# Patient Record
Sex: FEMALE | Race: WHITE | NOT HISPANIC OR LATINO | Employment: OTHER | URBAN - METROPOLITAN AREA
[De-identification: names, ages, dates, MRNs, and addresses within clinical notes are randomized per-mention and may not be internally consistent; named-entity substitution may affect disease eponyms.]

---

## 2017-01-03 ENCOUNTER — ALLSCRIPTS OFFICE VISIT (OUTPATIENT)
Dept: OTHER | Facility: OTHER | Age: 77
End: 2017-01-03

## 2017-01-09 ENCOUNTER — ALLSCRIPTS OFFICE VISIT (OUTPATIENT)
Dept: OTHER | Facility: OTHER | Age: 77
End: 2017-01-09

## 2017-01-09 DIAGNOSIS — M25.552 PAIN IN LEFT HIP: ICD-10-CM

## 2017-01-09 DIAGNOSIS — Z12.31 ENCOUNTER FOR SCREENING MAMMOGRAM FOR MALIGNANT NEOPLASM OF BREAST: ICD-10-CM

## 2017-01-09 DIAGNOSIS — Z00.00 ENCOUNTER FOR GENERAL ADULT MEDICAL EXAMINATION WITHOUT ABNORMAL FINDINGS: ICD-10-CM

## 2017-01-11 ENCOUNTER — GENERIC CONVERSION - ENCOUNTER (OUTPATIENT)
Dept: OTHER | Facility: OTHER | Age: 77
End: 2017-01-11

## 2017-01-26 ENCOUNTER — LAB REQUISITION (OUTPATIENT)
Dept: LAB | Facility: HOSPITAL | Age: 77
End: 2017-01-26
Payer: MEDICARE

## 2017-01-26 DIAGNOSIS — I48.92 ATRIAL FLUTTER (HCC): ICD-10-CM

## 2017-01-26 LAB
INR PPP: 2.19 (ref 0.86–1.16)
PROTHROMBIN TIME: 24.1 SECONDS (ref 12–14.3)

## 2017-01-26 PROCEDURE — 85610 PROTHROMBIN TIME: CPT | Performed by: INTERNAL MEDICINE

## 2017-02-22 ENCOUNTER — GENERIC CONVERSION - ENCOUNTER (OUTPATIENT)
Dept: OTHER | Facility: OTHER | Age: 77
End: 2017-02-22

## 2017-02-27 ENCOUNTER — LAB REQUISITION (OUTPATIENT)
Dept: LAB | Facility: HOSPITAL | Age: 77
End: 2017-02-27
Payer: MEDICARE

## 2017-02-27 DIAGNOSIS — E11.65 TYPE 2 DIABETES MELLITUS WITH HYPERGLYCEMIA (HCC): ICD-10-CM

## 2017-02-27 DIAGNOSIS — E03.9 HYPOTHYROIDISM: ICD-10-CM

## 2017-02-27 DIAGNOSIS — I48.92 ATRIAL FLUTTER (HCC): ICD-10-CM

## 2017-02-27 LAB
ANION GAP SERPL CALCULATED.3IONS-SCNC: 9 MMOL/L (ref 4–13)
BUN SERPL-MCNC: 16 MG/DL (ref 5–25)
CALCIUM SERPL-MCNC: 8.4 MG/DL (ref 8.3–10.1)
CHLORIDE SERPL-SCNC: 104 MMOL/L (ref 100–108)
CO2 SERPL-SCNC: 27 MMOL/L (ref 21–32)
CREAT SERPL-MCNC: 0.83 MG/DL (ref 0.6–1.3)
CREAT UR-MCNC: 27.7 MG/DL
EST. AVERAGE GLUCOSE BLD GHB EST-MCNC: 169 MG/DL
GFR SERPL CREATININE-BSD FRML MDRD: >60 ML/MIN/1.73SQ M
GLUCOSE SERPL-MCNC: 224 MG/DL (ref 65–140)
HBA1C MFR BLD: 7.5 % (ref 4.2–6.3)
INR PPP: 2.22 (ref 0.86–1.16)
MICROALBUMIN UR-MCNC: 11.6 MG/L (ref 0–20)
MICROALBUMIN/CREAT 24H UR: 42 MG/G CREATININE (ref 0–30)
POTASSIUM SERPL-SCNC: 3.8 MMOL/L (ref 3.5–5.3)
PROTHROMBIN TIME: 24.3 SECONDS (ref 12–14.3)
SODIUM SERPL-SCNC: 140 MMOL/L (ref 136–145)

## 2017-02-27 PROCEDURE — 83036 HEMOGLOBIN GLYCOSYLATED A1C: CPT | Performed by: INTERNAL MEDICINE

## 2017-02-27 PROCEDURE — 85610 PROTHROMBIN TIME: CPT | Performed by: INTERNAL MEDICINE

## 2017-02-27 PROCEDURE — 80048 BASIC METABOLIC PNL TOTAL CA: CPT | Performed by: INTERNAL MEDICINE

## 2017-02-27 PROCEDURE — 82043 UR ALBUMIN QUANTITATIVE: CPT | Performed by: INTERNAL MEDICINE

## 2017-02-27 PROCEDURE — 82570 ASSAY OF URINE CREATININE: CPT | Performed by: INTERNAL MEDICINE

## 2017-03-04 ENCOUNTER — GENERIC CONVERSION - ENCOUNTER (OUTPATIENT)
Dept: OTHER | Facility: OTHER | Age: 77
End: 2017-03-04

## 2017-03-07 ENCOUNTER — ALLSCRIPTS OFFICE VISIT (OUTPATIENT)
Dept: OTHER | Facility: OTHER | Age: 77
End: 2017-03-07

## 2017-03-17 ENCOUNTER — ALLSCRIPTS OFFICE VISIT (OUTPATIENT)
Dept: OTHER | Facility: OTHER | Age: 77
End: 2017-03-17

## 2017-03-17 DIAGNOSIS — E11.65 TYPE 2 DIABETES MELLITUS WITH HYPERGLYCEMIA (HCC): ICD-10-CM

## 2017-03-17 DIAGNOSIS — E03.9 HYPOTHYROIDISM: ICD-10-CM

## 2017-03-24 ENCOUNTER — GENERIC CONVERSION - ENCOUNTER (OUTPATIENT)
Dept: OTHER | Facility: OTHER | Age: 77
End: 2017-03-24

## 2017-03-29 ENCOUNTER — LAB REQUISITION (OUTPATIENT)
Dept: LAB | Facility: HOSPITAL | Age: 77
End: 2017-03-29
Payer: MEDICARE

## 2017-03-29 DIAGNOSIS — I48.92 ATRIAL FLUTTER (HCC): ICD-10-CM

## 2017-03-29 LAB
INR PPP: 1.72 (ref 0.86–1.16)
PROTHROMBIN TIME: 20.1 SECONDS (ref 12–14.3)

## 2017-03-29 PROCEDURE — 85610 PROTHROMBIN TIME: CPT | Performed by: INTERNAL MEDICINE

## 2017-03-30 ENCOUNTER — GENERIC CONVERSION - ENCOUNTER (OUTPATIENT)
Dept: OTHER | Facility: OTHER | Age: 77
End: 2017-03-30

## 2017-04-13 ENCOUNTER — LAB REQUISITION (OUTPATIENT)
Dept: LAB | Facility: HOSPITAL | Age: 77
End: 2017-04-13
Payer: MEDICARE

## 2017-04-13 DIAGNOSIS — I48.92 ATRIAL FLUTTER (HCC): ICD-10-CM

## 2017-04-13 LAB
INR PPP: 2.22 (ref 0.86–1.16)
PROTHROMBIN TIME: 24.3 SECONDS (ref 12–14.3)

## 2017-04-13 PROCEDURE — 85610 PROTHROMBIN TIME: CPT | Performed by: INTERNAL MEDICINE

## 2017-04-20 ENCOUNTER — ALLSCRIPTS OFFICE VISIT (OUTPATIENT)
Dept: OTHER | Facility: OTHER | Age: 77
End: 2017-04-20

## 2017-04-24 ENCOUNTER — LAB REQUISITION (OUTPATIENT)
Dept: LAB | Facility: HOSPITAL | Age: 77
End: 2017-04-24
Payer: MEDICARE

## 2017-04-24 DIAGNOSIS — I48.92 ATRIAL FLUTTER (HCC): ICD-10-CM

## 2017-04-24 LAB
INR PPP: 2.07 (ref 0.86–1.16)
PROTHROMBIN TIME: 23.1 SECONDS (ref 12–14.3)

## 2017-04-24 PROCEDURE — 85610 PROTHROMBIN TIME: CPT | Performed by: INTERNAL MEDICINE

## 2017-05-05 ENCOUNTER — ALLSCRIPTS OFFICE VISIT (OUTPATIENT)
Dept: OTHER | Facility: OTHER | Age: 77
End: 2017-05-05

## 2017-05-26 ENCOUNTER — LAB REQUISITION (OUTPATIENT)
Dept: LAB | Facility: HOSPITAL | Age: 77
End: 2017-05-26
Payer: MEDICARE

## 2017-05-26 DIAGNOSIS — I48.92 ATRIAL FLUTTER (HCC): ICD-10-CM

## 2017-05-26 LAB
INR PPP: 3.38 (ref 0.86–1.16)
PROTHROMBIN TIME: 34.7 SECONDS (ref 12.1–14.4)

## 2017-05-26 PROCEDURE — 85610 PROTHROMBIN TIME: CPT | Performed by: INTERNAL MEDICINE

## 2017-06-09 ENCOUNTER — LAB REQUISITION (OUTPATIENT)
Dept: LAB | Facility: HOSPITAL | Age: 77
End: 2017-06-09
Payer: MEDICARE

## 2017-06-09 DIAGNOSIS — I70.223 ATHEROSCLEROSIS OF NATIVE ARTERY OF BOTH LOWER EXTREMITIES WITH REST PAIN (HCC): ICD-10-CM

## 2017-06-09 DIAGNOSIS — I70.213 ATHEROSCLEROSIS OF NATIVE ARTERY OF BOTH LOWER EXTREMITIES WITH INTERMITTENT CLAUDICATION (HCC): ICD-10-CM

## 2017-06-09 LAB
BUN SERPL-MCNC: 20 MG/DL (ref 5–25)
CK SERPL-CCNC: 83 U/L (ref 26–192)
INR PPP: 2.34 (ref 0.86–1.16)
PLATELET # BLD AUTO: 268 THOUSANDS/UL (ref 149–390)
PMV BLD AUTO: 11.8 FL (ref 8.9–12.7)
PROTHROMBIN TIME: 25.9 SECONDS (ref 12.1–14.4)

## 2017-06-09 PROCEDURE — 85610 PROTHROMBIN TIME: CPT | Performed by: RADIOLOGY

## 2017-06-09 PROCEDURE — 84520 ASSAY OF UREA NITROGEN: CPT | Performed by: RADIOLOGY

## 2017-06-09 PROCEDURE — 82550 ASSAY OF CK (CPK): CPT | Performed by: RADIOLOGY

## 2017-06-09 PROCEDURE — 85049 AUTOMATED PLATELET COUNT: CPT | Performed by: RADIOLOGY

## 2017-06-27 ENCOUNTER — HOSPITAL ENCOUNTER (OUTPATIENT)
Dept: RADIOLOGY | Age: 77
Discharge: HOME | End: 2017-06-27
Payer: MEDICARE

## 2017-06-27 ENCOUNTER — LAB REQUISITION (OUTPATIENT)
Dept: LAB | Facility: HOSPITAL | Age: 77
End: 2017-06-27
Payer: MEDICARE

## 2017-06-27 ENCOUNTER — ALLSCRIPTS OFFICE VISIT (OUTPATIENT)
Dept: OTHER | Facility: OTHER | Age: 77
End: 2017-06-27

## 2017-06-27 DIAGNOSIS — I48.92 ATRIAL FLUTTER (HCC): ICD-10-CM

## 2017-06-27 DIAGNOSIS — S00.90XA: ICD-10-CM

## 2017-06-27 DIAGNOSIS — Z79.01 LONG TERM CURRENT USE OF ANTICOAGULANT: ICD-10-CM

## 2017-06-27 DIAGNOSIS — E11.65 TYPE 2 DIABETES MELLITUS WITH HYPERGLYCEMIA (HCC): ICD-10-CM

## 2017-06-27 LAB
INR PPP: 2.67 (ref 0.86–1.16)
PROTHROMBIN TIME: 28.8 SECONDS (ref 12.1–14.4)

## 2017-06-27 PROCEDURE — 70450 CT HEAD/BRAIN W/O DYE: CPT

## 2017-06-27 PROCEDURE — 85610 PROTHROMBIN TIME: CPT | Performed by: INTERNAL MEDICINE

## 2017-07-12 ENCOUNTER — GENERIC CONVERSION - ENCOUNTER (OUTPATIENT)
Dept: OTHER | Facility: OTHER | Age: 77
End: 2017-07-12

## 2017-07-14 ENCOUNTER — APPOINTMENT (OUTPATIENT)
Dept: LAB | Facility: HOSPITAL | Age: 77
End: 2017-07-14
Attending: INTERNAL MEDICINE
Payer: MEDICARE

## 2017-07-14 DIAGNOSIS — E11.65 TYPE 2 DIABETES MELLITUS WITH HYPERGLYCEMIA (HCC): ICD-10-CM

## 2017-07-14 DIAGNOSIS — E03.9 HYPOTHYROIDISM: ICD-10-CM

## 2017-07-14 LAB
CHOLEST SERPL-MCNC: 147 MG/DL (ref 50–200)
EST. AVERAGE GLUCOSE BLD GHB EST-MCNC: 189 MG/DL
GLUCOSE P FAST SERPL-MCNC: 140 MG/DL (ref 65–99)
HBA1C MFR BLD: 8.2 % (ref 4.2–6.3)
HDLC SERPL-MCNC: 42 MG/DL (ref 40–60)
LDLC SERPL CALC-MCNC: 63 MG/DL (ref 0–100)
TRIGL SERPL-MCNC: 210 MG/DL
TSH SERPL DL<=0.05 MIU/L-ACNC: 0.36 UIU/ML (ref 0.36–3.74)

## 2017-07-14 PROCEDURE — 83036 HEMOGLOBIN GLYCOSYLATED A1C: CPT

## 2017-07-14 PROCEDURE — 36415 COLL VENOUS BLD VENIPUNCTURE: CPT

## 2017-07-14 PROCEDURE — 80061 LIPID PANEL: CPT

## 2017-07-14 PROCEDURE — 84443 ASSAY THYROID STIM HORMONE: CPT

## 2017-07-14 PROCEDURE — 82947 ASSAY GLUCOSE BLOOD QUANT: CPT

## 2017-07-18 ENCOUNTER — ALLSCRIPTS OFFICE VISIT (OUTPATIENT)
Dept: OTHER | Facility: OTHER | Age: 77
End: 2017-07-18

## 2017-07-28 ENCOUNTER — LAB REQUISITION (OUTPATIENT)
Dept: LAB | Facility: HOSPITAL | Age: 77
End: 2017-07-28
Payer: MEDICARE

## 2017-07-28 ENCOUNTER — ALLSCRIPTS OFFICE VISIT (OUTPATIENT)
Dept: OTHER | Facility: OTHER | Age: 77
End: 2017-07-28

## 2017-07-28 DIAGNOSIS — I48.92 ATRIAL FLUTTER (HCC): ICD-10-CM

## 2017-07-28 LAB
INR PPP: 2.15 (ref 0.86–1.16)
PROTHROMBIN TIME: 24.2 SECONDS (ref 12.1–14.4)

## 2017-07-28 PROCEDURE — 85610 PROTHROMBIN TIME: CPT | Performed by: INTERNAL MEDICINE

## 2017-08-28 ENCOUNTER — ALLSCRIPTS OFFICE VISIT (OUTPATIENT)
Dept: OTHER | Facility: OTHER | Age: 77
End: 2017-08-28

## 2017-08-28 DIAGNOSIS — I70.213 ATHEROSCLEROSIS OF NATIVE ARTERY OF BOTH LOWER EXTREMITIES WITH INTERMITTENT CLAUDICATION (HCC): ICD-10-CM

## 2017-08-29 ENCOUNTER — GENERIC CONVERSION - ENCOUNTER (OUTPATIENT)
Dept: OTHER | Facility: OTHER | Age: 77
End: 2017-08-29

## 2017-08-30 ENCOUNTER — LAB REQUISITION (OUTPATIENT)
Dept: LAB | Facility: HOSPITAL | Age: 77
End: 2017-08-30
Payer: MEDICARE

## 2017-08-30 DIAGNOSIS — I48.92 ATRIAL FLUTTER (HCC): ICD-10-CM

## 2017-08-30 LAB
INR PPP: 3.15 (ref 0.86–1.16)
PROTHROMBIN TIME: 32.8 SECONDS (ref 12.1–14.4)

## 2017-08-30 PROCEDURE — 85610 PROTHROMBIN TIME: CPT | Performed by: INTERNAL MEDICINE

## 2017-08-31 ENCOUNTER — GENERIC CONVERSION - ENCOUNTER (OUTPATIENT)
Dept: OTHER | Facility: OTHER | Age: 77
End: 2017-08-31

## 2017-09-05 ENCOUNTER — GENERIC CONVERSION - ENCOUNTER (OUTPATIENT)
Dept: OTHER | Facility: OTHER | Age: 77
End: 2017-09-05

## 2017-09-07 ENCOUNTER — LAB REQUISITION (OUTPATIENT)
Dept: LAB | Facility: HOSPITAL | Age: 77
End: 2017-09-07
Payer: MEDICARE

## 2017-09-07 DIAGNOSIS — I48.92 ATRIAL FLUTTER (HCC): ICD-10-CM

## 2017-09-07 LAB
INR PPP: 3.49 (ref 0.86–1.16)
PROTHROMBIN TIME: 35.6 SECONDS (ref 12.1–14.4)

## 2017-09-07 PROCEDURE — 85610 PROTHROMBIN TIME: CPT | Performed by: INTERNAL MEDICINE

## 2017-09-22 ENCOUNTER — HOSPITAL ENCOUNTER (OUTPATIENT)
Dept: NON INVASIVE DIAGNOSTICS | Facility: CLINIC | Age: 77
Discharge: HOME | End: 2017-09-22
Payer: MEDICARE

## 2017-09-22 DIAGNOSIS — I70.213 ATHEROSCLEROSIS OF NATIVE ARTERY OF BOTH LOWER EXTREMITIES WITH INTERMITTENT CLAUDICATION (HCC): ICD-10-CM

## 2017-09-22 PROCEDURE — 93880 EXTRACRANIAL BILAT STUDY: CPT

## 2017-09-22 PROCEDURE — 93925 LOWER EXTREMITY STUDY: CPT

## 2017-09-22 PROCEDURE — 93923 UPR/LXTR ART STDY 3+ LVLS: CPT

## 2017-09-28 ENCOUNTER — LAB REQUISITION (OUTPATIENT)
Dept: LAB | Facility: HOSPITAL | Age: 77
End: 2017-09-28
Payer: MEDICARE

## 2017-09-28 DIAGNOSIS — I48.92 ATRIAL FLUTTER (HCC): ICD-10-CM

## 2017-09-28 LAB
INR PPP: 2.1 (ref 0.86–1.16)
PROTHROMBIN TIME: 23.8 SECONDS (ref 12.1–14.4)

## 2017-09-28 PROCEDURE — 85610 PROTHROMBIN TIME: CPT | Performed by: INTERNAL MEDICINE

## 2017-10-18 ENCOUNTER — GENERIC CONVERSION - ENCOUNTER (OUTPATIENT)
Dept: OTHER | Facility: OTHER | Age: 77
End: 2017-10-18

## 2017-10-18 ENCOUNTER — APPOINTMENT (OUTPATIENT)
Dept: LAB | Facility: HOSPITAL | Age: 77
End: 2017-10-18
Attending: INTERNAL MEDICINE
Payer: MEDICARE

## 2017-10-18 DIAGNOSIS — E11.65 TYPE 2 DIABETES MELLITUS WITH HYPERGLYCEMIA (HCC): ICD-10-CM

## 2017-10-18 LAB
ANION GAP SERPL CALCULATED.3IONS-SCNC: 7 MMOL/L (ref 4–13)
BUN SERPL-MCNC: 16 MG/DL (ref 5–25)
CALCIUM SERPL-MCNC: 8.4 MG/DL (ref 8.3–10.1)
CHLORIDE SERPL-SCNC: 103 MMOL/L (ref 100–108)
CO2 SERPL-SCNC: 30 MMOL/L (ref 21–32)
CREAT SERPL-MCNC: 0.76 MG/DL (ref 0.6–1.3)
EST. AVERAGE GLUCOSE BLD GHB EST-MCNC: 180 MG/DL
GFR SERPL CREATININE-BSD FRML MDRD: 75 ML/MIN/1.73SQ M
GLUCOSE SERPL-MCNC: 132 MG/DL (ref 65–140)
HBA1C MFR BLD: 7.9 % (ref 4.2–6.3)
POTASSIUM SERPL-SCNC: 5.4 MMOL/L (ref 3.5–5.3)
SODIUM SERPL-SCNC: 140 MMOL/L (ref 136–145)

## 2017-10-18 PROCEDURE — 80048 BASIC METABOLIC PNL TOTAL CA: CPT

## 2017-10-18 PROCEDURE — 83036 HEMOGLOBIN GLYCOSYLATED A1C: CPT

## 2017-10-18 PROCEDURE — 36415 COLL VENOUS BLD VENIPUNCTURE: CPT

## 2017-10-19 ENCOUNTER — GENERIC CONVERSION - ENCOUNTER (OUTPATIENT)
Dept: OTHER | Facility: OTHER | Age: 77
End: 2017-10-19

## 2017-10-23 ENCOUNTER — ALLSCRIPTS OFFICE VISIT (OUTPATIENT)
Dept: OTHER | Facility: OTHER | Age: 77
End: 2017-10-23

## 2017-10-23 ENCOUNTER — APPOINTMENT (OUTPATIENT)
Dept: LAB | Facility: HOSPITAL | Age: 77
End: 2017-10-23
Attending: INTERNAL MEDICINE
Payer: MEDICARE

## 2017-10-23 DIAGNOSIS — E83.42 HYPOMAGNESEMIA: ICD-10-CM

## 2017-10-23 DIAGNOSIS — E87.5 HYPERKALEMIA: ICD-10-CM

## 2017-10-23 DIAGNOSIS — I48.92 ATRIAL FLUTTER (HCC): ICD-10-CM

## 2017-10-23 DIAGNOSIS — I44.7 LEFT BUNDLE-BRANCH BLOCK: ICD-10-CM

## 2017-10-23 DIAGNOSIS — E11.65 TYPE 2 DIABETES MELLITUS WITH HYPERGLYCEMIA (HCC): ICD-10-CM

## 2017-10-23 DIAGNOSIS — I70.299 OTHER ATHEROSCLEROSIS OF NATIVE ARTERIES OF EXTREMITIES, UNSPECIFIED EXTREMITY (HCC): ICD-10-CM

## 2017-10-23 DIAGNOSIS — E03.9 HYPOTHYROIDISM: ICD-10-CM

## 2017-10-23 LAB
ANION GAP SERPL CALCULATED.3IONS-SCNC: 11 MMOL/L (ref 4–13)
BUN SERPL-MCNC: 21 MG/DL (ref 5–25)
CALCIUM SERPL-MCNC: 7.3 MG/DL (ref 8.3–10.1)
CHLORIDE SERPL-SCNC: 102 MMOL/L (ref 100–108)
CO2 SERPL-SCNC: 26 MMOL/L (ref 21–32)
CREAT SERPL-MCNC: 0.93 MG/DL (ref 0.6–1.3)
GFR SERPL CREATININE-BSD FRML MDRD: 59 ML/MIN/1.73SQ M
GLUCOSE P FAST SERPL-MCNC: 284 MG/DL (ref 65–99)
MAGNESIUM SERPL-MCNC: 1 MG/DL (ref 1.6–2.6)
POTASSIUM SERPL-SCNC: 4.2 MMOL/L (ref 3.5–5.3)
SODIUM SERPL-SCNC: 139 MMOL/L (ref 136–145)

## 2017-10-23 PROCEDURE — 80048 BASIC METABOLIC PNL TOTAL CA: CPT

## 2017-10-23 PROCEDURE — 36415 COLL VENOUS BLD VENIPUNCTURE: CPT

## 2017-10-23 PROCEDURE — 83735 ASSAY OF MAGNESIUM: CPT

## 2017-10-26 ENCOUNTER — GENERIC CONVERSION - ENCOUNTER (OUTPATIENT)
Dept: OTHER | Facility: OTHER | Age: 77
End: 2017-10-26

## 2017-10-26 NOTE — PROGRESS NOTES
Assessment  1  Peripheral arterial disease (443 9) (I73 9)   2  Ulcer of toe of left foot (707 15) (L99 467)    Plan  Peripheral arterial disease, Ulcer of toe of left foot    · Follow-up visit in 3 weeks Evaluation and Treatment  Follow-up to reevaluate healing of  the left hallux  Status: Complete  Done: 60AJQ9881   Ordered; For: Peripheral arterial disease, Ulcer of toe of left foot; Ordered By: Saira Jo Performed:  Due: 45WZH4467; Last Updated By: Gabriele Gale; 10/23/2017 3:22:32 PM    Discussion/Summary  Discussion Summary:   76year old female with HTN,HLD, CHF, CAD, HTN, aflutter on Coumadin, aortic stenosis, DM , OA returns to the office to review LEAD and evaluate left hallux  Rubrous changes of toes bilaterally and L hallux 2mm ulceration  9/22/17 showed diffuse arterial disease bilaterally with no focal stenosis, SHADI non compressible bilaterally and MTP/GTP 51/37 on the left  ulceration of the left hallux was photographed  Recommended Betadine paint daily  case with Dr Abbie Rodriguez and will give more time for healing of left hallux since no focal stenosis on arterial duplex and significant cardiac disease she would be a high risk for arteriogram    Counseling Documentation With Imm: The patient was counseled regarding diagnostic results,-- instructions for management,-- risk factor reductions,-- prognosis,-- patient and family education,-- impressions,-- risks and benefits of treatment options,-- importance of compliance with treatment  Chief Complaint  Chief Complaint Free Text Note Form: I am here to go over my test results  had a CV and LISBETH on 9/22/17  She denies any TIA or stroke symptoms  She does not have any one sided weakness or facial drooping  She continues to have claudication pain when she walks about 1 block  She has to rest for a few minutes for the pain to go away  She denies any rest pain  She is on Warfarin and Aspirin 81 mg daily        History of Present Illness  HPI: 76 year old female with HTN,HLD, CHF, CAD, HTN, aflutter on Coumadin, aortic stenosis, DM , OA returns to the office to review LEAD 9/22/17  Rubrous changes of toes bilaterally and L hallux 2mm ulceration  She complains of bilateral calf claudication symptoms at --1 block of walking  She denies ischemic rest pain  She follows Dr Mitch Ghosh for foot care  She is asymptomatic from a carotid standpoint  She denies any focal neurological changes  duplex showed diffuse disease without focal stenosis, SHADI was unobtainable due to non compressible and below healing level toe pressures  Carotid duplex showed <50% stenosis bilaterally  Review of Systems  Complete Female - Vasc:   Constitutional: no loss in appetite-- and-- feeling tired, but-- no fever,-- no recent weight gain,-- no chills-- and-- no recent weight loss  Eyes: No sudden vision loss, no blurred vision, no double vision  ENT: no loss of hearing, no nosebleeds, no hoarseness  Cardiovascular: intermittent leg claudication,-- no painful veins-- and-- no bleeding veins, but-- regular heart rate,-- no chest pain,-- no palpitations-- and-- leg pain with walking  Respiratory: no shortness of breath,-- no wheezing,-- no cough,-- no orthopnea-- and-- no complaints of PND, but-- shortness of breath during exertion  Gastrointestinal: No nausea, No vomiting, no diarrhea, no blood in stool  Genitourinary: no dysuria, no Hematuria,no urinary incontinence  Musculoskeletal: no lumbar pain,-- joint swelling,-- myalgias-- and-- joint stiffness, but-- no limb pain-- and-- no limb swelling  Integumentary: no rash, no lesions, no wounds, no ulcer  Neurological: headache,-- numbness,-- no dementia,-- limb weakness-- and-- difficulty walking, but-- no confusion,-- no dizziness,-- no convulsions-- and-- no fainting  Psychiatric: no depression, no mood disorders, no anxiety  Hematologic/Lymphatic: no bleeding disorder, no easy bruising  ROS Reviewed:   ROS reviewed  Active Problems   1  Acute combined systolic and diastolic congestive heart failure (428 41,428 0) (I50 41)   2  Arthritis of hand (716 94) (M19 049)   3  At high risk for injury related to fall (V49 89) (Z91 89)   4  Atherosclerosis of both lower extremities with intermittent claudication (440 21) (I70 213)   5  Atherosclerosis of native artery of left lower extremity with rest pain (440 22) (I70 222)   6  Atherosclerotic heart disease of native coronary artery without angina pectoris (414 01)   (I25 10)   7  Atrial flutter (427 32) (I48 92)   8  Benign essential HTN (401 1) (I10)   9  Current use of long term anticoagulation (V58 61) (Z79 01)   10  Degenerative joint disease involving multiple joints (715 89) (M15 9)   11  Diabetes mellitus type 2, uncontrolled (250 02) (E11 65)   12  Diabetic retinopathy associated with diabetes mellitus of other type (250 50,362 01)    (E13 319)   13  Gastroesophageal reflux disease (530 81) (K21 9)   14  Hypercholesterolemia (272 0) (E78 00)   15  Hyperkalemia (276 7) (E87 5)   16  Hypocalcemia (275 41) (E83 51)   17  Hypokalemia (276 8) (E87 6)   18  Hypothyroidism (244 9) (E03 9)   19  LBBB (left bundle branch block) (426 3) (I44 7)   20  Migraine headache (346 90) (G43 909)   21  Minor head trauma (959 01) (S00 90XA)   22  Nephrolithiasis (592 0) (N20 0)   23  Nonintractable episodic headache, unspecified headache type (784 0) (R51)   24  Nonrheumatic aortic valve stenosis (424 1) (I35 0)   25  Osteoarthritis of both knees, unspecified osteoarthritis type (715 96) (M17 0)   26  Pain of left sacroiliac joint (724 6) (M53 3)   27  Precordial pain (786 51) (R07 2)   28  PVD (peripheral vascular disease) (443 9) (I73 9)   29  ST elevation myocardial infarction (STEMI) (410 90) (I21 3)   30  Subsequent non-ST elevation (NSTEMI) myocardial infarction (410 70) (I22 2)    Hypomagnesemia (275 2) (E83 42)       Abscess of groin (682 2) (W17 432)          Past Medical History  1  History of Acute bronchitis (466 0) (J20 9)   2  History of Acute shoulder pain, right (719 41) (M25 511)   3  History of Acute upper respiratory infection (465 9) (J06 9)   4  History of Asthma (493 90) (J45 909)   5  History of Asthma with acute exacerbation (493 92) (J45 901)   6  History of Burn of shoulder, left, first degree, initial encounter   7  Denied: History of Carrier Of STD   8  History of Chest pain on breathing (786 52) (R07 1)   9  History of Chronic low back pain (724 2,338 29) (M54 5,G89 29)   10  History of Concussion With Loss Of Consciousness Of Unspecified Duration (850 5)   11  History of Coronary Artery Disease (V12 59)   12  History of Depression screen (V79 0) (Z13 89)   13  History of Diabetes Mellitus (250 00)   14  History of Diabetes mellitus type 2, uncontrolled (250 02) (E11 65)   15  History of Diabetes mellitus type 2, uncontrolled (250 02) (E11 65)   16  History of Diabetic Peripheral Neuropathy (357 2)   17  History of Dyspnea on exertion (786 09) (R06 09)   18  History of Encounter for screening mammogram for malignant neoplasm of breast    (V76 12) (Z12 31)   19  Encounter for special screening examination for genitourinary disorder (V81 6) (Z13 89)   20  History of Fall from other slipping, tripping, or stumbling (E885 9) (W01  0XXA)   21  History of Flank pain (789 09) (R10 9)   22  History of Foreign Body In The Right Ear (931)   23  History of Functional murmur (R01 0)   24  History of Gross hematuria (599 71) (R31 0)   25  History of Hand pain, right (729 5) (M79 641)   26  History of Hand pain, unspecified laterality   27  History of abdominal pain (V13 89) (Z87 898)   28  History of acute bronchitis (V12 69) (Z87 09)   29  History of allergy (V15 09) (Z88 9)   30  History of asthma (V12 69) (Z87 09)   31  History of candidiasis (V12 09) (Z86 19)   32  History of cataract (V12 49) (Z86 69)   33  History of chronic fatigue syndrome (V13 89) (Z87 898)   34   History of herpes zoster (V12 09) (Z86 19)   35  History of hyperkalemia (V12 29) (Z86 39)   36  History of hyperlipidemia (V12 29) (Z86 39)   37  History of hypertension (V12 59) (Z86 79)   38  History of Lyme disease (V12 09) (Z86 19)   39  History of nausea and vomiting (V12 79) (Z87 898)   40  History of pseudogout (V13 59) (Z87 39)   41  History of renal calculi (V13 01) (Z87 442)   42  History of spinal stenosis (V13 59) (Z87 39)   43  History of transient cerebral ischemia (V12 54) (Z86 73)   44  History of viral gastroenteritis (V12 09) (Z86 19)   45  History of Irritable bowel syndrome (564 1) (K58 9)   46  History of Joint Pain In Both Knees (719 46)   47  History of Left hip pain (719 45) (M25 552)   48  History of Limb pain (729 5) (M79 609)   49  History of Lumbar radiculopathy (724 4) (M54 16)   50  History of Lyme disease (088 81) (A69 20)   51  Denied: History of Mental Status Change   52  History of Muscle spasm (728 85) (M62 838)   53  History of Neck pain (723 1) (M54 2)   54  History of Neck sprain, initial encounter (847 0) (S13 9XXA)   55  History of Non-neoplastic nevus (448 1) (I78 1)   56  History of Non-toxic multinodular goiter (241 1) (E04 2)   57  History of Osteoarthritis (715 90) (M19 90)   58  History of Other chronic pain (338 29) (G89 29)   59  History of Other nonspecific abnormal finding of lung field (793 19) (R91 8)   60  History of Pain and swelling of left lower leg (729 5,729 81) (M79 662,M79 89)   61  History of Palpitations (785 1) (R00 2)   62  History of Palpitations (785 1) (R00 2)   63  History of PPD screening test (V74 1) (Z11 1)   64  History of Reported Prior Kidney Disease (V13 00)   65  History of Soft Tissue Pain In Toes (729 5)   66  History of Strain of knee, left, initial encounter (844 9) (U33 252A)   67  History of Stroke Syndrome (436)   68  History of Toe fracture, left (826 0) (S92 912A)   69  History of Toe pain (729 5) (M91 836)   70   History of Trigger ring finger (727 03) (M65 349)   71  History of Type 2 diabetes mellitus (250 00) (E11 9)   72  History of Type 2 diabetes mellitus with hyperglycemia (250 00) (E11 65)   73  History of Urinary Symptoms (788 69)   74  History of UTI (urinary tract infection) (599 0) (N39 0)   75  History of UTI (urinary tract infection) (599 0) (N39 0)   76  History of UTI (urinary tract infection) (599 0) (N39 0)  Active Problems And Past Medical History Reviewed: The active problems and past medical history were reviewed and updated today  Surgical History  1  History of Appendectomy   2  History of Cholecystectomy   3  History of Complete Colonoscopy   4  History of Coronary Artery Four Or More Arterial Bypass Grafts   5  History of Diagnostic Esophagogastroduodenoscopy   6  History of Gallbladder Surgery   7  History of Gallbladder Surgery   8  History of Hysterectomy   9  History of Laminectomy Lumbar   10  History of Thyroid Surgery Total Thyroidectomy  Surgical History Reviewed: The surgical history was reviewed and updated today  Family History  Mother    1  Family history of cancer (V16 9) (Z80 9)   2  Family history of Stroke Syndrome (V17 1)  Father    3  Family history of cancer (V16 9) (Z80 9)   4  Family history of heart disease (V17 49) (Z82 49)  Daughter    5  Family history of type 2 diabetes mellitus (V18 0) (Z83 3)  Sister    6  Family history of Breast Cancer (V16 3)  Family History    7  Family history of Family Health Status 5  Children Living  Family History Reviewed: The family history was reviewed and updated today  Social History   · Denied: History of Alcohol Use (History)   · Daily Coffee Consumption (1  Cups/Day)   · Daily Cola Consumption (1  Cans/Day)   · Daily Tea Consumption (1  Cups/Day)   · Denied: History of Drug Use   · Marital History -    · Never A Smoker   · Occupation: Retired  Social History Reviewed: The social history was reviewed and updated today  Current Meds   1  Asmanex 30 Metered Doses 220 MCG/INH Inhalation Aerosol Powder Breath Activated;   INHALE 1 PUFFS Daily; Therapy: 68YPT0485 to (Last Rx:20Oct2016)  Requested for: 21Oct2016 Ordered   2  Aspirin 81 MG Oral Tablet Delayed Release; TAKE 1 TABLET DAILY; Therapy: (Recorded:54Weu4960) to Recorded   3  Atorvastatin Calcium 80 MG Oral Tablet; Take 1 tablet daily  Requested for: 49EPD3426;   Last Rx:17Mar2017 Ordered   4  Yesenia Contour Monitor w/Device Kit; USE AS DIRECTED; Therapy: 43Wkl6994 to (Last Nir Miranda)  Requested for: 80Tim6750 Ordered   5  Yesenia Contour Test In [employer]; TEST 4 TIMES DAILY; Therapy: 69XFL6422 to (61 23 68)  Requested for: 60YZD8136; Last   Rx:53Jmq9246 Ordered   6  BD Insulin Syringe Ultrafine 31G X 5/16 1 ML Miscellaneous; USE AS DIRECTED    Requested for: 01BTK5658; Last Rx:24Oct2016 Ordered   7  Esomeprazole Magnesium 20 MG Oral Capsule Delayed Release; TAKE 1 CAPSULE   ONCE DAILY; Therapy: 51EYW0007 to (Evaluate:16Oct2017)  Requested for: 74EDR7192; Last   Rx:61Rif7508 Ordered   8  Furosemide 40 MG Oral Tablet; Take 1 tablet daily  Requested for: 72SCQ9530; Last   Rx:17Mar2017 Ordered   9  Lancets Ultra Fine Miscellaneous; test glucose QID or as directed; Therapy: 44IYB7671 to (Last Rx:17Mar2017)  Requested for: 69CKT4084 Ordered   10  Lantus 100 UNIT/ML Subcutaneous Solution; Inject 25 in am and 30 q pm  units    subcutaneously; Therapy: 84BXS5958 to  Requested for: 47DJA1643 Recorded   11  Levothyroxine Sodium 100 MCG Oral Tablet; Take 1 tablet daily  Requested for:    13NWS7870; Last Rx:18Oct2017 Ordered   12  Lisinopril 5 MG Oral Tablet; Take 1 tablet daily  Requested for: 92BEE3936; Last    Rx:17Mar2017 Ordered   13  Magnesium Oxide 400 MG Oral Capsule; Take 1 capsule twice daily; Therapy: 63Tfq7250 to (Last Rx:02Jun2017)  Requested for: 02Jun2017 Ordered   14  MetFORMIN HCl - 1000 MG Oral Tablet; than one bid;     Therapy: 85YNQ6626 to (Evaluate:95Ekq1929)  Requested for: 28YGN4486; Last    Rx:17Mar2017 Ordered   15  Metoprolol Succinate  MG Oral Tablet Extended Release 24 Hour; TAKE 1 TABLET    Bedtime  Requested for: 21SQQ1004; Last Rx:17Mar2017 Ordered   16  Tums CHEW; PRN; Therapy: (Recorded:41Xlt0104) to Recorded   17  Tylenol 325 MG Oral Tablet; TAKE 1 TO 2 TABLETS EVERY 4 HOURS AS NEEDED; Therapy: (Recorded:94Dnq0824) to Recorded   18  Ventolin  (90 Base) MCG/ACT Inhalation Aerosol Solution; INHALE 1-2 PUFFS    EVERY 4-6 HOURS AS NEEDED AND AS DIRECTED  Requested for: 21Mar2016; Last    Rx:21Mar2016 Ordered   19  Vision Vitamins Oral Tablet; Take 1 tablet daily; Therapy: 10Wkq2386 to Recorded   20  Vitamin D3 1000 UNIT Oral Tablet Chewable; CHEW 1 TABLET DAILY; Therapy: 87WCZ9739 to (Last Josiane Ruddle)  Requested for: 02Sqy0459; Status: ACTIVE    - Renewal Denied Ordered   21  Warfarin Sodium 3 MG Oral Tablet; Take 2 tablets on Tuesday and 1 tablet all other days; Therapy: 93VHH7135 to (Last Rx:26Fhz1352)  Requested for: 75PRC5634 Ordered  Medication List Reviewed: The medication list was reviewed and updated today  Allergies  1  Cephalosporins   2  Doxycycline Hyclate CAPS   3  Doxycycline Monohydrate CAPS   4  Levaquin TABS   5  Toradol SOLN  6  IVP Dye    Vitals  Vital Signs    Recorded: 68RNU7283 02:21PM   Heart Rate 66, L Radial   Pulse Quality Normal, L Radial   Respiration Quality Normal   Respiration 16   Systolic 868, LUE, Sitting   Diastolic 86, LUE, Sitting   Height 5 ft 5 in   Weight 175 lb    BMI Calculated 29 12   BSA Calculated 1 87     Physical Exam    Femoral: right 2+-- and-- left 2+  Posterior tibialis: right 0-- and-- left 0  Dorsalis pedis: right 0-- and-- left 0  Distal Pulse Exam:     Extremities: No upper or lower extremity edema  LE Varicose Veins: No Varicose Veins are Present  The heart rate was normal  The rhythm was regular   Heart sounds: normal S1-- and-- normal S2  Murmurs: harsh blowing systolic murmur  Pulmonary   Respiratory effort: No increased work of breathing or signs of respiratory distress  Auscultation of lungs: Clear to auscultation  No wheezing, no rales, no rhonchi  Psychiatric   Orientation to person, place and time: Normal    Mood and affect: Normal    Ears, Nose, Mouth, and Throat   Hearing: Normal    Neurologic Sensory exam normal   Motor skills intact  Musculoskeletal   Gait and station: Normal    Skin rubrous discoloration of toes bilaterally that pallors with elevation  pin point ulceration of left hallux  see photo  Results/Data    VAS CAROTID COMPLETE STUDY 51Xtc7122 11:17AM Sukumargisselle Betancur    Order Number: [id number]    - Patient Instructions: To schedule this appointment, please contact Central Scheduling at [phone]  Test Name Result Flag Reference   VAS CAROTID COMPLETE STUDY (Report)     [facility] REPORT   CLINICAL:   Indications: Bilateral Carotid disease w/o CVA [I65 29]  Patient presents for a general health evaluation secondary to other   cardiovascular symptoms  Patient is asymptomatic from a cerebral vascular   standpoint  Risk Factors   The patient has history of HTN, Diabetes (Yes) and hyperlipidemia  Clinical   Right Brachial Pressure: 135/ mmHg, Left Brachial Pressure: 130/ mmHg  FINDINGS:      Right    Impression PSV EDV (cm/s) Direction of Flow Ratio    Dist  ICA         56     16           0 76    Mid  ICA         116     23           1 58    Prox  ICA  1 - 49%   65     19           0 88    Dist CCA         62     13                 Mid CCA          74     15           1 12    Prox CCA         66      9                 Ext Carotid       205      0           2 78    Prox Vert         60     12 Antegrade            Subclavian        140      4                    Left     Impression PSV EDV (cm/s) Direction of Flow Ratio    Dist  ICA         60     13           1 02    Mid   ICA         74 20           1 26    Prox  ICA  1 - 49%   79     24           1 35    Dist CCA         48     10                 Mid CCA          59     13           0 95    Prox CCA         62      9                 Ext Carotid        75      0           1 28    Prox Vert         41     12 Antegrade            Subclavian        136      0                          CONCLUSION:      Impression   RIGHT:   There is <50% stenosis noted in the internal carotid artery  Plaque is   homogenous and smooth  Vertebral artery flow is antegrade  There is no significant subclavian artery   disease  LEFT:   There is <50% stenosis noted in the internal carotid artery  Plaque is   heterogenous and irregular  Vertebral artery flow is antegrade  There is no significant subclavian artery   disease  Internal carotid artery stenosis determination by consensus criteria from:   Palak Arauz et al  Carotid Artery Stenosis: Gray-Scale and Doppler US Diagnosis   - Society of Radiologists in [address] [place] [address] [address], Radiology 2003;   [phone]  SIGNATURE:   Electronically Signed by: Amos Santizo on 2017-09-22 04:36:29 PM     VAS LOWER LIMB ARTERIAL DUPLEX, COMPLETE BILATERAL/GRAFTS [id number] 11:17AM Jordan Isaac    Order Number: [id number]    - Patient Instructions: To schedule this appointment, please contact Central Scheduling at [phone]  Test Name Result Flag Reference   VAS LOWER LIMB ARTERIAL DUPLEX, COMPLETE BILATERAL/GRAFTS (Report)     [facility] REPORT   CLINICAL:   Indications: Atherosclerosis of native arteries of extremities with   intermittent claudication, bilateral legs [I70 213]  Pt  states that she gets discoloration in her right shin when walking  Denies   claudication  Risk Factors: The patient has history of Diabetes (Yes), Hyperlipidemia and   Hypertension  Right Brachial Pressure: 135/ mmHg, Left Brachial Pressure: 130/ mmHg           FINDINGS:      Segment Rig    Left                        PSV EDV PSV EDV    Common Femoral Artery  46  0  92  2    Prox Profunda           65  0    Prox SFA        58  4  69  0    Mid SFA         80  0  87  0    Dist SFA        118  0  83  0    Proximal Pop      40  0  71  0    Distal Pop       32  4  31  3    Dist Post Tibial    34  7  78  20             CONCLUSION:   Impression:   RIGHT LOWER LIMB:   Diffuse disease throughout the femoral and popliteal arteries without evidence   of a focal stenosis  Ankle/Brachial index: Non-Compressible   PVR/ PPG tracings are dampened  Metatarsal pressure of 65mmHg   Great toe pressure of 33mmHg, below the healing range   LEFT LOWER LIMB:   Diffuse disease throughout the femoral and popliteal arteries without evidence   of a focal stenosis  Ankle/Brachial index: Non-Compressible   PVR/ PPG tracings are dampened  Metatarsal pressure of 51mmHg   Great toe pressure of 34mmHg, within the healing range      Recommend repeat testing in 1year as per protocol unless otherwise indicated  SIGNATURE:   Electronically Signed by: Marleni Francis on 2017-09-22 04:36:43 PM     Future Appointments    Date/Time Provider Specialty Site   11/16/2017 02:15 PM Doctor, Yesenia Tompkins MD Vascular Surgery [address] [place] [address]   12/21/2017 11:00 AM Bhargavi Sotelo, [address] [place] [address] Urology [facility] FOR UROLOGY [facility] Vonnie   11/22/2017 11:15 AM Sid Pablo MD Internal Medicine [facility] Kate Silvestre   10/26/2017 01:00 PM Ger Lu MD Internal Medicine [facility]     Signatures   Electronically signed by : Criselda Self; Oct 23 2017  4:17PM EST                       (Author)    Electronically signed by :  Fran Espinoza MD; Oct 25 2017  9:25PM EST                       (Author)

## 2017-10-30 ENCOUNTER — LAB REQUISITION (OUTPATIENT)
Dept: LAB | Facility: HOSPITAL | Age: 77
End: 2017-10-30
Payer: MEDICARE

## 2017-10-30 DIAGNOSIS — I48.92 ATRIAL FLUTTER (HCC): ICD-10-CM

## 2017-10-30 LAB
INR PPP: 2.59 (ref 0.86–1.16)
PROTHROMBIN TIME: 28.1 SECONDS (ref 12.1–14.4)

## 2017-10-30 PROCEDURE — 85610 PROTHROMBIN TIME: CPT | Performed by: INTERNAL MEDICINE

## 2017-11-16 ENCOUNTER — ALLSCRIPTS OFFICE VISIT (OUTPATIENT)
Dept: OTHER | Facility: OTHER | Age: 77
End: 2017-11-16

## 2017-11-22 ENCOUNTER — GENERIC CONVERSION - ENCOUNTER (OUTPATIENT)
Dept: INTERNAL MEDICINE CLINIC | Age: 77
End: 2017-11-22

## 2017-11-22 ENCOUNTER — ALLSCRIPTS OFFICE VISIT (OUTPATIENT)
Dept: OTHER | Facility: OTHER | Age: 77
End: 2017-11-22

## 2017-11-29 ENCOUNTER — LAB REQUISITION (OUTPATIENT)
Dept: LAB | Facility: HOSPITAL | Age: 77
End: 2017-11-29
Payer: MEDICARE

## 2017-11-29 DIAGNOSIS — I48.92 ATRIAL FLUTTER (HCC): ICD-10-CM

## 2017-11-29 LAB
INR PPP: 2.66 (ref 0.86–1.16)
PROTHROMBIN TIME: 28.7 SECONDS (ref 12.1–14.4)

## 2017-11-29 PROCEDURE — 85610 PROTHROMBIN TIME: CPT | Performed by: INTERNAL MEDICINE

## 2017-12-14 ENCOUNTER — TELEPHONE (OUTPATIENT)
Dept: RADIOLOGY | Facility: HOSPITAL | Age: 77
End: 2017-12-14

## 2017-12-14 ENCOUNTER — APPOINTMENT (OUTPATIENT)
Dept: LAB | Facility: HOSPITAL | Age: 77
End: 2017-12-14
Attending: SURGERY
Payer: MEDICARE

## 2017-12-14 ENCOUNTER — GENERIC CONVERSION - ENCOUNTER (OUTPATIENT)
Dept: OTHER | Facility: OTHER | Age: 77
End: 2017-12-14

## 2017-12-14 DIAGNOSIS — I70.299 OTHER ATHEROSCLEROSIS OF NATIVE ARTERIES OF EXTREMITIES, UNSPECIFIED EXTREMITY (HCC): ICD-10-CM

## 2017-12-14 LAB
ANION GAP SERPL CALCULATED.3IONS-SCNC: 11 MMOL/L (ref 4–13)
BUN SERPL-MCNC: 25 MG/DL (ref 5–25)
CALCIUM SERPL-MCNC: 8 MG/DL (ref 8.3–10.1)
CHLORIDE SERPL-SCNC: 102 MMOL/L (ref 100–108)
CO2 SERPL-SCNC: 27 MMOL/L (ref 21–32)
CREAT SERPL-MCNC: 0.87 MG/DL (ref 0.6–1.3)
ERYTHROCYTE [DISTWIDTH] IN BLOOD BY AUTOMATED COUNT: 17.3 % (ref 11.6–15.1)
GFR SERPL CREATININE-BSD FRML MDRD: 64 ML/MIN/1.73SQ M
GLUCOSE SERPL-MCNC: 137 MG/DL (ref 65–140)
HCT VFR BLD AUTO: 37.6 % (ref 34.8–46.1)
HGB BLD-MCNC: 11.6 G/DL (ref 11.5–15.4)
INR PPP: 2.97 (ref 0.86–1.16)
MCH RBC QN AUTO: 22.8 PG (ref 26.8–34.3)
MCHC RBC AUTO-ENTMCNC: 30.9 G/DL (ref 31.4–37.4)
MCV RBC AUTO: 74 FL (ref 82–98)
PLATELET # BLD AUTO: 315 THOUSANDS/UL (ref 149–390)
PMV BLD AUTO: 11.4 FL (ref 8.9–12.7)
POTASSIUM SERPL-SCNC: 4.2 MMOL/L (ref 3.5–5.3)
PROTHROMBIN TIME: 31.3 SECONDS (ref 12.1–14.4)
RBC # BLD AUTO: 5.09 MILLION/UL (ref 3.81–5.12)
SODIUM SERPL-SCNC: 140 MMOL/L (ref 136–145)
WBC # BLD AUTO: 10.91 THOUSAND/UL (ref 4.31–10.16)

## 2017-12-14 PROCEDURE — 85610 PROTHROMBIN TIME: CPT

## 2017-12-14 PROCEDURE — 85027 COMPLETE CBC AUTOMATED: CPT

## 2017-12-14 PROCEDURE — 36415 COLL VENOUS BLD VENIPUNCTURE: CPT

## 2017-12-14 PROCEDURE — 80048 BASIC METABOLIC PNL TOTAL CA: CPT

## 2017-12-15 ENCOUNTER — TELEPHONE (OUTPATIENT)
Dept: RADIOLOGY | Facility: HOSPITAL | Age: 77
End: 2017-12-15

## 2017-12-18 ENCOUNTER — TELEPHONE (OUTPATIENT)
Dept: RADIOLOGY | Facility: HOSPITAL | Age: 77
End: 2017-12-18

## 2017-12-18 ENCOUNTER — TELEPHONE (OUTPATIENT)
Dept: INPATIENT UNIT | Facility: HOSPITAL | Age: 77
End: 2017-12-18

## 2017-12-18 RX ORDER — SODIUM CHLORIDE 9 MG/ML
75 INJECTION, SOLUTION INTRAVENOUS CONTINUOUS
Status: CANCELLED | OUTPATIENT
Start: 2017-12-19

## 2017-12-18 NOTE — PROGRESS NOTES
Spoke with patient via phone  Reviewed pre procedure instructions including medications for contrast reaction  Planned leet femoral agram with right groin puncture

## 2017-12-19 ENCOUNTER — HOSPITAL ENCOUNTER (INPATIENT)
Dept: RADIOLOGY | Facility: HOSPITAL | Age: 77
LOS: 2 days | Discharge: HOME | DRG: 280 | End: 2017-12-21
Attending: SURGERY | Admitting: INTERNAL MEDICINE
Payer: MEDICARE

## 2017-12-19 ENCOUNTER — GENERIC CONVERSION - ENCOUNTER (OUTPATIENT)
Dept: INTERNAL MEDICINE CLINIC | Facility: CLINIC | Age: 77
End: 2017-12-19

## 2017-12-19 ENCOUNTER — GENERIC CONVERSION - ENCOUNTER (OUTPATIENT)
Dept: OTHER | Facility: OTHER | Age: 77
End: 2017-12-19

## 2017-12-19 ENCOUNTER — APPOINTMENT (OUTPATIENT)
Dept: RADIOLOGY | Facility: HOSPITAL | Age: 77
DRG: 280 | End: 2017-12-19
Attending: INTERNAL MEDICINE
Payer: MEDICARE

## 2017-12-19 DIAGNOSIS — J81.0 ACUTE PULMONARY EDEMA (HCC): Primary | ICD-10-CM

## 2017-12-19 DIAGNOSIS — I70.0 AORTO-ILIAC ATHEROSCLEROSIS (HCC): ICD-10-CM

## 2017-12-19 DIAGNOSIS — I70.8 AORTO-ILIAC ATHEROSCLEROSIS (HCC): ICD-10-CM

## 2017-12-19 DIAGNOSIS — I48.92 ATRIAL FLUTTER (HCC): ICD-10-CM

## 2017-12-19 DIAGNOSIS — L97.909: ICD-10-CM

## 2017-12-19 PROBLEM — I73.9 PAD (PERIPHERAL ARTERY DISEASE) (HCC): Status: ACTIVE | Noted: 2017-12-19

## 2017-12-19 PROBLEM — E78.5 HYPERLIPIDEMIA: Status: ACTIVE | Noted: 2017-12-19

## 2017-12-19 PROBLEM — N17.9 AKI (ACUTE KIDNEY INJURY) (HCC): Status: ACTIVE | Noted: 2017-12-19

## 2017-12-19 PROBLEM — IMO0002 DM (DIABETES MELLITUS) TYPE II UNCONTROLLED, PERIPH VASCULAR DISORDER: Status: ACTIVE | Noted: 2017-12-19

## 2017-12-19 PROBLEM — I21.4 NSTEMI (NON-ST ELEVATED MYOCARDIAL INFARCTION) (HCC): Status: ACTIVE | Noted: 2017-12-19

## 2017-12-19 PROBLEM — K21.9 GERD (GASTROESOPHAGEAL REFLUX DISEASE): Status: ACTIVE | Noted: 2017-12-19

## 2017-12-19 PROBLEM — I25.10 CAD (CORONARY ARTERY DISEASE): Status: ACTIVE | Noted: 2017-12-19

## 2017-12-19 PROBLEM — I10 HTN (HYPERTENSION): Status: ACTIVE | Noted: 2017-12-19

## 2017-12-19 PROBLEM — I35.0 SEVERE AORTIC STENOSIS: Status: ACTIVE | Noted: 2017-12-19

## 2017-12-19 PROBLEM — I05.0 MODERATE MITRAL STENOSIS: Status: ACTIVE | Noted: 2017-12-19

## 2017-12-19 LAB
ACETONE SERPL-MCNC: NEGATIVE MG/DL
ANION GAP SERPL CALCULATED.3IONS-SCNC: 15 MMOL/L (ref 4–13)
ATRIAL RATE: 125 BPM
ATRIAL RATE: 147 BPM
BACTERIA UR QL AUTO: NORMAL /HPF
BASE EXCESS BLDA CALC-SCNC: -5 MMOL/L (ref -2–3)
BASOPHILS # BLD AUTO: 0.01 THOUSANDS/ΜL (ref 0–0.1)
BASOPHILS NFR BLD AUTO: 0 % (ref 0–1)
BILIRUB UR QL STRIP: NEGATIVE
BUN SERPL-MCNC: 25 MG/DL (ref 5–25)
CA-I BLD-SCNC: 0.92 MMOL/L (ref 1.12–1.32)
CALCIUM SERPL-MCNC: 7.6 MG/DL (ref 8.3–10.1)
CHLORIDE SERPL-SCNC: 101 MMOL/L (ref 100–108)
CLARITY UR: CLEAR
CO2 SERPL-SCNC: 19 MMOL/L (ref 21–32)
COLOR UR: YELLOW
CREAT SERPL-MCNC: 1.44 MG/DL (ref 0.6–1.3)
EOSINOPHIL # BLD AUTO: 0.01 THOUSAND/ΜL (ref 0–0.61)
EOSINOPHIL NFR BLD AUTO: 0 % (ref 0–6)
ERYTHROCYTE [DISTWIDTH] IN BLOOD BY AUTOMATED COUNT: 17.2 % (ref 11.6–15.1)
EST. AVERAGE GLUCOSE BLD GHB EST-MCNC: 174 MG/DL
GFR SERPL CREATININE-BSD FRML MDRD: 35 ML/MIN/1.73SQ M
GLUCOSE SERPL-MCNC: 315 MG/DL (ref 65–140)
GLUCOSE SERPL-MCNC: 412 MG/DL (ref 65–140)
GLUCOSE SERPL-MCNC: 456 MG/DL (ref 65–140)
GLUCOSE SERPL-MCNC: 483 MG/DL (ref 65–140)
GLUCOSE UR STRIP-MCNC: ABNORMAL MG/DL
HBA1C MFR BLD: 7.7 % (ref 4.2–6.3)
HCO3 BLDA-SCNC: 22.7 MMOL/L (ref 24–30)
HCT VFR BLD AUTO: 41 % (ref 34.8–46.1)
HCT VFR BLD CALC: 44 % (ref 34.8–46.1)
HGB BLD-MCNC: 12.9 G/DL (ref 11.5–15.4)
HGB BLDA-MCNC: 15 G/DL (ref 11.5–15.4)
HGB UR QL STRIP.AUTO: ABNORMAL
HYALINE CASTS #/AREA URNS LPF: NORMAL /LPF
INR PPP: 1.43 (ref 0.86–1.16)
KETONES UR STRIP-MCNC: NEGATIVE MG/DL
LEUKOCYTE ESTERASE UR QL STRIP: ABNORMAL
LYMPHOCYTES # BLD AUTO: 3.38 THOUSANDS/ΜL (ref 0.6–4.47)
LYMPHOCYTES NFR BLD AUTO: 24 % (ref 14–44)
MAGNESIUM SERPL-MCNC: 1.1 MG/DL (ref 1.6–2.6)
MCH RBC QN AUTO: 23 PG (ref 26.8–34.3)
MCHC RBC AUTO-ENTMCNC: 31.5 G/DL (ref 31.4–37.4)
MCV RBC AUTO: 73 FL (ref 82–98)
MONOCYTES # BLD AUTO: 0.36 THOUSAND/ΜL (ref 0.17–1.22)
MONOCYTES NFR BLD AUTO: 3 % (ref 4–12)
NEUTROPHILS # BLD AUTO: 10.17 THOUSANDS/ΜL (ref 1.85–7.62)
NEUTS SEG NFR BLD AUTO: 73 % (ref 43–75)
NITRITE UR QL STRIP: NEGATIVE
NON-SQ EPI CELLS URNS QL MICRO: NORMAL /HPF
NRBC BLD AUTO-RTO: 0 /100 WBCS
PCO2 BLD: 24 MMOL/L (ref 21–32)
PCO2 BLD: 55.1 MM HG (ref 42–50)
PH BLD: 7.22 [PH] (ref 7.3–7.4)
PH UR STRIP.AUTO: 5.5 [PH] (ref 4.5–8)
PHOSPHATE SERPL-MCNC: 5.2 MG/DL (ref 2.3–4.1)
PLATELET # BLD AUTO: 440 THOUSANDS/UL (ref 149–390)
PMV BLD AUTO: 10.8 FL (ref 8.9–12.7)
PO2 BLD: 45 MM HG (ref 35–45)
POTASSIUM BLD-SCNC: 8.7 MMOL/L (ref 3.5–5.3)
POTASSIUM SERPL-SCNC: 4 MMOL/L (ref 3.5–5.3)
PR INTERVAL: 48 MS
PROT UR STRIP-MCNC: NEGATIVE MG/DL
PROTHROMBIN TIME: 17.5 SECONDS (ref 12.1–14.4)
QRS AXIS: -42 DEGREES
QRS AXIS: 237 DEGREES
QRSD INTERVAL: 144 MS
QRSD INTERVAL: 150 MS
QT INTERVAL: 386 MS
QT INTERVAL: 398 MS
QTC INTERVAL: 576 MS
QTC INTERVAL: 604 MS
RBC # BLD AUTO: 5.61 MILLION/UL (ref 3.81–5.12)
RBC #/AREA URNS AUTO: NORMAL /HPF
SAO2 % BLD FROM PO2: 71 % (ref 95–98)
SODIUM BLD-SCNC: 134 MMOL/L (ref 136–145)
SODIUM SERPL-SCNC: 135 MMOL/L (ref 136–145)
SP GR UR STRIP.AUTO: 1.02 (ref 1–1.03)
SPECIMEN SOURCE: ABNORMAL
T WAVE AXIS: 115 DEGREES
T WAVE AXIS: 70 DEGREES
TROPONIN I SERPL-MCNC: 0.02 NG/ML
TROPONIN I SERPL-MCNC: 0.43 NG/ML
TROPONIN I SERPL-MCNC: 1.34 NG/ML
TSH SERPL DL<=0.05 MIU/L-ACNC: 0.76 UIU/ML (ref 0.36–3.74)
UROBILINOGEN UR QL STRIP.AUTO: 0.2 E.U./DL
VENTRICULAR RATE: 126 BPM
VENTRICULAR RATE: 147 BPM
WBC # BLD AUTO: 13.99 THOUSAND/UL (ref 4.31–10.16)
WBC #/AREA URNS AUTO: NORMAL /HPF

## 2017-12-19 PROCEDURE — 75625 CONTRAST EXAM ABDOMINL AORTA: CPT

## 2017-12-19 PROCEDURE — 5A09357 ASSISTANCE WITH RESPIRATORY VENTILATION, LESS THAN 24 CONSECUTIVE HOURS, CONTINUOUS POSITIVE AIRWAY PRESSURE: ICD-10-PCS | Performed by: INTERNAL MEDICINE

## 2017-12-19 PROCEDURE — 85014 HEMATOCRIT: CPT

## 2017-12-19 PROCEDURE — 93005 ELECTROCARDIOGRAM TRACING: CPT

## 2017-12-19 PROCEDURE — 83036 HEMOGLOBIN GLYCOSYLATED A1C: CPT | Performed by: INTERNAL MEDICINE

## 2017-12-19 PROCEDURE — 94660 CPAP INITIATION&MGMT: CPT

## 2017-12-19 PROCEDURE — 75710 ARTERY X-RAYS ARM/LEG: CPT

## 2017-12-19 PROCEDURE — B41D1ZZ FLUOROSCOPY OF AORTA AND BILATERAL LOWER EXTREMITY ARTERIES USING LOW OSMOLAR CONTRAST: ICD-10-PCS | Performed by: SURGERY

## 2017-12-19 PROCEDURE — 82948 REAGENT STRIP/BLOOD GLUCOSE: CPT

## 2017-12-19 PROCEDURE — 84443 ASSAY THYROID STIM HORMONE: CPT | Performed by: INTERNAL MEDICINE

## 2017-12-19 PROCEDURE — 83735 ASSAY OF MAGNESIUM: CPT | Performed by: INTERNAL MEDICINE

## 2017-12-19 PROCEDURE — C1769 GUIDE WIRE: HCPCS

## 2017-12-19 PROCEDURE — 82330 ASSAY OF CALCIUM: CPT

## 2017-12-19 PROCEDURE — 84484 ASSAY OF TROPONIN QUANT: CPT | Performed by: SURGERY

## 2017-12-19 PROCEDURE — 76937 US GUIDE VASCULAR ACCESS: CPT

## 2017-12-19 PROCEDURE — 82803 BLOOD GASES ANY COMBINATION: CPT

## 2017-12-19 PROCEDURE — 84132 ASSAY OF SERUM POTASSIUM: CPT

## 2017-12-19 PROCEDURE — C1894 INTRO/SHEATH, NON-LASER: HCPCS

## 2017-12-19 PROCEDURE — 99153 MOD SED SAME PHYS/QHP EA: CPT

## 2017-12-19 PROCEDURE — 84295 ASSAY OF SERUM SODIUM: CPT

## 2017-12-19 PROCEDURE — 81001 URINALYSIS AUTO W/SCOPE: CPT | Performed by: INTERNAL MEDICINE

## 2017-12-19 PROCEDURE — 84484 ASSAY OF TROPONIN QUANT: CPT | Performed by: NURSE PRACTITIONER

## 2017-12-19 PROCEDURE — 80048 BASIC METABOLIC PNL TOTAL CA: CPT | Performed by: SURGERY

## 2017-12-19 PROCEDURE — 82947 ASSAY GLUCOSE BLOOD QUANT: CPT

## 2017-12-19 PROCEDURE — 85610 PROTHROMBIN TIME: CPT | Performed by: SURGERY

## 2017-12-19 PROCEDURE — 85025 COMPLETE CBC W/AUTO DIFF WBC: CPT | Performed by: SURGERY

## 2017-12-19 PROCEDURE — 84100 ASSAY OF PHOSPHORUS: CPT | Performed by: INTERNAL MEDICINE

## 2017-12-19 PROCEDURE — 71010 HB CHEST X-RAY 1 VIEW FRONTAL (PORTABLE): CPT

## 2017-12-19 PROCEDURE — 94760 N-INVAS EAR/PLS OXIMETRY 1: CPT

## 2017-12-19 PROCEDURE — 82009 KETONE BODYS QUAL: CPT | Performed by: INTERNAL MEDICINE

## 2017-12-19 PROCEDURE — 99152 MOD SED SAME PHYS/QHP 5/>YRS: CPT

## 2017-12-19 RX ORDER — LISINOPRIL 5 MG/1
2.5 TABLET ORAL
COMMUNITY
End: 2018-05-30 | Stop reason: SDUPTHER

## 2017-12-19 RX ORDER — FUROSEMIDE 10 MG/ML
40 INJECTION INTRAMUSCULAR; INTRAVENOUS ONCE
Status: COMPLETED | OUTPATIENT
Start: 2017-12-19 | End: 2017-12-19

## 2017-12-19 RX ORDER — LABETALOL HYDROCHLORIDE 5 MG/ML
10 INJECTION, SOLUTION INTRAVENOUS EVERY 6 HOURS PRN
Status: DISCONTINUED | OUTPATIENT
Start: 2017-12-19 | End: 2017-12-21 | Stop reason: HOSPADM

## 2017-12-19 RX ORDER — LEVOTHYROXINE SODIUM 0.1 MG/1
100 TABLET ORAL DAILY
COMMUNITY
End: 2018-06-04 | Stop reason: SDUPTHER

## 2017-12-19 RX ORDER — FLUTICASONE PROPIONATE 220 UG/1
2 AEROSOL, METERED RESPIRATORY (INHALATION)
Status: DISCONTINUED | OUTPATIENT
Start: 2017-12-19 | End: 2017-12-21 | Stop reason: HOSPADM

## 2017-12-19 RX ORDER — MELATONIN
1000 2 TIMES DAILY
COMMUNITY
Start: 2016-10-20 | End: 2018-08-24 | Stop reason: HOSPADM

## 2017-12-19 RX ORDER — WARFARIN SODIUM 1 MG/1
3 TABLET ORAL
Status: DISCONTINUED | OUTPATIENT
Start: 2017-12-20 | End: 2017-12-21 | Stop reason: HOSPADM

## 2017-12-19 RX ORDER — ATORVASTATIN CALCIUM 80 MG/1
80 TABLET, FILM COATED ORAL DAILY
COMMUNITY
End: 2018-04-26 | Stop reason: SDUPTHER

## 2017-12-19 RX ORDER — SODIUM CHLORIDE 9 MG/ML
125 INJECTION, SOLUTION INTRAVENOUS CONTINUOUS
Status: DISCONTINUED | OUTPATIENT
Start: 2017-12-19 | End: 2017-12-19

## 2017-12-19 RX ORDER — ALBUTEROL SULFATE 90 UG/1
2 AEROSOL, METERED RESPIRATORY (INHALATION) EVERY 4 HOURS PRN
Status: DISCONTINUED | OUTPATIENT
Start: 2017-12-19 | End: 2017-12-21 | Stop reason: HOSPADM

## 2017-12-19 RX ORDER — ONDANSETRON 2 MG/ML
4 INJECTION INTRAMUSCULAR; INTRAVENOUS EVERY 6 HOURS PRN
Status: DISCONTINUED | OUTPATIENT
Start: 2017-12-19 | End: 2017-12-21 | Stop reason: HOSPADM

## 2017-12-19 RX ORDER — NITROGLYCERIN 0.4 MG/1
0.4 TABLET SUBLINGUAL
COMMUNITY
Start: 2015-05-11 | End: 2018-09-07

## 2017-12-19 RX ORDER — WARFARIN SODIUM 3 MG/1
3 TABLET ORAL DAILY
COMMUNITY
Start: 2016-06-27 | End: 2018-04-19 | Stop reason: HOSPADM

## 2017-12-19 RX ORDER — NITROGLYCERIN 0.4 MG/1
0.4 TABLET SUBLINGUAL
Status: DISCONTINUED | OUTPATIENT
Start: 2017-12-19 | End: 2017-12-19

## 2017-12-19 RX ORDER — LABETALOL HYDROCHLORIDE 5 MG/ML
INJECTION, SOLUTION INTRAVENOUS CODE/TRAUMA/SEDATION MEDICATION
Status: COMPLETED | OUTPATIENT
Start: 2017-12-19 | End: 2017-12-19

## 2017-12-19 RX ORDER — CALCIUM CARBONATE/VITAMIN D3 500-10/5ML
400 LIQUID (ML) ORAL 2 TIMES DAILY
COMMUNITY
Start: 2016-08-31 | End: 2018-04-06 | Stop reason: HOSPADM

## 2017-12-19 RX ORDER — PANTOPRAZOLE SODIUM 20 MG/1
20 TABLET, DELAYED RELEASE ORAL
Status: DISCONTINUED | OUTPATIENT
Start: 2017-12-20 | End: 2017-12-21 | Stop reason: HOSPADM

## 2017-12-19 RX ORDER — LEVOTHYROXINE SODIUM 0.1 MG/1
100 TABLET ORAL
Status: DISCONTINUED | OUTPATIENT
Start: 2017-12-20 | End: 2017-12-21 | Stop reason: HOSPADM

## 2017-12-19 RX ORDER — MIDAZOLAM HYDROCHLORIDE 1 MG/ML
INJECTION INTRAMUSCULAR; INTRAVENOUS CODE/TRAUMA/SEDATION MEDICATION
Status: COMPLETED | OUTPATIENT
Start: 2017-12-19 | End: 2017-12-19

## 2017-12-19 RX ORDER — METOPROLOL SUCCINATE 50 MG/1
1 TABLET, EXTENDED RELEASE ORAL
COMMUNITY
Start: 2016-09-01 | End: 2018-04-06 | Stop reason: HOSPADM

## 2017-12-19 RX ORDER — SODIUM CHLORIDE 9 MG/ML
75 INJECTION, SOLUTION INTRAVENOUS CONTINUOUS
Status: DISCONTINUED | OUTPATIENT
Start: 2017-12-19 | End: 2017-12-19

## 2017-12-19 RX ORDER — LISINOPRIL 5 MG/1
5 TABLET ORAL
Status: DISCONTINUED | OUTPATIENT
Start: 2017-12-19 | End: 2017-12-21 | Stop reason: HOSPADM

## 2017-12-19 RX ORDER — ATORVASTATIN CALCIUM 80 MG/1
80 TABLET, FILM COATED ORAL DAILY
Status: DISCONTINUED | OUTPATIENT
Start: 2017-12-19 | End: 2017-12-21 | Stop reason: HOSPADM

## 2017-12-19 RX ORDER — NITROGLYCERIN 20 MG/100ML
5-200 INJECTION INTRAVENOUS
Status: DISCONTINUED | OUTPATIENT
Start: 2017-12-19 | End: 2017-12-19

## 2017-12-19 RX ORDER — METOPROLOL SUCCINATE 100 MG/1
100 TABLET, EXTENDED RELEASE ORAL
Status: DISCONTINUED | OUTPATIENT
Start: 2017-12-19 | End: 2017-12-21 | Stop reason: HOSPADM

## 2017-12-19 RX ORDER — OXYCODONE HYDROCHLORIDE AND ACETAMINOPHEN 5; 325 MG/1; MG/1
1 TABLET ORAL EVERY 4 HOURS PRN
Status: DISCONTINUED | OUTPATIENT
Start: 2017-12-19 | End: 2017-12-21 | Stop reason: HOSPADM

## 2017-12-19 RX ORDER — FUROSEMIDE 40 MG/1
40 TABLET ORAL DAILY
Status: DISCONTINUED | OUTPATIENT
Start: 2017-12-20 | End: 2017-12-20

## 2017-12-19 RX ORDER — ASPIRIN 81 MG/1
81 TABLET, CHEWABLE ORAL DAILY
Status: DISCONTINUED | OUTPATIENT
Start: 2017-12-19 | End: 2017-12-21 | Stop reason: HOSPADM

## 2017-12-19 RX ORDER — NITROGLYCERIN 0.4 MG/1
0.4 TABLET SUBLINGUAL ONCE
Status: COMPLETED | OUTPATIENT
Start: 2017-12-19 | End: 2017-12-19

## 2017-12-19 RX ORDER — NITROGLYCERIN 0.4 MG/1
0.4 TABLET SUBLINGUAL
Status: DISCONTINUED | OUTPATIENT
Start: 2017-12-19 | End: 2017-12-21 | Stop reason: HOSPADM

## 2017-12-19 RX ORDER — MELATONIN
1000 DAILY
Status: DISCONTINUED | OUTPATIENT
Start: 2017-12-20 | End: 2017-12-21 | Stop reason: HOSPADM

## 2017-12-19 RX ORDER — FUROSEMIDE 40 MG/1
40 TABLET ORAL DAILY
COMMUNITY
End: 2018-03-20 | Stop reason: HOSPADM

## 2017-12-19 RX ORDER — ALBUTEROL SULFATE 90 UG/1
2 AEROSOL, METERED RESPIRATORY (INHALATION) 2 TIMES DAILY PRN
Status: DISCONTINUED | OUTPATIENT
Start: 2017-12-19 | End: 2017-12-19

## 2017-12-19 RX ORDER — METOPROLOL SUCCINATE 100 MG/1
100 TABLET, EXTENDED RELEASE ORAL
COMMUNITY
End: 2018-04-06 | Stop reason: HOSPADM

## 2017-12-19 RX ORDER — NITROGLYCERIN 0.4 MG/1
TABLET SUBLINGUAL
Status: COMPLETED
Start: 2017-12-19 | End: 2017-12-19

## 2017-12-19 RX ORDER — METOPROLOL SUCCINATE 50 MG/1
50 TABLET, EXTENDED RELEASE ORAL
Status: DISCONTINUED | OUTPATIENT
Start: 2017-12-20 | End: 2017-12-21 | Stop reason: HOSPADM

## 2017-12-19 RX ORDER — FENTANYL CITRATE 50 UG/ML
INJECTION, SOLUTION INTRAMUSCULAR; INTRAVENOUS CODE/TRAUMA/SEDATION MEDICATION
Status: COMPLETED | OUTPATIENT
Start: 2017-12-19 | End: 2017-12-19

## 2017-12-19 RX ORDER — ALBUTEROL SULFATE 90 UG/1
108 AEROSOL, METERED RESPIRATORY (INHALATION) EVERY 4 HOURS PRN
COMMUNITY

## 2017-12-19 RX ADMIN — ASPIRIN 81 MG 81 MG: 81 TABLET ORAL at 18:25

## 2017-12-19 RX ADMIN — LISINOPRIL 5 MG: 5 TABLET ORAL at 21:10

## 2017-12-19 RX ADMIN — FLUTICASONE PROPIONATE 2 PUFF: 220 AEROSOL, METERED RESPIRATORY (INHALATION) at 21:11

## 2017-12-19 RX ADMIN — NITROGLYCERIN 0.4 MG: 0.4 TABLET SUBLINGUAL at 14:06

## 2017-12-19 RX ADMIN — SODIUM CHLORIDE 75 ML/HR: 0.9 INJECTION, SOLUTION INTRAVENOUS at 07:26

## 2017-12-19 RX ADMIN — INSULIN LISPRO 6 UNITS: 100 INJECTION, SOLUTION INTRAVENOUS; SUBCUTANEOUS at 18:37

## 2017-12-19 RX ADMIN — IODIXANOL 28 ML: 320 INJECTION, SOLUTION INTRAVASCULAR at 09:21

## 2017-12-19 RX ADMIN — WARFARIN SODIUM 6 MG: 5 TABLET ORAL at 18:28

## 2017-12-19 RX ADMIN — MIDAZOLAM 0.5 MG: 1 INJECTION INTRAMUSCULAR; INTRAVENOUS at 08:07

## 2017-12-19 RX ADMIN — FENTANYL CITRATE 50 MCG: 50 INJECTION, SOLUTION INTRAMUSCULAR; INTRAVENOUS at 08:23

## 2017-12-19 RX ADMIN — INSULIN LISPRO 3 UNITS: 100 INJECTION, SOLUTION INTRAVENOUS; SUBCUTANEOUS at 21:15

## 2017-12-19 RX ADMIN — MIDAZOLAM 1 MG: 1 INJECTION INTRAMUSCULAR; INTRAVENOUS at 08:23

## 2017-12-19 RX ADMIN — Medication 400 MG: at 18:24

## 2017-12-19 RX ADMIN — FENTANYL CITRATE 25 MCG: 50 INJECTION, SOLUTION INTRAMUSCULAR; INTRAVENOUS at 08:08

## 2017-12-19 RX ADMIN — ATORVASTATIN CALCIUM 80 MG: 80 TABLET, FILM COATED ORAL at 18:24

## 2017-12-19 RX ADMIN — INSULIN LISPRO 15 UNITS: 100 INJECTION, SOLUTION INTRAVENOUS; SUBCUTANEOUS at 18:37

## 2017-12-19 RX ADMIN — METOPROLOL SUCCINATE 100 MG: 100 TABLET, FILM COATED, EXTENDED RELEASE ORAL at 21:10

## 2017-12-19 RX ADMIN — NITROGLYCERIN 200 MCG/MIN: 20 INJECTION INTRAVENOUS at 14:17

## 2017-12-19 RX ADMIN — LABETALOL HYDROCHLORIDE 10 MG: 5 INJECTION, SOLUTION INTRAVENOUS at 08:13

## 2017-12-19 RX ADMIN — FUROSEMIDE 40 MG: 10 INJECTION, SOLUTION INTRAMUSCULAR; INTRAVENOUS at 16:35

## 2017-12-19 RX ADMIN — MIDAZOLAM 0.5 MG: 1 INJECTION INTRAMUSCULAR; INTRAVENOUS at 08:40

## 2017-12-19 RX ADMIN — FENTANYL CITRATE 25 MCG: 50 INJECTION, SOLUTION INTRAMUSCULAR; INTRAVENOUS at 08:40

## 2017-12-19 RX ADMIN — FUROSEMIDE 40 MG: 10 INJECTION, SOLUTION INTRAMUSCULAR; INTRAVENOUS at 14:17

## 2017-12-19 NOTE — RAPID RESPONSE
Progress Note - Rapid Response   Gabrielle Davis 76 y o  female MRN: [id number]    Patient presenting with acute pulmonary edema  Given 1 SL Nitro tab while awaiting Nitro gtt  Started on NRB while awaiting BiPAP  Nitro gtt started at 200, BiPAP placed with rapid improvement in O2, WOB  40 Lasix given  CXR, EKG, trop, CBC, BMP ordered  I personally discussed this with SOD - plan for admission to [facility]  Patient requesting FULL CODE

## 2017-12-19 NOTE — PROGRESS NOTES
Responded to RRT  resp distress 2" acute pulm edema post A-gram electively done as outpt  Known CAD with T2DM, HTN and PAFib  Preserved EF 57% as of 2015  Responded to IV lasix, nitro gtt  With improved BP and clinically, now no distress  BIPAP temporarily  CXR -   EKG - LBBB not new  Check CBC, BMP, INR, LA, troponin  Will need ischemaia work up and inpatient admission to Level 2 stepdown    Pt belongs to Dr Lali Manzo - informed  Total critical are time was 35 min

## 2017-12-19 NOTE — PROGRESS NOTES
Called IR about the patient complaining of chest pain  , O2- 83 on room air  Nasal cannula placed, EKG done  Lung sounds coarse  Spoke with Carmella Márquez, RN  He suggested to take the patient to the ED, explained patient not stable enough for the transfer, needs to be evaluated by a doctor  He stated that he was trying to get in contact with the doctors who were in a procedure  Paged Dr Eleni Schmidt office  Left a message with the Unit cat  Rapid response called  Will continue to monitor

## 2017-12-19 NOTE — PROGRESS NOTES
Vascular Surgery  Progress Note    CAD  Aflutter- Coumadin  Sys/ [facility] CHF  HTN  HLD  B/L CAROLINE  DM  AS  GERD  DJD  Hypothyroidism    S/P Diagnostic Agram- Dr Deepak Lewis Doctor  (PAD for L GT wound)      Called by IR and TVC office staff-- [facility] nursing reporting pt w/ resp distress, desat  Prior to my arrival, Rapid response called w/ staff at bedside  Per nursing report, pt has been stable since procedure  Maintained on NS @ 75ml/ hr up until ~ 1hour ago  C/o Chest pain and difficulty breathing-  "like an elephant sitting on my chest"- had not taken 2 inhalers this am   Had not taken routine Lasix this am   + hypertensive 180s  O2 sat 86%    Per Dr Deepak Lewis, agram was diagnostic/ no intervention  Had missed evening BBlocker along w/ other meds because she had fallen asleep  Required Labetalol during procedure for HTN  At my arrival, pt was being evaluated by Rapid response team   Tridil gtt & BiPap being initiated  Pt sitting upright in bed, intermittently removing non-rebreather  VS as noted      D/W Dr Deepak Lewis Doctor  Dx: Acute pulm edema likely from volu overload in setting of IVF hydration and missed diuretic  --plan admit to SOD Medicine ([facility]- David Montalvo)  --Cardiology consult  --CXR (p)  --event documentation as per Rapid Response team        Julian Jackson PA-C  12/19/2017

## 2017-12-19 NOTE — PROCEDURES
Procedures    12/19/17    PreopDx: atherosclerosis with L great toe wound    Postop Dx: severe tibial disease    Procedure:  R CFA access w/ 5F sheath and manual closure  AOG with LLE runoff    Indications: Pt is a 75 yo F w/ PAD and DM who presented with nonhealing L great toe wound  Noncompressible SHADI with MTP/GTP not predictive for healing in a diabetic  Presents for intervention      Anesthesia: Conscious sedation with 1% lidocaine    Physician: Alex Pleitez MD

## 2017-12-19 NOTE — PLAN OF CARE
Problem: CARDIOVASCULAR - ADULT  Goal: Maintains optimal cardiac output and hemodynamic stability  INTERVENTIONS:  - Monitor I/O, vital signs and rhythm  - Monitor for S/S and trends of decreased cardiac output i e  bleeding, hypotension  - Administer and titrate ordered vasoactive medications to optimize hemodynamic stability  - Assess quality of pulses, skin color and temperature  - Assess for signs of decreased coronary artery perfusion - ex   Angina  - Instruct patient to report change in severity of symptoms  Outcome: Not Progressing    Goal: Absence of cardiac dysrhythmias or at baseline rhythm  INTERVENTIONS:  - Continuous cardiac monitoring, monitor vital signs, obtain 12 lead EKG if indicated  - Administer antiarrhythmic and heart rate control medications as ordered  - Monitor electrolytes and administer replacement therapy as ordered  Outcome: Not Progressing      Problem: METABOLIC, FLUID AND ELECTROLYTES - ADULT  Goal: Electrolytes maintained within normal limits  INTERVENTIONS:  - Monitor labs and assess patient for signs and symptoms of electrolyte imbalances  - Administer electrolyte replacement as ordered  - Monitor response to electrolyte replacements, including repeat lab results as appropriate  - Instruct patient on fluid and nutrition as appropriate  Outcome: Not Progressing    Goal: Fluid balance maintained  INTERVENTIONS:  - Monitor labs and assess for signs and symptoms of volume excess or deficit  - Monitor I/O and WT  - Instruct patient on fluid and nutrition as appropriate  Outcome: Not Progressing    Goal: Glucose maintained within target range  INTERVENTIONS:  - Monitor Blood Glucose as ordered  - Assess for signs and symptoms of hyperglycemia and hypoglycemia  - Administer ordered medications to maintain glucose within target range  - Assess nutritional intake and initiate nutrition service referral as needed  Outcome: Not Progressing

## 2017-12-19 NOTE — H&P
INTERNAL MEDICINE HISTORY AND PHYSICAL  [facility]  SOD Team C     NAME: Mitzy Soriano  AGE: 76 y o  SEX: female  : 1940   MRN: [id number]  ENCOUNTER: [id number]    DATE: 2017  TIME: 3:19 PM    Primary Care Physician: Ysabel Coyle MD  Admitting Provider: Misty Mendoza MD    Chief complaint: Flash pulmonary edema    History of Present Illness     Mitzy Soriano is a 76 y o  female with extensive past medical history some of which includes CAD s/p CABG in , DM 2, severe aortic stenosis, hypertension, atrial flutter on Coumadin, who presented to [facility] earlier today for an outpatient elective procedure  She had an arteriogram completed for a left great toe nonhealing wound  The arteriogram with diagnostic and showed severe tibial disease, no intervention was performed  Prior to the procedure the patient was given IVF hydration with NSS at 75 cc/hour  Prior to discharge the patient complained of acute onset SOB and heavy chest pressure described as an elephant sitting on her chest  Rapid response was called as the patient became hypoxic with an O2 sat of 87% on RA and elevated BP in the 543 systolic  Chest x-ray showed diffuse pulmonary edema  The patient was promptly placed on BiPAP and given IV Lasix 40 mg x2 doses  ECG showed AFib with RVR and chronic LBBB  She was also started on a nitro gtt  First troponin was 0 02  Blood pressure improved and her symptoms and oxygenation resolved in minutes after initiation of therapy  The patient's She did not take her home Lasix or Toprol XL this morning  The patient's last ECHO in 2017 showed EF 60%, concentric hypertrophy, LVH, severe aortic stenosis, moderate mitral stenosis and mild TR  The patient will be admitted to our medicine service for further management of her chest pain and flash pulmonary edema  Review of Systems   Review of Systems   Constitutional: Negative for activity change, chills and fever     Respiratory: Positive for chest tightness and shortness of breath  Negative for cough  Cardiovascular: Positive for chest pain  Negative for palpitations and leg swelling  Gastrointestinal: Negative for abdominal pain, blood in stool, constipation, diarrhea, nausea and vomiting  Genitourinary: Negative for dysuria  Neurological: Negative for dizziness, weakness and light-headedness  Past Medical History     Past Medical History:   Diagnosis Date    Anticoagulated     Coumadin for Aflutter    AS (aortic stenosis)     Atrial flutter (HCC)     maintained on coumadin anticoag    CAD (coronary artery disease)     Carotid stenosis, bilateral     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) ([facility])     DJD (degenerative joint disease)     DM (diabetes mellitus) (HCC)     GERD (gastroesophageal reflux disease)     HLD (hyperlipidemia)     HTN (hypertension)     Hypothyroidism     Kidney stones     MI (myocardial infarction)     Migraines     OA (osteoarthritis)     PAD (peripheral artery disease) ([facility])        Past Surgical History     Past Surgical History:   Procedure Laterality Date    APPENDECTOMY      ARTERIOGRAM  12/19/2017    CHOLECYSTECTOMY      TOTAL ABDOMINAL HYSTERECTOMY W/ BILATERAL SALPINGOOPHORECTOMY         Social History     History   Alcohol use Not on file     History   Drug use: Unknown     History   Smoking Status    Never Smoker   Smokeless Tobacco    Never Used       Family History   No family history on file      Medications Prior to Admission     Prior to Admission medications    Not on File       Allergies     Allergies   Allergen Reactions    Other Chest Pain     IVP-listed as "chest pain" in previous chart, patient stated this occurred "a long time ago" but does not remember exactly occurred     Cephalosporins     Doxycycline     Levaquin [Levofloxacin]     Toradol [Ketorolac Tromethamine]        Objective     Vitals:    12/19/17 1419 12/19/17 1425 12/19/17 1434 12/19/17 1448   BP:  132/82 146/80    Pulse: (!) 130 (!) 125 (!) 119    Resp:       Temp:       TempSrc:       SpO2: 100% 99% 99% 99%   Weight:       Height:         Body mass index is 27 62 kg/m²  Intake/Output Summary (Last 24 hours) at 12/19/17 1519  Last data filed at 12/19/17 1330   Gross per 24 hour   Intake          1300 83 ml   Output                0 ml   Net          1300 83 ml     Invasive Devices     Peripheral Intravenous Line            Peripheral IV 12/19/17 Left Arm less than 1 day    Peripheral IV 12/19/17 Right Arm less than 1 day                Physical Exam  GENERAL: Appears well-developed and well-nourished  AAOx3  In no respiratory distress  HEENT: Normocephalic and atraumatic  No scleral icterus  PERRLA  EOMI B/L  Bipap in place  NECK: Neck supple with no lymphadenopathy  Trachea midline  No JVD  CARDIOVASCULAR: S1 and S2 are present  Tachycardic  Irregularly irregular  RESPIRATORY: Crackles bilaterally  No wheezes/rhonchi  ABDOMINAL: Bowel sounds present in all 4 quadrants, non-tender, soft, non-distended  No organomegaly, rebound, or guarding  EXTREMITIES: 2+ DP and PT pulses bilaterally; no cyanosis, clubbing, or LE edema bilaterally  NEUROLOGIC: Patient is alert and oriented to person, place, and time  No focal deficits  SKIN: Skin is warm and dry  No skin lesions are present  No rashes  PSYCHIATRIC: Normal mood and affect     Lab Results: I have personally reviewed pertinent reports      CBC:   Results from last 7 days  Lab Units 12/19/17  1424   WBC Thousand/uL 13 99*   RBC Million/uL 5 61*   HEMOGLOBIN g/dL 12 9   HEMATOCRIT % 41 0   MCV fL 73*   MCH pg 23 0*   MCHC g/dL 31 5   RDW % 17 2*   MPV fL 10 8   PLATELETS Thousands/uL 440*   NRBC AUTO /100 WBCs 0   NEUTROS PCT % 73   LYMPHS PCT % 24   MONOS PCT % 3*   EOS PCT % 0   BASOS PCT % 0   NEUTROS ABS Thousands/µL 10 17*   LYMPHS ABS Thousands/µL 3 38   MONOS ABS Thousand/µL 0 36   EOS ABS Thousand/µL 0 01   , Chemistry Profile:   Results from last 7 days  Lab Units 12/19/17  1424   SODIUM mmol/L 135*   POTASSIUM mmol/L 4 0   CHLORIDE mmol/L 101   CO2 mmol/L 19*   ANION GAP mmol/L 15*   BUN mg/dL 25   CREATININE mg/dL 1 44*   GLUCOSE RANDOM mg/dL 483*   CALCIUM mg/dL 7 6*   EGFR ml/min/1 73sq m 35   , Coagulation Studies:   Results from last 7 days  Lab Units 12/19/17  0720   PROTIME seconds 17 5*   INR  1 43*   , iSTAT CHEM 8:   Results from last 7 days  Lab Units 12/19/17  1424 12/19/17  1416   SODIUM, I-STAT mmol/l  --  134*   CO2, I-STAT mmol/L  --  24   CALCIUM, IONIZED, ISTAT mmol/L  --  0 92*   EGFR ml/min/1 73sq m 35  --    GLUCOSE RANDOM mg/dL 483*  --    GLUCOSE, ISTAT mg/dl  --  456*   HEMATOCRIT, I-STAT %  --  44   HEMOGLOBIN g/dL 12 9  --    I STAT HEMOGLOBIN g/dl  --  15 0   , ABG:   , Last A1C/Lipid Panel/Thyroid Panel:   Lab Results   Component Value Date    HGBA1C 7 9 (H) 10/18/2017    HGBA1C 8 2 (H) 07/14/2017    TRIG 210 (H) 07/14/2017    TRIG 189 (H) 11/18/2016    CHOL 147 07/14/2017    CHOL 140 11/18/2016    HDL 42 07/14/2017    HDL 52 11/18/2016    LDLCALC 63 07/14/2017    LDLCALC 50 11/18/2016    [id number] 0 359 07/14/2017       Imaging: I have personally reviewed pertinent films in PACS  Ir Abdominal Angiography / Intervention  Result Date: 12/19/2017  See report in EPIC chart      EKG, Pathology, and Other Studies: I have personally reviewed pertinent reports        Medications given in Emergency Department     Medication Administration - last 24 hours from 12/18/2017 1519 to 12/19/2017 1519       Date/Time Order Dose Route Action Action by     12/19/2017 1300 sodium chloride 0 9 % infusion 0 mL/hr Intravenous Stopped Natali Smith RN     12/19/2017 0726 sodium chloride 0 9 % infusion 75 mL/hr Intravenous [address] Natali Smith RN     12/19/2017 0840 midazolam (VERSED) injection 0 5 mg Intravenous Given Rina Section, RN     12/19/2017 0823 midazolam (VERSED) injection 1 mg Intravenous Given Marta Cruz RN     12/19/2017 2775 midazolam (VERSED) injection 0 5 mg Intravenous Given Marta Cruz RN     12/19/2017 0840 fentanyl citrate (PF) 100 MCG/2ML 25 mcg Intravenous Given Marta Cruz RN     12/19/2017 5939 fentanyl citrate (PF) 100 MCG/2ML 50 mcg Intravenous Given Marta Cruz RN     12/19/2017 5206 fentanyl citrate (PF) 100 MCG/2ML 25 mcg Intravenous Given Marta Cruz RN     12/19/2017 0813 labetalol (NORMODYNE) injection 10 mg Intravenous Given Marta Cruz RN     12/19/2017 1330 sodium chloride 0 9 % infusion 0 mL/hr Intravenous Stopped Tra Cope RN     12/19/2017 0463 sodium chloride 0 9 % infusion 125 mL/hr Intravenous Rate/Dose Change Nikki Abraham RN     12/19/2017 0921 iodixanol (VISIPAQUE) 320 MG/ML injection 200 mL 28 mL Intravenous Given Aliza Gutierrez     12/19/2017 1500 nitroGLYcerin (TRIDIL) 50 mg in 250 mL infusion (premix) 0 mcg/min Intravenous Stopped Tra Cope RN     12/19/2017 1417 nitroGLYcerin (TRIDIL) 50 mg in 250 mL infusion (premix) 200 mcg/min Intravenous [address] [place] Schwab, RN     12/19/2017 1417 furosemide (LASIX) injection 40 mg 40 mg Intravenous Given Maryland Schwab, RN     12/19/2017 1406 nitroglycerin (NITROSTAT) SL tablet 0 4 mg 0 4 mg Sublingual Given Maryland Schwab, RN          Assessment and Plan     Plan:    Flash pulmonary edema   -Likely 2/2 accelerated HTN in setting of severe AS  -S/p two doses of IV Lasix 40    Resume home oral Lasix tomorrow   -can give extra doses of IV Lasix if patient becomes symptomatic again  -respiratory protocol, wean BiPAP as tolerated  -nitro drip has been discontinued and patient is symptomatic we better  -C/t home ventolin PRN and asmanex     Chest pressure, NSTEMI  -initial troponin 0 02, increased to 0 43  -NSTEMI type 2 in setting of demand versus type 1 coronary syndrome  -monitor on telemetry, repeat EKG (most recent EKG showed AFib with RVR), continue to trend troponins to see if they plateau - if continue to rise will consult with cardiology about starting heparin gtt  -cardiology following  -stress test in 2015 showed atypical chest pain with exertion but no perfusion deficits    A  Fib with RVR with hx of atrial flutter  -continues to be slightly tachycardic, got one dose of IV labetalol  -continue warfarin, was held for over a week in anticipation of procedure  -trend INR, 1 4 today  -labetalol p r n   -monitor on tele, continue home Toprol XL 50 mg a m  and 100 mg p m     CAD s/p CABG in 2004  -continue aspirin, statin    PAD  -nonhealing left great toe wound, followed by vascular surgery  -a  Gram today with no intervention, severe tibial disease    Severe AS  -cardiology following  -ECHO 3/2017 showed AS along with moderate MS and mild TR  -sees Dr Derl Boxer outpatient, was not deemed a candidate for TAVR per Dr Kim De Jesus due to small femoral vessels on borderline gradients    Chronic Diastolic CHF  -ECHO 4/1495 showed EF 60%, moderate concentric hypertrophy and LVH  -continue p o   Lasix 40 daily and Toprol XL    DM II  -A1c today is 7 7%, hold home metformin  -glucose 484 on admission, urine ketones and serum acetone negative, patient is not in DKA  -administer 15 units of Humalog x1 and place on SSI, administer additional Humalog as necessary    LEONARD, POA  -Likely 2/2 dye from a gram  -Monitor off fluids, no hx of CKD    HTN  -blood pressure stabilized now off nitro drip  -in continue home Toprol-XL and lisinopril 5 mg    Hypothyroidism  -TSH normal today, continue Synthroid 100 mcg daily    GERD  -continue Nexium (Protonix while in hospital)    HL  -Lipids 7/17 normal except for TG of 210  -Continue statin    Diet: cardiac, diabetic, low salt    Code Status: Level 1 - Full Code  VTE Pharmacologic Prophylaxis: Sequential compression device (Venodyne)  and Warfarin (Coumadin)   VTE Mechanical Prophylaxis: sequential compression device  Admission Status: INPATIENT Admission Time  I spent 45 minutes admitting the patient  This involved direct patient contact where I performed a full history and physical, reviewing previous records, and reviewing laboratory and other diagnostic studies      Luther Alicea, DO  Internal Medicine  PGY-2

## 2017-12-19 NOTE — CONSULTS
Consultation - Cardiology Team One  Jeanette Moya 76 y o  female MRN: [id number]  Unit[place]Bed#: [facility] [place] [facility]01 Encounter: [id number]    Inpatient consult to Cardiology  Consult performed by: Christelle Harrell ordered by: Karoline Franco      Physician Requesting Consult: Mandy Charles MD  Reason for Consult / Principal Problem: Pulmonary edema     History of Present Illness   HPI: Jeanette Moya is a 76y o  year old female who has a history of coronary artery disease status post CABG in 2004, peripheral artery disease, carotid stenosis bilaterally, hypertension, hyperlipidemia, type 2 diabetes, atrial flutter on chronic Coumadin, severe aortic stenosis and hypothyroidism  She follows with cardiologist Dr Curt Kayser  She presents to [facility] for elective agram by Dr Monsivais for L great toe non healing wound  Agram completed showing severe tibial disease  Patient was scheduled for discharge later in afternoon after IVF  Prior to discharge patient reported chest pain described as elephant on her chest and SOB  Rapid response called because patient became hypoxic O2 saturation 87% and BP was 181/112  Chest xray showed vascular congestion  Patient was placed on BiPAP and ordered furosemide 40 mg IV x 2 doses  ECG showed atrial fibrillation with RVR with chronic LBBB  She reports she forgot to take her metoprolol  mg last night because she fell asleep and this morning she was told to hold her metoprolol XL 50 mg and furosemide 40 mg  She was also started on nitroglycerin gtt  BP improved to 132/82 and she is now chest pain free  Troponin x 1 is negative  She is on coumadin for [facility]  INR 1 4 on admission  She is now feeling better  SOB has resolved  Nitroglycerin gtt off       Last echocardiogram 3/24/17 showed EF 60%, moderate to severe concentric hypertrophy, severe aortic stenosis, severe mitral calcification with at least moderate mitral stenosis     Review of Systems   Constitution: Negative for chills and fever    Cardiovascular: Negative for chest pain, leg swelling, orthopnea and palpitations  Respiratory: Negative for shortness of breath  Neurological: Negative for dizziness and light-headedness  Psychiatric/Behavioral: Negative for altered mental status  Historical Information   Past Medical History:   Diagnosis Date    Anticoagulated     Coumadin for Aflutter    AS (aortic stenosis)     Atrial flutter (HCC)     maintained on coumadin anticoag    CAD (coronary artery disease)     Carotid stenosis, bilateral     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) (HCC)     DJD (degenerative joint disease)     DM (diabetes mellitus) (HCC)     GERD (gastroesophageal reflux disease)     HLD (hyperlipidemia)     HTN (hypertension)     Hypothyroidism     Kidney stones     MI (myocardial infarction)     Migraines     OA (osteoarthritis)     PAD [facility]peripheral artery disease) ([facility])      Past Surgical History:   Procedure Laterality Date    APPENDECTOMY      ARTERIOGRAM  12/19/2017    CHOLECYSTECTOMY      TOTAL ABDOMINAL HYSTERECTOMY W/ BILATERAL SALPINGOOPHORECTOMY       History   Alcohol use Not on file     History   Drug use: Unknown     History   Smoking Status    Never Smoker   Smokeless Tobacco    Never Used     Family History: No family history on file      Meds/Allergies   all current active meds have been reviewed and current meds:   Current Facility-Administered Medications   Medication Dose Route Frequency    furosemide (LASIX) injection 40 mg  40 mg Intravenous Once    nitroGLYcerin (TRIDIL) 50 mg in 250 mL infusion (premix)  5-200 mcg/min Intravenous Titrated    ondansetron (ZOFRAN) injection 4 mg  4 mg Intravenous Q6H PRN    oxyCODONE-acetaminophen (PERCOCET) 5-325 mg per tablet 1 tablet  1 tablet Oral Q4H PRN       nitroGLYcerin 5-200 mcg/min Last Rate: Stopped (12/19/17 1500)       Allergies   Allergen Reactions    Other Chest Pain     IVP-listed as "chest pain" in previous chart, patient stated this occurred "a long time ago" but does not remember exactly occurred     Cephalosporins     Doxycycline     Levaquin [Levofloxacin]     Toradol [Ketorolac Tromethamine]        Objective   Vitals: Blood pressure 146/80, pulse (!) 119, temperature 98 °F (36 7 °C), temperature source Oral, resp  rate 22, height 5' 5" (1 651 m), weight 75 3 kg (166 lb), SpO2 99 %, not currently breastfeeding ,     Body mass index is 27 62 kg/m²  ,     Systolic (35OPJ), CGV:349 , Min:122 , ANX:668     Diastolic (85HPH), UFB:03, Min:59, Max:112    @ORTHOstatics  @      Intake/Output Summary (Last 24 hours) at 12/19/17 1521  Last data filed at 12/19/17 1330   Gross per 24 hour   Intake          1300 83 ml   Output                0 ml   Net          1300 83 ml     Weight (last 2 days)     Date/Time   Weight    12/19/17 0700  75 3 (166)            Invasive Devices     Peripheral Intravenous Line            Peripheral IV 12/19/17 Left Arm less than 1 day    Peripheral IV 12/19/17 Right Arm less than 1 day              Physical Exam   Constitutional: She is oriented to person, place, and time  No distress  Neck: Neck supple  Cardiovascular:   Murmur heard  Irregular rate and rhythm   Grade II systolic murmur    Pulmonary/Chest: She has no wheezes  BiPAP  Decreased    Abdominal: Soft  Bowel sounds are normal    Obese    Musculoskeletal: She exhibits no edema  Neurological: She is alert and oriented to person, place, and time  Skin: Skin is warm and dry  She is not diaphoretic  Psychiatric: She has a normal mood and affect       LABORATORY RESULTS:    Results from last 7 days  Lab Units 12/19/17  1424   TROPONIN I ng/mL 0 02     CBC with diff:   Results from last 7 days  Lab Units 12/19/17  1424 12/19/17  1416 12/14/17  1321   WBC Thousand/uL 13 99*  --  10 91*   HEMOGLOBIN g/dL 12 9  --  11 6   I STAT HEMOGLOBIN g/dl  --  15 0  --    HEMATOCRIT % 41 0  --  37 6   MCV fL 73*  -- 74*   PLATELETS Thousands/uL 440*  --  315   MCH pg 23 0*  --  22 8*   MCHC g/dL 31 5  --  30 9*   RDW % 17 2*  --  17 3*   MPV fL 10 8  --  11 4   NRBC AUTO /100 WBCs 0  --   --        CMP:  Results from last 7 days  Lab Units 12/19/17  1424 12/19/17  1416 12/14/17  1321   SODIUM mmol/L 135*  --  140   POTASSIUM mmol/L 4 0  --  4 2   CHLORIDE mmol/L 101  --  102   CO2 mmol/L 19*  --  27   ANION GAP mmol/L 15*  --  11   BUN mg/dL 25  --  25   CREATININE mg/dL 1 44*  --  0 87   GLUCOSE RANDOM mg/dL 483*  --  137   GLUCOSE, ISTAT mg/dl  --  456*  --    CALCIUM mg/dL 7 6*  --  8 0*   EGFR ml/min/1 73sq m 35  --  64       BMP:  Results from last 7 days  Lab Units 12/19/17  1424  12/14/17  1321   SODIUM mmol/L 135*  --  140   POTASSIUM mmol/L 4 0  --  4 2   CHLORIDE mmol/L 101  --  102   CO2 mmol/L 19*  --  27   BUN mg/dL 25  --  25   CREATININE mg/dL 1 44*  --  0 87   GLUCOSE RANDOM mg/dL 483*  --  137   GLUCOSE, ISTAT   --   < >  --    CALCIUM mg/dL 7 6*  --  8 0*   < > = values in this interval not displayed  Results from last 7 days  Lab Units 12/19/17  0720 12/14/17  1321   INR  1 43* 2 97*     Lipid Profile:   Lab Results   Component Value Date    CHOL 147 07/14/2017    CHOL 140 11/18/2016    CHOL 143 08/04/2016     Lab Results   Component Value Date    HDL 42 07/14/2017    HDL 52 11/18/2016    HDL 43 08/04/2016     Lab Results   Component Value Date    LDLCALC 63 07/14/2017    LDLCALC 50 11/18/2016    LDLCALC 64 08/04/2016     Lab Results   Component Value Date    TRIG 210 (H) 07/14/2017    TRIG 189 (H) 11/18/2016    TRIG 178 (H) 08/04/2016       Imaging:   Chest xray personally reviewed showing vascular congestion  Final report pending       Ir Abdominal Angiography / Intervention    Result Date: 12/19/2017  Narrative: Date: December 19, 2017 Attending: Mykel Monsivais MD Assistant: None Preoperative diagnosis: Diabetes mellitus; atherosclerosis with nonhealing small wound of the left great toe Postoperative diagnosis: Same Anesthesia: Moderate Sedation; 1% lidocaine EBL: Minimal Fluoroscopy Time: 6 2 min Contrast: 28 cc Visipaque [id number] Procedure: 1  Right common femoral artery access with 5-Cymraes sheath and manual closure 2  Aortogram with left lower extremity runoff and popliteal selective Indications: Patient is a 68-year-old female with diabetes and atherosclerosis and small nonhealing Drive fluid of the left great toe with noncompressible vessels and flatline metatarsal and great toe pressures with diffuse disease on duplex ultrasound, presents for angiogram and possible intervention  Description of Procedure: After informed consent was obtained, the patient was brought to the angiosuite and placed in the supine position  She was given conscious sedation  She was prepped and draped in usual sterile fashion exposing the right groin  The patient was noted to be hypertensive and tachycardic likely due to not taking her metoprolol as scheduled  Labetalol was given and her pressure and heart rate were normalized prior to beginning the procedure  A timeout was performed  Under direct  ultrasound guidance, the right common femoral artery was accessed with an access needle  A still frame images saved  A Bentson wire was inserted into the aorta, and the needle was exchanged for a short 5-Cymraes sheath  An Omni flush catheter was inserted over the wire and placed in the aorta at the level of L1  Abdominal angiogram was performed  The Bentson wire was then reinserted and used to get up and over the aortic bifurcation  With the catheter at the level of the left common femoral artery, left lower extremity runoff was performed  The tibial vessels were poorly visualized and most the wire was used to manipulate the catheter into the superficial femoral artery  Runoff was again performed and again poorly visualized    Thus, the catheter was exchanged for a 100 cm angled glide catheter which was placed at the level of the popliteal artery  Tibial runoff was again performed and was much better visualized  The catheter was then removed over a wire  The sheath was removed and manual pressure was held until hemostasis was achieved  The patient was transferred out of the unit for post procedural care  Findings: 1  Patent bilateral renal arteries and grossly patent superior mesenteric artery 2  Patent abdominal aorta 3  Tortuous but patent bilateral common iliac arteries, internal iliac arteries, external iliac arteries 4  Patent left common femoral artery, profunda femoral artery, superficial femoral artery; superficial femoral artery with mild diffuse disease throughout, without focal stenosis; patent left popliteal artery also with diffuse disease but no focal stenosis 5  Severe three-vessel tibial disease; anterior tibial artery is occluded just after its origin with reconstitution in the dorsalis pedis which is small and diseased; posterior tibial artery collateralizes shortly after its origin without reconstitution  of true posterior tibial vessel; peroneal is patent proximally and becomes atretic and occluded at its mid segment; there appears to be peroneal reconstitution but this vessel is small and diseased with collaterals only going into the foot Complications: none Workstation performed: [id number]       EKG reviewed personally:  Atrial fibrillation with RVR and LBBB (chronic)  Ventricular rate 126 bpm  QRSD interval 144 ms  QT interval 398 ms  QTc interval 576 ms    Telemetry reviewed personally:   No telemetry     Assessment/ Plan    1  Pulmonary edema likely secondary to accelerated hypertension, missed diuretic dose due to Angiogram today and IVF post procedure with history of severe aortic stenosis  Patient received furosemide 40 mg IV x 2 dose  She was placed on BiPAP and started on nitroglycerin gtt  Nitro gtt now titrated off  Can wean O2 as tolerated  SOB has now improved       2  Chest pain- likely secondary to #1  Troponin x 1 0 02  Reviewed ECG, showing afib with LBBB  LBBB not new  Chest pain now resolved  Continue to monitor symptoms  Trend troponin    3  Atrial fibrillation with RVR- patient has history of atrial flutter  ECG reviewed  Restart home medication Metoprolol XL 50 mg PO Q AM and 100 mg PO Q PM   Patient missed two doses   Patient on coumadin for [address]  INR 1 43 today prior to agram   Recommend restart coumadin   Monitor on telemetry     4  CAD- s/p CABG LIMA to LAD, SVG to D1, SVG to OM and SVG to PDA in 2004  Continue home medications: aspirin and atorvastatin 80 mg PO daily    5  PAD with non healing L great toe wound- followed by vascular surgery  Patient had diagnostic agram today with no intervention showing severe tibial disease  On aspirin and statin at home  6  Severe aortic stenosis- last echocardiogram 3/24/17 showed EF 60%, moderate to severe concentric hypertrophy, severe aortic stenosis, severe mitral calcification with at least moderate mitral stenosis   Patient follows with Dr Cindy Goodrich and was referred to Dr Darlyn Simon for TAVR workup  CTA showed no porcelain aorta with small caliber but patent aorta and iliac arteries  She was found to have rather small femoral vessels and borderline gradients therefor TAVR was not pursued      Thank you for allowing us to participate in this patient's care  This pt will follow up with Dr Cindy Goodrich once discharged  Counseling / Coordination of Care  Total floor / unit time spent today 45 minutes  Greater than 50% of total time was spent with the patient and / or family counseling and / or coordination of care  A description of the counseling / coordination of care: Review of history, current assessment, development of a plan  Code Status: Level 1 - Full Code    ** Please Note: Dragon 360 Dictation voice to text software may have been used in the creation of this document   **

## 2017-12-19 NOTE — PROGRESS NOTES
This nurse went in to review D/C instructions with Pt at 1400, at that time pt expressed not feeling well, "I am having trouble breathing, I didn't take my asmanex or ventolin this morning" Pt stated it felt like an elephant was sitting on her chest - Rr22 labored /86, Spo2 86% on Ra  Lungs with expiratory crackles throughout, oxygen applied via N/C, Spo2 noted in the mid 90's  HOB elevated, deep breathing encouraged  Attempted to obtained EKG, but unsuccessful due to increased resp distress and used of accessory muscles with breathing  Rapid response called

## 2017-12-19 NOTE — RESPIRATORY THERAPY NOTE
RT Protocol Note  Judie Conley 76 y o  female MRN: [id number]  Unit/Bed#: [facility] 529-01 Encounter: [id number]    Assessment    Principal Problem:    Acute pulmonary edema ([facility] )  Active Problems:    PAD (peripheral artery disease) ([facility])      Home Pulmonary Medications:  reviewed    Past Medical History:   Diagnosis Date    Anticoagulated     Coumadin for Aflutter    AS (aortic stenosis)     Atrial flutter ([facility])     maintained on coumadin anticoag    CAD (coronary artery disease)     Carotid stenosis, bilateral     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) ([facility])     DJD (degenerative joint disease)     DM (diabetes mellitus) ([facility])     GERD (gastroesophageal reflux disease)     HLD (hyperlipidemia)     HTN (hypertension)     Hypothyroidism     Kidney stones     MI (myocardial infarction)     Migraines     OA (osteoarthritis)     PAD (peripheral artery disease) ([place] [address] )      Social History     Social History    Marital status:      Spouse name: N/A    Number of children: N/A    Years of education: N/A     Social History Main Topics    Smoking status: Never Smoker    Smokeless tobacco: Never Used    Alcohol use No    Drug use: No    Sexual activity: No     Other Topics Concern    None     Social History Narrative    None       Subjective         Objective    Physical Exam:   Assessment Type: Assess only  General Appearance: Alert, Awake  Respiratory Pattern: Spontaneous  Chest Assessment: Chest expansion symmetrical  Bilateral Breath Sounds: Crackles  R Breath Sounds: Crackles  L Breath Sounds: Crackles  Cough: None  O2 Device: bipap    Vitals:  Blood pressure 132/76, pulse 105, temperature 98 °F (36 7 °C), temperature source Axillary, resp  rate 20, height 5' 5" (1 651 m), weight 75 1 kg (165 lb 9 1 oz), SpO2 99 %, not currently breastfeeding  Imaging and other studies: I have personally reviewed pertinent reports        O2 Device: bipap     Plan    Respiratory Plan: Home Bronchodilator Patient pathway, Vent/NIV/HFNC        Resp Comments: Pt assessed per respiratory protocol  Pt currently not in acute resp distress, pattern regular/unlabored and denies dyspnea  Pt ulitlizes Asmanex and Albuterol mdi prn at home  Based on the physical assessment and medical history, I will order prn Albuterol  Also will continue to monitor and assess per respiratory protocol

## 2017-12-20 LAB
ALBUMIN SERPL BCP-MCNC: 3.1 G/DL (ref 3.5–5)
ALP SERPL-CCNC: 75 U/L (ref 46–116)
ALT SERPL W P-5'-P-CCNC: 25 U/L (ref 12–78)
ANION GAP SERPL CALCULATED.3IONS-SCNC: 9 MMOL/L (ref 4–13)
AST SERPL W P-5'-P-CCNC: 27 U/L (ref 5–45)
BILIRUB SERPL-MCNC: 0.73 MG/DL (ref 0.2–1)
BUN SERPL-MCNC: 30 MG/DL (ref 5–25)
CALCIUM SERPL-MCNC: 7.4 MG/DL (ref 8.3–10.1)
CHLORIDE SERPL-SCNC: 101 MMOL/L (ref 100–108)
CO2 SERPL-SCNC: 28 MMOL/L (ref 21–32)
CREAT SERPL-MCNC: 0.96 MG/DL (ref 0.6–1.3)
ERYTHROCYTE [DISTWIDTH] IN BLOOD BY AUTOMATED COUNT: 17.3 % (ref 11.6–15.1)
GFR SERPL CREATININE-BSD FRML MDRD: 56 ML/MIN/1.73SQ M
GLUCOSE SERPL-MCNC: 165 MG/DL (ref 65–140)
GLUCOSE SERPL-MCNC: 167 MG/DL (ref 65–140)
GLUCOSE SERPL-MCNC: 211 MG/DL (ref 65–140)
GLUCOSE SERPL-MCNC: 224 MG/DL (ref 65–140)
GLUCOSE SERPL-MCNC: 242 MG/DL (ref 65–140)
HCT VFR BLD AUTO: 35.1 % (ref 34.8–46.1)
HGB BLD-MCNC: 11.2 G/DL (ref 11.5–15.4)
INR PPP: 1.56 (ref 0.86–1.16)
MCH RBC QN AUTO: 23.1 PG (ref 26.8–34.3)
MCHC RBC AUTO-ENTMCNC: 31.9 G/DL (ref 31.4–37.4)
MCV RBC AUTO: 72 FL (ref 82–98)
PLATELET # BLD AUTO: 338 THOUSANDS/UL (ref 149–390)
PMV BLD AUTO: 10.9 FL (ref 8.9–12.7)
POTASSIUM SERPL-SCNC: 3.9 MMOL/L (ref 3.5–5.3)
PROT SERPL-MCNC: 7.2 G/DL (ref 6.4–8.2)
PROTHROMBIN TIME: 18.8 SECONDS (ref 12.1–14.4)
RBC # BLD AUTO: 4.85 MILLION/UL (ref 3.81–5.12)
SODIUM SERPL-SCNC: 138 MMOL/L (ref 136–145)
TROPONIN I SERPL-MCNC: 2.38 NG/ML
TROPONIN I SERPL-MCNC: 2.85 NG/ML
TROPONIN I SERPL-MCNC: 2.88 NG/ML
WBC # BLD AUTO: 12.27 THOUSAND/UL (ref 4.31–10.16)

## 2017-12-20 PROCEDURE — 85610 PROTHROMBIN TIME: CPT | Performed by: INTERNAL MEDICINE

## 2017-12-20 PROCEDURE — 85027 COMPLETE CBC AUTOMATED: CPT | Performed by: INTERNAL MEDICINE

## 2017-12-20 PROCEDURE — 80053 COMPREHEN METABOLIC PANEL: CPT | Performed by: INTERNAL MEDICINE

## 2017-12-20 PROCEDURE — 82948 REAGENT STRIP/BLOOD GLUCOSE: CPT

## 2017-12-20 PROCEDURE — 84484 ASSAY OF TROPONIN QUANT: CPT | Performed by: STUDENT IN AN ORGANIZED HEALTH CARE EDUCATION/TRAINING PROGRAM

## 2017-12-20 PROCEDURE — 84484 ASSAY OF TROPONIN QUANT: CPT | Performed by: NURSE PRACTITIONER

## 2017-12-20 RX ORDER — BISACODYL 10 MG
10 SUPPOSITORY, RECTAL RECTAL ONCE
Status: COMPLETED | OUTPATIENT
Start: 2017-12-20 | End: 2017-12-20

## 2017-12-20 RX ORDER — FUROSEMIDE 10 MG/ML
40 INJECTION INTRAMUSCULAR; INTRAVENOUS
Status: DISCONTINUED | OUTPATIENT
Start: 2017-12-20 | End: 2017-12-21

## 2017-12-20 RX ADMIN — FLUTICASONE PROPIONATE 2 PUFF: 220 AEROSOL, METERED RESPIRATORY (INHALATION) at 07:55

## 2017-12-20 RX ADMIN — ALBUTEROL SULFATE 2 PUFF: 90 AEROSOL, METERED RESPIRATORY (INHALATION) at 21:21

## 2017-12-20 RX ADMIN — ASPIRIN 81 MG 81 MG: 81 TABLET ORAL at 08:00

## 2017-12-20 RX ADMIN — INSULIN LISPRO 2 UNITS: 100 INJECTION, SOLUTION INTRAVENOUS; SUBCUTANEOUS at 16:52

## 2017-12-20 RX ADMIN — LEVOTHYROXINE SODIUM 100 MCG: 100 TABLET ORAL at 05:17

## 2017-12-20 RX ADMIN — METOPROLOL SUCCINATE 50 MG: 50 TABLET, EXTENDED RELEASE ORAL at 07:51

## 2017-12-20 RX ADMIN — BISACODYL 10 MG: 10 SUPPOSITORY RECTAL at 22:08

## 2017-12-20 RX ADMIN — ATORVASTATIN CALCIUM 80 MG: 80 TABLET, FILM COATED ORAL at 08:12

## 2017-12-20 RX ADMIN — METOPROLOL SUCCINATE 100 MG: 100 TABLET, FILM COATED, EXTENDED RELEASE ORAL at 21:17

## 2017-12-20 RX ADMIN — FUROSEMIDE 40 MG: 40 TABLET ORAL at 08:12

## 2017-12-20 RX ADMIN — WARFARIN SODIUM 3 MG: 1 TABLET ORAL at 17:06

## 2017-12-20 RX ADMIN — FUROSEMIDE 40 MG: 10 INJECTION, SOLUTION INTRAMUSCULAR; INTRAVENOUS at 09:00

## 2017-12-20 RX ADMIN — LISINOPRIL 5 MG: 5 TABLET ORAL at 21:17

## 2017-12-20 RX ADMIN — ALBUTEROL SULFATE 2 PUFF: 90 AEROSOL, METERED RESPIRATORY (INHALATION) at 07:54

## 2017-12-20 RX ADMIN — Medication 400 MG: at 17:02

## 2017-12-20 RX ADMIN — INSULIN LISPRO 1 UNITS: 100 INJECTION, SOLUTION INTRAVENOUS; SUBCUTANEOUS at 07:54

## 2017-12-20 RX ADMIN — FUROSEMIDE 40 MG: 10 INJECTION, SOLUTION INTRAMUSCULAR; INTRAVENOUS at 16:51

## 2017-12-20 RX ADMIN — Medication 400 MG: at 07:51

## 2017-12-20 RX ADMIN — VITAMIN D, TAB 1000IU (100/BT) 1000 UNITS: 25 TAB at 08:12

## 2017-12-20 RX ADMIN — FLUTICASONE PROPIONATE 2 PUFF: 220 AEROSOL, METERED RESPIRATORY (INHALATION) at 21:16

## 2017-12-20 RX ADMIN — PANTOPRAZOLE SODIUM 20 MG: 20 TABLET, DELAYED RELEASE ORAL at 05:17

## 2017-12-20 RX ADMIN — INSULIN LISPRO 2 UNITS: 100 INJECTION, SOLUTION INTRAVENOUS; SUBCUTANEOUS at 21:16

## 2017-12-20 RX ADMIN — INSULIN LISPRO 2 UNITS: 100 INJECTION, SOLUTION INTRAVENOUS; SUBCUTANEOUS at 12:30

## 2017-12-20 NOTE — PROGRESS NOTES
Per Dr Олег Bermudez is to continue IV Diuretics today, monitor and possible transition to po lasix tomorrow  Possible DC to home as last Troponin trended down  Titrated down NC O2 from 3L to 1L    Pt states feeling better,

## 2017-12-20 NOTE — CASE MANAGEMENT
Initial Clinical Review    Admission Date/ Time:   12/19/17 AT 1438 EMERGENT INPATIENT ADMISSION FROM INT RAD POST OUTPT ARTERIOGRAM 2ND SUDDEN SEVERE RESP DISTRESS WITH  HYPOXIA + RAPID ATRIAL FIB WITH CH X RAY +PULM EDEMA - ON BIPAP +GIVEN  IV LASIX  12/19/17 1438  Inpatient Admission Once     Transfer Service: General Medicine       Question Answer Comment   Admitting Physician Bard Underwood    Level of Care Level 2 Stepdown / HOT    Bed Type [place]    Estimated length of stay More than 2 Midnights    Certification I certify that inpatient services are medically necessary for this patient for a duration of greater than two midnights  See H&P and MD Progress Notes for additional information about the patient's course of treatment  Date/Time/Mode of Arrival:   12/19/17 AT 1438 EMERGENT INPATIENT ADMISSION FROM INT RAD POST OUTPT ARTERIOGRAM 2ND SUDDEN SEVERE RESP DISTRESS WITH  HYPOXIA + RAPID ATRIAL FIB WITH CH X RAY +PULM EDEMA - ON BIPAP +GIVEN  IV LASIX       Chief complaint: Flash pulmonary edema     History of Present Illness      Freya Nichols is a 76 y o  female with extensive past medical history some of which includes CAD s/p CABG in 2004, DM 2, severe aortic stenosis, hypertension, atrial flutter on Coumadin, who presented to [facility] earlier today for an outpatient elective procedure  She had an arteriogram completed for a left great toe nonhealing wound  The arteriogram with diagnostic and showed severe tibial disease, no intervention was performed  Prior to the procedure the patient was given IVF hydration with NSS at 75 cc/hour  Prior to discharge the patient complained of acute onset SOB and heavy chest pressure described as an elephant sitting on her chest      Rapid response was called as the patient became hypoxic with an O2 sat of 87% on RA and elevated BP in the 978 systolic  Chest x-ray showed diffuse pulmonary edema   The patient was promptly placed on BiPAP and given IV Lasix 40 mg x2 doses  ECG showed AFib with RVR and chronic LBBB  She was also started on a nitro gtt  First troponin was 0 02  Blood pressure improved and her symptoms and oxygenation resolved in minutes after initiation of therapy  The patient's She did not take her home Lasix or Toprol XL this morning  The patient's last ECHO in 03/2017 showed EF 60%, concentric hypertrophy, LVH, severe aortic stenosis, moderate mitral stenosis and mild TR  The patient will be admitted to our medicine service for further management of her chest pain and flash pulmonary edema       Review of Systems     Constitutional: Negative for activity change, chills and fever  Respiratory: Positive for chest tightness and shortness of breath  Negative for cough  Cardiovascular: Positive for chest pain  Negative for palpitations and leg swelling  Gastrointestinal: Negative for abdominal pain, blood in stool, constipation, diarrhea, nausea and vomiting  Genitourinary: Negative for dysuria  Neurological: Negative for dizziness, weakness and light-headedness  Physical Exam  GENERAL: Appears well-developed and well-nourished  AAOx3  Sherre Soulier HEENT: Normocephalic and atraumatic  No scleral icterus  PERRLA  EOMI B/L  Bipap in place  NECK: Neck supple with no lymphadenopathy  Trachea midline  No JVD  CARDIOVASCULAR: S1 and S2 are present  Tachycardic  Irregularly irregular  RESPIRATORY: Crackles bilaterally  No wheezes/rhonchi  ABDOMINAL: Bowel sounds present in all 4 quadrants, non-tender, soft, non-distended  No organomegaly, rebound, or guarding  EXTREMITIES: 2+ DP and PT pulses bilaterally; no cyanosis, clubbing, or LE edema bilaterally  NEUROLOGIC: Patient is alert and oriented to person, place, and time  No focal deficits  SKIN: Skin is warm and dry  No skin lesions are present  No rashes           ED Vital Signs:   ED Triage Vitals   Temperature Pulse Respirations Blood Pressure SpO2   12/19/17 0700 12/19/17 0700 12/19/17 0700 12/19/17 0700 12/19/17 0700   98 °F (36 7 °C) 69 18 130/69 99 %      Temp Source Heart Rate Source Patient Position - Orthostatic VS BP Location FiO2 (%)   12/19/17 0700 12/19/17 1400 12/19/17 0700 12/19/17 0700 --   Oral Monitor Lying Left arm       Pain Score       12/19/17 1549       No Pain        Wt Readings from Last 1 Encounters:   12/20/17 76 9 kg (169 lb 8 5 oz)      12/19 0701  12/20 0700 12/20 0701  12/20 1434  Most Recent    Temperature (°F) 9898 5 97 598 2  97 5 (36 4)    Pulse 77146 7780  77    Respirations 1622 20  20    Blood Pressure 116/63187/112 102/59125/71  102/59    Arterial Line BP -1/-1        SpO2 (%) 87100 9598  95        Respiratory Data (Last 48 hours)     12/19 0700 12/19 1448 12/19 1600 12/19 2310 12/20 0352             Interface  Full fa    Non-Invasive Ventilation Mode  BiPAP           IPAP (cm) (cm)  12           EPAP (cm) (cm)  8           FiO2 (%)  80           O2 Device None (R  Other (  Nasal c  Nasal c          O2 Flow Rate (L/min) (L/min)    2 2              LABS/Diagnostic Test Results:   POCT Blood Gas (CG8+) [[id number]] (Abnormal)  Lab Status: Final result Updated: 12/19/17 1428    ph, Dusty ISTAT 7 223 (LL) 7 300 - 7 400     pCO2, Dusty i-STAT 55 1 (H) 42 0 - 50 0 mm HG     pO2, Dusty i-STAT 45 0 35 0 - 45 0 mm HG     BE, i-STAT -5 (L) -2 - 3 mmol/L     HCO3, Dusty i-STAT 22 7 (L) 24 0 - 30 0 mmol/L     CO2, i-STAT 24 21 - 32 mmol/L     O2 Sat, i-STAT 71 (L) 95 - 98 %     SODIUM, I-STAT 134 (L) 136 - 145 mmol/l     Potassium, i-STAT 8 7 (HH) 3 5 - 5 3 mmol/L     Calcium, Ionized i-STAT 0 92 (L) 1 12 - 1 32 mmol/L     Hct, i-STAT 44 34 8 - 46 1 %     Hgb, i-STAT 15 0 11 5 - 15 4 g/dl     Glucose, i-STAT 456 (H) 65 - 140 mg/dl     Specimen Type VENOUS     CBC  Results from last 7 days  Lab Units 12/19/17  1424   WBC Thousand/uL 13 99*   RBC Million/uL 5 61*   HEMOGLOBIN g/dL 12 9   HEMATOCRIT % 41 0   MCV fL 73*   MCH pg 23 0* MCHC g/dL 31 5   RDW % 17 2*   MPV fL 10 8   PLATELETS Thousands/uL 440*   NRBC AUTO /100 WBCs 0   NEUTROS PCT % 73   LYMPHS PCT % 24   MONOS PCT % 3*   EOS PCT % 0   BASOS PCT % 0   NEUTROS ABS Thousands/µL 10 17*   LYMPHS ABS Thousands/µL 3 38   MONOS ABS Thousand/µL 0 36   EOS ABS Thousand/µL 0 01   :   CMP  Results from last 7 days  Lab Units 12/19/17  1424   SODIUM mmol/L 135*   POTASSIUM mmol/L 4 0   CHLORIDE mmol/L 101   CO2 mmol/L 19*   ANION GAP mmol/L 15*   BUN mg/dL 25   CREATININE mg/dL 1 44*   GLUCOSE RANDOM mg/dL 483*   CALCIUM mg/dL 7 6*   EGFR ml/min/1 73sq m 35     Lab Units 12/19/17  0720   PROTIME seconds 17 5*   INR   1 43*       TROPONIN   0 43,  1 34,  2 88,  2 85, 2 38      CHEST X RAY -  Findings compatible with pulmonary edema/CHF  EKG (per Card MD):  Atrial fibrillation with RVR and LBBB (chronic)  Ventricular rate 126 bpm  QRSD interval 144 ms  QT interval 398 ms  QTc interval 576 ms        Past Medical/Surgical History:   Past Medical History:   Diagnosis Date    Anticoagulated     AS (aortic stenosis)     At[facility] flutter ([facility])     CAD (coronary artery disease)     Carotid stenosis, bilateral     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) ([facility])     DJD (degenerative joint disease)     DM (diabetes mellitus) ([facility])     GERD (gastroesophageal reflux disease)     HLD (hyperlipidemia)     HTN (hypertension)     Hypothyroidism     Kidney stones     MI (myocardial infarction)     Migraines     OA (osteoarthritis)     PAD (peripheral artery disease) ([facility])        Admitting Diagnosis: Aorto-iliac atherosclerosis ([facility]) [I70 0, I70 299]  Ulcer of lower limbs, except decubitus ulcer ([facility] [address] ) [Q47 150]    Age/Sex: 76 y o  female      Assessment and Plan      Plan:   Flash pulmonary edema   -Likely 2/2 accelerated HTN in setting of severe AS  -S/p two doses of IV Lasix 40    Resume home oral Lasix tomorrow   -can give extra doses of IV Lasix if patient becomes symptomatic again  -respiratory protocol, wean BiPAP as tolerated  -nitro drip has been discontinued and patient is symptomatic we better  -C/t home ventolin PRN and asmanex      Chest pressure, NSTEMI  -initial troponin 0 02, increased to 0 43  -NSTEMI type 2 in setting of demand versus type 1 coronary syndrome  -monitor on telemetry, repeat EKG (most recent EKG showed AFib with RVR), continue to trend troponins to see if they plateau - if continue to rise will consult with cardiology about starting heparin gtt  -cardiology following  -stress test in 2015 showed atypical chest pain with exertion but no perfusion deficits     A  Fib with RVR with hx of atrial flutter  -continues to be slightly tachycardic, got one dose of IV labetalol  -continue warfarin, was held for over a week in anticipation of procedure  -trend INR, 1 4 today  -labetalol p r n   -monitor on tele, continue home Toprol XL 50 mg a m  and 100 mg p m      CAD s/p CABG in 2004  -continue aspirin, statin     PAD  -nonhealing left great toe wound, followed by vascular surgery  -a  Gram today with no intervention, severe tibial disease     Severe AS  -cardiology following  -ECHO 3/2017 showed AS along with moderate MS and mild TR  -sees Dr Tricia Coats outpatient, was not deemed a candidate for TAVR per Dr Regan Gongora due to small femoral vessels on borderline gradients     Chronic Diastolic CHF  -ECHO 3/7316 showed EF 60%, moderate concentric hypertrophy and LVH  -continue p o   Lasix 40 daily and Toprol XL     DM II  -A1c today is 7 7%, hold home metformin  -glucose 484 on admission, urine ketones and serum acetone negative, patient is not in DKA  -administer 15 units of Humalog x1 and place on SSI, administer additional Humalog as necessary     LEONARD, POA  -Likely 2/2 dye from a gram  -Monitor off fluids, no hx of CKD     HTN  -blood pressure stabilized now off nitro drip  -in continue home Toprol-XL and lisinopril 5 mg     Hypothyroidism  -TSH normal today, continue Synthroid 100 mcg daily     GERD  -continue Nexium (Protonix while in hospital)     HL  -Lipids 7/17 normal except for TG of 210  -Continue statin     Diet: cardiac, diabetic, low salt     Code Status: Level 1 - Full Code  VTE Pharmacologic Prophylaxis: Sequential compression device (Venodyne)  and Warfarin (Coumadin)   VTE Mechanical Prophylaxis: sequential compression device    Admission Status: INPATIENT          Admission Orders:  12/19/17 AT 1438  ADMIT INPATIENT  TO LEVEL 2 STEPDOWN  TELEMETRY  VS Q4HRS    Up with Assistance  SCD    BIPAP 12/8 with 80% FiO2  Continuous Pulse Oximetry    Diet Wesly/CHO Controlled; Consistent Carbohydrate Diet Level 3 (6 carb servings/90 grams CHO/meal);  Cardiac Step 1, Fluid Restriction 1800 ML, Sodium 4 GM (RUPERT)    IV ACCESS    Scheduled Meds:   aspirin 81 mg Oral Daily   atorvastatin 80 mg Oral Daily   cholecalciferol 1,000 Units Oral Daily   fluticasone 2 puff Inhalation BID   furosemide 40 mg Intravenous BID (diuretic)   insulin lispro 1-5 Units Subcutaneous HS   insulin lispro 1-6 Units Subcutaneous TID AC   levothyroxine 100 mcg Oral Early Morning   lisinopril 5 mg Oral HS   magnesium oxide 400 mg Oral BID   metoprolol succinate 100 mg Oral HS   metoprolol succinate 50 mg Oral Daily With Breakfast   pantoprazole 20 mg Oral Early Morning   warfarin 3 mg Oral Once per day on Sun Mon Wed Thu Fri Sat   warfarin 6 mg Oral Once per day on Tue     PRN Meds:     Albuterol inhaler 2 puffs q4hrs prn given x 1/ 24 hrs    labetalol    nitroglycerin    ondansetron    oxyCODONE-acetaminophen    Consult Card

## 2017-12-20 NOTE — PROGRESS NOTES
General Cardiology   Progress Note -  Team One   Germania Box 76 y o  female MRN: [id number]    Unit/Bed#: [facility] 529-01 Encounter: [id number]        Subjective:    No significant events overnight  Patient OOB in chair  She reports no further complaint of chest pain  No fever or chills  No further SOB  No palpitations or dizziness  Review of Systems   Constitution: Negative for chills and fever  Cardiovascular: Negative for chest pain, dyspnea on exertion and leg swelling  Respiratory: Negative for shortness of breath  Musculoskeletal: Negative for falls  Gastrointestinal: Negative for nausea and vomiting  Neurological: Negative for dizziness and light-headedness  Psychiatric/Behavioral: Negative for altered mental status  Objective:   Vitals: Blood pressure 102/59, pulse 77, temperature 97 5 °F (36 4 °C), resp  rate 20, height 5' 5" (1 651 m), weight 76 9 kg (169 lb 8 5 oz), SpO2 95 %, not currently breastfeeding ,       Body mass index is 28 21 kg/m²  ,     Systolic (91ZWG), LJA:757 , Min:102 , QDD:381     Diastolic (79MVD), BHH:39, Min:59, Max:76      Orthostatic Blood Pressures    Flowsheet Row Most Recent Value   Blood Pressure  102/59 filed at 12/20/2017 1105   Patient Position - Orthostatic VS  Sitting filed at 12/19/2017 1913            Intake/Output Summary (Last 24 hours) at 12/20/17 1444  Last data filed at 12/20/17 1206   Gross per 24 hour   Intake              280 ml   Output             2960 ml   Net            -2680 ml     Weight (last 2 days)     Date/Time   Weight    12/20/17 0522  76 9 (169 53)    12/19/17 1600  75 1 (165 57)    12/19/17 0700  75 3 (166)            Telemetry Review: Normal sinus rhythm       Physical Exam   Constitutional: She is oriented to person, place, and time  No distress  Neck: Neck supple  No JVD present  Cardiovascular: Normal rate, regular rhythm and intact distal pulses  Murmur heard    Grade II systolic murmur    Pulmonary/Chest: Effort normal and breath sounds normal  No respiratory distress  She has no wheezes  NC    Abdominal: Soft  Bowel sounds are normal  She exhibits no distension  Musculoskeletal: She exhibits no edema  Neurological: She is alert and oriented to person, place, and time  Skin: Skin is warm and dry  She is not diaphoretic  Psychiatric: She has a normal mood and affect   Her behavior is normal        LABORATORY RESULTS    Results from last 7 days  Lab Units 12/20/17  0806 12/20/17  0537 12/19/17  2141   TROPONIN I ng/mL 2 38* 2 85*  2 88* 1 34*     CBC with diff:   Results from last 7 days  Lab Units 12/20/17  0537 12/19/17  1424 12/19/17  1416 12/14/17  1321   WBC Thousand/uL 12 27* 13 99*  --  10 91*   HEMOGLOBIN g/dL 11 2* 12 9  --  11 6   I STAT HEMOGLOBIN g/dl  --   --  15 0  --    HEMATOCRIT % 35 1 41 0  --  37 6   MCV fL 72* 73*  --  74*   PLATELETS Thousands/uL 338 440*  --  315   MCH pg 23 1* 23 0*  --  22 8*   MCHC g/dL 31 9 31 5  --  30 9*   RDW % 17 3* 17 2*  --  17 3*   MPV fL 10 9 10 8  --  11 4   NRBC AUTO /100 WBCs  --  0  --   --        CMP:  Results from last 7 days  Lab Units 12/20/17  0537 12/19/17  1424 12/19/17  1416 12/14/17  1321   SODIUM mmol/L 138 135*  --  140   POTASSIUM mmol/L 3 9 4 0  --  4 2   CHLORIDE mmol/L 101 101  --  102   CO2 mmol/L 28 19*  --  27   ANION GAP mmol/L 9 15*  --  11   BUN mg/dL 30* 25  --  25   CREATININE mg/dL 0 96 1 44*  --  0 87   GLUCOSE RANDOM mg/dL 167* 483*  --  137   GLUCOSE, ISTAT mg/dl  --   --  456*  --    CALCIUM mg/dL 7 4* 7 6*  --  8 0*   AST U/L 27  --   --   --    ALT U/L 25  --   --   --    ALK PHOS U/L 75  --   --   --    TOTAL PROTEIN g/dL 7 2  --   --   --    ALBUMIN g/dL 3 1*  --   --   --    BILIRUBIN TOTAL mg/dL 0 73  --   --   --    EGFR ml/min/1 73sq m 56 35  --  64       BMP:  Results from last 7 days  Lab Units 12/20/17  0537 12/19/17  1424  12/14/17  1321   SODIUM mmol/L 138 135*  --  140   POTASSIUM mmol/L 3 9 4 0  --  4 2   CHLORIDE mmol/L 101 101  --  102   CO2 mmol/L 28 19*  --  27   BUN mg/dL 30* 25  --  25   CREATININE mg/dL 0 96 1 44*  --  0 87   GLUCOSE RANDOM mg/dL 167* 483*  --  137   GLUCOSE, ISTAT   --   --   < >  --    CALCIUM mg/dL 7 4* 7 6*  --  8 0*   < > = values in this interval not displayed  Results from last 7 days  Lab Units 12/19/17  1424   MAGNESIUM mg/dL 1 1*          Results from last 7 days  Lab Units 12/19/17  1649   HEMOGLOBIN A1C % 7 7*          Results from last 7 days  Lab Units 12/19/17  1424   TSH 3RD GENERATON uIU/mL 0 763         Results from last 7 days  Lab Units 12/20/17  0537 12/19/17  0720 12/14/17  1321   INR  1 56* 1 43* 2 97*       Lipid Profile:   Lab Results   Component Value Date    CHOL 147 07/14/2017    CHOL 140 11/18/2016    CHOL 143 08/04/2016     Lab Results   Component Value Date    HDL 42 07/14/2017    HDL 52 11/18/2016    HDL 43 08/04/2016     Lab Results   Component Value Date    LDLCALC 63 07/14/2017    LDLCALC 50 11/18/2016    LDLCALC 64 08/04/2016     Lab Results   Component Value Date    TRIG 210 (H) 07/14/2017    TRIG 189 (H) 11/18/2016    TRIG 178 (H) 08/04/2016       Cardiac testing:   No results found for this or any previous visit  No results found for this or any previous visit  No results found for this or any previous visit  No procedure found  No results found for this or any previous visit        Meds/Allergies   all current active meds have been reviewed and current meds:   Current Facility-Administered Medications   Medication Dose Route Frequency    albuterol (PROVENTIL HFA,VENTOLIN HFA) inhaler 2 puff  2 puff Inhalation Q4H PRN    aspirin chewable tablet 81 mg  81 mg Oral Daily    atorvastatin (LIPITOR) tablet 80 mg  80 mg Oral Daily    cholecalciferol (VITAMIN D3) tablet 1,000 Units  1,000 Units Oral Daily    fluticasone (FLOVENT HFA) 220 mcg/act inhaler 2 puff  2 puff Inhalation BID    furosemide (LASIX) injection 40 mg  40 mg Intravenous BID (diuretic)    insulin lispro (HumaLOG) 100 units/mL subcutaneous injection 1-5 Units  1-5 Units Subcutaneous HS    insulin lispro (HumaLOG) 100 units/mL subcutaneous injection 1-6 Units  1-6 Units Subcutaneous TID AC    labetalol (NORMODYNE) injection 10 mg  10 mg Intravenous Q6H PRN    levothyroxine tablet 100 mcg  100 mcg Oral Early Morning    lisinopril (ZESTRIL) tablet 5 mg  5 mg Oral HS    magnesium oxide (MAG-OX) tablet 400 mg  400 mg Oral BID    metoprolol succinate (TOPROL-XL) 24 hr tablet 100 mg  100 mg Oral HS    metoprolol succinate (TOPROL-XL) 24 hr tablet 50 mg  50 mg Oral Daily With Breakfast    nitroglycerin (NITROSTAT) SL tablet 0 4 mg  0 4 mg Sublingual Q3 min PRN    ondansetron (ZOFRAN) injection 4 mg  4 mg Intravenous Q6H PRN    oxyCODONE-acetaminophen (PERCOCET) 5-325 mg per tablet 1 tablet  1 tablet Oral Q4H PRN    pantoprazole (PROTONIX) EC tablet 20 mg  20 mg Oral Early Morning    warfarin (COUMADIN) tablet 3 mg  3 mg Oral Once per day on Sun Mon Wed Thu Fri Sat    warfarin (COUMADIN) tablet 6 mg  6 mg Oral Once per day on Tue     Prescriptions Prior to Admission   Medication    aspirin 81 MG tablet    atorvastatin (LIPITOR) 80 mg tablet    cholecalciferol (VITAMIN D3) 1,000 units tablet    esomeprazole (NexIUM) 20 mg capsule    furosemide (LASIX) 40 mg tablet    levothyroxine 100 mcg tablet    lisinopril (ZESTRIL) 5 mg tablet    Magnesium Oxide 400 MG CAPS    metoprolol succinate (TOPROL-XL) 100 mg 24 hr tablet    metoprolol succinate (TOPROL-XL) 50 mg 24 hr tablet    mometasone (ASMANEX 14 METERED DOSES) 220 MCG/INH inhaler    nitroglycerin (NITROSTAT) 0 4 mg SL tablet    warfarin (COUMADIN) 3 mg tablet    albuterol (VENTOLIN HFA) 90 mcg/act inhaler    metFORMIN (GLUCOPHAGE) 1000 MG tablet     Assessment/ Plan    1   Acute on chronic diastolic heart failure in the setting of accelerated hypertension, missed diuretic dose due to Angiogram with IVF post procedure and history of severe aortic stenosis  Patient received furosemide 40 mg IV x 2 dose yesterday   I/O -1 3 L   She appears euvolemic now  Can change to PO diuretics  Patient on furosemide 40 mg PO daily at home  Can wean O2 as tolerated      2  NSTEMI likely type II- secondary to #1, afib with RVR and LEONARD   Troponin peaked to 2 88  No further complaint of chest pain   ECG showed afib with LBBB  LBBB not new  Continue aspirin and statin      3  Atrial fibrillation with RVR- patient has history of atrial flutter  She is now in NSR   Continue Metoprolol XL 50 mg PO Q AM and 100 mg PO Q PM   Patient on coumadin for [address]  INR 1 56     4  CAD- s/p CABG LIMA to LAD, SVG to D1, SVG to OM and SVG to PDA in 2004  Continue home medications: aspirin and atorvastatin 80 mg PO daily     5  PAD with non healing L great toe wound- followed by vascular surgery  Patient had diagnostic agram yesterday with no intervention showing severe tibial disease  On aspirin and statin at home      6  Severe aortic stenosis- last echocardiogram 3/24/17 showed EF 60%, moderate to severe concentric hypertrophy, severe aortic stenosis, severe mitral calcification with at least moderate mitral stenosis   Patient follows with Dr Mino Natarajan and was referred to Dr Delfin Noriega for TAVR workup  CTA showed no porcelain aorta with small caliber but patent aorta and iliac arteries  She was found to have rather small femoral vessels and borderline gradients therefor TAVR was not pursued    7  LEONARD- resolved  Creatinine 0 96 today from 1 44     Counseling / Coordination of Care  Total floor / unit time spent today 25 minutes  Greater than 50% of total time was spent with the patient and / or family counseling and / or coordination of care  ** Please Note: Dragon 360 Dictation voice to text software may have been used in the creation of this document   **

## 2017-12-20 NOTE — PLAN OF CARE
Problem: Potential for Falls  Goal: Patient will remain free of falls  INTERVENTIONS:  - Assess patient frequently for physical needs  -  Identify cognitive and physical deficits and behaviors that affect risk of falls  -  [place] fall precautions as indicated by assessment   - Educate patient/family on patient safety including physical limitations  - Instruct patient to call for assistance with activity based on assessment  - Modify environment to reduce risk of injury  - Consider OT/PT consult to assist with strengthening/mobility   Outcome: Progressing      Problem: CARDIOVASCULAR - ADULT  Goal: Maintains optimal cardiac output and hemodynamic stability  INTERVENTIONS:  - Monitor I/O, vital signs and rhythm  - Monitor for S/S and trends of decreased cardiac output i e  bleeding, hypotension  - Administer and titrate ordered vasoactive medications to optimize hemodynamic stability  - Assess quality of pulses, skin color and temperature  - Assess for signs of decreased coronary artery perfusion - ex   Angina  - Instruct patient to report change in severity of symptoms   Outcome: Progressing    Goal: Absence of cardiac dysrhythmias or at baseline rhythm  INTERVENTIONS:  - Continuous cardiac monitoring, monitor vital signs, obtain 12 lead EKG if indicated  - Administer antiarrhythmic and heart rate control medications as ordered  - Monitor electrolytes and administer replacement therapy as ordered   Outcome: Progressing      Problem: METABOLIC, FLUID AND ELECTROLYTES - ADULT  Goal: Electrolytes maintained within normal limits  INTERVENTIONS:  - Monitor labs and assess patient for signs and symptoms of electrolyte imbalances  - Administer electrolyte replacement as ordered  - Monitor response to electrolyte replacements, including repeat lab results as appropriate  - Instruct patient on fluid and nutrition as appropriate   Outcome: Progressing    Goal: Fluid balance maintained  INTERVENTIONS:  - Monitor labs and assess for signs and symptoms of volume excess or deficit  - Monitor I/O and WT  - Instruct patient on fluid and nutrition as appropriate   Outcome: Progressing    Goal: Glucose maintained within target range  INTERVENTIONS:  - Monitor Blood Glucose as ordered  - Assess for signs and symptoms of hyperglycemia and hypoglycemia  - Administer ordered medications to maintain glucose within target range  - Assess nutritional intake and initiate nutrition service referral as needed   Outcome: Progressing      Problem: DISCHARGE PLANNING  Goal: Discharge to home or other facility with appropriate resources  INTERVENTIONS:  - Identify barriers to discharge w/patient and caregiver  - Arrange for needed discharge resources and transportation as appropriate  - Identify discharge learning needs (meds, wound care, etc )  - Arrange for interpretive services to assist at discharge as needed  - Refer to Case Management Department for coordinating discharge planning if the patient needs post-hospital services based on physician/advanced practitioner order or complex needs related to functional status, cognitive ability, or social support system  Outcome: Progressing      Problem: Knowledge Deficit  Goal: Patient/family/caregiver demonstrates understanding of disease process, treatment plan, medications, and discharge instructions  Complete learning assessment and assess knowledge base    Interventions:  - Provide teaching at level of understanding  - Provide teaching via preferred learning methods  Outcome: Progressing

## 2017-12-20 NOTE — RESTORATIVE TECHNICIAN NOTE
Restorative Specialist Mobility Note       Activity: Ambulate in [place]     Assistive Device: Front wheel walker     Ambulation Response:  Tolerated well  Repositioned: Turns self, Up in chair  Positioning Frequency: Able to turn self

## 2017-12-20 NOTE — PROGRESS NOTES
Progress Note - Vascular Surgery   Kp Shea 76 y o  female MRN: [id number]  Unit/Bed#: [facility] 529-01 Encounter: [id number]    Assessment:  76 F with PAD and left great toe wound, s/p diagnostic angiogram, rapid response called for flash pulmonary edema yesterday post-procedure    Plan:  Continue supportive care, currently comfortable on 2L Nc  Continue coumadin  No further vascular intervention at this time    Subjective/Objective     Subjective: Rapid response after procedure yesterday  Objective:    Blood pressure 128/60, pulse 77, temperature 98 °F (36 7 °C), resp  rate 20, height 5' 5" (1 651 m), weight 76 9 kg (169 lb 8 5 oz), SpO2 95 %, not currently breastfeeding  ,Body mass index is 28 21 kg/m²        Intake/Output Summary (Last 24 hours) at 12/20/17 0657  Last data filed at 12/20/17 0094   Gross per 24 hour   Intake          1300 83 ml   Output             2450 ml   Net         -1149 17 ml       Invasive Devices     Peripheral Intravenous Line            Peripheral IV 12/19/17 Left Arm less than 1 day    Peripheral IV 12/19/17 Right Arm less than 1 day                Physical Exam:   General: NAD, AAOx3  CV: RRR +S1/S2  Chest: breath sounds bilaterally  Abdomen: soft, NT ND  Extremities: dopp DP/PT        Results from last 7 days  Lab Units 12/20/17  0537 12/19/17  1424 12/19/17  1416 12/14/17  1321   WBC Thousand/uL 12 27* 13 99*  --  10 91*   HEMOGLOBIN g/dL 11 2* 12 9  --  11 6   I STAT HEMOGLOBIN g/dl  --   --  15 0  --    HEMATOCRIT % 35 1 41 0  --  37 6   PLATELETS Thousands/uL 338 440*  --  315       Results from last 7 days  Lab Units 12/20/17  0537 12/19/17  1424  12/14/17  1321   SODIUM mmol/L 138 135*  --  140   POTASSIUM mmol/L 3 9 4 0  --  4 2   CHLORIDE mmol/L 101 101  --  102   CO2 mmol/L 28 19*  --  27   BUN mg/dL 30* 25  --  25   CREATININE mg/dL 0 96 1 44*  --  0 87   GLUCOSE RANDOM mg/dL 167* 483*  --  137   GLUCOSE, ISTAT   --   --   < >  --    CALCIUM mg/dL 7 4* 7 6*  --  8 0*   < > = values in this interval not displayed      Results from last 7 days  Lab Units 12/20/17  0537 12/19/17  0720 12/14/17  1321   INR  1 56* 1 43* 2 97*

## 2017-12-20 NOTE — PROGRESS NOTES
Spoke with RN who brought to my attention elevation in trop from 0 43 to 1 34  States pt has no SOB, cp  Spoke with PA for cardiology and discussed possibly starting heparin  Cards PA states she is going to assess pt

## 2017-12-20 NOTE — PROGRESS NOTES
IM Residency Progress Note   Unit/Bed#: [facility] 529-01 Encounter: [id number]  SOD Team C       Lavinia Ken 76 y o  female [id number]    Hospital Stay Days: 1      Assessment/Plan:    Principal Problem:    Acute pulmonary edema ([facility] 75 )  Active Problems:    PAD (peripheral artery disease) ([facility])    HTN (hypertension)    NSTEMI (non-ST elevated myocardial infarction) ([facility])    Severe aortic stenosis    CAD (coronary artery disease)    DM (diabetes mellitus) type II uncontrolled, periph vascular disorder ([facility])    Atrial flutter ([facility])    Hyperlipidemia    GERD (gastroesophageal reflux disease)    Moderate mitral stenosis    LEONARD (acute kidney injury) ([facility] 75 )    Flash pulmonary edema   -improving symptomatically  - likely multifactorial secondary to severe aortic stenosis, accelerated hypertension in the setting of receiving IV fluids and medication noncompliance  -will continue with IV Lasix and BiPAP as needed  - continue with albuterol inhaler p r n      Chest pressure likely secondary to NSTEMI 2 - demand ischemia  -troponin trend: Peaked at 2 88 and now down trending 0 02> 0 43> 1 34> 2 88= 2 85>2 38, her EKG yesterday showed new atrial fibrillation with left axis deviation and left bundle branch block  Her telemetry has been uneventful   - cardiology is on board and we appreciate  their recommendations   - her last echo done on 03/24/2017 at [facility] showed moderate to severe concentric LVH, severe aortic stenosis, severe mitral annular calcification, tricuspid regurgitation and diastolic dysfunction with elevated filling pressures with an EF of 60%  A  Fib with RVR with hx of atrial flutter  -resolved, patient is currently in normal sinus rhythm  - continue with metoprolol and Coumadin  - INR this a m  is 1 56, will recheck tomorrow      CAD s/p CABG in 2004  -continue with aspirin, atorvastatin, metoprolol and lisinopril     PAD  -with left great toe nonhealing wound , status post arteriogram which showed severe 3 vessel tibial disease yesterday without intervention  -continue with aspirin, atorvastatin, metoprolol     Severe AS  -cardiology following  -ECHO 3/2017 showed AS along with moderate MS and mild TR  -sees Dr Derl Boxer outpatient, was not deemed a candidate for TAVR per Dr Kim De Jesus due to small femoral vessels on borderline gradients     Chronic Diastolic CHF  -continue with Lasix and metoprolol     DM II  -hemoglobin A1c was 7 7 yesterday and blood glucose in the past 24 hours has ranged from 165-483, and below 200 in the past 6 hours  - will continue with insulin sliding scale, diabetic diet and Accu-Cheks     LEONARD, POA  -resolved, creatinine this morning is 0 96  - possibly secondary to contrast used during arteriogram     HTN  -stable  - continue with metoprolol and lisinopril  Hypothyroidism  - stable  - continue with levothyroxine     GERD  -stable  - continue with pantoprazole     HL  -Lipids  normal except for TG of 210  -continue with atorvastatin    Disposition: We will continue with medical management and continue to trend troponin till it starts down trending  We will follow with Cardiology recommendations  Subjective:   Patient seen and examined  She says are chest pain is completely resolved and her shortness of breath is much improved compared to yesterday  She denies fever, chills, night sweats, palpitations, nausea, vomiting, diaphoresis, abdominal pain, diarrhea  but admits to constipation  Per nursing staff, she had an uneventful night         Vitals: Temp (24hrs), Av 2 °F (36 8 °C), Min:98 °F (36 7 °C), Max:98 5 °F (36 9 °C)  Current: Temperature: 98 2 °F (36 8 °C)  Vitals:    17 2310 17 0352 17 0522 17 0726   BP: 116/63 128/60  125/71   Pulse: 85 77  80   Resp:    Temp: 98 4 °F (36 9 °C) 98 °F (36 7 °C)  98 2 °F (36 8 °C)   TempSrc: Oral      SpO2: 95% 95%  98%   Weight:   76 9 kg (169 lb 8 5 oz)    Height:        Body mass index is 28 21 kg/m²  I/O last 24 hours: In: 1300 8 [P O :400; I V :900 8]  Out: 2450 [Urine:2450]      Physical Exam:     GENERAL:  In no obvious distress, sitting comfortably in her chair eating breakfast, afebrile to touch  HEENT:  Normocephalic, atraumatic  Pupils equal round and reactive to light and accommodation  Extraocular muscles intact bilaterally  Not pale, anicteric  No oropharyngeal erythema  NECK:  Supple, no lymphadenopathy  CVS:  S1, S2   Regular rate and rhythm  3/6 systolic murmur maximal in aortic valve region , no rubs or gallops  RESPIRATORY:  On 2 L of oxygen by nasal cannula, coarse rales in the mid and lower lung zones bilaterally, no wheezes or rhonchi  ABDOMEN:  Full, soft, nontender nondistended  No masses or organomegaly  Normoactive bowel sounds  MSK:  No pedal edema  NEURO:  Awake, alert and oriented x3        Invasive Devices     Peripheral Intravenous Line            Peripheral IV 12/19/17 Left Arm less than 1 day    Peripheral IV 12/19/17 Right Arm less than 1 day                          Labs:   Recent Results (from the past 24 hour(s))   ECG 12 lead    Collection Time: 12/19/17  2:00 PM   Result Value Ref Range    Ventricular Rate 147 BPM    Atrial Rate 147 BPM    KS Interval 48 ms    QRSD Interval 150 ms    QT Interval 386 ms    QTC Interval 604 ms    P Axis  degrees    QRS Axis 237 degrees    T Wave Axis 70 degrees   POCT Blood Gas (CG8+)    Collection Time: 12/19/17  2:16 PM   Result Value Ref Range    ph, Dusty ISTAT 7 223 (LL) 7 300 - 7 400    pCO2, Dusty i-STAT 55 1 (H) 42 0 - 50 0 mm HG    pO2, Dusty i-STAT 45 0 35 0 - 45 0 mm HG    BE, i-STAT -5 (L) -2 - 3 mmol/L    HCO3, Dusty i-STAT 22 7 (L) 24 0 - 30 0 mmol/L    CO2, i-STAT 24 21 - 32 mmol/L    O2 Sat, i-STAT 71 (L) 95 - 98 %    SODIUM, I-STAT 134 (L) 136 - 145 mmol/l    Potassium, i-STAT 8 7 (HH) 3 5 - 5 3 mmol/L    Calcium, Ionized i-STAT 0 92 (L) 1 12 - 1 32 mmol/L    Hct, i-STAT 44 34 8 - 46 1 %    Hgb, i-STAT 15 0 11 5 - 15 4 g/dl    Glucose, i-STAT 456 (H) 65 - 140 mg/dl    Specimen Type VENOUS    Troponin I    Collection Time: 12/19/17  2:24 PM   Result Value Ref Range    Troponin I 0 02 <=0 04 ng/mL   CBC and differential    Collection Time: 12/19/17  2:24 PM   Result Value Ref Range    WBC 13 99 (H) 4 31 - 10 16 Thousand/uL    RBC 5 61 (H) 3 81 - 5 12 Million/uL    Hemoglobin 12 9 11 5 - 15 4 g/dL    Hematocrit 41 0 34 8 - 46 1 %    MCV 73 (L) 82 - 98 fL    MCH 23 0 (L) 26 8 - 34 3 pg    MCHC 31 5 31 4 - 37 4 g/dL    RDW 17 2 (H) 11 6 - 15 1 %    MPV 10 8 8 9 - 12 7 fL    Platelets 825 (H) 736 - 390 Thousands/uL    nRBC 0 /100 WBCs    Neutrophils Relative 73 43 - 75 %    Lymphocytes Relative 24 14 - 44 %    Monocytes Relative 3 (L) 4 - 12 %    Eosinophils Relative 0 0 - 6 %    Basophils Relative 0 0 - 1 %    Neutrophils Absolute 10 17 (H) 1 85 - 7 62 Thousands/µL    Lymphocytes Absolute 3 38 0 60 - 4 47 Thousands/µL    Monocytes Absolute 0 36 0 17 - 1 22 Thousand/µL    Eosinophils Absolute 0 01 0 00 - 0 61 Thousand/µL    Basophils Absolute 0 01 0 00 - 0 10 Thousands/µL   Basic metabolic panel    Collection Time: 12/19/17  2:24 PM   Result Value Ref Range    Sodium 135 (L) 136 - 145 mmol/L    Potassium 4 0 3 5 - 5 3 mmol/L    Chloride 101 100 - 108 mmol/L    CO2 19 (L) 21 - 32 mmol/L    Anion Gap 15 (H) 4 - 13 mmol/L    BUN 25 5 - 25 mg/dL    Creatinine 1 44 (H) 0 60 - 1 30 mg/dL    Glucose 483 (H) 65 - 140 mg/dL    Calcium 7 6 (L) 8 3 - 10 1 mg/dL    eGFR 35 ml/min/1 73sq m   TSH, 3rd generation    Collection Time: 12/19/17  2:24 PM   Result Value Ref Range    TSH 3RD GENERATON 0 763 0 358 - 3 740 uIU/mL   Magnesium    Collection Time: 12/19/17  2:24 PM   Result Value Ref Range    Magnesium 1 1 (L) 1 6 - 2 6 mg/dL   Phosphorus    Collection Time: 12/19/17  2:24 PM   Result Value Ref Range    Phosphorus 5 2 (H) 2 3 - 4 1 mg/dL   ECG 12 lead    Collection Time: 12/19/17  2:26 PM   Result Value Ref Range    Ventricular Rate 126 BPM    Atrial Rate 125 BPM    MT Interval  ms    QRSD Interval 144 ms    QT Interval 398 ms    QTC Interval 576 ms    P Axis  degrees    QRS Axis -42 degrees    T Wave Axis 115 degrees   Fingerstick Glucose (POCT)    Collection Time: 12/19/17  4:37 PM   Result Value Ref Range    POC Glucose 412 (H) 65 - 140 mg/dl   Hemoglobin A1c    Collection Time: 12/19/17  4:49 PM   Result Value Ref Range    Hemoglobin A1C 7 7 (H) 4 2 - 6 3 %     mg/dl   Troponin I    Collection Time: 12/19/17  4:55 PM   Result Value Ref Range    Troponin I 0 43 (H) <=0 04 ng/mL   Acetone    Collection Time: 12/19/17  4:55 PM   Result Value Ref Range    Acetone, Bld Negative Negative   UA w Reflex to Microscopic w Reflex to Culture    Collection Time: 12/19/17  5:58 PM   Result Value Ref Range    Color, UA Yellow     Clarity, UA Clear     Specific Gravity, UA 1 020 1 003 - 1 030    pH, UA 5 5 4 5 - 8 0    Leukocytes, UA (A) Negative     Elevated glucose may cause decreased leukocyte values   See urine microscopic for [facility] result/    Nitrite, UA Negative Negative    Protein, UA Negative Negative mg/dl    Glucose, UA >=1000 (1%) (A) Negative mg/dl    Ketones, UA Negative Negative mg/dl    Urobilinogen, UA 0 2 0 2, 1 0 E U /dl E U /dl    Bilirubin, UA Negative Negative    Blood, UA Small (A) Negative   Urine Microscopic    Collection Time: 12/19/17  5:58 PM   Result Value Ref Range    RBC, UA None Seen None Seen, 0-5 /hpf    WBC, UA None Seen None Seen, 0-5, 5-55, 5-65 /hpf    Epithelial Cells None Seen None Seen, Occasional /hpf    Bacteria, UA None Seen None Seen, Occasional /hpf    Hyaline Casts, UA None Seen None Seen /lpf   Fingerstick Glucose (POCT)    Collection Time: 12/19/17  9:14 PM   Result Value Ref Range    POC Glucose 315 (H) 65 - 140 mg/dl   Troponin I    Collection Time: 12/19/17  9:41 PM   Result Value Ref Range    Troponin I 1 34 (H) <=0 04 ng/mL   Troponin I    Collection Time: 12/20/17  5:37 AM   Result Value Ref Range Troponin I 2 85 (H) <=0 04 ng/mL   Protime-INR    Collection Time: 12/20/17  5:37 AM   Result Value Ref Range    Protime 18 8 (H) 12 1 - 14 4 seconds    INR 1 56 (H) 0 86 - 1 16   Comprehensive metabolic panel    Collection Time: 12/20/17  5:37 AM   Result Value Ref Range    Sodium 138 136 - 145 mmol/L    Potassium 3 9 3 5 - 5 3 mmol/L    Chloride 101 100 - 108 mmol/L    CO2 28 21 - 32 mmol/L    Anion Gap 9 4 - 13 mmol/L    BUN 30 (H) 5 - 25 mg/dL    Creatinine 0 96 0 60 - 1 30 mg/dL    Glucose 167 (H) 65 - 140 mg/dL    Calcium 7 4 (L) 8 3 - 10 1 mg/dL    AST 27 5 - 45 U/L    ALT 25 12 - 78 U/L    Alkaline Phosphatase 75 46 - 116 U/L    Total Protein 7 2 6 4 - 8 2 g/dL    Albumin 3 1 (L) 3 5 - 5 0 g/dL    Total Bilirubin 0 73 0 20 - 1 00 mg/dL    eGFR 56 ml/min/1 73sq m   CBC (With Platelets)    Collection Time: 12/20/17  5:37 AM   Result Value Ref Range    WBC 12 27 (H) 4 31 - 10 16 Thousand/uL    RBC 4 85 3 81 - 5 12 Million/uL    Hemoglobin 11 2 (L) 11 5 - 15 4 g/dL    Hematocrit 35 1 34 8 - 46 1 %    MCV 72 (L) 82 - 98 fL    MCH 23 1 (L) 26 8 - 34 3 pg    MCHC 31 9 31 4 - 37 4 g/dL    RDW 17 3 (H) 11 6 - 15 1 %    Platelets 358 249 - 936 Thousands/uL    MPV 10 9 8 9 - 12 7 fL   Troponin I    Collection Time: 12/20/17  5:37 AM   Result Value Ref Range    Troponin I 2 88 (H) <=0 04 ng/mL   Fingerstick Glucose (POCT)    Collection Time: 12/20/17  7:10 AM   Result Value Ref Range    POC Glucose 165 (H) 65 - 140 mg/dl       Radiology Results: I have personally reviewed pertinent reports  Other Diagnostic Testing:   I have personally reviewed pertinent reports          Active Meds:   Current Facility-Administered Medications   Medication Dose Route Frequency    albuterol (PROVENTIL HFA,VENTOLIN HFA) inhaler 2 puff  2 puff Inhalation Q4H PRN    aspirin chewable tablet 81 mg  81 mg Oral Daily    atorvastatin (LIPITOR) tablet 80 mg  80 mg Oral Daily    cholecalciferol (VITAMIN D3) tablet 1,000 Units  1,000 Units Oral Daily    fluticasone (FLOVENT HFA) 220 mcg/act inhaler 2 puff  2 puff Inhalation BID    furosemide (LASIX) tablet 40 mg  40 mg Oral Daily    insulin lispro (HumaLOG) 100 units/mL subcutaneous injection 1-5 Units  1-5 Units Subcutaneous HS    insulin lispro (HumaLOG) 100 units/mL subcutaneous injection 1-6 Units  1-6 Units Subcutaneous TID AC    labetalol (NORMODYNE) injection 10 mg  10 mg Intravenous Q6H PRN    levothyroxine tablet 100 mcg  100 mcg Oral Early Morning    lisinopril (ZESTRIL) tablet 5 mg  5 mg Oral HS    magnesium oxide (MAG-OX) tablet 400 mg  400 mg Oral BID    metoprolol succinate (TOPROL-XL) 24 hr tablet 100 mg  100 mg Oral HS    metoprolol succinate (TOPROL-XL) 24 hr tablet 50 mg  50 mg Oral Daily With Breakfast    nitroglycerin (NITROSTAT) SL tablet 0 4 mg  0 4 mg Sublingual Q3 min PRN    ondansetron (ZOFRAN) injection 4 mg  4 mg Intravenous Q6H PRN    oxyCODONE-acetaminophen (PERCOCET) 5-325 mg per tablet 1 tablet  1 tablet Oral Q4H PRN    pantoprazole (PROTONIX) EC tablet 20 mg  20 mg Oral Early Morning    warfarin (COUMADIN) tablet 3 mg  3 mg Oral Once per day on Sun Mon Wed Thu Fri Sat    warfarin (COUMADIN) tablet 6 mg  6 mg Oral Once per day on Tue         VTE Pharmacologic Prophylaxis: Warfarin (Coumadin)  VTE Mechanical Prophylaxis: sequential compression device    Lina Ellington DO

## 2017-12-21 VITALS
HEART RATE: 82 BPM | BODY MASS INDEX: 27.89 KG/M2 | DIASTOLIC BLOOD PRESSURE: 50 MMHG | WEIGHT: 167.4 LBS | OXYGEN SATURATION: 95 % | HEIGHT: 65 IN | RESPIRATION RATE: 18 BRPM | TEMPERATURE: 98.6 F | SYSTOLIC BLOOD PRESSURE: 92 MMHG

## 2017-12-21 LAB
ANION GAP SERPL CALCULATED.3IONS-SCNC: 6 MMOL/L (ref 4–13)
BUN SERPL-MCNC: 33 MG/DL (ref 5–25)
CALCIUM SERPL-MCNC: 7.3 MG/DL (ref 8.3–10.1)
CHLORIDE SERPL-SCNC: 104 MMOL/L (ref 100–108)
CO2 SERPL-SCNC: 31 MMOL/L (ref 21–32)
CREAT SERPL-MCNC: 0.95 MG/DL (ref 0.6–1.3)
ERYTHROCYTE [DISTWIDTH] IN BLOOD BY AUTOMATED COUNT: 17.2 % (ref 11.6–15.1)
GFR SERPL CREATININE-BSD FRML MDRD: 57 ML/MIN/1.73SQ M
GLUCOSE SERPL-MCNC: 171 MG/DL (ref 65–140)
GLUCOSE SERPL-MCNC: 174 MG/DL (ref 65–140)
GLUCOSE SERPL-MCNC: 278 MG/DL (ref 65–140)
HCT VFR BLD AUTO: 34.1 % (ref 34.8–46.1)
HGB BLD-MCNC: 10.7 G/DL (ref 11.5–15.4)
INR PPP: 1.81 (ref 0.86–1.16)
MCH RBC QN AUTO: 22.7 PG (ref 26.8–34.3)
MCHC RBC AUTO-ENTMCNC: 31.4 G/DL (ref 31.4–37.4)
MCV RBC AUTO: 72 FL (ref 82–98)
PLATELET # BLD AUTO: 316 THOUSANDS/UL (ref 149–390)
PMV BLD AUTO: 10.3 FL (ref 8.9–12.7)
POTASSIUM SERPL-SCNC: 4.3 MMOL/L (ref 3.5–5.3)
PROTHROMBIN TIME: 21.1 SECONDS (ref 12.1–14.4)
RBC # BLD AUTO: 4.71 MILLION/UL (ref 3.81–5.12)
SODIUM SERPL-SCNC: 141 MMOL/L (ref 136–145)
WBC # BLD AUTO: 10.17 THOUSAND/UL (ref 4.31–10.16)

## 2017-12-21 PROCEDURE — 85610 PROTHROMBIN TIME: CPT | Performed by: INTERNAL MEDICINE

## 2017-12-21 PROCEDURE — 82948 REAGENT STRIP/BLOOD GLUCOSE: CPT

## 2017-12-21 PROCEDURE — 80048 BASIC METABOLIC PNL TOTAL CA: CPT | Performed by: INTERNAL MEDICINE

## 2017-12-21 PROCEDURE — 85027 COMPLETE CBC AUTOMATED: CPT | Performed by: INTERNAL MEDICINE

## 2017-12-21 RX ORDER — FUROSEMIDE 40 MG/1
40 TABLET ORAL DAILY
Status: DISCONTINUED | OUTPATIENT
Start: 2017-12-22 | End: 2017-12-21 | Stop reason: HOSPADM

## 2017-12-21 RX ADMIN — INSULIN LISPRO 1 UNITS: 100 INJECTION, SOLUTION INTRAVENOUS; SUBCUTANEOUS at 08:26

## 2017-12-21 RX ADMIN — ALBUTEROL SULFATE 2 PUFF: 90 AEROSOL, METERED RESPIRATORY (INHALATION) at 08:38

## 2017-12-21 RX ADMIN — FUROSEMIDE 40 MG: 10 INJECTION, SOLUTION INTRAMUSCULAR; INTRAVENOUS at 08:27

## 2017-12-21 RX ADMIN — VITAMIN D, TAB 1000IU (100/BT) 1000 UNITS: 25 TAB at 08:26

## 2017-12-21 RX ADMIN — ASPIRIN 81 MG 81 MG: 81 TABLET ORAL at 08:26

## 2017-12-21 RX ADMIN — PANTOPRAZOLE SODIUM 20 MG: 20 TABLET, DELAYED RELEASE ORAL at 05:11

## 2017-12-21 RX ADMIN — LEVOTHYROXINE SODIUM 100 MCG: 100 TABLET ORAL at 05:11

## 2017-12-21 RX ADMIN — INSULIN LISPRO 4 UNITS: 100 INJECTION, SOLUTION INTRAVENOUS; SUBCUTANEOUS at 12:16

## 2017-12-21 RX ADMIN — ATORVASTATIN CALCIUM 80 MG: 80 TABLET, FILM COATED ORAL at 08:26

## 2017-12-21 RX ADMIN — FLUTICASONE PROPIONATE 2 PUFF: 220 AEROSOL, METERED RESPIRATORY (INHALATION) at 08:26

## 2017-12-21 RX ADMIN — Medication 400 MG: at 08:26

## 2017-12-21 RX ADMIN — METOPROLOL SUCCINATE 50 MG: 50 TABLET, EXTENDED RELEASE ORAL at 08:26

## 2017-12-21 NOTE — DISCHARGE SUMMARY
[facility] CENTRAL Discharge Summary - Medical Adam Woodson 76 y o  female MRN: [id number]    [address] [facility] Bed: [facility] 529/[facility] 529-01 Encounter: [id number]    BRIEF OVERVIEW    Admitting Provider: Florin Rashid MD  Discharge Provider: Florin Rashid MD  Primary Care Physician at Discharge: Florin Rashid MD    Discharge To: Home     Admission Date: 12/19/2017     Discharge Date:   December 21st, 2017     Primary Discharge Diagnosis  Principal Problem:    Acute pulmonary edema ([facility] [address] )  Active Problems:    PAD (peripheral artery disease) ([facility])    HTN (hypertension)    NSTEMI (non-ST elevated myocardial infarction) ([facility])    Severe aortic stenosis    CAD (coronary artery disease)    DM (diabetes mellitus) type II uncontrolled, periph vascular disorder ([facility])    Atrial flutter ([facility])    Hyperlipidemia    GERD (gastroesophageal reflux disease)    Moderate mitral stenosis    LEONARD (acute kidney injury) ([facility] [address] )  Resolved Problems:    * No resolved hospital problems   *      Other Problems Addressed:  None    Consulting Providers   Cardiology -Kacie Ferro MD    Therapeutic Operative Procedures Performed  None    Diagnostic Procedures Performed   Chest x-ray - findings compatible with pulmonary edema/CHF    Discharge Disposition:  Home    Discharged With Lines: no       Test Results Pending at Discharge:   No    Outpatient Follow-Up   Primary care physician    Follow up with consulting providers   Cardiology, vascular surgery    Active Issues Requiring Follow-up    Diastolic heart failure, hypertension, diabetes, peripheral arterial disease    Code Status: Level 1 - Full Code    Medications   Current Discharge Medication List      CONTINUE these medications which have NOT CHANGED    Details   aspirin 81 MG tablet Take 81 mg by mouth daily      atorvastatin (LIPITOR) 80 mg tablet Take 80 mg by mouth daily      cholecalciferol (VITAMIN D3) 1,000 units tablet Take 1,000 mg by mouth daily      esomeprazole (NexIUM) 20 mg capsule 20 mg daily      furosemide (LASIX) 40 mg tablet Take 40 mg by mouth daily      levothyroxine 100 mcg tablet Take 100 mcg by mouth daily      lisinopril (ZESTRIL) 5 mg tablet Take 5 mg by mouth daily at bedtime      Magnesium Oxide 400 MG CAPS Take 400 mg by mouth 2 (two) times a day      !! metoprolol succinate (TOPROL-XL) 100 mg 24 hr tablet 100 mg daily at bedtime      !! metoprolol succinate (TOPROL-XL) 50 mg 24 hr tablet Take 1 tablet by mouth daily with breakfast      mometasone (ASMANEX 14 METERED DOSES) 220 MCG/INH inhaler Inhale 220 mcg daily at bedtime 1 puff in evening      nitroglycerin (NITROSTAT) 0 4 mg SL tablet Place 0 4 mg under the tongue every 3 (three) minutes as needed      warfarin (COUMADIN) 3 mg tablet Take 3 mg by mouth daily 6 mg tuesdays, 3 mg all other days      albuterol (VENTOLIN HFA) 90 mcg/act inhaler Inhale 108 mcg 2 (two) times a day as needed 2 puffs bid PRN      metFORMIN (GLUCOPHAGE) 1000 MG tablet 1,000 mg 2 (two) times a day       !! - Potential duplicate medications found  Please discuss with provider  Allergies  Allergies   Allergen Reactions    Other Chest Pain     IVP-listed as "chest pain" in previous chart, patient stated this occurred "a long time ago" but does not remember exactly occurred     Cephalosporins     Doxycycline     Levaquin [Levofloxacin]     Toradol [Ketorolac Tromethamine]      Discharge Diet: cardiac diet, diabetic diet and 2 g sodium  Activity restrictions: none    Alexa Lujan is a 76year old female with multiple medical co-morbidities including hypertension, severe aortic stenosis, diabetes, atrial flutter and hyperlipidemia who was admitted on 12/19/17 with acute pulmonary edema after she had an elective arteriogram for peripheral arterial disease with vascular surgery    Acute pulmonary edema was thought to be secondary to a combination of her severe aortic stenosis, medication noncompliance and IV fluids  Patient also had an NSTEMI type 2 with her troponins peaking at 2 88 and was found to be in atrial fibrillation with rapid ventricular response  Cardiology was consulted and patient was started on IV Lasix with BiPAP  She was continued on home metoprolol and Coumadin was restarted  She has improved and she is satting at 94% on room air and her lung exam has improved  Her IV Lasix will be transitioned back to p  o  Lasix and she will be discharged home on her home regimen  Also of note, pt had an acute kidney injury on admission with creatinine rising to 1 44 but this has resolved also  She will follow up with her primary care physician within in 1 week of discharge  Of note, patient mentioned that she has been having foreign body sensation in her throat as well as hoarseness since she was intubated last year  She will be referred to see an ENT surgeon by her primary care physician  She will also follow up with her vascular surgeon as an outpatient  She will establish care with her cardiologist   Her cardiologist's office will  make arrangements for that(she used to follow with a cardiologist at [facility] but wants to switch to [facility])  Her INR was subtherapeutic on admission because her coumadin had been held for the arteriogram  INR today is near therapeutic at 1 81  We will give her a lab script to recheck her INR in 5 days-12/26/17  Her condition on discharge is stable      Presenting Problem/History of Present Illness  Principal Problem:    Acute pulmonary edema (HCC)  Active Problems:    PAD (peripheral artery disease) (HCC)    HTN (hypertension)    NSTEMI (non-ST elevated myocardial infarction) (HCC)    Severe aortic stenosis    CAD (coronary artery disease)    DM (diabetes mellitus) type II uncontrolled, periph vascular disorder (HCC)    Atrial flutter (HCC)    Hyperlipidemia    GERD (gastroesophageal reflux disease)    Moderate mitral stenosis    LEONARD (acute kidney injury) ([facility] [address] )  Resolved Problems:    * No resolved hospital problems  *    Flash pulmonary edema   -improving   - likely multifactorial secondary to severe aortic stenosis, accelerated hypertension in the setting of receiving IV fluids and medication noncompliance  - Continue oral lasix and prn albuterol     Chest pressure likely secondary to NSTEMI 2 - demand ischemia  - resolved  Troponin peaked at 2 88 and started down trending   - her last echo done on 03/24/2017 at [facility] showed moderate to severe concentric LVH, severe aortic stenosis, severe mitral annular calcification, tricuspid regurgitation and diastolic dysfunction with elevated filling pressures with an EF of 60%      A   Fib with RVR with hx of atrial flutter  -resolved, patient is currently in normal sinus rhythm  - continue with metoprolol and Coumadin  - INR this a m  is 1 81, will recheck as an outpatient      CAD s/p CABG in 2004  -continue with aspirin, atorvastatin, metoprolol and lisinopril     PAD  -with left great toe nonhealing wound , status post arteriogram which showed severe 3 vessel tibial disease yesterday without intervention  -continue with aspirin, atorvastatin, metoprolol     Severe AS  -cardiology following  -ECHO 3/2017 showed AS along with moderate MS and mild TR  -sees Dr Tri Pavon outpatient, was not deemed a candidate for TAVR per Dr Gee Filler to small femoral vessels on borderline gradients     Chronic Diastolic CHF  -continue with Lasix and metoprolol     DM II  -hemoglobin A1c was 7 7 on 12/19/17 and blood glucose in the past 24 hours has ranged from 165-242  - continue with metformin     LEONARD, POA  -resolved, creatinine this morning is 0 95  - possibly secondary to contrast used during arteriogram     HTN  -stable  - continue with metoprolol and lisinopril      Hypothyroidism  - stable  - continue with levothyroxine     GERD  -stable  - continue with pantoprazole     HL  -Lipids 7/17 normal except for TG of 210  -continue with atorvastatin         Discharge Condition: stable      Discharge  Statement   I spent 30 minutes minutes discharging the patient  This time was spent on the day of discharge  I had direct contact with the patient on the day of discharge  Additional documentation is required if more than 30 minutes were spent on discharge

## 2017-12-21 NOTE — PROGRESS NOTES
IM Residency Progress Note   Unit/Bed#: [facility] 529-01 Encounter: [id number]  SOD Team C       Isamar العلي 76 y o  female [id number]    Hospital Stay Days: 2      Assessment/Plan:    Principal Problem:    Acute pulmonary edema ([facility] )  Active Problems:    PAD (peripheral artery disease) ([facility])    HTN (hypertension)    NSTEMI (non-ST elevated myocardial infarction) ([facility])    Severe aortic stenosis    CAD (coronary artery disease)    DM (diabetes mellitus) type II uncontrolled, periph vascular disorder ([facility])    Atrial flutter ([facility])    Hyperlipidemia    GERD (gastroesophageal reflux disease)    Moderate mitral stenosis    LEONARD (acute kidney injury) ([facility] [address] )    Flash pulmonary edema   -improving   - likely multifactorial secondary to severe aortic stenosis, accelerated hypertension in the setting of receiving IV fluids and medication noncompliance  -  We will likely transition IV Lasix to p o  Lasix today  - continue with albuterol inhaler p r n       Chest pressure likely secondary to NSTEMI 2 - demand ischemia  - resolved  Troponin peaked at 2 88 and started down trending  - cardiology is on board and we appreciate  their recommendations   - her last echo done on 03/24/2017 at [facility] showed moderate to severe concentric LVH, severe aortic stenosis, severe mitral annular calcification, tricuspid regurgitation and diastolic dysfunction with elevated filling pressures with an EF of 60%      A   Fib with RVR with hx of atrial flutter  -resolved, patient is currently in normal sinus rhythm  - continue with metoprolol and Coumadin  - INR this a m  is 1 81, will recheck tomorrow      CAD s/p CABG in 2004  -continue with aspirin, atorvastatin, metoprolol and lisinopril     PAD  -with left great toe nonhealing wound , status post arteriogram which showed severe 3 vessel tibial disease yesterday without intervention  -continue with aspirin, atorvastatin, metoprolol     Severe AS  -cardiology following  -ECHO 3/2017 showed AS along with moderate MS and mild TR  -sees Dr Sue Jacobson outpatient, was not deemed a candidate for TAVR per Dr Kenyatta Mcbride to small femoral vessels on borderline gradients     Chronic Diastolic CHF  -continue with Lasix and metoprolol     DM II  -hemoglobin A1c was 7 7 yesterday and blood glucose in the past 24 hours has ranged from 165-242  - will continue with insulin sliding scale, diabetic diet and Accu-Cheks     LEONARD, POA  -resolved, creatinine this morning is 0 95  - possibly secondary to contrast used during arteriogram     HTN  -stable  - continue with metoprolol and lisinopril      Hypothyroidism  - stable  - continue with levothyroxine     GERD  -stable  - continue with pantoprazole     HL  -Lipids  normal except for TG of 210  -continue with atorvastatin      Disposition:  Likely discharge today    Subjective:   Patient seen and examined  She says she feels better today  She denies shortness of breath, chest pain, palpitations, nausea, vomiting, fever, chills, night sweats, diarrhea  She admits to constipation  Per nursing staff, she had an uneventful night  Vitals: Temp (24hrs), Av 1 °F (36 7 °C), Min:97 5 °F (36 4 °C), Max:98 6 °F (37 °C)  Current: Temperature: 97 8 °F (36 6 °C)  Vitals:    17 0014 17 0337 17 0600 17 0700   BP: 100/57 101/54  144/67   Pulse: 89 87  67   Resp: 18 20  18   Temp: 98 3 °F (36 8 °C) 98 4 °F (36 9 °C)  97 8 °F (36 6 °C)   TempSrc: Oral   Oral   SpO2: 100% 92%  94%   Weight:   75 9 kg (167 lb 6 4 oz)    Height:        Body mass index is 27 86 kg/m²  I/O last 24 hours: In: 400 [P O :400]  Out: 1710 [Urine:1710]      Physical Exam:   GENERAL:  In no obvious distress, sitting comfortably in her chair, afebrile to touch  HEENT:  Normocephalic, atraumatic  Pupils equal round and reactive to light and accommodation  Extraocular muscles intact bilaterally  Not pale, anicteric  No oropharyngeal erythema    NECK:  Supple, no lymphadenopathy  CVS:  S1, S2   Regular rate and rhythm  3/6 systolic murmur maximal in aortic valve region, rubs or gallops  RESPIRATORY:  Scattered rales in the mid and lower lung zones bilaterally-improved today compared to yesterday  No rhonchi or wheezes  ABDOMEN:  Full, soft,  Nondistended  Tenderness in the epigastric region  No masses or organomegaly  Normoactive bowel sounds  MSK:  No pedal edema  Tiny dried ulcer on the left great toe, laterally  NEURO:  Awake, alert and oriented x3  Cranial nerves 2-12 are grossly intact bilaterally  Sensation is intact  Muscle strength is 5/5 in all extremities      Invasive Devices     Peripheral Intravenous Line            Peripheral IV 12/19/17 Right Arm 2 days    Peripheral IV 12/19/17 Left Arm 1 day                          Labs:   Recent Results (from the past 24 hour(s))   Fingerstick Glucose (POCT)    Collection Time: 12/20/17 10:45 AM   Result Value Ref Range    POC Glucose 224 (H) 65 - 140 mg/dl   Fingerstick Glucose (POCT)    Collection Time: 12/20/17  4:12 PM   Result Value Ref Range    POC Glucose 211 (H) 65 - 140 mg/dl   Fingerstick Glucose (POCT)    Collection Time: 12/20/17  8:17 PM   Result Value Ref Range    POC Glucose 242 (H) 65 - 140 mg/dl   Protime-INR    Collection Time: 12/21/17  5:34 AM   Result Value Ref Range    Protime 21 1 (H) 12 1 - 14 4 seconds    INR 1 81 (H) 0 86 - 1 16   CBC    Collection Time: 12/21/17  5:34 AM   Result Value Ref Range    WBC 10 17 (H) 4 31 - 10 16 Thousand/uL    RBC 4 71 3 81 - 5 12 Million/uL    Hemoglobin 10 7 (L) 11 5 - 15 4 g/dL    Hematocrit 34 1 (L) 34 8 - 46 1 %    MCV 72 (L) 82 - 98 fL    MCH 22 7 (L) 26 8 - 34 3 pg    MCHC 31 4 31 4 - 37 4 g/dL    RDW 17 2 (H) 11 6 - 15 1 %    Platelets 605 627 - 138 Thousands/uL    MPV 10 3 8 9 - 12 7 fL   Basic metabolic panel    Collection Time: 12/21/17  5:34 AM   Result Value Ref Range    Sodium 141 136 - 145 mmol/L    Potassium 4 3 3 5 - 5 3 mmol/L    Chloride 104 100 - 108 mmol/L    CO2 31 21 - 32 mmol/L    Anion Gap 6 4 - 13 mmol/L    BUN 33 (H) 5 - 25 mg/dL    Creatinine 0 95 0 60 - 1 30 mg/dL    Glucose 174 (H) 65 - 140 mg/dL    Calcium 7 3 (L) 8 3 - 10 1 mg/dL    eGFR 57 ml/min/1 73sq m   Fingerstick Glucose (POCT)    Collection Time: 12/21/17  7:07 AM   Result Value Ref Range    POC Glucose 171 (H) 65 - 140 mg/dl       Radiology Results: I have personally reviewed pertinent reports  Other Diagnostic Testing:   I have personally reviewed pertinent reports          Active Meds:   Current Facility-Administered Medications   Medication Dose Route Frequency    albuterol (PROVENTIL HFA,VENTOLIN HFA) inhaler 2 puff  2 puff Inhalation Q4H PRN    aspirin chewable tablet 81 mg  81 mg Oral Daily    atorvastatin (LIPITOR) tablet 80 mg  80 mg Oral Daily    cholecalciferol (VITAMIN D3) tablet 1,000 Units  1,000 Units Oral Daily    fluticasone (FLOVENT HFA) 220 mcg/act inhaler 2 puff  2 puff Inhalation BID    furosemide (LASIX) injection 40 mg  40 mg Intravenous BID (diuretic)    insulin lispro (HumaLOG) 100 units/mL subcutaneous injection 1-5 Units  1-5 Units Subcutaneous HS    insulin lispro (HumaLOG) 100 units/mL subcutaneous injection 1-6 Units  1-6 Units Subcutaneous TID AC    labetalol (NORMODYNE) injection 10 mg  10 mg Intravenous Q6H PRN    levothyroxine tablet 100 mcg  100 mcg Oral Early Morning    lisinopril (ZESTRIL) tablet 5 mg  5 mg Oral HS    magnesium oxide (MAG-OX) tablet 400 mg  400 mg Oral BID    metoprolol succinate (TOPROL-XL) 24 hr tablet 100 mg  100 mg Oral HS    metoprolol succinate (TOPROL-XL) 24 hr tablet 50 mg  50 mg Oral Daily With Breakfast    nitroglycerin (NITROSTAT) SL tablet 0 4 mg  0 4 mg Sublingual Q3 min PRN    ondansetron (ZOFRAN) injection 4 mg  4 mg Intravenous Q6H PRN    oxyCODONE-acetaminophen (PERCOCET) 5-325 mg per tablet 1 tablet  1 tablet Oral Q4H PRN    pantoprazole (PROTONIX) EC tablet 20 mg  20 mg Oral Early Morning    warfarin (COUMADIN) tablet 3 mg  3 mg Oral Once per day on Sun Mon Wed Thu Fri Sat    warfarin (COUMADIN) tablet 6 mg  6 mg Oral Once per day on Tue         VTE Pharmacologic Prophylaxis: Warfarin (Coumadin)  VTE Mechanical Prophylaxis: sequential compression device    Lina Ellington DO

## 2017-12-21 NOTE — DISCHARGE INSTR - AVS FIRST PAGE
Dr Dony Stauffer office will be contacting you to schedule an appointment for a follow up visit  Do not forget to talk to your primary doctor about a referral to go and see and ENT specialist about your throat issues

## 2017-12-21 NOTE — PLAN OF CARE
CARDIOVASCULAR - ADULT     Maintains optimal cardiac output and hemodynamic stability Progressing     Absence of cardiac dysrhythmias or at baseline rhythm Progressing        DISCHARGE PLANNING     Discharge to home or other facility with appropriate resources Progressing        DISCHARGE PLANNING - CARE MANAGEMENT     Discharge to post-acute care or home with appropriate resources Progressing        Knowledge Deficit     Patient/family/caregiver demonstrates understanding of disease process, treatment plan, medications, and discharge instructions Progressing        METABOLIC, FLUID AND ELECTROLYTES - ADULT     Electrolytes maintained within normal limits Progressing     Fluid balance maintained Progressing     Glucose maintained within target range Progressing        Potential for Falls     Patient will remain free of falls Progressing        RESPIRATORY - ADULT     Achieves optimal ventilation and oxygenation Progressing        SAFETY ADULT     Maintain or return to baseline ADL function Progressing     Maintain or return mobility status to optimal level Progressing

## 2017-12-21 NOTE — PROGRESS NOTES
General Cardiology   Progress Note -  [facility]   Toshia Walker 76 y o  female MRN: [id number]    Unit/Bed#: [facility] 529-01 Encounter: [id number]        Subjective:    No significant events overnight  Patient reports she slept well  No chest pain, SOB or palpitations  She is OOB in chair  She is tolerating PO diet  No N/V/D  No fever or chills  Review of Systems   Constitution: Negative for chills and fever  Cardiovascular: Negative for chest pain, dyspnea on exertion and leg swelling  Respiratory: Negative for shortness of breath and wheezing  Musculoskeletal: Negative for falls  Gastrointestinal: Negative for nausea and vomiting  Neurological: Negative for dizziness and light-headedness  Psychiatric/Behavioral: Negative for altered mental status  Objective:   Vitals: Blood pressure 144/67, pulse 67, temperature 97 8 °F (36 6 °C), temperature source Oral, resp  rate 18, height 5' 5" (1 651 m), weight 75 9 kg (167 lb 6 4 oz), SpO2 94 %, not currently breastfeeding ,       Body mass index is 27 86 kg/m²  ,     Systolic (08JEG), FFR:583 , Min:100 , XPM:402     Diastolic (15GHI), VXA:65, Min:54, Max:71      Vitals:    12/20/17 2117 12/21/17 0014 12/21/17 0337 12/21/17 0700   BP: 124/71 100/57 101/54 144/67   Pulse: 82 89 87 67   Patient Position - Orthostatic VS:  Lying  Sitting         Intake/Output Summary (Last 24 hours) at 12/21/17 0919  Last data filed at 12/21/17 0242   Gross per 24 hour   Intake              400 ml   Output             1560 ml   Net            -1160 ml     Weight (last 2 days)     Date/Time   Weight    12/21/17 0600  75 9 (167 4)    12/20/17 0522  76 9 (169 53)    12/19/17 1600  75 1 (165 57)    12/19/17 0700  75 3 (166)            Telemetry Review: Normal sinus rhythm with 1st degree AV block  HR 70s with occasional NSVT       Physical Exam   Constitutional: She is oriented to person, place, and time  No distress  Neck: No JVD present     Cardiovascular: Normal rate, regular rhythm and intact distal pulses  Murmur heard  Grade III systolic murmur    Pulmonary/Chest: Effort normal and breath sounds normal  No respiratory distress  She has no wheezes  RA  No crackles    Abdominal: Soft  Bowel sounds are normal  She exhibits no distension  Musculoskeletal: She exhibits no edema  Neurological: She is alert and oriented to person, place, and time  Skin: Skin is warm and dry  She is not diaphoretic  Psychiatric: She has a normal mood and affect   Her behavior is normal        LABORATORY RESULTS    Results from last 7 days  Lab Units 12/20/17  0806 12/20/17  0537 12/19/17  2141   TROPONIN I ng/mL 2 38* 2 85*  2 88* 1 34*     CBC with diff:   Results from last 7 days  Lab Units 12/21/17  0534 12/20/17  0537 12/19/17  1424 12/19/17  1416 12/14/17  1321   WBC Thousand/uL 10 17* 12 27* 13 99*  --  10 91*   HEMOGLOBIN g/dL 10 7* 11 2* 12 9  --  11 6   I STAT HEMOGLOBIN g/dl  --   --   --  15 0  --    HEMATOCRIT % 34 1* 35 1 41 0  --  37 6   MCV fL 72* 72* 73*  --  74*   PLATELETS Thousands/uL 316 338 440*  --  315   MCH pg 22 7* 23 1* 23 0*  --  22 8*   MCHC g/dL 31 4 31 9 31 5  --  30 9*   RDW % 17 2* 17 3* 17 2*  --  17 3*   MPV fL 10 3 10 9 10 8  --  11 4   NRBC AUTO /100 WBCs  --   --  0  --   --        CMP:  Results from last 7 days  Lab Units 12/21/17  0534 12/20/17  0537 12/19/17  1424 12/19/17  1416 12/14/17  1321   SODIUM mmol/L 141 138 135*  --  140   POTASSIUM mmol/L 4 3 3 9 4 0  --  4 2   CHLORIDE mmol/L 104 101 101  --  102   CO2 mmol/L 31 28 19*  --  27   ANION GAP mmol/L 6 9 15*  --  11   BUN mg/dL 33* 30* 25  --  25   CREATININE mg/dL 0 95 0 96 1 44*  --  0 87   GLUCOSE RANDOM mg/dL 174* 167* 483*  --  137   GLUCOSE, ISTAT mg/dl  --   --   --  456*  --    CALCIUM mg/dL 7 3* 7 4* 7 6*  --  8 0*   AST U/L  --  27  --   --   --    ALT U/L  --  25  --   --   --    ALK PHOS U/L  --  75  --   --   --    TOTAL PROTEIN g/dL  --  7 2  --   --   --    ALBUMIN g/dL  -- 3 1*  --   --   --    BILIRUBIN TOTAL mg/dL  --  0 73  --   --   --    EGFR ml/min/1 73sq m 57 56 35  --  64       BMP:  Results from last 7 days  Lab Units 12/21/17  0534 12/20/17  0537 12/19/17  1424  12/14/17  1321   SODIUM mmol/L 141 138 135*  --  140   POTASSIUM mmol/L 4 3 3 9 4 0  --  4 2   CHLORIDE mmol/L 104 101 101  --  102   CO2 mmol/L 31 28 19*  --  27   BUN mg/dL 33* 30* 25  --  25   CREATININE mg/dL 0 95 0 96 1 44*  --  0 87   GLUCOSE RANDOM mg/dL 174* 167* 483*  --  137   GLUCOSE, ISTAT   --   --   --   < >  --    CALCIUM mg/dL 7 3* 7 4* 7 6*  --  8 0*   < > = values in this interval not displayed  Results from last 7 days  Lab Units 12/19/17  1424   MAGNESIUM mg/dL 1 1*        Results from last 7 days  Lab Units 12/19/17  1649   HEMOGLOBIN A1C % 7 7*          Results from last 7 days  Lab Units 12/19/17  1424   TSH 3RD GENERATON uIU/mL 0 763         Results from last 7 days  Lab Units 12/21/17  0534 12/20/17  0537 12/19/17  0720 12/14/17  1321   INR  1 81* 1 56* 1 43* 2 97*       Lipid Profile:   Lab Results   Component Value Date    CHOL 147 07/14/2017    CHOL 140 11/18/2016    CHOL 143 08/04/2016     Lab Results   Component Value Date    HDL 42 07/14/2017    HDL 52 11/18/2016    HDL 43 08/04/2016     Lab Results   Component Value Date    LDLCALC 63 07/14/2017    LDLCALC 50 11/18/2016    LDLCALC 64 08/04/2016     Lab Results   Component Value Date    TRIG 210 (H) 07/14/2017    TRIG 189 (H) 11/18/2016    TRIG 178 (H) 08/04/2016     Cardiac testing:   No results found for this or any previous visit  No results found for this or any previous visit  No results found for this or any previous visit  No procedure found  No results found for this or any previous visit      Meds/Allergies   all current active meds have been reviewed and current meds:   Current Facility-Administered Medications   Medication Dose Route Frequency    albuterol (PROVENTIL HFA,VENTOLIN HFA) inhaler 2 puff  2 puff Inhalation Q4H PRN    aspirin chewable tablet 81 mg  81 mg Oral Daily    atorvastatin (LIPITOR) tablet 80 mg  80 mg Oral Daily    cholecalciferol (VITAMIN D3) tablet 1,000 Units  1,000 Units Oral Daily    fluticasone (FLOVENT HFA) 220 mcg/act inhaler 2 puff  2 puff Inhalation BID    [START ON 12/22/2017] furosemide (LASIX) tablet 40 mg  40 mg Oral Daily    insulin lispro (HumaLOG) 100 units/mL subcutaneous injection 1-5 Units  1-5 Units Subcutaneous HS    insulin lispro (HumaLOG) 100 units/mL subcutaneous injection 1-6 Units  1-6 Units Subcutaneous TID AC    labetalol (NORMODYNE) injection 10 mg  10 mg Intravenous Q6H PRN    levothyroxine tablet 100 mcg  100 mcg Oral Early Morning    lisinopril (ZESTRIL) tablet 5 mg  5 mg Oral HS    magnesium oxide (MAG-OX) tablet 400 mg  400 mg Oral BID    metoprolol succinate (TOPROL-XL) 24 hr tablet 100 mg  100 mg Oral HS    metoprolol succinate (TOPROL-XL) 24 hr tablet 50 mg  50 mg Oral Daily With Breakfast    nitroglycerin (NITROSTAT) SL tablet 0 4 mg  0 4 mg Sublingual Q3 min PRN    ondansetron (ZOFRAN) injection 4 mg  4 mg Intravenous Q6H PRN    oxyCODONE-acetaminophen (PERCOCET) 5-325 mg per tablet 1 tablet  1 tablet Oral Q4H PRN    pantoprazole (PROTONIX) EC tablet 20 mg  20 mg Oral Early Morning    warfarin (COUMADIN) tablet 3 mg  3 mg Oral Once per day on Sun Mon Wed Thu Fri Sat    warfarin (COUMADIN) tablet 6 mg  6 mg Oral Once per day on Tue     Prescriptions Prior to Admission   Medication    aspirin 81 MG tablet    atorvastatin (LIPITOR) 80 mg tablet    cholecalciferol (VITAMIN D3) 1,000 units tablet    esomeprazole (NexIUM) 20 mg capsule    furosemide (LASIX) 40 mg tablet    levothyroxine 100 mcg tablet    lisinopril (ZESTRIL) 5 mg tablet    Magnesium Oxide 400 MG CAPS    metoprolol succinate (TOPROL-XL) 100 mg 24 hr tablet    metoprolol succinate (TOPROL-XL) 50 mg 24 hr tablet    mometasone (ASMANEX 14 METERED DOSES) 220 MCG/INH inhaler  nitroglycerin (NITROSTAT) 0 4 mg SL tablet    warfarin (COUMADIN) 3 mg tablet    albuterol (VENTOLIN HFA) 90 mcg/act inhaler    metFORMIN (GLUCOPHAGE) 1000 MG tablet     Assessment/ Plan    1  Acute on chronic diastolic heart failure in the setting of accelerated hypertension, missed diuretic dose due to Angiogram with IVF post procedure and history of severe aortic stenosis  Patient was on furosemide 40 mg IV BID  Changed to oral, will start tomorrow  She already received IV diuretic this morning  I/O -2 4 L since admission   She appears euvolemic now       2  NSTEMI likely type II- secondary to #1, afib with RVR and LEONARD   Troponin peaked to 2 88  No complaint of chest pain   ECG showed afib with LBBB  LBBB not new  Continue aspirin and statin      3  Atrial fibrillation with RVR- patient has history of atrial flutter  She is now in NSR with 1st degree AV block   Continue Metoprolol XL 50 mg PO Q AM and 100 mg PO Q PM   Patient on coumadin for [address]  INR 1 81     4  CAD- s/p CABG LIMA to LAD, SVG to D1, SVG to OM and SVG to PDA in 2004  Continue home medications: aspirin and atorvastatin 80 mg PO daily     5  PAD with non healing L great toe wound- followed by vascular surgery  Patient had diagnostic agram yesterday with no intervention showing severe tibial disease  On aspirin and statin at home      6  Severe aortic stenosis- last echocardiogram 3/24/17 showed EF 60%, moderate to severe concentric hypertrophy, severe aortic stenosis, severe mitral calcification with at least moderate mitral stenosis   Patient follows with Dr Noble Delgado and was referred to Dr Shay Magaña for TAVR workup  CTA showed no porcelain aorta with small caliber but patent aorta and iliac arteries   She was found to have rather small femoral vessels and borderline gradients therefor TAVR was not pursued     Counseling / Coordination of Care  Total floor / unit time spent today 25 minutes    Greater than 50% of total time was spent with the patient and / or family counseling and / or coordination of care  ** Please Note: Dragon 360 Dictation voice to text software may have been used in the creation of this document   **

## 2017-12-21 NOTE — DISCHARGE INSTRUCTIONS
DISCHARGE INSTRUCTIONS  ARTERIOGRAM/ANGIOPLASTY/STENT    Following discharge from the hospital, you may have some questions about your procedure, your activities or your general condition  These instructions may answer some of your questions and help you adjust during the first few weeks following your operation  ACTIVITY: The evening following the procedure you should be sure someone remains with you until the next morning  Rest as much as possible, sitting, lying or reclining  Use the stairs as little as possible  The following day, limit your activity to walking  Avoid stooping or heavy lifting (no more than 30 lbs) for the first three days  Walking up steps and normal activities may be resumed as you feel ready  You should not drive a car for at least two days following discharge from the hospital  You may ride in a car  If you have any questions regarding a particular activity, please discuss with your doctor or nurse before you are discharged  DIET:  Resume your normal diet  Try to eat low fat and low cholesterol foods  Drink more liquids than usual for the next 24 hours  INCISION: Your doctor may have chosen to use a type of adhesive glue, to close your incision  The glue is used to cover the incision, assist in closure, and prevent contamination  This adhesive will darken and peel away on its own within one to two weeks  You may shower after the procedure, but do not scrub the incision  Sitting in a tub is not recommended for the two days following the procedure or if you have any open wounds  It is normal to have some bruising, swelling or mild discoloration around the incision  IF increasing redness or pain develops, call our office immediately  If present, you may remove the band-aid or steri-strips over your wound after two days  If you notice any active bleeding at the site, apply pressure to the site and call our office ([phone])      FOLLOW UP STUDIES:  Your doctor will discuss whether further treatments or follow-up studies are necessary at your first post procedure visit  Appt w/ Dr Laws Pulse Doctor: 1/4/18 at Luisa Sharp office  [address], [address]  [address] [facility] [phone]  [address] [place] [address] , [address] [facility], [address]  [address], [address]  [address], [facility],  [address]  [address] [facility], [address] [facility], [facility], [address]    [address], [address]  [address] [facility] [address]   [address] [facility], [address], [place], [address] [facility]  [address], [address] [address]       _________________________________________________________________________________________        ARTERIOGRAM    WHAT YOU SHOULD KNOW:   An angiogram is a procedure to look at arteries in your body  Arteries are the blood vessels that carry blood from your heart to your body  AFTER YOU LEAVE:     Self-care:   · Limit activity: Rest for the remainder of the day of your procedure  Have some one with you until the next morning  Keep your arm or leg straight as much as possible  Rest as much as possible, sitting lying or reclining  Walk only to go to the bathroom, to bed or to eat  If the angiogram catheter was put in your leg, use the stairs as little as possible  No driving  · Keep your wound clean and dry  You may shower 24 hours after your procedure  The bandage you have on should fall off in 2-3 days  If there is any drainage from the puncture site, you should put on a clean bandage  · Watch for bleeding and bruising: It is normal to have a bruise and soreness where the angiogram catheter went in  · Diet:   · You may resume your regular diet, Sips of flat soda will help with mild nausea    · Drink more liquids than usual for the next 24 hours      · IMMEDIATELY Contact Interventional Radiology at [phone] Lizandro PATIENTS: Contact Interventional Radiology at [phone]) Dina Almodovar PATIENTS: Contact Interventional Radiology at [phone]) if any of the following occur:  · If your bruise gets larger or if you notice any active bleeding  APPLY DIRECT PRESSURE TO THE BLEEDING SITE  · If you notice increased swelling or have increased pain at the puncture site   · If you have any numbness or pain in the extremity of the puncture site   · If that extremity seems cold or pale  · You have fever greater than 101  · Persistent nausea or vomitting    Follow up with your primary healthcare provider  as directed: Write down your questions so you remember to ask them during your visits              Heart Failure   WHAT YOU NEED TO KNOW:   What is heart failure? Heart failure (HF) is a condition that does not allow your heart to fill or pump properly  Not enough oxygen in your blood gets to your organs and tissues  HF can occur in the right side, the left side, or both lower chambers of your heart  HF is often caused by damage or injury to your heart  The damage is caused by other heart problems or high blood pressure  HF is a long-term condition that tends to get worse over time  It is important to manage your health to improve your quality of life  HF can be worsened by heavy alcohol use, smoking, diabetes that is not controlled, or obesity  What are the signs and symptoms of HF? The signs and symptoms depend on how severe your HF is   You may have any of the following:  · Difficulty breathing with activity that worsens to difficulty breathing at rest    · Shortness of breath while lying flat    · Severe shortness of breath and coughing at night that usually wakes you     · Chest pain at night    · Periods of no breathing, then breathing fast    · Fatigue or lack of energy (often worsened by physical activity)     · Swelling in your ankles, legs, or abdomen    · Fast heartbeat, purple color around your mouth and nails    · Fingers and toes cool to the touch  How is HF diagnosed? Tell your healthcare provider about your health history and the medicines you take  He or she will ask questions about your shortness of breath and other symptoms  Your healthcare provider will make a diagnosis based on your physical exam, symptoms, and tests  You may need any of the following:  · Blood tests  are used to check for any damage to your heart  Blood tests also give healthcare providers information about your kidney, liver, and thyroid function  The results can also show an infection  · An EKG  test records your heart rhythm and how fast your heart beats  It shows healthcare providers if you have heart block or have had a heart attack  · An echocardiogram  is a type of ultrasound  Sound waves are used to show the structure and function of your heart  · X-ray, CT, or MRI  pictures may be taken of your heart and lungs  The pictures may show the cause of your HF, or blood clots or fluid in your lungs  You may be given contrast liquid before a CT scan or MRI to help healthcare providers see the pictures better  Tell the healthcare provider if you have ever had an allergic reaction to contrast liquid  Do not enter the MRI room with anything metal  Metal can cause serious injury  Tell the healthcare provider if you have any metal in or on your body  How is HF treated? The goals of treatment are to manage and slow, or reverse, heart damage  Your healthcare providers will manage your other health conditions that may be causing your HF  Examples include high blood pressure and hyperlipidemia  Treatment may include the following:  · Medicines  help regulate your heart rhythm, lower your blood pressure, and get rid of extra fluids  · Cardiac rehab  is a program run by specialists who will help you safely strengthen your heart   The program includes exercise, relaxation, stress management, and heart-healthy nutrition  Healthcare providers will also make sure your medicines are helping to reduce your symptoms  · Oxygen  may help you breathe easier if your oxygen level is lower than normal  A CPAP machine may be used to keep your airway open while you sleep  · Surgery  can be done to implant a pacemaker in your chest to regulate your heart rhythm  Other types of surgery can open blocked heart vessels, replace a damaged heart valve, or remove scar tissue  What can I do to manage HF? Your quality of life may improve with treatment and the following:  · Do not smoke  Nicotine and other chemicals in cigarettes and cigars can cause lung damage and make HF difficult to manage  Ask your healthcare provider for information if you currently smoke and need help to quit  E-cigarettes or smokeless tobacco still contain nicotine  Talk to your healthcare provider before you use these products  · Do not drink alcohol or take illegal drugs  Alcohol and drugs can worsen your symptoms quickly  · Weigh yourself every morning  Use the same scale, in the same spot  Do this after you use the bathroom, but before you eat or drink anything  Wear the same type of clothing  Do not wear shoes  Record your weight each day so you will notice any sudden weight gain  Swelling and weight gain are signs of fluid retention  If you are overweight, ask how to lose weight safely  · Check your blood pressure and heart rate every day  Ask for more information about how to measure your blood pressure and heart rate correctly  Ask what these numbers should be for you  · Manage any chronic health conditions you have  These include high blood pressure, diabetes, obesity, high cholesterol, metabolic syndrome, and COPD  You will have fewer symptoms if you manage these health conditions  Follow your healthcare provider's recommendations and follow up with him or her regularly       · Eat heart-healthy foods and limit sodium (salt)  An easy way to do this is to eat more fresh fruits and vegetables and fewer canned and processed foods  Replace butter and margarine with heart-healthy oils such as olive oil and canola oil  Other heart-healthy foods include walnuts, whole-grain breads, low-fat dairy products, beans, and lean meats  Fatty fish such as salmon and tuna are also heart healthy  Ask how much salt you can eat each day  Do not use salt substitutes  · Drink liquids as directed  You may need to limit the amount of liquids you drink if you retain fluid  Ask how much liquid to drink each day and which liquids are best for you  · Stay active  If you are not active, your symptoms are likely to worsen quickly  Walking, bicycling, and other types of physical activity help maintain your strength and improve your mood  Physical activity also helps you manage your weight  Work with your healthcare provider to create an exercise plan that is right for you  · Get vaccines as directed  Get a flu shot every year  You may also need the pneumonia vaccine  The flu and pneumonia can be severe for a person who has HF  Vaccines protect you from these infections  Call 911 if:   · You have any of the following signs of a heart attack:      ¨ Squeezing, pressure, or pain in your chest that lasts longer than 5 minutes or returns    ¨ Discomfort or pain in your back, neck, jaw, stomach, or arm     ¨ Trouble breathing    ¨ Nausea or vomiting    ¨ Lightheadedness or a sudden cold sweat, especially with chest pain or trouble breathing    When should I seek immediate care? · You gain 3 or more pounds (1 4 kg) in a day, or more than your healthcare provider says you should  · Your heartbeat is fast, slow, or uneven all the time  When should I contact my healthcare provider?    · You have symptoms of worsening HF:      ¨ Shortness of breath at rest, at night, or that is getting worse in any way     ¨ Weight gain of 5 or more pounds (2 2 kg) in a week     ¨ More swelling in your legs or ankles     ¨ Abdominal pain or swelling     ¨ More coughing     ¨ Feeling tired all the time    · You feel hopeless or depressed, or you have lost interest in things you used to enjoy  · You often feel worried or afraid  · You have questions or concerns about your condition or care  CARE AGREEMENT:   You have the right to help plan your care  Learn about your health condition and how it may be treated  Discuss treatment options with your caregivers to decide what care you want to receive  You always have the right to refuse treatment  The above information is an  only  It is not intended as medical advice for individual conditions or treatments  Talk to your doctor, nurse or pharmacist before following any medical regimen to see if it is safe and effective for you  © 2017 [address] [employer] Information is for End User's use only and may not be sold, redistributed or otherwise used for commercial purposes  All illustrations and images included in CareNotes® are the copyrighted property of A D [employer] M [employer]  or Luis Felipe Spaulding  Take your medications as directed, and keep your follow up appointments  Adhere to a heart healthy lifestyle, maintaining a low sodium diet  Daily weight and record  If your weight increases 2-3 lbs in one day, or 5 lbs in 2 days, you are short of breath or have lower extremity swelling, please call the heart failure team at  [facility] at [phone]

## 2017-12-26 ENCOUNTER — LAB REQUISITION (OUTPATIENT)
Dept: LAB | Facility: HOSPITAL | Age: 77
End: 2017-12-26
Payer: MEDICARE

## 2017-12-26 DIAGNOSIS — Z86.79 PERSONAL HISTORY OF OTHER DISEASES OF THE CIRCULATORY SYSTEM (CODE): ICD-10-CM

## 2017-12-26 LAB
INR PPP: 1.84 (ref 0.86–1.16)
PROTHROMBIN TIME: 21.4 SECONDS (ref 12.1–14.4)

## 2017-12-26 PROCEDURE — 85610 PROTHROMBIN TIME: CPT | Performed by: INTERNAL MEDICINE

## 2017-12-27 ENCOUNTER — GENERIC CONVERSION - ENCOUNTER (OUTPATIENT)
Dept: OTHER | Facility: OTHER | Age: 77
End: 2017-12-27

## 2017-12-28 ENCOUNTER — GENERIC CONVERSION - ENCOUNTER (OUTPATIENT)
Dept: OTHER | Facility: OTHER | Age: 77
End: 2017-12-28

## 2017-12-28 ENCOUNTER — ALLSCRIPTS OFFICE VISIT (OUTPATIENT)
Dept: OTHER | Facility: OTHER | Age: 77
End: 2017-12-28

## 2018-01-04 ENCOUNTER — GENERIC CONVERSION - ENCOUNTER (OUTPATIENT)
Dept: OTHER | Facility: OTHER | Age: 78
End: 2018-01-04

## 2018-01-08 ENCOUNTER — GENERIC CONVERSION - ENCOUNTER (OUTPATIENT)
Dept: INTERNAL MEDICINE CLINIC | Facility: CLINIC | Age: 78
End: 2018-01-08

## 2018-01-10 NOTE — MISCELLANEOUS
List of Surgeries:    12/22/2004:  Coronary artery bypass graft x6  2/7/2014:  Right Extracorporeal shock-wave lithotripsy  10/28/2014:  Cataract - left eye  11/11/2014:  Cataract - right eye  6/5/2016:  Cardiac  catheterization            Electronically signed by:Abel Redd   Aug 29 2017  7:12AM EST Author
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
## 2018-01-12 ENCOUNTER — ALLSCRIPTS OFFICE VISIT (OUTPATIENT)
Dept: OTHER | Facility: OTHER | Age: 78
End: 2018-01-12

## 2018-01-12 VITALS
SYSTOLIC BLOOD PRESSURE: 118 MMHG | TEMPERATURE: 98.4 F | HEART RATE: 90 BPM | HEIGHT: 65 IN | DIASTOLIC BLOOD PRESSURE: 74 MMHG | OXYGEN SATURATION: 98 % | BODY MASS INDEX: 29.87 KG/M2 | WEIGHT: 179.25 LBS

## 2018-01-12 VITALS
HEIGHT: 64 IN | OXYGEN SATURATION: 98 % | DIASTOLIC BLOOD PRESSURE: 80 MMHG | SYSTOLIC BLOOD PRESSURE: 138 MMHG | BODY MASS INDEX: 30.63 KG/M2 | TEMPERATURE: 98.1 F | HEART RATE: 70 BPM | WEIGHT: 179.38 LBS

## 2018-01-13 VITALS
BODY MASS INDEX: 30.26 KG/M2 | HEIGHT: 64 IN | TEMPERATURE: 99.6 F | DIASTOLIC BLOOD PRESSURE: 80 MMHG | WEIGHT: 177.25 LBS | OXYGEN SATURATION: 98 % | SYSTOLIC BLOOD PRESSURE: 128 MMHG | HEART RATE: 74 BPM

## 2018-01-13 VITALS
HEIGHT: 65 IN | SYSTOLIC BLOOD PRESSURE: 120 MMHG | DIASTOLIC BLOOD PRESSURE: 80 MMHG | OXYGEN SATURATION: 96 % | BODY MASS INDEX: 29.74 KG/M2 | TEMPERATURE: 97.4 F | HEART RATE: 64 BPM | WEIGHT: 178.5 LBS

## 2018-01-13 VITALS
HEART RATE: 60 BPM | DIASTOLIC BLOOD PRESSURE: 78 MMHG | BODY MASS INDEX: 29.88 KG/M2 | WEIGHT: 175 LBS | HEIGHT: 64 IN | RESPIRATION RATE: 20 BRPM | SYSTOLIC BLOOD PRESSURE: 118 MMHG

## 2018-01-13 VITALS
SYSTOLIC BLOOD PRESSURE: 128 MMHG | BODY MASS INDEX: 30.24 KG/M2 | TEMPERATURE: 97.8 F | OXYGEN SATURATION: 97 % | HEART RATE: 69 BPM | HEIGHT: 64 IN | DIASTOLIC BLOOD PRESSURE: 76 MMHG | WEIGHT: 177.13 LBS

## 2018-01-13 VITALS
TEMPERATURE: 98.5 F | SYSTOLIC BLOOD PRESSURE: 118 MMHG | HEIGHT: 64 IN | DIASTOLIC BLOOD PRESSURE: 74 MMHG | WEIGHT: 176 LBS | HEART RATE: 78 BPM | BODY MASS INDEX: 30.05 KG/M2 | OXYGEN SATURATION: 98 %

## 2018-01-13 VITALS
WEIGHT: 178.38 LBS | TEMPERATURE: 98.3 F | DIASTOLIC BLOOD PRESSURE: 62 MMHG | SYSTOLIC BLOOD PRESSURE: 112 MMHG | OXYGEN SATURATION: 97 % | HEIGHT: 64 IN | BODY MASS INDEX: 30.45 KG/M2 | HEART RATE: 81 BPM

## 2018-01-14 VITALS
HEIGHT: 65 IN | HEART RATE: 66 BPM | BODY MASS INDEX: 29.16 KG/M2 | WEIGHT: 175 LBS | DIASTOLIC BLOOD PRESSURE: 86 MMHG | RESPIRATION RATE: 16 BRPM | SYSTOLIC BLOOD PRESSURE: 124 MMHG

## 2018-01-14 VITALS
HEART RATE: 80 BPM | DIASTOLIC BLOOD PRESSURE: 64 MMHG | OXYGEN SATURATION: 97 % | WEIGHT: 178.13 LBS | TEMPERATURE: 98.2 F | HEIGHT: 64 IN | BODY MASS INDEX: 30.41 KG/M2 | SYSTOLIC BLOOD PRESSURE: 120 MMHG

## 2018-01-14 VITALS
HEART RATE: 72 BPM | BODY MASS INDEX: 30.45 KG/M2 | WEIGHT: 178.38 LBS | HEIGHT: 64 IN | TEMPERATURE: 99.6 F | SYSTOLIC BLOOD PRESSURE: 120 MMHG | OXYGEN SATURATION: 98 % | DIASTOLIC BLOOD PRESSURE: 78 MMHG

## 2018-01-15 ENCOUNTER — LAB REQUISITION (OUTPATIENT)
Dept: LAB | Facility: HOSPITAL | Age: 78
End: 2018-01-15
Payer: MEDICARE

## 2018-01-15 ENCOUNTER — ALLSCRIPTS OFFICE VISIT (OUTPATIENT)
Dept: OTHER | Facility: OTHER | Age: 78
End: 2018-01-15

## 2018-01-15 VITALS
BODY MASS INDEX: 30.22 KG/M2 | SYSTOLIC BLOOD PRESSURE: 120 MMHG | RESPIRATION RATE: 14 BRPM | HEART RATE: 84 BPM | HEIGHT: 64 IN | WEIGHT: 177 LBS | DIASTOLIC BLOOD PRESSURE: 70 MMHG

## 2018-01-15 VITALS
DIASTOLIC BLOOD PRESSURE: 82 MMHG | SYSTOLIC BLOOD PRESSURE: 130 MMHG | WEIGHT: 180.25 LBS | BODY MASS INDEX: 30.77 KG/M2 | TEMPERATURE: 98.2 F | HEART RATE: 82 BPM | HEIGHT: 64 IN | OXYGEN SATURATION: 98 %

## 2018-01-15 DIAGNOSIS — I48.92 ATRIAL FLUTTER (HCC): ICD-10-CM

## 2018-01-15 LAB
INR PPP: 4.3 (ref 0.86–1.16)
PROTHROMBIN TIME: 42 SECONDS (ref 12.1–14.4)

## 2018-01-15 PROCEDURE — 85610 PROTHROMBIN TIME: CPT | Performed by: INTERNAL MEDICINE
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
## 2018-01-15 NOTE — MISCELLANEOUS
Message   Recorded as Task   Date: 09/01/2017 03:11 PM, Created By: Heriberto Vance   Task Name: Call Patient with results   Assigned To: [facility] pa,team   Regarding Patient: Shanna Raphael, Status: Active   CommentSixto Ash - 01 Sep 2017 3:11 PM     Patient Phone: [phone]    pt called and aware of instructions   Abel Redd - 03 Sep 2017 6:08 PM     TASK REASSIGNED: Previously Assigned To Melva Monaco   Electronically signed by : Aishwarya Caceres RN; Sep  5 2017  3:50PM EST                       (Author)    Electronically signed by : Susan Ledezma, [facility]; Sep 12 2017 11:23AM EST                       (Author)

## 2018-01-16 ENCOUNTER — APPOINTMENT (OUTPATIENT)
Dept: LAB | Facility: HOSPITAL | Age: 78
End: 2018-01-16
Attending: INTERNAL MEDICINE
Payer: MEDICARE

## 2018-01-16 ENCOUNTER — GENERIC CONVERSION - ENCOUNTER (OUTPATIENT)
Dept: OTHER | Facility: OTHER | Age: 78
End: 2018-01-16

## 2018-01-16 DIAGNOSIS — I35.0 NONRHEUMATIC AORTIC VALVE STENOSIS: ICD-10-CM

## 2018-01-16 DIAGNOSIS — I50.32 CHRONIC DIASTOLIC HEART FAILURE (HCC): ICD-10-CM

## 2018-01-16 LAB
ANION GAP SERPL CALCULATED.3IONS-SCNC: 10 MMOL/L (ref 4–13)
BUN SERPL-MCNC: 25 MG/DL (ref 5–25)
CALCIUM SERPL-MCNC: 6.8 MG/DL (ref 8.3–10.1)
CHLORIDE SERPL-SCNC: 102 MMOL/L (ref 100–108)
CO2 SERPL-SCNC: 28 MMOL/L (ref 21–32)
CREAT SERPL-MCNC: 0.86 MG/DL (ref 0.6–1.3)
GFR SERPL CREATININE-BSD FRML MDRD: 64 ML/MIN/1.73SQ M
GLUCOSE SERPL-MCNC: 175 MG/DL (ref 65–140)
POTASSIUM SERPL-SCNC: 4 MMOL/L (ref 3.5–5.3)
SODIUM SERPL-SCNC: 140 MMOL/L (ref 136–145)

## 2018-01-16 PROCEDURE — 36415 COLL VENOUS BLD VENIPUNCTURE: CPT

## 2018-01-16 PROCEDURE — 80048 BASIC METABOLIC PNL TOTAL CA: CPT

## 2018-01-16 NOTE — RESULT NOTES
Verified Results  (1) PT WITH INR [id number] 03:57PM Farhatkia Jagruti     Test Name Result Flag Reference   INR 3 15 H 0 86-1 16   PT 32 8 seconds H 12 1-14 4

## 2018-01-16 NOTE — PROGRESS NOTES
Assessment   Assessed    1  Chronic diastolic congestive heart failure (428 32,428 0) (I50 32)   2  Severe aortic stenosis (424 1) (I35 0)   3  Moderate mitral stenosis (394 0) (I05 0)   4  Coronary artery disease (414 00) (I25 10)   5  CABG   6  Benign essential HTN (401 1) (I10)   7  HLD (hyperlipidemia) (272 4) (E78 5)   8  Persistent atrial fibrillation (427 31) (I48 1)   9  Atrial flutter (427 32) (I48 92)    Plan   Chronic diastolic congestive heart failure, Severe aortic stenosis    · Continue with our present treatment plan ; Status:Complete;   Done: 31IYT2697   Ordered; For:Chronic diastolic congestive heart failure, Severe aortic stenosis; Ordered By:Jayla Bowen;   · Restrict your sodium (salt) intake to 2 grams per day ; Status:Complete;   Done:    27XJV4508   Ordered; For:Chronic diastolic congestive heart failure, Severe aortic stenosis; Ordered By:Jayla Bowen;   · Weigh yourself every day ; Status:Complete;   Done: 30PQR0751   Ordered; For:Chronic diastolic congestive heart failure, Severe aortic stenosis; Ordered By:Jayla Bowen;   · (1) BASIC METABOLIC PROFILE; Status:Active; Requested TEV:54PTJ0048; Perform:[facility]; PCZ:37FIB3475;DZJUEFE; For:Chronic diastolic congestive heart failure, Severe aortic stenosis; Ordered By:Jayla Bowen;   · Follow-up visit in 2 months Evaluation and Treatment  Follow-up  Status: Complete     Done: 71QCT8189   Ordered; For: Chronic diastolic congestive heart failure, Severe aortic stenosis; Ordered By: Syed Howell Performed:  Due: 64ZYB3374; Last Updated By: Ronnie Justice; 1/15/2018 9:59:04 AM    Discussion/Summary   Cardiology Discussion Summary Free Text Note Form [facility]:     Chronic diastolic CHF - Russ Rascon appears compensated at this point time  We went over watching a low-sodium diet and watching her weight  She will remain on the same dose of Lasix   I did state that if she becomes more short of breath or gains weight, she can take an extra dose of Lasix  No changes were made today  I did order a BMP  We will see her back in 2 months  Valvular heart disease - Mercedes Nath does have severe aortic stenosis and moderate mitral stenosis  At this point we will refer her to CT surgery for an open heart surgery consultation versus TAVR  she will require cardiac catheterization and ROBB, and this can be coordinated through the CT surgery office  CAD - Prior history of CABG in 2004  No changes were made from this perspective  She is without symptoms of angina  Atrial fibrillation - Mercedes Nath has persistent atrial fibrillation  No changes were made  Heart rates are well controlled  She will remain on Coumadin for stroke prevention  We will see her back in 2 months  Counseling Documentation With Imm: The patient was counseled regarding diagnostic results,-- instructions for management,-- impressions  total time of encounter was 25 minutes  Chief Complaint   Chief Complaint Free Text Note Form: Patient is here for a follow up  Patient complaints of sob  History of Present Illness   Cardiology HPI Free Text Note Form [facility]: Mr Randhawa Moment in for a post hospitalization visit regarding her history of coronary artery disease, status post CABG, diastolic congestive heart failure, atrial fibrillation and now severe aortic stenosis  Mercedes Nath was in the hospital last month having an elective angiogram given her peripheral arterial disease  Postprocedure she developed flash pulmonary edema and was admitted for CHF exacerbation  She was persistently in atrial fibrillation and was rapid as well, and she also has a chronic left bundle branch block  She received several doses of IV Lasix, And then was placed back on her home dose of Lasix 40 mg daily  She is now here for follow-up  Her last echocardiogram was from March of last year which did show a normal LV systolic function, severe aortic stenosis and moderate mitral stenosis   She also had moderate to severe LVH  feels better since her hospitalization  She still has shortness of breath with exertion, which is expected  She denies chest pain or any symptoms of angina  She denies lightheadedness, palpitations or any history of syncope  She shows no signs of volume overload  No orthopnea, PND or any other symptoms of CHF  Coronary Artery Disease (Follow-Up): The patient states she has been stable with her coronary artery disease symptoms since the last visit  She has no comorbid illnesses  She has no known CAD complications  She has no significant interval events  Symptoms: The patient is currently asymptomatic  stable dyspnea  Associated symptoms include no syncope,-- no palpitations,-- no PND,-- no orthopnea-- and-- no edema  Hypertension (Follow-Up): The patient presents for follow-up of essential hypertension  The patient states she has been stable with her blood pressure control since the last visit  Comorbid Illnesses: cardiac failure-- and-- coronary artery disease  She has no significant interval events  Symptoms: The patient is currently asymptomatic  stable dyspnea  Review of Systems   Cardiology Female ROS:         Cardiac: as noted in HPI  Skin: No complaints of nonhealing sores or skin rash  Genitourinary: No complaints of recurrent urinary tract infections, frequent urination at night, difficult urination, blood in urine, kidney stones, loss of bladder control, kidney problems, denies any birth control or hormone replacement, is not post menopausal, not currently pregnant  Psychological: No complaints of feeling depressed, anxiety, panic attacks, or difficulty concentrating  General: lack of energy/fatigue  Respiratory: shortness of breath, but-- as noted in HPI  HEENT: No complaints of serious problems, hearing problems, nose problems, throat problems, or snoring        Gastrointestinal: No complaints of liver problems, nausea, vomiting, heartburn, constipation, bloody stools, diarrhea, problems swallowing, adbominal pain, or rectal bleeding  Hematologic: No complaints of bleeding disorders, anemia, blood clots, or excessive brusing  Neurological: No complaints of numbness, tingling, dizziness, weakness, seizures, headaches, syncope or fainting, AM fatigue, daytime sleepiness, no witnessed apnea episodes  Musculoskeletal: No complaints of arthritis, back pain, or painfull swelling  ROS Reviewed:    ROS reviewed  Active Problems   Problems    1  Acute combined systolic and diastolic congestive heart failure (428 41,428 0) (I50 41)   2  Acute pulmonary edema (518 4) (J81 0)   3  LEONARD (acute kidney injury) (584 9) (N17 9)   4  Arthritis of hand (716 94) (M19 049)   5  Asymptomatic carotid artery stenosis, bilateral (433 10,433 30) (I65 23)   6  At high risk for injury related to fall (V49 89) (Z91 81)   7  Atherosclerosis of artery of extremity with ulceration (440 23) (I70 299,L97 909)   8  Atherosclerosis of native artery of left lower extremity with rest pain (440 22) (I70 222)   9  Atherosclerotic heart disease of native coronary artery without angina pectoris (414 01)     (I25 10)   10  Atrial flutter (427 32) (I48 92)   11  Atrial flutter, unspecified type (427 32) (I48 92)   12  Benign essential HTN (401 1) (I10)   13  CAD (coronary artery disease) (414 00) (I25 10)   14  Current use of long term anticoagulation (V58 61) (Z79 01)   15  Degenerative joint disease involving multiple joints (715 89) (M15 9)   16  Depression screen (V79 0) (Z13 89)   17  Diabetes mellitus type 2, uncontrolled (250 02) (E11 65)   18  Diabetic retinopathy associated with diabetes mellitus of other type (250 50,362 01)      (E13 319)   19  Encounter for Medicare annual wellness exam (V70 0) (Z00 00)   20  Gastroesophageal reflux disease (530 81) (K21 9)   21  HLD (hyperlipidemia) (272 4) (E78 5)   22  Hypercholesterolemia (272 0) (E78 00)   23  Hyperkalemia (276 7) (E87 5)   24  Hypocalcemia (275 41) (E83 51)   25  Hypokalemia (276 8) (E87 6)   26  Hypomagnesemia (275 2) (E83 42)   27  Hypothyroidism (244 9) (E03 9)   28  LBBB (left bundle branch block) (426 3) (I44 7)   29  Migraine headache (346 90) (G43 909)   30  Minor head trauma (959 01) (S00 90XA)   31  Moderate mitral stenosis (394 0) (I05 0)   32  Nephrolithiasis (592 0) (N20 0)   33  Nonintractable episodic headache, unspecified headache type (784 0) (R51)   34  Nonrheumatic aortic valve stenosis (424 1) (I35 0)   35  NSTEMI (non-ST elevated myocardial infarction) (410 70) (I21 4)   36  Osteoarthritis of both knees, unspecified osteoarthritis type (715 96) (M17 0)   37  Osteoporosis screening (V82 81) (Z13 820)   38  Pain of left sacroiliac joint (724 6) (M53 3)   39  Peripheral arterial disease (443 9) (I73 9)   40  Precordial pain (786 51) (R07 2)   41  PVD (peripheral vascular disease) (443 9) (I73 9)   42  Screening for genitourinary condition (V81 6) (Z13 89)   43  Screening for neurological condition (V80 09) (Z13 89)   44  Severe aortic stenosis (424 1) (I35 0)   45  ST elevation myocardial infarction (STEMI) (410 90) (I21 3)   46  Subsequent non-ST elevation (NSTEMI) myocardial infarction (410 70) (I22 2)   47  Ulcer of toe of left foot (707 15) (L97 529)    Past Medical History   Problems    1  History of Abscess of groin (682 2) (L02 214)   2  History of Acute bronchitis (466 0) (J20 9)   3  History of Acute shoulder pain, right (719 41) (M25 511)   4  History of Acute upper respiratory infection (465 9) (J06 9)   5  History of Asthma (493 90) (J45 909)   6  History of Asthma with acute exacerbation (493 92) (J45 901)   7  History of Burn of shoulder, left, first degree, initial encounter   8  Denied: History of Carrier Of STD   9  History of Chest pain on breathing (786 52) (R07 1)   10  History of Chronic low back pain (724 2,338 29) (M54 5,G89 29)   11   History of Concussion With Loss Of Consciousness Of Unspecified Duration (850 5)   12  History of Coronary Artery Disease (V12 59)   13  History of Diabetes Mellitus (250 00)   14  History of Diabetes mellitus type 2, uncontrolled (250 02) (E11 65)   15  History of Diabetes mellitus type 2, uncontrolled (250 02) (E11 65)   16  History of Diabetic Peripheral Neuropathy (357 2)   17  History of Dyspnea on exertion (786 09) (R06 09)   18  History of Encounter for screening mammogram for malignant neoplasm of breast      (V76 12) (Z12 31)   19  Encounter for special screening examination for genitourinary disorder (V81 6) (Z13 89)   20  History of Fall from other slipping, tripping, or stumbling (E885 9) (W01  0XXA)   21  History of Flank pain (789 09) (R10 9)   22  History of Foreign Body In The Right Ear (931)   23  History of Functional murmur (R01 0)   24  History of Gross hematuria (599 71) (R31 0)   25  History of Hand pain, right (729 5) (M79 641)   26  History of Hand pain, unspecified laterality   27  History of abdominal pain (V13 89) (Z87 898)   28  History of acute bronchitis (V12 69) (Z87 09)   29  History of allergy (V15 09) (Z88 9)   30  History of asthma (V12 69) (Z87 09)   31  History of candidiasis (V12 09) (Z86 19)   32  History of cataract (V12 49) (Z86 69)   33  History of chronic fatigue syndrome (V13 89) (Z87 898)   34  History of herpes zoster (V12 09) (Z86 19)   35  History of hyperkalemia (V12 29) (Z86 39)   36  History of hyperlipidemia (V12 29) (Z86 39)   37  History of hypertension (V12 59) (Z86 79)   38  History of kidney stones (V13 01) (Z87 442)   39  History of Lyme disease (V12 09) (Z86 19)   40  History of nausea and vomiting (V12 79) (Z87 898)   41  History of pseudogout (V13 59) (Z87 39)   42  History of renal calculi (V13 01) (Z87 442)   43  History of spinal stenosis (V13 59) (Z87 39)   44  History of transient cerebral ischemia (V12 54) (Z86 73)   45  History of viral gastroenteritis (V12 09) (Z86 19)   46  History of Hypomagnesemia (275 2) (E83 42)   47  History of Irritable bowel syndrome (564 1) (K58 9)   48  History of Joint Pain In Both Knees (719 46)   49  History of Left hip pain (719 45) (M25 552)   50  History of Limb pain (729 5) (M79 609)   51  History of Lumbar radiculopathy (724 4) (M54 16)   52  History of Lyme disease (088 81) (A69 20)   53  Denied: History of Mental Status Change   54  History of Muscle spasm (728 85) (M62 838)   55  History of Neck pain (723 1) (M54 2)   56  History of Neck sprain, initial encounter (847 0) (S13 9XXA)   57  History of Non-neoplastic nevus (448 1) (I78 1)   58  History of Non-toxic multinodular goiter (241 1) (E04 2)   59  History of Osteoarthritis (715 90) (M19 90)   60  History of Other chronic pain (338 29) (G89 29)   61  History of Other nonspecific abnormal finding of lung field (793 19) (R91 8)   62  History of Pain and swelling of left lower leg (729 5,729 81) (M79 662,M79 89)   63  History of Palpitations (785 1) (R00 2)   64  History of Palpitations (785 1) (R00 2)   65  History of PPD screening test (V74 1) (Z11 1)   66  History of Reported Prior Kidney Disease (V13 00)   67  History of Soft Tissue Pain In Toes (729 5)   68  History of Strain of knee, left, initial encounter (844 9) (S86 912A)   69  History of Stroke Syndrome (436)   70  History of Toe fracture, left (826 0) (S92 912A)   71  History of Toe pain (729 5) (M79 676)   72  History of Trigger ring finger (727 03) (M65 349)   73  History of Type 2 diabetes mellitus (250 00) (E11 9)   74  History of Type 2 diabetes mellitus with hyperglycemia (250 00) (E11 65)   75  History of Urinary Symptoms (788 69)   76  History of UTI (urinary tract infection) (599 0) (N39 0)   77  History of UTI (urinary tract infection) (599 0) (N39 0)   78  History of UTI (urinary tract infection) (599 0) (N39 0)  Active Problems And Past Medical History Reviewed:     The active problems and past medical history were reviewed and updated today  Surgical History   Problems    1  History of Appendectomy   2  History of Cholecystectomy   3  History of Complete Colonoscopy   4  History of Coronary Artery Four Or More Arterial Bypass Grafts   5  History of Diagnostic Esophagogastroduodenoscopy   6  History of Gallbladder Surgery   7  History of Gallbladder Surgery   8  History of Hysterectomy   9  History of Laminectomy Lumbar   10  History of Thyroid Surgery Total Thyroidectomy  Surgical History Reviewed: The surgical history was reviewed and updated today  Family History   Mother    1  Family history of cancer (V16 9) (Z80 9)   2  Family history of Stroke Syndrome (V17 1)  Father    3  Family history of cancer (V16 9) (Z80 9)   4  Family history of heart disease (V17 49) (Z82 49)  Daughter    5  Family history of type 2 diabetes mellitus (V18 0) (Z83 3)  Sister    6  Family history of Breast Cancer (V16 3)  Family History    7  Family history of Family Health Status 5  Children Living  Family History Reviewed: The family history was reviewed and updated today  Social History   Problems    · Denied: History of Alcohol Use (History)   · Daily Coffee Consumption (1  Cups/Day)   · Daily Cola Consumption (1  Cans/Day)   · Daily Tea Consumption (1  Cups/Day)   · Denied: History of Drug Use   · Marital History -    · Never a smoker   · Occupation: Retired  Social History Reviewed: The social history was reviewed and updated today  The social history was reviewed and is unchanged  Current Meds    1  Asmanex 30 Metered Doses 220 MCG/INH Inhalation Aerosol Powder Breath Activated;     INHALE 2 PUFFS Daily; Therapy: 94TGA7776 to (Evaluate:77Yhg3038)  Requested for: 76Ozt9875; Last     Rx:28Vhr0674 Ordered   2  Aspirin 81 MG Oral Tablet Delayed Release; TAKE 1 TABLET DAILY; Therapy: (Recorded:28Bmj2138) to Recorded   3  Atorvastatin Calcium 80 MG Oral Tablet;  Take 1 tablet daily  Requested for: 45Jwk8242; Last Rx:28Dec2017 Ordered   4  Yesenia Contour Monitor w/Device Kit; USE AS DIRECTED; Therapy: 58Kbe0249 to (Last  [facility])  Requested for: 00Gtw4600 Ordered   5  Yesenia Contour Test In [employer]; TEST 4 TIMES DAILY; Therapy: 79BEU5998 to (Evaluate:28Jpc5577)  Requested for: 28Dec2017; Last     Rx:28Dec2017 Ordered   6  BD Insulin Syringe Ultrafine 31G X 5/16 1 ML Miscellaneous; USE AS DIRECTED      Requested for: 22Nov2017; Last Rx:22Nov2017 Ordered   7  Esomeprazole Magnesium 20 MG Oral Capsule Delayed Release; TAKE 1 CAPSULE     ONCE DAILY; Therapy: 71ZYX9174 to (Evaluate:26Jun2018)  Requested for: 28Dec2017; Last     Rx:28Dec2017 Ordered   8  Furosemide 40 MG Oral Tablet; Take 1 tablet daily  Requested for: 28Dec2017; Last     Rx:28Dec2017 Ordered   9  Lancets Ultra Fine Miscellaneous; test glucose three times a day before each meal;     Therapy: 41RVF3623 to (Last Rx:02Nov2017)  Requested for: 39SNA1346 Ordered   10  Lantus 100 UNIT/ML Subcutaneous Solution; Inject 30 in am and 30 q pm  units      subcutaneously; Therapy: 06AQX7037 to (Last Rx:26Oct2017)  Requested for: 26Oct2017 Ordered   11  Levothyroxine Sodium 100 MCG Oral Tablet; Take 1 tablet daily  Requested for:      74Agx3588; Last Rx:79Ssa3880 Ordered   12  Lisinopril 5 MG Oral Tablet; Take 1 tablet daily  Requested for: 04Pmg6368; Last      Rx:18Wqn0582 Ordered   13  Magnesium Oxide 400 MG Oral Capsule; Take 1 capsule twice daily; Therapy: 60Ebi6935 to (Evaluate:26Jun2018)  Requested for: 43Uka4245; Last      Rx:16Bdu8560 Ordered   14  MetFORMIN HCl - 1000 MG Oral Tablet; than one bid; Therapy: 70PNQ7129 to (Evaluate:26Jun2018)  Requested for: 28Dec2017; Last      Rx:36Rgq5836 Ordered   15  Metoprolol Succinate  MG Oral Tablet Extended Release 24 Hour; TAKE 1 TABLET      Bedtime  Requested for: 28Dec2017; Last Rx:93Ftn3519 Ordered   16   Metoprolol Succinate ER 50 MG Oral Tablet Extended Release 24 Hour; take 1 tablet      daily in am;      Therapy: 35Avz2458 to (Evaluate:26Jun2018)  Requested for: 02Szj4171; Last      Rx:38Abf9437 Ordered   17  Nitroglycerin 0 4 MG Sublingual Tablet Sublingual; PLACE 1 TABLET UNDER THE      TONGUE EVERY 5 MINUTES FOR UP TO 3 DOSES AS NEEDED FOR CHEST      PAIN  CALL 911 IF PAIN PERSISTS; Therapy: 50HPJ8585 to (Evaluate:08Apr2018)  Requested for: 64Blb5713; Last      Rx:86Foo4169 Ordered   18  Tums CHEW; PRN; Therapy: (Recorded:01Abc7112) to Recorded   19  Tylenol 325 MG Oral Tablet; TAKE 1 TO 2 TABLETS EVERY 4 HOURS AS NEEDED; Therapy: (Recorded:86Vdn3177) to Recorded   20  Ventolin  (90 Base) MCG/ACT Inhalation Aerosol Solution; INHALE 1-2 PUFFS      EVERY 4-6 HOURS AS NEEDED AND AS DIRECTED  Requested for: 29Yzh3348; Last      Rx:71Wpu4511 Ordered   21  Vitamin D3 1000 UNIT Oral Tablet Chewable; CHEW 1 TABLET DAILY; Therapy: 57GAT1920 to (Last Rx:97Wot0869)  Requested for: 46OYR4693 Ordered   22  Warfarin Sodium 3 MG Oral Tablet; Take 2 tablets on Tuesday, Thursday and 1 tablet all      other days; Therapy: 07ETA3891 to (Last [facility])  Requested for: 53Xbj8537 Ordered  Medication List Reviewed: The medication list was reviewed and updated today  Allergies   Medication    1  Cephalosporins   2  Doxycycline Hyclate CAPS   3  Doxycycline Monohydrate CAPS   4  Levaquin TABS   5  Toradol SOLN  Non-Medication    6  IVP Dye    Vitals   Vital Signs    Recorded: 26IJE0381 09:40AM   Heart Rate 83   Systolic 902, RUE, Sitting   Diastolic 62, RUE, Sitting   Height 5 ft 4 in   Weight 171 lb    BMI Calculated 29 35   BSA Calculated 1 83     Physical Exam        Constitutional      General appearance: No acute distress, well appearing and well nourished  Eyes      Conjunctiva and Sclera examination: Conjunctiva pink, sclera anicteric         Ears, Nose, Mouth, and Throat - Oropharynx: Clear, nares are clear, mucous membranes are moist       Neck Neck and thyroid: Normal, supple, trachea midline, no thyromegaly  Pulmonary      Respiratory effort: No increased work of breathing or signs of respiratory distress  Auscultation of lungs: Clear to auscultation, no rales, no rhonchi, no wheezing, good air movement  Cardiovascular      Auscultation of heart: Abnormal   The heart rate was normal  The rhythm was regular  Heart sounds: abnormal S2 was diminished, but-- normal S1-- and-- no gallop heard  A grade 3 systolic murmur was heard at the RUSB  Carotid pulses: Normal, 2+ bilaterally  Peripheral vascular exam: Normal pulses throughout, no tenderness, erythema or swelling  Pedal pulses: Abnormal   Posterior tibialis pulse was 1+ on the right-- and-- 1+ on the left  Examination of extremities for edema and/or varicosities: Normal        Abdomen      Abdomen: Non-tender and no distention  Liver and spleen: No hepatomegaly or splenomegaly  Musculoskeletal Gait and station: Normal gait  -- Digits and nails: Normal without clubbing or cyanosis  -- Inspection/palpation of joints, bones, and muscles: Normal, ROM normal        Skin - Skin and subcutaneous tissue: Normal without rashes or lesions  Skin is warm and well perfused, normal turgor  Neurologic - Cranial nerves: II - XII intact  -- Speech: Normal        Psychiatric - Orientation to person, place, and time: Normal -- Mood and affect: Normal       Health Management   Encounter for special screening examination for genitourinary disorder   *VB-Depression Screening; every 1 year; Last 02OPG2568; Next Due: 12Jan2019; Active  History of Depression screening   PHevery-9 Adult Depression Screening; every 1 year; Last 22WWR8378; Next Due: 92RXM2995; Overdue  History of Diabetes mellitus type 2, uncontrolled   *VB - Foot Exam; every 1 year; Last 22Nov2017; Next Due: 22Nov2018;  Active  History of Encounter for screening mammogram for malignant neoplasm of breast Digital Bilateral Screening Mammogram With CAD; every 1 year; Last 14FLY6965; Next Due:    78IOS1677; Overdue  History of Type 2 diabetes mellitus with hyperglycemia   *VB - Eye Exam; every 1 year; Last 10JCW2928; Next Due: 85Cmb7331; Overdue  Screening for genitourinary condition   *VB - Urinary Incontinence Screen (Dx Z13 89 Screen for UI); every 1 year; Last 49RDC9501; Next    Due: 12Jan2019; Active  Screening for neurological condition   *VB - Fall Risk Assessment  (Dx Z13 89 Screen for Neurologic Disorder); every 1 year; Last    09GXC7892; Next Due: 12Jan2019;  Active    Future Appointments      Date/Time Provider Specialty Site   01/26/2018 02:15 PM Kristal Vera MD Internal Medicine [facility]     Signatures    Electronically signed by : SHANEKA Villagran ; Sharath 15 2018 10:12AM EST                       (Author)

## 2018-01-19 ENCOUNTER — LAB REQUISITION (OUTPATIENT)
Dept: LAB | Facility: HOSPITAL | Age: 78
End: 2018-01-19
Payer: MEDICARE

## 2018-01-19 ENCOUNTER — GENERIC CONVERSION - ENCOUNTER (OUTPATIENT)
Dept: OTHER | Facility: OTHER | Age: 78
End: 2018-01-19

## 2018-01-19 DIAGNOSIS — I48.92 ATRIAL FLUTTER (HCC): ICD-10-CM

## 2018-01-19 LAB
INR PPP: 1.73 (ref 0.86–1.16)
PROTHROMBIN TIME: 20.4 SECONDS (ref 12.1–14.4)

## 2018-01-19 PROCEDURE — 85610 PROTHROMBIN TIME: CPT | Performed by: INTERNAL MEDICINE

## 2018-01-22 VITALS
OXYGEN SATURATION: 98 % | HEIGHT: 65 IN | SYSTOLIC BLOOD PRESSURE: 120 MMHG | TEMPERATURE: 98.7 F | BODY MASS INDEX: 29.18 KG/M2 | DIASTOLIC BLOOD PRESSURE: 74 MMHG | WEIGHT: 175.13 LBS | HEART RATE: 68 BPM

## 2018-01-22 VITALS
WEIGHT: 171 LBS | HEART RATE: 83 BPM | HEIGHT: 64 IN | BODY MASS INDEX: 29.19 KG/M2 | DIASTOLIC BLOOD PRESSURE: 62 MMHG | SYSTOLIC BLOOD PRESSURE: 108 MMHG

## 2018-01-22 VITALS
HEART RATE: 87 BPM | OXYGEN SATURATION: 97 % | SYSTOLIC BLOOD PRESSURE: 118 MMHG | BODY MASS INDEX: 30.22 KG/M2 | TEMPERATURE: 99.2 F | WEIGHT: 177 LBS | DIASTOLIC BLOOD PRESSURE: 68 MMHG | HEIGHT: 64 IN

## 2018-01-22 VITALS
BODY MASS INDEX: 29.06 KG/M2 | WEIGHT: 170.25 LBS | HEIGHT: 64 IN | HEART RATE: 84 BPM | SYSTOLIC BLOOD PRESSURE: 118 MMHG | DIASTOLIC BLOOD PRESSURE: 72 MMHG | OXYGEN SATURATION: 98 % | TEMPERATURE: 98.2 F

## 2018-01-23 NOTE — PROCEDURES
Procedures by Krissy Rodriguez MD at 12/19/2017   9:09 AM      Author:  Cassandra Monsivais MD Service:  Vascular Surgery Author Type:  Physician    Filed:  12/19/2017  9:10 AM Date of Service:  12/19/2017  9:09 AM Status:  Signed    :  Cassandra Monsivais MD (Physician)         Procedures    12/19/17    PreopDx: atherosclerosis with L great toe wound    Postop Dx: severe tibial disease    Procedure:  R CFA access w/ 5F sheath and manual closure  AOG with LLE runoff    Indications: Pt is a 77 yo F w/ PAD and DM who presented with nonhealing L great toe wound  Noncompressible SHADI with MTP/GTP not predictive for healing in a diabetic  Presents for intervention      Anesthesia: Conscious sedation with 1% lidocaine    Physician: Krissy Rodriguez MD           Received for:Aimee Monsivais MD  Dec 19 2017  9:11AM Eastern Standard Time

## 2018-01-23 NOTE — MISCELLANEOUS
Message  [facility] called re: pt, they are calling rapid response  they had called prev and wanted to s/w Dr Aria Rene, she states she paged her  they called back a second time but hung up before she could transf them  called Noah Castro PA-C, she is at hospital and is aware of situation and is on her way to see pt  paged Dr Aira Rene to notify of same  Active Problems    1  Acute combined systolic and diastolic congestive heart failure (428 41,428 0) (I50 41)   2  Arthritis of hand (716 94) (M19 049)   3  Asymptomatic carotid artery stenosis, bilateral (433 10,433 30) (I65 23)   4  At high risk for injury related to fall (V49 89) (Z91 89)   5  Atherosclerosis of artery of extremity with ulceration (440 23) (I70 299,L97 909)   6  Atherosclerosis of native artery of left lower extremity with rest pain (440 22) (I70 222)   7  Atherosclerotic heart disease of native coronary artery without angina pectoris (414 01)   (I25 10)   8  Atrial flutter (427 32) (I48 92)   9  Benign essential HTN (401 1) (I10)   10  Current use of long term anticoagulation (V58 61) (Z79 01)   11  Degenerative joint disease involving multiple joints (715 89) (M15 9)   12  Diabetes mellitus type 2, uncontrolled (250 02) (E11 65)   13  Diabetic retinopathy associated with diabetes mellitus of other type (250 50,362 01)    (E13 319)   14  Gastroesophageal reflux disease (530 81) (K21 9)   15  Hypercholesterolemia (272 0) (E78 00)   16  Hyperkalemia (276 7) (E87 5)   17  Hypocalcemia (275 41) (E83 51)   18  Hypokalemia (276 8) (E87 6)   19  Hypomagnesemia (275 2) (E83 42)   20  Hypothyroidism (244 9) (E03 9)   21  LBBB (left bundle branch block) (426 3) (I44 7)   22  Migraine headache (346 90) (G43 909)   23  Minor head trauma (959 01) (S00 90XA)   24  Nephrolithiasis (592 0) (N20 0)   25  Nonintractable episodic headache, unspecified headache type (784 0) (R51)   26  Nonrheumatic aortic valve stenosis (424 1) (I35 0)   27  Osteoarthritis of both knees, unspecified osteoarthritis type (715 96) (M17 0)   28  Pain of left sacroiliac joint (724 6) (M53 3)   29  Peripheral arterial disease (443 9) (I73 9)   30  Precordial pain (786 51) (R07 2)   31  PVD (peripheral vascular disease) (443 9) (I73 9)   32  Screening for genitourinary condition (V81 6) (Z13 89)   33  Screening for neurological condition (V80 09) (Z13 89)   34  ST elevation myocardial infarction (STEMI) (410 90) (I21 3)   35  Subsequent non-ST elevation (NSTEMI) myocardial infarction (410 70) (I22 2)   36  Ulcer of toe of left foot (707 15) (L97 529)    Current Meds   1  Asmanex 30 Metered Doses 220 MCG/INH Inhalation Aerosol Powder Breath Activated;   INHALE 1 PUFFS Daily; Therapy: 07NMN9600 to (Last Rx:34Oqf3367)  Requested for: 21Oct2016 Ordered   2  Aspirin 81 MG Oral Tablet Delayed Release; TAKE 1 TABLET DAILY; Therapy: (Recorded:77Vvr2132) to Recorded   3  Atorvastatin Calcium 80 MG Oral Tablet; Take 1 tablet daily  Requested for: 28PLX9016;   Last Rx:26Oct2017 Ordered   4  Yesenia Contour Monitor w/Device Kit; USE AS DIRECTED; Therapy: 98Idd2753 to (Last Christygisselle Medeiros)  Requested for: 61Pnu6729 Ordered   5  Yesenia Contour Test In [employer]; TEST 4 TIMES DAILY; Therapy: 86BFQ4336 to (0472 94 41 68)  Requested for: 26Oct2017; Last   Rx:26Oct2017 Ordered   6  BD Insulin Syringe Ultrafine 31G X 5/16" 1 ML Miscellaneous; USE AS DIRECTED    Requested for: 22Nov2017; Last Rx:22Nov2017 Ordered   7  Benadryl Allergy 25 MG Oral Tablet; take 2 tabs 1 hour prior to contrast;   Therapy: 06UQE1537 to (Last Rx:16Nov2017)  Requested for: 73BZK8270 Ordered   8  Esomeprazole Magnesium 20 MG Oral Capsule Delayed Release; TAKE 1 CAPSULE   ONCE DAILY; Therapy: 45RZQ1495 to (Evaluate:05Ghz0384)  Requested for: 26Oct2017; Last   Rx:26Oct2017 Ordered   9  Furosemide 40 MG Oral Tablet; Take 1 tablet daily  Requested for: 97HDM1634; Last   Rx:26Oct2017 Ordered   10   Lancets Ultra Fine Miscellaneous; test glucose three times a day before each meal;    Therapy: 80CET2364 to (Last Rx:02Nov2017)  Requested for: 27MMY9759 Ordered   11  Lantus 100 UNIT/ML Subcutaneous Solution; Inject 30 in am and 30 q pm  units    subcutaneously; Therapy: 58DGD6228 to (Last Rx:26Oct2017)  Requested for: 26Oct2017 Ordered   12  Levothyroxine Sodium 100 MCG Oral Tablet; Take 1 tablet daily  Requested for:    21FBB0462; Last Rx:18Oct2017 Ordered   13  Lisinopril 5 MG Oral Tablet; Take 1 tablet daily  Requested for: 98KFZ7592; Last    Rx:26Oct2017 Ordered   14  Magnesium Oxide 400 MG Oral Capsule; Take 1 capsule twice daily; Therapy: 97Dyh7462 to (Last Rx:02Jun2017)  Requested for: 63Gvb3484 Ordered   15  MetFORMIN HCl - 1000 MG Oral Tablet; than one bid; Therapy: 77PCM1623 to (Evaluate:24Apr2018)  Requested for: 26Oct2017; Last    Rx:26Oct2017 Ordered   16  MethylPREDNISolone 32 MG Oral Tablet; 32mg po 12 hours prior to contrast and 32mg    po 2 hours prior to contast;    Therapy: 73SXL6335 to (Last Rx:16Nov2017)  Requested for: 84HRH6229 Ordered   17  Metoprolol Succinate  MG Oral Tablet Extended Release 24 Hour; TAKE 1    TABLET Bedtime  Requested for: 45NGB8161; Last Rx:17Mar2017 Ordered   18  Metoprolol Succinate ER 50 MG Oral Tablet Extended Release 24 Hour; take 1 tablet    daily in am;    Therapy: 79Fam2509 to (Evaluate:45Zll8428)  Requested for: 33RMQ0704 Recorded   19  Tums CHEW; PRN; Therapy: (Recorded:89Pot5254) to Recorded   20  Tylenol 325 MG Oral Tablet; TAKE 1 TO 2 TABLETS EVERY 4 HOURS AS NEEDED; Therapy: (Recorded:79Ezj3465) to Recorded   21  Ventolin  (90 Base) MCG/ACT Inhalation Aerosol Solution; INHALE 1-2 PUFFS    EVERY 4-6 HOURS AS NEEDED AND AS DIRECTED  Requested for: 90HFH4275; Last    Rx:26Oct2017 Ordered   22  Vision Vitamins Oral Tablet; Take 1 tablet daily; Therapy: 37Mco7177 to Recorded   23   Vitamin D3 1000 UNIT Oral Tablet Chewable; CHEW 1 TABLET DAILY; Therapy: 95DTR4693 to (Last Rx:75Ice1187)  Requested for: 79MLA7192 Ordered   24  Warfarin Sodium 3 MG Oral Tablet; Take 2 tablets on Tuesday and 1 tablet all other days; Therapy: 41ZDX3142 to (Last Rx:12Oct2017)  Requested for: 23Oct2017 Ordered    Allergies    1  Cephalosporins   2  Doxycycline Hyclate CAPS   3  Doxycycline Monohydrate CAPS   4  Levaquin TABS   5  Toradol SOLN    6   IVP Dye    Signatures   Electronically signed by : Kolby Santana, ; Dec 19 2017  2:10PM EST                       (Author)

## 2018-01-24 VITALS
BODY MASS INDEX: 29.05 KG/M2 | WEIGHT: 170.13 LBS | OXYGEN SATURATION: 93 % | HEIGHT: 64 IN | SYSTOLIC BLOOD PRESSURE: 122 MMHG | TEMPERATURE: 98.2 F | DIASTOLIC BLOOD PRESSURE: 70 MMHG | HEART RATE: 79 BPM

## 2018-01-24 VITALS
RESPIRATION RATE: 14 BRPM | HEIGHT: 64 IN | HEART RATE: 72 BPM | DIASTOLIC BLOOD PRESSURE: 70 MMHG | WEIGHT: 171 LBS | BODY MASS INDEX: 29.19 KG/M2 | SYSTOLIC BLOOD PRESSURE: 128 MMHG | TEMPERATURE: 97.6 F

## 2018-01-25 ENCOUNTER — CLINICAL SUPPORT (OUTPATIENT)
Dept: INTERNAL MEDICINE CLINIC | Facility: CLINIC | Age: 78
End: 2018-01-25
Payer: MEDICARE

## 2018-01-25 ENCOUNTER — APPOINTMENT (OUTPATIENT)
Dept: LAB | Facility: HOSPITAL | Age: 78
End: 2018-01-25
Attending: INTERNAL MEDICINE
Payer: MEDICARE

## 2018-01-25 DIAGNOSIS — I48.92 ATRIAL FLUTTER, UNSPECIFIED TYPE (HCC): ICD-10-CM

## 2018-01-25 DIAGNOSIS — I48.92 ATRIAL FLUTTER, UNSPECIFIED TYPE (HCC): Primary | ICD-10-CM

## 2018-01-25 LAB
INR PPP: 2.86 (ref 0.86–1.16)
PROTHROMBIN TIME: 30.4 SECONDS (ref 12.1–14.4)

## 2018-01-25 PROCEDURE — 99999 PR OFFICE/OUTPT VISIT,PROCEDURE ONLY: CPT | Performed by: INTERNAL MEDICINE

## 2018-01-25 PROCEDURE — 85610 PROTHROMBIN TIME: CPT | Performed by: INTERNAL MEDICINE

## 2018-01-25 PROCEDURE — 99999 PR OFFICE/OUTPT VISIT,PROCEDURE ONLY: CPT

## 2018-01-25 PROCEDURE — 36415 COLL VENOUS BLD VENIPUNCTURE: CPT

## 2018-01-25 PROCEDURE — 36415 COLL VENOUS BLD VENIPUNCTURE: CPT | Performed by: INTERNAL MEDICINE

## 2018-01-26 ENCOUNTER — OFFICE VISIT (OUTPATIENT)
Dept: INTERNAL MEDICINE CLINIC | Facility: CLINIC | Age: 78
End: 2018-01-26
Payer: MEDICARE

## 2018-01-26 ENCOUNTER — ANTICOAG VISIT (OUTPATIENT)
Dept: INTERNAL MEDICINE CLINIC | Age: 78
End: 2018-01-26

## 2018-01-26 VITALS
BODY MASS INDEX: 28.62 KG/M2 | SYSTOLIC BLOOD PRESSURE: 116 MMHG | OXYGEN SATURATION: 97 % | HEART RATE: 57 BPM | TEMPERATURE: 98 F | DIASTOLIC BLOOD PRESSURE: 70 MMHG | WEIGHT: 171.8 LBS | HEIGHT: 65 IN

## 2018-01-26 DIAGNOSIS — IMO0002 DM (DIABETES MELLITUS) TYPE II UNCONTROLLED, PERIPH VASCULAR DISORDER: ICD-10-CM

## 2018-01-26 DIAGNOSIS — I05.0 MODERATE MITRAL STENOSIS: ICD-10-CM

## 2018-01-26 DIAGNOSIS — I73.9 PAD (PERIPHERAL ARTERY DISEASE) (HCC): ICD-10-CM

## 2018-01-26 DIAGNOSIS — I50.41 ACUTE COMBINED SYSTOLIC AND DIASTOLIC CONGESTIVE HEART FAILURE (HCC): ICD-10-CM

## 2018-01-26 DIAGNOSIS — J45.20 MILD INTERMITTENT ASTHMA WITHOUT COMPLICATION: Primary | ICD-10-CM

## 2018-01-26 DIAGNOSIS — I10 ESSENTIAL HYPERTENSION: ICD-10-CM

## 2018-01-26 DIAGNOSIS — I48.91 ATRIAL FIBRILLATION, UNSPECIFIED TYPE (HCC): Primary | ICD-10-CM

## 2018-01-26 DIAGNOSIS — I35.0 SEVERE AORTIC STENOSIS: ICD-10-CM

## 2018-01-26 PROBLEM — N17.9 AKI (ACUTE KIDNEY INJURY) (HCC): Status: RESOLVED | Noted: 2017-12-19 | Resolved: 2018-01-26

## 2018-01-26 PROBLEM — J81.0 ACUTE PULMONARY EDEMA (HCC): Status: RESOLVED | Noted: 2017-12-19 | Resolved: 2018-01-26

## 2018-01-26 PROBLEM — I21.4 NSTEMI (NON-ST ELEVATED MYOCARDIAL INFARCTION) (HCC): Status: RESOLVED | Noted: 2017-12-19 | Resolved: 2018-01-26

## 2018-01-26 PROCEDURE — 99214 OFFICE O/P EST MOD 30 MIN: CPT | Performed by: INTERNAL MEDICINE

## 2018-01-26 RX ORDER — INSULIN GLARGINE 100 [IU]/ML
30 INJECTION, SOLUTION SUBCUTANEOUS
Refills: 3 | COMMUNITY
Start: 2017-10-31 | End: 2018-03-28 | Stop reason: SDUPTHER

## 2018-01-26 RX ORDER — CALCIUM CARBONATE 200(500)MG
2 TABLET,CHEWABLE ORAL 3 TIMES DAILY
COMMUNITY
End: 2018-09-07

## 2018-01-26 NOTE — ASSESSMENT & PLAN NOTE
Patient had not have any blood workup for diabetes recently we will order the blood workup fasting blood sugars are good less than 100 but nighttime blood sugars are over 200

## 2018-01-26 NOTE — ASSESSMENT & PLAN NOTE
SHE IS COMPLAINING OF PAIN AND BURNING ON THE BOTH FEET on physical examination of the feet there is a redness on the most lower part of the both feet and dorsalis pedis and posterior tibials are not palpable I recommended that she should see the vascular surgeon as soon as possible she is followed up by the vascular at [facility]

## 2018-01-30 NOTE — MISCELLANEOUS
Assessment   1  Acute combined systolic and diastolic congestive heart failure (428 41,428 0) (I50 41)1   2  Atrial flutter (427 32) (I48 92)1   3  Acute pulmonary edema (518 4) (J81 0)1      1 Amended By: Ivon Frank; Dec 28 2017 4:50 PM EST    Discussion/Summary  Counseling Documentation With Imm: The  patient1  was counseled regarding1  diagnostic results1 , prognosis1 , impressions1 , risks and benefits of treatment options1         1 Amended By: Ivon Frank; Dec 28 2017 4:55 PM EST    Chief Complaint  Chief Complaint Free Text Note Form: Patient is here for a SUSIE visit after hospitalization at [facility] 12/19/2017 to 12/21/2017 for acute pulmonary edema and CHF  She needs a refill of all meds  and a new RX for Nitroglycerin  1        1 Amended By: Josue Miller; Dec 28 2017 4:16 PM EST    History of Present Illness  TCM Communication [facility]: The patient is being contacted for follow-up after hospitalization  Hospital  course was discussed with the inpatient physician1  ,  records were reviewed1  and  Patient was admitted to the [address] [place] [address] with acute pulmonary edema was seen by the cardiologist patient is scheduled to see the cardiologist again for possible evaluation for aortic valve replacement1  1   She was hospitalized at [facility]  The date of admission: 12/19/2017, date of discharge: 12/21/17 ,  I reviewed the discharge summary hospital course and discharge medication1   Diagnosis: acute pulmonary edema, PAD, HTN, NSTEMI, severe aortic stenosis, CAD, DMII, atrial flutter, HLD, GERD, moderate mitral stenosis, LEONARD  She was discharged to home  Medications reviewed and updated today  She scheduled a follow up appointment  Follow-up appointments with other specialists: 1/4/18 Dr Monsivais and Dr Shanti Ford 1/15/18  Symptoms: headache  The patient is currently experiencing symptoms   Referrals Needed:  SUSIE scheduled with Dr Li Rdz in [facility] office on 12/28/17 at 415PM  Counseling was provided to the patient  Topics counseled included 1  diagnostic results1 , prognosis1  and activities of daily living1   Communication performed and completed by brooke   Congestive Heart Failure Continuity of Care Pathway: The patient is being seen for  a routine clinic follow-up of1  congestive heart failure1   The patient has previously been diagnosed with  acute1  ,  diastolic1  heart failure1   Symptoms: 1  dyspnea on exertion1   No associated symptoms are reported1                                                              Atrial Flutter (Brief): The patient is being seen for  a routine clinic follow-up of1  atrial flutter1   The patient is currently asymptomatic1        1 Amended By: Yi ; Dec 28 2017 4:55 PM EST    Review of Systems  Complete-Female:   Constitutional:1  No fever, no chills, feels well, no tiredness, no recent weight gain or weight loss1   Eyes:1  No complaints of eye pain, no red eyes, no eyesight problems, no discharge, no dry eyes, no itching of eyes1   ENT:1  no complaints of earache, no loss of hearing, no nose bleeds, no nasal discharge, no sore throat, no hoarseness1   Cardiovascular:1  palpitations1   Respiratory:1  shortness of breath during exertion1   Gastrointestinal:1  No complaints of abdominal pain, no constipation, no nausea or vomiting, no diarrhea, no bloody stools1   Genitourinary:1  No complaints of dysuria, no incontinence, no pelvic pain, no dysmenorrhea, no vaginal discharge or bleeding1   Musculoskeletal:1  No complaints of arthralgias, no myalgias, no joint swelling or stiffness, no limb pain or swelling1   Integumentary:1  No complaints of skin rash or lesions, no itching, no skin wounds, no breast pain or lump1   The patient presents with complaints of gradual onset of moderate bilateral temporal and bilateral occipital headache, described as sharp  1      Endocrine:1  No complaints of proptosis, no hot flashes, no muscle weakness, no deepening of the voice, no feelings of weakness1         1 Amended By: Minerva Rodas; Dec 28 2017 4:53 PM EST    Active Problems   1  Acute combined systolic and diastolic congestive heart failure (428 41,428 0) (I50 41)  2  Acute pulmonary edema (518 4) (J81 0)  3  LEONARD (acute kidney injury) (584 9) (N17 9)  4  Arthritis of hand (716 94) (M19 049)  5  Asymptomatic carotid artery stenosis, bilateral (433 10,433 30) (I65 23)  6  At high risk for injury related to fall (V49 89) (Z91 89)  7  Atherosclerosis of artery of extremity with ulceration (440 23) (I70 299,L97 909)  8  Atherosclerosis of native artery of left lower extremity with rest pain (440 22) (I70 222)  9  Atherosclerotic heart disease of native coronary artery without angina pectoris (414 01)   (I25 10)  10  Atrial flutter (427 32) (I48 92)  11  Benign essential HTN (401 1) (I10)  12  CAD (coronary artery disease) (414 00) (I25 10)  13  Current use of long term anticoagulation (V58 61) (Z79 01)  14  Degenerative joint disease involving multiple joints (715 89) (M15 9)  15  Diabetes mellitus type 2, uncontrolled (250 02) (E11 65)  16  Diabetic retinopathy associated with diabetes mellitus of other type (250 50,362 01)    (E13 319)  17  Gastroesophageal reflux disease (530 81) (K21 9)  18  HLD (hyperlipidemia) (272 4) (E78 5)  19  Hypercholesterolemia (272 0) (E78 00)  20  Hyperkalemia (276 7) (E87 5)  21  Hypocalcemia (275 41) (E83 51)  22  Hypokalemia (276 8) (E87 6)  23  Hypomagnesemia (275 2) (E83 42)  24  Hypothyroidism (244 9) (E03 9)  25  LBBB (left bundle branch block) (426 3) (I44 7)  26  Migraine headache (346 90) (G43 909)  27  Minor head trauma (959 01) (S00 90XA)  28  Moderate mitral stenosis (394 0) (I05 0)  29  Nephrolithiasis (592 0) (N20 0)  30  Nonintractable episodic headache, unspecified headache type (784 0) (R51)  31  Nonrheumatic aortic valve stenosis (424 1) (I35 0)  32   NSTEMI (non-ST elevated myocardial infarction) (410 70) (I21 4)  33  Osteoarthritis of both knees, unspecified osteoarthritis type (715 96) (M17 0)  34  Pain of left sacroiliac joint (724 6) (M53 3)  35  Peripheral arterial disease (443 9) (I73 9)  36  Precordial pain (786 51) (R07 2)  37  PVD (peripheral vascular disease) (443 9) (I73 9)  38  Screening for genitourinary condition (V81 6) (Z13 89)  39  Screening for neurological condition (V80 09) (Z13 89)  40  Severe aortic stenosis (424 1) (I35 0)  41  ST elevation myocardial infarction (STEMI) (410 90) (I21 3)  42  Subsequent non-ST elevation (NSTEMI) myocardial infarction (410 70) (I22 2)  43  Ulcer of toe of left foot (707 15) (L97 529)    Past Medical History   1  History of Abscess of groin (682 2) (L02 214)  2  History of Acute bronchitis (466 0) (J20 9)  3  History of Acute shoulder pain, right (719 41) (M25 511)  4  History of Acute upper respiratory infection (465 9) (J06 9)  5  History of Asthma (493 90) (J45 909)  6  History of Asthma with acute exacerbation (493 92) (J45 901)  7  History of Burn of shoulder, left, first degree, initial encounter  8  Denied: History of Carrier Of STD  9  History of Chest pain on breathing (786 52) (R07 1)  10  History of Chronic low back pain (724 2,338 29) (M54 5,G89 29)  11  History of Concussion With Loss Of Consciousness Of Unspecified Duration (850 5)  12  History of Coronary Artery Disease (V12 59)  13  History of Depression screen (V79 0) (Z13 89)  14  History of Diabetes Mellitus (250 00)  15  History of Diabetes mellitus type 2, uncontrolled (250 02) (E11 65)  16  History of Diabetes mellitus type 2, uncontrolled (250 02) (E11 65)  17  History of Diabetic Peripheral Neuropathy (357 2)  18  History of Dyspnea on exertion (786 09) (R06 09)  19  History of Encounter for screening mammogram for malignant neoplasm of breast    (V76 12) (Z12 31)  20  Encounter for special screening examination for genitourinary disorder (V81 6) (Z13 89)  21   History of Fall from other slipping, tripping, or stumbling (E885 9) (W01  0XXA)  22  History of Flank pain (789 09) (R10 9)  23  History of Foreign Body In The Right Ear (931)  24  History of Functional murmur (R01 0)  25  History of Gross hematuria (599 71) (R31 0)  26  History of Hand pain, right (729 5) (M79 641)  27  History of Hand pain, unspecified laterality  28  History of abdominal pain (V13 89) (Z87 898)  29  History of acute bronchitis (V12 69) (Z87 09)  30  History of allergy (V15 09) (Z88 9)  31  History of asthma (V12 69) (Z87 09)  32  History of atrial flutter (V12 59) (Z86 79)  33  History of candidiasis (V12 09) (Z86 19)  34  History of cataract (V12 49) (Z86 69)  35  History of chronic fatigue syndrome (V13 89) (Z87 898)  36  History of herpes zoster (V12 09) (Z86 19)  37  History of hyperkalemia (V12 29) (Z86 39)  38  History of hyperlipidemia (V12 29) (Z86 39)  39  History of hypertension (V12 59) (Z86 79)  40  History of kidney stones (V13 01) (Z87 442)  41  History of Lyme disease (V12 09) (Z86 19)  42  History of nausea and vomiting (V12 79) (Z87 898)  43  History of pseudogout (V13 59) (Z87 39)  44  History of renal calculi (V13 01) (Z87 442)  45  History of spinal stenosis (V13 59) (Z87 39)  46  History of transient cerebral ischemia (V12 54) (Z86 73)  47  History of viral gastroenteritis (V12 09) (Z86 19)  48  History of Hypomagnesemia (275 2) (E83 42)  49  History of Irritable bowel syndrome (564 1) (K58 9)  50  History of Joint Pain In Both Knees (719 46)  51  History of Left hip pain (719 45) (M25 552)  52  History of Limb pain (729 5) (M79 609)  53  History of Lumbar radiculopathy (724 4) (M54 16)  54  History of Lyme disease (088 81) (A69 20)  55  Denied: History of Mental Status Change  56  History of Muscle spasm (728 85) (M62 838)  57  History of Neck pain (723 1) (M54 2)  58  History of Neck sprain, initial encounter (847 0) (S13 9XXA)  59  History of Non-neoplastic nevus (448 1) (I78 1)  60   History of Non-toxic multinodular goiter (241 1) (E04 2)  61  History of Osteoarthritis (715 90) (M19 90)  62  History of Other chronic pain (338 29) (G89 29)  63  History of Other nonspecific abnormal finding of lung field (793 19) (R91 8)  64  History of Pain and swelling of left lower leg (729 5,729 81) (M79 662,M79 89)  65  History of Palpitations (785 1) (R00 2)  66  History of Palpitations (785 1) (R00 2)  67  History of PPD screening test (V74 1) (Z11 1)  68  History of Reported Prior Kidney Disease (V13 00)  69  History of Soft Tissue Pain In Toes (729 5)  70  History of Strain of knee, left, initial encounter (844 9) (S86 912A)  71  History of Stroke Syndrome (436)  72  History of Toe fracture, left (826 0) (S92 912A)  73  History of Toe pain (729 5) (M79 676)  74  History of Trigger ring finger (727 03) (M65 349)  75  History of Type 2 diabetes mellitus (250 00) (E11 9)  76  History of Type 2 diabetes mellitus with hyperglycemia (250 00) (E11 65)  77  History of Urinary Symptoms (788 69)  78  History of UTI (urinary tract infection) (599 0) (N39 0)  79  History of UTI (urinary tract infection) (599 0) (N39 0)  80  History of UTI (urinary tract infection) (599 0) (N39 0)    Surgical History   1  History of Appendectomy  2  History of Cholecystectomy  3  History of Complete Colonoscopy  4  History of Coronary Artery Four Or More Arterial Bypass Grafts  5  History of Diagnostic Esophagogastroduodenoscopy  6  History of Gallbladder Surgery  7  History of Gallbladder Surgery  8  History of Hysterectomy  9  History of Laminectomy Lumbar  10  History of Thyroid Surgery Total Thyroidectomy    Family History  Mother   1  Family history of cancer (V16 9) (Z80 9)  2  Family history of Stroke Syndrome (V17 1)  Father   3  Family history of cancer (V16 9) (Z80 9)  4  Family history of heart disease (V17 49) (Z82 49)  Daughter   5  Family history of type 2 diabetes mellitus (V18 0) (Z83 3)  Sister   6   Family history of Breast Cancer (V16 3)  Family History   7  Family history of Family Health Status 5  Children Living    Social History    · Denied: History of Alcohol Use (History)   · Daily Coffee Consumption (1  Cups/Day)   · Daily Cola Consumption (1  Cans/Day)   · Daily Tea Consumption (1  Cups/Day)   · Denied: History of Drug Use   · Marital History -    · Never a smoker   · Occupation: Retired    Current Meds  1  Asmanex 30 Metered Doses 220 MCG/INH Inhalation Aerosol Powder Breath Activated; INHALE 2 PUFFS Daily;1    Therapy: 63XDU6740 to (Evaluate:26Jun2018)  Requested for: 28Dec2017; Last Rx:28Dec2017;   Status: UNAUTHORIZED - Requires Signature Ordered1   2  Aspirin 81 MG Oral Tablet Delayed Release; TAKE 1 TABLET DAILY; Therapy: (Recorded:81Jai0735) to Recorded  3  Atorvastatin Calcium 80 MG Oral Tablet; Take 1 tablet daily  Requested for: 28Dec2017;1  Last Rx:28Dec2017; Status: UNAUTHORIZED - Requires Signature Ordered1   4  Yesenia Contour Monitor w/Device Kit; USE AS DIRECTED; Therapy: 10Igc4240 to (Last Sally Corral)  Requested for: 81Tyx8541 Ordered  5  Yesenia Contour Test In [employer]; TEST 4 TIMES DAILY; Therapy: 27HUF8089 to (Evaluate:16Jul2018)  Requested for: 28Dec2017;1  Last Rx:28Dec2017; Status: UNAUTHORIZED - Requires   Signature Ordered1   6  BD Insulin Syringe Ultrafine 31G X 5/16" 1 ML Miscellaneous; USE AS DIRECTED    Requested for: 22Nov2017; Last Rx:22Nov2017 Ordered  7  Esomeprazole Magnesium 20 MG Oral Capsule Delayed Release; TAKE 1 CAPSULE   ONCE DAILY; Therapy: 40FGZ4362 to (Evaluate:26Jun2018)  Requested for: 11Ouv4167;1  Last Rx:57Kvx8597; Status: UNAUTHORIZED - Requires   Signature Ordered1   8  Furosemide 40 MG Oral Tablet; Take 1 tablet daily  Requested for: 28Dec2017;1  Last Rx:28Woz3113; Status: UNAUTHORIZED - Requires Signature Ordered1   9     1   10   Lancets Ultra Fine Miscellaneous; test glucose three times a day before each meal;    Therapy: 25BTL4039 to (Last Rx:02Nov2017) Requested for: 83EJK4427 Ordered  11  Lantus 100 UNIT/ML Subcutaneous Solution; Inject 30 in am and 30 q pm  units    subcutaneously; Therapy: 85RKE5624 to (Last Rx:26Oct2017)  Requested for: 26Oct2017 Ordered  12  Levothyroxine Sodium 100 MCG Oral Tablet; Take 1 tablet daily  Requested for: 28Dec2017;1  Last Rx:08Gut6518; Status: UNAUTHORIZED - Requires Signature Ordered1   13  Lisinopril 5 MG Oral Tablet; Take 1 tablet daily  Requested for: 28Dec2017;1  Last Rx:28Dec2017; Status: UNAUTHORIZED - Requires Signature Ordered1   14  Magnesium Oxide 400 MG Oral Capsule; Take 1 capsule twice daily; Therapy: 60Sca5819 to (Evaluate:26Jun2018)  Requested for: 28Dec2017; Last    Rx:28Dec2017; Status: ACTIVE - Transmit to [facility] Ordered    1   15  MetFORMIN HCl - 1000 MG Oral Tablet; than one bid; Therapy: 19UDG2620 to (Evaluate:26Jun2018)  Requested for: 28Dec2017;1  Last Rx:28Dec2017; Status: UNAUTHORIZED -    Requires Signature Ordered1   16  Metoprolol Succinate  MG Oral Tablet Extended Release 24 Hour; TAKE 1 TABLET    Bedtime  Requested for: 28Dec2017;1  Last Rx:28Dec2017; Status: UNAUTHORIZED - Requires    Signature Ordered1   17  Metoprolol Succinate ER 50 MG Oral Tablet Extended Release 24 Hour; take 1 tablet    daily in am;    Therapy: 65Bpf1112 to (Evaluate:26Jun2018)  Requested for: 28Dec2017; Last Rx:28Dec2017; Status:    UNAUTHORIZED - Requires Signature Ordered1   18      1   19  Tums CHEW; PRN; Therapy: (Recorded:79Ici9986) to Recorded  20  Tylenol 325 MG Oral Tablet; TAKE 1 TO 2 TABLETS EVERY 4 HOURS AS NEEDED; Therapy: (Recorded:26Xaf5688) to Recorded  21  Ventolin  (90 Base) MCG/ACT Inhalation Aerosol Solution; INHALE 1-2 PUFFS    EVERY 4-6 HOURS AS NEEDED AND AS DIRECTED  Requested for: 28Dec2017;1  Last Rx:28Dec2017; Status: UNAUTHORIZED - Requires Signature Ordered1   22  1   23   Vitamin D3 1000 UNIT Oral Tablet Chewable; CHEW 1 TABLET DAILY; Therapy: 53ZDV5495 to (Last Rx:01Dec2017)  Requested for: 82UEF4031 Ordered  24  Warfarin Sodium 3 MG Oral Tablet; Take 2 tablets on Tuesday, Thursday and 1 tablet all other days;1     Therapy: 27Jun2016-(Last:28Dec2017)  Requested for: 28Dec2017; Last Rx:05Xti0585; Status:    UNAUTHORIZED - Requires Signature Ordered1   Medication List Reviewed: The medication list was reviewed and updated today  1        1 Amended By: Aristides Cain; Dec 28 2017 4:50 PM EST    Allergies   1  Cephalosporins  2  Doxycycline Hyclate CAPS  3  Doxycycline Monohydrate CAPS  4  Levaquin TABS  5  Toradol SOLN   6  IVP Dye    Vitals  Signs   Recorded: 28Dec2017 04:16PM    Temperature: 98 2 F, Tympanic 1    Heart Rate: 79 1    Systolic: 788, LUE, Sitting 1    Diastolic: 70, LUE, Sitting 1    Height: 5 ft 4 in 1    Weight: 170 lb 2 oz 1    BMI Calculated: 29 2 1    BSA Calculated: 1 83 1    O2 Saturation: 93 1      1 Amended By: Aristides Cain; Dec 28 2017 4:50 PM EST    Physical Exam    Constitutional1    General appearance: No acute distress, well appearing and well nourished  1  In pain1   Eyes1    Conjunctiva and lids: No swelling, erythema or discharge  1    Pupils and irises: Equal, round and reactive to light  1    Ears, Nose, Mouth, and Throat1    External inspection of ears and nose: Normal 1    Oropharynx: Normal with no erythema, edema, exudate or lesions  1    Pulmonary1    Respiratory effort: No increased work of breathing or signs of respiratory distress  1    Auscultation of lungs: Clear to auscultation  1    Cardiovascular1    Auscultation of heart: Abnormal  1  A grade 2 systolic murmur was heard at the LLSB  Examination of extremities for edema and/or varicosities: Normal 1    Carotid pulses: Normal 1    Abdomen1    Abdomen: Non-tender, no masses  1    Liver and spleen: No hepatomegaly or splenomegaly  1    Lymphatic1    Palpation of lymph nodes in neck: No lymphadenopathy  1    Musculoskeletal1    Gait and station: Abnormal  1  Gait evaluation demonstrated stooping and shuffling  Digits and nails: Normal without clubbing or cyanosis  1    Inspection/palpation of joints, bones, and muscles: Abnormal  1  Mild thoracic spine TTP  b/l rib cage TTP in thorarax  increased with deep inspiration1   Skin1  No reproducible thorax pain1   Examination of the skin for lesions: Abnormal  1  A medium burn was noted lower left shoulder  described as  presenting with serous drainage, erythematous, tender and fluctuant, but clean, dry, healing well, presenting without active bleeding, presenting without a foul smell, presenting without palpable crepitus, normal temperature and not indurated  Neurologic1    Cranial nerves: Cranial nerves 2-12 intact  1    Psychiatric1    Orientation to person, place, and time: Normal 1    Mood and affect: Normal 1    Additional Exam:1   NO DP or PT1           1 Amended By: Erika Bragg; Dec 28 2017 4:54 PM EST    Health Management  Encounter for special screening examination for genitourinary disorder   *VB-Depression Screening; every 1 year; Next Due: 76ATA7170; Active  History of Depression screening   PHevery-9 Adult Depression Screening; every 1 year; Last 24IJL6081; Next Due: 97BGY9281; Overdue  History of Diabetes mellitus type 2, uncontrolled   *VB - Foot Exam; every 1 year; Last 22Nov2017; Next Due: 22Nov2018; Active  History of Encounter for screening mammogram for malignant neoplasm of breast   Digital Bilateral Screening Mammogram With CAD; every 1 year; Last 41OEW6750; Next Due:  83EKB2143; Overdue  History of Type 2 diabetes mellitus with hyperglycemia   *VB - Eye Exam; every 1 year; Last 53ZVC6714; Next Due: 21Nov2017; Overdue  Screening for genitourinary condition   *VB - Urinary Incontinence Screen (Dx Z13 89 Screen for UI); every 1 year; Last 22Nov2017; Next  Due: 22Nov2018;  Active  Screening for neurological condition   *VB - Fall Risk Assessment  (Dx Z13 89 Screen for Neurologic Disorder); every 1 year; Last  22Nov2017; Next Due: 22Nov2018; Active    Future Appointments    Date/Time Provider Specialty Site   01/04/2018 09:00 AM Doctor, Vivien Verma MD Vascular Surgery [facility]   01/15/2018 09:20 AM SHANEKA Baxter   Cardiology [facility] CARDIOLOGY [place]   01/12/2018 01:00 PM Hilarie Duverney, MD Internal Medicine [facility]   12/28/2017 04:15 PM Kiera Cortez MD Internal Medicine [facility]   01/26/2018 02:15 PM Kiera Cortez MD Internal Medicine [facility] AND [facility]     Signatures   Electronically signed by : Stefany Beasley MD; Dec 26 2017  9:18AM EST                          Electronically signed by : Stefany Beasley MD; Dec 28 2017  4:56PM EST                       (Author)

## 2018-02-09 ENCOUNTER — CLINICAL SUPPORT (OUTPATIENT)
Dept: INTERNAL MEDICINE CLINIC | Facility: CLINIC | Age: 78
End: 2018-02-09
Payer: MEDICARE

## 2018-02-09 DIAGNOSIS — I48.92 ATRIAL FLUTTER, UNSPECIFIED TYPE (HCC): Primary | ICD-10-CM

## 2018-02-09 PROCEDURE — 36415 COLL VENOUS BLD VENIPUNCTURE: CPT

## 2018-02-15 ENCOUNTER — ANTICOAG VISIT (OUTPATIENT)
Dept: INTERNAL MEDICINE CLINIC | Age: 78
End: 2018-02-15

## 2018-02-15 ENCOUNTER — CLINICAL SUPPORT (OUTPATIENT)
Dept: INTERNAL MEDICINE CLINIC | Facility: CLINIC | Age: 78
End: 2018-02-15
Payer: MEDICARE

## 2018-02-15 DIAGNOSIS — I48.92 ATRIAL FLUTTER, UNSPECIFIED TYPE (HCC): Primary | ICD-10-CM

## 2018-02-15 LAB
INR PPP: 2.98 (ref 0.86–1.16)
INR PPP: 2.98 (ref 0.86–1.16)
PROTHROMBIN TIME: 31.4 SECONDS (ref 12.1–14.4)

## 2018-02-15 PROCEDURE — 36415 COLL VENOUS BLD VENIPUNCTURE: CPT

## 2018-02-15 PROCEDURE — 85610 PROTHROMBIN TIME: CPT | Performed by: INTERNAL MEDICINE

## 2018-02-21 ENCOUNTER — OFFICE VISIT (OUTPATIENT)
Dept: CARDIAC SURGERY | Facility: CLINIC | Age: 78
End: 2018-02-21
Payer: MEDICARE

## 2018-02-21 VITALS
SYSTOLIC BLOOD PRESSURE: 102 MMHG | WEIGHT: 173 LBS | DIASTOLIC BLOOD PRESSURE: 56 MMHG | HEIGHT: 64 IN | TEMPERATURE: 97.6 F | HEART RATE: 80 BPM | OXYGEN SATURATION: 98 % | RESPIRATION RATE: 14 BRPM | BODY MASS INDEX: 29.53 KG/M2

## 2018-02-21 DIAGNOSIS — I35.0 AORTIC STENOSIS, SEVERE: Primary | ICD-10-CM

## 2018-02-21 DIAGNOSIS — I35.0 SEVERE AORTIC STENOSIS: ICD-10-CM

## 2018-02-21 PROCEDURE — 99205 OFFICE O/P NEW HI 60 MIN: CPT | Performed by: THORACIC SURGERY (CARDIOTHORACIC VASCULAR SURGERY)

## 2018-02-21 RX ORDER — METHYLPREDNISOLONE 32 MG/1
TABLET ORAL
Qty: 2 TABLET | Refills: 0 | Status: SHIPPED | OUTPATIENT
Start: 2018-02-21 | End: 2018-03-20 | Stop reason: HOSPADM

## 2018-02-21 NOTE — PROGRESS NOTES
Consultation - Cardiothoracic Surgery   Corinna Rodriguez 76 y o  female MRN: [id number]    Physician Requesting Consult: No att  providers found    Reason for Consult / Principal Problem: Aortic stenosis    History of Present Illness: Corinna Rodriguez is a 76y o  year old female who presents for evaluation of severe symptomatic aortic stenosis  State was diagnosed w/ valve issue 2016  Did not feel well, coded in ambulance on way to hospital, was intubated & in hospital awhile but does not remember everything  Was told had heart problem  Saw cardiologist at [address] s/p discharge from rehab & had w/u for TAVR but was told valve was not poor enough to require surgical intervention  Since then her PCP transitioned to [address] now is getting her care through [address] Had ateriogram recently w/ Dr Monsivais & had "heart attack" afterwards therefore was referred to Dr Regan Boudreaux who now refers her to us for TAVR evaluation again  Upon examination today, Ms Moore reports she feeling not well overall  Admits to daily SOB/IGNACIO, intermittent UE paresthesias & daily presyncopal sx  Denies CP, palpitations, LE edema b/l, orthopnea, PND, hx syncopal events, N/V/D, hemoptysis, hematemesis, hematochezia, melena  Denies hx stroke, hx cancer w/ chest wall radiation, hx blood loss anemia, hx varicose veins or vein stripping beside LLE from prior open heart sx  PMH includes HTN, HL, AS, h/o MI, CAD, PAD w/ arterial ulcers, IDDM2 w/ retinopathy, GERD, PAF (on Coumadin), renal calculi, hypothyroidism, h/o Lyme disease, asthma  PSH includes CABG x6 (2004, [facility], Dr Lesa Sharma), lithotripsy, PRABHA, appendectomy, cholecystectomy, laminectomy, thyroidectomy, ateriogram, cardiac cath  Denies FH of CAD/MI, HTN, HL, valvular disease, DM, aortic aneurysms, or SCD  Patient is retred  Lives in a multileve home by self w/ hospital bed at her home  Does not use an assist device for ambulation  Drives  Son lives across street & helps  w/ her care  Denies current or prior use of tobacco, ETOH, or drug use  Allergies include contrast/toradol/keflex/doxycycline/levaquin with rxn chest pain  Cardiac pertinent medications include: Aspirin 81mg, Lipitor 80mg, Lasix 40mg, Lantus, Lisinopril 5mg, Metformin, Toprol 150mg, Warfarin 3mg  Other medications can be reviewed in the patient chart  Patient sees Dr Amairani Hernandez as her primary care physician  Patient sees Dr Halie Hernandez as her cardiologist  Patient sees Dr Monsivais as her vascular surgeon  Patient sees Dr Sonia Venegas as her urologist  Patient has full upper & lower denture      Past Medical History:  Past Medical History:   Diagnosis Date    Altered mental status     Anticoagulated     Coumadin for Aflutter    Asthma     Atrial flutter ([facility])     maintained on coumadin anticoag    CAD (coronary artery disease)     Candidiasis     Carotid stenosis, bilateral     Cataract     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) ([facility])     Chronic fatigue syndrome     Chronic low back pain     Concussion w loss of consciousness of unsp duration, init     Coronary artery disease     Diabetes mellitus (HCC)     type 2, insulin dependent    DJD (degenerative joint disease)     GERD (gastroesophageal reflux disease)     Herpes zoster     HLD (hyperlipidemia)     HTN (hypertension)     Hyperlipidemia     Hypothyroidism     Irritable bowel syndrome     Lumbar radiculopathy     Lyme disease     Dx in hospital 8/2011    Lyme disease     MI (myocardial infarction)     Migraines     Muscle spasm     Non-neoplastic nevus     Nontoxic multinodular goiter     OA (osteoarthritis)     Osteoarthritis     Other chronic pain     PAD (peripheral artery disease) ([facility])     Palpitations     Pseudogout     Spinal stenosis     Transient cerebral ischemia     Trigger ring finger     Viral gastroenteritis      Past Surgical History:   Past Surgical History:   Procedure Laterality Date    APPENDECTOMY      ARTERIOGRAM  12/19/2017    CARDIAC CATHETERIZATION      CHOLECYSTECTOMY      COLONOSCOPY      CORONARY ARTERY BYPASS GRAFT  2004    LUMBAR LAMINECTOMY      THYROIDECTOMY      TOTAL ABDOMINAL HYSTERECTOMY W/ BILATERAL SALPINGOOPHORECTOMY       Family History:  Family History   Problem Relation Age of Onset    Cancer Mother     Stroke Mother     Cancer Father     Heart disease Father     Breast cancer Sister     Diabetes Daughter      Passed away January 2017      Social History:    History   Alcohol Use No     History   Drug Use No     History   Smoking Status    Never Smoker   Smokeless Tobacco    Never Used         Home Medications:   Prior to Admission medications    Medication Sig Start Date End Date Taking?  Authorizing Provider   albuterol (VENTOLIN HFA) 90 mcg/act inhaler Inhale 108 mcg 2 (two) times a day as needed 2 puffs bid PRN    Historical Provider, MD   aspirin 81 MG tablet Take 81 mg by mouth daily    Historical Provider, MD   atorvastatin (LIPITOR) 80 mg tablet Take 80 mg by mouth daily    Historical Provider, MD   calcium carbonate (TUMS) 500 mg chewable tablet Chew as needed    Historical Provider, MD   cholecalciferol (VITAMIN D3) 1,000 units tablet Take 1,000 mg by mouth daily 10/20/16   Historical Provider, MD   esomeprazole (NexIUM) 20 mg capsule 20 mg daily    Historical Provider, MD   furosemide (LASIX) 40 mg tablet Take 40 mg by mouth daily    Historical Provider, MD   LANTUS 100 UNIT/ML subcutaneous injection Inject 30 Units under the skin IN THE MORNING AND 30 UNITS AT BEDTIME 10/31/17   Historical Provider, MD   levothyroxine 100 mcg tablet Take 100 mcg by mouth daily    Historical Provider, MD   lisinopril (ZESTRIL) 5 mg tablet Take 5 mg by mouth daily at bedtime    Historical Provider, MD   Magnesium Oxide 400 MG CAPS Take 400 mg by mouth 2 (two) times a day 8/31/16   Historical Provider, MD   metFORMIN (GLUCOPHAGE) 1000 MG tablet 1,000 mg 2 (two) times a day    Historical Provider, MD   metoprolol succinate (TOPROL-XL) 100 mg 24 hr tablet 100 mg daily at bedtime    Historical Provider, MD   metoprolol succinate (TOPROL-XL) 50 mg 24 hr tablet Take 1 tablet by mouth daily with breakfast 9/1/16   Historical Provider, MD   mometasone (ASMANEX 14 METERED DOSES) 220 MCG/INH inhaler Inhale 1 puff daily at bedtime 1 puff in evening 1/26/18   Stefany Beasley MD   nitroglycerin (NITROSTAT) 0 4 mg SL tablet Place 0 4 mg under the tongue every 3 (three) minutes as needed 5/11/15   Historical Provider, MD   warfarin (COUMADIN) 3 mg tablet Take 3 mg by mouth daily 6 mg tuesdays, 3 mg all other days 6/27/16   Historical Provider, MD       Allergies: Allergies   Allergen Reactions    Other Chest Pain     IVP-listed as "chest pain" in previous chart, patient stated this occurred "a long time ago" but does not remember exactly occurred     Cephalosporins Chest Pain    Doxycycline Chest Pain    Levaquin [Levofloxacin] Chest Pain    Ondansetron Chest Pain     Prolonged QT    Toradol [Ketorolac Tromethamine] Chest Pain       Review of Systems:  Review of Systems - History obtained from the patient  General ROS: negative  Allergy and Immunology ROS: negative  Respiratory ROS: negative  Cardiovascular ROS: negative  Gastrointestinal ROS: negative  Genito-Urinary ROS: negative  Musculoskeletal ROS: negative  Neurological ROS: negative  Dermatological ROS: negative  All other ROS negative    Vital Signs:     Vitals:    02/21/18 1100 02/21/18 1109   BP: 102/62 102/56   BP Location: Left arm Right arm   Patient Position:  Sitting   Cuff Size: Adult Adult   Pulse: 80    Resp: 14    Temp: 97 6 °F (36 4 °C)    TempSrc: Oral    SpO2: 98%    Weight: 78 5 kg (173 lb)    Height: 5' 4" (1 626 m)        Physical Exam:    General: sitting on exam table in NAD  HEENT/NECK:  PERRLA  No jugular venous distention      Cardiac:Irregular rhythm   Carotid arteries: No bruit appreciated  Pulmonary:  Breath sounds clear bilaterally  Abdomen:  Non-tender, Non-distended  Positive bowel sounds  Lower extremities: Extremities warm/dry  Radial/PT/DP pulses 1+ bilaterally  Trace edema B/L  Neuro: Alert and oriented X 3  Sensation is grossly intact  No focal deficits  Skin: Warm/Dry, without rashes or lesions  Lab Results:   Lab Results   Component Value Date    GLUCOSE 175 (H) 01/16/2018    CALCIUM 6 8 (L) 01/16/2018     01/16/2018    K 4 0 01/16/2018    CO2 28 01/16/2018     01/16/2018    BUN 25 01/16/2018    CREATININE 0 86 01/16/2018     Lab Results   Component Value Date    HGBA1C 7 7 (H) 12/19/2017     Lab Results   Component Value Date    CKTOTAL 83 06/09/2017    TROPONINI 2 38 (H) 12/20/2017       Imaging Studies:     Transthoracic Echocardiogram 3/2017: EF 55-60%, moderate MS, severe AS (CHER 0 5cm2, mean 27, peak 57), mild TR    I have personally reviewed pertinent films in PACS    TAVR evaluation Assessment:     Hayden Keating: III    5 Meter Walk: 9/8/8    STS risk score (preliminary): 11    KCCQ-12 completed    Assessment:  Patient Active Problem List    Diagnosis Date Noted    PAD (peripheral artery disease) ([facility] ) 12/19/2017    HTN (hypertension) 12/19/2017    Severe aortic stenosis 12/19/2017    CAD (coronary artery disease) 12/19/2017    DM (diabetes mellitus) type II uncontrolled, periph vascular disorder ([facility] [address] ) 12/19/2017    Atrial flutter ([facility] ) 12/19/2017    Hyperlipidemia 12/19/2017    GERD (gastroesophageal reflux disease) 12/19/2017    Moderate mitral stenosis 12/19/2017    Acute combined systolic and diastolic congestive heart failure ([address] ) 07/05/2016     Severe aortic stenosis; Ongoing TAVR workup    Plan:    Marcello Rodriguez has severe symptomatic aortic stenosis   Based on their STS risk assessment, they will undergo the following testing for transcatheter aortic valve replacement: Gated CTA of the chest/abdomen/pelvis, 3D ROBB, left and right cardiac catheterization, dental clearance, pulmonary function tests with ABG, and carotid artery ultrasound  Once these studies have been completed, Hemanth Ventura will follow up in our office to review the results and to be evaluated by a second surgeon to confirm the suitability of proceeding with transcatheter aortic valve replacment  Hemanth Ventura was comfortable with our recommendations, and their questions were answered to their satisfaction  We will continue to evaluate the patient daily with further recommendations as work up is completed  Thank you for allowing us to participate in the care of this patient       SIGNATURE: Izzy Correa PA-C  DATE: February 21, 2018  TIME: 12:02 PM

## 2018-02-21 NOTE — LETTER
February 21, 2018     Isela Fuentes MD  [address] [address]    Patient: Larisa Reilly   YOB: 1940   Date of Visit: 2/21/2018       Dear Dr Regina Gomez:    Thank you for referring Rosangela Alcantar to me for evaluation  Below are my notes for this consultation  If you have questions, please do not hesitate to call me  I look forward to following your patient along with you  Sincerely,        Shira Gama, DO        CC: MD Andrew Alfaro PA-C  2/21/2018 12:06 PM  Attested  Consultation - Cardiothoracic Surgery   Larisa Reilly 76 y o  female MRN: [id number]    Physician Requesting Consult: No att  providers found    Reason for Consult / Principal Problem: Aortic stenosis    History of Present Illness: Larisa Reilly is a 76y o  year old female who presents for evaluation of severe symptomatic aortic stenosis  State was diagnosed w/ valve issue 2016  Did not feel well, coded in ambulance on way to hospital, was intubated & in hospital awhile but does not remember everything  Was told had heart problem  Saw cardiologist at [facility] s/p discharge from rehab & had w/u for TAVR but was told valve was not poor enough to require surgical intervention  Since then her PCP transitioned to [address] now is getting her care through [address] Had ateriogram recently w/   Doctor & had "heart attack" afterwards therefore was referred to Dr Isela Fuentes who now refers her to us for TAVR evaluation again  Upon examination today, Ms Moore reports she feeling not well overall  Admits to daily SOB/IGNACIO, intermittent UE paresthesias & daily presyncopal sx  Denies CP, palpitations, LE edema b/l, orthopnea, PND, hx syncopal events, N/V/D, hemoptysis, hematemesis, hematochezia, melena  Denies hx stroke, hx cancer w/ chest wall radiation, hx blood loss anemia, hx varicose veins or vein stripping beside LLE from prior open heart sx      PMH includes HTN, HL, AS, h/o MI, CAD, PAD w/ arterial ulcers, IDDM2 w/ retinopathy, GERD, PAF (on Coumadin), renal calculi, hypothyroidism, h/o Lyme disease, asthma  PSH includes CABG x6 (2004, [facility], Dr Rowena Cooper), lithotripsy, PRABHA, appendectomy, cholecystectomy, laminectomy, thyroidectomy, ateriogram, cardiac cath  Denies FH of CAD/MI, HTN, HL, valvular disease, DM, aortic aneurysms, or SCD  Patient is retred  Lives in a multileve home by self w/ hospital bed at her home  Does not use an assist device for ambulation  Drives  Son lives across street & helps  w/ her care  Denies current or prior use of tobacco, ETOH, or drug use  Allergies include contrast/toradol/keflex/doxycycline/levaquin with rxn chest pain  Cardiac pertinent medications include: Aspirin 81mg, Lipitor 80mg, Lasix 40mg, Lantus, Lisinopril 5mg, Metformin, Toprol 150mg, Warfarin 3mg  Other medications can be reviewed in the patient chart  Patient sees Dr Amber Kinsey as her primary care physician  Patient sees Dr Matilde Tan as her cardiologist  Patient sees Dr Monsivais as her vascular surgeon  Patient sees Dr Brea Owens as her urologist  Patient has full upper & lower denture      Past Medical History:  Past Medical History:   Diagnosis Date    Altered mental status     Anticoagulated     Coumadin for Aflutter    Asthma     Atrial flutter (HCC)     maintained on coumadin anticoag    CAD (coronary artery disease)     Candidiasis     Carotid stenosis, bilateral     Cataract     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) (HCC)     Chronic fatigue syndrome     Chronic low back pain     Concussion w loss of consciousness of unsp duration, init     Coronary artery disease     Diabetes mellitus (HCC)     type 2, insulin dependent    DJD (degenerative joint disease)     GERD (gastroesophageal reflux disease)     Herpes zoster     HLD (hyperlipidemia)     HTN (hypertension)     Hyperlipidemia     Hypothyroidism     Irritable bowel syndrome     Lumbar radiculopathy     Lyme disease     Dx in hospital 8/2011    Lyme disease     MI (myocardial infarction)     Migraines     Muscle spasm     Non-neoplastic nevus     Nontoxic multinodular goiter     OA (osteoarthritis)     Osteoarthritis     Other chronic pain     PAD (peripheral artery disease) (HCC)     Palpitations     Pseudogout     Spinal stenosis     Transient cerebral ischemia     Trigger ring finger     Viral gastroenteritis      Past Surgical History:   Past Surgical History:   Procedure Laterality Date    APPENDECTOMY      ARTERIOGRAM  12/19/2017    CARDIAC CATHETERIZATION      CHOLECYSTECTOMY      COLONOSCOPY      CORONARY ARTERY BYPASS GRAFT  2004    LUMBAR LAMINECTOMY      THYROIDECTOMY      TOTAL ABDOMINAL HYSTERECTOMY W/ BILATERAL SALPINGOOPHORECTOMY       Family History:  Family History   Problem Relation Age of Onset    Cancer Mother     Stroke Mother     Cancer Father     Heart disease Father     Breast cancer Sister     Diabetes Daughter      Passed away January 2017      Social History:    History   Alcohol Use No     History   Drug Use No     History   Smoking Status    Never Smoker   Smokeless Tobacco    Never Used         Home Medications:   Prior to Admission medications    Medication Sig Start Date End Date Taking?  Authorizing Provider   albuterol (VENTOLIN HFA) 90 mcg/act inhaler Inhale 108 mcg 2 (two) times a day as needed 2 puffs bid PRN    Historical Provider, MD   aspirin 81 MG tablet Take 81 mg by mouth daily    Historical Provider, MD   atorvastatin (LIPITOR) 80 mg tablet Take 80 mg by mouth daily    Historical Provider, MD   calcium carbonate (TUMS) 500 mg chewable tablet Chew as needed    Historical Provider, MD   cholecalciferol (VITAMIN D3) 1,000 units tablet Take 1,000 mg by mouth daily 10/20/16   Historical Provider, MD   esomeprazole (NexIUM) 20 mg capsule 20 mg daily    Historical Provider, MD   furosemide (LASIX) 40 mg tablet Take 40 mg by mouth daily    Historical Provider, MD   LANTUS 100 UNIT/ML subcutaneous injection Inject 30 Units under the skin IN THE MORNING AND 30 UNITS AT BEDTIME 10/31/17   Historical Provider, MD   levothyroxine 100 mcg tablet Take 100 mcg by mouth daily    Historical Provider, MD   lisinopril (ZESTRIL) 5 mg tablet Take 5 mg by mouth daily at bedtime    Historical Provider, MD   Magnesium Oxide 400 MG CAPS Take 400 mg by mouth 2 (two) times a day 8/31/16   Historical Provider, MD   metFORMIN (GLUCOPHAGE) 1000 MG tablet 1,000 mg 2 (two) times a day    Historical Provider, MD   metoprolol succinate (TOPROL-XL) 100 mg 24 hr tablet 100 mg daily at bedtime    Historical Provider, MD   metoprolol succinate (TOPROL-XL) 50 mg 24 hr tablet Take 1 tablet by mouth daily with breakfast 9/1/16   Historical Provider, MD   mometasone (ASMANEX 14 METERED DOSES) 220 MCG/INH inhaler Inhale 1 puff daily at bedtime 1 puff in evening 1/26/18   Tami Maldonado MD   nitroglycerin (NITROSTAT) 0 4 mg SL tablet Place 0 4 mg under the tongue every 3 (three) minutes as needed 5/11/15   Historical Provider, MD   warfarin (COUMADIN) 3 mg tablet Take 3 mg by mouth daily 6 mg tuesdays, 3 mg all other days 6/27/16   Historical Provider, MD       Allergies:   Allergies   Allergen Reactions    Other Chest Pain     IVP-listed as "chest pain" in previous chart, patient stated this occurred "a long time ago" but does not remember exactly occurred     Cephalosporins Chest Pain    Doxycycline Chest Pain    Levaquin [Levofloxacin] Chest Pain    Ondansetron Chest Pain     Prolonged QT    Toradol [Ketorolac Tromethamine] Chest Pain       Review of Systems:  Review of Systems - History obtained from the patient  General ROS: negative  Allergy and Immunology ROS: negative  Respiratory ROS: negative  Cardiovascular ROS: negative  Gastrointestinal ROS: negative  Genito-Urinary ROS: negative  Musculoskeletal ROS: negative  Neurological ROS: negative  Dermatological ROS: negative  All other ROS negative    Vital Signs:     Vitals:    02/21/18 1100 02/21/18 1109   BP: 102/62 102/56   BP Location: Left arm Right arm   Patient Position:  Sitting   Cuff Size: Adult Adult   Pulse: 80    Resp: 14    Temp: 97 6 °F (36 4 °C)    TempSrc: Oral    SpO2: 98%    Weight: 78 5 kg (173 lb)    Height: 5' 4" (1 626 m)        Physical Exam:    General: sitting on exam table in NAD  HEENT/NECK:  PERRLA  No jugular venous distention  Cardiac:Irregular rhythm   Carotid arteries: No bruit appreciated  Pulmonary:  Breath sounds clear bilaterally  Abdomen:  Non-tender, Non-distended  Positive bowel sounds  Lower extremities: Extremities warm/dry  Radial/PT/DP pulses 1+ bilaterally  Trace edema B/L  Neuro: Alert and oriented X 3  Sensation is grossly intact  No focal deficits  Skin: Warm/Dry, without rashes or lesions        Lab Results:   Lab Results   Component Value Date    GLUCOSE 175 (H) 01/16/2018    CALCIUM 6 8 (L) 01/16/2018     01/16/2018    K 4 0 01/16/2018    CO2 28 01/16/2018     01/16/2018    BUN 25 01/16/2018    CREATININE 0 86 01/16/2018     Lab Results   Component Value Date    HGBA1C 7 7 (H) 12/19/2017     Lab Results   Component Value Date    CKTOTAL 83 06/09/2017    TROPONINI 2 38 (H) 12/20/2017       Imaging Studies:     Transthoracic Echocardiogram 3/2017: EF 55-60%, moderate MS, severe AS (CHER 0 5cm2, mean 27, peak 57), mild TR    I have personally reviewed pertinent films in PACS    TAVR evaluation Assessment:     Zhanna Franklin: III    5 Meter Walk: 9/8/8    STS risk score (preliminary): 11    KCCQ-12 completed    Assessment:  Patient Active Problem List    Diagnosis Date Noted    PAD (peripheral artery disease) ([facility] ) 12/19/2017    HTN (hypertension) 12/19/2017    Severe aortic stenosis 12/19/2017    CAD (coronary artery disease) 12/19/2017    DM (diabetes mellitus) type II uncontrolled, periph vascular disorder ([facility] ) 12/19/2017    [facility] ) 12/19/2017    Hyperlipidemia 12/19/2017    GERD (gastroesophageal reflux disease) 12/19/2017    Moderate mitral stenosis 12/19/2017    Acute combined systolic and diastolic congestive heart failure ([facility] ) 07/05/2016     Severe aortic stenosis; Ongoing TAVR workup    Plan:    Dorothea Lynch has severe symptomatic aortic stenosis  Based on their STS risk assessment, they will undergo the following testing for transcatheter aortic valve replacement: Gated CTA of the chest/abdomen/pelvis, 3D ROBB, left and right cardiac catheterization, dental clearance, pulmonary function tests with ABG, and carotid artery ultrasound  Once these studies have been completed, Dorothea Lynch will follow up in our office to review the results and to be evaluated by a second surgeon to confirm the suitability of proceeding with transcatheter aortic valve replacment  Dorothea Lynch was comfortable with our recommendations, and their questions were answered to their satisfaction  We will continue to evaluate the patient daily with further recommendations as work up is completed  Thank you for allowing us to participate in the care of this patient  Wiliam Akins PA-C  DATE: February 21, 2018  TIME: 12:02 PM  Attestation signed by Marleen Pettit DO at 2/21/2018  1:29 PM:  The patient was seen and examined, and I agree with the midlevel's history, physical exam, assessment and plan with the following additions:    Ms Yesenia Barcenas is symptomatic with severe aortic stenosis  I have recommended TAVR for her  She is at least intermediate risk for SAVR  The risks, benefits, and alternatives to TAVR were discussed with the patient  She agree to proceed with pre-operative workup for TAVR

## 2018-02-22 ENCOUNTER — TELEPHONE (OUTPATIENT)
Dept: CARDIAC SURGERY | Facility: CLINIC | Age: 78
End: 2018-02-22

## 2018-02-22 NOTE — TELEPHONE ENCOUNTER
Patient called to report that she had an episode of SOB last night similar to when she had SOB in 2016 when she had a heart attack   Advised patient to contact Cardiology for possible same day appointment, or to proceed to the ER

## 2018-02-26 ENCOUNTER — OFFICE VISIT (OUTPATIENT)
Dept: VASCULAR SURGERY | Facility: CLINIC | Age: 78
End: 2018-02-26
Payer: MEDICARE

## 2018-02-26 VITALS
HEIGHT: 64 IN | TEMPERATURE: 97.9 F | DIASTOLIC BLOOD PRESSURE: 62 MMHG | HEART RATE: 82 BPM | BODY MASS INDEX: 28.68 KG/M2 | WEIGHT: 168 LBS | RESPIRATION RATE: 20 BRPM | SYSTOLIC BLOOD PRESSURE: 112 MMHG

## 2018-02-26 DIAGNOSIS — I73.9 PAD (PERIPHERAL ARTERY DISEASE) (HCC): ICD-10-CM

## 2018-02-26 PROCEDURE — 99213 OFFICE O/P EST LOW 20 MIN: CPT | Performed by: NURSE PRACTITIONER

## 2018-02-26 NOTE — PROGRESS NOTES
Assessment    1  Encounter for preventive health examination (V70 0) (Z00 00)   2  Encounter for Medicare annual wellness exam (V70 0) (Z00 00)   3  Osteoporosis screening (V82 81) (Z13 820)    Plan  Diabetes mellitus type 2, uncontrolled    · *VB - Eye Exam; Status:Active; Requested [facility]:59DNJ4383;    · *VB - Diabetic Eye Exam Co-Management  *  Status: Active  Requested for: 12Jan2018  Care Summary provided  : Yes  Encounter for Medicare annual wellness exam    · Follow-up visit in 1 year Evaluation and Treatment  Follow-up  Status: Hold For -  Scheduling  Requested for: 12Jan2018   · Begin a limited exercise program ; Status:Complete;   Done: 28UIC2924 01:30PM   · Drink plenty of fluids ; Status:Complete;   Done: 70GOA4616 01:30PM   · Eat a low fat and low cholesterol diet ; Status:Complete;   Done: 88LLY6411 01:30PM   · Stretch and warm up your muscles during the first 10 minutes , then cool down your  muscles for the last 10 minutes of exercise ; Status:Complete;   Done: 39CIL0516  01:30PM   · There ways to avoid falling ; Status:Complete;   Done: 57ING5551 01:30PM   · These are things you can do to prevent falls in and around the home ; Status:Complete;    Done: 03TNU4025 01:30PM   · Vitamins can help you get daily requirements that your diet may not be giving you ;  Status:Complete;   Done: 59WOA7871 01:30PM   · We recommend that you follow the "Mediterranean diet "; Status:Complete;   Done:  52TDP6766 01:30PM  Osteoporosis screening    · * DXA BONE DENSITY SPINE HIP AND PELVIS; Status:Hold For - Scheduling;  Requested for:12Jan2018; Discussion/Summary    77-year-old female:  1  Subsequent annual Medicare wellness visit: Will order DEXA scan  No longer undergoing mammogram evaluation For breast cancer screening  Laboratory studies up-to-date  Impression: Subsequent Annual Wellness Visit       Cardiovascular screening and counseling: the risks and benefits of screening were discussed and screening is current  Colorectal cancer screening and counseling: the risks and benefits of screening were discussed and screening is current  Breast cancer screening and counseling: the risks and benefits of screening were discussed, screening is current and screening not indicated  Osteoporosis screening and counseling: the risks and benefits of screening were discussed, counseling was given on obtaining adequate amounts of calcium and vitamin D on a daily basis and counseling was given on the importance of regular weightbearing exercise  Patient Discussion: plan discussed with the patient, follow-up visit needed in one year  Educational resources provided:   Possible side effects of new medications were reviewed with the patient/guardian today  The treatment plan was reviewed with the patient/guardian  The patient/guardian understands and agrees with the treatment plan      Chief Complaint  pt here for annual wellness visit      History of Present Illness  HPI: 76 y o  female is seen today for Medicare AWV  Welcome to [employer] and Wellness Visits: The patient is being seen for the subsequent annual wellness visit  Medicare Screening and Risk Factors   Hospitalizations: no previous hospitalizations  Medicare Screening Tests Risk Questions   Osteoporosis risk assessment: female gender and over 48years of age  Drug and Alcohol Use: The patient has never smoked cigarettes  The patient reports rare alcohol use  Alcohol concern:   The patient has no concerns about alcohol abuse  She has never used illicit drugs  Diet and Physical Activity: Current diet includes low salt food choices, 1-2 servings of fruit per day, 1-2 servings of vegetables per day, 1-2 servings of meat per day, 1-2 servings of dairy products per day, 1-2 cups of coffee per day, 1-2 cups of tea per day, 0 cans of regular soda per day and 1-2 cans of diet soda per day  She exercises infrequently  Exercise: walking     Mood Disorder and Cognitive Impairment Screening: PHQ-9 Depression Scale   Over the past 2 weeks, how often have you been bothered by the following problems? 1 ) Little interest or pleasure in doing things? Not at all    2 ) Feeling down, depressed or hopeless? Not at all    3 ) Trouble falling asleep or sleeping too much? Not at all    4 ) Feeling tired or having little energy? Not at all    5 ) Poor appetite or overeating? Not at all    6 ) Feeling bad about yourself, or that you are a failure, or have let yourself or your family down? Not at all    7 ) Trouble concentrating on things, such as reading a newspaper or watching television? Not at all    8 ) Moving or speaking so slowly that other people could have noticed, or the opposite, moving or speaking faster than usual? Not at all  TOTAL SCORE: 0    How difficult have these problems made it for you to do your work, take care of things at home, or get along with people? Not at all  Functional Ability/Level of Safety: Hearing is normal bilaterally, normal in the right ear and normal in the left ear  She denies hearing difficulties  She does not use a hearing aid  Fall risk factors: The patient fell 0 times in the past 12 months  Advance Directives: Advance directives: no living will and no durable power of  for health care directives  Co-Managers and Medical Equipment/Suppliers: See Patient Care Team   Reviewed Updated Carol Ask:   Last Medicare Wellness Visit Information was reviewed, patient interviewed, no change since last [facility]  Preventive Quality Program 65 and Older: Falls Risk: The patient fell 0 times in the past 12 months  The patient is currently asymptomatic Symptoms Include: The patient currently has no urinary incontinence symptoms  Patient Care Team    Care Team Member Role Specialty Office Number   Carlos MIRANDA    Urology [phone]   Burak Carvalho MD  Cardiology [phone]   Oneil Padron MD  Pulmonary Medicine [phone] Aileen Gonzáles MD Referring Internal Medicine [phone]   [facility]  Nurse Practitioner [phone]   Rebecca Davalos MD Specialist Vascular Surgery [phone]     Review of Systems    Constitutional: no fever, no chills and no malaise  Head and Face: no facial pain and no facial pressure  Eyes: no watery discharge from the eyes, no itching of the eyes and no blurred vision  ENT: no earache, no hearing loss, no nasal congestion and no nasal discharge  Cardiovascular: no chest pain, no palpitations and no lower extremity edema  Respiratory: no shortness of breath, no wheezing, no cough, no dry cough, no clear sputum, no colored sputum and not vomiting blood  Gastrointestinal: no abdominal pain, no abdominal bloating, no abdominal cramps, no nausea, no vomiting, no diarrhea, no constipation and no bright red blood per rectum  Genitourinary: no dysuria, no dark urine and no hematuria  Musculoskeletal: no diffuse joint pain, no joint swelling, no joint stiffness and no pain in other joints  Integumentary and Breasts: no rashes, no painful skin area with a rash or sore, no erythema and no edema  Neurological: headache, but no confusion, no dizziness, no paresthesias and no leg numbness  Psychiatric: no insomnia, no anxiety and no depression  Endocrine: no muscle weakness and no generalized weakness  Hematologic and Lymphatic: no tendency for easy bleeding, no tendency for easy bruising and no jaundice  Active Problems    1  Acute combined systolic and diastolic congestive heart failure (428 41,428 0) (I50 41)   2  Acute pulmonary edema (518 4) (J81 0)   3  LEONARD (acute kidney injury) (584 9) (N17 9)   4  Arthritis of hand (716 94) (M19 049)   5  Asymptomatic carotid artery stenosis, bilateral (433 10,433 30) (I65 23)   6  At high risk for injury related to fall (V49 89) (Z91 81)   7  Atherosclerosis of artery of extremity with ulceration (440 23) (I70 299,L97 909)   8  Atherosclerosis of native artery of left lower extremity with rest pain (440 22) (I70 222)   9  Atherosclerotic heart disease of native coronary artery without angina pectoris (414 01)   (I25 10)   10  Atrial flutter (427 32) (I48 92)   11  Atrial flutter, unspecified type (427 32) (I48 92)   12  Benign essential HTN (401 1) (I10)   13  CAD (coronary artery disease) (414 00) (I25 10)   14  Current use of long term anticoagulation (V58 61) (Z79 01)   15  Degenerative joint disease involving multiple joints (715 89) (M15 9)   16  Diabetes mellitus type 2, uncontrolled (250 02) (E11 65)   17  Diabetic retinopathy associated with diabetes mellitus of other type (250 50,362 01)    (E13 319)   18  Gastroesophageal reflux disease (530 81) (K21 9)   19  HLD (hyperlipidemia) (272 4) (E78 5)   20  Hypercholesterolemia (272 0) (E78 00)   21  Hyperkalemia (276 7) (E87 5)   22  Hypocalcemia (275 41) (E83 51)   23  Hypokalemia (276 8) (E87 6)   24  Hypomagnesemia (275 2) (E83 42)   25  Hypothyroidism (244 9) (E03 9)   26  LBBB (left bundle branch block) (426 3) (I44 7)   27  Migraine headache (346 90) (G43 909)   28  Minor head trauma (959 01) (S00 90XA)   29  Moderate mitral stenosis (394 0) (I05 0)   30  Nephrolithiasis (592 0) (N20 0)   31  Nonintractable episodic headache, unspecified headache type (784 0) (R51)   32  Nonrheumatic aortic valve stenosis (424 1) (I35 0)   33  NSTEMI (non-ST elevated myocardial infarction) (410 70) (I21 4)   34  Osteoarthritis of both knees, unspecified osteoarthritis type (715 96) (M17 0)   35  Pain of left sacroiliac joint (724 6) (M53 3)   36  Peripheral arterial disease (443 9) (I73 9)   37  Precordial pain (786 51) (R07 2)   38  PVD (peripheral vascular disease) (443 9) (I73 9)   39  Severe aortic stenosis (424 1) (I35 0)   40  ST elevation myocardial infarction (STEMI) (410 90) (I21 3)   41  Subsequent non-ST elevation (NSTEMI) myocardial infarction (410 70) (I22 2)   42   Ulcer of toe of left foot (707 15) (L97 529)    Past Medical History    · History of Abscess of groin (682 2) (L02 214)   · History of Acute bronchitis (466 0) (J20 9)   · History of Acute shoulder pain, right (719 41) (M25 511)   · History of Acute upper respiratory infection (465 9) (J06 9)   · History of Asthma (493 90) (J45 909)   · History of Asthma with acute exacerbation (493 92) (J45 901)   · History of Burn of shoulder, left, first degree, initial encounter   · Denied: History of Carrier Of STD   · History of Chest pain on breathing (786 52) (R07 1)   · History of Chronic low back pain (724 2,338 29) (M54 5,G89 29)   · History of Concussion With Loss Of Consciousness Of Unspecified Duration (850 5)   · History of Coronary Artery Disease (V12 59)   · History of Depression screen (V79 0) (Z13 89)   · History of Diabetes Mellitus (250 00)   · History of Diabetes mellitus type 2, uncontrolled (250 02) (E11 65)   · History of Diabetes mellitus type 2, uncontrolled (250 02) (E11 65)   · History of Diabetic Peripheral Neuropathy (357 2)   · History of Dyspnea on exertion (786 09) (R06 09)   · History of Encounter for screening mammogram for malignant neoplasm of breast  (V76 12) (Z12 31)   · Encounter for special screening examination for genitourinary disorder (V81 6) (Z13 89)   · History of Fall from other slipping, tripping, or stumbling (E885 9) (J05  0XXA)   · History of Flank pain (789 09) (R10 9)   · History of Foreign Body In The Right Ear (931)   · History of Functional murmur (R01 0)   · History of Gross hematuria (599 71) (R31 0)   · History of Hand pain, right (729 5) (M79 641)   · History of Hand pain, unspecified laterality   · History of abdominal pain (V13 89) (I31 553)   · History of acute bronchitis (V12 69) (Z87 09)   · History of allergy (V15 09) (Z88 9)   · History of asthma (V12 69) (Z87 09)   · History of candidiasis (V12 09) (Z86 19)   · History of cataract (V12 49) (Z86 69)   · History of chronic fatigue syndrome (V13 89) (Z87 898)   · History of herpes zoster (V12 09) (Z86 19)   · History of hyperkalemia (V12 29) (Z86 39)   · History of hyperlipidemia (V12 29) (Z86 39)   · History of hypertension (V12 59) (Z86 79)   · History of kidney stones (V13 01) (Z87 442)   · History of Lyme disease (V12 09) (Z86 19)   · History of nausea and vomiting (V12 79) (Z87 898)   · History of pseudogout (V13 59) (Z87 39)   · History of renal calculi (V13 01) (Z87 442)   · History of spinal stenosis (V13 59) (Z87 39)   · History of transient cerebral ischemia (V12 54) (Z86 73)   · History of viral gastroenteritis (V12 09) (Z86 19)   · History of Hypomagnesemia (275 2) (E83 42)   · History of Irritable bowel syndrome (564 1) (K58 9)   · History of Joint Pain In Both Knees (719 46)   · History of Left hip pain (719 45) (M25 552)   · History of Limb pain (729 5) (M79 609)   · History of Lumbar radiculopathy (724 4) (M54 16)   · History of Lyme disease (088 81) (A69 20)   · Denied: History of Mental Status Change   · History of Muscle spasm (728 85) (M62 838)   · History of Neck pain (723 1) (M54 2)   · History of Neck sprain, initial encounter (847 0) (S13 9XXA)   · History of Non-neoplastic nevus (448 1) (I78 1)   · History of Non-toxic multinodular goiter (241 1) (E04 2)   · History of Osteoarthritis (715 90) (M19 90)   · History of Other chronic pain (338 29) (G89 29)   · History of Other nonspecific abnormal finding of lung field (793 19) (R91 8)   · History of Pain and swelling of left lower leg (729 5,729 81) (M79 662,M79 89)   · History of Palpitations (785 1) (R00 2)   · History of Palpitations (785 1) (R00 2)   · History of PPD screening test (V74 1) (Z11 1)   · History of Reported Prior Kidney Disease (V13 00)   · History of Soft Tissue Pain In Toes (729 5)   · History of Strain of knee, left, initial encounter (844 9) (O77 755O)   · History of Stroke Syndrome (436)   · History of Toe fracture, left (826 0) (S92 912A)   · History of Toe pain (729 5) (M79 676)   · History of Trigger ring finger (727 03) (M65 349)   · History of Type 2 diabetes mellitus (250 00) (E11 9)   · History of Type 2 diabetes mellitus with hyperglycemia (250 00) (E11 65)   · History of Urinary Symptoms (788 69)   · History of UTI (urinary tract infection) (599 0) (N39 0)   · History of UTI (urinary tract infection) (599 0) (N39 0)   · History of UTI (urinary tract infection) (599 0) (N39 0)    The active problems and past medical history were reviewed and updated today  Surgical History    · History of Appendectomy   · History of Cholecystectomy   · History of Complete Colonoscopy   · History of Coronary Artery Four Or More Arterial Bypass Grafts   · History of Diagnostic Esophagogastroduodenoscopy   · History of Gallbladder Surgery   · History of Gallbladder Surgery   · History of Hysterectomy   · History of Laminectomy Lumbar   · History of Thyroid Surgery Total Thyroidectomy    The surgical history was reviewed and updated today  Family History  Mother    · Family history of cancer (V16 9) (Z80 9)   · Family history of Stroke Syndrome (V17 1)  Father    · Family history of cancer (V16 9) (Z80 9)   · Family history of heart disease (V17 49) (Z80 55)  Daughter    · Family history of type 2 diabetes mellitus (V18 0) (Z83 3)  Sister    · Family history of Breast Cancer (V16 3)  Family History    · Family history of Family Health Status 5  Children Living    The family history was reviewed and updated today  Social History    · Denied: History of Alcohol Use (History)   · Daily Coffee Consumption (1  Cups/Day)   · Daily Cola Consumption (1  Cans/Day)   · Daily Tea Consumption (1  Cups/Day)   · Denied: History of Drug Use   · Marital History -    · Never a smoker   · Occupation: Retired  The social history was reviewed and updated today  The social history was reviewed and is unchanged  Current Meds   1   Asmanex 30 Metered Doses 220 MCG/INH Inhalation Aerosol Powder Breath Activated;   INHALE 2 PUFFS Daily; Therapy: 63UCA4104 to (Evaluate:26Jun2018)  Requested for: 28Dec2017; Last   Rx:28Dec2017 Ordered   2  Aspirin 81 MG Oral Tablet Delayed Release; TAKE 1 TABLET DAILY; Therapy: (Recorded:20Lav8850) to Recorded   3  Atorvastatin Calcium 80 MG Oral Tablet; Take 1 tablet daily  Requested for: 28Dec2017;   Last Rx:28Dec2017 Ordered   4  Yesenia Contour Monitor w/Device Kit; USE AS DIRECTED; Therapy: 05Pjs3136 to (Last Mckinley Lang)  Requested for: 72Bgr7708 Ordered   5  Yesenia Contour Test In [employer]; TEST 4 TIMES DAILY; Therapy: 71HVU3307 to (Evaluate:97Cai2028)  Requested for: 28Dec2017; Last   Rx:28Dec2017 Ordered   6  BD Insulin Syringe Ultrafine 31G X 5/16" 1 ML Miscellaneous; USE AS DIRECTED    Requested for: 22Nov2017; Last Rx:22Nov2017 Ordered   7  Esomeprazole Magnesium 20 MG Oral Capsule Delayed Release; TAKE 1 CAPSULE   ONCE DAILY; Therapy: 37MJE8952 to (Evaluate:26Jun2018)  Requested for: 28Dec2017; Last   Rx:98Wus7611 Ordered   8  Furosemide 40 MG Oral Tablet; Take 1 tablet daily  Requested for: 28Dec2017; Last   Rx:28Dec2017 Ordered   9  Lancets Ultra Fine Miscellaneous; test glucose three times a day before each meal;   Therapy: 85ILT3917 to (Last Rx:02Nov2017)  Requested for: 82MAZ1207 Ordered   10  Lantus 100 UNIT/ML Subcutaneous Solution; Inject 30 in am and 30 q pm  units    subcutaneously; Therapy: 15ROK1580 to (Last Rx:26Oct2017)  Requested for: 26Oct2017 Ordered   11  Levothyroxine Sodium 100 MCG Oral Tablet; Take 1 tablet daily  Requested for:    28Dec2017; Last Rx:50Jjv2215 Ordered   12  Lisinopril 5 MG Oral Tablet; Take 1 tablet daily  Requested for: 28Dec2017; Last    Rx:28Dec2017 Ordered   13  Magnesium Oxide 400 MG Oral Capsule; Take 1 capsule twice daily; Therapy: 22Zdw7071 to (Evaluate:26Jun2018)  Requested for: 28Dec2017; Last    Rx:89Gdj7582 Ordered   14   MetFORMIN HCl - 1000 MG Oral Tablet; than one bid; Therapy: 56IPU2197 to (Evaluate:26Jun2018)  Requested for: 07Qda7264; Last    Rx:61Gvn5398 Ordered   15  Metoprolol Succinate  MG Oral Tablet Extended Release 24 Hour; TAKE 1    TABLET Bedtime  Requested for: 70Biu9488; Last Rx:84Rys4452 Ordered   16  Metoprolol Succinate ER 50 MG Oral Tablet Extended Release 24 Hour; take 1 tablet    daily in am;    Therapy: 07Yrl5756 to (Evaluate:26Jun2018)  Requested for: 41Fzp1028; Last    Rx:27Jti4335 Ordered   17  Nitroglycerin 0 4 MG Sublingual Tablet Sublingual; PLACE 1 TABLET UNDER THE    TONGUE EVERY 5 MINUTES FOR UP TO 3 DOSES AS NEEDED FOR CHEST    PAIN  CALL 911 IF PAIN PERSISTS; Therapy: 47FOL2049 to (Evaluate:08Apr2018)  Requested for: 31Ncb4771; Last    Rx:68Lea6921 Ordered   18  Tums CHEW; PRN; Therapy: (Recorded:14Vuh1796) to Recorded   19  Tylenol 325 MG Oral Tablet; TAKE 1 TO 2 TABLETS EVERY 4 HOURS AS NEEDED; Therapy: (Recorded:31Hrc2905) to Recorded   20  Ventolin  (90 Base) MCG/ACT Inhalation Aerosol Solution; INHALE 1-2 PUFFS    EVERY 4-6 HOURS AS NEEDED AND AS DIRECTED  Requested for: 28Dec2017; Last    Rx:63Wwv5912 Ordered   21  Vitamin D3 1000 UNIT Oral Tablet Chewable; CHEW 1 TABLET DAILY; Therapy: 57CVF4388 to (Last Rx:40Dpy7173)  Requested for: 22LPW1262 Ordered   22  Warfarin Sodium 3 MG Oral Tablet; Take 2 tablets on Tuesday, Thursday and 1 tablet all    other days; Therapy: 24LZR2487 to (Last Jitendra Clock)  Requested for: 47Cap5105 Ordered    The medication list was reviewed and updated today  Allergies    1  Cephalosporins   2  Doxycycline Hyclate CAPS   3  Doxycycline Monohydrate CAPS   4  Levaquin TABS   5  Toradol SOLN    6  IVP Dye    Immunizations   ** Printed in Appendix #1 below       Vitals  Signs    Temperature: 98 2 F, Oral  Heart Rate: 84  Systolic: 112, LUE, Sitting  Diastolic: 72, LUE, Sitting  Height: 5 ft 4 in  Weight: 170 lb 4 oz  BMI Calculated: 29 22  BSA Calculated: 1 83  O2 Saturation: 98, RA    Physical Exam    Constitutional   General appearance: No acute distress, well appearing and well nourished  Head and Face   Head and face: Normal     Palpation of the face and sinuses: No sinus tenderness  Eyes   Conjunctiva and lids: No swelling, erythema or discharge  Ears, Nose, Mouth, and Throat   Otoscopic examination: Tympanic membranes translucent with normal light reflex  Canals patent without erythema  Hearing: Normal     Nasal mucosa, septum, and turbinates: Normal without edema or erythema  Lips, teeth, and gums: Normal, good dentition  Oropharynx: Normal with no erythema, edema, exudate or lesions  Neck   Neck: Supple, symmetric, trachea midline, no masses  Thyroid: Normal, no thyromegaly  Pulmonary   Respiratory effort: No increased work of breathing or signs of respiratory distress  Percussion of chest: Normal     Palpation of chest: Normal     Auscultation of lungs: Clear to auscultation  Cardiovascular   Palpation of heart: Normal PMI, no thrills  Auscultation of heart: Abnormal   The heart rate was normal  The rhythm was regular  A grade 2 systolic murmur was heard at the RUSB  Carotid pulses: 2+ bilaterally  Abdominal aorta: Normal     Pedal pulses: 2+ bilaterally  Examination of extremities for edema and/or varicosities: Normal     Abdomen   Abdomen: Non-tender, no masses  Liver and spleen: No hepatomegaly or splenomegaly  Lymphatic   Palpation of lymph nodes in neck: No lymphadenopathy  Musculoskeletal   Gait and station: Normal     Digits and nails: Normal without clubbing or cyanosis  Joints, bones, and muscles: Normal     Range of motion: Normal     Stability: Normal     Muscle strength/tone: Normal     Skin   Skin and subcutaneous tissue: Normal without rashes or lesions  Palpation of skin and subcutaneous tissue: Normal turgor  Neurologic   Cranial nerves: Cranial nerves II-XII intact      Cortical function: Normal mental status  Reflexes: 2+ and symmetric  Sensation: No sensory loss  Coordination: Normal finger to nose and heel to shin  Psychiatric   Judgment and insight: Normal     Orientation to person, place, and time: Normal     Recent and remote memory: Intact  Mood and affect: Normal        Health Management  Encounter for special screening examination for genitourinary disorder   *VB-Depression Screening; every 1 year; Next Due: 32XEK4341; Active  History of Depression screening   PHevery-9 Adult Depression Screening; every 1 year; Last 53EPK1304; Next Due:  80PDE8924; Overdue  History of Diabetes mellitus type 2, uncontrolled   *VB - Foot Exam; every 1 year; Last 2017; Next Due: 2018; Active  History of Encounter for screening mammogram for malignant neoplasm of breast   Digital Bilateral Screening Mammogram With CAD; every 1 year; Last 82HCS9467; Next  Due: 75DMD2957; Overdue  History of Screening for genitourinary condition   *VB - Urinary Incontinence Screen (Dx Z13 89 Screen for UI); every 1 year; Last  2017; Next Due: 2018; Active  History of Screening for neurological condition   *VB - Fall Risk Assessment  (Dx Z13 89 Screen for Neurologic Disorder); every 1 year; Last 2017; Next Due: 2018; Active  History of Type 2 diabetes mellitus with hyperglycemia   *VB - Eye Exam; every 1 year; Last 61WHN6498; Next Due: 2017; Overdue    Future Appointments    Date/Time Provider Specialty Site   01/15/2018 09:20 AM SHANEKA Adams  Cardiology [facility]   2018 02:15 PM Tamy Camacho MD Internal Medicine [facility]     Signatures   Electronically signed by :  Alber Parsons MD; 2018  1:35PM EST                       (Author)    Appendix #1     Patient: Nolan Barton ; : 1940; MRN: [id number]      1 2 3 4 5 6    Influenza  29-Nov-2006 28-Nov-2007 06-Nov-2008 16-Nov-2009 01-Dec-2010 16-Sharath-2012    [facility]  2016 PPSV  08-Dec-2008         Tdap  Temporarily Deferred: Pt requests deferral 09-Jan-2017        Tetanus  20-Nov-2006         Zoster  Temporarily Deferred: Pt requests deferral

## 2018-02-26 NOTE — PATIENT INSTRUCTIONS
Applied Betadine paints all wounds on her feet  Have your arterial Doppler and follow up in the office for results  Will plan arteriogram once you undergo your cardiac procedure

## 2018-02-26 NOTE — PROGRESS NOTES
Assessment/Plan:  77 yo F w/ HTN, HLD, diastolic and systolic CHF, aflutter on coumadin, CAD, severe Aortic stenosis, DM, OA, arthritis, DJD, GERD, hypothyroid, migraine, PAD w/ nonhealing L medial great toe wound, R foot tissue loss x1-2 months and bilateral foot burning pain referred by PCP for vascular evaluation  1  PAD  Agram 12/17 c/b flash pulmonary edema  Agram was diagnostic, showed severe tibial disease without distal target, no further revascularization options and previously recommended for local wound care  Now with new onset bilateral foot burning pain and right foot wounds  Will reevaluate with arterial duplex now and return to the office to discuss possible angiogram with right lower extremity revascularization after workup/treatment of severe AS  History of contrast allergy in the distance past  Continue local wound care and routine follow up with Dr Kerline Almanza (podiatry)  Problem List Items Addressed This Visit        Cardiovascular and Mediastinum    PAD (peripheral artery disease) ([facility] [address] )    Relevant Orders    VAS lower limb arterial duplex, complete bilateral                 Patient ID: Saroj Ferraro is a 76 y o  female  Chief Complaint: Pt is here for bilateral discoloration of her feet  Pt has some redness to toes  Pt states she noticed it weeks ago  Pt states the pain to her feet is making walking difficult  Pt states her feet feel "stiff"  Pt is taking aspirin and coumadin daily  HPI  77 yo F w/ HTN, HLD, diastolic and systolic CHF, aflutter on coumadin, CAD, severe Aortic stenosis, DM, OA, arthritis, DJD, GERD, hypothyroid, migraine, PAD w/ nonhealing L medial great toe wound, R foot tissue loss x1-2 months and bilateral foot burning pain referred by PCP for vascular evaluation  She has new onset dry eschars to right lateral 5th toe, distal 3rd digit, medial aspect of bunion that has been present for ~1-2 months  She denies any Injury or trauma though admits to being clumsy  She also complains of stiffness of both feet  She has dependent rubor bilateral forefoot  He is using Betadine paint daily to left foot wounds  She is motor/sensory intact  She is being worked up for TAVR by Dr Melissa Aguiar  The following portions of the patient's history were reviewed and updated as appropriate: allergies, current medications, past family history, past medical history, past social history, past surgical history and problem list     Review of Systems   Constitutional: Positive for fatigue  HENT: Positive for ear pain  Eyes: Negative  Respiratory: Positive for cough, choking and shortness of breath  Cardiovascular: Negative  Gastrointestinal: Positive for nausea  Endocrine: Negative  Genitourinary: Negative  Musculoskeletal: Negative  Skin: Negative  Allergic/Immunologic: Negative  Neurological: Negative  Hematological: Negative  Psychiatric/Behavioral: Negative          Vitals:    02/26/18 1519   BP: 112/62   BP Location: Right arm   Patient Position: Sitting   Cuff Size: Adult   Pulse: 82   Resp: 20   Temp: 97 9 °F (36 6 °C)   TempSrc: Tympanic   Weight: 76 2 kg (168 lb)   Height: 5' 4" (1 626 m)       Patient Active Problem List   Diagnosis    PAD (peripheral artery disease) (HCC)    HTN (hypertension)    Severe aortic stenosis    CAD (coronary artery disease)    DM (diabetes mellitus) type II uncontrolled, periph vascular disorder ([facility])    Atrial flutter (HCC)    Hyperlipidemia    GERD (gastroesophageal reflux disease)    Moderate mitral stenosis    Acute combined systolic and diastolic congestive heart failure ([address] )       Past Surgical History:   Procedure Laterality Date    APPENDECTOMY      ARTERIOGRAM  12/19/2017    CARDIAC CATHETERIZATION      CHOLECYSTECTOMY      COLONOSCOPY      CORONARY ARTERY BYPASS GRAFT  2004    LUMBAR LAMINECTOMY      THYROIDECTOMY      TOTAL ABDOMINAL HYSTERECTOMY W/ BILATERAL SALPINGOOPHORECTOMY Family History   Problem Relation Age of Onset    Cancer Mother     Stroke Mother     Cancer Father     Heart disease Father     Breast cancer Sister     Diabetes Daughter      Passed away January 2017        Social History     Social History    Marital status:      Spouse name: N/A    Number of children: N/A    Years of education: N/A     Occupational History    Not on file       Social History Main Topics    Smoking status: Never Smoker    Smokeless tobacco: Never Used    Alcohol use No    Drug use: No    Sexual activity: No     Other Topics Concern    Not on file     Social History Narrative    No narrative on file       Allergies   Allergen Reactions    Other Chest Pain     IVP-listed as "chest pain" in previous chart, patient stated this occurred "a long time ago" but does not remember exactly occurred     Cephalosporins Chest Pain    Doxycycline Chest Pain    Levaquin [Levofloxacin] Chest Pain    Ondansetron Chest Pain     Prolonged QT    Toradol [Ketorolac Tromethamine] Chest Pain         Current Outpatient Prescriptions:     albuterol (VENTOLIN HFA) 90 mcg/act inhaler, Inhale 108 mcg 2 (two) times a day as needed 2 puffs bid PRN, Disp: , Rfl:     aspirin 81 MG tablet, Take 81 mg by mouth daily, Disp: , Rfl:     atorvastatin (LIPITOR) 80 mg tablet, Take 80 mg by mouth daily, Disp: , Rfl:     calcium carbonate (TUMS) 500 mg chewable tablet, Chew as needed, Disp: , Rfl:     cholecalciferol (VITAMIN D3) 1,000 units tablet, Take 1,000 mg by mouth daily, Disp: , Rfl:     diphenhydrAMINE (BENADRYL) 50 MG tablet, 50mg one hour prior to test, Disp: 1 tablet, Rfl: 0    esomeprazole (NexIUM) 20 mg capsule, 20 mg daily, Disp: , Rfl:     furosemide (LASIX) 40 mg tablet, Take 40 mg by mouth daily, Disp: , Rfl:     LANTUS 100 UNIT/ML subcutaneous injection, Inject 30 Units under the skin IN THE MORNING AND 30 UNITS AT BEDTIME, Disp: , Rfl: 3    levothyroxine 100 mcg tablet, Take 100 mcg by mouth daily, Disp: , Rfl:     lisinopril (ZESTRIL) 5 mg tablet, Take 5 mg by mouth daily at bedtime, Disp: , Rfl:     Magnesium Oxide 400 MG CAPS, Take 400 mg by mouth 2 (two) times a day, Disp: , Rfl:     metFORMIN (GLUCOPHAGE) 1000 MG tablet, 1,000 mg 2 (two) times a day, Disp: , Rfl:     methylPREDNISolone (MEDROL) 32 MG tablet, 32mg twelve hour prior to test, 32mg two hours prior to test, Disp: 2 tablet, Rfl: 0    metoprolol succinate (TOPROL-XL) 100 mg 24 hr tablet, 100 mg daily at bedtime, Disp: , Rfl:     metoprolol succinate (TOPROL-XL) 50 mg 24 hr tablet, Take 1 tablet by mouth daily with breakfast, Disp: , Rfl:     mometasone (ASMANEX 14 METERED DOSES) 220 MCG/INH inhaler, Inhale 1 puff daily at bedtime 1 puff in evening, Disp: 1 Inhaler, Rfl: 5    nitroglycerin (NITROSTAT) 0 4 mg SL tablet, Place 0 4 mg under the tongue every 3 (three) minutes as needed, Disp: , Rfl:     warfarin (COUMADIN) 3 mg tablet, Take 3 mg by mouth daily 6 mg tuesdays, 3 mg all other days, Disp: , Rfl:     LMP  (LMP Unknown)          Physical Exam   Constitutional: She is oriented to person, place, and time  She appears well-developed  Eyes: EOM are normal    Cardiovascular:   Pulses:       Femoral pulses are 1+ on the right side, and 1+ on the left side  Dorsalis pedis pulses are 0 on the right side, and 0 on the left side  Posterior tibial pulses are 0 on the right side, and 0 on the left side  Small dry eschar to right foot on multiple areas including the right lateral 5th toe, distal tip of the 3rd toe and medial aspect of the bunion  No evidence of cellulitis  Positive dependent rubor of bilateral forefoot   Pulmonary/Chest: Effort normal    Musculoskeletal: Normal range of motion  Neurological: She is alert and oriented to person, place, and time  Skin: Skin is dry  Vitals reviewed

## 2018-02-27 ENCOUNTER — HOSPITAL ENCOUNTER (OUTPATIENT)
Dept: NON INVASIVE DIAGNOSTICS | Facility: CLINIC | Age: 78
Discharge: HOME | End: 2018-02-27
Payer: MEDICARE

## 2018-02-27 DIAGNOSIS — I73.9 PAD (PERIPHERAL ARTERY DISEASE) (HCC): ICD-10-CM

## 2018-02-27 PROCEDURE — 93922 UPR/L XTREMITY ART 2 LEVELS: CPT | Performed by: SURGERY

## 2018-02-27 PROCEDURE — 93925 LOWER EXTREMITY STUDY: CPT

## 2018-02-27 PROCEDURE — 93925 LOWER EXTREMITY STUDY: CPT | Performed by: SURGERY

## 2018-02-27 PROCEDURE — 93923 UPR/LXTR ART STDY 3+ LVLS: CPT

## 2018-03-12 ENCOUNTER — OFFICE VISIT (OUTPATIENT)
Dept: INTERNAL MEDICINE CLINIC | Age: 78
End: 2018-03-12
Payer: MEDICARE

## 2018-03-12 VITALS
HEART RATE: 92 BPM | WEIGHT: 171.6 LBS | TEMPERATURE: 97.9 F | OXYGEN SATURATION: 98 % | BODY MASS INDEX: 29.3 KG/M2 | DIASTOLIC BLOOD PRESSURE: 68 MMHG | HEIGHT: 64 IN | SYSTOLIC BLOOD PRESSURE: 110 MMHG

## 2018-03-12 DIAGNOSIS — J45.909 MODERATE ASTHMA WITHOUT COMPLICATION, UNSPECIFIED WHETHER PERSISTENT: Primary | ICD-10-CM

## 2018-03-12 DIAGNOSIS — J06.9 URI, ACUTE: Primary | ICD-10-CM

## 2018-03-12 PROCEDURE — 99213 OFFICE O/P EST LOW 20 MIN: CPT | Performed by: INTERNAL MEDICINE

## 2018-03-12 RX ORDER — GUAIFENESIN 600 MG
1200 TABLET, EXTENDED RELEASE 12 HR ORAL EVERY 12 HOURS SCHEDULED
Qty: 20 TABLET | Refills: 0 | Status: SHIPPED | OUTPATIENT
Start: 2018-03-12 | End: 2018-03-20 | Stop reason: HOSPADM

## 2018-03-12 RX ORDER — LORATADINE 10 MG/1
10 TABLET ORAL DAILY
Qty: 10 TABLET | Refills: 0 | Status: SHIPPED | OUTPATIENT
Start: 2018-03-12 | End: 2018-03-28 | Stop reason: SDUPTHER

## 2018-03-12 RX ORDER — DIPHENHYDRAMINE HCL 50 MG/1
CAPSULE ORAL
Refills: 0 | COMMUNITY
Start: 2018-02-21 | End: 2018-03-20 | Stop reason: HOSPADM

## 2018-03-12 RX ORDER — BENZONATATE 100 MG/1
100 CAPSULE ORAL 3 TIMES DAILY PRN
Qty: 20 CAPSULE | Refills: 0 | Status: SHIPPED | OUTPATIENT
Start: 2018-03-12 | End: 2018-03-20 | Stop reason: HOSPADM

## 2018-03-12 NOTE — ASSESSMENT & PLAN NOTE
Symptoms likely due to viral upper respiratory infection  Will defer on starting antibiotic therapy at this time  Will prescribe benzonatate, Mucinex, and Claritin to be taken as needed for symptom relief  She has an inpatient testing scheduled for this Thursday, March 15, 2018 to which she is to take Benadryl 1 hour prior to procedures  I advised that she not take Claritin during this time

## 2018-03-12 NOTE — PROGRESS NOTES
Assessment/Plan:    URI, acute    Symptoms likely due to viral upper respiratory infection  Will defer on starting antibiotic therapy at this time  Will prescribe benzonatate, Mucinex, and Claritin to be taken as needed for symptom relief  She has an inpatient testing scheduled for this Thursday, March 15, 2018 to which she is to take Benadryl 1 hour prior to procedures  I advised that she not take Claritin during this time  Diagnoses and all orders for this visit:    URI, acute  -     guaiFENesin (MUCINEX) 600 mg 12 hr tablet; Take 2 tablets (1,200 mg total) by mouth every 12 (twelve) hours  -     loratadine (CLARITIN) 10 mg tablet; Take 1 tablet (10 mg total) by mouth daily  -     benzonatate (TESSALON PERLES) 100 mg capsule; Take 1 capsule (100 mg total) by mouth 3 (three) times a day as needed for cough    Other orders  -     BANOPHEN 50 MG capsule; TAKE ONE CAPSULE 1 HOUR PRIOR TO TEST  -     B-D INS SYR ULTRAFINE 1CC/31G 31G X 5/16" 1 ML MISC; Use as directed          Subjective:      Patient ID: Ishaan Castro is a 76 y o  female  77-year-old female is seen today with acute symptoms of upper respiratory infection  Symptoms started within the past few days however has worsened since this morning  She does report multiple family members being sick throughout the past month that she has come in contact with  URI    This is a new problem  The current episode started in the past 7 days  The problem has been unchanged  There has been no fever  Associated symptoms include congestion, coughing and rhinorrhea  Pertinent negatives include no abdominal pain, chest pain, diarrhea, dysuria, ear pain, headaches, joint pain, joint swelling, nausea, neck pain, plugged ear sensation, rash, sinus pain, sneezing, sore throat, swollen glands, vomiting or wheezing  She has tried nothing for the symptoms  The treatment provided mild relief         The following portions of the patient's history were reviewed and updated as appropriate: allergies, current medications, past family history, past medical history, past social history, past surgical history and problem list     Review of Systems   Constitutional: Positive for chills  Negative for activity change, appetite change, diaphoresis, fatigue and fever  HENT: Positive for congestion, postnasal drip and rhinorrhea  Negative for ear pain, sinus pain, sinus pressure, sneezing and sore throat  Eyes: Negative for visual disturbance  Respiratory: Positive for cough and shortness of breath  Negative for apnea, choking, chest tightness and wheezing  Cardiovascular: Negative for chest pain and palpitations  Gastrointestinal: Negative for abdominal distention, abdominal pain, anal bleeding, blood in stool, constipation, diarrhea, nausea and vomiting  Endocrine: Negative for cold intolerance and polyuria  Genitourinary: Negative for difficulty urinating, dysuria and hematuria  Musculoskeletal: Negative for arthralgias, back pain, gait problem, joint pain, joint swelling, myalgias, neck pain and neck stiffness  Skin: Negative for color change, pallor, rash and wound  Neurological: Negative for dizziness, light-headedness and headaches  Hematological: Negative for adenopathy  Psychiatric/Behavioral: Negative for agitation and sleep disturbance           Past Medical History:   Diagnosis Date    Altered mental status     Anticoagulated     Coumadin for Aflutter    Asthma     Atrial flutter (HCC)     maintained on coumadin anticoag    CAD (coronary artery disease)     Candidiasis     Carotid stenosis, bilateral     Cataract     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) ([facility])     Chronic fatigue syndrome     Chronic low back pain     Concussion w loss of consciousness of unsp duration, init     Coronary artery disease     Diabetes mellitus (HCC)     type 2, insulin dependent    DJD (degenerative joint disease)     GERD (gastroesophageal reflux disease)     Herpes zoster     HLD (hyperlipidemia)     HTN (hypertension)     Hyperlipidemia     Hypothyroidism     Irritable bowel syndrome     Lumbar radiculopathy     Lyme disease     Dx in hospital 8/2011    Lyme disease     MI (myocardial infarction)     Migraines     Muscle spasm     Non-neoplastic nevus     Nontoxic multinodular goiter     OA (osteoarthritis)     Osteoarthritis     Other chronic pain     PAD (peripheral artery disease) (HCC)     Palpitations     Pseudogout     Spinal stenosis     Transient cerebral ischemia     Trigger ring finger     Viral gastroenteritis          Current Outpatient Prescriptions:     albuterol (VENTOLIN HFA) 90 mcg/act inhaler, Inhale 108 mcg 2 (two) times a day as needed 2 puffs bid PRN, Disp: , Rfl:     aspirin 81 MG tablet, Take 81 mg by mouth daily, Disp: , Rfl:     atorvastatin (LIPITOR) 80 mg tablet, Take 80 mg by mouth daily, Disp: , Rfl:     B-D INS SYR ULTRAFINE 1CC/31G 31G X 5/16" 1 ML MISC, Use as directed, Disp: , Rfl: 5    BANOPHEN 50 MG capsule, TAKE ONE CAPSULE 1 HOUR PRIOR TO TEST, Disp: , Rfl: 0    calcium carbonate (TUMS) 500 mg chewable tablet, Chew as needed, Disp: , Rfl:     cholecalciferol (VITAMIN D3) 1,000 units tablet, Take 1,000 mg by mouth daily, Disp: , Rfl:     diphenhydrAMINE (BENADRYL) 50 MG tablet, 50mg one hour prior to test, Disp: 1 tablet, Rfl: 0    esomeprazole (NexIUM) 20 mg capsule, 20 mg daily, Disp: , Rfl:     furosemide (LASIX) 40 mg tablet, Take 40 mg by mouth daily, Disp: , Rfl:     LANTUS 100 UNIT/ML subcutaneous injection, Inject 30 Units under the skin IN THE MORNING AND 30 UNITS AT BEDTIME, Disp: , Rfl: 3    levothyroxine 100 mcg tablet, Take 100 mcg by mouth daily, Disp: , Rfl:     lisinopril (ZESTRIL) 5 mg tablet, Take 5 mg by mouth daily at bedtime, Disp: , Rfl:     Magnesium Oxide 400 MG CAPS, Take 400 mg by mouth 2 (two) times a day, Disp: , Rfl:    metFORMIN (GLUCOPHAGE) 1000 MG tablet, 1,000 mg 2 (two) times a day, Disp: , Rfl:     methylPREDNISolone (MEDROL) 32 MG tablet, 32mg twelve hour prior to test, 32mg two hours prior to test, Disp: 2 tablet, Rfl: 0    metoprolol succinate (TOPROL-XL) 100 mg 24 hr tablet, 100 mg daily at bedtime, Disp: , Rfl:     metoprolol succinate (TOPROL-XL) 50 mg 24 hr tablet, Take 1 tablet by mouth daily with breakfast, Disp: , Rfl:     nitroglycerin (NITROSTAT) 0 4 mg SL tablet, Place 0 4 mg under the tongue every 3 (three) minutes as needed, Disp: , Rfl:     warfarin (COUMADIN) 3 mg tablet, Take 3 mg by mouth daily 6 mg tuesdays, 3 mg all other days, Disp: , Rfl:     benzonatate (TESSALON PERLES) 100 mg capsule, Take 1 capsule (100 mg total) by mouth 3 (three) times a day as needed for cough, Disp: 20 capsule, Rfl: 0    guaiFENesin (MUCINEX) 600 mg 12 hr tablet, Take 2 tablets (1,200 mg total) by mouth every 12 (twelve) hours, Disp: 20 tablet, Rfl: 0    loratadine (CLARITIN) 10 mg tablet, Take 1 tablet (10 mg total) by mouth daily, Disp: 10 tablet, Rfl: 0    Allergies   Allergen Reactions    Other Chest Pain     IVP-listed as "chest pain" in previous chart, patient stated this occurred "a long time ago" but does not remember exactly occurred     Cephalosporins Chest Pain    Doxycycline Chest Pain    Levaquin [Levofloxacin] Chest Pain    Ondansetron Chest Pain     Prolonged QT    Toradol [Ketorolac Tromethamine] Chest Pain       Social History   Past Surgical History:   Procedure Laterality Date    APPENDECTOMY      ARTERIOGRAM  12/19/2017    CARDIAC CATHETERIZATION      CHOLECYSTECTOMY      COLONOSCOPY      CORONARY ARTERY BYPASS GRAFT  2004    LUMBAR LAMINECTOMY      THYROIDECTOMY      TOTAL ABDOMINAL HYSTERECTOMY W/ BILATERAL SALPINGOOPHORECTOMY       Family History   Problem Relation Age of Onset    Cancer Mother     Stroke Mother     Cancer Father     Heart disease Father     Breast cancer Sister  Diabetes Daughter      Passed away January 2017        Objective:  /68 (BP Location: Left arm, Patient Position: Sitting, Cuff Size: Standard)   Pulse 92   Temp 97 9 °F (36 6 °C) (Tympanic)   Ht 5' 4" (1 626 m)   Wt 77 8 kg (171 lb 9 6 oz)   LMP  (LMP Unknown)   SpO2 98%   BMI 29 46 kg/m²     Recent Results (from the past 1344 hour(s))   Protime-INR    Collection Time: 01/15/18  4:41 PM   Result Value Ref Range    Protime 42 0 (H) 12 1 - 14 4 seconds    INR 4 30 (H) 0 86 - 8 98   Basic metabolic panel    Collection Time: 01/16/18 12:14 PM   Result Value Ref Range    Sodium 140 136 - 145 mmol/L    Potassium 4 0 3 5 - 5 3 mmol/L    Chloride 102 100 - 108 mmol/L    CO2 28 21 - 32 mmol/L    Anion Gap 10 4 - 13 mmol/L    BUN 25 5 - 25 mg/dL    Creatinine 0 86 0 60 - 1 30 mg/dL    Glucose 175 (H) 65 - 140 mg/dL    Calcium 6 8 (L) 8 3 - 10 1 mg/dL    eGFR 64 ml/min/1 73sq m   Protime-INR    Collection Time: 01/19/18 12:56 PM   Result Value Ref Range    Protime 20 4 (H) 12 1 - 14 4 seconds    INR 1 73 (H) 0 86 - 1 16   Protime-INR    Collection Time: 01/25/18  4:25 PM   Result Value Ref Range    Protime 30 4 (H) 12 1 - 14 4 seconds    INR 2 86 (H) 0 86 - 1 16   Protime-INR    Collection Time: 02/15/18 12:00 AM   Result Value Ref Range    INR 2 98 (A) 0 86 - 1 16   Protime-INR    Collection Time: 02/15/18  1:12 PM   Result Value Ref Range    Protime 31 4 (H) 12 1 - 14 4 seconds    INR 2 98 (H) 0 86 - 1 16            Physical Exam   Constitutional: She is oriented to person, place, and time  She appears well-developed and well-nourished  No distress  HENT:   Head: Normocephalic and atraumatic  Eyes: Conjunctivae and EOM are normal  Pupils are equal, round, and reactive to light  Neck: Normal range of motion  Neck supple  No JVD present  No thyromegaly present  Cardiovascular: Normal rate, regular rhythm and intact distal pulses  Exam reveals no gallop and no friction rub  Murmur heard     Systolic murmur is present with a grade of 3/6   Pulmonary/Chest: Effort normal and breath sounds normal  No respiratory distress  She has no wheezes  She has no rales  She exhibits no tenderness  Abdominal: Soft  Bowel sounds are normal  She exhibits no distension  There is no tenderness  Musculoskeletal: Normal range of motion  She exhibits no edema, tenderness or deformity  Lymphadenopathy:     She has no cervical adenopathy  Neurological: She is alert and oriented to person, place, and time  She has normal reflexes  No cranial nerve deficit  Coordination normal    Skin: Skin is warm and dry  No rash noted  She is not diaphoretic  No erythema  No pallor  Psychiatric: She has a normal mood and affect  Her behavior is normal  Judgment and thought content normal    Nursing note and vitals reviewed

## 2018-03-15 ENCOUNTER — HOSPITAL ENCOUNTER (OUTPATIENT)
Dept: RADIOLOGY | Facility: HOSPITAL | Age: 78
Discharge: HOME | DRG: 280 | End: 2018-03-15
Payer: MEDICARE

## 2018-03-15 ENCOUNTER — APPOINTMENT (OUTPATIENT)
Dept: NON INVASIVE DIAGNOSTICS | Facility: HOSPITAL | Age: 78
DRG: 280 | End: 2018-03-15
Payer: MEDICARE

## 2018-03-15 ENCOUNTER — HOSPITAL ENCOUNTER (OUTPATIENT)
Dept: NON INVASIVE DIAGNOSTICS | Facility: HOSPITAL | Age: 78
Discharge: HOME | End: 2018-03-15

## 2018-03-15 ENCOUNTER — TRANSCRIBE ORDERS (OUTPATIENT)
Dept: RADIOLOGY | Facility: HOSPITAL | Age: 78
End: 2018-03-15

## 2018-03-15 ENCOUNTER — HOSPITAL ENCOUNTER (INPATIENT)
Facility: HOSPITAL | Age: 78
LOS: 5 days | Discharge: HOME HEALTH | DRG: 280 | End: 2018-03-20
Attending: EMERGENCY MEDICINE | Admitting: INTERNAL MEDICINE
Payer: MEDICARE

## 2018-03-15 ENCOUNTER — HOSPITAL ENCOUNTER (OUTPATIENT)
Dept: PULMONOLOGY | Facility: HOSPITAL | Age: 78
Discharge: HOME | DRG: 280 | End: 2018-03-15
Payer: MEDICARE

## 2018-03-15 ENCOUNTER — APPOINTMENT (EMERGENCY)
Dept: RADIOLOGY | Facility: HOSPITAL | Age: 78
DRG: 280 | End: 2018-03-15
Payer: MEDICARE

## 2018-03-15 DIAGNOSIS — I35.0 AORTIC STENOSIS, SEVERE: ICD-10-CM

## 2018-03-15 DIAGNOSIS — R06.03 RESPIRATORY DISTRESS: ICD-10-CM

## 2018-03-15 DIAGNOSIS — I35.0 CRITICAL AORTIC VALVE STENOSIS: ICD-10-CM

## 2018-03-15 DIAGNOSIS — E87.70 FLUID OVERLOAD: ICD-10-CM

## 2018-03-15 DIAGNOSIS — I50.41 ACUTE COMBINED SYSTOLIC AND DIASTOLIC CONGESTIVE HEART FAILURE (HCC): ICD-10-CM

## 2018-03-15 DIAGNOSIS — I35.0 NODULAR CALCIFIC AORTIC VALVE STENOSIS: Primary | ICD-10-CM

## 2018-03-15 DIAGNOSIS — I10 ESSENTIAL HYPERTENSION: ICD-10-CM

## 2018-03-15 DIAGNOSIS — E78.00 PURE HYPERCHOLESTEROLEMIA: ICD-10-CM

## 2018-03-15 DIAGNOSIS — I48.91 ATRIAL FIBRILLATION WITH RAPID VENTRICULAR RESPONSE (HCC): ICD-10-CM

## 2018-03-15 DIAGNOSIS — I35.0 SEVERE AORTIC STENOSIS: ICD-10-CM

## 2018-03-15 DIAGNOSIS — I50.1 ACUTE CARDIOGENIC PULMONARY EDEMA (HCC): Primary | ICD-10-CM

## 2018-03-15 PROBLEM — J81.0 ACUTE PULMONARY EDEMA (HCC): Status: ACTIVE | Noted: 2018-03-15

## 2018-03-15 LAB
ANION GAP SERPL CALCULATED.3IONS-SCNC: 16 MMOL/L (ref 4–13)
ARTERIAL PATENCY WRIST A: ABNORMAL
ATRIAL RATE: 127 BPM
BASE EXCESS BLDA CALC-SCNC: -1 MMOL/L (ref -2–3)
BASOPHILS # BLD AUTO: 0.02 THOUSANDS/ΜL (ref 0–0.1)
BASOPHILS NFR BLD AUTO: 0 % (ref 0–1)
BUN SERPL-MCNC: 28 MG/DL (ref 5–25)
CA-I BLD-SCNC: 0.91 MMOL/L (ref 1.12–1.32)
CALCIUM SERPL-MCNC: 7.4 MG/DL (ref 8.3–10.1)
CHLORIDE SERPL-SCNC: 99 MMOL/L (ref 100–108)
CO2 SERPL-SCNC: 19 MMOL/L (ref 21–32)
CREAT SERPL-MCNC: 1.4 MG/DL (ref 0.6–1.3)
EOSINOPHIL # BLD AUTO: 0.02 THOUSAND/ΜL (ref 0–0.61)
EOSINOPHIL NFR BLD AUTO: 0 % (ref 0–6)
ERYTHROCYTE [DISTWIDTH] IN BLOOD BY AUTOMATED COUNT: 17.9 % (ref 11.6–15.1)
FIO2 GAS DIL.REBREATH: 21 L
GFR SERPL CREATININE-BSD FRML MDRD: 36 ML/MIN/1.73SQ M
GLUCOSE SERPL-MCNC: 192 MG/DL (ref 65–140)
GLUCOSE SERPL-MCNC: 220 MG/DL (ref 65–140)
GLUCOSE SERPL-MCNC: 253 MG/DL (ref 65–140)
GLUCOSE SERPL-MCNC: 317 MG/DL (ref 65–140)
GLUCOSE SERPL-MCNC: 389 MG/DL (ref 65–140)
HCO3 BLDA-SCNC: 23.5 MMOL/L (ref 22–28)
HCT VFR BLD AUTO: 40.1 % (ref 34.8–46.1)
HCT VFR BLD CALC: 34 % (ref 34.8–46.1)
HGB BLD-MCNC: 12.2 G/DL (ref 11.5–15.4)
HGB BLDA-MCNC: 11.6 G/DL (ref 11.5–15.4)
INR PPP: 2.14 (ref 0.86–1.16)
LYMPHOCYTES # BLD AUTO: 2.79 THOUSANDS/ΜL (ref 0.6–4.47)
LYMPHOCYTES NFR BLD AUTO: 18 % (ref 14–44)
MCH RBC QN AUTO: 21.3 PG (ref 26.8–34.3)
MCHC RBC AUTO-ENTMCNC: 30.4 G/DL (ref 31.4–37.4)
MCV RBC AUTO: 70 FL (ref 82–98)
MONOCYTES # BLD AUTO: 0.19 THOUSAND/ΜL (ref 0.17–1.22)
MONOCYTES NFR BLD AUTO: 1 % (ref 4–12)
NEUTROPHILS # BLD AUTO: 12.39 THOUSANDS/ΜL (ref 1.85–7.62)
NEUTS SEG NFR BLD AUTO: 81 % (ref 43–75)
NRBC BLD AUTO-RTO: 0 /100 WBCS
NT-PROBNP SERPL-MCNC: ABNORMAL PG/ML
PCO2 BLD: 25 MMOL/L (ref 21–32)
PCO2 BLD: 34.1 MM HG (ref 36–44)
PH BLD: 7.45 [PH] (ref 7.35–7.45)
PLATELET # BLD AUTO: 472 THOUSANDS/UL (ref 149–390)
PMV BLD AUTO: 10.7 FL (ref 8.9–12.7)
PO2 BLD: 75 MM HG (ref 75–129)
POTASSIUM BLD-SCNC: 4 MMOL/L (ref 3.5–5.3)
POTASSIUM SERPL-SCNC: 3.3 MMOL/L (ref 3.5–5.3)
PROTHROMBIN TIME: 24.1 SECONDS (ref 12.1–14.4)
QRS AXIS: -69 DEGREES
QRSD INTERVAL: 156 MS
QT INTERVAL: 390 MS
QTC INTERVAL: 558 MS
RBC # BLD AUTO: 5.73 MILLION/UL (ref 3.81–5.12)
SAMPLE SITE: ABNORMAL
SAO2 % BLD FROM PO2: 96 % (ref 95–98)
SODIUM BLD-SCNC: 138 MMOL/L (ref 136–145)
SODIUM SERPL-SCNC: 134 MMOL/L (ref 136–145)
SPECIMEN SOURCE: ABNORMAL
T WAVE AXIS: 97 DEGREES
TROPONIN I SERPL-MCNC: 0.56 NG/ML
TROPONIN I SERPL-MCNC: 1.61 NG/ML
TROPONIN I SERPL-MCNC: 1.98 NG/ML
TROPONIN I SERPL-MCNC: 2.2 NG/ML
VENTRICULAR RATE: 123 BPM
WBC # BLD AUTO: 15.45 THOUSAND/UL (ref 4.31–10.16)

## 2018-03-15 PROCEDURE — 94660 CPAP INITIATION&MGMT: CPT

## 2018-03-15 PROCEDURE — 71045 X-RAY EXAM CHEST 1 VIEW: CPT

## 2018-03-15 PROCEDURE — 84484 ASSAY OF TROPONIN QUANT: CPT | Performed by: NURSE PRACTITIONER

## 2018-03-15 PROCEDURE — 96375 TX/PRO/DX INJ NEW DRUG ADDON: CPT

## 2018-03-15 PROCEDURE — 82330 ASSAY OF CALCIUM: CPT

## 2018-03-15 PROCEDURE — 96374 THER/PROPH/DIAG INJ IV PUSH: CPT

## 2018-03-15 PROCEDURE — 75572 CT HRT W/3D IMAGE: CPT

## 2018-03-15 PROCEDURE — 99285 EMERGENCY DEPT VISIT HI MDM: CPT

## 2018-03-15 PROCEDURE — 99222 1ST HOSP IP/OBS MODERATE 55: CPT | Performed by: INTERNAL MEDICINE

## 2018-03-15 PROCEDURE — 94729 DIFFUSING CAPACITY: CPT | Performed by: INTERNAL MEDICINE

## 2018-03-15 PROCEDURE — 85610 PROTHROMBIN TIME: CPT | Performed by: STUDENT IN AN ORGANIZED HEALTH CARE EDUCATION/TRAINING PROGRAM

## 2018-03-15 PROCEDURE — 82948 REAGENT STRIP/BLOOD GLUCOSE: CPT

## 2018-03-15 PROCEDURE — 84295 ASSAY OF SERUM SODIUM: CPT

## 2018-03-15 PROCEDURE — 83880 ASSAY OF NATRIURETIC PEPTIDE: CPT | Performed by: STUDENT IN AN ORGANIZED HEALTH CARE EDUCATION/TRAINING PROGRAM

## 2018-03-15 PROCEDURE — 74174 CTA ABD&PLVS W/CONTRAST: CPT

## 2018-03-15 PROCEDURE — 94729 DIFFUSING CAPACITY: CPT

## 2018-03-15 PROCEDURE — 85014 HEMATOCRIT: CPT

## 2018-03-15 PROCEDURE — 85025 COMPLETE CBC W/AUTO DIFF WBC: CPT | Performed by: EMERGENCY MEDICINE

## 2018-03-15 PROCEDURE — 82803 BLOOD GASES ANY COMBINATION: CPT

## 2018-03-15 PROCEDURE — 36600 WITHDRAWAL OF ARTERIAL BLOOD: CPT

## 2018-03-15 PROCEDURE — 84132 ASSAY OF SERUM POTASSIUM: CPT

## 2018-03-15 PROCEDURE — 93306 TTE W/DOPPLER COMPLETE: CPT

## 2018-03-15 PROCEDURE — 94060 EVALUATION OF WHEEZING: CPT

## 2018-03-15 PROCEDURE — 94726 PLETHYSMOGRAPHY LUNG VOLUMES: CPT | Performed by: INTERNAL MEDICINE

## 2018-03-15 PROCEDURE — 93010 ELECTROCARDIOGRAM REPORT: CPT | Performed by: INTERNAL MEDICINE

## 2018-03-15 PROCEDURE — 84484 ASSAY OF TROPONIN QUANT: CPT | Performed by: EMERGENCY MEDICINE

## 2018-03-15 PROCEDURE — 94760 N-INVAS EAR/PLS OXIMETRY 1: CPT

## 2018-03-15 PROCEDURE — 82947 ASSAY GLUCOSE BLOOD QUANT: CPT

## 2018-03-15 PROCEDURE — 36415 COLL VENOUS BLD VENIPUNCTURE: CPT | Performed by: EMERGENCY MEDICINE

## 2018-03-15 PROCEDURE — 94060 EVALUATION OF WHEEZING: CPT | Performed by: INTERNAL MEDICINE

## 2018-03-15 PROCEDURE — 80048 BASIC METABOLIC PNL TOTAL CA: CPT | Performed by: EMERGENCY MEDICINE

## 2018-03-15 PROCEDURE — 94726 PLETHYSMOGRAPHY LUNG VOLUMES: CPT

## 2018-03-15 PROCEDURE — 93005 ELECTROCARDIOGRAM TRACING: CPT | Performed by: EMERGENCY MEDICINE

## 2018-03-15 PROCEDURE — 84484 ASSAY OF TROPONIN QUANT: CPT | Performed by: STUDENT IN AN ORGANIZED HEALTH CARE EDUCATION/TRAINING PROGRAM

## 2018-03-15 PROCEDURE — 99291 CRITICAL CARE FIRST HOUR: CPT | Performed by: INTERNAL MEDICINE

## 2018-03-15 RX ORDER — METOPROLOL TARTRATE 5 MG/5ML
5 INJECTION INTRAVENOUS ONCE
Status: COMPLETED | OUTPATIENT
Start: 2018-03-15 | End: 2018-03-15

## 2018-03-15 RX ORDER — INSULIN GLARGINE 100 [IU]/ML
30 INJECTION, SOLUTION SUBCUTANEOUS EVERY 12 HOURS SCHEDULED
Status: DISCONTINUED | OUTPATIENT
Start: 2018-03-15 | End: 2018-03-15

## 2018-03-15 RX ORDER — FUROSEMIDE 10 MG/ML
80 INJECTION INTRAMUSCULAR; INTRAVENOUS ONCE
Status: COMPLETED | OUTPATIENT
Start: 2018-03-15 | End: 2018-03-15

## 2018-03-15 RX ORDER — ALBUTEROL SULFATE 90 UG/1
1 AEROSOL, METERED RESPIRATORY (INHALATION) 2 TIMES DAILY PRN
Status: DISCONTINUED | OUTPATIENT
Start: 2018-03-15 | End: 2018-03-15

## 2018-03-15 RX ORDER — LORATADINE 10 MG/1
10 TABLET ORAL DAILY
Status: DISCONTINUED | OUTPATIENT
Start: 2018-03-16 | End: 2018-03-20 | Stop reason: HOSPADM

## 2018-03-15 RX ORDER — POTASSIUM CHLORIDE 14.9 MG/ML
20 INJECTION INTRAVENOUS ONCE
Status: COMPLETED | OUTPATIENT
Start: 2018-03-15 | End: 2018-03-17

## 2018-03-15 RX ORDER — ALBUTEROL SULFATE 2.5 MG/3ML
2.5 SOLUTION RESPIRATORY (INHALATION) ONCE
Status: COMPLETED | OUTPATIENT
Start: 2018-03-15 | End: 2018-03-15

## 2018-03-15 RX ORDER — FUROSEMIDE 10 MG/ML
40 INJECTION INTRAMUSCULAR; INTRAVENOUS ONCE
Status: COMPLETED | OUTPATIENT
Start: 2018-03-15 | End: 2018-03-15

## 2018-03-15 RX ORDER — PANTOPRAZOLE SODIUM 20 MG/1
20 TABLET, DELAYED RELEASE ORAL
Status: DISCONTINUED | OUTPATIENT
Start: 2018-03-16 | End: 2018-03-15

## 2018-03-15 RX ORDER — FLUTICASONE PROPIONATE 110 UG/1
1 AEROSOL, METERED RESPIRATORY (INHALATION) EVERY 12 HOURS SCHEDULED
Status: DISCONTINUED | OUTPATIENT
Start: 2018-03-16 | End: 2018-03-20 | Stop reason: HOSPADM

## 2018-03-15 RX ORDER — PROMETHAZINE HYDROCHLORIDE 25 MG/ML
12.5 INJECTION, SOLUTION INTRAMUSCULAR; INTRAVENOUS ONCE
Status: COMPLETED | OUTPATIENT
Start: 2018-03-15 | End: 2018-03-15

## 2018-03-15 RX ORDER — POTASSIUM CHLORIDE 20 MEQ/1
40 TABLET, EXTENDED RELEASE ORAL ONCE
Status: COMPLETED | OUTPATIENT
Start: 2018-03-15 | End: 2018-03-15

## 2018-03-15 RX ORDER — ESMOLOL HYDROCHLORIDE 10 MG/ML
0.5 INJECTION INTRAVENOUS ONCE
Status: DISCONTINUED | OUTPATIENT
Start: 2018-03-15 | End: 2018-03-15

## 2018-03-15 RX ORDER — PANTOPRAZOLE SODIUM 20 MG/1
20 TABLET, DELAYED RELEASE ORAL DAILY
Status: DISCONTINUED | OUTPATIENT
Start: 2018-03-15 | End: 2018-03-15

## 2018-03-15 RX ORDER — ASPIRIN 81 MG/1
81 TABLET, CHEWABLE ORAL DAILY
Status: DISCONTINUED | OUTPATIENT
Start: 2018-03-16 | End: 2018-03-20 | Stop reason: HOSPADM

## 2018-03-15 RX ORDER — PANTOPRAZOLE SODIUM 40 MG/1
40 TABLET, DELAYED RELEASE ORAL
Status: DISCONTINUED | OUTPATIENT
Start: 2018-03-16 | End: 2018-03-20 | Stop reason: HOSPADM

## 2018-03-15 RX ORDER — CALCIUM CARBONATE 200(500)MG
500 TABLET,CHEWABLE ORAL AS NEEDED
Status: DISCONTINUED | OUTPATIENT
Start: 2018-03-15 | End: 2018-03-20 | Stop reason: HOSPADM

## 2018-03-15 RX ORDER — METOPROLOL SUCCINATE 50 MG/1
50 TABLET, EXTENDED RELEASE ORAL
Status: DISCONTINUED | OUTPATIENT
Start: 2018-03-16 | End: 2018-03-20 | Stop reason: HOSPADM

## 2018-03-15 RX ORDER — BENZONATATE 100 MG/1
100 CAPSULE ORAL 3 TIMES DAILY PRN
Status: DISCONTINUED | OUTPATIENT
Start: 2018-03-15 | End: 2018-03-20 | Stop reason: HOSPADM

## 2018-03-15 RX ORDER — LEVOTHYROXINE SODIUM 0.1 MG/1
100 TABLET ORAL
Status: DISCONTINUED | OUTPATIENT
Start: 2018-03-16 | End: 2018-03-20 | Stop reason: HOSPADM

## 2018-03-15 RX ORDER — FUROSEMIDE 10 MG/ML
80 INJECTION INTRAMUSCULAR; INTRAVENOUS ONCE
Status: DISCONTINUED | OUTPATIENT
Start: 2018-03-15 | End: 2018-03-15

## 2018-03-15 RX ORDER — METOPROLOL SUCCINATE 100 MG/1
100 TABLET, EXTENDED RELEASE ORAL
Status: DISCONTINUED | OUTPATIENT
Start: 2018-03-15 | End: 2018-03-20 | Stop reason: HOSPADM

## 2018-03-15 RX ORDER — CHLORHEXIDINE GLUCONATE 0.12 MG/ML
15 RINSE ORAL EVERY 12 HOURS SCHEDULED
Status: DISCONTINUED | OUTPATIENT
Start: 2018-03-15 | End: 2018-03-20 | Stop reason: HOSPADM

## 2018-03-15 RX ORDER — ACETAMINOPHEN 325 MG/1
650 TABLET ORAL EVERY 6 HOURS PRN
Status: DISCONTINUED | OUTPATIENT
Start: 2018-03-15 | End: 2018-03-20 | Stop reason: HOSPADM

## 2018-03-15 RX ORDER — MELATONIN
1000 DAILY
Status: DISCONTINUED | OUTPATIENT
Start: 2018-03-16 | End: 2018-03-20 | Stop reason: HOSPADM

## 2018-03-15 RX ORDER — WARFARIN SODIUM 1 MG/1
3 TABLET ORAL
Status: DISCONTINUED | OUTPATIENT
Start: 2018-03-16 | End: 2018-03-15

## 2018-03-15 RX ORDER — FUROSEMIDE 40 MG/1
40 TABLET ORAL
Status: DISCONTINUED | OUTPATIENT
Start: 2018-03-16 | End: 2018-03-17

## 2018-03-15 RX ORDER — ALBUTEROL SULFATE 2.5 MG/3ML
2.5 SOLUTION RESPIRATORY (INHALATION) EVERY 4 HOURS PRN
Status: DISCONTINUED | OUTPATIENT
Start: 2018-03-15 | End: 2018-03-19

## 2018-03-15 RX ORDER — FUROSEMIDE 40 MG/1
40 TABLET ORAL DAILY
Status: DISCONTINUED | OUTPATIENT
Start: 2018-03-16 | End: 2018-03-15

## 2018-03-15 RX ORDER — ATORVASTATIN CALCIUM 80 MG/1
80 TABLET, FILM COATED ORAL
Status: DISCONTINUED | OUTPATIENT
Start: 2018-03-15 | End: 2018-03-20 | Stop reason: HOSPADM

## 2018-03-15 RX ADMIN — IODIXANOL 120 ML: 320 INJECTION, SOLUTION INTRAVASCULAR at 10:48

## 2018-03-15 RX ADMIN — METOPROLOL SUCCINATE 100 MG: 100 TABLET, EXTENDED RELEASE ORAL at 21:21

## 2018-03-15 RX ADMIN — POTASSIUM CHLORIDE 20 MEQ: 200 INJECTION, SOLUTION INTRAVENOUS at 16:54

## 2018-03-15 RX ADMIN — ATORVASTATIN CALCIUM 80 MG: 80 TABLET, FILM COATED ORAL at 16:53

## 2018-03-15 RX ADMIN — METOPROLOL TARTRATE 5 MG: 5 INJECTION, SOLUTION INTRAVENOUS at 11:16

## 2018-03-15 RX ADMIN — POTASSIUM CHLORIDE 40 MEQ: 1500 TABLET, EXTENDED RELEASE ORAL at 21:21

## 2018-03-15 RX ADMIN — FUROSEMIDE 80 MG: 10 INJECTION, SOLUTION INTRAMUSCULAR; INTRAVENOUS at 10:56

## 2018-03-15 RX ADMIN — ALBUTEROL SULFATE 2.5 MG: 2.5 SOLUTION RESPIRATORY (INHALATION) at 09:10

## 2018-03-15 RX ADMIN — CHLORHEXIDINE GLUCONATE 15 ML: 1.2 RINSE ORAL at 21:21

## 2018-03-15 RX ADMIN — PROMETHAZINE HYDROCHLORIDE 12.5 MG: 25 INJECTION INTRAMUSCULAR; INTRAVENOUS at 11:13

## 2018-03-15 RX ADMIN — FUROSEMIDE 40 MG: 10 INJECTION, SOLUTION INTRAMUSCULAR; INTRAVENOUS at 19:20

## 2018-03-15 RX ADMIN — INSULIN LISPRO 4 UNITS: 100 INJECTION, SOLUTION INTRAVENOUS; SUBCUTANEOUS at 19:41

## 2018-03-15 RX ADMIN — POTASSIUM CHLORIDE 40 MEQ: 1500 TABLET, EXTENDED RELEASE ORAL at 16:53

## 2018-03-15 NOTE — RESPIRATORY THERAPY NOTE
RT Protocol Note  Ximena Tello 76 y o  female MRN: [id number]  Unit/Bed#: ED 26 Encounter: [id number]    Assessment    Active Problems:    * No active hospital problems  *      Home Pulmonary Medications:      Past Medical History:   Diagnosis Date    Altered mental status     Anticoagulated     Coumadin for Aflutter    Asthma     Atrial flutter (HCC)     maintained on coumadin anticoag    CAD (coronary artery disease)     Candidiasis     Carotid stenosis, bilateral     Cataract     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) ([facility]     Chronic fatigue syndrome     Chronic low back pain     Concussion w loss of consciousness of unsp duration, init     Coronary artery disease     Diabetes mellitus ([facility])     type 2, insulin dependent    DJD (degenerative joint disease)     GERD (gastroesophageal reflux disease)     Herpes zoster     HLD (hyperlipidemia)     HTN (hypertension)     Hyperlipidemia     Hypothyroidism     Irritable bowel syndrome     Lumbar radiculopathy     Lyme disease     Dx in hospital 8/2011    Lyme disease     MI (myocardial infarction)     Migraines     Muscle spasm     Non-neoplastic nevus     Nontoxic multinodular goiter     OA (osteoarthritis)     Osteoarthritis     Other chronic pain     PAD (peripheral artery disease) ([facility]     Palpitations     Pseudogout     Spinal stenosis     Transient cerebral ischemia     Trigger ring finger     Viral gastroenteritis      Social History     Social History    Marital status:       Spouse name: N/A    Number of children: N/A    Years of education: N/A     Social History Main Topics    Smoking status: Never Smoker    Smokeless tobacco: Never Used    Alcohol use No    Drug use: No    Sexual activity: No     Other Topics Concern    None     Social History Narrative    None       Subjective         Objective    Physical Exam:   Assessment Type: (P) Assess only  General Appearance: (P) Alert, Awake  Respiratory Pattern: (P) Labored, Accessory muscle use, Tachypneic  Chest Assessment: (P) Chest expansion symmetrical  Bilateral Breath Sounds: (P) Crackles    Vitals:  Blood pressure (!) 171/84, pulse (P) 98, temperature (!) 97 4 °F (36 3 °C), temperature source Axillary, resp  rate (!) 38, SpO2 95 %, not currently breastfeeding  Results from last 7 days  Lab Units 03/15/18  0840   ZAY TEST  Postive Zay Test       Imaging and other studies: I have personally reviewed pertinent reports  Plan    Respiratory Plan: (P) Vent/NIV/HFNC        Resp Comments: Pt was placed on BIPAP at this time due to increased SOB  Pt appears tachypnic but states that her WOB feels better on BIPAP  Pt is coughing up frothy red tinged secretions  Pt chart was reviewed per resp salma  Pt has a HX of asthma, she will be orderd on PRN bronchodilator at this time  Pt will continue on BIPAP settings as tolerated  Will continue to monitor throughout shift

## 2018-03-15 NOTE — PROGRESS NOTES
03/15/18 17892 [address]   Spiritual Beliefs/Perceptions   Support Systems Children;Family members   Plan of Care   Comments Attended Medical Emergency; contacted pt family and left messages; provided ministry of presence   Assessment Completed by: Unit visit

## 2018-03-15 NOTE — ED NOTES
Upon arrival to the ED, patient is is severe respiratory distress, coughing up pink frothy sputum       Keena Merlos RN  03/15/18 8558

## 2018-03-15 NOTE — ED NOTES
Patient brought over from CT scan  Respiratory at bedside  Dr Janes Brown at bedside    Nelly Jordan at [employer], RN  03/15/18 1100

## 2018-03-15 NOTE — ED NOTES
Patient had 16 beats of Vtach on the monitor  Patient alert and oriented  MD Cain aware        Cece Hopper RN  03/15/18 3125

## 2018-03-15 NOTE — H&P
History and Physical - Critical Care   Miko Keller 76 y o  female MRN: [id number]  Unit/Bed#: ED 26 Encounter: [id number]      Assessment:   Miko Keller is a 76 y o  female with PMH of CAD s/p CABG, severe aortic stenosis, diastolic CHF with EF 19%, PAF on coumadin, DM on insulin, CKD, PAD, HTN, HLD, hypothyroidism, asthma and GERD who presented to the ED on 3/15 as a medical alert from CT scan, found to have acute CHF exacerbation  Plan:     Neuro:  No acute issues    CV:   Afib with RVR  History of atrial flutter s/p DC cardioversion in 2016  Rate control with metoprolol 50mg po qam, 100mg po qpm   Patient held coumadin for 2 days at home in preparation for upcoming ROBB  Most recent PT/INR 24 1/2  14  Continue coumadin 6mg po Tuesday, 3mg po all other days  Acute on chronic diastolic CHF  Most recent echo as per [facility] on March 2017  EF 89-71% with diastolic dysfunction, dilated left atrium  ntBNP on admission 39626  Trop 0 56  Will trend x2 more  Continue lasix 40mg po bid, supplemental oxygen  CAD s/p cabg  Continue aspirin 81mg po daily  Continue lipitor 80mg po daily    Aortic Stenosis  As per echo from 2017, aortic valve area is 0 4 cm^2  Mean gradient 27 3  Patient is currently being seen by CT surgery for TAVR evaluation  Plan for inpatient cardiac cath and ROBB during this admission as per cardiology    Pulm:   Acute hypoxemic respiratory failure  Secondary to flash pulmonary edema  Currently on bipap, will titrate down to HFNC and NC as appropriate  Continue respiratory protocol    Asthma  Continue flovent, albuterol, tessalon perles  GI:   No acute issues     :   LEONARD on CKD  BUN/Cr on admission 28/1  40  Likely secondary to contrast nephropathy from IV dye  Holding home lisinopril at this time  Monitor BMPs  Strict I/Os    F/E/N:   Fluids: none   Electrolytes: replete potassium with kdur  Nutrition: npo except for sips with meds    ID:   Afebrile   WBC elevated to 15, likely reactive to stress  Low suspicion for infection at this time  Heme:   Continue coumadin as per above  Endo:   DM2  A1c on 12/19/17 was 7 7  Will recheck during this admission  Continue lantus 30units BID  Monitor blood sugars and continue correctional insulin  Hypothyroidism, hx of thyroidectomy  TSH on 12/19/17 was 0 763  Continue synthroid 100mcg po daily     Msk/Skin:   Patient is mobile at home  OOB to chair  Disposition: Continue ICU care      Reason for Admission / Chief Complaint:  Shortness of breath    History of Present Illness:  Jeol Bravo is a 76 y o  female with PMH of CAD s/p CABG, severe aortic stenosis, diastolic CHF with EF 81%, PAF on coumadin, DM on insulin, CKD, PAD, HTN, HLD, hypothyroidism, asthma and GERD who presented to the ED on 3/15 as a medical alert from CT scan  She was scheduled to get CT chest for evaluation of future TAVR  Patient is allergic to contrast dye and was pretreated the day prior  Shortly after the scan, she developed severe dyspnea, was noted to be coughing up pink sputum  In the ED, found to be tachycardic, with significant crackles on lung exam  Patient was also in afib with RVR  Labs notable for K 3 3, BUN/Cr 28/1 4, glucose 389, WBC 15  Trop 0 56, proBNP [id number], PT/INR 24 1/2  14  She was started on bipap and given 5mg lopressor IV as well as 80mg lasix and phenergan with improvement with respiratory symptoms  She was subsequently admitted to ICU for further management      Past Medical History:  Past Medical History:   Diagnosis Date    Altered mental status     Anticoagulated     Coumadin for Aflutter    Asthma     Atrial flutter ([address] )     maintained on coumadin anticoag    CAD (coronary artery disease)     Candidiasis     Carotid stenosis, bilateral     Cataract     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) (HCC)     Chronic fatigue syndrome     Chronic low back pain     Concussion w loss of consciousness of unsp duration, init     Coronary artery disease     Diabetes mellitus ([facility] [address]     type 2, insulin dependent    DJD (degenerative joint disease)     GERD (gastroesophageal reflux disease)     Herpes zoster     HLD (hyperlipidemia)     HTN (hypertension)     Hyperlipidemia     Hypothyroidism     Irritable bowel syndrome     Lumbar radiculopathy     Lyme disease     Dx in hospital 8/2011    Lyme disease     MI (myocardial infarction)     Migraines     Muscle spasm     Non-neoplastic nevus     Nontoxic multinodular goiter     OA (osteoarthritis)     Osteoarthritis     Other chronic pain     PAD (peripheral artery disease) (HCC)     Palpitations     Pseudogout     Spinal stenosis     Transient cerebral ischemia     Trigger ring finger     Viral gastroenteritis        Past Surgical History:  Past Surgical History:   Procedure Laterality Date    APPENDECTOMY      ARTERIOGRAM  12/19/2017    CARDIAC CATHETERIZATION      CHOLECYSTECTOMY      COLONOSCOPY      CORONARY ARTERY BYPASS GRAFT  2004    LUMBAR LAMINECTOMY      THYROIDECTOMY      TOTAL ABDOMINAL HYSTERECTOMY W/ BILATERAL SALPINGOOPHORECTOMY         Past Family History:  Family History   Problem Relation Age of Onset    Cancer Mother     Stroke Mother     Cancer Father     Heart disease Father     Breast cancer Sister     Diabetes Daughter      Passed away January 2017        Social History:  History   Smoking Status    Never Smoker   Smokeless Tobacco    Never Used     History   Alcohol Use No     History   Drug Use No     Marital Status:     Medications:  Current Facility-Administered Medications   Medication Dose Route Frequency    esmolol (BREVIBLOC) IV bolus 39 mg  0 5 mg/kg (Order-Specific) Intravenous Once     Home medications:  Prior to Admission medications    Medication Sig Start Date End Date Taking?  Authorizing Provider   aspirin 81 MG tablet Take 81 mg by mouth daily   Yes Historical Provider, MD   atorvastatin (LIPITOR) 80 mg tablet Take 80 mg by mouth daily   Yes Historical Provider, MD   BANOPHEN 50 MG capsule TAKE ONE CAPSULE 1 HOUR PRIOR TO TEST 2/21/18  Yes Historical Provider, MD   benzonatate (TESSALON PERLES) 100 mg capsule Take 1 capsule (100 mg total) by mouth 3 (three) times a day as needed for cough 3/12/18  Yes Brenda Bajwa MD   calcium carbonate (TUMS) 500 mg chewable tablet Chew as needed   Yes Historical Provider, MD   cholecalciferol (VITAMIN D3) 1,000 units tablet Take 1,000 mg by mouth daily 10/20/16  Yes Historical Provider, MD   diphenhydrAMINE (BENADRYL) 50 MG tablet 50mg one hour prior to test 2/21/18  Yes Petey Dominique PA-C   esomeprazole (NexIUM) 20 mg capsule 20 mg daily   Yes Historical Provider, MD   furosemide (LASIX) 40 mg tablet Take 40 mg by mouth daily   Yes Historical Provider, MD   guaiFENesin (MUCINEX) 600 mg 12 hr tablet Take 2 tablets (1,200 mg total) by mouth every 12 (twelve) hours 3/12/18  Yes Brenda Bajwa MD   LANTUS 100 UNIT/ML subcutaneous injection Inject 30 Units under the skin IN THE MORNING AND 30 UNITS AT BEDTIME 10/31/17  Yes Historical Provider, MD   levothyroxine 100 mcg tablet Take 100 mcg by mouth daily   Yes Historical Provider, MD   lisinopril (ZESTRIL) 5 mg tablet Take 5 mg by mouth daily at bedtime   Yes Historical Provider, MD   loratadine (CLARITIN) 10 mg tablet Take 1 tablet (10 mg total) by mouth daily 3/12/18  Yes Brenda Bajwa MD   Magnesium Oxide 400 MG CAPS Take 400 mg by mouth 2 (two) times a day 8/31/16  Yes Historical Provider, MD   metFORMIN (GLUCOPHAGE) 1000 MG tablet 1,000 mg 2 (two) times a day   Yes Historical Provider, MD   methylPREDNISolone (MEDROL) 32 MG tablet 32mg twelve hour prior to test, 32mg two hours prior to test 2/21/18  Yes Petey Dominique PA-C   metoprolol succinate (TOPROL-XL) 100 mg 24 hr tablet 100 mg daily at bedtime   Yes Historical Provider, MD   metoprolol succinate (TOPROL-XL) 50 mg 24 hr tablet Take 1 tablet by mouth daily with breakfast 9/1/16  Yes Historical Provider, MD   Mometasone Furoate [facility] HFA) 100 MCG/ACT AERO Inhale 2 puffs once daily 3/12/18  Yes Adriel Francisco MD   warfarin (COUMADIN) 3 mg tablet Take 3 mg by mouth daily 6 mg tuesdays, 3 mg all other days 6/27/16  Yes Historical Provider, MD   albuterol (VENTOLIN HFA) 90 mcg/act inhaler Inhale 108 mcg 2 (two) times a day as needed 2 puffs bid PRN    Historical Provider, MD   nitroglycerin (NITROSTAT) 0 4 mg SL tablet Place 0 4 mg under the tongue every 3 (three) minutes as needed 5/11/15   Historical Provider, MD PRATT INS SYR ULTRAFINE 1CC/31G 31G X 5/16" 1 ML MISC Use as directed 2/19/18 3/15/18  Historical Provider, MD     Allergies: Allergies   Allergen Reactions    Other Chest Pain     IVP-listed as "chest pain" in previous chart, patient stated this occurred "a long time ago" but does not remember exactly occurred     Cephalosporins Chest Pain    Doxycycline Chest Pain    Levaquin [Levofloxacin] Chest Pain    Ondansetron Chest Pain     Prolonged QT    Toradol [Ketorolac Tromethamine] Chest Pain       ROS:   Review of Systems   Constitutional: Negative for chills, diaphoresis, fatigue, fever and unexpected weight change  HENT: Negative for sinus pain  Eyes: Negative for visual disturbance  Respiratory: Positive for cough, shortness of breath, wheezing and stridor  Negative for apnea, choking and chest tightness  Cardiovascular: Positive for chest pain and palpitations  Negative for leg swelling  Gastrointestinal: Negative for abdominal distention, abdominal pain, constipation, diarrhea, nausea and vomiting  Genitourinary: Negative for difficulty urinating, dysuria and enuresis  Musculoskeletal: Negative for myalgias  Skin: Negative for rash  Neurological: Negative for dizziness, tremors, seizures, syncope, weakness, light-headedness, numbness and headaches  Hematological: Bruises/bleeds easily  Vitals:  Vitals:    03/15/18 1050 03/15/18 1104 03/15/18 1107 03/15/18 1114   BP: (!) 171/84      Pulse:    98   Resp:       Temp:   (!) 97 4 °F (36 3 °C)    TempSrc:   Axillary    SpO2:  95%  95%     Temperature:   Temp (24hrs), Av 4 °F (36 3 °C), Min:97 4 °F (36 3 °C), Max:97 4 °F (36 3 °C)    Current Temperature: (!) 97 4 °F (36 3 °C)    Weights: There is no height or weight on file to calculate BMI  Hemodynamic Monitoring:  PAP:  , PAP mean:   , CVP:  , PCWP:   , SvO2:   , ScvO2:  , CO:  , CI:  , SVR:  , SVRI:  , PVR:  , SV:  , SVI:  , ICP Mean:  , CPP:       Non-Invasive/Invasive Ventilation Settings:  Respiratory    Lab Data (Last 4 hours)    None         O2/Vent Data (Last 4 hours)      03/15 1114 03/15 1130        Non-Invasive Ventilation Mode BiPAP BiPAP                No results found for: PHART, FSH6XPE, PO2ART, DQL8CUO, K6GRCMMA, BEART, SOURCE  SpO2: SpO2: 97 %, SpO2 Activity:  , SpO2 Device: O2 Device:  (bipap), Capnography:       Physical Exam:  General: lying in bed wearing bipap in no acute distress  HEENT: bipap mask on  Perrl, eomi  Cardiovascular: tachycardic   Systolic ejection murmur heard at rusb  Pulmonary: inspiratory crackles in all lung fields bilaterally  Abdominal: soft, nontender, nondistended  Extremities: no pitting edema  Skin: warm and dry  Neuro: no focal deficits    Labs:    Results from last 7 days  Lab Units 03/15/18  1121 03/15/18  0840   WBC Thousand/uL 15 45*  --    HEMOGLOBIN g/dL 12 2  --    I STAT HEMOGLOBIN g/dl  --  11 6   HEMATOCRIT % 40 1  --    PLATELETS Thousands/uL 472*  --    NEUTROS PCT % 81*  --    MONOS PCT % 1*  --        Results from last 7 days  Lab Units 03/15/18  1121 03/15/18  0840   SODIUM mmol/L 134*  --    POTASSIUM mmol/L 3 3*  --    CHLORIDE mmol/L 99*  --    CO2 mmol/L 19*  --    BUN mg/dL 28*  --    CREATININE mg/dL 1 40*  --    CALCIUM mg/dL 7 4*  --    ANION GAP mmol/L 16*  --    GLUCOSE RANDOM mg/dL 389*  --    GLUCOSE, ISTAT mg/dl  --  317*                      ABG:No results found for: PHART, TNE7IDZ, PO2ART, FIS7DWF, T6NZXFCP, BEART, SOURCE  VBG:      Imaging: I have personally reviewed pertinent reports  No results found  EKG: This was personally reviewed by myself  Micro:  No results found for: Amber Neumann, SPUTUMCULTUR      VTE Pharmacologic Prophylaxis: Warfarin (Coumadin)  VTE Mechanical Prophylaxis: sequential compression device    Invasive lines and devices: Invasive Devices     Peripheral Intravenous Line            Peripheral IV 03/15/18 Left Hand less than 1 day    Peripheral IV 03/15/18 Right Antecubital less than 1 day                Code Status: Prior  POA:    POLST:      Given critical illness, patient length of stay will require greater than two midnights  Portions of the record may have been created with voice recognition software  Occasional wrong word or "sound a like" substitutions may have occurred due to the inherent limitations of voice recognition software  Read the chart carefully and recognize, using context, where substitutions have occurred        Chantell Birch MD

## 2018-03-15 NOTE — CONSULTS
Consultation - Cardiology   Bertis Councilman 76 y o  female MRN: [id number]  Unit/Bed#: MICU 05 Encounter: [id number]      Assessment and Plan    Acute on chronic diastolic heart failure  -The last echocardiogram from March 2017 at [facility] revealed LV ejection fraction of 55%, moderate to severe LVH , elevated filling pressures with E/e' was 23 77  - she has been maintained on Lasix 40 mg p  o  daily and she notes compliance with low-salt diet and medications  Acute worsening of heart failure likely precipitated by severe aortic stenosis  - she has severe pulmonary edema on x-ray, proBNP is 12 681, hypoxic and dyspneic requiring BiPAP  -received 80 mg IV Lasix, agree with continue 40 mg IV b i d  [facility] -would repeat an echocardiogram to evaluate LV function  - monitor close Is and Os, daily weights    Severe aortic stenosis  CHER by VTI was 0 4 cm2, aortic valve mean gradient was 27 3 mmHg in March 2017   - she is severely symptomatic with shortness of breath on minimal exertion  - seen by CT surgery outpatient, undergoing TAVR evaluation  Discussed with CT tierra PA , after medical stabilization she can undergo inpatient cardiac catheterization/ROBB that she was initially scheduled for as a part of tavr workup  - She also has moderate mitral stenosis with a mean gradient of 6 2 mmHg,      Persistent atrial fibrillation as per previous record  Presented with RVR,  She is currently in sinus rhythm after 5 mg IV metoprolol  -she also has a history of typical atrial flutter status post DC cardioversion in 2016  Would resume her home dose of metoprolol   -on warfarin, INR is 2 14      CAD  -Status post CABG, lima to LAD, sequential SVG to D1, D2, D3, sequential SVG to OM1, SVG to PDA  -Cardiac catheterization in 2016 revealed large RCA with moderate disease, mild circumflex disease, severe LAD disease  There was a patent BASSETT to LAD, a sequential SVG supplying a diagonal and 2 marginal branches    The saphenous vein graft was totally occluded after the 2nd marginal   - stable CAD with no ongoing angina   -Would continue aspirin, atorvastatin 80, metoprolol      Hypertension   Blood pressure at goal on metoprolol  Lisinopril held due to Geovanna  dyslipidemia  Lipid panel in July 2017 with total cholesterol of 147, LDL 63, HDL 42, triglyceride 210  On atorvastatin 80    Peripheral vascular disease  Bilateral carotid artery stenosis, 20-49% bilaterally        History of Present Illness   Physician Requesting Consult: Bret Roberts,   Reason for Consult / Principal Problem:   HPI: Corinna Rodriguez is a 76y o  year old female with a past medical history of CAD status post CABG x6 in 2004, baseline LBBB, hypertension, hyperlipidemia, diabetes, known severe aortic stenosis diagnosed in 2016, moderate MS, atrial flutter status post DC cardioversion in 2016, peripheral arterial disease, bilateral carotid stenosis, who presented to the hospital for pre TAVR workup  She had extreme shortness of breath while she was in the CT scan today for TAVR workup  Shortness of breath started after she received the contrast and as per patient, and she started coughing up pink sputum  Notes she has previously had chest pain after contrast administration  She was in her normal state of health this morning before the procedure  At baseline she is able to walk only 1 block without getting short of breath  She has 2 pillow orthopnea, no PND  No swelling in her legs  She denies any chest pain  No recent fevers chills  She was then evaluated by the emergency room physician, was found to be in AFib with RVR to 140s, was given metoprolol 5 mg IV after which she converted to a normal sinus rhythm  Her chest x-ray then showed severe bilateral pulmonary edema at which point decision was made to admit her  Patient has a known history of severe aortic stenosis as per previous reports and echo from [facility]    Her last echocardiogram from [facility] revealed severe aortic stenosis with an AV mean gradient of 27 3 mmHg, CHER by VTI was 0 4 cm2,  She also had a moderate mitral stenosis with a mean gradient of 6 2 mmHg, LV ejection fraction was 55-60%, left atrium was severely dilated  She followed up with Dr Argentina James to discuss TAVR, but in view of her relatively small femoral vessels and not very symptomatic nature of her aortic stenosis at that time, decision was made to monitor her with follow-up echoes  She recently transferred care to [facility], and started follow-up with Dr Mario Moe referred her to [address] [place] [address] for a TAVR evaluation  EKG on presentation revealed atrial fibrillation with RVR to 120s, left bundle-branch block         Inpatient consult to Cardiology     Performed by  Lilliana Gonzalez     Authorized by Arti HOUGH              Review of Systems:  Review of Systems    As per HPI     Historical Information   Past Medical History:   Diagnosis Date    Altered mental status     Anticoagulated     Coumadin for Aflutter    Asthma     Atrial flutter ([facility] [address] )     maintained on coumadin anticoag    CAD (coronary artery disease)     Candidiasis     Carotid stenosis, bilateral     Cataract     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) (HCC)     Chronic fatigue syndrome     Chronic low back pain     Concussion w loss of consciousness of unsp duration, init     Coronary artery disease     Diabetes mellitus ([facility] [address] )     type 2, insulin dependent    DJD (degenerative joint disease)     GERD (gastroesophageal reflux disease)     Herpes zoster     HLD (hyperlipidemia)     HTN (hypertension)     Hyperlipidemia     Hypothyroidism     Irritable bowel syndrome     Lumbar radiculopathy     Lyme disease     Dx in hospital 8/2011    Lyme disease     MI (myocardial infarction)     Migraines     Muscle spasm     Non-neoplastic nevus     Nontoxic multinodular goiter     OA (osteoarthritis)     Osteoarthritis  Other chronic pain     PAD (peripheral artery disease) (HCC)     Palpitations     Pseudogout     Spinal stenosis     Transient cerebral ischemia     Trigger ring finger     Viral gastroenteritis      Past Surgical History:   Procedure Laterality Date    APPENDECTOMY      ARTERIOGRAM  12/19/2017    CARDIAC CATHETERIZATION      CHOLECYSTECTOMY      COLONOSCOPY      CORONARY ARTERY BYPASS GRAFT  2004    LUMBAR LAMINECTOMY      THYROIDECTOMY      TOTAL ABDOMINAL HYSTERECTOMY W/ BILATERAL SALPINGOOPHORECTOMY       History   Alcohol Use No     History   Drug Use No     History   Smoking Status    Never Smoker   Smokeless Tobacco    Never Used     Family History: non-contributory    Meds/Allergies   PTA meds:   Prior to Admission Medications   Prescriptions Last Dose Informant Patient Reported? Taking?    BANOPHEN 50 MG capsule 3/15/2018 at Unknown time  Yes Yes   Sig: TAKE ONE CAPSULE 1 HOUR PRIOR TO TEST   LANTUS 100 UNIT/ML subcutaneous injection 3/14/2018 at Unknown time Self Yes Yes   Sig: Inject 30 Units under the skin IN THE MORNING AND 30 UNITS AT BEDTIME   Magnesium Oxide 400 MG CAPS 3/14/2018 at Unknown time Self Yes Yes   Sig: Take 400 mg by mouth 2 (two) times a day   Mometasone Furoate [facility]) 100 MCG/ACT AERO 3/14/2018 at Unknown time  No Yes   Sig: Inhale 2 puffs once daily   albuterol (VENTOLIN HFA) 90 mcg/act inhaler More than a month at Unknown time Self Yes No   Sig: Inhale 108 mcg 2 (two) times a day as needed 2 puffs bid PRN   aspirin 81 MG tablet 3/14/2018 at Unknown time Self Yes Yes   Sig: Take 81 mg by mouth daily   atorvastatin (LIPITOR) 80 mg tablet 3/14/2018 at Unknown time Self Yes Yes   Sig: Take 80 mg by mouth daily   benzonatate (TESSALON PERLES) 100 mg capsule Past Week at Unknown time  No Yes   Sig: Take 1 capsule (100 mg total) by mouth 3 (three) times a day as needed for cough   calcium carbonate (TUMS) 500 mg chewable tablet Past Week at Unknown time Self Yes Yes   Sig: Chew as needed   cholecalciferol (VITAMIN D3) 1,000 units tablet 3/14/2018 at Unknown time Self Yes Yes   Sig: Take 1,000 mg by mouth daily   diphenhydrAMINE (BENADRYL) 50 MG tablet Past Month at Unknown time  No Yes   Simg one hour prior to test   esomeprazole (NexIUM) 20 mg capsule 3/14/2018 at Unknown time Self Yes Yes   Si mg daily   furosemide (LASIX) 40 mg tablet 3/14/2018 at Unknown time Self Yes Yes   Sig: Take 40 mg by mouth daily   guaiFENesin (MUCINEX) 600 mg 12 hr tablet Past Week at Unknown time  No Yes   Sig: Take 2 tablets (1,200 mg total) by mouth every 12 (twelve) hours   levothyroxine 100 mcg tablet 3/14/2018 at Unknown time Self Yes Yes   Sig: Take 100 mcg by mouth daily   lisinopril (ZESTRIL) 5 mg tablet 3/14/2018 at Unknown time Self Yes Yes   Sig: Take 5 mg by mouth daily at bedtime   loratadine (CLARITIN) 10 mg tablet Past Week at Unknown time  No Yes   Sig: Take 1 tablet (10 mg total) by mouth daily   metFORMIN (GLUCOPHAGE) 1000 MG tablet 3/14/2018 at Unknown time Self Yes Yes   Si,000 mg 2 (two) times a day   methylPREDNISolone (MEDROL) 32 MG tablet 3/15/2018 at Unknown time  No Yes   Simg twelve hour prior to test, 32mg two hours prior to test   metoprolol succinate (TOPROL-XL) 100 mg 24 hr tablet 3/14/2018 at Unknown time Self Yes Yes   Si mg daily at bedtime   metoprolol succinate (TOPROL-XL) 50 mg 24 hr tablet 3/14/2018 at Unknown time Self Yes Yes   Sig: Take 1 tablet by mouth daily with breakfast   nitroglycerin (NITROSTAT) 0 4 mg SL tablet More than a month at Unknown time Self Yes No   Sig: Place 0 4 mg under the tongue every 3 (three) minutes as needed   warfarin (COUMADIN) 3 mg tablet Past Week at Unknown time Self Yes Yes   Sig: Take 3 mg by mouth daily 6 mg , 3 mg all other days      Facility-Administered Medications: None     Allergies   Allergen Reactions    Other Chest Pain     IVP-listed as "chest pain" in previous chart, patient stated this occurred "a long time ago" but does not remember exactly occurred     Cephalosporins Chest Pain    Contrast [Iodinated Diagnostic Agents] Other (See Comments)     Flash pulm edema    Doxycycline Chest Pain    Levaquin [Levofloxacin] Chest Pain    Ondansetron Chest Pain     Prolonged QT    Toradol [Ketorolac Tromethamine] Chest Pain       Objective   Vitals: Blood pressure 117/73, pulse 100, temperature 98 °F (36 7 °C), temperature source Oral, resp  rate (!) 36, weight 77 3 kg (170 lb 6 7 oz), SpO2 95 %, not currently breastfeeding , Body mass index is 29 25 kg/m² , Orthostatic Blood Pressures    Flowsheet Row Most Recent Value   Blood Pressure  117/73 filed at 03/15/2018 1348            Intake/Output Summary (Last 24 hours) at 03/15/18 1353  Last data filed at 03/15/18 1301   Gross per 24 hour   Intake                0 ml   Output              225 ml   Net             -225 ml       Invasive Devices     Peripheral Intravenous Line            Peripheral IV 03/15/18 Left Hand less than 1 day    Peripheral IV 03/15/18 Right Antecubital less than 1 day                      Physical Exam    Gen: In mild distress, is on BiPAP  HEENT: anicteric, mucous membranes moist  Neck:  Mild JVD   Heart:  Regular, 2/6 systolic ejection murmur   Lungs :  Diffuse bilateral crackles  Abdomen: soft nontender, normoactive bowel sounds, no organomegaly  Ext:  Trace edema  Skin: warm, no rashes  Neuro: AAO x 3, no focal findings  Psychiatric: normal affect  Musculoskeletal: no obvious joint deformities      Lab Results:     Lab Results   Component Value Date    CKTOTAL 83 06/09/2017    CKTOTAL 127 02/10/2015    TROPONINI 2 38 (H) 12/20/2017    TROPONINI 2 85 (H) 12/20/2017    TROPONINI 2 88 (H) 12/20/2017       Lab Results   Component Value Date    GLUCOSE 389 (H) 03/15/2018    CALCIUM 7 4 (L) 03/15/2018     (L) 03/15/2018    K 3 3 (L) 03/15/2018    CO2 19 (L) 03/15/2018    CL 99 (L) 03/15/2018    BUN 28 (H) 03/15/2018    CREATININE 1 40 (H) 03/15/2018       Lab Results   Component Value Date    WBC 15 45 (H) 03/15/2018    HGB 12 2 03/15/2018    HCT 40 1 03/15/2018    MCV 70 (L) 03/15/2018     (H) 03/15/2018       Lab Results   Component Value Date    CHOL 147 07/14/2017    CHOL 140 11/18/2016    CHOL 143 08/04/2016     Lab Results   Component Value Date    HDL 42 07/14/2017    HDL 52 11/18/2016    HDL 43 08/04/2016     Lab Results   Component Value Date    LDLCALC 63 07/14/2017    LDLCALC 50 11/18/2016    LDLCALC 64 08/04/2016     Lab Results   Component Value Date    TRIG 210 (H) 07/14/2017    TRIG 189 (H) 11/18/2016    TRIG 178 (H) 08/04/2016       Lab Results   Component Value Date    ALT 25 12/20/2017    AST 27 12/20/2017               Imaging: I have personally reviewed pertinent reports

## 2018-03-15 NOTE — ED PROVIDER NOTES
History  Chief Complaint   Patient presents with    Respiratory Distress     patient is  a medical emergency from [address]  Soon after her procedure, after laying flat for CT, she had a sudden onset of severe respiratory distress  Patient was pre medicated for the procedure with 2 doses of solumedrol secondary to prior reaction to IV dy      77 yo female presents as a medical emergency from CT scan where she was having an outpt staging CT done for her TAVR  She has a contrast allergy so she was pretreated but after scan she developed severe respiratory distress and was coughing up pink frothy sputum  I was called immediately to the room to evaluate  Lungs with diffuse crackles bilaterally  Monitor shows afib with RVR  Pt was placed on BiPAP with improvement in symptoms  Pt given lasix 80mg IVP with improvement in symptoms  I spoke to dr Reyna Brantley from cardiology who recommended giving metoprolol 5mg IV for her afib with RVR in setting of critical AS  Pt was given metoprolol 5mg IV with decrease in HR to 90s  BP initially 171/84 decreased into 110s after metoprolol  Pt states she feels much better  Had some nausea for which we gave phenergan 12 5mg IV due to her QTc of 530ms  Discussed with dr tello [facility]) regarding admission as pts status is highly tenuous and is at greatly increased risk for decompensation from her afib w/RVR combined with critical AS and fluid overload  Prior to Admission Medications   Prescriptions Last Dose Informant Patient Reported? Taking?    BANOPHEN 50 MG capsule 3/15/2018 at Unknown time  Yes Yes   Sig: TAKE ONE CAPSULE 1 HOUR PRIOR TO TEST   LANTUS 100 UNIT/ML subcutaneous injection 3/14/2018 at Unknown time Self Yes Yes   Sig: Inject 30 Units under the skin IN THE MORNING AND 30 UNITS AT BEDTIME   Magnesium Oxide 400 MG CAPS 3/14/2018 at Unknown time Self Yes Yes   Sig: Take 400 mg by mouth 2 (two) times a day   Mometasone Furoate [facility] HFA) 100 MCG/ACT AERO 3/14/2018 at Unknown time  No Yes   Sig: Inhale 2 puffs once daily   albuterol (VENTOLIN HFA) 90 mcg/act inhaler More than a month at Unknown time Self Yes No   Sig: Inhale 108 mcg 2 (two) times a day as needed 2 puffs bid PRN   aspirin 81 MG tablet 3/14/2018 at Unknown time Self Yes Yes   Sig: Take 81 mg by mouth daily   atorvastatin (LIPITOR) 80 mg tablet 3/14/2018 at Unknown time Self Yes Yes   Sig: Take 80 mg by mouth daily   benzonatate (TESSALON PERLES) 100 mg capsule Past Week at Unknown time  No Yes   Sig: Take 1 capsule (100 mg total) by mouth 3 (three) times a day as needed for cough   calcium carbonate (TUMS) 500 mg chewable tablet Past Week at Unknown time Self Yes Yes   Sig: Chew as needed   cholecalciferol (VITAMIN D3) 1,000 units tablet 3/14/2018 at Unknown time Self Yes Yes   Sig: Take 1,000 mg by mouth daily   diphenhydrAMINE (BENADRYL) 50 MG tablet Past Month at Unknown time  No Yes   Simg one hour prior to test   esomeprazole (NexIUM) 20 mg capsule 3/14/2018 at Unknown time Self Yes Yes   Si mg daily   furosemide (LASIX) 40 mg tablet 3/14/2018 at Unknown time Self Yes Yes   Sig: Take 40 mg by mouth daily   guaiFENesin (MUCINEX) 600 mg 12 hr tablet Past Week at Unknown time  No Yes   Sig: Take 2 tablets (1,200 mg total) by mouth every 12 (twelve) hours   levothyroxine 100 mcg tablet 3/14/2018 at Unknown time Self Yes Yes   Sig: Take 100 mcg by mouth daily   lisinopril (ZESTRIL) 5 mg tablet 3/14/2018 at Unknown time Self Yes Yes   Sig: Take 5 mg by mouth daily at bedtime   loratadine (CLARITIN) 10 mg tablet Past Week at Unknown time  No Yes   Sig: Take 1 tablet (10 mg total) by mouth daily   metFORMIN (GLUCOPHAGE) 1000 MG tablet 3/14/2018 at Unknown time Self Yes Yes   Si,000 mg 2 (two) times a day   methylPREDNISolone (MEDROL) 32 MG tablet 3/15/2018 at Unknown time  No Yes   Simg twelve hour prior to test, 32mg two hours prior to test   metoprolol succinate (TOPROL-XL) 100 mg 24 hr tablet 3/14/2018 at Unknown time Self Yes Yes   Si mg daily at bedtime   metoprolol succinate (TOPROL-XL) 50 mg 24 hr tablet 3/14/2018 at Unknown time Self Yes Yes   Sig: Take 1 tablet by mouth daily with breakfast   nitroglycerin (NITROSTAT) 0 4 mg SL tablet More than a month at Unknown time Self Yes No   Sig: Place 0 4 mg under the tongue every 3 (three) minutes as needed   warfarin (COUMADIN) 3 mg tablet Past Week at Unknown time Self Yes Yes   Sig: Take 3 mg by mouth daily 6 mg , 3 mg all other days      Facility-Administered Medications: None       Past Medical History:   Diagnosis Date    Altered mental status     Anticoagulated     Coumadin for Aflutter    Asthma     Atrial flutter (HCC)     maintained on coumadin anticoag    CAD (coronary artery disease)     Candidiasis     Carotid stenosis, bilateral     Cataract     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) [facility]     Chronic fatigue syndrome     Chronic low back pain     Concussion w loss of consciousness of unsp duration, init     Coronary artery disease     Diabetes mellitus (HCC)     type 2, insulin dependent    DJD (degenerative joint disease)     GERD (gastroesophageal reflux disease)     Herpes zoster     HLD (hyperlipidemia)     HTN (hypertension)     Hyperlipidemia     Hypothyroidism     Irritable bowel syndrome     Lumbar radiculopathy     Lyme disease     Dx in hospital 2011    Lyme disease     MI (myocardial infarction)     Migraines     Muscle spasm     Non-neoplastic nevus     Nontoxic multinodular goiter     OA (osteoarthritis)     Osteoarthritis     Other chronic pain     PAD (peripheral artery disease) ([facility])     Palpitations     Pseudogout     Spinal stenosis     Transient cerebral ischemia     Trigger ring finger     Viral gastroenteritis        Past Surgical History:   Procedure Laterality Date    APPENDECTOMY      ARTERIOGRAM  2017   [facility] CARDIAC CATHETERIZATION      CHOLECYSTECTOMY      COLONOSCOPY      CORONARY ARTERY BYPASS GRAFT  2004    LUMBAR LAMINECTOMY      THYROIDECTOMY      TOTAL ABDOMINAL HYSTERECTOMY W/ BILATERAL SALPINGOOPHORECTOMY         Family History   Problem Relation Age of Onset    Cancer Mother     Stroke Mother     Cancer Father     Heart disease Father     Breast cancer Sister     Diabetes Daughter      Passed away January 2017      I have reviewed and agree with the history as documented  Social History   Substance Use Topics    Smoking status: Never Smoker    Smokeless tobacco: Never Used    Alcohol use No        Review of Systems   Respiratory: Positive for cough and shortness of breath  Negative for wheezing  Gastrointestinal: Positive for nausea and vomiting  Skin: Negative for color change and rash  All other systems reviewed and are negative  Physical Exam  ED Triage Vitals   Temperature Pulse Respirations Blood Pressure SpO2   03/15/18 1107 03/15/18 1045 03/15/18 1045 03/15/18 1050 03/15/18 1104   (!) 97 4 °F (36 3 °C) (!) 140 (!) 38 (!) 171/84 95 %      Temp Source Heart Rate Source Patient Position - Orthostatic VS BP Location FiO2 (%)   03/15/18 1107 -- -- -- --   Axillary          Pain Score       03/15/18 1045       3           Orthostatic Vital Signs  Vitals:    03/15/18 1045 03/15/18 1050 03/15/18 1114 03/15/18 1316   BP:  (!) 171/84     Pulse: (!) 140  98 102       Physical Exam   Constitutional: She appears well-developed and well-nourished  HENT:   Head: Normocephalic and atraumatic  Eyes: Conjunctivae and EOM are normal    Neck: Normal range of motion  Neck supple  No tracheal deviation present  Cardiovascular: Intact distal pulses  Murmur heard  Tachycardic, irregularly irregular  Pulmonary/Chest: No stridor  She has rales  Tripoding, coughing pink frothy sputum  tachypneic and using accessory muscles to breathe  Diffuse crackles noted bilaterally  Abdominal: Soft   Bowel sounds are normal  There is no tenderness  Musculoskeletal: Normal range of motion  She exhibits no edema  Neurological: She is alert  Skin: Skin is warm and dry  Capillary refill takes less than 2 seconds  No rash noted  Psychiatric: She has a normal mood and affect  Nursing note and vitals reviewed        ED Medications  Medications   aspirin chewable tablet 81 mg (not administered)   atorvastatin (LIPITOR) tablet 80 mg (not administered)   benzonatate (TESSALON PERLES) capsule 100 mg (not administered)   calcium carbonate (TUMS) chewable tablet 500 mg (not administered)   furosemide (LASIX) tablet 40 mg (not administered)   insulin glargine (LANTUS) subcutaneous injection 30 Units (not administered)   levothyroxine tablet 100 mcg (not administered)   loratadine (CLARITIN) tablet 10 mg (not administered)   magnesium oxide (MAG-OX) tablet 400 mg (not administered)   metoprolol succinate (TOPROL-XL) 24 hr tablet 100 mg (not administered)   metoprolol succinate (TOPROL-XL) 24 hr tablet 50 mg (not administered)   fluticasone (FLOVENT HFA) 110 MCG/ACT inhaler 1 puff (not administered)   chlorhexidine (PERIDEX) 0 12 % oral rinse 15 mL (not administered)   acetaminophen (TYLENOL) tablet 650 mg (not administered)   insulin lispro (HumaLOG) 100 units/mL subcutaneous injection 1-5 Units (not administered)   insulin lispro (HumaLOG) 100 units/mL subcutaneous injection 1-5 Units (not administered)   albuterol inhalation solution 2 5 mg (not administered)   pantoprazole (PROTONIX) EC tablet 20 mg (not administered)   furosemide (LASIX) injection 80 mg (80 mg Intravenous Given 3/15/18 1056)   promethazine (PHENERGAN) injection 12 5 mg (12 5 mg Intravenous Given 3/15/18 1113)   metoprolol (LOPRESSOR) injection 5 mg (5 mg Intravenous Given 3/15/18 1116)       Diagnostic Studies  Results Reviewed     Procedure Component Value Units Date/Time    NT-BNP PRO [[id number]] Collected:  03/15/18 1331    Lab Status:  No result Specimen:  Blood from Arm, Left     Troponin I [[id number]] Collected:  03/15/18 1331    Lab Status:  No result Specimen:  Blood from Arm, Left     Protime-INR [[id number]] Collected:  03/15/18 1331    Lab Status:  No result Specimen:  Blood from Arm, Left     Troponin I [[id number]]     Lab Status:  No result Specimen:  Blood     Basic metabolic panel [[id number]]  (Abnormal) Collected:  03/15/18 1121    Lab Status:  Final result Specimen:  Blood from Arm, Left Updated:  03/15/18 1202     Sodium 134 (L) mmol/L      Potassium 3 3 (L) mmol/L      Chloride 99 (L) mmol/L      CO2 19 (L) mmol/L      Anion Gap 16 (H) mmol/L      BUN 28 (H) mg/dL      Creatinine 1 40 (H) mg/dL      Glucose 389 (H) mg/dL      Calcium 7 4 (L) mg/dL      eGFR 36 ml/min/1 73sq m     Narrative:         National Kidney Disease Education Program recommendations are as follows:  GFR calculation is accurate only with a steady state creatinine  Chronic Kidney disease less than 60 ml/min/1 73 sq  meters  Kidney failure less than 15 ml/min/1 73 sq  meters      CBC and differential [[id number]]  (Abnormal) Collected:  03/15/18 1121    Lab Status:  Final result Specimen:  Blood from Arm, Left Updated:  03/15/18 1142     WBC 15 45 (H) Thousand/uL      RBC 5 73 (H) Million/uL      Hemoglobin 12 2 g/dL      Hematocrit 40 1 %      MCV 70 (L) fL      MCH 21 3 (L) pg      MCHC 30 4 (L) g/dL      RDW 17 9 (H) %      MPV 10 7 fL      Platelets 068 (H) Thousands/uL      nRBC 0 /100 WBCs      Neutrophils Relative 81 (H) %      Lymphocytes Relative 18 %      Monocytes Relative 1 (L) %      Eosinophils Relative 0 %      Basophils Relative 0 %      Neutrophils Absolute 12 39 (H) Thousands/µL      Lymphocytes Absolute 2 79 Thousands/µL      Monocytes Absolute 0 19 Thousand/µL      Eosinophils Absolute 0 02 Thousand/µL      Basophils Absolute 0 02 Thousands/µL                  XR chest 1 view portable   ED Interpretation by Isai Case DO (03/15 1304)   Abnormal   Severe diffuse bilateral pulmonary edema                 Procedures  ECG 12 Lead Documentation  Date/Time: 3/15/2018 12:03 PM  Performed by: Jairo Hobbs  Authorized by: Jairo Hobbs     Indications / Diagnosis:  Respiratory distress  ECG reviewed by me, the ED Provider: yes    Patient location:  ED  Interpretation:     Interpretation: abnormal    Rate:     ECG rate:  122    ECG rate assessment: tachycardic    Rhythm:     Rhythm: atrial fibrillation    Ectopy:     Ectopy: none    QRS:     QRS axis:  Left    QRS intervals: Wide  Conduction:     Conduction: normal    ST segments:     ST segments:  Normal  T waves:     T waves: normal             Phone Contacts  ED Phone Contact    ED Course  ED Course                                MDM  Number of Diagnoses or Management Options  Acute cardiogenic pulmonary edema [facility]):   Atrial fibrillation with rapid ventricular response [facility]):   Critical aortic valve stenosis:   Fluid overload:   Respiratory distress:   Diagnosis management comments: Bedside sono upon arrival demonstrates numerous B-lines in all lung fields both posterior and anterior  Unable to visualize heart well to determine EF  No obvious pericardial effusion noted  The patient presented with a condition in which there was a high probability of imminent or life-threatening deterioration, and critical care services (excluding separately billable procedures) totalled 30-74 minutes          Disposition  Final diagnoses:   Acute cardiogenic pulmonary edema (HCC)   Respiratory distress   Critical aortic valve stenosis   Fluid overload   Atrial fibrillation with rapid ventricular response ([address] )     Time reflects when diagnosis was documented in both MDM as applicable and the Disposition within this note     Time User Action Codes Description Comment    3/15/2018 11:51 AM Argelia Cain Add [I50 1] Acute cardiogenic pulmonary edema ([address] )     3/15/2018 11:51 AM Argelia Cain Add [R06 00] Respiratory distress     3/15/2018 11:51 AM Yasmine Cain Add [I35 0] Critical aortic valve stenosis     3/15/2018 11:51 AM Yasmine Cain Add [E87 70] Fluid overload     3/15/2018 11:52 AM Yasmine Cain Add [I48 91] Atrial fibrillation with rapid ventricular response [facility])       ED Disposition     ED Disposition Condition Comment    Admit  Case was discussed with [facility] and the patient's admission status was agreed to be Admission Status: inpatient status to the service of Dr Fili Saravia   Follow-up Information    None       Current Discharge Medication List      CONTINUE these medications which have NOT CHANGED    Details   aspirin 81 MG tablet Take 81 mg by mouth daily      atorvastatin (LIPITOR) 80 mg tablet Take 80 mg by mouth daily      BANOPHEN 50 MG capsule TAKE ONE CAPSULE 1 HOUR PRIOR TO TEST  Refills: 0      benzonatate (TESSALON PERLES) 100 mg capsule Take 1 capsule (100 mg total) by mouth 3 (three) times a day as needed for cough  Qty: 20 capsule, Refills: 0    Associated Diagnoses: URI, acute      calcium carbonate (TUMS) 500 mg chewable tablet Chew as needed      cholecalciferol (VITAMIN D3) 1,000 units tablet Take 1,000 mg by mouth daily      diphenhydrAMINE (BENADRYL) 50 MG tablet 50mg one hour prior to test  Qty: 1 tablet, Refills: 0    Associated Diagnoses:  Aortic stenosis, severe      esomeprazole (NexIUM) 20 mg capsule 20 mg daily      furosemide (LASIX) 40 mg tablet Take 40 mg by mouth daily      guaiFENesin (MUCINEX) 600 mg 12 hr tablet Take 2 tablets (1,200 mg total) by mouth every 12 (twelve) hours  Qty: 20 tablet, Refills: 0    Associated Diagnoses: URI, acute      LANTUS 100 UNIT/ML subcutaneous injection Inject 30 Units under the skin IN THE MORNING AND 30 UNITS AT BEDTIME  Refills: 3      levothyroxine 100 mcg tablet Take 100 mcg by mouth daily      lisinopril (ZESTRIL) 5 mg tablet Take 5 mg by mouth daily at bedtime      loratadine (CLARITIN) 10 mg tablet Take 1 tablet (10 mg total) by mouth daily  Qty: 10 tablet, Refills: 0    Associated Diagnoses: URI, acute      Magnesium Oxide 400 MG CAPS Take 400 mg by mouth 2 (two) times a day      metFORMIN (GLUCOPHAGE) 1000 MG tablet 1,000 mg 2 (two) times a day      methylPREDNISolone (MEDROL) 32 MG tablet 32mg twelve hour prior to test, 32mg two hours prior to test  Qty: 2 tablet, Refills: 0    Associated Diagnoses: Aortic stenosis, severe      !! metoprolol succinate (TOPROL-XL) 100 mg 24 hr tablet 100 mg daily at bedtime      !! metoprolol succinate (TOPROL-XL) 50 mg 24 hr tablet Take 1 tablet by mouth daily with breakfast      Mometasone Furoate (ASMANEX HFA) 100 MCG/ACT AERO Inhale 2 puffs once daily  Qty: 1 Inhaler, Refills: 5    Associated Diagnoses: Moderate asthma without complication, unspecified whether persistent      warfarin (COUMADIN) 3 mg tablet Take 3 mg by mouth daily 6 mg tuesdays, 3 mg all other days      albuterol (VENTOLIN HFA) 90 mcg/act inhaler Inhale 108 mcg 2 (two) times a day as needed 2 puffs bid PRN      nitroglycerin (NITROSTAT) 0 4 mg SL tablet Place 0 4 mg under the tongue every 3 (three) minutes as needed       !! - Potential duplicate medications found  Please discuss with provider  No discharge procedures on file      ED Provider  Electronically Signed by           Shanna Cain DO  03/15/18 [id number]

## 2018-03-15 NOTE — ED NOTES
Attempted to call report to MICU, no RN available to take report   Will call back       Zaina Becerril RN  03/15/18 7896

## 2018-03-16 ENCOUNTER — APPOINTMENT (INPATIENT)
Dept: NON INVASIVE DIAGNOSTICS | Facility: HOSPITAL | Age: 78
DRG: 280 | End: 2018-03-16
Payer: MEDICARE

## 2018-03-16 ENCOUNTER — APPOINTMENT (INPATIENT)
Dept: RADIOLOGY | Facility: HOSPITAL | Age: 78
DRG: 280 | End: 2018-03-16
Payer: MEDICARE

## 2018-03-16 PROBLEM — J96.01 ACUTE RESPIRATORY FAILURE WITH HYPOXIA (HCC): Status: RESOLVED | Noted: 2018-03-16 | Resolved: 2018-03-16

## 2018-03-16 PROBLEM — J45.909 ASTHMA: Status: ACTIVE | Noted: 2018-03-16

## 2018-03-16 PROBLEM — J96.01 ACUTE RESPIRATORY FAILURE WITH HYPOXIA (HCC): Status: ACTIVE | Noted: 2018-03-16

## 2018-03-16 LAB
ALBUMIN SERPL BCP-MCNC: 3.1 G/DL (ref 3.5–5)
ALP SERPL-CCNC: 65 U/L (ref 46–116)
ALT SERPL W P-5'-P-CCNC: 23 U/L (ref 12–78)
ANION GAP SERPL CALCULATED.3IONS-SCNC: 8 MMOL/L (ref 4–13)
AST SERPL W P-5'-P-CCNC: 32 U/L (ref 5–45)
ATRIAL RATE: 73 BPM
BASOPHILS # BLD AUTO: 0.02 THOUSANDS/ΜL (ref 0–0.1)
BASOPHILS NFR BLD AUTO: 0 % (ref 0–1)
BILIRUB SERPL-MCNC: 1.46 MG/DL (ref 0.2–1)
BUN SERPL-MCNC: 30 MG/DL (ref 5–25)
CALCIUM SERPL-MCNC: 7 MG/DL (ref 8.3–10.1)
CHLORIDE SERPL-SCNC: 105 MMOL/L (ref 100–108)
CO2 SERPL-SCNC: 28 MMOL/L (ref 21–32)
CREAT SERPL-MCNC: 0.83 MG/DL (ref 0.6–1.3)
EOSINOPHIL # BLD AUTO: 0.05 THOUSAND/ΜL (ref 0–0.61)
EOSINOPHIL NFR BLD AUTO: 0 % (ref 0–6)
ERYTHROCYTE [DISTWIDTH] IN BLOOD BY AUTOMATED COUNT: 17.9 % (ref 11.6–15.1)
GFR SERPL CREATININE-BSD FRML MDRD: 67 ML/MIN/1.73SQ M
GLUCOSE SERPL-MCNC: 122 MG/DL (ref 65–140)
GLUCOSE SERPL-MCNC: 131 MG/DL (ref 65–140)
GLUCOSE SERPL-MCNC: 139 MG/DL (ref 65–140)
GLUCOSE SERPL-MCNC: 208 MG/DL (ref 65–140)
GLUCOSE SERPL-MCNC: 217 MG/DL (ref 65–140)
GLUCOSE SERPL-MCNC: 218 MG/DL (ref 65–140)
HCT VFR BLD AUTO: 30.7 % (ref 34.8–46.1)
HGB BLD-MCNC: 9.6 G/DL (ref 11.5–15.4)
HGB BLD-MCNC: 9.8 G/DL (ref 11.5–15.4)
INR PPP: 2.06 (ref 0.86–1.16)
LYMPHOCYTES # BLD AUTO: 2.08 THOUSANDS/ΜL (ref 0.6–4.47)
LYMPHOCYTES NFR BLD AUTO: 14 % (ref 14–44)
MAGNESIUM SERPL-MCNC: 1.2 MG/DL (ref 1.6–2.6)
MAGNESIUM SERPL-MCNC: 3.3 MG/DL (ref 1.6–2.6)
MCH RBC QN AUTO: 21.2 PG (ref 26.8–34.3)
MCHC RBC AUTO-ENTMCNC: 31.3 G/DL (ref 31.4–37.4)
MCV RBC AUTO: 68 FL (ref 82–98)
MONOCYTES # BLD AUTO: 1.36 THOUSAND/ΜL (ref 0.17–1.22)
MONOCYTES NFR BLD AUTO: 9 % (ref 4–12)
NEUTROPHILS # BLD AUTO: 11.17 THOUSANDS/ΜL (ref 1.85–7.62)
NEUTS SEG NFR BLD AUTO: 77 % (ref 43–75)
NRBC BLD AUTO-RTO: 0 /100 WBCS
P AXIS: -5 DEGREES
PHOSPHATE SERPL-MCNC: 4.5 MG/DL (ref 2.3–4.1)
PLATELET # BLD AUTO: 328 THOUSANDS/UL (ref 149–390)
PMV BLD AUTO: 10.1 FL (ref 8.9–12.7)
POTASSIUM SERPL-SCNC: 4.5 MMOL/L (ref 3.5–5.3)
PR INTERVAL: 296 MS
PROT SERPL-MCNC: 6.7 G/DL (ref 6.4–8.2)
PROTHROMBIN TIME: 23.4 SECONDS (ref 12.1–14.4)
QRS AXIS: -63 DEGREES
QRSD INTERVAL: 146 MS
QT INTERVAL: 471 MS
QTC INTERVAL: 520 MS
RBC # BLD AUTO: 4.53 MILLION/UL (ref 3.81–5.12)
SODIUM SERPL-SCNC: 141 MMOL/L (ref 136–145)
T WAVE AXIS: 131 DEGREES
TROPONIN I SERPL-MCNC: 2.02 NG/ML
TROPONIN I SERPL-MCNC: 2.31 NG/ML
VENTRICULAR RATE: 73 BPM
WBC # BLD AUTO: 14.72 THOUSAND/UL (ref 4.31–10.16)

## 2018-03-16 PROCEDURE — 82948 REAGENT STRIP/BLOOD GLUCOSE: CPT

## 2018-03-16 PROCEDURE — 93880 EXTRACRANIAL BILAT STUDY: CPT

## 2018-03-16 PROCEDURE — 84100 ASSAY OF PHOSPHORUS: CPT | Performed by: STUDENT IN AN ORGANIZED HEALTH CARE EDUCATION/TRAINING PROGRAM

## 2018-03-16 PROCEDURE — 83735 ASSAY OF MAGNESIUM: CPT | Performed by: NURSE PRACTITIONER

## 2018-03-16 PROCEDURE — 99233 SBSQ HOSP IP/OBS HIGH 50: CPT | Performed by: INTERNAL MEDICINE

## 2018-03-16 PROCEDURE — 93306 TTE W/DOPPLER COMPLETE: CPT | Performed by: INTERNAL MEDICINE

## 2018-03-16 PROCEDURE — 99232 SBSQ HOSP IP/OBS MODERATE 35: CPT | Performed by: INTERNAL MEDICINE

## 2018-03-16 PROCEDURE — 84484 ASSAY OF TROPONIN QUANT: CPT | Performed by: EMERGENCY MEDICINE

## 2018-03-16 PROCEDURE — 80053 COMPREHEN METABOLIC PANEL: CPT | Performed by: STUDENT IN AN ORGANIZED HEALTH CARE EDUCATION/TRAINING PROGRAM

## 2018-03-16 PROCEDURE — 93010 ELECTROCARDIOGRAM REPORT: CPT | Performed by: INTERNAL MEDICINE

## 2018-03-16 PROCEDURE — 85025 COMPLETE CBC W/AUTO DIFF WBC: CPT | Performed by: STUDENT IN AN ORGANIZED HEALTH CARE EDUCATION/TRAINING PROGRAM

## 2018-03-16 PROCEDURE — 93880 EXTRACRANIAL BILAT STUDY: CPT | Performed by: SURGERY

## 2018-03-16 PROCEDURE — 84484 ASSAY OF TROPONIN QUANT: CPT | Performed by: INTERNAL MEDICINE

## 2018-03-16 PROCEDURE — 93005 ELECTROCARDIOGRAM TRACING: CPT | Performed by: STUDENT IN AN ORGANIZED HEALTH CARE EDUCATION/TRAINING PROGRAM

## 2018-03-16 PROCEDURE — 99221 1ST HOSP IP/OBS SF/LOW 40: CPT | Performed by: PHYSICIAN ASSISTANT

## 2018-03-16 PROCEDURE — 85610 PROTHROMBIN TIME: CPT | Performed by: STUDENT IN AN ORGANIZED HEALTH CARE EDUCATION/TRAINING PROGRAM

## 2018-03-16 PROCEDURE — 71045 X-RAY EXAM CHEST 1 VIEW: CPT

## 2018-03-16 PROCEDURE — 94760 N-INVAS EAR/PLS OXIMETRY 1: CPT

## 2018-03-16 PROCEDURE — 83735 ASSAY OF MAGNESIUM: CPT | Performed by: STUDENT IN AN ORGANIZED HEALTH CARE EDUCATION/TRAINING PROGRAM

## 2018-03-16 PROCEDURE — 85018 HEMOGLOBIN: CPT | Performed by: NURSE PRACTITIONER

## 2018-03-16 RX ORDER — MAGNESIUM SULFATE HEPTAHYDRATE 40 MG/ML
4 INJECTION, SOLUTION INTRAVENOUS ONCE
Status: COMPLETED | OUTPATIENT
Start: 2018-03-16 | End: 2018-03-17

## 2018-03-16 RX ORDER — MAGNESIUM SULFATE 1 G/100ML
1 INJECTION INTRAVENOUS ONCE
Status: COMPLETED | OUTPATIENT
Start: 2018-03-16 | End: 2018-03-16

## 2018-03-16 RX ORDER — INSULIN GLARGINE 100 [IU]/ML
15 INJECTION, SOLUTION SUBCUTANEOUS EVERY 12 HOURS SCHEDULED
Status: DISCONTINUED | OUTPATIENT
Start: 2018-03-16 | End: 2018-03-17

## 2018-03-16 RX ADMIN — FLUTICASONE PROPIONATE 1 PUFF: 110 AEROSOL, METERED RESPIRATORY (INHALATION) at 21:17

## 2018-03-16 RX ADMIN — Medication 400 MG: at 09:10

## 2018-03-16 RX ADMIN — FUROSEMIDE 40 MG: 40 TABLET ORAL at 16:35

## 2018-03-16 RX ADMIN — METOPROLOL SUCCINATE 100 MG: 100 TABLET, EXTENDED RELEASE ORAL at 21:15

## 2018-03-16 RX ADMIN — CHLORHEXIDINE GLUCONATE 15 ML: 1.2 RINSE ORAL at 09:07

## 2018-03-16 RX ADMIN — INSULIN LISPRO 4 UNITS: 100 INJECTION, SOLUTION INTRAVENOUS; SUBCUTANEOUS at 23:53

## 2018-03-16 RX ADMIN — LORATADINE 10 MG: 10 TABLET ORAL at 09:07

## 2018-03-16 RX ADMIN — ATORVASTATIN CALCIUM 80 MG: 80 TABLET, FILM COATED ORAL at 17:15

## 2018-03-16 RX ADMIN — INSULIN GLARGINE 15 UNITS: 100 INJECTION, SOLUTION SUBCUTANEOUS at 21:16

## 2018-03-16 RX ADMIN — CHLORHEXIDINE GLUCONATE 15 ML: 1.2 RINSE ORAL at 21:16

## 2018-03-16 RX ADMIN — PANTOPRAZOLE SODIUM 40 MG: 40 TABLET, DELAYED RELEASE ORAL at 05:00

## 2018-03-16 RX ADMIN — FLUTICASONE PROPIONATE 1 PUFF: 110 AEROSOL, METERED RESPIRATORY (INHALATION) at 09:08

## 2018-03-16 RX ADMIN — ASPIRIN 81 MG 81 MG: 81 TABLET ORAL at 09:10

## 2018-03-16 RX ADMIN — LEVOTHYROXINE SODIUM 100 MCG: 100 TABLET ORAL at 05:01

## 2018-03-16 RX ADMIN — Medication 400 MG: at 17:15

## 2018-03-16 RX ADMIN — INSULIN LISPRO 4 UNITS: 100 INJECTION, SOLUTION INTRAVENOUS; SUBCUTANEOUS at 13:00

## 2018-03-16 RX ADMIN — VITAMIN D, TAB 1000IU (100/BT) 1000 UNITS: 25 TAB at 09:10

## 2018-03-16 RX ADMIN — FUROSEMIDE 40 MG: 40 TABLET ORAL at 07:58

## 2018-03-16 RX ADMIN — MAGNESIUM SULFATE HEPTAHYDRATE 1 G: 1 INJECTION, SOLUTION INTRAVENOUS at 08:13

## 2018-03-16 RX ADMIN — MAGNESIUM SULFATE IN WATER 4 G: 40 INJECTION, SOLUTION INTRAVENOUS at 09:49

## 2018-03-16 RX ADMIN — METOPROLOL SUCCINATE 50 MG: 50 TABLET, FILM COATED, EXTENDED RELEASE ORAL at 07:36

## 2018-03-16 NOTE — RESTORATIVE TECHNICIAN NOTE
Restorative Specialist Mobility Note       Activity: Chair, Ambulate in morrison, Ambulate in room, Stand at bedside (Educated/encouraged pt to ambulate with assistance 3-4 x's/day   Pt callbell, phone/tray within reach )     Assistive Device: Front wheel walker (NC O2 on 2L)          Alexys PERALTA, Restorative Technician, [employer]

## 2018-03-16 NOTE — PROGRESS NOTES
Progress Note - Critical Care   Cathern Hamman 76 y o  female MRN: [id number]  Unit/Bed#: MICU 05 Encounter: [id number]    Attending Physician: Germania Ventura DO      ______________________________________________________________________  Principal Problem:    Acute pulmonary edema ([address] )  Active Problems:    PAD (peripheral artery disease) (HCC)    HTN (hypertension)    Aortic stenosis, severe    CAD (coronary artery disease)    DM (diabetes mellitus) type II uncontrolled, periph vascular disorder (HCC)    Atrial flutter (HCC)    Hyperlipidemia    GERD (gastroesophageal reflux disease)    Moderate mitral stenosis  Resolved Problems:    * No resolved hospital problems  *    Assessment:   Cathern Hamman is a 76 y o  female with PMH of CAD s/p CABG, severe aortic stenosis, diastolic CHF with EF 89%, PAF on coumadin, DM on insulin, CKD, PAD, HTN, HLD, hypothyroidism, asthma and GERD who presented to the ED on 3/15 as a medical alert from CT scan, found to have acute hypoxic respiratory distress secondary to acute chf exacerbation      Plan:     Neuro:  No acute issues     CV:   Acute on chronic diastolic CHF  Most recent echo as per [facility] records on March 2017  EF 51-62% with diastolic dysfunction, dilated left atrium  ntBNP on admission 40258  Last 3 trops 2 2, 2 31, 2 02  TTE performed 3/15  F/u formal read  Continue lasix 40mg po bid, supplemental oxygen  Afib with RVR  History of atrial flutter s/p DC cardioversion in 2016  Rate control with metoprolol 50mg po qam, 100mg po qpm   Held coumadin since 3/14 in preparation for upcoming cath/ROBB  Most recent PT/INR 24 1/2  14      CAD s/p cabg  Continue aspirin 81mg po daily  Continue lipitor 80mg po daily  To cath lab on 3/16     Aortic Stenosis  As per echo from 2017, aortic valve area is 0 4 cm^2  Mean gradient 27 3  Patient is currently being seen by CT surgery for TAVR evaluation     Plan for inpatient ROBB during this admission as per cardiology, possible consult to CT surg  Prolonged QTc  EKG on 3/16 shows NSR with 1st degree block, LBBB  QTc 519  Hold QTc prolonging meds     Pulm:   Acute hypoxemic respiratory failure  Secondary to pulmonary edema  Currently 5L nasal cannula and saturating well  Continue respiratory protocol     Asthma  Continue flovent, albuterol, tessalon perles       GI:   No acute issues      :   LEONARD on CKD  BUN/Cr on admission 28/1  40  Likely secondary to contrast nephropathy from IV dye  Most recently improved to 30/0  83  Holding home lisinopril at this time  Monitor BMPs  Strict I/Os     F/E/N:   Fluids: none   Electrolytes: magnesium w/ mag sulfate  Nutrition: npo except for sips with meds     ID: Afebrile  WBC improved from 15 to 14  Likely reactive to stress  Low suspicion for infection at this time      Heme:   Holding coumadin pending cardiac cath     Endo:   DM2  A1c on 12/19/17 was 7 7  Will recheck during this admission  Holding home lantus bid while patient is NPO  Monitor blood sugars and continue correctional insulin      Hypothyroidism, hx of thyroidectomy  TSH on 12/19/17 was 0 763  Continue synthroid 100mcg po daily                Msk/Skin:   Patient is mobile at home  OOB to chair      Disposition: Continue ICU care    Code Status: Level 1 - Full Code    ______________________________________________________________________    Chief Complaint:   Feels hungry    24 Hour Events:   Trop elevated and peaked at 2 31, most recently 2 02  Weaned off bipap onto nasal cannula  Otherwise no acute events     ______________________________________________________________________    Physical Exam:   General: asleep in bed but easily awakened  HEENT: wearing nasal cannula on 5L  Eomi  Wears dentures  Cardiovascular: systolic murmur heard best at rusb   Regular rate and rhythm  Pulmonary: diffuse inspiratory crackles in all lung fields, improved from yesterday  Abdominal: soft, nontender, nondistended  Extremities: no pitting edema  Skin: warm and dry  Neuro: no focal deficits    ______________________________________________________________________  Vitals:    18 0400 18 0500 18 0510 18 0600   BP: 97/61 108/60  117/64   Pulse: 72 74  80   Resp: (!) 24 (!) 29  (!) 25   Temp:       TempSrc:       SpO2: 96% 94%  97%   Weight:   76 kg (167 lb 8 8 oz)    Height:           Temperature:   Temp (24hrs), Av 9 °F (36 6 °C), Min:97 4 °F (36 3 °C), Max:98 1 °F (36 7 °C)    Current Temperature: 98 1 °F (36 7 °C)  Weights:   IBW: 54 7 kg    Body mass index is 28 76 kg/m²  Weight (last 2 days)     Date/Time   Weight    18 0510  76 (167 55)    03/15/18 1348  77 3 (170 42)    03/15/18 1302  77 3 (170 42)            Hemodynamic Monitoring:  PAP:  , PAP mean:   , CVP:  , PCWP:   , SvO2:   , ScvO2:  , CO:  , CI:  , SVR:  , SVRI:  , PVR:  , SV:  , SVI:  , ICP Mean:  , CPP:       Non-Invasive/Invasive Ventilation Settings:  Respiratory    Lab Data (Last 4 hours)    None         O2/Vent Data (Last 4 hours)    None              No results found for: PHART, MIK1NKP, PO2ART, KHM5AFY, E8FPMNVK, BEART, SOURCE  SpO2: SpO2: 97 %, SpO2 Activity:  , SpO2 Device: O2 Device:  (bipap), Capnography:    Intake and Outputs:  I/O       701 - 03/15 0700 03/15 07 -  07 07 -  0700    P  O   50     Total Intake(mL/kg)  50 (0 7)     Urine (mL/kg/hr)  1055     Total Output   1055      Net   -1005                     Nutrition:        Diet Orders            Start     Ordered    03/15/18 1528  Diet NPO; Sips with meds  Diet effective now     Question Answer Comment   Diet Type NPO    NPO Except: Sips with meds    RD to adjust diet per protocol? Yes        03/15/18 1529        Invasive lines and devices:   Invasive Devices     Peripheral Intravenous Line            Peripheral IV 03/15/18 Left Hand less than 1 day    Peripheral IV 03/15/18 Right Antecubital less than 1 day                 Labs:     Results from last 7 days  Lab Units 03/16/18  0506 03/15/18  1121 03/15/18  0840   WBC Thousand/uL 14 72* 15 45*  --    HEMOGLOBIN g/dL 9 6* 12 2  --    I STAT HEMOGLOBIN g/dl  --   --  11 6   HEMATOCRIT % 30 7* 40 1  --    PLATELETS Thousands/uL 328 472*  --    NEUTROS PCT % 77* 81*  --    MONOS PCT % 9 1*  --        Results from last 7 days  Lab Units 03/16/18  0506 03/15/18  1121 03/15/18  0840   SODIUM mmol/L 141 134*  --    POTASSIUM mmol/L 4 5 3 3*  --    CHLORIDE mmol/L 105 99*  --    CO2 mmol/L 28 19*  --    BUN mg/dL 30* 28*  --    CREATININE mg/dL 0 83 1 40*  --    CALCIUM mg/dL 7 0* 7 4*  --    TOTAL PROTEIN g/dL 6 7  --   --    BILIRUBIN TOTAL mg/dL 1 46*  --   --    ALK PHOS U/L 65  --   --    ALT U/L 23  --   --    AST U/L 32  --   --    GLUCOSE RANDOM mg/dL 122 389*  --    GLUCOSE, ISTAT mg/dl  --   --  317*       Results from last 7 days  Lab Units 03/16/18  0506   MAGNESIUM mg/dL 1 2*     Lab Results   Component Value Date    PHOS 4 5 (H) 03/16/2018    PHOS 5 2 (H) 12/19/2017        Results from last 7 days  Lab Units 03/15/18  1331   INR  2 14*       0  Lab Value Date/Time   TROPONINI 2 02 (H) 03/16/2018 0508   TROPONINI 2 31 (H) 03/16/2018 0208   TROPONINI 2 20 (H) 03/15/2018 2302   TROPONINI 1 98 (H) 03/15/2018 1938   TROPONINI 1 61 (H) 03/15/2018 1629   TROPONINI 0 56 (H) 03/15/2018 1331   TROPONINI 2 38 (H) 12/20/2017 0806   TROPONINI 2 85 (H) 12/20/2017 0537   TROPONINI 2 88 (H) 12/20/2017 0537   TROPONINI 1 34 (H) 12/19/2017 2141   TROPONINI 0 43 (H) 12/19/2017 1655   TROPONINI 0 02 12/19/2017 1424         ABG:No results found for: PHART, GWZ0NQR, PO2ART, TPL3IBP, L2VWVVOA, BEART, SOURCE  Imaging: I have personally reviewed pertinent reports  Xr Chest 1 View Portable    Result Date: 3/15/2018  Impression: Severe congestive heart failure bilaterally, worse compared to the prior exam  Findings concur with the preliminary ER interpretation   Workstation performed: [id number]     EKG:  Micro:  No results found for: Vivien Posadas, SPUTUMCULTUR  Allergies: Allergies   Allergen Reactions    Other Chest Pain     IVP-listed as "chest pain" in previous chart, patient stated this occurred "a long time ago" but does not remember exactly occurred     Cephalosporins Chest Pain    Contrast [Iodinated Diagnostic Agents] Other (See Comments)     Flash pulm edema    Doxycycline Chest Pain    Levaquin [Levofloxacin] Chest Pain    Ondansetron Chest Pain     Prolonged QT    Toradol [Ketorolac Tromethamine] Chest Pain     Medications:   Scheduled Meds:  Current Facility-Administered Medications:  acetaminophen 650 mg Oral Q6H PRN Anny Varghese MD   albuterol 2 5 mg Nebulization Q4H PRN Nayely Grullon DO   aspirin 81 mg Oral Daily Anny Varghese MD   atorvastatin 80 mg Oral Daily With Nadeem Valladares MD   benzonatate 100 mg Oral TID PRN Anny Varghese MD   calcium carbonate 500 mg Oral PRN Anny Varghese MD   chlorhexidine 15 mL Swish & Spit Q12H Isaias Mcgill MD   cholecalciferol 1,000 Units Oral Daily Anny Varghese MD   fluticasone 1 puff Inhalation Q12H [facility] HOME Anny Varghese MD   furosemide 40 mg Oral BID (diuretic) Solomon uMnoz MD   insulin lispro 2-12 Units Subcutaneous Q6H [facility] RAMO Bailey   levothyroxine 100 mcg Oral Early Morning Anny Varghese MD   loratadine 10 mg Oral Daily Anny Varghese MD   magnesium oxide 400 mg Oral BID Anny Varghese MD   metoprolol succinate 100 mg Oral HS Anny Varghese MD   metoprolol succinate 50 mg Oral Daily With Breakfast Anny Varghese MD   pantoprazole 40 mg Oral Early Morning RAMO Turcios     Continuous Infusions:   PRN Meds:    acetaminophen 650 mg Q6H PRN   albuterol 2 5 mg Q4H PRN   benzonatate 100 mg TID PRN   calcium carbonate 500 mg PRN     VTE Pharmacologic Prophylaxis: Warfarin (Coumadin)  VTE Mechanical Prophylaxis: sequential compression device    Portions of the record may have been created with voice recognition software    Occasional wrong word or "sound a like" substitutions may have occurred due to the inherent limitations of voice recognition software  Read the chart carefully and recognize, using context, where substitutions have occurred      Jamey Paniagua MD

## 2018-03-16 NOTE — PROGRESS NOTES
Progress Note - ICU Transfer to Step down/med  surg  Rose Dominguez 76 y o  female MRN: [id number]    [facility]: [id number]      Code Status: Level 1 - Full Code    Date of ICU admission: 3/15/18    Reason for ICU adm: acute hypoxemic respiratory failure secondary to pulmonary edema     Active problems:   Patient Active Problem List   Diagnosis    PAD (peripheral artery disease) ([facility] [address] )    HTN (hypertension)    Aortic valve stenosis, critical    CAD (coronary artery disease)    DM (diabetes mellitus) type II uncontrolled, periph vascular disorder (HCC)    Atrial flutter (HCC)    Hyperlipidemia    GERD (gastroesophageal reflux disease)    Moderate mitral stenosis    Acute combined systolic and diastolic congestive heart failure (HCC)    URI, acute    Arthritis of hand    Acute pulmonary edema (HCC)     Assessment:   Inge Ramos a 76 y o  female with PMH of CAD s/p CABG, severe aortic stenosis, diastolic CHF with EF 20%, PAF on coumadin, DM on insulin, CKD, PAD, HTN, HLD, hypothyroidism, asthma and GERD who presented to the ED on 3/15 as a medical alert from CT scan, found to have acute hypoxic respiratory distress secondary to acute chf exacerbation      Plan:      Neuro:  No acute issues     CV:   Acute on chronic diastolic CHF  TTE on 7/12 shows EF 35% with aortic valve area 0 4cm, severe mitral stenosis, and severe pulmonary hypertension  ntBNP on admission 57242  Last 3 trops 2 2, 2 31, 2 02  Continue lasix 40mg po bid, supplemental oxygen      Afib with RVR  History of atrial flutter s/p DC cardioversion in 2016    Tele strip overnight shows 4 episodes of NSVT  Rate control with metoprolol 50mg po qam, 100mg po qpm   Held coumadin since 3/14 in preparation for upcoming cath/ROBB and TAVR evaluation  Most recent INR 2 02      CAD s/p cabg  Continue aspirin 81mg po daily  Continue lipitor 80mg po daily  To cath lab on 3/16     Aortic Stenosis  Avoid afterload reduction  Patient is currently being seen by CT surgery for TAVR evaluation  Plan for inpatient ROBB during this admission as per cardiology, holding now pending clinical stability  CT surgery consulted     Prolonged QTc  EKG on 3/16 shows NSR with 1st degree block, LBBB  QTc 519  Hold QTc prolonging meds     Pulm:   Acute hypoxemic respiratory failure  Secondary to pulmonary edema  Currently 2L nasal cannula and saturating well  Wean to keep O2 > 88%  Continue respiratory protocol     Asthma  Continue flovent, albuterol, tessalon perles       GI:   No acute issues      :   LEONARD on CKD  BUN/Cr on admission 28/1  40  Likely secondary to contrast nephropathy from IV dye  Most recently improved to 30/0  83  Holding home lisinopril at this time  Monitor BMPs  Strict I/Os     F/E/N:   Fluids: none   Electrolytes: replete magnesium w/ mag sulfate  Nutrition: Continue diabetic diet     ID: Afebrile  WBC improved from 15 to 14  Likely reactive to stress  Low suspicion for infection at this time      Heme:   Held coumadin in anticipation of cardiac cath  Continue SCDs  Acute blood loss anemia  Hgb drop from 12 to 9 overnight  Likely secondary to hemoptysis  Patient did not receive any IVF, unlikely to be dilutational  She denies melanotic stools or hematuria  Continue to monitor CBC     Endo:   DM2  A1c on 12/19/17 was 7 7  F/u in the am  Holding home lantus 30 units bid for now  Monitor blood sugars and continue correctional insulin  Restart lantus at a lower dose pending true insulin requirements      Hypothyroidism, hx of thyroidectomy  TSH on 12/19/17 was 0 763    Continue synthroid 100mcg po daily     Msk/Skin:   Patient is mobile at home, but becomes short of breath after 1 flight of stairs/walking 1 block  PT/OT consulted     Disposition: Transfer to med/surg tele on P4    Summary of clinical course:   Cathern Hamman is a 76 y o  female with PMH of CAD s/p CABG, severe aortic stenosis, diastolic CHF with EF 60%, PAF on coumadin, DM on insulin, CKD, PAD, HTN, HLD, hypothyroidism, asthma and GERD who presented to the ED on 3/15 as a medical alert from CT scan  She was scheduled to get CT chest for evaluation of future TAVR  Patient is allergic to contrast dye and was pretreated the day prior  Shortly after the scan, she developed severe dyspnea, was noted to be coughing up pink sputum       In the ED, found to be tachycardic, with significant crackles on lung exam  Patient was also in afib with RVR  Labs notable for K 3 3, BUN/Cr 28/1 4, glucose 389, WBC 15  Trop 0 56, proBNP [id number], PT/INR 24 1/2  14  She was started on bipap and given 5mg lopressor IV as well as 80mg lasix and phenergan with improvement with respiratory symptoms  She was subsequently admitted to ICU for further management  Cardiology was consulted and recommended patient for ROBB and cardiac catheterization  She was made NPO and basal insulin was held  Patient was eventually weaned off bipap onto nasal cannula  Her troponins peaked to 2 31 overnight  The next day, she reported feeling better, but was noted to have a hgb drop of 12 to 9  She complained of one more episode of coughing up red/pink material  The decision was made by cardiology to hold off on the cath and ROBB until her respiratory status was significant improved  Patient was medically cleared for transfer to [facility] on P4 only  Recent or scheduled procedures:  TTE on 3/15 shows EF 35% with aortic valve area 0 4cm, severe mitral stenosis, and severe pulmonary hypertension  Cardiac catheterization in the future  ROBB in the future    Outstanding/pending diagnostics:   Vascular carotid study pending    Hospital discharge planning:    PCP is Dr Stefany Beasley  Patient is ambulatory at home but becomes short of breath after 1 block and 1 flight of stairs   PT/OT consulted

## 2018-03-16 NOTE — PROGRESS NOTES
IMR Unit Transfer Note  Unit/Bed # @DBLINK (NZCANDICE,91558)@ Encounter: [id number]  SOD Team C           Miko Keller 76 y o  female [id number]      Active Hospital Problems: Principal Problem: Aortic valve stenosis, critical  Active Problems:    PAD (peripheral artery disease) (HCC)    HTN (hypertension)    CAD (coronary artery disease)    DM (diabetes mellitus) type II uncontrolled, periph vascular disorder (HCC)    Atrial flutter (HCC)    Hyperlipidemia    GERD (gastroesophageal reflux disease)    Moderate mitral stenosis    Acute combined systolic and diastolic congestive heart failure (HCC)    Acute pulmonary edema (HCC)      VTE Pharmacologic Prophylaxis: Warfarin (Coumadin)  VTE Mechanical Prophylaxis: sequential compression device    Disposition: Patient requires Med/Surg with Telemetry    Hospital Course: This is a pleasant 15-year-old female with past medical history of CAD status post CABG, severe aortic stenosis, diastolic CHF, paroxysmal atrial fibrillation on Coumadin, type 2 diabetes mellitus on insulin, CKD, peripheral vascular disease, hypertension, hyperlipidemia, hypothyroidism, asthma and GERD who presented to the ED on 03/15/2018 as a medical emergency from the outpatient CT scan  The patient had been undergoing outpatient workup for TAVR and has a contrast dye allergy and had underwent pre treatment the day before  Directly after the CT scan the patient became acutely hypoxic with respiratory distress  She had severe dyspnea and was noted to be coughing up pink sputum  The patient was found to be volume overloaded with pulmonary edema likely secondary to acute CHF exacerbation  Patient was also found to be in AFib with RVR  Patient was administered IV 80 mg Lasix, 5 mg IV Lopressor and Phenergan with improvement of her heart rate and symptoms  Patient was placed on BiPAP and admitted to the MICU for further management      Notably, patient's initial labs showed potassium of 3 3, BUN 28, creatinine 1 4, glucose 389, WBC 15, troponin 0 56, pro BNP 69722, PT/INR 24 1/2  14     Cardiology was consulted as patient was recommended to have complete cardiac catheterization and ROBB  Her troponins peaked at 2 31 overnight  Cardiology determined that it would be prudent to hold off on cardiac catheterization and ROBB until respiratory status significantly improved  Overnight the patient was eventually weaned off BiPAP to nasal cannula  She is currently satting in the low 90s on 2L NC  The next day she was reportedly feeling better with the exception of 1 more episode of coughing up pink/red sputum  Patient was noted to have of drop in hemoglobin of 12 to 9 overnight  Subjective:  Patient reports that she feels significantly improved since yesterday  She still feels some crackling when trying to take a deep breath  She denies any current shortness of breath, chest pain, abdominal pain, nausea, vomiting or rectal bleeding  Patient notes that she has not had a bowel movement in over 48 hours which is not normal for her  Otherwise, patient has no concerns or complaints  Objective:  Vitals:    03/16/18 0800 03/16/18 0900 03/16/18 1000 03/16/18 1100   BP: 118/66 118/68 112/63 120/73   BP Location: Right arm      Pulse: 82 70 78 86   Resp: (!) 27 22 22 22   Temp: 98 1 °F (36 7 °C)      TempSrc: Oral      SpO2: 92% 92% 91% 94%   Weight:       Height:         I/O last 24 hours: In: 200 [P O :100; IV Piggyback:100]  Out: 1955 [[id number]]    Physical Exam   Constitutional: She is oriented to person, place, and time  She appears well-developed and well-nourished  No distress  HENT:   Head: Normocephalic and atraumatic  Eyes: EOM are normal  No scleral icterus  Neck: No tracheal deviation present  Cardiovascular: Normal rate and regular rhythm  Exam reveals no friction rub  Pulmonary/Chest: Effort normal  No stridor  She has rales  She exhibits no tenderness  Abdominal: Soft   Bowel sounds are normal  She exhibits no distension  There is no tenderness  There is no guarding  Musculoskeletal: She exhibits no edema or deformity  Neurological: She is alert and oriented to person, place, and time  Skin: Skin is warm and dry  No rash noted  Psychiatric: She has a normal mood and affect  Her behavior is normal    Vitals reviewed  Assessment and plan:    Acute hypoxic respiratory failure - secondary to pulmonary edema related to acute CHF exacerbation  Patient initially treated with BiPAP and was able to be weaned down to 2 L nasal cannula  Patient still has significant crackles on exam   -oxygen supplementation to maintain saturations above 88%  -continue respiratory protocol  -plan as outlined below for CHF exacerbation    Acute on chronic diastolic CHF - patient had previously had preserved ejection fraction of 60%  TTE on 03/15 shows EF of 35% with aortic valve area of 0 4 cm, severe mitral stenosis and severe pulmonary hypertension  pro-BNP > 12,000 on admission  Patient with pulmonary edema with active crackles on exam   -continue Lasix 40 mg p o  b i d  Per cardiology  -monitor input/output  -monitor daily weights    NSTEMI with CAD status post CABG- troponin peaked at 2 31  Likely NSTEMI type 2 secondary to acute CHF exacerbation  Cardiology will hold off on catheterization until respiratory status is more stable   -appreciate Cardiology recommendations  -continue aspirin 81 mg p o  Daily  -continue Lipitor 80 mg p o  daily    AFib with RVR - history of atrial flutter status post DC cardioversion in 2016  Telemetry strip showed a for episodes of nonsustained V-tach overnight  Patient is currently rate controlled with pulse in the 80s  -continue rate control with metoprolol 50 mg in the morning and 100 mg in the evening  -holding Coumadin since 03/14 in preparation for anticipated cath/ROBB  -recheck INR in the AM    Aortic stenosis - TTE on 03/15 shows aortic valve area of 0 4 cm    In addition TTE also showed severe mitral stenosis  Patient had been undergoing outpatient evaluation in preparation for TAVR  CT surgery was consulted and recommends outpatient follow-up once patient is stabilized and discharged  -carotid duplex is pending for the workup  -patient will need cardiac catheterization and ROBB when Cardiology determines that patient is stable enough  -follow up with CT surgery as an outpatient    Acute blood loss anemia - hemoglobin dropped from 12 2-9 6  Likely secondary to hemoptysis  -follow up on repeat hemoglobin at 3:00 p m  today  -monitor CBC closely    Prolonged QTC - EKG on 03/16 shows normal sinus rhythm with first-degree block, LBBB, QTc 519  -check EKG in the morning  -monitor on telemetry  -hold QTc prolonging medications    Asthma  -continue Flovent  -continue loratadine  -continue albuterol p r n  Type 2 diabetes mellitus - previous A1c 7 7% in December 2017  Patient's home regimen includes Lantus 30 units b i d  and metformin  While in the MICU patient's Lantus was held and was maintained on sliding scale insulin   -continue holding home metformin  -will restart Lantus at a lower dose of 15 units tonight, and titrate up as necessary  -continue sliding scale insulin  -check hemoglobin A1c    LEONARD in CKD - baseline creatinine appears to be from 0 8 to 0 9  Creatinine 1 4 on presentation now down to 0 8   -continue monitor BMP    Leukocytosis - WBC improved from 15-14  Likely secondary to stress reaction  Low suspicion for active infection at this time as patient has been afebrile   -continue to monitor CBC    Hypothyroidism - TSH on 12/19/2017 was 0 763  -continue Synthroid 100 mcg p o  daily    Peripheral vascular disease    Hypertension - currently well controlled with BP of 128/62   -continue metoprolol  -continue Lasix    Constipation - patient reports he has not had a bowel movement in over 48 hours which is not normal for her    She would like to try eating jaces with her lunch   -continue magnesium oxide b i d  GERD  -continue Protonix 40 mg daily  -continue Sharon Davis,

## 2018-03-16 NOTE — CASE MANAGEMENT
Initial Clinical Review    Admission: Date/Time/Statement: 3/15/18 @ 1153     Orders Placed This Encounter   Procedures    Inpatient Admission     Standing Status:   Standing     Number of Occurrences:   1     Order Specific Question:   Admitting Physician     Answer:   Joby Morillo     Order Specific Question:   Level of Care     Answer:   Critical Care [15]     Order Specific Question:   Estimated length of stay     Answer:   More than 2 Midnights     Order Specific Question:   Certification     Answer:   I certify that inpatient services are medically necessary for this patient for a duration of greater than two midnights  See H&P and MD Progress Notes for additional information about the patient's course of treatment  ED: Date/Time/Mode of Arrival:   ED Arrival Information     Expected Arrival Acuity Means of Arrival Escorted By Service Admission Type    - 3/15/2018 10:42 Immediate Walk-In Other General Medicine Emergency    Arrival Complaint    sob        Chief Complaint:   Chief Complaint   Patient presents with    Respiratory Distress     patient is  a medical emergency from [address]  Soon after her procedure, after laying flat for CT, she had a sudden onset of severe respiratory distress  Patient was pre medicated for the procedure with 2 doses of solumedrol secondary to prior reaction to IV dy  History of Illness: Cathern Hamman is a 76 y o  female with PMH of CAD s/p CABG, severe aortic stenosis, diastolic CHF with EF 24%, PAF on coumadin, DM on insulin, CKD, PAD, HTN, HLD, hypothyroidism, asthma and GERD who presented to the ED on 3/15 as a medical alert from CT scan  She was scheduled to get CT chest for evaluation of future TAVR  Patient is allergic to contrast dye and was pretreated the day prior  Shortly after the scan, she developed severe dyspnea, was noted to be coughing up pink sputum     In the ED, found to be tachycardic, with significant crackles on lung exam  Patient was also Insomnia - Stimulus Control  When someone lays awake in bed over many nights, your body can actually learn to be awake in bed, mainly because that is what has happened so many times.  Your body has actually been  conditioned  or trained to be awake during the night because it has happened so often.  To break this habit you should try to follow these steps to improve your insomnia:    Set a strict bedtime and rise time to keep every day of the week, including weekends    Go to bed at the set time, but only if you are sleepy (not tired or fatigued but drowsy)    Don t lay in bed for more than 15-30 minutes if you can t sleep.  Get up and go do something relaxing like reading or watching TV until you get drowsy again     Get up at the same time every day regardless of how much sleep you get    Use the bedroom only for sleep and sex.  Do NOT watch TV, read, use the computer, play on your cell phone or do work while in bed      Do not take naps during the daytime and avoid any situations where you might get drowsy or fall asleep unintentionally especially in the evening.     Stress, tension, and anxiety can be big factors contributing to insomnia.  If you can t relax your mind and body, then you won t be able to sleep.  You would likely benefit from trying some of the relaxation exercises that we ve assembled below.  These are all internet based links.  If you don t have or use a computer, you may benefit from one of the stress management books listed below that you can get at your public library or at a local bookstore for a relatively low price.      Copy and paste into web browser address window   https://www.youYoyoube.com/watch?v=Vsbl5jwNjVc    Progressive Muscle Relaxation (PMR):     http://www.[employer].com/progressive-muscle-relaxation-exercise.html     http://studentsupport.[facility]/counseling/resources/self-help/relaxation-and-stress-management/   Deep Breathing  Exercises:    http://www.[employer]/breathing-awareness.html     Meditation:     www.[employer]    www.[employer] medicguided-meditation-site.com You may have to pay for some of these resources.    Guided Imagery:    http://www.[employer]/guided-imagery-scripts.html     http://[employer]/library/gkcygohisp-gnamzc-bqmgvqh/    [place] site under construction : http://www.[employer].org/Services/SleepMedicine/Audio/index.htm    Sometimes insomnia can be made worse by our own habits or situation.  You should consider making some of the following changes to help improve your sleep:     If there is excessive noise in your house / neighborhood use a fan or similar device to make a quiet background noise that can drown out other noises    If your room is too bright early in the morning, hang a blanket or extra curtain over the windows to keep the light out or use a sleeping mask to cover your eyes    If your room is too warm during the night, set the temperature in the house down a few degrees for the night    Spend a little time before bedtime trying to relax.  Don t work right up until bedtime.  Take 30-60 minutes to relax and unwind before bedtime with a book or watching TV    Do not drink anything with alcohol or caffeine in them for at least 4-5 hours before bedtime    Do not smoke right before bedtime or during the night    No strenuous exercise for 2-3 hours before bedtime    A common problem for people with insomnia is spending too much time in bed  trying  to sleep.  You really should only be in bed for no more than 7-8 hours per night.  Spending too much time in bed can lead to being awake and having an  overactive  mind.  One effective way to address this problem is reducing how much time you spend in bed each night.  Be careful with driving or other dangerous activities when trying these strategeis however.  We recommend that you follow these steps to improve your insomnia:    Set a  in afib with RVR  Labs notable for K 3 3, BUN/Cr 28/1 4, glucose 389, WBC 15  Trop 0 56, proBNP [id number], PT/INR 24 1/2  14  She was started on bipap and given 5mg lopressor IV as well as 80mg lasix and phenergan with improvement with respiratory symptoms  She was subsequently admitted to ICU for further management      ED Vital Signs:   ED Triage Vitals   Temperature Pulse Respirations Blood Pressure SpO2   03/15/18 1107 03/15/18 1045 03/15/18 1045 03/15/18 1050 03/15/18 1104   (!) 97 4 °F (36 3 °C) (!) 140 (!) 38 (!) 171/84 95 %      Temp Source Heart Rate Source Patient Position - Orthostatic VS BP Location FiO2 (%)   03/15/18 1107 03/15/18 1600 03/16/18 0800 03/16/18 0800 --   Axillary Monitor Lying Right arm       Pain Score       03/15/18 1045       3        Wt Readings from Last 1 Encounters:   03/16/18 76 kg (167 lb 8 8 oz)     Vital Signs (abnormal):   03/16/18 1300  98 6 °F (37 °C)  90   31  103/53  69  93 %  --  --   03/16/18 1200  --  78   28  128/62  88  95 %  --  --   03/16/18 0800  98 1 °F (36 7 °C)  82   27  118/66  79  92 %  Nasal cannula  Lying   03/16/18 0700  --  --  --  --  --  --  Nasal cannula  --   03/16/18 0600  --  80   25  117/64  88  97 %  --  --   03/16/18 0500  --  74   29  108/60  80  94 %  --  --   03/16/18 0400  --  72   24  97/61  81  96 %  --  --   03/16/18 0300  --  74   26  102/61  78  98 %  --  --   03/16/18 0200  --  88   30  95/56  66  93 %  --  --   03/16/18 0100  --  80   25   94/46  64  93 %  --  --   03/16/18 0000  --  86   27   86/43  58  93 %  --  --   03/15/18 2300  --  88   29  106/61  87  92 %  --  --   03/15/18 2200  --  102   28  123/59  86  96 %  --  --   03/15/18 2100  --  100   29  98/58  78  94 %  --  --   03/15/18 2000  98 1 °F (36 7 °C)   110   35  135/80  104  97 %  --  --   03/15/18 1900  --  90   31  116/67  88  96 %  --  --   03/15/18 1800  --  102   33  138/78  101  95 %  --  --   03/15/18 1600  98 °F (36 7 °C)  88   29  107/61  79  95 %  --  --   03/15/18 1548  --  88   29  125/63  85  96 %  --  --   03/15/18 1518  --  92   29  109/58  73  95 %  --  --   03/15/18 1448  --  90   29  105/59  78  98 %  --  --   03/15/18 1418  --  98   30  129/88  111  97 %  --  --   03/15/18 1348  98 °F (36 7 °C)  100   36  117/73  84  95 %  --  --   03/15/18 1107   97 4 °F (36 3 °C)  --  --  --  --  --  --  --   03/15/18 1104  --  --  --  --  --  95 %  --  --   O2 Device: bi-pap at 03/15/18 1104   03/15/18 1050  --  --  --   171/84  --  --  --  --   03/15/18 1045  --   140   38  --  --  --  --  --        Abnormal Labs:   Ref Range & Units 03/15/18 0840   pCO2, Art i-STAT 36 0 - 44 0 mm HG 34 1     Calcium, Ionized i-STAT 1 12 - 1 32 mmol/L 0 91     Hct, i-STAT 34 8 - 46 1 % 34     Glucose, i-STAT 65 - 140 mg/dl 317     POC FIO2 L 21    Specimen Type   ARTERIAL    SITE   Right Radial       3/15 3/16   Sodium 134    141      Potassium 3 3 4 5   Chloride 99 105   CO2 19 28   Anion Gap 16 8   BUN 28 30   Creatinine 1 40 0 83   Glucose 389 122   Calcium 7 4 7 0   Albumin  3 1   Total Bilirubin  1 46   Phosphorus  4 5   Magnesium  1 2     Trop 0 56, 1 61, 1 98, 2 20, 2 31, 2 02  BNP 15392  PT/INR 24 1/2 14,  23 4/2 06  POC glucose 253, 220, 192, 208    RBC 5 73 4 53   Hemoglobin 12 2 9 6   Hematocrit 40 1 30 7   MCV 70 68   MCH 21 3 21 2   MCHC 30 4 31 3   RDW 17 9 17 9   Platelets 039 070     Diagnostic Test Results:     3/15 EKG - Atrial fibrillation with rapid ventricular response  Left axis deviation  Left bundle branch block  Abnormal ECG  When compared with ECG of 19-DEC-2017 14:26,  No significant change was found    3/16 EKG - Normal sinus rhythm with sinus arrhythmia with 1st degree A-V block  Left axis deviation  Left bundle branch block  Abnormal ECG  When compared with ECG of 15-MAR-2018 11:13,  Sinus rhythm has replaced Atrial fibrillation  Vent   rate has decreased BY  50 BPM  T wave inversion more evident in Lateral leads    3/15 CXR - Severe congestive heart failure new sleep schedule where you reduce the time you spend in bed by 1-2 hours, e.g. Go to bed at 10 and get up at 5    Keep this sleep schedule every day of the week including the weekends for two weeks.    After two weeks you can add 30-60 minutes more time in bed if you have been sleeping more soundly.    If you still aren t sleeping well, reduce the time you spend in bed by another 30-60 but not less than 5 hours per night.    Please keep a log or diary during this time to track your sleep pattern     bilaterally, worse compared to the prior exam     3/15 CTA chest abd, pelvis - results pending     3/15 Cardiac Echo - EF 39-82%  LV - Systolic function was moderately to markedly reduced  There was moderate diffuse hypokinesis  Wall thickness was moderately increased  There was moderate concentric hypertrophy  Vent septum - dyssynergic motion   RV mod dilated  LA mod to markedly dilated  RA mod dilated   MITRAL VALVE:  There was marked annular calcification  There was moderate-marked diffuse thickening of the anterior and posterior leaflets  There was moderate-marked calcification of the anterior and posterior leaflets  Transmitral velocity was increased due to valvular stenosis  There was moderate to severe stenosis (MVA 0 9 cm2/MG 7 mmHg)  There was mild regurgitation  AORTIC VALVE:  The valve was probably trileaflet  Leaflets exhibited markedly increased thickness and marked calcification  There was severe stenosis  There was mild regurgitation   TRICUSPID VALVE:  There was mild regurgitation  Estimated peak PA pressure was 69 mmHg  The findings suggest severe pulmonary hypertension  3/16 VAS carotid complete study - Bilateral carotid artery stenosis, 20-49% bilaterally     3/16 CXR - Significantly improved congestive changes  3/16 ROBB ordered     ED Treatment:   Medication Administration from 03/15/2018 1042 to 03/15/2018 1311    Date/Time Order Dose Route Action   03/15/2018 1056 furosemide (LASIX) injection 80 mg 80 mg Intravenous Given   03/15/2018 1113 promethazine (PHENERGAN) injection 12 5 mg 12 5 mg Intravenous Given   03/15/2018 1116 metoprolol (LOPRESSOR) injection 5 mg 5 mg Intravenous Given        Past Medical/Surgical History:    Active Ambulatory Problems     Diagnosis Date Noted    PAD (peripheral artery disease) ([facility] [address] ) 12/19/2017    HTN (hypertension) 12/19/2017    Aortic valve stenosis, critical 12/19/2017    CAD (coronary artery disease) 12/19/2017    DM (diabetes mellitus) type II uncontrolled, periph vascular disorder ([place] [address] ) 12/19/2017    Atrial flutter ([place] [address] ) 12/19/2017    Hyperlipidemia 12/19/2017    GERD (gastroesophageal reflux disease) 12/19/2017    Moderate mitral stenosis 12/19/2017    Acute combined systolic and diastolic congestive heart failure ([place] [address] ) 07/05/2016    URI, acute 03/12/2018    Arthritis of hand 12/26/2014     Resolved Ambulatory Problems     Diagnosis Date Noted    NSTEMI (non-ST elevated myocardial infarction) ([place] [address] ) 12/19/2017    LEONRAD (acute kidney injury) ([place] [address] ) 12/19/2017     Past Medical History:   Diagnosis Date    Altered mental status     Anticoagulated     Asthma     At[facility] flutter [facility])     CAD (coronary artery disease)     Candidiasis     Carotid stenosis, bilateral     Cataract     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) ([facility])     Chronic fatigue syndrome     Chronic low back pain     Concussion w loss of consciousness of unsp duration, init     Coronary artery disease     Diabetes mellitus ([place] [address] )     DJD (degenerative joint disease)     GERD (gastroesophageal reflux disease)     Herpes zoster     HLD (hyperlipidemia)     HTN (hypertension)     Hyperlipidemia     Hypothyroidism     Irritable bowel syndrome     Lumbar radiculopathy     Lyme disease     Lyme disease     MI (myocardial infarction)     Migraines     Muscle spasm     Non-neoplastic nevus     Nontoxic multinodular goiter     OA (osteoarthritis)     Osteoarthritis     Other chronic pain     PAD (peripheral artery disease) ([facility])     Palpitations     Pseudogout     Spinal stenosis     Transient cerebral ischemia     Trigger ring finger     Viral gastroenteritis      Admitting Diagnosis: Respiratory distress [R06 00]  SOB (shortness of breath) [R06 02]  Acute cardiogenic pulmonary edema (HCC) [I50 1]  Atrial fibrillation with rapid ventricular response (HCC) [I48 91]  Critical aortic valve stenosis [I35 0]  Fluid overload [E87 70]    Age/Sex: 76 y o  female    Assessment/Plan:   1  Acute hypoxic respiratory failure secondary to acute pulmonary edema  · Wean BiPAP as tolerated  · Wean FiO2 for SpO2 >88%  · Cardiac management as listed below  · Admit to MICU, critically ill, anticipate greater than 2 midnight stay     2  Acute valvular and diastolic CHF - in setting of severe AS undergoing TAVR evaluation  · Continue BiPAP support  · Improved HR goal <110bpm  · Repeat TTE pending  · Diuresis as tolerated - repeat lasix 40mg x 1  · Avoid any afterload reduction     3  Atrial fibrillation with RVR  · Continue metoprolol BB  · Goal HR <110  · Holding warfarin given planned TAVR evaluation     4  Prolonged Qtc  · Monitor, hold Qtc prolonging meds  · Repeat EKG in AM  · Replete K     5  Type II NSTEMI secondary #1,2 with underlying CAD  · Trend Trop  · Monitor Telemetry  · Statin/ASA/BB     6  Coumadin coagulopathy - hold further warfarin     7  DM2 - start ISS while NPO     8  Asthma w/o AE - continue flovent, asmanex as outpatient, prn nebs  9   ICU CARE  · INR >2 0, SCDs  · Full Code    Admission Orders:  Scheduled Meds:   Current Facility-Administered Medications:  acetaminophen 650 mg Oral Q6H PRN Opal Jean MD   albuterol 2 5 mg Nebulization Q4H PRN Curtis Sam DO   aspirin 81 mg Oral Daily Opal Jean MD   atorvastatin 80 mg Oral Daily With Lois Washington MD   benzonatate 100 mg Oral TID PRN Opal Jean MD   calcium carbonate 500 mg Oral PRN Opal Jean MD   chlorhexidine 15 mL Swish & Spit Q12H [address] Opal Jean MD   cholecalciferol 1,000 Units Oral Daily Opal Jean MD   fluticasone 1 puff Inhalation Q12H [address] Opal Jean MD   furosemide 40 mg Oral BID (diuretic) Cirilo Peng MD   insulin glargine 15 Units Subcutaneous Q12H [address] Devyn Velvet Seip, DO   insulin lispro 2-12 Units Subcutaneous Q6H [address] RAMO Luna   levothyroxine 100 mcg Oral Early Morning Opal Jean MD   loratadine 10 mg Oral Daily Deann Heaton MD   magnesium oxide 400 mg Oral BID Deann Heaton MD   metoprolol succinate 100 mg Oral HS Deann Heaton MD   metoprolol succinate 50 mg Oral Daily With Breakfast Deann Heaton MD   pantoprazole 40 mg Oral Early Morning [place], CRNP     Continuous Infusions:    PRN Meds:   acetaminophen    albuterol    benzonatate    calcium carbonate    MICU  BIPAP  PT eval/tx   POC glucose q 6 hr  I/o q 4 hr  Fall precautions  Early mobilization   Up w/ assist  SCDs  Diet NPO w/ sips  Diet Cardiac; Cardiac Step 1; Consistent Carbohydrate Diet Level 1 (4 carb servings/60 grams CHO/meal)  Cons CT Surgery   Cons Cardiology   Cons CM   ___________________________  3/15 Cardiology Consult  Acute on chronic diastolic heart failure  -The last echocardiogram from March 2017 at [facility] revealed LV ejection fraction of 55%, moderate to severe LVH , elevated filling pressures with E/e' was 23 77  - she has been maintained on Lasix 40 mg p  o  daily and she notes compliance with low-salt diet and medications  Acute worsening of heart failure likely precipitated by severe aortic stenosis  - she has severe pulmonary edema on x-ray, proBNP is 12 681, hypoxic and dyspneic requiring BiPAP  -received 80 mg IV Lasix, agree with continue 40 mg IV b i d  [place] Organ -would repeat an echocardiogram to evaluate LV function  - monitor close Is and Os, daily weights     Severe aortic stenosis  CHER by VTI was 0 4 cm2, aortic valve mean gradient was 27 3 mmHg in March 2017   - she is severely symptomatic with shortness of breath on minimal exertion  - seen by CT surgery outpatient, undergoing TAVR evaluation    Discussed with CT tierra PA , after medical stabilization she can undergo inpatient cardiac catheterization/ROBB that she was initially scheduled for as a part of tavr workup  - She also has moderate mitral stenosis with a mean gradient of 6 2 mmHg,      Persistent atrial fibrillation as per previous record  Presented with RVR,  She is currently in sinus rhythm after 5 mg IV metoprolol  -she also has a history of typical atrial flutter status post DC cardioversion in 2016  Would resume her home dose of metoprolol   -on warfarin, INR is 2 14      CAD  -Status post CABG, lima to LAD, sequential SVG to D1, D2, D3, sequential SVG to OM1, SVG to PDA  -Cardiac catheterization in 2016 revealed large RCA with moderate disease, mild circumflex disease, severe LAD disease  There was a patent BASSETT to LAD, a sequential SVG supplying a diagonal and 2 marginal branches  The saphenous vein graft was totally occluded after the 2nd marginal   - stable CAD with no ongoing angina   -Would continue aspirin, atorvastatin 80, metoprolol      Hypertension   Blood pressure at goal on metoprolol  Lisinopril held due to [facility]      dyslipidemia  Lipid panel in July 2017 with total cholesterol of 147, LDL 63, HDL 42, triglyceride 210  On atorvastatin 80     Peripheral vascular disease  Bilateral carotid artery stenosis, 20-49% bilaterally  __________________________  3/16 Critical Care Progress Note  1  Acute hypoxic respiratory failure secondary to acute pulmonary edema  · Wean FiO2 for SpO2 >88%, IS, OOB-Chair  · Cardiac management as listed below     2  Acute valvular and systolic/diastolic CHF - in setting of severe AS and mod-severe MS undergoing TAVR evaluation  · Continue home diuretic regimen  · Repeat CXR today  · Cont home BB  · Avoid any afterload reduction  · Holding ROBB and LHC pending improved stability  · CT surgery consulted by cardiology     3  Atrial fibrillation with RVR - now NSR  · Continue BB  · Goal HR <110  · Holding warfarin given planned TAVR evaluation     4  Prolonged Qtc  · Monitor, hold Qtc prolonging meds  · Repeat EKG in AM  · Repleted K     5  Type II NSTEMI secondary #1,2 with underlying CAD  · Monitor Telemetry  · Statin/ASA/BB     6   NSVT - tele reviewed - 4 episodes 3-4bt run NSVT     7  Coumadin coagulopathy - hold further warfarin     7  New anemia with mild hemoptysis - unclear if related to pulmonary hemorrhage during acute event - clinically improving, will trend Hgb and monitor for further hemoptysis     8  DM2 - Cont ISS, monitor glycemic needs     8  Asthma w/o AE - continue flovent, asmanex as outpatient, prn nebs     9  ICU CARE  · INR >2 0, SCDs  · Full Code  · Advance diet  _________________________  3/16 CT Surgery Consult    Diagnosis Date Noted    Acute pulmonary edema ([address] ) 03/15/2018    URI, acute 03/12/2018    PAD (peripheral artery disease) ([address] ) 12/19/2017    HTN (hypertension) 12/19/2017    Aortic valve stenosis, critical 12/19/2017    CAD (coronary artery disease) 12/19/2017    DM (diabetes mellitus) type II uncontrolled, periph vascular disorder ([address] ) 12/19/2017    Atrial flutter ([address] ) 12/19/2017    Hyperlipidemia 12/19/2017    GERD (gastroesophageal reflux disease) 12/19/2017    Moderate mitral stenosis 12/19/2017    Acute combined systolic and diastolic congestive heart failure ([address] ) 07/05/2016    Arthritis of hand 12/26/2014       Plan:     Ms  peers to 70-year-old  female with acute hypoxic respiratory failure due to acute congestive heart failure from aortic stenosis  Patient is being worked up as an outpatient for a TAVR  She still needs to complete a cardiac catheterization as well as preoperative transesophageal echocardiogram prior to her 2nd surgeon visit and planning any surgical intervention  There is no acute indication for surgical intervention at this time  The patient will follow up as an outpatient in our office for her coordination of preoperative testing and 2nd surgeon visit/scheduling surgery  Patient will be contacted by heart valve coordinator Jeremy Weir once discharged from the hospital   __________________________  3/16 Medicine Progress Note     Acute hypoxic respiratory failure - secondary to pulmonary edema related to acute CHF exacerbation   Patient initially treated with BiPAP and was able to be weaned down to 2 L nasal cannula  Patient still has significant crackles on exam   -oxygen supplementation to maintain saturations above 88%  -continue respiratory protocol  -plan as outlined below for CHF exacerbation     Acute on chronic diastolic CHF - patient had previously had preserved ejection fraction of 60%  TTE on 03/15 shows EF of 35% with aortic valve area of 0 4 cm, severe mitral stenosis and severe pulmonary hypertension  pro-BNP > 12,000 on admission  Patient with pulmonary edema with active crackles on exam   -continue Lasix 40 mg p o  b i d  Per cardiology  -monitor input/output  -monitor daily weights     NSTEMI with CAD status post CABG- troponin peaked at 2 31  Likely NSTEMI type 2 secondary to acute CHF exacerbation  Cardiology will hold off on catheterization until respiratory status is more stable   -appreciate Cardiology recommendations  -continue aspirin 81 mg p o  Daily  -continue Lipitor 80 mg p o  daily     AFib with RVR - history of atrial flutter status post DC cardioversion in 2016  Telemetry strip showed a for episodes of nonsustained V-tach overnight  Patient is currently rate controlled with pulse in the 80s  -continue rate control with metoprolol 50 mg in the morning and 100 mg in the evening  -holding Coumadin since 03/14 in preparation for anticipated cath/ROBB  -recheck INR in the AM     Aortic stenosis - TTE on 03/15 shows aortic valve area of 0 4 cm  In addition TTE also showed severe mitral stenosis  Patient had been undergoing outpatient evaluation in preparation for TAVR    CT surgery was consulted and recommends outpatient follow-up once patient is stabilized and discharged  -carotid duplex is pending for the workup  -patient will need cardiac catheterization and ROBB when Cardiology determines that patient is stable enough  -follow up with CT surgery as an outpatient     Acute blood loss anemia - hemoglobin dropped from 12 2-9 6  Likely secondary to hemoptysis  -follow up on repeat hemoglobin at 3:00 p m  today  -monitor CBC closely     Prolonged QTC - EKG on 03/16 shows normal sinus rhythm with first-degree block, LBBB, QTc 519  -check EKG in the morning  -monitor on telemetry  -hold QTc prolonging medications     Asthma  -continue Flovent  -continue loratadine  -continue albuterol p r n      Type 2 diabetes mellitus - previous A1c 7 7% in December 2017  Patient's home regimen includes Lantus 30 units b i d  and metformin  While in the MICU patient's Lantus was held and was maintained on sliding scale insulin   -continue holding home metformin  -will restart Lantus at a lower dose of 15 units tonight, and titrate up as necessary  -continue sliding scale insulin  -check hemoglobin A1c     LEONARD in CKD - baseline creatinine appears to be from 0 8 to 0 9  Creatinine 1 4 on presentation now down to 0 8   -continue monitor BMP     Leukocytosis - WBC improved from 15-14  Likely secondary to stress reaction  Low suspicion for active infection at this time as patient has been afebrile   -continue to monitor CBC     Hypothyroidism - TSH on 12/19/2017 was 0 763  -continue Synthroid 100 mcg p o  daily     Peripheral vascular disease     Hypertension - currently well controlled with BP of 128/62   -continue metoprolol  -continue Lasix     Constipation - patient reports he has not had a bowel movement in over 48 hours which is not normal for her  She would like to try eating prunes with her lunch   -continue magnesium oxide b i d      GERD  -continue Protonix 40 mg daily  -continue Tums p r n       Thank you,  [facility] in the [facility] by [address] for 2017  Network Utilization Review Department  Phone: [phone]; Fax [phone]  ATTENTION: The Network Utilization Review Department is now centralized for our 7 Facilities   Make a note that we have a new phone and fax numbers for our Department  Please call with any questions or concerns to [phone] and carefully follow the prompts so that you are directed to the right person  All voicemails are confidential  Fax any determinations, approvals, denials, and requests for initial or continue stay review clinical to [phone]  Due to HIGH CALL volume, it would be easier if you could please send faxed requests to expedite your requests and in part, help us provide discharge notifications faster

## 2018-03-16 NOTE — CONSULTS
Consultation - Cardiothoracic Surgery   Nadeem Zuniga 76 y o  female MRN: [id number]  [facility]: [id number]      History of Present Illness   Physician Requesting Consult: Bharat Correa DO  Reason for Consult / Principal Problem: severe AS  HPI: Nadeem Zuniga is a 76y o  year old female who has known aortic stenosis who we have been consulted for since she is unknown preoperative TAVR patient  Patient was seen in initial consultation on February 21, 2018 in the office with Dr Asiya Uriostegui  Please refer to this notation for full patient history  Brief HPI patient current admission:  The patient presented for outpatient CTA per TAVR protocol  Patient has a known contrast allergy and had preoperative medications for this  After CTA was complete patient went into acute hypoxic respiratory failure and a medical emergency was called  Patient was transferred to the MICU and placed on BiPAP  Chest x-ray revealed acute congestive heart failure and diuresis was initiated  Patient had troponins drawn and trended with a peak troponin of 2 31  Patient has been weaned to 5 L nasal cannula this morning  Patient resting comfortably in bed  States he feels better overall  Denies major cardiac symptomatology including but not limited to chest pain, palpitations, shortness of breath, lower extremity edema, lightheadedness/dizziness, presyncopal symptoms, numbness/paresthesias in upper extremities or lower extremities bilaterally  Patient still needs to complete cardiac catheterization and transesophageal echocardiogram prior to 2nd surgeon visit and scheduling TAVR procedure      Inpatient consult to Cardiothoracic Surgery  Consult performed by: Bc Holguin  Consult ordered by: Gavin Tsang          Review of Systems    Historical Information   Past Medical History:   Diagnosis Date    Altered mental status     Anticoagulated     Coumadin for Aflutter    Asthma     Atrial flutter ([facility] [address] )     maintained on coumadin anticoag    CAD (coronary artery disease)     Candidiasis     Carotid stenosis, bilateral     Cataract     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) (HCC)     Chronic fatigue syndrome     Chronic low back pain     Concussion w loss of consciousness of unsp duration, init     Coronary artery disease     Diabetes mellitus (HCC)     type 2, insulin dependent    DJD (degenerative joint disease)     GERD (gastroesophageal reflux disease)     Herpes zoster     HLD (hyperlipidemia)     HTN (hypertension)     Hyperlipidemia     Hypothyroidism     Irritable bowel syndrome     Lumbar radiculopathy     Lyme disease     Dx in hospital 8/2011    Lyme disease     MI (myocardial infarction)     Migraines     Muscle spasm     Non-neoplastic nevus     Nontoxic multinodular goiter     OA (osteoarthritis)     Osteoarthritis     Other chronic pain     [facility]peripheral artery disease) [facility])     Palpitations     Pseudogout     Spinal stenosis     Transient cerebral ischemia     Trigger ring finger     Viral gastroenteritis      Past Surgical History:   Procedure Laterality Date    APPENDECTOMY      ARTERIOGRAM  12/19/2017    CARDIAC CATHETERIZATION      CHOLECYSTECTOMY      COLONOSCOPY      CORONARY ARTERY BYPASS GRAFT  2004    LUMBAR LAMINECTOMY      THYROIDECTOMY      TOTAL ABDOMINAL HYSTERECTOMY W/ BILATERAL SALPINGOOPHORECTOMY       History   Alcohol Use No     History   Drug Use No     History   Smoking Status    Never Smoker   Smokeless Tobacco    Never Used     Family History: as above    Meds/Allergies   all current active meds have been reviewed, current meds:   Current Facility-Administered Medications   Medication Dose Route Frequency    acetaminophen (TYLENOL) tablet 650 mg  650 mg Oral Q6H PRN    albuterol inhalation solution 2 5 mg  2 5 mg Nebulization Q4H PRN    aspirin chewable tablet 81 mg  81 mg Oral Daily    atorvastatin (LIPITOR) tablet 80 mg  80 mg Oral Daily With Dinner    benzonatate (TESSALON PERLES) capsule 100 mg  100 mg Oral TID PRN    calcium carbonate (TUMS) chewable tablet 500 mg  500 mg Oral PRN    chlorhexidine (PERIDEX) 0 12 % oral rinse 15 mL  15 mL Swish & Spit Q12H [address]    cholecalciferol (VITAMIN D3) tablet 1,000 Units  1,000 Units Oral Daily    fluticasone (FLOVENT HFA) 110 MCG/ACT inhaler 1 puff  1 puff Inhalation Q12H [address]    furosemide (LASIX) tablet 40 mg  40 mg Oral BID (diuretic)    insulin lispro (HumaLOG) 100 units/mL subcutaneous injection 2-12 Units  2-12 Units Subcutaneous Q6H [address]    levothyroxine tablet 100 mcg  100 mcg Oral Early Morning    loratadine (CLARITIN) tablet 10 mg  10 mg Oral Daily    magnesium oxide (MAG-OX) tablet 400 mg  400 mg Oral BID    metoprolol succinate (TOPROL-XL) 24 hr tablet 100 mg  100 mg Oral HS    metoprolol succinate (TOPROL-XL) 24 hr tablet 50 mg  50 mg Oral Daily With Breakfast    pantoprazole (PROTONIX) EC tablet 40 mg  40 mg Oral Early Morning    and PTA meds:   Prior to Admission Medications   Prescriptions Last Dose Informant Patient Reported? Taking?    BANOPHEN 50 MG capsule 3/15/2018 at Unknown time  Yes Yes   Sig: TAKE ONE CAPSULE 1 HOUR PRIOR TO TEST   LANTUS 100 UNIT/ML subcutaneous injection 3/14/2018 at Unknown time Self Yes Yes   Sig: Inject 30 Units under the skin IN THE MORNING AND 30 UNITS AT BEDTIME   Magnesium Oxide 400 MG CAPS 3/14/2018 at Unknown time Self Yes Yes   Sig: Take 400 mg by mouth 2 (two) times a day   Mometasone Furoate [facility] HFA) 100 MCG/ACT AERO 3/14/2018 at Unknown time  No Yes   Sig: Inhale 2 puffs once daily   albuterol (VENTOLIN HFA) 90 mcg/act inhaler More than a month at Unknown time Self Yes No   Sig: Inhale 108 mcg 2 (two) times a day as needed 2 puffs bid PRN   aspirin 81 MG tablet 3/14/2018 at Unknown time Self Yes Yes   Sig: Take 81 mg by mouth daily   atorvastatin (LIPITOR) 80 mg tablet 3/14/2018 at Unknown time Self Yes Yes   Sig: Take 80 mg by mouth daily   benzonatate (TESSALON PERLES) 100 mg capsule Past Week at Unknown time  No Yes   Sig: Take 1 capsule (100 mg total) by mouth 3 (three) times a day as needed for cough   calcium carbonate (TUMS) 500 mg chewable tablet Past Week at Unknown time Self Yes Yes   Sig: Chew as needed   cholecalciferol (VITAMIN D3) 1,000 units tablet 3/14/2018 at Unknown time Self Yes Yes   Sig: Take 1,000 mg by mouth daily   diphenhydrAMINE (BENADRYL) 50 MG tablet Past Month at Unknown time  No Yes   Simg one hour prior to test   esomeprazole (NexIUM) 20 mg capsule 3/14/2018 at Unknown time Self Yes Yes   Si mg daily   furosemide (LASIX) 40 mg tablet 3/14/2018 at Unknown time Self Yes Yes   Sig: Take 40 mg by mouth daily   guaiFENesin (MUCINEX) 600 mg 12 hr tablet Past Week at Unknown time  No Yes   Sig: Take 2 tablets (1,200 mg total) by mouth every 12 (twelve) hours   levothyroxine 100 mcg tablet 3/14/2018 at Unknown time Self Yes Yes   Sig: Take 100 mcg by mouth daily   lisinopril (ZESTRIL) 5 mg tablet 3/14/2018 at Unknown time Self Yes Yes   Sig: Take 5 mg by mouth daily at bedtime   loratadine (CLARITIN) 10 mg tablet Past Week at Unknown time  No Yes   Sig: Take 1 tablet (10 mg total) by mouth daily   metFORMIN (GLUCOPHAGE) 1000 MG tablet 3/14/2018 at Unknown time Self Yes Yes   Si,000 mg 2 (two) times a day   methylPREDNISolone (MEDROL) 32 MG tablet 3/15/2018 at Unknown time  No Yes   Simg twelve hour prior to test, 32mg two hours prior to test   metoprolol succinate (TOPROL-XL) 100 mg 24 hr tablet 3/14/2018 at Unknown time Self Yes Yes   Si mg daily at bedtime   metoprolol succinate (TOPROL-XL) 50 mg 24 hr tablet 3/14/2018 at Unknown time Self Yes Yes   Sig: Take 1 tablet by mouth daily with breakfast   nitroglycerin (NITROSTAT) 0 4 mg SL tablet More than a month at Unknown time Self Yes No   Sig: Place 0 4 mg under the tongue every 3 (three) minutes as needed   warfarin (COUMADIN) 3 mg tablet Past Week at Unknown time Self Yes Yes   Sig: Take 3 mg by mouth daily 6 mg tuesdays, 3 mg all other days      Facility-Administered Medications: None     Allergies   Allergen Reactions    Other Chest Pain     IVP-listed as "chest pain" in previous chart, patient stated this occurred "a long time ago" but does not remember exactly occurred     Cephalosporins Chest Pain    Contrast [Iodinated Diagnostic Agents] Other (See Comments)     Flash pulm edema    Doxycycline Chest Pain    Levaquin [Levofloxacin] Chest Pain    Ondansetron Chest Pain     Prolonged QT    Toradol [Ketorolac Tromethamine] Chest Pain       Objective   Vitals: Blood pressure 112/63, pulse 78, temperature 98 1 °F (36 7 °C), temperature source Oral, resp  rate 22, height 5' 4" (1 626 m), weight 76 kg (167 lb 8 8 oz), SpO2 91 %, not currently breastfeeding  Invasive Devices     Peripheral Intravenous Line            Peripheral IV 03/15/18 Right Antecubital 1 day    Peripheral IV 03/15/18 Left Hand less than 1 day                Physical Exam   Constitutional: She is oriented to person, place, and time  Vital signs are normal  She appears well-developed and well-nourished  Non-toxic appearance  She does not appear ill  No distress  Laying in bed in NAD   HENT:   Head: Normocephalic and atraumatic  Mouth/Throat: Uvula is midline, oropharynx is clear and moist and mucous membranes are normal    Neck: Trachea normal and normal range of motion  Neck supple  No JVD present  No thyroid mass present  Cardiovascular: Normal rate, regular rhythm, S1 normal and S2 normal   PMI is not displaced  Exam reveals no gallop, no S3, no S4 and no friction rub  Murmur heard  Systolic murmur is present with a grade of 4/6   No heaves/lifts on palpation   Pulmonary/Chest: Effort normal and breath sounds normal  No accessory muscle usage  No respiratory distress  She has no wheezes   She has no rhonchi  She has no rales  Good inspiratory effort, equal expansion bilaterally   Abdominal: Normal appearance and bowel sounds are normal  She exhibits no distension and no mass  There is no tenderness  There is no rigidity, no rebound and no guarding  Obese abdomen   Neurological: She is alert and oriented to person, place, and time  No cranial nerve deficit or sensory deficit  No focal deficits   Skin: Skin is warm, dry and intact  Trace LE edema b/l   Psychiatric: She has a normal mood and affect         Lab Results:   Results from last 7 days  Lab Units 03/16/18  0506 03/15/18  1121 03/15/18  0840   WBC Thousand/uL 14 72* 15 45*  --    HEMOGLOBIN g/dL 9 6* 12 2  --    I STAT HEMOGLOBIN g/dl  --   --  11 6   HEMATOCRIT % 30 7* 40 1  --    PLATELETS Thousands/uL 328 472*  --        Results from last 7 days  Lab Units 03/16/18  0506 03/15/18  1121   SODIUM mmol/L 141 134*   POTASSIUM mmol/L 4 5 3 3*   CHLORIDE mmol/L 105 99*   CO2 mmol/L 28 19*   BUN mg/dL 30* 28*   CREATININE mg/dL 0 83 1 40*   GLUCOSE RANDOM mg/dL 122 389*   CALCIUM mg/dL 7 0* 7 4*       Results from last 7 days  Lab Units 03/16/18  0747 03/15/18  1331   INR  2 06* 2 14*     Lab Results   Component Value Date    HGBA1C 7 7 (H) 12/19/2017     Lab Results   Component Value Date    CKTOTAL 83 06/09/2017    TROPONINI 2 02 (H) 03/16/2018       Imaging Studies: I have personally reviewed pertinent films in PACS  EKG, Pathology, and Other Studies: I have personally reviewed pertinent films in PACS    CXR 3/15/18: severe CHD    TTE 3/15: EF 35-40%, mod LVH, RV mildly dilated, LA mod to markedly dilated, RA mod dilated, marked MAC, mod to severe MS, trileaflet AV w/ severe AS (CHER 0 4cm2, maurisio 45), mild AI, mild TR, severe pulm HTN    EKG 3/16: NSR w/ sinus arrhythmia, rate 73, 1st degree AV block, LAD, LBBB    Assessment:  Patient Active Problem List    Diagnosis Date Noted    Acute pulmonary edema ([address] ) 03/15/2018    URI, acute 03/12/2018    [facility] 12/19/2017    HTN (hypertension) 12/19/2017    Aortic valve stenosis, critical 12/19/2017    CAD (coronary artery disease) 12/19/2017    DM (diabetes mellitus) type II uncontrolled, periph vascular disorder ([facility] ) 12/19/2017    Atrial flutter ([facility] ) 12/19/2017    Hyperlipidemia 12/19/2017    GERD (gastroesophageal reflux disease) 12/19/2017    Moderate mitral stenosis 12/19/2017    Acute combined systolic and diastolic congestive heart failure ([facility] ) 07/05/2016    Arthritis of hand 12/26/2014       Plan:    Ms  peers to 25-year-old  female with acute hypoxic respiratory failure due to acute congestive heart failure from aortic stenosis  Patient is being worked up as an outpatient for a TAVR  She still needs to complete a cardiac catheterization as well as preoperative transesophageal echocardiogram prior to her 2nd surgeon visit and planning any surgical intervention  There is no acute indication for surgical intervention at this time  The patient will follow up as an outpatient in our office for her coordination of preoperative testing and 2nd surgeon visit/scheduling surgery  Patient will be contacted by heart valve coordinator Venus Cade once discharged from the hospital     Case discussed with RUTH Saab     The patient was comfortable with our recommendations, and their questions were answered to their satisfaction  Please call with any questions  Thank you for allowing us to participate in the care of this patient         Naomie Quinones PA-C  10:12 AM  03/16/18

## 2018-03-16 NOTE — PROGRESS NOTES
Cardiology Progress Note - Cathern Hamman 76 y o  female MRN: [id number]    Unit/Bed#: MICU 05 Encounter: [id number]      Assessment:  Principal Problem:    Acute pulmonary edema (HCC)  Active Problems:    PAD (peripheral artery disease) ([facility])    HTN (hypertension)    Aortic stenosis, severe    CAD (coronary artery disease)    DM (diabetes mellitus) type II uncontrolled, periph vascular disorder ([facility])    Atrial flutter (HCC)    Hyperlipidemia    GERD (gastroesophageal reflux disease)    Moderate mitral stenosis      Plan:  Patient feels better today  She is on nasal cannula oxygen  She has no chest pain or significant dyspnea  Her creatinine is improved today 2 8 three  Her potassium is improved  The peak troponin is 2 31  Will consult Cardiac surgery  She continues on oral furosemide b i d  Her outpatient doses daily  Subjective:   Patient seen and examined  No significant events overnight   negative  Objective:     Vitals: Blood pressure 117/64, pulse 80, temperature 98 1 °F (36 7 °C), resp  rate (!) 25, height 5' 4" (1 626 m), weight 76 kg (167 lb 8 8 oz), SpO2 97 %, not currently breastfeeding , Body mass index is 28 76 kg/m² , Orthostatic Blood Pressures    Flowsheet Row Most Recent Value   Blood Pressure  117/64 filed at 03/16/2018 0600      ,      Intake/Output Summary (Last 24 hours) at 03/16/18 0653  Last data filed at 03/15/18 2301   Gross per 24 hour   Intake               50 ml   Output             1055 ml   Net            -1005 ml       No significant arrhythmias seen on telemetry review         Physical Exam:    GEN: Cathern Hamman appears well, alert and oriented x 3, pleasant and cooperative   NECK: supple, no carotid bruits, no JVD or HJR  HEART: normal rate, regular rhythm, normal S1 and reduced S2 with a mid to late peaking systolic murmur heard at the right upper sternal border  LUNGS: clear to auscultation bilaterally; no wheezes, rales, or rhonchi   ABDOMEN: normal bowel sounds, soft, no tenderness, no distention  EXTREMITIES: peripheral pulses normal; no clubbing, cyanosis, or edema  SKIN: warm and well perfused, no suspicious lesions on exposed skin    Labs & Results:    Admission on 03/15/2018   Component Date Value    Ventricular Rate 03/15/2018 123     Atrial Rate 03/15/2018 127     QRSD Interval 03/15/2018 156     QT Interval 03/15/2018 390     QTC Interval 03/15/2018 558     QRS Axis 03/15/2018 -69     T Wave Axis 03/15/2018 97     Sodium 03/15/2018 134*    Potassium 03/15/2018 3 3*    Chloride 03/15/2018 99*    CO2 03/15/2018 19*    Anion Gap 03/15/2018 16*    BUN 03/15/2018 28*    Creatinine 03/15/2018 1 40*    Glucose 03/15/2018 389*    Calcium 03/15/2018 7 4*    eGFR 03/15/2018 36     WBC 03/15/2018 15 45*    RBC 03/15/2018 5 73*    Hemoglobin 03/15/2018 12 2     Hematocrit 03/15/2018 40 1     MCV 03/15/2018 70*    MCH 03/15/2018 21 3*    MCHC 03/15/2018 30 4*    RDW 03/15/2018 17 9*    MPV 03/15/2018 10 7     Platelets 25/76/0215 472*    nRBC 03/15/2018 0     Neutrophils Relative 03/15/2018 81*    Lymphocytes Relative 03/15/2018 18     Monocytes Relative 03/15/2018 1*    Eosinophils Relative 03/15/2018 0     Basophils Relative 03/15/2018 0     Neutrophils Absolute 03/15/2018 12 39*    Lymphocytes Absolute 03/15/2018 2 79     Monocytes Absolute 03/15/2018 0 19     Eosinophils Absolute 03/15/2018 0 02     Basophils Absolute 03/15/2018 0 02     NT-proBNP 03/15/2018 87636*    Troponin I 03/15/2018 0 56*    Protime 03/15/2018 24 1*    INR 03/15/2018 2 14*    Troponin I 03/15/2018 1 61*    Troponin I 03/15/2018 1 98*    POC Glucose 03/15/2018 253*    POC Glucose 03/15/2018 220*    Troponin I 03/15/2018 2 20*    POC Glucose 03/15/2018 192*    Troponin I 03/16/2018 2 31*    WBC 03/16/2018 14 72*    RBC 03/16/2018 4 53     Hemoglobin 03/16/2018 9 6*    Hematocrit 03/16/2018 30 7*    MCV 03/16/2018 68*    MCH 03/16/2018 21 2*  MCHC 03/16/2018 31 3*    RDW 03/16/2018 17 9*    MPV 03/16/2018 10 1     Platelets 05/26/2892 328     nRBC 03/16/2018 0     Neutrophils Relative 03/16/2018 77*    Lymphocytes Relative 03/16/2018 14     Monocytes Relative 03/16/2018 9     Eosinophils Relative 03/16/2018 0     Basophils Relative 03/16/2018 0     Neutrophils Absolute 03/16/2018 11 17*    Lymphocytes Absolute 03/16/2018 2 08     Monocytes Absolute 03/16/2018 1 36*    Eosinophils Absolute 03/16/2018 0 05     Basophils Absolute 03/16/2018 0 02     Sodium 03/16/2018 141     Potassium 03/16/2018 4 5     Chloride 03/16/2018 105     CO2 03/16/2018 28     Anion Gap 03/16/2018 8     BUN 03/16/2018 30*    Creatinine 03/16/2018 0 83     Glucose 03/16/2018 122     Calcium 03/16/2018 7 0*    AST 03/16/2018 32     ALT 03/16/2018 23     Alkaline Phosphatase 03/16/2018 65     Total Protein 03/16/2018 6 7     Albumin 03/16/2018 3 1*    Total Bilirubin 03/16/2018 1 46*    eGFR 03/16/2018 67     Magnesium 03/16/2018 1 2*    Phosphorus 03/16/2018 4 5*    Troponin I 03/16/2018 2 02*    POC Glucose 03/16/2018 139        Xr Chest 1 View Portable    Result Date: 3/15/2018  Narrative: CHEST INDICATION:   Respiratory distress, critical AS  COMPARISON:  Chest x-ray from 12/19/2017 EXAM PERFORMED/VIEWS:  XR CHEST PORTABLE FINDINGS:  There has been prior sternotomy and CABG  There is stable cardiomegaly  There is severe congestive heart failure bilaterally, worse compared to the prior exam   There may be a small left pleural effusion  Osseous structures appear within normal limits for patient age  Impression: Severe congestive heart failure bilaterally, worse compared to the prior exam  Findings concur with the preliminary ER interpretation   Workstation performed: [id number]     Vas Lower Limb Arterial Duplex, Complete Bilateral    Result Date: 2/27/2018  Narrative:  [facility] REPORT CLINICAL: Indications:  PVD, Unspecified Phillip Davison  9]  Patient presents with wounds to right foot and b/l foot pain  Risk Factors: The patient has history of Hypertension, obesity, Hyperlipidemia and Diabetes  Operative History lower extremity a-gram 12/2017 CABG Right Brachial Pressure:  133/ mmHg, Left Brachial Pressure:  124/ mmHg  FINDINGS:  Segment                Rig  Left                                          PSV  PSV  Common Femoral Artery   53   63  Prox Profunda           41   33  Prox SFA                38   39  Mid SFA                 70   64  Dist SFA                67   84  Proximal Pop            23   24  Distal Pop              30   66  Dist Post Tibial        21   36  Dist  Ant  Tibial       54   82     CONCLUSION: Impression: RIGHT LOWER LIMB: Common femoral waveforms are dampened consistent with inflow aortoiliac occlusive disease  Evaluation shows diffuse atherosclerotic disease throughout the femoro-popliteal and tibio-peroneal segments without focal stenosis  Ankle/Brachial index: Non-compressible  Prior: same finding  PVR/ PPG tracings are dampened  Metatarsal pressure of 48 mmHg Great toe pressure of 27 mmHg, below the healing range  Prior: 33 mmHg LEFT LOWER LIMB: Evaluation shows diffuse atherosclerotic disease throughout the femoro-popliteal and tibio-peroneal segments Without focal stenosis  Ankle/Brachial index: 0 83, which is moderate claudication range  Prior: non-compressible  PVR/ PPG tracings are dampened  Metatarsal pressure of 52 mmHg Great toe pressure of 50 mmHg, within the healing range  Prior: 34 mmHg  Compared to previous study on 9/22/17, there is no significant change  SIGNATURE: Electronically Signed by: Florencia Payne on 2018-02-27 05:18:15 PM      EKG personally reviewed by Zahraa Mills MD      Counseling / Coordination of Care  Total floor / unit time spent today 30 minutes  Greater than 50% of total time was spent with the patient and / or family counseling and / or coordination of care

## 2018-03-16 NOTE — PLAN OF CARE
Problem: DISCHARGE PLANNING - CARE MANAGEMENT  Goal: Discharge to post-acute care or home with appropriate resources  INTERVENTIONS:  - Conduct assessment to determine patient/family and health care team treatment goals, and need for post-acute services based on payer coverage, community resources, and patient preferences, and barriers to discharge  - Address psychosocial, clinical, and financial barriers to discharge as identified in assessment in conjunction with the patient/family and health care team  - Arrange appropriate level of post-acute services according to patient's   needs and preference and payer coverage in collaboration with the physician and health care team  - Communicate with and update the patient/family, physician, and health care team regarding progress on the discharge plan  - Arrange appropriate transportation to post-acute venues  Assist pt with referrals to appropriate rehab facilities  Outcome: Progressing

## 2018-03-16 NOTE — SOCIAL WORK
CM met pt at bedside and made aware of CM role at d/c  Pt denies having a LW or POA  Primary contact is son Deepti Public  Pt reported that she lives alone in a 2 story house with 5 JUSTINO and that she has a 1st floor set up  Pt was IPTA with ADL's, drive and retired  Pt reported that she does not use any ambulatory aids  Pt has these DME's available at home: RW, cane, showerr chair, bedside commode, rollator and grab bars Pt has hx with [facility] and with [facility] with [facility]  PCP is Dr Amber Kinsey and pharmacy is [facility] in [place]  Pt denies hx with alc, drug or psych tx  Pt has transport when d/c   CM reviewed d/c planning process including the following: identifying help at home, patient preference for d/c planning needs, Discharge Lounge, [facility] Meds to Bed program, availability of treatment team to discuss questions or concerns patient and/or family may have regarding understanding medications and recognizing signs and symptoms once discharged  CM also encouraged patient to follow up with all recommended appointments after discharge  Patient advised of importance for patient and family to participate in managing patients medical well being

## 2018-03-17 LAB
ANION GAP SERPL CALCULATED.3IONS-SCNC: 5 MMOL/L (ref 4–13)
BASOPHILS # BLD AUTO: 0.05 THOUSANDS/ΜL (ref 0–0.1)
BASOPHILS NFR BLD AUTO: 1 % (ref 0–1)
BUN SERPL-MCNC: 32 MG/DL (ref 5–25)
CALCIUM SERPL-MCNC: 7.1 MG/DL (ref 8.3–10.1)
CHLORIDE SERPL-SCNC: 102 MMOL/L (ref 100–108)
CO2 SERPL-SCNC: 30 MMOL/L (ref 21–32)
CREAT SERPL-MCNC: 0.86 MG/DL (ref 0.6–1.3)
EOSINOPHIL # BLD AUTO: 0.35 THOUSAND/ΜL (ref 0–0.61)
EOSINOPHIL NFR BLD AUTO: 3 % (ref 0–6)
ERYTHROCYTE [DISTWIDTH] IN BLOOD BY AUTOMATED COUNT: 18 % (ref 11.6–15.1)
EST. AVERAGE GLUCOSE BLD GHB EST-MCNC: 169 MG/DL
GFR SERPL CREATININE-BSD FRML MDRD: 64 ML/MIN/1.73SQ M
GLUCOSE SERPL-MCNC: 139 MG/DL (ref 65–140)
GLUCOSE SERPL-MCNC: 156 MG/DL (ref 65–140)
GLUCOSE SERPL-MCNC: 207 MG/DL (ref 65–140)
GLUCOSE SERPL-MCNC: 250 MG/DL (ref 65–140)
GLUCOSE SERPL-MCNC: 256 MG/DL (ref 65–140)
HBA1C MFR BLD: 7.5 % (ref 4.2–6.3)
HCT VFR BLD AUTO: 30.9 % (ref 34.8–46.1)
HGB BLD-MCNC: 9.5 G/DL (ref 11.5–15.4)
INR PPP: 1.93 (ref 0.86–1.16)
LYMPHOCYTES # BLD AUTO: 2.28 THOUSANDS/ΜL (ref 0.6–4.47)
LYMPHOCYTES NFR BLD AUTO: 22 % (ref 14–44)
MAGNESIUM SERPL-MCNC: 2.3 MG/DL (ref 1.6–2.6)
MCH RBC QN AUTO: 20.9 PG (ref 26.8–34.3)
MCHC RBC AUTO-ENTMCNC: 30.7 G/DL (ref 31.4–37.4)
MCV RBC AUTO: 68 FL (ref 82–98)
MONOCYTES # BLD AUTO: 0.92 THOUSAND/ΜL (ref 0.17–1.22)
MONOCYTES NFR BLD AUTO: 9 % (ref 4–12)
NEUTROPHILS # BLD AUTO: 6.78 THOUSANDS/ΜL (ref 1.85–7.62)
NEUTS SEG NFR BLD AUTO: 65 % (ref 43–75)
NRBC BLD AUTO-RTO: 0 /100 WBCS
PLATELET # BLD AUTO: 310 THOUSANDS/UL (ref 149–390)
PMV BLD AUTO: 10.1 FL (ref 8.9–12.7)
POTASSIUM SERPL-SCNC: 4 MMOL/L (ref 3.5–5.3)
PROTHROMBIN TIME: 22.2 SECONDS (ref 12.1–14.4)
RBC # BLD AUTO: 4.55 MILLION/UL (ref 3.81–5.12)
SODIUM SERPL-SCNC: 137 MMOL/L (ref 136–145)
WBC # BLD AUTO: 10.4 THOUSAND/UL (ref 4.31–10.16)

## 2018-03-17 PROCEDURE — 85610 PROTHROMBIN TIME: CPT | Performed by: NURSE PRACTITIONER

## 2018-03-17 PROCEDURE — 83735 ASSAY OF MAGNESIUM: CPT | Performed by: NURSE PRACTITIONER

## 2018-03-17 PROCEDURE — 83036 HEMOGLOBIN GLYCOSYLATED A1C: CPT | Performed by: STUDENT IN AN ORGANIZED HEALTH CARE EDUCATION/TRAINING PROGRAM

## 2018-03-17 PROCEDURE — 82948 REAGENT STRIP/BLOOD GLUCOSE: CPT

## 2018-03-17 PROCEDURE — 85025 COMPLETE CBC W/AUTO DIFF WBC: CPT | Performed by: NURSE PRACTITIONER

## 2018-03-17 PROCEDURE — 80048 BASIC METABOLIC PNL TOTAL CA: CPT | Performed by: NURSE PRACTITIONER

## 2018-03-17 PROCEDURE — 99232 SBSQ HOSP IP/OBS MODERATE 35: CPT | Performed by: INTERNAL MEDICINE

## 2018-03-17 PROCEDURE — 97167 OT EVAL HIGH COMPLEX 60 MIN: CPT

## 2018-03-17 PROCEDURE — G8988 SELF CARE GOAL STATUS: HCPCS

## 2018-03-17 PROCEDURE — 94760 N-INVAS EAR/PLS OXIMETRY 1: CPT

## 2018-03-17 PROCEDURE — 93005 ELECTROCARDIOGRAM TRACING: CPT | Performed by: INTERNAL MEDICINE

## 2018-03-17 PROCEDURE — G8987 SELF CARE CURRENT STATUS: HCPCS

## 2018-03-17 RX ORDER — FUROSEMIDE 10 MG/ML
40 INJECTION INTRAMUSCULAR; INTRAVENOUS
Status: DISCONTINUED | OUTPATIENT
Start: 2018-03-17 | End: 2018-03-18

## 2018-03-17 RX ORDER — POTASSIUM CHLORIDE 750 MG/1
10 TABLET, EXTENDED RELEASE ORAL 2 TIMES DAILY
Status: DISCONTINUED | OUTPATIENT
Start: 2018-03-17 | End: 2018-03-20 | Stop reason: HOSPADM

## 2018-03-17 RX ORDER — INSULIN GLARGINE 100 [IU]/ML
30 INJECTION, SOLUTION SUBCUTANEOUS EVERY 12 HOURS SCHEDULED
Status: DISCONTINUED | OUTPATIENT
Start: 2018-03-17 | End: 2018-03-20 | Stop reason: HOSPADM

## 2018-03-17 RX ORDER — AMOXICILLIN 250 MG
1 CAPSULE ORAL
Status: DISCONTINUED | OUTPATIENT
Start: 2018-03-17 | End: 2018-03-20 | Stop reason: HOSPADM

## 2018-03-17 RX ADMIN — ATORVASTATIN CALCIUM 80 MG: 80 TABLET, FILM COATED ORAL at 17:10

## 2018-03-17 RX ADMIN — METOPROLOL SUCCINATE 50 MG: 50 TABLET, FILM COATED, EXTENDED RELEASE ORAL at 08:25

## 2018-03-17 RX ADMIN — INSULIN LISPRO 6 UNITS: 100 INJECTION, SOLUTION INTRAVENOUS; SUBCUTANEOUS at 21:11

## 2018-03-17 RX ADMIN — INSULIN LISPRO 6 UNITS: 100 INJECTION, SOLUTION INTRAVENOUS; SUBCUTANEOUS at 11:10

## 2018-03-17 RX ADMIN — ASPIRIN 81 MG 81 MG: 81 TABLET ORAL at 08:26

## 2018-03-17 RX ADMIN — FLUTICASONE PROPIONATE 1 PUFF: 110 AEROSOL, METERED RESPIRATORY (INHALATION) at 21:16

## 2018-03-17 RX ADMIN — INSULIN GLARGINE 30 UNITS: 100 INJECTION, SOLUTION SUBCUTANEOUS at 21:11

## 2018-03-17 RX ADMIN — LORATADINE 10 MG: 10 TABLET ORAL at 08:25

## 2018-03-17 RX ADMIN — FUROSEMIDE 40 MG: 10 INJECTION, SOLUTION INTRAMUSCULAR; INTRAVENOUS at 17:10

## 2018-03-17 RX ADMIN — CHLORHEXIDINE GLUCONATE 15 ML: 1.2 RINSE ORAL at 08:27

## 2018-03-17 RX ADMIN — POTASSIUM CHLORIDE 10 MEQ: 750 TABLET, EXTENDED RELEASE ORAL at 17:10

## 2018-03-17 RX ADMIN — Medication 400 MG: at 17:10

## 2018-03-17 RX ADMIN — INSULIN GLARGINE 15 UNITS: 100 INJECTION, SOLUTION SUBCUTANEOUS at 08:28

## 2018-03-17 RX ADMIN — CHLORHEXIDINE GLUCONATE 15 ML: 1.2 RINSE ORAL at 21:11

## 2018-03-17 RX ADMIN — POTASSIUM CHLORIDE 10 MEQ: 750 TABLET, EXTENDED RELEASE ORAL at 11:10

## 2018-03-17 RX ADMIN — FUROSEMIDE 40 MG: 40 TABLET ORAL at 08:26

## 2018-03-17 RX ADMIN — METOPROLOL SUCCINATE 100 MG: 100 TABLET, EXTENDED RELEASE ORAL at 21:11

## 2018-03-17 RX ADMIN — PANTOPRAZOLE SODIUM 40 MG: 40 TABLET, DELAYED RELEASE ORAL at 06:25

## 2018-03-17 RX ADMIN — VITAMIN D, TAB 1000IU (100/BT) 1000 UNITS: 25 TAB at 08:26

## 2018-03-17 RX ADMIN — LEVOTHYROXINE SODIUM 100 MCG: 100 TABLET ORAL at 06:25

## 2018-03-17 RX ADMIN — INSULIN LISPRO 4 UNITS: 100 INJECTION, SOLUTION INTRAVENOUS; SUBCUTANEOUS at 17:10

## 2018-03-17 RX ADMIN — FLUTICASONE PROPIONATE 1 PUFF: 110 AEROSOL, METERED RESPIRATORY (INHALATION) at 08:26

## 2018-03-17 RX ADMIN — Medication 400 MG: at 08:26

## 2018-03-17 NOTE — PROGRESS NOTES
IM Residency Progress Note   Unit/Bed#: [facility] 402-01 Encounter: [id number]  SOD Team C       Hemanth Ventura 76 y o  female [id number]    Hospital Stay Days: 2      Assessment/Plan:    Acute hypoxic respiratory failure   - secondary to pulmonary edema related to acute combined systolic and diastolic CHF exacerbation    -resolve  Saturating above 90% on room air     Acute on chronic diastolic CHF   - patient had previously had preserved ejection fraction of 60%  TTE on 03/15 shows EF of 35% with aortic valve area of 0 4 cm, severe mitral stenosis and severe pulmonary hypertension  -pro-BNP > 12,000 on admission  Patient with pulmonary edema with persistent active crackles on exam   -continue Lasix 40 mg IV  b i d  Per cardiology  -monitor input/output  -monitor daily weights     NSTEMI with CAD status post CABG  - troponin peaked at 2 31  Likely NSTEMI type 2 secondary to acute CHF exacerbation  Cardiology will hold off on catheterization until respiratory status is more stable   -appreciate Cardiology recommendations  -continue aspirin 81 mg p o  Daily  -continue Lipitor 80 mg p o  daily     AFib with RVR   - status post DC cardioversion in 2016    -continue telemetry monitoring    -continue rate control with metoprolol 50 mg in the morning and 100 mg in the evening  -holding Coumadin since 03/14 in preparation for anticipated cath/ROBB  -INR 1 9     Aortic stenosis   - TTE on 03/15 shows aortic valve area of 0 4 cm  In addition TTE also showed severe mitral stenosis  -patient will need cardiac catheterization and ROBB when Cardiology determines that patient is stable enough  -follow up with CT surgery as an outpatient     Acute blood loss anemia   - hemoglobin dropped from 12 2-9 6  Likely secondary to hemoptysis    -follow up on repeat hemoglobin at 3:00 p m  today  -monitor CBC closely     Prolonged QTC   - EKG on 03/16 shows normal sinus rhythm with first-degree block, LBBB, QTc 519  -check EKG in the morning  -monitor on telemetry  -hold QTc prolonging medications     Asthma  -continue Flovent  -continue loratadine  -continue albuterol p r n      Type 2 diabetes mellitus   - previous A1c 7 7% in 2017   --> 7 5  - resume home Lantus 30 units b i d  and metformin  - continue sliding scale insulin     LEONARD in CKD   - baseline creatinine appears to be from 0 8 to 0 9     - Creatinine 1 4 on presentation now down to 0 8   -continue monitor BMP     Leukocytosis   - resolved  Likely secondary to stress reaction        Hypothyroidism   - TSH on 2017 was 0 763  -continue Synthroid 100 mcg p o  daily     Peripheral vascular disease  -continue atorvastatin 80 mg daily     Hypertension  - currently well controlled   -continue metoprolol  -continue Lasix     Constipation   - not had a bowel movement in over 48 hours  She would like to try eating prunes with her lunch   -continue magnesium oxide b i d   -will at bisacodyl Senokot docusate p r n      GERD  -continue Protonix 40 mg daily  -continue Tums p r n  Disposition:   Continue IV diureses  Continue medical care  Follow-up Cardiology recommendations  Consider heparin drip, until procedures done       Subjective:   Patient seen examined bedside  She is doing well this morning, saturating above 90% room air, shortness of breath greatly improved  Having breakfast this morning  No headache lightheadedness chest pain No bowel movement for 2 days    Denies abdominal pain       Vitals: Temp (24hrs), Av 6 °F (37 °C), Min:98 3 °F (36 8 °C), Max:99 °F (37 2 °C)  Current: Temperature: 98 3 °F (36 8 °C)  Vitals:    18 0301 18 0600 18 0749 18 1046   BP: 111/66  123/69 127/60   BP Location: Left arm  Right arm Right arm   Pulse: 80  81 82   Resp:    Temp: 98 4 °F (36 9 °C)  98 5 °F (36 9 °C) 98 3 °F (36 8 °C)   TempSrc: Oral  Oral Oral   SpO2: 92%  98% 96%   Weight:  75 1 kg (165 lb 9 1 oz)     Height:        Body mass index is 28 42 kg/m²  I/O last 24 hours:   In: 1450 [P O :1350; IV Piggyback:100]  Out: 2425 [Urine:2425]      Physical Exam: /60 (BP Location: Right arm) Comment: Map 86  Pulse 82   Temp 98 3 °F (36 8 °C) (Oral)   Resp 18   Ht 5' 4" (1 626 m)   Wt 75 1 kg (165 lb 9 1 oz)   LMP  (LMP Unknown)   SpO2 96%   BMI 28 42 kg/m²     General Appearance:    Alert, cooperative, no distress, appears stated age   Head:    Normocephalic, without obvious abnormality, atraumatic   Eyes:    PERRL, conjunctiva/corneas clear, EOM's intact, fundi     benign, both eyes   Neck:   Supple,no JVD   Lungs:     crackles bilaterally, respirations unlabored    Heart:    iregular rate and rhythm, S1 and S2 normal, no murmur, rub   or gallop   Abdomen:     Soft, non-tender, bowel sounds active all four quadrants,     no masses, no organomegaly   Neurologic:   CNII-XII intact, normal strength, sensation and reflexes     throughout        Invasive Devices     Peripheral Intravenous Line            Peripheral IV 03/15/18 Left Hand 2 days    Peripheral IV 03/15/18 Right Antecubital 2 days                          Labs:   Recent Results (from the past 24 hour(s))   Hemoglobin    Collection Time: 03/16/18  3:08 PM   Result Value Ref Range    Hemoglobin 9 8 (L) 11 5 - 15 4 g/dL   Magnesium    Collection Time: 03/16/18  3:08 PM   Result Value Ref Range    Magnesium 3 3 (H) 1 6 - 2 6 mg/dL   Fingerstick Glucose (POCT)    Collection Time: 03/16/18  5:34 PM   Result Value Ref Range    POC Glucose 218 (H) 65 - 140 mg/dl   Fingerstick Glucose (POCT)    Collection Time: 03/16/18  8:59 PM   Result Value Ref Range    POC Glucose 217 (H) 65 - 140 mg/dl   CBC and differential    Collection Time: 03/17/18  4:36 AM   Result Value Ref Range    WBC 10 40 (H) 4 31 - 10 16 Thousand/uL    RBC 4 55 3 81 - 5 12 Million/uL    Hemoglobin 9 5 (L) 11 5 - 15 4 g/dL    Hematocrit 30 9 (L) 34 8 - 46 1 %    MCV 68 (L) 82 - 98 fL    MCH 20 9 (L) 26 8 - 34 3 pg    MCHC 30 7 (L) 31 4 - 37 4 g/dL    RDW 18 0 (H) 11 6 - 15 1 %    MPV 10 1 8 9 - 12 7 fL    Platelets 177 887 - 537 Thousands/uL    nRBC 0 /100 WBCs    Neutrophils Relative 65 43 - 75 %    Lymphocytes Relative 22 14 - 44 %    Monocytes Relative 9 4 - 12 %    Eosinophils Relative 3 0 - 6 %    Basophils Relative 1 0 - 1 %    Neutrophils Absolute 6 78 1 85 - 7 62 Thousands/µL    Lymphocytes Absolute 2 28 0 60 - 4 47 Thousands/µL    Monocytes Absolute 0 92 0 17 - 1 22 Thousand/µL    Eosinophils Absolute 0 35 0 00 - 0 61 Thousand/µL    Basophils Absolute 0 05 0 00 - 0 10 Thousands/µL   Basic metabolic panel    Collection Time: 03/17/18  4:36 AM   Result Value Ref Range    Sodium 137 136 - 145 mmol/L    Potassium 4 0 3 5 - 5 3 mmol/L    Chloride 102 100 - 108 mmol/L    CO2 30 21 - 32 mmol/L    Anion Gap 5 4 - 13 mmol/L    BUN 32 (H) 5 - 25 mg/dL    Creatinine 0 86 0 60 - 1 30 mg/dL    Glucose 156 (H) 65 - 140 mg/dL    Calcium 7 1 (L) 8 3 - 10 1 mg/dL    eGFR 64 ml/min/1 73sq m   Magnesium    Collection Time: 03/17/18  4:36 AM   Result Value Ref Range    Magnesium 2 3 1 6 - 2 6 mg/dL   Protime-INR    Collection Time: 03/17/18  4:36 AM   Result Value Ref Range    Protime 22 2 (H) 12 1 - 14 4 seconds    INR 1 93 (H) 0 86 - 1 16   Hemoglobin A1C w/ EAG Estimation    Collection Time: 03/17/18  4:36 AM   Result Value Ref Range    Hemoglobin A1C 7 5 (H) 4 2 - 6 3 %     mg/dl   Fingerstick Glucose (POCT)    Collection Time: 03/17/18  6:04 AM   Result Value Ref Range    POC Glucose 139 65 - 140 mg/dl   Fingerstick Glucose (POCT)    Collection Time: 03/17/18 10:45 AM   Result Value Ref Range    POC Glucose 256 (H) 65 - 140 mg/dl       Radiology Results: I have personally reviewed pertinent reports  Other Diagnostic Testing:   I have personally reviewed pertinent reports          Active Meds:   Current Facility-Administered Medications   Medication Dose Route Frequency    acetaminophen (TYLENOL) tablet 650 mg  650 mg Oral Q6H PRN    albuterol inhalation solution 2 5 mg  2 5 mg Nebulization Q4H PRN    aspirin chewable tablet 81 mg  81 mg Oral Daily    atorvastatin (LIPITOR) tablet 80 mg  80 mg Oral Daily With Dinner    benzonatate (TESSALON PERLES) capsule 100 mg  100 mg Oral TID PRN    calcium carbonate (TUMS) chewable tablet 500 mg  500 mg Oral PRN    chlorhexidine (PERIDEX) 0 12 % oral rinse 15 mL  15 mL Swish & Spit Q12H [address]    cholecalciferol (VITAMIN D3) tablet 1,000 Units  1,000 Units Oral Daily    fluticasone (FLOVENT HFA) 110 MCG/ACT inhaler 1 puff  1 puff Inhalation Q12H [address]    furosemide (LASIX) injection 40 mg  40 mg Intravenous BID (diuretic)    insulin glargine (LANTUS) subcutaneous injection 15 Units  15 Units Subcutaneous Q12H [address]    insulin lispro (HumaLOG) 100 units/mL subcutaneous injection 2-12 Units  2-12 Units Subcutaneous Q6H [address]    levothyroxine tablet 100 mcg  100 mcg Oral Early Morning    loratadine (CLARITIN) tablet 10 mg  10 mg Oral Daily    magnesium oxide (MAG-OX) tablet 400 mg  400 mg Oral BID    metoprolol succinate (TOPROL-XL) 24 hr tablet 100 mg  100 mg Oral HS    metoprolol succinate (TOPROL-XL) 24 hr tablet 50 mg  50 mg Oral Daily With Breakfast    pantoprazole (PROTONIX) EC tablet 40 mg  40 mg Oral Early Morning    potassium chloride (K-DUR,KLOR-CON) CR tablet 10 mEq  10 mEq Oral BID         VTE Pharmacologic Prophylaxis: Warfarin (Coumadin)  VTE Mechanical Prophylaxis: sequential compression device    Bora Cuenca DO

## 2018-03-17 NOTE — RESTORATIVE TECHNICIAN NOTE
Restorative Specialist Mobility Note       Activity: Ambulate in mor[place], Chair     Assistive Device: Front wheel walker

## 2018-03-17 NOTE — PROGRESS NOTES
Cardiology Progress Note - Larisa Reilly 76 y o  female MRN: [id number]    Unit/Bed#: [facility] 402-01 Encounter: [id number]      Assessment:  Principal Problem: Aortic valve stenosis, critical  Active Problems:    PAD (peripheral artery disease) ([facility])    HTN (hypertension)    CAD (coronary artery disease)    DM (diabetes mellitus) type II uncontrolled, periph vascular disorder ([facility])    Atrial flutter ([facility])    Hyperlipidemia    GERD (gastroesophageal reflux disease)    Moderate mitral stenosis    Acute combined systolic and diastolic congestive heart failure ([facility])    Acute pulmonary edema ([address] )    Asthma      Plan:  Patient is still with mild dyspnea  On exam she does have crackles at the bases bilaterally  Will change her diuretic to intravenous for 24 hours  Will reassess her BMP tomorrow  She likely will have cardiac catheterization at some point next week when stabilized in reference to her respiratory status  She will require a dye prep in reference to her dye allergy  Subjective:   Patient seen and examined  No significant events overnight   negative  Mild shortness of breath    Objective:     Vitals: Blood pressure 123/69, pulse 81, temperature 98 5 °F (36 9 °C), temperature source Oral, resp  rate 18, height 5' 4" (1 626 m), weight 75 1 kg (165 lb 9 1 oz), SpO2 98 %, not currently breastfeeding , Body mass index is 28 42 kg/m² , Orthostatic Blood Pressures    Flowsheet Row Most Recent Value   Blood Pressure  123/69 [Map 89] filed at 03/17/2018 0749   Patient Position - Orthostatic VS  Sitting filed at 03/17/2018 0749      ,      Intake/Output Summary (Last 24 hours) at 03/17/18 0919  Last data filed at 03/17/18 0717   Gross per 24 hour   Intake             1090 ml   Output             2100 ml   Net            -1010 ml       No significant arrhythmias seen on telemetry review         Physical Exam:    GEN: Larisa Reilly appears well, alert and oriented x 3, pleasant and cooperative   NECK: supple, no carotid bruits, no JVD or HJR  HEART: normal rate, regular rhythm, normal S1 and reduced S2 with a mid to late peaking systolic murmur heard at the right upper sternal border  LUNGS:  Crackles bilaterally at the bases  ABDOMEN: normal bowel sounds, soft, no tenderness, no distention  EXTREMITIES: peripheral pulses normal; no clubbing, cyanosis, or edema  SKIN: warm and well perfused, no suspicious lesions on exposed skin    Labs & Results:    Admission on 03/15/2018   Component Date Value    Ventricular Rate 03/15/2018 123     Atrial Rate 03/15/2018 127     QRSD Interval 03/15/2018 156     QT Interval 03/15/2018 390     QTC Interval 03/15/2018 558     QRS Axis 03/15/2018 -69     T Wave Axis 03/15/2018 97     Sodium 03/15/2018 134*    Potassium 03/15/2018 3 3*    Chloride 03/15/2018 99*    CO2 03/15/2018 19*    Anion Gap 03/15/2018 16*    BUN 03/15/2018 28*    Creatinine 03/15/2018 1 40*    Glucose 03/15/2018 389*    Calcium 03/15/2018 7 4*    eGFR 03/15/2018 36     WBC 03/15/2018 15 45*    RBC 03/15/2018 5 73*    Hemoglobin 03/15/2018 12 2     Hematocrit 03/15/2018 40 1     MCV 03/15/2018 70*    MCH 03/15/2018 21 3*    MCHC 03/15/2018 30 4*    RDW 03/15/2018 17 9*    MPV 03/15/2018 10 7     Platelets 13/27/9517 472*    nRBC 03/15/2018 0     Neutrophils Relative 03/15/2018 81*    Lymphocytes Relative 03/15/2018 18     Monocytes Relative 03/15/2018 1*    Eosinophils Relative 03/15/2018 0     Basophils Relative 03/15/2018 0     Neutrophils Absolute 03/15/2018 12 39*    Lymphocytes Absolute 03/15/2018 2 79     Monocytes Absolute 03/15/2018 0 19     Eosinophils Absolute 03/15/2018 0 02     Basophils Absolute 03/15/2018 0 02     NT-proBNP 03/15/2018 55414*    Troponin I 03/15/2018 0 56*    Protime 03/15/2018 24 1*    INR 03/15/2018 2 14*    Troponin I 03/15/2018 1 61*    Troponin I 03/15/2018 1 98*    POC Glucose 03/15/2018 253*    POC Glucose 03/15/2018 220*    Troponin I 03/15/2018 2 20*    POC Glucose 03/15/2018 192*    Troponin I 03/16/2018 2 31*    WBC 03/16/2018 14 72*    RBC 03/16/2018 4 53     Hemoglobin 03/16/2018 9 6*    Hematocrit 03/16/2018 30 7*    MCV 03/16/2018 68*    MCH 03/16/2018 21 2*    MCHC 03/16/2018 31 3*    RDW 03/16/2018 17 9*    MPV 03/16/2018 10 1     Platelets 88/98/9534 328     nRBC 03/16/2018 0     Neutrophils Relative 03/16/2018 77*    Lymphocytes Relative 03/16/2018 14     Monocytes Relative 03/16/2018 9     Eosinophils Relative 03/16/2018 0     Basophils Relative 03/16/2018 0     Neutrophils Absolute 03/16/2018 11 17*    Lymphocytes Absolute 03/16/2018 2 08     Monocytes Absolute 03/16/2018 1 36*    Eosinophils Absolute 03/16/2018 0 05     Basophils Absolute 03/16/2018 0 02     Sodium 03/16/2018 141     Potassium 03/16/2018 4 5     Chloride 03/16/2018 105     CO2 03/16/2018 28     Anion Gap 03/16/2018 8     BUN 03/16/2018 30*    Creatinine 03/16/2018 0 83     Glucose 03/16/2018 122     Calcium 03/16/2018 7 0*    AST 03/16/2018 32     ALT 03/16/2018 23     Alkaline Phosphatase 03/16/2018 65     Total Protein 03/16/2018 6 7     Albumin 03/16/2018 3 1*    Total Bilirubin 03/16/2018 1 46*    eGFR 03/16/2018 67     Magnesium 03/16/2018 1 2*    Phosphorus 03/16/2018 4 5*    Troponin I 03/16/2018 2 02*    POC Glucose 03/16/2018 139     Protime 03/16/2018 23 4*    INR 03/16/2018 2 06*    Ventricular Rate 03/16/2018 73     Atrial Rate 03/16/2018 73     CA Interval 03/16/2018 296     QRSD Interval 03/16/2018 146     QT Interval 03/16/2018 471     QTC Interval 03/16/2018 520     P Axis 03/16/2018 -5     QRS [place] 03/16/2018 -63     T Wave Axis 03/16/2018 131     POC Glucose 03/16/2018 131     Hemoglobin 03/16/2018 9 8*    Magnesium 03/16/2018 3 3*    POC Glucose 03/16/2018 208*    POC Glucose 03/16/2018 218*    POC Glucose 03/16/2018 217*    WBC 03/17/2018 10 40*    RBC 03/17/2018 4 55     Hemoglobin 03/17/2018 9 5*    Hematocrit 03/17/2018 30 9*    MCV 03/17/2018 68*    MCH 03/17/2018 20 9*    MCHC 03/17/2018 30 7*    RDW 03/17/2018 18 0*    MPV 03/17/2018 10 1     Platelets 42/67/6190 310     nRBC 03/17/2018 0     Neutrophils Relative 03/17/2018 65     Lymphocytes Relative 03/17/2018 22     Monocytes Relative 03/17/2018 9     Eosinophils Relative 03/17/2018 3     Basophils Relative 03/17/2018 1     Neutrophils Absolute 03/17/2018 6 78     Lymphocytes Absolute 03/17/2018 2 28     Monocytes Absolute 03/17/2018 0 92     Eosinophils Absolute 03/17/2018 0 35     Basophils Absolute 03/17/2018 0 05     Sodium 03/17/2018 137     Potassium 03/17/2018 4 0     Chloride 03/17/2018 102     CO2 03/17/2018 30     Anion Gap 03/17/2018 5     BUN 03/17/2018 32*    Creatinine 03/17/2018 0 86     Glucose 03/17/2018 156*    Calcium 03/17/2018 7 1*    eGFR 03/17/2018 64     Magnesium 03/17/2018 2 3     Protime 03/17/2018 22 2*    INR 03/17/2018 1 93*    Hemoglobin A1C 03/17/2018 7 5*    EAG 03/17/2018 169     POC Glucose 03/17/2018 139        Xr Chest 1 View Portable    Result Date: 3/15/2018  Narrative: CHEST INDICATION:   Respiratory distress, critical AS  COMPARISON:  Chest x-ray from 12/19/2017 EXAM PERFORMED/VIEWS:  XR CHEST PORTABLE FINDINGS:  There has been prior sternotomy and CABG  There is stable cardiomegaly  There is severe congestive heart failure bilaterally, worse compared to the prior exam   There may be a small left pleural effusion  Osseous structures appear within normal limits for patient age  Impression: Severe congestive heart failure bilaterally, worse compared to the prior exam  Findings concur with the preliminary ER interpretation  Workstation performed: [id number]     Vas Carotid Complete Study    Result Date: 3/16/2018  Narrative:  [facility] REPORT CLINICAL: Indications:  Yearly surveillance of carotid artery disease    Patient is asymptomatic at this time  Patient presents for a general health evaluation secondary to future open heart surgery  Patient is asymptomatic at this time  Operative History CABG lower extremity a-gram 12/2017 Risk Factors The patient has history of HTN, Diabetes (Yes), hyperlipidemia and PAD  Clinical Right Pressure:  133/ mm Hg, Left Pressure:  124/ mm Hg  FINDINGS:  Right        Impression  PSV  EDV (cm/s)  Ratio  Dist  ICA                 42          16   0 99  Mid  ICA                  58          21   1 36  Prox  ICA    1 - 49%      52          18   1 22  Dist CCA                  48          13         Mid CCA                   42          12   0 70  Prox CCA                  61          16         Ext Carotid               82           6   1 94  Prox Vert                 42          19         Subclavian                85           9          Left         Impression  PSV  EDV (cm/s)  Ratio  Dist  ICA                 61          22   0 94  Mid  ICA                  76          28   1 18  Prox  ICA    1 - 49%      61          23   0 94  Dist CCA                  46          15         Mid CCA                   65          22   0 98  Prox CCA                  66          21         Ext Carotid               89           0   1 38  Prox Vert                 45          16         Subclavian               111           0            CONCLUSION: Impression RIGHT: There is <50% stenosis noted in the internal carotid artery  Plaque is heterogenous and irregular  Vertebral artery flow is antegrade  There is no significant subclavian artery disease  LEFT: There is <50% stenosis noted in the internal carotid artery  Plaque is heterogenous and irregular  Vertebral artery flow is antegrade  There is no significant subclavian artery disease  Compared to previous study on 09/22/2017 , there is no change    Internal carotid artery stenosis determination by consensus criteria from: Santhosh Kaplan et al  Carotid Artery Stenosis: Gray-Scale and Doppler US Diagnosis - [employer] in [address] [place] [address] [address], Radiology 2003; [id number]  SIGNATURE: Electronically Signed by: Chance Reed MD, RPVI on 2018-03-16 06:38:20 PM    Vas Lower Limb Arterial Duplex, Complete Bilateral    Result Date: 2/27/2018  Narrative:  [facility] REPORT CLINICAL: Indications:  PVD, Unspecified [I73 9]  Patient presents with wounds to right foot and b/l foot pain  Risk Factors: The patient has history of Hypertension, obesity, Hyperlipidemia and Diabetes  Operative History lower extremity a-gram 12/2017 CABG Right Brachial Pressure:  133/ mmHg, Left Brachial Pressure:  124/ mmHg  FINDINGS:  Segment                Rig  Left                                          PSV  PSV  Common Femoral Artery   53   63  Prox Profunda           41   33  Prox SFA                38   39  Mid SFA                 70   64  Dist SFA                67   84  Proximal Pop            23   24  Distal Pop              30   66  Dist Post Tibial        21   36  Dist  Ant  Tibial       54   82     CONCLUSION: Impression: RIGHT LOWER LIMB: Common femoral waveforms are dampened consistent with inflow aortoiliac occlusive disease  Evaluation shows diffuse atherosclerotic disease throughout the femoro-popliteal and tibio-peroneal segments without focal stenosis  Ankle/Brachial index: Non-compressible  Prior: same finding  PVR/ PPG tracings are dampened  Metatarsal pressure of 48 mmHg Great toe pressure of 27 mmHg, below the healing range  Prior: 33 mmHg LEFT LOWER LIMB: Evaluation shows diffuse atherosclerotic disease throughout the femoro-popliteal and tibio-peroneal segments Without focal stenosis  Ankle/Brachial index: 0 83, which is moderate claudication range  Prior: non-compressible  PVR/ PPG tracings are dampened  Metatarsal pressure of 52 mmHg Great toe pressure of 50 mmHg, within the healing range   Prior: 34 mmHg  Compared to previous study on 9/22/17, there is no significant change  SIGNATURE: Electronically Signed by: Ken Orona on 2018-02-27 05:18:15 PM    Xr Chest Portable Icu    Result Date: 3/16/2018  Narrative: CHEST INDICATION:   re-evaluation of pulmonary edema, hemoptysis  COMPARISON:  3/15/2018 EXAM PERFORMED/VIEWS:  XR CHEST PORTABLE ICU FINDINGS: Heart shadow is enlarged but unchanged from prior exam  Significantly improved congestive changes  Small pleural effusions  Osseous structures appear within normal limits for patient age  Impression: Significantly improved congestive changes  Workstation performed: [id number]     Cta Chest Abdomen Pelvis W Wo Contrast Tavr    Result Date: 3/16/2018  Narrative: CT ANGIOGRAM OF THE CHEST, ABDOMEN AND PELVIS WITH AND WITHOUT IV CONTRAST INDICATION: Preoperative evaluation for TAVR COMPARISON: May 20, 2015 TECHNIQUE:  CT angiogram examination of the chest, abdomen and pelvis was performed according to TAVR protocol including cardiac gating  Contrast as well as noncontrast images were obtained  120 ml of Visipaque 320 was injected intravenously  3D reconstructions were performed an independent workstation, and are supplied for review  This examination, like all CT scans performed in the [facility], was performed utilizing techniques to minimize radiation dose exposure, including the use of iterative reconstruction and automated exposure control  FINDINGS: VASCULAR STRUCTURES:     Annulus: diameter 31 4 x 20 0 mm     area: 482 2 sq mm   Annulus to LCA: 14 4 mm   Annulus to RCA: 12 9 mm   Minimal diameter right iliofemoral segment: 5 1 mm   Minimal diameter left iliofemoral segment: 6 6 mm Heart size is enlarged  Aortic valve is moderately calcified  There is also mitral valve annular calcifications  There is no pericardial effusion  There is been prior vein grafting to the left coronary  Atretic appearing possibly occluded LIMA graft  is also seen    The ascending aorta is ectatic measuring 37 mm  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  There is no great vessel stenosis proximally  There is moderate calcification of the aortic arch and descending thoracic aorta which are normal in caliber  The abdominal aorta is tortuous but normal in caliber  There is a moderate stenosis of the proximal right common iliac artery with the lumen narrows to 5 mm with comparison measurements of 10 mm of the distal common iliac  Internal iliac arteries are patent bilaterally  There is no significant external or common femoral stenosis  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 with relatively high origin of the medial profunda branch  There is no celiac stenosis  There is a concentric stenosis of the SMA 1 cm from its origin measuring 40% in severity  No significant renal artery stenosis is seen  OTHER FINDINGS: CHEST: LUNGS:  Diffuse ground glass opacity and patchy airspace opacities upper lobe likely related to edema  PLEURA: No pleural effusion  MEDIASTINUM AND MARY:  Mild nonspecific mediastinal and bilateral hilar lymphadenopathy  CHEST WALL AND LOWER NECK: Unremarkable  ABDOMEN LIVER/BILIARY TREE:  Unremarkable  GALLBLADDER:  Absent SPLEEN:  Unremarkable  Normal size  PANCREAS:  Unremarkable  ADRENAL GLANDS:  Unremarkable  KIDNEYS/URETERS:  No solid renal mass  No hydronephrosis  Nonobstructive calculus lower pole right kidney and probable parenchymal calcification  PELVIS REPRODUCTIVE ORGANS:  Unremarkable for patient's age  URINARY BLADDER:  Unremarkable  ADDITIONAL ABDOMINAL AND PELVIC STRUCTURES: STOMACH AND BOWEL:  Unremarkable  ABDOMINOPELVIC CAVITY:  No pathologically enlarged mesenteric or retroperitoneal lymph nodes  No ascites or free intraperitoneal air  ABDOMINAL WALL/INGUINAL REGIONS: Unremarkable  OSSEOUS STRUCTURES:  No acute fracture or destructive osseous lesion  Impression: 1    TAVR measurements:   Annulus: diameter 31 4 x 20 0 mm     area: 482 2 sq mm   Annulus to LCA: 14 4 mm   Annulus to RCA: 12 9 mm   Minimal diameter right iliofemoral segment: 5 1 mm   Minimal diameter left iliofemoral segment: 6 6 mm 2   50% relatively focal proximal right common iliac stenosis with luminal narrowing to 5 mm  3   Diffuse bilateral groundglass opacities and patchy upper lobe airspace opacities likely on the basis of edema 4  Ectatic ascending aorta measuring 37 mm 5   40% nonostial SMA stenosis Workstation performed: [id number]       EKG personally reviewed by Lj Allred MD      Counseling / Coordination of Care  Total floor / unit time spent today 30 minutes  Greater than 50% of total time was spent with the patient and / or family counseling and / or coordination of care

## 2018-03-17 NOTE — PLAN OF CARE
Problem: OCCUPATIONAL THERAPY ADULT  Goal: Performs self-care activities at highest level of function for planned discharge setting  See evaluation for individualized goals  Treatment Interventions: ADL retraining, Endurance training, Functional transfer training, Patient/family training, Continued evaluation, Energy conservation, Activityengagement          See flowsheet documentation for full assessment, interventions and recommendations  Limitation: Decreased ADL status, Decreased endurance, Decreased self-care trans, Decreased high-level ADLs  Prognosis: Good  Assessment: Pt is a 75 y/o female with admission to acute care on 3/15/18 with severe respiratory distress and coughing up pink frothy sputum  Pt with critical aortic valve stenosis with active PAD, HTN, CAD, DM, atrial flutter, hyperlipidemia, GERD, CHF and acute pulmonary edema  Pt is very pleasant lady upon evaluation  She reports no pain at rest sitting up in bedside chair  Pt reports the following home set up: 2 story home with 1st floor set up, 5 [address] with rails, tub shower with grab bars, standard toilet with no grab bars  DME and AD she owns include: RW, cane, rollator, shower chair and bedside commode  Pt reports being I with ADLs, IADLs and driving PTA  She lives alone and has family, son and daughter and grandkids in the area but not available during the daytime when at work  Pt has no reported falls in the last 6 months  She enjoys gardening and other outdoor activities when the weather is nice  Pt was able to complete sit <> stand from bedside chair with Supervision  She used RW to complete functional mobility ~50 feet with supervision with x2 standing rest breaks  She c/o R ankle pain once standing to ambulate  Her family came at end of evaluation and was unable to assess further ADL status and/or bed mobility  Pt returned to bedside chair    Pt demonstrated limited strength, endurance and overall activity tolerance requiring need for AD and rest breaks, not typical and below her baseline  Pt would benefit from further OT evaluation to further assess self cares and functional transfers  At this time from OT perspective pt would benefit from STR vs home with supervision and assist from family with home health therapy services  Pt would benefit from continued acute skilled OT for further evaluation and treatment to meet the below stated goals       OT Discharge Recommendation: Short Term Rehab (STR [facility])

## 2018-03-18 ENCOUNTER — APPOINTMENT (INPATIENT)
Dept: NON INVASIVE DIAGNOSTICS | Facility: HOSPITAL | Age: 78
DRG: 280 | End: 2018-03-18
Payer: MEDICARE

## 2018-03-18 PROBLEM — I50.1 ACUTE CARDIOGENIC PULMONARY EDEMA (HCC): Status: ACTIVE | Noted: 2018-03-15

## 2018-03-18 LAB
ANION GAP SERPL CALCULATED.3IONS-SCNC: 6 MMOL/L (ref 4–13)
BUN SERPL-MCNC: 29 MG/DL (ref 5–25)
CALCIUM SERPL-MCNC: 7.4 MG/DL (ref 8.3–10.1)
CHLORIDE SERPL-SCNC: 101 MMOL/L (ref 100–108)
CO2 SERPL-SCNC: 29 MMOL/L (ref 21–32)
CREAT SERPL-MCNC: 0.89 MG/DL (ref 0.6–1.3)
GFR SERPL CREATININE-BSD FRML MDRD: 62 ML/MIN/1.73SQ M
GLUCOSE SERPL-MCNC: 144 MG/DL (ref 65–140)
GLUCOSE SERPL-MCNC: 157 MG/DL (ref 65–140)
GLUCOSE SERPL-MCNC: 305 MG/DL (ref 65–140)
GLUCOSE SERPL-MCNC: 306 MG/DL (ref 65–140)
GLUCOSE SERPL-MCNC: 371 MG/DL (ref 65–140)
POTASSIUM SERPL-SCNC: 3.8 MMOL/L (ref 3.5–5.3)
SODIUM SERPL-SCNC: 136 MMOL/L (ref 136–145)

## 2018-03-18 PROCEDURE — 99232 SBSQ HOSP IP/OBS MODERATE 35: CPT | Performed by: INTERNAL MEDICINE

## 2018-03-18 PROCEDURE — 82948 REAGENT STRIP/BLOOD GLUCOSE: CPT

## 2018-03-18 PROCEDURE — 80048 BASIC METABOLIC PNL TOTAL CA: CPT | Performed by: INTERNAL MEDICINE

## 2018-03-18 PROCEDURE — 93926 LOWER EXTREMITY STUDY: CPT

## 2018-03-18 PROCEDURE — 94760 N-INVAS EAR/PLS OXIMETRY 1: CPT

## 2018-03-18 RX ORDER — PREDNISONE 50 MG/1
50 TABLET ORAL EVERY 6 HOURS
Status: DISCONTINUED | OUTPATIENT
Start: 2018-03-18 | End: 2018-03-18 | Stop reason: SDUPTHER

## 2018-03-18 RX ORDER — FUROSEMIDE 10 MG/ML
40 INJECTION INTRAMUSCULAR; INTRAVENOUS DAILY
Status: DISCONTINUED | OUTPATIENT
Start: 2018-03-19 | End: 2018-03-20

## 2018-03-18 RX ADMIN — CHLORHEXIDINE GLUCONATE 15 ML: 1.2 RINSE ORAL at 21:23

## 2018-03-18 RX ADMIN — INSULIN GLARGINE 30 UNITS: 100 INJECTION, SOLUTION SUBCUTANEOUS at 08:08

## 2018-03-18 RX ADMIN — PREDNISONE 50 MG: 20 TABLET ORAL at 21:22

## 2018-03-18 RX ADMIN — INSULIN LISPRO 8 UNITS: 100 INJECTION, SOLUTION INTRAVENOUS; SUBCUTANEOUS at 21:23

## 2018-03-18 RX ADMIN — METOPROLOL SUCCINATE 50 MG: 50 TABLET, FILM COATED, EXTENDED RELEASE ORAL at 08:07

## 2018-03-18 RX ADMIN — ASPIRIN 81 MG 81 MG: 81 TABLET ORAL at 08:07

## 2018-03-18 RX ADMIN — PREDNISONE 50 MG: 20 TABLET ORAL at 10:27

## 2018-03-18 RX ADMIN — POTASSIUM CHLORIDE 10 MEQ: 750 TABLET, EXTENDED RELEASE ORAL at 08:07

## 2018-03-18 RX ADMIN — ATORVASTATIN CALCIUM 80 MG: 80 TABLET, FILM COATED ORAL at 16:32

## 2018-03-18 RX ADMIN — INSULIN LISPRO 8 UNITS: 100 INJECTION, SOLUTION INTRAVENOUS; SUBCUTANEOUS at 12:35

## 2018-03-18 RX ADMIN — Medication 400 MG: at 17:10

## 2018-03-18 RX ADMIN — METOPROLOL SUCCINATE 100 MG: 100 TABLET, EXTENDED RELEASE ORAL at 21:23

## 2018-03-18 RX ADMIN — LORATADINE 10 MG: 10 TABLET ORAL at 08:07

## 2018-03-18 RX ADMIN — FUROSEMIDE 40 MG: 10 INJECTION, SOLUTION INTRAMUSCULAR; INTRAVENOUS at 08:08

## 2018-03-18 RX ADMIN — PANTOPRAZOLE SODIUM 40 MG: 40 TABLET, DELAYED RELEASE ORAL at 05:07

## 2018-03-18 RX ADMIN — LEVOTHYROXINE SODIUM 100 MCG: 100 TABLET ORAL at 05:07

## 2018-03-18 RX ADMIN — INSULIN LISPRO 10 UNITS: 100 INJECTION, SOLUTION INTRAVENOUS; SUBCUTANEOUS at 16:32

## 2018-03-18 RX ADMIN — INSULIN GLARGINE 30 UNITS: 100 INJECTION, SOLUTION SUBCUTANEOUS at 21:30

## 2018-03-18 RX ADMIN — FLUTICASONE PROPIONATE 1 PUFF: 110 AEROSOL, METERED RESPIRATORY (INHALATION) at 21:34

## 2018-03-18 RX ADMIN — POTASSIUM CHLORIDE 10 MEQ: 750 TABLET, EXTENDED RELEASE ORAL at 17:10

## 2018-03-18 RX ADMIN — FLUTICASONE PROPIONATE 1 PUFF: 110 AEROSOL, METERED RESPIRATORY (INHALATION) at 08:08

## 2018-03-18 RX ADMIN — PREDNISONE 50 MG: 20 TABLET ORAL at 16:32

## 2018-03-18 RX ADMIN — Medication 400 MG: at 08:07

## 2018-03-18 RX ADMIN — CHLORHEXIDINE GLUCONATE 15 ML: 1.2 RINSE ORAL at 08:08

## 2018-03-18 RX ADMIN — VITAMIN D, TAB 1000IU (100/BT) 1000 UNITS: 25 TAB at 08:07

## 2018-03-18 NOTE — PROGRESS NOTES
IM Residency Progress Note   Unit/Bed#: [facility] 402-01 Encounter: [id number]  SOD Team C       Magdiel Cruz 76 y o  female [id number]    Hospital Stay Days: 3      Assessment/Plan:    Principal Problem: Aortic valve stenosis, critical  Active Problems:    PAD (peripheral artery disease) ([facility])    HTN (hypertension)    CAD (coronary artery disease)    DM (diabetes mellitus) type II uncontrolled, periph vascular disorder (HCC)    Atrial flutter (HCC)    Hyperlipidemia    GERD (gastroesophageal reflux disease)    Moderate mitral stenosis    Acute combined systolic and diastolic congestive heart failure (HCC)    Acute pulmonary edema (HCC)    Asthma    1  Acute hypoxic respiratory failure  -seen in the setting of significant pulmonary edema related to acute combined systolic and diastolic heart failure exacerbation  -currently resolved  -patient satting well on room air at this time  -will continue to monitor along with Cardiology for diuresis as well as overall plan regarding further workup with cardiac catheterization/ROBB    2  Acute on chronic combined systolic and diastolic heart failure  -TTE on 03/15/2018 shows EF 35-40% with aortic valve area 0 4 centimeter squared severe mitral stenosis and severe pulmonary hypertension  -patient with persistent crackles on exam  -continue diuresis per Cardiology with Lasix 40 mg IV b i d   -patient with 2 4 L urine out over past 24 hours  -will continue to monitor at this time with medications including Lasix, metoprolol succinate, atorvastatin, aspirin    3  NSTEMI with CAD status post CABG  -troponin peak at 2 31  -patient will need cardiac catheterization will follow up cardiology's plan/recommendations  -will follow up planned for ROBB as well  -continue medications as listed above      4   Atrial fibrillation with rapid ventricular response  -status post DC cardioversion in 2016  -continue telemetry monitoring  -goal potassium greater than 4, magnesium greater than 2 0   -continue rate-controlling medications with metoprolol 100 mg at bedtime and 50 mg with breakfast  -patient did have Coumadin on hold since 03/14/2018 in preparation for anticipated catheterization/ROBB  -will follow up INR this morning and if necessary consider restarting Coumadin versus initiation of heparin infusion versus therapeutic Lovenox however will discuss with Cardiology for their recommendations  5   Severe aortic stenosis  -TTE on 03/15 showing aortic valve area 0 4 centimeters squared  -patient initially being evaluated by cardiac surgery for TAVR however will need further workup prior to procedure including cardiac catheterization and ROBB  -once Cardiology determines patient is stable enough for this will undergo catheterization/ROBB  -patient will need to follow up with CT surgery as an outpatient per their note    6  Acute blood loss anemia  -hemoglobin stable around 9 5  -will continue monitor and trend at this time   -no active signs of bleeding  7   Prolonged QTC  -EKG from 03/16 shows normal sinus rhythm with first-degree block, left bundle-branch block,   -will continue to monitor at this time and avoid QTC prolonging medications  -electrolyte goal for potassium 4 0, magnesium 2 0     8   Asthma  -continue Flovent, continue loratadine, continue albuterol as needed  -will continue to monitor    9  Type 2 diabetes mellitus  -hemoglobin A1c 7 5%  -continue Lantus 30 units twice daily as well as sliding scale insulin with meals and at bedtime  -will have home metformin on hold however continue to monitor clinically  10   LEONARD on CKD  -creatinine appears near baseline of 0 8-0 9  -will continue to monitor at this time  11   Hypothyroidism  -continue home levothyroxine continue to monitor    12    Peripheral vascular disease  -continue aspirin statin therapy  -will follow up arterial duplex of right lower extremity  -right lower extremity appears warm on exam however diminished pulses peripherally   -if arterial duplex shows significant stenosis will consider vascular surgery consultation at that time  13   Hypertension  -continue medications as listed above    14  GERD  -continue pantoprazole as listed          Disposition:  Will follow up cardiology's plan for cardiac catheterization and ROBB, will also follow up with case management as Occupational therapy recommended short-term rehabilitation for patient  Subjective:   Patient seen in exam   Per nursing, there were no acute events overnight  Per patient she notes feeling well today without shortness of breath, chest pain, or palpitations  Vitals: Temp (24hrs), Av 3 °F (36 8 °C), Min:98 °F (36 7 °C), Max:98 7 °F (37 1 °C)  Current: Temperature: 98 4 °F (36 9 °C)  Vitals:    18 1930 18 1957 18 2302 18 0310   BP: 112/70  121/72 123/71   BP Location: Left arm  Left arm Left arm   Pulse: 86  88 77   Resp: 18  18 18   Temp: 98 7 °F (37 1 °C)  98 °F (36 7 °C) 98 4 °F (36 9 °C)   TempSrc: Oral  Oral Oral   SpO2: 97% 95% 94% 95%   Weight:       Height:        Body mass index is 28 42 kg/m²  I/O last 24 hours:   In: 12 [P O :950]  Out: 3200 [Urine:3200]      Physical Exam: /71 (BP Location: Left arm) Comment: Map 92  Pulse 77   Temp 98 4 °F (36 9 °C) (Oral)   Resp 18   Ht 5' 4" (1 626 m)   Wt 75 1 kg (165 lb 9 1 oz)   LMP  (LMP Unknown)   SpO2 95%   BMI 28 42 kg/m²   General appearance: Awake, alert, no acute distress  Head: Normocephalic, without obvious abnormality, atraumatic  Eyes: EOMI, sclera white  Lungs: Decreased breath sounds bilaterally with slight crackles in bilateral lung bases, no wheezing appreciated  Abdomen: Round, nontender to palpation, active bowel sounds present  Extremities: Warm, no lower extremity edema present, Faint palpable pulses in right lower extremity     Invasive Devices     Peripheral Intravenous Line            Peripheral IV 03/15/18 Left Hand 2 days Peripheral IV 03/15/18 Right Antecubital 2 days                          Labs:   Recent Results (from the past 24 hour(s))   Fingerstick Glucose (POCT)    Collection Time: 03/17/18 10:45 AM   Result Value Ref Range    POC Glucose 256 (H) 65 - 140 mg/dl   Fingerstick Glucose (POCT)    Collection Time: 03/17/18  3:37 PM   Result Value Ref Range    POC Glucose 207 (H) 65 - 140 mg/dl   Fingerstick Glucose (POCT)    Collection Time: 03/17/18  9:05 PM   Result Value Ref Range    POC Glucose 250 (H) 65 - 140 mg/dl   Basic metabolic panel    Collection Time: 03/18/18  4:52 AM   Result Value Ref Range    Sodium 136 136 - 145 mmol/L    Potassium 3 8 3 5 - 5 3 mmol/L    Chloride 101 100 - 108 mmol/L    CO2 29 21 - 32 mmol/L    Anion Gap 6 4 - 13 mmol/L    BUN 29 (H) 5 - 25 mg/dL    Creatinine 0 89 0 60 - 1 30 mg/dL    Glucose 157 (H) 65 - 140 mg/dL    Calcium 7 4 (L) 8 3 - 10 1 mg/dL    eGFR 62 ml/min/1 73sq m   Fingerstick Glucose (POCT)    Collection Time: 03/18/18  6:06 AM   Result Value Ref Range    POC Glucose 144 (H) 65 - 140 mg/dl       Radiology Results: I have personally reviewed pertinent reports  Other Diagnostic Testing:   I have personally reviewed pertinent reports          Active Meds:   Current Facility-Administered Medications   Medication Dose Route Frequency    acetaminophen (TYLENOL) tablet 650 mg  650 mg Oral Q6H PRN    albuterol inhalation solution 2 5 mg  2 5 mg Nebulization Q4H PRN    aspirin chewable tablet 81 mg  81 mg Oral Daily    atorvastatin (LIPITOR) tablet 80 mg  80 mg Oral Daily With Dinner    benzonatate (TESSALON PERLES) capsule 100 mg  100 mg Oral TID PRN    bisacodyl (DULCOLAX) EC tablet 5 mg  5 mg Oral Daily PRN    calcium carbonate (TUMS) chewable tablet 500 mg  500 mg Oral PRN    chlorhexidine (PERIDEX) 0 12 % oral rinse 15 mL  15 mL Swish & Spit Q12H [address]    cholecalciferol (VITAMIN D3) tablet 1,000 Units  1,000 Units Oral Daily    fluticasone (FLOVENT HFA) 110 MCG/ACT inhaler 1 puff  1 puff Inhalation Q12H [address]    furosemide (LASIX) injection 40 mg  40 mg Intravenous BID (diuretic)    insulin glargine (LANTUS) subcutaneous injection 30 Units  30 Units Subcutaneous Q12H [address]    insulin lispro (HumaLOG) 100 units/mL subcutaneous injection 2-12 Units  2-12 Units Subcutaneous TID AC    insulin lispro (HumaLOG) 100 units/mL subcutaneous injection 2-12 Units  2-12 Units Subcutaneous HS    levothyroxine tablet 100 mcg  100 mcg Oral Early Morning    loratadine (CLARITIN) tablet 10 mg  10 mg Oral Daily    magnesium oxide (MAG-OX) tablet 400 mg  400 mg Oral BID    metoprolol succinate (TOPROL-XL) 24 hr tablet 100 mg  100 mg Oral HS    metoprolol succinate (TOPROL-XL) 24 hr tablet 50 mg  50 mg Oral Daily With Breakfast    pantoprazole (PROTONIX) EC tablet 40 mg  40 mg Oral Early Morning    potassium chloride (K-DUR,KLOR-CON) CR tablet 10 mEq  10 mEq Oral BID    senna-docusate sodium (SENOKOT S) 8 6-50 mg per tablet 1 tablet  1 tablet Oral HS PRN         VTE Pharmacologic Prophylaxis: Sequential compression device (Venodyne)   VTE Mechanical Prophylaxis: sequential compression device    Portia Puckett DO

## 2018-03-18 NOTE — PROGRESS NOTES
Cardiology Progress Note - Corinna Rodriguez 76 y o  female MRN: [id number]    Unit/Bed#: [facility] 402-01 Encounter: [id number]      Assessment:  Principal Problem: Aortic valve stenosis, critical  Active Problems:    PAD (peripheral artery disease) (Formerly [facility])    HTN (hypertension)    CAD (coronary artery disease)    DM (diabetes mellitus) type II uncontrolled, periph vascular disorder (Formerly [facility])    Atrial flutter (Formerly [facility])    Hyperlipidemia    GERD (gastroesophageal reflux disease)    Moderate mitral stenosis    Acute combined systolic and diastolic congestive heart failure (Formerly [facility])    Acute pulmonary edema ([address] )    Asthma      Plan:  Patient is comfortable this morning  She is off nasal cannula oxygen  She has had nice diuresis with intravenous furosemide  I will cut her back to once a day  I do think she is ready for cardiac catheterization which I will arrange for tomorrow  She will get a dye prep in reference to prior dye allergy  I will check a BMP in the morning  Eventually she will have a TAVR for severe aortic valve stenosis  Subjective:   Patient seen and examined  No significant events overnight   negative  Objective:     Vitals: Blood pressure 128/74, pulse 85, temperature 98 2 °F (36 8 °C), temperature source Oral, resp  rate 18, height 5' 4" (1 626 m), weight 74 6 kg (164 lb 7 4 oz), SpO2 96 %, not currently breastfeeding , Body mass index is 28 23 kg/m² , Orthostatic Blood Pressures    Flowsheet Row Most Recent Value   Blood Pressure  128/74 filed at 03/18/2018 0713   Patient Position - Orthostatic VS  Lying filed at 03/18/2018 0713      ,      Intake/Output Summary (Last 24 hours) at 03/18/18 0929  Last data filed at 03/18/18 0541   Gross per 24 hour   Intake              410 ml   Output             2400 ml   Net            -1990 ml       No significant arrhythmias seen on telemetry review         Physical Exam:    GEN: Corinna Rodriguez appears well, alert and oriented x 3, pleasant and cooperative   NECK: supple, no carotid bruits, no JVD or HJR  HEART: normal rate, regular rhythm, normal S1 and reduced S2 with a mid to late peaking systolic murmur heard at the right upper sternal border  LUNGS:  Crackles present at the right base    ABDOMEN: normal bowel sounds, soft, no tenderness, no distention  EXTREMITIES: peripheral pulses normal; no clubbing, cyanosis, or edema  SKIN: warm and well perfused, no suspicious lesions on exposed skin    Labs & Results:    Admission on 03/15/2018   Component Date Value    Ventricular Rate 03/15/2018 123     Atrial Rate 03/15/2018 127     QRSD Interval 03/15/2018 156     QT Interval 03/15/2018 390     QTC Interval 03/15/2018 558     QRS Axis 03/15/2018 -69     T Wave Axis 03/15/2018 97     Sodium 03/15/2018 134*    Potassium 03/15/2018 3 3*    Chloride 03/15/2018 99*    CO2 03/15/2018 19*    Anion Gap 03/15/2018 16*    BUN 03/15/2018 28*    Creatinine 03/15/2018 1 40*    Glucose 03/15/2018 389*    Calcium 03/15/2018 7 4*    eGFR 03/15/2018 36     WBC 03/15/2018 15 45*    RBC 03/15/2018 5 73*    Hemoglobin 03/15/2018 12 2     Hematocrit 03/15/2018 40 1     MCV 03/15/2018 70*    MCH 03/15/2018 21 3*    MCHC 03/15/2018 30 4*    RDW 03/15/2018 17 9*    MPV 03/15/2018 10 7     Platelets 50/40/1439 472*    nRBC 03/15/2018 0     Neutrophils Relative 03/15/2018 81*    Lymphocytes Relative 03/15/2018 18     Monocytes Relative 03/15/2018 1*    Eosinophils Relative 03/15/2018 0     Basophils Relative 03/15/2018 0     Neutrophils Absolute 03/15/2018 12 39*    Lymphocytes Absolute 03/15/2018 2 79     Monocytes Absolute 03/15/2018 0 19     Eosinophils Absolute 03/15/2018 0 02     Basophils Absolute 03/15/2018 0 02     NT-proBNP 03/15/2018 37814*    Troponin I 03/15/2018 0 56*    Protime 03/15/2018 24 1*    INR 03/15/2018 2 14*    Troponin I 03/15/2018 1 61*    Troponin I 03/15/2018 1 98*    POC Glucose 03/15/2018 253*    POC Glucose 03/15/2018 220*    Troponin I 03/15/2018 2 20*    POC Glucose 03/15/2018 192*    Troponin I 03/16/2018 2 31*    WBC 03/16/2018 14 72*    RBC 03/16/2018 4 53     Hemoglobin 03/16/2018 9 6*    Hematocrit 03/16/2018 30 7*    MCV 03/16/2018 68*    MCH 03/16/2018 21 2*    MCHC 03/16/2018 31 3*    RDW 03/16/2018 17 9*    MPV 03/16/2018 10 1     Platelets 28/58/1318 328     nRBC 03/16/2018 0     Neutrophils Relative 03/16/2018 77*    Lymphocytes Relative 03/16/2018 14     Monocytes Relative 03/16/2018 9     Eosinophils Relative 03/16/2018 0     Basophils Relative 03/16/2018 0     Neutrophils Absolute 03/16/2018 11 17*    Lymphocytes Absolute 03/16/2018 2 08     Monocytes Absolute 03/16/2018 1 36*    Eosinophils Absolute 03/16/2018 0 05     Basophils Absolute 03/16/2018 0 02     Sodium 03/16/2018 141     Potassium 03/16/2018 4 5     Chloride 03/16/2018 105     CO2 03/16/2018 28     Anion Gap 03/16/2018 8     BUN 03/16/2018 30*    Creatinine 03/16/2018 0 83     Glucose 03/16/2018 122     Calcium 03/16/2018 7 0*    AST 03/16/2018 32     ALT 03/16/2018 23     Alkaline Phosphatase 03/16/2018 65     Total Protein 03/16/2018 6 7     Albumin 03/16/2018 3 1*    Total Bilirubin 03/16/2018 1 46*    eGFR 03/16/2018 67     Magnesium 03/16/2018 1 2*    Phosphorus 03/16/2018 4 5*    Troponin I 03/16/2018 2 02*    POC Glucose 03/16/2018 139     Protime 03/16/2018 23 4*    INR 03/16/2018 2 06*    Ventricular Rate 03/16/2018 73     Atrial Rate 03/16/2018 73     LA Interval 03/16/2018 296     QRSD Interval 03/16/2018 146     QT Interval 03/16/2018 471     QTC Interval 03/16/2018 520     P Axis 03/16/2018 -5     QRS [place] 03/16/2018 -63     T Wave Axis 03/16/2018 131     POC Glucose 03/16/2018 131     Hemoglobin 03/16/2018 9 8*    Magnesium 03/16/2018 3 3*    POC Glucose 03/16/2018 208*    POC Glucose 03/16/2018 218*    POC Glucose 03/16/2018 217*    WBC 03/17/2018 10 40*    RBC 03/17/2018 4 55     Hemoglobin 03/17/2018 9 5*    Hematocrit 03/17/2018 30 9*    MCV 03/17/2018 68*    MCH 03/17/2018 20 9*    MCHC 03/17/2018 30 7*    RDW 03/17/2018 18 0*    MPV 03/17/2018 10 1     Platelets 41/42/4084 310     nRBC 03/17/2018 0     Neutrophils Relative 03/17/2018 65     Lymphocytes Relative 03/17/2018 22     Monocytes Relative 03/17/2018 9     Eosinophils Relative 03/17/2018 3     Basophils Relative 03/17/2018 1     Neutrophils Absolute 03/17/2018 6 78     Lymphocytes Absolute 03/17/2018 2 28     Monocytes Absolute 03/17/2018 0 92     Eosinophils Absolute 03/17/2018 0 35     Basophils Absolute 03/17/2018 0 05     Sodium 03/17/2018 137     Potassium 03/17/2018 4 0     Chloride 03/17/2018 102     CO2 03/17/2018 30     Anion Gap 03/17/2018 5     BUN 03/17/2018 32*    Creatinine 03/17/2018 0 86     Glucose 03/17/2018 156*    Calcium 03/17/2018 7 1*    eGFR 03/17/2018 64     Magnesium 03/17/2018 2 3     Protime 03/17/2018 22 2*    INR 03/17/2018 1 93*    Hemoglobin A1C 03/17/2018 7 5*    EAG 03/17/2018 169     POC Glucose 03/17/2018 139     POC Glucose 03/17/2018 256*    POC Glucose 03/17/2018 207*    POC Glucose 03/17/2018 250*    Sodium 03/18/2018 136     Potassium 03/18/2018 3 8     Chloride 03/18/2018 101     CO2 03/18/2018 29     Anion Gap 03/18/2018 6     BUN 03/18/2018 29*    Creatinine 03/18/2018 0 89     Glucose 03/18/2018 157*    Calcium 03/18/2018 7 4*    eGFR 03/18/2018 62     POC Glucose 03/18/2018 144*       Xr Chest 1 View Portable    Result Date: 3/15/2018  Narrative: CHEST INDICATION:   Respiratory distress, critical AS  COMPARISON:  Chest x-ray from 12/19/2017 EXAM PERFORMED/VIEWS:  XR CHEST PORTABLE FINDINGS:  There has been prior sternotomy and CABG  There is stable cardiomegaly  There is severe congestive heart failure bilaterally, worse compared to the prior exam   There may be a small left pleural effusion   Osseous structures appear within normal limits for patient age  Impression: Severe congestive heart failure bilaterally, worse compared to the prior exam  Findings concur with the preliminary ER interpretation  Workstation performed: [id number]     Vas Carotid Complete Study    Result Date: 3/16/2018  Narrative:  [facility] REPORT CLINICAL: Indications:  Yearly surveillance of carotid artery disease  Patient is asymptomatic at this time  Patient presents for a general health evaluation secondary to future open heart surgery  Patient is asymptomatic at this time  Operative History CABG lower extremity a-gram 12/2017 Risk Factors The patient has history of HTN, Diabetes (Yes), hyperlipidemia and PAD  Clinical Right Pressure:  133/ mm Hg, Left Pressure:  124/ mm Hg  FINDINGS:  Right        Impression  PSV  EDV (cm/s)  Ratio  Dist  ICA                 42          16   0 99  Mid  ICA                  58          21   1 36  Prox  ICA    1 - 49%      52          18   1 22  Dist CCA                  48          13         Mid CCA                   42          12   0 70  Prox CCA                  61          16         Ext Carotid               82           6   1 94  Prox Vert                 42          19         Subclavian                85           9          Left         Impression  PSV  EDV (cm/s)  Ratio  Dist  ICA                 61          22   0 94  Mid  ICA                  76          28   1 18  Prox  ICA    1 - 49%      61          23   0 94  Dist CCA                  46          15         Mid CCA                   65          22   0 98  Prox CCA                  66          21         Ext Carotid               89           0   1 38  Prox Vert                 45          16         Subclavian               111           0            CONCLUSION: Impression RIGHT: There is <50% stenosis noted in the internal carotid artery  Plaque is heterogenous and irregular  Vertebral artery flow is antegrade   There is no significant subclavian artery disease  LEFT: There is <50% stenosis noted in the internal carotid artery  Plaque is heterogenous and irregular  Vertebral artery flow is antegrade  There is no significant subclavian artery disease  Compared to previous study on 09/22/2017 , there is no change  Internal carotid artery stenosis determination by consensus criteria from: dallas Bales al  Carotid Artery Stenosis: Gray-Scale and Doppler US Diagnosis - [employer] of Rad[employer] in [facility] [address], Radiology 2003; [id number]  SIGNATURE: Electronically Signed by: Germaine Still MD, RPVI on 2018-03-16 06:38:20 PM    Vas Lower Limb Arterial Duplex, Complete Bilateral    Result Date: 2/27/2018  Narrative:  [facility] REPORT CLINICAL: Indications:  PVD, Unspecified [I73 9]  Patient presents with wounds to right foot and b/l foot pain  Risk Factors: The patient has history of Hypertension, obesity, Hyperlipidemia and Diabetes  Operative History lower extremity a-gram 12/2017 CABG Right Brachial Pressure:  133/ mmHg, Left Brachial Pressure:  124/ mmHg  FINDINGS:  Segment                Rig  Left                                          PSV  PSV  Common Femoral Artery   53   63  Prox Profunda           41   33  Prox SFA                38   39  Mid SFA                 70   64  Dist SFA                67   84  Proximal Pop            23   24  Distal Pop              30   66  Dist Post Tibial        21   36  Dist  Ant  Tibial       54   82     CONCLUSION: Impression: RIGHT LOWER LIMB: Common femoral waveforms are dampened consistent with inflow aortoiliac occlusive disease  Evaluation shows diffuse atherosclerotic disease throughout the femoro-popliteal and tibio-peroneal segments without focal stenosis  Ankle/Brachial index: Non-compressible  Prior: same finding  PVR/ PPG tracings are dampened  Metatarsal pressure of 48 mmHg Great toe pressure of 27 mmHg, below the healing range   Prior: 33 mmHg LEFT LOWER LIMB: Evaluation shows diffuse atherosclerotic disease throughout the femoro-popliteal and tibio-peroneal segments Without focal stenosis  Ankle/Brachial index: 0 83, which is moderate claudication range  Prior: non-compressible  PVR/ PPG tracings are dampened  Metatarsal pressure of 52 mmHg Great toe pressure of 50 mmHg, within the healing range  Prior: 34 mmHg  Compared to previous study on 9/22/17, there is no significant change  SIGNATURE: Electronically Signed by: Carlos Eduardo Mckeon on 2018-02-27 05:18:15 PM    Xr Chest Portable Icu    Result Date: 3/16/2018  Narrative: CHEST INDICATION:   re-evaluation of pulmonary edema, hemoptysis  COMPARISON:  3/15/2018 EXAM PERFORMED/VIEWS:  XR CHEST PORTABLE ICU FINDINGS: Heart shadow is enlarged but unchanged from prior exam  Significantly improved congestive changes  Small pleural effusions  Osseous structures appear within normal limits for patient age  Impression: Significantly improved congestive changes  Workstation performed: [id number]     Cta Chest Abdomen Pelvis W Wo Contrast Tavr    Result Date: 3/16/2018  Narrative: CT ANGIOGRAM OF THE CHEST, ABDOMEN AND PELVIS WITH AND WITHOUT IV CONTRAST INDICATION: Preoperative evaluation for TAVR COMPARISON: May 20, 2015 TECHNIQUE:  CT angiogram examination of the chest, abdomen and pelvis was performed according to TAVR protocol including cardiac gating  Contrast as well as noncontrast images were obtained  120 ml of Visipaque 320 was injected intravenously  3D reconstructions were performed an independent workstation, and are supplied for review  This examination, like all CT scans performed in the [facility], was performed utilizing techniques to minimize radiation dose exposure, including the use of iterative reconstruction and automated exposure control   FINDINGS: VASCULAR STRUCTURES:     Annulus: diameter 31 4 x 20 0 mm     area: 482 2 sq mm   Annulus to LCA: 14 4 mm   Annulus to RCA: 12 9 mm   Minimal diameter right iliofemoral segment: 5 1 mm   Minimal diameter left iliofemoral segment: 6 6 mm Heart size is enlarged  Aortic valve is moderately calcified  There is also mitral valve annular calcifications  There is no pericardial effusion  There is been prior vein grafting to the left coronary  Atretic appearing possibly occluded LIMA graft  is also seen  The ascending aorta is ectatic measuring 37 mm  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  There is no great vessel stenosis proximally  There is moderate calcification of the aortic arch and descending thoracic aorta which are normal in caliber  The abdominal aorta is tortuous but normal in caliber  There is a moderate stenosis of the proximal right common iliac artery with the lumen narrows to 5 mm with comparison measurements of 10 mm of the distal common iliac  Internal iliac arteries are patent bilaterally  There is no significant external or common femoral stenosis  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 with relatively high origin of the medial profunda branch  There is no celiac stenosis  There is a concentric stenosis of the SMA 1 cm from its origin measuring 40% in severity  No significant renal artery stenosis is seen  OTHER FINDINGS: CHEST: LUNGS:  Diffuse ground glass opacity and patchy airspace opacities upper lobe likely related to edema  PLEURA: No pleural effusion  MEDIASTINUM AND MARY:  Mild nonspecific mediastinal and bilateral hilar lymphadenopathy  CHEST WALL AND LOWER NECK: Unremarkable  ABDOMEN LIVER/BILIARY TREE:  Unremarkable  GALLBLADDER:  Absent SPLEEN:  Unremarkable  Normal size  PANCREAS:  Unremarkable  ADRENAL GLANDS:  Unremarkable  KIDNEYS/URETERS:  No solid renal mass  No hydronephrosis  Nonobstructive calculus lower pole right kidney and probable parenchymal calcification  PELVIS REPRODUCTIVE ORGANS:  Unremarkable for patient's age  URINARY BLADDER:  Unremarkable   ADDITIONAL ABDOMINAL AND PELVIC STRUCTURES: STOMACH AND BOWEL:  Unremarkable  ABDOMINOPELVIC CAVITY:  No pathologically enlarged mesenteric or retroperitoneal lymph nodes  No ascites or free intraperitoneal air  ABDOMINAL WALL/INGUINAL REGIONS: Unremarkable  OSSEOUS STRUCTURES:  No acute fracture or destructive osseous lesion  Impression: 1  TAVR measurements:   Annulus: diameter 31 4 x 20 0 mm     area: 482 2 sq mm   Annulus to LCA: 14 4 mm   Annulus to RCA: 12 9 mm   Minimal diameter right iliofemoral segment: 5 1 mm   Minimal diameter left iliofemoral segment: 6 6 mm 2   50% relatively focal proximal right common iliac stenosis with luminal narrowing to 5 mm  3   Diffuse bilateral groundglass opacities and patchy upper lobe airspace opacities likely on the basis of edema 4  Ectatic ascending aorta measuring 37 mm 5   40% nonostial SMA stenosis Workstation performed: [id number]       EKG personally reviewed by Clem Arauz MD      Counseling / Coordination of Care  Total floor / unit time spent today 30 minutes  Greater than 50% of total time was spent with the patient and / or family counseling and / or coordination of care

## 2018-03-19 ENCOUNTER — APPOINTMENT (INPATIENT)
Dept: NON INVASIVE DIAGNOSTICS | Facility: HOSPITAL | Age: 78
DRG: 280 | End: 2018-03-19
Attending: INTERNAL MEDICINE
Payer: MEDICARE

## 2018-03-19 ENCOUNTER — APPOINTMENT (INPATIENT)
Dept: NON INVASIVE DIAGNOSTICS | Facility: HOSPITAL | Age: 78
DRG: 280 | End: 2018-03-19
Payer: MEDICARE

## 2018-03-19 LAB
ANION GAP SERPL CALCULATED.3IONS-SCNC: 10 MMOL/L (ref 4–13)
ATRIAL RATE: 81 BPM
BUN SERPL-MCNC: 31 MG/DL (ref 5–25)
CALCIUM SERPL-MCNC: 8.1 MG/DL (ref 8.3–10.1)
CHLORIDE SERPL-SCNC: 102 MMOL/L (ref 100–108)
CO2 SERPL-SCNC: 25 MMOL/L (ref 21–32)
CREAT SERPL-MCNC: 0.93 MG/DL (ref 0.6–1.3)
ERYTHROCYTE [DISTWIDTH] IN BLOOD BY AUTOMATED COUNT: 17.6 % (ref 11.6–15.1)
GFR SERPL CREATININE-BSD FRML MDRD: 59 ML/MIN/1.73SQ M
GLUCOSE SERPL-MCNC: 264 MG/DL (ref 65–140)
GLUCOSE SERPL-MCNC: 305 MG/DL (ref 65–140)
GLUCOSE SERPL-MCNC: 362 MG/DL (ref 65–140)
GLUCOSE SERPL-MCNC: 374 MG/DL (ref 65–140)
GLUCOSE SERPL-MCNC: 407 MG/DL (ref 65–140)
HCT VFR BLD AUTO: 34.4 % (ref 34.8–46.1)
HGB BLD-MCNC: 10.3 G/DL (ref 11.5–15.4)
INR PPP: 1.27 (ref 0.86–1.16)
MCH RBC QN AUTO: 20.6 PG (ref 26.8–34.3)
MCHC RBC AUTO-ENTMCNC: 29.9 G/DL (ref 31.4–37.4)
MCV RBC AUTO: 69 FL (ref 82–98)
P AXIS: -14 DEGREES
PLATELET # BLD AUTO: 372 THOUSANDS/UL (ref 149–390)
PMV BLD AUTO: 10.8 FL (ref 8.9–12.7)
POTASSIUM SERPL-SCNC: 4.2 MMOL/L (ref 3.5–5.3)
PR INTERVAL: 294 MS
PROTHROMBIN TIME: 16 SECONDS (ref 12.1–14.4)
QRS AXIS: -50 DEGREES
QRSD INTERVAL: 150 MS
QT INTERVAL: 450 MS
QTC INTERVAL: 522 MS
RBC # BLD AUTO: 5.01 MILLION/UL (ref 3.81–5.12)
SODIUM SERPL-SCNC: 137 MMOL/L (ref 136–145)
T WAVE AXIS: 128 DEGREES
VENTRICULAR RATE: 81 BPM
WBC # BLD AUTO: 11.67 THOUSAND/UL (ref 4.31–10.16)

## 2018-03-19 PROCEDURE — 99233 SBSQ HOSP IP/OBS HIGH 50: CPT | Performed by: INTERNAL MEDICINE

## 2018-03-19 PROCEDURE — C1769 GUIDE WIRE: HCPCS | Performed by: INTERNAL MEDICINE

## 2018-03-19 PROCEDURE — 93010 ELECTROCARDIOGRAM REPORT: CPT | Performed by: INTERNAL MEDICINE

## 2018-03-19 PROCEDURE — G8978 MOBILITY CURRENT STATUS: HCPCS

## 2018-03-19 PROCEDURE — 85027 COMPLETE CBC AUTOMATED: CPT | Performed by: INTERNAL MEDICINE

## 2018-03-19 PROCEDURE — 99152 MOD SED SAME PHYS/QHP 5/>YRS: CPT | Performed by: INTERNAL MEDICINE

## 2018-03-19 PROCEDURE — 93312 ECHO TRANSESOPHAGEAL: CPT

## 2018-03-19 PROCEDURE — 82948 REAGENT STRIP/BLOOD GLUCOSE: CPT

## 2018-03-19 PROCEDURE — B2131ZZ FLUOROSCOPY OF MULTIPLE CORONARY ARTERY BYPASS GRAFTS USING LOW OSMOLAR CONTRAST: ICD-10-PCS | Performed by: INTERNAL MEDICINE

## 2018-03-19 PROCEDURE — 85610 PROTHROMBIN TIME: CPT | Performed by: INTERNAL MEDICINE

## 2018-03-19 PROCEDURE — 93455 CORONARY ART/GRFT ANGIO S&I: CPT | Performed by: INTERNAL MEDICINE

## 2018-03-19 PROCEDURE — 76377 3D RENDER W/INTRP POSTPROCES: CPT

## 2018-03-19 PROCEDURE — B24BZZ4 ULTRASONOGRAPHY OF HEART WITH AORTA, TRANSESOPHAGEAL: ICD-10-PCS | Performed by: INTERNAL MEDICINE

## 2018-03-19 PROCEDURE — G8979 MOBILITY GOAL STATUS: HCPCS

## 2018-03-19 PROCEDURE — 93320 DOPPLER ECHO COMPLETE: CPT | Performed by: INTERNAL MEDICINE

## 2018-03-19 PROCEDURE — 94760 N-INVAS EAR/PLS OXIMETRY 1: CPT | Performed by: SOCIAL WORKER

## 2018-03-19 PROCEDURE — B2181ZZ FLUOROSCOPY OF LEFT INTERNAL MAMMARY BYPASS GRAFT USING LOW OSMOLAR CONTRAST: ICD-10-PCS | Performed by: INTERNAL MEDICINE

## 2018-03-19 PROCEDURE — B2111ZZ FLUOROSCOPY OF MULTIPLE CORONARY ARTERIES USING LOW OSMOLAR CONTRAST: ICD-10-PCS | Performed by: INTERNAL MEDICINE

## 2018-03-19 PROCEDURE — 93926 LOWER EXTREMITY STUDY: CPT | Performed by: SURGERY

## 2018-03-19 PROCEDURE — C1894 INTRO/SHEATH, NON-LASER: HCPCS | Performed by: INTERNAL MEDICINE

## 2018-03-19 PROCEDURE — 93325 DOPPLER ECHO COLOR FLOW MAPG: CPT | Performed by: INTERNAL MEDICINE

## 2018-03-19 PROCEDURE — 93312 ECHO TRANSESOPHAGEAL: CPT | Performed by: INTERNAL MEDICINE

## 2018-03-19 PROCEDURE — 93922 UPR/L XTREMITY ART 2 LEVELS: CPT | Performed by: SURGERY

## 2018-03-19 PROCEDURE — 99153 MOD SED SAME PHYS/QHP EA: CPT | Performed by: INTERNAL MEDICINE

## 2018-03-19 PROCEDURE — 80048 BASIC METABOLIC PNL TOTAL CA: CPT | Performed by: INTERNAL MEDICINE

## 2018-03-19 PROCEDURE — 97163 PT EVAL HIGH COMPLEX 45 MIN: CPT

## 2018-03-19 RX ORDER — LIDOCAINE HYDROCHLORIDE 10 MG/ML
INJECTION, SOLUTION INFILTRATION; PERINEURAL CODE/TRAUMA/SEDATION MEDICATION
Status: COMPLETED | OUTPATIENT
Start: 2018-03-19 | End: 2018-03-19

## 2018-03-19 RX ORDER — SODIUM CHLORIDE 9 MG/ML
INJECTION, SOLUTION INTRAVENOUS CONTINUOUS PRN
Status: DISCONTINUED | OUTPATIENT
Start: 2018-03-19 | End: 2018-03-19 | Stop reason: SURG

## 2018-03-19 RX ORDER — DIPHENHYDRAMINE HCL 25 MG
50 TABLET ORAL ONCE
Status: DISCONTINUED | OUTPATIENT
Start: 2018-03-19 | End: 2018-03-19

## 2018-03-19 RX ORDER — FENTANYL CITRATE 50 UG/ML
INJECTION, SOLUTION INTRAMUSCULAR; INTRAVENOUS AS NEEDED
Status: DISCONTINUED | OUTPATIENT
Start: 2018-03-19 | End: 2018-03-19 | Stop reason: SURG

## 2018-03-19 RX ORDER — LIDOCAINE HYDROCHLORIDE 10 MG/ML
INJECTION, SOLUTION INFILTRATION; PERINEURAL AS NEEDED
Status: DISCONTINUED | OUTPATIENT
Start: 2018-03-19 | End: 2018-03-19 | Stop reason: SURG

## 2018-03-19 RX ORDER — FENTANYL CITRATE 50 UG/ML
INJECTION, SOLUTION INTRAMUSCULAR; INTRAVENOUS CODE/TRAUMA/SEDATION MEDICATION
Status: COMPLETED | OUTPATIENT
Start: 2018-03-19 | End: 2018-03-19

## 2018-03-19 RX ORDER — ALBUTEROL SULFATE 90 UG/1
2 AEROSOL, METERED RESPIRATORY (INHALATION) EVERY 4 HOURS PRN
Status: DISCONTINUED | OUTPATIENT
Start: 2018-03-19 | End: 2018-03-20 | Stop reason: HOSPADM

## 2018-03-19 RX ORDER — PROPOFOL 10 MG/ML
INJECTION, EMULSION INTRAVENOUS AS NEEDED
Status: DISCONTINUED | OUTPATIENT
Start: 2018-03-19 | End: 2018-03-19 | Stop reason: SURG

## 2018-03-19 RX ORDER — WARFARIN SODIUM 1 MG/1
3 TABLET ORAL
Status: DISCONTINUED | OUTPATIENT
Start: 2018-03-19 | End: 2018-03-20 | Stop reason: HOSPADM

## 2018-03-19 RX ORDER — PROPOFOL 10 MG/ML
INJECTION, EMULSION INTRAVENOUS CONTINUOUS PRN
Status: DISCONTINUED | OUTPATIENT
Start: 2018-03-19 | End: 2018-03-19 | Stop reason: SURG

## 2018-03-19 RX ORDER — SODIUM CHLORIDE 9 MG/ML
100 INJECTION, SOLUTION INTRAVENOUS CONTINUOUS
Status: DISCONTINUED | OUTPATIENT
Start: 2018-03-19 | End: 2018-03-19

## 2018-03-19 RX ORDER — DIPHENHYDRAMINE HYDROCHLORIDE 50 MG/ML
INJECTION INTRAMUSCULAR; INTRAVENOUS CODE/TRAUMA/SEDATION MEDICATION
Status: COMPLETED | OUTPATIENT
Start: 2018-03-19 | End: 2018-03-19

## 2018-03-19 RX ORDER — DIPHENHYDRAMINE HYDROCHLORIDE 50 MG/ML
50 INJECTION INTRAMUSCULAR; INTRAVENOUS ONCE
Status: DISCONTINUED | OUTPATIENT
Start: 2018-03-19 | End: 2018-03-19

## 2018-03-19 RX ORDER — SODIUM CHLORIDE 9 MG/ML
100 INJECTION, SOLUTION INTRAVENOUS CONTINUOUS
Status: DISCONTINUED | OUTPATIENT
Start: 2018-03-19 | End: 2018-03-20

## 2018-03-19 RX ORDER — MIDAZOLAM HYDROCHLORIDE 1 MG/ML
INJECTION INTRAMUSCULAR; INTRAVENOUS CODE/TRAUMA/SEDATION MEDICATION
Status: COMPLETED | OUTPATIENT
Start: 2018-03-19 | End: 2018-03-19

## 2018-03-19 RX ADMIN — PROPOFOL 30 MG: 10 INJECTION, EMULSION INTRAVENOUS at 10:43

## 2018-03-19 RX ADMIN — LEVOTHYROXINE SODIUM 100 MCG: 100 TABLET ORAL at 05:18

## 2018-03-19 RX ADMIN — FENTANYL CITRATE 25 MCG: 50 INJECTION, SOLUTION INTRAMUSCULAR; INTRAVENOUS at 13:30

## 2018-03-19 RX ADMIN — WARFARIN SODIUM 3 MG: 1 TABLET ORAL at 18:41

## 2018-03-19 RX ADMIN — LIDOCAINE HYDROCHLORIDE 10 ML: 10 INJECTION, SOLUTION INFILTRATION; PERINEURAL at 12:58

## 2018-03-19 RX ADMIN — LORATADINE 10 MG: 10 TABLET ORAL at 08:41

## 2018-03-19 RX ADMIN — FENTANYL CITRATE 50 MCG: 50 INJECTION, SOLUTION INTRAMUSCULAR; INTRAVENOUS at 12:55

## 2018-03-19 RX ADMIN — FENTANYL CITRATE 50 MCG: 50 INJECTION, SOLUTION INTRAMUSCULAR; INTRAVENOUS at 13:35

## 2018-03-19 RX ADMIN — ASPIRIN 81 MG 81 MG: 81 TABLET ORAL at 08:40

## 2018-03-19 RX ADMIN — INSULIN LISPRO 6 UNITS: 100 INJECTION, SOLUTION INTRAVENOUS; SUBCUTANEOUS at 21:28

## 2018-03-19 RX ADMIN — FENTANYL CITRATE 25 MCG: 50 INJECTION, SOLUTION INTRAMUSCULAR; INTRAVENOUS at 13:00

## 2018-03-19 RX ADMIN — DIPHENHYDRAMINE HYDROCHLORIDE 50 MG: 50 INJECTION, SOLUTION INTRAMUSCULAR; INTRAVENOUS at 12:54

## 2018-03-19 RX ADMIN — INSULIN GLARGINE 30 UNITS: 100 INJECTION, SOLUTION SUBCUTANEOUS at 21:26

## 2018-03-19 RX ADMIN — SODIUM CHLORIDE: 0.9 INJECTION, SOLUTION INTRAVENOUS at 10:23

## 2018-03-19 RX ADMIN — FLUTICASONE PROPIONATE 1 PUFF: 110 AEROSOL, METERED RESPIRATORY (INHALATION) at 08:40

## 2018-03-19 RX ADMIN — CHLORHEXIDINE GLUCONATE 15 ML: 1.2 RINSE ORAL at 08:41

## 2018-03-19 RX ADMIN — POTASSIUM CHLORIDE 10 MEQ: 750 TABLET, EXTENDED RELEASE ORAL at 18:41

## 2018-03-19 RX ADMIN — MIDAZOLAM 0.5 MG: 1 INJECTION INTRAMUSCULAR; INTRAVENOUS at 13:00

## 2018-03-19 RX ADMIN — HYDROCORTISONE SODIUM SUCCINATE 100 MG: 100 INJECTION, POWDER, FOR SOLUTION INTRAMUSCULAR; INTRAVENOUS at 12:53

## 2018-03-19 RX ADMIN — Medication 400 MG: at 18:41

## 2018-03-19 RX ADMIN — MIDAZOLAM 1 MG: 1 INJECTION INTRAMUSCULAR; INTRAVENOUS at 13:35

## 2018-03-19 RX ADMIN — FENTANYL CITRATE 25 MCG: 50 INJECTION, SOLUTION INTRAMUSCULAR; INTRAVENOUS at 10:35

## 2018-03-19 RX ADMIN — CHLORHEXIDINE GLUCONATE 15 ML: 1.2 RINSE ORAL at 21:26

## 2018-03-19 RX ADMIN — TOPICAL ANESTHETIC 2 SPRAY: 200 SPRAY DENTAL; PERIODONTAL at 10:39

## 2018-03-19 RX ADMIN — METOPROLOL SUCCINATE 50 MG: 50 TABLET, FILM COATED, EXTENDED RELEASE ORAL at 08:41

## 2018-03-19 RX ADMIN — POTASSIUM CHLORIDE 10 MEQ: 750 TABLET, EXTENDED RELEASE ORAL at 08:41

## 2018-03-19 RX ADMIN — PROPOFOL 50 MCG/KG/MIN: 10 INJECTION, EMULSION INTRAVENOUS at 10:40

## 2018-03-19 RX ADMIN — FLUTICASONE PROPIONATE 1 PUFF: 110 AEROSOL, METERED RESPIRATORY (INHALATION) at 21:27

## 2018-03-19 RX ADMIN — IOHEXOL 100 ML: 350 INJECTION, SOLUTION INTRAVENOUS at 13:19

## 2018-03-19 RX ADMIN — FUROSEMIDE 40 MG: 10 INJECTION, SOLUTION INTRAMUSCULAR; INTRAVENOUS at 08:40

## 2018-03-19 RX ADMIN — PREDNISONE 50 MG: 20 TABLET ORAL at 05:18

## 2018-03-19 RX ADMIN — PANTOPRAZOLE SODIUM 40 MG: 40 TABLET, DELAYED RELEASE ORAL at 05:18

## 2018-03-19 RX ADMIN — LIDOCAINE HYDROCHLORIDE 50 MG: 10 INJECTION, SOLUTION INFILTRATION; PERINEURAL at 10:40

## 2018-03-19 RX ADMIN — MIDAZOLAM 1 MG: 1 INJECTION INTRAMUSCULAR; INTRAVENOUS at 12:55

## 2018-03-19 RX ADMIN — INSULIN LISPRO 12 UNITS: 100 INJECTION, SOLUTION INTRAVENOUS; SUBCUTANEOUS at 20:14

## 2018-03-19 RX ADMIN — INSULIN LISPRO 8 UNITS: 100 INJECTION, SOLUTION INTRAVENOUS; SUBCUTANEOUS at 06:02

## 2018-03-19 RX ADMIN — PROPOFOL 50 MG: 10 INJECTION, EMULSION INTRAVENOUS at 10:40

## 2018-03-19 RX ADMIN — INSULIN GLARGINE 15 UNITS: 100 INJECTION, SOLUTION SUBCUTANEOUS at 08:39

## 2018-03-19 RX ADMIN — FENTANYL CITRATE 25 MCG: 50 INJECTION, SOLUTION INTRAMUSCULAR; INTRAVENOUS at 10:41

## 2018-03-19 RX ADMIN — METOPROLOL SUCCINATE 100 MG: 100 TABLET, EXTENDED RELEASE ORAL at 21:27

## 2018-03-19 RX ADMIN — IOHEXOL 100 ML: 350 INJECTION, SOLUTION INTRAVENOUS at 13:53

## 2018-03-19 RX ADMIN — MIDAZOLAM 0.5 MG: 1 INJECTION INTRAMUSCULAR; INTRAVENOUS at 13:30

## 2018-03-19 RX ADMIN — ATORVASTATIN CALCIUM 80 MG: 80 TABLET, FILM COATED ORAL at 18:41

## 2018-03-19 NOTE — PLAN OF CARE
Problem: PHYSICAL THERAPY ADULT  Goal: Performs mobility at highest level of function for planned discharge setting  See evaluation for individualized goals  Treatment/Interventions: Functional transfer training, LE strengthening/ROM, Elevations, Therapeutic exercise, Endurance training, Bed mobility, Gait training, Equipment eval/education, Spoke to nursing, OT  Equipment Recommended:  (TBD)       See flowsheet documentation for full assessment, interventions and recommendations  Prognosis: Good  Problem List: Decreased strength, Decreased endurance, Impaired balance, Decreased mobility  Assessment: Pt is 76 y o  presented to the ED on 3/15 as a medical alert from CT scan, found to have acute CHF exacerbation  Dx include: Critical aortic valve stenosis, Acute hypoxic respiratory failure, Acute on chronic combined systolic and diastolic heart failure, NSTEMI, a-fib w/ RVR, acute blood loss anemia  Pt 's comorbidities affecting POC include: CAD s/p CABG, severe aortic stenosis, diastolic CHF with EF 84%, PAF on coumadin, DM on insulin, CKD, PAD, HTN, asthma, concussion, Lyme dz, spinal stenosis and personal factors of: living alone and JUSTINO  Pt's clinical presentation is currently unstable/unpredictable which is evident in ongoing telem monitoring, abn lab values being monitored/trending, pending cardiac procedure this AM  Pt presents w/ generalized weakness, incl decreased LE strength, decreased functional endurance, min impaired unsupported balance and associated gait deviations w/o AD (will introduce SPC for short distance and rw for longer distances) and fall risk  Will cont to follow pt in PT for progressive mobilization to address above functional deficits and to max level of (I), endurance, and safety   Otherwise, anticipate pt will return home w/ available family support upon D/C provided she cont improving w/ mobility skills, safety, and endurance (incl on the steps) and when medically cleared; home PT follow up is recommended; will follow  Barriers to Discharge: Inaccessible home environment, Decreased caregiver support     Recommendation: Home PT          See flowsheet documentation for full assessment

## 2018-03-19 NOTE — PHYSICAL THERAPY NOTE
Physical Therapy Evaluation     Patient's Name: Ko Mahoney    Admitting Diagnosis  Respiratory distress [R06 00]  SOB (shortness of breath) [R06 02]  Acute cardiogenic pulmonary edema (HCC) [I50 1]  Atrial fibrillation with rapid ventricular response (HCC) [I48 91]  Critical aortic valve stenosis [I35 0]  Fluid overload [E87 70]    Problem List  Patient Active Problem List   Diagnosis    PAD (peripheral artery disease) ([facility]    Essential hypertension    Critical aortic valve stenosis    CAD (coronary artery disease)    DM (diabetes mellitus) type II uncontrolled, periph vascular disorder ([facility] )    Atrial flutter (HCC)    Hyperlipidemia    Gastroesophageal reflux disease without esophagitis    Moderate mitral stenosis    Acute combined systolic and diastolic congestive heart failure (HCC)    URI, acute    Arthritis of hand    Acute cardiogenic pulmonary edema (HCC)    Asthma    [facility]       Past Medical History  Past Medical History:   Diagnosis Date    Altered mental status     Anticoagulated     Coumadin for Aflutter    Asthma     Atrial flutter ([facility] )     maintained on coumadin anticoag    CAD (coronary artery disease)     Candidiasis     Carotid stenosis, bilateral     Cataract     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) (HCC)     Chronic fatigue syndrome     Chronic low back pain     Concussion w loss of consciousness of unsp duration, init     Coronary artery disease     Diabetes mellitus (HCC)     type 2, insulin dependent    DJD (degenerative joint disease)     GERD (gastroesophageal reflux disease)     Herpes zoster     HLD (hyperlipidemia)     HTN (hypertension)     Hyperlipidemia     Hypothyroidism     Irritable bowel syndrome     Lumbar radiculopathy     Lyme disease     Dx in hospital 8/2011    Lyme disease     MI (myocardial infarction)     Migraines     Muscle spasm     Non-neoplastic nevus     Nontoxic multinodular goiter     OA (osteoarthritis)     Osteoarthritis     Other chronic pain     PAD (peripheral artery disease) (HCC)     Palpitations     Pseudogout     Spinal stenosis     Transient cerebral ischemia     Trigger ring finger     Viral gastroenteritis        Past Surgical History  Past Surgical History:   Procedure Laterality Date    APPENDECTOMY      ARTERIOGRAM  12/19/2017    CARDIAC CATHETERIZATION      CHOLECYSTECTOMY      COLONOSCOPY      CORONARY ARTERY BYPASS GRAFT  2004    LUMBAR LAMINECTOMY      THYROIDECTOMY      TOTAL ABDOMINAL HYSTERECTOMY W/ BILATERAL SALPINGOOPHORECTOMY            03/19/18 0836   Note Type   Note type Eval only   Pain Assessment   Pain Assessment No/denies pain  (reports prior (R) foot/ankle discomfort is gone)   Home Living   Type of [address] [place] [address] Two level; Able to live on main level with bedroom/bathroom  (1st floor set-up; 5 JUSTINO w/ (B) hand rail)   Home Equipment Walker;Cane  (rollator)   Prior Function   Level of [place] Independent with ADLs and functional mobility  (amb w/o AD holding on to furinture at times)   Lives With Alone   Receives Help From Family   Restrictions/Precautions   Braces or Orthoses (diabetic shoes)   Other Precautions Cardiac/sternal;Fall Risk;Telemetry   General   Additional Pertinent History cleared for assessment (spoke to nsg); cardiac cath pending   Cognition   Overall Cognitive Status [facility]   Arousal/Participation Alert   Attention Within functional limits   Orientation Level Oriented to person;Oriented to place;Oriented to situation   Memory Within functional limits   Following Commands Follows one step commands without difficulty   Comments Pt is observed reclined in the chair; responds to questions appropriately; agreeable to demonstrate mobility skills; states she has been going to the BR   RUE Assessment   RUE Assessment WFL  (AROM)   LUE Assessment   LUE Assessment WFL  (AROM)   RLE Assessment   RLE Assessment WFL  (AROM)   Strength RLE   RLE Overall Strength 4-/5   LLE Assessment   LLE Assessment WFL  (AROM)   Strength LLE   LLE Overall Strength 4-/5   Transfers   Sit to Stand 5  Supervision   Additional items Assist x 1;Verbal cues   Stand to Sit 5  Supervision   Additional items Assist x 1;Verbal cues   Ambulation/Elevation   Gait pattern Excessively slow; Short stride; Inconsistent kolton   Gait Assistance 5  Supervision   Additional items Assist x 1;Verbal cues; Tactile cues   Assistive Device None  (will introduce SPC vs rw next visit for distances  )   Distance 16 ft total distance; further amb was not performed due to pending cardiac procedure   Balance   Static Sitting Good   Static Standing Fair   Ambulatory Fair -   Activity Tolerance   Activity Tolerance Treatment limited secondary to medical complications (Comment)   Nurse Made Aware spoke to Misael Rey RN   Assessment   Prognosis Good   Problem List Decreased strength;Decreased endurance; Impaired balance;Decreased mobility   Assessment Pt is 76 y o  presented to the ED on 3/15 as a medical alert from CT scan, found to have acute CHF exacerbation  Dx include: Critical aortic valve stenosis, Acute hypoxic respiratory failure, Acute on chronic combined systolic and diastolic heart failure, NSTEMI, a-fib w/ RVR, acute blood loss anemia  Pt 's comorbidities affecting POC include: CAD s/p CABG, severe aortic stenosis, diastolic CHF with EF 48%, PAF on coumadin, DM on insulin, CKD, PAD, HTN, asthma, concussion, Lyme dz, spinal stenosis and personal factors of: living alone and JUSTINO   Pt's clinical presentation is currently unstable/unpredictable which is evident in ongoing telem monitoring, abn lab values being monitored/trending, pending cardiac procedure this AM  Pt presents w/ generalized weakness, incl decreased LE strength, decreased functional endurance, min impaired unsupported balance and associated gait deviations w/o AD (will introduce SPC for short distance and rw for longer distances) and fall risk  Will cont to follow pt in PT for progressive mobilization to address above functional deficits and to max level of (I), endurance, and safety  Otherwise, anticipate pt will return home w/ available family support upon D/C provided she cont improving w/ mobility skills, safety, and endurance (incl on the steps) and when medically cleared; home PT follow up is recommended; will follow  Barriers to Discharge Inaccessible home environment;Decreased caregiver support   Goals   Patient Goals none expressed   STG Expiration Date 03/29/18   Short Term Goal #1 7-10 days  Pt will amb 150 ft w/ least restrictive assistive device, mod (I)  Pt will negotiate 5 steps w/ hand rail and SPC, mod (I)  Pt will achieve (I) level w/ bed mob  Pt will perform transfers w/ mod (I)  Plan   Treatment/Interventions Functional transfer training;LE strengthening/ROM; Elevations; Therapeutic exercise; Endurance training;Bed mobility;Gait training;Equipment eval/education;Spoke to nursing;OT   PT Frequency 5x/wk   Recommendation   Recommendation Home PT   Equipment Recommended (TBD)   Modified [place] Scale   Modified Rae Scale 3   Barthel Index   Feeding 0  (currently NPO)   Bathing 5   Grooming Score 5   Dressing Score 5   Bladder Score 10   Bowels Score 10   Toilet Use Score 5   Transfers (Bed/Chair) Score 10   Mobility (Level Surface) Score 0   Stairs Score 0   Barthel Index Score 50         Dereje Esparza, PT

## 2018-03-19 NOTE — ANESTHESIA POSTPROCEDURE EVALUATION
Post-Op Assessment Note      CV Status:  Stable    Mental Status:  Alert and awake    Hydration Status:  Euvolemic    PONV Controlled:  Controlled    Airway Patency:  Patent    Post Op Vitals Reviewed: Yes          Staff: CRNA           BP   122/63   Temp      Pulse  80   Resp   12   SpO2   93%

## 2018-03-19 NOTE — CASE MANAGEMENT
Continued Stay Review    Date: 3/19    Vital Signs: /65 (BP Location: Left arm)   Pulse 67   Temp 97 8 °F (36 6 °C) (Oral)   Resp 18   Ht 5' 4" (1 626 m)   Wt 74 4 kg (164 lb 0 4 oz)   LMP  (LMP Unknown)   SpO2 99%   BMI 28 15 kg/m²     Medications:   Scheduled Meds:   Current Facility-Administered Medications:  acetaminophen 650 mg Oral Q6H PRN Dennys Vaz MD   albuterol 2 puff Inhalation Q4H PRN Eva Mello MD   aspirin 81 mg Oral Daily Dennys Vaz MD   atorvastatin 80 mg Oral Daily With Meche Kim MD   benzonatate 100 mg Oral TID PRN Dennys Vaz MD   bisacodyl 5 mg Oral Daily PRN Kendall Pouch, DO   calcium carbonate 500 mg Oral PRN Dennys Vaz MD   chlorhexidine 15 mL Swish & Spit Q12H Isaias Mcgill MD   cholecalciferol 1,000 Units Oral Daily Dennys Vaz MD   fluticasone 1 puff Inhalation Q12H [facility] Dennys Vaz MD   furosemide 40 mg Intravenous Daily Norbert Roland MD   insulin glargine 30 Units Subcutaneous Q12H [facility] Pouch, DO   insulin lispro 2-12 Units Subcutaneous TID [facility] Yuan Denson MD   insulin lispro 2-12 Units Subcutaneous HS Yuan Denson MD   levothyroxine 100 mcg Oral Early Morning Dennys Vaz MD   loratadine 10 mg Oral Daily Dennys Vaz MD   magnesium oxide 400 mg Oral BID Dennys Vaz MD   metoprolol succinate 100 mg Oral HS Dennys Vaz MD   metoprolol succinate 50 mg Oral Daily With Breakfast Dennys Vaz MD   pantoprazole 40 mg Oral Early Morning RAMO Plummer   potassium chloride 10 mEq Oral BID Norbert Roland MD   senna-docusate sodium 1 tablet Oral HS PRN Kendall Pouch, DO   sodium chloride 100 mL/hr Intravenous Continuous Remer Phalen, MD   warfarin 3 mg Oral Daily (warfarin) Lilia Avila MD     Continuous Infusions:   sodium chloride 100 mL/hr     PRN Meds:   acetaminophen    albuterol    benzonatate    bisacodyl    calcium carbonate    senna-docusate sodium    Abnormal Labs/Diagnostic Results:   BUN 31 Creatinine 0 93  Glucose 362  Ca 8 1  Cardiac cath   IMPRESSIONS:  There is significant triple vessel coronary artery disease  Patent LIMA-LAD, patent SVG D1-2-3/sequential(mild degeneration)  occluded(OLD) SVG om1 and SVG PDA     RECOMMENDATIONS  The patient should continue with the present medications  Consultation be obtained for aortic valve replacement      ROBB - critical aortic stenosis, moderate mitral valve stenosis, EF 35%    Age/Sex: 76 y o  female     Assessment/Plan:     Aortic stenosis   - TTE on 03/15 shows aortic valve area of 0 4 cm   In addition TTE also showed severe mitral stenosis     -ROBB 9:30 am  -Cath: 10:30 am  -follow up with CT surgery as an outpatient     History Allergy reaction to Dye  Allergy prep before procedure with : Prednisone and Benadryl     Acute hypoxic respiratory failure   -resolve  Saturating above 95% on room air  - likely secondary to pulmonary edema related to acute combined systolic and diastolic CHF exacerbation trigger after dye allergy reaction     Acute on chronic diastolic CHF   - patient had previously had preserved ejection fraction of 60%   TTE on 03/15 shows EF of 35% with aortic valve area of 0 4 cm, severe mitral stenosis and severe pulmonary hypertension  -pro-BNP > 12,000 on admission   Patient with pulmonary edema with persistent active crackles on exam   -continue Lasix 40 mg IV  per cardiology  -monitor input/output  -monitor daily weights     NSTEMI with CAD status post CABG  - troponin peaked at 2 31   Likely NSTEMI type 2 secondary to acute CHF exacerbation   Cardiology will hold off on catheterization until respiratory status is more stable   -appreciate Cardiology recommendations  -continue aspirin 81 mg p o  daily  -continue Lipitor 80 mg p o  daily     AFib with RVR   - status post DC cardioversion in 2016    -continue telemetry monitoring    -continue rate control with metoprolol 50 mg in the morning and 100 mg in the evening  -holding Coumadin since 03/14 in preparation for anticipated cath/ROBB  -INR 1 2     Acute blood loss anemia   - hemoglobin dropped from 12 2-9 6  [place] Speak secondary to hemoptysis  -follow up on repeat hemoglobin at 3:00 p m  today  -monitor CBC closely     Prolonged QTC   - EKG on 03/16 shows normal sinus rhythm with first-degree block, LBBB, ARZ 995  -check EKG in the morning  -monitor on telemetry  -hold QTc prolonging medications     Asthma  -continue Flovent  -continue loratadine  -continue albuterol p r n      Type 2 diabetes mellitus   - previous A1c 7 7% in December 2017   --> 7 5  - resume home Lantus 30 units b i d  and metformin  Today give 1/2 of morning Lantus (15 U)  - continue sliding scale insulin     LEONARD in CKD   - baseline creatinine appears to be from 0 8 to 0 9     -resolve     Leukocytosis   - resolved  Likely secondary to stress reaction        Hypothyroidism   - TSH on 12/19/2017 was 0 763  -continue Synthroid 100 mcg p o  daily     Peripheral vascular disease  -continue atorvastatin 80 mg daily      Hypertension  - currently well controlled   -continue metoprolol  -continue Lasix     Constipation   - not had a bowel movement in over 48 hours  She would like to try eating prunes with her lunch   -continue magnesium oxide b i d   -will at bisacodyl Senokot docusate p r n      GERD  -continue Protonix 40 mg daily  -continue Tums p r n         Disposition:   ROBB 9:30 am  Cath: 10:30 am  anticoagulation for AFib on hold   TBD when  to be resume  per Cardiology recommendation  STR vs home with supervision with [facility] OT       Discharge Plan:  TBD

## 2018-03-19 NOTE — PROGRESS NOTES
IM Residency Progress Note   Unit/Bed#: [facility] 402-01 Encounter: [id number]  SOD Team C       Saroj Ferraro 76 y o  female [id number]    Hospital Stay Days: 4      Assessment/Plan: Aortic stenosis   - TTE on 03/15 shows aortic valve area of 0 4 cm  In addition TTE also showed severe mitral stenosis  -ROBB 9:30 am  -Cath: 10:30 am  -follow up with CT surgery as an outpatient    History Allergy reaction to Dye  Allergy prep before procedure with : Prednisone and Benadryl    Acute hypoxic respiratory failure   -resolve  Saturating above 95% on room air  - likely secondary to pulmonary edema related to acute combined systolic and diastolic CHF exacerbation trigger after dye allergy reaction     Acute on chronic diastolic CHF   - patient had previously had preserved ejection fraction of 60%  TTE on 03/15 shows EF of 35% with aortic valve area of 0 4 cm, severe mitral stenosis and severe pulmonary hypertension  -pro-BNP > 12,000 on admission  Patient with pulmonary edema with persistent active crackles on exam   -continue Lasix 40 mg IV  per cardiology  -monitor input/output  -monitor daily weights     NSTEMI with CAD status post CABG  - troponin peaked at 2 31  Likely NSTEMI type 2 secondary to acute CHF exacerbation  Cardiology will hold off on catheterization until respiratory status is more stable   -appreciate Cardiology recommendations  -continue aspirin 81 mg p o  daily  -continue Lipitor 80 mg p o  daily     AFib with RVR   - status post DC cardioversion in 2016    -continue telemetry monitoring    -continue rate control with metoprolol 50 mg in the morning and 100 mg in the evening  -holding Coumadin since 03/14 in preparation for anticipated cath/ROBB  -INR 1 2     Acute blood loss anemia   - hemoglobin dropped from 12 2-9 6  Likely secondary to hemoptysis    -follow up on repeat hemoglobin at 3:00 p m  today  -monitor CBC closely     Prolonged QTC   - EKG on 03/16 shows normal sinus rhythm with first-degree block, LBBB, QTc 519  -check EKG in the morning  -monitor on telemetry  -hold QTc prolonging medications     Asthma  -continue Flovent  -continue loratadine  -continue albuterol p r n      Type 2 diabetes mellitus   - previous A1c 7 7% in 2017   --> 7 5  - resume home Lantus 30 units b i d  and metformin  Today give 1/2 of morning Lantus (15 U)  - continue sliding scale insulin     LEONARD in CKD   - baseline creatinine appears to be from 0 8 to 0 9     -resolve     Leukocytosis   - resolved  Likely secondary to stress reaction        Hypothyroidism   - TSH on 2017 was 0 763  -continue Synthroid 100 mcg p o  daily     Peripheral vascular disease  -continue atorvastatin 80 mg daily     Hypertension  - currently well controlled   -continue metoprolol  -continue Lasix     Constipation   - not had a bowel movement in over 48 hours  She would like to try eating prunes with her lunch   -continue magnesium oxide b i d   -will at bisacodyl Senokot docusate p r n      GERD  -continue Protonix 40 mg daily  -continue Tums p r n  Disposition:   ROBB 9:30 am  Cath: 10:30 am  anticoagulation for AFib on hold  TBD when  to be resume  per Cardiology recommendation  STR vs home with supervision with [facility] OT      Subjective:   Patient seen examined bedside  Doing well this morning, before procedure  She is NPO this time  Denies chest pain shortness of breath  Had  bowel movement early on  Vitals: Temp (24hrs), Av 1 °F (36 7 °C), Min:97 7 °F (36 5 °C), Max:98 3 °F (36 8 °C)  Current: Temperature: 97 8 °F (36 6 °C)  Vitals:    18 1909 18 2018 18 2302 18 0251   BP: 142/67  131/63 130/71   BP Location: Left arm  Left arm Right arm   Pulse: 87  85 82   Resp:    Temp: 98 2 °F (36 8 °C)  97 7 °F (36 5 °C) 97 8 °F (36 6 °C)   TempSrc: Oral  Oral Oral   SpO2: 95% 94% 95% 93%   Weight:       Height:        Body mass index is 28 23 kg/m²  I/O last 24 hours:   In: 1395 [P O :1395]  Out: 3343 [Urine:3975]      Physical Exam: /71 (BP Location: Right arm) Comment: Map 94  Pulse 82   Temp 97 8 °F (36 6 °C) (Oral)   Resp 18   Ht 5' 4" (1 626 m)   Wt 74 6 kg (164 lb 7 4 oz)   LMP  (LMP Unknown)   SpO2 93%   BMI 28 23 kg/m²     General Appearance:    Alert, cooperative, no distress, appears stated age   Head:    Normocephalic, without obvious abnormality, atraumatic   Eyes:    PERRL, conjunctiva/corneas clear, EOM's intact, fundi     benign, both eyes   Neck:   Supple,no JVD   Lungs:     crackles bilaterally, respirations unlabored    Heart:    iregular rate and rhythm, S1 and S2 normal, no murmur, rub   or gallop   Abdomen:     Soft, non-tender, bowel sounds active all four quadrants,     no masses, no organomegaly   Neurologic:   CNII-XII intact, normal strength, sensation and reflexes     throughout        Invasive Devices     Peripheral Intravenous Line            Peripheral IV 03/15/18 Left Hand 3 days    Peripheral IV 03/15/18 Right Antecubital 3 days                          Labs:   Recent Results (from the past 24 hour(s))   Fingerstick Glucose (POCT)    Collection Time: 03/18/18 10:32 AM   Result Value Ref Range    POC Glucose 306 (H) 65 - 140 mg/dl   Fingerstick Glucose (POCT)    Collection Time: 03/18/18  3:36 PM   Result Value Ref Range    POC Glucose 371 (H) 65 - 140 mg/dl   Fingerstick Glucose (POCT)    Collection Time: 03/18/18  8:55 PM   Result Value Ref Range    POC Glucose 305 (H) 65 - 140 mg/dl   Basic metabolic panel    Collection Time: 03/19/18  4:33 AM   Result Value Ref Range    Sodium 137 136 - 145 mmol/L    Potassium 4 2 3 5 - 5 3 mmol/L    Chloride 102 100 - 108 mmol/L    CO2 25 21 - 32 mmol/L    Anion Gap 10 4 - 13 mmol/L    BUN 31 (H) 5 - 25 mg/dL    Creatinine 0 93 0 60 - 1 30 mg/dL    Glucose 362 (H) 65 - 140 mg/dL    Calcium 8 1 (L) 8 3 - 10 1 mg/dL    eGFR 59 ml/min/1 73sq m       Radiology Results: I have personally reviewed pertinent reports  Other Diagnostic Testing:   I have personally reviewed pertinent reports          Active Meds:   Current Facility-Administered Medications   Medication Dose Route Frequency    acetaminophen (TYLENOL) tablet 650 mg  650 mg Oral Q6H PRN    albuterol inhalation solution 2 5 mg  2 5 mg Nebulization Q4H PRN    aspirin chewable tablet 81 mg  81 mg Oral Daily    atorvastatin (LIPITOR) tablet 80 mg  80 mg Oral Daily With Dinner    benzonatate (TESSALON PERLES) capsule 100 mg  100 mg Oral TID PRN    bisacodyl (DULCOLAX) EC tablet 5 mg  5 mg Oral Daily PRN    calcium carbonate (TUMS) chewable tablet 500 mg  500 mg Oral PRN    chlorhexidine (PERIDEX) 0 12 % oral rinse 15 mL  15 mL Swish & Spit Q12H [address]    cholecalciferol (VITAMIN D3) tablet 1,000 Units  1,000 Units Oral Daily    fluticasone (FLOVENT HFA) 110 MCG/ACT inhaler 1 puff  1 puff Inhalation Q12H LANCE    furosemide (LASIX) injection 40 mg  40 mg Intravenous Daily    insulin glargine (LANTUS) subcutaneous injection 30 Units  30 Units Subcutaneous Q12H LANCE    insulin lispro (HumaLOG) 100 units/mL subcutaneous injection 2-12 Units  2-12 Units Subcutaneous TID AC    insulin lispro (HumaLOG) 100 units/mL subcutaneous injection 2-12 Units  2-12 Units Subcutaneous HS    levothyroxine tablet 100 mcg  100 mcg Oral Early Morning    loratadine (CLARITIN) tablet 10 mg  10 mg Oral Daily    magnesium oxide (MAG-OX) tablet 400 mg  400 mg Oral BID    metoprolol succinate (TOPROL-XL) 24 hr tablet 100 mg  100 mg Oral HS    metoprolol succinate (TOPROL-XL) 24 hr tablet 50 mg  50 mg Oral Daily With Breakfast    pantoprazole (PROTONIX) EC tablet 40 mg  40 mg Oral Early Morning    potassium chloride (K-DUR,KLOR-CON) CR tablet 10 mEq  10 mEq Oral BID    predniSONE tablet 50 mg  50 mg Oral Q6H    senna-docusate sodium (SENOKOT S) 8 6-50 mg per tablet 1 tablet  1 tablet Oral HS PRN         VTE Pharmacologic Prophylaxis: Warfarin (Coumadin)  VTE Mechanical Prophylaxis: sequential compression device    Kendall Kendrick DO

## 2018-03-19 NOTE — RESPIRATORY THERAPY NOTE
resp  protocol      03/19/18 0800   Respiratory Assessment   Assessment Type Assess only   General Appearance Awake; Alert   Respiratory Pattern Normal   Chest Assessment Chest expansion symmetrical   Bilateral Breath Sounds Diminished;Clear   Resp Comments pt has no resp c/o  uses prn mdi at home for sob wheezing  will add prn mdi to med list, and d/c prn udn  will d/c pt from resp  protocol     Subjective Data pt nervous about ct this am    Additional Assessments   SpO2 97 %

## 2018-03-19 NOTE — ANESTHESIA PREPROCEDURE EVALUATION
Review of Systems/Medical History  Patient summary reviewed  Chart reviewed  No history of anesthetic complications     Cardiovascular  EKG reviewed, Exercise tolerance: good,  Hyperlipidemia, Hypertension on > 1 medication, Valvular heart disease , aortic valve stenosis and mitral valve stenosis, Past MI > 6 months, CAD, , History of CABG, Dysrhythmias, atrial fibrillation, CHF compensated CHF, PVD,   Pulmonary hypertension Pulmonary  Asthma: well controlled/ stable Last rescue: < 1 week ago Asthma type of rescue: PRN inhaler,        GI/Hepatic    GERD well controlled,             Endo/Other  Diabetes type 2 , History of thyroid disease , hypothyroidism,      GYN       Hematology   Musculoskeletal       Neurology    TIA, Headaches,    Psychology           Physical Exam    Airway    Mallampati score: II  TM Distance: >3 FB  Neck ROM: full     Dental   upper dentures and lower dentures,     Cardiovascular      Pulmonary      Other Findings        Anesthesia Plan  ASA Score- 4     Anesthesia Type- IV sedation with anesthesia with ASA Monitors  Additional Monitors:   Airway Plan:         Plan Factors-    Induction-     Postoperative Plan-     Informed Consent- Anesthetic plan and risks discussed with patient  I personally reviewed this patient with the CRNA  Discussed and agreed on the Anesthesia Plan with the CAMERON Kaminski

## 2018-03-19 NOTE — PROGRESS NOTES
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%  On beta blocker  Possible initiation of ACE I if BP tolerates  This is a new finding, prior EF by stress test 10/15 was normal at 57%  Follows with Dr Regan Boudreaux as outpt  Subjective/Objective   Chief Complaint:   Chief Complaint   Patient presents with    Respiratory Distress     patient is  a medical emergency from [address]  Soon after her procedure, after laying flat for CT, she had a sudden onset of severe respiratory distress  Patient was pre medicated for the procedure with 2 doses of solumedrol secondary to prior reaction to IV dy  Subjective: 76year old woman with a history of severe AS, HTN, HL, chronic combined systolic and diastolic HF, CAD s/p CABG, admitted with acute/chronic combined HF, being diuresed  Cardiac cath 3/19/18 showed SVG to D1 with 50% stenosis, SVG to OM1 occluded, SVG to RPDA occluded, diagonal branches all occluded, mid LAD 90% stenosis  ROBB showed EF 35%, moderate MS with mild to moderate MR, severe AS with mean gradient 30 mm Hg, calculated CHER 0 46 cm squared  PASP 60 mm Hg  She has been diuresed, reports her breathing is much better, no current SOB or orthopnea  Had cardiac cath and ROBB earlier today, just feels tired currently  No fevers  No significant CP  Mild LE edema         Patient Active Problem List   Diagnosis    PAD (peripheral artery disease) ([facility] [address] )    Essential hypertension    Critical aortic valve stenosis    CAD (coronary artery disease)    DM (diabetes mellitus) type II uncontrolled, periph vascular disorder (HCC)    Atrial flutter (HCC)    Hyperlipidemia    Gastroesophageal reflux disease without esophagitis    Moderate mitral stenosis    Acute combined systolic and diastolic congestive heart failure ([facility])    URI, acute    Arthritis of hand    Acute cardiogenic pulmonary edema ([facility])    Asthma    Atrial fibrillation with rapid ventricular response ([facility])     Past Medical History:   Diagnosis Date    Altered mental status     Anticoagulated     Coumadin for Aflutter    Asthma     Atrial flutter ([facility])     maintained on coumadin anticoag    CAD (coronary artery disease)     Candidiasis     Carotid stenosis, bilateral     Cataract     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) ([facility])     Chronic fatigue syndrome     Chronic low back pain     Concussion w loss of consciousness of unsp duration, init     Coronary artery disease     Diabetes mellitus (HCC)     type 2, insulin dependent    DJD (degenerative joint disease)     GERD (gastroesophageal reflux disease)     Herpes zoster     HLD (hyperlipidemia)     HTN (hypertension)     Hyperlipidemia     Hypothyroidism     Irritable bowel syndrome     Lumbar radiculopathy     Lyme disease     Dx in hospital 8/2011    Lyme disease     MI (myocardial infarction)     Migraines     Muscle spasm     Non-neoplastic nevus     Nontoxic multinodular goiter     OA (osteoarthritis)     Osteoarthritis     Other chronic pain     PAD (peripheral artery disease) ([facility])     Palpitations     Pseudogout     Spinal stenosis     Transient cerebral ischemia     Trigger ring finger     Viral gastroenteritis        Allergies   Allergen Reactions    Other Chest Pain     IVP-listed as "chest pain" in previous chart, patient stated this occurred "a long time ago" but does not remember exactly occurred     Cephalosporins Chest Pain    Contrast [Iodinated Diagnostic Agents] Other (See Comments)     Flash pulm edema    Doxycycline Chest Pain    Levaquin [Levofloxacin] Chest Pain    Ondansetron Chest Pain     Prolonged QT    Toradol [Ketorolac Tromethamine] Chest Pain       Current Facility-Administered Medications   Medication Dose Route Frequency Provider Last Rate Last Dose    acetaminophen (TYLENOL) tablet 650 mg  650 mg Oral Q6H PRN Alex Cosme MD        albuterol (PROVENTIL HFA,VENTOLIN HFA) inhaler 2 puff  2 puff Inhalation Q4H PRN Olga Craven MD        aspirin chewable tablet 81 mg  81 mg Oral Daily Alex Cosme MD   81 mg at 03/19/18 0840    atorvastatin (LIPITOR) tablet 80 mg  80 mg Oral Daily With Vivien Tellez MD   80 mg at 03/18/18 1632    benzonatate (TESSALON PERLES) capsule 100 mg  100 mg Oral TID PRN Alex Cosme MD        bisacodyl (DULCOLAX) EC tablet 5 mg  5 mg Oral Daily PRN Jocelyn Kwong DO        calcium carbonate (TUMS) chewable tablet 500 mg  500 mg Oral PRN Alex Cosme MD        chlorhexidine (PERIDEX) 0 12 % oral rinse 15 mL  15 mL Swish & Spit Q12H [address] Alex Cosme MD   15 mL at 03/19/18 0841    cholecalciferol (VITAMIN D3) tablet 1,000 Units  1,000 Units Oral Daily Alex Cosme MD   Stopped at 03/19/18 0839    diphenhydrAMINE (BENADRYL) tablet 50 mg  50 mg Oral Once Nadeem Madrid MD        fluticasone [facility]FA) 110 MCG/ACT inhaler 1 puff  1 puff Inhalation Q12H Isaias Mcgill MD   1 puff at 03/19/18 0840    furosemide (LASIX) injection 40 mg  40 mg Intravenous Daily Nadeem Madrid MD   40 mg at 03/19/18 0840    insulin glargine (LANTUS) subcutaneous injection 30 Units  30 Units Subcutaneous Q12H Cesar 7, DO   15 Units at 03/19/18 0839    insulin lispro (HumaLOG) 100 units/mL subcutaneous injection 2-12 Units  2-12 Units Subcutaneous TID [facility] Marley Guevara MD   8 Units at 03/19/18 0602    insulin lispro (HumaLOG) 100 units/mL subcutaneous injection 2-12 Units  2-12 Units Subcutaneous HS Flor Clemente MD   8 Units at 03/18/18 2123    levothyroxine tablet 100 mcg  100 mcg Oral Early Morning Canelo Hill MD   100 mcg at 03/19/18 0518    loratadine (CLARITIN) tablet 10 mg  10 mg Oral Daily Canelo Hill MD   10 mg at 03/19/18 0841    magnesium oxide (MAG-OX) tablet 400 mg  400 mg Oral BID Canelo Hill MD   Stopped at 03/19/18 6718    metoprolol succinate (TOPROL-XL) 24 hr tablet 100 mg  100 mg Oral HS Canelo Hill MD   100 mg at 03/18/18 2123    metoprolol succinate (TOPROL-XL) 24 hr tablet 50 mg  50 mg Oral Daily With Breakfast Canelo Hill MD   50 mg at 03/19/18 0841    pantoprazole (PROTONIX) EC tablet 40 mg  40 mg Oral Early Morning RAMO Travis   40 mg at 03/19/18 0518    potassium chloride (K-DUR,KLOR-CON) CR tablet 10 mEq  10 mEq Oral BID Lizbeth Vega MD   10 mEq at 03/19/18 0841    predniSONE tablet 50 mg  50 mg Oral Q6H Lizbeth Vega MD   50 mg at 03/19/18 0518    senna-docusate sodium (SENOKOT S) 8 6-50 mg per tablet 1 tablet  1 tablet Oral HS PRN Jovanny Hopper, DO           Vitals: /81 (BP Location: Left arm) Comment: Map 115  Pulse 77   Temp 97 8 °F (36 6 °C) (Oral)   Resp 18   Ht 5' 4" (1 626 m)   Wt 74 4 kg (164 lb 0 4 oz)   LMP  (LMP Unknown)   SpO2 97%   BMI 28 15 kg/m²     Intake/Output Summary (Last 24 hours) at 03/19/18 0943  Last data filed at 03/19/18 0559   Gross per 24 hour   Intake             1345 ml   Output             2925 ml   Net            -1580 ml     Wt Readings from Last 3 Encounters:   03/19/18 74 4 kg (164 lb 0 4 oz)   03/12/18 77 8 kg (171 lb 9 6 oz)   02/26/18 76 2 kg (168 lb)       Body mass index is 28 15 kg/m²  ,     Vitals:    03/18/18 1909 03/18/18 2302 03/19/18 0251 03/19/18 0715   BP: 142/67 131/63 130/71 164/81   Pulse: 87 85 82 77   Patient Position - Orthostatic VS: Sitting Lying Lying Sitting       Physical Exam:     GEN: Awake and alert, in no acute distress  HEENT: Sclera anicteric, conjunctivae pink, mucous membranes moist   NECK: Supple, no carotid bruits, no significant JVD  HEART: Regular rhythm, III/VI systolic murmur, no clicks, gallops or rubs  LUNGS: Clear to auscultation anteriorly bilaterally; no wheezes, rales, or rhonchi   ABDOMEN: Soft, nontender, nondistended, normoactive bowel sounds  EXTREMITIES: Skin warm and well perfused, no clubbing, cyanosis, or edema  NEURO: No focal findings  SKIN: Normal without suspicious lesions on exposed skin        Lab Results:     BMP:  Results from last 7 days  Lab Units 03/19/18  0433 03/18/18  0452 03/17/18  0436 03/16/18  0506 03/15/18  1121   SODIUM mmol/L 137 136 137 141 134*   POTASSIUM mmol/L 4 2 3 8 4 0 4 5 3 3*   CHLORIDE mmol/L 102 101 102 105 99*   CO2 mmol/L 25 29 30 28 19*   BUN mg/dL 31* 29* 32* 30* 28*   CREATININE mg/dL 0 93 0 89 0 86 0 83 1 40*   GLUCOSE RANDOM mg/dL 362* 157* 156* 122 389*   CALCIUM mg/dL 8 1* 7 4* 7 1* 7 0* 7 4*       CBC:   Results from last 7 days  Lab Units 03/19/18  0433 03/17/18  0436 03/16/18  1508 03/16/18  0506 03/15/18  1121 03/15/18  0840   WBC Thousand/uL 11 67* 10 40*  --  14 72* 15 45*  --    HEMOGLOBIN g/dL 10 3* 9 5* 9 8* 9 6* 12 2  --    I STAT HEMOGLOBIN g/dl  --   --   --   --   --  11 6   HEMATOCRIT % 34 4* 30 9*  --  30 7* 40 1  --    MCV fL 69* 68*  --  68* 70*  --    PLATELETS Thousands/uL 372 310  --  328 472*  --    MCH pg 20 6* 20 9*  --  21 2* 21 3*  --    MCHC g/dL 29 9* 30 7*  --  31 3* 30 4*  --    RDW % 17 6* 18 0*  --  17 9* 17 9*  --    MPV fL 10 8 10 1  --  10 1 10 7  --    NRBC AUTO /100 WBCs  --  0  --  0 0  --          Results from last 7 days  Lab Units 03/16/18  0508 03/16/18  0208 03/15/18  2302   TROPONIN I ng/mL 2 02* 2 31* 2 20*         Results from last 7 days  Lab Units 03/15/18  1331   NT-PRO BNP pg/mL 12,681*           Results from last 7 days  Lab Units 03/17/18  0436 18  1508 18  0506   MAGNESIUM mg/dL 2 3 3 3* 1 2*       INR:     Results from last 7 days  Lab Units 18  0433 18  0436 18  0747 03/15/18  1331   INR  1 27* 1 93* 2 06* 2 14*       Lipid Profile:   Lab Results   Component Value Date    CHOL 147 2017    CHOL 140 2016    CHOL 143 2016     Lab Results   Component Value Date    HDL 42 2017    HDL 52 2016    HDL 43 2016     Lab Results   Component Value Date    LDLCALC 63 2017    LDLCALC 50 2016    LDLCALC 64 2016     Lab Results   Component Value Date    TRIG 210 (H) 2017    TRIG 189 (H) 2016    TRIG 178 (H) 2016         Hgb A1c:   Results from last 7 days  Lab Units 18  0436   HEMOGLOBIN A1C % 7 5*         Telemetry: personally reviewed, SR, up to 9 beats NSVT  Cardiac testing:   Results for orders placed during the hospital encounter of 03/15/18   Echo complete with contrast if indicated    Narrative [facility] [address]  [facility] [place] [address] [facility]  [phone]    Transthoracic Echocardiogram  2D, M-mode, Doppler, and Color Doppler    Study date:  15-Mar-2018    Patient: Leanor Hodgkin  MR number: [id number]  Account number: [de-identified]  : 1940  Age: 76 years  Gender: Female  Status: Inpatient  Location: Bedside  Height: 64 in  Weight: 171 lb  BP: 128/ 88 mmHg    Indications: Edema    Diagnoses: R60 9 - Edema, unspecified    Sonographer:  SIOBHAN Abel  Primary Physician:  Rolo Tsang MD  Referring Physician:  Cleo Middleton MD  Group:  [id number]  Interpreting Physician:  DO VIVEK Mendoza    LEFT VENTRICLE:  Systolic function was moderately to markedly reduced  Ejection fraction was estimated in the range of 35 % to 40 %  There was moderate diffuse hypokinesis  Wall thickness was moderately increased  There was moderate concentric hypertrophy      VENTRICULAR SEPTUM:  There was dyssynergic motion  RIGHT VENTRICLE:  The ventricle was mildly dilated  Systolic function was normal     LEFT ATRIUM:  The atrium was moderately to markedly dilated  RIGHT ATRIUM:  The atrium was moderately dilated  MITRAL VALVE:  There was marked annular calcification  There was moderate-marked diffuse thickening of the anterior and posterior leaflets  There was moderate-marked calcification of the anterior and posterior leaflets  Transmitral velocity was increased due to valvular stenosis  There was moderate to severe stenosis (MVA 0 9 cm2/MG 7 mmHg)  There was mild regurgitation  AORTIC VALVE:  The valve was probably trileaflet  Leaflets exhibited markedly increased thickness and marked calcification  There was severe stenosis  There was mild regurgitation  Valve mean gradient was 45 mmHg  Aortic valve area was 0 4 cm squared by the continuity equation  TRICUSPID VALVE:  There was mild regurgitation  Estimated peak PA pressure was 69 mmHg  The findings suggest severe pulmonary hypertension  HISTORY: PRIOR HISTORY: MI, CAD, Atrial Flutter, Severe AS, Palpitations, Hypertension, Hyperlipidemia    PROCEDURE: The procedure was performed at the bedside  This was a routine study  The transthoracic approach was used  The study included complete 2D imaging, M-mode, complete spectral Doppler, and color Doppler  The heart rate was 115 bpm,  at the start of the study  Images were obtained from the parasternal, apical, subcostal, and suprasternal notch acoustic windows  Echocardiographic views were limited due to lung interference  This was a technically difficult study  LEFT VENTRICLE: Size was normal  Systolic function was moderately to markedly reduced  Ejection fraction was estimated in the range of 35 % to 40 %  There was moderate diffuse hypokinesis  Wall thickness was moderately increased  There was  moderate concentric hypertrophy   DOPPLER: The study was not technically sufficient to allow evaluation of LV diastolic function  VENTRICULAR SEPTUM: There was dyssynergic motion  RIGHT VENTRICLE: The ventricle was mildly dilated  Systolic function was normal  Wall thickness was normal     LEFT ATRIUM: The atrium was moderately to markedly dilated  RIGHT ATRIUM: The atrium was moderately dilated  MITRAL VALVE: There was marked annular calcification  There was moderate-marked diffuse thickening of the anterior and posterior leaflets  There was moderate-marked calcification of the anterior and posterior leaflets  DOPPLER: Transmitral  velocity was increased due to valvular stenosis  There was moderate to severe stenosis (MVA 0 9 cm2/MG 7 mmHg)  There was mild regurgitation  AORTIC VALVE: The valve was probably trileaflet  Leaflets exhibited markedly increased thickness and marked calcification  DOPPLER: There was severe stenosis  There was mild regurgitation  TRICUSPID VALVE: The valve structure was normal  There was normal leaflet separation  DOPPLER: The transtricuspid velocity was within the normal range  There was no evidence for stenosis  There was mild regurgitation  Pulmonary artery  systolic pressure was markedly increased  Estimated peak PA pressure was 69 mmHg  The findings suggest severe pulmonary hypertension  PULMONIC VALVE: Leaflets exhibited normal thickness, no calcification, and normal cuspal separation  DOPPLER: The transpulmonic velocity was within the normal range  There was trace regurgitation  PERICARDIUM: There was no pericardial effusion  The pericardium was normal in appearance  AORTA: The root exhibited normal size  SYSTEMIC VEINS: IVC: The inferior vena cava was not well visualized      MEASUREMENT TABLES    DOPPLER MEASUREMENTS  Aortic valve   (Reference normals)  Peak gradient   81 mmHg   (--)  Mean gradient   45 mmHg   (--)  Valve area, cont   0 4 cm squared   (--)    SYSTEM MEASUREMENT TABLES    2D  %FS: 18 33 %  Ao Diam: 2 72 cm  EDV(Teich): 114 98 ml  EF Biplane: 41 48 %  EF(Teich): 37 87 %  ESV(Teich): 71 44 ml  IVSd: 1 3 cm  LA Area: 24 79 cm2  LA Diam: 4 04 cm  LVEDV MOD A2C: 104 33 ml  LVEDV MOD A4C: 63 07 ml  LVEDV MOD BP: 81 7 ml  LVEF MOD A2C: 51 86 %  LVEF MOD A4C: 34 6 %  LVESV MOD A2C: 50 23 ml  LVESV MOD A4C: 41 25 ml  LVESV MOD BP: 47 81 ml  LVIDd: 4 94 cm  LVIDs: 4 03 cm  LVLd A2C: 8 04 cm  LVLd A4C: 7 91 cm  LVLs A2C: 7 57 cm  LVLs A4C: 6 74 cm  LVOT Diam: 2 02 cm  LVPWd: 1 3 cm  RA Area: 25 58 cm2  RVIDd: 4 33 cm  SV MOD A2C: 54 1 ml  SV MOD A4C: 21 82 ml  SV(Teich): 43 54 ml    CW  AV Env  Ti: 336 25 ms  AV VTI: 102 59 cm  AV Vmax: 4 38 m/s  AV Vmean: 3 05 m/s  AV maxP 8 mmHg  AV meanP 1 mmHg  MV PHT: 57 2 ms  MVA By PHT: 3 85 cm2  TR Vmax: 3 83 m/s  TR maxP 53 mmHg    MM  TAPSE: 1 87 cm    PW  CHER (VTI): 0 37 cm2  CHER Vmax: 0 4 cm2  LVOT Env  Ti: 304 ms  LVOT VTI: 11 77 cm  LVOT Vmax: 0 56 m/s  LVOT Vmean: 0 39 m/s  LVOT maxP 24 mmHg  LVOT meanP 7 mmHg  LVSV Dopp: 37 56 ml    [facility] Accredited Echocardiography [facility]    Prepared and electronically signed by    Dano Lux DO  Signed 16-Mar-2018 08:35:18

## 2018-03-20 VITALS
SYSTOLIC BLOOD PRESSURE: 139 MMHG | TEMPERATURE: 98.2 F | HEART RATE: 71 BPM | OXYGEN SATURATION: 97 % | DIASTOLIC BLOOD PRESSURE: 70 MMHG | HEIGHT: 64 IN | WEIGHT: 169.75 LBS | BODY MASS INDEX: 28.98 KG/M2 | RESPIRATION RATE: 18 BRPM

## 2018-03-20 LAB
ANION GAP SERPL CALCULATED.3IONS-SCNC: 7 MMOL/L (ref 4–13)
BUN SERPL-MCNC: 27 MG/DL (ref 5–25)
CALCIUM SERPL-MCNC: 7.4 MG/DL (ref 8.3–10.1)
CHLORIDE SERPL-SCNC: 109 MMOL/L (ref 100–108)
CO2 SERPL-SCNC: 25 MMOL/L (ref 21–32)
CREAT SERPL-MCNC: 0.81 MG/DL (ref 0.6–1.3)
ERYTHROCYTE [DISTWIDTH] IN BLOOD BY AUTOMATED COUNT: 17.7 % (ref 11.6–15.1)
GFR SERPL CREATININE-BSD FRML MDRD: 69 ML/MIN/1.73SQ M
GLUCOSE SERPL-MCNC: 175 MG/DL (ref 65–140)
GLUCOSE SERPL-MCNC: 190 MG/DL (ref 65–140)
GLUCOSE SERPL-MCNC: 265 MG/DL (ref 65–140)
GLUCOSE SERPL-MCNC: 291 MG/DL (ref 65–140)
HCT VFR BLD AUTO: 29.5 % (ref 34.8–46.1)
HGB BLD-MCNC: 9 G/DL (ref 11.5–15.4)
INR PPP: 1.4 (ref 0.86–1.16)
MCH RBC QN AUTO: 21.3 PG (ref 26.8–34.3)
MCHC RBC AUTO-ENTMCNC: 30.5 G/DL (ref 31.4–37.4)
MCV RBC AUTO: 70 FL (ref 82–98)
PLATELET # BLD AUTO: 326 THOUSANDS/UL (ref 149–390)
PMV BLD AUTO: 10.5 FL (ref 8.9–12.7)
POTASSIUM SERPL-SCNC: 3.9 MMOL/L (ref 3.5–5.3)
PROTHROMBIN TIME: 17.2 SECONDS (ref 12.1–14.4)
RBC # BLD AUTO: 4.23 MILLION/UL (ref 3.81–5.12)
SODIUM SERPL-SCNC: 141 MMOL/L (ref 136–145)
WBC # BLD AUTO: 14.9 THOUSAND/UL (ref 4.31–10.16)

## 2018-03-20 PROCEDURE — 80048 BASIC METABOLIC PNL TOTAL CA: CPT | Performed by: INTERNAL MEDICINE

## 2018-03-20 PROCEDURE — 82948 REAGENT STRIP/BLOOD GLUCOSE: CPT

## 2018-03-20 PROCEDURE — 99239 HOSP IP/OBS DSCHRG MGMT >30: CPT | Performed by: INTERNAL MEDICINE

## 2018-03-20 PROCEDURE — 85610 PROTHROMBIN TIME: CPT | Performed by: INTERNAL MEDICINE

## 2018-03-20 PROCEDURE — 99233 SBSQ HOSP IP/OBS HIGH 50: CPT | Performed by: INTERNAL MEDICINE

## 2018-03-20 PROCEDURE — 85027 COMPLETE CBC AUTOMATED: CPT | Performed by: INTERNAL MEDICINE

## 2018-03-20 RX ORDER — FUROSEMIDE 40 MG/1
40 TABLET ORAL
Status: DISCONTINUED | OUTPATIENT
Start: 2018-03-20 | End: 2018-03-20 | Stop reason: HOSPADM

## 2018-03-20 RX ORDER — LISINOPRIL 5 MG/1
5 TABLET ORAL DAILY
Status: DISCONTINUED | OUTPATIENT
Start: 2018-03-20 | End: 2018-03-20 | Stop reason: HOSPADM

## 2018-03-20 RX ORDER — POTASSIUM CHLORIDE 750 MG/1
10 TABLET, EXTENDED RELEASE ORAL 2 TIMES DAILY
Qty: 60 TABLET | Refills: 0 | Status: SHIPPED | OUTPATIENT
Start: 2018-03-20 | End: 2018-04-19 | Stop reason: HOSPADM

## 2018-03-20 RX ORDER — FUROSEMIDE 40 MG/1
40 TABLET ORAL
Qty: 60 TABLET | Refills: 0 | Status: SHIPPED | OUTPATIENT
Start: 2018-03-20 | End: 2018-04-19 | Stop reason: HOSPADM

## 2018-03-20 RX ADMIN — SODIUM CHLORIDE 100 ML/HR: 0.9 INJECTION, SOLUTION INTRAVENOUS at 00:16

## 2018-03-20 RX ADMIN — ASPIRIN 81 MG 81 MG: 81 TABLET ORAL at 10:17

## 2018-03-20 RX ADMIN — INSULIN LISPRO 2 UNITS: 100 INJECTION, SOLUTION INTRAVENOUS; SUBCUTANEOUS at 06:06

## 2018-03-20 RX ADMIN — CHLORHEXIDINE GLUCONATE 15 ML: 1.2 RINSE ORAL at 10:18

## 2018-03-20 RX ADMIN — INSULIN LISPRO 6 UNITS: 100 INJECTION, SOLUTION INTRAVENOUS; SUBCUTANEOUS at 12:23

## 2018-03-20 RX ADMIN — METOPROLOL SUCCINATE 50 MG: 50 TABLET, FILM COATED, EXTENDED RELEASE ORAL at 10:20

## 2018-03-20 RX ADMIN — FUROSEMIDE 40 MG: 40 TABLET ORAL at 10:17

## 2018-03-20 RX ADMIN — INSULIN GLARGINE 30 UNITS: 100 INJECTION, SOLUTION SUBCUTANEOUS at 10:19

## 2018-03-20 RX ADMIN — POTASSIUM CHLORIDE 10 MEQ: 750 TABLET, EXTENDED RELEASE ORAL at 10:20

## 2018-03-20 RX ADMIN — VITAMIN D, TAB 1000IU (100/BT) 1000 UNITS: 25 TAB at 10:18

## 2018-03-20 RX ADMIN — LORATADINE 10 MG: 10 TABLET ORAL at 10:19

## 2018-03-20 RX ADMIN — LISINOPRIL 5 MG: 5 TABLET ORAL at 10:17

## 2018-03-20 RX ADMIN — PANTOPRAZOLE SODIUM 40 MG: 40 TABLET, DELAYED RELEASE ORAL at 06:07

## 2018-03-20 RX ADMIN — LEVOTHYROXINE SODIUM 100 MCG: 100 TABLET ORAL at 06:06

## 2018-03-20 RX ADMIN — Medication 400 MG: at 10:19

## 2018-03-20 RX ADMIN — FLUTICASONE PROPIONATE 1 PUFF: 110 AEROSOL, METERED RESPIRATORY (INHALATION) at 10:18

## 2018-03-20 NOTE — SOCIAL WORK
Patient identified as HRR per criteria  Call made to DC appointment hotline with information as required for CM support follow up

## 2018-03-20 NOTE — PROGRESS NOTES
Progress Note - Cardiology   Jonas Reed 76 y o  female MRN: [id number]  Encounter: [id number]  03/20/18  9:18 AM        Assessment/Plan:    1  Acute/chronic combined systolic/diastolic HF  Will transition IV to PO Lasix today, creatinine stable  Was taking Lasix 40 mg PO daily prior to admission, would recommend Lasix 40 PO bid for discharge, with follow up BMP in one week  Continue KCL 10 meq bid for discharge  2  Paroxysmal afib  Rate controlled on Metoprolol  Resumed Coumadin 3/19, goal INR 2-3  3  CAD  On aspirin, statin, and beta blocker  Significant CAD by cath on this admission  4  Severe AS  Evaluation for suitability for TAVR as outpt ongoing  5  DM  On insulin  6  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%  On beta blocker  On Lisinopril 5, home med  Decreased EF is a new finding, prior EF by stress test 10/15 was normal at 57%  May be combination of severe AS and CAD  Follows with Dr Arpita Boles as outpt  Subjective/Objective   Chief Complaint:   Chief Complaint   Patient presents with    Respiratory Distress     patient is  a medical emergency from [address]  Soon after her procedure, after laying flat for CT, she had a sudden onset of severe respiratory distress  Patient was pre medicated for the procedure with 2 doses of solumedrol secondary to prior reaction to IV dy  Subjective: 76year old woman with a history of severe AS, HTN, HL, chronic combined systolic and diastolic HF, CAD s/p CABG, admitted with acute/chronic combined HF, being diuresed  Cardiac cath 3/19/18 showed SVG to D1 with 50% stenosis, SVG to OM1 occluded, SVG to RPDA occluded, diagonal branches all occluded, mid LAD 90% stenosis  ROBB showed EF 35%, moderate MS with mild to moderate MR, severe AS with mean gradient 30 mm Hg, calculated CHER 0 46 cm squared  PASP 60 mm Hg  She has been diuresed, reports her breathing is much better, no current SOB or orthopnea   Ambulated earlier, reports she had to stop and rest a few times, due to SOB  No fevers  No significant CP  Mild LE edema  No dizziness or lightheadedness         Patient Active Problem List   Diagnosis    [facility]AD [facility]peripheral artery disease) [facility]    Essential hypertension    Critical aortic valve stenosis    CAD (coronary artery disease)    DM (diabetes mellitus) type II uncontrolled, periph vascular disorder (HCC)    Atrial flutter (HCC)    Hyperlipidemia    Gastroesophageal reflux disease without esophagitis    Moderate mitral stenosis    Acute combined systolic and diastolic congestive heart failure ([facility])    URI, acute    Arthritis of hand    Acute cardiogenic pulmonary edema ([facility])    Asthma    [facility] with [facility] response [facility]     Past Medical History:   Diagnosis Date    Altered mental status     Anticoagulated     Coumadin for Aflutter    Asthma     Atrial flutter ([facility])     maintained on coumadin anticoag    CAD (coronary artery disease)     Candidiasis     Carotid stenosis, bilateral     Cataract     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) ([facility])     Chronic fatigue syndrome     Chronic low back pain     Concussion w loss of consciousness of unsp duration, init     Coronary artery disease     Diabetes mellitus ([facility] )     type 2, insulin dependent    DJD (degenerative joint disease)     GERD (gastroesophageal reflux disease)     Herpes zoster     HLD (hyperlipidemia)     HTN (hypertension)     Hyperlipidemia     Hypothyroidism     Irritable bowel syndrome     Lumbar radiculopathy     Lyme disease     Dx in hospital 8/2011    Lyme disease     MI (myocardial infarction)     Migraines     Muscle spasm     Non-neoplastic nevus     Nontoxic multinodular goiter     OA (osteoarthritis)     Osteoarthritis     Other chronic pain     PAD (peripheral artery disease) ([facility])     Palpitations     Pseudogout     Spinal stenosis     Transient cerebral ischemia     Trigger ring finger     Viral gastroenteritis        Allergies   Allergen Reactions    Other Chest Pain     IVP-listed as "chest pain" in previous chart, patient stated this occurred "a long time ago" but does not remember exactly occurred     Cephalosporins Chest Pain    Contrast [Iodinated Diagnostic Agents] Other (See Comments)     Flash pulm edema    Doxycycline Chest Pain    Levaquin [Levofloxacin] Chest Pain    Ondansetron Chest Pain     Prolonged QT    Toradol [Ketorolac Tromethamine] Chest Pain       Current Facility-Administered Medications   Medication Dose Route Frequency Provider Last Rate Last Dose    acetaminophen (TYLENOL) tablet 650 mg  650 mg Oral Q6H PRN Maria Alejandra Gallagher MD        albuterol (PROVENTIL HFA,VENTOLIN HFA) inhaler 2 puff  2 puff Inhalation Q4H PRN Berta Gonzales MD        aspirin chewable tablet 81 mg  81 mg Oral Daily Maria Alejandra Gallagher MD   81 mg at 03/19/18 0840    atorvastatin (LIPITOR) tablet 80 mg  80 mg Oral Daily With Tip Valiente MD   80 mg at 03/19/18 1841    benzonatate (TESSALON PERLES) capsule 100 mg  100 mg Oral TID PRN Maria Alejandra Gallagher MD        bisacodyl (DULCOLAX) EC tablet 5 mg  5 mg Oral Daily PRN Lyndol Gretel,         calcium carbonate (TUMS) chewable tablet 500 mg  500 mg Oral PRN Maria Alejandra Gallagher MD        chlorhexidine (PERIDEX) 0 12 % oral rinse 15 mL  15 mL Swish & Spit Q12H [address] Maria Alejandra Gallagher MD   15 mL at 03/19/18 2126    cholecalciferol (VITAMIN D3) tablet 1,000 Units  1,000 Units Oral Daily Maria Alejandra Gallagher MD   Stopped at 03/19/18 0839    fluticasone (FLOVENT HFA) 110 MCG/ACT inhaler 1 puff  1 puff Inhalation Q12H [address] Maria Alejandra Gallagher MD   1 puff at 03/19/18 2127    furosemide (LASIX) injection 40 mg  40 mg Intravenous Daily Jorge Dueñas MD   40 mg at 03/19/18 0840    insulin glargine (LANTUS) subcutaneous injection 30 Units  30 Units Subcutaneous Q12H [address] Jacek Galloway DO   30 Units at 03/19/18 2126    insulin lispro (HumaLOG) 100 units/mL subcutaneous injection 2-12 Units  2-12 Units Subcutaneous TID [facility] Flor Clemente MD   2 Units at 03/20/18 0606    insulin lispro (HumaLOG) 100 units/mL subcutaneous injection 2-12 Units  2-12 Units Subcutaneous HS Flor Clemente MD   6 Units at 03/19/18 2128    levothyroxine tablet 100 mcg  100 mcg Oral Early Morning Canelo Hill MD   100 mcg at 03/20/18 0606    lisinopril (ZESTRIL) tablet 5 mg  5 mg Oral Daily Jovanny Schneider DO        loratadine (CLARITIN) tablet 10 mg  10 mg Oral Daily Canelo Hill MD   10 mg at 03/19/18 0841    magnesium oxide (MAG-OX) tablet 400 mg  400 mg Oral BID Canelo Hill MD   400 mg at 03/19/18 1841    metoprolol succinate (TOPROL-XL) 24 hr tablet 100 mg  100 mg Oral HS Canelo Hill MD   100 mg at 03/19/18 2127    metoprolol succinate (TOPROL-XL) 24 hr tablet 50 mg  50 mg Oral Daily With Breakfast Canelo Hill MD   50 mg at 03/19/18 0841    pantoprazole (PROTONIX) EC tablet 40 mg  40 mg Oral Early Morning RAMO Travis   40 mg at 03/20/18 4478    potassium chloride (K-DUR,KLOR-CON) CR tablet 10 mEq  10 mEq Oral BID Lizbeth Vega MD   10 mEq at 03/19/18 1841    senna-docusate sodium (SENOKOT S) 8 6-50 mg per tablet 1 tablet  1 tablet Oral HS PRN Jovanny Schneider DO        sodium chloride 0 9 % infusion  100 mL/hr Intravenous Continuous Samul Sacks,  mL/hr at 03/20/18 0016 100 mL/hr at 03/20/18 0016    warfarin (COUMADIN) tablet 3 mg  3 mg Oral Daily (warfarin) Samul Sacks, MD   3 mg at 03/19/18 1841       Vitals: /80 (BP Location: Left arm) Comment: Map 102  Pulse 77   Temp 98 8 °F (37 1 °C) (Oral)   Resp 18   Ht 5' 4" (1 626 m)   Wt 77 kg (169 lb 12 1 oz)   LMP  (LMP Unknown)   SpO2 96%   BMI 29 14 kg/m²     Intake/Output Summary (Last 24 hours) at 03/20/18 0918  Last data filed at 03/20/18 0902   Gross per 24 hour   Intake          2193 33 ml   Output             1351 ml   Net           842 33 ml     Wt Readings from Last 3 Encounters: 03/20/18 77 kg (169 lb 12 1 oz)   03/12/18 77 8 kg (171 lb 9 6 oz)   02/26/18 76 2 kg (168 lb)       Body mass index is 29 14 kg/m²  ,     Vitals:    03/19/18 2127 03/19/18 2305 03/20/18 0255 03/20/18 0725   BP: 136/60 119/59 117/58 141/80   Pulse: 74 66 66 77   Patient Position - Orthostatic VS:  Lying Lying Sitting       Physical Exam:     GEN: Awake and alert, in no acute distress  HEENT: Sclera anicteric, conjunctivae pink, mucous membranes moist   NECK: Supple, no carotid bruits, no significant JVD  HEART: Regular rhythm, III/VI systolic murmur, no clicks, gallops or rubs  LUNGS: Clear to auscultation anteriorly bilaterally; no wheezes, rales, or rhonchi   ABDOMEN: Soft, nontender, nondistended, normoactive bowel sounds  EXTREMITIES: Skin warm and well perfused, no clubbing, cyanosis, trace edema  NEURO: No focal findings  SKIN: Normal without suspicious lesions on exposed skin        Lab Results:     BMP:    Results from last 7 days  Lab Units 03/20/18  0450 03/19/18  0433 03/18/18  0452 03/17/18  0436 03/16/18  0506 03/15/18  1121   SODIUM mmol/L 141 137 136 137 141 134*   POTASSIUM mmol/L 3 9 4 2 3 8 4 0 4 5 3 3*   CHLORIDE mmol/L 109* 102 101 102 105 99*   CO2 mmol/L 25 25 29 30 28 19*   BUN mg/dL 27* 31* 29* 32* 30* 28*   CREATININE mg/dL 0 81 0 93 0 89 0 86 0 83 1 40*   GLUCOSE RANDOM mg/dL 190* 362* 157* 156* 122 389*   CALCIUM mg/dL 7 4* 8 1* 7 4* 7 1* 7 0* 7 4*       CBC:     Results from last 7 days  Lab Units 03/20/18  0450 03/19/18  0433 03/17/18  0436 03/16/18  1508 03/16/18  0506 03/15/18  1121 03/15/18  0840   WBC Thousand/uL 14 90* 11 67* 10 40*  --  14 72* 15 45*  --    HEMOGLOBIN g/dL 9 0* 10 3* 9 5* 9 8* 9 6* 12 2  --    I STAT HEMOGLOBIN g/dl  --   --   --   --   --   --  11 6   HEMATOCRIT % 29 5* 34 4* 30 9*  --  30 7* 40 1  --    MCV fL 70* 69* 68*  --  68* 70*  --    PLATELETS Thousands/uL 326 372 310  --  328 472*  --    MCH pg 21 3* 20 6* 20 9*  --  21 2* 21 3*  --    MCHC g/dL 30 5* 29 9* 30 7*  --  31 3* 30 4*  --    RDW % 17 7* 17 6* 18 0*  --  17 9* 17 9*  --    MPV fL 10 5 10 8 10 1  --  10 1 10 7  --    NRBC AUTO /100 WBCs  --   --  0  --  0 0  --          Results from last 7 days  Lab Units 18  0508 18  0208 03/15/18  2302   TROPONIN I ng/mL 2 02* 2 31* 2 20*         Results from last 7 days  Lab Units 03/15/18  1331   NT-PRO BNP pg/mL 12,681*             Results from last 7 days  Lab Units 18  0436 18  1508 18  0506   MAGNESIUM mg/dL 2 3 3 3* 1 2*       INR:     Results from last 7 days  Lab Units 18  0450 18  0433 18  0436 18  0747 03/15/18  1331   INR  1 40* 1 27* 1 93* 2 06* 2 14*       Lipid Profile:   Lab Results   Component Value Date    CHOL 147 2017    CHOL 140 2016    CHOL 143 2016     Lab Results   Component Value Date    HDL 42 2017    HDL 52 2016    HDL 43 2016     Lab Results   Component Value Date    LDLCALC 63 2017    LDLCALC 50 2016    LDLCALC 64 2016     Lab Results   Component Value Date    TRIG 210 (H) 2017    TRIG 189 (H) 2016    TRIG 178 (H) 2016         Hgb A1c:     Results from last 7 days  Lab Units 18  0436   HEMOGLOBIN A1C % 7 5*         Telemetry: personally reviewed, SR, sinus rodrigue in 40s, up to 9 beats NSVT         Cardiac testing:   Results for orders placed during the hospital encounter of 03/15/18   Echo complete with contrast if indicated    Narrative [address]  [facility], [address] [place] [address]  [phone]    Transthoracic Echocardiogram  2D, M-mode, Doppler, and Color Doppler    Study date:  15-Mar-2018    Patient: [facility]  MR number: [id number]  Account number: [de-identified]  : [id number]  Age: 76 years  Gender: Female  Status: Inpatient  Location: Bedside  Height: 64 in  Weight: 171 lb  BP: 128/ 88 mmHg    Indications: Edema    Diagnoses: R60 9 - Edema, unspecified    Sonographer:  Reyes Brand, RCS  Primary Physician:  Yeimy Mina MD  Referring Physician:  Bridget Lord MD  Group:  [id number]  Interpreting Physician:  Regine Theodore DO    SUMMARY    LEFT VENTRICLE:  Systolic function was moderately to markedly reduced  Ejection fraction was estimated in the range of 35 % to 40 %  There was moderate diffuse hypokinesis  Wall thickness was moderately increased  There was moderate concentric hypertrophy  VENTRICULAR SEPTUM:  There was dyssynergic motion  RIGHT VENTRICLE:  The ventricle was mildly dilated  Systolic function was normal     LEFT ATRIUM:  The atrium was moderately to markedly dilated  RIGHT ATRIUM:  The atrium was moderately dilated  MITRAL VALVE:  There was marked annular calcification  There was moderate-marked diffuse thickening of the anterior and posterior leaflets  There was moderate-marked calcification of the anterior and posterior leaflets  Transmitral velocity was increased due to valvular stenosis  There was moderate to severe stenosis (MVA 0 9 cm2/MG 7 mmHg)  There was mild regurgitation  AORTIC VALVE:  The valve was probably trileaflet  Leaflets exhibited markedly increased thickness and marked calcification  There was severe stenosis  There was mild regurgitation  Valve mean gradient was 45 mmHg  Aortic valve area was 0 4 cm squared by the continuity equation  TRICUSPID VALVE:  There was mild regurgitation  Estimated peak PA pressure was 69 mmHg  The findings suggest severe pulmonary hypertension  HISTORY: PRIOR HISTORY: MI, CAD, Atrial Flutter, Severe AS, Palpitations, Hypertension, Hyperlipidemia    PROCEDURE: The procedure was performed at the bedside  This was a routine study  The transthoracic approach was used  The study included complete 2D imaging, M-mode, complete spectral Doppler, and color Doppler  The heart rate was 115 bpm,  at the start of the study   Images were obtained from the parasternal, apical, subcostal, and suprasternal notch acoustic windows  Echocardiographic views were limited due to lung interference  This was a technically difficult study  LEFT VENTRICLE: Size was normal  Systolic function was moderately to markedly reduced  Ejection fraction was estimated in the range of 35 % to 40 %  There was moderate diffuse hypokinesis  Wall thickness was moderately increased  There was  moderate concentric hypertrophy  DOPPLER: The study was not technically sufficient to allow evaluation of LV diastolic function  VENTRICULAR SEPTUM: There was dyssynergic motion  RIGHT VENTRICLE: The ventricle was mildly dilated  Systolic function was normal  Wall thickness was normal     LEFT ATRIUM: The atrium was moderately to markedly dilated  RIGHT ATRIUM: The atrium was moderately dilated  MITRAL VALVE: There was marked annular calcification  There was moderate-marked diffuse thickening of the anterior and posterior leaflets  There was moderate-marked calcification of the anterior and posterior leaflets  DOPPLER: Transmitral  velocity was increased due to valvular stenosis  There was moderate to severe stenosis (MVA 0 9 cm2/MG 7 mmHg)  There was mild regurgitation  AORTIC VALVE: The valve was probably trileaflet  Leaflets exhibited markedly increased thickness and marked calcification  DOPPLER: There was severe stenosis  There was mild regurgitation  TRICUSPID VALVE: The valve structure was normal  There was normal leaflet separation  DOPPLER: The transtricuspid velocity was within the normal range  There was no evidence for stenosis  There was mild regurgitation  Pulmonary artery  systolic pressure was markedly increased  Estimated peak PA pressure was 69 mmHg  The findings suggest severe pulmonary hypertension  PULMONIC VALVE: Leaflets exhibited normal thickness, no calcification, and normal cuspal separation   DOPPLER: The transpulmonic velocity was within the normal range  There was trace regurgitation  PERICARDIUM: There was no pericardial effusion  The pericardium was normal in appearance  AORTA: The root exhibited normal size  SYSTEMIC VEINS: IVC: The inferior vena cava was not well visualized  MEASUREMENT TABLES    DOPPLER MEASUREMENTS  Aortic valve   (Reference normals)  Peak gradient   81 mmHg   (--)  Mean gradient   45 mmHg   (--)  Valve area, cont   0 4 cm squared   (--)    SYSTEM MEASUREMENT TABLES    2D  %FS: 18 33 %  Ao Diam: 2 72 cm  EDV(Teich): 114 98 ml  EF Biplane: 41 48 %  EF(Teich): 37 87 %  ESV(Teich): 71 44 ml  IVSd: 1 3 cm  LA Area: 24 79 cm2  LA Diam: 4 04 cm  LVEDV MOD A2C: 104 33 ml  LVEDV MOD A4C: 63 07 ml  LVEDV MOD BP: 81 7 ml  LVEF MOD A2C: 51 86 %  LVEF MOD A4C: 34 6 %  LVESV MOD A2C: 50 23 ml  LVESV MOD A4C: 41 25 ml  LVESV MOD BP: 47 81 ml  LVIDd: 4 94 cm  LVIDs: 4 03 cm  LVLd A2C: 8 04 cm  LVLd A4C: 7 91 cm  LVLs A2C: 7 57 cm  LVLs A4C: 6 74 cm  LVOT Diam: 2 02 cm  LVPWd: 1 3 cm  RA Area: 25 58 cm2  RVIDd: 4 33 cm  SV MOD A2C: 54 1 ml  SV MOD A4C: 21 82 ml  SV(Teich): 43 54 ml    CW  AV Env  Ti: 336 25 ms  AV VTI: 102 59 cm  AV Vmax: 4 38 m/s  AV Vmean: 3 05 m/s  AV maxP 8 mmHg  AV meanP 1 mmHg  MV PHT: 57 2 ms  MVA By PHT: 3 85 cm2  TR Vmax: 3 83 m/s  TR maxP 53 mmHg    MM  TAPSE: 1 87 cm    PW  CHER (VTI): 0 37 cm2  CHER Vmax: 0 4 cm2  LVOT Env  Ti: 304 ms  LVOT VTI: 11 77 cm  LVOT Vmax: 0 56 m/s  LVOT Vmean: 0 39 m/s  LVOT maxP 24 mmHg  LVOT meanP 7 mmHg  LVSV Dopp: 37 56 ml    [facility]    Prepared and electronically signed by    Dru Foster DO  Signed 16-Mar-2018 08:35:18

## 2018-03-20 NOTE — PROGRESS NOTES
IM Residency Progress Note   Unit/Bed#: [facility] 402-01 Encounter: [id number]  SOD Team C       Cathern Hamman 76 y o  female [id number]    Hospital Stay Days: 5      Assessment/Plan:    Acute on chronic diastolic CHF   - patient had previously had preserved ejection fraction of 60%  TTE on 03/15 shows EF of 35% with aortic valve area of 0 4 cm, severe mitral stenosis and severe pulmonary hypertension  -pro-BNP > 12,000 on admission  Patient with pulmonary edema with persistent active crackles on exam   -continue Lasix 40 mg IV  per cardiology  -tolerated well beta-blocker  Will need ACEI  -monitor input/output  -monitor daily weights  -kidney function at baseline    Aortic stenosis   - TTE on 03/15 shows aortic valve area of 0 4 cm  In addition TTE also showed severe mitral stenosis  -ROBB done  -Cath: done  -follow up with CT surgery as an outpatient for TAVR    CAD  Continue beta-blocker statin aspirin    History Allergy reaction to Dye  Allergy prep before procedure with : Prednisone and Benadryl: done    Acute hypoxic respiratory failure   -resolve  Saturating above 95% on room air  - likely secondary to pulmonary edema related to acute combined systolic and diastolic CHF exacerbation trigger after dye allergy reaction        NSTEMI with CAD status post CABG  - troponin peaked at 2 31  Likely NSTEMI type 2 secondary to acute CHF exacerbation  Cardiology will hold off on catheterization until respiratory status is more stable   -appreciate Cardiology recommendations  -continue aspirin 81 mg p o  daily  -continue Lipitor 80 mg p o  daily     AFib with RVR   - status post DC cardioversion in 2016    -continue telemetry monitoring    -continue rate control with metoprolol 50 mg in the morning and 100 mg in the evening  -continue Coumadin 3 mg daily     Acute blood loss anemia   - hemoglobin dropped from 12 2-9 6  Likely secondary to hemoptysis    -follow up on repeat hemoglobin at 3:00 p m  today  -monitor CBC closely     Prolonged QTC   - EKG on  shows normal sinus rhythm with first-degree block, LBBB, QTc 519  -check EKG in the morning  -monitor on telemetry  -hold QTc prolonging medications     Asthma  -continue Flovent  -continue loratadine  -continue albuterol p r n      Type 2 diabetes mellitus   - previous A1c 7 7% in 2017   --> 7 5  - resume home Lantus 30 units b i d  and metformin  Today give 1/2 of morning Lantus (15 U)  - continue sliding scale insulin     LEONARD in CKD   - baseline creatinine appears to be from 0 8 to 0 9     -resolve     Leukocytosis   - resolved  Likely secondary to stress reaction        Hypothyroidism   - TSH on 2017 was 0 763  -continue Synthroid 100 mcg p o  daily     Peripheral vascular disease  -continue atorvastatin 80 mg daily     Hypertension  - currently well controlled   -continue metoprolol  -continue Lasix     Constipation   She would like to try eating prunes with her lunch   -continue magnesium oxide b i d   -will at bisacodyl Senokot docusate p r n      GERD  -continue Protonix 40 mg daily  -continue Tums p r n  Disposition:   STR vs home with supervision with [facility] OT  Continue IV furosemide  Disposition per Cardiology      Subjective:   Patient seen examined bedside  She is walking the halls at this time, does not complain of chest pain or shortness of breath, denies lightheadedness dizziness  She is in good spirits this morning  Vitals: Temp (24hrs), Av 2 °F (36 8 °C), Min:97 8 °F (36 6 °C), Max:98 8 °F (37 1 °C)  Current: Temperature: 98 8 °F (37 1 °C)  Vitals:    18 2305 18 0255 18 0600 18 0725   BP: 119/59 117/58  141/80   BP Location: Left arm Left arm  Left arm   Pulse: 66 66  77   Resp:    Temp: 97 8 °F (36 6 °C) 98 °F (36 7 °C)  98 8 °F (37 1 °C)   TempSrc: Oral Oral  Oral   SpO2: 96% 95%  96%   Weight:   77 kg (169 lb 12 1 oz)    Height:        Body mass index is 29 14 kg/m²        I/O last 24 hours: In: 2193 3 [P O :720;  I V :1473 3]  Out: 1551 [Urine:1550; Stool:1]      Physical Exam: /80 (BP Location: Left arm) Comment: Map 102  Pulse 77   Temp 98 8 °F (37 1 °C) (Oral)   Resp 18   Ht 5' 4" (1 626 m)   Wt 77 kg (169 lb 12 1 oz)   LMP  (LMP Unknown)   SpO2 96%   BMI 29 14 kg/m²     General Appearance:    Alert, cooperative, no distress, appears stated age   Head:    Normocephalic, without obvious abnormality, atraumatic   Eyes:    PERRL, conjunctiva/corneas clear, EOM's intact, fundi     benign, both eyes   Neck:   Supple,no JVD   Lungs:     crackles bilaterally, respirations unlabored    Heart:    iregular rate and rhythm, S1 and S2 normal, no murmur, rub   or gallop   Abdomen:     Soft, non-tender, bowel sounds active all four quadrants,     no masses, no organomegaly   Neurologic:   CNII-XII intact, normal strength, sensation and reflexes     throughout        Invasive Devices     Peripheral Intravenous Line            Peripheral IV 03/19/18 Right Forearm less than 1 day    Peripheral IV 03/19/18 Right Hand less than 1 day                          Labs:   Recent Results (from the past 24 hour(s))   Fingerstick Glucose (POCT)    Collection Time: 03/19/18  3:35 PM   Result Value Ref Range    POC Glucose 264 (H) 65 - 140 mg/dl   Fingerstick Glucose (POCT)    Collection Time: 03/19/18  8:09 PM   Result Value Ref Range    POC Glucose 407 (H) 65 - 140 mg/dl   Fingerstick Glucose (POCT)    Collection Time: 03/19/18  9:11 PM   Result Value Ref Range    POC Glucose 374 (H) 65 - 140 mg/dl   Fingerstick Glucose (POCT)    Collection Time: 03/20/18 12:06 AM   Result Value Ref Range    POC Glucose 291 (H) 65 - 140 mg/dl   CBC    Collection Time: 03/20/18  4:50 AM   Result Value Ref Range    WBC 14 90 (H) 4 31 - 10 16 Thousand/uL    RBC 4 23 3 81 - 5 12 Million/uL    Hemoglobin 9 0 (L) 11 5 - 15 4 g/dL    Hematocrit 29 5 (L) 34 8 - 46 1 %    MCV 70 (L) 82 - 98 fL    MCH 21 3 (L) 26 8 - 34 3 pg    MCHC 30 5 (L) 31 4 - 37 4 g/dL    RDW 17 7 (H) 11 6 - 15 1 %    Platelets 995 675 - 271 Thousands/uL    MPV 10 5 8 9 - 12 7 fL   Basic metabolic panel    Collection Time: 03/20/18  4:50 AM   Result Value Ref Range    Sodium 141 136 - 145 mmol/L    Potassium 3 9 3 5 - 5 3 mmol/L    Chloride 109 (H) 100 - 108 mmol/L    CO2 25 21 - 32 mmol/L    Anion Gap 7 4 - 13 mmol/L    BUN 27 (H) 5 - 25 mg/dL    Creatinine 0 81 0 60 - 1 30 mg/dL    Glucose 190 (H) 65 - 140 mg/dL    Calcium 7 4 (L) 8 3 - 10 1 mg/dL    eGFR 69 ml/min/1 73sq m   Protime-INR    Collection Time: 03/20/18  4:50 AM   Result Value Ref Range    Protime 17 2 (H) 12 1 - 14 4 seconds    INR 1 40 (H) 0 86 - 1 16   Fingerstick Glucose (POCT)    Collection Time: 03/20/18  6:05 AM   Result Value Ref Range    POC Glucose 175 (H) 65 - 140 mg/dl       Radiology Results: I have personally reviewed pertinent reports  Other Diagnostic Testing:   I have personally reviewed pertinent reports          Active Meds:   Current Facility-Administered Medications   Medication Dose Route Frequency    acetaminophen (TYLENOL) tablet 650 mg  650 mg Oral Q6H PRN    albuterol (PROVENTIL HFA,VENTOLIN HFA) inhaler 2 puff  2 puff Inhalation Q4H PRN    aspirin chewable tablet 81 mg  81 mg Oral Daily    atorvastatin (LIPITOR) tablet 80 mg  80 mg Oral Daily With Dinner    benzonatate (TESSALON PERLES) capsule 100 mg  100 mg Oral TID PRN    bisacodyl (DULCOLAX) EC tablet 5 mg  5 mg Oral Daily PRN    calcium carbonate (TUMS) chewable tablet 500 mg  500 mg Oral PRN    chlorhexidine (PERIDEX) 0 12 % oral rinse 15 mL  15 mL Swish & Spit Q12H [facility]    cholecalciferol (VITAMIN D3) tablet 1,000 Units  1,000 Units Oral Daily    fluticasone (FLOVENT HFA) 110 MCG/ACT inhaler 1 puff  1 puff Inhalation Q12H [facility]    furosemide (LASIX) injection 40 mg  40 mg Intravenous Daily    insulin glargine (LANTUS) subcutaneous injection 30 Units  30 Units Subcutaneous Q12H LANCE    insulin lispro (HumaLOG) 100 units/mL subcutaneous injection 2-12 Units  2-12 Units Subcutaneous TID AC    insulin lispro (HumaLOG) 100 units/mL subcutaneous injection 2-12 Units  2-12 Units Subcutaneous HS    levothyroxine tablet 100 mcg  100 mcg Oral Early Morning    loratadine (CLARITIN) tablet 10 mg  10 mg Oral Daily    magnesium oxide (MAG-OX) tablet 400 mg  400 mg Oral BID    metoprolol succinate (TOPROL-XL) 24 hr tablet 100 mg  100 mg Oral HS    metoprolol succinate (TOPROL-XL) 24 hr tablet 50 mg  50 mg Oral Daily With Breakfast    pantoprazole (PROTONIX) EC tablet 40 mg  40 mg Oral Early Morning    potassium chloride (K-DUR,KLOR-CON) CR tablet 10 mEq  10 mEq Oral BID    senna-docusate sodium (SENOKOT S) 8 6-50 mg per tablet 1 tablet  1 tablet Oral HS PRN    sodium chloride 0 9 % infusion  100 mL/hr Intravenous Continuous    warfarin (COUMADIN) tablet 3 mg  3 mg Oral Daily (warfarin)         VTE Pharmacologic Prophylaxis: Warfarin (Coumadin)  VTE Mechanical Prophylaxis: sequential compression device    Franck Hernandez DO

## 2018-03-20 NOTE — DISCHARGE SUMMARY
[facility] CENTRAL Discharge Summary - Medical David Rojo 76 y o  female MRN: [id number]    [address] [facility]  Room / Bed: [address] [place] [address] [id number]/PPHP [id number] Encounter: [id number]    BRIEF OVERVIEW    Admitting Provider: Karyle Horns, DO  Discharge Provider: Jovita Tavares MD  Primary Care Physician at Discharge: Jovita Tavares MD    Discharge To: Home      Admission Date: 3/15/2018     Discharge Date: 3/20/2018  1:03 PM    Primary Discharge Diagnosis  Principal Problem:    Critical aortic valve stenosis  Active Problems:    PAD (peripheral artery disease) (HCC)    Essential hypertension    CAD (coronary artery disease)    DM (diabetes mellitus) type II uncontrolled, periph vascular disorder (HCC)    Atrial flutter (HCC)    Hyperlipidemia    Gastroesophageal reflux disease without esophagitis    Moderate mitral stenosis    Acute cardiogenic pulmonary edema ([facility])    Asthma    Atrial fibrillation with rapid ventricular response ([address] )  Resolved Problems:    Acute combined systolic and diastolic congestive heart failure ([address] )    Acute respiratory failure with hypoxia [facility])    Consulting Providers   Bria Mckeon MD Cardiology  Geoff Trujillo DO Cardiac Surgery         Therapeutic Operative Procedures Performed  none    Diagnostic Procedures Performed  ROBB  Systolic function was moderately reduced  Ejection fraction was estimated to be 35 %  Cath  There is significant triple vessel coronary artery disease  Patent LIMA-LAD, patent SVG D1-2-3/sequential(mild degeneration)  occluded(OLD) SVG om1 and SVG PDA  RECOMMENDATIONS: The patient should continue with the present medications  Xr Chest 1 View Portable    Result Date: 3/15/2018  Impression: Severe congestive heart failure bilaterally, worse compared to the prior exam  Findings concur with the preliminary ER interpretation   Workstation performed: [id number]     Xr Chest Portable Icu    Result Date: 3/16/2018  Impression: Significantly improved congestive changes  Workstation performed: [id number]     Cta Chest Abdomen Pelvis W Wo Contrast Tavr    Result Date: 3/16/2018  Impression: 1  TAVR measurements:   Annulus: diameter 31 4 x 20 0 mm     area: 482 2 sq mm   Annulus to LCA: 14 4 mm   Annulus to RCA: 12 9 mm   Minimal diameter right iliofemoral segment: 5 1 mm   Minimal diameter left iliofemoral segment: 6 6 mm 2   50% relatively focal proximal right common iliac stenosis with luminal narrowing to 5 mm  3   Diffuse bilateral groundglass opacities and patchy upper lobe airspace opacities likely on the basis of edema 4  Ectatic ascending aorta measuring 37 mm 5   40% nonostial SMA stenosis Workstation performed: [id number]       Discharge Disposition: Home with [address] [place] [address] Way  Discharged With Lines: no    Test Results Pending at Discharge: none   BMP to be done in 1 week    Outpatient Follow-Up  yes, with primary care physician within 1 to 2 weeks after discharge  Follow up with consulting providers  Cardiac surgery and Cardiology   Active Issues Requiring Follow-up   TAVR to be scheduled    Code Status: Level 1 - Full Code  Advance Directive and Living Will: <no information>  Power of :    POLST:      Medications   Scheduled Meds:    Current Facility-Administered Medications:  acetaminophen 650 mg Oral Q6H PRN    albuterol 2 puff Inhalation Q4H PRN    aspirin 81 mg Oral Daily    atorvastatin 80 mg Oral Daily With Dinner    benzonatate 100 mg Oral TID PRN    bisacodyl 5 mg Oral Daily PRN    calcium carbonate 500 mg Oral PRN    cholecalciferol 1,000 Units Oral Daily    fluticasone 1 puff Inhalation Q12H LANCE    furosemide 40 mg Oral BID (diuretic)    insulin glargine 30 Units Subcutaneous Q12H [address]    levothyroxine 100 mcg Oral Early Morning    lisinopril 5 mg Oral Daily    loratadine 10 mg Oral Daily    magnesium oxide 400 mg Oral BID    metoprolol succinate 100 mg Oral HS    metoprolol succinate 50 mg Oral Daily With Breakfast pantoprazole 40 mg Oral Early Morning    potassium chloride 10 mEq Oral BID    senna-docusate sodium 1 tablet Oral HS PRN    warfarin 3 mg Oral Daily (warfarin)      Continuous Infusions:   PRN Meds:   acetaminophen    albuterol    benzonatate    bisacodyl    calcium carbonate    senna-docusate sodium    Allergies  Allergies   Allergen Reactions    Other Chest Pain     IVP-listed as "chest pain" in previous chart, patient stated this occurred "a long time ago" but does not remember exactly occurred     Cephalosporins Chest Pain    Contrast [Iodinated Diagnostic Agents] Other (See Comments)     Flash pulm edema    Doxycycline Chest Pain    Levaquin [Levofloxacin] Chest Pain    Ondansetron Chest Pain     Prolonged QT    Toradol [Ketorolac Tromethamine] Chest Pain     Discharge Diet: cardiac diet  Activity restrictions: As tolerated    [address]  68-year-old female with past medical history of CAD status post CABG x6 in 2004, baseline LBBB, hypertension, hyperlipidemia, diabetes, known severe aortic stenosis diagnosed in 2016, moderate MS, atrial flutter status post DC cardioversion in 2016, peripheral arterial disease, bilateral carotid stenosis, who presented for outpatient CTA per TAVR protocol  Patient has a known contrast allergy and had preoperative medications for this  After CTA was complete patient went into acute hypoxic respiratory failure and a medical emergency was called  Patient was transferred to the MICU and placed on BiPAP  Chest x-ray revealed acute congestive heart failure and treatment was approached with kevin  She had a non STEMI type 2 related to this acute heart failure exacerbation with troponins peaking at 2 31  She had acute hypoxic respiratory failure requiring up to  5 L nasal cannula  At some point, after diuresis was done, the patient was saturating above 90% on room air    Cardiology felt that she was good enough to go for cardiac catheterization and transesophageal echocardiogram and complete workup for TAVR procedure  Her catheterization show worsening CAD although she did not require any stents, ROBB show worsening of her ejection fraction 37%  Patient still has to be scheduled for TAVR as an outpatient  She was deemed candidate to be discharge, her furosemide was increased the 40 mg twice daily with a BMP to be done in a week  Other Problems Addressed:    Acute on chronic diastolic CHF   - patient had previously had preserved ejection fraction of 60%   TTE on 03/15 shows EF of 35% with aortic valve area of 0 4 cm, severe mitral stenosis and severe pulmonary hypertension  -pro-BNP > 12,000 on admission  Patient with pulmonary edema with persistent active crackles on exam   -transition to Lasix 40 mg BID p o  per cardiology  -tolerated well beta-blocker  ACEI started  -kidney function at baseline     Aortic stenosis   - TTE on 03/15 shows aortic valve area of 0 4 cm   In addition TTE also showed severe mitral stenosis     -ROBB done  -Cath: done  -follow up with CT surgery as an outpatient for TAVR     CAD  Continue beta-blocker statin aspirin     History Allergy reaction to Dye  Allergy prep before procedure with : Prednisone and Benadryl: done     Acute hypoxic respiratory failure   -resolve    Saturating above 95% on room air  - likely secondary to pulmonary edema related to acute combined systolic and diastolic CHF exacerbation trigger after dye allergy reaction        NSTEMI with CAD status post CABG  - troponin peaked at 2 31   Likely NSTEMI type 2 secondary to acute CHF exacerbation     -continue aspirin 81 mg p o  daily  -continue Lipitor 80 mg p o  daily     AFib with RVR   - status post DC cardioversion in 2016    -continue rate control with metoprolol 50 mg in the morning and 100 mg in the evening  -continue Coumadin 3 mg daily     Acute blood loss anemia   - hemoglobin dropped from 12 2-9 6     Prolonged QTC   - EKG on 03/16 shows normal sinus rhythm with first-degree block, LBBB, KFM 110     Asthma  -continue Flovent  -continue loratadine  -continue albuterol p r n      Type 2 diabetes mellitus   - previous A1c 7 7% in December 2017   --> 7 5  - resume home Lantus 30 units b i d  and metformin     LEONARD in CKD   - baseline creatinine appears to be from 0 8 to 0 9     -resolve     Leukocytosis   - resolved  Likely secondary to stress reaction        Hypothyroidism   - TSH on 12/19/2017 was 0 763  -continue Synthroid 100 mcg p o  daily     Peripheral vascular disease  -continue atorvastatin 80 mg daily      Hypertension  - currently well controlled   -continue metoprolol  -continue Lasix     Constipation   She would like to try eating prunes with her lunch   -continue magnesium oxide b i d   -continue bisacodyl Senokot docusate p r n      GERD  -continue Protonix 40 mg daily  -continue Tums p r n  Other Pertinent Test Results  None     Discharge Condition: stable      Discharge  Statement   I spent 45 minutes minutes discharging the patient  This time was spent on the day of discharge  I had direct contact with the patient on the day of discharge  Additional documentation is required if more than 30 minutes were spent on discharge

## 2018-03-20 NOTE — SOCIAL WORK
Pt is cleared for d/c  Pt is accepted for services by [facility] for her aftercare  The pt and her son Noe Jensen were both informed of d/c  Son will transport pt home later this day, pickup time TBD  IMM signed  No chart copy required  CM to follow

## 2018-03-20 NOTE — PLAN OF CARE
CARDIOVASCULAR - ADULT     Maintains optimal cardiac output and hemodynamic stability Progressing     Absence of cardiac dysrhythmias or at baseline rhythm Progressing        DISCHARGE PLANNING - CARE MANAGEMENT     Discharge to post-acute care or home with appropriate resources Progressing        Nutrition/Hydration-ADULT     Nutrient/Hydration intake appropriate for improving, restoring or maintaining nutritional needs Progressing        Potential for Falls     Patient will remain free of falls Progressing        Prexisting or High Potential for Compromised Skin Integrity     Skin integrity is maintained or improved Progressing

## 2018-03-20 NOTE — SOCIAL WORK
Pt is recommended to have in-home PT and in-home OT via [facility] for her aftercare  CM met w/ pt to discuss recommendation  Pt is agreeable  CM provided pt w/ freedom of choice  Pt requested referral to [facility]  CM made Ecin referral to [facility]  CM to follow

## 2018-03-20 NOTE — DISCHARGE INSTRUCTIONS
Please check daily weights  If you gain more than 3 lbs in one day or 5 lbs in one week, please call the Heart Failure Hotline at [phone] so we can adjust your medications if needed  Please limit your salt intake to less than 2000 mg a day  Please limit your fluid intake to a total of less than 1500 mL a day

## 2018-03-23 ENCOUNTER — ANTICOAG VISIT (OUTPATIENT)
Dept: INTERNAL MEDICINE CLINIC | Age: 78
End: 2018-03-23

## 2018-03-23 ENCOUNTER — CLINICAL SUPPORT (OUTPATIENT)
Dept: INTERNAL MEDICINE CLINIC | Facility: CLINIC | Age: 78
End: 2018-03-23
Payer: MEDICARE

## 2018-03-23 DIAGNOSIS — I35.0 SEVERE AORTIC STENOSIS: Primary | ICD-10-CM

## 2018-03-23 DIAGNOSIS — I48.92 ATRIAL FLUTTER, UNSPECIFIED TYPE (HCC): Primary | ICD-10-CM

## 2018-03-23 LAB
INR PPP: 1.34 (ref 0.86–1.16)
INR PPP: 1.34 (ref 0.86–1.16)
PROTHROMBIN TIME: 16.7 SECONDS (ref 12.1–14.4)

## 2018-03-23 PROCEDURE — 36415 COLL VENOUS BLD VENIPUNCTURE: CPT

## 2018-03-23 PROCEDURE — 85610 PROTHROMBIN TIME: CPT | Performed by: INTERNAL MEDICINE

## 2018-03-26 ENCOUNTER — OFFICE VISIT (OUTPATIENT)
Dept: VASCULAR SURGERY | Facility: CLINIC | Age: 78
End: 2018-03-26
Payer: MEDICARE

## 2018-03-26 VITALS
HEART RATE: 88 BPM | DIASTOLIC BLOOD PRESSURE: 72 MMHG | HEIGHT: 64 IN | SYSTOLIC BLOOD PRESSURE: 122 MMHG | RESPIRATION RATE: 22 BRPM | TEMPERATURE: 98.8 F | BODY MASS INDEX: 28.34 KG/M2 | WEIGHT: 166 LBS

## 2018-03-26 DIAGNOSIS — I73.9 PAD (PERIPHERAL ARTERY DISEASE) (HCC): ICD-10-CM

## 2018-03-26 DIAGNOSIS — L97.519 ULCER OF TOE OF RIGHT FOOT, UNSPECIFIED ULCER STAGE (HCC): ICD-10-CM

## 2018-03-26 DIAGNOSIS — I65.23 ASYMPTOMATIC CAROTID ARTERY STENOSIS, BILATERAL: ICD-10-CM

## 2018-03-26 DIAGNOSIS — I70.299 ATHEROSCLEROSIS OF ARTERY OF EXTREMITY WITH ULCERATION (HCC): Primary | ICD-10-CM

## 2018-03-26 DIAGNOSIS — L97.909 ATHEROSCLEROSIS OF ARTERY OF EXTREMITY WITH ULCERATION (HCC): Primary | ICD-10-CM

## 2018-03-26 DIAGNOSIS — L97.529 ULCER OF TOE OF LEFT FOOT, UNSPECIFIED ULCER STAGE (HCC): ICD-10-CM

## 2018-03-26 PROBLEM — E11.8 TYPE 2 DIABETES MELLITUS WITH COMPLICATION (HCC): Status: ACTIVE | Noted: 2017-12-19

## 2018-03-26 PROBLEM — Z95.1 HX OF CABG: Status: ACTIVE | Noted: 2018-01-15

## 2018-03-26 PROBLEM — M15.9 DEGENERATIVE JOINT DISEASE INVOLVING MULTIPLE JOINTS: Status: ACTIVE | Noted: 2017-07-28

## 2018-03-26 PROBLEM — I48.19 PERSISTENT ATRIAL FIBRILLATION (HCC): Status: ACTIVE | Noted: 2018-01-15

## 2018-03-26 PROBLEM — I50.32 CHRONIC DIASTOLIC CONGESTIVE HEART FAILURE (HCC): Status: ACTIVE | Noted: 2018-01-15

## 2018-03-26 PROBLEM — G43.909 MIGRAINE HEADACHE: Status: ACTIVE | Noted: 2017-10-18

## 2018-03-26 PROCEDURE — 99213 OFFICE O/P EST LOW 20 MIN: CPT | Performed by: SURGERY

## 2018-03-26 NOTE — PROGRESS NOTES
Assessment/Plan:    Patient is a 75 yo F w/ HTN, HLD, diastolic and systolic CHF, aflutter on coumadin, CAD, severe aortic stenosis, DM, OA, arthritis, DJD, GERD, hypothyroid, migraine, PAD w/ B nonhealing toe wounds    Atherosclerosis of artery of extremity with ulceration (HCC)  PAD (peripheral artery disease) (HCC)  Ulcer of toe of left foot, unspecified ulcer stage (HCC)  Ulcer of toe of right foot, unspecified ulcer stage (HCC)  -     VAS lower limb arterial duplex, complete bilateral; Future  -L great toe wound since at least Aug (not sure when it started) and R 1st, 3rd, and 5th toes  reviewed LEADs which shows diffuse disease without focal stenosis and R: NC/48/27 and L: NC/52/50   -reviewed angiogram from 12/19/17 by me, which was diagnostic; severe tibial disease without any bypass targets or reconstitution to attempt revasc   -at this point, patient does not have pain and wounds are small/dry/noninfected; continue local wound care with betadine paint and f/u with podiatry; does not have any further options for L leg; will let patient complete TAVR and recovery  -reevaluate in 6 mos with repeat LEADs and will consider RLE angiogram at that time    note: dye allergy (pt unsure of initial reaction; had flash pulm edema after dye for CTA and recent hospitalization; tolerated my angiogram and more recent cardiac cath with dye prep and no issues    Asymptomatic carotid artery stenosis, bilateral  -     VAS carotid complete study; Future  -reviewed carotid DU which shows B ICA <50% stenosis  -will continue to monitor with yearly DU      Medications  -cont ASA/statin; coumadin for aflutter per cards         Patient ID: Jonas Reed is a [de-identified] y o  female  Chief Complaint: Pt is here to review LISBETH done 2/27/18  Pt states she is having pain to her feet when walking  Pt states she can only walk a block  Pt states she stops for 10 minutes  Pt denies numbness or tingling to feet  Pt denies claudication   Pt denies resting pain  Pt has wound to right foot  Pt states she noticed this on Saturday  Pt has some  Redness to toes  Pt is on aspirin and coumadin managed by PCP  Patient was last seen 2 mos ago after diagnostic angiogram; unclear why she is back today  Patient recently hospitalized for flash pulm edema after contrast load for CTA for preop testing for TAVR  She is now recovered and home  She is being planned for TAVR  Also had neg cardiac cath as part of workup  No new complaints  Gets occasional toe pain  Denies claudication or rest pain  Minimal activity  Can ambulated  No O2  Has same L great toe wound and now new wounds on R foot  Most dry, crusted  New skin abrasion from several days ago after putting on moisturizer  Denies TIA/CVA or sxs        The following portions of the patient's history were reviewed and updated as appropriate: allergies, current medications, past family history, past medical history, past social history, past surgical history and problem list     Review of Systems   Constitutional: Negative  Negative for activity change  HENT: Negative  Eyes: Negative  Respiratory: Positive for shortness of breath (with activity)  Cardiovascular: Negative  Negative for chest pain and leg swelling  Gastrointestinal: Negative  Endocrine: Negative  Genitourinary: Negative  Musculoskeletal: Negative  Negative for gait problem  Skin: Positive for wound  Allergic/Immunologic: Negative  Neurological: Positive for light-headedness and numbness  Hematological: Bruises/bleeds easily  Psychiatric/Behavioral: Negative  Objective:      /72 (BP Location: Right arm, Patient Position: Sitting, Cuff Size: Adult)   Pulse 88   Temp 98 8 °F (37 1 °C) (Tympanic)   Resp 22   Ht 5' 4" (1 626 m)   Wt 75 3 kg (166 lb)   LMP  (LMP Unknown)   BMI 28 49 kg/m²          Physical Exam   Constitutional: She is oriented to person, place, and time   She appears well-developed and well-nourished  HENT:   Head: Normocephalic and atraumatic  Eyes: Conjunctivae are normal    Neck: Normal range of motion  Neck supple  Cardiovascular: Normal rate and regular rhythm  Murmur (systolic strong murmur) heard  Pulses:       Radial pulses are 0 on the right side, and 0 on the left side  Femoral pulses are 2+ on the right side, and 2+ on the left side  Popliteal pulses are 0 on the right side, and 0 on the left side  Dorsalis pedis pulses are 0 on the right side, and 0 on the left side  Posterior tibial pulses are 0 on the right side, and 0 on the left side  No carotid bruit B (does have systolic murmur)   Pulmonary/Chest: Effort normal and breath sounds normal  No respiratory distress  She has no wheezes  Abdominal: Soft  She exhibits no distension  There is no tenderness  There is no rebound  Musculoskeletal: Normal range of motion  She exhibits no edema  Neurological: She is alert and oriented to person, place, and time  Skin: Skin is warm and dry  L medial great toe and R medial great toe, plantar surface of R 3rd toe and lateral aspect of R 5th toe all with ~3mm dry wounds with overlying eschar and no signs of infection  R great toe with ~4mm skin tear, superficial, minimal serous drainage, no evidence of infection   Psychiatric: She has a normal mood and affect  Her behavior is normal    Nursing note and vitals reviewed        Vitals:    03/26/18 1308   BP: 122/72   BP Location: Right arm   Patient Position: Sitting   Cuff Size: Adult   Pulse: 88   Resp: 22   Temp: 98 8 °F (37 1 °C)   TempSrc: Tympanic   Weight: 75 3 kg (166 lb)   Height: 5' 4" (1 626 m)       Patient Active Problem List   Diagnosis    PAD (peripheral artery disease) (HCC)    Essential hypertension    Critical aortic valve stenosis    CAD (coronary artery disease)    Type 2 diabetes mellitus with complication (HCC)    Atrial flutter (HCC)    Hyperlipidemia  Gastroesophageal reflux disease without esophagitis    Moderate mitral stenosis    URI, acute    Arthritis of hand    Acute cardiogenic pulmonary edema (HCC)    Asthma    Atrial fibrillation with rapid ventricular response (HCC)    Severe aortic stenosis    Asymptomatic carotid artery stenosis, bilateral    Atherosclerosis of artery of extremity with ulceration (HCC)    Hx of CABG    Chronic anticoagulation    Chronic diastolic congestive heart failure (HCC)    Chronic systolic heart failure (HCC)    Degenerative joint disease involving multiple joints    Diabetic retinopathy (HCC)    Hypothyroidism    Migraine headache    Nephrolithiasis    Osteoarthritis of both knees    Persistent atrial fibrillation (HCC)    Ulcer of toe of left foot (HCC)    Ulcer of toe of right foot ([address] )       Past Surgical History:   Procedure Laterality Date    APPENDECTOMY      ARTERIOGRAM  12/19/2017    CARDIAC CATHETERIZATION      CHOLECYSTECTOMY      COLONOSCOPY      CORONARY ARTERY BYPASS GRAFT  2004    LUMBAR LAMINECTOMY      THYROIDECTOMY      TOTAL ABDOMINAL HYSTERECTOMY W/ BILATERAL SALPINGOOPHORECTOMY         Family History   Problem Relation Age of Onset    Cancer Mother     Stroke Mother     Cancer Father     Heart disease Father     Breast cancer Sister     Diabetes Daughter      Passed away January 2017        Social History     Social History    Marital status:      Spouse name: N/A    Number of children: N/A    Years of education: N/A     Occupational History    Not on file       Social History Main Topics    Smoking status: Never Smoker    Smokeless tobacco: Never Used    Alcohol use No    Drug use: No    Sexual activity: No     Other Topics Concern    Not on file     Social History Narrative    No narrative on file       Allergies   Allergen Reactions    Other Chest Pain     IVP-listed as "chest pain" in previous chart, patient stated this occurred "a long time ago" but does not remember exactly occurred     Cephalosporins Chest Pain    Contrast [Iodinated Diagnostic Agents] Other (See Comments)     Flash pulm edema    Doxycycline Chest Pain    Levaquin [Levofloxacin] Chest Pain    Ondansetron Chest Pain     Prolonged QT    Toradol [Ketorolac Tromethamine] Chest Pain         Current Outpatient Prescriptions:     albuterol (VENTOLIN HFA) 90 mcg/act inhaler, Inhale 108 mcg 2 (two) times a day as needed 2 puffs bid PRN, Disp: , Rfl:     aspirin 81 MG tablet, Take 81 mg by mouth daily, Disp: , Rfl:     atorvastatin (LIPITOR) 80 mg tablet, Take 80 mg by mouth daily, Disp: , Rfl:     calcium carbonate (TUMS) 500 mg chewable tablet, Chew as needed, Disp: , Rfl:     cholecalciferol (VITAMIN D3) 1,000 units tablet, Take 1,000 mg by mouth daily, Disp: , Rfl:     esomeprazole (NexIUM) 20 mg capsule, 20 mg daily, Disp: , Rfl:     furosemide (LASIX) 40 mg tablet, Take 1 tablet (40 mg total) by mouth 2 (two) times a day, Disp: 60 tablet, Rfl: 0    LANTUS 100 UNIT/ML subcutaneous injection, Inject 30 Units under the skin IN THE MORNING AND 30 UNITS AT BEDTIME, Disp: , Rfl: 3    levothyroxine 100 mcg tablet, Take 100 mcg by mouth daily, Disp: , Rfl:     lisinopril (ZESTRIL) 5 mg tablet, Take 5 mg by mouth daily at bedtime, Disp: , Rfl:     loratadine (CLARITIN) 10 mg tablet, Take 1 tablet (10 mg total) by mouth daily, Disp: 10 tablet, Rfl: 0    Magnesium Oxide 400 MG CAPS, Take 400 mg by mouth 2 (two) times a day, Disp: , Rfl:     metFORMIN (GLUCOPHAGE) 1000 MG tablet, 1,000 mg 2 (two) times a day, Disp: , Rfl:     metoprolol succinate (TOPROL-XL) 100 mg 24 hr tablet, 100 mg daily at bedtime, Disp: , Rfl:     metoprolol succinate (TOPROL-XL) 50 mg 24 hr tablet, Take 1 tablet by mouth daily with breakfast, Disp: , Rfl:     Mometasone Furoate (ASMANEX HFA) 100 MCG/ACT AERO, Inhale 2 puffs once daily, Disp: 1 Inhaler, Rfl: 5    nitroglycerin (NITROSTAT) 0 4 mg SL tablet, Place 0 4 mg under the tongue every 3 (three) minutes as needed, Disp: , Rfl:     potassium chloride (K-DUR,KLOR-CON) 10 mEq tablet, Take 1 tablet (10 mEq total) by mouth 2 (two) times a day, Disp: 60 tablet, Rfl: 0    warfarin (COUMADIN) 3 mg tablet, Take 3 mg by mouth daily 6 mg tuesdays, 3 mg all other days, Disp: , Rfl:

## 2018-03-26 NOTE — PATIENT INSTRUCTIONS
1) PAD  -you have known blood vessel disease in both legs  -we will consider further intervention after you have recovered from your heart valve  -keep your foot wounds clean and dry  -if you have redness, fever, drainage, pus, increased pain, foul smell, please call us immediately as these are signs of infection  -we will repeat ultrasound on your legs in 6 mos and have a followup appointment at that time    2) Carotid disease  -you have mild disease of your neck arteries on both sides and there is no indication for surgery at this time  -we will continue to monitor this with yearly ultrasounds

## 2018-03-28 ENCOUNTER — OFFICE VISIT (OUTPATIENT)
Dept: CARDIOLOGY CLINIC | Facility: CLINIC | Age: 78
End: 2018-03-28
Payer: MEDICARE

## 2018-03-28 ENCOUNTER — OFFICE VISIT (OUTPATIENT)
Dept: INTERNAL MEDICINE CLINIC | Facility: CLINIC | Age: 78
End: 2018-03-28
Payer: MEDICARE

## 2018-03-28 VITALS
OXYGEN SATURATION: 98 % | WEIGHT: 167 LBS | SYSTOLIC BLOOD PRESSURE: 122 MMHG | BODY MASS INDEX: 27.82 KG/M2 | DIASTOLIC BLOOD PRESSURE: 74 MMHG | TEMPERATURE: 98.5 F | HEIGHT: 65 IN | HEART RATE: 70 BPM

## 2018-03-28 VITALS
DIASTOLIC BLOOD PRESSURE: 62 MMHG | BODY MASS INDEX: 28.17 KG/M2 | WEIGHT: 165 LBS | SYSTOLIC BLOOD PRESSURE: 104 MMHG | HEIGHT: 64 IN | OXYGEN SATURATION: 97 % | HEART RATE: 86 BPM

## 2018-03-28 DIAGNOSIS — K21.9 GASTROESOPHAGEAL REFLUX DISEASE WITHOUT ESOPHAGITIS: ICD-10-CM

## 2018-03-28 DIAGNOSIS — Z09 HOSPITAL DISCHARGE FOLLOW-UP: ICD-10-CM

## 2018-03-28 DIAGNOSIS — Z95.1 HX OF CABG: ICD-10-CM

## 2018-03-28 DIAGNOSIS — I35.0 SEVERE AORTIC STENOSIS: Primary | ICD-10-CM

## 2018-03-28 DIAGNOSIS — L97.909 ATHEROSCLEROSIS OF ARTERY OF EXTREMITY WITH ULCERATION (HCC): ICD-10-CM

## 2018-03-28 DIAGNOSIS — Z79.4 TYPE 2 DIABETES MELLITUS WITH COMPLICATION, WITH LONG-TERM CURRENT USE OF INSULIN (HCC): ICD-10-CM

## 2018-03-28 DIAGNOSIS — J06.9 URI, ACUTE: ICD-10-CM

## 2018-03-28 DIAGNOSIS — I05.0 MODERATE MITRAL STENOSIS: ICD-10-CM

## 2018-03-28 DIAGNOSIS — R94.31 PROLONGED Q-T INTERVAL ON ECG: ICD-10-CM

## 2018-03-28 DIAGNOSIS — I70.299 ATHEROSCLEROSIS OF ARTERY OF EXTREMITY WITH ULCERATION (HCC): ICD-10-CM

## 2018-03-28 DIAGNOSIS — I25.10 CORONARY ARTERY DISEASE INVOLVING NATIVE CORONARY ARTERY OF NATIVE HEART WITHOUT ANGINA PECTORIS: ICD-10-CM

## 2018-03-28 DIAGNOSIS — I35.0 SEVERE AORTIC STENOSIS: ICD-10-CM

## 2018-03-28 DIAGNOSIS — E78.2 MIXED HYPERLIPIDEMIA: ICD-10-CM

## 2018-03-28 DIAGNOSIS — E89.0 POSTOPERATIVE HYPOTHYROIDISM: ICD-10-CM

## 2018-03-28 DIAGNOSIS — I48.19 PERSISTENT ATRIAL FIBRILLATION (HCC): ICD-10-CM

## 2018-03-28 DIAGNOSIS — Z79.01 CHRONIC ANTICOAGULATION: ICD-10-CM

## 2018-03-28 DIAGNOSIS — I73.9 PAD (PERIPHERAL ARTERY DISEASE) (HCC): ICD-10-CM

## 2018-03-28 DIAGNOSIS — I10 ESSENTIAL HYPERTENSION: ICD-10-CM

## 2018-03-28 DIAGNOSIS — I50.33 ACUTE ON CHRONIC DIASTOLIC CHF (CONGESTIVE HEART FAILURE) (HCC): Primary | ICD-10-CM

## 2018-03-28 DIAGNOSIS — I50.22 CHRONIC SYSTOLIC HEART FAILURE (HCC): ICD-10-CM

## 2018-03-28 DIAGNOSIS — E11.8 TYPE 2 DIABETES MELLITUS WITH COMPLICATION, WITH LONG-TERM CURRENT USE OF INSULIN (HCC): ICD-10-CM

## 2018-03-28 DIAGNOSIS — N17.9 AKI (ACUTE KIDNEY INJURY) (HCC): ICD-10-CM

## 2018-03-28 PROCEDURE — 99214 OFFICE O/P EST MOD 30 MIN: CPT | Performed by: PHYSICIAN ASSISTANT

## 2018-03-28 PROCEDURE — 99214 OFFICE O/P EST MOD 30 MIN: CPT | Performed by: INTERNAL MEDICINE

## 2018-03-28 RX ORDER — LORATADINE 10 MG/1
10 TABLET ORAL DAILY
Qty: 30 TABLET | Refills: 0 | Status: SHIPPED | OUTPATIENT
Start: 2018-03-28 | End: 2018-06-04 | Stop reason: SDUPTHER

## 2018-03-28 RX ORDER — INSULIN GLARGINE 100 [IU]/ML
30 INJECTION, SOLUTION SUBCUTANEOUS 2 TIMES DAILY
Qty: 10 ML | Refills: 0 | Status: ON HOLD | OUTPATIENT
Start: 2018-03-28 | End: 2018-04-19

## 2018-03-28 NOTE — ASSESSMENT & PLAN NOTE
And patient presents for hospital follow-up  She was admitted to the hospital for an elective CTA with Dr Monsivais  After which she developed a contrast allergy and acute hypoxic respiratory failure she was placed on BiPAP and transition to the ICU  Chest x-ray showed acute CHF  Cardiology was consulted  She developed a non ST elevated MI with troponins elevating to  2 31  She was able to be weaned off of nasal oxygen and is breathing fine on room air  Her diuretics were increased to 40 mg twice daily  Cardiology evaluated her and she underwent a cardiac catheterization and DOMINGO  Cardiac catheterization showed worsening of CAD but did not show that she needed any stenting  Domingo showed worsening of ejection fraction at 37% she has been scheduled for an outpatient TAVR with Dr Davis Soriano on 4/3/18  She had preop clearance with cardiology scheduled for tomorrow

## 2018-03-28 NOTE — PATIENT INSTRUCTIONS
Hospital discharge follow-up  And patient presents for hospital follow-up  She was admitted to the hospital for an elective CTA with Dr Monsivais  After which she developed a contrast allergy and acute hypoxic respiratory failure she was placed on BiPAP and transition to the ICU  Chest x-ray showed acute CHF  Cardiology was consulted  She developed a non ST elevated MI with troponins elevating to  2 31  She was able to be weaned off of nasal oxygen and is breathing fine on room air  Her diuretics were increased to 40 mg twice daily  Cardiology evaluated her and she underwent a cardiac catheterization and DOMINGO  Cardiac catheterization showed worsening of CAD but did not show that she needed any stenting  Domingo showed worsening of ejection fraction at 37% she has been scheduled for an outpatient TAVR with Dr Vance Turk on 4/3/18  She had preop clearance with cardiology scheduled for tomorrow

## 2018-03-28 NOTE — PROGRESS NOTES
Cardiology Follow Up    Marcello Rodriguez  1940  [id number]  [address] [facility]  [address] [place] [address] [address]    1  Severe aortic stenosis     2  Persistent atrial fibrillation ([address] )     3  Moderate mitral stenosis     4  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 native heart without angina pectoris     5  Hx of CABG     6  Mixed hyperlipidemia     7  Chronic systolic heart failure (HCC)         Interval History:     Mr Kraft Langford in for follow up regarding her history of coronary artery disease, status post CABG, congestive heart failure, atrial fibrillation and now severe aortic stenosis  Jason Moura in the hospital a few months ago  She was having an elective angiogram given her peripheral arterial disease  Postprocedure she developed flash pulmonary edema and was admitted for CHF exacerbation  She was persistently in atrial fibrillation and was rapid as well, and she also has a chronic left bundle branch block  She received several doses of IV Lasix, And then was placed back on her home dose of Lasix 40 mg daily  Her last echocardiogram earlier that year showed a normal LV systolic function, severe aortic stenosis and moderate mitral stenosis  She also had moderate to severe LVH  In follow-up she was feeling better  We discussed her valvular heart disease and I referred her to CT surgery for evaluation  Of note, she had been worked up at [facility] for her valvular heart disease but was turned down as a candidate  After seeing CT surgery she started the workup for her TAVR  While doing her workup, in the CT scan she developed acute orthopnea and shortness of breath  She was then admitted  A repeat echo was performed which showed a drop in her ejection fraction from her prior, down to 35%    She stated as an inpatient to have her workup which included a cardiac catheterization and ROBB  Cardiac catheterization demonstrated occluded native coronary anatomy, with 2 of her vein grafts occluded as well as stated below  ROBB confirmed severe aortic stenosis and moderate mitral stenosis  She is now scheduled to have her TAVR  Next week  Her mitral stenosis will be followed and treated medically  Tricia Pang feels better since her hospitalization  She still has shortness of breath with exertion, which is expected  She denies chest pain or any symptoms of angina  She denies lightheadedness, palpitations or any history of syncope  She shows no signs of volume overload  No orthopnea, PND or any other symptoms of CHF           Patient Active Problem List   Diagnosis    [facility]AD [facility]peripheral artery disease) [facility]    Essential hypertension    Critical aortic valve stenosis    CAD (coronary artery disease)    Type 2 diabetes mellitus with complication (HCC)    Atrial flutter (HCC)    Hyperlipidemia    Gastroesophageal reflux disease without esophagitis    Moderate mitral stenosis    URI, acute    Arthritis of hand    Acute cardiogenic pulmonary edema (HCC)    Asthma    Atrial fibrillation with rapid ventricular response (HCC)    Severe aortic stenosis    Asymptomatic carotid artery stenosis, bilateral    Atherosclerosis of artery of extremity with ulceration (HCC)    Hx of CABG    Chronic anticoagulation    Chronic diastolic congestive heart failure (HCC)    Chronic systolic heart failure (HCC)    Degenerative joint disease involving multiple joints    Diabetic retinopathy (HCC)    Hypothyroidism    Migraine headache    Nephrolithiasis    Osteoarthritis of both knees    Persistent atrial fibrillation (HCC)    Ulcer of toe of left foot (HCC)    Ulcer of toe of right foot ([facility] )     Past Medical History:   Diagnosis Date    Altered mental status     Anticoagulated     Coumadin for Aflutter    Asthma     [facility] fl[facility]     maintained on coumadin anticoag    CAD (coronary artery disease)     Candidiasis     Carotid stenosis, bilateral     Cataract     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) ([facility]     Chronic fatigue syndrome     Chronic low back pain     Concussion w loss of consciousness of unsp duration, init     Coronary artery disease     Diabetes mellitus ([facility]     type 2, insulin dependent    DJD (degenerative joint disease)     GERD (gastroesophageal reflux disease)     Herpes zoster     HLD (hyperlipidemia)     HTN (hypertension)     Hyperlipidemia     Hypothyroidism     Irritable bowel syndrome     Lumbar radiculopathy     Lyme disease     Dx in hospital 8/2011    Lyme disease     MI (myocardial infarction)     Migraines     Muscle spasm     Non-neoplastic nevus     Nontoxic multinodular goiter     OA (osteoarthritis)     Osteoarthritis     Other chronic pain     PAD (peripheral artery disease) ([facility]     Palpitations     Pseudogout     Spinal stenosis     Transient cerebral ischemia     Trigger ring finger     Viral gastroenteritis      Social History     Social History    Marital status:      Spouse name: N/A    Number of children: N/A    Years of education: N/A     Occupational History    Not on file       Social History Main Topics    Smoking status: Never Smoker    Smokeless tobacco: Never Used    Alcohol use No    Drug use: No    Sexual activity: No     Other Topics Concern    Not on file     Social History Narrative    No narrative on file      Family History   Problem Relation Age of Onset    Cancer Mother     Stroke Mother     Cancer Father     Heart disease Father     Breast cancer Sister     Diabetes Daughter      Passed away January 2017      Past Surgical History:   Procedure Laterality Date    APPENDECTOMY      ARTERIOGRAM  12/19/2017    CARDIAC CATHETERIZATION      CHOLECYSTECTOMY      COLONOSCOPY      CORONARY ARTERY BYPASS GRAFT  2004    LUMBAR LAMINECTOMY      THYROIDECTOMY      TOTAL ABDOMINAL HYSTERECTOMY W/ BILATERAL SALPINGOOPHORECTOMY         Current Outpatient Prescriptions:     albuterol (VENTOLIN HFA) 90 mcg/act inhaler, Inhale 108 mcg 2 (two) times a day as needed 2 puffs bid PRN, Disp: , Rfl:     aspirin 81 MG tablet, Take 81 mg by mouth daily, Disp: , Rfl:     atorvastatin (LIPITOR) 80 mg tablet, Take 80 mg by mouth daily, Disp: , Rfl:     calcium carbonate (TUMS) 500 mg chewable tablet, Chew as needed, Disp: , Rfl:     cholecalciferol (VITAMIN D3) 1,000 units tablet, Take 1,000 mg by mouth daily, Disp: , Rfl:     esomeprazole (NexIUM) 20 mg capsule, 20 mg daily, Disp: , Rfl:     furosemide (LASIX) 40 mg tablet, Take 1 tablet (40 mg total) by mouth 2 (two) times a day, Disp: 60 tablet, Rfl: 0    LANTUS 100 UNIT/ML subcutaneous injection, Inject 30 Units under the skin IN THE MORNING AND 30 UNITS AT BEDTIME, Disp: , Rfl: 3    levothyroxine 100 mcg tablet, Take 100 mcg by mouth daily, Disp: , Rfl:     lisinopril (ZESTRIL) 5 mg tablet, Take 5 mg by mouth daily at bedtime, Disp: , Rfl:     loratadine (CLARITIN) 10 mg tablet, Take 1 tablet (10 mg total) by mouth daily, Disp: 10 tablet, Rfl: 0    Magnesium Oxide 400 MG CAPS, Take 400 mg by mouth 2 (two) times a day, Disp: , Rfl:     metFORMIN (GLUCOPHAGE) 1000 MG tablet, 1,000 mg 2 (two) times a day, Disp: , Rfl:     metoprolol succinate (TOPROL-XL) 100 mg 24 hr tablet, 100 mg daily at bedtime, Disp: , Rfl:     metoprolol succinate (TOPROL-XL) 50 mg 24 hr tablet, Take 1 tablet by mouth daily with breakfast, Disp: , Rfl:     Mometasone Furoate (ASMANEX HFA) 100 MCG/ACT AERO, Inhale 2 puffs once daily, Disp: 1 Inhaler, Rfl: 5    nitroglycerin (NITROSTAT) 0 4 mg SL tablet, Place 0 4 mg under the tongue every 3 (three) minutes as needed, Disp: , Rfl:     potassium chloride (K-DUR,KLOR-CON) 10 mEq tablet, Take 1 tablet (10 mEq total) by mouth 2 (two) times a day, Disp: 60 tablet, Rfl: 0    warfarin (COUMADIN) 3 mg tablet, Take 3 mg by mouth daily 6 mg tuesdays, 3 mg all other days, Disp: , Rfl:   Allergies   Allergen Reactions    Other Chest Pain     IVP-listed as "chest pain" in previous chart, patient stated this occurred "a long time ago" but does not remember exactly occurred     Cephalosporins Chest Pain    Contrast [Iodinated Diagnostic Agents] Other (See Comments)     Flash pulm edema    Doxycycline Chest Pain    Levaquin [Levofloxacin] Chest Pain    Ondansetron Chest Pain     Prolonged QT    Toradol [Ketorolac Tromethamine] Chest Pain       Labs:  Lab Results   Component Value Date     03/20/2018     10/14/2015    K 3 9 03/20/2018    K 4 6 10/14/2015     (H) 03/20/2018     10/14/2015    CO2 25 03/20/2018    CO2 28 10/14/2015    BUN 27 (H) 03/20/2018    BUN 18 10/14/2015    CREATININE 0 81 03/20/2018    CREATININE 0 70 10/14/2015    GLUCOSE 190 (H) 03/20/2018    GLUCOSE 317 (H) 03/15/2018    GLUCOSE 119 10/14/2015    CALCIUM 7 4 (L) 03/20/2018    CALCIUM 8 8 10/14/2015     Lab Results   Component Value Date    WBC 14 90 (H) 03/20/2018    WBC 9 01 10/14/2015    HGB 9 0 (L) 03/20/2018    HGB 12 5 10/14/2015    HCT 29 5 (L) 03/20/2018    HCT 40 3 10/14/2015    MCV 70 (L) 03/20/2018    MCV 74 (L) 10/14/2015     03/20/2018     10/14/2015     Imaging:     CARDIAC CATH:  SUMMARY     CORONARY CIRCULATION:  Mid LAD: There was a 90 % stenosis  1st diagonal: There was a 100 % stenosis at the ostium of the vessel segment  2nd diagonal: There was a 100 % stenosis at the ostium of the vessel segment  3rd diagonal: There was a 100 % stenosis at the ostium of the vessel segment  1st obtuse marginal: There was a 100 % stenosis at the ostium of the vessel segment  Proximal RCA: There was a 60 % stenosis  The lesion was hazy and ulcerated  It appears amenable to percutaneous intervention  Right PDA: There was a 50 % stenosis    Graft to the 1st diagonal: There was a discrete 50 % stenosis at the proximal anastomosis  It appears amenable to percutaneous intervention  Graft to the 1st obtuse marginal: There was a 100 % stenosis at the proximal anastomosis  Graft to the RPDA: There was a 100 % stenosis at the proximal anastomosis  ROBB:  LEFT VENTRICLE:  Systolic function was moderately reduced  Ejection fraction was estimated to be 35 %  There was moderate diffuse hypokinesis  Wall thickness was moderately increased  There was moderate concentric hypertrophy      LEFT ATRIUM:  The atrium was markedly dilated      ATRIAL SEPTUM:  No defect or patent foramen ovale was identified by color Doppler      RIGHT ATRIUM:  The atrium was moderately dilated      MITRAL VALVE:  There was marked annular calcification  There was marked diffuse thickening  There was marked calcification of the anterior and posterior leaflets, involving the leaflet base more than the margin  There was moderately reduced leaflet separation  There was moderate stenosis  There was mild to moderate regurgitation  Regurgitation grade was 1-2+ on a scale of 0 to 4+  Mean transmitral gradient was 8 mmHg  The mitral valve area by proximal isovelocity surface area method was 1 42 cm squared  The effective orifice of mitral regurgitation by proximal isovelocity surface area was 0 23 cm squared  The volume of mitral regurgitation by proximal isovelocity surface area was 33 ml      AORTIC VALVE:  The valve was trileaflet  Leaflets exhibited markedly increased thickness, marked calcification, and markedly reduced cuspal separation  There was critical stenosis  LVOT diameter was measured as 22 mm  The aortic annulus are measured 4 23 cm2  The nasima-posterior and horizontal diameters of aortic annulus measured 2 25 and 2 36 cm respectively  The aortic root measured 30  mm at sinus of valsalva and 27 mm at sino-tubular junction  The proximal ascending aorta measured 35 mm    There was mild regurgitation  Valve mean gradient was 30 mmHg  Estimated aortic valve area (by VTI) was 0 46 cm squared  Estimated aortic valve area (by Vmax) was 0 57 cm squared      TRICUSPID VALVE:  There was mild regurgitation  Estimated peak PA pressure was 60 mmHg  The findings suggest moderate pulmonary hypertension  Xr Chest 1 View Portable    Result Date: 3/15/2018  Narrative: CHEST INDICATION:   Respiratory distress, critical AS  COMPARISON:  Chest x-ray from 12/19/2017 EXAM PERFORMED/VIEWS:  XR CHEST PORTABLE FINDINGS:  There has been prior sternotomy and CABG  There is stable cardiomegaly  There is severe congestive heart failure bilaterally, worse compared to the prior exam   There may be a small left pleural effusion  Osseous structures appear within normal limits for patient age  Impression: Severe congestive heart failure bilaterally, worse compared to the prior exam  Findings concur with the preliminary ER interpretation  Workstation performed: [id number]     Vas Carotid Complete Study    Result Date: 3/16/2018  Narrative:  [facility] REPORT CLINICAL: Indications:  Yearly surveillance of carotid artery disease  Patient is asymptomatic at this time  Patient presents for a general health evaluation secondary to future open heart surgery  Patient is asymptomatic at this time  Operative History CABG lower extremity a-gram 12/2017 Risk Factors The patient has history of HTN, Diabetes (Yes), hyperlipidemia and PAD  Clinical Right Pressure:  133/ mm Hg, Left Pressure:  124/ mm Hg  FINDINGS:  Right        Impression  PSV  EDV (cm/s)  Ratio  Dist  ICA                 42          16   0 99  Mid  ICA                  58          21   1 36  Prox   ICA    1 - 49%      52          18   1 22  Dist CCA                  48          13         Mid CCA                   42          12   0 70  Prox CCA                  61          16         Ext Carotid               82           6   1 94  Prox Vert 42          19         Subclavian                85           9          Left         Impression  PSV  EDV (cm/s)  Ratio  Dist  ICA                 61          22   0 94  Mid  ICA                  76          28   1 18  Prox  ICA    1 - 49%      61          23   0 94  Dist CCA                  46          15         Mid CCA                   65          22   0 98  Prox CCA                  66          21         Ext Carotid               89           0   1 38  Prox Vert                 45          16         Subclavian               111           0            CONCLUSION: Impression RIGHT: There is <50% stenosis noted in the internal carotid artery  Plaque is heterogenous and irregular  Vertebral artery flow is antegrade  There is no significant subclavian artery disease  LEFT: There is <50% stenosis noted in the internal carotid artery  Plaque is heterogenous and irregular  Vertebral artery flow is antegrade  There is no significant subclavian artery disease  Compared to previous study on 09/22/2017 , there is no change  Internal carotid artery stenosis determination by consensus criteria from: dallas Fair al  Carotid Artery Stenosis: Gray-Scale and Doppler US Diagnosis - Society of Radiologists in [facility] [address], Radiology 2003; [id number]  SIGNATURE: Electronically Signed by: Jen Saxena MD, RPVI on 2018-03-16 06:38:20 PM    Vas Lower Limb Arterial Duplex, Complete Bilateral    Result Date: 2/27/2018  Narrative:  [facility] REPORT CLINICAL: Indications:  PVD, Unspecified [I73 9]  Patient presents with wounds to right foot and b/l foot pain  Risk Factors: The patient has history of Hypertension, obesity, Hyperlipidemia and Diabetes  Operative History lower extremity a-gram 12/2017 CABG Right Brachial Pressure:  133/ mmHg, Left Brachial Pressure:  124/ mmHg     FINDINGS:  Segment                Rig  Left                                          PSV  PSV  Common Femoral Artery   53   63  Prox Profunda           41   33  Prox SFA                38   39  Mid SFA                 70   64  Dist SFA                67   84  Proximal Pop            23   24  Distal Pop              30   66  Dist Post Tibial        21   36  Dist  Ant  Tibial       54   82     CONCLUSION: Impression: RIGHT LOWER LIMB: Common femoral waveforms are dampened consistent with inflow aortoiliac occlusive disease  Evaluation shows diffuse atherosclerotic disease throughout the femoro-popliteal and tibio-peroneal segments without focal stenosis  Ankle/Brachial index: Non-compressible  Prior: same finding  PVR/ PPG tracings are dampened  Metatarsal pressure of 48 mmHg Great toe pressure of 27 mmHg, below the healing range  Prior: 33 mmHg LEFT LOWER LIMB: Evaluation shows diffuse atherosclerotic disease throughout the femoro-popliteal and tibio-peroneal segments Without focal stenosis  Ankle/Brachial index: 0 83, which is moderate claudication range  Prior: non-compressible  PVR/ PPG tracings are dampened  Metatarsal pressure of 52 mmHg Great toe pressure of 50 mmHg, within the healing range  Prior: 34 mmHg  Compared to previous study on 9/22/17, there is no significant change  SIGNATURE: Electronically Signed by: Carlos Eduardo Mckeon on 2018-02-27 05:18:15 PM    Vas Lower Limb Arterial Duplex, Limited, Unilateral    Result Date: 3/19/2018  Narrative:  [facility] REPORT CLINICAL: Indications:  Physician concerned with weakened pulses at right ankle  Risk Factors: The patient has history of Diabetes, Hyperlipidemia, obesity and Hypertension  Operative History CABG lower extremity a-gram 12/2017 Left Brachial Pressure:  128/ mmHg  FINDINGS:  Right                  PSV  Common Femoral Artery   43  Prox Profunda           23  Prox SFA                44  Mid SFA                 48  Dist SFA                72  Proximal Pop            26  Distal Pop              33  Dist Post Tibial        14  Dist  Ant   Tibial 30     CONCLUSION: Impression: RIGHT LOWER LIMB: Evaluation shows diffuse atherosclerotic disease throughout the femoro-popliteal and tibio-peroneal segments  Distal anterior tibial artery poorly insonated  Ankle/Brachial index: Non-compressible  Prior: same finding  PVR/ PPG tracings are dampened  Metatarsal pressure of 53 mmHg Great toe pressure of 25 mmHg, below the healing range  Prior: 27 mmHg  Compared to previous study on 2/27/18, there is no significant change  SIGNATURE: Electronically Signed by: Nano Anthony MD on 2018-03-19 05:37:03 PM    Xr Chest Portable Icu    Result Date: 3/16/2018  Narrative: CHEST INDICATION:   re-evaluation of pulmonary edema, hemoptysis  COMPARISON:  3/15/2018 EXAM PERFORMED/VIEWS:  XR CHEST PORTABLE ICU FINDINGS: Heart shadow is enlarged but unchanged from prior exam  Significantly improved congestive changes  Small pleural effusions  Osseous structures appear within normal limits for patient age  Impression: Significantly improved congestive changes  Workstation performed: [id number]     Cta Chest Abdomen Pelvis W Wo Contrast Tavr    Result Date: 3/16/2018  Narrative: CT ANGIOGRAM OF THE CHEST, ABDOMEN AND PELVIS WITH AND WITHOUT IV CONTRAST INDICATION: Preoperative evaluation for TAVR COMPARISON: May 20, 2015 TECHNIQUE:  CT angiogram examination of the chest, abdomen and pelvis was performed according to TAVR protocol including cardiac gating  Contrast as well as noncontrast images were obtained  120 ml of Visipaque 320 was injected intravenously  3D reconstructions were performed an independent workstation, and are supplied for review  This examination, like all CT scans performed in the [facility], was performed utilizing techniques to minimize radiation dose exposure, including the use of iterative reconstruction and automated exposure control   FINDINGS: VASCULAR STRUCTURES:     Annulus: diameter 31 4 x 20 0 mm     area: 482 2 sq mm   Annulus to LCA: 14 4 mm   Annulus to RCA: 12 9 mm   Minimal diameter right iliofemoral segment: 5 1 mm   Minimal diameter left iliofemoral segment: 6 6 mm Heart size is enlarged  Aortic valve is moderately calcified  There is also mitral valve annular calcifications  There is no pericardial effusion  There is been prior vein grafting to the left coronary  Atretic appearing possibly occluded LIMA graft  is also seen  The ascending aorta is ectatic measuring 37 mm  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  There is no great vessel stenosis proximally  There is moderate calcification of the aortic arch and descending thoracic aorta which are normal in caliber  The abdominal aorta is tortuous but normal in caliber  There is a moderate stenosis of the proximal right common iliac artery with the lumen narrows to 5 mm with comparison measurements of 10 mm of the distal common iliac  Internal iliac arteries are patent bilaterally  There is no significant external or common femoral stenosis  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 with relatively high origin of the medial profunda branch  There is no celiac stenosis  There is a concentric stenosis of the SMA 1 cm from its origin measuring 40% in severity  No significant renal artery stenosis is seen  OTHER FINDINGS: CHEST: LUNGS:  Diffuse ground glass opacity and patchy airspace opacities upper lobe likely related to edema  PLEURA: No pleural effusion  MEDIASTINUM AND MARY:  Mild nonspecific mediastinal and bilateral hilar lymphadenopathy  CHEST WALL AND LOWER NECK: Unremarkable  ABDOMEN LIVER/BILIARY TREE:  Unremarkable  GALLBLADDER:  Absent SPLEEN:  Unremarkable  Normal size  PANCREAS:  Unremarkable  ADRENAL GLANDS:  Unremarkable  KIDNEYS/URETERS:  No solid renal mass  No hydronephrosis  Nonobstructive calculus lower pole right kidney and probable parenchymal calcification  PELVIS REPRODUCTIVE ORGANS:  Unremarkable for patient's age  URINARY BLADDER:  Unremarkable  ADDITIONAL ABDOMINAL AND PELVIC STRUCTURES: STOMACH AND BOWEL:  Unremarkable  ABDOMINOPELVIC CAVITY:  No pathologically enlarged mesenteric or retroperitoneal lymph nodes  No ascites or free intraperitoneal air  ABDOMINAL WALL/INGUINAL REGIONS: Unremarkable  OSSEOUS STRUCTURES:  No acute fracture or destructive osseous lesion  Impression: 1  TAVR measurements:   Annulus: diameter 31 4 x 20 0 mm     area: 482 2 sq mm   Annulus to LCA: 14 4 mm   Annulus to RCA: 12 9 mm   Minimal diameter right iliofemoral segment: 5 1 mm   Minimal diameter left iliofemoral segment: 6 6 mm 2   50% relatively focal proximal right common iliac stenosis with luminal narrowing to 5 mm  3   Diffuse bilateral groundglass opacities and patchy upper lobe airspace opacities likely on the basis of edema 4  Ectatic ascending aorta measuring 37 mm 5   40% nonostial SMA stenosis Workstation performed: [id number]       Review of Systems:  Review of Systems   Constitutional: Positive for fatigue  HENT: Negative  Eyes: Negative  Respiratory: Positive for shortness of breath  Cardiovascular: Negative  Gastrointestinal: Negative  Musculoskeletal: Negative  Skin: Negative  Allergic/Immunologic: Negative  Neurological: Negative  Hematological: Negative  Psychiatric/Behavioral: Negative  All other systems reviewed and are negative  Physical Exam:  Physical Exam   Constitutional: She is oriented to person, place, and time  She appears well-developed and well-nourished  HENT:   Head: Normocephalic and atraumatic  Eyes: EOM are normal  Pupils are equal, round, and reactive to light  Right eye exhibits no discharge  Left eye exhibits no discharge  No scleral icterus  Neck: Normal range of motion  Neck supple  No JVD present  No thyromegaly present  Cardiovascular: Normal rate, S1 normal, intact distal pulses and normal pulses  An irregular rhythm present    No extrasystoles are present  Exam reveals distant heart sounds  Exam reveals no gallop and no friction rub  Murmur heard  Systolic murmur is present with a grade of 4/6   Pulmonary/Chest: Effort normal  No respiratory distress  She has decreased breath sounds  She has no wheezes  She has no rales  Abdominal: Soft  Bowel sounds are normal  She exhibits no distension  There is no tenderness  Musculoskeletal: Normal range of motion  She exhibits no edema, tenderness or deformity  Neurological: She is alert and oriented to person, place, and time  No cranial nerve deficit  Skin: Skin is warm and dry  No rash noted  Psychiatric: She has a normal mood and affect  Judgment and thought content normal    Nursing note and vitals reviewed  Discussion/Summary:    1  Valvular heart disease -  As stated Ambreen Alvares has confirmed severe aortic stenosis and moderate mitral stenosis  She is now going to be undergoing a TAVR next week  We will set her up for close outpatient follow-up, that have her scheduled for cardiac rehabilitation when she recovers  Her mitral stenosis will continue to be followed with echocardiograms and clinically  2   Coronary artery disease -  She has a history of coronary bypass grafting  As stated above, she has complete occlusion of her native coronary anatomy, with occlusion of 2 of her vein grafts  This will continue to be treated medically  She is on a good overall medical regimen  No changes were made today  3   Chronic systolic CHF -  She had a drop in her ejection fraction down to 35% during her most recent hospitalization  She is euvolemic on exam   She will remain on the same dose of Lasix  We will follow this closely perioperatively and have her back for a close outpatient visit within the next month or so  She knows to watch her sodium intake and watch her weight closely  4   Atrial fibrillation -  This has been persistent since I met her    Her heart rates are under good control  No changes were made  She is on Coumadin for stroke prevention  Counseling / Coordination of Care  Total floor / unit time spent today 25 minutes  Greater than 50% of total time was spent with the patient and / or family counseling and / or coordination of care

## 2018-03-28 NOTE — PROGRESS NOTES
[facility] - [facility] PRIMARY CARE [facility]  Transition of Care Visit  Patient ID: Nadeem Zuniga    : 1940  Age/Gender: [de-identified] y o  female     DATE: 3/28/2018      Assessment/Plan:    Hospital discharge follow-up  And patient presents for hospital follow-up  She was admitted to the hospital for an elective CTA with Dr Monsivais  After which she developed a contrast allergy and acute hypoxic respiratory failure she was placed on BiPAP and transition to the ICU  Chest x-ray showed acute CHF  Cardiology was consulted  She developed a non ST elevated MI with troponins elevating to  2 31  She was able to be weaned off of nasal oxygen and is breathing fine on room air  Her diuretics were increased to 40 mg twice daily  Cardiology evaluated her and she underwent a cardiac catheterization and DOMINGO  Cardiac catheterization showed worsening of CAD but did not show that she needed any stenting  Domingo showed worsening of ejection fraction at 37% she has been scheduled for an outpatient TAVR with Dr Alma Rosa Garner on 4/3/18  She had preop clearance with cardiology scheduled for tomorrow  Gastroesophageal reflux disease without esophagitis   Stable on Nexium no change  Will continue to follow  Postoperative hypothyroidism    Stable on Synthroid  Will continue to follow    Type 2 diabetes mellitus with complication, with long-term current use of insulin ([address] )   Patient currently on Lantus 30 units twice daily with metformin 1000 mg twice daily  CAD (coronary artery disease)   Patient following with Cardiology  Underwent cardiac catheterization while in the hospital   Scheduled for a heart valve replacement  Will follow Cardiology recommendations    Atrial fibrillation with rapid ventricular response (HCC)       Atherosclerosis of artery of extremity with ulceration [facility])   Patient following with Dr Valentine Beltrán, Vascular surgeon          Essential hypertension    Blood pressure controlled on medications    Moderate mitral stenosis   Patient being evaluated for valve replacement with Cardiology    PAD (peripheral artery disease) ([facility] )   Patient currently following a vascular as discussed above    Persistent atrial fibrillation ([place] [address] )  Rate controlled at this time taking metoprolol  Anticoagulated with Coumadin  Patient coming in tomorrow for INR  Order placed  She additionally has preoperative testing scheduled  Will need to discuss with her performing surgeon anticoagulation prior to procedure      Hyperlipidemia   Currently on statin    Chronic anticoagulation   Chronically anticoagulated with Coumadin for atrial fibrillation       Diagnoses and all orders for this visit:    Acute on chronic diastolic CHF (congestive heart failure) ([place] [address] )  -     Comprehensive metabolic panel; Future  -     CBC and differential; Future    Moderate mitral stenosis    Severe aortic stenosis  -     Protime-INR; Future    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 native heart without angina pectoris    Persistent atrial fibrillation (HCC)  -     Comprehensive metabolic panel; Future  -     CBC and differential; Future  -     Protime-INR; Future    LEONARD (acute kidney injury) ([place] [address] )    Prolonged Q-T interval on ECG    Essential hypertension    PAD (peripheral artery disease) ([facility])    Mixed hyperlipidemia    URI, acute  -     loratadine (CLARITIN) 10 mg tablet; Take 1 tablet (10 mg total) by mouth daily    Hospital discharge follow-up    Type 2 diabetes mellitus with complication, with long-term current use of insulin ([facility])  -     LANTUS 100 UNIT/ML subcutaneous injection; Inject 30 Units under the skin 2 (two) times a day IN THE MORNING AND 30 UNITS AT BEDTIME    Postoperative hypothyroidism    Gastroesophageal reflux disease without esophagitis    Atherosclerosis of artery of extremity with ulceration ([facility])    Chronic anticoagulation          Subjective:       Judge Haley is a [de-identified] y o  female who is here for TCM follow up on 3/28/2018  During the TCM phone call the patient stated:         [de-identified] yo female  Hx of CAGBx6 in 2004, basleine LBBB, HTN, Hyperlipidemia, DM and severe AS in 2016  She ntoes that as this visit her heart stopped  She rehabed and was at [facility]  She notes she was in hospital for scheduled procedure to evaluate for blockage to blood supply in the feet  She was getting OP CTA per TAVR protocol  After procedure had hypoxic resp failure, Transferred to MICU and placed on Bipap  CXR showed acute CHF  She suffered non-STEMI type 2 related to acute CHF  Troponin peaked 2 31  She was weaned from bipap and is now on Ra  Cardio had sent her for cardiac cath and ROBB and workup for TAVR procedure  Cath showed worsening CAD without need for stents  ROBB showed worsening of EF at 37%  She increased her lasix to BID at direction of cardio  [place] Tuesday with Dr Aparna Tyler for heart valve surgery  Since hospital feeling tired at times  She notes she has wound on RUE  Dr  Doctor has her using topical betadine for her feet  Hypertension   Pertinent negatives include no chest pain or palpitations  Diabetes   She presents for her follow-up diabetic visit  She has type 2 diabetes mellitus  Her disease course has been stable  Pertinent negatives for hypoglycemia include no dizziness  Associated symptoms include fatigue  Pertinent negatives for diabetes include no chest pain  The following portions of the patient's history were reviewed and updated as appropriate: allergies, current medications, past family history, past medical history, past social history, past surgical history and problem list     Review of Systems   Constitutional: Positive for fatigue  Negative for fever  Respiratory: Negative for cough and wheezing  Breathing improved since hospital   No longer using oxygen supplemental   Cardiovascular: Negative for chest pain and palpitations  Gastrointestinal: Negative for abdominal pain, diarrhea and vomiting  Musculoskeletal: Positive for arthralgias  Skin:        Note she has chronic wounds to lower extremity  Unchanged  Neurological: Negative for dizziness           Patient Active Problem List   Diagnosis    PAD (peripheral artery disease) ([facility] )    Essential hypertension    Critical aortic valve stenosis    CAD (coronary artery disease)    Type 2 diabetes mellitus with complication, with long-term current use of insulin ([facility])    Atrial flutter (HCC)    Hyperlipidemia    Gastroesophageal reflux disease without esophagitis    Moderate mitral stenosis    [facility]ARD [facility]) [facility]    Arthritis of hand    Acute cardiogenic pulmonary edema (HCC)    Asthma    Atrial fibrillation with rapid ventricular response (HCC)    Severe aortic stenosis    Asymptomatic carotid artery stenosis, bilateral    Atherosclerosis of artery of extremity with ulceration ([facility])    Hx of CABG    Chronic anticoagulation    Chronic diastolic congestive heart failure (HCC)    Chronic systolic heart failure (HCC)    Degenerative joint disease involving multiple joints    Diabetic retinopathy ([facility])    Postoperative hypothyroidism    Migraine headache    Nephrolithiasis    Osteoarthritis of both knees    Persistent atrial fibrillation (HCC)    Ulcer of toe of left foot (HCC)    Ulcer of toe of right foot ([facility] )    Acute on chronic diastolic CHF (congestive heart failure) ([facility])    [facility]up       Past Medical History:   Diagnosis Date    Altered mental status     Anticoagulated     Coumadin for Aflutter    Asthma     Atrial flutter (HCC)     maintained on coumadin anticoag    CAD (coronary artery disease)     Candidiasis     Carotid stenosis, bilateral     Cataract     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) ([facility])     Chronic fatigue syndrome     Chronic low back pain     Concussion w loss of consciousness of unsp duration, init     Coronary artery disease     Diabetes mellitus ([facility] )     type 2, insulin dependent    DJD (degenerative joint disease)     GERD (gastroesophageal reflux disease)     Herpes zoster     HLD (hyperlipidemia)     HTN (hypertension)     Hyperlipidemia     Hypothyroidism     Irritable bowel syndrome     Lumbar radiculopathy     Lyme disease     Dx in hospital 8/2011    Lyme disease     MI (myocardial infarction)     Migraines     Muscle spasm     Non-neoplastic nevus     Nontoxic multinodular goiter     OA (osteoarthritis)     Osteoarthritis     Other chronic pain     PAD (peripheral artery disease) (HCC)     Palpitations     Pseudogout     Spinal stenosis     Transient cerebral ischemia     Trigger ring finger     Viral gastroenteritis        Past Surgical History:   Procedure Laterality Date    APPENDECTOMY      ARTERIOGRAM  12/19/2017    CARDIAC CATHETERIZATION      CHOLECYSTECTOMY      COLONOSCOPY      CORONARY ARTERY BYPASS GRAFT  2004    LUMBAR LAMINECTOMY      THYROIDECTOMY      TOTAL ABDOMINAL HYSTERECTOMY W/ BILATERAL SALPINGOOPHORECTOMY           Current Outpatient Prescriptions:     albuterol (VENTOLIN HFA) 90 mcg/act inhaler, Inhale 108 mcg 2 (two) times a day as needed 2 puffs bid PRN, Disp: , Rfl:     aspirin 81 MG tablet, Take 81 mg by mouth daily, Disp: , Rfl:     atorvastatin (LIPITOR) 80 mg tablet, Take 80 mg by mouth daily, Disp: , Rfl:     calcium carbonate (TUMS) 500 mg chewable tablet, Chew as needed, Disp: , Rfl:     cholecalciferol (VITAMIN D3) 1,000 units tablet, Take 1,000 mg by mouth daily, Disp: , Rfl:     esomeprazole (NexIUM) 20 mg capsule, 20 mg daily, Disp: , Rfl:     furosemide (LASIX) 40 mg tablet, Take 1 tablet (40 mg total) by mouth 2 (two) times a day, Disp: 60 tablet, Rfl: 0    LANTUS 100 UNIT/ML subcutaneous injection, Inject 30 Units under the skin 2 (two) times a day IN THE MORNING AND 30 UNITS AT BEDTIME, Disp: 10 mL, Rfl: 0    levothyroxine 100 mcg tablet, Take 100 mcg by mouth daily, Disp: , Rfl:     lisinopril (ZESTRIL) 5 mg tablet, Take 5 mg by mouth daily at bedtime, Disp: , Rfl:     loratadine (CLARITIN) 10 mg tablet, Take 1 tablet (10 mg total) by mouth daily, Disp: 30 tablet, Rfl: 0    Magnesium Oxide 400 MG CAPS, Take 400 mg by mouth 2 (two) times a day, Disp: , Rfl:     metFORMIN (GLUCOPHAGE) 1000 MG tablet, 1,000 mg 2 (two) times a day, Disp: , Rfl:     metoprolol succinate (TOPROL-XL) 100 mg 24 hr tablet, 100 mg daily at bedtime, Disp: , Rfl:     metoprolol succinate (TOPROL-XL) 50 mg 24 hr tablet, Take 1 tablet by mouth daily with breakfast, Disp: , Rfl:     Mometasone Furoate (ASMANEX HFA) 100 MCG/ACT AERO, Inhale 2 puffs once daily, Disp: 1 Inhaler, Rfl: 5    nitroglycerin (NITROSTAT) 0 4 mg SL tablet, Place 0 4 mg under the tongue every 3 (three) minutes as needed, Disp: , Rfl:     potassium chloride (K-DUR,KLOR-CON) 10 mEq tablet, Take 1 tablet (10 mEq total) by mouth 2 (two) times a day, Disp: 60 tablet, Rfl: 0    warfarin (COUMADIN) 3 mg tablet, Take 3 mg by mouth daily 6 mg tuesdays, 3 mg all other days, Disp: , Rfl:     Allergies   Allergen Reactions    Other Chest Pain     IVP-listed as "chest pain" in previous chart, patient stated this occurred "a long time ago" but does not remember exactly occurred     Cephalosporins Chest Pain    Contrast [Iodinated Diagnostic Agents] Other (See Comments)     Flash pulm edema    Doxycycline Chest Pain    Levaquin [Levofloxacin] Chest Pain    Ondansetron Chest Pain     Prolonged QT    Toradol [Ketorolac Tromethamine] Chest Pain       Social History     Social History    Marital status:       Spouse name: N/A    Number of children: N/A    Years of education: N/A     Social History Main Topics    Smoking status: Never Smoker    Smokeless tobacco: Never Used    Alcohol use No    Drug use: No    Sexual activity: No     Other Topics Concern    None     Social History Narrative    None       Family History   Problem Relation Age of Onset    Cancer Mother     Stroke Mother     Cancer Father     Heart disease Father     Breast cancer Sister     Diabetes Daughter      Passed away January 2017        Patient Care Team:  Heath Arauz MD as PCP - MD Galina Lara CRNP Everlina Milroy, MD Charletta Carwin Doctor, MD    Immunization History   Administered Date(s) Administered    Influenza 11/29/2006, 11/28/2007, 11/06/2008, 12/01/2010, 12/01/2014, 10/01/2015, 10/01/2016, 11/22/2016, 10/18/2017    Influenza Split High Dose Preservative Free IM 10/30/2013, 10/01/2015, 10/01/2016, 11/22/2016, 10/18/2017    Influenza TIV (IM) 11/16/2009, 01/16/2012, 12/01/2014    Pneumococcal Conjugate 13-Valent 06/17/2016    Pneumococcal Polysaccharide PPV23 12/08/2008    TD (adult) Preservative Free 11/20/2006    Tdap 01/09/2017    Tuberculin Skin Test-PPD Intradermal 08/17/2016        Objective:  Vitals:    03/28/18 1641   BP: 122/74   BP Location: Left arm   Patient Position: Sitting   Cuff Size: Adult   Pulse: 70   Temp: 98 5 °F (36 9 °C)   TempSrc: Oral   SpO2: 98%   Weight: 75 8 kg (167 lb)   Height: 5' 4 5" (1 638 m)     Wt Readings from Last 3 Encounters:   03/28/18 75 8 kg (167 lb)   03/28/18 74 8 kg (165 lb)   03/26/18 75 3 kg (166 lb)     Body mass index is 28 22 kg/m²  No exam data present       Physical Exam   Constitutional: She is oriented to person, place, and time  She appears well-developed and well-nourished  No distress  Chronically ill-appearing 70-year-old female seated in room in no acute distress   HENT:   Head: Normocephalic and atraumatic  Mouth/Throat: Oropharynx is clear and moist  No oropharyngeal exudate  Eyes: Conjunctivae are normal  Right eye exhibits no discharge  Left eye exhibits no discharge  No scleral icterus     Neck: Neck supple  Cardiovascular: Normal rate and regular rhythm  Murmur (7-9/ 6 systolic murmur  right upper sternal border) heard  Pulmonary/Chest: Effort normal  No respiratory distress  She has no wheezes  She has rales ( mild crackles bilateral bases)  She exhibits no tenderness  No rhonchi  po2 stable on room air no respiratory distress talkative  Talking in complete sentences   Abdominal: Soft  Bowel sounds are normal  There is no tenderness  Soft nontender   Musculoskeletal: She exhibits edema ( trace bilateral lower extremity edema +1 palpable DP pulses bilateral   Mild erythema the distal toes bilaterally  Left 2nd toe hammertoe with healing ulceration right foot MCP joint with ulceration sensation intact  Bilaterally)  Neurological: She is alert and oriented to person, place, and time  No gross focal neurologic deficits on exam   Skin: Skin is warm and dry  She is not diaphoretic  There is erythema ( erythema of the distal foot bilaterally)  Psychiatric: She has a normal mood and affect  Her behavior is normal  Thought content normal    Alert pleasant cooperative talkative   Nursing note and vitals reviewed            Health Maintenance   Topic Date Due    GLAUCOMA SCREENING 67+ YR  03/26/2005    DXA SCAN  01/15/2010    OPHTHALMOLOGY EXAM  11/21/2017    URINE MICROALBUMIN  02/27/2018    Diabetic Foot Exam  11/22/2018    Fall Risk  01/12/2019    Depression Screening PHQ-9  01/12/2019    Urinary Incontinence Screening  01/12/2019    DTaP,Tdap,and Td Vaccines (2 - Td) 01/09/2027    INFLUENZA VACCINE  Completed    PNEUMOCOCCAL POLYSACCHARIDE VACCINE AGE 72 AND OVER  Completed     Future Appointments  Date Time Provider Haim Moon   3/29/2018 1:00 PM Praveen Lewis MD CV SURG [facility]   5/29/2018 9:30 AM Latricia Mendoza MD [address]   5/30/2018 3:00 PM Arpita Boles MD [facility]   7/5/2018 10:00 AM BE HV VASCULAR 3 BE [facility] [address] 9/26/2018 1:00 PM BE HV VASCULAR 2 [facility] [address]   10/1/2018 2:00 PM Jose Monsivais, MD VAS CTR [facility]   1/4/2019 10:00 AM BE HV VASCULAR 1 [address]       [address] PRIMARY CARE [facility] [place]

## 2018-03-29 ENCOUNTER — APPOINTMENT (OUTPATIENT)
Dept: LAB | Facility: HOSPITAL | Age: 78
End: 2018-03-29
Attending: THORACIC SURGERY (CARDIOTHORACIC VASCULAR SURGERY)
Payer: MEDICARE

## 2018-03-29 ENCOUNTER — CLINICAL SUPPORT (OUTPATIENT)
Dept: INTERNAL MEDICINE CLINIC | Facility: CLINIC | Age: 78
End: 2018-03-29
Payer: MEDICARE

## 2018-03-29 ENCOUNTER — ANTICOAG VISIT (OUTPATIENT)
Dept: INTERNAL MEDICINE CLINIC | Age: 78
End: 2018-03-29

## 2018-03-29 ENCOUNTER — OFFICE VISIT (OUTPATIENT)
Dept: CARDIAC SURGERY | Facility: CLINIC | Age: 78
End: 2018-03-29
Payer: MEDICARE

## 2018-03-29 VITALS
DIASTOLIC BLOOD PRESSURE: 52 MMHG | HEIGHT: 64 IN | WEIGHT: 167 LBS | HEART RATE: 88 BPM | BODY MASS INDEX: 28.51 KG/M2 | RESPIRATION RATE: 14 BRPM | SYSTOLIC BLOOD PRESSURE: 100 MMHG | OXYGEN SATURATION: 97 % | TEMPERATURE: 97.9 F

## 2018-03-29 DIAGNOSIS — I35.0 SEVERE AORTIC STENOSIS: Primary | ICD-10-CM

## 2018-03-29 DIAGNOSIS — I35.0 SEVERE AORTIC STENOSIS: ICD-10-CM

## 2018-03-29 DIAGNOSIS — I35.0 NONRHEUMATIC AORTIC VALVE STENOSIS: ICD-10-CM

## 2018-03-29 DIAGNOSIS — I50.33 ACUTE ON CHRONIC DIASTOLIC HEART FAILURE (HCC): ICD-10-CM

## 2018-03-29 DIAGNOSIS — I48.19 PERSISTENT ATRIAL FIBRILLATION (HCC): Primary | ICD-10-CM

## 2018-03-29 LAB
ALBUMIN SERPL BCP-MCNC: 3.7 G/DL (ref 3.5–5)
ALP SERPL-CCNC: 71 U/L (ref 46–116)
ALT SERPL W P-5'-P-CCNC: 24 U/L (ref 12–78)
ANION GAP SERPL CALCULATED.3IONS-SCNC: 8 MMOL/L (ref 4–13)
AST SERPL W P-5'-P-CCNC: 25 U/L (ref 5–45)
BASOPHILS # BLD AUTO: 0.04 THOUSANDS/ΜL (ref 0–0.1)
BASOPHILS NFR BLD AUTO: 1 % (ref 0–1)
BILIRUB SERPL-MCNC: 0.64 MG/DL (ref 0.2–1)
BUN SERPL-MCNC: 29 MG/DL (ref 5–25)
CALCIUM SERPL-MCNC: 8.2 MG/DL (ref 8.3–10.1)
CHLORIDE SERPL-SCNC: 100 MMOL/L (ref 100–108)
CO2 SERPL-SCNC: 29 MMOL/L (ref 21–32)
CREAT SERPL-MCNC: 0.98 MG/DL (ref 0.6–1.3)
EOSINOPHIL # BLD AUTO: 0.34 THOUSAND/ΜL (ref 0–0.61)
EOSINOPHIL NFR BLD AUTO: 4 % (ref 0–6)
ERYTHROCYTE [DISTWIDTH] IN BLOOD BY AUTOMATED COUNT: 18.8 % (ref 11.6–15.1)
GFR SERPL CREATININE-BSD FRML MDRD: 55 ML/MIN/1.73SQ M
GLUCOSE P FAST SERPL-MCNC: 167 MG/DL (ref 65–99)
HCT VFR BLD AUTO: 34.1 % (ref 34.8–46.1)
HGB BLD-MCNC: 10.2 G/DL (ref 11.5–15.4)
INR PPP: 2.37 (ref 0.86–1.16)
INR PPP: 2.37 (ref 0.86–1.16)
LYMPHOCYTES # BLD AUTO: 1.94 THOUSANDS/ΜL (ref 0.6–4.47)
LYMPHOCYTES NFR BLD AUTO: 22 % (ref 14–44)
MCH RBC QN AUTO: 20.9 PG (ref 26.8–34.3)
MCHC RBC AUTO-ENTMCNC: 29.9 G/DL (ref 31.4–37.4)
MCV RBC AUTO: 70 FL (ref 82–98)
MONOCYTES # BLD AUTO: 0.87 THOUSAND/ΜL (ref 0.17–1.22)
MONOCYTES NFR BLD AUTO: 10 % (ref 4–12)
NEUTROPHILS # BLD AUTO: 5.67 THOUSANDS/ΜL (ref 1.85–7.62)
NEUTS SEG NFR BLD AUTO: 63 % (ref 43–75)
NRBC BLD AUTO-RTO: 0 /100 WBCS
PLATELET # BLD AUTO: 280 THOUSANDS/UL (ref 149–390)
PMV BLD AUTO: 11.8 FL (ref 8.9–12.7)
POTASSIUM SERPL-SCNC: 4.7 MMOL/L (ref 3.5–5.3)
PROT SERPL-MCNC: 7 G/DL (ref 6.4–8.2)
PROTHROMBIN TIME: 26.2 SECONDS (ref 12.1–14.4)
RBC # BLD AUTO: 4.89 MILLION/UL (ref 3.81–5.12)
SODIUM SERPL-SCNC: 137 MMOL/L (ref 136–145)
WBC # BLD AUTO: 8.88 THOUSAND/UL (ref 4.31–10.16)

## 2018-03-29 PROCEDURE — 85610 PROTHROMBIN TIME: CPT | Performed by: PHYSICIAN ASSISTANT

## 2018-03-29 PROCEDURE — 99215 OFFICE O/P EST HI 40 MIN: CPT | Performed by: NURSE PRACTITIONER

## 2018-03-29 PROCEDURE — 36415 COLL VENOUS BLD VENIPUNCTURE: CPT

## 2018-03-29 PROCEDURE — 80053 COMPREHEN METABOLIC PANEL: CPT | Performed by: PHYSICIAN ASSISTANT

## 2018-03-29 PROCEDURE — 86900 BLOOD TYPING SEROLOGIC ABO: CPT | Performed by: THORACIC SURGERY (CARDIOTHORACIC VASCULAR SURGERY)

## 2018-03-29 PROCEDURE — 86850 RBC ANTIBODY SCREEN: CPT | Performed by: THORACIC SURGERY (CARDIOTHORACIC VASCULAR SURGERY)

## 2018-03-29 PROCEDURE — 86850 RBC ANTIBODY SCREEN: CPT

## 2018-03-29 PROCEDURE — 81003 URINALYSIS AUTO W/O SCOPE: CPT

## 2018-03-29 PROCEDURE — 85025 COMPLETE CBC W/AUTO DIFF WBC: CPT | Performed by: PHYSICIAN ASSISTANT

## 2018-03-29 PROCEDURE — 87081 CULTURE SCREEN ONLY: CPT | Performed by: INTERNAL MEDICINE

## 2018-03-29 PROCEDURE — 86901 BLOOD TYPING SEROLOGIC RH(D): CPT | Performed by: THORACIC SURGERY (CARDIOTHORACIC VASCULAR SURGERY)

## 2018-03-29 RX ORDER — CHLORHEXIDINE GLUCONATE 0.12 MG/ML
15 RINSE ORAL ONCE
Status: CANCELLED | OUTPATIENT
Start: 2018-03-29 | End: 2018-03-29

## 2018-03-29 RX ORDER — PREDNISONE 20 MG/1
TABLET ORAL
Qty: 6 TABLET | Refills: 0 | Status: SHIPPED | OUTPATIENT
Start: 2018-03-29 | End: 2018-03-29

## 2018-03-29 RX ORDER — FAMOTIDINE 20 MG/1
TABLET, FILM COATED ORAL
Qty: 2 TABLET | Refills: 0 | Status: SHIPPED | OUTPATIENT
Start: 2018-03-29 | End: 2018-04-06 | Stop reason: HOSPADM

## 2018-03-29 RX ORDER — DIPHENHYDRAMINE HCL 50 MG
CAPSULE ORAL
Qty: 1 CAPSULE | Refills: 0 | Status: SHIPPED | OUTPATIENT
Start: 2018-03-29 | End: 2018-03-29

## 2018-03-29 NOTE — ASSESSMENT & PLAN NOTE
Rate controlled at this time taking metoprolol  Anticoagulated with Coumadin  Patient coming in tomorrow for INR  Order placed  She additionally has preoperative testing scheduled    Will need to discuss with her performing surgeon anticoagulation prior to procedure

## 2018-03-29 NOTE — H&P
History & Physical - Cardiothoracic Surgery   Bertis Councilman [de-identified] y o  female MRN: [id number]    Physician Requesting Consult: Jake Boojrquez MD    Reason for Consult / Principal Problem: Aortic stenosis    History of Present Illness: Bertis Councilman is a [de-identified]y o  year old female who was previously evaluated in our office by RUTH Hartley  for transcatheter aortic valve replacement on 2/21/2018  During this initial consultation, arrangements were made for the following studies to be completed: Gated CTA of the chest/abdomen/pelvis, 3D ROBB, left and right cardiac catheterization, dental clearance, pulmonary function tests with ABG, and carotid artery ultrasound  Bertis Councilman now presents to review the results of these tests and obtain a second surgeon to confirm the suitability of proceeding with transcatheter aortic valve replacment  Ratna Goncalves is referred to us by Dr Jake Bojorquez  Her PMH includes CAD/MSTEMI s/p CABG x 6 (2004 at [facility]), HTN, hyperlipidemia, PAD (h/o non-healing L medial great toe ulcer), DM type 2 (insulin), PAF, hypothyroidism, asthma and GERD  She previous had been receiving her medical care through the [address] [place] [address] network  She was diagnosed with aortic stenosis in 2016  She was admitted to the [address] in December 2017 with flash pulmonary edema and elevated troponin  Her echocardiogram that was performed in March 2017 demonstrated severe aortic stenosis  She had follow up with cardiology after her hospital admission and agreed to surgical consultation  She is symptomatic with progressive SOB/IGNACIO  She also experiences lightheadedness but denies syncope  Her weight a lower extremity edema is stable on an increased dose of Furosemide (40 mg BID)  She denies chest pain, palpitations, PND, orthopnea  She has bilateral foot pain (burning sensation), right foot worse than eft related to LE arterial insufficiency  She has dry, non-healing ulcers on her toes of both feet   She states she is followed by Dr Monsivais and was just seen on Monday  She is a non smoker, non-drinker  She is edentulous with upper and lower dentures       Past Medical History:  Past Medical History:   Diagnosis Date    Altered mental status     Anticoagulated     Coumadin for Aflutter    Asthma     Atrial flutter ([address] )     maintained on coumadin anticoag    CAD (coronary artery disease)     Candidiasis     Carotid stenosis, bilateral     Cataract     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) [facility]     Chronic fatigue syndrome     Chronic low back pain     Concussion w loss of consciousness of unsp duration, init     Coronary artery disease     Diabetes mellitus ([facility]     type 2, insulin dependent    DJD (degenerative joint disease)     GERD (gastroesophageal reflux disease)     Herpes zoster     HLD (hyperlipidemia)     HTN (hypertension)     Hyperlipidemia     Hypothyroidism     Irritable bowel syndrome     Lumbar radiculopathy     Lyme disease     Dx in hospital 8/2011    Lyme disease     MI (myocardial infarction)     Migraines     Muscle spasm     Non-neoplastic nevus     Nontoxic multinodular goiter     OA (osteoarthritis)     Osteoarthritis     Other chronic pain     PAD (peripheral artery disease) ([facility]     Palpitations     Pseudogout     Spinal stenosis     Transient cerebral ischemia     Trigger ring finger     Viral gastroenteritis          Past Surgical History:   Past Surgical History:   Procedure Laterality Date    APPENDECTOMY      ARTERIOGRAM  12/19/2017    CARDIAC CATHETERIZATION      CHOLECYSTECTOMY      COLONOSCOPY      CORONARY ARTERY BYPASS GRAFT  2004    LUMBAR LAMINECTOMY      THYROIDECTOMY      TOTAL ABDOMINAL HYSTERECTOMY W/ BILATERAL SALPINGOOPHORECTOMY           Family History:  Family History   Problem Relation Age of Onset    Cancer Mother     Stroke Mother     Cancer Father     Heart disease Father     Breast cancer Sister     Diabetes Daughter      Passed away January 2017          Social History:    History   Alcohol Use No     History   Drug Use No     History   Smoking Status    Never Smoker   Smokeless Tobacco    Never Used       Home Medications:   Prior to Admission medications    Medication Sig Start Date End Date Taking?  Authorizing Provider   albuterol (VENTOLIN HFA) 90 mcg/act inhaler Inhale 108 mcg 2 (two) times a day as needed 2 puffs bid PRN    Historical Provider, MD   aspirin 81 MG tablet Take 81 mg by mouth daily    Historical Provider, MD   atorvastatin (LIPITOR) 80 mg tablet Take 80 mg by mouth daily    Historical Provider, MD   calcium carbonate (TUMS) 500 mg chewable tablet Chew as needed    Historical Provider, MD   cholecalciferol (VITAMIN D3) 1,000 units tablet Take 1,000 mg by mouth daily 10/20/16   Historical Provider, MD   esomeprazole (NexIUM) 20 mg capsule 20 mg daily    Historical Provider, MD   furosemide (LASIX) 40 mg tablet Take 1 tablet (40 mg total) by mouth 2 (two) times a day 3/20/18   Cande Ron DO   LANTUS 100 UNIT/ML subcutaneous injection Inject 30 Units under the skin 2 (two) times a day IN THE MORNING AND 30 UNITS AT BEDTIME 3/28/18   Roberta Justice PA-C   levothyroxine 100 mcg tablet Take 100 mcg by mouth daily    Historical Provider, MD   lisinopril (ZESTRIL) 5 mg tablet Take 5 mg by mouth daily at bedtime    Historical Provider, MD   loratadine (CLARITIN) 10 mg tablet Take 1 tablet (10 mg total) by mouth daily 3/28/18   Roberta Justice PA-C   Magnesium Oxide 400 MG CAPS Take 400 mg by mouth 2 (two) times a day 8/31/16   Historical Provider, MD   metFORMIN (GLUCOPHAGE) 1000 MG tablet 1,000 mg 2 (two) times a day    Historical Provider, MD   metoprolol succinate (TOPROL-XL) 100 mg 24 hr tablet 100 mg daily at bedtime    Historical Provider, MD   metoprolol succinate (TOPROL-XL) 50 mg 24 hr tablet Take 1 tablet by mouth daily with breakfast 9/1/16   Historical Provider, MD Mometasone Furoate [facility] HFA) 100 MCG/ACT AERO Inhale 2 puffs once daily 3/12/18   Adriel Francisco MD   nitroglycerin (NITROSTAT) 0 4 mg SL tablet Place 0 4 mg under the tongue every 3 (three) minutes as needed 5/11/15   Historical Provider, MD   potassium chloride (K-DUR,KLOR-CON) 10 mEq tablet Take 1 tablet (10 mEq total) by mouth 2 (two) times a day 3/20/18   Krysta Finney DO   warfarin (COUMADIN) 3 mg tablet Take 3 mg by mouth daily 6 mg tuesdays, 3 mg all other days 6/27/16   Historical Provider, MD       Allergies:   Allergies   Allergen Reactions    Other Chest Pain     IVP-listed as "chest pain" in previous chart, patient stated this occurred "a long time ago" but does not remember exactly occurred     Cephalosporins Chest Pain    Contrast [Iodinated Diagnostic Agents] Other (See Comments)     Flash pulm edema    Doxycycline Chest Pain    Levaquin [Levofloxacin] Chest Pain    Ondansetron Chest Pain     Prolonged QT    Toradol [Ketorolac Tromethamine] Chest Pain       Review of Systems:  Review of Systems - History obtained from chart review and the patient  General ROS: positive for fatigue; negative for fevers, chills, recent weight gain, sleep disturbance, night sweats  Psychological ROS: negative  Hematological and Lymphatic ROS: positive for - bruising  negative for - bleeding problems or blood clots  Respiratory ROS: positive for - shortness of breath  negative for - cough, hemoptysis, orthopnea or wheezing  Cardiovascular ROS: positive for - dyspnea on exertion  negative for - chest pain, orthopnea or paroxysmal nocturnal dyspnea  Gastrointestinal ROS: no abdominal pain, change in bowel habits, or black or bloody stools  Musculoskeletal ROS: negative  Neurological ROS: no TIA or stroke symptoms    Vital Signs:     Vitals:    03/29/18 1200 03/29/18 1253   BP: 102/52 100/52   BP Location: Left arm Right arm   Cuff Size: Adult    Pulse: 88    Resp: 14    Temp: 97 9 °F (36 6 °C)    TempSrc: Oral    SpO2: 97%    Weight: 75 8 kg (167 lb)    Height: 5' 4" (1 626 m)        Physical Exam:    General: Well developed, no acute distress  Awake, Alert and Oriented  HEENT/NECK:  PERRLA  No jugular venous distention  Cardiac:Regular rate and rhythm, 3/6 harsh systolic murmur, loudest at RUSB  Carotids: 1+ pulses, delayed upstrokes, no bruits  Pulmonary:  Breath sounds clear bilaterally  Abdomen:  Non-tender, Non-distended  Positive bowel sounds  Lower extremities: Extremities warm/dry  PT/DP pulses absent bilaterally  Bilateral Femoral pulses 2 +  Trace edema right foot, no edema on the left  L medial great toe and R medial great toe, plantar surface of R 3rd toe and lateral aspect of R 5th toe all with ~3mm dry wounds with overlying eschar and no signs of infection  R great toe with ~4mm skin tear, superficial, dry eschar  Neuro: Alert and oriented X 3  Sensation is grossly intact  No focal deficits  Skin: Warm/Dry, without rashes or lesions      Lab Results:   Lab Results   Component Value Date    WBC 14 90 (H) 03/20/2018    HGB 9 0 (L) 03/20/2018    HCT 29 5 (L) 03/20/2018    MCV 70 (L) 03/20/2018     03/20/2018     Lab Results   Component Value Date    GLUCOSE 190 (H) 03/20/2018    CALCIUM 7 4 (L) 03/20/2018     03/20/2018    K 3 9 03/20/2018    CO2 25 03/20/2018     (H) 03/20/2018    BUN 27 (H) 03/20/2018    CREATININE 0 81 03/20/2018       Results from last 7 days  Lab Units 03/23/18  1447 03/23/18   INR  1 34* 1 34*     Lab Results   Component Value Date    HGBA1C 7 5 (H) 03/17/2018     Lab Results   Component Value Date    CKTOTAL 83 06/09/2017    TROPONINI 2 02 (H) 03/16/2018       Imaging Studies:     Gated CTA of the chest/abdomen/pelvis:   FINDINGS:     VASCULAR STRUCTURES:       Annulus: diameter 31 4 x 20 0 mm      area: 482 2 sq mm    Annulus to LCA: 14 4 mm    Annulus to RCA: 12 9 mm    Minimal diameter right iliofemoral segment: 5 1 mm    Minimal diameter left iliofemoral segment: 6 6 mm     Heart size is enlarged  Aortic valve is moderately calcified  There is also mitral valve annular calcifications  There is no pericardial effusion  There is been prior vein grafting to the left coronary  Atretic appearing possibly occluded LIMA graft   is also seen  The ascending aorta is ectatic measuring 37 mm  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  There is no great vessel stenosis proximally  There is moderate calcification of the aortic arch and descending thoracic   aorta which are normal in caliber  The abdominal aorta is tortuous but normal in caliber  There is a moderate stenosis of the proximal right common iliac artery with the lumen narrows to 5 mm with comparison measurements of 10 mm of the distal common   iliac  Internal iliac arteries are patent bilaterally  There is no significant external or common femoral stenosis  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 with relatively high origin of the medial profunda branch  There is no celiac   stenosis  There is a concentric stenosis of the SMA 1 cm from its origin measuring 40% in severity  No significant renal artery stenosis is seen        3D ROBB:   SUMMARY     LEFT VENTRICLE:  Systolic function was moderately reduced  Ejection fraction was estimated to be 35 %  There was moderate diffuse hypokinesis  Wall thickness was moderately increased  There was moderate concentric hypertrophy      LEFT ATRIUM:  The atrium was markedly dilated      ATRIAL SEPTUM:  No defect or patent foramen ovale was identified by color Doppler      RIGHT ATRIUM:  The atrium was moderately dilated      MITRAL VALVE:  There was marked annular calcification  There was marked diffuse thickening  There was marked calcification of the anterior and posterior leaflets, involving the leaflet base more than the margin  There was moderately reduced leaflet separation  There was moderate stenosis    There was mild to moderate regurgitation  Regurgitation grade was 1-2+ on a scale of 0 to 4+  Mean transmitral gradient was 8 mmHg  The mitral valve area by proximal isovelocity surface area method was 1 42 cm squared  The effective orifice of mitral regurgitation by proximal isovelocity surface area was 0 23 cm squared  The volume of mitral regurgitation by proximal isovelocity surface area was 33 ml      AORTIC VALVE:  The valve was trileaflet  Leaflets exhibited markedly increased thickness, marked calcification, and markedly reduced cuspal separation  There was critical stenosis  LVOT diameter was measured as 22 mm  The aortic annulus are measured 4 23 cm2  The nasima-posterior and horizontal diameters of aortic annulus measured 2 25 and 2 36 cm respectively  The aortic root measured 30  mm at sinus of valsalva and 27 mm at sino-tubular junction  The proximal ascending aorta measured 35 mm  There was mild regurgitation  Valve mean gradient was 30 mmHg  Estimated aortic valve area (by VTI) was 0 46 cm squared  Estimated aortic valve area (by Vmax) was 0 57 cm squared      TRICUSPID VALVE:  There was mild regurgitation  Estimated peak PA pressure was 60 mmHg  The findings suggest moderate pulmonary hypertension      PULMONIC VALVE:  There was trace regurgitation      AORTA:  There was atheroma in the proximal descending aorta  Left and right cardiac catheterization:   Angiographic findings  Native coronary lesions:  ·Mid LAD: Lesion 1: 90 % stenosis  ·D1: Lesion 1: 100 % stenosis  ·D2: Lesion 1: 100 % stenosis  ·D3: Lesion 1: 100 % stenosis  ·OM1: Lesion 1: 100 % stenosis  ·Proximal RCA: Lesion 1: 60 % stenosis    ·RPDA: Lesion 1: 50 % stenosis      Coronary graft lesions:  ·Graft to D1: SVG  · 50 % stenosis at proximal anastomosis, discrete      ·Graft to OM1: SVG  · 100 % stenosis at proximal anastomosis      ·Graft to RPDA: SVG  · 100 % stenosis at proximal anastomosis    Pulmonary function tests: FeV1 1 16 L; 60% predicted  DLCO 40%    Arterial Blood gas: pH 7 45, pO2 75 pCO2 34    Carotid artery ultrasound:  RIGHT:  There is <50% stenosis noted in the internal carotid artery  Plaque is  heterogenous and irregular  Vertebral artery flow is antegrade  There is no significant subclavian artery  disease  LEFT:  There is <50% stenosis noted in the internal carotid artery  Plaque is  heterogenous and irregular  Vertebral artery flow is antegrade  There is no significant subclavian artery  Disease  I have personally reviewed pertinent reports        TAVR evaluation Assessment:     5 Meter Walk Test:      Attempt 1: 9 sec   Attempt 2: 8 sec   Attempt 3: 8 sec    STS Risk Score: 11%      NYHC: III    KCCQ-12 completed    Assessment:  Patient Active Problem List    Diagnosis Date Noted    Acute on chronic diastolic CHF (congestive heart failure) ([facility]) 03/28/2018    Prolonged Q-T interval on ECG 03/28/2018   [facility] [place] follow-up 03/28/2018    Ulcer of toe of right foot ([address] ) 03/26/2018    Severe aortic stenosis 03/23/2018    Atrial fibrillation with rapid ventricular response ([address] )     Asthma 03/16/2018    Acute cardiogenic pulmonary edema ([address] ) 03/15/2018    Hx of CABG 01/15/2018    Chronic diastolic congestive heart failure ([address] ) 01/15/2018    Persistent atrial fibrillation ([address] ) 01/15/2018    PAD (peripheral artery disease) ([address] ) 12/19/2017    Essential hypertension 12/19/2017    Critical aortic valve stenosis 12/19/2017    CAD (coronary artery disease) 12/19/2017    Type 2 diabetes mellitus with complication, with long-term current use of insulin ([address] ) 12/19/2017    Atrial flutter ([address] ) 12/19/2017    Hyperlipidemia 12/19/2017    Gastroesophageal reflux disease without esophagitis 12/19/2017    Moderate mitral stenosis 12/19/2017    LEONARD (acute kidney injury) ([address] ) 12/19/2017    Asymptomatic carotid artery stenosis, bilateral 11/16/2017    Ulcer of toe of left foot ([address] ) 10/23/2017    Migraine headache 10/18/2017    Degenerative joint disease involving multiple joints 07/28/2017    [facility]ros[facility] 06/09/2017    Chronic anticoagulation 08/22/2016    Diabetic retinopathy ([facility] ) 08/02/2016    Chronic systolic heart failure ([facility] ) 07/20/2016    Nephrolithiasis 12/17/2015    Osteoarthritis of both knees 11/23/2015    Arthritis of hand 12/26/2014    Postoperative hypothyroidism 10/16/2013     Severe aortic stenosis; plan to schedule TAVR     Plan:    Chai Angel has severe symptomatic aortic stenosis  Based on her STS risk assessment, she has undergone testing for transcatheter aortic valve replacement  The results of these studies have been interpreted by two independent cardiac surgeons who have determined the patient to be High risk for open aortic valve replacement  The risks, benefits, and alternatives to TAVR were discussed in detail with the patient today  She understands and wish to proceed with transfemoral transcatheter aortic valve replacement  Informed consent was obtained and a date for the intervention has been set  Chai Angel was comfortable with our recommendations, and her questions were answered to her satisfaction  The following preoperative instructions were provided at the conclusion of their consultation:     1  You will receive a phone call from the hospital between 2:00 PM and 8:00 PM the day prior to surgery to confirm arrival time and location  For surgery on Mondays, you will receive a call on Friday  2  Do not drink or eat anything after midnight the night before surgery  That includes no water, candy, gum, lozenges, Lifesavers, etc  We recommend you not eat any salty or fatty snack food, consume alcohol or smoke the night before surgery  3  Continue taking aspirin but only 81 mg daily  4  If you take Coumadin and/or Plavix, discontinue it 5 days before surgery    5  If you are diabetic, do not take any of your diabetic pills the morning of surgery  If you take Lantus insulin, you may take it at your regularly scheduled time the day before surgery  Do not take any other insulins the morning of surgery  6  The 2 nights before surgery, take a shower each night using the special antiseptic soap or soap sponges you received from the office or [facility] your hair with regular shampoo and rinse completely before using the antiseptic sponges  Use the sponge to wash from your neck down, with special attention to the armpits and groin area  Do not use any other soap or cleanser on your skin  Do not use lotions, powder, deodorant or perfume of any kind on your skin after you shower  Use clean bed linens and wear clean pajamas after your shower  7  You will be prescribed Mupirocin nasal ointment  Apply to both nostrils twice a day for 5 days prior to surgery  8  Do not take a shower the morning of her surgery; you'll be given a special" bath" at the hospital   9  Notify the CT surgery office if you develop a cold, sore throat, cough, fever or other health issues before your surgery  10  Other medication changes included the following: Stop Coumadin 5 days before surgery  Stop Lisinopril and Metformin 3 days before surgery       SIGNATURE: RAMO Robledo  DATE: March 29, 2018  TIME: 1:27 PM

## 2018-03-29 NOTE — PROGRESS NOTES
History & Physical - Cardiothoracic Surgery   Nam Love [de-identified] y o  female MRN: [id number]    Physician Requesting Consult: Rubens Jeffers MD    Reason for Consult / Principal Problem: Aortic stenosis    History of Present Illness: Nam Love is a [de-identified]y o  year old female who was previously evaluated in our office by RUTH Nuñez Ma  for transcatheter aortic valve replacement on 2/21/2018  During this initial consultation, arrangements were made for the following studies to be completed: Gated CTA of the chest/abdomen/pelvis, 3D ROBB, left and right cardiac catheterization, dental clearance, pulmonary function tests with ABG, and carotid artery ultrasound  Nam Love now presents to review the results of these tests and obtain a second surgeon to confirm the suitability of proceeding with transcatheter aortic valve replacment  Andrew Eaton is referred to us by Dr Rubens Jeffers  Her PMH includes CAD/MSTEMI s/p CABG x 6 (2004 at [facility]), HTN, hyperlipidemia, PAD (h/o non-healing L medial great toe ulcer), DM type 2 (insulin), PAF, hypothyroidism, asthma and GERD  She previous had been receiving her medical care through the [facility] network  She was diagnosed with aortic stenosis in 2016  She was admitted to the [address] in December 2017 with flash pulmonary edema and elevated troponin  Her echocardiogram that was performed in March 2017 demonstrated severe aortic stenosis  She had follow up with cardiology after her hospital admission and agreed to surgical consultation  She is symptomatic with progressive SOB/IGNACIO  She also experiences lightheadedness but denies syncope  Her weight a lower extremity edema is stable on an increased dose of Furosemide (40 mg BID)  She denies chest pain, palpitations, PND, orthopnea  She has bilateral foot pain (burning sensation), right foot worse than eft related to LE arterial insufficiency  She has dry, non-healing ulcers on her toes of both feet   She states she is followed by Dr Monsivais and was just seen on Monday  She is a non smoker, non-drinker  She is edentulous with upper and lower dentures       Past Medical History:  Past Medical History:   Diagnosis Date    Altered mental status     Anticoagulated     Coumadin for Aflutter    Asthma     Atrial flutter ([address] )     maintained on coumadin anticoag    CAD (coronary artery disease)     Candidiasis     Carotid stenosis, bilateral     Cataract     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) ([facility])     Chronic fatigue syndrome     Chronic low back pain     Concussion w loss of consciousness of unsp duration, init     Coronary artery disease     Diabetes mellitus ([facility])     type 2, insulin dependent    DJD (degenerative joint disease)     GERD (gastroesophageal reflux disease)     Herpes zoster     HLD (hyperlipidemia)     HTN (hypertension)     Hyperlipidemia     Hypothyroidism     Irritable bowel syndrome     Lumbar radiculopathy     Lyme disease     Dx in hospital 8/2011    Lyme disease     MI (myocardial infarction)     Migraines     Muscle spasm     Non-neoplastic nevus     Nontoxic multinodular goiter     OA (osteoarthritis)     Osteoarthritis     Other chronic pain     PAD (peripheral artery disease) ([facility])     Palpitations     Pseudogout     Spinal stenosis     Transient cerebral ischemia     Trigger ring finger     Viral gastroenteritis          Past Surgical History:   Past Surgical History:   Procedure Laterality Date    APPENDECTOMY      ARTERIOGRAM  12/19/2017    CARDIAC CATHETERIZATION      CHOLECYSTECTOMY      COLONOSCOPY      CORONARY ARTERY BYPASS GRAFT  2004    LUMBAR LAMINECTOMY      THYROIDECTOMY      TOTAL ABDOMINAL HYSTERECTOMY W/ BILATERAL SALPINGOOPHORECTOMY           Family History:  Family History   Problem Relation Age of Onset    Cancer Mother     Stroke Mother     Cancer Father     Heart disease Father     Breast cancer Sister     Diabetes Daughter      Passed away January 2017          Social History:    History   Alcohol Use No     History   Drug Use No     History   Smoking Status    Never Smoker   Smokeless Tobacco    Never Used       Home Medications:   Prior to Admission medications    Medication Sig Start Date End Date Taking?  Authorizing Provider   albuterol (VENTOLIN HFA) 90 mcg/act inhaler Inhale 108 mcg 2 (two) times a day as needed 2 puffs bid PRN    Historical Provider, MD   aspirin 81 MG tablet Take 81 mg by mouth daily    Historical Provider, MD   atorvastatin (LIPITOR) 80 mg tablet Take 80 mg by mouth daily    Historical Provider, MD   calcium carbonate (TUMS) 500 mg chewable tablet Chew as needed    Historical Provider, MD   cholecalciferol (VITAMIN D3) 1,000 units tablet Take 1,000 mg by mouth daily 10/20/16   Historical Provider, MD   esomeprazole (NexIUM) 20 mg capsule 20 mg daily    Historical Provider, MD   furosemide (LASIX) 40 mg tablet Take 1 tablet (40 mg total) by mouth 2 (two) times a day 3/20/18   Radha Gonzalez DO   LANTUS 100 UNIT/ML subcutaneous injection Inject 30 Units under the skin 2 (two) times a day IN THE MORNING AND 30 UNITS AT BEDTIME 3/28/18   Martin Fisher PA-C   levothyroxine 100 mcg tablet Take 100 mcg by mouth daily    Historical Provider, MD   lisinopril (ZESTRIL) 5 mg tablet Take 5 mg by mouth daily at bedtime    Historical Provider, MD   loratadine (CLARITIN) 10 mg tablet Take 1 tablet (10 mg total) by mouth daily 3/28/18   Martin Fisher PA-C   Magnesium Oxide 400 MG CAPS Take 400 mg by mouth 2 (two) times a day 8/31/16   Historical Provider, MD   metFORMIN (GLUCOPHAGE) 1000 MG tablet 1,000 mg 2 (two) times a day    Historical Provider, MD   metoprolol succinate (TOPROL-XL) 100 mg 24 hr tablet 100 mg daily at bedtime    Historical Provider, MD   metoprolol succinate (TOPROL-XL) 50 mg 24 hr tablet Take 1 tablet by mouth daily with breakfast 9/1/16   Historical Provider, MD Mometasone Furoate [facility] HFA) 100 MCG/ACT AERO Inhale 2 puffs once daily 3/12/18   Fran Dandy, MD   nitroglycerin (NITROSTAT) 0 4 mg SL tablet Place 0 4 mg under the tongue every 3 (three) minutes as needed 5/11/15   Historical Provider, MD   potassium chloride (K-DUR,KLOR-CON) 10 mEq tablet Take 1 tablet (10 mEq total) by mouth 2 (two) times a day 3/20/18   Thomas Loaiza DO   warfarin (COUMADIN) 3 mg tablet Take 3 mg by mouth daily 6 mg tuesdays, 3 mg all other days 6/27/16   Historical Provider, MD       Allergies:   Allergies   Allergen Reactions    Other Chest Pain     IVP-listed as "chest pain" in previous chart, patient stated this occurred "a long time ago" but does not remember exactly occurred     Cephalosporins Chest Pain    Contrast [Iodinated Diagnostic Agents] Other (See Comments)     Flash pulm edema    Doxycycline Chest Pain    Levaquin [Levofloxacin] Chest Pain    Ondansetron Chest Pain     Prolonged QT    Toradol [Ketorolac Tromethamine] Chest Pain       Review of Systems:  Review of Systems - History obtained from chart review and the patient  General ROS: positive for fatigue; negative for fevers, chills, recent weight gain, sleep disturbance, night sweats  Psychological ROS: negative  Hematological and Lymphatic ROS: positive for - bruising  negative for - bleeding problems or blood clots  Respiratory ROS: positive for - shortness of breath  negative for - cough, hemoptysis, orthopnea or wheezing  Cardiovascular ROS: positive for - dyspnea on exertion  negative for - chest pain, orthopnea or paroxysmal nocturnal dyspnea  Gastrointestinal ROS: no abdominal pain, change in bowel habits, or black or bloody stools  Musculoskeletal ROS: negative  Neurological ROS: no TIA or stroke symptoms    Vital Signs:     Vitals:    03/29/18 1200 03/29/18 1253   BP: 102/52 100/52   BP Location: Left arm Right arm   Cuff Size: Adult    Pulse: 88    Resp: 14    Temp: 97 9 °F (36 6 °C)    TempSrc: Oral    SpO2: 97%    Weight: 75 8 kg (167 lb)    Height: 5' 4" (1 626 m)        Physical Exam:    General: Well developed, no acute distress  Awake, Alert and Oriented  HEENT/NECK:  PERRLA  No jugular venous distention  Cardiac:Regular rate and rhythm, 3/6 harsh systolic murmur, loudest at RUSB  Carotids: 1+ pulses, delayed upstrokes, no bruits  Pulmonary:  Breath sounds clear bilaterally  Abdomen:  Non-tender, Non-distended  Positive bowel sounds  Lower extremities: Extremities warm/dry  PT/DP pulses absent bilaterally  Bilateral Femoral pulses 2 +  Trace edema right foot, no edema on the left  L medial great toe and R medial great toe, plantar surface of R 3rd toe and lateral aspect of R 5th toe all with ~3mm dry wounds with overlying eschar and no signs of infection  R great toe with ~4mm skin tear, superficial, dry eschar  Neuro: Alert and oriented X 3  Sensation is grossly intact  No focal deficits  Skin: Warm/Dry, without rashes or lesions      Lab Results:   Lab Results   Component Value Date    WBC 14 90 (H) 03/20/2018    HGB 9 0 (L) 03/20/2018    HCT 29 5 (L) 03/20/2018    MCV 70 (L) 03/20/2018     03/20/2018     Lab Results   Component Value Date    GLUCOSE 190 (H) 03/20/2018    CALCIUM 7 4 (L) 03/20/2018     03/20/2018    K 3 9 03/20/2018    CO2 25 03/20/2018     (H) 03/20/2018    BUN 27 (H) 03/20/2018    CREATININE 0 81 03/20/2018       Results from last 7 days  Lab Units 03/23/18  1447 03/23/18   INR  1 34* 1 34*     Lab Results   Component Value Date    HGBA1C 7 5 (H) 03/17/2018     Lab Results   Component Value Date    CKTOTAL 83 06/09/2017    TROPONINI 2 02 (H) 03/16/2018       Imaging Studies:     Gated CTA of the chest/abdomen/pelvis:   FINDINGS:     VASCULAR STRUCTURES:       Annulus: diameter 31 4 x 20 0 mm      area: 482 2 sq mm    Annulus to LCA: 14 4 mm    Annulus to RCA: 12 9 mm    Minimal diameter right iliofemoral segment: 5 1 mm    Minimal diameter left iliofemoral segment: 6 6 mm     Heart size is enlarged  Aortic valve is moderately calcified  There is also mitral valve annular calcifications  There is no pericardial effusion  There is been prior vein grafting to the left coronary  Atretic appearing possibly occluded LIMA graft   is also seen  The ascending aorta is ectatic measuring 37 mm  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  There is no great vessel stenosis proximally  There is moderate calcification of the aortic arch and descending thoracic   aorta which are normal in caliber  The abdominal aorta is tortuous but normal in caliber  There is a moderate stenosis of the proximal right common iliac artery with the lumen narrows to 5 mm with comparison measurements of 10 mm of the distal common   iliac  Internal iliac arteries are patent bilaterally  There is no significant external or common femoral stenosis  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 with relatively high origin of the medial profunda branch  There is no celiac   stenosis  There is a concentric stenosis of the SMA 1 cm from its origin measuring 40% in severity  No significant renal artery stenosis is seen        3D ROBB:   SUMMARY     LEFT VENTRICLE:  Systolic function was moderately reduced  Ejection fraction was estimated to be 35 %  There was moderate diffuse hypokinesis  Wall thickness was moderately increased  There was moderate concentric hypertrophy      LEFT ATRIUM:  The atrium was markedly dilated      ATRIAL SEPTUM:  No defect or patent foramen ovale was identified by color Doppler      RIGHT ATRIUM:  The atrium was moderately dilated      MITRAL VALVE:  There was marked annular calcification  There was marked diffuse thickening  There was marked calcification of the anterior and posterior leaflets, involving the leaflet base more than the margin  There was moderately reduced leaflet separation  There was moderate stenosis    There was mild to moderate regurgitation  Regurgitation grade was 1-2+ on a scale of 0 to 4+  Mean transmitral gradient was 8 mmHg  The mitral valve area by proximal isovelocity surface area method was 1 42 cm squared  The effective orifice of mitral regurgitation by proximal isovelocity surface area was 0 23 cm squared  The volume of mitral regurgitation by proximal isovelocity surface area was 33 ml      AORTIC VALVE:  The valve was trileaflet  Leaflets exhibited markedly increased thickness, marked calcification, and markedly reduced cuspal separation  There was critical stenosis  LVOT diameter was measured as 22 mm  The aortic annulus are measured 4 23 cm2  The nasima-posterior and horizontal diameters of aortic annulus measured 2 25 and 2 36 cm respectively  The aortic root measured 30  mm at sinus of valsalva and 27 mm at sino-tubular junction  The proximal ascending aorta measured 35 mm  There was mild regurgitation  Valve mean gradient was 30 mmHg  Estimated aortic valve area (by VTI) was 0 46 cm squared  Estimated aortic valve area (by Vmax) was 0 57 cm squared      TRICUSPID VALVE:  There was mild regurgitation  Estimated peak PA pressure was 60 mmHg  The findings suggest moderate pulmonary hypertension      PULMONIC VALVE:  There was trace regurgitation      AORTA:  There was atheroma in the proximal descending aorta  Left and right cardiac catheterization:   Angiographic findings  Native coronary lesions:  ·Mid LAD: Lesion 1: 90 % stenosis  ·D1: Lesion 1: 100 % stenosis  ·D2: Lesion 1: 100 % stenosis  ·D3: Lesion 1: 100 % stenosis  ·OM1: Lesion 1: 100 % stenosis  ·Proximal RCA: Lesion 1: 60 % stenosis    ·RPDA: Lesion 1: 50 % stenosis      Coronary graft lesions:  ·Graft to D1: SVG  · 50 % stenosis at proximal anastomosis, discrete      ·Graft to OM1: SVG  · 100 % stenosis at proximal anastomosis      ·Graft to RPDA: SVG  · 100 % stenosis at proximal anastomosis    Pulmonary function tests: FeV1 1 16 L; 60% predicted  DLCO 40%    Arterial Blood gas: pH 7 45, pO2 75 pCO2 34    Carotid artery ultrasound:  RIGHT:  There is <50% stenosis noted in the internal carotid artery  Plaque is  heterogenous and irregular  Vertebral artery flow is antegrade  There is no significant subclavian artery  disease  LEFT:  There is <50% stenosis noted in the internal carotid artery  Plaque is  heterogenous and irregular  Vertebral artery flow is antegrade  There is no significant subclavian artery  Disease  I have personally reviewed pertinent reports        TAVR evaluation Assessment:     5 Meter Walk Test:      Attempt 1: 9 sec   Attempt 2: 8 sec   Attempt 3: 8 sec    STS Risk Score: 11%      NYHC: III    KCCQ-12 completed    Assessment:  Patient Active Problem List    Diagnosis Date Noted    Acute on chronic diastolic CHF (congestive heart failure) [facility] 03/28/2018    Prolonged Q-T interval on ECG 03/28/2018   [facility] discharge follow-up 03/28/2018    Ulcer of toe of right foot ([address] ) 03/26/2018    Severe aortic stenosis 03/23/2018    Atrial fibrillation with rapid ventricular response ([address] )     Asthma 03/16/2018    Acute cardiogenic pulmonary edema ([address] ) 03/15/2018    Hx of CABG 01/15/2018    Chronic diastolic congestive heart failure ([address] ) 01/15/2018    Persistent atrial fibrillation ([address] ) 01/15/2018    PAD (peripheral artery disease) ([address] ) 12/19/2017    Essential hypertension 12/19/2017    Critical aortic valve stenosis 12/19/2017    CAD (coronary artery disease) 12/19/2017    Type 2 diabetes mellitus with complication, with long-term current use of insulin ([address] ) 12/19/2017    Atrial flutter ([address] ) 12/19/2017    Hyperlipidemia 12/19/2017    Gastroesophageal reflux disease without esophagitis 12/19/2017    Moderate mitral stenosis 12/19/2017    LEONARD (acute kidney injury) ([address] ) 12/19/2017    Asymptomatic carotid artery stenosis, bilateral 11/16/2017    Ulcer of toe of left foot ([address] ) 10/23/2017    Migraine headache 10/18/2017    Degenerative joint disease involving multiple joints 07/28/2017    Atherosclerosis of artery of extremity with ulceration [facility] ) 06/09/2017    Chronic anticoagulation 08/22/2016    Diabetic retinopathy ([facility] ) 08/02/2016    Chronic systolic heart failure ([facility] ) 07/20/2016    Nephrolithiasis 12/17/2015    Osteoarthritis of both knees 11/23/2015    Arthritis of hand 12/26/2014    Postoperative hypothyroidism 10/16/2013     Severe aortic stenosis; plan to schedule TAVR     Plan:    Larisa Reilly has severe symptomatic aortic stenosis  Based on her STS risk assessment, she has undergone testing for transcatheter aortic valve replacement  The results of these studies have been interpreted by two independent cardiac surgeons who have determined the patient to be High risk for open aortic valve replacement  The risks, benefits, and alternatives to TAVR were discussed in detail with the patient today  She understands and wish to proceed with transfemoral transcatheter aortic valve replacement  Informed consent was obtained and a date for the intervention has been set  Larisa Reilly was comfortable with our recommendations, and her questions were answered to her satisfaction  The following preoperative instructions were provided at the conclusion of their consultation:     1  You will receive a phone call from the hospital between 2:00 PM and 8:00 PM the day prior to surgery to confirm arrival time and location  For surgery on Mondays, you will receive a call on Friday  2  Do not drink or eat anything after midnight the night before surgery  That includes no water, candy, gum, lozenges, Lifesavers, etc  We recommend you not eat any salty or fatty snack food, consume alcohol or smoke the night before surgery  3  Continue taking aspirin but only 81 mg daily  4  If you take Coumadin and/or Plavix, discontinue it 5 days before surgery    5  If you are diabetic, do not take any of your diabetic pills the morning of surgery  If you take Lantus insulin, you may take it at your regularly scheduled time the day before surgery  Do not take any other insulins the morning of surgery  6  The 2 nights before surgery, take a shower each night using the special antiseptic soap or soap sponges you received from the office or hospital  Hadley Carls your hair with regular shampoo and rinse completely before using the antiseptic sponges  Use the sponge to wash from your neck down, with special attention to the armpits and groin area  Do not use any other soap or cleanser on your skin  Do not use lotions, powder, deodorant or perfume of any kind on your skin after you shower  Use clean bed linens and wear clean pajamas after your shower  7  You will be prescribed Mupirocin nasal ointment  Apply to both nostrils twice a day for 5 days prior to surgery  8  Do not take a shower the morning of her surgery; you'll be given a special" bath" at the hospital   9  Notify the CT surgery office if you develop a cold, sore throat, cough, fever or other health issues before your surgery  10  Other medication changes included the following: Stop Coumadin 5 days before surgery  Stop Lisinopril and Metformin 3 days before surgery       SIGNATURE: RAMO Waller  DATE: March 29, 2018  TIME: 1:27 PM

## 2018-03-29 NOTE — LETTER
March 29, 2018     Jin Amaya, [address] [facility]  [address]  [address] [address] [address]    Patient: Heather Renner   YOB: 1940   Date of Visit: 3/29/2018       Dear Dr Cuello Query:    Thank you for referring Radhames Muller to me for evaluation  Below are my notes for this consultation  If you have questions, please do not hesitate to call me  I look forward to following your patient along with you  Sincerely,        Brandon White MD        CC: No Recipients  RAMO Woodruff  3/29/2018  1:36 PM  Attested  History & Physical - Cardiothoracic Surgery   Heather Renner [de-identified] y o  female MRN: [id number]    Physician Requesting Consult: Trevor Rondon MD    Reason for Consult / Principal Problem: Aortic stenosis    History of Present Illness: Heather Renner is a [de-identified]y o  year old female who was previously evaluated in our office by RUTH Landaverde  for transcatheter aortic valve replacement on 2/21/2018  During this initial consultation, arrangements were made for the following studies to be completed: Gated CTA of the chest/abdomen/pelvis, 3D ROBB, left and right cardiac catheterization, dental clearance, pulmonary function tests with ABG, and carotid artery ultrasound  Heather Renner now presents to review the results of these tests and obtain a second surgeon to confirm the suitability of proceeding with transcatheter aortic valve replacment  Russ Rascon is referred to us by Dr Trevor Rondon  Her PMH includes CAD/MSTEMI s/p CABG x 6 (2004 at [facility]), HTN, hyperlipidemia, PAD (h/o non-healing L medial great toe ulcer), DM type 2 (insulin), PAF, hypothyroidism, asthma and GERD  She previous had been receiving her medical care through the [facility] network  She was diagnosed with aortic stenosis in 2016  She was admitted to the [address] in December 2017 with flash pulmonary edema and elevated troponin  Her echocardiogram that was performed in March 2017 demonstrated severe aortic stenosis  She had follow up with cardiology after her hospital admission and agreed to surgical consultation  She is symptomatic with progressive SOB/IGNACIO  She also experiences lightheadedness but denies syncope  Her weight a lower extremity edema is stable on an increased dose of Furosemide (40 mg BID)  She denies chest pain, palpitations, PND, orthopnea  She has bilateral foot pain (burning sensation), right foot worse than eft related to LE arterial insufficiency  She has dry, non-healing ulcers on her toes of both feet  She states she is followed by Dr Monsivais and was just seen on Monday  She is a non smoker, non-drinker  She is edentulous with upper and lower dentures       Past Medical History:  Past Medical History:   Diagnosis Date    Altered mental status     Anticoagulated     Coumadin for Aflutter    Asthma     Atrial flutter ([facility])     maintained on coumadin anticoag    CAD (coronary artery disease)     Candidiasis     Carotid stenosis, bilateral     Cataract     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) ([facility])     Chronic fatigue syndrome     Chronic low back pain     Concussion w loss of consciousness of unsp duration, init     Coronary artery disease     Diabetes mellitus ([facility])     type 2, insulin dependent    DJD (degenerative joint disease)     GERD (gastroesophageal reflux disease)     Herpes zoster     HLD (hyperlipidemia)     HTN (hypertension)     Hyperlipidemia     Hypothyroidism     Irritable bowel syndrome     Lumbar radiculopathy     Lyme disease     Dx in hospital 8/2011    Lyme disease     MI (myocardial infarction)     Migraines     Muscle spasm     Non-neoplastic nevus     Nontoxic multinodular goiter     OA (osteoarthritis)     Osteoarthritis     Other chronic pain     PAD (peripheral artery disease) ([facility])     Palpitations     Pseudogout     Spinal stenosis     Transient cerebral ischemia     Trigger ring finger     Viral gastroenteritis          Past Surgical History:   Past Surgical History:   Procedure Laterality Date    APPENDECTOMY      ARTERIOGRAM  12/19/2017    CARDIAC CATHETERIZATION      CHOLECYSTECTOMY      COLONOSCOPY      CORONARY ARTERY BYPASS GRAFT  2004    LUMBAR LAMINECTOMY      THYROIDECTOMY      TOTAL ABDOMINAL HYSTERECTOMY W/ BILATERAL SALPINGOOPHORECTOMY           Family History:  Family History   Problem Relation Age of Onset    Cancer Mother     Stroke Mother     Cancer Father     Heart disease Father     Breast cancer Sister     Diabetes Daughter      Passed away January 2017          Social History:    History   Alcohol Use No     History   Drug Use No     History   Smoking Status    Never Smoker   Smokeless Tobacco    Never Used       Home Medications:   Prior to Admission medications    Medication Sig Start Date End Date Taking?  Authorizing Provider   albuterol (VENTOLIN HFA) 90 mcg/act inhaler Inhale 108 mcg 2 (two) times a day as needed 2 puffs bid PRN    Historical Provider, MD   aspirin 81 MG tablet Take 81 mg by mouth daily    Historical Provider, MD   atorvastatin (LIPITOR) 80 mg tablet Take 80 mg by mouth daily    Historical Provider, MD   calcium carbonate (TUMS) 500 mg chewable tablet Chew as needed    Historical Provider, MD   cholecalciferol (VITAMIN D3) 1,000 units tablet Take 1,000 mg by mouth daily 10/20/16   Historical Provider, MD   esomeprazole (NexIUM) 20 mg capsule 20 mg daily    Historical Provider, MD   furosemide (LASIX) 40 mg tablet Take 1 tablet (40 mg total) by mouth 2 (two) times a day 3/20/18   Vipin Velazquez DO   LANTUS 100 UNIT/ML subcutaneous injection Inject 30 Units under the skin 2 (two) times a day IN THE MORNING AND 30 UNITS AT BEDTIME 3/28/18   Curt Yi PA-C   levothyroxine 100 mcg tablet Take 100 mcg by mouth daily    Historical Provider, MD   lisinopril (ZESTRIL) 5 mg tablet Take 5 mg by mouth daily at bedtime    Historical Provider, MD loratadine (CLARITIN) 10 mg tablet Take 1 tablet (10 mg total) by mouth daily 3/28/18   Arslan Walters PA-C   Magnesium Oxide 400 MG CAPS Take 400 mg by mouth 2 (two) times a day 8/31/16   Historical Provider, MD   metFORMIN (GLUCOPHAGE) 1000 MG tablet 1,000 mg 2 (two) times a day    Historical Provider, MD   metoprolol succinate (TOPROL-XL) 100 mg 24 hr tablet 100 mg daily at bedtime    Historical Provider, MD   metoprolol succinate (TOPROL-XL) 50 mg 24 hr tablet Take 1 tablet by mouth daily with breakfast 9/1/16   Historical Provider, MD   Mometasone Furoate [facility] HFA) 100 MCG/ACT AERO Inhale 2 puffs once daily 3/12/18   Elenore Masker, MD   nitroglycerin (NITROSTAT) 0 4 mg SL tablet Place 0 4 mg under the tongue every 3 (three) minutes as needed 5/11/15   Historical Provider, MD   potassium chloride (K-DUR,KLOR-CON) 10 mEq tablet Take 1 tablet (10 mEq total) by mouth 2 (two) times a day 3/20/18   Khanh Colvin DO   warfarin (COUMADIN) 3 mg tablet Take 3 mg by mouth daily 6 mg tuesdays, 3 mg all other days 6/27/16   Historical Provider, MD       Allergies:   Allergies   Allergen Reactions    Other Chest Pain     IVP-listed as "chest pain" in previous chart, patient stated this occurred "a long time ago" but does not remember exactly occurred     Cephalosporins Chest Pain    Contrast [Iodinated Diagnostic Agents] Other (See Comments)     Flash pulm edema    Doxycycline Chest Pain    Levaquin [Levofloxacin] Chest Pain    Ondansetron Chest Pain     Prolonged QT    Toradol [Ketorolac Tromethamine] Chest Pain       Review of Systems:  Review of Systems - History obtained from chart review and the patient  General ROS: positive for fatigue; negative for fevers, chills, recent weight gain, sleep disturbance, night sweats  Psychological ROS: negative  Hematological and Lymphatic ROS: positive for - bruising  negative for - bleeding problems or blood clots  Respiratory ROS: positive for - shortness of breath  negative for - cough, hemoptysis, orthopnea or wheezing  Cardiovascular ROS: positive for - dyspnea on exertion  negative for - chest pain, orthopnea or paroxysmal nocturnal dyspnea  Gastrointestinal ROS: no abdominal pain, change in bowel habits, or black or bloody stools  Musculoskeletal ROS: negative  Neurological ROS: no TIA or stroke symptoms    Vital Signs:     Vitals:    03/29/18 1200 03/29/18 1253   BP: 102/52 100/52   BP Location: Left arm Right arm   Cuff Size: Adult    Pulse: 88    Resp: 14    Temp: 97 9 °F (36 6 °C)    TempSrc: Oral    SpO2: 97%    Weight: 75 8 kg (167 lb)    Height: 5' 4" (1 626 m)        Physical Exam:    General: Well developed, no acute distress  Awake, Alert and Oriented  HEENT/NECK:  PERRLA  No jugular venous distention  Cardiac:Regular rate and rhythm, 3/6 harsh systolic murmur, loudest at RUSB  Carotids: 1+ pulses, delayed upstrokes, no bruits  Pulmonary:  Breath sounds clear bilaterally  Abdomen:  Non-tender, Non-distended  Positive bowel sounds  Lower extremities: Extremities warm/dry  PT/DP pulses absent bilaterally  Bilateral Femoral pulses 2 +  Trace edema right foot, no edema on the left  L medial great toe and R medial great toe, plantar surface of R 3rd toe and lateral aspect of R 5th toe all with ~3mm dry wounds with overlying eschar and no signs of infection  R great toe with ~4mm skin tear, superficial, dry eschar  Neuro: Alert and oriented X 3  Sensation is grossly intact  No focal deficits  Skin: Warm/Dry, without rashes or lesions      Lab Results:   Lab Results   Component Value Date    WBC 14 90 (H) 03/20/2018    HGB 9 0 (L) 03/20/2018    HCT 29 5 (L) 03/20/2018    MCV 70 (L) 03/20/2018     03/20/2018     Lab Results   Component Value Date    GLUCOSE 190 (H) 03/20/2018    CALCIUM 7 4 (L) 03/20/2018     03/20/2018    K 3 9 03/20/2018    CO2 25 03/20/2018     (H) 03/20/2018    BUN 27 (H) 03/20/2018    CREATININE 0 81 03/20/2018 Results from last 7 days  Lab Units 03/23/18  1447 03/23/18   INR  1 34* 1 34*     Lab Results   Component Value Date    HGBA1C 7 5 (H) 03/17/2018     Lab Results   Component Value Date    CKTOTAL 83 06/09/2017    TROPONINI 2 02 (H) 03/16/2018       Imaging Studies:     Gated CTA of the chest/abdomen/pelvis:   FINDINGS:     VASCULAR STRUCTURES:       Annulus: diameter 31 4 x 20 0 mm      area: 482 2 sq mm    Annulus to LCA: 14 4 mm    Annulus to RCA: 12 9 mm    Minimal diameter right iliofemoral segment: 5 1 mm    Minimal diameter left iliofemoral segment: 6 6 mm     Heart size is enlarged  Aortic valve is moderately calcified  There is also mitral valve annular calcifications  There is no pericardial effusion  There is been prior vein grafting to the left coronary  Atretic appearing possibly occluded LIMA graft   is also seen  The ascending aorta is ectatic measuring 37 mm  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  There is no great vessel stenosis proximally  There is moderate calcification of the aortic arch and descending thoracic   aorta which are normal in caliber  The abdominal aorta is tortuous but normal in caliber  There is a moderate stenosis of the proximal right common iliac artery with the lumen narrows to 5 mm with comparison measurements of 10 mm of the distal common   iliac  Internal iliac arteries are patent bilaterally  There is no significant external or common femoral stenosis  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 with relatively high origin of the medial profunda branch  There is no celiac   stenosis  There is a concentric stenosis of the SMA 1 cm from its origin measuring 40% in severity  No significant renal artery stenosis is seen        3D ROBB:   SUMMARY     LEFT VENTRICLE:  Systolic function was moderately reduced  Ejection fraction was estimated to be 35 %  There was moderate diffuse hypokinesis    Wall thickness was moderately increased  There was moderate concentric hypertrophy      LEFT ATRIUM:  The atrium was markedly dilated      ATRIAL SEPTUM:  No defect or patent foramen ovale was identified by color Doppler      RIGHT ATRIUM:  The atrium was moderately dilated      MITRAL VALVE:  There was marked annular calcification  There was marked diffuse thickening  There was marked calcification of the anterior and posterior leaflets, involving the leaflet base more than the margin  There was moderately reduced leaflet separation  There was moderate stenosis  There was mild to moderate regurgitation  Regurgitation grade was 1-2+ on a scale of 0 to 4+  Mean transmitral gradient was 8 mmHg  The mitral valve area by proximal isovelocity surface area method was 1 42 cm squared  The effective orifice of mitral regurgitation by proximal isovelocity surface area was 0 23 cm squared  The volume of mitral regurgitation by proximal isovelocity surface area was 33 ml      AORTIC VALVE:  The valve was trileaflet  Leaflets exhibited markedly increased thickness, marked calcification, and markedly reduced cuspal separation  There was critical stenosis  LVOT diameter was measured as 22 mm  The aortic annulus are measured 4 23 cm2  The nasima-posterior and horizontal diameters of aortic annulus measured 2 25 and 2 36 cm respectively  The aortic root measured 30  mm at sinus of valsalva and 27 mm at sino-tubular junction  The proximal ascending aorta measured 35 mm  There was mild regurgitation  Valve mean gradient was 30 mmHg  Estimated aortic valve area (by VTI) was 0 46 cm squared  Estimated aortic valve area (by Vmax) was 0 57 cm squared      TRICUSPID VALVE:  There was mild regurgitation  Estimated peak PA pressure was 60 mmHg  The findings suggest moderate pulmonary hypertension      PULMONIC VALVE:  There was trace regurgitation      AORTA:  There was atheroma in the proximal descending aorta      Left and right cardiac catheterization:   Angiographic findings  Native coronary lesions:  ·Mid LAD: Lesion 1: 90 % stenosis  ·D1: Lesion 1: 100 % stenosis  ·D2: Lesion 1: 100 % stenosis  ·D3: Lesion 1: 100 % stenosis  ·OM1: Lesion 1: 100 % stenosis  ·Proximal RCA: Lesion 1: 60 % stenosis  ·RPDA: Lesion 1: 50 % stenosis      Coronary graft lesions:  ·Graft to D1: SVG  · 50 % stenosis at proximal anastomosis, discrete      ·Graft to OM1: SVG  · 100 % stenosis at proximal anastomosis      ·Graft to RPDA: SVG  · 100 % stenosis at proximal anastomosis    Pulmonary function tests:   FeV1 1 16 L; 60% predicted  DLCO 40%    Arterial Blood gas: pH 7 45, pO2 75 pCO2 34    Carotid artery ultrasound:  RIGHT:  There is <50% stenosis noted in the internal carotid artery  Plaque is  heterogenous and irregular  Vertebral artery flow is antegrade  There is no significant subclavian artery  disease  LEFT:  There is <50% stenosis noted in the internal carotid artery  Plaque is  heterogenous and irregular  Vertebral artery flow is antegrade  There is no significant subclavian artery  Disease  I have personally reviewed pertinent reports        TAVR evaluation Assessment:     5 Meter Walk Test:      Attempt 1: 9 sec   Attempt 2: 8 sec   Attempt 3: 8 sec    STS Risk Score: 11%      NYHC: III    KCCQ-12 completed    Assessment:  Patient Active Problem List    Diagnosis Date Noted    Acute on chronic diastolic CHF (congestive heart failure) ([address] ) 03/28/2018    Prolonged Q-T interval on ECG 03/28/2018   [facility] discharge follow-up 03/28/2018    Ulcer of toe of right foot ([facility] ) 03/26/2018    Severe aortic stenosis 03/23/2018    Atrial fibrillation with rapid ventricular response ([address] )     Asthma 03/16/2018    Acute cardiogenic pulmonary edema ([address] ) 03/15/2018    Hx of CABG 01/15/2018    Chronic diastolic congestive heart failure ([address] ) 01/15/2018    Persistent atrial fibrillation ([address] ) 01/15/2018    PAD (peripheral artery disease) ([address] ) 12/19/2017    Essential hypertension 12/19/2017    Critical aortic valve stenosis 12/19/2017    CAD (coronary artery disease) 12/19/2017    Type 2 diabetes mellitus with complication, with long-term current use of insulin ([facility]) 12/19/2017    Atrial flutter ([facility]) 12/19/2017    Hyperlipidemia 12/19/2017    Gastroesophageal reflux disease without esophagitis 12/19/2017    Moderate mitral stenosis 12/19/2017    LEONARD (acute kidney injury) ([facility]) 12/19/2017    Asymptomatic carotid artery stenosis, bilateral 11/16/2017    Ulcer of toe of left foot ([facility]) 10/23/2017    Migraine headache 10/18/2017    Degenerative joint disease involving multiple joints 07/28/2017    Atherosclerosis of artery of extremity with ulceration ([facility]) 06/09/2017    Chronic anticoagulation 08/22/2016    Diabetic retinopathy ([facility]) 08/02/2016    Chronic systolic heart failure ([facility]) 07/20/2016    Nephrolithiasis 12/17/2015    Osteoarthritis of both knees 11/23/2015    Arthritis of hand 12/26/2014    Postoperative hypothyroidism 10/16/2013     Severe aortic stenosis; plan to schedule TAVR     Plan:    Hemanth Ventura has severe symptomatic aortic stenosis  Based on her STS risk assessment, she has undergone testing for transcatheter aortic valve replacement  The results of these studies have been interpreted by two independent cardiac surgeons who have determined the patient to be High risk for open aortic valve replacement  The risks, benefits, and alternatives to TAVR were discussed in detail with the patient today  She understands and wish to proceed with transfemoral transcatheter aortic valve replacement  Informed consent was obtained and a date for the intervention has been set  Hemanth Ventura was comfortable with our recommendations, and her questions were answered to her satisfaction  The following preoperative instructions were provided at the conclusion of their consultation:     1   You will receive a phone call from the hospital between 2:00 PM and 8:00 PM the day prior to surgery to confirm arrival time and location  For surgery on Mondays, you will receive a call on Friday  2  Do not drink or eat anything after midnight the night before surgery  That includes no water, candy, gum, lozenges, Lifesavers, etc  We recommend you not eat any salty or fatty snack food, consume alcohol or smoke the night before surgery  3  Continue taking aspirin but only 81 mg daily  4  If you take Coumadin and/or Plavix, discontinue it 5 days before surgery  5  If you are diabetic, do not take any of your diabetic pills the morning of surgery  If you take Lantus insulin, you may take it at your regularly scheduled time the day before surgery  Do not take any other insulins the morning of surgery  6  The 2 nights before surgery, take a shower each night using the special antiseptic soap or soap sponges you received from the office or hospital  Mirza Mower your hair with regular shampoo and rinse completely before using the antiseptic sponges  Use the sponge to wash from your neck down, with special attention to the armpits and groin area  Do not use any other soap or cleanser on your skin  Do not use lotions, powder, deodorant or perfume of any kind on your skin after you shower  Use clean bed linens and wear clean pajamas after your shower  7  You will be prescribed Mupirocin nasal ointment  Apply to both nostrils twice a day for 5 days prior to surgery  8  Do not take a shower the morning of her surgery; you'll be given a special" bath" at the hospital   9  Notify the CT surgery office if you develop a cold, sore throat, cough, fever or other health issues before your surgery  10  Other medication changes included the following: Stop Coumadin 5 days before surgery  Stop Lisinopril and Metformin 3 days before surgery       SIGNATURE: RAMO Jin  DATE: March 29, 2018  TIME: 1:27 PM  Attestation signed by Leena Robles MD at 3/29/2018  2:08 PM:  The patient is an 57-year-old female with symptomatic severe with aortic stenosis, I explained the diagnosis to the patient, I recommend TAVR  She is at high risk for open surgery based on history of previous CABG and multiple comorbidities  I explained the procedure, benefits, risks and possible complications  The patient understand and agrees to proceed with surgery

## 2018-03-29 NOTE — ASSESSMENT & PLAN NOTE
Patient following with Cardiology  Underwent cardiac catheterization while in the hospital   Scheduled for a heart valve replacement    Will follow Cardiology recommendations

## 2018-03-30 ENCOUNTER — LAB REQUISITION (OUTPATIENT)
Dept: LAB | Facility: HOSPITAL | Age: 78
End: 2018-03-30
Payer: MEDICARE

## 2018-03-30 DIAGNOSIS — I35.0 NONRHEUMATIC AORTIC VALVE STENOSIS: ICD-10-CM

## 2018-03-30 LAB
ABO GROUP BLD: NORMAL
BLD GP AB SCN SERPL QL: NEGATIVE
MRSA NOSE QL CULT: NORMAL
RH BLD: POSITIVE
SPECIMEN EXPIRATION DATE: NORMAL

## 2018-03-31 LAB
ABO GROUP BLD: NORMAL
BLD GP AB SCN SERPL QL: NEGATIVE
RH BLD: POSITIVE
SPECIMEN EXPIRATION DATE: NORMAL

## 2018-04-02 ENCOUNTER — ANESTHESIA EVENT (OUTPATIENT)
Dept: PERIOP | Facility: HOSPITAL | Age: 78
DRG: 267 | End: 2018-04-02
Payer: MEDICARE

## 2018-04-03 ENCOUNTER — APPOINTMENT (OUTPATIENT)
Dept: NON INVASIVE DIAGNOSTICS | Facility: HOSPITAL | Age: 78
DRG: 267 | End: 2018-04-03
Payer: MEDICARE

## 2018-04-03 ENCOUNTER — ANESTHESIA (OUTPATIENT)
Dept: PERIOP | Facility: HOSPITAL | Age: 78
DRG: 267 | End: 2018-04-03
Payer: MEDICARE

## 2018-04-03 ENCOUNTER — APPOINTMENT (INPATIENT)
Dept: RADIOLOGY | Facility: HOSPITAL | Age: 78
DRG: 267 | End: 2018-04-03
Payer: MEDICARE

## 2018-04-03 ENCOUNTER — HOSPITAL ENCOUNTER (INPATIENT)
Facility: HOSPITAL | Age: 78
LOS: 3 days | Discharge: NURSING, REHAB OR LONG-TERM CARE | DRG: 267 | End: 2018-04-06
Attending: THORACIC SURGERY (CARDIOTHORACIC VASCULAR SURGERY) | Admitting: THORACIC SURGERY (CARDIOTHORACIC VASCULAR SURGERY)
Payer: MEDICARE

## 2018-04-03 DIAGNOSIS — Z95.2 S/P TAVR (TRANSCATHETER AORTIC VALVE REPLACEMENT): Primary | ICD-10-CM

## 2018-04-03 DIAGNOSIS — I35.9 NONRHEUMATIC AORTIC VALVE DISORDER: ICD-10-CM

## 2018-04-03 PROBLEM — I44.7 LEFT BUNDLE BRANCH BLOCK (LBBB): Status: ACTIVE | Noted: 2018-04-03

## 2018-04-03 PROBLEM — J95.89 ACUTE RESPIRATORY INSUFFICIENCY, POSTOPERATIVE: Status: ACTIVE | Noted: 2018-04-03

## 2018-04-03 PROBLEM — E87.6 HYPOKALEMIA: Status: ACTIVE | Noted: 2018-04-03

## 2018-04-03 PROBLEM — I44.0 1ST DEGREE AV BLOCK: Status: ACTIVE | Noted: 2018-04-03

## 2018-04-03 PROBLEM — E83.51 HYPOCALCEMIA: Status: ACTIVE | Noted: 2018-04-03

## 2018-04-03 LAB
ANCILLARY VALUES: ABNORMAL
ANION GAP SERPL CALCULATED.3IONS-SCNC: 9 MMOL/L (ref 4–13)
BASE EXCESS BLDA CALC-SCNC: -1 MMOL/L (ref -2–3)
BASE EXCESS BLDA CALC-SCNC: -1 MMOL/L (ref -2–3)
BASE EXCESS BLDA CALC-SCNC: -2 MMOL/L (ref -2–3)
BASE EXCESS BLDA CALC-SCNC: 0 MMOL/L (ref -2–3)
BUN SERPL-MCNC: 26 MG/DL (ref 5–25)
CA-I BLD-SCNC: 0.91 MMOL/L (ref 1.12–1.32)
CA-I BLD-SCNC: 0.92 MMOL/L (ref 1.12–1.32)
CA-I BLD-SCNC: 0.93 MMOL/L (ref 1.12–1.32)
CA-I BLD-SCNC: 0.93 MMOL/L (ref 1.12–1.32)
CALCIUM SERPL-MCNC: 6.7 MG/DL (ref 8.3–10.1)
CHLORIDE SERPL-SCNC: 105 MMOL/L (ref 100–108)
CO2 SERPL-SCNC: 24 MMOL/L (ref 21–32)
CREAT SERPL-MCNC: 1.08 MG/DL (ref 0.6–1.3)
DS:DELIVERY SYSTEM: AC
FIO2 GAS DIL.REBREATH: 45 L
GFR SERPL CREATININE-BSD FRML MDRD: 49 ML/MIN/1.73SQ M
GLUCOSE SERPL-MCNC: 236 MG/DL (ref 65–140)
GLUCOSE SERPL-MCNC: 249 MG/DL (ref 65–140)
GLUCOSE SERPL-MCNC: 259 MG/DL (ref 65–140)
GLUCOSE SERPL-MCNC: 274 MG/DL (ref 65–140)
GLUCOSE SERPL-MCNC: 277 MG/DL (ref 65–140)
GLUCOSE SERPL-MCNC: 308 MG/DL (ref 65–140)
GLUCOSE SERPL-MCNC: 316 MG/DL (ref 65–140)
GLUCOSE SERPL-MCNC: 324 MG/DL (ref 65–140)
GLUCOSE SERPL-MCNC: 347 MG/DL (ref 65–140)
HCO3 BLDA-SCNC: 23.5 MMOL/L (ref 22–28)
HCO3 BLDA-SCNC: 23.9 MMOL/L (ref 22–28)
HCO3 BLDA-SCNC: 24.8 MMOL/L (ref 22–28)
HCO3 BLDA-SCNC: 25.1 MMOL/L (ref 22–28)
HCT VFR BLD AUTO: 27.5 % (ref 34.8–46.1)
HCT VFR BLD AUTO: 29.8 % (ref 34.8–46.1)
HCT VFR BLD CALC: 27 % (ref 34.8–46.1)
HCT VFR BLD CALC: 28 % (ref 34.8–46.1)
HCT VFR BLD CALC: 28 % (ref 34.8–46.1)
HCT VFR BLD CALC: 30 % (ref 34.8–46.1)
HGB BLD-MCNC: 8.5 G/DL (ref 11.5–15.4)
HGB BLD-MCNC: 8.9 G/DL (ref 11.5–15.4)
HGB BLDA-MCNC: 10.2 G/DL (ref 11.5–15.4)
HGB BLDA-MCNC: 9.2 G/DL (ref 11.5–15.4)
HGB BLDA-MCNC: 9.5 G/DL (ref 11.5–15.4)
HGB BLDA-MCNC: 9.5 G/DL (ref 11.5–15.4)
INR PPP: 1.51 (ref 0.86–1.16)
KCT BLD-ACNC: 121 SEC (ref 89–137)
KCT BLD-ACNC: 143 SEC (ref 89–137)
KCT BLD-ACNC: 335 SEC (ref 89–137)
PCO2 BLD: 25 MMOL/L (ref 21–32)
PCO2 BLD: 25 MMOL/L (ref 21–32)
PCO2 BLD: 26 MMOL/L (ref 21–32)
PCO2 BLD: 26 MMOL/L (ref 21–32)
PCO2 BLD: 38 MM HG (ref 36–44)
PCO2 BLD: 44 MM HG (ref 36–44)
PCO2 BLD: 44.3 MM HG (ref 36–44)
PCO2 BLD: 44.4 MM HG (ref 36–44)
PH BLD: 7.34 [PH] (ref 7.35–7.45)
PH BLD: 7.36 [PH] (ref 7.35–7.45)
PH BLD: 7.36 [PH] (ref 7.35–7.45)
PH BLD: 7.4 [PH] (ref 7.35–7.45)
PLATELET # BLD AUTO: 250 THOUSANDS/UL (ref 149–390)
PMV BLD AUTO: 10.4 FL (ref 8.9–12.7)
PO2 BLD: 142 MM HG (ref 75–129)
PO2 BLD: 147 MM HG (ref 75–129)
PO2 BLD: 155 MM HG (ref 75–129)
PO2 BLD: 94 MM HG (ref 75–129)
POTASSIUM BLD-SCNC: 3.9 MMOL/L (ref 3.5–5.3)
POTASSIUM BLD-SCNC: 3.9 MMOL/L (ref 3.5–5.3)
POTASSIUM BLD-SCNC: 4 MMOL/L (ref 3.5–5.3)
POTASSIUM BLD-SCNC: 4.6 MMOL/L (ref 3.5–5.3)
POTASSIUM SERPL-SCNC: 3.9 MMOL/L (ref 3.5–5.3)
POTASSIUM SERPL-SCNC: 4.1 MMOL/L (ref 3.5–5.3)
POTASSIUM SERPL-SCNC: 4.5 MMOL/L (ref 3.5–5.3)
PRESSURE SETTING: 5
PROTHROMBIN TIME: 18.3 SECONDS (ref 12.1–14.4)
RESPIRATORY RATE: 121
SAO2 % BLD FROM PO2: 97 % (ref 95–98)
SAO2 % BLD FROM PO2: 99 % (ref 95–98)
SODIUM BLD-SCNC: 137 MMOL/L (ref 136–145)
SODIUM BLD-SCNC: 139 MMOL/L (ref 136–145)
SODIUM SERPL-SCNC: 138 MMOL/L (ref 136–145)
SPECIMEN SOURCE: ABNORMAL
SPECIMEN SOURCE: NORMAL
VENTILATION VALUE: 430

## 2018-04-03 PROCEDURE — 84295 ASSAY OF SERUM SODIUM: CPT

## 2018-04-03 PROCEDURE — C1894 INTRO/SHEATH, NON-LASER: HCPCS | Performed by: THORACIC SURGERY (CARDIOTHORACIC VASCULAR SURGERY)

## 2018-04-03 PROCEDURE — 33361 REPLACE AORTIC VALVE PERQ: CPT | Performed by: INTERNAL MEDICINE

## 2018-04-03 PROCEDURE — 99222 1ST HOSP IP/OBS MODERATE 55: CPT | Performed by: ANESTHESIOLOGY

## 2018-04-03 PROCEDURE — 84132 ASSAY OF SERUM POTASSIUM: CPT

## 2018-04-03 PROCEDURE — C1769 GUIDE WIRE: HCPCS | Performed by: THORACIC SURGERY (CARDIOTHORACIC VASCULAR SURGERY)

## 2018-04-03 PROCEDURE — 02HV33Z INSERTION OF INFUSION DEVICE INTO SUPERIOR VENA CAVA, PERCUTANEOUS APPROACH: ICD-10-PCS | Performed by: THORACIC SURGERY (CARDIOTHORACIC VASCULAR SURGERY)

## 2018-04-03 PROCEDURE — 85347 COAGULATION TIME ACTIVATED: CPT

## 2018-04-03 PROCEDURE — 85049 AUTOMATED PLATELET COUNT: CPT | Performed by: PHYSICIAN ASSISTANT

## 2018-04-03 PROCEDURE — 84132 ASSAY OF SERUM POTASSIUM: CPT | Performed by: PHYSICIAN ASSISTANT

## 2018-04-03 PROCEDURE — 71045 X-RAY EXAM CHEST 1 VIEW: CPT

## 2018-04-03 PROCEDURE — 93005 ELECTROCARDIOGRAM TRACING: CPT | Performed by: PHYSICIAN ASSISTANT

## 2018-04-03 PROCEDURE — C1760 CLOSURE DEV, VASC: HCPCS | Performed by: THORACIC SURGERY (CARDIOTHORACIC VASCULAR SURGERY)

## 2018-04-03 PROCEDURE — 82330 ASSAY OF CALCIUM: CPT

## 2018-04-03 PROCEDURE — 85014 HEMATOCRIT: CPT | Performed by: PHYSICIAN ASSISTANT

## 2018-04-03 PROCEDURE — 85014 HEMATOCRIT: CPT

## 2018-04-03 PROCEDURE — B3101ZZ FLUOROSCOPY OF THORACIC AORTA USING LOW OSMOLAR CONTRAST: ICD-10-PCS | Performed by: THORACIC SURGERY (CARDIOTHORACIC VASCULAR SURGERY)

## 2018-04-03 PROCEDURE — 33361 REPLACE AORTIC VALVE PERQ: CPT | Performed by: THORACIC SURGERY (CARDIOTHORACIC VASCULAR SURGERY)

## 2018-04-03 PROCEDURE — 76376 3D RENDER W/INTRP POSTPROCES: CPT

## 2018-04-03 PROCEDURE — 85610 PROTHROMBIN TIME: CPT | Performed by: THORACIC SURGERY (CARDIOTHORACIC VASCULAR SURGERY)

## 2018-04-03 PROCEDURE — 82948 REAGENT STRIP/BLOOD GLUCOSE: CPT

## 2018-04-03 PROCEDURE — 82803 BLOOD GASES ANY COMBINATION: CPT

## 2018-04-03 PROCEDURE — 94002 VENT MGMT INPAT INIT DAY: CPT

## 2018-04-03 PROCEDURE — 86923 COMPATIBILITY TEST ELECTRIC: CPT

## 2018-04-03 PROCEDURE — 85018 HEMOGLOBIN: CPT | Performed by: PHYSICIAN ASSISTANT

## 2018-04-03 PROCEDURE — 02RF38Z REPLACEMENT OF AORTIC VALVE WITH ZOOPLASTIC TISSUE, PERCUTANEOUS APPROACH: ICD-10-PCS | Performed by: THORACIC SURGERY (CARDIOTHORACIC VASCULAR SURGERY)

## 2018-04-03 PROCEDURE — 93312 ECHO TRANSESOPHAGEAL: CPT

## 2018-04-03 PROCEDURE — 94760 N-INVAS EAR/PLS OXIMETRY 1: CPT

## 2018-04-03 PROCEDURE — 82947 ASSAY GLUCOSE BLOOD QUANT: CPT

## 2018-04-03 PROCEDURE — 80048 BASIC METABOLIC PNL TOTAL CA: CPT | Performed by: PHYSICIAN ASSISTANT

## 2018-04-03 DEVICE — TAVR SAPIEN 3 CMNDR DLV SYS 26MM: Type: IMPLANTABLE DEVICE | Site: AORTA | Status: FUNCTIONAL

## 2018-04-03 DEVICE — IMPLANTABLE DEVICE: Type: IMPLANTABLE DEVICE | Site: GROIN | Status: FUNCTIONAL

## 2018-04-03 DEVICE — PERCLOSE PROGLIDE™ SUTURE-MEDIATED CLOSURE SYSTEM
Type: IMPLANTABLE DEVICE | Site: GROIN | Status: FUNCTIONAL
Brand: PERCLOSE PROGLIDE™

## 2018-04-03 RX ORDER — CHLORHEXIDINE GLUCONATE 0.12 MG/ML
15 RINSE ORAL ONCE
Status: COMPLETED | OUTPATIENT
Start: 2018-04-03 | End: 2018-04-03

## 2018-04-03 RX ORDER — SODIUM CHLORIDE 9 MG/ML
INJECTION, SOLUTION INTRAVENOUS CONTINUOUS PRN
Status: DISCONTINUED | OUTPATIENT
Start: 2018-04-03 | End: 2018-04-03 | Stop reason: SURG

## 2018-04-03 RX ORDER — SODIUM CHLORIDE, SODIUM LACTATE, POTASSIUM CHLORIDE, CALCIUM CHLORIDE 600; 310; 30; 20 MG/100ML; MG/100ML; MG/100ML; MG/100ML
INJECTION, SOLUTION INTRAVENOUS CONTINUOUS PRN
Status: DISCONTINUED | OUTPATIENT
Start: 2018-04-03 | End: 2018-04-03 | Stop reason: SURG

## 2018-04-03 RX ORDER — ASPIRIN 81 MG/1
81 TABLET, CHEWABLE ORAL DAILY
Status: DISCONTINUED | OUTPATIENT
Start: 2018-04-03 | End: 2018-04-06 | Stop reason: HOSPADM

## 2018-04-03 RX ORDER — PANTOPRAZOLE SODIUM 40 MG/1
40 TABLET, DELAYED RELEASE ORAL
Status: DISCONTINUED | OUTPATIENT
Start: 2018-04-03 | End: 2018-04-06 | Stop reason: HOSPADM

## 2018-04-03 RX ORDER — PROTAMINE SULFATE 10 MG/ML
INJECTION, SOLUTION INTRAVENOUS AS NEEDED
Status: DISCONTINUED | OUTPATIENT
Start: 2018-04-03 | End: 2018-04-03 | Stop reason: SURG

## 2018-04-03 RX ORDER — ONDANSETRON 2 MG/ML
4 INJECTION INTRAMUSCULAR; INTRAVENOUS EVERY 6 HOURS PRN
Status: DISCONTINUED | OUTPATIENT
Start: 2018-04-03 | End: 2018-04-06 | Stop reason: HOSPADM

## 2018-04-03 RX ORDER — SODIUM CHLORIDE, SODIUM GLUCONATE, SODIUM ACETATE, POTASSIUM CHLORIDE, MAGNESIUM CHLORIDE, SODIUM PHOSPHATE, DIBASIC, AND POTASSIUM PHOSPHATE .53; .5; .37; .037; .03; .012; .00082 G/100ML; G/100ML; G/100ML; G/100ML; G/100ML; G/100ML; G/100ML
50 INJECTION, SOLUTION INTRAVENOUS CONTINUOUS
Status: DISCONTINUED | OUTPATIENT
Start: 2018-04-03 | End: 2018-04-04

## 2018-04-03 RX ORDER — CALCIUM CHLORIDE 100 MG/ML
1 INJECTION INTRAVENOUS; INTRAVENTRICULAR ONCE
Status: COMPLETED | OUTPATIENT
Start: 2018-04-03 | End: 2018-04-03

## 2018-04-03 RX ORDER — POLYETHYLENE GLYCOL 3350 17 G/17G
17 POWDER, FOR SOLUTION ORAL DAILY
Status: DISCONTINUED | OUTPATIENT
Start: 2018-04-03 | End: 2018-04-06 | Stop reason: HOSPADM

## 2018-04-03 RX ORDER — LIDOCAINE HYDROCHLORIDE 10 MG/ML
INJECTION, SOLUTION INFILTRATION; PERINEURAL AS NEEDED
Status: DISCONTINUED | OUTPATIENT
Start: 2018-04-03 | End: 2018-04-03 | Stop reason: SURG

## 2018-04-03 RX ORDER — MIDAZOLAM HYDROCHLORIDE 1 MG/ML
INJECTION INTRAMUSCULAR; INTRAVENOUS AS NEEDED
Status: DISCONTINUED | OUTPATIENT
Start: 2018-04-03 | End: 2018-04-03 | Stop reason: SURG

## 2018-04-03 RX ORDER — POTASSIUM CHLORIDE 14.9 MG/ML
20 INJECTION INTRAVENOUS
Status: DISCONTINUED | OUTPATIENT
Start: 2018-04-03 | End: 2018-04-05

## 2018-04-03 RX ORDER — FENTANYL CITRATE 50 UG/ML
50 INJECTION, SOLUTION INTRAMUSCULAR; INTRAVENOUS
Status: DISCONTINUED | OUTPATIENT
Start: 2018-04-03 | End: 2018-04-04

## 2018-04-03 RX ORDER — PROPOFOL 10 MG/ML
INJECTION, EMULSION INTRAVENOUS AS NEEDED
Status: DISCONTINUED | OUTPATIENT
Start: 2018-04-03 | End: 2018-04-03 | Stop reason: SURG

## 2018-04-03 RX ORDER — GLYCOPYRROLATE 0.2 MG/ML
INJECTION INTRAMUSCULAR; INTRAVENOUS AS NEEDED
Status: DISCONTINUED | OUTPATIENT
Start: 2018-04-03 | End: 2018-04-03 | Stop reason: SURG

## 2018-04-03 RX ORDER — DIPHENHYDRAMINE HCL 50 MG
50 CAPSULE ORAL DAILY
COMMUNITY
End: 2018-05-07 | Stop reason: HOSPADM

## 2018-04-03 RX ORDER — ALBUTEROL SULFATE 90 UG/1
2 AEROSOL, METERED RESPIRATORY (INHALATION) 2 TIMES DAILY PRN
Status: DISCONTINUED | OUTPATIENT
Start: 2018-04-03 | End: 2018-04-05

## 2018-04-03 RX ORDER — FENTANYL CITRATE 50 UG/ML
50 INJECTION, SOLUTION INTRAMUSCULAR; INTRAVENOUS ONCE
Status: DISCONTINUED | OUTPATIENT
Start: 2018-04-03 | End: 2018-04-04

## 2018-04-03 RX ORDER — WARFARIN SODIUM 3 MG/1
3 TABLET ORAL
Status: COMPLETED | OUTPATIENT
Start: 2018-04-03 | End: 2018-04-03

## 2018-04-03 RX ORDER — POTASSIUM CHLORIDE 14.9 MG/ML
20 INJECTION INTRAVENOUS ONCE AS NEEDED
Status: COMPLETED | OUTPATIENT
Start: 2018-04-03 | End: 2018-04-03

## 2018-04-03 RX ORDER — LEVOTHYROXINE SODIUM 0.1 MG/1
100 TABLET ORAL
Status: DISCONTINUED | OUTPATIENT
Start: 2018-04-03 | End: 2018-04-06 | Stop reason: HOSPADM

## 2018-04-03 RX ORDER — BISACODYL 10 MG
10 SUPPOSITORY, RECTAL RECTAL DAILY PRN
Status: DISCONTINUED | OUTPATIENT
Start: 2018-04-03 | End: 2018-04-06 | Stop reason: HOSPADM

## 2018-04-03 RX ORDER — HEPARIN SODIUM 1000 [USP'U]/ML
INJECTION, SOLUTION INTRAVENOUS; SUBCUTANEOUS AS NEEDED
Status: DISCONTINUED | OUTPATIENT
Start: 2018-04-03 | End: 2018-04-03 | Stop reason: SURG

## 2018-04-03 RX ORDER — INSULIN GLARGINE 100 [IU]/ML
10 INJECTION, SOLUTION SUBCUTANEOUS
Status: COMPLETED | OUTPATIENT
Start: 2018-04-03 | End: 2018-04-03

## 2018-04-03 RX ORDER — MELATONIN
1000 DAILY
Status: DISCONTINUED | OUTPATIENT
Start: 2018-04-03 | End: 2018-04-06 | Stop reason: HOSPADM

## 2018-04-03 RX ORDER — CHLORHEXIDINE GLUCONATE 0.12 MG/ML
15 RINSE ORAL 2 TIMES DAILY
Status: DISCONTINUED | OUTPATIENT
Start: 2018-04-03 | End: 2018-04-05

## 2018-04-03 RX ORDER — ACETAMINOPHEN 325 MG/1
650 TABLET ORAL EVERY 4 HOURS PRN
Status: DISCONTINUED | OUTPATIENT
Start: 2018-04-03 | End: 2018-04-06 | Stop reason: HOSPADM

## 2018-04-03 RX ORDER — ROCURONIUM BROMIDE 10 MG/ML
INJECTION, SOLUTION INTRAVENOUS AS NEEDED
Status: DISCONTINUED | OUTPATIENT
Start: 2018-04-03 | End: 2018-04-03 | Stop reason: SURG

## 2018-04-03 RX ORDER — MAGNESIUM HYDROXIDE 1200 MG/15ML
LIQUID ORAL AS NEEDED
Status: DISCONTINUED | OUTPATIENT
Start: 2018-04-03 | End: 2018-04-03 | Stop reason: HOSPADM

## 2018-04-03 RX ORDER — NITROGLYCERIN 0.4 MG/1
0.4 TABLET SUBLINGUAL
Status: DISCONTINUED | OUTPATIENT
Start: 2018-04-03 | End: 2018-04-06 | Stop reason: HOSPADM

## 2018-04-03 RX ORDER — PROPOFOL 10 MG/ML
INJECTION, EMULSION INTRAVENOUS CONTINUOUS PRN
Status: DISCONTINUED | OUTPATIENT
Start: 2018-04-03 | End: 2018-04-03 | Stop reason: SURG

## 2018-04-03 RX ORDER — HEPARIN SODIUM 5000 [USP'U]/ML
5000 INJECTION, SOLUTION INTRAVENOUS; SUBCUTANEOUS EVERY 8 HOURS SCHEDULED
Status: DISCONTINUED | OUTPATIENT
Start: 2018-04-04 | End: 2018-04-06 | Stop reason: HOSPADM

## 2018-04-03 RX ORDER — LORATADINE 10 MG/1
10 TABLET ORAL DAILY
Status: DISCONTINUED | OUTPATIENT
Start: 2018-04-03 | End: 2018-04-06 | Stop reason: HOSPADM

## 2018-04-03 RX ORDER — FENTANYL CITRATE 50 UG/ML
INJECTION, SOLUTION INTRAMUSCULAR; INTRAVENOUS AS NEEDED
Status: DISCONTINUED | OUTPATIENT
Start: 2018-04-03 | End: 2018-04-03 | Stop reason: SURG

## 2018-04-03 RX ORDER — ATORVASTATIN CALCIUM 80 MG/1
80 TABLET, FILM COATED ORAL
Status: DISCONTINUED | OUTPATIENT
Start: 2018-04-03 | End: 2018-04-06 | Stop reason: HOSPADM

## 2018-04-03 RX ADMIN — ROCURONIUM BROMIDE 50 MG: 10 INJECTION INTRAVENOUS at 10:02

## 2018-04-03 RX ADMIN — CHLORHEXIDINE GLUCONATE 15 ML: 1.2 RINSE ORAL at 18:06

## 2018-04-03 RX ADMIN — NEOSTIGMINE METHYLSULFATE 4 MG: 1 INJECTION, SOLUTION INTRAMUSCULAR; INTRAVENOUS; SUBCUTANEOUS at 11:51

## 2018-04-03 RX ADMIN — IOHEXOL 45 ML: 350 INJECTION, SOLUTION INTRAVENOUS at 11:31

## 2018-04-03 RX ADMIN — ATORVASTATIN CALCIUM 80 MG: 80 TABLET, FILM COATED ORAL at 16:10

## 2018-04-03 RX ADMIN — SODIUM CHLORIDE: 0.9 INJECTION, SOLUTION INTRAVENOUS at 09:37

## 2018-04-03 RX ADMIN — CEFAZOLIN SODIUM 2000 MG: 2 SOLUTION INTRAVENOUS at 18:06

## 2018-04-03 RX ADMIN — CHLORHEXIDINE GLUCONATE 15 ML: 1.2 RINSE ORAL at 08:36

## 2018-04-03 RX ADMIN — PROPOFOL 30 MCG/KG/MIN: 10 INJECTION, EMULSION INTRAVENOUS at 11:03

## 2018-04-03 RX ADMIN — MUPIROCIN 1 APPLICATION: 20 OINTMENT TOPICAL at 08:36

## 2018-04-03 RX ADMIN — FENTANYL CITRATE 50 MCG: 50 INJECTION, SOLUTION INTRAMUSCULAR; INTRAVENOUS at 09:50

## 2018-04-03 RX ADMIN — INSULIN HUMAN 10 UNITS: 100 INJECTION, SOLUTION PARENTERAL at 11:02

## 2018-04-03 RX ADMIN — GLYCOPYRROLATE 0.8 MG: 0.2 INJECTION, SOLUTION INTRAMUSCULAR; INTRAVENOUS at 11:51

## 2018-04-03 RX ADMIN — PROTAMINE SULFATE 150 MG: 10 INJECTION, SOLUTION INTRAVENOUS at 11:07

## 2018-04-03 RX ADMIN — INSULIN GLARGINE 10 UNITS: 100 INJECTION, SOLUTION SUBCUTANEOUS at 21:22

## 2018-04-03 RX ADMIN — POTASSIUM CHLORIDE 20 MEQ: 200 INJECTION, SOLUTION INTRAVENOUS at 14:17

## 2018-04-03 RX ADMIN — INSULIN LISPRO 3 UNITS: 100 INJECTION, SOLUTION INTRAVENOUS; SUBCUTANEOUS at 16:06

## 2018-04-03 RX ADMIN — FENTANYL CITRATE 50 MCG: 50 INJECTION, SOLUTION INTRAMUSCULAR; INTRAVENOUS at 09:42

## 2018-04-03 RX ADMIN — CEFAZOLIN SODIUM 2000 MG: 2 SOLUTION INTRAVENOUS at 10:10

## 2018-04-03 RX ADMIN — SODIUM CHLORIDE, SODIUM GLUCONATE, SODIUM ACETATE, POTASSIUM CHLORIDE, MAGNESIUM CHLORIDE, SODIUM PHOSPHATE, DIBASIC, AND POTASSIUM PHOSPHATE 50 ML/HR: .53; .5; .37; .037; .03; .012; .00082 INJECTION, SOLUTION INTRAVENOUS at 12:29

## 2018-04-03 RX ADMIN — METOPROLOL TARTRATE 12.5 MG: 25 TABLET ORAL at 08:36

## 2018-04-03 RX ADMIN — CALCIUM CHLORIDE 1 G: 100 INJECTION INTRAVENOUS; INTRAVENTRICULAR at 14:17

## 2018-04-03 RX ADMIN — SODIUM CHLORIDE, SODIUM LACTATE, POTASSIUM CHLORIDE, AND CALCIUM CHLORIDE: .6; .31; .03; .02 INJECTION, SOLUTION INTRAVENOUS at 09:37

## 2018-04-03 RX ADMIN — SODIUM CHLORIDE 5 MG/HR: 0.9 INJECTION, SOLUTION INTRAVENOUS at 12:29

## 2018-04-03 RX ADMIN — SODIUM CHLORIDE 5 MG/HR: 0.9 INJECTION, SOLUTION INTRAVENOUS at 11:03

## 2018-04-03 RX ADMIN — PROPOFOL 50 MG: 10 INJECTION, EMULSION INTRAVENOUS at 10:02

## 2018-04-03 RX ADMIN — MUPIROCIN 1 APPLICATION: 20 OINTMENT TOPICAL at 20:05

## 2018-04-03 RX ADMIN — HEPARIN SODIUM 15000 UNITS: 1000 INJECTION INTRAVENOUS; SUBCUTANEOUS at 10:46

## 2018-04-03 RX ADMIN — INSULIN HUMAN 10 UNITS: 100 INJECTION, SOLUTION PARENTERAL at 10:35

## 2018-04-03 RX ADMIN — WARFARIN SODIUM 3 MG: 3 TABLET ORAL at 18:06

## 2018-04-03 RX ADMIN — MIDAZOLAM HYDROCHLORIDE 1 MG: 1 INJECTION, SOLUTION INTRAMUSCULAR; INTRAVENOUS at 09:42

## 2018-04-03 RX ADMIN — MIDAZOLAM HYDROCHLORIDE 1 MG: 1 INJECTION, SOLUTION INTRAMUSCULAR; INTRAVENOUS at 09:50

## 2018-04-03 RX ADMIN — LIDOCAINE HYDROCHLORIDE 8 ML: 10 INJECTION, SOLUTION INFILTRATION; PERINEURAL at 10:02

## 2018-04-03 NOTE — SOCIAL WORK
MCG Guide Used for Initial Round: TAVR  Optimal GLOS: 3  Hospital Day: 0 days  DC Readiness:   Goal Length of Stay: 3 days postoperative  Note: Goal Length of Stay assumes optimal recovery, decision making, and care  Patients may be discharged to a lower level of care (either later than or sooner than the goal) when it is appropriate for their clinical status and care needs  Discharge Readiness  Return to top of Aortic Valve Replacement, Transcatheter RRG - ISC   Discharge readiness is indicated by patient meeting Recovery Milestones, including ALL of the following:   Hemodynamic stability   Mechanical ventilation absent   Chest pain, dyspnea, or anginal equivalent absent   Dangerous arrhythmia absent   New neurologic deficit absent   Renal function at baseline or acceptable for next level of care   Oxygenation at baseline   Vascular access site without evidence of infection, aneurysm, or growing hematoma   Pacer wires absent   Access sheaths absent   Ambulatory or at baseline   Oral hydration, medications, and diet   Discharge plans and education understood    Identified Barriers: asthma, recent pulmonary edema, hypokalemia, hypocalcemia,, acute resp   Insufficiency, LBBB, first degree AV block      Discussion Date (Time): 04/03/18 with dr Devi Garcia

## 2018-04-03 NOTE — ANESTHESIA PREPROCEDURE EVALUATION
Review of Systems/Medical History  Patient summary reviewed  Chart reviewed  No history of anesthetic complications     Cardiovascular  EKG reviewed, Exercise tolerance: good,  Hyperlipidemia, Hypertension on > 1 medication, Valvular heart disease , aortic valve stenosis and mitral valve stenosis, Past MI > 6 months, CAD, CAD status: 3VD and native vessel or graft, History of CABG, Dysrhythmias, atrial fibrillation, CHF compensated CHF, PVD,   Pulmonary hypertension Pulmonary  Asthma: well controlled/ stable Last rescue: < 1 week ago Asthma type of rescue: PRN inhaler,        GI/Hepatic    GERD well controlled,        Kidney stones,        Endo/Other  Diabetes poorly controlled type 2 , History of thyroid disease , hypothyroidism,      GYN       Hematology   Musculoskeletal    Arthritis     Neurology    Neuromuscular disease , TIA, Headaches,    Psychology           Physical Exam    Airway    Mallampati score: I  TM Distance: >3 FB  Neck ROM: full     Dental   upper dentures and lower dentures,     Cardiovascular  Cardiovascular exam normal    Pulmonary  Pulmonary exam normal     Other Findings        Lab Results   Component Value Date    WBC 8 88 03/29/2018    HGB 10 2 (L) 03/29/2018     03/29/2018     Lab Results   Component Value Date     03/29/2018    K 4 7 03/29/2018    BUN 29 (H) 03/29/2018    CREATININE 0 98 03/29/2018    GLUCOSE 190 (H) 03/20/2018     Lab Results   Component Value Date    PTT 31 01/31/2014      Lab Results   Component Value Date    INR 2 37 (H) 03/29/2018       Blood type O    Lab Results   Component Value Date    HGBA1C 7 5 (H) 03/17/2018       ECHO:  LEFT VENTRICLE:  Systolic function was moderately reduced  Ejection fraction was estimated to be 35 %  There was moderate diffuse hypokinesis  Wall thickness was moderately increased    There was moderate concentric hypertrophy      LEFT ATRIUM:  The atrium was markedly dilated      ATRIAL SEPTUM:  No defect or patent foramen ovale was identified by color Doppler      RIGHT ATRIUM:  The atrium was moderately dilated      MITRAL VALVE:  There was marked annular calcification  There was marked diffuse thickening  There was marked calcification of the anterior and posterior leaflets, involving the leaflet base more than the margin  There was moderately reduced leaflet separation  There was moderate stenosis  There was mild to moderate regurgitation  Regurgitation grade was 1-2+ on a scale of 0 to 4+  Mean transmitral gradient was 8 mmHg  The mitral valve area by proximal isovelocity surface area method was 1 42 cm squared  The effective orifice of mitral regurgitation by proximal isovelocity surface area was 0 23 cm squared  The volume of mitral regurgitation by proximal isovelocity surface area was 33 ml      AORTIC VALVE:  The valve was trileaflet  Leaflets exhibited markedly increased thickness, marked calcification, and markedly reduced cuspal separation  There was critical stenosis  LVOT diameter was measured as 22 mm  The aortic annulus are measured 4 23 cm2  The nasima-posterior and horizontal diameters of aortic annulus measured 2 25 and 2 36 cm respectively  The aortic root measured 30  mm at sinus of valsalva and 27 mm at sino-tubular junction  The proximal ascending aorta measured 35 mm  There was mild regurgitation  Valve mean gradient was 30 mmHg  Estimated aortic valve area (by VTI) was 0 46 cm squared  Estimated aortic valve area (by Vmax) was 0 57 cm squared      TRICUSPID VALVE:  There was mild regurgitation  Estimated peak PA pressure was 60 mmHg  The findings suggest moderate pulmonary hypertension      PULMONIC VALVE:  There was trace regurgitation      AORTA:  There was atheroma in the proximal descending aorta        CATH:  CORONARY CIRCULATION:  Mid LAD: There was a 90 % stenosis  1st diagonal: There was a 100 % stenosis at the ostium of the vessel segment    2nd diagonal: There was a 100 % stenosis at the ostium of the vessel segment  3rd diagonal: There was a 100 % stenosis at the ostium of the vessel segment  1st obtuse marginal: There was a 100 % stenosis at the ostium of the vessel segment  Proximal RCA: There was a 60 % stenosis  The lesion was hazy and ulcerated  It appears amenable to percutaneous intervention  Right PDA: There was a 50 % stenosis  Graft to the 1st diagonal: There was a discrete 50 % stenosis at the proximal anastomosis  It appears amenable to percutaneous intervention  Graft to the 1st obtuse marginal: There was a 100 % stenosis at the proximal anastomosis  Graft to the RPDA: There was a 100 % stenosis at the proximal anastomosis  Anesthesia Plan  ASA Score- 4     Anesthesia Type- general with ASA Monitors  Additional Monitors:   Airway Plan:     Comment: General anesthesia, endotracheal Intubation  Standard ASA monitors  Large bore peripheral IVs  Pre-induction arterial line  CVP (Triple Lumen) +/- Introducer Catheter with PAC  ROBB for perioperative monitoring and diagnosis  Post-op ICU/vent  I, Dr Tammy Howard, the attending physician, has personally seen and evaluated the patient prior to anesthetic care  I have reviewed the pre-anesthetic record, and other medical records if appropriate to the anesthetic care  Risks and benefits discussed with patient; patient consented and agrees to proceed  If a CRNA is involved in the case, I have reviewed the CRNA assessment, if present, and agree  The patient is in a suitable condition to proceed with my formulated anesthetic plan        Plan Factors-  Patient did not smoke on day of surgery  Induction- intravenous  Postoperative Plan-     Informed Consent- Anesthetic plan and risks discussed with patient  I personally reviewed this patient with the CRNA  Discussed and agreed on the Anesthesia Plan with the CRNA  Christi Moraes

## 2018-04-03 NOTE — RESPIRATORY THERAPY NOTE
RT Ventilator Management Note  Gabrielle Davis [de-identified] y o  female MRN: [id number]  Unit/Bed#: [facility] 417-01 Encounter: [id number]      Daily Screen       4/3/2018 1220 4/3/2018 1255          Patient safety screen outcome[de-identified] Passed -      Spont breathing trial % for 30 min: Yes -      Spont breathing trial outcome[de-identified] - Passed      Name of Medical Team Notified[de-identified] - Demetrius Born, PA-C      Preparing to extubate/ Notify Nurse: - Yes      Extubation order obtained: - Yes      Consider Cuff Test: - Yes      Patient extubated: - Yes      RSBI: - 37              Physical Exam:   Assessment Type: Assess only  General Appearance: Alert, Awake  Respiratory Pattern: Normal  Chest Assessment: Chest expansion symmetrical  Bilateral Breath Sounds: Clear, Diminished  Suction: ET Tube, Oral      Resp Comments: Pt was extubated at this time and placed on a 3L NC  Prior to extubation pt had a possitive cuff leak and an RSBI of 37  Once extubated pt was able to use voice and no stridor was heard  Will continue to monitor throughout shift

## 2018-04-03 NOTE — RESPIRATORY THERAPY NOTE
RT Protocol Note  Curtis Large [de-identified] y o  female MRN: [id number]  Unit/Bed#: OR POOL Encounter: [id number]    Assessment    Principal Problem:    Severe aortic stenosis      Home Pulmonary Medications:  Albuterol MDI      Past Medical History:   Diagnosis Date    Altered mental status     Anticoagulated     Coumadin for Aflutter    Asthma     Atrial flutter (HCC)     maintained on coumadin anticoag    CAD (coronary artery disease)     Candidiasis     Carotid stenosis, bilateral     Cataract     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) (formerly [facility])     Chronic fatigue syndrome     Chronic low back pain     Concussion w loss of consciousness of unsp duration, init     Coronary artery disease     Diabetes mellitus (formerly [facility])     type 2, insulin dependent    DJD (degenerative joint disease)     GERD (gastroesophageal reflux disease)     Herpes zoster     HLD (hyperlipidemia)     HTN (hypertension)     Hyperlipidemia     Hypothyroidism     Irritable bowel syndrome     Lumbar radiculopathy     Lyme disease     Dx in hospital 8/2011    Lyme disease     MI (myocardial infarction)     Migraines     Muscle spasm     Non-neoplastic nevus     Nontoxic multinodular goiter     OA (osteoarthritis)     Osteoarthritis     Other chronic pain     PAD (peripheral artery disease) (formerly [facility])     Palpitations     Pseudogout     Spinal stenosis     Transient cerebral ischemia     Trigger ring finger     Viral gastroenteritis      Social History     Social History    Marital status:       Spouse name: N/A    Number of children: N/A    Years of education: N/A     Social History Main Topics    Smoking status: Never Smoker    Smokeless tobacco: Never Used    Alcohol use No    Drug use: No    Sexual activity: No     Other Topics Concern    None     Social History Narrative    None       Subjective         Objective    Physical Exam:   Assessment Type: (P) Assess only  General Appearance: (P) Sedated  Respiratory Pattern: (P) Assisted  Chest Assessment: (P) Chest expansion symmetrical  Bilateral Breath Sounds: (P) Coarse    Vitals:  Blood pressure 140/55, pulse (P) 84, temperature (!) 96 4 °F (35 8 °C), temperature source Probe, resp  rate 14, height 5' 4" (1 626 m), weight 75 3 kg (166 lb), SpO2 (P) 98 %, not currently breastfeeding  Imaging and other studies: I have personally reviewed pertinent reports  Plan    Respiratory Plan: (P) Home Bronchodilator Patient pathway, Vent/NIV/HFNC  Airway Clearance Plan: Incentive Spirometer     Resp Comments: (P) Pt was received at this time from the OR  and placed on vent with AC/V C settings  No resp distress noted, ETT found at the 20 from the lip, bilateral BS heard  Chart was reviewed per resp/airway clearance protocol  Pt has a HX of asthma and is ordered on a PRN MDI  Per airway clearance protocol pt will be started on IS post extubation as ordered  Will continue to monitor throughout shift

## 2018-04-03 NOTE — OP NOTE
OPERATIVE REPORT  PATIENT NAME: Tom Bui    :  1940  MRN: [id number]  Pt Location: [facility] OR ROOM 02    SURGERY DATE: 4/3/2018    SURGEON: Kenia Glez MD    CO-SURGEON: Zeferino Husain MD    ASSISTANT: Dino Aase, DO    ADDITIONAL ASSISTANT: Syed Prieto PA-C    PREOPERATIVE DIAGNOSIS: Symptomatic severe aortic stenosis  POSTOPERATIVE DIAGNOSIS: Symptomatic severe aortic stenosis  NYHA Class: 3  CCS Class: 1    PROCEDURE:   Transcatheter aortic valve replacement with a 26 mm Cervantes YEVGENIY 3 bioprosthetic valve via left transfemoral approach  CARDIOPULMONARY BYPASS TIME: 0 minutes  ANESTHESIA Agatha Ly Covert, general endotracheal anesthesia with transesophageal echocardiogram guidance  INDICATIONS:  The patient is a [de-identified]y o  year-old female with clinical and echocardiographic findings consistent with severe aortic stenosis  The patient was evaluated in our heart valve center and was deemed high risk for a conventional aortic valve replacement therefore we recommended the procedure described previously  FINDINGS:  1  Intraoperative transesophageal echocardiogram revealed severe aortic stenosis  2  Final transesophageal echocardiogram demonstrated prosthetic valve with normal function trace perivalvular leak  OPERATIVE TECHNIQUE:    The patient was taken to the operating room and placed supine on the operating table  Following the satisfactory induction of general anesthesia and placement of monitoring lines, the patient was prepped and draped in the usual sterile fashion  A time-out procedure was performed  The right common femoral artery and right common femoral vein were accessed percutaneously using Seldinger technique and fluoroscopy  A pigtail catheter was inserted and advanced to the right coronary cusp, an aortogram was performed to determine proper angle and orientation for valve deployment    Through the right common femoral vein sheath, a balloon-tip temporary pacing catheter was inserted and advanced into the right ventricle and its capture was confirmed  The left common femoral artery was accessed percutaneously using Seldinger technique and fluoroscopy  Two (2) Perclose sutures were deployed in the standard fashion  The patient was systemically heparinized  The left common femoral artery was then accessed, a eÃ“tica extra-stiff wire was positioned in the ascending aorta over an exchange catheter  The sheath for the delivery system was inserted through the left common femoral artery and advanced into the aorta  A 6 Finnish Rachel right 4 coronary catheter was advanced through the delivery sheath to the aortic valve  The aortic valve was crossed with a 0 035 straight-tip wire, the right coronary catheter was advanced into the left ventricular cavity, this was then exchanged for a curved tip extra stiff wire  The balloon valvuloplasty catheter was inserted through the delivery sheath and positioned in the aortic annulus  During an episode of rapid pacing, balloon valvuloplasty was performed  A 26 mm YEVGENIY 3 valve delivery system was advanced through the delivery sheath into the aorta, the delivery system was configured for deployment  The valve on the delivery system was then advanced and the aortic valve was crossed  At this point, the catheter was desheathed in the standard fashion  The valve was positioned appropriately using a combination of fluoroscopy and transesophageal echocardiogram guidance  During an episode of rapid pacing, balloon deployment of the valve was performed  Following deployment, the position of the valve was confirmed by fluoroscopy and echocardiography and its position appeared appropriate with trace perivalvular leak  The valve delivery system was subsequently removed followed by removal of the sheath while the Perclose sutures were secured and direct pressure was held   Protamine was administered with normalization of the ACT  Pressure was released, without evidence of active bleeding  The right common femoral artery sheath was removed followed by deployment of a closure device  The right common femoral vein sheath was removed and pressure was held  COMPLICATIONS: none    PACKS/TUBES/DRAINS: none  EBL: 50 cc  TRANSFUSION: none  SPECIMENS: None      As the attending surgeon, I was present and scrubbed for all critical portions of this procedure  There was no qualified surgical resident available  Sponge, needle and instrument counts were reported as correct by the nursing staff       SIGNATURE: Freddie Dela Cruz MD  DATE: April 3, 2018  TIME: 11:19 AM

## 2018-04-03 NOTE — CONSULTS
[id number]  1962 [facility] [de-identified] y o  female MRN: [id number]  Unit/Bed#: [facility] 417-01 Encounter: [id number]      Physician Requesting Consult: Dino Aase, DO    Reason for Consult / Principal Problem: Critical aortic valve stenosis    HPI: Tom Bui is a [de-identified] y o  female who presents to Dr Rosa Rodriguez office for consultation regarding critical AS  She was felt to be a high risk candidate for traditional AVR and was worked up for TAVR  The patient presents today for elective TAVR  She presents to the ICU post-op day # 0 in stable condition  She has a PMHx including aortic stenosis, mitral stenosis, CAD with CABG x 6 in 2004, LBBB, HTN, hyperlipidemia, diabetes mellitus type Ii, bilateral carotid artery disease, atrial flutter s/p cardioversion on Coumadin, PAD with bilateral LE ulcers, chronic low back pain, DJD, GERD, hypothyroidism, IBS, Lyme disease, migraine, osteoarthritis, and spinal stenosis  She was evaluated as an outpatient in 2/2018 by Dr Isadora Gilmore for her now critical aortic stenosis  Her most recent ROBB (prior to surgery) on 3/19/18 displayed LVEF 35%, moderate LVH, normal RV systolic function, moderate MS with mild-moderate MR, critical AS with mild AI, and PA systolic pressure of 43LVOV  Her symptoms include progressive IGNACIO and pre-syncope with exertion  Note that she was admitted in December 2017 for flash pulmonary edema  She was also admitted on 3/15/18 for acute hypoxic respiratory failure and CHF exacerbation after receiving contrast dye for her pre-op CTA although she was appropriately pre-treated for her allergy  Suspect this reaction was due more to volume load in the setting of critical AS and severe MS  She is admitted today to the service of Dr Isadora Gilmore for elective TAVR  History obtained from chart review due to patient being intubated and sedated       PMH:   Past Medical History:   Diagnosis Date    Altered mental status     Anticoagulated Coumadin for Aflutter    Asthma     Atrial flutter ([address] )     maintained on coumadin anticoag    CAD (coronary artery disease)     Candidiasis     Carotid stenosis, bilateral     Cataract     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) ([facility])     Chronic fatigue syndrome     Chronic low back pain     Concussion w loss of consciousness of unsp duration, init     Coronary artery disease     Diabetes mellitus (HCC)     type 2, insulin dependent    DJD (degenerative joint disease)     GERD (gastroesophageal reflux disease)     Herpes zoster     HLD (hyperlipidemia)     HTN (hypertension)     Hyperlipidemia     Hypothyroidism     Irritable bowel syndrome     Lumbar radiculopathy     Lyme disease     Dx in hospital 8/2011    Lyme disease     MI (myocardial infarction)     Migraines     Muscle spasm     Non-neoplastic nevus     Nontoxic multinodular goiter     OA (osteoarthritis)     Osteoarthritis     Other chronic pain     PAD (peripheral artery disease) ([facility])     Palpitations     Pseudogout     Spinal stenosis     Transient cerebral ischemia     Trigger ring finger     Viral gastroenteritis        PSH:   Past Surgical History:   Procedure Laterality Date    APPENDECTOMY      ARTERIOGRAM  12/19/2017    CARDIAC CATHETERIZATION      CHOLECYSTECTOMY      COLONOSCOPY      CORONARY ARTERY BYPASS GRAFT  2004    LUMBAR LAMINECTOMY      THYROIDECTOMY      TOTAL ABDOMINAL HYSTERECTOMY W/ BILATERAL SALPINGOOPHORECTOMY         Family History:   Family History   Problem Relation Age of Onset    Cancer Mother     Stroke Mother     Cancer Father     Heart disease Father     Breast cancer Sister     Diabetes Daughter      Passed away January 2017        Social History:   History   Smoking Status    Never Smoker   Smokeless Tobacco    Never Used      History   Alcohol Use No      Marital Status:      ROS: ROS unable to be obtained secondary to intubation and sedation  Allergies: Allergies   Allergen Reactions    Other Chest Pain     IVP-listed as "chest pain" in previous chart, patient stated this occurred "a long time ago" but does not remember exactly occurred     Cephalosporins Chest Pain    Contrast [Iodinated Diagnostic Agents] Other (See Comments)     Flash pulm edema    Doxycycline Chest Pain    Levaquin [Levofloxacin] Chest Pain    Ondansetron Chest Pain     Prolonged QT    Toradol [Ketorolac Tromethamine] Chest Pain       Home Medications:   Prior to Admission medications    Medication Sig Start Date End Date Taking?  Authorizing Provider   albuterol (VENTOLIN HFA) 90 mcg/act inhaler Inhale 108 mcg 2 (two) times a day as needed 2 puffs bid PRN   Yes Historical Provider, MD   aspirin 81 MG tablet Take 81 mg by mouth daily   Yes Historical Provider, MD   atorvastatin (LIPITOR) 80 mg tablet Take 80 mg by mouth daily   Yes Historical Provider, MD   cholecalciferol (VITAMIN D3) 1,000 units tablet Take 1,000 Units by mouth daily   10/20/16  Yes Historical Provider, MD   diphenhydrAMINE (BANOPHEN) 50 mg capsule Take 50 mg by mouth daily   Yes Historical Provider, MD   esomeprazole (NexIUM) 20 mg capsule Take 20 mg by mouth daily in the early morning     Yes Historical Provider, MD   famotidine (PEPCID) 20 mg tablet TAKE 1 TABLET THE NIGHT BEFORE THE PROCEDURE & 1 TABLET THE MORNING OF THE PROCEDURE 3/29/18  Yes RAMO Vega   furosemide (LASIX) 40 mg tablet Take 1 tablet (40 mg total) by mouth 2 (two) times a day 3/20/18  Yes Jacek Galloway DO   LANTUS 100 UNIT/ML subcutaneous injection Inject 30 Units under the skin 2 (two) times a day IN THE MORNING AND 30 UNITS AT BEDTIME 3/28/18  Yes Viry Ambrosio PA-C   levothyroxine 100 mcg tablet Take 100 mcg by mouth daily   Yes Historical Provider, MD   lisinopril (ZESTRIL) 5 mg tablet Take 5 mg by mouth daily at bedtime   Yes Historical Provider, MD   loratadine (CLARITIN) 10 mg tablet Take 1 tablet (10 mg total) by mouth daily 3/28/18  Yes Ashley Armstrong PA-C   Magnesium Oxide 400 MG CAPS Take 400 mg by mouth 2 (two) times a day 8/31/16  Yes Historical Provider, MD   metoprolol succinate (TOPROL-XL) 100 mg 24 hr tablet 100 mg daily at bedtime   Yes Historical Provider, MD   metoprolol succinate (TOPROL-XL) 50 mg 24 hr tablet Take 1 tablet by mouth daily with breakfast 9/1/16  Yes Historical Provider, MD   Mometasone Furoate [facility] HFA) 100 MCG/ACT AERO Inhale 2 puffs once daily 3/12/18  Yes Siva Regan MD   mupirocin (BACTROBAN) 2 % ointment Apply 1 application topically 2 (two) times a day for 5 days Apply to each nostril twice daily for five days before your operation   3/29/18 4/3/18 Yes RAMO Tran   nitroglycerin (NITROSTAT) 0 4 mg SL tablet Place 0 4 mg under the tongue every 3 (three) minutes as needed 5/11/15  Yes Historical Provider, MD   potassium chloride (K-DUR,KLOR-CON) 10 mEq tablet Take 1 tablet (10 mEq total) by mouth 2 (two) times a day 3/20/18  Yes Mark Rinaldi DO   PREDNISONE PO Take 60 mg by mouth daily   Yes Historical Provider, MD   calcium carbonate (TUMS) 500 mg chewable tablet Chew 2 tablets 3 (three) times a day      Historical Provider, MD   metFORMIN (GLUCOPHAGE) 1000 MG tablet 1,000 mg 2 (two) times a day    Historical Provider, MD   warfarin (COUMADIN) 3 mg tablet Take 3 mg by mouth daily 6 mg Sat and Sun, 3 mg all other days  6/27/16   Historical Provider, MD       Inpatient Medications:  Scheduled Meds:    Current Facility-Administered Medications:  acetaminophen 650 mg Rectal Q4H PRN Amando Hernandez PA-C    acetaminophen 650 mg Oral Q4H PRN Amando Hernandez PA-C    albuterol 2 puff Inhalation BID PRN Omid Mcbride PA-C    aspirin 81 mg Oral Daily Amando Hernandez PA-C    atorvastatin 80 mg Oral Daily With KeySMAGDALENO lindsay    bisacodyl 10 mg Rectal Daily PRN Amando Hernandez PA-C    calcium chloride 1 g Intravenous Once M D C  HoldingsMAGDALENO    cefazolin 2,000 mg Intravenous Q8H Amando Hernandez PA-C    chlorhexidine 15 mL Swish & Spit BID Amando Hernandez PA-C    cholecalciferol 1,000 Units Oral Daily Amando Hernandez PA-C    fentanyl citrate (PF) 50 mcg Intravenous Once M D C  Holdings, MAGDALENO    fentanyl citrate (PF) 50 mcg Intravenous Q1H PRN M D C  HoldingsMAGDALENO    [START ON 4/4/2018] heparin (porcine) 5,000 Units Subcutaneous Q8H [address] Amando Hernandez PA-C    insulin lispro 1-6 Units Subcutaneous TID AC Amando Hernandez PA-C    levothyroxine 100 mcg Oral Early Morning Amando Hernandez PA-C    loratadine 10 mg Oral Daily M D C  HoldingsMAGDALENO    multi-electrolyte 50 mL/hr Intravenous Continuous M D C  HoldingsMAGDALENO Last Rate: 50 mL/hr (04/03/18 1229)   mupirocin 1 application Nasal [address] Amando Hernandez PA-C    niCARdipine 2 5-15 mg/hr Intravenous Titrated M D C  HoldingsMAGDALENO Last Rate: 5 mg/hr (04/03/18 1229)   nitroglycerin 0 4 mg Sublingual Q3 min PRN Amando Hernandez PA-C    ondansetron 4 mg Intravenous Q6H PRN Amando Hernandez PA-C    pantoprazole 40 mg Oral Early Morning Amando Hernandez PA-C    polyethylene glycol 17 g Oral Daily Amando Hernandez PA-C    potassium chloride 20 mEq Intravenous Once PRN Amando Hernandez PA-C    potassium chloride 20 mEq Intravenous Q1H PRN Amando Hernandez PA-C    potassium chloride 20 mEq Intravenous Q30 Min PRN Amando Hernandez PA-C    warfarin 3 mg Oral Once (warfarin) Amando Hernandez PA-C      Continuous Infusions:    multi-electrolyte 50 mL/hr Last Rate: 50 mL/hr (04/03/18 1229)   niCARdipine 2 5-15 mg/hr Last Rate: 5 mg/hr (04/03/18 1229)     PRN Meds:    acetaminophen 650 mg Q4H PRN   acetaminophen 650 mg Q4H PRN   albuterol 2 puff BID PRN   bisacodyl 10 mg Daily PRN   fentanyl citrate (PF) 50 mcg Q1H PRN   nitroglycerin 0 4 mg Q3 min PRN   ondansetron 4 mg Q6H PRN   potassium chloride 20 mEq Once PRN   potassium chloride 20 mEq Q1H PRN   potassium chloride 20 mEq Q30 Min PRN       VTE Pharmacologic Prophylaxis: Heparin  VTE Mechanical Prophylaxis: sequential compression device    Invasive lines and devices: Invasive Devices     Central Venous Catheter Line            CVC Central Lines 18 Triple less than 1 day          Arterial Line            Arterial Line 18 Left Brachial less than 1 day          Drain            Urethral Catheter Non-latex; Temperature probe 16 Fr  less than 1 day                Vitals:   Vitals:    18 1149 18 1220 18 1255 18 1300   BP: 140/55      Pulse: 84 79 83    Resp: 14      Temp: (!) 96 4 °F (35 8 °C)      TempSrc: Probe   Probe   SpO2: 98% 98% 98%    Weight:       Height:                 Temperature: Temp (24hrs), Av 6 °F (36 4 °C), Min:96 4 °F (35 8 °C), Max:98 7 °F (37 1 °C)  Current: Temperature: (!) 96 4 °F (35 8 °C)    Weights: IBW: 54 7 kg  Body mass index is 28 49 kg/m²  Respiratory/Ventilator Settings:   Vent Mode: CPAP/PS Spont  Resp Rate (BPM): 12 BPM  Vt (mL): 430 mL  FIO2 (%): 45 %  PEEP (cmH2O): 5 cmH2O  SpO2: 98 %    Physical Exam:    Constitutional: Intubated, sedated  HENT: Atraumatic  Intubated  Neck: Neck supple  Patient is intubated  Cardiovascular: Normal rate and regular rhythm  Pulmonary/Chest: Breath sounds clear bilaterally  No wheezes or rales  Abdominal: Soft  No distension  B/L groins without significant hematoma  Musculoskeletal: No edema  Ischemic-appearing ulcerations of the bilateral great toes, R third toe, and L third toe  Neurological: Patient is sedated  Skin: Skin is warm and dry  Psychiatric: Unable to assess as patient is sedated and intubated  PV: Palpable DP pulse on LEFT and palpable DP pulse on RIGHT       Labs:     Results from last 7 days  Lab Units 18  1213 18  1210 18  1117  18  0924   WBC Thousand/uL  --   --   --   --  8 88   HEMOGLOBIN g/dL  --  8 9*  --   --  10 2*   I STAT HEMOGLOBIN g/dl 9 2*  --  9 5*  < >  --    HEMATOCRIT %  --  29 8*  --   --  34 1*   PLATELETS Thousands/uL  --  250  --   --  280   NEUTROS PCT %  --   --   --   --  63   MONOS PCT %  --   --   --   --  10   < > = values in this interval not displayed  Results from last 7 days  Lab Units 18  1213 18  1210 18  1117  18  0924   SODIUM mmol/L  --  138  --   --  137   POTASSIUM mmol/L  --  3 9  --   --  4 7   CHLORIDE mmol/L  --  105  --   --  100   CO2 mmol/L  --  24  --   --  29   BUN mg/dL  --  26*  --   --  29*   CREATININE mg/dL  --  1 08  --   --  0 98   CALCIUM mg/dL  --  6 7*  --   --  8 2*   TOTAL PROTEIN g/dL  --   --   --   --  7 0   BILIRUBIN TOTAL mg/dL  --   --   --   --  0 64   ALK PHOS U/L  --   --   --   --  71   ALT U/L  --   --   --   --  24   AST U/L  --   --   --   --  25   GLUCOSE RANDOM mg/dL  --  259*  --   --   --    GLUCOSE, ISTAT mg/dl 274*  --  308*  < >  --    < > = values in this interval not displayed  /38/94/23 5/97%  CXR: ETT well-positioned above the dwayne  R IJ well-placed  No PTX  Cardiomegaly noted  No acute abnormality  I have personally reviewed pertinent films in PACS  EKG: NSR with 1st degree AV block and LBBB  Unchanged from previous  This was personally reviewed by myself       Impression:  Principal Problem:    Critical aortic valve stenosis  Active Problems:    Essential hypertension    CAD (coronary artery disease)    Type 2 diabetes mellitus with complication, with long-term current use of insulin (HCC)    Atrial flutter (HCC)    Gastroesophageal reflux disease without esophagitis    Moderate mitral stenosis    Atherosclerosis of artery of extremity with ulceration (HCC)    Hx of CABG    Chronic anticoagulation    Chronic diastolic congestive heart failure (HCC)    Chronic systolic heart failure (HCC)    Ulcer of toe of left foot (HCC)    Ulcer of toe of right foot ([address] )    Hypokalemia    Hypocalcemia    Acute respiratory insufficiency, postoperative    Left bundle branch block (LBBB)    1st degree AV block      Plan:    Neuro: D/C sedation  PRN tylenol for pain  CV: MAP goal >65  SBP goal <130  CI>2 2  Post-op medications: None  Volume resuscitation as needed  Isolyte 50 mL/hour  Monitor rhythm on telemetry  Intra-op ROBB EF 40%  Lung: Check STAT post-op ABG and CXR  Wean vent to extubate  GI: GI prophylaxis  Bowel regimen  FEN: NPO  Replete K >4 0 and Mag >2 0  : Mccormick  Monitor UOP with goal >40/hour  Check BMP  Lasix if needed  ID: Prophylactic post-op abx  Maintain normothermia  Trend WBC and temps  Heme: Check STAT post-op H/H and platelets  Monitor incision site and  invasive lines for bleeding  Endo: Insulin gtt    Peripheral Vascular: Monitor distal pulses Q 1 hour  Disposition: ICU Care    Counseling / Coordination of Care  Total Critical Care time spent 0 minutes excluding procedures, teaching and family updates        SIGNATURE: Florence Riedel, PA-C  DATE: April 3, 2018  TIME: 1:17 PM

## 2018-04-03 NOTE — ANESTHESIA PROCEDURE NOTES
Procedure Performed: ROBB Anesthesia  Start Time:   End Time:       Preanesthesia Checklist    Patient identified, IV assessed, risks and benefits discussed, monitors and equipment assessed, procedure being performed at surgeon's request and anesthesia consent obtained  Procedure    Type of RBOB: interventional ROBB with real time guidance of intracardiac procedure  Images Saved: ultrasound permanent image saved  Physician Requesting Echo: Curt Durán  Location performed: OR  Intubated  Bite block not placed  Heart visualized  Insertion of ROBB Probe:  Atraumatic  Probe Type:  Epiaortic and multiplane  Modalities:  Color flow mapping, 3D, continuous wave Doppler and pulse wave Doppler  Echocardiographic and Doppler Measurements    PREPROCEDURE    LEFT VENTRICLE:  Systolic Function: moderately impaired  Ejection Fraction: 35%  Cavity size: normal      RIGHT VENTRICLE:  Systolic Function: normal   Cavity size mildly dilated  Hypertrophy present  AORTIC VALVE:  Leaflets: 2 3 x 2 32, area 4 24cm2 and trileaflet  Leaflet motions restricted  Stenosis: severe  Mean Gradient: 30 mmHg  Peak Gradient: 61 mmHg  Area: 0 5 cm²  Regurgitation: mild  MITRAL VALVE:  Leaflets: calcified  Leaflet Motions: restricted  Regurgitation: moderate  Vena Contracta Width: 0 45 cm  Stenosis: severe  Mean Gradient: 13 mmHg  TRICUSPID VALVE:  Leaflets: normal  Leaflet Motions: normal  Stenosis: none  Regurgitation: moderate  PULMONIC VALVE:  Leaflets: normal  Regurgitation: mild  Stenosis: none  ASCENDING AORTA:  Size:  normal  Dissection not present  AORTIC ARCH:  Size:  normal  dissection not present  Grade 3: atheroma protruding < 0 5 cm into lumen  DESCENDING AORTA:  Size: normal  Dissection not present  Grade 3: atheroma protruding < 0 5 cm into lumen  RIGHT ATRIUM:  Size:  normal  No spontaneous echo contrast     LEFT ATRIUM:  Size: dilated   Spontaneous echo contrast     LEFT ATRIAL APPENDAGE:  Size: normal  Spontaneous echo contrast  Emptying Velocity: 40 cm/sec  ATRIAL SEPTUM:  Intra-atrial septal morphology: normal          VENTRICULAR SEPTUM:  Intra-ventricular septum morphology: normal              OTHER FINDINGS:  Pericardium:  normal  Pleural Effusion:  none  POSTPROCEDURE    LEFT VENTRICLE:   Systolic Function: mildly depressed  Ejection Fraction: 40 %  RIGHT VENTRICLE: Unchanged   AORTIC VALVE:   Leaflets: bioprosthetic  Stenosis: none  Mean Gradient: 3 mmHg  Regurgitation: no PVL CHER 2 1cm2 and none  Valve Size: 26 mm  MITRAL VALVE: Unchanged   Mean Gradient: 11  TRICUSPID VALVE: Unchanged   PULMONIC VALVE: Unchanged           ATRIA: Unchanged   AORTA: Unchanged   Dissection: Dissection not present  REMOVAL:  Probe Removal: atraumatic

## 2018-04-03 NOTE — OP NOTE
OPERATIVE REPORT  PATIENT NAME: Chase Hubbard    :  1940  MRN: [id number]  Pt Location: [facility] OR ROOM 02    SURGERY DATE: 4/3/2018    SURGEON: Rocio Disla MD     ASSISTANT: Syed Rosado DO     CO-SURGEON: Dean Waldrop MD    PREOPERATIVE DIAGNOSIS Symptomatic severe aortic stenosis  POSTOPERATIVE DIAGNOSIS Symptomatic severe aortic stenosis  NYHA CLASS: 3    CCS CLASS: 1    PROCEDURE Transcatheter aortic valve replacement with a 26 mm Cervantes YEVGENIY 3 bioprosthetic valve via a percutaneous left transfemoral approach  ANESTHESIA Dr Monique Watts and Dr Rachel Cordero, general endotracheal anesthesia with transesophageal echocardiogram guidance  CARDIOPULMONARY BYPASS TIME 0  PACKS/TUBES/DRAINS None  ESTIMATED BLOOD LOSS: 50 mL    OPERATIVE TECHNIQUE The patient was taken to the operating room and placed supine on the operating table  Following the satisfactory induction of general anesthesia and placement of monitoring lines, the patient was prepped and draped in the usual sterile fashion  A time-out procedure was performed  The right common femoral artery was accessed percutaneously using Seldinger technique and fluoroscopy  The right common femoral vein was accessed in a similar fashion and was cannulated with a 6 [place] sheath  The femoral artery was cannulated with a 7 Indonesian sheath  A pigtail catheter was inserted and advanced through the right common femoral artery sheath into the right coronary cusp  Using a series of injections, the angle of deployment was determined  Through the right common femoral vein sheath, a balloon tip temporary pacing catheter was inserted into the right ventricle and its capture was confirmed  The left common femoral artery was accessed percutaneously using Seldinger technique and fluoroscopy  Two (2) Perclose sutures were deployed in the standard fashion The patient was systemically heparinized   The left common femoral artery was then accessed with an extra-stiff wire and the sheath was inserted through the left common femoral artery and advanced into the aorta  The aortic valve was crossed with a wire  The balloon was inserted through the sheath and positioned in the aortic annulus  During an episode of rapid pacing, balloon valvuloplasty was performed  The balloon was subsequently removed  A 26 mm YEVGENIY 3 valve was then advanced through the sheath into the aorta and positioned onto the deployment balloon in the aorta and then advanced around the aortic arch and through the annulus of the aortic valve to the appropriate level  At this point, the catheter was desheathed in the standard fashion  The valve was positioned appropriately using a combination of fluoroscopy and transesophageal echocardiogram guidance  During an episode of rapid pacing, balloon deployment of the 26 mm YEVGENIY 3 valve was performed  Following deployment, the position of the valve was confirmed by fluoroscopy and echocardiography and its position appeared appropriate with trace perivalvular leak  The valve delivery system was subsequently removed and the sheath was removed from the left common femoral artery while the Perclose sutures were secured and angioseal was deployed and direct pressure was held  Protamine was administered with normalization of the ACT  Pressure was released from the right groin and there was no active bleeding and no evidence of hematoma development  The common femoral artery sheath was removed from the right following deployment of a closure device  The common femoral vein sheath was removed from the right and pressure was held  Sponge, needle, and instrument counts were reported as correct by the nursing staff  There was no qualified surgical resident available to assist  As the attending surgeon, I was present and scrubbed for all critical portions of this procedure   Final transesophageal echocardiogram demonstrated a well-positioned bioprosthetic transcatheter aortic valve with normal function and trace perivalvular leak           SIGNATURE: Miladis Celestin MD  DATE: April 3, 2018  TIME: 2:50 PM

## 2018-04-03 NOTE — PHYSICIAN ADVISOR
This patient is a [de-identified] y o  y/o female who is admitted to the hospital for TAVR  The patient presented to the ED on  at  and was admitted to the hospital on 4/3/2018 0626  History of Present Illness includes: Mary Anne Martínez is a [de-identified]y o  year old female who was previously evaluated in our office by RUTH Gilmore  for transcatheter aortic valve replacement on 2/21/2018  During this initial consultation, arrangements were made for the following studies to be completed: Gated CTA of the chest/abdomen/pelvis, 3D ROBB, left and right cardiac catheterization, dental clearance, pulmonary function tests with ABG, and carotid artery ultrasound      Mary Anne Martínez now presents to review the results of these tests and obtain a second surgeon to confirm the suitability of proceeding with transcatheter aortic valve replacment      Tracey Calvert is referred to us by Dr Sana Marin  Her PMH includes CAD/MSTEMI s/p CABG x 6 (2004 at [facility]), HTN, hyperlipidemia, PAD (h/o non-healing L medial great toe ulcer), DM type 2 (insulin), PAF, hypothyroidism, asthma and GERD  She previous had been receiving her medical care through the [facility]  She was diagnosed with aortic stenosis in 2016  She was admitted to the [facility] in December 2017 with flash pulmonary edema and elevated troponin  Her echocardiogram that was performed in March 2017 demonstrated severe aortic stenosis  She had follow up with cardiology after her hospital admission and agreed to surgical consultation  She is symptomatic with progressive SOB/IGNACIO  She also experiences lightheadedness but denies syncope  Her weight a lower extremity edema is stable on an increased dose of Furosemide (40 mg BID)  She denies chest pain, palpitations, PND, orthopnea  She has bilateral foot pain (burning sensation), right foot worse than eft related to LE arterial insufficiency  She has dry, non-healing ulcers on her toes of both feet   She states she is followed by   Doctor and was just seen on Monday       She is a non smoker, non-drinker  She is edentulous with upper and lower dentures  Vital signs in the ER are as follows ED Triage Vitals [04/03/18 0653]   Temperature Pulse Respirations Blood Pressure SpO2   98 7 °F (37 1 °C) 88 16 158/83 98 %      Temp Source Heart Rate Source Patient Position - Orthostatic VS BP Location FiO2 (%)   Tymp Core Monitor -- -- --      Pain Score       --             The patient is admitted as INPATIENT  and has remained hospitalized for 0 day(s)  Last vital signs were Blood pressure 140/55, pulse 62, temperature 97 5 °F (36 4 °C), temperature source Probe, resp  rate 13, height 5' 4" (1 626 m), weight 75 3 kg (166 lb), SpO2 97 %, not currently breastfeeding  Treatment includes: TAVR  Results include:       0  Lab Value Date/Time   TROPONINI 2 02 (H) 03/16/2018 0508   TROPONINI 2 31 (H) 03/16/2018 0208   TROPONINI 2 20 (H) 03/15/2018 2302   TROPONINI 1 98 (H) 03/15/2018 1938   TROPONINI 1 61 (H) 03/15/2018 1629   TROPONINI 0 56 (H) 03/15/2018 1331   TROPONINI 2 38 (H) 12/20/2017 0806   TROPONINI 2 85 (H) 12/20/2017 0537   TROPONINI 2 88 (H) 12/20/2017 0537   TROPONINI 1 34 (H) 12/19/2017 2141   TROPONINI 0 43 (H) 12/19/2017 1655   TROPONINI 0 02 12/19/2017 1424                 Results from last 7 days  Lab Units 04/03/18  1213 04/03/18  1210 04/03/18  1117  03/29/18  0924   WBC Thousand/uL  --   --   --   --  8 88   HEMOGLOBIN g/dL  --  8 9*  --   --  10 2*   I STAT HEMOGLOBIN g/dl 9 2*  --  9 5*  < >  --    HEMATOCRIT %  --  29 8*  --   --  34 1*   PLATELETS Thousands/uL  --  250  --   --  280   < > = values in this interval not displayed        Results from last 7 days  Lab Units 04/03/18  1609 04/03/18  1213 04/03/18  1210  03/29/18  0924   SODIUM mmol/L  --   --  138  --  137   POTASSIUM mmol/L 4 5  --  3 9  --  4 7   CHLORIDE mmol/L  --   --  105  --  100   CO2 mmol/L  --   --  24  --  29   BUN mg/dL  --   --  26*  --  29* CREATININE mg/dL  --   --  1 08  --  0 98   GLUCOSE RANDOM mg/dL  --   --  259*  --   --    GLUCOSE, ISTAT mg/dl  --  274*  --   < >  --    CALCIUM mg/dL  --   --  6 7*  --  8 2*   < > = values in this interval not displayed  CXR: Lines and tubes as described above  Status post aortic valve replacement   No pneumothorax  Small left pleural effusion questioned  The patient is appropriate for  Inpatient Admission  The rationale is as follows: The patient is a [de-identified] y/o female who presented for planned TAVR for severe symptomatic aortic stenosis  She is POD #0 from that procedure  This is an inpatient only procedure per medicare and therefore the patient is appropriate for acute inpatient LOC  She will have high intensity of service and severity of illness  There is documentation by the admitting physician that the patient would require greater than two midnight stay based on medical necessity  Hospitalization is necessary to prevent further deterioration of her clinical condition  After review of the medical chart, labs, imaging, and notes - I agree that the patient meets criteria for INPATIENT ADMISSION  The patient had a hospitalization within the last 30 days - after reviewing the chart from that admission it was determined that the two admissions were for the different conditions  Current admission is for a planned procedure, TAVR, and therefore not related to a prior acute admission  These two admissions are therefore unrelated

## 2018-04-03 NOTE — ANESTHESIA POSTPROCEDURE EVALUATION
Post-Op Assessment Note      CV Status:  Stable    Mental Status:  Unresponsive    Hydration Status:  Stable    PONV Controlled:  None    Airway Patency:  Patent  Airway: intubated    Post Op Vitals Reviewed: Yes          Staff: Anesthesiologist       Comments: Patient intubated and sedated in ICU   Full report given to critical care team           /55 (04/03/18 1149)    Temp (!) 96 4 °F (35 8 °C) (04/03/18 1149)    Pulse 80 (04/03/18 1149)   Resp 14 (04/03/18 1149)    SpO2 99 % (04/03/18 1149)

## 2018-04-03 NOTE — ANESTHESIA PROCEDURE NOTES
Arterial Line Insertion  Date/Time: 4/3/2018 8:59 AM  Performed by: Kwabena Catherine  Authorized by: Ayleen Holm   Consent: Verbal consent obtained  Written consent obtained  Risks and benefits: risks, benefits and alternatives were discussed  Consent given by: patient  Patient understanding: patient states understanding of the procedure being performed  Patient consent: the patient's understanding of the procedure matches consent given  Procedure consent: procedure consent matches procedure scheduled  Relevant documents: relevant documents present and verified  Patient identity confirmed: verbally with patient, arm band, provided demographic data and hospital-assigned identification number  Preparation: Patient was prepped and draped in the usual sterile fashion  Indications: hemodynamic monitoring  Orientation:  LeftLocation: brachial artery  Anesthesia: local infiltration    Anesthesia:  Local Anesthetic: lidocaine 1% without epinephrine    Sedation:  Patient sedated: yes  Sedation type: anxiolysis    Miguel's test normal: yes  Needle gauge: 4 Fr micropuncture    Seldinger technique: Seldinger technique used  Number of attempts: 3  Post-procedure: dressing applied  Post-procedure CNS: unchanged  Patient tolerance: Patient tolerated the procedure well with no immediate complications  Comments: Two unsuccessful attempts with ultrasound on R arm

## 2018-04-03 NOTE — ANESTHESIA PROCEDURE NOTES
Central Line Insertion  Performed by: Nano Turner  Authorized by: Karina Muro   Date/Time: 4/3/2018 10:13 AM  Catheter Type:  triple lumen  Indications: vascular access  Location details: right internal jugular  Catheter size: 7 Fr  Patient position: Trendelenburg    Sedation:  Patient sedated: GETA  Assessment: blood return through all ports,  free fluid flow and placement verified by x-ray  Preparation: skin prepped with 2% chlorhexidine  Skin prep agent dried: skin prep agent completely dried prior to procedure  Sterile barriers: all five maximum sterile barriers used - cap, mask, sterile gown, sterile gloves, and large sterile sheet  Hand hygiene: hand hygiene performed prior to central venous catheter insertion  Ultrasound guidance: yes  sterile gel and probe cover used in ultrasound-guided central venous catheter insertionConsent: Verbal consent obtained  Written consent obtained    Risks and benefits: risks, benefits and alternatives were discussed  Consent given by: patient  Patient understanding: patient states understanding of the procedure being performed  Patient consent: the patient's understanding of the procedure matches consent given  Procedure consent: procedure consent matches procedure scheduled  Relevant documents: relevant documents present and verified  Patient identity confirmed: verbally with patient, arm band, provided demographic data and hospital-assigned identification number  Pre-procedure: landmarks identified  Number of attempts: 1  Successful placement: yes  Post-procedure: line sutured,  dressing applied and chlorhexidine patch applied  Patient tolerance: Patient tolerated the procedure well with no immediate complications

## 2018-04-03 NOTE — CASE MANAGEMENT
Initial Clinical Review    Age/Sex: [de-identified] y o  female    Surgery Date: 04/03/2018    Procedure: Transcatheter aortic valve replacement with a 26 mm Cervantes YEVGENIY 3 bioprosthetic valve via left transfemoral approach  Anesthesia: Idalia Holbrook, general endotracheal anesthesia with transesophageal echocardiogram guidance  Admission Orders: Date/Time/Statement: 4/3/18 @ 1051   Orders Placed This Encounter   Procedures    Inpatient Admission     Standing Status:   Standing     Number of Occurrences:   1     Order Specific Question:   Admitting Physician     Answer:   Lamar Mcdermott     Order Specific Question:   Level of Care     Answer:   Critical Care [15]     Order Specific Question:   Estimated length of stay     Answer:   More than 2 Midnights     Order Specific Question:   Certification     Answer:   I certify that inpatient services are medically necessary for this patient for a duration of greater than two midnights  See H&P and MD Progress Notes for additional information about the patient's course of treatment  Vital Signs: /55   Pulse 83   Temp (!) 96 4 °F (35 8 °C) (Probe)   Resp 14   Ht 5' 4" (1 626 m)   Wt 75 3 kg (166 lb)   LMP  (LMP Unknown)   SpO2 98%   BMI 28 49 kg/m²     Diet:        Diet Orders            Start     Ordered    04/03/18 1400  Diet Cardiac; Cardiac TLC 2 3 GM NA; Fluid Restriction 1800 ML  Diet effective 1400     Question Answer Comment   Diet Type Cardiac    Cardiac Cardiac TLC 2 3 GM NA    Other Restriction(s): Fluid Restriction 1800 ML    RD to adjust diet per protocol? Yes        04/03/18 1149    04/03/18 1150  Diet Clear Liquid  Diet effective 1000     Question Answer Comment   Diet Type Clear Liquid    RD to adjust diet per protocol? Yes        04/03/18 1149      A  Line left brachial  ECHO: pending  Glucose finger stick QID  Neurovascular checks q1h for 1st 24 hours then q4h    Mobility: Up with assistance / Up as tolerated / POD#1 OOB to chair and for all meals    DVT Prophylaxis: Brady SCDs    Pain Control:   Pain Medications             aspirin 81 MG tablet Take 81 mg by mouth daily    PREDNISONE PO Take 60 mg by mouth daily        Scheduled Meds:  Current Facility-Administered Medications:  acetaminophen 650 mg Rectal Q4H PRN    acetaminophen 650 mg Oral Q4H PRN    albuterol 2 puff Inhalation BID PRN    aspirin 81 mg Oral Daily    atorvastatin 80 mg Oral Daily With Dinner    bisacodyl 10 mg Rectal Daily PRN    calcium chloride 1 g Intravenous Once    cefazolin 2,000 mg Intravenous Q8H    chlorhexidine 15 mL Swish & Spit BID    cholecalciferol 1,000 Units Oral Daily    fentanyl citrate (PF) 50 mcg Intravenous Once    fentanyl citrate (PF) 50 mcg Intravenous Q1H PRN    [START ON 4/4/2018] heparin (porcine) 5,000 Units Subcutaneous Q8H [address]    insulin lispro 1-6 Units Subcutaneous TID AC    levothyroxine 100 mcg Oral Early Morning    loratadine 10 mg Oral Daily    multi-electrolyte 50 mL/hr Intravenous Continuous Last Rate: 50 mL/hr (04/03/18 1229)   mupirocin 1 application Nasal S29V [address]    niCARdipine 2 5-15 mg/hr Intravenous Titrated Last Rate: 5 mg/hr (04/03/18 1229)   nitroglycerin 0 4 mg Sublingual Q3 min PRN    ondansetron 4 mg Intravenous Q6H PRN    pantoprazole 40 mg Oral Early Morning    polyethylene glycol 17 g Oral Daily    potassium chloride 20 mEq Intravenous Once PRN    potassium chloride 20 mEq Intravenous Q1H PRN    potassium chloride 20 mEq Intravenous Q30 Min PRN    warfarin 3 mg Oral Once (warfarin)      Continuous Infusions:  multi-electrolyte 50 mL/hr Last Rate: 50 mL/hr (04/03/18 1229)   niCARdipine 2 5-15 mg/hr Last Rate: 5 mg/hr (04/03/18 1229)

## 2018-04-03 NOTE — H&P (VIEW-ONLY)
History & Physical - Cardiothoracic Surgery   Salvador Shah [de-identified] y o  female MRN: [id number]    Physician Requesting Consult: Linda Jimenes MD    Reason for Consult / Principal Problem: Aortic stenosis    History of Present Illness: Salvador Shah is a [de-identified]y o  year old female who was previously evaluated in our office by RUTH Hinojosa  for transcatheter aortic valve replacement on 2/21/2018  During this initial consultation, arrangements were made for the following studies to be completed: Gated CTA of the chest/abdomen/pelvis, 3D ROBB, left and right cardiac catheterization, dental clearance, pulmonary function tests with ABG, and carotid artery ultrasound  Salvador Shah now presents to review the results of these tests and obtain a second surgeon to confirm the suitability of proceeding with transcatheter aortic valve replacment  Shahid Darden is referred to us by Dr Linda Jimenes  Her PMH includes CAD/MSTEMI s/p CABG x 6 (2004 at [facility]), HTN, hyperlipidemia, PAD (h/o non-healing L medial great toe ulcer), DM type 2 (insulin), PAF, hypothyroidism, asthma and GERD  She previous had been receiving her medical care through the [facility] network  She was diagnosed with aortic stenosis in 2016  She was admitted to the [address] in December 2017 with flash pulmonary edema and elevated troponin  Her echocardiogram that was performed in March 2017 demonstrated severe aortic stenosis  She had follow up with cardiology after her hospital admission and agreed to surgical consultation  She is symptomatic with progressive SOB/IGNACIO  She also experiences lightheadedness but denies syncope  Her weight a lower extremity edema is stable on an increased dose of Furosemide (40 mg BID)  She denies chest pain, palpitations, PND, orthopnea  She has bilateral foot pain (burning sensation), right foot worse than eft related to LE arterial insufficiency  She has dry, non-healing ulcers on her toes of both feet   She states she is followed by Dr Monsivais and was just seen on Monday  She is a non smoker, non-drinker  She is edentulous with upper and lower dentures       Past Medical History:  Past Medical History:   Diagnosis Date    Altered mental status     Anticoagulated     Coumadin for Aflutter    Asthma     Atrial flutter ([address] )     maintained on coumadin anticoag    CAD (coronary artery disease)     Candidiasis     Carotid stenosis, bilateral     Cataract     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) [facility])     Chronic fatigue syndrome     Chronic low back pain     Concussion w loss of consciousness of unsp duration, init     Coronary artery disease     Diabetes mellitus ([facility])     type 2, insulin dependent    DJD (degenerative joint disease)     GERD (gastroesophageal reflux disease)     Herpes zoster     HLD (hyperlipidemia)     HTN (hypertension)     Hyperlipidemia     Hypothyroidism     Irritable bowel syndrome     Lumbar radiculopathy     Lyme disease     Dx in hospital 8/2011    Lyme disease     MI (myocardial infarction)     Migraines     Muscle spasm     Non-neoplastic nevus     Nontoxic multinodular goiter     OA (osteoarthritis)     Osteoarthritis     Other chronic pain     PAD (peripheral artery disease) ([facility])     Palpitations     Pseudogout     Spinal stenosis     Transient cerebral ischemia     Trigger ring finger     Viral gastroenteritis          Past Surgical History:   Past Surgical History:   Procedure Laterality Date    APPENDECTOMY      ARTERIOGRAM  12/19/2017    CARDIAC CATHETERIZATION      CHOLECYSTECTOMY      COLONOSCOPY      CORONARY ARTERY BYPASS GRAFT  2004    LUMBAR LAMINECTOMY      THYROIDECTOMY      TOTAL ABDOMINAL HYSTERECTOMY W/ BILATERAL SALPINGOOPHORECTOMY           Family History:  Family History   Problem Relation Age of Onset    Cancer Mother     Stroke Mother     Cancer Father     Heart disease Father     Breast cancer Sister     Diabetes Daughter      Passed away January 2017          Social History:    History   Alcohol Use No     History   Drug Use No     History   Smoking Status    Never Smoker   Smokeless Tobacco    Never Used       Home Medications:   Prior to Admission medications    Medication Sig Start Date End Date Taking?  Authorizing Provider   albuterol (VENTOLIN HFA) 90 mcg/act inhaler Inhale 108 mcg 2 (two) times a day as needed 2 puffs bid PRN    Historical Provider, MD   aspirin 81 MG tablet Take 81 mg by mouth daily    Historical Provider, MD   atorvastatin (LIPITOR) 80 mg tablet Take 80 mg by mouth daily    Historical Provider, MD   calcium carbonate (TUMS) 500 mg chewable tablet Chew as needed    Historical Provider, MD   cholecalciferol (VITAMIN D3) 1,000 units tablet Take 1,000 mg by mouth daily 10/20/16   Historical Provider, MD   esomeprazole (NexIUM) 20 mg capsule 20 mg daily    Historical Provider, MD   furosemide (LASIX) 40 mg tablet Take 1 tablet (40 mg total) by mouth 2 (two) times a day 3/20/18   Remy Salinas DO   LANTUS 100 UNIT/ML subcutaneous injection Inject 30 Units under the skin 2 (two) times a day IN THE MORNING AND 30 UNITS AT BEDTIME 3/28/18   Manuel Lux PA-C   levothyroxine 100 mcg tablet Take 100 mcg by mouth daily    Historical Provider, MD   lisinopril (ZESTRIL) 5 mg tablet Take 5 mg by mouth daily at bedtime    Historical Provider, MD   loratadine (CLARITIN) 10 mg tablet Take 1 tablet (10 mg total) by mouth daily 3/28/18   Manuel Lux PA-C   Magnesium Oxide 400 MG CAPS Take 400 mg by mouth 2 (two) times a day 8/31/16   Historical Provider, MD   metFORMIN (GLUCOPHAGE) 1000 MG tablet 1,000 mg 2 (two) times a day    Historical Provider, MD   metoprolol succinate (TOPROL-XL) 100 mg 24 hr tablet 100 mg daily at bedtime    Historical Provider, MD   metoprolol succinate (TOPROL-XL) 50 mg 24 hr tablet Take 1 tablet by mouth daily with breakfast 9/1/16   Historical Provider, MD Mometasone Furoate [facility] HFA) 100 MCG/ACT AERO Inhale 2 puffs once daily 3/12/18   Leonie Colbert MD   nitroglycerin (NITROSTAT) 0 4 mg SL tablet Place 0 4 mg under the tongue every 3 (three) minutes as needed 5/11/15   Historical Provider, MD   potassium chloride (K-DUR,KLOR-CON) 10 mEq tablet Take 1 tablet (10 mEq total) by mouth 2 (two) times a day 3/20/18   Stevphen Coral,    warfarin (COUMADIN) 3 mg tablet Take 3 mg by mouth daily 6 mg tuesdays, 3 mg all other days 6/27/16   Historical Provider, MD       Allergies:   Allergies   Allergen Reactions    Other Chest Pain     IVP-listed as "chest pain" in previous chart, patient stated this occurred "a long time ago" but does not remember exactly occurred     Cephalosporins Chest Pain    Contrast [Iodinated Diagnostic Agents] Other (See Comments)     Flash pulm edema    Doxycycline Chest Pain    Levaquin [Levofloxacin] Chest Pain    Ondansetron Chest Pain     Prolonged QT    Toradol [Ketorolac Tromethamine] Chest Pain       Review of Systems:  Review of Systems - History obtained from chart review and the patient  General ROS: positive for fatigue; negative for fevers, chills, recent weight gain, sleep disturbance, night sweats  Psychological ROS: negative  Hematological and Lymphatic ROS: positive for - bruising  negative for - bleeding problems or blood clots  Respiratory ROS: positive for - shortness of breath  negative for - cough, hemoptysis, orthopnea or wheezing  Cardiovascular ROS: positive for - dyspnea on exertion  negative for - chest pain, orthopnea or paroxysmal nocturnal dyspnea  Gastrointestinal ROS: no abdominal pain, change in bowel habits, or black or bloody stools  Musculoskeletal ROS: negative  Neurological ROS: no TIA or stroke symptoms    Vital Signs:     Vitals:    03/29/18 1200 03/29/18 1253   BP: 102/52 100/52   BP Location: Left arm Right arm   Cuff Size: Adult    Pulse: 88    Resp: 14    Temp: 97 9 °F (36 6 °C)    TempSrc: Oral    SpO2: 97%    Weight: 75 8 kg (167 lb)    Height: 5' 4" (1 626 m)        Physical Exam:    General: Well developed, no acute distress  Awake, Alert and Oriented  HEENT/NECK:  PERRLA  No jugular venous distention  Cardiac:Regular rate and rhythm, 3/6 harsh systolic murmur, loudest at RUSB  Carotids: 1+ pulses, delayed upstrokes, no bruits  Pulmonary:  Breath sounds clear bilaterally  Abdomen:  Non-tender, Non-distended  Positive bowel sounds  Lower extremities: Extremities warm/dry  PT/DP pulses absent bilaterally  Bilateral Femoral pulses 2 +  Trace edema right foot, no edema on the left  L medial great toe and R medial great toe, plantar surface of R 3rd toe and lateral aspect of R 5th toe all with ~3mm dry wounds with overlying eschar and no signs of infection  R great toe with ~4mm skin tear, superficial, dry eschar  Neuro: Alert and oriented X 3  Sensation is grossly intact  No focal deficits  Skin: Warm/Dry, without rashes or lesions      Lab Results:   Lab Results   Component Value Date    WBC 14 90 (H) 03/20/2018    HGB 9 0 (L) 03/20/2018    HCT 29 5 (L) 03/20/2018    MCV 70 (L) 03/20/2018     03/20/2018     Lab Results   Component Value Date    GLUCOSE 190 (H) 03/20/2018    CALCIUM 7 4 (L) 03/20/2018     03/20/2018    K 3 9 03/20/2018    CO2 25 03/20/2018     (H) 03/20/2018    BUN 27 (H) 03/20/2018    CREATININE 0 81 03/20/2018       Results from last 7 days  Lab Units 03/23/18  1447 03/23/18   INR  1 34* 1 34*     Lab Results   Component Value Date    HGBA1C 7 5 (H) 03/17/2018     Lab Results   Component Value Date    CKTOTAL 83 06/09/2017    TROPONINI 2 02 (H) 03/16/2018       Imaging Studies:     Gated CTA of the chest/abdomen/pelvis:   FINDINGS:     VASCULAR STRUCTURES:       Annulus: diameter 31 4 x 20 0 mm      area: 482 2 sq mm    Annulus to LCA: 14 4 mm    Annulus to RCA: 12 9 mm    Minimal diameter right iliofemoral segment: 5 1 mm    Minimal diameter left iliofemoral segment: 6 6 mm     Heart size is enlarged  Aortic valve is moderately calcified  There is also mitral valve annular calcifications  There is no pericardial effusion  There is been prior vein grafting to the left coronary  Atretic appearing possibly occluded LIMA graft   is also seen  The ascending aorta is ectatic measuring 37 mm  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  There is no great vessel stenosis proximally  There is moderate calcification of the aortic arch and descending thoracic   aorta which are normal in caliber  The abdominal aorta is tortuous but normal in caliber  There is a moderate stenosis of the proximal right common iliac artery with the lumen narrows to 5 mm with comparison measurements of 10 mm of the distal common   iliac  Internal iliac arteries are patent bilaterally  There is no significant external or common femoral stenosis  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 with relatively high origin of the medial profunda branch  There is no celiac   stenosis  There is a concentric stenosis of the SMA 1 cm from its origin measuring 40% in severity  No significant renal artery stenosis is seen        3D ROBB:   SUMMARY     LEFT VENTRICLE:  Systolic function was moderately reduced  Ejection fraction was estimated to be 35 %  There was moderate diffuse hypokinesis  Wall thickness was moderately increased  There was moderate concentric hypertrophy      LEFT ATRIUM:  The atrium was markedly dilated      ATRIAL SEPTUM:  No defect or patent foramen ovale was identified by color Doppler      RIGHT ATRIUM:  The atrium was moderately dilated      MITRAL VALVE:  There was marked annular calcification  There was marked diffuse thickening  There was marked calcification of the anterior and posterior leaflets, involving the leaflet base more than the margin  There was moderately reduced leaflet separation  There was moderate stenosis    There was mild to moderate regurgitation  Regurgitation grade was 1-2+ on a scale of 0 to 4+  Mean transmitral gradient was 8 mmHg  The mitral valve area by proximal isovelocity surface area method was 1 42 cm squared  The effective orifice of mitral regurgitation by proximal isovelocity surface area was 0 23 cm squared  The volume of mitral regurgitation by proximal isovelocity surface area was 33 ml      AORTIC VALVE:  The valve was trileaflet  Leaflets exhibited markedly increased thickness, marked calcification, and markedly reduced cuspal separation  There was critical stenosis  LVOT diameter was measured as 22 mm  The aortic annulus are measured 4 23 cm2  The nasima-posterior and horizontal diameters of aortic annulus measured 2 25 and 2 36 cm respectively  The aortic root measured 30  mm at sinus of valsalva and 27 mm at sino-tubular junction  The proximal ascending aorta measured 35 mm  There was mild regurgitation  Valve mean gradient was 30 mmHg  Estimated aortic valve area (by VTI) was 0 46 cm squared  Estimated aortic valve area (by Vmax) was 0 57 cm squared      TRICUSPID VALVE:  There was mild regurgitation  Estimated peak PA pressure was 60 mmHg  The findings suggest moderate pulmonary hypertension      PULMONIC VALVE:  There was trace regurgitation      AORTA:  There was atheroma in the proximal descending aorta  Left and right cardiac catheterization:   Angiographic findings  Native coronary lesions:  ·Mid LAD: Lesion 1: 90 % stenosis  ·D1: Lesion 1: 100 % stenosis  ·D2: Lesion 1: 100 % stenosis  ·D3: Lesion 1: 100 % stenosis  ·OM1: Lesion 1: 100 % stenosis  ·Proximal RCA: Lesion 1: 60 % stenosis    ·RPDA: Lesion 1: 50 % stenosis      Coronary graft lesions:  ·Graft to D1: SVG  · 50 % stenosis at proximal anastomosis, discrete      ·Graft to OM1: SVG  · 100 % stenosis at proximal anastomosis      ·Graft to RPDA: SVG  · 100 % stenosis at proximal anastomosis    Pulmonary function tests: FeV1 1 16 L; 60% predicted  DLCO 40%    Arterial Blood gas: pH 7 45, pO2 75 pCO2 34    Carotid artery ultrasound:  RIGHT:  There is <50% stenosis noted in the internal carotid artery  Plaque is  heterogenous and irregular  Vertebral artery flow is antegrade  There is no significant subclavian artery  disease  LEFT:  There is <50% stenosis noted in the internal carotid artery  Plaque is  heterogenous and irregular  Vertebral artery flow is antegrade  There is no significant subclavian artery  Disease  I have personally reviewed pertinent reports        TAVR evaluation Assessment:     5 Meter Walk Test:      Attempt 1: 9 sec   Attempt 2: 8 sec   Attempt 3: 8 sec    STS Risk Score: 11%      NYHC: III    KCCQ-12 completed    Assessment:  Patient Active Problem List    Diagnosis Date Noted    Acute on chronic diastolic CHF (congestive heart failure) [facility]) 03/28/2018    Prolonged Q-T interval on ECG 03/28/2018   [facility] discharge follow-up 03/28/2018    Ulcer of toe of right foot ([address] ) 03/26/2018    Severe aortic stenosis 03/23/2018    Atrial fibrillation with rapid ventricular response ([address] )     Asthma 03/16/2018    Acute cardiogenic pulmonary edema ([address] ) 03/15/2018    Hx of CABG 01/15/2018    Chronic diastolic congestive heart failure ([address] ) 01/15/2018    Persistent atrial fibrillation ([address] ) 01/15/2018    PAD (peripheral artery disease) ([address] ) 12/19/2017    Essential hypertension 12/19/2017    Critical aortic valve stenosis 12/19/2017    CAD (coronary artery disease) 12/19/2017    Type 2 diabetes mellitus with complication, with long-term current use of insulin ([address] ) 12/19/2017    Atrial flutter ([address] ) 12/19/2017    Hyperlipidemia 12/19/2017    Gastroesophageal reflux disease without esophagitis 12/19/2017    Moderate mitral stenosis 12/19/2017    LEONARD (acute kidney injury) ([address] ) 12/19/2017    Asymptomatic carotid artery stenosis, bilateral 11/16/2017    Ulcer of toe of left foot ([address] ) 10/23/2017    Migraine headache 10/18/2017    Degenerative joint disease involving multiple joints 07/28/2017    [facility]ros[facility] artery of extremity [facility] ulceration [facility] ) 06/09/2017    Chronic anticoagulation 08/22/2016    Diabetic retinopathy ([facility] ) 08/02/2016    Chronic systolic heart failure ([facility] ) 07/20/2016    Nephrolithiasis 12/17/2015    Osteoarthritis of both knees 11/23/2015    Arthritis of hand 12/26/2014    Postoperative hypothyroidism 10/16/2013     Severe aortic stenosis; plan to schedule TAVR     Plan:    Jewel Marmolejo has severe symptomatic aortic stenosis  Based on her STS risk assessment, she has undergone testing for transcatheter aortic valve replacement  The results of these studies have been interpreted by two independent cardiac surgeons who have determined the patient to be High risk for open aortic valve replacement  The risks, benefits, and alternatives to TAVR were discussed in detail with the patient today  She understands and wish to proceed with transfemoral transcatheter aortic valve replacement  Informed consent was obtained and a date for the intervention has been set  Jewel Marmolejo was comfortable with our recommendations, and her questions were answered to her satisfaction  The following preoperative instructions were provided at the conclusion of their consultation:     1  You will receive a phone call from the hospital between 2:00 PM and 8:00 PM the day prior to surgery to confirm arrival time and location  For surgery on Mondays, you will receive a call on Friday  2  Do not drink or eat anything after midnight the night before surgery  That includes no water, candy, gum, lozenges, Lifesavers, etc  We recommend you not eat any salty or fatty snack food, consume alcohol or smoke the night before surgery  3  Continue taking aspirin but only 81 mg daily  4  If you take Coumadin and/or Plavix, discontinue it 5 days before surgery    5  If you are diabetic, do not take any of your diabetic pills the morning of surgery  If you take Lantus insulin, you may take it at your regularly scheduled time the day before surgery  Do not take any other insulins the morning of surgery  6  The 2 nights before surgery, take a shower each night using the special antiseptic soap or soap sponges you received from the office or hospital  Linwood Salazarms your hair with regular shampoo and rinse completely before using the antiseptic sponges  Use the sponge to wash from your neck down, with special attention to the armpits and groin area  Do not use any other soap or cleanser on your skin  Do not use lotions, powder, deodorant or perfume of any kind on your skin after you shower  Use clean bed linens and wear clean pajamas after your shower  7  You will be prescribed Mupirocin nasal ointment  Apply to both nostrils twice a day for 5 days prior to surgery  8  Do not take a shower the morning of her surgery; you'll be given a special" bath" at the hospital   9  Notify the CT surgery office if you develop a cold, sore throat, cough, fever or other health issues before your surgery  10  Other medication changes included the following: Stop Coumadin 5 days before surgery  Stop Lisinopril and Metformin 3 days before surgery       SIGNATURE: RAMO Davis  DATE: March 29, 2018  TIME: 1:27 PM

## 2018-04-04 ENCOUNTER — TELEPHONE (OUTPATIENT)
Dept: INTERNAL MEDICINE CLINIC | Facility: CLINIC | Age: 78
End: 2018-04-04

## 2018-04-04 ENCOUNTER — APPOINTMENT (INPATIENT)
Dept: RADIOLOGY | Facility: HOSPITAL | Age: 78
DRG: 267 | End: 2018-04-04
Payer: MEDICARE

## 2018-04-04 ENCOUNTER — APPOINTMENT (INPATIENT)
Dept: NON INVASIVE DIAGNOSTICS | Facility: HOSPITAL | Age: 78
DRG: 267 | End: 2018-04-04
Payer: MEDICARE

## 2018-04-04 DIAGNOSIS — I35.0 SEVERE AORTIC STENOSIS: Primary | ICD-10-CM

## 2018-04-04 LAB
ANION GAP SERPL CALCULATED.3IONS-SCNC: 8 MMOL/L (ref 4–13)
ANION GAP SERPL CALCULATED.3IONS-SCNC: 8 MMOL/L (ref 4–13)
ATRIAL RATE: 104 BPM
ATRIAL RATE: 59 BPM
ATRIAL RATE: 79 BPM
BUN SERPL-MCNC: 22 MG/DL (ref 5–25)
BUN SERPL-MCNC: 23 MG/DL (ref 5–25)
CALCIUM SERPL-MCNC: 7.4 MG/DL (ref 8.3–10.1)
CALCIUM SERPL-MCNC: 7.5 MG/DL (ref 8.3–10.1)
CHLORIDE SERPL-SCNC: 106 MMOL/L (ref 100–108)
CHLORIDE SERPL-SCNC: 106 MMOL/L (ref 100–108)
CO2 SERPL-SCNC: 25 MMOL/L (ref 21–32)
CO2 SERPL-SCNC: 25 MMOL/L (ref 21–32)
CREAT SERPL-MCNC: 0.86 MG/DL (ref 0.6–1.3)
CREAT SERPL-MCNC: 1.01 MG/DL (ref 0.6–1.3)
ERYTHROCYTE [DISTWIDTH] IN BLOOD BY AUTOMATED COUNT: 18.7 % (ref 11.6–15.1)
GFR SERPL CREATININE-BSD FRML MDRD: 53 ML/MIN/1.73SQ M
GFR SERPL CREATININE-BSD FRML MDRD: 64 ML/MIN/1.73SQ M
GLUCOSE SERPL-MCNC: 196 MG/DL (ref 65–140)
GLUCOSE SERPL-MCNC: 215 MG/DL (ref 65–140)
GLUCOSE SERPL-MCNC: 232 MG/DL (ref 65–140)
GLUCOSE SERPL-MCNC: 235 MG/DL (ref 65–140)
GLUCOSE SERPL-MCNC: 278 MG/DL (ref 65–140)
GLUCOSE SERPL-MCNC: 289 MG/DL (ref 65–140)
GLUCOSE SERPL-MCNC: 326 MG/DL (ref 65–140)
HCT VFR BLD AUTO: 27 % (ref 34.8–46.1)
HCT VFR BLD AUTO: 28.3 % (ref 34.8–46.1)
HGB BLD-MCNC: 8 G/DL (ref 11.5–15.4)
HGB BLD-MCNC: 8.8 G/DL (ref 11.5–15.4)
INR PPP: 1.41 (ref 0.86–1.16)
MAGNESIUM SERPL-MCNC: 1.4 MG/DL (ref 1.6–2.6)
MAGNESIUM SERPL-MCNC: 2.2 MG/DL (ref 1.6–2.6)
MCH RBC QN AUTO: 20.7 PG (ref 26.8–34.3)
MCHC RBC AUTO-ENTMCNC: 29.6 G/DL (ref 31.4–37.4)
MCV RBC AUTO: 70 FL (ref 82–98)
P AXIS: -26 DEGREES
P AXIS: 46 DEGREES
P AXIS: 81 DEGREES
PLATELET # BLD AUTO: 230 THOUSANDS/UL (ref 149–390)
PMV BLD AUTO: 10.3 FL (ref 8.9–12.7)
POTASSIUM SERPL-SCNC: 4 MMOL/L (ref 3.5–5.3)
POTASSIUM SERPL-SCNC: 4.1 MMOL/L (ref 3.5–5.3)
PR INTERVAL: 296 MS
PR INTERVAL: 296 MS
PR INTERVAL: 354 MS
PROTHROMBIN TIME: 17.3 SECONDS (ref 12.1–14.4)
QRS AXIS: -46 DEGREES
QRS AXIS: -51 DEGREES
QRS AXIS: -74 DEGREES
QRSD INTERVAL: 154 MS
QRSD INTERVAL: 154 MS
QRSD INTERVAL: 158 MS
QT INTERVAL: 396 MS
QT INTERVAL: 463 MS
QT INTERVAL: 500 MS
QTC INTERVAL: 496 MS
QTC INTERVAL: 521 MS
QTC INTERVAL: 531 MS
RBC # BLD AUTO: 3.86 MILLION/UL (ref 3.81–5.12)
SODIUM SERPL-SCNC: 139 MMOL/L (ref 136–145)
SODIUM SERPL-SCNC: 139 MMOL/L (ref 136–145)
T WAVE AXIS: 121 DEGREES
T WAVE AXIS: 148 DEGREES
T WAVE AXIS: 156 DEGREES
VENTRICULAR RATE: 104 BPM
VENTRICULAR RATE: 59 BPM
VENTRICULAR RATE: 79 BPM
WBC # BLD AUTO: 12.62 THOUSAND/UL (ref 4.31–10.16)

## 2018-04-04 PROCEDURE — G8987 SELF CARE CURRENT STATUS: HCPCS

## 2018-04-04 PROCEDURE — 85014 HEMATOCRIT: CPT | Performed by: PHYSICIAN ASSISTANT

## 2018-04-04 PROCEDURE — 85018 HEMOGLOBIN: CPT | Performed by: PHYSICIAN ASSISTANT

## 2018-04-04 PROCEDURE — 97167 OT EVAL HIGH COMPLEX 60 MIN: CPT

## 2018-04-04 PROCEDURE — 82948 REAGENT STRIP/BLOOD GLUCOSE: CPT

## 2018-04-04 PROCEDURE — 99231 SBSQ HOSP IP/OBS SF/LOW 25: CPT | Performed by: THORACIC SURGERY (CARDIOTHORACIC VASCULAR SURGERY)

## 2018-04-04 PROCEDURE — 80048 BASIC METABOLIC PNL TOTAL CA: CPT | Performed by: PHYSICIAN ASSISTANT

## 2018-04-04 PROCEDURE — G8978 MOBILITY CURRENT STATUS: HCPCS

## 2018-04-04 PROCEDURE — 83735 ASSAY OF MAGNESIUM: CPT | Performed by: PHYSICIAN ASSISTANT

## 2018-04-04 PROCEDURE — 80048 BASIC METABOLIC PNL TOTAL CA: CPT | Performed by: NURSE PRACTITIONER

## 2018-04-04 PROCEDURE — 85027 COMPLETE CBC AUTOMATED: CPT | Performed by: PHYSICIAN ASSISTANT

## 2018-04-04 PROCEDURE — G8979 MOBILITY GOAL STATUS: HCPCS

## 2018-04-04 PROCEDURE — 93306 TTE W/DOPPLER COMPLETE: CPT

## 2018-04-04 PROCEDURE — 83735 ASSAY OF MAGNESIUM: CPT | Performed by: NURSE PRACTITIONER

## 2018-04-04 PROCEDURE — 97163 PT EVAL HIGH COMPLEX 45 MIN: CPT

## 2018-04-04 PROCEDURE — 93005 ELECTROCARDIOGRAM TRACING: CPT

## 2018-04-04 PROCEDURE — G8988 SELF CARE GOAL STATUS: HCPCS

## 2018-04-04 PROCEDURE — 85610 PROTHROMBIN TIME: CPT | Performed by: PHYSICIAN ASSISTANT

## 2018-04-04 PROCEDURE — 93005 ELECTROCARDIOGRAM TRACING: CPT | Performed by: PHYSICIAN ASSISTANT

## 2018-04-04 PROCEDURE — 97530 THERAPEUTIC ACTIVITIES: CPT

## 2018-04-04 PROCEDURE — 99232 SBSQ HOSP IP/OBS MODERATE 35: CPT | Performed by: EMERGENCY MEDICINE

## 2018-04-04 PROCEDURE — 71045 X-RAY EXAM CHEST 1 VIEW: CPT

## 2018-04-04 RX ORDER — METOPROLOL TARTRATE 50 MG/1
100 TABLET, FILM COATED ORAL EVERY EVENING
Status: DISCONTINUED | OUTPATIENT
Start: 2018-04-04 | End: 2018-04-05

## 2018-04-04 RX ORDER — INSULIN GLARGINE 100 [IU]/ML
30 INJECTION, SOLUTION SUBCUTANEOUS EVERY 12 HOURS SCHEDULED
Status: DISCONTINUED | OUTPATIENT
Start: 2018-04-04 | End: 2018-04-06 | Stop reason: HOSPADM

## 2018-04-04 RX ORDER — MAGNESIUM SULFATE HEPTAHYDRATE 40 MG/ML
2 INJECTION, SOLUTION INTRAVENOUS ONCE
Status: COMPLETED | OUTPATIENT
Start: 2018-04-04 | End: 2018-04-05

## 2018-04-04 RX ORDER — METOPROLOL TARTRATE 50 MG/1
50 TABLET, FILM COATED ORAL EVERY 12 HOURS SCHEDULED
Status: DISCONTINUED | OUTPATIENT
Start: 2018-04-04 | End: 2018-04-04

## 2018-04-04 RX ORDER — FUROSEMIDE 40 MG/1
40 TABLET ORAL
Status: DISCONTINUED | OUTPATIENT
Start: 2018-04-04 | End: 2018-04-06 | Stop reason: HOSPADM

## 2018-04-04 RX ORDER — METOPROLOL TARTRATE 50 MG/1
50 TABLET, FILM COATED ORAL EVERY MORNING
Status: DISCONTINUED | OUTPATIENT
Start: 2018-04-05 | End: 2018-04-05

## 2018-04-04 RX ORDER — WARFARIN SODIUM 1 MG/1
3 TABLET ORAL
Status: DISCONTINUED | OUTPATIENT
Start: 2018-04-04 | End: 2018-04-06 | Stop reason: HOSPADM

## 2018-04-04 RX ADMIN — FUROSEMIDE 40 MG: 40 TABLET ORAL at 16:20

## 2018-04-04 RX ADMIN — INSULIN LISPRO 3 UNITS: 100 INJECTION, SOLUTION INTRAVENOUS; SUBCUTANEOUS at 01:54

## 2018-04-04 RX ADMIN — VITAMIN D, TAB 1000IU (100/BT) 1000 UNITS: 25 TAB at 09:38

## 2018-04-04 RX ADMIN — SODIUM CHLORIDE 7 MG/HR: 0.9 INJECTION, SOLUTION INTRAVENOUS at 07:59

## 2018-04-04 RX ADMIN — CHLORHEXIDINE GLUCONATE 15 ML: 1.2 RINSE ORAL at 09:39

## 2018-04-04 RX ADMIN — METOPROLOL TARTRATE 50 MG: 50 TABLET ORAL at 09:38

## 2018-04-04 RX ADMIN — POLYETHYLENE GLYCOL 3350 17 G: 17 POWDER, FOR SOLUTION ORAL at 09:37

## 2018-04-04 RX ADMIN — LORATADINE 10 MG: 10 TABLET ORAL at 09:38

## 2018-04-04 RX ADMIN — MUPIROCIN 1 APPLICATION: 20 OINTMENT TOPICAL at 09:38

## 2018-04-04 RX ADMIN — INSULIN LISPRO 2 UNITS: 100 INJECTION, SOLUTION INTRAVENOUS; SUBCUTANEOUS at 06:05

## 2018-04-04 RX ADMIN — CHLORHEXIDINE GLUCONATE 15 ML: 1.2 RINSE ORAL at 17:55

## 2018-04-04 RX ADMIN — ACETAMINOPHEN 650 MG: 325 TABLET, FILM COATED ORAL at 00:55

## 2018-04-04 RX ADMIN — METOPROLOL TARTRATE 100 MG: 50 TABLET ORAL at 17:55

## 2018-04-04 RX ADMIN — ASPIRIN 81 MG 81 MG: 81 TABLET ORAL at 09:38

## 2018-04-04 RX ADMIN — INSULIN LISPRO 3 UNITS: 100 INJECTION, SOLUTION INTRAVENOUS; SUBCUTANEOUS at 16:18

## 2018-04-04 RX ADMIN — INSULIN LISPRO 5 UNITS: 100 INJECTION, SOLUTION INTRAVENOUS; SUBCUTANEOUS at 11:57

## 2018-04-04 RX ADMIN — SODIUM CHLORIDE 8 MG/HR: 0.9 INJECTION, SOLUTION INTRAVENOUS at 01:06

## 2018-04-04 RX ADMIN — MAGNESIUM SULFATE HEPTAHYDRATE 2 G: 40 INJECTION, SOLUTION INTRAVENOUS at 01:42

## 2018-04-04 RX ADMIN — LEVOTHYROXINE SODIUM 100 MCG: 100 TABLET ORAL at 06:00

## 2018-04-04 RX ADMIN — FUROSEMIDE 40 MG: 40 TABLET ORAL at 09:38

## 2018-04-04 RX ADMIN — ATORVASTATIN CALCIUM 80 MG: 80 TABLET, FILM COATED ORAL at 16:20

## 2018-04-04 RX ADMIN — INSULIN GLARGINE 30 UNITS: 100 INJECTION, SOLUTION SUBCUTANEOUS at 09:39

## 2018-04-04 RX ADMIN — PANTOPRAZOLE SODIUM 40 MG: 40 TABLET, DELAYED RELEASE ORAL at 06:00

## 2018-04-04 RX ADMIN — WARFARIN SODIUM 3 MG: 1 TABLET ORAL at 17:55

## 2018-04-04 RX ADMIN — CEFAZOLIN SODIUM 2000 MG: 2 SOLUTION INTRAVENOUS at 09:37

## 2018-04-04 RX ADMIN — INSULIN GLARGINE 30 UNITS: 100 INJECTION, SOLUTION SUBCUTANEOUS at 21:16

## 2018-04-04 RX ADMIN — MUPIROCIN 1 APPLICATION: 20 OINTMENT TOPICAL at 21:16

## 2018-04-04 NOTE — PROGRESS NOTES
Progress Note - Cardiothoracic Surgery   Mary Anne Martínez [de-identified] y o  female MRN: [id number]  Unit/Bed#: [facility] 417-01 Encounter: [id number]      POD # 1 s/p TAVR    Pt seen/examined  Interval history and data reviewed with ICU team   Pt doing well  No specific complaints          Medications:   Scheduled Meds:  Current Facility-Administered Medications:  acetaminophen 650 mg Rectal Q4H PRN Amando Hernandez PA-C    acetaminophen 650 mg Oral Q4H PRN Amando Hernandez PA-C    albuterol 2 puff Inhalation BID PRN Debbie Roldan PA-C    aspirin 81 mg Oral Daily Amando Hernandez PA-C    atorvastatin 80 mg Oral Daily With KeySMAGDALENO lindsay    bisacodyl 10 mg Rectal Daily PRN Amando Hernandez PA-C    cefazolin 2,000 mg Intravenous Q8H Amando Hernandez PA-C Last Rate: Stopped (04/04/18 0110)   chlorhexidine 15 mL Swish & Spit BID Amando Hernandez PA-C    cholecalciferol 1,000 Units Oral Daily Amando Hernandez PA-C    fentanyl citrate (PF) 50 mcg Intravenous Once Debbie Roldan PA-C    fentanyl citrate (PF) 50 mcg Intravenous Q1H PRN Amando Hernandez PA-C    furosemide 40 mg Oral BID (diuretic) [facility]    heparin (porcine) 5,000 Units Subcutaneous Q8H [facility] Amando Hernandez PA-C    insulin glargine 30 Units Subcutaneous Q12H [facility] Ashlie James PA-C    insulin lispro 1-6 Units Subcutaneous TID AC Amando Hernandez PA-C    levothyroxine 100 mcg Oral Early Morning Amando Hernandez PA-C    loratadine 10 mg Oral Daily Amando Hernandez PA-C    metoprolol tartrate 50 mg Oral Q12H [facility]    multi-electrolyte 50 mL/hr Intravenous Continuous Debbie Roldan PA-C Last Rate: 50 mL/hr (04/04/18 0400)   mupirocin 1 application Nasal Y01D [facility] Amando Hernandez PA-C    niCARdipine 2 5-15 mg/hr Intravenous Titrated Jair Hernandez PA-C Last Rate: 7 mg/hr (04/04/18 7610)   nitroglycerin 0 4 mg Sublingual Q3 min PRN Amando Hernandez PA-C    ondansetron 4 mg Intravenous Q6H PRN Amando Hernandez PA-C    pantoprazole 40 mg Oral Early Morning Amando Hernandez PA-C    polyethylene glycol 17 g Oral Daily Amando Hernandez PA-C    potassium chloride 20 mEq Intravenous Q1H PRN Amando Hernandez PA-C    potassium chloride 20 mEq Intravenous Q30 Min PRN Amando Hernandez PA-C      Continuous Infusions:  multi-electrolyte 50 mL/hr Last Rate: 50 mL/hr (04/04/18 0400)   niCARdipine 2 5-15 mg/hr Last Rate: 7 mg/hr (04/04/18 0759)       Vitals: Blood pressure 170/82, pulse 86, temperature 97 5 °F (36 4 °C), temperature source Probe, resp  rate (!) 29, height 5' 4" (1 626 m), weight 75 3 kg (166 lb), SpO2 98 %, not currently breastfeeding  ,Body mass index is 28 49 kg/m²  I/O last 24 hours: In: 2419 [I V :2269; IV Piggyback:150]  Out: 1260 [Urine:1260]  Invasive Devices     Central Venous Catheter Line            CVC Central Lines 04/03/18 Triple less than 1 day          Arterial Line            Arterial Line 04/03/18 Left Brachial less than 1 day          Drain            Urethral Catheter Non-latex; Temperature probe 16 Fr  less than 1 day                Physical Exam:   AAOx3  NAD  RRR  CTA B/L  Abd soft  Ext's no edema    Lab, Imaging and other studies:     Results from last 7 days  Lab Units 04/04/18  0411 04/03/18 2005 04/03/18  1213 04/03/18  1210  03/29/18  0924   WBC Thousand/uL 12 62*  --   --   --   --  8 88   HEMOGLOBIN g/dL 8 0* 8 5*  --  8 9*  --  10 2*   I STAT HEMOGLOBIN g/dl  --   --  9 2*  --   < >  --    HEMATOCRIT % 27 0* 27 5*  --  29 8*  --  34 1*   PLATELETS Thousands/uL 230  --   --  250  --  280   < > = values in this interval not displayed      Results from last 7 days  Lab Units 04/04/18  0411 04/04/18  0108 04/03/18 2005 04/03/18  1210   SODIUM mmol/L 139 139  --   --  138   POTASSIUM mmol/L 4 0 4 1 4 1  < > 3 9   CHLORIDE mmol/L 106 106  --   --  105   CO2 mmol/L 25 25  --   --  24   BUN mg/dL 22 23  --   --  26*   CREATININE mg/dL 0 86 1 01  -- --  1 08   GLUCOSE RANDOM mg/dL 232* 278*  --   --  259*   GLUCOSE, ISTAT   --   --   --   < >  --    CALCIUM mg/dL 7 5* 7 4*  --   --  6 7*   < > = values in this interval not displayed  Results from last 7 days  Lab Units 04/04/18  0411 04/03/18  1328 03/29/18  0924   INR  1 41* 1 51* 2 37*     No results for input(s): PHART, PWA3ROB, PO2ART, CHG6BVT, D9MFNCNA, BEART in the last 72 hours  Plan:    PRIYANKA Maldonado/Rachel/Nickie/Anny  Transfer to floor  Ambulate  Incentive spirometry  Diuresis  PO ASA/Statin/B blocker  Check Echo          SIGNATURE: Abdoulaye Downing MD  DATE: April 4, 2018  TIME: 8:17 AM

## 2018-04-04 NOTE — PLAN OF CARE
Problem: PHYSICAL THERAPY ADULT  Goal: Performs mobility at highest level of function for planned discharge setting  See evaluation for individualized goals  Treatment/Interventions: Functional transfer training, LE strengthening/ROM, Elevations, Therapeutic exercise, Endurance training, Bed mobility, Gait training, Spoke to nursing, OT, Equipment eval/education  Equipment Recommended: Shaina Douglas       See flowsheet documentation for full assessment, interventions and recommendations  Prognosis: Good  Problem List: Decreased strength, Decreased endurance, Impaired balance, Decreased mobility  Assessment: Pt is [de-identified] y o  admitted with Dx of Critical aortic valve stenosis and underwent transcatheter aortic valve replacement via left transfemoral approach on 4/3/2018  Pt 's comorbidities affecting POC include: CAD s/p CABG, diastolic CHF with EF 91%, PAF on coumadin, DM on insulin, CKD, PAD, HTN, asthma, concussion, Lyme dz, spinal stenosis and personal factors of: living alone and JUSTINO  Pt's clinical presentation is currently unstable/unpredictable which is evident in ongoing telem monitoring w/ a-line in place while in the critical care unit (DASH is used), abn lab values being monitored, and irreg and occasionally elevated HR noted during mobilization  Pt presents w/ generalized weakness, incl decreased LE strength, decreased functional endurance, impaired balance and gait deviations requiring use of rw at this time and fall risk  Will cont to follow pt in PT for progressive mobilization to address above functional deficits and to max level of (I), endurance, and safety  Otherwise, anticipate pt will return home w/ available family support upon D/C provided she cont improving w/ mobility skills, safety, and endurance (incl on the steps) and when medically cleared; home PT follow up is recommended; will follow    Barriers to Discharge: Inaccessible home environment, Decreased caregiver support     Recommendation: Home PT          See flowsheet documentation for full assessment

## 2018-04-04 NOTE — PLAN OF CARE
Problem: OCCUPATIONAL THERAPY ADULT  Goal: Performs self-care activities at highest level of function for planned discharge setting  See evaluation for individualized goals  Treatment Interventions: ADL retraining, Functional transfer training, Endurance training, Cognitive reorientation, Patient/family training, Equipment evaluation/education, Compensatory technique education, Continued evaluation, Cardiac education, Energy conservation, Activityengagement          See flowsheet documentation for full assessment, interventions and recommendations  Limitation: Decreased ADL status, Decreased Safe judgement during ADL, Decreased endurance, Decreased fine motor control, Decreased self-care trans, Decreased high-level ADLs, Decreased UE strength  Prognosis: Fair  Assessment: Pt is a [de-identified] y/o female seen for OT eval s/p adm to [facility] w/ SOB/IGNACIO, has history of aortic stenosis and receiving cardiac surg eval- pt is now s/p TAVR dx'd w/ critical aortic valve stenosis  Comorbidities include a h/o CAD/MSTEMI s/p CABG x 6 (2004 at [facility]), HTN, hyperlipidemia, PAD (h/o non-healing L medial great toe ulcer), DM type 2 (insulin), PAF, hypothyroidism, asthma and GERD, altered mental status, CHF, DJD , HLD , lyme disease, Mi, migraines, OA , spinal stenosis, trigger ring finger, CABG 2004  Pt with active OT orders and up as tolerated  Pt lives alone in 2 story home w/ 5 JUSTINO and 1st floor setup   Pt was I w/  ADLS and IADLS, drove, & required use of DME PTA including walker, cane, commode and shower chair  Pt is currently demonstrating the following occupational deficits: S UB ADLS, and S LB ADLS, Min A transfers and functional mobility w/ RW    Pt with deficits and limitations in all baseline areas of occupation 2* cardiac precautions, decreased endurance, fatigue,decreased activity tolerance, decreased functional mobility, decreased strength, decreased safety awareness, decreased supportive home environment, and decreased I w/ ADLS and IADLS compared to previous level of functioning   The following Occupational Performance Areas to address include: eating, grooming, bathing/shower, toilet hygiene, dressing, medication management, socialization, health maintenance, functional mobility, community mobility, clothing management, cleaning, meal prep, money management, household maintenance and social participation  Pt scored overall 55/100 on the Barthel Index  Based on the aforementioned OT evaluation, functional performance deficits, and assessments, pt has been identified as a high complexity evaluation  Recommend pt d/c home w/ Home OT when medically stable w/ increased family support  Pt to continue to benefit from acute immediate OT services to address the following goals 3-5x/wk to  w/in 7-10 days:     Pt participated in additional OT session focusing on cardiac education  Provided pt with Recovering After Cardiac Surgery packet and educated pt regarding; sternal precautions, cardiac precautions, lifiting restrictions, safe activity engagement, energy conservation, lifestyle modifications, stress management and cardiac rehabilitation programs  Pt's questions were addressed after discussion of the packet  Provided Pt with contact information for OT department if questions arrise  Pt continues to benefit from inpatient skilled OT services  Will continue to follow and address previously stated goals       OT Discharge Recommendation: Home OT  OT - OK to Discharge: Yes (when medically stable)      Comments: Bee BEAN, OTR/L

## 2018-04-04 NOTE — PROGRESS NOTES
Progress Note - Critical Care  Matilde Ackerman [de-identified] y o  female MRN: [id number]  Unit/Bed#: [facility] 417-01 Encounter: [id number]    Attending Physician: Carlitos Aguilar,     24 Hour Events: POD # 1 s/p TAVR  Overnight, patient had a run of radid afib with aberrancy (underlying LBBB) with rate in the 120s  Was hypertensive at this time as well; Cardene had been off but was restarted  She was c/o three short episodes of chest pain at this time  BMP/Mg++ checked and Mg++ of 1 4 was repleted  Converted to Sr  Remains NSR in the 70s and is feeling well currently  Cardene running at 6mg/hour  She has remained hyperglycemic  10u Lantus last night with addition of 3u of regular insuline  Home dose of Lantus 30u BID ordered starting this morning      Medications:   Scheduled Meds:    Current Facility-Administered Medications:  acetaminophen 650 mg Rectal Q4H PRN Amando Hernandez PA-C    acetaminophen 650 mg Oral Q4H PRN Amando Hernandez PA-C    albuterol 2 puff Inhalation BID PRN Sánchez Quan PA-C    aspirin 81 mg Oral Daily Amando Hernandez PA-C    atorvastatin 80 mg Oral Daily With MAGDALENO Hamilton    bisacodyl 10 mg Rectal Daily PRN Amando Hernandez PA-C    cefazolin 2,000 mg Intravenous Q8H Amando Hernandez PA-C Last Rate: Stopped (04/04/18 0110)   chlorhexidine 15 mL Swish & Spit BID Amando Hernandez PA-C    cholecalciferol 1,000 Units Oral Daily Amando Hernandez PA-C    fentanyl citrate (PF) 50 mcg Intravenous Once Sánchez Quan PA-C    fentanyl citrate (PF) 50 mcg Intravenous Q1H PRN Amando Hernandez PA-C    heparin (porcine) 5,000 Units Subcutaneous Q8H [facility] Amando Hernandez PA-C    insulin glargine 30 Units Subcutaneous Q12H [facility] Ashlie James PA-C    insulin lispro 1-6 Units Subcutaneous TID AC Amando Hernandez PA-C    levothyroxine 100 mcg Oral Early Morning Amando Hernandez PA-C    loratadine 10 mg Oral Daily Sánchez Quan PA-C    multi-electrolyte 50 mL/hr Intravenous Continuous Boo Bowen PA-C Last Rate: 50 mL/hr (18 0400)   mupirocin 1 application Nasal [address] [place] [address] & [facility] Amando Hernandez PA-C    niCARdipine 2 5-15 mg/hr Intravenous Titrated Boo Bowen PA-C Last Rate: 6 mg/hr (18 9780)   nitroglycerin 0 4 mg Sublingual Q3 min PRN Amando Hernandez PA-C    ondansetron 4 mg Intravenous Q6H PRN Amando Hernandez PA-C    pantoprazole 40 mg Oral Early Morning Amando Hernandez PA-C    polyethylene glycol 17 g Oral Daily Amnado Hernandez PA-C    potassium chloride 20 mEq Intravenous Q1H PRN Amando Hernandez PA-C    potassium chloride 20 mEq Intravenous Q30 Min PRN Boo Bowen PA-C        VTE Pharmacologic Prophylaxis: Heparin  VTE Mechanical Prophylaxis: sequential compression device    Continuous Infusions:    multi-electrolyte 50 mL/hr Last Rate: 50 mL/hr (18 040)   niCARdipine 2 5-15 mg/hr Last Rate: 6 mg/hr (18 0619)     PRN Meds:    acetaminophen 650 mg Q4H PRN   acetaminophen 650 mg Q4H PRN   albuterol 2 puff BID PRN   bisacodyl 10 mg Daily PRN   fentanyl citrate (PF) 50 mcg Q1H PRN   nitroglycerin 0 4 mg Q3 min PRN   ondansetron 4 mg Q6H PRN   potassium chloride 20 mEq Q1H PRN   potassium chloride 20 mEq Q30 Min PRN       Vitals:   Vitals:    18 0100 18 0200 18 0400 18 0600   BP: 170/82      Pulse: 96 86 60 86   Resp: (!) 23 19 19 (!) 29   Temp: 97 5 °F (36 4 °C) 97 9 °F (36 6 °C) 97 9 °F (36 6 °C) 97 5 °F (36 4 °C)   TempSrc:  Probe Probe Probe   SpO2: 97% 95% 98% 98%   Weight:       Height:         Arterial Line BP: 148/56  Arterial Line MAP (mmHg): 90 mmHg    Tele Rhythm: NSR This was personally reviewed by myself      Respiratory:  SpO2: SpO2: 98 %  O2 Flow Rate (L/min): 2 L/min    Temperature: Temp (24hrs), Av 6 °F (36 4 °C), Min:96 4 °F (35 8 °C), Max:98 6 °F (37 °C)  Current: Temperature: 97 5 °F (36 4 °C)    Weights:   Weight (last 2 days)     Date/Time   Weight    18 0621 75 3 (166)            IBW: 54 7 kg  Body mass index is 28 49 kg/m²  Hemodynamic Monitoring:  N/A       Intake and Outputs:    Intake/Output Summary (Last 24 hours) at 04/04/18 0718  Last data filed at 04/04/18 0600   Gross per 24 hour   Intake          2418 99 ml   Output             1260 ml   Net          1158 99 ml     I/O last 24 hours: In: 2419 [I V :2269; IV Piggyback:150]  Out: 1260 [Urine:1260]    UOP: 0 7cc/kg/hour     Labs:    Results from last 7 days  Lab Units 04/04/18 0411 04/03/18 2005 04/03/18 1213 04/03/18 1210 03/29/18  0924   WBC Thousand/uL 12 62*  --   --   --   --  8 88   HEMOGLOBIN g/dL 8 0* 8 5*  --  8 9*  --  10 2*   I STAT HEMOGLOBIN g/dl  --   --  9 2*  --   < >  --    HEMATOCRIT % 27 0* 27 5*  --  29 8*  --  34 1*   PLATELETS Thousands/uL 230  --   --  250  --  280   NEUTROS PCT %  --   --   --   --   --  63   MONOS PCT %  --   --   --   --   --  10   < > = values in this interval not displayed  Results from last 7 days  Lab Units 04/04/18 0411 04/04/18 0108 04/03/18 2005 04/03/18 1213 04/03/18  1210 03/29/18  0924   SODIUM mmol/L 139 139  --   --   --  138  --  137   POTASSIUM mmol/L 4 0 4 1 4 1  < >  --  3 9  --  4 7   CHLORIDE mmol/L 106 106  --   --   --  105  --  100   CO2 mmol/L 25 25  --   --   --  24  --  29   BUN mg/dL 22 23  --   --   --  26*  --  29*   CREATININE mg/dL 0 86 1 01  --   --   --  1 08  --  0 98   CALCIUM mg/dL 7 5* 7 4*  --   --   --  6 7*  --  8 2*   TOTAL PROTEIN g/dL  --   --   --   --   --   --   --  7 0   BILIRUBIN TOTAL mg/dL  --   --   --   --   --   --   --  0 64   ALK PHOS U/L  --   --   --   --   --   --   --  71   ALT U/L  --   --   --   --   --   --   --  24   AST U/L  --   --   --   --   --   --   --  25   GLUCOSE RANDOM mg/dL 232* 278*  --   --   --  259*  --   --    GLUCOSE, ISTAT mg/dl  --   --   --   --  274*  --   < >  --    < > = values in this interval not displayed      Baseline creat 0 93      Results from last 7 days  Lab Units 04/04/18  0411 04/04/18  0108   MAGNESIUM mg/dL 2 2 1 4*       Results from last 7 days  Lab Units 04/04/18  0411 04/03/18  1328 03/29/18  0924   INR  1 41* 1 51* 2 37*     CXR: R IJ CVC well-positioned  No PTX  Cardiomegaly  No acute abnormality  This was personally reviewed by myself  EKG: NSR with 1st degree AVB and LBBB  This was personally reviewed by myself  Physical Exam:    General Appearance:  Sitting up in chair, NAD, well-appearing  HENT: Atraumatic without obvious abnormality  Eyes: No icterus  Cardiac: Normal rate and regular rhythm, split S2, no murmur, no rub  Pulmonary: Clear to auscultation bilaterally, no cough, no secretions  Gastrointestinal: Soft, no distention,  Non-tender  : Mccormick present: yes   Musculoskeletal: No edema bilaterally  Dopplerable bilateral DP pulses  Neuro:  A&O x 3  No focal deficit  Psych: Mood and affect normal     Skin: warm and dry  Bilateral toes are slightly red and cool, but dopplerable pulses and known severe PAD  Incisions: Bilateral femoral sites are dressed with guaze, no drainage, soft to palpation without tenderness  Invasive lines and devices: Invasive Devices     Central Venous Catheter Line            CVC Central Lines 04/03/18 Triple less than 1 day          Arterial Line            Arterial Line 04/03/18 Left Brachial less than 1 day          Drain            Urethral Catheter Non-latex; Temperature probe 16 Fr  less than 1 day                Assessment:  Principal Problem:    Critical aortic valve stenosis  Active Problems:    Essential hypertension    CAD (coronary artery disease)    Type 2 diabetes mellitus with complication, with long-term current use of insulin (HCC)    Atrial flutter (HCC)    Gastroesophageal reflux disease without esophagitis    Moderate mitral stenosis    Atherosclerosis of artery of extremity with ulceration (HCC)    Hx of CABG    Chronic anticoagulation    Chronic diastolic congestive heart failure (HCC)    Chronic systolic heart failure (HCC)    Ulcer of toe of left foot (HCC)    Ulcer of toe of right foot (HCC)    Hypokalemia    Hypocalcemia    Acute respiratory insufficiency, postoperative    Left bundle branch block (LBBB)    1st degree AV block      Plan:    Cardiac:   NSR; Hypertensive  Start lopressor 25 mg PO BID  Also on lisinopril 5mg po daily  Continue ASA and statin therapy  ASA/Coumadin  INR 1 41  Coumadin 3 mg PO today  On at home for a-flutter  Consult cardiology for postoperative medical management  Follow up transthoracic echocardiogram today  Continue DVT prophylaxis    Pulmonary:   Extubated  Post op resp insufficiency: wean O2, deep breathing, incentive spirometry  Renal:   Trend BUN and creat  Trend UOP   Remove Mccormick  Lasix 40mg po daily    Neuro:  Neurologically intact; No active issues  Incisional pain well-controlled; Continue prn Tylenol    Gi:   Tolerating cardiac TLC 2 3 gm sodium diet with 1800 mL fluid restriction  Continue bowel regimen  On PPI at home  Continue Protonix    Endo:    Re-start home Lantus 30u Q 12 H   Was on prednisone 60mg evening prior and morning of her recent CTA d/t contrast allergy  Not on routinely  Hematology:   Post-operative acute blood loss anemia; Hemoglobin and hematocrit stable; trend PRN    Disposition:  Transfer from ICU to telemetry today  Discharge home tomorrow when postoperative echocardiogram clear    Collaborative bedside rounds performed with cardiac surgery attending and bedside RN      SIGNATURE: Zelda Vail PA-C  DATE: April 4, 2018  TIME: 7:18 AM

## 2018-04-04 NOTE — OCCUPATIONAL THERAPY NOTE
[address]  Evaluation     Patient Name: Leonel Turner Date: 4/4/2018  Problem List  Patient Active Problem List   Diagnosis    Essential hypertension    Critical aortic valve stenosis    CAD (coronary artery disease)    Type 2 diabetes mellitus with complication, with long-term current use of insulin (HCC)    Atrial flutter (HCC)    Hyperlipidemia    Gastroesophageal reflux disease without esophagitis    Moderate mitral stenosis    LEONARD (acute kidney injury) ([address] )    Arthritis of hand    Acute cardiogenic pulmonary edema (HCC)    Asthma    Asymptomatic carotid artery stenosis, bilateral    Atherosclerosis of artery of extremity with ulceration ([address] )    Hx of CABG    Chronic anticoagulation    Chronic diastolic congestive heart failure (HCC)    Chronic systolic heart failure (HCC)    Degenerative joint disease involving multiple joints    Diabetic retinopathy (HCC)    Postoperative hypothyroidism    Migraine headache    Nephrolithiasis    Osteoarthritis of both knees    Persistent atrial fibrillation (HCC)    Ulcer of toe of left foot (HCC)    Ulcer of toe of right foot ([address] )    Acute on chronic diastolic CHF (congestive heart failure) ([address] )    Prolonged Q-T interval on ECG   [facility] discharge follow-up    Hypokalemia    Hypocalcemia    Acute respiratory insufficiency, postoperative    Left bundle branch block (LBBB)    1st degree AV block     Past Medical History  Past Medical History:   Diagnosis Date    Altered mental status     Anticoagulated     Coumadin for Aflutter    Asthma     Atrial flutter (HCC)     maintained on coumadin anticoag    CAD (coronary artery disease)     Candidiasis     Carotid stenosis, bilateral     Cataract     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) ([facility])     Chronic fatigue syndrome     Chronic low back pain     Concussion w loss of consciousness of unsp duration, init     Coronary artery disease     Diabetes mellitus ([facility] )     type 2, insulin dependent    DJD (degenerative joint disease)     GERD (gastroesophageal reflux disease)     Herpes zoster     HLD (hyperlipidemia)     HTN (hypertension)     Hyperlipidemia     Hypothyroidism     Irritable bowel syndrome     Lumbar radiculopathy     Lyme disease     Dx in hospital 8/2011    Lyme disease     MI (myocardial infarction)     Migraines     Muscle spasm     Non-neoplastic nevus     Nontoxic multinodular goiter     OA (osteoarthritis)     Osteoarthritis     Other chronic pain     PAD (peripheral artery disease) (HCC)     Palpitations     Pseudogout     Spinal stenosis     Transient cerebral ischemia     Trigger ring finger     Viral gastroenteritis      Past Surgical History  Past Surgical History:   Procedure Laterality Date    APPENDECTOMY      ARTERIOGRAM  12/19/2017    CARDIAC CATHETERIZATION      CHOLECYSTECTOMY      COLONOSCOPY      CORONARY ARTERY BYPASS GRAFT  2004    LUMBAR LAMINECTOMY      GA ECHO TRANSESOPHAG R-T 2D W/PRB IMG ACQUISJ I&R N/A 4/3/2018    Procedure: TRANSESOPHAGEAL ECHOCARDIOGRAM (ROBB); Surgeon: Inna aMtute DO;  Location: [facility] MA[facility];  Service: Cardiac Surgery    GA REPLACE AORTIC VALVE OPENFEMORAL ARTERY APPROACH N/A 4/3/2018    Procedure: REPLACEMENT AORTIC VALVE TRANSCATHETER (TAVR) TRANSFEMORAL w/ 26MM IVY YEVGENIY S3 VALVE;  Surgeon: Inna Matute DO;  Location: [facility];  Service: Cardiac Surgery    THYROIDECTOMY      TOTAL ABDOMINAL HYSTERECTOMY W/ BILATERAL SALPINGOOPHORECTOMY             04/04/18 1136   Note Type   Note type Eval/Treat   Restrictions/Precautions   Weight Bearing Precautions Per Order No   Braces or Orthoses Other (Comment)  (diabetic shoes)   Other Precautions Cardiac/sternal;Multiple lines;Telemetry; Fall Risk;Pain   Pain Assessment   Pain Assessment No/denies pain   Home Living   Type of [address] Two level;Stairs to enter with rails; Able to live on main level with bedroom/bathroom   Bathroom Shower/Tub Tub/shower unit   Bathroom Toilet Standard   Bathroom Equipment Shower chair;Commode;Grab bars in [facility] Walker;Cane   Additional Comments Pt reports living in 2 story home w/ 5 JUSTINO and 1st floor setup   Prior Function   Level of [place] Independent with ADLs and functional mobility   Lives With Alone   Receives Help From Family   ADL Assistance Independent   IADLs Independent   Vocational Retired   Comments Pt reports being I w/ ADLS, IADLS, transfers and functional mobility PTA   Lifestyle   Autonomy Pt reports being I w/ ADLS, IADLS, transfers and functional mobility PTA   Reciprocal Relationships Pt lives alone; has children nearby   Service to Others Pt is retired   Intrinsic Gratification Pt reports enjoying watching TV   Psychosocial   Psychosocial (WDL) [address]  7  Independent   Grooming Assistance 7  [address]  5  Supervision/Setup    [place] [address]  [address]  5  [id number] [facility] 4  Minimal Assistance   Bed Mobility   Additional Comments Unable to assess, pt seated in chair prior to and end of session   Transfers   Sit to Stand 4  Minimal assistance   Additional items Assist x 1; Increased time required;Verbal cues;Armrests   Stand to Sit 4  Minimal assistance   Additional items Assist x 1; Increased time required;Verbal cues;Armrests   Additional Comments Pt performed sit-stand from chair w/ MIn A x1 for mild force production into standing and VC for proper hand placement on RW   Functional Mobility   Functional Mobility 4  Minimal assistance   Additional Comments Pt ambulated short distance w/ MIn A x1 for safety and balance- RW in standing for support and stability   Upon ambulation pt heartrate spiked intermittently to 115-130 but then returned to 80's w/ standing rest break   Additional items Rolling walker   Balance   Static Sitting Fair +   Dynamic Sitting Fair   Static Standing Fair -   Ambulatory Fair -   Activity Tolerance   Activity Tolerance Patient limited by fatigue   Medical Staff Made Aware PT, Dereje   Nurse Made Aware yes   RUE Assessment   RUE Assessment WFL   LUE Assessment   LUE Assessment WFL   Hand Function   Gross Motor Coordination Functional   Fine Motor Coordination Functional   Cognition   Overall Cognitive Status WFL   Arousal/Participation Alert; Responsive; Cooperative   Attention Within functional limits   Orientation Level Oriented X4   Memory Within functional limits   Following Commands Follows one step commands without difficulty   Comments Pt is pleasant and cooperative   Assessment   Limitation Decreased ADL status; Decreased Safe judgement during ADL;Decreased endurance;Decreased fine motor control;Decreased self-care trans;Decreased high-level ADLs; Decreased UE strength   Prognosis Fair   Assessment Pt is a [de-identified] y/o female seen for OT eval s/p adm to [facility] w/ SOB/IGNACIO, has history of aortic stenosis and receiving cardiac surg eval- pt is now s/p TAVR dx'd w/ critical aortic valve stenosis  Comorbidities include a h/o CAD/MSTEMI s/p CABG x 6 ([facility]), HTN, hyperlipidemia, PAD (h/o non-healing L medial great toe ulcer), DM type 2 (insulin), PAF, hypothyroidism, asthma and GERD, altered mental status, CHF, DJD , HLD , lyme disease, Mi, migraines, OA , spinal stenosis, trigger ring finger, CABG 2004  Pt with active OT orders and up as tolerated  Pt lives alone in 2 story home w/ 5 JUSTINO and 1st floor setup   Pt was I w/  ADLS and IADLS, drove, & required use of DME PTA including walker, cane, commode and shower chair  Pt is currently demonstrating the following occupational deficits: S UB ADLS, and S LB ADLS, Min A transfers and functional mobility w/ RW    Pt with deficits and limitations in all baseline areas of occupation 2* cardiac precautions, decreased endurance, fatigue,decreased activity tolerance, decreased functional mobility, decreased strength, decreased safety awareness, decreased supportive home environment, and decreased I w/ ADLS and IADLS compared to previous level of functioning   The following Occupational Performance Areas to address include: eating, grooming, bathing/shower, toilet hygiene, dressing, medication management, socialization, health maintenance, functional mobility, community mobility, clothing management, cleaning, meal prep, money management, household maintenance and social participation  Pt scored overall 55/100 on the Barthel Index  Based on the aforementioned OT evaluation, functional performance deficits, and assessments, pt has been identified as a high complexity evaluation  Recommend pt d/c home w/ Home OT when medically stable w/ increased family support  Pt to continue to benefit from acute immediate OT services to address the following goals 3-5x/wk to  w/in 7-10 days:   Goals   Patient Goals none expressed   LTG Time Frame 7-10   Long Term Goal #1 see below listed goals   Plan   Treatment Interventions ADL retraining;Functional transfer training; Endurance training;Cognitive reorientation;Patient/family training;Equipment evaluation/education; Compensatory technique education;Continued evaluation;Cardiac education; Energy conservation; Activityengagement   Goal Expiration Date 18   OT Frequency 3-5x/wk   Additional Treatment Session   Start Time 1120   End Time 1136   Treatment Assessment Pt participated in additional OT session focusing on cardiac education  Provided pt with Recovering After Cardiac Surgery packet and educated pt regarding; sternal precautions, cardiac precautions, lifiting restrictions, safe activity engagement, energy conservation, lifestyle modifications, stress management and cardiac rehabilitation programs   Pt's questions were addressed after discussion of the packet  Provided Pt with contact information for OT department if questions arrise  Pt continues to benefit from inpatient skilled OT services  Will continue to follow and address previously stated goals  Additional Treatment Day 1   Recommendation   OT Discharge Recommendation Home OT   OT - OK to Discharge Yes  (when medically stable)   Barthel Index   Feeding 10   Bathing 5   Grooming Score 5   Dressing Score 10   Bladder Score 0   Bowels Score 10   Toilet Use Score 5   Transfers (Bed/Chair) Score 10   Mobility (Level Surface) Score 0   Stairs Score 0   Barthel Index Score 55   Modified [place] Scale   Modified Rae Scale 4        GOALS    1) Pt will improve activity tolerance to G for min 30 min txment sessions    2) Pt will complete ADLs/self care w/ mod I using adaptive device and DME as needed    3) Pt will complete toileting w/ mod I w/ G hygiene/thoroughness using DME as needed    4) Pt will improve functional transfers on/off all surfaces using DME as needed w/ G balance/safety including w/ mod I    5) Pt will improve functional mobility during ADL/IADL/leisure tasks using DME as needed w/ G balance/safety w/ mod I    6) Pt will engage in ongoing cognitive assessment w/ G participation w/ mod I to assist w/ safe d/c planning/recommendations    7) Pt will demonstrate G carryover of pt/caregiver education and training as appropriate w/ mod I w/o cues w/ good tolerance    8) Pt will demonstrate 100% carryover of energy conservation techniques w/ mod I t/o functional I/ADL/leisure tasks w/o cues s/p skilled education    9) Pt will engage in cardiac education using the Recovering After Cardiac Rehabilitation packet w/ G participation and G carryover      10) Pt will demonstrate 100% carryover of precautions s/p review w/o cues w/ mod I w/ G tolerance/participation t/o functional ADL/IADL/leisure tasks    [employer], OTR/L

## 2018-04-04 NOTE — SOCIAL WORK
[de-identified] yo female admitted on 4/3/18 for TAVR  She resides alone in [facility] and is   Below note from previous admission and was discharged on 3/20/18  CM met pt at bedside and made aware of CM role at d/c  Pt denies having a LW or POA  Primary contact is son Mary Marin  Pt reported that she lives alone in a 2 story house with 5 JUSTINO and that she has a 1st floor set up  Pt was IPTA with ADL's, drive and retired  Pt reported that she does not use any ambulatory aids  Pt has these DME's available at home: RW, cane, shower chair, bedside commode, rollator and grab bars Pt has hx with [facility] and with [facility] with [facility]  PCP is Dr Sonia Byrne and pharmacy is [facility] in [facility]  Pt denies hx with alc, drug or psych tx  Pt has transport when d/c  Pt was discharged on 3/20/18 with [facility] nsg and PT/OT but pt reports "they never came"  CM spoke with Nelly at [facility], notes state were unable to reach pt by phone and when they went to home she would not answer the door  Eventually son called and said she wouldn't let anybody in and did not need services  Case was closed  Will discuss with CTS prior to discharge  CM reviewed d/c planning process including the following: identifying help at home, patient preference for d/c planning needs, Discharge Lounge, [facility] Meds to Bed program, availability of treatment team to discuss questions or concerns patient and/or family may have regarding understanding medications and recognizing signs and symptoms once discharged  CM also encouraged patient to follow up with all recommended appointments after discharge   Patient advised of importance for patient and family to participate in managing patients medical well being

## 2018-04-04 NOTE — PHYSICAL THERAPY NOTE
Physical Therapy Evaluation     Patient's Name: Kp Shea    Admitting Diagnosis  Severe aortic stenosis [I35 0]    Problem List  Patient Active Problem List   Diagnosis    Essential hypertension    Critical aortic valve stenosis    CAD (coronary artery disease)    Type 2 diabetes mellitus with complication, with long-term current use of insulin ([facility])    Atrial flutter (HCC)    Hyperlipidemia    Gastroesophageal reflux disease without esophagitis    Moderate mitral stenosis    LEONARD (acute kidney injury) ([address] )    Arthritis of hand    Acute cardiogenic pulmonary edema ([facility])    Asthma    Asymptomatic carotid artery stenosis, bilateral    Atherosclerosis of artery of extremity with ulceration ([facility] )    Hx of CABG    Chronic anticoagulation    Chronic diastolic congestive heart failure (HCC)    Chronic systolic heart failure (HCC)    Degenerative joint disease involving multiple joints    Diabetic retinopathy ([facility])    Postoperative hypothyroidism    Migraine headache    Nephrolithiasis    Osteoarthritis of both knees    Persistent atrial fibrillation (HCC)    Ulcer of toe of left foot (HCC)    Ulcer of toe of right foot ([address] )    Acute on chronic diastolic CHF (congestive heart failure) ([facility] )    Prolonged Q-T interval on ECG   [facility] discharge follow-up    Hypokalemia    Hypocalcemia    Acute respiratory insufficiency, postoperative    Left bundle branch block (LBBB)    1st degree AV block       Past Medical History  Past Medical History:   Diagnosis Date    Altered mental status     Anticoagulated     Coumadin for Aflutter    Asthma     Atrial flutter (HCC)     maintained on coumadin anticoag    CAD (coronary artery disease)     Candidiasis     Carotid stenosis, bilateral     Cataract     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) ([facility])     Chronic fatigue syndrome     Chronic low back pain     Concussion w loss of consciousness of unsp duration, init     Coronary artery disease     Diabetes mellitus ([address] )     type 2, insulin dependent    DJD (degenerative joint disease)     GERD (gastroesophageal reflux disease)     Herpes zoster     HLD (hyperlipidemia)     HTN (hypertension)     Hyperlipidemia     Hypothyroidism     Irritable bowel syndrome     Lumbar radiculopathy     Lyme disease     Dx in hospital 8/2011    Lyme disease     MI (myocardial infarction)     Migraines     Muscle spasm     Non-neoplastic nevus     Nontoxic multinodular goiter     OA (osteoarthritis)     Osteoarthritis     Other chronic pain     PAD (peripheral artery disease) (HCC)     Palpitations     Pseudogout     Spinal stenosis     Transient cerebral ischemia     Trigger ring finger     Viral gastroenteritis        Past Surgical History  Past Surgical History:   Procedure Laterality Date    APPENDECTOMY      ARTERIOGRAM  12/19/2017    CARDIAC CATHETERIZATION      CHOLECYSTECTOMY      COLONOSCOPY      CORONARY ARTERY BYPASS GRAFT  2004    LUMBAR LAMINECTOMY      TX ECHO TRANSESOPHAG R-T 2D W/PRB IMG ACQUISJ I&R N/A 4/3/2018    Procedure: TRANSESOPHAGEAL ECHOCARDIOGRAM (ROBB); Surgeon: Mauro Haley DO;  [facility];  Service: Cardiac Surgery    TX REPLACE AORTIC VALVE OPENFEMORAL ARTERY APPROACH N/A 4/3/2018    Procedure: REPLACEMENT AORTIC VALVE TRANSCATHETER (TAVR) TRANSFEMORAL w/ 26MM IVY YEVGENIY S3 VALVE;  Surgeon: Mauro Haley DO;  [facility];  Service: Cardiac Surgery    THYROIDECTOMY      TOTAL ABDOMINAL HYSTERECTOMY W/ BILATERAL SALPINGOOPHORECTOMY                04/04/18 1123   Note Type   Note type Eval only   Pain Assessment   Pain Assessment No/denies pain   Home Living   Type of [address] Two level; Able to live on main level with bedroom/bathroom  (1st floor set-up w/ 5 JUSTINO w/ hand rail)   Home Equipment Walker;Cane   Prior Function   Level of [place] Independent with ADLs and functional mobility  (amb w/o AD; holds on to furniture)   Lives With [address] [place] Help From Family   Restrictions/Precautions   Braces or Orthoses Other (Comment)  (diabetic shoes)   Other Precautions Cardiac/sternal;Telemetry; Fall Risk;Multiple lines  (a-line)   General   Additional Pertinent History Cleared for assessment (spoke to nsg)   Cognition   Overall Cognitive Status [facility]   Arousal/Participation Alert   Attention Attends with cues to redirect   Orientation Level Oriented to person;Oriented to place;Oriented to situation   Memory Decreased recall of precautions   Following Commands Follows one step commands without difficulty   Comments Pt is observed in the chair; agreeable to demonstrate mobility skills;   RUE Assessment   RUE Assessment WFL  (AROM)   LUE Assessment   LUE Assessment WFL  (AROM)   RLE Assessment   RLE Assessment WFL  (AROM)   Strength RLE   RLE Overall Strength (fair +/ good - (grossly))   LLE Assessment   LLE Assessment WFL  (AROM)   Strength LLE   LLE Overall Strength (fair +/ good - (grossly))   Transfers   Sit to Stand 4  Minimal assistance   Additional items Assist x 1;Verbal cues   Stand to Sit 4  Minimal assistance   Additional items Assist x 1;Verbal cues   Ambulation/Elevation   Gait pattern Excessively slow; Short stride; Inconsistent kolton   Gait Assistance 4  Minimal assist   Additional items Assist x 1;Verbal cues; Tactile cues  (stand by (A) of 2nd for lines)   Assistive Device Rolling walker   Distance 2x100 ft w/ standing rest periods during and b/in bouts of amb; HR remained mainly in the 90s bpm w/ occasional increase to 100s-110s bpm and a few brief episodes of 130s-140 bpm at which point standing rest period was initiated; RN informed     Balance   Static Sitting Fair   Static Standing Fair -   Ambulatory Poor +   Activity Tolerance   Activity Tolerance Patient limited by fatigue;Treatment limited secondary to medical complications (Comment)  (irreg HR)   Nurse Made Aware spoke to [place]   Assessment   Prognosis Good   Problem List Decreased strength;Decreased endurance; Impaired balance;Decreased mobility   Assessment Pt is [de-identified] y o  admitted with Dx of Critical aortic valve stenosis and underwent transcatheter aortic valve replacement via left transfemoral approach on 4/3/2018  Pt 's comorbidities affecting POC include: CAD s/p CABG, diastolic CHF with EF 68%, PAF on coumadin, DM on insulin, CKD, PAD, HTN, asthma, concussion, Lyme dz, spinal stenosis and personal factors of: living alone and JUSTINO  Pt's clinical presentation is currently unstable/unpredictable which is evident in ongoing telem monitoring w/ a-line in place while in the critical care unit (DASH is used), abn lab values being monitored, and irreg and occasionally elevated HR noted during mobilization  Pt presents w/ generalized weakness, incl decreased LE strength, decreased functional endurance, impaired balance and gait deviations requiring use of rw at this time and fall risk  Will cont to follow pt in PT for progressive mobilization to address above functional deficits and to max level of (I), endurance, and safety  Otherwise, anticipate pt will return home w/ available family support upon D/C provided she cont improving w/ mobility skills, safety, and endurance (incl on the steps) and when medically cleared; home PT follow up is recommended; will follow  Barriers to Discharge Inaccessible home environment;Decreased caregiver support   Goals   Patient Goals none reported   STG Expiration Date 04/14/18   Short Term Goal #1 7-10 days  Pt will amb 300 ft w/ least restrictive assistive device PRN, mod (I)  Pt will negotiate 5 steps w/ hand rail and SPC PRN, mod (I)  Pt will achieve (I) level w/ bed mob  Pt will perform transfers w/ mod (I)  Plan   Treatment/Interventions Functional transfer training;LE strengthening/ROM; Elevations; Therapeutic exercise; Endurance training;Bed mobility;Gait training;Spoke to nursing;OT;Equipment eval/education   PT Frequency 5x/wk   Recommendation   Recommendation Home PT   Equipment Recommended Walker   Modified [place] Scale   Modified [place] Scale 4   Barthel Index   Feeding 10   Bathing 0   Grooming Score 5   Dressing Score 5   Bladder Score 0   Bowels Score 10   Toilet Use Score 5   Transfers (Bed/Chair) Score 10   Mobility (Level Surface) Score 10   Stairs Score 0   Barthel Index Score 55     Jacquie Lamb, PT

## 2018-04-05 LAB
ABO GROUP BLD BPU: NORMAL
ANION GAP SERPL CALCULATED.3IONS-SCNC: 4 MMOL/L (ref 4–13)
BPU ID: NORMAL
BUN SERPL-MCNC: 20 MG/DL (ref 5–25)
CALCIUM SERPL-MCNC: 7.5 MG/DL (ref 8.3–10.1)
CHLORIDE SERPL-SCNC: 104 MMOL/L (ref 100–108)
CO2 SERPL-SCNC: 30 MMOL/L (ref 21–32)
CREAT SERPL-MCNC: 0.76 MG/DL (ref 0.6–1.3)
GFR SERPL CREATININE-BSD FRML MDRD: 74 ML/MIN/1.73SQ M
GLUCOSE SERPL-MCNC: 115 MG/DL (ref 65–140)
GLUCOSE SERPL-MCNC: 122 MG/DL (ref 65–140)
GLUCOSE SERPL-MCNC: 134 MG/DL (ref 65–140)
GLUCOSE SERPL-MCNC: 153 MG/DL (ref 65–140)
GLUCOSE SERPL-MCNC: 176 MG/DL (ref 65–140)
GLUCOSE SERPL-MCNC: 227 MG/DL (ref 65–140)
HCT VFR BLD AUTO: 27.2 % (ref 34.8–46.1)
HGB BLD-MCNC: 8.3 G/DL (ref 11.5–15.4)
INR PPP: 1.46 (ref 0.86–1.16)
MAGNESIUM SERPL-MCNC: 1.6 MG/DL (ref 1.6–2.6)
MAGNESIUM SERPL-MCNC: 1.8 MG/DL (ref 1.6–2.6)
POTASSIUM SERPL-SCNC: 3.5 MMOL/L (ref 3.5–5.3)
POTASSIUM SERPL-SCNC: 4.1 MMOL/L (ref 3.5–5.3)
PROTHROMBIN TIME: 17.8 SECONDS (ref 12.1–14.4)
SODIUM SERPL-SCNC: 138 MMOL/L (ref 136–145)
UNIT DISPENSE STATUS: NORMAL
UNIT PRODUCT CODE: NORMAL
UNIT RH: NORMAL

## 2018-04-05 PROCEDURE — 85610 PROTHROMBIN TIME: CPT | Performed by: PHYSICIAN ASSISTANT

## 2018-04-05 PROCEDURE — 85014 HEMATOCRIT: CPT | Performed by: PHYSICIAN ASSISTANT

## 2018-04-05 PROCEDURE — 80048 BASIC METABOLIC PNL TOTAL CA: CPT | Performed by: PHYSICIAN ASSISTANT

## 2018-04-05 PROCEDURE — 93306 TTE W/DOPPLER COMPLETE: CPT | Performed by: INTERNAL MEDICINE

## 2018-04-05 PROCEDURE — 83735 ASSAY OF MAGNESIUM: CPT | Performed by: PHYSICIAN ASSISTANT

## 2018-04-05 PROCEDURE — 94760 N-INVAS EAR/PLS OXIMETRY 1: CPT

## 2018-04-05 PROCEDURE — 85018 HEMOGLOBIN: CPT | Performed by: PHYSICIAN ASSISTANT

## 2018-04-05 PROCEDURE — 82948 REAGENT STRIP/BLOOD GLUCOSE: CPT

## 2018-04-05 PROCEDURE — 97110 THERAPEUTIC EXERCISES: CPT

## 2018-04-05 PROCEDURE — 97535 SELF CARE MNGMENT TRAINING: CPT

## 2018-04-05 PROCEDURE — 84132 ASSAY OF SERUM POTASSIUM: CPT | Performed by: PHYSICIAN ASSISTANT

## 2018-04-05 PROCEDURE — 97530 THERAPEUTIC ACTIVITIES: CPT

## 2018-04-05 PROCEDURE — 99232 SBSQ HOSP IP/OBS MODERATE 35: CPT | Performed by: EMERGENCY MEDICINE

## 2018-04-05 RX ORDER — MAGNESIUM SULFATE 1 G/100ML
1 INJECTION INTRAVENOUS ONCE
Status: COMPLETED | OUTPATIENT
Start: 2018-04-05 | End: 2018-04-06

## 2018-04-05 RX ORDER — HYDRALAZINE HYDROCHLORIDE 20 MG/ML
10 INJECTION INTRAMUSCULAR; INTRAVENOUS ONCE
Status: COMPLETED | OUTPATIENT
Start: 2018-04-05 | End: 2018-04-05

## 2018-04-05 RX ORDER — LISINOPRIL 5 MG/1
5 TABLET ORAL
Status: DISCONTINUED | OUTPATIENT
Start: 2018-04-05 | End: 2018-04-06 | Stop reason: HOSPADM

## 2018-04-05 RX ORDER — DOCUSATE SODIUM 100 MG/1
100 CAPSULE, LIQUID FILLED ORAL 2 TIMES DAILY
Status: DISCONTINUED | OUTPATIENT
Start: 2018-04-05 | End: 2018-04-06 | Stop reason: HOSPADM

## 2018-04-05 RX ORDER — POTASSIUM CHLORIDE 20 MEQ/1
40 TABLET, EXTENDED RELEASE ORAL ONCE
Status: COMPLETED | OUTPATIENT
Start: 2018-04-05 | End: 2018-04-05

## 2018-04-05 RX ORDER — METOPROLOL TARTRATE 50 MG/1
100 TABLET, FILM COATED ORAL EVERY 12 HOURS SCHEDULED
Status: DISCONTINUED | OUTPATIENT
Start: 2018-04-05 | End: 2018-04-06 | Stop reason: HOSPADM

## 2018-04-05 RX ORDER — MAGNESIUM SULFATE HEPTAHYDRATE 40 MG/ML
2 INJECTION, SOLUTION INTRAVENOUS ONCE
Status: COMPLETED | OUTPATIENT
Start: 2018-04-05 | End: 2018-04-05

## 2018-04-05 RX ORDER — ALBUTEROL SULFATE 90 UG/1
2 AEROSOL, METERED RESPIRATORY (INHALATION) EVERY 4 HOURS PRN
Status: DISCONTINUED | OUTPATIENT
Start: 2018-04-05 | End: 2018-04-06 | Stop reason: HOSPADM

## 2018-04-05 RX ADMIN — INSULIN LISPRO 3 UNITS: 100 INJECTION, SOLUTION INTRAVENOUS; SUBCUTANEOUS at 11:51

## 2018-04-05 RX ADMIN — MUPIROCIN 1 APPLICATION: 20 OINTMENT TOPICAL at 22:36

## 2018-04-05 RX ADMIN — POLYETHYLENE GLYCOL 3350 17 G: 17 POWDER, FOR SOLUTION ORAL at 09:35

## 2018-04-05 RX ADMIN — FUROSEMIDE 40 MG: 40 TABLET ORAL at 16:54

## 2018-04-05 RX ADMIN — LISINOPRIL 5 MG: 5 TABLET ORAL at 22:37

## 2018-04-05 RX ADMIN — VITAMIN D, TAB 1000IU (100/BT) 1000 UNITS: 25 TAB at 09:34

## 2018-04-05 RX ADMIN — WARFARIN SODIUM 3 MG: 1 TABLET ORAL at 17:00

## 2018-04-05 RX ADMIN — HEPARIN SODIUM 5000 UNITS: 5000 INJECTION, SOLUTION INTRAVENOUS; SUBCUTANEOUS at 15:00

## 2018-04-05 RX ADMIN — METOPROLOL TARTRATE 100 MG: 50 TABLET ORAL at 11:53

## 2018-04-05 RX ADMIN — INSULIN GLARGINE 30 UNITS: 100 INJECTION, SOLUTION SUBCUTANEOUS at 22:35

## 2018-04-05 RX ADMIN — INSULIN LISPRO 1 UNITS: 100 INJECTION, SOLUTION INTRAVENOUS; SUBCUTANEOUS at 16:56

## 2018-04-05 RX ADMIN — CHLORHEXIDINE GLUCONATE 15 ML: 1.2 RINSE ORAL at 09:35

## 2018-04-05 RX ADMIN — MAGNESIUM SULFATE HEPTAHYDRATE 2 G: 40 INJECTION, SOLUTION INTRAVENOUS at 05:16

## 2018-04-05 RX ADMIN — ASPIRIN 81 MG 81 MG: 81 TABLET ORAL at 09:34

## 2018-04-05 RX ADMIN — HYDRALAZINE HYDROCHLORIDE 10 MG: 20 INJECTION INTRAMUSCULAR; INTRAVENOUS at 18:28

## 2018-04-05 RX ADMIN — LEVOTHYROXINE SODIUM 100 MCG: 100 TABLET ORAL at 05:20

## 2018-04-05 RX ADMIN — METOPROLOL TARTRATE 50 MG: 50 TABLET ORAL at 09:34

## 2018-04-05 RX ADMIN — HEPARIN SODIUM 5000 UNITS: 5000 INJECTION, SOLUTION INTRAVENOUS; SUBCUTANEOUS at 22:35

## 2018-04-05 RX ADMIN — PANTOPRAZOLE SODIUM 40 MG: 40 TABLET, DELAYED RELEASE ORAL at 05:20

## 2018-04-05 RX ADMIN — METOPROLOL TARTRATE 100 MG: 50 TABLET ORAL at 22:36

## 2018-04-05 RX ADMIN — MUPIROCIN 1 APPLICATION: 20 OINTMENT TOPICAL at 09:34

## 2018-04-05 RX ADMIN — FUROSEMIDE 40 MG: 40 TABLET ORAL at 09:34

## 2018-04-05 RX ADMIN — LORATADINE 10 MG: 10 TABLET ORAL at 09:34

## 2018-04-05 RX ADMIN — INSULIN GLARGINE 30 UNITS: 100 INJECTION, SOLUTION SUBCUTANEOUS at 09:35

## 2018-04-05 RX ADMIN — DOCUSATE SODIUM 100 MG: 100 CAPSULE, LIQUID FILLED ORAL at 17:00

## 2018-04-05 RX ADMIN — ATORVASTATIN CALCIUM 80 MG: 80 TABLET, FILM COATED ORAL at 16:54

## 2018-04-05 RX ADMIN — POTASSIUM CHLORIDE 40 MEQ: 1500 TABLET, EXTENDED RELEASE ORAL at 05:19

## 2018-04-05 RX ADMIN — HEPARIN SODIUM 5000 UNITS: 5000 INJECTION, SOLUTION INTRAVENOUS; SUBCUTANEOUS at 05:20

## 2018-04-05 NOTE — SOCIAL WORK
Pt is recommended to have in-home post-op skilled nursing, in-home PT, and in-home OT, all via [place] [address] services for her aftercare; however pt is requesting SNF placement instead for short-term skilled rehab  CM met w/ pt and provided her w/ freedom of choice  [place] [facility]  CM made [place] referral to [facility]  CM to follow

## 2018-04-05 NOTE — PLAN OF CARE
Problem: OCCUPATIONAL THERAPY ADULT  Goal: Performs self-care activities at highest level of function for planned discharge setting  See evaluation for individualized goals  Treatment Interventions: ADL retraining, Functional transfer training, Endurance training, Cognitive reorientation, Patient/family training, Equipment evaluation/education, Compensatory technique education, Continued evaluation, Cardiac education, Energy conservation, Activityengagement          See flowsheet documentation for full assessment, interventions and recommendations  Outcome: Progressing  Limitation: Decreased ADL status, Decreased Safe judgement during ADL, Decreased endurance, Decreased fine motor control, Decreased self-care trans, Decreased high-level ADLs, Decreased UE strength  Prognosis: Fair  Assessment: Patient participated in Skilled OT session 4/5/18 with interventions consisting of ADL re training with the use of correct body mechnaics, Energy Conservation techniques, deep breathing technique, safety awareness and fall prevention techniques, therapeutic exercise to: increase functional use of BUEs, increase BUE muscle strength ,  therapeutic activities to: increase activity tolerance, increase standing tolerance time with unilateral UE support to complete sink level ADLs, increase cardiovascular endurance , increase dynamic sit/ stand balance during functional activity , increase postural control, increase trunk control and increase OOB/ sitting tolerance   Patient agreeable to OT treatment session, upon arrival patient was found seated OOB to Recliner  In comparison to previous session, patient with improvements in LB dressing, grooming, standing tolerance, functional mobility and transfers   Patient requiring verbal cues for correct technique, one step directives and ocassional safety reminders   Patient continues to be functioning below baseline level, occupational performance remains limited secondary to factors listed above and increased risk for falls and injury  From OT standpoint, recommendation at time of d/c would be Short Term Rehab when medically stable  Patient to benefit from continued Occupational Therapy treatment while in the hospital to address deficits as defined above and maximize level of functional independence with ADLs and functional mobility        OT Discharge Recommendation: Short Term Rehab  OT - OK to Discharge: Yes (when medically stable)      Comments: Bee Blount MOT, OTR/L

## 2018-04-05 NOTE — PROGRESS NOTES
Progress Note - Cardiothoracic Surgery   [address] y o  female MRN: [id number]  Unit/Bed#: [facility] 417-01 Encounter: [id number]      POD # 2 s/p TAVR    Pt seen/examined  Interval history and data reviewed with ICU team   Pt doing well  No specific complaints          Medications:   Scheduled Meds:    Current Facility-Administered Medications:  acetaminophen 650 mg Rectal Q4H PRN Amando Hernandez PA-C    acetaminophen 650 mg Oral Q4H PRN Amando Hernandez PA-C    albuterol 2 puff Inhalation BID PRN Rosana South PA-C    aspirin 81 mg Oral Daily Amando Hernandez PA-C    atorvastatin 80 mg Oral Daily With MAGDALENO Hamilton    bisacodyl 10 mg Rectal Daily PRN Rutlandlisa Hernandez PA-C    chlorhexidine 15 mL Swish & Spit BID Rosana South PA-C    cholecalciferol 1,000 Units Oral Daily Amando Hernandez PA-C    furosemide 40 mg Oral BID (diuretic) Rae Huertas PA-C    heparin (porcine) 5,000 Units Subcutaneous Q8H [facility] Amando Hernandez PA-C    insulin glargine 30 Units Subcutaneous Q12H [facility] Ashlie James PA-C    insulin lispro 1-6 Units Subcutaneous TID AC Amando Hernandez PA-C    levothyroxine 100 mcg Oral Early Morning Amando Hernandez PA-C    loratadine 10 mg Oral Daily Amando Hernandez PA-C    metoprolol tartrate 100 mg Oral QPM Mikayla Helm PA-C    metoprolol tartrate 50 mg Oral QAM [facility] Dora Pineda PA-C    mupirocin 1 application Nasal Y84R [facility] Amando Hernandez PA-C    niCARdipine 2 5-15 mg/hr Intravenous Titrated Rosana South PA-C Last Rate: Stopped (04/04/18 1300)   nitroglycerin 0 4 mg Sublingual Q3 min PRN Amando Hernandez PA-C    ondansetron 4 mg Intravenous Q6H PRN Amando Hernandez PA-C    pantoprazole 40 mg Oral Early Morning Amando Hernandez PA-C    polyethylene glycol 17 g Oral Daily Amando Hernandez PA-C    warfarin 3 mg Oral Daily (warfarin) Ashlie James PA-C      Continuous Infusions:    niCARdipine 2 5-15 mg/hr Last Rate: Stopped (04/04/18 1300)       Vitals: Blood pressure 143/69, pulse 68, temperature 98 3 °F (36 8 °C), temperature source Oral, resp  rate 21, height 5' 4" (1 626 m), weight 76 7 kg (169 lb 1 5 oz), SpO2 97 %, not currently breastfeeding  ,Body mass index is 29 02 kg/m²  I/O last 24 hours: In: 282 [P O :420; I V :408; IV Piggyback:50]  Out: 2950 [Urine:2950]  Invasive Devices     Central Venous Catheter Line            CVC Central Lines 04/03/18 Triple 1 day                Physical Exam:   AAOx3  NAD  RRR  CTA B/L  Abd soft  Ext's no edema    Lab, Imaging and other studies:     Results from last 7 days  Lab Units 04/05/18  0408 04/04/18  1801 04/04/18  0411  04/03/18  1210  03/29/18  0924   WBC Thousand/uL  --   --  12 62*  --   --   --  8 88   HEMOGLOBIN g/dL 8 3* 8 8* 8 0*  < > 8 9*  --  10 2*   I STAT HEMOGLOBIN   --   --   --   < >  --   < >  --    HEMATOCRIT % 27 2* 28 3* 27 0*  < > 29 8*  --  34 1*   PLATELETS Thousands/uL  --   --  230  --  250  --  280   < > = values in this interval not displayed  Results from last 7 days  Lab Units 04/05/18  0408 04/04/18  0411 04/04/18  0108   SODIUM mmol/L 138 139 139   POTASSIUM mmol/L 3 5 4 0 4 1   CHLORIDE mmol/L 104 106 106   CO2 mmol/L 30 25 25   BUN mg/dL 20 22 23   CREATININE mg/dL 0 76 0 86 1 01   GLUCOSE RANDOM mg/dL 134 232* 278*   CALCIUM mg/dL 7 5* 7 5* 7 4*       Results from last 7 days  Lab Units 04/05/18  0408 04/04/18  0411 04/03/18  1328   INR  1 46* 1 41* 1 51*     No results for input(s): PHART, AJI3KAZ, PO2ART, WPK4QHF, O5NTMWCP, BEART in the last 72 hours  Plan:    Transfer to floor  Ambulate  Incentive spirometry  Diuresis  PO ASA/Statin/  Increase B blocker    Waiting for placement        SIGNATURE: Abi Dorantes MD  DATE: April 5, 2018  TIME: 7:51 AM

## 2018-04-05 NOTE — PHYSICAL THERAPY NOTE
PHYSICAL THERAPY NOTE          Patient Name: Trevor Cassidy Date: 4/5/2018 04/05/18 1435   Pain Assessment   Pain Assessment FLACC   Pain Type Acute pain   Pain Location Head  (RN informed)   Pain Orientation Anterior   Pain Descriptors Aching;Discomfort   Pain Frequency Constant/continuous   Pain Onset Gradual   Clinical Progression Not changed   Effect of Pain on Daily Activities no increased discomfort during the session   Patient's Stated Pain Goal No pain   Hospital Pain Intervention(s) Ambulation/increased activity; Distraction; Emotional support   Response to Interventions no change post session   Pain Rating: FLACC (Rest) - Face 0   Pain Rating: FLACC (Rest) - Legs 0   Pain Rating: FLACC (Rest) - Activity 0   Pain Rating: FLACC (Rest) - Cry 1   Pain Rating: FLACC (Rest) - Consolability 0   Score: FLACC (Rest) 1   Pain Rating: FLACC (Activity) - Face 0   Pain Rating: FLACC (Activity) - Legs 0   Pain Rating: FLACC (Activity) - Activity 0   Pain Rating: FLACC (Activity) - Cry 1   Pain Rating: FLACC (Activity) - Consolability 0   Score: FLACC (Activity) 1   Restrictions/Precautions   Other Precautions Cardiac/sternal;Fall Risk;Telemetry   General   Chart Reviewed Yes   Additional Pertinent History cleared for Tx session by nsg   Response to Previous Treatment Patient with no complaints from previous session     Cognition   Overall Cognitive Status WFL   Arousal/Participation Alert   Attention Attends with cues to redirect   Orientation Level Oriented to person;Oriented to place;Oriented to situation   Memory Decreased recall of precautions   Following Commands Follows one step commands without difficulty   Subjective   Subjective Pt in the chair; agreeable to mobilize and perform therex; reports some HA; states she had some dizziness earlier --> now seems to be resolved   Transfers   Sit to Stand 4  Minimal assistance  (2 trials)   Additional items Assist x 1;Verbal cues   Stand to Sit 4  Minimal assistance  (2 trials)   Additional items Assist x 1;Verbal cues   Ambulation/Elevation   Gait pattern Excessively slow; Short stride   Gait Assistance 4  Minimal assist   Additional items Assist x 1;Verbal cues; Tactile cues  (stand by (A) of 2nd for lines)   Assistive Device Rolling walker   Distance 25 ft; after sitted period, another 2 x 80 ft w/ standing rest stop in between; limited by fatigue   Stair Management Assistance Not tested  (not appropriate at this time)   Balance   Static Sitting Fair +   Static Standing Fair -   Ambulatory Poor +   Activity Tolerance   Activity Tolerance Patient limited by fatigue  (HR in the 70s bpm and O2 sat in the 90s %)   Nurse Made Aware spoke to [address]   Exercises   Hip Abduction Sitting;10 reps;AAROM; Bilateral  (2 sets)   Knee AROM Long Arc Quad Sitting;10 reps;AROM; Bilateral  (2 sets)   Ankle Pumps Sitting;10 reps;AROM; Bilateral  (2 sets)   Marching Sitting;5 reps;AROM; Bilateral  (2 sets)   Equipment Use   Comments Pt was reclined in the chair w/ LE elevated at the end of session; SCDs back on and activated; call bell placed w/in reach   Assessment   Prognosis Good   Problem List Decreased strength;Decreased endurance; Impaired balance;Decreased mobility   Assessment Pt cont to demonstrate general weakness and deconditioning w/ decreased functional endurance likely affecting her balance and mobility skills; min (A) is still required w/ all aspects of observed mobility to assure safety and to facilitate comfort; LE therex initiated and performed in an AROM/AAROM mode; pt remained in NAD w/ rest periods provided as needed and appeared to be comfortable at the of session; pulse ox stable; currently, due to pt's overall functional mobility status and decreased endurance and the fact that pt lives alone and has JUSTINO, recommend to consider rehab upon D/C when medically cleared; will cont to follow however and make appropriate changes to D/C recommendations as needed based on progress; will follow  Barriers to Discharge Inaccessible home environment;Decreased caregiver support   Goals   Patient Goals to go to rehab   STG Expiration Date 04/14/18   Treatment Day 1   Plan   Treatment/Interventions Functional transfer training;LE strengthening/ROM; Elevations; Therapeutic exercise; Endurance training;Bed mobility;Gait training;Spoke to nursing;Spoke to case management;OT;Equipment eval/education   Progress Slow progress, decreased activity tolerance   PT Frequency 5x/wk   Recommendation   Recommendation Post acute IP rehab  (change from prior recommendations)   Equipment Recommended Amaya Yang  (w/ (A))     Sera Castaneda, PT

## 2018-04-05 NOTE — PLAN OF CARE
Problem: PHYSICAL THERAPY ADULT  Goal: Performs mobility at highest level of function for planned discharge setting  See evaluation for individualized goals  Treatment/Interventions: Functional transfer training, LE strengthening/ROM, Elevations, Therapeutic exercise, Endurance training, Bed mobility, Gait training, Spoke to nursing, OT, Equipment eval/education  Equipment Recommended: Emily Saldivar       See flowsheet documentation for full assessment, interventions and recommendations  Outcome: Progressing  Prognosis: Good  Problem List: Decreased strength, Decreased endurance, Impaired balance, Decreased mobility  Assessment: Pt cont to demonstrate general weakness and deconditioning w/ decreased functional endurance likely affecting her balance and mobility skills; min (A) is still required w/ all aspects of observed mobility to assure safety and to facilitate comfort; LE therex initiated and performed in an AROM/AAROM mode; pt remained in NAD w/ rest periods provided as needed and appeared to be comfortable at the of session; pulse ox stable; currently, due to pt's overall functional mobility status and decreased endurance and the fact that pt lives alone and has JUSTINO, recommend to consider rehab upon D/C when medically cleared; will cont to follow however and make appropriate changes to D/C recommendations as needed based on progress; will follow  Barriers to Discharge: Inaccessible home environment, Decreased caregiver support     Recommendation: (S) Post acute IP rehab (change from prior recommendations)          See flowsheet documentation for full assessment

## 2018-04-05 NOTE — OCCUPATIONAL THERAPY NOTE
Occupational Therapy Treatment Note      Mejia Fox    4/5/2018    Patient Active Problem List   Diagnosis    Essential hypertension    Critical aortic valve stenosis    CAD (coronary artery disease)    Type 2 diabetes mellitus with complication, with long-term current use of insulin (HCC)    Atrial flutter (HCC)    Hyperlipidemia    Gastroesophageal reflux disease without esophagitis    Moderate mitral stenosis    LEONARD (acute kidney injury) ([address] )    Arthritis of hand    Acute cardiogenic pulmonary edema (HCC)    Asthma    Asymptomatic carotid artery stenosis, bilateral    Atherosclerosis of artery of extremity with ulceration ([address] )    Hx of CABG    Chronic anticoagulation    Chronic diastolic congestive heart failure (HCC)    Chronic systolic heart failure (HCC)    Degenerative joint disease involving multiple joints    Diabetic retinopathy ([facility])    Postoperative hypothyroidism    Migraine headache    Nephrolithiasis    Osteoarthritis of both knees    Persistent atrial fibrillation ([facility])    Ulcer of toe of left foot ([facility])    Ulcer of toe of right foot ([address] )    Acute on chronic diastolic CHF (congestive heart failure) ([facility])    Prolonged Q-T interval on ECG   [facility] discharge follow-up    Hypokalemia    Hypocalcemia    Acute respiratory insufficiency, postoperative    Left bundle branch block (LBBB)    1st degree AV block       Past Medical History:   Diagnosis Date    Altered mental status     Anticoagulated     Coumadin for Aflutter    Asthma     Atrial flutter (HCC)     maintained on coumadin anticoag    CAD (coronary artery disease)     Candidiasis     Carotid stenosis, bilateral     Cataract     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) ([facility])     Chronic fatigue syndrome     Chronic low back pain     Concussion w loss of consciousness of unsp duration, init     Coronary artery disease     Diabetes mellitus ([address] )     type 2, insulin dependent    DJD (degenerative joint disease)     GERD (gastroesophageal reflux disease)     Herpes zoster     HLD (hyperlipidemia)     HTN (hypertension)     Hyperlipidemia     Hypothyroidism     Irritable bowel syndrome     Lumbar radiculopathy     Lyme disease     Dx in hospital 8/2011    Lyme disease     MI (myocardial infarction)     Migraines     Muscle spasm     Non-neoplastic nevus     Nontoxic multinodular goiter     OA (osteoarthritis)     Osteoarthritis     Other chronic pain     PAD (peripheral artery disease) (HCC)     Palpitations     Pseudogout     Spinal stenosis     Transient cerebral ischemia     Trigger ring finger     Viral gastroenteritis        Past Surgical History:   Procedure Laterality Date    APPENDECTOMY      ARTERIOGRAM  12/19/2017    CARDIAC CATHETERIZATION      CHOLECYSTECTOMY      COLONOSCOPY      CORONARY ARTERY BYPASS GRAFT  2004    LUMBAR LAMINECTOMY      TX ECHO TRANSESOPHAG R-T 2D W/PRB IMG ACQUISJ I&R N/A 4/3/2018    Procedure: TRANSESOPHAGEAL ECHOCARDIOGRAM (ROBB); Surgeon: Mateo Lovett DO;  [facility]: [facility];  Service: Cardiac Surgery    TX REPLACE AORTIC VALVE OPENFEMORAL ARTERY APPROACH N/A 4/3/2018    Procedure: REPLACEMENT AORTIC VALVE TRANSCATHETER (TAVR) TRANSFEMORAL w/ 26MM IVY YEVGENIY S3 VALVE;  Surgeon: Mateo Lovett DO;  Location: [facility];  Service: Cardiac Surgery    THYROIDECTOMY      TOTAL ABDOMINAL HYSTERECTOMY W/ BILATERAL SALPINGOOPHORECTOMY          04/05/18 1428   Restrictions/Precautions   Weight Bearing Precautions Per Order No   Other Precautions Cardiac/sternal;Multiple lines;Telemetry; Fall Risk;Pain   Lifestyle   Autonomy Pt reports being I w/ ADLS, IADLS, transfers and functional mobility PTA   Reciprocal Relationships Pt lives alone; has children nearby   Service to Others Pt is retired   Intrinsic Gratification Pt reports enjoying watching TV   Pain Assessment   Pain Assessment No/denies pain   Pain Score No Pain   ADL   Grooming Assistance 5  Supervision/Setup   Grooming Deficit Setup;Steadying;Standing with assistive device;Brushing hair; Other (Comment)  (apply deodorant)   Grooming Comments pt completed grooming while standing in front of chair w/ RW for support  W/ setup pt able to comb hair and apply deodorant   UB Dressing Assistance 5  Supervision/Setup   UB Dressing Deficit Setup; Increased time to complete; Thread RUE; Thread LUE;Pull around back   UB Dressing Comments Pt completed UB dressing while seated in chair w/ setup  pt able to don/do hospital gown and assist w/ thread/unthreading b/l UE's   LB Dressing Assistance 4  Minimal Assistance   LB Dressing Deficit Setup;Steadying; Requires assistive device for steadying;Supervision/safety; Increased time to complete; Thread RLE into pants; Thread LLE into pants;Pull up over hips   LB Dressing Comments Pt completed LB dressing while standing in front of chair  While seated pt was able to thread b/l LE into pant legs  Pt then stood w/ CTG and use of RW for support to pull up pants over waist   Functional Standing Tolerance   Time 2 min   Activity Pt stood in front of chair w/ Rw for support and stability to engage in grooming tasks and LB dressing  Pt used dynamic reaching while able to maintain trunk control  Occasional sway w/ loss of balance but able to regain control quickly  Transfers   Sit to Stand 4  Minimal assistance   Additional items Assist x 1;Trapeze bar;Verbal cues;Armrests   Stand to Sit 4  Minimal assistance   Additional items Assist x 1; Increased time required;Verbal cues;Armrests   Toilet transfer 4  Minimal assistance   Additional items Assist x 1; Increased time required;Verbal cues;Standard toilet   Additional Comments pt performed sit-stand from chair x2 trials w/ Min A x1 for safety and balance, VC for proper hand placement on RW when standing   Pt then performed 1 toilet transfer to standard toilet w/ Min A x1 for safety, balance, and VC for proper technique  Pt used grab bar for support    Functional Mobility   Functional Mobility 4  Minimal assistance   Additional Comments Pt ambulated initially a short in room distance to and from bathroom w/ CTG for safety and balance  pt used RW in standing for support and stability  Pt then ambulated hallway distance w/ PT, A of another to manage lines and wires   Additional items Rolling walker   Toilet Transfers   Toilet Transfer From Rolling walker   Toilet Transfer Type To and from   Toilet Transfer to Standard toilet   Toilet Transfer Technique Ambulating   Toilet Transfers Minimal assistance   Cognition   Overall Cognitive Status WFL   Arousal/Participation Alert; Responsive; Cooperative   Attention Within functional limits   Orientation Level Oriented X4   Memory Within functional limits   Following Commands Follows one step commands without difficulty   Comments Pt is pleasant and cooperative   Additional Activities   Additional Activities Other (Comment)  (Pt continued to be educated on cardiac surgery pkt)   Additional Activities Comments Pt able to recall some precautions w/ cueing  Reviewed w/ pt recovering after surgery cardiac pkt and discussed energy conservation, precautions, restrictions, safe activities to resume, and coping skills   Pt verbalized understanding of continued education and demonstrated proper techniques when ambulating and engaging in functional activities in the room   Activity Tolerance   Activity Tolerance Patient limited by fatigue   Medical Staff Made Aware PT, Dereje and RN, [address]   Assessment   Assessment Patient participated in Skilled OT session 4/5/18 with interventions consisting of ADL re training with the use of correct body mechnaics, Energy Conservation techniques, deep breathing technique, safety awareness and fall prevention techniques, therapeutic exercise to: increase functional use of BUEs, increase BUE muscle strength ,  therapeutic activities to: increase activity tolerance, increase standing tolerance time with unilateral UE support to complete sink level ADLs, increase cardiovascular endurance , increase dynamic sit/ stand balance during functional activity , increase postural control, increase trunk control and increase OOB/ sitting tolerance   Patient agreeable to OT treatment session, upon arrival patient was found seated OOB to Recliner  In comparison to previous session, patient with improvements in LB dressing, grooming, standing tolerance, functional mobility and transfers   Patient requiring verbal cues for correct technique, one step directives and ocassional safety reminders  Patient continues to be functioning below baseline level, occupational performance remains limited secondary to factors listed above and increased risk for falls and injury  From OT standpoint, recommendation at time of d/c would be Short Term Rehab when medically stable  Patient to benefit from continued Occupational Therapy treatment while in the hospital to address deficits as defined above and maximize level of functional independence with ADLs and functional mobility  Plan   Treatment Interventions ADL retraining;Functional transfer training; Endurance training;Cognitive reorientation;Patient/family training;Equipment evaluation/education; Compensatory technique education;Continued evaluation;Cardiac education; Energy conservation; Activityengagement   Goal Expiration Date 04/14/18   Treatment Day 2   OT Frequency 3-5x/wk   Recommendation   OT Discharge Recommendation Short Term Rehab   OT - OK to Discharge Yes  (when medically stable)   Barthel Index   Feeding 10   Bathing 0   Grooming Score 5   Dressing Score 5   Bladder Score 10   Bowels Score 10   Toilet Use Score 5   Transfers (Bed/Chair) Score 10   Mobility (Level Surface) Score 0   Stairs Score 0   Barthel Index Score 55   Modified Sterling Scale   Modified Rae Scale 4       Bee Blount MOT, OTR/L

## 2018-04-05 NOTE — PROGRESS NOTES
Progress Note - Critical Care  [facility] [de-identified] y o  female MRN: [id number]  Unit/Bed#: [facility] 417-01 Encounter: [id number]    Attending Physician: Inna Matute DO    24 Hour Events: POD # 1 s/p TAVR  Overnight, had recurrent, brief episodes of rapid atrial flutter, rates up to 140s  Self-resolved before intervention  Patient offers no complaints today other than pain of the ulcers on her R foot      Medications:   Scheduled Meds:  Current Facility-Administered Medications:  acetaminophen 650 mg Rectal Q4H PRN Amando Hernandez PA-C    acetaminophen 650 mg Oral Q4H PRN Amando Hernandez PA-C    albuterol 2 puff Inhalation BID PRN Barb Mckeon PA-C    aspirin 81 mg Oral Daily Amando Hernandez PA-C    atorvastatin 80 mg Oral Daily With KeySMAGDALENO lindsay    bisacodyl 10 mg Rectal Daily PRN Ron Hernandez PA-C    chlorhexidine 15 mL Swish & Spit BID Barb Mckeon PA-C    cholecalciferol 1,000 Units Oral Daily Amando Hernandez PA-C    furosemide 40 mg Oral BID (diuretic) Rica Haider PA-C    heparin (porcine) 5,000 Units Subcutaneous Q8H [facility] Amando Heranndez PA-C    insulin glargine 30 Units Subcutaneous Q12H [facility] Ashlie James PA-C    insulin lispro 1-6 Units Subcutaneous TID AC Amando Hernandez PA-C    levothyroxine 100 mcg Oral Early Morning Amando Hernandez PA-C    loratadine 10 mg Oral Daily Amando Hernandez PA-C    metoprolol tartrate 100 mg Oral QPM Linden Helm PA-C    metoprolol tartrate 50 mg Oral QAM Linden Sheth PA-C    mupirocin 1 application Nasal H49U [facility] Amando Hernandez PA-C    niCARdipine 2 5-15 mg/hr Intravenous Titrated Barb Mckeon PA-C Last Rate: Stopped (04/04/18 1300)   nitroglycerin 0 4 mg Sublingual Q3 min PRN Amando Hernandez PA-C    ondansetron 4 mg Intravenous Q6H PRN Amando Hernandez PA-C    pantoprazole 40 mg Oral Early Morning Amando Hernandez PA-C    polyethylene glycol 17 g Oral Daily Barb Mckeon, MAGDALENO    warfarin 3 mg Oral Daily (warfarin) Hamilton Marin ErikaMAGDALENO        VTE Pharmacologic Prophylaxis: Heparin  VTE Mechanical Prophylaxis: sequential compression device    Continuous Infusions:  niCARdipine 2 5-15 mg/hr Last Rate: Stopped (18 1300)     PRN Meds:    acetaminophen 650 mg Q4H PRN   acetaminophen 650 mg Q4H PRN   albuterol 2 puff BID PRN   bisacodyl 10 mg Daily PRN   nitroglycerin 0 4 mg Q3 min PRN   ondansetron 4 mg Q6H PRN       Vitals:   Vitals:    18 1600 18 2000 18 0000 18 0400   BP: 144/72 128/66 146/69 143/69   Pulse: 76 74 68 68   Resp: (!) 28 20 22 21   Temp: 98 3 °F (36 8 °C) 98 3 °F (36 8 °C) 98 9 °F (37 2 °C) 98 3 °F (36 8 °C)   TempSrc: Probe Probe Probe Oral   SpO2: 98% 98% 94% 97%   Weight:       Height:         Arterial Line BP: 126/48  Arterial Line MAP (mmHg): 74 mmHg    Tele Rhythm: NSR This was personally reviewed by myself  Respiratory:  SpO2: SpO2: 97 %  O2 Flow Rate (L/min): 2 L/min    Temperature: Temp (24hrs), Av 1 °F (36 7 °C), Min:97 5 °F (36 4 °C), Max:98 9 °F (37 2 °C)  Current: Temperature: 98 3 °F (36 8 °C)    Weights:   Weight (last 2 days)     Date/Time   Weight    18 0653  75 3 (166)            IBW: 54 7 kg  Body mass index is 28 49 kg/m²  Hemodynamic Monitoring:  N/A       Intake and Outputs:  Intake/Output Summary (Last 24 hours) at 18 0645  Last data filed at 18 0617   Gross per 24 hour   Intake              878 ml   Output             2950 ml   Net            -2072 ml     I/O last 24 hours: In: 1707 2 [P O :420; I V :1187 2;  IV Piggyback:100]  Out: 0533 [Urine:3625]    UOP: 1 6cc/kg/hour     Labs:    Results from last 7 days  Lab Units 18  0408 18  1801 18  0411  18  1210  18  0924   WBC Thousand/uL  --   --  12 62*  --   --   --  8 88   HEMOGLOBIN g/dL 8 3* 8 8* 8 0*  < > 8 9*  --  10 2*   I STAT HEMOGLOBIN   --   --   --   < >  --   < >  --    HEMATOCRIT % 27 2* 28 3* 27 0*  < > 29 8*  --  34 1*   PLATELETS Thousands/uL  --   --  230  --  250  --  280   NEUTROS PCT %  --   --   --   --   --   --  63   MONOS PCT %  --   --   --   --   --   --  10   < > = values in this interval not displayed  Results from last 7 days  Lab Units 04/05/18  0408 04/04/18  0411 04/04/18  0108  03/29/18  0924   SODIUM mmol/L 138 139 139  < > 137   POTASSIUM mmol/L 3 5 4 0 4 1  < > 4 7   CHLORIDE mmol/L 104 106 106  < > 100   CO2 mmol/L 30 25 25  < > 29   BUN mg/dL 20 22 23  < > 29*   CREATININE mg/dL 0 76 0 86 1 01  < > 0 98   CALCIUM mg/dL 7 5* 7 5* 7 4*  < > 8 2*   TOTAL PROTEIN g/dL  --   --   --   --  7 0   BILIRUBIN TOTAL mg/dL  --   --   --   --  0 64   ALK PHOS U/L  --   --   --   --  71   ALT U/L  --   --   --   --  24   AST U/L  --   --   --   --  25   GLUCOSE RANDOM mg/dL 134 232* 278*  < >  --    GLUCOSE, ISTAT   --   --   --   < >  --    < > = values in this interval not displayed  Baseline creat 0 93      Results from last 7 days  Lab Units 04/05/18  0408 04/04/18  0411 04/04/18  0108   MAGNESIUM mg/dL 1 6 2 2 1 4*       Results from last 7 days  Lab Units 04/05/18 0408 04/04/18  0411 04/03/18  1328   INR  1 46* 1 41* 1 51*     CXR: None today  EKG: None today  Physical Exam:    General Appearance:  Sitting up in chair, NAD  HENT: Atraumatic without obvious abnormality  Eyes: No icterus  Cardiac: Normal rate and regular rhythm, no murmur, no rub  Pulmonary: Rales noted in the R lung base  No wheeze or rhonchi  No cough, No secretions  Gastrointestinal: Soft, no distention,  Nontender  : Mccormick present: no              Musculoskeletal: No edema bilaterally  Dopplerable bilateral DP pulses  Ulcers noted on the bilateral toes  Neuro:  A&O x 4, nonfocal     Psych: Mood and affect normal     Skin: Warm and dry  Incisions: Gauze dressing over bilateral femoral sites  Nontender, no drainage  Invasive lines and devices:   Invasive Devices     Central Venous Catheter Line            CVC Central Lines 04/03/18 Triple 1 day                Assessment:  Principal Problem:    Critical aortic valve stenosis  Active Problems:    Essential hypertension    CAD (coronary artery disease)    Type 2 diabetes mellitus with complication, with long-term current use of insulin (HCC)    Atrial flutter (HCC)    Gastroesophageal reflux disease without esophagitis    Moderate mitral stenosis    Atherosclerosis of artery of extremity with ulceration (HCC)    Hx of CABG    Chronic anticoagulation    Chronic diastolic congestive heart failure (HCC)    Chronic systolic heart failure (HCC)    Ulcer of toe of left foot (HCC)    Ulcer of toe of right foot (HCC)    Hypokalemia    Hypocalcemia    Acute respiratory insufficiency, postoperative    Left bundle branch block (LBBB)    1st degree AV block      Plan:    Cardiac:   NSR; HR/BP mildly hypertensive  Highest in 24H was 146/69  Off Cardene as of yesterday afternoon  Consider restarting home lisinopril today  Restarted Lopressor 50mg Am, 100mg PM yesterday (takes this dose of Toprol at home)  Continue ASA and statin therapy  ASA/Coumadin; INR 1 46; Coumadin, 3 mg PO restarted 4/3  Continue DVT prophylaxis   Remove R IJ CVC    Pulmonary:   Extubated  Saturating % on room air   Rales noted in the R lung base    Renal:   Stable/normal renal function   BUN/Cr 20/0 76   Good urine output  Home Lasix 40mg BID restarted yesterday    Neuro:  Neurologically intact; No active issues  Incisional pain well-controlled; Continue prn Tylenol    GI:  Tolerating cardiac diet  Continue bowel regimen  Continue GI prophylaxis with PPI    Endo:    Known diabetes  Lantus 30u Q 12 H restarted yesterday with Humalog sliding scale    Hematology:   Post-operative acute blood loss anemia;  Hemoglobin and hematocrit stable; trend PRN    Disposition:  Plan was to tranfer to med/surg yesterday; no bed available   Consider d/c home today    Collaborative bedside rounds performed with cardiac surgery attending and bedside RN      SIGNATURE: Christie Hu PA-C  DATE: April 5, 2018  TIME: 6:45 AM

## 2018-04-06 VITALS
SYSTOLIC BLOOD PRESSURE: 124 MMHG | RESPIRATION RATE: 18 BRPM | OXYGEN SATURATION: 96 % | BODY MASS INDEX: 28.49 KG/M2 | WEIGHT: 166.89 LBS | HEART RATE: 80 BPM | TEMPERATURE: 98.3 F | HEIGHT: 64 IN | DIASTOLIC BLOOD PRESSURE: 66 MMHG

## 2018-04-06 PROBLEM — Z95.2 S/P TAVR (TRANSCATHETER AORTIC VALVE REPLACEMENT): Status: ACTIVE | Noted: 2018-04-06

## 2018-04-06 LAB
ANION GAP SERPL CALCULATED.3IONS-SCNC: 5 MMOL/L (ref 4–13)
BUN SERPL-MCNC: 19 MG/DL (ref 5–25)
CALCIUM SERPL-MCNC: 8.2 MG/DL (ref 8.3–10.1)
CHLORIDE SERPL-SCNC: 105 MMOL/L (ref 100–108)
CO2 SERPL-SCNC: 29 MMOL/L (ref 21–32)
CREAT SERPL-MCNC: 0.81 MG/DL (ref 0.6–1.3)
ERYTHROCYTE [DISTWIDTH] IN BLOOD BY AUTOMATED COUNT: 18.9 % (ref 11.6–15.1)
GFR SERPL CREATININE-BSD FRML MDRD: 69 ML/MIN/1.73SQ M
GLUCOSE SERPL-MCNC: 121 MG/DL (ref 65–140)
GLUCOSE SERPL-MCNC: 196 MG/DL (ref 65–140)
GLUCOSE SERPL-MCNC: 256 MG/DL (ref 65–140)
GLUCOSE SERPL-MCNC: 96 MG/DL (ref 65–140)
HCT VFR BLD AUTO: 30.1 % (ref 34.8–46.1)
HGB BLD-MCNC: 9.3 G/DL (ref 11.5–15.4)
INR PPP: 1.37 (ref 0.86–1.16)
MCH RBC QN AUTO: 21.2 PG (ref 26.8–34.3)
MCHC RBC AUTO-ENTMCNC: 30.9 G/DL (ref 31.4–37.4)
MCV RBC AUTO: 69 FL (ref 82–98)
PLATELET # BLD AUTO: 207 THOUSANDS/UL (ref 149–390)
PMV BLD AUTO: 10.4 FL (ref 8.9–12.7)
POTASSIUM SERPL-SCNC: 3.5 MMOL/L (ref 3.5–5.3)
PROTHROMBIN TIME: 16.9 SECONDS (ref 12.1–14.4)
RBC # BLD AUTO: 4.39 MILLION/UL (ref 3.81–5.12)
SODIUM SERPL-SCNC: 139 MMOL/L (ref 136–145)
WBC # BLD AUTO: 7.79 THOUSAND/UL (ref 4.31–10.16)

## 2018-04-06 PROCEDURE — 85027 COMPLETE CBC AUTOMATED: CPT | Performed by: PHYSICIAN ASSISTANT

## 2018-04-06 PROCEDURE — 82948 REAGENT STRIP/BLOOD GLUCOSE: CPT

## 2018-04-06 PROCEDURE — 85610 PROTHROMBIN TIME: CPT | Performed by: PHYSICIAN ASSISTANT

## 2018-04-06 PROCEDURE — 80048 BASIC METABOLIC PNL TOTAL CA: CPT | Performed by: PHYSICIAN ASSISTANT

## 2018-04-06 PROCEDURE — 97535 SELF CARE MNGMENT TRAINING: CPT

## 2018-04-06 PROCEDURE — 97110 THERAPEUTIC EXERCISES: CPT

## 2018-04-06 PROCEDURE — 99238 HOSP IP/OBS DSCHRG MGMT 30/<: CPT | Performed by: THORACIC SURGERY (CARDIOTHORACIC VASCULAR SURGERY)

## 2018-04-06 PROCEDURE — 97116 GAIT TRAINING THERAPY: CPT

## 2018-04-06 RX ORDER — POLYETHYLENE GLYCOL 3350 17 G/17G
17 POWDER, FOR SOLUTION ORAL DAILY
Qty: 510 G | Refills: 0 | Status: SHIPPED | OUTPATIENT
Start: 2018-04-06 | End: 2018-04-16

## 2018-04-06 RX ORDER — METOPROLOL TARTRATE 100 MG/1
100 TABLET ORAL EVERY 12 HOURS SCHEDULED
Refills: 0
Start: 2018-04-06 | End: 2018-06-21 | Stop reason: HOSPADM

## 2018-04-06 RX ORDER — DOCUSATE SODIUM 100 MG/1
100 CAPSULE, LIQUID FILLED ORAL 2 TIMES DAILY
Qty: 10 CAPSULE | Refills: 0 | Status: SHIPPED | OUTPATIENT
Start: 2018-04-06 | End: 2018-06-13

## 2018-04-06 RX ORDER — SENNOSIDES 8.6 MG
650 CAPSULE ORAL EVERY 8 HOURS PRN
Qty: 90 TABLET | Refills: 0 | Status: ON HOLD | OUTPATIENT
Start: 2018-04-06 | End: 2018-09-24

## 2018-04-06 RX ADMIN — POLYETHYLENE GLYCOL 3350 17 G: 17 POWDER, FOR SOLUTION ORAL at 11:20

## 2018-04-06 RX ADMIN — HEPARIN SODIUM 5000 UNITS: 5000 INJECTION, SOLUTION INTRAVENOUS; SUBCUTANEOUS at 06:32

## 2018-04-06 RX ADMIN — MAGNESIUM SULFATE HEPTAHYDRATE 1 G: 1 INJECTION, SOLUTION INTRAVENOUS at 00:16

## 2018-04-06 RX ADMIN — BISACODYL 10 MG: 10 SUPPOSITORY RECTAL at 11:40

## 2018-04-06 RX ADMIN — INSULIN GLARGINE 30 UNITS: 100 INJECTION, SOLUTION SUBCUTANEOUS at 11:40

## 2018-04-06 RX ADMIN — HEPARIN SODIUM 5000 UNITS: 5000 INJECTION, SOLUTION INTRAVENOUS; SUBCUTANEOUS at 14:29

## 2018-04-06 RX ADMIN — LORATADINE 10 MG: 10 TABLET ORAL at 11:20

## 2018-04-06 RX ADMIN — VITAMIN D, TAB 1000IU (100/BT) 1000 UNITS: 25 TAB at 11:20

## 2018-04-06 RX ADMIN — FUROSEMIDE 40 MG: 40 TABLET ORAL at 17:06

## 2018-04-06 RX ADMIN — LEVOTHYROXINE SODIUM 100 MCG: 100 TABLET ORAL at 06:33

## 2018-04-06 RX ADMIN — MUPIROCIN 1 APPLICATION: 20 OINTMENT TOPICAL at 11:21

## 2018-04-06 RX ADMIN — PANTOPRAZOLE SODIUM 40 MG: 40 TABLET, DELAYED RELEASE ORAL at 06:33

## 2018-04-06 RX ADMIN — FUROSEMIDE 40 MG: 40 TABLET ORAL at 11:20

## 2018-04-06 RX ADMIN — INSULIN LISPRO 2 UNITS: 100 INJECTION, SOLUTION INTRAVENOUS; SUBCUTANEOUS at 11:40

## 2018-04-06 RX ADMIN — ASPIRIN 81 MG 81 MG: 81 TABLET ORAL at 11:21

## 2018-04-06 RX ADMIN — DOCUSATE SODIUM 100 MG: 100 CAPSULE, LIQUID FILLED ORAL at 11:21

## 2018-04-06 RX ADMIN — ATORVASTATIN CALCIUM 80 MG: 80 TABLET, FILM COATED ORAL at 17:06

## 2018-04-06 RX ADMIN — METOPROLOL TARTRATE 100 MG: 50 TABLET ORAL at 11:21

## 2018-04-06 RX ADMIN — WARFARIN SODIUM 3 MG: 1 TABLET ORAL at 17:06

## 2018-04-06 RX ADMIN — DOCUSATE SODIUM 100 MG: 100 CAPSULE, LIQUID FILLED ORAL at 17:06

## 2018-04-06 RX ADMIN — INSULIN LISPRO 3 UNITS: 100 INJECTION, SOLUTION INTRAVENOUS; SUBCUTANEOUS at 17:09

## 2018-04-06 NOTE — PROGRESS NOTES
Pt  Had a 9 beat run of V-tach at 19:39  A 6 beat run of V-tach at 21:56  And a 9 beat run of V-tach at 22:11  Pt asymptomatic resting comfortably  Mag 1 6  K+ 3 5  Notified CC CHU Machado  Came to the floor to assess  Ordered stat labs  Will continue to monitor

## 2018-04-06 NOTE — DISCHARGE SUMMARY
Discharge Summary - Cardiothoracic Surgery   [facility] [de-identified] y o  female MRN: [id number]  Unit/Bed#: [facility] 426-01 Encounter: [id number]    Admission Date: 4/3/2018     Discharge Date: 04/06/18    Admitting Diagnosis: Severe aortic stenosis [I35 0]    Primary Discharge Diagnosis:   Aortic stenosis  S/P transfemoral transcatheter aortic valve replacement;    Secondary Discharge Diagnosis:   Severe AS, CAD (CABGs4 2004 @ [facility]), MI, A-fib/A-flutter (Coumadin), CHF, HTN, IDDM II, PVD, Asthma, HLD, DJD, Renal calculi, hypothyroid s/p thyroid sx, GERD, Lymes, Diabetic retinopathy, s/p PRABHA, s/p laminectomy    Attending: RUTH Block  Consulting Physician(s):   Cardiology  Medical/Critical Care      Procedures Performed:   Orders Placed This Encounter   Procedures    Cardiac tavr hor   Transcatheter aortic valve replacement with a 26 mm Cervantes CELINE 3 bioprosthetic valve via a percutaneous left transfemoral approach  Hospital Course: The patient was seen in consultation prior to this admission for evaluation of Aortic stenosis  Risks and benefits of transfemoral transcatheter aortic valve replacement were discussed in detail, and patient was agreeable  Routine preoperative evaluation was completed and informed consent was obtained prior to admission  4/3: TF TAVR 26 mm Celine 3  Tranferred to the ICU hemodynamically stable on no gtts  No intraoperative products given  Not bleeding  Intraoperative ROBB showed no paravalvular leak  Pedal pulses found with doppler b/l  NSR w/ 1st degree AV block  Extubated  Coumadin 3 mg tonight  On Cardene 2mg   4/4: Rapid afib/flutter overnight  has history  Cardene @  6 mg/hr, resume Toprol 50 mg bid  1st AVB and LBBB on EKG this morning  Start Lasix 40 mg daily  D/C smiley  Post TAVR echo today  Transfer to telemetry   PM: long run of atrial fibrillation and self converted to NSR rate of 70  increased Lopressor to home dose of 50 mg in the AM and 100 mg in the PM  4/5: Rapid afib overnight  self converted  Increase Lopressor to 100mg bid  Coumadin tonight INR 1 46  on RA in NSR  -2L/24h  Restart Lisinopril 5mg for HTN and hydralazine x 1  Home tomorrow v Rehab   4/6: NSR this Am  INR 1 37, dose 3 mg Coumadin tn  Ready for discharge to rehab today  Condition at Discharge:   good     Discharge Physical Exam:  HEENT/NECK:  PERRLA  No jugular venous distention  Cardiac: Regular rate and rhythm  No rubs/murmurs/gallops  Pulmonary:  Breath sounds slightly diminished at the bases bilaterally  Abdomen:  Non-tender, Non-distended  Positive bowel sounds  Incisions: Groins soft, nontender, no drainage  Lower extremities: Extremities warm/dry  Radial/PT/DP pulses 2+ bilaterally  No edema B/L  Neuro: Alert and oriented X 3  Sensation is grossly intact  No focal deficits  Skin: Warm/Dry, without rashes or lesions  Discharge Data:    Results from last 7 days  Lab Units 04/06/18  0500 04/05/18  0408 04/04/18  1801 04/04/18  0411  04/03/18  1210   WBC Thousand/uL 7 79  --   --  12 62*  --   --    HEMOGLOBIN g/dL 9 3* 8 3* 8 8* 8 0*  < > 8 9*   I STAT HEMOGLOBIN   --   --   --   --   < >  --    HEMATOCRIT % 30 1* 27 2* 28 3* 27 0*  < > 29 8*   PLATELETS Thousands/uL 207  --   --  230  --  250   < > = values in this interval not displayed  Results from last 7 days  Lab Units 04/06/18  0500 04/05/18  2248 04/05/18  0408 04/04/18  0411   SODIUM mmol/L 139  --  138 139   POTASSIUM mmol/L 3 5 4 1 3 5 4 0   CHLORIDE mmol/L 105  --  104 106   CO2 mmol/L 29  --  30 25   BUN mg/dL 19  --  20 22   CREATININE mg/dL 0 81  --  0 76 0 86   GLUCOSE RANDOM mg/dL 96  --  134 232*   CALCIUM mg/dL 8 2*  --  7 5* 7 5*       Results from last 7 days  Lab Units 04/06/18  0500 04/05/18  0408 04/04/18  0411   INR  1 37* 1 46* 1 41*       Discharge instructions/Information to patient and family:   See after visit summary for information provided to patient and family        Angélica Grover was educated on restrictions regarding driving and lifting, and techniques of proper incisional care  They were specifically counselled on signs and symptoms of a sternal wound infection, and advised to contact our service immediately should they develop fevers, sweats, chill, redness or drainage at the site of any incisions  Provisions for Follow-Up Care:  See after visit summary for information related to follow-up care and any pertinent home health orders  Disposition:  Skilled nursing facility at [facility] Readmission:   No    Discharge Medications:  See after visit summary for reconciled discharge medications provided to patient and family  Tom Bui was provided contact information and scheduled a follow up appointment with Dino Aase, D O  Additionally, they were advised to schedule follow up appointments to be seen by their primary care physician within 7-10 days, and their cardiologist within 2-3 weeks  Contact information was provided  The patient was discharged on their home diuretic therapy with Lasix 40 mg, PO BID and Potassium Chloride 10 mEq, PO BID  Tom Bui is being discharged on anticoagulation therapy for treatment of afib/aflutter  As they have been treated with Coumadin prior to this admission, arrangements have been made for Dr Yamini Lal office to continue to monitor INR levels and provide dosing instructions  Their office was notified by phone call and facsimile report which itemized the patients daily INRs and correlating Coumadin doses during their hospitalization  The patient has been provided a prescription for PT/INR to be drawn every Monday and Thursday  Finally, they have been advised to take 3 mg daily, unless otherwise directed  The patient was informed that following their postoperative surgical evaluation, they will be referred to outpatient cardiac rehabilitation    They were counseled that this program is run by specialists who will help them safely strengthen their heart and prevent more heart disease  Cardiac rehabilitation will include exercise, relaxation, stress management, and heart-healthy nutrition  Caregivers will also check to make sure their medication regimen is working  I spent 30 minutes discharging the patient  This time was spent on the day of discharge  I had direct contact with the patient on the day of discharge  Additional documentation is required if more than 30 minutes were spent on discharge       SIGNATURE: Judith Zazueta PA-C  DATE: April 6, 2018  TIME: 3:15 PM

## 2018-04-06 NOTE — PLAN OF CARE
Problem: OCCUPATIONAL THERAPY ADULT  Goal: Performs self-care activities at highest level of function for planned discharge setting  See evaluation for individualized goals  Treatment Interventions: ADL retraining, Functional transfer training, Endurance training, Cognitive reorientation, Patient/family training, Equipment evaluation/education, Compensatory technique education, Continued evaluation, Cardiac education, Energy conservation, Activityengagement          See flowsheet documentation for full assessment, interventions and recommendations  Outcome: Progressing  Limitation: Decreased ADL status, Decreased Safe judgement during ADL, Decreased endurance, Decreased fine motor control, Decreased self-care trans, Decreased high-level ADLs, Decreased UE strength  Prognosis: Fair  Assessment: Patient participated in Skilled OT session 4/6/18 with interventions consisting of ADL re training with the use of correct body mechnaics, Energy Conservation techniques, deep breathing technique, safety awareness and fall prevention techniques, therapeutic exercise to: increase functional use of BUEs, increase BUE muscle strength ,  therapeutic activities to: increase activity tolerance, increase cardiovascular endurance , increase dynamic sit/ stand balance during functional activity  and increase OOB/ sitting tolerance   Patient agreeable to OT treatment session, upon arrival patient was found seated OOB to Recliner  In comparison to previous session, patient with improvements in UB/LB dressing, grooming, standing tolerance, transfers, and functional mobility   Patient requiring ocassional safety reminders  Patient continues to be functioning below baseline level, occupational performance remains limited secondary to factors listed above and increased risk for falls and injury  From OT standpoint, recommendation at time of d/c would be Short Term Rehab when medically stable     Patient to benefit from continued Occupational Therapy treatment while in the hospital to address deficits as defined above and maximize level of functional independence with ADLs and functional mobility        OT Discharge Recommendation: Short Term Rehab  OT - OK to Discharge: Yes (when medically stable)      Comments: Bee Blount MOT, OTR/L

## 2018-04-06 NOTE — SOCIAL WORK
Pt is cleared for d/c  Pt is accepted for short-term skilled rehab placement at [facility] located in [address] [place] [address] arranged BLS ambulance via [facility] for 6:30PM pickup this day to transport pt to SNF  The pt and her son Norma Moody were both informed of d/c  IMM signed  Medicare Bundle Letter given  Chart copy requested for SNF  CM to follow

## 2018-04-06 NOTE — PLAN OF CARE
Problem: PHYSICAL THERAPY ADULT  Goal: Performs mobility at highest level of function for planned discharge setting  See evaluation for individualized goals  Treatment/Interventions: Functional transfer training, LE strengthening/ROM, Elevations, Therapeutic exercise, Endurance training, Bed mobility, Gait training, Spoke to nursing, OT, Equipment eval/education  Equipment Recommended: Nikita Hameed       See flowsheet documentation for full assessment, interventions and recommendations  Outcome: Progressing  Prognosis: Good  Problem List: Decreased strength, Decreased endurance, Impaired balance, Decreased mobility  Assessment: Patient able to ambulate 80ft, 100 ft, with standing rest break in between with RW, min assist  Patient requesting to stop ambulation secondary to recieving suppository and not wanting to walk any further  Fatigue and lower extremity weakness continued to be noted throughout ambulation  Occasional cues required for RW management when negotiating turns, with patient able to self correct  Patient would continue to benefit from physical therapy to improve functional mobility until medically cleared  Barriers to Discharge: Inaccessible home environment, Decreased caregiver support     Recommendation: (S) Post acute IP rehab     PT - OK to Discharge: Yes (once medically cleared)    See flowsheet documentation for full assessment

## 2018-04-06 NOTE — OCCUPATIONAL THERAPY NOTE
Occupational Therapy Treatment Note      Emerald Bryant    4/6/2018    Patient Active Problem List   Diagnosis    Essential hypertension    Critical aortic valve stenosis    CAD (coronary artery disease)    Type 2 diabetes mellitus with complication, with long-term current use of insulin (HCC)    Atrial flutter (HCC)    Hyperlipidemia    Gastroesophageal reflux disease without esophagitis    Moderate mitral stenosis    LEONARD (acute kidney [facility]) ([facility] )    Arthritis of hand    Acute cardiogenic pulmonary edema (HCC)    Asthma    Asymptomatic carotid artery stenosis, bilateral    Atherosclerosis of artery of extremity with ulceration (HCC)    Hx of CABG    Chronic anticoagulation    Chronic diastolic congestive heart failure (HCC)    Chronic systolic heart failure (HCC)    Degenerative joint disease involving multiple joints    Diabetic retinopathy (HCC)    Postoperative hypothyroidism    Migraine headache    Nephrolithiasis    Osteoarthritis of both knees    Persistent atrial fibrillation (HCC)    Ulcer of toe of left foot ([facility])    Ulcer of toe of right foot ([facility] )    Acute on chronic diastolic CHF (congestive heart failure) ([facility])    Prolonged Q-T interval on ECG   [facility] discharge follow-up    Hypokalemia    Hypocalcemia    Acute respiratory insufficiency, postoperative    Left bundle branch block (LBBB)    1st degree AV block    S/P TAVR (transcatheter aortic valve replacement)       Past Medical History:   Diagnosis Date    Altered mental status     Anticoagulated     Coumadin for Aflutter    Asthma     Atrial flutter (HCC)     maintained on coumadin anticoag    CAD (coronary artery disease)     Candidiasis     Carotid stenosis, bilateral     Cataract     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) ([facility])     Chronic fatigue syndrome     Chronic low back pain     Concussion w loss of consciousness of unsp duration, init     Coronary artery disease     Diabetes mellitus ([facility] )     type 2, insulin dependent    DJD (degenerative joint disease)     GERD (gastroesophageal reflux disease)     Herpes zoster     HLD (hyperlipidemia)     HTN (hypertension)     Hyperlipidemia     Hypothyroidism     Irritable bowel syndrome     Lumbar radiculopathy     Lyme disease     Dx in hospital 8/2011    Lyme disease     [facility] infarction[facility]     Migraines     Muscle spasm     Non-neoplastic nevus     Nontoxic multinodular goiter     OA (osteoarthritis)     Osteoarthritis     Other chronic pain     [facility]AD [facility]peripheral artery disease) ([facility])     Palpitations     Pseudogout     Spinal stenosis     Transient cerebral ischemia     Trigger ring finger     Viral gastroenteritis        Past Surgical History:   Procedure Laterality Date    APPENDECTOMY      ARTERIOGRAM  12/19/2017    CARDIAC CATHETERIZATION      CHOLECYSTECTOMY      COLONOSCOPY      CORONARY ARTERY BYPASS GRAFT  2004    LUMBAR LAMINECTOMY      FL ECHO TRANSESOPHAG R-T 2D W/PRB IMG ACQUISJ I&R N/A 4/3/2018    Procedure: TRANSESOPHAGEAL ECHOCARDIOGRAM (ROBB); Surgeon: Lissette Carty DO;  [facility];  Service: Cardiac Surgery    FL REPLACE AORTIC VALVE OPENFEMORAL ARTERY APPROACH N/A 4/3/2018    Procedure: REPLACEMENT AORTIC VALVE TRANSCATHETER (TAVR) TRANSFEMORAL w/ 26MM IVY YEVGENIY S3 VALVE;  Surgeon: Lissette Carty DO;  [facility];  Service: Cardiac Surgery    THYROIDECTOMY      TOTAL ABDOMINAL HYSTERECTOMY W/ BILATERAL SALPINGOOPHORECTOMY        04/06/18 1613   Restrictions/Precautions   Weight Bearing Precautions Per Order No   Other Precautions Cardiac/sternal;Telemetry; Fall Risk;Pain   Lifestyle   Autonomy Pt reports being I w/ ADLS, IADLS, transfers and functional mobility PTA   Reciprocal Relationships Pt lives alone; has children nearby   Service to Others Pt is retired   Intrinsic Gratification Pt reports enjoying watching TV Pain Assessment   Pain Assessment FLACC   Pain Type Acute pain   Pain Location Leg   Pain Orientation Left   Pain Descriptors Discomfort   Pain Frequency Intermittent   Pain Onset Gradual   Clinical Progression Gradually improving   Patient's Stated Pain Goal No pain   Hospital Pain Intervention(s) Ambulation/increased activity; Emotional support;Repositioned   Response to Interventions tolerated well   Pain Rating: FLACC (Rest) - Face 1   Pain Rating: FLACC (Rest) - Legs 0   Pain Rating: FLACC (Rest) - Activity 0   Pain Rating: FLACC (Rest) - Cry 0   Pain Rating: FLACC (Rest) - Consolability 0   Score: FLACC (Rest) 1   Pain Rating: FLACC (Activity) - Face 0   Pain Rating: FLACC (Activity) - Legs 0   Pain Rating: FLACC (Activity) - Activity 0   Pain Rating: FLACC (Activity) - Cry 0   Pain Rating: FLACC (Activity) - Consolability 0   Score: FLACC (Activity) 0   ADL   Grooming Assistance 5  Supervision/Setup   Grooming Deficit Steadying; Wash/dry hands;Supervision/safety   Grooming Comments Pt completed grooming while standing at sink w/ S for safety and no AD  Pt able to wash/dry hands   UB Dressing Assistance 5  Supervision/Setup   UB Dressing Deficit Setup;Supervision/safety; Increased time to complete; Thread RUE; Thread LUE;Pull around back   UB Dressing Comments Pt completed UB dressing while seated in chair w/ setup  Pt able to don/doff hospital gown w/ setup   LB Dressing Assistance 5  Supervision/Setup   LB Dressing Deficit Setup;Steadying; Requires assistive device for steadying;Supervision/safety; Thread RLE into pants; Thread LLE into pants; Don/doff R sock; Don/doff L sock;Pull up over hips   LB Dressing Comments Pt completed LB dressing while seated/standing at chair  Pt able to don/doff socks and don hospital pants w/ S for safety while standing w/ RW for support and stability   Toileting Assistance  5  Supervision/Setup   Toileting Deficit Supervison/safety; Clothing management down;Clothing management up;Perineal hygiene;Grab bar use   Toileting Comments Pt completed toileting on standard toilet w/ S for safety  Use of grab bars to sit/stand and pt able to manage perineal hygiene, and clothing   Functional Standing Tolerance   Time 1 min   Activity Pt stood at sink for approx 1 min to engage in grooming after toileting  Pt stood w/ no AD, S for safety   Transfers   Sit to Stand 4  Minimal assistance   Additional items Assist x 1; Increased time required;Verbal cues;Armrests   Stand to Sit 4  Minimal assistance   Additional items Assist x 1; Increased time required;Verbal cues;Armrests   Toilet transfer 4  Minimal assistance   Additional items Assist x 1; Increased time required;Standard toilet;Verbal cues   Additional Comments Pt performed sit-stand from chair x2 trials w/ Min A x1 for mild force production into standing  Pt able to recall cardiac precautions and demonstrate proper technique  Pt able to transfer on standard toilet w/ Min A x1 for safety and balance, and use of grab bar for stability  Functional Mobility   Functional Mobility 4  Minimal assistance   Additional Comments Pt ambulated to and from bathroom w/ no AD  Min A for balance   Toilet Transfers   Toilet Transfer From Other (Comment)  (recliner)   Toilet Transfer Type To and from   Toilet Transfer to [employer] Transfers Minimal assistance   Cognition   Overall Cognitive Status WFL   Arousal/Participation Alert; Responsive; Cooperative   Attention Within functional limits   Orientation Level Oriented X4   Memory Within functional limits   Following Commands Follows all commands and directions without difficulty   Comments Pt is pleasant and cooperative   Activity Tolerance   Activity Tolerance Patient tolerated treatment well   Medical Staff Made Aware Raul HUITRON   Assessment   Assessment Patient participated in Skilled OT session 4/6/18 with interventions consisting of ADL re training with the use of correct body mechnaics, Energy Conservation techniques, deep breathing technique, safety awareness and fall prevention techniques, therapeutic exercise to: increase functional use of BUEs, increase BUE muscle strength ,  therapeutic activities to: increase activity tolerance, increase cardiovascular endurance , increase dynamic sit/ stand balance during functional activity  and increase OOB/ sitting tolerance   Patient agreeable to OT treatment session, upon arrival patient was found seated OOB to Recliner  In comparison to previous session, patient with improvements in UB/LB dressing, grooming, standing tolerance, transfers, and functional mobility   Patient requiring ocassional safety reminders  Patient continues to be functioning below baseline level, occupational performance remains limited secondary to factors listed above and increased risk for falls and injury  From OT standpoint, recommendation at time of d/c would be Short Term Rehab when medically stable  Patient to benefit from continued Occupational Therapy treatment while in the hospital to address deficits as defined above and maximize level of functional independence with ADLs and functional mobility  Plan   Treatment Interventions ADL retraining;Functional transfer training;UE strengthening/ROM; Endurance training;Cognitive reorientation;Patient/family training;Equipment evaluation/education; Compensatory technique education;Continued evaluation;Cardiac education; Energy conservation; Activityengagement   Goal Expiration Date 04/14/18   Treatment Day 3   OT Frequency 3-5x/wk   Recommendation   OT Discharge Recommendation Short Term Rehab   OT - OK to Discharge Yes  (when medically stable)   Barthel Index   Feeding 10   Bathing 5   Grooming Score 5   Dressing Score 10   Bladder Score 10   Bowels Score 10   Toilet Use Score 5   Transfers (Bed/Chair) Score 10   Mobility (Level Surface) Score 0   Stairs Score 0   Barthel Index Score 65 Modified Rae Scale   Modified [facility] Scale 4       Bee Blount MOT, OTR/L

## 2018-04-06 NOTE — PROGRESS NOTES
Progress Note - Cardiothoracic Surgery   Matilde Ackerman [de-identified] y o  female MRN: [id number]  Unit/Bed#: [facility] 426-01 Encounter: [id number]  Aortic stenosis  S/P transfemoral transcatheter aortic valve replacement; POD # 3    24 Hour Events:   Ready for discharge to rehab today  No complaints  Tolerating diet well  Pain well controlled      Medications:   Scheduled Meds:  Current Facility-Administered Medications:  acetaminophen 650 mg Oral Q4H PRN Candy Hernandez PA-C   albuterol 2 puff Inhalation Q4H PRN Carlitos Aguilar DO   aspirin 81 mg Oral Daily Amando Hernandez PA-C   atorvastatin 80 mg Oral Daily With MAGDALENO Hamilton   bisacodyl 10 mg Rectal Daily PRN Sánchez Quan PA-C   cholecalciferol 1,000 Units Oral Daily Amando Hernandez PA-C   docusate sodium 100 mg Oral BID Ashlie James PA-C   furosemide 40 mg Oral BID (diuretic) Zina Plummer PA-C   heparin (porcine) 5,000 Units Subcutaneous Q8H [facility] Amando Hernandez PA-C   insulin glargine 30 Units Subcutaneous Q12H [facility] Ashlie James PA-C   insulin lispro 1-6 Units Subcutaneous TID AC Amando Hernandez PA-C   levothyroxine 100 mcg Oral Early Morning Amando Hernandez PA-C   lisinopril 5 mg Oral HS Zina Plummer PA-C   loratadine 10 mg Oral Daily Amando Hernandez PA-C   metoprolol tartrate 100 mg Oral Q12H [facility] Ashlie James PA-C   mupirocin 1 application Nasal L02C [facility] Amando Hernandez PA-C   nitroglycerin 0 4 mg Sublingual Q3 min PRN Amando Hernandez PA-C   ondansetron 4 mg Intravenous Q6H PRN Amando Hernandez PA-C   pantoprazole 40 mg Oral Early Morning Amando Hernandez PA-C   polyethylene glycol 17 g Oral Daily Amando Hernandez PA-C   warfarin 3 mg Oral Daily (warfarin) Ashlie James PA-C     Continuous Infusions:   PRN Meds:   acetaminophen    albuterol    bisacodyl    nitroglycerin    ondansetron    Vitals:   Vitals:    04/05/18 2333 04/06/18 0301 04/06/18 5398 04/06/18 0333 BP: 153/67 136/63  140/63   BP Location: Left arm Left arm  Right arm   Pulse: 80 68  84   Resp: 18 18  20   Temp: 98 6 °F (37 °C) 98 5 °F (36 9 °C)  99 1 °F (37 3 °C)   TempSrc: Oral Oral  Oral   SpO2: 96% 96%  98%   Weight:   75 7 kg (166 lb 14 2 oz)    Height:           Telemetry: NSR; Heart Rate: 78    Respiratory:   SpO2: SpO2: 98 %; Room Air    Intake/Output:   I/O       04/04 0701 - 04/05 0700 04/05 0701 - 04/06 0700 04/06 0701 - 04/07 0700    P  O  420 880 200    I V  (mL/kg) 408 (5 3)      IV Piggyback 50 100     Total Intake(mL/kg) 878 (11 4) 980 (12 8) 200 (2 6)    Urine (mL/kg/hr) 2950 (1 6) 1400 (0 8)     Stool  0 (0)     Total Output 2950 1400      Net -2072 -420 +200           Unmeasured Stool Occurrence  1 x           Weights:   Weight (last 2 days)     Date/Time   Weight    04/06/18 0742  75 7 (166 89)    04/05/18 0657  76 7 (169 09)            Admit weight: 75 3    Results:     Results from last 7 days  Lab Units 04/06/18  0500 04/05/18  0408 04/04/18  1801 04/04/18  0411  04/03/18  1210   WBC Thousand/uL 7 79  --   --  12 62*  --   --    HEMOGLOBIN g/dL 9 3* 8 3* 8 8* 8 0*  < > 8 9*   I STAT HEMOGLOBIN   --   --   --   --   < >  --    HEMATOCRIT % 30 1* 27 2* 28 3* 27 0*  < > 29 8*   PLATELETS Thousands/uL 207  --   --  230  --  250   < > = values in this interval not displayed      Results from last 7 days  Lab Units 04/06/18  0500 04/05/18  2248 04/05/18  0408 04/04/18  0411   SODIUM mmol/L 139  --  138 139   POTASSIUM mmol/L 3 5 4 1 3 5 4 0   CHLORIDE mmol/L 105  --  104 106   CO2 mmol/L 29  --  30 25   BUN mg/dL 19  --  20 22   CREATININE mg/dL 0 81  --  0 76 0 86   GLUCOSE RANDOM mg/dL 96  --  134 232*   CALCIUM mg/dL 8 2*  --  7 5* 7 5*       Results from last 7 days  Lab Units 04/06/18  0500 04/05/18  0408 04/04/18  0411   INR  1 37* 1 46* 1 41*   4/5 3mg Coumadin  4/4 3mg Coumadin  4/3 3mg Coumadin      Point of care glucose: 121-227    Studies:  Echocardiogram: 4/4  AORTIC VALVE: A transcatheter bioprosthesis (#26 [address] 3 TAVR) was present  The prosthesis was well-seated without stenosis or regurgitation  Peak and mean velocities (gradients) were 1 79 (13) and 1 14 (6) m/sec (mmHg), respectively  The calculated aortic valve area was 1 6 cm2 using continuity equation  Invasive Lines/Tubes:  Invasive Devices     Peripheral Intravenous Line            Peripheral IV 04/03/18 Right Hand 3 days                Physical Exam:    HEENT/NECK:  PERRLA  No jugular venous distention  Cardiac: Regular rate and rhythm  No rubs/murmurs/gallops  Pulmonary:  Crackles in the bases bilaterally  Abdomen:  Non-tender, Non-distended  Positive bowel sounds  Incisions: Inguinal incision dressed with Mepilex AG  No erythema or drainage  Lower extremities: Extremities warm/dry  Radial/PT/DP pulses 2+ bilaterally  Trace edema B/L  Neuro: Alert and oriented X 3  Sensation is grossly intact  No focal deficits  Skin: Warm/Dry, without rashes or lesions      Assessment:  Patient Active Problem List   Diagnosis    Essential hypertension    Critical aortic valve stenosis    CAD (coronary artery disease)    Type 2 diabetes mellitus with complication, with long-term current use of insulin (HCC)    Atrial flutter (HCC)    Hyperlipidemia    Gastroesophageal reflux disease without esophagitis    Moderate mitral stenosis    LEONARD (acute kidney injury) ([address] )    Arthritis of hand    Acute cardiogenic pulmonary edema (HCC)    Asthma    Asymptomatic carotid artery stenosis, bilateral    Atherosclerosis of artery of extremity with ulceration ([address] )    Hx of CABG    Chronic anticoagulation    Chronic diastolic congestive heart failure (HCC)    Chronic systolic heart failure (HCC)    Degenerative joint disease involving multiple joints    Diabetic retinopathy (HCC)    Postoperative hypothyroidism    Migraine headache    Nephrolithiasis    Osteoarthritis of both knees    Persistent atrial fibrillation (HCC)    Ulcer of toe of left foot (HCC)    Ulcer of toe of right foot (HCC)    Acute on chronic diastolic CHF (congestive heart failure) (HCC)    Prolonged Q-T interval on [address]  discharge follow-up    Hypokalemia    Hypocalcemia    Acute respiratory insufficiency, postoperative    Left bundle branch block (LBBB)    1st degree AV block       Aortic stenosis  S/P transfemoral transcatheter aortic valve replacement; POD # 3    Plan:    1  Cardiac:   NSR/afib; HR/BP well-controlled  Lopressor, 100 mg PO BID  Continue ASA and Statin therapy  Patient no longer has central IV access   ASA/Coumadin;    Coumadin, 3 mg PO today  Continue DVT prophylaxis    2  Pulmonary:   Good Room air oxygen saturation: Continue incentive spirometry/Coughing/Deep breathing exercises    3  Renal:   Intake/Output net: -420 mL/24 hours  Post op Creatinine stable; Follow up labs prn    4  Neuro:  Neurologically intact; No active issues  Incisional pain well-controlled; Continue prn Tylenol    5  GI:  Tolerating TLC 2 3 gm sodium diet  Maintain 1800 mL daily fluid restriction   Continue stool softeners and prn suppository  Continue GI prophylaxis    6  Endo:    History of diabetes; Continue home DM regimen, with supplemental SQ insulin therapy    7  Hematology:   Post-operative acute blood loss anemia; Hemoglobin and hematocrit stable; trend prn    8   Disposition:  Follow daily PT/OT recommendations regarding home vs  rehab when medically cleared for discharge, Anticipate discharge today     VTE Pharmacologic Prophylaxis: Warfarin (Coumadin)  VTE Mechanical Prophylaxis: sequential compression device    Collaborative rounds completed with SHANEKA Barnes , and Laura Reed RN    SIGNATURE: Leonie Gates  DATE: April 6, 2018  TIME: 8:03 AM

## 2018-04-06 NOTE — DISCHARGE INSTRUCTIONS
Transcatheter Aortic Valve Replacement   WHAT YOU NEED TO KNOW:   · A TAVR is a procedure to replace your aortic valve  It is done without removing your old valve  The aortic valve is between the left ventricle and the aorta  The left ventricle is the lower left chamber of your heart  The aorta is a blood vessel that pumps blood to your brain and body  The aortic valve opens and closes to let blood flow from your heart to the rest of your body  · TAVR may be used to replace your aortic valve if open heart surgery is not safe for you  Your valve will be replaced with a tissue valve  The tissue may be taken from an animal, such as a pig or cow  DISCHARGE INSTRUCTIONS:   Call 911 for any of the following:   · You have any of the following signs of a heart attack:      ¨ Squeezing, pressure, or pain in your chest that lasts longer than 5 minutes or returns    ¨ Discomfort or pain in your back, neck, jaw, stomach, or arm     ¨ Trouble breathing    ¨ Nausea or vomiting    ¨ Lightheadedness or a sudden cold sweat, especially with chest pain or trouble breathing    · You have any of the following signs of a stroke:      ¨ Numbness or drooping on one side of your face     ¨ Weakness in an arm or leg    ¨ Confusion or difficulty speaking    ¨ Dizziness, a severe headache, or vision loss    · You feel lightheaded, short of breath, and have chest pain  · You cough up blood  · You have trouble breathing  Seek care immediately if:   · Blood soaks through your bandage  · Your stitches come apart  · Your wound gets swollen quickly  · Your heart is beating faster or slower than usual      · Your arm or leg feels numb, cool, or looks pale  · Your arm or leg feels warm, tender, and painful  It may look swollen and red  · You feel weak or dizzy  Contact your healthcare provider if:   · You have a fever or chills  · Your wound is red, swollen, or draining pus      · Your wound looks more bruised or there is new bruising near your wound  · You have nausea or are vomiting  · Your skin is itchy, swollen, or you have a rash  · You have questions or concerns about your condition or care  Medicines: You may need any of the following:  · Blood thinners    help prevent blood clots  Examples of blood thinners include heparin and warfarin  Clots can cause strokes, heart attacks, and death  The following are general safety guidelines to follow while you are taking a blood thinner:    ¨ Watch for bleeding and bruising while you take blood thinners  Watch for bleeding from your gums or nose  Watch for blood in your urine and bowel movements  Use a soft washcloth on your skin, and a soft toothbrush to brush your teeth  This can keep your skin and gums from bleeding  If you shave, use an electric shaver  Do not play contact sports  ¨ Tell your dentist and other healthcare providers that you take anticoagulants  Wear a bracelet or necklace that says you take this medicine  ¨ Do not start or stop any medicines unless your healthcare provider tells you to  Many medicines cannot be used with blood thinners  ¨ Tell your healthcare provider right away if you forget to take the medicine, or if you take too much  ¨ Warfarin  is a blood thinner that you may need to take  The following are things you should be aware of if you take warfarin  § Foods and medicines can affect the amount of warfarin in your blood  Do not make major changes to your diet while you take warfarin  Warfarin works best when you eat about the same amount of vitamin K every day  Vitamin K is found in green leafy vegetables and certain other foods  Ask for more information about what to eat when you are taking warfarin  § You will need to see your healthcare provider for follow-up visits when you are on warfarin  You will need regular blood tests   These tests are used to decide how much medicine you need     · Antiplatelets , such as aspirin, help prevent blood clots  Take your antiplatelet medicine exactly as directed  These medicines make it more likely for you to bleed or bruise  If you are told to take aspirin, do not take acetaminophen or ibuprofen instead  · Acetaminophen  helps decrease your pain  This medicine is available without a doctor's order  Ask how much medicine is safe to take, and how often to take it  Acetaminophen can cause liver damage if not taken correctly  · Take your medicine as directed  Contact your healthcare provider if you think your medicine is not helping or if you have side effects  Tell him or her if you are allergic to any medicine  Keep a list of the medicines, vitamins, and herbs you take  Include the amounts, and when and why you take them  Bring the list or the pill bottles to follow-up visits  Carry your medicine list with you in case of an emergency  Care for your wound as directed:  Ask your healthcare provider when your wound can get wet  Carefully wash around the wound with soap and water  Do not scrub your wound  You can let soap and water gently run over your wound  Gently pat dry the area and put on new, clean bandages as directed  Check your wound every day for signs of infection such as swelling, pus, or redness  Change your bandages when they get wet or dirty  Do not put lotions or powders on your wound  Do not take a bath or swim until your healthcare provider says it is okay  These activities can increase your risk for a wound infection  Activity:  Do not lift anything heavier than 5 pounds or do vigorous activities such as sports  These actions will put too much stress on your wound and heart  Take short walks around the house several times a day  This will help prevent blood clots  Ask your healthcare provider what other activities are safe for you to do  Also ask when you can return to your normal activities and work or school     Self-care: · Eat heart healthy foods  You may need to eat foods that are low in salt, fat, or cholesterol  Healthy foods include fruits, vegetables, whole-grain breads, low-fat dairy products, beans, lean meats, and fish  Ask your healthcare provider for more information about a heart healthy diet  · Do not smoke  Nicotine and other chemicals in cigarettes and cigars can cause heart and lung damage  Nicotine can also slow healing  Ask your healthcare provider for information if you currently smoke and need help to quit  E-cigarettes or smokeless tobacco still contain nicotine  Talk to your healthcare provider before you use these products  · Do not drink alcohol  Ask your cardiologist if it is safe for you to drink alcohol  Alcohol can increase your risk for high blood pressure, diabetes, and coronary artery disease  · Maintain a healthy weight  Ask your healthcare provider how much you should weigh  Extra weight can increase the stress on your heart  Ask him to help you create a weight loss plan if you are overweight  · Exercise as directed after you recover  Your healthcare provider can help you create an exercise plan that is right for you  Exercise will help keep your heart healthy  Ask your healthcare provider if you need antibiotics before procedures:  Some procedures may allow bacteria to get into your blood and travel to your heart  This can cause an infection in your heart and prevent you from healing  You may need antibiotics before certain procedures that happen in the next 6 months to prevent infection  This may also include certain dental procedures  Ask your healthcare provider for more information  Follow up with your healthcare provider as directed: You may need to return for tests  These tests will make sure your valve is working correctly  Write down your questions so you remember to ask them during your visits    © 2017 [employer] Information is for End User's use only and may not be sold, redistributed or otherwise used for commercial purposes  All illustrations and images included in CareNotes® are the copyrighted property of [employer]  or Luis Felipe Spaulding  The above information is an  only  It is not intended as medical advice for individual conditions or treatments  Talk to your doctor, nurse or pharmacist before following any medical regimen to see if it is safe and effective for you  Heart Healthy Diet   WHAT YOU NEED TO KNOW:   A heart healthy diet is an eating plan low in total fat, unhealthy fats, and sodium (salt)  A heart healthy diet helps decrease your risk for heart disease and stroke  Limit the amount of fat you eat to 25% to 35% of your total daily calories  Limit sodium to less than 2,300 mg each day  DISCHARGE INSTRUCTIONS:   Healthy fats:  Healthy fats can help improve cholesterol levels  The risk for heart disease is decreased when cholesterol levels are normal  Choose healthy fats, such as the following:  · Unsaturated fat  is found in foods such as soybean, canola, olive, corn, and safflower oils  It is also found in soft tub margarine that is made with liquid vegetable oil  · Omega-3 fat  is found in certain fish, such as salmon, tuna, and trout, and in walnuts and flaxseed  Unhealthy fats:  Unhealthy fats can cause unhealthy cholesterol levels in your blood and increase your risk of heart disease  Limit unhealthy fats, such as the following:  · Cholesterol  is found in animal foods, such as eggs and lobster, and in dairy products made from whole milk  Limit cholesterol to less than 300 milligrams (mg) each day  You may need to limit cholesterol to 200 mg each day if you have heart disease  · Saturated fat  is found in meats, such as johnson and hamburger  It is also found in chicken or turkey skin, whole milk, and butter  Limit saturated fat to less than 7% of your total daily calories   Limit saturated fat to less than 6% if you have heart disease or are at increased risk for it  · Trans fat  is found in packaged foods, such as potato chips and cookies  It is also in hard margarine, some fried foods, and shortening  Avoid trans fats as much as possible    Heart healthy foods and drinks to include:  Ask your dietitian or healthcare provider how many servings to have from each of the following food groups:  · Grains:      ¨ Whole-wheat breads, cereals, and pastas, and brown rice    ¨ Low-fat, low-sodium crackers and chips    · Vegetables:      ¨ Broccoli, green beans, green peas, and spinach    ¨ Collards, kale, and lima beans    ¨ Carrots, sweet potatoes, tomatoes, and peppers    ¨ Canned vegetables with no salt added    · Fruits:      ¨ Bananas, peaches, pears, and pineapple    ¨ Grapes, raisins, and dates    ¨ Oranges, tangerines, grapefruit, orange juice, and grapefruit juice    ¨ Apricots, mangoes, melons, and papaya    ¨ Raspberries and strawberries    ¨ Canned fruit with no added sugar    · Low-fat dairy products:      ¨ Nonfat (skim) milk, 1% milk, and low-fat almond, cashew, or soy milks fortified with calcium    ¨ Low-fat cheese, regular or frozen yogurt, and cottage cheese    · Meats and proteins , such as lean cuts of beef and pork (loin, leg, round), skinless chicken and turkey, legumes, soy products, egg whites, and nuts  Foods and drinks to limit or avoid:  Ask your dietitian or healthcare provider about these and other foods that are high in unhealthy fat, sodium, and sugar:  · Snack or packaged foods , such as frozen dinners, cookies, macaroni and cheese, and cereals with more than 300 mg of sodium per serving    · Canned or dry mixes  for cakes, soups, sauces, or gravies    · Vegetables with added sodium , such as instant potatoes, vegetables with added sauces, or regular canned vegetables    · Other foods high in sodium , such as ketchup, barbecue sauce, salad dressing, pickles, olives, soy sauce, and miso    · High-fat dairy foods  such as whole or 2% milk, cream cheese, or sour cream, and cheeses     · High-fat protein foods  such as high-fat cuts of beef (T-bone steaks, ribs), chicken or turkey with skin, and organ meats, such as liver    · Cured or smoked meats , such as hot dogs, johnson, and sausage    · Unhealthy fats and oils , such as butter, stick margarine, shortening, and cooking oils such as coconut or palm oil    · Food and drinks high in sugar , such as soft drinks (soda), sports drinks, sweetened tea, candy, cake, cookies, pies, and doughnuts  Other diet guidelines to follow:   · Eat more foods containing omega-3 fats  Eat fish high in omega-3 fats at least 2 times a week  · Limit alcohol  Too much alcohol can damage your heart and raise your blood pressure  Women should limit alcohol to 1 drink a day  Men should limit alcohol to 2 drinks a day  A drink of alcohol is 12 ounces of beer, 5 ounces of wine, or 1½ ounces of liquor  · Choose low-sodium foods  High-sodium foods can lead to high blood pressure  Add little or no salt to food you prepare  Use herbs and spices in place of salt  · Eat more fiber  to help lower cholesterol levels  Eat at least 5 servings of fruits and vegetables each day  Eat 3 ounces of whole-grain foods each day  Legumes (beans) are also a good source of fiber  Lifestyle guidelines:   · Do not smoke  Nicotine and other chemicals in cigarettes and cigars can cause lung and heart damage  Ask your healthcare provider for information if you currently smoke and need help to quit  E-cigarettes or smokeless tobacco still contain nicotine  Talk to your healthcare provider before you use these products  · Exercise regularly  to help you maintain a healthy weight and improve your blood pressure and cholesterol levels  Ask your healthcare provider about the best exercise plan for you  Do not start an exercise program without asking your healthcare provider     Follow up with your healthcare provider as directed:  Write down your questions so you remember to ask them during your visits  © 2017 [employer] Information is for End User's use only and may not be sold, redistributed or otherwise used for commercial purposes  All illustrations and images included in CareNotes® are the copyrighted property of [employer]  or Luis Felipe Spaulding  The above information is an  only  It is not intended as medical advice for individual conditions or treatments  Talk to your doctor, nurse or pharmacist before following any medical regimen to see if it is safe and effective for you  Warfarin (By mouth)   Warfarin (WAR-far-in)  Prevents and treats blood clots  May lower the risk of serious complications after a heart attack  This medicine is a blood thinner  Brand Name(s): Coumadin, Jantoven   There may be other brand names for this medicine  When This Medicine Should Not Be Used: This medicine is not right for everyone  Do not use it if you had an allergic reaction to warfarin, if you are pregnant, or if you have health problems that could cause bleeding  How to Use This Medicine:   Tablet  · Take your medicine as directed  Your dose may need to be changed several times to find what works best for you  · This medicine should come with a Medication Guide  Ask your pharmacist for a copy if you do not have one  · Missed dose: Take a dose as soon as you remember  If it is almost time for your next dose, wait until then and take a regular dose  Do not take extra medicine to make up for a missed dose  · Store the medicine in a closed container at room temperature, away from heat, moisture, and direct light  Drugs and Foods to Avoid:   Ask your doctor or pharmacist before using any other medicine, including over-the-counter medicines, vitamins, and herbal products    · Many medicines and foods can affect how warfarin works and may affect the PT/INR test results  Tell your doctor before you start or stop any medicine, especially the following:   ¨ Ginkgo, echinacea, goldenseal, or Ervin's wort  ¨ Another blood thinner, including apixaban, argatroban, bivalirudin, cilostazol, clopidogrel, dabigatran, desirudin, dipyridamole, heparin, lepirudin, prasugrel, rivaroxaban, ticlopidine  ¨ Medicine to treat depression or anxiety, including citalopram, desvenlafaxine, duloxetine, escitalopram, fluoxetine, fluvoxamine, milnacipran, paroxetine, sertraline, venlafaxine, vilazodone  ¨ Medicine to treat an infection  ¨ NSAID pain or arthritis medicine, including aspirin, celecoxib, diclofenac, diflunisal, fenoprofen, ibuprofen, indomethacin, ketoprofen, ketorolac, mefenamic acid, naproxen, oxaprozin, piroxicam, sulindac  Check labels for over-the-counter medicines to find out if they contain an NSAID  ¨ Steroid medicine, including dexamethasone, hydrocortisone, methylprednisolone, prednisolone, prednisone  · Warfarin works best if you eat about the same amount of vitamin K every day  Foods high in vitamin K include asparagus, broccoli, brussels sprouts, cabbage, green leafy vegetables, plums, rhubarb, and canola oil  Talk to your doctor before you make changes to your normal diet  · Do not eat grapefruit or drink grapefruit juice while you are using this medicine  Warnings While Using This Medicine:   · It is not safe to take this medicine during pregnancy  It could harm an unborn baby  Tell your doctor right away if you become pregnant  Use an effective form of birth control to keep from getting pregnant during treatment and for at least 1 month after your last dose  · Tell your doctor if you are breastfeeding, or if you have kidney disease, liver disease, diabetes, heart disease, heart failure, high blood pressure, an infection, a stomach ulcer, or protein C deficiency   Also tell your doctor if you had recent surgery or an injury, or a history of stroke, anemia, severe bleeding or bruising, or problems caused by heparin use  · This medicine may cause the following problems:   ¨ Bleeding, which may be life-threatening  ¨ Gangrene (skin or tissue damage)  ¨ Calciphylaxis or calcium uremic arteriolopathy  ¨ Purple toes syndrome  · You must have a PT/INR blood test while you use this medicine to check how well your blood is clotting  Your doctor will use the test results to make sure the medicine is working properly  Keep all appointments for the PT/INR blood tests  · You may bleed or bruise more easily with warfarin  To prevent injury or cuts, do not play rough sports, be careful with sharp objects, and brush and floss your teeth gently  Víctoranndilcia Xander your nose gently, and do not pick your nose  · Carry an ID card or wear a medical alert bracelet to let emergency caregivers know that you use warfarin  · Tell any doctor or dentist who treats you that you are using this medicine  You may need to stop using this medicine several days before you have surgery or medical tests  · Keep all medicine out of the reach of children  Never share your medicine with anyone    Possible Side Effects While Using This Medicine:   Call your doctor right away if you notice any of these side effects:  · Allergic reaction: Itching or hives, swelling in your face or hands, swelling or tingling in your mouth or throat, chest tightness, trouble breathing  · Bleeding from your gums or nose, coughing up blood, heavy monthly periods  · Bleeding that does not stop, bruising, or weakness  · Dizziness, fainting, lightheadedness  · Pain, brown or black skin, or skin that is cool to the touch  · Purple toes or feet, or new pain in your leg, foot, or toes  · Purplish red, net-like, blotchy spots on the skin  · Red or dark brown urine, or red or black, tarry stools  · Vomiting blood or material that looks like coffee grounds  If you notice other side effects that you think are caused by this medicine, tell your doctor  Call your doctor for medical advice about side effects  You may report side effects to FDA at 9-847-FDA-0900  © 2017 [address] [employer] Information is for End User's use only and may not be sold, redistributed or otherwise used for commercial purposes  The above information is an  only  It is not intended as medical advice for individual conditions or treatments  Talk to your doctor, nurse or pharmacist before following any medical regimen to see if it is safe and effective for you  Vitamin K in Foods   WHAT YOU NEED TO KNOW:   · Warfarin is a type of medicine called a blood thinner  It makes your blood clot more slowly  This can help prevent dangerous problems, such as a stroke (a blood clot in the brain)  Vitamin K helps your blood to clot (thicken to stop bleeding)  Warfarin works by making it harder for your body to use vitamin K to clot blood  Changes in the amount of vitamin K that you normally eat can affect how warfarin works  · Your healthcare provider can tell how well warfarin is working from a blood test that you will have regularly  This test is called an international normalized ratio (INR)  It shows how quickly your blood clots  To keep your INR at a healthy level, you need to manage how much vitamin K you eat  DISCHARGE INSTRUCTIONS:   While you take warfarin:  Eat the same amount of vitamin K each day  Do not change the amount of vitamin K you normally have from foods or supplements  This helps keep your INR at the same healthy level  · A big increase in vitamin K can lower your INR  This can cause dangerous clotting in your blood  · A big decrease in vitamin K can raise your INR  This can make it harder for your blood to clot  It can cause you to bleed too much  Do not avoid foods that contain vitamin K  Foods that contain vitamin K:  Dark green leafy vegetables have the highest amounts of vitamin K   Foods that contain vitamin K include the following:  · Foods with more than 100 mcg per serving:      ¨ ½ cup of cooked kale (531 mcg)    ¨ ½ cup of cooked spinach (444 mcg)    ¨ ½ cup of cooked alexa greens (418 mcg)    ¨ 1 cup of cooked broccoli (220 mcg)    ¨ 1 cup of cooked brussels sprouts (219 mcg)    ¨ 1 cup of raw alexa greens (184 mcg)    ¨ 1 cup of raw spinach (145 mcg)    ¨ 1 cup of raw endive (116 mcg)    · Foods with 50 to 100 mcg per serving:      ¨ 1 cup of raw broccoli (89 mcg)    ¨ ½ cup of cooked cabbage (82 mcg)    ¨ 1 cup of green leaf lettuce (71 mcg)    ¨ 1 cup of kiara lettuce (57 mcg)    · Foods with 15 to 50 mcg per serving:      ¨ 4 mckeon of asparagus (48 mcg)    ¨ 1 medium kiwi fruit (31 mcg)    ¨ 1 cup of raw blackberries or blueberries (29 mcg)    ¨ 1 cup of red or green grapes (23 mcg)    ¨ ½ cup of cooked peas (19 mcg)  Other sources of vitamin K:  Multivitamins and other supplements may contain 10 to 80 mcg of vitamin K  These amounts can cause changes in your INR  Read the labels of any supplements you take  Do not take more than 1 supplement that contains vitamin K  Talk to your healthcare provider about the supplements you are taking  Contact your healthcare provider if:   · You have a poor appetite  · You have an upset stomach  · You have hair loss  · You bruise more easily than normal      · You have questions about your medicines, supplements, or the amount of vitamin K you eat  Seek care immediately if:   · You cough up blood  · You have red or black bowel movements  · Your urine is red or dark brown  · You have bleeding from your gums or nose  · You have heavy bleeding from a wound that does not stop, or unusually heavy monthly periods  · You have a severe headache  © 2017 [address] [employer] Information is for End User's use only and may not be sold, redistributed or otherwise used for commercial purposes   All illustrations and images included in CareNotes® are the copyrighted property of [employer]  or Luis Felipe Spaulding  The above information is an  only  It is not intended as medical advice for individual conditions or treatments  Talk to your doctor, nurse or pharmacist before following any medical regimen to see if it is safe and effective for you

## 2018-04-06 NOTE — PHYSICAL THERAPY NOTE
Physical Therapy Progress Note     04/06/18 1340   Pain Assessment   Pain Assessment No/denies pain   Pain Score No Pain   Hospital Pain Intervention(s) Ambulation/increased activity   Response to Interventions tolerated   Restrictions/Precautions   Weight Bearing Precautions Per Order No   Braces or Orthoses Other (Comment)  (diabetic shoes)   Other Precautions Cardiac/sternal;Fall Risk;Telemetry   General   Chart Reviewed Yes   Response to Previous Treatment Patient with no complaints from previous session  Family/Caregiver Present No   Cognition   Overall Cognitive Status WFL   Arousal/Participation Alert; Responsive; Cooperative   Attention Attends with cues to redirect   Orientation Level Oriented X4   Subjective   Subjective Patient in chair prior to session, agreeable to physical therapy  Fatigue noted  Patient reports "I will only walk a little, they just gave me a suppository i dont want to get too far from my room "   Transfers   Sit to Stand 4  Minimal assistance   Additional items Assist x 1; Increased time required;Verbal cues   Stand to Sit 4  Minimal assistance   Additional items Assist x 1; Increased time required;Verbal cues   Stand pivot 4  Minimal assistance   Additional items Assist x 1; Increased time required;Verbal cues   Ambulation/Elevation   Gait pattern Excessively slow; Step to;Short stride   Gait Assistance 4  Minimal assist   Additional items Assist x 1;Verbal cues   Assistive Device Rolling walker   Distance 80 ft, 100 ft standing rest break in between   Balance   Static Sitting Fair +   Dynamic Sitting Fair   Static Standing Fair -   Ambulatory Fair -   Endurance Deficit   Endurance Deficit Yes   Endurance Deficit Description fatigue   Activity Tolerance   Activity Tolerance Patient tolerated treatment well;Patient limited by fatigue   Nurse Made Aware appropriate to see   Exercises   Hip Flexion Sitting;10 reps;AROM; Bilateral   Hip Abduction Sitting;10 reps;AROM; Bilateral   Hip Adduction Sitting;10 reps;AROM; Bilateral   Knee AROM Long Arc Quad Sitting;20 reps;AROM; Bilateral   Ankle Pumps Sitting;20 reps;AROM; Bilateral   Assessment   Prognosis Good   Problem List Decreased strength;Decreased endurance; Impaired balance;Decreased mobility   Assessment Patient able to ambulate 80ft, 100 ft, with standing rest break in between with RW, min assist  Patient requesting to stop ambulation secondary to recieving suppository and not wanting to walk any further  Fatigue and lower extremity weakness continued to be noted throughout ambulation  Occasional cues required for RW management when negotiating turns, with patient able to self correct  Patient would continue to benefit from physical therapy to improve functional mobility until medically cleared  Goals   Patient Goals to go to rehab   STG Expiration Date 04/14/18   Treatment Day 2   Plan   Treatment/Interventions Functional transfer training;LE strengthening/ROM; Therapeutic exercise; Endurance training;Gait training   Progress Progressing toward goals   PT Frequency 5x/wk   Recommendation   Recommendation Post acute IP rehab   Equipment Recommended Walker   PT - OK to Discharge Yes  (once medically cleared)     Anh Mak, PTA

## 2018-04-09 ENCOUNTER — TRANSITIONAL CARE MANAGEMENT (OUTPATIENT)
Dept: INTERNAL MEDICINE CLINIC | Facility: CLINIC | Age: 78
End: 2018-04-09

## 2018-04-12 ENCOUNTER — TELEPHONE (OUTPATIENT)
Dept: INTERNAL MEDICINE CLINIC | Age: 78
End: 2018-04-12

## 2018-04-12 NOTE — TELEPHONE ENCOUNTER
Ayleen Laura he has been experiencing called to give an update on Yanni  She will be discharged from [facility] on [place] 4/15/18  She was there for rehab post Transcatheter aortic valve replacement  She has done well post surgery  [facility] cardiology manages her coumadin  Currently she is on 6 mg on Sat and Sun, 3 mg all other days  She does have a cough and some congestion that is being attributed to seasonal allergies  [facility] will treat the symptoms with Flonase  Upon discharge, [facility] will forward a med list, H&P, and Discharge Summary

## 2018-04-15 NOTE — TELEPHONE ENCOUNTER
Spoke to [employer] at [address] [facility]  She will fax med list, labs, progress notes, H&P, and discharge summary to [phone]  Verified that pt is discharged as of now   4/15/18 12:59 PM

## 2018-04-16 ENCOUNTER — APPOINTMENT (EMERGENCY)
Dept: RADIOLOGY | Facility: HOSPITAL | Age: 78
DRG: 065 | End: 2018-04-16
Payer: MEDICARE

## 2018-04-16 ENCOUNTER — HOSPITAL ENCOUNTER (INPATIENT)
Facility: HOSPITAL | Age: 78
LOS: 3 days | Discharge: HOME HEALTH | DRG: 065 | End: 2018-04-19
Attending: EMERGENCY MEDICINE | Admitting: INTERNAL MEDICINE
Payer: MEDICARE

## 2018-04-16 ENCOUNTER — TELEPHONE (OUTPATIENT)
Dept: CARDIOLOGY CLINIC | Facility: CLINIC | Age: 78
End: 2018-04-16

## 2018-04-16 DIAGNOSIS — N17.9 ACUTE KIDNEY INJURY (HCC): ICD-10-CM

## 2018-04-16 DIAGNOSIS — I48.0 PAROXYSMAL ATRIAL FIBRILLATION (HCC): ICD-10-CM

## 2018-04-16 DIAGNOSIS — Z95.2 S/P TAVR (TRANSCATHETER AORTIC VALVE REPLACEMENT): ICD-10-CM

## 2018-04-16 DIAGNOSIS — I63.9 STROKE DETERMINED BY CLINICAL ASSESSMENT (HCC): ICD-10-CM

## 2018-04-16 DIAGNOSIS — Z79.4 TYPE 2 DIABETES MELLITUS WITH COMPLICATION, WITH LONG-TERM CURRENT USE OF INSULIN (HCC): ICD-10-CM

## 2018-04-16 DIAGNOSIS — E11.8 TYPE 2 DIABETES MELLITUS WITH COMPLICATION, WITH LONG-TERM CURRENT USE OF INSULIN (HCC): ICD-10-CM

## 2018-04-16 DIAGNOSIS — G45.9 TIA (TRANSIENT ISCHEMIC ATTACK): Primary | ICD-10-CM

## 2018-04-16 DIAGNOSIS — I63.9 CEREBROVASCULAR ACCIDENT (CVA), UNSPECIFIED MECHANISM (HCC): ICD-10-CM

## 2018-04-16 DIAGNOSIS — R47.01 EXPRESSIVE APHASIA: ICD-10-CM

## 2018-04-16 DIAGNOSIS — R77.8 ELEVATED TROPONIN: ICD-10-CM

## 2018-04-16 PROBLEM — E87.6 HYPOKALEMIA: Status: RESOLVED | Noted: 2018-04-03 | Resolved: 2018-04-16

## 2018-04-16 PROBLEM — I50.1 ACUTE CARDIOGENIC PULMONARY EDEMA (HCC): Status: RESOLVED | Noted: 2018-03-15 | Resolved: 2018-04-16

## 2018-04-16 PROBLEM — Z09 HOSPITAL DISCHARGE FOLLOW-UP: Status: RESOLVED | Noted: 2018-03-28 | Resolved: 2018-04-16

## 2018-04-16 PROBLEM — I50.33 ACUTE ON CHRONIC DIASTOLIC CHF (CONGESTIVE HEART FAILURE) (HCC): Status: RESOLVED | Noted: 2018-03-28 | Resolved: 2018-04-16

## 2018-04-16 PROBLEM — E83.51 HYPOCALCEMIA: Status: RESOLVED | Noted: 2018-04-03 | Resolved: 2018-04-16

## 2018-04-16 PROBLEM — I35.0 CRITICAL AORTIC VALVE STENOSIS: Status: RESOLVED | Noted: 2017-12-19 | Resolved: 2018-04-16

## 2018-04-16 LAB
ALBUMIN SERPL BCP-MCNC: 3.4 G/DL (ref 3.5–5)
ALP SERPL-CCNC: 73 U/L (ref 46–116)
ALT SERPL W P-5'-P-CCNC: 28 U/L (ref 12–78)
ANION GAP SERPL CALCULATED.3IONS-SCNC: 11 MMOL/L (ref 4–13)
APTT PPP: 47 SECONDS (ref 23–35)
AST SERPL W P-5'-P-CCNC: 48 U/L (ref 5–45)
ATRIAL RATE: 102 BPM
BASOPHILS # BLD AUTO: 0.03 THOUSANDS/ΜL (ref 0–0.1)
BASOPHILS NFR BLD AUTO: 1 % (ref 0–1)
BILIRUB SERPL-MCNC: 0.36 MG/DL (ref 0.2–1)
BUN SERPL-MCNC: 47 MG/DL (ref 5–25)
CALCIUM SERPL-MCNC: 7.1 MG/DL
CHLORIDE SERPL-SCNC: 103 MMOL/L (ref 100–108)
CO2 SERPL-SCNC: 24 MMOL/L (ref 21–32)
CREAT SERPL-MCNC: 1.48 MG/DL (ref 0.6–1.3)
EOSINOPHIL # BLD AUTO: 0.12 THOUSAND/ΜL (ref 0–0.61)
EOSINOPHIL NFR BLD AUTO: 2 % (ref 0–6)
ERYTHROCYTE [DISTWIDTH] IN BLOOD BY AUTOMATED COUNT: 18.9 % (ref 11.6–15.1)
GFR SERPL CREATININE-BSD FRML MDRD: 33 ML/MIN/1.73SQ M
GLUCOSE SERPL-MCNC: 105 MG/DL (ref 65–140)
GLUCOSE SERPL-MCNC: 125 MG/DL (ref 65–140)
HCT VFR BLD AUTO: 33.7 % (ref 34.8–46.1)
HGB BLD-MCNC: 10.6 G/DL (ref 11.5–15.4)
INR PPP: 3.14 (ref 0.86–1.16)
LYMPHOCYTES # BLD AUTO: 1.49 THOUSANDS/ΜL (ref 0.6–4.47)
LYMPHOCYTES NFR BLD AUTO: 24 % (ref 14–44)
MCH RBC QN AUTO: 21.3 PG (ref 26.8–34.3)
MCHC RBC AUTO-ENTMCNC: 31.5 G/DL (ref 31.4–37.4)
MCV RBC AUTO: 68 FL (ref 82–98)
MONOCYTES # BLD AUTO: 0.75 THOUSAND/ΜL (ref 0.17–1.22)
MONOCYTES NFR BLD AUTO: 12 % (ref 4–12)
NEUTROPHILS # BLD AUTO: 3.7 THOUSANDS/ΜL (ref 1.85–7.62)
NEUTS SEG NFR BLD AUTO: 61 % (ref 43–75)
NRBC BLD AUTO-RTO: 0 /100 WBCS
P AXIS: 79 DEGREES
PLATELET # BLD AUTO: 172 THOUSANDS/UL (ref 149–390)
POTASSIUM SERPL-SCNC: 4.5 MMOL/L (ref 3.5–5.3)
PROT SERPL-MCNC: 7.4 G/DL (ref 6.4–8.2)
PROTHROMBIN TIME: 32.7 SECONDS (ref 12.1–14.4)
QRS AXIS: -57 DEGREES
QRSD INTERVAL: 146 MS
QT INTERVAL: 386 MS
QTC INTERVAL: 503 MS
RBC # BLD AUTO: 4.98 MILLION/UL (ref 3.81–5.12)
SODIUM SERPL-SCNC: 138 MMOL/L (ref 136–145)
T WAVE AXIS: 97 DEGREES
TROPONIN I SERPL-MCNC: 0.06 NG/ML
VENTRICULAR RATE: 102 BPM
WBC # BLD AUTO: 6.1 THOUSAND/UL (ref 4.31–10.16)

## 2018-04-16 PROCEDURE — 36415 COLL VENOUS BLD VENIPUNCTURE: CPT | Performed by: EMERGENCY MEDICINE

## 2018-04-16 PROCEDURE — 82948 REAGENT STRIP/BLOOD GLUCOSE: CPT

## 2018-04-16 PROCEDURE — 84484 ASSAY OF TROPONIN QUANT: CPT | Performed by: EMERGENCY MEDICINE

## 2018-04-16 PROCEDURE — 80053 COMPREHEN METABOLIC PANEL: CPT | Performed by: EMERGENCY MEDICINE

## 2018-04-16 PROCEDURE — 70450 CT HEAD/BRAIN W/O DYE: CPT

## 2018-04-16 PROCEDURE — 99285 EMERGENCY DEPT VISIT HI MDM: CPT

## 2018-04-16 PROCEDURE — 93010 ELECTROCARDIOGRAM REPORT: CPT | Performed by: INTERNAL MEDICINE

## 2018-04-16 PROCEDURE — 85730 THROMBOPLASTIN TIME PARTIAL: CPT | Performed by: EMERGENCY MEDICINE

## 2018-04-16 PROCEDURE — 93005 ELECTROCARDIOGRAM TRACING: CPT | Performed by: EMERGENCY MEDICINE

## 2018-04-16 PROCEDURE — 71046 X-RAY EXAM CHEST 2 VIEWS: CPT

## 2018-04-16 PROCEDURE — 99223 1ST HOSP IP/OBS HIGH 75: CPT | Performed by: INTERNAL MEDICINE

## 2018-04-16 PROCEDURE — 85025 COMPLETE CBC W/AUTO DIFF WBC: CPT | Performed by: EMERGENCY MEDICINE

## 2018-04-16 PROCEDURE — 85610 PROTHROMBIN TIME: CPT | Performed by: EMERGENCY MEDICINE

## 2018-04-16 RX ORDER — SODIUM CHLORIDE 9 MG/ML
100 INJECTION, SOLUTION INTRAVENOUS CONTINUOUS
Status: DISCONTINUED | OUTPATIENT
Start: 2018-04-16 | End: 2018-04-17

## 2018-04-16 RX ORDER — DOCUSATE SODIUM 100 MG/1
100 CAPSULE, LIQUID FILLED ORAL 2 TIMES DAILY PRN
Status: DISCONTINUED | OUTPATIENT
Start: 2018-04-16 | End: 2018-04-19 | Stop reason: HOSPADM

## 2018-04-16 RX ORDER — ASPIRIN 81 MG/1
81 TABLET, CHEWABLE ORAL DAILY
Status: DISCONTINUED | OUTPATIENT
Start: 2018-04-17 | End: 2018-04-19 | Stop reason: HOSPADM

## 2018-04-16 RX ORDER — LEVOTHYROXINE SODIUM 0.1 MG/1
TABLET ORAL
Qty: 90 TABLET | Refills: 1 | OUTPATIENT
Start: 2018-04-16

## 2018-04-16 RX ORDER — POTASSIUM CHLORIDE 750 MG/1
10 TABLET, EXTENDED RELEASE ORAL 2 TIMES DAILY
Status: DISCONTINUED | OUTPATIENT
Start: 2018-04-16 | End: 2018-04-17

## 2018-04-16 RX ORDER — INSULIN GLARGINE 100 [IU]/ML
25 INJECTION, SOLUTION SUBCUTANEOUS
Status: DISCONTINUED | OUTPATIENT
Start: 2018-04-17 | End: 2018-04-17

## 2018-04-16 RX ORDER — METOPROLOL TARTRATE 50 MG/1
100 TABLET, FILM COATED ORAL EVERY 12 HOURS SCHEDULED
Status: DISCONTINUED | OUTPATIENT
Start: 2018-04-16 | End: 2018-04-19 | Stop reason: HOSPADM

## 2018-04-16 RX ORDER — MELATONIN
1000 DAILY
Status: DISCONTINUED | OUTPATIENT
Start: 2018-04-17 | End: 2018-04-19 | Stop reason: HOSPADM

## 2018-04-16 RX ORDER — ACETAMINOPHEN 325 MG/1
650 TABLET ORAL EVERY 6 HOURS PRN
Status: DISCONTINUED | OUTPATIENT
Start: 2018-04-16 | End: 2018-04-19 | Stop reason: HOSPADM

## 2018-04-16 RX ORDER — WARFARIN SODIUM 3 MG/1
3 TABLET ORAL
Status: DISCONTINUED | OUTPATIENT
Start: 2018-04-17 | End: 2018-04-19 | Stop reason: HOSPADM

## 2018-04-16 RX ORDER — FLUTICASONE PROPIONATE 110 UG/1
1 AEROSOL, METERED RESPIRATORY (INHALATION) EVERY 12 HOURS SCHEDULED
Status: DISCONTINUED | OUTPATIENT
Start: 2018-04-16 | End: 2018-04-19 | Stop reason: HOSPADM

## 2018-04-16 RX ORDER — LEVOTHYROXINE SODIUM 0.1 MG/1
100 TABLET ORAL
Status: DISCONTINUED | OUTPATIENT
Start: 2018-04-17 | End: 2018-04-19 | Stop reason: HOSPADM

## 2018-04-16 RX ORDER — INSULIN GLARGINE 100 [IU]/ML
25 INJECTION, SOLUTION SUBCUTANEOUS
Status: DISCONTINUED | OUTPATIENT
Start: 2018-04-16 | End: 2018-04-17

## 2018-04-16 RX ORDER — ATORVASTATIN CALCIUM 80 MG/1
80 TABLET, FILM COATED ORAL DAILY
Status: DISCONTINUED | OUTPATIENT
Start: 2018-04-17 | End: 2018-04-19 | Stop reason: HOSPADM

## 2018-04-16 RX ADMIN — INSULIN GLARGINE 25 UNITS: 100 INJECTION, SOLUTION SUBCUTANEOUS at 21:20

## 2018-04-16 RX ADMIN — POTASSIUM CHLORIDE 10 MEQ: 750 TABLET, EXTENDED RELEASE ORAL at 21:20

## 2018-04-16 RX ADMIN — SODIUM CHLORIDE 100 ML/HR: 0.9 INJECTION, SOLUTION INTRAVENOUS at 21:20

## 2018-04-16 RX ADMIN — METOPROLOL TARTRATE 100 MG: 50 TABLET ORAL at 21:19

## 2018-04-16 NOTE — ED PROVIDER NOTES
History  Chief Complaint   Patient presents with    Unable to speak     Per EMS patient d/c from [address] [place] [address] rehab  Family noted that patient was having a hard time getting her words out  Patient offers no verbal c/c at this time  An 49-year-old female with past medical history of CAD s/p CABG, aortic stenosis s/p TAVR (4/3/2018), carotid stenosis, CHF, diabetes, GERD, hyperlipidemia, hypertension, hypothyroidism, IBS, osteoarthritis and peripheral arterial disease; presents after three episodes of aphasia  Patient states the episodes occurred last evening  Patient recalls the episodes, however states she was unable to get the words out  Patient denied having a headache, visual changes, dizziness, lightheadedness, paresthesias and weakness during the episodes  Patient is unsure how long the episode lasted for  Patient denies having recurrence of symptoms today  Currently patient offers no complaints  Patient has never had similar symptoms  Patient was discharged from [facility] yesterday after spending time there for rehab secondary to her recent aortic valve replacement  Patient states she had been doing well at rehab  Patient does complain of a dry cough, which has been present for the past week  Patient otherwise denies recent fever, chills, chest pain, shortness of breath, abdominal pain, nausea, vomiting, diarrhea, peripheral edema and rashes  A/P:  Aphasia, three separate episodes yesterday evening however no recurrence today  Suspect possible TIA's  Currently patient has a intact neuro exam   Will obtain lab work, EKG and CT head for further evaluation  Cough, patient currently resting comfortably in no acute distress  Lungs clear to auscultation bilaterally  Will obtain chest x-ray to rule out pneumonia  History provided by:  Patient and medical records      Prior to Admission Medications   Prescriptions Last Dose Informant Patient Reported? Taking?    LANTUS 100 UNIT/ML subcutaneous injection  Self No Yes   Sig: Inject 30 Units under the skin 2 (two) times a day IN THE MORNING AND 30 UNITS AT BEDTIME   Patient taking differently: Inject 10 Units under the skin 2 (two) times a day IN THE MORNING AND 30 UNITS AT BEDTIME     Mometasone Furoate (ASMANEX HFA) 100 MCG/ACT AERO  Self No Yes   Sig: Inhale 2 puffs once daily   acetaminophen (TYLENOL) 650 mg CR tablet   No Yes   Sig: Take 1 tablet (650 mg total) by mouth every 8 (eight) hours as needed for mild pain Do not take in conjunction with Percocet     albuterol (VENTOLIN HFA) 90 mcg/act inhaler  Self Yes Yes   Sig: Inhale 108 mcg 2 (two) times a day as needed 2 puffs bid PRN   aspirin 81 MG tablet  Self Yes Yes   Sig: Take 81 mg by mouth daily   atorvastatin (LIPITOR) 80 mg tablet  Self Yes Yes   Sig: Take 80 mg by mouth daily   calcium carbonate (TUMS) 500 mg chewable tablet  Self Yes Yes   Sig: Chew 2 tablets 3 (three) times a day     cholecalciferol (VITAMIN D3) 1,000 units tablet  Self Yes Yes   Sig: Take 1,000 Units by mouth daily     diphenhydrAMINE (BANOPHEN) 50 mg capsule   Yes Yes   Sig: Take 50 mg by mouth daily   docusate sodium (COLACE) 100 mg capsule   No Yes   Sig: Take 1 capsule (100 mg total) by mouth 2 (two) times a day   esomeprazole (NexIUM) 20 mg capsule  Self Yes Yes   Sig: Take 20 mg by mouth daily in the early morning     furosemide (LASIX) 40 mg tablet  Self No Yes   Sig: Take 1 tablet (40 mg total) by mouth 2 (two) times a day   levothyroxine 100 mcg tablet  Self Yes Yes   Sig: Take 100 mcg by mouth daily   lisinopril (ZESTRIL) 5 mg tablet  Self Yes Yes   Sig: Take 2 5 mg by mouth daily at bedtime     loratadine (CLARITIN) 10 mg tablet  Self No Yes   Sig: Take 1 tablet (10 mg total) by mouth daily   metFORMIN (GLUCOPHAGE) 1000 MG tablet  Self Yes Yes   Si,000 mg 2 (two) times a day   metoprolol tartrate (LOPRESSOR) 100 mg tablet   No Yes   Sig: Take 1 tablet (100 mg total) by mouth every 12 (twelve) hours   nitroglycerin (NITROSTAT) 0 4 mg SL tablet  Self Yes Yes   Sig: Place 0 4 mg under the tongue every 3 (three) minutes as needed   potassium chloride (K-DUR,KLOR-CON) 10 mEq tablet  Self No Yes   Sig: Take 1 tablet (10 mEq total) by mouth 2 (two) times a day   warfarin (COUMADIN) 3 mg tablet  Self Yes Yes   Sig: Take 3 mg by mouth daily 6 mg Sat and Sun, 3 mg all other days       Facility-Administered Medications: None       Past Medical History:   Diagnosis Date    Altered mental status     Anticoagulated     Coumadin for Aflutter    Asthma     [facility] fl[facility]     maintained on coumadin anticoag    CAD (coronary artery disease)     Candidiasis     Carotid stenosis, bilateral     Cataract     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) ([facility])     Chronic fatigue syndrome     Chronic low back pain     Concussion w loss of consciousness of unsp duration, init     Coronary artery disease     Diabetes mellitus (HCC)     type 2, insulin dependent    DJD (degenerative joint disease)     GERD (gastroesophageal reflux disease)     Herpes zoster     HLD (hyperlipidemia)     HTN (hypertension)     Hyperlipidemia     Hypothyroidism     Irritable bowel syndrome     Lumbar radiculopathy     Lyme disease     Dx in hospital 8/2011    Lyme disease     MI (myocardial infarction) ([facility]     Migraines     Muscle spasm     Non-neoplastic nevus     Nontoxic multinodular goiter     OA (osteoarthritis)     Osteoarthritis     Other chronic pain     PAD (peripheral artery disease) ([facility])     Palpitations     Pseudogout     Spinal stenosis     Transient cerebral ischemia     Trigger ring finger     Viral gastroenteritis        Past Surgical History:   Procedure Laterality Date    APPENDECTOMY      ARTERIOGRAM  12/19/2017    CARDIAC CATHETERIZATION      CHOLECYSTECTOMY      COLONOSCOPY      CORONARY ARTERY BYPASS GRAFT  2004    LUMBAR LAMINECTOMY      MT ECHO TRANSESOPHAG R-T 2D W/PRB IMG ACQUISJ I&R N/A 4/3/2018    Procedure: TRANSESOPHAGEAL ECHOCARDIOGRAM (ROBB); Surgeon: Dennis Ramey DO;  Location: [facility];  Service: Cardiac Surgery    MT REPLACE AORTIC VALVE OPENFEMORAL ARTERY APPROACH N/A 4/3/2018    Procedure: REPLACEMENT AORTIC VALVE TRANSCATHETER (TAVR) TRANSFEMORAL w/ 26MM IVY YEVGENIY S3 VALVE;  Surgeon: Dennis Ramey DO;  Location: [facility];  Service: Cardiac Surgery    THYROIDECTOMY      TOTAL ABDOMINAL HYSTERECTOMY W/ BILATERAL SALPINGOOPHORECTOMY         Family History   Problem Relation Age of Onset    Cancer Mother     Stroke Mother     Cancer Father     Heart disease Father     Breast cancer Sister     Diabetes Daughter      Passed away January 2017      I have reviewed and agree with the history as documented  Social History   Substance Use Topics    Smoking status: Never Smoker    Smokeless tobacco: Never Used    Alcohol use No        Review of Systems   Respiratory: Positive for cough  Neurological: Positive for speech difficulty  All other systems reviewed and are negative        Physical Exam  ED Triage Vitals [04/16/18 1300]   Temp Pulse Respirations Blood Pressure SpO2   -- (!) 107 20 139/66 98 %      Temp src Heart Rate Source Patient Position - Orthostatic VS BP Location FiO2 (%)   -- Monitor Lying Right arm --      Pain Score       No Pain           Orthostatic Vital Signs  Vitals:    04/16/18 1423 04/16/18 1445 04/16/18 1515 04/16/18 1615   BP: 140/76 126/74 127/75 133/66   Pulse: 103 102 (!) 106 (!) 108   Patient Position - Orthostatic VS: Lying   Lying       Physical Exam  General Appearance: alert and oriented, nad, non toxic appearing  Skin:  Warm, dry, intact  HEENT: atraumatic, normocephalic; PERRLA, EOMI  Neck: Supple, trachea midline  Cardiac: RRR; no murmurs, rub, gallops  Pulmonary: lungs CTAB; no wheezes, rales, rhonchi  Gastrointestinal: abdomen soft, nontender, nondistended; no guarding or rebound tenderness; good bowel sounds, no mass or bruits  Extremities:  no pedal edema, 2+ pulses; no calf tenderness, no clubbing, no cyanosis  Neuro:  no focal motor or sensory deficits, CN 2-12 grossly intact, normal heel to shin  Psych:  Normal mood and affect, normal judgement and insight      ED Medications  Medications - No data to display    Diagnostic Studies  Results Reviewed     Procedure Component Value Units Date/Time    Protime-INR [[id number]]  (Abnormal) Collected:  04/16/18 1326    Lab Status:  Final result Specimen:  Blood from Arm, Left Updated:  04/16/18 1355     Protime 32 7 (H) seconds      INR 3 14 (H)    APTT [[id number]]  (Abnormal) Collected:  04/16/18 1326    Lab Status:  Final result Specimen:  Blood from Arm, Left Updated:  04/16/18 1355     PTT 47 (H) seconds     Troponin I [[id number]]  (Abnormal) Collected:  04/16/18 1326    Lab Status:  Final result Specimen:  Blood from Arm, Left Updated:  04/16/18 1354     Troponin I 0 06 (H) ng/mL     Narrative:         [employer] Chemistry analyzer 99% cutoff is > 0 04 ng/mL in network labs    o cTnI 99% cutoff is useful only when applied to patients in the clinical setting of myocardial ischemia  o cTnI 99% cutoff should be interpreted in the context of clinical history, ECG findings and possibly cardiac imaging to establish correct diagnosis  o cTnI 99% cutoff may be suggestive but clearly not indicative of a coronary event without the clinical setting of myocardial ischemia      Comprehensive metabolic panel [[id number]]  (Abnormal) Collected:  04/16/18 1326    Lab Status:  Final result Specimen:  Blood from Arm, Left Updated:  04/16/18 1352     Sodium 138 mmol/L      Potassium 4 5 mmol/L      Chloride 103 mmol/L      CO2 24 mmol/L      Anion Gap 11 mmol/L      BUN 47 (H) mg/dL      Creatinine 1 48 (H) mg/dL      Glucose 105 mg/dL      Calcium 7 1 mg/dL      AST 48 (H) U/L      ALT 28 U/L      Alkaline Phosphatase 73 U/L      Total Protein 7 4 g/dL Albumin 3 4 (L) g/dL      Total Bilirubin 0 36 mg/dL      eGFR 33 ml/min/1 73sq m     Narrative:         National Kidney Disease Education Program recommendations are as follows:  GFR calculation is accurate only with a steady state creatinine  Chronic Kidney disease less than 60 ml/min/1 73 sq  meters  Kidney failure less than 15 ml/min/1 73 sq  meters  CBC and differential [[id number]]  (Abnormal) Collected:  04/16/18 1326    Lab Status:  Final result Specimen:  Blood from Arm, Left Updated:  04/16/18 1343     WBC 6 10 Thousand/uL      RBC 4 98 Million/uL      Hemoglobin 10 6 (L) g/dL      Hematocrit 33 7 (L) %      MCV 68 (L) fL      MCH 21 3 (L) pg      MCHC 31 5 g/dL      RDW 18 9 (H) %      Platelets 879 Thousands/uL      nRBC 0 /100 WBCs      Neutrophils Relative 61 %      Lymphocytes Relative 24 %      Monocytes Relative 12 %      Eosinophils Relative 2 %      Basophils Relative 1 %      Neutrophils Absolute 3 70 Thousands/µL      Lymphocytes Absolute 1 49 Thousands/µL      Monocytes Absolute 0 75 Thousand/µL      Eosinophils Absolute 0 12 Thousand/µL      Basophils Absolute 0 03 Thousands/µL                  CT head without contrast   Final Result by Nilam David DO (04/16 3966)      No acute intracranial abnormality  Age related atrophy and findings most compatible with chronic small vessel ischemic disease  Workstation performed: [id number]         XR chest 2 views   Final Result by Piedad Adler DO (04/16 8546)      No acute cardiopulmonary disease              Workstation performed: [id number]               Procedures  Procedures   ECG 12 Lead Documentation  Date/Time: today/date: 4/16/2018  Performed by: Angella Gibbons    ECG reviewed by me, the ED Provider: yes    Patient location:  ED   Previous ECG:  Compared to current, no change   Rate:  102  ECG rate assessment: normal    Rhythm: sinus rhythm    Ectopy:  none    QRS axis:  Normal  Intervals: Left bundle branch block   Q waves: None   ST segments:  Normal  T waves: normal      Impression: LBBB, otherwise normal EKG      Phone Consults  ED Phone Contact    ED Course  ED Course as of Apr 16 1704   Mon Apr 16, 2018   1413 Pt does take coumadin INR: (!) 3 14   1426 Baseline 0 8 Creatinine: (!) 1 48   1426 Pt denies chest pain, questionably related to recent TAVR Troponin I: (!) 0 06   1548 Negative for acute findings  No additional episodes while in ED  Will admit for TIA and LEONARD CT head without contrast                               MDM  CritCare Time    Disposition  Final diagnoses:   TIA (transient ischemic attack)   Ac[facility] ([facility] )   Elevated troponin     Time reflects when diagnosis was documented in both MDM as applicable and the Disposition within this note     Time User Action Codes Description Comment    4/16/2018  4:36 PM Krypton, [address] [G45 9] TIA (transient ischemic attack)     4/16/2018  4:36 PM Berta, Janay Add [N17 9] Acute kidney injury ([facility] )     4/16/2018  4:36 PM Berta, [address] [R74 8] Elevated troponin       ED Disposition     ED Disposition Condition Comment    Admit  Case was discussed with SOD and the patient's admission status was agreed to be Admission Status: inpatient status to the service of Dr Rosas Andrade   Follow-up Information    None       Patient's Medications   Discharge Prescriptions    No medications on file     No discharge procedures on file  ED Provider  Attending physically available and evaluated Jeanette Moya I managed the patient along with the ED Attending      Electronically Signed by         Lauri Mello DO  04/16/18 3150

## 2018-04-16 NOTE — TELEPHONE ENCOUNTER
I agree with all of that  She should go to the hospital immediately  Thank you for emphasizing this to the patient's family

## 2018-04-16 NOTE — H&P
INTERNAL MEDICINE HISTORY AND PHYSICAL  ED [address] SOD Team C     NAME: Matilde Ackerman  AGE: [de-identified] y o  SEX: female  : 1940   MRN: [id number]  ENCOUNTER: [id number]    DATE: 2018  TIME: 5:40 PM    Primary Care Physician: Roldan Carter MD  Admitting Provider: Ricarda Lanes, MD    Chief complaint: Expressive aphasia    History of Present Illness     Matilde Ackerman is a [de-identified] y o  female with PMH notable for IDDM2, AS s/p TAVR 2 week ago, CAD, bilateral internal carotid stenosis who presents with a several day history of expressive aphasia  She states that she does not notice this, but her family does  She is very slow with her words  It was very noticeable to her yesterday when she tried to state the name of a type of flower and could not 'get the word out'  This has never happened to her before  She denies weakness or paresthesias  She denies difficulty with gait or coordination  She was discharged from inpatient rehabilitation within the last 48 hours and has no documented aphasia from that inpatient stay  She has no pain, no palpitations and no dyspnea  She notes that she has had a dry cough since leaving her rehabilitation facility  Review of Systems   Review of Systems   Constitutional: Negative for activity change, appetite change, chills, diaphoresis, fatigue, fever and unexpected weight change  HENT: Negative for congestion and rhinorrhea  Eyes: Negative for visual disturbance  Respiratory: Positive for cough  Negative for chest tightness and shortness of breath  Cardiovascular: Negative for chest pain, palpitations and leg swelling  Gastrointestinal: Negative for abdominal distention, abdominal pain, constipation, diarrhea, nausea and vomiting  Endocrine: Negative for polydipsia and polyuria  Genitourinary: Negative for difficulty urinating and dysuria  Musculoskeletal: Negative for arthralgias and myalgias  Skin: Negative for color change, pallor, rash and wound  Neurological: Positive for speech difficulty  Negative for dizziness, tremors, syncope, weakness, light-headedness, numbness and headaches  Hematological: Negative for adenopathy  Does not bruise/bleed easily  Psychiatric/Behavioral: Negative for agitation and confusion  All other systems reviewed and are negative        Past Medical History     Past Medical History:   Diagnosis Date    Altered mental status     Anticoagulated     Coumadin for Aflutter    Asthma     Atrial flutter (HCC)     maintained on coumadin anticoag    CAD (coronary artery disease)     Candidiasis     Carotid stenosis, bilateral     Cataract     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) ([facility])     Chronic fatigue syndrome     Chronic low back pain     Concussion w loss of consciousness of unsp duration, init     Coronary artery disease     [facility]     type 2, insulin dependent    DJD (degenerative joint disease)     GERD (gastroesophageal reflux disease)     Herpes zoster     HLD (hyperlipidemia)     HTN (hypertension)     Hyperlipidemia     Hypothyroidism     Irritable bowel syndrome     Lumbar radiculopathy     Lyme disease     Dx in hospital 8/2011    Lyme disease     [facility] infarction) [facility]     Migraines     Muscle spasm     Non-neoplastic nevus     Nontoxic multinodular goiter     OA (osteoarthritis)     Osteoarthritis     Other chronic pain     PAD (peripheral artery disease) ([facility])     Palpitations     Pseudogout     Spinal stenosis     Transient cerebral ischemia     Trigger ring finger     Viral gastroenteritis        Past Surgical History     Past Surgical History:   Procedure Laterality Date    APPENDECTOMY      ARTERIOGRAM  12/19/2017    CARDIAC CATHETERIZATION      CHOLECYSTECTOMY      COLONOSCOPY      CORONARY ARTERY BYPASS GRAFT  2004    LUMBAR LAMINECTOMY      AZ ECHO TRANSESOPHAG R-T 2D W/PRB IMG ACQUISJ I&R N/A 4/3/2018 Procedure: TRANSESOPHAGEAL ECHOCARDIOGRAM (ROBB); Surgeon: Ansley Mccall DO;  [facility]: [facility];  Service: Cardiac Surgery    RI REPLACE AORTIC VALVE OPENFEMORAL ARTERY APPROACH N/A 4/3/2018    Procedure: REPLACEMENT AORTIC VALVE TRANSCATHETER (TAVR) TRANSFEMORAL w/ 26MM IVY YEVGENIY S3 VALVE;  Surgeon: Ansley Mccall DO;  Location[facility];  Service: Cardiac Surgery    THYROIDECTOMY      TOTAL ABDOMINAL HYSTERECTOMY W/ BILATERAL SALPINGOOPHORECTOMY         Social History     History   Alcohol Use No     History   Drug Use No     History   Smoking Status    Never Smoker   Smokeless Tobacco    Never Used       Family History     Family History   Problem Relation Age of Onset    Cancer Mother     Stroke Mother     Cancer Father     Heart disease Father     Breast cancer Sister     Diabetes Daughter      Passed away January 2017        Medications Prior to Admission     Prior to Admission medications    Medication Sig Start Date End Date Taking? Authorizing Provider   acetaminophen (TYLENOL) 650 mg CR tablet Take 1 tablet (650 mg total) by mouth every 8 (eight) hours as needed for mild pain Do not take in conjunction with Percocet   4/6/18  Yes Amando Hernandez PA-C   albuterol (VENTOLIN HFA) 90 mcg/act inhaler Inhale 108 mcg 2 (two) times a day as needed 2 puffs bid PRN   Yes Historical Provider, MD   aspirin 81 MG tablet Take 81 mg by mouth daily   Yes Historical Provider, MD   atorvastatin (LIPITOR) 80 mg tablet Take 80 mg by mouth daily   Yes Historical Provider, MD   calcium carbonate (TUMS) 500 mg chewable tablet Chew 2 tablets 3 (three) times a day     Yes Historical Provider, MD   cholecalciferol (VITAMIN D3) 1,000 units tablet Take 1,000 Units by mouth daily   10/20/16  Yes Historical Provider, MD   diphenhydrAMINE (BANOPHEN) 50 mg capsule Take 50 mg by mouth daily   Yes Historical Provider, MD   docusate sodium (COLACE) 100 mg capsule Take 1 capsule (100 mg total) by mouth 2 (two) times a day 4/6/18  Yes Amando Hernandez PA-C   esomeprazole (NexIUM) 20 mg capsule Take 20 mg by mouth daily in the early morning     Yes Historical Provider, MD   furosemide (LASIX) 40 mg tablet Take 1 tablet (40 mg total) by mouth 2 (two) times a day 3/20/18  Yes Jacek Galloway DO   LANTUS 100 UNIT/ML subcutaneous injection Inject 30 Units under the skin 2 (two) times a day IN THE MORNING AND 30 UNITS AT BEDTIME  Patient taking differently: Inject 10 Units under the skin 2 (two) times a day IN THE MORNING AND 30 UNITS AT BEDTIME   3/28/18  Yes Channing Caicedo PA-C   levothyroxine 100 mcg tablet Take 100 mcg by mouth daily   Yes Historical Provider, MD   lisinopril (ZESTRIL) 5 mg tablet Take 2 5 mg by mouth daily at bedtime     Yes Historical Provider, MD   loratadine (CLARITIN) 10 mg tablet Take 1 tablet (10 mg total) by mouth daily 3/28/18  Yes Channing Caicedo PA-C   metFORMIN (GLUCOPHAGE) 1000 MG tablet 1,000 mg 2 (two) times a day   Yes Historical Provider, MD   metoprolol tartrate (LOPRESSOR) 100 mg tablet Take 1 tablet (100 mg total) by mouth every 12 (twelve) hours 4/6/18  Yes Amando Hernandez PA-C   Mometasone Furoate [facility] HFA) 100 MCG/ACT AERO Inhale 2 puffs once daily 3/12/18  Yes Ciera Dimas MD   nitroglycerin (NITROSTAT) 0 4 mg SL tablet Place 0 4 mg under the tongue every 3 (three) minutes as needed 5/11/15  Yes Historical Provider, MD   potassium chloride (K-DUR,KLOR-CON) 10 mEq tablet Take 1 tablet (10 mEq total) by mouth 2 (two) times a day 3/20/18  Yes Natasha Potts DO   warfarin (COUMADIN) 3 mg tablet Take 3 mg by mouth daily 6 mg Sat and Sun, 3 mg all other days  6/27/16  Yes Historical Provider, MD   polyethylene glycol (GLYCOLAX) powder Take 17 g by mouth daily for 28 days 4/6/18 4/16/18  Amando Hernandez PA-C       Allergies     Allergies   Allergen Reactions    Other Chest Pain     IVP-listed as "chest pain" in previous chart, patient stated this occurred "a long time ago" but does not remember exactly occurred     Cephalosporins Chest Pain    Contrast [Iodinated Diagnostic Agents] Other (See Comments)     Flash pulm edema    Doxycycline Chest Pain    Levaquin [Levofloxacin] Chest Pain    Ondansetron Chest Pain     Prolonged QT    Toradol [Ketorolac Tromethamine] Chest Pain       Objective     Vitals:    04/16/18 1423 04/16/18 1445 04/16/18 1515 04/16/18 1615   BP: 140/76 126/74 127/75 133/66   BP Location: Right arm   Right arm   Pulse: 103 102 (!) 106 (!) 108   Resp: 18   18   SpO2: 95% 96% 97% 99%     There is no height or weight on file to calculate BMI  No intake or output data in the 24 hours ending 04/16/18 1740  Invasive Devices          No matching active lines, drains, or airways          Physical Exam  GENERAL: Sitting in bed, NAD   HEENT: NC/AT  Anicteric, no injection  Membranes mildly dry  NECK: Supple, no JVD   CARDIOVASCULAR: S1 and S2 are present  Slightly tachycardic to 90-100bpm   RESPIRATORY: CTA BL   ABDOMINAL: Soft NT/ND   EXTREMITIES: 2+ DP , no CCE   MUSCULOSKELETAL: No deformity   NEUROLOGIC: AAOx3, strength full and equal in all extremities  Rapid alternating movmements intact  SKIN: No rash or erythema   PSYCHIATRIC: Normal mood and affect     Lab Results: I have personally reviewed pertinent reports      Labs Reviewed   CBC AND DIFFERENTIAL - Abnormal        Result Value Ref Range Status    WBC 6 10  4 31 - 10 16 Thousand/uL Final    RBC 4 98  3 81 - 5 12 Million/uL Final    Hemoglobin 10 6 (*) 11 5 - 15 4 g/dL Final    Hematocrit 33 7 (*) 34 8 - 46 1 % Final    MCV 68 (*) 82 - 98 fL Final    MCH 21 3 (*) 26 8 - 34 3 pg Final    MCHC 31 5  31 4 - 37 4 g/dL Final    RDW 18 9 (*) 11 6 - 15 1 % Final    Platelets 838  476 - 390 Thousands/uL Final    nRBC 0  /100 WBCs Final    Neutrophils Relative 61  43 - 75 % Final    Lymphocytes Relative 24  14 - 44 % Final    Monocytes Relative 12  4 - 12 % Final    Eosinophils Relative 2  0 - 6 % Final    Basophils Relative 1  0 - 1 % Final    Neutrophils Absolute 3 70  1 85 - 7 62 Thousands/µL Final    Lymphocytes Absolute 1 49  0 60 - 4 47 Thousands/µL Final    Monocytes Absolute 0 75  0 17 - 1 22 Thousand/µL Final    Eosinophils Absolute 0 12  0 00 - 0 61 Thousand/µL Final    Basophils Absolute 0 03  0 00 - 0 10 Thousands/µL Final   PROTIME-INR - Abnormal     Protime 32 7 (*) 12 1 - 14 4 seconds Final    INR 3 14 (*) 0 86 - 1 16 Final   APTT - Abnormal     PTT 47 (*) 23 - 35 seconds Final    Comment: Therapeutic Heparin Range =  60-90 seconds   COMPREHENSIVE METABOLIC PANEL - Abnormal     Sodium 138  136 - 145 mmol/L Final    Potassium 4 5  3 5 - 5 3 mmol/L Final    Chloride 103  100 - 108 mmol/L Final    CO2 24  21 - 32 mmol/L Final    Anion Gap 11  4 - 13 mmol/L Final    BUN 47 (*) 5 - 25 mg/dL Final    Creatinine 1 48 (*) 0 60 - 1 30 mg/dL Final    Comment: Standardized to IDMS reference method    Glucose 105  65 - 140 mg/dL Final    Comment:   If the patient is fasting, the ADA then defines impaired fasting glucose as > 100 mg/dL and diabetes as > or equal to 123 mg/dL  Specimen collection should occur prior to Sulfasalazine administration due to the potential for falsely depressed results  Specimen collection should occur prior to Sulfapyridine administration due to the potential for falsely elevated results  Calcium 7 1  mg/dL Final    AST 48 (*) 5 - 45 U/L Final    Comment:   Specimen collection should occur prior to Sulfasalazine administration due to the potential for falsely depressed results  ALT 28  12 - 78 U/L Final    Comment:   Specimen collection should occur prior to Sulfasalazine and/or Sulfapyridine administration due to the potential for falsely depressed results       Alkaline Phosphatase 73  46 - 116 U/L Final    Total Protein 7 4  6 4 - 8 2 g/dL Final    Albumin 3 4 (*) 3 5 - 5 0 g/dL Final    Total Bilirubin 0 36  0 20 - 1 00 mg/dL Final    eGFR 33  ml/min/1 73sq m Final Narrative:     National Kidney Disease Education Program recommendations are as follows:  GFR calculation is accurate only with a steady state creatinine  Chronic Kidney disease less than 60 ml/min/1 73 sq  meters  Kidney failure less than 15 ml/min/1 73 sq  meters  TROPONIN I - Abnormal     Troponin I 0 06 (*) <=0 04 ng/mL Final    Comment:  Results indicate test should be repeated on new specimen collected within 4-6 hours of the original     Narrative:     [employer] Chemistry analyzer 99% cutoff is > 0 04 ng/mL in network labs    o cTnI 99% cutoff is useful only when applied to patients in the clinical setting of myocardial ischemia  o cTnI 99% cutoff should be interpreted in the context of clinical history, ECG findings and possibly cardiac imaging to establish correct diagnosis  o cTnI 99% cutoff may be suggestive but clearly not indicative of a coronary event without the clinical setting of myocardial ischemia  Imaging: I have personally reviewed pertinent films in PACS  Xr Chest Portable    Result Date: 4/4/2018  Narrative: CHEST INDICATION:   Post Open Heart Surgey  COMPARISON:  4/3/2018 EXAM PERFORMED/VIEWS:  XR CHEST PORTABLE FINDINGS:  Stable right IJ catheter, tip in the caval atrial junction region  Heart shadow is enlarged but unchanged from prior exam  Decreased small left effusion  No new consolidation  No pneumothorax  Osseous structures appear within normal limits for patient age  Impression: Decreased small left effusion  No new consolidation  Workstation performed: [id number]     Xr Chest 2 Views    Result Date: 4/16/2018  Narrative: CHEST INDICATION:   cough  COMPARISON:  4/4/2018 EXAM PERFORMED/VIEWS:  XR CHEST PA & LATERAL Images: 2 FINDINGS: Heart shadow is enlarged but unchanged from prior exam  The lungs are clear  No pneumothorax or pleural effusion  Osseous structures appear within normal limits for patient age  Impression: No acute cardiopulmonary disease  Workstation performed: [id number]     Ct Head Without Contrast    Result Date: 4/16/2018  Narrative: CT BRAIN - WITHOUT CONTRAST INDICATION:   Family noticed patient having difficulty with speech  COMPARISON:  CT head 6/27/2017 TECHNIQUE:  CT examination of the brain was performed  In addition to axial images, coronal 2D reformatted images were created and submitted for interpretation  Radiation dose length product (DLP) for this visit:  950 4 mGy-cm   This examination, like all CT scans performed in the [facility], was performed utilizing techniques to minimize radiation dose exposure, including the use of iterative reconstruction and automated exposure control  IMAGE QUALITY:  Diagnostic  FINDINGS: PARENCHYMA:  No intracranial mass, mass effect or midline shift  No CT signs of acute infarction  No acute intracranial hemorrhage  Age-related cortical atrophy  Advanced periventricular and subcortical white matter hypodensity which is nonspecific although most compatible with chronic small vessel ischemic disease  Vascular and bilateral basal ganglia calcifications  VENTRICLES AND EXTRA-AXIAL SPACES:  Normal for the patient's age  VISUALIZED ORBITS AND PARANASAL SINUSES:  No acute abnormality involving the orbits  Mild scattered sinus mucosal thickening is noted  No fluid levels are seen  CALVARIUM AND EXTRACRANIAL SOFT TISSUES:  Normal      Impression: No acute intracranial abnormality  Age related atrophy and findings most compatible with chronic small vessel ischemic disease  Workstation performed: [id number]     Vas Lower Limb Arterial Duplex, Limited, Unilateral    Result Date: 3/19/2018  Narrative:  [facility] REPORT CLINICAL: Indications:  Physician concerned with weakened pulses at right ankle  Risk Factors: The patient has history of Diabetes, Hyperlipidemia, obesity and Hypertension   Operative History CABG lower extremity a-gram 12/2017 Left Brachial Pressure:  128/ mmHg    FINDINGS:  Right                  PSV  Common Femoral Artery   43  Prox Profunda           23  Prox SFA                44  Mid SFA                 48  Dist SFA                72  Proximal Pop            26  Distal Pop              33  Dist Post Tibial        14  Dist  Ant  Tibial       30     CONCLUSION: Impression: RIGHT LOWER LIMB: Evaluation shows diffuse atherosclerotic disease throughout the femoro-popliteal and tibio-peroneal segments  Distal anterior tibial artery poorly insonated  Ankle/Brachial index: Non-compressible  Prior: same finding  PVR/ PPG tracings are dampened  Metatarsal pressure of 53 mmHg Great toe pressure of 25 mmHg, below the healing range  Prior: 27 mmHg  Compared to previous study on 2/27/18, there is no significant change  SIGNATURE: Electronically Signed by: Thad Sever, MD on 2018-03-19 05:37:03 PM    Xr Chest Portable Icu    Result Date: 4/3/2018  Narrative: CHEST portable INDICATION: S/P TAVR COMPARISON: March 16, 2018 VIEWS:  AP semierect; IMAGES:  1 FINDINGS: Right internal jugular catheter tip at the atrial caval junction  Endotracheal tube tip 4 8 cm above the dwayne  Midline sternotomy wires are present from previous cardiac surgery  The cardiomediastinal silhouette is unchanged  Aortic valve replacement prosthesis, new in the interval  Blunting left costophrenic angle may represent small pleural effusion  Lungs are otherwise well aerated without opacities  Bony thorax is otherwise unremarkable  Electrode (EKG) monitoring devices overlie the chest      Impression: Lines and tubes as described above  Status post aortic valve replacement  No pneumothorax  Small left pleural effusion questioned    Workstation performed: [id number]       Urinalysis:       Invalid input(s): URIBILINOGEN     Urine Micro:        EKG, Pathology, and Other Studies: I have personally reviewed pertinent reports        Medications given in Emergency Department       Assessment and Plan Expressive Aphasia  -Differential remains broad, including decreased perfusion due to volume contraction, hypoglycemia, TIA/CVA  -Patient is not below therapeutic range of INR   -Neuro checks Q4H  -Will not use stroke pathway due to subacute presentation  -Will consult neurology  Elevated troponin  -Significance unclear given recent cardiac procedure and tachycardia with known CAD  -Clinically stable  -Monitor with telemetry  Acute kidney injury  -Likely prerenal with volume contraction on exam   -Start normal saline, 100cc/hr   -Hold ACEI  -Trend Cr  Chronic heart failure with AS s/p TAVR  -Patient on 40mg BID furosemide at home  -Appears volume contracted on exam, likely iatrogenic  -Hold furosemide, start NS at 100cc/hr with anticipated 1L resuscitation overnight  -Cerebral hypoperfusion could cause symptoms, as above   -Consult cardiology  IDDM2  -Diabetic diet   -On 30u BID basal insulin with fair control at home at 7 5%  -Use 25u BID basal insulin with QID fingerstick  -Hold metformin with LEONARD  -Goal blood sugar < 180 in hospital     CAD s/p CABG  -Continue ASA, high intensity statin    Atrial fibrillation/flutter  -Continue rate control strategy and anticoagulation with coumadin  Asthma  -Continue inhaled steroid  -beta agonist therapy PRN  Code Status: Level 1 - Full Code  VTE Pharmacologic Prophylaxis: Anticoagulated on Warfarin   VTE Mechanical Prophylaxis: sequential compression device  Admission Status: INPATIENT     Admission Time  I spent 45 minutes admitting the patient  This involved direct patient contact where I performed a full history and physical, reviewing previous records, and reviewing laboratory and other diagnostic studies      López Navarro  Internal Medicine  PGY-3

## 2018-04-16 NOTE — TELEPHONE ENCOUNTER
P/C from nurse at [facility] pt for the first time this Am  Pt had TAVR 4/3/18  Per pt's son, who is with the pt now, her speech is not normal, has difficulty with writing--when trying to write the time, is writing check marks on the paper  Also, pt says she is feeling foggy in the head  Pt is on coumadin  Advised that family call 9-1-1 and get to the Er ASAP  Pt will be going to [facility]

## 2018-04-16 NOTE — ED NOTES
Asked patient to stay, "No ifs and or buts" and could not repeat back but could say "the balloon is blue"      Bony Shaw RN  04/16/18 1932

## 2018-04-16 NOTE — ED ATTENDING ATTESTATION
Bakari Domínguez MD, saw and evaluated the patient  I have discussed the patient with the resident/non-physician practitioner and agree with the resident's/non-physician practitioner's findings, Plan of Care, and MDM as documented in the resident's/non-physician practitioner's note, except where noted  All available labs and Radiology studies were reviewed  At this point I agree with the current assessment done in the Emergency Department  I have conducted an independent evaluation of this patient a history and physical is as follows:    Patient brought in after having several episodes since yesterday of difficulty speaking  Currently, symptoms have resolved, but patient was having difficulty getting her words out  There was no reported numbness or weakness  Patient denies any headache or head injury  No prior episode of same  Exam: AAOx3, NAD, RRR, CTA, S/NT/ND, no motor/sensory deficits  A/P:  TIA  Will check cardiac labs and will CT head to evaluate for any signs of stroke  Plan to admit      Critical Care Time  CritCare Time    Procedures

## 2018-04-17 ENCOUNTER — APPOINTMENT (INPATIENT)
Dept: NON INVASIVE DIAGNOSTICS | Facility: HOSPITAL | Age: 78
DRG: 065 | End: 2018-04-17
Payer: MEDICARE

## 2018-04-17 ENCOUNTER — APPOINTMENT (INPATIENT)
Dept: RADIOLOGY | Facility: HOSPITAL | Age: 78
DRG: 065 | End: 2018-04-17
Payer: MEDICARE

## 2018-04-17 PROBLEM — I63.9 CVA (CEREBRAL VASCULAR ACCIDENT) (HCC): Status: ACTIVE | Noted: 2018-04-17

## 2018-04-17 LAB
ALBUMIN SERPL BCP-MCNC: 2.9 G/DL (ref 3.5–5)
ALP SERPL-CCNC: 60 U/L (ref 46–116)
ALT SERPL W P-5'-P-CCNC: 21 U/L (ref 12–78)
ANION GAP SERPL CALCULATED.3IONS-SCNC: 9 MMOL/L (ref 4–13)
AST SERPL W P-5'-P-CCNC: 38 U/L (ref 5–45)
BILIRUB SERPL-MCNC: 0.5 MG/DL (ref 0.2–1)
BUN SERPL-MCNC: 36 MG/DL (ref 5–25)
CALCIUM SERPL-MCNC: 6.6 MG/DL (ref 8.3–10.1)
CHLORIDE SERPL-SCNC: 107 MMOL/L (ref 100–108)
CO2 SERPL-SCNC: 23 MMOL/L (ref 21–32)
CREAT SERPL-MCNC: 1.07 MG/DL (ref 0.6–1.3)
ERYTHROCYTE [DISTWIDTH] IN BLOOD BY AUTOMATED COUNT: 19 % (ref 11.6–15.1)
GFR SERPL CREATININE-BSD FRML MDRD: 49 ML/MIN/1.73SQ M
GLUCOSE SERPL-MCNC: 138 MG/DL (ref 65–140)
GLUCOSE SERPL-MCNC: 159 MG/DL (ref 65–140)
GLUCOSE SERPL-MCNC: 167 MG/DL (ref 65–140)
GLUCOSE SERPL-MCNC: 78 MG/DL (ref 65–140)
GLUCOSE SERPL-MCNC: 80 MG/DL (ref 65–140)
HCT VFR BLD AUTO: 29.9 % (ref 34.8–46.1)
HGB BLD-MCNC: 9.3 G/DL (ref 11.5–15.4)
MCH RBC QN AUTO: 20.9 PG (ref 26.8–34.3)
MCHC RBC AUTO-ENTMCNC: 31.1 G/DL (ref 31.4–37.4)
MCV RBC AUTO: 67 FL (ref 82–98)
PLATELET # BLD AUTO: 140 THOUSANDS/UL (ref 149–390)
POTASSIUM SERPL-SCNC: 3.8 MMOL/L (ref 3.5–5.3)
PROT SERPL-MCNC: 6.4 G/DL (ref 6.4–8.2)
RBC # BLD AUTO: 4.44 MILLION/UL (ref 3.81–5.12)
SODIUM SERPL-SCNC: 139 MMOL/L (ref 136–145)
WBC # BLD AUTO: 5.26 THOUSAND/UL (ref 4.31–10.16)

## 2018-04-17 PROCEDURE — G8988 SELF CARE GOAL STATUS: HCPCS

## 2018-04-17 PROCEDURE — 85027 COMPLETE CBC AUTOMATED: CPT | Performed by: INTERNAL MEDICINE

## 2018-04-17 PROCEDURE — 99233 SBSQ HOSP IP/OBS HIGH 50: CPT | Performed by: INTERNAL MEDICINE

## 2018-04-17 PROCEDURE — G8979 MOBILITY GOAL STATUS: HCPCS

## 2018-04-17 PROCEDURE — 80053 COMPREHEN METABOLIC PANEL: CPT | Performed by: INTERNAL MEDICINE

## 2018-04-17 PROCEDURE — 97163 PT EVAL HIGH COMPLEX 45 MIN: CPT

## 2018-04-17 PROCEDURE — 70551 MRI BRAIN STEM W/O DYE: CPT

## 2018-04-17 PROCEDURE — 99223 1ST HOSP IP/OBS HIGH 75: CPT | Performed by: PSYCHIATRY & NEUROLOGY

## 2018-04-17 PROCEDURE — G8978 MOBILITY CURRENT STATUS: HCPCS

## 2018-04-17 PROCEDURE — 82948 REAGENT STRIP/BLOOD GLUCOSE: CPT

## 2018-04-17 PROCEDURE — 97166 OT EVAL MOD COMPLEX 45 MIN: CPT

## 2018-04-17 PROCEDURE — G8987 SELF CARE CURRENT STATUS: HCPCS

## 2018-04-17 PROCEDURE — 93880 EXTRACRANIAL BILAT STUDY: CPT

## 2018-04-17 PROCEDURE — 99222 1ST HOSP IP/OBS MODERATE 55: CPT | Performed by: INTERNAL MEDICINE

## 2018-04-17 RX ORDER — INSULIN GLARGINE 100 [IU]/ML
20 INJECTION, SOLUTION SUBCUTANEOUS
Status: DISCONTINUED | OUTPATIENT
Start: 2018-04-17 | End: 2018-04-18

## 2018-04-17 RX ORDER — INSULIN GLARGINE 100 [IU]/ML
20 INJECTION, SOLUTION SUBCUTANEOUS
Status: DISCONTINUED | OUTPATIENT
Start: 2018-04-18 | End: 2018-04-18

## 2018-04-17 RX ADMIN — POTASSIUM CHLORIDE 10 MEQ: 750 TABLET, EXTENDED RELEASE ORAL at 08:52

## 2018-04-17 RX ADMIN — VITAMIN D, TAB 1000IU (100/BT) 1000 UNITS: 25 TAB at 08:53

## 2018-04-17 RX ADMIN — LEVOTHYROXINE SODIUM 100 MCG: 100 TABLET ORAL at 05:23

## 2018-04-17 RX ADMIN — INSULIN GLARGINE 25 UNITS: 100 INJECTION, SOLUTION SUBCUTANEOUS at 08:52

## 2018-04-17 RX ADMIN — INSULIN GLARGINE 20 UNITS: 100 INJECTION, SOLUTION SUBCUTANEOUS at 22:07

## 2018-04-17 RX ADMIN — FLUTICASONE PROPIONATE 1 PUFF: 110 AEROSOL, METERED RESPIRATORY (INHALATION) at 08:53

## 2018-04-17 RX ADMIN — FLUTICASONE PROPIONATE 1 PUFF: 110 AEROSOL, METERED RESPIRATORY (INHALATION) at 22:00

## 2018-04-17 RX ADMIN — ASPIRIN 81 MG 81 MG: 81 TABLET ORAL at 08:53

## 2018-04-17 RX ADMIN — WARFARIN SODIUM 3 MG: 3 TABLET ORAL at 18:07

## 2018-04-17 RX ADMIN — SODIUM CHLORIDE 100 ML/HR: 0.9 INJECTION, SOLUTION INTRAVENOUS at 08:57

## 2018-04-17 RX ADMIN — ATORVASTATIN CALCIUM 80 MG: 80 TABLET, FILM COATED ORAL at 08:52

## 2018-04-17 RX ADMIN — METOPROLOL TARTRATE 100 MG: 50 TABLET ORAL at 22:00

## 2018-04-17 NOTE — CASE MANAGEMENT
THIS IS A MEDICARE READMISSION  [facility] ADMISSION 4/3/18 - 4/6/18  *CASE TASKED TO PA FOR MEDICARE READMISSION + 2ND LEVEL REVIEW      Initial Clinical Review    Admission: Date/Time/Statement:   4/16/18 AT 1637     Orders Placed This Encounter   Procedures    Inpatient Admission (expected length of stay for this patient is greater than two midnights)     Standing Status:   Standing     Number of Occurrences:   1     Order Specific Question:   Admitting Physician     Answer:   Adalberto Armas     Order Specific Question:   Level of Care     Answer:   Med Surg [16]     Order Specific Question:   Estimated length of stay     Answer:   More than 2 Midnights     Order Specific Question:   Certification     Answer:   I certify that inpatient services are medically necessary for this patient for a duration of greater than two midnights  See H&P and MD Progress Notes for additional information about the patient's course of treatment  ED: Date/Time/Mode of Arrival:   ED Arrival Information     Expected Arrival Acuity Means of Arrival Escorted By Service Admission Type    - 4/16/2018 12:46 Urgent Ambulance [facility] General Medicine Urgent    Arrival Complaint    confusion        Chief Complaint:   Chief Complaint   Patient presents with    Unable to speak     Per EMS patient d/c from [address] [place] [address] rehab  Family noted that patient was having a hard time getting her words out  Patient offers no verbal c/c at this time  Chief complaint: Expressive aphasia     History of Present Illness      Edward Mcbride is a [de-identified] y o  female with PMH notable for IDDM2, AS s/p TAVR 2 week ago, CAD, bilateral internal carotid stenosis who presents with a several day history of expressive aphasia  She states that she does not notice this, but her family does  She is very slow with her words    It was very noticeable to her yesterday when she tried to state the name of a type of flower and could not 'get the word out'  This has never happened to her before  She denies weakness or paresthesias  She denies difficulty with gait or coordination      She was discharged from inpatient rehabilitation within the last 48 hours and has no documented aphasia from that inpatient stay        She has no pain, no palpitations and no dyspnea      She notes that she has had a dry cough since leaving her rehabilitation facility      Review of Systems     Constitutional: Negative for activity change, appetite change, chills, diaphoresis, fatigue, fever and unexpected weight change  Respiratory: Positive for cough  Negative for chest tightness and shortness of breath  Cardiovascular: Negative for chest pain, palpitations and leg swelling  Gastrointestinal: Negative for abdominal distention, abdominal pain, constipation, diarrhea, nausea and vomiting  Musculoskeletal: Negative for arthralgias and myalgias  Skin: Negative for color change, pallor, rash and wound  Neurological: Positive for speech difficulty  Negative for dizziness, tremors, syncope, weakness, light-headedness, numbness and headaches           ED Vital Signs:   ED Triage Vitals   Temperature Pulse Respirations Blood Pressure SpO2   04/16/18 2020 04/16/18 1300 04/16/18 1300 04/16/18 1300 04/16/18 1300   98 9 °F (37 2 °C) (!) 107 20 139/66 98 %      Temp Source Heart Rate Source Patient Position - Orthostatic VS BP Location FiO2 (%)   04/16/18 2020 04/16/18 1300 04/16/18 1300 04/16/18 1300 --   Oral Monitor Lying Right arm       Pain Score       04/16/18 1300       No Pain        Wt Readings from Last 1 Encounters:   04/16/18 76 2 kg (167 lb 15 9 oz)      04/16 0701  04/17 0700 04/17 0701  04/17 1240  Most Recent    Temperature (°F) 98 999 4 98 9  98 9 (37 2)    Pulse 83112 8283  83    Respirations 1820 18  18    Blood Pressure 104/47140/76 101/61113/68  113/68    SpO2 (%) 9599 9599  99        LABS/Diagnostic Test Results:   CBC  with diff  Results from last 7 days  Lab Units 04/17/18  0447 04/16/18  1326   WBC Thousand/uL 5 26 6 10   HEMOGLOBIN g/dL 9 3* 10 6*   HEMATOCRIT % 29 9* 33 7*   MCV fL 67* 68*   PLATELETS Thousands/uL  --  172   MCH pg 20 9* 21 3*   MCHC g/dL 31 1* 31 5   RDW % 19 0* 18 9*   NRBC AUTO /100 WBCs  --  0     CMP  Results from last 7 days  Lab Units 04/17/18  0447 04/16/18  1326   SODIUM mmol/L 139 138   POTASSIUM mmol/L 3 8 4 5   CHLORIDE mmol/L 107 103   CO2 mmol/L 23 24   ANION GAP mmol/L 9 11   BUN mg/dL 36* 47*   CREATININE mg/dL 1 07 1 48*   GLUCOSE RANDOM mg/dL 80 105   CALCIUM mg/dL 6 6* 7 1   AST U/L 38 48*   ALT U/L 21 28   ALK PHOS U/L 60 73   TOTAL PROTEIN g/dL 6 4 7 4   BILIRUBIN TOTAL mg/dL 0 50 0 36   EGFR ml/min/1 73sq m 49 33      BMP:  Results from last 7 days  Lab Units 04/17/18  0447 04/16/18  1326   SODIUM mmol/L 139 138   POTASSIUM mmol/L 3 8 4 5   CHLORIDE mmol/L 107 103   CO2 mmol/L 23 24   BUN mg/dL 36* 47*   CREATININE mg/dL 1 07 1 48*   GLUCOSE RANDOM mg/dL 80 105   CALCIUM mg/dL 6 6* 7 1      Results from last 7 days  Lab Units 04/16/18  1326   INR   3 14*           Lab Results   Component Value Date     NTBNP 12,681 (H) 03/15/2018     Results from last 7 days  Lab Units 04/16/18  1326   TROPONIN I ng/mL 0 06*       CT HEAD -  No acute intracranial abnormality  Age related atrophy and findings most compatible with chronic small vessel ischemic disease      Past Medical/Surgical History:    Active Ambulatory Problems     Diagnosis Date Noted    Essential hypertension 12/19/2017    CAD (coronary artery disease) 12/19/2017    Type 2 diabetes mellitus with complication, with long-term current use of insulin ([facility] ) 12/19/2017    Atrial flutter ([facility] ) 12/19/2017    Hyperlipidemia 12/19/2017    Gastroesophageal reflux disease without esophagitis 12/19/2017    Moderate mitral stenosis 12/19/2017    LEONARD (acute kidney injury) ([address] ) 12/19/2017    Arthritis of hand 12/26/2014    Asthma 03/16/2018    Asymptomatic carotid artery stenosis, bilateral 11/16/2017    Atherosclerosis of artery of extremity with ulceration ([facility] ) 06/09/2017    Hx of CABG 01/15/2018    Chronic anticoagulation 08/22/2016    [facility] diastolic congestive [facility] failure [facility] 01/15/2018    Chronic systolic heart failure [facility] 07/20/2016    Degenerative joint disease involving multiple joints 07/28/2017    Diabetic retinopathy ([facility] ) 08/02/2016    Postoperative hypothyroidism 10/16/2013    Migraine headache 10/18/2017    Nephrolithiasis 12/17/2015    Osteoarthritis of both knees 11/23/2015    Persistent atrial fibrillation ([facility] 01/15/2018    Ulcer of toe of left foot ([facility] ) 10/23/2017    Ulcer of toe of right foot ([facility] ) 03/26/2018    Prolonged Q-T interval on ECG 03/28/2018    Acute respiratory insufficiency, postoperative 04/03/2018    Left bundle branch block (LBBB) 04/03/2018    1st degree AV block 04/03/2018    S/P TAVR (transcatheter aortic valve replacement) 04/06/2018     Resolved Ambulatory Problems     Diagnosis Date Noted    NSTEMI (non-ST elevated myocardial infarction) ([facility] ) 12/19/2017    Critical aortic valve stenosis 12/19/2017    Acute combined systolic and diastolic congestive heart failure ([facility] ) 07/05/2016    URI, acute 03/12/2018    Acute cardiogenic pulmonary edema ([facility] ) 03/15/2018    Acute respiratory failure with hypoxia (HCC) 03/16/2018    Atrial fibrillation with rapid ventricular response (HCC)     Acute on chronic diastolic CHF (congestive heart failure) ([facility] ) 03/28/2018   [facility] discharge follow-up 03/28/2018    Hypokalemia 04/03/2018    Hypocalcemia 04/03/2018     Past Medical History:   Diagnosis Date    Altered mental status     Anticoagulated     Asthma     Atrial flutter ([facility] )     CAD (coronary artery disease)     Candidiasis     Carotid stenosis, bilateral     Cataract     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) (HCC)     Chronic fatigue syndrome     Chronic low back pain     Concussion w loss of consciousness of unsp duration, init     Coronary artery disease     Diabetes mellitus ([facility]     DJD (degenerative joint disease)     GERD (gastroesophageal reflux disease)     Herpes zoster     HLD (hyperlipidemia)     HTN (hypertension)     Hyperlipidemia     Hypothyroidism     Irritable bowel syndrome     Lumbar radiculopathy     Lyme disease     Lyme disease     MI (myocardial infarction) ([facility] )     Migraines     Muscle spasm     Non-neoplastic nevus     Nontoxic multinodular goiter     OA (osteoarthritis)     Osteoarthritis     Other chronic pain     [facility]     Palpitations     Pseudogout     Spinal stenosis     Transient cerebral ischemia     Trigger ring finger     Viral gastroenteritis        Admitting Diagnosis: TIA (transient ischemic attack) [G45 9]  Confusion [R41 0]  Elevated troponin [R74 8]  Expressive aphasia [R47 01]  Acute kidney injury (HCC) [N17 9]  S/P TAVR (transcatheter aortic valve replacement) [Z95 2]    Age/Sex: [de-identified] y o  female      Assessment and Plan      Expressive Aphasia  -Differential remains broad, including decreased perfusion due to volume contraction, hypoglycemia, TIA/CVA  -Patient is not below therapeutic range of INR   -Neuro checks Q4H  -Will not use stroke pathway due to subacute presentation  -Will consult neurology      Elevated troponin  -Significance unclear given recent cardiac procedure and tachycardia with known CAD  -Clinically stable    -Monitor with telemetry      Acute kidney injury  -Likely prerenal with volume contraction on exam   -Start normal saline, 100cc/hr   -Hold ACEI  -Trend Cr      Chronic heart failure with AS s/p TAVR  -Patient on 40mg BID furosemide at home  -Appears volume contracted on exam, likely iatrogenic  -Hold furosemide, start NS at 100cc/hr with anticipated 1L resuscitation overnight  -Cerebral hypoperfusion could cause symptoms, as above   -Consult cardiology      IDDM2  -Diabetic diet   -On 30u BID basal insulin with fair control at home at 7 5%  -Use 25u BID basal insulin with QID fingerstick  -Hold metformin with LEONARD  -Goal blood sugar < 180 in hospital      CAD s/p CABG  -Continue ASA, high intensity statin     Atrial fibrillation/flutter  -Continue rate control strategy and anticoagulation with coumadin      Asthma  -Continue inhaled steroid  -beta agonist therapy PRN        Code Status: Level 1 - Full Code  VTE Pharmacologic Prophylaxis: Anticoagulated on Warfarin   VTE Mechanical Prophylaxis: sequential compression device    Admission Status: INPATIENT          Admission Orders:  4/16/18 AT 1637   ADMIT INPATIENT  TELEMETRY  VS + NEURO CHECKS Q4HRS    Up + OOB as Tolerated  SCD    Diet Cardiac; Cardiac TLC 2 3 GM NA    Continuous IV Infusions:    IVF sodium chloride 0 9 % infusion at 100 cc/hr      Scheduled Meds:   Current Facility-Administered Medications:  acetaminophen 650 mg Oral Q6H PRN Benjamin Fowler   aspirin 81 mg Oral Daily Brian Branch   atorvastatin 80 mg Oral Daily Brian Branch   cholecalciferol 1,000 Units Oral Daily Brian Branch   docusate sodium 100 mg Oral BID PRN Apryl Denver Branch   fluticasone 1 puff Inhalation Q12H [facility] Benjamin Tip   [START ON 4/18/2018] insulin glargine 20 Units Subcutaneous Daily With Breakfast Brian Branch   insulin glargine 20 Units Subcutaneous HS Brian Branch   levothyroxine 100 mcg Oral Early Morning Brian Branch   metoprolol tartrate 100 mg Oral Q12H [facility] Brian Branch   warfarin 3 mg Oral Daily (warfarin) Benjamin Fowler       PRN Meds:     acetaminophen    docusate sodium    CONSULT CARD    CONSULT NEURO    PT EVAL    OT EVAL         Cardiology  Consults Date of Service: 4/17/2018 11:10 AM     Assessment/ Plan:     CVA; patient presented with aphasia for several days  She has a known history of paroxysmal atrial fibrillation/flutter  She is chronically anticoagulated with warfarin     Reviewed prior blood INR; she has been subtherapeutic on multiple occasions since mid March  Her INR on discharge on 4/6 was 1 37; I called [facility] and INR on 4/9 was 2 1 and again on 4/11 was 2 4  Her INR yesterday was 3 14  Continue Warfarin for goal 2-3; if patients Coumadin needs to be held in future she should be bridged with IV heparin       Paroxysmal atrial fibrillation/flutter:  Patient appears to be maintaining sinus rhythm, continue home dose metoprolol 100 mg b i d   Anticoagulation as above     CAD with prior CABG x4:  Done at [facility]  Recent preoperative cardiac catheterization showed significant triple-vessel disease with patent LIMA to LAD, patent vein graft to D1-to-3/sequential; occluded vein graft to OM 1 and vein graft to PDA  Continue aspirin, statin and beta-blocker  No anginal symptoms, trivial elevation in troponin to 0 06  Likely due to her current CVA and recent cardiac valve surgery      History of severe aortic stenosis:  Status post TAVR on 4/3/2018  Postoperative echo 4/4 showed bioprosthetic valve well seated with normal function  She has no signs or symptoms of heart failure on exam     Mitral valve disease: Moderate mitral stenosis and mild mitral regurgitation  Follow-up outpatient     Mixed dyslipidemia:  Continue statin     History of systolic heart failure:  Postoperative EF low normal at 50%  She takes Lasix 40 mg b i d  as outpatient, her creatinine was slightly elevated above baseline admission which improved with intravenous fluids  She appears well-compensated and euvolemic on exam   Stop intravenous fluids today, resume Lasix likely tomorrow; she likely will need daily dosing as she has now had her aortic valve replaced         Neurology  Consults Date of Service: 4/17/2018  7:14 AM     Assessment:  17-year-old female with past medical history of recent aortic valve replacement for aortic stenosis, atrial fibrillation/AF flutter anticoagulated with Coumadin, hypertension, hyperlipidemia, diabetes, CHF, PAD, presenting with several days duration of dysarthric speech and word-finding difficulty  MRI brain as documented below demonstrates bihemispheric multi vascular territory punctate infarctions with concern for central embolic source      Plan:  1  Stroke pathway: MRI brain revealing several embolic appearing infarctions (multiple vascular distributions/bihemispheric)              -Will obtain carotid dopplers, No CTA head/neck due to documented allergy to iodine (flash pulmonary edema documented)              -Recent Echo performed on 4/4/18, no need to repeat (EF 50%, moderate atrial dilation bilaterally              -Will recommend ROBB to evaluate for intracardiac thrombus given new embolic infarctions and recent TAVR  2   Likely will anticipate transition to NOAC (likely Eliquis) given stroke on supratherapeutic Coumadin  Continuing aspirin with cardiac history and high dose statin  3   Recommend check hemoglobin A1c and lipid panel  4   Telemetry monitoring  5   Frequent neuro checks  6   Stroke education provided  7   PT/OT evaluations    8   Discussed plan of care with attending neurologist   Will continue to monitor neurologic status throughout workup

## 2018-04-17 NOTE — TREATMENT PLAN
Discussed with Neurology; they would like to check ROBB to eval for thrombus  Discussed with patient, she is agreeable to proceed  Procedure and risks reviewed; formal consent to be obtained by performing physician  NPO after MN 
Unknown

## 2018-04-17 NOTE — PLAN OF CARE
Problem: OCCUPATIONAL THERAPY ADULT  Goal: Performs self-care activities at highest level of function for planned discharge setting  See evaluation for individualized goals  Treatment Interventions: ADL retraining, Functional transfer training, Endurance training, Patient/family training, Equipment evaluation/education, Compensatory technique education, Continued evaluation, Energy conservation (cog assess/tx)          See flowsheet documentation for full assessment, interventions and recommendations  Limitation: Decreased ADL status, Decreased Safe judgement during ADL, Decreased cognition, Decreased endurance, Decreased self-care trans, Decreased high-level ADLs  Prognosis: Good, Fair  Assessment: Pt  is a [de-identified] y o  female who was admitted to [facility] on 4/16/2018 w/ expressive aphasia  MRI revealed several small likely embolic regions  Pt  w/a significant PMH of asthma, CAD, carotid artery stenosis, CHF, , DJD, GERD, HLD, lumbar radiculopathy, MI hx, OA, Hx  Of CABG, Hx  Of TAVR (4/2018)  Pt  currently lives in a [facility] with FF s/u alone  PTA, pt  With recent STR stay at [facility]  Pt  Left rehab and was home for ~48 hours before returning to [facility]  Pt  Reporting that s/p STR stay, pt  (I) in all ADLs/IADLs  Pt reporting that she did not use AD for functional mobility however the therapists at [facility] were rec  RW   Currently, pt  requires min A-(S) for functional mobility and transfers, min A-(S) for UB ADLs and min A-(S) for LB ADLs  A/S is needed d/t the following impairments: decreased functional activity tolerance, generalized deconditioning, decreased balance,  increased impulsivity, increased distractibility, decreased STM, poor safety/insight/judgment and awareness into deficits  Additionally, pt  requires min v c  during functional activity 2* to cognitive deficits noted  For example, pt  Forgetting to pull up undergarment s/p toileting requiring cues  Pt  Also required v c  For RW use as pt   With multiple attempts to mobilize without  Therefore, pt  will benefit from skilled OT services to maximize functional gains, monitor status and prevent decline  Will continue to follow pt  3-5x/week to meet the goals described below in 7-10 days  Recommend STR vs  Home with ?able need for increased S/A (rec  Further cog assess if pt  To d c  Home) once medically stable       OT Discharge Recommendation: Short Term Rehab (vs Home with ?able need for increased S/A)  OT - OK to Discharge: Yes (to STR at this time)

## 2018-04-17 NOTE — PHYSICAL THERAPY NOTE
Physical Therapy Evaluation    Patient's Name: Adam Woodson    Admitting Diagnosis  TIA (transient ischemic attack) [G45 9]  Confusion [R41 0]  Elevated troponin [R74 8]  Expressive aphasia [R47 01]  Acute kidney injury ([facility] ) [N17 9]  S/P TAVR (transcatheter aortic valve replacement) [Z95 2]    Problem List  Patient Active Problem List   Diagnosis    Essential hypertension    CAD (coronary artery disease)    Type 2 diabetes mellitus with complication, with long-term current use of insulin ([facility])    Atrial flutter (HCC)    Hyperlipidemia    Gastroesophageal reflux disease without esophagitis    Moderate mitral stenosis    [facility]ONARD (acute kidney injury) ([facility] )    Arthritis of hand    Asthma    Asymptomatic carotid artery stenosis, bilateral    Atherosclerosis of artery of extremity with ulceration ([facility] )    Hx of CABG    Chronic anticoagulation    Chronic diastolic congestive heart failure (HCC)    Chronic systolic heart failure (HCC)    Degenerative joint disease involving multiple joints    Diabetic retinopathy (HCC)    Postoperative hypothyroidism    Migraine headache    Nephrolithiasis    Osteoarthritis of both knees    Persistent atrial fibrillation (HCC)    Ulcer of toe of left foot (HCC)    Ulcer of toe of right foot (HCC)    Prolonged Q-T interval on ECG    Acute respiratory insufficiency, postoperative    Left bundle branch block (LBBB)    1st degree AV block    S/P TAVR (transcatheter aortic valve replacement)    Expressive aphasia    CVA (cerebral vascular accident) ([facility] )       Past Medical History  Past Medical History:   Diagnosis Date    Altered mental status     Anticoagulated     Coumadin for Aflutter    Asthma     Atrial flutter ([facility] )     maintained on coumadin anticoag    CAD (coronary artery disease)     Candidiasis     Carotid stenosis, bilateral     Cataract     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) ([facility])     Chronic fatigue syndrome     Chronic low back pain     Concussion w loss of consciousness of unsp duration, init     Coronary artery disease     Diabetes mellitus ([facility] )     type 2, insulin dependent    DJD (degenerative joint disease)     GERD (gastroesophageal reflux disease)     Herpes zoster     HLD (hyperlipidemia)     HTN (hypertension)     Hyperlipidemia     Hypothyroidism     Irritable bowel syndrome     Lumbar radiculopathy     Lyme disease     Dx in hospital 8/2011    Lyme disease     MI (myocardial infarction) ([facility] )     Migraines     Muscle spasm     Non-neoplastic nevus     Nontoxic multinodular goiter     OA (osteoarthritis)     Osteoarthritis     Other chronic pain     PAD [facility]peripheral artery [facility]) [facility])     Palpitations     Pseudogout     Spinal stenosis     Transient cerebral ischemia     Trigger ring finger     Viral gastroenteritis        Past Surgical History  Past Surgical History:   Procedure Laterality Date    APPENDECTOMY      ARTERIOGRAM  12/19/2017    CARDIAC CATHETERIZATION      CHOLECYSTECTOMY      COLONOSCOPY      CORONARY ARTERY BYPASS GRAFT  2004    LUMBAR LAMINECTOMY      CO ECHO TRANSESOPHAG R-T 2D W/PRB IMG ACQUISJ I&R N/A 4/3/2018    Procedure: TRANSESOPHAGEAL ECHOCARDIOGRAM (ROBB);   Surgeon: Dino Aase, DO;  [facility];  Service: Cardiac Surgery    CO REPLACE AORTIC VALVE OPENFEMORAL ARTERY APPROACH N/A 4/3/2018    Procedure: REPLACEMENT AORTIC VALVE TRANSCATHETER (TAVR) TRANSFEMORAL w/ 26MM IVY YEVGENIY S3 VALVE;  Surgeon: Dino Aase, DO;  [facility];  Service: Cardiac Surgery    THYROIDECTOMY      TOTAL ABDOMINAL HYSTERECTOMY W/ BILATERAL SALPINGOOPHORECTOMY          04/17/18 1525   Note Type   Note type Eval only   Pain Assessment   Pain Assessment 0-10   Pain Score 3   Pain Type Acute pain   Pain Location Generalized   Patient's Stated Pain Goal No pain   Hospital Pain Intervention(s) Ambulation/increased activity Response to Interventions unchanged   Home Living   Type of Home House   Additional Comments Resides alone in 2 story home w/ ability to have first floor setup  Indep self care, ambulates w/ out device but was advised to use RW on recent rehab d/c   Prior Function   Level of [place] Independent with ADLs and functional mobility   Lives With Alone   Receives Help From (has daily checks from family, as well as home nursing)   Falls in the last 6 months 0  (denies)   Restrictions/Precautions   Weight Bearing Precautions Per Order No   Other Precautions Cognitive; Chair Alarm; Bed Alarm;Multiple lines; Fall Risk   General   Family/Caregiver Present No   Cognition   Overall Cognitive Status Impaired   Arousal/Participation Responsive   Attention Attends with cues to redirect   Orientation Level Oriented X4   Memory Unable to assess   Following Commands Follows one step commands without difficulty   Comments continued word finding issues, but inconsistant    impulsive, but cooperative   RLE Assessment   RLE Assessment (strength 4/5)   LLE Assessment   LLE Assessment (strength 4/5)   Coordination   Movements are Fluid and Coordinated 0   Coordination and Movement Description B LE ataxia   Bed Mobility   Supine to Sit 5  Supervision   Additional items Assist x 1   Transfers   Sit to Stand 5  Supervision   Additional items Assist x 1   Stand to Sit 5  Supervision   Additional items Assist x 1   Ambulation/Elevation   Gait pattern (mild ataxia, wide MEGAN)   Gait Assistance 4  Minimal assist   Additional items Assist x 1   Assistive Device (refused to use RW- enforced p session pt needs RW)   Distance 100'   Balance   Static Sitting Good   Dynamic Sitting Fair +   Static Standing Fair   Dynamic Standing Fair -   Ambulatory Fair -   Endurance Deficit   Endurance Deficit Yes   Endurance Deficit Description fatigue, weakness, impulsivity   Activity Tolerance   Activity Tolerance Patient limited by fatigue;Treatment limited secondary to medical complications (Comment)   Nurse Made Aware yes   Assessment   Prognosis Good   Problem List Decreased strength;Decreased endurance; Impaired balance;Decreased mobility; Decreased coordination; Impaired judgement;Decreased cognition;Decreased safety awareness   Assessment Pt seen for high complexity physical therapy evaluation  Pt is an [de-identified] y/o female recently d/c home from rehab and w/ history/comorbidities of IDDM, AS w/ recent TAVR, HTN, MI, CAD, B internal carotid stenosis, asthma, CAD, Lyme dz, LBP, DJD, GERD who is now admitted w/ several day hx of expressive [place]  MRI brain showed 4 punctate foci of acute/recent lacunar infarcts in 4 distinct vascular territories  Due to acute medical issues w/ acute CVAs, fall risk, impulsivity, note unstable clinical picture  PT consulted to assess mobility, d/c needs  Pt presents w/ decreased functional mob, standing balance, endurance, B LE strength, barriers at home, impulsivity w/ mobility tasks  will benefit from skilled PT to correct for the above problems  Provided pt makes quick progress, and agrees to use RW, anticipate d/c home w/ home therapies  Per OT, would prefer increased S at home by family, and hopefully they can provide same  will follow for progress and d/c needs   Goals   Patient Goals none stated   STG Expiration Date 05/01/18   Short Term Goal #1 1-2 wks: bed mob and transfers w/ indep, standing balance to good/normal w/ device, ambulate 200-300 ft w/ RW and mod I, increase B LE strength by 1/2 -1 grade, ambulate several JUSTINO w/ S   Treatment Day 0   Plan   Treatment/Interventions Functional transfer training;LE strengthening/ROM; Elevations; Therapeutic exercise; Endurance training;Patient/family training;Equipment eval/education; Bed mobility;Gait training   PT Frequency 5x/wk   Recommendation   Recommendation (see above for recs)   Equipment Recommended Walker  (needs to use at all times)   PT - OK to Discharge Yes  (when stable- see above for recs)   Modified Rae Scale   Modified [place] Scale 4   Barthel Index   Feeding 10   Bathing 0   Grooming Score 5   Dressing Score 5   Bladder Score 10   Bowels Score 10   Toilet Use Score 5   Transfers (Bed/Chair) Score 10   Mobility (Level Surface) Score 0   Stairs Score 0   Barthel Index Score 55         Ayesha Orellana PT, DPT, CSRS

## 2018-04-17 NOTE — SOCIAL WORK
CM met with pt and pt's son Marquez Salcedo at bedside and explained CM role  Pt lives alone in a two story home with 5 steps to enter  Pt's home has a first floor set up for bedroom/bathroom  PTA pt was independent with ADL's and did not use DME for ambulation  Pt owns a walker, but reports that she does not use it  PT has a hx of VNA services through [facility], who she is still open with  Pt also has a hx of STR through [facility]  Pt requests to resume these services post d/c  Pt is able to drive, and will have assistance with transportation post d/c from her sons  Pt uses [facility] in [place] and sees PCP Dr Fred Turner  Pt does not have a POA  Pt reports no hx of d/a rehabilitation or mental health diagnoses  Pt confirms that her primary contacts are her two sons Paradise Person [phone] and Angi Spine [phone]  CM reviewed d/c planning process including the following: identifying help at home, patient preference for d/c planning needs, Discharge Lounge, [facility] Meds to Bed program, availability of treatment team to discuss questions or concerns patient and/or family may have regarding understanding medications and recognizing signs and symptoms once discharged  CM also encouraged patient to follow up with all recommended appointments after discharge  Patient advised of importance for patient and family to participate in managing patients medical well being  Patient/caregiver received discharge checklist   Content reviewed  Patient/caregiver encouraged to participate in discharge plan of care prior to discharge home      -Reviewed and agreed with above documentation Tony MAGALLANES

## 2018-04-17 NOTE — OCCUPATIONAL THERAPY NOTE
[address] Evaluation     Patient Name: Curtis Matos  IAFYA'D Date: 4/17/2018  Problem List  Patient Active Problem List   Diagnosis    Essential hypertension    CAD (coronary artery disease)    Type 2 diabetes mellitus with complication, with long-term current use of insulin (HCC)    Atrial flutter (HCC)    Hyperlipidemia    Gastroesophageal reflux disease without esophagitis    Moderate mitral stenosis    LEONARD [facility]) [facility] )    Arthritis of hand    Asthma    Asymptomatic carotid artery stenosis, bilateral    Atherosclerosis of artery of extremity with ulceration ([facility] )    Hx of CABG    Chronic anticoagulation    Chronic diastolic congestive heart failure (HCC)    Chronic systolic heart failure (HCC)    Degenerative joint disease involving multiple joints    Diabetic retinopathy (HCC)    Postoperative hypothyroidism    Migraine headache    Nephrolithiasis    Osteoarthritis of both knees    Persistent atrial fibrillation (HCC)    Ulcer of toe of left foot (HCC)    Ulcer of toe of right foot (HCC)    Prolonged Q-T interval on ECG    Acute respiratory insufficiency, postoperative    Left bundle branch block (LBBB)    1st degree AV block    S/P TAVR (transcatheter aortic valve replacement)    Expressive aphasia    CVA (cerebral vascular accident) ([facility] )     Past Medical History  Past Medical History:   Diagnosis Date    Altered mental status     Anticoagulated     Coumadin for Aflutter    Asthma     Atrial flutter (HCC)     maintained on coumadin anticoag    CAD (coronary artery disease)     Candidiasis     Carotid stenosis, bilateral     Cataract     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) ([facility])     Chronic fatigue syndrome     Chronic low back pain     Concussion w loss of consciousness of unsp duration, init     Coronary artery disease     Diabetes mellitus ([facility] )     type 2, insulin dependent    DJD (degenerative joint disease)     GERD (gastroesophageal reflux disease)     Herpes zoster     HLD (hyperlipidemia)     HTN (hypertension)     Hyperlipidemia     Hypothyroidism     Irritable bowel syndrome     Lumbar radiculopathy     Lyme disease     Dx in hospital 8/2011    Lyme disease     MI [facility]myocardial infarction) [facility]     Migraines     Muscle spasm     Non-neoplastic nevus     Nontoxic multinodular goiter     OA (osteoarthritis)     Osteoarthritis     Other chronic pain     PAD (peripheral artery disease) (HCC)     Palpitations     Pseudogout     Spinal stenosis     Transient cerebral ischemia     Trigger ring finger     Viral gastroenteritis      Past Surgical History  Past Surgical History:   Procedure Laterality Date    APPENDECTOMY      ARTERIOGRAM  12/19/2017    CARDIAC CATHETERIZATION      CHOLECYSTECTOMY      COLONOSCOPY      CORONARY ARTERY BYPASS GRAFT  2004    LUMBAR LAMINECTOMY      TN ECHO TRANSESOPHAG R-T 2D W/PRB IMG ACQUISJ I&R N/A 4/3/2018    Procedure: TRANSESOPHAGEAL ECHOCARDIOGRAM (ROBB); Surgeon: Iliana Anderson DO;  [facility];  Service: Cardiac Surgery    TN REPLACE AORTIC VALVE OPENFEMORAL ARTERY APPROACH N/A 4/3/2018    Procedure: REPLACEMENT AORTIC VALVE TRANSCATHETER (TAVR) TRANSFEMORAL w/ 26MM IVY YEVGENIY S3 VALVE;  Surgeon: Iliana Anderson DO;  [facility];  Service: Cardiac Surgery    THYROIDECTOMY      TOTAL ABDOMINAL HYSTERECTOMY W/ BILATERAL SALPINGOOPHORECTOMY           04/17/18 1454   Note Type   Note type Eval/Treat   Restrictions/Precautions   Weight Bearing Precautions Per Order No   Other Precautions Cognitive; Bed Alarm; Fall Risk;Telemetry   Pain Assessment   Pain Assessment No/denies pain   Pain Score No Pain   Home Living   Type of Home House   Home Layout Two level; Able to live on main level with bedroom/bathroom   Bathroom Shower/Tub Tub/shower unit   Bathroom Toilet Standard   Bathroom Equipment Grab bars in shower; Shower chair   [address]  (did not use)   Prior Function   Level of [place] Independent with ADLs and functional mobility   Lives With Alone   Receives Help From Family  (family completes daily checks)   ADL Assistance Independent   IADLs Independent   Falls in the last 6 months 0   Vocational Retired   Lifestyle   Autonomy I in ADL/IADL   Reciprocal Relationships supportive children, preform daily checks   Service to Others retired   [address] enjoys her kelli   Psychosocial   Psychosocial (WDL) WDL   Subjective   Subjective "They told me to use a walker so I said OK to soothe them but I don't really use it"   ADL   Grooming Assistance 5  Supervision/Setup   Grooming Deficit Supervision/safety;Verbal cueing;Standing with assistive device; Wash/dry hands   UB Dressing Assistance 4  Minimal Assistance   UB Dressing Deficit Fasteners   LB Dressing Assistance 5  Supervision/Setup   LB Dressing Deficit Setup;Supervision/safety; Don/doff R sock; Don/doff L sock   Toileting Assistance  4  Minimal Assistance   Toileting Deficit Setup;Verbal cueing;Supervison/safety; Increased time to complete;Steadying   Bed Mobility   Supine to Sit 5  Supervision   Additional items HOB elevated   Transfers   Sit to Stand 5  Supervision   Additional items Impulsive;Verbal cues   Stand to Sit 5  Supervision   Additional items Verbal cues; Impulsive   Toilet transfer 4  Minimal assistance   Additional items Verbal cues;Standard toilet; Impulsive   Functional Mobility   Functional Mobility 4  Minimal assistance   Additional Comments min A without AD, (S) with RW with mod v c  for safe use   Balance   Static Sitting Good   Dynamic Sitting Fair +   Static Standing Fair   Dynamic Standing Fair -   Ambulatory Fair -   Activity Tolerance   Activity Tolerance Patient tolerated treatment well   Nurse Made Aware yes, TOMY WRIGHT  cleared for session   RUE Assessment   RUE Assessment WFL  (functionally assessed)   LUE Assessment   LUE Assessment WFL  (functionally assessed)   Hand Function   Gross Motor Coordination Functional   Fine Motor Coordination Functional   Sensation   Light Touch No apparent deficits   Cognition   Overall Cognitive Status Impaired   Arousal/Participation Alert; Cooperative   Attention Attends with cues to redirect   Orientation Level Oriented X4   Memory Decreased short term memory   Following Commands Follows one step commands without difficulty   Assessment   Limitation Decreased ADL status; Decreased Safe judgement during ADL;Decreased cognition;Decreased endurance;Decreased self-care trans;Decreased high-level ADLs   Prognosis Good;Fair   Assessment Pt  is a [de-identified] y o  female who was admitted to [facility] on 4/16/2018 w/ expressive aphasia  MRI revealed several small likely embolic regions  Pt  w/a significant PMH of asthma, CAD, carotid artery stenosis, CHF, , DJD, GERD, HLD, lumbar radiculopathy, MI hx, OA, Hx  Of CABG, Hx  Of TAVR (4/2018)  Pt  currently lives in a [facility] with FF s/u alone  PTA, pt  With recent [facility] stay at [facility]  Pt  Left rehab and was home for ~48 hours before returning to [facility]  Pt  Reporting that s/p STR stay, pt  (I) in all ADLs/IADLs  Pt reporting that she did not use AD for functional mobility however the therapists at [address] [place] [address] were rec  RW   Currently, pt  requires min A-(S) for functional mobility and transfers, min A-(S) for UB ADLs and min A-(S) for LB ADLs  A/S is needed d/t the following impairments: decreased functional activity tolerance, generalized deconditioning, decreased balance,  increased impulsivity, increased distractibility, decreased STM, poor safety/insight/judgment and awareness into deficits  Additionally, pt  requires min v c  during functional activity 2* to cognitive deficits noted  For example, pt  Forgetting to pull up undergarment s/p toileting requiring cues  Pt  Also required v c  For RW use as pt   With multiple attempts to mobilize without  Therefore, pt  will benefit from skilled OT services to maximize functional gains, monitor status and prevent decline  Will continue to follow pt  3-5x/week to meet the goals described below in 7-10 days  Recommend STR vs  Home with ?able need for increased S/A (rec  Further cog assess if pt  To d c  Home) once medically stable  Goals   Patient Goals to go home ASAP   LTG Time Frame 7-10   Plan   Treatment Interventions ADL retraining;Functional transfer training; Endurance training;Patient/family training;Equipment evaluation/education; Compensatory technique education;Continued evaluation; Energy conservation  (cog assess/tx)   Goal Expiration Date 04/27/18   OT Frequency 3-5x/wk   Recommendation   OT Discharge Recommendation Short Term Rehab  (vs Home with ?able need for increased S/A)   OT - OK to Discharge Yes  (to STR at this time)   Barthel Index   Feeding 10   Bathing 0   Grooming Score 5   Dressing Score 5   Bladder Score 10   Bowels Score 10   Toilet Use Score 5   Transfers (Bed/Chair) Score 10   Mobility (Level Surface) Score 0   Stairs Score 0   Barthel Index Score 55   Modified [place] Scale   Modified Rae Scale 3     GOALS  Patient will perform all functional mobility and transfers at a mod I level with use of the least restrictive device  Pt  will perform bed mobility at a mod I level with G balance and activity tolerance  Pt  will complete LB/UB dressing at a mod I level with AE as needed  Pt  will complete all grooming activities at a mod I level  Pt  will complete a toileting tasks at a mod I level  Pt will complete LB/UB bathing at a mod I level with the use of AE as needed  Patient will participate in 20 minutes of functional activity without any overt signs of fatigue or abnormal vitals to demonstrate G endurance as it is required for ADLs/functional activity      Pt  will utilize ECT without v c  T/o session to demonstrate increased recall and understanding and improve safety during functional activity  Pt will engage in ongoing cognitive assessment w/ G participation to A w/ safe d/c planning/recommendation       GENEVA Mead, OTR/L

## 2018-04-17 NOTE — RESTORATIVE TECHNICIAN NOTE
Restorative Specialist Mobility Note       Activity: Ambulate in morrison, Ambulate in room, Bathroom privileges, Chair, Stand at bedside, Dangle (Educated/encouraged pt to ambulate with assistance 3-4 x's/day  Bed alarm on   Pt callbell, phone/tray within reach )     Assistive Device: Front wheel walker       Pradeep PERALTA, Restorative Technician, [employer]

## 2018-04-17 NOTE — PROGRESS NOTES
04/17/18 [address] Involvement Patient active with [employer]   Adventism Leader Aware/Contacted Leader/[employer] aware of patient's status   Spiritual Beliefs/Perceptions   Support Systems Children;Adventism/kelli community   Stress Factors   Patient Stress Factors Health changes   Coping Responses   Patient Coping Open/discussion; Accepting   Plan of Care   Comments Cultivated a relationship of care and support, reviewed information from treatment team, explored relational and spiritual needs and resources, facilitated story telling, evaluated appropriate coping strategies, listened empathically, pt  expressed humor and gratitude, pt  verbally expressed emotions      Assessment Completed by: Unit visit

## 2018-04-17 NOTE — PLAN OF CARE
Problem: PHYSICAL THERAPY ADULT  Goal: Performs mobility at highest level of function for planned discharge setting  See evaluation for individualized goals  Treatment/Interventions: Functional transfer training, LE strengthening/ROM, Elevations, Therapeutic exercise, Endurance training, Patient/family training, Equipment eval/education, Bed mobility, Gait training  Equipment Recommended: [place] Boot (needs to use at all times)       See flowsheet documentation for full assessment, interventions and recommendations  Prognosis: Good  Problem List: Decreased strength, Decreased endurance, Impaired balance, Decreased mobility, Decreased coordination, Impaired judgement, Decreased cognition, Decreased safety awareness  Assessment: Pt seen for high complexity physical therapy evaluation  Pt is an [de-identified] y/o female recently d/c home from rehab and w/ history/comorbidities of IDDM, AS w/ recent TAVR, HTN, MI, CAD, B internal carotid stenosis, asthma, CAD, Lyme dz, LBP, DJD, GERD who is now admitted w/ several day hx of expressive [place]  MRI brain showed 4 punctate foci of acute/recent lacunar infarcts in 4 distinct vascular territories  Due to acute medical issues w/ acute CVAs, fall risk, impulsivity, note unstable clinical picture  PT consulted to assess mobility, d/c needs  Pt presents w/ decreased functional mob, standing balance, endurance, B LE strength, barriers at home, impulsivity w/ mobility tasks  will benefit from skilled PT to correct for the above problems  Provided pt makes quick progress, and agrees to use RW, anticipate d/c home w/ home therapies  Per OT, would prefer increased S at home by family, and hopefully they can provide same  will follow for progress and d/c needs        Recommendation:  (see above for recs)     PT - OK to Discharge: (S) Yes (when stable- see above for recs)    See flowsheet documentation for full assessment

## 2018-04-17 NOTE — PLAN OF CARE
Problem: DISCHARGE PLANNING - CARE MANAGEMENT  Goal: Discharge to post-acute care or home with appropriate resources  INTERVENTIONS:  - Conduct assessment to determine patient/family and health care team treatment goals, and need for post-acute services based on payer coverage, community resources, and patient preferences, and barriers to discharge  - Address psychosocial, clinical, and financial barriers to discharge as identified in assessment in conjunction with the patient/family and health care team  - Arrange appropriate level of post-acute services according to patient's   needs and preference and payer coverage in collaboration with the physician and health care team  - Communicate with and update the patient/family, physician, and health care team regarding progress on the discharge plan  - Arrange appropriate transportation to post-acute venues  -Pt to d/c with appropriate resources when medically ready  Outcome: Progressing

## 2018-04-17 NOTE — CONSULTS
Consultation - Cardiology Team One  Freya Nichols [de-identified] y o  female MRN: [id number]  [facility] [address] Encounter: [id number]    Consults    Physician Requesting Consult: Terrance Anaya MD     Reason for Consult / Principal Problem: s/p TAVR    History of Present Illness      HPI: Freya Nichols is a [de-identified]y o  year old female who has a history of severe AS s/p TAVR, CAD, IDDM, b/l carotid stenosis  She follows with cardiologist Dr Wanda Massey  Pt presented to ED on 4/16 with concerns of expressive aphasia for several days prior to admission  Pt was recently discharged on 4/6 after undergoing TAVR on 4/3/2018  Post op course complicated by paroxysmal atrial fib/flutter which she had a known hx of  She has been anticoagulated with warfarin; INR on 4/6 was 1 37  She was discharged to [facility] for rehab and stayed there until 4/15  Contact revealed inability to confirm that her INR on 04/09 was 2 1 and 4/11 was 2 4  Yesterday her family noted that she was slow to respond and that she had difficulty identifying objects  No other deficits noted  Pt reports symptoms present for a few days  Notes difficulty finding the words she is thinking of  She denies any weakness, HA, or visual changes  No prior hx of CVA  CT head was negative  An MRI was done last night that showed "4 punctate foci of diffusion restriction indicative of scattered tiny acute/recent lacunar infarctions in 4 distinct vascular territories implicating a central embolic source"  A neurology consultation is requested  Pt feels well from cardiac standpoint  She denies any chest pain, palpitations or SOB  No weight gain or LE edema  EKG on admission showed sinus tachycardia with long first-degree AV block and previously seen left bundle branch block     Post-op echo on 4/4/2018 showed low normal LV systolic function with LVEF 50%; moderate LVH; dilated LA, dilated RA, moderate MS with mild regurgitation; transcatheter bioprosthetic aortic valve was well-seated without stenosis or regurgitation  Review of Systems   Constitution: Negative for decreased appetite, fever, weakness, malaise/fatigue and weight gain  Cardiovascular: Negative for chest pain, dyspnea on exertion, leg swelling, orthopnea, palpitations and syncope  Respiratory: Negative for cough (+ dry cough), shortness of breath, sleep disturbances due to breathing, sputum production and wheezing  Gastrointestinal: Negative for nausea and vomiting  Genitourinary: Negative for dysuria  Neurological: Negative for dizziness and light-headedness  Aphasia   All other systems reviewed and are negative       Historical Information   Past Medical History:   Diagnosis Date    Altered mental status     Anticoagulated     Coumadin for Aflutter    Asthma     Atrial flutter ([facility])     maintained on coumadin anticoag    CAD (coronary artery disease)     Candidiasis     Carotid stenosis, bilateral     Cataract     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) ([facility])     Chronic fatigue syndrome     Chronic low back pain     Concussion w loss of consciousness of unsp duration, init     Coronary artery disease     Diabetes mellitus ([facility])     type 2, insulin dependent    DJD (degenerative joint disease)     GERD (gastroesophageal reflux disease)     Herpes zoster     HLD (hyperlipidemia)     HTN (hypertension)     Hyperlipidemia     Hypothyroidism     Irritable bowel syndrome     Lumbar radiculopathy     Lyme disease     Dx in hospital 8/2011    Lyme disease     [facility]ocardial infarction) ([facility] [address] [facility]     Migraines     Muscle spasm     Non-neoplastic nevus     Nontoxic multinodular goiter     OA (osteoarthritis)     Osteoarthritis     Other chronic pain     PAD (peripheral artery disease) ([facility])     Palpitations     Pseudogout     Spinal stenosis     Transient cerebral ischemia     Trigger ring finger     Viral gastroenteritis      Past Surgical History:   Procedure Laterality Date    APPENDECTOMY      ARTERIOGRAM  12/19/2017    CARDIAC CATHETERIZATION      CHOLECYSTECTOMY      COLONOSCOPY      CORONARY ARTERY BYPASS GRAFT  2004    LUMBAR LAMINECTOMY      ID ECHO TRANSESOPHAG R-T 2D W/PRB IMG ACQUISJ I&R N/A 4/3/2018    Procedure: TRANSESOPHAGEAL ECHOCARDIOGRAM (ROBB);   Surgeon: Jose Haas DO;  [facility];  Service: Cardiac Surgery    ID REPLACE AORTIC VALVE OPENFEMORAL ARTERY APPROACH N/A 4/3/2018    Procedure: REPLACEMENT AORTIC VALVE TRANSCATHETER (TAVR) TRANSFEMORAL w/ 26MM IVY YEVGENIY S3 VALVE;  Surgeon: Jose Haas DO;  [facility];  Service: Cardiac Surgery    THYROIDECTOMY      TOTAL ABDOMINAL HYSTERECTOMY W/ BILATERAL SALPINGOOPHORECTOMY       History   Alcohol Use No     History   Drug Use No     History   Smoking Status    Never Smoker   Smokeless Tobacco    Never Used     Family History:   Family History   Problem Relation Age of Onset    Cancer Mother     Stroke Mother     Cancer Father     Heart disease Father     Breast cancer Sister     Diabetes Daughter      Passed away January 2017        Meds/Allergies   current meds:   Current Facility-Administered Medications   Medication Dose Route Frequency    acetaminophen (TYLENOL) tablet 650 mg  650 mg Oral Q6H PRN    aspirin chewable tablet 81 mg  81 mg Oral Daily    atorvastatin (LIPITOR) tablet 80 mg  80 mg Oral Daily    cholecalciferol (VITAMIN D3) tablet 1,000 Units  1,000 Units Oral Daily    docusate sodium (COLACE) capsule 100 mg  100 mg Oral BID PRN    fluticasone (FLOVENT HFA) 110 MCG/ACT inhaler 1 puff  1 puff Inhalation Q12H [facility]    [START ON 4/18/2018] insulin glargine (LANTUS) subcutaneous injection 20 Units  20 Units Subcutaneous Daily With Breakfast    insulin glargine (LANTUS) subcutaneous injection 20 Units  20 Units Subcutaneous HS    levothyroxine tablet 100 mcg  100 mcg Oral Early Morning    metoprolol tartrate (LOPRESSOR) tablet 100 mg  100 mg Oral Q12H [facility]    warfarin (COUMADIN) tablet 3 mg  3 mg Oral Daily (warfarin)       sodium chloride 100 mL/hr Last Rate: 100 mL/hr (04/17/18 0857)       Allergies   Allergen Reactions    Other Chest Pain     IVP-listed as "chest pain" in previous chart, patient stated this occurred "a long time ago" but does not remember exactly occurred     Cephalosporins Chest Pain    Contrast [Iodinated Diagnostic Agents] Other (See Comments)     Flash pulm edema    Doxycycline Chest Pain    Levaquin [Levofloxacin] Chest Pain    Ondansetron Chest Pain     Prolonged QT    Toradol [Ketorolac Tromethamine] Chest Pain       Objective   Vitals: Blood pressure 101/61, pulse 82, temperature 98 9 °F (37 2 °C), temperature source Oral, resp  rate 18, height 5' 4" (1 626 m), weight 76 2 kg (167 lb 15 9 oz), SpO2 95 %, not currently breastfeeding ,     Body mass index is 28 84 kg/m²  ,     Systolic (93OEK), CYC:432 , Min:101 , WBL:585     Diastolic (53MVN), VTX:91, Min:47, Max:77            Intake/Output Summary (Last 24 hours) at 04/17/18 1111  Last data filed at 04/16/18 2149   Gross per 24 hour   Intake            48 33 ml   Output                0 ml   Net            48 33 ml     Weight (last 2 days)     Date/Time   Weight    04/16/18 2020  76 2 (167 99)            Invasive Devices     Peripheral Intravenous Line            Peripheral IV 04/16/18 Left Forearm 1 day                  Physical Exam   Constitutional: She is oriented to person, place, and time  No distress  Patient is sitting up in bed in NAD, alert pleasant and cooperative  HENT:   Head: Normocephalic and atraumatic  Neck: No JVD present  Cardiovascular: Normal rate, regular rhythm and S1 normal     No murmur heard  No lower extremity edema   Pulmonary/Chest: Effort normal and breath sounds normal  No respiratory distress  She has no wheezes  She has no rales     Occasional dry nonproductive cough  On room air without respiratory distress   Musculoskeletal: She exhibits no edema  Neurological: She is alert and oriented to person, place, and time  No focal deficits, very mild effusion noted   Skin: Skin is warm and dry  She is not diaphoretic  Psychiatric: She has a normal mood and affect  Her behavior is normal    Nursing note and vitals reviewed      LABORATORY RESULTS:    Results from last 7 days  Lab Units 04/16/18  1326   TROPONIN I ng/mL 0 06*     CBC with diff:   Results from last 7 days  Lab Units 04/17/18  0447 04/16/18  1326   WBC Thousand/uL 5 26 6 10   HEMOGLOBIN g/dL 9 3* 10 6*   HEMATOCRIT % 29 9* 33 7*   MCV fL 67* 68*   PLATELETS Thousands/uL  --  172   MCH pg 20 9* 21 3*   MCHC g/dL 31 1* 31 5   RDW % 19 0* 18 9*   NRBC AUTO /100 WBCs  --  0     CMP:  Results from last 7 days  Lab Units 04/17/18  0447 04/16/18  1326   SODIUM mmol/L 139 138   POTASSIUM mmol/L 3 8 4 5   CHLORIDE mmol/L 107 103   CO2 mmol/L 23 24   ANION GAP mmol/L 9 11   BUN mg/dL 36* 47*   CREATININE mg/dL 1 07 1 48*   GLUCOSE RANDOM mg/dL 80 105   CALCIUM mg/dL 6 6* 7 1   AST U/L 38 48*   ALT U/L 21 28   ALK PHOS U/L 60 73   TOTAL PROTEIN g/dL 6 4 7 4   BILIRUBIN TOTAL mg/dL 0 50 0 36   EGFR ml/min/1 73sq m 49 33     BMP:  Results from last 7 days  Lab Units 04/17/18  0447 04/16/18  1326   SODIUM mmol/L 139 138   POTASSIUM mmol/L 3 8 4 5   CHLORIDE mmol/L 107 103   CO2 mmol/L 23 24   BUN mg/dL 36* 47*   CREATININE mg/dL 1 07 1 48*   GLUCOSE RANDOM mg/dL 80 105   CALCIUM mg/dL 6 6* 7 1     Lab Results   Component Value Date    NTBNP 12,681 (H) 03/15/2018       Results from last 7 days  Lab Units 04/16/18  1326   INR  3 14*     Lipid Profile:   Lab Results   Component Value Date    CHOL 147 07/14/2017    CHOL 140 11/18/2016    CHOL 143 08/04/2016     Lab Results   Component Value Date    HDL 42 07/14/2017    HDL 52 11/18/2016    HDL 43 08/04/2016     Lab Results   Component Value Date    LDLCALC 63 07/14/2017 LDLCALC 50 2016    LDLCALC 64 2016     Lab Results   Component Value Date    TRIG 210 (H) 2017    TRIG 189 (H) 2016    TRIG 178 (H) 2016         Cardiac testing:   Results for orders placed during the hospital encounter of 18   Echo complete with contrast if indicated    Narrative [address]  [facility], [address] [facility]  [phone]    Transthoracic Echocardiogram  2D, M-mode, Doppler, and Color Doppler    Study date:  2018    Patient: Sergio Sauceda  MR number: [id number]  Account number: [de-identified]  : 1940  Age: [de-identified] years  Gender: Female  Status: Inpatient  Location: Bedside  Height: 64 in  Weight: 166 lb  BP: 102/ 52 mmHg    Indications: s/p 26 mm TAVR    Diagnoses: I35 9 - Nonrheumatic aortic valve disorder, unspecified    Sonographer:  SIOBHAN Jasso  Interpreting Physician:  Hemalatha Mendiola MD  Primary Physician:  Theo Currie MD  Referring Physician:  Iwona Enriquez MD  Group:  [facility]    SUMMARY    LEFT VENTRICLE:  Systolic function was at the lower limits of normal  Ejection fraction was estimated to be 50 %  Although no diagnostic regional wall motion abnormality was identified, this possibility cannot be completely excluded on the basis of this study  Wall thickness was moderately increased  There was moderate concentric hypertrophy  LEFT ATRIUM:  The atrium was moderately to markedly dilated  RIGHT ATRIUM:  The atrium was moderately dilated  MITRAL VALVE:  There was marked annular calcification  There was moderate diffuse thickening of the anterior and posterior leaflets, involving the leaflet from base to margin  There was moderately reduced leaflet separation  Transmitral velocity was increased due to valvular stenosis  There was moderate stenosis  There was mild regurgitation  Mean transmitral gradient was 9 mmHg      AORTIC VALVE:  A transcatheter bioprosthesis (#26 [address] Celine 3 TAVR) was present  The prosthesis was well-seated without stenosis or regurgitation  Peak and mean velocities (gradients) were 1 79 (13) and 1 14 (6) m/sec (mmHg), respectively  The  calculated aortic valve area was 1 6 cm2 using continuity equation       TRICUSPID VALVE:  There was moderate regurgitation  Estimated peak PA pressure was 56 mmHg  The findings suggest moderate pulmonary hypertension  PULMONIC VALVE:  There was trace regurgitation  HISTORY: PRIOR HISTORY: CAD, CABG, MI, DM2, HTN, Asthma, HLD, CHF, TIA    PROCEDURE: The procedure was performed at the bedside  This was a routine study  The transthoracic approach was used  The study included complete 2D imaging, M-mode, complete spectral Doppler, and color Doppler  The heart rate was 76 bpm,  at the start of the study  Images were obtained from the parasternal, apical, subcostal, and suprasternal notch acoustic windows  Image quality was adequate  LEFT VENTRICLE: Size was normal  Systolic function was at the lower limits of normal  Ejection fraction was estimated to be 50 %  Although no diagnostic regional wall motion abnormality was identified, this possibility cannot be completely  excluded on the basis of this study  Wall thickness was moderately increased  There was moderate concentric hypertrophy  DOPPLER: The study was not technically sufficient to allow evaluation of LV diastolic function  RIGHT VENTRICLE: The size was normal  Systolic function was normal     LEFT ATRIUM: The atrium was moderately to markedly dilated  RIGHT ATRIUM: The atrium was moderately dilated  MITRAL VALVE: There was marked annular calcification  There was moderate diffuse thickening of the anterior and posterior leaflets, involving the leaflet from base to margin  There was moderately reduced leaflet separation  DOPPLER:  Transmitral velocity was increased due to valvular stenosis  There was moderate stenosis   There was mild regurgitation  AORTIC VALVE: A transcatheter bioprosthesis (#26 Cervantes Celine 3 TAVR) was present  The prosthesis was well-seated without stenosis or regurgitation  Peak and mean velocities (gradients) were 1 79 (13) and 1 14 (6) m/sec (mmHg),  respectively  The calculated aortic valve area was 1 6 cm2 using continuity equation       TRICUSPID VALVE: The valve structure was normal  There was normal leaflet separation  DOPPLER: The transtricuspid velocity was within the normal range  There was no evidence for stenosis  There was moderate regurgitation  Estimated peak PA  pressure was 56 mmHg  The findings suggest moderate pulmonary hypertension  PULMONIC VALVE: Leaflets exhibited normal thickness, no calcification, and normal cuspal separation  DOPPLER: The transpulmonic velocity was within the normal range  There was trace regurgitation  PERICARDIUM: There was no pericardial effusion  The pericardium was normal in appearance  AORTA: The root exhibited normal size  MEASUREMENT TABLES    DOPPLER MEASUREMENTS  Mitral valve   (Reference normals)  Mean gradient   9 mmHg   (--)    SYSTEM MEASUREMENT TABLES    2D  %FS: 31 3 %  EDV(Teich): 130 17 ml  EF(Teich): 58 74 %  ESV(Teich): 53 71 ml  IVSd: 1 47 cm  LA Area: 32 29 cm2  LA Diam: 5 18 cm  LVEDV MOD A4C: 102 71 ml  LVEF MOD A4C: 53 9 %  LVESV MOD A4C: 47 35 ml  LVIDd: 5 21 cm  LVIDs: 3 58 cm  LVLd A4C: 7 62 cm  LVLs A4C: 6 52 cm  LVOT Diam: 2 1 cm  LVPWd: 1 28 cm  RA Area: 29 4 cm2  RVIDd: 4 5 cm  SV MOD A4C: 55 36 ml  SV(Teich): 76 46 ml    CW  AV Env  Ti: 346 02 ms  AV VTI: 39 39 cm  AV Vmax: 1 79 m/s  AV Vmean: 1 14 m/s  AV maxP 88 mmHg  AV meanP 4 mmHg  HR: 72 25 BPM  MV PHT: 82 71 ms  MV VTI: 53 79 cm  MV Vmax: 2 02 m/s  MV Vmean: 1 49 m/s  MV maxP 38 mmHg  MV meanP 57 mmHg  MVA By PHT: 2 66 cm2  TR Vmax: 3 26 m/s  TR maxP 46 mmHg    MM  TAPSE: 2 15 cm    PW  CHER (VTI): 1 62 cm2  CHER Vmax: 1 53 cm2  LVOT Env  Ti: 336 79 ms  LVOT VTI: 18 52 cm  LVOT Vmax: 0 8 m/s  LVOT Vmean: 0 55 m/s  LVOT maxP 54 mmHg  LVOT meanP 38 mmHg  LVSV Dopp: 63 81 ml  MV A Jayant: 2 1 m/s  MV Dec [place]: 5 6 m/s2  MV DecT: 355 98 ms  MV E Jayant: 1 99 m/s  MV E/A Ratio: 0 95  MV PHT: 103 23 ms  MVA (VTI): 1 19 cm2  MVA By PHT: 2 13 cm2    [facility]    Prepared and electronically signed by    Karime Jaramillo MD  Signed 2018 09:20:05       Results for orders placed during the hospital encounter of 03/15/18   ROBB    N[facility]ative [facility] [address]  [facility], [address] [facility]  [phone]    Transesophageal Echocardiogram  2D, 3D, Doppler, and Color Doppler    Study date:  19-Mar-2018    Patient: David Payne  MR number: [id number]  Account number: [de-identified]  : 1940  Age: 76 years  Gender: Female  Status: Inpatient  Location: Echo lab  Height: 64 in  Weight: 164 lb  BP: 164/ 77 mmHg    Indications: Aortic Valve    Diagnoses: I35 9 - Nonrheumatic aortic valve disorder, unspecified    Sonographer:  SIOBHAN Schafer  Interpreting Physician:  Karime Jaramillo MD  Primary Physician:  Claudean Perry, MD  Referring Physician:  Karime Jaramillo MD  [facility]  [facility]  Cardiology Fellow:  Dottie Hart MD    SUMMARY    LEFT VENTRICLE:  Systolic function was moderately reduced  Ejection fraction was estimated to be 35 %  There was moderate diffuse hypokinesis  Wall thickness was moderately increased  There was moderate concentric hypertrophy  LEFT ATRIUM:  The atrium was markedly dilated  ATRIAL SEPTUM:  No defect or patent foramen ovale was identified by color Doppler  RIGHT ATRIUM:  The atrium was moderately dilated  MITRAL VALVE:  There was marked annular calcification  There was marked diffuse thickening    There was marked calcification of the anterior and posterior leaflets, involving the leaflet base more than the margin  There was moderately reduced leaflet separation  There was moderate stenosis  There was mild to moderate regurgitation  Regurgitation grade was 1-2+ on a scale of 0 to 4+  Mean transmitral gradient was 8 mmHg  The mitral valve area by proximal isovelocity surface area method was 1 42 cm squared  The effective orifice of mitral regurgitation by proximal isovelocity surface area was 0 23 cm squared  The volume of mitral regurgitation by proximal isovelocity surface area was 33 ml  AORTIC VALVE:  The valve was trileaflet  Leaflets exhibited markedly increased thickness, marked calcification, and markedly reduced cuspal separation  There was critical stenosis  LVOT diameter was measured as 22 mm  The aortic annulus are measured 4 23 cm2  The nasima-posterior and horizontal diameters of aortic annulus measured 2 25 and 2 36 cm respectively  The aortic root measured 30  mm at sinus of valsalva and 27 mm at sino-tubular junction  The proximal ascending aorta measured 35 mm  There was mild regurgitation  Valve mean gradient was 30 mmHg  Estimated aortic valve area (by VTI) was 0 46 cm squared  Estimated aortic valve area (by Vmax) was 0 57 cm squared  TRICUSPID VALVE:  There was mild regurgitation  Estimated peak PA pressure was 60 mmHg  The findings suggest moderate pulmonary hypertension  PULMONIC VALVE:  There was trace regurgitation  AORTA:  There was atheroma in the proximal descending aorta  HISTORY: PRIOR HISTORY: Aortic stenosis, MI, CAD, HTN, HLD    PROCEDURE: The procedure was performed in the echo lab  This was a routine study  The risks and alternatives of the procedure were explained to the patient and informed consent was obtained  The transesophageal approach was used  The study  included complete 2D imaging, 3D imaging, complete spectral Doppler, and color Doppler  The heart rate was 75 bpm, at the start of the study   An adult omniplane probe was inserted by the cardiology fellow under direct supervision of the  attending cardiologist  Intubated with ease  One intubation attempt(s)  There was no blood detected on the probe  Image quality was adequate  MEDICATIONS: Benzocaine spray, topical to oropharynx, prior to procedure  Anesthesia  LEFT VENTRICLE: Size was normal  Systolic function was moderately reduced  Ejection fraction was estimated to be 35 %  There was moderate diffuse hypokinesis  Wall thickness was moderately increased  There was moderate concentric  hypertrophy  DOPPLER: The study was not technically sufficient to allow evaluation of LV diastolic function  RIGHT VENTRICLE: The size was normal  Systolic function was normal  Wall thickness was normal     LEFT ATRIUM: The atrium was markedly dilated  No thrombus was identified  APPENDAGE: The size was normal  No thrombus was identified  DOPPLER: The function was normal (normal emptying velocity)  ATRIAL SEPTUM: No defect or patent foramen ovale was identified by color Doppler  RIGHT ATRIUM: The atrium was moderately dilated  MITRAL VALVE: There was marked annular calcification  There was marked diffuse thickening  There was marked calcification of the anterior and posterior leaflets, involving the leaflet base more than the margin  There was moderately reduced  leaflet separation  There was no echocardiographic evidence of vegetation  DOPPLER: There was moderate stenosis  There was mild to moderate regurgitation  Regurgitation grade was 1-2+ on a scale of 0 to 4+  AORTIC VALVE: The valve was trileaflet  Leaflets exhibited markedly increased thickness, marked calcification, and markedly reduced cuspal separation  There was no echocardiographic evidence of vegetation  DOPPLER: There was critical  stenosis  LVOT diameter was measured as 22 mm  The aortic annulus are measured 4 23 cm2  The nasima-posterior and horizontal diameters of aortic annulus measured 2 25 and 2 36 cm respectively   The aortic root measured 30 mm at sinus of  valsalva and 27 mm at sino-tubular junction  The proximal ascending aorta measured 35 mm  There was mild regurgitation  TRICUSPID VALVE: The valve structure was normal  There was normal leaflet separation  There was no echocardiographic evidence of vegetation  DOPPLER: There was mild regurgitation  Estimated peak PA pressure was 60 mmHg  The findings  suggest moderate pulmonary hypertension  PULMONIC VALVE: Leaflets exhibited normal thickness, no calcification, and normal cuspal separation  There was no echocardiographic evidence of vegetation  DOPPLER: There was trace regurgitation  PERICARDIUM: There was no pericardial effusion  AORTA: The root exhibited normal size  There was atheroma in the proximal descending aorta  There was no evidence for dissection  There was no evidence for aneurysm      MEASUREMENT TABLES    2D MEASUREMENTS  LVOT   (Reference normals)  Diam   22 mm   (--)    DOPPLER MEASUREMENTS  LVOT   (Reference normals)  Peak alexander   58 cm/s   (--)  Mean alexander   33 cm/s   (--)  VTI   13 cm   (--)  Stroke vol   49 42 ml   (--)  Stroke index   0 3 ml/m squared   (--)  Aortic valve   (Reference normals)  Peak alexander   397 cm/s   (--)  Mean alexander   270 cm/s   (--)  VTI   106 cm   (--)  Peak gradient   63 mmHg   (--)  Mean gradient   30 mmHg   (--)  Obstr index, VTI   0 12    (--)  Valve area, VTI   0 46 cm squared   (--)  Area index, VTI   0 26 cm squared/m squared   (--)  Obstr index, Vmax   0 15    (--)  Valve area, Vmax   0 57 cm squared   (--)  Area index, Vmax   0 32 cm squared/m squared   (--)  Obstr index, Vmean   0 12    (--)  Valve area, Vmean   0 46 cm squared   (--)  Area index, Vmean   0 26 cm squared/m squared   (--)  Mitral valve   (Reference normals)  Mean gradient   8 mmHg   (--)  Alias alexander, PISA   88 9 cm/s   (--)  Radius, PISA   11 5 mm   (--)  Max inflow alexander   173 cm/s   (--)  Leaflet angle   60 °   (--)  Max flow rate   738 72 ml/s   (--)  Valve area, PISA   1 42 cm squared   (--)  Regurg alias alexander   36 6 cm/s   (--)  Regurg PISA radius   7 5 mm   (--)  Max MR alexander   556 cm/s   (--)  Regurg VTI   145 cm   (--)  Max MR flow rate   129 36 ml/s   (--)  Area, ERO, PISA   0 23 cm squared   (--)  Regurg vol, PISA   33 ml   (--)  RF, PISA   40 %   (--)    [facility] Echocardiography Laboratory    Prepared and electronically signed by    Tawana Guzman MD  Signed 19-Mar-2018 16:18:36       No procedure found  No results found for this or any previous visit  Imaging: I have personally reviewed pertinent reports  Xr Chest Portable    Result Date: 4/4/2018  Narrative: CHEST INDICATION:   Post Open Heart Surgey  COMPARISON:  4/3/2018 EXAM PERFORMED/VIEWS:  XR CHEST PORTABLE FINDINGS:  Stable right IJ catheter, tip in the caval atrial junction region  Heart shadow is enlarged but unchanged from prior exam  Decreased small left effusion  No new consolidation  No pneumothorax  Osseous structures appear within normal limits for patient age  Impression: Decreased small left effusion  No new consolidation  Workstation performed: [id number]     Xr Chest 2 Views    Result Date: 4/16/2018  Narrative: CHEST INDICATION:   cough  COMPARISON:  4/4/2018 EXAM PERFORMED/VIEWS:  XR CHEST PA & LATERAL Images: 2 FINDINGS: Heart shadow is enlarged but unchanged from prior exam  The lungs are clear  No pneumothorax or pleural effusion  Osseous structures appear within normal limits for patient age  Impression: No acute cardiopulmonary disease  Workstation performed: [id number]     Ct Head Without Contrast    Result Date: 4/16/2018  Narrative: CT BRAIN - WITHOUT CONTRAST INDICATION:   Family noticed patient having difficulty with speech  COMPARISON:  CT head 6/27/2017 TECHNIQUE:  CT examination of the brain was performed  In addition to axial images, coronal 2D reformatted images were created and submitted for interpretation    Radiation dose length product (DLP) for this visit:  950 4 mGy-cm   This examination, like all CT scans performed in the [facility], was performed utilizing techniques to minimize radiation dose exposure, including the use of iterative reconstruction and automated exposure control  IMAGE QUALITY:  Diagnostic  FINDINGS: PARENCHYMA:  No intracranial mass, mass effect or midline shift  No CT signs of acute infarction  No acute intracranial hemorrhage  Age-related cortical atrophy  Advanced periventricular and subcortical white matter hypodensity which is nonspecific although most compatible with chronic small vessel ischemic disease  Vascular and bilateral basal ganglia calcifications  VENTRICLES AND EXTRA-AXIAL SPACES:  Normal for the patient's age  VISUALIZED ORBITS AND PARANASAL SINUSES:  No acute abnormality involving the orbits  Mild scattered sinus mucosal thickening is noted  No fluid levels are seen  CALVARIUM AND EXTRACRANIAL SOFT TISSUES:  Normal      Impression: No acute intracranial abnormality  Age related atrophy and findings most compatible with chronic small vessel ischemic disease  Workstation performed: [id number]     Mri Brain Wo Contrast    Result Date: 4/17/2018  Narrative: MRI BRAIN WITHOUT CONTRAST INDICATION:  expressive aphasia  patient states she knew what she wanted to say but couldn't get the words out-episode occurred yesterday COMPARISON:   4/16/2018 TECHNIQUE:  Sagittal T1, axial T2, axial FLAIR, axial T1, axial [place] and axial diffusion imaging  IMAGE QUALITY:  Diagnostic  FINDINGS: BRAIN PARENCHYMA:  There are at least 4 discrete punctate foci of diffusion restriction including one in the left cerebellum on image 4, series 3  There is a small to moderate-sized cortical infarction in the right occipital lobe on image 13, series 3 in the right posterior cerebral artery territory    More superiorly in the right parietal lobe posteriorly on image 22 series 3 there is a 3rd focus of cortical diffusion restriction in the right MCA territory  A tiny subtle punctate cortical focus in the left frontal lobe anteriorly and inferiorly on image 12 series 3 is noted in the left MCA territory implicating multiple vascular territories with no areas of acute infarction  Elsewhere there is confluent periventricular FLAIR hyperintensity  Striated brainstem and cerebellar hyperintensity noted as well  No acute intracranial hemorrhage  No extra-axial fluid collection  Cerebellar tonsils are normally positioned  VENTRICLES:  Age-appropriate prominence noted  SELLA AND PITUITARY GLAND:  Normal  ORBITS:  Bilateral lens implants noted  PARANASAL SINUSES:  Moderate right sphenoid sinus signal abnormality  Mild bilateral ethmoidal sinus disease  VASCULATURE:  Evaluation of the major intracranial vasculature demonstrates appropriate flow voids  CALVARIUM AND SKULL BASE:  Normal  EXTRACRANIAL SOFT TISSUES:  Normal      Impression: 1   4 punctate foci of diffusion restriction indicative of scattered tiny acute/recent lacunar infarctions in 4 distinct vascular territories implicating a central embolic source  Does the patient have atrial fibrillation? 2  Advanced, chronic microangiopathy  Workstation performed: [id number]     Xr Chest Portable Icu    Result Date: 4/3/2018  Narrative: CHEST portable INDICATION: S/P TAVR COMPARISON: March 16, 2018 VIEWS:  AP semierect; IMAGES:  1 FINDINGS: Right internal jugular catheter tip at the atrial caval junction  Endotracheal tube tip 4 8 cm above the dwayne  Midline sternotomy wires are present from previous cardiac surgery  The cardiomediastinal silhouette is unchanged  Aortic valve replacement prosthesis, new in the interval  Blunting left costophrenic angle may represent small pleural effusion  Lungs are otherwise well aerated without opacities  Bony thorax is otherwise unremarkable   Electrode (EKG) monitoring devices overlie the chest      Impression: Lines and tubes as described above  Status post aortic valve replacement  No pneumothorax  Small left pleural effusion questioned    Workstation performed: [id number]       EKG reviewed personally:   4/16/2018  Sinus tachycardia  First-degree AV block  LBBB    Telemetry reviewed personally:   Sinus rhythm with ist degree AV block  LBBB    Assessment  Principal Problem:    [facility]  Active Problems:    Essential hypertension    CAD (coronary artery disease)    Type 2 diabetes mellitus with complication, with long-term current use of insulin (HCC)    Hyperlipidemia    Gastroesophageal reflux disease without esophagitis    Moderate mitral stenosis    LEONARD (acute kidney injury) ([facility] )    Asymptomatic carotid artery stenosis, bilateral    Atherosclerosis of artery of extremity with ulceration (HCC)    Hx of CABG    Migraine headache    Persistent atrial fibrillation (HCC)    Ulcer of toe of left foot (HCC)    Ulcer of toe of right foot (HCC)    Left bundle branch block (LBBB)    1st degree AV block    S/P TAVR (transcatheter aortic valve replacement)    Expressive aphasia    Assessment/ Plan:    CVA; patient presented with aphasia for several days  She has a known history of paroxysmal atrial fibrillation/flutter  She is chronically anticoagulated with warfarin  Reviewed prior blood INR; she has been subtherapeutic on multiple occasions since mid March  Her INR on discharge on 4/6 was 1 37; I called [facility] and INR on 4/9 was 2 1 and again on 4/11 was 2 4  Her INR yesterday was 3 14  Continue Warfarin for goal 2-3; if patients Coumadin needs to be held in future she should be bridged with IV heparin       Paroxysmal atrial fibrillation/flutter:  Patient appears to be maintaining sinus rhythm, continue home dose metoprolol 100 mg b i d   Anticoagulation as above    CAD with prior CABG x4:  Done at [facility]  Recent preoperative cardiac catheterization showed significant triple-vessel disease with patent BASSETT to LAD, patent vein graft to D1-to-3/sequential; occluded vein graft to OM 1 and vein graft to PDA  Continue aspirin, statin and beta-blocker  No anginal symptoms, trivial elevation in troponin to 0 06  Likely due to her current CVA and recent cardiac valve surgery  History of severe aortic stenosis:  Status post TAVR on 4/3/2018  Postoperative echo 4/4 showed bioprosthetic valve well seated with normal function  She has no signs or symptoms of heart failure on exam    Mitral valve disease: Moderate mitral stenosis and mild mitral regurgitation  Follow-up outpatient    Mixed dyslipidemia:  Continue statin    History of systolic heart failure:  Postoperative EF low normal at 50%  She takes Lasix 40 mg b i d  as outpatient, her creatinine was slightly elevated above baseline admission which improved with intravenous fluids  She appears well-compensated and euvolemic on exam   Stop intravenous fluids today, resume Lasix likely tomorrow; she likely will need daily dosing as she has now had her aortic valve replaced  Thank you for allowing us to participate in this patient's care  This pt will follow up with Dr Mirtha Martinez once discharged  Counseling / Coordination of Care  Total floor / unit time spent today 50 minutes  Greater than 50% of total time was spent with the patient and / or family counseling and / or coordination of care  A description of the counseling / coordination of care: Review of history, current assessment, development of a plan  Code Status: Level 1 - Full Code    ** Please Note: Dragon 360 Dictation voice to text software may have been used in the creation of this document   **

## 2018-04-17 NOTE — CONSULTS
Consultation - Neurology   Nash Guzman [de-identified] y o  female MRN: [id number]  Unit/Bed#: [facility] 726-01 Encounter: [id number]      Assessment/Plan   Assessment:  25-year-old female with past medical history of recent aortic valve replacement for aortic stenosis, atrial fibrillation/AF flutter anticoagulated with Coumadin, hypertension, hyperlipidemia, diabetes, CHF, PAD, presenting with several days duration of dysarthric speech and word-finding difficulty  MRI brain as documented below demonstrates bihemispheric multi vascular territory punctate infarctions with concern for central embolic source  Plan:  1  Stroke pathway: MRI brain revealing several embolic appearing infarctions (multiple vascular distributions/bihemispheric)   -Will obtain carotid dopplers, No CTA head/neck due to documented allergy to iodine (flash pulmonary edema reaction documented)   -Recent Echo performed on 4/4/18, no need to repeat (EF 50%, moderate atrial dilation bilaterally   -Will recommend ROBB to evaluate for intracardiac thrombus given new embolic infarctions and recent TAVR (discussed with  Cardiology  2   Likely will anticipate transition to NOAC (likely Eliquis) given stroke on supratherapeutic Coumadin  Continuing aspirin with cardiac history and high dose statin  3   Recommend check hemoglobin A1c and lipid panel  4   Telemetry monitoring  5   Frequent neuro checks  6   Stroke education provided  7   PT/OT evaluations  8   Discussed plan of care with attending neurologist   Will continue to monitor neurologic status throughout workup      History of Present Illness     Reason for Consult / Principal Problem: Expressive Aphasia  Hx and PE limited by:  Patient poor historian  HPI: Nash Guzman is a [de-identified] y o  female with past medical history of recent aortic valve replacement for aortic stenosis, atrial fibrillation/a-flutter anticoagulated with Coumadin, hypertension, hyperlipidemia, diabetes, CHF, PAD who presents with several days duration of expressive aphasia/word-finding difficulty and dysarthric speech per family  Patient was recently admitted in March for aortic valve replacement (postoperative rapid AFib/a flutter) she was discharged to [facility] rehabilitation and did well there  She was recently discharged on Sunday; over the weekend family initially noticed patient slurring her speech and having trouble with word-finding  Patient also noticed over the past several days she has taken a longer time to find the right word to say with conversation  She denies any clear vision abnormalities, focal weakness or sensory changes  Currently today she notes some slowing with word-finding, otherwise is doing well without neurologic complaints  Family history of stroke in her mother, never a smoker  Inpatient consult to Neurology  Consult performed by: Adrian Weston ordered by: Rosalia Guy          Review of Systems   Constitutional: Negative  HENT: Negative for trouble swallowing and voice change  Eyes: Negative for photophobia and visual disturbance  Respiratory: Negative for chest tightness and shortness of breath  Cardiovascular: Negative for chest pain and palpitations  Gastrointestinal: Negative for abdominal pain, nausea and vomiting  Genitourinary: Negative  Musculoskeletal: Negative  Skin: Negative  Neurological: Positive for speech difficulty  Negative for dizziness, tremors, seizures, syncope, facial asymmetry, weakness, light-headedness, numbness and headaches         Historical Information   Past Medical History:   Diagnosis Date    Altered mental status     Anticoagulated     Coumadin for Aflutter    Asthma     Atrial flutter ([facility] )     maintained on coumadin anticoag    CAD (coronary artery disease)     Candidiasis     Carotid stenosis, bilateral     Cataract     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) (HCC)     Chronic fatigue syndrome     Chronic low back pain     Concussion w loss of consciousness of unsp duration, init     Coronary artery disease     Diabetes mellitus ([facility] )     type 2, insulin dependent    DJD (degenerative joint disease)     GERD (gastroesophageal reflux disease)     Herpes zoster     HLD (hyperlipidemia)     HTN (hypertension)     Hyperlipidemia     Hypothyroidism     Irritable bowel syndrome     Lumbar radiculopathy     Lyme disease     Dx in hospital 8/2011    Lyme disease     MI (myocardial infarction) ([facility] )     Migraines     Muscle spasm     Non-neoplastic nevus     Nontoxic multinodular goiter     OA (osteoarthritis)     Osteoarthritis     Other chronic pain     PAD (peripheral artery disease) (HCC)     Palpitations     Pseudogout     Spinal stenosis     Transient cerebral ischemia     Trigger ring finger     Viral gastroenteritis      Past Surgical History:   Procedure Laterality Date    APPENDECTOMY      ARTERIOGRAM  12/19/2017    CARDIAC CATHETERIZATION      CHOLECYSTECTOMY      COLONOSCOPY      CORONARY ARTERY BYPASS GRAFT  2004    LUMBAR LAMINECTOMY      VT ECHO TRANSESOPHAG R-T 2D W/PRB IMG ACQUISJ I&R N/A 4/3/2018    Procedure: TRANSESOPHAGEAL ECHOCARDIOGRAM (ROBB);   Surgeon: Inna Matute DO;  Location: [facility];  Service: Cardiac Surgery    VT REPLACE AORTIC VALVE OPENFEMORAL ARTERY APPROACH N/A 4/3/2018    Procedure: REPLACEMENT AORTIC VALVE TRANSCATHETER (TAVR) TRANSFEMORAL w/ 26MM IVY YEVGENIY S3 VALVE;  Surgeon: Inna Matute DO;  Location: [facility];  Service: Cardiac Surgery    THYROIDECTOMY      TOTAL ABDOMINAL HYSTERECTOMY W/ BILATERAL SALPINGOOPHORECTOMY       Social History   History   Alcohol Use No     History   Drug Use No     History   Smoking Status    Never Smoker   Smokeless Tobacco    Never Used     Family History:   Family History   Problem Relation Age of Onset    Cancer Mother     Stroke Mother     Cancer Father  Heart disease Father     Breast cancer Sister     Diabetes Daughter      Passed away January 2017        Review of previous medical records was completed  Meds/Allergies   current meds:   Current Facility-Administered Medications   Medication Dose Route Frequency    acetaminophen (TYLENOL) tablet 650 mg  650 mg Oral Q6H PRN    aspirin chewable tablet 81 mg  81 mg Oral Daily    atorvastatin (LIPITOR) tablet 80 mg  80 mg Oral Daily    cholecalciferol (VITAMIN D3) tablet 1,000 Units  1,000 Units Oral Daily    docusate sodium (COLACE) capsule 100 mg  100 mg Oral BID PRN    fluticasone (FLOVENT HFA) 110 MCG/ACT inhaler 1 puff  1 puff Inhalation Q12H [address]    insulin glargine (LANTUS) subcutaneous injection 25 Units  25 Units Subcutaneous Daily With Breakfast    insulin glargine (LANTUS) subcutaneous injection 25 Units  25 Units Subcutaneous HS    levothyroxine tablet 100 mcg  100 mcg Oral Early Morning    metoprolol tartrate (LOPRESSOR) tablet 100 mg  100 mg Oral Q12H LANCE    potassium chloride (K-DUR,KLOR-CON) CR tablet 10 mEq  10 mEq Oral BID    sodium chloride 0 9 % infusion  100 mL/hr Intravenous Continuous    warfarin (COUMADIN) tablet 3 mg  3 mg Oral Daily (warfarin)    and PTA meds:   Prior to Admission Medications   Prescriptions Last Dose Informant Patient Reported? Taking? LANTUS 100 UNIT/ML subcutaneous injection  Self No Yes   Sig: Inject 30 Units under the skin 2 (two) times a day IN THE MORNING AND 30 UNITS AT BEDTIME   Patient taking differently: Inject 10 Units under the skin 2 (two) times a day IN THE MORNING AND 30 UNITS AT BEDTIME     Mometasone Furoate (ASMANEX HFA) 100 MCG/ACT AERO  Self No Yes   Sig: Inhale 2 puffs once daily   acetaminophen (TYLENOL) 650 mg CR tablet   No Yes   Sig: Take 1 tablet (650 mg total) by mouth every 8 (eight) hours as needed for mild pain Do not take in conjunction with Percocet     albuterol (VENTOLIN HFA) 90 mcg/act inhaler  Self Yes Yes   Sig: Inhale 108 mcg 2 (two) times a day as needed 2 puffs bid PRN   aspirin 81 MG tablet  Self Yes Yes   Sig: Take 81 mg by mouth daily   atorvastatin (LIPITOR) 80 mg tablet  Self Yes Yes   Sig: Take 80 mg by mouth daily   calcium carbonate (TUMS) 500 mg chewable tablet  Self Yes Yes   Sig: Chew 2 tablets 3 (three) times a day     cholecalciferol (VITAMIN D3) 1,000 units tablet  Self Yes Yes   Sig: Take 1,000 Units by mouth daily     diphenhydrAMINE (BANOPHEN) 50 mg capsule   Yes Yes   Sig: Take 50 mg by mouth daily   docusate sodium (COLACE) 100 mg capsule   No Yes   Sig: Take 1 capsule (100 mg total) by mouth 2 (two) times a day   esomeprazole (NexIUM) 20 mg capsule  Self Yes Yes   Sig: Take 20 mg by mouth daily in the early morning     furosemide (LASIX) 40 mg tablet  Self No Yes   Sig: Take 1 tablet (40 mg total) by mouth 2 (two) times a day   levothyroxine 100 mcg tablet  Self Yes Yes   Sig: Take 100 mcg by mouth daily   lisinopril (ZESTRIL) 5 mg tablet  Self Yes Yes   Sig: Take 2 5 mg by mouth daily at bedtime     loratadine (CLARITIN) 10 mg tablet  Self No Yes   Sig: Take 1 tablet (10 mg total) by mouth daily   metFORMIN (GLUCOPHAGE) 1000 MG tablet  Self Yes Yes   Si,000 mg 2 (two) times a day   metoprolol tartrate (LOPRESSOR) 100 mg tablet   No Yes   Sig: Take 1 tablet (100 mg total) by mouth every 12 (twelve) hours   nitroglycerin (NITROSTAT) 0 4 mg SL tablet  Self Yes Yes   Sig: Place 0 4 mg under the tongue every 3 (three) minutes as needed   potassium chloride (K-DUR,KLOR-CON) 10 mEq tablet  Self No Yes   Sig: Take 1 tablet (10 mEq total) by mouth 2 (two) times a day   warfarin (COUMADIN) 3 mg tablet  Self Yes Yes   Sig: Take 3 mg by mouth daily 6 mg Sat and Sun, 3 mg all other days       Facility-Administered Medications: None       Allergies   Allergen Reactions    Other Chest Pain     IVP-listed as "chest pain" in previous chart, patient stated this occurred "a long time ago" but does not remember exactly occurred     Cephalosporins Chest Pain    Contrast [Iodinated Diagnostic Agents] Other (See Comments)     Flash pulm edema    Doxycycline Chest Pain    Levaquin [Levofloxacin] Chest Pain    Ondansetron Chest Pain     Prolonged QT    Toradol [Ketorolac Tromethamine] Chest Pain       Objective   Vitals:Blood pressure 101/61, pulse 82, temperature 98 9 °F (37 2 °C), temperature source Oral, resp  rate 18, height 5' 4" (1 626 m), weight 76 2 kg (167 lb 15 9 oz), SpO2 95 %, not currently breastfeeding  ,Body mass index is 28 84 kg/m²  Intake/Output Summary (Last 24 hours) at 04/17/18 0714  Last data filed at 04/16/18 2149   Gross per 24 hour   Intake            48 33 ml   Output                0 ml   Net            48 33 ml       Invasive Devices: Invasive Devices     Peripheral Intravenous Line            Peripheral IV 04/16/18 Left Forearm 1 day                Physical Exam   Constitutional: She is oriented to person, place, and time  She appears well-developed and well-nourished  HENT:   Head: Normocephalic and atraumatic  Eyes: Conjunctivae and EOM are normal  Pupils are equal, round, and reactive to light  Neck: Normal range of motion  Neck supple  Cardiovascular: Regular rhythm  Tachycardia present  Pulmonary/Chest: Effort normal and breath sounds normal    Abdominal: Soft  Bowel sounds are normal    Musculoskeletal: Normal range of motion  Neurological: She is alert and oriented to person, place, and time  She has a normal Finger-Nose-Finger Test and a normal Heel to [employer]    Reflex Scores:       Patellar reflexes are 1+ on the right side and 1+ on the left side  Skin: Skin is warm and dry  Neurologic Exam     Mental Status   Oriented to person, place, and time  Speech is fluent without dysarthria or aphasia (with conversation and NIH stroke scale card dense sensory eating and object naming)  Performs cross-body commands        Cranial Nerves     CN III, IV, VI   Pupils are equal, round, and reactive to light  Extraocular motions are normal      Motor Exam   Muscle bulk: normal  Overall muscle tone: normal  Right arm pronator drift: absent  Left arm pronator drift: absent  5/5 UE and LE throughout with exception of subtle L hip flexor weakness  Sensory Exam   Light touch normal    Right arm vibration: normal  Left arm vibration: normal  Right leg vibration: normal  Left leg vibration: decreased from ankle    Cool temperature sensation throughout  Minimally delayed response to double simultaneous tactile stim on the left compared to right lower extremity       Gait, Coordination, and Reflexes     Coordination   Finger to nose coordination: normal  Heel to shin coordination: normal    Tremor   Resting tremor: absent  Intention tremor: absent    Reflexes   Right patellar: 1+  Left patellar: 1+  Right plantar: normal  Left plantar: normal  Right ankle clonus: absent  Left ankle clonus: absent      Lab Results:   CBC:   Results from last 7 days  Lab Units 04/17/18  0447 04/16/18  1326   WBC Thousand/uL 5 26 6 10   RBC Million/uL  --  4 98   HEMOGLOBIN g/dL 9 3* 10 6*   HEMATOCRIT % 29 9* 33 7*   MCV fL 67* 68*   PLATELETS Thousands/uL  --  172   , BMP/CMP:   Results from last 7 days  Lab Units 04/17/18  0447 04/16/18  1326   SODIUM mmol/L 139 138   POTASSIUM mmol/L 3 8 4 5   CHLORIDE mmol/L 107 103   CO2 mmol/L 23 24   ANION GAP mmol/L 9 11   BUN mg/dL 36* 47*   CREATININE mg/dL 1 07 1 48*   GLUCOSE RANDOM mg/dL 80 105   CALCIUM mg/dL 6 6* 7 1   AST U/L 38 48*   ALT U/L 21 28   ALK PHOS U/L 60 73   TOTAL PROTEIN g/dL 6 4 7 4   BILIRUBIN TOTAL mg/dL 0 50 0 36   EGFR ml/min/1 73sq m 49 33   , Vitamin B12:   , HgBA1C:   , TSH:   , Coagulation:   Results from last 7 days  Lab Units 04/16/18  1326   INR  3 14*   , Lipid Profile:   , Urinalysis:       Invalid input(s): URIBILINOGEN  Imaging Studies: I have personally reviewed pertinent films in PACS   CT head 4/16/18: No acute intracranial abnormality        Age related atrophy and findings most compatible with chronic small vessel ischemic disease  MRI brain 4/17/18: 1   4 punctate foci of diffusion restriction indicative of scattered tiny acute/recent lacunar infarctions in 4 distinct vascular territories implicating a central embolic source  Does the patient have atrial fibrillation? 2  Advanced, chronic microangiopathy  EKG, Pathology, and Other Studies: I have personally reviewed pertinent reports  VTE Prophylaxis: Sequential compression device (Venodyne)  and Warfarin (Coumadin)    Code Status: Level 1 - Full Code    Counseling / Coordination of Care  Total time spent today 60 minutes  Greater than 50% of total time was spent with the patient and / or family counseling and / or coordination of care  A description of the counseling / coordination of care: Discussed plan of care with patient

## 2018-04-17 NOTE — PROGRESS NOTES
IM Residency Progress Note   Unit/Bed#: [facility] 726-01 Encounter: [id number]  SOD Team C       Judi Risen [de-identified] y o  female [id number]    Hospital Stay Days: 1      Assessment/Plan:    Principal Problem:    CVA (cerebral vascular accident) [facility])  Active Problems:    Essential hypertension    CAD (coronary artery disease)    Type 2 diabetes mellitus with complication, with long-term current use of insulin (HCC)    Hyperlipidemia    Gastroesophageal reflux disease without esophagitis    Moderate mitral stenosis    LEONARD (acute kidney injury) ([facility] [address] )    Asymptomatic carotid artery stenosis, bilateral    Atherosclerosis of artery of extremity with ulceration (HCC)    Hx of CABG    Migraine headache    Persistent atrial fibrillation (HCC)    Ulcer of toe of left foot (HCC)    Ulcer of toe of right foot (HCC)    Left bundle branch block (LBBB)    1st degree AV block    S/P TAVR (transcatheter aortic valve replacement)    Expressive aphasia      CVA  -MRI with several small, likely embolic regions  -Patient is not below therapeutic range of INR on coumadin   -Neuro checks Q4H  -Will not use stroke pathway due to subacute presentation  -Neurology consult pending      Elevated troponin  -Significance unclear given recent cardiac procedure and tachycardia with known CAD  -Clinically stable  -Continue to monitor with telemetry      Acute kidney injury  -Likely prerenal with volume contraction on exam   -Improving with normal saline at 100cc/hr   -Hold ACEI  -Trend Cr      Chronic heart failure with AS s/p TAVR  -Patient on 40mg BID furosemide at home  -Appears volume contracted on exam, likely iatrogenic  -Consult cardiology      IDDM2  -Diabetic diet   -On 30u BID basal insulin with fair control at home at 7 5%  -Use 20u BID basal insulin with QID fingerstick  -Hold metformin with LEONARD    -Goal blood sugar < 180 in hospital      CAD s/p CABG  -Continue ASA, high intensity statin     Atrial fibrillation/flutter  -Continue rate control strategy and anticoagulation with coumadin      Asthma  -Continue inhaled steroid  -beta agonist therapy PRN  Disposition: Continued workup for embolic CVA  Subjective:   Patient continues to have aphasia this morning  She reports no pain or weakness  She has no chest pain, dyspnea or palpitations  Vitals: Temp (24hrs), Av 1 °F (37 3 °C), Min:98 9 °F (37 2 °C), Max:99 4 °F (37 4 °C)  Current: Temperature: 98 9 °F (37 2 °C)  Vitals:    18 1915 18 2020 18 2314 18 0703   BP: 134/72 131/67 (!) 104/47 101/61   BP Location:  Right arm Left arm Right arm   Pulse: (!) 110 102 83 82   Resp:   18   Temp:  98 9 °F (37 2 °C) 99 4 °F (37 4 °C) 98 9 °F (37 2 °C)   TempSrc:  Oral Oral Oral   SpO2: 98% 97% 95% 95%   Weight:  76 2 kg (167 lb 15 9 oz)     Height:  5' 4" (1 626 m)      Body mass index is 28 84 kg/m²  I/O last 24 hours: In: 48 3 [I V :48 3]  Out: -       Physical Exam:   Gen: Sitting, NAD  HEENT: Aniceric, no injection, membranes mildly dry, no JVD  CV: RRR  Pulm: CTA BL, cough noted during exam  Abd: NT/ND  Extr: No LE edema       Invasive Devices     Peripheral Intravenous Line            Peripheral IV 18 Left Forearm 1 day                      Labs:   Recent Results (from the past 24 hour(s))   CBC and differential    Collection Time: 18  1:26 PM   Result Value Ref Range    WBC 6 10 4 31 - 10 16 Thousand/uL    RBC 4 98 3 81 - 5 12 Million/uL    Hemoglobin 10 6 (L) 11 5 - 15 4 g/dL    Hematocrit 33 7 (L) 34 8 - 46 1 %    MCV 68 (L) 82 - 98 fL    MCH 21 3 (L) 26 8 - 34 3 pg    MCHC 31 5 31 4 - 37 4 g/dL    RDW 18 9 (H) 11 6 - 15 1 %    Platelets 646 343 - 801 Thousands/uL    nRBC 0 /100 WBCs    Neutrophils Relative 61 43 - 75 %    Lymphocytes Relative 24 14 - 44 %    Monocytes Relative 12 4 - 12 %    Eosinophils Relative 2 0 - 6 %    Basophils Relative 1 0 - 1 %    Neutrophils Absolute 3 70 1 85 - 7 62 Thousands/µL    Lymphocytes Absolute 1 49 0 60 - 4 47 Thousands/µL    Monocytes Absolute 0 75 0 17 - 1 22 Thousand/µL    Eosinophils Absolute 0 12 0 00 - 0 61 Thousand/µL    Basophils Absolute 0 03 0 00 - 0 10 Thousands/µL   Protime-INR    Collection Time: 04/16/18  1:26 PM   Result Value Ref Range    Protime 32 7 (H) 12 1 - 14 4 seconds    INR 3 14 (H) 0 86 - 1 16   APTT    Collection Time: 04/16/18  1:26 PM   Result Value Ref Range    PTT 47 (H) 23 - 35 seconds   Comprehensive metabolic panel    Collection Time: 04/16/18  1:26 PM   Result Value Ref Range    Sodium 138 136 - 145 mmol/L    Potassium 4 5 3 5 - 5 3 mmol/L    Chloride 103 100 - 108 mmol/L    CO2 24 21 - 32 mmol/L    Anion Gap 11 4 - 13 mmol/L    BUN 47 (H) 5 - 25 mg/dL    Creatinine 1 48 (H) 0 60 - 1 30 mg/dL    Glucose 105 65 - 140 mg/dL    Calcium 7 1 mg/dL    AST 48 (H) 5 - 45 U/L    ALT 28 12 - 78 U/L    Alkaline Phosphatase 73 46 - 116 U/L    Total Protein 7 4 6 4 - 8 2 g/dL    Albumin 3 4 (L) 3 5 - 5 0 g/dL    Total Bilirubin 0 36 0 20 - 1 00 mg/dL    eGFR 33 ml/min/1 73sq m   Troponin I    Collection Time: 04/16/18  1:26 PM   Result Value Ref Range    Troponin I 0 06 (H) <=0 04 ng/mL   ECG 12 lead    Collection Time: 04/16/18  1:36 PM   Result Value Ref Range    Ventricular Rate 102 BPM    Atrial Rate 102 BPM    UT Interval  ms    QRSD Interval 146 ms    QT Interval 386 ms    QTC Interval 503 ms    P [place] 79 degrees    QRS Axis -57 degrees    T Wave Axis 97 degrees   Fingerstick Glucose (POCT)    Collection Time: 04/16/18  8:59 PM   Result Value Ref Range    POC Glucose 125 65 - 140 mg/dl   Comprehensive metabolic panel    Collection Time: 04/17/18  4:47 AM   Result Value Ref Range    Sodium 139 136 - 145 mmol/L    Potassium 3 8 3 5 - 5 3 mmol/L    Chloride 107 100 - 108 mmol/L    CO2 23 21 - 32 mmol/L    Anion Gap 9 4 - 13 mmol/L    BUN 36 (H) 5 - 25 mg/dL    Creatinine 1 07 0 60 - 1 30 mg/dL    Glucose 80 65 - 140 mg/dL    Calcium 6 6 (L) 8 3 - 10 1 mg/dL    AST 38 5 - 45 U/L    ALT 21 12 - 78 U/L    Alkaline Phosphatase 60 46 - 116 U/L    Total Protein 6 4 6 4 - 8 2 g/dL    Albumin 2 9 (L) 3 5 - 5 0 g/dL    Total Bilirubin 0 50 0 20 - 1 00 mg/dL    eGFR 49 ml/min/1 73sq m   CBC (With Platelets)    Collection Time: 04/17/18  4:47 AM   Result Value Ref Range    WBC 5 26 4 31 - 10 16 Thousand/uL    RBC  3 81 - 5 12 Million/uL    Hemoglobin 9 3 (L) 11 5 - 15 4 g/dL    Hematocrit 29 9 (L) 34 8 - 46 1 %    MCV 67 (L) 82 - 98 fL    MCH 20 9 (L) 26 8 - 34 3 pg    MCHC 31 1 (L) 31 4 - 37 4 g/dL    RDW 19 0 (H) 11 6 - 15 1 %    Platelets  691 - 225 Thousands/uL   Fingerstick Glucose (POCT)    Collection Time: 04/17/18  6:30 AM   Result Value Ref Range    POC Glucose 78 65 - 140 mg/dl       Radiology Results: I have personally reviewed pertinent reports  and I have personally reviewed pertinent films in PACS  Xr Chest 2 Views    Result Date: 4/16/2018  Impression: No acute cardiopulmonary disease  Workstation performed: [id number]     Ct Head Without Contrast    Result Date: 4/16/2018  Impression: No acute intracranial abnormality  Age related atrophy and findings most compatible with chronic small vessel ischemic disease  Workstation performed: [id number]     Mri Brain Wo Contrast    Result Date: 4/17/2018  Impression: 1   4 punctate foci of diffusion restriction indicative of scattered tiny acute/recent lacunar infarctions in 4 distinct vascular territories implicating a central embolic source  Does the patient have atrial fibrillation? 2  Advanced, chronic microangiopathy   Workstation performed: [id number]       Active Meds:   Current Facility-Administered Medications   Medication Dose Route Frequency    acetaminophen (TYLENOL) tablet 650 mg  650 mg Oral Q6H PRN    aspirin chewable tablet 81 mg  81 mg Oral Daily    atorvastatin (LIPITOR) tablet 80 mg  80 mg Oral Daily    cholecalciferol (VITAMIN D3) tablet 1,000 Units  1,000 Units Oral Daily    docusate sodium (COLACE) capsule 100 mg 100 mg Oral BID PRN    fluticasone (FLOVENT HFA) 110 MCG/ACT inhaler 1 puff  1 puff Inhalation Q12H [facility]    insulin glargine (LANTUS) subcutaneous injection 25 Units  25 Units Subcutaneous Daily With Breakfast    insulin glargine (LANTUS) subcutaneous injection 25 Units  25 Units Subcutaneous HS    levothyroxine tablet 100 mcg  100 mcg Oral Early Morning    metoprolol tartrate (LOPRESSOR) tablet 100 mg  100 mg Oral Q12H [facility]    potassium chloride (K-DUR,KLOR-CON) CR tablet 10 mEq  10 mEq Oral BID    sodium chloride 0 9 % infusion  100 mL/hr Intravenous Continuous    warfarin (COUMADIN) tablet 3 mg  3 mg Oral Daily (warfarin)         VTE Pharmacologic Prophylaxis: anticoagulated on coumadin  VTE Mechanical Prophylaxis: sequential compression device    [employer]

## 2018-04-17 NOTE — PLAN OF CARE
Communication Impairment     Ability to express needs and understand communication [place]     Discharge to home or other facility with appropriate resources Progressing        INFECTION - ADULT     Absence or prevention of progression during hospitalization Progressing     Absence of fever/infection during neutropenic period Progressing        Knowledge Deficit     Patient/family/caregiver demonstrates understanding of disease process, treatment plan, medications, and discharge instructions Progressing        Neurological Deficit     Neurological status is stable or improving Progressing        Nutrition/Hydration-ADULT     Nutrient/Hydration intake appropriate for improving, restoring or maintaining nutritional needs Progressing        PAIN - ADULT     Verbalizes/displays adequate comfort level or baseline comfort level Progressing        Potential for Falls     Patient will remain free of falls Progressing        SAFETY ADULT     Maintain or return to baseline ADL function Progressing     Maintain or return mobility status to optimal level Progressing

## 2018-04-18 ENCOUNTER — APPOINTMENT (INPATIENT)
Dept: NON INVASIVE DIAGNOSTICS | Facility: HOSPITAL | Age: 78
DRG: 065 | End: 2018-04-18
Payer: MEDICARE

## 2018-04-18 PROBLEM — I48.0 PAROXYSMAL ATRIAL FIBRILLATION (HCC): Status: ACTIVE | Noted: 2018-01-15

## 2018-04-18 PROBLEM — I25.119 CORONARY ARTERY DISEASE INVOLVING NATIVE CORONARY ARTERY OF NATIVE HEART WITH ANGINA PECTORIS (HCC): Status: ACTIVE | Noted: 2017-12-19

## 2018-04-18 LAB
ANION GAP SERPL CALCULATED.3IONS-SCNC: 10 MMOL/L (ref 4–13)
BUN SERPL-MCNC: 23 MG/DL (ref 5–25)
CALCIUM SERPL-MCNC: 6.4 MG/DL (ref 8.3–10.1)
CHLORIDE SERPL-SCNC: 113 MMOL/L (ref 100–108)
CHOLEST SERPL-MCNC: 101 MG/DL (ref 50–200)
CO2 SERPL-SCNC: 19 MMOL/L (ref 21–32)
CREAT SERPL-MCNC: 1.01 MG/DL (ref 0.6–1.3)
ERYTHROCYTE [DISTWIDTH] IN BLOOD BY AUTOMATED COUNT: 19.2 % (ref 11.6–15.1)
EST. AVERAGE GLUCOSE BLD GHB EST-MCNC: 174 MG/DL
GFR SERPL CREATININE-BSD FRML MDRD: 53 ML/MIN/1.73SQ M
GLUCOSE SERPL-MCNC: 104 MG/DL (ref 65–140)
GLUCOSE SERPL-MCNC: 183 MG/DL (ref 65–140)
GLUCOSE SERPL-MCNC: 256 MG/DL (ref 65–140)
GLUCOSE SERPL-MCNC: 55 MG/DL (ref 65–140)
GLUCOSE SERPL-MCNC: 60 MG/DL (ref 65–140)
GLUCOSE SERPL-MCNC: 70 MG/DL (ref 65–140)
GLUCOSE SERPL-MCNC: 78 MG/DL (ref 65–140)
HBA1C MFR BLD: 7.7 % (ref 4.2–6.3)
HCT VFR BLD AUTO: 33.8 % (ref 34.8–46.1)
HDLC SERPL-MCNC: 35 MG/DL (ref 40–60)
HGB BLD-MCNC: 10.4 G/DL (ref 11.5–15.4)
INR PPP: 2.85 (ref 0.86–1.16)
LDLC SERPL CALC-MCNC: 46 MG/DL (ref 0–100)
MCH RBC QN AUTO: 21.2 PG (ref 26.8–34.3)
MCHC RBC AUTO-ENTMCNC: 30.8 G/DL (ref 31.4–37.4)
MCV RBC AUTO: 69 FL (ref 82–98)
PLATELET # BLD AUTO: 163 THOUSANDS/UL (ref 149–390)
POTASSIUM SERPL-SCNC: 4.4 MMOL/L (ref 3.5–5.3)
PROTHROMBIN TIME: 30.3 SECONDS (ref 12.1–14.4)
RBC # BLD AUTO: 4.91 MILLION/UL (ref 3.81–5.12)
SODIUM SERPL-SCNC: 142 MMOL/L (ref 136–145)
TRIGL SERPL-MCNC: 99 MG/DL
WBC # BLD AUTO: 5.92 THOUSAND/UL (ref 4.31–10.16)

## 2018-04-18 PROCEDURE — 83036 HEMOGLOBIN GLYCOSYLATED A1C: CPT | Performed by: PHYSICIAN ASSISTANT

## 2018-04-18 PROCEDURE — 93321 DOPPLER ECHO F-UP/LMTD STD: CPT | Performed by: INTERNAL MEDICINE

## 2018-04-18 PROCEDURE — 99232 SBSQ HOSP IP/OBS MODERATE 35: CPT | Performed by: PSYCHIATRY & NEUROLOGY

## 2018-04-18 PROCEDURE — 97535 SELF CARE MNGMENT TRAINING: CPT

## 2018-04-18 PROCEDURE — 82948 REAGENT STRIP/BLOOD GLUCOSE: CPT

## 2018-04-18 PROCEDURE — 80061 LIPID PANEL: CPT | Performed by: PHYSICIAN ASSISTANT

## 2018-04-18 PROCEDURE — 93325 DOPPLER ECHO COLOR FLOW MAPG: CPT | Performed by: INTERNAL MEDICINE

## 2018-04-18 PROCEDURE — 99233 SBSQ HOSP IP/OBS HIGH 50: CPT | Performed by: INTERNAL MEDICINE

## 2018-04-18 PROCEDURE — 85027 COMPLETE CBC AUTOMATED: CPT | Performed by: INTERNAL MEDICINE

## 2018-04-18 PROCEDURE — 93312 ECHO TRANSESOPHAGEAL: CPT

## 2018-04-18 PROCEDURE — 99232 SBSQ HOSP IP/OBS MODERATE 35: CPT | Performed by: INTERNAL MEDICINE

## 2018-04-18 PROCEDURE — 93880 EXTRACRANIAL BILAT STUDY: CPT | Performed by: SURGERY

## 2018-04-18 PROCEDURE — 85610 PROTHROMBIN TIME: CPT | Performed by: NURSE PRACTITIONER

## 2018-04-18 PROCEDURE — 97530 THERAPEUTIC ACTIVITIES: CPT

## 2018-04-18 PROCEDURE — 80048 BASIC METABOLIC PNL TOTAL CA: CPT | Performed by: INTERNAL MEDICINE

## 2018-04-18 PROCEDURE — 93312 ECHO TRANSESOPHAGEAL: CPT | Performed by: INTERNAL MEDICINE

## 2018-04-18 RX ORDER — PROPOFOL 10 MG/ML
INJECTION, EMULSION INTRAVENOUS CONTINUOUS PRN
Status: DISCONTINUED | OUTPATIENT
Start: 2018-04-18 | End: 2018-04-18 | Stop reason: SURG

## 2018-04-18 RX ORDER — SODIUM CHLORIDE 9 MG/ML
INJECTION, SOLUTION INTRAVENOUS CONTINUOUS PRN
Status: DISCONTINUED | OUTPATIENT
Start: 2018-04-18 | End: 2018-04-18 | Stop reason: SURG

## 2018-04-18 RX ORDER — KETAMINE HYDROCHLORIDE 50 MG/ML
INJECTION, SOLUTION, CONCENTRATE INTRAMUSCULAR; INTRAVENOUS AS NEEDED
Status: DISCONTINUED | OUTPATIENT
Start: 2018-04-18 | End: 2018-04-18 | Stop reason: SURG

## 2018-04-18 RX ORDER — DEXTROSE MONOHYDRATE 25 G/50ML
INJECTION, SOLUTION INTRAVENOUS
Status: COMPLETED
Start: 2018-04-18 | End: 2018-04-18

## 2018-04-18 RX ORDER — DEXTROSE MONOHYDRATE 25 G/50ML
25 INJECTION, SOLUTION INTRAVENOUS ONCE
Status: COMPLETED | OUTPATIENT
Start: 2018-04-18 | End: 2018-04-18

## 2018-04-18 RX ORDER — INSULIN GLARGINE 100 [IU]/ML
15 INJECTION, SOLUTION SUBCUTANEOUS
Status: DISCONTINUED | OUTPATIENT
Start: 2018-04-19 | End: 2018-04-19 | Stop reason: HOSPADM

## 2018-04-18 RX ORDER — INSULIN GLARGINE 100 [IU]/ML
15 INJECTION, SOLUTION SUBCUTANEOUS
Status: DISCONTINUED | OUTPATIENT
Start: 2018-04-18 | End: 2018-04-19 | Stop reason: HOSPADM

## 2018-04-18 RX ORDER — PROPOFOL 10 MG/ML
INJECTION, EMULSION INTRAVENOUS AS NEEDED
Status: DISCONTINUED | OUTPATIENT
Start: 2018-04-18 | End: 2018-04-18 | Stop reason: SURG

## 2018-04-18 RX ADMIN — ATORVASTATIN CALCIUM 80 MG: 80 TABLET, FILM COATED ORAL at 09:25

## 2018-04-18 RX ADMIN — PROPOFOL 40 MG: 10 INJECTION, EMULSION INTRAVENOUS at 13:42

## 2018-04-18 RX ADMIN — ASPIRIN 81 MG 81 MG: 81 TABLET ORAL at 09:25

## 2018-04-18 RX ADMIN — SODIUM CHLORIDE: 0.9 INJECTION, SOLUTION INTRAVENOUS at 13:33

## 2018-04-18 RX ADMIN — FLUTICASONE PROPIONATE 1 PUFF: 110 AEROSOL, METERED RESPIRATORY (INHALATION) at 22:08

## 2018-04-18 RX ADMIN — METOPROLOL TARTRATE 100 MG: 50 TABLET ORAL at 22:05

## 2018-04-18 RX ADMIN — WARFARIN SODIUM 3 MG: 3 TABLET ORAL at 17:01

## 2018-04-18 RX ADMIN — DEXTROSE MONOHYDRATE 25 ML: 25 INJECTION, SOLUTION INTRAVENOUS at 06:45

## 2018-04-18 RX ADMIN — PROPOFOL 30 MCG/KG/MIN: 10 INJECTION, EMULSION INTRAVENOUS at 13:43

## 2018-04-18 RX ADMIN — LEVOTHYROXINE SODIUM 100 MCG: 100 TABLET ORAL at 05:29

## 2018-04-18 RX ADMIN — KETAMINE HYDROCHLORIDE 30 MG: 50 INJECTION, SOLUTION INTRAMUSCULAR; INTRAVENOUS at 13:42

## 2018-04-18 RX ADMIN — VITAMIN D, TAB 1000IU (100/BT) 1000 UNITS: 25 TAB at 09:25

## 2018-04-18 RX ADMIN — METOPROLOL TARTRATE 100 MG: 50 TABLET ORAL at 09:25

## 2018-04-18 RX ADMIN — INSULIN GLARGINE 15 UNITS: 100 INJECTION, SOLUTION SUBCUTANEOUS at 22:05

## 2018-04-18 RX ADMIN — FLUTICASONE PROPIONATE 1 PUFF: 110 AEROSOL, METERED RESPIRATORY (INHALATION) at 09:27

## 2018-04-18 NOTE — PLAN OF CARE
Problem: OCCUPATIONAL THERAPY ADULT  Goal: Performs self-care activities at highest level of function for planned discharge setting  See evaluation for individualized goals  Treatment Interventions: ADL retraining, Functional transfer training, Endurance training, Patient/family training, Equipment evaluation/education, Compensatory technique education, Continued evaluation, Energy conservation (cog assess/tx)          See flowsheet documentation for full assessment, interventions and recommendations  Outcome: Progressing  Limitation: Decreased ADL status, Decreased Safe judgement during ADL, Decreased cognition, Decreased endurance, Decreased self-care trans, Decreased high-level ADLs  Prognosis: Good, Fair  Assessment: Pt  seen for OT treatment session to address UB/LB dressing, grooming while standing at sink, toileting, mobility, transfers and cognitive assessment  Pt  Completed the ACLs and scored a 3 8/6 0  Based on results, it is rec that pt  Have 24hr S/A to gather supplies needed for ADLs, to check results and to remove dangerous objects  During ACLS the following behavior was noted: unable to correct errors, increased distractibility, decreased problem solving of errors  Pt  functioning at a min A-(S) level for functional mobility and a (s) level for transfers with the use of Rw  (s) is needed for mobility and transfers as pt  Requires v c  For safe RW use  Additionally, min A was needed as distance ambulated increased  Pt  Mobilized for 170' at a however at ~140 f  Min A was needed as fatigue increased  Pt  Also Required 4 standing rest breaks as pt  Displayed: decreased functional activity tolerance, generalized weakness, deconditioning, +IGNACIO  Important to note that during toilet transfer pt  Made attempts to pull on RW to stand and required v c  To use GB  Rec  Pt  Utilize BSC on top of toilet at home to raise height and provide rails   Pt  Able to complete toileting, LB dressing and grooming at a (s) level as balance deficits were noted however no physical A was needed  During UB dressing, Pt  Threaded RUE between gown snaps  Pt  Cassandra Conroy to recognize or correct errors once pointed out and thus required min A to correct  This is consistent with performance on ACLS measure  Pt  will continue to benefit from OT services to address noted deficits and maximize functional gains  Rec  Remains STR vs  Home with 24 hour S/A       OT Discharge Recommendation: Short Term Rehab (vs  home with 24 hr S/A and home OT)  OT - OK to Discharge: Yes (to STR at this time)    Time: 5936-0491

## 2018-04-18 NOTE — PROGRESS NOTES
Progress Note - Cardiology   Judi Lopez [de-identified] y o  female MRN: [id number]  Unit/Bed#: [facility] 726-01 Encounter: [id number]    Assessment:  Principal Problem:    CVA (cerebral vascular accident) [facility])  Active Problems:    Essential hypertension    CAD (coronary artery disease)    Type 2 diabetes mellitus with complication, with long-term current use of insulin (HCC)    Hyperlipidemia    Gastroesophageal reflux disease without esophagitis    Moderate mitral stenosis    LEONARD (acute kidney injury) ([address] )    Asymptomatic carotid artery stenosis, bilateral    Atherosclerosis of artery of extremity with ulceration (HCC)    Hx of CABG    Migraine headache    Persistent atrial fibrillation (HCC)    Ulcer of toe of left foot (HCC)    Ulcer of toe of right foot (HCC)    Left bundle branch block (LBBB)    1st degree AV block    S/P TAVR (transcatheter aortic valve replacement)    Expressive aphasia    [de-identified]year old female with AS s/p TAVR on 4/3/2016  PAF was on warfarin  CAD, DM, carotid disease  Comes with CVA, felt to be embolic  Unclear if related to PAF, or post TAVR  Plan: For ROBB today, but regardless of findings with PAF known and labile INR, I think use of a direct oral anticoagulant (Eliquis, Xarelto, Pradaxa) would be a good idea to avoid significant fluctuations  The cost of these will need to be checked  She does not have a mechanical valve, and although she has moderate AS, I would not consider her afib valvular  Additionally, the procedure itself could result in CVA, although the symptoms were not present immediately after the surgery  On tele, she has a very long first degree AV block and LBBB which are known  There was a brief episode of wenckebach, but no higher degree AV block is seen  Subjective/Objective     Subjective:  No events overnight  She is to have ROBB performed today, but will need anticoagulation regardless    Her INRs were somewhat labile, additionally in the setting of being held for procedures, etc with recent TAVR  Objective:    Vitals: /52 (BP Location: Left arm)   Pulse 69   Temp 98 1 °F (36 7 °C) (Oral)   Resp 18   Ht 5' 4" (1 626 m)   Wt 76 2 kg (167 lb 15 9 oz)   LMP  (LMP Unknown)   SpO2 96%   BMI 28 84 kg/m²   Vitals:    04/16/18 2020   Weight: 76 2 kg (167 lb 15 9 oz)     Orthostatic Blood Pressures    Flowsheet Row Most Recent Value   Blood Pressure  116/52 filed at 04/18/2018 1059   Patient Position - Orthostatic VS  Sitting filed at 04/18/2018 1059          Intake/Output Summary (Last 24 hours) at 04/18/18 1336  Last data filed at 04/18/18 0742   Gross per 24 hour   Intake              400 ml   Output              900 ml   Net             -500 ml     Physical Exam:   General appearance: Sitting in chair  NAD  Head: Normocephalic, without obvious abnormality, atraumatic  Lungs: clear to auscultation bilaterally  Normal air entry  Normal effort  Heart: regular  + JAMAAL    Abdomen: soft, non-tender; bowel sounds normal; no masses,  no organomegaly  Extremities: no edema  Skin: Skin color, texture, turgor normal  No rashes or lesions    Medications:    Current Facility-Administered Medications:     acetaminophen (TYLENOL) tablet 650 mg, 650 mg, Oral, Q6H PRN, Claryce Dora    aspirin chewable tablet 81 mg, 81 mg, Oral, Daily, Claryce Dora, 81 mg at 04/18/18 0925    atorvastatin (LIPITOR) tablet 80 mg, 80 mg, Oral, Daily, Claryce Dora, 80 mg at 04/18/18 8009    cholecalciferol (VITAMIN D3) tablet 1,000 Units, 1,000 Units, Oral, Daily, Claryce Dora, 1,000 Units at 04/18/18 0925    docusate sodium (COLACE) capsule 100 mg, 100 mg, Oral, BID PRN, Claryce Dora    fluticasone (FLOVENT HFA) 110 MCG/ACT inhaler 1 puff, 1 puff, Inhalation, Q12H [facility], Claryce Dora, 1 puff at 04/18/18 0927    [START ON 4/19/2018] insulin glargine (LANTUS) subcutaneous injection 15 Units, 15 Units, Subcutaneous, Daily With Breakfast, Bharti John    insulin glargine (LANTUS) subcutaneous injection 15 Units, 15 Units, Subcutaneous, HS, Sergio Ores    levothyroxine tablet 100 mcg, 100 mcg, Oral, Early Morning, Sergio Ores, 100 mcg at 04/18/18 0529    metoprolol tartrate (LOPRESSOR) tablet 100 mg, 100 mg, Oral, Q12H [facility], Sergio Ores, 100 mg at 04/18/18 3786    warfarin (COUMADIN) tablet 3 mg, 3 mg, Oral, Daily (warfarin), Sergio Ores, 3 mg at 04/17/18 1807    Lab Results:    Results from last 7 days  Lab Units 04/16/18  1326   TROPONIN I ng/mL 0 06*       Results from last 7 days  Lab Units 04/18/18  1043 04/17/18  0447 04/16/18  1326   WBC Thousand/uL 5 92 5 26 6 10   HEMOGLOBIN g/dL 10 4* 9 3* 10 6*   HEMATOCRIT % 33 8* 29 9* 33 7*   PLATELETS Thousands/uL 163 140* 172       Results from last 7 days  Lab Units 04/18/18  0452   CHOLESTEROL mg/dL 101   TRIGLYCERIDES mg/dL 99   HDL mg/dL 35*       Results from last 7 days  Lab Units 04/18/18  0452 04/17/18  0447 04/16/18  1326   SODIUM mmol/L 142 139 138   POTASSIUM mmol/L 4 4 3 8 4 5   CHLORIDE mmol/L 113* 107 103   CO2 mmol/L 19* 23 24   BUN mg/dL 23 36* 47*   CREATININE mg/dL 1 01 1 07 1 48*   CALCIUM mg/dL 6 4* 6 6* 7 1   TOTAL PROTEIN g/dL  --  6 4 7 4   BILIRUBIN TOTAL mg/dL  --  0 50 0 36   ALK PHOS U/L  --  60 73   ALT U/L  --  21 28   AST U/L  --  38 48*   GLUCOSE RANDOM mg/dL 60* 80 105       Results from last 7 days  Lab Units 04/18/18  0452 04/16/18  1326   INR  2 85* 3 14*   PTT seconds  --  47*         Telemetry: Personally reviewed  Brief PSVT  Echo:  LEFT VENTRICLE:  Systolic function was at the lower limits of normal  Ejection fraction was estimated to be 50 %  Although no diagnostic regional wall motion abnormality was identified, this possibility cannot be completely excluded on the basis of this study  Wall thickness was moderately increased    There was moderate concentric hypertrophy      LEFT ATRIUM:  The atrium was moderately to markedly dilated      RIGHT ATRIUM:  The atrium was moderately dilated      MITRAL VALVE:  There was marked annular calcification  There was moderate diffuse thickening of the anterior and posterior leaflets, involving the leaflet from base to margin  There was moderately reduced leaflet separation  Transmitral velocity was increased due to valvular stenosis  There was moderate stenosis  There was mild regurgitation  Mean transmitral gradient was 9 mmHg      AORTIC VALVE:  A transcatheter bioprosthesis (#26 [address] 3 TAVR) was present  The prosthesis was well-seated without stenosis or regurgitation  Peak and mean velocities (gradients) were 1 79 (13) and 1 14 (6) m/sec (mmHg), respectively  The  calculated aortic valve area was 1 6 cm2 using continuity equation        TRICUSPID VALVE:  There was moderate regurgitation  Estimated peak PA pressure was 56 mmHg  The findings suggest moderate pulmonary hypertension      PULMONIC VALVE:  There was trace regurgitation

## 2018-04-18 NOTE — PROGRESS NOTES
IM Residency Progress Note   Unit/Bed#: [facility] 726-01 Encounter: [id number]  SOD Team C       Salvador Shah [de-identified] y o  female [id number]    Hospital Stay Days: 2      Assessment/Plan:    Principal Problem:    CVA (cerebral vascular accident) [facility])  Active Problems:    Essential hypertension    CAD (coronary artery disease)    Type 2 diabetes mellitus with complication, with long-term current use of insulin (HCC)    Hyperlipidemia    Gastroesophageal reflux disease without esophagitis    Moderate mitral stenosis    LEONARD (acute kidney injury) ([facility] [address] )    Asymptomatic carotid artery stenosis, bilateral    Atherosclerosis of artery of extremity with ulceration (HCC)    Hx of CABG    Migraine headache    Persistent atrial fibrillation (HCC)    Ulcer of toe of left foot (HCC)    Ulcer of toe of right foot (HCC)    Left bundle branch block (LBBB)    1st degree AV block    S/P TAVR (transcatheter aortic valve replacement)    Expressive aphasia      CVA  -MRI with several small, likely embolic regions  -Patient is not below therapeutic range of INR on coumadin   -Neuro checks Q4H  -Will not use stroke pathway due to subacute presentation  -Continue ASA, high intensity statin  -ROBB planned for today  -Neurology following      Elevated troponin  -Significance unclear given recent cardiac procedure and tachycardia with known CAD  -Clinically stable  -Continue to monitor with telemetry      Acute kidney injury  -Likely prerenal with volume contraction on exam   -Improving with normal saline at 100cc/hr   -Hold ACEI  -Trend Cr      Chronic heart failure with AS s/p TAVR  -Patient on 40mg BID furosemide at home  -Appears volume contracted on exam, likely iatrogenic  -Consult cardiology      IDDM2  -Diabetic diet   -On 30u BID basal insulin with fair control at home at 7 5%  -Due to hypotension decrease to 15u BID basal insulin with QID fingerstick  -Hold metformin with LEONARD    -Goal blood sugar < 180 in hospital      CAD s/p CABG  -Continue ASA, high intensity statin     Atrial fibrillation/flutter  -Continue rate control strategy and anticoagulation with coumadin      Asthma  -Continue inhaled steroid  -beta agonist therapy PRN  Disposition: ROBB, PT/OT  Subjective:   Patient with low blood sugar overnight per nursing staff  Patient continues to have aphasia this morning  She otherwise has few complaints  Mild persistent headache  Vitals: Temp (24hrs), Av 2 °F (36 8 °C), Min:97 5 °F (36 4 °C), Max:98 9 °F (37 2 °C)  Current: Temperature: 98 3 °F (36 8 °C)  Vitals:    18 1527 18 1917 18 2345 18 0741   BP: 123/68 112/76 97/65 133/65   BP Location: Right arm Left arm Left arm Left arm   Pulse: 100 88 76 92   Resp:    Temp: 97 5 °F (36 4 °C) 98 2 °F (36 8 °C) 98 1 °F (36 7 °C) 98 3 °F (36 8 °C)   TempSrc: Oral Oral Oral Oral   SpO2: 99% 98% 96% 99%   Weight:       Height:        Body mass index is 28 84 kg/m²  I/O last 24 hours: In: 2500 [P O :1080; I V :1420]  Out: 1700 [Urine:1700]      Physical Exam:   Gen: In chair, NAD  HEENT: No injection, membranes moist   Extr: No LE edema  Neuro: Spontaneously moving all extremities      Invasive Devices     Peripheral Intravenous Line            Peripheral IV 18 Left Forearm 2 days                      Labs:   Recent Results (from the past 24 hour(s))   Fingerstick Glucose (POCT)    Collection Time: 18 10:44 AM   Result Value Ref Range    POC Glucose 167 (H) 65 - 140 mg/dl   Fingerstick Glucose (POCT)    Collection Time: 18  4:06 PM   Result Value Ref Range    POC Glucose 138 65 - 140 mg/dl   Fingerstick Glucose (POCT)    Collection Time: 18  9:34 PM   Result Value Ref Range    POC Glucose 159 (H) 65 - 140 mg/dl   Protime-INR    Collection Time: 18  4:52 AM   Result Value Ref Range    Protime 30 3 (H) 12 1 - 14 4 seconds    INR 2 85 (H) 0 86 - 7 75   Basic metabolic panel    Collection Time: 18 4:52 AM   Result Value Ref Range    Sodium 142 136 - 145 mmol/L    Potassium 4 4 3 5 - 5 3 mmol/L    Chloride 113 (H) 100 - 108 mmol/L    CO2 19 (L) 21 - 32 mmol/L    Anion Gap 10 4 - 13 mmol/L    BUN 23 5 - 25 mg/dL    Creatinine 1 01 0 60 - 1 30 mg/dL    Glucose 60 (L) 65 - 140 mg/dL    Calcium 6 4 (L) 8 3 - 10 1 mg/dL    eGFR 53 ml/min/1 73sq m   HEMOGLOBIN A1C W/ EAG ESTIMATION    Collection Time: 04/18/18  4:52 AM   Result Value Ref Range    Hemoglobin A1C 7 7 (H) 4 2 - 6 3 %     mg/dl   Lipid Panel with Direct LDL reflex    Collection Time: 04/18/18  4:52 AM   Result Value Ref Range    Cholesterol 101 50 - 200 mg/dL    Triglycerides 99 <=150 mg/dL    HDL, Direct 35 (L) 40 - 60 mg/dL    LDL Calculated 46 0 - 100 mg/dL   Fingerstick Glucose (POCT)    Collection Time: 04/18/18  6:36 AM   Result Value Ref Range    POC Glucose 55 (L) 65 - 140 mg/dl   Fingerstick Glucose (POCT)    Collection Time: 04/18/18  7:45 AM   Result Value Ref Range    POC Glucose 104 65 - 140 mg/dl       Radiology Results: I have personally reviewed pertinent reports  and I have personally reviewed pertinent films in PACS  Xr Chest 2 Views    Result Date: 4/16/2018  Impression: No acute cardiopulmonary disease  Workstation performed: [id number]     Ct Head Without Contrast    Result Date: 4/16/2018  Impression: No acute intracranial abnormality  Age related atrophy and findings most compatible with chronic small vessel ischemic disease  Workstation performed: [id number]     Mri Brain Wo Contrast    Result Date: 4/17/2018  Impression: 1   4 punctate foci of diffusion restriction indicative of scattered tiny acute/recent lacunar infarctions in 4 distinct vascular territories implicating a central embolic source  Does the patient have atrial fibrillation? 2  Advanced, chronic microangiopathy   Workstation performed: [id number]       Active Meds:   Current Facility-Administered Medications   Medication Dose Route Frequency    acetaminophen (TYLENOL) tablet 650 mg  650 mg Oral Q6H PRN    aspirin chewable tablet 81 mg  81 mg Oral Daily    atorvastatin (LIPITOR) tablet 80 mg  80 mg Oral Daily    cholecalciferol (VITAMIN D3) tablet 1,000 Units  1,000 Units Oral Daily    docusate sodium (COLACE) capsule 100 mg  100 mg Oral BID PRN    fluticasone (FLOVENT HFA) 110 MCG/ACT inhaler 1 puff  1 puff Inhalation Q12H [facility]    insulin glargine (LANTUS) subcutaneous injection 20 Units  20 Units Subcutaneous Daily With Breakfast    insulin glargine (LANTUS) subcutaneous injection 20 Units  20 Units Subcutaneous HS    levothyroxine tablet 100 mcg  100 mcg Oral Early Morning    metoprolol tartrate (LOPRESSOR) tablet 100 mg  100 mg Oral Q12H [facility]    warfarin (COUMADIN) tablet 3 mg  3 mg Oral Daily (warfarin)         VTE Pharmacologic Prophylaxis: anticoagulated on coumadin  VTE Mechanical Prophylaxis: sequential compression device    Pia Cordero

## 2018-04-18 NOTE — RESTORATIVE TECHNICIAN NOTE
Restorative Specialist Mobility Note       Activity: Ambulate in morrison, Ambulate in room, Bathroom privileges, Stand at bedside, Chair (Educated/encouraged pt to ambulate with assistance 3-4 x's/day  Chair alarm on   Pt callbell, phone/tray within reach )     Assistive Device: Front wheel walker          Marissa PERALTA, Restorative Technician, [employer]

## 2018-04-18 NOTE — PLAN OF CARE
INFECTION - ADULT     Absence of fever/infection during neutropenic period Completed          Communication Impairment     Ability to express needs and understand communication [place]     Discharge to home or other facility with appropriate resources Progressing        DISCHARGE PLANNING - CARE MANAGEMENT     Discharge to post-acute care or home with appropriate resources Progressing        INFECTION - ADULT     Absence or prevention of progression during hospitalization Progressing        Knowledge Deficit     Patient/family/caregiver demonstrates understanding of disease process, treatment plan, medications, and discharge instructions Progressing        Neurological Deficit     Neurological status is stable or improving Progressing        Nutrition/Hydration-ADULT     Nutrient/Hydration intake appropriate for improving, restoring or maintaining nutritional needs Progressing        PAIN - ADULT     Verbalizes/displays adequate comfort level or baseline comfort level Progressing        Potential for Falls     Patient will remain free of falls Progressing        SAFETY ADULT     Maintain or return to baseline ADL function Progressing     Maintain or return mobility status to optimal level Progressing

## 2018-04-18 NOTE — PHYSICAL THERAPY NOTE
Attempted pt x 3 this AM, and declined all 3 attempts due to anxiety  Will follow    Sera Carr PT, DPT CSRS

## 2018-04-18 NOTE — ANESTHESIA PREPROCEDURE EVALUATION
Review of Systems/Medical History  Patient summary reviewed  Chart reviewed  No history of anesthetic complications     Cardiovascular  EKG reviewed, Exercise tolerance: poor,  Hyperlipidemia, Hypertension , Valve replacement (TAVR 4/3/18) aortic valve  replacement, Past MI , CAD, , Dysrhythmias, atrial fibrillation and atrial flutter, CHF (LVEF 35% pre-TAVR, post TAVR LVEF 50%) ,   Comment: Bilateral internal carotid stenosis, Pulmonary hypertension (moderate) Pulmonary  Not a smoker , Asthma: , No recent URI ,        GI/Hepatic    GERD well controlled,   Comment: Confirmed NPO appropriate  IBS     Kidney stones,        Endo/Other  Diabetes (Hgb A1c 7 7) poorly controlled type 2 Insulin, History of thyroid disease (multinodular goiter) ,      GYN  Negative gynecology ROS          Hematology    Coagulation disorder (coumadin) currently taking oral anticoagulants,    Musculoskeletal  Back pain , Osteoarthritis,   Arthritis     Neurology    TIA, CVA (expressive aphasia with MRI 4/17 showing multiple embolic appearing infarcts s/p TAVR 4/3) , acute, Headaches,    Psychology   Negative psychology ROS              Physical Exam    Airway    Mallampati score: II  TM Distance: >3 FB  Neck ROM: full     Dental   upper dentures and lower dentures,     Cardiovascular  Rhythm: regular, Rate: normal,     Pulmonary  Pulmonary exam normal Breath sounds clear to auscultation,     Other Findings  AOx3, moving all four extremities to command      Anesthesia Plan  ASA Score- 3     Anesthesia Type- IV sedation with anesthesia with ASA Monitors  Additional Monitors:   Airway Plan:     Comment: I discussed the risks and benefits of IV sedation anesthesia including the possibility of the need to convert to general anesthesia and the potential risk of awareness  The patient was given the opportunity to ask questions, which were answered        Plan Factors-    Induction- intravenous      Postoperative Plan-     Informed Consent- Anesthetic plan and risks discussed with patient  TTE 4/4/18:  SUMMARY  LEFT VENTRICLE:  Systolic function was at the lower limits of normal  Ejection fraction was estimated to be 50 %  Although no diagnostic regional wall motion abnormality was identified, this possibility cannot be completely excluded on the basis of this study  Wall thickness was moderately increased  There was moderate concentric hypertrophy  LEFT ATRIUM:  The atrium was moderately to markedly dilated  RIGHT ATRIUM:  The atrium was moderately dilated  MITRAL VALVE:  There was marked annular calcification  There was moderate diffuse thickening of the anterior and posterior leaflets, involving the leaflet from base to margin  There was moderately reduced leaflet separation  Transmitral velocity was increased due to valvular stenosis  There was moderate stenosis  There was mild regurgitation  Mean transmitral gradient was 9 mmHg  AORTIC VALVE:  A transcatheter bioprosthesis (#26 [address] 3 TAVR) was present  The prosthesis was well-seated without stenosis or regurgitation  Peak and mean velocities (gradients) were 1 79 (13) and 1 14 (6) m/sec (mmHg), respectively  The  calculated aortic valve area was 1 6 cm2 using continuity equation  TRICUSPID VALVE:  There was moderate regurgitation  Estimated peak PA pressure was 56 mmHg  The findings suggest moderate pulmonary hypertension  PULMONIC VALVE:  There was trace regurgitation        Lab Results   Component Value Date    WBC 5 92 04/18/2018    HGB 10 4 (L) 04/18/2018    HCT 33 8 (L) 04/18/2018    MCV 69 (L) 04/18/2018     04/18/2018     Lab Results   Component Value Date    GLUCOSE 60 (L) 04/18/2018    CALCIUM 6 4 (L) 04/18/2018     04/18/2018    K 4 4 04/18/2018    CO2 19 (L) 04/18/2018     (H) 04/18/2018    BUN 23 04/18/2018    CREATININE 1 01 04/18/2018     Lab Results   Component Value Date    ALT 21 04/17/2018    AST 38 04/17/2018    ALKPHOS 60 04/17/2018    BILITOT 0 50 04/17/2018     Lab Results   Component Value Date    INR 2 85 (H) 04/18/2018    INR 3 14 (H) 04/16/2018    INR 1 37 (H) 04/06/2018    PROTIME 30 3 (H) 04/18/2018    PROTIME 32 7 (H) 04/16/2018    PROTIME 16 9 (H) 04/06/2018     Lab Results   Component Value Date    HGBA1C 7 7 (H) 04/18/2018

## 2018-04-18 NOTE — OCCUPATIONAL THERAPY NOTE
OccupationalTherapy Progress Note     Patient Name: Vilma Kearney  [id number] Date: 4/18/2018  Problem List  Patient Active Problem List   Diagnosis    Essential hypertension    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 native heart with angina pectoris ([facility] )    Type 2 diabetes mellitus with complication, with long-term current use of insulin ([facility])    Atrial flutter (HCC)    Hyperlipidemia    Gastroesophageal reflux disease without esophagitis    Moderate mitral stenosis    LEONARD (acute kidney injury) ([facility] )    Arthritis of hand    Asthma    Asymptomatic carotid artery stenosis, bilateral    Atherosclerosis of artery of extremity with ulceration ([facility] )    Hx of CABG    Chronic anticoagulation    Chronic diastolic congestive heart failure (HCC)    Chronic systolic heart failure (HCC)    Degenerative joint disease involving multiple joints    Diabetic retinopathy (HCC)    Postoperative hypothyroidism    Migraine headache    Nephrolithiasis    Osteoarthritis of both knees    Paroxysmal atrial fibrillation (HCC)    Ulcer of toe of left foot (HCC)    Ulcer of toe of right foot (HCC)    Prolonged Q-T interval on ECG    Acute respiratory insufficiency, postoperative    Left bundle branch block (LBBB)    1st degree AV block    S/P TAVR (transcatheter aortic valve replacement)    Expressive aphasia    CVA (cerebral vascular accident) ([place] [address] )        04/18/18 1052   Restrictions/Precautions   Weight Bearing Precautions Per Order No   Other Precautions Cognitive; Chair Alarm; Fall Risk  (alarm active at end of session)   Pain Assessment   Pain Assessment No/denies pain   ADL   Grooming Assistance 5  Supervision/Setup   Grooming Deficit Supervision/safety;Standing with assistive device; Wash/dry hands   UB Dressing Assistance 4  Minimal Assistance   UB Dressing Deficit Setup; Thread RUE   LB Dressing Assistance 5  Supervision/Setup   LB Dressing Deficit Setup;Supervision/safety; Increased time to complete; Don/doff R sock; Don/doff L sock; Thread RLE into pants; Thread LLE into pants;Pull up over hips   Toileting Assistance  5  Supervision/Setup   Toileting Deficit Setup;Supervison/safety; Increased time to complete;Grab bar use;Clothing management up;Clothing management down;Perineal hygiene   Transfers   Sit to Stand 5  Supervision   Additional items Verbal cues   Stand to Sit 5  Supervision   Additional items Verbal cues   Toilet transfer 5  Supervision   Additional items Verbal cues;Standard toilet  (GB)   Functional Mobility   Functional Mobility 4  Minimal assistance   Additional Comments 170', wit 4 rest breaks, v c  needed to initiate rest breaks   Additional items Rolling walker   Cognition   Overall Cognitive Status Impaired   Arousal/Participation Alert; Cooperative   Attention Attends with cues to redirect   Memory Decreased short term memory;Decreased recall of precautions   Following Commands Follows one step commands without difficulty   Activity Tolerance   Activity Tolerance Patient limited by fatigue   Medical Staff Made Aware yes, TOMY Miles cleared for session   Assessment   Assessment Pt  seen for OT treatment session to address UB/LB dressing, grooming while standing at sink, toileting, mobility, transfers and cognitive assessment  Pt  Completed the ACLs and scored a 3 8/6 0  Based on results, it is rec that pt  Have 24hr S/A to gather supplies needed for ADLs, to check results and to remove dangerous objects  During ACLS the following behavior was noted: unable to correct errors, increased distractibility, decreased problem solving of errors  Pt  functioning at a min A-(S) level for functional mobility and a (s) level for transfers with the use of Rw  (s) is needed for mobility and transfers as pt  Requires v c  For safe RW use  Additionally, min A was needed as distance ambulated increased  Pt  Mobilized for 170' at a however at ~140 f  Min A was needed as fatigue increased  Pt   Also Required 4 standing rest breaks as pt  Displayed: decreased functional activity tolerance, generalized weakness, deconditioning, +IGNACIO  Important to note that during toilet transfer pt  Made attempts to pull on RW to stand and required v c  To use GB  Rec  Pt  Utilize BSC on top of toilet at home to raise height and provide rails  Pt  Able to complete toileting, LB dressing and grooming at a (s) level as balance deficits were noted however no physical A was needed  During UB dressing, Pt  Threaded RUE between gown snaps  Pt  [place] to recognize or correct errors once pointed out and thus required min A to correct  This is consistent with performance on ACLS measure  Pt  will continue to benefit from OT services to address noted deficits and maximize functional gains  Rec  Remains STR vs  Home with 24 hour S/A  Plan   Treatment Interventions ADL retraining;Functional transfer training; Endurance training;Patient/family training;Equipment evaluation/education; Compensatory technique education;Continued evaluation; Energy conservation  (cog  assess and tx )   Goal Expiration Date 04/27/18   OT Frequency 3-5x/wk   Recommendation   OT Discharge Recommendation Short Term Rehab  (vs  home with 24 hr S/A and home OT)   Equipment Recommended Bedside commode   OT - OK to Discharge Yes  (to STR at this time)   Barthel Index   Feeding 10   Bathing 0   Grooming Score 5   Dressing Score 5   Bladder Score 10   Bowels Score 10   Toilet Use Score 10   Transfers (Bed/Chair) Score 10   Mobility (Level Surface) Score 10   Stairs Score 0   Barthel Index Score 70   Modified Kalida Scale   Modified Kalida Scale 3      3 8    Administered Miguel Cognitive Level Screen (ACLS)  PT scored 3 8/6 0 indicating 24 house supervision is recommended to gather supplies needed for ADLs, to check results and to remove dangerous objects  Behavior:  May not ask for assistance  May abandon tasks    May be disoriented to day and date and will likely not retain information when told  Needs frequent redirection to tasks  Pace is slow & univariant  May insist on engaging in activity with potential harm (ie: driving)  Grooming:  Nelson, brushes hair or shaves until all areas are covered  Recognizes completion of tasks  May attempt to style hair but may miss back of head  May use all available products  Recommend checking every few minutes if left alone  Dressing:  May learn to dress at a particular time of the day  Dons common articles of clothing slowly with some errors in sequence  Recognizes when dressing is completed  May stop when a problem occurs  May argue with caregiver suggestions to change selections  Bathing:  Covers all visible body parts with soap and rinses it off  May forget hidden parts or steps  Is aware of task completion  Expect waste if allowed to measure amounts  Do not leave unsupervised for more than a few minutes  Walking/Exercising:  Ambulates within familiar living area or short distances like the backyard  May be unable to retrace steps if lost   May run/walk until tired or stopped  Impulsive actions may result in falls  Eating:   May learn to present self at dining area at regular meal times or follow routine for passing dishes  Does not engage in social conversation at the table  Check food and beverage temperatures  Cannot follow dietary restrictions  Toileting:  May complete sequence of toileting with 1 or 2 steps left out  May forget to zip pants or wash hands  Medications: May be trained to take medications at a particular time of the day  May be able to tell when medications have been altered  Store medications in secure location away from the individual in child proof containers  Supervise to ensure compliance  Use of adaptive devices:  May need frequent redirection for safe carryover in use of assistive device  Do not expect to recall safety precautions    Housekeeping:  No awareness of need for housekeeping  May follow directed sequence of actions like putting away dishes  Do not expect effective results  Food Preparation:  Does not plan for food  May be trained not to eat from refrigerator or present self in dining area  May be able to make a simple sandwich with 1-2 ingredients  Watch for distractibility  Restrict access to sharp tools  Spending money:  May be trained to do the simple steps in a transaction with a fixed amount of money  Recognizes the completion of transaction  May be trained to ask for assistance  If given money, may give away or lose it  Shopping: Follows a guide to shopping areas  Looks into windows or at displays with no intent to purchase  May take items without paying  May fail to notice price or if they have enough money to pay  Do not leave the individual unsupervised in shopping areas  Laundry:   May fold all available items or drape all laundry on clothes line without redirection  Traveling: Able to sit in car for periods up to 1 hour  May express awareness of destination after arrival   Accompany individual on all trips  Telephoning:  May be trained to complete a simple telephone call involving a new number and procedure  May be unable to determine when to call  Does not  when emergency exists and may call 911 at inappropriate times  Driving:  Should not operate a motor vehicle      Time: [address], MOT, OTR/L

## 2018-04-18 NOTE — SOCIAL WORK
Met with pt to discuss her discharge plan  Informed her of therapy's recommendation for home therapy  Pt stated that she would like to discuss home therapy with her family

## 2018-04-18 NOTE — ANESTHESIA POSTPROCEDURE EVALUATION
Post-Op Assessment Note      CV Status:  Stable    Mental Status:  Alert and awake    Hydration Status:  Stable    PONV Controlled:  Controlled    Airway Patency:  Patent    Post Op Vitals Reviewed: Yes          Staff: AnesthesiologistCAMERON           BP   113/56   Temp      Pulse  80   Resp   18   SpO2   99

## 2018-04-18 NOTE — CONSULTS
Progress Note - Neurology   Sera Yun [de-identified] y o  female MRN: [id number]  [facility] Encounter: [id number]   This is a progress noted not a new consult  Assessment:  57-year-old female with past medical history of recent aortic valve replacement for aortic stenosis, atrial fibrillation/AF flutter anticoagulated with Coumadin, hypertension, hyperlipidemia, diabetes, CHF, PAD, presenting with several days duration of dysarthric speech and word-finding difficulty  MRI brain as documented below demonstrates bihemispheric multi vascular territory punctate infarctions with concern for central embolic source  Neurological examination today is stable      Plan:  1  Stroke pathway: MRI brain revealing several embolic appearing infarctions (multiple vascular distributions/bihemispheric)              -Will carotid dopplers - less than 50% bilaterally              -Recent Echo performed on 4/4/18, no need to repeat (EF 50%, moderate atrial dilation bilaterally              - ROBB to evaluate for intracardiac thrombus given new embolic infarctions and recent TAVR  2  Likely will anticipate transition to NOAC (likely Eliquis) given stroke on supratherapeutic Coumadin  Continuing aspirin with cardiac history and high dose statin  3   Telemetry monitoring  4   Frequent neuro checks  5   Stroke education provided  6   PT/OT evaluations  7   Discussed plan of care with attending neurologist   Will continue to monitor neurologic status throughout workup  Subjective:   No events overnight, ROBB is planned for 1:00 p m  Today  She did report a mild headache and this a m  which has improved, she does report she has some disturbance in her speech - described as hesitancy, she denies any one-sided weakness or paresthesias or ataxia  ROS:  Review of symptoms are negative except for above  Vitals: Blood pressure 133/65, pulse 92, temperature 98 3 °F (36 8 °C), temperature source Oral, resp   rate 18, height 5' 4" (1 626 m), weight 76 2 kg (167 lb 15 9 oz), SpO2 99 %, not currently breastfeeding  ,Body mass index is 28 84 kg/m²  Current Facility-Administered Medications:  acetaminophen 650 mg Oral Q6H PRN Jasson Chroman   aspirin 81 mg Oral Daily Jasson Chroman   atorvastatin 80 mg Oral Daily Brian Branch   cholecalciferol 1,000 Units Oral Daily Brian Branch   docusate sodium 100 mg Oral BID PRN Algis Tobar Branch   fluticasone 1 puff Inhalation Q12H [facility] Jassonkia Bliss   [START ON 4/19/2018] insulin glargine 15 Units Subcutaneous Daily With Breakfast Brian Branch   insulin glargine 15 Units Subcutaneous HS Jassonkia Bliss   levothyroxine 100 mcg Oral Early Morning Brian Branch   metoprolol tartrate 100 mg Oral Q12H [facility] Jassonkia Bliss   warfarin 3 mg Oral Daily (warfarin) Jassonkia Bliss       Physical Exam:     Constitutional - in NAD  HEENT - NC/AT, no icterus noted, conjuctiva clear, oral mucosa free of exudate  Cardiac - + murmur noted, rate regular  Lungs - Clear to auscultation bilaterally, minimal wheeze noted right lung base  Abdomen - Soft and non tender, non distended  Extremities - No edema noted    Neurological    Mental status - the patient is awake alert oriented x 3, minimal aphasia, no dysarthria, patient is able to follow simple commands, is able to follow complex commands  Cranial nerves 2 through 12 are intact    Motor - 5/5 upper extremities and lower extremitiesnormal tone and bulk, mild left sided drift noted    No tremor noted      Rapid movements are equal bilaterally    Sensation - nonfocal to touch    Toes withdrawals toes equivocal     Lab, Imaging and other studies:     Recent Results (from the past 24 hour(s))   Fingerstick Glucose (POCT)    Collection Time: 04/17/18 10:44 AM   Result Value Ref Range    POC Glucose 167 (H) 65 - 140 mg/dl   Fingerstick Glucose (POCT)    Collection Time: 04/17/18  4:06 PM   Result Value Ref Range    POC Glucose 138 65 - 140 mg/dl   Fingerstick Glucose (POCT)    Collection Time: 04/17/18  9:34 PM   Result Value Ref Range    POC Glucose 159 (H) 65 - 140 mg/dl   Protime-INR    Collection Time: 04/18/18  4:52 AM   Result Value Ref Range    Protime 30 3 (H) 12 1 - 14 4 seconds    INR 2 85 (H) 0 86 - 2 91   Basic metabolic panel    Collection Time: 04/18/18  4:52 AM   Result Value Ref Range    Sodium 142 136 - 145 mmol/L    Potassium 4 4 3 5 - 5 3 mmol/L    Chloride 113 (H) 100 - 108 mmol/L    CO2 19 (L) 21 - 32 mmol/L    Anion Gap 10 4 - 13 mmol/L    BUN 23 5 - 25 mg/dL    Creatinine 1 01 0 60 - 1 30 mg/dL    Glucose 60 (L) 65 - 140 mg/dL    Calcium 6 4 (L) 8 3 - 10 1 mg/dL    eGFR 53 ml/min/1 73sq m   HEMOGLOBIN A1C W/ EAG ESTIMATION    Collection Time: 04/18/18  4:52 AM   Result Value Ref Range    Hemoglobin A1C 7 7 (H) 4 2 - 6 3 %     mg/dl   Lipid Panel with Direct LDL reflex    Collection Time: 04/18/18  4:52 AM   Result Value Ref Range    Cholesterol 101 50 - 200 mg/dL    Triglycerides 99 <=150 mg/dL    HDL, Direct 35 (L) 40 - 60 mg/dL    LDL Calculated 46 0 - 100 mg/dL   Fingerstick Glucose (POCT)    Collection Time: 04/18/18  6:36 AM   Result Value Ref Range    POC Glucose 55 (L) 65 - 140 mg/dl   Fingerstick Glucose (POCT)    Collection Time: 04/18/18  7:45 AM   Result Value Ref Range    POC Glucose 104 65 - 140 mg/dl       Per radiology interpretation -    "  Xr Chest Portable    Result Date: 4/4/2018  Narrative: CHEST INDICATION:   Post Open Heart Surgey  COMPARISON:  4/3/2018 EXAM PERFORMED/VIEWS:  XR CHEST PORTABLE FINDINGS:  Stable right IJ catheter, tip in the caval atrial junction region  Heart shadow is enlarged but unchanged from prior exam  Decreased small left effusion  No new consolidation  No pneumothorax  Osseous structures appear within normal limits for patient age  Impression: Decreased small left effusion  No new consolidation  Workstation performed: [id number]     Xr Chest 2 Views    Result Date: 4/16/2018  Narrative: CHEST INDICATION:   cough  COMPARISON:  4/4/2018 EXAM PERFORMED/VIEWS:  XR CHEST PA & LATERAL Images: 2 FINDINGS: Heart shadow is enlarged but unchanged from prior exam  The lungs are clear  No pneumothorax or pleural effusion  Osseous structures appear within normal limits for patient age  Impression: No acute cardiopulmonary disease  Workstation performed: [id number]     Ct Head Without Contrast    Result Date: 4/16/2018  Narrative: CT BRAIN - WITHOUT CONTRAST INDICATION:   Family noticed patient having difficulty with speech  COMPARISON:  CT head 6/27/2017 TECHNIQUE:  CT examination of the brain was performed  In addition to axial images, coronal 2D reformatted images were created and submitted for interpretation  Radiation dose length product (DLP) for this visit:  950 4 mGy-cm   This examination, like all CT scans performed in the [facility], was performed utilizing techniques to minimize radiation dose exposure, including the use of iterative reconstruction and automated exposure control  IMAGE QUALITY:  Diagnostic  FINDINGS: PARENCHYMA:  No intracranial mass, mass effect or midline shift  No CT signs of acute infarction  No acute intracranial hemorrhage  Age-related cortical atrophy  Advanced periventricular and subcortical white matter hypodensity which is nonspecific although most compatible with chronic small vessel ischemic disease  Vascular and bilateral basal ganglia calcifications  VENTRICLES AND EXTRA-AXIAL SPACES:  Normal for the patient's age  VISUALIZED ORBITS AND PARANASAL SINUSES:  No acute abnormality involving the orbits  Mild scattered sinus mucosal thickening is noted  No fluid levels are seen  CALVARIUM AND EXTRACRANIAL SOFT TISSUES:  Normal      Impression: No acute intracranial abnormality  Age related atrophy and findings most compatible with chronic small vessel ischemic disease   Workstation performed: [id number]     Mri Brain Wo Contrast    Result Date: 4/17/2018  Narrative: MRI BRAIN WITHOUT CONTRAST INDICATION:  expressive aphasia  patient states she knew what she wanted to say but couldn't get the words out-episode occurred yesterday COMPARISON:   4/16/2018 TECHNIQUE:  Sagittal T1, axial T2, axial FLAIR, axial T1, axial [place] and axial diffusion imaging  IMAGE QUALITY:  Diagnostic  FINDINGS: BRAIN PARENCHYMA:  There are at least 4 discrete punctate foci of diffusion restriction including one in the left cerebellum on image 4, series 3  There is a small to moderate-sized cortical infarction in the right occipital lobe on image 13, series 3 in the right posterior cerebral artery territory  More superiorly in the right parietal lobe posteriorly on image 22 series 3 there is a 3rd focus of cortical diffusion restriction in the right MCA territory  A tiny subtle punctate cortical focus in the left frontal lobe anteriorly and inferiorly on image 12 series 3 is noted in the left MCA territory implicating multiple vascular territories with no areas of acute infarction  Elsewhere there is confluent periventricular FLAIR hyperintensity  Striated brainstem and cerebellar hyperintensity noted as well  No acute intracranial hemorrhage  No extra-axial fluid collection  Cerebellar tonsils are normally positioned  VENTRICLES:  Age-appropriate prominence noted  SELLA AND PITUITARY GLAND:  Normal  ORBITS:  Bilateral lens implants noted  PARANASAL SINUSES:  Moderate right sphenoid sinus signal abnormality  Mild bilateral ethmoidal sinus disease  VASCULATURE:  Evaluation of the major intracranial vasculature demonstrates appropriate flow voids  CALVARIUM AND SKULL BASE:  Normal  EXTRACRANIAL SOFT TISSUES:  Normal      Impression: 1   4 punctate foci of diffusion restriction indicative of scattered tiny acute/recent lacunar infarctions in 4 distinct vascular territories implicating a central embolic source  Does the patient have atrial fibrillation? 2  Advanced, chronic microangiopathy  Workstation performed: [id number]     Vas Carotid Complete Study    Result Date: 4/18/2018  Narrative:  [facility] REPORT CLINICAL: Indications:  Patient presents with to evaluate for carotid artery stenosis s/p recent CVA  The patient denies symptoms  Operative History CABG lower extremity a-gram 12/2017  FINDINGS:  Segment      Rig                     Left                      PSV  EDV (cm/s)  Ratio  PSV  EDV (cm/s)  Ratio  Dist  ICA     50          17   0 71   60          20   1 22  Mid  ICA      76          23   1 09   55          18   1 11  Prox  ICA     54          14   0 77   58          16   1 18  Dist CCA      56          11          48          11         Mid CCA       70          14   1 41   50          12   0 78  Prox CCA      50           9          63          12         Ext Carotid  226          28   3 24   55           0   1 11  Prox Vert     73          16          93          19         Subclavian    98           0         105           6            CONCLUSION:  Impression RIGHT: There is <50% stenosis noted in the internal carotid artery  Plaque is heterogenous and irregular  Vertebral artery flow is antegrade  There is no significant subclavian artery disease  LEFT: There is <50% stenosis noted in the internal carotid artery  Plaque is heterogenous and irregular  Vertebral artery flow is antegrade  There is no significant subclavian artery disease  Compared to previous study on 03/18/2018, there is no change  Internal carotid artery stenosis determination by consensus criteria from: Socrates Allison et al  Carotid Artery Stenosis: Gray-Scale and Doppler US Diagnosis - Society of Radiologists in [address] [place] [address], Radiology 2003; [phone]    SIGNATURE: Electronically Signed by: Inge Morrow on 2018-04-18 06:57:52 AM    Xr Chest Portable Icu    Result Date: 4/3/2018  Narrative: CHEST portable INDICATION: S/P TAVR COMPARISON: March 16, 2018 VIEWS:  AP semierect; IMAGES:  1 FINDINGS: Right internal jugular catheter tip at the atrial caval junction  Endotracheal tube tip 4 8 cm above the dwayne  Midline sternotomy wires are present from previous cardiac surgery  The cardiomediastinal silhouette is unchanged  Aortic valve replacement prosthesis, new in the interval  Blunting left costophrenic angle may represent small pleural effusion  Lungs are otherwise well aerated without opacities  Bony thorax is otherwise unremarkable  Electrode (EKG) monitoring devices overlie the chest      Impression: Lines and tubes as described above  Status post aortic valve replacement  No pneumothorax  Small left pleural effusion questioned    Workstation performed: [id number]   "    VTE Prophylaxis: Warfarin (Coumadin)    Counseling / Coordination of Care  Total time spent today 25 minutes  Greater than 50% of total time was spent with the patient and / or family counseling and / or coordination of care   A description of the counseling / coordination of care: floor time, reviewing MRI of brain findings with family, reviewing potential sources of stroke, reviewed plan of care - medicine

## 2018-04-19 ENCOUNTER — TELEPHONE (OUTPATIENT)
Dept: INTERNAL MEDICINE CLINIC | Facility: CLINIC | Age: 78
End: 2018-04-19

## 2018-04-19 ENCOUNTER — TELEPHONE (OUTPATIENT)
Dept: INTERNAL MEDICINE CLINIC | Age: 78
End: 2018-04-19

## 2018-04-19 VITALS
OXYGEN SATURATION: 95 % | HEART RATE: 77 BPM | BODY MASS INDEX: 28.68 KG/M2 | HEIGHT: 64 IN | WEIGHT: 167.99 LBS | RESPIRATION RATE: 18 BRPM | TEMPERATURE: 98.1 F | DIASTOLIC BLOOD PRESSURE: 74 MMHG | SYSTOLIC BLOOD PRESSURE: 140 MMHG

## 2018-04-19 LAB
ANION GAP SERPL CALCULATED.3IONS-SCNC: 8 MMOL/L (ref 4–13)
BUN SERPL-MCNC: 17 MG/DL (ref 5–25)
CALCIUM SERPL-MCNC: 6.4 MG/DL (ref 8.3–10.1)
CHLORIDE SERPL-SCNC: 113 MMOL/L (ref 100–108)
CO2 SERPL-SCNC: 23 MMOL/L (ref 21–32)
CREAT SERPL-MCNC: 0.87 MG/DL (ref 0.6–1.3)
ERYTHROCYTE [DISTWIDTH] IN BLOOD BY AUTOMATED COUNT: 19.1 % (ref 11.6–15.1)
GFR SERPL CREATININE-BSD FRML MDRD: 63 ML/MIN/1.73SQ M
GLUCOSE SERPL-MCNC: 162 MG/DL (ref 65–140)
GLUCOSE SERPL-MCNC: 76 MG/DL (ref 65–140)
GLUCOSE SERPL-MCNC: 77 MG/DL (ref 65–140)
HCT VFR BLD AUTO: 30.7 % (ref 34.8–46.1)
HGB BLD-MCNC: 9.3 G/DL (ref 11.5–15.4)
MCH RBC QN AUTO: 21.4 PG (ref 26.8–34.3)
MCHC RBC AUTO-ENTMCNC: 30.3 G/DL (ref 31.4–37.4)
MCV RBC AUTO: 71 FL (ref 82–98)
PLATELET # BLD AUTO: 159 THOUSANDS/UL (ref 149–390)
POTASSIUM SERPL-SCNC: 3.9 MMOL/L (ref 3.5–5.3)
RBC # BLD AUTO: 4.35 MILLION/UL (ref 3.81–5.12)
SODIUM SERPL-SCNC: 144 MMOL/L (ref 136–145)
WBC # BLD AUTO: 5.3 THOUSAND/UL (ref 4.31–10.16)

## 2018-04-19 PROCEDURE — 99232 SBSQ HOSP IP/OBS MODERATE 35: CPT | Performed by: PSYCHIATRY & NEUROLOGY

## 2018-04-19 PROCEDURE — 85027 COMPLETE CBC AUTOMATED: CPT | Performed by: INTERNAL MEDICINE

## 2018-04-19 PROCEDURE — 97530 THERAPEUTIC ACTIVITIES: CPT

## 2018-04-19 PROCEDURE — 97535 SELF CARE MNGMENT TRAINING: CPT

## 2018-04-19 PROCEDURE — 80048 BASIC METABOLIC PNL TOTAL CA: CPT | Performed by: INTERNAL MEDICINE

## 2018-04-19 PROCEDURE — 82948 REAGENT STRIP/BLOOD GLUCOSE: CPT

## 2018-04-19 PROCEDURE — 99239 HOSP IP/OBS DSCHRG MGMT >30: CPT | Performed by: INTERNAL MEDICINE

## 2018-04-19 PROCEDURE — 99232 SBSQ HOSP IP/OBS MODERATE 35: CPT | Performed by: INTERNAL MEDICINE

## 2018-04-19 PROCEDURE — 97116 GAIT TRAINING THERAPY: CPT

## 2018-04-19 RX ORDER — INSULIN GLARGINE 100 [IU]/ML
20 INJECTION, SOLUTION SUBCUTANEOUS 2 TIMES DAILY
Qty: 10 ML | Refills: 0
Start: 2018-04-19 | End: 2018-04-19

## 2018-04-19 RX ORDER — INSULIN GLARGINE 100 [IU]/ML
20 INJECTION, SOLUTION SUBCUTANEOUS 2 TIMES DAILY
Qty: 10 ML | Refills: 0
Start: 2018-04-19 | End: 2018-09-24 | Stop reason: HOSPADM

## 2018-04-19 RX ADMIN — ATORVASTATIN CALCIUM 80 MG: 80 TABLET, FILM COATED ORAL at 08:17

## 2018-04-19 RX ADMIN — ASPIRIN 81 MG 81 MG: 81 TABLET ORAL at 08:17

## 2018-04-19 RX ADMIN — VITAMIN D, TAB 1000IU (100/BT) 1000 UNITS: 25 TAB at 08:17

## 2018-04-19 RX ADMIN — INSULIN GLARGINE 15 UNITS: 100 INJECTION, SOLUTION SUBCUTANEOUS at 08:17

## 2018-04-19 RX ADMIN — LEVOTHYROXINE SODIUM 100 MCG: 100 TABLET ORAL at 05:38

## 2018-04-19 RX ADMIN — METOPROLOL TARTRATE 100 MG: 50 TABLET ORAL at 08:17

## 2018-04-19 RX ADMIN — FLUTICASONE PROPIONATE 1 PUFF: 110 AEROSOL, METERED RESPIRATORY (INHALATION) at 08:17

## 2018-04-19 NOTE — PLAN OF CARE
Problem: OCCUPATIONAL THERAPY ADULT  Goal: Performs self-care activities at highest level of function for planned discharge setting  See evaluation for individualized goals  Treatment Interventions: ADL retraining, Functional transfer training, Endurance training, Patient/family training, Equipment evaluation/education, Compensatory technique education, Continued evaluation, Energy conservation (cog assess/tx)          See flowsheet documentation for full assessment, interventions and recommendations  Outcome: Progressing  Limitation: Decreased ADL status, Decreased Safe judgement during ADL, Decreased cognition, Decreased endurance, Decreased self-care trans, Decreased high-level ADLs  Prognosis: Good, Fair  Assessment: Pt  seen for OT treatment session to address UB/LB dressing and bathing, grooming, mobility, and transfers  Pt  pleasant and agreeable to participate and overall, pt  tolerated session well  Pt  functioning at a (S) level for functional mobility and transfers with the use of RW  (s) is needed as pt  Requires v c  To use RW as pt  Will frequently abandon  RW needed for balance and steadying d/t balance deficits  (s) was also provided during LB bathing and dressing as balance decreased with dynamic tasks and pt  Required v c  To complete activities in sitting to increase safety  Pt  Able to complete grooming and UB bathing and dressing without physical A after s/u  Pt  will continue to benefit from OT services to address noted deficits and maximize functional gains while in acute care  Rec  Home with increased (s)/support from family and home OT services to address remaining deficits, cognition, adaptive/compensatory strategies to increase safety and assessment of home s/u s/p d c  Spoke with RN who reports that family is considering having pt  Stay with them s/p d c  And feel this is appropriate as pt  Results in ACLS indicate that pt  Would benefit from 24 hour (S)        OT Discharge Recommendation: Home OT (24 hour S/A from family)  OT - OK to Discharge:  Yes

## 2018-04-19 NOTE — PROGRESS NOTES
Progress Note - Cardiology   Adam Woodson [de-identified] y o  female MRN: [id number]  Unit/Bed#: [facility] 726-01 Encounter: [id number]    Assessment:  Principal Problem:    CVA (cerebral vascular accident) [facility])  Active Problems:    Essential hypertension    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 native heart with angina pectoris ([facility] [address] )    Type 2 diabetes mellitus with complication, with long-term current use of insulin (HCC)    Hyperlipidemia    Gastroesophageal reflux disease without esophagitis    Moderate mitral stenosis    LEONARD (acute kidney injury) ([facility] )    Asymptomatic carotid artery stenosis, bilateral    Atherosclerosis of artery of extremity with ulceration (HCC)    Hx of CABG    Migraine headache    Paroxysmal atrial fibrillation (HCC)    Ulcer of toe of left foot (HCC)    Ulcer of toe of right foot (HCC)    Left bundle branch block (LBBB)    1st degree AV block    S/P TAVR (transcatheter aortic valve replacement)    Expressive aphasia    [de-identified]year old female with AS s/p TAVR on 4/3/2016  PAF was on warfarin  CAD, DM, carotid disease  Comes with CVA, felt to be embolic  Unclear if related to PAF, or post TAVR  Plan:  She has changed to Xarelto instead of warfarin  Will be discharged on this, and I will alert our Coumadin clinic about this  Subjective/Objective     Subjective:  Feels well  No events overnight  ROBB without any evidence of intracardiac thrombus  Normal valve function  Received prescription of Xarelto      Objective:    Vitals: /74 (BP Location: Left arm)   Pulse 77   Temp 98 1 °F (36 7 °C) (Oral)   Resp 18   Ht 5' 4" (1 626 m)   Wt 76 2 kg (167 lb 15 9 oz)   LMP  (LMP Unknown)   SpO2 95%   BMI 28 84 kg/m²   Vitals:    04/16/18 2020   Weight: 76 2 kg (167 lb 15 9 oz)     Orthostatic Blood Pressures    Flowsheet Row Most Recent Value   Blood Pressure  140/74 filed at 04/19/2018 1110   Patient Position - Orthostatic VS  Sitting filed at 04/19/2018 1110 Intake/Output Summary (Last 24 hours) at 04/19/18 1358  Last data filed at 04/18/18 1810   Gross per 24 hour   Intake              405 ml   Output                0 ml   Net              405 ml     Physical Exam:  GEN: Emerald Bryant appears well, alert  NAD  Seated in chair  HEENT: pupils equal, round, and reactive to light; extraocular muscles intact  NECK: supple, no carotid bruits   HEART: regular rhythm, normal S1 and S2  Soft 2/6 JAMAAL    LUNGS: clear to auscultation bilaterally; no wheezes, rales, or rhonchi   ABDOMEN: normal bowel sounds, soft, no tenderness, no distention  EXTREMITIES: peripheral pulses normal; no clubbing, cyanosis, or edema  SKIN: normal without suspicious lesions on exposed skin    Medications:    Current Facility-Administered Medications:     acetaminophen (TYLENOL) tablet 650 mg, 650 mg, Oral, Q6H PRN, Piedad Warren    aspirin chewable tablet 81 mg, 81 mg, Oral, Daily, Piedad Warren, 81 mg at 04/19/18 0817    atorvastatin (LIPITOR) tablet 80 mg, 80 mg, Oral, Daily, Piedad [place], 80 mg at 04/19/18 0817    cholecalciferol (VITAMIN D3) tablet 1,000 Units, 1,000 Units, Oral, Daily, Piedad Warren, 1,000 Units at 04/19/18 0817    docusate sodium (COLACE) capsule 100 mg, 100 mg, Oral, BID PRN, Piedad Kelley    fluticasone (FLOVENT HFA) 110 MCG/ACT inhaler 1 puff, 1 puff, Inhalation, Q12H [facility], Piedad Kelley, 1 puff at 04/19/18 0817    insulin glargine (LANTUS) subcutaneous injection 15 Units, 15 Units, Subcutaneous, Daily With Breakfast, Piedad Kelley, 15 Units at 04/19/18 0817    insulin glargine (LANTUS) subcutaneous injection 15 Units, 15 Units, Subcutaneous, HS, Piedad Kelley, 15 Units at 04/18/18 2205    levothyroxine tablet 100 mcg, 100 mcg, Oral, Early Morning, Piedad Kelley, 100 mcg at 04/19/18 0538    metoprolol tartrate (LOPRESSOR) tablet 100 mg, 100 mg, Oral, Q12H [facility], Piedad Warren, 100 mg at 04/19/18 0817    warfarin (COUMADIN) tablet 3 mg, 3 mg, Oral, Daily (warfarin), Zeferino Dailey, 3 mg at 04/18/18 1701    Lab Results:    Results from last 7 days  Lab Units 04/16/18  1326   TROPONIN I ng/mL 0 06*       Results from last 7 days  Lab Units 04/19/18  0503 04/18/18  1043 04/17/18  0447   WBC Thousand/uL 5 30 5 92 5 26   HEMOGLOBIN g/dL 9 3* 10 4* 9 3*   HEMATOCRIT % 30 7* 33 8* 29 9*   PLATELETS Thousands/uL 159 163 140*       Results from last 7 days  Lab Units 04/18/18  0452   CHOLESTEROL mg/dL 101   TRIGLYCERIDES mg/dL 99   HDL mg/dL 35*       Results from last 7 days  Lab Units 04/19/18  0503 04/18/18  0452 04/17/18  0447 04/16/18  1326   SODIUM mmol/L 144 142 139 138   POTASSIUM mmol/L 3 9 4 4 3 8 4 5   CHLORIDE mmol/L 113* 113* 107 103   CO2 mmol/L 23 19* 23 24   BUN mg/dL 17 23 36* 47*   CREATININE mg/dL 0 87 1 01 1 07 1 48*   CALCIUM mg/dL 6 4* 6 4* 6 6* 7 1   TOTAL PROTEIN g/dL  --   --  6 4 7 4   BILIRUBIN TOTAL mg/dL  --   --  0 50 0 36   ALK PHOS U/L  --   --  60 73   ALT U/L  --   --  21 28   AST U/L  --   --  38 48*   GLUCOSE RANDOM mg/dL 76 60* 80 105       Results from last 7 days  Lab Units 04/18/18  0452 04/16/18  1326   INR  2 85* 3 14*   PTT seconds  --  47*         Telemetry: Personally reviewed  PSVT self limited    Echo:  LEFT VENTRICLE:  Systolic function was at the lower limits of normal  Ejection fraction was estimated to be 50 %  Although no diagnostic regional wall motion abnormality was identified, this possibility cannot be completely excluded on the basis of this study  Wall thickness was moderately increased  There was moderate concentric hypertrophy      LEFT ATRIUM:  The atrium was moderately to markedly dilated      RIGHT ATRIUM:  The atrium was moderately dilated      MITRAL VALVE:  There was marked annular calcification  There was moderate diffuse thickening of the anterior and posterior leaflets, involving the leaflet from base to margin  There was moderately reduced leaflet separation    Transmitral velocity was increased due to valvular stenosis  There was moderate stenosis  There was mild regurgitation  Mean transmitral gradient was 9 mmHg      AORTIC VALVE:  A transcatheter bioprosthesis (#26 [address] 3 TAVR) was present  The prosthesis was well-seated without stenosis or regurgitation  Peak and mean velocities (gradients) were 1 79 (13) and 1 14 (6) m/sec (mmHg), respectively  The  calculated aortic valve area was 1 6 cm2 using continuity equation  TRICUSPID VALVE:  There was moderate regurgitation  Estimated peak PA pressure was 56 mmHg  The findings suggest moderate pulmonary hypertension      PULMONIC VALVE:  There was trace regurgitation

## 2018-04-19 NOTE — OCCUPATIONAL THERAPY NOTE
OccupationalTherapy Progress Note     Patient Name: Edward CHERRY Date: 4/19/2018  Problem List  Patient Active Problem List   Diagnosis    Essential hypertension    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 native heart with angina pectoris ([facility] )    Type 2 diabetes mellitus with complication, with long-term current use of insulin ([facility])    Atrial flutter (HCC)    Hyperlipidemia    Gastroesophageal reflux disease without esophagitis    Moderate mitral stenosis    LEONARD (acute kidney injury) ([facility] )    Arthritis of hand    Asthma    Asymptomatic carotid artery stenosis, bilateral    Atherosclerosis of artery of extremity with ulceration ([facility] )    Hx of CABG    Chronic anticoagulation    Chronic diastolic congestive heart failure (HCC)    Chronic systolic heart failure (HCC)    Degenerative joint disease involving multiple joints    Diabetic retinopathy ([facility])    Postoperative hypothyroidism    Migraine headache    Nephrolithiasis    Osteoarthritis of both knees    Paroxysmal atrial fibrillation (HCC)    Ulcer of toe of left foot (HCC)    Ulcer of toe of right foot (HCC)    Prolonged Q-T interval on ECG    Acute respiratory insufficiency, postoperative    Left bundle branch block (LBBB)    1st degree AV block    S/P TAVR (transcatheter aortic valve replacement)    Expressive aphasia    CVA (cerebral vascular accident) ([place] [address] )        04/19/18 0910   Restrictions/Precautions   Weight Bearing Precautions Per Order No   Other Precautions Cognitive; Chair Alarm; Fall Risk  (chair alarm active at end of session)   Pain Assessment   Pain Assessment No/denies pain   Pain Score No Pain   ADL   Where Assessed (st sink)   Grooming Assistance 5  Supervision/Setup   Grooming Deficit Setup; Wash/dry face; Teeth care   UB Bathing Assistance 5  Supervision/Setup   UB Bathing Deficit Setup; increased time required  (sponge bathing)   LB Bathing Assistance 5  Supervision/Setup   LB Bathing Deficit Setup;Supervision/safety;Verbal cueing; increased time required   UB Dressing Assistance 5  Supervision/Setup   UB Dressing Deficit Setup  (sponge abthing)   LB Dressing Assistance 5  Supervision/Setup   LB Dressing Deficit Setup;Supervision/safety;Verbal cueing   Functional Standing Tolerance   Time ~5 min LB bathing   Transfers   Sit to Stand 5  Supervision   Additional items Verbal cues   Stand to Sit 5  Supervision   Additional items Verbal cues   Functional Mobility   Functional Mobility 5  Supervision   Additional Comments v c  Additional items Rolling walker   Cognition   Overall Cognitive Status Impaired   Arousal/Participation Alert; Cooperative   Attention Attends with cues to redirect   Orientation Level Oriented X4   Memory Decreased short term memory;Decreased recall of precautions   Following Commands Follows one step commands without difficulty   Activity Tolerance   Activity Tolerance Patient tolerated treatment well   Medical Staff Made Aware yes, TOMY Haney cleared for session, spoke with CM about d c  rec   Assessment   Assessment Pt  seen for OT treatment session to address UB/LB dressing and bathing, grooming, mobility, and transfers  Pt  pleasant and agreeable to participate and overall, pt  tolerated session well  Pt  functioning at a (S) level for functional mobility and transfers with the use of RW  (s) is needed as pt  Requires v c  To use RW as pt  Will frequently abandon  RW needed for balance and steadying d/t balance deficits  (s) was also provided during LB bathing and dressing as balance decreased with dynamic tasks and pt  Required v c  To complete activities in sitting to increase safety  Pt  Able to complete grooming and UB bathing and dressing without physical A after s/u  Pt  will continue to benefit from OT services to address noted deficits and maximize functional gains while in acute care  Rec   Home with increased (s)/support from family and home OT services to address remaining deficits, cognition, adaptive/compensatory strategies to increase safety and assessment of home s/u s/p d c  Spoke with RN who reports that family is considering having pt  Stay with them s/p d c  And feel this is appropriate as pt  Results in ACLS indicate that pt  Would benefit from 24 hour (S)  Plan   Treatment Interventions ADL retraining;Functional transfer training; Endurance training;Patient/family training;Equipment evaluation/education; Compensatory technique education;Continued evaluation; Energy conservation  (cog assess and tx)   Goal Expiration Date 04/27/18   OT Frequency 3-5x/wk   Recommendation   OT Discharge Recommendation Home OT  (24 hour S/A from family)   OT - OK to Discharge Yes   Barthel Index   Feeding 10   Bathing 5   Grooming Score 5   Dressing Score 10   Bladder Score 10   Bowels Score 10   Toilet Use Score 10   Transfers (Bed/Chair) Score 10   Mobility (Level Surface) Score 10   Stairs Score 0   Barthel Index Score 80   Modified Rae Scale   Modified Rae Scale 3    Blake Watt, MOT, OTR/L

## 2018-04-19 NOTE — RESTORATIVE TECHNICIAN NOTE
Restorative Specialist Mobility Note       Activity: Ambulate in morrison, Ambulate in room, Bathroom privileges, Chair, Dangle, Stand at bedside (Educated/encouraged pt to ambulate with assistance 3-4 x's/day  Chair alarm on   Pt callbell, phone/tray within reach )     Assistive Device: Front wheel walker       Cole PERALTA, Restorative Technician, [employer]

## 2018-04-19 NOTE — TELEPHONE ENCOUNTER
Patient was d/s from [facility] on 4/19/2018  I did schedule her with dr Misael Carroll on 4/24/2018 at [facility]  Please start a TCM for this patient

## 2018-04-19 NOTE — PLAN OF CARE
Communication Impairment     Ability to express needs and understand communication [place]     Discharge to home or other facility with appropriate resources Progressing        DISCHARGE PLANNING - CARE MANAGEMENT     Discharge to post-acute care or home with appropriate resources Progressing        INFECTION - ADULT     Absence or prevention of progression during hospitalization Progressing        Knowledge Deficit     Patient/family/caregiver demonstrates understanding of disease process, treatment plan, medications, and discharge instructions Progressing        Neurological Deficit     Neurological status is stable or improving Progressing        Nutrition/Hydration-ADULT     Nutrient/Hydration intake appropriate for improving, restoring or maintaining nutritional needs Progressing        PAIN - ADULT     Verbalizes/displays adequate comfort level or baseline comfort level Progressing        Potential for Falls     Patient will remain free of falls Progressing        SAFETY ADULT     Maintain or return to baseline ADL function Progressing     Maintain or return mobility status to optimal level Progressing

## 2018-04-19 NOTE — PROGRESS NOTES
Progress Note - Neurology   Tom Bui [de-identified] y o  female MRN: [id number]  Unit/Bed#: [facility] 726-01 Encounter: [id number]    Assessment:  22-year-old female with past medical history of recent aortic valve replacement for aortic stenosis, atrial fibrillation and was on anticoagulation  The patient presented with several days duration of dysarthric speech with word-finding difficulty  The MRI of the brain documented bihemispheric multi vascular territory punctate infarcts  ROBB was completed - no pfo, no thrombus noted, no atheroma, moderate mac    Plan:  -  Stroke pathway, neuro checks  -  Recommend changing to NOAC, continue ASA  -  Secondary risk factor modification  -  Telemetry  -  Rehab    Subjective:   Reviewed ROBB results  No events overnight  Patient ports she feels fine, reviewed plan of care with patient who is in agreement  ROS:  No headache, chest pain, shortness of breath, palpitations, nausea or vomiting, one-sided weakness or paresthesias, no reported clumsiness  She reports her speech has improved  Vitals: Blood pressure 147/79, pulse 80, temperature 98 9 °F (37 2 °C), temperature source Oral, resp  rate 18, height 5' 4" (1 626 m), weight 76 2 kg (167 lb 15 9 oz), SpO2 96 %, not currently breastfeeding  ,Body mass index is 28 84 kg/m²        Current Facility-Administered Medications:  acetaminophen 650 mg Oral Q6H PRN Porfirio Rule   aspirin 81 mg Oral Daily Porfirio Rule   atorvastatin 80 mg Oral Daily Brian Branch   cholecalciferol 1,000 Units Oral Daily Brian Branch   docusate sodium 100 mg Oral BID PRN Jitendra Kennedy   fluticasone 1 puff Inhalation Q12H [address] Brian Branch   insulin glargine 15 Units Subcutaneous Daily With Breakfast Brian Branch   insulin glargine 15 Units Subcutaneous HS Porfirio Rule   levothyroxine 100 mcg Oral Early Morning Porfirio Rule   metoprolol tartrate 100 mg Oral Q12H [address] Brian Branch   warfarin 3 mg Oral Daily (warfarin) [place] Rule     Physical Exam:   Constitutional - in NAD  HEENT - NC/AT, no icterus noted, conjuctiva clear, oral mucosa free of exudate  Cardiac - + murmur noted, rate regular  Lungs - Clear to auscultation bilaterally  Abdomen - Soft and non tender, non distended  Extremities - No edema noted - tenderness to touch to feet bilaterally     Neurological     Mental status - the patient is awake alert oriented x 3, difficulty to detect aphasia, no dysarthria, patient is able to follow simple commands, is able to follow complex commands    She is able to tell me current events, read      Cranial nerves 2 through 12 are intact - pupils are equal and reactive, eomi, no facial droop, tongue midline      Motor - 5/5 upper extremities and lower extremities normal tone and bulk     No tremor noted      Rapid movements are equal bilaterally    Coordination - no ataxia noted on FTN testing     Sensation - nonfocal to touch      Lab, Imaging and other studies:    Recent Results (from the past 24 hour(s))   CBC    Collection Time: 04/18/18 10:43 AM   Result Value Ref Range    WBC 5 92 4 31 - 10 16 Thousand/uL    RBC 4 91 3 81 - 5 12 Million/uL    Hemoglobin 10 4 (L) 11 5 - 15 4 g/dL    Hematocrit 33 8 (L) 34 8 - 46 1 %    MCV 69 (L) 82 - 98 fL    MCH 21 2 (L) 26 8 - 34 3 pg    MCHC 30 8 (L) 31 4 - 37 4 g/dL    RDW 19 2 (H) 11 6 - 15 1 %    Platelets 583 052 - 290 Thousands/uL   Fingerstick Glucose (POCT)    Collection Time: 04/18/18 10:59 AM   Result Value Ref Range    POC Glucose 70 65 - 140 mg/dl   Fingerstick Glucose (POCT)    Collection Time: 04/18/18 11:52 AM   Result Value Ref Range    POC Glucose 78 65 - 140 mg/dl   Fingerstick Glucose (POCT)    Collection Time: 04/18/18  4:26 PM   Result Value Ref Range    POC Glucose 183 (H) 65 - 140 mg/dl   Fingerstick Glucose (POCT)    Collection Time: 04/18/18  8:46 PM   Result Value Ref Range    POC Glucose 256 (H) 65 - 140 mg/dl   Basic metabolic panel    Collection Time: 04/19/18  5:03 AM   Result Value Ref Range    Sodium 144 136 - 145 mmol/L    Potassium 3 9 3 5 - 5 3 mmol/L    Chloride 113 (H) 100 - 108 mmol/L    CO2 23 21 - 32 mmol/L    Anion Gap 8 4 - 13 mmol/L    BUN 17 5 - 25 mg/dL    Creatinine 0 87 0 60 - 1 30 mg/dL    Glucose 76 65 - 140 mg/dL    Calcium 6 4 (L) 8 3 - 10 1 mg/dL    eGFR 63 ml/min/1 73sq m   CBC    Collection Time: 04/19/18  5:03 AM   Result Value Ref Range    WBC 5 30 4 31 - 10 16 Thousand/uL    RBC 4 35 3 81 - 5 12 Million/uL    Hemoglobin 9 3 (L) 11 5 - 15 4 g/dL    Hematocrit 30 7 (L) 34 8 - 46 1 %    MCV 71 (L) 82 - 98 fL    MCH 21 4 (L) 26 8 - 34 3 pg    MCHC 30 3 (L) 31 4 - 37 4 g/dL    RDW 19 1 (H) 11 6 - 15 1 %    Platelets 184 095 - 920 Thousands/uL   Fingerstick Glucose (POCT)    Collection Time: 04/19/18  6:29 AM   Result Value Ref Range    POC Glucose 77 65 - 140 mg/dl     Per radiology interpretation -    "    Mri Brain Wo Contrast    Result Date: 4/17/2018  Narrative: MRI BRAIN WITHOUT CONTRAST INDICATION:  expressive aphasia  patient states she knew what she wanted to say but couldn't get the words out-episode occurred yesterday COMPARISON:   4/16/2018 TECHNIQUE:  Sagittal T1, axial T2, axial FLAIR, axial T1, axial [place] and axial diffusion imaging  IMAGE QUALITY:  Diagnostic  FINDINGS: BRAIN PARENCHYMA:  There are at least 4 discrete punctate foci of diffusion restriction including one in the left cerebellum on image 4, series 3  There is a small to moderate-sized cortical infarction in the right occipital lobe on image 13, series 3 in the right posterior cerebral artery territory  More superiorly in the right parietal lobe posteriorly on image 22 series 3 there is a 3rd focus of cortical diffusion restriction in the right MCA territory  A tiny subtle punctate cortical focus in the left frontal lobe anteriorly and inferiorly on image 12 series 3 is noted in the left MCA territory implicating multiple vascular territories with no areas of acute infarction   Elsewhere there is confluent periventricular FLAIR hyperintensity  Striated brainstem and cerebellar hyperintensity noted as well  No acute intracranial hemorrhage  No extra-axial fluid collection  Cerebellar tonsils are normally positioned  VENTRICLES:  Age-appropriate prominence noted  SELLA AND PITUITARY GLAND:  Normal  ORBITS:  Bilateral lens implants noted  PARANASAL SINUSES:  Moderate right sphenoid sinus signal abnormality  Mild bilateral ethmoidal sinus disease  VASCULATURE:  Evaluation of the major intracranial vasculature demonstrates appropriate flow voids  CALVARIUM AND SKULL BASE:  Normal  EXTRACRANIAL SOFT TISSUES:  Normal      Impression: 1   4 punctate foci of diffusion restriction indicative of scattered tiny acute/recent lacunar infarctions in 4 distinct vascular territories implicating a central embolic source  Does the patient have atrial fibrillation? 2  Advanced, chronic microangiopathy  Workstation performed: [id number]     Vas Carotid Complete Study    Result Date: 4/18/2018  Narrative:  [facility] REPORT CLINICAL: Indications:  Patient presents with to evaluate for carotid artery stenosis s/p recent CVA  The patient denies symptoms  Operative History CABG lower extremity a-gram 12/2017  FINDINGS:  Segment      Rig                     Left                      PSV  EDV (cm/s)  Ratio  PSV  EDV (cm/s)  Ratio  Dist  ICA     50          17   0 71   60          20   1 22  Mid  ICA      76          23   1 09   55          18   1 11  Prox   ICA     54          14   0 77   58          16   1 18  Dist CCA      56          11          48          11         Mid CCA       70          14   1 41   50          12   0 78  Prox CCA      50           9          63          12         Ext Carotid  226          28   3 24   55           0   1 11  Prox Vert     73          16          93          19         Subclavian    98           0         105           6            CONCLUSION:  Impression RIGHT: There is <50% stenosis noted in the internal carotid artery  Plaque is heterogenous and irregular  Vertebral artery flow is antegrade  There is no significant subclavian artery disease  LEFT: There is <50% stenosis noted in the internal carotid artery  Plaque is heterogenous and irregular  Vertebral artery flow is antegrade  There is no significant subclavian artery disease  Compared to previous study on 03/18/2018, there is no change  Internal carotid artery stenosis determination by consensus criteria from: Serene Ibarra et al  Carotid Artery Stenosis: Gray-Scale and Doppler US Diagnosis - Society of Radiologists in [address] [place] [address], Radiology 2003; [phone]  SIGNATURE: Electronically Signed by: Boo Sanford on 2018-04-18 06:57:52 AM  "    VTE Prophylaxis: Warfarin (Coumadin)    Counseling / Coordination of Care  Total time spent today 20 minutes  Greater than 50% of total time was spent with the patient and / or family counseling and / or coordination of care   A description of the counseling / coordination of care: floor time reviewing records, reviewed ORBB results and plan of care

## 2018-04-19 NOTE — SOCIAL WORK
Patient identified as HRR per criteria  Call made to DC appointment hotline with information as required for CM support follow up  Outpt Care mgt referral made

## 2018-04-19 NOTE — SOCIAL WORK
CM was requested to check copay for Xarelto and Eliquis  Script sent to [facility] per pts request--Eliquis $8 35 and Xarelto $8 35  Dr George Ibarra is requesting Xarelto  CM informed pt of same  Pt is agreeable to $8 35  CM informed pt that she is eligible for a 30 day free trial card  Xarelto 30 day free trial delivered to pts room prior to dc through MEDS program     Pt's son's to transport home at dc  CM Support scheduled DC appointment  Follow up appointment discussed and transportation arrangements verified with patient and/or caregiver  Appointment slip given to patient and/or caregiver  AVS reviewed for appointment documentation prior to discharge

## 2018-04-19 NOTE — PLAN OF CARE
Communication Impairment     Ability to express needs and understand communication Completed        DISCHARGE PLANNING     Discharge to home or other facility with appropriate resources Completed        DISCHARGE PLANNING - CARE MANAGEMENT     Discharge to post-acute care or home with appropriate resources Completed        INFECTION - ADULT     Absence or prevention of progression during hospitalization Completed        Knowledge Deficit     Patient/family/caregiver demonstrates understanding of disease process, treatment plan, medications, and discharge instructions Completed        Neurological Deficit     Neurological status is stable or improving Completed        Nutrition/Hydration-ADULT     Nutrient/Hydration intake appropriate for improving, restoring or maintaining nutritional needs Completed        PAIN - ADULT     Verbalizes/displays adequate comfort level or baseline comfort level Completed        Potential for Falls     Patient will remain free of falls Completed        SAFETY ADULT     Maintain or return to baseline ADL function Completed     Maintain or return mobility status to optimal level Completed

## 2018-04-19 NOTE — SOCIAL WORK
Informed that pt will be staying with her dgt and son in law upon discharge  Met with pt and son in law to discuss same  Pt will be staying with them and their address is [place], [address]   for [facility] AT [facility] is pt's son in law Lanny Aguilar [phone]  Information provided to [facility] via [facility]

## 2018-04-19 NOTE — DISCHARGE INSTRUCTIONS
Try to keep interacting with family and friends  It can take weeks or months to see full recovery from aphasia  Take Xarelto daily  If you have any injury remember that you are on a blood thinner  Notify a doctor if you have blood in your stool or black stool  Stop taking coumadin  You have been using less insulin in the hospital   Take 20 units of insulin twice daily  Please call if your insulin is low  Stop taking your Lasix  Call if you notice leg swelling, shortness of breath on exertion on lying  Aphasia   WHAT YOU NEED TO KNOW:   What is aphasia? Aphasia is a condition that decreases the ability to speak, read, write, or understand others  Aphasia can be a short-term or long-term problem  What causes aphasia? Aphasia is usually caused by a brain injury or damage  A stroke is the most common brain injury that causes aphasia  Other causes include brain diseases such as cancer, epilepsy, and Alzheimer disease  What are the signs and symptoms of aphasia? Signs and symptoms depend on the area of the brain that was damaged:  · Trouble finding and speaking words, even for short phrases or in writing    · Speaking in long sentences that have no meaning    · Useless or made up words added to sentences    · Trouble understanding what others say, or trouble reading    · Not being able to express thoughts through speech or writing  How is aphasia diagnosed? A healthcare provider may recognize signs of aphasia during a conversation or writing exercise  Mild aphasia may be more difficult to recognize  The following tests can help diagnose aphasia and show if it is mild or severe:  · MRI or PET scan pictures  may show damaged areas of the brain  No one should enter the MRI room with anything metal  Metal can cause serious damage  Tell the healthcare provider about any metal that could react to the MRI magnet      · Speech-language assessment  is used by a specialist to test the ability to speak and read   How is aphasia treated? Some people recover without treatment  Medicines or surgery may be needed to treat the brain injury  These treatments may also improve aphasia  Most people with aphasia need speech-language therapy  Speech-language therapy can help with exercises to improve communication  Ask for more information on aphasia exercises  What can help make communication easier? · Help others understand how to communicate effectively  A speech-language therapist can help create a book that has pictures, words, or symbols to help with communication  Others may need to speak more slowly and clearly, or only write words  It may help to have a pen and pad of paper available at all times  · Have extra time for listening, speaking, reading, and writing  Phone calls or conversations should be at times when it is easy to focus  Communication should never be rushed or done along with another task  · Reduce distractions  If possible, turn off any TV, music, or other sound during conversations or reading  Background noise can make it more difficult to focus  · Ask about technology  Software programs can be loaded onto computers that help with communication  Examples include software that types words as they are spoken, or that read text on the computer aloud  The speed can be slowed as needed to help with understanding spoken words  A speech-language therapist can help set up adaptive technology to help with written and spoken communication  · Ask about medical alert identification  Medical alert jewelry or an identification card can help others understand the communication problems caused by aphasia  Ask a healthcare provider where to get medical alert identification  The jewelry or card should be carried at all times    For support and more information:   · [facility]  [address] [facility] , [address] [place]  Phone: [phone]  Web Address: [employer] si  org  · [employer]  [address] , [address] [facility]  Phone: [phone]  Web Address: [employer]  When should I contact a healthcare provider? · The person's communication problems are getting worse  · The person is depressed because of communication problems  · You have questions or concerns about the person's condition or care  CARE AGREEMENT:   You have the right to help plan your care  Learn about your health condition and how it may be treated  Discuss treatment options with your caregivers to decide what care you want to receive  You always have the right to refuse treatment  The above information is an  only  It is not intended as medical advice for individual conditions or treatments  Talk to your doctor, nurse or pharmacist before following any medical regimen to see if it is safe and effective for you  © 2017 [address] [employer] Information is for End User's use only and may not be sold, redistributed or otherwise used for commercial purposes  All illustrations and images included in CareNotes® are the copyrighted property of A D A M , Inc  or Luis Felipe Spaulding

## 2018-04-19 NOTE — PLAN OF CARE
Problem: PHYSICAL THERAPY ADULT  Goal: Performs mobility at highest level of function for planned discharge setting  See evaluation for individualized goals  Treatment/Interventions: Functional transfer training, LE strengthening/ROM, Elevations, Therapeutic exercise, Endurance training, Patient/family training, Equipment eval/education, Bed mobility, Gait training  Equipment Recommended: Pauline Garay (needs to use at all times)       See flowsheet documentation for full assessment, interventions and recommendations  Outcome: Progressing  Prognosis: Good  Problem List: Decreased strength, Decreased endurance, Impaired balance, Decreased mobility, Decreased cognition, Impaired judgement, Decreased safety awareness  Assessment: Pt seen for session, gait and PT 1:1 activity x 25 min for setup, transfer, gait w several rests, repositioning in chair p session  Pt remains alert, w/ some confusion  c/o generally tired today, but willing to mobilize  No LOB, but overall slow gait pattern due to fatigue  OT noting pt appropriate to d/c home so provided they are OK with her going home from a cog perspective, agree w/ home w/ home therapies        Recommendation:  (home w/ home therapies)     PT - OK to Discharge: (S) Yes (to hoem w/ home therapies when stable)    See flowsheet documentation for full assessment

## 2018-04-19 NOTE — DISCHARGE SUMMARY
[address] Discharge Summary - Medical Freya Nichols [de-identified] y o  female MRN: [id number]    [address]  Room / Bed: [facility] 726/[facility] 726-01 Encounter: [id number]    BRIEF OVERVIEW    Admitting Provider: Terrance Anaya MD  Discharge Provider: Terrance Anaya MD  Primary Care Physician at Discharge: Yuliana Banks MD    Discharge To: Home    Admission Date: 4/16/2018     Discharge Date: 4/19/2018  3:08 PM    Primary Discharge Diagnosis  Principal Problem:    CVA (cerebral vascular accident) [facility])  Active Problems:    Essential hypertension    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 native heart with angina pectoris ([facility] )    Type 2 diabetes mellitus with complication, with long-term current use of insulin (HCC)    Hyperlipidemia    Gastroesophageal reflux disease without esophagitis    Moderate mitral stenosis    LEONARD (acute kidney injury) ([facility] )    Asymptomatic carotid artery stenosis, bilateral    Atherosclerosis of artery of extremity with ulceration (HCC)    Hx of CABG    Migraine headache    Paroxysmal atrial fibrillation (HCC)    Ulcer of toe of left foot (HCC)    Ulcer of toe of right foot (HCC)    Left bundle branch block (LBBB)    1st degree AV block    S/P TAVR (transcatheter aortic valve replacement)    Expressive aphasia  Resolved Problems:    * No resolved hospital problems  *      Other Problems Addressed: None  Consulting Providers   Neurology  Cardiology       Therapeutic Operative Procedures Performed  None    Diagnostic Procedures Performed  Xr Chest 2 Views    Result Date: 4/16/2018  Impression: No acute cardiopulmonary disease  Workstation performed: [id number]     Ct Head Without Contrast    Result Date: 4/16/2018  Impression: No acute intracranial abnormality  Age related atrophy and findings most compatible with chronic small vessel ischemic disease   Workstation performed: [id number]     Mri Brain Wo Contrast    Result Date: 4/17/2018  Impression: 1   4 punctate foci of diffusion restriction indicative of scattered tiny acute/recent lacunar infarctions in 4 distinct vascular territories implicating a central embolic source  Does the patient have atrial fibrillation? 2  Advanced, chronic microangiopathy  Workstation performed: [id number]       Discharge Disposition: Home with [address]  Discharged With Lines: no    Test Results Pending at Discharge: None  Outpatient Follow-Up  As scheduled with PCP  Follow up with consulting providers  To be scheduled  Active Issues Requiring Follow-up   Embolic CVA  DM2    Code Status: Prior     Medications   See after visit summary for reconciled discharge medications provided to patient and family  Allergies  Allergies   Allergen Reactions    Other Chest Pain     IVP-listed as "chest pain" in previous chart, patient stated this occurred "a long time ago" but does not remember exactly occurred     Cephalosporins Chest Pain    Contrast [Iodinated Diagnostic Agents] Other (See Comments)     Flash pulm edema    Doxycycline Chest Pain    Levaquin [Levofloxacin] Chest Pain    Ondansetron Chest Pain     Prolonged QT    Toradol [Ketorolac Tromethamine] Chest Pain     Discharge Diet: cardiac diet and diabetic diet  Activity restrictions: none    [address] [facility] [address]  Patient presented with expressive aphasia as noted in HPI  MRI brain was performed, which documented multifocal stroke  The patient was maintained on therapeutic warfarin dosing with INR approximately 3 0  Due to recent TAVR and strong suspicion of embolic stroke a ROBB was performed  There was no evidence of residual thrombus or significant valvular pathology  Upon presentation patient was noted to have acute kidney injury, which was treated as prerenal   Furosemide medication was held  She received intravenous fluids overnight with improvement of creatinine    Due to recent AVR the patient is instructed to hold furosemide at discharge and continue salt restriction and fluid restriction  The patient was evaluated by physical and occupational therapy  There was no noted physical deficit, but concerns of memory effecting ADLs  She is to be under supervision at home, which will be provided by her daughter and son in law  Upon review of charting the neurology, cardiology and primary team felt that subtheraputic INR for several days may have contributed to thrombus formation in the setting of atrial fibrillation  The patient is therefore discharged home on Xarelto  She is instructed to begin taking the medication the day after discharge  Her INR is confirmed to be < 3 0 prior to discharge  She has scheduled follow up with her PCP as well as CT surgery  Presenting Problem/History of Present Illness  Principal Problem:    CVA (cerebral vascular accident) [facility])  Active Problems:    Essential hypertension    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 native heart with angina pectoris ([place] [address] )    Type 2 diabetes mellitus with complication, with long-term current use of insulin (HCC)    Hyperlipidemia    Gastroesophageal reflux disease without esophagitis    Moderate mitral stenosis    LEONARD (acute kidney injury) ([facility] )    Asymptomatic carotid artery stenosis, bilateral    Atherosclerosis of artery of extremity with ulceration ([facility])    Hx of CABG    Migraine headache    Paroxysmal atrial fibrillation (HCC)    Ulcer of toe of left foot (HCC)    Ulcer of toe of right foot (HCC)    Left bundle branch block (LBBB)    1st degree AV block    S/P TAVR (transcatheter aortic valve replacement)    Expressive aphasia  Resolved Problems:    * No resolved hospital problems  *    Discharge Condition: stable      Discharge  Statement   I spent 30 minutes minutes discharging the patient  This time was spent on the day of discharge  I had direct contact with the patient on the day of discharge   Additional documentation is required if more than 30 minutes were spent on discharge

## 2018-04-19 NOTE — PHYSICAL THERAPY NOTE
Physical Therapy Treatment Note     04/19/18 1110   Pain Assessment   Pain Assessment 0-10   Pain Score No Pain   Restrictions/Precautions   Weight Bearing Precautions Per Order No   Other Precautions Cognitive; Chair Alarm; Bed Alarm;Multiple lines; Fall Risk   General   Chart Reviewed Yes   Family/Caregiver Present No   Cognition   Overall Cognitive Status Impaired   Arousal/Participation Responsive   Attention Attends with cues to redirect   Orientation Level Oriented to person;Oriented to place;Oriented to situation   Memory Unable to assess   Following Commands Follows one step commands inconsistently   Subjective   Subjective states she feels OK  cooperative w/ session w/ mild coaxing   Transfers   Sit to Stand 5  Supervision   Additional items Assist x 1   Stand to Sit 5  Supervision   Additional items Assist x 1   Ambulation/Elevation   Gait pattern (slow, short step length)   Gait Assistance 5  Supervision   Additional items Assist x 1   Assistive Device Rolling walker   Distance 100'x2, 5 min standing rest   c/o fatigue p same, repositioned in chair to comfort   Balance   Static Sitting Normal   Dynamic Sitting Good   Static Standing Fair +   Dynamic Standing Fair   Ambulatory Fair   Endurance Deficit   Endurance Deficit Yes   Endurance Deficit Description fatigue, weakness   Activity Tolerance   Activity Tolerance Patient limited by fatigue;Treatment limited secondary to medical complications (Comment)   Nurse Made Aware yes   Assessment   Prognosis Good   Problem List Decreased strength;Decreased endurance; Impaired balance;Decreased mobility; Decreased cognition; Impaired judgement;Decreased safety awareness   Assessment Pt seen for session, gait and PT 1:1 activity x 25 min for setup, transfer, gait w several rests, repositioning in chair p session  Pt remains alert, w/ some confusion  c/o generally tired today, but willing to mobilize  No LOB, but overall slow gait pattern due to fatigue    OT noting pt appropriate to d/c home so provided they are OK with her going home from a cog perspective, agree w/ home w/ home therapies   Goals   Patient Goals none stated   STG Expiration Date 05/01/18   Treatment Day 1   Plan   Treatment/Interventions LE strengthening/ROM; Functional transfer training; Therapeutic exercise; Endurance training;Equipment eval/education;Patient/family training;Bed mobility;Gait training   Progress Progressing toward goals   PT Frequency 5x/wk   Recommendation   Recommendation (home w/ home therapies)   Equipment Recommended Walker   PT - OK to Discharge Yes  (to hoem w/ home therapies when stable)     Cheryal Party PT, DPT CSRS

## 2018-04-20 ENCOUNTER — TELEPHONE (OUTPATIENT)
Dept: NEUROLOGY | Facility: CLINIC | Age: 78
End: 2018-04-20

## 2018-04-23 ENCOUNTER — TRANSITIONAL CARE MANAGEMENT (OUTPATIENT)
Dept: INTERNAL MEDICINE CLINIC | Facility: CLINIC | Age: 78
End: 2018-04-23

## 2018-04-23 ENCOUNTER — TELEPHONE (OUTPATIENT)
Dept: INTERNAL MEDICINE CLINIC | Facility: CLINIC | Age: 78
End: 2018-04-23

## 2018-04-23 RX ORDER — POLYETHYLENE GLYCOL 3350 17 G/17G
17 POWDER, FOR SOLUTION ORAL DAILY PRN
Status: ON HOLD | COMMUNITY
End: 2018-12-05 | Stop reason: ALTCHOICE

## 2018-04-23 RX ORDER — GUAIFENESIN/DEXTROMETHORPHAN 100-10MG/5
10 SYRUP ORAL EVERY 4 HOURS PRN
COMMUNITY
End: 2018-05-07 | Stop reason: HOSPADM

## 2018-04-23 RX ORDER — WARFARIN SODIUM 3 MG/1
TABLET ORAL
COMMUNITY
End: 2018-04-23 | Stop reason: CLARIF

## 2018-04-23 RX ORDER — FUROSEMIDE 40 MG/1
40 TABLET ORAL 2 TIMES DAILY
COMMUNITY
End: 2018-05-26 | Stop reason: HOSPADM

## 2018-04-23 RX ORDER — FLUTICASONE PROPIONATE 50 MCG
1 SPRAY, SUSPENSION (ML) NASAL DAILY
COMMUNITY

## 2018-04-23 RX ORDER — POTASSIUM CHLORIDE 750 MG/1
10 TABLET, EXTENDED RELEASE ORAL 2 TIMES DAILY
COMMUNITY
End: 2018-05-29 | Stop reason: ALTCHOICE

## 2018-04-23 RX ORDER — PANTOPRAZOLE SODIUM 40 MG/1
40 TABLET, DELAYED RELEASE ORAL DAILY
COMMUNITY
End: 2018-05-07 | Stop reason: HOSPADM

## 2018-04-24 ENCOUNTER — OFFICE VISIT (OUTPATIENT)
Dept: INTERNAL MEDICINE CLINIC | Facility: CLINIC | Age: 78
End: 2018-04-24
Payer: MEDICARE

## 2018-04-24 VITALS
HEART RATE: 98 BPM | HEIGHT: 64 IN | OXYGEN SATURATION: 98 % | WEIGHT: 166.4 LBS | SYSTOLIC BLOOD PRESSURE: 136 MMHG | DIASTOLIC BLOOD PRESSURE: 74 MMHG | TEMPERATURE: 98.8 F | BODY MASS INDEX: 28.41 KG/M2

## 2018-04-24 DIAGNOSIS — E13.621 FOOT ULCER DUE TO SECONDARY DM (HCC): Primary | ICD-10-CM

## 2018-04-24 DIAGNOSIS — L97.509 FOOT ULCER DUE TO SECONDARY DM (HCC): Primary | ICD-10-CM

## 2018-04-24 DIAGNOSIS — L97.529 ULCER OF TOE OF LEFT FOOT, UNSPECIFIED ULCER STAGE (HCC): ICD-10-CM

## 2018-04-24 DIAGNOSIS — L97.519 ULCER OF TOE OF RIGHT FOOT, UNSPECIFIED ULCER STAGE (HCC): ICD-10-CM

## 2018-04-24 DIAGNOSIS — J06.9 URI, ACUTE: ICD-10-CM

## 2018-04-24 DIAGNOSIS — I63.81 MULTIPLE LACUNAR INFARCTS (HCC): ICD-10-CM

## 2018-04-24 PROCEDURE — 99495 TRANSJ CARE MGMT MOD F2F 14D: CPT | Performed by: INTERNAL MEDICINE

## 2018-04-24 RX ORDER — LORATADINE 10 MG/1
10 TABLET ORAL DAILY
Qty: 30 TABLET | Refills: 0 | OUTPATIENT
Start: 2018-04-24

## 2018-04-24 NOTE — ASSESSMENT & PLAN NOTE
Continue with current regimen of aspirin 81 mg daily, atorvastatin 80 mg daily, and Xarelto for anticoagulation  Advised she schedule appointment to establish care/ follow up with Neurology  She has an appointment with her cardiologist next week  Plan will be to continue with PT/OT, and speech therapy

## 2018-04-24 NOTE — ASSESSMENT & PLAN NOTE
Advised she follow up with her podiatrist and receive new orthotic shoes  Will defer on referring to vascular surgery at this time as pulses are intact bilaterally

## 2018-04-24 NOTE — PROGRESS NOTES
Assessment/Plan:    Multiple lacunar infarcts (HCC)    Continue with current regimen of aspirin 81 mg daily, atorvastatin 80 mg daily, and Xarelto for anticoagulation  Advised she schedule appointment to establish care/ follow up with Neurology  She has an appointment with her cardiologist next week  Plan will be to continue with PT/OT, and speech therapy  Foot ulcer due to secondary DM [facility])   Advised she follow up with her podiatrist and receive new orthotic shoes  Will defer on referring to vascular surgery at this time as pulses are intact bilaterally  Diagnoses and all orders for this visit:    Foot ulcer due to secondary DM ([facility] [address] )    Ulcer of toe of left foot, unspecified ulcer stage (HCC)    Ulcer of toe of right foot, unspecified ulcer stage ([facility] [address] )    Multiple lacunar infarcts (HCC)          Subjective:      Patient ID: Mitzy Soriano is a [de-identified] y o  female  38-year-old female is seen today for transition of care after which she was hospitalized at [facility] from 04/16/2018 until 04/18/2018  She was admitted for new onset expressive aphasia to which she underwent neurologic and cardiac evaluation  After extensive laboratory and imaging studies, she was found to have multiple foci of lacunar region  This was attributed to failed warfarin therapy to which she was transitioned to Xarelto for anticoagulation  She was also discharged to home with home PT  Yesterday was the initial evaluation appointment for physical therapy  She is to undergo PT biweekly as well as OT and speech therapy  Her son, who is present during appointment, reports that her symptoms have improves somewhat, although not at baseline  She is to schedule an appointment with Neurology and in fact has an appointment with Cardiology next week  She currently lives with her daughter and son-in-law who are helping with managing medications and her care        It is also worth noting that she was found to have multiple ulcerations of her feet which are chronic in nature and likely attributed to orthotics  She is scheduled to see her podiatrist and to receive new orthotics use  The following portions of the patient's history were reviewed and updated as appropriate: allergies, current medications, past family history, past medical history, past social history, past surgical history and problem list     Date and time hospital follow up call was made:  4/23/2018 10:37 AM  Hospital care reviewed:  Records reviewed  Patient was hopsitalized at:  [facility]  Date of admission:  4/16/18  Date of discharge:  4/18/18  Diagnosis:  severe aortic stenosis, s/p TAVR, CVA, HTN, CAD, DM2, HLD, GERD, moderate mitral stenosis, LEONARD, asymptomatic bilateral carotid artery stenosis, atherosckerosis of artery of extremity w/ ulceration, hx CABG, migraine, PAF, ulcer of toe of right and left foot, LBBB, expressive aphasia  Were the patients medicaitons reviewed and updated:  No  Post hospital issues:  Reduced activity, Aftercare intervention  Should patient be enrolled in anticoag monitoring?:  Yes  Scheduled for follow up?:  Yes  Did you obtain your prescribed medications:  Yes  Do you need help managing your perscriptions or medications:  No  Is transportation to your appointments needed:  No  Living Arrangements:  Alone  Are you recieving home care services:  Yes  Types of home care services:  Home PT, Nurse visit  Interperter language line required?:  No  Comments:  Pt discharged to home with Home Care services  Family and visiting nurse have concerns about pt being by herself  Visiting Nurse iwmario be faxing orders  SUSIE scheduled 4/24/18 w/ TO in NH  Review of Systems   Constitutional: Positive for fatigue  Negative for activity change, appetite change, chills, diaphoresis and fever  HENT: Negative for congestion, postnasal drip, rhinorrhea, sinus pain, sinus pressure, sneezing and sore throat      Eyes: Negative for visual disturbance  Respiratory: Negative for apnea, cough, choking, chest tightness, shortness of breath and wheezing  Cardiovascular: Negative for chest pain, palpitations and leg swelling  Gastrointestinal: Negative for abdominal distention, abdominal pain, anal bleeding, blood in stool, constipation, diarrhea, nausea and vomiting  Endocrine: Negative for cold intolerance and heat intolerance  Genitourinary: Negative for difficulty urinating, dysuria and hematuria  Musculoskeletal: Negative  Skin: Negative  Neurological: Negative for dizziness, weakness, light-headedness, numbness and headaches  Hematological: Negative for adenopathy  Psychiatric/Behavioral: Negative for agitation, sleep disturbance and suicidal ideas  All other systems reviewed and are negative          Past Medical History:   Diagnosis Date    Altered mental status     Anticoagulated     Coumadin for Aflutter    Asthma     Atrial flutter (HCC)     maintained on coumadin anticoag    CAD (coronary artery disease)     Candidiasis     Carotid stenosis, bilateral     Cataract     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) ([facility])     Chronic fatigue syndrome     Chronic low back pain     Concussion w loss of consciousness of unsp duration, init     Coronary artery disease     Diabetes mellitus (HCC)     type 2, insulin dependent    DJD (degenerative joint disease)     GERD (gastroesophageal reflux disease)     Herpes zoster     HLD (hyperlipidemia)     HTN (hypertension)     Hyperlipidemia     Hypothyroidism     Irritable bowel syndrome     Lumbar radiculopathy     Lyme disease     Dx in hospital 8/2011    Lyme disease     MI [facility]ocardial infarction) ([facility] [address] )     Migraines     Muscle spasm     Non-neoplastic nevus     Nontoxic multinodular goiter     OA (osteoarthritis)     Osteoarthritis     Other chronic pain     PAD (peripheral artery disease) ([facility] [address] )     Palpitations     Pseudogout     Spinal stenosis     Transient cerebral ischemia     Trigger ring finger     Viral gastroenteritis          Current Outpatient Prescriptions:     acetaminophen (TYLENOL) 650 mg CR tablet, Take 1 tablet (650 mg total) by mouth every 8 (eight) hours as needed for mild pain Do not take in conjunction with Percocet , Disp: 90 tablet, Rfl: 0    albuterol (VENTOLIN HFA) 90 mcg/act inhaler, Inhale 108 mcg 2 (two) times a day as needed 2 puffs bid PRN, Disp: , Rfl:     aspirin 81 MG tablet, Take 81 mg by mouth daily, Disp: , Rfl:     atorvastatin (LIPITOR) 80 mg tablet, Take 80 mg by mouth daily, Disp: , Rfl:     calcium carbonate (TUMS) 500 mg chewable tablet, Chew 2 tablets 3 (three) times a day  , Disp: , Rfl:     cholecalciferol (VITAMIN D3) 1,000 units tablet, Take 1,000 Units by mouth daily  , Disp: , Rfl:     dextromethorphan-guaiFENesin (ROBITUSSIN DM)  mg/5 mL syrup, Take 10 mL by mouth every 4 (four) hours as needed for cough, Disp: , Rfl:     diphenhydrAMINE (BANOPHEN) 50 mg capsule, Take 50 mg by mouth daily, Disp: , Rfl:     docusate sodium (COLACE) 100 mg capsule, Take 1 capsule (100 mg total) by mouth 2 (two) times a day, Disp: 10 capsule, Rfl: 0    esomeprazole (NexIUM) 20 mg capsule, Take 20 mg by mouth daily in the early morning  , Disp: , Rfl:     fluticasone (FLONASE) 50 mcg/act nasal spray, 1 spray into each nostril daily, Disp: , Rfl:     furosemide (LASIX) 40 mg tablet, Take 40 mg by mouth 2 (two) times a day, Disp: , Rfl:     glucose blood test strip, 1 each by Other route 4 (four) times a day (before meals and at bedtime) Use as instructed, Disp: , Rfl:     LANTUS 100 UNIT/ML subcutaneous injection, Inject 20 Units under the skin 2 (two) times a day (Patient taking differently: Inject 30 Units under the skin 2 (two) times a day  ), Disp: 10 mL, Rfl: 0    levothyroxine 100 mcg tablet, Take 100 mcg by mouth daily, Disp: , Rfl:     lisinopril (ZESTRIL) 5 mg tablet, Take 2 5 mg by mouth daily at bedtime  , Disp: , Rfl:     loratadine (CLARITIN) 10 mg tablet, Take 1 tablet (10 mg total) by mouth daily, Disp: 30 tablet, Rfl: 0    metFORMIN (GLUCOPHAGE) 1000 MG tablet, 1,000 mg 2 (two) times a day, Disp: , Rfl:     metoprolol tartrate (LOPRESSOR) 100 mg tablet, Take 1 tablet (100 mg total) by mouth every 12 (twelve) hours, Disp: , Rfl: 0    Mometasone Furoate (ASMANEX HFA) 100 MCG/ACT AERO, Inhale 2 puffs once daily, Disp: 1 Inhaler, Rfl: 5    nitroglycerin (NITROSTAT) 0 4 mg SL tablet, Place 0 4 mg under the tongue every 3 (three) minutes as needed, Disp: , Rfl:     pantoprazole (PROTONIX) 40 mg tablet, Take 40 mg by mouth daily, Disp: , Rfl:     polyethylene glycol (MIRALAX) 17 g packet, Take 17 g by mouth daily, Disp: , Rfl:     potassium chloride (K-DUR,KLOR-CON) 10 mEq tablet, Take 10 mEq by mouth 2 (two) times a day, Disp: , Rfl:     rivaroxaban (XARELTO) 20 mg tablet, Take 1 tablet (20 mg total) by mouth daily, Disp: , Rfl: 0    Allergies   Allergen Reactions    Other Chest Pain     IVP-listed as "chest pain" in previous chart, patient stated this occurred "a long time ago" but does not remember exactly occurred     Cephalosporins Chest Pain    Contrast [Iodinated Diagnostic Agents] Other (See Comments)     Flash pulm edema    Doxycycline Chest Pain    Levaquin [Levofloxacin] Chest Pain    Ondansetron Chest Pain     Prolonged QT    Toradol [Ketorolac Tromethamine] Chest Pain       Social History   Past Surgical History:   Procedure Laterality Date    APPENDECTOMY      ARTERIOGRAM  12/19/2017    CARDIAC CATHETERIZATION      CHOLECYSTECTOMY      COLONOSCOPY      CORONARY ARTERY BYPASS GRAFT  2004    LUMBAR LAMINECTOMY      LA ECHO TRANSESOPHAG R-T 2D W/PRB IMG ACQUISJ I&R N/A 4/3/2018    Procedure: TRANSESOPHAGEAL ECHOCARDIOGRAM (ROBB);   Surgeon: Cheryle Paulino DO;  [facility];  Service: Cardiac Surgery    LA REPLACE AORTIC VALVE OPENFEMORAL ARTERY APPROACH N/A 4/3/2018    Procedure: REPLACEMENT AORTIC VALVE TRANSCATHETER (TAVR) TRANSFEMORAL w/ 26MM IVY YEVGENIY S3 VALVE;  Surgeon: Anabela Irizarry DO;  Location: [facility];  Service: Cardiac Surgery    THYROIDECTOMY      TOTAL ABDOMINAL HYSTERECTOMY W/ BILATERAL SALPINGOOPHORECTOMY       Family History   Problem Relation Age of Onset    Cancer Mother     Stroke Mother     Cancer Father     Heart disease Father     Breast cancer Sister     Diabetes Daughter      Passed away January 2017        Objective:  /74 (BP Location: Left arm, Patient Position: Sitting, Cuff Size: Adult)   Pulse 98   Temp 98 8 °F (37 1 °C) (Oral)   Ht 5' 4" (1 626 m)   Wt 75 5 kg (166 lb 6 4 oz)   LMP  (LMP Unknown)   SpO2 98%   BMI 28 56 kg/m²     Recent Results (from the past 1344 hour(s))   POCT Blood Gas (CG8+)    Collection Time: 03/15/18  8:40 AM   Result Value Ref Range    pH, Art i-STAT 7 446 7 350 - 7 450    pCO2, Art i-STAT 34 1 (L) 36 0 - 44 0 mm HG    pO2, ART i-STAT 75 0 75 0 - 129 0 mm HG    BE, i-STAT -1 -2 - 3 mmol/L    HCO3, Art i-STAT 23 5 22 0 - 28 0 mmol/L    CO2, i-STAT 25 21 - 32 mmol/L    O2 Sat, i-STAT 96 95 - 98 %    SODIUM, I-STAT 138 136 - 145 mmol/l    Potassium, i-STAT 4 0 3 5 - 5 3 mmol/L    Calcium, Ionized i-STAT 0 91 (L) 1 12 - 1 32 mmol/L    Hct, i-STAT 34 (L) 34 8 - 46 1 %    Hgb, i-STAT 11 6 11 5 - 15 4 g/dl    Glucose, i-STAT 317 (H) 65 - 140 mg/dl    POC FIO2 21 L    Specimen Type ARTERIAL     SITE Right Radial     ZAY TEST Postive Zay Test    ECG 12 lead    Collection Time: 03/15/18 11:13 AM   Result Value Ref Range    Ventricular Rate 123 BPM    Atrial Rate 127 BPM    NE Interval  ms    QRSD Interval 156 ms    QT Interval 390 ms    QTC Interval 558 ms    P Axis  degrees    QRS Axis -69 degrees    T Wave Axis 97 degrees   Basic metabolic panel    Collection Time: 03/15/18 11:21 AM   Result Value Ref Range    Sodium 134 (L) 136 - 145 mmol/L    Potassium 3 3 (L) 3 5 - 5 3 mmol/L    Chloride 99 (L) 100 - 108 mmol/L    CO2 19 (L) 21 - 32 mmol/L    Anion Gap 16 (H) 4 - 13 mmol/L    BUN 28 (H) 5 - 25 mg/dL    Creatinine 1 40 (H) 0 60 - 1 30 mg/dL    Glucose 389 (H) 65 - 140 mg/dL    Calcium 7 4 (L) 8 3 - 10 1 mg/dL    eGFR 36 ml/min/1 73sq m   CBC and differential    Collection Time: 03/15/18 11:21 AM   Result Value Ref Range    WBC 15 45 (H) 4 31 - 10 16 Thousand/uL    RBC 5 73 (H) 3 81 - 5 12 Million/uL    Hemoglobin 12 2 11 5 - 15 4 g/dL    Hematocrit 40 1 34 8 - 46 1 %    MCV 70 (L) 82 - 98 fL    MCH 21 3 (L) 26 8 - 34 3 pg    MCHC 30 4 (L) 31 4 - 37 4 g/dL    RDW 17 9 (H) 11 6 - 15 1 %    MPV 10 7 8 9 - 12 7 fL    Platelets 472 (H) 767 - 390 Thousands/uL    nRBC 0 /100 WBCs    Neutrophils Relative 81 (H) 43 - 75 %    Lymphocytes Relative 18 14 - 44 %    Monocytes Relative 1 (L) 4 - 12 %    Eosinophils Relative 0 0 - 6 %    Basophils Relative 0 0 - 1 %    Neutrophils Absolute 12 39 (H) 1 85 - 7 62 Thousands/µL    Lymphocytes Absolute 2 79 0 60 - 4 47 Thousands/µL    Monocytes Absolute 0 19 0 17 - 1 22 Thousand/µL    Eosinophils Absolute 0 02 0 00 - 0 61 Thousand/µL    Basophils Absolute 0 02 0 00 - 0 10 Thousands/µL   NT-BNP PRO    Collection Time: 03/15/18  1:31 PM   Result Value Ref Range    NT-proBNP 12,681 (H) <450 pg/mL   Troponin I    Collection Time: 03/15/18  1:31 PM   Result Value Ref Range    Troponin I 0 56 (H) <=0 04 ng/mL   Protime-INR    Collection Time: 03/15/18  1:31 PM   Result Value Ref Range    Protime 24 1 (H) 12 1 - 14 4 seconds    INR 2 14 (H) 0 86 - 1 16   Troponin I    Collection Time: 03/15/18  4:29 PM   Result Value Ref Range    Troponin I 1 61 (H) <=0 04 ng/mL   Fingerstick Glucose (POCT)    Collection Time: 03/15/18  5:39 PM   Result Value Ref Range    POC Glucose 253 (H) 65 - 140 mg/dl   Troponin I    Collection Time: 03/15/18  7:38 PM   Result Value Ref Range    Troponin I 1 98 (H) <=0 04 ng/mL   Fingerstick Glucose (POCT)    Collection Time: 03/15/18  7:40 PM   Result Value Ref Range    POC Glucose 220 (H) 65 - 140 mg/dl   Fingerstick Glucose (POCT)    Collection Time: 03/15/18 10:08 PM   Result Value Ref Range    POC Glucose 192 (H) 65 - 140 mg/dl   Troponin I    Collection Time: 03/15/18 11:02 PM   Result Value Ref Range    Troponin I 2 20 (H) <=0 04 ng/mL   Troponin I    Collection Time: 03/16/18  2:08 AM   Result Value Ref Range    Troponin I 2 31 (H) <=0 04 ng/mL   CBC with differential    Collection Time: 03/16/18  5:06 AM   Result Value Ref Range    WBC 14 72 (H) 4 31 - 10 16 Thousand/uL    RBC 4 53 3 81 - 5 12 Million/uL    Hemoglobin 9 6 (L) 11 5 - 15 4 g/dL    Hematocrit 30 7 (L) 34 8 - 46 1 %    MCV 68 (L) 82 - 98 fL    MCH 21 2 (L) 26 8 - 34 3 pg    MCHC 31 3 (L) 31 4 - 37 4 g/dL    RDW 17 9 (H) 11 6 - 15 1 %    MPV 10 1 8 9 - 12 7 fL    Platelets 442 748 - 653 Thousands/uL    nRBC 0 /100 WBCs    Neutrophils Relative 77 (H) 43 - 75 %    Lymphocytes Relative 14 14 - 44 %    Monocytes Relative 9 4 - 12 %    Eosinophils Relative 0 0 - 6 %    Basophils Relative 0 0 - 1 %    Neutrophils Absolute 11 17 (H) 1 85 - 7 62 Thousands/µL    Lymphocytes Absolute 2 08 0 60 - 4 47 Thousands/µL    Monocytes Absolute 1 36 (H) 0 17 - 1 22 Thousand/µL    Eosinophils Absolute 0 05 0 00 - 0 61 Thousand/µL    Basophils Absolute 0 02 0 00 - 0 10 Thousands/µL   Comprehensive metabolic panel    Collection Time: 03/16/18  5:06 AM   Result Value Ref Range    Sodium 141 136 - 145 mmol/L    Potassium 4 5 3 5 - 5 3 mmol/L    Chloride 105 100 - 108 mmol/L    CO2 28 21 - 32 mmol/L    Anion Gap 8 4 - 13 mmol/L    BUN 30 (H) 5 - 25 mg/dL    Creatinine 0 83 0 60 - 1 30 mg/dL    Glucose 122 65 - 140 mg/dL    Calcium 7 0 (L) 8 3 - 10 1 mg/dL    AST 32 5 - 45 U/L    ALT 23 12 - 78 U/L    Alkaline Phosphatase 65 46 - 116 U/L    Total Protein 6 7 6 4 - 8 2 g/dL    Albumin 3 1 (L) 3 5 - 5 0 g/dL    Total Bilirubin 1 46 (H) 0 20 - 1 00 mg/dL    eGFR 67 ml/min/1 73sq m   Magnesium    Collection Time: 03/16/18  5:06 AM   Result Value Ref Range    Magnesium 1 2 (L) 1 6 - 2 6 mg/dL   Phosphorus    Collection Time: 03/16/18  5:06 AM   Result Value Ref Range    Phosphorus 4 5 (H) 2 3 - 4 1 mg/dL   Fingerstick Glucose (POCT)    Collection Time: 03/16/18  5:06 AM   Result Value Ref Range    POC Glucose 139 65 - 140 mg/dl   Troponin I    Collection Time: 03/16/18  5:08 AM   Result Value Ref Range    Troponin I 2 02 (H) <=0 04 ng/mL   ECG 12 lead    Collection Time: 03/16/18  7:12 AM   Result Value Ref Range    Ventricular Rate 73 BPM    Atrial Rate 73 BPM    IL Interval 296 ms    QRSD Interval 146 ms    QT Interval 471 ms    QTC Interval 520 ms    P Axis -5 degrees    QRS Axis -63 degrees    T Wave Axis 131 degrees   Protime-INR    Collection Time: 03/16/18  7:47 AM   Result Value Ref Range    Protime 23 4 (H) 12 1 - 14 4 seconds    INR 2 06 (H) 0 86 - 1 16   Fingerstick Glucose (POCT)    Collection Time: 03/16/18  7:47 AM   Result Value Ref Range    POC Glucose 131 65 - 140 mg/dl   Fingerstick Glucose (POCT)    Collection Time: 03/16/18 12:15 PM   Result Value Ref Range    POC Glucose 208 (H) 65 - 140 mg/dl   Hemoglobin    Collection Time: 03/16/18  3:08 PM   Result Value Ref Range    Hemoglobin 9 8 (L) 11 5 - 15 4 g/dL   Magnesium    Collection Time: 03/16/18  3:08 PM   Result Value Ref Range    Magnesium 3 3 (H) 1 6 - 2 6 mg/dL   Fingerstick Glucose (POCT)    Collection Time: 03/16/18  5:34 PM   Result Value Ref Range    POC Glucose 218 (H) 65 - 140 mg/dl   Fingerstick Glucose (POCT)    Collection Time: 03/16/18  8:59 PM   Result Value Ref Range    POC Glucose 217 (H) 65 - 140 mg/dl   CBC and differential    Collection Time: 03/17/18  4:36 AM   Result Value Ref Range    WBC 10 40 (H) 4 31 - 10 16 Thousand/uL    RBC 4 55 3 81 - 5 12 Million/uL    Hemoglobin 9 5 (L) 11 5 - 15 4 g/dL    Hematocrit 30 9 (L) 34 8 - 46 1 %    MCV 68 (L) 82 - 98 fL    MCH 20 9 (L) 26 8 - 34 3 pg    MCHC 30 7 (L) 31 4 - 37 4 g/dL    RDW 18 0 (H) 11 6 - 15 1 %    MPV 10 1 8 9 - 12 7 fL    Platelets 143 292 - 445 Thousands/uL    nRBC 0 /100 WBCs    Neutrophils Relative 65 43 - 75 %    Lymphocytes Relative 22 14 - 44 %    Monocytes Relative 9 4 - 12 %    Eosinophils Relative 3 0 - 6 %    Basophils Relative 1 0 - 1 %    Neutrophils Absolute 6 78 1 85 - 7 62 Thousands/µL    Lymphocytes Absolute 2 28 0 60 - 4 47 Thousands/µL    Monocytes Absolute 0 92 0 17 - 1 22 Thousand/µL    Eosinophils Absolute 0 35 0 00 - 0 61 Thousand/µL    Basophils Absolute 0 05 0 00 - 0 10 Thousands/µL   Basic metabolic panel    Collection Time: 03/17/18  4:36 AM   Result Value Ref Range    Sodium 137 136 - 145 mmol/L    Potassium 4 0 3 5 - 5 3 mmol/L    Chloride 102 100 - 108 mmol/L    CO2 30 21 - 32 mmol/L    Anion Gap 5 4 - 13 mmol/L    BUN 32 (H) 5 - 25 mg/dL    Creatinine 0 86 0 60 - 1 30 mg/dL    Glucose 156 (H) 65 - 140 mg/dL    Calcium 7 1 (L) 8 3 - 10 1 mg/dL    eGFR 64 ml/min/1 73sq m   Magnesium    Collection Time: 03/17/18  4:36 AM   Result Value Ref Range    Magnesium 2 3 1 6 - 2 6 mg/dL   Protime-INR    Collection Time: 03/17/18  4:36 AM   Result Value Ref Range    Protime 22 2 (H) 12 1 - 14 4 seconds    INR 1 93 (H) 0 86 - 1 16   Hemoglobin A1C w/ EAG Estimation    Collection Time: 03/17/18  4:36 AM   Result Value Ref Range    Hemoglobin A1C 7 5 (H) 4 2 - 6 3 %     mg/dl   Fingerstick Glucose (POCT)    Collection Time: 03/17/18  6:04 AM   Result Value Ref Range    POC Glucose 139 65 - 140 mg/dl   ECG 12 lead    Collection Time: 03/17/18  7:46 AM   Result Value Ref Range    Ventricular Rate 81 BPM    Atrial Rate 81 BPM    TN Interval 294 ms    QRSD Interval 150 ms    QT Interval 450 ms    QTC Interval 522 ms    P Axis -14 degrees    QRS Axis -50 degrees    T Wave Axis 128 degrees   Fingerstick Glucose (POCT)    Collection Time: 03/17/18 10:45 AM   Result Value Ref Range    POC Glucose 256 (H) 65 - 140 mg/dl Fingerstick Glucose (POCT)    Collection Time: 03/17/18  3:37 PM   Result Value Ref Range    POC Glucose 207 (H) 65 - 140 mg/dl   Fingerstick Glucose (POCT)    Collection Time: 03/17/18  9:05 PM   Result Value Ref Range    POC Glucose 250 (H) 65 - 140 mg/dl   Basic metabolic panel    Collection Time: 03/18/18  4:52 AM   Result Value Ref Range    Sodium 136 136 - 145 mmol/L    Potassium 3 8 3 5 - 5 3 mmol/L    Chloride 101 100 - 108 mmol/L    CO2 29 21 - 32 mmol/L    Anion Gap 6 4 - 13 mmol/L    BUN 29 (H) 5 - 25 mg/dL    Creatinine 0 89 0 60 - 1 30 mg/dL    Glucose 157 (H) 65 - 140 mg/dL    Calcium 7 4 (L) 8 3 - 10 1 mg/dL    eGFR 62 ml/min/1 73sq m   Fingerstick Glucose (POCT)    Collection Time: 03/18/18  6:06 AM   Result Value Ref Range    POC Glucose 144 (H) 65 - 140 mg/dl   Fingerstick Glucose (POCT)    Collection Time: 03/18/18 10:32 AM   Result Value Ref Range    POC Glucose 306 (H) 65 - 140 mg/dl   Fingerstick Glucose (POCT)    Collection Time: 03/18/18  3:36 PM   Result Value Ref Range    POC Glucose 371 (H) 65 - 140 mg/dl   Fingerstick Glucose (POCT)    Collection Time: 03/18/18  8:55 PM   Result Value Ref Range    POC Glucose 305 (H) 65 - 140 mg/dl   CBC    Collection Time: 03/19/18  4:33 AM   Result Value Ref Range    WBC 11 67 (H) 4 31 - 10 16 Thousand/uL    RBC 5 01 3 81 - 5 12 Million/uL    Hemoglobin 10 3 (L) 11 5 - 15 4 g/dL    Hematocrit 34 4 (L) 34 8 - 46 1 %    MCV 69 (L) 82 - 98 fL    MCH 20 6 (L) 26 8 - 34 3 pg    MCHC 29 9 (L) 31 4 - 37 4 g/dL    RDW 17 6 (H) 11 6 - 15 1 %    Platelets 932 288 - 471 Thousands/uL    MPV 10 8 8 9 - 12 7 fL   Basic metabolic panel    Collection Time: 03/19/18  4:33 AM   Result Value Ref Range    Sodium 137 136 - 145 mmol/L    Potassium 4 2 3 5 - 5 3 mmol/L    Chloride 102 100 - 108 mmol/L    CO2 25 21 - 32 mmol/L    Anion Gap 10 4 - 13 mmol/L    BUN 31 (H) 5 - 25 mg/dL    Creatinine 0 93 0 60 - 1 30 mg/dL    Glucose 362 (H) 65 - 140 mg/dL    Calcium 8 1 (L) 8 3 - 10 1 mg/dL    eGFR 59 ml/min/1 73sq m   Protime-INR    Collection Time: 03/19/18  4:33 AM   Result Value Ref Range    Protime 16 0 (H) 12 1 - 14 4 seconds    INR 1 27 (H) 0 86 - 1 16   Fingerstick Glucose (POCT)    Collection Time: 03/19/18  5:55 AM   Result Value Ref Range    POC Glucose 305 (H) 65 - 140 mg/dl   Fingerstick Glucose (POCT)    Collection Time: 03/19/18  3:35 PM   Result Value Ref Range    POC Glucose 264 (H) 65 - 140 mg/dl   Fingerstick Glucose (POCT)    Collection Time: 03/19/18  8:09 PM   Result Value Ref Range    POC Glucose 407 (H) 65 - 140 mg/dl   Fingerstick Glucose (POCT)    Collection Time: 03/19/18  9:11 PM   Result Value Ref Range    POC Glucose 374 (H) 65 - 140 mg/dl   Fingerstick Glucose (POCT)    Collection Time: 03/20/18 12:06 AM   Result Value Ref Range    POC Glucose 291 (H) 65 - 140 mg/dl   CBC    Collection Time: 03/20/18  4:50 AM   Result Value Ref Range    WBC 14 90 (H) 4 31 - 10 16 Thousand/uL    RBC 4 23 3 81 - 5 12 Million/uL    Hemoglobin 9 0 (L) 11 5 - 15 4 g/dL    Hematocrit 29 5 (L) 34 8 - 46 1 %    MCV 70 (L) 82 - 98 fL    MCH 21 3 (L) 26 8 - 34 3 pg    MCHC 30 5 (L) 31 4 - 37 4 g/dL    RDW 17 7 (H) 11 6 - 15 1 %    Platelets 053 761 - 028 Thousands/uL    MPV 10 5 8 9 - 12 7 fL   Basic metabolic panel    Collection Time: 03/20/18  4:50 AM   Result Value Ref Range    Sodium 141 136 - 145 mmol/L    Potassium 3 9 3 5 - 5 3 mmol/L    Chloride 109 (H) 100 - 108 mmol/L    CO2 25 21 - 32 mmol/L    Anion Gap 7 4 - 13 mmol/L    BUN 27 (H) 5 - 25 mg/dL    Creatinine 0 81 0 60 - 1 30 mg/dL    Glucose 190 (H) 65 - 140 mg/dL    Calcium 7 4 (L) 8 3 - 10 1 mg/dL    eGFR 69 ml/min/1 73sq m   Protime-INR    Collection Time: 03/20/18  4:50 AM   Result Value Ref Range    Protime 17 2 (H) 12 1 - 14 4 seconds    INR 1 40 (H) 0 86 - 1 16   Fingerstick Glucose (POCT)    Collection Time: 03/20/18  6:05 AM   Result Value Ref Range    POC Glucose 175 (H) 65 - 140 mg/dl   Fingerstick Glucose (POCT)    Collection Time: 03/20/18 11:17 AM   Result Value Ref Range    POC Glucose 265 (H) 65 - 140 mg/dl   Protime-INR    Collection Time: 03/23/18 12:00 AM   Result Value Ref Range    INR 1 34 (A) 0 86 - 1 16   Protime-INR    Collection Time: 03/23/18  2:47 PM   Result Value Ref Range    Protime 16 7 (H) 12 1 - 14 4 seconds    INR 1 34 (H) 0 86 - 1 16   Protime-INR    Collection Time: 03/29/18 12:00 AM   Result Value Ref Range    INR 2 37 (A) 0 86 - 1 16   Comprehensive metabolic panel    Collection Time: 03/29/18  9:24 AM   Result Value Ref Range    Sodium 137 136 - 145 mmol/L    Potassium 4 7 3 5 - 5 3 mmol/L    Chloride 100 100 - 108 mmol/L    CO2 29 21 - 32 mmol/L    Anion Gap 8 4 - 13 mmol/L    BUN 29 (H) 5 - 25 mg/dL    Creatinine 0 98 0 60 - 1 30 mg/dL    Glucose, Fasting 167 (H) 65 - 99 mg/dL    Calcium 8 2 (L) 8 3 - 10 1 mg/dL    AST 25 5 - 45 U/L    ALT 24 12 - 78 U/L    Alkaline Phosphatase 71 46 - 116 U/L    Total Protein 7 0 6 4 - 8 2 g/dL    Albumin 3 7 3 5 - 5 0 g/dL    Total Bilirubin 0 64 0 20 - 1 00 mg/dL    eGFR 55 ml/min/1 73sq m   CBC and differential    Collection Time: 03/29/18  9:24 AM   Result Value Ref Range    WBC 8 88 4 31 - 10 16 Thousand/uL    RBC 4 89 3 81 - 5 12 Million/uL    Hemoglobin 10 2 (L) 11 5 - 15 4 g/dL    Hematocrit 34 1 (L) 34 8 - 46 1 %    MCV 70 (L) 82 - 98 fL    MCH 20 9 (L) 26 8 - 34 3 pg    MCHC 29 9 (L) 31 4 - 37 4 g/dL    RDW 18 8 (H) 11 6 - 15 1 %    MPV 11 8 8 9 - 12 7 fL    Platelets 658 648 - 326 Thousands/uL    nRBC 0 /100 WBCs    Neutrophils Relative 63 43 - 75 %    Lymphocytes Relative 22 14 - 44 %    Monocytes Relative 10 4 - 12 %    Eosinophils Relative 4 0 - 6 %    Basophils Relative 1 0 - 1 %    Neutrophils Absolute 5 67 1 85 - 7 62 Thousands/µL    Lymphocytes Absolute 1 94 0 60 - 4 47 Thousands/µL    Monocytes Absolute 0 87 0 17 - 1 22 Thousand/µL    Eosinophils Absolute 0 34 0 00 - 0 61 Thousand/µL    Basophils Absolute 0 04 0 00 - 0 10 Thousands/µL Protime-INR    Collection Time: 03/29/18  9:24 AM   Result Value Ref Range    Protime 26 2 (H) 12 1 - 14 4 seconds    INR 2 37 (H) 0 86 - 1 16   UA (URINE) with reflex to Microscopic    Collection Time: 03/29/18  1:56 PM   Result Value Ref Range    Color, UA Yellow     Clarity, UA Clear     Specific Gravity, UA 1 008 1 003 - 1 030    pH, UA 7 0 4 5 - 8 0    Leukocytes, UA Negative Negative    Nitrite, UA Negative Negative    Protein, UA Negative Negative mg/dl    Glucose, UA Negative Negative mg/dl    Ketones, UA Negative Negative mg/dl    Urobilinogen, UA 0 2 0 2, 1 0 E U /dl E U /dl    Bilirubin, UA Negative Negative    Blood, UA Negative Negative   Type and screen    Collection Time: 03/29/18  1:56 PM   Result Value Ref Range    ABO Grouping O     Rh Factor Positive     Antibody Screen Negative     Specimen Expiration Date [id number]    MRSA culture    Collection Time: 03/29/18  1:56 PM   Result Value Ref Range    MRSA Culture Only       No Methicillin Resistant Staphlyococcus aureus (MRSA) isolated   Type and screen    Collection Time: 03/29/18  1:56 PM   Result Value Ref Range    ABO Grouping O     Rh Factor Positive     Antibody Screen Negative     Specimen Expiration Date [id number]    Fingerstick Glucose (POCT)    Collection Time: 04/03/18  8:10 AM   Result Value Ref Range    POC Glucose 316 (H) 65 - 140 mg/dl   POCT activated clotting time    Collection Time: 04/03/18 10:31 AM   Result Value Ref Range    Activated Clotting Time, i-STAT 143 (H) 89 - 137 sec    Specimen Type ARTERIAL    POCT Blood Gas (CG8+)    Collection Time: 04/03/18 10:32 AM   Result Value Ref Range    pH, Art i-STAT 7 360 7 350 - 7 450    pCO2, Art i-STAT 44 0 36 0 - 44 0 mm HG    pO2, ART i-STAT 155 0 (H) 75 0 - 129 0 mm HG    BE, i-STAT -1 -2 - 3 mmol/L    HCO3, Art i-STAT 24 8 22 0 - 28 0 mmol/L    CO2, i-STAT 26 21 - 32 mmol/L    O2 Sat, i-STAT 99 (H) 95 - 98 %    SODIUM, I-STAT 137 136 - 145 mmol/l    Potassium, i-STAT 4 6 3 5 - 5 3 mmol/L    Calcium, Ionized i-STAT 0 93 (L) 1 12 - 1 32 mmol/L    Hct, i-STAT 30 (L) 34 8 - 46 1 %    Hgb, i-STAT 10 2 (L) 11 5 - 15 4 g/dl    Glucose, i-STAT 347 (H) 65 - 140 mg/dl    Specimen Type ARTERIAL    POCT Blood Gas (CG8+)    Collection Time: 04/03/18 10:53 AM   Result Value Ref Range    pH, Art i-STAT 7 361 7 350 - 7 450    pCO2, Art i-STAT 44 3 (H) 36 0 - 44 0 mm HG    pO2, ART i-STAT 142 0 (H) 75 0 - 129 0 mm HG    BE, i-STAT 0 -2 - 3 mmol/L    HCO3, Art i-STAT 25 1 22 0 - 28 0 mmol/L    CO2, i-STAT 26 21 - 32 mmol/L    O2 Sat, i-STAT 99 (H) 95 - 98 %    SODIUM, I-STAT 139 136 - 145 mmol/l    Potassium, i-STAT 3 9 3 5 - 5 3 mmol/L    Calcium, Ionized i-STAT 0 92 (L) 1 12 - 1 32 mmol/L    Hct, i-STAT 28 (L) 34 8 - 46 1 %    Hgb, i-STAT 9 5 (L) 11 5 - 15 4 g/dl    Glucose, i-STAT 324 (H) 65 - 140 mg/dl    Specimen Type ARTERIAL    POCT activated clotting time    Collection Time: 04/03/18 10:53 AM   Result Value Ref Range    Activated Clotting Time, i-STAT 335 (H) 89 - 137 sec    Specimen Type ARTERIAL    POCT activated clotting time    Collection Time: 04/03/18 11:16 AM   Result Value Ref Range    Activated Clotting Time, i-STAT 121 89 - 137 sec    Specimen Type ARTERIAL    POCT Blood Gas (CG8+)    Collection Time: 04/03/18 11:17 AM   Result Value Ref Range    pH, Art i-STAT 7 340 (L) 7 350 - 7 450    pCO2, Art i-STAT 44 4 (H) 36 0 - 44 0 mm HG    pO2, ART i-STAT 147 0 (H) 75 0 - 129 0 mm HG    BE, i-STAT -2 -2 - 3 mmol/L    HCO3, Art i-STAT 23 9 22 0 - 28 0 mmol/L    CO2, i-STAT 25 21 - 32 mmol/L    O2 Sat, i-STAT 99 (H) 95 - 98 %    SODIUM, I-STAT 139 136 - 145 mmol/l    Potassium, i-STAT 4 0 3 5 - 5 3 mmol/L    Calcium, Ionized i-STAT 0 93 (L) 1 12 - 1 32 mmol/L    Hct, i-STAT 28 (L) 34 8 - 46 1 %    Hgb, i-STAT 9 5 (L) 11 5 - 15 4 g/dl    Glucose, i-STAT 308 (H) 65 - 140 mg/dl    Specimen Type ARTERIAL    Basic metabolic panel    Collection Time: 04/03/18 12:10 PM   Result Value Ref Range    Sodium 138 136 - 145 mmol/L    Potassium 3 9 3 5 - 5 3 mmol/L    Chloride 105 100 - 108 mmol/L    CO2 24 21 - 32 mmol/L    Anion Gap 9 4 - 13 mmol/L    BUN 26 (H) 5 - 25 mg/dL    Creatinine 1 08 0 60 - 1 30 mg/dL    Glucose 259 (H) 65 - 140 mg/dL    Calcium 6 7 (L) 8 3 - 10 1 mg/dL    eGFR 49 ml/min/1 73sq m   Hemoglobin and hematocrit, blood    Collection Time: 04/03/18 12:10 PM   Result Value Ref Range    Hemoglobin 8 9 (L) 11 5 - 15 4 g/dL    Hematocrit 29 8 (L) 34 8 - 46 1 %   Platelet count    Collection Time: 04/03/18 12:10 PM   Result Value Ref Range    Platelets 648 759 - 989 Thousands/uL    MPV 10 4 8 9 - 12 7 fL   POCT Blood Gas (CG8+)    Collection Time: 04/03/18 12:13 PM   Result Value Ref Range    pH, Art i-STAT 7 399 7 350 - 7 450    pCO2, Art i-STAT 38 0 36 0 - 44 0 mm HG    pO2, ART i-STAT 94 0 75 0 - 129 0 mm HG    BE, i-STAT -1 -2 - 3 mmol/L    HCO3, Art i-STAT 23 5 22 0 - 28 0 mmol/L    CO2, i-STAT 25 21 - 32 mmol/L    O2 Sat, i-STAT 97 95 - 98 %    SODIUM, I-STAT 139 136 - 145 mmol/l    Potassium, i-STAT 3 9 3 5 - 5 3 mmol/L    Calcium, Ionized i-STAT 0 91 (L) 1 12 - 1 32 mmol/L    Hct, i-STAT 27 (L) 34 8 - 46 1 %    Hgb, i-STAT 9 2 (L) 11 5 - 15 4 g/dl    Glucose, i-STAT 274 (H) 65 - 140 mg/dl    POC FIO2 45 L    Specimen Type ARTERIAL     Delivery System [facility]     Respiratory Rate 121     Vent Value 430     Ancillary Values PEEPS     Pressure Setting 05    ECG 12 lead upon arrival to unit    Collection Time: 04/03/18 12:16 PM   Result Value Ref Range    Ventricular Rate 79 BPM    Atrial Rate 79 BPM    OK Interval 296 ms    QRSD Interval 158 ms    QT Interval 463 ms    QTC Interval 531 ms    P Axis -26 degrees    QRS Axis -51 degrees    T Wave Axis 156 degrees   Fingerstick Glucose (POCT)    Collection Time: 04/03/18 12:46 PM   Result Value Ref Range    POC Glucose 236 (H) 65 - 140 mg/dl   Protime-INR    Collection Time: 04/03/18  1:28 PM   Result Value Ref Range    Protime 18 3 (H) 12 1 - 14 4 seconds    INR 1 51 (H) 0 86 - 1 16   Fingerstick Glucose (POCT)    Collection Time: 04/03/18  4:04 PM   Result Value Ref Range    POC Glucose 249 (H) 65 - 140 mg/dl   Potassium    Collection Time: 04/03/18  4:09 PM   Result Value Ref Range    Potassium 4 5 3 5 - 5 3 mmol/L   Hemoglobin and hematocrit, blood    Collection Time: 04/03/18  8:05 PM   Result Value Ref Range    Hemoglobin 8 5 (L) 11 5 - 15 4 g/dL    Hematocrit 27 5 (L) 34 8 - 46 1 %   Potassium    Collection Time: 04/03/18  8:05 PM   Result Value Ref Range    Potassium 4 1 3 5 - 5 3 mmol/L   Fingerstick Glucose (POCT)    Collection Time: 04/03/18  9:01 PM   Result Value Ref Range    POC Glucose 277 (H) 65 - 140 mg/dl   Basic metabolic panel    Collection Time: 04/04/18  1:08 AM   Result Value Ref Range    Sodium 139 136 - 145 mmol/L    Potassium 4 1 3 5 - 5 3 mmol/L    Chloride 106 100 - 108 mmol/L    CO2 25 21 - 32 mmol/L    Anion Gap 8 4 - 13 mmol/L    BUN 23 5 - 25 mg/dL    Creatinine 1 01 0 60 - 1 30 mg/dL    Glucose 278 (H) 65 - 140 mg/dL    Calcium 7 4 (L) 8 3 - 10 1 mg/dL    eGFR 53 ml/min/1 73sq m   Magnesium    Collection Time: 04/04/18  1:08 AM   Result Value Ref Range    Magnesium 1 4 (L) 1 6 - 2 6 mg/dL   EKG 12 lead    Collection Time: 04/04/18  1:33 AM   Result Value Ref Range    Ventricular Rate 104 BPM    Atrial Rate 104 BPM    UT Interval 296 ms    QRSD Interval 154 ms    QT Interval 396 ms    QTC Interval 521 ms    P [place] 46 degrees    QRS Axis -46 degrees    T Wave Axis 121 degrees   Fingerstick Glucose (POCT)    Collection Time: 04/04/18  1:43 AM   Result Value Ref Range    POC Glucose 289 (H) 65 - 140 mg/dl   ECG 12 lead    Collection Time: 04/04/18  4:07 AM   Result Value Ref Range    Ventricular Rate 59 BPM    Atrial Rate 59 BPM    UT Interval 354 ms    QRSD Interval 154 ms    QT Interval 500 ms    QTC Interval 496 ms    P Axis 81 degrees    QRS Axis -74 degrees    T Wave Axis 148 degrees   Basic Metabolic Panel -AM POD #1    Collection Time: 04/04/18  4:11 AM   Result Value Ref Range    Sodium 139 136 - 145 mmol/L    Potassium 4 0 3 5 - 5 3 mmol/L    Chloride 106 100 - 108 mmol/L    CO2 25 21 - 32 mmol/L    Anion Gap 8 4 - 13 mmol/L    BUN 22 5 - 25 mg/dL    Creatinine 0 86 0 60 - 1 30 mg/dL    Glucose 232 (H) 65 - 140 mg/dL    Calcium 7 5 (L) 8 3 - 10 1 mg/dL    eGFR 64 ml/min/1 73sq m   Magnesium -AM POD #1    Collection Time: 04/04/18  4:11 AM   Result Value Ref Range    Magnesium 2 2 1 6 - 2 6 mg/dL   CBC-AM POD #1    Collection Time: 04/04/18  4:11 AM   Result Value Ref Range    WBC 12 62 (H) 4 31 - 10 16 Thousand/uL    RBC 3 86 3 81 - 5 12 Million/uL    Hemoglobin 8 0 (L) 11 5 - 15 4 g/dL    Hematocrit 27 0 (L) 34 8 - 46 1 %    MCV 70 (L) 82 - 98 fL    MCH 20 7 (L) 26 8 - 34 3 pg    MCHC 29 6 (L) 31 4 - 37 4 g/dL    RDW 18 7 (H) 11 6 - 15 1 %    Platelets 169 194 - 322 Thousands/uL    MPV 10 3 8 9 - 12 7 fL   Protime - INR AM POD #1    Collection Time: 04/04/18  4:11 AM   Result Value Ref Range    Protime 17 3 (H) 12 1 - 14 4 seconds    INR 1 41 (H) 0 86 - 1 16   Fingerstick Glucose (POCT)    Collection Time: 04/04/18  6:04 AM   Result Value Ref Range    POC Glucose 196 (H) 65 - 140 mg/dl   Fingerstick Glucose (POCT)    Collection Time: 04/04/18 11:55 AM   Result Value Ref Range    POC Glucose 326 (H) 65 - 140 mg/dl   Fingerstick Glucose (POCT)    Collection Time: 04/04/18  3:57 PM   Result Value Ref Range    POC Glucose 235 (H) 65 - 140 mg/dl   Hemoglobin and hematocrit, blood    Collection Time: 04/04/18  6:01 PM   Result Value Ref Range    Hemoglobin 8 8 (L) 11 5 - 15 4 g/dL    Hematocrit 28 3 (L) 34 8 - 46 1 %   Fingerstick Glucose (POCT)    Collection Time: 04/04/18  8:57 PM   Result Value Ref Range    POC Glucose 215 (H) 65 - 140 mg/dl   Hemoglobin and hematocrit, blood    Collection Time: 04/05/18  4:08 AM   Result Value Ref Range    Hemoglobin 8 3 (L) 11 5 - 15 4 g/dL    Hematocrit 27 2 (L) 34 8 - 46 1 %   Basic metabolic panel Collection Time: 04/05/18  4:08 AM   Result Value Ref Range    Sodium 138 136 - 145 mmol/L    Potassium 3 5 3 5 - 5 3 mmol/L    Chloride 104 100 - 108 mmol/L    CO2 30 21 - 32 mmol/L    Anion Gap 4 4 - 13 mmol/L    BUN 20 5 - 25 mg/dL    Creatinine 0 76 0 60 - 1 30 mg/dL    Glucose 134 65 - 140 mg/dL    Calcium 7 5 (L) 8 3 - 10 1 mg/dL    eGFR 74 ml/min/1 73sq m   Magnesium    Collection Time: 04/05/18  4:08 AM   Result Value Ref Range    Magnesium 1 6 1 6 - 2 6 mg/dL   Protime-INR    Collection Time: 04/05/18  4:08 AM   Result Value Ref Range    Protime 17 8 (H) 12 1 - 14 4 seconds    INR 1 46 (H) 0 86 - 1 16   Fingerstick Glucose (POCT)    Collection Time: 04/05/18  6:14 AM   Result Value Ref Range    POC Glucose 115 65 - 140 mg/dl   Prepare RBC:Has consent been obtained? Yes; Date of Surgery: 4/3/2018; Where is the Surgery Scheduled?  [place], 3 Units    Collection Time: 04/05/18  6:35 AM   Result Value Ref Range    Unit Product Code A9744E99     Unit Number [id number]     Unit ABO O     Unit [facility] POS     Unit Dispense Status Return to [facility]     Unit Product Code Q7428J90     Unit Number [id number]     Unit ABO O     Unit [facility] POS     Unit Dispense Status Return to [facility]     Unit Product Code Y2926U17     Unit Number [id number]     Unit ABO O     Unit [facility] POS     Unit Dispense Status Return to [facility]     Unit Product Code Q5831E95     Unit Number [id number]     Unit ABO O     Unit [facility] POS     Unit Dispense Status Return to [facility]    Fingerstick Glucose (POCT)    Collection Time: 04/05/18  7:18 AM   Result Value Ref Range    POC Glucose 122 65 - 140 mg/dl   Fingerstick Glucose (POCT)    Collection Time: 04/05/18 11:09 AM   Result Value Ref Range    POC Glucose 227 (H) 65 - 140 mg/dl   Fingerstick Glucose (POCT)    Collection Time: 04/05/18  4:09 PM   Result Value Ref Range    POC Glucose 176 (H) 65 - 140 mg/dl   Fingerstick Glucose (POCT)    Collection Time: 04/05/18  9:26 PM   Result Value Ref Range POC Glucose 153 (H) 65 - 140 mg/dl   Potassium    Collection Time: 04/05/18 10:48 PM   Result Value Ref Range    Potassium 4 1 3 5 - 5 3 mmol/L   Magnesium    Collection Time: 04/05/18 10:48 PM   Result Value Ref Range    Magnesium 1 8 1 6 - 2 6 mg/dL   Protime-INR    Collection Time: 04/06/18  5:00 AM   Result Value Ref Range    Protime 16 9 (H) 12 1 - 14 4 seconds    INR 1 37 (H) 0 86 - 7 75   Basic metabolic panel    Collection Time: 04/06/18  5:00 AM   Result Value Ref Range    Sodium 139 136 - 145 mmol/L    Potassium 3 5 3 5 - 5 3 mmol/L    Chloride 105 100 - 108 mmol/L    CO2 29 21 - 32 mmol/L    Anion Gap 5 4 - 13 mmol/L    BUN 19 5 - 25 mg/dL    Creatinine 0 81 0 60 - 1 30 mg/dL    Glucose 96 65 - 140 mg/dL    Calcium 8 2 (L) 8 3 - 10 1 mg/dL    eGFR 69 ml/min/1 73sq m   CBC (With Platelets)    Collection Time: 04/06/18  5:00 AM   Result Value Ref Range    WBC 7 79 4 31 - 10 16 Thousand/uL    RBC 4 39 3 81 - 5 12 Million/uL    Hemoglobin 9 3 (L) 11 5 - 15 4 g/dL    Hematocrit 30 1 (L) 34 8 - 46 1 %    MCV 69 (L) 82 - 98 fL    MCH 21 2 (L) 26 8 - 34 3 pg    MCHC 30 9 (L) 31 4 - 37 4 g/dL    RDW 18 9 (H) 11 6 - 15 1 %    Platelets 132 107 - 090 Thousands/uL    MPV 10 4 8 9 - 12 7 fL   Fingerstick Glucose (POCT)    Collection Time: 04/06/18  6:40 AM   Result Value Ref Range    POC Glucose 121 65 - 140 mg/dl   Fingerstick Glucose (POCT)    Collection Time: 04/06/18 10:50 AM   Result Value Ref Range    POC Glucose 196 (H) 65 - 140 mg/dl   Fingerstick Glucose (POCT)    Collection Time: 04/06/18  4:30 PM   Result Value Ref Range    POC Glucose 256 (H) 65 - 140 mg/dl   CBC and differential    Collection Time: 04/16/18  1:26 PM   Result Value Ref Range    WBC 6 10 4 31 - 10 16 Thousand/uL    RBC 4 98 3 81 - 5 12 Million/uL    Hemoglobin 10 6 (L) 11 5 - 15 4 g/dL    Hematocrit 33 7 (L) 34 8 - 46 1 %    MCV 68 (L) 82 - 98 fL    MCH 21 3 (L) 26 8 - 34 3 pg    MCHC 31 5 31 4 - 37 4 g/dL    RDW 18 9 (H) 11 6 - 15 1 % Platelets 191 968 - 943 Thousands/uL    nRBC 0 /100 WBCs    Neutrophils Relative 61 43 - 75 %    Lymphocytes Relative 24 14 - 44 %    Monocytes Relative 12 4 - 12 %    Eosinophils Relative 2 0 - 6 %    Basophils Relative 1 0 - 1 %    Neutrophils Absolute 3 70 1 85 - 7 62 Thousands/µL    Lymphocytes Absolute 1 49 0 60 - 4 47 Thousands/µL    Monocytes Absolute 0 75 0 17 - 1 22 Thousand/µL    Eosinophils Absolute 0 12 0 00 - 0 61 Thousand/µL    Basophils Absolute 0 03 0 00 - 0 10 Thousands/µL   Protime-INR    Collection Time: 04/16/18  1:26 PM   Result Value Ref Range    Protime 32 7 (H) 12 1 - 14 4 seconds    INR 3 14 (H) 0 86 - 1 16   APTT    Collection Time: 04/16/18  1:26 PM   Result Value Ref Range    PTT 47 (H) 23 - 35 seconds   Comprehensive metabolic panel    Collection Time: 04/16/18  1:26 PM   Result Value Ref Range    Sodium 138 136 - 145 mmol/L    Potassium 4 5 3 5 - 5 3 mmol/L    Chloride 103 100 - 108 mmol/L    CO2 24 21 - 32 mmol/L    Anion Gap 11 4 - 13 mmol/L    BUN 47 (H) 5 - 25 mg/dL    Creatinine 1 48 (H) 0 60 - 1 30 mg/dL    Glucose 105 65 - 140 mg/dL    Calcium 7 1 mg/dL    AST 48 (H) 5 - 45 U/L    ALT 28 12 - 78 U/L    Alkaline Phosphatase 73 46 - 116 U/L    Total Protein 7 4 6 4 - 8 2 g/dL    Albumin 3 4 (L) 3 5 - 5 0 g/dL    Total Bilirubin 0 36 0 20 - 1 00 mg/dL    eGFR 33 ml/min/1 73sq m   Troponin I    Collection Time: 04/16/18  1:26 PM   Result Value Ref Range    Troponin I 0 06 (H) <=0 04 ng/mL   ECG 12 lead    Collection Time: 04/16/18  1:36 PM   Result Value Ref Range    Ventricular Rate 102 BPM    Atrial Rate 102 BPM    MS Interval  ms    QRSD Interval 146 ms    QT Interval 386 ms    QTC Interval 503 ms    P [place] 79 degrees    QRS Axis -57 degrees    T Wave Axis 97 degrees   Fingerstick Glucose (POCT)    Collection Time: 04/16/18  8:59 PM   Result Value Ref Range    POC Glucose 125 65 - 140 mg/dl   Comprehensive metabolic panel    Collection Time: 04/17/18  4:47 AM   Result Value Ref Range    Sodium 139 136 - 145 mmol/L    Potassium 3 8 3 5 - 5 3 mmol/L    Chloride 107 100 - 108 mmol/L    CO2 23 21 - 32 mmol/L    Anion Gap 9 4 - 13 mmol/L    BUN 36 (H) 5 - 25 mg/dL    Creatinine 1 07 0 60 - 1 30 mg/dL    Glucose 80 65 - 140 mg/dL    Calcium 6 6 (L) 8 3 - 10 1 mg/dL    AST 38 5 - 45 U/L    ALT 21 12 - 78 U/L    Alkaline Phosphatase 60 46 - 116 U/L    Total Protein 6 4 6 4 - 8 2 g/dL    Albumin 2 9 (L) 3 5 - 5 0 g/dL    Total Bilirubin 0 50 0 20 - 1 00 mg/dL    eGFR 49 ml/min/1 73sq m   CBC (With Platelets)    Collection Time: 04/17/18  4:47 AM   Result Value Ref Range    WBC 5 26 4 31 - 10 16 Thousand/uL    RBC 4 44 3 81 - 5 12 Million/uL    Hemoglobin 9 3 (L) 11 5 - 15 4 g/dL    Hematocrit 29 9 (L) 34 8 - 46 1 %    MCV 67 (L) 82 - 98 fL    MCH 20 9 (L) 26 8 - 34 3 pg    MCHC 31 1 (L) 31 4 - 37 4 g/dL    RDW 19 0 (H) 11 6 - 15 1 %    Platelets 291 (L) 185 - 390 Thousands/uL   Fingerstick Glucose (POCT)    Collection Time: 04/17/18  6:30 AM   Result Value Ref Range    POC Glucose 78 65 - 140 mg/dl   Fingerstick Glucose (POCT)    Collection Time: 04/17/18 10:44 AM   Result Value Ref Range    POC Glucose 167 (H) 65 - 140 mg/dl   Fingerstick Glucose (POCT)    Collection Time: 04/17/18  4:06 PM   Result Value Ref Range    POC Glucose 138 65 - 140 mg/dl   Fingerstick Glucose (POCT)    Collection Time: 04/17/18  9:34 PM   Result Value Ref Range    POC Glucose 159 (H) 65 - 140 mg/dl   Protime-INR    Collection Time: 04/18/18  4:52 AM   Result Value Ref Range    Protime 30 3 (H) 12 1 - 14 4 seconds    INR 2 85 (H) 0 86 - 9 59   Basic metabolic panel    Collection Time: 04/18/18  4:52 AM   Result Value Ref Range    Sodium 142 136 - 145 mmol/L    Potassium 4 4 3 5 - 5 3 mmol/L    Chloride 113 (H) 100 - 108 mmol/L    CO2 19 (L) 21 - 32 mmol/L    Anion Gap 10 4 - 13 mmol/L    BUN 23 5 - 25 mg/dL    Creatinine 1 01 0 60 - 1 30 mg/dL    Glucose 60 (L) 65 - 140 mg/dL    Calcium 6 4 (L) 8 3 - 10 1 mg/dL    eGFR 48 ml/min/1 73sq m   HEMOGLOBIN A1C W/ EAG ESTIMATION    Collection Time: 04/18/18  4:52 AM   Result Value Ref Range    Hemoglobin A1C 7 7 (H) 4 2 - 6 3 %     mg/dl   Lipid Panel with Direct LDL reflex    Collection Time: 04/18/18  4:52 AM   Result Value Ref Range    Cholesterol 101 50 - 200 mg/dL    Triglycerides 99 <=150 mg/dL    HDL, Direct 35 (L) 40 - 60 mg/dL    LDL Calculated 46 0 - 100 mg/dL   Fingerstick Glucose (POCT)    Collection Time: 04/18/18  6:36 AM   Result Value Ref Range    POC Glucose 55 (L) 65 - 140 mg/dl   Fingerstick Glucose (POCT)    Collection Time: 04/18/18  7:45 AM   Result Value Ref Range    POC Glucose 104 65 - 140 mg/dl   CBC    Collection Time: 04/18/18 10:43 AM   Result Value Ref Range    WBC 5 92 4 31 - 10 16 Thousand/uL    RBC 4 91 3 81 - 5 12 Million/uL    Hemoglobin 10 4 (L) 11 5 - 15 4 g/dL    Hematocrit 33 8 (L) 34 8 - 46 1 %    MCV 69 (L) 82 - 98 fL    MCH 21 2 (L) 26 8 - 34 3 pg    MCHC 30 8 (L) 31 4 - 37 4 g/dL    RDW 19 2 (H) 11 6 - 15 1 %    Platelets 858 854 - 923 Thousands/uL   Fingerstick Glucose (POCT)    Collection Time: 04/18/18 10:59 AM   Result Value Ref Range    POC Glucose 70 65 - 140 mg/dl   Fingerstick Glucose (POCT)    Collection Time: 04/18/18 11:52 AM   Result Value Ref Range    POC Glucose 78 65 - 140 mg/dl   Fingerstick Glucose (POCT)    Collection Time: 04/18/18  4:26 PM   Result Value Ref Range    POC Glucose 183 (H) 65 - 140 mg/dl   Fingerstick Glucose (POCT)    Collection Time: 04/18/18  8:46 PM   Result Value Ref Range    POC Glucose 256 (H) 65 - 140 mg/dl   Basic metabolic panel    Collection Time: 04/19/18  5:03 AM   Result Value Ref Range    Sodium 144 136 - 145 mmol/L    Potassium 3 9 3 5 - 5 3 mmol/L    Chloride 113 (H) 100 - 108 mmol/L    CO2 23 21 - 32 mmol/L    Anion Gap 8 4 - 13 mmol/L    BUN 17 5 - 25 mg/dL    Creatinine 0 87 0 60 - 1 30 mg/dL    Glucose 76 65 - 140 mg/dL    Calcium 6 4 (L) 8 3 - 10 1 mg/dL    eGFR 63 ml/min/1 73sq m CBC    Collection Time: 04/19/18  5:03 AM   Result Value Ref Range    WBC 5 30 4 31 - 10 16 Thousand/uL    RBC 4 35 3 81 - 5 12 Million/uL    Hemoglobin 9 3 (L) 11 5 - 15 4 g/dL    Hematocrit 30 7 (L) 34 8 - 46 1 %    MCV 71 (L) 82 - 98 fL    MCH 21 4 (L) 26 8 - 34 3 pg    MCHC 30 3 (L) 31 4 - 37 4 g/dL    RDW 19 1 (H) 11 6 - 15 1 %    Platelets 689 743 - 195 Thousands/uL   Fingerstick Glucose (POCT)    Collection Time: 04/19/18  6:29 AM   Result Value Ref Range    POC Glucose 77 65 - 140 mg/dl   Fingerstick Glucose (POCT)    Collection Time: 04/19/18 11:10 AM   Result Value Ref Range    POC Glucose 162 (H) 65 - 140 mg/dl            Physical Exam   Constitutional: She is oriented to person, place, and time  She appears well-developed and well-nourished  No distress  HENT:   Head: Normocephalic and atraumatic  Eyes: Conjunctivae and EOM are normal  Pupils are equal, round, and reactive to light  Right eye exhibits no discharge  Left eye exhibits no discharge  Neck: Normal range of motion  Neck supple  No JVD present  No thyromegaly present  Cardiovascular: Normal rate, regular rhythm, normal heart sounds and intact distal pulses  Exam reveals no gallop and no friction rub  No murmur heard  Pulmonary/Chest: Effort normal and breath sounds normal  No respiratory distress  She has no wheezes  She has no rales  She exhibits no tenderness  Abdominal: Soft  She exhibits no distension  There is no tenderness  Musculoskeletal: Normal range of motion  She exhibits no edema, tenderness or deformity  Lymphadenopathy:     She has no cervical adenopathy  Neurological: She is alert and oriented to person, place, and time  She has normal reflexes  She displays no atrophy and no tremor  No cranial nerve deficit or sensory deficit  She exhibits normal muscle tone  She displays no seizure activity  Coordination normal  GCS eye subscore is 4  GCS verbal subscore is 5  GCS motor subscore is 6     Bilateral lower extremity and upper extremity strength of 4/5  Skin: Skin is warm and dry  No rash noted  She is not diaphoretic  No erythema  No pallor  Psychiatric: She has a normal mood and affect  Her behavior is normal  Judgment and thought content normal    Nursing note and vitals reviewed

## 2018-04-26 DIAGNOSIS — E78.2 MIXED HYPERLIPIDEMIA: Primary | ICD-10-CM

## 2018-04-26 RX ORDER — ATORVASTATIN CALCIUM 80 MG/1
80 TABLET, FILM COATED ORAL DAILY
Qty: 30 TABLET | Refills: 2 | Status: SHIPPED | OUTPATIENT
Start: 2018-04-26

## 2018-04-27 ENCOUNTER — TELEPHONE (OUTPATIENT)
Dept: INTERNAL MEDICINE CLINIC | Facility: CLINIC | Age: 78
End: 2018-04-27

## 2018-04-27 NOTE — TELEPHONE ENCOUNTER
Pt should be seen  If unable to be seen today, increasing SOB, CP, fatigue, unstable VS should go to ED    THANKS!!

## 2018-04-27 NOTE — TELEPHONE ENCOUNTER
alysa from [facility] Kushal Campbell 803 visiting nurses called to make aware that the patient has not been feeling good, nausea and h/a some slow weight gain from 163 to 166 over a 6 day period  Also some sob with activity  They are asking that if she needs to come in call her son in law, if you need radha to do anything call alysa at [phone]

## 2018-04-28 ENCOUNTER — HOSPITAL ENCOUNTER (EMERGENCY)
Facility: HOSPITAL | Age: 78
Discharge: HOME | DRG: 056 | End: 2018-04-28
Attending: EMERGENCY MEDICINE | Admitting: EMERGENCY MEDICINE
Payer: MEDICARE

## 2018-04-28 ENCOUNTER — APPOINTMENT (EMERGENCY)
Dept: RADIOLOGY | Facility: HOSPITAL | Age: 78
DRG: 056 | End: 2018-04-28
Payer: MEDICARE

## 2018-04-28 ENCOUNTER — HOSPITAL ENCOUNTER (INPATIENT)
Facility: HOSPITAL | Age: 78
LOS: 8 days | Discharge: NURSING, REHAB OR LONG-TERM CARE | DRG: 056 | End: 2018-05-07
Attending: EMERGENCY MEDICINE | Admitting: INTERNAL MEDICINE
Payer: MEDICARE

## 2018-04-28 VITALS
RESPIRATION RATE: 18 BRPM | OXYGEN SATURATION: 96 % | HEART RATE: 86 BPM | DIASTOLIC BLOOD PRESSURE: 88 MMHG | BODY MASS INDEX: 28.32 KG/M2 | TEMPERATURE: 98.2 F | WEIGHT: 165 LBS | SYSTOLIC BLOOD PRESSURE: 132 MMHG

## 2018-04-28 DIAGNOSIS — E44.1 MILD PROTEIN-CALORIE MALNUTRITION (HCC): ICD-10-CM

## 2018-04-28 DIAGNOSIS — I44.0 1ST DEGREE AV BLOCK: ICD-10-CM

## 2018-04-28 DIAGNOSIS — I47.2 V-TACH (HCC): ICD-10-CM

## 2018-04-28 DIAGNOSIS — R56.9 SEIZURE (HCC): ICD-10-CM

## 2018-04-28 DIAGNOSIS — R11.0 NAUSEA: ICD-10-CM

## 2018-04-28 DIAGNOSIS — R11.10 INTRACTABLE VOMITING: ICD-10-CM

## 2018-04-28 DIAGNOSIS — I73.9 PERIPHERAL ARTERY DISEASE (HCC): ICD-10-CM

## 2018-04-28 DIAGNOSIS — D64.9 ANEMIA: ICD-10-CM

## 2018-04-28 DIAGNOSIS — G93.40 ENCEPHALOPATHY ACUTE: ICD-10-CM

## 2018-04-28 DIAGNOSIS — R79.1 SUPRATHERAPEUTIC INR: ICD-10-CM

## 2018-04-28 DIAGNOSIS — R10.84 GENERALIZED ABDOMINAL PAIN: ICD-10-CM

## 2018-04-28 DIAGNOSIS — R41.82 ALTERED MENTAL STATUS: Primary | ICD-10-CM

## 2018-04-28 DIAGNOSIS — Z86.73 HISTORY OF CVA (CEREBROVASCULAR ACCIDENT): ICD-10-CM

## 2018-04-28 DIAGNOSIS — I47.2 NSVT (NONSUSTAINED VENTRICULAR TACHYCARDIA) (HCC): ICD-10-CM

## 2018-04-28 DIAGNOSIS — R53.1 WEAKNESS: ICD-10-CM

## 2018-04-28 DIAGNOSIS — R56.9 SEIZURE-LIKE ACTIVITY (HCC): ICD-10-CM

## 2018-04-28 DIAGNOSIS — E87.6 HYPOKALEMIA: Primary | ICD-10-CM

## 2018-04-28 PROBLEM — J95.89 ACUTE RESPIRATORY INSUFFICIENCY, POSTOPERATIVE: Status: RESOLVED | Noted: 2018-04-03 | Resolved: 2018-04-28

## 2018-04-28 PROBLEM — N17.9 AKI (ACUTE KIDNEY INJURY) (HCC): Status: RESOLVED | Noted: 2017-12-19 | Resolved: 2018-04-28

## 2018-04-28 PROBLEM — I63.9 CVA (CEREBRAL VASCULAR ACCIDENT) (HCC): Status: RESOLVED | Noted: 2018-04-17 | Resolved: 2018-04-28

## 2018-04-28 LAB
ALBUMIN SERPL BCP-MCNC: 2.9 G/DL (ref 3.5–5)
ALBUMIN SERPL BCP-MCNC: 3 G/DL (ref 3.5–5)
ALP SERPL-CCNC: 72 U/L (ref 46–116)
ALP SERPL-CCNC: 74 U/L (ref 46–116)
ALT SERPL W P-5'-P-CCNC: 19 U/L (ref 12–78)
ALT SERPL W P-5'-P-CCNC: 19 U/L (ref 12–78)
AMMONIA PLAS-SCNC: 26 UMOL/L (ref 11–35)
ANION GAP BLD CALC-SCNC: 21 MMOL/L (ref 4–13)
ANION GAP SERPL CALCULATED.3IONS-SCNC: 12 MMOL/L (ref 4–13)
ANION GAP SERPL CALCULATED.3IONS-SCNC: 13 MMOL/L (ref 4–13)
APTT PPP: 50 SECONDS (ref 23–35)
APTT PPP: 51 SECONDS (ref 23–35)
AST SERPL W P-5'-P-CCNC: 28 U/L (ref 5–45)
AST SERPL W P-5'-P-CCNC: 33 U/L (ref 5–45)
ATRIAL RATE: 102 BPM
BACTERIA UR QL AUTO: ABNORMAL /HPF
BASOPHILS # BLD AUTO: 0.03 THOUSANDS/ΜL (ref 0–0.1)
BASOPHILS # BLD AUTO: 0.03 THOUSANDS/ΜL (ref 0–0.1)
BASOPHILS NFR BLD AUTO: 0 % (ref 0–1)
BASOPHILS NFR BLD AUTO: 0 % (ref 0–1)
BILIRUB SERPL-MCNC: 0.67 MG/DL (ref 0.2–1)
BILIRUB SERPL-MCNC: 0.83 MG/DL (ref 0.2–1)
BILIRUB UR QL STRIP: NEGATIVE
BUN BLD-MCNC: 9 MG/DL (ref 5–25)
BUN SERPL-MCNC: 11 MG/DL (ref 5–25)
BUN SERPL-MCNC: 9 MG/DL (ref 5–25)
CA-I BLD-SCNC: 0.69 MMOL/L (ref 1.12–1.32)
CALCIUM SERPL-MCNC: 6 MG/DL (ref 8.3–10.1)
CALCIUM SERPL-MCNC: 6.2 MG/DL (ref 8.3–10.1)
CHLORIDE BLD-SCNC: 105 MMOL/L (ref 100–108)
CHLORIDE SERPL-SCNC: 107 MMOL/L (ref 100–108)
CHLORIDE SERPL-SCNC: 107 MMOL/L (ref 100–108)
CLARITY UR: CLEAR
CO2 SERPL-SCNC: 21 MMOL/L (ref 21–32)
CO2 SERPL-SCNC: 21 MMOL/L (ref 21–32)
COLOR UR: YELLOW
COLOR, POC: YELLOW
CREAT BLD-MCNC: 0.7 MG/DL (ref 0.6–1.3)
CREAT SERPL-MCNC: 0.7 MG/DL (ref 0.6–1.3)
CREAT SERPL-MCNC: 0.72 MG/DL (ref 0.6–1.3)
EOSINOPHIL # BLD AUTO: 0.06 THOUSAND/ΜL (ref 0–0.61)
EOSINOPHIL # BLD AUTO: 0.14 THOUSAND/ΜL (ref 0–0.61)
EOSINOPHIL NFR BLD AUTO: 1 % (ref 0–6)
EOSINOPHIL NFR BLD AUTO: 2 % (ref 0–6)
ERYTHROCYTE [DISTWIDTH] IN BLOOD BY AUTOMATED COUNT: 18.7 % (ref 11.6–15.1)
ERYTHROCYTE [DISTWIDTH] IN BLOOD BY AUTOMATED COUNT: 18.8 % (ref 11.6–15.1)
GFR SERPL CREATININE-BSD FRML MDRD: 79 ML/MIN/1.73SQ M
GFR SERPL CREATININE-BSD FRML MDRD: 82 ML/MIN/1.73SQ M
GFR SERPL CREATININE-BSD FRML MDRD: 82 ML/MIN/1.73SQ M
GLUCOSE SERPL-MCNC: 107 MG/DL (ref 65–140)
GLUCOSE SERPL-MCNC: 122 MG/DL (ref 65–140)
GLUCOSE SERPL-MCNC: 129 MG/DL (ref 65–140)
GLUCOSE UR STRIP-MCNC: NEGATIVE MG/DL
HCT VFR BLD AUTO: 30.1 % (ref 34.8–46.1)
HCT VFR BLD AUTO: 30.7 % (ref 34.8–46.1)
HCT VFR BLD CALC: 29 % (ref 34.8–46.1)
HGB BLD-MCNC: 9.2 G/DL (ref 11.5–15.4)
HGB BLD-MCNC: 9.5 G/DL (ref 11.5–15.4)
HGB BLDA-MCNC: 9.9 G/DL (ref 11.5–15.4)
HGB UR QL STRIP.AUTO: ABNORMAL
HYALINE CASTS #/AREA URNS LPF: ABNORMAL /LPF
INR PPP: 4.04 (ref 0.86–1.16)
INR PPP: 4.21 (ref 0.86–1.16)
KETONES UR STRIP-MCNC: NEGATIVE MG/DL
LEUKOCYTE ESTERASE UR QL STRIP: NEGATIVE
LIPASE SERPL-CCNC: 112 U/L (ref 73–393)
LIPASE SERPL-CCNC: 139 U/L (ref 73–393)
LYMPHOCYTES # BLD AUTO: 1.79 THOUSANDS/ΜL (ref 0.6–4.47)
LYMPHOCYTES # BLD AUTO: 1.93 THOUSANDS/ΜL (ref 0.6–4.47)
LYMPHOCYTES NFR BLD AUTO: 19 % (ref 14–44)
LYMPHOCYTES NFR BLD AUTO: 21 % (ref 14–44)
MCH RBC QN AUTO: 20.8 PG (ref 26.8–34.3)
MCH RBC QN AUTO: 20.9 PG (ref 26.8–34.3)
MCHC RBC AUTO-ENTMCNC: 30.6 G/DL (ref 31.4–37.4)
MCHC RBC AUTO-ENTMCNC: 30.9 G/DL (ref 31.4–37.4)
MCV RBC AUTO: 67 FL (ref 82–98)
MCV RBC AUTO: 68 FL (ref 82–98)
MONOCYTES # BLD AUTO: 0.59 THOUSAND/ΜL (ref 0.17–1.22)
MONOCYTES # BLD AUTO: 0.6 THOUSAND/ΜL (ref 0.17–1.22)
MONOCYTES NFR BLD AUTO: 6 % (ref 4–12)
MONOCYTES NFR BLD AUTO: 6 % (ref 4–12)
NEUTROPHILS # BLD AUTO: 6.59 THOUSANDS/ΜL (ref 1.85–7.62)
NEUTROPHILS # BLD AUTO: 7.09 THOUSANDS/ΜL (ref 1.85–7.62)
NEUTS SEG NFR BLD AUTO: 71 % (ref 43–75)
NEUTS SEG NFR BLD AUTO: 74 % (ref 43–75)
NITRITE UR QL STRIP: NEGATIVE
NON-SQ EPI CELLS URNS QL MICRO: ABNORMAL /HPF
NRBC BLD AUTO-RTO: 0 /100 WBCS
NRBC BLD AUTO-RTO: 0 /100 WBCS
P AXIS: 91 DEGREES
PCO2 BLD: 20 MMOL/L (ref 21–32)
PH UR STRIP.AUTO: 5.5 [PH] (ref 4.5–8)
PLATELET # BLD AUTO: 309 THOUSANDS/UL (ref 149–390)
PLATELET # BLD AUTO: 315 THOUSANDS/UL (ref 149–390)
PMV BLD AUTO: 10.1 FL (ref 8.9–12.7)
PMV BLD AUTO: 10.7 FL (ref 8.9–12.7)
POTASSIUM BLD-SCNC: 3.7 MMOL/L (ref 3.5–5.3)
POTASSIUM SERPL-SCNC: 3.1 MMOL/L (ref 3.5–5.3)
POTASSIUM SERPL-SCNC: 3.6 MMOL/L (ref 3.5–5.3)
PROT SERPL-MCNC: 7.4 G/DL (ref 6.4–8.2)
PROT SERPL-MCNC: 7.6 G/DL (ref 6.4–8.2)
PROT UR STRIP-MCNC: ABNORMAL MG/DL
PROTHROMBIN TIME: 40 SECONDS (ref 12.1–14.4)
PROTHROMBIN TIME: 41.3 SECONDS (ref 12.1–14.4)
QRS AXIS: -65 DEGREES
QRSD INTERVAL: 150 MS
QT INTERVAL: 400 MS
QTC INTERVAL: 518 MS
RBC # BLD AUTO: 4.4 MILLION/UL (ref 3.81–5.12)
RBC # BLD AUTO: 4.56 MILLION/UL (ref 3.81–5.12)
RBC #/AREA URNS AUTO: ABNORMAL /HPF
SODIUM BLD-SCNC: 142 MMOL/L (ref 136–145)
SODIUM SERPL-SCNC: 140 MMOL/L (ref 136–145)
SODIUM SERPL-SCNC: 141 MMOL/L (ref 136–145)
SP GR UR STRIP.AUTO: 1.02 (ref 1–1.03)
SPECIMEN SOURCE: ABNORMAL
T WAVE AXIS: 99 DEGREES
TROPONIN I SERPL-MCNC: 0.03 NG/ML
TSH SERPL DL<=0.05 MIU/L-ACNC: 1.65 UIU/ML (ref 0.36–3.74)
UROBILINOGEN UR QL STRIP.AUTO: 0.2 E.U./DL
VENTRICULAR RATE: 101 BPM
WBC # BLD AUTO: 9.3 THOUSAND/UL (ref 4.31–10.16)
WBC # BLD AUTO: 9.6 THOUSAND/UL (ref 4.31–10.16)
WBC #/AREA URNS AUTO: ABNORMAL /HPF

## 2018-04-28 PROCEDURE — 83690 ASSAY OF LIPASE: CPT | Performed by: EMERGENCY MEDICINE

## 2018-04-28 PROCEDURE — 80053 COMPREHEN METABOLIC PANEL: CPT | Performed by: EMERGENCY MEDICINE

## 2018-04-28 PROCEDURE — 99285 EMERGENCY DEPT VISIT HI MDM: CPT

## 2018-04-28 PROCEDURE — 85025 COMPLETE CBC W/AUTO DIFF WBC: CPT | Performed by: EMERGENCY MEDICINE

## 2018-04-28 PROCEDURE — 85730 THROMBOPLASTIN TIME PARTIAL: CPT | Performed by: EMERGENCY MEDICINE

## 2018-04-28 PROCEDURE — 96361 HYDRATE IV INFUSION ADD-ON: CPT

## 2018-04-28 PROCEDURE — 84484 ASSAY OF TROPONIN QUANT: CPT | Performed by: EMERGENCY MEDICINE

## 2018-04-28 PROCEDURE — 85610 PROTHROMBIN TIME: CPT | Performed by: EMERGENCY MEDICINE

## 2018-04-28 PROCEDURE — 96374 THER/PROPH/DIAG INJ IV PUSH: CPT

## 2018-04-28 PROCEDURE — 36415 COLL VENOUS BLD VENIPUNCTURE: CPT | Performed by: EMERGENCY MEDICINE

## 2018-04-28 PROCEDURE — 80047 BASIC METABLC PNL IONIZED CA: CPT

## 2018-04-28 PROCEDURE — 81001 URINALYSIS AUTO W/SCOPE: CPT

## 2018-04-28 PROCEDURE — 82140 ASSAY OF AMMONIA: CPT

## 2018-04-28 PROCEDURE — 93005 ELECTROCARDIOGRAM TRACING: CPT

## 2018-04-28 PROCEDURE — 70450 CT HEAD/BRAIN W/O DYE: CPT

## 2018-04-28 PROCEDURE — 85014 HEMATOCRIT: CPT

## 2018-04-28 PROCEDURE — 81002 URINALYSIS NONAUTO W/O SCOPE: CPT | Performed by: EMERGENCY MEDICINE

## 2018-04-28 PROCEDURE — 84443 ASSAY THYROID STIM HORMONE: CPT | Performed by: EMERGENCY MEDICINE

## 2018-04-28 PROCEDURE — 71046 X-RAY EXAM CHEST 2 VIEWS: CPT

## 2018-04-28 PROCEDURE — 74176 CT ABD & PELVIS W/O CONTRAST: CPT

## 2018-04-28 PROCEDURE — 93010 ELECTROCARDIOGRAM REPORT: CPT | Performed by: INTERNAL MEDICINE

## 2018-04-28 RX ORDER — INSULIN GLARGINE 100 [IU]/ML
30 INJECTION, SOLUTION SUBCUTANEOUS EVERY 12 HOURS SCHEDULED
Status: DISCONTINUED | OUTPATIENT
Start: 2018-04-29 | End: 2018-04-29

## 2018-04-28 RX ORDER — METOCLOPRAMIDE HYDROCHLORIDE 5 MG/ML
10 INJECTION INTRAMUSCULAR; INTRAVENOUS ONCE
Status: COMPLETED | OUTPATIENT
Start: 2018-04-28 | End: 2018-04-28

## 2018-04-28 RX ORDER — HYDRALAZINE HYDROCHLORIDE 20 MG/ML
5 INJECTION INTRAMUSCULAR; INTRAVENOUS EVERY 6 HOURS PRN
Status: DISCONTINUED | OUTPATIENT
Start: 2018-04-28 | End: 2018-04-29

## 2018-04-28 RX ORDER — METOPROLOL TARTRATE 100 MG/1
100 TABLET ORAL EVERY 12 HOURS SCHEDULED
Status: DISCONTINUED | OUTPATIENT
Start: 2018-04-28 | End: 2018-04-29

## 2018-04-28 RX ORDER — SODIUM CHLORIDE 450 MG/100ML
50 INJECTION, SOLUTION INTRAVENOUS CONTINUOUS
Status: DISCONTINUED | OUTPATIENT
Start: 2018-04-29 | End: 2018-04-29

## 2018-04-28 RX ORDER — LEVOTHYROXINE SODIUM 0.1 MG/1
100 TABLET ORAL DAILY
Status: DISCONTINUED | OUTPATIENT
Start: 2018-04-29 | End: 2018-05-07 | Stop reason: HOSPADM

## 2018-04-28 RX ORDER — INSULIN GLARGINE 100 [IU]/ML
30 INJECTION, SOLUTION SUBCUTANEOUS
Status: DISCONTINUED | OUTPATIENT
Start: 2018-04-29 | End: 2018-04-28

## 2018-04-28 RX ORDER — ATORVASTATIN CALCIUM 80 MG/1
80 TABLET, FILM COATED ORAL
Status: DISCONTINUED | OUTPATIENT
Start: 2018-04-29 | End: 2018-05-07 | Stop reason: HOSPADM

## 2018-04-28 RX ORDER — POLYETHYLENE GLYCOL 3350 17 G/17G
17 POWDER, FOR SOLUTION ORAL DAILY
Status: DISCONTINUED | OUTPATIENT
Start: 2018-04-29 | End: 2018-04-29

## 2018-04-28 RX ORDER — ALBUTEROL SULFATE 90 UG/1
2 AEROSOL, METERED RESPIRATORY (INHALATION) EVERY 6 HOURS PRN
Status: DISCONTINUED | OUTPATIENT
Start: 2018-04-28 | End: 2018-05-01

## 2018-04-28 RX ORDER — PANTOPRAZOLE SODIUM 40 MG/1
40 TABLET, DELAYED RELEASE ORAL DAILY
Status: DISCONTINUED | OUTPATIENT
Start: 2018-04-29 | End: 2018-04-29

## 2018-04-28 RX ORDER — SODIUM CHLORIDE 9 MG/ML
75 INJECTION, SOLUTION INTRAVENOUS CONTINUOUS
Status: DISCONTINUED | OUTPATIENT
Start: 2018-04-28 | End: 2018-04-28

## 2018-04-28 RX ORDER — DIPHENHYDRAMINE HCL 50 MG
50 CAPSULE ORAL DAILY
Status: DISCONTINUED | OUTPATIENT
Start: 2018-04-29 | End: 2018-04-29

## 2018-04-28 RX ORDER — POTASSIUM CHLORIDE 20 MEQ/1
40 TABLET, EXTENDED RELEASE ORAL ONCE
Status: COMPLETED | OUTPATIENT
Start: 2018-04-28 | End: 2018-04-28

## 2018-04-28 RX ORDER — FLUTICASONE PROPIONATE 50 MCG
1 SPRAY, SUSPENSION (ML) NASAL DAILY
Status: DISCONTINUED | OUTPATIENT
Start: 2018-04-29 | End: 2018-04-29

## 2018-04-28 RX ORDER — DOCUSATE SODIUM 100 MG/1
100 CAPSULE, LIQUID FILLED ORAL 2 TIMES DAILY
Status: DISCONTINUED | OUTPATIENT
Start: 2018-04-29 | End: 2018-04-29

## 2018-04-28 RX ORDER — MELATONIN
1000 DAILY
Status: DISCONTINUED | OUTPATIENT
Start: 2018-04-29 | End: 2018-04-30

## 2018-04-28 RX ORDER — POTASSIUM CHLORIDE 750 MG/1
10 TABLET, EXTENDED RELEASE ORAL 2 TIMES DAILY
Status: DISCONTINUED | OUTPATIENT
Start: 2018-04-29 | End: 2018-04-29

## 2018-04-28 RX ORDER — LORATADINE 10 MG/1
10 TABLET ORAL DAILY
Status: DISCONTINUED | OUTPATIENT
Start: 2018-04-29 | End: 2018-05-07 | Stop reason: HOSPADM

## 2018-04-28 RX ORDER — LISINOPRIL 2.5 MG/1
2.5 TABLET ORAL
Status: DISCONTINUED | OUTPATIENT
Start: 2018-04-28 | End: 2018-04-29

## 2018-04-28 RX ORDER — ACETAMINOPHEN 325 MG/1
650 TABLET ORAL EVERY 6 HOURS PRN
Status: DISCONTINUED | OUTPATIENT
Start: 2018-04-28 | End: 2018-04-29

## 2018-04-28 RX ORDER — CALCIUM CARBONATE 200(500)MG
1000 TABLET,CHEWABLE ORAL 3 TIMES DAILY
Status: DISCONTINUED | OUTPATIENT
Start: 2018-04-28 | End: 2018-04-29

## 2018-04-28 RX ORDER — FLUTICASONE PROPIONATE 110 UG/1
1 AEROSOL, METERED RESPIRATORY (INHALATION) EVERY 12 HOURS SCHEDULED
Status: DISCONTINUED | OUTPATIENT
Start: 2018-04-28 | End: 2018-05-07 | Stop reason: HOSPADM

## 2018-04-28 RX ORDER — ASPIRIN 81 MG/1
81 TABLET ORAL DAILY
Status: DISCONTINUED | OUTPATIENT
Start: 2018-04-29 | End: 2018-04-29

## 2018-04-28 RX ORDER — FUROSEMIDE 40 MG/1
40 TABLET ORAL 2 TIMES DAILY
Status: DISCONTINUED | OUTPATIENT
Start: 2018-04-29 | End: 2018-04-29

## 2018-04-28 RX ADMIN — SODIUM CHLORIDE 1000 ML: 0.9 INJECTION, SOLUTION INTRAVENOUS at 22:06

## 2018-04-28 RX ADMIN — SODIUM CHLORIDE 500 ML: 0.9 INJECTION, SOLUTION INTRAVENOUS at 09:13

## 2018-04-28 RX ADMIN — POTASSIUM CHLORIDE 40 MEQ: 1500 TABLET, EXTENDED RELEASE ORAL at 11:43

## 2018-04-28 RX ADMIN — METOCLOPRAMIDE 10 MG: 5 INJECTION, SOLUTION INTRAMUSCULAR; INTRAVENOUS at 11:02

## 2018-04-28 RX ADMIN — METOCLOPRAMIDE 10 MG: 5 INJECTION, SOLUTION INTRAMUSCULAR; INTRAVENOUS at 21:27

## 2018-04-28 NOTE — ED PROVIDER NOTES
History  Chief Complaint   Patient presents with    Weakness - Generalized     pt c o weakness, shaking, and nausea for the past two days     Patient is a 27-year-old female with a past medical history significant for CAD, status post TAVR, hypertension, hyperlipidemia, diabetes, h/o recent CVA started on Xarelto, who presents with weakness, nausea, abdominal pain  Yesterday was feeling generally unwell with some fatigue and malaise  Reports that at around 3:00 a m  today started to feel nauseous with some generalized nonspecific abdominal pain that she is having difficulty describing to me  Overall, the patient mostly complains of feeling "weak"  Denies chest pain, shortness of breath, palpitations, lower extremity edema, diarrhea, constipation, blood in stools, dysuria, hematuria, fevers, chills, headache, neck pain/ stiffness  Assessment and Plan: Nausea; abdominal pain  CT abdomen pelvis, labs to evaluate for electrolyte abnormalities, kidney function, leukocytosis, anemia  Cardiac workup  Symptomatic management  Reassess  Prior to Admission Medications   Prescriptions Last Dose Informant Patient Reported? Taking? LANTUS 100 UNIT/ML subcutaneous injection   No Yes   Sig: Inject 20 Units under the skin 2 (two) times a day   Patient taking differently: Inject 30 Units under the skin 2 (two) times a day     Mometasone Furoate (ASMANEX HFA) 100 MCG/ACT AERO  Self No Yes   Sig: Inhale 2 puffs once daily   acetaminophen (TYLENOL) 650 mg CR tablet   No Yes   Sig: Take 1 tablet (650 mg total) by mouth every 8 (eight) hours as needed for mild pain Do not take in conjunction with Percocet     albuterol (VENTOLIN HFA) 90 mcg/act inhaler  Self Yes Yes   Sig: Inhale 108 mcg 2 (two) times a day as needed 2 puffs bid PRN   aspirin 81 MG tablet  Self Yes Yes   Sig: Take 81 mg by mouth daily   atorvastatin (LIPITOR) 80 mg tablet   No Yes   Sig: Take 1 tablet (80 mg total) by mouth daily   calcium carbonate (TUMS) 500 mg chewable tablet  Self Yes Yes   Sig: Chew 2 tablets 3 (three) times a day     cholecalciferol (VITAMIN D3) 1,000 units tablet  Self Yes Yes   Sig: Take 1,000 Units by mouth daily     dextromethorphan-guaiFENesin (ROBITUSSIN DM)  mg/5 mL syrup   Yes Yes   Sig: Take 10 mL by mouth every 4 (four) hours as needed for cough   diphenhydrAMINE (BANOPHEN) 50 mg capsule   Yes Yes   Sig: Take 50 mg by mouth daily   docusate sodium (COLACE) 100 mg capsule   No Yes   Sig: Take 1 capsule (100 mg total) by mouth 2 (two) times a day   esomeprazole (NexIUM) 20 mg capsule  Self Yes Yes   Sig: Take 20 mg by mouth daily in the early morning     fluticasone (FLONASE) 50 mcg/act nasal spray   Yes Yes   Si spray into each nostril daily   furosemide (LASIX) 40 mg tablet   Yes Yes   Sig: Take 40 mg by mouth 2 (two) times a day   glucose blood test strip   Yes Yes   Si each by Other route 4 (four) times a day (before meals and at bedtime) Use as instructed   levothyroxine 100 mcg tablet  Self Yes Yes   Sig: Take 100 mcg by mouth daily   lisinopril (ZESTRIL) 5 mg tablet  Self Yes Yes   Sig: Take 2 5 mg by mouth daily at bedtime     loratadine (CLARITIN) 10 mg tablet  Self No Yes   Sig: Take 1 tablet (10 mg total) by mouth daily   metFORMIN (GLUCOPHAGE) 1000 MG tablet  Self Yes Yes   Si,000 mg 2 (two) times a day   metoprolol tartrate (LOPRESSOR) 100 mg tablet   No Yes   Sig: Take 1 tablet (100 mg total) by mouth every 12 (twelve) hours   nitroglycerin (NITROSTAT) 0 4 mg SL tablet  Self Yes Yes   Sig: Place 0 4 mg under the tongue every 3 (three) minutes as needed   pantoprazole (PROTONIX) 40 mg tablet   Yes Yes   Sig: Take 40 mg by mouth daily   polyethylene glycol (MIRALAX) 17 g packet   Yes Yes   Sig: Take 17 g by mouth daily   potassium chloride (K-DUR,KLOR-CON) 10 mEq tablet   Yes Yes   Sig: Take 10 mEq by mouth 2 (two) times a day   rivaroxaban (XARELTO) 20 mg tablet   No Yes   Sig: Take 1 tablet (20 mg total) by mouth daily      Facility-Administered Medications: None       Past Medical History:   Diagnosis Date    Altered mental status     Anticoagulated     Coumadin for Aflutter    Asthma     Atrial flutter (HCC)     maintained on coumadin anticoag    CAD (coronary artery disease)     Candidiasis     Carotid stenosis, bilateral     Cataract     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) ([facility]     Chronic fatigue syndrome     Chronic low back pain     Concussion w loss of consciousness of unsp duration, init     Coronary artery disease     CVA (cerebral vascular accident) ([facility] ) 4/17/2018    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, insulin dependent    DJD (degenerative joint disease)     GERD (gastroesophageal reflux disease)     Herpes zoster     HLD (hyperlipidemia)     HTN (hypertension)     Hyperlipidemia     Hypothyroidism     Irritable bowel syndrome     Lumbar radiculopathy     Lyme disease     Dx in hospital 8/2011    Lyme disease     [facility]     Migraines     Muscle spasm     Non-neoplastic nevus     Nontoxic multinodular goiter     OA (osteoarthritis)     Osteoarthritis     Other chronic pain     PAD (peripheral artery disease) ([facility])     Palpitations     Pseudogout     Spinal stenosis     Transient cerebral ischemia     Trigger ring finger     Viral gastroenteritis        Past Surgical History:   Procedure Laterality Date    APPENDECTOMY      ARTERIOGRAM  12/19/2017    CARDIAC CATHETERIZATION      CHOLECYSTECTOMY      COLONOSCOPY      CORONARY ARTERY BYPASS GRAFT  2004    LUMBAR LAMINECTOMY      GA ECHO TRANSESOPHAG R-T 2D W/PRB IMG ACQUISJ I&R N/A 4/3/2018    Procedure: TRANSESOPHAGEAL ECHOCARDIOGRAM (ROBB);   Surgeon: Nyasia White DO;  [facility]: [facility];  Service: Cardiac Surgery    GA REPLACE AORTIC VALVE OPENFEMORAL ARTERY APPROACH N/A 4/3/2018    Procedure: REPLACEMENT AORTIC VALVE TRANSCATHETER (TAVR) TRANSFEMORAL w/ 26MM IVY YEVGENIY S3 VALVE;  Surgeon: Vanita Auguste DO;  [facility];  Service: Cardiac Surgery    THYROIDECTOMY      TOTAL ABDOMINAL HYSTERECTOMY W/ BILATERAL SALPINGOOPHORECTOMY         Family History   Problem Relation Age of Onset    Cancer Mother     Stroke Mother     Cancer Father     Heart disease Father     Breast cancer Sister     Diabetes Daughter      Passed away January 2017      I have reviewed and agree with the history as documented  Social History   Substance Use Topics    Smoking status: Never Smoker    Smokeless tobacco: Never Used    Alcohol use No        Review of Systems   Constitutional: Positive for activity change, appetite change and fatigue  Negative for chills and fever  HENT: Negative for congestion and rhinorrhea  Respiratory: Negative for cough, chest tightness and shortness of breath  Cardiovascular: Negative for chest pain, palpitations and leg swelling  Gastrointestinal: Positive for abdominal pain, nausea and vomiting  Negative for blood in stool, constipation and diarrhea  Genitourinary: Negative for dysuria and hematuria  Musculoskeletal: Negative for back pain, neck pain and neck stiffness  Skin: Negative for pallor and rash  Neurological: Positive for weakness  Negative for dizziness, syncope, speech difficulty, light-headedness, numbness and headaches  Psychiatric/Behavioral: Negative for confusion         Physical Exam  ED Triage Vitals   Temperature Pulse Respirations Blood Pressure SpO2   04/28/18 0851 04/28/18 0851 04/28/18 0851 04/28/18 0851 04/28/18 0851   98 2 °F (36 8 °C) 93 18 169/77 98 %      Temp src Heart Rate Source Patient Position - Orthostatic VS BP Location FiO2 (%)   -- 04/28/18 0851 04/28/18 0937 04/28/18 0937 --    Monitor Lying Right arm       Pain Score       04/28/18 0851       No Pain           Orthostatic Vital Signs  Vitals:    04/28/18 0915 04/28/18 0937 04/28/18 1027 04/28/18 1115   BP: (!) 204/86 129/72 136/90 132/88   Pulse: 84 86 85 86   Patient Position - Orthostatic VS:  Lying Lying        Physical Exam   Constitutional: She is oriented to person, place, and time  She appears well-developed and well-nourished  No distress  HENT:   Head: Normocephalic and atraumatic  Right Ear: External ear normal    Left Ear: External ear normal    Nose: Nose normal    Mouth/Throat: Oropharynx is clear and moist    Eyes: Conjunctivae and EOM are normal  Pupils are equal, round, and reactive to light  Neck: Normal range of motion  Neck supple  Cardiovascular: Normal rate, regular rhythm and intact distal pulses  Exam reveals no gallop and no friction rub  Murmur heard  Pulmonary/Chest: Effort normal  No respiratory distress  She has no wheezes  She has no rales  She exhibits no tenderness  Diminished BS BL   Abdominal: Soft  Bowel sounds are normal  She exhibits no distension  There is tenderness (diffuse, generalized TTP; old LLQ ecchymosis )  There is no rebound and no guarding  Musculoskeletal: Normal range of motion  She exhibits no edema or tenderness  Neurological: She is alert and oriented to person, place, and time  She has normal strength and normal reflexes  No cranial nerve deficit or sensory deficit  GCS eye subscore is 4  GCS verbal subscore is 5  GCS motor subscore is 6  Speech clear   Skin: Skin is warm and dry  Capillary refill takes less than 2 seconds  No rash noted  She is not diaphoretic  No erythema  No pallor  Chronic ulcerations on BL feet   Psychiatric: She has a normal mood and affect  Her behavior is normal    Nursing note and vitals reviewed        ED Medications  Medications   sodium chloride 0 9 % bolus 500 mL (0 mL Intravenous Stopped 4/28/18 1031)   potassium chloride (K-DUR,KLOR-CON) CR tablet 40 mEq (40 mEq Oral Given 4/28/18 1143)   metoclopramide (REGLAN) injection 10 mg (10 mg Intravenous Given 4/28/18 1102)       Diagnostic Studies  Results Reviewed     Procedure Component Value Units Date/Time    Urine Microscopic [[id number]]  (Abnormal) Collected:  04/28/18 1039    Lab Status:  Final result Specimen:  Urine from Urine, Other Updated:  04/28/18 1053     RBC, UA None Seen /hpf      WBC, UA None Seen /hpf      Epithelial Cells None Seen /hpf      Bacteria, UA None Seen /hpf      Hyaline Casts, UA 5-10 (A) /lpf     POCT urinalysis dipstick [[id number]]  (Normal) Resulted:  04/28/18 1035    Lab Status:  Final result Specimen:  Urine Updated:  04/28/18 1036     Color, UA yellow    ED Urine Macroscopic [[id number]]  (Abnormal) Collected:  04/28/18 1039    Lab Status:  Final result Specimen:  Urine Updated:  04/28/18 1035     Color, UA Yellow     Clarity, UA Clear     pH, UA 5 5     Leukocytes, UA Negative     Nitrite, UA Negative     Protein, UA 30 (1+) (A) mg/dl      Glucose, UA Negative mg/dl      Ketones, UA Negative mg/dl      Urobilinogen, UA 0 2 E U /dl      Bilirubin, UA Negative     Blood, UA Trace (A)     Specific [place], UA 1 025    Narrative:       CLINITEK RESULT    Comprehensive metabolic panel [[id number]]  (Abnormal) Collected:  04/28/18 0913    Lab Status:  Final result Specimen:  Blood from Arm, Left Updated:  04/28/18 1000     Sodium 141 mmol/L      Potassium 3 1 (L) mmol/L      Chloride 107 mmol/L      CO2 21 mmol/L      Anion Gap 13 mmol/L      BUN 9 mg/dL      Creatinine 0 70 mg/dL      Glucose 107 mg/dL      Calcium 6 2 (L) mg/dL      AST 33 U/L      ALT 19 U/L      Alkaline Phosphatase 74 U/L      Total Protein 7 6 g/dL      Albumin 2 9 (L) g/dL      Total Bilirubin 0 67 mg/dL      eGFR 82 ml/min/1 73sq m     Narrative:         National Kidney Disease Education Program recommendations are as follows:  GFR calculation is accurate only with a steady state creatinine  Chronic Kidney disease less than 60 ml/min/1 73 sq  meters  Kidney failure less than 15 ml/min/1 73 sq  meters      Lipase [[id number]]  (Normal) Collected: 04/28/18 0913    Lab Status:  Final result Specimen:  Blood from Arm, Left Updated:  04/28/18 1000     Lipase 139 u/L     TSH, 3rd generation with T4 reflex [[id number]]  (Normal) Collected:  04/28/18 0913    Lab Status:  Final result Specimen:  Blood from Arm, Left Updated:  04/28/18 1000     TSH 3RD GENERATON 1 650 uIU/mL     Narrative:         Patients undergoing fluorescein dye angiography may retain small amounts of fluorescein in the body for 48-72 hours post procedure  Samples containing fluorescein can produce falsely depressed TSH values  If the patient had this procedure,a specimen should be resubmitted post fluorescein clearance  The recommended reference ranges for TSH during pregnancy are as follows:  First trimester 0 1 to 2 5 uIU/mL  Second trimester  0 2 to 3 0 uIU/mL  Third trimester 0 3 to 3 0 uIU/m      Troponin I [[id number]]  (Normal) Collected:  04/28/18 0913    Lab Status:  Final result Specimen:  Blood from Arm, Left Updated:  04/28/18 0955     Troponin I 0 03 ng/mL     Narrative:         [employer] Chemistry analyzer 99% cutoff is > 0 04 ng/mL in network labs    o cTnI 99% cutoff is useful only when applied to patients in the clinical setting of myocardial ischemia  o cTnI 99% cutoff should be interpreted in the context of clinical history, ECG findings and possibly cardiac imaging to establish correct diagnosis  o cTnI 99% cutoff may be suggestive but clearly not indicative of a coronary event without the clinical setting of myocardial ischemia      Protime-INR [[id number]]  (Abnormal) Collected:  04/28/18 0913    Lab Status:  Final result Specimen:  Blood from Arm, Left Updated:  04/28/18 0943     Protime 40 0 (H) seconds      INR 4 04 (H)    APTT [[id number]]  (Abnormal) Collected:  04/28/18 0913    Lab Status:  Final result Specimen:  Blood from Arm, Left Updated:  04/28/18 0943     PTT 50 (H) seconds     CBC and differential [[id number]]  (Abnormal) Collected:  04/28/18 0913    Lab Status: Final result Specimen:  Blood from Arm, Left Updated:  04/28/18 0927     WBC 9 30 Thousand/uL      RBC 4 56 Million/uL      Hemoglobin 9 5 (L) g/dL      Hematocrit 30 7 (L) %      MCV 67 (L) fL      MCH 20 8 (L) pg      MCHC 30 9 (L) g/dL      RDW 18 8 (H) %      MPV 10 7 fL      Platelets 853 Thousands/uL      nRBC 0 /100 WBCs      Neutrophils Relative 71 %      Lymphocytes Relative 21 %      Monocytes Relative 6 %      Eosinophils Relative 2 %      Basophils Relative 0 %      Neutrophils Absolute 6 59 Thousands/µL      Lymphocytes Absolute 1 93 Thousands/µL      Monocytes Absolute 0 59 Thousand/µL      Eosinophils Absolute 0 14 Thousand/µL      Basophils Absolute 0 03 Thousands/µL                  XR chest 2 views   ED Interpretation by José Manuel Ramirez DO (04/28 0930)   No acute pulmonary disease as interpreted by me independently       Final Result by Jeremy Atkinson DO (04/28 1027)      No acute cardiopulmonary disease  Workstation performed: [id number]         CT abdomen pelvis wo contrast   Final Result by Christ Mcdowell DO (04/28 1007)   No acute abdominal pelvic pathology  No significant change from prior  Workstation performed: [id number]               Procedures  ECG 12 Lead Documentation  Date/Time: 4/28/2018 9:42 AM  Performed by: Izabel Woody  Authorized by: [employer]     Indications / Diagnosis:  Weakness  ECG reviewed by me, the ED Provider: yes    Patient location:  ED  Previous ECG:     Previous ECG:  Compared to current    Comparison ECG info:  04/16/2018    Similarity:  No change  Interpretation:     Interpretation: non-specific    Rate:     ECG rate:  101    ECG rate assessment: tachycardic    Rhythm:     Rhythm: sinus rhythm    Ectopy:     Ectopy: PVCs      PVCs:  Infrequent  QRS:     QRS axis:  Left    QRS intervals:   Wide  Conduction:     Conduction: abnormal      Abnormal conduction: complete LBBB and 1st degree    ST segments:     ST segments:  Non-specific  T waves:     T waves: non-specific    Other findings:     Other findings: prolonged qTc interval            Phone Consults  ED Phone Contact    ED Course  ED Course as of Apr 28 2320   Sat Apr 28, 2018   2528 Trending down Troponin I: 0 03   1100 supratherapeutic INR: (!) 4 04   1101 stable Hemoglobin: (!) 9 5   1130 Had a shared decision making discussion with the patient and her family  Explained that there are no significant lab abnormalities and that the CT of the abdomen pelvis as well as a chest x-ray and urinalysis all appear to be within normal limits  Aside from hypokalemia which we will give patient 40 mEq of K-Dur for and a supratherapeutic INR, but patient was recently discontinued on coumadin, probably residual effect  Discussed failure to thrive with family and the risks/benefits of admission versus discharge home  At this time, the patient and her family believe that she would benefit from going home and will watch her closely  Will follow up with their primary care provider in 3-5 days for re-evaluation  Patient and family had no further questions at this time  Identification of Seniors at Risk      Most Recent Value   (ISAR) Identification of Seniors at Risk   Before the illness or injury that brought you to the Emergency, did you need someone to help you on a regular basis? 0 Filed at: 04/28/2018 0852   In the last 24 hours, have you needed more help than usual?  1 Filed at: 04/28/2018 6989   Have you been hospitalized for one or more nights during the past 6 months? 1 Filed at: 04/28/2018 0852   In general, do you see well? 1 Filed at: 04/28/2018 1120   In general, do you have serious problems with your memory? 0 Filed at: 04/28/2018 9519   Do you take more than three different medications every day?   1 Filed at: 04/28/2018 0852   ISAR Score  4 Filed at: 04/28/2018 5277                          [facility] Time    Disposition  Final diagnoses:   Hypokalemia   Weakness   Nausea Generalized abdominal pain   Supratherapeutic INR     Time reflects when diagnosis was documented in both MDM as applicable and the Disposition within this note     Time User Action Codes Description Comment    4/28/2018 10:01 AM Cristian Jason Add [E87 6] Hypokalemia     4/28/2018 10:01 AM Cristian Jason Add [R53 1] Weakness     4/28/2018 10:02 AM Cristian Jason Add [R11 0] Nausea     4/28/2018 10:02 AM Cristian Jason Add [R10 84] Generalized abdominal pain     4/28/2018 11:33 AM Cristian Jason Add [R79 1] Supratherapeutic INR       ED Disposition     ED Disposition Condition Comment    Discharge  Lloyd Moore discharge to home/self care      Condition at discharge: Stable        Follow-up Information     Follow up With Specialties Details Why Contact Info Additional [id number] [place] Gina Fabian MD Internal Medicine Schedule an appointment as soon as possible for a visit in 3 days for re-evaluation [place] Emergency Department Emergency Medicine Go to for re-evaluation, As needed, If symptoms worsen [address] [place] [address], [address] [place] [address], [id number]        Discharge Medication List as of 4/28/2018 11:34 AM      CONTINUE these medications which have NOT CHANGED    Details   aspirin 81 MG tablet Take 81 mg by mouth daily, Historical Med      atorvastatin (LIPITOR) 80 mg tablet Take 1 tablet (80 mg total) by mouth daily, Starting Thu 4/26/2018, Normal      calcium carbonate (TUMS) 500 mg chewable tablet Chew 2 tablets 3 (three) times a day  , Historical Med      cholecalciferol (VITAMIN D3) 1,000 units tablet Take 1,000 Units by mouth daily  , Starting Thu 10/20/2016, Historical Med      diphenhydrAMINE (BANOPHEN) 50 mg capsule Take 50 mg by mouth daily, Historical Med      docusate sodium (COLACE) 100 mg capsule Take 1 capsule (100 mg total) by mouth 2 (two) times a day, Starting Fri 4/6/2018, Normal      esomeprazole (NexIUM) 20 mg capsule Take 20 mg by mouth daily in the early morning  , Historical Med      fluticasone (FLONASE) 50 mcg/act nasal spray 1 spray into each nostril daily, Historical Med      furosemide (LASIX) 40 mg tablet Take 40 mg by mouth 2 (two) times a day, Historical Med      glucose blood test strip 1 each by Other route 4 (four) times a day (before meals and at bedtime) Use as instructed, Historical Med      LANTUS 100 UNIT/ML subcutaneous injection Inject 20 Units under the skin 2 (two) times a day, Starting Thu 4/19/2018, No Print      levothyroxine 100 mcg tablet Take 100 mcg by mouth daily, Historical Med      lisinopril (ZESTRIL) 5 mg tablet Take 2 5 mg by mouth daily at bedtime  , Historical Med      loratadine (CLARITIN) 10 mg tablet Take 1 tablet (10 mg total) by mouth daily, Starting Wed 3/28/2018, Normal      metFORMIN (GLUCOPHAGE) 1000 MG tablet 1,000 mg 2 (two) times a day, Historical Med      metoprolol tartrate (LOPRESSOR) 100 mg tablet Take 1 tablet (100 mg total) by mouth every 12 (twelve) hours, Starting Fri 4/6/2018, No Print      Mometasone Furoate (ASMANEX HFA) 100 MCG/ACT AERO Inhale 2 puffs once daily, Normal      nitroglycerin (NITROSTAT) 0 4 mg SL tablet Place 0 4 mg under the tongue every 3 (three) minutes as needed, Starting Mon 5/11/2015, Historical Med      pantoprazole (PROTONIX) 40 mg tablet Take 40 mg by mouth daily, Historical Med      polyethylene glycol (MIRALAX) 17 g packet Take 17 g by mouth daily, Historical Med      potassium chloride (K-DUR,KLOR-CON) 10 mEq tablet Take 10 mEq by mouth 2 (two) times a day, Historical Med      rivaroxaban (XARELTO) 20 mg tablet Take 1 tablet (20 mg total) by mouth daily, Starting Thu 4/19/2018, No Print      acetaminophen (TYLENOL) 650 mg CR tablet Take 1 tablet (650 mg total) by mouth every 8 (eight) hours as needed for mild pain Do not take in conjunction with Percocet , Starting Fri 4/6/2018, Normal      albuterol (VENTOLIN HFA) 90 mcg/act inhaler Inhale 108 mcg 2 (two) times a day as needed 2 puffs bid PRN, Historical Med      dextromethorphan-guaiFENesin (ROBITUSSIN DM)  mg/5 mL syrup Take 10 mL by mouth every 4 (four) hours as needed for cough, Historical Med           No discharge procedures on file  ED Provider  Attending physically available and evaluated Tiffanie Givens I managed the patient along with the ED Attending      Electronically Signed by         Con De Oliveira DO  04/28/18 6937

## 2018-04-28 NOTE — DISCHARGE INSTRUCTIONS
Acute Nausea and Vomiting   WHAT YOU NEED TO KNOW:   Acute nausea and vomiting start suddenly, worsen quickly, and last a short time  DISCHARGE INSTRUCTIONS:   Return to the emergency department if:   · You see blood in your vomit or your bowel movements  · You have sudden, severe pain in your chest and upper abdomen after hard vomiting or retching  · You have swelling in your neck and chest      · You are dizzy, cold, and thirsty and your eyes and mouth are dry  · You are urinating very little or not at all  · You have muscle weakness, leg cramps, and trouble breathing  · Your heart is beating much faster than normal      · You continue to vomit for more than 48 hours  Contact your healthcare provider if:   · You have frequent dry heaves (vomiting but nothing comes out)  · Your nausea and vomiting does not get better or go away after you use medicine  · You have questions or concerns about your condition or treatment  Medicines: You may need any of the following:  · Medicines  may be given to calm your stomach and stop your vomiting  You may also need medicines to help you feel more relaxed or to stop nausea and vomiting caused by motion sickness  · Gastrointestinal stimulants  are used to help empty your stomach and bowels  This may help decrease nausea and vomiting  · Take your medicine as directed  Contact your healthcare provider if you think your medicine is not helping or if you have side effects  Tell him or her if you are allergic to any medicine  Keep a list of the medicines, vitamins, and herbs you take  Include the amounts, and when and why you take them  Bring the list or the pill bottles to follow-up visits  Carry your medicine list with you in case of an emergency  Prevent or manage acute nausea and vomiting:   · Do not drink alcohol  Alcohol may upset or irritate your stomach  Too much alcohol can also cause acute nausea and vomiting  · Control stress  Headaches due to stress may cause nausea and vomiting  Find ways to relax and manage your stress  Get more rest and sleep  · Drink more liquids as directed  Vomiting can lead to dehydration  It is important to drink more liquids to help replace lost body fluids  Ask your healthcare provider how much liquid to drink each day and which liquids are best for you  Your provider may recommend that you drink an oral rehydration solution (ORS)  ORS contains water, salts, and sugar that are needed to replace the lost body fluids  Ask what kind of ORS to use, how much to drink, and where to get it  · Eat smaller meals, more often  Eat small amounts of food every 2 to 3 hours, even if you are not hungry  Food in your stomach may decrease your nausea  · Talk to your healthcare provider before you take over-the-counter (OTC) medicines  These medicines can cause serious problems if you use certain other medicines, or you have a medical condition  You may have problems if you use too much or use them for longer than the label says  Follow directions on the label carefully  Follow up with your healthcare provider as directed:  Write down your questions so you remember to ask them during your follow-up visits  © 2017 [facility] Information is for End User's use only and may not be sold, redistributed or otherwise used for commercial purposes  All illustrations and images included in CareNotes® are the copyrighted property of [employer]  or [employer] Felipe Spaulding  The above information is an  only  It is not intended as medical advice for individual conditions or treatments  Talk to your doctor, nurse or pharmacist before following any medical regimen to see if it is safe and effective for you  Acute Abdominal Pain   WHAT YOU NEED TO KNOW:   The cause of your abdominal pain may not be found  If a cause is found, treatment will depend on what the cause is     DISCHARGE INSTRUCTIONS:   Return to the emergency department if:   · You vomit blood or cannot stop vomiting  · You have blood in your bowel movement or it looks like tar  · You have bleeding from your rectum  · Your abdomen is larger than usual, more painful, and hard  · You have severe pain in your abdomen  · You stop passing gas and having bowel movements  · You feel weak, dizzy, or faint  Contact your healthcare provider if:   · You have a fever  · You have new signs and symptoms  · Your symptoms do not get better with treatment  · You have questions or concerns about your condition or care  Medicines  may be given to decrease pain, treat an infection, and manage your symptoms  Take your medicine as directed  Call your healthcare provider if you think your medicine is not helping or if you have side effects  Tell him if you are allergic to any medicine  Keep a list of the medicines, vitamins, and herbs you take  Include the amounts, and when and why you take them  Bring the list or the pill bottles to follow-up visits  Carry your medicine list with you in case of an emergency  Manage your symptoms:   · Apply heat  on your abdomen for 20 to 30 minutes every 2 hours for as many days as directed  Heat helps decrease pain and muscle spasms  · Manage your stress  Stress may cause abdominal pain  Your healthcare provider may recommend relaxation techniques and deep breathing exercises to help decrease your stress  Your healthcare provider may recommend you talk to someone about your stress or anxiety, such as a counselor or a trusted friend  Get plenty of sleep and exercise regularly  · Limit or do not drink alcohol  Alcohol can make your abdominal pain worse  Ask your healthcare provider if it is safe for you to drink alcohol  Also ask how much is safe for you to drink  · Do not smoke  Nicotine and other chemicals in cigarettes can damage your esophagus and stomach   Ask your healthcare provider for information if you currently smoke and need help to quit  E-cigarettes or smokeless tobacco still contain nicotine  Talk to your healthcare provider before you use these products  Make changes to the food you eat as directed:  Do not eat foods that cause abdominal pain or other symptoms  Eat small meals more often  · Eat more high-fiber foods if you are constipated  High-fiber foods include fruits, vegetables, whole-grain foods, and legumes  · Do not eat foods that cause gas if you have bloating  Examples include broccoli, cabbage, and cauliflower  Do not drink soda or carbonated drinks, because these may also cause gas  · Do not eat foods or drinks that contain sorbitol or fructose if you have diarrhea and bloating  Some examples are fruit juices, candy, jelly, and sugar-free gum  · Do not eat high-fat foods, such as fried foods, cheeseburgers, hot dogs, and desserts  · Limit or do not drink caffeine  Caffeine may make symptoms, such as heart burn or nausea, worse  · Drink plenty of liquids to prevent dehydration from diarrhea or vomiting  Ask your healthcare provider how much liquid to drink each day and which liquids are best for you  Follow up with your healthcare provider as directed:  Write down your questions so you remember to ask them during your visits  © 2017 [address]  Information is for End User's use only and may not be sold, redistributed or otherwise used for commercial purposes  All illustrations and images included in CareNotes® are the copyrighted property of A D A M [employer]  or [employer] Felipe Spaulding  The above information is an  only  It is not intended as medical advice for individual conditions or treatments  Talk to your doctor, nurse or pharmacist before following any medical regimen to see if it is safe and effective for you        Hypokalemia   WHAT YOU NEED TO KNOW:   Hypokalemia is a low level of potassium in your blood  Potassium helps control how your muscles, heart, and digestive system work  Hypokalemia occurs when your body loses too much potassium or does not absorb enough from food  DISCHARGE INSTRUCTIONS:   Return to the emergency department if:   · You cannot move your arm or leg  · You have a fast or irregular heartbeat  · You are too tired or weak to stand up  Contact your healthcare provider if:   · You are vomiting, or you have diarrhea  · You have numbness or tingling in your arms or legs  · Your symptoms do not go away or they get worse  · You have questions or concerns about your condition or care  Medicines:   · Potassium  will be given to bring your potassium levels back to normal     · Take your medicine as directed  Contact your healthcare provider if you think your medicine is not helping or if you have side effects  Tell him of her if you are allergic to any medicine  Keep a list of the medicines, vitamins, and herbs you take  Include the amounts, and when and why you take them  Bring the list or the pill bottles to follow-up visits  Carry your medicine list with you in case of an emergency  Eat foods that are high in potassium:  Foods that are high in potassium include bananas, oranges, tomatoes, potatoes, and avocado  Velazquez beans, turkey, salmon, lean beef, yogurt, and milk are also high in potassium  Ask your healthcare provider or dietitian for more information about foods that are high in potassium  Follow up with your healthcare provider as directed:  Write down your questions so you remember to ask them during your visits  © 2017 [address] [employer] Information is for End User's use only and may not be sold, redistributed or otherwise used for commercial purposes  All illustrations and images included in CareNotes® are the copyrighted property of [employer]  or Luis Felipe Spaulding  The above information is an  only   It is not intended as medical advice for individual conditions or treatments  Talk to your doctor, nurse or pharmacist before following any medical regimen to see if it is safe and effective for you  Elevated INR   WHAT YOU NEED TO KNOW:   The INR, or International Normalized Ratio, is a measure of how long it takes your blood to clot  A prothrombin time (PT) is a another blood test done to help measure your INR  The higher your PT or INR, the longer your blood takes to clot  An elevated PT or INR means your blood is taking longer to clot than your healthcare provider believes is healthy for you  When your PT or INR is too high, you have an increased risk of bleeding  DISCHARGE INSTRUCTIONS:   Prevent an elevated INR:   · Have your INR measured regularly  You may need to have your INR measured every few days until it is stable, and then only once a month  You may have blood drawn at the office of either your healthcare provider or a specialist  Some people can test their blood at home  · If you take medicine, take it as directed  Contact your healthcare provider or specialist before you take other medicines or supplements, because they may elevate your INR  · Eat the same amount of vitamin K daily  to keep your INR stable  Vitamin K changes how your blood clots and affects your INR  Vitamin K is found in green leafy vegetables, broccoli, grapes, and other foods  Ask your healthcare provider for more information about what to eat when you have an elevated INR  · Limit alcohol  Alcohol increases your INR  Ask your healthcare provider or specialist how much alcohol is safe for you  · Do not smoke  If you smoke, it is never too late to quit  Smoking can affect the way your blood clots  Ask for information if you need help quitting  Decrease your risk of bleeding:   · Avoid activities  that may cause bleeding or bruising  · Brush and shave gently    Use a soft toothbrush and an electric razor to avoid bleeding  · Tell your dentist and other healthcare providers  if you take anticoagulant medicine or have a bleeding disorder  Wear medical alert jewelry, or carry a card that gives this information  Ask where you can get these items  Follow up with your healthcare provider or specialist as directed: You may need to return to have more tests  Write down your questions so you remember to ask them during your visits  Contact your healthcare provider or specialist if:   · Your menstrual period is heavier than normal     · You see blood in your urine  · Your bowel movement is bloody or black  · You bruise or bleed more than normal, your gums bleed, or you have frequent nosebleeds  · You have pain or swelling in your joints  · Your fingers or toes turn dark purple  · You have more headaches than normal, or your headaches are different than before  · You have questions or concerns about your care  Return to the emergency department if:   · You throw up blood, or your vomit looks like coffee grounds  · You have any kind of bleeding that does not stop in 15 minutes  · Your leg feels warm, tender, and painful  It may look swollen and red  · You have any of the following signs of a stroke:      ¨ Numbness or drooping on one side of your face     ¨ Weakness in an arm or leg    ¨ Confusion or difficulty speaking    ¨ Dizziness, a severe headache, or vision loss  © 2017 [address] Information is for End User's use only and may not be sold, redistributed or otherwise used for commercial purposes  All illustrations and images included in CareNotes® are the copyrighted property of A D A M [employer]  or Luis Felipe Spaulding  The above information is an  only  It is not intended as medical advice for individual conditions or treatments  Talk to your doctor, nurse or pharmacist before following any medical regimen to see if it is safe and effective for you

## 2018-04-29 ENCOUNTER — APPOINTMENT (OUTPATIENT)
Dept: RADIOLOGY | Facility: HOSPITAL | Age: 78
DRG: 056 | End: 2018-04-29
Payer: MEDICARE

## 2018-04-29 ENCOUNTER — APPOINTMENT (INPATIENT)
Dept: NEUROLOGY | Facility: AMBULATORY SURGERY CENTER | Age: 78
DRG: 056 | End: 2018-04-29
Payer: MEDICARE

## 2018-04-29 ENCOUNTER — APPOINTMENT (INPATIENT)
Dept: RADIOLOGY | Facility: HOSPITAL | Age: 78
DRG: 056 | End: 2018-04-29
Payer: MEDICARE

## 2018-04-29 PROBLEM — R56.9 SEIZURE (HCC): Status: ACTIVE | Noted: 2018-04-29

## 2018-04-29 PROBLEM — I49.3 PVC (PREMATURE VENTRICULAR CONTRACTION): Status: ACTIVE | Noted: 2018-04-29

## 2018-04-29 PROBLEM — R79.1 SUPRATHERAPEUTIC INR: Status: ACTIVE | Noted: 2018-04-29

## 2018-04-29 LAB
ALBUMIN SERPL BCP-MCNC: 2.9 G/DL (ref 3.5–5)
ALBUMIN SERPL BCP-MCNC: 2.9 G/DL (ref 3.5–5)
ALP SERPL-CCNC: 70 U/L (ref 46–116)
ALP SERPL-CCNC: 73 U/L (ref 46–116)
ALT SERPL W P-5'-P-CCNC: 19 U/L (ref 12–78)
ALT SERPL W P-5'-P-CCNC: 21 U/L (ref 12–78)
ANION GAP SERPL CALCULATED.3IONS-SCNC: 10 MMOL/L (ref 4–13)
ANION GAP SERPL CALCULATED.3IONS-SCNC: 10 MMOL/L (ref 4–13)
ANION GAP SERPL CALCULATED.3IONS-SCNC: 16 MMOL/L (ref 4–13)
ANION GAP SERPL CALCULATED.3IONS-SCNC: 24 MMOL/L (ref 4–13)
APTT PPP: 53 SECONDS (ref 23–35)
APTT PPP: 62 SECONDS (ref 23–35)
ARTERIAL PATENCY WRIST A: YES
ARTERIAL PATENCY WRIST A: YES
AST SERPL W P-5'-P-CCNC: 30 U/L (ref 5–45)
AST SERPL W P-5'-P-CCNC: 43 U/L (ref 5–45)
ATRIAL RATE: 84 BPM
ATRIAL RATE: 85 BPM
BASE EXCESS BLDA CALC-SCNC: -14 MMOL/L (ref -2–3)
BASE EXCESS BLDA CALC-SCNC: -2.6 MMOL/L
BASE EXCESS BLDA CALC-SCNC: -7.8 MMOL/L
BILIRUB SERPL-MCNC: 0.81 MG/DL (ref 0.2–1)
BILIRUB SERPL-MCNC: 0.83 MG/DL (ref 0.2–1)
BODY TEMPERATURE: 99.3 DEGREES FEHRENHEIT
BUN SERPL-MCNC: 12 MG/DL (ref 5–25)
BUN SERPL-MCNC: 14 MG/DL (ref 5–25)
BUN SERPL-MCNC: 15 MG/DL (ref 5–25)
BUN SERPL-MCNC: 15 MG/DL (ref 5–25)
CA-I BLD-SCNC: 0.81 MMOL/L (ref 1.12–1.32)
CA-I BLD-SCNC: 0.82 MMOL/L (ref 1.12–1.32)
CALCIUM SERPL-MCNC: 5.9 MG/DL (ref 8.3–10.1)
CALCIUM SERPL-MCNC: 5.9 MG/DL (ref 8.3–10.1)
CALCIUM SERPL-MCNC: 6.2 MG/DL (ref 8.3–10.1)
CALCIUM SERPL-MCNC: 6.3 MG/DL (ref 8.3–10.1)
CHLORIDE SERPL-SCNC: 106 MMOL/L (ref 100–108)
CHLORIDE SERPL-SCNC: 108 MMOL/L (ref 100–108)
CHLORIDE SERPL-SCNC: 109 MMOL/L (ref 100–108)
CHLORIDE SERPL-SCNC: 110 MMOL/L (ref 100–108)
CO2 SERPL-SCNC: 11 MMOL/L (ref 21–32)
CO2 SERPL-SCNC: 15 MMOL/L (ref 21–32)
CO2 SERPL-SCNC: 23 MMOL/L (ref 21–32)
CO2 SERPL-SCNC: 24 MMOL/L (ref 21–32)
CREAT SERPL-MCNC: 0.73 MG/DL (ref 0.6–1.3)
CREAT SERPL-MCNC: 0.82 MG/DL (ref 0.6–1.3)
CREAT SERPL-MCNC: 0.94 MG/DL (ref 0.6–1.3)
CREAT SERPL-MCNC: 1.13 MG/DL (ref 0.6–1.3)
ERYTHROCYTE [DISTWIDTH] IN BLOOD BY AUTOMATED COUNT: 19 % (ref 11.6–15.1)
FLUAV AG SPEC QL: NORMAL
FLUBV AG SPEC QL: NORMAL
GFR SERPL CREATININE-BSD FRML MDRD: 46 ML/MIN/1.73SQ M
GFR SERPL CREATININE-BSD FRML MDRD: 57 ML/MIN/1.73SQ M
GFR SERPL CREATININE-BSD FRML MDRD: 68 ML/MIN/1.73SQ M
GFR SERPL CREATININE-BSD FRML MDRD: 78 ML/MIN/1.73SQ M
GLUCOSE SERPL-MCNC: 111 MG/DL (ref 65–140)
GLUCOSE SERPL-MCNC: 116 MG/DL (ref 65–140)
GLUCOSE SERPL-MCNC: 124 MG/DL (ref 65–140)
GLUCOSE SERPL-MCNC: 127 MG/DL (ref 65–140)
GLUCOSE SERPL-MCNC: 132 MG/DL (ref 65–140)
GLUCOSE SERPL-MCNC: 143 MG/DL (ref 65–140)
GLUCOSE SERPL-MCNC: 171 MG/DL (ref 65–140)
GLUCOSE SERPL-MCNC: 192 MG/DL (ref 65–140)
GLUCOSE SERPL-MCNC: 204 MG/DL (ref 65–140)
GLUCOSE SERPL-MCNC: 219 MG/DL (ref 65–140)
GLUCOSE SERPL-MCNC: 220 MG/DL (ref 65–140)
GLUCOSE SERPL-MCNC: 238 MG/DL (ref 65–140)
GLUCOSE SERPL-MCNC: 75 MG/DL (ref 65–140)
GLUCOSE SERPL-MCNC: 77 MG/DL (ref 65–140)
GLUCOSE SERPL-MCNC: 84 MG/DL (ref 65–140)
HCO3 BLDA-SCNC: 13.3 MMOL/L (ref 24–30)
HCO3 BLDA-SCNC: 16.3 MMOL/L (ref 22–28)
HCO3 BLDA-SCNC: 20.7 MMOL/L (ref 22–28)
HCT VFR BLD AUTO: 33.4 % (ref 34.8–46.1)
HCT VFR BLD CALC: 31 % (ref 34.8–46.1)
HGB BLD-MCNC: 9.9 G/DL (ref 11.5–15.4)
HGB BLDA-MCNC: 10.5 G/DL (ref 11.5–15.4)
INR PPP: 10.5 (ref 0.86–1.16)
INR PPP: 3.75 (ref 0.86–1.16)
INR PPP: 6.94 (ref 0.86–1.16)
LACTATE SERPL-SCNC: 1 MMOL/L (ref 0.5–2)
LACTATE SERPL-SCNC: 1.2 MMOL/L (ref 0.5–2)
LACTATE SERPL-SCNC: 1.8 MMOL/L (ref 0.5–2)
LACTATE SERPL-SCNC: 3.1 MMOL/L (ref 0.5–2)
LACTATE SERPL-SCNC: 3.8 MMOL/L (ref 0.5–2)
MAGNESIUM SERPL-MCNC: 0.5 MG/DL (ref 1.6–2.6)
MAGNESIUM SERPL-MCNC: 1.6 MG/DL (ref 1.6–2.6)
MAGNESIUM SERPL-MCNC: 1.8 MG/DL (ref 1.6–2.6)
MCH RBC QN AUTO: 20.8 PG (ref 26.8–34.3)
MCHC RBC AUTO-ENTMCNC: 29.6 G/DL (ref 31.4–37.4)
MCV RBC AUTO: 70 FL (ref 82–98)
NASAL CANNULA: 4
O2 CT BLDA-SCNC: 12.4 ML/DL (ref 16–23)
O2 CT BLDA-SCNC: 13.9 ML/DL (ref 16–23)
OXYHGB MFR BLDA: 95.8 % (ref 94–97)
OXYHGB MFR BLDA: 97.7 % (ref 94–97)
P AXIS: 60 DEGREES
P AXIS: 64 DEGREES
PCO2 BLD: 14 MMOL/L (ref 21–32)
PCO2 BLD: 36.6 MM HG (ref 42–50)
PCO2 BLDA: 28.4 MM HG (ref 36–44)
PCO2 BLDA: 30.4 MM HG (ref 36–44)
PCO2 TEMP ADJ BLDA: 30.9 MM HG (ref 36–44)
PH BLD: 7.17 [PH] (ref 7.3–7.4)
PH BLD: 7.45 [PH] (ref 7.35–7.45)
PH BLDA: 7.38 [PH] (ref 7.35–7.45)
PH BLDA: 7.45 [PH] (ref 7.35–7.45)
PLATELET # BLD AUTO: 359 THOUSANDS/UL (ref 149–390)
PMV BLD AUTO: 10.8 FL (ref 8.9–12.7)
PO2 BLD: 131.1 MM HG (ref 75–129)
PO2 BLD: >400 MM HG (ref 35–45)
PO2 BLDA: 128.6 MM HG (ref 75–129)
PO2 BLDA: 91.9 MM HG (ref 75–129)
POTASSIUM BLD-SCNC: 3.5 MMOL/L (ref 3.5–5.3)
POTASSIUM SERPL-SCNC: 3.5 MMOL/L (ref 3.5–5.3)
POTASSIUM SERPL-SCNC: 3.5 MMOL/L (ref 3.5–5.3)
POTASSIUM SERPL-SCNC: 3.7 MMOL/L (ref 3.5–5.3)
POTASSIUM SERPL-SCNC: 3.8 MMOL/L (ref 3.5–5.3)
PR INTERVAL: 238 MS
PR INTERVAL: 250 MS
PROT SERPL-MCNC: 7.3 G/DL (ref 6.4–8.2)
PROT SERPL-MCNC: 7.5 G/DL (ref 6.4–8.2)
PROTHROMBIN TIME: 37.7 SECONDS (ref 12.1–14.4)
PROTHROMBIN TIME: 61.4 SECONDS (ref 12.1–14.4)
PROTHROMBIN TIME: 85.4 SECONDS (ref 12.1–14.4)
PTH-INTACT SERPL-MCNC: 11.8 PG/ML (ref 18.4–80.1)
QRS AXIS: 226 DEGREES
QRS AXIS: 263 DEGREES
QRSD INTERVAL: 146 MS
QRSD INTERVAL: 150 MS
QT INTERVAL: 430 MS
QT INTERVAL: 471 MS
QTC INTERVAL: 511 MS
QTC INTERVAL: 557 MS
RBC # BLD AUTO: 4.76 MILLION/UL (ref 3.81–5.12)
RSV B RNA SPEC QL NAA+PROBE: NORMAL
SODIUM BLD-SCNC: 141 MMOL/L (ref 136–145)
SODIUM SERPL-SCNC: 139 MMOL/L (ref 136–145)
SODIUM SERPL-SCNC: 141 MMOL/L (ref 136–145)
SODIUM SERPL-SCNC: 143 MMOL/L (ref 136–145)
SODIUM SERPL-SCNC: 143 MMOL/L (ref 136–145)
SPECIMEN SOURCE: ABNORMAL
T WAVE AXIS: 104 DEGREES
T WAVE AXIS: 79 DEGREES
TSH SERPL DL<=0.05 MIU/L-ACNC: 1.94 UIU/ML (ref 0.36–3.74)
VENTRICULAR RATE: 84 BPM
VENTRICULAR RATE: 85 BPM
WBC # BLD AUTO: 15.93 THOUSAND/UL (ref 4.31–10.16)

## 2018-04-29 PROCEDURE — 80053 COMPREHEN METABOLIC PANEL: CPT | Performed by: INTERNAL MEDICINE

## 2018-04-29 PROCEDURE — 3E0G76Z INTRODUCTION OF NUTRITIONAL SUBSTANCE INTO UPPER GI, VIA NATURAL OR ARTIFICIAL OPENING: ICD-10-PCS | Performed by: INTERNAL MEDICINE

## 2018-04-29 PROCEDURE — 83735 ASSAY OF MAGNESIUM: CPT | Performed by: PHYSICIAN ASSISTANT

## 2018-04-29 PROCEDURE — 71045 X-RAY EXAM CHEST 1 VIEW: CPT

## 2018-04-29 PROCEDURE — 82805 BLOOD GASES W/O2 SATURATION: CPT | Performed by: INTERNAL MEDICINE

## 2018-04-29 PROCEDURE — 84295 ASSAY OF SERUM SODIUM: CPT

## 2018-04-29 PROCEDURE — 80048 BASIC METABOLIC PNL TOTAL CA: CPT | Performed by: PHYSICIAN ASSISTANT

## 2018-04-29 PROCEDURE — 93010 ELECTROCARDIOGRAM REPORT: CPT | Performed by: INTERNAL MEDICINE

## 2018-04-29 PROCEDURE — 83735 ASSAY OF MAGNESIUM: CPT | Performed by: INTERNAL MEDICINE

## 2018-04-29 PROCEDURE — 82948 REAGENT STRIP/BLOOD GLUCOSE: CPT

## 2018-04-29 PROCEDURE — 87631 RESP VIRUS 3-5 TARGETS: CPT | Performed by: INTERNAL MEDICINE

## 2018-04-29 PROCEDURE — 82330 ASSAY OF CALCIUM: CPT

## 2018-04-29 PROCEDURE — 84443 ASSAY THYROID STIM HORMONE: CPT | Performed by: STUDENT IN AN ORGANIZED HEALTH CARE EDUCATION/TRAINING PROGRAM

## 2018-04-29 PROCEDURE — 85610 PROTHROMBIN TIME: CPT | Performed by: STUDENT IN AN ORGANIZED HEALTH CARE EDUCATION/TRAINING PROGRAM

## 2018-04-29 PROCEDURE — 93005 ELECTROCARDIOGRAM TRACING: CPT

## 2018-04-29 PROCEDURE — 80053 COMPREHEN METABOLIC PANEL: CPT | Performed by: STUDENT IN AN ORGANIZED HEALTH CARE EDUCATION/TRAINING PROGRAM

## 2018-04-29 PROCEDURE — 85730 THROMBOPLASTIN TIME PARTIAL: CPT | Performed by: PHYSICIAN ASSISTANT

## 2018-04-29 PROCEDURE — 82947 ASSAY GLUCOSE BLOOD QUANT: CPT

## 2018-04-29 PROCEDURE — 94760 N-INVAS EAR/PLS OXIMETRY 1: CPT

## 2018-04-29 PROCEDURE — 85610 PROTHROMBIN TIME: CPT | Performed by: PHYSICIAN ASSISTANT

## 2018-04-29 PROCEDURE — 85610 PROTHROMBIN TIME: CPT | Performed by: INTERNAL MEDICINE

## 2018-04-29 PROCEDURE — 95951 PR EEG MONITORING/VIDEORECORD: CPT | Performed by: PSYCHIATRY & NEUROLOGY

## 2018-04-29 PROCEDURE — 83970 ASSAY OF PARATHORMONE: CPT | Performed by: STUDENT IN AN ORGANIZED HEALTH CARE EDUCATION/TRAINING PROGRAM

## 2018-04-29 PROCEDURE — 5A1945Z RESPIRATORY VENTILATION, 24-96 CONSECUTIVE HOURS: ICD-10-PCS | Performed by: INTERNAL MEDICINE

## 2018-04-29 PROCEDURE — 36556 INSERT NON-TUNNEL CV CATH: CPT | Performed by: INTERNAL MEDICINE

## 2018-04-29 PROCEDURE — 94002 VENT MGMT INPAT INIT DAY: CPT

## 2018-04-29 PROCEDURE — 99223 1ST HOSP IP/OBS HIGH 75: CPT | Performed by: INTERNAL MEDICINE

## 2018-04-29 PROCEDURE — 85730 THROMBOPLASTIN TIME PARTIAL: CPT | Performed by: INTERNAL MEDICINE

## 2018-04-29 PROCEDURE — 85014 HEMATOCRIT: CPT

## 2018-04-29 PROCEDURE — 85027 COMPLETE CBC AUTOMATED: CPT | Performed by: INTERNAL MEDICINE

## 2018-04-29 PROCEDURE — 31500 INSERT EMERGENCY AIRWAY: CPT | Performed by: INTERNAL MEDICINE

## 2018-04-29 PROCEDURE — 82805 BLOOD GASES W/O2 SATURATION: CPT | Performed by: PHYSICIAN ASSISTANT

## 2018-04-29 PROCEDURE — 70450 CT HEAD/BRAIN W/O DYE: CPT

## 2018-04-29 PROCEDURE — 84132 ASSAY OF SERUM POTASSIUM: CPT

## 2018-04-29 PROCEDURE — 0BH17EZ INSERTION OF ENDOTRACHEAL AIRWAY INTO TRACHEA, VIA NATURAL OR ARTIFICIAL OPENING: ICD-10-PCS | Performed by: INTERNAL MEDICINE

## 2018-04-29 PROCEDURE — 99291 CRITICAL CARE FIRST HOUR: CPT | Performed by: PSYCHIATRY & NEUROLOGY

## 2018-04-29 PROCEDURE — 83605 ASSAY OF LACTIC ACID: CPT | Performed by: INTERNAL MEDICINE

## 2018-04-29 PROCEDURE — 82803 BLOOD GASES ANY COMBINATION: CPT

## 2018-04-29 PROCEDURE — 02HV33Z INSERTION OF INFUSION DEVICE INTO SUPERIOR VENA CAVA, PERCUTANEOUS APPROACH: ICD-10-PCS | Performed by: EMERGENCY MEDICINE

## 2018-04-29 PROCEDURE — 36620 INSERTION CATHETER ARTERY: CPT

## 2018-04-29 PROCEDURE — 36600 WITHDRAWAL OF ARTERIAL BLOOD: CPT

## 2018-04-29 PROCEDURE — 99291 CRITICAL CARE FIRST HOUR: CPT | Performed by: INTERNAL MEDICINE

## 2018-04-29 PROCEDURE — 95951 HB EEG MONITORING/VIDEORECORD: CPT

## 2018-04-29 PROCEDURE — 82330 ASSAY OF CALCIUM: CPT | Performed by: INTERNAL MEDICINE

## 2018-04-29 PROCEDURE — 83605 ASSAY OF LACTIC ACID: CPT | Performed by: STUDENT IN AN ORGANIZED HEALTH CARE EDUCATION/TRAINING PROGRAM

## 2018-04-29 RX ORDER — LABETALOL HYDROCHLORIDE 5 MG/ML
10 INJECTION, SOLUTION INTRAVENOUS EVERY 6 HOURS PRN
Status: DISCONTINUED | OUTPATIENT
Start: 2018-04-29 | End: 2018-05-07 | Stop reason: HOSPADM

## 2018-04-29 RX ORDER — POTASSIUM CHLORIDE 14.9 MG/ML
20 INJECTION INTRAVENOUS ONCE
Status: COMPLETED | OUTPATIENT
Start: 2018-04-29 | End: 2018-04-29

## 2018-04-29 RX ORDER — SODIUM CHLORIDE, SODIUM GLUCONATE, SODIUM ACETATE, POTASSIUM CHLORIDE, MAGNESIUM CHLORIDE, SODIUM PHOSPHATE, DIBASIC, AND POTASSIUM PHOSPHATE .53; .5; .37; .037; .03; .012; .00082 G/100ML; G/100ML; G/100ML; G/100ML; G/100ML; G/100ML; G/100ML
1000 INJECTION, SOLUTION INTRAVENOUS ONCE
Status: COMPLETED | OUTPATIENT
Start: 2018-04-29 | End: 2018-04-29

## 2018-04-29 RX ORDER — MAGNESIUM SULFATE HEPTAHYDRATE 40 MG/ML
4 INJECTION, SOLUTION INTRAVENOUS ONCE
Status: COMPLETED | OUTPATIENT
Start: 2018-04-29 | End: 2018-04-29

## 2018-04-29 RX ORDER — DEXTROSE MONOHYDRATE 25 G/50ML
INJECTION, SOLUTION INTRAVENOUS
Status: COMPLETED
Start: 2018-04-29 | End: 2018-04-29

## 2018-04-29 RX ORDER — LORAZEPAM 2 MG/ML
INJECTION INTRAMUSCULAR
Status: COMPLETED
Start: 2018-04-29 | End: 2018-04-29

## 2018-04-29 RX ORDER — PROPOFOL 10 MG/ML
5-50 INJECTION, EMULSION INTRAVENOUS
Status: DISCONTINUED | OUTPATIENT
Start: 2018-04-29 | End: 2018-04-29

## 2018-04-29 RX ORDER — POTASSIUM CHLORIDE 20MEQ/15ML
20 LIQUID (ML) ORAL 2 TIMES DAILY
Status: DISCONTINUED | OUTPATIENT
Start: 2018-04-29 | End: 2018-04-29

## 2018-04-29 RX ORDER — METOPROLOL TARTRATE 50 MG/1
50 TABLET, FILM COATED ORAL EVERY 12 HOURS SCHEDULED
Status: DISCONTINUED | OUTPATIENT
Start: 2018-04-29 | End: 2018-05-01

## 2018-04-29 RX ORDER — HYDRALAZINE HYDROCHLORIDE 20 MG/ML
10 INJECTION INTRAMUSCULAR; INTRAVENOUS EVERY 6 HOURS PRN
Status: DISCONTINUED | OUTPATIENT
Start: 2018-04-29 | End: 2018-04-29

## 2018-04-29 RX ORDER — AMOXICILLIN 250 MG
1 CAPSULE ORAL
Status: DISCONTINUED | OUTPATIENT
Start: 2018-04-29 | End: 2018-05-07 | Stop reason: HOSPADM

## 2018-04-29 RX ORDER — SODIUM CHLORIDE, SODIUM GLUCONATE, SODIUM ACETATE, POTASSIUM CHLORIDE, MAGNESIUM CHLORIDE, SODIUM PHOSPHATE, DIBASIC, AND POTASSIUM PHOSPHATE .53; .5; .37; .037; .03; .012; .00082 G/100ML; G/100ML; G/100ML; G/100ML; G/100ML; G/100ML; G/100ML
100 INJECTION, SOLUTION INTRAVENOUS CONTINUOUS
Status: DISCONTINUED | OUTPATIENT
Start: 2018-04-29 | End: 2018-04-29

## 2018-04-29 RX ORDER — FUROSEMIDE 10 MG/ML
20 INJECTION INTRAMUSCULAR; INTRAVENOUS ONCE
Status: COMPLETED | OUTPATIENT
Start: 2018-04-29 | End: 2018-04-29

## 2018-04-29 RX ORDER — DEXTROSE MONOHYDRATE 25 G/50ML
25 INJECTION, SOLUTION INTRAVENOUS ONCE
Status: DISCONTINUED | OUTPATIENT
Start: 2018-04-29 | End: 2018-04-30

## 2018-04-29 RX ORDER — METOCLOPRAMIDE HYDROCHLORIDE 5 MG/ML
10 INJECTION INTRAMUSCULAR; INTRAVENOUS EVERY 6 HOURS PRN
Status: DISCONTINUED | OUTPATIENT
Start: 2018-04-29 | End: 2018-05-07 | Stop reason: HOSPADM

## 2018-04-29 RX ORDER — POTASSIUM CHLORIDE 20MEQ/15ML
40 LIQUID (ML) ORAL ONCE
Status: COMPLETED | OUTPATIENT
Start: 2018-04-29 | End: 2018-04-29

## 2018-04-29 RX ORDER — CHLORHEXIDINE GLUCONATE 0.12 MG/ML
15 RINSE ORAL EVERY 12 HOURS SCHEDULED
Status: DISCONTINUED | OUTPATIENT
Start: 2018-04-29 | End: 2018-04-30

## 2018-04-29 RX ORDER — LORAZEPAM 2 MG/ML
2 INJECTION INTRAMUSCULAR AS NEEDED
Status: DISCONTINUED | OUTPATIENT
Start: 2018-04-29 | End: 2018-05-07 | Stop reason: HOSPADM

## 2018-04-29 RX ORDER — NYSTATIN 100000 [USP'U]/G
POWDER TOPICAL 2 TIMES DAILY
Status: DISCONTINUED | OUTPATIENT
Start: 2018-04-29 | End: 2018-05-07 | Stop reason: HOSPADM

## 2018-04-29 RX ORDER — MAGNESIUM SULFATE HEPTAHYDRATE 40 MG/ML
2 INJECTION, SOLUTION INTRAVENOUS ONCE
Status: DISCONTINUED | OUTPATIENT
Start: 2018-04-29 | End: 2018-04-29

## 2018-04-29 RX ORDER — MAGNESIUM SULFATE HEPTAHYDRATE 40 MG/ML
2 INJECTION, SOLUTION INTRAVENOUS ONCE
Status: COMPLETED | OUTPATIENT
Start: 2018-04-29 | End: 2018-04-30

## 2018-04-29 RX ORDER — ACETAMINOPHEN 325 MG/1
650 TABLET ORAL EVERY 6 HOURS PRN
Status: DISCONTINUED | OUTPATIENT
Start: 2018-04-29 | End: 2018-05-07 | Stop reason: HOSPADM

## 2018-04-29 RX ADMIN — DOCUSATE SODIUM 100 MG: 100 CAPSULE, LIQUID FILLED ORAL at 08:19

## 2018-04-29 RX ADMIN — POLYETHYLENE GLYCOL 3350 17 G: 17 POWDER, FOR SOLUTION ORAL at 08:19

## 2018-04-29 RX ADMIN — INSULIN LISPRO 1 UNITS: 100 INJECTION, SOLUTION INTRAVENOUS; SUBCUTANEOUS at 06:24

## 2018-04-29 RX ADMIN — METOPROLOL TARTRATE 50 MG: 50 TABLET ORAL at 20:52

## 2018-04-29 RX ADMIN — FENTANYL CITRATE 50 MCG/HR: 50 INJECTION, SOLUTION INTRAMUSCULAR; INTRAVENOUS at 11:36

## 2018-04-29 RX ADMIN — FLUTICASONE PROPIONATE 1 PUFF: 110 AEROSOL, METERED RESPIRATORY (INHALATION) at 01:07

## 2018-04-29 RX ADMIN — SODIUM CHLORIDE, SODIUM GLUCONATE, SODIUM ACETATE, POTASSIUM CHLORIDE, MAGNESIUM CHLORIDE, SODIUM PHOSPHATE, DIBASIC, AND POTASSIUM PHOSPHATE 1000 ML: .53; .5; .37; .037; .03; .012; .00082 INJECTION, SOLUTION INTRAVENOUS at 12:37

## 2018-04-29 RX ADMIN — LORAZEPAM 2 MG: 2 INJECTION INTRAMUSCULAR; INTRAVENOUS at 10:27

## 2018-04-29 RX ADMIN — POTASSIUM CHLORIDE 20 MEQ: 200 INJECTION, SOLUTION INTRAVENOUS at 17:42

## 2018-04-29 RX ADMIN — FLUTICASONE PROPIONATE 1 PUFF: 110 AEROSOL, METERED RESPIRATORY (INHALATION) at 08:22

## 2018-04-29 RX ADMIN — INSULIN GLARGINE 30 UNITS: 100 INJECTION, SOLUTION SUBCUTANEOUS at 00:53

## 2018-04-29 RX ADMIN — HYDRALAZINE HYDROCHLORIDE 5 MG: 20 INJECTION INTRAMUSCULAR; INTRAVENOUS at 00:44

## 2018-04-29 RX ADMIN — ATORVASTATIN CALCIUM 80 MG: 80 TABLET, FILM COATED ORAL at 17:38

## 2018-04-29 RX ADMIN — LEVOTHYROXINE SODIUM 100 MCG: 100 TABLET ORAL at 06:24

## 2018-04-29 RX ADMIN — VITAMIN D, TAB 1000IU (100/BT) 1000 UNITS: 25 TAB at 08:19

## 2018-04-29 RX ADMIN — CHLORHEXIDINE GLUCONATE 15 ML: 1.2 RINSE ORAL at 20:51

## 2018-04-29 RX ADMIN — LORAZEPAM 2 MG: 2 INJECTION INTRAMUSCULAR; INTRAVENOUS at 10:24

## 2018-04-29 RX ADMIN — LEVETIRACETAM 2000 MG: 100 INJECTION, SOLUTION INTRAVENOUS at 11:59

## 2018-04-29 RX ADMIN — Medication 1000 MG: at 00:39

## 2018-04-29 RX ADMIN — NYSTATIN: 100000 POWDER TOPICAL at 15:17

## 2018-04-29 RX ADMIN — FLUTICASONE PROPIONATE 1 SPRAY: 50 SPRAY, METERED NASAL at 08:22

## 2018-04-29 RX ADMIN — METOPROLOL TARTRATE 100 MG: 100 TABLET ORAL at 00:40

## 2018-04-29 RX ADMIN — SODIUM CHLORIDE 1.5 UNITS/HR: 9 INJECTION, SOLUTION INTRAVENOUS at 15:13

## 2018-04-29 RX ADMIN — Medication 1000 MG: at 08:19

## 2018-04-29 RX ADMIN — METOPROLOL TARTRATE 100 MG: 100 TABLET ORAL at 08:19

## 2018-04-29 RX ADMIN — CALCIUM GLUCONATE 1 G: 98 INJECTION, SOLUTION INTRAVENOUS at 01:58

## 2018-04-29 RX ADMIN — INSULIN GLARGINE 30 UNITS: 100 INJECTION, SOLUTION SUBCUTANEOUS at 08:20

## 2018-04-29 RX ADMIN — LEVETIRACETAM 1000 MG: 100 INJECTION, SOLUTION INTRAVENOUS at 22:08

## 2018-04-29 RX ADMIN — LISINOPRIL 2.5 MG: 2.5 TABLET ORAL at 00:39

## 2018-04-29 RX ADMIN — POTASSIUM CHLORIDE 40 MEQ: 20 SOLUTION ORAL at 12:46

## 2018-04-29 RX ADMIN — Medication 1 TABLET: at 22:13

## 2018-04-29 RX ADMIN — LORATADINE 10 MG: 10 TABLET ORAL at 08:19

## 2018-04-29 RX ADMIN — PANTOPRAZOLE SODIUM 40 MG: 40 TABLET, DELAYED RELEASE ORAL at 08:19

## 2018-04-29 RX ADMIN — FUROSEMIDE 20 MG: 10 INJECTION, SOLUTION INTRAMUSCULAR; INTRAVENOUS at 02:05

## 2018-04-29 RX ADMIN — CALCIUM GLUCONATE 2 G: 98 INJECTION, SOLUTION INTRAVENOUS at 15:20

## 2018-04-29 RX ADMIN — HYDRALAZINE HYDROCHLORIDE 10 MG: 20 INJECTION INTRAMUSCULAR; INTRAVENOUS at 08:20

## 2018-04-29 RX ADMIN — RIVAROXABAN 20 MG: 20 TABLET, FILM COATED ORAL at 08:22

## 2018-04-29 RX ADMIN — POTASSIUM CHLORIDE 10 MEQ: 750 TABLET, EXTENDED RELEASE ORAL at 08:19

## 2018-04-29 RX ADMIN — METOCLOPRAMIDE 10 MG: 5 INJECTION, SOLUTION INTRAMUSCULAR; INTRAVENOUS at 01:10

## 2018-04-29 RX ADMIN — MAGNESIUM SULFATE IN WATER 4 G: 40 INJECTION, SOLUTION INTRAVENOUS at 17:42

## 2018-04-29 RX ADMIN — ASPIRIN 81 MG: 81 TABLET, COATED ORAL at 08:19

## 2018-04-29 RX ADMIN — SODIUM CHLORIDE, SODIUM GLUCONATE, SODIUM ACETATE, POTASSIUM CHLORIDE, MAGNESIUM CHLORIDE, SODIUM PHOSPHATE, DIBASIC, AND POTASSIUM PHOSPHATE 50 ML/HR: .53; .5; .37; .037; .03; .012; .00082 INJECTION, SOLUTION INTRAVENOUS at 03:30

## 2018-04-29 RX ADMIN — SODIUM CHLORIDE 50 ML/HR: 0.45 INJECTION, SOLUTION INTRAVENOUS at 00:19

## 2018-04-29 RX ADMIN — DEXTROSE MONOHYDRATE 25 ML: 25 INJECTION, SOLUTION INTRAVENOUS at 17:36

## 2018-04-29 NOTE — PHYSICIAN ADVISOR
Current patient class: Inpatient  The patient is currently on Hospital Day: 2      The patient was admitted to the hospital  on 4/29/18 at 1130 for the following diagnosis:  Vomiting [R11 10]  Altered mental status [R41 82]  Intractable vomiting [R11 10]       There is documentation in the medical record of an expected length of stay of at least 2 midnights  The patient is therefore expected to satisfy the 2 midnight benchmark and given the 2 midnight presumption is appropriate for INPATIENT ADMISSION  Given this expectation of a satisfying stay, CMS instructs us that the patient is most often appropriate for inpatient admission under part A provided medical necessity is documented in the chart  After review of the relevant documentation, labs, vital signs and test results, the patient is appropriate for INPATIENT ADMISSION  Admission to the hospital as an inpatient is a complex decision making process which requires the practitioner to consider the patients presenting complaint, history and physical examination and all relevant testing  With this in mind, in this case, the patient was deemed appropriate for INPATIENT ADMISSION  After review of the documentation and testing available at the time of the admission I concur with this clinical determination of medical necessity  The patient does have an inpatient admission within the previous 30 days  The patient was admitted on 4/28/18 and discharged on 4/28/18 as an inpatient  The patient therefore required readmission review  In this case the patient should be considered a SEPARATE and UNRELATED INPATIENT ADMISSION  The patient had been discharged in stable condition with a completed care plan  There were no unresolved acute medical issues at the time of discharged which would have reasonably been expected to prompt this readmission  Rationale is as follows:     The patient is a [de-identified] yrs   Female who presented to the ED at 4/28/2018  8:56 PM with a chief complaint of Vomiting (pt  co N/V and continued weakness)     The patient was also found to have a tonic-clonic seizure; the patient was seen by Neurology and transferred to the  ICU  The etiology of the seizure is not known CT scan of the head did not show any acute process  The patient was tsf to the ICU and given IV ativan and IV keppra  This patient is not yet stable  for discharge and needs continued hospitalization  This admission is UNRELATED to her prior admission for acute CVA as she was discharged in stable condition      The patients vitals on arrival were ED Triage Vitals [04/28/18 2059]   Temperature Pulse Respirations Blood Pressure SpO2   98 3 °F (36 8 °C) (!) 112 22 (!) 187/93 96 %      Temp Source Heart Rate Source Patient Position - Orthostatic VS BP Location FiO2 (%)   Oral Monitor Sitting Right arm --      Pain Score       No Pain           Past Medical History:   Diagnosis Date    Altered mental status     Anticoagulated     Coumadin for Aflutter    Asthma     Atrial flutter (HCC)     maintained on coumadin anticoag    CAD (coronary artery disease)     Candidiasis     Carotid stenosis, bilateral     Cataract     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[facility]     Chronic fatigue syndrome     Chronic low back pain     Concussion w loss of consciousness of unsp duration, init     Coronary artery disease     CVA (cerebral vascular accident) ([address] ) 4/17/2018    Diabetes mellitus ([address] )     type 2, insulin dependent    DJD (degenerative joint disease)     GERD (gastroesophageal reflux disease)     Herpes zoster     HLD (hyperlipidemia)     HTN (hypertension)     Hyperlipidemia     Hypothyroidism     Irritable bowel syndrome     Lumbar radiculopathy     Lyme disease     Dx in hospital 8/2011    Lyme disease     MI (myocardial infarction) ([address] )     Migraines     Muscle spasm     Non-neoplastic nevus     Nontoxic multinodular goiter     OA (osteoarthritis)     Osteoarthritis     Other chronic pain     PAD (peripheral artery disease) (HCC)     Palpitations     Pseudogout     Spinal stenosis     Transient cerebral ischemia     Trigger ring finger     Viral gastroenteritis      Past Surgical History:   Procedure Laterality Date    APPENDECTOMY      ARTERIOGRAM  12/19/2017    CARDIAC CATHETERIZATION      CHOLECYSTECTOMY      COLONOSCOPY      CORONARY ARTERY BYPASS GRAFT  2004    LUMBAR LAMINECTOMY      AL ECHO TRANSESOPHAG R-T 2D W/PRB IMG ACQUISJ I&R N/A 4/3/2018    Procedure: TRANSESOPHAGEAL ECHOCARDIOGRAM (ROBB);   Surgeon: Iliana Anderson DO;  [facility]: [facility];  Service: Cardiac Surgery    AL REPLACE AORTIC VALVE OPENFEMORAL ARTERY APPROACH N/A 4/3/2018    Procedure: REPLACEMENT AORTIC VALVE TRANSCATHETER (TAVR) TRANSFEMORAL w/ 26MM IVY YEVGENIY S3 VALVE;  Surgeon: Iliana Anderson DO;  Location: [facility];  Service: Cardiac Surgery    THYROIDECTOMY      TOTAL ABDOMINAL HYSTERECTOMY W/ BILATERAL SALPINGOOPHORECTOMY             Consults have been placed to:   IP CONSULT TO CASE MANAGEMENT  IP CONSULT TO NEUROLOGY  IP CONSULT TO NUTRITION SERVICES  IP CONSULT TO CASE MANAGEMENT  IP CONSULT TO NUTRITION SERVICES    Vitals:    04/29/18 1500 04/29/18 1600 04/29/18 1700 04/29/18 1800   BP:       BP Location:       Pulse: 70 67 69 69   Resp: 14 14 14 14   Temp:  99 °F (37 2 °C)     TempSrc:       SpO2: 99% 99% 100% 99%   Weight:       Height:           Most recent labs:    Recent Labs      04/28/18   0913   04/28/18   2140   04/29/18   0450   04/29/18   1028  04/29/18   1029  04/29/18   1538   WBC  9 30   --   9 60   --    --    --   15 93*   --    --    HGB  9 5*   < >  9 2*   --    --    < >  9 9*   --    --    HCT  30 7*   --   30 1*   --    --    --   33 4*   --    --    PLT  315   --   309   --    --    --   359   --    --    K  3 1*   --   3 6   --   3 5   --    --   3 7  3 5   NA  141   --   140   --   139   --    -- 141  143   CALCIUM  6 2*   --   6 0*   --   5 9*   --    --   6 3*  5 9*   BUN  9   --   11   --   12   --    --   14  15   CREATININE  0 70   --   0 72   --   0 82   --    --   1 13  0 73   LIPASE  139   --   112   --    --    --    --    --    --    INR  4 04*   --   4 21*   < >   --    --   10 50*   --   6 94*   TROPONINI  0 03   --    --    --    --    --    --    --    --    AST  33   --   28   --   30   --    --   43   --    ALT  19   --   19   --   19   --    --   21   --    ALKPHOS  74   --   72   --   70   --    --   73   --    BILITOT  0 67   --   0 83   --   0 81   --    --   0 83   --     < > = values in this interval not displayed         Scheduled Meds:  Current Facility-Administered Medications:  acetaminophen 650 mg Oral Q6H PRN Adeline Tyler, DO    albuterol 2 puff Inhalation Q6H PRN Kajal Vee MD    atorvastatin 80 mg Oral Daily With Dinner Kajal Vee MD    chlorhexidine 15 mL Swish & Spit Q12H [facility] Alejandro Estevez DO    cholecalciferol 1,000 Units Oral Daily Kajal Vee MD    dextrose 25 mL Intravenous Once Ty MAGDALENO Mejia    fentaNYL 50 mcg/hr Intravenous Continuous Alejandro Estevez DO Last Rate: 50 mcg/hr (04/29/18 1136)   fluticasone 1 puff Inhalation Q12H [facility] Vilma Bolaños MD    insulin regular (HumuLIN R,NovoLIN R) infusion 0 3-21 Units/hr Intravenous Titrated Alejandro Estevez DO Last Rate: Stopped (04/29/18 1538)   labetalol 10 mg Intravenous Q6H PRN Alejandro Estevez DO    levETIRAcetam 1,000 mg Intravenous Q12H [facility] Flaco BROOKE Estevez,     levothyroxine 100 mcg Oral Daily Vilma Bolaños MD    loratadine 10 mg Oral Daily Vilma Bolaños MD    LORazepam 2 mg Intravenous PRN Alejandro Estevez, DO    metoclopramide 10 mg Intravenous Q6H PRN Kajal Vee MD    metoprolol tartrate 50 mg Oral Q12H [facility] Norm Nash MD    nystatin  Topical BID Alejandro Estevez, DO    omeprazole (PRILOSEC) suspension 2 mg/mL 20 mg Oral Daily Flaco Estevez, DO    senna-docusate sodium 1 tablet Oral HS Francisca Danielle PA-C      Continuous Infusions:  fentaNYL 50 mcg/hr Last Rate: 50 mcg/hr (04/29/18 1136)   insulin regular (HumuLIN R,NovoLIN R) infusion 0 3-21 Units/hr Last Rate: Stopped (04/29/18 1538)     PRN Meds:   acetaminophen    albuterol    labetalol    LORazepam    metoclopramide

## 2018-04-29 NOTE — CASE MANAGEMENT
Initial Clinical Review    Admission: Date/Time/Statement: 04/28/18 @ 2238 -- OBS --updated to INPATIENT 4/29 @ 1130 d/t upgraded level of care    Start   Ordered   04/29/18 1130  Inpatient Admission Once     Transfer Service: Critical Care/ICU       Question Answer Comment   Admitting Physician Anita Moore    Level of Care Critical Care    Estimated length of stay More than 2 Midnights    Certification I certify that inpatient services are medically necessary for this patient for a duration of greater than two midnights  See H&P and MD Progress Notes for additional information about the patient's course of treatment  04/29/18 1130       Orders Placed This Encounter   Procedures    Place in Observation     Standing Status:   Standing     Number of Occurrences:   1     Order Specific Question:   Admitting Physician     Answer:   Abbi Campbell [[id number]]     Order Specific Question:   Level of Care     Answer:   Med Surg [16]     Order Specific Question:   Bed Type     Answer:   [place] [4]       ED: Date/Time/Mode of Arrival:   ED Arrival Information     Expected Arrival Acuity Means of Arrival Escorted By Service Admission Type    - 4/28/2018 20:56 [facility] General Medicine Emergency    Arrival Complaint    weakness          Chief Complaint:   Chief Complaint   Patient presents with    Vomiting     pt  co N/V and continued weakness       History of Illness: 78-year-old female with a past medical history significant for CAD, status post TAVR, hypertension, hyperlipidemia, diabetes, h/o recent CVA started on Xarelto, who presents with weakness, nausea, abdominal pain  Yesterday was feeling generally unwell with some fatigue and malaise  Reports that at around 3:00 a m  today started to feel nauseous with some generalized nonspecific abdominal pain that she is having difficulty describing to me  Overall, the patient mostly complains of feeling "weak"    Denies chest pain, shortness of breath, palpitations, lower extremity edema, diarrhea, constipation, blood in stools, dysuria, hematuria, fevers, chills, headache, neck pain/ stiffness       ED Vital Signs:   ED Triage Vitals [04/28/18 2059]   Temperature Pulse Respirations Blood Pressure SpO2   98 3 °F (36 8 °C) (!) 112 22 (!) 187/93 96 %      Temp Source Heart Rate Source Patient Position - Orthostatic VS BP Location FiO2 (%)   Oral Monitor Sitting Right arm --      Pain Score       No Pain        Wt Readings from Last 1 Encounters:   04/28/18 73 5 kg (162 lb)       Vital Signs:  04/28 0701  04/29 0700 04/29 0701  04/29 0915  Most Recent     Temperature (°F) 98 298 8 98 8  98 8 (37 1)    Pulse 83112 70  70    Respirations 1435 24  24    Blood Pressure 129/72204/86 182/89  182/89    SpO2 (%) 8898 97  97        Abnormal Labs/Diagnostic Test Results:     CBC and differential    Ref Range & Units 4/28/18 2140 Flag   WBC 4 31 - 10 16 Thousand/uL 9 60     RBC 3 81 - 5 12 Million/uL 4 40     Hemoglobin 11 5 - 15 4 g/dL 9 2   L    Hematocrit 34 8 - 46 1 % 30 1   L    MCV 82 - 98 fL 68   L    MCH 26 8 - 34 3 pg 20 9   L    MCHC 31 4 - 37 4 g/dL 30 6   L    RDW 11 6 - 15 1 % 18 7   H             Ref Range & Units 4/28/18 2140 Flag   Sodium 136 - 145 mmol/L 140     Potassium 3 5 - 5 3 mmol/L 3 6     Chloride 100 - 108 mmol/L 107     CO2 21 - 32 mmol/L 21     Anion Gap 4 - 13 mmol/L 12     BUN 5 - 25 mg/dL 11     Creatinine 0 60 - 1 30 mg/dL 0 72     Glucose 65 - 140 mg/dL 122     Calcium 8 3 - 10 1 mg/dL 6 0   L    AST 5 - 45 U/L 28     ALT 12 - 78 U/L 19     Alkaline Phosphatase 46 - 116 U/L 72     Total Protein 6 4 - 8 2 g/dL 7 4     Albumin 3 5 - 5 0 g/dL 3 0   L    Total Bilirubin 0 20 - 1 00 mg/dL 0 83     eGFR ml/min/1 73sq m 79         Blood gas, arterial    Ref Range & Units 4/29/18 0111 Flag   pH, Arterial 7 350 - 7 450 7 376     pCO2, Arterial 36 0 - 44 0 mm Hg 28 4   LL    pO2, Arterial 75 0 - 129 0 mm Hg 91 9     HCO3, Arterial 22 0 - 28 0 mmol/L 16 3   L    Base Excess, Arterial mmol/L -7 8     O2 Content, Arterial 16 0 - 23 0 mL/dL 13 9   L    O2 HGB,Arterial  94 0 - 97 0 % 95 8     SOURCE  Radial, Right     ZAY TEST  Yes     Nasal Cannula  4             Lactic acid, plasma    Ref Range & Units 4/29/18 0617 Flag   LACTIC ACID 0 5 - 2 0 mmol/L 3 1   [facility]                ED Treatment:   Medication Administration from 04/28/2018 2056 to 04/28/2018 2324       Date/Time Order Dose Route Action Action by Comments     04/28/2018 2127 metoclopramide (REGLAN) injection 10 mg 10 mg Intravenous Given Darling Suarez RN      04/28/2018 2206 sodium chloride 0 9 % bolus 1,000 mL 1,000 mL Intravenous New Bag Darling Suarez RN           Past Medical/Surgical History:    Active Ambulatory Problems     Diagnosis Date Noted    Essential hypertension 12/19/2017    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 native heart with angina pectoris ([facility] ) 12/19/2017    Type 2 diabetes mellitus with complication, with long-term current use of insulin ([facility] ) 12/19/2017    Atrial flutter ([facility] ) 12/19/2017    Hyperlipidemia 12/19/2017    Gastroesophageal reflux disease without esophagitis 12/19/2017    Moderate mitral stenosis 12/19/2017    Arthritis of hand 12/26/2014    Acute respiratory failure with hypoxia ([address] ) 03/16/2018    Asthma 03/16/2018    Asymptomatic carotid artery stenosis, bilateral 11/16/2017    Atherosclerosis of artery of extremity with ulceration ([facility] ) 06/09/2017    Hx of CABG 01/15/2018    Chronic anticoagulation 08/22/2016    Chronic diastolic congestive heart failure ([address] ) 01/15/2018    Chronic systolic heart failure ([address] ) 07/20/2016    Degenerative joint disease involving multiple joints 07/28/2017    Diabetic retinopathy ([address] ) 08/02/2016    Postoperative hypothyroidism 10/16/2013    Migraine headache 10/18/2017    Nephrolithiasis 12/17/2015    Osteoarthritis of both knees 11/23/2015    Paroxysmal atrial fibrillation ([place] [address] ) 01/15/2018    Ulcer of toe of left foot ([place] [address] ) 10/23/2017    Ulcer of toe of right foot ([place] [address] ) 03/26/2018    Prolonged Q-T interval on ECG 03/28/2018    Left bundle branch block (LBBB) 04/03/2018    1st degree AV block 04/03/2018    S/P TAVR (transcatheter aortic valve replacement) 04/06/2018    Expressive aphasia 04/16/2018    Multiple lacunar infarcts ([place] [address] ) 04/24/2018     Resolved Ambulatory Problems     Diagnosis Date Noted    NSTEMI (non-ST elevated myocardial infarction) ([place] [address] ) 12/19/2017    Critical aortic valve stenosis 12/19/2017    LEONARD (acute kidney injury) ([place] [address] ) 12/19/2017    Acute combined systolic and diastolic congestive heart failure ([place] [address] ) 07/05/2016    URI, acute 03/12/2018    Acute cardiogenic pulmonary edema ([place] [address] ) 03/15/2018    Atrial fibrillation with rapid ventricular response (HCC)     Acute on chronic diastolic CHF (congestive heart failure) ([place] [address] ) 03/28/2018   [address] [facility] discharge follow-up 03/28/2018    Hypokalemia 04/03/2018    Hypocalcemia 04/03/2018    Acute respiratory insufficiency, postoperative 04/03/2018    CVA (cerebral vascular accident) ([place] [address] ) 04/17/2018     Past Medical History:   Diagnosis Date    Altered mental status     Anticoagulated     Asthma     Atrial flutter (HCC)     CAD (coronary artery disease)     Candidiasis     Carotid stenosis, bilateral     Cataract     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) (HCC)     Chronic fatigue syndrome     Chronic low back pain     Concussion w loss of consciousness of unsp duration, init     Coronary artery disease     CVA (cerebral vascular accident) ([place] [address] ) 4/17/2018    Diabetes mellitus ([place] [address] )     DJD (degenerative joint disease)     GERD (gastroesophageal reflux disease)     Herpes zoster     HLD (hyperlipidemia)     HTN (hypertension)     Hyperlipidemia     Hypothyroidism     Irritable bowel syndrome     Lumbar radiculopathy     Lyme disease     Lyme disease     [facility] infarction) [facility]     Migraines     Muscle spasm     Non-neoplastic nevus     Nontoxic multinodular goiter     OA (osteoarthritis)     Osteoarthritis     Other chronic pain     PAD [facility]peripheral artery [facility]) [facility]     Palpitations     Pseudogout     Spinal stenosis     Transient cerebral ischemia     Trigger ring finger     Viral gastroenteritis        Admitting Diagnosis: Vomiting [R11 10]  Altered mental status [R41 82]  Intractable vomiting [R11 10]    Age/Sex: [de-identified] y o  female    Assessment/Plan:   [de-identified] y o  female with a history of insulin-dependant diabetes, a hypothyroidism, PAD, asthma paroxsymal afib, CAD s/p CABG (2005), aortic stenosis s/p TAVR (4/3/18), recent cardioembolic CVA (3/34/01) with transition from Coumadin to Xarelto presenting from home with several days of AMS, weakness now with vomiting and loose stools this AM       AMS: Per daughter prior to stroke she was completely independent in ADLs prior to stoke, discharged home to [facility] care after stroke with some improvement in speech with now with new slowness/word-finding difficulties  CTH neg, UA clean  Consideration generalized viral illness v  hypocalcemia v  recurrent CVA, further puctate emboli contributing to AMS  ? Currently AAOx3 though slow to respond  No gross focal neurological deficits   ? Fall/delerium precautions   ? Consideration for repeat MRI, will defer to day team   ? Further workup as below   ? Consideration for PT/OT eval      Nausea/vomiting/diarrhea: Consideration viral gastroenteritis, flu  No evidence of bacterial infection at this time  Consideration component chronic mesenteric ischemia  Abdominal exam benign  ? PRN Zofran  ? Trial clear liquid diet, ADAT  ? Follow-up flu panel      Acute hypoxic respiratory insufficiency: On initial eval in ED satting well on RA, no obvious pulmonary edema on ED CXR  Bolused 1L NS in ED  ?  Desatting to mid-80s on RA, requiring 5L NC  Bilateral basilar crackles on exam, tachypnea  ? Titrate O2 >88%  ? Suspect iatrogenic overload lasix 20 IV x1   ? Follow-up repeat XR       Hx CHF s/p aortic stenosis s/p TAVR: TAVR 4/3/18  ? Not discharged on lasix prior admission but 40 BID on home med rec  ? Will hold scheduled lasix for now defer to day team      Hypocalcemia: Corrected 6 8  Appears to be significantly decreased from prior  Consideration that contributing to AMS  ? IV calcium gluconate   ? Follow-up PTH, AM calcium      Elevated INR: INR 4 on admission, no evidence of bleeding/liver failure  Consideration that Xarelto associated with the increased INR  ? Continue to monitor       Generalized weakness: Workup as above  ? Consideration for PT/OT defer to day team      HTN:   ? SBP trending 180s on admission  ? Continue home metoprolol 100 BID, lisinopril  2 5 QD  ? Hydralazine PRN      Hypothyroidism: Normal TSH on admission   ? Continue home levothyroxine      Diabetes Mellitus: HbA1c 7 7 4/18  At home on Lantus 30u subQ BID  ? Per family no reports of low sugars  ? Continue Lantus 30u BID, ISS/POCT            CAD s/p CAGB  ?  Continue home aspirin, statin, metoprolol, lisinopril        Asthma:   ? Continue home home Asmanex     PAF:  ? Continue metoprolol 100 BID      PAD:   ? Consideration wound care consult      Code Status: Level 1 - Full Code  VTE Pharmacologic Prophylaxis: Reason for no pharmacologic prophylaxis Anti-coagulated   VTE Mechanical Prophylaxis: sequential compression device  Admission Status: OBSERVATION       Admission Orders:  M/S/Tele unit  Telem  O2 keep sats >90% -- 5 lpm nc  Lactic acid q2h  Clear liquid diet  Up with assistance  SCD's  Daily weights  Monitor I&O's    Scheduled Meds:   Current Facility-Administered Medications:  acetaminophen 650 mg Oral Q6H PRN Angeles Tyler, DO    albuterol 2 puff Inhalation Q6H PRN Jose Bell MD    aspirin 81 mg Oral Daily Jose Bell MD    atorvastatin 80 mg Oral Daily With Zahra Srivastava MD    calcium carbonate 1,000 mg Oral TID Katlyn Gallagher MD    cholecalciferol 1,000 Units Oral Daily Vilma Bolaños MD    docusate sodium 100 mg Oral BID Vilma Bolaños MD    fluticasone 1 spray Nasal Daily Vilma Bolaños MD    fluticasone 1 puff Inhalation Q12H [address] Vilma Bolaños MD    hydrALAZINE 10 mg Intravenous Q6H PRN Kaylee Tyler DO    insulin glargine 30 Units Subcutaneous Q12H [address] Vilma Bolaños MD    insulin lispro 1-5 Units Subcutaneous TID AC Vilma Bolaños MD    insulin lispro 1-5 Units Subcutaneous HS Vilma Bolaños MD    levothyroxine 100 mcg Oral Daily Vilma Bolaños MD    lisinopril 2 5 mg Oral HS Vilma Bolaños MD    loratadine 10 mg Oral Daily Vilma Bolaños MD    metoclopramide 10 mg Intravenous Q6H PRN Vilma Bolaños MD    metoprolol tartrate 100 mg Oral Q12H [address] Vilma Bolaños MD    multi-electrolyte 100 mL/hr Intravenous Continuous Richard Showman Last Rate: 100 mL/hr (04/29/18 0820)   pantoprazole 40 mg Oral Daily Vilma Bolaños MD    polyethylene glycol 17 g Oral Daily Vilma Bolaños MD    potassium chloride 10 mEq Oral BID Katlyn Gallagher MD    rivaroxaban 20 mg Oral Daily Vilma Bolaños MD      Continuous Infusions:   multi-electrolyte 100 mL/hr Last Rate: 100 mL/hr (04/29/18 0820)     PRN Meds:   acetaminophen    albuterol    hydrALAZINE    metoclopramide    4/29 --- pt became unresponsive during morning rounds -- Rapid Resonse called  Tx'd to ICU

## 2018-04-29 NOTE — PROGRESS NOTES
Progress Note - Rapid Response & Critical Care Accept Note  Judi Risen [de-identified] y o  female MRN: [id number]    Time Called ( Time): 10:06  Date Called: 4/29/18  Level of Care: MS  Room#: 480 -1  Arrival Time ( Time): 10:07   Event End Time ( Time): 10:42  MEWS score at time of Rapid Response:   Primary reason for call: Acute change in mental status  Interventions:  Airway/Breathing:  Suctioned, Intubated, Bag Mask and O2 Mask/Nasal  Circulation: N/A  Other Treatments: Ativan 2mg IV x 2       Assessment:   1  Generalized seizure  2  Altered mental status  3  Recent stroke, suspected cardioembolic  4  Hypocalcemia  5  Paroxysmal atrial flutter  6  Insulin-dependent diabetes mellitus  7  Carotid artery disease  8  Hypothyroidism  9  Peripheral artery disease  10  Aortic stenosis s/p TAVR  11  CAD s/p CABG (2005)  12  Hypertension    Plan:    Neuro:    -Generalized seizure, unclear source, possibly sequelae of previous stroke  Received Ativan 2mg IV x 2    -EMU in place    -Neurology following    -CT head was non-acute    -Consider MRI, defer to neuro    -Fentanyl drip for sedation  Switch from propofol so as to not suppress further seizure activity while on EMU    -Hx of previous stroke, suspected cardioembolic, on Xarelto  See heme below    -S/P ETT for airway protection    -Keppra load with 2g, then 1g Q 12 H thereafter    CV:    -Paroxysmal atrial flutter, recently transitioned from Coumadin to Xarelto for suspected Coumadin failure    -INR on admission was still elevated, however, at 4  Unclear if perhaps the patient was inadvertently continuing to take Coumadin at home as well  INR on admission to the ICU was 10, but suspected erroneous and will be repeated   She received her Xarelto already this AM  Will address anticoagulation    -Hx of CAD, s/p CABG in 2005    -Aspirin 81mg received this AM, on hold for now, will re-address    -Continue statin    -On Lasix 40mg at home, on hold    -On Lopressor 100mg po daily and lisinopril 5mg daily at home, on hold  Will monitor BP via a-line and cover with prn labetalol to keep SBP <180      Pulm:    -Intubated to protect airway, on AC/VC 16/400/50%/5    -CXR post intubation ordered    -Initial ABG during rapid showed a metabolic acidosis: 4 61/87/135/92  Repeat ordered      GI:    -OG in place  Will start tube feeds    -Initially presented with N/V, possible side effect of Xarelto vs viral illness    -Will change bowel regimen to Senokot so can be crushed and given via OG    :    -Mccormick inserted    -BUN/Cr 12/0 82    -I/O    -Daily weight    -No acute issues      FEN:    -Isolyte 100/hour    -Na 141, K 3 7, Cl 106, Ca 6 3 (repleted)    -Will start Jevity and consult nutrition    ID:     -No concerns at this time    -WBC 15 now from 9 this morning -- likely reactive    -Afebrile    -Initially was a flu rule out, will d/c droplet precautions    Heme:    -Hgb 9 9 from 9 2, stable    -Platelets 017,587    -Got Xarelto this AM, will re-address anticoagulation plan    -Address aspirin plan; on 81mg daily at home    -SCDs    MS:    -Reposition to prevent ulcer    Dispo: ICU       HPI/Chief Complaint (Background/Situation):   Martha Li is a [de-identified]y o  year old female who was admitted on 4/28 for nausea, vomiting, and increased lethargy at home for the previous few days  She was admitted on 4/16 for CVA (symptomatic with aphasia) which was suspected to be cardioembolic source  She was on Coumadin at the time of that stroke, and so she was transitioned to Xarelto out of concern for Coumadin failure  Per the H&P, her daughter stated that she was doing relatively well at home since her discharge, until the few days leading up to this admission  She was able to walk much less, more fatigued, and then developed N/V on the day of admission  She was seen in the ED that morning, had a CT abd/pelvis that was unremarkable, and was eventually discharged home   She had recurrent N/V at home after that and returned to the ED that evening and was admitted  CT head on admission was unremarkable  Ammonia and lipase were normal  Calcium was noted to be low -- ical was 0 69  She received 1g of calcium gluconate overnight  Lactic acid was also initially elevated a 3 8, but has cleared to 1 8  TSH 1 9, PTH low at 11  UA in the ED was not suggestive of infection  This morning, the patient was noted to have tonic-clonic activity and decreased responsiveness, and so a rapid response was called  At the time of the rapid response team's arrival, the patient was not responsive to verbal stimulation  Not speaking  Eyes open, but not purposeful eye contact  No tonic-clonic activity noted, but she did have a "lip-smacking" motion possibly suggestive of seizure  She received 2 doses of Ativan 2mg each  BP was elevated around 751 systolic  iStat revealed a metabolic acidosis with pH of 7 17, CO2 36, and bicarb 13  Base deficit 14  Anion gap of 18  BG was 238  She was initially placed on a NRBM as an O2 sat could not be obtained but was then found to be normal, and she tolerated NC as far as an oxygenation standpoint but because of her altered mental status and inability to protect her airway, she was intubated with etomidate  She was taken to CT scan (without contrast only; has a contrast dye allergy)  No bleed  Extensive microangiopathic changes only  She was then brought to the ICU in stable condition and initiated on Fentanyl for sedation (changed from propofol so as not to suppress seizure activity while on EMU)      Historical Information   Past Medical History:   Diagnosis Date    Altered mental status     Anticoagulated     Coumadin for Aflutter    Asthma     Atrial flutter ([facility] [address] )     maintained on coumadin anticoag    CAD (coronary artery disease)     Candidiasis     Carotid stenosis, bilateral     Cataract     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) ([facility])     Chronic fatigue syndrome     Chronic low back pain     Concussion w loss of consciousness of unsp duration, init     Coronary artery disease     CVA (cerebral vascular accident) ([facility] [address] ) 4/17/2018    Diabetes mellitus ([facility] )     type 2, insulin dependent    DJD (degenerative joint disease)     GERD (gastroesophageal reflux disease)     Herpes zoster     HLD (hyperlipidemia)     HTN (hypertension)     Hyperlipidemia     Hypothyroidism     Irritable bowel syndrome     Lumbar radiculopathy     Lyme disease     Dx in hospital 8/2011    Lyme disease     MI (myocardial infarction) ([facility] [address] )     Migraines     Muscle spasm     Non-neoplastic nevus     Nontoxic multinodular goiter     OA (osteoarthritis)     Osteoarthritis     Other chronic pain     PAD (peripheral artery disease) ([facility])     Palpitations     Pseudogout     Spinal stenosis     Transient cerebral ischemia     Trigger ring finger     Viral gastroenteritis      Past Surgical History:   Procedure Laterality Date    APPENDECTOMY      ARTERIOGRAM  12/19/2017    CARDIAC CATHETERIZATION      CHOLECYSTECTOMY      COLONOSCOPY      CORONARY ARTERY BYPASS GRAFT  2004    LUMBAR LAMINECTOMY      ME ECHO TRANSESOPHAG R-T 2D W/PRB IMG ACQUISJ I&R N/A 4/3/2018    Procedure: TRANSESOPHAGEAL ECHOCARDIOGRAM (ROBB);   Surgeon: Vijaya Talley DO;  [facility];  Service: Cardiac Surgery    ME REPLACE AORTIC VALVE OPENFEMORAL ARTERY APPROACH N/A 4/3/2018    Procedure: REPLACEMENT AORTIC VALVE TRANSCATHETER (TAVR) TRANSFEMORAL w/ 26MM IVY YEVGENIY S3 VALVE;  Surgeon: Vijaya Talley DO;  [facility];  Service: Cardiac Surgery    THYROIDECTOMY      TOTAL ABDOMINAL HYSTERECTOMY W/ BILATERAL SALPINGOOPHORECTOMY       Social History   History   Alcohol Use No     History   Drug Use No     History   Smoking Status    Never Smoker   Smokeless Tobacco    Never Used     Family History:   Family History   Problem Relation Age of Onset    Cancer Mother     Stroke Mother     Cancer Father     Heart disease Father     Breast cancer Sister     Diabetes Daughter      Passed away January 2017        Meds/Allergies     Current Facility-Administered Medications:  acetaminophen 650 mg Oral Q6H PRN Robert Grayson Veres, DO    albuterol 2 puff Inhalation Q6H PRN Isidra Henry MD    aspirin 81 mg Oral Daily Vilma Bolaños MD    atorvastatin 80 mg Oral Daily With Dinner Isidra Henry MD    calcium carbonate 1,000 mg Oral TID Isidra Henry MD    cholecalciferol 1,000 Units Oral Daily Vilma Bolaños MD    docusate sodium 100 mg Oral BID Vilma Bolaños MD    fluticasone 1 spray Nasal Daily Vilma Bolaños MD    fluticasone 1 puff Inhalation Q12H [address] Vilma Bolaños MD    hydrALAZINE 10 mg Intravenous Q6H PRN Robert Tyler, DO    insulin glargine 30 Units Subcutaneous Q12H [address] Vilma Bolaños MD    insulin lispro 1-5 Units Subcutaneous TID AC Vilma Bolaños MD    insulin lispro 1-5 Units Subcutaneous HS Vilma Bolaños MD    levETIRAcetam 1,000 mg Intravenous Once Gorge Semen, DO    levETIRAcetam 750 mg Intravenous Q12H [address] Reji Perez, DO    levothyroxine 100 mcg Oral Daily Vilma Bolaños MD    lisinopril 2 5 mg Oral HS Vilma Bolaños MD    loratadine 10 mg Oral Daily Vilma Bolaños MD    metoclopramide 10 mg Intravenous Q6H PRN Vilma Bolaños MD    metoprolol tartrate 100 mg Oral Q12H [address] Vilma Bolaños MD    multi-electrolyte 100 mL/hr Intravenous Continuous Harlon Venessa Last Rate: 100 mL/hr (04/29/18 0820)   pantoprazole 40 mg Oral Daily Vilma Bolaños MD    polyethylene glycol 17 g Oral Daily Vilma Bolaños MD    potassium chloride 10 mEq Oral BID Isidra Henry MD    propofol 5-50 mcg/kg/min Intravenous Titrated Gorge Semen, DO    rivaroxaban 20 mg Oral Daily Vilma Bolaños MD          multi-electrolyte 100 mL/hr Last Rate: 100 mL/hr (04/29/18 0306)   propofol 5-50 mcg/kg/min        Allergies   Allergen Reactions    Other Chest Pain IVP-listed as "chest pain" in previous chart, patient stated this occurred "a long time ago" but does not remember exactly occurred     Cephalosporins Chest Pain    Contrast [Iodinated Diagnostic Agents] Other (See Comments)     Flash pulm edema    Doxycycline Chest Pain    Levaquin [Levofloxacin] Chest Pain    Ondansetron Chest Pain     Prolonged QT    Toradol [Ketorolac Tromethamine] Chest Pain       ROS: Cannot obtain d/t altered MS    Physical Exam:  Gen: Eyes open, but unresponsive to voice, withdraws to pain  No respiratory distress  HEENT: PERRL, NCAT, unable to manage oral secretions  Chest: Equal chest expansion, CTAB, no distress  Cor: RRR  Abd:soft  Ext: no edema  Multiple dry ulcerations of the bilateral feet  Neuro: GCS 9 (E 4, V 1, M 4)  Skin: war, amd dry      Intake/Output Summary (Last 24 hours) at 04/29/18 1108  Last data filed at 04/29/18 0100   Gross per 24 hour   Intake                0 ml   Output               50 ml   Net              -50 ml       Respiratory    Lab Data (Last 4 hours)    None         O2/Vent Data (Last 4 hours)    None              Invasive Devices     Peripheral Intravenous Line            Peripheral IV 04/28/18 Right Antecubital less than 1 day    Peripheral IV 04/29/18 Right Arm less than 1 day          Airway            ETT  Hi-Lo; Cuffed 7 5 mm less than 1 day                DIAGNOSTIC DATA:    Lab: I have personally reviewed pertinent lab results     CBC:     Results from last 7 days  Lab Units 04/29/18  1025 04/28/18  2140   WBC Thousand/uL  --  9 60   HEMOGLOBIN g/dL  --  9 2*   I STAT HEMOGLOBIN g/dl 10 5*  --    HEMATOCRIT %  --  30 1*   PLATELETS Thousands/uL  --  309     CMP:     Results from last 7 days  Lab Units 04/29/18  1025 04/29/18  0450 04/28/18  2140  04/28/18  0913   SODIUM mmol/L  --  139 140  --  141   POTASSIUM mmol/L  --  3 5 3 6  --  3 1*   CHLORIDE mmol/L  --  108 107  --  107   CO2 mmol/L  --  15* 21  --  21   BUN mg/dL  --  12 11  --  9 CREATININE mg/dL  --  0 82 0 72  --  0 70   CALCIUM mg/dL  --  5 9* 6 0*  --  6 2*   TOTAL PROTEIN g/dL  --  7 3 7 4  --  7 6   BILIRUBIN TOTAL mg/dL  --  0 81 0 83  --  0 67   ALK PHOS U/L  --  70 72  --  74   ALT U/L  --  19 19  --  19   AST U/L  --  30 28  --  33   GLUCOSE RANDOM mg/dL  --  132 122  --  107   GLUCOSE, ISTAT mg/dl 220*  --   --   < >  --    < > = values in this interval not displayed  PT/INR:   Lab Results   Component Value Date    INR 3 75 (H) 04/29/2018   ,   Magnesium: No results found for: MAG,   Phosphorous: No results found for: PHOS    Microbiology:  No results found for: Joan Roblero SPUTUMCULTSAPPHIRE      OUTCOME:   Transferred to Critical Care Unit  Family notified of transfer: yes (left message)  Family member contacted: Wm Sierra  Code Status: Level 1 - Full Code  Critical Care Time: Total Critical Care time spent 45 minutes excluding procedures, teaching and family updates

## 2018-04-29 NOTE — CONSULTS
Consultation - Neurology   Carline Del Castillo [de-identified] y o  female MRN: [id number]  Unit/Bed#: ICU 03 Encounter: [id number]      Assessment/Plan   Assessment:  78-year-old right-handed female who admitted to Medicine for altered mental status was being worked up  She was found to have CHF exacerbation  Had a generalized tonic-clonic seizure during rounds, unclear etiology at this point (rule out stroke versus any other structural abnormalities)  She had postictal confusion was treated with 4 mg of Ativan total and 1 g of Keppra load  She received a CT head in the ED which I personally reviewed which was negative for any hemorrhage, does have significant microvascular disease  Plan:  -recommend EEG monitoring for at least 24-48 hours since she is now intubated, spoke to Dr Inocencio Alvarez  -continue with Keppra 750 IV b i d   -seizure precautions  -Ativan p r n   -she can get MRI brain with and without contrast seizure protocol after EEG monitoring  -continue with Xarelto for stroke prevention  Spoke to Dr Hazel Allison and the primary team regarding the recommendations in is in agreement with the plan  History of Present Illness     Reason for Consult / Principal Problem:  Possible seizure-like activity  Hx and PE limited by:  Patient currently lethargic  HPI: Carline Del Castillo is a [de-identified] y o  right handed female who presents with altered mental status last night  This morning when primary team was evaluating the patient and making rounds, mid sentence she started having generalized tonic-clonic seizure like activity of her upper and lower extremities and her eyes rolled back and became nonresponsive  Rapid response team was there and evaluated the patient  While I was there she was noted to have lip-smacking movements, rhythmic movements of her left upper extremity as well  She did have some rolling eye movements  No prior history of seizure known    She had a recent tavr procedure and shortly after, was diagnosed with possible TIA with a cardioembolic etiology and was started on Xarelto  Patient received her dose of Xarelto today  She received 2 mg of Ativan and after 15-20 minutes she was still not responsive and arousable  Her blood pressure during this time was 210/97  She was also given another 2 mg of Ativan and then loaded with 1000 mg of Keppra and started on IV 750mg maintenance dose  She was then intubated for airway protection  CT head was done in the ER which did not reveal any evidence of any hemorrhage      Consults    Review of Systems    Historical Information   Past Medical History:   Diagnosis Date    Altered mental status     Anticoagulated     Coumadin for Aflutter    Asthma     Atrial flutter (HCC)     maintained on coumadin anticoag    CAD (coronary artery disease)     Candidiasis     Carotid stenosis, bilateral     Cataract     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) ([facility])     Chronic fatigue syndrome     Chronic low back pain     Concussion w loss of consciousness of unsp duration, init     Coronary artery disease     CVA (cerebral vascular accident) ([facility] [address] ) 4/17/2018    Diabetes mellitus ([facility] [address] )     type 2, insulin dependent    DJD (degenerative joint disease)     GERD (gastroesophageal reflux disease)     Herpes zoster     HLD (hyperlipidemia)     HTN (hypertension)     Hyperlipidemia     Hypothyroidism     Irritable bowel syndrome     Lumbar radiculopathy     Lyme disease     Dx in hospital 8/2011    Lyme disease     MI (myocardial infarction) ([facility] [address] )     Migraines     Muscle spasm     Non-neoplastic nevus     Nontoxic multinodular goiter     OA (osteoarthritis)     Osteoarthritis     Other chronic pain     PAD (peripheral artery disease) ([facility])     Palpitations     Pseudogout     Spinal stenosis     Transient cerebral ischemia     Trigger ring finger     Viral gastroenteritis      Past Surgical History:   Procedure Laterality Date    APPENDECTOMY      ARTERIOGRAM  12/19/2017    CARDIAC CATHETERIZATION      CHOLECYSTECTOMY      COLONOSCOPY      CORONARY ARTERY BYPASS GRAFT  2004    LUMBAR LAMINECTOMY      ID ECHO TRANSESOPHAG R-T 2D W/PRB IMG ACQUISJ I&R N/A 4/3/2018    Procedure: TRANSESOPHAGEAL ECHOCARDIOGRAM (ROBB); Surgeon: Javad Allen DO;  [facility];  Service: Cardiac Surgery    ID REPLACE AORTIC VALVE OPENFEMORAL ARTERY APPROACH N/A 4/3/2018    Procedure: REPLACEMENT AORTIC VALVE TRANSCATHETER (TAVR) TRANSFEMORAL w/ 26MM IVY YEVGENIY S3 VALVE;  Surgeon: Javad Allen DO;  [facility];  Service: Cardiac Surgery    THYROIDECTOMY      TOTAL ABDOMINAL HYSTERECTOMY W/ BILATERAL SALPINGOOPHORECTOMY       Social History   History   Alcohol Use No     History   Drug Use No     History   Smoking Status    Never Smoker   Smokeless Tobacco    Never Used     Family History:   Family History   Problem Relation Age of Onset    Cancer Mother     Stroke Mother     Cancer Father     Heart disease Father     Breast cancer Sister     Diabetes Daughter      Passed away January 2017        Review of previous medical records was reviewed and completed  Meds/Allergies   all current active meds have been reviewed    Allergies   Allergen Reactions    Other Chest Pain     IVP-listed as "chest pain" in previous chart, patient stated this occurred "a long time ago" but does not remember exactly occurred     Cephalosporins Chest Pain    Contrast [Iodinated Diagnostic Agents] Other (See Comments)     Flash pulm edema    Doxycycline Chest Pain    Levaquin [Levofloxacin] Chest Pain    Ondansetron Chest Pain     Prolonged QT    Toradol [Ketorolac Tromethamine] Chest Pain       Objective   Vitals:Blood pressure 156/70, pulse 91, temperature 98 8 °F (37 1 °C), temperature source Oral, resp  rate (!) 24, height 5' 4" (1 626 m), weight 73 5 kg (162 lb), SpO2 100 %, not currently breastfeeding  ,Body mass index is 27 81 kg/m²  Intake/Output Summary (Last 24 hours) at 04/29/18 1146  Last data filed at 04/29/18 0100   Gross per 24 hour   Intake                0 ml   Output               50 ml   Net              -50 ml       Invasive Devices: Invasive Devices     Peripheral Intravenous Line            Peripheral IV 04/28/18 Right Antecubital less than 1 day    Peripheral IV 04/29/18 Right Arm less than 1 day          Airway            ETT  Hi-Lo; Cuffed 7 5 mm less than 1 day                Physical Exam   General-somnolent, not responding to sternal rub and opened eyes to voice but does not follow any commands  HEENT:  Normocephalic, atraumatic  Eyes:  Pupils equal round react to light  Chest:  Positive crackles heard  Heart:  Normal S1-S2, no murmurs rubs or gallops  Abdomen:  Soft nontender     Neurologic Exam  Not responding to sternal rub or following any commands  Speech:  Nonverbal  Cranial nerves:  Pupils equal round reactive to light, doll's eye intact  Motor:  No Movement noted spontaneously in all 4 extremities, normal tone, no clonus noted  Sensory:  Does not withdraw the painful stimulus in all 4 extremities  Reflexes:  2+ throughout, downgoing toes bilaterally  Cerebellar:   For due to patient's current mental status  Gait:  Referred    Lab Results:   CBC:   Results from last 7 days  Lab Units 04/29/18  1028 04/29/18  1025 04/28/18  2140  04/28/18  0913   WBC Thousand/uL 15 93*  --  9 60  --  9 30   RBC Million/uL 4 76  --  4 40  --  4 56   HEMOGLOBIN g/dL 9 9*  --  9 2*  --  9 5*   I STAT HEMOGLOBIN g/dl  --  10 5*  --   < >  --    HEMATOCRIT % 33 4*  --  30 1*  --  30 7*   MCV fL 70*  --  68*  --  67*   PLATELETS Thousands/uL 359  --  309  --  315   < > = values in this interval not displayed , BMP/CMP:   Results from last 7 days  Lab Units 04/29/18  1029 04/29/18  1025 04/29/18  0450 04/28/18  2140   SODIUM mmol/L 141  --  139 140   POTASSIUM mmol/L 3 7  --  3 5 3 6   CHLORIDE mmol/L 106  --  108 107   CO2 mmol/L 11*  --  15* 21   ANION GAP mmol/L 24*  --  16* 12   BUN mg/dL 14  --  12 11   CREATININE mg/dL 1 13  --  0 82 0 72   GLUCOSE RANDOM mg/dL 204*  --  132 122   GLUCOSE, ISTAT mg/dl  --  220*  --   --    CALCIUM mg/dL 6 3*  --  5 9* 6 0*   AST U/L 43  --  30 28   ALT U/L 21  --  19 19   ALK PHOS U/L 73  --  70 72   TOTAL PROTEIN g/dL 7 5  --  7 3 7 4   BILIRUBIN TOTAL mg/dL 0 83  --  0 81 0 83   EGFR ml/min/1 73sq m 46  --  68 79   , Vitamin B12:   , Lipid Profile:   , Ammonia:   Results from last 7 days  Lab Units 04/28/18  2218   AMMONIA umol/L 26   , Urinalysis:   Results from last 7 days  Lab Units 04/28/18  1039 04/28/18  1035   COLOR UA  Yellow yellow   CLARITY UA  Clear  --    SPEC GRAV UA  1 025  --    PH UA  5 5  --    LEUKOCYTES UA  Negative  --    NITRITE UA  Negative  --    PROTEIN UA mg/dl 30 (1+)*  --    GLUCOSE UA mg/dl Negative  --    KETONES UA mg/dl Negative  --    BILIRUBIN UA  Negative  --    BLOOD UA  Trace*  --    , Medication Drug Levels:       Invalid input(s): CARBAMAZEPINE,  PHENOBARB, LACOSAMIDE, OXCARBAZEPINE  Imaging Studies: I have personally reviewed pertinent films in PACS  EKG, Pathology, and Other Studies: I have personally reviewed pertinent films in PACS with a Radiologist   VTE Prophylaxis: RX contraindicated due to: xarelto    Code Status: Level 1 - Full Code  Advance Directive and Living Will:      Power of :    POLST:      Counseling / Coordination of Care  N/A

## 2018-04-29 NOTE — PROGRESS NOTES
Discussed with patients son, Pillo Walker, the need for IV access, difficulty with peripheral IV placement and multiple lines losing function which has led for the need for central line access  I explained the risks associated with the procedure including infection, bleeding, pnx and remainder of the consent form with the son  Discussed the need for multiport access and multiple IV medications  He states he is aware of the risks and benefits and is consenting for the procedure

## 2018-04-29 NOTE — PLAN OF CARE
Problem: Potential for Falls  Goal: Patient will remain free of falls  INTERVENTIONS:  - Assess patient frequently for physical needs  -  Identify cognitive and physical deficits and behaviors that affect risk of falls    -  [place] fall precautions as indicated by assessment   - Educate patient/family on patient safety including physical limitations  - Instruct patient to call for assistance with activity based on assessment  - Modify environment to reduce risk of injury  - Consider OT/PT consult to assist with strengthening/mobility    Outcome: Progressing      Problem: PAIN - ADULT  Goal: Verbalizes/displays adequate comfort level or baseline comfort level  Interventions:  - Encourage patient to monitor pain and request assistance  - Assess pain using appropriate pain scale  - Administer analgesics based on type and severity of pain and evaluate response  - Implement non-pharmacological measures as appropriate and evaluate response  - Consider cultural and social influences on pain and pain management  - Notify physician/advanced practitioner if interventions unsuccessful or patient reports new pain   Outcome: Progressing      Problem: INFECTION - ADULT  Goal: Absence or prevention of progression during hospitalization  INTERVENTIONS:  - Assess and monitor for signs and symptoms of infection  - Monitor lab/diagnostic results  - Monitor all insertion sites, i e  indwelling lines, tubes, and drains  - Monitor endotracheal (as able) and nasal secretions for changes in amount and color  - [place] appropriate cooling/warming therapies per order  - Administer medications as ordered  - Instruct and encourage patient and family to use good hand hygiene technique  - Identify and instruct in appropriate isolation precautions for identified infection/condition   Outcome: Progressing    Goal: Absence of fever/infection during neutropenic period  INTERVENTIONS:  - Monitor WBC  - Implement neutropenic guidelines   Outcome: Progressing      Problem: SAFETY ADULT  Goal: Maintain or return to baseline ADL function  INTERVENTIONS:  -  Assess patient's ability to carry out ADLs; assess patient's baseline for ADL function and identify physical deficits which impact ability to perform ADLs (bathing, care of mouth/teeth, toileting, grooming, dressing, etc )  - Assess/evaluate cause of self-care deficits   - Assess range of motion  - Assess patient's mobility; develop plan if impaired  - Assess patient's need for assistive devices and provide as appropriate  - Encourage maximum independence but intervene and supervise when necessary  ¯ Involve family in performance of ADLs  ¯ Assess for home care needs following discharge   ¯ Request OT consult to assist with ADL evaluation and planning for discharge  ¯ Provide patient education as appropriate   Outcome: Progressing    Goal: Maintain or return mobility status to optimal level  INTERVENTIONS:  - Assess patient's baseline mobility status (ambulation, transfers, stairs, etc )    - Identify cognitive and physical deficits and behaviors that affect mobility  - Identify mobility aids required to assist with transfers and/or ambulation (gait belt, sit-to-stand, lift, walker, cane, etc )  - [place] fall precautions as indicated by assessment  - Record patient progress and toleration of activity level on Mobility SBAR; progress patient to next Phase/Stage  - Instruct patient to call for assistance with activity based on assessment  - Request Rehabilitation consult to assist with strengthening/weightbearing, etc    Outcome: Progressing    Goal: Patient will remain free of falls  INTERVENTIONS:  - Assess patient frequently for physical needs  -  Identify cognitive and physical deficits and behaviors that affect risk of falls    -  [place] fall precautions as indicated by assessment   - Educate patient/family on patient safety including physical limitations  - Instruct patient to call for assistance with activity based on assessment  - Modify environment to reduce risk of injury  - Consider OT/PT consult to assist with strengthening/mobility    Outcome: Progressing      Problem: DISCHARGE PLANNING  Goal: Discharge to home or other facility with appropriate resources  INTERVENTIONS:  - Identify barriers to discharge w/patient and caregiver  - Arrange for needed discharge resources and transportation as appropriate  - Identify discharge learning needs (meds, wound care, etc )  - Arrange for interpretive services to assist at discharge as needed  - Refer to Case Management Department for coordinating discharge planning if the patient needs post-hospital services based on physician/advanced practitioner order or complex needs related to functional status, cognitive ability, or social support system   Outcome: Progressing      Problem: Knowledge Deficit  Goal: Patient/family/caregiver demonstrates understanding of disease process, treatment plan, medications, and discharge instructions  Complete learning assessment and assess knowledge base    Interventions:  - Provide teaching at level of understanding  - Provide teaching via preferred learning methods   Outcome: Progressing      Problem: Prexisting or High Potential for Compromised Skin Integrity  Goal: Skin integrity is maintained or improved  INTERVENTIONS:  - Identify patients at risk for skin breakdown  - Assess and monitor skin integrity  - Assess and monitor nutrition and hydration status  - Monitor labs (i e  albumin)  - Assess for incontinence   - Turn and reposition patient  - Assist with mobility/ambulation  - Relieve pressure over bony prominences  - Avoid friction and shearing  - Provide appropriate hygiene as needed including keeping skin clean and dry  - Evaluate need for skin moisturizer/barrier cream  - Collaborate with interdisciplinary team (i e  Nutrition, Rehabilitation, etc )   - Patient/family teaching   Outcome: Progressing      Problem: SAFETY,RESTRAINT: NV/NON-SELF DESTRUCTIVE BEHAVIOR  Goal: Remains free of harm/injury (restraint for non violent/non self-detsructive behavior)  INTERVENTIONS:  - Instruct patient/family regarding restraint use   - Assess and monitor physiologic and psychological status   - Provide interventions and comfort measures to meet assessed patient needs   - Identify and implement measures to help patient regain control  - Assess readiness for release of restraint   Outcome: Progressing    Goal: Returns to optimal restraint-free functioning  INTERVENTIONS:  - Assess the patient's behavior and symptoms that indicate continued need for restraint  - Identify and implement measures to help patient regain control  - Assess readiness for release of restraint   Outcome: Progressing

## 2018-04-29 NOTE — PROGRESS NOTES
[facility] Senior Admission Note   Unit/Bed # CW2 218-01 Encounter: [id number]  SOD Team C           Christophe Boys [de-identified] y o  female [id number]    This is an 41-year-old female with extensive past medical history presents with 2 day history of progressively worsening trembling, nausea, vomiting, and altered mental status  Today she presents status post multiple episodes of vomiting, was initially discharged the ED but then came back with more vomiting and altered mental status  Patient appears tachypneic on exam and has some word-finding difficulty  Does not otherwise appear to be in acute distress  Patient seen and examined  Reviewed H&P per Dr Grace Mendoza  Agree with the assessment and plan       Assessment/Plan:   Principal Problem:    Encephalopathy acute  Active Problems:    Vomiting    Essential hypertension    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 native heart with angina pectoris (HCC)    Type 2 diabetes mellitus with complication, with long-term current use of insulin (HCC)    Atrial flutter (HCC)    Hyperlipidemia    Gastroesophageal reflux disease without esophagitis    Asthma    Asymptomatic carotid artery stenosis, bilateral    Atherosclerosis of artery of extremity with ulceration (HCC)    Hx of CABG    Chronic anticoagulation    Chronic systolic heart failure (HCC)    Postoperative hypothyroidism    Paroxysmal atrial fibrillation (HCC)    Expressive aphasia    Multiple lacunar infarcts (HCC)    History of CVA (cerebrovascular accident)       Disposition:  OBSERVATION    Expected LOS: >2 Midnights      Max Tyler DO

## 2018-04-29 NOTE — PLAN OF CARE
Problem: Potential for Falls  Goal: Patient will remain free of falls  INTERVENTIONS:  - Assess patient frequently for physical needs  -  Identify cognitive and physical deficits and behaviors that affect risk of falls  -  [place] fall precautions as indicated by assessment   - Educate patient/family on patient safety including physical limitations  - Instruct patient to call for assistance with activity based on assessment  - Modify environment to reduce risk of injury  - Consider OT/PT consult to assist with strengthening/mobility   Outcome: Progressing      Problem: SAFETY ADULT  Goal: Patient will remain free of falls  INTERVENTIONS:  - Assess patient frequently for physical needs  -  Identify cognitive and physical deficits and behaviors that affect risk of falls    -  [place] fall precautions as indicated by assessment   - Educate patient/family on patient safety including physical limitations  - Instruct patient to call for assistance with activity based on assessment  - Modify environment to reduce risk of injury  - Consider OT/PT consult to assist with strengthening/mobility   Outcome: Progressing

## 2018-04-29 NOTE — PROCEDURES
Intubation  Date/Time: 4/29/2018 4:21 PM  Performed by: Alli Gamble by: Izabel Woody     Patient location:  Bedside  Procedure performed by consultant: Dr Elke Landry  Consent:     Consent obtained:  Emergent situation  Pre-procedure details:     Patient status:  Altered mental status    Mallampati score:  3    Pretreatment medications:  Etomidate    Paralytics:  None  Indications:     Indications for intubation: airway protection    Procedure details:     Preoxygenation: NC then non-rebreather, then BVM  CPR in progress: no      Intubation method:  Oral    Oral intubation technique:  Direct    Laryngoscope blade: Mac 3    Tube size (mm):  7 5    Tube type:  Cuffed    Number of attempts:  1    Ventilation between attempts: no      Cricoid pressure: no      Tube visualized through cords: yes    Placement assessment:     ETT to lip:  20    Tube secured with:  ETT carmona    Breath sounds:  Equal and absent over the epigastrium    Placement verification: chest rise, condensation, direct visualization, equal breath sounds and tube exhalation    Post-procedure details:     Patient tolerance of procedure:   Tolerated well, no immediate complications

## 2018-04-29 NOTE — PROGRESS NOTES
[facility] Residency Progress Note   Unit/Bed#: CW2 218-01 Encounter: [id number]  SOD Team C       Mary Anne Martínez [de-identified] y o  female [id number]    Hospital Stay Days: 0      Assessment/Plan:    Principal Problem:    Encephalopathy acute  -patient with recent admission or stroke-like symptoms, switched from Coumadin to Xarelto by Neurology  -during morning rounds noticed to have tonic-clonic seizure,  rapid response was called, patient was given Ativan, patient was intubated at bedside by the critical care team  -Neurology was consulted, CT of the head negative for acute abnormalities or bleeding  -transferred to ICU, family was updated (son Ezekiel),patient will have continuous EEG monitoring  Active Problems:    Essential hypertension  -continue metoprolol on admission  -patient was hypertensive on admission, started on labetalol p r n  for systolic  greater than 634    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 native heart with angina pectoris ([facility] [address] )  -status post CABG  -on aspirin, high-intensity statin, beta-blocker    Type 2 diabetes mellitus with complication, with long-term current use of insulin (HCC)  -recent A1c 7 7  -on long-acting insulin, Lantus 30 units daily  -sliding scale of insulin    Atrial flutter (HCC)  -on beta-blocker  -heart rate controlled    Chronic anticoagulation  -noticed to have elevated INR on admission considered to be secondary to Xarelto, INR started trending down and xarelto was continued on admission and then INR is started to go up again, CT of the head showed no intracranial bleeding    Chronic systolic heart failure ([facility] [address] )  -Recent echo showed EF of 50%  -on ACE-inhibitor and beta-blocker    Postoperative hypothyroidism  -continue outpatient dose of Synthroid    Paroxysmal atrial fibrillation (HCC)  -beta-blocker  -anticoagulated on Xarelto    History of CVA (cerebrovascular accident)  -multiple lacunar strokes diagnosed on previous admission, started on Xarelto by Neurology      Disposition:  Continue medical care at ICU      Subjective:   Patient seen and examined at the bed, patient was awake but poorly alert and disoriented, complaining about nausea without vomiting, had tonic-clonic seizure during morning rounds     Vitals: Temp (24hrs), Av 4 °F (36 9 °C), Min:98 2 °F (36 8 °C), Max:98 8 °F (37 1 °C)  Current: Temperature: 98 8 °F (37 1 °C)  Vitals:    18 2245 18 2300 18 0100 18 0311   BP: (!) 183/84 (!) 198/93 168/70 (!) 178/82   BP Location: Right arm Right arm Left arm Left arm   Pulse: (!) 108 104 85 83   Resp: (!) 26 (!) 26 (!) 24 (!) 24   Temp:  98 2 °F (36 8 °C)  98 8 °F (37 1 °C)   TempSrc:  Oral  Oral   SpO2: 96% (!) 88% 92% 92%   Weight:  73 5 kg (162 lb)     Height:  5' 4" (1 626 m)      Body mass index is 27 81 kg/m²  I/O last 24 hours: In: -   Out: 48 [Stool:50]      Physical Exam   Constitutional: She appears well-developed and well-nourished  HENT:   Head: Normocephalic and atraumatic  Eyes: Pupils are equal, round, and reactive to light  Neck: Normal range of motion  Neck supple  Cardiovascular: Normal rate  Pulmonary/Chest: Effort normal  She has no wheezes  She has rales  Abdominal: Soft  Bowel sounds are normal  She exhibits no mass  There is no tenderness  There is no rebound, no guarding and no CVA tenderness  Skin: Skin is warm and dry  Nursing note and vitals reviewed        Invasive Devices     Peripheral Intravenous Line            Peripheral IV 18 Right Antecubital less than 1 day                Labs:   Recent Results (from the past 24 hour(s))   ECG 12 lead    Collection Time: 18  8:47 AM   Result Value Ref Range    Ventricular Rate 101 BPM    Atrial Rate 102 BPM    NC Interval  ms    QRSD Interval 150 ms    QT Interval 400 ms    QTC Interval 518 ms    P Axis 91 degrees    QRS Axis -65 degrees    T Wave Axis 99 degrees   CBC and differential    Collection Time: 18  9:13 AM Result Value Ref Range    WBC 9 30 4 31 - 10 16 Thousand/uL    RBC 4 56 3 81 - 5 12 Million/uL    Hemoglobin 9 5 (L) 11 5 - 15 4 g/dL    Hematocrit 30 7 (L) 34 8 - 46 1 %    MCV 67 (L) 82 - 98 fL    MCH 20 8 (L) 26 8 - 34 3 pg    MCHC 30 9 (L) 31 4 - 37 4 g/dL    RDW 18 8 (H) 11 6 - 15 1 %    MPV 10 7 8 9 - 12 7 fL    Platelets 599 029 - 149 Thousands/uL    nRBC 0 /100 WBCs    Neutrophils Relative 71 43 - 75 %    Lymphocytes Relative 21 14 - 44 %    Monocytes Relative 6 4 - 12 %    Eosinophils Relative 2 0 - 6 %    Basophils Relative 0 0 - 1 %    Neutrophils Absolute 6 59 1 85 - 7 62 Thousands/µL    Lymphocytes Absolute 1 93 0 60 - 4 47 Thousands/µL    Monocytes Absolute 0 59 0 17 - 1 22 Thousand/µL    Eosinophils Absolute 0 14 0 00 - 0 61 Thousand/µL    Basophils Absolute 0 03 0 00 - 0 10 Thousands/µL   Comprehensive metabolic panel    Collection Time: 04/28/18  9:13 AM   Result Value Ref Range    Sodium 141 136 - 145 mmol/L    Potassium 3 1 (L) 3 5 - 5 3 mmol/L    Chloride 107 100 - 108 mmol/L    CO2 21 21 - 32 mmol/L    Anion Gap 13 4 - 13 mmol/L    BUN 9 5 - 25 mg/dL    Creatinine 0 70 0 60 - 1 30 mg/dL    Glucose 107 65 - 140 mg/dL    Calcium 6 2 (L) 8 3 - 10 1 mg/dL    AST 33 5 - 45 U/L    ALT 19 12 - 78 U/L    Alkaline Phosphatase 74 46 - 116 U/L    Total Protein 7 6 6 4 - 8 2 g/dL    Albumin 2 9 (L) 3 5 - 5 0 g/dL    Total Bilirubin 0 67 0 20 - 1 00 mg/dL    eGFR 82 ml/min/1 73sq m   Protime-INR    Collection Time: 04/28/18  9:13 AM   Result Value Ref Range    Protime 40 0 (H) 12 1 - 14 4 seconds    INR 4 04 (H) 0 86 - 1 16   APTT    Collection Time: 04/28/18  9:13 AM   Result Value Ref Range    PTT 50 (H) 23 - 35 seconds   Lipase    Collection Time: 04/28/18  9:13 AM   Result Value Ref Range    Lipase 139 73 - 393 u/L   Troponin I    Collection Time: 04/28/18  9:13 AM   Result Value Ref Range    Troponin I 0 03 <=0 04 ng/mL   TSH, 3rd generation with T4 reflex    Collection Time: 04/28/18  9:13 AM Result Value Ref Range    TSH 3RD GENERATON 1 650 0 358 - 3 740 uIU/mL   POCT urinalysis dipstick    Collection Time: 04/28/18 10:35 AM   Result Value Ref Range    Color, UA yellow    ED Urine Macroscopic    Collection Time: 04/28/18 10:39 AM   Result Value Ref Range    Color, UA Yellow     Clarity, UA Clear     pH, UA 5 5 4 5 - 8 0    Leukocytes, UA Negative Negative    Nitrite, UA Negative Negative    Protein, UA 30 (1+) (A) Negative mg/dl    Glucose, UA Negative Negative mg/dl    Ketones, UA Negative Negative mg/dl    Urobilinogen, UA 0 2 0 2, 1 0 E U /dl E U /dl    Bilirubin, UA Negative Negative    Blood, UA Trace (A) Negative    Specific Gravity, UA 1 025 1 003 - 1 030   Urine Microscopic    Collection Time: 04/28/18 10:39 AM   Result Value Ref Range    RBC, UA None Seen None Seen, 0-5 /hpf    WBC, UA None Seen None Seen, 0-5, 5-55, 5-65 /hpf    Epithelial Cells None Seen None Seen, Occasional /hpf    Bacteria, UA None Seen None Seen, Occasional /hpf    Hyaline Casts, UA 5-10 (A) None Seen /lpf   POCT Chem 8+    Collection Time: 04/28/18  9:18 PM   Result Value Ref Range    SODIUM, I-STAT 142 136 - 145 mmol/l    Potassium, i-STAT 3 7 3 5 - 5 3 mmol/L    Chloride, istat 105 100 - 108 mmol/L    CO2, i-STAT 20 (L) 21 - 32 mmol/L    Anion Gap, Istat 21 (H) 4 - 13 mmol/L    Calcium, Ionized i-STAT 0 69 (LL) 1 12 - 1 32 mmol/L    BUN, I-STAT 9 5 - 25 mg/dl    Creatinine, i-STAT 0 7 0 6 - 1 3 mg/dl    eGFR 82 ml/min/1 73sq m    Glucose, i-STAT 129 65 - 140 mg/dl    Hct, i-STAT 29 (L) 34 8 - 46 1 %    Hgb, i-STAT 9 9 (L) 11 5 - 15 4 g/dl    Specimen Type VENOUS    Comprehensive metabolic panel    Collection Time: 04/28/18  9:40 PM   Result Value Ref Range    Sodium 140 136 - 145 mmol/L    Potassium 3 6 3 5 - 5 3 mmol/L    Chloride 107 100 - 108 mmol/L    CO2 21 21 - 32 mmol/L    Anion Gap 12 4 - 13 mmol/L    BUN 11 5 - 25 mg/dL    Creatinine 0 72 0 60 - 1 30 mg/dL    Glucose 122 65 - 140 mg/dL    Calcium 6 0 (L) 8 3 - 10 1 mg/dL    AST 28 5 - 45 U/L    ALT 19 12 - 78 U/L    Alkaline Phosphatase 72 46 - 116 U/L    Total Protein 7 4 6 4 - 8 2 g/dL    Albumin 3 0 (L) 3 5 - 5 0 g/dL    Total Bilirubin 0 83 0 20 - 1 00 mg/dL    eGFR 79 ml/min/1 73sq m   CBC and differential    Collection Time: 04/28/18  9:40 PM   Result Value Ref Range    WBC 9 60 4 31 - 10 16 Thousand/uL    RBC 4 40 3 81 - 5 12 Million/uL    Hemoglobin 9 2 (L) 11 5 - 15 4 g/dL    Hematocrit 30 1 (L) 34 8 - 46 1 %    MCV 68 (L) 82 - 98 fL    MCH 20 9 (L) 26 8 - 34 3 pg    MCHC 30 6 (L) 31 4 - 37 4 g/dL    RDW 18 7 (H) 11 6 - 15 1 %    MPV 10 1 8 9 - 12 7 fL    Platelets 936 203 - 413 Thousands/uL    nRBC 0 /100 WBCs    Neutrophils Relative 74 43 - 75 %    Lymphocytes Relative 19 14 - 44 %    Monocytes Relative 6 4 - 12 %    Eosinophils Relative 1 0 - 6 %    Basophils Relative 0 0 - 1 %    Neutrophils Absolute 7 09 1 85 - 7 62 Thousands/µL    Lymphocytes Absolute 1 79 0 60 - 4 47 Thousands/µL    Monocytes Absolute 0 60 0 17 - 1 22 Thousand/µL    Eosinophils Absolute 0 06 0 00 - 0 61 Thousand/µL    Basophils Absolute 0 03 0 00 - 0 10 Thousands/µL   Protime-INR    Collection Time: 04/28/18  9:40 PM   Result Value Ref Range    Protime 41 3 (H) 12 1 - 14 4 seconds    INR 4 21 (H) 0 86 - 1 16   APTT    Collection Time: 04/28/18  9:40 PM   Result Value Ref Range    PTT 51 (H) 23 - 35 seconds   Lipase    Collection Time: 04/28/18  9:40 PM   Result Value Ref Range    Lipase 112 73 - 393 u/L   Ammonia    Collection Time: 04/28/18 10:18 PM   Result Value Ref Range    Ammonia 26 11 - 35 umol/L   Fingerstick Glucose (POCT)    Collection Time: 04/29/18 12:24 AM   Result Value Ref Range    POC Glucose 171 (H) 65 - 140 mg/dl   Blood gas, arterial    Collection Time: 04/29/18  1:11 AM   Result Value Ref Range    pH, Arterial 7 376 7 350 - 7 450    pCO2, Arterial 28 4 (LL) 36 0 - 44 0 mm Hg    pO2, Arterial 91 9 75 0 - 129 0 mm Hg    HCO3, Arterial 16 3 (L) 22 0 - 28 0 mmol/L    Base Excess, Arterial -7 8 mmol/L    O2 Content, Arterial 13 9 (L) 16 0 - 23 0 mL/dL    O2 HGB,Arterial  95 8 94 0 - 97 0 %    SOURCE Radial, Right     ZAY TEST Yes     Nasal Cannula 4    TSH, 3rd generation    Collection Time: 04/29/18  1:20 AM   Result Value Ref Range    TSH 3RD GENERATON 1 940 0 358 - 3 740 uIU/mL   Protime-INR    Collection Time: 04/29/18  1:20 AM   Result Value Ref Range    Protime 37 7 (H) 12 1 - 14 4 seconds    INR 3 75 (H) 0 86 - 1 16   Lactic acid, plasma    Collection Time: 04/29/18  2:21 AM   Result Value Ref Range    LACTIC ACID 3 8 (HH) 0 5 - 2 0 mmol/L   Fingerstick Glucose (POCT)    Collection Time: 04/29/18  6:18 AM   Result Value Ref Range    POC Glucose 219 (H) 65 - 140 mg/dl       Radiology Results: I have personally reviewed pertinent reports  and I have personally reviewed pertinent films in PACS      Other Diagnostic Testing:   I have personally reviewed pertinent reports     and I have personally reviewed pertinent films in PACS      Active Meds:   Current Facility-Administered Medications   Medication Dose Route Frequency    acetaminophen (TYLENOL) tablet 650 mg  650 mg Oral Q6H PRN    albuterol (PROVENTIL HFA,VENTOLIN HFA) inhaler 2 puff  2 puff Inhalation Q6H PRN    aspirin (ECOTRIN LOW STRENGTH) EC tablet 81 mg  81 mg Oral Daily    atorvastatin (LIPITOR) tablet 80 mg  80 mg Oral Daily With Dinner    calcium carbonate (TUMS) chewable tablet 1,000 mg  1,000 mg Oral TID    cholecalciferol (VITAMIN D3) tablet 1,000 Units  1,000 Units Oral Daily    docusate sodium (COLACE) capsule 100 mg  100 mg Oral BID    fluticasone (FLONASE) 50 mcg/act nasal spray 1 spray  1 spray Nasal Daily    fluticasone (FLOVENT HFA) 110 MCG/ACT inhaler 1 puff  1 puff Inhalation Q12H [address]    hydrALAZINE (APRESOLINE) injection 10 mg  10 mg Intravenous Q6H PRN    insulin glargine (LANTUS) subcutaneous injection 30 Units 0 3 mL  30 Units Subcutaneous Q12H [address]    insulin lispro (HumaLOG) 100 units/mL subcutaneous injection 1-5 Units  1-5 Units Subcutaneous TID AC    insulin lispro (HumaLOG) 100 units/mL subcutaneous injection 1-5 Units  1-5 Units Subcutaneous HS    levothyroxine tablet 100 mcg  100 mcg Oral Daily    lisinopril (ZESTRIL) tablet 2 5 mg  2 5 mg Oral HS    loratadine (CLARITIN) tablet 10 mg  10 mg Oral Daily    metoclopramide (REGLAN) injection 10 mg  10 mg Intravenous Q6H PRN    metoprolol tartrate (LOPRESSOR) tablet 100 mg  100 mg Oral Q12H [address]    multi-electrolyte (ISOLYTE-S PH 7 4 equivalent) IV solution  100 mL/hr Intravenous Continuous    pantoprazole (PROTONIX) EC tablet 40 mg  40 mg Oral Daily    polyethylene glycol (MIRALAX) packet 17 g  17 g Oral Daily    potassium chloride (K-DUR,KLOR-CON) CR tablet 10 mEq  10 mEq Oral BID    rivaroxaban (XARELTO) tablet 20 mg  20 mg Oral Daily         VTE Pharmacologic Prophylaxis: Reason for no pharmacologic prophylaxis patient is on xarelto  VTE Mechanical Prophylaxis: sequential compression device    [employer]

## 2018-04-29 NOTE — ED PROVIDER NOTES
History  Chief Complaint   Patient presents with    Vomiting     pt  co N/V and continued weakness     Patient is an 3-year-old female presenting to the ER today with a chief complaint of nausea, vomiting and confusion  Patient was seen in this emergency room earlier today for  Vomiting  Patient had a workup which included CT scan laboratory work and treatment with antiemetics such as Reglan  Patient felt better was discharged  When patient got home patient states she became nauseated again  Patient does not remember calling ambulance for coming to the ER  Upon arrival to the ER patient is confused  No focal neurologic deficit is appreciated  History provided by:  Patient   used: No    Vomiting   Severity:  Mild  Timing:  Constant  Quality:  Unable to specify  Progression:  Worsening  Chronicity:  New  Recent urination:  Normal  Relieved by:  Nothing  Worsened by:  Nothing  Ineffective treatments:  None tried      Prior to Admission Medications   Prescriptions Last Dose Informant Patient Reported? Taking? LANTUS 100 UNIT/ML subcutaneous injection 4/27/2018 at Unknown time  No Yes   Sig: Inject 20 Units under the skin 2 (two) times a day   Patient taking differently: Inject 30 Units under the skin 2 (two) times a day     Mometasone [facility] HFA) 100 MCG/ACT AERO 4/28/2018 at Unknown time Self No Yes   Sig: Inhale 2 puffs once daily   acetaminophen (TYLENOL) 650 mg CR tablet Unknown at Unknown time  No No   Sig: Take 1 tablet (650 mg total) by mouth every 8 (eight) hours as needed for mild pain Do not take in conjunction with Percocet     albuterol (VENTOLIN HFA) 90 mcg/act inhaler Unknown at Unknown time Self Yes No   Sig: Inhale 108 mcg 2 (two) times a day as needed 2 puffs bid PRN   aspirin 81 MG tablet 4/28/2018 at Unknown time Self Yes Yes   Sig: Take 81 mg by mouth daily   atorvastatin (LIPITOR) 80 mg tablet 4/28/2018 at Unknown time  No Yes   Sig: Take 1 tablet (80 mg total) by mouth daily   calcium carbonate (TUMS) 500 mg chewable tablet 2018 at Unknown time Self Yes Yes   Sig: Chew 2 tablets 3 (three) times a day     cholecalciferol (VITAMIN D3) 1,000 units tablet 2018 at Unknown time Self Yes Yes   Sig: Take 1,000 Units by mouth daily     dextromethorphan-guaiFENesin (ROBITUSSIN DM)  mg/5 mL syrup Unknown at Unknown time  Yes No   Sig: Take 10 mL by mouth every 4 (four) hours as needed for cough   diphenhydrAMINE (BANOPHEN) 50 mg capsule 2018 at Unknown time  Yes Yes   Sig: Take 50 mg by mouth daily   docusate sodium (COLACE) 100 mg capsule 2018 at Unknown time  No Yes   Sig: Take 1 capsule (100 mg total) by mouth 2 (two) times a day   esomeprazole (NexIUM) 20 mg capsule 2018 at Unknown time Self Yes Yes   Sig: Take 20 mg by mouth daily in the early morning     fluticasone (FLONASE) 50 mcg/act nasal spray 2018 at Unknown time  Yes Yes   Si spray into each nostril daily   furosemide (LASIX) 40 mg tablet 2018 at Unknown time  Yes Yes   Sig: Take 40 mg by mouth 2 (two) times a day   glucose blood test strip 2018 at Unknown time  Yes Yes   Si each by Other route 4 (four) times a day (before meals and at bedtime) Use as instructed   levothyroxine 100 mcg tablet 2018 at Unknown time Self Yes Yes   Sig: Take 100 mcg by mouth daily   lisinopril (ZESTRIL) 5 mg tablet 2018 at Unknown time Self Yes Yes   Sig: Take 2 5 mg by mouth daily at bedtime     loratadine (CLARITIN) 10 mg tablet 2018 at Unknown time Self No Yes   Sig: Take 1 tablet (10 mg total) by mouth daily   metFORMIN (GLUCOPHAGE) 1000 MG tablet 2018 at Unknown time Self Yes Yes   Si,000 mg 2 (two) times a day   metoprolol tartrate (LOPRESSOR) 100 mg tablet 2018 at Unknown time  No Yes   Sig: Take 1 tablet (100 mg total) by mouth every 12 (twelve) hours   nitroglycerin (NITROSTAT) 0 4 mg SL tablet 2018 at Unknown time Self Yes Yes   Sig: Place 0 4 mg under the tongue every 3 (three) minutes as needed   pantoprazole (PROTONIX) 40 mg tablet 4/28/2018 at Unknown time  Yes Yes   Sig: Take 40 mg by mouth daily   polyethylene glycol (MIRALAX) 17 g packet 4/28/2018 at Unknown time  Yes Yes   Sig: Take 17 g by mouth daily   potassium chloride (K-DUR,KLOR-CON) 10 mEq tablet 4/28/2018 at Unknown time  Yes Yes   Sig: Take 10 mEq by mouth 2 (two) times a day   rivaroxaban (XARELTO) 20 mg tablet 4/28/2018 at Unknown time  No Yes   Sig: Take 1 tablet (20 mg total) by mouth daily      Facility-Administered Medications: None       Past Medical History:   Diagnosis Date    Altered mental status     Anticoagulated     Coumadin for Aflutter    Asthma     Atrial flutter (HCC)     maintained on coumadin anticoag    CAD (coronary artery disease)     Candidiasis     Carotid stenosis, bilateral     Cataract     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) ([facility])     Chronic fatigue syndrome     Chronic low back pain     Concussion w loss of consciousness of unsp duration, init     Coronary artery disease     CVA (cerebral vascular accident) ([facility] [address] ) 4/17/2018    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[address] )     type 2, insulin dependent    DJD (degenerative joint disease)     GERD (gastroesophageal reflux disease)     Herpes zoster     HLD (hyperlipidemia)     HTN (hypertension)     Hyperlipidemia     Hypothyroidism     Irritable bowel syndrome     Lumbar radiculopathy     Lyme disease     Dx in hospital 8/2011    Lyme disease     MI (myocardial infarction) ([facility] [address] )     Migraines     Muscle spasm     Non-neoplastic nevus     Nontoxic multinodular goiter     OA (osteoarthritis)     Osteoarthritis     Other chronic pain     PAD (peripheral artery disease) ([facility])     Palpitations     Pseudogout     Spinal stenosis     Transient cerebral ischemia     Trigger ring finger     Viral gastroenteritis        Past Surgical History:   Procedure Laterality Date    APPENDECTOMY      ARTERIOGRAM  12/19/2017    CARDIAC CATHETERIZATION      CHOLECYSTECTOMY      COLONOSCOPY      CORONARY ARTERY BYPASS GRAFT  2004    LUMBAR LAMINECTOMY      MS ECHO TRANSESOPHAG R-T 2D W/PRB IMG ACQUISJ I&R N/A 4/3/2018    Procedure: TRANSESOPHAGEAL ECHOCARDIOGRAM (ROBB); Surgeon: Thomas Umanzor DO;  Location: [facility];  Service: Cardiac Surgery    MS REPLACE AORTIC VALVE OPENFEMORAL ARTERY APPROACH N/A 4/3/2018    Procedure: REPLACEMENT AORTIC VALVE TRANSCATHETER (TAVR) TRANSFEMORAL w/ 26MM IVY YEVGENIY S3 VALVE;  Surgeon: Thomas Umanzor DO;  Location: [facility];  Service: Cardiac Surgery    THYROIDECTOMY      TOTAL ABDOMINAL HYSTERECTOMY W/ BILATERAL SALPINGOOPHORECTOMY         Family History   Problem Relation Age of Onset    Cancer Mother     Stroke Mother     Cancer Father     Heart disease Father     Breast cancer Sister     Diabetes Daughter      Passed away January 2017      I have reviewed and agree with the history as documented  Social History   Substance Use Topics    Smoking status: Never Smoker    Smokeless tobacco: Never Used    Alcohol use No        Review of Systems   Unable to perform ROS: Acuity of condition   Gastrointestinal: Positive for vomiting  Physical Exam  ED Triage Vitals [04/28/18 2059]   Temperature Pulse Respirations Blood Pressure SpO2   98 3 °F (36 8 °C) (!) 112 22 (!) 187/93 96 %      Temp Source Heart Rate Source Patient Position - Orthostatic VS BP Location FiO2 (%)   Oral Monitor Sitting Right arm --      Pain Score       No Pain           Orthostatic Vital Signs  Vitals:    04/28/18 2110 04/28/18 2159 04/28/18 2215 04/28/18 2245   BP:  (!) 171/83 166/81 (!) 183/84   Pulse: 88 89 92 (!) 108   Patient Position - Orthostatic VS:  Lying Lying        Physical Exam   Constitutional: She is oriented to person, place, and time  She appears well-developed and well-nourished     HENT:   Head: Normocephalic and atraumatic  Right Ear: External ear normal    Left Ear: External ear normal    Nose: Nose normal    Mouth/Throat: Oropharynx is clear and moist    Eyes: Conjunctivae and EOM are normal  Pupils are equal, round, and reactive to light  Neck: Normal range of motion  Neck supple  No JVD present  No tracheal deviation present  No thyromegaly present  Cardiovascular: Normal rate, regular rhythm, normal heart sounds and intact distal pulses  Exam reveals no gallop and no friction rub  No murmur heard  Pulmonary/Chest: Effort normal and breath sounds normal  No stridor  No respiratory distress  She has no wheezes  She has no rales  She exhibits no tenderness  Abdominal: Soft  Bowel sounds are normal  She exhibits no distension and no mass  There is no tenderness  There is no rebound and no guarding  No hernia  Musculoskeletal: Normal range of motion  She exhibits no edema, tenderness or deformity  Lymphadenopathy:     She has no cervical adenopathy  Neurological: She is alert and oriented to person, place, and time  She has normal reflexes  She displays normal reflexes  No cranial nerve deficit  She exhibits normal muscle tone  Coordination normal    Skin: Skin is warm  No rash noted  No erythema  No pallor  Psychiatric: Cognition and memory are impaired  She exhibits abnormal recent memory  Nursing note and vitals reviewed        ED Medications  Medications   sodium chloride 0 9 % bolus 1,000 mL (1,000 mL Intravenous New Bag 4/28/18 2206)   metoclopramide (REGLAN) injection 10 mg (10 mg Intravenous Given 4/28/18 2127)       Diagnostic Studies  Results Reviewed     Procedure Component Value Units Date/Time    Protime-INR [[id number]]  (Abnormal) Collected:  04/28/18 2140    Lab Status:  Final result Specimen:  Blood from Arm, Right Updated:  04/28/18 2245     Protime 41 3 (H) seconds      INR 4 21 (H)    APTT [[id number]]  (Abnormal) Collected:  04/28/18 2140    Lab Status:  Final result Specimen:  Blood from Arm, Right Updated:  04/28/18 2245     PTT 51 (H) seconds     Ammonia [[id number]]  (Normal) Collected:  04/28/18 2218    Lab Status:  Final result Specimen:  Blood from Arm, Right Updated:  04/28/18 2242     Ammonia 26 umol/L     CBC and differential [[id number]]  (Abnormal) Collected:  04/28/18 2140    Lab Status:  Final result Specimen:  Blood from Arm, Right Updated:  04/28/18 2213     WBC 9 60 Thousand/uL      RBC 4 40 Million/uL      Hemoglobin 9 2 (L) g/dL      Hematocrit 30 1 (L) %      MCV 68 (L) fL      MCH 20 9 (L) pg      MCHC 30 6 (L) g/dL      RDW 18 7 (H) %      MPV 10 1 fL      Platelets 734 Thousands/uL      nRBC 0 /100 WBCs      Neutrophils Relative 74 %      Lymphocytes Relative 19 %      Monocytes Relative 6 %      Eosinophils Relative 1 %      Basophils Relative 0 %      Neutrophils Absolute 7 09 Thousands/µL      Lymphocytes Absolute 1 79 Thousands/µL      Monocytes Absolute 0 60 Thousand/µL      Eosinophils Absolute 0 06 Thousand/µL      Basophils Absolute 0 03 Thousands/µL     Comprehensive metabolic panel [[id number]]  (Abnormal) Collected:  04/28/18 2140    Lab Status:  Final result Specimen:  Blood from Arm, Right Updated:  04/28/18 2205     Sodium 140 mmol/L      Potassium 3 6 mmol/L      Chloride 107 mmol/L      CO2 21 mmol/L      Anion Gap 12 mmol/L      BUN 11 mg/dL      Creatinine 0 72 mg/dL      Glucose 122 mg/dL      Calcium 6 0 (L) mg/dL      AST 28 U/L      ALT 19 U/L      Alkaline Phosphatase 72 U/L      Total Protein 7 4 g/dL      Albumin 3 0 (L) g/dL      Total Bilirubin 0 83 mg/dL      eGFR 79 ml/min/1 73sq m     Narrative:         National Kidney Disease Education Program recommendations are as follows:  GFR calculation is accurate only with a steady state creatinine  Chronic Kidney disease less than 60 ml/min/1 73 sq  meters  Kidney failure less than 15 ml/min/1 73 sq  meters      Lipase [[id number]]  (Normal) Collected:  04/28/18 2140    Lab Status:  Final result Specimen: Blood from Arm, Right Updated:  04/28/18 2205     Lipase 112 u/L     POCT Chem 8+ [[id number]]  (Abnormal) Collected:  04/28/18 2118    Lab Status:  Final result Updated:  04/28/18 2122     SODIUM, I-STAT 142 mmol/l      Potassium, i-STAT 3 7 mmol/L      Chloride, istat 105 mmol/L      CO2, i-STAT 20 (L) mmol/L      Anion Gap, Istat 21 (H) mmol/L      Calcium, Ionized i-STAT 0 69 (LL) mmol/L      BUN, I-STAT 9 mg/dl      Creatinine, i-STAT 0 7 mg/dl      eGFR 82 ml/min/1 73sq m      Glucose, i-STAT 129 mg/dl      Hct, i-STAT 29 (L) %      Hgb, i-STAT 9 9 (L) g/dl      Specimen Type VENOUS                 CT head without contrast   Final Result by Medhat Coats DO (04/28 2147)      No acute intracranial abnormality  Workstation performed: [id number]               Procedures  Procedures      Phone Consults  ED Phone Contact    ED Course  ED Course as of Apr 28 2302   Sat Apr 28, 2018 2108 Pulse: (!) 112   2210 CALCIUM, IONIZED ISTAT: (!!) 0 69   2210 Calcium: (!) 6 0           Identification of Seniors at Risk      Most Recent Value   (ISAR) Identification of Seniors at Risk   Before the illness or injury that brought you to the Emergency, did you need someone to help you on a regular basis? 1 Filed at: 04/28/2018 2103   In the last 24 hours, have you needed more help than usual?  1 Filed at: 04/28/2018 2103   Have you been hospitalized for one or more nights during the past 6 months? 1 Filed at: 04/28/2018 2103   In general, do you see well?  0 Filed at: 04/28/2018 2103   In general, do you have serious problems with your memory? 0 Filed at: 04/28/2018 2103   Do you take more than three different medications every day?   1 Filed at: 04/28/2018 2103   ISAR Score  4 Filed at: 04/28/2018 2103        NIH Stroke Scale      Most Recent Value   Level of Consciousness (1a )  0 Filed at: 04/28/2018 2108   LOC Questions (1b )  2 Filed at: 04/28/2018 2108   LOC Commands (1c )  1 Filed at: 04/28/2018 2108   Best Gaze (2 )  0 Filed at: 04/28/2018 2108   Visual (3 )  0 Filed at: 04/28/2018 2108   Facial Palsy (4 )  0 Filed at: 04/28/2018 2108   Motor Arm, Left (5a )  0 Filed at: 04/28/2018 2108   Motor Arm, Right (5b )  0 Filed at: 04/28/2018 2108   Motor Leg, Left (6a )  0 Filed at: 04/28/2018 2108   Motor Leg, Right (6b )  0 Filed at: 04/28/2018 2108   Limb Ataxia (7 )  0 Filed at: 04/28/2018 2108   Sensory (8 )  0 Filed at: 04/28/2018 2108   Best Language (9 )  2 Filed at: 04/28/2018 2108   Dysarthria (10 )  2 Filed at: 04/28/2018 2108   Extinction and Inattention (11 ) (Formerly Neglect)  0 Filed at: 04/28/2018 2108   Total  7 Filed at: 04/28/2018 2108                        MDM  Number of Diagnoses or Management Options  Altered mental status: new and requires workup  Intractable vomiting: new and requires workup     Amount and/or Complexity of Data Reviewed  Clinical lab tests: ordered and reviewed  Tests in the radiology section of CPT®: ordered and reviewed  Tests in the medicine section of CPT®: reviewed and ordered  Decide to obtain previous medical records or to obtain history from someone other than the patient: yes  Independent visualization of images, tracings, or specimens: yes    Patient Progress  Patient progress: stable    CritCare Time    Disposition  Final diagnoses: Altered mental status   Intractable vomiting     Time reflects when diagnosis was documented in both MDM as applicable and the Disposition within this note     Time User Action Codes Description Comment    4/28/2018 10:36 PM Queenie Coon Add [R41 82] Altered mental status     4/28/2018 10:36 PM Queenie Coon Add [R11 10] Intractable vomiting       ED Disposition     ED Disposition Condition Comment    Admit  Case was discussed with Nadeen and the patient's admission status was agreed to be Admission Status: observation status to the service of Dr Jaron Fiore           Follow-up Information    None       Patient's Medications   Discharge Prescriptions    No medications on file     No discharge procedures on file  ED Provider  Attending physically available and evaluated Lavinia Ken JONES managed the patient along with the ED Attending      Electronically Signed by         Ben Dunbar DO  04/28/18 4131

## 2018-04-29 NOTE — PROGRESS NOTES
Pt  Alert to self, no c/o pain, no acute distress noted during the morning  Pt  Assisted with breakfast and transfer from the chair to bed for comfort  During morning rounds while being assessed by MD pt  Had seizure like activity and became unresponsive   Rapid respond was called

## 2018-04-29 NOTE — PROCEDURES
Central Line Insertion  Date/Time: 4/29/2018 2:57 PM  Performed by: Sonya Hernandez by: Norma Gilliam     Patient location:  Bedside  Other Assisting Provider: No    Consent:     Consent obtained:  Verbal (brotheamon clifford)    Consent given by: brother Rene Rich, decision maker  Risks discussed:  Arterial puncture, bleeding, infection, incorrect placement, pneumothorax and nerve damage    Alternatives discussed:  Delayed treatment  Universal protocol:     Procedure explained and questions answered to patient or proxy's satisfaction: yes      Relevant documents present and verified: yes      Test results available and properly labeled: yes      Imaging studies available: yes      Required blood products, implants, devices, and special equipment available: yes      Site/side marked: yes      Immediately prior to procedure, a time out was called: yes      Patient identity confirmed:  Arm band  Pre-procedure details:     Hand hygiene: Hand hygiene performed prior to insertion      Sterile barrier technique: All elements of maximal sterile technique followed      Skin preparation:  2% chlorhexidine and ChloraPrep    Skin preparation agent: Skin preparation agent completely dried prior to procedure    Indications:     Central line indications: hemodynamic monitoring and no peripheral vascular access    Sedation:     Sedation type: fentanyl  Anesthesia (see MAR for exact dosages):      Anesthesia method:  Local infiltration    Local anesthetic:  Lidocaine 1% WITH epi  Procedure details:     Location:  Right internal jugular    Vessel type: vein      Laterality:  Right    Approach: percutaneous technique used      Patient position:  Flat    Catheter type:  Triple lumen 16cm    Catheter size:  7 Fr    Landmarks identified: yes      Ultrasound guidance: yes      Sterile ultrasound techniques: Sterile gel and sterile probe covers were used      Number of attempts:  1    Successful placement: yes    Post-procedure details:     Post-procedure:  Dressing applied and line sutured    Assessment:  Blood return through all ports, free fluid flow, no pneumothorax on x-ray and placement verified by x-ray    Post-procedure complications: none      Patient tolerance of procedure:   Tolerated well, no immediate complications

## 2018-04-30 PROBLEM — I27.20 PULMONARY HYPERTENSION (HCC): Status: ACTIVE | Noted: 2018-04-30

## 2018-04-30 PROBLEM — D64.9 ANEMIA: Status: ACTIVE | Noted: 2018-04-30

## 2018-04-30 PROBLEM — G93.40 ENCEPHALOPATHY ACUTE: Status: RESOLVED | Noted: 2018-04-28 | Resolved: 2018-04-30

## 2018-04-30 PROBLEM — J96.01 ACUTE HYPOXEMIC RESPIRATORY FAILURE (HCC): Status: RESOLVED | Noted: 2018-03-16 | Resolved: 2018-04-30

## 2018-04-30 LAB
ANION GAP SERPL CALCULATED.3IONS-SCNC: 9 MMOL/L (ref 4–13)
APTT PPP: 47 SECONDS (ref 23–35)
BASOPHILS # BLD AUTO: 0.02 THOUSANDS/ΜL (ref 0–0.1)
BASOPHILS NFR BLD AUTO: 0 % (ref 0–1)
BLD SMEAR INTERP: NORMAL
BUN SERPL-MCNC: 17 MG/DL (ref 5–25)
CALCIUM SERPL-MCNC: 6 MG/DL (ref 8.3–10.1)
CHLORIDE SERPL-SCNC: 109 MMOL/L (ref 100–108)
CO2 SERPL-SCNC: 24 MMOL/L (ref 21–32)
CREAT SERPL-MCNC: 0.98 MG/DL (ref 0.6–1.3)
EOSINOPHIL # BLD AUTO: 0.22 THOUSAND/ΜL (ref 0–0.61)
EOSINOPHIL NFR BLD AUTO: 3 % (ref 0–6)
ERYTHROCYTE [DISTWIDTH] IN BLOOD BY AUTOMATED COUNT: 19.1 % (ref 11.6–15.1)
ERYTHROCYTE [DISTWIDTH] IN BLOOD BY AUTOMATED COUNT: 19.1 % (ref 11.6–15.1)
GFR SERPL CREATININE-BSD FRML MDRD: 55 ML/MIN/1.73SQ M
GLUCOSE SERPL-MCNC: 115 MG/DL (ref 65–140)
GLUCOSE SERPL-MCNC: 129 MG/DL (ref 65–140)
GLUCOSE SERPL-MCNC: 130 MG/DL (ref 65–140)
GLUCOSE SERPL-MCNC: 143 MG/DL (ref 65–140)
GLUCOSE SERPL-MCNC: 153 MG/DL (ref 65–140)
GLUCOSE SERPL-MCNC: 164 MG/DL (ref 65–140)
GLUCOSE SERPL-MCNC: 165 MG/DL (ref 65–140)
GLUCOSE SERPL-MCNC: 180 MG/DL (ref 65–140)
GLUCOSE SERPL-MCNC: 185 MG/DL (ref 65–140)
GLUCOSE SERPL-MCNC: 186 MG/DL (ref 65–140)
HCT VFR BLD AUTO: 24.7 % (ref 34.8–46.1)
HCT VFR BLD AUTO: 24.7 % (ref 34.8–46.1)
HCT VFR BLD AUTO: 26.1 % (ref 34.8–46.1)
HGB BLD-MCNC: 7.5 G/DL (ref 11.5–15.4)
HGB BLD-MCNC: 7.5 G/DL (ref 11.5–15.4)
HGB BLD-MCNC: 8.1 G/DL (ref 11.5–15.4)
INR PPP: 3.23 (ref 0.86–1.16)
LYMPHOCYTES # BLD AUTO: 1.07 THOUSANDS/ΜL (ref 0.6–4.47)
LYMPHOCYTES NFR BLD AUTO: 13 % (ref 14–44)
MAGNESIUM SERPL-MCNC: 2 MG/DL (ref 1.6–2.6)
MCH RBC QN AUTO: 20.6 PG (ref 26.8–34.3)
MCH RBC QN AUTO: 20.6 PG (ref 26.8–34.3)
MCHC RBC AUTO-ENTMCNC: 30.4 G/DL (ref 31.4–37.4)
MCHC RBC AUTO-ENTMCNC: 30.4 G/DL (ref 31.4–37.4)
MCV RBC AUTO: 68 FL (ref 82–98)
MCV RBC AUTO: 68 FL (ref 82–98)
MONOCYTES # BLD AUTO: 0.57 THOUSAND/ΜL (ref 0.17–1.22)
MONOCYTES NFR BLD AUTO: 7 % (ref 4–12)
NEUTROPHILS # BLD AUTO: 6.33 THOUSANDS/ΜL (ref 1.85–7.62)
NEUTS SEG NFR BLD AUTO: 77 % (ref 43–75)
NRBC BLD AUTO-RTO: 0 /100 WBCS
PHOSPHATE SERPL-MCNC: 3.1 MG/DL (ref 2.3–4.1)
PLATELET # BLD AUTO: 210 THOUSANDS/UL (ref 149–390)
PLATELET # BLD AUTO: 210 THOUSANDS/UL (ref 149–390)
PMV BLD AUTO: 9.6 FL (ref 8.9–12.7)
PMV BLD AUTO: 9.6 FL (ref 8.9–12.7)
POTASSIUM SERPL-SCNC: 3.2 MMOL/L (ref 3.5–5.3)
PROTHROMBIN TIME: 33.5 SECONDS (ref 12.1–14.4)
RBC # BLD AUTO: 3.64 MILLION/UL (ref 3.81–5.12)
RBC # BLD AUTO: 3.64 MILLION/UL (ref 3.81–5.12)
SODIUM SERPL-SCNC: 142 MMOL/L (ref 136–145)
WBC # BLD AUTO: 8.24 THOUSAND/UL (ref 4.31–10.16)
WBC # BLD AUTO: 8.24 THOUSAND/UL (ref 4.31–10.16)

## 2018-04-30 PROCEDURE — 82948 REAGENT STRIP/BLOOD GLUCOSE: CPT

## 2018-04-30 PROCEDURE — 85610 PROTHROMBIN TIME: CPT | Performed by: INTERNAL MEDICINE

## 2018-04-30 PROCEDURE — 93005 ELECTROCARDIOGRAM TRACING: CPT

## 2018-04-30 PROCEDURE — 85018 HEMOGLOBIN: CPT | Performed by: STUDENT IN AN ORGANIZED HEALTH CARE EDUCATION/TRAINING PROGRAM

## 2018-04-30 PROCEDURE — 83735 ASSAY OF MAGNESIUM: CPT | Performed by: PHYSICIAN ASSISTANT

## 2018-04-30 PROCEDURE — 85014 HEMATOCRIT: CPT | Performed by: STUDENT IN AN ORGANIZED HEALTH CARE EDUCATION/TRAINING PROGRAM

## 2018-04-30 PROCEDURE — 85027 COMPLETE CBC AUTOMATED: CPT | Performed by: PHYSICIAN ASSISTANT

## 2018-04-30 PROCEDURE — 99233 SBSQ HOSP IP/OBS HIGH 50: CPT | Performed by: PSYCHIATRY & NEUROLOGY

## 2018-04-30 PROCEDURE — 80048 BASIC METABOLIC PNL TOTAL CA: CPT | Performed by: PHYSICIAN ASSISTANT

## 2018-04-30 PROCEDURE — 94760 N-INVAS EAR/PLS OXIMETRY 1: CPT

## 2018-04-30 PROCEDURE — 94003 VENT MGMT INPAT SUBQ DAY: CPT

## 2018-04-30 PROCEDURE — 85025 COMPLETE CBC W/AUTO DIFF WBC: CPT | Performed by: PHYSICIAN ASSISTANT

## 2018-04-30 PROCEDURE — 85730 THROMBOPLASTIN TIME PARTIAL: CPT | Performed by: INTERNAL MEDICINE

## 2018-04-30 PROCEDURE — 92610 EVALUATE SWALLOWING FUNCTION: CPT

## 2018-04-30 PROCEDURE — 99233 SBSQ HOSP IP/OBS HIGH 50: CPT | Performed by: ANESTHESIOLOGY

## 2018-04-30 PROCEDURE — 84100 ASSAY OF PHOSPHORUS: CPT | Performed by: PHYSICIAN ASSISTANT

## 2018-04-30 RX ORDER — POTASSIUM CHLORIDE 29.8 MG/ML
40 INJECTION INTRAVENOUS ONCE
Status: COMPLETED | OUTPATIENT
Start: 2018-04-30 | End: 2018-04-30

## 2018-04-30 RX ORDER — MAGNESIUM SULFATE HEPTAHYDRATE 40 MG/ML
2 INJECTION, SOLUTION INTRAVENOUS ONCE
Status: DISCONTINUED | OUTPATIENT
Start: 2018-04-30 | End: 2018-04-30

## 2018-04-30 RX ORDER — SODIUM CHLORIDE, SODIUM GLUCONATE, SODIUM ACETATE, POTASSIUM CHLORIDE, MAGNESIUM CHLORIDE, SODIUM PHOSPHATE, DIBASIC, AND POTASSIUM PHOSPHATE .53; .5; .37; .037; .03; .012; .00082 G/100ML; G/100ML; G/100ML; G/100ML; G/100ML; G/100ML; G/100ML
500 INJECTION, SOLUTION INTRAVENOUS ONCE
Status: COMPLETED | OUTPATIENT
Start: 2018-04-30 | End: 2018-04-30

## 2018-04-30 RX ORDER — POTASSIUM CHLORIDE 20MEQ/15ML
40 LIQUID (ML) ORAL ONCE
Status: COMPLETED | OUTPATIENT
Start: 2018-04-30 | End: 2018-04-30

## 2018-04-30 RX ORDER — POTASSIUM CHLORIDE 29.8 MG/ML
40 INJECTION INTRAVENOUS ONCE
Status: DISCONTINUED | OUTPATIENT
Start: 2018-04-30 | End: 2018-04-30

## 2018-04-30 RX ORDER — LIDOCAINE HYDROCHLORIDE 10 MG/ML
5 INJECTION, SOLUTION EPIDURAL; INFILTRATION; INTRACAUDAL; PERINEURAL ONCE
Status: DISCONTINUED | OUTPATIENT
Start: 2018-04-30 | End: 2018-04-30

## 2018-04-30 RX ORDER — SODIUM CHLORIDE, SODIUM GLUCONATE, SODIUM ACETATE, POTASSIUM CHLORIDE, MAGNESIUM CHLORIDE, SODIUM PHOSPHATE, DIBASIC, AND POTASSIUM PHOSPHATE .53; .5; .37; .037; .03; .012; .00082 G/100ML; G/100ML; G/100ML; G/100ML; G/100ML; G/100ML; G/100ML
500 INJECTION, SOLUTION INTRAVENOUS ONCE
Status: DISCONTINUED | OUTPATIENT
Start: 2018-04-30 | End: 2018-04-30

## 2018-04-30 RX ORDER — POLYETHYLENE GLYCOL 3350 17 G/17G
17 POWDER, FOR SOLUTION ORAL ONCE
Status: DISCONTINUED | OUTPATIENT
Start: 2018-04-30 | End: 2018-04-30

## 2018-04-30 RX ORDER — POLYETHYLENE GLYCOL 3350 17 G/17G
17 POWDER, FOR SOLUTION ORAL DAILY PRN
Status: DISCONTINUED | OUTPATIENT
Start: 2018-04-30 | End: 2018-05-07 | Stop reason: HOSPADM

## 2018-04-30 RX ORDER — POTASSIUM CHLORIDE 20MEQ/15ML
40 LIQUID (ML) ORAL ONCE
Status: DISCONTINUED | OUTPATIENT
Start: 2018-04-30 | End: 2018-04-30

## 2018-04-30 RX ADMIN — NYSTATIN: 100000 POWDER TOPICAL at 17:50

## 2018-04-30 RX ADMIN — FLUTICASONE PROPIONATE 1 PUFF: 110 AEROSOL, METERED RESPIRATORY (INHALATION) at 22:00

## 2018-04-30 RX ADMIN — ACETAMINOPHEN 650 MG: 325 TABLET, FILM COATED ORAL at 09:49

## 2018-04-30 RX ADMIN — LEVETIRACETAM 750 MG: 100 INJECTION, SOLUTION INTRAVENOUS at 21:42

## 2018-04-30 RX ADMIN — CHLORHEXIDINE GLUCONATE 15 ML: 1.2 RINSE ORAL at 09:44

## 2018-04-30 RX ADMIN — LEVOTHYROXINE SODIUM 100 MCG: 100 TABLET ORAL at 05:45

## 2018-04-30 RX ADMIN — POTASSIUM CHLORIDE 40 MEQ: 20 SOLUTION ORAL at 11:48

## 2018-04-30 RX ADMIN — LABETALOL 20 MG/4 ML (5 MG/ML) INTRAVENOUS SYRINGE 10 MG: at 21:38

## 2018-04-30 RX ADMIN — POTASSIUM CHLORIDE 40 MEQ: 400 INJECTION, SOLUTION INTRAVENOUS at 11:48

## 2018-04-30 RX ADMIN — VITAMIN D, TAB 1000IU (100/BT) 1000 UNITS: 25 TAB at 09:48

## 2018-04-30 RX ADMIN — NYSTATIN: 100000 POWDER TOPICAL at 09:42

## 2018-04-30 RX ADMIN — LORATADINE 10 MG: 10 TABLET ORAL at 09:48

## 2018-04-30 RX ADMIN — INSULIN LISPRO 1 UNITS: 100 INJECTION, SOLUTION INTRAVENOUS; SUBCUTANEOUS at 23:26

## 2018-04-30 RX ADMIN — METOPROLOL TARTRATE 50 MG: 50 TABLET ORAL at 09:48

## 2018-04-30 RX ADMIN — CALCIUM GLUCONATE 2 G: 98 INJECTION, SOLUTION INTRAVENOUS at 12:41

## 2018-04-30 RX ADMIN — METOPROLOL TARTRATE 50 MG: 50 TABLET ORAL at 21:38

## 2018-04-30 RX ADMIN — LEVETIRACETAM 1000 MG: 100 INJECTION, SOLUTION INTRAVENOUS at 09:42

## 2018-04-30 RX ADMIN — Medication 20 MG: at 09:48

## 2018-04-30 RX ADMIN — SODIUM CHLORIDE, SODIUM GLUCONATE, SODIUM ACETATE, POTASSIUM CHLORIDE, MAGNESIUM CHLORIDE, SODIUM PHOSPHATE, DIBASIC, AND POTASSIUM PHOSPHATE 500 ML: .53; .5; .37; .037; .03; .012; .00082 INJECTION, SOLUTION INTRAVENOUS at 00:51

## 2018-04-30 RX ADMIN — FENTANYL CITRATE 50 MCG/HR: 50 INJECTION, SOLUTION INTRAMUSCULAR; INTRAVENOUS at 11:12

## 2018-04-30 RX ADMIN — ATORVASTATIN CALCIUM 80 MG: 80 TABLET, FILM COATED ORAL at 17:02

## 2018-04-30 RX ADMIN — MAGNESIUM SULFATE HEPTAHYDRATE 2 G: 40 INJECTION, SOLUTION INTRAVENOUS at 00:24

## 2018-04-30 NOTE — PROGRESS NOTES
Progress Note - Critical Care   Angélica Grover [de-identified] y o  female MRN: [id number]  Unit/Bed#: ICU 03 Encounter: [id number]    Attending Physician: Piotr Clemente MD      ______________________________________________________________________  Assessment and Plan:   Principal Problem:    Encephalopathy acute  Active Problems:    Essential hypertension    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 native heart with angina pectoris ([facility] )    Type 2 diabetes mellitus with complication, with long-term current use of insulin (HCC)    Atrial flutter (HCC)    Hyperlipidemia    Gastroesophageal reflux disease without esophagitis    Acute respiratory failure with hypoxia (HCC)    Asthma    Asymptomatic carotid artery stenosis, bilateral    Atherosclerosis of artery of extremity with ulceration (HCC)    Hx of CABG    Chronic anticoagulation    Chronic systolic heart failure (HCC)    Postoperative hypothyroidism    Paroxysmal atrial fibrillation (HCC)    Expressive aphasia    Multiple lacunar infarcts (HCC)    Vomiting    History of CVA (cerebrovascular accident)    Seizure ([facility] )    Supratherapeutic INR    PVC (premature ventricular contraction)  Resolved Problems:    * No resolved hospital problems  *        Neuro:   -Seizure, new onset, unclear source but possibly secondary to recent stroke   -Keppra 2g load yesterday, now 1g Q 12 H   -EMU in place -- no further seizure activity noted   -MRI after 24H EMU complete  -Recent stroke, thought to be cardioembolic  Presented with aphasia  -MRI 4/17 showed 4 punctate restriction indicative of scattered neal acute/recent lacunar infarction in 4 distinct vascular territories (R posterior cerebral, right MCA, L frontal lobe anterior and inferior)   -Intubated for airway protection   -Sedation: Fentanyl 50 mcg/hour  Following commands     -APAP ordered prn, none given      CV:   -TAVR earlier this month  -Longstanding hx of atrial fibrillation/atrial flutter on Coumadin  Recently changed to Xarelto for suspected Coumadin failure, as INR was therapeutic when she had her recent stroke  -ROBB during last admission showed normal EF, no PFO, mild-moderate MS, moderate to severe TR, moderate PA HTN  -Initially very hypertensive during rapid response yesterday  SBP range 110-216 documented  -On metoprolol 50mg po BID (100mg daily at home)  -Lipitor 80mg daily  -Labetalol prn, none given since admission to ICU  -Soft BP overnight, MAP currently 71  -See heme below      Pulm:   -Intubated yesterday for airway protection  -AC/VC 14/400/35%/5  -More awake today, consider extubation  -CXR yesterday showed no acute pulmonary disease  -Flovent Q 12H  -albuterol prn, none given      GI:   -Initial presenting complaint was N/V  -Reassess after extubation  -Possible side effect of Xarelto?  -Reglan prn, given yesterday prior to intubation  -Bowel regimen of Senokot QHS, add prn Miralax      :   -Soft UO overnight at 0 4cc/kg/hour  -2993 in, 750 out  -BUN/Cr 17/0 98  -Consider a fluid bolus in light of her soft BP as well      F/E/N:     Fluids: None other than TF currently    Electrolytes:  Na 142  K 3 2  Cl 109  Ca 6 0  Mg 2 0    Ca has been low since admission  Last got 2g of calcium gluconate yesterday afternoon  Corrected for albumin is still 6 9  Will repeat calcium gluconate 2g  Mg was also very low at 0 5 yesterday requiring 4g of mag sulfate, plus an additional 2g overnight  Nutrition: Jevity 1 2 at 57/hour    ID:   -no concerns at this time  -influenza ruled out  -tmax 99 3  -WBC pending    Heme:   -Had a cardioembolic CVA on therapeutic Coumadin  On admission yesterday, she had not been taking Coumadin for 2 weeks (Xarelto only) yet INR was 4  On admission to ICU, INR was 10, then 6  Today's is pending  LFTs normal, but has not been eating well lately   Malnutrition?  -No chemical VTE ppx 2/2 above  -SCDs    Endo:   -Hx of DM  -On Humulin 1unit/hour  -Transiently hypoglycemic yesterday (75)  -POCT glucose  -Hypothyroid: levothryoxine      Msk/Skin:   -Reposition to prevent ulcer    Disposition: ICU    Code Status: Level 1 - Full Code    Counseling / Coordination of Care  Total Critical Care time spent 35 minutes excluding procedures, teaching and family updates  ______________________________________________________________________    Chief Complaint: Unable to offer complaint    24 Hour Events: Pt received to the ICU after rapid response was called on med [facility] floor for seizure-like activity  CT head was non-acute  Pt intubated for airway protection  EMU initiated     ______________________________________________________________________    Physical Exam:     RASS: -1    Constitutional: Sleeping, wakes easily, follows commands    HENT: NCAT, PERRL, ETT in place    CV: RRR, SR on tele, no murmur    Pulm: CTAB anterior    GI:soft, NT    : deferred    MS: no edema    Neuro: GCS 10T, opens eyes to voice, L more than R, follows commands in all exteremities    Skin:          ______________________________________________________________________  Vitals:    18 0400 18 0407 18 0500 18 0600   BP:       BP Location:       Pulse: 66  62 70   Resp: 14  14 16   Temp: 98 6 °F (37 °C)      TempSrc: Oral      SpO2: 98% 97% 99% 98%   Weight:       Height:           Temperature:   Temp (24hrs), Av 8 °F (37 1 °C), Min:97 9 °F (36 6 °C), Max:99 3 °F (37 4 °C)    Current Temperature: 98 6 °F (37 °C)  Weights:   IBW: 54 7 kg    Body mass index is 27 81 kg/m²    Weight (last 2 days)     Date/Time   Weight    18 2300  73 5 (162)            Hemodynamic Monitoring:  N/A     Non-Invasive/Invasive Ventilation Settings:  Respiratory    Lab Data (Last 4 hours)    None         O2/Vent Data (Last 4 hours)       0407           Vent Mode AC/VC       Resp Rate (BPM) (BPM) 14       Vt (mL) (mL) 400       FIO2 (%) (%) 35       PEEP (cmH2O) (cmH2O) 5       MV 6 05 Lab Results   Component Value Date    PHART 7 452 (H) 04/29/2018    [id number] 30 4 (L) 04/29/2018    PO2ART 128 6 04/29/2018    [id number] 20 7 (L) 04/29/2018    BEART -2 6 04/29/2018    SOURCE Line, Arterial 04/29/2018     SpO2: SpO2: 98 %  Intake and Outputs:  I/O       04/28 0701 - 04/29 0700 04/29 0701 - 04/30 0700    I V  (mL/kg)  2045 2 (27 8)    NG/GT  598    IV Piggyback  646 7    Total Intake(mL/kg)  3289 9 (44 8)    Urine (mL/kg/hr)  875 (0 5)    Stool 50     Total Output 50 875    Net -50 +2414 9              UOP: 0 4cc/kg/hour   Nutrition:        Diet Orders            Start     Ordered    04/29/18 1526  Diet Enteral/Parenteral; Tube Feeding No Oral Diet; Jevity 1 2 Wesly; 20  Diet effective now     Comments:  Increase by 10mL every 4 hours until at goal of 53mL/hour   Question Answer Comment   Diet Type Enteral/Parenteral    Enteral/Parenteral Tube Feeding No Oral Diet    Tube Feeding Formula: Jevity 1 2 Wesly    Tube Feeding Continuous rate (mL/hr): 20    RD to adjust diet per protocol? Yes        04/29/18 1525        TF currently running at 57mL/hour with a goal of 57mL hour  Formula:Jevity 1 2  Labs:     Results from last 7 days  Lab Units 04/29/18  1028 04/29/18  1025 04/28/18  2140  04/28/18  0913   WBC Thousand/uL 15 93*  --  9 60  --  9 30   HEMOGLOBIN g/dL 9 9*  --  9 2*  --  9 5*   I STAT HEMOGLOBIN g/dl  --  10 5*  --   < >  --    HEMATOCRIT % 33 4*  --  30 1*  --  30 7*   PLATELETS Thousands/uL 359  --  309  --  315   NEUTROS PCT %  --   --  74  --  71   MONOS PCT %  --   --  6  --  6   < > = values in this interval not displayed      Results from last 7 days  Lab Units 04/30/18  0540 04/29/18  2039 04/29/18  1538 04/29/18  1029  04/29/18  0450 04/28/18  2140   SODIUM mmol/L 142 143 143 141  --  139 140   POTASSIUM mmol/L 3 2* 3 8 3 5 3 7  --  3 5 3 6   CHLORIDE mmol/L 109* 109* 110* 106  --  108 107   CO2 mmol/L 24 24 23 11*  --  15* 21   BUN mg/dL 17 15 15 14  --  12 11   CREATININE mg/dL 0 98 0  94 0 73 1 13  --  0 82 0 72   CALCIUM mg/dL 6 0* 6 2* 5 9* 6 3*  --  5 9* 6 0*   TOTAL PROTEIN g/dL  --   --   --  7 5  --  7 3 7 4   BILIRUBIN TOTAL mg/dL  --   --   --  0 83  --  0 81 0 83   ALK PHOS U/L  --   --   --  73  --  70 72   ALT U/L  --   --   --  21  --  19 19   AST U/L  --   --   --  43  --  30 28   GLUCOSE RANDOM mg/dL 153* 124 84 204*  --  132 122   GLUCOSE, ISTAT   --   --   --   --   < >  --   --    < > = values in this interval not displayed  Results from last 7 days  Lab Units 04/30/18  0540 04/29/18  2212 04/29/18  2039   MAGNESIUM mg/dL 2 0 1 6 1 8     Lab Results   Component Value Date    PHOS 3 1 04/30/2018    PHOS 4 5 (H) 03/16/2018    PHOS 5 2 (H) 12/19/2017        Results from last 7 days  Lab Units 04/29/18  1538 04/29/18  1028 04/29/18  0120 04/28/18  2140   INR  6 94* 10 50* 3 75* 4 21*   PTT seconds 53* 62*  --  51*       0  Lab Value Date/Time   TROPONINI 0 03 04/28/2018 0913   TROPONINI 0 06 (H) 04/16/2018 1326   TROPONINI 2 02 (H) 03/16/2018 0508   TROPONINI 2 31 (H) 03/16/2018 0208   TROPONINI 2 20 (H) 03/15/2018 2302   TROPONINI 1 98 (H) 03/15/2018 1938   TROPONINI 1 61 (H) 03/15/2018 1629   TROPONINI 0 56 (H) 03/15/2018 1331   TROPONINI 2 38 (H) 12/20/2017 0806   TROPONINI 2 85 (H) 12/20/2017 0537   TROPONINI 2 88 (H) 12/20/2017 0537   TROPONINI 1 34 (H) 12/19/2017 2141   TROPONINI 0 43 (H) 12/19/2017 1655   TROPONINI 0 02 12/19/2017 1424       Results from last 7 days  Lab Units 04/29/18  1538 04/29/18  1417 04/29/18  0926   LACTIC ACID mmol/L 1 0 1 2 1 8     ABG:  Lab Results   Component Value Date    PHART 7 452 (H) 04/29/2018    [id number] 30 4 (L) 04/29/2018    PO2ART 128 6 04/29/2018    [id number] 20 7 (L) 04/29/2018    BEART -2 6 04/29/2018    SOURCE Line, Arterial 04/29/2018     Imaging: None new I have personally reviewed pertinent reports  EKG: None new  Micro:  No results found for: Arlette Stade, SPUTUMCULTUR  Allergies:    Allergies   Allergen Reactions    Other Chest Pain     IVP-listed as "chest pain" in previous chart, patient stated this occurred "a long time ago" but does not remember exactly occurred     Cephalosporins Chest Pain    Contrast [Iodinated Diagnostic Agents] Other (See Comments)     Flash pulm edema    Doxycycline Chest Pain    Levaquin [Levofloxacin] Chest Pain    Ondansetron Chest Pain     Prolonged QT    Toradol [Ketorolac Tromethamine] Chest Pain     Medications:   Scheduled Meds:  Current Facility-Administered Medications:  acetaminophen 650 mg Oral Q6H PRN Eyal Tyler DO    albuterol 2 puff Inhalation Q6H PRN Ai Turner MD    atorvastatin 80 mg Oral Daily With Dinner Ai Turner MD    chlorhexidine 15 mL Swish & Spit Q12H [address] Chapincito Marr DO    cholecalciferol 1,000 Units Oral Daily Ai Turner MD    dextrose 25 mL Intravenous Once Ashlie James PA-C    fentaNYL 50 mcg/hr Intravenous Continuous Viridiana Estevez, DO Last Rate: 50 mcg/hr (04/29/18 1136)   fluticasone 1 puff Inhalation Q12H [address] Vilma Bolaños MD    insulin regular (HumuLIN R,NovoLIN R) infusion 0 3-21 Units/hr Intravenous Titrated Viridiana Estevez, DO Last Rate: 1 Units/hr (04/30/18 0402)   labetalol 10 mg Intravenous Q6H PRN Viridiana Estevez, DO    levETIRAcetam 1,000 mg Intravenous Q12H [address] Viridiana Estevez, DO Last Rate: Stopped (04/29/18 2230)   levothyroxine 100 mcg Oral Daily Vilma Bolaños MD    loratadine 10 mg Oral Daily Vilma Bolaños MD    LORazepam 2 mg Intravenous PRN Viridiana Estevez, DO    metoclopramide 10 mg Intravenous Q6H PRN Ai Turner MD    metoprolol tartrate 50 mg Oral Q12H [address] Glenn Baker MD    nystatin  Topical BID Viridiana Estevez, DO    omeprazole (PRILOSEC) suspension 2 mg/mL 20 mg Oral Daily Viridiana Estevez, DO    senna-docusate sodium 1 tablet Oral HS Ashlie James PA-C      Continuous Infusions:  fentaNYL 50 mcg/hr Last Rate: 50 mcg/hr (04/29/18 1136)   insulin regular (HumuLIN R,NovoLIN R) infusion 0 3-21 Units/hr Last Rate: 1 Units/hr (04/30/18 0402)     PRN Meds:    acetaminophen 650 mg Q6H PRN   albuterol 2 puff Q6H PRN   labetalol 10 mg Q6H PRN   LORazepam 2 mg PRN   metoclopramide 10 mg Q6H PRN     VTE Pharmacologic Prophylaxis: Pharmacologic VTE Prophylaxis contraindicated due to supratherapeutic INR  VTE Mechanical Prophylaxis: sequential compression device  Invasive lines and devices: Invasive Devices     Central Venous Catheter Line            CVC Central Lines 04/29/18 Triple 16cm less than 1 day          Peripheral Intravenous Line            Peripheral IV 04/29/18 Right Arm 1 day          Arterial Line            Arterial Line 04/29/18 Right Radial less than 1 day          Drain            NG/OG/Enteral Tube less than 1 day    Urethral Catheter less than 1 day          Airway            ETT  Hi-Lo; Cuffed 7 5 mm less than 1 day                     Portions of the record may have been created with voice recognition software  Occasional wrong word or "sound a like" substitutions may have occurred due to the inherent limitations of voice recognition software  Read the chart carefully and recognize, using context, where substitutions have occurred      Ty MAGDALENO Mejia

## 2018-04-30 NOTE — NUTRITION
Recommend CCD1, Cardiac Step 1 diet when safely advanced to solids  Swallow eval pending  If unable to advance PO diet in next 24 hrs then consider an alternate means of nutrition support and reconsult RD for recs

## 2018-04-30 NOTE — RESPIRATORY THERAPY NOTE
RT Ventilator Management Note  Chase Hubbard [de-identified] y o  female MRN: [id number]  Unit/Bed#: ICU 03 Encounter: [id number]      Daily Screen       4/29/2018 1100             Patient safety screen outcome[de-identified] Failed    Not Ready for Weaning due to[de-identified] Poor inspiratory effort            Physical Exam:   Assessment Type: Assess only  General Appearance: Sedated  Respiratory Pattern: Assisted  Chest Assessment: Chest expansion symmetrical  Bilateral Breath Sounds: Diminished, Clear  R Breath Sounds: Clear  L Breath Sounds: Clear  Cough: None  Suction: ET Tube  O2 Device: ventilator      Resp Comments: Pt continues on current [address] settings  No changes made overnight  Will continue to monitor

## 2018-04-30 NOTE — PROGRESS NOTES
Progress Note - Neurology   [facility] [address] [facility] y o  female MRN: [id number]  Unit/Bed#: ICU 03 Encounter: [id number]    Assessment:  22-year-old right-handed female who was admitted to Medicine for altered mental status and was found to have a CHF exacerbation, yesterday she had a witnessed tonic-clonic seizure, she has prior history  Of stroke - recent admission for embolic pattern stroke with hx of atrial fibrillation  Inr was therapeutic at that time, switched to Xarelto  Ct of head this admission no intracranial abnormality  Plan:  -  MRI of brain when able  -  Continue EEG monitoring - no seizure seen (slow with Firda reported by epilepsy team)  -  Loaded with Keppra, now on Keppra 1000 mg every 12 hours, was also given ativan  -  Secondary stroke modification - Xarelto when able  -  Seizure precautions will see once again when extubated and more awake and more or a  participant with neurological examination    -  Notify neurology with any questions and concerns    Subjective:   No events overnight, per nursing at bedside, weaning protocol in place  Epilepsy no seizure overnight  Reviewed prior records at morning rounds  Reviewed with critical care, the patient was taking her xarelto as prescribed, coumadin was discontinued at last visit, unclear why INR elevation at this time  ROS:  Intubated, unable    Vitals: Blood pressure (!) 103/47, pulse 66, temperature 98 6 °F (37 °C), temperature source Oral, resp  rate 16, height 5' 4" (1 626 m), weight 73 5 kg (162 lb), SpO2 98 %, not currently breastfeeding  ,Body mass index is 27 81 kg/m²         Current Facility-Administered Medications:     acetaminophen (TYLENOL) tablet 650 mg, 650 mg, Oral, Q6H PRN, Kenrick Tyler DO, 650 mg at 04/30/18 0949    albuterol (PROVENTIL HFA,VENTOLIN HFA) inhaler 2 puff, 2 puff, Inhalation, Q6H PRN, Kolby Day MD    atorvastatin (LIPITOR) tablet 80 mg, 80 mg, Oral, Daily With Dinner, Kolby Day MD, 80 mg at 04/29/18 1738    chlorhexidine (PERIDEX) 0 12 % oral rinse 15 mL, 15 mL, Swish & Spit, Q12H [address], Flaco Estevez DO, 15 mL at 04/30/18 0944    cholecalciferol (VITAMIN D3) tablet 1,000 Units, 1,000 Units, Oral, Daily, Braeden Qureshi MD, 1,000 Units at 04/30/18 0948    dextrose 50 % IV solution 25 mL, 25 mL, Intravenous, Once, Ashlie James PA-C    fentaNYL 1250 mcg in sodium chloride 0 9% 125mL drip, 50 mcg/hr, Intravenous, Continuous, Flaco Estevez DO, Last Rate: 5 mL/hr at 04/29/18 1136, 50 mcg/hr at 04/29/18 1136    fluticasone (FLOVENT HFA) 110 MCG/ACT inhaler 1 puff, 1 puff, Inhalation, Q12H [address], Braeden Qureshi MD, 1 puff at 04/29/18 0822    insulin regular (HumuLIN R,NovoLIN R) 1 Units/mL in sodium chloride 0 9 % 100 mL infusion, 0 3-21 Units/hr, Intravenous, Titrated, Flaco Estevez DO, Last Rate: 1 mL/hr at 04/30/18 0402, 1 Units/hr at 04/30/18 0402    labetalol (NORMODYNE) injection 10 mg, 10 mg, Intravenous, Q6H PRN, Xochitl Estevez DO    levETIRAcetam (KEPPRA) 1,000 mg in sodium chloride 0 9 % 100 mL IVPB, 1,000 mg, Intravenous, Q12H [address], Flaco Estevez DO, Last Rate: 400 mL/hr at 04/30/18 0942, 1,000 mg at 04/30/18 0942    levothyroxine tablet 100 mcg, 100 mcg, Oral, Daily, Braeden Qureshi MD, 100 mcg at 04/30/18 0545    loratadine (CLARITIN) tablet 10 mg, 10 mg, Oral, Daily, Braeden Qureshi MD, 10 mg at 04/30/18 0948    LORazepam (ATIVAN) 2 mg/mL injection 2 mg, 2 mg, Intravenous, PRN, Sulma Cm DO    metoclopramide (REGLAN) injection 10 mg, 10 mg, Intravenous, Q6H PRN, Braeden Qureshi MD, 10 mg at 04/29/18 0110    metoprolol tartrate (LOPRESSOR) tablet 50 mg, 50 mg, Oral, Q12H [address], Tricia Haley MD, 50 mg at 04/30/18 0948    nystatin (MYCOSTATIN) powder, , Topical, BID, Flaco Estevez DO    omeprazole (PRILOSEC) suspension 2 mg/mL, 20 mg, Oral, Daily, Flaco Estevez DO, 20 mg at 04/30/18 0948    senna-docusate sodium (SENOKOT S) 8 6-50 mg per tablet 1 tablet, 1 tablet, Oral, HS, Thais Cuenca PA-C, 1 tablet at 04/29/18 1728    Physical Exam:     Constitutional - She is agitated, she is being weaned for extubation  HEENT - Intubated  Cardiac - Tachycardia, rate regular  Lungs - Clear to auscultation bilaterally, no wheezing noted  Neurological    Mental status - Intubated, thrashing in bed, in 4 point restraints, no following commands for me at this time, however, very restless during examination  Higher MS could not be assessed  Cranial nerves - pupils are small minimally reactive, no gaze preference or palsy, no obvious facial droop (but intubated), no blink to threat bilaterally     Motor - In restraints in uppers, however, observed moving extremities in uppers equally, lowers with drawls equally to stimuli  No tremor noted, or myoclonus noted  Sensation - withdrawals lowers to stimuli    Coordination -  Unable to assess at this time    Toes are up going bilatearlly    Lab, Imaging and other studies:     Per radiology interpretation -    "  Ct Abdomen Pelvis Wo Contrast    Result Date: 4/28/2018  Narrative: CT ABDOMEN AND PELVIS WITHOUT IV CONTRAST INDICATION:   abdominal pain, nausea  Nausea and weakness for 2 days  COMPARISON: CT chest abdomen pelvis performed March 15, 2018 TECHNIQUE:  CT examination of the abdomen and pelvis was performed without intravenous contrast   Axial, sagittal, and coronal 2D reformatted images were created from the source data and submitted for interpretation  Radiation dose length product (DLP) for this visit:  710 22 mGy-cm   This examination, like all CT scans performed in the [facility], was performed utilizing techniques to minimize radiation dose exposure, including the use of iterative  reconstruction and automated exposure control  Enteric contrast was not administered  FINDINGS: ABDOMEN LOWER CHEST:  Emphysematous changes are present in the lower lobes of the lungs bilaterally   The heart is enlarged  Coronary artery calcifications  Prior aortic valve replacement  LIVER/BILIARY TREE:  Unremarkable  GALLBLADDER:  Surgically absent  SPLEEN:  Unremarkable  PANCREAS:  Unremarkable  ADRENAL GLANDS:  Unremarkable  KIDNEYS/URETERS:  One or more sharply circumscribed subcentimeter renal hypodensities are noted  These lesions are too small to accurately characterize, but are statistically most likely to represent benign cortical renal cyst(s)  According to the guidelines published in the [facility] Paper of the ACR Incidental Findings Committee (Radiology 2010), no further workup of these lesions is recommended  Renal calculi bilaterally, stable from the prior study  No hydronephrosis or hydroureter  No ureteric calculi  Extensive vascular calcifications in the renal vessels  STOMACH AND BOWEL:  Evaluation limited due to lack of oral contrast material  Stomach decompressed  Hiatal hernia  Small bowel unremarkable  Moderate amount of liquid feces throughout the colon  Correlate for diarrhea  APPENDIX:  Surgically absent  ABDOMINOPELVIC CAVITY:  No ascites or free intraperitoneal air  No lymphadenopathy  VESSELS:  Extensive vascular calcifications  PELVIS REPRODUCTIVE ORGANS:  Surgically absent  URINARY BLADDER:  Unremarkable  ABDOMINAL WALL/INGUINAL REGIONS:  Unremarkable  OSSEOUS STRUCTURES:  No acute fracture or destructive osseous lesion  Degenerative changes  Impression: No acute abdominal pelvic pathology  No significant change from prior  Workstation performed: [id number]     Ct Head Wo Contrast    Result Date: 4/29/2018  Narrative: CT BRAIN - WITHOUT CONTRAST INDICATION:   Stroke  Seizure  Rapid response  COMPARISON:  CT brain April 28, 2018  TECHNIQUE:  CT examination of the brain was performed  In addition to axial images, coronal 2D reformatted images were created and submitted for interpretation  Radiation dose length product (DLP) for this visit:  980 mGy-cm     This examination, like all CT scans performed in the [facility], was performed utilizing techniques to minimize radiation dose exposure, including the use of iterative reconstruction and automated exposure control  IMAGE QUALITY:  Diagnostic  FINDINGS: PARENCHYMA: Decreased attenuation is noted in periventricular and subcortical white matter demonstrating an appearance that is statistically most likely to represent advanced microangiopathic change  Chronic lacunar infarction(s) are noted in basal ganglia  No CT signs of acute infarction  No intracranial mass, mass effect or midline shift  No acute parenchymal hemorrhage  Atherosclerotic vascular calcifications in carotid and vertebral arteries are more advanced than would be expected for the patient's  age  VENTRICLES AND EXTRA-AXIAL SPACES:  Normal for the patient's age  VISUALIZED ORBITS AND PARANASAL SINUSES:  Unremarkable  CALVARIUM AND EXTRACRANIAL SOFT TISSUES:  Normal      Impression: No acute intracranial abnormality  Microangiopathic changes  Workstation performed: [id number]     Ct Head Without Contrast    Result Date: 4/28/2018  Narrative: CT BRAIN - WITHOUT CONTRAST INDICATION:   AMS ans vomit  COMPARISON:  April 16, 2018 TECHNIQUE:  CT examination of the brain was performed  In addition to axial images, coronal 2D reformatted images were created and submitted for interpretation  Radiation dose length product (DLP) for this visit:  994 mGy-cm   This examination, like all CT scans performed in the [facility], was performed utilizing techniques to minimize radiation dose exposure, including the use of iterative reconstruction and automated exposure control  IMAGE QUALITY:  Diagnostic   FINDINGS: PARENCHYMA: Decreased attenuation is noted in periventricular and subcortical white matter demonstrating an appearance that is statistically most likely to represent moderate microangiopathic change; this appearance is similar when compared to most recent prior examination  No CT signs of acute infarction  No intracranial mass, mass effect or midline shift  No acute parenchymal hemorrhage  VENTRICLES AND EXTRA-AXIAL SPACES:  Enlargement of ventricles and extra-axial CSF spaces, out of proportion to the patient's age most consistent with cerebral and cerebellar atrophy  VISUALIZED ORBITS AND PARANASAL SINUSES:  Partial opacification of the sphenoid sinuses is seen  CALVARIUM AND EXTRACRANIAL SOFT TISSUES:  Normal      Impression: No acute intracranial abnormality  Workstation performed: [id number]     Ct Head Without Contrast    Result Date: 4/16/2018  Narrative: CT BRAIN - WITHOUT CONTRAST INDICATION:   Family noticed patient having difficulty with speech  COMPARISON:  CT head 6/27/2017 TECHNIQUE:  CT examination of the brain was performed  In addition to axial images, coronal 2D reformatted images were created and submitted for interpretation  Radiation dose length product (DLP) for this visit:  950 4 mGy-cm   This examination, like all CT scans performed in the [facility], was performed utilizing techniques to minimize radiation dose exposure, including the use of iterative reconstruction and automated exposure control  IMAGE QUALITY:  Diagnostic  FINDINGS: PARENCHYMA:  No intracranial mass, mass effect or midline shift  No CT signs of acute infarction  No acute intracranial hemorrhage  Age-related cortical atrophy  Advanced periventricular and subcortical white matter hypodensity which is nonspecific although most compatible with chronic small vessel ischemic disease  Vascular and bilateral basal ganglia calcifications  VENTRICLES AND EXTRA-AXIAL SPACES:  Normal for the patient's age  VISUALIZED ORBITS AND PARANASAL SINUSES:  No acute abnormality involving the orbits  Mild scattered sinus mucosal thickening is noted  No fluid levels are seen   CALVARIUM AND EXTRACRANIAL SOFT TISSUES:  Normal      Impression: No acute intracranial abnormality  Age related atrophy and findings most compatible with chronic small vessel ischemic disease  Workstation performed: [id number]     Mri Brain Wo Contrast    Result Date: 4/17/2018  Narrative: MRI BRAIN WITHOUT CONTRAST INDICATION:  expressive aphasia  patient states she knew what she wanted to say but couldn't get the words out-episode occurred yesterday COMPARISON:   4/16/2018 TECHNIQUE:  Sagittal T1, axial T2, axial FLAIR, axial T1, axial [place] and axial diffusion imaging  IMAGE QUALITY:  Diagnostic  FINDINGS: BRAIN PARENCHYMA:  There are at least 4 discrete punctate foci of diffusion restriction including one in the left cerebellum on image 4, series 3  There is a small to moderate-sized cortical infarction in the right occipital lobe on image 13, series 3 in the right posterior cerebral artery territory  More superiorly in the right parietal lobe posteriorly on image 22 series 3 there is a 3rd focus of cortical diffusion restriction in the right MCA territory  A tiny subtle punctate cortical focus in the left frontal lobe anteriorly and inferiorly on image 12 series 3 is noted in the left MCA territory implicating multiple vascular territories with no areas of acute infarction  Elsewhere there is confluent periventricular FLAIR hyperintensity  Striated brainstem and cerebellar hyperintensity noted as well  No acute intracranial hemorrhage  No extra-axial fluid collection  Cerebellar tonsils are normally positioned  VENTRICLES:  Age-appropriate prominence noted  SELLA AND PITUITARY GLAND:  Normal  ORBITS:  Bilateral lens implants noted  PARANASAL SINUSES:  Moderate right sphenoid sinus signal abnormality  Mild bilateral ethmoidal sinus disease  VASCULATURE:  Evaluation of the major intracranial vasculature demonstrates appropriate flow voids   CALVARIUM AND SKULL BASE:  Normal  EXTRACRANIAL SOFT TISSUES:  Normal      Impression: 1   4 punctate foci of diffusion restriction indicative of scattered tiny acute/recent lacunar infarctions in 4 distinct vascular territories implicating a central embolic source  Does the patient have atrial fibrillation? 2  Advanced, chronic microangiopathy  Workstation performed: [id number]     Vas Carotid Complete Study    Result Date: 4/18/2018  Narrative:  [facility] REPORT CLINICAL: Indications:  Patient presents with to evaluate for carotid artery stenosis s/p recent CVA  The patient denies symptoms  Operative History CABG lower extremity a-gram 12/2017  FINDINGS:  Segment      Rig                     Left                      PSV  EDV (cm/s)  Ratio  PSV  EDV (cm/s)  Ratio  Dist  ICA     50          17   0 71   60          20   1 22  Mid  ICA      76          23   1 09   55          18   1 11  Prox  ICA     54          14   0 77   58          16   1 18  Dist CCA      56          11          48          11         Mid CCA       70          14   1 41   50          12   0 78  Prox CCA      50           9          63          12         Ext Carotid  226          28   3 24   55           0   1 11  Prox Vert     73          16          93          19         Subclavian    98           0         105           6            CONCLUSION:  Impression RIGHT: There is <50% stenosis noted in the internal carotid artery  Plaque is heterogenous and irregular  Vertebral artery flow is antegrade  There is no significant subclavian artery disease  LEFT: There is <50% stenosis noted in the internal carotid artery  Plaque is heterogenous and irregular  Vertebral artery flow is antegrade  There is no significant subclavian artery disease  Compared to previous study on 03/18/2018, there is no change  Internal carotid artery stenosis determination by consensus criteria from: Aileen Fisher et al  Carotid Artery Stenosis: Gray-Scale and Doppler US Diagnosis - Society of Radiologists in [address] [place] [address] [facility], Radiology 2003; [phone]    SIGNATURE: Electronically Signed by: Jason Farr on 2018-04-18 06:57:52 AM  "  Recent Results (from the past 24 hour(s))   Fingerstick Glucose (POCT)    Collection Time: 04/29/18 10:18 AM   Result Value Ref Range    POC Glucose 238 (H) 65 - 140 mg/dl   POCT Blood Gas (CG8+)    Collection Time: 04/29/18 10:25 AM   Result Value Ref Range    ph, Dusty ISTAT 7 170 (LL) 7 300 - 7 400    pCO2, Dusty i-STAT 36 6 (L) 42 0 - 50 0 mm HG    pO2, Dusty i-STAT >400 0 (H) 35 0 - 45 0 mm HG    BE, i-STAT -14 (L) -2 - 3 mmol/L    HCO3, Dusty i-STAT 13 3 (LL) 24 0 - 30 0 mmol/L    CO2, i-STAT 14 (L) 21 - 32 mmol/L    O2 Sat, i-STAT  95 - 98 %    SODIUM, I-STAT 141 136 - 145 mmol/l    Potassium, i-STAT 3 5 3 5 - 5 3 mmol/L    Calcium, Ionized i-STAT 0 81 (L) 1 12 - 1 32 mmol/L    Hct, i-STAT 31 (L) 34 8 - 46 1 %    Hgb, i-STAT 10 5 (L) 11 5 - 15 4 g/dl    Glucose, i-STAT 220 (H) 65 - 140 mg/dl    Specimen Type VENOUS    Lactic acid, plasma    Collection Time: 04/29/18 10:28 AM   Result Value Ref Range    LACTIC ACID  0 5 - 2 0 mmol/L   Protime-INR    Collection Time: 04/29/18 10:28 AM   Result Value Ref Range    Protime 85 4 (H) 12 1 - 14 4 seconds    INR 10 50 (HH) 0 86 - 1 16   APTT    Collection Time: 04/29/18 10:28 AM   Result Value Ref Range    PTT 62 (H) 23 - 35 seconds   CBC and Platelet    Collection Time: 04/29/18 10:28 AM   Result Value Ref Range    WBC 15 93 (H) 4 31 - 10 16 Thousand/uL    RBC 4 76 3 81 - 5 12 Million/uL    Hemoglobin 9 9 (L) 11 5 - 15 4 g/dL    Hematocrit 33 4 (L) 34 8 - 46 1 %    MCV 70 (L) 82 - 98 fL    MCH 20 8 (L) 26 8 - 34 3 pg    MCHC 29 6 (L) 31 4 - 37 4 g/dL    RDW 19 0 (H) 11 6 - 15 1 %    Platelets 676 264 - 655 Thousands/uL    MPV 10 8 8 9 - 12 7 fL   Comprehensive metabolic panel    Collection Time: 04/29/18 10:29 AM   Result Value Ref Range    Sodium 141 136 - 145 mmol/L    Potassium 3 7 3 5 - 5 3 mmol/L    Chloride 106 100 - 108 mmol/L    CO2 11 (L) 21 - 32 mmol/L    Anion Gap 24 (H) 4 - 13 mmol/L BUN 14 5 - 25 mg/dL    Creatinine 1 13 0 60 - 1 30 mg/dL    Glucose 204 (H) 65 - 140 mg/dL    Calcium 6 3 (L) 8 3 - 10 1 mg/dL    AST 43 5 - 45 U/L    ALT 21 12 - 78 U/L    Alkaline Phosphatase 73 46 - 116 U/L    Total Protein 7 5 6 4 - 8 2 g/dL    Albumin 2 9 (L) 3 5 - 5 0 g/dL    Total Bilirubin 0 83 0 20 - 1 00 mg/dL    eGFR 46 ml/min/1 73sq m   Calcium, ionized    Collection Time: 04/29/18 10:29 AM   Result Value Ref Range    Calcium, Ionized 0 82 (L) 1 12 - 1 32 mmol/L   Fingerstick Glucose (POCT)    Collection Time: 04/29/18 11:38 AM   Result Value Ref Range    POC Glucose 192 (H) 65 - 140 mg/dl   ECG 12 lead    Collection Time: 04/29/18 12:28 PM   Result Value Ref Range    Ventricular Rate 84 BPM    Atrial Rate 84 BPM    IL Interval 238 ms    QRSD Interval 150 ms    QT Interval 471 ms    QTC Interval 557 ms    P [place] 64 degrees    QRS Axis 263 degrees    T Wave Axis 104 degrees   Blood gas, arterial    Collection Time: 04/29/18  2:16 PM   Result Value Ref Range    pH, Arterial 7 452 (H) 7 350 - 7 450    PH ART TC 7 446 7 350 - 7 450    pCO2, Arterial 30 4 (L) 36 0 - 44 0 mm Hg    PCO2 (TC) Arterial 30 9 (L) 36 0 - 44 0 mm Hg    pO2, Arterial 128 6 75 0 - 129 0 mm Hg    PO2 (TC) Arterial 131 1 (H) 75 0 - 129 0 mm Hg    HCO3, Arterial 20 7 (L) 22 0 - 28 0 mmol/L    Base Excess, Arterial -2 6 mmol/L    O2 Content, Arterial 12 4 (L) 16 0 - 23 0 mL/dL    O2 HGB,Arterial  97 7 (H) 94 0 - 97 0 %    SOURCE Line, Arterial     ZAY TEST Yes     Temperature 99 3 Degrees Fehrenheit    Vent Type- AC     Lactic acid, plasma    Collection Time: 04/29/18  2:17 PM   Result Value Ref Range    LACTIC ACID 1 2 0 5 - 2 0 mmol/L   Fingerstick Glucose (POCT)    Collection Time: 04/29/18  3:04 PM   Result Value Ref Range    POC Glucose 111 65 - 140 mg/dl   Fingerstick Glucose (POCT)    Collection Time: 04/29/18  3:37 PM   Result Value Ref Range    POC Glucose 77 65 - 140 mg/dl   Protime-INR    Collection Time: 04/29/18  3:38 PM Result Value Ref Range    Protime 61 4 (H) 12 1 - 14 4 seconds    INR 6 94 (HH) 0 86 - 1 16   APTT    Collection Time: 04/29/18  3:38 PM   Result Value Ref Range    PTT 53 (H) 23 - 35 seconds   Lactic acid, plasma    Collection Time: 04/29/18  3:38 PM   Result Value Ref Range    LACTIC ACID 1 0 0 5 - 2 0 mmol/L   Basic metabolic panel    Collection Time: 04/29/18  3:38 PM   Result Value Ref Range    Sodium 143 136 - 145 mmol/L    Potassium 3 5 3 5 - 5 3 mmol/L    Chloride 110 (H) 100 - 108 mmol/L    CO2 23 21 - 32 mmol/L    Anion Gap 10 4 - 13 mmol/L    BUN 15 5 - 25 mg/dL    Creatinine 0 73 0 60 - 1 30 mg/dL    Glucose 84 65 - 140 mg/dL    Calcium 5 9 (LL) 8 3 - 10 1 mg/dL    eGFR 78 ml/min/1 73sq m   Magnesium    Collection Time: 04/29/18  3:39 PM   Result Value Ref Range    Magnesium 0 5 (LL) 1 6 - 2 6 mg/dL   Fingerstick Glucose (POCT)    Collection Time: 04/29/18  5:30 PM   Result Value Ref Range    POC Glucose 75 65 - 140 mg/dl   Fingerstick Glucose (POCT)    Collection Time: 04/29/18  6:13 PM   Result Value Ref Range    POC Glucose 116 65 - 140 mg/dl   Basic metabolic panel    Collection Time: 04/29/18  8:39 PM   Result Value Ref Range    Sodium 143 136 - 145 mmol/L    Potassium 3 8 3 5 - 5 3 mmol/L    Chloride 109 (H) 100 - 108 mmol/L    CO2 24 21 - 32 mmol/L    Anion Gap 10 4 - 13 mmol/L    BUN 15 5 - 25 mg/dL    Creatinine 0 94 0 60 - 1 30 mg/dL    Glucose 124 65 - 140 mg/dL    Calcium 6 2 (L) 8 3 - 10 1 mg/dL    eGFR 57 ml/min/1 73sq m   Magnesium    Collection Time: 04/29/18  8:39 PM   Result Value Ref Range    Magnesium 1 8 1 6 - 2 6 mg/dL   Fingerstick Glucose (POCT)    Collection Time: 04/29/18  8:47 PM   Result Value Ref Range    POC Glucose 143 (H) 65 - 140 mg/dl   Magnesium    Collection Time: 04/29/18 10:12 PM   Result Value Ref Range    Magnesium 1 6 1 6 - 2 6 mg/dL   Fingerstick Glucose (POCT)    Collection Time: 04/29/18 10:16 PM   Result Value Ref Range    POC Glucose 127 65 - 140 mg/dl Fingerstick Glucose (POCT)    Collection Time: 04/29/18 11:49 PM   Result Value Ref Range    POC Glucose 130 65 - 140 mg/dl   Fingerstick Glucose (POCT)    Collection Time: 04/30/18  2:11 AM   Result Value Ref Range    POC Glucose 129 65 - 140 mg/dl   Fingerstick Glucose (POCT)    Collection Time: 04/30/18  3:53 AM   Result Value Ref Range    POC Glucose 164 (H) 65 - 140 mg/dl   Fingerstick Glucose (POCT)    Collection Time: 04/30/18  5:37 AM   Result Value Ref Range    POC Glucose 165 (H) 65 - 140 mg/dl   CBC    Collection Time: 04/30/18  5:40 AM   Result Value Ref Range    WBC 8 24 4 31 - 10 16 Thousand/uL    RBC 3 64 (L) 3 81 - 5 12 Million/uL    Hemoglobin 7 5 (L) 11 5 - 15 4 g/dL    Hematocrit 24 7 (L) 34 8 - 46 1 %    MCV 68 (L) 82 - 98 fL    MCH 20 6 (L) 26 8 - 34 3 pg    MCHC 30 4 (L) 31 4 - 37 4 g/dL    RDW 19 1 (H) 11 6 - 15 1 %    Platelets 228 137 - 995 Thousands/uL    MPV 9 6 8 9 - 12 7 fL   Basic metabolic panel    Collection Time: 04/30/18  5:40 AM   Result Value Ref Range    Sodium 142 136 - 145 mmol/L    Potassium 3 2 (L) 3 5 - 5 3 mmol/L    Chloride 109 (H) 100 - 108 mmol/L    CO2 24 21 - 32 mmol/L    Anion Gap 9 4 - 13 mmol/L    BUN 17 5 - 25 mg/dL    Creatinine 0 98 0 60 - 1 30 mg/dL    Glucose 153 (H) 65 - 140 mg/dL    Calcium 6 0 (L) 8 3 - 10 1 mg/dL    eGFR 55 ml/min/1 73sq m   CBC and differential    Collection Time: 04/30/18  5:40 AM   Result Value Ref Range    WBC 8 24 4 31 - 10 16 Thousand/uL    RBC 3 64 (L) 3 81 - 5 12 Million/uL    Hemoglobin 7 5 (L) 11 5 - 15 4 g/dL    Hematocrit 24 7 (L) 34 8 - 46 1 %    MCV 68 (L) 82 - 98 fL    MCH 20 6 (L) 26 8 - 34 3 pg    MCHC 30 4 (L) 31 4 - 37 4 g/dL    RDW 19 1 (H) 11 6 - 15 1 %    MPV 9 6 8 9 - 12 7 fL    Platelets 243 881 - 681 Thousands/uL    nRBC 0 /100 WBCs    Neutrophils Relative 77 (H) 43 - 75 %    Lymphocytes Relative 13 (L) 14 - 44 %    Monocytes Relative 7 4 - 12 %    Eosinophils Relative 3 0 - 6 %    Basophils Relative 0 0 - 1 % Neutrophils Absolute 6 33 1 85 - 7 62 Thousands/µL    Lymphocytes Absolute 1 07 0 60 - 4 47 Thousands/µL    Monocytes Absolute 0 57 0 17 - 1 22 Thousand/µL    Eosinophils Absolute 0 22 0 00 - 0 61 Thousand/µL    Basophils Absolute 0 02 0 00 - 0 10 Thousands/µL   Magnesium    Collection Time: 04/30/18  5:40 AM   Result Value Ref Range    Magnesium 2 0 1 6 - 2 6 mg/dL   Phosphorus    Collection Time: 04/30/18  5:40 AM   Result Value Ref Range    Phosphorus 3 1 2 3 - 4 1 mg/dL   Protime-INR    Collection Time: 04/30/18  5:40 AM   Result Value Ref Range    Protime 33 5 (H) 12 1 - 14 4 seconds    INR 3 23 (H) 0 86 - 1 16   APTT    Collection Time: 04/30/18  5:40 AM   Result Value Ref Range    PTT 47 (H) 23 - 35 seconds   Fingerstick Glucose (POCT)    Collection Time: 04/30/18  7:36 AM   Result Value Ref Range    POC Glucose 180 (H) 65 - 140 mg/dl     VTE Prophylaxis: Sequential compression device (Venodyne)     Counseling / Coordination of Care  Total Critical Care time spent 25 minutes excluding procedures, teaching and family updates

## 2018-04-30 NOTE — PROGRESS NOTES
Progress Note - ICU Transfer to Step down/med  surg  Niya Bedoya [de-identified] y o  female MRN: [id number]    Unit/Bed#: ICU 03 Encounter: [id number]      Code Status: Level 1 - Full Code    Date of ICU admission: 4/29/18    Reason for ICU adm: New onset seizure, intubated for airway protection    Active problems:   Patient Active Problem List   Diagnosis    Essential hypertension    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 native heart with angina pectoris ([facility] )    Type 2 diabetes mellitus with complication, with long-term current use of insulin (HCC)    Atrial flutter ([facility] )    Hyperlipidemia    Gastroesophageal reflux disease without esophagitis    Moderate mitral stenosis    Arthritis of hand    Asthma    Asymptomatic carotid artery stenosis, bilateral    Atherosclerosis of artery of extremity with ulceration [facility]    Hx of CABG    Chronic anticoagulation    Chronic diastolic congestive heart failure (HCC)    Degenerative joint disease involving multiple joints    Diabetic retinopathy (HCC)    Postoperative hypothyroidism    Migraine headache    Nephrolithiasis    Osteoarthritis of both knees    Paroxysmal atrial fibrillation (HCC)    Ulcer of toe of left foot (HCC)    Ulcer of toe of right foot (HCC)    Prolonged Q-T interval on ECG    Hypokalemia    Hypocalcemia    Left bundle branch block (LBBB)    1st degree AV block    S/P TAVR (transcatheter aortic valve replacement)    Expressive aphasia    Multiple lacunar infarcts (HCC)    Vomiting    History of CVA (cerebrovascular accident)    Seizure ([facility] )    Supratherapeutic INR    PVC (premature ventricular contraction)    Pulmonary hypertension (HCC)    Anemia       Summary of clinical course: [de-identified] yo female with PMHx of CAD, CABG, afib, hypothyroid, type 2 diabetes mellitus, carotid artery disease, HTN, hyperlipidemia, and recent stroke who presented to the ED on 4/27 for nausea, vomiting, and weakness      The pt recently underwent TAVR on 4/3/18  She has been on Coumadin at home for many years for a history of afib  She was discharged to rehab and was doing very well until she was home about a week and developed aphasia  She was admitted on 4/16 and a workup including MRI suggested cardioembolic CVA with multiple foci of infarct  Her INR was actually supratherapeutic at that time and so it was deemed indicative of Coumadin failure  She was switched to Xarelto and discharged home  Her son says she was "not herself" since that discharge -- more fatigued, not able to walk as far, weak  Developed N/V as well and was seen on the ED on 4/27 for this  Her workup there was unremarkable, and she was feeling better, so she was discharged home  She returned later that evening for recurrent symptoms and was admitted  She underwent a workup for altered mental status, including ammonia and CT head which were both unremarkable  Other notable issues were hypocalcemia which has been repeatedly replaced  On 4/28, she was noted to have some tonic clonic activity witnessed by the medical team and a rapid response was called  By the time the rapid response was called, she was not having tonic clonic activity, but was not making meaningful eye contact; had some lip-smacking motions, and was not answering to voice  She was intubated for airway protection, received a total of 4mg of Ativan, received a CT head which was non-acute, and was transferred to the floor  Her last dose of Xarelto was in the morning on 4/28  In the ICU, she received a Keppra load of 2g followed by 1G Q 12 H  She was initiated on tube feeds  24H EMU was completed and without seizure activity  Noted to have a Mg++ of 0 5 which was repleted after several doses of mag sulfate  Continues to require calcium repletion  Was having short runs of PVCs while her Mg was low   She was initially hypertensive with SBP around 200 during the rapid response; her BP then started to run on the low side but not enough to require a fluid bolus  Her hgb dropped this AM from 9 9 yesterday to 7 5  No signs of bleeding  Repeat was 8 1  Needs to be monitored  Labs done during rapid response revealed an INR of 10  She has not taken Coumadin since her discharge on 4/18 -- this was confirmed with her family who was taking care of her medications  Repeat INRs were 6, and then 3 this morning  For this reason, her home ASA is on hold and she did not receive any heparin or chemical VTE ppx in the ICU  Unclear why her INR is elevated  She became more alert, responding to command while on Fentanyl drip at 50mcg/hour  She was successfully extubated without issue  Recent or scheduled procedures:   -24H EMU -- no seizure      Outstanding/pending diagnostics:   -tentative MRI -- to be ordered by neurology  -follow up on INR, coagulopathy  -follow up hemoglobin -- 9 9 yesterday, dropped to 7 5 this AM  Repeat was 8 1  AM ordered      Hospital discharge planning:  PT/OT consults requested

## 2018-04-30 NOTE — RESPIRATORY THERAPY NOTE
RT Ventilator Management Note  Carline Del Castillo [de-identified] y o  female MRN: [id number]  Unit/Bed#: ICU 03 Encounter: [id number]      Daily Screen       4/29/2018 1100             Patient safety screen outcome[de-identified] Failed    Not Ready for Weaning due to[de-identified] Poor inspiratory effort            Physical Exam:   Assessment Type: Assess only  General Appearance: Sedated  Respiratory Pattern: Assisted, Normal  Chest Assessment: Chest expansion symmetrical  Bilateral Breath Sounds: Clear  R Breath Sounds: Clear  L Breath Sounds: Clear  Cough: None  Suction: ET Tube  O2 Device: ventilator      Resp Comments: Pt tolerating current vent settings and appears to be resting comfortably  No vent changes at this time  Will continue to monitor and assess per resp protocol

## 2018-04-30 NOTE — RESPIRATORY THERAPY NOTE
RT Ventilator Management Note  Hira Flores [de-identified] y o  female MRN: [id number]  Unit/Bed#: ICU 03 Encounter: [id number]      Daily Screen       4/29/2018 1100             Patient safety screen outcome[de-identified] Failed    Not Ready for Weaning due to[de-identified] Poor inspiratory effort            Physical Exam:   Assessment Type: Assess only  General Appearance: Alert, Awake  Respiratory Pattern: Assisted  Chest Assessment: Chest expansion symmetrical  Bilateral Breath Sounds: Clear  R Breath Sounds: Clear  L Breath Sounds: Clear  Cough: None  Suction: ET Tube  O2 Device: ventilator      Resp Comments: (P) pt placed on cpap/ps wean, pt awake alert following commands, will continue to monitor

## 2018-04-30 NOTE — RESPIRATORY THERAPY NOTE
RT Ventilator Management Note  Toshia Walker [de-identified] y o  female MRN: [id number]  Unit/Bed#: ICU 03 Encounter: [id number]      Daily Screen       4/29/2018 1100 4/30/2018 0851          Patient safety screen outcome[de-identified] Failed Passed      Not Ready for Weaning due to[de-identified] Poor inspiratory effort -      Spont breathing trial % for 30 min: - -      Spont breathing trial outcome[de-identified] - Passed      Name of Medical Team Notified[de-identified] - [facility]              Physical Exam:   Assessment Type: Assess only  General Appearance: Alert, Awake  Respiratory Pattern: Assisted  Chest Assessment: Chest expansion symmetrical  Bilateral Breath Sounds: Clear  Suction: ET Tube      Resp Comments: (P) pt did very well on wean, placed back on  previous settings Dr dos santos, will continue to monitor

## 2018-04-30 NOTE — RESPIRATORY THERAPY NOTE
RT Ventilator Management Note  Isamar العلي [de-identified] y o  female MRN: [id number]  Unit/Bed#: ICU 03 Encounter: [id number]      Daily Screen       4/30/2018 0851 4/30/2018 1147          Patient safety screen outcome[de-identified] Passed -      Spont breathing trial % for 30 min: - -      Spont breathing trial outcome[de-identified] Passed -      Name of Medical Team Notified[de-identified] CCM -      Consider Cuff Test: - Yes              Physical Exam:   Assessment Type: Assess only  General Appearance: Alert, Awake  Respiratory Pattern: Assisted  Chest Assessment: Chest expansion symmetrical  Bilateral Breath Sounds: Clear  Suction: Oral, ET Tube      Resp Comments: (P) Pt extub to 6lnc  Good bs bilat, whispered voice, no stridor heard at neck  Vitals stable at this time  Plan is to cont  to monitor

## 2018-04-30 NOTE — PROGRESS NOTES
[facility] Unit Transfer Note  Unit/Bed ICU-03 Encounter: [id number]  SOD Team C           Edward Mcbride [de-identified] y o  female [id number]      Active Hospital Problems: Active Problems:    Essential hypertension    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 native heart with angina pectoris (HCC)    Type 2 diabetes mellitus with complication, with long-term current use of insulin (HCC)    Atrial flutter (HCC)    Hyperlipidemia    Gastroesophageal reflux disease without esophagitis    Moderate mitral stenosis    Asthma    Asymptomatic carotid artery stenosis, bilateral    Atherosclerosis of artery of extremity with ulceration (HCC)    Hx of CABG    Chronic anticoagulation    Postoperative hypothyroidism    Paroxysmal atrial fibrillation (HCC)    Hypokalemia    Hypocalcemia    Left bundle branch block (LBBB)    Expressive aphasia    Multiple lacunar infarcts (HCC)    Vomiting    History of CVA (cerebrovascular accident)    Seizure ([facility] )    Supratherapeutic INR    PVC (premature ventricular contraction)    Pulmonary hypertension (HCC)    Anemia      New Onset Seizure  · Unclear etiology, likely due to recent stroke  · Neurology following  · Patient was loaded on Keppra 2g on 4/29  · Continue Keppra 750 mg q 12 hours  · 24 hr EMU did not show any seizure activity  · MRI recommended awaiting -neurology order  · Continue non violent restraints- unable to follow directions  · Intravenous Ativan 2 mg as needed for seizure  · OT/PT/Speech eval   · Continue dysphagia diet  · BMP, CBC, magnesium, PT INR pending  · Echo pending     History of cardioembolic stroke  · Presented with aphasia a week after her TAVR procedure   · 4/17 MRI showed for puncture restriction indicative of scattered acute or recent lacunar infarct in for distinct vascular territories   ( right posterior cerebral, right MCA, left frontal lobe anterior inferior )  · Continue telemetry monitoring  · Echo pending      Atrial Fibrillation/Aflutter  · Previously on Coumadin  Coumadin was switched to Xarelto after stroke presumed to be due to Coumadin failure (INR was therapeutic at that time)   · Continue metoprolol 50 mg p o  b i d - outpatient doses 100 mg daily  Coronary Artery Disease  · Continue Lipitor 80 mg daily  · Aspirin on hold, can resume in a m  Hypertension  · Labetalol 10 mg intravenous q 6 hours as needed for high blood pressure >180      Diabetes mellitus type 2  · Insulin sliding scale, accuchecks  · Outpatient metformin 1000mg BID and LANTUS 30 units BID      COPD  · Continue albuterol, Flovent  · Respiratory protocol    Hypothyroidism  · Continue levothyroxine 100 mcg         VTE Pharmacologic Prophylaxis: Reason for no pharmacologic prophylaxis Elevated INR  VTE Mechanical Prophylaxis: sequential compression device    Disposition: Patient requires Med/Surg with Telemetry    Hospital Course: Marcello Shepard is a [de-identified]year old female with pmhx of Afib on Coumadin, type 2 diabetes on insulin, GERD, coronary artery disease status post CABG with recent TAVR, history of recent cardioembolic stroke with presumed Coumadin failure presented to the emergency room for evaluation of nausea/vomiting and weakness  Of note, patient developed a cardioembolic stroke a week after TAVR procedure presumed to be due to AFib with Coumadin failure  ( last dose was on 04/18) with supratheurapeutic INR and was switched to Xarelto (last dose was 4/28)  Patient was discharged home , had a visit to the ED and then was discharged home without being admitted,  however was not back to baseline  She returned with recurrent symptoms of nausea and vomiting  Received altered mental status workup was unremarkable  CT head did not show any acute changes ammonia level was normal   Her lab abnormality was hypocalcemia  Patient had a witnessed tonic-clonic seizure while admitted and rapid response was called    However seizure aborted spontaneously without intervention  However there was some sequela of lip-smacking motions as well as not answering appropriately to voice  Patient was deemed unacceptable to protect her airway and was intubated  INR was supratherapeutic at 10 however was question whether there was a lab error and was repeated to be at 6  She received 4 mg of Ativan as well as another repeat CT head without contrast   CT again noted to not have any acute changes  Patient was started on Keppra in the ICU as well as keofed tube feedings was initiated  Telemetry monitoring shows some PVCs and lab abnormalities included magnesium of 0 5 as well as calcium  Both magnesium and calcium were repeated  Her hemoglobin has been low ranging in the 9, she went as low as 7 5 and today 8 1  INR today is 3  Patient was extubated in the ICU and was stable to transfer to [facility] with telemetry  Subjective:  Patient seen examined at bedside  Patient does not answer appropriately to questions  Patient is alert to person only and when asked where she is she says [facility]  Any other review of system she would also say [facility]  Unable to assess response at this moment  Objective:   Vitals:    04/30/18 1147 04/30/18 1200 04/30/18 1300 04/30/18 1434   BP:  144/90 121/58    Pulse:  74 86    Resp:  20 22    Temp:  98 2 °F (36 8 °C)     TempSrc:  Oral     SpO2: 97% 96% 97% 96%   Weight:       Height:         I/O last 24 hours: In: 3289 9 [I V :2045  2; NG/GT:598; IV Piggyback:646 7]  Out: 875 [Urine:875]    BP (!) 185/93 (BP Location: Right arm)   Pulse (!) 109   Temp 99 3 °F (37 4 °C) (Oral)   Resp (!) 28   Ht 5' 4" (1 626 m)   Wt 75 3 kg (166 lb 0 1 oz)   LMP  (LMP Unknown)   SpO2 93%   BMI 28 49 kg/m²   General appearance: alert and slowed mentation  Head: Normocephalic, without obvious abnormality, atraumatic  Eyes: conjunctivae/corneas clear  PERRL, EOM's intact  Fundi benign    Lungs: crackles bibasilar  Heart: irregularly irregular rhythm  Extremities: venous stasis dermatitis noted- bilateral feet worse in the right  Pulses: 2+ and symmetric  Skin:  Chronic venous stasis noted in the right extremity is notably in the toes  Cold extremities and  hyperpigmented  Neurologic:  Patient does not answer appropriately to questions  Alert only to self  Pupil is equally round and reactive  Patient follows commands, however slow  Strength is intact in the upper extremities  Finger-to-nose test is sluggish and patient is slow in adjustment  Patient was unable to perform rapid alternating motions         Mallory Avitia MD

## 2018-04-30 NOTE — SOCIAL WORK
Pt was a <30 days readmission  Pt is a bundle pt  Pt was last admitted d/t stroke 4/16-4/19  Pt s/p extubation, alert but confused  CM called pt's son in law [phone] and made aware of CM role at d/c  Sayra Harry reported that pt was alert and oriented prior to admission  Sayra Harry reported that pt lives with him and his wife [phone] in a bilevel house with 8 JUSTINO and 4 steps to the 2nd level bedroom and bathroom  Sayra Harry reported that when pt was d/c home last admission, pt was independent with all ADL's and ambulation  Sayra Harry reported that the last couple of days, pt became more weak and needs help with ADL's, transfer and ambulation  Pt uses a RW with ambulation  Sayra Harry reported that he works full time at home and his wife Evert does the same  Pt's family tyransport her to her appts  Pt was current with [facility] for PT, OT and nsg  Pt also has a HHA with [facility] 1x a week to help with ADL's  Pt's primary contact are her two sons, [phone] and [phone]  CM reviewed pt's medical records from last admission  Pt has no LW or POA  PCP is Dr Lali Manzo  Pharmacy is [facility] in [place]  Pt has hx with MV and [facility] for STR  Pt has no hx with alc, drug and psych tx  Pt has transportation when d/c  CM will follow with pt's d/c needs

## 2018-04-30 NOTE — RESPIRATORY THERAPY NOTE
RT Ventilator Management Note  Nash Guzman [de-identified] y o  female MRN: [id number]  Unit/Bed#: ICU 03 Encounter: [id number]      Daily Screen       4/29/2018 1100             Patient safety screen outcome[de-identified] Failed    Not Ready for Weaning due to[de-identified] Poor inspiratory effort            Physical Exam:   Assessment Type: Assess only  General Appearance: Alert, Awake  Respiratory Pattern: Assisted  Chest Assessment: Chest expansion symmetrical  Bilateral Breath Sounds: Clear  R Breath Sounds: Clear  L Breath Sounds: Clear  Cough: None  Suction: ET Tube  O2 Device: ventilator      Resp Comments: pt placed on cpap/ps wean, pt awake alert following commands, will continue to monitor

## 2018-05-01 ENCOUNTER — APPOINTMENT (INPATIENT)
Dept: NON INVASIVE DIAGNOSTICS | Facility: HOSPITAL | Age: 78
DRG: 056 | End: 2018-05-01
Payer: MEDICARE

## 2018-05-01 ENCOUNTER — APPOINTMENT (INPATIENT)
Dept: RADIOLOGY | Facility: HOSPITAL | Age: 78
DRG: 056 | End: 2018-05-01
Payer: MEDICARE

## 2018-05-01 PROBLEM — E87.2 LACTIC ACIDOSIS: Status: ACTIVE | Noted: 2018-05-01

## 2018-05-01 PROBLEM — R65.20 SEVERE SEPSIS (HCC): Status: ACTIVE | Noted: 2018-05-01

## 2018-05-01 PROBLEM — A41.9 SEPSIS (HCC): Status: ACTIVE | Noted: 2018-05-01

## 2018-05-01 LAB
ALBUMIN SERPL BCP-MCNC: 2.7 G/DL (ref 3.5–5)
ALP SERPL-CCNC: 95 U/L (ref 46–116)
ALT SERPL W P-5'-P-CCNC: 23 U/L (ref 12–78)
ANION GAP SERPL CALCULATED.3IONS-SCNC: 10 MMOL/L (ref 4–13)
ANION GAP SERPL CALCULATED.3IONS-SCNC: 7 MMOL/L (ref 4–13)
ANION GAP SERPL CALCULATED.3IONS-SCNC: 8 MMOL/L (ref 4–13)
ANISOCYTOSIS BLD QL SMEAR: PRESENT
ARTERIAL PATENCY WRIST A: YES
AST SERPL W P-5'-P-CCNC: 44 U/L (ref 5–45)
ATRIAL RATE: 133 BPM
ATRIAL RATE: 133 BPM
ATRIAL RATE: 95 BPM
ATRIAL RATE: 96 BPM
BASE EXCESS BLDA CALC-SCNC: -6.8 MMOL/L
BILIRUB SERPL-MCNC: 0.79 MG/DL (ref 0.2–1)
BUN SERPL-MCNC: 11 MG/DL (ref 5–25)
BUN SERPL-MCNC: 12 MG/DL (ref 5–25)
BUN SERPL-MCNC: 13 MG/DL (ref 5–25)
CALCIUM SERPL-MCNC: 6.7 MG/DL (ref 8.3–10.1)
CALCIUM SERPL-MCNC: 7 MG/DL (ref 8.3–10.1)
CALCIUM SERPL-MCNC: 7.2 MG/DL (ref 8.3–10.1)
CHLORIDE SERPL-SCNC: 106 MMOL/L (ref 100–108)
CHLORIDE SERPL-SCNC: 108 MMOL/L (ref 100–108)
CHLORIDE SERPL-SCNC: 111 MMOL/L (ref 100–108)
CO2 SERPL-SCNC: 22 MMOL/L (ref 21–32)
CO2 SERPL-SCNC: 26 MMOL/L (ref 21–32)
CO2 SERPL-SCNC: 26 MMOL/L (ref 21–32)
CREAT SERPL-MCNC: 0.66 MG/DL (ref 0.6–1.3)
CREAT SERPL-MCNC: 0.85 MG/DL (ref 0.6–1.3)
CREAT SERPL-MCNC: 0.86 MG/DL (ref 0.6–1.3)
EOSINOPHIL # BLD AUTO: 0.44 THOUSAND/UL (ref 0–0.61)
EOSINOPHIL NFR BLD MANUAL: 3 % (ref 0–6)
ERYTHROCYTE [DISTWIDTH] IN BLOOD BY AUTOMATED COUNT: 19.1 % (ref 11.6–15.1)
ERYTHROCYTE [DISTWIDTH] IN BLOOD BY AUTOMATED COUNT: 19.2 % (ref 11.6–15.1)
FERRITIN SERPL-MCNC: 46 NG/ML (ref 8–388)
GFR SERPL CREATININE-BSD FRML MDRD: 64 ML/MIN/1.73SQ M
GFR SERPL CREATININE-BSD FRML MDRD: 65 ML/MIN/1.73SQ M
GFR SERPL CREATININE-BSD FRML MDRD: 84 ML/MIN/1.73SQ M
GLUCOSE SERPL-MCNC: 127 MG/DL (ref 65–140)
GLUCOSE SERPL-MCNC: 144 MG/DL (ref 65–140)
GLUCOSE SERPL-MCNC: 278 MG/DL (ref 65–140)
GLUCOSE SERPL-MCNC: 315 MG/DL (ref 65–140)
GLUCOSE SERPL-MCNC: 322 MG/DL (ref 65–140)
GLUCOSE SERPL-MCNC: 339 MG/DL (ref 65–140)
GLUCOSE SERPL-MCNC: 370 MG/DL (ref 65–140)
HCO3 BLDA-SCNC: 19.3 MMOL/L (ref 22–28)
HCT VFR BLD AUTO: 28.4 % (ref 34.8–46.1)
HCT VFR BLD AUTO: 29.9 % (ref 34.8–46.1)
HGB BLD-MCNC: 8.3 G/DL (ref 11.5–15.4)
HGB BLD-MCNC: 8.4 G/DL (ref 11.5–15.4)
HGB BLD-MCNC: 9 G/DL (ref 11.5–15.4)
INR PPP: 1.61 (ref 0.86–1.16)
INR PPP: 1.75 (ref 0.86–1.16)
IRON SATN MFR SERPL: 7 %
IRON SERPL-MCNC: 16 UG/DL (ref 50–170)
LACTATE SERPL-SCNC: 3.2 MMOL/L (ref 0.5–2)
LYMPHOCYTES # BLD AUTO: 17 %
LYMPHOCYTES # BLD AUTO: 2.51 THOUSAND/UL (ref 0.6–4.47)
MAGNESIUM SERPL-MCNC: 1.6 MG/DL (ref 1.6–2.6)
MAGNESIUM SERPL-MCNC: 1.7 MG/DL (ref 1.6–2.6)
MAGNESIUM SERPL-MCNC: 1.8 MG/DL (ref 1.6–2.6)
MAGNESIUM SERPL-MCNC: 2.2 MG/DL (ref 1.6–2.6)
MCH RBC QN AUTO: 20.6 PG (ref 26.8–34.3)
MCH RBC QN AUTO: 20.8 PG (ref 26.8–34.3)
MCHC RBC AUTO-ENTMCNC: 29.6 G/DL (ref 31.4–37.4)
MCHC RBC AUTO-ENTMCNC: 30.1 G/DL (ref 31.4–37.4)
MCV RBC AUTO: 69 FL (ref 82–98)
MCV RBC AUTO: 70 FL (ref 82–98)
MONOCYTES # BLD AUTO: 1.03 THOUSAND/UL (ref 0–1.22)
MONOCYTES NFR BLD AUTO: 7 % (ref 4–12)
NEUTS SEG NFR BLD AUTO: 73 %
NON VENT ROOM AIR: 100 %
O2 CT BLDA-SCNC: 12.9 ML/DL (ref 16–23)
OXYHGB MFR BLDA: 91.1 % (ref 94–97)
P AXIS: 59 DEGREES
P AXIS: 67 DEGREES
PCO2 BLDA: 40.5 MM HG (ref 36–44)
PH BLDA: 7.29 [PH] (ref 7.35–7.45)
PHOSPHATE SERPL-MCNC: 2.6 MG/DL (ref 2.3–4.1)
PHOSPHATE SERPL-MCNC: 2.9 MG/DL (ref 2.3–4.1)
PLATELET # BLD AUTO: 239 THOUSANDS/UL (ref 149–390)
PLATELET # BLD AUTO: 318 THOUSANDS/UL (ref 149–390)
PLATELET BLD QL SMEAR: ADEQUATE
PMV BLD AUTO: 10.1 FL (ref 8.9–12.7)
PMV BLD AUTO: 9.9 FL (ref 8.9–12.7)
PO2 BLDA: 74 MM HG (ref 75–129)
POIKILOCYTOSIS BLD QL SMEAR: PRESENT
POTASSIUM SERPL-SCNC: 4 MMOL/L (ref 3.5–5.3)
POTASSIUM SERPL-SCNC: 4.1 MMOL/L (ref 3.5–5.3)
POTASSIUM SERPL-SCNC: 4.3 MMOL/L (ref 3.5–5.3)
PR INTERVAL: 221 MS
PR INTERVAL: 225 MS
PROCALCITONIN SERPL-MCNC: 0.1 NG/ML
PROT SERPL-MCNC: 6.8 G/DL (ref 6.4–8.2)
PROTHROMBIN TIME: 19.3 SECONDS (ref 12.1–14.4)
PROTHROMBIN TIME: 20.6 SECONDS (ref 12.1–14.4)
QRS AXIS: -26 DEGREES
QRS AXIS: -31 DEGREES
QRS AXIS: -53 DEGREES
QRS AXIS: -54 DEGREES
QRSD INTERVAL: 138 MS
QRSD INTERVAL: 142 MS
QRSD INTERVAL: 144 MS
QRSD INTERVAL: 146 MS
QT INTERVAL: 362 MS
QT INTERVAL: 366 MS
QT INTERVAL: 392 MS
QT INTERVAL: 392 MS
QTC INTERVAL: 493 MS
QTC INTERVAL: 496 MS
QTC INTERVAL: 526 MS
QTC INTERVAL: 544 MS
RBC # BLD AUTO: 4.08 MILLION/UL (ref 3.81–5.12)
RBC # BLD AUTO: 4.33 MILLION/UL (ref 3.81–5.12)
SODIUM SERPL-SCNC: 140 MMOL/L (ref 136–145)
SODIUM SERPL-SCNC: 140 MMOL/L (ref 136–145)
SODIUM SERPL-SCNC: 144 MMOL/L (ref 136–145)
SPECIMEN SOURCE: ABNORMAL
T WAVE AXIS: 104 DEGREES
T WAVE AXIS: 107 DEGREES
T WAVE AXIS: 108 DEGREES
T WAVE AXIS: 99 DEGREES
TIBC SERPL-MCNC: 246 UG/DL (ref 250–450)
TOTAL CELLS COUNTED SPEC: 100
TROPONIN I SERPL-MCNC: 0.03 NG/ML
VENTRICULAR RATE: 127 BPM
VENTRICULAR RATE: 133 BPM
VENTRICULAR RATE: 95 BPM
VENTRICULAR RATE: 96 BPM
WBC # BLD AUTO: 14.78 THOUSAND/UL (ref 4.31–10.16)
WBC # BLD AUTO: 7.35 THOUSAND/UL (ref 4.31–10.16)

## 2018-05-01 PROCEDURE — G8987 SELF CARE CURRENT STATUS: HCPCS

## 2018-05-01 PROCEDURE — 85007 BL SMEAR W/DIFF WBC COUNT: CPT | Performed by: INTERNAL MEDICINE

## 2018-05-01 PROCEDURE — 85027 COMPLETE CBC AUTOMATED: CPT | Performed by: INTERNAL MEDICINE

## 2018-05-01 PROCEDURE — 94660 CPAP INITIATION&MGMT: CPT | Performed by: SOCIAL WORKER

## 2018-05-01 PROCEDURE — 84145 PROCALCITONIN (PCT): CPT | Performed by: INTERNAL MEDICINE

## 2018-05-01 PROCEDURE — 97167 OT EVAL HIGH COMPLEX 60 MIN: CPT

## 2018-05-01 PROCEDURE — 85610 PROTHROMBIN TIME: CPT | Performed by: INTERNAL MEDICINE

## 2018-05-01 PROCEDURE — 85027 COMPLETE CBC AUTOMATED: CPT | Performed by: PHYSICIAN ASSISTANT

## 2018-05-01 PROCEDURE — 93306 TTE W/DOPPLER COMPLETE: CPT

## 2018-05-01 PROCEDURE — 94762 N-INVAS EAR/PLS OXIMTRY CONT: CPT | Performed by: SOCIAL WORKER

## 2018-05-01 PROCEDURE — 82805 BLOOD GASES W/O2 SATURATION: CPT | Performed by: INTERNAL MEDICINE

## 2018-05-01 PROCEDURE — 83605 ASSAY OF LACTIC ACID: CPT | Performed by: INTERNAL MEDICINE

## 2018-05-01 PROCEDURE — G8978 MOBILITY CURRENT STATUS: HCPCS

## 2018-05-01 PROCEDURE — 84100 ASSAY OF PHOSPHORUS: CPT | Performed by: INTERNAL MEDICINE

## 2018-05-01 PROCEDURE — 99232 SBSQ HOSP IP/OBS MODERATE 35: CPT | Performed by: PSYCHIATRY & NEUROLOGY

## 2018-05-01 PROCEDURE — 99233 SBSQ HOSP IP/OBS HIGH 50: CPT | Performed by: INTERNAL MEDICINE

## 2018-05-01 PROCEDURE — 92526 ORAL FUNCTION THERAPY: CPT

## 2018-05-01 PROCEDURE — 80048 BASIC METABOLIC PNL TOTAL CA: CPT | Performed by: INTERNAL MEDICINE

## 2018-05-01 PROCEDURE — 71045 X-RAY EXAM CHEST 1 VIEW: CPT

## 2018-05-01 PROCEDURE — 94640 AIRWAY INHALATION TREATMENT: CPT

## 2018-05-01 PROCEDURE — 85018 HEMOGLOBIN: CPT | Performed by: INTERNAL MEDICINE

## 2018-05-01 PROCEDURE — 82948 REAGENT STRIP/BLOOD GLUCOSE: CPT

## 2018-05-01 PROCEDURE — 94760 N-INVAS EAR/PLS OXIMETRY 1: CPT

## 2018-05-01 PROCEDURE — 80053 COMPREHEN METABOLIC PANEL: CPT | Performed by: INTERNAL MEDICINE

## 2018-05-01 PROCEDURE — 87040 BLOOD CULTURE FOR BACTERIA: CPT | Performed by: INTERNAL MEDICINE

## 2018-05-01 PROCEDURE — 99291 CRITICAL CARE FIRST HOUR: CPT | Performed by: INTERNAL MEDICINE

## 2018-05-01 PROCEDURE — 83550 IRON BINDING TEST: CPT | Performed by: INTERNAL MEDICINE

## 2018-05-01 PROCEDURE — 93306 TTE W/DOPPLER COMPLETE: CPT | Performed by: INTERNAL MEDICINE

## 2018-05-01 PROCEDURE — 93005 ELECTROCARDIOGRAM TRACING: CPT

## 2018-05-01 PROCEDURE — 93010 ELECTROCARDIOGRAM REPORT: CPT | Performed by: INTERNAL MEDICINE

## 2018-05-01 PROCEDURE — 94760 N-INVAS EAR/PLS OXIMETRY 1: CPT | Performed by: SOCIAL WORKER

## 2018-05-01 PROCEDURE — 94640 AIRWAY INHALATION TREATMENT: CPT | Performed by: SOCIAL WORKER

## 2018-05-01 PROCEDURE — 83735 ASSAY OF MAGNESIUM: CPT | Performed by: INTERNAL MEDICINE

## 2018-05-01 PROCEDURE — 84484 ASSAY OF TROPONIN QUANT: CPT | Performed by: INTERNAL MEDICINE

## 2018-05-01 PROCEDURE — G8979 MOBILITY GOAL STATUS: HCPCS

## 2018-05-01 PROCEDURE — 36600 WITHDRAWAL OF ARTERIAL BLOOD: CPT | Performed by: SOCIAL WORKER

## 2018-05-01 PROCEDURE — G8988 SELF CARE GOAL STATUS: HCPCS

## 2018-05-01 PROCEDURE — 83540 ASSAY OF IRON: CPT | Performed by: INTERNAL MEDICINE

## 2018-05-01 PROCEDURE — 82728 ASSAY OF FERRITIN: CPT | Performed by: INTERNAL MEDICINE

## 2018-05-01 PROCEDURE — 83735 ASSAY OF MAGNESIUM: CPT | Performed by: PHYSICIAN ASSISTANT

## 2018-05-01 PROCEDURE — 97163 PT EVAL HIGH COMPLEX 45 MIN: CPT

## 2018-05-01 RX ORDER — ALBUTEROL SULFATE 2.5 MG/3ML
2.5 SOLUTION RESPIRATORY (INHALATION) EVERY 4 HOURS PRN
Status: DISCONTINUED | OUTPATIENT
Start: 2018-05-01 | End: 2018-05-07

## 2018-05-01 RX ORDER — ACETAMINOPHEN 650 MG/1
650 SUPPOSITORY RECTAL EVERY 4 HOURS PRN
Status: DISCONTINUED | OUTPATIENT
Start: 2018-05-01 | End: 2018-05-07 | Stop reason: HOSPADM

## 2018-05-01 RX ORDER — MAGNESIUM SULFATE HEPTAHYDRATE 40 MG/ML
2 INJECTION, SOLUTION INTRAVENOUS ONCE
Status: COMPLETED | OUTPATIENT
Start: 2018-05-01 | End: 2018-05-01

## 2018-05-01 RX ORDER — ALBUTEROL SULFATE 2.5 MG/3ML
SOLUTION RESPIRATORY (INHALATION)
Status: COMPLETED
Start: 2018-05-01 | End: 2018-05-01

## 2018-05-01 RX ORDER — ASPIRIN 81 MG/1
81 TABLET, CHEWABLE ORAL DAILY
Status: DISCONTINUED | OUTPATIENT
Start: 2018-05-01 | End: 2018-05-07 | Stop reason: HOSPADM

## 2018-05-01 RX ORDER — ALBUTEROL SULFATE 2.5 MG/3ML
2.5 SOLUTION RESPIRATORY (INHALATION) EVERY 6 HOURS PRN
Status: DISCONTINUED | OUTPATIENT
Start: 2018-05-01 | End: 2018-05-01

## 2018-05-01 RX ORDER — ALBUTEROL SULFATE 2.5 MG/3ML
SOLUTION RESPIRATORY (INHALATION)
Status: DISPENSED
Start: 2018-05-01 | End: 2018-05-01

## 2018-05-01 RX ORDER — FUROSEMIDE 10 MG/ML
40 INJECTION INTRAMUSCULAR; INTRAVENOUS ONCE
Status: COMPLETED | OUTPATIENT
Start: 2018-05-01 | End: 2018-05-01

## 2018-05-01 RX ADMIN — LORATADINE 10 MG: 10 TABLET ORAL at 09:08

## 2018-05-01 RX ADMIN — LEVOTHYROXINE SODIUM 100 MCG: 100 TABLET ORAL at 05:26

## 2018-05-01 RX ADMIN — NYSTATIN: 100000 POWDER TOPICAL at 09:06

## 2018-05-01 RX ADMIN — ALBUTEROL SULFATE 2.5 MG: 2.5 SOLUTION RESPIRATORY (INHALATION) at 09:18

## 2018-05-01 RX ADMIN — LABETALOL 20 MG/4 ML (5 MG/ML) INTRAVENOUS SYRINGE 10 MG: at 16:25

## 2018-05-01 RX ADMIN — FUROSEMIDE 40 MG: 10 INJECTION, SOLUTION INTRAMUSCULAR; INTRAVENOUS at 16:31

## 2018-05-01 RX ADMIN — LEVETIRACETAM 750 MG: 100 INJECTION, SOLUTION INTRAVENOUS at 09:00

## 2018-05-01 RX ADMIN — LEVETIRACETAM 750 MG: 100 INJECTION, SOLUTION INTRAVENOUS at 22:56

## 2018-05-01 RX ADMIN — NYSTATIN: 100000 POWDER TOPICAL at 19:19

## 2018-05-01 RX ADMIN — INSULIN LISPRO 5 UNITS: 100 INJECTION, SOLUTION INTRAVENOUS; SUBCUTANEOUS at 19:19

## 2018-05-01 RX ADMIN — MAGNESIUM SULFATE HEPTAHYDRATE 2 G: 40 INJECTION, SOLUTION INTRAVENOUS at 14:10

## 2018-05-01 RX ADMIN — Medication 20 MG: at 09:07

## 2018-05-01 RX ADMIN — METOPROLOL TARTRATE 50 MG: 50 TABLET ORAL at 09:06

## 2018-05-01 RX ADMIN — INSULIN LISPRO 3 UNITS: 100 INJECTION, SOLUTION INTRAVENOUS; SUBCUTANEOUS at 23:29

## 2018-05-01 RX ADMIN — FLUTICASONE PROPIONATE 1 PUFF: 110 AEROSOL, METERED RESPIRATORY (INHALATION) at 09:07

## 2018-05-01 RX ADMIN — VANCOMYCIN HYDROCHLORIDE 1250 MG: 10 INJECTION, POWDER, LYOPHILIZED, FOR SOLUTION INTRAVENOUS at 19:18

## 2018-05-01 RX ADMIN — INSULIN LISPRO 2 UNITS: 100 INJECTION, SOLUTION INTRAVENOUS; SUBCUTANEOUS at 11:44

## 2018-05-01 RX ADMIN — ASPIRIN 81 MG 81 MG: 81 TABLET ORAL at 11:45

## 2018-05-01 RX ADMIN — CEFEPIME HYDROCHLORIDE 2000 MG: 2 INJECTION, POWDER, FOR SOLUTION INTRAVENOUS at 18:41

## 2018-05-01 RX ADMIN — ALBUTEROL SULFATE 2.5 MG: 2.5 SOLUTION RESPIRATORY (INHALATION) at 04:51

## 2018-05-01 RX ADMIN — METRONIDAZOLE 500 MG: 500 INJECTION, SOLUTION INTRAVENOUS at 19:31

## 2018-05-01 RX ADMIN — MAGNESIUM SULFATE HEPTAHYDRATE 2 G: 40 INJECTION, SOLUTION INTRAVENOUS at 06:33

## 2018-05-01 RX ADMIN — CALCIUM GLUCONATE 2 G: 98 INJECTION, SOLUTION INTRAVENOUS at 06:33

## 2018-05-01 NOTE — PHYSICAL THERAPY NOTE
Physical Therapy Evaluation    Patient's Name: Curtis Matos    Admitting Diagnosis  Vomiting [R11 10]  Altered mental status [R41 82]  Intractable vomiting [R11 10]    Problem List  Patient Active Problem List   Diagnosis    Essential hypertension    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 native heart with angina pectoris ([facility] )    Type 2 diabetes mellitus with complication, with long-term current use of insulin (HCC)    Atrial flutter (HCC)    Hyperlipidemia    Gastroesophageal reflux disease without esophagitis    Moderate mitral stenosis    Arthritis of hand    Asthma    Asymptomatic carotid artery stenosis, bilateral    Atherosclerosis of artery of extremity with ulceration ([facility] )    Hx of CABG    Chronic anticoagulation    Chronic diastolic congestive heart failure ([facility] )    Degenerative joint disease involving multiple joints    Diabetic retinopathy (HCC)    Postoperative hypothyroidism    Migraine headache    Nephrolithiasis    Osteoarthritis of both knees    Paroxysmal atrial fibrillation (HCC)    Ulcer of toe of left foot (HCC)    Ulcer of toe of right foot (HCC)    Prolonged Q-T interval on ECG    Hypokalemia    Hypocalcemia    Left bundle branch block (LBBB)    1st degree AV block    S/P TAVR (transcatheter aortic valve replacement)    Expressive aphasia    Multiple lacunar infarcts (HCC)    Vomiting    History of CVA (cerebrovascular accident)    Seizure ([facility] )    Supratherapeutic INR    PVC (premature ventricular contraction)    Pulmonary hypertension (HCC)    Anemia       Past Medical History  Past Medical History:   Diagnosis Date    Altered mental status     Anticoagulated     Coumadin for Aflutter    Asthma     Atrial flutter (HCC)     maintained on coumadin anticoag    CAD (coronary artery disease)     Candidiasis     Carotid stenosis, bilateral     Cataract     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) ([facility])     Chronic fatigue syndrome     Chronic low back pain     Concussion w loss of consciousness of unsp duration, init     Coronary artery disease     CVA (cerebral vascular accident) ([facility] ) 4/17/2018    Diabetes mellitus ([facility] )     type 2, insulin dependent    DJD (degenerative joint disease)     GERD (gastroesophageal reflux disease)     Herpes zoster     HLD (hyperlipidemia)     HTN (hypertension)     Hyperlipidemia     Hypothyroidism     Irritable bowel syndrome     Lumbar radiculopathy     Lyme disease     Dx in hospital 8/2011    Lyme disease     MI (myocardial infarction) ([facility] )     Migraines     Muscle spasm     Non-neoplastic nevus     Nontoxic multinodular goiter     OA (osteoarthritis)     Osteoarthritis     Other chronic pain     PAD (peripheral artery disease) ([facility])     Palpitations     Pseudogout     Spinal stenosis     Transient cerebral ischemia     Trigger ring finger     Viral gastroenteritis        Past Surgical History  Past Surgical History:   Procedure Laterality Date    APPENDECTOMY      ARTERIOGRAM  12/19/2017    CARDIAC CATHETERIZATION      CHOLECYSTECTOMY      COLONOSCOPY      CORONARY ARTERY BYPASS GRAFT  2004    LUMBAR LAMINECTOMY      NH ECHO TRANSESOPHAG R-T 2D W/PRB IMG ACQUISJ I&R N/A 4/3/2018    Procedure: TRANSESOPHAGEAL ECHOCARDIOGRAM (ROBB);   Surgeon: Mauro Haley DO;  [facility]: [facility];  Service: Cardiac Surgery    NH REPLACE AORTIC VALVE OPENFEMORAL ARTERY APPROACH N/A 4/3/2018    Procedure: REPLACEMENT AORTIC VALVE TRANSCATHETER (TAVR) TRANSFEMORAL w/ 26MM IVY YEVGENIY S3 VALVE;  Surgeon: Mauro Haley DO;  [facility]: [facility];  Service: Cardiac Surgery    THYROIDECTOMY      TOTAL ABDOMINAL HYSTERECTOMY W/ BILATERAL SALPINGOOPHORECTOMY            05/01/18 1145   Note Type   Note type Eval only   Pain Assessment   Pain Assessment 0-10   Pain Score No Pain   Home Living   Type of Home House   Additional Comments Resides w/ dtr, HUBERT in 2 story home, w/ ability to stay on 1 floor  Indep self care, ambulates w/ RW   Prior Function   Level of [place] Independent with ADLs and functional mobility   Lives With Family   Receives Help From [facility] ([place], [place]) in the last 6 months 0   Restrictions/Precautions   Weight Bearing Precautions Per Order No   Other Precautions Cognitive; Chair Alarm; Bed Alarm;Multiple lines;O2;Telemetry; Fall Risk   General   Family/Caregiver Present No   Cognition   Overall Cognitive Status Impaired   Arousal/Participation Responsive   Attention Attends with cues to redirect   Orientation Level Oriented to person   Memory Unable to assess   Following Commands Follows one step commands inconsistently   RLE Assessment   RLE Assessment (strength grossly 3+/5)   LLE Assessment   LLE Assessment (strength grossly 4-/5)   Coordination   Movements are Fluid and Coordinated 0   Coordination and Movement Description R > L sided ataxia   Bed Mobility   Supine to Sit 3  Moderate assistance   Additional items Assist x 1   Transfers   Sit to Stand 3  Moderate assistance   Additional items Assist x 2   Stand to Sit 3  Moderate assistance   Additional items Assist x 2   Ambulation/Elevation   Gait pattern (ataxia, decreased R LE clearance w/ scissoring,)   Gait Assistance 3  Moderate assist   Additional items Assist x 2   Assistive Device Rolling walker  (cues for sequence)   Distance 4-5'x1 from bed to chair    Near fall due to excessive L LE scissoring   Balance   Static Sitting Fair -   Dynamic Sitting Poor +   Static Standing Poor   Dynamic Standing Poor   Ambulatory Poor -   Endurance Deficit   Endurance Deficit Yes   Endurance Deficit Description fatigue, weakness, cog   Activity Tolerance   Activity Tolerance Patient limited by fatigue;Treatment limited secondary to medical complications (Comment)   Nurse Made Aware yes   Assessment   Prognosis Fair   Problem List Decreased strength;Decreased endurance;Decreased mobility; Impaired balance;Decreased coordination;Decreased cognition; Impaired judgement;Decreased safety awareness   Assessment Pt seen for high complexity physical therapy evaluation  Pt is an [de-identified] y/o female w/ history/comorbidities of IDDM, hypothyroidism, PAD, asthma, PAF, CAD, CABG, AS , TAVR, recent CVA (bihemisphere), Lyme dz, MI, OA who is now admitted w/ weakness, decline in MS, vomiting  Sz and intubated in ED, sent to ICU  Now extubated, and w/ dx including acute encephalopathy, sz, VDRF  Undergoing w/u  Due to unclear medical dx, w/ fall risk, sognificant cog changes, need for supplimental O2 w/ SOB throughout session, note unstable clinical picture  PT consulted to assess mobility, d/c needs  Pt presents w/ decreased functional mob, standing and sitting balance, endurance, B LE strength, barriers at home  will benefit from skilled PT to correct for the above problems  Recommend rehab at d/c   Goals   Patient Goals none stated   STG Expiration Date 05/15/18   Short Term Goal #1 1-2 wks: bed mob and transfers w/ S, sitting balance to good dynamically, standing balance to fair + w/ device, ambulate  ft w/ RW and CGA/S, increase B LE strength by 1/2 -1 grade   Treatment Day 0   Plan   Treatment/Interventions Functional transfer training;LE strengthening/ROM; Therapeutic exercise; Endurance training;Patient/family training;Equipment eval/education; Bed mobility;Gait training   PT Frequency 5x/wk   Recommendation   Recommendation (recommend rehab at d/c)   Equipment Recommended Mick Singleton   PT - OK to Discharge Yes  (to rehab when stable)   Modified Cochran Scale   Modified Cochran Scale 4   Barthel Index   Feeding 5   Bathing 0   Grooming Score 0   Dressing Score 5   Bladder Score 10   Bowels Score 10   Toilet Use Score 5   Transfers (Bed/Chair) Score 5   Mobility (Level Surface) Score 0   Stairs Score 0   Barthel Index Score 40         Abdirizak Bhatt PT, DPT, CSRS

## 2018-05-01 NOTE — PLAN OF CARE
Problem: PHYSICAL THERAPY ADULT  Goal: Performs mobility at highest level of function for planned discharge setting  See evaluation for individualized goals  Treatment/Interventions: Functional transfer training, LE strengthening/ROM, Therapeutic exercise, Endurance training, Patient/family training, Equipment eval/education, Bed mobility, Gait training  Equipment Recommended: Geoffrey Curran       See flowsheet documentation for full assessment, interventions and recommendations  Prognosis: Fair  Problem List: Decreased strength, Decreased endurance, Decreased mobility, Impaired balance, Decreased coordination, Decreased cognition, Impaired judgement, Decreased safety awareness  Assessment: Pt seen for high complexity physical therapy evaluation  Pt is an [de-identified] y/o female w/ history/comorbidities of IDDM, hypothyroidism, PAD, asthma, PAF, CAD, CABG, AS , TAVR, recent CVA (bihemisphere), Lyme dz, MI, OA who is now admitted w/ weakness, decline in MS, vomiting  Sz and intubated in ED, sent to ICU  Now extubated, and w/ dx including acute encephalopathy, sz, VDRF  Undergoing w/u  Due to unclear medical dx, w/ fall risk, sognificant cog changes, need for supplimental O2 w/ SOB throughout session, note unstable clinical picture  PT consulted to assess mobility, d/c needs  Pt presents w/ decreased functional mob, standing and sitting balance, endurance, B LE strength, barriers at home  will benefit from skilled PT to correct for the above problems  Recommend rehab at d/c        Recommendation:  (recommend rehab at d/c)     PT - OK to Discharge: (S) Yes (to rehab when stable)    See flowsheet documentation for full assessment

## 2018-05-01 NOTE — SEPSIS NOTE
Sepsis Note   Salvador Shah [de-identified] y o  female MRN: [id number]  Unit/Bed#: [facility] 733-01 Encounter: [id number]            Initial Sepsis Screening     Row Name 05/01/18 1704                Is the patient's history suggestive of a new or worsening infection? (!)  Yes (Proceed)  -PS        Suspected source of infection pneumonia  -PS        Are two or more of the following signs & symptoms of infection both present and new to the patient? (!)  Yes (Proceed)  -PS        Indicate SIRS criteria Hyperthemia > 38 3C (100 9F); Tachypnea > 20 resp per min;Leukocytosis (WBC > 24413 IJL); Tachycardia > 90 bpm  -PS        If the answer is yes to both questions, suspicion of sepsis is present          If severe sepsis is present AND tissue hypoperfusion perists in the hour after fluid resuscitation or lactate > 4, the patient meets criteria for SEPTIC SHOCK          Are any of the following organ dysfunction criteria present within 6 hours of suspected infection and SIRS criteria that are NOT considered to be chronic conditions? (!)  Yes  -PS        Organ dysfunction    awaiting labs  -PS        Date of presentation of severe sepsis 05/01/18  -PS        Time of presentation of severe sepsis 1705  -PS        Tissue hypoperfusion persists in the hour after crystalloid fluid administration, evidenced, by either:          Was hypotension present within one hour of the conclusion of crystalloid fluid administration?           Date of presentation of septic shock          Time of presentation of septic shock            User Key  (r) = Recorded By, (t) = Taken By, (c) = Cosigned By    [address] Name Provider Type    DO Wil Wesley

## 2018-05-01 NOTE — PLAN OF CARE
Problem: OCCUPATIONAL THERAPY ADULT  Goal: Performs self-care activities at highest level of function for planned discharge setting  See evaluation for individualized goals  Treatment Interventions: ADL retraining, Functional transfer training, Endurance training, Cognitive reorientation, Patient/family training, Equipment evaluation/education, Compensatory technique education, Continued evaluation, Energy conservation, Activityengagement          See flowsheet documentation for full assessment, interventions and recommendations  Limitation: Decreased ADL status, Decreased Safe judgement during ADL, Decreased cognition, Decreased endurance, Decreased self-care trans, Decreased high-level ADLs  Prognosis: Fair  Assessment: Pt is an [de-identified] y/o female seen for OT eval s/p adm to [facility] w/ AMS, weakness, vomitting and loose stools  Pt w/ witnessed tonic-clonic seizure during morning rounds and required intubation and had postictal confusion  Pt is s/p extubation  Pt dx'd w/ acute encephalopathy, acute hypoxic respiratory failure, and vtach  Comorbidities include a h/o CVA, DM, COPD, hypothyroidism, PAD, a-fib, CAD s/p CABG, aortic stenosis s/p TAVR, atrial flutter, cataract, CHF, HTN, HLD, DJD, MI, and spinal stenosis  Pt with active OT orders and up with assistance orders  Pt lives with her son and DIL in a bilevel house w/ + JUSTINO  Pt was I w/  ADLS & required use of rw PTA  Pt is currently demonstrating the following occupational deficits: min A ADLS, mod A x2 functional transfers and mod A x2 functional mobility using rw for short distances   Pt with deficits and limitations in all baseline areas of occupation 2* SOB, significant fatigue, decreased cognition, decreased orientation, decreased processing, decreased safety awareness, decreased endurance/activity tolerance, decreased functional forward reach, decreased ADL status, impaired balance, decreased mobility status, impaired coordination, deconditioning and generalized weakness  The following Occupational Performance Areas to address include: grooming, bathing/shower, toilet hygiene, dressing, medication management, health maintenance, functional mobility, community mobility, clothing management, meal prep and household maintenance  Pt scored overall 40/100 on the Barthel Index  Based on the aforementioned OT evaluation, functional performance deficits, and assessments, pt has been identified as a high complexity evaluation  Recommend STR upon D/C   Pt to continue to benefit from acute immediate OT services to address the following goals 3-5x/wk to  w/in 10-14 days:     OT Discharge Recommendation: Short Term Rehab  OT - OK to Discharge: Yes (to STR when medically stable)     Jaden Villanueva MS, OTR/L

## 2018-05-01 NOTE — PROGRESS NOTES
Notified by nurse that patient had 25-beat run read Vtach on telemetry  Tele with irregular wide-complex tachycardia rate 148 aberrency v  Vtach? Before/after NSR w/ bundle w/ 1st degree block  Patient slightly tachypnic per nurse at baseline, comfortable-appearing vitals stable denies any complaints  Will check and replete lytes, has required significant calcium/mag repletion with persistently low levels   Consideration for cards consult in AM

## 2018-05-01 NOTE — PROGRESS NOTES
Progress Note - Neurology   Matilde Ackerman [de-identified] y o  female MRN: [id number]  Unit/Bed#: [facility] 733-01 Encounter: [id number]    Assessment/Plan:   1 [de-identified]year old female admitted to the hospital with altered mental status and found to have CHF exacerbation  She was noted to have tonic clonic seizure during hospitalization   - Recent embolic pattern stroke in patient with atrial fibrillation  INR was noted to be therapeutic at that time and she was changed to Xarelto  MRI brain with seizure protocol pending to evaluate for new ischemic stroke  - Restart Xarelto today (INR 1 61 today)  - Continue on Keppra 750 mg Q12H     - Continue on atorvastatin 80 mg QPM/ASA 81 mg QD     - Echocardiogram, fasting lipid panel, hemoglobin A1c completed last month during admission for stroke, do not need to repeat at this time     - PT/OT/Speech therapy  - Stroke education    - Seizure precautions  - Monitor exam and notify with changes  Subjective:   Kamran Huizar is an [de-identified]year old female with PMH CAD, HTN, HLD, DM, CVA who was admitted with altered mental status and found to have CHF exacerbation  She was noted to have witnessed tonic-clonic seizure on 4/29 and was monitored on vEEG (no seizures noted)  She had been loaded with Keppra and started on maintenance dosing  MRI brain pending  She notes that she feels "terrible" but is unable to further explain why she feels that way  She denies HA, CP, SOB, N/V, abdominal pain, weakness, numbness, tingling       ROS:  Unable to reliably assess secondary to mental status    Medications  Scheduled Meds:  Current Facility-Administered Medications:  acetaminophen 650 mg Oral Q6H PRN Hyun Tyler, DO    albuterol        albuterol 2 5 mg Nebulization Q4H PRN Belkis Yoder MD    aspirin 81 mg Oral Daily Eduardo Cope, DO    atorvastatin 80 mg Oral Daily With Dinner Mario Toribio MD    fluticasone 1 puff Inhalation Q12H [facility] Vilma Bolaños MD    influenza vaccine 0 5 mL Intramuscular Prior to discharge Herlinda Chung MD    insulin lispro 1-5 Units Subcutaneous HS Vilma Bolaños MD    insulin lispro 1-5 Units Subcutaneous TID With Meals Kandace Lozano MD    labetalol 10 mg Intravenous Q6H PRN Elfegorosy Mcintosh Bashor, DO    levETIRAcetam 750 mg Intravenous Q12H [address] Kacey Zuleta MD Last Rate: 750 mg (05/01/18 0900)   levothyroxine 100 mcg Oral Daily Vilma Bolaños MD    loratadine 10 mg Oral Daily Vilma Bolaños MD    LORazepam 2 mg Intravenous PRN Elfegorosy Mcintosh Bashor, DO    metoclopramide 10 mg Intravenous Q6H PRN Kandace Lozano MD    metoprolol tartrate 75 mg Oral Q12H [address] Katiuska Muñoz, DO    nystatin  Topical BID Elfego Dayna Bashor, DO    omeprazole (PRILOSEC) suspension 2 mg/mL 20 mg Oral Daily Flaco S Bashotonny, DO    polyethylene glycol 17 g Oral Daily PRN Kacey Zuleta MD    rivaroxaban 20 mg Oral Daily With [employer], DO    senna-docusate sodium 1 tablet Oral HS Ashlie James PA-C      Continuous Infusions:   PRN Meds:   acetaminophen    albuterol    influenza vaccine    labetalol    LORazepam    metoclopramide    polyethylene glycol    Vitals: Blood pressure 152/77, pulse 98, temperature 98 6 °F (37 °C), temperature source Oral, resp  rate (!) 24, height 5' 4" (1 626 m), weight 75 7 kg (166 lb 14 2 oz), SpO2 95 %, not currently breastfeeding  ,Body mass index is 28 65 kg/m²  Physical Exam: /77 (BP Location: Right arm)   Pulse 98   Temp 98 6 °F (37 °C) (Oral)   Resp (!) 24   Ht 5' 4" (1 626 m)   Wt 75 7 kg (166 lb 14 2 oz)   LMP  (LMP Unknown)   SpO2 95%   BMI 28 65 kg/m²   General appearance: Awake and alert, in no acute distress  Diminished attention and concentration     Head: Normocephalic, without obvious abnormality, atraumatic  Eyes: negative findings: lids and lashes normal, conjunctivae and sclerae normal and pupils equal, round, reactive to light and accomodation  Lungs: clear to auscultation bilaterally  Heart: irregularly irregular rhythm  Abdomen: soft, non-tender; bowel sounds normal; no masses,  no organomegaly  Extremities: extremities normal, warm and well-perfused; no cyanosis, clubbing, or edema  Skin: Skin color, texture, turgor normal  No rashes or lesions  Neurologic: Mental status: Awake and alert, diminished attention and concentration  Requires multiple cues to follow commands  Cranial nerves: II: pupils equal, round, reactive to light and accommodation, III,VII: ptosis no ptosis noted, III,IV,VI: extraocular muscles extra-ocular motions intact, V: facial light touch sensation normal bilaterally, VII: upper facial muscle function normal bilaterally, VII: lower facial muscle function normal bilaterally, XII: tongue strength normal  Sensory: normal, Grossly intact to crude touch  Motor: Able to follow commands with at least 4/5 motor strength throughout but limited ability to assess formal motor exam secondary to difficulty following commands       Labs  Recent Results (from the past 24 hour(s))   Fingerstick Glucose (POCT)    Collection Time: 04/30/18  5:33 PM   Result Value Ref Range    POC Glucose 115 65 - 140 mg/dl   ECG 12 lead    Collection Time: 04/30/18  6:02 PM   Result Value Ref Range    Ventricular Rate 95 BPM    Atrial Rate 95 BPM    NV Interval 221 ms    QRSD Interval 146 ms    QT Interval 392 ms    QTC Interval 493 ms    P [place] 59 degrees    QRS Axis -54 degrees    T Wave Axis 107 degrees   ECG 12 lead    Collection Time: 04/30/18  6:05 PM   Result Value Ref Range    Ventricular Rate 96 BPM    Atrial Rate 96 BPM    NV Interval 225 ms    QRSD Interval 142 ms    QT Interval 392 ms    QTC Interval 496 ms    P [place] 67 degrees    QRS Axis -53 degrees    T Wave Axis 99 degrees   Fingerstick Glucose (POCT)    Collection Time: 04/30/18  9:00 PM   Result Value Ref Range    POC Glucose 186 (H) 65 - 140 mg/dl   Hemoglobin    Collection Time: 05/01/18  1:19 AM   Result Value Ref Range    Hemoglobin 8 3 (L) 11 5 - 15 4 g/dL   Magnesium    Collection Time: 05/01/18 5:13 AM   Result Value Ref Range    Magnesium 1 7 1 6 - 2 6 mg/dL   Magnesium    Collection Time: 05/01/18  5:13 AM   Result Value Ref Range    Magnesium 1 6 1 6 - 2 6 mg/dL   Phosphorus    Collection Time: 05/01/18  5:13 AM   Result Value Ref Range    Phosphorus 2 6 2 3 - 4 1 mg/dL   Basic metabolic panel    Collection Time: 05/01/18  5:13 AM   Result Value Ref Range    Sodium 144 136 - 145 mmol/L    Potassium 4 1 3 5 - 5 3 mmol/L    Chloride 111 (H) 100 - 108 mmol/L    CO2 26 21 - 32 mmol/L    Anion Gap 7 4 - 13 mmol/L    BUN 13 5 - 25 mg/dL    Creatinine 0 66 0 60 - 1 30 mg/dL    Glucose 127 65 - 140 mg/dL    Calcium 6 7 (L) 8 3 - 10 1 mg/dL    eGFR 84 ml/min/1 73sq m   CBC    Collection Time: 05/01/18  5:33 AM   Result Value Ref Range    WBC 7 35 4 31 - 10 16 Thousand/uL    RBC 4 08 3 81 - 5 12 Million/uL    Hemoglobin 8 4 (L) 11 5 - 15 4 g/dL    Hematocrit 28 4 (L) 34 8 - 46 1 %    MCV 70 (L) 82 - 98 fL    MCH 20 6 (L) 26 8 - 34 3 pg    MCHC 29 6 (L) 31 4 - 37 4 g/dL    RDW 19 2 (H) 11 6 - 15 1 %    Platelets 711 856 - 503 Thousands/uL    MPV 9 9 8 9 - 12 7 fL   Protime-INR    Collection Time: 05/01/18  5:42 AM   Result Value Ref Range    Protime 19 3 (H) 12 1 - 14 4 seconds    INR 1 61 (H) 0 86 - 1 16   Fingerstick Glucose (POCT)    Collection Time: 05/01/18  6:41 AM   Result Value Ref Range    POC Glucose 144 (H) 65 - 140 mg/dl   Magnesium    Collection Time: 05/01/18 10:26 AM   Result Value Ref Range    Magnesium 1 8 1 6 - 2 6 mg/dL   Iron Saturation %    Collection Time: 05/01/18 10:26 AM   Result Value Ref Range    Iron Saturation 7 %    TIBC 246 (L) 250 - 450 ug/dL    Iron 16 (L) 50 - 170 ug/dL   Ferritin    Collection Time: 05/01/18 10:26 AM   Result Value Ref Range    Ferritin 46 8 - 388 ng/mL   Fingerstick Glucose (POCT)    Collection Time: 05/01/18 10:49 AM   Result Value Ref Range    POC Glucose 278 (H) 65 - 140 mg/dl     Imaging   No new neuro imaging available for review       VTE Prophylaxis: Rivaroxaban    Counseling / Coordination of Care  Total time spent today 20 minutes  Greater than 50% of total time was spent with the patient and / or family counseling and / or coordination of care  A description of the counseling / coordination of care: Reviewed previous notes, imaging

## 2018-05-01 NOTE — CONSULTS
Progress Note - Wound   Adam Woodson [de-identified] y o  female MRN: [id number]  Unit/Bed#: [facility] 733-01 Encounter: [id number]      Assessment:  Wound care consulted for pressure injuries and foot wounds  Patient seen in bed, cooperative with assessment  Patient is assist of 2 with turning, dependent for care  Incontinent of bowel and bladder at times per nursing  Nutrition is following along with patient  Sacrum is intact with blanchable redness, no open areas noted  b/l buttocks are intact with no redness or wounds  No pressure injuries noted  Recommend offloading of pressure and hydraguard cream     B/l feet and toes with multiple areas of dry round shaped intact well adhered eschar  Likely mixed etiology vascular / pressure  No drainage, redness or unroofing noted  Area noted to L medial toe, L foot anterior 2nd toe, L heel, Rt bunoin x 2, right foot red toe base/posterior, and right lateral pink toe  Wounds are approximately 0 8x0 8x0cm and black 100% eschar  Edges are dry attached intact  Recommend to offload and apply betadine daily to keep they clean and dry  Primary nurse aware of plan of care  See flow sheets for more detailed assessment findings  Would care will follow along  Plan:   1  Paint all eschar areas to b/l feet with betadine daily   2  Apply hydraguard to b/l heels, buttocks and sacrum BID and PRN for prevention and protection  3  Apply skin nourishing cream to the entire skin daily   4  Soft care cushion to chair when OOB   5  Elevate heels off of bed with pillows to offload   6  Turn and reposition patient every 2 hours   7  Wound care will follow along with patient weekly, please call with questions and concerns    Objective:    Vitals: Blood pressure 152/77, pulse 98, temperature 98 6 °F (37 °C), temperature source Oral, resp  rate (!) 24, height 5' 4" (1 626 m), weight 75 7 kg (166 lb 14 2 oz), SpO2 95 %, not currently breastfeeding  ,Body mass index is 28 65 kg/m²      Recommendations written as orders    Malissa Gutierrez RN BSN

## 2018-05-01 NOTE — PLAN OF CARE
Problem: SLP ADULT - SWALLOWING, IMPAIRED  Goal: Advance to least restrictive diet without signs or symptoms of aspiration for planned discharge setting  See evaluation for individualized goals  Outcome: Not Progressing  Not progressing as of yet:  Mechanical soft diet c honey thick liquid recommended

## 2018-05-01 NOTE — PROGRESS NOTES
Progress Note - Critical Care   Emerald Bryant [de-identified] y o  female MRN: [id number]  Unit/Bed#: MICU 02 Encounter: [id number]    Attending Physician: Constantine Castro MD      ______________________________________________________________________  Assessment and Plan:   Principal Problem (Resolved):    Encephalopathy acute  Active Problems:    Seizure [facility])    Essential hypertension    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 native heart with angina pectoris ([address] )    Type 2 diabetes mellitus with complication, with long-term current use of insulin (HCC)    Atrial flutter (HCC)    Hyperlipidemia    Gastroesophageal reflux disease without esophagitis    Moderate mitral stenosis    Acute respiratory failure with hypoxia ([address] )    Asthma    Asymptomatic carotid artery stenosis, bilateral    Atherosclerosis of artery of extremity with ulceration (HCC)    Hx of CABG    Chronic anticoagulation    Postoperative hypothyroidism    Paroxysmal atrial fibrillation (HCC)    Hypokalemia    Hypocalcemia    Left bundle branch block (LBBB)    Expressive aphasia    Multiple lacunar infarcts (HCC)    Vomiting    History of CVA (cerebrovascular accident)    Supratherapeutic INR    PVC (premature ventricular contraction)    Pulmonary hypertension (HCC)    Anemia    Severe sepsis (HCC)  Resolved Problems:    Altered mental status    Neuro:   - Acute encephalopathy: consider cEEG monitoring for subclinical seizures, likely related to underlying infectious etiology  - Hx embolic stroke: continue aspirin/statin  - Seizure: continue keppra, appreciate neurology's recommendations     CV:   - Hypertension: goal SBP < 160 mmHg, currently NPO, labetalol PRN  - Afib: xarelto     Pulm:   - Acute hypoxic respiratory failure requiring BiPAP possibly 2/2 aspiration pneumonia: continue as needed for increased work of breathing    GI:   - Continue omeprazole     :   - Strict I/Os  - Trend renal function     F/E/N:   - NPO, repeat speech evaluation when appropriate   - Lactic acidosis: repeat q2h until cleared    ID:   - Severe sepsis likely 2/2 aspiration pneumonia: start vancomycin/cefepime/flagyl, trend fever curve and WBC, f/u blood cultures    Heme:   - Trend hgb    Endo:   - DM II with hyperglycemia: change to q6h accuchecks, increase algorithm  - Continue home synthroid      Msk/Skin:   - q2h repositioning     Disposition:   - Admit to MICU     Code Status: Level 1 - Full Code    Counseling / Coordination of Care  Total Critical Care time spent 38 minutes excluding procedures, teaching and family updates  ______________________________________________________________________    HPI: [de-identified] y/o female admitted on 4/28 with altered mental status and weakness  Subsequently developed tonic clonic seizure activity requiring intubation and cEEG monitoring  This did not reveal any seizures  Patient subsequently extubated and transferred back to the floor  This afternoon patient developed acute onset of shortness of breath and hypertension with SBP 200s  CXR revealed worsening pulmonary edema  Patient placed on BiPAP and received lasix 40 mg IV and labetalol 10 mg IV with improvement of blood pressure  Labs were sent at that time which revealed new leukocytosis and patient found to be febrile to 101 3F  Blood cultures sent and patient transferred to MICU  Review of Systems - patient unable to participate given increased WOB  ______________________________________________________________________    Physical Exam:   Physical Exam   Constitutional: She appears distressed (mildly)  HENT:   Head: Normocephalic and atraumatic  Eyes: Pupils are equal, round, and reactive to light  Neck: Neck supple  No tracheal deviation present  Cardiovascular: Regular rhythm  Tachycardia present  Pulmonary/Chest: Tachypnea noted  She is in respiratory distress (mild)  She has no wheezes  She has rales (bases bilaterally)  Abdominal: Soft   Bowel sounds are normal  She exhibits no distension  Neurological:   Moves all four extremities  Grossly nonfocal neuro exam  Lethargic but follows commands  Disoriented    Skin: She is diaphoretic    ______________________________________________________________________  Vitals:    18 1622 18 1639 18 1644 18 1735   BP:  149/82     BP Location:  Right arm     Pulse:  98     Resp:  (!) 31     Temp:       TempSrc:       SpO2: 96% 97% 97% 96%   Weight:       Height:           Temperature:   Temp (24hrs), Av 8 °F (37 1 °C), Min:98 3 °F (36 8 °C), Max:99 3 °F (37 4 °C)    Current Temperature: 99 1 °F (37 3 °C)  Weights:   IBW: 54 7 kg    Body mass index is 28 65 kg/m²  Weight (last 2 days)     Date/Time   Weight    18 0600  75 7 (166 89)    18 1855  75 3 (166 01)            Hemodynamic Monitoring:  N/A     BiPAP Settings  Non-Invasive Ventilation Mode: BiPAP  IPAP (cm): 10 cm  EPAP (cm): 5 cm     Rate (Set): 8  Leak (lpm): 19  Total Rate: 32  Spontaneous Vt (mL): 550    Intake and Outputs:  I/O       701 -  0700  07 -  0700  07 -  0700    P  O   30 780    I V  (mL/kg) 2045 2 (27 8) 32 (0 4)     NG/      IV Piggyback 646 7 300     Total Intake(mL/kg) 3289 9 (44 8) 362 (4 8) 780 (10 3)    Urine (mL/kg/hr) 875 (0 5) 585 (0 3) 700 (0 9)    Stool       Total Output 875 585 700    Net +2414 9 -223 +80               Nutrition:        Diet Orders            Start     Ordered    18 1707  Diet NPO  Diet effective now     Question Answer Comment   Diet Type NPO    RD to adjust diet per protocol?  No        18 1712        Labs:     Results from last 7 days  Lab Units 18  1621 18  0533 18  0119 18  1156 18  0540  18  2140  18  0913   WBC Thousand/uL 14 78* 7 35  --   --  8 24  8 24  < > 9 60  --  9 30   HEMOGLOBIN g/dL 9 0* 8 4* 8 3* 8 1* 7 5*  7 5*  < > 9 2*  --  9 5*   I STAT HEMOGLOBIN   --   --   --   -- --   < >  --   < >  --    HEMATOCRIT % 29 9* 28 4*  --  26 1* 24 7*  24 7*  < > 30 1*  --  30 7*   PLATELETS Thousands/uL 318 239  --   --  210  210  < > 309  --  315   NEUTROS PCT %  --   --   --   --  77*  --  74  --  71   MONOS PCT %  --   --   --   --  7  --  6  --  6   < > = values in this interval not displayed  Results from last 7 days  Lab Units 05/01/18  1621 05/01/18  0513 04/30/18  0540  04/29/18  1029  04/29/18  0450 04/28/18  2140   SODIUM mmol/L 140 144 142  < > 141  --  139 140   POTASSIUM mmol/L 4 3 4 1 3 2*  < > 3 7  --  3 5 3 6   CHLORIDE mmol/L 108 111* 109*  < > 106  --  108 107   CO2 mmol/L 22 26 24  < > 11*  --  15* 21   BUN mg/dL 11 13 17  < > 14  --  12 11   CREATININE mg/dL 0 86 0 66 0 98  < > 1 13  --  0 82 0 72   CALCIUM mg/dL 7 2* 6 7* 6 0*  < > 6 3*  --  5 9* 6 0*   TOTAL PROTEIN g/dL  --   --   --   --  7 5  --  7 3 7 4   BILIRUBIN TOTAL mg/dL  --   --   --   --  0 83  --  0 81 0 83   ALK PHOS U/L  --   --   --   --  73  --  70 72   ALT U/L  --   --   --   --  21  --  19 19   AST U/L  --   --   --   --  43  --  30 28   GLUCOSE RANDOM mg/dL 339* 127 153*  < > 204*  --  132 122   GLUCOSE, ISTAT   --   --   --   --   --   < >  --   --    < > = values in this interval not displayed      Results from last 7 days  Lab Units 05/01/18  1621 05/01/18  1026 05/01/18  0513   MAGNESIUM mg/dL 2 2 1 8 1 7  1 6     Lab Results   Component Value Date    PHOS 2 9 05/01/2018    PHOS 2 6 05/01/2018    PHOS 3 1 04/30/2018        Results from last 7 days  Lab Units 05/01/18  0542 04/30/18  0540 04/29/18  1538 04/29/18  1028   INR  1 61* 3 23* 6 94* 10 50*   PTT seconds  --  47* 53* 62*       0  Lab Value Date/Time   TROPONINI 0 03 05/01/2018 1621   TROPONINI 0 03 04/28/2018 0913   TROPONINI 0 06 (H) 04/16/2018 1326   TROPONINI 2 02 (H) 03/16/2018 0508   TROPONINI 2 31 (H) 03/16/2018 0208   TROPONINI 2 20 (H) 03/15/2018 2302   TROPONINI 1 98 (H) 03/15/2018 1938   TROPONINI 1 61 (H) 03/15/2018 1629 TROPONINI 0 56 (H) 03/15/2018 1331   TROPONINI 2 38 (H) 2017 0806   TROPONINI 2 85 (H) 2017 0537   TROPONINI 2 88 (H) 2017 0537   TROPONINI 1 34 (H) 2017 2141   TROPONINI 0 43 (H) 2017 1655   TROPONINI 0 02 2017 1424       Results from last 7 days  Lab Units 18  1538 18  1417 18  0926   LACTIC ACID mmol/L 1 0 1 2 1 8     ABG:  Lab Results   Component Value Date    PHART 7 295 (L) 2018    PUW7DCI 40 5 2018    PO2ART 74 0 (L) 2018    OFS1MLI 19 3 (L) 2018    BEART -6 8 2018    SOURCE Radial, Right 2018     Imagin/1 CXR: Extensive bilateral hazy airspace disease throughout both lungs with a small left basilar effusion may indicate CHF  Multilobar pneumonia is less likely part of the differential diagnosis   Echo: EF 35%, moderate diffuse hypokinesis with distinct regional wall motion abnormalities  More severe hypokinesis to akinesis was seen in the inferoseptal and inferior walls  Moderate MS, mild MR, moderate TR, estimate peak PA pressure 70 mmHg  I have personally reviewed pertinent reports  and I have personally reviewed pertinent films in PACS  EKG: Sinus tachycardia  Micro:  No results found for: Oniel Leodaner, SPUTUMCULTUR  Allergies:    Allergies   Allergen Reactions    Other Chest Pain     IVP-listed as "chest pain" in previous chart, patient stated this occurred "a long time ago" but does not remember exactly occurred     Cephalosporins Chest Pain    Contrast [Iodinated Diagnostic Agents] Other (See Comments)     Flash pulm edema    Doxycycline Chest Pain    Levaquin [Levofloxacin] Chest Pain    Ondansetron Chest Pain     Prolonged QT    Toradol [Ketorolac Tromethamine] Chest Pain     Medications:   Scheduled Meds:  Current Facility-Administered Medications:  acetaminophen 650 mg Oral Q6H PRN Kellie Tyler DO    albuterol 2 5 mg Nebulization Q4H PRN Florentino Lara MD aspirin 81 mg Oral Daily Lizbeth Starch, DO    atorvastatin 80 mg Oral Daily With Dinner Srini De Souza MD    cefepime 2,000 mg Intravenous Q12H Tomas Melo, DO    fluticasone 1 puff Inhalation Q12H [address] Vilma Bolaños MD    influenza vaccine 0 5 mL Intramuscular Prior to discharge Baron Hernán MD    insulin lispro 1-5 Units Subcutaneous HS Vilma Bolaños MD    insulin lispro 1-5 Units Subcutaneous TID With Meals Srini De Souza MD    labetalol 10 mg Intravenous Q6H PRN Alesia Shadow, DO    levETIRAcetam 750 mg Intravenous Q12H [address] Ina Snow MD Last Rate: 750 mg (05/01/18 0900)   levothyroxine 100 mcg Oral Daily Vilma Bolaños MD    loratadine 10 mg Oral Daily Vilma Bolaños MD    LORazepam 2 mg Intravenous PRN Domnick Sorrel Bashor, DO    metoclopramide 10 mg Intravenous Q6H PRN Srini De Souza MD    metoprolol tartrate 75 mg Oral Q12H [address] Lizbeth Starch, DO    metroNIDAZOLE 500 mg Intravenous Q8H Lizbeth Starch, DO    nystatin  Topical BID Domnick Sorrel Bashor, DO    omeprazole (PRILOSEC) suspension 2 mg/mL 20 mg Oral Daily Flaco S Bashor, DO    polyethylene glycol 17 g Oral Daily PRN Ina Snow MD    rivaroxaban 20 mg Oral Daily With [employer], DO    senna-docusate sodium 1 tablet Oral HS Ashlie James PA-C    vancomycin 15 mg/kg Intravenous Q12H Lizbeth Starch, DO      Continuous Infusions:   PRN Meds:    acetaminophen 650 mg Q6H PRN   albuterol 2 5 mg Q4H PRN   influenza vaccine 0 5 mL Prior to discharge   labetalol 10 mg Q6H PRN   LORazepam 2 mg PRN   metoclopramide 10 mg Q6H PRN   polyethylene glycol 17 g Daily PRN     VTE Pharmacologic Prophylaxis: Rivaroxaban (Xarelto)  VTE Mechanical Prophylaxis: sequential compression device  Invasive lines and devices:   Invasive Devices     Peripheral Intravenous Line            Peripheral IV 04/29/18 Right Arm 2 days    Peripheral IV 05/01/18 Left Antecubital less than 1 day          Drain            NG/OG/Enteral Tube 2 days                     Portions of the record may have been created with voice recognition software  Occasional wrong word or "sound a like" substitutions may have occurred due to the inherent limitations of voice recognition software  Read the chart carefully and recognize, using context, where substitutions have occurred      Zina MAGDALENO Plummer

## 2018-05-01 NOTE — PROGRESS NOTES
IM Residency Progress Note   Unit/Bed#: [facility] 733-01 Encounter: [id number]  SOD Team C       Angélica Grover [de-identified] y o  female [id number]    Assessment/Plan:    Active Problems:    Essential hypertension    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 native heart with angina pectoris (HCC)    Type 2 diabetes mellitus with complication, with long-term current use of insulin (HCC)    Atrial flutter (HCC)    Hyperlipidemia    Gastroesophageal reflux disease without esophagitis    Moderate mitral stenosis    Asthma    Asymptomatic carotid artery stenosis, bilateral    Atherosclerosis of artery of extremity with ulceration (HCC)    Hx of CABG    Chronic anticoagulation    Postoperative hypothyroidism    Paroxysmal atrial fibrillation (HCC)    Hypokalemia    Hypocalcemia    Left bundle branch block (LBBB)    Expressive aphasia    Multiple lacunar infarcts (HCC)    Vomiting    History of CVA (cerebrovascular accident)    Seizure ([address] )    Supratherapeutic INR    PVC (premature ventricular contraction)    Pulmonary hypertension (HCC)    Anemia    New Onset Seizure:    -Patient had EMU which did not show any electrographic evidence of seizure  -MRI pending   -Seizure precautions   -continue Keppra, follow up Neurology recommendations  Hx of cardioembolic stroke:    -previously failed Coumadin and transitioned to Xarelto  -Will resume Xarelto as INR is <3     -Telemetry reviewed and showed VTach    Vtach: Asymptomatic, 25 beats approx     -Electrolytes repleted  Mg was 1 6 at time of event     -given 2 g of magnesium   -will recheck magnesium   -maintain potassium greater than 4  Atrial fibrillation/flutter:  She is on 100 mg of Lopressor b i d  as an outpatient, however currently she is on 50 mg b i d  Yennifer Esteban I will increase her dose to 75 mg b i d  due to her elevated heart rate   -resume Xarelto    CAD: continue Lipitor, resume ASA, Lopressor     HTN: bp stable and at goal      DM2: BG at goal     -continue sliding scale insulin  COPD: No acute exacerbation     -continue Flovent and albuterol  Hypothyroidism:  Continue Synthroid      Disposition: Continue inpatient care, F/u Echo      Subjective:   Patient is a poor historian  She is alert to person only  Answers simple yes/no questions  Denies pain  Events overnight include asymptomatic V-tach of approx 25 beats  Her Mg was repleted and was given Calcium Gluconate as well  On tele currently in A  Fib rate 100's-110  Vitals: Temp (24hrs), Av 6 °F (37 °C), Min:98 2 °F (36 8 °C), Max:99 3 °F (37 4 °C)  Current: Temperature: 98 6 °F (37 °C)  Vitals:    18 2313 18 0446 18 0600 18 0739   BP: 150/79 165/92  162/85   BP Location: Right arm Right arm  Right arm   Pulse: 92 95  90   Resp: 22 (!) 30  (!) 26   Temp: 98 3 °F (36 8 °C) 98 6 °F (37 °C)  98 6 °F (37 °C)   TempSrc: Oral Oral  Oral   SpO2: 94% 95%  95%   Weight:   75 7 kg (166 lb 14 2 oz)    Height:        Body mass index is 28 65 kg/m²  I/O last 24 hours:   In: 46 [P O :30; I V :32; IV Piggyback:300]  Out: 585 [Urine:585]      Physical Exam: General appearance: alert, appears stated age, cooperative and no distress  Head: Normocephalic, without obvious abnormality, atraumatic  Eyes: EOMI, no icterus  Lungs: clear to auscultation bilaterally, no rales/rhonchi/wheezes  Heart:   Irregularly irregularS1, S2 normal, Grade 2/6 murmur in mitral area, click, rub or gallop  Abdomen: soft, non-tender; bowel sounds normal  Extremities: extremities normal, atraumatic, no cyanosis or edema  Neurologic: CN 2-12 intact, 5/5 UE/LE muscle strength, speech fluent    Invasive Devices     Peripheral Intravenous Line            Peripheral IV 18 Right Arm 2 days    Peripheral IV 18 Left Antecubital less than 1 day          Drain            NG/OG/Enteral Tube 1 day                          Labs:   Recent Results (from the past 24 hour(s))   Fingerstick Glucose (POCT)    Collection Time: 04/30/18 10:07 AM   Result Value Ref Range    POC Glucose 185 (H) 65 - 140 mg/dl   Fingerstick Glucose (POCT)    Collection Time: 04/30/18 11:33 AM   Result Value Ref Range    POC Glucose 143 (H) 65 - 140 mg/dl   Hemoglobin and hematocrit, blood    Collection Time: 04/30/18 11:56 AM   Result Value Ref Range    Hemoglobin 8 1 (L) 11 5 - 15 4 g/dL    Hematocrit 26 1 (L) 34 8 - 46 1 %   Hemolysis Smear    Collection Time: 04/30/18 11:56 AM   Result Value Ref Range    Hemolysis Smear No Schistocytes or Helmet Cells noted    Fingerstick Glucose (POCT)    Collection Time: 04/30/18  5:33 PM   Result Value Ref Range    POC Glucose 115 65 - 140 mg/dl   Fingerstick Glucose (POCT)    Collection Time: 04/30/18  9:00 PM   Result Value Ref Range    POC Glucose 186 (H) 65 - 140 mg/dl   Hemoglobin    Collection Time: 05/01/18  1:19 AM   Result Value Ref Range    Hemoglobin 8 3 (L) 11 5 - 15 4 g/dL   Magnesium    Collection Time: 05/01/18  5:13 AM   Result Value Ref Range    Magnesium 1 7 1 6 - 2 6 mg/dL   Magnesium    Collection Time: 05/01/18  5:13 AM   Result Value Ref Range    Magnesium 1 6 1 6 - 2 6 mg/dL   Phosphorus    Collection Time: 05/01/18  5:13 AM   Result Value Ref Range    Phosphorus 2 6 2 3 - 4 1 mg/dL   Basic metabolic panel    Collection Time: 05/01/18  5:13 AM   Result Value Ref Range    Sodium 144 136 - 145 mmol/L    Potassium 4 1 3 5 - 5 3 mmol/L    Chloride 111 (H) 100 - 108 mmol/L    CO2 26 21 - 32 mmol/L    Anion Gap 7 4 - 13 mmol/L    BUN 13 5 - 25 mg/dL    Creatinine 0 66 0 60 - 1 30 mg/dL    Glucose 127 65 - 140 mg/dL    Calcium 6 7 (L) 8 3 - 10 1 mg/dL    eGFR 84 ml/min/1 73sq m   CBC    Collection Time: 05/01/18  5:33 AM   Result Value Ref Range    WBC 7 35 4 31 - 10 16 Thousand/uL    RBC 4 08 3 81 - 5 12 Million/uL    Hemoglobin 8 4 (L) 11 5 - 15 4 g/dL    Hematocrit 28 4 (L) 34 8 - 46 1 %    MCV 70 (L) 82 - 98 fL    MCH 20 6 (L) 26 8 - 34 3 pg    MCHC 29 6 (L) 31 4 - 37 4 g/dL    RDW 19 2 (H) 11 6 - 15 1 %    Platelets 512 684 - 196 Thousands/uL    MPV 9 9 8 9 - 12 7 fL   Protime-INR    Collection Time: 05/01/18  5:42 AM   Result Value Ref Range    Protime 19 3 (H) 12 1 - 14 4 seconds    INR 1 61 (H) 0 86 - 1 16   Fingerstick Glucose (POCT)    Collection Time: 05/01/18  6:41 AM   Result Value Ref Range    POC Glucose 144 (H) 65 - 140 mg/dl       Radiology Results: I have personally reviewed pertinent reports  Other Diagnostic Testing:   I have personally reviewed pertinent reports          Active Meds:   Current Facility-Administered Medications   Medication Dose Route Frequency    acetaminophen (TYLENOL) tablet 650 mg  650 mg Oral Q6H PRN    albuterol (2 5 mg/3 mL) 0 083 % inhalation solution **AcuDose Override Pull**        albuterol inhalation solution 2 5 mg  2 5 mg Nebulization Q4H PRN    atorvastatin (LIPITOR) tablet 80 mg  80 mg Oral Daily With Dinner    fluticasone (FLOVENT HFA) 110 MCG/ACT inhaler 1 puff  1 puff Inhalation Q12H [address]    insulin lispro (HumaLOG) 100 units/mL subcutaneous injection 1-5 Units  1-5 Units Subcutaneous HS    insulin lispro (HumaLOG) 100 units/mL subcutaneous injection 1-5 Units  1-5 Units Subcutaneous TID With Meals    labetalol (NORMODYNE) injection 10 mg  10 mg Intravenous Q6H PRN    levETIRAcetam (KEPPRA) 750 mg in sodium chloride 0 9 % 100 mL IVPB  750 mg Intravenous Q12H [address]    levothyroxine tablet 100 mcg  100 mcg Oral Daily    loratadine (CLARITIN) tablet 10 mg  10 mg Oral Daily    LORazepam (ATIVAN) 2 mg/mL injection 2 mg  2 mg Intravenous PRN    metoclopramide (REGLAN) injection 10 mg  10 mg Intravenous Q6H PRN    metoprolol tartrate (LOPRESSOR) tablet 50 mg  50 mg Oral Q12H LANCE    nystatin (MYCOSTATIN) powder   Topical BID    omeprazole (PRILOSEC) suspension 2 mg/mL  20 mg Oral Daily    polyethylene glycol (MIRALAX) packet 17 g  17 g Oral Daily PRN    senna-docusate sodium (SENOKOT S) 8 6-50 mg per tablet 1 tablet  1 tablet Oral HS         VTE Pharmacologic Prophylaxis:  Xarelto  VTE Mechanical Prophylaxis: sequential compression device    Elizabeth Macdonald DO

## 2018-05-01 NOTE — OCCUPATIONAL THERAPY NOTE
[address] Evaluation     Patient Name: Hali Garcia Date: 5/1/2018  Problem List  Patient Active Problem List   Diagnosis    Essential hypertension    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 native heart with angina pectoris ([facility] )    Type 2 diabetes mellitus with complication, with long-term current use of insulin (HCC)    Atrial flutter (HCC)    Hyperlipidemia    Gastroesophageal reflux disease without esophagitis    Moderate mitral stenosis    Arthritis of hand    Asthma    Asymptomatic carotid artery stenosis, bilateral    Atherosclerosis of artery of extremity with ulceration ([facility] )    Hx of CABG    Chronic anticoagulation    Chronic diastolic congestive heart failure (HCC)    Degenerative joint disease involving multiple joints    Diabetic retinopathy (HCC)    Postoperative hypothyroidism    Migraine headache    Nephrolithiasis    Osteoarthritis of both knees    Paroxysmal atrial fibrillation (HCC)    Ulcer of toe of left foot (HCC)    Ulcer of toe of right foot (HCC)    Prolonged Q-T interval on ECG    Hypokalemia    Hypocalcemia    Left bundle branch block (LBBB)    1st degree AV block    S/P TAVR (transcatheter aortic valve replacement)    Expressive aphasia    Multiple lacunar infarcts (HCC)    Vomiting    History of CVA (cerebrovascular accident)    [facility]    Supratherapeutic INR    PVC (premature ventricular contraction)    Pulmonary hypertension (HCC)    Anemia     Past Medical History  Past Medical History:   Diagnosis Date    Altered mental status     Anticoagulated     Coumadin for Aflutter    Asthma     Atrial flutter (HCC)     maintained on coumadin anticoag    CAD (coronary artery disease)     Candidiasis     Carotid stenosis, bilateral     Cataract     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) (HCC)     Chronic fatigue syndrome     Chronic low back pain     Concussion w loss of consciousness of unsp duration, init     Coronary artery disease     CVA (cerebral vascular accident) ([facility] ) 4/17/2018    Diabetes mellitus ([facility] )     type 2, insulin dependent    DJD (degenerative joint disease)     GERD (gastroesophageal reflux disease)     Herpes zoster     HLD (hyperlipidemia)     HTN (hypertension)     Hyperlipidemia     Hypothyroidism     Irritable bowel syndrome     Lumbar radiculopathy     Lyme disease     Dx in hospital 8/2011    Lyme disease     MI (myocardial infarction) ([facility] )     Migraines     Muscle spasm     Non-neoplastic nevus     Nontoxic multinodular goiter     OA (osteoarthritis)     Osteoarthritis     Other chronic pain     PAD (peripheral artery disease) (HCC)     Palpitations     Pseudogout     Spinal stenosis     Transient cerebral ischemia     Trigger ring finger     Viral gastroenteritis      Past Surgical History  Past Surgical History:   Procedure Laterality Date    APPENDECTOMY      ARTERIOGRAM  12/19/2017    CARDIAC CATHETERIZATION      CHOLECYSTECTOMY      COLONOSCOPY      CORONARY ARTERY BYPASS GRAFT  2004    LUMBAR LAMINECTOMY      MD ECHO TRANSESOPHAG R-T 2D W/PRB IMG ACQUISJ I&R N/A 4/3/2018    Procedure: TRANSESOPHAGEAL ECHOCARDIOGRAM (ROBB); Surgeon: Mauro Haley DO;  [facility];  Service: Cardiac Surgery    MD REPLACE AORTIC VALVE OPENFEMORAL ARTERY APPROACH N/A 4/3/2018    Procedure: REPLACEMENT AORTIC VALVE TRANSCATHETER (TAVR) TRANSFEMORAL w/ 26MM IVY YEVGENIY S3 VALVE;  Surgeon: Mauro Haley DO;  [facility];  Service: Cardiac Surgery    THYROIDECTOMY      TOTAL ABDOMINAL HYSTERECTOMY W/ BILATERAL SALPINGOOPHORECTOMY           05/01/18 1138   Note Type   Note type Eval/Treat   Restrictions/Precautions   Weight Bearing Precautions Per Order No   Other Precautions Cognitive; Chair Alarm; Bed Alarm;Multiple lines;Telemetry;O2; Restraints; Fall Risk;Seizure  (Chair alarm on and restraints applied at end of session )   Pain Assessment   Pain Assessment No/denies pain   Pain Score No Pain   Home Living   Type of [address] Two level   Home Equipment Walker   Additional Comments Pt reported living home alone, however when questioned, since her stroke, Pt lives with her son and LESLI in a bilevel house w/ + JUSTINO  Prior Function   Level of [place] Independent with ADLs and functional mobility   Lives With Family   Receives Help From Family   ADL Assistance Independent   Falls in the last 6 months 0   Comments Pt was I w/  ADLS & required use of rw PTA  Lifestyle   Autonomy Pt was I w/ ADLS and utilized rw PTA  Reciprocal Relationships Pt lives with her son and LESLI however both work per [employer] note   Psychosocial   Psychosocial (WDL) WDL   ADL   Eating Assistance 5  Supervision/Setup   Grooming Assistance 4  Minimal Assistance   [facility] 4  [facility] 4  [address] [employer] [facility] 4  Minimal Assistance   Bed Mobility   Supine to Sit 3  Moderate assistance   Additional items Assist x 1; Increased time required;Verbal cues   Sit to Supine Unable to assess  (Pt OOB to chair at end of therapy session )   Transfers   Sit to Stand 3  Moderate assistance   Additional items Assist x 2; Increased time required;Verbal cues   Stand to Sit 3  Moderate assistance   Additional items Assist x 2; Increased time required;Verbal cues   Additional Comments Pt requires mod A x1 and min A/ SBA of 2nd for safety    Functional Mobility   Functional Mobility 3  Moderate assistance   Additional Comments Pt requires mod A x2 for steadying/balance   Pt presents w/ significantly impaired gait w/ noted stepping L foot across from right presenting as fall risk    Additional items Rolling walker   Balance Static Sitting Fair +   Dynamic Sitting Fair -   Static Standing Poor +   Dynamic Standing Poor   Ambulatory Poor   Activity Tolerance   Activity Tolerance Patient tolerated treatment well;Patient limited by fatigue   Medical Staff Made Aware PT [address]     Nurse Made Aware RN confirm pt appropriate for OT session    RUE Assessment   RUE Assessment WFL   LUE Assessment   LUE Assessment WFL   Hand Function   Gross Motor Coordination Functional   Fine Motor Coordination Functional   Cognition   Overall Cognitive Status Impaired   Arousal/Participation Alert; Responsive; Cooperative   Attention Attends with cues to redirect   Orientation Level Oriented to person;Disoriented to time;Disoriented to place; Disoriented to situation   Memory Decreased short term memory;Decreased recall of recent events   Following Commands Follows one step commands with increased time or repetition   Comments Pt is alert and oriented to self  Pt reports date as mid April 2014  Pt reports she is currently "in a home " Pt presents w/ decreased processing and requires increased time and repetition to follow commands  Pt w/ difficulty sequencing mobility w/ use of rw  Recommend formal cognitive evaluation to assist w/ safe D/C planning    Assessment   Limitation Decreased ADL status; Decreased Safe judgement during ADL;Decreased cognition;Decreased endurance;Decreased self-care trans;Decreased high-level ADLs   Prognosis Fair   Assessment Pt is an [de-identified] y/o female seen for OT eval s/p adm to [facility] w/ AMS, weakness, vomitting and loose stools  Pt w/ witnessed tonic-clonic seizure during morning rounds and required intubation and had postictal confusion  Pt is s/p extubation  Pt dx'd w/ acute encephalopathy, acute hypoxic respiratory failure, and vtach  Comorbidities include a h/o CVA, DM, COPD, hypothyroidism, PAD, a-fib, CAD s/p CABG, aortic stenosis s/p TAVR, atrial flutter, cataract, CHF, HTN, HLD, DJD, MI, and spinal stenosis   Pt with active OT orders and up with assistance orders  Pt lives with her son and DIL in a bilevel house w/ + JUSTINO  Pt was I w/  ADLS & required use of rw PTA  Pt is currently demonstrating the following occupational deficits: min A ADLS, mod A x2 functional transfers and mod A x2 functional mobility using rw for short distances  Pt with deficits and limitations in all baseline areas of occupation 2* SOB, significant fatigue, decreased cognition, decreased orientation, decreased processing, decreased safety awareness, decreased endurance/activity tolerance, decreased functional forward reach, decreased ADL status, impaired balance, decreased mobility status, impaired coordination, deconditioning and generalized weakness  The following Occupational Performance Areas to address include: grooming, bathing/shower, toilet hygiene, dressing, medication management, health maintenance, functional mobility, community mobility, clothing management, meal prep and household maintenance  Pt scored overall 40/100 on the Barthel Index  Based on the aforementioned OT evaluation, functional performance deficits, and assessments, pt has been identified as a high complexity evaluation  Recommend STR upon D/C  Pt to continue to benefit from acute immediate OT services to address the following goals 3-5x/wk to  w/in 10-14 days:   Goals   Patient Goals none expressed   Plan   Treatment Interventions ADL retraining;Functional transfer training; Endurance training;Cognitive reorientation;Patient/family training;Equipment evaluation/education; Compensatory technique education;Continued evaluation; Energy conservation; Activityengagement   Goal Expiration Date 05/15/18   OT Frequency 3-5x/wk   Recommendation   OT Discharge Recommendation Short Term Rehab   OT - OK to Discharge Yes  (to STR when medically stable)   Barthel Index   Feeding 5   Bathing 0   Grooming Score 0   Dressing Score 5   Bladder Score 10   Bowels Score 10   Toilet Use Score 5   Transfers (Bed/Chair) Score 5   Mobility (Level Surface) Score 0   Stairs Score 0   Barthel Index Score 40   Modified Mesa Scale   Modified Mesa Scale 4       GOALS:    1) Pt will improve activity tolerance to G for min 30 min txment sessions  2) Pt will complete ADLs/self care w/ SBA w/ G hyiene/thoroughness w/ min cues fro cog support  3) Pt will complete toileting w/ supervision w/ G hygiene/thoroughness using DME as needed  4) Pt will improve functional transfers on/off all surfaces using DME as needed w/ G balance/safety including toileting w/ min A  5) Pt will improve functional mobility during ADL/IADL/leisure tasks using DME as needed w/ g balance/safety w/ min A  6) Pt will engage in ongoing cognitive assessment w/ G participation w/ supervision to assist w/ safe d/c planning/recommendations  7) Pt will demonstrate G carryover of pt/caregiver education and training as appropriate w/ supervision w/o cues w/ G tolerance  8) Pt will demonstrate 100% carryover of learned E C  techniques s/p skilled education w/o cues t/o functional ADL/ IADL/leisure interest tasks w/ supervision   9) Pt will follow 100% simple one step verbal commands and be A/Ox4 consistently t/o use of external environmental cues w/ mod ROBERT Villanueva MS, OTR/L

## 2018-05-01 NOTE — PLAN OF CARE
Problem: SLP ADULT - SWALLOWING, IMPAIRED  Goal: Initial SLP swallow eval performed  Outcome: Completed Date Met: 04/30/18

## 2018-05-01 NOTE — RESPIRATORY THERAPY NOTE
RT Protocol Note  Fran Llamas [de-identified] y o  female MRN: [id number]  Unit/Bed#: [facility] 733-01 Encounter: [id number]    Assessment    Active Problems:    Essential hypertension    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 native heart with angina pectoris (HCC)    Type 2 diabetes mellitus with complication, with long-term current use of insulin (HCC)    Atrial flutter (HCC)    Hyperlipidemia    Gastroesophageal reflux disease without esophagitis    Moderate mitral stenosis    Asthma    Asymptomatic carotid artery stenosis, bilateral    Atherosclerosis of artery of extremity with ulceration (HCC)    Hx of CABG    Chronic anticoagulation    Postoperative hypothyroidism    Paroxysmal atrial fibrillation (HCC)    Hypokalemia    Hypocalcemia    Left bundle branch block (LBBB)    Expressive aphasia    Multiple lacunar infarcts ([facility])    Vomiting    History of CVA (cerebrovascular accident)    Seizure ([facility] )    Supratherapeutic INR    PVC (premature ventricular contraction)    Pulmonary hypertension ([facility])    Anemia      Home Pulmonary Medications:  mdi albuterol prn       Past Medical History:   Diagnosis Date    Altered mental status     Anticoagulated     Coumadin for Aflutter    Asthma     Atrial flutter ([facility] )     maintained on coumadin anticoag    CAD (coronary artery disease)     Candidiasis     Carotid stenosis, bilateral     Cataract     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) ([facility])     Chronic fatigue syndrome     Chronic low back pain     Concussion w loss of consciousness of unsp duration, init     Coronary artery disease     CVA (cerebral vascular accident) ([facility] ) 4/17/2018    Diabetes mellitus ([facility] )     type 2, insulin dependent    DJD (degenerative joint disease)     GERD (gastroesophageal reflux disease)     Herpes zoster     HLD (hyperlipidemia)     HTN (hypertension)     Hyperlipidemia     Hypothyroidism     Irritable bowel syndrome     Lumbar radiculopathy     Lyme disease     Dx in hospital 8/2011    Lyme disease     MI (myocardial infarction) ([facility] [address] )     Migraines     Muscle spasm     Non-neoplastic nevus     Nontoxic multinodular goiter     OA (osteoarthritis)     Osteoarthritis     Other chronic pain     PAD (peripheral artery disease) (HCC)     Palpitations     Pseudogout     Spinal stenosis     Transient cerebral ischemia     Trigger ring finger     Viral gastroenteritis      Social History     Social History    Marital status:      Spouse name: N/A    Number of children: N/A    Years of education: N/A     Social History Main Topics    Smoking status: Never Smoker    Smokeless tobacco: Never Used    Alcohol use No    Drug use: No    Sexual activity: No     Other Topics Concern    None     Social History Narrative    None       Subjective         Objective    Physical Exam:   Assessment Type: (P) Pre-treatment  General Appearance: (P) Awake  Respiratory Pattern: (P) Assisted  Chest Assessment: (P) Chest expansion symmetrical  Bilateral Breath Sounds: (P) Diminished, Clear    Vitals:  Blood pressure 150/79, pulse 92, temperature 98 3 °F (36 8 °C), temperature source Oral, resp  rate 22, height 5' 4" (1 626 m), weight 75 3 kg (166 lb 0 1 oz), SpO2 95 %, not currently breastfeeding  Results from last 7 days  Lab Units 04/29/18  1416   PH ART  7 452*   PCO2 ART mm Hg 30 4*   PO2 ART mm Hg 128 6   HCO3 ART mmol/L 20 7*   BASE EXC ART mmol/L -2 6   O2 CONTENT ART mL/dL 12 4*   O2 HGB, ARTERIAL % 97 7*   ABG SOURCE  Line, Arterial   ZAY TEST  Yes       Imaging and other studies: I have personally reviewed pertinent reports        O2 Device: ventilator     Plan    Respiratory Plan: Mild Distress pathway        Resp Comments: (P) [de-identified] yr old female admitted with vomiting found resting on 2 ilters with mild sob, chetna bs diminshed clear, npc, x-ray shows Small left pleural effusion and atelectasis or consolidation in the base of the left lower lobe  ,  Pt has hx of asthma and uses prn mdi at home, pt confused at this time and will be started on uds prn for sob and wheezing

## 2018-05-01 NOTE — RESTORATIVE TECHNICIAN NOTE
Restorative Specialist Mobility Note             Repositioned: Other (Comment) (Rep /sat pt upright in bed  Bed alarm on  Pt callbell, phone/tray within reach   Soft waist posie on )       Ammy PERALTA, Restorative Technician, [employer]

## 2018-05-01 NOTE — PROGRESS NOTES
Patient was tachypneic on appearance and exertion, unable to follow directions for rescue inhaler  When in room patient alarmed and had 25 beat run of vtach vs svt on monitor  Notified SOD  Patient denies CP, SOB, or lightheaded ness, denies feeling her heart race  Stat labs were drawn and no iv inserted

## 2018-05-01 NOTE — PROGRESS NOTES
Called to patients room at approx 3:50 PM as patient was having increased WOB and desat to 86%  Came to eval patient promprtly  Patient noted to be in respiratory distress with RR of approx 30 and placed on 6LNC and SpO2 94%  A chest x-ray was obtained which initially showed vascular congestion concerning for pulmonary edema, patient was also complaining of shortness of breath, denied any chest pain at that time, she had no other complaints  EKG shows tachycardia, with a history of known left bundle branch block  Initial vital signs did show blood pressure of 070 systolic over 97 diastolic    Initially she was given 40 mg of Lasix and labetalol  Blood pressure and tachycardia improved  Subsequently it was noted the patient had a temperature of 101 3°, there was concern for aspiration pneumonia  Sepsis workup was initiated    General: +resp distress  HEENT: Normocephalic, atraumatic, EOMI, no scleral icterus  CV: +tachy, +s1,s2  Lungs: coarse BS B/L  Abd: Soft, non-tender, non-distended, +BSx4, no rebound or guarding, no visible echymosses  Ext: No clubbing, cyanosis, or edema  Neuro: following commands intermittently  MAEx4     Plan:  Patient was placed on BiPAP due to increased WOB  Sepsis workup was initiated  Patient to be transferred to medical ICU  Consideration for EEG as patient recently had a seizure on this admission  Follow-up labs including lactic acid, procalcitonin, CMP, INR  Her tachypnea and hypoxia likely related to sepsis 2/2 to PNA  -Started on Flagyl, Cefepime and Vancomycin     -Sepsis initial assessment completed  -Rectal Tylenol for fever  Hold off on further lasix  F/u Labs  Updated patients wen [place]

## 2018-05-01 NOTE — SPEECH THERAPY NOTE
Speech-Language Pathology Bedside Swallow Evaluation      Patient Name: Edward Mcbride    SVXGX'D Date: 4/30/2018     Problem List  Patient Active Problem List   Diagnosis    Essential hypertension    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 native heart with angina pectoris ([facility] )    Type 2 diabetes mellitus with complication, with long-term current use of insulin (HCC)    Atrial flutter (HCC)    Hyperlipidemia    Gastroesophageal reflux disease without esophagitis    Moderate mitral stenosis    Arthritis of hand    Asthma    Asymptomatic carotid artery stenosis, bilateral    Atherosclerosis of artery of extremity with ulceration ([facility] )    Hx of CABG    Chronic anticoagulation    Chronic diastolic congestive heart failure ([facility] )    Degenerative joint disease involving multiple joints    Diabetic retinopathy (HCC)    Postoperative hypothyroidism    Migraine headache    Nephrolithiasis    Osteoarthritis of both knees    Paroxysmal atrial fibrillation (HCC)    Ulcer of toe of left foot (HCC)    Ulcer of toe of right foot (HCC)    Prolonged Q-T interval on ECG    Hypokalemia    Hypocalcemia    Left bundle branch block (LBBB)    1st degree AV block    S/P TAVR (transcatheter aortic valve replacement)    Expressive aphasia    Multiple lacunar infarcts (HCC)    Vomiting    History of CVA (cerebrovascular accident)    Seizure ([facility] )    Supratherapeutic INR    PVC (premature ventricular contraction)    Pulmonary hypertension (HCC)    Anemia       Past Medical History  Past Medical History:   Diagnosis Date    Altered mental status     Anticoagulated     Coumadin for Aflutter    Asthma     Atrial flutter (HCC)     maintained on coumadin anticoag    CAD (coronary artery disease)     Candidiasis     Carotid stenosis, bilateral     Cataract     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) (HCC)     Chronic fatigue syndrome     Chronic low back pain     Concussion w loss of consciousness of unsp duration, init     Coronary artery disease     CVA (cerebral vascular accident) ([facility] ) 4/17/2018    Diabetes mellitus [facility] )     type 2, insulin dependent    DJD (degenerative joint disease)     GERD (gastroesophageal reflux disease)     Herpes zoster     HLD (hyperlipidemia)     HTN (hypertension)     Hyperlipidemia     Hypothyroidism     Irritable bowel syndrome     Lumbar radiculopathy     Lyme disease     Dx in hospital 8/2011    Lyme disease     MI (myocardial infarction) ([facility] )     Migraines     Muscle spasm     Non-neoplastic nevus     Nontoxic multinodular goiter     OA (osteoarthritis)     Osteoarthritis     Other chronic pain     PAD (peripheral artery disease) (HCC)     Palpitations     Pseudogout     Spinal stenosis     Transient cerebral ischemia     Trigger ring finger     Viral gastroenteritis        Past Surgical History  Past Surgical History:   Procedure Laterality Date    APPENDECTOMY      ARTERIOGRAM  12/19/2017    CARDIAC CATHETERIZATION      CHOLECYSTECTOMY      COLONOSCOPY      CORONARY ARTERY BYPASS GRAFT  2004    LUMBAR LAMINECTOMY      CO ECHO TRANSESOPHAG R-T 2D W/PRB IMG ACQUISJ I&R N/A 4/3/2018    Procedure: TRANSESOPHAGEAL ECHOCARDIOGRAM (ROBB);   Surgeon: Osmin Sutton DO;  [facility];  Service: Cardiac Surgery    CO REPLACE AORTIC VALVE OPENFEMORAL ARTERY APPROACH N/A 4/3/2018    Procedure: REPLACEMENT AORTIC VALVE TRANSCATHETER (TAVR) TRANSFEMORAL w/ 26MM IVY YEVGENIY S3 VALVE;  Surgeon: Osmin Sutton DO;  [facility];  Service: Cardiac Surgery    THYROIDECTOMY      TOTAL ABDOMINAL HYSTERECTOMY W/ BILATERAL SALPINGOOPHORECTOMY           Current Medical Status  Judie Rueda is a [de-identified] y o  female with a history of insulin-dependant diabetes, a hypothyroidism, PAD, asthma paroxsymal afib, CAD s/p CABG (2005), aortic stenosis s/p TAVR (4/3/18), recent cardioembolic CVA (4/16/18) with transition from Coumadin to [address] [place] [address] presenting from home with several days of AMS, weakness and now with vomiting, loose stools this AM       The patient was previously admitted from 4/16/18 to 4/19/18 with episodic aphasia  MRI at the time documented bihemispheric multivascular territory punctate infarcts concerning for cardioembolic source  She was on coumadin at the time and was noted to be supratherautic, transitioned to Xarelto for concern coumadin failure  She had been discharged home to her daughter's care       The daughter states that after her previous hospitalization her speech difficulties had been improving and she was doing well physically, but over the last few days she has had notably worsening fatigue  She is normally able to walk with a walker several blocks without issues, but for several days she has been unable to ambulate more than a few feet  Per the daughter she seems both physically fatigued and SOB with minimal exertion  She additionally seems to have some new cognitive deficits, seems notably slower and is responding with one-word answers  Per the daughter her current difficulties with articulation are different from those during her prior admission for stroke  Has additionally been persistently tremulous and complaining of feeling hot  This AM the patient had an epidose of non-bloody vomiting and was brought to the ED  CT AP wo contrast was unremarkable for acute pathology and the patient was sent home  The patient was brought back to the ED again after persistent vomiting  Noted to be confused at the time and was admitted  She has had poor PO intake over last few days, no sick contacts/recent travel  The patient answers in short phrases and when asked if in pain states "I don't feel well," denies abdominal pain but admits to shortness of breath and frontal headache       After admission, pt c tonic-clonic sxs and was intubated (4/29)  Pt extubated 4/30    Swallowing Evaluation requested at this time  Past medical history:  Please see H&P for details    Social/Education/Vocational Hx:  Pt is assited by her dtr & son-in-law  Swallow Information   Current Risks for Dysphagia & Aspiration: CVA, recent intubation and AMS     Current Diet: NPO      Baseline Diet: regular diet and thin liquids      Baseline Assessment   Behavior/Cognition: decreased attention    Speech/Language Status: able to participate in basic conversation, not able to to follow commands and limited verbal output    Patient Positioning: upright in bed    Pain Status/Interventions/Response to Interventions:  No report of or nonverbal indications of pain  Swallow Mechanism Exam   Facial: symmetrical  Labial: unable to test 2/2 limited command following  Lingual: unable to test 2/2 limited command following  Velum: unable to visualize  Mandible: adequate ROM  Dentition: edentulous  Vocal quality:clear/adequate in a limited sample  Volitional Cough: unable to initiate volitional cough         Consistencies Assessed and Performance   Specific materials administered included blank crackers and a few sips of nectar and thin liquid  Pt refused applesauce, pudding & most liquids    Oral Stage: mild-moderate  Mastication was interrupted (cueing required to persist with task) but grossly adequate c limited amt of blank crackers  Bolus formation and transfer were functional with no significant oral residue noted  ? oral control  Pharyngeal Stage: suspected to be mild-moderately impaired  Swallow Mechanics:  Swallowing initiation appeared prompt  Laryngeal rise was palpated and judged to be within functional limits  No coughing, throat clearing, change in vocal quality or respiratory status noted c small amt of blank cracker or nectar thick liquid  Consistent throat clearing noted with thin liquid        Esophageal Concerns: no complaints today      Summary   Pt presented with s/s significant cognitive impairment (POOR attenton, LIMITED comprehension even for simple ?'s/commands and s/s mild/moderately impaired oral/pharyngeal stage swallowing skills with the limited materials administered today    Recommendations: mechanically altered/level 2 diet and nectar thick liquids     Recommended Form of Meds: crushed with puree     Aspiration precautions and compensatory swallowing strategies: upright posture, only feed when fully alert, slow rate of feeding and small bites/sips    Results Reviewed with: patient, RN and PA    Treatment Recommended: yes    Frequency of treatment: 3-5x weekly    Patient Stated Goal:    Dysphagia Goals per SLP:  1   pt will tolerate mech soft diet with nectar thick liquid without s/s of aspiration x24-48 hrs   2  pt will participate in continued swallow eval to determine safety of advanced textures and/or r/o need for further instrumental testing

## 2018-05-01 NOTE — SPEECH THERAPY NOTE
Speech/Language Pathology Progress Note    Patient Name: Tiffanie Givens  XFWIH'Q Date: 5/1/2018    Subjective:  "Crap happens" when asked why she is in the hospital   Recognized "recent stroke" and "seizure"    Objective:  Pt seen for diagnostic dysphagia tx bedside  Pt alert & cooperative c some dfificulty with thought formulation and expression noted  RR noted to vary from 24 to 36 (reported same to RN & CNA)  Interview of CNA revealed that pt c some cough with 1st bites of mechanical soft foods (well moistened & cut pancakes) but that cough subsided c no cough noted c most of meal or with enctar thick liquid  Pt was assessed c bites of powdered/lemon donut, tsps applssauce & honey thick liquid, as well as nectar coffee by straw (and 3 sips of thin ginger ale)  Pt without her dentures, but with grossly effective oral prep & transfer c foods  Swallowing initiation time often appeared to be mildly delayed  Increased RR may have presented pt with difficulty coordinating respriation with swallowing  Cough noted with 2 of 3 sips of thin liquid and with 50% of sips with nectar thick liquid with me  Assessment:  Pt presented as alert & cooperative but with variable RR/effort and s/s delayed swallowing initiation wt coughing noted c nectar thick and thin liquid (recall pt als s/p recent sz, intubation as well as with current variable RR/challenge of coordinating respiration and swallowing)  Plan/Recommendations:  Consider downgrade of liquids to honey thick for maximized safety/minimized sxs  Continue mechanical soft diet  Continue aspiration precautions (feed only when alert, upright, attentive  Feed slowly, small bites/sips    Discontinue c s/s Sz or pharyngeal dysphagia)

## 2018-05-02 PROBLEM — I47.2 NSVT (NONSUSTAINED VENTRICULAR TACHYCARDIA) (HCC): Status: ACTIVE | Noted: 2018-04-29

## 2018-05-02 PROBLEM — I50.42 CHRONIC COMBINED SYSTOLIC AND DIASTOLIC CONGESTIVE HEART FAILURE (HCC): Status: ACTIVE | Noted: 2018-01-15

## 2018-05-02 LAB
ANION GAP SERPL CALCULATED.3IONS-SCNC: 8 MMOL/L (ref 4–13)
BASOPHILS # BLD AUTO: 0.01 THOUSANDS/ΜL (ref 0–0.1)
BASOPHILS NFR BLD AUTO: 0 % (ref 0–1)
BUN SERPL-MCNC: 11 MG/DL (ref 5–25)
CALCIUM SERPL-MCNC: 7.1 MG/DL (ref 8.3–10.1)
CHLORIDE SERPL-SCNC: 107 MMOL/L (ref 100–108)
CO2 SERPL-SCNC: 28 MMOL/L (ref 21–32)
CREAT SERPL-MCNC: 0.62 MG/DL (ref 0.6–1.3)
EOSINOPHIL # BLD AUTO: 0.11 THOUSAND/ΜL (ref 0–0.61)
EOSINOPHIL NFR BLD AUTO: 1 % (ref 0–6)
ERYTHROCYTE [DISTWIDTH] IN BLOOD BY AUTOMATED COUNT: 19 % (ref 11.6–15.1)
GFR SERPL CREATININE-BSD FRML MDRD: 85 ML/MIN/1.73SQ M
GLUCOSE SERPL-MCNC: 125 MG/DL (ref 65–140)
GLUCOSE SERPL-MCNC: 139 MG/DL (ref 65–140)
GLUCOSE SERPL-MCNC: 227 MG/DL (ref 65–140)
GLUCOSE SERPL-MCNC: 232 MG/DL (ref 65–140)
GLUCOSE SERPL-MCNC: 254 MG/DL (ref 65–140)
GLUCOSE SERPL-MCNC: 289 MG/DL (ref 65–140)
HCT VFR BLD AUTO: 26.9 % (ref 34.8–46.1)
HGB BLD-MCNC: 7.9 G/DL (ref 11.5–15.4)
LACTATE SERPL-SCNC: 1.5 MMOL/L (ref 0.5–2)
LYMPHOCYTES # BLD AUTO: 1.72 THOUSANDS/ΜL (ref 0.6–4.47)
LYMPHOCYTES NFR BLD AUTO: 21 % (ref 14–44)
MCH RBC QN AUTO: 20 PG (ref 26.8–34.3)
MCHC RBC AUTO-ENTMCNC: 29.4 G/DL (ref 31.4–37.4)
MCV RBC AUTO: 68 FL (ref 82–98)
MONOCYTES # BLD AUTO: 0.67 THOUSAND/ΜL (ref 0.17–1.22)
MONOCYTES NFR BLD AUTO: 8 % (ref 4–12)
NEUTROPHILS # BLD AUTO: 5.74 THOUSANDS/ΜL (ref 1.85–7.62)
NEUTS SEG NFR BLD AUTO: 70 % (ref 43–75)
NRBC BLD AUTO-RTO: 0 /100 WBCS
PLATELET # BLD AUTO: 218 THOUSANDS/UL (ref 149–390)
PMV BLD AUTO: 9.6 FL (ref 8.9–12.7)
POTASSIUM SERPL-SCNC: 3.3 MMOL/L (ref 3.5–5.3)
POTASSIUM SERPL-SCNC: 4.1 MMOL/L (ref 3.5–5.3)
RBC # BLD AUTO: 3.95 MILLION/UL (ref 3.81–5.12)
SODIUM SERPL-SCNC: 143 MMOL/L (ref 136–145)
WBC # BLD AUTO: 8.28 THOUSAND/UL (ref 4.31–10.16)

## 2018-05-02 PROCEDURE — 94760 N-INVAS EAR/PLS OXIMETRY 1: CPT

## 2018-05-02 PROCEDURE — 92526 ORAL FUNCTION THERAPY: CPT

## 2018-05-02 PROCEDURE — 80048 BASIC METABOLIC PNL TOTAL CA: CPT | Performed by: INTERNAL MEDICINE

## 2018-05-02 PROCEDURE — 84132 ASSAY OF SERUM POTASSIUM: CPT | Performed by: INTERNAL MEDICINE

## 2018-05-02 PROCEDURE — 82948 REAGENT STRIP/BLOOD GLUCOSE: CPT

## 2018-05-02 PROCEDURE — 99222 1ST HOSP IP/OBS MODERATE 55: CPT | Performed by: INTERNAL MEDICINE

## 2018-05-02 PROCEDURE — 85025 COMPLETE CBC W/AUTO DIFF WBC: CPT | Performed by: INTERNAL MEDICINE

## 2018-05-02 PROCEDURE — 83605 ASSAY OF LACTIC ACID: CPT | Performed by: EMERGENCY MEDICINE

## 2018-05-02 PROCEDURE — 99233 SBSQ HOSP IP/OBS HIGH 50: CPT | Performed by: INTERNAL MEDICINE

## 2018-05-02 PROCEDURE — 99232 SBSQ HOSP IP/OBS MODERATE 35: CPT | Performed by: PSYCHIATRY & NEUROLOGY

## 2018-05-02 RX ORDER — FERROUS SULFATE 325(65) MG
325 TABLET ORAL
Status: DISCONTINUED | OUTPATIENT
Start: 2018-05-03 | End: 2018-05-07 | Stop reason: HOSPADM

## 2018-05-02 RX ORDER — MAGNESIUM SULFATE 1 G/100ML
1 INJECTION INTRAVENOUS ONCE
Status: COMPLETED | OUTPATIENT
Start: 2018-05-02 | End: 2018-05-02

## 2018-05-02 RX ORDER — POTASSIUM CHLORIDE 20 MEQ/1
40 TABLET, EXTENDED RELEASE ORAL 2 TIMES DAILY
Status: DISCONTINUED | OUTPATIENT
Start: 2018-05-02 | End: 2018-05-07 | Stop reason: HOSPADM

## 2018-05-02 RX ORDER — FUROSEMIDE 10 MG/ML
40 INJECTION INTRAMUSCULAR; INTRAVENOUS
Status: DISCONTINUED | OUTPATIENT
Start: 2018-05-02 | End: 2018-05-04

## 2018-05-02 RX ORDER — FUROSEMIDE 10 MG/ML
40 INJECTION INTRAMUSCULAR; INTRAVENOUS
Status: DISCONTINUED | OUTPATIENT
Start: 2018-05-02 | End: 2018-05-02

## 2018-05-02 RX ORDER — METOPROLOL TARTRATE 50 MG/1
100 TABLET, FILM COATED ORAL EVERY 12 HOURS SCHEDULED
Status: DISCONTINUED | OUTPATIENT
Start: 2018-05-02 | End: 2018-05-07 | Stop reason: HOSPADM

## 2018-05-02 RX ORDER — LEVETIRACETAM 750 MG/1
750 TABLET ORAL EVERY 12 HOURS SCHEDULED
Status: DISCONTINUED | OUTPATIENT
Start: 2018-05-02 | End: 2018-05-07 | Stop reason: HOSPADM

## 2018-05-02 RX ORDER — POTASSIUM CHLORIDE 20 MEQ/1
40 TABLET, EXTENDED RELEASE ORAL ONCE
Status: COMPLETED | OUTPATIENT
Start: 2018-05-02 | End: 2018-05-02

## 2018-05-02 RX ORDER — POTASSIUM CHLORIDE 14.9 MG/ML
20 INJECTION INTRAVENOUS ONCE
Status: COMPLETED | OUTPATIENT
Start: 2018-05-02 | End: 2018-05-02

## 2018-05-02 RX ADMIN — NYSTATIN: 100000 POWDER TOPICAL at 18:14

## 2018-05-02 RX ADMIN — POTASSIUM CHLORIDE 20 MEQ: 200 INJECTION, SOLUTION INTRAVENOUS at 11:00

## 2018-05-02 RX ADMIN — RIVAROXABAN 20 MG: 20 TABLET, FILM COATED ORAL at 16:57

## 2018-05-02 RX ADMIN — CALCIUM GLUCONATE 2 G: 98 INJECTION, SOLUTION INTRAVENOUS at 13:00

## 2018-05-02 RX ADMIN — FUROSEMIDE 40 MG: 10 INJECTION, SOLUTION INTRAMUSCULAR; INTRAVENOUS at 10:27

## 2018-05-02 RX ADMIN — POTASSIUM CHLORIDE 40 MEQ: 1500 TABLET, EXTENDED RELEASE ORAL at 18:16

## 2018-05-02 RX ADMIN — ASPIRIN 81 MG 81 MG: 81 TABLET ORAL at 10:27

## 2018-05-02 RX ADMIN — LEVOTHYROXINE SODIUM 100 MCG: 100 TABLET ORAL at 07:29

## 2018-05-02 RX ADMIN — FUROSEMIDE 40 MG: 10 INJECTION, SOLUTION INTRAMUSCULAR; INTRAVENOUS at 18:14

## 2018-05-02 RX ADMIN — METOPROLOL TARTRATE 100 MG: 50 TABLET ORAL at 21:10

## 2018-05-02 RX ADMIN — MAGNESIUM SULFATE HEPTAHYDRATE 1 G: 1 INJECTION, SOLUTION INTRAVENOUS at 10:24

## 2018-05-02 RX ADMIN — Medication 1 TABLET: at 21:10

## 2018-05-02 RX ADMIN — LEVETIRACETAM 750 MG: 750 TABLET ORAL at 21:10

## 2018-05-02 RX ADMIN — METRONIDAZOLE 500 MG: 500 INJECTION, SOLUTION INTRAVENOUS at 03:32

## 2018-05-02 RX ADMIN — FLUTICASONE PROPIONATE 1 PUFF: 110 AEROSOL, METERED RESPIRATORY (INHALATION) at 21:10

## 2018-05-02 RX ADMIN — ATORVASTATIN CALCIUM 80 MG: 80 TABLET, FILM COATED ORAL at 16:57

## 2018-05-02 RX ADMIN — LORATADINE 10 MG: 10 TABLET ORAL at 10:25

## 2018-05-02 RX ADMIN — FLUTICASONE PROPIONATE 1 PUFF: 110 AEROSOL, METERED RESPIRATORY (INHALATION) at 10:23

## 2018-05-02 RX ADMIN — LEVETIRACETAM 750 MG: 100 INJECTION, SOLUTION INTRAVENOUS at 10:38

## 2018-05-02 RX ADMIN — POTASSIUM CHLORIDE 40 MEQ: 1500 TABLET, EXTENDED RELEASE ORAL at 07:41

## 2018-05-02 RX ADMIN — NYSTATIN: 100000 POWDER TOPICAL at 10:41

## 2018-05-02 RX ADMIN — Medication 20 MG: at 12:28

## 2018-05-02 RX ADMIN — INSULIN LISPRO 3 UNITS: 100 INJECTION, SOLUTION INTRAVENOUS; SUBCUTANEOUS at 13:01

## 2018-05-02 RX ADMIN — INSULIN LISPRO 4 UNITS: 100 INJECTION, SOLUTION INTRAVENOUS; SUBCUTANEOUS at 18:07

## 2018-05-02 RX ADMIN — VANCOMYCIN HYDROCHLORIDE 1250 MG: 10 INJECTION, POWDER, LYOPHILIZED, FOR SOLUTION INTRAVENOUS at 07:23

## 2018-05-02 NOTE — SPEECH THERAPY NOTE
Speech Language/Pathology    Speech/Language Pathology Progress Note    Patient Name: Guillermo Malhotra  MDDGH'Q Date: 5/2/2018     Problem List  Patient Active Problem List   Diagnosis    Essential hypertension    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 native heart without angina pectoris    Type 2 diabetes mellitus with complication, with long-term current use of insulin (HCC)    Atrial flutter (HCC)    Hyperlipidemia    Gastroesophageal reflux disease without esophagitis    Moderate mitral stenosis    Arthritis of hand    Acute respiratory failure with hypoxia ([address] )    Asthma    Asymptomatic carotid artery stenosis, bilateral    Atherosclerosis of artery of extremity with ulceration ([facility] )    Hx of CABG    Chronic anticoagulation    Chronic combined systolic and diastolic congestive heart failure (HCC)    Degenerative joint disease involving multiple joints    Diabetic retinopathy (HCC)    Postoperative hypothyroidism    Migraine headache    Nephrolithiasis    Osteoarthritis of both knees    Paroxysmal atrial fibrillation (HCC)    Ulcer of toe of left foot (HCC)    Ulcer of toe of right foot (HCC)    Prolonged Q-T interval on ECG    Hypokalemia    Hypocalcemia    Left bundle branch block (LBBB)    1st degree AV block    S/P TAVR (transcatheter aortic valve replacement)    Expressive aphasia    Multiple lacunar infarcts (HCC)    Vomiting    History of CVA (cerebrovascular accident)    Seizure ([facility] )    Supratherapeutic INR    NSVT (nonsustained ventricular tachycardia) (HCC)    Pulmonary hypertension (HCC)    Anemia    Severe sepsis (HCC)    Lactic acidosis        Past Medical History  Past Medical History:   Diagnosis Date    Altered mental status     Anticoagulated     Coumadin for Aflutter    Asthma     Atrial flutter (HCC)     maintained on coumadin anticoag    CAD (coronary artery disease)     Candidiasis     Carotid stenosis, bilateral     Cataract     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) ([facility]     Chronic fatigue syndrome     Chronic low back pain     Concussion w loss of consciousness of unsp duration, init     Coronary artery disease     CVA (cerebral vascular accident) ([facility] ) 4/17/2018    Diabetes [facility] )     type 2, insulin dependent    DJD (degenerative joint disease)     GERD (gastroesophageal reflux disease)     Herpes zoster     HLD (hyperlipidemia)     HTN (hypertension)     Hyperlipidemia     Hypothyroidism     Irritable bowel syndrome     Lumbar radiculopathy     Lyme disease     Dx in hospital 8/2011    Lyme disease     MI (myocardial infarction) ([facility] )     Migraines     Muscle spasm     Non-neoplastic nevus     Nontoxic multinodular goiter     OA (osteoarthritis)     Osteoarthritis     Other chronic pain     [facility]AD [facility]peripheral artery disease) ([facility]     Palpitations     Pseudogout     Spinal stenosis     Transient cerebral ischemia     Trigger ring finger     Viral gastroenteritis         Past Surgical History  Past Surgical History:   Procedure Laterality Date    APPENDECTOMY      ARTERIOGRAM  12/19/2017    CARDIAC CATHETERIZATION      CHOLECYSTECTOMY      COLONOSCOPY      CORONARY ARTERY BYPASS GRAFT  2004    LUMBAR LAMINECTOMY      CA ECHO TRANSESOPHAG R-T 2D W/PRB IMG ACQUISJ I&R N/A 4/3/2018    Procedure: TRANSESOPHAGEAL ECHOCARDIOGRAM (ROBB); Surgeon: Syed Rosado DO;  [facility];  Service: Cardiac Surgery    CA REPLACE AORTIC VALVE OPENFEMORAL ARTERY APPROACH N/A 4/3/2018    Procedure: REPLACEMENT AORTIC VALVE TRANSCATHETER (TAVR) TRANSFEMORAL w/ 26MM IVY YEVGENIY S3 VALVE;  Surgeon: Syed Rosado DO;  [facility];  Service: Cardiac Surgery    THYROIDECTOMY      TOTAL ABDOMINAL HYSTERECTOMY W/ BILATERAL SALPINGOOPHORECTOMY           Subjective:  Pt seen for dysphagia tx, after speaking with RN   Pt sitting upright in recliner  Objective:  Rapid Response was called on pt yesterday, due to respiratory distress  Physician notes suggest CXR showed likely CHF  Pt was transferred to MICU, but will be transferred back to the regular floor this afternoon  Pt is on nasal cannula at this time  RN reports pt ate breakfast, feeding herself,  without difficulty  RN requests that supervision during meals be changed from full to intermittent if appropriate  Pt ate 1/2 a banana, with adequate bolus retrieval, mastication, bolus formation and transfer  Pharyngeal swallows were prompt, and no s/s of aspiration  Pt is edentulous and dentures are at home (she does typically wear them while eating), therefore more challenging solid consistencies were not attempted  Pt drank 3 oz of HTL by cup and 3 oz NTL by cup  She needed occasional cues to take small sips, and she did comply  There were no s/s of aspiration with the HTL, with swallows appearing prompt  Slight throat clear noted x1 while drinking NTL, but no other s/s of aspiration with the NTL  Pt was asked to have a friend or family member bring in her dentures, in order to trial more challenging solid food consistencies  Assessment:  Good toleration of dysphagia 2/mech soft consistency and HTL  Pt also appeared to tolerate NTL except a slight throat clear x1  Given respiratory issues, plan is to remain on HTL for now, and trial NTL and possibly thin liquids with ST  Supervision during meals can be changed to intermittent after tray is setup  Plan/Recommendations:  Continue with dysphagia 2 and HTL for now  Intermittent supervision with meals, remind pt to eat/drink slowly  Aspiration precautions  Trial thinner liquids; trial dysphagia 3 and regular foods when pt has dentures

## 2018-05-02 NOTE — PROGRESS NOTES
Progress Note - Critical Care   Angélica Grover [de-identified] y o  female MRN: [id number]  Unit/Bed#: MICU 02 Encounter: [id number]          ______________________________________________________________________  Assessment  Patient Active Problem List   Diagnosis    Essential hypertension    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 native heart with angina pectoris ([facility] )    Type 2 diabetes mellitus with complication, with long-term current use of insulin ([facility])    Atrial flutter (HCC)    Hyperlipidemia    Gastroesophageal reflux disease without esophagitis    Moderate mitral stenosis    Arthritis of hand    Acute respiratory failure with hypoxia ([facility] )    Asthma    Asymptomatic carotid artery stenosis, bilateral    Atherosclerosis of artery of extremity with ulceration ([facility] )    Hx of CABG    Chronic anticoagulation    Chronic diastolic congestive heart failure (HCC)    Degenerative joint disease involving multiple joints    Diabetic retinopathy (HCC)    Postoperative hypothyroidism    Migraine headache    Nephrolithiasis    Osteoarthritis of both knees    Paroxysmal atrial fibrillation (HCC)    Ulcer of toe of left foot (HCC)    Ulcer of toe of right foot (HCC)    Prolonged Q-T interval on ECG    Hypokalemia    Hypocalcemia    Left bundle branch block (LBBB)    1st degree AV block    S/P TAVR (transcatheter aortic valve replacement)    Expressive aphasia    Multiple lacunar infarcts (HCC)    Vomiting    History of CVA (cerebrovascular accident)    Seizure ([facility] )    Supratherapeutic INR    PVC (premature ventricular contraction)    Pulmonary hypertension (HCC)    Anemia    Severe sepsis (HCC)    Lactic acidosis       Plan:     Neuro: GCS 15, pt is A&Ox3  1  Acute Encephalopathy: Improved  Likely secondary to resp failure  Pt did have a seizure on 4/29, but cEEG negative for 24hrs after  Consider repeating cEEG if no improvement today     2  Seizure: continue keppra, neurology following  3  H/o CVA: continue aspirin and statin  CV:   1  HTN: Continue metoprolol  2  Afib: Pt recently failed coumadin therapy, and transitioned to xeralto  Continue Xeralto, and monitor h/h daily  Pulm:   1  Acute hypoxic respiratory failure - likely secondary to pneumonia given fever and leukocytosis, though CHF could also be the cause given CXR appearance  O2 stable overnight continue to wean off BiPAP  2  Multilobar pneumonia: Continue abx day 2 of 7    GI:   1  Peptic ulcer prophylaxis: omeprazole   2  Bowel Regimen: PRN senna    : Urine output appropriate, continue to monitor  F/E/N:   1  Diet: Return to honey thick, mechanical soft diet as per speech therapy recommendations  2  IVF: not indicated if she can start on an oral diet  3  Electrolytes: replete K (3 3) with 40mEq IV  ID:   1  Severe sepsis - questionable diagnosis given quick resolution  If present, likely secondary to aspiration pneumonia from her recent seizure  Continue vanc/cefepime/flagyl day 2/7  Continue to monitor for fever and leukocytosis, though both have improved since she was transferred  F/u Bcx  Heme:   1  Hgb: 7 9 stable over the last few days, though her baseline has been higher in the past, continue to monitor hgb daily  2  WBC: 14 8 to 8 28  3  DVT ppx: on xeralto    Endo:   1  DMII - BG stable, continue current regimen     Msk/Skin: Routine skin care    Disposition: Remain in ICU    Code Status: Level 1 - Full Code    ______________________________________________________________________    Chief Complaint: Acute encephalopathy    24 Hour Events:  Rapid response called and patient while she was on the floor yesterday for respiratory distress  The patient was placed on BiPAP and brought to the ICU  At that time she was found to have a leukocytosis, fever, and chest x-ray suggestive of a pneumonia vs CHF  She meets SIRS criteria and severe sepsis was suspected    She was started on broad-spectrum antibiotics yesterday and blood cultures were taken  Overnight the patient has status has been stable  She has not re- spiked a fever overnight         ______________________________________________________________________    Physical Exam:     Physical Exam   Constitutional: She is oriented to person, place, and time  She appears well-developed and well-nourished  No distress  HENT:   Head: Normocephalic and atraumatic  Eyes: EOM are normal  Pupils are equal, round, and reactive to light  Neck: Normal range of motion  Neck supple  Cardiovascular: Normal rate, normal heart sounds and intact distal pulses  Pulmonary/Chest: Effort normal and breath sounds normal  No respiratory distress  She has no wheezes  She has no rales  Abdominal: Soft  There is no tenderness  Musculoskeletal: Normal range of motion  Neurological: She is alert and oriented to person, place, and time  Normal strength ion all extremities   Skin: Skin is warm and dry  Capillary refill takes less than 2 seconds  Nursing note and vitals reviewed  ______________________________________________________________________  Vitals:    18 2200 18 2300 18 0000 18 0710   BP: 107/58 108/56 95/54    BP Location:   Left arm    Pulse: 80 72 74    Resp: (!) 26 (!) 24 (!) 26    Temp:   98 9 °F (37 2 °C)    TempSrc:   Oral    SpO2: 99% 98% 99% 96%   Weight:       Height:         Arterial Line BP: 102/40  Arterial Line MAP (mmHg): 62 mmHg     Temperature:   Temp (24hrs), Av 9 °F (37 2 °C), Min:98 6 °F (37 °C), Max:99 6 °F (37 6 °C)    Current Temperature: 98 9 °F (37 2 °C)  Weights:   IBW: 54 7 kg    Body mass index is 28 65 kg/m²    Weight (last 2 days)     Date/Time   Weight    18 0600  75 7 (166 89)    18 1855  75 3 (166 01)            Hemodynamic Monitoring:  N/A     Non-Invasive/Invasive Ventilation Settings:  Respiratory    Lab Data (Last 4 hours)    None         O2/Vent Data (Last 4 hours)    None              Lab Results   Component Value Date    PHART 7 295 (L) 05/01/2018    ROJ4BHN 40 5 05/01/2018    PO2ART 74 0 (L) 05/01/2018    EWG0FDL 19 3 (L) 05/01/2018    BEART -6 8 05/01/2018    SOURCE Radial, Right 05/01/2018     SpO2: SpO2: 96 %  Intake and Outputs:  I/O       04/30 0701 - 05/01 0700 05/01 0701 - 05/02 0700    P  O  30 780    I V  (mL/kg) 32 (0 4)     IV Piggyback 300 700    Total Intake(mL/kg) 362 (4 8) 1480 (19 6)    Urine (mL/kg/hr) 585 (0 3) 1675 (0 9)    Total Output 585 1675    Net -223 -071              Nutrition:        Diet Orders            Start     Ordered    05/02/18 0731  Diet Dysphagia/Modified Consistency; Dysphagia 2-Mechanical Soft; Honey Thick Liquid  Diet effective now     Question Answer Comment   Diet Type Dysphagia/Modified Consistency    Dysphagia/Modified Consistency Dysphagia 2-Mechanical Soft    Liquid Modifier Honey Thick Liquid    RD to adjust diet per protocol? Yes        05/02/18 0730        Labs:     Results from last 7 days  Lab Units 05/02/18  0452 05/01/18  1621 05/01/18  0533  04/30/18  0540  04/28/18  2140   WBC Thousand/uL 8 28 14 78* 7 35  --  8 24  8 24  < > 9 60   HEMOGLOBIN g/dL 7 9* 9 0* 8 4*  < > 7 5*  7 5*  < > 9 2*   I STAT HEMOGLOBIN   --   --   --   --   --   < >  --    HEMATOCRIT % 26 9* 29 9* 28 4*  < > 24 7*  24 7*  < > 30 1*   PLATELETS Thousands/uL 218 318 239  --  210  210  < > 309   NEUTROS PCT % 70  --   --   --  77*  --  74   MONOS PCT % 8  --   --   --  7  --  6   MONO PCT MAN %  --  7  --   --   --   --   --    < > = values in this interval not displayed      Results from last 7 days  Lab Units 05/02/18  0452 05/01/18  1757 05/01/18  1621  04/29/18  1029  04/29/18  0450   SODIUM mmol/L 143 140 140  < > 141  --  139   POTASSIUM mmol/L 3 3* 4 0 4 3  < > 3 7  --  3 5   CHLORIDE mmol/L 107 106 108  < > 106  --  108   CO2 mmol/L 28 26 22  < > 11*  --  15*   BUN mg/dL 11 12 11  < > 14  --  12   CREATININE mg/dL 0 62 0 85 0 86  < > 1 13  --  0 82   CALCIUM mg/dL 7 1* 7 0* 7 2*  < > 6 3*  --  5 9*   TOTAL PROTEIN g/dL  --  6 8  --   --  7 5  --  7 3   BILIRUBIN TOTAL mg/dL  --  0 79  --   --  0 83  --  0 81   ALK PHOS U/L  --  95  --   --  73  --  70   ALT U/L  --  23  --   --  21  --  19   AST U/L  --  44  --   --  43  --  30   GLUCOSE RANDOM mg/dL 125 315* 339*  < > 204*  --  132   GLUCOSE, ISTAT   --   --   --   --   --   < >  --    < > = values in this interval not displayed      Results from last 7 days  Lab Units 05/01/18  1621 05/01/18  1026 05/01/18  0513   MAGNESIUM mg/dL 2 2 1 8 1 7  1 6     Lab Results   Component Value Date    PHOS 2 9 05/01/2018    PHOS 2 6 05/01/2018    PHOS 3 1 04/30/2018        Results from last 7 days  Lab Units 05/01/18  1757 05/01/18  0542 04/30/18  0540 04/29/18  1538 04/29/18  1028   INR  1 75* 1 61* 3 23* 6 94* 10 50*   PTT seconds  --   --  47* 53* 62*       0  Lab Value Date/Time   TROPONINI 0 03 05/01/2018 1621   TROPONINI 0 03 04/28/2018 0913   TROPONINI 0 06 (H) 04/16/2018 1326   TROPONINI 2 02 (H) 03/16/2018 0508   TROPONINI 2 31 (H) 03/16/2018 0208   TROPONINI 2 20 (H) 03/15/2018 2302   TROPONINI 1 98 (H) 03/15/2018 1938   TROPONINI 1 61 (H) 03/15/2018 1629   TROPONINI 0 56 (H) 03/15/2018 1331   TROPONINI 2 38 (H) 12/20/2017 0806   TROPONINI 2 85 (H) 12/20/2017 0537   TROPONINI 2 88 (H) 12/20/2017 0537   TROPONINI 1 34 (H) 12/19/2017 2141   TROPONINI 0 43 (H) 12/19/2017 1655   TROPONINI 0 02 12/19/2017 1424       Results from last 7 days  Lab Units 05/02/18  0452 05/01/18  1721 04/29/18  1538   LACTIC ACID mmol/L 1 5 3 2* 1 0     ABG:  Lab Results   Component Value Date    PHART 7 295 (L) 05/01/2018    [id number] 40 5 05/01/2018    PO2ART 74 0 (L) 05/01/2018    SUX8UXD 19 3 (L) 05/01/2018    BEART -6 8 05/01/2018    SOURCE Radial, Right 05/01/2018     Imaging: CXR 5/1 - Extensive bilateral hazy airspace disease throughout both lungs with a small left basilar effusion may indicate CHF   Multilobar pneumonia is less likely part of the differential diagnosis  I have personally reviewed pertinent reports  Micro:  No results found for: Merlin Pulse, SPUTUM[employer]UR  Allergies:    Allergies   Allergen Reactions    Other Chest Pain     IVP-listed as "chest pain" in previous chart, patient stated this occurred "a long time ago" but does not remember exactly occurred     Cephalosporins Chest Pain    Contrast [Iodinated Diagnostic Agents] Other (See Comments)     Flash pulm edema    Doxycycline Chest Pain    Levaquin [Levofloxacin] Chest Pain    Ondansetron Chest Pain     Prolonged QT    Toradol [Ketorolac Tromethamine] Chest Pain     Medications:   Scheduled Meds:    Current Facility-Administered Medications:  acetaminophen 650 mg Rectal Q4H PRN Paco Acuna PA-C    acetaminophen 650 mg Oral Q6H PRN Hadley Shira Tyler,     albuterol 2 5 mg Nebulization Q4H PRN Eliana Ortiz MD    aspirin 81 mg Oral Daily Wyvonna Dakins, DO    atorvastatin 80 mg Oral Daily With Skip MD Kanika    cefepime 2,000 mg Intravenous Q12H Wyvonna Dakins, DO Last Rate: Stopped (05/01/18 1918)   fluticasone 1 puff Inhalation Q12H [facility] Vilma Bolaños MD    influenza vaccine 0 5 mL Intramuscular Prior to discharge Eliana Ortiz MD    insulin lispro 1-6 Units Subcutaneous Q6H [facility] Paco Acuna PA-C    labetalol 10 mg Intravenous Q6H PRN Chase Estevez DO    levETIRAcetam 750 mg Intravenous Q12H [facility] Sarah Rico MD Last Rate: Stopped (05/01/18 2312)   levothyroxine 100 mcg Oral Daily Vilma Bolaños MD    loratadine 10 mg Oral Daily Vilma Bolaños MD    LORazepam 2 mg Intravenous PRN Chase Estevez DO    metoclopramide 10 mg Intravenous Q6H PRN Austen Chu MD    metoprolol tartrate 75 mg Oral Q12H [facility] Tomas Melo DO    metroNIDAZOLE 500 mg Intravenous Q8H Wyvonna Dakins, DO Last Rate: 500 mg (05/02/18 0332)   nystatin  Topical BID Chase Estevez DO    omeprazole (PRILOSEC) suspension 2 mg/mL 20 mg Oral Daily Nancy Estevez DO    polyethylene glycol 17 g Oral Daily PRN Silvia Conn MD    potassium chloride 40 mEq Oral Once Shine Moore MD    rivaroxaban 20 mg Oral Daily With The DO Roseann    senna-docusate sodium 1 tablet Oral HS Ashlie James PA-C    vancomycin 15 mg/kg Intravenous Q12H Tara Hylton DO Last Rate: 1,250 mg (05/02/18 0723)     Continuous Infusions:   PRN Meds:    acetaminophen 650 mg Q4H PRN   acetaminophen 650 mg Q6H PRN   albuterol 2 5 mg Q4H PRN   influenza vaccine 0 5 mL Prior to discharge   labetalol 10 mg Q6H PRN   LORazepam 2 mg PRN   metoclopramide 10 mg Q6H PRN   polyethylene glycol 17 g Daily PRN     VTE Pharmacologic Prophylaxis: Sequential compression device (Venodyne)   VTE Mechanical Prophylaxis: sequential compression device  Invasive lines and devices: Invasive Devices     Peripheral Intravenous Line            Peripheral IV 04/29/18 Right Arm 3 days    Peripheral IV 05/01/18 Left Antecubital 1 day                     Portions of the record may have been created with voice recognition software  Occasional wrong word or "sound a like" substitutions may have occurred due to the inherent limitations of voice recognition software  Read the chart carefully and recognize, using context, where substitutions have occurred      Shine Moore MD

## 2018-05-02 NOTE — PROGRESS NOTES
Progress Note - ICU Transfer to Step down/med  surg  Jacquie Jimenez [de-identified] y o  female MRN: [id number]    Unit/Bed#: MICU 02 Encounter: [id number]      Code Status: Level 1 - Full Code    Date of ICU admission: 5/1    Reason for ICU adm: Respiratory distress 2/2 CHF     Active problems:   Patient Active Problem List   Diagnosis    Essential hypertension    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 native heart without angina pectoris    Type 2 diabetes mellitus with complication, with long-term current use of insulin (HCC)    Atrial flutter (HCC)    Hyperlipidemia    Gastroesophageal reflux disease without esophagitis    Moderate mitral stenosis    Arthritis of hand    Acute respiratory failure with hypoxia ([address] )    Asthma    Asymptomatic carotid artery stenosis, bilateral    Atherosclerosis of artery of extremity with ulceration [facility]    Hx of CABG    Chronic anticoagulation    Chronic combined systolic and diastolic congestive heart failure (HCC)    Degenerative joint disease involving multiple joints    Diabetic retinopathy (HCC)    Postoperative hypothyroidism    Migraine headache    Nephrolithiasis    Osteoarthritis of both knees    Paroxysmal atrial fibrillation (HCC)    Ulcer of toe of left foot (HCC)    Ulcer of toe of right foot (HCC)    Prolonged Q-T interval on ECG    Hypokalemia    Hypocalcemia    Left bundle branch block (LBBB)    1st degree AV block    S/P TAVR (transcatheter aortic valve replacement)    Expressive aphasia    Multiple lacunar infarcts (HCC)    Vomiting    History of CVA (cerebrovascular accident)    [facility]ure ([facility]    Supratherapeutic INR    NSVT (nonsustained ventricular tachycardia) (HCC)    Pulmonary hypertension (HCC)    Anemia    Severe sepsis (HCC)    Lactic acidosis       Summary of clinical course: Pt was a rapid response on 5/1 for respiratory distress  CXR was suggestive of CHF   She was placed on bipap and given lasix with 600cc diuresis  She had improved this morning with normal mental status  BiPap was discontinued and she has been saturating well this morning on NC  Handof report was given to Cornelio Zamora and she is to be transferred to [facility], Dr Brett Garcia on Med/Surg      Recent or scheduled procedures: None    Outstanding/pending diagnostics: None    Hospital discharge planning:  Per medical team

## 2018-05-02 NOTE — PROGRESS NOTES
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 with atrial fibrillation  INR was noted to be therapeutic at that time and she was changed to Xarelto  MRI brain with seizure protocol pending to evaluate for new ischemic stroke  -  Transferred to ICU secondary to respiratory distress, CHF was noted on xray, she was placed on bipap and given lasix, mental status improved  Leatha Arauz   - Continue on Keppra 750 mg Q12H       - Continue on atorvastatin 80 mg QPM/ASA 81 mg QD    -  Continue supportive care, medicine team treating underlying metabolic deragnements     - PT/OT/Speech therapy  - Stroke education    - Seizure precautions  - Monitor exam and notify with changes       Subjective: The patient was transferred to the MICU overnight, secondary to respiratory distress, she was found to be in CHF and was diuresed with Lasix  Currently she is awake oriented x3 sitting in a chair, her mental status has improved and now she is off the BiPAP  She is being transferred to the floor  She offers no new complaints other than she is short of breath  She denies any one-sided weakness or ataxia or sensory loss  He does report she remembers me from a prior admission  I was able to answer all her questions she had about what may have occurred and treatments and medications we have her on  Reviewed with nursing at bedside who reports that patient is doing well and will be transferred out the unit  ROS:  Negative as per hpi    Vitals: Blood pressure 133/75, pulse 74, temperature 98 9 °F (37 2 °C), temperature source Oral, resp   rate (!) 26, height 5' 4" (1 626 m), weight 75 7 kg (166 lb 14 2 oz), SpO2 96 %, not currently breastfeeding  ,Body mass index is 28 65 kg/m²  Current Facility-Administered Medications:  acetaminophen 650 mg Rectal Q4H PRN Parth Duenas PA-C    acetaminophen 650 mg Oral Q6H PRN [facility], DO    albuterol 2 5 mg Nebulization Q4H PRN Baron Hernán MD    aspirin 81 mg Oral Daily Lizbeth Starch, DO    atorvastatin 80 mg Oral Daily With Alessandra Nicholson MD    calcium gluconate 2 g Intravenous Once RAMO Pryor    [START ON 5/3/2018] ferrous sulfate 325 mg Oral Daily With Breakfast Max Estiven, DO    fluticasone 1 puff Inhalation Q12H [address] Srini De Souza MD    furosemide 40 mg Intravenous BID (diuretic) Jane Mcdonnell MD    influenza vaccine 0 5 mL Intramuscular Prior to discharge Baron Hernán MD    insulin lispro 1-6 Units Subcutaneous Q6H [address] Parth Duenas PA-C    labetalol 10 mg Intravenous Q6H PRN Lanny Estevez, DO    levETIRAcetam 750 mg Intravenous Q12H [address] Ina Snow MD Last Rate: 750 mg (05/02/18 1038)   levothyroxine 100 mcg Oral Daily Vilma Bolaños MD    loratadine 10 mg Oral Daily Vilma Bolaños MD    LORazepam 2 mg Intravenous PRN Alesia Andrade, DO    metoclopramide 10 mg Intravenous Q6H PRN Srini De Souza MD    metoprolol tartrate 100 mg Oral Q12H Bernice Knowles MD    nystatin  Topical BID Lanny Estevez, DO    omeprazole (PRILOSEC) suspension 2 mg/mL 20 mg Oral Daily Flaco Estevez, DO    polyethylene glycol 17 g Oral Daily PRN Ina Snow MD    potassium chloride 40 mEq Oral BID Jane Mcdonnell MD    potassium chloride 20 mEq Intravenous Once Jane Mcdonnell MD    rivaroxaban 20 mg Oral Daily With [facility],     senna-docusate sodium 1 tablet Oral HS Ashlie James PA-C      Physical Exam:     Constitutional - in NAD, sitting in chair NC in place  HEENT - NC/AT  Cardiac - No murmur noted, rate irregular  Lungs - No respiratory distress noted     Abdomen - Soft and non tender, non distended  Extremities - No edema noted, venodynes in place     Neurological    Mental status - the patient is awake alert oriented x 2, not year, with no evidence of aphasia or dysarthria, patient is able to follow simple commands, is able to follow complex commands  No paraphasic errors noted  She was able to hold conversation with examiner and remembers from her prior visit, she was also joking in the room  She was able to repeat, read name tag, and tell me color of glove, jacket and   Cranial nerves 2 through 12 are intact    Motor - She did have a mild drift with outstretched hands on the left, her power shoulder 4/5,  4/5 otherwise 5/5 jeffries on the right, on the left shoulders same 4/5 shoulders  4/5    Lowers she was able to lift leg off bed, with some hip flexion weakness, dorsi and plantar flexion intact  No tremor noted       Rapid movements are equal bilaterally bilaterally    Sensation - nonfocal to touch (grossly)    Coordination -  no ataxia noted on finger-to-nose     Toes are equivocal     No evidence of clonus or myoclonus physical lesions or tremor    Lab, Imaging and other studies:     Recent Results (from the past 24 hour(s))   Fingerstick Glucose (POCT)    Collection Time: 05/01/18 10:49 AM   Result Value Ref Range    POC Glucose 278 (H) 65 - 140 mg/dl   ECG 12 lead    Collection Time: 05/01/18  2:50 PM   Result Value Ref Range    Ventricular Rate 127 BPM    Atrial Rate 133 BPM    TN Interval  ms    QRSD Interval 138 ms    QT Interval 362 ms    QTC Interval 526 ms    P Axis  degrees    QRS Axis -26 degrees    T Wave Axis 104 degrees   ECG 12 lead    Collection Time: 05/01/18  2:52 PM   Result Value Ref Range    Ventricular Rate 133 BPM    Atrial Rate 133 BPM    TN Interval  ms    QRSD Interval 144 ms    QT Interval 366 ms    QTC Interval 544 ms    P Axis  degrees    QRS Axis -31 degrees    T Wave Axis 108 degrees   Fingerstick Glucose (POCT)    Collection Time: 05/01/18  4:16 PM   Result Value Ref Range    POC Glucose 370 (H) 65 - 140 mg/dl   CBC    Collection Time: 05/01/18  4:21 PM   Result Value Ref Range    WBC 14 78 (H) 4 31 - 10 16 Thousand/uL    RBC 4 33 3 81 - 5 12 Million/uL    Hemoglobin 9 0 (L) 11 5 - 15 4 g/dL    Hematocrit 29 9 (L) 34 8 - 46 1 %    MCV 69 (L) 82 - 98 fL    MCH 20 8 (L) 26 8 - 34 3 pg    MCHC 30 1 (L) 31 4 - 37 4 g/dL    RDW 19 1 (H) 11 6 - 15 1 %    Platelets 168 990 - 795 Thousands/uL    MPV 10 1 8 9 - 12 7 fL   Basic metabolic panel    Collection Time: 05/01/18  4:21 PM   Result Value Ref Range    Sodium 140 136 - 145 mmol/L    Potassium 4 3 3 5 - 5 3 mmol/L    Chloride 108 100 - 108 mmol/L    CO2 22 21 - 32 mmol/L    Anion Gap 10 4 - 13 mmol/L    BUN 11 5 - 25 mg/dL    Creatinine 0 86 0 60 - 1 30 mg/dL    Glucose 339 (H) 65 - 140 mg/dL    Calcium 7 2 (L) 8 3 - 10 1 mg/dL    eGFR 64 ml/min/1 73sq m   Magnesium    Collection Time: 05/01/18  4:21 PM   Result Value Ref Range    Magnesium 2 2 1 6 - 2 6 mg/dL   Phosphorus    Collection Time: 05/01/18  4:21 PM   Result Value Ref Range    Phosphorus 2 9 2 3 - 4 1 mg/dL   Troponin I    Collection Time: 05/01/18  4:21 PM   Result Value Ref Range    Troponin I 0 03 <=0 04 ng/mL   Manual Differential (Non Wam)    Collection Time: 05/01/18  4:21 PM   Result Value Ref Range    Segmented % 73 %    Lymphocytes % 17 %    Monocytes % 7 4 - 12 %    Eosinophils % 3 0 - 6 %    Lymphocytes Absolute 2 51 0 60 - 4 47 Thousand/uL    Monocytes Absolute 1 03 0 00 - 1 22 Thousand/uL    Eosinophils Absolute 0 44 0 00 - 0 61 Thousand/uL    Total Counted 100     Anisocytosis Present     Poikilocytes Present     Platelet Estimate Adequate Adequate   Blood gas, arterial    Collection Time: 05/01/18  4:22 PM   Result Value Ref Range    pH, Arterial 7 295 (L) 7 350 - 7 450    pCO2, Arterial 40 5 36 0 - 44 0 mm Hg    pO2, Arterial 74 0 (L) 75 0 - 129 0 mm Hg    HCO3, Arterial 19 3 (L) 22 0 - 28 0 mmol/L    Base Excess, Arterial -6 8 mmol/L    O2 Content, Arterial 12 9 (L) 16 0 - 23 0 mL/dL    O2 HGB,Arterial  91 1 (L) 94 0 - 97 0 %    SOURCE Radial, Right     ZAY TEST Yes     ROOM AIR FIO2 100 %   Lactic Acid STAT and in 2 hours if first result greater than 2    Collection Time: 05/01/18  5:21 PM   Result Value Ref Range    LACTIC ACID 3 2 (HH) 0 5 - 2 0 mmol/L   Procalcitonin    Collection Time: 05/01/18  5:57 PM   Result Value Ref Range    Procalcitonin 0 10 <=0 25 ng/ml   Comprehensive metabolic panel    Collection Time: 05/01/18  5:57 PM   Result Value Ref Range    Sodium 140 136 - 145 mmol/L    Potassium 4 0 3 5 - 5 3 mmol/L    Chloride 106 100 - 108 mmol/L    CO2 26 21 - 32 mmol/L    Anion Gap 8 4 - 13 mmol/L    BUN 12 5 - 25 mg/dL    Creatinine 0 85 0 60 - 1 30 mg/dL    Glucose 315 (H) 65 - 140 mg/dL    Calcium 7 0 (L) 8 3 - 10 1 mg/dL    AST 44 5 - 45 U/L    ALT 23 12 - 78 U/L    Alkaline Phosphatase 95 46 - 116 U/L    Total Protein 6 8 6 4 - 8 2 g/dL    Albumin 2 7 (L) 3 5 - 5 0 g/dL    Total Bilirubin 0 79 0 20 - 1 00 mg/dL    eGFR 65 ml/min/1 73sq m   Protime-INR    Collection Time: 05/01/18  5:57 PM   Result Value Ref Range    Protime 20 6 (H) 12 1 - 14 4 seconds    INR 1 75 (H) 0 86 - 1 16   Fingerstick Glucose (POCT)    Collection Time: 05/01/18  6:16 PM   Result Value Ref Range    POC Glucose 322 (H) 65 - 140 mg/dl   Fingerstick Glucose (POCT)    Collection Time: 05/01/18 11:27 PM   Result Value Ref Range    POC Glucose 254 (H) 65 - 140 mg/dl   Basic metabolic panel    Collection Time: 05/02/18  4:52 AM   Result Value Ref Range    Sodium 143 136 - 145 mmol/L    Potassium 3 3 (L) 3 5 - 5 3 mmol/L    Chloride 107 100 - 108 mmol/L    CO2 28 21 - 32 mmol/L    Anion Gap 8 4 - 13 mmol/L    BUN 11 5 - 25 mg/dL    Creatinine 0 62 0 60 - 1 30 mg/dL    Glucose 125 65 - 140 mg/dL    Calcium 7 1 (L) 8 3 - 10 1 mg/dL    eGFR 85 ml/min/1 73sq m   CBC and differential    Collection Time: 05/02/18  4:52 AM   Result Value Ref Range    WBC 8 28 4 31 - 10 16 Thousand/uL    RBC 3 95 3 81 - 5 12 Million/uL    Hemoglobin 7 9 (L) 11 5 - 15 4 g/dL    Hematocrit 26 9 (L) 34 8 - 46 1 %    MCV 68 (L) 82 - 98 fL    MCH 20 0 (L) 26 8 - 34 3 pg    MCHC 29 4 (L) 31 4 - 37 4 g/dL    RDW 19 0 (H) 11 6 - 15 1 %    MPV 9 6 8 9 - 12 7 fL    Platelets 001 436 - 302 Thousands/uL    nRBC 0 /100 WBCs    Neutrophils Relative 70 43 - 75 %    Lymphocytes Relative 21 14 - 44 %    Monocytes Relative 8 4 - 12 %    Eosinophils Relative 1 0 - 6 %    Basophils Relative 0 0 - 1 %    Neutrophils Absolute 5 74 1 85 - 7 62 Thousands/µL    Lymphocytes Absolute 1 72 0 60 - 4 47 Thousands/µL    Monocytes Absolute 0 67 0 17 - 1 22 Thousand/µL    Eosinophils Absolute 0 11 0 00 - 0 61 Thousand/µL    Basophils Absolute 0 01 0 00 - 0 10 Thousands/µL   Lactic acid, plasma    Collection Time: 05/02/18  4:52 AM   Result Value Ref Range    LACTIC ACID 1 5 0 5 - 2 0 mmol/L   Fingerstick Glucose (POCT)    Collection Time: 05/02/18  6:29 AM   Result Value Ref Range    POC Glucose 139 65 - 140 mg/dl     VTE Prophylaxis: Xarelto    Counseling / Coordination of Care  Total Critical Care time spent 20 minutes excluding procedures, teaching and family updates

## 2018-05-02 NOTE — PLAN OF CARE
INFECTION - ADULT     Absence of fever/infection during neutropenic period Completed          INFECTION - ADULT     Absence or prevention of progression during hospitalization Not Progressing        Nutrition/Hydration-ADULT     Nutrient/Hydration intake appropriate for improving, restoring or maintaining nutritional needs Not Progressing        Prexisting or High Potential for Compromised Skin Integrity     Skin integrity is maintained or improved Not Progressing          DISCHARGE PLANNING     Discharge to home or other facility with appropriate resources Progressing        DISCHARGE PLANNING - CARE MANAGEMENT     Discharge to post-acute care or home with appropriate resources Progressing        Knowledge Deficit     Patient/family/caregiver demonstrates understanding of disease process, treatment plan, medications, and discharge instructions Progressing        PAIN - ADULT     Verbalizes/displays adequate comfort level or baseline comfort level Progressing        Potential for Falls     Patient will remain free of falls Progressing        SAFETY ADULT     Maintain or return to baseline ADL function Progressing     Maintain or return mobility status to optimal level Progressing

## 2018-05-02 NOTE — CONSULTS
Consultation - Cardiology   Christophe Boys [de-identified] y o  female MRN: [id number]  Unit/Bed#: MICU 02 Encounter: [id number]  05/02/18  8:34 AM      Assessment:  Active Problems:    Essential hypertension    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 native heart with angina pectoris ([facility] [address] )    Type 2 diabetes mellitus with complication, with long-term current use of insulin (HCC)    Atrial flutter (HCC)    Hyperlipidemia    Gastroesophageal reflux disease without esophagitis    Moderate mitral stenosis    Acute respiratory failure with hypoxia (HCC)    Asthma    Asymptomatic carotid artery stenosis, bilateral    Atherosclerosis of artery of extremity with ulceration (HCC)    Hx of CABG    Chronic anticoagulation    Postoperative hypothyroidism    Paroxysmal atrial fibrillation (HCC)    Hypokalemia    Hypocalcemia    Left bundle branch block (LBBB)    Expressive aphasia    Multiple lacunar infarcts (HCC)    Vomiting    History of CVA (cerebrovascular accident)    Seizure ([facility] )    Supratherapeutic INR    PVC (premature ventricular contraction)    Pulmonary hypertension (HCC)    Anemia    Severe sepsis (HCC)    Lactic acidosis      Plan:    1  NSVT  Likely due to cardiomyopathy with EF 35% by echo and electrolyte imbalances  Replete electrolytes as able, continue beta blocker  Will aggressively replete potassium, as it is low today  Was on Metoprolol tartrate 100 bid prior to admission, titrate up here back to home dosing as able  2  Chronic combined systolic/diastolic HF with EF 49% by echo 5/18  Echo 4/18 had shown EF 50% after TAVR, but prior to TAVR EF was also 35% by echo 3/18  Cardiac catheterization 3/18 showed patent LIMA to LAD, SVG to D1 with 50% stenosis, Y graft to D2,and D3, SVG to OM1 was 100% stenosed, SVG to RPDA was 100% stenosed  Likely LV function declines due to underlying CAD in setting of strain from other comorbidities/acute illnesses   Continue medical management for now, repeat echo if/when patient recovers from acute illness, as outpt  On beta blocker, was on Lisinopril 2 5 mg daily at home, as well as KCL 10 bid, and Lasix 40 bid  Here appears volume overloaded by CXR, versus aspiration PNA  Being treated with cefepime, Flagyl, and Vanco  Will also treat with standing IV Lasix to help improve respiratory status and help wean O2 as able  3  Paroxysmal atrial fibrillation  Continue home Metoprolol and Xarelto 20      4  Severe AS s/p TAVR  On aspirin, statin  5  CAD  Continue aspirin, statin, beta blocker  Troponins not elevated, no current CP  6  Hypokalemia  Will start standing KCL 40 bid, give one 20 IV  Recheck K level in evening, increase potassium repletion if needed  Follows with Dr Iqra Lyles as outpt  History of Present Illness   Physician Requesting Consult: Shari Mayo MD  Reason for Consult / Principal Problem: NSVT  HPI: Curtis Mtaos is a [de-identified] y o  female with history of coronary artery disease status post CABG, paroxysmal atrial fibrillation, severe aortic stenosis status post transcatheter aortic valve replacement 6/11, chronic systolic heart failure with an ejection fraction of 35% by echo in May 2018, prior stroke which was felt to be embolic and for which she was switched from Coumadin to Xarelto, P 80, diabetes, who is admitted with altered mental status, weakness, vomiting, and loose stools  Shortly after admission she was noted to have a possible seizure for which a rapid response was called  She continued to be encephalopathic after the episode requiring intubation for airway protection on 4/29/18  She was loaded with Keppra  CT scan was negative  She was able to be successfully extubated on 04/30/2018    On 05/01 she was noted to have an episode of possible 25 beat run of nonsustained ventricular tachycardia on telemetry  Cardiology was consulted regarding this  She has had issues with persistently low calcium and magnesium    Overnight [id number] she was noted to have hypoxia and increased work of breathing  She also developed a fever to 101 3 with concern for aspiration pneumonia  She was started on BiPAP and transferred back to the intensive care unit  She currently denies any CP or SOB, although she is on approximately 4-5 L O2  No significant LE edema  No further fevers  Inpatient consult to Cardiology  Consult performed by: Haley Hernández  Consult ordered by: Mikayla Caruso          Review of Systems:    Review of Systems   Constitution: Positive for malaise/fatigue  Negative for chills, decreased appetite, diaphoresis, fever, weakness, night sweats, weight gain and weight loss  HENT: Negative for ear pain, hearing loss, hoarse voice, nosebleeds, sore throat and tinnitus  Eyes: Negative for blurred vision and pain  Cardiovascular: Negative  Negative for chest pain, claudication, cyanosis, dyspnea on exertion, irregular heartbeat, leg swelling, near-syncope, orthopnea, palpitations, paroxysmal nocturnal dyspnea and syncope  Respiratory: Negative for cough, hemoptysis, shortness of breath, sleep disturbances due to breathing, snoring, sputum production and wheezing  Hematologic/Lymphatic: Negative for adenopathy and bleeding problem  Does not bruise/bleed easily  Skin: Negative for color change, dry skin, flushing, itching, poor wound healing and rash  Musculoskeletal: Negative for arthritis, back pain, falls, joint pain, muscle cramps, muscle weakness, myalgias and neck pain  Gastrointestinal: Negative for abdominal pain, constipation, diarrhea, dysphagia, heartburn, hematemesis, hematochezia, melena, nausea and vomiting  Genitourinary: Negative for dysuria, frequency, hematuria, hesitancy, non-menstrual bleeding and urgency  Neurological: Negative for excessive daytime sleepiness, dizziness, focal weakness, headaches, light-headedness, loss of balance, numbness, paresthesias, tremors and vertigo  Psychiatric/Behavioral: Positive for altered mental status  Negative for depression and memory loss  The patient does not have insomnia and is not nervous/anxious  Allergic/Immunologic: Negative for environmental allergies and persistent infections         Historical Information   Past Medical History:   Diagnosis Date    Altered mental status     Anticoagulated     Coumadin for Aflutter    Asthma     Atrial flutter (HCC)     maintained on coumadin anticoag    CAD (coronary artery disease)     Candidiasis     Carotid stenosis, bilateral     Cataract     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) ([facility])     Chronic fatigue syndrome     Chronic low back pain     Concussion w loss of consciousness of unsp duration, init     Coronary artery disease     CVA (cerebral vascular accident) ([facility] [address] ) 4/17/2018    Diabetes mellitus ([facility] )     type 2, insulin dependent    DJD (degenerative joint disease)     GERD (gastroesophageal reflux disease)     Herpes zoster     HLD (hyperlipidemia)     HTN (hypertension)     Hyperlipidemia     Hypothyroidism     Irritable bowel syndrome     Lumbar radiculopathy     Lyme disease     Dx in hospital 8/2011    Lyme disease     MI (myocardial infarction) ([facility] [address] )     Migraines     Muscle spasm     Non-neoplastic nevus     Nontoxic multinodular goiter     OA (osteoarthritis)     Osteoarthritis     Other chronic pain     PAD (peripheral artery disease) ([facility])     Palpitations     Pseudogout     Spinal stenosis     Transient cerebral ischemia     Trigger ring finger     Viral gastroenteritis      Past Surgical History:   Procedure Laterality Date    APPENDECTOMY      ARTERIOGRAM  12/19/2017    CARDIAC CATHETERIZATION      CHOLECYSTECTOMY      COLONOSCOPY      CORONARY ARTERY BYPASS GRAFT  2004    LUMBAR LAMINECTOMY      IL ECHO TRANSESOPHAG R-T 2D W/PRB IMG ANGELO I&R N/A 4/3/2018    Procedure: TRANSESOPHAGEAL ECHOCARDIOGRAM (ROBB);  Surgeon: Beverly Sosa DO;  Location: [facility];  Service: Cardiac Surgery    [place] REPLACE AORTIC VALVE OPENFEMORAL ARTERY APPROACH N/A 4/3/2018    Procedure: REPLACEMENT AORTIC VALVE TRANSCATHETER (TAVR) TRANSFEMORAL w/ 26MM IVY YEVGENIY S3 VALVE;  Surgeon: Beverly Sosa DO;  Location: [facility];  Service: Cardiac Surgery    THYROIDECTOMY      TOTAL ABDOMINAL HYSTERECTOMY W/ BILATERAL SALPINGOOPHORECTOMY       History   Alcohol Use No     History   Drug Use No     History   Smoking Status    Never Smoker   Smokeless Tobacco    Never Used       Family History:   Family History   Problem Relation Age of Onset    Cancer Mother     Stroke Mother     Cancer Father     Heart disease Father     Breast cancer Sister     Diabetes Daughter      Passed away January 2017        Meds/Allergies   PTA meds:   Prior to Admission Medications   Prescriptions Last Dose Informant Patient Reported? Taking? LANTUS 100 UNIT/ML subcutaneous injection 4/27/2018 at Unknown time  No Yes   Sig: Inject 20 Units under the skin 2 (two) times a day   Patient taking differently: Inject 30 Units under the skin 2 (two) times a day     Mometasone Furoate [facility] HFA) 100 MCG/ACT AERO 4/28/2018 at Unknown time Self No Yes   Sig: Inhale 2 puffs once daily   acetaminophen (TYLENOL) 650 mg CR tablet Unknown at Unknown time  No No   Sig: Take 1 tablet (650 mg total) by mouth every 8 (eight) hours as needed for mild pain Do not take in conjunction with Percocet     albuterol (VENTOLIN HFA) 90 mcg/act inhaler Unknown at Unknown time Self Yes No   Sig: Inhale 108 mcg 2 (two) times a day as needed 2 puffs bid PRN   aspirin 81 MG tablet 4/28/2018 at Unknown time Self Yes Yes   Sig: Take 81 mg by mouth daily   atorvastatin (LIPITOR) 80 mg tablet 4/28/2018 at Unknown time  No Yes   Sig: Take 1 tablet (80 mg total) by mouth daily   calcium carbonate (TUMS) 500 mg chewable tablet 4/28/2018 at Unknown time Self Yes Yes   Sig: Chew 2 tablets 3 (three) times a day     cholecalciferol (VITAMIN D3) 1,000 units tablet 2018 at Unknown time Self Yes Yes   Sig: Take 1,000 Units by mouth daily     dextromethorphan-guaiFENesin (ROBITUSSIN DM)  mg/5 mL syrup Unknown at Unknown time  Yes No   Sig: Take 10 mL by mouth every 4 (four) hours as needed for cough   diphenhydrAMINE (BANOPHEN) 50 mg capsule 2018 at Unknown time  Yes Yes   Sig: Take 50 mg by mouth daily   docusate sodium (COLACE) 100 mg capsule 2018 at Unknown time  No Yes   Sig: Take 1 capsule (100 mg total) by mouth 2 (two) times a day   esomeprazole (NexIUM) 20 mg capsule 2018 at Unknown time Self Yes Yes   Sig: Take 20 mg by mouth daily in the early morning     fluticasone (FLONASE) 50 mcg/act nasal spray 2018 at Unknown time  Yes Yes   Si spray into each nostril daily   furosemide (LASIX) 40 mg tablet 2018 at Unknown time  Yes Yes   Sig: Take 40 mg by mouth 2 (two) times a day   glucose blood test strip 2018 at Unknown time  Yes Yes   Si each by Other route 4 (four) times a day (before meals and at bedtime) Use as instructed   levothyroxine 100 mcg tablet 2018 at Unknown time Self Yes Yes   Sig: Take 100 mcg by mouth daily   lisinopril (ZESTRIL) 5 mg tablet 2018 at Unknown time Self Yes Yes   Sig: Take 2 5 mg by mouth daily at bedtime     loratadine (CLARITIN) 10 mg tablet 2018 at Unknown time Self No Yes   Sig: Take 1 tablet (10 mg total) by mouth daily   metFORMIN (GLUCOPHAGE) 1000 MG tablet 2018 at Unknown time Self Yes Yes   Si,000 mg 2 (two) times a day   metoprolol tartrate (LOPRESSOR) 100 mg tablet 2018 at Unknown time  No Yes   Sig: Take 1 tablet (100 mg total) by mouth every 12 (twelve) hours   nitroglycerin (NITROSTAT) 0 4 mg SL tablet 2018 at Unknown time Self Yes Yes   Sig: Place 0 4 mg under the tongue every 3 (three) minutes as needed   pantoprazole (PROTONIX) 40 mg tablet 2018 at Unknown time Yes Yes   Sig: Take 40 mg by mouth daily   polyethylene glycol (MIRALAX) 17 g packet 4/28/2018 at Unknown time  Yes Yes   Sig: Take 17 g by mouth daily   potassium chloride (K-DUR,KLOR-CON) 10 mEq tablet 4/28/2018 at Unknown time  Yes Yes   Sig: Take 10 mEq by mouth 2 (two) times a day   rivaroxaban (XARELTO) 20 mg tablet 4/28/2018 at Unknown time  No Yes   Sig: Take 1 tablet (20 mg total) by mouth daily      Facility-Administered Medications: None     Allergies   Allergen Reactions    Other Chest Pain     IVP-listed as "chest pain" in previous chart, patient stated this occurred "a long time ago" but does not remember exactly occurred     Cephalosporins Chest Pain    Contrast [Iodinated Diagnostic Agents] Other (See Comments)     Flash pulm edema    Doxycycline Chest Pain    Levaquin [Levofloxacin] Chest Pain    Ondansetron Chest Pain     Prolonged QT    Toradol [Ketorolac Tromethamine] Chest Pain       Objective   Vitals: Blood pressure 133/75, pulse 74, temperature 98 9 °F (37 2 °C), temperature source Oral, resp  rate (!) 26, height 5' 4" (1 626 m), weight 75 7 kg (166 lb 14 2 oz), SpO2 96 %, not currently breastfeeding , Body mass index is 28 65 kg/m² , Orthostatic Blood Pressures      Most Recent Value   Blood Pressure  133/75 filed at 05/02/2018 0500   Patient Position - Orthostatic VS  Lying filed at 05/02/2018 5734          Systolic (05YWO), DRJ:432 , Min:95 , TLO:718     Diastolic (89SQO), ILM:34, Min:54, Max:122        Intake/Output Summary (Last 24 hours) at 05/02/18 0834  Last data filed at 05/02/18 0700   Gross per 24 hour   Intake             1230 ml   Output             2480 ml   Net            -1250 ml       Invasive Devices     Peripheral Intravenous Line            Peripheral IV 04/29/18 Right Arm 3 days    Peripheral IV 05/01/18 Left Antecubital 1 day                    Physical Exam:  GEN: Awake and alert, in no acute distress      HEENT: Sclera anicteric, conjunctivae pink, mucous membranes moist   NECK: Supple, no carotid bruits, no significant JVD  HEART: Regular rhythm, II/VI systolic murmur, no clicks, gallops or rubs  LUNGS: decreased at bases bilaterally; scattered wheezes, no rales, or rhonchi   ABDOMEN: Soft, nontender, nondistended, normoactive bowel sounds  EXTREMITIES: Skin warm and well perfused, no clubbing, cyanosis, or edema  NEURO: No focal findings  SKIN: Normal without suspicious lesions on exposed skin  Lab Results:     Troponins:   Results from last 7 days  Lab Units 05/01/18  1621 04/28/18  0913   TROPONIN I ng/mL 0 03 0 03       CBC with diff:   Results from last 7 days  Lab Units 05/02/18  0452 05/01/18  1621 05/01/18  0533 05/01/18  0119 04/30/18  1156 04/30/18  0540 04/29/18  1028  04/28/18  2140  04/28/18  0913   WBC Thousand/uL 8 28 14 78* 7 35  --   --  8 24  8 24 15 93*  --  9 60  --  9 30   HEMOGLOBIN g/dL 7 9* 9 0* 8 4* 8 3* 8 1* 7 5*  7 5* 9 9*  --  9 2*  --  9 5*   I STAT HEMOGLOBIN   --   --   --   --   --   --   --   < >  --   < >  --    HEMATOCRIT % 26 9* 29 9* 28 4*  --  26 1* 24 7*  24 7* 33 4*  --  30 1*  --  30 7*   MCV fL 68* 69* 70*  --   --  68*  68* 70*  --  68*  --  67*   PLATELETS Thousands/uL 218 318 239  --   --  210  210 359  --  309  --  315   MCH pg 20 0* 20 8* 20 6*  --   --  20 6*  20 6* 20 8*  --  20 9*  --  20 8*   MCHC g/dL 29 4* 30 1* 29 6*  --   --  30 4*  30 4* 29 6*  --  30 6*  --  30 9*   RDW % 19 0* 19 1* 19 2*  --   --  19 1*  19 1* 19 0*  --  18 7*  --  18 8*   MPV fL 9 6 10 1 9 9  --   --  9 6  9 6 10 8  --  10 1  --  10 7   NRBC AUTO /100 WBCs 0  --   --   --   --  0  --   --  0  --  0   < > = values in this interval not displayed        CMP:   Results from last 7 days  Lab Units 05/02/18  0452 05/01/18  1757 05/01/18  1621 05/01/18  0993 04/30/18  0540 04/29/18  2039 04/29/18  1538 04/29/18  1029  04/29/18  0450 04/28/18  2140  04/28/18  0913   SODIUM mmol/L 143 140 140 144 142 143 143 141  --  139 140  -- 141   POTASSIUM mmol/L 3 3* 4 0 4 3 4 1 3 2* 3 8 3 5 3 7  --  3 5 3 6  --  3 1*   CHLORIDE mmol/L 107 106 108 111* 109* 109* 110* 106  --  108 107  --  107   CO2 mmol/L 28 26 22 26 24 24 23 11*  --  15* 21  --  21   ANION GAP mmol/L 8 8 10 7 9 10 10 24*  --  16* 12  --  13   BUN mg/dL 11 12 11 13 17 15 15 14  --  12 11  --  9   CREATININE mg/dL 0 62 0 85 0 86 0 66 0 98 0 94 0 73 1 13  --  0 82 0 72  --  0 70   GLUCOSE RANDOM mg/dL 125 315* 339* 127 153* 124 84 204*  --  132 122  --  107   GLUCOSE, ISTAT   --   --   --   --   --   --   --   --   < >  --   --   < >  --    CALCIUM mg/dL 7 1* 7 0* 7 2* 6 7* 6 0* 6 2* 5 9* 6 3*  --  5 9* 6 0*  --  6 2*   AST U/L  --  44  --   --   --   --   --  43  --  30 28  --  33   ALT U/L  --  23  --   --   --   --   --  21  --  19 19  --  19   ALK PHOS U/L  --  95  --   --   --   --   --  73  --  70 72  --  74   TOTAL PROTEIN g/dL  --  6 8  --   --   --   --   --  7 5  --  7 3 7 4  --  7 6   BILIRUBIN TOTAL mg/dL  --  0 79  --   --   --   --   --  0 83  --  0 81 0 83  --  0 67   EGFR ml/min/1 73sq m 85 65 64 84 55 57 78 46  --  68 79  < > 82   < > = values in this interval not displayed      Magnesium:   Results from last 7 days  Lab Units 05/01/18  1621 05/01/18  1026 05/01/18  0513 04/30/18  0540 04/29/18  2212 04/29/18  2039 04/29/18  1539   MAGNESIUM mg/dL 2 2 1 8 1 7  1 6 2 0 1 6 1 8 0 5*       Coags:   Results from last 7 days  Lab Units 05/01/18  1757 05/01/18  0542 04/30/18  0540 04/29/18  1538 04/29/18  1028 04/29/18  0120 04/28/18  2140 04/28/18  0913   PTT seconds  --   --  47* 53* 62*  --  51* 50*   INR  1 75* 1 61* 3 23* 6 94* 10 50* 3 75* 4 21* 4 04*       TSH:       Lipid Profile:       Hgb A1c:       Cardiac testing:   Results for orders placed during the hospital encounter of 04/28/18   Echo complete with contrast if indicated    Narrative [address]  [address]  [phone]    Transthoracic Echocardiogram  2D, M-mode, Doppler, and Color Doppler    Study date:  01-May-2018    Patient: Gladis Pina  MR number: [id number]  Account number: [de-identified]  : 1940  Age: [de-identified] years  Gender: Female  Status: Inpatient  Location: Bedside  Height: 64 in  Weight: 161 7 lb  BP: 171/ 83 mmHg    Indications: Aortic Valve Disorder Evaluate prosthetic aortic valve  Evaluate prosthetic mitral valve  Diagnoses: I35 9 - Nonrheumatic aortic valve disorder, unspecified    Sonographer:  Yonathan Kearns RDCS  Primary Physician:  Tamar Jain MD  Group:  [id number]  Interpreting Physician:  Lino Monroy MD    SUMMARY    LEFT VENTRICLE:  The ventricle was mildly dilated  Systolic function was moderately to markedly reduced  Ejection fraction was estimated to be 35 %  There was moderate diffuse hypokinesis with distinct regional wall motion abnormalities  More severe hypokinesis to akinesis was seen in the inferoseptal and inferior walls  Wall thickness was mildly increased  VENTRICULAR SEPTUM:  There was moderate dyssynergic motion  These changes are consistent with LBBB  RIGHT VENTRICLE:  The ventricle was moderately dilated  Systolic function was mildly reduced  LEFT ATRIUM:  The atrium was moderately dilated  RIGHT ATRIUM:  The atrium was moderately dilated  MITRAL VALVE:  There was marked annular calcification  There was moderate diffuse thickening  There was moderately reduced leaflet separation  Transmitral velocity was increased due to valvular stenosis  There was moderate stenosis  There was mild regurgitation  AORTIC VALVE:  A bioprosthesis was present  It exhibited normal function  There was no significant perivalvular regurgitation  Valve mean gradient was 8 mmHg  TRICUSPID VALVE:  There was moderate regurgitation  Pulmonary artery systolic pressure was markedly increased  Estimated peak PA pressure was 70 mmHg      IVC, HEPATIC VEINS:  The inferior vena cava was dilated  HISTORY: PRIOR HISTORY: TAVR, Mitral Stenosis, CABG, MI, HTN, Asthma, CHF, DM, CVA, CAD, HLD    PROCEDURE: The procedure was performed at the bedside  This was a routine study  The transthoracic approach was used  The study included complete 2D imaging, M-mode, complete spectral Doppler, and color Doppler  The heart rate was 100 bpm,  at the start of the study  Images were obtained from the parasternal, apical, subcostal, and suprasternal notch acoustic windows  Echocardiographic views were limited due to restricted patient mobility, poor patient compliance, and lung  interference  This was a technically difficult study  LEFT VENTRICLE: The ventricle was mildly dilated  Systolic function was moderately to markedly reduced  Ejection fraction was estimated to be 35 %  There was moderate diffuse hypokinesis with distinct regional wall motion abnormalities  More severe hypokinesis to akinesis was seen in the inferoseptal and inferior walls  Wall thickness was mildly increased  DOPPLER: The study was not technically sufficient to allow evaluation of LV diastolic function  VENTRICULAR SEPTUM: Thickness was mildly increased  There was moderate dyssynergic motion  These changes are consistent with LBBB  RIGHT VENTRICLE: The ventricle was moderately dilated  Systolic function was mildly reduced  Wall thickness was normal     LEFT ATRIUM: The atrium was moderately dilated  RIGHT ATRIUM: The atrium was moderately dilated  MITRAL VALVE: There was marked annular calcification  There was moderate diffuse thickening  There was moderately reduced leaflet separation  DOPPLER: Transmitral velocity was increased due to valvular stenosis  There was moderate  stenosis  There was mild regurgitation  AORTIC VALVE: A bioprosthesis was present  It exhibited normal function  DOPPLER: Transaortic velocity was within the normal range  There was no evidence for stenosis   There was no significant regurgitation  There was no significant  perivalvular regurgitation  TRICUSPID VALVE: The valve structure was normal  DOPPLER: The transtricuspid velocity was within the normal range  There was moderate regurgitation  Pulmonary artery systolic pressure was markedly increased  Estimated peak PA pressure was  70 mmHg  PULMONIC VALVE: Not well visualized  DOPPLER: The transpulmonic velocity was within the normal range  There was no regurgitation  PERICARDIUM: There was no pericardial effusion  AORTA: The root exhibited normal size  The ascending aorta was normal in size  SYSTEMIC VEINS: IVC: The inferior vena cava was dilated  PULMONARY VEINS: DOPPLER: Doppler signals were not recordable in the pulmonary vein(s)  MEASUREMENT TABLES    DOPPLER MEASUREMENTS  Aortic valve   (Reference normals)  Mean gradient   8 mmHg   (--)    SYSTEM MEASUREMENT TABLES    2D  %FS: 15 07 %  Ao Diam: 2 75 cm  EDV(Teich): 139 25 ml  EF(Teich): 31 62 %  ESV(Teich): 95 22 ml  IVSd: 1 2 cm  LA Area: 23 57 cm2  LA Diam: 4 16 cm  LVEDV MOD A4C: 93 35 ml  LVEF MOD A4C: 41 28 %  LVESV MOD A4C: 54 81 ml  LVIDd: 5 37 cm  LVIDs: 4 56 cm  LVLd A4C: 7 64 cm  LVLs A4C: 6 64 cm  LVOT Diam: 1 9 cm  LVPWd: 1 21 cm  RA Area: 25 62 cm2  RVIDd: 5 25 cm  SV MOD A4C: 38 53 ml  SV(Teich): 44 03 ml    CW  AV Env  Ti: 318 34 ms  AV VTI: 47 91 cm  AV Vmax: 2 36 m/s  AV Vmean: 1 5 m/s  AV maxP 29 mmHg  AV meanPG: 10 48 mmHg  TR Vmax: 3 84 m/s  TR maxP 13 mmHg    MM  TAPSE: 1 4 cm    PW  CHER (VTI): 1 05 cm2  CHER Vmax: 1 03 cm2  E': 0 03 m/s  E/E': 81 41  HR: 173 4 BPM  LVOT Env  Ti: 332 18 ms  LVOT VTI: 17 78 cm  LVOT Vmax: 0 86 m/s  LVOT Vmean: 0 54 m/s  LVOT maxP 95 mmHg  LVOT meanP 37 mmHg  LVSI Dopp: 28 05 ml/m2  LVSV Dopp: 50 2 ml  MV A Jayant: 2 32 m/s  MV Dec Tama: 11 07 m/s2  MV DecT: 201 33 ms  MV E Jayant: 2 23 m/s  MV E/A Ratio: 0 96  MV PHT: 58 39 ms  MV VTI: 50 85 cm  MV Vmax: 2 29 m/s  MV Vmean: 1 49 m/s  MV maxP 89 mmHg  MV meanPG: 10 01 mmHg  MVA (VTI): 0 99 cm2  MVA By PHT: 3 77 cm2    [facility] Accredited Echocardiography Laboratory    Prepared and electronically signed by    Harvey Brown MD  Signed [id number] 15:08:38       Results for orders placed during the hospital encounter of 18   ROBB    Narrative [address]  [place], [address] [facility]  [phone]    Transesophageal Echocardiogram  2D, Doppler, and Color Doppler    Study date:  2018    Patient: Rea Jaffe  MR number: [id number]  Account number: [de-identified]  : 1940  Age: [de-identified] years  Gender: Female  Status: Inpatient  Location: Echo lab  Height: 64 in  Weight: 167 lb  BP: 116/ 52 mmHg    Indications: CVA    Diagnoses: R55  - Syncope and collapse    Sonographer:  SIOBHAN Corbett  Primary Physician:  Mohan Ron MD  Referring Physician:  Sylvester Nina MD  Group:  [facility] [id number] [facility]  Interpreting Physician:  Inge Ball MD    SUMMARY    LEFT VENTRICLE:  Systolic function was normal  Ejection fraction was estimated to be 55 %  Although no diagnostic regional wall motion abnormality was identified, this possibility cannot be completely excluded on the basis of this study  Wall thickness was increased  Concentric hypertrophy was present  LEFT ATRIAL APPENDAGE:  No thrombus was identified  ATRIAL SEPTUM:  The septum bows from left to right, consistent with increased left atrial pressure  No defect or patent foramen ovale was identified by color Doppler or after injection of agitated saline  MITRAL VALVE:  There was moderate annular calcification  There was mild to moderate stenosis  Vmax 1 7 m/s, mean gradient 5 6 mm Hg, maximum gradient 11 mm Hg at a HR of 78 BPM   There was mild regurgitation  AORTIC VALVE:  A bioprosthesis was present  It exhibited normal function      TRICUSPID VALVE:  There was moderate to severe regurgitation  Estimated peak PA pressure was 55 mmHg  The findings suggest moderate pulmonary hypertension  HISTORY: PRIOR HISTORY: TAVR, Mitral Stenosis , CABG, MI, Hypertension, Asthma, CHF    PROCEDURE: The procedure was performed in the echo lab  This was a routine study  The risks and alternatives of the procedure were explained to the patient and informed consent was obtained  The transesophageal approach was used  The study  included complete 2D imaging, limited spectral Doppler, and color Doppler  The heart rate was 74 bpm, at the start of the study  An adult omniplane probe was inserted by the attending cardiologist  Intubated with ease  One intubation  attempt(s)  There was no blood detected on the probe  Image quality was adequate  There were no complications during the procedure  MEDICATIONS: Anesthesia administered by anesthesia team     LEFT VENTRICLE: Size was normal  Systolic function was normal  Ejection fraction was estimated to be 55 %  Although no diagnostic regional wall motion abnormality was identified, this possibility cannot be completely excluded on the basis  of this study  Wall thickness was increased  Concentric hypertrophy was present  RIGHT VENTRICLE: The size was normal  Systolic function was normal     LEFT ATRIUM: The atrium was dilated  No thrombus was identified  APPENDAGE: The appendage was dilated  No thrombus was identified  DOPPLER: The function was reduced (reduced emptying velocity)  ATRIAL SEPTUM: The septum bows from left to right, consistent with increased left atrial pressure  No defect or patent foramen ovale was identified by color Doppler or after injection of agitated saline  MITRAL VALVE: There was moderate annular calcification  There was mild-moderate calcification  There was mildly reduced leaflet separation  DOPPLER: There was mild to moderate stenosis   Vmax 1 7 m/s, mean gradient 5 6 mm Hg, maximum  gradient 11 mm Hg at a HR of 78 BPM  There was mild regurgitation  AORTIC VALVE: A bioprosthesis was present  It exhibited normal function  DOPPLER: There was no evidence for stenosis  There was no significant regurgitation  TRICUSPID VALVE: The valve structure was normal  There was normal leaflet separation  There was no echocardiographic evidence of vegetation  DOPPLER: There was moderate to severe regurgitation  Estimated peak PA pressure was 55 mmHg  The  findings suggest moderate pulmonary hypertension  PULMONIC VALVE: Leaflets exhibited normal thickness, no calcification, and normal cuspal separation  There was no echocardiographic evidence of vegetation  PERICARDIUM: There was no pericardial effusion  The pericardium was normal in appearance  AORTA: The root exhibited normal size  There was no atheroma  There was no evidence for dissection  There was no evidence for aneurysm  [address] Accredited Echocardiography [facility]    Prepared and electronically signed by    Lily Bello MD  Signed 18-Apr-2018 14:48:57               Imaging:       Result Date: 5/1/2018  Narrative: CHEST INDICATION:     COMPARISON:  AP chest 4/29/2018  EXAM PERFORMED/VIEWS:  XR CHEST PORTABLE FINDINGS: Stable cardiac and mediastinal contours  Median sternotomy wires  Aortic valve replacement  Extensive bilateral hazy airspace disease throughout both lungs with a small left basilar effusion may indicate CHF  No pneumothorax  Osseous structures appear within normal limits for patient age  Impression: Extensive bilateral hazy airspace disease throughout both lungs with a small left basilar effusion may indicate CHF  Multilobar pneumonia is less likely part of the differential diagnosis  Workstation performed: [id number]     Xr Chest Portable    Result Date: 4/30/2018  Narrative: CHEST INDICATION:   line placement   COMPARISON:  April 29, 2018 EXAM PERFORMED/VIEWS:  XR CHEST PORTABLE FINDINGS:  Endotracheal tube in place with tip 2 5 cm above the dwayne  Nasogastric tube enters the stomach  Tip of right IJ catheter in expected location of SVC  Heart enlarged  Prior open heart surgery  Prosthetic aortic valve  Pulmonary vessels unremarkable  Atelectasis or consolidation in the left lower lobe  Small left pleural effusion  Lungs and pleural spaces otherwise clear  No pneumothorax  Osseous structures appear within normal limits for patient age  Impression: 1  Tip of right IJ catheter in superior vena cava  2   No evidence of pneumothorax  3   Small left pleural effusion and atelectasis or consolidation in the base of the left lower lobe  Workstation performed: [id number]       EKG: Sinus tachycardia with PVCs, LAD, LBBB  Personally reviewed

## 2018-05-02 NOTE — SOCIAL WORK
CM met pt at bedside to discuss d/c plan  CM informed pt that PT and OT's recommendation is in pt rehab  Pt requested for CM to send referral to  [facility], referral in [facility]  CM called and informed pt's son Fei Shipley per pt's request of the said referral  CM gave pt a STR referral list for additional SNF referrals  CM will follow

## 2018-05-03 ENCOUNTER — APPOINTMENT (INPATIENT)
Dept: RADIOLOGY | Facility: HOSPITAL | Age: 78
DRG: 056 | End: 2018-05-03
Payer: MEDICARE

## 2018-05-03 ENCOUNTER — DOCUMENTATION (OUTPATIENT)
Dept: CARDIOLOGY CLINIC | Facility: CLINIC | Age: 78
End: 2018-05-03

## 2018-05-03 LAB
ANION GAP SERPL CALCULATED.3IONS-SCNC: 6 MMOL/L (ref 4–13)
BUN SERPL-MCNC: 11 MG/DL (ref 5–25)
CALCIUM SERPL-MCNC: 7.4 MG/DL (ref 8.3–10.1)
CHLORIDE SERPL-SCNC: 106 MMOL/L (ref 100–108)
CO2 SERPL-SCNC: 30 MMOL/L (ref 21–32)
CREAT SERPL-MCNC: 0.69 MG/DL (ref 0.6–1.3)
GFR SERPL CREATININE-BSD FRML MDRD: 82 ML/MIN/1.73SQ M
GLUCOSE SERPL-MCNC: 167 MG/DL (ref 65–140)
GLUCOSE SERPL-MCNC: 195 MG/DL (ref 65–140)
GLUCOSE SERPL-MCNC: 210 MG/DL (ref 65–140)
GLUCOSE SERPL-MCNC: 266 MG/DL (ref 65–140)
GLUCOSE SERPL-MCNC: 455 MG/DL (ref 65–140)
POTASSIUM SERPL-SCNC: 4 MMOL/L (ref 3.5–5.3)
SODIUM SERPL-SCNC: 142 MMOL/L (ref 136–145)

## 2018-05-03 PROCEDURE — 80048 BASIC METABOLIC PNL TOTAL CA: CPT | Performed by: INTERNAL MEDICINE

## 2018-05-03 PROCEDURE — 99232 SBSQ HOSP IP/OBS MODERATE 35: CPT | Performed by: PSYCHIATRY & NEUROLOGY

## 2018-05-03 PROCEDURE — 99233 SBSQ HOSP IP/OBS HIGH 50: CPT | Performed by: INTERNAL MEDICINE

## 2018-05-03 PROCEDURE — 82948 REAGENT STRIP/BLOOD GLUCOSE: CPT

## 2018-05-03 PROCEDURE — A9585 GADOBUTROL INJECTION: HCPCS | Performed by: INTERNAL MEDICINE

## 2018-05-03 PROCEDURE — 99232 SBSQ HOSP IP/OBS MODERATE 35: CPT | Performed by: INTERNAL MEDICINE

## 2018-05-03 PROCEDURE — 70553 MRI BRAIN STEM W/O & W/DYE: CPT

## 2018-05-03 PROCEDURE — 94760 N-INVAS EAR/PLS OXIMETRY 1: CPT

## 2018-05-03 RX ADMIN — Medication 1 TABLET: at 21:23

## 2018-05-03 RX ADMIN — FUROSEMIDE 40 MG: 10 INJECTION, SOLUTION INTRAMUSCULAR; INTRAVENOUS at 08:54

## 2018-05-03 RX ADMIN — LEVETIRACETAM 750 MG: 750 TABLET ORAL at 08:53

## 2018-05-03 RX ADMIN — GADOBUTROL 10 ML: 604.72 INJECTION INTRAVENOUS at 12:48

## 2018-05-03 RX ADMIN — POTASSIUM CHLORIDE 40 MEQ: 1500 TABLET, EXTENDED RELEASE ORAL at 08:53

## 2018-05-03 RX ADMIN — LEVETIRACETAM 750 MG: 750 TABLET ORAL at 21:25

## 2018-05-03 RX ADMIN — FLUTICASONE PROPIONATE 1 PUFF: 110 AEROSOL, METERED RESPIRATORY (INHALATION) at 08:54

## 2018-05-03 RX ADMIN — INSULIN LISPRO 2 UNITS: 100 INJECTION, SOLUTION INTRAVENOUS; SUBCUTANEOUS at 17:14

## 2018-05-03 RX ADMIN — ATORVASTATIN CALCIUM 80 MG: 80 TABLET, FILM COATED ORAL at 17:15

## 2018-05-03 RX ADMIN — FUROSEMIDE 40 MG: 10 INJECTION, SOLUTION INTRAMUSCULAR; INTRAVENOUS at 17:14

## 2018-05-03 RX ADMIN — ASPIRIN 81 MG 81 MG: 81 TABLET ORAL at 08:53

## 2018-05-03 RX ADMIN — INSULIN LISPRO 6 UNITS: 100 INJECTION, SOLUTION INTRAVENOUS; SUBCUTANEOUS at 10:46

## 2018-05-03 RX ADMIN — Medication 325 MG: at 08:53

## 2018-05-03 RX ADMIN — METOPROLOL TARTRATE 100 MG: 50 TABLET ORAL at 21:23

## 2018-05-03 RX ADMIN — LEVOTHYROXINE SODIUM 100 MCG: 100 TABLET ORAL at 05:32

## 2018-05-03 RX ADMIN — FLUTICASONE PROPIONATE 1 PUFF: 110 AEROSOL, METERED RESPIRATORY (INHALATION) at 21:25

## 2018-05-03 RX ADMIN — RIVAROXABAN 20 MG: 20 TABLET, FILM COATED ORAL at 17:15

## 2018-05-03 RX ADMIN — NYSTATIN: 100000 POWDER TOPICAL at 08:54

## 2018-05-03 RX ADMIN — LORATADINE 10 MG: 10 TABLET ORAL at 08:54

## 2018-05-03 RX ADMIN — METOPROLOL TARTRATE 100 MG: 50 TABLET ORAL at 08:53

## 2018-05-03 RX ADMIN — POTASSIUM CHLORIDE 40 MEQ: 1500 TABLET, EXTENDED RELEASE ORAL at 17:15

## 2018-05-03 NOTE — PROGRESS NOTES
Residency Progress Note   Unit/Bed#: [facility] 707-01 Encounter: [id number]  SOD Team C       Curtis Large [de-identified] y o  female [id number]    Assessment/Plan:    Active Problems:    Essential hypertension    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 native heart without angina pectoris    Type 2 diabetes mellitus with complication, with long-term current use of insulin (HCC)    Atrial flutter (HCC)    Hyperlipidemia    Gastroesophageal reflux disease without esophagitis    Moderate mitral stenosis    Acute respiratory failure with hypoxia (HCC)    Asthma    Asymptomatic carotid artery stenosis, bilateral    Atherosclerosis of artery of extremity with ulceration (HCC)    Hx of CABG    Chronic anticoagulation    Chronic combined systolic and diastolic congestive heart failure (HCC)    Postoperative hypothyroidism    Paroxysmal atrial fibrillation (HCC)    Hypokalemia    Hypocalcemia    Left bundle branch block (LBBB)    S/P TAVR (transcatheter aortic valve replacement)    Expressive aphasia    Multiple lacunar infarcts ([facility])    Vomiting    History of CVA (cerebrovascular accident)    Seizure ([facility] [address] )    Supratherapeutic INR    NSVT (nonsustained ventricular tachycardia) ([facility])    Pulmonary hypertension (HCC)    Anemia    Severe sepsis (HCC)    Lactic acidosis      Nonsustained ventricular tachycardia: This is likely due to cardiomyopathy  Patient has underlying ejection fraction of 35%  Continue to replete electrolytes  -patient's Lopressor has been titrated back to home dose of 100 mg b i d  Chronic combined systolic and diastolic heart failure with ejection fraction 35%:   -continue intravenous diuresis  His previously believe that the patient had an underlying pneumonia, however her respiratory distress was likely secondary to her underlying heart failure and volume overload  New Onset Seizure:    -Patient had EMU which did not show any electrographic evidence of seizure      -MRI pending   -Seizure precautions   -continue Keppra, appreciate Neurology recommendations  Hx of cardioembolic stroke:    -previously failed Coumadin and transitioned to Xarelto     -continue Xarelto    Atrial fibrillation/flutter:  Continue Xarelto and Lopressor  CAD: continue Lipitor, resume ASA, Lopressor  HTN:  Blood pressure was mildly elevated this morning, will continue to trend  DM2:  Goal blood glucose less than 180, A1c was 7 7% 2018    -continue sliding scale insulin   -I have changed the patient to a diabetic diet  COPD: No acute exacerbation     -continue Flovent and albuterol  Hypothyroidism:  Continue Synthroid      Disposition:  PT and OT eval   Awaiting MRI brain      Subjective:   Patient is much more awake today, alert, resting comfortably without any respiratory distress  She has no complaints, denies any headaches, no nausea, vomiting, shortness of breath, chest pain, abdominal pain  Vitals: Temp (24hrs), Av 9 °F (37 2 °C), Min:98 4 °F (36 9 °C), Max:99 5 °F (37 5 °C)  Current: Temperature: 99 °F (37 2 °C)  Vitals:    18 0326 18 0600 18 0722 18 0723   BP: 150/66  (!) 172/77    BP Location: Left arm  Left arm    Pulse: 63  94    Resp: 18  18    Temp: 99 5 °F (37 5 °C)  99 °F (37 2 °C)    TempSrc: Oral  Axillary    SpO2: 94%  95% 94%   Weight:  75 3 kg (166 lb 0 1 oz)     Height:        Body mass index is 28 49 kg/m²  I/O last 24 hours:   In: 1330 [P O :780; IV Piggyback:550]  Out: 2825 [Urine:2825]      Physical Exam:     General: No acute distress, resting comfortably  HEENT: Normocephalic, atraumatic, EOMI, no scleral icterus  CV: RRR, +s1,s2, + murmur in mitral area  Lungs: CTA B/L, no rales, rhonchi or wheezes  Abd: Soft, non-tender, non-distended, +BSx4  Ext: No clubbing, cyanosis, or edema  Neuro: CN 2-12 grossly intact, speech fluent, MAEx4     Invasive Devices     Peripheral Intravenous Line            Peripheral IV 18 Left Antecubital 2 days                          Labs:   Recent Results (from the past 24 hour(s))   Fingerstick Glucose (POCT)    Collection Time: 05/02/18 11:54 AM   Result Value Ref Range    POC Glucose 232 (H) 65 - 140 mg/dl   Fingerstick Glucose (POCT)    Collection Time: 05/02/18  4:19 PM   Result Value Ref Range    POC Glucose 289 (H) 65 - 140 mg/dl   Potassium    Collection Time: 05/02/18  6:04 PM   Result Value Ref Range    Potassium 4 1 3 5 - 5 3 mmol/L   Fingerstick Glucose (POCT)    Collection Time: 05/02/18  8:33 PM   Result Value Ref Range    POC Glucose 227 (H) 65 - 140 mg/dl   Basic metabolic panel    Collection Time: 05/03/18  4:58 AM   Result Value Ref Range    Sodium 142 136 - 145 mmol/L    Potassium 4 0 3 5 - 5 3 mmol/L    Chloride 106 100 - 108 mmol/L    CO2 30 21 - 32 mmol/L    Anion Gap 6 4 - 13 mmol/L    BUN 11 5 - 25 mg/dL    Creatinine 0 69 0 60 - 1 30 mg/dL    Glucose 167 (H) 65 - 140 mg/dL    Calcium 7 4 (L) 8 3 - 10 1 mg/dL    eGFR 82 ml/min/1 73sq m   Fingerstick Glucose (POCT)    Collection Time: 05/03/18  6:23 AM   Result Value Ref Range    POC Glucose 195 (H) 65 - 140 mg/dl       Radiology Results: I have personally reviewed pertinent reports  Other Diagnostic Testing:   I have personally reviewed pertinent reports          Active Meds:   Current Facility-Administered Medications   Medication Dose Route Frequency    acetaminophen (TYLENOL) rectal suppository 650 mg  650 mg Rectal Q4H PRN    acetaminophen (TYLENOL) tablet 650 mg  650 mg Oral Q6H PRN    albuterol inhalation solution 2 5 mg  2 5 mg Nebulization Q4H PRN    aspirin chewable tablet 81 mg  81 mg Oral Daily    atorvastatin (LIPITOR) tablet 80 mg  80 mg Oral Daily With Dinner    ferrous sulfate tablet 325 mg  325 mg Oral Daily With Breakfast    fluticasone (FLOVENT HFA) 110 MCG/ACT inhaler 1 puff  1 puff Inhalation Q12H LANCE    furosemide (LASIX) injection 40 mg  40 mg Intravenous BID (diuretic)    influenza inactivated quadrivalent vaccine (FLULAVAL) IM injection 0 5 mL  0 5 mL Intramuscular Prior to discharge    insulin lispro (HumaLOG) 100 units/mL subcutaneous injection 1-6 Units  1-6 Units Subcutaneous Q6H [address]    labetalol (NORMODYNE) injection 10 mg  10 mg Intravenous Q6H PRN    levETIRAcetam (KEPPRA) tablet 750 mg  750 mg Oral Q12H [address]    levothyroxine tablet 100 mcg  100 mcg Oral Daily    loratadine (CLARITIN) tablet 10 mg  10 mg Oral Daily    LORazepam (ATIVAN) 2 mg/mL injection 2 mg  2 mg Intravenous PRN    metoclopramide (REGLAN) injection 10 mg  10 mg Intravenous Q6H PRN    metoprolol tartrate (LOPRESSOR) tablet 100 mg  100 mg Oral Q12H LANCE    nystatin (MYCOSTATIN) powder   Topical BID    omeprazole (PRILOSEC) suspension 2 mg/mL  20 mg Oral Daily    polyethylene glycol (MIRALAX) packet 17 g  17 g Oral Daily PRN    potassium chloride (K-DUR,KLOR-CON) CR tablet 40 mEq  40 mEq Oral BID    rivaroxaban (XARELTO) tablet 20 mg  20 mg Oral Daily With Dinner    senna-docusate sodium (SENOKOT S) 8 6-50 mg per tablet 1 tablet  1 tablet Oral HS         VTE Pharmacologic Prophylaxis:  Xarelto  VTE Mechanical Prophylaxis: sequential compression device    Daniel Leonardo DO

## 2018-05-03 NOTE — PROGRESS NOTES
Progress Note - Cardiology   Germania Box [de-identified] y o  female MRN: [id number]  Unit/Bed#: [facility] 707-01 Encounter: [id number]        Active Problems:    Essential hypertension    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 native heart without angina pectoris    Type 2 diabetes mellitus with complication, with long-term current use of insulin (HCC)    Atrial flutter (HCC)    Hyperlipidemia    Gastroesophageal reflux disease without esophagitis    Moderate mitral stenosis    Acute respiratory failure with hypoxia (HCC)    Asthma    Asymptomatic carotid artery stenosis, bilateral    Atherosclerosis of artery of extremity with ulceration (HCC)    Hx of CABG    Chronic anticoagulation    Chronic combined systolic and diastolic congestive heart failure (HCC)    Postoperative hypothyroidism    Paroxysmal atrial fibrillation (HCC)    Hypokalemia    Hypocalcemia    Left bundle branch block (LBBB)    S/P TAVR (transcatheter aortic valve replacement)    Expressive aphasia    Multiple lacunar infarcts ([facility])    Vomiting    History of CVA (cerebrovascular accident)    Seizure ([facility] [address] )    Supratherapeutic INR    NSVT (nonsustained ventricular tachycardia) ([facility])    Pulmonary hypertension (HCC)    Anemia    Severe sepsis ([facility])    Lactic acidosis    Plan:     1  NSVT  Potassium 3 3 at the time  Likely due to cardiomyopathy with EF 35% by echo and electrolyte imbalances  Repleted electrolytes , continue beta blocker  Was on Metoprolol tartrate 100 bid prior to admission which is dose receiving as of today       2  Chronic combined systolic/diastolic HF    EF 47% by echo 5/18 with moderate diffuse hypokinesis and RWMA inferoseptal and inferior walls  RV dilated and reduced function  Echo 4/18 had shown EF 50% after TAVR, but prior to TAVR EF was also 35% by echo 3/18  Here appears volume overloaded by CXR, versus aspiration PNA   Being treated with cefepime, Flagyl, and Vanco  Will also treat with standing IV Lasix to help improve respiratory status and help wean O2 as able  On r/la today- hopefully change to oral diuretics tomorrow  Few rales bases  3  CAD- Cardiac catheterization 3/18 showed patent LIMA to LAD, SVG to D1 with 50% stenosis, Y graft to D2,and D3, SVG to OM1 was 100% stenosed, SVG to RPDA was 100% stenosed  Likely LV function decline due to underlying CAD in setting of strain from other comorbidities/acute illnesses  Continue medical management for now, repeat echo when patient recovers from acute illness, as outpt  On beta blocker, was on Lisinopril 2 5 mg daily at home, as well as KCL 10 bid, and Lasix 40 bid  On asa       3  Paroxysmal atrial fibrillation  Continue home Metoprolol dose and Xarelto 20       4  Severe AS s/p TAVR  On aspirin, statin  Echo- bioprosthesis normal function, no significant perivalvular regurg  Additionally moderate MS and moderate pulmonary hypertension         Follows with Dr Iqra Lyles as outpt       Subjective/Objective   Chief Complaint/Subjective  No complaints but want un thickened water  No cp,sob        Vitals: /80 (BP Location: Left arm)   Pulse 96   Temp 97 9 °F (36 6 °C) (Oral)   Resp 18   Ht 5' 4" (1 626 m)   Wt 75 3 kg (166 lb 0 1 oz)   LMP  (LMP Unknown)   SpO2 96%   BMI 28 49 kg/m²     Vitals:    05/01/18 0600 05/03/18 0600   Weight: 75 7 kg (166 lb 14 2 oz) 75 3 kg (166 lb 0 1 oz)     Orthostatic Blood Pressures      Most Recent Value   Blood Pressure  127/80 filed at 05/03/2018 1053   Patient Position - Orthostatic VS  Lying filed at 05/03/2018 1053            Intake/Output Summary (Last 24 hours) at 05/03/18 1101  Last data filed at 05/03/18 0900   Gross per 24 hour   Intake              620 ml   Output             3425 ml   Net            -2805 ml       Invasive Devices     Peripheral Intravenous Line            Peripheral IV 05/01/18 Left Antecubital 2 days                Current Facility-Administered Medications   Medication Dose Route Frequency    acetaminophen (TYLENOL) rectal suppository 650 mg  650 mg Rectal Q4H PRN    acetaminophen (TYLENOL) tablet 650 mg  650 mg Oral Q6H PRN    albuterol inhalation solution 2 5 mg  2 5 mg Nebulization Q4H PRN    aspirin chewable tablet 81 mg  81 mg Oral Daily    atorvastatin (LIPITOR) tablet 80 mg  80 mg Oral Daily With Dinner    ferrous sulfate tablet 325 mg  325 mg Oral Daily With Breakfast    fluticasone (FLOVENT HFA) 110 MCG/ACT inhaler 1 puff  1 puff Inhalation Q12H [facility]    furosemide (LASIX) injection 40 mg  40 mg Intravenous BID (diuretic)    influenza inactivated quadrivalent vaccine (FLULAVAL) IM injection 0 5 mL  0 5 mL Intramuscular Prior to discharge    insulin lispro (HumaLOG) 100 units/mL subcutaneous injection 1-6 Units  1-6 Units Subcutaneous Q6H [facility]    labetalol (NORMODYNE) injection 10 mg  10 mg Intravenous Q6H PRN    levETIRAcetam (KEPPRA) tablet 750 mg  750 mg Oral Q12H [facility]    levothyroxine tablet 100 mcg  100 mcg Oral Daily    loratadine (CLARITIN) tablet 10 mg  10 mg Oral Daily    LORazepam (ATIVAN) 2 mg/mL injection 2 mg  2 mg Intravenous PRN    metoclopramide (REGLAN) injection 10 mg  10 mg Intravenous Q6H PRN    metoprolol tartrate (LOPRESSOR) tablet 100 mg  100 mg Oral Q12H [facility]    nystatin (MYCOSTATIN) powder   Topical BID    omeprazole (PRILOSEC) suspension 2 mg/mL  20 mg Oral Daily    polyethylene glycol (MIRALAX) packet 17 g  17 g Oral Daily PRN    potassium chloride (K-DUR,KLOR-CON) CR tablet 40 mEq  40 mEq Oral BID    rivaroxaban (XARELTO) tablet 20 mg  20 mg Oral Daily With Dinner    senna-docusate sodium (SENOKOT S) 8 6-50 mg per tablet 1 tablet  1 tablet Oral HS         Physical Exam: /80 (BP Location: Left arm)   Pulse 96   Temp 97 9 °F (36 6 °C) (Oral)   Resp 18   Ht 5' 4" (1 626 m)   Wt 75 3 kg (166 lb 0 1 oz)   LMP  (LMP Unknown)   SpO2 96%   BMI 28 49 kg/m²     General Appearance:    Alert, cooperative, no distress, appears stated age Head:    Normocephalic, no scleral icterus   Eyes:    PERRL   Nose:   Nares normal, septum midline, no drainage    Throat:   Lips, mucosa, and tongue normal   Neck:   Supple, symmetrical, trachea midline,            Lungs:     Rales bases to auscultation bilaterally, respirations unlabored on r/a        Heart:    Regular rate and rhythm, S1 and S2 normal   Abdomen:     Soft, non-tender, bowel sounds active all four quadrants,     no masses, no organomegaly   Extremities:   Extremities normal, atraumatic, no cyanosis or edema   Pulses:   2+ and symmetric all extremities   Skin:   Skin color, texture, turgor normal, no rashes or lesions   Neurologic:   Alert and oriented to person place and time                 Lab Results:   Recent Results (from the past 72 hour(s))   Fingerstick Glucose (POCT)    Collection Time: 04/30/18 11:33 AM   Result Value Ref Range    POC Glucose 143 (H) 65 - 140 mg/dl   Hemoglobin and hematocrit, blood    Collection Time: 04/30/18 11:56 AM   Result Value Ref Range    Hemoglobin 8 1 (L) 11 5 - 15 4 g/dL    Hematocrit 26 1 (L) 34 8 - 46 1 %   Hemolysis Smear    Collection Time: 04/30/18 11:56 AM   Result Value Ref Range    Hemolysis Smear No Schistocytes or Helmet Cells noted    Fingerstick Glucose (POCT)    Collection Time: 04/30/18  5:33 PM   Result Value Ref Range    POC Glucose 115 65 - 140 mg/dl   ECG 12 lead    Collection Time: 04/30/18  6:02 PM   Result Value Ref Range    Ventricular Rate 95 BPM    Atrial Rate 95 BPM    ME Interval 221 ms    QRSD Interval 146 ms    QT Interval 392 ms    QTC Interval 493 ms    P Pioneer 59 degrees    QRS Axis -54 degrees    T Wave Axis 107 degrees   ECG 12 lead    Collection Time: 04/30/18  6:05 PM   Result Value Ref Range    Ventricular Rate 96 BPM    Atrial Rate 96 BPM    ME Interval 225 ms    QRSD Interval 142 ms    QT Interval 392 ms    QTC Interval 496 ms    P Pioneer 67 degrees    QRS Axis -53 degrees    T Wave Axis 99 degrees   Fingerstick Glucose (POCT)    Collection Time: 04/30/18  9:00 PM   Result Value Ref Range    POC Glucose 186 (H) 65 - 140 mg/dl   Hemoglobin    Collection Time: 05/01/18  1:19 AM   Result Value Ref Range    Hemoglobin 8 3 (L) 11 5 - 15 4 g/dL   Magnesium    Collection Time: 05/01/18  5:13 AM   Result Value Ref Range    Magnesium 1 7 1 6 - 2 6 mg/dL   Magnesium    Collection Time: 05/01/18  5:13 AM   Result Value Ref Range    Magnesium 1 6 1 6 - 2 6 mg/dL   Phosphorus    Collection Time: 05/01/18  5:13 AM   Result Value Ref Range    Phosphorus 2 6 2 3 - 4 1 mg/dL   Basic metabolic panel    Collection Time: 05/01/18  5:13 AM   Result Value Ref Range    Sodium 144 136 - 145 mmol/L    Potassium 4 1 3 5 - 5 3 mmol/L    Chloride 111 (H) 100 - 108 mmol/L    CO2 26 21 - 32 mmol/L    Anion Gap 7 4 - 13 mmol/L    BUN 13 5 - 25 mg/dL    Creatinine 0 66 0 60 - 1 30 mg/dL    Glucose 127 65 - 140 mg/dL    Calcium 6 7 (L) 8 3 - 10 1 mg/dL    eGFR 84 ml/min/1 73sq m   CBC    Collection Time: 05/01/18  5:33 AM   Result Value Ref Range    WBC 7 35 4 31 - 10 16 Thousand/uL    RBC 4 08 3 81 - 5 12 Million/uL    Hemoglobin 8 4 (L) 11 5 - 15 4 g/dL    Hematocrit 28 4 (L) 34 8 - 46 1 %    MCV 70 (L) 82 - 98 fL    MCH 20 6 (L) 26 8 - 34 3 pg    MCHC 29 6 (L) 31 4 - 37 4 g/dL    RDW 19 2 (H) 11 6 - 15 1 %    Platelets 868 046 - 940 Thousands/uL    MPV 9 9 8 9 - 12 7 fL   Protime-INR    Collection Time: 05/01/18  5:42 AM   Result Value Ref Range    Protime 19 3 (H) 12 1 - 14 4 seconds    INR 1 61 (H) 0 86 - 1 16   Fingerstick Glucose (POCT)    Collection Time: 05/01/18  6:41 AM   Result Value Ref Range    POC Glucose 144 (H) 65 - 140 mg/dl   Magnesium    Collection Time: 05/01/18 10:26 AM   Result Value Ref Range    Magnesium 1 8 1 6 - 2 6 mg/dL   Iron Saturation %    Collection Time: 05/01/18 10:26 AM   Result Value Ref Range    Iron Saturation 7 %    TIBC 246 (L) 250 - 450 ug/dL    Iron 16 (L) 50 - 170 ug/dL   Ferritin    Collection Time: 05/01/18 10:26 AM Result Value Ref Range    Ferritin 46 8 - 388 ng/mL   Fingerstick Glucose (POCT)    Collection Time: 05/01/18 10:49 AM   Result Value Ref Range    POC Glucose 278 (H) 65 - 140 mg/dl   ECG 12 lead    Collection Time: 05/01/18  2:50 PM   Result Value Ref Range    Ventricular Rate 127 BPM    Atrial Rate 133 BPM    DC Interval  ms    QRSD Interval 138 ms    QT Interval 362 ms    QTC Interval 526 ms    P Axis  degrees    QRS Axis -26 degrees    T Wave Axis 104 degrees   ECG 12 lead    Collection Time: 05/01/18  2:52 PM   Result Value Ref Range    Ventricular Rate 133 BPM    Atrial Rate 133 BPM    DC Interval  ms    QRSD Interval 144 ms    QT Interval 366 ms    QTC Interval 544 ms    P Axis  degrees    QRS Axis -31 degrees    T Wave Axis 108 degrees   Fingerstick Glucose (POCT)    Collection Time: 05/01/18  4:16 PM   Result Value Ref Range    POC Glucose 370 (H) 65 - 140 mg/dl   CBC    Collection Time: 05/01/18  4:21 PM   Result Value Ref Range    WBC 14 78 (H) 4 31 - 10 16 Thousand/uL    RBC 4 33 3 81 - 5 12 Million/uL    Hemoglobin 9 0 (L) 11 5 - 15 4 g/dL    Hematocrit 29 9 (L) 34 8 - 46 1 %    MCV 69 (L) 82 - 98 fL    MCH 20 8 (L) 26 8 - 34 3 pg    MCHC 30 1 (L) 31 4 - 37 4 g/dL    RDW 19 1 (H) 11 6 - 15 1 %    Platelets 266 430 - 412 Thousands/uL    MPV 10 1 8 9 - 12 7 fL   Basic metabolic panel    Collection Time: 05/01/18  4:21 PM   Result Value Ref Range    Sodium 140 136 - 145 mmol/L    Potassium 4 3 3 5 - 5 3 mmol/L    Chloride 108 100 - 108 mmol/L    CO2 22 21 - 32 mmol/L    Anion Gap 10 4 - 13 mmol/L    BUN 11 5 - 25 mg/dL    Creatinine 0 86 0 60 - 1 30 mg/dL    Glucose 339 (H) 65 - 140 mg/dL    Calcium 7 2 (L) 8 3 - 10 1 mg/dL    eGFR 64 ml/min/1 73sq m   Magnesium    Collection Time: 05/01/18  4:21 PM   Result Value Ref Range    Magnesium 2 2 1 6 - 2 6 mg/dL   Phosphorus    Collection Time: 05/01/18  4:21 PM   Result Value Ref Range    Phosphorus 2 9 2 3 - 4 1 mg/dL   Troponin I    Collection Time: 05/01/18  4:21 PM   Result Value Ref Range    Troponin I 0 03 <=0 04 ng/mL   Manual Differential (Non Wam)    Collection Time: 05/01/18  4:21 PM   Result Value Ref Range    Segmented % 73 %    Lymphocytes % 17 %    Monocytes % 7 4 - 12 %    Eosinophils % 3 0 - 6 %    Lymphocytes Absolute 2 51 0 60 - 4 47 Thousand/uL    Monocytes Absolute 1 03 0 00 - 1 22 Thousand/uL    Eosinophils Absolute 0 44 0 00 - 0 61 Thousand/uL    Total Counted 100     Anisocytosis Present     Poikilocytes Present     Platelet Estimate Adequate Adequate   Blood gas, arterial    Collection Time: 05/01/18  4:22 PM   Result Value Ref Range    pH, Arterial 7 295 (L) 7 350 - 7 450    pCO2, Arterial 40 5 36 0 - 44 0 mm Hg    pO2, Arterial 74 0 (L) 75 0 - 129 0 mm Hg    HCO3, Arterial 19 3 (L) 22 0 - 28 0 mmol/L    Base Excess, Arterial -6 8 mmol/L    O2 Content, Arterial 12 9 (L) 16 0 - 23 0 mL/dL    O2 HGB,Arterial  91 1 (L) 94 0 - 97 0 %    SOURCE Radial, Right     ZAY TEST Yes     ROOM AIR FIO2 100 %   Lactic Acid STAT and in 2 hours if first result greater than 2    Collection Time: 05/01/18  5:21 PM   Result Value Ref Range    LACTIC ACID 3 2 (HH) 0 5 - 2 0 mmol/L   Blood culture    Collection Time: 05/01/18  5:22 PM   Result Value Ref Range    Blood Culture No Growth at 24 hrs  Blood culture    Collection Time: 05/01/18  5:23 PM   Result Value Ref Range    Blood Culture No Growth at 24 hrs      Procalcitonin    Collection Time: 05/01/18  5:57 PM   Result Value Ref Range    Procalcitonin 0 10 <=0 25 ng/ml   Comprehensive metabolic panel    Collection Time: 05/01/18  5:57 PM   Result Value Ref Range    Sodium 140 136 - 145 mmol/L    Potassium 4 0 3 5 - 5 3 mmol/L    Chloride 106 100 - 108 mmol/L    CO2 26 21 - 32 mmol/L    Anion Gap 8 4 - 13 mmol/L    BUN 12 5 - 25 mg/dL    Creatinine 0 85 0 60 - 1 30 mg/dL    Glucose 315 (H) 65 - 140 mg/dL    Calcium 7 0 (L) 8 3 - 10 1 mg/dL    AST 44 5 - 45 U/L    ALT 23 12 - 78 U/L    Alkaline Phosphatase 95 46 - 116 U/L    Total Protein 6 8 6 4 - 8 2 g/dL    Albumin 2 7 (L) 3 5 - 5 0 g/dL    Total Bilirubin 0 79 0 20 - 1 00 mg/dL    eGFR 65 ml/min/1 73sq m   Protime-INR    Collection Time: 05/01/18  5:57 PM   Result Value Ref Range    Protime 20 6 (H) 12 1 - 14 4 seconds    INR 1 75 (H) 0 86 - 1 16   Fingerstick Glucose (POCT)    Collection Time: 05/01/18  6:16 PM   Result Value Ref Range    POC Glucose 322 (H) 65 - 140 mg/dl   Fingerstick Glucose (POCT)    Collection Time: 05/01/18 11:27 PM   Result Value Ref Range    POC Glucose 254 (H) 65 - 140 mg/dl   Basic metabolic panel    Collection Time: 05/02/18  4:52 AM   Result Value Ref Range    Sodium 143 136 - 145 mmol/L    Potassium 3 3 (L) 3 5 - 5 3 mmol/L    Chloride 107 100 - 108 mmol/L    CO2 28 21 - 32 mmol/L    Anion Gap 8 4 - 13 mmol/L    BUN 11 5 - 25 mg/dL    Creatinine 0 62 0 60 - 1 30 mg/dL    Glucose 125 65 - 140 mg/dL    Calcium 7 1 (L) 8 3 - 10 1 mg/dL    eGFR 85 ml/min/1 73sq m   CBC and differential    Collection Time: 05/02/18  4:52 AM   Result Value Ref Range    WBC 8 28 4 31 - 10 16 Thousand/uL    RBC 3 95 3 81 - 5 12 Million/uL    Hemoglobin 7 9 (L) 11 5 - 15 4 g/dL    Hematocrit 26 9 (L) 34 8 - 46 1 %    MCV 68 (L) 82 - 98 fL    MCH 20 0 (L) 26 8 - 34 3 pg    MCHC 29 4 (L) 31 4 - 37 4 g/dL    RDW 19 0 (H) 11 6 - 15 1 %    MPV 9 6 8 9 - 12 7 fL    Platelets 344 639 - 069 Thousands/uL    nRBC 0 /100 WBCs    Neutrophils Relative 70 43 - 75 %    Lymphocytes Relative 21 14 - 44 %    Monocytes Relative 8 4 - 12 %    Eosinophils Relative 1 0 - 6 %    Basophils Relative 0 0 - 1 %    Neutrophils Absolute 5 74 1 85 - 7 62 Thousands/µL    Lymphocytes Absolute 1 72 0 60 - 4 47 Thousands/µL    Monocytes Absolute 0 67 0 17 - 1 22 Thousand/µL    Eosinophils Absolute 0 11 0 00 - 0 61 Thousand/µL    Basophils Absolute 0 01 0 00 - 0 10 Thousands/µL   Lactic acid, plasma    Collection Time: 05/02/18  4:52 AM   Result Value Ref Range    LACTIC ACID 1 5 0 5 - 2 0 mmol/L   Fingerstick Glucose (POCT)    Collection Time: 18  6:29 AM   Result Value Ref Range    POC Glucose 139 65 - 140 mg/dl   Fingerstick Glucose (POCT)    Collection Time: 18 11:54 AM   Result Value Ref Range    POC Glucose 232 (H) 65 - 140 mg/dl   Fingerstick Glucose (POCT)    Collection Time: 18  4:19 PM   Result Value Ref Range    POC Glucose 289 (H) 65 - 140 mg/dl   Potassium    Collection Time: 18  6:04 PM   Result Value Ref Range    Potassium 4 1 3 5 - 5 3 mmol/L   Fingerstick Glucose (POCT)    Collection Time: 18  8:33 PM   Result Value Ref Range    POC Glucose 227 (H) 65 - 140 mg/dl   Basic metabolic panel    Collection Time: 18  4:58 AM   Result Value Ref Range    Sodium 142 136 - 145 mmol/L    Potassium 4 0 3 5 - 5 3 mmol/L    Chloride 106 100 - 108 mmol/L    CO2 30 21 - 32 mmol/L    Anion Gap 6 4 - 13 mmol/L    BUN 11 5 - 25 mg/dL    Creatinine 0 69 0 60 - 1 30 mg/dL    Glucose 167 (H) 65 - 140 mg/dL    Calcium 7 4 (L) 8 3 - 10 1 mg/dL    eGFR 82 ml/min/1 73sq m   Fingerstick Glucose (POCT)    Collection Time: 18  6:23 AM   Result Value Ref Range    POC Glucose 195 (H) 65 - 140 mg/dl   Fingerstick Glucose (POCT)    Collection Time: 18 10:30 AM   Result Value Ref Range    POC Glucose 455 (H) 65 - 140 mg/dl     [facility] [address]  [facility] [address] [facility]  [phone]     Transthoracic Echocardiogram  2D, M-mode, Doppler, and Color Doppler     Study date:  01-May-2018     Patient: Jason Frank  MR number: [id number]  Account number: [de-identified]  : 1940  Age: [de-identified] years  Gender: Female  Status: Inpatient  Location: Bedside  Height: 64 in  Weight: 161 7 lb  BP: 171/ 83 mmHg     Indications: Aortic Valve Disorder Evaluate prosthetic aortic valve   Evaluate prosthetic mitral valve      Diagnoses: I35 9 - Nonrheumatic aortic valve disorder, unspecified     Sonographer:  Adriane Garrett RDCS  Primary Physician:  Stephanie Nash MD  Group:  [facility]  Interpreting Physician:  Anna Mcghee MD     SUMMARY     LEFT VENTRICLE:  The ventricle was mildly dilated  Systolic function was moderately to markedly reduced  Ejection fraction was estimated to be 35 %  There was moderate diffuse hypokinesis with distinct regional wall motion abnormalities  More severe hypokinesis to akinesis was seen in the inferoseptal and inferior walls  Wall thickness was mildly increased      VENTRICULAR SEPTUM:  There was moderate dyssynergic motion  These changes are consistent with LBBB      RIGHT VENTRICLE:  The ventricle was moderately dilated  Systolic function was mildly reduced      LEFT ATRIUM:  The atrium was moderately dilated      RIGHT ATRIUM:  The atrium was moderately dilated      MITRAL VALVE:  There was marked annular calcification  There was moderate diffuse thickening  There was moderately reduced leaflet separation  Transmitral velocity was increased due to valvular stenosis  There was moderate stenosis  There was mild regurgitation      AORTIC VALVE:  A bioprosthesis was present  It exhibited normal function  There was no significant perivalvular regurgitation  Valve mean gradient was 8 mmHg      TRICUSPID VALVE:  There was moderate regurgitation  Pulmonary artery systolic pressure was markedly increased  Estimated peak PA pressure was 70 mmHg      IVC, HEPATIC VEINS:  The inferior vena cava was dilated      HISTORY: PRIOR HISTORY: TAVR, Mitral Stenosis, CABG, MI, HTN, Asthma, CHF, DM, CVA, CAD, HLD     PROCEDURE: The procedure was performed at the bedside  This was a routine study  The transthoracic approach was used  The study included complete 2D imaging, M-mode, complete spectral Doppler, and color Doppler  The heart rate was 100 bpm,  at the start of the study  Images were obtained from the parasternal, apical, subcostal, and suprasternal notch acoustic windows  Echocardiographic views were limited due to restricted patient mobility, poor patient compliance, and lung  interference  This was a technically difficult study      LEFT VENTRICLE: The ventricle was mildly dilated  Systolic function was moderately to markedly reduced  Ejection fraction was estimated to be 35 %  There was moderate diffuse hypokinesis with distinct regional wall motion abnormalities  More severe hypokinesis to akinesis was seen in the inferoseptal and inferior walls  Wall thickness was mildly increased  DOPPLER: The study was not technically sufficient to allow evaluation of LV diastolic function      VENTRICULAR SEPTUM: Thickness was mildly increased  There was moderate dyssynergic motion  These changes are consistent with LBBB      RIGHT VENTRICLE: The ventricle was moderately dilated  Systolic function was mildly reduced  Wall thickness was normal      LEFT ATRIUM: The atrium was moderately dilated      RIGHT ATRIUM: The atrium was moderately dilated      MITRAL VALVE: There was marked annular calcification  There was moderate diffuse thickening  There was moderately reduced leaflet separation  DOPPLER: Transmitral velocity was increased due to valvular stenosis  There was moderate  stenosis  There was mild regurgitation      AORTIC VALVE: A bioprosthesis was present  It exhibited normal function  DOPPLER: Transaortic velocity was within the normal range  There was no evidence for stenosis  There was no significant regurgitation  There was no significant  perivalvular regurgitation      TRICUSPID VALVE: The valve structure was normal  DOPPLER: The transtricuspid velocity was within the normal range  There was moderate regurgitation  Pulmonary artery systolic pressure was markedly increased  Estimated peak PA pressure was  70 mmHg      PULMONIC VALVE: Not well visualized  DOPPLER: The transpulmonic velocity was within the normal range   There was no regurgitation      PERICARDIUM: There was no pericardial effusion      AORTA: The root exhibited normal size  The ascending aorta was normal in size      SYSTEMIC VEINS: IVC: The inferior vena cava was dilated      PULMONARY VEINS: DOPPLER: Doppler signals were not recordable in the pulmonary vein(s)      MEASUREMENT TABLES     DOPPLER MEASUREMENTS  Aortic valve   (Reference normals)  Mean gradient   8 mmHg   (--)     SYSTEM MEASUREMENT TABLES       Imaging: I have personally reviewed pertinent reports  Tele- NSR    Counseling / Coordination of Care  Total time spent today 30 minutes  Greater than 50% of total time was spent with the patient and / or family counseling and / or coordination of care

## 2018-05-03 NOTE — CASE MANAGEMENT
Continued Stay Review    Date: 5/3    Vital Signs: /80 (BP Location: Left arm)   Pulse 96   Temp 97 9 °F (36 6 °C) (Oral)   Resp 18   Ht 5' 4" (1 626 m)   Wt 75 3 kg (166 lb 0 1 oz)   LMP  (LMP Unknown)   SpO2 96%   BMI 28 49 kg/m²     Medications:   Scheduled Meds:   Current Facility-Administered Medications:  acetaminophen 650 mg Rectal Q4H PRN Rae Huertas PA-C   acetaminophen 650 mg Oral Q6H PRN Isa Tyler DO   albuterol 2 5 mg Nebulization Q4H PRN Dain Vallecillo MD   aspirin 81 mg Oral Daily Faustine Severin, DO   atorvastatin 80 mg Oral Daily With Maine Galeano MD   ferrous sulfate 325 mg Oral Daily With Breakfast Max Jean-Baptiste, DO   fluticasone 1 puff Inhalation Q12H [facility] Altaf Salcedo MD   furosemide 40 mg Intravenous BID (diuretic) Iqra Lyles MD   influenza vaccine 0 5 mL Intramuscular Prior to discharge Dain Vallecillo MD   insulin lispro 1-6 Units Subcutaneous Q6H [facility] Rae Huertas PA-C   labetalol 10 mg Intravenous Q6H PRN Margareth Estevez DO   levETIRAcetam 750 mg Oral Q12H [facility] RAMO Lantigua   levothyroxine 100 mcg Oral Daily Altaf Salcedo MD   loratadine 10 mg Oral Daily Vilma Bolaños MD   LORazepam 2 mg Intravenous PRN Margareth Estevez DO   metoclopramide 10 mg Intravenous Q6H PRN Altaf Salcedo MD   metoprolol tartrate 100 mg Oral Q12H [facility] Reid Campo MD   nystatin  Topical BID Margareth Estevez DO   omeprazole (PRILOSEC) suspension 2 mg/mL 20 mg Oral Daily Flaco Estevez DO   polyethylene glycol 17 g Oral Daily PRN Omayra Willard MD   potassium chloride 40 mEq Oral BID Reid Campo MD   rivaroxaban 20 mg Oral Daily With The DO Roseann   senna-docusate sodium 1 tablet Oral HS Ashlie James PA-C     Continuous Infusions:    PRN Meds:   acetaminophen    albuterol    influenza vaccine    labetalol    LORazepam    metoclopramide    polyethylene glycol    Abnormal Labs/Diagnostic Results:  Gluc  167, tiera  7 4  MRI brain - pending     Age/Sex: 78 y o  female     Assessment/Plan: Assessment/Plan:     Active Problems:    Essential hypertension    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 native heart without angina pectoris    Type 2 diabetes mellitus with complication, with long-term current use of insulin ([facility])    Atrial flutter (HCC)    Hyperlipidemia    Gastroesophageal reflux disease without esophagitis    Moderate mitral stenosis    Acute respiratory failure with hypoxia ([facility])    Asthma    Asymptomatic carotid artery stenosis, bilateral    Atherosclerosis of artery of extremity with ulceration ([facility])    Hx of CABG    Chronic anticoagulation    Chronic combined systolic and diastolic congestive heart failure ([facility])    Postoperative hypothyroidism    Paroxysmal atrial fibrillation ([facility])    Hypokalemia    Hypocalcemia    Left bundle branch block (LBBB)    S/P TAVR (transcatheter aortic valve replacement)    Expressive aphasia    Multiple lacunar infarcts ([facility])    Vomiting    History of CVA (cerebrovascular accident)    Seizure ([facility] [address] )    Supratherapeutic INR    NSVT (nonsustained ventricular tachycardia) ([facility])    Pulmonary hypertension ([facility])    Anemia    Severe sepsis ([facility])    Lactic acidosis   Nonsustained ventricular tachycardia: This is likely due to cardiomyopathy  Patient has underlying ejection fraction of 35%  Continue to replete electrolytes  -patient's Lopressor has been titrated back to home dose of 100 mg b i d      Chronic combined systolic and diastolic heart failure with ejection fraction 35%:              -continue intravenous diuresis  His previously believe that the patient had an underlying pneumonia, however her respiratory distress was likely secondary to her underlying heart failure and volume overload      New Onset Seizure:               -Patient had EMU which did not show any electrographic evidence of seizure                 -MRI pending              -Seizure precautions              -continue Keppra, appreciate Neurology recommendations      Hx of cardioembolic stroke:               -previously failed Coumadin and transitioned to Xarelto                -continue Xarelto     Atrial fibrillation/flutter:  Continue Xarelto and Lopressor      CAD: continue Lipitor, resume ASA, Lopressor       HTN:  Blood pressure was mildly elevated this morning, will continue to trend      DM2:  Goal blood glucose less than 180, A1c was 7 7% April 2018               -continue sliding scale insulin              -I have changed the patient to a diabetic diet  COPD: No acute exacerbation                -continue Flovent and albuterol  Hypothyroidism:  Continue Synthroid   Disposition:  PT and OT eval   Awaiting MRI brain      cardiology note  5/3   Active Problems:    Essential hypertension    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 native heart without angina pectoris    Type 2 diabetes mellitus with complication, with long-term current use of insulin (HCC)    Atrial flutter (HCC)    Hyperlipidemia    Gastroesophageal reflux disease without esophagitis    Moderate mitral stenosis    Acute respiratory failure with hypoxia (HCC)    Asthma    Asymptomatic carotid artery stenosis, bilateral    Atherosclerosis of artery of extremity with ulceration (HCC)    Hx of CABG    Chronic anticoagulation    Chronic combined systolic and diastolic congestive heart failure (HCC)    Postoperative hypothyroidism    Paroxysmal atrial fibrillation (HCC)    Hypokalemia    Hypocalcemia    Left bundle branch block (LBBB)    S/P TAVR (transcatheter aortic valve replacement)    Expressive aphasia    Multiple lacunar infarcts (HCC)    Vomiting    History of CVA (cerebrovascular accident)    Seizure ([address] )    Supratherapeutic INR    NSVT (nonsustained ventricular tachycardia) (HCC)    Pulmonary hypertension (HCC)    Anemia    Severe sepsis (HCC)    Lactic acidosis     Plan:   1  NSVT  Potassium 3 3 at the time   Likely due to cardiomyopathy with EF 35% by echo and electrolyte imbalances  Repleted electrolytes , continue beta blocker  Was on Metoprolol tartrate 100 bid prior to admission which is dose receiving as of today     2  Chronic combined systolic/diastolic HF    EF 60% by echo 5/18 with moderate diffuse hypokinesis and RWMA inferoseptal and inferior walls  RV dilated and reduced function  Echo 4/18 had shown EF 50% after TAVR, but prior to TAVR EF was also 35% by echo 3/18  Here appears volume overloaded by CXR, versus aspiration PNA  Being treated with cefepime, Flagyl, and Vanco  Will also treat with standing IV Lasix to help improve respiratory status and help wean O2 as able   On r/la today- hopefully change to oral diuretics tomorrow  Few rales bases       3  CAD- Cardiac catheterization 3/18 showed patent LIMA to LAD, SVG to D1 with 50% stenosis, Y graft to D2,and D3, SVG to OM1 was 100% stenosed, SVG to RPDA was 100% stenosed  Likely LV function decline due to underlying CAD in setting of strain from other comorbidities/acute illnesses  Continue medical management for now, repeat echo when patient recovers from acute illness, as outpt  On beta blocker, was on Lisinopril 2 5 mg daily at home, as well as KCL 10 bid, and Lasix 40 bid  On asa       3  Paroxysmal atrial fibrillation  Continue home Metoprolol dose and Xarelto 20       4  Severe AS s/p TAVR  On aspirin, statin  Echo- bioprosthesis normal function, no significant perivalvular regurg   Additionally moderate MS and moderate pulmonary hypertension

## 2018-05-03 NOTE — PROGRESS NOTES
Progress Note - Neurology   [facility] [de-identified] y o  female MRN: [id number]  Unit/Bed#: [facility] 707-01 Encounter: [id number]    Assessment/Plan:  1 [de-identified]year old female admitted to the hospital with altered mental status and found to have CHF exacerbation  She was noted to have tonic clonic seizure during hospitalization      2  Recent embolic pattern stroke in patient with atrial fibrillation  INR was noted to be therapeutic at that time and she was changed to Xarelto  MRI brain with completed today with evidence of new stroke  Hx of atrial fibrillation as well as ef of 35%, mitral valve with marked annular calcification, moderate diffuse thickening  Hx of TAVR  Carotids completed 4-17-18 with less than 50% stenosis bilaterally  Suspect cardio embolic source       - Continue Xarelto for stroke prevention, as well as baby asa    - Secondary risk factor modification - continue on atorvastatin 80 mg QPM    Blood pressure control   -  Continue Keppra at current dosing   - PT/OT/Speech therapy     - Stroke education    - Seizure precautions     - Monitor exam and notify with changes  -  Frequent neurological checks  -  Continue supportive care, medicine team treating underlying metabolic deragnements  Subjective:   Transferred from ICU yesterday, MRI of brain completed see below  She was sleeping on arrival, but awakened easily  She denies any new complaints  No events overnight reported  ROS:  No cp, sob, palp, nausea or vomiting, new one sided weakness or paraesthesia, no new tremors noted  Vitals: Blood pressure 127/80, pulse 96, temperature 97 9 °F (36 6 °C), temperature source Oral, resp  rate 18, height 5' 4" (1 626 m), weight 75 3 kg (166 lb 0 1 oz), SpO2 96 %, not currently breastfeeding  ,Body mass index is 28 49 kg/m²         Current Facility-Administered Medications:  acetaminophen 650 mg Rectal Q4H PRN Lazarus Glenn, PA-C   acetaminophen 650 mg Oral Q6H PRN Elan Painter DO   albuterol 2 5 mg Nebulization Q4H PRN Chris Mars MD   aspirin 81 mg Oral Daily Suzi Carrizales, DO   atorvastatin 80 mg Oral Daily With George Godwin MD   ferrous sulfate 325 mg Oral Daily With Breakfast Max Jean-Baptiste, DO   fluticasone 1 puff Inhalation Q12H [address] Violetta Torres MD   furosemide 40 mg Intravenous BID (diuretic) Fadumo Reeder MD   influenza vaccine 0 5 mL Intramuscular Prior to discharge Chris Mars MD   insulin lispro 1-6 Units Subcutaneous Q6H [address] Javy Oropeza PA-C   labetalol 10 mg Intravenous Q6H PRN Roselinda Mix Bashor, DO   levETIRAcetam 750 mg Oral Q12H [address] LuanaRAMO Torres   levothyroxine 100 mcg Oral Daily Vilma Bolaños MD   loratadine 10 mg Oral Daily Vilma Bolaños MD   LORazepam 2 mg Intravenous PRN Roselinda Mix Bashor, DO   metoclopramide 10 mg Intravenous Q6H PRN Violetta Torres MD   metoprolol tartrate 100 mg Oral Q12H Teena Kelley MD   nystatin  Topical BID Roselinda Mix Bashor, DO   omeprazole (PRILOSEC) suspension 2 mg/mL 20 mg Oral Daily Flaco Estevez, DO   polyethylene glycol 17 g Oral Daily PRN Afsaneh Lara MD   potassium chloride 40 mEq Oral BID Ismael Concepcion MD   rivaroxaban 20 mg Oral Daily With The DO Roseann   senna-docusate sodium 1 tablet Oral HS Ashlie James PA-C       Physical Exam:   Constitutional - in NAD, lying in bed  HEENT - NC/AT  Cardiac - + murmur noted, rate irregular  Lungs - No respiratory distress noted  No wheezing noted  Abdomen - Soft and non tender, non distended  Extremities - No edema noted, venodynes in place      Neurological     Mental status - the patient was sleeping on arrival, however, awoken easily to voice, initially upon awaken she was confused - she reported to me she was at home, after a few minutes she corrected to hospital, able to tell me name, year, and eventually place, she was able to read and repeat    She was able to name objects with no evidence of aphasia or dysarthria       Cranial nerves 2 through 12 are intact - except mild decrease in nasolabial fold on the right       Motor - Again, she did have a mild drift with outstretched hands on the left, her power shoulder 4/5,  4/5 otherwise 5/5 jeffries on the right, on the left shoulders same 4/5 shoulders  4/5     Lowers she was able to lift leg off bed, with some hip flexion weakness, dorsi and plantar flexion intact       No tremor noted       Rapid movements are equal slightly slower on the right compared to the left       Sensation - nonfocal to touch (grossly), non focal to temperature or vibration sense       Coordination -  no ataxia noted on finger-to-nose      Toes are up going bilaterally      No evidence of clonus or myoclonus noted  No evidence of eye flickering movements or seizure like activity      Lab, Imaging and other studies:     Recent Results (from the past 24 hour(s))   Fingerstick Glucose (POCT)    Collection Time: 05/02/18  4:19 PM   Result Value Ref Range    POC Glucose 289 (H) 65 - 140 mg/dl   Potassium    Collection Time: 05/02/18  6:04 PM   Result Value Ref Range    Potassium 4 1 3 5 - 5 3 mmol/L   Fingerstick Glucose (POCT)    Collection Time: 05/02/18  8:33 PM   Result Value Ref Range    POC Glucose 227 (H) 65 - 140 mg/dl   Basic metabolic panel    Collection Time: 05/03/18  4:58 AM   Result Value Ref Range    Sodium 142 136 - 145 mmol/L    Potassium 4 0 3 5 - 5 3 mmol/L    Chloride 106 100 - 108 mmol/L    CO2 30 21 - 32 mmol/L    Anion Gap 6 4 - 13 mmol/L    BUN 11 5 - 25 mg/dL    Creatinine 0 69 0 60 - 1 30 mg/dL    Glucose 167 (H) 65 - 140 mg/dL    Calcium 7 4 (L) 8 3 - 10 1 mg/dL    eGFR 82 ml/min/1 73sq m   Fingerstick Glucose (POCT)    Collection Time: 05/03/18  6:23 AM   Result Value Ref Range    POC Glucose 195 (H) 65 - 140 mg/dl   Fingerstick Glucose (POCT)    Collection Time: 05/03/18 10:30 AM   Result Value Ref Range    POC Glucose 455 (H) 65 - 140 mg/dl     Ct Abdomen Pelvis Wo Contrast    Result Date: 4/28/2018  Narrative: CT ABDOMEN AND PELVIS WITHOUT IV CONTRAST INDICATION:   abdominal pain, nausea  Nausea and weakness for 2 days  COMPARISON: CT chest abdomen pelvis performed March 15, 2018 TECHNIQUE:  CT examination of the abdomen and pelvis was performed without intravenous contrast   Axial, sagittal, and coronal 2D reformatted images were created from the source data and submitted for interpretation  Radiation dose length product (DLP) for this visit:  710 22 mGy-cm   This examination, like all CT scans performed in the [facility], was performed utilizing techniques to minimize radiation dose exposure, including the use of iterative  reconstruction and automated exposure control  Enteric contrast was not administered  FINDINGS: ABDOMEN LOWER CHEST:  Emphysematous changes are present in the lower lobes of the lungs bilaterally  The heart is enlarged  Coronary artery calcifications  Prior aortic valve replacement  LIVER/BILIARY TREE:  Unremarkable  GALLBLADDER:  Surgically absent  SPLEEN:  Unremarkable  PANCREAS:  Unremarkable  ADRENAL GLANDS:  Unremarkable  KIDNEYS/URETERS:  One or more sharply circumscribed subcentimeter renal hypodensities are noted  These lesions are too small to accurately characterize, but are statistically most likely to represent benign cortical renal cyst(s)  According to the guidelines published in the [facility] Paper of the ACR Incidental Findings Committee (Radiology 2010), no further workup of these lesions is recommended  Renal calculi bilaterally, stable from the prior study  No hydronephrosis or hydroureter  No ureteric calculi  Extensive vascular calcifications in the renal vessels  STOMACH AND BOWEL:  Evaluation limited due to lack of oral contrast material  Stomach decompressed  Hiatal hernia  Small bowel unremarkable  Moderate amount of liquid feces throughout the colon  Correlate for diarrhea  APPENDIX:  Surgically absent   ABDOMINOPELVIC CAVITY:  No ascites or free intraperitoneal air  No lymphadenopathy  VESSELS:  Extensive vascular calcifications  PELVIS REPRODUCTIVE ORGANS:  Surgically absent  URINARY BLADDER:  Unremarkable  ABDOMINAL WALL/INGUINAL REGIONS:  Unremarkable  OSSEOUS STRUCTURES:  No acute fracture or destructive osseous lesion  Degenerative changes  Impression: No acute abdominal pelvic pathology  No significant change from prior  Workstation performed: [id number]       Mri Brain Wo Contrast    Result Date: 4/17/2018  Narrative: MRI BRAIN WITHOUT CONTRAST INDICATION:  expressive aphasia  patient states she knew what she wanted to say but couldn't get the words out-episode occurred yesterday COMPARISON:   4/16/2018 TECHNIQUE:  Sagittal T1, axial T2, axial FLAIR, axial T1, axial [place] and axial diffusion imaging  IMAGE QUALITY:  Diagnostic  FINDINGS: BRAIN PARENCHYMA:  There are at least 4 discrete punctate foci of diffusion restriction including one in the left cerebellum on image 4, series 3  There is a small to moderate-sized cortical infarction in the right occipital lobe on image 13, series 3 in the right posterior cerebral artery territory  More superiorly in the right parietal lobe posteriorly on image 22 series 3 there is a 3rd focus of cortical diffusion restriction in the right MCA territory  A tiny subtle punctate cortical focus in the left frontal lobe anteriorly and inferiorly on image 12 series 3 is noted in the left MCA territory implicating multiple vascular territories with no areas of acute infarction  Elsewhere there is confluent periventricular FLAIR hyperintensity  Striated brainstem and cerebellar hyperintensity noted as well  No acute intracranial hemorrhage  No extra-axial fluid collection  Cerebellar tonsils are normally positioned  VENTRICLES:  Age-appropriate prominence noted  SELLA AND PITUITARY GLAND:  Normal  ORBITS:  Bilateral lens implants noted  PARANASAL SINUSES:  Moderate right sphenoid sinus signal abnormality  Mild bilateral ethmoidal sinus disease  VASCULATURE:  Evaluation of the major intracranial vasculature demonstrates appropriate flow voids  CALVARIUM AND SKULL BASE:  Normal  EXTRACRANIAL SOFT TISSUES:  Normal      Impression: 1   4 punctate foci of diffusion restriction indicative of scattered tiny acute/recent lacunar infarctions in 4 distinct vascular territories implicating a central embolic source  Does the patient have atrial fibrillation? 2  Advanced, chronic microangiopathy  Workstation performed: [id number]     Vas Carotid Complete Study    Result Date: 4/18/2018  Narrative:  [facility] REPORT CLINICAL: Indications:  Patient presents with to evaluate for carotid artery stenosis s/p recent CVA  The patient denies symptoms  Operative History CABG lower extremity a-gram 12/2017  FINDINGS:  Segment      Rig                     Left                      PSV  EDV (cm/s)  Ratio  PSV  EDV (cm/s)  Ratio  Dist  ICA     50          17   0 71   60          20   1 22  Mid  ICA      76          23   1 09   55          18   1 11  Prox  ICA     54          14   0 77   58          16   1 18  Dist CCA      56          11          48          11         Mid CCA       70          14   1 41   50          12   0 78  Prox CCA      50           9          63          12         Ext Carotid  226          28   3 24   55           0   1 11  Prox Vert     73          16          93          19         Subclavian    98           0         105           6            CONCLUSION:  Impression RIGHT: There is <50% stenosis noted in the internal carotid artery  Plaque is heterogenous and irregular  Vertebral artery flow is antegrade  There is no significant subclavian artery disease  LEFT: There is <50% stenosis noted in the internal carotid artery  Plaque is heterogenous and irregular  Vertebral artery flow is antegrade  There is no significant subclavian artery disease    Compared to previous study on 03/18/2018, there is no change  Internal carotid artery stenosis determination by consensus criteria from: Nona Vogt et al  Carotid Artery Stenosis: Gray-Scale and Doppler US Diagnosis - Society of Radiologists in [address] [place] [address] [address], Radiology 2003; [phone]  SIGNATURE: Electronically Signed by: Noé Gutierrez on 2018-04-18 06:57:52 AM    Mri Brain Seizure Wo And W Contrast    Result Date: 5/3/2018  Narrative: MRI  BRAIN  - WITH AND WITHOUT CONTRAST INDICATION: seizure/stroke Seizure disorder  COMPARISON: 4/17/2018, MRI brain without contrast  TECHNIQUE:  Sagittal 3D SPGR, axial T2, axial FLAIR, axial T1, axial [place], coronal T2 and FLAIR  Post-contrast axial T1 , Sagittal 3D SPGR with coronal recons  IV Contrast:  10 mL of gadobutrol injection (MULTI-DOSE) IMAGE QUALITY:   Diagnostic  FINDINGS: BRAIN PARENCHYMA:  Confluent hyperintense T2 and FLAIR signal throughout the cerebral hemispheres extending peripherally  White matter hyperintensity within the brainstem  Mild hyperintense signal noted adjacent to the cerebellar vermis within the posterior fossa  These findings are stable compared to the prior examination most suggestive of chronic microangiopathy  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  Small old lacunar infarcts  No mass, mass effect or midline shift  No hemorrhage  Punctate focus of diffusion and amounted within the right posterior frontal white matter, series 3 image 21 may represent a tiny new infarct  Mild diffuse cerebral volume loss  Mild volume loss of the hippocampal formations, bilaterally symmetric without increased signal  Mild focal right occipital cortical enhancement on series 11 image 13 corresponds to the small infarcts seen on prior exam  VENTRICLES:  The ventricles are enlarged bilaterally consistent with the degree of cerebral volume loss and diffuse white matter change   SELLA AND PITUITARY GLAND:  Normal  ORBITS:  Normal  PARANASAL SINUSES:  Normal  VASCULATURE:  Evaluation of the major intracranial vasculature demonstrates appropriate flow voids  CALVARIUM AND SKULL BASE:  Normal  EXTRACRANIAL SOFT TISSUES:  Normal      Impression: Stable cerebral volume loss and diffuse white matter changes most suggestive of extensive chronic microangiopathy  Enhancement of the previously seen small right occipital lobe infarct  There is a new diffusion abnormality measuring only 2 mm within the right centrum semiovale which may represent a small new focus of ischemia  Workstation performed: [id number]     Video EEG reviewed report - see report in database  No seizure activity seen  VTE Prophylaxis: Xarelto    Counseling / Coordination of Care  Total time spent today 20 minutes  Greater than 50% of total time was spent with the patient and / or family counseling and / or coordination of care   A description of the counseling / coordination of care: floor time, reviewing imaging and records, exam

## 2018-05-03 NOTE — PROGRESS NOTES
Heart Failure Office Visit   Yareli [facility] [de-identified] y o  female MRN: [id number]    HPI  Ms Vance Hobbs is an [de-identified]year old female with a known past medical history of HTN, DM2, Asthma, PVD, HLD, DJD, Renal calculi, hypothyroid sp thyroid surgery, GERD, Lymes disease, diabetic retinopathy, sp PRABHA, sp Laminectomy,  coronary artery disease, status post CABG x 4 in 2004 at [facility], chronic combined systolic/ diastolic heart failure LVEF 35-40% March 2018 improved to 50% in April 4/2018, persistent atrial flutter/atrial fibrillation and now severe aortic stenosis, chronic LBBB  TTE at year showed a normal LV systolic function, severe aortic stenosis and moderate mitral stenosis  She also had moderate to severe LVH  Ms Jeniffer Velasco was seen by CT surgery for possible TAVR  On 4/03/18 Ms Moore was admitted and underwent TAVR 26 mm Celine 3  Tranferred to the ICU hemodynamically stable on no gtts  Intraoperative ROBB showed no paravalvular leak   She went into rapid atrial fibrillation and converted by self to NSR  BB increased  On 4/06/18 She was discharged on Coumadin with INR 1 37 to [facility]  On 4/12/18 I had the pleasure of seeing Ms Moore at [facility] where she is undergoing rehabilitation  Her son is at her bedside  Ms Jeniffer Velasco admits to fatigue  Her weight is 160 pounds  She admits to a non productive cough and orthopnea  She presents with chetna LE edema to thighs  Last INR on 4/11/8 2 4        Patient Active Problem List   Diagnosis    Essential hypertension    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 native heart without angina pectoris    Type 2 diabetes mellitus with complication, with long-term current use of insulin (HCC)    Atrial flutter (HCC)    Hyperlipidemia    Gastroesophageal reflux disease without esophagitis    Moderate mitral stenosis    Arthritis of hand    Acute respiratory failure with hypoxia ([address] )    Asthma    Asymptomatic carotid artery stenosis, bilateral    Atherosclerosis of artery of extremity with ulceration (HCC)    Hx of CABG    Chronic anticoagulation    Chronic combined systolic and diastolic congestive heart failure (HCC)    Degenerative joint disease involving multiple joints    Diabetic retinopathy (HCC)    Postoperative hypothyroidism    Migraine headache    Nephrolithiasis    Osteoarthritis of both knees    Paroxysmal atrial fibrillation (HCC)    Ulcer of toe of left foot (HCC)    Ulcer of toe of right foot (HCC)    Prolonged Q-T interval on ECG    Hypokalemia    Hypocalcemia    Left bundle branch block (LBBB)    1st degree AV block    S/P TAVR (transcatheter aortic valve replacement)    Expressive aphasia    Multiple lacunar infarcts (HCC)    Vomiting    History of CVA (cerebrovascular accident)    Seizure ([facility] [address]    Supratherapeutic INR    NSVT (nonsustained ventricular tachycardia) (HCC)    Pulmonary hypertension (HCC)    Anemia    Severe sepsis (HCC)    Lactic acidosis       ROS- fatigue, non productive cough and chetna LE edema      Objective:   Vitals: 139/52 HR 90 BPM oxygen saturation on RA 99%, weight 160 pounds  There were no vitals filed for this visit  There is no height or weight on file to calculate BMI      Wt Readings from Last 3 Encounters:   05/03/18 75 3 kg (166 lb 0 1 oz)   05/01/18 75 7 kg (166 lb 14 2 oz)   04/28/18 74 8 kg (165 lb)         Physical Exam:  GEN: Yanni Moore appears well, alert and oriented x 3, pleasant and cooperative   HEENT: pupils equal, round, and reactive to light; extraocular muscles intact  NECK: supple, no carotid bruits   HEART: regular rhythm, normal S1 and S2, no murmurs, clicks, gallops or rubs, JVP is    LUNGS: clear to auscultation bilaterally; no wheezes, rales, or rhonchi   ABDOMEN: normal bowel sounds, soft, no tenderness, no distention  EXTREMITIES: peripheral pulses normal; no clubbing, cyanosis, or edema  NEURO: no focal findings   SKIN: normal without suspicious lesions on exposed skin    No current facility-administered medications for this visit  No current outpatient prescriptions on file      Facility-Administered Medications Ordered in Other Visits:     acetaminophen (TYLENOL) rectal suppository 650 mg, 650 mg, Rectal, Q4H PRN, Jeff Schwartz PA-C    acetaminophen (TYLENOL) tablet 650 mg, 650 mg, Oral, Q6H PRN, Marlene Hunter Jayson, DO, 650 mg at 04/30/18 0949    albuterol inhalation solution 2 5 mg, 2 5 mg, Nebulization, Q4H PRN, Bushra Horn MD, 2 5 mg at 05/01/18 0918    aspirin chewable tablet 81 mg, 81 mg, Oral, Daily, Matilde Chambers DO, 81 mg at 05/03/18 0853    atorvastatin (LIPITOR) tablet 80 mg, 80 mg, Oral, Daily With Dinner, Melina Mg MD, 80 mg at 05/02/18 1657    ferrous sulfate tablet 325 mg, 325 mg, Oral, Daily With Breakfast, Max Jean-Baptiste DO, 325 mg at 05/03/18 0853    fluticasone (FLOVENT HFA) 110 MCG/ACT inhaler 1 puff, 1 puff, Inhalation, Q12H [facility], Melina Mg MD, 1 puff at 05/03/18 0854    furosemide (LASIX) injection 40 mg, 40 mg, Intravenous, BID (diuretic), Gilberto Hernandez MD, 40 mg at 05/03/18 0854    influenza inactivated quadrivalent vaccine (FLULAVAL) IM injection 0 5 mL, 0 5 mL, Intramuscular, Prior to discharge, Bushra Horn MD    insulin lispro (HumaLOG) 100 units/mL subcutaneous injection 1-6 Units, 1-6 Units, Subcutaneous, Q6H [facility], 6 Units at 05/03/18 1046 **AND** Fingerstick Glucose (POCT), , , Q6H, Jeff Schwartz PA-C    labetalol (NORMODYNE) injection 10 mg, 10 mg, Intravenous, Q6H PRN, Cori Alexis Bashor, DO, 10 mg at 05/01/18 1625    levETIRAcetam (KEPPRA) tablet 750 mg, 750 mg, Oral, Q12H [facility], RAMO Benton, 750 mg at 05/03/18 0853    levothyroxine tablet 100 mcg, 100 mcg, Oral, Daily, Vilma Bolaños MD, 100 mcg at 05/03/18 0532    loratadine (CLARITIN) tablet 10 mg, 10 mg, Oral, Daily, Vilma Bolaños MD, 10 mg at 05/03/18 0854    LORazepam (ATIVAN) 2 mg/mL injection 2 mg, 2 mg, Intravenous, PRN, Cori Estevez DO    metoclopramide (REGLAN) injection 10 mg, 10 mg, Intravenous, Q6H PRN, Melina Mg MD, 10 mg at 04/29/18 0110    metoprolol tartrate (LOPRESSOR) tablet 100 mg, 100 mg, Oral, Q12H [address], Marleny Tolbert MD, 100 mg at 05/03/18 9595    nystatin (MYCOSTATIN) powder, , Topical, BID, Flaco Estevez DO    omeprazole (PRILOSEC) suspension 2 mg/mL, 20 mg, Oral, Daily, Flaco Estevez DO, 20 mg at 05/02/18 1228    polyethylene glycol (MIRALAX) packet 17 g, 17 g, Oral, Daily PRN, Joe Lemus MD    potassium chloride (K-DUR,KLOR-CON) CR tablet 40 mEq, 40 mEq, Oral, BID, Marleny Tolbert MD, 40 mEq at 05/03/18 5348    rivaroxaban (XARELTO) tablet 20 mg, 20 mg, Oral, Daily With Sandra Carrillo DO, 20 mg at 05/02/18 1657    senna-docusate sodium (SENOKOT S) 8 6-50 mg per tablet 1 tablet, 1 tablet, Oral, HS, Ashlie James PA-C, 1 tablet at 05/02/18 2110      Labs & Results:      Results from last 7 days  Lab Units 05/01/18  1621 04/28/18  0913   TROPONIN I ng/mL 0 03 0 03     Results from last 7 days  Lab Units 05/02/18  0452 05/01/18  1621 05/01/18  0533   WBC Thousand/uL 8 28 14 78* 7 35   HEMOGLOBIN g/dL 7 9* 9 0* 8 4*   HEMATOCRIT % 26 9* 29 9* 28 4*   PLATELETS Thousands/uL 218 318 239           Results from last 7 days  Lab Units 05/03/18  0458 05/02/18  1804 05/02/18  0452 05/01/18  1757  04/29/18  1029  04/29/18  0450   SODIUM mmol/L 142  --  143 140  < > 141  --  139   POTASSIUM mmol/L 4 0 4 1 3 3* 4 0  < > 3 7  --  3 5   CHLORIDE mmol/L 106  --  107 106  < > 106  --  108   CO2 mmol/L 30  --  28 26  < > 11*  --  15*   BUN mg/dL 11  --  11 12  < > 14  --  12   CREATININE mg/dL 0 69  --  0 62 0 85  < > 1 13  --  0 82   CALCIUM mg/dL 7 4*  --  7 1* 7 0*  < > 6 3*  --  5 9*   TOTAL PROTEIN g/dL  --   --   --  6 8  --  7 5  --  7 3   BILIRUBIN TOTAL mg/dL  --   --   --  0 79  --  0 83  --  0 81   ALK PHOS U/L  --   --   --  95  --  73  --  70   ALT U/L  --   --   --  23  --  21  --  19 AST U/L  --   --   --  44  --  43  --  30   GLUCOSE RANDOM mg/dL 167*  --  125 315*  < > 204*  --  132   GLUCOSE, ISTAT   --   --   --   --   --   --   < >  --    < > = values in this interval not displayed  Results from last 7 days  Lab Units 05/01/18  1757 05/01/18  0542 04/30/18  0540   INR  1 75* 1 61* 3 23*           Assessment/Plan:  1  Severe AS sp TAVR 26 mm Celine 3  2  Paroxysmal atrial fibrillation- RRR, Metoprolol 100mg Q12 hours, continue on Coumadin goal INR 2 0-3 0 last INR on 4/11/8 2 4  3  Acute on chronic diastolic heart failure- previous LVEF 35% improved to 50%, weight 160 pounds, + chetna LE edema, BP and HR controlled,  increase Lasix to 80mg BID for 2 days, K Dur 20meq BID for 2  Days  Then return back to Lasix 40mg BID and K dur 10meq BID, Continue on Lisinopril 2 5mg daily and Metoprolol tartrate 100mg Q12 hours  Maintain a 2 gram daily sodium diet and 1500 ml daily fluid restriction  Check daily weights  If gain 3 pounds in one day, 5 pounds in one week, or experience worsening shortness of breath or increasing lower leg swelling    Please call the heart failure office at [phone]    4  CAD sp CABG complete occlusion of native Coronary anatomy, occlusion of 2 vein grafts, continue medical management,      RAMO Iglesias  5/3/2018  2:41 PM

## 2018-05-04 LAB
ALBUMIN SERPL BCP-MCNC: 2.5 G/DL (ref 3.5–5)
ALP SERPL-CCNC: 75 U/L (ref 46–116)
ALT SERPL W P-5'-P-CCNC: 21 U/L (ref 12–78)
ANION GAP SERPL CALCULATED.3IONS-SCNC: 8 MMOL/L (ref 4–13)
AST SERPL W P-5'-P-CCNC: 23 U/L (ref 5–45)
BASOPHILS # BLD AUTO: 0.02 THOUSANDS/ΜL (ref 0–0.1)
BASOPHILS NFR BLD AUTO: 0 % (ref 0–1)
BILIRUB SERPL-MCNC: 0.55 MG/DL (ref 0.2–1)
BUN SERPL-MCNC: 12 MG/DL (ref 5–25)
CALCIUM SERPL-MCNC: 7.5 MG/DL (ref 8.3–10.1)
CHLORIDE SERPL-SCNC: 103 MMOL/L (ref 100–108)
CO2 SERPL-SCNC: 31 MMOL/L (ref 21–32)
CREAT SERPL-MCNC: 0.9 MG/DL (ref 0.6–1.3)
EOSINOPHIL # BLD AUTO: 0.35 THOUSAND/ΜL (ref 0–0.61)
EOSINOPHIL NFR BLD AUTO: 5 % (ref 0–6)
ERYTHROCYTE [DISTWIDTH] IN BLOOD BY AUTOMATED COUNT: 19 % (ref 11.6–15.1)
GFR SERPL CREATININE-BSD FRML MDRD: 61 ML/MIN/1.73SQ M
GLUCOSE SERPL-MCNC: 192 MG/DL (ref 65–140)
GLUCOSE SERPL-MCNC: 224 MG/DL (ref 65–140)
GLUCOSE SERPL-MCNC: 225 MG/DL (ref 65–140)
GLUCOSE SERPL-MCNC: 274 MG/DL (ref 65–140)
GLUCOSE SERPL-MCNC: 292 MG/DL (ref 65–140)
GLUCOSE SERPL-MCNC: 367 MG/DL (ref 65–140)
HCT VFR BLD AUTO: 32.3 % (ref 34.8–46.1)
HGB BLD-MCNC: 9.5 G/DL (ref 11.5–15.4)
LYMPHOCYTES # BLD AUTO: 1.77 THOUSANDS/ΜL (ref 0.6–4.47)
LYMPHOCYTES NFR BLD AUTO: 26 % (ref 14–44)
MAGNESIUM SERPL-MCNC: 1 MG/DL (ref 1.6–2.6)
MCH RBC QN AUTO: 20.7 PG (ref 26.8–34.3)
MCHC RBC AUTO-ENTMCNC: 29.4 G/DL (ref 31.4–37.4)
MCV RBC AUTO: 70 FL (ref 82–98)
MONOCYTES # BLD AUTO: 0.6 THOUSAND/ΜL (ref 0.17–1.22)
MONOCYTES NFR BLD AUTO: 9 % (ref 4–12)
NEUTROPHILS # BLD AUTO: 4 THOUSANDS/ΜL (ref 1.85–7.62)
NEUTS SEG NFR BLD AUTO: 60 % (ref 43–75)
NRBC BLD AUTO-RTO: 0 /100 WBCS
PLATELET # BLD AUTO: 277 THOUSANDS/UL (ref 149–390)
PMV BLD AUTO: 9.7 FL (ref 8.9–12.7)
POTASSIUM SERPL-SCNC: 4 MMOL/L (ref 3.5–5.3)
PROT SERPL-MCNC: 6.5 G/DL (ref 6.4–8.2)
RBC # BLD AUTO: 4.6 MILLION/UL (ref 3.81–5.12)
SODIUM SERPL-SCNC: 142 MMOL/L (ref 136–145)
WBC # BLD AUTO: 6.77 THOUSAND/UL (ref 4.31–10.16)

## 2018-05-04 PROCEDURE — 83735 ASSAY OF MAGNESIUM: CPT | Performed by: INTERNAL MEDICINE

## 2018-05-04 PROCEDURE — 82948 REAGENT STRIP/BLOOD GLUCOSE: CPT

## 2018-05-04 PROCEDURE — 92526 ORAL FUNCTION THERAPY: CPT

## 2018-05-04 PROCEDURE — 99233 SBSQ HOSP IP/OBS HIGH 50: CPT | Performed by: INTERNAL MEDICINE

## 2018-05-04 PROCEDURE — 97535 SELF CARE MNGMENT TRAINING: CPT

## 2018-05-04 PROCEDURE — 97110 THERAPEUTIC EXERCISES: CPT

## 2018-05-04 PROCEDURE — 97116 GAIT TRAINING THERAPY: CPT

## 2018-05-04 PROCEDURE — 80053 COMPREHEN METABOLIC PANEL: CPT | Performed by: INTERNAL MEDICINE

## 2018-05-04 PROCEDURE — 99232 SBSQ HOSP IP/OBS MODERATE 35: CPT | Performed by: INTERNAL MEDICINE

## 2018-05-04 PROCEDURE — 85025 COMPLETE CBC W/AUTO DIFF WBC: CPT | Performed by: INTERNAL MEDICINE

## 2018-05-04 PROCEDURE — 97530 THERAPEUTIC ACTIVITIES: CPT

## 2018-05-04 PROCEDURE — 94760 N-INVAS EAR/PLS OXIMETRY 1: CPT

## 2018-05-04 RX ORDER — AMIODARONE HYDROCHLORIDE 200 MG/1
200 TABLET ORAL 2 TIMES DAILY WITH MEALS
Status: DISCONTINUED | OUTPATIENT
Start: 2018-05-04 | End: 2018-05-07 | Stop reason: HOSPADM

## 2018-05-04 RX ORDER — AMIODARONE HYDROCHLORIDE 200 MG/1
200 TABLET ORAL
Status: DISCONTINUED | OUTPATIENT
Start: 2018-05-19 | End: 2018-05-07 | Stop reason: HOSPADM

## 2018-05-04 RX ORDER — FUROSEMIDE 40 MG/1
40 TABLET ORAL
Status: DISCONTINUED | OUTPATIENT
Start: 2018-05-05 | End: 2018-05-07 | Stop reason: HOSPADM

## 2018-05-04 RX ORDER — FUROSEMIDE 10 MG/ML
40 INJECTION INTRAMUSCULAR; INTRAVENOUS
Status: DISCONTINUED | OUTPATIENT
Start: 2018-05-04 | End: 2018-05-04

## 2018-05-04 RX ORDER — INSULIN GLARGINE 100 [IU]/ML
20 INJECTION, SOLUTION SUBCUTANEOUS
Status: DISCONTINUED | OUTPATIENT
Start: 2018-05-04 | End: 2018-05-05

## 2018-05-04 RX ADMIN — NYSTATIN: 100000 POWDER TOPICAL at 18:46

## 2018-05-04 RX ADMIN — AMIODARONE HYDROCHLORIDE 200 MG: 200 TABLET ORAL at 18:45

## 2018-05-04 RX ADMIN — INSULIN LISPRO 2 UNITS: 100 INJECTION, SOLUTION INTRAVENOUS; SUBCUTANEOUS at 17:11

## 2018-05-04 RX ADMIN — NYSTATIN: 100000 POWDER TOPICAL at 09:32

## 2018-05-04 RX ADMIN — POTASSIUM CHLORIDE 40 MEQ: 1500 TABLET, EXTENDED RELEASE ORAL at 09:31

## 2018-05-04 RX ADMIN — INSULIN LISPRO 2 UNITS: 100 INJECTION, SOLUTION INTRAVENOUS; SUBCUTANEOUS at 07:33

## 2018-05-04 RX ADMIN — FLUTICASONE PROPIONATE 1 PUFF: 110 AEROSOL, METERED RESPIRATORY (INHALATION) at 09:32

## 2018-05-04 RX ADMIN — FUROSEMIDE 40 MG: 10 INJECTION, SOLUTION INTRAMUSCULAR; INTRAVENOUS at 09:31

## 2018-05-04 RX ADMIN — LORATADINE 10 MG: 10 TABLET ORAL at 09:31

## 2018-05-04 RX ADMIN — Medication 1 TABLET: at 21:18

## 2018-05-04 RX ADMIN — Medication 325 MG: at 09:31

## 2018-05-04 RX ADMIN — LEVETIRACETAM 750 MG: 750 TABLET ORAL at 21:18

## 2018-05-04 RX ADMIN — RIVAROXABAN 20 MG: 20 TABLET, FILM COATED ORAL at 18:45

## 2018-05-04 RX ADMIN — FLUTICASONE PROPIONATE 1 PUFF: 110 AEROSOL, METERED RESPIRATORY (INHALATION) at 21:18

## 2018-05-04 RX ADMIN — ATORVASTATIN CALCIUM 80 MG: 80 TABLET, FILM COATED ORAL at 18:45

## 2018-05-04 RX ADMIN — METOPROLOL TARTRATE 100 MG: 50 TABLET ORAL at 09:31

## 2018-05-04 RX ADMIN — ASPIRIN 81 MG 81 MG: 81 TABLET ORAL at 09:31

## 2018-05-04 RX ADMIN — INSULIN LISPRO 4 UNITS: 100 INJECTION, SOLUTION INTRAVENOUS; SUBCUTANEOUS at 13:36

## 2018-05-04 RX ADMIN — POTASSIUM CHLORIDE 40 MEQ: 1500 TABLET, EXTENDED RELEASE ORAL at 18:45

## 2018-05-04 RX ADMIN — LEVETIRACETAM 750 MG: 750 TABLET ORAL at 09:31

## 2018-05-04 RX ADMIN — Medication 20 MG: at 09:37

## 2018-05-04 RX ADMIN — INSULIN GLARGINE 20 UNITS: 100 INJECTION, SOLUTION SUBCUTANEOUS at 21:18

## 2018-05-04 RX ADMIN — LEVOTHYROXINE SODIUM 100 MCG: 100 TABLET ORAL at 06:26

## 2018-05-04 RX ADMIN — INSULIN LISPRO 2 UNITS: 100 INJECTION, SOLUTION INTRAVENOUS; SUBCUTANEOUS at 00:14

## 2018-05-04 RX ADMIN — METOPROLOL TARTRATE 100 MG: 50 TABLET ORAL at 21:18

## 2018-05-04 NOTE — SPEECH THERAPY NOTE
Speech Language/Pathology    Speech/Language Pathology Progress Note    Patient Name: Barb Davison  PMNVA'T Date: 5/4/2018     Problem List  Patient Active Problem List   Diagnosis    Essential hypertension    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 native heart without angina pectoris    Type 2 diabetes mellitus with complication, with long-term current use of insulin (HCC)    Atrial flutter (HCC)    Hyperlipidemia    Gastroesophageal reflux disease without esophagitis    Moderate mitral stenosis    Arthritis of hand    Acute respiratory failure with hypoxia ([address] )    Asthma    Asymptomatic carotid artery stenosis, bilateral    Atherosclerosis of artery of extremity with ulceration ([facility] [address] )    Hx of CABG    Chronic anticoagulation    Chronic combined systolic and diastolic congestive heart failure (HCC)    Degenerative joint disease involving multiple joints    Diabetic retinopathy (HCC)    Postoperative hypothyroidism    Migraine headache    Nephrolithiasis    Osteoarthritis of both knees    Paroxysmal atrial fibrillation (HCC)    Ulcer of toe of left foot (HCC)    Ulcer of toe of right foot (HCC)    Prolonged Q-T interval on ECG    Hypokalemia    Hypocalcemia    Left bundle branch block (LBBB)    1st degree AV block    S/P TAVR (transcatheter aortic valve replacement)    Expressive aphasia    Multiple lacunar infarcts (HCC)    Vomiting    History of CVA (cerebrovascular accident)    Seizure ([facility] [address] )    Supratherapeutic INR    NSVT (nonsustained ventricular tachycardia) (HCC)    Pulmonary hypertension (HCC)    Anemia    Severe sepsis (HCC)    Lactic acidosis        Past Medical History  Past Medical History:   Diagnosis Date    Altered mental status     Anticoagulated     Coumadin for Aflutter    Asthma     Atrial flutter (HCC)     maintained on coumadin anticoag    CAD (coronary artery disease)     Candidiasis     Carotid stenosis, bilateral     Cataract     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) ([facility])     Chronic fatigue syndrome     Chronic low back pain     Concussion w loss of consciousness of unsp duration, init     Coronary artery disease     CVA (cerebral vascular accident) ([facility] [address] ) 4/17/2018    Diabetes mellitus ([facility] )     type 2, insulin dependent    DJD (degenerative joint disease)     GERD (gastroesophageal reflux disease)     Herpes zoster     HLD (hyperlipidemia)     HTN (hypertension)     Hyperlipidemia     Hypothyroidism     Irritable bowel syndrome     Lumbar radiculopathy     Lyme disease     Dx in hospital 8/2011    Lyme disease     MI (myocardial infarction) ([facility] [address] )     Migraines     Muscle spasm     Non-neoplastic nevus     Nontoxic multinodular goiter     OA (osteoarthritis)     Osteoarthritis     Other chronic pain     PAD (peripheral artery disease) ([facility])     Palpitations     Pseudogout     Spinal stenosis     Transient cerebral ischemia     Trigger ring finger     Viral gastroenteritis         Past Surgical History  Past Surgical History:   Procedure Laterality Date    APPENDECTOMY      ARTERIOGRAM  12/19/2017    CARDIAC CATHETERIZATION      CHOLECYSTECTOMY      COLONOSCOPY      CORONARY ARTERY BYPASS GRAFT  2004    LUMBAR LAMINECTOMY      WA ECHO TRANSESOPHAG R-T 2D W/PRB IMG ACQUISJ I&R N/A 4/3/2018    Procedure: TRANSESOPHAGEAL ECHOCARDIOGRAM (ROBB);   Surgeon: Cheryle Paulino DO;  Location: [facility];  Service: Cardiac Surgery    WA REPLACE AORTIC VALVE OPENFEMORAL ARTERY APPROACH N/A 4/3/2018    Procedure: REPLACEMENT AORTIC VALVE TRANSCATHETER (TAVR) TRANSFEMORAL w/ 26MM IVY YEVGENIY S3 VALVE;  Surgeon: Cheryle Paulino DO;  Location: [facility];  Service: Cardiac Surgery    THYROIDECTOMY      TOTAL ABDOMINAL HYSTERECTOMY W/ BILATERAL SALPINGOOPHORECTOMY           Subjective:    Pt was seen for dysphagia treatment to assess for swallowing safety and possible diet advancement  Pt stated "the food here is really really good "    Objective:    New MRI: "Enhancement of the previously seen small right occipital lobe infarct  There is a new diffusion abnormality measuring only 2 mm within the right centrum semiovale which may represent a small new focus of ischemia "    Pt was assessed with lunch meal of minced chicken and broccoli, mashed potatoes, 3 oz HTL by straw, trial of 4 oz NTL by straw, trial of 2 oz thin water by straw  Limited sample sizes due to fluid restriction  Pt demonstrated functional mastication of level 2 textures  She still does not have dentures present therefore suspect this is most appropriate diet for now  She stated "I like it like this I didn't bring my teeth here " No s/s of pharyngeal dysphagia noted with solids  No s/s of aspiration noted with HTL  With successive sips of NTL by straw, no s/s of aspiration noted  Voice remained clear post all swallows  With trial of thin water pt demonstrated one delayed moist cough  Assessment:    Pt tolerated dysphagia level 2 textures, HTL and NTL trials today without s/s of aspiration or dysphagia  Solids are unable to be advanced as pt does not have dentures here  She had a delayed cough with thin water      Plan/Recommendations:    Consider advancing diet to dysphagia level 2 with NECTAR thick liquids  Small bites/sips, slow rate  Speech to follow

## 2018-05-04 NOTE — OCCUPATIONAL THERAPY NOTE
[address] Progress Note     Patient Name: Barb Davison  VKQGU'E Date: 5/4/2018  Problem List  Patient Active Problem List   Diagnosis    Essential hypertension    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 native heart without angina pectoris    Type 2 diabetes mellitus with complication, with long-term current use of insulin (HCC)    Atrial flutter (HCC)    Hyperlipidemia    Gastroesophageal reflux disease without esophagitis    Moderate mitral stenosis    Arthritis of hand    Acute respiratory failure with hypoxia ([facility] [address] )    Asthma    Asymptomatic carotid artery stenosis, bilateral    Atherosclerosis of artery of extremity with ulceration ([facility] [address] )    Hx of CABG    Chronic anticoagulation    Chronic combined systolic and diastolic congestive heart failure (HCC)    Degenerative joint disease involving multiple joints    Diabetic retinopathy (HCC)    Postoperative hypothyroidism    Migraine headache    Nephrolithiasis    Osteoarthritis of both knees    Paroxysmal atrial fibrillation (HCC)    Ulcer of toe of left foot (HCC)    Ulcer of toe of right foot (HCC)    Prolonged Q-T interval on ECG    Hypokalemia    Hypocalcemia    Left bundle branch block (LBBB)    1st degree AV block    S/P TAVR (transcatheter aortic valve replacement)    Expressive aphasia    Multiple lacunar infarcts (HCC)    Vomiting    History of CVA (cerebrovascular accident)    Seizure ([facility] [address] )    Supratherapeutic INR    NSVT (nonsustained ventricular tachycardia) (HCC)    Pulmonary hypertension (HCC)    Anemia    Severe sepsis (HCC)    Lactic acidosis        05/04/18 1258   Restrictions/Precautions   Weight Bearing Precautions Per Order No   Other Precautions Cognitive; Chair Alarm; Fall Risk;Telemetry   Pain Assessment   Pain Assessment No/denies pain   Pain Score No Pain   ADL   Grooming Assistance 5  Supervision/Setup  (close (S))   Grooming Deficit Supervision/safety;Verbal cueing;Wash/dry face  (oral care)   LB Dressing Assistance 4  Minimal Assistance   LB Dressing Deficit Setup;Steadying;Verbal cueing;Supervision/safety; Increased time to complete  (donned undergarment and pants)   Toileting Assistance  4  Minimal Assistance   Toileting Deficit Steadying;Verbal cueing;Supervison/safety; Increased time to complete   Toileting Comments Pt  attempted to stand during toileting without callign for OT, OT watching from behind door, this demonstrates decreased STM and decreased safety awareeness   Transfers   Sit to Stand 4  Minimal assistance   Additional items Assist x 1   Stand to Sit 5  Supervision   Additional items Verbal cues   Toilet transfer 4  Minimal assistance   Additional items Assist x 1;Standard toilet  (+GB, min A to lower to toilet)   Functional Mobility   Functional Mobility 4  Minimal assistance   Additional Comments ~60' and t/o room/bathoom, scizzoring gait noted on turns, with minimal carryover of education to widen MEGAN   Additional items Rolling walker   Cognition   Overall Cognitive Status Impaired   Arousal/Participation Alert; Cooperative   Attention Attends with cues to redirect   Memory Decreased short term memory;Decreased recall of precautions   Following Commands Follows one step commands with increased time or repetition   Activity Tolerance   Activity Tolerance Patient limited by fatigue  (+ IGNACIO noted with activity)   Medical Staff Made Aware yes, RN cleared for session   Assessment   Assessment Pt  seen for OT treatment session to address LB dressing, grooming, toileting, mobility and transfers  Pt  pleasant and agreeable to participate and overall, pt  tolerated session well  Pt  functioning at a min A level for functional mobility and a min A-(S) level for transfers with the use of RW   A was needed d/t: decreased functional activity tolerance, generalized weakness, deconditioning, decreased balance, +IGNACIO, scissoring noted around turns, increased distractibility,  decreased STM, decreased safety/insight/judgment and awareness into deficits  Min A was also needed for steadying during toileting and LB dressing as pt  Presented with decreased dynamic balance however only (S) was required for grooming as pt  Utilized UE support on sink  Pt  will continue to benefit from OT services to address noted deficits and maximize functional gains  Continue to rec  STR s/p d c    Plan   Treatment Interventions ADL retraining;Functional transfer training; Endurance training;Cognitive reorientation;Patient/family training;Equipment evaluation/education; Compensatory technique education;Continued evaluation; Energy conservation; Activityengagement   Goal Expiration Date 05/15/18   Treatment Day 1   OT Frequency 3-5x/wk   Recommendation   OT Discharge Recommendation Short Term Rehab   OT - OK to Discharge Yes   Modified Riner Scale   Modified Rae Scale 4    Mauri Samuels, OTR/L

## 2018-05-04 NOTE — PLAN OF CARE
Problem: OCCUPATIONAL THERAPY ADULT  Goal: Performs self-care activities at highest level of function for planned discharge setting  See evaluation for individualized goals  Treatment Interventions: ADL retraining, Functional transfer training, Endurance training, Cognitive reorientation, Patient/family training, Equipment evaluation/education, Compensatory technique education, Continued evaluation, Energy conservation, Activityengagement          See flowsheet documentation for full assessment, interventions and recommendations  Limitation: Decreased ADL status, Decreased Safe judgement during ADL, Decreased cognition, Decreased endurance, Decreased self-care trans, Decreased high-level ADLs  Prognosis: Fair  Assessment: Pt  seen for OT treatment session to address LB dressing, grooming, toileting, mobility and transfers  Pt  pleasant and agreeable to participate and overall, pt  tolerated session well  Pt  functioning at a min A level for functional mobility and a min A-(S) level for transfers with the use of RW  A was needed d/t: decreased functional activity tolerance, generalized weakness, deconditioning, decreased balance, +IGNACIO, scissoring noted around turns, increased distractibility,  decreased STM, decreased safety/insight/judgment and awareness into deficits  Min A was also needed for steadying during toileting and LB dressing as pt  Presented with decreased dynamic balance however only (S) was required for grooming as pt  Utilized UE support on sink  Pt  will continue to benefit from OT services to address noted deficits and maximize functional gains  Continue to rec  STR s/p d c      OT Discharge Recommendation: Short Term Rehab  OT - OK to Discharge:  Yes

## 2018-05-04 NOTE — PROGRESS NOTES
Residency Progress Note   Unit/Bed#: [facility] 707-01 Encounter: [id number]  SOD Team C       Gabrielle Davis [de-identified] y o  female [id number]    Assessment/Plan:    Active Problems:    Essential hypertension    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 native heart without angina pectoris    Type 2 diabetes mellitus with complication, with long-term current use of insulin (HCC)    Atrial flutter (HCC)    Hyperlipidemia    Gastroesophageal reflux disease without esophagitis    Moderate mitral stenosis    Acute respiratory failure with hypoxia (HCC)    Asthma    Asymptomatic carotid artery stenosis, bilateral    Atherosclerosis of artery of extremity with ulceration (HCC)    Hx of CABG    Chronic anticoagulation    Chronic combined systolic and diastolic congestive heart failure (HCC)    Postoperative hypothyroidism    Paroxysmal atrial fibrillation (HCC)    Hypokalemia    Hypocalcemia    Left bundle branch block (LBBB)    S/P TAVR (transcatheter aortic valve replacement)    Expressive aphasia    Multiple lacunar infarcts ([facility])    Vomiting    History of CVA (cerebrovascular accident)    Seizure ([facility] [address] )    Supratherapeutic INR    NSVT (nonsustained ventricular tachycardia) ([facility])    Pulmonary hypertension ([facility])    Anemia    Severe sepsis ([facility])    Lactic acidosis      Nonsustained ventricular tachycardia: This is likely due to cardiomyopathy  Patient has underlying ejection fraction of 35%  Continue to replete electrolytes  -patient's Lopressor has been titrated back to home dose of 100 mg b i d    -Continues to have episodes of NSVT with longest run of 15 beats early this morning  She is asymptomatic, Optimize electrolytes  Chronic combined systolic and diastolic heart failure with ejection fraction 35%:   -continue intravenous diuresis    His previously believe that the patient had an underlying pneumonia, however her respiratory distress was likely secondary to her underlying heart failure and volume overload  New Onset Seizure:    -Patient had EMU which did not show any electrographic evidence of seizure  -MRI shows Stable cerebral volume loss and diffuse white matter changes most suggestive of extensive chronic microangiopathy  Enhancement of the previously seen small right occipital lobe infarct  There is a new diffusion abnormality measuring only 2 mm within the right centrum semiovale which may represent a small new focus of ischemia    -Seizure precautions   -continue Keppra, appreciate Neurology recommendations  cardioembolic stroke:    -previously failed Coumadin and transitioned to Xarelto     -continue Xarelto   -after discussing with Neurology there is concern for underlying hypercoagulable disorder, and they have recommended CT scan to evaluate for possible underlying malignancy  After reviewing the patient's medical chart, she did have a CTA of the chest, abdomen, pelvis with contrast back in March 2018 while undergoing workup of her TAVR, at that time there is no mention of malignancy  I discussed this with Radiology who states that CTA should be sufficient to evaluate for malignancy overall, though underlying liver malignancy is suboptimally viewed with a CTA due to phasing of contrast     Atrial fibrillation/flutter:  Continue Xarelto and Lopressor  CAD: continue Lipitor, resume ASA, Lopressor  HTN:  BP from morning not yet charted  Will follow up  DM2:  Goal blood glucose less than 180, A1c was 7 7% April 2018    -continue sliding scale insulin   -I have restarted the patient's Lantus, though in a decreased dose of 20 units q h s  COPD: No acute exacerbation     -continue Flovent and albuterol  Hypothyroidism:  Continue Synthroid    Disposition:  Discuss need for CT chest abdomen pelvis with Neurology      Subjective:   Patient had multiple episodes of an SVT on telemetry over night    When asked about this she had no symptoms, she denies any palpitations, chest pain, shortness of breath, lightheadedness or dizziness overnight, she feels well this morning without any complaints  Denies any fevers, chills, she had well-formed bowel movement yesterday  In regards to her contrast allergy she states that she had chest pain in the past when she receive contrast   She is alert and oriented to person place and time, is able to tell me who the president is  Vitals: Temp (24hrs), Av 4 °F (36 9 °C), Min:97 9 °F (36 6 °C), Max:98 7 °F (37 1 °C)  Current: Temperature: 98 5 °F (36 9 °C)  Vitals:    18 1531 18 1922 18 2123 18 2343   BP: 162/81 154/92 146/75 142/81   BP Location: Right arm Left arm  Left arm   Pulse: 82 97 86 79   Resp:    Temp: 98 4 °F (36 9 °C) 98 7 °F (37 1 °C)  98 5 °F (36 9 °C)   TempSrc: Oral Oral  Oral   SpO2: 97% 96%  98%   Weight:       Height:        Body mass index is 28 49 kg/m²  I/O last 24 hours: In: 240 [P O :240]  Out: 1200 [Urine:1200]      Physical Exam:   General: No acute distress, resting comfortably  HEENT: Normocephalic, atraumatic, EOMI, no scleral icterus  CV: RRR, +s1,s2, no MRG  Lungs: CTA B/L, no rales, rhonchi or wheezes  Abd: Soft, non-tender, non-distended, +BSx4, no rebound or guarding, no visible echymosses  Ext: No clubbing, cyanosis, or edema  Neuro: CN 2-12 intact, speech fluent, 5/5 UE/LE muscle strength, R sided pronator drift       Invasive Devices     Peripheral Intravenous Line            Peripheral IV 18 Right Wrist less than 1 day                          Labs:   Recent Results (from the past 24 hour(s))   Fingerstick Glucose (POCT)    Collection Time: 18 10:30 AM   Result Value Ref Range    POC Glucose 455 (H) 65 - 140 mg/dl   Fingerstick Glucose (POCT)    Collection Time: 18  4:27 PM   Result Value Ref Range    POC Glucose 210 (H) 65 - 140 mg/dl   Fingerstick Glucose (POCT)    Collection Time: 18  9:11 PM   Result Value Ref Range    POC Glucose 266 (H) 65 - 140 mg/dl   Fingerstick Glucose (POCT)    Collection Time: 05/04/18 12:12 AM   Result Value Ref Range    POC Glucose 192 (H) 65 - 140 mg/dl   Fingerstick Glucose (POCT)    Collection Time: 05/04/18  6:25 AM   Result Value Ref Range    POC Glucose 225 (H) 65 - 140 mg/dl       Radiology Results: I have personally reviewed pertinent reports  Other Diagnostic Testing:   I have personally reviewed pertinent reports          Active Meds:   Current Facility-Administered Medications   Medication Dose Route Frequency    acetaminophen (TYLENOL) rectal suppository 650 mg  650 mg Rectal Q4H PRN    acetaminophen (TYLENOL) tablet 650 mg  650 mg Oral Q6H PRN    albuterol inhalation solution 2 5 mg  2 5 mg Nebulization Q4H PRN    aspirin chewable tablet 81 mg  81 mg Oral Daily    atorvastatin (LIPITOR) tablet 80 mg  80 mg Oral Daily With Dinner    ferrous sulfate tablet 325 mg  325 mg Oral Daily With Breakfast    fluticasone (FLOVENT HFA) 110 MCG/ACT inhaler 1 puff  1 puff Inhalation Q12H [facility]    furosemide (LASIX) injection 40 mg  40 mg Intravenous BID (diuretic)    influenza inactivated quadrivalent vaccine (FLULAVAL) IM injection 0 5 mL  0 5 mL Intramuscular Prior to discharge    insulin lispro (HumaLOG) 100 units/mL subcutaneous injection 1-6 Units  1-6 Units Subcutaneous Q6H [facility]    labetalol (NORMODYNE) injection 10 mg  10 mg Intravenous Q6H PRN    levETIRAcetam (KEPPRA) tablet 750 mg  750 mg Oral Q12H [facility]    levothyroxine tablet 100 mcg  100 mcg Oral Daily    loratadine (CLARITIN) tablet 10 mg  10 mg Oral Daily    LORazepam (ATIVAN) 2 mg/mL injection 2 mg  2 mg Intravenous PRN    metoclopramide (REGLAN) injection 10 mg  10 mg Intravenous Q6H PRN    metoprolol tartrate (LOPRESSOR) tablet 100 mg  100 mg Oral Q12H [facility]    nystatin (MYCOSTATIN) powder   Topical BID    omeprazole (PRILOSEC) suspension 2 mg/mL  20 mg Oral Daily    polyethylene glycol (MIRALAX) packet 17 g  17 g Oral Daily PRN    potassium chloride (K-DUR,KLOR-CON) CR tablet 40 mEq  40 mEq Oral BID    rivaroxaban (XARELTO) tablet 20 mg  20 mg Oral Daily With Dinner    senna-docusate sodium (SENOKOT S) 8 6-50 mg per tablet 1 tablet  1 tablet Oral HS         VTE Pharmacologic Prophylaxis:  Xarelto  VTE Mechanical Prophylaxis: sequential compression device    Debbie Lemus DO

## 2018-05-04 NOTE — PHYSICAL THERAPY NOTE
Physical Therapy Progress Note     05/04/18 1237   Pain Assessment   Pain Assessment No/denies pain   Pain Score No Pain   Restrictions/Precautions   Weight Bearing Precautions Per Order No   Other Precautions Cognitive; Chair Alarm; Fall Risk;Telemetry   General   Family/Caregiver Present No   Cognition   Overall Cognitive Status Impaired   Arousal/Participation Alert; Responsive   Subjective   Subjective Pt agrees to participate   Transfers   Sit to Stand 4  Minimal assistance   Additional items Assist x 1;Verbal cues   Stand to Sit 4  Minimal assistance   Additional items Assist x 1;Verbal cues   Stand pivot 4  Minimal assistance   Additional items Assist x 1   Toilet transfer 4  Minimal assistance   Additional items Assist x 1;Verbal cues   Ambulation/Elevation   Gait pattern Decreased foot clearance; Foward flexed  (slow)   Gait Assistance 4  Minimal assist   Additional items Assist x 1;Verbal cues   Assistive Device Rolling walker   Distance 160 feet and 75 feet x2   Stair Management Assistance Not tested   Balance   Static Sitting Fair   Dynamic Sitting Poor +   Static Standing Poor +   Dynamic Standing Poor   Ambulatory Poor   Endurance Deficit   Endurance Deficit Yes   Endurance Deficit Description IGNACIO, fatigue   Activity Tolerance   Activity Tolerance Patient limited by fatigue   Nurse [address] to see per RN Manas Bearded   Exercises   TKR Sitting;20 reps;AROM; Bilateral   Assessment   Prognosis Fair   Problem List Decreased strength;Decreased endurance; Impaired balance;Decreased cognition;Decreased coordination;Decreased mobility; Impaired judgement;Decreased safety awareness   Assessment Pt is making steady progress with functional mobility  Improved activity tolerance today with gait training as well as less assistance required for transfers  Loss of balance today with sit to stand requiring min assist from retropulsion    Pt would benefit from continued physical therapy to maximize functional mobility and safety  Goals   Patient Goals None stated   STG Expiration Date 05/15/18   Treatment Day 1   Plan   Treatment/Interventions Functional transfer training;LE strengthening/ROM; Therapeutic exercise; Endurance training;Cognitive reorientation;Patient/family training;Bed mobility;Gait training   Progress Progressing toward goals   PT Frequency 5x/wk   Recommendation   Recommendation (Rehab)   Equipment Recommended Obie Sharp PTA

## 2018-05-04 NOTE — RESTORATIVE TECHNICIAN NOTE
Restorative Specialist Mobility Note       Activity: Ambulate in morrison, Ambulate in room, Bathroom privileges, Chair, Dangle, Stand at bedside (Educated/encouraged pt to ambulate with assistance 3-4 x's/day  Chair alarm on   Pt callbell, phone/tray within reach )     Assistive Device: Front wheel walker       Josh PERALTA, Restorative Technician, [employer] [facility]

## 2018-05-04 NOTE — PROGRESS NOTES
Progress Note - Cardiology   Vilma Kearney [de-identified] y o  female MRN: [id number]  Unit/Bed#: [facility] 707-01 Encounter: [id number]        Active Problems:    Essential hypertension    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 native heart without angina pectoris    Type 2 diabetes mellitus with complication, with long-term current use of insulin (HCC)    Atrial flutter (HCC)    Hyperlipidemia    Gastroesophageal reflux disease without esophagitis    Moderate mitral stenosis    Acute respiratory failure with hypoxia (HCC)    Asthma    Asymptomatic carotid artery stenosis, bilateral    Atherosclerosis of artery of extremity with ulceration (HCC)    Hx of CABG    Chronic anticoagulation    Chronic combined systolic and diastolic congestive heart failure (HCC)    Postoperative hypothyroidism    Paroxysmal atrial fibrillation (HCC)    Hypokalemia    Hypocalcemia    Left bundle branch block (LBBB)    S/P TAVR (transcatheter aortic valve replacement)    Expressive aphasia    Multiple lacunar infarcts (HCC)    Vomiting    History of CVA (cerebrovascular accident)    Seizure ([facility] [address] )    Supratherapeutic INR    NSVT (nonsustained ventricular tachycardia) (HCC)    Pulmonary hypertension (HCC)    Anemia    Severe sepsis ([facility])    Lactic acidosis    Plan:     1  NSVT  Ongoing runs of asymptomatic NSVT to 13 beat  BMP this am pending  Most recent potassium 3 3 however  Likely due to cardiomyopathy with EF 35% by echo and electrolyte imbalances  Repleted electrolytes , continue beta blocker  Was on Metoprolol tartrate 100 bid prior to admission which is dose receiving as of yesterday        2  Chronic combined systolic/diastolic HF    EF 89% by echo 5/18 with moderate diffuse hypokinesis and RWMA inferoseptal and inferior walls  RV dilated and reduced function  Echo 4/18 had shown EF 50% after TAVR, but prior to TAVR EF was also 35% by echo 3/18  Here appeared volume overloaded by CXR, versus aspiration PNA   Being treated with cefepime, Flagyl, and Vanco  Also treated with standing IV Lasix to help improve respiratory status and help wean O2 as able  D/C IV lasix today  Start lasix 40mg PO BID ( her home dose)  3  CAD- Cardiac catheterization 3/18 showed patent LIMA to LAD, SVG to D1 with 50% stenosis, Y graft to D2,and D3, SVG to OM1 was 100% stenosed, SVG to RPDA was 100% stenosed  Likely LV function decline due to underlying CAD in setting of strain from other comorbidities/acute illnesses  Continue medical management for now, repeat echo when patient recovers from acute illness, as outpt  On beta blocker, was on Lisinopril 2 5 mg daily at home, as well as KCL 10 bid, and Lasix 40 bid  On asa       3  Paroxysmal atrial fibrillation  Continue home Metoprolol dose and Xarelto 20       4  Severe AS s/p TAVR  On aspirin, statin  Echo- bioprosthesis normal function, no significant perivalvular regurg  Additionally moderate MS and moderate pulmonary hypertension    5  New onset seizure- reason for admission  Brain MRI- small focus of new ischemia  Previously on warfarin, now xarelto         Follows with Dr Viviana Alcantara as outpt       Subjective/Objective   Chief Complaint/Subjective  No palpitations  No cp,sob        Vitals: /78 (BP Location: Right arm)   Pulse 79   Temp 98 4 °F (36 9 °C) (Oral)   Resp 18   Ht 5' 4" (1 626 m)   Wt 75 3 kg (166 lb 0 1 oz)   LMP  (LMP Unknown)   SpO2 98%   BMI 28 49 kg/m²     Vitals:    05/01/18 0600 05/03/18 0600   Weight: 75 7 kg (166 lb 14 2 oz) 75 3 kg (166 lb 0 1 oz)     Orthostatic Blood Pressures      Most Recent Value   Blood Pressure  159/78 filed at 05/04/2018 0700   Patient Position - Orthostatic VS  Lying filed at 05/04/2018 0700            Intake/Output Summary (Last 24 hours) at 05/04/18 1006  Last data filed at 05/03/18 2008   Gross per 24 hour   Intake                0 ml   Output              600 ml   Net             -600 ml       Invasive Devices Peripheral Intravenous Line            Peripheral IV 05/03/18 Right Wrist less than 1 day                Current Facility-Administered Medications   Medication Dose Route Frequency    acetaminophen (TYLENOL) rectal suppository 650 mg  650 mg Rectal Q4H PRN    acetaminophen (TYLENOL) tablet 650 mg  650 mg Oral Q6H PRN    albuterol inhalation solution 2 5 mg  2 5 mg Nebulization Q4H PRN    aspirin chewable tablet 81 mg  81 mg Oral Daily    atorvastatin (LIPITOR) tablet 80 mg  80 mg Oral Daily With Dinner    ferrous sulfate tablet 325 mg  325 mg Oral Daily With Breakfast    fluticasone (FLOVENT HFA) 110 MCG/ACT inhaler 1 puff  1 puff Inhalation Q12H [address]    furosemide (LASIX) injection 40 mg  40 mg Intravenous BID (diuretic)    influenza inactivated quadrivalent vaccine (FLULAVAL) IM injection 0 5 mL  0 5 mL Intramuscular Prior to discharge    insulin glargine (LANTUS) subcutaneous injection 20 Units 0 2 mL  20 Units Subcutaneous HS    insulin lispro (HumaLOG) 100 units/mL subcutaneous injection 1-6 Units  1-6 Units Subcutaneous Q6H [address]    labetalol (NORMODYNE) injection 10 mg  10 mg Intravenous Q6H PRN    levETIRAcetam (KEPPRA) tablet 750 mg  750 mg Oral Q12H LANCE    levothyroxine tablet 100 mcg  100 mcg Oral Daily    loratadine (CLARITIN) tablet 10 mg  10 mg Oral Daily    LORazepam (ATIVAN) 2 mg/mL injection 2 mg  2 mg Intravenous PRN    metoclopramide (REGLAN) injection 10 mg  10 mg Intravenous Q6H PRN    metoprolol tartrate (LOPRESSOR) tablet 100 mg  100 mg Oral Q12H LANCE    nystatin (MYCOSTATIN) powder   Topical BID    omeprazole (PRILOSEC) suspension 2 mg/mL  20 mg Oral Daily    polyethylene glycol (MIRALAX) packet 17 g  17 g Oral Daily PRN    potassium chloride (K-DUR,KLOR-CON) CR tablet 40 mEq  40 mEq Oral BID    rivaroxaban (XARELTO) tablet 20 mg  20 mg Oral Daily With Dinner    senna-docusate sodium (SENOKOT S) 8 6-50 mg per tablet 1 tablet  1 tablet Oral HS         Physical Exam: BP 159/78 (BP Location: Right arm)   Pulse 79   Temp 98 4 °F (36 9 °C) (Oral)   Resp 18   Ht 5' 4" (1 626 m)   Wt 75 3 kg (166 lb 0 1 oz)   LMP  (LMP Unknown)   SpO2 98%   BMI 28 49 kg/m²     General Appearance:    Alert, cooperative, no distress, appears stated age   Head:    Normocephalic, no scleral icterus   Eyes:    PERRL   Nose:   Nares normal, septum midline, no drainage    Throat:   Lips, mucosa, and tongue normal   Neck:   Supple, symmetrical, trachea midline,            Lungs:     Clear now to auscultation bilaterally, respirations unlabored on r/a        Heart:    Regular rate and rhythm, S1 and S2 normal   Abdomen:     Soft, non-tender, bowel sounds active all four quadrants,     no masses, no organomegaly   Extremities:   Extremities normal, atraumatic, no cyanosis or edema   Pulses:   2+ and symmetric all extremities   Skin:   Skin color, texture, turgor normal, no rashes or lesions   Neurologic:   Alert and oriented to person place and time                 Lab Results:   Recent Results (from the past 72 hour(s))   Magnesium    Collection Time: 05/01/18 10:26 AM   Result Value Ref Range    Magnesium 1 8 1 6 - 2 6 mg/dL   Iron Saturation %    Collection Time: 05/01/18 10:26 AM   Result Value Ref Range    Iron Saturation 7 %    TIBC 246 (L) 250 - 450 ug/dL    Iron 16 (L) 50 - 170 ug/dL   Ferritin    Collection Time: 05/01/18 10:26 AM   Result Value Ref Range    Ferritin 46 8 - 388 ng/mL   Fingerstick Glucose (POCT)    Collection Time: 05/01/18 10:49 AM   Result Value Ref Range    POC Glucose 278 (H) 65 - 140 mg/dl   ECG 12 lead    Collection Time: 05/01/18  2:50 PM   Result Value Ref Range    Ventricular Rate 127 BPM    Atrial Rate 133 BPM    NY Interval  ms    QRSD Interval 138 ms    QT Interval 362 ms    QTC Interval 526 ms    P Axis  degrees    QRS Axis -26 degrees    T Wave Axis 104 degrees   ECG 12 lead    Collection Time: 05/01/18  2:52 PM   Result Value Ref Range    Ventricular Rate 133 BPM Atrial Rate 133 BPM    AZ Interval  ms    QRSD Interval 144 ms    QT Interval 366 ms    QTC Interval 544 ms    P Axis  degrees    QRS Axis -31 degrees    T Wave Axis 108 degrees   Fingerstick Glucose (POCT)    Collection Time: 05/01/18  4:16 PM   Result Value Ref Range    POC Glucose 370 (H) 65 - 140 mg/dl   CBC    Collection Time: 05/01/18  4:21 PM   Result Value Ref Range    WBC 14 78 (H) 4 31 - 10 16 Thousand/uL    RBC 4 33 3 81 - 5 12 Million/uL    Hemoglobin 9 0 (L) 11 5 - 15 4 g/dL    Hematocrit 29 9 (L) 34 8 - 46 1 %    MCV 69 (L) 82 - 98 fL    MCH 20 8 (L) 26 8 - 34 3 pg    MCHC 30 1 (L) 31 4 - 37 4 g/dL    RDW 19 1 (H) 11 6 - 15 1 %    Platelets 411 210 - 986 Thousands/uL    MPV 10 1 8 9 - 12 7 fL   Basic metabolic panel    Collection Time: 05/01/18  4:21 PM   Result Value Ref Range    Sodium 140 136 - 145 mmol/L    Potassium 4 3 3 5 - 5 3 mmol/L    Chloride 108 100 - 108 mmol/L    CO2 22 21 - 32 mmol/L    Anion Gap 10 4 - 13 mmol/L    BUN 11 5 - 25 mg/dL    Creatinine 0 86 0 60 - 1 30 mg/dL    Glucose 339 (H) 65 - 140 mg/dL    Calcium 7 2 (L) 8 3 - 10 1 mg/dL    eGFR 64 ml/min/1 73sq m   Magnesium    Collection Time: 05/01/18  4:21 PM   Result Value Ref Range    Magnesium 2 2 1 6 - 2 6 mg/dL   Phosphorus    Collection Time: 05/01/18  4:21 PM   Result Value Ref Range    Phosphorus 2 9 2 3 - 4 1 mg/dL   Troponin I    Collection Time: 05/01/18  4:21 PM   Result Value Ref Range    Troponin I 0 03 <=0 04 ng/mL   Manual Differential (Non Wam)    Collection Time: 05/01/18  4:21 PM   Result Value Ref Range    Segmented % 73 %    Lymphocytes % 17 %    Monocytes % 7 4 - 12 %    Eosinophils % 3 0 - 6 %    Lymphocytes Absolute 2 51 0 60 - 4 47 Thousand/uL    Monocytes Absolute 1 03 0 00 - 1 22 Thousand/uL    Eosinophils Absolute 0 44 0 00 - 0 61 Thousand/uL    Total Counted 100     Anisocytosis Present     Poikilocytes Present     Platelet Estimate Adequate Adequate   Blood gas, arterial    Collection Time: 05/01/18  4:22 PM   Result Value Ref Range    pH, Arterial 7 295 (L) 7 350 - 7 450    pCO2, Arterial 40 5 36 0 - 44 0 mm Hg    pO2, Arterial 74 0 (L) 75 0 - 129 0 mm Hg    HCO3, Arterial 19 3 (L) 22 0 - 28 0 mmol/L    Base Excess, Arterial -6 8 mmol/L    O2 Content, Arterial 12 9 (L) 16 0 - 23 0 mL/dL    O2 HGB,Arterial  91 1 (L) 94 0 - 97 0 %    SOURCE Radial, Right     ZAY TEST Yes     ROOM AIR FIO2 100 %   Lactic Acid STAT and in 2 hours if first result greater than 2    Collection Time: 05/01/18  5:21 PM   Result Value Ref Range    LACTIC ACID 3 2 (HH) 0 5 - 2 0 mmol/L   Blood culture    Collection Time: 05/01/18  5:22 PM   Result Value Ref Range    Blood Culture No Growth at 48 hrs  Blood culture    Collection Time: 05/01/18  5:23 PM   Result Value Ref Range    Blood Culture No Growth at 48 hrs      Procalcitonin    Collection Time: 05/01/18  5:57 PM   Result Value Ref Range    Procalcitonin 0 10 <=0 25 ng/ml   Comprehensive metabolic panel    Collection Time: 05/01/18  5:57 PM   Result Value Ref Range    Sodium 140 136 - 145 mmol/L    Potassium 4 0 3 5 - 5 3 mmol/L    Chloride 106 100 - 108 mmol/L    CO2 26 21 - 32 mmol/L    Anion Gap 8 4 - 13 mmol/L    BUN 12 5 - 25 mg/dL    Creatinine 0 85 0 60 - 1 30 mg/dL    Glucose 315 (H) 65 - 140 mg/dL    Calcium 7 0 (L) 8 3 - 10 1 mg/dL    AST 44 5 - 45 U/L    ALT 23 12 - 78 U/L    Alkaline Phosphatase 95 46 - 116 U/L    Total Protein 6 8 6 4 - 8 2 g/dL    Albumin 2 7 (L) 3 5 - 5 0 g/dL    Total Bilirubin 0 79 0 20 - 1 00 mg/dL    eGFR 65 ml/min/1 73sq m   Protime-INR    Collection Time: 05/01/18  5:57 PM   Result Value Ref Range    Protime 20 6 (H) 12 1 - 14 4 seconds    INR 1 75 (H) 0 86 - 1 16   Fingerstick Glucose (POCT)    Collection Time: 05/01/18  6:16 PM   Result Value Ref Range    POC Glucose 322 (H) 65 - 140 mg/dl   Fingerstick Glucose (POCT)    Collection Time: 05/01/18 11:27 PM   Result Value Ref Range    POC Glucose 254 (H) 65 - 140 mg/dl   Basic metabolic panel    Collection Time: 05/02/18  4:52 AM   Result Value Ref Range    Sodium 143 136 - 145 mmol/L    Potassium 3 3 (L) 3 5 - 5 3 mmol/L    Chloride 107 100 - 108 mmol/L    CO2 28 21 - 32 mmol/L    Anion Gap 8 4 - 13 mmol/L    BUN 11 5 - 25 mg/dL    Creatinine 0 62 0 60 - 1 30 mg/dL    Glucose 125 65 - 140 mg/dL    Calcium 7 1 (L) 8 3 - 10 1 mg/dL    eGFR 85 ml/min/1 73sq m   CBC and differential    Collection Time: 05/02/18  4:52 AM   Result Value Ref Range    WBC 8 28 4 31 - 10 16 Thousand/uL    RBC 3 95 3 81 - 5 12 Million/uL    Hemoglobin 7 9 (L) 11 5 - 15 4 g/dL    Hematocrit 26 9 (L) 34 8 - 46 1 %    MCV 68 (L) 82 - 98 fL    MCH 20 0 (L) 26 8 - 34 3 pg    MCHC 29 4 (L) 31 4 - 37 4 g/dL    RDW 19 0 (H) 11 6 - 15 1 %    MPV 9 6 8 9 - 12 7 fL    Platelets 182 871 - 628 Thousands/uL    nRBC 0 /100 WBCs    Neutrophils Relative 70 43 - 75 %    Lymphocytes Relative 21 14 - 44 %    Monocytes Relative 8 4 - 12 %    Eosinophils Relative 1 0 - 6 %    Basophils Relative 0 0 - 1 %    Neutrophils Absolute 5 74 1 85 - 7 62 Thousands/µL    Lymphocytes Absolute 1 72 0 60 - 4 47 Thousands/µL    Monocytes Absolute 0 67 0 17 - 1 22 Thousand/µL    Eosinophils Absolute 0 11 0 00 - 0 61 Thousand/µL    Basophils Absolute 0 01 0 00 - 0 10 Thousands/µL   Lactic acid, plasma    Collection Time: 05/02/18  4:52 AM   Result Value Ref Range    LACTIC ACID 1 5 0 5 - 2 0 mmol/L   Fingerstick Glucose (POCT)    Collection Time: 05/02/18  6:29 AM   Result Value Ref Range    POC Glucose 139 65 - 140 mg/dl   Fingerstick Glucose (POCT)    Collection Time: 05/02/18 11:54 AM   Result Value Ref Range    POC Glucose 232 (H) 65 - 140 mg/dl   Fingerstick Glucose (POCT)    Collection Time: 05/02/18  4:19 PM   Result Value Ref Range    POC Glucose 289 (H) 65 - 140 mg/dl   Potassium    Collection Time: 05/02/18  6:04 PM   Result Value Ref Range    Potassium 4 1 3 5 - 5 3 mmol/L   Fingerstick Glucose (POCT)    Collection Time: 05/02/18  8:33 PM Result Value Ref Range    POC Glucose 227 (H) 65 - 140 mg/dl   Basic metabolic panel    Collection Time: 18  4:58 AM   Result Value Ref Range    Sodium 142 136 - 145 mmol/L    Potassium 4 0 3 5 - 5 3 mmol/L    Chloride 106 100 - 108 mmol/L    CO2 30 21 - 32 mmol/L    Anion Gap 6 4 - 13 mmol/L    BUN 11 5 - 25 mg/dL    Creatinine 0 69 0 60 - 1 30 mg/dL    Glucose 167 (H) 65 - 140 mg/dL    Calcium 7 4 (L) 8 3 - 10 1 mg/dL    eGFR 82 ml/min/1 73sq m   Fingerstick Glucose (POCT)    Collection Time: 18  6:23 AM   Result Value Ref Range    POC Glucose 195 (H) 65 - 140 mg/dl   Fingerstick Glucose (POCT)    Collection Time: 18 10:30 AM   Result Value Ref Range    POC Glucose 455 (H) 65 - 140 mg/dl   Fingerstick Glucose (POCT)    Collection Time: 18  4:27 PM   Result Value Ref Range    POC Glucose 210 (H) 65 - 140 mg/dl   Fingerstick Glucose (POCT)    Collection Time: 18  9:11 PM   Result Value Ref Range    POC Glucose 266 (H) 65 - 140 mg/dl   Fingerstick Glucose (POCT)    Collection Time: 18 12:12 AM   Result Value Ref Range    POC Glucose 192 (H) 65 - 140 mg/dl   Fingerstick Glucose (POCT)    Collection Time: 18  6:25 AM   Result Value Ref Range    POC Glucose 225 (H) 65 - 140 mg/dl     [address] [facility]  [phone]     Transthoracic Echocardiogram  2D, M-mode, Doppler, and Color Doppler     Study date:  01-May-2018     Patient: Morris Madera  MR number: [id number]  Account number: [de-identified]  : 1940  Age: [de-identified] years  Gender: Female  Status: Inpatient  Location: Bedside  Height: 64 in  Weight: 161 7 lb  BP: 171/ 83 mmHg     Indications: Aortic Valve Disorder Evaluate prosthetic aortic valve   Evaluate prosthetic mitral valve      Diagnoses: I35 9 - Nonrheumatic aortic valve disorder, unspecified     Sonographer:  Coley Cranker, RDCS  Primary Physician:  Meli Rea MD  Group:  [facility]  Interpreting Physician:  Cory Vega MD     SUMMARY     LEFT VENTRICLE:  The ventricle was mildly dilated  Systolic function was moderately to markedly reduced  Ejection fraction was estimated to be 35 %  There was moderate diffuse hypokinesis with distinct regional wall motion abnormalities  More severe hypokinesis to akinesis was seen in the inferoseptal and inferior walls  Wall thickness was mildly increased      VENTRICULAR SEPTUM:  There was moderate dyssynergic motion  These changes are consistent with LBBB      RIGHT VENTRICLE:  The ventricle was moderately dilated  Systolic function was mildly reduced      LEFT ATRIUM:  The atrium was moderately dilated      RIGHT ATRIUM:  The atrium was moderately dilated      MITRAL VALVE:  There was marked annular calcification  There was moderate diffuse thickening  There was moderately reduced leaflet separation  Transmitral velocity was increased due to valvular stenosis  There was moderate stenosis  There was mild regurgitation      AORTIC VALVE:  A bioprosthesis was present  It exhibited normal function  There was no significant perivalvular regurgitation  Valve mean gradient was 8 mmHg      TRICUSPID VALVE:  There was moderate regurgitation  Pulmonary artery systolic pressure was markedly increased  Estimated peak PA pressure was 70 mmHg      IVC, HEPATIC VEINS:  The inferior vena cava was dilated      HISTORY: PRIOR HISTORY: TAVR, Mitral Stenosis, CABG, MI, HTN, Asthma, CHF, DM, CVA, CAD, HLD     PROCEDURE: The procedure was performed at the bedside  This was a routine study  The transthoracic approach was used  The study included complete 2D imaging, M-mode, complete spectral Doppler, and color Doppler  The heart rate was 100 bpm,  at the start of the study  Images were obtained from the parasternal, apical, subcostal, and suprasternal notch acoustic windows   Echocardiographic views were limited due to restricted patient mobility, poor patient compliance, and lung  interference  This was a technically difficult study      LEFT VENTRICLE: The ventricle was mildly dilated  Systolic function was moderately to markedly reduced  Ejection fraction was estimated to be 35 %  There was moderate diffuse hypokinesis with distinct regional wall motion abnormalities  More severe hypokinesis to akinesis was seen in the inferoseptal and inferior walls  Wall thickness was mildly increased  DOPPLER: The study was not technically sufficient to allow evaluation of LV diastolic function      VENTRICULAR SEPTUM: Thickness was mildly increased  There was moderate dyssynergic motion  These changes are consistent with LBBB      RIGHT VENTRICLE: The ventricle was moderately dilated  Systolic function was mildly reduced  Wall thickness was normal      LEFT ATRIUM: The atrium was moderately dilated      RIGHT ATRIUM: The atrium was moderately dilated      MITRAL VALVE: There was marked annular calcification  There was moderate diffuse thickening  There was moderately reduced leaflet separation  DOPPLER: Transmitral velocity was increased due to valvular stenosis  There was moderate  stenosis  There was mild regurgitation      AORTIC VALVE: A bioprosthesis was present  It exhibited normal function  DOPPLER: Transaortic velocity was within the normal range  There was no evidence for stenosis  There was no significant regurgitation  There was no significant  perivalvular regurgitation      TRICUSPID VALVE: The valve structure was normal  DOPPLER: The transtricuspid velocity was within the normal range  There was moderate regurgitation  Pulmonary artery systolic pressure was markedly increased  Estimated peak PA pressure was  70 mmHg      PULMONIC VALVE: Not well visualized  DOPPLER: The transpulmonic velocity was within the normal range  There was no regurgitation      PERICARDIUM: There was no pericardial effusion      AORTA: The root exhibited normal size   The ascending aorta was normal in size      SYSTEMIC VEINS: IVC: The inferior vena cava was dilated      PULMONARY VEINS: DOPPLER: Doppler signals were not recordable in the pulmonary vein(s)      MEASUREMENT TABLES     DOPPLER MEASUREMENTS  Aortic valve   (Reference normals)  Mean gradient   8 mmHg   (--)     SYSTEM MEASUREMENT TABLES       Imaging: I have personally reviewed pertinent reports  Tele- NSR runs NSVT to 13 beat    Counseling / Coordination of Care  Total time spent today 30 minutes  Greater than 50% of total time was spent with the patient and / or family counseling and / or coordination of care

## 2018-05-04 NOTE — PROGRESS NOTES
Patient had a 13 beat run on V-Tach on telemetry  Patient was asymptomatic  SOD notified  No new orders at this time

## 2018-05-04 NOTE — PLAN OF CARE
Problem: PHYSICAL THERAPY ADULT  Goal: Performs mobility at highest level of function for planned discharge setting  See evaluation for individualized goals  Treatment/Interventions: Functional transfer training, LE strengthening/ROM, Therapeutic exercise, Endurance training, Patient/family training, Equipment eval/education, Bed mobility, Gait training  Equipment Recommended: Sandra Chand       See flowsheet documentation for full assessment, interventions and recommendations  Outcome: Progressing  Prognosis: Fair  Problem List: Decreased strength, Decreased endurance, Impaired balance, Decreased cognition, Decreased coordination, Decreased mobility, Impaired judgement, Decreased safety awareness  Assessment: Pt is making steady progress with functional mobility  Improved activity tolerance today with gait training as well as less assistance required for transfers  Loss of balance today with sit to stand requiring min assist from retropulsion  Pt would benefit from continued physical therapy to maximize functional mobility and safety  Recommendation:  (Rehab)     PT - OK to Discharge: (S) Yes (to rehab when stable)    See flowsheet documentation for full assessment

## 2018-05-05 DIAGNOSIS — Z79.4 TYPE 2 DIABETES MELLITUS WITH COMPLICATION, WITH LONG-TERM CURRENT USE OF INSULIN (HCC): ICD-10-CM

## 2018-05-05 DIAGNOSIS — E11.8 TYPE 2 DIABETES MELLITUS WITH COMPLICATION, WITH LONG-TERM CURRENT USE OF INSULIN (HCC): ICD-10-CM

## 2018-05-05 LAB
ANION GAP SERPL CALCULATED.3IONS-SCNC: 7 MMOL/L (ref 4–13)
BUN SERPL-MCNC: 15 MG/DL (ref 5–25)
CALCIUM SERPL-MCNC: 7.5 MG/DL (ref 8.3–10.1)
CHLORIDE SERPL-SCNC: 104 MMOL/L (ref 100–108)
CO2 SERPL-SCNC: 29 MMOL/L (ref 21–32)
CREAT SERPL-MCNC: 0.78 MG/DL (ref 0.6–1.3)
GFR SERPL CREATININE-BSD FRML MDRD: 72 ML/MIN/1.73SQ M
GLUCOSE SERPL-MCNC: 197 MG/DL (ref 65–140)
GLUCOSE SERPL-MCNC: 220 MG/DL (ref 65–140)
GLUCOSE SERPL-MCNC: 324 MG/DL (ref 65–140)
GLUCOSE SERPL-MCNC: 339 MG/DL (ref 65–140)
GLUCOSE SERPL-MCNC: 354 MG/DL (ref 65–140)
GLUCOSE SERPL-MCNC: 390 MG/DL (ref 65–140)
MAGNESIUM SERPL-MCNC: 0.9 MG/DL (ref 1.6–2.6)
MAGNESIUM SERPL-MCNC: 1.8 MG/DL (ref 1.6–2.6)
POTASSIUM SERPL-SCNC: 4.2 MMOL/L (ref 3.5–5.3)
SODIUM SERPL-SCNC: 140 MMOL/L (ref 136–145)

## 2018-05-05 PROCEDURE — 94760 N-INVAS EAR/PLS OXIMETRY 1: CPT

## 2018-05-05 PROCEDURE — 99232 SBSQ HOSP IP/OBS MODERATE 35: CPT | Performed by: INTERNAL MEDICINE

## 2018-05-05 PROCEDURE — 82948 REAGENT STRIP/BLOOD GLUCOSE: CPT

## 2018-05-05 PROCEDURE — 99233 SBSQ HOSP IP/OBS HIGH 50: CPT | Performed by: INTERNAL MEDICINE

## 2018-05-05 PROCEDURE — 80048 BASIC METABOLIC PNL TOTAL CA: CPT | Performed by: INTERNAL MEDICINE

## 2018-05-05 PROCEDURE — 83735 ASSAY OF MAGNESIUM: CPT | Performed by: INTERNAL MEDICINE

## 2018-05-05 RX ORDER — MAGNESIUM SULFATE HEPTAHYDRATE 40 MG/ML
2 INJECTION, SOLUTION INTRAVENOUS ONCE
Status: COMPLETED | OUTPATIENT
Start: 2018-05-05 | End: 2018-05-05

## 2018-05-05 RX ORDER — LISINOPRIL 5 MG/1
5 TABLET ORAL DAILY
Status: DISCONTINUED | OUTPATIENT
Start: 2018-05-05 | End: 2018-05-07 | Stop reason: HOSPADM

## 2018-05-05 RX ORDER — INSULIN GLARGINE 100 [IU]/ML
30 INJECTION, SOLUTION SUBCUTANEOUS
Status: DISCONTINUED | OUTPATIENT
Start: 2018-05-05 | End: 2018-05-07

## 2018-05-05 RX ORDER — MAGNESIUM SULFATE HEPTAHYDRATE 40 MG/ML
4 INJECTION, SOLUTION INTRAVENOUS ONCE
Status: COMPLETED | OUTPATIENT
Start: 2018-05-05 | End: 2018-05-05

## 2018-05-05 RX ADMIN — Medication 20 MG: at 09:00

## 2018-05-05 RX ADMIN — INSULIN LISPRO 3 UNITS: 100 INJECTION, SOLUTION INTRAVENOUS; SUBCUTANEOUS at 16:45

## 2018-05-05 RX ADMIN — RIVAROXABAN 20 MG: 20 TABLET, FILM COATED ORAL at 17:30

## 2018-05-05 RX ADMIN — MAGNESIUM SULFATE HEPTAHYDRATE 2 G: 40 INJECTION, SOLUTION INTRAVENOUS at 16:00

## 2018-05-05 RX ADMIN — METOPROLOL TARTRATE 100 MG: 50 TABLET ORAL at 21:45

## 2018-05-05 RX ADMIN — FUROSEMIDE 40 MG: 40 TABLET ORAL at 17:30

## 2018-05-05 RX ADMIN — METOPROLOL TARTRATE 100 MG: 50 TABLET ORAL at 08:46

## 2018-05-05 RX ADMIN — LEVETIRACETAM 750 MG: 750 TABLET ORAL at 08:46

## 2018-05-05 RX ADMIN — Medication 325 MG: at 08:46

## 2018-05-05 RX ADMIN — FUROSEMIDE 40 MG: 40 TABLET ORAL at 08:46

## 2018-05-05 RX ADMIN — LORATADINE 10 MG: 10 TABLET ORAL at 08:45

## 2018-05-05 RX ADMIN — ASPIRIN 81 MG 81 MG: 81 TABLET ORAL at 08:47

## 2018-05-05 RX ADMIN — POTASSIUM CHLORIDE 40 MEQ: 1500 TABLET, EXTENDED RELEASE ORAL at 08:46

## 2018-05-05 RX ADMIN — ATORVASTATIN CALCIUM 80 MG: 80 TABLET, FILM COATED ORAL at 17:30

## 2018-05-05 RX ADMIN — INSULIN GLARGINE 30 UNITS: 100 INJECTION, SOLUTION SUBCUTANEOUS at 21:45

## 2018-05-05 RX ADMIN — LISINOPRIL 5 MG: 5 TABLET ORAL at 10:43

## 2018-05-05 RX ADMIN — FLUTICASONE PROPIONATE 1 PUFF: 110 AEROSOL, METERED RESPIRATORY (INHALATION) at 08:47

## 2018-05-05 RX ADMIN — AMIODARONE HYDROCHLORIDE 200 MG: 200 TABLET ORAL at 17:30

## 2018-05-05 RX ADMIN — LEVOTHYROXINE SODIUM 100 MCG: 100 TABLET ORAL at 05:53

## 2018-05-05 RX ADMIN — INSULIN LISPRO 2 UNITS: 100 INJECTION, SOLUTION INTRAVENOUS; SUBCUTANEOUS at 07:48

## 2018-05-05 RX ADMIN — Medication 1 TABLET: at 21:45

## 2018-05-05 RX ADMIN — MAGNESIUM SULFATE HEPTAHYDRATE 4 G: 40 INJECTION, SOLUTION INTRAVENOUS at 10:43

## 2018-05-05 RX ADMIN — AMIODARONE HYDROCHLORIDE 200 MG: 200 TABLET ORAL at 08:45

## 2018-05-05 RX ADMIN — NYSTATIN: 100000 POWDER TOPICAL at 17:30

## 2018-05-05 RX ADMIN — POTASSIUM CHLORIDE 40 MEQ: 1500 TABLET, EXTENDED RELEASE ORAL at 17:30

## 2018-05-05 RX ADMIN — FLUTICASONE PROPIONATE 1 PUFF: 110 AEROSOL, METERED RESPIRATORY (INHALATION) at 21:47

## 2018-05-05 RX ADMIN — NYSTATIN: 100000 POWDER TOPICAL at 08:52

## 2018-05-05 RX ADMIN — INSULIN LISPRO 6 UNITS: 100 INJECTION, SOLUTION INTRAVENOUS; SUBCUTANEOUS at 12:32

## 2018-05-05 RX ADMIN — LEVETIRACETAM 750 MG: 750 TABLET ORAL at 21:45

## 2018-05-05 NOTE — PROGRESS NOTES
IM Residency Progress Note   Unit/Bed#: [facility] 707-01 Encounter: [id number]  SOD Team C       Jeanette Moya [de-identified] y o  female [id number]    Hospital Stay Days: 6      Assessment/Plan:    Active Problems:    Essential hypertension    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 native heart without angina pectoris    Type 2 diabetes mellitus with complication, with long-term current use of insulin (HCC)    Atrial flutter (HCC)    Hyperlipidemia    Gastroesophageal reflux disease without esophagitis    Moderate mitral stenosis    Acute respiratory failure with hypoxia (HCC)    Asthma    Asymptomatic carotid artery stenosis, bilateral    Atherosclerosis of artery of extremity with ulceration (HCC)    Hx of CABG    Chronic anticoagulation    Chronic combined systolic and diastolic congestive heart failure (HCC)    Postoperative hypothyroidism    Paroxysmal atrial fibrillation (HCC)    Hypokalemia    Hypocalcemia    Left bundle branch block (LBBB)    S/P TAVR (transcatheter aortic valve replacement)    Expressive aphasia    Multiple lacunar infarcts ([facility])    Vomiting    History of CVA (cerebrovascular accident)    Seizure ([facility] [address] )    Supratherapeutic INR    NSVT (nonsustained ventricular tachycardia) ([facility])    Pulmonary hypertension (HCC)    Anemia    Severe sepsis (HCC)    Lactic acidosis    Nonsustained ventricular tachycardia: This is likely due to cardiomyopathy  Patient has underlying ejection fraction of 35%  Continue to replete electrolytes  -patient's Lopressor has been titrated back to home dose of 100 mg b i d               -Continues to have episodes of NSVT with longest run of 5 beats early this morning  She is asymptomatic, Optimize electrolytes                 -Appreciate cardiology recommendations, patient was started on amiodarone for arrhythmia suppression        Chronic combined systolic and diastolic heart failure with ejection fraction 35%:              -continue intravenous diuresis  His previously believe that the patient had an underlying pneumonia, however her respiratory distress was likely secondary to her underlying heart failure and volume overload      New Onset Seizure:               -Patient had EMU which did not show any electrographic evidence of seizure  -MRI shows Stable cerebral volume loss and diffuse white matter changes most suggestive of extensive chronic microangiopathy  Enhancement of the previously seen small right occipital lobe infarct  Austin Madsen is a new diffusion abnormality measuring only 2 mm within the right centrum semiovale which may represent a small new focus of ischemia               -Seizure precautions              -continue Keppra, appreciate Neurology recommendations      cardioembolic stroke:               -previously failed Coumadin and transitioned to Xarelto                -continue Xarelto              -after discussing with Neurology there is concern for underlying hypercoagulable disorder, and they have recommended CT scan to evaluate for possible underlying malignancy       Atrial fibrillation/flutter:  Continue Xarelto and Lopressor      CAD: continue Lipitor, resume ASA, Lopressor       HTN:  BP reasonably controlled, continue current regimen     DM2:  Goal blood glucose less than 180, A1c was 7 7% April 2018               -continue sliding scale insulin              -With persistently elevated BG, will increase Lantus to 30 units qHS     COPD: No acute exacerbation                -continue Flovent and albuterol    Hypothyroidism:  Continue Synthroid     Hypomagnesia: marked this AM with Mg 0 9, will replete, recheck after repletion    Disposition: Continue inpatient care      Subjective:   No acute events overnight  Patient states she is doing well this morning  She does complain of mild cough    She denies fevers/chills, headache, chest pain/tightness, shortness of breath, palpitations, abdominal pain, nausea/vomiting/diarrhea  Vitals: Temp (24hrs), Av 1 °F (36 7 °C), Min:97 5 °F (36 4 °C), Max:98 4 °F (36 9 °C)  Current: Temperature: 98 4 °F (36 9 °C)  Vitals:    18 1611 18 1900 18 2326 18 0600   BP: 161/81 159/79 131/63    BP Location: Left arm Left arm Left arm    Pulse: 82 89 87    Resp:     Temp: 98 3 °F (36 8 °C) 98 °F (36 7 °C) 98 4 °F (36 9 °C)    TempSrc: Oral Oral Oral    SpO2: 99% 98% 95%    Weight:    73 2 kg (161 lb 6 oz)   Height:        Body mass index is 27 7 kg/m²  I/O last 24 hours: In: 480 [P O :480]  Out: 1450 [Urine:1450]      Physical Exam:   General: NAD, lying in bed comfortably  HEENT: NC/AT, EOMI, anicteric, MMM  Neck: supple, no JVD  CV: RRR, +s1 and s2, no c/g/m  Abdomen: soft, NT ND  Normoactive bowel sounds  Extremities: no clubbing/cyanosis/edema  Neurologic: AAOx3, CN II-XII grossly intact, persistent RIGHT pronator drift with 4/5 bilateral  strength but otherwise 5/5 strength throughout        Invasive Devices     Peripheral Intravenous Line            Peripheral IV 18 Right Wrist 1 day                          Labs:   Recent Results (from the past 24 hour(s))   Fingerstick Glucose (POCT)    Collection Time: 18 11:17 AM   Result Value Ref Range    POC Glucose 274 (H) 65 - 140 mg/dl   Comprehensive metabolic panel    Collection Time: 18  3:18 PM   Result Value Ref Range    Sodium 142 136 - 145 mmol/L    Potassium 4 0 3 5 - 5 3 mmol/L    Chloride 103 100 - 108 mmol/L    CO2 31 21 - 32 mmol/L    Anion Gap 8 4 - 13 mmol/L    BUN 12 5 - 25 mg/dL    Creatinine 0 90 0 60 - 1 30 mg/dL    Glucose 292 (H) 65 - 140 mg/dL    Calcium 7 5 (L) 8 3 - 10 1 mg/dL    AST 23 5 - 45 U/L    ALT 21 12 - 78 U/L    Alkaline Phosphatase 75 46 - 116 U/L    Total Protein 6 5 6 4 - 8 2 g/dL    Albumin 2 5 (L) 3 5 - 5 0 g/dL    Total Bilirubin 0 55 0 20 - 1 00 mg/dL    eGFR 61 ml/min/1 73sq m   CBC and differential    Collection Time: 05/04/18  3:18 PM   Result Value Ref Range    WBC 6 77 4 31 - 10 16 Thousand/uL    RBC 4 60 3 81 - 5 12 Million/uL    Hemoglobin 9 5 (L) 11 5 - 15 4 g/dL    Hematocrit 32 3 (L) 34 8 - 46 1 %    MCV 70 (L) 82 - 98 fL    MCH 20 7 (L) 26 8 - 34 3 pg    MCHC 29 4 (L) 31 4 - 37 4 g/dL    RDW 19 0 (H) 11 6 - 15 1 %    MPV 9 7 8 9 - 12 7 fL    Platelets 484 224 - 644 Thousands/uL    nRBC 0 /100 WBCs    Neutrophils Relative 60 43 - 75 %    Lymphocytes Relative 26 14 - 44 %    Monocytes Relative 9 4 - 12 %    Eosinophils Relative 5 0 - 6 %    Basophils Relative 0 0 - 1 %    Neutrophils Absolute 4 00 1 85 - 7 62 Thousands/µL    Lymphocytes Absolute 1 77 0 60 - 4 47 Thousands/µL    Monocytes Absolute 0 60 0 17 - 1 22 Thousand/µL    Eosinophils Absolute 0 35 0 00 - 0 61 Thousand/µL    Basophils Absolute 0 02 0 00 - 0 10 Thousands/µL   Magnesium    Collection Time: 05/04/18  3:19 PM   Result Value Ref Range    Magnesium 1 0 (L) 1 6 - 2 6 mg/dL   Fingerstick Glucose (POCT)    Collection Time: 05/04/18  5:08 PM   Result Value Ref Range    POC Glucose 224 (H) 65 - 140 mg/dl   Fingerstick Glucose (POCT)    Collection Time: 05/04/18  9:00 PM   Result Value Ref Range    POC Glucose 367 (H) 65 - 140 mg/dl   Basic metabolic panel    Collection Time: 05/05/18  5:51 AM   Result Value Ref Range    Sodium 140 136 - 145 mmol/L    Potassium 4 2 3 5 - 5 3 mmol/L    Chloride 104 100 - 108 mmol/L    CO2 29 21 - 32 mmol/L    Anion Gap 7 4 - 13 mmol/L    BUN 15 5 - 25 mg/dL    Creatinine 0 78 0 60 - 1 30 mg/dL    Glucose 197 (H) 65 - 140 mg/dL    Calcium 7 5 (L) 8 3 - 10 1 mg/dL    eGFR 72 ml/min/1 73sq m   Fingerstick Glucose (POCT)    Collection Time: 05/05/18  6:33 AM   Result Value Ref Range    POC Glucose 220 (H) 65 - 140 mg/dl       Radiology Results: I have personally reviewed pertinent reports  Other Diagnostic Testing:   I have personally reviewed pertinent reports          Active Meds:   Current Facility-Administered Medications Medication Dose Route Frequency    acetaminophen (TYLENOL) rectal suppository 650 mg  650 mg Rectal Q4H PRN    acetaminophen (TYLENOL) tablet 650 mg  650 mg Oral Q6H PRN    albuterol inhalation solution 2 5 mg  2 5 mg Nebulization Q4H PRN    amiodarone tablet 200 mg  200 mg Oral BID With Meals    [START ON 5/19/2018] amiodarone tablet 200 mg  200 mg Oral Daily With Breakfast    aspirin chewable tablet 81 mg  81 mg Oral Daily    atorvastatin (LIPITOR) tablet 80 mg  80 mg Oral Daily With Dinner    ferrous sulfate tablet 325 mg  325 mg Oral Daily With Breakfast    fluticasone (FLOVENT HFA) 110 MCG/ACT inhaler 1 puff  1 puff Inhalation Q12H [address]    furosemide (LASIX) tablet 40 mg  40 mg Oral BID (diuretic)    influenza inactivated quadrivalent vaccine (FLULAVAL) IM injection 0 5 mL  0 5 mL Intramuscular Prior to discharge    insulin glargine (LANTUS) subcutaneous injection 20 Units 0 2 mL  20 Units Subcutaneous HS    insulin lispro (HumaLOG) 100 units/mL subcutaneous injection 1-6 Units  1-6 Units Subcutaneous TID With Meals    labetalol (NORMODYNE) injection 10 mg  10 mg Intravenous Q6H PRN    levETIRAcetam (KEPPRA) tablet 750 mg  750 mg Oral Q12H [address]    levothyroxine tablet 100 mcg  100 mcg Oral Daily    loratadine (CLARITIN) tablet 10 mg  10 mg Oral Daily    LORazepam (ATIVAN) 2 mg/mL injection 2 mg  2 mg Intravenous PRN    metoclopramide (REGLAN) injection 10 mg  10 mg Intravenous Q6H PRN    metoprolol tartrate (LOPRESSOR) tablet 100 mg  100 mg Oral Q12H LANCE    nystatin (MYCOSTATIN) powder   Topical BID    omeprazole (PRILOSEC) suspension 2 mg/mL  20 mg Oral Daily    polyethylene glycol (MIRALAX) packet 17 g  17 g Oral Daily PRN    potassium chloride (K-DUR,KLOR-CON) CR tablet 40 mEq  40 mEq Oral BID    rivaroxaban (XARELTO) tablet 20 mg  20 mg Oral Daily With Dinner    senna-docusate sodium (SENOKOT S) 8 6-50 mg per tablet 1 tablet  1 tablet Oral HS         VTE Pharmacologic Prophylaxis: Xarelto  VTE Mechanical Prophylaxis: sequential compression device    Ebenezer Gonzalez DO  PGY-3 IM

## 2018-05-06 LAB
ANION GAP SERPL CALCULATED.3IONS-SCNC: 5 MMOL/L (ref 4–13)
BACTERIA BLD CULT: NORMAL
BUN SERPL-MCNC: 17 MG/DL (ref 5–25)
CALCIUM SERPL-MCNC: 8 MG/DL (ref 8.3–10.1)
CHLORIDE SERPL-SCNC: 102 MMOL/L (ref 100–108)
CO2 SERPL-SCNC: 30 MMOL/L (ref 21–32)
CREAT SERPL-MCNC: 0.85 MG/DL (ref 0.6–1.3)
GFR SERPL CREATININE-BSD FRML MDRD: 65 ML/MIN/1.73SQ M
GLUCOSE SERPL-MCNC: 208 MG/DL (ref 65–140)
GLUCOSE SERPL-MCNC: 208 MG/DL (ref 65–140)
GLUCOSE SERPL-MCNC: 271 MG/DL (ref 65–140)
GLUCOSE SERPL-MCNC: 301 MG/DL (ref 65–140)
GLUCOSE SERPL-MCNC: 352 MG/DL (ref 65–140)
MAGNESIUM SERPL-MCNC: 1.9 MG/DL (ref 1.6–2.6)
POTASSIUM SERPL-SCNC: 4.7 MMOL/L (ref 3.5–5.3)
SODIUM SERPL-SCNC: 137 MMOL/L (ref 136–145)

## 2018-05-06 PROCEDURE — 80048 BASIC METABOLIC PNL TOTAL CA: CPT | Performed by: INTERNAL MEDICINE

## 2018-05-06 PROCEDURE — 99233 SBSQ HOSP IP/OBS HIGH 50: CPT | Performed by: INTERNAL MEDICINE

## 2018-05-06 PROCEDURE — 82948 REAGENT STRIP/BLOOD GLUCOSE: CPT

## 2018-05-06 PROCEDURE — 83735 ASSAY OF MAGNESIUM: CPT | Performed by: INTERNAL MEDICINE

## 2018-05-06 PROCEDURE — 94760 N-INVAS EAR/PLS OXIMETRY 1: CPT

## 2018-05-06 RX ORDER — MAGNESIUM SULFATE HEPTAHYDRATE 40 MG/ML
2 INJECTION, SOLUTION INTRAVENOUS ONCE
Status: COMPLETED | OUTPATIENT
Start: 2018-05-06 | End: 2018-05-06

## 2018-05-06 RX ADMIN — INSULIN LISPRO 4 UNITS: 100 INJECTION, SOLUTION INTRAVENOUS; SUBCUTANEOUS at 11:43

## 2018-05-06 RX ADMIN — FUROSEMIDE 40 MG: 40 TABLET ORAL at 08:01

## 2018-05-06 RX ADMIN — INSULIN LISPRO 4 UNITS: 100 INJECTION, SOLUTION INTRAVENOUS; SUBCUTANEOUS at 17:01

## 2018-05-06 RX ADMIN — RIVAROXABAN 20 MG: 20 TABLET, FILM COATED ORAL at 16:56

## 2018-05-06 RX ADMIN — INSULIN LISPRO 4 UNITS: 100 INJECTION, SOLUTION INTRAVENOUS; SUBCUTANEOUS at 21:19

## 2018-05-06 RX ADMIN — NYSTATIN: 100000 POWDER TOPICAL at 07:59

## 2018-05-06 RX ADMIN — INSULIN GLARGINE 30 UNITS: 100 INJECTION, SOLUTION SUBCUTANEOUS at 21:19

## 2018-05-06 RX ADMIN — LABETALOL 20 MG/4 ML (5 MG/ML) INTRAVENOUS SYRINGE 10 MG: at 08:00

## 2018-05-06 RX ADMIN — NYSTATIN: 100000 POWDER TOPICAL at 17:01

## 2018-05-06 RX ADMIN — MAGNESIUM SULFATE HEPTAHYDRATE 2 G: 40 INJECTION, SOLUTION INTRAVENOUS at 10:24

## 2018-05-06 RX ADMIN — Medication 1 TABLET: at 21:21

## 2018-05-06 RX ADMIN — POTASSIUM CHLORIDE 40 MEQ: 1500 TABLET, EXTENDED RELEASE ORAL at 16:57

## 2018-05-06 RX ADMIN — LORATADINE 10 MG: 10 TABLET ORAL at 08:00

## 2018-05-06 RX ADMIN — POTASSIUM CHLORIDE 40 MEQ: 1500 TABLET, EXTENDED RELEASE ORAL at 08:00

## 2018-05-06 RX ADMIN — LEVETIRACETAM 750 MG: 750 TABLET ORAL at 21:21

## 2018-05-06 RX ADMIN — METOPROLOL TARTRATE 100 MG: 50 TABLET ORAL at 08:00

## 2018-05-06 RX ADMIN — LEVETIRACETAM 750 MG: 750 TABLET ORAL at 08:00

## 2018-05-06 RX ADMIN — LISINOPRIL 5 MG: 5 TABLET ORAL at 08:01

## 2018-05-06 RX ADMIN — FLUTICASONE PROPIONATE 1 PUFF: 110 AEROSOL, METERED RESPIRATORY (INHALATION) at 07:58

## 2018-05-06 RX ADMIN — ATORVASTATIN CALCIUM 80 MG: 80 TABLET, FILM COATED ORAL at 16:55

## 2018-05-06 RX ADMIN — Medication 325 MG: at 08:01

## 2018-05-06 RX ADMIN — ASPIRIN 81 MG 81 MG: 81 TABLET ORAL at 08:00

## 2018-05-06 RX ADMIN — INSULIN LISPRO 2 UNITS: 100 INJECTION, SOLUTION INTRAVENOUS; SUBCUTANEOUS at 07:59

## 2018-05-06 RX ADMIN — METOPROLOL TARTRATE 100 MG: 50 TABLET ORAL at 21:21

## 2018-05-06 RX ADMIN — AMIODARONE HYDROCHLORIDE 200 MG: 200 TABLET ORAL at 08:01

## 2018-05-06 RX ADMIN — FUROSEMIDE 40 MG: 40 TABLET ORAL at 16:56

## 2018-05-06 RX ADMIN — FLUTICASONE PROPIONATE 1 PUFF: 110 AEROSOL, METERED RESPIRATORY (INHALATION) at 21:21

## 2018-05-06 RX ADMIN — Medication 20 MG: at 08:05

## 2018-05-06 RX ADMIN — AMIODARONE HYDROCHLORIDE 200 MG: 200 TABLET ORAL at 16:56

## 2018-05-06 RX ADMIN — LEVOTHYROXINE SODIUM 100 MCG: 100 TABLET ORAL at 05:39

## 2018-05-06 NOTE — PROGRESS NOTES
IM Residency Progress Note   [facility] Encounter: [id number]  SOD Team C       Art Pool [de-identified] y o  female [id number]    Hospital Stay Days: 7      Assessment/Plan:    Active Problems:    Essential hypertension    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 native heart without angina pectoris    Type 2 diabetes mellitus with complication, with long-term current use of insulin (HCC)    Atrial flutter (HCC)    Hyperlipidemia    Gastroesophageal reflux disease without esophagitis    Moderate mitral stenosis    Acute respiratory failure with hypoxia (HCC)    Asthma    Asymptomatic carotid artery stenosis, bilateral    Atherosclerosis of artery of extremity with ulceration (HCC)    Hx of CABG    Chronic anticoagulation    Chronic combined systolic and diastolic congestive heart failure (HCC)    Postoperative hypothyroidism    Paroxysmal atrial fibrillation (HCC)    Hypokalemia    Hypocalcemia    Left bundle branch block (LBBB)    S/P TAVR (transcatheter aortic valve replacement)    Expressive aphasia    Multiple lacunar infarcts (HCC)    Vomiting    History of CVA (cerebrovascular accident)    Seizure ([facility] [address] )    Supratherapeutic INR    NSVT (nonsustained ventricular tachycardia) (HCC)    Pulmonary hypertension (HCC)    Anemia    Severe sepsis (HCC)    Lactic acidosis    Nonsustained ventricular tachycardia: This is likely due to cardiomyopathy  Patient has underlying ejection fraction of 35%  Continue to replete electrolytes  -patient's Lopressor has been titrated back to home dose of 100 mg b i d               -Continues to have episodes of NSVT with longest run of 6 beats early this morning  She is asymptomatic during episodes  Optimize electrolytes as below, amiodarone as per cardiology                  -Appreciate cardiology recommendations, patient was started on amiodarone for arrhythmia suppression        Chronic combined systolic and diastolic heart failure with ejection fraction 35%:              -transitioned to po diuretic  His previously believe that the patient had an underlying pneumonia, however her respiratory distress was likely secondary to her underlying heart failure and volume overload      New Onset Seizure:               -Patient had EMU which did not show any electrographic evidence of seizure  -MRI shows stable cerebral volume loss and diffuse white matter changes most suggestive of extensive chronic microangiopathy  Enhancement of the previously seen small right occipital lobe infarct  Dannielle Sanchez is a new diffusion abnormality measuring only 2 mm within the right centrum semiovale which may represent a small new focus of ischemia               -Seizure precautions              -continue Keppra, appreciate Neurology recommendations      cardioembolic stroke:               -previously failed Coumadin and transitioned to Xarelto                -continue Xarelto              -after discussing with Neurology there is concern for underlying hypercoagulable disorder, and they have recommended CT scan to evaluate for possible underlying malignancy       Atrial fibrillation/flutter:  Continue Xarelto and Lopressor      CAD: continue Lipitor, resume ASA, Lopressor       HTN:  BP reasonably controlled, continue current regimen     DM2:  Goal blood glucose less than 180, A1c was 7 7% April 2018               -continue sliding scale insulin              -With persistently elevated BG, will increase Lantus to 30 units qHS     COPD: No acute exacerbation                -continue Flovent and albuterol    Hypothyroidism:  Continue Synthroid     Hypomagnesia: Improved, Mg 1 9 this AM   Will continue repletion    Disposition: Continue inpatient care      Subjective:   No acute events overnight  Patient reports she is doing well this morning, and reports some improvement in cough    She denies fever/chills, headache, chest pain/tightness, shortness of breath, palpitations, abdominal pain, nausea/vomiting/diarrhea  Vitals: Temp (24hrs), Av 2 °F (36 8 °C), Min:98 °F (36 7 °C), Max:98 5 °F (36 9 °C)  Current: Temperature: 98 4 °F (36 9 °C)  Vitals:    18 1610 18 2019 18 2336 18 0600   BP: 109/56 150/74 159/79    BP Location: Left arm Left arm Left arm    Pulse: 74 76 71    Resp:     Temp: 98 °F (36 7 °C) 98 5 °F (36 9 °C) 98 4 °F (36 9 °C)    TempSrc: Oral Oral Oral    SpO2: 96% 96% 93%    Weight:    73 1 kg (161 lb 2 5 oz)   Height:        Body mass index is 27 66 kg/m²  I/O last 24 hours: In: 480 [P O :480]  Out: 400 [Urine:400]      Physical Exam:   General: NAD, sitting on edge of bed  HEENT: NC/AT, anicteric, PERRL, MMM  Neck: supple, no JVD  CV: RRR, +s1/se, no c/g/m  Respiratory: CTA B/L without wheezes/rhonchi/rales, non-labored  Abdomen: soft, NT ND  Normoactive bowel sounds  Extremities: no cyanosis/clubbing/edema  Neurologic: AAOx3, CN II-XII grossly intact, persistent right pronator drift with 4/5 bilateral  strength  Otherwise 5/5 strength throughout        Invasive Devices          No matching active lines, drains, or airways                    Labs:   Recent Results (from the past 24 hour(s))   Fingerstick Glucose (POCT)    Collection Time: 18  9:34 AM   Result Value Ref Range    POC Glucose 324 (H) 65 - 140 mg/dl   Fingerstick Glucose (POCT)    Collection Time: 18 11:07 AM   Result Value Ref Range    POC Glucose 354 (H) 65 - 140 mg/dl   Fingerstick Glucose (POCT)    Collection Time: 18  2:03 PM   Result Value Ref Range    POC Glucose 339 (H) 65 - 140 mg/dl   Magnesium    Collection Time: 18  2:49 PM   Result Value Ref Range    Magnesium 1 8 1 6 - 2 6 mg/dL   Fingerstick Glucose (POCT)    Collection Time: 18  9:22 PM   Result Value Ref Range    POC Glucose 390 (H) 65 - 140 mg/dl   Fingerstick Glucose (POCT)    Collection Time: 18  6:38 AM   Result Value Ref Range    POC Glucose 208 (H) 65 - 140 mg/dl       Radiology Results: I have personally reviewed pertinent reports  Other Diagnostic Testing:   I have personally reviewed pertinent reports          Active Meds:   Current Facility-Administered Medications   Medication Dose Route Frequency    acetaminophen (TYLENOL) rectal suppository 650 mg  650 mg Rectal Q4H PRN    acetaminophen (TYLENOL) tablet 650 mg  650 mg Oral Q6H PRN    albuterol inhalation solution 2 5 mg  2 5 mg Nebulization Q4H PRN    amiodarone tablet 200 mg  200 mg Oral BID With Meals    [START ON 5/19/2018] amiodarone tablet 200 mg  200 mg Oral Daily With Breakfast    aspirin chewable tablet 81 mg  81 mg Oral Daily    atorvastatin (LIPITOR) tablet 80 mg  80 mg Oral Daily With Dinner    ferrous sulfate tablet 325 mg  325 mg Oral Daily With Breakfast    fluticasone (FLOVENT HFA) 110 MCG/ACT inhaler 1 puff  1 puff Inhalation Q12H [address]    furosemide (LASIX) tablet 40 mg  40 mg Oral BID (diuretic)    influenza inactivated quadrivalent vaccine (FLULAVAL) IM injection 0 5 mL  0 5 mL Intramuscular Prior to discharge    insulin glargine (LANTUS) subcutaneous injection 30 Units 0 3 mL  30 Units Subcutaneous HS    insulin lispro (HumaLOG) 100 units/mL subcutaneous injection 1-6 Units  1-6 Units Subcutaneous TID With Meals    labetalol (NORMODYNE) injection 10 mg  10 mg Intravenous Q6H PRN    levETIRAcetam (KEPPRA) tablet 750 mg  750 mg Oral Q12H [address]    levothyroxine tablet 100 mcg  100 mcg Oral Daily    lisinopril (ZESTRIL) tablet 5 mg  5 mg Oral Daily    loratadine (CLARITIN) tablet 10 mg  10 mg Oral Daily    LORazepam (ATIVAN) 2 mg/mL injection 2 mg  2 mg Intravenous PRN    metoclopramide (REGLAN) injection 10 mg  10 mg Intravenous Q6H PRN    metoprolol tartrate (LOPRESSOR) tablet 100 mg  100 mg Oral Q12H LANCE    nystatin (MYCOSTATIN) powder   Topical BID    omeprazole (PRILOSEC) suspension 2 mg/mL  20 mg Oral Daily    polyethylene glycol (MIRALAX) packet 17 g  17 g Oral Daily PRN    potassium chloride (K-DUR,KLOR-CON) CR tablet 40 mEq  40 mEq Oral BID    rivaroxaban (XARELTO) tablet 20 mg  20 mg Oral Daily With Dinner    senna-docusate sodium (SENOKOT S) 8 6-50 mg per tablet 1 tablet  1 tablet Oral HS         VTE Pharmacologic Prophylaxis: Xarelto  VTE Mechanical Prophylaxis: sequential compression device    Kb Shore,   PGY-3 IM

## 2018-05-07 VITALS
BODY MASS INDEX: 27.4 KG/M2 | DIASTOLIC BLOOD PRESSURE: 71 MMHG | RESPIRATION RATE: 18 BRPM | SYSTOLIC BLOOD PRESSURE: 165 MMHG | HEART RATE: 64 BPM | OXYGEN SATURATION: 99 % | TEMPERATURE: 98.8 F | WEIGHT: 160.5 LBS | HEIGHT: 64 IN

## 2018-05-07 LAB
ANION GAP SERPL CALCULATED.3IONS-SCNC: 6 MMOL/L (ref 4–13)
BUN SERPL-MCNC: 21 MG/DL (ref 5–25)
CALCIUM SERPL-MCNC: 8.7 MG/DL (ref 8.3–10.1)
CHLORIDE SERPL-SCNC: 103 MMOL/L (ref 100–108)
CO2 SERPL-SCNC: 30 MMOL/L (ref 21–32)
CREAT SERPL-MCNC: 0.96 MG/DL (ref 0.6–1.3)
GFR SERPL CREATININE-BSD FRML MDRD: 56 ML/MIN/1.73SQ M
GLUCOSE SERPL-MCNC: 180 MG/DL (ref 65–140)
GLUCOSE SERPL-MCNC: 216 MG/DL (ref 65–140)
GLUCOSE SERPL-MCNC: 225 MG/DL (ref 65–140)
GLUCOSE SERPL-MCNC: 409 MG/DL (ref 65–140)
MAGNESIUM SERPL-MCNC: 1.8 MG/DL (ref 1.6–2.6)
POTASSIUM SERPL-SCNC: 4.5 MMOL/L (ref 3.5–5.3)
SODIUM SERPL-SCNC: 139 MMOL/L (ref 136–145)

## 2018-05-07 PROCEDURE — 99232 SBSQ HOSP IP/OBS MODERATE 35: CPT | Performed by: INTERNAL MEDICINE

## 2018-05-07 PROCEDURE — 82948 REAGENT STRIP/BLOOD GLUCOSE: CPT

## 2018-05-07 PROCEDURE — 83735 ASSAY OF MAGNESIUM: CPT | Performed by: INTERNAL MEDICINE

## 2018-05-07 PROCEDURE — 92526 ORAL FUNCTION THERAPY: CPT

## 2018-05-07 PROCEDURE — 80048 BASIC METABOLIC PNL TOTAL CA: CPT | Performed by: INTERNAL MEDICINE

## 2018-05-07 RX ORDER — INSULIN GLARGINE 100 [IU]/ML
20 INJECTION, SOLUTION SUBCUTANEOUS EVERY 12 HOURS SCHEDULED
Status: DISCONTINUED | OUTPATIENT
Start: 2018-05-07 | End: 2018-05-07 | Stop reason: HOSPADM

## 2018-05-07 RX ORDER — ALBUTEROL SULFATE 90 UG/1
2 AEROSOL, METERED RESPIRATORY (INHALATION) EVERY 4 HOURS PRN
Status: DISCONTINUED | OUTPATIENT
Start: 2018-05-07 | End: 2018-05-07 | Stop reason: HOSPADM

## 2018-05-07 RX ORDER — FERROUS SULFATE 325(65) MG
325 TABLET ORAL
Qty: 30 TABLET | Refills: 0 | Status: SHIPPED | OUTPATIENT
Start: 2018-05-08 | End: 2018-05-18

## 2018-05-07 RX ORDER — LEVETIRACETAM 750 MG/1
750 TABLET ORAL EVERY 12 HOURS SCHEDULED
Qty: 60 TABLET | Refills: 0 | Status: SHIPPED | OUTPATIENT
Start: 2018-05-07 | End: 2018-06-13 | Stop reason: SDUPTHER

## 2018-05-07 RX ORDER — MAGNESIUM SULFATE 1 G/100ML
1 INJECTION INTRAVENOUS ONCE
Status: COMPLETED | OUTPATIENT
Start: 2018-05-07 | End: 2018-05-07

## 2018-05-07 RX ORDER — INSULIN GLARGINE 100 [IU]/ML
30 INJECTION, SOLUTION SUBCUTANEOUS 2 TIMES DAILY
Qty: 10 ML | Refills: 0 | OUTPATIENT
Start: 2018-05-07

## 2018-05-07 RX ORDER — AMIODARONE HYDROCHLORIDE 200 MG/1
200 TABLET ORAL
Qty: 30 TABLET | Refills: 0 | Status: SHIPPED | OUTPATIENT
Start: 2018-05-19 | End: 2018-05-29 | Stop reason: SDUPTHER

## 2018-05-07 RX ORDER — AMIODARONE HYDROCHLORIDE 200 MG/1
200 TABLET ORAL 2 TIMES DAILY WITH MEALS
Qty: 22 TABLET | Refills: 0 | Status: SHIPPED | OUTPATIENT
Start: 2018-05-07 | End: 2018-05-18

## 2018-05-07 RX ORDER — INSULIN GLARGINE 100 [IU]/ML
30 INJECTION, SOLUTION SUBCUTANEOUS EVERY 12 HOURS SCHEDULED
Status: DISCONTINUED | OUTPATIENT
Start: 2018-05-07 | End: 2018-05-07

## 2018-05-07 RX ADMIN — METOPROLOL TARTRATE 100 MG: 50 TABLET ORAL at 08:20

## 2018-05-07 RX ADMIN — NYSTATIN 1 APPLICATION: 100000 POWDER TOPICAL at 08:21

## 2018-05-07 RX ADMIN — LEVOTHYROXINE SODIUM 100 MCG: 100 TABLET ORAL at 05:01

## 2018-05-07 RX ADMIN — MAGNESIUM SULFATE HEPTAHYDRATE 1 G: 1 INJECTION, SOLUTION INTRAVENOUS at 11:07

## 2018-05-07 RX ADMIN — FUROSEMIDE 40 MG: 40 TABLET ORAL at 08:20

## 2018-05-07 RX ADMIN — INSULIN LISPRO 6 UNITS: 100 INJECTION, SOLUTION INTRAVENOUS; SUBCUTANEOUS at 11:17

## 2018-05-07 RX ADMIN — LEVETIRACETAM 750 MG: 750 TABLET ORAL at 08:20

## 2018-05-07 RX ADMIN — INSULIN GLARGINE 20 UNITS: 100 INJECTION, SOLUTION SUBCUTANEOUS at 08:47

## 2018-05-07 RX ADMIN — FLUTICASONE PROPIONATE 1 PUFF: 110 AEROSOL, METERED RESPIRATORY (INHALATION) at 08:21

## 2018-05-07 RX ADMIN — LORATADINE 10 MG: 10 TABLET ORAL at 08:20

## 2018-05-07 RX ADMIN — LISINOPRIL 5 MG: 5 TABLET ORAL at 08:20

## 2018-05-07 RX ADMIN — POTASSIUM CHLORIDE 40 MEQ: 1500 TABLET, EXTENDED RELEASE ORAL at 08:20

## 2018-05-07 RX ADMIN — Medication 325 MG: at 08:20

## 2018-05-07 RX ADMIN — AMIODARONE HYDROCHLORIDE 200 MG: 200 TABLET ORAL at 08:20

## 2018-05-07 RX ADMIN — Medication 20 MG: at 08:28

## 2018-05-07 RX ADMIN — INSULIN LISPRO 1 UNITS: 100 INJECTION, SOLUTION INTRAVENOUS; SUBCUTANEOUS at 08:21

## 2018-05-07 RX ADMIN — ASPIRIN 81 MG 81 MG: 81 TABLET ORAL at 08:20

## 2018-05-07 NOTE — DISCHARGE INSTRUCTIONS
You have developed an irregular heart rhythm (NSVT) for which you will need to take Amiodarone 200mg twice a day for 14 days total and then you will take 200mg daily  Please see your prescription as the start and stop dates of the dosing is written out for you  Please follow up with your doctors as recommended  Effects of a Stroke   WHAT YOU NEED TO KNOW:   What are the effects of a stroke? The effects of a stroke may be different for everyone  They may depend on where the stroke happened in your brain and how much damage occurred there  Some people may make a full recovery  Other people may have long-term effects  It is important to talk to your healthcare provider about any symptoms you have after a stroke  There are medicines and therapies to help manage the effects of a stroke  What should family or support persons know about the effects of a stroke? It is important for family or support persons to know how a stroke has affected your loved one  Know when to call 911, seek immediate care, or call your loved one's healthcare provider  What speech, language, or memory problems can a stroke cause? · Difficulty finding the right words or putting complete sentences together    · Difficulty putting words together that make sense    · Difficulty paying attention or a short attention span    · Difficulty writing or reading    · Problems with memory or being disoriented to time, place, or situation    · Problems thinking clearly or feeling confused    · Difficulty understanding written or spoken language or learning something new  What muscle and nerve problems can a stroke cause? A stroke may affect one side of your body or part of one side  You may be at an increased risk for falling if you have difficulty moving your leg muscles   You may have any of the following:  · Inability to move your arm, leg, or one side of your face (paralysis)    · Muscle weakness, spasms, or muscles that stay in one position (contracted)    · Poor balance, difficulty walking, or difficulty grasping objects    · Changes in your vision or poor vision    · Ignoring, or being unaware of one side of your body    · Numbness of your arm, hand, fingers, leg, foot, or toes    · Pain, tickling, or prickling in weak or paralyzed parts of your body  What bowel and bladder problems can a stroke cause? · Loss of control of your urine or bowel movements    · Feeling like you have to urinate frequently, even when your bladder is not full    · Difficulty emptying your bladder or constipation  What swallowing and eating problems can a stroke cause? · Difficulty swallowing food    · Difficulty feeding yourself    · A lack of taste or a change in the way you taste things    · An increased risk for aspiration (food moves into the lungs) and pneumonia due to swallowing problems  What changes in personality or mood can a stroke cause? · Depression, sadness, irritability, or hopelessness    · Anger, frustration, or anxiety    · Difficulty controlling emotions or expressing inappropriate emotions    · Quick mood changes  What problems with fatigue can a stroke cause? · Low energy levels    · Tiring easily    · A lack of motivation  What sleeping problems can a stroke cause? · Development of sleep apnea    · Changes in sleep such as sleeping too much or not enough  Where can I find support and more information? · National Stroke Association  [address] , [place] [facility] [place] [facility]  Phone: 3- [phone]  Web Address: [employer] [facility] [place]  What problems with sexual function can a stroke cause?    · Difficulty having or keeping an erection    · Vaginal dryness    · A decreased interest in sex  Call 911 or have someone else call for any of the following:   · You have any of the following signs of a stroke:      ¨ Numbness or drooping on one side of your face     ¨ Weakness in an arm or leg    ¨ Confusion or difficulty speaking    ¨ Dizziness, a severe headache, or vision loss         · You cannot be woken up  · You fall and hit your head  · You have a seizure  · You feel lightheaded, short of breath, and have chest pain  · You cough up blood  When should I seek immediate care? · Your arm or leg is painful, red, or larger than normal      · You feel weak, dizzy, or faint  · You have a fall without hitting your head  · Your blood sugar level or blood pressure is higher or lower than your healthcare provider said it should be  · You bleed heavily or cannot stop bleeding from a cut or injury  · You have a new, severe headache  When should I contact my healthcare provider? · You have a fever  · You have a rash  · You have new or worsening symptoms  · You feel extremely sad or anxious  · You feel you are unable to cope with your condition  · You have trouble sleeping  · You have open sores  · You choke or cough when eating or drinking  · You have questions or concerns about your condition or care  CARE AGREEMENT:   You have the right to help plan your care  Learn about your health condition and how it may be treated  Discuss treatment options with your caregivers to decide what care you want to receive  You always have the right to refuse treatment  The above information is an  only  It is not intended as medical advice for individual conditions or treatments  Talk to your doctor, nurse or pharmacist before following any medical regimen to see if it is safe and effective for you  © 2017 [employer]  Information is for End User's use only and may not be sold, redistributed or otherwise used for commercial purposes  All illustrations and images included in CareNotes® are the copyrighted property of [employer]  or [employer] Felipe Spaulding      Supraventricular Tachycardia   WHAT YOU NEED TO KNOW:   Supraventricular tachycardia (SVT) is a condition that causes your heart to beat much faster than it should  SVT is a type of abnormal heart rhythm, called an arrhythmia, that starts in the upper part of your heart  It may last a few seconds or hours to several days  DISCHARGE INSTRUCTIONS:   Call 911 for the following:   · You have any of the following signs of a heart attack:      ¨ Squeezing, pressure, or pain in your chest that lasts longer than 5 minutes or returns    ¨ Discomfort or pain in your back, neck, jaw, stomach, or arm     ¨ Trouble breathing    ¨ Nausea or vomiting    ¨ Lightheadedness or a sudden cold sweat, especially with chest pain or trouble breathing    Seek care immediately if:   · You have dizziness, lightheadedness, or feel faint  · You have sudden numbness or weakness in your arms or legs  Contact your healthcare provider if:   · Your symptoms get worse, or you have new symptoms  · You have swelling in your ankles or feet  · You have questions or concerns about your condition or care  Medicines:   · Medicines  can help control your heart rate and rhythm  You may need more than one medicine to treat your symptoms  · Take your medicine as directed  Contact your healthcare provider if you think your medicine is not helping or if you have side effects  Tell him or her if you are allergic to any medicine  Keep a list of the medicines, vitamins, and herbs you take  Include the amounts, and when and why you take them  Bring the list or the pill bottles to follow-up visits  Carry your medicine list with you in case of an emergency  How to manage or prevent SVT:   · Perform vagal maneuvers as directed when you have symptoms of SVT  Lie down flat and bear down like you are having a bowel movement  Do this for 10 to 30 seconds  · Do not drink caffeine or alcohol  These can increase your risk for SVT  · Keep a record of your symptoms  Write down what you ate or what you were doing before an episode of SVT   Also write down anything you did to make the SVT stop  Bring your record to follow up visits with your healthcare provider  · Eat heart-healthy foods  These include fruits, vegetables, whole-grain breads, low-fat dairy products, beans, lean meats, and fish  Replace butter and margarine with heart-healthy oils such as olive oil and canola oil  · Exercise regularly and maintain a healthy weight  Ask about the best exercise plan for you  Ask your healthcare provider how much you should weigh  Ask him to help you create a weight loss plan if you are overweight  · Do not smoke  Nicotine and other chemicals in cigarettes and cigars can cause heart and lung damage  Ask your healthcare provider for information if you currently smoke and need help to quit  E-cigarettes or smokeless tobacco still contain nicotine  Talk to your healthcare provider before you use these products  Follow up with your healthcare provider as directed:  Write down your questions so you remember to ask them during your visits  © 2017 [address] [employer] Information is for End User's use only and may not be sold, redistributed or otherwise used for commercial purposes  All illustrations and images included in CareNotes® are the copyrighted property of [employer]  or Luis Felipe Spaulding  The above information is an  only  It is not intended as medical advice for individual conditions or treatments  Talk to your doctor, nurse or pharmacist before following any medical regimen to see if it is safe and effective for you

## 2018-05-07 NOTE — RESTORATIVE TECHNICIAN NOTE
Restorative Specialist Mobility Note       Activity: Ambulate in morrison, Ambulate in room, Bathroom privileges, Chair, Dangle, Stand at bedside (Educated/encouraged pt to ambulate with assistance 3-4 x's/day  Chair alarm on   Pt callbell, phone/tray within reach )     Assistive Device: Front wheel walker          Vanna Closs BS, Restorative Technician, [employer]

## 2018-05-07 NOTE — SPEECH THERAPY NOTE
Speech Language/Pathology    Speech/Language Pathology Progress Note    Patient Name: Fran Llamas  CPQUT'P Date: 5/7/2018     Subjective:  "This is so good" referring to thin water & thin juice  Objective:  Pt was seen for diagnostic dysphagia tx p review of records  No recent sx & pt reported to toelrate Level 2 dysphagia diet with nectar thick liquid  No dentures available (they remain at home)    Pt was fully a & o & assessed c Level 2 & 3 diet items as well as with 6 ozs of thin liq by individual & successive sips  Adequate mastication, bolus formation and transfer  Adequate appearing oral control  Prompt appearing swallow initation & good laryngeal rise by palpation  No coughing, throat clearing, change in vocal quality or BS c intake today  Assessment:  Pt tolerated soft/solid food and thin liquid c no s/s oropharyngeal dysphagia  Plan/Recommendations:  Upgrade to Level 3 dysphagia diet, thin liquid  OK for regular diet/reg textured food at home (when dentures are in place)  No additional recommendations (d/c f/u)

## 2018-05-07 NOTE — TELEPHONE ENCOUNTER
Rx not filled today  Appears patient is still inpatient  Could we please verify that she is still inpatient and make sure she has SUSIE scheduled upon her discharge    Thanks Gloria

## 2018-05-07 NOTE — RESPIRATORY THERAPY NOTE
RT Protocol Note  Vilma Kearney [de-identified] y o  female MRN: [id number]  Unit/Bed#: [facility] 707-01 Encounter: [id number]    Assessment    Active Problems:    Essential hypertension    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 native heart without angina pectoris    Type 2 diabetes mellitus with complication, with long-term current use of insulin (HCC)    Atrial flutter (HCC)    Hyperlipidemia    Gastroesophageal reflux disease without esophagitis    Moderate mitral stenosis    Acute respiratory failure with hypoxia (HCC)    Asthma    Asymptomatic carotid artery stenosis, bilateral    Atherosclerosis of artery of extremity with ulceration (HCC)    Hx of CABG    Chronic anticoagulation    Chronic combined systolic and diastolic congestive heart failure (HCC)    Postoperative hypothyroidism    Paroxysmal atrial fibrillation (HCC)    Hypokalemia    Hypocalcemia    Left bundle branch block (LBBB)    S/P TAVR (transcatheter aortic valve replacement)    Expressive aphasia    Multiple lacunar infarcts (HCC)    Vomiting    History of CVA (cerebrovascular accident)    [facility]    Supratherapeutic INR    NSVT (nonsustained ventricular tachycardia) (HCC)    Pulmonary hypertension (HCC)    Anemia    Severe sepsis (HCC)    Lactic acidosis      Home Pulmonary Medications:  Albuterol hfa       Past Medical History:   Diagnosis Date    Altered mental status     Anticoagulated     Coumadin for Aflutter    Asthma     Atrial flutter (HCC)     maintained on coumadin anticoag    CAD (coronary artery disease)     Candidiasis     Carotid stenosis, bilateral     Cataract     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) (HCC)     Chronic fatigue syndrome     Chronic low back pain     Concussion w loss of consciousness of unsp duration, init     Coronary artery disease     CVA [facility]rebral vascular [facility] ) 4/17/2018    Diabetes mellitus ([facility] )     type 2, insulin dependent    DJD (degenerative joint disease)     GERD (gastroesophageal reflux disease)     Herpes zoster     HLD (hyperlipidemia)     HTN (hypertension)     Hyperlipidemia     Hypothyroidism     Irritable bowel syndrome     Lumbar radiculopathy     Lyme disease     Dx in hospital 8/2011    Lyme disease     MI [facility]ocardial infarction) [facility] )     Migraines     Muscle spasm     Non-neoplastic nevus     Nontoxic multinodular goiter     OA (osteoarthritis)     Osteoarthritis     Other chronic pain     PAD (peripheral artery disease) (HCC)     Palpitations     Pseudogout     Spinal stenosis     Transient cerebral ischemia     Trigger ring finger     Viral gastroenteritis      Social History     Social History    Marital status:      Spouse name: N/A    Number of children: N/A    Years of education: N/A     Social History Main Topics    Smoking status: Never Smoker    Smokeless tobacco: Never Used    Alcohol use No    Drug use: No    Sexual activity: No     Other Topics Concern    None     Social History Narrative    None       Subjective    Subjective Data: Pt denies sob  Objective    Physical Exam:   Assessment Type: Assess only  General Appearance: Awake  Respiratory Pattern: Normal  Chest Assessment: Chest expansion symmetrical  Bilateral Breath Sounds: Clear  O2 Device: RA    Vitals:  Blood pressure 147/73, pulse 64, temperature 98 °F (36 7 °C), temperature source Oral, resp  rate 18, height 5' 4" (1 626 m), weight 72 8 kg (160 lb 7 9 oz), SpO2 98 %, not currently breastfeeding  Results from last 7 days  Lab Units 05/01/18  1622   PH ART  7 295*   PCO2 ART mm Hg 40 5   PO2 ART mm Hg 74 0*   HCO3 ART mmol/L 19 3*   BASE EXC ART mmol/L -6 8   O2 CONTENT ART mL/dL 12 9*   O2 HGB, ARTERIAL % 91 1*   ABG SOURCE  Radial, Right   ZAY TEST  Yes   NON VENT ROOM AIR % 100       Imaging and other studies: I have personally reviewed pertinent reports        O2 Device: RA     Plan    Respiratory Plan: Home Bronchodilator Patient pathway        Resp Comments: Pt evaluated per resp protocol again  She has a history of asthma but she is currently not in exacerbation  She had orders for prn udn however she takes HFA albuterol at home  BS clear bilateral and no dyspnea   D/C albuterol udn and replaced with Allbuterol HFA Q4 prn

## 2018-05-07 NOTE — SOCIAL WORK
CM was informed that pt is medically stable for dc today  CM discussed WC Wolfgang Moorephilip tinoco with pt--pt and son Silvana Nam both agreeable  CM arranged with [facility] for a 2:30pm dc to [facility]  CM notified pt, pts son Jesse Lee at [facility], and pts bedside RN Kimberly of dc time  Facility transfer form completed  Chart copy requested

## 2018-05-07 NOTE — PROGRESS NOTES
IM Residency Progress Note   Unit/Bed#: [facility] 707-01 Encounter: [id number]  SOD Team C       Salvador Shah [de-identified] y o  female [id number]    Assessment/Plan:    Active Problems:    Essential hypertension    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 native heart without angina pectoris    Type 2 diabetes mellitus with complication, with long-term current use of insulin (HCC)    Atrial flutter (HCC)    Hyperlipidemia    Gastroesophageal reflux disease without esophagitis    Moderate mitral stenosis    Acute respiratory failure with hypoxia (HCC)    Asthma    Asymptomatic carotid artery stenosis, bilateral    Atherosclerosis of artery of extremity with ulceration (HCC)    Hx of CABG    Chronic anticoagulation    Chronic combined systolic and diastolic congestive heart failure (HCC)    Postoperative hypothyroidism    Paroxysmal atrial fibrillation (HCC)    Hypokalemia    Hypocalcemia    Left bundle branch block (LBBB)    S/P TAVR (transcatheter aortic valve replacement)    Expressive aphasia    Multiple lacunar infarcts (HCC)    Vomiting    History of CVA (cerebrovascular accident)    Seizure ([facility] [address] )    Supratherapeutic INR    NSVT (nonsustained ventricular tachycardia) ([facility])    Pulmonary hypertension (HCC)    Anemia    Severe sepsis (HCC)    Lactic acidosis      Nonsustained ventricular tachycardia: This is likely due to cardiomyopathy  Patient has underlying ejection fraction of 35%  Continue to replete electrolytes  -Will give Mg 1g today   -Started on Amiodarone for suppression by cardiology (will need 200mg bid for 14 days and then reduced to 200mg daily)  Chronic combined systolic and diastolic heart failure with ejection fraction 35%:   -Continue Lasix 40mg BID   -Lisinopril has been restarted    New Onset Seizure:    -Patient had EMU which did not show any electrographic evidence of seizure      -MRI shows Stable cerebral volume loss and diffuse white matter changes most suggestive of extensive chronic microangiopathy  Enhancement of the previously seen small right occipital lobe infarct  There is a new diffusion abnormality measuring only 2 mm within the right centrum semiovale which may represent a small new focus of ischemia    -Seizure precautions   -continue Keppra, appreciate Neurology recommendations  cardioembolic stroke:    -previously failed Coumadin and transitioned to Xarelto     -continue Xarelto   -Patient needs mammogram performed as outpatient as per neurology recommendations  -CTA Chest/Abd/Pel reviewed with radiology last week  Atrial fibrillation/flutter:  Continue Xarelto and Lopressor  CAD: continue Lipitor, ASA, Lopressor, Lisinopril    HTN:  BP stable  Will continue to trend    DM2:  Goal blood glucose less than 180, A1c was 7 7% 2018    -continue sliding scale insulin   -Restarted Lantus home dose of 20 units BID  COPD: No acute exacerbation     -continue Flovent and albuterol  Hypothyroidism:  Continue Synthroid    Disposition:  Awaiting DC to rehab  Patient medically stable for DC  Subjective:   Patient has no complaints this morning  Overnight she did have a few episodes of nonsustained ventricular tachycardia, she was asymptomatic  Today she feels well  Denies any headaches, nausea, vomiting, chest pain, shortness of breath, abdominal pain  She has been ambulating with physical therapy  She is in good spirits  Tolerating p o  intake  Had a well-formed bowel movement yesterday  No melena or hematochezia         Vitals: Temp (24hrs), Av 2 °F (36 8 °C), Min:98 °F (36 7 °C), Max:98 5 °F (36 9 °C)  Current: Temperature: 98 °F (36 7 °C)  Vitals:    18 2121 18 2333 18 0600 18 0720   BP: 144/62 142/83  147/73   BP Location:  Left arm  Left arm   Pulse: 70 63  64   Resp:  18  18   Temp:  98 3 °F (36 8 °C)  98 °F (36 7 °C)   TempSrc:  Oral  Oral   SpO2:  97%  98%   Weight:   72 8 kg (160 lb 7 9 oz)    Height: Body mass index is 27 55 kg/m²  I/O last 24 hours: In: 240 [P O :240]  Out: 800 [Urine:800]      Physical Exam:     General: No acute distress, resting comfortably  HEENT: Normocephalic, atraumatic, EOMI, no scleral icterus  CV: RRR, +s1,s2, no MRG  Lungs: CTA B/L, no rales, rhonchi or wheezes  Abd: Soft, non-tender, non-distended, +BSx4, no rebound or guarding  Ext: No clubbing, cyanosis, or edema  Neuro: CN 2-12 grossly intact, speech fluent, MAEx4     Invasive Devices     Peripheral Intravenous Line            Peripheral IV 05/05/18 Left Wrist 2 days    Peripheral IV 05/07/18 Right Wrist less than 1 day                          Labs:   Recent Results (from the past 24 hour(s))   Fingerstick Glucose (POCT)    Collection Time: 05/06/18 10:57 AM   Result Value Ref Range    POC Glucose 271 (H) 65 - 140 mg/dl   Fingerstick Glucose (POCT)    Collection Time: 05/06/18  4:29 PM   Result Value Ref Range    POC Glucose 301 (H) 65 - 140 mg/dl   Fingerstick Glucose (POCT)    Collection Time: 05/06/18  9:11 PM   Result Value Ref Range    POC Glucose 352 (H) 65 - 140 mg/dl   Fingerstick Glucose (POCT)    Collection Time: 05/07/18  6:29 AM   Result Value Ref Range    POC Glucose 180 (H) 65 - 140 mg/dl   Basic metabolic panel    Collection Time: 05/07/18  8:01 AM   Result Value Ref Range    Sodium 139 136 - 145 mmol/L    Potassium 4 5 3 5 - 5 3 mmol/L    Chloride 103 100 - 108 mmol/L    CO2 30 21 - 32 mmol/L    Anion Gap 6 4 - 13 mmol/L    BUN 21 5 - 25 mg/dL    Creatinine 0 96 0 60 - 1 30 mg/dL    Glucose 216 (H) 65 - 140 mg/dL    Calcium 8 7 8 3 - 10 1 mg/dL    eGFR 56 ml/min/1 73sq m   Magnesium    Collection Time: 05/07/18  8:01 AM   Result Value Ref Range    Magnesium 1 8 1 6 - 2 6 mg/dL       Radiology Results: I have personally reviewed pertinent reports  Other Diagnostic Testing:   I have personally reviewed pertinent reports          Active Meds:   Current Facility-Administered Medications   Medication Dose Route Frequency    acetaminophen (TYLENOL) rectal suppository 650 mg  650 mg Rectal Q4H PRN    acetaminophen (TYLENOL) tablet 650 mg  650 mg Oral Q6H PRN    albuterol (PROVENTIL HFA,VENTOLIN HFA) inhaler 2 puff  2 puff Inhalation Q4H PRN    amiodarone tablet 200 mg  200 mg Oral BID With Meals    [START ON 5/19/2018] amiodarone tablet 200 mg  200 mg Oral Daily With Breakfast    aspirin chewable tablet 81 mg  81 mg Oral Daily    atorvastatin (LIPITOR) tablet 80 mg  80 mg Oral Daily With Dinner    ferrous sulfate tablet 325 mg  325 mg Oral Daily With Breakfast    fluticasone (FLOVENT HFA) 110 MCG/ACT inhaler 1 puff  1 puff Inhalation Q12H [address]    furosemide (LASIX) tablet 40 mg  40 mg Oral BID (diuretic)    influenza inactivated quadrivalent vaccine (FLULAVAL) IM injection 0 5 mL  0 5 mL Intramuscular Prior to discharge    insulin glargine (LANTUS) subcutaneous injection 20 Units 0 2 mL  20 Units Subcutaneous Q12H [address]    insulin lispro (HumaLOG) 100 units/mL subcutaneous injection 1-5 Units  1-5 Units Subcutaneous HS    insulin lispro (HumaLOG) 100 units/mL subcutaneous injection 1-6 Units  1-6 Units Subcutaneous TID With Meals    labetalol (NORMODYNE) injection 10 mg  10 mg Intravenous Q6H PRN    levETIRAcetam (KEPPRA) tablet 750 mg  750 mg Oral Q12H LANCE    levothyroxine tablet 100 mcg  100 mcg Oral Daily    lisinopril (ZESTRIL) tablet 5 mg  5 mg Oral Daily    loratadine (CLARITIN) tablet 10 mg  10 mg Oral Daily    LORazepam (ATIVAN) 2 mg/mL injection 2 mg  2 mg Intravenous PRN    magnesium sulfate IVPB (premix) SOLN 1 g  1 g Intravenous Once    metoclopramide (REGLAN) injection 10 mg  10 mg Intravenous Q6H PRN    metoprolol tartrate (LOPRESSOR) tablet 100 mg  100 mg Oral Q12H LANCE    nystatin (MYCOSTATIN) powder   Topical BID    omeprazole (PRILOSEC) suspension 2 mg/mL  20 mg Oral Daily    polyethylene glycol (MIRALAX) packet 17 g  17 g Oral Daily PRN    potassium chloride (K-DUR,KLOR-CON) CR tablet 40 mEq  40 mEq Oral BID    rivaroxaban (XARELTO) tablet 20 mg  20 mg Oral Daily With Dinner    senna-docusate sodium (SENOKOT S) 8 6-50 mg per tablet 1 tablet  1 tablet Oral HS         VTE Pharmacologic Prophylaxis:  Xarelto  VTE Mechanical Prophylaxis: sequential compression device    Nickolas Wayne DO

## 2018-05-07 NOTE — DISCHARGE SUMMARY
[facility] Discharge Summary - Jerry Ackerman [de-identified] y o  female MRN: [id number]    [address] [facility] 7 Room / Bed: [facility] 70/[facility] 071-34 Encounter: [id number]    BRIEF OVERVIEW    Admitting Provider: Jolene Hand MD  Discharge Provider: Keysha James MD  Primary Care Physician at Discharge: CLARENCE Rivera  [address]  Admission Date: 4/28/2018     Discharge Date: 05/07/18  Hospital Course  Patient is an 23-year-old female who was originally admitted on April 28th with altered mental status and weakness, however during this hospitalization she developed a witnessed tonic-clonic seizure for which she was subsequently intubated and transferred to the medical ICU  An EEG she was done at that time which showed no electrographic evidence of seizures  Subsequently she was transferred back to the medical floor, however on the medical floor she did develop a pulmonary edema requiring BiPAP and diuresis  There is also concern for sepsis secondary to H cap and she was initially started on antibiotics, however this was discontinued as her respiratory distress was due to volume overload  After intravenous diuresis her respiratory status improved, she was transferred back to medical floor, at which point her stay was uneventful  Also during this hospitalization she did have evidence of a new small focus of ischemia within the right centrum semiovale  She is being medically managed, was evaluated by Neurology  Previously she was on warfarin, however that she would was switched to Xarelto  Of note she recently had a TAVR  Lastly during this hospitalization she did have multiple episodes of nonsustained ventricular tachycardia, she was asymptomatic, she was evaluated by Cardiology who recommended amiodarone  She is to take amiodarone 200 mg b i d  for 14 days, then the doses to be reduced to 200 mg once daily    This is been highlighted in her discharge instructions  She can follow up with her cardiologist Dr Vic Boyd as an outpatient  Presenting Problem/History of Present Illness  Principal Problem (Resolved):    Encephalopathy acute  Active Problems:    Essential hypertension    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 native heart without angina pectoris    Type 2 diabetes mellitus with complication, with long-term current use of insulin (HCC)    Atrial flutter (HCC)    Hyperlipidemia    Gastroesophageal reflux disease without esophagitis    Moderate mitral stenosis    Acute respiratory failure with hypoxia (HCC)    Asthma    Asymptomatic carotid artery stenosis, bilateral    Atherosclerosis of artery of extremity with ulceration (HCC)    Hx of CABG    Chronic anticoagulation    Chronic combined systolic and diastolic congestive heart failure (HCC)    Postoperative hypothyroidism    Paroxysmal atrial fibrillation (HCC)    Hypokalemia    Hypocalcemia    Left bundle branch block (LBBB)    S/P TAVR (transcatheter aortic valve replacement)    Expressive aphasia    Multiple lacunar infarcts (HCC)    Vomiting    History of CVA (cerebrovascular accident)    Seizure ([facility] [address] )    Supratherapeutic INR    NSVT (nonsustained ventricular tachycardia) (HCC)    Pulmonary hypertension (HCC)    Anemia    Severe sepsis (HCC)    Lactic acidosis  Resolved Problems:    Altered mental status        Diagnostic Procedures Performed  Imaging Studies:  Ct Abdomen Pelvis Wo Contrast    Result Date: 4/28/2018  Impression: No acute abdominal pelvic pathology  No significant change from prior  Workstation performed: [id number]     Xr Chest Portable    Result Date: 5/1/2018  Impression: Extensive bilateral hazy airspace disease throughout both lungs with a small left basilar effusion may indicate CHF  Multilobar pneumonia is less likely part of the differential diagnosis   Workstation performed: [id number]     Xr Chest Portable    Result Date: 4/30/2018  Impression: 1  Tip of right IJ catheter in superior vena cava  2   No evidence of pneumothorax  3   Small left pleural effusion and atelectasis or consolidation in the base of the left lower lobe  Workstation performed: [id number]     Xr Chest Portable    Result Date: 4/29/2018  Impression: Mild vascular congestion  Workstation performed: [id number]     Xr Chest 2 Views    Result Date: 4/28/2018  Impression: No acute cardiopulmonary disease  Workstation performed: [id number]     Ct Head Wo Contrast    Result Date: 4/29/2018  Impression: No acute intracranial abnormality  Microangiopathic changes  Workstation performed: [id number]     Ct Head Without Contrast    Result Date: 4/28/2018  Impression: No acute intracranial abnormality  Workstation performed: [id number]     Xr Chest Portable- Icu    Result Date: 4/29/2018  Impression: 1  Cardiomegaly and pulmonary vascular congestion  2   Left basilar opacity compatible with effusion and a component of atelectasis or infection  Workstation performed: [id number]     Mri Brain Seizure Wo And W Contrast    Result Date: 5/3/2018  Impression: Stable cerebral volume loss and diffuse white matter changes most suggestive of extensive chronic microangiopathy  Enhancement of the previously seen small right occipital lobe infarct  There is a new diffusion abnormality measuring only 2 mm within the right centrum semiovale which may represent a small new focus of ischemia   Workstation performed: [id number]         Test Results Pending at Discharge: None    Medications     Your Medications     STOP taking these medications     STOP taking these medications    BANOPHEN 50 mg capsule   Generic drug: diphenhydrAMINE     dextromethorphan-guaiFENesin  mg/5 mL syrup   Commonly known as: ROBITUSSIN DM     pantoprazole 40 mg tablet   Commonly known as: PROTONIX    TAKE these medications     TAKE these medications     Morning Afternoon Evening Bedtime As Needed acetaminophen 650 mg CR tablet   Commonly known as: TYLENOL   Take 1 tablet (650 mg total) by mouth every 8 (eight) hours as needed for mild pain Do not take in conjunction with Percocet     Refills: 0                    * amiodarone 200 mg tablet   Take 1 tablet (200 mg total) by mouth 2 (two) times a day with meals for 22 doses   Refills: 0                    * amiodarone 200 mg tablet   Start taking on: 5/19/2018   Take 1 tablet (200 mg total) by mouth daily with breakfast   Refills: 0                    aspirin 81 MG tablet   Take 81 mg by mouth daily   Refills: 0                    atorvastatin 80 mg tablet   Commonly known as: LIPITOR   Take 1 tablet (80 mg total) by mouth daily   Refills: 2                    cholecalciferol 1,000 units tablet   Commonly known as: VITAMIN D3   Take 1,000 Units by mouth daily   Refills: 0                    docusate sodium 100 mg capsule   Commonly known as: COLACE   For: Constipation   Take 1 capsule (100 mg total) by mouth 2 (two) times a day   Refills: 0                    esomeprazole 20 mg capsule   Commonly known as: NexIUM   Take 20 mg by mouth daily in the early morning   Refills: 0                    ferrous sulfate 325 (65 Fe) mg tablet   Start taking on: 5/8/2018   Take 1 tablet (325 mg total) by mouth daily with breakfast   Refills: 0                    fluticasone 50 mcg/act nasal spray   Commonly known as: FLONASE   1 spray into each nostril daily   Refills: 0                    furosemide 40 mg tablet   Commonly known as: LASIX   Take 40 mg by mouth 2 (two) times a day   Refills: 0                    glucose blood test strip   1 each by Other route 4 (four) times a day (before meals and at bedtime) Use as instructed   Refills: 0                    LANTUS 100 units/mL subcutaneous injection   Generic drug: insulin glargine   Inject 20 Units under the skin 2 (two) times a day   Refills: 0   What changed: how much to take                    levETIRAcetam 750 mg tablet   Commonly known as: KEPPRA   Take 1 tablet (750 mg total) by mouth every 12 (twelve) hours   Refills: 0                    levothyroxine 100 mcg tablet   Take 100 mcg by mouth daily   Refills: 0                    lisinopril 5 mg tablet   Commonly known as: ZESTRIL   Take 2 5 mg by mouth daily at bedtime   Refills: 0                    loratadine 10 mg tablet   Commonly known as: CLARITIN   Take 1 tablet (10 mg total) by mouth daily   Refills: 0                    metFORMIN 1000 MG tablet   Commonly known as: GLUCOPHAGE   1,000 mg 2 (two) times a day   Refills: 0                    metoprolol tartrate 100 mg tablet   Commonly known as: LOPRESSOR   Take 1 tablet (100 mg total) by mouth every 12 (twelve) hours   Refills: 0                    Mometasone Furoate 100 MCG/ACT Aero   Commonly known as: ASMANEX HFA   Inhale 2 puffs once daily   Refills: 5                    NITROSTAT 0 4 mg SL tablet   Generic drug: nitroglycerin   Place 0 4 mg under the tongue every 3 (three) minutes as needed   Refills: 0                    polyethylene glycol 17 g packet   Commonly known as: MIRALAX   Take 17 g by mouth daily   Refills: 0                    potassium chloride 10 mEq tablet   Commonly known as: K-DUR,KLOR-CON   Take 10 mEq by mouth 2 (two) times a day   Refills: 0                    rivaroxaban 20 mg tablet   Commonly known as: XARELTO   Take 1 tablet (20 mg total) by mouth daily   Refills: 0                    TUMS 500 mg chewable tablet   Generic drug: calcium carbonate   Chew 2 tablets 3 (three) times a day   Refills: 0                    VENTOLIN HFA 90 mcg/act inhaler   Generic drug: albuterol   Inhale 108 mcg 2 (two) times a day as needed 2 puffs bid PRN   Refills: 0             Allergies  Allergies   Allergen Reactions    Other Chest Pain     IVP-listed as "chest pain" in previous chart, patient stated this occurred "a long time ago" but does not remember exactly occurred     Cephalosporins Chest Pain    Contrast [Iodinated Diagnostic Agents] Other (See Comments)     Flash pulm edema    Doxycycline Chest Pain    Levaquin [Levofloxacin] Chest Pain    Ondansetron Chest Pain     Prolonged QT    Toradol [Ketorolac Tromethamine] Chest Pain     Discharge Diet: Diet Dysphagia/Modified Consistency; Dysphagia 3-Dental Soft; Thin Liquid; Fluid Restriction 2000 ML, Sodium 2 Gm, Consistent Carbohydrate Diet Level 1 (4 carb servings/60 grams CHO/meal) starting at 05/07 1202  Activity restrictions: Per Rehab rec's  Discharge Condition: fair  Discharged With Lines: no    Discharge Disposition: SNF  Facility / Family Member Name: [facility]      Code Status: Level 1 - Full Code  Advance Directive and Living Will: <no information>  Power of :    POLST:      Discharge  Statement   I spent 45 minutes minutes discharging the patient  This time was spent on the day of discharge  I had direct contact with the patient on the day of discharge  Additional documentation is required if more than 30 minutes were spent on discharge

## 2018-05-07 NOTE — PLAN OF CARE
Problem: SLP ADULT - SWALLOWING, IMPAIRED  Goal: Advance to least restrictive diet without signs or symptoms of aspiration for planned discharge setting  See evaluation for individualized goals    Outcome: Completed Date Met: 05/07/18

## 2018-05-07 NOTE — PROGRESS NOTES
Cardiology Progress Note - Salvador Shah [de-identified] y o  female MRN: [id number]    [facility] Encounter: [id number]      Assessment/Recommendations:  1  NSVT: continues to have brief episodes - now started on amiodarone  Continue to replete K to keep >4 and Mg to keep >2  Continued on high dose B-blocker as well  2   Chronic combined systolic/diastolic CHF: EF 65%, appears euvolemic and transitioned to PO lasix  Continued on B-blocker and ACE-I   EF was 50% after TAVR, but seemed to decline again - perhaps related to underlying CAD and acute illness  Would repeat echo as outpatient to evaluate for return of function  3   CAD: stable and asymptomatic  Continued on B-blocker, asa, and statin  4   Severe AS s/p TAVR: stable on echo, continue B-blocker and ASA  5   New onset seizure: new ischemic focus seen on brain MRI - now continued on xarelto instead of warfarin  6   Paroxysmal afib: perhaps could be reason for ischemic lesion on brain MRI  Now on xarelto instead of warfarin  Continues on B-blocker  Subjective:   Patient seen and examined  No significant events overnight   ; pertinent negatives - chest pain, chest pressure/discomfort, dyspnea, irregular heart beat and palpitations  Objective:     Vitals: Blood pressure 147/73, pulse 64, temperature 98 °F (36 7 °C), temperature source Oral, resp  rate 18, height 5' 4" (1 626 m), weight 72 8 kg (160 lb 7 9 oz), SpO2 98 %, not currently breastfeeding , Body mass index is 27 55 kg/m² , Orthostatic Blood Pressures      Most Recent Value   Blood Pressure  147/73 filed at 05/07/2018 0720   Patient Position - Orthostatic VS  Lying filed at 05/07/2018 0720            Intake/Output Summary (Last 24 hours) at 05/07/18 0936  Last data filed at 05/07/18 0900   Gross per 24 hour   Intake              360 ml   Output              800 ml   Net             -440 ml       TELE: NSVT      Physical Exam:    GEN: Salvador Shah appears well, alert and oriented x 3, pleasant and cooperative   HEENT: pupils equal, round, and reactive to light; extraocular muscles intact  NECK: supple, no carotid bruits   HEART: regular rhythm, normal S1 and S2, +systolic murmur, no clicks, gallops or rubs   LUNGS: clear to auscultation bilaterally; no wheezes, rales, or rhonchi   ABDOMEN: normal bowel sounds, soft, no tenderness, no distention  EXTREMITIES: peripheral pulses normal; no clubbing, cyanosis, or edema  NEURO: no focal findings   SKIN: normal without suspicious lesions on exposed skin    Medications:      Current Facility-Administered Medications:     acetaminophen (TYLENOL) rectal suppository 650 mg, 650 mg, Rectal, Q4H PRN, Jeff Schwartz PA-C    acetaminophen (TYLENOL) tablet 650 mg, 650 mg, Oral, Q6H PRN, Marlene Tyler DO, 650 mg at 04/30/18 0949    albuterol (PROVENTIL HFA,VENTOLIN HFA) inhaler 2 puff, 2 puff, Inhalation, Q4H PRN, Bushra Horn MD    amiodarone tablet 200 mg, 200 mg, Oral, BID With Meals, Negin Núñez MD, 200 mg at 05/07/18 0820    [START ON 5/19/2018] amiodarone tablet 200 mg, 200 mg, Oral, Daily With Breakfast, Negin Núñez MD    aspirin chewable tablet 81 mg, 81 mg, Oral, Daily, Matilde Chambers DO, 81 mg at 05/07/18 0820    atorvastatin (LIPITOR) tablet 80 mg, 80 mg, Oral, Daily With Dinner, Melina Mg MD, 80 mg at 05/06/18 1655    ferrous sulfate tablet 325 mg, 325 mg, Oral, Daily With Breakfast, Max Jean-Baptiste DO, 325 mg at 05/07/18 0820    fluticasone (FLOVENT HFA) 110 MCG/ACT inhaler 1 puff, 1 puff, Inhalation, Q12H [facility], Melina Mg MD, 1 puff at 05/07/18 0821    furosemide (LASIX) tablet 40 mg, 40 mg, Oral, BID (diuretic), Negin Núñez MD, 40 mg at 05/07/18 0820    influenza inactivated quadrivalent vaccine (FLULAVAL) IM injection 0 5 mL, 0 5 mL, Intramuscular, Prior to discharge, Bushra Hron MD    insulin glargine (LANTUS) subcutaneous injection 20 Units 0 2 mL, 20 Units, Subcutaneous, Q12H [facility], Tomas Melo DO, 20 Units at 05/07/18 0847    insulin lispro (HumaLOG) 100 units/mL subcutaneous injection 1-5 Units, 1-5 Units, Subcutaneous, HS, Magalis Schultz MD, 4 Units at 05/06/18 2119    insulin lispro (HumaLOG) 100 units/mL subcutaneous injection 1-6 Units, 1-6 Units, Subcutaneous, TID With Meals, 1 Units at 05/07/18 0821 **AND** Fingerstick Glucose (POCT), , , 4 times day, Bucyrus Edu, DO    labetalol (NORMODYNE) injection 10 mg, 10 mg, Intravenous, Q6H PRN, Cori Small Bashor, DO, 10 mg at 05/06/18 0800    levETIRAcetam (KEPPRA) tablet 750 mg, 750 mg, Oral, Q12H [facility], RAMO Benton, 750 mg at 05/07/18 0820    levothyroxine tablet 100 mcg, 100 mcg, Oral, Daily, Melina Mg MD, 100 mcg at 05/07/18 0501    lisinopril (ZESTRIL) tablet 5 mg, 5 mg, Oral, Daily, Negin Núñez MD, 5 mg at 05/07/18 0820    loratadine (CLARITIN) tablet 10 mg, 10 mg, Oral, Daily, Vilma Bolaños MD, 10 mg at 05/07/18 0820    LORazepam (ATIVAN) 2 mg/mL injection 2 mg, 2 mg, Intravenous, PRN, Cori Alexis Bashor, DO    magnesium sulfate IVPB (premix) SOLN 1 g, 1 g, Intravenous, Once, [facility], DO    metoclopramide (REGLAN) injection 10 mg, 10 mg, Intravenous, Q6H PRN, Melina Mg MD, 10 mg at 04/29/18 0110    metoprolol tartrate (LOPRESSOR) tablet 100 mg, 100 mg, Oral, Q12H [facility], Marleny Tolbert MD, 100 mg at 05/07/18 0820    nystatin (MYCOSTATIN) powder, , Topical, BID, Cori Alexis Bashor, DO, 1 application at 36/65/33 4363    omeprazole (PRILOSEC) suspension 2 mg/mL, 20 mg, Oral, Daily, Flaco Fullerr DO, 20 mg at 05/07/18 6801    polyethylene glycol (MIRALAX) packet 17 g, 17 g, Oral, Daily PRN, Joe Lemus MD    potassium chloride (K-DUR,KLOR-CON) CR tablet 40 mEq, 40 mEq, Oral, BID, Marleny Tolbert MD, 40 mEq at 05/07/18 0820    rivaroxaban (XARELTO) tablet 20 mg, 20 mg, Oral, Daily With Dinner, Matilde Chambers DO, 20 mg at 05/06/18 1656    senna-docusate sodium (SENOKOT S) 8 6-50 mg per tablet 1 tablet, 1 tablet, Oral, HS, Ashlie James, MAGDALENO, 1 tablet at 05/06/18 2121     Labs & Results:      Results from last 7 days  Lab Units 05/01/18  1621   TROPONIN I ng/mL 0 03       Results from last 7 days  Lab Units 05/04/18  1518 05/02/18  0452 05/01/18  1621   WBC Thousand/uL 6 77 8 28 14 78*   HEMOGLOBIN g/dL 9 5* 7 9* 9 0*   HEMATOCRIT % 32 3* 26 9* 29 9*   PLATELETS Thousands/uL 277 218 318           Results from last 7 days  Lab Units 05/07/18  0801 05/06/18  0604 05/05/18  0551 05/04/18  1518  05/01/18  1757   SODIUM mmol/L 139 137 140 142  < > 140   POTASSIUM mmol/L 4 5 4 7 4 2 4 0  < > 4 0   CHLORIDE mmol/L 103 102 104 103  < > 106   CO2 mmol/L 30 30 29 31  < > 26   BUN mg/dL 21 17 15 12  < > 12   CREATININE mg/dL 0 96 0 85 0 78 0 90  < > 0 85   CALCIUM mg/dL 8 7 8 0* 7 5* 7 5*  < > 7 0*   TOTAL PROTEIN g/dL  --   --   --  6 5  --  6 8   BILIRUBIN TOTAL mg/dL  --   --   --  0 55  --  0 79   ALK PHOS U/L  --   --   --  75  --  95   ALT U/L  --   --   --  21  --  23   AST U/L  --   --   --  23  --  44   GLUCOSE RANDOM mg/dL 216* 208* 197* 292*  < > 315*   < > = values in this interval not displayed  Results from last 7 days  Lab Units 05/01/18  1757 05/01/18  0542   INR  1 75* 1 61*       Results from last 7 days  Lab Units 05/07/18  0801 05/06/18  0604 05/05/18  1449   MAGNESIUM mg/dL 1 8 1 9 1 8       Echo: personally reviewed - EF 35% with diffuse HK and severe HK on inferior and IS walls  Moderately dilated RV with reduced function  Moderate MS, mild MR  Bio AVR with mean gradient of 8 mmHg  PASP 70 mmHg        EKG personally reviewed by Chanda Key MD

## 2018-05-08 ENCOUNTER — TELEPHONE (OUTPATIENT)
Dept: NEUROLOGY | Facility: CLINIC | Age: 78
End: 2018-05-08

## 2018-05-08 ENCOUNTER — TELEPHONE (OUTPATIENT)
Dept: INTERNAL MEDICINE CLINIC | Age: 78
End: 2018-05-08

## 2018-05-08 ENCOUNTER — TRANSITIONAL CARE MANAGEMENT (OUTPATIENT)
Dept: INTERNAL MEDICINE CLINIC | Facility: CLINIC | Age: 78
End: 2018-05-08

## 2018-05-08 LAB — BACTERIA BLD CULT: NORMAL

## 2018-05-08 NOTE — TELEPHONE ENCOUNTER
1st attempt and lmom for patient to call to make an appointment for SHON chiang/c from [facility] yesterday 05/07/2018 for stroke

## 2018-05-15 ENCOUNTER — TELEPHONE (OUTPATIENT)
Dept: INTERNAL MEDICINE CLINIC | Facility: CLINIC | Age: 78
End: 2018-05-15

## 2018-05-15 ENCOUNTER — TELEPHONE (OUTPATIENT)
Dept: INTERNAL MEDICINE CLINIC | Age: 78
End: 2018-05-15

## 2018-05-15 ENCOUNTER — TELEPHONE (OUTPATIENT)
Dept: CARDIOLOGY CLINIC | Facility: CLINIC | Age: 78
End: 2018-05-15

## 2018-05-15 NOTE — TELEPHONE ENCOUNTER
Dr Sonya Reilly called from [facility] where pt returned to on 5/7 for rehab  She wanted to give an update on pt  She had persistent nausea and vomiting, and was found to be in acute renal failure  Interventions:    Given one liter IV fluids  Fluid restrictins d/c'd  Lasix decreased to 40 mg q AM  Holding lisinopril x 3 days  Decreased amiodarone to once daily      Dr Gill Willard can be reached at [phone] X [phone]

## 2018-05-15 NOTE — TELEPHONE ENCOUNTER
Patient will be D/C to home tomorrow from [address] she was in [facility] 04/28/2018-05/07/2018 she was in [address] from 05/07/2018-05/16/2018 she will be sent home tomorrow

## 2018-05-18 ENCOUNTER — APPOINTMENT (EMERGENCY)
Dept: RADIOLOGY | Facility: HOSPITAL | Age: 78
End: 2018-05-18
Payer: MEDICARE

## 2018-05-18 ENCOUNTER — HOSPITAL ENCOUNTER (EMERGENCY)
Facility: HOSPITAL | Age: 78
Discharge: HOME | End: 2018-05-18
Attending: EMERGENCY MEDICINE | Admitting: EMERGENCY MEDICINE
Payer: MEDICARE

## 2018-05-18 VITALS
RESPIRATION RATE: 18 BRPM | DIASTOLIC BLOOD PRESSURE: 66 MMHG | OXYGEN SATURATION: 99 % | TEMPERATURE: 97.8 F | SYSTOLIC BLOOD PRESSURE: 153 MMHG | HEART RATE: 67 BPM

## 2018-05-18 DIAGNOSIS — R79.89 PRERENAL AZOTEMIA: ICD-10-CM

## 2018-05-18 DIAGNOSIS — N17.9 AKI (ACUTE KIDNEY INJURY) (HCC): ICD-10-CM

## 2018-05-18 DIAGNOSIS — A08.4 VIRAL GASTROENTERITIS: Primary | ICD-10-CM

## 2018-05-18 LAB
ALBUMIN SERPL BCP-MCNC: 3.2 G/DL (ref 3.5–5)
ALP SERPL-CCNC: 59 U/L (ref 46–116)
ALT SERPL W P-5'-P-CCNC: 20 U/L (ref 12–78)
ANION GAP SERPL CALCULATED.3IONS-SCNC: 7 MMOL/L (ref 4–13)
ANION GAP SERPL CALCULATED.3IONS-SCNC: 9 MMOL/L (ref 4–13)
AST SERPL W P-5'-P-CCNC: 46 U/L (ref 5–45)
ATRIAL RATE: 68 BPM
BACTERIA UR QL AUTO: ABNORMAL /HPF
BASOPHILS # BLD AUTO: 0.03 THOUSANDS/ΜL (ref 0–0.1)
BASOPHILS NFR BLD AUTO: 0 % (ref 0–1)
BILIRUB SERPL-MCNC: 0.49 MG/DL (ref 0.2–1)
BILIRUB UR QL STRIP: NEGATIVE
BUN SERPL-MCNC: 38 MG/DL (ref 5–25)
BUN SERPL-MCNC: 40 MG/DL (ref 5–25)
CALCIUM SERPL-MCNC: 6.6 MG/DL (ref 8.3–10.1)
CALCIUM SERPL-MCNC: 6.8 MG/DL (ref 8.3–10.1)
CHLORIDE SERPL-SCNC: 102 MMOL/L (ref 100–108)
CHLORIDE SERPL-SCNC: 106 MMOL/L (ref 100–108)
CLARITY UR: CLEAR
CO2 SERPL-SCNC: 24 MMOL/L (ref 21–32)
CO2 SERPL-SCNC: 25 MMOL/L (ref 21–32)
COLOR UR: YELLOW
CREAT SERPL-MCNC: 1.65 MG/DL (ref 0.6–1.3)
CREAT SERPL-MCNC: 1.89 MG/DL (ref 0.6–1.3)
EOSINOPHIL # BLD AUTO: 0.3 THOUSAND/ΜL (ref 0–0.61)
EOSINOPHIL NFR BLD AUTO: 3 % (ref 0–6)
ERYTHROCYTE [DISTWIDTH] IN BLOOD BY AUTOMATED COUNT: 21 % (ref 11.6–15.1)
GFR SERPL CREATININE-BSD FRML MDRD: 25 ML/MIN/1.73SQ M
GFR SERPL CREATININE-BSD FRML MDRD: 29 ML/MIN/1.73SQ M
GLUCOSE SERPL-MCNC: 109 MG/DL (ref 65–140)
GLUCOSE SERPL-MCNC: 187 MG/DL (ref 65–140)
GLUCOSE UR STRIP-MCNC: NEGATIVE MG/DL
HCT VFR BLD AUTO: 33.9 % (ref 34.8–46.1)
HGB BLD-MCNC: 10.1 G/DL (ref 11.5–15.4)
HGB UR QL STRIP.AUTO: NEGATIVE
HOLD SPECIMEN: NORMAL
HYALINE CASTS #/AREA URNS LPF: ABNORMAL /LPF
KETONES UR STRIP-MCNC: NEGATIVE MG/DL
LEUKOCYTE ESTERASE UR QL STRIP: ABNORMAL
LIPASE SERPL-CCNC: 339 U/L (ref 73–393)
LYMPHOCYTES # BLD AUTO: 1.63 THOUSANDS/ΜL (ref 0.6–4.47)
LYMPHOCYTES NFR BLD AUTO: 18 % (ref 14–44)
MCH RBC QN AUTO: 21.1 PG (ref 26.8–34.3)
MCHC RBC AUTO-ENTMCNC: 29.8 G/DL (ref 31.4–37.4)
MCV RBC AUTO: 71 FL (ref 82–98)
MONOCYTES # BLD AUTO: 0.54 THOUSAND/ΜL (ref 0.17–1.22)
MONOCYTES NFR BLD AUTO: 6 % (ref 4–12)
NEUTROPHILS # BLD AUTO: 6.8 THOUSANDS/ΜL (ref 1.85–7.62)
NEUTS SEG NFR BLD AUTO: 73 % (ref 43–75)
NITRITE UR QL STRIP: NEGATIVE
NON-SQ EPI CELLS URNS QL MICRO: ABNORMAL /HPF
NRBC BLD AUTO-RTO: 0 /100 WBCS
P AXIS: 79 DEGREES
PH UR STRIP.AUTO: 5 [PH] (ref 4.5–8)
PLATELET # BLD AUTO: 218 THOUSANDS/UL (ref 149–390)
POTASSIUM SERPL-SCNC: 4.4 MMOL/L (ref 3.5–5.3)
POTASSIUM SERPL-SCNC: 4.9 MMOL/L (ref 3.5–5.3)
PR INTERVAL: 320 MS
PROT SERPL-MCNC: 7.3 G/DL (ref 6.4–8.2)
PROT UR STRIP-MCNC: NEGATIVE MG/DL
QRS AXIS: -73 DEGREES
QRSD INTERVAL: 166 MS
QT INTERVAL: 508 MS
QTC INTERVAL: 540 MS
RBC # BLD AUTO: 4.79 MILLION/UL (ref 3.81–5.12)
RBC #/AREA URNS AUTO: ABNORMAL /HPF
SODIUM SERPL-SCNC: 135 MMOL/L (ref 136–145)
SODIUM SERPL-SCNC: 138 MMOL/L (ref 136–145)
SP GR UR STRIP.AUTO: 1.01 (ref 1–1.03)
T WAVE AXIS: 104 DEGREES
TROPONIN I SERPL-MCNC: 0.02 NG/ML
UROBILINOGEN UR QL STRIP.AUTO: 0.2 E.U./DL
VENTRICULAR RATE: 68 BPM
WBC # BLD AUTO: 9.31 THOUSAND/UL (ref 4.31–10.16)
WBC #/AREA URNS AUTO: ABNORMAL /HPF

## 2018-05-18 PROCEDURE — 80053 COMPREHEN METABOLIC PANEL: CPT | Performed by: EMERGENCY MEDICINE

## 2018-05-18 PROCEDURE — 83690 ASSAY OF LIPASE: CPT | Performed by: EMERGENCY MEDICINE

## 2018-05-18 PROCEDURE — 85025 COMPLETE CBC W/AUTO DIFF WBC: CPT | Performed by: EMERGENCY MEDICINE

## 2018-05-18 PROCEDURE — 36415 COLL VENOUS BLD VENIPUNCTURE: CPT

## 2018-05-18 PROCEDURE — 96374 THER/PROPH/DIAG INJ IV PUSH: CPT

## 2018-05-18 PROCEDURE — 81001 URINALYSIS AUTO W/SCOPE: CPT

## 2018-05-18 PROCEDURE — 81002 URINALYSIS NONAUTO W/O SCOPE: CPT | Performed by: EMERGENCY MEDICINE

## 2018-05-18 PROCEDURE — 80048 BASIC METABOLIC PNL TOTAL CA: CPT | Performed by: EMERGENCY MEDICINE

## 2018-05-18 PROCEDURE — 93005 ELECTROCARDIOGRAM TRACING: CPT

## 2018-05-18 PROCEDURE — 93010 ELECTROCARDIOGRAM REPORT: CPT | Performed by: INTERNAL MEDICINE

## 2018-05-18 PROCEDURE — 99284 EMERGENCY DEPT VISIT MOD MDM: CPT

## 2018-05-18 PROCEDURE — 96361 HYDRATE IV INFUSION ADD-ON: CPT

## 2018-05-18 PROCEDURE — 84484 ASSAY OF TROPONIN QUANT: CPT | Performed by: EMERGENCY MEDICINE

## 2018-05-18 PROCEDURE — 74176 CT ABD & PELVIS W/O CONTRAST: CPT

## 2018-05-18 RX ORDER — METOCLOPRAMIDE HYDROCHLORIDE 5 MG/ML
10 INJECTION INTRAMUSCULAR; INTRAVENOUS ONCE
Status: COMPLETED | OUTPATIENT
Start: 2018-05-18 | End: 2018-05-18

## 2018-05-18 RX ORDER — METOCLOPRAMIDE 10 MG/1
5 TABLET ORAL EVERY 6 HOURS PRN
Qty: 30 TABLET | Refills: 0 | Status: SHIPPED | OUTPATIENT
Start: 2018-05-18 | End: 2018-05-29 | Stop reason: SDUPTHER

## 2018-05-18 RX ORDER — PANTOPRAZOLE SODIUM 40 MG/1
40 TABLET, DELAYED RELEASE ORAL DAILY
COMMUNITY
End: 2018-06-22 | Stop reason: ALTCHOICE

## 2018-05-18 RX ADMIN — SODIUM CHLORIDE 500 ML: 0.9 INJECTION, SOLUTION INTRAVENOUS at 12:05

## 2018-05-18 RX ADMIN — METOCLOPRAMIDE 10 MG: 5 INJECTION, SOLUTION INTRAMUSCULAR; INTRAVENOUS at 10:26

## 2018-05-18 RX ADMIN — SODIUM CHLORIDE 500 ML: 0.9 INJECTION, SOLUTION INTRAVENOUS at 10:26

## 2018-05-18 NOTE — ED PROVIDER NOTES
History  Chief Complaint   Patient presents with    Vomiting     vomiting x5 days  78-year-old female with numerous medical comorbidities as detailed below presenting with a 6 day history of feeling generally unwell as well as a 5 day history of nausea and vomiting  As reviewed below, patient has numerous comorbid conditions, most significant for coronary artery disease status post CABG, combined systolic and diastolic CHF, aortic stenosis status post TAVR, atrial flutter anticoagulated on Xarelto, diabetes mellitus on insulin, hypertension, hyperlipidemia  Patient was recently hospitalized and had a protracted hospital course including stays in the ICU  Patient recently received TAVR in early April 2018  She then was readmitted experienced a generalized tonic-clonic seizure which required intubation and ICU admission  Patient was transferred from the ICU and then developed pulmonary edema which required BiPAP  Patient presents today with 6 days of feeling "generally unwell "  Patient cannot specify further  She states that for the past 5 days she has had nausea with 2-3 episodes of nonbloody, nonbilious emesis daily  Patient states this does not occur at a particular time of day  Patient had not had any abdominal pain until the day of presentation when she experienced 2 minutes of generalized abdominal pain  This resolved without intervention  Patient reports associated constipation  She has not had a bowel movement for 3 days  Prior to this, patient had an episode of "runny stool" which was not bloody  Patient has not eaten much in the past few days  Patient reports a history of numerous abdominal surgeries  This includes appendectomy, cholecystectomy, and hysterectomy  Patient denies any other symptoms on review of systems  She has no fevers  No neurological symptoms or vision changes  No chest pain or new shortness of breath  No urinary symptoms  No peripheral edema    Patient does note chronic orthopnea; she sleeps with 3 pillows every night  Patient had an episode of what sounds like paroxysmal nocturnal dyspnea last night  Plan:  Nausea and vomiting in an 78-year-old female with numerous comorbid conditions  Examination relatively benign  Vital signs within normal limits  We will obtain CBC, CMP, lipase, troponin, EKG, and CT of the abdomen and pelvis  We will also treat the patient with IV fluids judiciously given her history of CHF and IV antiemetics  Prior to Admission Medications   Prescriptions Last Dose Informant Patient Reported? Taking? LANTUS 100 UNIT/ML subcutaneous injection   No Yes   Sig: Inject 20 Units under the skin 2 (two) times a day   Mometasone Furoate (ASMANEX HFA) 100 MCG/ACT AERO  Self No Yes   Sig: Inhale 2 puffs once daily   acetaminophen (TYLENOL) 650 mg CR tablet   No Yes   Sig: Take 1 tablet (650 mg total) by mouth every 8 (eight) hours as needed for mild pain Do not take in conjunction with Percocet     albuterol (VENTOLIN HFA) 90 mcg/act inhaler  Self Yes Yes   Sig: Inhale 108 mcg 2 (two) times a day as needed 2 puffs bid PRN   amiodarone 200 mg tablet   No Yes   Sig: Take 1 tablet (200 mg total) by mouth daily with breakfast   aspirin 81 MG tablet  Self Yes Yes   Sig: Take 81 mg by mouth daily   atorvastatin (LIPITOR) 80 mg tablet   No Yes   Sig: Take 1 tablet (80 mg total) by mouth daily   calcium carbonate (TUMS) 500 mg chewable tablet  Self Yes Yes   Sig: Chew 2 tablets 3 (three) times a day     cholecalciferol (VITAMIN D3) 1,000 units tablet  Self Yes Yes   Sig: Take 1,000 Units by mouth daily     docusate sodium (COLACE) 100 mg capsule   No Yes   Sig: Take 1 capsule (100 mg total) by mouth 2 (two) times a day   fluticasone (FLONASE) 50 mcg/act nasal spray   Yes Yes   Si spray into each nostril daily   furosemide (LASIX) 40 mg tablet   Yes Yes   Sig: Take 40 mg by mouth daily     glucose blood test strip   Yes Yes   Si each by Other route 4 (four) times a day (before meals and at bedtime) Use as instructed   levETIRAcetam (KEPPRA) 750 mg tablet   No Yes   Sig: Take 1 tablet (750 mg total) by mouth every 12 (twelve) hours   levothyroxine 100 mcg tablet  Self Yes Yes   Sig: Take 100 mcg by mouth daily   lisinopril (ZESTRIL) 5 mg tablet  Self Yes Yes   Sig: Take 2 5 mg by mouth daily at bedtime     loratadine (CLARITIN) 10 mg tablet  Self No Yes   Sig: Take 1 tablet (10 mg total) by mouth daily   metFORMIN (GLUCOPHAGE) 1000 MG tablet  Self Yes Yes   Si,000 mg 2 (two) times a day   metoprolol tartrate (LOPRESSOR) 100 mg tablet   No Yes   Sig: Take 1 tablet (100 mg total) by mouth every 12 (twelve) hours   nitroglycerin (NITROSTAT) 0 4 mg SL tablet  Self Yes Yes   Sig: Place 0 4 mg under the tongue every 3 (three) minutes as needed   pantoprazole (PROTONIX) 40 mg tablet   Yes Yes   Sig: Take 40 mg by mouth daily   polyethylene glycol (MIRALAX) 17 g packet   Yes Yes   Sig: Take 17 g by mouth daily   potassium chloride (K-DUR,KLOR-CON) 10 mEq tablet   Yes Yes   Sig: Take 10 mEq by mouth 2 (two) times a day   rivaroxaban (XARELTO) 20 mg tablet   No Yes   Sig: Take 1 tablet (20 mg total) by mouth daily      Facility-Administered Medications: None       Past Medical History:   Diagnosis Date    Altered mental status     Anticoagulated     Coumadin for Aflutter    Asthma     Atrial flutter (HCC)     maintained on coumadin anticoag    CAD (coronary artery disease)     Candidiasis     Carotid stenosis, bilateral     Cataract     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) [facility])     Chronic fatigue syndrome     Chronic low back pain     Concussion w loss of consciousness of unsp duration, init     Coronary artery disease     [facility]    Diabetes mellitus ([facility] )     type 2, insulin dependent    DJD (degenerative joint disease)     GERD (gastroesophageal reflux disease)     Herpes zoster     HLD (hyperlipidemia)     HTN (hypertension)     Hyperlipidemia     Hypothyroidism     Irritable bowel syndrome     Lumbar radiculopathy     Lyme disease     Dx in hospital 8/2011    Lyme disease     [facility] infarction) [facility] )     Migraines     Muscle spasm     Non-neoplastic nevus     Nontoxic multinodular goiter     OA (osteoarthritis)     Osteoarthritis     Other chronic pain     PAD (peripheral artery disease) (HCC)     Palpitations     Pseudogout     Spinal stenosis     Transient cerebral ischemia     Trigger ring finger     Viral gastroenteritis        Past Surgical History:   Procedure Laterality Date    APPENDECTOMY      ARTERIOGRAM  12/19/2017    CARDIAC CATHETERIZATION      CHOLECYSTECTOMY      COLONOSCOPY      CORONARY ARTERY BYPASS GRAFT  2004    LUMBAR LAMINECTOMY      MT ECHO TRANSESOPHAG R-T 2D W/PRB IMG ACQUISJ I&R N/A 4/3/2018    Procedure: TRANSESOPHAGEAL ECHOCARDIOGRAM (ROBB); Surgeon: Dino Aase, DO;  Location: [facility];  Service: Cardiac Surgery    MT REPLACE AORTIC VALVE OPENFEMORAL ARTERY APPROACH N/A 4/3/2018    Procedure: REPLACEMENT AORTIC VALVE TRANSCATHETER (TAVR) TRANSFEMORAL w/ 26MM IVY YEVGENIY S3 VALVE;  Surgeon: Dino Aase, DO;  Location: [facility];  Service: Cardiac Surgery    THYROIDECTOMY      TOTAL ABDOMINAL HYSTERECTOMY W/ BILATERAL SALPINGOOPHORECTOMY         Family History   Problem Relation Age of Onset    Cancer Mother     Stroke Mother     Cancer Father     Heart disease Father     Breast cancer Sister     Diabetes Daughter      Passed away January 2017      I have reviewed and agree with the history as documented  Social History   Substance Use Topics    Smoking status: Never Smoker    Smokeless tobacco: Never Used    Alcohol use No        Review of Systems   Constitutional: Negative for diaphoresis, fever and unexpected weight change     HENT: Negative for congestion, rhinorrhea and sore throat  Eyes: Negative for pain, discharge and visual disturbance  Respiratory: Negative for cough, shortness of breath and wheezing  Cardiovascular: Negative for chest pain, palpitations and leg swelling  Gastrointestinal: Positive for abdominal pain, constipation, nausea and vomiting  Negative for blood in stool and diarrhea  Genitourinary: Negative for dysuria, flank pain and hematuria  Musculoskeletal: Negative for arthralgias and joint swelling  Skin: Negative for rash and wound  Allergic/Immunologic: Negative for environmental allergies and food allergies  Neurological: Negative for dizziness, seizures, weakness and numbness  Hematological: Negative for adenopathy  Psychiatric/Behavioral: Negative for confusion and hallucinations  Physical Exam  ED Triage Vitals   Temperature Pulse Respirations Blood Pressure SpO2   05/18/18 0851 05/18/18 0849 05/18/18 0849 05/18/18 0849 05/18/18 0849   97 8 °F (36 6 °C) 71 16 132/84 94 %      Temp src Heart Rate Source Patient Position - Orthostatic VS BP Location FiO2 (%)   -- 05/18/18 0849 05/18/18 0849 05/18/18 0849 --    Monitor Sitting Right arm       Pain Score       05/18/18 0849       4           Orthostatic Vital Signs  Vitals:    05/18/18 0849 05/18/18 1019 05/18/18 1224   BP: 132/84 168/73 153/66   Pulse: 71 67 67   Patient Position - Orthostatic VS: Sitting  Lying       Physical Exam   Constitutional: She is oriented to person, place, and time  She appears well-developed and well-nourished  No distress  HENT:   Head: Normocephalic and atraumatic  Right Ear: External ear normal    Left Ear: External ear normal    Eyes: Conjunctivae and EOM are normal  Pupils are equal, round, and reactive to light  Neck: Normal range of motion  Neck supple  Cardiovascular: Normal rate, regular rhythm and normal heart sounds  No murmur heard  Pulmonary/Chest: Effort normal and breath sounds normal  No respiratory distress   She has no wheezes  She has no rales  Lungs clear bilaterally  Abdominal: Soft  Bowel sounds are normal  She exhibits no distension  There is tenderness  There is no guarding  Mild tenderness to palpation to the right of the umbilicus  No guarding or peritoneal signs  Abdomen does not appear distended  Musculoskeletal: Normal range of motion  She exhibits no deformity  Neurological: She is alert and oriented to person, place, and time  No gross motor deficits noted  Cranial nerves II-XII are intact  Speech is fluent without dysarthria or aphasia  Skin: Skin is warm and dry  Capillary refill takes less than 2 seconds  Psychiatric: She has a normal mood and affect  Her behavior is normal  Thought content normal    Nursing note and vitals reviewed  ED Medications  Medications   sodium chloride 0 9 % bolus 500 mL (0 mL Intravenous Stopped 5/18/18 1202)   metoclopramide (REGLAN) injection 10 mg (10 mg Intravenous Given 5/18/18 1026)   sodium chloride 0 9 % bolus 500 mL (0 mL Intravenous Stopped 5/18/18 1319)       Diagnostic Studies  Results Reviewed     Procedure Component Value Units Date/Time    Basic metabolic panel [[id number]]  (Abnormal) Collected:  05/18/18 1318    Lab Status:  Final result Specimen:  Blood from Arm, Left Updated:  05/18/18 1356     Sodium 138 mmol/L      Potassium 4 4 mmol/L      Chloride 106 mmol/L      CO2 25 mmol/L      Anion Gap 7 mmol/L      BUN 38 (H) mg/dL      Creatinine 1 65 (H) mg/dL      Glucose 109 mg/dL      Calcium 6 6 (L) mg/dL      eGFR 29 ml/min/1 73sq m     Narrative:         National Kidney Disease Education Program recommendations are as follows:  GFR calculation is accurate only with a steady state creatinine  Chronic Kidney disease less than 60 ml/min/1 73 sq  meters  Kidney failure less than 15 ml/min/1 73 sq  meters  [employer] draw [[id number]] Collected:  05/18/18 0917    Lab Status:  Final result Specimen:  Blood Updated:  05/18/18 1103    Narrative:        The following orders were created for panel order [place] draw  Procedure                               Abnormality         Status                     ---------                               -----------         ------                     Loye Minor / Black tube on QMRB[[id number]]                        Final result                 Please view results for these tests on the individual orders  Urine Microscopic [[id number]]  (Abnormal) Collected:  05/18/18 1025    Lab Status:  Final result Specimen:  Urine from Urine, Other Updated:  05/18/18 1042     RBC, UA None Seen /hpf      WBC, UA 2-4 (A) /hpf      Epithelial Cells None Seen /hpf      Bacteria, UA None Seen /hpf      Hyaline Casts, UA 3-5 (A) /lpf     Troponin I [[id number]]  (Normal) Collected:  05/18/18 0917    Lab Status:  Final result Specimen:  Blood from Arm, Left Updated:  05/18/18 1031     Troponin I 0 02 ng/mL     Narrative:         [employer] Chemistry analyzer 99% cutoff is > 0 04 ng/mL in network labs    o cTnI 99% cutoff is useful only when applied to patients in the clinical setting of myocardial ischemia  o cTnI 99% cutoff should be interpreted in the context of clinical history, ECG findings and possibly cardiac imaging to establish correct diagnosis  o cTnI 99% cutoff may be suggestive but clearly not indicative of a coronary event without the clinical setting of myocardial ischemia      POCT urinalysis dipstick [[id number]]  (Abnormal) Resulted:  05/18/18 1022    Lab Status:  Final result Specimen:  Urine from Urine, Other Updated:  05/18/18 1022    ED Urine Macroscopic [[id number]]  (Abnormal) Collected:  05/18/18 1025    Lab Status:  Final result Specimen:  Urine Updated:  05/18/18 1020     Color, UA Yellow     Clarity, UA Clear     pH, UA 5 0     Leukocytes, UA Small (A)     Nitrite, UA Negative     Protein, UA Negative mg/dl      Glucose, UA Negative mg/dl      Ketones, UA Negative mg/dl      Urobilinogen, UA 0 2 E U /dl      Bilirubin, UA Negative Blood, UA Negative     Specific Gravity, UA 1 010    Narrative:       CLINITEK RESULT    Comprehensive metabolic panel [[id number]]  (Abnormal) Collected:  05/18/18 0917    Lab Status:  Final result Specimen:  Blood from Arm, Left Updated:  05/18/18 0950     Sodium 135 (L) mmol/L      Potassium 4 9 mmol/L      Chloride 102 mmol/L      CO2 24 mmol/L      Anion Gap 9 mmol/L      BUN 40 (H) mg/dL      Creatinine 1 89 (H) mg/dL      Glucose 187 (H) mg/dL      Calcium 6 8 (L) mg/dL      AST 46 (H) U/L      ALT 20 U/L      Alkaline Phosphatase 59 U/L      Total Protein 7 3 g/dL      Albumin 3 2 (L) g/dL      Total Bilirubin 0 49 mg/dL      eGFR 25 ml/min/1 73sq m     Narrative:         National Kidney Disease Education Program recommendations are as follows:  GFR calculation is accurate only with a steady state creatinine  Chronic Kidney disease less than 60 ml/min/1 73 sq  meters  Kidney failure less than 15 ml/min/1 73 sq  meters  Lipase [[id number]]  (Normal) Collected:  05/18/18 0917    Lab Status:  Final result Specimen:  Blood from Arm, Left Updated:  05/18/18 0950     Lipase 339 u/L     CBC and differential [[id number]]  (Abnormal) Collected:  05/18/18 0917    Lab Status:  Final result Specimen:  Blood from Arm, Left Updated:  05/18/18 0937     WBC 9 31 Thousand/uL      RBC 4 79 Million/uL      Hemoglobin 10 1 (L) g/dL      Hematocrit 33 9 (L) %      MCV 71 (L) fL      MCH 21 1 (L) pg      MCHC 29 8 (L) g/dL      RDW 21 0 (H) %      Platelets 031 Thousands/uL      nRBC 0 /100 WBCs      Neutrophils Relative 73 %      Lymphocytes Relative 18 %      Monocytes Relative 6 %      Eosinophils Relative 3 %      Basophils Relative 0 %      Neutrophils Absolute 6 80 Thousands/µL      Lymphocytes Absolute 1 63 Thousands/µL      Monocytes Absolute 0 54 Thousand/µL      Eosinophils Absolute 0 30 Thousand/µL      Basophils Absolute 0 03 Thousands/µL     Narrative: This is an appended report    These results have been appended to a previously verified report  CT abdomen pelvis wo contrast   Final Result by Daiana Mcleod DO (05/18 1114)      Stable examination  No acute abdominal or pelvic pathology  Nonobstructing calculi noted within the kidneys  Workstation performed: [id number]               Procedures  ECG 12 Lead Documentation  Date/Time: 5/18/2018 11:21 AM  Performed by: Fabiola Richter by: Nicholas Yo     Patient location:  ED  Previous ECG:     Previous ECG:  Compared to current    Comparison ECG info: May 1, 2018    Similarity:  Changes noted    Comparison to cardiac monitor: Yes    Interpretation:     Interpretation: non-specific    Rate:     ECG rate:  68    ECG rate assessment: normal    Rhythm:     Rhythm: sinus rhythm    Ectopy:     Ectopy: none    QRS:     QRS axis:  Left    QRS intervals: Wide  Conduction:     Conduction: abnormal      Abnormal conduction: complete LBBB    ST segments:     ST segments:  Normal  T waves:     T waves: normal    Comments:      EKG demonstrates normal sinus rhythm with left bundle branch block which is chronic  No acute ischemic changes  There are no concordant ST segment elevations, ST segment depressions in V1-V3, or excessively discordant ST segment elevation with ST elevation/S wave ratio >0 25  Therefore, none of the modified S[place] criteria are met  These criteria are specific for MI  Phone Consults  ED Phone Contact    ED Course  ED Course as of May 18 2104   Fri May 18, 2018   0900 Blood Pressure: 132/84   0900 Temperature: 97 8 °F (36 6 °C)   0900 Pulse: 71   0900 Respirations: 16   0900 SpO2: 94 %   0945 CBC demonstrates microcytic anemia at baseline  0954 Lipase: 339   0954 CMP demonstrates LEONARD, creatinine is 1 89 up from 0 96 11 days ago  1027 Microscopic analysis pending  Leukocytes, UA: (!) Small   1040 Troponin I: 0 02   1046 Urine microscopic not consistent with UTI, no urinary symptoms   Will not treat      5729 Patient updated on lab results and CT results  Will give total 1 L IV fluids and recheck BMP  If creatinine improves, will discharge  If not, will admit for LEONARD and give further IV hydration  1257 Patient reassessed  Patient appears comfortable and in no distress  No further episodes of vomiting  She is currently receiving the 2nd 500 mL bolus  Repeat BMP will be drawn after this is completed  1328 BMP drawn  1356 Improved with IV fluids  Creatinine: (!) 1 65           Identification of Seniors at Risk      Most Recent Value   (ISAR) Identification of Seniors at Risk   Before the illness or injury that brought you to the Emergency, did you need someone to help you on a regular basis? 1 Filed at: 05/18/2018 0850   In the last 24 hours, have you needed more help than usual?  1 Filed at: 05/18/2018 0672   Have you been hospitalized for one or more nights during the past 6 months? 1 Filed at: 05/18/2018 0850   In general, do you see well?  0 Filed at: 05/18/2018 0850   In general, do you have serious problems with your memory? 0 Filed at: 05/18/2018 2137   Do you take more than three different medications every day? 1 Filed at: 05/18/2018 0850   ISAR Score  4 Filed at: 05/18/2018 0850                          MDM  Number of Diagnoses or Management Options  LEONARD (acute kidney injury) ([facility] ): new and requires workup  Prerenal azotemia: new and requires workup  Viral gastroenteritis: new and requires workup  Diagnosis management comments:     [de-identified]year old female with numerous comorbid conditions  Vital signs and physical examination were benign  We were concerned for gastroenteritis, SBO, ileus, neoplasm, or other acute intraabdominal pathology  Labs obtained as above  CMP demonstrated LEONARD, likely pre-renal pattern due to poor PO intake  Other labs and CT abdomen/pelvis were within normal limits   We treated patient with 1 L of IV fluids and rechecked creatinine which did improve with IV fluids  Most likely diagnosis is viral gastroenteritis  Will advise hydration  Strict return precautions provided  Amount and/or Complexity of Data Reviewed  Clinical lab tests: ordered and reviewed  Tests in the radiology section of CPT®: ordered and reviewed  Decide to obtain previous medical records or to obtain history from someone other than the patient: yes  Review and summarize past medical records: yes  Independent visualization of images, tracings, or specimens: yes    Risk of Complications, Morbidity, and/or Mortality  Presenting problems: moderate  Diagnostic procedures: minimal  Management options: minimal    Patient Progress  Patient progress: improved    CritCare Time    Disposition  Final diagnoses:   Viral gastroenteritis   LEONARD (acute kidney injury) ([facility] )   Prerenal azotemia     Time reflects when diagnosis was documented in both MDM as applicable and the Disposition within this note     Time User Action Codes Description Comment    5/18/2018  2:40 PM Verlee Sachin Add [A08 4] Viral gastroenteritis     5/18/2018  2:40 PM Verlee Sachin Add [N17 9] LEONARD (acute kidney injury) ([facility] )     5/18/2018  2:40 PM Verlee Sachin Add [R79 89] Prerenal azotemia       ED Disposition     ED Disposition Condition Comment    Discharge  Kayleerenuka Arelyvivian Oscar discharge to home/self care  Condition at discharge: Good        Follow-up Information     Follow up With Specialties Details Why [address]occo Villavicencio MD Internal Medicine Call As needed   [address]  [facility] [place]             Discharge Medication List as of 5/18/2018  7:35 PM      START taking these medications    Details   metoclopramide (REGLAN) 10 mg tablet Take 0 5 tablets (5 mg total) by mouth every 6 (six) hours as needed (Nausea and vomiting), Starting Fri 5/18/2018, Print         CONTINUE these medications which have NOT CHANGED    Details   acetaminophen (TYLENOL) 650 mg CR tablet Take 1 tablet (650 mg total) by mouth every 8 (eight) hours as needed for mild pain Do not take in conjunction with Percocet , Starting Fri 4/6/2018, Normal      albuterol (VENTOLIN HFA) 90 mcg/act inhaler Inhale 108 mcg 2 (two) times a day as needed 2 puffs bid PRN, Historical Med      amiodarone 200 mg tablet Take 1 tablet (200 mg total) by mouth daily with breakfast, Starting Sat 5/19/2018, Normal      aspirin 81 MG tablet Take 81 mg by mouth daily, Historical Med      atorvastatin (LIPITOR) 80 mg tablet Take 1 tablet (80 mg total) by mouth daily, Starting Thu 4/26/2018, Normal      calcium carbonate (TUMS) 500 mg chewable tablet Chew 2 tablets 3 (three) times a day  , Historical Med      cholecalciferol (VITAMIN D3) 1,000 units tablet Take 1,000 Units by mouth daily  , Starting Thu 10/20/2016, Historical Med      docusate sodium (COLACE) 100 mg capsule Take 1 capsule (100 mg total) by mouth 2 (two) times a day, Starting Fri 4/6/2018, Normal      fluticasone (FLONASE) 50 mcg/act nasal spray 1 spray into each nostril daily, Historical Med      furosemide (LASIX) 40 mg tablet Take 40 mg by mouth daily  , Historical Med      glucose blood test strip 1 each by Other route 4 (four) times a day (before meals and at bedtime) Use as instructed, Historical Med      LANTUS 100 UNIT/ML subcutaneous injection Inject 20 Units under the skin 2 (two) times a day, Starting Thu 4/19/2018, No Print      levETIRAcetam (KEPPRA) 750 mg tablet Take 1 tablet (750 mg total) by mouth every 12 (twelve) hours, Starting Mon 5/7/2018, Normal      levothyroxine 100 mcg tablet Take 100 mcg by mouth daily, Historical Med      lisinopril (ZESTRIL) 5 mg tablet Take 2 5 mg by mouth daily at bedtime  , Historical Med      loratadine (CLARITIN) 10 mg tablet Take 1 tablet (10 mg total) by mouth daily, Starting Wed 3/28/2018, Normal      metFORMIN (GLUCOPHAGE) 1000 MG tablet 1,000 mg 2 (two) times a day, Historical Med      metoprolol tartrate (LOPRESSOR) 100 mg tablet Take 1 tablet (100 mg total) by mouth every 12 (twelve) hours, Starting Fri 4/6/2018, No Print      Mometasone Furoate (ASMANEX HFA) 100 MCG/ACT AERO Inhale 2 puffs once daily, Normal      nitroglycerin (NITROSTAT) 0 4 mg SL tablet Place 0 4 mg under the tongue every 3 (three) minutes as needed, Starting Mon 5/11/2015, Historical Med      pantoprazole (PROTONIX) 40 mg tablet Take 40 mg by mouth daily, Historical Med      polyethylene glycol (MIRALAX) 17 g packet Take 17 g by mouth daily, Historical Med      potassium chloride (K-DUR,KLOR-CON) 10 mEq tablet Take 10 mEq by mouth 2 (two) times a day, Historical Med      rivaroxaban (XARELTO) 20 mg tablet Take 1 tablet (20 mg total) by mouth daily, Starting Thu 4/19/2018, No Print           No discharge procedures on file  ED Provider  Attending physically available and evaluated Emerald Bryant  ROBERT managed the patient along with the ED Attending      Electronically Signed by         Gus Godinez MD  05/18/18 7808

## 2018-05-18 NOTE — ED NOTES
With patient's permission, called [facility] for medication list and to let them know she is in our ED  Spoke with Ryan who instructed to take patient's IV from [facility] out       Janet Olmstead RN  05/18/18 [id number]

## 2018-05-18 NOTE — ED ATTENDING ATTESTATION
Edward Cain DO, saw and evaluated the patient  I have discussed the patient with the resident/non-physician practitioner and agree with the resident's/non-physician practitioner's findings, Plan of Care, and MDM as documented in the resident's/non-physician practitioner's note, except where noted  All available labs and Radiology studies were reviewed  At this point I agree with the current assessment done in the Emergency Department  I have conducted an independent evaluation of this patient a history and physical is as follows:    [de-identified] yo female presents for 6d hx of "feeling unwell" and 5d hx of NBNB emesis 2-3x/day, hasn't vomited today  Has not had BM in 3 days  Has numerous severe comorbidities, including recent TAVR with prolonged ICU stay  Pt has 3 pillow orthopnea at baseline  Symptoms generalized in nature, constant but wax and wane  No a/e factors  Moderate severity  Also had an episode of generalized abd pain today which lasted for 2 minutes  Currently doesn't have abd pain  Hx cholecystectomy, appendectomy, hysterectomy  Imp: vomiting, constipation  plan: ECG, trop, CXR, CMP, CBC, lipase, antiemetics, IVF, CT abd/pelvis without contrast as pt has listed contrast allergy        Critical Care Time  CritCare Time    Procedures

## 2018-05-18 NOTE — DISCHARGE INSTRUCTIONS
Gastroenteritis, Ambulatory Care   GENERAL INFORMATION:   Gastroenteritis , or stomach flu, is an infection of the stomach and intestines  It is caused by bacteria, parasites, or viruses  Common symptoms include the following:   · Diarrhea or gas    · Nausea, vomiting, or poor appetite    · Abdominal cramps, pain, or gurgling    · Fever    · Tiredness or weakness    · Headaches or muscle aches with any of the above symptoms  Seek immediate care for the following symptoms:   · Blood in your diarrhea    · Unable to stop vomiting    · No urinating for 12 hours    · Legs or arms that are blue    · Trouble breathing or a very fast pulse    · Lightheadedness or dizziness  Treatment for gastroenteritis  may include medicines to stop your diarrhea and vomiting  You may also need medicines to treat an infection caused by bacteria or parasites  Manage your symptoms:   · Drink liquids as directed  Ask how much liquid to drink each day and which liquids are best for you  You may need to drink more liquids than usual to prevent dehydration  Suck on ice or take small sips of liquids often if you have trouble keeping liquids down  · Drink oral rehydration solution (ORS) as directed  An ORS contains water, salts, and sugar that are needed to replace lost body fluids  Ask what kind of ORS to use and how much to drink  · Eat bland foods  When you feel hungry, begin to eat soft, bland foods  Examples are bananas, clear soup, potatoes, and applesauce  Do not eat dairy products, alcohol, sugary drinks, or caffeine until you feel better  Prevent the spread of germs:   · Wash your hands often  Use soap and water  Wash your hands after you use the bathroom, change a child's diapers, or sneeze  Wash your hands before you prepare or eat food  · Clean surfaces and do laundry often  Wash your clothes and towels separately from the rest of the laundry   Clean surfaces in your home with antibacterial  or bleach  · Clean food thoroughly and cook safely  Wash raw vegetables before you cook  Cook meat, fish, and eggs fully  Do not use the same dishes for raw meat as you do for other foods  Refrigerate any leftover food immediately  · Be aware when you camp or travel  Drink only clean water  Do not drink from rivers or lakes unless you purify or boil the water first  When you travel, drink bottled water and avoid ice  Do not eat fruit that has not been peeled  Avoid raw fish or meat that is not fully cooked  Follow up with your healthcare provider as directed:  Write down your questions so you remember to ask them during your visits  CARE AGREEMENT:   You have the right to help plan your care  Learn about your health condition and how it may be treated  Discuss treatment options with your caregivers to decide what care you want to receive  You always have the right to refuse treatment  The above information is an  only  It is not intended as medical advice for individual conditions or treatments  Talk to your doctor, nurse or pharmacist before following any medical regimen to see if it is safe and effective for you  © 2014 [address] is for End User's use only and may not be sold, redistributed or otherwise used for commercial purposes  All illustrations and images included in CareNotes® are the copyrighted property of [employer]  or [employer]  Acute Kidney Injury, Ambulatory Care   GENERAL INFORMATION:   Acute kidney injury  happens when your kidneys suddenly stop working correctly  Normally, the kidneys turn fluid, chemicals, and waste from your blood into urine  In acute kidney injury, your kidneys can no longer do this  In most cases, it is temporary, but it may become a chronic kidney condition    Common symptoms include the following:   · Decreased urination or dark-colored urine    · Swelling in your arms, legs, or feet · Abdominal or low back pain    · Vomiting, diarrhea, or loss of appetite    · Fatigue     · Skin rash  Seek immediate care for the following symptoms:   · Heart beating faster than normal for you    · Sudden chest pain or trouble breathing    · Seizure  Treatment for acute kidney injury:  Treatment depends upon the cause of your acute kidney injury and how severe it is  Medicines may be given to increase blood flow to your kidneys and protect your kidneys  You may also need medicine to decrease inflammation in your kidneys  You may be given IV fluids to replenish fluids and help your heart pump blood  Dialysis may be needed to remove chemicals and waste from your blood when your kidneys cannot  Manage acute kidney injury:   · Manage other health conditions  Care for your diabetes, high blood pressure, or heart disease  These conditions increase your risk for acute kidney injury  · Talk to your healthcare provider before you take over-the-counter-medicine  NSAIDs, stomach medicine, or laxatives may harm your kidneys and increase your risk for acute kidney injury  Follow up with your healthcare provider as directed:  Write down your questions so you remember to ask them during your visits  CARE AGREEMENT:   You have the right to help plan your care  Learn about your health condition and how it may be treated  Discuss treatment options with your caregivers to decide what care you want to receive  You always have the right to refuse treatment  The above information is an  only  It is not intended as medical advice for individual conditions or treatments  Talk to your doctor, nurse or pharmacist before following any medical regimen to see if it is safe and effective for you  © 2014 [address] is for End User's use only and may not be sold, redistributed or otherwise used for commercial purposes   All illustrations and images included in CareNotes® are the copyrighted property of Nadine HAWLEY  or [employer]

## 2018-05-23 ENCOUNTER — APPOINTMENT (EMERGENCY)
Dept: RADIOLOGY | Facility: HOSPITAL | Age: 78
DRG: 683 | End: 2018-05-23
Payer: MEDICARE

## 2018-05-23 ENCOUNTER — HOSPITAL ENCOUNTER (INPATIENT)
Facility: HOSPITAL | Age: 78
LOS: 2 days | Discharge: NURSING, REHAB OR LONG-TERM CARE | DRG: 683 | End: 2018-05-26
Attending: EMERGENCY MEDICINE | Admitting: INTERNAL MEDICINE
Payer: MEDICARE

## 2018-05-23 DIAGNOSIS — E83.42 HYPOMAGNESEMIA: ICD-10-CM

## 2018-05-23 DIAGNOSIS — I48.92 ATRIAL FLUTTER, UNSPECIFIED TYPE (HCC): ICD-10-CM

## 2018-05-23 DIAGNOSIS — R56.9 SEIZURE (HCC): ICD-10-CM

## 2018-05-23 DIAGNOSIS — R11.10 VOMITING: Primary | ICD-10-CM

## 2018-05-23 LAB
ALBUMIN SERPL BCP-MCNC: 3.4 G/DL (ref 3.5–5)
ALP SERPL-CCNC: 55 U/L (ref 46–116)
ALT SERPL W P-5'-P-CCNC: 19 U/L (ref 12–78)
ANION GAP SERPL CALCULATED.3IONS-SCNC: 10 MMOL/L (ref 4–13)
AST SERPL W P-5'-P-CCNC: 25 U/L (ref 5–45)
BASOPHILS # BLD AUTO: 0.04 THOUSANDS/ΜL (ref 0–0.1)
BASOPHILS NFR BLD AUTO: 0 % (ref 0–1)
BILIRUB SERPL-MCNC: 0.64 MG/DL (ref 0.2–1)
BUN SERPL-MCNC: 25 MG/DL (ref 5–25)
CALCIUM SERPL-MCNC: 6.7 MG/DL (ref 8.3–10.1)
CHLORIDE SERPL-SCNC: 102 MMOL/L (ref 100–108)
CO2 SERPL-SCNC: 24 MMOL/L (ref 21–32)
CREAT SERPL-MCNC: 1.42 MG/DL (ref 0.6–1.3)
EOSINOPHIL # BLD AUTO: 0.23 THOUSAND/ΜL (ref 0–0.61)
EOSINOPHIL NFR BLD AUTO: 2 % (ref 0–6)
ERYTHROCYTE [DISTWIDTH] IN BLOOD BY AUTOMATED COUNT: 21.4 % (ref 11.6–15.1)
GFR SERPL CREATININE-BSD FRML MDRD: 35 ML/MIN/1.73SQ M
GLUCOSE SERPL-MCNC: 114 MG/DL (ref 65–140)
HCT VFR BLD AUTO: 33.6 % (ref 34.8–46.1)
HGB BLD-MCNC: 10.3 G/DL (ref 11.5–15.4)
LACTATE SERPL-SCNC: 2.1 MMOL/L (ref 0.5–2)
LIPASE SERPL-CCNC: 321 U/L (ref 73–393)
LYMPHOCYTES # BLD AUTO: 2.34 THOUSANDS/ΜL (ref 0.6–4.47)
LYMPHOCYTES NFR BLD AUTO: 21 % (ref 14–44)
MCH RBC QN AUTO: 21.5 PG (ref 26.8–34.3)
MCHC RBC AUTO-ENTMCNC: 30.7 G/DL (ref 31.4–37.4)
MCV RBC AUTO: 70 FL (ref 82–98)
MONOCYTES # BLD AUTO: 0.87 THOUSAND/ΜL (ref 0.17–1.22)
MONOCYTES NFR BLD AUTO: 8 % (ref 4–12)
NEUTROPHILS # BLD AUTO: 7.4 THOUSANDS/ΜL (ref 1.85–7.62)
NEUTS SEG NFR BLD AUTO: 68 % (ref 43–75)
NRBC BLD AUTO-RTO: 0 /100 WBCS
PLATELET # BLD AUTO: 251 THOUSANDS/UL (ref 149–390)
PMV BLD AUTO: 10.4 FL (ref 8.9–12.7)
POTASSIUM SERPL-SCNC: 3.8 MMOL/L (ref 3.5–5.3)
PROT SERPL-MCNC: 7.9 G/DL (ref 6.4–8.2)
RBC # BLD AUTO: 4.78 MILLION/UL (ref 3.81–5.12)
SODIUM SERPL-SCNC: 136 MMOL/L (ref 136–145)
WBC # BLD AUTO: 10.92 THOUSAND/UL (ref 4.31–10.16)

## 2018-05-23 PROCEDURE — 36415 COLL VENOUS BLD VENIPUNCTURE: CPT

## 2018-05-23 PROCEDURE — 74176 CT ABD & PELVIS W/O CONTRAST: CPT

## 2018-05-23 PROCEDURE — 84484 ASSAY OF TROPONIN QUANT: CPT | Performed by: EMERGENCY MEDICINE

## 2018-05-23 PROCEDURE — 85025 COMPLETE CBC W/AUTO DIFF WBC: CPT | Performed by: EMERGENCY MEDICINE

## 2018-05-23 PROCEDURE — 80053 COMPREHEN METABOLIC PANEL: CPT | Performed by: EMERGENCY MEDICINE

## 2018-05-23 PROCEDURE — 83605 ASSAY OF LACTIC ACID: CPT | Performed by: EMERGENCY MEDICINE

## 2018-05-23 PROCEDURE — 83690 ASSAY OF LIPASE: CPT | Performed by: EMERGENCY MEDICINE

## 2018-05-23 PROCEDURE — 96374 THER/PROPH/DIAG INJ IV PUSH: CPT

## 2018-05-23 RX ORDER — METOCLOPRAMIDE HYDROCHLORIDE 5 MG/ML
5 INJECTION INTRAMUSCULAR; INTRAVENOUS ONCE
Status: DISCONTINUED | OUTPATIENT
Start: 2018-05-23 | End: 2018-05-23

## 2018-05-23 RX ORDER — FERROUS SULFATE 325(65) MG
325 TABLET ORAL
COMMUNITY
End: 2018-06-15 | Stop reason: SDUPTHER

## 2018-05-23 RX ORDER — PROMETHAZINE HYDROCHLORIDE 25 MG/ML
12.5 INJECTION, SOLUTION INTRAMUSCULAR; INTRAVENOUS ONCE
Status: COMPLETED | OUTPATIENT
Start: 2018-05-23 | End: 2018-05-23

## 2018-05-23 RX ADMIN — PROMETHAZINE HYDROCHLORIDE 12.5 MG: 25 INJECTION INTRAMUSCULAR; INTRAVENOUS at 23:27

## 2018-05-23 RX ADMIN — SODIUM CHLORIDE 500 ML: 0.9 INJECTION, SOLUTION INTRAVENOUS at 23:26

## 2018-05-23 NOTE — TELEPHONE ENCOUNTER
I called [facility]  Amaris Miller is still in [address] and they have no anticipated discharge date  The  will call and schedule a follow up appt upon her release

## 2018-05-24 PROBLEM — R19.7 DIARRHEA: Status: ACTIVE | Noted: 2018-05-24

## 2018-05-24 PROBLEM — R11.10 VOMITING AND DIARRHEA: Status: ACTIVE | Noted: 2018-05-24

## 2018-05-24 LAB
ALBUMIN SERPL BCP-MCNC: 3 G/DL (ref 3.5–5)
ALP SERPL-CCNC: 46 U/L (ref 46–116)
ALT SERPL W P-5'-P-CCNC: 15 U/L (ref 12–78)
ANION GAP SERPL CALCULATED.3IONS-SCNC: 10 MMOL/L (ref 4–13)
AST SERPL W P-5'-P-CCNC: 21 U/L (ref 5–45)
BILIRUB SERPL-MCNC: 0.59 MG/DL (ref 0.2–1)
BUN SERPL-MCNC: 23 MG/DL (ref 5–25)
CALCIUM SERPL-MCNC: 6.3 MG/DL (ref 8.3–10.1)
CHLORIDE SERPL-SCNC: 105 MMOL/L (ref 100–108)
CO2 SERPL-SCNC: 25 MMOL/L (ref 21–32)
CREAT SERPL-MCNC: 1.26 MG/DL (ref 0.6–1.3)
GFR SERPL CREATININE-BSD FRML MDRD: 40 ML/MIN/1.73SQ M
GLUCOSE SERPL-MCNC: 100 MG/DL (ref 65–140)
GLUCOSE SERPL-MCNC: 202 MG/DL (ref 65–140)
GLUCOSE SERPL-MCNC: 205 MG/DL (ref 65–140)
GLUCOSE SERPL-MCNC: 84 MG/DL (ref 65–140)
GLUCOSE SERPL-MCNC: 98 MG/DL (ref 65–140)
LACTATE SERPL-SCNC: 1.6 MMOL/L (ref 0.5–2)
MAGNESIUM SERPL-MCNC: 0.5 MG/DL (ref 1.6–2.6)
PHOSPHATE SERPL-MCNC: 4.9 MG/DL (ref 2.3–4.1)
POTASSIUM SERPL-SCNC: 3.6 MMOL/L (ref 3.5–5.3)
PROT SERPL-MCNC: 6.6 G/DL (ref 6.4–8.2)
SODIUM SERPL-SCNC: 140 MMOL/L (ref 136–145)
TROPONIN I SERPL-MCNC: <0.02 NG/ML

## 2018-05-24 PROCEDURE — 82948 REAGENT STRIP/BLOOD GLUCOSE: CPT

## 2018-05-24 PROCEDURE — 36415 COLL VENOUS BLD VENIPUNCTURE: CPT | Performed by: STUDENT IN AN ORGANIZED HEALTH CARE EDUCATION/TRAINING PROGRAM

## 2018-05-24 PROCEDURE — 84100 ASSAY OF PHOSPHORUS: CPT | Performed by: INTERNAL MEDICINE

## 2018-05-24 PROCEDURE — 99285 EMERGENCY DEPT VISIT HI MDM: CPT

## 2018-05-24 PROCEDURE — 83735 ASSAY OF MAGNESIUM: CPT | Performed by: INTERNAL MEDICINE

## 2018-05-24 PROCEDURE — 99220 PR INITIAL OBSERVATION CARE/DAY 70 MINUTES: CPT | Performed by: INTERNAL MEDICINE

## 2018-05-24 PROCEDURE — 80053 COMPREHEN METABOLIC PANEL: CPT | Performed by: INTERNAL MEDICINE

## 2018-05-24 PROCEDURE — 83605 ASSAY OF LACTIC ACID: CPT | Performed by: STUDENT IN AN ORGANIZED HEALTH CARE EDUCATION/TRAINING PROGRAM

## 2018-05-24 RX ORDER — ATORVASTATIN CALCIUM 80 MG/1
80 TABLET, FILM COATED ORAL
Status: DISCONTINUED | OUTPATIENT
Start: 2018-05-24 | End: 2018-05-26 | Stop reason: HOSPADM

## 2018-05-24 RX ORDER — MELATONIN
1000 DAILY
Status: DISCONTINUED | OUTPATIENT
Start: 2018-05-24 | End: 2018-05-26 | Stop reason: HOSPADM

## 2018-05-24 RX ORDER — METOPROLOL TARTRATE 50 MG/1
100 TABLET, FILM COATED ORAL EVERY 12 HOURS SCHEDULED
Status: DISCONTINUED | OUTPATIENT
Start: 2018-05-24 | End: 2018-05-26 | Stop reason: HOSPADM

## 2018-05-24 RX ORDER — INSULIN GLARGINE 100 [IU]/ML
20 INJECTION, SOLUTION SUBCUTANEOUS 2 TIMES DAILY
Status: DISCONTINUED | OUTPATIENT
Start: 2018-05-24 | End: 2018-05-26 | Stop reason: HOSPADM

## 2018-05-24 RX ORDER — ASPIRIN 81 MG/1
81 TABLET, CHEWABLE ORAL DAILY
Status: DISCONTINUED | OUTPATIENT
Start: 2018-05-24 | End: 2018-05-26 | Stop reason: HOSPADM

## 2018-05-24 RX ORDER — LEVOTHYROXINE SODIUM 0.1 MG/1
100 TABLET ORAL
Status: DISCONTINUED | OUTPATIENT
Start: 2018-05-24 | End: 2018-05-26 | Stop reason: HOSPADM

## 2018-05-24 RX ORDER — ALBUTEROL SULFATE 90 UG/1
2 AEROSOL, METERED RESPIRATORY (INHALATION) 2 TIMES DAILY PRN
Status: DISCONTINUED | OUTPATIENT
Start: 2018-05-24 | End: 2018-05-26 | Stop reason: HOSPADM

## 2018-05-24 RX ORDER — SENNOSIDES 8.6 MG
650 CAPSULE ORAL EVERY 8 HOURS PRN
Status: DISCONTINUED | OUTPATIENT
Start: 2018-05-24 | End: 2018-05-24 | Stop reason: RX

## 2018-05-24 RX ORDER — LISINOPRIL 2.5 MG/1
2.5 TABLET ORAL
Status: DISCONTINUED | OUTPATIENT
Start: 2018-05-24 | End: 2018-05-26 | Stop reason: HOSPADM

## 2018-05-24 RX ORDER — LEVETIRACETAM 750 MG/1
750 TABLET ORAL EVERY 12 HOURS SCHEDULED
Status: DISCONTINUED | OUTPATIENT
Start: 2018-05-24 | End: 2018-05-26 | Stop reason: HOSPADM

## 2018-05-24 RX ORDER — AMIODARONE HYDROCHLORIDE 200 MG/1
200 TABLET ORAL 2 TIMES DAILY
Status: DISCONTINUED | OUTPATIENT
Start: 2018-05-24 | End: 2018-05-26 | Stop reason: HOSPADM

## 2018-05-24 RX ORDER — SODIUM CHLORIDE 9 MG/ML
50 INJECTION, SOLUTION INTRAVENOUS CONTINUOUS
Status: DISCONTINUED | OUTPATIENT
Start: 2018-05-24 | End: 2018-05-26

## 2018-05-24 RX ORDER — HEPARIN SODIUM 5000 [USP'U]/ML
5000 INJECTION, SOLUTION INTRAVENOUS; SUBCUTANEOUS EVERY 8 HOURS SCHEDULED
Status: DISCONTINUED | OUTPATIENT
Start: 2018-05-24 | End: 2018-05-24

## 2018-05-24 RX ORDER — DOCUSATE SODIUM 100 MG/1
100 CAPSULE, LIQUID FILLED ORAL 2 TIMES DAILY
Status: DISCONTINUED | OUTPATIENT
Start: 2018-05-24 | End: 2018-05-26 | Stop reason: HOSPADM

## 2018-05-24 RX ORDER — FUROSEMIDE 40 MG/1
40 TABLET ORAL 2 TIMES DAILY
Status: DISCONTINUED | OUTPATIENT
Start: 2018-05-24 | End: 2018-05-24

## 2018-05-24 RX ORDER — METOCLOPRAMIDE 10 MG/1
5 TABLET ORAL EVERY 6 HOURS PRN
Status: DISCONTINUED | OUTPATIENT
Start: 2018-05-24 | End: 2018-05-24

## 2018-05-24 RX ORDER — FERROUS SULFATE 325(65) MG
325 TABLET ORAL
Status: DISCONTINUED | OUTPATIENT
Start: 2018-05-24 | End: 2018-05-26 | Stop reason: HOSPADM

## 2018-05-24 RX ORDER — POLYETHYLENE GLYCOL 3350 17 G/17G
17 POWDER, FOR SOLUTION ORAL DAILY
Status: DISCONTINUED | OUTPATIENT
Start: 2018-05-24 | End: 2018-05-26 | Stop reason: HOSPADM

## 2018-05-24 RX ORDER — FLUTICASONE PROPIONATE 110 UG/1
2 AEROSOL, METERED RESPIRATORY (INHALATION) DAILY
Status: DISCONTINUED | OUTPATIENT
Start: 2018-05-24 | End: 2018-05-26 | Stop reason: HOSPADM

## 2018-05-24 RX ORDER — PANTOPRAZOLE SODIUM 40 MG/1
40 TABLET, DELAYED RELEASE ORAL
Status: DISCONTINUED | OUTPATIENT
Start: 2018-05-24 | End: 2018-05-26 | Stop reason: HOSPADM

## 2018-05-24 RX ORDER — POTASSIUM CHLORIDE 750 MG/1
10 TABLET, EXTENDED RELEASE ORAL 2 TIMES DAILY
Status: DISCONTINUED | OUTPATIENT
Start: 2018-05-24 | End: 2018-05-26 | Stop reason: HOSPADM

## 2018-05-24 RX ORDER — CALCIUM CARBONATE 200(500)MG
1000 TABLET,CHEWABLE ORAL 3 TIMES DAILY
Status: DISCONTINUED | OUTPATIENT
Start: 2018-05-24 | End: 2018-05-26 | Stop reason: HOSPADM

## 2018-05-24 RX ORDER — ACETAMINOPHEN 325 MG/1
650 TABLET ORAL EVERY 6 HOURS PRN
Status: DISCONTINUED | OUTPATIENT
Start: 2018-05-24 | End: 2018-05-26 | Stop reason: HOSPADM

## 2018-05-24 RX ORDER — LORATADINE 10 MG/1
10 TABLET ORAL DAILY
Status: DISCONTINUED | OUTPATIENT
Start: 2018-05-24 | End: 2018-05-26 | Stop reason: HOSPADM

## 2018-05-24 RX ORDER — MAGNESIUM SULFATE HEPTAHYDRATE 40 MG/ML
4 INJECTION, SOLUTION INTRAVENOUS ONCE
Status: COMPLETED | OUTPATIENT
Start: 2018-05-24 | End: 2018-05-24

## 2018-05-24 RX ORDER — FLUTICASONE PROPIONATE 50 MCG
1 SPRAY, SUSPENSION (ML) NASAL DAILY
Status: DISCONTINUED | OUTPATIENT
Start: 2018-05-24 | End: 2018-05-26 | Stop reason: HOSPADM

## 2018-05-24 RX ADMIN — LEVETIRACETAM 750 MG: 750 TABLET ORAL at 22:10

## 2018-05-24 RX ADMIN — Medication 1000 MG: at 09:20

## 2018-05-24 RX ADMIN — RIVAROXABAN 15 MG: 15 TABLET, FILM COATED ORAL at 09:19

## 2018-05-24 RX ADMIN — INSULIN GLARGINE 20 UNITS: 100 INJECTION, SOLUTION SUBCUTANEOUS at 09:25

## 2018-05-24 RX ADMIN — MAGNESIUM SULFATE HEPTAHYDRATE 4 G: 40 INJECTION, SOLUTION INTRAVENOUS at 10:29

## 2018-05-24 RX ADMIN — INSULIN LISPRO 1 UNITS: 100 INJECTION, SOLUTION INTRAVENOUS; SUBCUTANEOUS at 22:13

## 2018-05-24 RX ADMIN — LEVETIRACETAM 750 MG: 750 TABLET ORAL at 09:19

## 2018-05-24 RX ADMIN — LISINOPRIL 2.5 MG: 2.5 TABLET ORAL at 22:10

## 2018-05-24 RX ADMIN — METOPROLOL TARTRATE 100 MG: 50 TABLET ORAL at 22:10

## 2018-05-24 RX ADMIN — POTASSIUM CHLORIDE 10 MEQ: 750 TABLET, EXTENDED RELEASE ORAL at 17:06

## 2018-05-24 RX ADMIN — INSULIN GLARGINE 20 UNITS: 100 INJECTION, SOLUTION SUBCUTANEOUS at 17:05

## 2018-05-24 RX ADMIN — FLUTICASONE PROPIONATE 2 PUFF: 110 AEROSOL, METERED RESPIRATORY (INHALATION) at 10:35

## 2018-05-24 RX ADMIN — ASPIRIN 81 MG 81 MG: 81 TABLET ORAL at 09:17

## 2018-05-24 RX ADMIN — CALCIUM GLUCONATE 2 G: 98 INJECTION, SOLUTION INTRAVENOUS at 10:40

## 2018-05-24 RX ADMIN — METOPROLOL TARTRATE 100 MG: 50 TABLET ORAL at 09:17

## 2018-05-24 RX ADMIN — INSULIN LISPRO 1 UNITS: 100 INJECTION, SOLUTION INTRAVENOUS; SUBCUTANEOUS at 17:00

## 2018-05-24 RX ADMIN — FLUTICASONE PROPIONATE 1 SPRAY: 50 SPRAY, METERED NASAL at 10:35

## 2018-05-24 RX ADMIN — Medication 325 MG: at 09:17

## 2018-05-24 RX ADMIN — AMIODARONE HYDROCHLORIDE 200 MG: 200 TABLET ORAL at 09:17

## 2018-05-24 RX ADMIN — SODIUM CHLORIDE 50 ML/HR: 0.9 INJECTION, SOLUTION INTRAVENOUS at 04:17

## 2018-05-24 RX ADMIN — AMIODARONE HYDROCHLORIDE 200 MG: 200 TABLET ORAL at 17:06

## 2018-05-24 RX ADMIN — LEVOTHYROXINE SODIUM 100 MCG: 100 TABLET ORAL at 06:23

## 2018-05-24 RX ADMIN — ATORVASTATIN CALCIUM 80 MG: 80 TABLET, FILM COATED ORAL at 17:15

## 2018-05-24 RX ADMIN — PANTOPRAZOLE SODIUM 40 MG: 40 TABLET, DELAYED RELEASE ORAL at 06:23

## 2018-05-24 RX ADMIN — POTASSIUM CHLORIDE 10 MEQ: 750 TABLET, EXTENDED RELEASE ORAL at 09:19

## 2018-05-24 RX ADMIN — VITAMIN D, TAB 1000IU (100/BT) 1000 UNITS: 25 TAB at 09:20

## 2018-05-24 RX ADMIN — LORATADINE 10 MG: 10 TABLET ORAL at 09:20

## 2018-05-24 NOTE — PROGRESS NOTES
INTERNAL MEDICINE HISTORY AND PHYSICAL  ED 03 SOD Team C     NAME: Adam Woodson  AGE: [de-identified] y o  SEX: female  : 1940   MRN: [id number]  ENCOUNTER: [id number]    DATE: 2018  TIME: 12:14 AM    Primary Care Physician: Florin Rashid MD  Admitting Provider: James Logan MD    Chief complaint: Nausea/vomiting  History of Present Illness     Adam Woodson is a [de-identified] y o  female with extensive past medical history notable for insulin-dependant diabetes, CAD s/p CABG (), aortic stenosis s/p TAVR (4/3/18),  paroxsymal afib w/ recent cardioembolic CVA () now on Xarelto, chronic systolic/diastolic CHF presenting for abdominal pain, na/vom  The patient was recently admitted  through  with several days of worsening fatigue and intermittent vomiting  Soon after admission she experienced a generalized convulsive seizure and was transferred to the ICU  She was noted to have no further seizure activity on EMU, seizures suspected secondary to prior CVA in context of ongoing electrolyte abnormalities  During her prior admission she was additionally diuresed for HF, started on amiodarone for frequent episodes of NSVT  She was discharged to [facility]  She had presented to the ED last week with several days of abdominal pain with nausea and vomiting, poor PO intake  CT at the time with small bilateral intrarenal calculi, otherwise unremkarble At that time she was found to be hemodynamically stable with LEONARD (creat 1 89 from baseline ~ 9), given IV fluids with improvement in creatinine and discharged  The patient presents this admission with recurrent abdominal pain  She states that she was feeling improved after prior ED visit but yesterday developed loose stools, generalized abdominal pain and several episodes of non-bilious vomiting yesterday  Has had some decreased PO intake but was able to tolerate soup   She currently states she feels much better since receiving fluids/anti-emetics in ED  In the ED she was found to be afebrile with SBP in the 170s satting well on RA  CT now with diffuse thickening of the gastric wall c/w gastritis, liquid stool in the colon, stable nephrolithiasis  Labs with LA of 2 1, creat of 1 42, hypocalcemia (corrected calcium 7 2)  Review of Systems   Review of Systems   Constitutional: Negative for activity change, appetite change, fatigue, fever and unexpected weight change  HENT: Negative for sore throat  Eyes: Negative for photophobia and visual disturbance  Respiratory: Negative for apnea, chest tightness and shortness of breath  Cardiovascular: Negative for chest pain and palpitations  Gastrointestinal: Positive for abdominal pain, diarrhea, nausea and vomiting  Endocrine: Negative for polyuria  Genitourinary: Negative for dysuria  Musculoskeletal: Negative for arthralgias and back pain  Skin: Negative for pallor and rash  Neurological: Negative for dizziness, tremors, weakness, numbness and headaches         Past Medical History     Past Medical History:   Diagnosis Date    Altered mental status     Anticoagulated     Coumadin for Aflutter    Asthma     Atrial flutter (HCC)     maintained on coumadin anticoag    CAD (coronary artery disease)     Candidiasis     Carotid stenosis, bilateral     Cataract     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) [facility])     Chronic fatigue syndrome     Chronic low back pain     Concussion w loss of consciousness of unsp duration, init     Coronary artery disease     CVA [facility]cerebral vascular accident) ([facility] [address] ) 4/17/2018    Diabetes mellitus ([facility] [address] )     type 2, insulin dependent    DJD (degenerative joint disease)     GERD (gastroesophageal reflux disease)     Herpes zoster     HLD (hyperlipidemia)     HTN (hypertension)     Hyperlipidemia     Hypothyroidism     Irritable bowel syndrome     Lumbar radiculopathy     Lyme disease     Dx in [facility] 8/2011    Lyme disease     MI (myocardial infarction) [facility] [address] [facility]     Migraines     Muscle spasm     Non-neoplastic nevus     Nontoxic multinodular goiter     OA (osteoarthritis)     Osteoarthritis     Other chronic pain     PAD (peripheral artery disease) (HCC)     Palpitations     Pseudogout     Spinal stenosis     Transient cerebral ischemia     Trigger ring finger     Viral gastroenteritis        Past Surgical History     Past Surgical History:   Procedure Laterality Date    APPENDECTOMY      ARTERIOGRAM  12/19/2017    CARDIAC CATHETERIZATION      CHOLECYSTECTOMY      COLONOSCOPY      CORONARY ARTERY BYPASS GRAFT  2004    LUMBAR LAMINECTOMY      AR ECHO TRANSESOPHAG R-T 2D W/PRB IMG ACQUISJ I&R N/A 4/3/2018    Procedure: TRANSESOPHAGEAL ECHOCARDIOGRAM (ROBB); Surgeon: Nyasia White DO;  Location: [facility];  Service: Cardiac Surgery    AR REPLACE AORTIC VALVE OPENFEMORAL ARTERY APPROACH N/A 4/3/2018    Procedure: REPLACEMENT AORTIC VALVE TRANSCATHETER (TAVR) TRANSFEMORAL w/ 26MM IVY YEVGENIY S3 VALVE;  Surgeon: Nyasia White DO;  Location: [facility];  Service: Cardiac Surgery    THYROIDECTOMY      TOTAL ABDOMINAL HYSTERECTOMY W/ BILATERAL SALPINGOOPHORECTOMY         Social History     History   Alcohol Use No     History   Drug Use No     History   Smoking Status    Never Smoker   Smokeless Tobacco    Never Used       Family History     Family History   Problem Relation Age of Onset    Cancer Mother     Stroke Mother     Cancer Father     Heart disease Father     Breast cancer Sister     Diabetes Daughter      Passed away January 2017        Medications Prior to Admission     Prior to Admission medications    Medication Sig Start Date End Date Taking? Authorizing Provider   acetaminophen (TYLENOL) 650 mg CR tablet Take 1 tablet (650 mg total) by mouth every 8 (eight) hours as needed for mild pain Do not take in conjunction with Percocet   4/6/18  Yes Amando MAN MAGDALENO Hernandez   albuterol (VENTOLIN HFA) 90 mcg/act inhaler Inhale 108 mcg 2 (two) times a day as needed 2 puffs bid PRN   Yes Historical Provider, MD   amiodarone 200 mg tablet Take 1 tablet (200 mg total) by mouth daily with breakfast  Patient taking differently: Take 200 mg by mouth 2 (two) times a day   5/19/18  Yes Florence Boone DO   aspirin 81 MG tablet Take 81 mg by mouth daily   Yes Historical Provider, MD   atorvastatin (LIPITOR) 80 mg tablet Take 1 tablet (80 mg total) by mouth daily 4/26/18  Yes Hany Boles MD   calcium carbonate (TUMS) 500 mg chewable tablet Chew 2 tablets 3 (three) times a day     Yes Historical Provider, MD   cholecalciferol (VITAMIN D3) 1,000 units tablet Take 1,000 Units by mouth daily   10/20/16  Yes Historical Provider, MD   docusate sodium (COLACE) 100 mg capsule Take 1 capsule (100 mg total) by mouth 2 (two) times a day 4/6/18  Yes Amando Hernandez PA-C   ferrous sulfate 325 (65 Fe) mg tablet Take 325 mg by mouth daily with breakfast   Yes Historical Provider, MD   fluticasone (FLONASE) 50 mcg/act nasal spray 1 spray into each nostril daily   Yes Historical Provider, MD   furosemide (LASIX) 40 mg tablet Take 40 mg by mouth 2 (two) times a day     Yes Historical Provider, MD   LANTUS 100 UNIT/ML subcutaneous injection Inject 20 Units under the skin 2 (two) times a day 4/19/18  Yes Bebeto Melara   levETIRAcetam (KEPPRA) 750 mg tablet Take 1 tablet (750 mg total) by mouth every 12 (twelve) hours 5/7/18  Yes Florence Boone DO   levothyroxine 100 mcg tablet Take 100 mcg by mouth daily   Yes Historical Provider, MD   lisinopril (ZESTRIL) 5 mg tablet Take 2 5 mg by mouth daily at bedtime     Yes Historical Provider, MD   loratadine (CLARITIN) 10 mg tablet Take 1 tablet (10 mg total) by mouth daily 3/28/18  Yes Elizabeth Way PA-C   metFORMIN (GLUCOPHAGE) 1000 MG tablet 1,000 mg 2 (two) times a day   Yes Historical Provider, MD   metoclopramide (REGLAN) 10 mg tablet Take 0 5 tablets (5 mg total) by mouth every 6 (six) hours as needed (Nausea and vomiting) 5/18/18  Yes Alcides Michelle MD   metoprolol tartrate (LOPRESSOR) 100 mg tablet Take 1 tablet (100 mg total) by mouth every 12 (twelve) hours 4/6/18  Yes Amando Hernandez PA-C   Mometasone Furoate [facility] HFA) 100 MCG/ACT AERO Inhale 2 puffs once daily 3/12/18  Yes Yi Alexander MD   nitroglycerin (NITROSTAT) 0 4 mg SL tablet Place 0 4 mg under the tongue every 3 (three) minutes as needed 5/11/15  Yes Historical Provider, MD   pantoprazole (PROTONIX) 40 mg tablet Take 40 mg by mouth daily   Yes Historical Provider, MD   polyethylene glycol (MIRALAX) 17 g packet Take 17 g by mouth daily   Yes Historical Provider, MD   potassium chloride (K-DUR,KLOR-CON) 10 mEq tablet Take 10 mEq by mouth 2 (two) times a day   Yes Historical Provider, MD   rivaroxaban (XARELTO) 20 mg tablet Take 1 tablet (20 mg total) by mouth daily 4/19/18  Yes Zeferino Dailey   glucose blood test strip 1 each by Other route 4 (four) times a day (before meals and at bedtime) Use as instructed    Historical Provider, MD       Allergies     Allergies   Allergen Reactions    Other Chest Pain     IVP-listed as "chest pain" in previous chart, patient stated this occurred "a long time ago" but does not remember exactly occurred     Cephalosporins Chest Pain    Contrast [Iodinated Diagnostic Agents] Other (See Comments)     Flash pulm edema    Doxycycline Chest Pain    Levaquin [Levofloxacin] Chest Pain    Ondansetron Chest Pain     Prolonged QT    Toradol [Ketorolac Tromethamine] Chest Pain       Objective     Vitals:    05/23/18 2210   BP: (!) 173/73   BP Location: Left arm   Pulse: 70   Resp: 18   Temp: 97 8 °F (36 6 °C)   TempSrc: Tympanic   SpO2: 97%   Weight: 69 9 kg (154 lb)     Body mass index is 26 43 kg/m²    No intake or output data in the 24 hours ending 05/24/18 0014  Invasive Devices     Peripheral Intravenous Line            Peripheral IV 05/23/18 Right Forearm less than 1 day                Physical Exam  Physical Exam   Constitutional: No distress  HENT:   Head: Normocephalic and atraumatic  Eyes: No scleral icterus  Neck: Normal range of motion  Cardiovascular: Normal heart sounds and intact distal pulses  No murmur heard  Pulmonary/Chest: Effort normal and breath sounds normal    Musculoskeletal: Normal range of motion  She exhibits no edema  Neurological:   No gross neuro deficits   Skin: Skin is warm and dry  She is not diaphoretic  AAOx4     Lab Results: I have personally reviewed pertinent reports  Imaging: I have personally reviewed pertinent films in PACS  Ct Abdomen Pelvis Wo Contrast    Result Date: 5/23/2018  Narrative: CT ABDOMEN AND PELVIS WITHOUT IV CONTRAST INDICATION:   vomiting/ cant tolerate po  COMPARISON: CT of the abdomen and pelvis on May 18, 2018  TECHNIQUE:  CT examination of the abdomen and pelvis was performed without intravenous contrast   Axial, sagittal, and coronal 2D reformatted images were created from the source data and submitted for interpretation  Radiation dose length product (DLP) for this visit:  567 28 mGy-cm   This examination, like all CT scans performed in the [facility], was performed utilizing techniques to minimize radiation dose exposure, including the use of iterative  reconstruction and automated exposure control  Enteric contrast was not administered  FINDINGS: ABDOMEN LOWER CHEST:  Mitral annular calcification  LIVER/BILIARY TREE:  Unremarkable  GALLBLADDER:  Gallbladder is surgically absent  SPLEEN:  Unremarkable  PANCREAS:  Atrophic ADRENAL GLANDS:  Unremarkable  KIDNEYS/URETERS:  One or more simple appearing renal cyst(s) is noted  Otherwise unremarkable kidneys  Redemonstrated bilateral nonobstructing nephrolithiasis  No hydronephrosis  STOMACH AND BOWEL:  The gastric wall appears diffusely thickened which may be seen in setting of gastritis    There is no bowel obstruction  A few hyperdensities are seen within the bowel which likely reflect ingested material or pills  There is liquid stool in the colon which may be seen in setting of diarrhea  APPENDIX:  Status post appendectomy  ABDOMINOPELVIC CAVITY:  No ascites or free intraperitoneal air  No lymphadenopathy  VESSELS:  Moderate atherosclerotic calcifications  PELVIS REPRODUCTIVE ORGANS:  Patient is status post hysterectomy  URINARY BLADDER:  Unremarkable  ABDOMINAL WALL/INGUINAL REGIONS:  Unremarkable  OSSEOUS STRUCTURES:  Degenerative changes of the osseous structures  No aggressive osseous lesion  Impression: 1  Diffuse thickening of the gastric wall which may be seen in setting of gastritis  2   Liquid stool in the colon which may be seen in the setting of diarrheal illness  3   Stable bilateral nonobstructing nephrolithiasis  Workstation performed: [id number]     Ct Abdomen Pelvis Wo Contrast    Result Date: 5/18/2018  Narrative: CT ABDOMEN AND PELVIS WITHOUT IV CONTRAST INDICATION:   Generalized abdominal pain, 5 days of nausea and vomiting, history of multiple abdominal surgeries  5 days of generalized abdominal pain, nausea and vomiting  Multiple prior surgeries  COMPARISON: None  TECHNIQUE:  CT examination of the abdomen and pelvis was performed without intravenous contrast   Axial, sagittal, and coronal 2D reformatted images were created from the source data and submitted for interpretation  Radiation dose length product (DLP) for this visit:  740 39 mGy-cm   This examination, like all CT scans performed in the [facility], was performed utilizing techniques to minimize radiation dose exposure, including the use of iterative  reconstruction and automated exposure control  Enteric contrast was not given  IV contrast was also not given secondary to patient's history of severe allergic reaction and renal insufficiency   FINDINGS: ABDOMEN LOWER CHEST:  Mild emphysema within the lung bases   There is calcification of the mitral annulus  LIVER/BILIARY TREE:  Unremarkable  GALLBLADDER:  Gallbladder is surgically absent  SPLEEN:  Unremarkable  PANCREAS:  Unremarkable  ADRENAL GLANDS:  Unremarkable  KIDNEYS/URETERS:  No hydronephrosis  There are nonobstructing intrarenal calculi noted within the kidneys bilaterally, right more than left  These measure 5 mm or less in size  No ureteral calculi  STOMACH AND BOWEL:  Unremarkable  APPENDIX:  No findings to suggest appendicitis  ABDOMINOPELVIC CAVITY:  No ascites or free intraperitoneal air  No lymphadenopathy  VESSELS:  Moderate atherosclerotic disease of the abdominal aorta and abdominal vasculature  PELVIS REPRODUCTIVE ORGANS:  Unremarkable for patient's age  URINARY BLADDER:  Unremarkable  ABDOMINAL WALL/INGUINAL REGIONS:  Unremarkable  OSSEOUS STRUCTURES:  Stable dextroscoliosis of the thoracolumbar spine  Thoracic and lumbar degenerative disc disease and endplate degenerative changes noted similar to the prior examination with mild scattered canal stenosis and foraminal narrowing  Impression: Stable examination  No acute abdominal or pelvic pathology  Nonobstructing calculi noted within the kidneys  Workstation performed: [id number]     Ct Abdomen Pelvis Wo Contrast    Result Date: 4/28/2018  Narrative: CT ABDOMEN AND PELVIS WITHOUT IV CONTRAST INDICATION:   abdominal pain, nausea  Nausea and weakness for 2 days  COMPARISON: CT chest abdomen pelvis performed March 15, 2018 TECHNIQUE:  CT examination of the abdomen and pelvis was performed without intravenous contrast   Axial, sagittal, and coronal 2D reformatted images were created from the source data and submitted for interpretation  Radiation dose length product (DLP) for this visit:  710 22 mGy-cm     This examination, like all CT scans performed in the [facility], was performed utilizing techniques to minimize radiation dose exposure, including the use of iterative reconstruction and automated exposure control  Enteric contrast was not administered  FINDINGS: ABDOMEN LOWER CHEST:  Emphysematous changes are present in the lower lobes of the lungs bilaterally  The heart is enlarged  Coronary artery calcifications  Prior aortic valve replacement  LIVER/BILIARY TREE:  Unremarkable  GALLBLADDER:  Surgically absent  SPLEEN:  Unremarkable  PANCREAS:  Unremarkable  ADRENAL GLANDS:  Unremarkable  KIDNEYS/URETERS:  One or more sharply circumscribed subcentimeter renal hypodensities are noted  These lesions are too small to accurately characterize, but are statistically most likely to represent benign cortical renal cyst(s)  According to the guidelines published in the [facility] Paper of the ACR Incidental Findings Committee (Radiology 2010), no further workup of these lesions is recommended  Renal calculi bilaterally, stable from the prior study  No hydronephrosis or hydroureter  No ureteric calculi  Extensive vascular calcifications in the renal vessels  STOMACH AND BOWEL:  Evaluation limited due to lack of oral contrast material  Stomach decompressed  Hiatal hernia  Small bowel unremarkable  Moderate amount of liquid feces throughout the colon  Correlate for diarrhea  APPENDIX:  Surgically absent  ABDOMINOPELVIC CAVITY:  No ascites or free intraperitoneal air  No lymphadenopathy  VESSELS:  Extensive vascular calcifications  PELVIS REPRODUCTIVE ORGANS:  Surgically absent  URINARY BLADDER:  Unremarkable  ABDOMINAL WALL/INGUINAL REGIONS:  Unremarkable  OSSEOUS STRUCTURES:  No acute fracture or destructive osseous lesion  Degenerative changes  Impression: No acute abdominal pelvic pathology  No significant change from prior  Workstation performed: [id number]     Xr Chest Portable    Result Date: 5/1/2018  Narrative: CHEST INDICATION:     COMPARISON:  AP chest 4/29/2018  EXAM PERFORMED/VIEWS:  XR CHEST PORTABLE FINDINGS: Stable cardiac and mediastinal contours    Median sternotomy wires  Aortic valve replacement  Extensive bilateral hazy airspace disease throughout both lungs with a small left basilar effusion may indicate CHF  No pneumothorax  Osseous structures appear within normal limits for patient age  Impression: Extensive bilateral hazy airspace disease throughout both lungs with a small left basilar effusion may indicate CHF  Multilobar pneumonia is less likely part of the differential diagnosis  Workstation performed: [id number]     Xr Chest Portable    Result Date: 4/30/2018  Narrative: CHEST INDICATION:   line placement  COMPARISON:  April 29, 2018 EXAM PERFORMED/VIEWS:  XR CHEST PORTABLE FINDINGS:  Endotracheal tube in place with tip 2 5 cm above the dwayne  Nasogastric tube enters the stomach  Tip of right IJ catheter in expected location of SVC  Heart enlarged  Prior open heart surgery  Prosthetic aortic valve  Pulmonary vessels unremarkable  Atelectasis or consolidation in the left lower lobe  Small left pleural effusion  Lungs and pleural spaces otherwise clear  No pneumothorax  Osseous structures appear within normal limits for patient age  Impression: 1  Tip of right IJ catheter in superior vena cava  2   No evidence of pneumothorax  3   Small left pleural effusion and atelectasis or consolidation in the base of the left lower lobe  Workstation performed: [id number]     Xr Chest Portable    Result Date: 4/29/2018  Narrative: CHEST INDICATION:   SOB, rales  COMPARISON:  Chest radiographs April 28, 2018 EXAM PERFORMED/VIEWS:  XR CHEST PORTABLE FINDINGS: The heart is enlarged  Atherosclerotic changes in the aorta  Median sternotomy  Transcatheter aortic valve replacement  Lung volumes diminished  Paola and pulmonary vessels are prominent  Osseous structures appear within normal limits for patient age  Impression: Mild vascular congestion   Workstation performed: [id number]     Xr Chest 2 Views    Result Date: 4/28/2018  Narrative: CHEST INDICATION: Weakness, shaking and nausea for 2 days  COMPARISON:  4/16/2018, report CTA chest, abdomen and pelvis 3/15/2018 EXAM PERFORMED/VIEWS:  XR CHEST PA & LATERAL FINDINGS: Heart shadow is enlarged but unchanged from prior exam   Status post median sternotomy with aortic valve replacement  The lungs are clear  No pneumothorax or pleural effusion  Degenerative changes of the spine  Impression: No acute cardiopulmonary disease  Workstation performed: [id number]     Ct Head Wo Contrast    Result Date: 4/29/2018  Narrative: CT BRAIN - WITHOUT CONTRAST INDICATION:   Stroke  Seizure  Rapid response  COMPARISON:  CT brain April 28, 2018  TECHNIQUE:  CT examination of the brain was performed  In addition to axial images, coronal 2D reformatted images were created and submitted for interpretation  Radiation dose length product (DLP) for this visit:  980 mGy-cm   This examination, like all CT scans performed in the [facility], was performed utilizing techniques to minimize radiation dose exposure, including the use of iterative reconstruction and automated exposure control  IMAGE QUALITY:  Diagnostic  FINDINGS: PARENCHYMA: Decreased attenuation is noted in periventricular and subcortical white matter demonstrating an appearance that is statistically most likely to represent advanced microangiopathic change  Chronic lacunar infarction(s) are noted in basal ganglia  No CT signs of acute infarction  No intracranial mass, mass effect or midline shift  No acute parenchymal hemorrhage  Atherosclerotic vascular calcifications in carotid and vertebral arteries are more advanced than would be expected for the patient's  age  VENTRICLES AND EXTRA-AXIAL SPACES:  Normal for the patient's age  VISUALIZED ORBITS AND PARANASAL SINUSES:  Unremarkable  CALVARIUM AND EXTRACRANIAL SOFT TISSUES:  Normal      Impression: No acute intracranial abnormality  Microangiopathic changes   Workstation performed: [id number] Ct Head Without Contrast    Result Date: 4/28/2018  Narrative: CT BRAIN - WITHOUT CONTRAST INDICATION:   AMS ans vomit  COMPARISON:  April 16, 2018 TECHNIQUE:  CT examination of the brain was performed  In addition to axial images, coronal 2D reformatted images were created and submitted for interpretation  Radiation dose length product (DLP) for this visit:  994 mGy-cm   This examination, like all CT scans performed in the [facility], was performed utilizing techniques to minimize radiation dose exposure, including the use of iterative reconstruction and automated exposure control  IMAGE QUALITY:  Diagnostic  FINDINGS: PARENCHYMA: Decreased attenuation is noted in periventricular and subcortical white matter demonstrating an appearance that is statistically most likely to represent moderate microangiopathic change; this appearance is similar when compared to most recent prior examination  No CT signs of acute infarction  No intracranial mass, mass effect or midline shift  No acute parenchymal hemorrhage  VENTRICLES AND EXTRA-AXIAL SPACES:  Enlargement of ventricles and extra-axial CSF spaces, out of proportion to the patient's age most consistent with cerebral and cerebellar atrophy  VISUALIZED ORBITS AND PARANASAL SINUSES:  Partial opacification of the sphenoid sinuses is seen  CALVARIUM AND EXTRACRANIAL SOFT TISSUES:  Normal      Impression: No acute intracranial abnormality  Workstation performed: [id number]     Xr Chest Portable- Icu    Result Date: 4/29/2018  Narrative: CHEST INDICATION:   post intubation  COMPARISON:  Chest radiograph 4/29/2018 EXAM PERFORMED/VIEWS:  XR CHEST PORTABLE ICU FINDINGS:  Endotracheal tube is present, in satisfactory position with its tip above the level of the dwayne  Enteric tube is present with its tip extending below the left hemidiaphragm  The cardiomediastinal silhouette is enlarged    There are postsurgical changes to include a prosthetic heart valve and median sternotomy wires  There is a left basilar opacity and blunting of the left costophrenic angle  No evident pneumothorax  There is pulmonary vascular congestion  Osseous structures appear within normal limits for patient age  Impression: 1  Cardiomegaly and pulmonary vascular congestion  2   Left basilar opacity compatible with effusion and a component of atelectasis or infection  Workstation performed: [id number]     Mri Brain Seizure Wo And W Contrast    Result Date: 5/3/2018  Narrative: MRI  BRAIN  - WITH AND WITHOUT CONTRAST INDICATION: seizure/stroke Seizure disorder  COMPARISON: 4/17/2018, MRI brain without contrast  TECHNIQUE:  Sagittal 3D SPGR, axial T2, axial FLAIR, axial T1, axial [place], coronal T2 and FLAIR  Post-contrast axial T1 , Sagittal 3D SPGR with coronal recons  IV Contrast:  10 mL of gadobutrol injection (MULTI-DOSE) IMAGE QUALITY:   Diagnostic  FINDINGS: BRAIN PARENCHYMA:  Confluent hyperintense T2 and FLAIR signal throughout the cerebral hemispheres extending peripherally  White matter hyperintensity within the brainstem  Mild hyperintense signal noted adjacent to the cerebellar vermis within the posterior fossa  These findings are stable compared to the prior examination most suggestive of chronic microangiopathy  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  Small old lacunar infarcts  No mass, mass effect or midline shift  No hemorrhage  Punctate focus of diffusion and amounted within the right posterior frontal white matter, series 3 image 21 may represent a tiny new infarct  Mild diffuse cerebral volume loss  Mild volume loss of the hippocampal formations, bilaterally symmetric without increased signal  Mild focal right occipital cortical enhancement on series 11 image 13 corresponds to the small infarcts seen on prior exam  VENTRICLES:  The ventricles are enlarged bilaterally consistent with the degree of cerebral volume loss and diffuse white matter change   SELLA AND PITUITARY GLAND:  Normal  ORBITS:  Normal  PARANASAL SINUSES:  Normal  VASCULATURE:  Evaluation of the major intracranial vasculature demonstrates appropriate flow voids  CALVARIUM AND SKULL BASE:  Normal  EXTRACRANIAL SOFT TISSUES:  Normal      Impression: Stable cerebral volume loss and diffuse white matter changes most suggestive of extensive chronic microangiopathy  Enhancement of the previously seen small right occipital lobe infarct  There is a new diffusion abnormality measuring only 2 mm within the right centrum semiovale which may represent a small new focus of ischemia  Workstation performed: [id number]       Microbiology: cultures obtained in emergency department include     Urinalysis:   Results from last 7 days  Lab Units 05/18/18  1025   COLOR UA  Yellow   CLARITY UA  Clear   SPEC GRAV UA  1 010   PH UA  5 0   LEUKOCYTES UA  Small*   NITRITE UA  Negative   PROTEIN UA mg/dl Negative   GLUCOSE UA mg/dl Negative   KETONES UA mg/dl Negative   BILIRUBIN UA  Negative   BLOOD UA  Negative        Urine Micro:   Results from last 7 days  Lab Units 05/18/18  1025   RBC UA /hpf None Seen   WBC UA /hpf 2-4*   EPITHELIAL CELLS WET PREP /hpf None Seen   BACTERIA UA /hpf None Seen        EKG, Pathology, and Other Studies: I have personally reviewed pertinent reports  Medications given in Emergency Department     Medication Administration - last 24 hours from 05/23/2018 0014 to 05/24/2018 0014       Date/Time Order Dose Route Action Action by     05/23/2018 2327 promethazine (PHENERGAN) injection 12 5 mg 12 5 mg Intravenous Given Alfred Blair RN     05/23/2018 2326 sodium chloride 0 9 % bolus 500 mL 500 mL Intravenous New Bag Alfred Blair RN          Assessment and Plan     A/P      Abdominal pain: Unclear etiology, viral gastroenteritis / food poisoning / secondary to hypocalcemia  Has hx  nephrolithiasis but she does not feel like she is passing stone  CT AP c/w possible gastroenteritis   Slight lactate on admission likely secondary to volume depletion from loose stools/vomiting, now cleared with 500cc bolus  Not septic appearing, ab exam benign  Continue gentle hydration 50cc, trial on clear liquid diet/ADAT, check and replete lytes  Started on reglan, will hold for now as with prolonged qTC, anti-emetics per day team      Hypocalcemia: Chronic  Etiology unclear, prior admission with persistently low mag/potassium/tiera despite signficant repletion  No urgent indication repletion given chronicity, though many benefit from workup of persistently low lytes  Will check mag/phos and replete  LEONARD: Suspect component volume depletion, continue on fluids for now  1st degree AV block, QTc prolongation: TN increased to 320ms, QTc 540  Check and replete lytes  Caution QTc prologing agents, will hold reglan for now  Hx NSVT: Continue home amiordarone  Check/replete lytes     Chronic systolic/diastolic HF w/ hx AVR: Clinically euvolemic  No SOB/LE edema  S/p 500cc bolus, no worsening SOB/LE edema  Continue 50cc hydration for now, home lasix resumed per day team    CAD s/p CABG: Continue home asa/bb/statin/lisinopril     Hx cardioembolis stroke: Failed coumadin, transitioned to xarelto  Currently no gross focal neuro deficits  Afib/flutter: Continue home Xarelto/lopressor    Seizure event: Prior admission, suspected secondary to prior infarct/lytes  Currently very well-appearing, AAOx4, no gross focal neuro deficits  Continue home keppra  DM2: HbA1c 7 7% April 2018  Continue home lantus/ISS    COPD: No exacerbation, continue home flovet/albuterol     Hypothyrodism: Continue home synthroid  Code Status: Level 1 - Full Code  VTE Pharmacologic Prophylaxis: Reason for no pharmacologic prophylaxis Home Xarleto   VTE Mechanical Prophylaxis: sequential compression device  Admission Status: OBSERVATION    Admission Time  I spent 30 minutes admitting the patient    This involved direct patient contact where I performed a full history and physical, reviewing previous records, and reviewing laboratory and other diagnostic studies      Braeden Qureshi MD  Internal Medicine  PGY-1

## 2018-05-24 NOTE — PLAN OF CARE
Problem: DISCHARGE PLANNING - CARE MANAGEMENT  Goal: Discharge to post-acute care or home with appropriate resources  INTERVENTIONS:  - Conduct assessment to determine patient/family and health care team treatment goals, and need for post-acute services based on payer coverage, community resources, and patient preferences, and barriers to discharge  - Address psychosocial, clinical, and financial barriers to discharge as identified in assessment in conjunction with the patient/family and health care team  - Arrange appropriate level of post-acute services according to patient's   needs and preference and payer coverage in collaboration with the physician and health care team  - Communicate with and update the patient/family, physician, and health care team regarding progress on the discharge plan  - Arrange appropriate transportation to post-acute venues  - Pt will be discharged to inpt rehab  Outcome: Progressing

## 2018-05-24 NOTE — PHYSICIAN ADVISOR
Current patient class: Inpatient  The patient is currently on Hospital Day: 2      The patient was admitted to the hospital  on 5/24/18 at [phone] for the following diagnosis:  Vomiting [R11 10]       There is documentation in the medical record of an expected length of stay of at least 2 midnights  The patient is therefore expected to satisfy the 2 midnight benchmark and given the 2 midnight presumption is appropriate for INPATIENT ADMISSION  Given this expectation of a satisfying stay, CMS instructs us that the patient is most often appropriate for inpatient admission under part A provided medical necessity is documented in the chart  After review of the relevant documentation, labs, vital signs and test results, the patient is appropriate for INPATIENT ADMISSION  Admission to the hospital as an inpatient is a complex decision making process which requires the practitioner to consider the patients presenting complaint, history and physical examination and all relevant testing  With this in mind, in this case, the patient was deemed appropriate for INPATIENT ADMISSION  After review of the documentation and testing available at the time of the admission I concur with this clinical determination of medical necessity  The patient does have an inpatient admission within the previous 30 days  The patient was admitted on 5/18/18 and discharged on 5/18/18 as an inpatient  The patient therefore required readmission review  In this case the patient should be considered a SEPARATE and UNRELATED INPATIENT ADMISSION  The patient had been discharged in stable condition with a completed care plan  There were no unresolved acute medical issues at the time of discharged which would have reasonably been expected to prompt this readmission  Rationale is as follows: The patient is a [de-identified] yrs   Female who presented to the ED at 5/23/2018 10:05 PM with a chief complaint of Vomiting (pt vomiting x 1 week   was seen last friday and treated for dehydration d/t vomiting)  The patient presented with nausea, vomiting, and abdominal pain  The patient is being admitted with abdominal pain, hypocalcemia, LEONARD and first degree AV block  The plan of care includes IVF, telemetry monitoring, clear liquids, repeat labs, electrolyte replacement  This patient is appropriate for INPATIENT admission; continued hospitalization is necessary to ensure stabilization of her clinical status  This admission is UNRELATED to her prior admission for acute encephalopathy for which she was treated and discharged in stable condition          The patients vitals on arrival were ED Triage Vitals [05/23/18 2210]   Temperature Pulse Respirations Blood Pressure SpO2   97 8 °F (36 6 °C) 70 18 (!) 173/73 97 %      Temp Source Heart Rate Source Patient Position - Orthostatic VS BP Location FiO2 (%)   Tympanic Monitor Sitting Left arm --      Pain Score       No Pain           Past Medical History:   Diagnosis Date    Altered mental status     Anticoagulated     Coumadin for Aflutter    Asthma     Atrial flutter (HCC)     maintained on coumadin anticoag    CAD (coronary artery disease)     Candidiasis     Carotid stenosis, bilateral     Cataract     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) ([facility])     Chronic fatigue syndrome     Chronic low back pain     Concussion w loss of consciousness of unsp duration, init     Coronary artery disease     CVA (cerebral vascular accident) ([facility] [address] ) 4/17/2018    Diabetes mellitus ([facility] [address] )     type 2, insulin dependent    DJD (degenerative joint disease)     GERD (gastroesophageal reflux disease)     Herpes zoster     HLD (hyperlipidemia)     HTN (hypertension)     Hyperlipidemia     Hypothyroidism     Irritable bowel syndrome     Lumbar radiculopathy     Lyme disease     Dx in hospital 8/2011    Lyme disease     MI (myocardial infarction) ([facility])     Migraines     Muscle spasm     Non-neoplastic nevus     Nontoxic multinodular goiter     OA (osteoarthritis)     Osteoarthritis     Other chronic pain     PAD (peripheral artery disease) (HCC)     Palpitations     Pseudogout     Spinal stenosis     Transient cerebral ischemia     Trigger ring finger     Viral gastroenteritis      Past Surgical History:   Procedure Laterality Date    APPENDECTOMY      ARTERIOGRAM  12/19/2017    CARDIAC CATHETERIZATION      CHOLECYSTECTOMY      COLONOSCOPY      CORONARY ARTERY BYPASS GRAFT  2004    LUMBAR LAMINECTOMY      NM ECHO TRANSESOPHAG R-T 2D W/PRB IMG ACQUISJ I&R N/A 4/3/2018    Procedure: TRANSESOPHAGEAL ECHOCARDIOGRAM (ROBB);   Surgeon: Allen Stanley DO;  Location: [facility];  Service: Cardiac Surgery    NM REPLACE AORTIC VALVE OPENFEMORAL ARTERY APPROACH N/A 4/3/2018    Procedure: REPLACEMENT AORTIC VALVE TRANSCATHETER (TAVR) TRANSFEMORAL w/ 26MM IVY YEVGENIY S3 VALVE;  Surgeon: Allen Stanley DO;  Location: [facility];  Service: Cardiac Surgery    THYROIDECTOMY      TOTAL ABDOMINAL HYSTERECTOMY W/ BILATERAL SALPINGOOPHORECTOMY             Consults have been placed to:   IP CONSULT TO CASE MANAGEMENT  IP CONSULT TO CASE MANAGEMENT  IP CONSULT TO CASE MANAGEMENT    Vitals:    05/24/18 0120 05/24/18 0801 05/24/18 0917 05/24/18 1540   BP: 160/63 153/61 153/61 119/55   BP Location: Left arm Left arm  Left arm   Pulse: 74 81 81 58   Resp: 18 18  18   Temp: 98 °F (36 7 °C) 98 8 °F (37 1 °C)  98 4 °F (36 9 °C)   TempSrc: Oral Oral  Oral   SpO2: 100% 97%  99%   Weight:       Height: 5' 2" (1 575 m)          Most recent labs:    Recent Labs      05/23/18   2219  05/23/18   2329  05/24/18   0436   WBC  10 92*   --    --    HGB  10 3*   --    --    HCT  33 6*   --    --    PLT  251   --    --    K  3 8   --   3 6   NA  136   --   140   CALCIUM  6 7*   --   6 3*   BUN  25   --   23   CREATININE  1 42*   --   1 26   LIPASE  321   --    --    TROPONINI   --   <0 02   --    AST  25   -- 21   ALT  19   --   15   ALKPHOS  55   --   46   BILITOT  0 64   --   0 59       Scheduled Meds:  Current Facility-Administered Medications:  acetaminophen 650 mg Oral Q6H PRN Ceasar Wadsworth MD    albuterol 2 puff Inhalation BID PRN Ceasar Wadsworth MD    amiodarone 200 mg Oral BID Ceasar Wadsworth MD    aspirin 81 mg Oral Daily Ceasar Wadsworth MD    atorvastatin 80 mg Oral After Eder Thornton MD    calcium carbonate 1,000 mg Oral TID Ceasar Wadsworth MD    cholecalciferol 1,000 Units Oral Daily Ceasar Wadsworth MD    docusate sodium 100 mg Oral BID Ceasar Wadsworth MD    ferrous sulfate 325 mg Oral Daily With Breakfast Ceasar Wadsworth MD    fluticasone 1 spray Nasal Daily Ceasar Wadsworth MD    fluticasone 2 puff Inhalation Daily Ceasar Wadsworth MD    insulin glargine 20 Units Subcutaneous BID Ceasar Wadsworth MD    insulin lispro 1-5 Units Subcutaneous TID Rudy Pinto MD    insulin lispro 1-5 Units Subcutaneous HS Ceasar Wadsworth MD    levETIRAcetam 750 mg Oral Q12H [facility] Ceasar Wadsworth MD    levothyroxine 100 mcg Oral Early Morning Ceasar Wadsworth MD    lisinopril 2 5 mg Oral HS Ceasar Wadsworth MD    loratadine 10 mg Oral Daily Ceasar Wadsworth MD    metoprolol tartrate 100 mg Oral Q12H [facility] Ceasar Wadsworth MD    pantoprazole 40 mg Oral Early Morning Ceasar Wadsworth MD    polyethylene glycol 17 g Oral Daily Ceasar Wadsworth MD    potassium chloride 10 mEq Oral BID Ceasar Wadsworth MD    rivaroxaban 15 mg Oral Daily Ceasar Wadsworth MD    sodium chloride 50 mL/hr Intravenous Continuous Ceasar Wadsworth MD Last Rate: 50 mL/hr (05/24/18 0417)     Continuous Infusions:  sodium chloride 50 mL/hr Last Rate: 50 mL/hr (05/24/18 0417)     PRN Meds:   acetaminophen    albuterol    Surgical procedures (if appropriate):

## 2018-05-24 NOTE — PROGRESS NOTES
[facility] Senior Admission Note   Unit/Bed # @DBLINK (IUI,37930)@ Encounter: [id number]  SOD Team C           Edward Freeman [de-identified] y o  female [id number]       Patient seen and examined  Reviewed H&P per Dr Bustamante Fill  Agree with the assessment and plan  Patient with multiple medical conditions and extensive past medical history was admitted for vomiting, diarrhea and inability to have any oral intake for the past 3 days  CT abdomen pelvis without contrast -  Diffuse thickening of the gastric wall which may be seen in setting of gastritis  2   Liquid stool in the colon which may be seen in the setting of diarrheal illness  3   Stable bilateral nonobstructing nephrolithiasis      Assessment/Plan: Principal Problem:    Vomiting and diarrhea  Active Problems:    Essential hypertension    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 native heart without angina pectoris    Type 2 diabetes mellitus with complication, with long-term current use of insulin (HCC)    Atrial flutter (HCC)    Hyperlipidemia    Gastroesophageal reflux disease without esophagitis    Moderate mitral stenosis    Asthma    Hx of CABG    Chronic anticoagulation    Chronic combined systolic and diastolic congestive heart failure (HCC)    Postoperative hypothyroidism    Paroxysmal atrial fibrillation ([address] )    Seizure ([address] )     Plan - patient was started on mild IV fluid hydration normal saline 50 cc an hour, replete electrolytes as needed  - clear liquid diet    - continue home meds    - discharge patient on renally adjusted metformin 1000 mg daily and Xarelto 15 mg daily for AFib      Disposition:  OBSERVATION    Expected LOS: <2 Stephen Almaznar MD

## 2018-05-24 NOTE — PLAN OF CARE
DISCHARGE PLANNING     Discharge to home or other facility with appropriate resources Progressing        GASTROINTESTINAL - ADULT     Minimal or absence of nausea and/or vomiting Progressing        INFECTION - ADULT     Absence or prevention of progression during hospitalization Progressing        Knowledge Deficit     Patient/family/caregiver demonstrates understanding of disease process, treatment plan, medications, and discharge instructions Progressing        METABOLIC, FLUID AND ELECTROLYTES - ADULT     Electrolytes maintained within normal limits Progressing        Nutrition/Hydration-ADULT     Nutrient/Hydration intake appropriate for improving, restoring or maintaining nutritional needs Progressing        PAIN - ADULT     Verbalizes/displays adequate comfort level or baseline comfort level Progressing        Potential for Falls     Patient will remain free of falls Progressing        SAFETY ADULT     Patient will remain free of falls Progressing        SKIN/TISSUE INTEGRITY - ADULT     Skin integrity remains intact Progressing

## 2018-05-24 NOTE — ED ATTENDING ATTESTATION
Geovany Reyes MD, saw and evaluated the patient  I have discussed the patient with the resident/non-physician practitioner and agree with the resident's/non-physician practitioner's findings, Plan of Care, and MDM as documented in the resident's/non-physician practitioner's note, except where noted  All available labs and Radiology studies were reviewed  At this point I agree with the current assessment done in the Emergency Department  I have conducted an independent evaluation of this patient a history and physical is as follows: The patient presents with complaints of abdominal pain and some episodes of vomiting non bloody some loose stool patient was evaluated for similar episode about 1 week ago in the emergency room had a negative workup for was sent home after IV fluids and antiemetics    Patient has no complaints of headache no complaints of chest pain no complaints of shortness of breath no fever chills no urinary symptoms no back pain  Exam the patient is in no acute distress HEENT is unremarkable lungs clear heart regular abdomen soft mild tenderness positive bowel sounds  Critical Care Time  CritCare Time    Procedures

## 2018-05-24 NOTE — PLAN OF CARE
Problem: Potential for Falls  Goal: Patient will remain free of falls  INTERVENTIONS:  - Assess patient frequently for physical needs  -  Identify cognitive and physical deficits and behaviors that affect risk of falls    -  [place] fall precautions as indicated by assessment   - Educate patient/family on patient safety including physical limitations  - Instruct patient to call for assistance with activity based on assessment  - Modify environment to reduce risk of injury  - Consider OT/PT consult to assist with strengthening/mobility   Outcome: Progressing      Problem: GASTROINTESTINAL - ADULT  Goal: Minimal or absence of nausea and/or vomiting  INTERVENTIONS:  - Administer IV fluids as ordered to ensure adequate hydration  - Maintain NPO status until nausea and vomiting are resolved  - Nasogastric tube as ordered  - Administer ordered antiemetic medications as needed  - Provide nonpharmacologic comfort measures as appropriate  - Advance diet as tolerated, if ordered  - Nutrition services referral to assist patient with adequate nutrition and appropriate food choices  Outcome: Progressing      Problem: METABOLIC, FLUID AND ELECTROLYTES - ADULT  Goal: Electrolytes maintained within normal limits  INTERVENTIONS:  - Monitor labs and assess patient for signs and symptoms of electrolyte imbalances  - Administer electrolyte replacement as ordered  - Monitor response to electrolyte replacements, including repeat lab results as appropriate  - Instruct patient on fluid and nutrition as appropriate  Outcome: Progressing      Problem: SKIN/TISSUE INTEGRITY - ADULT  Goal: Skin integrity remains intact  INTERVENTIONS  - Identify patients at risk for skin breakdown  - Assess and monitor skin integrity  - Assess and monitor nutrition and hydration status  - Monitor labs (i e  albumin)  - Assess for incontinence   - Turn and reposition patient  - Assist with mobility/ambulation  - Relieve pressure over bony prominences  - Avoid friction and shearing  - Provide appropriate hygiene as needed including keeping skin clean and dry  - Evaluate need for skin moisturizer/barrier cream  - Collaborate with interdisciplinary team (i e  Nutrition, Rehabilitation, etc )   - Patient/family teaching  Outcome: Progressing      Problem: SAFETY ADULT  Goal: Patient will remain free of falls  INTERVENTIONS:  - Assess patient frequently for physical needs  -  Identify cognitive and physical deficits and behaviors that affect risk of falls  -  [place] fall precautions as indicated by assessment   - Educate patient/family on patient safety including physical limitations  - Instruct patient to call for assistance with activity based on assessment  - Modify environment to reduce risk of injury  - Consider OT/PT consult to assist with strengthening/mobility   Outcome: Progressing      Problem: DISCHARGE PLANNING  Goal: Discharge to home or other facility with appropriate resources  INTERVENTIONS:  - Identify barriers to discharge w/patient and caregiver  - Arrange for needed discharge resources and transportation as appropriate  - Identify discharge learning needs (meds, wound care, etc )  - Arrange for interpretive services to assist at discharge as needed  - Refer to Case Management Department for coordinating discharge planning if the patient needs post-hospital services based on physician/advanced practitioner order or complex needs related to functional status, cognitive ability, or social support system  Outcome: Progressing      Problem: Knowledge Deficit  Goal: Patient/family/caregiver demonstrates understanding of disease process, treatment plan, medications, and discharge instructions  Complete learning assessment and assess knowledge base    Interventions:  - Provide teaching at level of understanding  - Provide teaching via preferred learning methods  Outcome: Progressing

## 2018-05-24 NOTE — CASE MANAGEMENT
Initial Clinical Review    Admission: Date/Time/Statement:  05/23/18 2347    Orders Placed This Encounter   Procedures    Place in Observation (expected length of stay for this patient is less than two midnights)     Standing Status:   Standing     Number of Occurrences:   1     Order Specific Question:   Admitting Physician     Answer:   Jennifer Mejia [[id number]]     Order Specific Question:   Level of Care     Answer:   Med Surg [[id number]]         ED: Date/Time/Mode of Arrival:   ED Arrival Information     Expected Arrival Acuity Means of Arrival Escorted By Service Admission Type    5/23/2018 21:50 5/23/2018 22:05 Urgent Ambulance [facility] General Medicine Urgent    Arrival Complaint    vomiting/nausea          Chief Complaint:   Chief Complaint   Patient presents with    Vomiting     pt vomiting x 1 week  was seen last friday and treated for dehydration d/t vomiting       History of Illness:     Fran Llamas is a [de-identified] y o  female with extensive past medical history notable for insulin-dependant diabetes, CAD s/p CABG (2005), aortic stenosis s/p TAVR (4/3/18),  paroxsymal afib w/ recent cardioembolic CVA (7/70/65) now on Xarelto, chronic systolic/diastolic CHF presenting for abdominal pain, na/vom      The patient was recently admitted 4/28 through 5/7 with several days of worsening fatigue and intermittent vomiting  Soon after admission she experienced a generalized convulsive seizure and was transferred to the ICU  She was noted to have no further seizure activity on EMU, seizures suspected secondary to prior CVA in context of ongoing electrolyte abnormalities  During her prior admission she was additionally diuresed for HF, started on amiodarone for frequent episodes of NSVT  She was discharged to [facility]       She had presented to the ED last week with several days of abdominal pain with nausea and vomiting, poor PO intake   CT at the time with small bilateral intrarenal calculi, otherwise unremkarble At that time she was found to be hemodynamically stable with LEONARD (creat 1 89 from baseline ~ 9), given IV fluids with improvement in creatinine and discharged       The patient presents this admission with recurrent abdominal pain  She states that she was feeling improved after prior ED visit but yesterday developed loose stools, generalized abdominal pain and several episodes of non-bilious vomiting yesterday  Has had some decreased PO intake but was able to tolerate soup      ED Vital Signs:   ED Triage Vitals [05/23/18 2210]   Temperature Pulse Respirations Blood Pressure SpO2   97 8 °F (36 6 °C) 70 18 (!) 173/73 97 %      Temp Source Heart Rate Source Patient Position - Orthostatic VS BP Location FiO2 (%)   Tympanic Monitor Sitting Left arm --      Pain Score       No Pain        Wt Readings from Last 1 Encounters:   05/23/18 69 9 kg (154 lb)         Abnormal Labs/Diagnostic Test Results:     WBC 10 92 (H)   Hemoglobin 10 3 (L)   Hematocrit 33 6 (L)   MCV 70 (L)   MCH 21 5 (L)   MCHC 30 7 (L)   RDW 21 4 (H)       Creatinine 1 42 (H)   Calcium 6 7 (L)   Albumin 3 4 (L)     LACTIC ACID 2 1 (HH)     Phosphorus 4 9 (H)     Ct Abdomen Pelvis Wo Contrast   Impression: 1  Diffuse thickening of the gastric wall which may be seen in setting of gastritis  2   Liquid stool in the colon which may be seen in the setting of diarrheal illness  3   Stable bilateral nonobstructing nephrolithiasis      5-24  Magnesium 0 5 (LL)         ED Treatment:   Medication Administration from 05/23/2018 2150 to 05/24/2018 0250       Date/Time Order Dose Route     05/23/2018 2327 promethazine (PHENERGAN) injection 12 5 mg 12 5 mg Intravenous     05/23/2018 2326 sodium chloride 0 9 % bolus 500 mL 500 mL Intravenous       Past Medical/Surgical History:    Active Ambulatory Problems     Diagnosis Date Noted    Essential hypertension 12/19/2017    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 native heart without angina pectoris 12/19/2017    Type 2 diabetes mellitus with complication, with long-term current use of insulin ([facility] ) 12/19/2017    Atrial flutter ([address] ) 12/19/2017    Hyperlipidemia 12/19/2017    Gastroesophageal reflux disease without esophagitis 12/19/2017    Moderate mitral stenosis 12/19/2017    Arthritis of hand 12/26/2014    Acute respiratory failure with hypoxia ([address] ) 03/16/2018    Asthma 03/16/2018    Asymptomatic carotid artery stenosis, bilateral 11/16/2017    Atherosclerosis of artery of extremity with ulceration [facility] 06/09/2017    Hx of CABG 01/15/2018    Chronic anticoagulation 08/22/2016    Chronic combined systolic and diastolic congestive heart failure ([address] ) 01/15/2018    Degenerative joint disease involving multiple joints 07/28/2017    Diabetic retinopathy ([facility] ) 08/02/2016    Postoperative hypothyroidism 10/16/2013    Migraine headache 10/18/2017    Nephrolithiasis 12/17/2015    Osteoarthritis of both knees 11/23/2015    Paroxysmal atrial fibrillation ([facility] ) 01/15/2018    Ulcer of toe of left foot ([facility] ) 10/23/2017    Ulcer of toe of right foot ([facility] ) 03/26/2018    Prolonged Q-T interval on ECG 03/28/2018    Hypokalemia 04/03/2018    Hypocalcemia 04/03/2018    Left bundle branch block (LBBB) 04/03/2018    1st degree AV block 04/03/2018    S/P TAVR (transcatheter aortic valve replacement) 04/06/2018    Expressive aphasia 04/16/2018    Multiple lacunar infarcts ([address] ) 04/24/2018    Vomiting 04/28/2018    History of CVA (cerebrovascular accident) 04/28/2018    Seizure ([address] ) 04/29/2018    Supratherapeutic INR 04/29/2018    NSVT (nonsustained ventricular tachycardia) ([address] ) 04/29/2018    Pulmonary hypertension ([address] ) 04/30/2018    Anemia 04/30/2018    Severe sepsis ([address] ) 05/01/2018    Lactic acidosis 05/01/2018     Resolved Ambulatory Problems     Diagnosis Date Noted    NSTEMI (non-ST elevated myocardial infarction) ([address] ) 12/19/2017    Critical aortic valve stenosis 12/19/2017    LEONARD (acute kidney injury) ([facility] ) 12/19/2017    Acute combined systolic and diastolic congestive heart failure ([facility] ) 07/05/2016    URI, acute 03/12/2018    Acute cardiogenic pulmonary edema (HCC) 03/15/2018    Atrial fibrillation with rapid ventricular response (HCC)     Acute on chronic diastolic CHF (congestive heart failure) ([facility] ) 03/28/2018   [facility] discharge follow-up 03/28/2018    Acute respiratory insufficiency, postoperative 04/03/2018    CVA (cerebral vascular accident) ([facility] ) 04/17/2018    Encephalopathy acute 04/28/2018    Altered mental status      Past Medical History:   Diagnosis Date    Altered mental status     Anticoagulated     Asthma     Atrial flutter ([facility])     CAD (coronary artery disease)     Candidiasis     Carotid stenosis, bilateral     Cataract     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) ([facility])     Chronic fatigue syndrome     Chronic low back pain     Concussion w loss of consciousness of unsp duration, init     Coronary artery disease     CVA (cerebral vascular accident) ([place] [id number] ) 4/17/2018    Diabetes mellitus ([place] [address] )     DJD (degenerative joint disease)     GERD (gastroesophageal reflux disease)     Herpes zoster     HLD (hyperlipidemia)     HTN (hypertension)     Hyperlipidemia     Hypothyroidism     Irritable bowel syndrome     Lumbar radiculopathy     Lyme disease     Lyme disease     MI (myocardial infarction) ([facility] )     Migraines     Muscle spasm     Non-neoplastic nevus     Nontoxic multinodular goiter     OA (osteoarthritis)     Osteoarthritis     Other chronic pain     PAD (peripheral artery disease) ([facility])     Palpitations     Pseudogout     Spinal stenosis     Transient cerebral ischemia     Trigger ring finger     Viral gastroenteritis        Admitting Diagnosis: Vomiting [R11 10]    Age/Sex: [de-identified] y o  female    Assessment/Plan:     Abdominal pain: Unclear etiology, viral gastroenteritis / food poisoning / secondary to hypocalcemia  Has hx  nephrolithiasis but she does not feel like she is passing stone  CT AP c/w possible gastroenteritis  Slight lactate on admission likely secondary to volume depletion from loose stools/vomiting, now cleared with 500cc bolus  Not septic appearing, ab exam benign  Continue gentle hydration 50cc, trial on clear liquid diet/ADAT, check and replete lytes  Started on reglan, will hold for now as with prolonged qTC, anti-emetics per day team       Hypocalcemia: Chronic  Etiology unclear, prior admission with persistently low mag/potassium/tiera despite signficant repletion  No urgent indication repletion given chronicity, though many benefit from workup of persistently low lytes  Will check mag/phos and replete       1st degree AV block, QTc prolongation: ID increased to 320ms, QTc 540  Check and replete lytes   Caution QTc prologing agents, will hold reglan for now      Admission Orders:    REPEAT CMP   MAG  FINGER STICK GLUCOSE   CLEAR LIQUID DIET   SEQUENTIAL COMPRESSION DEVICE       Scheduled Meds:   Current Facility-Administered Medications:  acetaminophen 650 mg Oral Q6H PRN   albuterol 2 puff Inhalation BID PRN   amiodarone 200 mg Oral BID   aspirin 81 mg Oral Daily   atorvastatin 80 mg Oral After Dinner   calcium carbonate 1,000 mg Oral TID   calcium gluconate 2 g Intravenous Once   cholecalciferol 1,000 Units Oral Daily   docusate sodium 100 mg Oral BID   ferrous sulfate 325 mg Oral Daily With Breakfast   fluticasone 1 spray Nasal Daily   fluticasone 2 puff Inhalation Daily   furosemide 40 mg Oral BID   insulin glargine 20 Units Subcutaneous BID   insulin lispro 1-5 Units Subcutaneous TID AC   insulin lispro 1-5 Units Subcutaneous HS   levETIRAcetam 750 mg Oral Q12H [address]   levothyroxine 100 mcg Oral Early Morning   lisinopril 2 5 mg Oral HS   loratadine 10 mg Oral Daily   magnesium sulfate 4 g Intravenous Once   metoprolol tartrate 100 mg Oral Q12H [address] pantoprazole 40 mg Oral Early Morning   polyethylene glycol 17 g Oral Daily   potassium chloride 10 mEq Oral BID   rivaroxaban 15 mg Oral Daily   sodium chloride 50 mL/hr Intravenous Continuous     Continuous Infusions:   sodium chloride 50 mL/hr     PRN Meds:   acetaminophen    albuterol

## 2018-05-24 NOTE — SOCIAL WORK
Met with pt and explained CM role  Pt stated that she was admitted from  and she would like to return upon discharge  Pt does not have a POA  Primary contact is pt's son, Chacorta Moulton, contact # [phone]  Alternate contact is pt's son Tesfaye Jama, contact # [phone]  [facility] referral sent to   CM contacted  and spoke with Guillermo Saini LPN at , to obtain pt's baseline information  She stated that pt was independent with ADLs and meals  Pt was independent with ambulation with a walker  The facility provides pt with her medications

## 2018-05-24 NOTE — RESTORATIVE TECHNICIAN NOTE
Restorative Specialist Mobility Note       Activity: Ambulate in morrison, Ambulate in room, Bathroom privileges, Dangle, Stand at bedside (Educated/encouraged pt to ambulate with assistance 3-4 x's/day  Bed alarm on   Pt callbell, phone/tray within reach )     Assistive Device: Front wheel walker          Arpita PERALTA, Restorative Technician, [employer] [facility]

## 2018-05-24 NOTE — SOCIAL WORK
MCG Guide Used for Initial Round: Gastritis and Duodenitis RRG  Optimal GLOS: 1  Hospital Day: 0 days  DC Readiness:   Discharge Readiness  Return to top of Gastritis and Duodenitis RRG - ISC  · Discharge readiness is indicated by patient meeting Recovery Milestones, including ALL of the following:  ? Hemodynamic stability  ? Immediate inpatient surgery not needed  ? Bleeding absent or blood counts stable  ? Vomiting absent or managed  ? Electrolytes normal or acceptable for next level of care  ? Pain absent or managed  ? Oral hydration  ? Liquid or advanced diet  ? Ambulatory[I]  ? Discharge plans and education understood    Identified Barriers: monitor stool outpt  Stool sent for c diff - pending    Discussion Date (Time): 05/24/18 chart review

## 2018-05-25 PROBLEM — R19.7 VOMITING AND DIARRHEA: Status: RESOLVED | Noted: 2018-05-24 | Resolved: 2018-05-25

## 2018-05-25 PROBLEM — R11.10 VOMITING AND DIARRHEA: Status: RESOLVED | Noted: 2018-05-24 | Resolved: 2018-05-25

## 2018-05-25 PROBLEM — R56.9 SEIZURE (HCC): Status: RESOLVED | Noted: 2018-04-29 | Resolved: 2018-05-25

## 2018-05-25 LAB
ALBUMIN SERPL BCP-MCNC: 2.8 G/DL (ref 3.5–5)
ALP SERPL-CCNC: 45 U/L (ref 46–116)
ALT SERPL W P-5'-P-CCNC: 14 U/L (ref 12–78)
ANION GAP SERPL CALCULATED.3IONS-SCNC: 8 MMOL/L (ref 4–13)
AST SERPL W P-5'-P-CCNC: 19 U/L (ref 5–45)
BILIRUB SERPL-MCNC: 0.44 MG/DL (ref 0.2–1)
BUN SERPL-MCNC: 15 MG/DL (ref 5–25)
CALCIUM SERPL-MCNC: 6.8 MG/DL (ref 8.3–10.1)
CHLORIDE SERPL-SCNC: 108 MMOL/L (ref 100–108)
CO2 SERPL-SCNC: 25 MMOL/L (ref 21–32)
CREAT SERPL-MCNC: 0.98 MG/DL (ref 0.6–1.3)
GFR SERPL CREATININE-BSD FRML MDRD: 55 ML/MIN/1.73SQ M
GLUCOSE SERPL-MCNC: 142 MG/DL (ref 65–140)
GLUCOSE SERPL-MCNC: 206 MG/DL (ref 65–140)
GLUCOSE SERPL-MCNC: 267 MG/DL (ref 65–140)
GLUCOSE SERPL-MCNC: 79 MG/DL (ref 65–140)
GLUCOSE SERPL-MCNC: 80 MG/DL (ref 65–140)
MAGNESIUM SERPL-MCNC: 1.6 MG/DL (ref 1.6–2.6)
POTASSIUM SERPL-SCNC: 3.5 MMOL/L (ref 3.5–5.3)
PROT SERPL-MCNC: 6.2 G/DL (ref 6.4–8.2)
SODIUM SERPL-SCNC: 141 MMOL/L (ref 136–145)

## 2018-05-25 PROCEDURE — 99225 PR SBSQ OBSERVATION CARE/DAY 25 MINUTES: CPT | Performed by: INTERNAL MEDICINE

## 2018-05-25 PROCEDURE — 82948 REAGENT STRIP/BLOOD GLUCOSE: CPT

## 2018-05-25 PROCEDURE — 80053 COMPREHEN METABOLIC PANEL: CPT | Performed by: INTERNAL MEDICINE

## 2018-05-25 PROCEDURE — 83735 ASSAY OF MAGNESIUM: CPT | Performed by: INTERNAL MEDICINE

## 2018-05-25 RX ORDER — MAGNESIUM CARB/ALUMINUM HYDROX 105-160MG
296 TABLET,CHEWABLE ORAL DAILY
Status: DISCONTINUED | OUTPATIENT
Start: 2018-05-25 | End: 2018-05-25

## 2018-05-25 RX ADMIN — INSULIN GLARGINE 20 UNITS: 100 INJECTION, SOLUTION SUBCUTANEOUS at 08:57

## 2018-05-25 RX ADMIN — LEVETIRACETAM 750 MG: 750 TABLET ORAL at 08:47

## 2018-05-25 RX ADMIN — POTASSIUM CHLORIDE 10 MEQ: 750 TABLET, EXTENDED RELEASE ORAL at 17:20

## 2018-05-25 RX ADMIN — LEVOTHYROXINE SODIUM 100 MCG: 100 TABLET ORAL at 06:40

## 2018-05-25 RX ADMIN — Medication 1000 MG: at 08:46

## 2018-05-25 RX ADMIN — METOPROLOL TARTRATE 100 MG: 50 TABLET ORAL at 08:47

## 2018-05-25 RX ADMIN — SODIUM CHLORIDE 50 ML/HR: 0.9 INJECTION, SOLUTION INTRAVENOUS at 01:59

## 2018-05-25 RX ADMIN — DOCUSATE SODIUM 100 MG: 100 CAPSULE, LIQUID FILLED ORAL at 08:45

## 2018-05-25 RX ADMIN — DOCUSATE SODIUM 100 MG: 100 CAPSULE, LIQUID FILLED ORAL at 17:20

## 2018-05-25 RX ADMIN — FLUTICASONE PROPIONATE 1 SPRAY: 50 SPRAY, METERED NASAL at 08:50

## 2018-05-25 RX ADMIN — LORATADINE 10 MG: 10 TABLET ORAL at 08:47

## 2018-05-25 RX ADMIN — ASPIRIN 81 MG 81 MG: 81 TABLET ORAL at 08:47

## 2018-05-25 RX ADMIN — SODIUM CHLORIDE 50 ML/HR: 0.9 INJECTION, SOLUTION INTRAVENOUS at 20:47

## 2018-05-25 RX ADMIN — AMIODARONE HYDROCHLORIDE 200 MG: 200 TABLET ORAL at 17:20

## 2018-05-25 RX ADMIN — ATORVASTATIN CALCIUM 80 MG: 80 TABLET, FILM COATED ORAL at 17:20

## 2018-05-25 RX ADMIN — INSULIN LISPRO 1 UNITS: 100 INJECTION, SOLUTION INTRAVENOUS; SUBCUTANEOUS at 17:21

## 2018-05-25 RX ADMIN — VITAMIN D, TAB 1000IU (100/BT) 1000 UNITS: 25 TAB at 08:46

## 2018-05-25 RX ADMIN — RIVAROXABAN 15 MG: 15 TABLET, FILM COATED ORAL at 08:45

## 2018-05-25 RX ADMIN — METOPROLOL TARTRATE 100 MG: 50 TABLET ORAL at 21:13

## 2018-05-25 RX ADMIN — Medication 1000 MG: at 17:20

## 2018-05-25 RX ADMIN — LISINOPRIL 2.5 MG: 2.5 TABLET ORAL at 21:14

## 2018-05-25 RX ADMIN — Medication 400 MG: at 17:20

## 2018-05-25 RX ADMIN — AMIODARONE HYDROCHLORIDE 200 MG: 200 TABLET ORAL at 08:45

## 2018-05-25 RX ADMIN — INSULIN LISPRO 2 UNITS: 100 INJECTION, SOLUTION INTRAVENOUS; SUBCUTANEOUS at 11:11

## 2018-05-25 RX ADMIN — INSULIN GLARGINE 20 UNITS: 100 INJECTION, SOLUTION SUBCUTANEOUS at 17:21

## 2018-05-25 RX ADMIN — POTASSIUM CHLORIDE 10 MEQ: 750 TABLET, EXTENDED RELEASE ORAL at 08:47

## 2018-05-25 RX ADMIN — Medication 325 MG: at 08:45

## 2018-05-25 RX ADMIN — FLUTICASONE PROPIONATE 2 PUFF: 110 AEROSOL, METERED RESPIRATORY (INHALATION) at 08:50

## 2018-05-25 RX ADMIN — PANTOPRAZOLE SODIUM 40 MG: 40 TABLET, DELAYED RELEASE ORAL at 06:40

## 2018-05-25 RX ADMIN — LEVETIRACETAM 750 MG: 750 TABLET ORAL at 21:13

## 2018-05-25 NOTE — PROGRESS NOTES
IM Residency Progress Note   Unit/Bed#: [facility] 719-01 Encounter: [id number]  SOD Team C       Tom Bui [de-identified] y o  female [id number]    Hospital Stay Days: 1      Assessment/Plan:    Principal Problem:    Vomiting and diarrhea  Active Problems:    Essential hypertension    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 native heart without angina pectoris    Type 2 diabetes mellitus with complication, with long-term current use of insulin (HCC)    Atrial flutter (HCC)    Hyperlipidemia    Gastroesophageal reflux disease without esophagitis    Moderate mitral stenosis    Asthma    Hx of CABG    Chronic anticoagulation    Chronic combined systolic and diastolic congestive heart failure (HCC)    Postoperative hypothyroidism    Paroxysmal atrial fibrillation (HCC)    Seizure (HCC)    Abdominal pain - resolved  -Likely related to hypocalcemia and hypomagnesemia    LEONARD - improved  -Likely iatrogenic  -Continue to hold furosemide    QTc prolongation  -Hold and avoid further prolonging agents  -Continue magnesium at discarge    History of NSVT  -Continue amiodarone    Chronic diastolic heart failure s/p AVR and CAD s/p CABG  -Hold furosemide  -Continue BB/ASA/Statin/ACEI    Atrial fibrillation  -Continue Xarelto and BB with rate control strategy  Hx CVA   -Continue Xarleto    DM2  -SSI    COPD - stable    Hypothyroidism - stable  -Continue synthroid  PVD  -LE wounds without evidence of cellulitis  -FU with vascular surgery as outpatient  Disposition: Discharge  Continue PO magnesium at discharge  Labs in 1 week with follow up  Subjective:   Patient feels well  She has no further epigastric pain or nausea  She has had only one BM since admission  No fevers or chills         Vitals: Temp (24hrs), Av 4 °F (36 9 °C), Min:98 1 °F (36 7 °C), Max:98 8 °F (37 1 °C)  Current: Temperature: 98 1 °F (36 7 °C)  Vitals:    18 0917 18 1540 18 2000 18 2315   BP: 153/61 119/55 130/55 113/52   BP Location:  Left arm Left arm Left arm   Pulse: 81 58 61 60   Resp:  18 18 18   Temp:  98 4 °F (36 9 °C) 98 1 °F (36 7 °C) 98 1 °F (36 7 °C)   TempSrc:  Oral Oral Oral   SpO2:  99% 96% 97%   Weight:       Height:        Body mass index is 28 17 kg/m²  I/O last 24 hours: In: 2098 3 [P O :730; I V :1168 3; IV Piggyback:200]  Out: 200 [Urine:200]      Physical Exam:   Gen: Lying in bed, NAD  HEENT: No injection, mmm  CV: RRR  Pulm: CTA BL  ABD: NT/ND  Extr: No peripheral edema       Invasive Devices     Peripheral Intravenous Line            Peripheral IV 05/23/18 Right Forearm 1 day                   Labs:   Recent Results (from the past 24 hour(s))   Fingerstick Glucose (POCT)    Collection Time: 05/24/18 11:11 AM   Result Value Ref Range    POC Glucose 100 65 - 140 mg/dl   Fingerstick Glucose (POCT)    Collection Time: 05/24/18  4:07 PM   Result Value Ref Range    POC Glucose 205 (H) 65 - 140 mg/dl   Fingerstick Glucose (POCT)    Collection Time: 05/24/18  9:06 PM   Result Value Ref Range    POC Glucose 202 (H) 65 - 140 mg/dl   Comprehensive metabolic panel    Collection Time: 05/25/18  4:36 AM   Result Value Ref Range    Sodium 141 136 - 145 mmol/L    Potassium 3 5 3 5 - 5 3 mmol/L    Chloride 108 100 - 108 mmol/L    CO2 25 21 - 32 mmol/L    Anion Gap 8 4 - 13 mmol/L    BUN 15 5 - 25 mg/dL    Creatinine 0 98 0 60 - 1 30 mg/dL    Glucose 80 65 - 140 mg/dL    Calcium 6 8 (L) 8 3 - 10 1 mg/dL    AST 19 5 - 45 U/L    ALT 14 12 - 78 U/L    Alkaline Phosphatase 45 (L) 46 - 116 U/L    Total Protein 6 2 (L) 6 4 - 8 2 g/dL    Albumin 2 8 (L) 3 5 - 5 0 g/dL    Total Bilirubin 0 44 0 20 - 1 00 mg/dL    eGFR 55 ml/min/1 73sq m   Magnesium    Collection Time: 05/25/18  4:36 AM   Result Value Ref Range    Magnesium 1 6 1 6 - 2 6 mg/dL   Fingerstick Glucose (POCT)    Collection Time: 05/25/18  6:29 AM   Result Value Ref Range    POC Glucose 79 65 - 140 mg/dl       Radiology Results: I have personally reviewed pertinent reports  Ct Abdomen Pelvis Wo Contrast    Result Date: 5/23/2018  Impression: 1  Diffuse thickening of the gastric wall which may be seen in setting of gastritis  2   Liquid stool in the colon which may be seen in the setting of diarrheal illness  3   Stable bilateral nonobstructing nephrolithiasis   Workstation performed: [id number]       Active Meds:   Current Facility-Administered Medications   Medication Dose Route Frequency    acetaminophen (TYLENOL) tablet 650 mg  650 mg Oral Q6H PRN    albuterol (PROVENTIL HFA,VENTOLIN HFA) inhaler 2 puff  2 puff Inhalation BID PRN    amiodarone tablet 200 mg  200 mg Oral BID    aspirin chewable tablet 81 mg  81 mg Oral Daily    atorvastatin (LIPITOR) tablet 80 mg  80 mg Oral After Dinner    calcium carbonate (TUMS) chewable tablet 1,000 mg  1,000 mg Oral TID    cholecalciferol (VITAMIN D3) tablet 1,000 Units  1,000 Units Oral Daily    docusate sodium (COLACE) capsule 100 mg  100 mg Oral BID    ferrous sulfate tablet 325 mg  325 mg Oral Daily With Breakfast    fluticasone (FLONASE) 50 mcg/act nasal spray 1 spray  1 spray Nasal Daily    fluticasone (FLOVENT HFA) 110 MCG/ACT inhaler 2 puff  2 puff Inhalation Daily    insulin glargine (LANTUS) subcutaneous injection 20 Units 0 2 mL  20 Units Subcutaneous BID    insulin lispro (HumaLOG) 100 units/mL subcutaneous injection 1-5 Units  1-5 Units Subcutaneous TID AC    insulin lispro (HumaLOG) 100 units/mL subcutaneous injection 1-5 Units  1-5 Units Subcutaneous HS    levETIRAcetam (KEPPRA) tablet 750 mg  750 mg Oral Q12H LANCE    levothyroxine tablet 100 mcg  100 mcg Oral Early Morning    lisinopril (ZESTRIL) tablet 2 5 mg  2 5 mg Oral HS    loratadine (CLARITIN) tablet 10 mg  10 mg Oral Daily    metoprolol tartrate (LOPRESSOR) tablet 100 mg  100 mg Oral Q12H LANCE    pantoprazole (PROTONIX) EC tablet 40 mg  40 mg Oral Early Morning    polyethylene glycol (MIRALAX) packet 17 g  17 g Oral Daily  potassium chloride (K-DUR,KLOR-CON) CR tablet 10 mEq  10 mEq Oral BID    rivaroxaban (XARELTO) tablet 15 mg  15 mg Oral Daily    sodium chloride 0 9 % infusion  50 mL/hr Intravenous Continuous         VTE Pharmacologic Prophylaxis: Full anticoagulation      Erenest Homans

## 2018-05-25 NOTE — NURSING NOTE
Dr Nitza Keith at bedside and notified of pt foots wounds  Per pt these have been worsening recently  All wounds painted with betadine this AM as pt reports this is her daily routine at home  No interventions ordered at this time   Will cont to monitor

## 2018-05-25 NOTE — DISCHARGE INSTRUCTIONS
Continue to take magnesium supplement  Have blood work drawn in approximately 1 week  Follow up with family doctor in 1-2 weeks

## 2018-05-25 NOTE — RESTORATIVE TECHNICIAN NOTE
Restorative Specialist Mobility Note       Activity: Ambulate in morrison, Ambulate in room, Bathroom privileges, Dangle, Stand at bedside (Educated/encouraged pt to ambulate with assistance 3-4 x's/day  Bed alarm on   Pt callbell, phone/tray within reach )     Assistive Device: Front wheel walker          [employer] BS, Restorative Technician, [employer]

## 2018-05-25 NOTE — PLAN OF CARE
DISCHARGE PLANNING     Discharge to home or other facility with appropriate resources Progressing        DISCHARGE PLANNING - CARE MANAGEMENT     Discharge to post-acute care or home with appropriate resources Progressing        GASTROINTESTINAL - ADULT     Minimal or absence of nausea and/or vomiting Progressing        INFECTION - ADULT     Absence or prevention of progression during hospitalization Progressing        Knowledge Deficit     Patient/family/caregiver demonstrates understanding of disease process, treatment plan, medications, and discharge instructions Progressing        METABOLIC, FLUID AND ELECTROLYTES - ADULT     Electrolytes maintained within normal limits Progressing        Nutrition/Hydration-ADULT     Nutrient/Hydration intake appropriate for improving, restoring or maintaining nutritional needs Progressing        PAIN - ADULT     Verbalizes/displays adequate comfort level or baseline comfort level Progressing        Potential for Falls     Patient will remain free of falls Progressing        Prexisting or High Potential for Compromised Skin Integrity     Skin integrity is maintained or improved Progressing        SAFETY ADULT     Patient will remain free of falls Progressing        SKIN/TISSUE INTEGRITY - ADULT     Skin integrity remains intact Progressing Physical Therapy Daily Treatment      Visit Count: 10  Plan of Care Dates: Initial: 3/29/2017 Through: 7/5/2017  Insurance Information: HARD Visit Limit: 60 visits per calendar year  Next Referring Provider Visit: 5/30/17 (last PN: 4/17/17)     Referred by: EDWINA Martinez*  Medical Diagnosis (from order): S/p right hip scope  Treatment Diagnosis: Hip Symptoms with Pain, Impaired Joint Mobility, Impaired Range of Motion and Impaired Motor Function/Muscle Performance  Insurance: 1. [employer] 2. N/A     Date of Surgery: 3/14/17; surgery performed: Right hip arthroscopy, rim trimming osteoplasty, unloading femoral osteoplasty, arthroscopic labral repair, mechanical chondroplasty of acetabular rim; rehabilitation guidelines: simple hip protocol  Diagnosis Precautions: none  Relevant co-morbidities and medications: None noted   Relevant Tests: Relevant diagnostic tests: plain film radiograph, MRI      Medical/surgical history, medications and relevant tests have been reviewed.    SUBJECTIVE   Patient has returned to a full 40 hour work week without issue. Does state that he is tired by the end of the day, but whole body, not just the hip. Has not been working out as much lately due to being tired from work. Rates pain as 0/10.    OBJECTIVE   Patient is 8 weeks s/p right hip scope: labral repair as of 5/9/17    TREATMENT:     Therapeutic Exercise:   Upright bike x 3' for soft tissue warm-up  Side plank with hip abduction 2 x 10 Right/Left (upper extremity assist as needed)  Prone plank with alternate hip extension 2 x 5 Right/Left   Forward lunge with 6# med ball trunk rotation 2 x 20 yards  Tall kneeling to half kneeling 1 x 10 Right/Left   Tall kneeling clamshell 1 x 10 Right/Left   6\" anterior heel tap 2 x 15 Right/Left   Single leg squat to 24\" box 2 x 10 Right/Left   Multi-hip: flexion, adduction 2 x 10 45#    Neuromuscular Re-education:  Forward lunge onto BOSU dome up 1 x 10 Right/Left   Single  leg balance on BOSU dome up Right/Left   Single leg balance on BOSU base up Right/Left   Single leg RDL on BOSU 1 x 10 Right/Left     Manual Therapy:   Hip circumduction in supine  Hip PROM: Internal rotation, External rotation, abduction, flexion  Supine hamstring stretch 2 x 30\" left    Prone anterior hip mobilization with foam roller under thigh  Prone hip flexor stretch in pelvis locked position  Nicolas stretch Right/Left     Current Home Program (not performed this date except as noted above):     3/29/17: Ankle pumps, Heel slides, Quad set, Pelvic tilts, Prone terminal knee extension, Prone positioning, Prone knee flexion AROM, Hook lying bridge, Band walks, clams, hip abduction with band  4/17/17: ankle DF self mobilization  5/8/17: forward lunges     ASSESSMENT   Patient challenged with progression of hip/core strengthening exercises especially with planks. He tolerated balance on uneven surfaces well, but was quick to fatigue. Patient able to perform a single leg squat without much difficulty.    Pain after treatment: 0/10  Patient okay to add lungs to HEP. Result of above outlined education: Verbalizes understanding and Demonstrates understanding      Goals:       To be obtained by end of this plan of care:  1. Patient independent with modified and progressed home exercise program.  2. Patient will decrease involved hip pain to 0/10 to aid in completing transfers including low and soft surfaces and age appropriate activities.  NOT MET  3. Patient will increase involved hip active range of motion to within 5° of the left to aid in age appropriate activities and squatting for lifting for household independent living. NOT MET  4. Patient will increase involved hip strength to 5/5 to aid in age appropriate activities and squatting for lifting for household independent living.  NOT MET  5. Patient will demonstrate ability to negotiate level and unlevel surfaces at variable velocities, including change of  direction without increased pain or instability to return to age appropriate and community activities at prior level of function. NOT MET  6. Patient will be able to complete transfers including toilet and car independently, without  Pain/difficulty. MET  7. Lower Extremity Functional Scale: Patient will complete form to reflect an improved score from initial score of 58 to greater than or equal to 72 (0=extreme difficulty; 80=no difficulty) to indicate pt reported improvement in function/disability/impairment (minimal detectable change: 9 points).   NOT ASSESED    PLAN   Follow simple labral repair protocol. Hip mobility - mobilization, balance    THERAPY DAILY BILLING   Primary Insurance: [employer]  Secondary Insurance: N/A    Evaluation Procedures:  No evaluation codes were used on this date of service    Timed Procedures:  Manual Therapy, 10 minutes  Neuromuscular Re-Education, 10 minutes  Therapeutic Exercise, 25 minutes    Untimed Procedures:  No untimed codes were used on this date of service    Total Treatment Time: 45 minutes

## 2018-05-25 NOTE — INCIDENTAL FINDINGS
[facility] CENTRAL Discharge Summary - Medical Mejia Fox [de-identified] y o  female MRN: [id number]    [address] [facility] 7 Room / Bed: [facility] 719/[facility] 06-35 Encounter: [id number]    BRIEF OVERVIEW    Admitting Provider: Gina Tierney MD  Discharge Provider: Gina Tierney MD  Primary Care Physician at Discharge: Carmen Grijalva MD    Discharge To: Home    Admission Date: 5/23/2018     Discharge Date: 5/26/2018  3:10 PM    Primary Discharge Diagnosis  Principal Problem:    Vomiting and diarrhea  Active Problems:    Essential hypertension    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 native heart without angina pectoris    Type 2 diabetes mellitus with complication, with long-term current use of insulin (HCC)    Atrial flutter (HCC)    Hyperlipidemia    Gastroesophageal reflux disease without esophagitis    Moderate mitral stenosis    Asthma    Hx of CABG    Chronic anticoagulation    Chronic combined systolic and diastolic congestive heart failure (HCC)    Postoperative hypothyroidism    Paroxysmal atrial fibrillation ([address] )    Seizure ([address] )  Resolved Problems:    * No resolved hospital problems  *        Diagnostic Procedures Performed  Ct Abdomen Pelvis Wo Contrast    Result Date: 5/23/2018  Impression: 1  Diffuse thickening of the gastric wall which may be seen in setting of gastritis  2   Liquid stool in the colon which may be seen in the setting of diarrheal illness  3   Stable bilateral nonobstructing nephrolithiasis  Workstation performed: [id number]       Discharge Disposition: Home/Self Care  Discharged With Lines: no    Test Results Pending at Discharge: None    Outpatient Follow-Up  To be scheduled with PCP  Code Status: Level 1 - Full Code    Medications   See after visit summary for reconciled discharge medications provided to patient and family      Allergies  Allergies   Allergen Reactions    Other Chest Pain     IVP-listed as "chest pain" in previous chart, patient stated this occurred "a long time ago" but does not remember exactly occurred     Cephalosporins Chest Pain    Contrast [Iodinated Diagnostic Agents] Other (See Comments)     Flash pulm edema    Doxycycline Chest Pain    Levaquin [Levofloxacin] Chest Pain    Ondansetron Chest Pain     Prolonged QT    Toradol [Ketorolac Tromethamine] Chest Pain     Discharge Diet: cardiac diet and diabetic diet  Activity restrictions: none    [address] Course  Patient presented with nausea, vomiting and diarrhea with noted volume contraction and LEONARD as noted in HPI  She was volume expanded with normal saline with improvement overnight of Cr  She had noted decrease in serum magnesium and calcium, which was repleted with IV formulation  She Had her diet advanced from clears to normal diet within 24 hours  Her diarrhea improved and she tolerated further food PO    Upon attempt to discharge back to rehabilitation it was noted that the patient had completed her acute rehabilitation stay  She was therefore held overnight to ensure safe discharge to home environment  Of note, the patient is to hold furosemide but continue to limit salt and fluid intake  She understands this at time of discharge      Presenting Problem/History of Present Illness  Principal Problem:    Vomiting and diarrhea  Active Problems:    Essential hypertension    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 native heart without angina pectoris    Type 2 diabetes mellitus with complication, with long-term current use of insulin (HCC)    Atrial flutter (HCC)    Hyperlipidemia    Gastroesophageal reflux disease without esophagitis    Moderate mitral stenosis    Asthma    Hx of CABG    Chronic anticoagulation    Chronic combined systolic and diastolic congestive heart failure (HCC)    Postoperative hypothyroidism    Paroxysmal atrial fibrillation ([address] )    Seizure ([facility] [address] )  Resolved Problems:    * No resolved hospital problems  *        Discharge Condition: fair      Discharge  Statement   I spent 30 minutes minutes discharging the patient  This time was spent on the day of discharge  I had direct contact with the patient on the day of discharge  Additional documentation is required if more than 30 minutes were spent on discharge

## 2018-05-26 VITALS
TEMPERATURE: 98.3 F | HEART RATE: 55 BPM | BODY MASS INDEX: 28.34 KG/M2 | HEIGHT: 62 IN | SYSTOLIC BLOOD PRESSURE: 161 MMHG | RESPIRATION RATE: 18 BRPM | WEIGHT: 154 LBS | OXYGEN SATURATION: 96 % | DIASTOLIC BLOOD PRESSURE: 61 MMHG

## 2018-05-26 PROBLEM — E83.42 HYPOMAGNESEMIA: Status: ACTIVE | Noted: 2018-05-26

## 2018-05-26 LAB
ANION GAP SERPL CALCULATED.3IONS-SCNC: 6 MMOL/L (ref 4–13)
BUN SERPL-MCNC: 11 MG/DL (ref 5–25)
CALCIUM SERPL-MCNC: 7.1 MG/DL (ref 8.3–10.1)
CHLORIDE SERPL-SCNC: 110 MMOL/L (ref 100–108)
CO2 SERPL-SCNC: 24 MMOL/L (ref 21–32)
CREAT SERPL-MCNC: 0.85 MG/DL (ref 0.6–1.3)
GFR SERPL CREATININE-BSD FRML MDRD: 65 ML/MIN/1.73SQ M
GLUCOSE SERPL-MCNC: 245 MG/DL (ref 65–140)
GLUCOSE SERPL-MCNC: 69 MG/DL (ref 65–140)
GLUCOSE SERPL-MCNC: 77 MG/DL (ref 65–140)
MAGNESIUM SERPL-MCNC: 1.3 MG/DL (ref 1.6–2.6)
POTASSIUM SERPL-SCNC: 3.7 MMOL/L (ref 3.5–5.3)
SODIUM SERPL-SCNC: 140 MMOL/L (ref 136–145)

## 2018-05-26 PROCEDURE — 99217 PR OBSERVATION CARE DISCHARGE MANAGEMENT: CPT | Performed by: INTERNAL MEDICINE

## 2018-05-26 PROCEDURE — 82948 REAGENT STRIP/BLOOD GLUCOSE: CPT

## 2018-05-26 PROCEDURE — 80048 BASIC METABOLIC PNL TOTAL CA: CPT | Performed by: INTERNAL MEDICINE

## 2018-05-26 PROCEDURE — 83735 ASSAY OF MAGNESIUM: CPT | Performed by: INTERNAL MEDICINE

## 2018-05-26 RX ORDER — LEVETIRACETAM 750 MG/1
750 TABLET ORAL EVERY 12 HOURS SCHEDULED
Qty: 60 TABLET | Refills: 0 | Status: SHIPPED | OUTPATIENT
Start: 2018-05-26 | End: 2018-05-29 | Stop reason: SDUPTHER

## 2018-05-26 RX ORDER — AMIODARONE HYDROCHLORIDE 200 MG/1
200 TABLET ORAL 2 TIMES DAILY
Qty: 30 TABLET | Refills: 0 | Status: SHIPPED | OUTPATIENT
Start: 2018-05-26 | End: 2018-06-19 | Stop reason: SDUPTHER

## 2018-05-26 RX ADMIN — ASPIRIN 81 MG 81 MG: 81 TABLET ORAL at 08:10

## 2018-05-26 RX ADMIN — Medication 325 MG: at 08:10

## 2018-05-26 RX ADMIN — AMIODARONE HYDROCHLORIDE 200 MG: 200 TABLET ORAL at 08:10

## 2018-05-26 RX ADMIN — FLUTICASONE PROPIONATE 1 SPRAY: 50 SPRAY, METERED NASAL at 08:14

## 2018-05-26 RX ADMIN — FLUTICASONE PROPIONATE 2 PUFF: 110 AEROSOL, METERED RESPIRATORY (INHALATION) at 08:14

## 2018-05-26 RX ADMIN — LEVETIRACETAM 750 MG: 750 TABLET ORAL at 08:10

## 2018-05-26 RX ADMIN — METOPROLOL TARTRATE 100 MG: 50 TABLET ORAL at 08:10

## 2018-05-26 RX ADMIN — INSULIN GLARGINE 20 UNITS: 100 INJECTION, SOLUTION SUBCUTANEOUS at 08:10

## 2018-05-26 RX ADMIN — PANTOPRAZOLE SODIUM 40 MG: 40 TABLET, DELAYED RELEASE ORAL at 08:09

## 2018-05-26 RX ADMIN — Medication 1000 MG: at 08:10

## 2018-05-26 RX ADMIN — VITAMIN D, TAB 1000IU (100/BT) 1000 UNITS: 25 TAB at 08:10

## 2018-05-26 RX ADMIN — INSULIN LISPRO 2 UNITS: 100 INJECTION, SOLUTION INTRAVENOUS; SUBCUTANEOUS at 11:58

## 2018-05-26 RX ADMIN — LORATADINE 10 MG: 10 TABLET ORAL at 08:10

## 2018-05-26 RX ADMIN — POLYETHYLENE GLYCOL 3350 17 G: 17 POWDER, FOR SOLUTION ORAL at 08:09

## 2018-05-26 RX ADMIN — DOCUSATE SODIUM 100 MG: 100 CAPSULE, LIQUID FILLED ORAL at 08:10

## 2018-05-26 RX ADMIN — LEVOTHYROXINE SODIUM 100 MCG: 100 TABLET ORAL at 08:10

## 2018-05-26 RX ADMIN — POTASSIUM CHLORIDE 10 MEQ: 750 TABLET, EXTENDED RELEASE ORAL at 08:10

## 2018-05-26 RX ADMIN — RIVAROXABAN 15 MG: 15 TABLET, FILM COATED ORAL at 08:10

## 2018-05-26 RX ADMIN — Medication 400 MG: at 08:10

## 2018-05-26 NOTE — PROGRESS NOTES
IM Residency Progress Note   Unit/Bed#: [facility] 719-01 Encounter: [id number]  SOD Team C       Jewel Marmolejo [de-identified] y o  female [id number]    Hospital Stay Days: 2      Assessment/Plan:    Principal Problem: Moderate mitral stenosis  Active Problems:    Essential hypertension    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 native heart without angina pectoris    Type 2 diabetes mellitus with complication, with long-term current use of insulin (HCC)    Atrial flutter (HCC)    Hyperlipidemia    Gastroesophageal reflux disease without esophagitis    Asthma    Hx of CABG    Chronic anticoagulation    Chronic combined systolic and diastolic congestive heart failure (HCC)    Postoperative hypothyroidism    Paroxysmal atrial fibrillation (HCC)    Abdominal pain - resolved  -Likely related to hypocalcemia and hypomagnesemia    LEONARD - improving, Cr 0 85 this AM  -Likely iatrogenic  -Continue to hold furosemide and should hold on discharge    QTc prolongation  -Hold and avoid further prolonging agents  -Continue magnesium at discarge    History of NSVT  -Continue amiodarone    Chronic diastolic heart failure s/p AVR and CAD s/p CABG  -Hold furosemide  -Continue BB/ASA/Statin/ACEI    Atrial fibrillation  -Continue Xarelto and BB with rate control strategy  Hx CVA   -Continue Xarleto    DM2  -SSI    COPD - stable without exacerbation  -Continue home medications    Hypothyroidism - stable  -Continue synthroid  PVD  -LE wounds without evidence of cellulitis  -FU with vascular surgery as outpatient  Disposition: Anticipate discharge to home with family support      Subjective:   No acute events overnight  No further epigastric pain or nausea/vomiting    Denies fever/chills, headache, chest pain, shortness of breath       Vitals: Temp (24hrs), Av 6 °F (37 °C), Min:98 4 °F (36 9 °C), Max:98 8 °F (37 1 °C)  Current: Temperature: 98 4 °F (36 9 °C)  Vitals:    18 2315 18 1505 18 2112 18 2300   BP: 113/52 123/50 142/61 128/50   BP Location: Left arm  Left arm Left arm   Pulse: 60 56 56 (!) 54   Resp: 18 18 18   Temp: 98 1 °F (36 7 °C) 98 8 °F (37 1 °C)  98 4 °F (36 9 °C)   TempSrc: Oral   Oral   SpO2: 97% 98%  96%   Weight:       Height:        Body mass index is 28 17 kg/m²  I/O last 24 hours: In: 1845 8 [P O :580; I V :1265 8]  Out: 900 [Urine:900]      Physical Exam:   General: lying in bed, NAD  HEENT: NC/AT, anicteric, MMM  CV: RRR, +s1/s2  Respiratory: CTA B/L without w/r/r  Abdomen: soft, NT ND, normoactive bowel sounds  Extremities: no c/c/e    Invasive Devices     Peripheral Intravenous Line            Peripheral IV 05/23/18 Right Forearm 2 days                   Labs:   Recent Results (from the past 24 hour(s))   Fingerstick Glucose (POCT)    Collection Time: 05/25/18 10:58 AM   Result Value Ref Range    POC Glucose 267 (H) 65 - 140 mg/dl   Fingerstick Glucose (POCT)    Collection Time: 05/25/18  3:45 PM   Result Value Ref Range    POC Glucose 206 (H) 65 - 140 mg/dl   Fingerstick Glucose (POCT)    Collection Time: 05/25/18  9:09 PM   Result Value Ref Range    POC Glucose 142 (H) 65 - 140 mg/dl   Basic metabolic panel    Collection Time: 05/26/18  5:39 AM   Result Value Ref Range    Sodium 140 136 - 145 mmol/L    Potassium 3 7 3 5 - 5 3 mmol/L    Chloride 110 (H) 100 - 108 mmol/L    CO2 24 21 - 32 mmol/L    Anion Gap 6 4 - 13 mmol/L    BUN 11 5 - 25 mg/dL    Creatinine 0 85 0 60 - 1 30 mg/dL    Glucose 69 65 - 140 mg/dL    Calcium 7 1 (L) 8 3 - 10 1 mg/dL    eGFR 65 ml/min/1 73sq m   Magnesium    Collection Time: 05/26/18  5:39 AM   Result Value Ref Range    Magnesium 1 3 (L) 1 6 - 2 6 mg/dL       Radiology Results: I have personally reviewed pertinent reports  Ct Abdomen Pelvis Wo Contrast    Result Date: 5/23/2018  Impression: 1  Diffuse thickening of the gastric wall which may be seen in setting of gastritis   2   Liquid stool in the colon which may be seen in the setting of diarrheal illness  3   Stable bilateral nonobstructing nephrolithiasis   Workstation performed: [id number]       Active Meds:   Current Facility-Administered Medications   Medication Dose Route Frequency    acetaminophen (TYLENOL) tablet 650 mg  650 mg Oral Q6H PRN    albuterol (PROVENTIL HFA,VENTOLIN HFA) inhaler 2 puff  2 puff Inhalation BID PRN    amiodarone tablet 200 mg  200 mg Oral BID    aspirin chewable tablet 81 mg  81 mg Oral Daily    atorvastatin (LIPITOR) tablet 80 mg  80 mg Oral After Dinner    calcium carbonate (TUMS) chewable tablet 1,000 mg  1,000 mg Oral TID    cholecalciferol (VITAMIN D3) tablet 1,000 Units  1,000 Units Oral Daily    docusate sodium (COLACE) capsule 100 mg  100 mg Oral BID    ferrous sulfate tablet 325 mg  325 mg Oral Daily With Breakfast    fluticasone (FLONASE) 50 mcg/act nasal spray 1 spray  1 spray Nasal Daily    fluticasone (FLOVENT HFA) 110 MCG/ACT inhaler 2 puff  2 puff Inhalation Daily    insulin glargine (LANTUS) subcutaneous injection 20 Units 0 2 mL  20 Units Subcutaneous BID    insulin lispro (HumaLOG) 100 units/mL subcutaneous injection 1-5 Units  1-5 Units Subcutaneous TID AC    insulin lispro (HumaLOG) 100 units/mL subcutaneous injection 1-5 Units  1-5 Units Subcutaneous HS    levETIRAcetam (KEPPRA) tablet 750 mg  750 mg Oral Q12H LANCE    levothyroxine tablet 100 mcg  100 mcg Oral Early Morning    lisinopril (ZESTRIL) tablet 2 5 mg  2 5 mg Oral HS    loratadine (CLARITIN) tablet 10 mg  10 mg Oral Daily    magnesium oxide (MAG-OX) tablet 400 mg  400 mg Oral BID    metoprolol tartrate (LOPRESSOR) tablet 100 mg  100 mg Oral Q12H [address]    pantoprazole (PROTONIX) EC tablet 40 mg  40 mg Oral Early Morning    polyethylene glycol (MIRALAX) packet 17 g  17 g Oral Daily    potassium chloride (K-DUR,KLOR-CON) CR tablet 10 mEq  10 mEq Oral BID    rivaroxaban (XARELTO) tablet 15 mg  15 mg Oral Daily    sodium chloride 0 9 % infusion  50 mL/hr Intravenous Continuous         VTE Pharmacologic Prophylaxis: Full anticoagulation  Portions of this note have been copied forward from Dr Linda Suarez  This note reflects current medical care        Simon Griffin DO  PGY-3 IM

## 2018-05-26 NOTE — DISCHARGE SUMMARY
This discharge summary by Dr Johny Pan was incidentally filed under "Incidental Findings," it reflects patient's hospitalization  Per Dr Johny Pan: [facility] Discharge Summary - Medical Martha Li [de-identified] y o  female MRN: [id number]  [address] 7 Room / Bed: [facility] 719/[facility] 539-94 Encounter: [id number]     BRIEF OVERVIEW     Admitting Provider: Glory Rosales MD  Discharge Provider: Glory Rosales MD  Primary Care Physician at Discharge: Anthony Lovell MD     Discharge To: Home     Admission Date: 5/23/2018     Discharge Date: 5/26/2018  3:10 PM     Primary Discharge Diagnosis  Principal Problem:    Vomiting and diarrhea  Active Problems:    Essential hypertension    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 native heart without angina pectoris    Type 2 diabetes mellitus with complication, with long-term current use of insulin (HCC)    Atrial flutter (HCC)    Hyperlipidemia    Gastroesophageal reflux disease without esophagitis    Moderate mitral stenosis    Asthma    Hx of CABG    Chronic anticoagulation    Chronic combined systolic and diastolic congestive heart failure (HCC)    Postoperative hypothyroidism    Paroxysmal atrial fibrillation ([address] )    Seizure ([address] )  Resolved Problems:    * No resolved hospital problems  *           Diagnostic Procedures Performed  Ct Abdomen Pelvis Wo Contrast     Result Date: 5/23/2018  Impression: 1  Diffuse thickening of the gastric wall which may be seen in setting of gastritis  2   Liquid stool in the colon which may be seen in the setting of diarrheal illness  3   Stable bilateral nonobstructing nephrolithiasis   Workstation performed: [id number]         Discharge Disposition: Home/Self Care  Discharged With Lines: no    Test Results Pending at Discharge: None     Outpatient Follow-Up  To be scheduled with PCP      Code Status: Level 1 - Full Code     Medications   See after visit summary for reconciled discharge medications provided to patient and family      Allergies        Allergies   Allergen Reactions    Other Chest Pain       IVP-listed as "chest pain" in previous chart, patient stated this occurred "a long time ago" but does not remember exactly occurred     Cephalosporins Chest Pain    Contrast [Iodinated Diagnostic Agents] Other (See Comments)       Flash pulm edema    Doxycycline Chest Pain    Levaquin [Levofloxacin] Chest Pain    Ondansetron Chest Pain       Prolonged QT    Toradol [Ketorolac Tromethamine] Chest Pain            Discharge Diet: cardiac diet and diabetic diet  Activity restrictions: none     [address] [facility] Course  Patient presented with nausea, vomiting and diarrhea with noted volume contraction and LEONARD as noted in HPI  She was volume expanded with normal saline with improvement overnight of Cr  She had noted decrease in serum magnesium and calcium, which was repleted with IV formulation  She Had her diet advanced from clears to normal diet within 24 hours  Her diarrhea improved and she tolerated further food PO     Upon attempt to discharge back to rehabilitation it was noted that the patient had completed her acute rehabilitation stay  She was therefore held overnight to ensure safe discharge to home environment      Of note, the patient is to hold furosemide but continue to limit salt and fluid intake    She understands this at time of discharge      Presenting Problem/History of Present Illness  Principal Problem:    Vomiting and diarrhea  Active Problems:    Essential hypertension    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 native heart without angina pectoris    Type 2 diabetes mellitus with complication, with long-term current use of insulin (HCC)    Atrial flutter (HCC)    Hyperlipidemia    Gastroesophageal reflux disease without esophagitis    Moderate mitral stenosis    Asthma    Hx of CABG    Chronic anticoagulation Chronic combined systolic and diastolic congestive heart failure (HCC)    Postoperative hypothyroidism    Paroxysmal atrial fibrillation (HCC)    Seizure ([facility] [address] )  Resolved Problems:    * No resolved hospital problems  *           Discharge Condition: fair        Discharge  Statement   I spent 30 minutes minutes discharging the patient  This time was spent on the day of discharge  I had direct contact with the patient on the day of discharge   Additional documentation is required if more than 30 minutes were spent on discharge                 Electronically signed by Inés at 5/28/2018 11:34 AM   Electronically signed by Kathya Ricketts MD at 5/28/2018 12:23 PM

## 2018-05-26 NOTE — PLAN OF CARE
DISCHARGE PLANNING     Discharge to home or other facility with appropriate resources Completed        DISCHARGE PLANNING - CARE MANAGEMENT     Discharge to post-acute care or home with appropriate resources Completed        GASTROINTESTINAL - ADULT     Minimal or absence of nausea and/or vomiting Completed        INFECTION - ADULT     Absence or prevention of progression during hospitalization Completed        Knowledge Deficit     Patient/family/caregiver demonstrates understanding of disease process, treatment plan, medications, and discharge instructions Completed        METABOLIC, FLUID AND ELECTROLYTES - ADULT     Electrolytes maintained within normal limits Completed        Nutrition/Hydration-ADULT     Nutrient/Hydration intake appropriate for improving, restoring or maintaining nutritional needs Completed        PAIN - ADULT     Verbalizes/displays adequate comfort level or baseline comfort level Completed        Potential for Falls     Patient will remain free of falls Completed        Prexisting or High Potential for Compromised Skin Integrity     Skin integrity is maintained or improved Completed        SAFETY ADULT     Patient will remain free of falls Completed        SKIN/TISSUE INTEGRITY - ADULT     Skin integrity remains intact Completed

## 2018-05-29 ENCOUNTER — OFFICE VISIT (OUTPATIENT)
Dept: INTERNAL MEDICINE CLINIC | Facility: CLINIC | Age: 78
End: 2018-05-29
Payer: MEDICARE

## 2018-05-29 VITALS
TEMPERATURE: 98.5 F | WEIGHT: 166.6 LBS | DIASTOLIC BLOOD PRESSURE: 60 MMHG | SYSTOLIC BLOOD PRESSURE: 150 MMHG | HEART RATE: 60 BPM | BODY MASS INDEX: 30.47 KG/M2 | OXYGEN SATURATION: 98 %

## 2018-05-29 DIAGNOSIS — A08.4 VIRAL GASTROENTERITIS: ICD-10-CM

## 2018-05-29 DIAGNOSIS — L97.519 ULCER OF TOE OF RIGHT FOOT, UNSPECIFIED ULCER STAGE (HCC): ICD-10-CM

## 2018-05-29 DIAGNOSIS — K21.9 GASTROESOPHAGEAL REFLUX DISEASE WITHOUT ESOPHAGITIS: ICD-10-CM

## 2018-05-29 DIAGNOSIS — E83.51 HYPOCALCEMIA: Primary | ICD-10-CM

## 2018-05-29 DIAGNOSIS — L97.529 ULCER OF TOE OF LEFT FOOT, UNSPECIFIED ULCER STAGE (HCC): ICD-10-CM

## 2018-05-29 PROCEDURE — 99214 OFFICE O/P EST MOD 30 MIN: CPT | Performed by: INTERNAL MEDICINE

## 2018-05-29 RX ORDER — METOCLOPRAMIDE 10 MG/1
5 TABLET ORAL EVERY 6 HOURS PRN
Qty: 30 TABLET | Refills: 1 | Status: ON HOLD | OUTPATIENT
Start: 2018-05-29 | End: 2018-12-05 | Stop reason: ALTCHOICE

## 2018-05-29 NOTE — PROGRESS NOTES
Assessment/Plan:     Diagnoses and all orders for this visit:    Hypocalcemia  -     Comprehensive metabolic panel; Future    Viral gastroenteritis  -     metoclopramide (REGLAN) 10 mg tablet; Take 0 5 tablets (5 mg total) by mouth every 6 (six) hours as needed (Nausea and vomiting)  -     Comprehensive metabolic panel; Future  -     CBC and Platelet; Future    Ulcer of toe of left foot, unspecified ulcer stage ([facility] )  -     Ambulatory referral to Podiatry; Future    Ulcer of toe of right foot, unspecified ulcer stage ([facility] [address] )  -     Ambulatory referral to Podiatry; Future    Gastroesophageal reflux disease without esophagitis        Viral gastroenteritis  - Resolved  - Pt is symptom free and tolerating meals  - We will refill her prn  Reglan  - She has been counseled to maintain her PO intake  - We will check a CBC in 2 weeks  Hypocalcemia  - Corrected calcium on 5/25/18 was 7 6   - Pt has been counseled to start taking her calcium supplements and vit D supplements  - We will repeat a CMP in two weeks and see her in two weeks  Bilateral foot ulcers  - Ulcers are not wet or discharging  They appear to be healing   - Pt has an appointment to see a vascular surgeon on 7/5/18  - We will refer her to podiatry  GERD  - Pt has been counseled to take her antacids as prescribed  - She has been counseled to get a different formulation over the counter if she is not tolerating her current one  Subjective:      Patient ID: Emerald Bryant is a [de-identified] y o  female  Patient is  Here for a follow-up visit  She was recently admitted at [facility] with nausea, vomiting and diarrhea from 05/24/ 18 to 5/20 6/18  Patient states she feels much improved now  She denies nausea, vomiting, abdominal pain, diarrhea, fever, chest pain, cough, headache, dizziness  She has been eating very well and tolerating her solid meals   She admits to occasional chills which is chronic and complains of pain in her bilateral feet when she walks that has been going on for long time  She would like to know if she should continue taking her potassium tablets and also wants to know if she should continue taking her calcium tablets  She has not been taking her calcium tablets because she did not like the way they made her feel when she took them in her nursing home  HPI    The following portions of the patient's history were reviewed and updated as appropriate: allergies, current medications, past family history, past medical history, past social history, past surgical history and problem list     Review of Systems   Constitutional: Positive for chills  Negative for activity change, fatigue, fever and unexpected weight change  HENT: Negative for ear pain, postnasal drip, rhinorrhea, sinus pressure and sore throat  Eyes: Negative for pain  Respiratory: Negative for cough, choking, chest tightness, shortness of breath and wheezing  Cardiovascular: Negative for chest pain, palpitations and leg swelling  Gastrointestinal: Negative for abdominal pain, constipation, diarrhea, nausea and vomiting  Genitourinary: Negative for dysuria and hematuria  Musculoskeletal: Positive for gait problem  Negative for arthralgias, back pain, joint swelling, myalgias and neck stiffness  Patient admits to pain in her bilateral feet when she walks  Pain is in the area of her  Foot ulcers  Skin: Positive for wound  Negative for pallor and rash  Bilateral foot ulcers, multiple and healing  Neurological: Negative for dizziness, tremors, seizures, syncope, light-headedness and headaches  Hematological: Negative for adenopathy  Psychiatric/Behavioral: Negative for behavioral problems           Past Medical History:   Diagnosis Date    Altered mental status     Anticoagulated     Coumadin for Aflutter    Asthma     Atrial flutter ([facility] [address] )     maintained on coumadin anticoag    CAD (coronary artery disease)     Candidiasis     Carotid stenosis, bilateral     Cataract     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) [facility]     Chronic fatigue syndrome     Chronic low back pain     Concussion w loss of consciousness of unsp duration, init     Coronary artery disease     CVA [facility]cerebral vascular accident) ([facility] ) 4/17/2018    Diabetes mellitus ([facility] )     type 2, insulin dependent    DJD (degenerative joint disease)     GERD (gastroesophageal reflux disease)     Herpes zoster     HLD (hyperlipidemia)     HTN (hypertension)     Hyperlipidemia     Hypothyroidism     Irritable bowel syndrome     Lumbar radiculopathy     Lyme disease     Dx in hospital 8/2011    Lyme disease     [facility]     Migraines     Muscle spasm     Non-neoplastic nevus     Nontoxic multinodular goiter     OA (osteoarthritis)     Osteoarthritis     Other chronic pain     PAD (peripheral artery disease) [facility]     Palpitations     Pseudogout     Spinal stenosis     Transient cerebral ischemia     Trigger ring finger     Viral gastroenteritis          Current Outpatient Prescriptions:     acetaminophen (TYLENOL) 650 mg CR tablet, Take 1 tablet (650 mg total) by mouth every 8 (eight) hours as needed for mild pain Do not take in conjunction with Percocet , Disp: 90 tablet, Rfl: 0    albuterol (VENTOLIN HFA) 90 mcg/act inhaler, Inhale 108 mcg 2 (two) times a day as needed 2 puffs bid PRN, Disp: , Rfl:     amiodarone 200 mg tablet, Take 1 tablet (200 mg total) by mouth 2 (two) times a day (Patient taking differently: Take 200 mg by mouth daily  ), Disp: 30 tablet, Rfl: 0    aspirin 81 MG tablet, Take 81 mg by mouth daily, Disp: , Rfl:     atorvastatin (LIPITOR) 80 mg tablet, Take 1 tablet (80 mg total) by mouth daily, Disp: 30 tablet, Rfl: 2    calcium carbonate (TUMS) 500 mg chewable tablet, Chew 2 tablets 3 (three) times a day  , Disp: , Rfl:     cholecalciferol (VITAMIN D3) 1,000 units tablet, Take 1,000 Units by mouth daily  , Disp: , Rfl:     docusate sodium (COLACE) 100 mg capsule, Take 1 capsule (100 mg total) by mouth 2 (two) times a day (Patient taking differently: Take 100 mg by mouth 2 (two) times a day as needed (as needed)  ), Disp: 10 capsule, Rfl: 0    ferrous sulfate 325 (65 Fe) mg tablet, Take 325 mg by mouth daily with breakfast, Disp: , Rfl:     fluticasone (FLONASE) 50 mcg/act nasal spray, 1 spray into each nostril daily, Disp: , Rfl:     glucose blood test strip, 1 each by Other route 4 (four) times a day (before meals and at bedtime) Use as instructed, Disp: , Rfl:     LANTUS 100 UNIT/ML subcutaneous injection, Inject 20 Units under the skin 2 (two) times a day, Disp: 10 mL, Rfl: 0    levETIRAcetam (KEPPRA) 750 mg tablet, Take 1 tablet (750 mg total) by mouth every 12 (twelve) hours, Disp: 60 tablet, Rfl: 0    levothyroxine 100 mcg tablet, Take 100 mcg by mouth daily, Disp: , Rfl:     lisinopril (ZESTRIL) 5 mg tablet, Take 2 5 mg by mouth daily at bedtime  , Disp: , Rfl:     loratadine (CLARITIN) 10 mg tablet, Take 1 tablet (10 mg total) by mouth daily, Disp: 30 tablet, Rfl: 0    magnesium oxide (MAG-OX) 400 mg, Take 1 tablet (400 mg total) by mouth 2 (two) times a day, Disp: 60 tablet, Rfl: 0    metFORMIN (GLUCOPHAGE) 1000 MG tablet, 1,000 mg 2 (two) times a day, Disp: , Rfl:     metoclopramide (REGLAN) 10 mg tablet, Take 0 5 tablets (5 mg total) by mouth every 6 (six) hours as needed (Nausea and vomiting), Disp: 30 tablet, Rfl: 1    metoprolol tartrate (LOPRESSOR) 100 mg tablet, Take 1 tablet (100 mg total) by mouth every 12 (twelve) hours, Disp: , Rfl: 0    Mometasone Furoate (ASMANEX HFA) 100 MCG/ACT AERO, Inhale 2 puffs once daily, Disp: 1 Inhaler, Rfl: 5    nitroglycerin (NITROSTAT) 0 4 mg SL tablet, Place 0 4 mg under the tongue every 3 (three) minutes as needed, Disp: , Rfl:     pantoprazole (PROTONIX) 40 mg tablet, Take 40 mg by mouth daily, Disp: , Rfl:     polyethylene glycol (MIRALAX) 17 g packet, Take 17 g by mouth daily, Disp: , Rfl:     rivaroxaban (XARELTO) 20 mg tablet, Take 1 tablet (20 mg total) by mouth daily, Disp: , Rfl: 0    Allergies   Allergen Reactions    Other Chest Pain     IVP-listed as "chest pain" in previous chart, patient stated this occurred "a long time ago" but does not remember exactly occurred     Cephalosporins Chest Pain    Contrast [Iodinated Diagnostic Agents] Other (See Comments)     Flash pulm edema    Doxycycline Chest Pain    Levaquin [Levofloxacin] Chest Pain    Ondansetron Chest Pain     Prolonged QT    Toradol [Ketorolac Tromethamine] Chest Pain       Social History   Past Surgical History:   Procedure Laterality Date    APPENDECTOMY      ARTERIOGRAM  12/19/2017    CARDIAC CATHETERIZATION      CHOLECYSTECTOMY      COLONOSCOPY      CORONARY ARTERY BYPASS GRAFT  2004    LUMBAR LAMINECTOMY      NV ECHO TRANSESOPHAG R-T 2D W/PRB IMG ACQUISJ I&R N/A 4/3/2018    Procedure: TRANSESOPHAGEAL ECHOCARDIOGRAM (ROBB);   Surgeon: Avery Romero DO;  [facility]: [facility];  Service: Cardiac Surgery    NV REPLACE AORTIC VALVE OPENFEMORAL ARTERY APPROACH N/A 4/3/2018    Procedure: REPLACEMENT AORTIC VALVE TRANSCATHETER (TAVR) TRANSFEMORAL w/ 26MM IVY YEVGENIY S3 VALVE;  Surgeon: Avery Romero DO;  Location: [facility];  Service: Cardiac Surgery    THYROIDECTOMY      TOTAL ABDOMINAL HYSTERECTOMY W/ BILATERAL SALPINGOOPHORECTOMY       Family History   Problem Relation Age of Onset    Cancer Mother     Stroke Mother     Cancer Father     Heart disease Father     Breast cancer Sister     Diabetes Daughter      Passed away January 2017        Objective:  /60 (BP Location: Right arm, Patient Position: Sitting, Cuff Size: Standard)   Pulse 60   Temp 98 5 °F (36 9 °C) (Oral)   Wt 75 6 kg (166 lb 9 6 oz)   LMP  (LMP Unknown)   SpO2 98%   BMI 30 47 kg/m²        Physical Exam   Constitutional: She is oriented to person, place, and time  She appears well-developed and well-nourished  No distress  HENT:   Head: Normocephalic and atraumatic  Right Ear: External ear normal    Left Ear: External ear normal    Nose: Nose normal    Mouth/Throat: Oropharynx is clear and moist  No oropharyngeal exudate  Eyes: Conjunctivae and EOM are normal  Pupils are equal, round, and reactive to light  Right eye exhibits no discharge  Left eye exhibits no discharge  No scleral icterus  Pale   Neck: Normal range of motion  Neck supple  No JVD present  No tracheal deviation present  No thyromegaly present  Cardiovascular: Normal rate, regular rhythm and intact distal pulses  Exam reveals no gallop and no friction rub  Murmur heard  2/6 systolic murmur maximal in aortic valve region and radiating to the bilateral carotids  Pulmonary/Chest: Effort normal  No respiratory distress  She has no wheezes  She has rales  She exhibits no tenderness  Bilateral crackles in lower lung zones  Abdominal: Soft  Bowel sounds are normal  She exhibits no distension and no mass  There is tenderness  There is no rebound and no guarding  Mild epigastric tenderness  Musculoskeletal: Normal range of motion  She exhibits edema  She exhibits no tenderness or deformity  1+ pitting pedal edema extending up to the mid shins bilaterally, left worse  Than right  Lymphadenopathy:     She has cervical adenopathy  Neurological: She is alert and oriented to person, place, and time  She has normal reflexes  She exhibits normal muscle tone  Coordination normal    Sensation is decreased on the  Right lower extremity compared to the left  Skin: Skin is warm and dry  No rash noted  She is not diaphoretic  There is erythema  No pallor  Erythema of the forefoot bilaterally  Patient has healing ulcers with black eshcar on multiple areas of her bilateral feet and toes  No discharge from any of the ulcers - all dry and healing  Psychiatric: She has a normal mood and affect   Her behavior is normal

## 2018-05-30 ENCOUNTER — OFFICE VISIT (OUTPATIENT)
Dept: CARDIOLOGY CLINIC | Facility: CLINIC | Age: 78
End: 2018-05-30
Payer: MEDICARE

## 2018-05-30 ENCOUNTER — TELEPHONE (OUTPATIENT)
Dept: INTERNAL MEDICINE CLINIC | Facility: CLINIC | Age: 78
End: 2018-05-30

## 2018-05-30 VITALS
HEART RATE: 78 BPM | BODY MASS INDEX: 30.55 KG/M2 | WEIGHT: 166 LBS | HEIGHT: 62 IN | DIASTOLIC BLOOD PRESSURE: 84 MMHG | SYSTOLIC BLOOD PRESSURE: 166 MMHG

## 2018-05-30 DIAGNOSIS — I05.0 MODERATE MITRAL STENOSIS: ICD-10-CM

## 2018-05-30 DIAGNOSIS — I10 ESSENTIAL HYPERTENSION: ICD-10-CM

## 2018-05-30 DIAGNOSIS — I48.0 PAROXYSMAL ATRIAL FIBRILLATION (HCC): ICD-10-CM

## 2018-05-30 DIAGNOSIS — I25.10 CORONARY ARTERY DISEASE INVOLVING NATIVE CORONARY ARTERY OF NATIVE HEART WITHOUT ANGINA PECTORIS: ICD-10-CM

## 2018-05-30 DIAGNOSIS — I27.20 PULMONARY HYPERTENSION (HCC): ICD-10-CM

## 2018-05-30 DIAGNOSIS — I50.22 CHRONIC SYSTOLIC HEART FAILURE (HCC): Primary | ICD-10-CM

## 2018-05-30 PROCEDURE — 99214 OFFICE O/P EST MOD 30 MIN: CPT | Performed by: INTERNAL MEDICINE

## 2018-05-30 RX ORDER — FUROSEMIDE 40 MG/1
40 TABLET ORAL DAILY
Qty: 90 TABLET | Refills: 3 | Status: ON HOLD | OUTPATIENT
Start: 2018-05-30 | End: 2018-12-05 | Stop reason: ALTCHOICE

## 2018-05-30 RX ORDER — LISINOPRIL 5 MG/1
5 TABLET ORAL DAILY
Qty: 90 TABLET | Refills: 3 | Status: ON HOLD | OUTPATIENT
Start: 2018-05-30 | End: 2018-08-24

## 2018-05-30 NOTE — PROGRESS NOTES
Cardiology Follow-up    Isamar العلي  [id number]  1940  [facility]ART [facility]ASC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 at which time she was switched from Coumadin to Xarelto  1  Stage C- HFrEF- Biventricular failure with LVEF-35%, LVIDd-5 4cm- NYHA Class II/III symptoms  - Patient with worsened LVEF post TAVR when she was admitted for AMS complicated with NSVT, flash pulmonary edema and stroke presumed embolic  - Cardiac cath in March 2018 showed 100% stenosis of the SVG to OM1/RPDA and the reduced LV function is mostly due to stress from other comorbidities with underlying CAD   - clinically slightly hypervolemic and warm  - Restart lasix 40mg daily   - Advised to monitor weight at home, fluid restriction to 1800ml and Na restriction to 2gm  - GDMT with lisinopril-- increase to 5mg daily given elevated BP in the office today, continue Metoprolol tartrate 100mg twice daily   - Repeat BMP in one month  - Will repeat echo in three months to evaluate EF and need for device therapy  - Echo-5/1/18 reviewed- LVEF- severely reduced at 35% with moderate diffuse hypokinesis with distinct severe hypokinesis of the inferior and inferolateral walls, dilated RV with reduced function  Moderate MS  Moderate TR with a PASP of 70     2  Severe pulmonary hypertension with estimated PASP of 70-5/1/18 with reduced RV function- mostly WHO group II  - Plan for diuresis as above      3  CAD s/p CABGx4 in 2004  - Cardiac catheterization 3/18 showed patent LIMA to LAD, SVG to D1 with 50% stenosis, Y graft to D2,and D3, SVG to OM1 was 100% stenosed, SVG to RPDA was 100% stenosed  - on ASA 81mg daily, Atorvastatin 80mg daily   - Lipid panel-4/18- LDL-46, HDL-35    4  Valvular heart disease- severe AS s/p TAVR with 26mm Pavon Papa 3 in April 2018, moderate MS from [facility]  - Echo-5/1/18- with no increased gradients or perivalvular regurgitation   - Mean gradient of 10 across the mitral valve  5  NSVT due to electrolyte abnormalities  - On Amiodarone 200mg daily  Will defer PFT to a later visit  - TSH-4/29/18-1 94, stable LFTs in May 2018  6  Paroxysmal atrial fibrillation on anticoagulation with Xarelto  - On metoprolol tartrate 100mg twice daily  - Xarelto for stroke prevention  7  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- patient with significant atherosclerotic disease and severe MAC, less likely Atrial fibrillation    8  Type II Diabetes    F/u in three months  History of Present Illness:  Ms Moore is a [de-identified]year old women with past medical history significant for CAD s/p CABGx4 in 2004, Heart failure with reduced ejection fraction-35%-5/18- post TAVR, Atrial fibrillation, severe aortic stenosis s/p TAVR- 26mm Edward Celine 3 on 4/3, Hypertension, Dyslipidemia, Peripheral vascular disease, Degenerative joint disease, Hypothyroidism after thyroid surgery, GERD, Type II diabetes with retinopathy, 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 at which time she was switched from Coumadin to Xarelto  Patient had an admission from 4/28-5/7 for altered mental status and GTCS in the hospital with EEG showing no epileptiform focus  Subsequently had pulmonary edema requiring BiPAP, had multiple episodes of NSVT with K-3 3, Mg was also low for which she was started on amiodarone with 200mg BID for 14days and then 200mg daily, CVA presumed cardioembolic and Coumadin was changed to Xarelto    She then had an admission from 5/23-5/26 for nausea, vomiting and diarrhea with LEONARD which improved with IV hydration  Patient is home now  Reports no chest pain or pressure  No shortness of breath at rest or with minimal exertion  Has lower leg swelling  Reports fatigue  Been compliant with her medications  Has good family support      Patient Active Problem List   Diagnosis    Essential hypertension    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 native heart without angina pectoris    Type 2 diabetes mellitus with complication, with long-term current use of insulin (HCC)    Atrial flutter (HCC)    Hyperlipidemia    Gastroesophageal reflux disease without esophagitis    Moderate mitral stenosis    Arthritis of hand    Acute respiratory failure with hypoxia ([facility] )    Asthma    Asymptomatic carotid artery stenosis, bilateral    Atherosclerosis of artery of extremity with ulceration ([facility] )    Hx of CABG    Chronic anticoagulation    Chronic combined systolic and diastolic congestive heart failure (HCC)    Degenerative joint disease involving multiple joints    Diabetic retinopathy (HCC)    Postoperative hypothyroidism    Migraine headache    Nephrolithiasis    Osteoarthritis of both knees    Paroxysmal atrial fibrillation (HCC)    Ulcer of toe of left foot (HCC)    Ulcer of toe of right foot (HCC)    Prolonged Q-T interval on ECG    Hypokalemia    Hypocalcemia    Left bundle branch block (LBBB)    1st degree AV block    S/P TAVR (transcatheter aortic valve replacement)    Expressive aphasia    Multiple lacunar infarcts (HCC)    Vomiting    History of CVA (cerebrovascular accident)    Supratherapeutic INR    NSVT (nonsustained ventricular tachycardia) (HCC)    Pulmonary hypertension (HCC)    Anemia    Severe sepsis (HCC)    Lactic acidosis    Hypomagnesemia     Past Medical History:   Diagnosis Date    Altered mental status     Anticoagulated     Coumadin for Aflutter    Asthma     Atrial flutter ([facility] ) maintained on coumadin anticoag    CAD (coronary artery disease)     Candidiasis     Carotid stenosis, bilateral     Cataract     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) ([facility])     Chronic fatigue syndrome     Chronic low back pain     Concussion w loss of consciousness of unsp duration, init     Coronary artery disease     CVA (cerebral vascular accident) ([facility] ) 4/17/2018    Diabetes mellitus ([facility] )     type 2, insulin dependent    DJD (degenerative joint disease)     GERD (gastroesophageal reflux disease)     Herpes zoster     HLD (hyperlipidemia)     HTN (hypertension)     Hyperlipidemia     Hypothyroidism     Irritable bowel syndrome     Lumbar radiculopathy     Lyme disease     Dx in hospital 8/2011    Lyme disease     [facility]     Migraines     Muscle spasm     Non-neoplastic nevus     Nontoxic multinodular goiter     OA (osteoarthritis)     Osteoarthritis     Other chronic pain     PAD (peripheral artery disease) ([facility])     Palpitations     Pseudogout     Spinal stenosis     Transient cerebral ischemia     Trigger ring finger     Viral gastroenteritis      Social History     Social History    Marital status:      Spouse name: N/A    Number of children: N/A    Years of education: N/A     Occupational History    Not on file       Social History Main Topics    Smoking status: Never Smoker    Smokeless tobacco: Never Used    Alcohol use No    Drug use: No    Sexual activity: Not on file     Other Topics Concern    Not on file     Social History Narrative    No narrative on file      Family History   Problem Relation Age of Onset    Cancer Mother     Stroke Mother     Cancer Father     Heart disease Father     Breast cancer Sister     Diabetes Daughter      Passed away January 2017      Past Surgical History:   Procedure Laterality Date    APPENDECTOMY      ARTERIOGRAM  12/19/2017   Jose Elias Sorensen CARDIAC CATHETERIZATION      CHOLECYSTECTOMY      COLONOSCOPY      CORONARY ARTERY BYPASS GRAFT  2004    LUMBAR LAMINECTOMY      IL ECHO TRANSESOPHAG R-T 2D W/PRB IMG ACQUISJ I&R N/A 4/3/2018    Procedure: TRANSESOPHAGEAL ECHOCARDIOGRAM (ROBB);   Surgeon: Inna Matute DO;  Location[facility];  Service: Cardiac Surgery    IL REPLACE AORTIC VALVE OPENFEMORAL ARTERY APPROACH N/A 4/3/2018    Procedure: REPLACEMENT AORTIC VALVE TRANSCATHETER (TAVR) TRANSFEMORAL w/ 26MM IVY YEVGENIY S3 VALVE;  Surgeon: Inna Matute DO;  [facility];  Service: Cardiac Surgery    THYROIDECTOMY      TOTAL ABDOMINAL HYSTERECTOMY W/ BILATERAL SALPINGOOPHORECTOMY         Current Outpatient Prescriptions:     acetaminophen (TYLENOL) 650 mg CR tablet, Take 1 tablet (650 mg total) by mouth every 8 (eight) hours as needed for mild pain Do not take in conjunction with Percocet , Disp: 90 tablet, Rfl: 0    albuterol (VENTOLIN HFA) 90 mcg/act inhaler, Inhale 108 mcg 2 (two) times a day as needed 2 puffs bid PRN, Disp: , Rfl:     amiodarone 200 mg tablet, Take 1 tablet (200 mg total) by mouth 2 (two) times a day (Patient taking differently: Take 200 mg by mouth daily  ), Disp: 30 tablet, Rfl: 0    aspirin 81 MG tablet, Take 81 mg by mouth daily, Disp: , Rfl:     atorvastatin (LIPITOR) 80 mg tablet, Take 1 tablet (80 mg total) by mouth daily, Disp: 30 tablet, Rfl: 2    calcium carbonate (TUMS) 500 mg chewable tablet, Chew 2 tablets 3 (three) times a day  , Disp: , Rfl:     cholecalciferol (VITAMIN D3) 1,000 units tablet, Take 1,000 Units by mouth daily  , Disp: , Rfl:     docusate sodium (COLACE) 100 mg capsule, Take 1 capsule (100 mg total) by mouth 2 (two) times a day (Patient taking differently: Take 100 mg by mouth 2 (two) times a day as needed (as needed)  ), Disp: 10 capsule, Rfl: 0    ferrous sulfate 325 (65 Fe) mg tablet, Take 325 mg by mouth daily with breakfast, Disp: , Rfl:     fluticasone (FLONASE) 50 mcg/act nasal spray, 1 spray into each nostril daily, Disp: , Rfl:     glucose blood test strip, 1 each by Other route 4 (four) times a day (before meals and at bedtime) Use as instructed, Disp: , Rfl:     LANTUS 100 UNIT/ML subcutaneous injection, Inject 20 Units under the skin 2 (two) times a day, Disp: 10 mL, Rfl: 0    levETIRAcetam (KEPPRA) 750 mg tablet, Take 1 tablet (750 mg total) by mouth every 12 (twelve) hours, Disp: 60 tablet, Rfl: 0    levothyroxine 100 mcg tablet, Take 100 mcg by mouth daily, Disp: , Rfl:     lisinopril (ZESTRIL) 5 mg tablet, Take 2 5 mg by mouth daily at bedtime  , Disp: , Rfl:     loratadine (CLARITIN) 10 mg tablet, Take 1 tablet (10 mg total) by mouth daily, Disp: 30 tablet, Rfl: 0    magnesium oxide (MAG-OX) 400 mg, Take 1 tablet (400 mg total) by mouth 2 (two) times a day, Disp: 60 tablet, Rfl: 0    metFORMIN (GLUCOPHAGE) 1000 MG tablet, 1,000 mg 2 (two) times a day, Disp: , Rfl:     metoclopramide (REGLAN) 10 mg tablet, Take 0 5 tablets (5 mg total) by mouth every 6 (six) hours as needed (Nausea and vomiting), Disp: 30 tablet, Rfl: 1    metoprolol tartrate (LOPRESSOR) 100 mg tablet, Take 1 tablet (100 mg total) by mouth every 12 (twelve) hours, Disp: , Rfl: 0    Mometasone Furoate (ASMANEX HFA) 100 MCG/ACT AERO, Inhale 2 puffs once daily, Disp: 1 Inhaler, Rfl: 5    nitroglycerin (NITROSTAT) 0 4 mg SL tablet, Place 0 4 mg under the tongue every 3 (three) minutes as needed, Disp: , Rfl:     pantoprazole (PROTONIX) 40 mg tablet, Take 40 mg by mouth daily, Disp: , Rfl:     polyethylene glycol (MIRALAX) 17 g packet, Take 17 g by mouth daily, Disp: , Rfl:     rivaroxaban (XARELTO) 20 mg tablet, Take 1 tablet (20 mg total) by mouth daily, Disp: , Rfl: 0  Allergies   Allergen Reactions    Other Chest Pain     IVP-listed as "chest pain" in previous chart, patient stated this occurred "a long time ago" but does not remember exactly occurred     Cephalosporins Chest Pain    Contrast [Iodinated Diagnostic Agents] Other (See Comments)     Flash pulm edema    Doxycycline Chest Pain    Levaquin [Levofloxacin] Chest Pain    Ondansetron Chest Pain     Prolonged QT    Toradol [Ketorolac Tromethamine] Chest Pain         Labs:  Admission on 05/23/2018, Discharged on 05/26/2018   Component Date Value    WBC 05/23/2018 10 92*    RBC 05/23/2018 4 78     Hemoglobin 05/23/2018 10 3*    Hematocrit 05/23/2018 33 6*    MCV 05/23/2018 70*    MCH 05/23/2018 21 5*    MCHC 05/23/2018 30 7*    RDW 05/23/2018 21 4*    MPV 05/23/2018 10 4     Platelets 31/73/4138 251     nRBC 05/23/2018 0     Neutrophils Relative 05/23/2018 68     Lymphocytes Relative 05/23/2018 21     Monocytes Relative 05/23/2018 8     Eosinophils Relative 05/23/2018 2     Basophils Relative 05/23/2018 0     Neutrophils Absolute 05/23/2018 7 40     Lymphocytes Absolute 05/23/2018 2 34     Monocytes Absolute 05/23/2018 0 87     Eosinophils Absolute 05/23/2018 0 23     Basophils Absolute 05/23/2018 0 04     Sodium 05/23/2018 136     Potassium 05/23/2018 3 8     Chloride 05/23/2018 102     CO2 05/23/2018 24     Anion Gap 05/23/2018 10     BUN 05/23/2018 25     Creatinine 05/23/2018 1 42*    Glucose 05/23/2018 114     Calcium 05/23/2018 6 7*    AST 05/23/2018 25     ALT 05/23/2018 19     Alkaline Phosphatase 05/23/2018 55     Total Protein 05/23/2018 7 9     Albumin 05/23/2018 3 4*    Total Bilirubin 05/23/2018 0 64     eGFR 05/23/2018 35     Lipase 05/23/2018 321     LACTIC ACID 05/23/2018 2 1*    Troponin I 05/23/2018 <0 02     LACTIC ACID 05/24/2018 1 6     Sodium 05/24/2018 140     Potassium 05/24/2018 3 6     Chloride 05/24/2018 105     CO2 05/24/2018 25     Anion Gap 05/24/2018 10     BUN 05/24/2018 23     Creatinine 05/24/2018 1 26     Glucose 05/24/2018 84     Calcium 05/24/2018 6 3*    AST 05/24/2018 21     ALT 05/24/2018 15     Alkaline Phosphatase 05/24/2018 46     Total Protein 05/24/2018 6 6     Albumin 05/24/2018 3 0*    Total Bilirubin 05/24/2018 0 59     eGFR 05/24/2018 40     Magnesium 05/24/2018 0 5*    Phosphorus 05/24/2018 4 9*    POC Glucose 05/24/2018 98     POC Glucose 05/24/2018 100     POC Glucose 05/24/2018 205*    POC Glucose 05/24/2018 202*    Sodium 05/25/2018 141     Potassium 05/25/2018 3 5     Chloride 05/25/2018 108     CO2 05/25/2018 25     Anion Gap 05/25/2018 8     BUN 05/25/2018 15     Creatinine 05/25/2018 0 98     Glucose 05/25/2018 80     Calcium 05/25/2018 6 8*    AST 05/25/2018 19     ALT 05/25/2018 14     Alkaline Phosphatase 05/25/2018 45*    Total Protein 05/25/2018 6 2*    Albumin 05/25/2018 2 8*    Total Bilirubin 05/25/2018 0 44     eGFR 05/25/2018 55     Magnesium 05/25/2018 1 6     POC Glucose 05/25/2018 79     POC Glucose 05/25/2018 267*    POC Glucose 05/25/2018 206*    POC Glucose 05/25/2018 142*    Sodium 05/26/2018 140     Potassium 05/26/2018 3 7     Chloride 05/26/2018 110*    CO2 05/26/2018 24     Anion Gap 05/26/2018 6     BUN 05/26/2018 11     Creatinine 05/26/2018 0 85     Glucose 05/26/2018 69     Calcium 05/26/2018 7 1*    eGFR 05/26/2018 65     Magnesium 05/26/2018 1 3*    POC Glucose 05/26/2018 77     POC Glucose 05/26/2018 245*   Admission on 05/18/2018, Discharged on 05/18/2018   Component Date Value    WBC 05/18/2018 9 31     RBC 05/18/2018 4 79     Hemoglobin 05/18/2018 10 1*    Hematocrit 05/18/2018 33 9*    MCV 05/18/2018 71*    MCH 05/18/2018 21 1*    MCHC 05/18/2018 29 8*    RDW 05/18/2018 21 0*    Platelets 05/65/3612 218     nRBC 05/18/2018 0     Neutrophils Relative 05/18/2018 73     Lymphocytes Relative 05/18/2018 18     Monocytes Relative 05/18/2018 6     Eosinophils Relative 05/18/2018 3     Basophils Relative 05/18/2018 0     Neutrophils Absolute 05/18/2018 6 80     Lymphocytes Absolute 05/18/2018 1 63     Monocytes Absolute 05/18/2018 0 54     Eosinophils Absolute 05/18/2018 0 30     Basophils Absolute 05/18/2018 0 03     Sodium 05/18/2018 135*    Potassium 05/18/2018 4 9     Chloride 05/18/2018 102     CO2 05/18/2018 24     Anion Gap 05/18/2018 9     BUN 05/18/2018 40*    Creatinine 05/18/2018 1 89*    Glucose 05/18/2018 187*    Calcium 05/18/2018 6 8*    AST 05/18/2018 46*    ALT 05/18/2018 20     Alkaline Phosphatase 05/18/2018 59     Total Protein 05/18/2018 7 3     Albumin 05/18/2018 3 2*    Total Bilirubin 05/18/2018 0 49     eGFR 05/18/2018 25     Lipase 05/18/2018 339     Extra Tube 05/18/2018 Hold for add-ons   Troponin I 05/18/2018 0 02     Color, UA 05/18/2018 Yellow     Clarity, UA 05/18/2018 Clear     pH, UA 05/18/2018 5 0     Leukocytes, UA 05/18/2018 Small*    Nitrite, UA 05/18/2018 Negative     Protein, UA 05/18/2018 Negative     Glucose, UA 05/18/2018 Negative     Ketones, UA 05/18/2018 Negative     Urobilinogen, UA 05/18/2018 0 2     Bilirubin, UA 05/18/2018 Negative     Blood, UA 05/18/2018 Negative     Specific Gravity, UA 05/18/2018 1 010     RBC, UA 05/18/2018 None Seen     WBC, UA 05/18/2018 2-4*    Epithelial Cells 05/18/2018 None Seen     Bacteria, UA 05/18/2018 None Seen     Hyaline Casts, UA 05/18/2018 3-5*    Sodium 05/18/2018 138     Potassium 05/18/2018 4 4     Chloride 05/18/2018 106     CO2 05/18/2018 25     Anion Gap 05/18/2018 7     BUN 05/18/2018 38*    Creatinine 05/18/2018 1 65*    Glucose 05/18/2018 109     Calcium 05/18/2018 6 6*    eGFR 05/18/2018 29     Ventricular Rate 05/18/2018 68     Atrial Rate 05/18/2018 68     AL Interval 05/18/2018 320     QRSD Interval 05/18/2018 166     QT Interval 05/18/2018 508     QTC Interval 05/18/2018 540     P Axis 05/18/2018 79     QRS Axis 05/18/2018 -68     T Wave Axis 05/18/2018 104    Admission on 04/28/2018, Discharged on 05/07/2018   No results displayed because visit has over 200 results        Admission on 04/28/2018, Discharged on 04/28/2018   Component Date Value    WBC 04/28/2018 9 30     RBC 04/28/2018 4 56     Hemoglobin 04/28/2018 9 5*    Hematocrit 04/28/2018 30 7*    MCV 04/28/2018 67*    MCH 04/28/2018 20 8*    MCHC 04/28/2018 30 9*    RDW 04/28/2018 18 8*    MPV 04/28/2018 10 7     Platelets 65/11/9286 315     nRBC 04/28/2018 0     Neutrophils Relative 04/28/2018 71     Lymphocytes Relative 04/28/2018 21     Monocytes Relative 04/28/2018 6     Eosinophils Relative 04/28/2018 2     Basophils Relative 04/28/2018 0     Neutrophils Absolute 04/28/2018 6 59     Lymphocytes Absolute 04/28/2018 1 93     Monocytes Absolute 04/28/2018 0 59     Eosinophils Absolute 04/28/2018 0 14     Basophils Absolute 04/28/2018 0 03     Sodium 04/28/2018 141     Potassium 04/28/2018 3 1*    Chloride 04/28/2018 107     CO2 04/28/2018 21     Anion Gap 04/28/2018 13     BUN 04/28/2018 9     Creatinine 04/28/2018 0 70     Glucose 04/28/2018 107     Calcium 04/28/2018 6 2*    AST 04/28/2018 33     ALT 04/28/2018 19     Alkaline Phosphatase 04/28/2018 74     Total Protein 04/28/2018 7 6     Albumin 04/28/2018 2 9*    Total Bilirubin 04/28/2018 0 67     eGFR 04/28/2018 82     Protime 04/28/2018 40 0*    INR 04/28/2018 4 04*    PTT 04/28/2018 50*    Lipase 04/28/2018 139     Troponin I 04/28/2018 0 03     Color, UA 04/28/2018 yellow     TSH 3RD GENERATON 04/28/2018 1 650     Color, UA 04/28/2018 Yellow     Clarity, UA 04/28/2018 Clear     pH, UA 04/28/2018 5 5     Leukocytes, UA 04/28/2018 Negative     Nitrite, UA 04/28/2018 Negative     Protein, UA 04/28/2018 30 (1+)*    Glucose, UA 04/28/2018 Negative     Ketones, UA 04/28/2018 Negative     Urobilinogen, UA 04/28/2018 0 2     Bilirubin, UA 04/28/2018 Negative     Blood, UA 04/28/2018 Trace*    Specific Gravity, UA 04/28/2018 1 025     RBC, UA 04/28/2018 None Seen     WBC, UA 04/28/2018 None Seen     Epithelial Cells 04/28/2018 None Seen     Bacteria, UA 04/28/2018 None Seen     Hyaline Casts, UA 04/28/2018 5-10*    Ventricular Rate 04/28/2018 101     Atrial Rate 04/28/2018 102     QRSD Interval 04/28/2018 150     QT Interval 04/28/2018 400     QTC Interval 04/28/2018 518     P Axis 04/28/2018 91     QRS Axis 04/28/2018 -72     T Wave [place] 04/28/2018 99    Admission on 04/16/2018, Discharged on 04/19/2018   Component Date Value    WBC 04/16/2018 6 10     RBC 04/16/2018 4 98     Hemoglobin 04/16/2018 10 6*    Hematocrit 04/16/2018 33 7*    MCV 04/16/2018 68*    MCH 04/16/2018 21 3*    MCHC 04/16/2018 31 5     RDW 04/16/2018 18 9*    Platelets 63/11/9368 172     nRBC 04/16/2018 0     Neutrophils Relative 04/16/2018 61     Lymphocytes Relative 04/16/2018 24     Monocytes Relative 04/16/2018 12     Eosinophils Relative 04/16/2018 2     Basophils Relative 04/16/2018 1     Neutrophils Absolute 04/16/2018 3 70     Lymphocytes Absolute 04/16/2018 1 49     Monocytes Absolute 04/16/2018 0 75     Eosinophils Absolute 04/16/2018 0 12     Basophils Absolute 04/16/2018 0 03     Protime 04/16/2018 32 7*    INR 04/16/2018 3 14*    PTT 04/16/2018 47*    Sodium 04/16/2018 138     Potassium 04/16/2018 4 5     Chloride 04/16/2018 103     CO2 04/16/2018 24     Anion Gap 04/16/2018 11     BUN 04/16/2018 47*    Creatinine 04/16/2018 1 48*    Glucose 04/16/2018 105     Calcium 04/16/2018 7 1     AST 04/16/2018 48*    ALT 04/16/2018 28     Alkaline Phosphatase 04/16/2018 73     Total Protein 04/16/2018 7 4     Albumin 04/16/2018 3 4*    Total Bilirubin 04/16/2018 0 36     eGFR 04/16/2018 33     Troponin I 04/16/2018 0 06*    Ventricular Rate 04/16/2018 102     Atrial Rate 04/16/2018 102     QRSD Interval 04/16/2018 146     QT Interval 04/16/2018 386     QTC Interval 04/16/2018 503     P Axis 04/16/2018 79     QRS Axis 04/16/2018 -57     T Wave Axis 04/16/2018 97     POC Glucose 04/16/2018 125     Sodium 04/17/2018 139     Potassium 04/17/2018 3 8     Chloride 04/17/2018 107     CO2 04/17/2018 23     Anion Gap 04/17/2018 9     BUN 04/17/2018 36*    Creatinine 04/17/2018 1 07     Glucose 04/17/2018 80     Calcium 04/17/2018 6 6*    AST 04/17/2018 38     ALT 04/17/2018 21     Alkaline Phosphatase 04/17/2018 60     Total Protein 04/17/2018 6 4     Albumin 04/17/2018 2 9*    Total Bilirubin 04/17/2018 0 50     eGFR 04/17/2018 49     WBC 04/17/2018 5 26     RBC 04/17/2018 4 44     Hemoglobin 04/17/2018 9 3*    Hematocrit 04/17/2018 29 9*    MCV 04/17/2018 67*    MCH 04/17/2018 20 9*    MCHC 04/17/2018 31 1*    RDW 04/17/2018 19 0*    Platelets 91/87/1138 140*    POC Glucose 04/17/2018 78     POC Glucose 04/17/2018 167*    POC Glucose 04/17/2018 138     POC Glucose 04/17/2018 159*    Protime 04/18/2018 30 3*    INR 04/18/2018 2 85*    Sodium 04/18/2018 142     Potassium 04/18/2018 4 4     Chloride 04/18/2018 113*    CO2 04/18/2018 19*    Anion Gap 04/18/2018 10     BUN 04/18/2018 23     Creatinine 04/18/2018 1 01     Glucose 04/18/2018 60*    Calcium 04/18/2018 6 4*    eGFR 04/18/2018 53     WBC 04/18/2018 5 92     RBC 04/18/2018 4 91     Hemoglobin 04/18/2018 10 4*    Hematocrit 04/18/2018 33 8*    MCV 04/18/2018 69*    MCH 04/18/2018 21 2*    MCHC 04/18/2018 30 8*    RDW 04/18/2018 19 2*    Platelets 39/66/1536 163     Hemoglobin A1C 04/18/2018 7 7*    EAG 04/18/2018 174     Cholesterol 04/18/2018 101     Triglycerides 04/18/2018 99     HDL, Direct 04/18/2018 35*    LDL Calculated 04/18/2018 46     POC Glucose 04/18/2018 55*    POC Glucose 04/18/2018 104     POC Glucose 04/18/2018 70     POC Glucose 04/18/2018 78     POC Glucose 04/18/2018 183*    POC Glucose 04/18/2018 256*    Sodium 04/19/2018 144     Potassium 04/19/2018 3 9     Chloride 04/19/2018 113*    CO2 04/19/2018 23     Anion Gap 04/19/2018 8     BUN 04/19/2018 17     Creatinine 04/19/2018 0 87     Glucose 04/19/2018 76     Calcium 04/19/2018 6 4*    eGFR 04/19/2018 63     WBC 04/19/2018 5 30     RBC 04/19/2018 4 35     Hemoglobin 04/19/2018 9 3*    Hematocrit 04/19/2018 30 7*    MCV 04/19/2018 71*    MCH 04/19/2018 21 4*    MCHC 04/19/2018 30 3*    RDW 04/19/2018 19 1*    Platelets 73/99/4373 159     POC Glucose 04/19/2018 75     POC Glucose 04/19/2018 162*   Admission on 04/03/2018, Discharged on 04/06/2018   No results displayed because visit has over 200 results        Lab Requisition on 03/29/2018   Component Date Value    ABO Grouping 03/29/2018 O     Rh Factor 03/29/2018 Positive     Antibody Screen 03/29/2018 Negative     Specimen Expiration Date 03/29/2018 [id number]    Anticoag visit on 03/29/2018   Component Date Value    INR 03/29/2018 2 37*   Appointment on 03/29/2018   Component Date Value    ABO Grouping 03/29/2018 O     Rh Factor 03/29/2018 Positive     Antibody Screen 03/29/2018 Negative     Specimen Expiration Date 03/29/2018 [id number]    Clinical Support on 03/29/2018   Component Date Value    Sodium 03/29/2018 137     Potassium 03/29/2018 4 7     Chloride 03/29/2018 100     CO2 03/29/2018 29     Anion Gap 03/29/2018 8     BUN 03/29/2018 29*    Creatinine 03/29/2018 0 98     Glucose, Fasting 03/29/2018 167*    Calcium 03/29/2018 8 2*    AST 03/29/2018 25     ALT 03/29/2018 24     Alkaline Phosphatase 03/29/2018 71     Total Protein 03/29/2018 7 0     Albumin 03/29/2018 3 7     Total Bilirubin 03/29/2018 0 64     eGFR 03/29/2018 55     WBC 03/29/2018 8 88     RBC 03/29/2018 4 89     Hemoglobin 03/29/2018 10 2*    Hematocrit 03/29/2018 34 1*    MCV 03/29/2018 70*    MCH 03/29/2018 20 9*    MCHC 03/29/2018 29 9*    RDW 03/29/2018 18 8*    MPV 03/29/2018 11 8     Platelets 35/36/2530 280     nRBC 03/29/2018 0     Neutrophils Relative 03/29/2018 63     Lymphocytes Relative 03/29/2018 22     Monocytes Relative 03/29/2018 10     Eosinophils Relative 03/29/2018 4     Basophils Relative 03/29/2018 1     Neutrophils Absolute 03/29/2018 5 67     Lymphocytes Absolute 03/29/2018 1 94     Monocytes Absolute 03/29/2018 0 87     Eosinophils Absolute 03/29/2018 0 34     Basophils Absolute 03/29/2018 0 04     Protime 03/29/2018 26 2*    INR 03/29/2018 2 37*   There may be more visits with results that are not included  Imaging: Ct Abdomen Pelvis Wo Contrast    Result Date: 5/23/2018  Narrative: CT ABDOMEN AND PELVIS WITHOUT IV CONTRAST INDICATION:   vomiting/ cant tolerate po  COMPARISON: CT of the abdomen and pelvis on May 18, 2018  TECHNIQUE:  CT examination of the abdomen and pelvis was performed without intravenous contrast   Axial, sagittal, and coronal 2D reformatted images were created from the source data and submitted for interpretation  Radiation dose length product (DLP) for this visit:  567 28 mGy-cm   This examination, like all CT scans performed in the [place], was performed utilizing techniques to minimize radiation dose exposure, including the use of iterative  reconstruction and automated exposure control  Enteric contrast was not administered  FINDINGS: ABDOMEN LOWER CHEST:  Mitral annular calcification  LIVER/BILIARY TREE:  Unremarkable  GALLBLADDER:  Gallbladder is surgically absent  SPLEEN:  Unremarkable  PANCREAS:  Atrophic ADRENAL GLANDS:  Unremarkable  KIDNEYS/URETERS:  One or more simple appearing renal cyst(s) is noted  Otherwise unremarkable kidneys  Redemonstrated bilateral nonobstructing nephrolithiasis  No hydronephrosis  STOMACH AND BOWEL:  The gastric wall appears diffusely thickened which may be seen in setting of gastritis  There is no bowel obstruction  A few hyperdensities are seen within the bowel which likely reflect ingested material or pills  There is liquid stool in the colon which may be seen in setting of diarrhea  APPENDIX:  Status post appendectomy   ABDOMINOPELVIC CAVITY:  No ascites or free intraperitoneal air  No lymphadenopathy  VESSELS:  Moderate atherosclerotic calcifications  PELVIS REPRODUCTIVE ORGANS:  Patient is status post hysterectomy  URINARY BLADDER:  Unremarkable  ABDOMINAL WALL/INGUINAL REGIONS:  Unremarkable  OSSEOUS STRUCTURES:  Degenerative changes of the osseous structures  No aggressive osseous lesion  Impression: 1  Diffuse thickening of the gastric wall which may be seen in setting of gastritis  2   Liquid stool in the colon which may be seen in the setting of diarrheal illness  3   Stable bilateral nonobstructing nephrolithiasis  Workstation performed: [id number]     Ct Abdomen Pelvis Wo Contrast    Result Date: 5/18/2018  Narrative: CT ABDOMEN AND PELVIS WITHOUT IV CONTRAST INDICATION:   Generalized abdominal pain, 5 days of nausea and vomiting, history of multiple abdominal surgeries  5 days of generalized abdominal pain, nausea and vomiting  Multiple prior surgeries  COMPARISON: None  TECHNIQUE:  CT examination of the abdomen and pelvis was performed without intravenous contrast   Axial, sagittal, and coronal 2D reformatted images were created from the source data and submitted for interpretation  Radiation dose length product (DLP) for this visit:  740 39 mGy-cm   This examination, like all CT scans performed in the [facility], was performed utilizing techniques to minimize radiation dose exposure, including the use of iterative  reconstruction and automated exposure control  Enteric contrast was not given  IV contrast was also not given secondary to patient's history of severe allergic reaction and renal insufficiency  FINDINGS: ABDOMEN LOWER CHEST:  Mild emphysema within the lung bases  There is calcification of the mitral annulus  LIVER/BILIARY TREE:  Unremarkable  GALLBLADDER:  Gallbladder is surgically absent  SPLEEN:  Unremarkable  PANCREAS:  Unremarkable  ADRENAL GLANDS:  Unremarkable  KIDNEYS/URETERS:  No hydronephrosis  There are nonobstructing intrarenal calculi noted within the kidneys bilaterally, right more than left  These measure 5 mm or less in size  No ureteral calculi  STOMACH AND BOWEL:  Unremarkable  APPENDIX:  No findings to suggest appendicitis  ABDOMINOPELVIC CAVITY:  No ascites or free intraperitoneal air  No lymphadenopathy  VESSELS:  Moderate atherosclerotic disease of the abdominal aorta and abdominal vasculature  PELVIS REPRODUCTIVE ORGANS:  Unremarkable for patient's age  URINARY BLADDER:  Unremarkable  ABDOMINAL WALL/INGUINAL REGIONS:  Unremarkable  OSSEOUS STRUCTURES:  Stable dextroscoliosis of the thoracolumbar spine  Thoracic and lumbar degenerative disc disease and endplate degenerative changes noted similar to the prior examination with mild scattered canal stenosis and foraminal narrowing  Impression: Stable examination  No acute abdominal or pelvic pathology  Nonobstructing calculi noted within the kidneys  Workstation performed: [id number]     Xr Chest Portable    Result Date: 5/1/2018  Narrative: CHEST INDICATION:     COMPARISON:  AP chest 4/29/2018  EXAM PERFORMED/VIEWS:  XR CHEST PORTABLE FINDINGS: Stable cardiac and mediastinal contours  Median sternotomy wires  Aortic valve replacement  Extensive bilateral hazy airspace disease throughout both lungs with a small left basilar effusion may indicate CHF  No pneumothorax  Osseous structures appear within normal limits for patient age  Impression: Extensive bilateral hazy airspace disease throughout both lungs with a small left basilar effusion may indicate CHF  Multilobar pneumonia is less likely part of the differential diagnosis  Workstation performed: [id number]     Mri Brain Seizure Wo And W Contrast    Result Date: 5/3/2018  Narrative: MRI  BRAIN  - WITH AND WITHOUT CONTRAST INDICATION: seizure/stroke Seizure disorder   COMPARISON: 4/17/2018, MRI brain without contrast  TECHNIQUE:  Sagittal 3D SPGR, axial T2, axial FLAIR, axial T1, axial [place], coronal T2 and FLAIR  Post-contrast axial T1 , Sagittal 3D SPGR with coronal recons  IV Contrast:  10 mL of gadobutrol injection (MULTI-DOSE) IMAGE QUALITY:   Diagnostic  FINDINGS: BRAIN PARENCHYMA:  Confluent hyperintense T2 and FLAIR signal throughout the cerebral hemispheres extending peripherally  White matter hyperintensity within the brainstem  Mild hyperintense signal noted adjacent to the cerebellar vermis within the posterior fossa  These findings are stable compared to the prior examination most suggestive of chronic microangiopathy  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  Small old lacunar infarcts  No mass, mass effect or midline shift  No hemorrhage  Punctate focus of diffusion and amounted within the right posterior frontal white matter, series 3 image 21 may represent a tiny new infarct  Mild diffuse cerebral volume loss  Mild volume loss of the hippocampal formations, bilaterally symmetric without increased signal  Mild focal right occipital cortical enhancement on series 11 image 13 corresponds to the small infarcts seen on prior exam  VENTRICLES:  The ventricles are enlarged bilaterally consistent with the degree of cerebral volume loss and diffuse white matter change  SELLA AND PITUITARY GLAND:  Normal  ORBITS:  Normal  PARANASAL SINUSES:  Normal  VASCULATURE:  Evaluation of the major intracranial vasculature demonstrates appropriate flow voids  CALVARIUM AND SKULL BASE:  Normal  EXTRACRANIAL SOFT TISSUES:  Normal      Impression: Stable cerebral volume loss and diffuse white matter changes most suggestive of extensive chronic microangiopathy  Enhancement of the previously seen small right occipital lobe infarct  There is a new diffusion abnormality measuring only 2 mm within the right centrum semiovale which may represent a small new focus of ischemia  Workstation performed: [id number]       EC/18- sinus with 1AV block  LBBB      Review of Systems:  Review of Systems Constitutional: Positive for fatigue  Negative for activity change, appetite change, chills, diaphoresis and fever  HENT: Negative for congestion  Respiratory: Negative for cough, chest tightness, shortness of breath and wheezing  Cardiovascular: Negative for chest pain, palpitations and leg swelling  Gastrointestinal: Negative for abdominal pain, diarrhea, nausea and vomiting  Genitourinary: Negative for difficulty urinating and dysuria  Musculoskeletal: Negative for back pain  Skin: Negative for color change and rash  Neurological: Negative for dizziness, syncope, facial asymmetry, weakness, light-headedness, numbness and headaches  Psychiatric/Behavioral: Negative for confusion  There were no vitals filed for this visit  There were no vitals filed for this visit  Physical Exam:  General appearance:  Appears stated age, alert, well appearing and in no distress  HEENT:  PERRLA, EOMI, no scleral icterus, no conjunctival pallor  NECK:  Supple, Elevated JVP, no thyromegaly, no carotid bruits  HEART:  Regular rate and rhythm, normal S1/S2, no S3/S4, no murmur or rub  LUNGS:  Clear to auscultation bilaterally in the upper lung fields with crackles at the bases bilaterally  ABDOMEN:  Soft, non-tender, positive bowel sounds, no rebound or guarding, no organomegaly   EXTREMITIES: 1+ edema of lower extremities    VASCULAR:  Normal pedal pulses   SKIN: No lesions or rashes on exposed skin  NEURO:  CN II-XII intact, no focal deficits

## 2018-05-30 NOTE — PATIENT INSTRUCTIONS
Please start taking lasix 40mg once a day  Your dose of lisinopril was increased to 5mg daily from 2 5mg  Please monitor your weight at home and if you notice more than three pound weight gain with symptoms of shortness of breath or increased swelling of the lower legs, take an extra dose of lasix and call the office  Low Sodium Nutrition Plan   Limits the sodium that you get from food and beverages to 1,500- 2,000 milligrams (mg) per day  Salt is the main source of sodium  Read the nutrition label to find out how much sodium is in 1 serving of a food   Select foods with 140 milligrams (mg) of sodium or less per serving  [employer] with more than 300 milligrams (mg) of sodium per serving may not fit into a reduced-sodium meal plan   Check the serving size on the label  If you eat more than 1 serving, you will get more sodium than the amount listed    Tips for Cutting Back on Sodium  ? Avoid processed foods  Eat more fresh foods  Fresh is Best!  Fresh fruits and vegetables are naturally low in sodium, as well as frozen vegetables and fruits that have no added juices or sauces  Fresh meats are lower in sodium than processed meats, such as johnson, sausage, and hotdogs  Read the nutrition label or ask your  to help you find a fresh meat that is low in sodium  No salt shaker when eating or cooking needed  A single teaspoon of table salt has 2,300 mg of sodium!! Be a smart   Look for food packages that say salt-free or sodium-free  These items contain less than 5 mg of sodium per serving  Very low-sodium products contain less than 35 mg of sodium per serving  Low-sodium products contain less than 140 mg of sodium per serving  Beware of Unsalted or No Added Salt products  These items may still be high in sodium  Check the nutrition label  Add flavors to your food without adding sodium  Try lemon juice, lime juice, fruit juice or vinegar  Dry or fresh herbs add flavor   Try basil, bay leaf, dill, rosemary, parsley, ivory, dry mustard, nutmeg, thyme, and paprika  Pepper, red pepper flakes, and cayenne pepper can add spice to your meals without adding sodium  Hot sauce contains sodium, but if you use just a drop or two, it will not add up to much  Buy a sodium-free seasoning blend or make your own at home  For individual nutrition counseling get a referral from your Doctor   and call [phone](option2)

## 2018-05-31 ENCOUNTER — TELEPHONE (OUTPATIENT)
Dept: INTERNAL MEDICINE CLINIC | Facility: CLINIC | Age: 78
End: 2018-05-31

## 2018-05-31 DIAGNOSIS — E87.6 HYPOKALEMIA: Primary | ICD-10-CM

## 2018-05-31 RX ORDER — POTASSIUM CHLORIDE 750 MG/1
10 TABLET, EXTENDED RELEASE ORAL DAILY
Qty: 30 TABLET | Refills: 1 | Status: SHIPPED | OUTPATIENT
Start: 2018-05-31 | End: 2018-07-11 | Stop reason: SDUPTHER

## 2018-05-31 NOTE — TELEPHONE ENCOUNTER
Son in law calling because she saw cardiologist and they put her back on Lasix 1 pill a day, they would like to know if she should take it in the morning or at night? Also wondering if she should be put back on potassium chloride?

## 2018-05-31 NOTE — TELEPHONE ENCOUNTER
LMOM notifying him on instructions of taking Lasix and that the potassium chloride has been ordered

## 2018-06-01 ENCOUNTER — TELEPHONE (OUTPATIENT)
Dept: INTERNAL MEDICINE CLINIC | Age: 78
End: 2018-06-01

## 2018-06-01 NOTE — TELEPHONE ENCOUNTER
Home health nurse called to report that patient fell yesterday  She has a bruised left knee and left shoulder pain but does not want to get checked today  She will go to the ER if she feels worse  Also, nurse reports that patient is not taking calcium due to upset stomach and family wondered if there was something else she should take  Please call patient at home phone [phone]

## 2018-06-01 NOTE — TELEPHONE ENCOUNTER
Patient should set up an appointment to be seen as she just fell, and she is not taking her calcium  Please call and set up an appointment

## 2018-06-03 NOTE — ED PROVIDER NOTES
History  Chief Complaint   Patient presents with    Vomiting     pt vomiting x 1 week  was seen last friday and treated for dehydration d/t vomiting     HPI     33-year-old female presents for crampy abdominal pain nonbloody nonbilious bilious vomiting for the past week  Reported was seen recently for similar and discharged from the emergency room after IV fluids  Reports she has not been would keep anything down recently continues with loose stools  She has no complaints of chest pain or diaphoresis  No fevers chills  No dysuria  Exam reveals tenderness in the lower quadrants without rebound or rigidity  No other modifying factors no other associated symptoms  Assessment plan:  Nausea vomiting who diarrhea  Cannot tolerate p o  CT to evaluate for intestinal abnormality obstruction  IV fluids  Will likely require admission    Prior to Admission Medications   Prescriptions Last Dose Informant Patient Reported? Taking? LANTUS 100 UNIT/ML subcutaneous injection 5/23/2018 at 9pm  No Yes   Sig: Inject 20 Units under the skin 2 (two) times a day   Mometasone Furoate [facility] HFA) 100 MCG/ACT AERO 5/23/2018 at 9am Self No Yes   Sig: Inhale 2 puffs once daily   acetaminophen (TYLENOL) 650 mg CR tablet Past Month at Unknown time  No Yes   Sig: Take 1 tablet (650 mg total) by mouth every 8 (eight) hours as needed for mild pain Do not take in conjunction with Percocet     albuterol (VENTOLIN HFA) 90 mcg/act inhaler Past Month at Unknown time Self Yes Yes   Sig: Inhale 108 mcg 2 (two) times a day as needed 2 puffs bid PRN   aspirin 81 MG tablet 5/23/2018 at 9am Self Yes Yes   Sig: Take 81 mg by mouth daily   atorvastatin (LIPITOR) 80 mg tablet 5/23/2018 at 5pm  No Yes   Sig: Take 1 tablet (80 mg total) by mouth daily   calcium carbonate (TUMS) 500 mg chewable tablet 5/23/2018 at 5pm Self Yes Yes   Sig: Chew 2 tablets 3 (three) times a day     cholecalciferol (VITAMIN D3) 1,000 units tablet 5/23/2018 at [address] Yes Yes   Sig: Take 1,000 Units by mouth daily     docusate sodium (COLACE) 100 mg capsule 2018 at 5pm  No Yes   Sig: Take 1 capsule (100 mg total) by mouth 2 (two) times a day   Patient taking differently: Take 100 mg by mouth 2 (two) times a day as needed (as needed)     ferrous sulfate 325 (65 Fe) mg tablet 2018 at 9am  Yes Yes   Sig: Take 325 mg by mouth daily with breakfast   fluticasone (FLONASE) 50 mcg/act nasal spray 2018 at 9am  Yes Yes   Si spray into each nostril daily   furosemide (LASIX) 40 mg tablet 2018 at 2pm  Yes Yes   Sig: Take 40 mg by mouth 2 (two) times a day     glucose blood test strip   Yes No   Si each by Other route 4 (four) times a day (before meals and at bedtime) Use as instructed   levETIRAcetam (KEPPRA) 750 mg tablet 2018 at 9pm  No Yes   Sig: Take 1 tablet (750 mg total) by mouth every 12 (twelve) hours   levothyroxine 100 mcg tablet 2018 at 6am Self Yes Yes   Sig: Take 100 mcg by mouth daily   loratadine (CLARITIN) 10 mg tablet 2018 at 9am Self No Yes   Sig: Take 1 tablet (10 mg total) by mouth daily   metFORMIN (GLUCOPHAGE) 1000 MG tablet 2018 at 5pm Self Yes Yes   Si,000 mg 2 (two) times a day   metoprolol tartrate (LOPRESSOR) 100 mg tablet 2018 at 9pm  No Yes   Sig: Take 1 tablet (100 mg total) by mouth every 12 (twelve) hours   nitroglycerin (NITROSTAT) 0 4 mg SL tablet Past Month at Unknown time Self Yes Yes   Sig: Place 0 4 mg under the tongue every 3 (three) minutes as needed   pantoprazole (PROTONIX) 40 mg tablet 2018 at 6am  Yes Yes   Sig: Take 40 mg by mouth daily   polyethylene glycol (MIRALAX) 17 g packet 2018 at 9am  Yes Yes   Sig: Take 17 g by mouth daily   rivaroxaban (XARELTO) 20 mg tablet 2018 at 5pm  No Yes   Sig: Take 1 tablet (20 mg total) by mouth daily      Facility-Administered Medications: None       Past Medical History:   Diagnosis Date    Altered mental status     Anticoagulated Coumadin for Aflutter    Asthma     Atrial flutter ([facility] )     maintained on coumadin anticoag    CAD (coronary artery disease)     Candidiasis     Carotid stenosis, bilateral     Cataract     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) [facility]     Chronic fatigue syndrome     Chronic low back pain     Concussion w loss of consciousness of unsp duration, init     Coronary artery disease     CVA (cerebral vascular accident) ([facility] ) 4/17/2018    Diabetes mellitus ([facility] )     type 2, insulin dependent    DJD (degenerative joint disease)     GERD (gastroesophageal reflux disease)     Herpes zoster     HLD (hyperlipidemia)     HTN (hypertension)     Hyperlipidemia     Hypothyroidism     Irritable bowel syndrome     Lumbar radiculopathy     Lyme disease     Dx in hospital 8/2011    Lyme disease     [facility]     Migraines     Muscle spasm     Non-neoplastic nevus     Nontoxic multinodular goiter     OA (osteoarthritis)     Osteoarthritis     Other chronic pain     PAD (peripheral artery disease) ([facility])     Palpitations     Pseudogout     Spinal stenosis     Transient cerebral ischemia     Trigger ring finger     Viral gastroenteritis        Past Surgical History:   Procedure Laterality Date    APPENDECTOMY      ARTERIOGRAM  12/19/2017    CARDIAC CATHETERIZATION      CHOLECYSTECTOMY      COLONOSCOPY      CORONARY ARTERY BYPASS GRAFT  2004    LUMBAR LAMINECTOMY      OK ECHO TRANSESOPHAG R-T 2D W/PRB IMG ACQUISJ I&R N/A 4/3/2018    Procedure: TRANSESOPHAGEAL ECHOCARDIOGRAM (ROBB);   Surgeon: Destiny Church DO;  Location: [facility];  Service: Cardiac Surgery    OK REPLACE AORTIC VALVE OPENFEMORAL ARTERY APPROACH N/A 4/3/2018    Procedure: REPLACEMENT AORTIC VALVE TRANSCATHETER (TAVR) TRANSFEMORAL w/ 26MM IVY YEVGENIY S3 VALVE;  Surgeon: Destiny Church DO;  [facility];  Service: Cardiac Surgery    THYROIDECTOMY      TOTAL ABDOMINAL HYSTERECTOMY W/ BILATERAL SALPINGOOPHORECTOMY         Family History   Problem Relation Age of Onset    Cancer Mother     Stroke Mother     Cancer Father     Heart disease Father     Breast cancer Sister     Diabetes Daughter      Passed away January 2017      I have reviewed and agree with the history as documented  Social History   Substance Use Topics    Smoking status: Never Smoker    Smokeless tobacco: Never Used    Alcohol use No        Review of Systems   Constitutional: Negative for chills, fatigue and fever  Eyes: Negative for photophobia and visual disturbance  Respiratory: Negative for cough and shortness of breath  Cardiovascular: Negative for chest pain, palpitations and leg swelling  Gastrointestinal: Positive for diarrhea and vomiting  Negative for nausea  Endocrine: Negative for polydipsia and polyuria  Genitourinary: Negative for decreased urine volume, difficulty urinating, dysuria and frequency  Musculoskeletal: Negative for back pain, neck pain and neck stiffness  Skin: Negative for color change and rash  Allergic/Immunologic: Negative for environmental allergies and immunocompromised state  Neurological: Negative for dizziness and headaches  Hematological: Negative for adenopathy  Does not bruise/bleed easily  Psychiatric/Behavioral: Negative for dysphoric mood  The patient is not nervous/anxious          Physical Exam  ED Triage Vitals [05/23/18 2210]   Temperature Pulse Respirations Blood Pressure SpO2   97 8 °F (36 6 °C) 70 18 (!) 173/73 97 %      Temp Source Heart Rate Source Patient Position - Orthostatic VS BP Location FiO2 (%)   Tympanic Monitor Sitting Left arm --      Pain Score       No Pain           Orthostatic Vital Signs  Vitals:    05/25/18 1505 05/25/18 2112 05/25/18 2300 05/26/18 0733   BP: 123/50 142/61 128/50 161/61   Pulse: 56 56 (!) 54 55   Patient Position - Orthostatic VS:  Sitting Lying Lying       Physical Exam Constitutional: She is oriented to person, place, and time  She appears well-developed and well-nourished  No distress  HENT:   Head: Normocephalic and atraumatic  Nose: Nose normal    Eyes: Conjunctivae and EOM are normal  Pupils are equal, round, and reactive to light  No scleral icterus  Neck: Normal range of motion  Neck supple  No JVD present  No tracheal deviation present  No thyromegaly present  Cardiovascular: Normal rate, regular rhythm, normal heart sounds and intact distal pulses  Exam reveals no gallop and no friction rub  Pulmonary/Chest: Effort normal and breath sounds normal  No respiratory distress  She has no wheezes  She has no rales  She exhibits no tenderness  Abdominal: Soft  Bowel sounds are normal  She exhibits no distension and no mass  There is no tenderness  There is no rebound and no guarding  No hernia  Musculoskeletal: Normal range of motion  She exhibits no edema, tenderness or deformity  Neurological: She is alert and oriented to person, place, and time  She has normal reflexes  No cranial nerve deficit  Coordination normal    Skin: Skin is warm and dry  She is not diaphoretic  No erythema  Psychiatric: She has a normal mood and affect  Her behavior is normal    Nursing note and vitals reviewed        ED Medications  Medications   promethazine (PHENERGAN) injection 12 5 mg (12 5 mg Intravenous Given 5/23/18 2327)   sodium chloride 0 9 % bolus 500 mL (500 mL Intravenous New Bag 5/23/18 2326)   magnesium sulfate 4 g/100 mL IVPB (premix) 4 g (0 g Intravenous Stopped 5/24/18 1429)   calcium gluconate 2 g in sodium chloride 0 9 % 100 mL IVPB (0 g Intravenous Stopped 5/24/18 1140)       Diagnostic Studies  Results Reviewed     Procedure Component Value Units Date/Time    Lactic acid, plasma [[id number]]  (Normal) Collected:  05/24/18 0303    Lab Status:  Final result Specimen:  Blood Updated:  05/24/18 0334     LACTIC ACID 1 6 mmol/L     Narrative:         Result may be elevated if tourniquet was used during collection  Troponin I [[id number]]  (Normal) Collected:  05/23/18 2329    Lab Status:  Final result Specimen:  Blood from Arm, Right Updated:  05/24/18 0007     Troponin I <0 02 ng/mL     Narrative:         [employer] Chemistry analyzer 99% cutoff is > 0 04 ng/mL in network labs    o cTnI 99% cutoff is useful only when applied to patients in the clinical setting of myocardial ischemia  o cTnI 99% cutoff should be interpreted in the context of clinical history, ECG findings and possibly cardiac imaging to establish correct diagnosis  o cTnI 99% cutoff may be suggestive but clearly not indicative of a coronary event without the clinical setting of myocardial ischemia  Lactic acid, plasma [[id number]]  (Abnormal) Collected:  05/23/18 2219    Lab Status:  Final result Specimen:  Blood from Arm, Right Updated:  05/23/18 2253     LACTIC ACID 2 1 (HH) mmol/L     Narrative:         Result may be elevated if tourniquet was used during collection  Comprehensive metabolic panel [[id number]]  (Abnormal) Collected:  05/23/18 2219    Lab Status:  Final result Specimen:  Blood from Arm, Right Updated:  05/23/18 2248     Sodium 136 mmol/L      Potassium 3 8 mmol/L      Chloride 102 mmol/L      CO2 24 mmol/L      Anion Gap 10 mmol/L      BUN 25 mg/dL      Creatinine 1 42 (H) mg/dL      Glucose 114 mg/dL      Calcium 6 7 (L) mg/dL      AST 25 U/L      ALT 19 U/L      Alkaline Phosphatase 55 U/L      Total Protein 7 9 g/dL      Albumin 3 4 (L) g/dL      Total Bilirubin 0 64 mg/dL      eGFR 35 ml/min/1 73sq m     Narrative:         National Kidney Disease Education Program recommendations are as follows:  GFR calculation is accurate only with a steady state creatinine  Chronic Kidney disease less than 60 ml/min/1 73 sq  meters  Kidney failure less than 15 ml/min/1 73 sq  meters      Lipase [[id number]]  (Normal) Collected:  05/23/18 2219    Lab Status:  Final result Specimen:  Blood from Arm, Right Updated:  05/23/18 2248     Lipase 321 u/L     CBC and differential [[id number]]  (Abnormal) Collected:  05/23/18 2219    Lab Status:  Final result Specimen:  Blood from Arm, Right Updated:  05/23/18 2234     WBC 10 92 (H) Thousand/uL      RBC 4 78 Million/uL      Hemoglobin 10 3 (L) g/dL      Hematocrit 33 6 (L) %      MCV 70 (L) fL      MCH 21 5 (L) pg      MCHC 30 7 (L) g/dL      RDW 21 4 (H) %      MPV 10 4 fL      Platelets 716 Thousands/uL      nRBC 0 /100 WBCs      Neutrophils Relative 68 %      Lymphocytes Relative 21 %      Monocytes Relative 8 %      Eosinophils Relative 2 %      Basophils Relative 0 %      Neutrophils Absolute 7 40 Thousands/µL      Lymphocytes Absolute 2 34 Thousands/µL      Monocytes Absolute 0 87 Thousand/µL      Eosinophils Absolute 0 23 Thousand/µL      Basophils Absolute 0 04 Thousands/µL                  CT abdomen pelvis wo contrast   Final Result by Sulma Kate MD (05/23 2321)      1  Diffuse thickening of the gastric wall which may be seen in setting of gastritis  2   Liquid stool in the colon which may be seen in the setting of diarrheal illness  3   Stable bilateral nonobstructing nephrolithiasis                 Workstation performed: [id number]               Procedures  Procedures      Phone Consults  ED Phone Contact    ED Course                               MDM  Number of Diagnoses or Management Options  Vomiting: new and requires workup     Amount and/or Complexity of Data Reviewed  Clinical lab tests: reviewed and ordered  Tests in the radiology section of CPT®: reviewed and ordered  Tests in the medicine section of CPT®: reviewed and ordered  Independent visualization of images, tracings, or specimens: yes      CritCare Time    Disposition  Final diagnoses:   Vomiting     Time reflects when diagnosis was documented in both MDM as applicable and the Disposition within this note     Time User Action Codes Description Comment    5/23/2018 11:45 PM Ismael Figueroa [R11 10] Vomiting     5/26/2018 10:40 AM Isaias Pop D Add [E83 42] Hypomagnesemia     5/26/2018 10:45 AM Isaias Pop D Add [I48 92] Atrial flutter, unspecified type ([address] )     5/26/2018  2:16 PM Olga Wells Add [R56 9] Seizure [facility])       ED Disposition     ED Disposition Condition Comment    Admit  Case was discussed with SOD and the patient's admission status was agreed to be Admission Status: observation status to the service of Dr Krysta Perry   Follow-up Information     Follow up With Specialties Details Why Leslie Davis MD Internal Medicine Follow up Follow up with PCP within 1 week of discharge from rehabilitation   [address] [place] [address]  [phone]            Discharge Medication List as of 5/26/2018 12:25 PM      START taking these medications    Details   magnesium oxide (MAG-OX) 400 mg Take 1 tablet (400 mg total) by mouth 2 (two) times a day, Starting Sat 5/26/2018, Normal         CONTINUE these medications which have NOT CHANGED    Details   acetaminophen (TYLENOL) 650 mg CR tablet Take 1 tablet (650 mg total) by mouth every 8 (eight) hours as needed for mild pain Do not take in conjunction with Percocet , Starting Fri 4/6/2018, Normal      albuterol (VENTOLIN HFA) 90 mcg/act inhaler Inhale 108 mcg 2 (two) times a day as needed 2 puffs bid PRN, Historical Med      aspirin 81 MG tablet Take 81 mg by mouth daily, Historical Med      atorvastatin (LIPITOR) 80 mg tablet Take 1 tablet (80 mg total) by mouth daily, Starting Thu 4/26/2018, Normal      calcium carbonate (TUMS) 500 mg chewable tablet Chew 2 tablets 3 (three) times a day  , Historical Med      cholecalciferol (VITAMIN D3) 1,000 units tablet Take 1,000 Units by mouth daily  , Starting Thu 10/20/2016, Historical Med      docusate sodium (COLACE) 100 mg capsule Take 1 capsule (100 mg total) by mouth 2 (two) times a day, Starting Fri 4/6/2018, Normal      ferrous sulfate 325 (65 Fe) mg tablet Take 325 mg by mouth daily with breakfast, Historical Med      fluticasone (FLONASE) 50 mcg/act nasal spray 1 spray into each nostril daily, Historical Med      LANTUS 100 UNIT/ML subcutaneous injection Inject 20 Units under the skin 2 (two) times a day, Starting Thu 4/19/2018, No Print      levETIRAcetam (KEPPRA) 750 mg tablet Take 1 tablet (750 mg total) by mouth every 12 (twelve) hours, Starting Mon 5/7/2018, Normal      levothyroxine 100 mcg tablet Take 100 mcg by mouth daily, Historical Med      loratadine (CLARITIN) 10 mg tablet Take 1 tablet (10 mg total) by mouth daily, Starting Wed 3/28/2018, Normal      metFORMIN (GLUCOPHAGE) 1000 MG tablet 1,000 mg 2 (two) times a day, Historical Med      metoprolol tartrate (LOPRESSOR) 100 mg tablet Take 1 tablet (100 mg total) by mouth every 12 (twelve) hours, Starting Fri 4/6/2018, No Print      Mometasone Furoate (ASMANEX HFA) 100 MCG/ACT AERO Inhale 2 puffs once daily, Normal      nitroglycerin (NITROSTAT) 0 4 mg SL tablet Place 0 4 mg under the tongue every 3 (three) minutes as needed, Starting Mon 5/11/2015, Historical Med      pantoprazole (PROTONIX) 40 mg tablet Take 40 mg by mouth daily, Historical Med      polyethylene glycol (MIRALAX) 17 g packet Take 17 g by mouth daily, Historical Med      rivaroxaban (XARELTO) 20 mg tablet Take 1 tablet (20 mg total) by mouth daily, Starting Thu 4/19/2018, No Print      amiodarone 200 mg tablet Take 1 tablet (200 mg total) by mouth daily with breakfast, Starting Sat 5/19/2018, Normal      lisinopril (ZESTRIL) 5 mg tablet Take 2 5 mg by mouth daily at bedtime  , Historical Med      metoclopramide (REGLAN) 10 mg tablet Take 0 5 tablets (5 mg total) by mouth every 6 (six) hours as needed (Nausea and vomiting), Starting Fri 5/18/2018, Print      potassium chloride (K-DUR,KLOR-CON) 10 mEq tablet Take 10 mEq by mouth 2 (two) times a day, Historical Med      glucose blood test strip 1 each by Other route 4 (four) times a day (before meals and at bedtime) Use as instructed, Historical Med         STOP taking these medications       furosemide (LASIX) 40 mg tablet Comments:   Reason for Stopping:               Outpatient Discharge Orders  Basic metabolic panel   Standing Status: Future  Standing Exp  Date: 05/26/19     Magnesium   Standing Status: Future  Standing Exp  Date: 05/26/19     Discharge Diet     Activity as tolerated     Call provider for:  difficulty breathing, headache or visual disturbances     Call provider for:  persistent nausea or vomiting     Call provider for:  persistent dizziness or light-headedness     Call provider for:  severe uncontrolled pain         ED Provider  Attending physically available and evaluated Yareli Casey I managed the patient along with the ED Attending      Electronically Signed by         Pat Davidson DO  06/03/18 9769

## 2018-06-04 DIAGNOSIS — J06.9 URI, ACUTE: ICD-10-CM

## 2018-06-04 DIAGNOSIS — E03.9 HYPOTHYROIDISM, UNSPECIFIED TYPE: Primary | ICD-10-CM

## 2018-06-04 DIAGNOSIS — I48.0 PAROXYSMAL ATRIAL FIBRILLATION (HCC): ICD-10-CM

## 2018-06-04 RX ORDER — LORATADINE 10 MG/1
10 TABLET ORAL DAILY
Qty: 90 TABLET | Refills: 1 | Status: ON HOLD | OUTPATIENT
Start: 2018-06-04 | End: 2018-12-05 | Stop reason: ALTCHOICE

## 2018-06-04 RX ORDER — LEVOTHYROXINE SODIUM 0.1 MG/1
100 TABLET ORAL DAILY
Qty: 90 TABLET | Refills: 1 | Status: SHIPPED | OUTPATIENT
Start: 2018-06-04

## 2018-06-05 ENCOUNTER — TELEPHONE (OUTPATIENT)
Dept: INTERNAL MEDICINE CLINIC | Age: 78
End: 2018-06-05

## 2018-06-05 NOTE — TELEPHONE ENCOUNTER
She has an appointment on 6/13/2018 with Dr Bose Payment  If she continues to have elbow pain, she may reschedule sooner   Thank you for the update/

## 2018-06-06 ENCOUNTER — CLINICAL SUPPORT (OUTPATIENT)
Dept: INTERNAL MEDICINE CLINIC | Facility: CLINIC | Age: 78
End: 2018-06-06
Payer: MEDICARE

## 2018-06-06 DIAGNOSIS — E83.51 HYPOCALCEMIA SYNDROME: ICD-10-CM

## 2018-06-06 DIAGNOSIS — A08.4 VIRAL GASTROENTERITIS: Primary | ICD-10-CM

## 2018-06-06 LAB
ALBUMIN SERPL BCP-MCNC: 3.5 G/DL (ref 3.5–5)
ALP SERPL-CCNC: 67 U/L (ref 46–116)
ALT SERPL W P-5'-P-CCNC: 22 U/L (ref 12–78)
ANION GAP SERPL CALCULATED.3IONS-SCNC: 9 MMOL/L (ref 4–13)
AST SERPL W P-5'-P-CCNC: 22 U/L (ref 5–45)
BILIRUB SERPL-MCNC: 0.72 MG/DL (ref 0.2–1)
BUN SERPL-MCNC: 19 MG/DL (ref 5–25)
CALCIUM SERPL-MCNC: 8.3 MG/DL (ref 8.3–10.1)
CHLORIDE SERPL-SCNC: 99 MMOL/L (ref 100–108)
CO2 SERPL-SCNC: 30 MMOL/L (ref 21–32)
CREAT SERPL-MCNC: 1.03 MG/DL (ref 0.6–1.3)
ERYTHROCYTE [DISTWIDTH] IN BLOOD BY AUTOMATED COUNT: 22.9 % (ref 11.6–15.1)
GFR SERPL CREATININE-BSD FRML MDRD: 51 ML/MIN/1.73SQ M
GLUCOSE P FAST SERPL-MCNC: 128 MG/DL (ref 65–99)
HCT VFR BLD AUTO: 33 % (ref 34.8–46.1)
HGB BLD-MCNC: 9.7 G/DL (ref 11.5–15.4)
MCH RBC QN AUTO: 21.9 PG (ref 26.8–34.3)
MCHC RBC AUTO-ENTMCNC: 29.4 G/DL (ref 31.4–37.4)
MCV RBC AUTO: 75 FL (ref 82–98)
PLATELET # BLD AUTO: 270 THOUSANDS/UL (ref 149–390)
PMV BLD AUTO: 10.5 FL (ref 8.9–12.7)
POTASSIUM SERPL-SCNC: 4.7 MMOL/L (ref 3.5–5.3)
PROT SERPL-MCNC: 7.2 G/DL (ref 6.4–8.2)
RBC # BLD AUTO: 4.42 MILLION/UL (ref 3.81–5.12)
SODIUM SERPL-SCNC: 138 MMOL/L (ref 136–145)
WBC # BLD AUTO: 11.95 THOUSAND/UL (ref 4.31–10.16)

## 2018-06-06 PROCEDURE — 80053 COMPREHEN METABOLIC PANEL: CPT | Performed by: INTERNAL MEDICINE

## 2018-06-06 PROCEDURE — 36415 COLL VENOUS BLD VENIPUNCTURE: CPT

## 2018-06-06 PROCEDURE — 85027 COMPLETE CBC AUTOMATED: CPT | Performed by: INTERNAL MEDICINE

## 2018-06-12 ENCOUNTER — OFFICE VISIT (OUTPATIENT)
Dept: INTERNAL MEDICINE CLINIC | Age: 78
End: 2018-06-12
Payer: MEDICARE

## 2018-06-12 VITALS
OXYGEN SATURATION: 97 % | HEART RATE: 68 BPM | SYSTOLIC BLOOD PRESSURE: 140 MMHG | DIASTOLIC BLOOD PRESSURE: 66 MMHG | WEIGHT: 159 LBS | HEIGHT: 62 IN | TEMPERATURE: 98.6 F | BODY MASS INDEX: 29.26 KG/M2

## 2018-06-12 DIAGNOSIS — R30.0 DYSURIA: ICD-10-CM

## 2018-06-12 DIAGNOSIS — N39.0 URINARY TRACT INFECTION WITH HEMATURIA, SITE UNSPECIFIED: Primary | ICD-10-CM

## 2018-06-12 DIAGNOSIS — R31.9 URINARY TRACT INFECTION WITH HEMATURIA, SITE UNSPECIFIED: Primary | ICD-10-CM

## 2018-06-12 LAB
SL AMB  POCT GLUCOSE, UA: NORMAL
SL AMB LEUKOCYTE ESTERASE,UA: NORMAL
SL AMB POCT BILIRUBIN,UA: NORMAL
SL AMB POCT BLOOD,UA: NORMAL
SL AMB POCT CLARITY,UA: NORMAL
SL AMB POCT COLOR,UA: YELLOW
SL AMB POCT KETONES,UA: NORMAL
SL AMB POCT NITRITE,UA: NORMAL
SL AMB POCT PH,UA: 7
SL AMB POCT SPECIFIC GRAVITY,UA: 1.01
SL AMB POCT URINE PROTEIN: NORMAL
SL AMB POCT UROBILINOGEN: 0.2

## 2018-06-12 PROCEDURE — 87186 SC STD MICRODIL/AGAR DIL: CPT | Performed by: NURSE PRACTITIONER

## 2018-06-12 PROCEDURE — 87086 URINE CULTURE/COLONY COUNT: CPT | Performed by: NURSE PRACTITIONER

## 2018-06-12 PROCEDURE — 99212 OFFICE O/P EST SF 10 MIN: CPT | Performed by: NURSE PRACTITIONER

## 2018-06-12 PROCEDURE — 87077 CULTURE AEROBIC IDENTIFY: CPT | Performed by: NURSE PRACTITIONER

## 2018-06-12 PROCEDURE — 81002 URINALYSIS NONAUTO W/O SCOPE: CPT | Performed by: NURSE PRACTITIONER

## 2018-06-12 RX ORDER — NITROFURANTOIN 25; 75 MG/1; MG/1
100 CAPSULE ORAL 2 TIMES DAILY
Qty: 14 CAPSULE | Refills: 0 | Status: SHIPPED | OUTPATIENT
Start: 2018-06-12 | End: 2018-06-15 | Stop reason: ALTCHOICE

## 2018-06-12 RX ORDER — TRAMADOL HYDROCHLORIDE 50 MG/1
50 TABLET ORAL 2 TIMES DAILY PRN
Refills: 0 | COMMUNITY
Start: 2018-06-08 | End: 2018-06-13

## 2018-06-12 RX ORDER — DIPHENHYDRAMINE HCL 50 MG/1
CAPSULE ORAL
Refills: 0 | COMMUNITY
Start: 2018-03-29 | End: 2018-06-21 | Stop reason: HOSPADM

## 2018-06-12 RX ORDER — NITROFURANTOIN 25; 75 MG/1; MG/1
100 CAPSULE ORAL 2 TIMES DAILY
Refills: 0 | Status: CANCELLED | OUTPATIENT
Start: 2018-06-12

## 2018-06-12 RX ORDER — PHENAZOPYRIDINE HYDROCHLORIDE 200 MG/1
200 TABLET, FILM COATED ORAL
Qty: 10 TABLET | Refills: 0 | Status: SHIPPED | OUTPATIENT
Start: 2018-06-12 | End: 2018-09-07

## 2018-06-12 NOTE — PROGRESS NOTES
Assessment/Plan:      Patient was instructed to call the office should her symptoms worsen on present medications  She does have an appoint with Dr Thuan Munoz for tomorrow which she will keep  Diagnoses and all orders for this visit:    Urinary tract infection with hematuria, site unspecified   Due to patient's multiple antibiotic "allergies", I have started her on Macrobid 100 mg to take 1 tablet 2 times a day for the next 7 days  If the antibiotic of choice for this patient would have been Cipro but she states she is allergic to Levaquin which is also a fluoroquinolone  I will send patient's urine out for culture and sensitivity  I told patient that if Nathanael Godinez is not susceptible to the bacteria that has grown, that I would call her and change her antibiotic at that time  -     nitrofurantoin (MACROBID) 100 mg capsule; Take 1 capsule (100 mg total) by mouth 2 (two) times a day    Dysuria    Patient was given Pyridium 200 mg 1 tablet 3 times daily to be taken with meals for the next 3 days  I did warn the patient that her urine will be R inch/ read due to this medication  -     phenazopyridine (PYRIDIUM) 200 mg tablet; Take 1 tablet (200 mg total) by mouth 3 (three) times a day with meals      Subjective:      Patient ID: Jewel Marmolejo is a [de-identified] y o  female  Patient is here for an acute visit for burning with urination and frequency  She states her symptoms started this morning  and have been accompanied by chills  Patient was hospitalized on numerous occasions over the past few months and most recently had been discharged to her [facility] in for rehab  She completed rehab and has been home for the past 2 weeks  She states she was "not allowed" to drink a lot of water in rehab but does not know the reason as to why her oral intake was limited  She states she has increased her fluid intake since home  Patient does have an appointment tomorrow with Dr Thuan Munoz for transition of care      The following portions of the patient's history were reviewed and updated as appropriate: allergies, current medications, past family history, past medical history, past social history, past surgical history and problem list     Review of Systems   Constitutional: Positive for chills  Negative for fever  Gastrointestinal: Positive for nausea  Negative for abdominal distention, abdominal pain, constipation, diarrhea and vomiting  Genitourinary: Positive for dysuria, frequency and urgency  Negative for difficulty urinating  Skin: Negative  Hematological: Negative  Objective:    /66 (BP Location: Left arm, Patient Position: Sitting, Cuff Size: Standard)   Pulse 68   Temp 98 6 °F (37 °C) (Tympanic)   Ht 5' 2" (1 575 m)   Wt 72 1 kg (159 lb)   LMP  (LMP Unknown)   SpO2 97%   BMI 29 08 kg/m²      Physical Exam   Constitutional: She appears well-developed and well-nourished  HENT:   Head: Normocephalic and atraumatic  Cardiovascular: Normal rate  Pulmonary/Chest: Effort normal and breath sounds normal    Abdominal: Bowel sounds are normal  She exhibits no distension  There is no tenderness  Skin: Skin is warm and dry  Urine dip in the office is positive for moderate leukos and small amount of blood    Will send to lab for C&S

## 2018-06-12 NOTE — PATIENT INSTRUCTIONS
Start antibiotic 1 tablet twice daily for 7 days  Take pyridium (helps with painful urination) - 1 tablet three times a day with meals  Keep appointment for tomorrow  Call office if symptoms worsen or do not improve

## 2018-06-13 ENCOUNTER — OFFICE VISIT (OUTPATIENT)
Dept: INTERNAL MEDICINE CLINIC | Age: 78
End: 2018-06-13
Payer: MEDICARE

## 2018-06-13 ENCOUNTER — OFFICE VISIT (OUTPATIENT)
Dept: NEUROLOGY | Facility: CLINIC | Age: 78
End: 2018-06-13
Payer: MEDICARE

## 2018-06-13 VITALS
BODY MASS INDEX: 29.26 KG/M2 | OXYGEN SATURATION: 96 % | SYSTOLIC BLOOD PRESSURE: 134 MMHG | WEIGHT: 159 LBS | TEMPERATURE: 97.4 F | DIASTOLIC BLOOD PRESSURE: 64 MMHG | HEART RATE: 66 BPM | HEIGHT: 62 IN

## 2018-06-13 VITALS
HEIGHT: 62 IN | WEIGHT: 155 LBS | BODY MASS INDEX: 28.52 KG/M2 | DIASTOLIC BLOOD PRESSURE: 67 MMHG | SYSTOLIC BLOOD PRESSURE: 154 MMHG | HEART RATE: 74 BPM

## 2018-06-13 DIAGNOSIS — E11.8 TYPE 2 DIABETES MELLITUS WITH COMPLICATION, WITH LONG-TERM CURRENT USE OF INSULIN (HCC): Primary | ICD-10-CM

## 2018-06-13 DIAGNOSIS — Z79.4 TYPE 2 DIABETES MELLITUS WITH COMPLICATION, WITH LONG-TERM CURRENT USE OF INSULIN (HCC): Primary | ICD-10-CM

## 2018-06-13 DIAGNOSIS — I48.0 PAROXYSMAL ATRIAL FIBRILLATION (HCC): ICD-10-CM

## 2018-06-13 DIAGNOSIS — I10 ESSENTIAL HYPERTENSION: ICD-10-CM

## 2018-06-13 DIAGNOSIS — E89.0 POSTOPERATIVE HYPOTHYROIDISM: ICD-10-CM

## 2018-06-13 DIAGNOSIS — R56.9 SEIZURE (HCC): ICD-10-CM

## 2018-06-13 DIAGNOSIS — I48.92 ATRIAL FLUTTER, UNSPECIFIED TYPE (HCC): ICD-10-CM

## 2018-06-13 DIAGNOSIS — Z86.73 HISTORY OF CVA (CEREBROVASCULAR ACCIDENT): Primary | ICD-10-CM

## 2018-06-13 PROCEDURE — 99214 OFFICE O/P EST MOD 30 MIN: CPT | Performed by: PSYCHIATRY & NEUROLOGY

## 2018-06-13 PROCEDURE — 99213 OFFICE O/P EST LOW 20 MIN: CPT | Performed by: INTERNAL MEDICINE

## 2018-06-13 RX ORDER — LEVETIRACETAM 750 MG/1
750 TABLET ORAL EVERY 12 HOURS SCHEDULED
Qty: 60 TABLET | Refills: 11 | Status: ON HOLD | OUTPATIENT
Start: 2018-06-13 | End: 2018-09-24

## 2018-06-13 NOTE — PROGRESS NOTES
Patient ID: Lavinia Rogers is a [address] y o  female with Atrial Fibrilation with piror stroke, on anticoagulation with Xarelto among multiple other medical problems, who is following up in the Neurology office for follow up of a recent hospitalization for an aortic valve replacement, after which, she had a post-operative seizure  Assessment/Plan:    Seizure [facility])  She has not had any additional seizures since her hospitalization and since being started on Levetiracetam  The large event she had in the hospital like was a generalized tonic clonic seizure  It is unclear if her episodes of aphasia were also seizures  She did have some embolic infarctions on her MRI, which likely were related to her atrial fibrillation and recent aortic valve replacement  It is not clear if these embolic events caused her episodes of aphasia, but since they were were multiple self limted events of aphasia, I feel it is more likely these events were seizures  -- Either way, she is no longer having any events on her current medications  I will have her continue Levetiracetam 750 mg twice a day, unchanged  If she would have any additional seizures, her dose could be increased  -- We discussed seizure first aid and seizure safety  She does not drive    History of CVA (cerebrovascular accident)  Her embolic infarctions are likely due to her prior stroke, as noted above  She has been doing well without any recurrent symptoms, and is now on Xarelto for anticoagulation  She will continue to follow with Cardiology and with her PCP for ongoing management of her risk factors  I spent a total of 25 min with the patient with greater than 50% of that time spent counseling and coordinating her care, specifically discussing her diagnosis, need for medications, an plan, as detailed above       She will return to the office in about 3 months  Subjective:    HPI    Current seizure medications:  1   Levetiracetam 750 mg twice a day  Other medications as per Epic    Briefly reviewing her history, in April of 2018, she was admitted to the hospital for replacement of her aortic valve  She did well with the surgery, and was discharged to a rehab facility, but while there, she had 3 episodes of expressive aphasia, and returned to the hospital in mid-April  During her admission, she was found to have multiple cerebral infarctions in multiple vascular territories  She and her son do not recall many details of what happened, but per records, while still in the hospital, she had an event described as a "generalized tonic-clonic seizure"  She was very confused afterward, did get IV lorazepam, and was started on Levetiracetam 750 mg twice a day  She was monitored briefly on VEEG, and no seizures or epileptiform discharges were captured  She clinically improved gradually and was able to be discharged to a Rehab  Since being discharged from the hospital, she has been doing well  She has not had any additional seizures  She has been taking Levetiracetam and is tolerating it well without any seizures  She did initially go to rehab, but has returned home with her son, and has been home for the last 2 weeks  She has been having more difficulty with her mobility since her prolonged hospitalization, but she is planned to work with home therapies to improve her mobility    Prior AEDs:  none    Prior Evaluation:  - MRI brain: 5/3/2018: Stable cerebral volume loss and diffuse white matter changes most suggestive of extensive chronic microangiopathy  Enhancement of the previously seen small right occipital lobe infarct  There is a new diffusion abnormality measuring only 2 mm within the right centrum semiovale which may represent a small new focus of ischemia   - MRI Brain: 4/17/2018: 4 punctate foci of diffusion restriction indicative of scattered tiny acute/recent lacunar infarctions in 4 distinct vascular territories implicating a central embolic source  Advanced, chronic microangiopathy   - Routine EEG: none  - Video EE/: abnormal  Background activities are too slow, suggesting moderate diffuse cerebral dysfunction of non-specific etiology, but possibly with toxic/metabolic contributions given the presence of triphasic waves  No electrographic seizures or definitive interictal epileptiform discharges were seen    Her history was also obtained from her son, who was present at today's visit  I reviewed prior records including notes from her prior hospitalization, [facility] and summarized above  The following portions of the patient's history were reviewed and updated as appropriate: allergies, current medications and problem list          Objective:    Blood pressure 154/67, pulse 74, height 5' 2" (1 575 m), weight 70 3 kg (155 lb), not currently breastfeeding  Physical Exam    Neurological Exam      ROS:    Review of Systems   Constitutional: Positive for appetite change and fatigue  Recent weight loss   HENT: Negative  Eyes: Negative  Respiratory: Negative  Cardiovascular: Negative  Gastrointestinal: Positive for nausea and vomiting  Negative for constipation  Endocrine: Negative  Genitourinary: Negative  Musculoskeletal: Negative  Allergic/Immunologic: Negative  Neurological: Positive for headaches  Hematological: Negative  Psychiatric/Behavioral: Negative

## 2018-06-13 NOTE — PATIENT INSTRUCTIONS
-- Continue to take Levetiracetam (Keppra) 750 mg twice a day  -- Continue to take Aspirin 81 mg daily  -- Call our office if any problems arise

## 2018-06-13 NOTE — PROGRESS NOTES
Assessment/Plan:    1  Essential hypertension   blood pressure is well controlled on present regimen we will continue the same dose     2  Chronic atrial fibrillation   rate is well controlled and she is also on Xarelto     3  Microcytic anemia   continue with ferrous sulfate and will continue to monitor CBC     4  Seizure disorder   stable, no more seizure  She is on Keppra and also being followed by neurologist     5  Type 2 diabetes mellitus     blood sugars well controlled  Continue present regimen of Lantus       Diagnoses and all orders for this visit:    Type 2 diabetes mellitus with complication, with long-term current use of insulin (HCC)  -     CBC; Future  -     Hemoglobin A1C; Future  -     Basic metabolic panel; Future    Postoperative hypothyroidism    Essential hypertension  -     Basic metabolic panel; Future    Paroxysmal atrial fibrillation (HCC)          Subjective:          Patient ID: Lavinia Rogers is a [de-identified] y o  female  Hypertension   This is a chronic problem  The current episode started more than 1 year ago  The problem is controlled  Pertinent negatives include no chest pain, headaches, neck pain, palpitations, peripheral edema or shortness of breath  There are no associated agents to hypertension  Risk factors for coronary artery disease include diabetes mellitus and dyslipidemia  Past treatments include beta blockers and ACE inhibitors  The current treatment provides significant improvement  Hypertensive end-organ damage includes CAD/MI and PVD  The following portions of the patient's history were reviewed and updated as appropriate: allergies, current medications, past family history, past medical history, past social history, past surgical history and problem list     Review of Systems   Constitutional: Negative for fatigue and fever  HENT: Negative for congestion, ear discharge, ear pain, postnasal drip, sinus pressure, sore throat, tinnitus and trouble swallowing      Eyes: Negative for discharge, itching and visual disturbance  Respiratory: Negative for cough and shortness of breath  Cardiovascular: Negative for chest pain, palpitations and leg swelling  Gastrointestinal: Negative for abdominal pain, diarrhea, nausea and vomiting  Endocrine: Negative for cold intolerance and polyuria  Genitourinary: Negative for difficulty urinating, dysuria and urgency  Musculoskeletal: Negative for arthralgias and neck pain  Skin: Negative for rash  Allergic/Immunologic: Negative for environmental allergies  Neurological: Negative for dizziness, seizures, weakness and headaches  Psychiatric/Behavioral: Negative for agitation  The patient is not nervous/anxious            Past Medical History:   Diagnosis Date    Altered mental status     Anticoagulated     Coumadin for Aflutter    Asthma     Atrial flutter [facility]     maintained on coumadin anticoag    CAD (coronary artery disease)     Candidiasis     Carotid stenosis, bilateral     Cataract     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) ([facility]     Chronic fatigue syndrome     Chronic low back pain     Concussion w loss of consciousness of unsp duration, init     Coronary artery disease     CVA (cerebral vascular accident) ([facility] [address] ) 4/17/2018    Diabetes mellitus ([facility] [address] )     type 2, insulin dependent    DJD (degenerative joint disease)     GERD (gastroesophageal reflux disease)     Herpes zoster     HLD (hyperlipidemia)     HTN (hypertension)     Hyperlipidemia     Hypothyroidism     Irritable bowel syndrome     Lumbar radiculopathy     Lyme disease     Dx in hospital 8/2011    Lyme disease     MI (myocardial infarction) ([facility] [address] )     Migraines     Muscle spasm     Non-neoplastic nevus     Nontoxic multinodular goiter     OA (osteoarthritis)     Osteoarthritis     Other chronic pain     PAD [facility]peripheral artery disease) [facility]     Palpitations     Pseudogout     Spinal stenosis  Transient cerebral ischemia     Trigger ring finger     Viral gastroenteritis          Current Outpatient Prescriptions:     acetaminophen (TYLENOL) 650 mg CR tablet, Take 1 tablet (650 mg total) by mouth every 8 (eight) hours as needed for mild pain Do not take in conjunction with Percocet , Disp: 90 tablet, Rfl: 0    albuterol (VENTOLIN HFA) 90 mcg/act inhaler, Inhale 108 mcg 2 (two) times a day as needed 2 puffs bid PRN, Disp: , Rfl:     amiodarone 200 mg tablet, Take 1 tablet (200 mg total) by mouth 2 (two) times a day (Patient taking differently: Take 200 mg by mouth daily  ), Disp: 30 tablet, Rfl: 0    aspirin 81 MG tablet, Take 81 mg by mouth daily, Disp: , Rfl:     atorvastatin (LIPITOR) 80 mg tablet, Take 1 tablet (80 mg total) by mouth daily, Disp: 30 tablet, Rfl: 2    BANOPHEN 50 MG capsule, TAKE ONE CAPSULE NIGHT PRIOR TO PROCEDURE AND MORNING OF , Disp: , Rfl: 0    calcium carbonate (TUMS) 500 mg chewable tablet, Chew 2 tablets 3 (three) times a day  , Disp: , Rfl:     cholecalciferol (VITAMIN D3) 1,000 units tablet, Take 1,000 Units by mouth 2 (two) times a day  , Disp: , Rfl:     docusate sodium (COLACE) 100 mg capsule, Take 1 capsule (100 mg total) by mouth 2 (two) times a day (Patient taking differently: Take 100 mg by mouth 2 (two) times a day as needed (as needed)  ), Disp: 10 capsule, Rfl: 0    ferrous sulfate 325 (65 Fe) mg tablet, Take 325 mg by mouth daily with breakfast, Disp: , Rfl:     fluticasone (FLONASE) 50 mcg/act nasal spray, 1 spray into each nostril daily, Disp: , Rfl:     furosemide (LASIX) 40 mg tablet, Take 1 tablet (40 mg total) by mouth daily, Disp: 90 tablet, Rfl: 3    glucose blood test strip, 1 each by Other route 4 (four) times a day (before meals and at bedtime) Use as instructed, Disp: , Rfl:     LANTUS 100 UNIT/ML subcutaneous injection, Inject 20 Units under the skin 2 (two) times a day, Disp: 10 mL, Rfl: 0    levETIRAcetam (KEPPRA) 750 mg tablet, Take 1 tablet (750 mg total) by mouth every 12 (twelve) hours, Disp: 60 tablet, Rfl: 0    levothyroxine 100 mcg tablet, Take 1 tablet (100 mcg total) by mouth daily, Disp: 90 tablet, Rfl: 1    lisinopril (ZESTRIL) 5 mg tablet, Take 1 tablet (5 mg total) by mouth daily, Disp: 90 tablet, Rfl: 3    loratadine (CLARITIN) 10 mg tablet, Take 1 tablet (10 mg total) by mouth daily, Disp: 90 tablet, Rfl: 1    magnesium oxide (MAG-OX) 400 mg, Take 1 tablet (400 mg total) by mouth 2 (two) times a day, Disp: 60 tablet, Rfl: 0    metFORMIN (GLUCOPHAGE) 1000 MG tablet, 1,000 mg 2 (two) times a day, Disp: , Rfl:     metoclopramide (REGLAN) 10 mg tablet, Take 0 5 tablets (5 mg total) by mouth every 6 (six) hours as needed (Nausea and vomiting), Disp: 30 tablet, Rfl: 1    metoprolol tartrate (LOPRESSOR) 100 mg tablet, Take 1 tablet (100 mg total) by mouth every 12 (twelve) hours, Disp: , Rfl: 0    Mometasone Furoate (ASMANEX HFA) 100 MCG/ACT AERO, Inhale 2 puffs once daily, Disp: 1 Inhaler, Rfl: 5    nitrofurantoin (MACROBID) 100 mg capsule, Take 1 capsule (100 mg total) by mouth 2 (two) times a day, Disp: 14 capsule, Rfl: 0    nitroglycerin (NITROSTAT) 0 4 mg SL tablet, Place 0 4 mg under the tongue every 3 (three) minutes as needed, Disp: , Rfl:     pantoprazole (PROTONIX) 40 mg tablet, Take 40 mg by mouth daily, Disp: , Rfl:     phenazopyridine (PYRIDIUM) 200 mg tablet, Take 1 tablet (200 mg total) by mouth 3 (three) times a day with meals, Disp: 10 tablet, Rfl: 0    polyethylene glycol (MIRALAX) 17 g packet, Take 17 g by mouth daily, Disp: , Rfl:     potassium chloride (K-DUR,KLOR-CON) 10 mEq tablet, Take 1 tablet (10 mEq total) by mouth daily, Disp: 30 tablet, Rfl: 1    rivaroxaban (XARELTO) 20 mg tablet, Take 1 tablet (20 mg total) by mouth daily, Disp: 90 tablet, Rfl: 0    traMADol (ULTRAM) 50 mg tablet, Take 50 mg by mouth 2 (two) times a day as needed for moderate pain, Disp: , Rfl: 0    Allergies   Allergen Reactions    Other Chest Pain     IVP-listed as "chest pain" in previous chart, patient stated this occurred "a long time ago" but does not remember exactly occurred     Cephalosporins Chest Pain    Contrast [Iodinated Diagnostic Agents] Other (See Comments)     Flash pulm edema    Doxycycline Chest Pain    Levaquin [Levofloxacin] Chest Pain    Ondansetron Chest Pain     Prolonged QT    Toradol [Ketorolac Tromethamine] Chest Pain       Social History   Past Surgical History:   Procedure Laterality Date    APPENDECTOMY      ARTERIOGRAM  12/19/2017    CARDIAC CATHETERIZATION      CHOLECYSTECTOMY      COLONOSCOPY      CORONARY ARTERY BYPASS GRAFT  2004    LUMBAR LAMINECTOMY      RI ECHO TRANSESOPHAG R-T 2D W/PRB IMG ACQUISJ I&R N/A 4/3/2018    Procedure: TRANSESOPHAGEAL ECHOCARDIOGRAM (ROBB); Surgeon: Thomas Umanzor DO;  Location: [facility];  Service: Cardiac Surgery    RI REPLACE AORTIC VALVE OPENFEMORAL ARTERY APPROACH N/A 4/3/2018    Procedure: REPLACEMENT AORTIC VALVE TRANSCATHETER (TAVR) TRANSFEMORAL w/ 26MM IVY YEVGENIY S3 VALVE;  Surgeon: Thomas Umanzor DO;  Location: [facility];  Service: Cardiac Surgery    THYROIDECTOMY      TOTAL ABDOMINAL HYSTERECTOMY W/ BILATERAL SALPINGOOPHORECTOMY       Family History   Problem Relation Age of Onset    Cancer Mother     Stroke Mother     Cancer Father     Heart disease Father     Breast cancer Sister     Diabetes Daughter      Passed away January 2017        Objective:  /64 (BP Location: Left arm, Patient Position: Sitting, Cuff Size: Adult)   Pulse 66   Temp (!) 97 4 °F (36 3 °C) (Tympanic)   Ht 5' 2" (1 575 m)   Wt 72 1 kg (159 lb)   LMP  (LMP Unknown)   SpO2 96% Comment: room air  BMI 29 08 kg/m²   Body mass index is 29 08 kg/m²  Physical Exam   Constitutional: She appears well-developed  HENT:   Head: Normocephalic     Right Ear: External ear normal    Left Ear: External ear normal    Mouth/Throat: Oropharynx is clear and moist    Eyes: Pupils are equal, round, and reactive to light  No scleral icterus  Neck: Normal range of motion  Neck supple  No tracheal deviation present  No thyromegaly present  Cardiovascular: Normal rate and normal heart sounds  Rhythm is regularly irregular    Dry gangrene noted over both  Feet  With decreased dorsalis pedis and posterior tibial pulses   Pulmonary/Chest: Effort normal  No respiratory distress  She has rales  She exhibits no tenderness  Abdominal: Soft  Bowel sounds are normal  She exhibits no mass  There is no tenderness  Musculoskeletal: Normal range of motion  She exhibits no edema  Lymphadenopathy:     She has no cervical adenopathy  Neurological: She is alert  No cranial nerve deficit  Skin: Skin is warm  There is erythema  Psychiatric: She has a normal mood and affect

## 2018-06-14 DIAGNOSIS — L97.509 ATHEROSCLEROSIS OF NATIVE ARTERY OF LOWER EXTREMITY WITH ULCERATION OF FOOT (HCC): Primary | ICD-10-CM

## 2018-06-14 DIAGNOSIS — I70.25 ATHEROSCLEROSIS OF NATIVE ARTERY OF LOWER EXTREMITY WITH ULCERATION OF FOOT (HCC): Primary | ICD-10-CM

## 2018-06-15 DIAGNOSIS — E83.42 HYPOMAGNESEMIA: ICD-10-CM

## 2018-06-15 DIAGNOSIS — R31.9 URINARY TRACT INFECTION WITH HEMATURIA, SITE UNSPECIFIED: Primary | ICD-10-CM

## 2018-06-15 DIAGNOSIS — E61.1 IRON DEFICIENCY: ICD-10-CM

## 2018-06-15 DIAGNOSIS — N39.0 URINARY TRACT INFECTION WITH HEMATURIA, SITE UNSPECIFIED: Primary | ICD-10-CM

## 2018-06-15 LAB — BACTERIA UR CULT: ABNORMAL

## 2018-06-15 RX ORDER — AMPICILLIN 500 MG/1
500 CAPSULE ORAL 4 TIMES DAILY
Qty: 20 CAPSULE | Refills: 0 | Status: SHIPPED | OUTPATIENT
Start: 2018-06-15 | End: 2018-06-21 | Stop reason: HOSPADM

## 2018-06-15 RX ORDER — FERROUS SULFATE 325(65) MG
325 TABLET ORAL
Qty: 90 TABLET | Refills: 1 | Status: SHIPPED | OUTPATIENT
Start: 2018-06-15 | End: 2018-08-24 | Stop reason: HOSPADM

## 2018-06-15 NOTE — TELEPHONE ENCOUNTER
Antibiotic for UTI changed from Nitrofuratoin to Ampicillin (cannot use Cipro due to Amiodorone) due to resistance  Patient did have "side effect" of "chest pain" with levaquin  Will try Ampicillin  Patient is scheduled for follow up in 2 weeks or sooner if need be

## 2018-06-19 ENCOUNTER — APPOINTMENT (EMERGENCY)
Dept: RADIOLOGY | Facility: HOSPITAL | Age: 78
DRG: 309 | End: 2018-06-19
Payer: MEDICARE

## 2018-06-19 ENCOUNTER — HOSPITAL ENCOUNTER (INPATIENT)
Facility: HOSPITAL | Age: 78
LOS: 1 days | Discharge: HOME HEALTH | DRG: 309 | End: 2018-06-21
Attending: EMERGENCY MEDICINE | Admitting: INTERNAL MEDICINE
Payer: MEDICARE

## 2018-06-19 ENCOUNTER — OFFICE VISIT (OUTPATIENT)
Dept: INTERNAL MEDICINE CLINIC | Facility: CLINIC | Age: 78
End: 2018-06-19
Payer: MEDICARE

## 2018-06-19 VITALS
TEMPERATURE: 98.1 F | OXYGEN SATURATION: 96 % | SYSTOLIC BLOOD PRESSURE: 128 MMHG | WEIGHT: 158.4 LBS | HEIGHT: 62 IN | HEART RATE: 50 BPM | DIASTOLIC BLOOD PRESSURE: 70 MMHG | BODY MASS INDEX: 29.15 KG/M2

## 2018-06-19 DIAGNOSIS — D64.9 ANEMIA: ICD-10-CM

## 2018-06-19 DIAGNOSIS — Z79.4 TYPE 2 DIABETES MELLITUS WITH COMPLICATION, WITH LONG-TERM CURRENT USE OF INSULIN (HCC): ICD-10-CM

## 2018-06-19 DIAGNOSIS — R42 DIZZINESS: ICD-10-CM

## 2018-06-19 DIAGNOSIS — R07.9 CHEST PAIN AT REST: Primary | ICD-10-CM

## 2018-06-19 DIAGNOSIS — I48.0 PAROXYSMAL ATRIAL FIBRILLATION (HCC): ICD-10-CM

## 2018-06-19 DIAGNOSIS — E83.42 HYPOMAGNESEMIA: Primary | ICD-10-CM

## 2018-06-19 DIAGNOSIS — I50.42 CHRONIC COMBINED SYSTOLIC AND DIASTOLIC CONGESTIVE HEART FAILURE (HCC): ICD-10-CM

## 2018-06-19 DIAGNOSIS — I10 HYPERTENSION, UNSPECIFIED TYPE: ICD-10-CM

## 2018-06-19 DIAGNOSIS — I44.7 LEFT BUNDLE BRANCH BLOCK (LBBB): ICD-10-CM

## 2018-06-19 DIAGNOSIS — I24.9 ACS (ACUTE CORONARY SYNDROME) (HCC): ICD-10-CM

## 2018-06-19 DIAGNOSIS — I48.92 ATRIAL FLUTTER, UNSPECIFIED TYPE (HCC): ICD-10-CM

## 2018-06-19 DIAGNOSIS — Z91.89 AT RISK FOR ACUTE ISCHEMIC CARDIAC EVENT: ICD-10-CM

## 2018-06-19 DIAGNOSIS — Z95.1 HX OF CABG: ICD-10-CM

## 2018-06-19 DIAGNOSIS — E78.2 MIXED HYPERLIPIDEMIA: ICD-10-CM

## 2018-06-19 DIAGNOSIS — E11.8 TYPE 2 DIABETES MELLITUS WITH COMPLICATION, WITH LONG-TERM CURRENT USE OF INSULIN (HCC): ICD-10-CM

## 2018-06-19 DIAGNOSIS — R06.00 DYSPNEA ON EXERTION: ICD-10-CM

## 2018-06-19 DIAGNOSIS — I25.10 CORONARY ARTERY DISEASE INVOLVING NATIVE CORONARY ARTERY OF NATIVE HEART WITHOUT ANGINA PECTORIS: ICD-10-CM

## 2018-06-19 DIAGNOSIS — I10 ESSENTIAL HYPERTENSION: ICD-10-CM

## 2018-06-19 DIAGNOSIS — R00.1 BRADYCARDIA: ICD-10-CM

## 2018-06-19 DIAGNOSIS — I27.20 PULMONARY HYPERTENSION (HCC): ICD-10-CM

## 2018-06-19 DIAGNOSIS — R30.0 DYSURIA: ICD-10-CM

## 2018-06-19 LAB
ALBUMIN SERPL BCP-MCNC: 3.5 G/DL (ref 3.5–5)
ALP SERPL-CCNC: 62 U/L (ref 46–116)
ALT SERPL W P-5'-P-CCNC: 20 U/L (ref 12–78)
ANION GAP SERPL CALCULATED.3IONS-SCNC: 8 MMOL/L (ref 4–13)
AST SERPL W P-5'-P-CCNC: 20 U/L (ref 5–45)
BASOPHILS # BLD AUTO: 0.1 THOUSANDS/ΜL (ref 0–0.1)
BASOPHILS NFR BLD AUTO: 1 % (ref 0–1)
BILIRUB SERPL-MCNC: 0.4 MG/DL (ref 0.2–1)
BUN SERPL-MCNC: 24 MG/DL (ref 5–25)
CALCIUM SERPL-MCNC: 8.2 MG/DL (ref 8.3–10.1)
CHLORIDE SERPL-SCNC: 102 MMOL/L (ref 100–108)
CO2 SERPL-SCNC: 26 MMOL/L (ref 21–32)
CREAT SERPL-MCNC: 1.09 MG/DL (ref 0.6–1.3)
EOSINOPHIL # BLD AUTO: 0.93 THOUSAND/ΜL (ref 0–0.61)
EOSINOPHIL NFR BLD AUTO: 9 % (ref 0–6)
ERYTHROCYTE [DISTWIDTH] IN BLOOD BY AUTOMATED COUNT: 21.6 % (ref 11.6–15.1)
GFR SERPL CREATININE-BSD FRML MDRD: 48 ML/MIN/1.73SQ M
GLUCOSE SERPL-MCNC: 143 MG/DL (ref 65–140)
GLUCOSE SERPL-MCNC: 144 MG/DL (ref 65–140)
GLUCOSE SERPL-MCNC: 90 MG/DL (ref 65–140)
HCT VFR BLD AUTO: 33.1 % (ref 34.8–46.1)
HGB BLD-MCNC: 9.8 G/DL (ref 11.5–15.4)
IMM GRANULOCYTES # BLD AUTO: 0.07 THOUSAND/UL (ref 0–0.2)
IMM GRANULOCYTES NFR BLD AUTO: 1 % (ref 0–2)
LYMPHOCYTES # BLD AUTO: 2.15 THOUSANDS/ΜL (ref 0.6–4.47)
LYMPHOCYTES NFR BLD AUTO: 20 % (ref 14–44)
MAGNESIUM SERPL-MCNC: 1.1 MG/DL (ref 1.6–2.6)
MCH RBC QN AUTO: 21.8 PG (ref 26.8–34.3)
MCHC RBC AUTO-ENTMCNC: 29.6 G/DL (ref 31.4–37.4)
MCV RBC AUTO: 74 FL (ref 82–98)
MONOCYTES # BLD AUTO: 1.05 THOUSAND/ΜL (ref 0.17–1.22)
MONOCYTES NFR BLD AUTO: 10 % (ref 4–12)
NEUTROPHILS # BLD AUTO: 6.58 THOUSANDS/ΜL (ref 1.85–7.62)
NEUTS SEG NFR BLD AUTO: 59 % (ref 43–75)
NRBC BLD AUTO-RTO: 0 /100 WBCS
PLATELET # BLD AUTO: 295 THOUSANDS/UL (ref 149–390)
PMV BLD AUTO: 10.5 FL (ref 8.9–12.7)
POTASSIUM SERPL-SCNC: 4.6 MMOL/L (ref 3.5–5.3)
PROT SERPL-MCNC: 7.8 G/DL (ref 6.4–8.2)
RBC # BLD AUTO: 4.5 MILLION/UL (ref 3.81–5.12)
SODIUM SERPL-SCNC: 136 MMOL/L (ref 136–145)
TROPONIN I SERPL-MCNC: <0.02 NG/ML
TROPONIN I SERPL-MCNC: <0.02 NG/ML
TSH SERPL DL<=0.05 MIU/L-ACNC: 1.52 UIU/ML (ref 0.36–3.74)
WBC # BLD AUTO: 10.88 THOUSAND/UL (ref 4.31–10.16)

## 2018-06-19 PROCEDURE — 84484 ASSAY OF TROPONIN QUANT: CPT | Performed by: EMERGENCY MEDICINE

## 2018-06-19 PROCEDURE — 99285 EMERGENCY DEPT VISIT HI MDM: CPT

## 2018-06-19 PROCEDURE — 84484 ASSAY OF TROPONIN QUANT: CPT | Performed by: INTERNAL MEDICINE

## 2018-06-19 PROCEDURE — 85025 COMPLETE CBC W/AUTO DIFF WBC: CPT | Performed by: EMERGENCY MEDICINE

## 2018-06-19 PROCEDURE — 96374 THER/PROPH/DIAG INJ IV PUSH: CPT

## 2018-06-19 PROCEDURE — 36415 COLL VENOUS BLD VENIPUNCTURE: CPT | Performed by: EMERGENCY MEDICINE

## 2018-06-19 PROCEDURE — 93000 ELECTROCARDIOGRAM COMPLETE: CPT | Performed by: NURSE PRACTITIONER

## 2018-06-19 PROCEDURE — 83735 ASSAY OF MAGNESIUM: CPT | Performed by: EMERGENCY MEDICINE

## 2018-06-19 PROCEDURE — 93005 ELECTROCARDIOGRAM TRACING: CPT

## 2018-06-19 PROCEDURE — 80053 COMPREHEN METABOLIC PANEL: CPT | Performed by: EMERGENCY MEDICINE

## 2018-06-19 PROCEDURE — 99214 OFFICE O/P EST MOD 30 MIN: CPT | Performed by: NURSE PRACTITIONER

## 2018-06-19 PROCEDURE — 82948 REAGENT STRIP/BLOOD GLUCOSE: CPT

## 2018-06-19 PROCEDURE — 84443 ASSAY THYROID STIM HORMONE: CPT | Performed by: EMERGENCY MEDICINE

## 2018-06-19 PROCEDURE — 71046 X-RAY EXAM CHEST 2 VIEWS: CPT

## 2018-06-19 RX ORDER — LORATADINE 10 MG/1
10 TABLET ORAL DAILY
Status: DISCONTINUED | OUTPATIENT
Start: 2018-06-20 | End: 2018-06-21 | Stop reason: HOSPADM

## 2018-06-19 RX ORDER — AMOXICILLIN 500 MG/1
500 CAPSULE ORAL EVERY 8 HOURS SCHEDULED
Status: COMPLETED | OUTPATIENT
Start: 2018-06-19 | End: 2018-06-20

## 2018-06-19 RX ORDER — MELATONIN
1000 2 TIMES DAILY
Status: DISCONTINUED | OUTPATIENT
Start: 2018-06-19 | End: 2018-06-21 | Stop reason: HOSPADM

## 2018-06-19 RX ORDER — PANTOPRAZOLE SODIUM 40 MG/1
40 TABLET, DELAYED RELEASE ORAL
Status: DISCONTINUED | OUTPATIENT
Start: 2018-06-20 | End: 2018-06-21 | Stop reason: HOSPADM

## 2018-06-19 RX ORDER — INSULIN GLARGINE 100 [IU]/ML
20 INJECTION, SOLUTION SUBCUTANEOUS 2 TIMES DAILY
Status: DISCONTINUED | OUTPATIENT
Start: 2018-06-19 | End: 2018-06-21 | Stop reason: HOSPADM

## 2018-06-19 RX ORDER — MAGNESIUM OXIDE 400 MG/1
1 TABLET ORAL 2 TIMES DAILY
Refills: 1 | COMMUNITY
Start: 2018-06-15 | End: 2018-06-19

## 2018-06-19 RX ORDER — ASPIRIN 81 MG/1
81 TABLET, CHEWABLE ORAL DAILY
Status: DISCONTINUED | OUTPATIENT
Start: 2018-06-20 | End: 2018-06-21 | Stop reason: HOSPADM

## 2018-06-19 RX ORDER — ATORVASTATIN CALCIUM 80 MG/1
80 TABLET, FILM COATED ORAL DAILY
Status: DISCONTINUED | OUTPATIENT
Start: 2018-06-20 | End: 2018-06-21 | Stop reason: HOSPADM

## 2018-06-19 RX ORDER — HYDRALAZINE HYDROCHLORIDE 20 MG/ML
5 INJECTION INTRAMUSCULAR; INTRAVENOUS EVERY 4 HOURS PRN
Status: DISCONTINUED | OUTPATIENT
Start: 2018-06-19 | End: 2018-06-21 | Stop reason: HOSPADM

## 2018-06-19 RX ORDER — ACETAMINOPHEN 325 MG/1
650 TABLET ORAL EVERY 6 HOURS PRN
Status: DISCONTINUED | OUTPATIENT
Start: 2018-06-19 | End: 2018-06-21 | Stop reason: HOSPADM

## 2018-06-19 RX ORDER — AMIODARONE HYDROCHLORIDE 200 MG/1
200 TABLET ORAL 2 TIMES DAILY
Qty: 60 TABLET | Refills: 3 | Status: SHIPPED | OUTPATIENT
Start: 2018-06-19

## 2018-06-19 RX ORDER — MAGNESIUM SULFATE HEPTAHYDRATE 40 MG/ML
2 INJECTION, SOLUTION INTRAVENOUS ONCE
Status: COMPLETED | OUTPATIENT
Start: 2018-06-19 | End: 2018-06-20

## 2018-06-19 RX ORDER — LANOLIN ALCOHOL/MO/W.PET/CERES
3 CREAM (GRAM) TOPICAL
Status: DISCONTINUED | OUTPATIENT
Start: 2018-06-19 | End: 2018-06-21 | Stop reason: HOSPADM

## 2018-06-19 RX ORDER — MAGNESIUM SULFATE HEPTAHYDRATE 40 MG/ML
2 INJECTION, SOLUTION INTRAVENOUS ONCE
Status: DISCONTINUED | OUTPATIENT
Start: 2018-06-19 | End: 2018-06-19

## 2018-06-19 RX ORDER — FLUTICASONE PROPIONATE 110 UG/1
1 AEROSOL, METERED RESPIRATORY (INHALATION)
Status: DISCONTINUED | OUTPATIENT
Start: 2018-06-19 | End: 2018-06-21 | Stop reason: HOSPADM

## 2018-06-19 RX ORDER — 0.9 % SODIUM CHLORIDE 0.9 %
3 VIAL (ML) INJECTION AS NEEDED
Status: DISCONTINUED | OUTPATIENT
Start: 2018-06-19 | End: 2018-06-21 | Stop reason: HOSPADM

## 2018-06-19 RX ORDER — MAGNESIUM SULFATE 1 G/100ML
1 INJECTION INTRAVENOUS ONCE
Status: COMPLETED | OUTPATIENT
Start: 2018-06-19 | End: 2018-06-19

## 2018-06-19 RX ORDER — FERROUS SULFATE 325(65) MG
325 TABLET ORAL
Status: DISCONTINUED | OUTPATIENT
Start: 2018-06-20 | End: 2018-06-21 | Stop reason: HOSPADM

## 2018-06-19 RX ORDER — CALCIUM CARBONATE 200(500)MG
1000 TABLET,CHEWABLE ORAL 3 TIMES DAILY
Status: DISCONTINUED | OUTPATIENT
Start: 2018-06-19 | End: 2018-06-21 | Stop reason: HOSPADM

## 2018-06-19 RX ORDER — LEVETIRACETAM 750 MG/1
750 TABLET ORAL EVERY 12 HOURS SCHEDULED
Status: DISCONTINUED | OUTPATIENT
Start: 2018-06-19 | End: 2018-06-21 | Stop reason: HOSPADM

## 2018-06-19 RX ORDER — ALBUTEROL SULFATE 90 UG/1
2 AEROSOL, METERED RESPIRATORY (INHALATION) EVERY 4 HOURS PRN
Status: DISCONTINUED | OUTPATIENT
Start: 2018-06-19 | End: 2018-06-21 | Stop reason: HOSPADM

## 2018-06-19 RX ORDER — POLYETHYLENE GLYCOL 3350 17 G/17G
17 POWDER, FOR SOLUTION ORAL DAILY
Status: DISCONTINUED | OUTPATIENT
Start: 2018-06-20 | End: 2018-06-21 | Stop reason: HOSPADM

## 2018-06-19 RX ORDER — FLUTICASONE PROPIONATE 50 MCG
1 SPRAY, SUSPENSION (ML) NASAL DAILY
Status: DISCONTINUED | OUTPATIENT
Start: 2018-06-20 | End: 2018-06-21 | Stop reason: HOSPADM

## 2018-06-19 RX ORDER — LEVOTHYROXINE SODIUM 0.1 MG/1
100 TABLET ORAL
Status: DISCONTINUED | OUTPATIENT
Start: 2018-06-20 | End: 2018-06-21 | Stop reason: HOSPADM

## 2018-06-19 RX ORDER — NITROGLYCERIN 0.4 MG/1
0.4 TABLET SUBLINGUAL
Status: DISCONTINUED | OUTPATIENT
Start: 2018-06-19 | End: 2018-06-21 | Stop reason: HOSPADM

## 2018-06-19 RX ORDER — FUROSEMIDE 40 MG/1
40 TABLET ORAL DAILY
Status: DISCONTINUED | OUTPATIENT
Start: 2018-06-20 | End: 2018-06-21 | Stop reason: HOSPADM

## 2018-06-19 RX ORDER — POTASSIUM CHLORIDE 750 MG/1
10 TABLET, EXTENDED RELEASE ORAL DAILY
Status: DISCONTINUED | OUTPATIENT
Start: 2018-06-20 | End: 2018-06-21 | Stop reason: HOSPADM

## 2018-06-19 RX ORDER — LISINOPRIL 5 MG/1
5 TABLET ORAL DAILY
Status: DISCONTINUED | OUTPATIENT
Start: 2018-06-20 | End: 2018-06-20

## 2018-06-19 RX ADMIN — INSULIN GLARGINE 20 UNITS: 100 INJECTION, SOLUTION SUBCUTANEOUS at 22:45

## 2018-06-19 RX ADMIN — LEVETIRACETAM 750 MG: 750 TABLET ORAL at 22:44

## 2018-06-19 RX ADMIN — Medication 1000 MG: at 22:44

## 2018-06-19 RX ADMIN — CHOLECALCIFEROL TAB 25 MCG (1000 UNIT) 1000 UNITS: 25 TAB at 22:43

## 2018-06-19 RX ADMIN — MAGNESIUM SULFATE HEPTAHYDRATE 1 G: 1 INJECTION, SOLUTION INTRAVENOUS at 18:37

## 2018-06-19 RX ADMIN — Medication 400 MG: at 22:43

## 2018-06-19 RX ADMIN — MAGNESIUM SULFATE HEPTAHYDRATE 2 G: 40 INJECTION, SOLUTION INTRAVENOUS at 23:00

## 2018-06-19 NOTE — PATIENT INSTRUCTIONS
Dr Rosas Andrade, who is very familiar with medical history, feels very strongly that this patient go directly to the emergency department for full evaluation given significance of symptoms and EKG  Patient given a copy of the EKG from the office to bring with her

## 2018-06-19 NOTE — PROGRESS NOTES
Assessment/Plan:    No problem-specific Assessment & Plan notes found for this encounter  Diagnoses and all orders for this visit:    Chest pain at rest  -     POCT ECG    Dyspnea on exertion    Dizziness    Other orders  -     magnesium oxide (MAG-OX) 400 mg tablet; Take 1 tablet by mouth 2 (two) times a day      EKG is reviewed and case was discussed with Dr Carina Alexander  Given patient's significant history and symptoms, the patient was referred to the emergency department for full evaluation  The patient requested to be transported via ambulance  The ambulance was called for transport to [address] to follow-up post hospitalization  Subjective:      Patient ID: Sera Yun is a [de-identified] y o  female  Patient presents for an acute visit  She reports that she had an episode of chest pain this morning that lasted about 2 min and radiated down to her left arm  She has a very significant cardiac history with multiple MI's and an EF of 35%  The patient reports increased shortness breath for the past 2 weeks and having to prop herself up with more pillows than usual   His she started Lasix 2 weeks ago and her weight has decreased with daily weight checks  She denies pain radiating from her daughter abdomen and denies any current chest pain  She denies chills or diaphoresis at this time  She has history of chronic nausea, for which she takes Reglan  Dizziness   Associated symptoms include chest pain, fatigue, nausea and weakness  Pertinent negatives include no abdominal pain, chills, congestion, coughing, diaphoresis, fever, numbness, sore throat or vomiting  Chest Pain    Associated symptoms include dizziness, nausea, shortness of breath and weakness  Pertinent negatives include no abdominal pain, cough, diaphoresis, fever, numbness, palpitations or vomiting  Shortness of Breath   Associated symptoms include chest pain   Pertinent negatives include no abdominal pain, fever, leg swelling, rhinorrhea, sore throat, vomiting or wheezing  The following portions of the patient's history were reviewed and updated as appropriate: allergies, current medications, past family history, past medical history, past social history, past surgical history and problem list     Review of Systems   Constitutional: Positive for fatigue and unexpected weight change  Negative for chills, diaphoresis and fever  HENT: Negative for congestion, rhinorrhea and sore throat  Eyes: Negative for pain  Respiratory: Positive for shortness of breath  Negative for apnea, cough and wheezing  Cardiovascular: Positive for chest pain  Negative for palpitations and leg swelling  Gastrointestinal: Positive for nausea  Negative for abdominal distention, abdominal pain, blood in stool, diarrhea and vomiting  Genitourinary: Negative for dysuria  Neurological: Positive for dizziness, weakness and light-headedness  Negative for syncope, speech difficulty (+history of TIAs) and numbness  Psychiatric/Behavioral: The patient is not nervous/anxious            Past Medical History:   Diagnosis Date    Altered mental status     Anticoagulated     Coumadin for Aflutter    Asthma     Atrial flutter (HCC)     maintained on coumadin anticoag    CAD (coronary artery disease)     Candidiasis     Carotid stenosis, bilateral     Cataract     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) (HCC)     Chronic fatigue syndrome     Chronic low back pain     Concussion w loss of consciousness of unsp duration, init     Coronary artery disease     [facility]    Diabetes mellitus ([facility] )     type 2, insulin dependent    DJD (degenerative joint disease)     GERD (gastroesophageal reflux disease)     Herpes zoster     HLD (hyperlipidemia)     HTN (hypertension)     Hyperlipidemia     Hypothyroidism     Irritable bowel syndrome     Lumbar radiculopathy     Lyme disease     Dx in hospital 8/2011    Lyme disease     [facility] infarction[facility]     Migraines     Muscle spasm     Non-neoplastic nevus     Nontoxic multinodular goiter     OA (osteoarthritis)     Osteoarthritis     Other chronic pain     [facility]peripheral artery [facility]) [facility]     Palpitations     Pseudogout     Seizures (HCC)     Spinal stenosis     Transient cerebral ischemia     Trigger ring finger     Viral gastroenteritis          Current Outpatient Prescriptions:     acetaminophen (TYLENOL) 650 mg CR tablet, Take 1 tablet (650 mg total) by mouth every 8 (eight) hours as needed for mild pain Do not take in conjunction with Percocet , Disp: 90 tablet, Rfl: 0    albuterol (VENTOLIN HFA) 90 mcg/act inhaler, Inhale 108 mcg 2 (two) times a day as needed 2 puffs bid PRN, Disp: , Rfl:     amiodarone 200 mg tablet, Take 1 tablet (200 mg total) by mouth 2 (two) times a day, Disp: 60 tablet, Rfl: 3    ampicillin (PRINCIPEN) 500 mg capsule, Take 1 capsule (500 mg total) by mouth 4 (four) times a day for 5 days, Disp: 20 capsule, Rfl: 0    aspirin 81 MG tablet, Take 81 mg by mouth daily, Disp: , Rfl:     atorvastatin (LIPITOR) 80 mg tablet, Take 1 tablet (80 mg total) by mouth daily, Disp: 30 tablet, Rfl: 2    BANOPHEN 50 MG capsule, TAKE ONE CAPSULE NIGHT PRIOR TO PROCEDURE AND MORNING OF , Disp: , Rfl: 0    calcium carbonate (TUMS) 500 mg chewable tablet, Chew 2 tablets 3 (three) times a day  , Disp: , Rfl:     cholecalciferol (VITAMIN D3) 1,000 units tablet, Take 1,000 Units by mouth 2 (two) times a day  , Disp: , Rfl:     ferrous sulfate 325 (65 Fe) mg tablet, Take 1 tablet (325 mg total) by mouth daily with breakfast, Disp: 90 tablet, Rfl: 1    fluticasone (FLONASE) 50 mcg/act nasal spray, 1 spray into each nostril daily, Disp: , Rfl:     furosemide (LASIX) 40 mg tablet, Take 1 tablet (40 mg total) by mouth daily, Disp: 90 tablet, Rfl: 3    glucose blood test strip, 1 each by Other route 4 (four) times a day (before meals and at bedtime) Use as instructed, Disp: , Rfl:     LANTUS 100 UNIT/ML subcutaneous injection, Inject 20 Units under the skin 2 (two) times a day, Disp: 10 mL, Rfl: 0    levETIRAcetam (KEPPRA) 750 mg tablet, Take 1 tablet (750 mg total) by mouth every 12 (twelve) hours, Disp: 60 tablet, Rfl: 11    levothyroxine 100 mcg tablet, Take 1 tablet (100 mcg total) by mouth daily, Disp: 90 tablet, Rfl: 1    lisinopril (ZESTRIL) 5 mg tablet, Take 1 tablet (5 mg total) by mouth daily, Disp: 90 tablet, Rfl: 3    loratadine (CLARITIN) 10 mg tablet, Take 1 tablet (10 mg total) by mouth daily, Disp: 90 tablet, Rfl: 1    magnesium oxide (MAG-OX) 400 mg tablet, Take 1 tablet by mouth 2 (two) times a day, Disp: , Rfl: 1    magnesium oxide (MAG-OX) 400 mg, Take 1 tablet (400 mg total) by mouth 2 (two) times a day, Disp: 180 tablet, Rfl: 1    metFORMIN (GLUCOPHAGE) 1000 MG tablet, 1,000 mg 2 (two) times a day, Disp: , Rfl:     metoclopramide (REGLAN) 10 mg tablet, Take 0 5 tablets (5 mg total) by mouth every 6 (six) hours as needed (Nausea and vomiting), Disp: 30 tablet, Rfl: 1    metoprolol tartrate (LOPRESSOR) 100 mg tablet, Take 1 tablet (100 mg total) by mouth every 12 (twelve) hours, Disp: , Rfl: 0    Mometasone Furoate (ASMANEX HFA) 100 MCG/ACT AERO, Inhale 2 puffs once daily, Disp: 1 Inhaler, Rfl: 5    nitroglycerin (NITROSTAT) 0 4 mg SL tablet, Place 0 4 mg under the tongue every 3 (three) minutes as needed, Disp: , Rfl:     pantoprazole (PROTONIX) 40 mg tablet, Take 40 mg by mouth daily, Disp: , Rfl:     phenazopyridine (PYRIDIUM) 200 mg tablet, Take 1 tablet (200 mg total) by mouth 3 (three) times a day with meals, Disp: 10 tablet, Rfl: 0    polyethylene glycol (MIRALAX) 17 g packet, Take 17 g by mouth daily, Disp: , Rfl:     potassium chloride (K-DUR,KLOR-CON) 10 mEq tablet, Take 1 tablet (10 mEq total) by mouth daily, Disp: 30 tablet, Rfl: 1   rivaroxaban (XARELTO) 20 mg tablet, Take 1 tablet (20 mg total) by mouth daily, Disp: 90 tablet, Rfl: 0    Allergies   Allergen Reactions    Other Chest Pain     IVP-listed as "chest pain" in previous chart, patient stated this occurred "a long time ago" but does not remember exactly occurred     Cephalosporins Chest Pain    Contrast [Iodinated Diagnostic Agents] Other (See Comments)     Flash pulm edema    Doxycycline Chest Pain    Levaquin [Levofloxacin] Chest Pain    Ondansetron Chest Pain     Prolonged QT    Toradol [Ketorolac Tromethamine] Chest Pain       Social History   Past Surgical History:   Procedure Laterality Date    APPENDECTOMY      ARTERIOGRAM  12/19/2017    CARDIAC CATHETERIZATION      CHOLECYSTECTOMY      COLONOSCOPY      CORONARY ARTERY BYPASS GRAFT  2004    LUMBAR LAMINECTOMY      AL ECHO TRANSESOPHAG R-T 2D W/PRB IMG ACQUISJ I&R N/A 4/3/2018    Procedure: TRANSESOPHAGEAL ECHOCARDIOGRAM (ROBB); Surgeon: Avery Romero DO;  Location: [facility];  Service: Cardiac Surgery    AL REPLACE AORTIC VALVE OPENFEMORAL ARTERY APPROACH N/A 4/3/2018    Procedure: REPLACEMENT AORTIC VALVE TRANSCATHETER (TAVR) TRANSFEMORAL w/ 26MM IVY YEVGENIY S3 VALVE;  Surgeon: Avery Romero DO;  Location: [facility];  Service: Cardiac Surgery    THYROIDECTOMY      TOTAL ABDOMINAL HYSTERECTOMY W/ BILATERAL SALPINGOOPHORECTOMY       Family History   Problem Relation Age of Onset    Cancer Mother     Stroke Mother     Cancer Father     Heart disease Father     Breast cancer Sister     Diabetes Daughter         Passed away January 2017        Objective:  /70 (BP Location: Left arm, Patient Position: Sitting, Cuff Size: Adult)   Pulse (!) 50   Temp 98 1 °F (36 7 °C) (Oral)   Ht 5' 2" (1 575 m)   Wt 71 8 kg (158 lb 6 4 oz)   LMP  (LMP Unknown)   SpO2 96%   BMI 28 97 kg/m²     EKG: Irregular Bradycardia, rate 52, First degree AV block, LBBB, Left axis deviation        Physical Exam Constitutional: She is oriented to person, place, and time  She appears well-developed and well-nourished  No distress  HENT:   Head: Normocephalic and atraumatic  Right Ear: External ear normal    Left Ear: External ear normal    Mouth/Throat: Oropharynx is clear and moist    Eyes: Conjunctivae are normal  Pupils are equal, round, and reactive to light  Neck: Normal range of motion  Neck supple  No thyromegaly present  Cardiovascular: Regular rhythm  Bradycardia present  Murmur heard  Systolic murmur is present with a grade of 3/6   Pulmonary/Chest: Effort normal and breath sounds normal  She has no wheezes  She has no rales  Abdominal: Soft  Bowel sounds are normal  She exhibits no distension  Musculoskeletal: Normal range of motion  She exhibits no edema  Neurological: She is alert and oriented to person, place, and time  Skin: Skin is warm and dry  Psychiatric: She has a normal mood and affect  Her behavior is normal  Judgment and thought content normal    Vitals reviewed

## 2018-06-19 NOTE — ED PROVIDER NOTES
History  Chief Complaint   Patient presents with    Chest Pain     Pt had chest pain and SOB starting this morning at 0400  Pt was seen at her family doctor and was advised to be seen here in the ED  Pt denies chest pain and SOB at present  HPI       58-year-old female with history of atrial flutter currently on Xarelto, CAD status post CABG many years ago , aortic stenosis status post trans arterial valve replacement, CVA diabetes mellitus, GERD hypertension hyperlipidemia presents with chief complaint of chest pain this morning at 4:00 a m  Pawan Vyas Patient woke up this morning and had "elephant on her chest" type feeling  At its worst pain was a 10/10 and remitted to a 0/10 currently  This pain did not radiate  Patient denied aggravating factors as patient was lying in bed  Patient has prior ACS in her history requiring CABG  Patient states this felt different as it only lasted for 1 hour and remitted without intervention  Patient was visited by nursing services and was found to be bradycardic  Patient does take metoprolol at home and has been taking this as directed  Patient has been feeling more fatigued over last 1 week  Patient states it has been more difficult to walk  Patient uses a walker at home  Patient admits to lightheadedness and dizziness Patient lives alone but has family in the community  Patient denies fever chills rigors headache neck pain neck stiffness palpitations cough hemoptysis abdominal pain nausea vomiting diarrhea constipation urinary symptoms motor weakness numbness tingling  Prior to Admission Medications   Prescriptions Last Dose Informant Patient Reported? Taking?    BANOPHEN 50 MG capsule  Family Member Yes Yes   Sig: TAKE ONE CAPSULE NIGHT PRIOR TO PROCEDURE AND MORNING OF    LANTUS 100 UNIT/ML subcutaneous injection  Family Member No Yes   Sig: Inject 20 Units under the skin 2 (two) times a day   Mometasone Furoate (ASMANEX HFA) 100 MCG/ACT AERO  Family Member No Yes Sig: Inhale 2 puffs once daily   acetaminophen (TYLENOL) 650 mg CR tablet  Family Member No Yes   Sig: Take 1 tablet (650 mg total) by mouth every 8 (eight) hours as needed for mild pain Do not take in conjunction with Percocet     albuterol (VENTOLIN HFA) 90 mcg/act inhaler  Family Member Yes Yes   Sig: Inhale 108 mcg 2 (two) times a day as needed 2 puffs bid PRN   amiodarone 200 mg tablet  Family Member No Yes   Sig: Take 1 tablet (200 mg total) by mouth 2 (two) times a day   ampicillin (PRINCIPEN) 500 mg capsule  Family Member No Yes   Sig: Take 1 capsule (500 mg total) by mouth 4 (four) times a day for 5 days   aspirin 81 MG tablet  Family Member Yes Yes   Sig: Take 81 mg by mouth daily   atorvastatin (LIPITOR) 80 mg tablet  Family Member No Yes   Sig: Take 1 tablet (80 mg total) by mouth daily   calcium carbonate (TUMS) 500 mg chewable tablet  Family Member Yes Yes   Sig: Chew 2 tablets 3 (three) times a day     cholecalciferol (VITAMIN D3) 1,000 units tablet  Family Member Yes Yes   Sig: Take 1,000 Units by mouth 2 (two) times a day     ferrous sulfate 325 (65 Fe) mg tablet  Family Member No Yes   Sig: Take 1 tablet (325 mg total) by mouth daily with breakfast   fluticasone (FLONASE) 50 mcg/act nasal spray  Family Member Yes Yes   Si spray into each nostril daily   furosemide (LASIX) 40 mg tablet  Family Member No Yes   Sig: Take 1 tablet (40 mg total) by mouth daily   glucose blood test strip  Family Member Yes Yes   Si each by Other route 4 (four) times a day (before meals and at bedtime) Use as instructed   levETIRAcetam (KEPPRA) 750 mg tablet  Family Member No Yes   Sig: Take 1 tablet (750 mg total) by mouth every 12 (twelve) hours   levothyroxine 100 mcg tablet  Family Member No Yes   Sig: Take 1 tablet (100 mcg total) by mouth daily   lisinopril (ZESTRIL) 5 mg tablet  Family Member No Yes   Sig: Take 1 tablet (5 mg total) by mouth daily   loratadine (CLARITIN) 10 mg tablet  Family Member No Yes Sig: Take 1 tablet (10 mg total) by mouth daily   magnesium oxide (MAG-OX) 400 mg  Family Member No Yes   Sig: Take 1 tablet (400 mg total) by mouth 2 (two) times a day   metFORMIN (GLUCOPHAGE) 1000 MG tablet  Family Member Yes Yes   Si,000 mg 2 (two) times a day   metoclopramide (REGLAN) 10 mg tablet  Family Member No Yes   Sig: Take 0 5 tablets (5 mg total) by mouth every 6 (six) hours as needed (Nausea and vomiting)   metoprolol tartrate (LOPRESSOR) 100 mg tablet  Family Member No Yes   Sig: Take 1 tablet (100 mg total) by mouth every 12 (twelve) hours   nitroglycerin (NITROSTAT) 0 4 mg SL tablet  Family Member Yes Yes   Sig: Place 0 4 mg under the tongue every 3 (three) minutes as needed   pantoprazole (PROTONIX) 40 mg tablet  Family Member Yes Yes   Sig: Take 40 mg by mouth daily   phenazopyridine (PYRIDIUM) 200 mg tablet Unknown at Unknown time Family Member No No   Sig: Take 1 tablet (200 mg total) by mouth 3 (three) times a day with meals   polyethylene glycol (MIRALAX) 17 g packet  Family Member Yes Yes   Sig: Take 17 g by mouth daily   potassium chloride (K-DUR,KLOR-CON) 10 mEq tablet  Family Member No Yes   Sig: Take 1 tablet (10 mEq total) by mouth daily   rivaroxaban (XARELTO) 20 mg tablet  Family Member No Yes   Sig: Take 1 tablet (20 mg total) by mouth daily      Facility-Administered Medications: None       Past Medical History:   Diagnosis Date    Altered mental status     Anticoagulated     Coumadin for Aflutter    Asthma     Atrial flutter (HCC)     maintained on coumadin anticoag    CAD (coronary artery disease)     Candidiasis     Carotid stenosis, bilateral     Cataract     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) ([facility]     Chronic fatigue syndrome     Chronic low back pain     Concussion w loss of consciousness of unsp duration, init     Coronary artery disease     [facility] 2018    Diabetes mellitus ([facility] ) type 2, insulin dependent    DJD (degenerative joint disease)     GERD (gastroesophageal reflux disease)     Herpes zoster     HLD (hyperlipidemia)     HTN (hypertension)     Hyperlipidemia     Hypothyroidism     Irritable bowel syndrome     Lumbar radiculopathy     Lyme disease     Dx in hospital 8/2011    Lyme disease     [facility] infarction[facility] )     Migraines     Muscle spasm     Non-neoplastic nevus     Nontoxic multinodular goiter     OA (osteoarthritis)     Osteoarthritis     Other chronic pain     PAD (peripheral artery disease) (HCC)     Palpitations     Pseudogout     Seizures ([facility] )     Spinal stenosis     Transient cerebral ischemia     Trigger ring finger     Viral gastroenteritis        Past Surgical History:   Procedure Laterality Date    APPENDECTOMY      ARTERIOGRAM  12/19/2017    CARDIAC CATHETERIZATION      CHOLECYSTECTOMY      COLONOSCOPY      CORONARY ARTERY BYPASS GRAFT  2004    LUMBAR LAMINECTOMY      WY ECHO TRANSESOPHAG R-T 2D W/PRB IMG ACQUISJ I&R N/A 4/3/2018    Procedure: TRANSESOPHAGEAL ECHOCARDIOGRAM (ROBB); Surgeon: Davis Grady DO;  [facility];  Service: Cardiac Surgery    WY REPLACE AORTIC VALVE OPENFEMORAL ARTERY APPROACH N/A 4/3/2018    Procedure: REPLACEMENT AORTIC VALVE TRANSCATHETER (TAVR) TRANSFEMORAL w/ 26MM IVY YEVGENIY S3 VALVE;  Surgeon: Davis Grady DO;  Location[facility];  Service: Cardiac Surgery    THYROIDECTOMY      TOTAL ABDOMINAL HYSTERECTOMY W/ BILATERAL SALPINGOOPHORECTOMY         Family History   Problem Relation Age of Onset    Cancer Mother     Stroke Mother     Cancer Father     Heart disease Father     Breast cancer Sister     Diabetes Daughter         Passed away January 2017      I have reviewed and agree with the history as documented      Social History   Substance Use Topics    Smoking status: Never Smoker    Smokeless tobacco: Never Used    Alcohol use No        Review of Systems   Constitutional: Negative for activity change, appetite change, chills, diaphoresis, fatigue, fever and unexpected weight change  HENT: Negative for congestion, ear discharge, ear pain, facial swelling, hearing loss, nosebleeds, postnasal drip, rhinorrhea, sinus pressure, sneezing, sore throat and tinnitus  Eyes: Negative for photophobia, pain, redness, itching and visual disturbance  Respiratory: Negative for cough, chest tightness, shortness of breath, wheezing and stridor  Cardiovascular: Positive for chest pain  Negative for palpitations and leg swelling  Gastrointestinal: Negative for abdominal distention, abdominal pain, anal bleeding, blood in stool, constipation, diarrhea, nausea and vomiting  Endocrine: Negative for polydipsia, polyphagia and polyuria  Genitourinary: Negative for decreased urine volume, difficulty urinating, dysuria, enuresis, flank pain, frequency, hematuria, menstrual problem, urgency, vaginal bleeding, vaginal discharge and vaginal pain  Musculoskeletal: Negative for arthralgias, back pain, gait problem, joint swelling, myalgias, neck pain and neck stiffness  Skin: Negative for rash and wound  Allergic/Immunologic: Negative for environmental allergies, food allergies and immunocompromised state  Neurological: Positive for light-headedness  Negative for dizziness, tremors, seizures, syncope, facial asymmetry, speech difficulty, weakness, numbness and headaches  Hematological: Negative for adenopathy  Psychiatric/Behavioral: Negative for agitation, behavioral problems, confusion, dysphoric mood, hallucinations, self-injury, sleep disturbance and suicidal ideas  The patient is not nervous/anxious and is not hyperactive          Physical Exam  ED Triage Vitals   Temperature Pulse Respirations Blood Pressure SpO2   06/19/18 1703 06/19/18 1703 06/19/18 1703 06/19/18 1703 06/19/18 1703   98 1 °F (36 7 °C) (!) 49 18 164/79 97 %      Temp Source Heart Rate Source Patient Position - Orthostatic VS BP Location FiO2 (%)   06/19/18 1703 -- 06/19/18 2012 06/19/18 2012 --   Oral  Lying Left arm       Pain Score       06/19/18 1703       No Pain           Orthostatic Vital Signs  Vitals:    06/19/18 1815 06/19/18 1834 06/19/18 2012 06/19/18 2031   BP: 146/68 131/54 (!) 180/73 140/65   Pulse: (!) 46 (!) 47 55    Patient Position - Orthostatic VS:   Lying Lying       Physical Exam   Constitutional: She is oriented to person, place, and time  She appears well-developed and well-nourished  No distress  HENT:   Head: Normocephalic and atraumatic  Right Ear: External ear normal    Left Ear: External ear normal    Nose: Nose normal    Mouth/Throat: Oropharynx is clear and moist  No oropharyngeal exudate  Eyes: Conjunctivae and EOM are normal  Pupils are equal, round, and reactive to light  Right eye exhibits no discharge  Left eye exhibits no discharge  No scleral icterus  Neck: Normal range of motion  Neck supple  No JVD present  No tracheal deviation present  No thyromegaly present  Cardiovascular: Regular rhythm, S1 normal, S2 normal, normal heart sounds and intact distal pulses  Bradycardia present  No murmur heard  Pulses:       Carotid pulses are 2+ on the right side, and 2+ on the left side  Radial pulses are 2+ on the right side, and 2+ on the left side  Dorsalis pedis pulses are 2+ on the right side, and 2+ on the left side  Posterior tibial pulses are 2+ on the right side, and 2+ on the left side  Pulmonary/Chest: Effort normal and breath sounds normal  No stridor  No respiratory distress  She has no wheezes  Abdominal: Soft  Bowel sounds are normal  She exhibits no distension and no mass  There is no tenderness  There is no rigidity, no rebound, no guarding, no CVA tenderness, no tenderness at McBurney's point and negative Eli's sign  No hernia  Musculoskeletal: Normal range of motion   She exhibits no edema, tenderness or deformity  Homans sign negative bilaterally no calf at tenderness or erythema  Calves are symmetrical in diameter   Lymphadenopathy:     She has no cervical adenopathy  Neurological: She is alert and oriented to person, place, and time  She displays normal reflexes  No cranial nerve deficit  She exhibits normal muscle tone  Skin: Skin is warm and dry  No rash noted  She is not diaphoretic  No erythema  Psychiatric: She has a normal mood and affect  Her behavior is normal  Judgment and thought content normal    Nursing note and vitals reviewed  ED Medications  Medications   sodium chloride (PF) 0 9 % injection 3 mL (not administered)   hydrALAZINE (APRESOLINE) injection 5 mg (not administered)   magnesium sulfate IVPB (premix) SOLN 1 g (0 g Intravenous Stopped 6/19/18 1925)       Diagnostic Studies  Results Reviewed     Procedure Component Value Units Date/Time    Comprehensive metabolic panel [[id number]]  (Abnormal) Collected:  06/19/18 1717    Lab Status:  Final result Specimen:  Blood from Arm, Left Updated:  06/19/18 1800     Sodium 136 mmol/L      Potassium 4 6 mmol/L      Chloride 102 mmol/L      CO2 26 mmol/L      Anion Gap 8 mmol/L      BUN 24 mg/dL      Creatinine 1 09 mg/dL      Glucose 143 (H) mg/dL      Calcium 8 2 (L) mg/dL      AST 20 U/L      ALT 20 U/L      Alkaline Phosphatase 62 U/L      Total Protein 7 8 g/dL      Albumin 3 5 g/dL      Total Bilirubin 0 40 mg/dL      eGFR 48 ml/min/1 73sq m     Narrative:         National Kidney Disease Education Program recommendations are as follows:  GFR calculation is accurate only with a steady state creatinine  Chronic Kidney disease less than 60 ml/min/1 73 sq  meters  Kidney failure less than 15 ml/min/1 73 sq  meters      TSH, 3rd generation with Free T4 reflex [[id number]]  (Normal) Collected:  06/19/18 1717    Lab Status:  Final result Specimen:  Blood from Arm, Left Updated:  06/19/18 1800     TSH 3RD GENERATON 1 520 uIU/mL     Narrative: Patients undergoing fluorescein dye angiography may retain small amounts of fluorescein in the body for 48-72 hours post procedure  Samples containing fluorescein can produce falsely depressed TSH values  If the patient had this procedure,a specimen should be resubmitted post fluorescein clearance  The recommended reference ranges for TSH during pregnancy are as follows:  First trimester 0 1 to 2 5 uIU/mL  Second trimester  0 2 to 3 0 uIU/mL  Third trimester 0 3 to 3 0 uIU/m      Magnesium [[id number]]  (Abnormal) Collected:  06/19/18 1717    Lab Status:  Final result Specimen:  Blood from Arm, Left Updated:  06/19/18 1800     Magnesium 1 1 (L) mg/dL     Troponin I [[id number]]  (Normal) Collected:  06/19/18 1717    Lab Status:  Final result Specimen:  Blood from Arm, Left Updated:  06/19/18 1753     Troponin I <0 02 ng/mL     CBC and differential [[id number]]  (Abnormal) Collected:  06/19/18 1717    Lab Status:  Final result Specimen:  Blood from Arm, Left Updated:  06/19/18 1740     WBC 10 88 (H) Thousand/uL      RBC 4 50 Million/uL      Hemoglobin 9 8 (L) g/dL      Hematocrit 33 1 (L) %      MCV 74 (L) fL      MCH 21 8 (L) pg      MCHC 29 6 (L) g/dL      RDW 21 6 (H) %      MPV 10 5 fL      Platelets 171 Thousands/uL      nRBC 0 /100 WBCs      Neutrophils Relative 59 %      Immat GRANS % 1 %      Lymphocytes Relative 20 %      Monocytes Relative 10 %      Eosinophils Relative 9 (H) %      Basophils Relative 1 %      Neutrophils Absolute 6 58 Thousands/µL      Immature Grans Absolute 0 07 Thousand/uL      Lymphocytes Absolute 2 15 Thousands/µL      Monocytes Absolute 1 05 Thousand/µL      Eosinophils Absolute 0 93 (H) Thousand/µL      Basophils Absolute 0 10 Thousands/µL                  X-ray chest 2 views   ED Interpretation by Jennie Hassan DO (06/19 1800)   Cardiomegaly noted, without infiltrate bilaterally      Final Result by Marianne Patrick MD (06/19 2028)      Stable cardiomegaly    No acute cardiopulmonary disease  Workstation performed: [id number]               Procedures  ECG 12 Lead Documentation  Date/Time: 6/19/2018 9:14 PM  Performed by: Jocelyn Garvey  Authorized by: Jocelyn Garvey     Indications / Diagnosis:  Chest pain  ECG reviewed by me, the ED Provider: yes    Patient location:  ED  Previous ECG:     Previous ECG:  Compared to current    Similarity:  Changes noted  Interpretation:     Interpretation: abnormal    Rate:     ECG rate:  49    ECG rate assessment: bradycardic    Rhythm:     Rhythm: sinus rhythm    Ectopy:     Ectopy: none    QRS:     QRS axis:  Right    QRS intervals:  Normal  Conduction:     Conduction: abnormal      Abnormal conduction: incomplete LBBB and 1st degree    ST segments:     ST segments:  Non-specific  T waves:     T waves: non-specific            Phone Consults  ED Phone Contact    ED Course         HEART Risk Score      Most Recent Value   History  1 Filed at: 06/19/2018 1806   ECG  1 Filed at: 06/19/2018 1806   Age  2 Filed at: 06/19/2018 1806   Risk Factors  2 Filed at: 06/19/2018 1806   Troponin  0 Filed at: 06/19/2018 1806   Heart Score Risk Calculator   History  1 Filed at: 06/19/2018 1806   ECG  1 Filed at: 06/19/2018 1806   Age  2 Filed at: 06/19/2018 1806   Risk Factors  2 Filed at: 06/19/2018 1806   Troponin  0 Filed at: 06/19/2018 1806   HEART Score  6 Filed at: 06/19/2018 1806   HEART Score  6 Filed at: 06/19/2018 1806        Identification of Seniors at Risk      Most Recent Value   (ISAR) Identification of Seniors at Risk   Before the illness or injury that brought you to the Emergency, did you need someone to help you on a regular basis? 0 Filed at: 06/19/2018 1705   In the last 24 hours, have you needed more help than usual?  0 Filed at: 06/19/2018 1705   Have you been hospitalized for one or more nights during the past 6 months?   1 Filed at: 06/19/2018 1705   In general, do you see well?  0 Filed at: 06/19/2018 1705   In general, do you have serious problems with your memory? 0 Filed at: 06/19/2018 1705   Do you take more than three different medications every day? 1 Filed at: 06/19/2018 1705   ISAR Score  2 Filed at: 06/19/2018 1705                          MDM  Number of Diagnoses or Management Options  ACS (acute coronary syndrome) ([facility] ): Anemia:   At risk for acute ischemic cardiac event:   Bradycardia:   Chronic combined systolic and diastolic congestive heart failure ([facility] ):   Hx of CABG:   Hypomagnesemia:   Left bundle branch block (LBBB):   Paroxysmal atrial fibrillation [facility]):   Pulmonary hypertension [facility]):   Diagnosis management comments: 78-year-old male with bradycardia and chest pain which remitted without intervention  On exam patient's vital signs show bradycardia into 42 beats per minute  Patient is intermittently lightheaded throughout my conversation  Patient did admit to chest pain  Heart score of 6, known CAD, many cardiac risk factors in establish disease  EKG shows significant interventricular conduction delay, left bundle branch block, no STEMI criteria met  Troponin negative  Patient will be admitted for ACS rule out  Etiology of bradycardia, electrolytes assess, magnesium low, replaced in the emergency department  Possibly medication induced as patient is on beta-blockers  Patient will be admitted for observation for telemetry monitoring and ACS rule out which trending of troponins  Telemetry monitoring for bradycardia as well  Electrolyte replacement  Electrolyte replacement started in the emergency department  Anemia, baseline  No sources of bleeding on exam or history  Discussed care plan with patient  Patient is grateful as she does not feel safe going home as well  Patient admitted to SOB service with persistent bradycardia but blood pressure within normal limits, patient is stable at this time      CritCare Time    Disposition  Final diagnoses:   Hypomagnesemia   ACS (acute coronary syndrome) ([place] [address] )   Bradycardia   Pulmonary hypertension ([place] [address] )   Anemia   At risk for acute ischemic cardiac event   Left bundle branch block (LBBB)   Hx of CABG   Chronic combined systolic and diastolic congestive heart failure (HCC)   Paroxysmal atrial fibrillation ([place] [address] )   Essential hypertension   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 native heart without angina pectoris   Type 2 diabetes mellitus with complication, with long-term current use of insulin ([place] [address] )   Mixed hyperlipidemia     Time reflects when diagnosis was documented in both MDM as applicable and the Disposition within this note     Time User Action Codes Description Comment    6/19/2018  6:47 PM Larry Clarkick Add [E83 42] Hypomagnesemia     6/19/2018  6:47 PM Larry Clark Add [I24 9] ACS (acute coronary syndrome) ([place] [address] )     6/19/2018  6:47 PM Larry Clarkick Add [R00 1] Bradycardia     6/19/2018  6:47 PM Larry Clark Add [I27 20] Pulmonary hypertension ([place] [address] )     6/19/2018  6:47 PM Honora Kashif Add [D64 9] Anemia     6/19/2018  6:48 PM Larry Clarkick Add [Z91 89] At risk for acute ischemic cardiac event     6/19/2018  6:48 PM Larry Clarkick Add [I44 7] Left bundle branch block (LBBB)     6/19/2018  6:48 PM Larry Clarkick Add [Z95 1] Hx of CABG     6/19/2018  6:48 PM Larry Clarkick Add [I50 42] Chronic combined systolic and diastolic congestive heart failure ([place] [address] )     6/19/2018  6:48 PM DotLarry garcia Vazquez Add [I48 0] Paroxysmal atrial fibrillation ([place] [address] )     6/19/2018  6:48 PM Larry Clarkderick Add [I10] Essential hypertension     6/19/2018  6:48 PM DotLarry garcia Vazquez Add [I25 10] 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 native heart without angina pectoris     6/19/2018  6:48 PM DotzlLarry knutson Add [E11 8,  Z79 4] Type 2 diabetes mellitus with complication, with long-term current use of insulin ([facility] )     6/19/2018  6:48 PM Larry Clark Add [E78 2] Mixed hyperlipidemia       ED Disposition     ED Disposition Condition Comment    Admit  Case was discussed with SOD and the patient's admission status was agreed to be Admission Status: observation status to the service of SOD   Follow-up Information    None         Current Discharge Medication List      CONTINUE these medications which have NOT CHANGED    Details   acetaminophen (TYLENOL) 650 mg CR tablet Take 1 tablet (650 mg total) by mouth every 8 (eight) hours as needed for mild pain Do not take in conjunction with Percocet  Qty: 90 tablet, Refills: 0    Associated Diagnoses: S/P TAVR (transcatheter aortic valve replacement)      albuterol (VENTOLIN HFA) 90 mcg/act inhaler Inhale 108 mcg 2 (two) times a day as needed 2 puffs bid PRN      amiodarone 200 mg tablet Take 1 tablet (200 mg total) by mouth 2 (two) times a day  Qty: 60 tablet, Refills: 3    Associated Diagnoses: Atrial flutter, unspecified type (HCC)      ampicillin (PRINCIPEN) 500 mg capsule Take 1 capsule (500 mg total) by mouth 4 (four) times a day for 5 days  Qty: 20 capsule, Refills: 0    Associated Diagnoses: Urinary tract infection with hematuria, site unspecified      aspirin 81 MG tablet Take 81 mg by mouth daily      atorvastatin (LIPITOR) 80 mg tablet Take 1 tablet (80 mg total) by mouth daily  Qty: 30 tablet, Refills: 2    Associated Diagnoses: Mixed hyperlipidemia      BANOPHEN 50 MG capsule TAKE ONE CAPSULE NIGHT PRIOR TO PROCEDURE AND MORNING OF    Refills: 0      calcium carbonate (TUMS) 500 mg chewable tablet Chew 2 tablets 3 (three) times a day        cholecalciferol (VITAMIN D3) 1,000 units tablet Take 1,000 Units by mouth 2 (two) times a day        ferrous sulfate 325 (65 Fe) mg tablet Take 1 tablet (325 mg total) by mouth daily with breakfast  Qty: 90 tablet, Refills: 1    Associated Diagnoses: Iron deficiency      fluticasone (FLONASE) 50 mcg/act nasal spray 1 spray into each nostril daily      furosemide (LASIX) 40 mg tablet Take 1 tablet (40 mg total) by mouth daily  Qty: 90 tablet, Refills: 3    Associated Diagnoses: Chronic systolic heart failure ([facility])      glucose blood test strip 1 each by Other route 4 (four) times a day (before meals and at bedtime) Use as instructed      LANTUS 100 UNIT/ML subcutaneous injection Inject 20 Units under the skin 2 (two) times a day  Qty: 10 mL, Refills: 0    Associated Diagnoses: Type 2 diabetes mellitus with complication, with long-term current use of insulin ([facility])      levETIRAcetam (KEPPRA) 750 mg tablet Take 1 tablet (750 mg total) by mouth every 12 (twelve) hours  Qty: 60 tablet, Refills: 11    Associated Diagnoses: Seizure ([facility])      levothyroxine 100 mcg tablet Take 1 tablet (100 mcg total) by mouth daily  Qty: 90 tablet, Refills: 1    Associated Diagnoses: Hypothyroidism, unspecified type      lisinopril (ZESTRIL) 5 mg tablet Take 1 tablet (5 mg total) by mouth daily  Qty: 90 tablet, Refills: 3    Associated Diagnoses: Chronic systolic heart failure ([facility])      loratadine (CLARITIN) 10 mg tablet Take 1 tablet (10 mg total) by mouth daily  Qty: 90 tablet, Refills: 1    Associated Diagnoses: URI, acute      magnesium oxide (MAG-OX) 400 mg Take 1 tablet (400 mg total) by mouth 2 (two) times a day  Qty: 180 tablet, Refills: 1    Associated Diagnoses: Hypomagnesemia      metFORMIN (GLUCOPHAGE) 1000 MG tablet 1,000 mg 2 (two) times a day      metoclopramide (REGLAN) 10 mg tablet Take 0 5 tablets (5 mg total) by mouth every 6 (six) hours as needed (Nausea and vomiting)  Qty: 30 tablet, Refills: 1    Associated Diagnoses: Viral gastroenteritis      metoprolol tartrate (LOPRESSOR) 100 mg tablet Take 1 tablet (100 mg total) by mouth every 12 (twelve) hours  Refills: 0    Associated Diagnoses: S/P TAVR (transcatheter aortic valve replacement)      Mometasone Furoate (ASMANEX HFA) 100 MCG/ACT AERO Inhale 2 puffs once daily  Qty: 1 Inhaler, Refills: 5    Associated Diagnoses:  Moderate asthma without complication, unspecified whether persistent      nitroglycerin (NITROSTAT) 0 4 mg SL tablet Place 0 4 mg under the tongue every 3 (three) minutes as needed      pantoprazole (PROTONIX) 40 mg tablet Take 40 mg by mouth daily      polyethylene glycol (MIRALAX) 17 g packet Take 17 g by mouth daily      potassium chloride (K-DUR,KLOR-CON) 10 mEq tablet Take 1 tablet (10 mEq total) by mouth daily  Qty: 30 tablet, Refills: 1    Associated Diagnoses: Hypokalemia      rivaroxaban (XARELTO) 20 mg tablet Take 1 tablet (20 mg total) by mouth daily  Qty: 90 tablet, Refills: 0    Associated Diagnoses: Paroxysmal atrial fibrillation (HCC)      phenazopyridine (PYRIDIUM) 200 mg tablet Take 1 tablet (200 mg total) by mouth 3 (three) times a day with meals  Qty: 10 tablet, Refills: 0    Associated Diagnoses: Dysuria           No discharge procedures on file  ED Provider  Attending physically available and evaluated Toshia Walker I managed the patient along with the ED Attending      Electronically Signed by         Ruby Ambriz DO  06/19/18 6596

## 2018-06-20 LAB
ANION GAP SERPL CALCULATED.3IONS-SCNC: 7 MMOL/L (ref 4–13)
ATRIAL RATE: 49 BPM
ATRIAL RATE: 51 BPM
ATRIAL RATE: 55 BPM
BUN SERPL-MCNC: 19 MG/DL (ref 5–25)
CALCIUM SERPL-MCNC: 8 MG/DL (ref 8.3–10.1)
CHLORIDE SERPL-SCNC: 104 MMOL/L (ref 100–108)
CO2 SERPL-SCNC: 27 MMOL/L (ref 21–32)
CREAT SERPL-MCNC: 0.98 MG/DL (ref 0.6–1.3)
ERYTHROCYTE [DISTWIDTH] IN BLOOD BY AUTOMATED COUNT: 21.4 % (ref 11.6–15.1)
GFR SERPL CREATININE-BSD FRML MDRD: 55 ML/MIN/1.73SQ M
GLUCOSE SERPL-MCNC: 179 MG/DL (ref 65–140)
GLUCOSE SERPL-MCNC: 211 MG/DL (ref 65–140)
GLUCOSE SERPL-MCNC: 254 MG/DL (ref 65–140)
GLUCOSE SERPL-MCNC: 70 MG/DL (ref 65–140)
GLUCOSE SERPL-MCNC: 73 MG/DL (ref 65–140)
HCT VFR BLD AUTO: 30.7 % (ref 34.8–46.1)
HGB BLD-MCNC: 9.2 G/DL (ref 11.5–15.4)
MAGNESIUM SERPL-MCNC: 2 MG/DL (ref 1.6–2.6)
MCH RBC QN AUTO: 22 PG (ref 26.8–34.3)
MCHC RBC AUTO-ENTMCNC: 30 G/DL (ref 31.4–37.4)
MCV RBC AUTO: 73 FL (ref 82–98)
P AXIS: 0 DEGREES
P AXIS: 50 DEGREES
P AXIS: 61 DEGREES
PHOSPHATE SERPL-MCNC: 3.8 MG/DL (ref 2.3–4.1)
PLATELET # BLD AUTO: 249 THOUSANDS/UL (ref 149–390)
PMV BLD AUTO: 10.6 FL (ref 8.9–12.7)
POTASSIUM SERPL-SCNC: 3.8 MMOL/L (ref 3.5–5.3)
PR INTERVAL: 302 MS
PR INTERVAL: 336 MS
PR INTERVAL: 398 MS
QRS AXIS: -45 DEGREES
QRS AXIS: -48 DEGREES
QRS AXIS: 137 DEGREES
QRSD INTERVAL: 154 MS
QRSD INTERVAL: 166 MS
QRSD INTERVAL: 170 MS
QT INTERVAL: 560 MS
QT INTERVAL: 580 MS
QT INTERVAL: 580 MS
QTC INTERVAL: 505 MS
QTC INTERVAL: 534 MS
QTC INTERVAL: 554 MS
RBC # BLD AUTO: 4.19 MILLION/UL (ref 3.81–5.12)
SODIUM SERPL-SCNC: 138 MMOL/L (ref 136–145)
T WAVE AXIS: 115 DEGREES
T WAVE AXIS: 119 DEGREES
T WAVE AXIS: 79 DEGREES
TROPONIN I SERPL-MCNC: <0.02 NG/ML
TROPONIN I SERPL-MCNC: <0.02 NG/ML
VENTRICULAR RATE: 49 BPM
VENTRICULAR RATE: 51 BPM
VENTRICULAR RATE: 55 BPM
WBC # BLD AUTO: 9.36 THOUSAND/UL (ref 4.31–10.16)

## 2018-06-20 PROCEDURE — 82948 REAGENT STRIP/BLOOD GLUCOSE: CPT

## 2018-06-20 PROCEDURE — 93010 ELECTROCARDIOGRAM REPORT: CPT | Performed by: INTERNAL MEDICINE

## 2018-06-20 PROCEDURE — 84484 ASSAY OF TROPONIN QUANT: CPT | Performed by: INTERNAL MEDICINE

## 2018-06-20 PROCEDURE — 85027 COMPLETE CBC AUTOMATED: CPT | Performed by: INTERNAL MEDICINE

## 2018-06-20 PROCEDURE — 83735 ASSAY OF MAGNESIUM: CPT | Performed by: INTERNAL MEDICINE

## 2018-06-20 PROCEDURE — 84100 ASSAY OF PHOSPHORUS: CPT | Performed by: INTERNAL MEDICINE

## 2018-06-20 PROCEDURE — 99235 HOSP IP/OBS SAME DATE MOD 70: CPT | Performed by: INTERNAL MEDICINE

## 2018-06-20 PROCEDURE — 80048 BASIC METABOLIC PNL TOTAL CA: CPT | Performed by: INTERNAL MEDICINE

## 2018-06-20 PROCEDURE — 93005 ELECTROCARDIOGRAM TRACING: CPT

## 2018-06-20 RX ORDER — AMLODIPINE BESYLATE 5 MG/1
5 TABLET ORAL DAILY
Status: DISCONTINUED | OUTPATIENT
Start: 2018-06-20 | End: 2018-06-21 | Stop reason: HOSPADM

## 2018-06-20 RX ORDER — AMIODARONE HYDROCHLORIDE 200 MG/1
200 TABLET ORAL 2 TIMES DAILY
Status: DISCONTINUED | OUTPATIENT
Start: 2018-06-20 | End: 2018-06-21 | Stop reason: HOSPADM

## 2018-06-20 RX ORDER — LISINOPRIL 5 MG/1
5 TABLET ORAL
Status: DISCONTINUED | OUTPATIENT
Start: 2018-06-21 | End: 2018-06-21 | Stop reason: HOSPADM

## 2018-06-20 RX ADMIN — ASPIRIN 81 MG 81 MG: 81 TABLET ORAL at 08:49

## 2018-06-20 RX ADMIN — FLUTICASONE PROPIONATE 1 SPRAY: 50 SPRAY, METERED NASAL at 08:49

## 2018-06-20 RX ADMIN — INSULIN LISPRO 2 UNITS: 100 INJECTION, SOLUTION INTRAVENOUS; SUBCUTANEOUS at 11:06

## 2018-06-20 RX ADMIN — AMLODIPINE BESYLATE 5 MG: 5 TABLET ORAL at 12:15

## 2018-06-20 RX ADMIN — LEVETIRACETAM 750 MG: 750 TABLET ORAL at 22:13

## 2018-06-20 RX ADMIN — POTASSIUM CHLORIDE 10 MEQ: 750 TABLET, EXTENDED RELEASE ORAL at 08:49

## 2018-06-20 RX ADMIN — CHOLECALCIFEROL TAB 25 MCG (1000 UNIT) 1000 UNITS: 25 TAB at 08:49

## 2018-06-20 RX ADMIN — CHOLECALCIFEROL TAB 25 MCG (1000 UNIT) 1000 UNITS: 25 TAB at 18:13

## 2018-06-20 RX ADMIN — LORATADINE 10 MG: 10 TABLET ORAL at 08:49

## 2018-06-20 RX ADMIN — FLUTICASONE PROPIONATE 1 PUFF: 110 AEROSOL, METERED RESPIRATORY (INHALATION) at 07:26

## 2018-06-20 RX ADMIN — FLUTICASONE PROPIONATE 1 PUFF: 110 AEROSOL, METERED RESPIRATORY (INHALATION) at 22:13

## 2018-06-20 RX ADMIN — LEVOTHYROXINE SODIUM 100 MCG: 100 TABLET ORAL at 06:21

## 2018-06-20 RX ADMIN — Medication 1000 MG: at 18:13

## 2018-06-20 RX ADMIN — AMOXICILLIN 500 MG: 500 CAPSULE ORAL at 06:22

## 2018-06-20 RX ADMIN — Medication 1000 MG: at 08:50

## 2018-06-20 RX ADMIN — AMOXICILLIN 500 MG: 500 CAPSULE ORAL at 14:06

## 2018-06-20 RX ADMIN — RIVAROXABAN 20 MG: 20 TABLET, FILM COATED ORAL at 08:49

## 2018-06-20 RX ADMIN — Medication 1000 MG: at 22:13

## 2018-06-20 RX ADMIN — Medication 400 MG: at 08:49

## 2018-06-20 RX ADMIN — Medication 325 MG: at 07:26

## 2018-06-20 RX ADMIN — AMIODARONE HYDROCHLORIDE 200 MG: 200 TABLET ORAL at 18:13

## 2018-06-20 RX ADMIN — LISINOPRIL 5 MG: 5 TABLET ORAL at 08:49

## 2018-06-20 RX ADMIN — FUROSEMIDE 40 MG: 40 TABLET ORAL at 08:49

## 2018-06-20 RX ADMIN — INSULIN LISPRO 1 UNITS: 100 INJECTION, SOLUTION INTRAVENOUS; SUBCUTANEOUS at 18:17

## 2018-06-20 RX ADMIN — LEVETIRACETAM 750 MG: 750 TABLET ORAL at 08:48

## 2018-06-20 RX ADMIN — Medication 400 MG: at 18:13

## 2018-06-20 RX ADMIN — PANTOPRAZOLE SODIUM 40 MG: 40 TABLET, DELAYED RELEASE ORAL at 06:21

## 2018-06-20 RX ADMIN — INSULIN GLARGINE 20 UNITS: 100 INJECTION, SOLUTION SUBCUTANEOUS at 18:15

## 2018-06-20 RX ADMIN — AMOXICILLIN 500 MG: 500 CAPSULE ORAL at 00:21

## 2018-06-20 RX ADMIN — INSULIN GLARGINE 20 UNITS: 100 INJECTION, SOLUTION SUBCUTANEOUS at 08:48

## 2018-06-20 RX ADMIN — ATORVASTATIN CALCIUM 80 MG: 80 TABLET, FILM COATED ORAL at 08:49

## 2018-06-20 NOTE — ED ATTENDING ATTESTATION
Alis Pelletier MD, saw and evaluated the patient  I have discussed the patient with the resident/non-physician practitioner and agree with the resident's/non-physician practitioner's findings, Plan of Care, and MDM as documented in the resident's/non-physician practitioner's note, except where noted  All available labs and Radiology studies were reviewed  At this point I agree with the current assessment done in the Emergency Department  I have conducted an independent evaluation of this patient a history and physical is as follows: The patient presents for evaluation of an episode of chest heaviness that occurred earlier this morning pressure leg radiating down her left arm    The patient has a history of coronary artery bypass grafting many years ago patient also had a TVAR earlier this year  Exam the patient is in no acute distress she is pain-free at the present time  Neck no JVD lungs clear heart regular abdomen soft nontender  EKG shows a bundle-branch block similar to old sinus bradycardia  1st degree av block   Cardiac evaluation admit  Critical Care Time  CritCare Time    Procedures

## 2018-06-20 NOTE — CASE MANAGEMENT
Initial Clinical Review          ADMIT OBS  On  6/19  @  1850    CHANGE to INPT status on  6/21 @ 1400    For ongoing telemetry monitoring with med adjustments    Certification I certify that inpatient services are medically necessary for this patient for a duration of greater than two midnights  See H&P and MD Progress Notes for additional information about the patient's course of treatment  ED: Date/Time/Mode of Arrival:   ED Arrival Information     Expected Arrival Acuity Means of Arrival Escorted By Service Admission Type    - 6/19/2018 16:55 Urgent Ambulance [facility] General Medicine Urgent    Arrival Complaint    chest pain          Chief Complaint:   Chief Complaint   Patient presents with    Chest Pain     Pt had chest pain and SOB starting this morning at 0400  Pt was seen at her family doctor and was advised to be seen here in the ED  Pt denies chest pain and SOB at present  History of Illness:    [de-identified] y o  female with past medical history difficult for hypertension, AFib/a flutter on Xarelto, microcytic anemia, seizure disorder, CAD status post CABG, aortic stenosis status post TAVR, CVA, type 2 diabetes mellitus, GERD, hyperlipidemia and combined systolic and diastolic CHF with EF of 63% who presented after episode of chest pain this morning  Patient woke up this morning with sensation of an elephant sitting on her chest at 4:00 a m  [place] She went to the bathroom return to bed after about an hour the sensation resolved on its own  Later in the day she presented to her PCP's office for evaluation  She notes that when walking into the office she felt extremely short of breath  EKG was performed in the office which showed bradycardia rate of 52, first-degree AV block with prolonged QTC of 503 as well as stable from previous LBBB and LAD  Patient was sent to the ED for further evaluation    In the ED patient was noted to be bradycardic with heart rate in the 40s and magnesium of 1 1  Troponin was negative  Patient was admitted for observation and ACS rule out  06/20/18 0715  97 7 °F (36 5 °C)   49  18  164/73  93 %  None (Room air)  Lying   06/20/18 0254  97 7 °F (36 5 °C)   54  18  155/65  95 %  None (Room air)  Lying   06/19/18 2304  98 3 °F (36 8 °C)   50  18  144/67  95 %  None (Room air)  Lying   06/19/18 2031  --  --  --  140/65  --  --  Lying   06/19/18 2012  98 1 °F (36 7 °C)  55  18   180/73  93 %  None (Room air)  Lying   06/19/18 1834  --   47  18  131/54  95 %  None (Room air)  --   06/19/18 1815  --   46  20  146/68  97 %  None (Room air)  --   06/19/18 1805  --   48  18   173/77  97 %  None (Room air)  --   06/19/18 1703  98 1 °F (36 7 °C)   49  18  164/79  97 %  None (Room air)       06/19/18 70 4 kg (155 lb 3 3 oz)         Admitting Diagnosis: Mixed hyperlipidemia [E78 2]  Hypomagnesemia [E83 42]  Bradycardia [R00 1]  Paroxysmal atrial fibrillation (HCC) [I48 0]  Chest pain [R07 9]  Essential hypertension [I10]  Anemia [D64 9]  ACS (acute coronary syndrome) (HCC) [I24 9]  Pulmonary hypertension (HCC) [I27 20]  Hx of CABG [Z95 1]  Chronic combined systolic and diastolic congestive heart failure (HCC) [I50 42]  Left bundle branch block (LBBB) [I44 7]  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 native heart without angina pectoris [I25 10]  Type 2 diabetes mellitus with complication, with long-term current use of insulin (HCC) [E11 8, Z79 4]  At risk for acute ischemic cardiac event [Z91 89]    Assessment/Plan:   ATTENDING NOTE:  Will hold beta-blocker and amiodarone for now  Will wait for cardiology's recommendation  Continue to check troponin  Will continue to monitor electrolytes and hemoglobin)    RESIDENT   Chest pain - single episode of chest pain this morning that lasted for approximately 1 hour and resolved on its own    Patient notes this is not typical of the chest pain that she had before with previous MI   EKG shows no acute ischemic changes  Troponin is negative x1   -trend troponins  -repeat EKG  -monitor on telemetry  -continue aspirin  -continue statin  -nitroglycerin SL p r n      Bradycardia - patient with heart rate in the 40s  Unknown cause at this time  Patient is on a few medications that could be contributing including amiodarone and metoprolol   -hold home amiodarone   -hold home metoprolol  -replete electrolytes as needed  -monitor on telemetry  -consider consulting Cardiology     Prolonged QT - QTc 503 on EKG today  Patient had recently been started on Reglan for vomiting after eating, likely gastroparesis  -hold Reglan      Hypomagnesemia - magnesium of 1 1 on presentation  She received 1 g magnesium in the ED  -2 g magnesium now  -recheck magnesium in the morning     Microcytic anemia - chronic, stable  -continue home ferrous sulfate     Atrial fibrillation on Xarelto - patient had been on amiodarone and metoprolol  We will hold these in the setting of bradycardia   -continue Xarelto  -monitor on telemetry     Type 2 diabetes mellitus - relatively well controlled with A1c of 7 7 in April 2018  -hold metformin  -continue Lantus 20 units b i d   -add sliding scale insulin for correctional coverage     GERD  -continue Protonix     UTI on course of antibiotics  -continue amoxicillin     Hypertension  -continue lisinopril  -continue Lasix  -add hydralazine 5 mg p r n  for SBP above 180     Hyperlipidemia  -continue statin     Combined systolic and diastolic CHF with EF of 07% - patient did endorse episode of shortness of breath while walking into her PCPs office this morning    However, patient appears euvolemic at this point   -continue Lasix 40 mg daily  -continue ACE-inhibitor  -will hold beta-blocker in the setting of bradycardia     CAD status post CABG  -continue statin  -continue aspirin  -continue ACE-inhibitor     Seizure disorder-chronic, stable  -continue Keppra     Asthma - no wheezing on exam  -continue albuterol p r n      Code Status: Level 1 - Full Code  VTE Pharmacologic Prophylaxis: Reason for no pharmacologic prophylaxis on Eliquis   VTE Mechanical Prophylaxis: sequential compression device    Admission Orders:  Admit telemetry  Sequential compression device to b/l LE  accucks w/sliding scale insulin  Up w/assist  Lo Na diet  Cardiology consult   Serial trops     Scheduled Meds:   Current Facility-Administered Medications:  acetaminophen 650 mg Oral Q6H PRN Hoa Downey, DO   albuterol 2 puff Inhalation Q4H PRN Hoa Downey, DO   amoxicillin 500 mg Oral [facility] Gladys Riccio, DO   aspirin 81 mg Oral Daily Max Jean-Baptiste, DO   atorvastatin 80 mg Oral Daily Hoa Downey, DO   calcium carbonate 1,000 mg Oral TID Hoa Downey, DO   cholecalciferol 1,000 Units Oral BID Hoa Downey, DO   ferrous sulfate 325 mg Oral Daily With Breakfast Max Jean-Baptiste, DO   fluticasone 1 spray Nasal Daily Max Jean-Baptiste, DO   fluticasone 1 puff Inhalation BID Hoa Downey, DO   furosemide 40 mg Oral Daily Max Jean-Baptiste, DO   hydrALAZINE 5 mg Intravenous Q4H PRN Gladys Freeman, DO   insulin glargine 20 Units Subcutaneous BID Hoa Downey, DO   insulin lispro 1-5 Units Subcutaneous TID AC Max Carrera, DO   levETIRAcetam 750 mg Oral Q12H [facility] Hoa Downey, DO   levothyroxine 100 mcg Oral Early Morning Max Jean-Baptiste, DO   lisinopril 5 mg Oral Daily Max Jean-Baptiste, DO   loratadine 10 mg Oral Daily Max Jean-Baptiste, DO   magnesium oxide 400 mg Oral BID Hoa Downey, DO   melatonin 3 mg Oral HS PRN Maria Eugenia Cuenca PA-C   nitroglycerin 0 4 mg Sublingual Q5 Min PRN Hoa Downey, DO   pantoprazole 40 mg Oral Early Morning Max Jean-Baptiste, DO   polyethylene glycol 17 g Oral Daily Max Jean-Baptiste, DO   potassium chloride 10 mEq Oral Daily Hoa Downey, DO   rivaroxaban 20 mg Oral Daily Max Jean-Baptiste, DO   sodium chloride (PF) 3 mL Intravenous PRN Dick Clark, DO

## 2018-06-20 NOTE — H&P
INTERNAL MEDICINE HISTORY AND PHYSICAL  [id number] SOD Team C     NAME: Vilma Kearney  AGE: [de-identified] y o  SEX: female  : 1940   MRN: [id number]  ENCOUNTER: [id number]    DATE: 2018  TIME: 11:27 PM    Primary Care Physician: Paty Nieto MD  Admitting Provider: Florette Rubinstein, MD    Chief complaint: episode of chest pain    History of Present Illness     Vilma Kearney is a [de-identified] y o  female with past medical history difficult for hypertension, AFib/a flutter on Xarelto, microcytic anemia, seizure disorder, CAD status post CABG, aortic stenosis status post TAVR, CVA, type 2 diabetes mellitus, GERD, hyperlipidemia and combined systolic and diastolic CHF with EF of 81% who presented after episode of chest pain this morning  Patient woke up this morning with sensation of an elephant sitting on her chest at 4:00 a m  Ezekiel Tulio She went to the bathroom return to bed after about an hour the sensation resolved on its own  She denies any diaphoresis, nausea, vomiting or shortness of breath  She notes this felt different from her previous chest pain when she had an MI  Later in the day she presented to her PCP's office for evaluation  She notes that when walking into the office she felt extremely short of breath  EKG was performed in the office which showed bradycardia rate of 52, first-degree AV block with prolonged QTC of 503 as well as stable from previous LBBB and LAD  Patient was sent to the ED for further evaluation  In the ED patient was noted to be bradycardic with heart rate in the 40s and magnesium of 1 1  Troponin was negative  Patient was admitted for observation and ACS rule out  Review of Systems   Review of Systems   Constitutional: Negative for chills and fever  HENT: Negative for congestion and rhinorrhea  Respiratory: Negative for cough, shortness of breath and wheezing  Cardiovascular: Negative for chest pain     Gastrointestinal: Negative for abdominal distention, abdominal pain, blood in stool and constipation  Genitourinary: Negative for dysuria and frequency  Musculoskeletal: Negative for gait problem  Skin: Negative for rash  Neurological: Positive for weakness  Negative for dizziness, syncope and light-headedness  Psychiatric/Behavioral: Negative for confusion         Past Medical History     Past Medical History:   Diagnosis Date    Altered mental status     Anticoagulated     Coumadin for Aflutter    Asthma     Atrial flutter ([facility])     maintained on coumadin anticoag    CAD (coronary artery disease)     Candidiasis     Carotid stenosis, bilateral     Cataract     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) ([facility])     Chronic fatigue syndrome     Chronic low back pain     Concussion w loss of consciousness of unsp duration, init     Coronary artery disease     CVA (cerebral vascular accident) ([facility] [address] ) 4/17/2018    Diabetes mellitus ([facility] [address] )     type 2, insulin dependent    DJD (degenerative joint disease)     GERD (gastroesophageal reflux disease)     Herpes zoster     HLD (hyperlipidemia)     HTN (hypertension)     Hyperlipidemia     Hypothyroidism     Irritable bowel syndrome     Lumbar radiculopathy     Lyme disease     Dx in hospital 8/2011    Lyme disease     MI (myocardial infarction) ([facility] [address] )     Migraines     Muscle spasm     Non-neoplastic nevus     Nontoxic multinodular goiter     OA (osteoarthritis)     Osteoarthritis     Other chronic pain     PAD (peripheral artery disease) ([facility])     Palpitations     Pseudogout     Seizures ([facility] [address] )     Spinal stenosis     Transient cerebral ischemia     Trigger ring finger     Viral gastroenteritis        Past Surgical History     Past Surgical History:   Procedure Laterality Date    APPENDECTOMY      ARTERIOGRAM  12/19/2017    CARDIAC CATHETERIZATION      CHOLECYSTECTOMY      COLONOSCOPY      CORONARY ARTERY BYPASS GRAFT  2004    LUMBAR LAMINECTOMY      GA ECHO TRANSESOPHAG R-T 2D W/PRB IMG ACQUISJ I&R N/A 4/3/2018    Procedure: TRANSESOPHAGEAL ECHOCARDIOGRAM (ROBB); Surgeon: Vijaya Baez DO;  Location: [facility];  Service: Cardiac Surgery    MI REPLACE AORTIC VALVE OPENFEMORAL ARTERY APPROACH N/A 4/3/2018    Procedure: REPLACEMENT AORTIC VALVE TRANSCATHETER (TAVR) TRANSFEMORAL w/ 26MM IVY YEVGENIY S3 VALVE;  Surgeon: Vijaya Baez DO;  Location: [facility];  Service: Cardiac Surgery    THYROIDECTOMY      TOTAL ABDOMINAL HYSTERECTOMY W/ BILATERAL SALPINGOOPHORECTOMY         Social History     History   Alcohol Use No     History   Drug Use No     History   Smoking Status    Never Smoker   Smokeless Tobacco    Never Used       Family History     Family History   Problem Relation Age of Onset    Cancer Mother     Stroke Mother     Cancer Father     Heart disease Father     Breast cancer Sister     Diabetes Daughter         Passed away January 2017        Medications Prior to Admission     Prior to Admission medications    Medication Sig Start Date End Date Taking? Authorizing Provider   acetaminophen (TYLENOL) 650 mg CR tablet Take 1 tablet (650 mg total) by mouth every 8 (eight) hours as needed for mild pain Do not take in conjunction with Percocet   4/6/18  Yes Amando Hernandez PA-C   albuterol (VENTOLIN HFA) 90 mcg/act inhaler Inhale 108 mcg 2 (two) times a day as needed 2 puffs bid PRN   Yes Historical Provider, MD   amiodarone 200 mg tablet Take 1 tablet (200 mg total) by mouth 2 (two) times a day 6/19/18  Yes Kacie Ferro MD   ampicillin (PRINCIPEN) 500 mg capsule Take 1 capsule (500 mg total) by mouth 4 (four) times a day for 5 days 6/15/18 6/20/18 Yes RAMO Ling   aspirin 81 MG tablet Take 81 mg by mouth daily   Yes Historical Provider, MD   atorvastatin (LIPITOR) 80 mg tablet Take 1 tablet (80 mg total) by mouth daily 4/26/18  Yes James Logan MD   BANOPHEN 50 MG capsule TAKE ONE CAPSULE NIGHT PRIOR TO PROCEDURE AND MORNING OF  3/29/18  Yes Historical Provider, MD   calcium carbonate (TUMS) 500 mg chewable tablet Chew 2 tablets 3 (three) times a day     Yes Historical Provider, MD   cholecalciferol (VITAMIN D3) 1,000 units tablet Take 1,000 Units by mouth 2 (two) times a day   10/20/16  Yes Historical Provider, MD   ferrous sulfate 325 (65 Fe) mg tablet Take 1 tablet (325 mg total) by mouth daily with breakfast 6/15/18  Yes RAMO Ling   fluticasone (FLONASE) 50 mcg/act nasal spray 1 spray into each nostril daily   Yes Historical Provider, MD   furosemide (LASIX) 40 mg tablet Take 1 tablet (40 mg total) by mouth daily 5/30/18  Yes Elena Beltran MD   glucose blood test strip 1 each by Other route 4 (four) times a day (before meals and at bedtime) Use as instructed   Yes Historical Provider, MD   LANTUS 100 UNIT/ML subcutaneous injection Inject 20 Units under the skin 2 (two) times a day 4/19/18  Yes Oziel Almazan   levETIRAcetam (KEPPRA) 750 mg tablet Take 1 tablet (750 mg total) by mouth every 12 (twelve) hours 6/13/18  Yes Saima Mccartney MD   levothyroxine 100 mcg tablet Take 1 tablet (100 mcg total) by mouth daily 6/4/18  Yes RAMO Ling   lisinopril (ZESTRIL) 5 mg tablet Take 1 tablet (5 mg total) by mouth daily 5/30/18  Yes Elena Beltran MD   loratadine (CLARITIN) 10 mg tablet Take 1 tablet (10 mg total) by mouth daily 6/4/18  Yes RAMO Ling   magnesium oxide (MAG-OX) 400 mg Take 1 tablet (400 mg total) by mouth 2 (two) times a day 6/15/18  Yes RAMO Ling   metFORMIN (GLUCOPHAGE) 1000 MG tablet 1,000 mg 2 (two) times a day   Yes Historical Provider, MD   metoclopramide (REGLAN) 10 mg tablet Take 0 5 tablets (5 mg total) by mouth every 6 (six) hours as needed (Nausea and vomiting) 5/29/18  Yes Lina Ellington DO   metoprolol tartrate (LOPRESSOR) 100 mg tablet Take 1 tablet (100 mg total) by mouth every 12 (twelve) hours 4/6/18  Yes Pk Suazo PA-C Mometasone Furoate [facility] HFA) 100 MCG/ACT AERO Inhale 2 puffs once daily 3/12/18  Yes Glory Rosales MD   nitroglycerin (NITROSTAT) 0 4 mg SL tablet Place 0 4 mg under the tongue every 3 (three) minutes as needed 5/11/15  Yes Historical Provider, MD   pantoprazole (PROTONIX) 40 mg tablet Take 40 mg by mouth daily   Yes Historical Provider, MD   polyethylene glycol (MIRALAX) 17 g packet Take 17 g by mouth daily   Yes Historical Provider, MD   potassium chloride (K-DUR,KLOR-CON) 10 mEq tablet Take 1 tablet (10 mEq total) by mouth daily 5/31/18  Yes Deborah Rosario DO   rivaroxaban (XARELTO) 20 mg tablet Take 1 tablet (20 mg total) by mouth daily 6/4/18  Yes RAMO Ling   phenazopyridine (PYRIDIUM) 200 mg tablet Take 1 tablet (200 mg total) by mouth 3 (three) times a day with meals 6/12/18   RAMO Reeder   amiodarone 200 mg tablet Take 1 tablet (200 mg total) by mouth 2 (two) times a day  Patient taking differently: Take 200 mg by mouth daily   5/26/18 6/19/18  Karma Stanley MD   magnesium oxide (MAG-OX) 400 mg tablet Take 1 tablet by mouth 2 (two) times a day 6/15/18 6/19/18  Historical Provider, MD       Allergies     Allergies   Allergen Reactions    Other Chest Pain     IVP-listed as "chest pain" in previous chart, patient stated this occurred "a long time ago" but does not remember exactly occurred     Cephalosporins Chest Pain    Contrast [Iodinated Diagnostic Agents] Other (See Comments)     Flash pulm edema    Doxycycline Chest Pain    Levaquin [Levofloxacin] Chest Pain    Ondansetron Chest Pain     Prolonged QT    Toradol [Ketorolac Tromethamine] Chest Pain       Objective     Vitals:    06/19/18 1834 06/19/18 2012 06/19/18 2031 06/19/18 2304   BP: 131/54 (!) 180/73 140/65 144/67   BP Location:  Left arm Left arm Right arm   Pulse: (!) 47 55  (!) 50   Resp: 18 18 18   Temp:  98 1 °F (36 7 °C)  98 3 °F (36 8 °C)   TempSrc:  Oral  Oral   SpO2: 95% 93%  95%   Weight:  70 4 kg (155 lb 3 3 oz)     Height:  5' 4" (1 626 m)       Body mass index is 26 64 kg/m²  No intake or output data in the 24 hours ending 06/19/18 2327  Invasive Devices     Peripheral Intravenous Line            Peripheral IV 06/19/18 Left Forearm less than 1 day                Physical Exam   Constitutional: She is oriented to person, place, and time  She appears well-developed and well-nourished  No distress  HENT:   Head: Normocephalic and atraumatic  Eyes: EOM are normal  Right eye exhibits no discharge  Left eye exhibits no discharge  No scleral icterus  Neck: No tracheal deviation present  Cardiovascular: Regular rhythm  Murmur heard  Bradycardic    Pulmonary/Chest: Effort normal and breath sounds normal  No stridor  Dry crackles at lung bases BL   Abdominal: Soft  Bowel sounds are normal  She exhibits no distension  There is no tenderness  Musculoskeletal: She exhibits no edema or deformity  Neurological: She is alert and oriented to person, place, and time  Skin: Skin is warm  No rash noted  She is not diaphoretic  Psychiatric: She has a normal mood and affect  Her behavior is normal    Nursing note and vitals reviewed  Lab Results: I have personally reviewed pertinent reports      CBC:   Results from last 7 days  Lab Units 06/19/18  1717   WBC Thousand/uL 10 88*   RBC Million/uL 4 50   HEMOGLOBIN g/dL 9 8*   HEMATOCRIT % 33 1*   MCV fL 74*   MCH pg 21 8*   MCHC g/dL 29 6*   RDW % 21 6*   MPV fL 10 5   PLATELETS Thousands/uL 295   NRBC AUTO /100 WBCs 0   NEUTROS PCT % 59   LYMPHS PCT % 20   MONOS PCT % 10   EOS PCT % 9*   BASOS PCT % 1   NEUTROS ABS Thousands/µL 6 58   LYMPHS ABS Thousands/µL 2 15   MONOS ABS Thousand/µL 1 05   EOS ABS Thousand/µL 0 93*   , Chemistry Profile:   Results from last 7 days  Lab Units 06/19/18  1717   SODIUM mmol/L 136   POTASSIUM mmol/L 4 6   CHLORIDE mmol/L 102   CO2 mmol/L 26   ANION GAP mmol/L 8   BUN mg/dL 24   CREATININE mg/dL 1 09   GLUCOSE RANDOM mg/dL 143* CALCIUM mg/dL 8 2*   AST U/L 20   ALT U/L 20   ALK PHOS U/L 62   TOTAL PROTEIN g/dL 7 8   BILIRUBIN TOTAL mg/dL 0 40   EGFR ml/min/1 73sq m 48       Imaging: I have personally reviewed pertinent films in PACS  Ct Abdomen Pelvis Wo Contrast    Result Date: 5/23/2018  Narrative: CT ABDOMEN AND PELVIS WITHOUT IV CONTRAST INDICATION:   vomiting/ cant tolerate po  COMPARISON: CT of the abdomen and pelvis on May 18, 2018  TECHNIQUE:  CT examination of the abdomen and pelvis was performed without intravenous contrast   Axial, sagittal, and coronal 2D reformatted images were created from the source data and submitted for interpretation  Radiation dose length product (DLP) for this visit:  567 28 mGy-cm   This examination, like all CT scans performed in the [facility], was performed utilizing techniques to minimize radiation dose exposure, including the use of iterative  reconstruction and automated exposure control  Enteric contrast was not administered  FINDINGS: ABDOMEN LOWER CHEST:  Mitral annular calcification  LIVER/BILIARY TREE:  Unremarkable  GALLBLADDER:  Gallbladder is surgically absent  SPLEEN:  Unremarkable  PANCREAS:  Atrophic ADRENAL GLANDS:  Unremarkable  KIDNEYS/URETERS:  One or more simple appearing renal cyst(s) is noted  Otherwise unremarkable kidneys  Redemonstrated bilateral nonobstructing nephrolithiasis  No hydronephrosis  STOMACH AND BOWEL:  The gastric wall appears diffusely thickened which may be seen in setting of gastritis  There is no bowel obstruction  A few hyperdensities are seen within the bowel which likely reflect ingested material or pills  There is liquid stool in the colon which may be seen in setting of diarrhea  APPENDIX:  Status post appendectomy  ABDOMINOPELVIC CAVITY:  No ascites or free intraperitoneal air  No lymphadenopathy  VESSELS:  Moderate atherosclerotic calcifications  PELVIS REPRODUCTIVE ORGANS:  Patient is status post hysterectomy   URINARY BLADDER:  Unremarkable  ABDOMINAL WALL/INGUINAL REGIONS:  Unremarkable  OSSEOUS STRUCTURES:  Degenerative changes of the osseous structures  No aggressive osseous lesion  Impression: 1  Diffuse thickening of the gastric wall which may be seen in setting of gastritis  2   Liquid stool in the colon which may be seen in the setting of diarrheal illness  3   Stable bilateral nonobstructing nephrolithiasis  Workstation performed: [id number]     X-ray Chest 2 Views    Result Date: 6/19/2018  Narrative: CHEST INDICATION:   chest pain  COMPARISON:  5/1/2018 EXAM PERFORMED/VIEWS:  XR CHEST PA & LATERAL FINDINGS:  There are median sternotomy wires indicating prior cardiac surgery  Heart shadow is enlarged but unchanged from prior exam  The lungs have cleared  No pneumothorax or pleural effusion  Osseous structures appear within normal limits for patient age  Impression: Stable cardiomegaly  No acute cardiopulmonary disease  Workstation performed: [id number]         EKG, Pathology, and Other Studies: I have personally reviewed pertinent reports        Medications given in Emergency Department     Medication Administration - last 24 hours from 06/18/2018 2327 to 06/19/2018 2327       Date/Time Order Dose Route Action Action by     06/19/2018 1925 magnesium sulfate IVPB (premix) SOLN 1 g 0 g Intravenous Kerry Valdovinos RN     06/19/2018 1837 magnesium sulfate IVPB (premix) SOLN 1 g 1 g Intravenous Enrico Skaggs, RN     06/19/2018 2244 calcium carbonate (TUMS) chewable tablet 1,000 mg 1,000 mg Oral Given Marco Stovall, TOMY     06/19/2018 2243 cholecalciferol (VITAMIN D3) tablet 1,000 Units 1,000 Units Oral Given Marco Stovall RN     06/19/2018 2245 insulin glargine (LANTUS) subcutaneous injection 20 Units 0 2 mL 20 Units Subcutaneous Given Marco Stovall RN     06/19/2018 2244 levETIRAcetam (KEPPRA) tablet 750 mg 750 mg Oral Given Marco Stovall RN     06/19/2018 2243 magnesium oxide (MAG-OX) tablet 400 mg 400 mg Oral Given Zahra Todd RN     06/19/2018 5793 fluticasone (FLOVENT HFA) 110 MCG/ACT inhaler 1 puff 1 puff Inhalation Not Given Zahra Todd RN          Assessment and Plan     Chest pain - single episode of chest pain this morning that lasted for approximately 1 hour and resolved on its own  Patient notes this is not typical of the chest pain that she had before with previous MI   EKG shows no acute ischemic changes  Troponin is negative x1   -trend troponins  -repeat EKG  -monitor on telemetry  -continue aspirin  -continue statin  -nitroglycerin SL p r n  Bradycardia - patient with heart rate in the 40s  Unknown cause at this time  Patient is on a few medications that could be contributing including amiodarone and metoprolol   -hold home amiodarone   -hold home metoprolol  -replete electrolytes as needed  -monitor on telemetry  -consider consulting Cardiology    Prolonged QT - QTc 503 on EKG today  Patient had recently been started on Reglan for vomiting after eating, likely gastroparesis  -hold Reglan     Hypomagnesemia - magnesium of 1 1 on presentation  She received 1 g magnesium in the ED  -2 g magnesium now  -recheck magnesium in the morning    Microcytic anemia - chronic, stable  -continue home ferrous sulfate    Atrial fibrillation on Xarelto - patient had been on amiodarone and metoprolol  We will hold these in the setting of bradycardia   -continue Xarelto  -monitor on telemetry    Type 2 diabetes mellitus - relatively well controlled with A1c of 7 7 in April 2018  -hold metformin  -continue Lantus 20 units b i d   -add sliding scale insulin for correctional coverage    GERD  -continue Protonix    UTI on course of antibiotics    -continue amoxicillin    Hypertension  -continue lisinopril  -continue Lasix  -add hydralazine 5 mg p r n  for SBP above 180    Hyperlipidemia  -continue statin    Combined systolic and diastolic CHF with EF of 81% - patient did endorse episode of shortness of breath while walking into her PCPs office this morning  However, patient appears euvolemic at this point   -continue Lasix 40 mg daily  -continue ACE-inhibitor  -will hold beta-blocker in the setting of bradycardia    CAD status post CABG  -continue statin  -continue aspirin  -continue ACE-inhibitor    Seizure disorder-chronic, stable  -continue Keppra    Asthma - no wheezing on exam  -continue albuterol p r n  Code Status: Level 1 - Full Code  VTE Pharmacologic Prophylaxis: Reason for no pharmacologic prophylaxis on Eliquis   VTE Mechanical Prophylaxis: sequential compression device  Admission Status: OBSERVATION    Admission Time  I spent 45 minutes admitting the patient  This involved direct patient contact where I performed a full history and physical, reviewing previous records, and reviewing laboratory and other diagnostic studies      Govind Roach DO  Internal Medicine  PGY-2

## 2018-06-20 NOTE — MEDICAL STUDENT
MEDICAL STUDENT  Inpatient H&P for TRAINING ONLY   Not Part of Legal Medical Record       H&P Exam - Emerald Bryant [de-identified] y o  female MRN: [id number]    Unit/Bed#: CW2 211-02 Encounter: [id number]    Assessment/Plan:  Chest pain   - Had chest pain yesterday morning that resolved on its own and is not similar  to previous episodes of chest pain   - EKG did not reveal ST elevations and series troponins are all negative    - Continue to monitor on telemetry  Bradycardia   - Heart rate is in the 40s with an unknown cause considering her heart rate is  usually faster due to history at atrial flutter   - Hold metoprolol   - Hold amlodipine    - Continue to monitor heart rate   - Will talk to cardiology about recommendations about her bradycardia upon  discharge  Prolonged QT   - QT was 503 on ECG    - Will hold reglan since can prolong QT   Hypomagnesemia   - In ED magnesium was 1 1 and she was given 1 g of magnesium and it is  now stable at 2 0 this morning     - Will continue to monitor  Hypertension   - continue Lisinopril and Lasix   - Will start amiodarone since she is having elevated pressures and cannot  have a beta blocker  Hyperlipidemia   - Continue current medications   Type 2 diabetes mellitus   - Continue Lantus 20 units BID   - Start sliding scale    History of Present Illness   Cristal Smalls is an [de-identified]year old female with a PMH of atrial flutter, CAD post CABG many years ago, aortic stenosis post valve replacement, DM, GERD, HTN, HLD, and CHF who presents following an episode of chest pain yesterday morning  She states that she woke up at 4am with the feeling of an elephant stepping on her chest so she got up went to the bathroom and then went back to bed  She said it radiated all the way down her left arm  She said the whole episode lasted about 2 5 minutes  Her home nurse that comes in then made an appointment with her PCP after hearing about this chest pain   At her PCP they found that she was bradycardic with a heart rate in the high 40s low 50s  They did an ECG and found a heart rate of 53, first degree AV block with a prolonged QT @ 503 with a stable LBBB and left axis deviation  She was then sent to the ED for further work-up  She does state that she felt really short of breath walking into her PCP's office yesterday which is unusual for her  She also states that she felt light-headed like her head was foggy  She denies any fevers/chills, chest pain, shortness of breath, abdominal pain, N/V, diarrhea, constipation, or dizziness  This morning she states she felt funny but her blood sugar was in the 70s and she felt better after she got some orange juice  She denies any other complaints at this time       ROS: As seen in HPI    Historical Information   Past Medical History:   Diagnosis Date    Altered mental status     Anticoagulated     Coumadin for Aflutter    Asthma     Atrial flutter ([facility] [address] )     maintained on coumadin anticoag    CAD (coronary artery disease)     Candidiasis     Carotid stenosis, bilateral     Cataract     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) (HCC)     Chronic fatigue syndrome     Chronic low back pain     Concussion w loss of consciousness of unsp duration, init     Coronary artery disease     CVA (cerebral vascular accident) ([facility] [address] ) 4/17/2018    Diabetes mellitus ([facility] [address] )     type 2, insulin dependent    DJD (degenerative joint disease)     GERD (gastroesophageal reflux disease)     Herpes zoster     HLD (hyperlipidemia)     HTN (hypertension)     Hyperlipidemia     Hypothyroidism     Irritable bowel syndrome     Lumbar radiculopathy     Lyme disease     Dx in hospital 8/2011    Lyme disease     MI (myocardial infarction) ([facility] [address] )     Migraines     Muscle spasm     Non-neoplastic nevus     Nontoxic multinodular goiter     OA (osteoarthritis)     Osteoarthritis     Other chronic pain     PAD (peripheral artery disease) ([facility] [address] )     Palpitations     Pseudogout     Seizures (HCC)     Spinal stenosis     Transient cerebral ischemia     Trigger ring finger     Viral gastroenteritis      Past Surgical History:   Procedure Laterality Date    APPENDECTOMY      ARTERIOGRAM  12/19/2017    CARDIAC CATHETERIZATION      CHOLECYSTECTOMY      COLONOSCOPY      CORONARY ARTERY BYPASS GRAFT  2004    LUMBAR LAMINECTOMY      CO ECHO TRANSESOPHAG R-T 2D W/PRB IMG ACQUISJ I&R N/A 4/3/2018    Procedure: TRANSESOPHAGEAL ECHOCARDIOGRAM (ROBB); Surgeon: Syed Rosado DO;  [facility]: [facility];  Service: Cardiac Surgery    CO REPLACE AORTIC VALVE OPENFEMORAL ARTERY APPROACH N/A 4/3/2018    Procedure: REPLACEMENT AORTIC VALVE TRANSCATHETER (TAVR) TRANSFEMORAL w/ 26MM IVY YEVGENIY S3 VALVE;  Surgeon: Syed Rosado DO;  [facility];  Service: Cardiac Surgery    THYROIDECTOMY      TOTAL ABDOMINAL HYSTERECTOMY W/ BILATERAL SALPINGOOPHORECTOMY       Social History   History   Alcohol Use No     History   Drug Use No     History   Smoking Status    Never Smoker   Smokeless Tobacco    Never Used     Family History:   Family History   Problem Relation Age of Onset    Cancer Mother     Stroke Mother     Cancer Father     Heart disease Father     Breast cancer Sister     Diabetes Daughter         Passed away January 2017        Meds/Allergies   PTA meds:   Prior to Admission Medications   Prescriptions Last Dose Informant Patient Reported? Taking?    BANOPHEN 50 MG capsule  Family Member Yes Yes   Sig: TAKE ONE CAPSULE NIGHT PRIOR TO PROCEDURE AND MORNING OF    LANTUS 100 UNIT/ML subcutaneous injection  Family Member No Yes   Sig: Inject 20 Units under the skin 2 (two) times a day   Mometasone Furoate (ASMANEX HFA) 100 MCG/ACT AERO  Family Member No Yes   Sig: Inhale 2 puffs once daily   acetaminophen (TYLENOL) 650 mg CR tablet  Family Member No Yes   Sig: Take 1 tablet (650 mg total) by mouth every 8 (eight) hours as needed for mild pain Do not take in conjunction with Percocet     albuterol (VENTOLIN HFA) 90 mcg/act inhaler  Family Member Yes Yes   Sig: Inhale 108 mcg 2 (two) times a day as needed 2 puffs bid PRN   amiodarone 200 mg tablet  Family Member No Yes   Sig: Take 1 tablet (200 mg total) by mouth 2 (two) times a day   ampicillin (PRINCIPEN) 500 mg capsule  Family Member No Yes   Sig: Take 1 capsule (500 mg total) by mouth 4 (four) times a day for 5 days   aspirin 81 MG tablet  Family Member Yes Yes   Sig: Take 81 mg by mouth daily   atorvastatin (LIPITOR) 80 mg tablet  Family Member No Yes   Sig: Take 1 tablet (80 mg total) by mouth daily   calcium carbonate (TUMS) 500 mg chewable tablet  Family Member Yes Yes   Sig: Chew 2 tablets 3 (three) times a day     cholecalciferol (VITAMIN D3) 1,000 units tablet  Family Member Yes Yes   Sig: Take 1,000 Units by mouth 2 (two) times a day     ferrous sulfate 325 (65 Fe) mg tablet  Family Member No Yes   Sig: Take 1 tablet (325 mg total) by mouth daily with breakfast   fluticasone (FLONASE) 50 mcg/act nasal spray  Family Member Yes Yes   Si spray into each nostril daily   furosemide (LASIX) 40 mg tablet  Family Member No Yes   Sig: Take 1 tablet (40 mg total) by mouth daily   glucose blood test strip  Family Member Yes Yes   Si each by Other route 4 (four) times a day (before meals and at bedtime) Use as instructed   levETIRAcetam (KEPPRA) 750 mg tablet  Family Member No Yes   Sig: Take 1 tablet (750 mg total) by mouth every 12 (twelve) hours   levothyroxine 100 mcg tablet  Family Member No Yes   Sig: Take 1 tablet (100 mcg total) by mouth daily   lisinopril (ZESTRIL) 5 mg tablet  Family Member No Yes   Sig: Take 1 tablet (5 mg total) by mouth daily   loratadine (CLARITIN) 10 mg tablet  Family Member No Yes   Sig: Take 1 tablet (10 mg total) by mouth daily   magnesium oxide (MAG-OX) 400 mg  Family Member No Yes   Sig: Take 1 tablet (400 mg total) by mouth 2 (two) times a day   metFORMIN (GLUCOPHAGE) 1000 MG tablet  Family Member Yes Yes   Si,000 mg 2 (two) times a day   metoclopramide (REGLAN) 10 mg tablet  Family Member No Yes   Sig: Take 0 5 tablets (5 mg total) by mouth every 6 (six) hours as needed (Nausea and vomiting)   metoprolol tartrate (LOPRESSOR) 100 mg tablet  Family Member No Yes   Sig: Take 1 tablet (100 mg total) by mouth every 12 (twelve) hours   nitroglycerin (NITROSTAT) 0 4 mg SL tablet  Family Member Yes Yes   Sig: Place 0 4 mg under the tongue every 3 (three) minutes as needed   pantoprazole (PROTONIX) 40 mg tablet  Family Member Yes Yes   Sig: Take 40 mg by mouth daily   phenazopyridine (PYRIDIUM) 200 mg tablet Unknown at Unknown time Family Member No No   Sig: Take 1 tablet (200 mg total) by mouth 3 (three) times a day with meals   polyethylene glycol (MIRALAX) 17 g packet  Family Member Yes Yes   Sig: Take 17 g by mouth daily   potassium chloride (K-DUR,KLOR-CON) 10 mEq tablet  Family Member No Yes   Sig: Take 1 tablet (10 mEq total) by mouth daily   rivaroxaban (XARELTO) 20 mg tablet  Family Member No Yes   Sig: Take 1 tablet (20 mg total) by mouth daily      Facility-Administered Medications: None     Allergies   Allergen Reactions    Other Chest Pain     IVP-listed as "chest pain" in previous chart, patient stated this occurred "a long time ago" but does not remember exactly occurred     Cephalosporins Chest Pain    Contrast [Iodinated Diagnostic Agents] Other (See Comments)     Flash pulm edema    Doxycycline Chest Pain    Levaquin [Levofloxacin] Chest Pain    Ondansetron Chest Pain     Prolonged QT    Toradol [Ketorolac Tromethamine] Chest Pain       Objective   First Vitals:   Blood Pressure: 164/79 (18)  Pulse: (!) 49 (18)  Temperature: 98 1 °F (36 7 °C) (18)  Temp Source: Oral (18)  Respirations: 18 (18)  Height: 5' 4" (162 6 cm) (18)  Weight - Scale: 71 2 kg (157 lb) (18)  SpO2: 97 % (18 1703)    Current Vitals:   Blood Pressure: 164/73 (18 0715)  Pulse: (!) 49 (18)  Temperature: 97 7 °F (36 5 °C) (18)  Temp Source: Oral (18)  Respirations: 18 (18)  Height: 5' 4" (162 6 cm) (18)  Weight - Scale: 70 4 kg (155 lb 3 3 oz) (18)  SpO2: 93 % (18)    No intake or output data in the 24 hours ending 18 0834    Invasive Devices     Peripheral Intravenous Line            Peripheral IV 18 Left Forearm less than 1 day                Physical Exam:   General: well-developed and well-nourished female sitting up on the edge of bed in no acute distress  HEENT: Normocephalic and atraumatic  PERRLA  Cardio: Regular rhythm  Bradycardic, systolic murmur heard  PV: 2+ pulses bilaterally in upper and lower extremities  Lungs: Clear to auscultation no wheezes, rales, or rhonchi  Abdomen: Soft, non-distended  Normoactive bowel sounds  No tenderness to palpation in all for quadrants  Neuro: Alert and oriented x3   Skin: No rashes noted  Does have multiple scabbed wound on her feet and toes  Lab Results:   Lab Results   Component Value Date     2018    K 3 8 2018     2018    CO2 27 2018    ANIONGAP 7 2018    BUN 19 2018    CREATININE 0 98 2018    GLUCOSE 70 2018    GLUF 128 (H) 2018    CALCIUM 8 0 (L) 2018    AST 20 2018    ALT 20 2018    ALKPHOS 62 2018    PROT 7 8 2018    BILITOT 0 40 2018    EGFR 55 2018     Lab Results   Component Value Date    WBC 9 36 2018    HGB 9 2 (L) 2018    HCT 30 7 (L) 2018    MCV 73 (L) 2018     2018     Lab Results   Component Value Date    CKTOTAL 83 2017    TROPONINI <0 02 2018     M 0    Imaging: CXR: stable cardiomegaly  No acute cardiopulmonary disease       Code Status: Level 1 - Full Code  Advance Directive and Living Will:      Power of :    POLST:

## 2018-06-21 VITALS
HEIGHT: 64 IN | TEMPERATURE: 98.3 F | SYSTOLIC BLOOD PRESSURE: 144 MMHG | WEIGHT: 155.2 LBS | HEART RATE: 56 BPM | RESPIRATION RATE: 18 BRPM | OXYGEN SATURATION: 95 % | BODY MASS INDEX: 26.5 KG/M2 | DIASTOLIC BLOOD PRESSURE: 65 MMHG

## 2018-06-21 LAB
ERYTHROCYTE [DISTWIDTH] IN BLOOD BY AUTOMATED COUNT: 21.3 % (ref 11.6–15.1)
GLUCOSE SERPL-MCNC: 114 MG/DL (ref 65–140)
GLUCOSE SERPL-MCNC: 158 MG/DL (ref 65–140)
GLUCOSE SERPL-MCNC: 277 MG/DL (ref 65–140)
GLUCOSE SERPL-MCNC: 392 MG/DL (ref 65–140)
HCT VFR BLD AUTO: 32.3 % (ref 34.8–46.1)
HGB BLD-MCNC: 9.8 G/DL (ref 11.5–15.4)
MCH RBC QN AUTO: 22.3 PG (ref 26.8–34.3)
MCHC RBC AUTO-ENTMCNC: 30.3 G/DL (ref 31.4–37.4)
MCV RBC AUTO: 73 FL (ref 82–98)
PLATELET # BLD AUTO: 230 THOUSANDS/UL (ref 149–390)
PMV BLD AUTO: 10.3 FL (ref 8.9–12.7)
RBC # BLD AUTO: 4.4 MILLION/UL (ref 3.81–5.12)
WBC # BLD AUTO: 7.94 THOUSAND/UL (ref 4.31–10.16)

## 2018-06-21 PROCEDURE — 85027 COMPLETE CBC AUTOMATED: CPT | Performed by: INTERNAL MEDICINE

## 2018-06-21 PROCEDURE — 99239 HOSP IP/OBS DSCHRG MGMT >30: CPT | Performed by: INTERNAL MEDICINE

## 2018-06-21 PROCEDURE — 82948 REAGENT STRIP/BLOOD GLUCOSE: CPT

## 2018-06-21 RX ORDER — AMLODIPINE BESYLATE 5 MG/1
5 TABLET ORAL DAILY
Qty: 30 TABLET | Refills: 0 | Status: SHIPPED | OUTPATIENT
Start: 2018-06-22 | End: 2018-07-11 | Stop reason: SDUPTHER

## 2018-06-21 RX ADMIN — ATORVASTATIN CALCIUM 80 MG: 80 TABLET, FILM COATED ORAL at 09:04

## 2018-06-21 RX ADMIN — ASPIRIN 81 MG 81 MG: 81 TABLET ORAL at 09:04

## 2018-06-21 RX ADMIN — Medication 400 MG: at 09:04

## 2018-06-21 RX ADMIN — PANTOPRAZOLE SODIUM 40 MG: 40 TABLET, DELAYED RELEASE ORAL at 06:07

## 2018-06-21 RX ADMIN — POTASSIUM CHLORIDE 10 MEQ: 750 TABLET, EXTENDED RELEASE ORAL at 09:04

## 2018-06-21 RX ADMIN — POLYETHYLENE GLYCOL 3350 17 G: 17 POWDER, FOR SOLUTION ORAL at 09:05

## 2018-06-21 RX ADMIN — Medication 325 MG: at 09:04

## 2018-06-21 RX ADMIN — AMIODARONE HYDROCHLORIDE 200 MG: 200 TABLET ORAL at 09:04

## 2018-06-21 RX ADMIN — INSULIN GLARGINE 20 UNITS: 100 INJECTION, SOLUTION SUBCUTANEOUS at 09:03

## 2018-06-21 RX ADMIN — LORATADINE 10 MG: 10 TABLET ORAL at 09:04

## 2018-06-21 RX ADMIN — FLUTICASONE PROPIONATE 1 PUFF: 110 AEROSOL, METERED RESPIRATORY (INHALATION) at 09:05

## 2018-06-21 RX ADMIN — LEVOTHYROXINE SODIUM 100 MCG: 100 TABLET ORAL at 06:07

## 2018-06-21 RX ADMIN — INSULIN LISPRO 5 UNITS: 100 INJECTION, SOLUTION INTRAVENOUS; SUBCUTANEOUS at 10:45

## 2018-06-21 RX ADMIN — RIVAROXABAN 20 MG: 20 TABLET, FILM COATED ORAL at 09:04

## 2018-06-21 RX ADMIN — FLUTICASONE PROPIONATE 1 SPRAY: 50 SPRAY, METERED NASAL at 09:04

## 2018-06-21 RX ADMIN — AMLODIPINE BESYLATE 5 MG: 5 TABLET ORAL at 09:04

## 2018-06-21 RX ADMIN — CHOLECALCIFEROL TAB 25 MCG (1000 UNIT) 1000 UNITS: 25 TAB at 09:04

## 2018-06-21 RX ADMIN — FUROSEMIDE 40 MG: 40 TABLET ORAL at 09:04

## 2018-06-21 RX ADMIN — LEVETIRACETAM 750 MG: 750 TABLET ORAL at 09:04

## 2018-06-21 RX ADMIN — Medication 1000 MG: at 09:04

## 2018-06-21 NOTE — PLAN OF CARE
Problem: DISCHARGE PLANNING - CARE MANAGEMENT  Goal: Discharge to post-acute care or home with appropriate resources  INTERVENTIONS:  - Conduct assessment to determine patient/family and health care team treatment goals, and need for post-acute services based on payer coverage, community resources, and patient preferences, and barriers to discharge  - Address psychosocial, clinical, and financial barriers to discharge as identified in assessment in conjunction with the patient/family and health care team  - Arrange appropriate level of post-acute services according to patient's   needs and preference and payer coverage in collaboration with the physician and health care team  - Communicate with and update the patient/family, physician, and health care team regarding progress on the discharge plan  - Arrange appropriate transportation to post-acute venue    Outcome: Adequate for Discharge

## 2018-06-21 NOTE — TELEPHONE ENCOUNTER
Pt called requesting refill of Lisinopril  After reviewing chart I see cardiologist refills and manages med  Returned call to the pt and lmom asking that she call them for refill

## 2018-06-21 NOTE — DISCHARGE INSTRUCTIONS
Stop taking 100 mg of metoprolol tartrate twice daily and start taking 12 5 mg metoprolol tartrate twice daily  You will also be starting 5 mg of amlodipine daily to help control your blood pressure  Please follow up with her primary care provider within 1 week of discharge and continue to monitor your heart rate on your medications  A-fib (Atrial Fibrillation)   WHAT YOU NEED TO KNOW:   A-fib may come and go, or it may be a long-term condition  A-fib can cause blood clots, stroke, or heart failure  These conditions may become life-threatening  It is important to treat and manage a-fib to help prevent a blood clot, stroke, or heart failure  DISCHARGE INSTRUCTIONS:   Call 911 for any of the following:   · You have any of the following signs of a heart attack:      ¨ Squeezing, pressure, or pain in your chest that lasts longer than 5 minutes or returns    ¨ Discomfort or pain in your back, neck, jaw, stomach, or arm     ¨ Trouble breathing    ¨ Nausea or vomiting    ¨ Lightheadedness or a sudden cold sweat, especially with chest pain or trouble breathing    · You have any of the following signs of a stroke:      ¨ Numbness or drooping on one side of your face     ¨ Weakness in an arm or leg    ¨ Confusion or difficulty speaking    ¨ Dizziness, a severe headache, or vision loss  Return to the emergency department if:  You have any of the following signs of a blood clot:  · You feel lightheaded, are short of breath, and have chest pain  · You cough up blood  · You have swelling, redness, pain, or warmth in your arm or leg  Contact your cardiologist or healthcare provider if:   · Your target heart rate is not in the range it should be  · You have new or worsening swelling in your legs, feet, ankles, or abdomen  · You are short of breath, even at rest      · You have questions or concerns about your condition or care  Medicines:   You may need any of the following:  · Heart medicines  help control your heart rate and rhythm  You may need more than one medicine to treat your symptoms  · Blood thinners    help prevent blood clots  Examples of blood thinners include heparin and warfarin  Clots can cause strokes, heart attacks, and death  The following are general safety guidelines to follow while you are taking a blood thinner:    ¨ Watch for bleeding and bruising while you take blood thinners  Watch for bleeding from your gums or nose  Watch for blood in your urine and bowel movements  Use a soft washcloth on your skin, and a soft toothbrush to brush your teeth  This can keep your skin and gums from bleeding  If you shave, use an electric shaver  Do not play contact sports  ¨ Tell your dentist and other healthcare providers that you take anticoagulants  Wear a bracelet or necklace that says you take this medicine  ¨ Do not start or stop any medicines unless your healthcare provider tells you to  Many medicines cannot be used with blood thinners  ¨ Tell your healthcare provider right away if you forget to take the medicine, or if you take too much  ¨ Warfarin  is a blood thinner that you may need to take  The following are things you should be aware of if you take warfarin  § Foods and medicines can affect the amount of warfarin in your blood  Do not make major changes to your diet while you take warfarin  Warfarin works best when you eat about the same amount of vitamin K every day  Vitamin K is found in green leafy vegetables and certain other foods  Ask for more information about what to eat when you are taking warfarin  § You will need to see your healthcare provider for follow-up visits when you are on warfarin  You will need regular blood tests  These tests are used to decide how much medicine you need  · Antiplatelets , such as aspirin, help prevent blood clots  Take your antiplatelet medicine exactly as directed  These medicines make it more likely for you to bleed or bruise  If you are told to take aspirin, do not take acetaminophen or ibuprofen instead  · Take your medicine as directed  Contact your healthcare provider if you think your medicine is not helping or if you have side effects  Tell him or her if you are allergic to any medicine  Keep a list of the medicines, vitamins, and herbs you take  Include the amounts, and when and why you take them  Bring the list or the pill bottles to follow-up visits  Carry your medicine list with you in case of an emergency  Follow up with your cardiologist as directed: You will need regular blood tests and monitoring  Write down your questions so you remember to ask them during your visits  Manage A-fib:   · Know your target heart rate  Learn how to take your pulse and monitor your heart rate  · Manage other health conditions  This includes high blood pressure, sleep apnea, thyroid disease, diabetes, and other heart conditions  Take medicine as directed and follow your treatment plan  · Limit or do not drink alcohol  Alcohol can make a-fib hard to manage  Ask your healthcare provider if it is safe for you to drink alcohol  A drink of alcohol is 12 ounces of beer, 5 ounces of wine, or 1½ ounces of liquor  · Do not smoke  Nicotine and other chemicals in cigarettes and cigars can cause heart and lung damage  Ask your healthcare provider for information if you currently smoke and need help to quit  E-cigarettes or smokeless tobacco still contain nicotine  Talk to your healthcare provider before you use these products  · Eat heart-healthy foods  Heart healthy foods will help keep your cholesterol low  These include fruits, vegetables, whole-grain breads, low-fat dairy products, beans, lean meats, and fish  Replace butter and margarine with heart-healthy oils such as olive oil and canola oil  · Maintain a healthy weight  Ask your healthcare provider how much you should weigh   Ask him to help you create a weight loss plan if you are overweight  · Exercise for 30 minutes  most days of the week  Ask your healthcare provider about the best exercise plan for you  © 2017 [address] [employer] Information is for End User's use only and may not be sold, redistributed or otherwise used for commercial purposes  All illustrations and images included in CareNotes® are the copyrighted property of [employer]  or [address]  The above information is an  only  It is not intended as medical advice for individual conditions or treatments  Talk to your doctor, nurse or pharmacist before following any medical regimen to see if it is safe and effective for you

## 2018-06-21 NOTE — SOCIAL WORK
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

## 2018-06-21 NOTE — DISCHARGE SUMMARY
[facility] Discharge Summary - Medical Sera Yun [de-identified] y o  female MRN: [id number]    [facility] Room / Bed: [place] [address] 211-02 Encounter: [id number]    BRIEF OVERVIEW    Admitting Provider: Jack Dobson MD  Discharge Provider: Jack Dobson MD  Primary Care Physician at Discharge: Dr Javier Dias    Discharge To:  home    Admission Date: 6/19/2018     Discharge Date: 6/21/2018    Primary Discharge Diagnosis  Principal Problem:    Bradycardia  Active Problems:    Essential hypertension    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 native heart without angina pectoris    Type 2 diabetes mellitus with complication, with long-term current use of insulin (HCC)    Atrial flutter (HCC)    Hyperlipidemia    Gastroesophageal reflux disease without esophagitis    Asthma    Chronic anticoagulation    Chronic combined systolic and diastolic congestive heart failure (HCC)    Paroxysmal atrial fibrillation (HCC)    Prolonged Q-T interval on ECG    Left bundle branch block (LBBB)    1st degree AV block    S/P TAVR (transcatheter aortic valve replacement)    History of CVA (cerebrovascular accident)    Pulmonary hypertension ([address] )    Anemia    Hypomagnesemia    Chest pain  Resolved Problems:    * No resolved hospital problems  *      Other Problems Addressed: none    Consulting Providers   none    Therapeutic Operative Procedures Performed  none    Diagnostic Procedures Performed  X-ray Chest 2 Views    Result Date: 6/19/2018  Impression: Stable cardiomegaly  No acute cardiopulmonary disease   Workstation performed: [id number]       Results from last 7 days  Lab Units 06/20/18  0439 06/19/18  1717   SODIUM mmol/L 138 136   POTASSIUM mmol/L 3 8 4 6   CHLORIDE mmol/L 104 102   CO2 mmol/L 27 26   BUN mg/dL 19 24   CREATININE mg/dL 0 98 1 09   CALCIUM mg/dL 8 0* 8 2*   TOTAL PROTEIN g/dL  --  7 8   BILIRUBIN TOTAL mg/dL  --  0 40   ALK PHOS U/L  --  62   ALT U/L  --  20   AST U/L  --  20   GLUCOSE RANDOM mg/dL 70 143*     Discharge Disposition: home  Discharged With Lines: no    Test Results Pending at Discharge: none    Outpatient Follow-Up  Patient is to follow up with her primary care provider within 1 week of discharge  Follow up with consulting providers  none required   Active Issues Requiring Follow-up   none    Code Status: Level 1 - Full Code  Advance Directive and Living Will: <no information>  Power of :    POLST:      Medications   Discontinue metoprolol tartrate  Continue amiodarone 200 mg twice daily  81 mg aspirin daily  80 mg atorvastatin daily  Tums  Vitamin-D  Iron supplementation  Flonase  Flovent  20 units Lantus twice daily  750 mg Keppra twice daily  100 mcg levothyroxine daily  5 mg lisinopril daily  Claritin  Magnesium  Metformin 1000 mg twice daily  Reglan as needed prior to meals  Protonix 40 mg daily  10 mEq potassium daily  Rivaroxaban 20 mg daily  Pyridium 200mg three times daily    Allergies  Allergies   Allergen Reactions    Other Chest Pain     IVP-listed as "chest pain" in previous chart, patient stated this occurred "a long time ago" but does not remember exactly occurred     Cephalosporins Chest Pain    Contrast [Iodinated Diagnostic Agents] Other (See Comments)     Flash pulm edema    Doxycycline Chest Pain    Levaquin [Levofloxacin] Chest Pain    Ondansetron Chest Pain     Prolonged QT    Toradol [Ketorolac Tromethamine] Chest Pain     Discharge Diet: regular diet  Activity restrictions: none    [address] [facility]  Ms Gabrielle Davis is a [de-identified] y o  female with significant past medical history of hypertension, atrial fibrillation/flutter on Xarelto, microcytic anemia, seizure disorder, CAD status post CABG, aortic stenosis status post have our, CVA, type 2 diabetes mellitus, GERD, hyperlipidemia, combined systolic/diastolic heart failure with an EF of 35%    She presented to the emergency department on 06/19 with complaints of chest pain earlier in the morning which resolved on its own without requiring nitroglycerin or anything else  As she was being evaluated by her home nurse during the day she was noted to be significantly bradycardic and was therefore brought to her PCPs office for evaluation  At that time she was noted to remain to be bradycardic with a heart rate of 52 and a stable 1st degree AV block with prolonged QTC of 503 and a stable left bundle branch block and left axis deviation  She was brought to the emergency department for further evaluation  Troponins were noted to be negative x3, on telemetry she was noted to be bradycardic and therefore beta-blockers were held at that time  Over the next 24 hours, her heart rate improved to 50s and 60s on telemetry monitoring without any significant events  On day of discharge she was stable for discharge and instructed to discontinue her beta-blockers to allow for more appropriate heart rate and to follow up with her primary care provider with respect to her heart rate and chronic medications following discharge  She was initiated on amlodipine for better blood pressure control by time of discharge  Patient is to continue at home on 5 mg amlodipine as well as 12 5 mg metoprolol tartrate twice daily  Physical examination on day of discharge:  Vitals:    06/21/18 1100   BP: 134/65   Pulse: 56   Resp: 18   Temp: 97 9 °F (36 6 °C)   SpO2: 94%     Physical examination on day of discharge:  General:  55-year-old female, lying comfortably in bed, no acute distress  Chest:  Nontender  Heart:  Regular rate rhythm, bradycardic, positive S1/S2, no murmurs/rubs/gallops  Lungs:  Clear to auscultation bilaterally, nonlabored breathing, no wheeze/crackles/rhonchi  abdomen:  Soft, nontender, positive bowel sounds in all 4 quadrants  Extremities:  Full and active range of motion in all 4 extremities, 2+ pulses in radial and dorsalis pedis bilaterally  Neuro:   Answer questions appropriately, fluent speech, AAO x3  Presenting Problem/History of Present Illness  Principal Problem:    Bradycardia  Active Problems:    Essential hypertension    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 native heart without angina pectoris    Type 2 diabetes mellitus with complication, with long-term current use of insulin (HCC)    Atrial flutter (HCC)    Hyperlipidemia    Gastroesophageal reflux disease without esophagitis    Asthma    Chronic anticoagulation    Chronic combined systolic and diastolic congestive heart failure (HCC)    Paroxysmal atrial fibrillation (HCC)    Prolonged Q-T interval on ECG    Left bundle branch block (LBBB)    1st degree AV block    S/P TAVR (transcatheter aortic valve replacement)    History of CVA (cerebrovascular accident)    Pulmonary hypertension ([address] )    Anemia    Hypomagnesemia    Chest pain  Resolved Problems:    * No resolved hospital problems  *        Other Pertinent Test Results  none     Discharge Condition: good    Discharge  Statement   I spent 30 minutes minutes discharging the patient  This time was spent on the day of discharge  I had direct contact with the patient on the day of discharge

## 2018-06-21 NOTE — PHYSICIAN ADVISOR
Current patient class: Observation  The patient is currently on Hospital Day: 3 at [address] [place] [address]        The patient was admitted to the hospital  on N/A at N/A for the following diagnosis:  Mixed hyperlipidemia [E78 2]  Hypomagnesemia [E83 42]  Bradycardia [R00 1]  Paroxysmal atrial fibrillation (HCC) [I48 0]  Chest pain [R07 9]  Essential hypertension [I10]  Anemia [D64 9]  ACS (acute coronary syndrome) (HCC) [I24 9]  Pulmonary hypertension ([facility] ) [I27 20]  Hx of CABG [Z95 1]  Chronic combined systolic and diastolic congestive heart failure (HCC) [I50 42]  Left bundle branch block (LBBB) [I44 7]  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 native heart without angina pectoris [I25 10]  Type 2 diabetes mellitus with complication, with long-term current use of insulin ([facility] ) [E11 8, Z79 4]  At risk for acute ischemic cardiac event [Z91 89]       There is documentation in the medical record of an expected length of stay of at least 2 midnights  The patient is therefore expected to satisfy the 2 midnight benchmark and given the 2 midnight presumption is appropriate for INPATIENT ADMISSION  Given this expectation of a satisfying stay, CMS instructs us that the patient is most often appropriate for inpatient admission under part A provided medical necessity is documented in the chart  After review of the relevant documentation, labs, vital signs and test results, the patient is appropriate for INPATIENT ADMISSION  Admission to the hospital as an inpatient is a complex decision making process which requires the practitioner to consider the patients presenting complaint, history and physical examination and all relevant testing  With this in mind, in this case, the patient was deemed appropriate for INPATIENT ADMISSION   After review of the documentation and testing available at the time of the admission I concur with this clinical determination of medical necessity  The patient does have an inpatient admission within the previous 30 days  The patient was admitted on 5/23/18 and discharged on 5/26/18 as an inpatient  The patient therefore required readmission review  In this case the patient should be considered a SEPARATE and UNRELATED INPATIENT ADMISSION  The patient had been discharged in stable condition with a completed care plan  There were no unresolved acute medical issues at the time of discharged which would have reasonably been expected to prompt this readmission  Rationale is as follows: The patient is a [de-identified] yrs   Female who presented to the ED at 6/19/2018  4:55 PM with a chief complaint of Chest Pain (Pt had chest pain and SOB starting this morning at 0400  Pt was seen at her family doctor and was advised to be seen here in the ED  Pt denies chest pain and SOB at present  )     The patient had a prior admission from 5/23-5/26 for vomiting and diarrhea for which she was treated and discharged in stable condition  The patient presented on this admission with chest pain  The patient felt as if there was an elephant sitting on her chest  The patient in the ER was also noted to be bradycardic with a pulse in the 40s  The patient also had electrolyte abnormalities with a magnesium level of 1 1  The patient is being admitted with chest pain, bradycardia, and prolonged QT interval  The plan of care includes telemetry monitoring, serial cardiac enzymes, adjusting home medications, cardiology consultation  This patient is appropriate for INPATIENT admission as her length of stay is expected to be at least 2 midnights; continued hospitalization is necessary to ensure stabilization of her clinical status  This admission is UNRELATED to her prior admission for nausea and vomiting for which she was treated and discharged in stable condition         The patients vitals on arrival were ED Triage Vitals   Temperature Pulse Respirations Blood Pressure SpO2 06/19/18 1703 06/19/18 1703 06/19/18 1703 06/19/18 1703 06/19/18 1703   98 1 °F (36 7 °C) (!) 49 18 164/79 97 %      Temp Source Heart Rate Source Patient Position - Orthostatic VS BP Location FiO2 (%)   06/19/18 1703 06/19/18 2304 06/19/18 2012 06/19/18 2012 --   Oral Monitor Lying Left arm       Pain Score       06/19/18 1703       No Pain           Past Medical History:   Diagnosis Date    Altered mental status     Anticoagulated     Coumadin for Aflutter    Asthma     Atrial flutter (HCC)     maintained on coumadin anticoag    CAD (coronary artery disease)     Candidiasis     Carotid stenosis, bilateral     Cataract     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) [facility]     Chronic fatigue syndrome     Chronic low back pain     Concussion w loss of consciousness of unsp duration, init     Coronary artery disease     CVA (cerebral vascular accident) ([facility] ) 4/17/2018    Diabetes mellitus ([facility] )     type 2, insulin dependent    DJD (degenerative joint disease)     GERD (gastroesophageal reflux disease)     Herpes zoster     HLD (hyperlipidemia)     HTN (hypertension)     Hyperlipidemia     Hypothyroidism     Irritable bowel syndrome     Lumbar radiculopathy     Lyme disease     Dx in hospital 8/2011    Lyme disease     MI (myocardial infarction) ([facility] )     Migraines     Muscle spasm     Non-neoplastic nevus     Nontoxic multinodular goiter     OA (osteoarthritis)     Osteoarthritis     Other chronic pain     PAD (peripheral artery disease) [facility]     Palpitations     Pseudogout     Seizures ([facility] )     Spinal stenosis     Transient cerebral ischemia     Trigger ring finger     Viral gastroenteritis      Past Surgical History:   Procedure Laterality Date    APPENDECTOMY      ARTERIOGRAM  12/19/2017    CARDIAC CATHETERIZATION      CHOLECYSTECTOMY      COLONOSCOPY      CORONARY ARTERY BYPASS GRAFT  2004    LUMBAR LAMINECTOMY      TX ECHO TRANSESOPHAG R-T 2D W/PRB IMG ACQUISJ I&R N/A 4/3/2018    Procedure: TRANSESOPHAGEAL ECHOCARDIOGRAM (ROBB); Surgeon: Mateo Lovett DO;  Location: [facility];  Service: Cardiac Surgery    RI REPLACE AORTIC VALVE OPENFEMORAL ARTERY APPROACH N/A 4/3/2018    Procedure: REPLACEMENT AORTIC VALVE TRANSCATHETER (TAVR) TRANSFEMORAL w/ 26MM IVY YEVGENIY S3 VALVE;  Surgeon: Mateo Lovett DO;  Location: [facility];  Service: Cardiac Surgery    THYROIDECTOMY      TOTAL ABDOMINAL HYSTERECTOMY W/ BILATERAL SALPINGOOPHORECTOMY             Consults have been placed to:   IP CONSULT TO CASE MANAGEMENT    Vitals:    06/20/18 1458 06/20/18 1913 06/20/18 2352 06/21/18 0326   BP: 136/60 138/63 143/82 150/70   BP Location: Right arm Right arm Right arm Right arm   Pulse: (!) 50 57 63 63   Resp: 18 18 18 18   Temp: 98 1 °F (36 7 °C) 98 6 °F (37 °C) 98 7 °F (37 1 °C) 97 8 °F (36 6 °C)   TempSrc: Oral Oral Oral Oral   SpO2: 91% 95% 94%    Weight:       Height:           Most recent labs:    Recent Labs      06/19/18   1717   06/20/18   0439  06/21/18   0430   WBC  10 88*   --   9 36  7 94   HGB  9 8*   --   9 2*  9 8*   HCT  33 1*   --   30 7*  32 3*   PLT  295   --   249  230   K  4 6   --   3 8   --    NA  136   --   138   --    CALCIUM  8 2*   --   8 0*   --    BUN  24   --   19   --    CREATININE  1 09   --   0 98   --    TROPONINI  <0 02   < >  <0 02   --    AST  20   --    --    --    ALT  20   --    --    --    ALKPHOS  62   --    --    --    BILITOT  0 40   --    --    --     < > = values in this interval not displayed         Scheduled Meds:  Current Facility-Administered Medications:  acetaminophen 650 mg Oral Q6H PRN Duane Robert, DO   albuterol 2 puff Inhalation Q4H PRN Duane Robert, DO   amiodarone 200 mg Oral BID Veena Triana MD   amLODIPine 5 mg Oral Daily Rita Ann MD   aspirin 81 mg Oral Daily Max Jean-Baptiste DO   atorvastatin 80 mg Oral Daily Max Jean-Baptiste DO   calcium carbonate 1,000 mg Oral TID Max Oswaldo Barry, DO   cholecalciferol 1,000 Units Oral BID Gulshan Nelly, DO   ferrous sulfate 325 mg Oral Daily With Breakfast Max Jean-Baptiste, DO   fluticasone 1 spray Nasal Daily Max Jean-Baptiste, DO   fluticasone 1 puff Inhalation BID Gulshan Nelly, DO   furosemide 40 mg Oral Daily Max Jean-Baptiste, DO   hydrALAZINE 5 mg Intravenous Q4H PRN Rich Blew, DO   insulin glargine 20 Units Subcutaneous BID Gulshan Nelly, DO   insulin lispro 1-5 Units Subcutaneous TID AC Max Barry, DO   levETIRAcetam 750 mg Oral Q12H [address] Gulshan Nelly, DO   levothyroxine 100 mcg Oral Early Morning Max Jean-Baptiste, DO   lisinopril 5 mg Oral HS Max Jean-Baptiste, DO   loratadine 10 mg Oral Daily Max Jean-Baptiste, DO   magnesium oxide 400 mg Oral BID Gulshan Nelly, DO   melatonin 3 mg Oral HS PRN Trell Brice PA-C   nitroglycerin 0 4 mg Sublingual Q5 Min PRN Gulshan Nelly, DO   pantoprazole 40 mg Oral Early Morning Max Jean-Baptiste, DO   polyethylene glycol 17 g Oral Daily Max Jean-Baptiste, DO   potassium chloride 10 mEq Oral Daily Gulshan Nelly, DO   rivaroxaban 20 mg Oral Daily Max Jean-Baptiste, DO   sodium chloride (PF) 3 mL Intravenous PRN Dick Clark DO     Continuous Infusions:   PRN Meds:   acetaminophen    albuterol    hydrALAZINE    melatonin    nitroglycerin    Insert peripheral IV **AND** sodium chloride (PF)    Surgical procedures (if appropriate):

## 2018-06-21 NOTE — MEDICAL STUDENT
MEDICAL STUDENT  Inpatient Progress Note for TRAINING ONLY  Not Part of Legal Medical Record       Progress Note - Tiffanie Held [de-identified] y o  female MRN: [id number]    Unit/Bed#: CW2 211-02 Encounter: [id number]      Assessment/Plan:  Chest pain              - Had chest pain that resolved on its own and is not similar to previous  episodes of chest pain              - EKG did not reveal ST elevations and series troponins are all negative               - Continue to monitor on telemetry   Bradycardia              - Heart rate was in the 40s with an unknown cause considering her heart rate  is usually faster due to history at atrial flutter   - Heart rate is currently in the low 60s              - Hold metoprolol for now and will consider discharging either completely off  or on half the dose                - Continue to monitor heart rate  Prolonged QT              - QT was 503 on ECG               - Will hold reglan since can prolong QT   Hypomagnesemia              - In ED magnesium was 1 1 and she was given 1 g of magnesium and it is            now stable at 2 0 this morning                - Will continue to monitor  Hypertension              - continue Lisinopril and Lasix              - Started amiodarone 200 mg BID due to elevated blood pressure readings  - Restarted amlodipine 5 mg    - Still having the occasional high blood pressure readings but typically are  around 138/70  Hyperlipidemia              - Continue current medications   Type 2 diabetes mellitus              - Continue Lantus 20 units BID    Subjective:   Brady Solis is an [de-identified]year old female with a PMH of atrial flutter, CAD post CABG many years ago, aortic stenosis post valve replacement, DM, GERD, HTN, HLD, and CHF who presents following an episode of chest pain yesterday morning and found to be bradycardic  Today she denies any acute events overnight  She states that she is feeling well   She does have some mild back pain but contributes that to the hospital bed  She denies any fevers/chills, chest pain, palpitations, SOB, headaches, dizziness, N/V, diarrhea, constipation, or abdominal pain  Objective:     Vitals: Blood pressure (!) 194/80, pulse 62, temperature 98 2 °F (36 8 °C), temperature source Oral, resp  rate 18, height 5' 4" (1 626 m), weight 70 4 kg (155 lb 3 3 oz), SpO2 94 %, not currently breastfeeding  ,Body mass index is 26 64 kg/m²  Intake/Output Summary (Last 24 hours) at 06/21/18 0910  Last data filed at 06/21/18 0501   Gross per 24 hour   Intake              540 ml   Output             1150 ml   Net             -610 ml       Physical Exam:   General: well-developed and well-nourished female sitting up on the edge of bed in no acute distress  HEENT: Normocephalic and atraumatic  PERRLA  Cardio: Regular rhythm  Bradycardic, systolic murmur heard  PV: 2+ pulses bilaterally in upper and lower extremities  Lungs: Clear to auscultation no wheezes, rales, or rhonchi  Abdomen: Soft, non-distended  Normoactive bowel sounds  No tenderness to palpation in all for quadrants  Neuro: Alert and oriented x3   Skin: No rashes noted  Does have multiple scabbed wound on her feet and toes       Lab Results   Component Value Date    WBC 7 94 06/21/2018    HGB 9 8 (L) 06/21/2018    HCT 32 3 (L) 06/21/2018    MCV 73 (L) 06/21/2018     06/21/2018       Invasive Devices     Peripheral Intravenous Line            Peripheral IV 06/19/18 Left Forearm 1 day

## 2018-06-21 NOTE — CASE MANAGEMENT
Continued Stay Review    Date: 6/21/18 AT 1400 ADMIT INPATIENT  ( 6/19/18 AT 1850 OBSERVATION)    06/21/18 1400  Inpatient Admission Once     Transfer Service: General Medicine       Question Answer Comment   Admitting Physician [facility]    Level of Care Med Surg    Estimated length of stay More than 2 Midnights    Certification I certify that inpatient services are medically necessary for this patient for a duration of greater than two midnights   See H&P and MD Progress Notes for additional information about the patient's course of treatment         06/21/18 1400       Vital Signs: /65 (BP Location: Right arm)   Pulse 56   Temp 97 9 °F (36 6 °C) (Oral)   Resp 18   Ht 5' 4" (1 626 m)   Wt 70 4 kg (155 lb 3 3 oz)   LMP  (LMP Unknown)   SpO2 94%   BMI 26 64 kg/m²      06/20 0701  06/21 0700 06/21 0701  06/21 1520  Most Recent    Temperature (°F) 97 798 7 97 9  97 9 (36 6)    Pulse 4963 56  56    Respirations 18 18  18    Blood Pressure 136/60194/80 134/65156/74  134/65    SpO2 (%) 9195 94  94        Diet Cardiac; Cardiac TLC 2 3 GM NA       IV ACCESS    Medications:   Scheduled Meds:   Current Facility-Administered Medications:  acetaminophen 650 mg Oral Q6H PRN Juleen Code, DO   albuterol 2 puff Inhalation Q4H PRN Juleen Code, DO   amiodarone 200 mg Oral BID Luther Mejia MD   amLODIPine 5 mg Oral Daily Peace Moran MD   aspirin 81 mg Oral Daily Max Jean-Baptiste, DO   atorvastatin 80 mg Oral Daily Juleen Code, DO   calcium carbonate 1,000 mg Oral TID Juleen Code, DO   cholecalciferol 1,000 Units Oral BID Juleen Code, DO   ferrous sulfate 325 mg Oral Daily With Breakfast Max Jean-Baptiste, DO   fluticasone 1 spray Nasal Daily Max Jean-Baptiste, DO   fluticasone 1 puff Inhalation BID Juleen Code, DO   furosemide 40 mg Oral Daily Max Jean-Baptiste, DO   hydrALAZINE 5 mg Intravenous Q4H PRN Alton Arevalo, DO   insulin glargine 20 Units Subcutaneous BID Juleen Code, DO   insulin lispro 1-5 Units Subcutaneous TID AC Max Ulloa, DO   levETIRAcetam 750 mg Oral Q12H [facility] Hernán Barroslle, DO   levothyroxine 100 mcg Oral Early Morning Max Jean-Baptiste, DO   lisinopril 5 mg Oral HS Max Jean-Baptiste, DO   loratadine 10 mg Oral Daily Max Jean-Baptiste, DO   magnesium oxide 400 mg Oral BID Hernán Tamela, DO   melatonin 3 mg Oral HS PRN [employer]le[employer], PA-C   nitroglycerin 0 4 mg Sublingual Q5 Min PRN Hernán Tamela, DO   pantoprazole 40 mg Oral Early Morning Max Jean-Baptiste, DO   polyethylene glycol 17 g Oral Daily Max Jean-Baptiste, DO   potassium chloride 10 mEq Oral Daily Hernán Tamela, DO   rivaroxaban 20 mg Oral Daily Max Jean-Baptiste, DO   sodium chloride (PF) 3 mL Intravenous PRN Dick Clark, DO       PRN Meds:     acetaminophen    albuterol    hydrALAZINE    melatonin    nitroglycerin    Insert peripheral IV **AND** sodium chloride (PF)      LABS/Diagnostic Results:   CBC Results from last 7 days  Lab Units 06/19/18  1717   WBC Thousand/uL 10 88*   RBC Million/uL 4 50   HEMOGLOBIN g/dL 9 8*   HEMATOCRIT % 33 1*   MCV fL 74*   MCH pg 21 8*   MCHC g/dL 29 6*   RDW % 21 6*   MPV fL 10 5   PLATELETS Thousands/uL 295   NRBC AUTO /100 WBCs 0   NEUTROS PCT % 59   LYMPHS PCT % 20   MONOS PCT % 10   EOS PCT % 9*   BASOS PCT % 1   NEUTROS ABS Thousands/µL 6 58   LYMPHS ABS Thousands/µL 2 15   MONOS ABS Thousand/µL 1 05   EOS ABS Thousand/µL 0 93*     CMP  Results from last 7 days  Lab Units 06/19/18  1717   SODIUM mmol/L 136   POTASSIUM mmol/L 4 6   CHLORIDE mmol/L 102   CO2 mmol/L 26   ANION GAP mmol/L 8   BUN mg/dL 24   CREATININE mg/dL 1 09   GLUCOSE RANDOM mg/dL 143*   CALCIUM mg/dL 8 2*   AST U/L 20   ALT U/L 20   ALK PHOS U/L 62   TOTAL PROTEIN g/dL 7 8   BILIRUBIN TOTAL mg/dL 0 40   EGFR ml/min/1 73sq m 48        Age/Sex: [de-identified] y o  female       Assessment/Plan (Per recent Int Med MD note):   ATTENDING NOTE:  Will hold beta-blocker and amiodarone for now  Mimi Santos wait for cardiology's recommendation   Continue to check troponin  Mimi Santos continue to monitor electrolytes and hemoglobin)     RESIDENT   Chest pain - single episode of chest pain this morning that lasted for approximately 1 hour and resolved on its own   Patient notes this is not typical of the chest pain that she had before with previous MI   EKG shows no acute ischemic changes   Troponin is negative x1   -trend troponins  -repeat EKG  -monitor on telemetry  -continue aspirin  -continue statin  -nitroglycerin SL p r n      Bradycardia - patient with heart rate in the 40s   Unknown cause at this time   Patient is on a few medications that could be contributing including amiodarone and metoprolol   -hold home amiodarone   -hold home metoprolol  -replete electrolytes as needed  -monitor on telemetry  -consider consulting Cardiology     Prolonged QT - QTc 503 on EKG today   Patient had recently been started on Reglan for vomiting after eating, likely gastroparesis  -hold Reglan      Hypomagnesemia - magnesium of 1 1 on presentation   She received 1 g magnesium in the ED  -2 g magnesium now  -recheck magnesium in the morning     Microcytic anemia - chronic, stable  -continue home ferrous sulfate     Atrial fibrillation on Xarelto - patient had been on amiodarone and metoprolol   We will hold these in the setting of bradycardia   -continue Xarelto  -monitor on telemetry     Type 2 diabetes mellitus - relatively well controlled with A1c of 7 7 in April 2018  -hold metformin  -continue Lantus 20 units b i d   -add sliding scale insulin for correctional coverage     GERD  -continue Protonix     UTI on course of antibiotics    -continue amoxicillin     Hypertension  -continue lisinopril  -continue Lasix  -add hydralazine 5 mg p r n  for SBP above 180     Hyperlipidemia  -continue statin     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% - patient did endorse episode of shortness of breath while walking into her PCPs office this morning  Madison Oakes, patient appears euvolemic at this point   -continue Lasix 40 mg daily  -continue ACE-inhibitor  -will hold beta-blocker in the setting of bradycardia     CAD status post CABG  -continue statin  -continue aspirin  -continue ACE-inhibitor     Seizure disorder-chronic, stable  -continue Keppra     Asthma - no wheezing on exam  -continue albuterol p r n      Code Status: Level 1 - Full Code    VTE Pharmacologic Prophylaxis: Reason for no pharmacologic prophylaxis on Eliquis   VTE Mechanical Prophylaxis: sequential compression device           Discharge Plan:   [address] [facility]    CASE MANAGEMENT FOLLOWING CLOSELY FOR ALL DISCHARGE NEEDS

## 2018-06-21 NOTE — PLAN OF CARE
Problem: Potential for Falls  Goal: Patient will remain free of falls  INTERVENTIONS:  - Assess patient frequently for physical needs  -  Identify cognitive and physical deficits and behaviors that affect risk of falls  -  [place] fall precautions as indicated by assessment   - Educate patient/family on patient safety including physical limitations  - Instruct patient to call for assistance with activity based on assessment  - Modify environment to reduce risk of injury  - Consider OT/PT consult to assist with strengthening/mobility   Outcome: Progressing      Problem: Nutrition/Hydration-ADULT  Goal: Nutrient/Hydration intake appropriate for improving, restoring or maintaining nutritional needs  Monitor and assess patient's nutrition/hydration status for malnutrition (ex- brittle hair, bruises, dry skin, pale skin and conjunctiva, muscle wasting, smooth red tongue, and disorientation)  Collaborate with interdisciplinary team and initiate plan and interventions as ordered  Monitor patient's weight and dietary intake as ordered or per policy  Utilize nutrition screening tool and intervene per policy  Determine patient's food preferences and provide high-protein, high-caloric foods as appropriate       INTERVENTIONS:  - Monitor oral intake, urinary output, labs, and treatment plans  - Assess nutrition and hydration status and recommend course of action  - Evaluate amount of meals eaten  - Assist patient with eating if necessary   - Allow adequate time for meals  - Recommend/ encourage appropriate diets, oral nutritional supplements, and vitamin/mineral supplements  - Order, calculate, and assess calorie counts as needed  - Recommend, monitor, and adjust tube feedings and TPN/PPN based on assessed needs  - Assess need for intravenous fluids  - Provide specific nutrition/hydration education as appropriate  - Include patient/family/caregiver in decisions related to nutrition   Outcome: Progressing      Problem: PAIN - ADULT  Goal: Verbalizes/displays adequate comfort level or baseline comfort level  Interventions:  - Encourage patient to monitor pain and request assistance  - Assess pain using appropriate pain scale  - Administer analgesics based on type and severity of pain and evaluate response  - Implement non-pharmacological measures as appropriate and evaluate response  - Consider cultural and social influences on pain and pain management  - Notify physician/advanced practitioner if interventions unsuccessful or patient reports new pain   Outcome: Progressing      Problem: INFECTION - ADULT  Goal: Absence or prevention of progression during hospitalization  INTERVENTIONS:  - Assess and monitor for signs and symptoms of infection  - Monitor lab/diagnostic results  - Monitor all insertion sites, i e  indwelling lines, tubes, and drains  - Monitor endotracheal (as able) and nasal secretions for changes in amount and color  - [place] appropriate cooling/warming therapies per order  - Administer medications as ordered  - Instruct and encourage patient and family to use good hand hygiene technique  - Identify and instruct in appropriate isolation precautions for identified infection/condition   Outcome: Progressing    Goal: Absence of fever/infection during neutropenic period  INTERVENTIONS:  - Monitor WBC  - Implement neutropenic guidelines   Outcome: Progressing      Problem: SAFETY ADULT  Goal: Maintain or return to baseline ADL function  INTERVENTIONS:  -  Assess patient's ability to carry out ADLs; assess patient's baseline for ADL function and identify physical deficits which impact ability to perform ADLs (bathing, care of mouth/teeth, toileting, grooming, dressing, etc )  - Assess/evaluate cause of self-care deficits   - Assess range of motion  - Assess patient's mobility; develop plan if impaired  - Assess patient's need for assistive devices and provide as appropriate  - Encourage maximum independence but intervene and supervise when necessary  ¯ Involve family in performance of ADLs  ¯ Assess for home care needs following discharge   ¯ Request OT consult to assist with ADL evaluation and planning for discharge  ¯ Provide patient education as appropriate   Outcome: Progressing    Goal: Maintain or return mobility status to optimal level  INTERVENTIONS:  - Assess patient's baseline mobility status (ambulation, transfers, stairs, etc )    - Identify cognitive and physical deficits and behaviors that affect mobility  - Identify mobility aids required to assist with transfers and/or ambulation (gait belt, sit-to-stand, lift, walker, cane, etc )  - [place] fall precautions as indicated by assessment  - Record patient progress and toleration of activity level on Mobility SBAR; progress patient to next Phase/Stage  - Instruct patient to call for assistance with activity based on assessment  - Request Rehabilitation consult to assist with strengthening/weightbearing, etc    Outcome: Progressing      Problem: DISCHARGE PLANNING  Goal: Discharge to home or other facility with appropriate resources  INTERVENTIONS:  - Identify barriers to discharge w/patient and caregiver  - Arrange for needed discharge resources and transportation as appropriate  - Identify discharge learning needs (meds, wound care, etc )  - Arrange for interpretive services to assist at discharge as needed  - Refer to Case Management Department for coordinating discharge planning if the patient needs post-hospital services based on physician/advanced practitioner order or complex needs related to functional status, cognitive ability, or social support system   Outcome: Progressing      Problem: Knowledge Deficit  Goal: Patient/family/caregiver demonstrates understanding of disease process, treatment plan, medications, and discharge instructions  Complete learning assessment and assess knowledge base    Interventions:  - Provide teaching at level of understanding  - Provide teaching via preferred learning methods   Outcome: Progressing      Problem: CARDIOVASCULAR - ADULT  Goal: Absence of cardiac dysrhythmias or at baseline rhythm  INTERVENTIONS:  - Continuous cardiac monitoring, monitor vital signs, obtain 12 lead EKG if indicated  - Administer antiarrhythmic and heart rate control medications as ordered  - Monitor electrolytes and administer replacement therapy as ordered   Outcome: Progressing      Problem: METABOLIC, FLUID AND ELECTROLYTES - ADULT  Goal: Glucose maintained within target range  INTERVENTIONS:  - Monitor Blood Glucose as ordered  - Assess for signs and symptoms of hyperglycemia and hypoglycemia  - Administer ordered medications to maintain glucose within target range  - Assess nutritional intake and initiate nutrition service referral as needed   Outcome: Progressing      Problem: SKIN/TISSUE INTEGRITY - ADULT  Goal: Skin integrity remains intact  INTERVENTIONS  - Identify patients at risk for skin breakdown  - Assess and monitor skin integrity  - Assess and monitor nutrition and hydration status  - Monitor labs (i e  albumin)  - Assess for incontinence   - Turn and reposition patient  - Assist with mobility/ambulation  - Relieve pressure over bony prominences  - Avoid friction and shearing  - Provide appropriate hygiene as needed including keeping skin clean and dry  - Evaluate need for skin moisturizer/barrier cream  - Collaborate with interdisciplinary team (i e  Nutrition, Rehabilitation, etc )   - Patient/family teaching   Outcome: Progressing    Goal: Incision(s), wounds(s) or drain site(s) healing without S/S of infection  INTERVENTIONS  - Assess and document risk factors for skin impairment   - Assess and document dressing, incision, wound bed, drain sites and surrounding tissue  - Initiate Nutrition services consult and/or wound management as needed   Outcome: Progressing

## 2018-06-21 NOTE — SOCIAL WORK
TC from [facility] stating stating pt's son unaware of transport home  CM s/w pt again whom stated she s/w son Harris Cleaning whom is making arrangements  Left  w [employer]aning  Called son Baltazar Duverney whom stated pt's son-in-law Craige  is transporting pt home & will bet there in 1 hr  Updated RN & pt

## 2018-06-22 ENCOUNTER — TRANSITIONAL CARE MANAGEMENT (OUTPATIENT)
Dept: INTERNAL MEDICINE CLINIC | Facility: CLINIC | Age: 78
End: 2018-06-22

## 2018-06-27 ENCOUNTER — OFFICE VISIT (OUTPATIENT)
Dept: INTERNAL MEDICINE CLINIC | Age: 78
End: 2018-06-27
Payer: MEDICARE

## 2018-06-27 VITALS
SYSTOLIC BLOOD PRESSURE: 134 MMHG | DIASTOLIC BLOOD PRESSURE: 68 MMHG | TEMPERATURE: 97.8 F | HEART RATE: 60 BPM | OXYGEN SATURATION: 97 %

## 2018-06-27 DIAGNOSIS — L97.909 ATHEROSCLEROSIS OF ARTERY OF EXTREMITY WITH ULCERATION (HCC): Primary | ICD-10-CM

## 2018-06-27 DIAGNOSIS — I70.299 ATHEROSCLEROSIS OF ARTERY OF EXTREMITY WITH ULCERATION (HCC): Primary | ICD-10-CM

## 2018-06-27 DIAGNOSIS — Z79.4 TYPE 2 DIABETES MELLITUS WITH COMPLICATION, WITH LONG-TERM CURRENT USE OF INSULIN (HCC): Primary | ICD-10-CM

## 2018-06-27 DIAGNOSIS — I99.8 ISCHEMIC FOOT: ICD-10-CM

## 2018-06-27 DIAGNOSIS — E11.8 TYPE 2 DIABETES MELLITUS WITH COMPLICATION, WITH LONG-TERM CURRENT USE OF INSULIN (HCC): Primary | ICD-10-CM

## 2018-06-27 PROBLEM — M79.673 FOOT PAIN: Status: ACTIVE | Noted: 2018-06-27

## 2018-06-27 PROCEDURE — 99215 OFFICE O/P EST HI 40 MIN: CPT | Performed by: INTERNAL MEDICINE

## 2018-06-27 RX ORDER — BLOOD SUGAR DIAGNOSTIC
STRIP MISCELLANEOUS 2 TIMES DAILY
Qty: 200 EACH | Refills: 3 | Status: SHIPPED | OUTPATIENT
Start: 2018-06-27

## 2018-06-27 RX ORDER — TRAMADOL HYDROCHLORIDE 50 MG/1
50 TABLET ORAL EVERY 6 HOURS PRN
Qty: 120 TABLET | Refills: 1 | Status: SHIPPED | OUTPATIENT
Start: 2018-06-27 | End: 2018-07-27

## 2018-06-27 NOTE — ASSESSMENT & PLAN NOTE
Her embolic infarctions are likely due to her prior stroke, as noted above  She has been doing well without any recurrent symptoms, and is now on Xarelto for anticoagulation  She will continue to follow with Cardiology and with her PCP for ongoing management of her risk factors

## 2018-06-27 NOTE — PROGRESS NOTES
Assessment/Plan:    No problem-specific Assessment & Plan notes found for this encounter  Diagnoses and all orders for this visit:    Atherosclerosis of artery of extremity with ulceration ([facility] )      Is history of peripheral vascular disease,  Coronary artery disease hypertension diabetes mellitus presented to the office with the bilateral forefoot redness ulceration callus formation and the pre gangrene she is complaining of lot of pain in that foot in the daytime and also at nighttime she denying any burning,  But complaining of tingling and numbness no fever or chills    Subjective:     Bilateral forefoot pain ulcer  and redness   Patient ID: Emerald Bryant is a [de-identified] y o  female  HPI   please see above  The following portions of the patient's history were reviewed and updated as appropriate: allergies, current medications, past family history, past medical history, past social history, past surgical history and problem list     Review of Systems   Constitutional: Positive for fatigue  Musculoskeletal:        Pain in the feet,  Redness swelling and ulceration of the both forefeet   Neurological: Positive for numbness           Past Medical History:   Diagnosis Date    Altered mental status     Anticoagulated     Coumadin for Aflutter    Asthma     Atrial flutter (HCC)     maintained on coumadin anticoag    CAD (coronary artery disease)     Candidiasis     Carotid stenosis, bilateral     Cataract     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) (HCC)     Chronic fatigue syndrome     Chronic low back pain     Concussion w loss of consciousness of unsp duration, init     Coronary artery disease     CVA (cerebral vascular accident) ([facility] ) 4/17/2018    Diabetes mellitus ([facility] )     type 2, insulin dependent    DJD (degenerative joint disease)     GERD (gastroesophageal reflux disease)     Herpes zoster     HLD (hyperlipidemia)     HTN (hypertension)     Hyperlipidemia     Hypothyroidism     Irritable bowel syndrome     Lumbar radiculopathy     Lyme disease     Dx in hospital 8/2011    Lyme disease     MI (myocardial infarction) ([facility] )     Migraines     Muscle spasm     Non-neoplastic nevus     Nontoxic multinodular goiter     OA (osteoarthritis)     Osteoarthritis     Other chronic pain     PAD (peripheral artery disease) (HCC)     Palpitations     Pseudogout     Seizures (HCC)     Spinal stenosis     Transient cerebral ischemia     Trigger ring finger     Viral gastroenteritis          Current Outpatient Prescriptions:     acetaminophen (TYLENOL) 650 mg CR tablet, Take 1 tablet (650 mg total) by mouth every 8 (eight) hours as needed for mild pain Do not take in conjunction with Percocet , Disp: 90 tablet, Rfl: 0    albuterol (VENTOLIN HFA) 90 mcg/act inhaler, Inhale 108 mcg 2 (two) times a day as needed 2 puffs bid PRN, Disp: , Rfl:     amiodarone 200 mg tablet, Take 1 tablet (200 mg total) by mouth 2 (two) times a day, Disp: 60 tablet, Rfl: 3    amLODIPine (NORVASC) 5 mg tablet, Take 1 tablet (5 mg total) by mouth daily, Disp: 30 tablet, Rfl: 0    aspirin 81 MG tablet, Take 81 mg by mouth daily, Disp: , Rfl:     atorvastatin (LIPITOR) 80 mg tablet, Take 1 tablet (80 mg total) by mouth daily, Disp: 30 tablet, Rfl: 2    calcium carbonate (TUMS) 500 mg chewable tablet, Chew 2 tablets 3 (three) times a day  , Disp: , Rfl:     cholecalciferol (VITAMIN D3) 1,000 units tablet, Take 1,000 Units by mouth 2 (two) times a day  , Disp: , Rfl:     esomeprazole (NEXIUM) 20 mg capsule, Take 20 mg by mouth every morning before breakfast, Disp: , Rfl:     ferrous sulfate 325 (65 Fe) mg tablet, Take 1 tablet (325 mg total) by mouth daily with breakfast, Disp: 90 tablet, Rfl: 1    fluticasone (FLONASE) 50 mcg/act nasal spray, 1 spray into each nostril daily, Disp: , Rfl:     furosemide (LASIX) 40 mg tablet, Take 1 tablet (40 mg total) by mouth daily, Disp: 90 tablet, Rfl: 3    glucose blood test strip, 1 each by Other route 4 (four) times a day (before meals and at bedtime) Use as instructed, Disp: , Rfl:     LANTUS 100 UNIT/ML subcutaneous injection, Inject 20 Units under the skin 2 (two) times a day, Disp: 10 mL, Rfl: 0    levETIRAcetam (KEPPRA) 750 mg tablet, Take 1 tablet (750 mg total) by mouth every 12 (twelve) hours, Disp: 60 tablet, Rfl: 11    levothyroxine 100 mcg tablet, Take 1 tablet (100 mcg total) by mouth daily, Disp: 90 tablet, Rfl: 1    lisinopril (ZESTRIL) 5 mg tablet, Take 1 tablet (5 mg total) by mouth daily, Disp: 90 tablet, Rfl: 3    loratadine (CLARITIN) 10 mg tablet, Take 1 tablet (10 mg total) by mouth daily, Disp: 90 tablet, Rfl: 1    magnesium oxide (MAG-OX) 400 mg, Take 1 tablet (400 mg total) by mouth 2 (two) times a day, Disp: 180 tablet, Rfl: 1    metFORMIN (GLUCOPHAGE) 1000 MG tablet, 1,000 mg 2 (two) times a day, Disp: , Rfl:     metoclopramide (REGLAN) 10 mg tablet, Take 0 5 tablets (5 mg total) by mouth every 6 (six) hours as needed (Nausea and vomiting), Disp: 30 tablet, Rfl: 1    metoprolol tartrate (LOPRESSOR) 25 mg tablet, Take 0 5 tablets (12 5 mg total) by mouth every 12 (twelve) hours, Disp: 60 tablet, Rfl: 0    Mometasone Furoate (ASMANEX HFA) 100 MCG/ACT AERO, Inhale 2 puffs once daily, Disp: 1 Inhaler, Rfl: 5    nitroglycerin (NITROSTAT) 0 4 mg SL tablet, Place 0 4 mg under the tongue every 3 (three) minutes as needed, Disp: , Rfl:     polyethylene glycol (MIRALAX) 17 g packet, Take 17 g by mouth daily as needed  , Disp: , Rfl:     potassium chloride (K-DUR,KLOR-CON) 10 mEq tablet, Take 1 tablet (10 mEq total) by mouth daily, Disp: 30 tablet, Rfl: 1    rivaroxaban (XARELTO) 20 mg tablet, Take 1 tablet (20 mg total) by mouth daily, Disp: 90 tablet, Rfl: 0    phenazopyridine (PYRIDIUM) 200 mg tablet, Take 1 tablet (200 mg total) by mouth 3 (three) times a day with meals, Disp: 10 tablet, Rfl: 0    Allergies   Allergen Reactions    Other Chest Pain     IVP-listed as "chest pain" in previous chart, patient stated this occurred "a long time ago" but does not remember exactly occurred     Cephalosporins Chest Pain    Contrast [Iodinated Diagnostic Agents] Other (See Comments)     Flash pulm edema    Doxycycline Chest Pain    Levaquin [Levofloxacin] Chest Pain    Ondansetron Chest Pain     Prolonged QT    Toradol [Ketorolac Tromethamine] Chest Pain       Social History   Past Surgical History:   Procedure Laterality Date    APPENDECTOMY      ARTERIOGRAM  12/19/2017    CARDIAC CATHETERIZATION      CHOLECYSTECTOMY      COLONOSCOPY      CORONARY ARTERY BYPASS GRAFT  2004    LUMBAR LAMINECTOMY      MT ECHO TRANSESOPHAG R-T 2D W/PRB IMG ACQUISJ I&R N/A 4/3/2018    Procedure: TRANSESOPHAGEAL ECHOCARDIOGRAM (ROBB); Surgeon: Cheryle Paulino DO;  Location: [facility];  Service: Cardiac Surgery    MT REPLACE AORTIC VALVE OPENFEMORAL ARTERY APPROACH N/A 4/3/2018    Procedure: REPLACEMENT AORTIC VALVE TRANSCATHETER (TAVR) TRANSFEMORAL w/ 26MM IVY YEVGENIY S3 VALVE;  Surgeon: Cheryle Paulino DO;  Location: [facility];  Service: Cardiac Surgery    THYROIDECTOMY      TOTAL ABDOMINAL HYSTERECTOMY W/ BILATERAL SALPINGOOPHORECTOMY       Family History   Problem Relation Age of Onset    Cancer Mother     Stroke Mother     Cancer Father     Heart disease Father     Breast cancer Sister     Diabetes Daughter         Passed away January 2017        Objective:  /68 (BP Location: Left arm, Patient Position: Sitting, Cuff Size: Standard)   Pulse 60   Temp 97 8 °F (36 6 °C) (Tympanic)   LMP  (LMP Unknown)   SpO2 97%        Physical Exam   Constitutional: She is oriented to person, place, and time  She appears well-developed and well-nourished     HENT:   Right Ear: External ear normal    Mouth/Throat: Oropharynx is clear and moist    Eyes: Conjunctivae and EOM are normal  Pupils are equal, round, and reactive to light  Neck: Normal range of motion  No JVD present  No thyromegaly present  Cardiovascular: S1 normal, S2 normal and intact distal pulses  An irregular rhythm present  Murmur heard  Systolic murmur is present with a grade of 2/6   Pulmonary/Chest: Breath sounds normal    Abdominal: Soft  Bowel sounds are normal    Musculoskeletal: Normal range of motion  Lymphadenopathy:     She has no cervical adenopathy  Neurological: She is alert and oriented to person, place, and time  She has normal reflexes  Skin: Skin is dry  Bilateral forefoot redness swelling multiple ulcerations and also poor capillary refill,  Pulses in the dorsalis pedis and posterior tibials are not palpable but positive by the Doppler,  No signs of infection no discharge of the pus but I am very much sure that it is a pre gangrenous foot   Psychiatric: She has a normal mood and affect   Her behavior is normal  Judgment and thought content normal

## 2018-06-27 NOTE — ASSESSMENT & PLAN NOTE
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

## 2018-06-28 ENCOUNTER — TELEPHONE (OUTPATIENT)
Dept: INTERNAL MEDICINE CLINIC | Age: 78
End: 2018-06-28

## 2018-06-28 NOTE — TELEPHONE ENCOUNTER
SMOOTH Mukherjee from [facility] called to state that she will be seeing the patient once a week for Occupational Therapy

## 2018-07-04 DIAGNOSIS — I70.299 OTHER ATHEROSCLEROSIS OF NATIVE ARTERIES OF EXTREMITIES, UNSPECIFIED EXTREMITY (HCC): ICD-10-CM

## 2018-07-05 ENCOUNTER — HOSPITAL ENCOUNTER (OUTPATIENT)
Dept: NON INVASIVE DIAGNOSTICS | Facility: CLINIC | Age: 78
Discharge: HOME | End: 2018-07-05
Payer: MEDICARE

## 2018-07-05 DIAGNOSIS — I70.25 ATHEROSCLEROSIS OF NATIVE ARTERY OF LOWER EXTREMITY WITH ULCERATION OF FOOT (HCC): ICD-10-CM

## 2018-07-05 DIAGNOSIS — L97.509 ATHEROSCLEROSIS OF NATIVE ARTERY OF LOWER EXTREMITY WITH ULCERATION OF FOOT (HCC): ICD-10-CM

## 2018-07-05 PROCEDURE — 93923 UPR/LXTR ART STDY 3+ LVLS: CPT

## 2018-07-05 PROCEDURE — 93922 UPR/L XTREMITY ART 2 LEVELS: CPT | Performed by: SURGERY

## 2018-07-05 PROCEDURE — 93925 LOWER EXTREMITY STUDY: CPT

## 2018-07-05 PROCEDURE — 93925 LOWER EXTREMITY STUDY: CPT | Performed by: SURGERY

## 2018-07-06 ENCOUNTER — TELEPHONE (OUTPATIENT)
Dept: INTERNAL MEDICINE CLINIC | Age: 78
End: 2018-07-06

## 2018-07-06 NOTE — TELEPHONE ENCOUNTER
HOANGI::[facility] nurse called stating that she was going to discharge Yanni from PT--the patient is in a lot of pain with her feet and is unable to do any of the exercises that are needed to be done  Tracey Enrike did have a vascular test done yesterday-but she did not know what the outcome was  They will continue with OT therapy and will  again on PT if needed and when her feet are better

## 2018-07-09 ENCOUNTER — OFFICE VISIT (OUTPATIENT)
Dept: INTERNAL MEDICINE CLINIC | Facility: CLINIC | Age: 78
End: 2018-07-09
Payer: MEDICARE

## 2018-07-09 ENCOUNTER — TELEPHONE (OUTPATIENT)
Dept: INTERNAL MEDICINE CLINIC | Age: 78
End: 2018-07-09

## 2018-07-09 VITALS
HEIGHT: 64 IN | TEMPERATURE: 97.9 F | WEIGHT: 155.8 LBS | BODY MASS INDEX: 26.6 KG/M2 | RESPIRATION RATE: 20 BRPM | HEART RATE: 59 BPM | SYSTOLIC BLOOD PRESSURE: 122 MMHG | OXYGEN SATURATION: 96 % | DIASTOLIC BLOOD PRESSURE: 60 MMHG

## 2018-07-09 DIAGNOSIS — E83.51 HYPOCALCEMIA: ICD-10-CM

## 2018-07-09 DIAGNOSIS — L97.909 ATHEROSCLEROSIS OF ARTERY OF EXTREMITY WITH ULCERATION (HCC): ICD-10-CM

## 2018-07-09 DIAGNOSIS — M79.671 PAIN IN BOTH FEET: ICD-10-CM

## 2018-07-09 DIAGNOSIS — Z79.4 TYPE 2 DIABETES MELLITUS WITH COMPLICATION, WITH LONG-TERM CURRENT USE OF INSULIN (HCC): Primary | ICD-10-CM

## 2018-07-09 DIAGNOSIS — E11.8 TYPE 2 DIABETES MELLITUS WITH COMPLICATION, WITH LONG-TERM CURRENT USE OF INSULIN (HCC): Primary | ICD-10-CM

## 2018-07-09 DIAGNOSIS — I70.299 ATHEROSCLEROSIS OF ARTERY OF EXTREMITY WITH ULCERATION (HCC): ICD-10-CM

## 2018-07-09 DIAGNOSIS — D64.9 ANEMIA, UNSPECIFIED TYPE: ICD-10-CM

## 2018-07-09 DIAGNOSIS — K64.9 HEMORRHOIDS, UNSPECIFIED HEMORRHOID TYPE: ICD-10-CM

## 2018-07-09 DIAGNOSIS — M79.672 PAIN IN BOTH FEET: ICD-10-CM

## 2018-07-09 DIAGNOSIS — R11.2 NON-INTRACTABLE VOMITING WITH NAUSEA, UNSPECIFIED VOMITING TYPE: ICD-10-CM

## 2018-07-09 DIAGNOSIS — M54.50 ACUTE BILATERAL LOW BACK PAIN WITHOUT SCIATICA: ICD-10-CM

## 2018-07-09 DIAGNOSIS — E87.6 HYPOKALEMIA: ICD-10-CM

## 2018-07-09 LAB
ANION GAP SERPL CALCULATED.3IONS-SCNC: 8 MMOL/L (ref 4–13)
BASOPHILS # BLD AUTO: 0.04 THOUSANDS/ΜL (ref 0–0.1)
BASOPHILS NFR BLD AUTO: 0 % (ref 0–1)
BUN SERPL-MCNC: 19 MG/DL (ref 5–25)
CALCIUM SERPL-MCNC: 8.5 MG/DL (ref 8.3–10.1)
CHLORIDE SERPL-SCNC: 101 MMOL/L (ref 100–108)
CO2 SERPL-SCNC: 27 MMOL/L (ref 21–32)
CREAT SERPL-MCNC: 1.09 MG/DL (ref 0.6–1.3)
EOSINOPHIL # BLD AUTO: 0.33 THOUSAND/ΜL (ref 0–0.61)
EOSINOPHIL NFR BLD AUTO: 3 % (ref 0–6)
ERYTHROCYTE [DISTWIDTH] IN BLOOD BY AUTOMATED COUNT: 21.6 % (ref 11.6–15.1)
GFR SERPL CREATININE-BSD FRML MDRD: 48 ML/MIN/1.73SQ M
GLUCOSE SERPL-MCNC: 104 MG/DL (ref 65–140)
HCT VFR BLD AUTO: 37.1 % (ref 34.8–46.1)
HGB BLD-MCNC: 11.3 G/DL (ref 11.5–15.4)
IMM GRANULOCYTES # BLD AUTO: 0.05 THOUSAND/UL (ref 0–0.2)
IMM GRANULOCYTES NFR BLD AUTO: 1 % (ref 0–2)
LYMPHOCYTES # BLD AUTO: 1.83 THOUSANDS/ΜL (ref 0.6–4.47)
LYMPHOCYTES NFR BLD AUTO: 19 % (ref 14–44)
MCH RBC QN AUTO: 23.2 PG (ref 26.8–34.3)
MCHC RBC AUTO-ENTMCNC: 30.5 G/DL (ref 31.4–37.4)
MCV RBC AUTO: 76 FL (ref 82–98)
MONOCYTES # BLD AUTO: 0.9 THOUSAND/ΜL (ref 0.17–1.22)
MONOCYTES NFR BLD AUTO: 9 % (ref 4–12)
NEUTROPHILS # BLD AUTO: 6.53 THOUSANDS/ΜL (ref 1.85–7.62)
NEUTS SEG NFR BLD AUTO: 68 % (ref 43–75)
NRBC BLD AUTO-RTO: 0 /100 WBCS
PLATELET # BLD AUTO: 268 THOUSANDS/UL (ref 149–390)
PMV BLD AUTO: 11.2 FL (ref 8.9–12.7)
POTASSIUM SERPL-SCNC: 4.5 MMOL/L (ref 3.5–5.3)
RBC # BLD AUTO: 4.87 MILLION/UL (ref 3.81–5.12)
SODIUM SERPL-SCNC: 136 MMOL/L (ref 136–145)
WBC # BLD AUTO: 9.68 THOUSAND/UL (ref 4.31–10.16)

## 2018-07-09 PROCEDURE — 99214 OFFICE O/P EST MOD 30 MIN: CPT | Performed by: NURSE PRACTITIONER

## 2018-07-09 PROCEDURE — 36415 COLL VENOUS BLD VENIPUNCTURE: CPT | Performed by: NURSE PRACTITIONER

## 2018-07-09 PROCEDURE — 80048 BASIC METABOLIC PNL TOTAL CA: CPT | Performed by: NURSE PRACTITIONER

## 2018-07-09 PROCEDURE — 85025 COMPLETE CBC W/AUTO DIFF WBC: CPT | Performed by: NURSE PRACTITIONER

## 2018-07-09 RX ORDER — PANTOPRAZOLE SODIUM 40 MG/1
40 TABLET, DELAYED RELEASE ORAL DAILY
COMMUNITY
End: 2018-09-07

## 2018-07-09 NOTE — TELEPHONE ENCOUNTER
Attn:  Dr Lali Manzo     Pt  Son in law Mary Kay Lucero) called and wanted to know the status of patients last visit with Dr Lali Harry stated that Dr Lali Manzo promised to speak with patients neurologist and foot doctor, and hoped to come up with a plan to manage patient foot pain  Sayra Harry states that the patient is managing the pain with the prescribed meds, but can't continue with it for much longer  Please call Sayra Harry back as soon as possible        Thanks,

## 2018-07-09 NOTE — PROGRESS NOTES
Assessment/Plan:      I did tell patient's son to call me tomorrow if he has not heard from me regarding an appointment with Dr Francis Guevara is not in the office this week but he is available I phone should he we needed to further discuss patient's condition with vascular surgery  Diagnoses and all orders for this visit:    Atherosclerosis of artery of extremity with ulceration ([address] )            I did place a phone call to Dr Madhu Pederson office and discussed arterial duplex results with what the nurses in the office  She stated patient will need a earlier appointment but she was unable to give me an appointment while I was on the phone with her  She stated that she will discuss this with her  and give me a call back with an earlier opening  I did tell her that patient is having pain and did request an as soon as possible appointment for patient  Pain in both feet          Patient does have tramadol at home but this is not helping with her foot pain  I told patient that she will need some type of vascular surgery to reduce her pain and that pain medication will not work for the condition that she has  Will await appointment with Dr Francis Lucas  Acute bilateral low back pain without sciatica          I recommended that patient try over-the-counter icy Hot on her lower back  I do not want to add any additional medications to patient's regimen at this time  Hemorrhoids, unspecified hemorrhoid type             Recommended the patient approaches over-the-counter preparation H and uses directed  Patient was instructed to let our office know if this is does not improve her symptoms  Non-intractable vomiting with nausea, unspecified vomiting type    Patient is already on Protonix 40 mg for her acid reflux and has Reglan to be used as needed  I recommended that patient take Reglan 0 5 hour prior to eating and as needed    I have ordered blood work to assess for any abnormality causing patient's nausea and vomiting   -     Basic metabolic panel  -     CBC and differential    Hypocalcemia  -     Basic metabolic panel  -     CBC and differential    Hypokalemia  -     Basic metabolic panel  -     CBC and differential    Anemia, unspecified type    The patient's most recent hemoglobin was drawn while she was in the hospital on June 20th  Her hemoglobin was 9 8 at that time she will have CBC drawn today  -     CBC and differential    Other orders  -     pantoprazole (PROTONIX) 40 mg tablet; Take 40 mg by mouth daily        Subjective:      Patient ID: Carline Del Castillo is a [de-identified] y o  female  Patient has been seen for an acute visit due to nausea and abdominal pain  Patient is accompanied by her son , Atlu Hawk  Patient has been in and out the hospital over the past few months  According to both her and her son, she has had nausea on and off since her last discharge from the hospital   CT scan of the abdomen done in May revealed gastritis but no other acute findings at that time  Currently is on Protonix 40 mg daily and does have Reglan to be taken as needed for nausea  Patient had 1 episode of nausea today for which Reglan was helpful  She cannot place what her triggers are for nausea and vomiting  She seems to think it is more when she has not eaten but she does vomit after eating at times  Patient did have 2 bowel movements today  She denies constipation or diarrhea  She does complain of a hemorrhoid  Patient was recently seen by Dr Neville Cuba at that time she presented with bilateral redness, ulcerations, callous formation on feet  She did have pre gangrene and was complaining of a lot of foot pain  Patient denied burning  She does have tingling and numbness in her feet  Patient denies fevers, chills  She does have increased fatigued  Patient did undergo arterial duplex on 07/05 which revealed "bilateral great toe pressures that were unable to be obtained     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 which is a new finding "  This study was read by Dr Gloria Adams Doctor, who is the patient's vascular surgeon  The patient is also complaining of bilateral lower back pain  Patient's son feels that back pain may be related to the fact that she has difficulty walking due to bilateral foot pain  Patient denies radiation of pain  I did speak with Dr Nan Church the over the telephone while patient was in the office today  Dr Nan Church brought the a was under the impression that patient was going to be seen Dr Yesenia Tompkins Doctor on the same day as her arterial study  Per patient and her son, patient was not seen by Dr Yesenia Tompkins  I did call Dr Kelley Baca office and was told by 1 of the nurses there that she will need to call me back after she speaks with the   Patient does need an appointment to further discuss options regarding her vascular disease  I did give Dr Gloria Adams office my cell phone number and they will be calling me for an appointment  The following portions of the patient's history were reviewed and updated as appropriate: allergies, current medications, past family history, past medical history, past social history, past surgical history and problem list     Review of Systems   Constitutional: Positive for appetite change (on and off) and fatigue  HENT: Negative  Eyes: Negative  Respiratory: Negative for cough, chest tightness, shortness of breath and wheezing  Cardiovascular: Negative for chest pain, palpitations and leg swelling  Gastrointestinal: Positive for abdominal pain  Negative for abdominal distention, constipation, diarrhea, nausea and vomiting  Genitourinary: Negative for difficulty urinating, dysuria, flank pain, frequency, hematuria and urgency  Musculoskeletal: Positive for back pain (bilateral lower back pain)  Skin: Negative      Neurological: Positive for dizziness and headaches (slight headache at times  )  Negative for syncope and light-headedness  Hematological: Negative for adenopathy  Does not bruise/bleed easily  Psychiatric/Behavioral: Negative  Objective:      /60 (BP Location: Left arm, Patient Position: Sitting, Cuff Size: Large)   Pulse 59   Temp 97 9 °F (36 6 °C) (Oral)   Resp 20   Ht 5' 4" (1 626 m) Comment: with shoes on  Wt 70 7 kg (155 lb 12 8 oz) Comment: with shoes on  LMP  (LMP Unknown)   SpO2 96% Comment: room air  BMI 26 74 kg/m²          Physical Exam   Constitutional: She is oriented to person, place, and time  She appears well-developed and well-nourished  No distress  HENT:   Head: Normocephalic and atraumatic  Eyes: Conjunctivae and EOM are normal  Pupils are equal, round, and reactive to light  Neck: Normal range of motion  Neck supple  No JVD present  No thyromegaly present  Cardiovascular: Normal rate  An irregularly irregular rhythm present  Exam reveals decreased pulses  Distal pulses are markedly decreased to absent  Pulmonary/Chest: Effort normal and breath sounds normal  She has no wheezes  She has no rales  Abdominal: Soft  Bowel sounds are normal  There is tenderness (epigastric area  )  Musculoskeletal: She exhibits no edema or tenderness  Lymphadenopathy:     She has no cervical adenopathy  Neurological: She is alert and oriented to person, place, and time  No cranial nerve deficit  Skin: Skin is warm and dry  Bilateral redness, swelling, with multiple ulcerations and also poor capillary refill,  No signs of infection no discharge  Questionable pre-gangrene of right middle toe  Psychiatric: She has a normal mood and affect   Her behavior is normal  Judgment and thought content normal

## 2018-07-09 NOTE — PATIENT INSTRUCTIONS
Take Reglan 1/2 hour before eating  Use Preparation H for your hemorrhoids  Use icy hot for your back pain  May continue taking Tramadol as needed for back pain  Await phone call from Dr Natalie Stephens office for appointment to discuss options

## 2018-07-10 ENCOUNTER — TELEPHONE (OUTPATIENT)
Dept: INTERNAL MEDICINE CLINIC | Age: 78
End: 2018-07-10

## 2018-07-10 NOTE — TELEPHONE ENCOUNTER
SMOOTH[de-identified]  [facility] OT nurse called and wanted to let us know that she is discharging the patient from OT---since the patient is having foot problems she is unable to focus properly on her OT exercises  If her foot pain issues resolve, they will start OT again  Floydene Nikki continue to follow with the patient

## 2018-07-11 ENCOUNTER — DOCUMENTATION (OUTPATIENT)
Dept: VASCULAR SURGERY | Facility: CLINIC | Age: 78
End: 2018-07-11

## 2018-07-11 ENCOUNTER — PREP FOR PROCEDURE (OUTPATIENT)
Dept: VASCULAR SURGERY | Facility: CLINIC | Age: 78
End: 2018-07-11

## 2018-07-11 ENCOUNTER — TELEPHONE (OUTPATIENT)
Dept: VASCULAR SURGERY | Facility: CLINIC | Age: 78
End: 2018-07-11

## 2018-07-11 ENCOUNTER — OFFICE VISIT (OUTPATIENT)
Dept: VASCULAR SURGERY | Facility: CLINIC | Age: 78
End: 2018-07-11
Payer: MEDICARE

## 2018-07-11 VITALS
SYSTOLIC BLOOD PRESSURE: 120 MMHG | HEIGHT: 64 IN | TEMPERATURE: 97.9 F | WEIGHT: 163 LBS | RESPIRATION RATE: 16 BRPM | HEART RATE: 60 BPM | BODY MASS INDEX: 27.83 KG/M2 | DIASTOLIC BLOOD PRESSURE: 60 MMHG

## 2018-07-11 DIAGNOSIS — L97.529 ULCER OF TOE OF LEFT FOOT, UNSPECIFIED ULCER STAGE (HCC): ICD-10-CM

## 2018-07-11 DIAGNOSIS — L97.519 ULCER OF TOE OF RIGHT FOOT, UNSPECIFIED ULCER STAGE (HCC): ICD-10-CM

## 2018-07-11 DIAGNOSIS — I10 ESSENTIAL HYPERTENSION: ICD-10-CM

## 2018-07-11 DIAGNOSIS — I70.235 ATHEROSCLEROSIS OF NATIVE ARTERY OF BOTH LOWER EXTREMITIES WITH BILATERAL ULCERATION OF OTHER PART OF FEET (HCC): Primary | ICD-10-CM

## 2018-07-11 DIAGNOSIS — E11.8 TYPE 2 DIABETES MELLITUS WITH COMPLICATION, WITH LONG-TERM CURRENT USE OF INSULIN (HCC): ICD-10-CM

## 2018-07-11 DIAGNOSIS — I70.235 ATHEROSCLEROSIS OF NATIVE ARTERY OF RIGHT LOWER EXTREMITY WITH ULCERATION OF OTHER PART OF FOOT (HCC): Primary | ICD-10-CM

## 2018-07-11 DIAGNOSIS — E87.6 HYPOKALEMIA: ICD-10-CM

## 2018-07-11 DIAGNOSIS — I65.23 ASYMPTOMATIC CAROTID ARTERY STENOSIS, BILATERAL: ICD-10-CM

## 2018-07-11 DIAGNOSIS — I70.245 ATHEROSCLEROSIS OF NATIVE ARTERY OF BOTH LOWER EXTREMITIES WITH BILATERAL ULCERATION OF OTHER PART OF FEET (HCC): Primary | ICD-10-CM

## 2018-07-11 DIAGNOSIS — Z79.4 TYPE 2 DIABETES MELLITUS WITH COMPLICATION, WITH LONG-TERM CURRENT USE OF INSULIN (HCC): ICD-10-CM

## 2018-07-11 PROBLEM — I70.239 ATHEROSCLEROSIS OF NATIVE ARTERY OF BOTH LOWER EXTREMITIES WITH BILATERAL ULCERATION (HCC): Status: ACTIVE | Noted: 2017-06-09

## 2018-07-11 PROBLEM — I70.209 ATHEROSCLEROSIS OF NATIVE ARTERY OF EXTREMITY (HCC): Status: ACTIVE | Noted: 2018-07-11

## 2018-07-11 PROBLEM — I70.249 ATHEROSCLEROSIS OF NATIVE ARTERY OF BOTH LOWER EXTREMITIES WITH BILATERAL ULCERATION (HCC): Status: ACTIVE | Noted: 2017-06-09

## 2018-07-11 PROCEDURE — 99215 OFFICE O/P EST HI 40 MIN: CPT | Performed by: SURGERY

## 2018-07-11 RX ORDER — POTASSIUM CHLORIDE 750 MG/1
10 TABLET, EXTENDED RELEASE ORAL DAILY
Qty: 30 TABLET | Refills: 5 | Status: SHIPPED | OUTPATIENT
Start: 2018-07-11 | End: 2018-07-19 | Stop reason: SDUPTHER

## 2018-07-11 RX ORDER — AMLODIPINE BESYLATE 5 MG/1
5 TABLET ORAL DAILY
Qty: 30 TABLET | Refills: 5 | Status: SHIPPED | OUTPATIENT
Start: 2018-07-11 | End: 2018-08-24 | Stop reason: HOSPADM

## 2018-07-11 RX ORDER — METHYLPREDNISOLONE 32 MG/1
32 TABLET ORAL DAILY
Qty: 2 TABLET | Refills: 0 | Status: SHIPPED | OUTPATIENT
Start: 2018-07-11 | End: 2018-08-24 | Stop reason: HOSPADM

## 2018-07-11 RX ORDER — DIPHENHYDRAMINE HCL 50 MG
50 CAPSULE ORAL ONCE
Qty: 2 CAPSULE | Refills: 0 | Status: SHIPPED | OUTPATIENT
Start: 2018-07-11 | End: 2018-08-24 | Stop reason: HOSPADM

## 2018-07-11 RX ORDER — SODIUM CHLORIDE 9 MG/ML
75 INJECTION, SOLUTION INTRAVENOUS CONTINUOUS
Status: CANCELLED | OUTPATIENT
Start: 2018-07-11 | End: 2019-07-11

## 2018-07-11 NOTE — PATIENT INSTRUCTIONS
1) Foot wounds  -I am referring you to wound care for proper care of your wounds  -we are planning an angiogram procedure for your right leg to try to improve blood flow and aid with healing  -unfortunately, there is likely nothing more we can do to improve blood flow to the left leg as there is very severe disease of the smaller blood vessels in the lower leg  -please take your dye prep as directed prior to angiogram procedure to help avoid allergy reaction    Angiogram   WHAT YOU NEED TO KNOW:   What do I need to know about an angiogram?  An angiogram is used to examine blood flow through your arteries  Arteries carry blood from your heart to your body  How do I prepare for the procedure? Your healthcare provider will tell you how to prepare for your procedure  He may tell you not to eat or drink anything after midnight on the day of your procedure  He will tell you what medicines to take or not take on the day of your procedure  Contrast liquid will be used during the procedure to help your arteries show up better in the pictures  Tell your healthcare provider if you have ever had an allergic reaction to contrast liquid  Arrange to have someone drive you home after your procedure  What will happen during the procedure? · You may be given medicine to help you relax or make you drowsy  You may get local anesthesia to numb the area where the angiogram catheter will go in  Hair may be removed from the procedure site  · A catheter will be put into an artery in your leg near your groin, or in your arm  The catheter travels through the artery to the area being studied  Contrast liquid is put through the catheter to help your blood vessels and organs show up better in the x-ray pictures  You may feel warm as the liquid is put into the catheter  You may get a headache or feel nauseated  These feelings should go away quickly      · Your healthcare providers will remove the catheter and apply pressure to the wound for several minutes  They may place a pressure bandage or other pressure device over the wound to help stop any bleeding  What will happen after the procedure? You will be on a heart monitor until you are fully awake  A heart monitor continuously records the electrical activity of your heart  Healthcare providers will frequently monitor your vital signs and pulses  They will also frequently check your wound for bleeding  Keep your leg straight  Do not  get out of bed until your healthcare provider says it is okay  After you are monitored for several hours, you may be able to go home  What are the risks of the procedure? · You may bleed heavily after your catheter is removed  The catheter may damage your artery, and you may need surgery to fix the damage  You could have kidney problems from the contrast liquid  You could have an allergic reaction to the contrast liquid or numbing medicine  · You may develop a blood clot that causes pain and swelling, and stops blood from flowing  A blood clot in your leg can break loose and travel to your lungs and become life-threatening  CARE AGREEMENT:   You have the right to help plan your care  Learn about your health condition and how it may be treated  Discuss treatment options with your caregivers to decide what care you want to receive  You always have the right to refuse treatment  The above information is an  only  It is not intended as medical advice for individual conditions or treatments  Talk to your doctor, nurse or pharmacist before following any medical regimen to see if it is safe and effective for you  © 2017 [address] [employer] Information is for End User's use only and may not be sold, redistributed or otherwise used for commercial purposes  All illustrations and images included in CareNotes® are the copyrighted property of [employer]  or [employer] Felipe Spaulding

## 2018-07-11 NOTE — PROGRESS NOTES
Assessment/Plan:    Patient is a 77 yo F w/ HTN, HLD, diastolic and systolic CHF, aflutter on Xarelto, CAD, severe aortic stenosis s/p TAVR 4/18, DM, OA, arthritis, DJD, GERD, hypothyroid, migraine, PAD w/ B nonhealing toe wounds     Diagnoses and all orders for this visit:    Atherosclerosis of native artery of both lower extremities with bilateral ulceration of other part of feet ([facility] [address] )  Type 2 diabetes mellitus with complication, with long-term current use of insulin (HCC)  Ulcer of toe of left foot, unspecified ulcer stage (HCC)  Ulcer of toe of right foot, unspecified ulcer stage ([address] )  -     Basic metabolic panel; Future  -     CBC and Platelet; Future  -     Protime-INR; Future  -     methylPREDNISolone (MEDROL) 32 MG tablet; Take 1 tablet (32 mg total) by mouth daily  -     diphenhydrAMINE (BENADRYL) 50 mg capsule; Take 1 capsule (50 mg total) by mouth once for 1 dose Take 1 hour prior to angiogram procedure for contrast allergy  -     Ambulatory referral to Wound Care;  Future  -B toe ulcers, since at least 8/17  -reviewed LEADs which shows diffuse disease without focal stenosis except <50% on distal L SFA and R: NC/37/- and L:  89/25/-   -reviewed angiogram from 12/19/17 by me, which was diagnostic; severe tibial disease without any bypass targets or reconstitution to attempt revasc; PT runoff but this doesn't even look like true PT as it is extremely tortuous; all tibial runoff ~1mm in diameter  -at this point, patient has recovered from TAVR and wounds are still nonhealing; discussed options including medical management vs intervention; discussed that given diffuse disease without focal stenosis, intervention may be diagnostic only; LLE without any good revasc options; could consider repeat agram for new SFA stenosis, but this is only <50%; patient family would like to proceed with intervention  -plan for RLE angiogram, L femoral access  -given dye prep     note: dye allergy (pt unsure of initial reaction; had flash pulm edema after dye for CTA and recent hospitalization; tolerated my angiogram and more recent cardiac cath with dye prep and no issues    Asymptomatic carotid artery stenosis, bilateral  -reviewed carotid DU which shows B ICA <50% stenosis  -will continue to monitor with yearly DU    Medications  -cont ASA/statin for best medical management; on Xarelto for aflutter    Other orders  -     Notify Physician in PT/INR greater than 1 8 and/or Creatine Clearance (gFR)  is less than 60  ml/kaleb/1 73sq m; Standing  -     Height and Weight Upon Arrival; Standing  -     Nursing communcation Apply snapless gown prior to procedure; Standing  -     Have Patient Void On Call to Procedure Room; Standing  -     Insert and Maintain IV; Standing  -     IR abdominal angiography / intervention; Standing  -     Nursing communcation Confirm last dose of any anticoagulation and antiplatelet medication and notify IR; Standing  -     Nursing communcation If patient on diabetic medications, nurse to notify physician to ensure the medications are adjusted preprocedure ; Standing  -     sodium chloride 0 9 % infusion; Infuse 75 mL/hr into a venous catheter continuous   -     IR abdominal angiography / intervention      Operative Scheduling Information:    Hospital:  IR    Physician:  Me    Surgery: RLE angiogram, L femoral access    Urgency:  Standard    Case Length:  Normal    Post-op Bed:  Outpatient    OR Table:  IR    Equipment Needs:  None    Medication Instructions:  Aspirin:   Continue (do not hold)  Xarelto:  Hold for 2 days prior to procedure    Hydration:  No      Subjective:      Patient ID: Germania Gallegos is a [de-identified] y o  female  Patient had LISBETH on 7/5  She has non-healing diabetic ulcers on the bilat hallux, L 2nd digit, R 3rd and 5th toe and medial R foot  She is treating the ulcers with Peroxide and Betadine  She has pain in the bilat feet  She denies numbness  She denies leg cramping  She denies fever or chills  HPI:    Patient was last seen by me in 3/18 when she had just been admitted for flash pulmonary edema and preop workup for TAVR  She had small dry ulcerations on both feet and plan was to have TAVR and recovery prior to further leg intervention  She has since undergone TAVR  She did not do cardiac rehab afterwards  She reports increased fatigue and doesn't feel better  Her wounds were becoming worse and she was referred back early by her PCP  Patient complains of pain at her feet and ulceration  She continues to have dry eschars which she is picking at which are larger than prior  She is walking very little and hard to tell if she is claudicating  She is not seeing wound care  She is applying peroxide and betadine  The following portions of the patient's history were reviewed and updated as appropriate: allergies, current medications, past family history, past medical history, past social history, past surgical history and problem list     Review of Systems   Constitutional: Positive for appetite change and fatigue (increased since TAVR)  HENT: Positive for hearing loss  Eyes: Negative  Respiratory: Positive for shortness of breath (with activity)  Cardiovascular: Positive for leg swelling (B feet)  Gastrointestinal: Positive for nausea  Endocrine: Negative  Genitourinary: Negative  Musculoskeletal: Positive for back pain and gait problem  Foot pain, claudication? Skin: Positive for wound  Allergic/Immunologic: Negative  Neurological: Positive for dizziness  Hematological: Negative  Psychiatric/Behavioral: Negative  Objective:      /60 (BP Location: Right arm, Patient Position: Sitting, Cuff Size: Adult)   Pulse 60   Temp 97 9 °F (36 6 °C) (Tympanic)   Resp 16   Ht 5' 4" (1 626 m)   Wt 73 9 kg (163 lb)   LMP  (LMP Unknown)   BMI 27 98 kg/m²          Physical Exam   Constitutional: She is oriented to person, place, and time   She appears well-developed and well-nourished  HENT:   Head: Normocephalic and atraumatic  Eyes: Conjunctivae are normal    Neck: Normal range of motion  Neck supple  Cardiovascular: Normal rate and regular rhythm  Murmur heard  Pulses:       Radial pulses are 0 on the right side, and 0 on the left side  Femoral pulses are 2+ on the right side, and 2+ on the left side  Popliteal pulses are 0 on the right side, and 0 on the left side  Dorsalis pedis pulses are 0 on the right side, and 0 on the left side  Posterior tibial pulses are 0 on the right side, and 0 on the left side  No carotid bruit B    Pulmonary/Chest: Effort normal and breath sounds normal  No respiratory distress  She has no wheezes  Abdominal: Soft  She exhibits no distension  There is no tenderness  There is no rebound  Musculoskeletal: Normal range of motion  She exhibits no edema  Neurological: She is alert and oriented to person, place, and time  Skin: Skin is warm and dry  B great toe and other toes with ~5mm ulcerations with dry overlying eschar; some reactive erythema of toes but does not appear infected  No odor, no drainage   Psychiatric: She has a normal mood and affect  Her behavior is normal    Nursing note and vitals reviewed          Vitals:    07/11/18 1013   BP: 120/60   BP Location: Right arm   Patient Position: Sitting   Cuff Size: Adult   Pulse: 60   Resp: 16   Temp: 97 9 °F (36 6 °C)   TempSrc: Tympanic   Weight: 73 9 kg (163 lb)   Height: 5' 4" (1 626 m)       Patient Active Problem List   Diagnosis    Essential hypertension    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 native heart without angina pectoris    Type 2 diabetes mellitus with complication, with long-term current use of insulin (HCC)    Atrial flutter (HCC)    Hyperlipidemia    Gastroesophageal reflux disease without esophagitis    Moderate mitral stenosis    Arthritis of hand    Acute respiratory failure with hypoxia ([facility] [address] )    Asthma    Asymptomatic carotid artery stenosis, bilateral    Atherosclerosis of native artery of both lower extremities with bilateral ulceration (HCC)    Hx of CABG    Chronic anticoagulation    Chronic combined systolic and diastolic congestive heart failure (HCC)    Degenerative joint disease involving multiple joints    Diabetic retinopathy (HCC)    Postoperative hypothyroidism    Migraine headache    Nephrolithiasis    Osteoarthritis of both knees    Paroxysmal atrial fibrillation (HCC)    Ulcer of toe of left foot (HCC)    Ulcer of toe of right foot (HCC)    Prolonged Q-T interval on ECG    Hypokalemia    Hypocalcemia    Left bundle branch block (LBBB)    1st degree AV block    S/P TAVR (transcatheter aortic valve replacement)    Expressive aphasia    Multiple lacunar infarcts (HCC)    Vomiting    History of CVA (cerebrovascular accident)    Seizure ([facility] [address] )    Supratherapeutic INR    NSVT (nonsustained ventricular tachycardia) (HCC)    Pulmonary hypertension (HCC)    Anemia    Severe sepsis (HCC)    Lactic acidosis    Hypomagnesemia    Bradycardia    Chest pain    Foot pain       Past Surgical History:   Procedure Laterality Date    APPENDECTOMY      ARTERIOGRAM  12/19/2017    CARDIAC CATHETERIZATION      CHOLECYSTECTOMY      COLONOSCOPY      CORONARY ARTERY BYPASS GRAFT  2004    LUMBAR LAMINECTOMY      NV ECHO TRANSESOPHAG R-T 2D W/PRB IMG ACQUISJ I&R N/A 4/3/2018    Procedure: TRANSESOPHAGEAL ECHOCARDIOGRAM (ROBB);   Surgeon: Destiny Church DO;  [facility]: [facility];  Service: Cardiac Surgery    NV REPLACE AORTIC VALVE OPENFEMORAL ARTERY APPROACH N/A 4/3/2018    Procedure: REPLACEMENT AORTIC VALVE TRANSCATHETER (TAVR) TRANSFEMORAL w/ 26MM IVY YEVGENIY S3 VALVE;  Surgeon: Destiny Church DO;  [facility];  Service: Cardiac Surgery    THYROIDECTOMY      TOTAL ABDOMINAL HYSTERECTOMY W/ BILATERAL SALPINGOOPHORECTOMY Family History   Problem Relation Age of Onset    Cancer Mother     Stroke Mother     Cancer Father     Heart disease Father     Breast cancer Sister     Diabetes Daughter         Passed away January 2017        Social History     Social History    Marital status:      Spouse name: N/A    Number of children: N/A    Years of education: N/A     Occupational History    Not on file       Social History Main Topics    Smoking status: Never Smoker    Smokeless tobacco: Never Used    Alcohol use No    Drug use: No    Sexual activity: Not on file     Other Topics Concern    Not on file     Social History Narrative    No narrative on file       Allergies   Allergen Reactions    Other Chest Pain     IVP-listed as "chest pain" in previous chart, patient stated this occurred "a long time ago" but does not remember exactly occurred     Cephalosporins Chest Pain    Contrast [Iodinated Diagnostic Agents] Other (See Comments)     Flash pulm edema    Doxycycline Chest Pain    Ketorolac Other (See Comments)     Chest pain     Levaquin [Levofloxacin] Chest Pain    Ondansetron Chest Pain     Prolonged QT    Toradol [Ketorolac Tromethamine] Chest Pain         Current Outpatient Prescriptions:     acetaminophen (TYLENOL) 650 mg CR tablet, Take 1 tablet (650 mg total) by mouth every 8 (eight) hours as needed for mild pain Do not take in conjunction with Percocet , Disp: 90 tablet, Rfl: 0    albuterol (VENTOLIN HFA) 90 mcg/act inhaler, Inhale 108 mcg 2 (two) times a day as needed 2 puffs bid PRN, Disp: , Rfl:     amiodarone 200 mg tablet, Take 1 tablet (200 mg total) by mouth 2 (two) times a day, Disp: 60 tablet, Rfl: 3    amLODIPine (NORVASC) 5 mg tablet, Take 1 tablet (5 mg total) by mouth daily, Disp: 30 tablet, Rfl: 0    aspirin 81 MG tablet, Take 81 mg by mouth daily, Disp: , Rfl:     atorvastatin (LIPITOR) 80 mg tablet, Take 1 tablet (80 mg total) by mouth daily, Disp: 30 tablet, Rfl: 2   calcium carbonate (TUMS) 500 mg chewable tablet, Chew 2 tablets 3 (three) times a day  , Disp: , Rfl:     cholecalciferol (VITAMIN D3) 1,000 units tablet, Take 1,000 Units by mouth 2 (two) times a day  , Disp: , Rfl:     esomeprazole (NEXIUM) 20 mg capsule, Take 20 mg by mouth every morning before breakfast, Disp: , Rfl:     ferrous sulfate 325 (65 Fe) mg tablet, Take 1 tablet (325 mg total) by mouth daily with breakfast, Disp: 90 tablet, Rfl: 1    fluticasone (FLONASE) 50 mcg/act nasal spray, 1 spray into each nostril daily, Disp: , Rfl:     furosemide (LASIX) 40 mg tablet, Take 1 tablet (40 mg total) by mouth daily, Disp: 90 tablet, Rfl: 3    glucose blood test strip, 1 each by Other route 4 (four) times a day (before meals and at bedtime) Use as instructed, Disp: , Rfl:     Insulin Syringe-Needle U-100 (BD INSULIN SYRINGE ULTRAFINE) 31G X 5/16" 1 ML MISC, Inject under the skin 2 (two) times a day, Disp: 200 each, Rfl: 3    LANTUS 100 UNIT/ML subcutaneous injection, Inject 20 Units under the skin 2 (two) times a day, Disp: 10 mL, Rfl: 0    levETIRAcetam (KEPPRA) 750 mg tablet, Take 1 tablet (750 mg total) by mouth every 12 (twelve) hours, Disp: 60 tablet, Rfl: 11    levothyroxine 100 mcg tablet, Take 1 tablet (100 mcg total) by mouth daily, Disp: 90 tablet, Rfl: 1    lisinopril (ZESTRIL) 5 mg tablet, Take 1 tablet (5 mg total) by mouth daily, Disp: 90 tablet, Rfl: 3    loratadine (CLARITIN) 10 mg tablet, Take 1 tablet (10 mg total) by mouth daily, Disp: 90 tablet, Rfl: 1    magnesium oxide (MAG-OX) 400 mg, Take 1 tablet (400 mg total) by mouth 2 (two) times a day, Disp: 180 tablet, Rfl: 1    metFORMIN (GLUCOPHAGE) 1000 MG tablet, 1,000 mg 2 (two) times a day, Disp: , Rfl:     metoclopramide (REGLAN) 10 mg tablet, Take 0 5 tablets (5 mg total) by mouth every 6 (six) hours as needed (Nausea and vomiting), Disp: 30 tablet, Rfl: 1    metoprolol tartrate (LOPRESSOR) 25 mg tablet, Take 0 5 tablets (12 5 mg total) by mouth every 12 (twelve) hours, Disp: 60 tablet, Rfl: 0    Mometasone Furoate (ASMANEX HFA) 100 MCG/ACT AERO, Inhale 2 puffs once daily, Disp: 1 Inhaler, Rfl: 5    nitroglycerin (NITROSTAT) 0 4 mg SL tablet, Place 0 4 mg under the tongue every 3 (three) minutes as needed, Disp: , Rfl:     pantoprazole (PROTONIX) 40 mg tablet, Take 40 mg by mouth daily, Disp: , Rfl:     phenazopyridine (PYRIDIUM) 200 mg tablet, Take 1 tablet (200 mg total) by mouth 3 (three) times a day with meals, Disp: 10 tablet, Rfl: 0    polyethylene glycol (MIRALAX) 17 g packet, Take 17 g by mouth daily as needed  , Disp: , Rfl:     potassium chloride (K-DUR,KLOR-CON) 10 mEq tablet, Take 1 tablet (10 mEq total) by mouth daily, Disp: 30 tablet, Rfl: 1    rivaroxaban (XARELTO) 20 mg tablet, Take 1 tablet (20 mg total) by mouth daily, Disp: 90 tablet, Rfl: 0    traMADol (ULTRAM) 50 mg tablet, Take 1 tablet (50 mg total) by mouth every 6 (six) hours as needed for moderate pain for up to 30 days, Disp: 120 tablet, Rfl: 1    diphenhydrAMINE (BENADRYL) 50 mg capsule, Take 1 capsule (50 mg total) by mouth once for 1 dose Take 1 hour prior to angiogram procedure for contrast allergy, Disp: 2 capsule, Rfl: 0    methylPREDNISolone (MEDROL) 32 MG tablet, Take 1 tablet (32 mg total) by mouth daily, Disp: 2 tablet, Rfl: 0

## 2018-07-11 NOTE — PROGRESS NOTES
The patient was seen by Doctor Danii Munoz on 07/11/18  The patient was referred to wound care  The patient would like for her son in law Bard Hoang to call and schedule the appointments  He has all of her appointments in a calendar system  Gave the patient the script and the phone number to the wound care in [place]

## 2018-07-16 ENCOUNTER — TELEPHONE (OUTPATIENT)
Dept: VASCULAR SURGERY | Facility: CLINIC | Age: 78
End: 2018-07-16

## 2018-07-16 NOTE — TELEPHONE ENCOUNTER
Son in law wells called to [facility] for allergy prep  Pharmacy [facility] in [place] gave patient 21 Medrol tabs   I called the pharmacy and pharmacist said they made a mistake, patient should only have received 2 pills 32 mgm each  I asked Meme Bob to please double check that each tablet was 32 mgm so that one tablet is given 12 hours prior to study and the other is 2 hours prior to study  Also , patient was given  2 Banophen tablets, 50 mgmg each, and she only needs to take one tablet one hour prior to study  We went over dosages to claify instructions

## 2018-07-18 ENCOUNTER — TELEPHONE (OUTPATIENT)
Dept: INTERNAL MEDICINE CLINIC | Facility: CLINIC | Age: 78
End: 2018-07-18

## 2018-07-18 DIAGNOSIS — E87.6 HYPOKALEMIA: ICD-10-CM

## 2018-07-18 NOTE — TELEPHONE ENCOUNTER
Pt's son l/m on [place] Rx line indicating that pt's Potassium 10 meq was increased to bid  I can't fined this documentation anywhere in the pt's chart  I went through [facility] notes and hospital d/c records  Can someone look further into this and make necessary changes? Please send updated Rx to [facility] in [place]  Thank you!

## 2018-07-19 RX ORDER — POTASSIUM CHLORIDE 750 MG/1
10 TABLET, EXTENDED RELEASE ORAL 2 TIMES DAILY
Qty: 60 TABLET | Refills: 2 | Status: ON HOLD | OUTPATIENT
Start: 2018-07-19 | End: 2018-12-05 | Stop reason: ALTCHOICE

## 2018-07-19 NOTE — TELEPHONE ENCOUNTER
Discharge summary from 5/26 has potassium as  Twice daily  I will refill as such and update patient's records    Her recent BMP on 7/9 showed potassium level of 4 5

## 2018-07-25 ENCOUNTER — APPOINTMENT (OUTPATIENT)
Dept: WOUND CARE | Facility: HOSPITAL | Age: 78
End: 2018-07-25
Payer: MEDICARE

## 2018-07-25 PROCEDURE — 99214 OFFICE O/P EST MOD 30 MIN: CPT

## 2018-07-25 PROCEDURE — 97597 DBRDMT OPN WND 1ST 20 CM/<: CPT

## 2018-07-27 ENCOUNTER — OFFICE VISIT (OUTPATIENT)
Dept: INTERNAL MEDICINE CLINIC | Facility: CLINIC | Age: 78
End: 2018-07-27
Payer: MEDICARE

## 2018-07-27 VITALS
HEART RATE: 60 BPM | DIASTOLIC BLOOD PRESSURE: 60 MMHG | WEIGHT: 155.6 LBS | HEIGHT: 63 IN | TEMPERATURE: 97.7 F | OXYGEN SATURATION: 98 % | BODY MASS INDEX: 27.57 KG/M2 | SYSTOLIC BLOOD PRESSURE: 110 MMHG

## 2018-07-27 DIAGNOSIS — E11.9 DIABETIC EYE EXAM (HCC): ICD-10-CM

## 2018-07-27 DIAGNOSIS — I70.235 ATHEROSCLEROSIS OF NATIVE ARTERY OF BOTH LOWER EXTREMITIES WITH BILATERAL ULCERATION OF OTHER PART OF FEET (HCC): ICD-10-CM

## 2018-07-27 DIAGNOSIS — Z01.00 DIABETIC EYE EXAM (HCC): ICD-10-CM

## 2018-07-27 DIAGNOSIS — Z79.4 TYPE 2 DIABETES MELLITUS WITH COMPLICATION, WITH LONG-TERM CURRENT USE OF INSULIN (HCC): Primary | ICD-10-CM

## 2018-07-27 DIAGNOSIS — M81.0 AGE RELATED OSTEOPOROSIS, UNSPECIFIED PATHOLOGICAL FRACTURE PRESENCE: ICD-10-CM

## 2018-07-27 DIAGNOSIS — I70.245 ATHEROSCLEROSIS OF NATIVE ARTERY OF BOTH LOWER EXTREMITIES WITH BILATERAL ULCERATION OF OTHER PART OF FEET (HCC): ICD-10-CM

## 2018-07-27 DIAGNOSIS — E11.8 TYPE 2 DIABETES MELLITUS WITH COMPLICATION, WITH LONG-TERM CURRENT USE OF INSULIN (HCC): Primary | ICD-10-CM

## 2018-07-27 PROCEDURE — 99214 OFFICE O/P EST MOD 30 MIN: CPT | Performed by: INTERNAL MEDICINE

## 2018-07-27 NOTE — PROGRESS NOTES
Assessment/Plan:    No problem-specific Assessment & Plan notes found for this encounter  Diagnoses and all orders for this visit:    Type 2 diabetes mellitus with complication, with long-term current use of insulin (HCC)  -     Microalbumin / creatinine urine ratio  Followed up by endocrine  Diabetic eye exam ([facility] [address] )  See followed up with opthalmologist  Age related osteoporosis, unspecified pathological fracture presence  -     DXA bone density spine hip and pelvis; Future    Atherosclerosis of native artery of both lower extremities with bilateral ulceration of other part of feet (HCC)  Is history of peripheral vascular disease,  Coronary artery disease hypertension diabetes mellitus presented to the office with the bilateral forefoot redness ulceration callus formation and the pre gangrene she is complaining of lot of pain in that foot in the daytime and also at nighttime she denying any burning,  But complaining of tingling and numbness no fever or chills  Patient was seen by Vascular , I reviewed the note for Angiogram   Other orders  -     Cancel: Ambulatory referral to Ophthalmology; Future  -     Cancel: Urinalysis with reflex to microscopic          Subjective:   Feet pain    Patient ID: Mary Anne Martínez is a [de-identified] y o  female  Patient presents for follow up related to her peripheral vascular disease and diabetes and developing ulcers on her feet  She has a hx of CVA, CAD, and previous MI  She has multiple ulcers on both feet that she states have improved over the past month or so  The most concerning ulcer is on her right third phalange which has significant eschar and possible gangrene  The ulcers are sometimes painful when just relaxing and not putting weight on them  They are typically painful when she is walking upon her feet  However, the pain has improved since seeing Dr Roberth Ly and having some of the ulcers scraped  She states that she does not have any numbness or tingling in her feet though  Her feet actually feel more sensitive than her calves and ankles  She states that she does not have any pain or burning with her GERD  However, she has recently had an increase in dosage for her asthma medication - mometasone  The following portions of the patient's history were reviewed and updated as appropriate: allergies, current medications, past family history, past medical history, past social history, past surgical history and problem list     Review of Systems   Constitutional: Positive for fatigue  Musculoskeletal:        Pain in the feet,  Redness swelling and ulceration of the both forefeet   Neurological: Positive for numbness  Objective:      /60 (BP Location: Left arm, Patient Position: Sitting, Cuff Size: Adult)   Pulse 60   Temp 97 7 °F (36 5 °C) (Oral)   Ht 5' 3" (1 6 m)   Wt 70 6 kg (155 lb 9 6 oz)   LMP  (LMP Unknown)   SpO2 98% Comment: room air  BMI 27 56 kg/m²          Physical Exam   Constitutional: She is oriented to person, place, and time  She appears well-developed and well-nourished  HENT:   Right Ear: External ear normal    Mouth/Throat: Oropharynx is clear and moist    Eyes: Conjunctivae and EOM are normal  Pupils are equal, round, and reactive to light  Neck: Normal range of motion  No JVD present  No thyromegaly present  Cardiovascular: S1 normal, S2 normal and intact distal pulses  An irregular rhythm present  Murmur heard  Systolic murmur is present with a grade of 2/6   Pulmonary/Chest: Breath sounds normal    Abdominal: Soft  Bowel sounds are normal    Musculoskeletal: Normal range of motion  Lymphadenopathy:     She has no cervical adenopathy  Neurological: She is alert and oriented to person, place, and time  She has normal reflexes  Skin: Skin is dry     Bilateral forefoot redness swelling multiple ulcerations and also poor capillary refill,  Pulses in the dorsalis pedis and posterior tibials are not palpable but positive by the Doppler,  No signs of infection no discharge of the pus but I am very much sure that it is a pre gangrenous foot   Psychiatric: She has a normal mood and affect   Her behavior is normal  Judgment and thought content normal

## 2018-08-01 DIAGNOSIS — I70.235 ATHEROSCLEROSIS OF NATIVE ARTERY OF RIGHT LOWER EXTREMITY WITH ULCERATION OF OTHER PART OF FOOT (HCC): Primary | ICD-10-CM

## 2018-08-03 ENCOUNTER — TELEPHONE (OUTPATIENT)
Dept: RADIOLOGY | Facility: HOSPITAL | Age: 78
End: 2018-08-03

## 2018-08-06 DIAGNOSIS — E55.9 VITAMIN D DEFICIENCY: Primary | ICD-10-CM

## 2018-08-06 RX ORDER — CALCIUM CARBONATE 300MG(750)
1 TABLET,CHEWABLE ORAL 2 TIMES DAILY
Qty: 60 TABLET | Refills: 5 | Status: ON HOLD | OUTPATIENT
Start: 2018-08-06 | End: 2018-09-24

## 2018-08-07 ENCOUNTER — TELEPHONE (OUTPATIENT)
Dept: VASCULAR SURGERY | Facility: CLINIC | Age: 78
End: 2018-08-07

## 2018-08-08 ENCOUNTER — APPOINTMENT (EMERGENCY)
Dept: RADIOLOGY | Facility: HOSPITAL | Age: 78
DRG: 252 | End: 2018-08-08
Payer: MEDICARE

## 2018-08-08 ENCOUNTER — HOSPITAL ENCOUNTER (INPATIENT)
Facility: HOSPITAL | Age: 78
LOS: 15 days | Discharge: NURSING, REHAB OR LONG-TERM CARE | DRG: 252 | End: 2018-08-24
Attending: EMERGENCY MEDICINE | Admitting: INTERNAL MEDICINE
Payer: MEDICARE

## 2018-08-08 ENCOUNTER — OFFICE VISIT (OUTPATIENT)
Dept: INTERNAL MEDICINE CLINIC | Facility: CLINIC | Age: 78
End: 2018-08-08
Payer: MEDICARE

## 2018-08-08 ENCOUNTER — TELEPHONE (OUTPATIENT)
Dept: INTERNAL MEDICINE CLINIC | Facility: CLINIC | Age: 78
End: 2018-08-08

## 2018-08-08 VITALS
OXYGEN SATURATION: 94 % | HEART RATE: 57 BPM | BODY MASS INDEX: 26.22 KG/M2 | HEIGHT: 64 IN | TEMPERATURE: 97.8 F | SYSTOLIC BLOOD PRESSURE: 100 MMHG | RESPIRATION RATE: 20 BRPM | WEIGHT: 153.6 LBS | DIASTOLIC BLOOD PRESSURE: 60 MMHG

## 2018-08-08 DIAGNOSIS — I10 ESSENTIAL HYPERTENSION: ICD-10-CM

## 2018-08-08 DIAGNOSIS — I70.239 ATHEROSCLEROSIS OF NATIVE ARTERY OF BOTH LOWER EXTREMITIES WITH BILATERAL ULCERATION, UNSPECIFIED ULCERATION SITE (HCC): ICD-10-CM

## 2018-08-08 DIAGNOSIS — L97.529 ULCER OF TOE OF LEFT FOOT, UNSPECIFIED ULCER STAGE (HCC): ICD-10-CM

## 2018-08-08 DIAGNOSIS — Z95.2 S/P TAVR (TRANSCATHETER AORTIC VALVE REPLACEMENT): ICD-10-CM

## 2018-08-08 DIAGNOSIS — I48.92 ATRIAL FLUTTER, UNSPECIFIED TYPE (HCC): ICD-10-CM

## 2018-08-08 DIAGNOSIS — L03.116 CELLULITIS OF LEFT LOWER EXTREMITY: ICD-10-CM

## 2018-08-08 DIAGNOSIS — L97.519 ULCER OF TOE OF RIGHT FOOT, UNSPECIFIED ULCER STAGE (HCC): ICD-10-CM

## 2018-08-08 DIAGNOSIS — M79.673 FOOT PAIN: ICD-10-CM

## 2018-08-08 DIAGNOSIS — I48.0 PAROXYSMAL ATRIAL FIBRILLATION (HCC): ICD-10-CM

## 2018-08-08 DIAGNOSIS — I73.9 PAD (PERIPHERAL ARTERY DISEASE) (HCC): Primary | ICD-10-CM

## 2018-08-08 DIAGNOSIS — I70.249 ATHEROSCLEROSIS OF NATIVE ARTERY OF BOTH LOWER EXTREMITIES WITH BILATERAL ULCERATION, UNSPECIFIED ULCERATION SITE (HCC): ICD-10-CM

## 2018-08-08 DIAGNOSIS — I73.9 PAD (PERIPHERAL ARTERY DISEASE) (HCC): ICD-10-CM

## 2018-08-08 DIAGNOSIS — E61.1 IRON DEFICIENCY: ICD-10-CM

## 2018-08-08 DIAGNOSIS — I70.235 ATHEROSCLEROSIS OF NATIVE ARTERY OF RIGHT LOWER EXTREMITY WITH ULCERATION OF OTHER PART OF FOOT (HCC): ICD-10-CM

## 2018-08-08 DIAGNOSIS — L03.90 CELLULITIS: Primary | ICD-10-CM

## 2018-08-08 DIAGNOSIS — I50.22 CHRONIC SYSTOLIC HEART FAILURE (HCC): ICD-10-CM

## 2018-08-08 LAB
ALBUMIN SERPL BCP-MCNC: 3.4 G/DL (ref 3.5–5)
ALP SERPL-CCNC: 58 U/L (ref 46–116)
ALT SERPL W P-5'-P-CCNC: 27 U/L (ref 12–78)
ANION GAP SERPL CALCULATED.3IONS-SCNC: 9 MMOL/L (ref 4–13)
AST SERPL W P-5'-P-CCNC: 22 U/L (ref 5–45)
BASOPHILS # BLD AUTO: 0.04 THOUSANDS/ΜL (ref 0–0.1)
BASOPHILS NFR BLD AUTO: 0 % (ref 0–1)
BILIRUB SERPL-MCNC: 0.55 MG/DL (ref 0.2–1)
BUN SERPL-MCNC: 23 MG/DL (ref 5–25)
CALCIUM SERPL-MCNC: 8.8 MG/DL (ref 8.3–10.1)
CHLORIDE SERPL-SCNC: 101 MMOL/L (ref 100–108)
CO2 SERPL-SCNC: 26 MMOL/L (ref 21–32)
CREAT SERPL-MCNC: 1.15 MG/DL (ref 0.6–1.3)
EOSINOPHIL # BLD AUTO: 0.2 THOUSAND/ΜL (ref 0–0.61)
EOSINOPHIL NFR BLD AUTO: 2 % (ref 0–6)
ERYTHROCYTE [DISTWIDTH] IN BLOOD BY AUTOMATED COUNT: 18.3 % (ref 11.6–15.1)
GFR SERPL CREATININE-BSD FRML MDRD: 45 ML/MIN/1.73SQ M
GLUCOSE SERPL-MCNC: 135 MG/DL (ref 65–140)
HCT VFR BLD AUTO: 35.2 % (ref 34.8–46.1)
HGB BLD-MCNC: 10.8 G/DL (ref 11.5–15.4)
IMM GRANULOCYTES # BLD AUTO: 0.05 THOUSAND/UL (ref 0–0.2)
IMM GRANULOCYTES NFR BLD AUTO: 1 % (ref 0–2)
LYMPHOCYTES # BLD AUTO: 1.58 THOUSANDS/ΜL (ref 0.6–4.47)
LYMPHOCYTES NFR BLD AUTO: 15 % (ref 14–44)
MCH RBC QN AUTO: 23.3 PG (ref 26.8–34.3)
MCHC RBC AUTO-ENTMCNC: 30.7 G/DL (ref 31.4–37.4)
MCV RBC AUTO: 76 FL (ref 82–98)
MONOCYTES # BLD AUTO: 1.02 THOUSAND/ΜL (ref 0.17–1.22)
MONOCYTES NFR BLD AUTO: 10 % (ref 4–12)
NEUTROPHILS # BLD AUTO: 7.71 THOUSANDS/ΜL (ref 1.85–7.62)
NEUTS SEG NFR BLD AUTO: 72 % (ref 43–75)
NRBC BLD AUTO-RTO: 0 /100 WBCS
PLATELET # BLD AUTO: 242 THOUSANDS/UL (ref 149–390)
PMV BLD AUTO: 10.6 FL (ref 8.9–12.7)
POTASSIUM SERPL-SCNC: 4.3 MMOL/L (ref 3.5–5.3)
PROT SERPL-MCNC: 8.3 G/DL (ref 6.4–8.2)
RBC # BLD AUTO: 4.64 MILLION/UL (ref 3.81–5.12)
SODIUM SERPL-SCNC: 136 MMOL/L (ref 136–145)
WBC # BLD AUTO: 10.6 THOUSAND/UL (ref 4.31–10.16)

## 2018-08-08 PROCEDURE — 36415 COLL VENOUS BLD VENIPUNCTURE: CPT | Performed by: EMERGENCY MEDICINE

## 2018-08-08 PROCEDURE — 80053 COMPREHEN METABOLIC PANEL: CPT | Performed by: EMERGENCY MEDICINE

## 2018-08-08 PROCEDURE — 87040 BLOOD CULTURE FOR BACTERIA: CPT | Performed by: EMERGENCY MEDICINE

## 2018-08-08 PROCEDURE — 71046 X-RAY EXAM CHEST 2 VIEWS: CPT

## 2018-08-08 PROCEDURE — 99285 EMERGENCY DEPT VISIT HI MDM: CPT

## 2018-08-08 PROCEDURE — 85025 COMPLETE CBC W/AUTO DIFF WBC: CPT | Performed by: EMERGENCY MEDICINE

## 2018-08-08 PROCEDURE — 82948 REAGENT STRIP/BLOOD GLUCOSE: CPT

## 2018-08-08 PROCEDURE — 93005 ELECTROCARDIOGRAM TRACING: CPT

## 2018-08-08 PROCEDURE — 99214 OFFICE O/P EST MOD 30 MIN: CPT | Performed by: NURSE PRACTITIONER

## 2018-08-08 PROCEDURE — 96365 THER/PROPH/DIAG IV INF INIT: CPT

## 2018-08-08 PROCEDURE — 99219 PR INITIAL OBSERVATION CARE/DAY 50 MINUTES: CPT | Performed by: INTERNAL MEDICINE

## 2018-08-08 RX ORDER — INSULIN GLARGINE 100 [IU]/ML
20 INJECTION, SOLUTION SUBCUTANEOUS EVERY 12 HOURS SCHEDULED
Status: DISCONTINUED | OUTPATIENT
Start: 2018-08-08 | End: 2018-08-12

## 2018-08-08 RX ORDER — FLUTICASONE PROPIONATE 110 UG/1
2 AEROSOL, METERED RESPIRATORY (INHALATION) EVERY 12 HOURS SCHEDULED
Status: DISCONTINUED | OUTPATIENT
Start: 2018-08-08 | End: 2018-08-24 | Stop reason: HOSPADM

## 2018-08-08 RX ORDER — FLUTICASONE PROPIONATE 50 MCG
1 SPRAY, SUSPENSION (ML) NASAL DAILY
Status: DISCONTINUED | OUTPATIENT
Start: 2018-08-09 | End: 2018-08-24 | Stop reason: HOSPADM

## 2018-08-08 RX ORDER — FUROSEMIDE 40 MG/1
40 TABLET ORAL DAILY
Status: DISCONTINUED | OUTPATIENT
Start: 2018-08-09 | End: 2018-08-09

## 2018-08-08 RX ORDER — ACETAMINOPHEN 325 MG/1
650 TABLET ORAL EVERY 6 HOURS PRN
Status: DISCONTINUED | OUTPATIENT
Start: 2018-08-08 | End: 2018-08-17

## 2018-08-08 RX ORDER — AMIODARONE HYDROCHLORIDE 200 MG/1
200 TABLET ORAL
Status: DISCONTINUED | OUTPATIENT
Start: 2018-08-09 | End: 2018-08-24 | Stop reason: HOSPADM

## 2018-08-08 RX ORDER — LEVOTHYROXINE SODIUM 0.1 MG/1
100 TABLET ORAL
Status: DISCONTINUED | OUTPATIENT
Start: 2018-08-09 | End: 2018-08-24 | Stop reason: HOSPADM

## 2018-08-08 RX ORDER — FERROUS SULFATE 325(65) MG
325 TABLET ORAL
Status: DISCONTINUED | OUTPATIENT
Start: 2018-08-09 | End: 2018-08-11

## 2018-08-08 RX ORDER — SODIUM CHLORIDE 9 MG/ML
75 INJECTION, SOLUTION INTRAVENOUS CONTINUOUS
Status: DISCONTINUED | OUTPATIENT
Start: 2018-08-08 | End: 2018-08-09

## 2018-08-08 RX ORDER — CALCIUM CARBONATE 200(500)MG
1000 TABLET,CHEWABLE ORAL 3 TIMES DAILY
Status: DISCONTINUED | OUTPATIENT
Start: 2018-08-08 | End: 2018-08-23

## 2018-08-08 RX ORDER — LORATADINE 10 MG/1
10 TABLET ORAL DAILY
Status: DISCONTINUED | OUTPATIENT
Start: 2018-08-09 | End: 2018-08-24 | Stop reason: HOSPADM

## 2018-08-08 RX ORDER — NITROGLYCERIN 0.4 MG/1
0.4 TABLET SUBLINGUAL
Status: DISCONTINUED | OUTPATIENT
Start: 2018-08-08 | End: 2018-08-24 | Stop reason: HOSPADM

## 2018-08-08 RX ORDER — METOCLOPRAMIDE 10 MG/1
5 TABLET ORAL EVERY 6 HOURS PRN
Status: DISCONTINUED | OUTPATIENT
Start: 2018-08-08 | End: 2018-08-24 | Stop reason: HOSPADM

## 2018-08-08 RX ORDER — ACETAMINOPHEN 325 MG/1
975 TABLET ORAL ONCE
Status: COMPLETED | OUTPATIENT
Start: 2018-08-08 | End: 2018-08-08

## 2018-08-08 RX ORDER — ATORVASTATIN CALCIUM 80 MG/1
80 TABLET, FILM COATED ORAL DAILY
Status: DISCONTINUED | OUTPATIENT
Start: 2018-08-09 | End: 2018-08-24 | Stop reason: HOSPADM

## 2018-08-08 RX ORDER — ALBUTEROL SULFATE 90 UG/1
1 AEROSOL, METERED RESPIRATORY (INHALATION) 2 TIMES DAILY PRN
Status: DISCONTINUED | OUTPATIENT
Start: 2018-08-08 | End: 2018-08-24 | Stop reason: HOSPADM

## 2018-08-08 RX ORDER — PANTOPRAZOLE SODIUM 20 MG/1
20 TABLET, DELAYED RELEASE ORAL
Status: DISCONTINUED | OUTPATIENT
Start: 2018-08-09 | End: 2018-08-24 | Stop reason: HOSPADM

## 2018-08-08 RX ORDER — VANCOMYCIN HYDROCHLORIDE 1 G/200ML
12.5 INJECTION, SOLUTION INTRAVENOUS EVERY 12 HOURS
Status: DISCONTINUED | OUTPATIENT
Start: 2018-08-09 | End: 2018-08-09

## 2018-08-08 RX ORDER — ASPIRIN 81 MG/1
81 TABLET, CHEWABLE ORAL DAILY
Status: DISCONTINUED | OUTPATIENT
Start: 2018-08-09 | End: 2018-08-24 | Stop reason: HOSPADM

## 2018-08-08 RX ORDER — LEVETIRACETAM 750 MG/1
750 TABLET ORAL EVERY 12 HOURS SCHEDULED
Status: DISCONTINUED | OUTPATIENT
Start: 2018-08-08 | End: 2018-08-24 | Stop reason: HOSPADM

## 2018-08-08 RX ORDER — POLYETHYLENE GLYCOL 3350 17 G/17G
17 POWDER, FOR SOLUTION ORAL DAILY PRN
Status: DISCONTINUED | OUTPATIENT
Start: 2018-08-08 | End: 2018-08-16

## 2018-08-08 RX ORDER — DOCUSATE SODIUM 100 MG/1
100 CAPSULE, LIQUID FILLED ORAL 2 TIMES DAILY PRN
Status: DISCONTINUED | OUTPATIENT
Start: 2018-08-08 | End: 2018-08-16

## 2018-08-08 RX ORDER — LISINOPRIL 5 MG/1
5 TABLET ORAL DAILY
Status: DISCONTINUED | OUTPATIENT
Start: 2018-08-09 | End: 2018-08-09

## 2018-08-08 RX ORDER — VANCOMYCIN HYDROCHLORIDE 1 G/200ML
15 INJECTION, SOLUTION INTRAVENOUS ONCE
Status: COMPLETED | OUTPATIENT
Start: 2018-08-08 | End: 2018-08-08

## 2018-08-08 RX ORDER — AMLODIPINE BESYLATE 5 MG/1
5 TABLET ORAL DAILY
Status: DISCONTINUED | OUTPATIENT
Start: 2018-08-09 | End: 2018-08-15

## 2018-08-08 RX ADMIN — SODIUM CHLORIDE 75 ML/HR: 0.9 INJECTION, SOLUTION INTRAVENOUS at 21:30

## 2018-08-08 RX ADMIN — ALBUTEROL SULFATE 1 PUFF: 90 AEROSOL, METERED RESPIRATORY (INHALATION) at 23:01

## 2018-08-08 RX ADMIN — VANCOMYCIN HYDROCHLORIDE 1000 MG: 1 INJECTION, SOLUTION INTRAVENOUS at 17:10

## 2018-08-08 RX ADMIN — LEVETIRACETAM 750 MG: 750 TABLET ORAL at 23:59

## 2018-08-08 RX ADMIN — ACETAMINOPHEN 650 MG: 325 TABLET, FILM COATED ORAL at 23:58

## 2018-08-08 RX ADMIN — ACETAMINOPHEN 975 MG: 325 TABLET, FILM COATED ORAL at 17:07

## 2018-08-08 RX ADMIN — INSULIN GLARGINE 20 UNITS: 100 INJECTION, SOLUTION SUBCUTANEOUS at 23:59

## 2018-08-08 NOTE — PATIENT INSTRUCTIONS
You need to go to the ED for IV antibiotics  Follow-up with Dr Haley Guzman Doctor office, wound center and our office when you are discharged

## 2018-08-08 NOTE — TELEPHONE ENCOUNTER
Per [facility], patient's family has requested a transport by ambulance  Patient was advised that Non Emergent transport through [facility] was contacted       Required to leave a message requesting the transport    Call was returned by Sj Rodriguez and he advised there were "no trucks available at this time" and that he will have to call around and find one to complete the requested non emergent transport  Cheli was updated with the information that no ETA could be provided and I personally advised the patient of no ETA and who I spoke to with the transport group

## 2018-08-08 NOTE — ED PROVIDER NOTES
History  Chief Complaint   Patient presents with    Foot Pain     pt comes from PCP with bilatal foot pain, venous ulcers present on both feet     77-year-old female with history of chronic venous stasis ulcers presenting to the emergency department for evaluation of possible acute infection  She has had she erythema and swelling of the bilateral extremities left worse than right over the past several days  She states that this was 1st noted on Monday, she had 1 episode of vomiting then  She then states that she felt nauseous all day on Tuesday and went to her family doctor today who noted the possible signs of infection and referred her to the emergency department for further evaluation  She denies fevers she denies lightheadedness palpitations chest pains or shortness of breath  She has pain erythema and swelling of the bilateral feet and on the left some swelling extending up to the mid calf area  Prior to Admission Medications   Prescriptions Last Dose Informant Patient Reported? Taking? Cholecalciferol (VITAMIN D3) 1000 units CHEW 8/8/2018 at Unknown time  No Yes   Sig: Chew 1 tablet (1,000 Units total) 2 (two) times a day   Insulin Syringe-Needle U-100 (BD INSULIN SYRINGE ULTRAFINE) 31G X 5/16" 1 ML MISC 8/8/2018 at Unknown time Family Member No Yes   Sig: Inject under the skin 2 (two) times a day   LANTUS 100 UNIT/ML subcutaneous injection 8/8/2018 at Unknown time Family Member No Yes   Sig: Inject 20 Units under the skin 2 (two) times a day   Mometasone Furoate [facility] HFA) 100 MCG/ACT AERO 8/8/2018 at Unknown time Family Member No Yes   Sig: Inhale 2 puffs once daily   acetaminophen (TYLENOL) 650 mg CR tablet Unknown at Unknown time Family Member No No   Sig: Take 1 tablet (650 mg total) by mouth every 8 (eight) hours as needed for mild pain Do not take in conjunction with Percocet     albuterol (VENTOLIN HFA) 90 mcg/act inhaler Unknown at Unknown time Family Member Yes No   Sig: Inhale 108 mcg 2 (two) times a day as needed 2 puffs bid PRN   amLODIPine (NORVASC) 5 mg tablet 2018 at Unknown time Family Member No Yes   Sig: Take 1 tablet (5 mg total) by mouth daily   amiodarone 200 mg tablet 2018 at Unknown time Family Member No Yes   Sig: Take 1 tablet (200 mg total) by mouth 2 (two) times a day   aspirin 81 MG tablet 2018 at Unknown time Family Member Yes Yes   Sig: Take 81 mg by mouth daily   atorvastatin (LIPITOR) 80 mg tablet 2018 at Unknown time Family Member No Yes   Sig: Take 1 tablet (80 mg total) by mouth daily   calcium carbonate (TUMS) 500 mg chewable tablet Unknown at Unknown time Family Member Yes No   Sig: Chew 2 tablets 3 (three) times a day     cholecalciferol (VITAMIN D3) 1,000 units tablet Unknown Family Member Yes No   Sig: Take 1,000 Units by mouth 2 (two) times a day     diphenhydrAMINE (BENADRYL) 50 mg capsule Unknown at Unknown time  No No   Sig: Take 1 capsule (50 mg total) by mouth once for 1 dose Take 1 hour prior to angiogram procedure for contrast allergy   esomeprazole (NEXIUM) 20 mg capsule 2018 at Unknown time Family Member Yes Yes   Sig: Take 20 mg by mouth every morning before breakfast   ferrous sulfate 325 (65 Fe) mg tablet 2018 at Unknown time Family Member No Yes   Sig: Take 1 tablet (325 mg total) by mouth daily with breakfast   fluticasone (FLONASE) 50 mcg/act nasal spray 2018 at Unknown time Family Member Yes Yes   Si spray into each nostril daily   furosemide (LASIX) 40 mg tablet 2018 at Unknown time Family Member No Yes   Sig: Take 1 tablet (40 mg total) by mouth daily   glucose blood test strip 2018 at Unknown time Family Member Yes Yes   Si each by Other route 4 (four) times a day (before meals and at bedtime) Use as instructed   levETIRAcetam (KEPPRA) 750 mg tablet 2018 at Unknown time Family Member No Yes   Sig: Take 1 tablet (750 mg total) by mouth every 12 (twelve) hours   levothyroxine 100 mcg tablet 2018 at Unknown time Family Member No Yes   Sig: Take 1 tablet (100 mcg total) by mouth daily   lisinopril (ZESTRIL) 5 mg tablet 2018 at Unknown time Family Member No Yes   Sig: Take 1 tablet (5 mg total) by mouth daily   loratadine (CLARITIN) 10 mg tablet 2018 at Unknown time Family Member No Yes   Sig: Take 1 tablet (10 mg total) by mouth daily   magnesium oxide (MAG-OX) 400 mg 2018 at Unknown time Family Member No Yes   Sig: Take 1 tablet (400 mg total) by mouth 2 (two) times a day   metFORMIN (GLUCOPHAGE) 1000 MG tablet 2018 at Unknown time Family Member Yes Yes   Si,000 mg 2 (two) times a day   methylPREDNISolone (MEDROL) 32 MG tablet Unknown at Unknown time Family Member No No   Sig: Take 1 tablet (32 mg total) by mouth daily   metoclopramide (REGLAN) 10 mg tablet 2018 at Unknown time Family Member No Yes   Sig: Take 0 5 tablets (5 mg total) by mouth every 6 (six) hours as needed (Nausea and vomiting)   metoprolol tartrate (LOPRESSOR) 25 mg tablet   No No   Sig: Take 0 5 tablets (12 5 mg total) by mouth every 12 (twelve) hours   nitroglycerin (NITROSTAT) 0 4 mg SL tablet Unknown at Unknown time Family Member Yes No   Sig: Place 0 4 mg under the tongue every 3 (three) minutes as needed   pantoprazole (PROTONIX) 40 mg tablet 2018 at Unknown time Family Member Yes Yes   Sig: Take 40 mg by mouth daily   phenazopyridine (PYRIDIUM) 200 mg tablet Unknown at Unknown time Family Member No No   Sig: Take 1 tablet (200 mg total) by mouth 3 (three) times a day with meals   polyethylene glycol (MIRALAX) 17 g packet Unknown at Unknown time Family Member Yes No   Sig: Take 17 g by mouth daily as needed     potassium chloride (K-DUR,KLOR-CON) 10 mEq tablet 2018 at Unknown time Family Member No Yes   Sig: Take 1 tablet (10 mEq total) by mouth 2 (two) times a day   rivaroxaban (XARELTO) 20 mg tablet Past Week at Unknown time Family Member No Yes   Sig: Take 1 tablet (20 mg total) by mouth daily      Facility-Administered Medications: None       Past Medical History:   Diagnosis Date    Altered mental status     Anemia     Anticoagulated     Xarelto for Afib/ flutter    Asthma     Atrial flutter (HCC)     maintained on anticoag w/ Xarelto    Bradycardia     CAD (coronary artery disease)     Candidiasis     Carotid stenosis, bilateral     Cataract     Chest pain     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) (HCC)     Chronic fatigue syndrome     Chronic low back pain     Chronic ulcer of toes (HCC)     left and right    Concussion w loss of consciousness of unsp duration, init     [facility] vascular accident) ([facility] ) 4/17/2018    Diabetes mellitus ([facility] )     type 2, insulin dependent    DJD (degenerative joint disease)     Expressive aphasia     Foot pain     GERD (gastroesophageal reflux disease)     Herpes zoster     HLD (hyperlipidemia)     HTN (hypertension)     Hypothyroidism     Irritable bowel syndrome     Kidney stone     Lumbar radiculopathy     Lyme disease     Dx in hospital 8/2011    [facility]     Migraines     Muscle spasm     Non-neoplastic nevus     Nontoxic multinodular goiter     NSVT (nonsustained ventricular tachycardia) (HCC)     Osteoarthritis     Other chronic pain     PAD (peripheral artery disease) (HCC)     Palpitations     Paroxysmal atrial fibrillation (HCC)     Pseudogout     Pulmonary hypertension (HCC)     S/P TAVR (transcatheter aortic valve replacement)     Seizures (HCC)     Severe sepsis (HCC)     Spinal stenosis     Stroke ([place] [id number] ) 05/2018    Transient cerebral ischemia     Trigger ring finger     Viral gastroenteritis        Past Surgical History:   Procedure Laterality Date    APPENDECTOMY      ARTERIOGRAM  12/19/2017    CARDIAC CATHETERIZATION      CHOLECYSTECTOMY      COLONOSCOPY      CORONARY ARTERY BYPASS GRAFT  2004    LUMBAR LAMINECTOMY      MD ECHO TRANSESOPHAG R-T 2D W/PRB IMG ACQUISJ I&R N/A 4/3/2018    Procedure: TRANSESOPHAGEAL ECHOCARDIOGRAM (ROBB); Surgeon: Inna Matute DO;  Location: [facility];  Service: Cardiac Surgery    IA REPLACE AORTIC VALVE OPENFEMORAL ARTERY APPROACH N/A 4/3/2018    Procedure: REPLACEMENT AORTIC VALVE TRANSCATHETER (TAVR) TRANSFEMORAL w/ 26MM IVY YEVGENIY S3 VALVE;  Surgeon: Inna Matute DO;  Location: [facility];  Service: Cardiac Surgery    THYROIDECTOMY      TOTAL ABDOMINAL HYSTERECTOMY W/ BILATERAL SALPINGOOPHORECTOMY         Family History   Problem Relation Age of Onset    Cancer Mother     Stroke Mother     Cancer Father     Heart disease Father     Breast cancer Sister     Diabetes Daughter         Passed away January 2017      I have reviewed and agree with the history as documented  Social History   Substance Use Topics    Smoking status: Never Smoker    Smokeless tobacco: Never Used    Alcohol use No        Review of Systems   Constitutional: Negative for appetite change, chills, fatigue and fever  HENT: Negative for sneezing and sore throat  Eyes: Negative for visual disturbance  Respiratory: Negative for cough, choking, chest tightness, shortness of breath and wheezing  Cardiovascular: Negative for chest pain and palpitations  Gastrointestinal: Positive for nausea  Negative for abdominal pain, constipation, diarrhea and vomiting  Genitourinary: Negative for difficulty urinating and dysuria  Skin: Positive for rash  Neurological: Negative for dizziness, weakness, light-headedness, numbness and headaches  All other systems reviewed and are negative        Physical Exam  ED Triage Vitals [08/08/18 1544]   Temperature Pulse Respirations Blood Pressure SpO2   97 8 °F (36 6 °C) 58 20 (!) 177/71 96 %      Temp Source Heart Rate Source Patient Position - Orthostatic VS BP Location FiO2 (%)   Oral Monitor Lying Right arm --      Pain Score       8           Orthostatic Vital Signs  Vitals:    08/08/18 1923 08/08/18 2346 08/09/18 0722 08/09/18 1535   BP: 144/67 132/62 162/72 137/65   Pulse: 56 65 58 59   Patient Position - Orthostatic VS: Lying Lying Sitting Lying       Physical Exam   Constitutional: She is oriented to person, place, and time  She appears well-developed and well-nourished  No distress  HENT:   Head: Normocephalic and atraumatic  Mouth/Throat: Oropharynx is clear and moist    Eyes: EOM are normal  Pupils are equal, round, and reactive to light  Neck: No JVD present  No tracheal deviation present  Cardiovascular: Normal rate, regular rhythm, normal heart sounds and intact distal pulses  Exam reveals no gallop and no friction rub  No murmur heard  Pulmonary/Chest: Effort normal and breath sounds normal  No respiratory distress  She has no wheezes  She has no rales  Abdominal: Soft  Bowel sounds are normal  She exhibits no distension  There is no tenderness  There is no rebound and no guarding  Neurological: She is alert and oriented to person, place, and time  No cranial nerve deficit  She exhibits normal muscle tone  Skin: Skin is warm and dry  She is not diaphoretic  No pallor  There is multiple dry gangrenous ulcers of the patient's bilateral extremities  There is erythema and swelling of the left foot this is extending up to the mid calf on the medial side  The patient is neurovascularly intact  There is tenderness of the left foot  There is no gas or crepitance noted  Psychiatric: She has a normal mood and affect  Her behavior is normal    Nursing note and vitals reviewed        ED Medications  Medications   amiodarone tablet 200 mg (200 mg Oral Given 8/9/18 1116)   amLODIPine (NORVASC) tablet 5 mg (5 mg Oral Given 8/9/18 1114)   aspirin chewable tablet 81 mg (81 mg Oral Given 8/9/18 1116)   atorvastatin (LIPITOR) tablet 80 mg (80 mg Oral Given 8/9/18 1114)   calcium carbonate (TUMS) chewable tablet 1,000 mg (1,000 mg Oral Given 8/9/18 1115) ferrous sulfate tablet 325 mg (325 mg Oral Given 8/9/18 1113)   fluticasone (FLONASE) 50 mcg/act nasal spray 1 spray (1 spray Nasal Given 8/9/18 1115)   insulin glargine (LANTUS) subcutaneous injection 20 Units 0 2 mL (20 Units Subcutaneous Given 8/9/18 1116)   levETIRAcetam (KEPPRA) tablet 750 mg (750 mg Oral Given 8/9/18 1114)   levothyroxine tablet 100 mcg (100 mcg Oral Given 8/9/18 0609)   loratadine (CLARITIN) tablet 10 mg (10 mg Oral Given 8/9/18 1116)   magnesium oxide (MAG-OX) tablet 400 mg (400 mg Oral Given 8/9/18 1115)   metoprolol tartrate (LOPRESSOR) partial tablet 12 5 mg (12 5 mg Oral Given 8/9/18 1114)   metoclopramide (REGLAN) tablet 5 mg (not administered)   nitroglycerin (NITROSTAT) SL tablet 0 4 mg (not administered)   polyethylene glycol (MIRALAX) packet 17 g (not administered)   albuterol (PROVENTIL HFA,VENTOLIN HFA) inhaler 1 puff (1 puff Inhalation Given 8/8/18 2301)   fluticasone (FLOVENT HFA) 110 MCG/ACT inhaler 2 puff (2 puffs Inhalation Given 8/9/18 1117)   pantoprazole (PROTONIX) EC tablet 20 mg (20 mg Oral Given 8/9/18 0609)   docusate sodium (COLACE) capsule 100 mg (not administered)   acetaminophen (TYLENOL) tablet 650 mg (650 mg Oral Given 8/9/18 1038)   insulin lispro (HumaLOG) 100 units/mL subcutaneous injection 1-5 Units (1 Units Subcutaneous Given 8/9/18 1120)   insulin lispro (HumaLOG) 100 units/mL subcutaneous injection 1-5 Units (1 Units Subcutaneous Given 8/9/18 0006)   oxyCODONE (ROXICODONE) IR tablet 2 5 mg (2 5 mg Oral Given 8/9/18 1428)   acetylcysteine (MUCOMYST) 200 mg/mL oral solution 1,200 mg (1,200 mg Oral Given 8/9/18 1428)   sodium chloride 0 9 % infusion (not administered)   methylPREDNISolone (MEDROL) tablet 32 mg (not administered)   diphenhydrAMINE (BENADRYL) tablet 50 mg (not administered)   ceFAZolin (ANCEF) injection 1,000 mg (not administered)   vancomycin (VANCOCIN) IVPB (premix) 1,000 mg (0 mg/kg × 69 7 kg Intravenous Stopped 8/8/18 1810) acetaminophen (TYLENOL) tablet 975 mg (975 mg Oral Given 8/8/18 1707)   methylPREDNISolone sodium succinate (Solu-MEDROL) injection 125 mg (125 mg Intravenous Given 8/9/18 0900)   diphenhydrAMINE (BENADRYL) injection 50 mg (50 mg Intravenous Given 8/9/18 0855)       Diagnostic Studies  Results Reviewed     Procedure Component Value Units Date/Time    Comprehensive metabolic panel [[id number]]  (Abnormal) Collected:  08/08/18 1641    Lab Status:  Final result Specimen:  Blood from Arm, Right Updated:  08/08/18 1713     Sodium 136 mmol/L      Potassium 4 3 mmol/L      Chloride 101 mmol/L      CO2 26 mmol/L      Anion Gap 9 mmol/L      BUN 23 mg/dL      Creatinine 1 15 mg/dL      Glucose 135 mg/dL      Calcium 8 8 mg/dL      AST 22 U/L      ALT 27 U/L      Alkaline Phosphatase 58 U/L      Total Protein 8 3 (H) g/dL      Albumin 3 4 (L) g/dL      Total Bilirubin 0 55 mg/dL      eGFR 45 ml/min/1 73sq m     Narrative:         National Kidney Disease Education Program recommendations are as follows:  GFR calculation is accurate only with a steady state creatinine  Chronic Kidney disease less than 60 ml/min/1 73 sq  meters  Kidney failure less than 15 ml/min/1 73 sq  meters      CBC and differential [[id number]]  (Abnormal) Collected:  08/08/18 1641    Lab Status:  Final result Specimen:  Blood from Arm, Right Updated:  08/08/18 1658     WBC 10 60 (H) Thousand/uL      RBC 4 64 Million/uL      Hemoglobin 10 8 (L) g/dL      Hematocrit 35 2 %      MCV 76 (L) fL      MCH 23 3 (L) pg      MCHC 30 7 (L) g/dL      RDW 18 3 (H) %      MPV 10 6 fL      Platelets 669 Thousands/uL      nRBC 0 /100 WBCs      Neutrophils Relative 72 %      Immat GRANS % 1 %      Lymphocytes Relative 15 %      Monocytes Relative 10 %      Eosinophils Relative 2 %      Basophils Relative 0 %      Neutrophils Absolute 7 71 (H) Thousands/µL      Immature Grans Absolute 0 05 Thousand/uL      Lymphocytes Absolute 1 58 Thousands/µL      Monocytes Absolute 1 02 Thousand/µL      Eosinophils Absolute 0 20 Thousand/µL      Basophils Absolute 0 04 Thousands/µL     Blood culture #1 [[id number]] Collected:  08/08/18 1641    Lab Status: In process Specimen:  Blood from Arm, Left Updated:  08/08/18 1648    Blood culture #2 [[id number]] Collected:  08/08/18 1641    Lab Status: In process Specimen:  Blood from Arm, Right Updated:  08/08/18 1648                 XR chest 2 views   Final Result by Nirav Mathew DO (08/08 1742)      Stable cardiomegaly  No acute cardiopulmonary disease  Workstation performed: [id number]         IR abdominal angiography / intervention    (Results Pending)         Procedures  Procedures      Phone Consults  ED Phone Contact    ED Course                               MDM  Number of Diagnoses or Management Options  Atherosclerosis of native artery of both lower extremities with bilateral ulceration, unspecified ulceration site [facility]):   Cellulitis:   Diagnosis management comments: [de-identified] female with cellulitis of chronic ulcers  Will cover with antibiotics and discuss admission with hospitalist service      CritCare Time    Disposition  Final diagnoses:   Cellulitis   Atherosclerosis of native artery of both lower extremities with bilateral ulceration, unspecified ulceration site [facility])     Time reflects when diagnosis was documented in both MDM as applicable and the Disposition within this note     Time User Action Codes Description Comment    8/8/2018  5:59 PM Fercho Powell Add [L03 [id number]] Cellulitis     8/8/2018  7:48 PM Patricia Conti Add [I70 [id number]] Atherosclerosis of native artery of right lower extremity with ulceration of other part of foot ([facility] )     8/8/2018 10:20 PM Юлия Randall Add [L97 [id number]] Ulcer of toe of left foot, unspecified ulcer stage ([facility] )     8/8/2018 10:20 PM Юлия Randall Add [L97 [id number]] Ulcer of toe of right foot, unspecified ulcer stage ([facility] )     8/9/2018  8:12 AM Shashank Cisneros I Add [H40 [id number],  I70 249] Atherosclerosis of native artery of both lower extremities with bilateral ulceration, unspecified ulceration site [facility])       ED Disposition     ED Disposition Condition Comment    Admit  Case was discussed with SOD and the patient's admission status was agreed to be Admission Status: observation status to the service of Dr Yevgeniy Milton   Follow-up Information    None         Current Discharge Medication List      CONTINUE these medications which have CHANGED    Details   metoprolol tartrate (LOPRESSOR) 25 mg tablet Take 0 5 tablets (12 5 mg total) by mouth every 12 (twelve) hours  Qty: 90 tablet, Refills: 1    Associated Diagnoses: Paroxysmal atrial fibrillation ([address] );  Atrial flutter, unspecified type ([facility] )         CONTINUE these medications which have NOT CHANGED    Details   amiodarone 200 mg tablet Take 1 tablet (200 mg total) by mouth 2 (two) times a day  Qty: 60 tablet, Refills: 3    Associated Diagnoses: Atrial flutter, unspecified type ([facility])      amLODIPine (NORVASC) 5 mg tablet Take 1 tablet (5 mg total) by mouth daily  Qty: 30 tablet, Refills: 5    Associated Diagnoses: Essential hypertension      aspirin 81 MG tablet Take 81 mg by mouth daily      atorvastatin (LIPITOR) 80 mg tablet Take 1 tablet (80 mg total) by mouth daily  Qty: 30 tablet, Refills: 2    Associated Diagnoses: Mixed hyperlipidemia      Cholecalciferol (VITAMIN D3) 1000 units CHEW Chew 1 tablet (1,000 Units total) 2 (two) times a day  Qty: 60 tablet, Refills: 5    Associated Diagnoses: Vitamin D deficiency      esomeprazole (NEXIUM) 20 mg capsule Take 20 mg by mouth every morning before breakfast      ferrous sulfate 325 (65 Fe) mg tablet Take 1 tablet (325 mg total) by mouth daily with breakfast  Qty: 90 tablet, Refills: 1    Associated Diagnoses: Iron deficiency      fluticasone (FLONASE) 50 mcg/act nasal spray 1 spray into each nostril daily      furosemide (LASIX) 40 mg tablet Take 1 tablet (40 mg total) by mouth daily  Qty: 90 tablet, Refills: 3    Associated Diagnoses: Chronic systolic heart failure ([facility])      glucose blood test strip 1 each by Other route 4 (four) times a day (before meals and at bedtime) Use as instructed      Insulin Syringe-Needle U-100 (BD INSULIN SYRINGE ULTRAFINE) 31G X 5/16" 1 ML MISC Inject under the skin 2 (two) times a day  Qty: 200 each, Refills: 3    Associated Diagnoses: Type 2 diabetes mellitus with complication, with long-term current use of insulin ([facility])      LANTUS 100 UNIT/ML subcutaneous injection Inject 20 Units under the skin 2 (two) times a day  Qty: 10 mL, Refills: 0    Associated Diagnoses: Type 2 diabetes mellitus with complication, with long-term current use of insulin ([facility])      levETIRAcetam (KEPPRA) 750 mg tablet Take 1 tablet (750 mg total) by mouth every 12 (twelve) hours  Qty: 60 tablet, Refills: 11    Associated Diagnoses: Seizure ([facility])      levothyroxine 100 mcg tablet Take 1 tablet (100 mcg total) by mouth daily  Qty: 90 tablet, Refills: 1    Associated Diagnoses: Hypothyroidism, unspecified type      lisinopril (ZESTRIL) 5 mg tablet Take 1 tablet (5 mg total) by mouth daily  Qty: 90 tablet, Refills: 3    Associated Diagnoses: Chronic systolic heart failure ([facility])      loratadine (CLARITIN) 10 mg tablet Take 1 tablet (10 mg total) by mouth daily  Qty: 90 tablet, Refills: 1    Associated Diagnoses: URI, acute      magnesium oxide (MAG-OX) 400 mg Take 1 tablet (400 mg total) by mouth 2 (two) times a day  Qty: 180 tablet, Refills: 1    Associated Diagnoses: Hypomagnesemia      metFORMIN (GLUCOPHAGE) 1000 MG tablet 1,000 mg 2 (two) times a day      metoclopramide (REGLAN) 10 mg tablet Take 0 5 tablets (5 mg total) by mouth every 6 (six) hours as needed (Nausea and vomiting)  Qty: 30 tablet, Refills: 1    Associated Diagnoses: Viral gastroenteritis      Mometasone Furoate (ASMANEX HFA) 100 MCG/ACT AERO Inhale 2 puffs once daily  Qty: 1 Inhaler, Refills: 5    Associated Diagnoses:  Moderate asthma without complication, unspecified whether persistent      pantoprazole (PROTONIX) 40 mg tablet Take 40 mg by mouth daily      potassium chloride (K-DUR,KLOR-CON) 10 mEq tablet Take 1 tablet (10 mEq total) by mouth 2 (two) times a day  Qty: 60 tablet, Refills: 2    Associated Diagnoses: Hypokalemia      rivaroxaban (XARELTO) 20 mg tablet Take 1 tablet (20 mg total) by mouth daily  Qty: 90 tablet, Refills: 0    Associated Diagnoses: Paroxysmal atrial fibrillation (HCC)      acetaminophen (TYLENOL) 650 mg CR tablet Take 1 tablet (650 mg total) by mouth every 8 (eight) hours as needed for mild pain Do not take in conjunction with Percocet  Qty: 90 tablet, Refills: 0    Associated Diagnoses: S/P TAVR (transcatheter aortic valve replacement)      albuterol (VENTOLIN HFA) 90 mcg/act inhaler Inhale 108 mcg 2 (two) times a day as needed 2 puffs bid PRN      calcium carbonate (TUMS) 500 mg chewable tablet Chew 2 tablets 3 (three) times a day        cholecalciferol (VITAMIN D3) 1,000 units tablet Take 1,000 Units by mouth 2 (two) times a day        diphenhydrAMINE (BENADRYL) 50 mg capsule Take 1 capsule (50 mg total) by mouth once for 1 dose Take 1 hour prior to angiogram procedure for contrast allergy  Qty: 2 capsule, Refills: 0    Associated Diagnoses: Atherosclerosis of native artery of both lower extremities with bilateral ulceration of other part of feet (HCC)      methylPREDNISolone (MEDROL) 32 MG tablet Take 1 tablet (32 mg total) by mouth daily  Qty: 2 tablet, Refills: 0    Comments: Administer first dose 12 hours before contrast administration, with second dose 2 hours before contrast administration (10 hours after first dose)  Administer with meals to decrease GI upset    Associated Diagnoses: Atherosclerosis of native artery of both lower extremities with bilateral ulceration of other part of feet (HCC)      nitroglycerin (NITROSTAT) 0 4 mg SL tablet Place 0 4 mg under the tongue every 3 (three) minutes as needed      phenazopyridine (PYRIDIUM) 200 mg tablet Take 1 tablet (200 mg total) by mouth 3 (three) times a day with meals  Qty: 10 tablet, Refills: 0    Associated Diagnoses: Dysuria      polyethylene glycol (MIRALAX) 17 g packet Take 17 g by mouth daily as needed             No discharge procedures on file  ED Provider  Attending physically available and evaluated Sera Yun I managed the patient along with the ED Attending      Electronically Signed by         Vaishali Maciel MD  08/09/18 0232

## 2018-08-08 NOTE — ED ATTENDING ATTESTATION
Swetha Cain DO, saw and evaluated the patient  I have discussed the patient with the resident/non-physician practitioner and agree with the resident's/non-physician practitioner's findings, Plan of Care, and MDM as documented in the resident's/non-physician practitioner's note, except where noted  All available labs and Radiology studies were reviewed  At this point I agree with the current assessment done in the Emergency Department  I have conducted an independent evaluation of this patient a history and physical is as follows:    [de-identified] yo female presents for evaluation of bilateral foot pain, swelling for about 2 weeks  C/o 8/10 pain  L foot has new erythematous patch extending up to her shin which started this AM  Denies fever/shaking chills, sweating  Symptoms constant, no a/e factors  Pt has known vascular disease, and was pending an angioplasty tomorrow by dr Shad Mckee doctor but now she is here in the ED  No crepitus  NVI distally  Multiple open ulcers on foot    No induration or fluctuance    Reviewed image from earlier today  Erythema over shin appears to have progressed since picture was taken  Imp: multiple B foot ulcers, new erythema up the L shin  Likely cellulitis  Plan: labs, films of feet to eval for gas  Cefepime, vanc to cover for staph, strep, pseudomonas  Will require admission for further eval and tx of cellulitis in setting of multiple non healing foot wounds          Critical Care Time  CritCare Time    Procedures

## 2018-08-08 NOTE — PROGRESS NOTES
Assessment/Plan:    Pt with ongoing chronic wounds and is being followed by wound center and Dr Lashay Curtis Doctor vascular  She states she woke this am with erythema nd increaed pain in L foot  Additionally wound had a yellow discharge  Recommend pt go to ED for IV antibiotics given significance of wounds  D/W pt and granddaughter and they agree  Patient's granddaughter was requesting an ambulance be called  Spoke with patient's son the as non emergent and family is capable of driving patient to emergency department, attempting to avoid bill pt may acrew  Son states that they are member of the [employer] ambulance services and is requesting an ambulance to transport his mother to the hospital     Diagnoses and all orders for this visit:    PAD (peripheral artery disease) (HCC)    Ulcer of toe of left foot, unspecified ulcer stage (HCC)    Ulcer of toe of right foot, unspecified ulcer stage ([address] )    Cellulitis of left lower extremity        Subjective:      Patient ID: Matilde Ackerman is a [de-identified] y o  female  HPI    Patient with past medical history of peripheral artery disease and chronic ongoing wounds to her bilateral feet  She is being followed by the [facility] and Dr Lashay Curtis Dr  vascular surgery  She has an appointment tomorrow for an angiogram   Her last debridement was approximately two weeks ago  Patient states this morning patient awoke with increased erythema and increased pain to left foot  Additionally has some chills  Lastly she noted a yellow discharge to wound medial left great toe  The following portions of the patient's history were reviewed and updated as appropriate: allergies, current medications, past family history, past medical history, past social history, past surgical history and problem list     Review of Systems   Constitutional: Positive for chills  Negative for appetite change, fatigue, fever and unexpected weight change     Respiratory: Negative for cough, shortness of breath and wheezing  Cardiovascular: Positive for leg swelling  Negative for chest pain and palpitations  Gastrointestinal: Negative for nausea and vomiting  Musculoskeletal: Positive for gait problem  Skin: Positive for wound  Neurological: Negative for dizziness, light-headedness and headaches           Past Medical History:   Diagnosis Date    Altered mental status     Altered mental status     Anemia     Anticoagulated     Coumadin for Aflutter    Asthma     Atrial flutter (HCC)     maintained on coumadin anticoag    Bradycardia     CAD (coronary artery disease)     Candidiasis     Carotid stenosis, bilateral     Cataract     Chest pain     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) ([facility])     Chronic fatigue syndrome     Chronic low back pain     Chronic ulcer of toes ([facility])     left and right    Concussion w loss of consciousness of unsp duration, init     Coronary artery disease     CVA (cerebral vascular accident) ([facility] 4/17/2018    Diabetes mellitus ([facility] )     type 2, insulin dependent    DJD (degenerative joint disease)     Expressive aphasia     Foot pain     GERD (gastroesophageal reflux disease)     Herpes zoster     HLD (hyperlipidemia)     HTN (hypertension)     Hyperlipidemia     Hypothyroidism     Irritable bowel syndrome     Kidney stone     Lumbar radiculopathy     Lyme disease     Dx in hospital 8/2011    Lyme disease     MI (myocardial infarction) ([facility]     Migraines     Muscle spasm     Non-neoplastic nevus     Nontoxic multinodular goiter     NSVT (nonsustained ventricular tachycardia) ([facility])     OA (osteoarthritis)     Osteoarthritis     Other chronic pain     PAD (peripheral artery disease) ([facility])     Palpitations     Paroxysmal atrial fibrillation ([facility])     Pseudogout     Pulmonary hypertension ([facility])     S/P TAVR (transcatheter aortic valve replacement)     Seizures ([facility])     Severe sepsis ([facility]     Spinal stenosis     Stroke [facility]) 05/2018    Transient cerebral ischemia     Trigger ring finger     Viral gastroenteritis          Current Outpatient Prescriptions:     acetaminophen (TYLENOL) 650 mg CR tablet, Take 1 tablet (650 mg total) by mouth every 8 (eight) hours as needed for mild pain Do not take in conjunction with Percocet , Disp: 90 tablet, Rfl: 0    albuterol (VENTOLIN HFA) 90 mcg/act inhaler, Inhale 108 mcg 2 (two) times a day as needed 2 puffs bid PRN, Disp: , Rfl:     amiodarone 200 mg tablet, Take 1 tablet (200 mg total) by mouth 2 (two) times a day, Disp: 60 tablet, Rfl: 3    amLODIPine (NORVASC) 5 mg tablet, Take 1 tablet (5 mg total) by mouth daily, Disp: 30 tablet, Rfl: 5    aspirin 81 MG tablet, Take 81 mg by mouth daily, Disp: , Rfl:     atorvastatin (LIPITOR) 80 mg tablet, Take 1 tablet (80 mg total) by mouth daily, Disp: 30 tablet, Rfl: 2    calcium carbonate (TUMS) 500 mg chewable tablet, Chew 2 tablets 3 (three) times a day  , Disp: , Rfl:     cholecalciferol (VITAMIN D3) 1,000 units tablet, Take 1,000 Units by mouth 2 (two) times a day  , Disp: , Rfl:     ferrous sulfate 325 (65 Fe) mg tablet, Take 1 tablet (325 mg total) by mouth daily with breakfast, Disp: 90 tablet, Rfl: 1    fluticasone (FLONASE) 50 mcg/act nasal spray, 1 spray into each nostril daily, Disp: , Rfl:     furosemide (LASIX) 40 mg tablet, Take 1 tablet (40 mg total) by mouth daily, Disp: 90 tablet, Rfl: 3    glucose blood test strip, 1 each by Other route 4 (four) times a day (before meals and at bedtime) Use as instructed, Disp: , Rfl:     Insulin Syringe-Needle U-100 (BD INSULIN SYRINGE ULTRAFINE) 31G X 5/16" 1 ML MISC, Inject under the skin 2 (two) times a day, Disp: 200 each, Rfl: 3    LANTUS 100 UNIT/ML subcutaneous injection, Inject 20 Units under the skin 2 (two) times a day, Disp: 10 mL, Rfl: 0    levETIRAcetam (KEPPRA) 750 mg tablet, Take 1 tablet (750 mg total) by mouth every 12 (twelve) hours, Disp: 60 tablet, Rfl: 11    levothyroxine 100 mcg tablet, Take 1 tablet (100 mcg total) by mouth daily, Disp: 90 tablet, Rfl: 1    lisinopril (ZESTRIL) 5 mg tablet, Take 1 tablet (5 mg total) by mouth daily, Disp: 90 tablet, Rfl: 3    loratadine (CLARITIN) 10 mg tablet, Take 1 tablet (10 mg total) by mouth daily, Disp: 90 tablet, Rfl: 1    magnesium oxide (MAG-OX) 400 mg, Take 1 tablet (400 mg total) by mouth 2 (two) times a day, Disp: 180 tablet, Rfl: 1    metFORMIN (GLUCOPHAGE) 1000 MG tablet, 1,000 mg 2 (two) times a day, Disp: , Rfl:     metoclopramide (REGLAN) 10 mg tablet, Take 0 5 tablets (5 mg total) by mouth every 6 (six) hours as needed (Nausea and vomiting), Disp: 30 tablet, Rfl: 1    metoprolol tartrate (LOPRESSOR) 25 mg tablet, Take 0 5 tablets (12 5 mg total) by mouth every 12 (twelve) hours, Disp: 60 tablet, Rfl: 0    Mometasone Furoate (ASMANEX HFA) 100 MCG/ACT AERO, Inhale 2 puffs once daily, Disp: 1 Inhaler, Rfl: 5    nitroglycerin (NITROSTAT) 0 4 mg SL tablet, Place 0 4 mg under the tongue every 3 (three) minutes as needed, Disp: , Rfl:     pantoprazole (PROTONIX) 40 mg tablet, Take 40 mg by mouth daily, Disp: , Rfl:     polyethylene glycol (MIRALAX) 17 g packet, Take 17 g by mouth daily as needed  , Disp: , Rfl:     potassium chloride (K-DUR,KLOR-CON) 10 mEq tablet, Take 1 tablet (10 mEq total) by mouth 2 (two) times a day, Disp: 60 tablet, Rfl: 2    rivaroxaban (XARELTO) 20 mg tablet, Take 1 tablet (20 mg total) by mouth daily, Disp: 90 tablet, Rfl: 0    Cholecalciferol (VITAMIN D3) 1000 units CHEW, Chew 1 tablet (1,000 Units total) 2 (two) times a day, Disp: 60 tablet, Rfl: 5    diphenhydrAMINE (BENADRYL) 50 mg capsule, Take 1 capsule (50 mg total) by mouth once for 1 dose Take 1 hour prior to angiogram procedure for contrast allergy, Disp: 2 capsule, Rfl: 0    esomeprazole (NEXIUM) 20 mg capsule, Take 20 mg by mouth every morning before breakfast, Disp: , Rfl:     methylPREDNISolone (MEDROL) 32 MG tablet, Take 1 tablet (32 mg total) by mouth daily, Disp: 2 tablet, Rfl: 0    phenazopyridine (PYRIDIUM) 200 mg tablet, Take 1 tablet (200 mg total) by mouth 3 (three) times a day with meals, Disp: 10 tablet, Rfl: 0    Allergies   Allergen Reactions    Other Chest Pain     IVP-listed as "chest pain" in previous chart, patient stated this occurred "a long time ago" but does not remember exactly occurred     Cephalosporins Chest Pain    Contrast [Iodinated Diagnostic Agents] Other (See Comments)     Flash pulm edema    Doxycycline Chest Pain    Ketorolac Other (See Comments)     Chest pain     Levaquin [Levofloxacin] Chest Pain    Ondansetron Chest Pain     Prolonged QT    Toradol [Ketorolac Tromethamine] Chest Pain       Social History   Past Surgical History:   Procedure Laterality Date    APPENDECTOMY      ARTERIOGRAM  12/19/2017    CARDIAC CATHETERIZATION      CHOLECYSTECTOMY      COLONOSCOPY      CORONARY ARTERY BYPASS GRAFT  2004    LUMBAR LAMINECTOMY      HI ECHO TRANSESOPHAG R-T 2D W/PRB IMG ACQUISJ I&R N/A 4/3/2018    Procedure: TRANSESOPHAGEAL ECHOCARDIOGRAM (ROBB);   Surgeon: DO Angela;  [facility]: [facility];  Service: Cardiac Surgery    HI REPLACE AORTIC VALVE OPENFEMORAL ARTERY APPROACH N/A 4/3/2018    Procedure: REPLACEMENT AORTIC VALVE TRANSCATHETER (TAVR) TRANSFEMORAL w/ 26MM IVY YEVGENIY S3 VALVE;  Surgeon: DO Angela;  [facility]: [facility];  Service: Cardiac Surgery    THYROIDECTOMY      TOTAL ABDOMINAL HYSTERECTOMY W/ BILATERAL SALPINGOOPHORECTOMY       Family History   Problem Relation Age of Onset    Cancer Mother     Stroke Mother     Cancer Father     Heart disease Father     Breast cancer Sister     Diabetes Daughter         Passed away January 2017        Objective:  /60 (BP Location: Left arm, Patient Position: Sitting, Cuff Size: Large)   Pulse 57   Temp 97 8 °F (36 6 °C) (Oral)   Resp 20   Ht 5' 4" (1 626 m)   Wt 69 7 kg (153 lb 9 6 oz)   LMP  (LMP Unknown)   SpO2 94%   BMI 26 37 kg/m²      Physical Exam   Constitutional: She is oriented to person, place, and time  She appears well-developed and well-nourished  HENT:   Head: Normocephalic and atraumatic  Cardiovascular: Normal rate, regular rhythm and normal heart sounds  Pulmonary/Chest: Effort normal and breath sounds normal  No respiratory distress  She has no wheezes  Neurological: She is alert and oriented to person, place, and time  Skin: Skin is warm and dry  See photo for full details  Warm, erythema surrounding wounds to L foot  Mild edema as well  Psychiatric: She has a normal mood and affect  Her behavior is normal  Judgment and thought content normal    Nursing note and vitals reviewed

## 2018-08-09 DIAGNOSIS — I48.92 ATRIAL FLUTTER, UNSPECIFIED TYPE (HCC): ICD-10-CM

## 2018-08-09 DIAGNOSIS — I48.0 PAROXYSMAL ATRIAL FIBRILLATION (HCC): ICD-10-CM

## 2018-08-09 PROBLEM — N18.30 CKD (CHRONIC KIDNEY DISEASE) STAGE 3, GFR 30-59 ML/MIN (HCC): Status: ACTIVE | Noted: 2018-08-09

## 2018-08-09 LAB
ANION GAP SERPL CALCULATED.3IONS-SCNC: 7 MMOL/L (ref 4–13)
ATRIAL RATE: 59 BPM
BASOPHILS # BLD AUTO: 0.06 THOUSANDS/ΜL (ref 0–0.1)
BASOPHILS NFR BLD AUTO: 1 % (ref 0–1)
BUN SERPL-MCNC: 19 MG/DL (ref 5–25)
CALCIUM SERPL-MCNC: 7.9 MG/DL (ref 8.3–10.1)
CHLORIDE SERPL-SCNC: 103 MMOL/L (ref 100–108)
CO2 SERPL-SCNC: 27 MMOL/L (ref 21–32)
CREAT SERPL-MCNC: 1.06 MG/DL (ref 0.6–1.3)
EOSINOPHIL # BLD AUTO: 0.35 THOUSAND/ΜL (ref 0–0.61)
EOSINOPHIL NFR BLD AUTO: 4 % (ref 0–6)
ERYTHROCYTE [DISTWIDTH] IN BLOOD BY AUTOMATED COUNT: 18.1 % (ref 11.6–15.1)
GFR SERPL CREATININE-BSD FRML MDRD: 50 ML/MIN/1.73SQ M
GLUCOSE SERPL-MCNC: 110 MG/DL (ref 65–140)
GLUCOSE SERPL-MCNC: 111 MG/DL (ref 65–140)
GLUCOSE SERPL-MCNC: 113 MG/DL (ref 65–140)
GLUCOSE SERPL-MCNC: 185 MG/DL (ref 65–140)
GLUCOSE SERPL-MCNC: 191 MG/DL (ref 65–140)
GLUCOSE SERPL-MCNC: 426 MG/DL (ref 65–140)
GLUCOSE SERPL-MCNC: 460 MG/DL (ref 65–140)
GLUCOSE SERPL-MCNC: 497 MG/DL (ref 65–140)
HCT VFR BLD AUTO: 32.4 % (ref 34.8–46.1)
HGB BLD-MCNC: 9.8 G/DL (ref 11.5–15.4)
IMM GRANULOCYTES # BLD AUTO: 0.03 THOUSAND/UL (ref 0–0.2)
IMM GRANULOCYTES NFR BLD AUTO: 0 % (ref 0–2)
INR PPP: 1.21 (ref 0.86–1.17)
LYMPHOCYTES # BLD AUTO: 1.14 THOUSANDS/ΜL (ref 0.6–4.47)
LYMPHOCYTES NFR BLD AUTO: 14 % (ref 14–44)
MCH RBC QN AUTO: 23.2 PG (ref 26.8–34.3)
MCHC RBC AUTO-ENTMCNC: 30.2 G/DL (ref 31.4–37.4)
MCV RBC AUTO: 77 FL (ref 82–98)
MONOCYTES # BLD AUTO: 0.85 THOUSAND/ΜL (ref 0.17–1.22)
MONOCYTES NFR BLD AUTO: 11 % (ref 4–12)
NEUTROPHILS # BLD AUTO: 5.56 THOUSANDS/ΜL (ref 1.85–7.62)
NEUTS SEG NFR BLD AUTO: 70 % (ref 43–75)
NRBC BLD AUTO-RTO: 0 /100 WBCS
P AXIS: 42 DEGREES
PLATELET # BLD AUTO: 214 THOUSANDS/UL (ref 149–390)
PMV BLD AUTO: 10.8 FL (ref 8.9–12.7)
POTASSIUM SERPL-SCNC: 4 MMOL/L (ref 3.5–5.3)
PR INTERVAL: 322 MS
PROTHROMBIN TIME: 15.4 SECONDS (ref 11.8–14.2)
QRS AXIS: -64 DEGREES
QRSD INTERVAL: 164 MS
QT INTERVAL: 554 MS
QTC INTERVAL: 548 MS
RBC # BLD AUTO: 4.22 MILLION/UL (ref 3.81–5.12)
SODIUM SERPL-SCNC: 137 MMOL/L (ref 136–145)
T WAVE AXIS: 95 DEGREES
VENTRICULAR RATE: 59 BPM
WBC # BLD AUTO: 7.99 THOUSAND/UL (ref 4.31–10.16)

## 2018-08-09 PROCEDURE — 80048 BASIC METABOLIC PNL TOTAL CA: CPT | Performed by: INTERNAL MEDICINE

## 2018-08-09 PROCEDURE — 99222 1ST HOSP IP/OBS MODERATE 55: CPT | Performed by: SURGERY

## 2018-08-09 PROCEDURE — 85610 PROTHROMBIN TIME: CPT | Performed by: SURGERY

## 2018-08-09 PROCEDURE — 93010 ELECTROCARDIOGRAM REPORT: CPT | Performed by: INTERNAL MEDICINE

## 2018-08-09 PROCEDURE — 85025 COMPLETE CBC W/AUTO DIFF WBC: CPT | Performed by: INTERNAL MEDICINE

## 2018-08-09 PROCEDURE — 82948 REAGENT STRIP/BLOOD GLUCOSE: CPT

## 2018-08-09 PROCEDURE — 99226 PR SBSQ OBSERVATION CARE/DAY 35 MINUTES: CPT | Performed by: INTERNAL MEDICINE

## 2018-08-09 RX ORDER — SODIUM CHLORIDE 9 MG/ML
75 INJECTION, SOLUTION INTRAVENOUS CONTINUOUS
Status: DISCONTINUED | OUTPATIENT
Start: 2018-08-10 | End: 2018-08-11

## 2018-08-09 RX ORDER — DIPHENHYDRAMINE HCL 25 MG
50 TABLET ORAL
Status: DISCONTINUED | OUTPATIENT
Start: 2018-08-10 | End: 2018-08-20 | Stop reason: SDUPTHER

## 2018-08-09 RX ORDER — ACETYLCYSTEINE 200 MG/ML
1200 SOLUTION ORAL; RESPIRATORY (INHALATION) 2 TIMES DAILY
Status: DISCONTINUED | OUTPATIENT
Start: 2018-08-09 | End: 2018-08-11

## 2018-08-09 RX ORDER — DIPHENHYDRAMINE HYDROCHLORIDE 50 MG/ML
50 INJECTION INTRAMUSCULAR; INTRAVENOUS ONCE
Status: COMPLETED | OUTPATIENT
Start: 2018-08-09 | End: 2018-08-09

## 2018-08-09 RX ORDER — OXYCODONE HYDROCHLORIDE 5 MG/1
2.5 TABLET ORAL EVERY 4 HOURS PRN
Status: DISCONTINUED | OUTPATIENT
Start: 2018-08-09 | End: 2018-08-10

## 2018-08-09 RX ORDER — CEFAZOLIN SODIUM 1 G/3ML
1000 INJECTION, POWDER, FOR SOLUTION INTRAMUSCULAR; INTRAVENOUS EVERY 8 HOURS
Status: DISCONTINUED | OUTPATIENT
Start: 2018-08-10 | End: 2018-08-10 | Stop reason: CLARIF

## 2018-08-09 RX ORDER — METHYLPREDNISOLONE 16 MG/1
32 TABLET ORAL
Status: COMPLETED | OUTPATIENT
Start: 2018-08-09 | End: 2018-08-10

## 2018-08-09 RX ORDER — METHYLPREDNISOLONE SODIUM SUCCINATE 125 MG/2ML
125 INJECTION, POWDER, LYOPHILIZED, FOR SOLUTION INTRAMUSCULAR; INTRAVENOUS ONCE
Status: COMPLETED | OUTPATIENT
Start: 2018-08-09 | End: 2018-08-09

## 2018-08-09 RX ORDER — CEFAZOLIN SODIUM 1 G/3ML
1000 INJECTION, POWDER, FOR SOLUTION INTRAMUSCULAR; INTRAVENOUS EVERY 8 HOURS
Status: DISCONTINUED | OUTPATIENT
Start: 2018-08-09 | End: 2018-08-09

## 2018-08-09 RX ADMIN — METHYLPREDNISOLONE 32 MG: 16 TABLET ORAL at 21:07

## 2018-08-09 RX ADMIN — LEVETIRACETAM 750 MG: 750 TABLET ORAL at 21:08

## 2018-08-09 RX ADMIN — FLUTICASONE PROPIONATE 1 SPRAY: 50 SPRAY, METERED NASAL at 11:15

## 2018-08-09 RX ADMIN — METOPROLOL TARTRATE 12.5 MG: 25 TABLET ORAL at 21:08

## 2018-08-09 RX ADMIN — FLUTICASONE PROPIONATE 2 PUFF: 110 AEROSOL, METERED RESPIRATORY (INHALATION) at 11:17

## 2018-08-09 RX ADMIN — CEFEPIME HYDROCHLORIDE 1000 MG: 1 INJECTION, SOLUTION INTRAVENOUS at 01:30

## 2018-08-09 RX ADMIN — LEVOTHYROXINE SODIUM 100 MCG: 100 TABLET ORAL at 06:09

## 2018-08-09 RX ADMIN — ATORVASTATIN CALCIUM 80 MG: 80 TABLET, FILM COATED ORAL at 11:14

## 2018-08-09 RX ADMIN — VANCOMYCIN HYDROCHLORIDE 1000 MG: 1 INJECTION, SOLUTION INTRAVENOUS at 06:12

## 2018-08-09 RX ADMIN — OXYCODONE HYDROCHLORIDE 2.5 MG: 5 TABLET ORAL at 14:28

## 2018-08-09 RX ADMIN — ACETYLCYSTEINE 1200 MG: 200 INHALANT RESPIRATORY (INHALATION) at 17:18

## 2018-08-09 RX ADMIN — INSULIN LISPRO 5 UNITS: 100 INJECTION, SOLUTION INTRAVENOUS; SUBCUTANEOUS at 16:46

## 2018-08-09 RX ADMIN — ACETYLCYSTEINE 1200 MG: 200 INHALANT RESPIRATORY (INHALATION) at 14:28

## 2018-08-09 RX ADMIN — Medication 1000 MG: at 16:45

## 2018-08-09 RX ADMIN — PANTOPRAZOLE SODIUM 20 MG: 20 TABLET, DELAYED RELEASE ORAL at 06:09

## 2018-08-09 RX ADMIN — FLUTICASONE PROPIONATE 2 PUFF: 110 AEROSOL, METERED RESPIRATORY (INHALATION) at 21:10

## 2018-08-09 RX ADMIN — AMLODIPINE BESYLATE 5 MG: 5 TABLET ORAL at 11:14

## 2018-08-09 RX ADMIN — Medication 400 MG: at 00:04

## 2018-08-09 RX ADMIN — INSULIN GLARGINE 20 UNITS: 100 INJECTION, SOLUTION SUBCUTANEOUS at 11:16

## 2018-08-09 RX ADMIN — LEVETIRACETAM 750 MG: 750 TABLET ORAL at 11:14

## 2018-08-09 RX ADMIN — INSULIN LISPRO 5 UNITS: 100 INJECTION, SOLUTION INTRAVENOUS; SUBCUTANEOUS at 21:10

## 2018-08-09 RX ADMIN — ASPIRIN 81 MG 81 MG: 81 TABLET ORAL at 11:16

## 2018-08-09 RX ADMIN — Medication 400 MG: at 11:15

## 2018-08-09 RX ADMIN — METHYLPREDNISOLONE SODIUM SUCCINATE 125 MG: 125 INJECTION, POWDER, FOR SOLUTION INTRAMUSCULAR; INTRAVENOUS at 09:00

## 2018-08-09 RX ADMIN — Medication 1000 MG: at 11:15

## 2018-08-09 RX ADMIN — INSULIN LISPRO 1 UNITS: 100 INJECTION, SOLUTION INTRAVENOUS; SUBCUTANEOUS at 11:20

## 2018-08-09 RX ADMIN — LORATADINE 10 MG: 10 TABLET ORAL at 11:16

## 2018-08-09 RX ADMIN — METOPROLOL TARTRATE 12.5 MG: 25 TABLET ORAL at 11:14

## 2018-08-09 RX ADMIN — FLUTICASONE PROPIONATE 2 PUFF: 110 AEROSOL, METERED RESPIRATORY (INHALATION) at 00:10

## 2018-08-09 RX ADMIN — METOPROLOL TARTRATE 12.5 MG: 25 TABLET ORAL at 00:00

## 2018-08-09 RX ADMIN — INSULIN LISPRO 1 UNITS: 100 INJECTION, SOLUTION INTRAVENOUS; SUBCUTANEOUS at 00:06

## 2018-08-09 RX ADMIN — AMIODARONE HYDROCHLORIDE 200 MG: 200 TABLET ORAL at 11:16

## 2018-08-09 RX ADMIN — DIPHENHYDRAMINE HYDROCHLORIDE 50 MG: 50 INJECTION, SOLUTION INTRAMUSCULAR; INTRAVENOUS at 08:55

## 2018-08-09 RX ADMIN — FERROUS SULFATE TAB 325 MG (65 MG ELEMENTAL FE) 325 MG: 325 (65 FE) TAB at 11:13

## 2018-08-09 RX ADMIN — OXYCODONE HYDROCHLORIDE 2.5 MG: 5 TABLET ORAL at 21:08

## 2018-08-09 RX ADMIN — INSULIN GLARGINE 20 UNITS: 100 INJECTION, SOLUTION SUBCUTANEOUS at 21:09

## 2018-08-09 RX ADMIN — ACETAMINOPHEN 650 MG: 325 TABLET, FILM COATED ORAL at 10:38

## 2018-08-09 RX ADMIN — Medication 400 MG: at 17:18

## 2018-08-09 NOTE — PLAN OF CARE
Problem: Potential for Falls  Goal: Patient will remain free of falls  INTERVENTIONS:  - Assess patient frequently for physical needs  -  Identify cognitive and physical deficits and behaviors that affect risk of falls  -  [place] fall precautions as indicated by assessment   - Educate patient/family on patient safety including physical limitations  - Instruct patient to call for assistance with activity based on assessment  - Modify environment to reduce risk of injury  - Consider OT/PT consult to assist with strengthening/mobility   Outcome: Progressing      Problem: Prexisting or High Potential for Compromised Skin Integrity  Goal: Skin integrity is maintained or improved  INTERVENTIONS:  - Identify patients at risk for skin breakdown  - Assess and monitor skin integrity  - Assess and monitor nutrition and hydration status  - Monitor labs (i e  albumin)  - Assess for incontinence   - Turn and reposition patient  - Assist with mobility/ambulation  - Relieve pressure over bony prominences  - Avoid friction and shearing  - Provide appropriate hygiene as needed including keeping skin clean and dry  - Evaluate need for skin moisturizer/barrier cream  - Collaborate with interdisciplinary team (i e  Nutrition, Rehabilitation, etc )   - Patient/family teaching   Outcome: Progressing      Problem: Nutrition/Hydration-ADULT  Goal: Nutrient/Hydration intake appropriate for improving, restoring or maintaining nutritional needs  Monitor and assess patient's nutrition/hydration status for malnutrition (ex- brittle hair, bruises, dry skin, pale skin and conjunctiva, muscle wasting, smooth red tongue, and disorientation)  Collaborate with interdisciplinary team and initiate plan and interventions as ordered  Monitor patient's weight and dietary intake as ordered or per policy  Utilize nutrition screening tool and intervene per policy   Determine patient's food preferences and provide high-protein, high-caloric foods as appropriate       INTERVENTIONS:  - Monitor oral intake, urinary output, labs, and treatment plans  - Assess nutrition and hydration status and recommend course of action  - Evaluate amount of meals eaten  - Assist patient with eating if necessary   - Allow adequate time for meals  - Recommend/ encourage appropriate diets, oral nutritional supplements, and vitamin/mineral supplements  - Order, calculate, and assess calorie counts as needed  - Recommend, monitor, and adjust tube feedings and TPN/PPN based on assessed needs  - Assess need for intravenous fluids  - Provide specific nutrition/hydration education as appropriate  - Include patient/family/caregiver in decisions related to nutrition   Outcome: Progressing

## 2018-08-09 NOTE — CONSULTS
Vascular Surgery    Consult- Aria Moura 1940, [de-identified] y o  female MRN: [id number]    Unit/Bed#: CW2 210-02 Encounter: [id number]    Primary Care Provider: Nicolas Peterson MD   Date and time admitted to hospital: 8/8/2018  3:40 PM      Inpatient consult to Vascular Surgery  Consult performed by: Kevin Jeronimo ordered by: Albert Media        PAD (peripheral artery disease) ([facility] )   Assessment & Plan    Severe PAD with bilateral dry gangrene of toes in setting of DM type 2  Contrast Allergy    Hx Diagnostic LLE Agram 12/2017 w/ severe tibial disease and no bypass targets    Most recent LEAD 7/5/18-   · Right SHADI nc, MTP 37, GTP 0  · Left SHADI 0 89, MTP 25, GTP 0, distal LSFA ~50%    Plan:  --RLE Arteriogram planned for today as outpatient postponed until tomorrow due to IV infiltration of contrast prep for dye allergy  --12 H IV contrast dye prep ordered for tomorrow  --NPO after MN tonight  --NS @ 75 ml/hr to start at [place]  --Medical management DM            At[facility] flutter ([facility] )   Assessment & Plan    Afib/flutter    Plan:  --Management per medicine  --Hold Xarelto        CKD (chronic kidney disease) stage 3, GFR 30-59 ml/min   Assessment & Plan    Creatinine Stable 1 06    Plan:  --Monitor post Agram  --Renal prep for Agram tomorrow: IVF/Mucomyst  --Hold Lasix & Lisinopril        * Cellulitis   Assessment & Plan    Possible mild cellulitis in setting of PAD with dry gangrene     Plan:  --ABX as per medicine            Chief Complaint: LLE swelling and redness, bilateral leg pain and chills    HPI: Aria Moura is a [de-identified] y o  female who is well known to the vascular service and was scheduled for outpatient RLE arteriogram with Dr Sonny Carballo today in IR presents yesterday with one day of left foot swelling and redness  She has a known history of severe PAD with chronic tissue loss involving both feet  In December 2017 she underwent diagnostic LLE arteriogram she was noted to have severe tibial disease with no options for revascularization  Her most recent arterial duplex reveals noncompressible SHADI on the right with metatarsal pressure of 37, great toe pressure is 0 and on the left SHADI is unreliable at 0 89 with metatarsal pressure of 25 and great toe pressure of 0  She has DM type 2, CKD stage 3, contrast dye allergy, hx of AS s/p TAVR, CHF, Afib/flutter on Xarelto, pulmonary HTN, hx of MI, Hx of CVA, HTN, hyperlipidemia, chronic anemia and seizures  She was seen in her PCP's office yesterday and was sent for admission to the hospital  She has been placed on IV antibiotic therapy for suspected cellulitis  This morning she was taken to IR for planned arteriogram however she required emergency IV prep for contrast allergy and unfortunately her IV site infiltrated  Arteriogram is being postponed until tomorrow        Review of Systems:  General: positive for  - chills  negative for - fever, hot flashes, malaise, night sweats or weight loss  Cardiovascular: no chest pain or dyspnea on exertion  Respiratory: no cough, shortness of breath, or wheezing  Gastrointestinal: no abdominal pain, change in bowel habits, or black or bloody stools  Genitourinary ROS: no dysuria, trouble voiding, or hematuria  Musculoskeletal ROS: positive for - pain in leg - bilateral  negative for - gait disturbance, joint stiffness or muscular weakness  Neurological ROS: no TIA or stroke symptoms  Hematological and Lymphatic ROS: negative  Dermatological ROS: negative  Psychological ROS: negative  Ophthalmic ROS: negative  ENT ROS: negative    Past Medical History:  Past Medical History:   Diagnosis Date    Altered mental status     Altered mental status     Anemia     Anticoagulated     Coumadin for Aflutter    Asthma     Atrial flutter (HCC)     maintained on coumadin anticoag    Bradycardia     CAD (coronary artery disease)     Candidiasis     Carotid stenosis, bilateral     Cataract     Chest pain     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) ([facility])     Chronic fatigue syndrome     Chronic low back pain     Chronic ulcer of toes ([facility])     left and right    Concussion w loss of consciousness of unsp duration, init     Coronary artery disease     CVA (cerebral vascular accident) ([facility] ) 4/17/2018    Diabetes mellitus ([facility] )     type 2, insulin dependent    DJD (degenerative joint disease)     Expressive aphasia     Foot pain     GERD (gastroesophageal reflux disease)     Herpes zoster     HLD (hyperlipidemia)     HTN (hypertension)     Hyperlipidemia     Hypothyroidism     Irritable bowel syndrome     Kidney stone     Lumbar radiculopathy     Lyme disease     Dx in hospital 8/2011    Lyme disease     MI (myocardial infarction) ([facility] )     Migraines     Muscle spasm     Non-neoplastic nevus     Nontoxic multinodular goiter     NSVT (nonsustained ventricular tachycardia) ([facility])     OA (osteoarthritis)     Osteoarthritis     Other chronic pain     PAD (peripheral artery disease) ([facility])     Palpitations     Paroxysmal atrial fibrillation ([facility])     Pseudogout     Pulmonary hypertension ([facility])     S/P TAVR (transcatheter aortic valve replacement)     Seizures ([facility])     Severe sepsis ([facility] )     Spinal stenosis     Stroke ([place] [id number] ) 05/2018    Transient cerebral ischemia     Trigger ring finger     Viral gastroenteritis        Past Surgical History:  Past Surgical History:   Procedure Laterality Date    APPENDECTOMY      ARTERIOGRAM  12/19/2017    CARDIAC CATHETERIZATION      CHOLECYSTECTOMY      COLONOSCOPY      CORONARY ARTERY BYPASS GRAFT  2004    LUMBAR LAMINECTOMY      MO ECHO TRANSESOPHAG R-T 2D W/PRB IMG ACQUISJ I&R N/A 4/3/2018    Procedure: TRANSESOPHAGEAL ECHOCARDIOGRAM (ROBB);   Surgeon: Osmin Sutton DO;  Location: [facility];  Service: Cardiac Surgery    MO REPLACE AORTIC VALVE OPENFEMORAL ARTERY APPROACH N/A 4/3/2018    Procedure: REPLACEMENT AORTIC VALVE TRANSCATHETER (TAVR) TRANSFEMORAL w/ 26MM IVY YEVGENIY S3 VALVE;  Surgeon: Nohemy Villasenor DO;  Location: [facility];  Service: Cardiac Surgery    THYROIDECTOMY      TOTAL ABDOMINAL HYSTERECTOMY W/ BILATERAL SALPINGOOPHORECTOMY         Social History:  History   Alcohol Use No     History   Drug Use No     History   Smoking Status    Never Smoker   Smokeless Tobacco    Never Used       Family History:  Family History   Problem Relation Age of Onset    Cancer Mother     Stroke Mother     Cancer Father     Heart disease Father     Breast cancer Sister     Diabetes Daughter         Passed away January 2017        Allergies:   Allergies   Allergen Reactions    Other Chest Pain     IVP-listed as "chest pain" in previous chart, patient stated this occurred "a long time ago" but does not remember exactly occurred     Cephalosporins Chest Pain    Contrast [Iodinated Diagnostic Agents] Other (See Comments)     Flash pulm edema    Doxycycline Chest Pain    Ketorolac Other (See Comments)     Chest pain     Levaquin [Levofloxacin] Chest Pain    Ondansetron Chest Pain     Prolonged QT    Toradol [Ketorolac Tromethamine] Chest Pain       Medications:  Current Facility-Administered Medications   Medication Dose Route Frequency    acetaminophen (TYLENOL) tablet 650 mg  650 mg Oral Q6H PRN    albuterol (PROVENTIL HFA,VENTOLIN HFA) inhaler 1 puff  1 puff Inhalation BID PRN    amiodarone tablet 200 mg  200 mg Oral Daily With Breakfast    amLODIPine (NORVASC) tablet 5 mg  5 mg Oral Daily    aspirin chewable tablet 81 mg  81 mg Oral Daily    atorvastatin (LIPITOR) tablet 80 mg  80 mg Oral Daily    calcium carbonate (TUMS) chewable tablet 1,000 mg  1,000 mg Oral TID    cefepime (MAXIPIME) IVPB (premix) 1,000 mg  1,000 mg Intravenous Q24H    docusate sodium (COLACE) capsule 100 mg  100 mg Oral BID PRN    ferrous sulfate tablet 325 mg  325 mg Oral Daily With Breakfast    fluticasone (FLONASE) 50 mcg/act nasal spray 1 spray  1 spray Nasal Daily    fluticasone (FLOVENT HFA) 110 MCG/ACT inhaler 2 puff  2 puff Inhalation Q12H [facility]    insulin glargine (LANTUS) subcutaneous injection 20 Units 0 2 mL  20 Units Subcutaneous Q12H [facility]    insulin lispro (HumaLOG) 100 units/mL subcutaneous injection 1-5 Units  1-5 Units Subcutaneous TID AC    insulin lispro (HumaLOG) 100 units/mL subcutaneous injection 1-5 Units  1-5 Units Subcutaneous HS    levETIRAcetam (KEPPRA) tablet 750 mg  750 mg Oral Q12H LANCE    levothyroxine tablet 100 mcg  100 mcg Oral Early Morning    loratadine (CLARITIN) tablet 10 mg  10 mg Oral Daily    magnesium oxide (MAG-OX) tablet 400 mg  400 mg Oral BID    metoclopramide (REGLAN) tablet 5 mg  5 mg Oral Q6H PRN    metoprolol tartrate (LOPRESSOR) partial tablet 12 5 mg  12 5 mg Oral Q12H LANCE    nitroglycerin (NITROSTAT) SL tablet 0 4 mg  0 4 mg Sublingual Q3 min PRN    pantoprazole (PROTONIX) EC tablet 20 mg  20 mg Oral Early Morning    polyethylene glycol (MIRALAX) packet 17 g  17 g Oral Daily PRN    sodium chloride 0 9 % infusion  75 mL/hr Intravenous Continuous    vancomycin (VANCOCIN) IVPB (premix) 1,000 mg  12 5 mg/kg Intravenous Q12H       Vitals:  /72 (BP Location: Left arm)   Pulse 58   Temp 98 °F (36 7 °C) (Oral)   Resp 18   Ht 5' 4" (1 626 m)   Wt 73 1 kg (161 lb 1 6 oz)   LMP  (LMP Unknown)   SpO2 93%   BMI 27 65 kg/m²     I/Os:  I/O last 24 hours:   In: 200 [IV Piggyback:200]  Out: 750 [Urine:750]    Lab Results and Cultures:   Lab Results   Component Value Date    WBC 7 99 08/09/2018    HGB 9 8 (L) 08/09/2018    HCT 32 4 (L) 08/09/2018    MCV 77 (L) 08/09/2018     08/09/2018     Lab Results   Component Value Date    GLUCOSE 111 08/09/2018    CALCIUM 7 9 (L) 08/09/2018     08/09/2018    K 4 0 08/09/2018    CO2 27 08/09/2018     08/09/2018    BUN 19 08/09/2018    CREATININE 1 06 08/09/2018     Lab Results   Component Value Date    INR 1 21 (H) 08/09/2018    INR 1 75 (H) 05/01/2018    INR 1 61 (H) 05/01/2018    PROTIME 15 4 (H) 08/09/2018    PROTIME 20 6 (H) 05/01/2018    PROTIME 19 3 (H) 05/01/2018       Physical Exam:    General appearance: alert and oriented, in no acute distress  Skin: Skin color, texture, turgor normal  No rashes or lesions or See wounds pictured below and extremitiy exam  Neurologic: Mental status: Alert, oriented, thought content appropriate  Cranial nerves: normal  Sensory: normal  Motor: grossly normal  Head: Normocephalic, without obvious abnormality, atraumatic  Eyes: conjunctivae/corneas clear  PERRL, EOM's intact  Fundi benign  Throat: lips, mucosa, and tongue normal; teeth and gums normal  Neck: no adenopathy, no carotid bruit, no JVD, supple, symmetrical, trachea midline and thyroid not enlarged, symmetric, no tenderness/mass/nodules  Back: symmetric, no curvature  ROM normal  No CVA tenderness    Lungs: clear to auscultation bilaterally  Chest wall: no tenderness  Heart: irregularly irregular rhythm, S1/S2 nl, +murmur  Abdomen: soft, non-tender; bowel sounds normal; no masses,  no organomegaly  Extremities: B/L Hallux w/ dry gangrene Left 2nd toe and R 3rd toe with dry gangrene, Left foot with mild erythema of the dorsal foot, +edema Left foot, +ruboe B/L, M/S intact    Wound/Incision:                      Pulse exam:  Femoral: Right: 2+ Left: 2+  Popliteal: Right: 2+ Left: 2+  DP: Right: non-palpable Left: non-palpable  PT: Right: non-palpable Left: non-palpable      Denice Sarmiento PA-C  8/9/2018

## 2018-08-09 NOTE — ASSESSMENT & PLAN NOTE
Severe PAD with bilateral dry gangrene of toes in setting of DM type 2  Contrast Allergy    Hx Diagnostic LLE Agram 12/2017 w/ severe tibial disease and no bypass targets    Most recent LEAD 7/5/18-   · Right SAHDI nc, MTP 37, GTP 0  · Left SHADI 0 89, MTP 25, GTP 0, distal LSFA ~50%    Plan:  --RLE Arteriogram planned for today as outpatient postponed until tomorrow due to IV infiltration of contrast prep for dye allergy  --12 H IV contrast dye prep ordered for tomorrow  --NPO after MN tonight  --NS @ 75 ml/hr to start at [place]  --Medical management DM

## 2018-08-09 NOTE — CONSULTS
Progress Note - Wound   Mary Anne Martínez [de-identified] y o  female MRN: [id number]  Unit/Bed#: CW2 210-02 Encounter: [id number]      Assessment:  Wound care consulted for assessment of lower extremity wounds  Patient seen as she was transferring to go to  for a-gram this morning with vascular team who she follows with as an outpatient  Vascular is following along with patient for her lower extremity wounds  Would also recommend podiatry consult if needed  Patient is assist of one with transfers, continent of bowel and bladder, and is agreeable for assessment of her buttocks/sacrum  Sacrum is intact with blanchable redness, no maceration noted  B/l buttocks are intact with no redness or wounds  Recommended preventative skin care plan  Discussed use of foam as preventative measure for procedures  Primary nurse aware of plan of care and assessment, was at bedside assisting with transfer  Wound care will sign off  Plan:   1  Apply hydraguard to b/l heels, buttocks and sacrum BID and PRN for prevention and protection  2  Apply skin nourishing cream the entire skin daily for moisture  3  Turn and reposition patient every  2 hours   4  Elevate heels off of bed with pillows to offload pressure   5  Soft care cushion to chair when OOB, if able  6  Apply alleyvn sacral foam prior to procedures for prevention and protection  Assess the skin every shift and as needed  Change every 5 days and as needed  7  Wound to lower extremities as per vascular  8  Consider podiatry consult     Objective:    Vitals: Blood pressure 162/72, pulse 58, temperature 98 °F (36 7 °C), temperature source Oral, resp  rate 18, height 5' 4" (1 626 m), weight 73 1 kg (161 lb 1 6 oz), SpO2 93 %, not currently breastfeeding  ,Body mass index is 27 65 kg/m²  Recommendations written as orders  Wound care will sign off     Felicity Easton RN BSN [facility]ON

## 2018-08-09 NOTE — SEDATION DOCUMENTATION
Patient seen in IR by Dr Kiera Ware and will be rescheduled for arteriogram tomorrow due to inadequate prep for contrast allergy  Patient and son aware

## 2018-08-09 NOTE — CASE MANAGEMENT
Initial Clinical Review    Admission: Date/Time/Statement: 08/08/2018 @ 1800  Orders Placed This Encounter   Procedures    Place in Observation (expected length of stay for this patient is less than two midnights)     Standing Status:   Standing     Number of Occurrences:   1     Order Specific Question:   Admitting Physician     Answer:   Carla Garcia [[id number]]     Order Specific Question:   Level of Care     Answer:   Med Surg [[id number]]       ED: Date/Time/Mode of Arrival:   ED Arrival Information     Expected Arrival Acuity Means of Arrival Escorted By Service Admission Type    - 8/8/2018 15:39 Urgent Ambulance [facility] General Medicine Urgent    Arrival Complaint    - bilateral foot and toe wounds          Chief Complaint:   Chief Complaint   Patient presents with    Foot Pain     pt comes from PCP with bilatal foot pain, venous ulcers present on both feet       History of Illness:   Christophe Valentin is a [de-identified] y o  female with a PMHx DVT/PE on Xarelto, hypertension, type 2 diabetes mellitus with complications, paroxysmal atrial fibrillation, peripheral artery disease, cerebrovascular accident, CHF, GERD, hyperlipidemia who presents with bilateral foot and toe wounds  Patient states that she woke up this morning and noticed increased erythema and pain of her left foot  She also endorsed fevers/chills at this time  She noticed yellow discharge coming from the wounds as well  She then presented to her PCP office and was instructed to present to the emergency department   She was scheduled for lower extremity angiography on 8/9/2018 which was canceled due to her admission to the hospital      ED Vital Signs:   ED Triage Vitals [08/08/18 1544]   Temperature Pulse Respirations Blood Pressure SpO2   97 8 °F (36 6 °C) 58 20 (!) 177/71 96 %      Temp Source Heart Rate Source Patient Position - Orthostatic VS BP Location FiO2 (%)   Oral Monitor Lying Right arm --      Pain Score       8        Wt Readings from Last 1 Encounters:   08/08/18 73 1 kg (161 lb 1 6 oz)     Abnormal Labs/Diagnostic Test Results:   WBC Thousand/uL 10 60*   RBC Million/uL 4 64   HEMOGLOBIN g/dL 10 8*   HEMATOCRIT % 35 2     SODIUM mmol/L 136   POTASSIUM mmol/L 4 3   CHLORIDE mmol/L 101   CO2 mmol/L 26   ANION GAP mmol/L 9   BUN mg/dL 23   CREATININE mg/dL 1 15   GLUCOSE RANDOM mg/dL 135   CALCIUM mg/dL 8 8   AST U/L 22   ALT U/L 27   ALK PHOS U/L 58   TOTAL PROTEIN g/dL 8 3*   BILIRUBIN TOTAL mg/dL 0 55   EGFR ml/min/1 73sq m 45     Chest X:  cardiomegaly  No acute cardiopulmonary disease  ED Treatment:   Medication Administration from 08/08/2018 1539 to 08/08/2018 1906       Date/Time Order Dose Route Action     08/08/2018 1710 vancomycin (VANCOCIN) IVPB (premix) 1,000 mg 1,000 mg Intravenous Given     08/08/2018 1707 acetaminophen (TYLENOL) tablet 975 mg 975 mg Oral Given          Past Medical/Surgical History:    Active Ambulatory Problems     Diagnosis Date Noted    Essential hypertension 12/19/2017    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 native heart without angina pectoris 12/19/2017    Type 2 diabetes mellitus with complication, with long-term current use of insulin ([facility] ) 12/19/2017    Atrial flutter ([facility] ) 12/19/2017    Hyperlipidemia 12/19/2017    Gastroesophageal reflux disease without esophagitis 12/19/2017    Moderate mitral stenosis 12/19/2017    Arthritis of hand 12/26/2014    Acute respiratory failure with hypoxia ([address] ) 03/16/2018    Asthma 03/16/2018    Asymptomatic carotid artery stenosis, bilateral 11/16/2017    Atherosclerosis of native artery of both lower extremities with bilateral ulceration ([address] ) 06/09/2017    Hx of CABG 01/15/2018    Chronic anticoagulation 08/22/2016    Chronic combined systolic and diastolic congestive heart failure ([address] ) 01/15/2018    Degenerative joint disease involving multiple joints 07/28/2017    Diabetic retinopathy ([address] ) 08/02/2016    Postoperative hypothyroidism 10/16/2013    Migraine headache 10/18/2017    Nephrolithiasis 12/17/2015    Osteoarthritis of both knees 11/23/2015    Paroxysmal atrial fibrillation ([facility] ) 01/15/2018    Ulcer of toe of left foot ([facility] ) 10/23/2017    Ulcer of toe of right foot ([facility] ) 03/26/2018    Prolonged Q-T interval on ECG 03/28/2018    Hypokalemia 04/03/2018    Hypocalcemia 04/03/2018    Left bundle branch block (LBBB) 04/03/2018    1st degree AV block 04/03/2018    S/P TAVR (transcatheter aortic valve replacement) 04/06/2018    Expressive aphasia 04/16/2018    Multiple lacunar infarcts ([facility] ) 04/24/2018    Vomiting 04/28/2018    History of CVA (cerebrovascular accident) 04/28/2018    Seizure ([facility] ) 04/29/2018    Supratherapeutic INR 04/29/2018    NSVT (nonsustained ventricular tachycardia) ([facility] ) 04/29/2018    Pulmonary hypertension ([facility] ) 04/30/2018    Anemia 04/30/2018    Severe sepsis ([facility] ) 05/01/2018    Lactic acidosis 05/01/2018    Hypomagnesemia 05/26/2018    Bradycardia 06/19/2018    Chest pain 06/19/2018    Foot pain 06/27/2018    PAD (peripheral artery disease) ([facility] ) 08/08/2018    Cellulitis 08/08/2018     Resolved Ambulatory Problems     Diagnosis Date Noted    NSTEMI (non-ST elevated myocardial infarction) ([facility] ) 12/19/2017    Critical aortic valve stenosis 12/19/2017    LEONARD (acute kidney injury) ([facility] ) 12/19/2017    Acute combined systolic and diastolic congestive heart failure ([facility] ) 07/05/2016    URI, acute 03/12/2018    Acute cardiogenic pulmonary edema ([facility] ) 03/15/2018    Atrial fibrillation with rapid ventricular response (HCC)     Acute on chronic diastolic CHF (congestive heart failure) ([facility] ) 03/28/2018   [facility] discharge follow-up 03/28/2018    Acute respiratory insufficiency, postoperative 04/03/2018    CVA (cerebral vascular accident) ([facility] ) 04/17/2018    Encephalopathy acute 04/28/2018    Altered mental status     Vomiting and diarrhea 05/24/2018     Past Medical History:   Diagnosis Date    Altered mental status     Altered mental status     Anemia     Anticoagulated     Asthma     Atrial flutter (HCC)     Bradycardia     CAD (coronary artery disease)     Candidiasis     Carotid stenosis, bilateral     Cataract     Chest pain     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) [facility]     Chronic fatigue syndrome     Chronic low back pain     Chronic ulcer of toes (HCC)     Concussion w loss of consciousness of unsp duration, init     Coronary artery disease     CVA (cerebral vascular accident) ([facility] ) 4/17/2018    Diabetes mellitus ([place] [address] )     DJD (degenerative joint disease)     Expressive aphasia     Foot pain     GERD (gastroesophageal reflux disease)     Herpes zoster     HLD (hyperlipidemia)     HTN (hypertension)     Hyperlipidemia     Hypothyroidism     Irritable bowel syndrome     Kidney stone     Lumbar radiculopathy     Lyme disease     Lyme disease     [facility]     Migraines     Muscle spasm     Non-neoplastic nevus     Nontoxic multinodular goiter     NSVT (nonsustained ventricular tachycardia) ([facility])     OA (osteoarthritis)     Osteoarthritis     Other chronic pain     PAD (peripheral artery disease) ([facility])     Palpitations     Paroxysmal atrial fibrillation (HCC)     Pseudogout     Pulmonary hypertension (HCC)     S/P TAVR (transcatheter aortic valve replacement)     Seizures ([facility])     Severe sepsis ([facility])     Spinal stenosis     Stroke ([facility] ) 05/2018    Transient cerebral ischemia     Trigger ring finger     Viral gastroenteritis        Admitting Diagnosis: Cellulitis [L03 90]  Foot pain [M79 673]    Age/Sex: [de-identified] y o  female    Assessment/Plan:   1   B/L Toe Wounds with Cellulitis  -patient with a history of PVD and recurrent cellulitis  -endorses worsening erythema and yellow drainage from wounds as well as subjective fevers/chills  -VSS on presentation with normal WBC   -blood cultures pending   -Vancomycin started in the ED   -Vancomycin 1000 mg daily  -Cefepime 1000 mg daily   -F/U blood cultures  -Wound Care consulted      2  Combined Diastolic & Systolic Heart Failure   -ROBB on 9/2/9351 showed systolic function was moderately to markedly reduced  Ejection fraction was estimated to be 35 %   -appears euvolemic on exam   -Continue Furosemide 40 mg daily   -Continue Lisinopril 5 mg     3  CAD   -ASA 81 mg   -Atorvastatin 80 mg  -Metoprolol 12 5 mg     4  Paroxysmal Atrial Fibrillation  -NSR  -rates well controlled  -Amiodarone 200 mg  -Xarelto 20 mg daily      5  Type 2 DM  -Metformin and Lantus at home   -Hold Metformin  -Lantus 20 units q12 hours  -Humalog with meals and daily at bedtime (algorithm 2)     6  GERD  -Pantoprazole 20 mg      7  Microcytic Anemia  -Hgb 10 8   -Ferrous sulfate      8  Asthma  -Albuterol prn  -Fluticasone 2 puffs q12 hours      9  Seizure Disorder  -Keppra 750 mg q12 hours      10   Hypothyroidism  Levothyroxine 1000 mcg         Code Status: Level 1 - Full Code  VTE Pharmacologic Prophylaxis: Reason for no pharmacologic prophylaxis Xarelto   VTE Mechanical Prophylaxis: sequential compression device-right leg  Admission Status: OBSERVATION    Admission Orders:  Scheduled Meds:   Current Facility-Administered Medications:  acetaminophen 650 mg Oral Q6H PRN    albuterol 1 puff Inhalation BID PRN    amiodarone 200 mg Oral Daily With Breakfast    amLODIPine 5 mg Oral Daily    aspirin 81 mg Oral Daily    atorvastatin 80 mg Oral Daily    calcium carbonate 1,000 mg Oral TID    cefepime 1,000 mg Intravenous Q24H Last Rate: 1,000 mg (08/09/18 0130)   docusate sodium 100 mg Oral BID PRN    ferrous sulfate 325 mg Oral Daily With Breakfast    fluticasone 1 spray Nasal Daily    fluticasone 2 puff Inhalation Q12H LANCE    furosemide 40 mg Oral Daily    insulin glargine 20 Units Subcutaneous Q12H LANCE    insulin lispro 1-5 Units Subcutaneous TID AC    insulin lispro 1-5 Units Subcutaneous HS    levETIRAcetam 750 mg Oral Q12H [facility]    levothyroxine 100 mcg Oral Early Morning    lisinopril 5 mg Oral Daily    loratadine 10 mg Oral Daily    magnesium oxide 400 mg Oral BID    metoclopramide 5 mg Oral Q6H PRN    metoprolol tartrate 12 5 mg Oral Q12H [facility]    nitroglycerin 0 4 mg Sublingual Q3 min PRN    pantoprazole 20 mg Oral Early Morning    polyethylene glycol 17 g Oral Daily PRN    rivaroxaban 20 mg Oral Daily    sodium chloride 75 mL/hr Intravenous Continuous Last Rate: 75 mL/hr (08/08/18 2130)   vancomycin 12 5 mg/kg Intravenous Q12H Last Rate: 1,000 mg (08/09/18 0612)     Continuous Infusions:   sodium chloride 75 mL/hr Last Rate: 75 mL/hr (08/08/18 2130)     Consult Vascular Surgery  Consult [facility]

## 2018-08-09 NOTE — PROGRESS NOTES
IM Residency Progress Note   Unit/Bed#: CW2 210-02 Encounter: [id number]  SOD Team C       Will Quispe [de-identified] y o  female [id number]    Hospital Stay Days: 0      Assessment/Plan:    Principal Problem:    Cellulitis  Active Problems:    Essential hypertension    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 native heart without angina pectoris    Type 2 diabetes mellitus with complication, with long-term current use of insulin (HCC)    Atrial flutter (HCC)    Hyperlipidemia    Gastroesophageal reflux disease without esophagitis    Chronic combined systolic and diastolic congestive heart failure (HCC)    Paroxysmal atrial fibrillation (HCC)    History of CVA (cerebrovascular accident)    Seizure ([facility] )    Anemia    PAD (peripheral artery disease) ([facility])    CKD (chronic kidney disease) stage 3, GFR 30-59 ml/min    1  B/L Toe Wounds with Cellulitis of the L foot 2/2 to PVD- patient with a history of PVD and recurrent cellulitis  Was scheduled for OP arteriogram for today with Dr Monsivais   -endorses worsening erythema and yellow drainage from wounds  -VSS on presentation with normal WBC   -blood cultures pending   -Vancomycin 1000 mg daily  -Cefepime 1000 mg daily   -F/U blood cultures  -Wound Care consulted  - Will consider switching to PO antibiotics if blood cultures negative  - consult vascular team to see if they want to do arteriogram as an inpatient or whether can be done as an OP     2  Combined Diastolic & Systolic Heart Failure   -ROBB on 2/2/2414 showed systolic function was moderately to markedly reduced  Ejection fraction was estimated to be 35 %   -appears euvolemic on exam  -Continue Furosemide 40 mg daily   -Continue Lisinopril 5 mg     3  CAD   -ASA 81 mg   -Atorvastatin 80 mg  -Metoprolol 12 5 mg     4  Paroxysmal Atrial Fibrillation  -NSR  -rates well controlled  -Amiodarone 200 mg  -Xarelto 20 mg daily      5   Type 2 DM  -Metformin and Lantus at home  -Hold Metformin  -Lantus 20 units q12 hours  -Humalog with meals and daily at bedtime (algorithm 2)     6  GERD  -Pantoprazole 20 mg      7  Microcytic Anemia  -Hgb 9 8 this AM  -Ferrous sulfate      8  Asthma  -Albuterol prn  -Fluticasone 2 puffs q12 hours      9  Seizure Disorder  -Keppra 750 mg q12 hours      10  Hypothyroidism  Levothyroxine 1000 mcg     Disposition: Consult     Subjective:   Patient reports increasing erythema and drainage yesterday from her left toe ulcer  Fevers or chills overnight  No acute events overnight per nursing staff  Vitals: Temp (24hrs), Av °F (36 7 °C), Min:97 8 °F (36 6 °C), Max:98 2 °F (36 8 °C)  Current: Temperature: 98 °F (36 7 °C)  Vitals:    18 1835 18 1923 18 2346 18 0722   BP: 143/67 144/67 132/62 162/72   BP Location: Right arm Right arm Right arm Left arm   Pulse: 57 56 65 58   Resp:    Temp:  98 2 °F (36 8 °C) 98 2 °F (36 8 °C) 98 °F (36 7 °C)   TempSrc:  Oral Oral Oral   SpO2: 93% 93% 92% 93%   Weight:  73 1 kg (161 lb 1 6 oz)     Height:  5' 4" (1 626 m)      Body mass index is 27 65 kg/m²  I/O last 24 hours: In: 200 [IV Piggyback:200]  Out: 750 [Urine:750]      Physical Exam   Constitutional: She is oriented to person, place, and time  She appears well-developed and well-nourished  No distress  HENT:   Head: Normocephalic and atraumatic  Eyes: EOM are normal    Neck: No tracheal deviation present  Cardiovascular: Normal rate and regular rhythm  No murmur heard  Pulmonary/Chest: Effort normal and breath sounds normal  No stridor  She has no wheezes  Abdominal: Soft  She exhibits no distension  There is no tenderness  Musculoskeletal: She exhibits no edema  Neurological: She is alert and oriented to person, place, and time  Skin: Skin is warm and dry  Bilateral great toe ulcers significant eschar  Left foot erythema with slight swelling to the midfoot  Psychiatric: She has a normal mood and affect   Her behavior is normal    Nursing note and vitals reviewed          Invasive Devices     Peripheral Intravenous Line            Peripheral IV 08/08/18 Left Arm less than 1 day                          Labs:   Recent Results (from the past 24 hour(s))   CBC and differential    Collection Time: 08/08/18  4:41 PM   Result Value Ref Range    WBC 10 60 (H) 4 31 - 10 16 Thousand/uL    RBC 4 64 3 81 - 5 12 Million/uL    Hemoglobin 10 8 (L) 11 5 - 15 4 g/dL    Hematocrit 35 2 34 8 - 46 1 %    MCV 76 (L) 82 - 98 fL    MCH 23 3 (L) 26 8 - 34 3 pg    MCHC 30 7 (L) 31 4 - 37 4 g/dL    RDW 18 3 (H) 11 6 - 15 1 %    MPV 10 6 8 9 - 12 7 fL    Platelets 663 613 - 633 Thousands/uL    nRBC 0 /100 WBCs    Neutrophils Relative 72 43 - 75 %    Immat GRANS % 1 0 - 2 %    Lymphocytes Relative 15 14 - 44 %    Monocytes Relative 10 4 - 12 %    Eosinophils Relative 2 0 - 6 %    Basophils Relative 0 0 - 1 %    Neutrophils Absolute 7 71 (H) 1 85 - 7 62 Thousands/µL    Immature Grans Absolute 0 05 0 00 - 0 20 Thousand/uL    Lymphocytes Absolute 1 58 0 60 - 4 47 Thousands/µL    Monocytes Absolute 1 02 0 17 - 1 22 Thousand/µL    Eosinophils Absolute 0 20 0 00 - 0 61 Thousand/µL    Basophils Absolute 0 04 0 00 - 0 10 Thousands/µL   Comprehensive metabolic panel    Collection Time: 08/08/18  4:41 PM   Result Value Ref Range    Sodium 136 136 - 145 mmol/L    Potassium 4 3 3 5 - 5 3 mmol/L    Chloride 101 100 - 108 mmol/L    CO2 26 21 - 32 mmol/L    Anion Gap 9 4 - 13 mmol/L    BUN 23 5 - 25 mg/dL    Creatinine 1 15 0 60 - 1 30 mg/dL    Glucose 135 65 - 140 mg/dL    Calcium 8 8 8 3 - 10 1 mg/dL    AST 22 5 - 45 U/L    ALT 27 12 - 78 U/L    Alkaline Phosphatase 58 46 - 116 U/L    Total Protein 8 3 (H) 6 4 - 8 2 g/dL    Albumin 3 4 (L) 3 5 - 5 0 g/dL    Total Bilirubin 0 55 0 20 - 1 00 mg/dL    eGFR 45 ml/min/1 73sq m   ECG 12 lead    Collection Time: 08/08/18  4:58 PM   Result Value Ref Range    Ventricular Rate 59 BPM    Atrial Rate 59 BPM    OR Interval 322 ms    QRSD Interval 164 ms QT Interval 554 ms    QTC Interval 548 ms    P Rochelle 42 degrees    QRS Axis -64 degrees    T Wave Axis 95 degrees   Fingerstick Glucose (POCT)    Collection Time: 08/08/18  8:29 PM   Result Value Ref Range    POC Glucose 110 65 - 140 mg/dl   Fingerstick Glucose (POCT)    Collection Time: 08/09/18 12:05 AM   Result Value Ref Range    POC Glucose 191 (H) 65 - 140 mg/dl   Protime-INR    Collection Time: 08/09/18 12:06 AM   Result Value Ref Range    Protime 15 4 (H) 11 8 - 14 2 seconds    INR 1 21 (H) 0 86 - 1 68   Basic metabolic panel    Collection Time: 08/09/18  5:50 AM   Result Value Ref Range    Sodium 137 136 - 145 mmol/L    Potassium 4 0 3 5 - 5 3 mmol/L    Chloride 103 100 - 108 mmol/L    CO2 27 21 - 32 mmol/L    Anion Gap 7 4 - 13 mmol/L    BUN 19 5 - 25 mg/dL    Creatinine 1 06 0 60 - 1 30 mg/dL    Glucose 111 65 - 140 mg/dL    Calcium 7 9 (L) 8 3 - 10 1 mg/dL    eGFR 50 ml/min/1 73sq m   CBC and differential    Collection Time: 08/09/18  5:50 AM   Result Value Ref Range    WBC 7 99 4 31 - 10 16 Thousand/uL    RBC 4 22 3 81 - 5 12 Million/uL    Hemoglobin 9 8 (L) 11 5 - 15 4 g/dL    Hematocrit 32 4 (L) 34 8 - 46 1 %    MCV 77 (L) 82 - 98 fL    MCH 23 2 (L) 26 8 - 34 3 pg    MCHC 30 2 (L) 31 4 - 37 4 g/dL    RDW 18 1 (H) 11 6 - 15 1 %    MPV 10 8 8 9 - 12 7 fL    Platelets 621 113 - 294 Thousands/uL    nRBC 0 /100 WBCs    Neutrophils Relative 70 43 - 75 %    Immat GRANS % 0 0 - 2 %    Lymphocytes Relative 14 14 - 44 %    Monocytes Relative 11 4 - 12 %    Eosinophils Relative 4 0 - 6 %    Basophils Relative 1 0 - 1 %    Neutrophils Absolute 5 56 1 85 - 7 62 Thousands/µL    Immature Grans Absolute 0 03 0 00 - 0 20 Thousand/uL    Lymphocytes Absolute 1 14 0 60 - 4 47 Thousands/µL    Monocytes Absolute 0 85 0 17 - 1 22 Thousand/µL    Eosinophils Absolute 0 35 0 00 - 0 61 Thousand/µL    Basophils Absolute 0 06 0 00 - 0 10 Thousands/µL   Fingerstick Glucose (POCT)    Collection Time: 08/09/18  6:12 AM   Result Value Ref Range    POC Glucose 113 65 - 140 mg/dl       Radiology Results: I have personally reviewed pertinent reports  Other Diagnostic Testing:   I have personally reviewed pertinent reports          Active Meds:   Current Facility-Administered Medications   Medication Dose Route Frequency    acetaminophen (TYLENOL) tablet 650 mg  650 mg Oral Q6H PRN    albuterol (PROVENTIL HFA,VENTOLIN HFA) inhaler 1 puff  1 puff Inhalation BID PRN    amiodarone tablet 200 mg  200 mg Oral Daily With Breakfast    amLODIPine (NORVASC) tablet 5 mg  5 mg Oral Daily    aspirin chewable tablet 81 mg  81 mg Oral Daily    atorvastatin (LIPITOR) tablet 80 mg  80 mg Oral Daily    calcium carbonate (TUMS) chewable tablet 1,000 mg  1,000 mg Oral TID    cefepime (MAXIPIME) IVPB (premix) 1,000 mg  1,000 mg Intravenous Q24H    diphenhydrAMINE (BENADRYL) injection 50 mg  50 mg Intravenous Once    docusate sodium (COLACE) capsule 100 mg  100 mg Oral BID PRN    ferrous sulfate tablet 325 mg  325 mg Oral Daily With Breakfast    fluticasone (FLONASE) 50 mcg/act nasal spray 1 spray  1 spray Nasal Daily    fluticasone (FLOVENT HFA) 110 MCG/ACT inhaler 2 puff  2 puff Inhalation Q12H LANCE    furosemide (LASIX) tablet 40 mg  40 mg Oral Daily    insulin glargine (LANTUS) subcutaneous injection 20 Units 0 2 mL  20 Units Subcutaneous Q12H [address]    insulin lispro (HumaLOG) 100 units/mL subcutaneous injection 1-5 Units  1-5 Units Subcutaneous TID AC    insulin lispro (HumaLOG) 100 units/mL subcutaneous injection 1-5 Units  1-5 Units Subcutaneous HS    levETIRAcetam (KEPPRA) tablet 750 mg  750 mg Oral Q12H LANCE    levothyroxine tablet 100 mcg  100 mcg Oral Early Morning    lisinopril (ZESTRIL) tablet 5 mg  5 mg Oral Daily    loratadine (CLARITIN) tablet 10 mg  10 mg Oral Daily    magnesium oxide (MAG-OX) tablet 400 mg  400 mg Oral BID    methylPREDNISolone sodium succinate (Solu-MEDROL) injection 125 mg  125 mg Intravenous Once    metoclopramide (REGLAN) tablet 5 mg  5 mg Oral Q6H PRN    metoprolol tartrate (LOPRESSOR) partial tablet 12 5 mg  12 5 mg Oral Q12H [facility]    nitroglycerin (NITROSTAT) SL tablet 0 4 mg  0 4 mg Sublingual Q3 min PRN    pantoprazole (PROTONIX) EC tablet 20 mg  20 mg Oral Early Morning    polyethylene glycol (MIRALAX) packet 17 g  17 g Oral Daily PRN    rivaroxaban (XARELTO) tablet 20 mg  20 mg Oral Daily    sodium chloride 0 9 % infusion  75 mL/hr Intravenous Continuous    vancomycin (VANCOCIN) IVPB (premix) 1,000 mg  12 5 mg/kg Intravenous Q12H         VTE Pharmacologic Prophylaxis: Reason for no pharmacologic prophylaxis eliquis  VTE Mechanical Prophylaxis: sequential compression device    Rae Jackson, DO

## 2018-08-09 NOTE — SOCIAL WORK
CM met with patient and explained cm role  Pt alert and oriented  Pt reports she lives in a 2 story home w/her son Sg Eisenberg [phone], and his wife Anna Santamaria [phone] w/4 second floor bedroom, w/8 madhuri  Pt reports DME: rw, prev VNA w/[facility], prev rehab w/[facility]  Pt reports being independent PTA, reports good support at home from family and friends, she does not drive, transports to appointment via family, and will transport home w//family for d/c  Pts pharmacy is [facility]  Pt denies hx/admission for drug/etoh and psych/mental health  CM reviewed d/c planning process including the following: identifying help at home, patient preference for d/c planning needs, Discharge Lounge, [facility] Meds to Bed program, availability of treatment team to discuss questions or concerns patient and/or family may have regarding understanding medications and recognizing signs and symptoms once discharged  CM also encouraged patient to follow up with all recommended appointments after discharge  Patient advised of importance for patient and family to participate in managing patients medical well being

## 2018-08-09 NOTE — SOCIAL WORK
Initial interview and DC Dash:     CM met with the patient to review the CM role and discuss possible dc needs  Pt lives alone in a 2 story home in [facility]  No JUSTINO  Pt has 1st floor bedroom/bathroom  Pt uses a RW; she has a RW, commode, SPC, quad can and a shower chair  Hx of [facility] 6/2018 for RN, PT services  Pt was at  rehab in April & May 2018  Pt denied drug, etoh or mental illness history  Pt stated that her Son Rose Mary Thacker is her POA; CM requested copy of POA document  Prescriptions are filled at [facility] in [place]  Main contact: Son / Anastasiia Almazan [phone]  Peter Lazoman [phone]  HUBERT Drake Rai [address] [phone]    Admit Dx Bilat Toe wounds with Cellulitis  Hx PVD, CHF, CAD, DM, Seizures in June 2018  Pt expressed understanding of her diagnoses and treatment regimens  Pt denied any dc needs  CM will follow medical progression and re-assess needs prior to dc  CM reviewed d/c planning process including the following: identifying help at home, patient preference for d/c planning needs, Discharge Lounge, [facility] Meds to Bed program, availability of treatment team to discuss questions or concerns patient and/or family may have regarding understanding medications and recognizing signs and symptoms once discharged  CM also encouraged patient to follow up with all recommended appointments after discharge  Patient advised of importance for patient and family to participate in managing patients medical well being

## 2018-08-09 NOTE — ASSESSMENT & PLAN NOTE
Creatinine Stable 1 06    Plan:  --Monitor post Agram  --Renal prep for Agram tomorrow: IVF/Mucomyst  --Hold Lasix & Lisinopril

## 2018-08-09 NOTE — H&P
INTERNAL MEDICINE HISTORY AND PHYSICAL  [id number] SOD Team C     NAME: Emerald Bryant  AGE: [de-identified] y o  SEX: female  : 1940   MRN: [id number]  ENCOUNTER: [id number]    DATE: 2018  TIME: 10:25 PM    Primary Care Physician: Ananya Cline MD  Admitting Provider: Paul Galvan MD    Chief complaint: LLE Wounds     History of Present Illness     Emerald Bryant is a [de-identified] y o  female with a PMHx DVT/PE on Xarelto, hypertension, type 2 diabetes mellitus with complications, paroxysmal atrial fibrillation, peripheral artery disease, cerebrovascular accident, CHF, GERD, hyperlipidemia who presents with bilateral foot and toe wounds  Patient states that she woke up this morning and noticed increased erythema and pain of her left foot  She also endorsed fevers/chills at this time  She noticed yellow discharge coming from the wounds as well  She then presented to her PCP office and was instructed to present to the emergency department  She was scheduled for lower extremity angiography on 2018 which was canceled due to her admission to the hospital      In the ED, she was given Vancomycin IVPB, and blood cultures were drawn  Her vital signs were stable upon presentation  Initial WBC was 10 6  The patient is still endorsing chills  She denies chest pain, SOB, nausea, vomiting, diarrhea  Review of Systems   Review of Systems   Constitutional: Positive for chills  HENT: Negative  Eyes: Negative  Respiratory: Negative  Cardiovascular: Negative  Gastrointestinal: Negative  Endocrine: Negative  Genitourinary: Negative  Musculoskeletal: Negative  Skin: Negative  Allergic/Immunologic: Negative  Neurological: Negative  Hematological: Negative  Psychiatric/Behavioral: Negative          Past Medical History     Past Medical History:   Diagnosis Date    Altered mental status     Altered mental status     Anemia     Anticoagulated     Coumadin for Aflutter    Asthma     Atrial flutter ([facility] [facility]     maintained on coumadin anticoag    Bradycardia     CAD (coronary artery disease)     Candidiasis     Carotid stenosis, bilateral     Cataract     Chest pain     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) ([facility])     Chronic fatigue syndrome     Chronic low back pain     Chronic ulcer of toes ([facility])     left and right    Concussion w loss of consciousness of unsp duration, init     Coronary artery disease     CVA (cerebral vascular accident) ([facility] [address] ) 4/17/2018    Diabetes mellitus [facility]     type 2, insulin dependent    DJD (degenerative joint disease)     Expressive aphasia     Foot pain     GERD (gastroesophageal reflux disease)     Herpes zoster     HLD (hyperlipidemia)     HTN (hypertension)     Hyperlipidemia     Hypothyroidism     Irritable bowel syndrome     Kidney stone     Lumbar radiculopathy     Lyme disease     Dx in hospital 8/2011    Lyme disease     MI (myocardial infarction) ([facility] [address] [facility]     Migraines     Muscle spasm     Non-neoplastic nevus     Nontoxic multinodular goiter     NSVT (nonsustained ventricular tachycardia) ([facility])     OA (osteoarthritis)     Osteoarthritis     Other chronic pain     PAD (peripheral artery disease) ([facility])     Palpitations     Paroxysmal atrial fibrillation ([facility])     Pseudogout     Pulmonary hypertension ([facility])     S/P TAVR (transcatheter aortic valve replacement)     Seizures ([facility])     Severe sepsis ([facility] [facility]     Spinal stenosis     Stroke [facility] 05/2018    Transient cerebral ischemia     Trigger ring finger     Viral gastroenteritis        Past Surgical History     Past Surgical History:   Procedure Laterality Date    APPENDECTOMY      ARTERIOGRAM  12/19/2017    CARDIAC CATHETERIZATION      CHOLECYSTECTOMY      COLONOSCOPY      CORONARY ARTERY BYPASS GRAFT  2004    LUMBAR LAMINECTOMY      DC ECHO TRANSESOPHAG R-T 2D W/PRB IMG ACQUISJ I&R N/A 4/3/2018    Procedure: TRANSESOPHAGEAL ECHOCARDIOGRAM (ROBB); Surgeon: Gretchen Daily DO;  [facility];  Service: Cardiac Surgery    NC REPLACE AORTIC VALVE OPENFEMORAL ARTERY APPROACH N/A 4/3/2018    Procedure: REPLACEMENT AORTIC VALVE TRANSCATHETER (TAVR) TRANSFEMORAL w/ 26MM IVY YEVGENIY S3 VALVE;  Surgeon: Gretchen Daily DO;  [facility];  Service: Cardiac Surgery    THYROIDECTOMY      TOTAL ABDOMINAL HYSTERECTOMY W/ BILATERAL SALPINGOOPHORECTOMY         Social History     History   Alcohol Use No     History   Drug Use No     History   Smoking Status    Never Smoker   Smokeless Tobacco    Never Used       Family History     Family History   Problem Relation Age of Onset    Cancer Mother     Stroke Mother     Cancer Father     Heart disease Father     Breast cancer Sister     Diabetes Daughter         Passed away January 2017        Medications Prior to Admission     Prior to Admission medications    Medication Sig Start Date End Date Taking?  Authorizing Provider   amiodarone 200 mg tablet Take 1 tablet (200 mg total) by mouth 2 (two) times a day 6/19/18  Yes Harvey Brown MD   amLODIPine (NORVASC) 5 mg tablet Take 1 tablet (5 mg total) by mouth daily 7/11/18  Yes Arvin Caballero MD   aspirin 81 MG tablet Take 81 mg by mouth daily   Yes Historical Provider, MD   atorvastatin (LIPITOR) 80 mg tablet Take 1 tablet (80 mg total) by mouth daily 4/26/18  Yes Rosita Singh MD   Cholecalciferol (VITAMIN D3) 1000 units CHEW Chew 1 tablet (1,000 Units total) 2 (two) times a day 8/6/18  Yes Stephany Pillai PA-C   esomeprazole (NEXIUM) 20 mg capsule Take 20 mg by mouth every morning before breakfast   Yes Historical Provider, MD   ferrous sulfate 325 (65 Fe) mg tablet Take 1 tablet (325 mg total) by mouth daily with breakfast 6/15/18  Yes RAMO Ling   fluticasone (FLONASE) 50 mcg/act nasal spray 1 spray into each nostril daily   Yes Historical Provider, MD   furosemide (LASIX) 40 mg tablet Take 1 tablet (40 mg total) by mouth daily 5/30/18  Yes Tor Sequeira MD   glucose blood test strip 1 each by Other route 4 (four) times a day (before meals and at bedtime) Use as instructed   Yes Historical Provider, MD   Insulin Syringe-Needle U-100 (BD INSULIN SYRINGE ULTRAFINE) 31G X 5/16" 1 ML MISC Inject under the skin 2 (two) times a day 6/27/18  Yes RAMO Ramachandran   LANTUS 100 UNIT/ML subcutaneous injection Inject 20 Units under the skin 2 (two) times a day 4/19/18  Yes Greg Waldrop   levETIRAcetam (KEPPRA) 750 mg tablet Take 1 tablet (750 mg total) by mouth every 12 (twelve) hours 6/13/18  Yes Jose Alberto Rendon MD   levothyroxine 100 mcg tablet Take 1 tablet (100 mcg total) by mouth daily 6/4/18  Yes RAMO Ling   lisinopril (ZESTRIL) 5 mg tablet Take 1 tablet (5 mg total) by mouth daily 5/30/18  Yes Tor Sequeira MD   loratadine (CLARITIN) 10 mg tablet Take 1 tablet (10 mg total) by mouth daily 6/4/18  Yes RAMO Ling   magnesium oxide (MAG-OX) 400 mg Take 1 tablet (400 mg total) by mouth 2 (two) times a day 6/15/18  Yes RAMO Ling   metFORMIN (GLUCOPHAGE) 1000 MG tablet 1,000 mg 2 (two) times a day   Yes Historical Provider, MD   metoclopramide (REGLAN) 10 mg tablet Take 0 5 tablets (5 mg total) by mouth every 6 (six) hours as needed (Nausea and vomiting) 5/29/18  Yes Lina Ellington DO   metoprolol tartrate (LOPRESSOR) 25 mg tablet Take 0 5 tablets (12 5 mg total) by mouth every 12 (twelve) hours 6/21/18  Yes Juanjo Santos MD   Mometasone Furoate [facility]) 100 MCG/ACT AERO Inhale 2 puffs once daily 3/12/18  Yes Jed Lundborg, MD   pantoprazole (PROTONIX) 40 mg tablet Take 40 mg by mouth daily   Yes Historical Provider, MD   potassium chloride (K-DUR,KLOR-CON) 10 mEq tablet Take 1 tablet (10 mEq total) by mouth 2 (two) times a day 7/19/18  Yes RAMO Ling   rivaroxaban (XARELTO) 20 mg tablet Take 1 tablet (20 mg total) by mouth daily 6/4/18  Yes RAMO Lepe   acetaminophen (TYLENOL) 650 mg CR tablet Take 1 tablet (650 mg total) by mouth every 8 (eight) hours as needed for mild pain Do not take in conjunction with Percocet   4/6/18   Amando Hernandez PA-C   albuterol (VENTOLIN HFA) 90 mcg/act inhaler Inhale 108 mcg 2 (two) times a day as needed 2 puffs bid PRN    Historical Provider, MD   calcium carbonate (TUMS) 500 mg chewable tablet Chew 2 tablets 3 (three) times a day      Historical Provider, MD   cholecalciferol (VITAMIN D3) 1,000 units tablet Take 1,000 Units by mouth 2 (two) times a day   10/20/16   Historical Provider, MD   diphenhydrAMINE (BENADRYL) 50 mg capsule Take 1 capsule (50 mg total) by mouth once for 1 dose Take 1 hour prior to angiogram procedure for contrast allergy 7/11/18 8/8/18  Mehdi Monsivais MD   methylPREDNISolone (MEDROL) 32 MG tablet Take 1 tablet (32 mg total) by mouth daily 7/11/18   Mehdi Monsivais MD   nitroglycerin (NITROSTAT) 0 4 mg SL tablet Place 0 4 mg under the tongue every 3 (three) minutes as needed 5/11/15   Historical Provider, MD   phenazopyridine (PYRIDIUM) 200 mg tablet Take 1 tablet (200 mg total) by mouth 3 (three) times a day with meals 6/12/18   RAMO Lepe   polyethylene glycol (MIRALAX) 17 g packet Take 17 g by mouth daily as needed      Historical Provider, MD       Allergies     Allergies   Allergen Reactions    Other Chest Pain     IVP-listed as "chest pain" in previous chart, patient stated this occurred "a long time ago" but does not remember exactly occurred     Cephalosporins Chest Pain    Contrast [Iodinated Diagnostic Agents] Other (See Comments)     Flash pulm edema    Doxycycline Chest Pain    Ketorolac Other (See Comments)     Chest pain     Levaquin [Levofloxacin] Chest Pain    Ondansetron Chest Pain     Prolonged QT    Toradol [Ketorolac Tromethamine] Chest Pain       Objective     Vitals:    08/08/18 1735 08/08/18 1805 08/08/18 1835 08/08/18 1923   BP: 140/63 149/70 143/67 144/67   BP Location: Right arm Right arm Right arm Right arm   Pulse: 58 55 57 56   Resp: 22 22 22 20   Temp:    98 2 °F (36 8 °C)   TempSrc:    Oral   SpO2: 94% 94% 93% 93%   Weight:    73 1 kg (161 lb 1 6 oz)   Height:    5' 4" (1 626 m)     Body mass index is 27 65 kg/m²  Intake/Output Summary (Last 24 hours) at 08/08/18 2225  Last data filed at 08/08/18 1810   Gross per 24 hour   Intake              200 ml   Output                0 ml   Net              200 ml     Invasive Devices     Peripheral Intravenous Line            Peripheral IV 08/08/18 Left Arm less than 1 day                Physical Exam  GENERAL: Appears well-developed and well-nourished  Appears in no acute distress   HEENT: Normocephalic and atraumatic  No scleral icterus  PERRLA  EOMI B/L  No oropharyngeal edema  MM moist    NECK: Neck supple with no lymphadenopathy  Trachea midline  No JVD  CARDIOVASCULAR: S1 and S2 are present  Regular rate and rhythm  No murmurs, rubs, or gallops  RESPIRATORY: Crackles auscultated in R lung fields   ABDOMINAL: Bowel sounds present in all 4 quadrants, non-tender, soft, non-distended  No organomegaly, rebound, or guarding  EXTREMITIES: 2+ DP and PT pulses bilaterally; no cyanosis, clubbing, edema  ROM intact  DOWNEY x4  Wounds on b/l toes with erythematous extension on L lower leg   MUSCULOSKELETAL: No joint tenderness, deformity or swelling, full range of motion without pain  NEUROLOGIC: Patient is alert and oriented to person, place, and time  No sensory or motor deficits  CN 2-12 intact  Plantars downgoing bilaterally  Speech fluent  SKIN: Skin is warm and dry  No skin lesions are present  No rashes  PSYCHIATRIC: Normal mood and affect     Lab Results: I have personally reviewed pertinent reports      CBC:   Results from last 7 days  Lab Units 08/08/18  1641   WBC Thousand/uL 10 60*   RBC Million/uL 4 64   HEMOGLOBIN g/dL 10 8*   HEMATOCRIT % 35 2   MCV fL 76*   MCH pg 23 3* MCHC g/dL 30 7*   RDW % 18 3*   MPV fL 10 6   PLATELETS Thousands/uL 242   NRBC AUTO /100 WBCs 0   NEUTROS PCT % 72   LYMPHS PCT % 15   MONOS PCT % 10   EOS PCT % 2   BASOS PCT % 0   NEUTROS ABS Thousands/µL 7 71*   LYMPHS ABS Thousands/µL 1 58   MONOS ABS Thousand/µL 1 02   EOS ABS Thousand/µL 0 20   , Chemistry Profile:   Results from last 7 days  Lab Units 08/08/18  1641   SODIUM mmol/L 136   POTASSIUM mmol/L 4 3   CHLORIDE mmol/L 101   CO2 mmol/L 26   ANION GAP mmol/L 9   BUN mg/dL 23   CREATININE mg/dL 1 15   GLUCOSE RANDOM mg/dL 135   CALCIUM mg/dL 8 8   AST U/L 22   ALT U/L 27   ALK PHOS U/L 58   TOTAL PROTEIN g/dL 8 3*   BILIRUBIN TOTAL mg/dL 0 55   EGFR ml/min/1 73sq m 45   , Coagulation Studies:   , Cardiac Studies:   , Additional Labs:   , iSTAT CHEM 8:   Results from last 7 days  Lab Units 08/08/18  1641   EGFR ml/min/1 73sq m 45   GLUCOSE RANDOM mg/dL 135   HEMOGLOBIN g/dL 10 8*   , ABG:   , Toxicology:   , Last A1C/Lipid Panel/Thyroid Panel:   Lab Results   Component Value Date    HGBA1C 7 7 (H) 04/18/2018    HGBA1C 7 5 (H) 03/17/2018    TRIG 99 04/18/2018    TRIG 210 (H) 07/14/2017    CHOL 101 04/18/2018    CHOL 147 07/14/2017    HDL 35 (L) 04/18/2018    HDL 42 07/14/2017    LDLCALC 46 04/18/2018    LDLCALC 63 07/14/2017    BAQ9KBYLTZBL 1 520 06/19/2018       Imaging: I have personally reviewed pertinent films in PACS  Xr Chest 2 Views    Result Date: 8/8/2018  Narrative: CHEST INDICATION:   Shortness of breath  COMPARISON:  6/19/2018, report CTA chest, abdomen and pelvis 3/15/2018 EXAM PERFORMED/VIEWS:  XR CHEST FRONTAL & LATERAL FINDINGS: Heart shadow is enlarged but unchanged from prior exam   Status post median sternotomy with TAVR and coarse mitral valve annulus calcification suggested  The lungs are clear  No pneumothorax or pleural effusion  Degenerative changes of the spine  Impression: Stable cardiomegaly  No acute cardiopulmonary disease   Workstation performed: [id number] Microbiology: Blood cultures pending     Urinalysis:       Invalid input(s): URIBILINOGEN     Urine Micro:        EKG, Pathology, and Other Studies: I have personally reviewed pertinent reports  Medications given in Emergency Department     Medication Administration - last 24 hours from 08/07/2018 2225 to 08/08/2018 2225       Date/Time Order Dose Route Action Action by     08/08/2018 1810 vancomycin (VANCOCIN) IVPB (premix) 1,000 mg 0 mg/kg Intravenous Stopped Sulaiman Carbajal RN     08/08/2018 1710 vancomycin (VANCOCIN) IVPB (premix) 1,000 mg 1,000 mg Intravenous [address] Sulaiman Carbajal RN     08/08/2018 1707 acetaminophen (TYLENOL) tablet 975 mg 975 mg Oral Given Sulaiman Carbajal RN          Assessment and Plan       1  B/L Toe Wounds with Cellulitis  -patient with a history of PVD and recurrent cellulitis  -endorses worsening erythema and yellow drainage from wounds as well as subjective fevers/chills  -VSS on presentation with normal WBC   -blood cultures pending   -Vancomycin started in the ED    -Vancomycin 1000 mg daily  -Cefepime 1000 mg daily   -F/U blood cultures  -Wound Care consulted     2  Combined Diastolic & Systolic Heart Failure   -ROBB on 7/7/9082 showed systolic function was moderately to markedly reduced  Ejection fraction was estimated to be 35 %   -appears euvolemic on exam    -Continue Furosemide 40 mg daily   -Continue Lisinopril 5 mg    3  CAD   -ASA 81 mg   -Atorvastatin 80 mg  -Metoprolol 12 5 mg    4  Paroxysmal Atrial Fibrillation  -NSR  -rates well controlled    -Amiodarone 200 mg  -Xarelto 20 mg daily     5  Type 2 DM  -Metformin and Lantus at home    -Hold Metformin  -Lantus 20 units q12 hours  -Humalog with meals and daily at bedtime (algorithm 2)    6  GERD  -Pantoprazole 20 mg     7  Microcytic Anemia  -Hgb 10 8    -Ferrous sulfate     8  Asthma  -Albuterol prn  -Fluticasone 2 puffs q12 hours     9  Seizure Disorder  -Keppra 750 mg q12 hours     10  Hypothyroidism  Levothyroxine 1000 mcg       Code Status: Level 1 - Full Code  VTE Pharmacologic Prophylaxis: Reason for no pharmacologic prophylaxis Xarelto   VTE Mechanical Prophylaxis: sequential compression device  Admission Status: OBSERVATION    Admission Time  I spent 30 minutes admitting the patient  This involved direct patient contact where I performed a full history and physical, reviewing previous records, and reviewing laboratory and other diagnostic studies      Екатерина Parrish, DO  Internal Medicine  PGY-1

## 2018-08-09 NOTE — CASE MANAGEMENT
Continued Stay Review  OBSERVATION 08/08/2018 @ 1800, CONVERTED TO INPATIENT ADMISSION 08/09/2018 @ 1200, DUE TO FURTHER DIAGNOSTIC WORKUP, cellulitis with IV Abx, REQUIRING AT LEAST 2 MIDNIGHT STAY  Date: 08/09/2018 08/09/18 1200  Inpatient Admission Once     Transfer Service: General Medicine       Question Answer Comment   Admitting Physician Katie Pacheco    Level of Care Med Surg    Estimated length of stay More than 2 Midnights    Certification I certify that inpatient services are medically necessary for this patient for a duration of greater than two midnights  See H&P and MD Progress Notes for additional information about the patient's course of treatment                Vital Signs: /72 (BP Location: Left arm)   Pulse 58   Temp 98 °F (36 7 °C) (Oral)   Resp 18   Ht 5' 4" (1 626 m)   Wt 73 1 kg (161 lb 1 6 oz)   LMP  (LMP Unknown)   SpO2 93%   BMI 27 65 kg/m²     Medications:   Scheduled Meds:   Current Facility-Administered Medications:  acetaminophen 650 mg Oral Q6H PRN Rowe November, DO    albuterol 1 puff Inhalation BID PRN Rowe November, DO    amiodarone 200 mg Oral Daily With Breakfast Rowe November, DO    amLODIPine 5 mg Oral Daily Rowe November, DO    aspirin 81 mg Oral Daily Rowe November, DO    atorvastatin 80 mg Oral Daily Rowe November, DO    calcium carbonate 1,000 mg Oral TID Rowe November, DO    cefepime 1,000 mg Intravenous Q24H Zelda Douglas MD Last Rate: 1,000 mg (08/09/18 0130)   docusate sodium 100 mg Oral BID PRN Rowe November, DO    ferrous sulfate 325 mg Oral Daily With Breakfast Rowe November, DO    fluticasone 1 spray Nasal Daily Rowe November, DO    fluticasone 2 puff Inhalation Q12H [facility] Rowe November, DO    furosemide 40 mg Oral Daily Rowe November, DO    insulin glargine 20 Units Subcutaneous Q12H [facility] Todd Moy, DO    insulin lispro 1-5 Units Subcutaneous TID AC Todd Moy, DO    insulin lispro 1-5 Units Subcutaneous HS Rowe November, DO levETIRAcetam 750 mg Oral Q12H [address] Larna Lie, DO    levothyroxine 100 mcg Oral Early Morning Larna Lie, DO    lisinopril 5 mg Oral Daily Larna Lie, DO    loratadine 10 mg Oral Daily Larna Lie, DO    magnesium oxide 400 mg Oral BID Larna Lie, DO    metoclopramide 5 mg Oral Q6H PRN Larna Lie, DO    metoprolol tartrate 12 5 mg Oral Q12H [address] Larna Lie, DO    nitroglycerin 0 4 mg Sublingual Q3 min PRN Larna Lie, DO    pantoprazole 20 mg Oral Early Morning Larna Lie, DO    polyethylene glycol 17 g Oral Daily PRN Larna Lie, DO    rivaroxaban 20 mg Oral Daily Larna Lie, DO    sodium chloride 75 mL/hr Intravenous Continuous Payton Ander Monsivais MD Last Rate: 75 mL/hr (08/08/18 2130)   vancomycin 12 5 mg/kg Intravenous Q12H Larna Lie, DO Last Rate: 1,000 mg (08/09/18 0612)     Continuous Infusions:   sodium chloride 75 mL/hr Last Rate: 75 mL/hr (08/08/18 2130)       Abnormal Labs/Diagnostic Results:     Age/Sex: [de-identified] y o  female     Assessment/Plan:   Principal Problem:    Cellulitis  Active Problems:    Essential hypertension    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 native heart without angina pectoris    Type 2 diabetes mellitus with complication, with long-term current use of insulin ([facility])    Atrial flutter (HCC)    Hyperlipidemia    Gastroesophageal reflux disease without esophagitis    Chronic combined systolic and diastolic congestive heart failure ([facility])    Paroxysmal atrial fibrillation ([facility])    History of CVA (cerebrovascular accident)    Seizure ([facility] [address] )    Anemia    PAD (peripheral artery disease) ([facility])    CKD (chronic kidney disease) stage 3, GFR 30-59 ml/min     1  B/L Toe Wounds with Cellulitis of the L foot 2/2 to PVD- patient with a history of PVD and recurrent cellulitis   Was scheduled for OP arteriogram for today with Dr Monsivais   -endorses worsening erythema and yellow drainage from wounds  -VSS on presentation with normal WBC   -blood cultures pending   -Vancomycin 1000 mg daily  -Cefepime 1000 mg daily   -F/U blood cultures  -Wound Care consulted  - Will consider switching to PO antibiotics if blood cultures negative  - consult vascular team to see if they want to do arteriogram as an inpatient or whether can be done as an OP     2  Combined Diastolic & Systolic Heart Failure   -ROBB on 0/3/3525 showed systolic function was moderately to markedly reduced  Ejection fraction was estimated to be 35 %   -appears euvolemic on exam  -Continue Furosemide 40 mg daily   -Continue Lisinopril 5 mg     3  CAD   -ASA 81 mg   -Atorvastatin 80 mg  -Metoprolol 12 5 mg     4  Paroxysmal Atrial Fibrillation  -NSR  -rates well controlled  -Amiodarone 200 mg  -Xarelto 20 mg daily      5  Type 2 DM  -Metformin and Lantus at home  -Hold Metformin  -Lantus 20 units q12 hours  -Humalog with meals and daily at bedtime (algorithm 2)     6  GERD  -Pantoprazole 20 mg      7  Microcytic Anemia  -Hgb 9 8 this AM  -Ferrous sulfate      8  Asthma  -Albuterol prn  -Fluticasone 2 puffs q12 hours      9  Seizure Disorder  -Keppra 750 mg q12 hours      10   Hypothyroidism  Levothyroxine 1000 mcg      Discharge Plan: TBD

## 2018-08-09 NOTE — PROGRESS NOTES
[facility] Senior Admission Note   SOD Team Leatha Mathis           Toshia Walker [de-identified] y o  female [id number]       Patient seen and examined  Reviewed H&P per Dr Elder Perry  Agree with the assessment and plan except otherwise stated  Óscar Arenas is an 40-year-old female past medical history of peripheral vascular disease, type 2 diabetes on insulin, coronary artery disease, a flutter, GERD, combined systolic-diastolic heart failure presented for evaluation of nonhealing wounds in the left foot and cellulitis of the left lower extremity  Assessment/Plan: Principal Problem:    Cellulitis  Active Problems:    Essential hypertension    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 native heart without angina pectoris    Type 2 diabetes mellitus with complication, with long-term current use of insulin (HCC)    Atrial flutter (HCC)    Hyperlipidemia    Gastroesophageal reflux disease without esophagitis    Chronic combined systolic and diastolic congestive heart failure (HCC)    Paroxysmal atrial fibrillation (HCC)    PAD (peripheral artery disease) ([facility])       Cellulitis of the left lower extremity  · Likely secondary to patient having severe peripheral vascular disease improper wound care in her lower extremities  Initial white count on admission was 10 6K  · Patient is currently afebrile, nontoxic appearing hemodynamically stable  · Will admit under observation for cellulitis  · Continue vancomycin and cefepime (listed allergic reaction to cefepime patient remotely has a history of "chest pain" - however doubt this is series allergic reaction  - monitor closely )  No concerns for any anaerobic organisms at this time    · BMP, CBC with diff pending tomorrow morning  · Blood cultures x2 pending  · Inpatient consult wound care-for lower extremity ulcers  · Tylenol for pain fevers  · Monitor white blood cell and fever curve          Disposition:  OBSERVATION    Expected LOS: <2 [address], M D   Internal Medicine PGY-2  8/9/2018 12:19 AM

## 2018-08-10 ENCOUNTER — APPOINTMENT (INPATIENT)
Dept: RADIOLOGY | Facility: HOSPITAL | Age: 78
DRG: 252 | End: 2018-08-10
Attending: SURGERY
Payer: MEDICARE

## 2018-08-10 LAB
ANION GAP SERPL CALCULATED.3IONS-SCNC: 7 MMOL/L (ref 4–13)
BUN SERPL-MCNC: 19 MG/DL (ref 5–25)
CALCIUM SERPL-MCNC: 8.3 MG/DL (ref 8.3–10.1)
CHLORIDE SERPL-SCNC: 102 MMOL/L (ref 100–108)
CO2 SERPL-SCNC: 27 MMOL/L (ref 21–32)
CREAT SERPL-MCNC: 0.94 MG/DL (ref 0.6–1.3)
ERYTHROCYTE [DISTWIDTH] IN BLOOD BY AUTOMATED COUNT: 17.6 % (ref 11.6–15.1)
GFR SERPL CREATININE-BSD FRML MDRD: 57 ML/MIN/1.73SQ M
GLUCOSE SERPL-MCNC: 209 MG/DL (ref 65–140)
GLUCOSE SERPL-MCNC: 222 MG/DL (ref 65–140)
GLUCOSE SERPL-MCNC: 306 MG/DL (ref 65–140)
GLUCOSE SERPL-MCNC: 315 MG/DL (ref 65–140)
GLUCOSE SERPL-MCNC: 410 MG/DL (ref 65–140)
HCT VFR BLD AUTO: 31.7 % (ref 34.8–46.1)
HGB BLD-MCNC: 9.9 G/DL (ref 11.5–15.4)
INR PPP: 1.2 (ref 0.86–1.17)
MCH RBC QN AUTO: 23.9 PG (ref 26.8–34.3)
MCHC RBC AUTO-ENTMCNC: 31.2 G/DL (ref 31.4–37.4)
MCV RBC AUTO: 76 FL (ref 82–98)
PLATELET # BLD AUTO: 226 THOUSANDS/UL (ref 149–390)
PMV BLD AUTO: 11.1 FL (ref 8.9–12.7)
POTASSIUM SERPL-SCNC: 4.1 MMOL/L (ref 3.5–5.3)
PROTHROMBIN TIME: 15.3 SECONDS (ref 11.8–14.2)
RBC # BLD AUTO: 4.15 MILLION/UL (ref 3.81–5.12)
SODIUM SERPL-SCNC: 136 MMOL/L (ref 136–145)
WBC # BLD AUTO: 10.24 THOUSAND/UL (ref 4.31–10.16)

## 2018-08-10 PROCEDURE — C1769 GUIDE WIRE: HCPCS

## 2018-08-10 PROCEDURE — 75820 VEIN X-RAY ARM/LEG: CPT

## 2018-08-10 PROCEDURE — C1760 CLOSURE DEV, VASC: HCPCS

## 2018-08-10 PROCEDURE — 85027 COMPLETE CBC AUTOMATED: CPT | Performed by: INTERNAL MEDICINE

## 2018-08-10 PROCEDURE — 37228 HB TIB/PER REVASC W/TLA: CPT

## 2018-08-10 PROCEDURE — C1894 INTRO/SHEATH, NON-LASER: HCPCS

## 2018-08-10 PROCEDURE — 047T3ZZ DILATION OF RIGHT PERONEAL ARTERY, PERCUTANEOUS APPROACH: ICD-10-PCS | Performed by: RADIOLOGY

## 2018-08-10 PROCEDURE — C1725 CATH, TRANSLUMIN NON-LASER: HCPCS

## 2018-08-10 PROCEDURE — 99153 MOD SED SAME PHYS/QHP EA: CPT

## 2018-08-10 PROCEDURE — 047Y3ZZ DILATION OF LOWER ARTERY, PERCUTANEOUS APPROACH: ICD-10-PCS | Performed by: RADIOLOGY

## 2018-08-10 PROCEDURE — 99152 MOD SED SAME PHYS/QHP 5/>YRS: CPT

## 2018-08-10 PROCEDURE — 99233 SBSQ HOSP IP/OBS HIGH 50: CPT | Performed by: INTERNAL MEDICINE

## 2018-08-10 PROCEDURE — 37224 HB FEM/POPL REVAS W/TLA: CPT

## 2018-08-10 PROCEDURE — B41GYZZ FLUOROSCOPY OF LEFT LOWER EXTREMITY ARTERIES USING OTHER CONTRAST: ICD-10-PCS | Performed by: RADIOLOGY

## 2018-08-10 PROCEDURE — C1887 CATHETER, GUIDING: HCPCS

## 2018-08-10 PROCEDURE — 85610 PROTHROMBIN TIME: CPT | Performed by: PHYSICIAN ASSISTANT

## 2018-08-10 PROCEDURE — 80048 BASIC METABOLIC PNL TOTAL CA: CPT | Performed by: INTERNAL MEDICINE

## 2018-08-10 PROCEDURE — 82948 REAGENT STRIP/BLOOD GLUCOSE: CPT

## 2018-08-10 RX ORDER — CEPHALEXIN 500 MG/1
500 CAPSULE ORAL EVERY 6 HOURS SCHEDULED
Qty: 20 CAPSULE | Refills: 0 | Status: SHIPPED | OUTPATIENT
Start: 2018-08-10 | End: 2018-08-24 | Stop reason: HOSPADM

## 2018-08-10 RX ORDER — HYDROCODONE BITARTRATE AND ACETAMINOPHEN 5; 325 MG/1; MG/1
1 TABLET ORAL EVERY 6 HOURS PRN
Qty: 18 TABLET | Refills: 0
Start: 2018-08-10 | End: 2018-08-24 | Stop reason: HOSPADM

## 2018-08-10 RX ORDER — MIDAZOLAM HYDROCHLORIDE 1 MG/ML
INJECTION INTRAMUSCULAR; INTRAVENOUS CODE/TRAUMA/SEDATION MEDICATION
Status: COMPLETED | OUTPATIENT
Start: 2018-08-10 | End: 2018-08-10

## 2018-08-10 RX ORDER — HYDROCODONE BITARTRATE AND ACETAMINOPHEN 5; 325 MG/1; MG/1
1 TABLET ORAL EVERY 6 HOURS PRN
Status: DISCONTINUED | OUTPATIENT
Start: 2018-08-10 | End: 2018-08-12

## 2018-08-10 RX ORDER — FENTANYL CITRATE 50 UG/ML
INJECTION, SOLUTION INTRAMUSCULAR; INTRAVENOUS CODE/TRAUMA/SEDATION MEDICATION
Status: COMPLETED | OUTPATIENT
Start: 2018-08-10 | End: 2018-08-10

## 2018-08-10 RX ORDER — HEPARIN SODIUM 1000 [USP'U]/ML
INJECTION, SOLUTION INTRAVENOUS; SUBCUTANEOUS CODE/TRAUMA/SEDATION MEDICATION
Status: COMPLETED | OUTPATIENT
Start: 2018-08-10 | End: 2018-08-10

## 2018-08-10 RX ADMIN — FENTANYL CITRATE 25 MCG: 50 INJECTION, SOLUTION INTRAMUSCULAR; INTRAVENOUS at 15:14

## 2018-08-10 RX ADMIN — MIDAZOLAM 0.5 MG: 1 INJECTION INTRAMUSCULAR; INTRAVENOUS at 14:37

## 2018-08-10 RX ADMIN — LEVETIRACETAM 750 MG: 750 TABLET ORAL at 09:13

## 2018-08-10 RX ADMIN — CEFAZOLIN SODIUM 1000 MG: 1 SOLUTION INTRAVENOUS at 06:44

## 2018-08-10 RX ADMIN — METOPROLOL TARTRATE 12.5 MG: 25 TABLET ORAL at 09:13

## 2018-08-10 RX ADMIN — Medication 400 MG: at 09:14

## 2018-08-10 RX ADMIN — FLUTICASONE PROPIONATE 2 PUFF: 110 AEROSOL, METERED RESPIRATORY (INHALATION) at 21:57

## 2018-08-10 RX ADMIN — METOPROLOL TARTRATE 12.5 MG: 25 TABLET ORAL at 21:57

## 2018-08-10 RX ADMIN — FLUTICASONE PROPIONATE 1 SPRAY: 50 SPRAY, METERED NASAL at 09:15

## 2018-08-10 RX ADMIN — Medication 400 MG: at 17:32

## 2018-08-10 RX ADMIN — PANTOPRAZOLE SODIUM 20 MG: 20 TABLET, DELAYED RELEASE ORAL at 06:15

## 2018-08-10 RX ADMIN — FLUTICASONE PROPIONATE 2 PUFF: 110 AEROSOL, METERED RESPIRATORY (INHALATION) at 09:15

## 2018-08-10 RX ADMIN — FENTANYL CITRATE 25 MCG: 50 INJECTION, SOLUTION INTRAMUSCULAR; INTRAVENOUS at 14:59

## 2018-08-10 RX ADMIN — AMIODARONE HYDROCHLORIDE 200 MG: 200 TABLET ORAL at 09:14

## 2018-08-10 RX ADMIN — FERROUS SULFATE TAB 325 MG (65 MG ELEMENTAL FE) 325 MG: 325 (65 FE) TAB at 09:13

## 2018-08-10 RX ADMIN — FENTANYL CITRATE 25 MCG: 50 INJECTION, SOLUTION INTRAMUSCULAR; INTRAVENOUS at 15:57

## 2018-08-10 RX ADMIN — ACETYLCYSTEINE 1200 MG: 200 INHALANT RESPIRATORY (INHALATION) at 17:30

## 2018-08-10 RX ADMIN — AMLODIPINE BESYLATE 5 MG: 5 TABLET ORAL at 09:16

## 2018-08-10 RX ADMIN — ATORVASTATIN CALCIUM 80 MG: 80 TABLET, FILM COATED ORAL at 09:13

## 2018-08-10 RX ADMIN — HEPARIN SODIUM 5000 UNITS: 1000 INJECTION INTRAVENOUS; SUBCUTANEOUS at 16:00

## 2018-08-10 RX ADMIN — FENTANYL CITRATE 50 MCG: 50 INJECTION, SOLUTION INTRAMUSCULAR; INTRAVENOUS at 14:37

## 2018-08-10 RX ADMIN — INSULIN LISPRO 3 UNITS: 100 INJECTION, SOLUTION INTRAVENOUS; SUBCUTANEOUS at 06:15

## 2018-08-10 RX ADMIN — METHYLPREDNISOLONE 32 MG: 16 TABLET ORAL at 10:53

## 2018-08-10 RX ADMIN — MIDAZOLAM 0.5 MG: 1 INJECTION INTRAMUSCULAR; INTRAVENOUS at 16:16

## 2018-08-10 RX ADMIN — MIDAZOLAM 0.5 MG: 1 INJECTION INTRAMUSCULAR; INTRAVENOUS at 15:14

## 2018-08-10 RX ADMIN — LEVOTHYROXINE SODIUM 100 MCG: 100 TABLET ORAL at 06:15

## 2018-08-10 RX ADMIN — CEFAZOLIN SODIUM 1000 MG: 1 SOLUTION INTRAVENOUS at 22:14

## 2018-08-10 RX ADMIN — FENTANYL CITRATE 25 MCG: 50 INJECTION, SOLUTION INTRAMUSCULAR; INTRAVENOUS at 16:16

## 2018-08-10 RX ADMIN — ACETYLCYSTEINE 1200 MG: 200 INHALANT RESPIRATORY (INHALATION) at 09:15

## 2018-08-10 RX ADMIN — FENTANYL CITRATE 25 MCG: 50 INJECTION, SOLUTION INTRAMUSCULAR; INTRAVENOUS at 16:34

## 2018-08-10 RX ADMIN — INSULIN LISPRO 6 UNITS: 100 INJECTION, SOLUTION INTRAVENOUS; SUBCUTANEOUS at 22:10

## 2018-08-10 RX ADMIN — INSULIN GLARGINE 20 UNITS: 100 INJECTION, SOLUTION SUBCUTANEOUS at 22:09

## 2018-08-10 RX ADMIN — FENTANYL CITRATE 25 MCG: 50 INJECTION, SOLUTION INTRAMUSCULAR; INTRAVENOUS at 15:38

## 2018-08-10 RX ADMIN — LORATADINE 10 MG: 10 TABLET ORAL at 09:14

## 2018-08-10 RX ADMIN — IODIXANOL 42 ML: 320 INJECTION, SOLUTION INTRAVASCULAR at 17:00

## 2018-08-10 RX ADMIN — LEVETIRACETAM 750 MG: 750 TABLET ORAL at 21:58

## 2018-08-10 RX ADMIN — SODIUM CHLORIDE 75 ML/HR: 0.9 INJECTION, SOLUTION INTRAVENOUS at 00:32

## 2018-08-10 RX ADMIN — DIPHENHYDRAMINE HCL 50 MG: 25 TABLET ORAL at 10:53

## 2018-08-10 RX ADMIN — MIDAZOLAM 0.5 MG: 1 INJECTION INTRAMUSCULAR; INTRAVENOUS at 14:59

## 2018-08-10 RX ADMIN — ASPIRIN 81 MG 81 MG: 81 TABLET ORAL at 09:13

## 2018-08-10 NOTE — PROGRESS NOTES
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, L 2nd toe, R 3rd toe, who p/w L foot edema and erythema     Plan:  --RLE angiogram today w/ IR  --IV prep for contrast allergy completed  --Lasix, Lisinopril, Xarelto held  --NPO  --NS @75  --Wound care  --Medical management per primary team    Subjective/Objective     Subjective:     No acute events overnight  Reports pain in her b/l LE has much improved from yesterday, rating it as 3/10  Denies any numbness, tingling, or paresthesias  Objective:     Blood pressure 145/65, pulse 68, temperature 98 1 °F (36 7 °C), temperature source Oral, resp  rate 18, height 5' 4" (1 626 m), weight 73 1 kg (161 lb 1 6 oz), SpO2 92 %, not currently breastfeeding  ,Body mass index is 27 65 kg/m²  Intake/Output Summary (Last 24 hours) at 08/10/18 0513  Last data filed at 08/09/18 2107   Gross per 24 hour   Intake           1887 5 ml   Output             2000 ml   Net           -112 5 ml       Invasive Devices     Peripheral Intravenous Line            Peripheral IV 08/09/18 Right Forearm less than 1 day                Physical Exam:     GEN: NAD  HEENT: MMM  CV: RRR  Lung: normal effort  Ab: Soft, NT/ND  Extrem: No CCE; b/l feet wrapped in dry bandages  Neuro:  A+Ox3, motor and sensation grossly intact   Pulse exam: b/l palp fem, non palp b/l DP/PT, nondoppl DP/PT       Lab, Imaging and other studies:  CBC:   Lab Results   Component Value Date    WBC 7 99 08/09/2018    HGB 9 8 (L) 08/09/2018    HCT 32 4 (L) 08/09/2018    MCV 77 (L) 08/09/2018     08/09/2018    MCH 23 2 (L) 08/09/2018    MCHC 30 2 (L) 08/09/2018    RDW 18 1 (H) 08/09/2018    MPV 10 8 08/09/2018    NRBC 0 08/09/2018   , CMP:   Lab Results   Component Value Date     08/09/2018    K 4 0 08/09/2018     08/09/2018    CO2 27 08/09/2018    ANIONGAP 7 08/09/2018 BUN 19 08/09/2018    CREATININE 1 06 08/09/2018    GLUCOSE 111 08/09/2018    CALCIUM 7 9 (L) 08/09/2018    EGFR 50 08/09/2018   , Coagulation: No results found for: PT, INR, APTT, Urinalysis: No results found for: Rosy Penning, SPECGRAV, PHUR, LEUKOCYTESUR, NITRITE, PROTEINUA, GLUCOSEU, KETONESU, BILIRUBINUR, BLOODU, Amylase: No results found for: AMYLASE, Lipase: No results found for: LIPASE    VTE Mechanical Prophylaxis: sequential compression device

## 2018-08-10 NOTE — BRIEF OP NOTE (RAD/CATH)
Right lower extremity angiogram with angioplasty Procedure Note    PATIENT NAME: Freya Nichlos  : 1940  MRN: [id number]     Pre-op Diagnosis:   1  Cellulitis    2  Atherosclerosis of native artery of right lower extremity with ulceration of other part of foot ([address] )    3  Ulcer of toe of left foot, unspecified ulcer stage ([address] )    4  Ulcer of toe of right foot, unspecified ulcer stage ([address] )    5  Atherosclerosis of native artery of both lower extremities with bilateral ulceration, unspecified ulceration site ([facility])      Post-op Diagnosis:   1  Cellulitis    2  Atherosclerosis of native artery of right lower extremity with ulceration of other part of foot ([address] )    3  Ulcer of toe of left foot, unspecified ulcer stage ([address] )    4  Ulcer of toe of right foot, unspecified ulcer stage ([address] )    5  Atherosclerosis of native artery of both lower extremities with bilateral ulceration, unspecified ulceration site [facility])        Surgeon:   MD Jaron Galvan MD    Estimated Blood Loss: Minimal    Findings:   1  The right common femoral, superficial femoral, profunda femoral, popliteal arteries were patent with mild diffuse disease throughout, without focal stenosis  2  Severe three-vessel tibial disease with complete occlusion of the right anterior tibial artery, occluded posterior tibial artery in the proximal segment  3  Right peroneal artery is patent proximally and distally, with focal stenosis in the mid segment  4  The right posterior tibialis and dorsalis pedis reconstitute distally through the peroneal artery and other collaterals  5  Successful recanalization and angioplasty of the mid right peroneal artery and angioplasty of distal SFA which resulted in complete patency of the right peroneal artery and increased luminal diameter of distal SFA      Specimens: None    Complications:  None    Anesthesia: Conscious sedation and Local    Josemanuel Ford MD     Date: 8/10/2018  Time: 4:49 PM

## 2018-08-10 NOTE — CONSULTS
IR H&P    HPI:  [de-identified]year old female with PAD, dry gangrene of bilateral toes presented with left foot edema and erythema  LLE angiogram was performed on 12/19/2017 which showed severe 3 vessel tibial disease  Patient returns for RLE angiogram     PMH:  DVT  PE  HTN  DM II  Afib  PAD  CVA  CHF  GERD  HLD    Vitals:    08/10/18 0913   BP: 165/77   Pulse: 64   Resp:    Temp:    SpO2:      Gen: NAD  Ext: Dry gangrene on toes of bilateral feet      INR 1 2  Cr 0 94    A:  [de-identified]year old female with PAD, dry gangrene of bilateral toes presented with left foot edema and erythema  LLE angiogram was performed on 12/19/2017 which showed severe 3 vessel tibial disease  Patient returns for RLE angiogram     P:  - RLE angiogram with possible angioplasty, thrombectomy, stenting

## 2018-08-10 NOTE — PROGRESS NOTES
IM Residency Progress Note   Unit/Bed#: CW2 210-02 Encounter: [id number]  SOD Team C       Matilde Ackerman [de-identified] y o  female [id number]    Hospital Stay Days: 1      Assessment/Plan:  Patient is an 24-year-old female past medical history hypertension, hyperlipidemia, combined diastolic and systolic CHF, severe aortic stenosis, peripheral vascular disease who presented with bilateral toe wounds and cellulitis of left foot secondary to peripheral vascular disease  Patient is on Ancef for cellulitis  Status post IR angioplasty of right peroneal artery and distal SFA  Principal Problem:    Cellulitis  Active Problems:    Essential hypertension    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 native heart without angina pectoris    Type 2 diabetes mellitus with complication, with long-term current use of insulin ([facility])    Atrial flutter ([facility])    Hyperlipidemia    Gastroesophageal reflux disease without esophagitis    Chronic combined systolic and diastolic congestive heart failure ([facility])    Paroxysmal atrial fibrillation ([facility])    History of CVA (cerebrovascular accident)    Seizure [address] [address] )    Anemia    PAD (peripheral artery disease) ([facility])    CKD (chronic kidney disease) stage 3, GFR 30-59 ml/min     1  B/L Toe Wounds with Cellulitis of the L foot 2/2 to PVD with dry gangrene- patient with a history of PVD and recurrent cellulitis  S/p Successful angioplasty of the mid right peroneal artery and distal SFA which resulted in complete patency of the right peroneal artery and increased luminal diameter of distal SFA with vascular surgery   - Continue ancef antibiotic day 3 (patient had cephalosporin allergy listed with chest pain as the reaction    However she tolerated IV cefozolin without any issues)  -Wound Care consulted  - once able to be discharge will sent home on PO keflex for total 7 day course  - will need close follow up with wound center   - continue norco 5 PRN for pain  -follow-up repeat ABIs per vascular surgery      2  Combined Diastolic & Systolic Heart Failure   -ROBB on 4377 showed systolic function was moderately to markedly reduced  Ejection fraction was estimated to be 35 %   -appears euvolemic on exam  -Continue Furosemide 40 mg daily   -Continue Lisinopril 5 mg     3  CAD   -ASA 81 mg   -Atorvastatin 80 mg  -Metoprolol 12 5 mg     4  Paroxysmal Atrial Fibrillation  -NSR  -rates well controlled  -Amiodarone 200 mg  -Xarelto 20 mg daily      5  Type 2 DM  -Metformin and Lantus at home  -Hold Metformin  -Lantus 20 units q12 hours  -Humalog with meals and daily at bedtime      6  GERD  -Pantoprazole 20 mg      7  Microcytic Anemia  -Hgb 9 8 this AM  -Ferrous sulfate every other day      8  Asthma  -Albuterol prn  -Fluticasone 2 puffs q12 hours      9  Seizure Disorder  -Keppra 750 mg q12 hours      10  Hypothyroidism  Levothyroxine 1000 mcg      Disposition:  If okay from vascular surgery, will DC home with outpatient follow-up    Subjective:   Patient seen examined at bedside  No complaints eating breakfast   Denies any pain, chest pain, shortness of breath, abdominal pain, fevers, chills  Vitals: Temp (24hrs), Av 1 °F (36 7 °C), Min:98 1 °F (36 7 °C), Max:98 1 °F (36 7 °C)  Current: Temperature: 98 1 °F (36 7 °C)  Vitals:    08/10/18 1624 08/10/18 1629 08/10/18 1634 08/10/18 1639   BP: (!) 173/76 (!) 180/78 (!) 173/74 (!) 181/79   BP Location:       Pulse: 59 59 60 59   Resp:    Temp:       TempSrc:       SpO2: 95% 98% 96% 97%   Weight:       Height:        Body mass index is 27 65 kg/m²  I/O last 24 hours: In: 2367 5 [P O :1315; I V :1052 5]  Out: 2350 [Urine:2350]      Physical Exam:   Physical Exam   Constitutional: She is oriented to person, place, and time  She appears well-developed and well-nourished  No distress  HENT:   Head: Normocephalic and atraumatic     Eyes: Conjunctivae and EOM are normal    Cardiovascular: Normal rate, regular rhythm and normal heart sounds  No murmur heard  Pulmonary/Chest: Effort normal and breath sounds normal  No respiratory distress  She has no wheezes  She has no rales  Abdominal: Soft  Bowel sounds are normal  She exhibits no distension  There is no tenderness  There is no guarding  Musculoskeletal: She exhibits no edema, tenderness or deformity  Neurological: She is alert and oriented to person, place, and time  No cranial nerve deficit  Skin: Skin is warm and dry  She is not diaphoretic  Ulcers and erythema surrounding left great toe    Psychiatric: She has a normal mood and affect   Her behavior is normal  Judgment and thought content normal        Invasive Devices     Peripheral Intravenous Line            Peripheral IV 08/09/18 Right Forearm 1 day                          Labs: Labs personally reviewed    Radiology Results: Imaging personally reviewed    Active Meds:   Current Facility-Administered Medications   Medication Dose Route Frequency    acetaminophen (TYLENOL) tablet 650 mg  650 mg Oral Q6H PRN    acetylcysteine (MUCOMYST) 200 mg/mL oral solution 1,200 mg  1,200 mg Oral BID    albuterol (PROVENTIL HFA,VENTOLIN HFA) inhaler 1 puff  1 puff Inhalation BID PRN    amiodarone tablet 200 mg  200 mg Oral Daily With Breakfast    amLODIPine (NORVASC) tablet 5 mg  5 mg Oral Daily    aspirin chewable tablet 81 mg  81 mg Oral Daily    atorvastatin (LIPITOR) tablet 80 mg  80 mg Oral Daily    calcium carbonate (TUMS) chewable tablet 1,000 mg  1,000 mg Oral TID    ceFAZolin (ANCEF) IVPB (premix) 1,000 mg  1,000 mg Intravenous Q8H    diphenhydrAMINE (BENADRYL) tablet 50 mg  50 mg Oral 60 Min Pre-Op    docusate sodium (COLACE) capsule 100 mg  100 mg Oral BID PRN    ferrous sulfate tablet 325 mg  325 mg Oral Daily With Breakfast    fluticasone (FLONASE) 50 mcg/act nasal spray 1 spray  1 spray Nasal Daily    fluticasone (FLOVENT HFA) 110 MCG/ACT inhaler 2 puff  2 puff Inhalation Q12H [address]    HYDROcodone-acetaminophen (NORCO) 5-325 mg per tablet 1 tablet  1 tablet Oral Q6H PRN    insulin glargine (LANTUS) subcutaneous injection 20 Units 0 2 mL  20 Units Subcutaneous Q12H [address]    insulin lispro (HumaLOG) 100 units/mL subcutaneous injection 1-5 Units  1-5 Units Subcutaneous TID AC    insulin lispro (HumaLOG) 100 units/mL subcutaneous injection 1-5 Units  1-5 Units Subcutaneous HS    levETIRAcetam (KEPPRA) tablet 750 mg  750 mg Oral Q12H [address]    levothyroxine tablet 100 mcg  100 mcg Oral Early Morning    loratadine (CLARITIN) tablet 10 mg  10 mg Oral Daily    magnesium oxide (MAG-OX) tablet 400 mg  400 mg Oral BID    metoclopramide (REGLAN) tablet 5 mg  5 mg Oral Q6H PRN    metoprolol tartrate (LOPRESSOR) partial tablet 12 5 mg  12 5 mg Oral Q12H [address]    nitroglycerin (NITROSTAT) SL tablet 0 4 mg  0 4 mg Sublingual Q3 min PRN    pantoprazole (PROTONIX) EC tablet 20 mg  20 mg Oral Early Morning    polyethylene glycol (MIRALAX) packet 17 g  17 g Oral Daily PRN    sodium chloride 0 9 % infusion  75 mL/hr Intravenous Continuous         VTE Pharmacologic Prophylaxis: Reason for no pharmacologic prophylaxis xarelto   VTE Mechanical Prophylaxis: Bilateral SCDs    Patricia Wolfe DO

## 2018-08-10 NOTE — PROGRESS NOTES
IM Residency Progress Note   Unit/Bed#: CW2 210-02 Encounter: [id number]  SOD Team C       Martha Li [de-identified] y o  female [id number]    Hospital Stay Days: 1      Assessment/Plan:    Principal Problem:    Cellulitis  Active Problems:    Essential hypertension    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 native heart without angina pectoris    Type 2 diabetes mellitus with complication, with long-term current use of insulin (HCC)    Atrial flutter (HCC)    Hyperlipidemia    Gastroesophageal reflux disease without esophagitis    Chronic combined systolic and diastolic congestive heart failure (HCC)    Paroxysmal atrial fibrillation (HCC)    History of CVA (cerebrovascular accident)    Seizure ([facility] [address] )    Anemia    PAD (peripheral artery disease) ([facility])    CKD (chronic kidney disease) stage 3, GFR 30-59 ml/min    1  B/L Toe Wounds with Cellulitis of the L foot 2/2 to PVD with dry gangrene- patient with a history of PVD and recurrent cellulitis  Going to IR today for arteriogram with possible intervention   - Continue ancef antibiotic day 2 (patient had cephalosporin allergy listed with chest pain as the reaction  However she tolerated IV cefozolin without any issues)  -Wound Care consulted  - once able to be discharge will sent home on PO keflex for total 7 day course  - will need close follow up with wound center   - add norco 5 PRN for pain     2  Combined Diastolic & Systolic Heart Failure   -ROBB on 5/1/4164 showed systolic function was moderately to markedly reduced  Ejection fraction was estimated to be 35 %   -appears euvolemic on exam  -Continue Furosemide 40 mg daily   -Continue Lisinopril 5 mg     3  CAD   -ASA 81 mg   -Atorvastatin 80 mg  -Metoprolol 12 5 mg     4  Paroxysmal Atrial Fibrillation  -NSR  -rates well controlled  -Amiodarone 200 mg  -Xarelto 20 mg daily (held for procedure today)     5   Type 2 DM  -Metformin and Lantus at home  -Hold Metformin  -Lantus 20 units q12 hours  -Humalog with meals and daily at bedtime (algorithm 2)     6  GERD  -Pantoprazole 20 mg      7  Microcytic Anemia  -Hgb 9 8 this AM  -Ferrous sulfate      8  Asthma  -Albuterol prn  -Fluticasone 2 puffs q12 hours      9  Seizure Disorder  -Keppra 750 mg q12 hours      10  Hypothyroidism  Levothyroxine 1000 mcg     Disposition: IR today for arteriogram with possible intervention  Can possibly be discharged home depending on vascular team's recommendations  Subjective:   Patient did not sleep well overnight  She has no other new complaints today  No acute events overnight  Vitals: Temp (24hrs), Av 2 °F (36 8 °C), Min:98 1 °F (36 7 °C), Max:98 2 °F (36 8 °C)  Current: Temperature: 98 1 °F (36 7 °C)  Vitals:    08/10/18 1458 08/10/18 1504 08/10/18 1509 08/10/18 1513   BP: 160/72 164/70 166/70 164/72   BP Location:       Pulse: 62 61 61 61   Resp:    Temp:       TempSrc:       SpO2: 94% 95% 93% 96%   Weight:       Height:        Body mass index is 27 65 kg/m²  I/O last 24 hours: In: 2367 5 [P O :1315; I V :1052 5]  Out: 2350 [Urine:2350]      Physical Exam   Constitutional: She is oriented to person, place, and time  She appears well-developed and well-nourished  No distress  HENT:   Head: Normocephalic and atraumatic  Eyes: EOM are normal    Neck: No tracheal deviation present  Cardiovascular: Normal rate and regular rhythm  No murmur heard  Pulmonary/Chest: Effort normal and breath sounds normal  No stridor  She has no wheezes  Abdominal: Soft  Bowel sounds are normal  She exhibits no distension  There is no tenderness  Musculoskeletal: She exhibits no edema  Neurological: She is alert and oriented to person, place, and time  Skin: Skin is warm and dry  BL toe ulcerations with dry gangrene of 3rd R toe  Interval improvement of cellulitis of L foot   Psychiatric: She has a normal mood and affect  Nursing note and vitals reviewed          Invasive Devices     Peripheral Intravenous Line            Peripheral IV 08/09/18 Right Forearm 1 day                Labs:   Recent Results (from the past 24 hour(s))   Fingerstick Glucose (POCT)    Collection Time: 08/09/18  3:50 PM   Result Value Ref Range    POC Glucose 426 (H) 65 - 140 mg/dl   Fingerstick Glucose (POCT)    Collection Time: 08/09/18  3:52 PM   Result Value Ref Range    POC Glucose 460 (H) 65 - 140 mg/dl   Fingerstick Glucose (POCT)    Collection Time: 08/09/18  9:04 PM   Result Value Ref Range    POC Glucose 497 (H) 65 - 140 mg/dl   CBC    Collection Time: 08/10/18  4:33 AM   Result Value Ref Range    WBC 10 24 (H) 4 31 - 10 16 Thousand/uL    RBC 4 15 3 81 - 5 12 Million/uL    Hemoglobin 9 9 (L) 11 5 - 15 4 g/dL    Hematocrit 31 7 (L) 34 8 - 46 1 %    MCV 76 (L) 82 - 98 fL    MCH 23 9 (L) 26 8 - 34 3 pg    MCHC 31 2 (L) 31 4 - 37 4 g/dL    RDW 17 6 (H) 11 6 - 15 1 %    Platelets 390 678 - 066 Thousands/uL    MPV 11 1 8 9 - 12 7 fL   Basic metabolic panel    Collection Time: 08/10/18  4:33 AM   Result Value Ref Range    Sodium 136 136 - 145 mmol/L    Potassium 4 1 3 5 - 5 3 mmol/L    Chloride 102 100 - 108 mmol/L    CO2 27 21 - 32 mmol/L    Anion Gap 7 4 - 13 mmol/L    BUN 19 5 - 25 mg/dL    Creatinine 0 94 0 60 - 1 30 mg/dL    Glucose 315 (H) 65 - 140 mg/dL    Calcium 8 3 8 3 - 10 1 mg/dL    eGFR 57 ml/min/1 73sq m   Protime-INR    Collection Time: 08/10/18  4:33 AM   Result Value Ref Range    Protime 15 3 (H) 11 8 - 14 2 seconds    INR 1 20 (H) 0 86 - 1 17   Fingerstick Glucose (POCT)    Collection Time: 08/10/18  6:09 AM   Result Value Ref Range    POC Glucose 306 (H) 65 - 140 mg/dl   Fingerstick Glucose (POCT)    Collection Time: 08/10/18 10:54 AM   Result Value Ref Range    POC Glucose 222 (H) 65 - 140 mg/dl       Radiology Results: I have personally reviewed pertinent reports  Other Diagnostic Testing:   I have personally reviewed pertinent reports          Active Meds:   Current Facility-Administered Medications Medication Dose Route Frequency    acetaminophen (TYLENOL) tablet 650 mg  650 mg Oral Q6H PRN    acetylcysteine (MUCOMYST) 200 mg/mL oral solution 1,200 mg  1,200 mg Oral BID    albuterol (PROVENTIL HFA,VENTOLIN HFA) inhaler 1 puff  1 puff Inhalation BID PRN    amiodarone tablet 200 mg  200 mg Oral Daily With Breakfast    amLODIPine (NORVASC) tablet 5 mg  5 mg Oral Daily    aspirin chewable tablet 81 mg  81 mg Oral Daily    atorvastatin (LIPITOR) tablet 80 mg  80 mg Oral Daily    calcium carbonate (TUMS) chewable tablet 1,000 mg  1,000 mg Oral TID    ceFAZolin (ANCEF) IVPB (premix) 1,000 mg  1,000 mg Intravenous Q8H    diphenhydrAMINE (BENADRYL) tablet 50 mg  50 mg Oral 60 Min Pre-Op    docusate sodium (COLACE) capsule 100 mg  100 mg Oral BID PRN    fentanyl citrate (PF) 100 MCG/2ML   Intravenous Code/Trauma/Sedation Med    ferrous sulfate tablet 325 mg  325 mg Oral Daily With Breakfast    fluticasone (FLONASE) 50 mcg/act nasal spray 1 spray  1 spray Nasal Daily    fluticasone (FLOVENT HFA) 110 MCG/ACT inhaler 2 puff  2 puff Inhalation Q12H [facility]    HYDROcodone-acetaminophen (NORCO) 5-325 mg per tablet 1 tablet  1 tablet Oral Q6H PRN    insulin glargine (LANTUS) subcutaneous injection 20 Units 0 2 mL  20 Units Subcutaneous Q12H [facility]    insulin lispro (HumaLOG) 100 units/mL subcutaneous injection 1-5 Units  1-5 Units Subcutaneous TID AC    insulin lispro (HumaLOG) 100 units/mL subcutaneous injection 1-5 Units  1-5 Units Subcutaneous HS    levETIRAcetam (KEPPRA) tablet 750 mg  750 mg Oral Q12H [facility]    levothyroxine tablet 100 mcg  100 mcg Oral Early Morning    loratadine (CLARITIN) tablet 10 mg  10 mg Oral Daily    magnesium oxide (MAG-OX) tablet 400 mg  400 mg Oral BID    metoclopramide (REGLAN) tablet 5 mg  5 mg Oral Q6H PRN    metoprolol tartrate (LOPRESSOR) partial tablet 12 5 mg  12 5 mg Oral Q12H [facility]    midazolam (VERSED) injection    Code/Trauma/Sedation Med    nitroglycerin (NITROSTAT) SL tablet 0 4 mg  0 4 mg Sublingual Q3 min PRN    pantoprazole (PROTONIX) EC tablet 20 mg  20 mg Oral Early Morning    polyethylene glycol (MIRALAX) packet 17 g  17 g Oral Daily PRN    sodium chloride 0 9 % infusion  75 mL/hr Intravenous Continuous         VTE Pharmacologic Prophylaxis: Reason for no pharmacologic prophylaxis on Xarelto  VTE Mechanical Prophylaxis: sequential compression device    Isidoro Lynn DO

## 2018-08-11 LAB
GLUCOSE SERPL-MCNC: 196 MG/DL (ref 65–140)
GLUCOSE SERPL-MCNC: 204 MG/DL (ref 65–140)
GLUCOSE SERPL-MCNC: 208 MG/DL (ref 65–140)
GLUCOSE SERPL-MCNC: 224 MG/DL (ref 65–140)

## 2018-08-11 PROCEDURE — 82948 REAGENT STRIP/BLOOD GLUCOSE: CPT

## 2018-08-11 PROCEDURE — 99231 SBSQ HOSP IP/OBS SF/LOW 25: CPT | Performed by: SURGERY

## 2018-08-11 PROCEDURE — 99233 SBSQ HOSP IP/OBS HIGH 50: CPT | Performed by: INTERNAL MEDICINE

## 2018-08-11 RX ORDER — FERROUS SULFATE 325(65) MG
325 TABLET ORAL EVERY OTHER DAY
Status: DISCONTINUED | OUTPATIENT
Start: 2018-08-12 | End: 2018-08-24 | Stop reason: HOSPADM

## 2018-08-11 RX ADMIN — Medication 400 MG: at 17:07

## 2018-08-11 RX ADMIN — LEVETIRACETAM 750 MG: 750 TABLET ORAL at 08:39

## 2018-08-11 RX ADMIN — AMIODARONE HYDROCHLORIDE 200 MG: 200 TABLET ORAL at 07:58

## 2018-08-11 RX ADMIN — ALBUTEROL SULFATE 1 PUFF: 90 AEROSOL, METERED RESPIRATORY (INHALATION) at 22:11

## 2018-08-11 RX ADMIN — Medication 400 MG: at 08:39

## 2018-08-11 RX ADMIN — SODIUM CHLORIDE 75 ML/HR: 0.9 INJECTION, SOLUTION INTRAVENOUS at 05:23

## 2018-08-11 RX ADMIN — FLUTICASONE PROPIONATE 2 PUFF: 110 AEROSOL, METERED RESPIRATORY (INHALATION) at 22:11

## 2018-08-11 RX ADMIN — INSULIN GLARGINE 20 UNITS: 100 INJECTION, SOLUTION SUBCUTANEOUS at 22:04

## 2018-08-11 RX ADMIN — HYDROCODONE BITARTRATE AND ACETAMINOPHEN 1 TABLET: 5; 325 TABLET ORAL at 13:42

## 2018-08-11 RX ADMIN — CEFAZOLIN SODIUM 1000 MG: 1 SOLUTION INTRAVENOUS at 13:43

## 2018-08-11 RX ADMIN — LEVOTHYROXINE SODIUM 100 MCG: 100 TABLET ORAL at 05:25

## 2018-08-11 RX ADMIN — LORATADINE 10 MG: 10 TABLET ORAL at 08:39

## 2018-08-11 RX ADMIN — LEVETIRACETAM 750 MG: 750 TABLET ORAL at 22:04

## 2018-08-11 RX ADMIN — INSULIN LISPRO 2 UNITS: 100 INJECTION, SOLUTION INTRAVENOUS; SUBCUTANEOUS at 17:06

## 2018-08-11 RX ADMIN — ALBUTEROL SULFATE 1 PUFF: 90 AEROSOL, METERED RESPIRATORY (INHALATION) at 22:10

## 2018-08-11 RX ADMIN — PANTOPRAZOLE SODIUM 20 MG: 20 TABLET, DELAYED RELEASE ORAL at 05:25

## 2018-08-11 RX ADMIN — FLUTICASONE PROPIONATE 1 SPRAY: 50 SPRAY, METERED NASAL at 08:43

## 2018-08-11 RX ADMIN — FLUTICASONE PROPIONATE 2 PUFF: 110 AEROSOL, METERED RESPIRATORY (INHALATION) at 08:43

## 2018-08-11 RX ADMIN — ASPIRIN 81 MG 81 MG: 81 TABLET ORAL at 08:39

## 2018-08-11 RX ADMIN — CEFAZOLIN SODIUM 1000 MG: 1 SOLUTION INTRAVENOUS at 22:14

## 2018-08-11 RX ADMIN — HYDROCODONE BITARTRATE AND ACETAMINOPHEN 1 TABLET: 5; 325 TABLET ORAL at 22:04

## 2018-08-11 RX ADMIN — INSULIN LISPRO 1 UNITS: 100 INJECTION, SOLUTION INTRAVENOUS; SUBCUTANEOUS at 07:58

## 2018-08-11 RX ADMIN — ATORVASTATIN CALCIUM 80 MG: 80 TABLET, FILM COATED ORAL at 08:38

## 2018-08-11 RX ADMIN — INSULIN LISPRO 2 UNITS: 100 INJECTION, SOLUTION INTRAVENOUS; SUBCUTANEOUS at 11:10

## 2018-08-11 RX ADMIN — ACETYLCYSTEINE 1200 MG: 200 INHALANT RESPIRATORY (INHALATION) at 10:13

## 2018-08-11 RX ADMIN — AMLODIPINE BESYLATE 5 MG: 5 TABLET ORAL at 08:39

## 2018-08-11 RX ADMIN — HYDROCODONE BITARTRATE AND ACETAMINOPHEN 1 TABLET: 5; 325 TABLET ORAL at 00:52

## 2018-08-11 RX ADMIN — FERROUS SULFATE TAB 325 MG (65 MG ELEMENTAL FE) 325 MG: 325 (65 FE) TAB at 07:58

## 2018-08-11 RX ADMIN — METOPROLOL TARTRATE 12.5 MG: 25 TABLET ORAL at 08:38

## 2018-08-11 RX ADMIN — INSULIN GLARGINE 20 UNITS: 100 INJECTION, SOLUTION SUBCUTANEOUS at 08:39

## 2018-08-11 RX ADMIN — INSULIN LISPRO 2 UNITS: 100 INJECTION, SOLUTION INTRAVENOUS; SUBCUTANEOUS at 22:02

## 2018-08-11 RX ADMIN — METOPROLOL TARTRATE 12.5 MG: 25 TABLET ORAL at 22:07

## 2018-08-11 RX ADMIN — CEFAZOLIN SODIUM 1000 MG: 1 SOLUTION INTRAVENOUS at 05:33

## 2018-08-11 NOTE — PROGRESS NOTES
Progress Note - Vascular Surgery   Nash Guzman [de-identified] y o  female MRN: [id number]  Unit/Bed#: -01 Encounter: [id number]    Assessment:  [de-identified] y/o F w/ PAD, dry gangrene on b/l 1st toes, L 2nd toe, R 3rd toe, who p/w L foot edema and erythema   --8/10: IR RLE angiogram--angioplasties of R distal SFA, mid R peroneal artery    Plan:  --CCL 3 Diet  --NS @75  --Bowel regimen  --Xarelto held    Subjective/Objective     Subjective:    No acute events overnight  Pt feels well this AM, denies any complaints  Objective:     Blood pressure 151/66, pulse 55, temperature 98 2 °F (36 8 °C), temperature source Oral, resp  rate 18, height 5' 4" (1 626 m), weight 73 1 kg (161 lb 1 6 oz), SpO2 96 %, not currently breastfeeding  ,Body mass index is 27 65 kg/m²  Intake/Output Summary (Last 24 hours) at 08/11/18 0651  Last data filed at 08/11/18 0600   Gross per 24 hour   Intake              740 ml   Output              900 ml   Net             -160 ml       Invasive Devices     Peripheral Intravenous Line            Peripheral IV 08/10/18 Left Forearm less than 1 day                Physical Exam:     GEN: NAD  HEENT: MMM  CV: RRR  Lung: normal effort  Ab: Soft, NT/ND  Extrem: No CCE  Neuro:  A+Ox3, motor and sensation grossly intact  Pulses: palp fem b/l; doppl R DP/PT    Lab, Imaging and other studies:CBC: No results found for: WBC, HGB, HCT, MCV, PLT, ADJUSTEDWBC, MCH, MCHC, RDW, MPV, NRBC, CMP: No results found for: NA, K, CL, CO2, ANIONGAP, BUN, CREATININE, GLUCOSE, CALCIUM, AST, ALT, ALKPHOS, PROT, ALBUMIN, BILITOT, EGFR, Coagulation: No results found for: PT, INR, APTT, Urinalysis: No results found for: COLORU, CLARITYU, SPECGRAV, PHUR, LEUKOCYTESUR, NITRITE, PROTEINUA, GLUCOSEU, KETONESU, BILIRUBINUR, BLOODU, Amylase: No results found for: AMYLASE, Lipase: No results found for: LIPASE

## 2018-08-12 ENCOUNTER — APPOINTMENT (INPATIENT)
Dept: RADIOLOGY | Facility: HOSPITAL | Age: 78
DRG: 252 | End: 2018-08-12
Payer: MEDICARE

## 2018-08-12 LAB
ANION GAP SERPL CALCULATED.3IONS-SCNC: 5 MMOL/L (ref 4–13)
BASOPHILS # BLD AUTO: 0.02 THOUSANDS/ΜL (ref 0–0.1)
BASOPHILS NFR BLD AUTO: 0 % (ref 0–1)
BUN SERPL-MCNC: 14 MG/DL (ref 5–25)
CALCIUM SERPL-MCNC: 8 MG/DL (ref 8.3–10.1)
CHLORIDE SERPL-SCNC: 106 MMOL/L (ref 100–108)
CO2 SERPL-SCNC: 28 MMOL/L (ref 21–32)
CREAT SERPL-MCNC: 0.73 MG/DL (ref 0.6–1.3)
EOSINOPHIL # BLD AUTO: 0.23 THOUSAND/ΜL (ref 0–0.61)
EOSINOPHIL NFR BLD AUTO: 2 % (ref 0–6)
ERYTHROCYTE [DISTWIDTH] IN BLOOD BY AUTOMATED COUNT: 18 % (ref 11.6–15.1)
GFR SERPL CREATININE-BSD FRML MDRD: 78 ML/MIN/1.73SQ M
GLUCOSE SERPL-MCNC: 100 MG/DL (ref 65–140)
GLUCOSE SERPL-MCNC: 152 MG/DL (ref 65–140)
GLUCOSE SERPL-MCNC: 190 MG/DL (ref 65–140)
GLUCOSE SERPL-MCNC: 217 MG/DL (ref 65–140)
GLUCOSE SERPL-MCNC: 82 MG/DL (ref 65–140)
HCT VFR BLD AUTO: 32.1 % (ref 34.8–46.1)
HGB BLD-MCNC: 9.8 G/DL (ref 11.5–15.4)
IMM GRANULOCYTES # BLD AUTO: 0.06 THOUSAND/UL (ref 0–0.2)
IMM GRANULOCYTES NFR BLD AUTO: 1 % (ref 0–2)
LYMPHOCYTES # BLD AUTO: 2.33 THOUSANDS/ΜL (ref 0.6–4.47)
LYMPHOCYTES NFR BLD AUTO: 21 % (ref 14–44)
MCH RBC QN AUTO: 23.7 PG (ref 26.8–34.3)
MCHC RBC AUTO-ENTMCNC: 30.5 G/DL (ref 31.4–37.4)
MCV RBC AUTO: 78 FL (ref 82–98)
MONOCYTES # BLD AUTO: 1.13 THOUSAND/ΜL (ref 0.17–1.22)
MONOCYTES NFR BLD AUTO: 10 % (ref 4–12)
NEUTROPHILS # BLD AUTO: 7.25 THOUSANDS/ΜL (ref 1.85–7.62)
NEUTS SEG NFR BLD AUTO: 66 % (ref 43–75)
NRBC BLD AUTO-RTO: 0 /100 WBCS
PLATELET # BLD AUTO: 235 THOUSANDS/UL (ref 149–390)
PMV BLD AUTO: 10.5 FL (ref 8.9–12.7)
POTASSIUM SERPL-SCNC: 4.1 MMOL/L (ref 3.5–5.3)
RBC # BLD AUTO: 4.14 MILLION/UL (ref 3.81–5.12)
SODIUM SERPL-SCNC: 139 MMOL/L (ref 136–145)
WBC # BLD AUTO: 11.02 THOUSAND/UL (ref 4.31–10.16)

## 2018-08-12 PROCEDURE — 87077 CULTURE AEROBIC IDENTIFY: CPT | Performed by: STUDENT IN AN ORGANIZED HEALTH CARE EDUCATION/TRAINING PROGRAM

## 2018-08-12 PROCEDURE — 87205 SMEAR GRAM STAIN: CPT | Performed by: STUDENT IN AN ORGANIZED HEALTH CARE EDUCATION/TRAINING PROGRAM

## 2018-08-12 PROCEDURE — 99233 SBSQ HOSP IP/OBS HIGH 50: CPT | Performed by: INTERNAL MEDICINE

## 2018-08-12 PROCEDURE — 80048 BASIC METABOLIC PNL TOTAL CA: CPT | Performed by: INTERNAL MEDICINE

## 2018-08-12 PROCEDURE — 0JBQ0ZZ EXCISION OF RIGHT FOOT SUBCUTANEOUS TISSUE AND FASCIA, OPEN APPROACH: ICD-10-PCS | Performed by: PODIATRIST

## 2018-08-12 PROCEDURE — 82948 REAGENT STRIP/BLOOD GLUCOSE: CPT

## 2018-08-12 PROCEDURE — 85025 COMPLETE CBC W/AUTO DIFF WBC: CPT | Performed by: INTERNAL MEDICINE

## 2018-08-12 PROCEDURE — 87070 CULTURE OTHR SPECIMN AEROBIC: CPT | Performed by: STUDENT IN AN ORGANIZED HEALTH CARE EDUCATION/TRAINING PROGRAM

## 2018-08-12 PROCEDURE — 73630 X-RAY EXAM OF FOOT: CPT

## 2018-08-12 PROCEDURE — 87186 SC STD MICRODIL/AGAR DIL: CPT | Performed by: STUDENT IN AN ORGANIZED HEALTH CARE EDUCATION/TRAINING PROGRAM

## 2018-08-12 PROCEDURE — 0HBNXZZ EXCISION OF LEFT FOOT SKIN, EXTERNAL APPROACH: ICD-10-PCS | Performed by: PODIATRIST

## 2018-08-12 PROCEDURE — 87147 CULTURE TYPE IMMUNOLOGIC: CPT | Performed by: STUDENT IN AN ORGANIZED HEALTH CARE EDUCATION/TRAINING PROGRAM

## 2018-08-12 RX ORDER — INSULIN GLARGINE 100 [IU]/ML
22 INJECTION, SOLUTION SUBCUTANEOUS EVERY 12 HOURS SCHEDULED
Status: DISCONTINUED | OUTPATIENT
Start: 2018-08-12 | End: 2018-08-19

## 2018-08-12 RX ORDER — HYDROCODONE BITARTRATE AND ACETAMINOPHEN 5; 325 MG/1; MG/1
1 TABLET ORAL EVERY 4 HOURS PRN
Status: DISCONTINUED | OUTPATIENT
Start: 2018-08-12 | End: 2018-08-14

## 2018-08-12 RX ADMIN — ATORVASTATIN CALCIUM 80 MG: 80 TABLET, FILM COATED ORAL at 08:23

## 2018-08-12 RX ADMIN — POLYETHYLENE GLYCOL 3350 17 G: 17 POWDER, FOR SOLUTION ORAL at 08:31

## 2018-08-12 RX ADMIN — HYDROCODONE BITARTRATE AND ACETAMINOPHEN 1 TABLET: 5; 325 TABLET ORAL at 19:18

## 2018-08-12 RX ADMIN — CEFAZOLIN SODIUM 1000 MG: 1 SOLUTION INTRAVENOUS at 21:32

## 2018-08-12 RX ADMIN — CEFAZOLIN SODIUM 1000 MG: 1 SOLUTION INTRAVENOUS at 06:00

## 2018-08-12 RX ADMIN — METOPROLOL TARTRATE 12.5 MG: 25 TABLET ORAL at 20:21

## 2018-08-12 RX ADMIN — LEVOTHYROXINE SODIUM 100 MCG: 100 TABLET ORAL at 05:59

## 2018-08-12 RX ADMIN — LORATADINE 10 MG: 10 TABLET ORAL at 08:23

## 2018-08-12 RX ADMIN — FLUTICASONE PROPIONATE 2 PUFF: 110 AEROSOL, METERED RESPIRATORY (INHALATION) at 08:25

## 2018-08-12 RX ADMIN — FLUTICASONE PROPIONATE 1 SPRAY: 50 SPRAY, METERED NASAL at 08:25

## 2018-08-12 RX ADMIN — ALBUTEROL SULFATE 1 PUFF: 90 AEROSOL, METERED RESPIRATORY (INHALATION) at 20:26

## 2018-08-12 RX ADMIN — Medication 400 MG: at 17:24

## 2018-08-12 RX ADMIN — METOPROLOL TARTRATE 12.5 MG: 25 TABLET ORAL at 08:22

## 2018-08-12 RX ADMIN — FERROUS SULFATE TAB 325 MG (65 MG ELEMENTAL FE) 325 MG: 325 (65 FE) TAB at 08:23

## 2018-08-12 RX ADMIN — HYDROCODONE BITARTRATE AND ACETAMINOPHEN 1 TABLET: 5; 325 TABLET ORAL at 04:40

## 2018-08-12 RX ADMIN — HYDROMORPHONE HYDROCHLORIDE 0.2 MG: 1 INJECTION, SOLUTION INTRAMUSCULAR; INTRAVENOUS; SUBCUTANEOUS at 06:13

## 2018-08-12 RX ADMIN — Medication 400 MG: at 08:23

## 2018-08-12 RX ADMIN — RIVAROXABAN 15 MG: 15 TABLET, FILM COATED ORAL at 08:23

## 2018-08-12 RX ADMIN — INSULIN LISPRO 2 UNITS: 100 INJECTION, SOLUTION INTRAVENOUS; SUBCUTANEOUS at 16:53

## 2018-08-12 RX ADMIN — ASPIRIN 81 MG 81 MG: 81 TABLET ORAL at 08:23

## 2018-08-12 RX ADMIN — AMLODIPINE BESYLATE 5 MG: 5 TABLET ORAL at 08:23

## 2018-08-12 RX ADMIN — LEVETIRACETAM 750 MG: 750 TABLET ORAL at 20:21

## 2018-08-12 RX ADMIN — INSULIN LISPRO 4 UNITS: 100 INJECTION, SOLUTION INTRAVENOUS; SUBCUTANEOUS at 21:32

## 2018-08-12 RX ADMIN — FLUTICASONE PROPIONATE 2 PUFF: 110 AEROSOL, METERED RESPIRATORY (INHALATION) at 20:27

## 2018-08-12 RX ADMIN — PANTOPRAZOLE SODIUM 20 MG: 20 TABLET, DELAYED RELEASE ORAL at 05:59

## 2018-08-12 RX ADMIN — INSULIN GLARGINE 22 UNITS: 100 INJECTION, SOLUTION SUBCUTANEOUS at 20:21

## 2018-08-12 RX ADMIN — INSULIN LISPRO 2 UNITS: 100 INJECTION, SOLUTION INTRAVENOUS; SUBCUTANEOUS at 11:26

## 2018-08-12 RX ADMIN — METOCLOPRAMIDE HYDROCHLORIDE 5 MG: 10 TABLET ORAL at 07:27

## 2018-08-12 RX ADMIN — HYDROCODONE BITARTRATE AND ACETAMINOPHEN 1 TABLET: 5; 325 TABLET ORAL at 10:55

## 2018-08-12 RX ADMIN — HYDROCODONE BITARTRATE AND ACETAMINOPHEN 1 TABLET: 5; 325 TABLET ORAL at 15:11

## 2018-08-12 RX ADMIN — CEFAZOLIN SODIUM 1000 MG: 1 SOLUTION INTRAVENOUS at 14:42

## 2018-08-12 RX ADMIN — AMIODARONE HYDROCHLORIDE 200 MG: 200 TABLET ORAL at 08:22

## 2018-08-12 RX ADMIN — INSULIN GLARGINE 22 UNITS: 100 INJECTION, SOLUTION SUBCUTANEOUS at 08:31

## 2018-08-12 RX ADMIN — ALBUTEROL SULFATE 1 PUFF: 90 AEROSOL, METERED RESPIRATORY (INHALATION) at 20:24

## 2018-08-12 RX ADMIN — LEVETIRACETAM 750 MG: 750 TABLET ORAL at 08:23

## 2018-08-12 NOTE — PROGRESS NOTES
IM Residency Progress Note   Unit/Bed#: -01 Encounter: [id number]  SOD Team C       Germania Box [de-identified] y o  female [id number]    Hospital Stay Days: 3      Assessment/Plan:    Principal Problem:    Cellulitis  Active Problems:    Essential hypertension    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 native heart without angina pectoris    Type 2 diabetes mellitus with complication, with long-term current use of insulin (HCC)    Atrial flutter (HCC)    Hyperlipidemia    Gastroesophageal reflux disease without esophagitis    Chronic combined systolic and diastolic congestive heart failure (HCC)    Paroxysmal atrial fibrillation ([facility])    Ulcer of toe of left foot (HCC)    Ulcer of toe of right foot (HCC)    History of CVA (cerebrovascular accident)    Seizure ([address] )    Anemia    PAD (peripheral artery disease) ([facility])    CKD (chronic kidney disease) stage 3, GFR 30-59 ml/min    1  B/L Toe Wounds with Cellulitis of the L foot 2/2 to PVD with dry gangrene- patient with a history of PVD and recurrent cellulitis  S/p Successful angioplasty of the mid right peroneal artery and distal SFA which resulted in complete patency of the right peroneal artery and increased luminal diameter of distal SFA with vascular surgery   - Continue ancef antibiotic day 4 (patient had cephalosporin allergy listed with chest pain as the reaction  However she tolerated IV cefozolin without any issues)  -Wound Care and podiatry consultations  - once able to be discharge will sent home on PO keflex for total 7 day course  - will need close follow up with wound center   - increase norco to q4 H prn, added dilaudid breakthrough   - follow-up repeat ABIs per vascular surgery      2  Combined Diastolic & Systolic Heart Failure   -ROBB on 7/4/2485 showed systolic function was moderately to markedly reduced   Ejection fraction was estimated to be 35 %   -appears euvolemic on exam  -Continue Furosemide 40 mg daily   -Continue Lisinopril 5 mg     3  CAD   -ASA 81 mg   -Atorvastatin 80 mg  -Metoprolol 12 5 mg     4  Paroxysmal Atrial Fibrillation  -NSR  -rates well controlled  -Amiodarone 200 mg  -Xarelto 20 mg daily      5  Type 2 DM  -Metformin and Lantus at home  -Hold Metformin  -increase to Lantus 22 units q12 hours  -Humalog with meals and daily at bedtime      6  GERD  -Pantoprazole 20 mg      7  Microcytic Anemia  -Hgb stable   -Ferrous sulfate every other day      8  Asthma  -Albuterol prn  -Fluticasone 2 puffs q12 hours      9  Seizure Disorder  -Keppra 750 mg q12 hours      10  Hypothyroidism  Levothyroxine 1000 mcg      Disposition:  If okay from vascular surgery following workup, will DC home with outpatient follow-up and oral abx       Subjective:   Patient seen examined at bedside  Noted to have a lot of pain in left foot last night requiring extra dose of Dilaudid  Vitals: Temp (24hrs), Av 9 °F (36 6 °C), Min:97 9 °F (36 6 °C), Max:98 °F (36 7 °C)  Current: Temperature: 97 9 °F (36 6 °C)  Vitals:    18 1553 18 2207 18 0006 18 0738   BP: 139/71 157/77 169/73 152/77   BP Location: Left arm  Right arm Right arm   Pulse: 55 56 56 57   Resp: 20  20 20   Temp: 98 °F (36 7 °C)  97 9 °F (36 6 °C) 97 9 °F (36 6 °C)   TempSrc: Oral  Oral Oral   SpO2: 98%  98% 95%   Weight:       Height:        Body mass index is 27 65 kg/m²  I/O last 24 hours: In: 1440 [P O :900; I V :540]  Out: 2550 [Urine:2550]      Physical Exam:   Physical Exam   Constitutional: She is oriented to person, place, and time  She appears well-developed and well-nourished  No distress  HENT:   Head: Normocephalic and atraumatic  Mouth/Throat: Oropharynx is clear and moist  No oropharyngeal exudate  Eyes: Conjunctivae and EOM are normal  Pupils are equal, round, and reactive to light  No scleral icterus  Cardiovascular: Normal rate, regular rhythm and normal heart sounds  No murmur heard    Pulmonary/Chest: Effort normal and breath sounds normal  No respiratory distress  She has no wheezes  She has no rales  Abdominal: Soft  Bowel sounds are normal  She exhibits no distension  There is no tenderness  There is no guarding  Musculoskeletal:   Ulcerations/necrotic toes bilateral, improving area of erythema left foot   Neurological: She is alert and oriented to person, place, and time  Skin: Skin is warm and dry  She is not diaphoretic  Psychiatric: She has a normal mood and affect   Her behavior is normal  Judgment and thought content normal        Invasive Devices     Peripheral Intravenous Line            Peripheral IV 08/10/18 Left Forearm 1 day                          Labs: Labs personally reviewed    Radiology Results: Imaging personally reviewed    Active Meds:   Current Facility-Administered Medications   Medication Dose Route Frequency    acetaminophen (TYLENOL) tablet 650 mg  650 mg Oral Q6H PRN    albuterol (PROVENTIL HFA,VENTOLIN HFA) inhaler 1 puff  1 puff Inhalation BID PRN    amiodarone tablet 200 mg  200 mg Oral Daily With Breakfast    amLODIPine (NORVASC) tablet 5 mg  5 mg Oral Daily    aspirin chewable tablet 81 mg  81 mg Oral Daily    atorvastatin (LIPITOR) tablet 80 mg  80 mg Oral Daily    calcium carbonate (TUMS) chewable tablet 1,000 mg  1,000 mg Oral TID    ceFAZolin (ANCEF) IVPB (premix) 1,000 mg  1,000 mg Intravenous Q8H    diphenhydrAMINE (BENADRYL) tablet 50 mg  50 mg Oral 60 Min Pre-Op    docusate sodium (COLACE) capsule 100 mg  100 mg Oral BID PRN    ferrous sulfate tablet 325 mg  325 mg Oral Every Other Day    fluticasone (FLONASE) 50 mcg/act nasal spray 1 spray  1 spray Nasal Daily    fluticasone (FLOVENT HFA) 110 MCG/ACT inhaler 2 puff  2 puff Inhalation Q12H [facility]    HYDROcodone-acetaminophen (NORCO) 5-325 mg per tablet 1 tablet  1 tablet Oral Q6H PRN    insulin glargine (LANTUS) subcutaneous injection 22 Units 0 22 mL  22 Units Subcutaneous Q12H [facility]    insulin lispro (HumaLOG) 100 units/mL subcutaneous injection 2-12 Units  2-12 Units Subcutaneous 4x Daily (AC & HS)    levETIRAcetam (KEPPRA) tablet 750 mg  750 mg Oral Q12H [facility]    levothyroxine tablet 100 mcg  100 mcg Oral Early Morning    loratadine (CLARITIN) tablet 10 mg  10 mg Oral Daily    magnesium oxide (MAG-OX) tablet 400 mg  400 mg Oral BID    metoclopramide (REGLAN) tablet 5 mg  5 mg Oral Q6H PRN    metoprolol tartrate (LOPRESSOR) partial tablet 12 5 mg  12 5 mg Oral Q12H [facility]    nitroglycerin (NITROSTAT) SL tablet 0 4 mg  0 4 mg Sublingual Q3 min PRN    pantoprazole (PROTONIX) EC tablet 20 mg  20 mg Oral Early Morning    polyethylene glycol (MIRALAX) packet 17 g  17 g Oral Daily PRN    rivaroxaban (XARELTO) tablet 15 mg  15 mg Oral Daily         VTE Pharmacologic Prophylaxis: Reason for no pharmacologic prophylaxis xarelto  VTE Mechanical Prophylaxis: Bilateral SCDs    Robina Jones DO

## 2018-08-12 NOTE — CONSULTS
Consult - Podiatry   Martha Li [de-identified] y o  female MRN: [id number]  Unit/Bed#: -01 Encounter: [id number]    Assessment/Plan     Assessment:  1  Right 3rd digit wound with exposed distal phalanx, Right medial 1st metatarsal head wound   2  Left medial hallux wound, left 2nd dorsal digit eschar   3  DM 2  4  PAD  5  CKD, stage 3     Plan:  -right 3rd digit wound and left 2nd dorsal digit eschar was dressed with a Betadine and DSD  -left medial 1st metatarsal head and and left medial hallux wounds dressed with Maxorb and DSD  -x-rays pending to rule out any osseous abnormality  -bilateral foot wounds cultures pending  -LEADs pending  -will discuss plan with attending    History of Present Illness     HPI:  Martha Li is a [de-identified] y o  female who presents with b/l foot wounds  Patient states her left big toe wound is painful and rates 10/10 pain  She states she had these wounds for a while now  Patient states its been couple months she visited her foot doctor as an out patient who takes care of her nails  However she saw Dr Jesica Victor in wound care center a week ago  Patient denies any other complaints of nausea vomiting fever chills  Inpatient consult to Podiatry     Performed by  Pattie Rosales by Bhavin Murdock       Consent: Verbal consent obtained  Review of Systems   Constitutional: Negative  HENT: Negative  Eyes: Negative  Respiratory: Negative  Cardiovascular: Negative  Gastrointestinal: Negative  Musculoskeletal:   Negative  Skin:  Bilateral foot wounds   Neurological: Negative  Psych: negative         Historical Information   Past Medical History:   Diagnosis Date    Altered mental status     Anemia     Anticoagulated     Xarelto for Afib/ flutter    Asthma     Atrial flutter (HCC)     maintained on anticoag w/ Xarelto    Bradycardia     CAD (coronary artery disease)     Candidiasis     Carotid stenosis, bilateral     Cataract     Chest pain     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) (HCC)     Chronic fatigue syndrome     Chronic low back pain     Chronic ulcer of toes (HCC)     left and right    Concussion w loss of consciousness of unsp duration, init     [facility]A [facility]cere[facility] ) 4/17/2018    Diabetes [facility]     type 2, insulin dependent    DJD (degenerative joint disease)     Expressive aphasia     Foot pain     GERD (gastroesophageal reflux disease)     Herpes zoster     HLD (hyperlipidemia)     HTN (hypertension)     Hypothyroidism     Irritable bowel syndrome     Kidney stone     Lumbar radiculopathy     Lyme disease     Dx in hospital 8/2011    MI (myocardial infarction) [facility] )     Migraines     Muscle spasm     Non-neoplastic nevus     Nontoxic multinodular goiter     NSVT (nonsustained ventricular tachycardia) (HCC)     Osteoarthritis     Other chronic pain     PAD (peripheral artery disease) (HCC)     Palpitations     Paroxysmal atrial fibrillation (HCC)     Pseudogout     Pulmonary hypertension (HCC)     S/P TAVR (transcatheter aortic valve replacement)     [facility]ures [facility])     Severe sepsis [facility]     Spinal stenosis     Stroke ([facility] ) 05/2018    Transient cerebral ischemia     Trigger ring finger     Viral gastroenteritis      Past Surgical History:   Procedure Laterality Date    APPENDECTOMY      ARTERIOGRAM  12/19/2017    CARDIAC CATHETERIZATION      CHOLECYSTECTOMY      COLONOSCOPY      CORONARY ARTERY BYPASS GRAFT  2004    IR ABDOMINAL ANGIOGRAPHY / INTERVENTION  8/10/2018    LUMBAR LAMINECTOMY      ID ECHO TRANSESOPHAG R-T 2D W/PRB IMG ACQUISJ I&R N/A 4/3/2018    Procedure: TRANSESOPHAGEAL ECHOCARDIOGRAM (ROBB);   Surgeon: Anibal Amezcua DO;  [facility]: [facility];  Service: Cardiac Surgery    ID REPLACE AORTIC VALVE OPENFEMORAL ARTERY APPROACH N/A 4/3/2018    Procedure: REPLACEMENT AORTIC VALVE TRANSCATHETER (TAVR) TRANSFEMORAL w/ 26MM IVY YEVGENIY S3 VALVE;  Surgeon: Avery Romero DO;  Location: [facility];  Service: Cardiac Surgery    THYROIDECTOMY      TOTAL ABDOMINAL HYSTERECTOMY W/ BILATERAL SALPINGOOPHORECTOMY       Social History   History   Alcohol Use No     History   Drug Use No     History   Smoking Status    Never Smoker   Smokeless Tobacco    Never Used     Family History:   Family History   Problem Relation Age of Onset    Cancer Mother     Stroke Mother     Cancer Father     Heart disease Father     Breast cancer Sister     Diabetes Daughter         Passed away January 2017        Meds/Allergies   Prescriptions Prior to Admission   Medication    amiodarone 200 mg tablet    amLODIPine (NORVASC) 5 mg tablet    aspirin 81 MG tablet    atorvastatin (LIPITOR) 80 mg tablet    Cholecalciferol (VITAMIN D3) 1000 units CHEW    esomeprazole (NEXIUM) 20 mg capsule    ferrous sulfate 325 (65 Fe) mg tablet    fluticasone (FLONASE) 50 mcg/act nasal spray    furosemide (LASIX) 40 mg tablet    glucose blood test strip    Insulin Syringe-Needle U-100 (BD INSULIN SYRINGE ULTRAFINE) 31G X 5/16" 1 ML MISC    LANTUS 100 UNIT/ML subcutaneous injection    levETIRAcetam (KEPPRA) 750 mg tablet    levothyroxine 100 mcg tablet    lisinopril (ZESTRIL) 5 mg tablet    loratadine (CLARITIN) 10 mg tablet    magnesium oxide (MAG-OX) 400 mg    metFORMIN (GLUCOPHAGE) 1000 MG tablet    metoclopramide (REGLAN) 10 mg tablet    Mometasone Furoate (ASMANEX HFA) 100 MCG/ACT AERO    pantoprazole (PROTONIX) 40 mg tablet    potassium chloride (K-DUR,KLOR-CON) 10 mEq tablet    rivaroxaban (XARELTO) 20 mg tablet    acetaminophen (TYLENOL) 650 mg CR tablet    albuterol (VENTOLIN HFA) 90 mcg/act inhaler    calcium carbonate (TUMS) 500 mg chewable tablet    cholecalciferol (VITAMIN D3) 1,000 units tablet    diphenhydrAMINE (BENADRYL) 50 mg capsule    methylPREDNISolone (MEDROL) 32 MG tablet    nitroglycerin (NITROSTAT) 0 4 mg SL tablet    phenazopyridine (PYRIDIUM) 200 mg tablet    polyethylene glycol (MIRALAX) 17 g packet     Allergies   Allergen Reactions    Other Chest Pain     IVP-listed as "chest pain" in previous chart, patient stated this occurred "a long time ago" but does not remember exactly occurred     Cephalosporins Chest Pain    Contrast [Iodinated Diagnostic Agents] Other (See Comments)     Flash pulm edema    Doxycycline Chest Pain    Ketorolac Other (See Comments)     Chest pain     Levaquin [Levofloxacin] Chest Pain    Ondansetron Chest Pain     Prolonged QT    Toradol [Ketorolac Tromethamine] Chest Pain       Objective   First Vitals:   Blood Pressure: (!) 177/71 (08/08/18 1544)  Pulse: 58 (08/08/18 1544)  Temperature: 97 8 °F (36 6 °C) (08/08/18 1544)  Temp Source: Oral (08/08/18 1544)  Respirations: 20 (08/08/18 1544)  Height: 5' 4" (162 6 cm) (08/08/18 1923)  Weight - Scale: 73 1 kg (161 lb 1 6 oz) (08/08/18 1923)  SpO2: 96 % (08/08/18 1544)    Current Vitals:   Blood Pressure: 152/77 (08/12/18 0738)  Pulse: 57 (08/12/18 0738)  Temperature: 97 9 °F (36 6 °C) (08/12/18 0738)  Temp Source: Oral (08/12/18 0738)  Respirations: 20 (08/12/18 0738)  Height: 5' 4" (162 6 cm) (08/10/18 1923)  Weight - Scale: 73 1 kg (161 lb 1 6 oz) (08/08/18 1923)  SpO2: 95 % (08/12/18 0738)        /77 (BP Location: Right arm)   Pulse 57   Temp 97 9 °F (36 6 °C) (Oral)   Resp 20   Ht 5' 4" (1 626 m)   Wt 73 1 kg (161 lb 1 6 oz)   LMP  (LMP Unknown)   SpO2 95%   BMI 27 65 kg/m²      General Appearance:    Alert, cooperative, no distress   Head:    Normocephalic, without obvious abnormality, atraumatic   Eyes:    PERRL, conjunctiva/corneas clear, EOM's intact        Nose:   Moist mucous membranes   Neck:   Supple, symmetrical, trachea midline   Back:     Symmetric   Lungs:     Respirations unlabored   Heart:    Regular rate and rhythm, S1 and S2 normal, no murmur, rub   or gallop   Abdomen:     Soft, non-tender Extremities:   Lower extremity ROM with in normal limits, No gross deformity noted    Pulses:   Non-palpable b/l pulses however dopplerable  Skin:   Left medial hallux wound with purulent drainage measuring [id number] 3cm  Right 2nd digit eschar dorsally appears stable  Left 3rd digit with exposed proximal phalanx  Right medial 1st met wound with purulent drainage measuring [id number] 2cm  Neurologic:   Gross sensation is intact  Protective sensation is absent  Left foot 8/12/18    left foot 8/12/18    Right foot 8/12/18    Right foot 8/12/18          Lab Results:   Admission on 08/08/2018   Component Date Value    WBC 08/08/2018 10 60*    RBC 08/08/2018 4 64     Hemoglobin 08/08/2018 10 8*    Hematocrit 08/08/2018 35 2     MCV 08/08/2018 76*    MCH 08/08/2018 23 3*    MCHC 08/08/2018 30 7*    RDW 08/08/2018 18 3*    MPV 08/08/2018 10 6     Platelets 10/31/0477 242     nRBC 08/08/2018 0     Neutrophils Relative 08/08/2018 72     Immat GRANS % 08/08/2018 1     Lymphocytes Relative 08/08/2018 15     Monocytes Relative 08/08/2018 10     Eosinophils Relative 08/08/2018 2     Basophils Relative 08/08/2018 0     Neutrophils Absolute 08/08/2018 7 71*    Immature Grans Absolute 08/08/2018 0 05     Lymphocytes Absolute 08/08/2018 1 58     Monocytes Absolute 08/08/2018 1 02     Eosinophils Absolute 08/08/2018 0 20     Basophils Absolute 08/08/2018 0 04     Sodium 08/08/2018 136     Potassium 08/08/2018 4 3     Chloride 08/08/2018 101     CO2 08/08/2018 26     Anion Gap 08/08/2018 9     BUN 08/08/2018 23     Creatinine 08/08/2018 1 15     Glucose 08/08/2018 135     Calcium 08/08/2018 8 8     AST 08/08/2018 22     ALT 08/08/2018 27     Alkaline Phosphatase 08/08/2018 58     Total Protein 08/08/2018 8 3*    Albumin 08/08/2018 3 4*    Total Bilirubin 08/08/2018 0 55     eGFR 08/08/2018 45     Blood Culture 08/08/2018 No Growth at 72 hrs       Blood Culture 08/08/2018 No Growth at 72 hrs       Protime 08/09/2018 15 4*    INR 08/09/2018 1 21*    POC Glucose 08/09/2018 191*    Sodium 08/09/2018 137     Potassium 08/09/2018 4 0     Chloride 08/09/2018 103     CO2 08/09/2018 27     Anion Gap 08/09/2018 7     BUN 08/09/2018 19     Creatinine 08/09/2018 1 06     Glucose 08/09/2018 111     Calcium 08/09/2018 7 9*    eGFR 08/09/2018 50     WBC 08/09/2018 7 99     RBC 08/09/2018 4 22     Hemoglobin 08/09/2018 9 8*    Hematocrit 08/09/2018 32 4*    MCV 08/09/2018 77*    MCH 08/09/2018 23 2*    MCHC 08/09/2018 30 2*    RDW 08/09/2018 18 1*    MPV 08/09/2018 10 8     Platelets 43/80/5040 214     nRBC 08/09/2018 0     Neutrophils Relative 08/09/2018 70     Immat GRANS % 08/09/2018 0     Lymphocytes Relative 08/09/2018 14     Monocytes Relative 08/09/2018 11     Eosinophils Relative 08/09/2018 4     Basophils Relative 08/09/2018 1     Neutrophils Absolute 08/09/2018 5 56     Immature Grans Absolute 08/09/2018 0 03     Lymphocytes Absolute 08/09/2018 1 14     Monocytes Absolute 08/09/2018 0 85     Eosinophils Absolute 08/09/2018 0 35     Basophils Absolute 08/09/2018 0 06     POC Glucose 08/09/2018 113     POC Glucose 08/08/2018 110     Ventricular Rate 08/08/2018 59     Atrial Rate 08/08/2018 59     SC Interval 08/08/2018 322     QRSD Interval 08/08/2018 164     QT Interval 08/08/2018 554     QTC Interval 08/08/2018 548     P Axis 08/08/2018 42     QRS [place] 08/08/2018 -64     T Wave Axis 08/08/2018 95     POC Glucose 08/09/2018 185*    POC Glucose 08/09/2018 426*    POC Glucose 08/09/2018 460*    POC Glucose 08/09/2018 497*    WBC 08/10/2018 10 24*    RBC 08/10/2018 4 15     Hemoglobin 08/10/2018 9 9*    Hematocrit 08/10/2018 31 7*    MCV 08/10/2018 76*    MCH 08/10/2018 23 9*    MCHC 08/10/2018 31 2*    RDW 08/10/2018 17 6*    Platelets 30/24/6219 226     MPV 08/10/2018 11 1     Sodium 08/10/2018 136     Potassium 08/10/2018 4 1     Chloride 08/10/2018 102     CO2 08/10/2018 27     Anion Gap 08/10/2018 7     BUN 08/10/2018 19     Creatinine 08/10/2018 0 94     Glucose 08/10/2018 315*    Calcium 08/10/2018 8 3     eGFR 08/10/2018 57     Protime 08/10/2018 15 3*    INR 08/10/2018 1 20*    POC Glucose 08/10/2018 306*    POC Glucose 08/10/2018 222*    POC Glucose 08/10/2018 209*    POC Glucose 08/10/2018 410*    POC Glucose 08/11/2018 196*    POC Glucose 08/11/2018 224*    POC Glucose 08/11/2018 208*    POC Glucose 08/11/2018 204*    Sodium 08/12/2018 139     Potassium 08/12/2018 4 1     Chloride 08/12/2018 106     CO2 08/12/2018 28     Anion Gap 08/12/2018 5     BUN 08/12/2018 14     Creatinine 08/12/2018 0 73     Glucose 08/12/2018 82     Calcium 08/12/2018 8 0*    eGFR 08/12/2018 78     WBC 08/12/2018 11 02*    RBC 08/12/2018 4 14     Hemoglobin 08/12/2018 9 8*    Hematocrit 08/12/2018 32 1*    MCV 08/12/2018 78*    MCH 08/12/2018 23 7*    MCHC 08/12/2018 30 5*    RDW 08/12/2018 18 0*    MPV 08/12/2018 10 5     Platelets 31/97/5898 235     nRBC 08/12/2018 0     Neutrophils Relative 08/12/2018 66     Immat GRANS % 08/12/2018 1     Lymphocytes Relative 08/12/2018 21     Monocytes Relative 08/12/2018 10     Eosinophils Relative 08/12/2018 2     Basophils Relative 08/12/2018 0     Neutrophils Absolute 08/12/2018 7 25     Immature Grans Absolute 08/12/2018 0 06     Lymphocytes Absolute 08/12/2018 2 33     Monocytes Absolute 08/12/2018 1 13     Eosinophils Absolute 08/12/2018 0 23     Basophils Absolute 08/12/2018 0 02     POC Glucose 08/12/2018 100     POC Glucose 08/12/2018 190*               Results from last 7 days  Lab Units 08/08/18  1641   BLOOD CULTURE  No Growth at 72 hrs  No Growth at 72 hrs  Invalid input(s): LABAEARO            Imaging: I have personally reviewed pertinent films in PACS  EKG, Pathology, and Other Studies: I have personally reviewed pertinent reports        Code Status: Level 1 - Full Code  Advance Directive and Living Will:      Power of :    POLST:

## 2018-08-13 LAB
ANION GAP SERPL CALCULATED.3IONS-SCNC: 6 MMOL/L (ref 4–13)
BACTERIA BLD CULT: NORMAL
BACTERIA BLD CULT: NORMAL
BASOPHILS # BLD AUTO: 0.03 THOUSANDS/ΜL (ref 0–0.1)
BASOPHILS NFR BLD AUTO: 0 % (ref 0–1)
BUN SERPL-MCNC: 12 MG/DL (ref 5–25)
CALCIUM SERPL-MCNC: 8.1 MG/DL (ref 8.3–10.1)
CHLORIDE SERPL-SCNC: 104 MMOL/L (ref 100–108)
CO2 SERPL-SCNC: 27 MMOL/L (ref 21–32)
CREAT SERPL-MCNC: 0.68 MG/DL (ref 0.6–1.3)
EOSINOPHIL # BLD AUTO: 0.29 THOUSAND/ΜL (ref 0–0.61)
EOSINOPHIL NFR BLD AUTO: 3 % (ref 0–6)
ERYTHROCYTE [DISTWIDTH] IN BLOOD BY AUTOMATED COUNT: 18 % (ref 11.6–15.1)
GFR SERPL CREATININE-BSD FRML MDRD: 83 ML/MIN/1.73SQ M
GLUCOSE SERPL-MCNC: 159 MG/DL (ref 65–140)
GLUCOSE SERPL-MCNC: 185 MG/DL (ref 65–140)
GLUCOSE SERPL-MCNC: 262 MG/DL (ref 65–140)
GLUCOSE SERPL-MCNC: 81 MG/DL (ref 65–140)
GLUCOSE SERPL-MCNC: 91 MG/DL (ref 65–140)
HCT VFR BLD AUTO: 32.9 % (ref 34.8–46.1)
HGB BLD-MCNC: 10 G/DL (ref 11.5–15.4)
IMM GRANULOCYTES # BLD AUTO: 0.08 THOUSAND/UL (ref 0–0.2)
IMM GRANULOCYTES NFR BLD AUTO: 1 % (ref 0–2)
LYMPHOCYTES # BLD AUTO: 1.55 THOUSANDS/ΜL (ref 0.6–4.47)
LYMPHOCYTES NFR BLD AUTO: 15 % (ref 14–44)
MCH RBC QN AUTO: 23.4 PG (ref 26.8–34.3)
MCHC RBC AUTO-ENTMCNC: 30.4 G/DL (ref 31.4–37.4)
MCV RBC AUTO: 77 FL (ref 82–98)
MONOCYTES # BLD AUTO: 1.02 THOUSAND/ΜL (ref 0.17–1.22)
MONOCYTES NFR BLD AUTO: 10 % (ref 4–12)
NEUTROPHILS # BLD AUTO: 7.15 THOUSANDS/ΜL (ref 1.85–7.62)
NEUTS SEG NFR BLD AUTO: 71 % (ref 43–75)
NRBC BLD AUTO-RTO: 0 /100 WBCS
PLATELET # BLD AUTO: 239 THOUSANDS/UL (ref 149–390)
PMV BLD AUTO: 10.8 FL (ref 8.9–12.7)
POTASSIUM SERPL-SCNC: 3.7 MMOL/L (ref 3.5–5.3)
RBC # BLD AUTO: 4.27 MILLION/UL (ref 3.81–5.12)
SODIUM SERPL-SCNC: 137 MMOL/L (ref 136–145)
WBC # BLD AUTO: 10.12 THOUSAND/UL (ref 4.31–10.16)

## 2018-08-13 PROCEDURE — 82948 REAGENT STRIP/BLOOD GLUCOSE: CPT

## 2018-08-13 PROCEDURE — 99233 SBSQ HOSP IP/OBS HIGH 50: CPT | Performed by: INTERNAL MEDICINE

## 2018-08-13 PROCEDURE — 85025 COMPLETE CBC W/AUTO DIFF WBC: CPT | Performed by: INTERNAL MEDICINE

## 2018-08-13 PROCEDURE — 80048 BASIC METABOLIC PNL TOTAL CA: CPT | Performed by: INTERNAL MEDICINE

## 2018-08-13 RX ADMIN — HYDROMORPHONE HYDROCHLORIDE 0.2 MG: 1 INJECTION, SOLUTION INTRAMUSCULAR; INTRAVENOUS; SUBCUTANEOUS at 08:36

## 2018-08-13 RX ADMIN — HYDROMORPHONE HYDROCHLORIDE 0.5 MG: 1 INJECTION, SOLUTION INTRAMUSCULAR; INTRAVENOUS; SUBCUTANEOUS at 11:33

## 2018-08-13 RX ADMIN — METOPROLOL TARTRATE 12.5 MG: 25 TABLET ORAL at 21:23

## 2018-08-13 RX ADMIN — LEVETIRACETAM 750 MG: 750 TABLET ORAL at 21:19

## 2018-08-13 RX ADMIN — ALBUTEROL SULFATE 1 PUFF: 90 AEROSOL, METERED RESPIRATORY (INHALATION) at 21:19

## 2018-08-13 RX ADMIN — INSULIN GLARGINE 22 UNITS: 100 INJECTION, SOLUTION SUBCUTANEOUS at 08:34

## 2018-08-13 RX ADMIN — HYDROCODONE BITARTRATE AND ACETAMINOPHEN 1 TABLET: 5; 325 TABLET ORAL at 10:38

## 2018-08-13 RX ADMIN — LEVOTHYROXINE SODIUM 100 MCG: 100 TABLET ORAL at 05:46

## 2018-08-13 RX ADMIN — INSULIN GLARGINE 22 UNITS: 100 INJECTION, SOLUTION SUBCUTANEOUS at 21:19

## 2018-08-13 RX ADMIN — AMIODARONE HYDROCHLORIDE 200 MG: 200 TABLET ORAL at 08:41

## 2018-08-13 RX ADMIN — HYDROMORPHONE HYDROCHLORIDE 0.5 MG: 1 INJECTION, SOLUTION INTRAMUSCULAR; INTRAVENOUS; SUBCUTANEOUS at 17:52

## 2018-08-13 RX ADMIN — CEFAZOLIN SODIUM 1000 MG: 1 SOLUTION INTRAVENOUS at 05:47

## 2018-08-13 RX ADMIN — INSULIN LISPRO 2 UNITS: 100 INJECTION, SOLUTION INTRAVENOUS; SUBCUTANEOUS at 11:37

## 2018-08-13 RX ADMIN — LEVETIRACETAM 750 MG: 750 TABLET ORAL at 08:41

## 2018-08-13 RX ADMIN — LORATADINE 10 MG: 10 TABLET ORAL at 08:41

## 2018-08-13 RX ADMIN — RIVAROXABAN 15 MG: 15 TABLET, FILM COATED ORAL at 08:42

## 2018-08-13 RX ADMIN — CEFAZOLIN SODIUM 1000 MG: 1 SOLUTION INTRAVENOUS at 21:49

## 2018-08-13 RX ADMIN — Medication 400 MG: at 17:44

## 2018-08-13 RX ADMIN — AMLODIPINE BESYLATE 5 MG: 5 TABLET ORAL at 08:42

## 2018-08-13 RX ADMIN — HYDROMORPHONE HYDROCHLORIDE 0.2 MG: 1 INJECTION, SOLUTION INTRAMUSCULAR; INTRAVENOUS; SUBCUTANEOUS at 03:01

## 2018-08-13 RX ADMIN — Medication 1000 MG: at 08:41

## 2018-08-13 RX ADMIN — ALBUTEROL SULFATE 1 PUFF: 90 AEROSOL, METERED RESPIRATORY (INHALATION) at 08:51

## 2018-08-13 RX ADMIN — INSULIN LISPRO 6 UNITS: 100 INJECTION, SOLUTION INTRAVENOUS; SUBCUTANEOUS at 21:20

## 2018-08-13 RX ADMIN — ASPIRIN 81 MG 81 MG: 81 TABLET ORAL at 08:42

## 2018-08-13 RX ADMIN — FLUTICASONE PROPIONATE 2 PUFF: 110 AEROSOL, METERED RESPIRATORY (INHALATION) at 08:52

## 2018-08-13 RX ADMIN — CEFAZOLIN SODIUM 1000 MG: 1 SOLUTION INTRAVENOUS at 16:08

## 2018-08-13 RX ADMIN — HYDROCODONE BITARTRATE AND ACETAMINOPHEN 1 TABLET: 5; 325 TABLET ORAL at 05:46

## 2018-08-13 RX ADMIN — FLUTICASONE PROPIONATE 1 SPRAY: 50 SPRAY, METERED NASAL at 08:51

## 2018-08-13 RX ADMIN — INSULIN LISPRO 2 UNITS: 100 INJECTION, SOLUTION INTRAVENOUS; SUBCUTANEOUS at 17:45

## 2018-08-13 RX ADMIN — HYDROCODONE BITARTRATE AND ACETAMINOPHEN 1 TABLET: 5; 325 TABLET ORAL at 20:51

## 2018-08-13 RX ADMIN — ATORVASTATIN CALCIUM 80 MG: 80 TABLET, FILM COATED ORAL at 08:41

## 2018-08-13 RX ADMIN — METOPROLOL TARTRATE 12.5 MG: 25 TABLET ORAL at 08:41

## 2018-08-13 RX ADMIN — HYDROMORPHONE HYDROCHLORIDE 0.5 MG: 1 INJECTION, SOLUTION INTRAMUSCULAR; INTRAVENOUS; SUBCUTANEOUS at 23:31

## 2018-08-13 RX ADMIN — PANTOPRAZOLE SODIUM 20 MG: 20 TABLET, DELAYED RELEASE ORAL at 05:46

## 2018-08-13 RX ADMIN — Medication 400 MG: at 08:41

## 2018-08-13 RX ADMIN — FLUTICASONE PROPIONATE 2 PUFF: 110 AEROSOL, METERED RESPIRATORY (INHALATION) at 21:21

## 2018-08-13 RX ADMIN — HYDROCODONE BITARTRATE AND ACETAMINOPHEN 1 TABLET: 5; 325 TABLET ORAL at 16:07

## 2018-08-13 NOTE — PROGRESS NOTES
Patient complains of left leg pain  Denies right leg pain  Vitals:    08/13/18 0057   BP: 152/83   Pulse: 56   Resp: 20   Temp: 98 6 °F (37 °C)   SpO2: 95%     Gen: NAD, lying in bed  Ext: Bilateral foot wounds covered with dressing  Left groin puncture site clean and dry, no hematoma  A:  [de-identified]year old female with PAD, dry gangrene of bilateral toes presented with left foot edema and erythema  LLE angiogram was performed on 12/19/2017 which showed severe 3 vessel tibial disease  RLE angiogram on 8/10/2018 showed severe three-vessel tibial disease s/p recanalization and angioplasty of right peroneal artery and angioplasty of distal right SFA      P:  - Foot wound cares per Podiatry  - Pain meds prn

## 2018-08-13 NOTE — CASE MANAGEMENT
Continued Stay Review    Date: 08/13/2018    Vital Signs: /88 (BP Location: Left arm)   Pulse 58   Temp 97 9 °F (36 6 °C) (Oral)   Resp 20   Ht 5' 4" (1 626 m)   Wt 73 1 kg (161 lb 1 6 oz)   LMP  (LMP Unknown)   SpO2 97%   BMI 27 65 kg/m²     Medications:   Scheduled Meds:   Current Facility-Administered Medications:  acetaminophen 650 mg Oral Q6H PRN    albuterol 1 puff Inhalation BID PRN    amiodarone 200 mg Oral Daily With Breakfast    amLODIPine 5 mg Oral Daily    aspirin 81 mg Oral Daily    atorvastatin 80 mg Oral Daily    calcium carbonate 1,000 mg Oral TID    cefazolin 1,000 mg Intravenous Q8H Last Rate: Stopped (08/13/18 0901)   diphenhydrAMINE 50 mg Oral 60 Min Pre-Op    docusate sodium 100 mg Oral BID PRN    ferrous sulfate 325 mg Oral Every Other Day    fluticasone 1 spray Nasal Daily    fluticasone 2 puff Inhalation Q12H [address]    HYDROcodone-acetaminophen 1 tablet Oral Q4H PRN    HYDROmorphone 0 2 mg Intravenous Q4H PRN    insulin glargine 22 Units Subcutaneous Q12H LANCE    insulin lispro 2-12 Units Subcutaneous 4x Daily (AC & HS)    levETIRAcetam 750 mg Oral Q12H [address]    levothyroxine 100 mcg Oral Early Morning    loratadine 10 mg Oral Daily    magnesium oxide 400 mg Oral BID    metoclopramide 5 mg Oral Q6H PRN    metoprolol tartrate 12 5 mg Oral Q12H LANCE    nitroglycerin 0 4 mg Sublingual Q3 min PRN    pantoprazole 20 mg Oral Early Morning    polyethylene glycol 17 g Oral Daily PRN    rivaroxaban 15 mg Oral Daily      Abnormal Labs/Diagnostic Results:     Age/Sex: [de-identified] y o  female     Assessment/Plan: 0570, 8623  Cellulitis  Active Problems:    Essential hypertension    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 native heart without angina pectoris    Type 2 diabetes mellitus with complication, with long-term current use of insulin (HCC)    Atrial flutter (HCC)    Hyperlipidemia    Gastroesophageal reflux disease without esophagitis    Chronic combined systolic and diastolic congestive heart failure (HCC)    Paroxysmal atrial fibrillation (HCC)    Ulcer of toe of left foot (HCC)    Ulcer of toe of right foot (HCC)    History of CVA (cerebrovascular accident)    Seizure ([address] )    Anemia    PAD (peripheral artery disease) (HCC)    CKD (chronic kidney disease) stage 3, GFR 30-59 ml/min     1  B/L Toe Wounds with Cellulitis of the L foot 2/2 to PVD with 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  S/p Successful angioplasty of the mid right peroneal artery and distal SFA which resulted in complete patency of the right peroneal artery and increased luminal diameter of distal SFA with vascular surgery   - Continue ancef antibiotic day 5 (patient had cephalosporin allergy listed with chest pain as the reaction   However she tolerated IV cefozolin without any issues)  -Wound Care and podiatry consultations  - pain control with norco to q4 H prn, added dilaudid breakthrough   - follow-up repeat ABIs per vascular surgery  - podiatry is onboard and await recommendations for R toe osteomyelitis     2  Combined Diastolic & Systolic Heart Failure   -ROBB on 2/3/6771 showed systolic function was moderately to markedly reduced  Ejection fraction was estimated to be 35 %   -appears euvolemic on exam  -Continue Furosemide 40 mg daily   -Continue Lisinopril 5 mg     3  CAD   -ASA 81 mg   -Atorvastatin 80 mg  -Metoprolol 12 5 mg     4  Paroxysmal Atrial Fibrillation  -NSR  -rates well controlled  -Amiodarone 200 mg  -Xarelto 20 mg daily      5  Type 2 DM  -Metformin and Lantus at home  -Hold Metformin  -increase to Lantus 22 units q12 hours  -Humalog with meals and daily at bedtime      6  GERD  -Pantoprazole 20 mg      7  Microcytic Anemia  -Hgb stable   -Ferrous sulfate every other day      8  Asthma  -Albuterol prn  -Fluticasone 2 puffs q12 hours      9  Seizure Disorder  -Keppra 750 mg q12 hours      10  Hypothyroidism  Levothyroxine 1000 mcg      Disposition: Continue inpatient care    Podiatry to make recommendations for 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 seen on XR        Discharge Plan: TBD

## 2018-08-13 NOTE — PROGRESS NOTES
IM Residency Progress Note   Unit/Bed#: -01 Encounter: [id number]  SOD Team C       Salvador Shah [de-identified] y o  female [id number]    Hospital Stay Days: 4      Assessment/Plan:    Principal Problem:    Cellulitis  Active Problems:    Essential hypertension    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 native heart without angina pectoris    Type 2 diabetes mellitus with complication, with long-term current use of insulin ([facility])    Atrial flutter ([facility])    Hyperlipidemia    Gastroesophageal reflux disease without esophagitis    Chronic combined systolic and diastolic congestive heart failure ([facility])    Paroxysmal atrial fibrillation ([facility])    Ulcer of toe of left foot (HCC)    Ulcer of toe of right foot ([facility])    History of CVA (cerebrovascular accident)    Seizure ([facility] [address] )    Anemia    PAD (peripheral artery disease) ([facility])    CKD (chronic kidney disease) stage 3, GFR 30-59 ml/min    1  B/L Toe Wounds with Cellulitis of the L foot 2/2 to PVD with 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  S/p Successful angioplasty of the mid right peroneal artery and distal SFA which resulted in complete patency of the right peroneal artery and increased luminal diameter of distal SFA with vascular surgery   - Continue ancef antibiotic day 5 (patient had cephalosporin allergy listed with chest pain as the reaction   However she tolerated IV cefozolin without any issues)  -Wound Care and podiatry consultations  - pain control with norco to q4 H prn, added dilaudid breakthrough   - follow-up repeat ABIs per vascular surgery  - podiatry is onboard and await recommendations for R toe osteomyelitis     2  Combined Diastolic & Systolic Heart Failure   -ROBB on 0/3/7455 showed systolic function was moderately to markedly reduced  Ejection fraction was estimated to be 35 %   -appears euvolemic on exam  -Continue Furosemide 40 mg daily   -Continue Lisinopril 5 mg     3  CAD   -ASA 81 mg   -Atorvastatin 80 mg  -Metoprolol 12 5 mg     4  Paroxysmal Atrial Fibrillation  -NSR  -rates well controlled  -Amiodarone 200 mg  -Xarelto 20 mg daily      5  Type 2 DM  -Metformin and Lantus at home  -Hold Metformin  -increase to Lantus 22 units q12 hours  -Humalog with meals and daily at bedtime      6  GERD  -Pantoprazole 20 mg      7  Microcytic Anemia  -Hgb stable   -Ferrous sulfate every other day      8  Asthma  -Albuterol prn  -Fluticasone 2 puffs q12 hours      9  Seizure Disorder  -Keppra 750 mg q12 hours      10  Hypothyroidism  Levothyroxine 1000 mcg     Disposition: Continue inpatient care  Podiatry to make recommendations for 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 seen on XR  Subjective:   Patient is having severe pain in the R foot  She denied any fevers or chills overnight  No new concerns  No acute events overngiht  Vitals: Temp (24hrs), Av 2 °F (36 8 °C), Min:97 9 °F (36 6 °C), Max:98 6 °F (37 °C)  Current: Temperature: 97 9 °F (36 6 °C)  Vitals:    18 1545 18 2021 18 0057 18 0810   BP: 145/73 (!) 174/80 152/83 169/88   BP Location: Right arm  Left arm Left arm   Pulse: 55 57 56 58   Resp: 20  20 20   Temp: 98 °F (36 7 °C)  98 6 °F (37 °C) 97 9 °F (36 6 °C)   TempSrc: Oral  Oral Oral   SpO2: 97%  95% 97%   Weight:       Height:        Body mass index is 27 65 kg/m²  I/O last 24 hours: In: 1140 [P O :1140]  Out: 3150 [Urine:3150]      Physical Exam   Constitutional: She is oriented to person, place, and time  She appears well-developed and well-nourished  No distress  HENT:   Head: Normocephalic and atraumatic  Eyes: EOM are normal    Neck: No tracheal deviation present  Cardiovascular: Normal rate and regular rhythm  No murmur heard  Pulmonary/Chest: Effort normal and breath sounds normal  No stridor  She has no wheezes  Abdominal: Soft  Bowel sounds are normal  She exhibits no distension  Neurological: She is alert and oriented to person, place, and time  Skin: Skin is warm and dry     BL foot bandages in place   Psychiatric: She has a normal mood and affect  Her behavior is normal    Nursing note and vitals reviewed          Invasive Devices     Peripheral Intravenous Line            Peripheral IV 08/10/18 Left Forearm 2 days                          Labs:   Recent Results (from the past 24 hour(s))   Fingerstick Glucose (POCT)    Collection Time: 08/12/18  3:44 PM   Result Value Ref Range    POC Glucose 152 (H) 65 - 140 mg/dl   Fingerstick Glucose (POCT)    Collection Time: 08/12/18  9:28 PM   Result Value Ref Range    POC Glucose 217 (H) 65 - 140 mg/dl   CBC and differential    Collection Time: 08/13/18  5:31 AM   Result Value Ref Range    WBC 10 12 4 31 - 10 16 Thousand/uL    RBC 4 27 3 81 - 5 12 Million/uL    Hemoglobin 10 0 (L) 11 5 - 15 4 g/dL    Hematocrit 32 9 (L) 34 8 - 46 1 %    MCV 77 (L) 82 - 98 fL    MCH 23 4 (L) 26 8 - 34 3 pg    MCHC 30 4 (L) 31 4 - 37 4 g/dL    RDW 18 0 (H) 11 6 - 15 1 %    MPV 10 8 8 9 - 12 7 fL    Platelets 720 385 - 510 Thousands/uL    nRBC 0 /100 WBCs    Neutrophils Relative 71 43 - 75 %    Immat GRANS % 1 0 - 2 %    Lymphocytes Relative 15 14 - 44 %    Monocytes Relative 10 4 - 12 %    Eosinophils Relative 3 0 - 6 %    Basophils Relative 0 0 - 1 %    Neutrophils Absolute 7 15 1 85 - 7 62 Thousands/µL    Immature Grans Absolute 0 08 0 00 - 0 20 Thousand/uL    Lymphocytes Absolute 1 55 0 60 - 4 47 Thousands/µL    Monocytes Absolute 1 02 0 17 - 1 22 Thousand/µL    Eosinophils Absolute 0 29 0 00 - 0 61 Thousand/µL    Basophils Absolute 0 03 0 00 - 0 10 Thousands/µL   Basic metabolic panel    Collection Time: 08/13/18  5:31 AM   Result Value Ref Range    Sodium 137 136 - 145 mmol/L    Potassium 3 7 3 5 - 5 3 mmol/L    Chloride 104 100 - 108 mmol/L    CO2 27 21 - 32 mmol/L    Anion Gap 6 4 - 13 mmol/L    BUN 12 5 - 25 mg/dL    Creatinine 0 68 0 60 - 1 30 mg/dL    Glucose 81 65 - 140 mg/dL    Calcium 8 1 (L) 8 3 - 10 1 mg/dL    eGFR 83 ml/min/1 73sq m   Fingerstick Glucose (POCT)    Collection Time: 08/13/18  6:52 AM   Result Value Ref Range    POC Glucose 91 65 - 140 mg/dl   Fingerstick Glucose (POCT)    Collection Time: 08/13/18 11:01 AM   Result Value Ref Range    POC Glucose 159 (H) 65 - 140 mg/dl       Radiology Results: I have personally reviewed pertinent reports  Other Diagnostic Testing:   I have personally reviewed pertinent reports          Active Meds:   Current Facility-Administered Medications   Medication Dose Route Frequency    acetaminophen (TYLENOL) tablet 650 mg  650 mg Oral Q6H PRN    albuterol (PROVENTIL HFA,VENTOLIN HFA) inhaler 1 puff  1 puff Inhalation BID PRN    amiodarone tablet 200 mg  200 mg Oral Daily With Breakfast    amLODIPine (NORVASC) tablet 5 mg  5 mg Oral Daily    aspirin chewable tablet 81 mg  81 mg Oral Daily    atorvastatin (LIPITOR) tablet 80 mg  80 mg Oral Daily    calcium carbonate (TUMS) chewable tablet 1,000 mg  1,000 mg Oral TID    ceFAZolin (ANCEF) IVPB (premix) 1,000 mg  1,000 mg Intravenous Q8H    diphenhydrAMINE (BENADRYL) tablet 50 mg  50 mg Oral 60 Min Pre-Op    docusate sodium (COLACE) capsule 100 mg  100 mg Oral BID PRN    ferrous sulfate tablet 325 mg  325 mg Oral Every Other Day    fluticasone (FLONASE) 50 mcg/act nasal spray 1 spray  1 spray Nasal Daily    fluticasone (FLOVENT HFA) 110 MCG/ACT inhaler 2 puff  2 puff Inhalation Q12H LANCE    HYDROcodone-acetaminophen (NORCO) 5-325 mg per tablet 1 tablet  1 tablet Oral Q4H PRN    HYDROmorphone (DILAUDID) injection 0 5 mg  0 5 mg Intravenous Q4H PRN    insulin glargine (LANTUS) subcutaneous injection 22 Units 0 22 mL  22 Units Subcutaneous Q12H [facility]    insulin lispro (HumaLOG) 100 units/mL subcutaneous injection 2-12 Units  2-12 Units Subcutaneous 4x Daily (AC & HS)    levETIRAcetam (KEPPRA) tablet 750 mg  750 mg Oral Q12H [facility]    levothyroxine tablet 100 mcg  100 mcg Oral Early Morning    loratadine (CLARITIN) tablet 10 mg  10 mg Oral Daily    magnesium oxide (MAG-OX) tablet 400 mg  400 mg Oral BID    metoclopramide (REGLAN) tablet 5 mg  5 mg Oral Q6H PRN    metoprolol tartrate (LOPRESSOR) partial tablet 12 5 mg  12 5 mg Oral Q12H [address]    nitroglycerin (NITROSTAT) SL tablet 0 4 mg  0 4 mg Sublingual Q3 min PRN    pantoprazole (PROTONIX) EC tablet 20 mg  20 mg Oral Early Morning    polyethylene glycol (MIRALAX) packet 17 g  17 g Oral Daily PRN    rivaroxaban (XARELTO) tablet 15 mg  15 mg Oral Daily         VTE Pharmacologic Prophylaxis: Reason for no pharmacologic prophylaxis xarelto  VTE Mechanical Prophylaxis: reason for no mechanical VTE prophylaxis SP angioplasty    Mj Dover DO

## 2018-08-14 LAB
BACTERIA WND AEROBE CULT: ABNORMAL
BASOPHILS # BLD AUTO: 0.03 THOUSANDS/ΜL (ref 0–0.1)
BASOPHILS NFR BLD AUTO: 0 % (ref 0–1)
EOSINOPHIL # BLD AUTO: 0.33 THOUSAND/ΜL (ref 0–0.61)
EOSINOPHIL NFR BLD AUTO: 3 % (ref 0–6)
ERYTHROCYTE [DISTWIDTH] IN BLOOD BY AUTOMATED COUNT: 17.9 % (ref 11.6–15.1)
GLUCOSE SERPL-MCNC: 121 MG/DL (ref 65–140)
GLUCOSE SERPL-MCNC: 201 MG/DL (ref 65–140)
GLUCOSE SERPL-MCNC: 213 MG/DL (ref 65–140)
GLUCOSE SERPL-MCNC: 70 MG/DL (ref 65–140)
GRAM STN SPEC: ABNORMAL
GRAM STN SPEC: ABNORMAL
HCT VFR BLD AUTO: 32.5 % (ref 34.8–46.1)
HGB BLD-MCNC: 10.1 G/DL (ref 11.5–15.4)
IMM GRANULOCYTES # BLD AUTO: 0.06 THOUSAND/UL (ref 0–0.2)
IMM GRANULOCYTES NFR BLD AUTO: 1 % (ref 0–2)
LYMPHOCYTES # BLD AUTO: 1.83 THOUSANDS/ΜL (ref 0.6–4.47)
LYMPHOCYTES NFR BLD AUTO: 18 % (ref 14–44)
MCH RBC QN AUTO: 24.1 PG (ref 26.8–34.3)
MCHC RBC AUTO-ENTMCNC: 31.1 G/DL (ref 31.4–37.4)
MCV RBC AUTO: 78 FL (ref 82–98)
MONOCYTES # BLD AUTO: 0.99 THOUSAND/ΜL (ref 0.17–1.22)
MONOCYTES NFR BLD AUTO: 10 % (ref 4–12)
NEUTROPHILS # BLD AUTO: 7.03 THOUSANDS/ΜL (ref 1.85–7.62)
NEUTS SEG NFR BLD AUTO: 68 % (ref 43–75)
NRBC BLD AUTO-RTO: 0 /100 WBCS
PLATELET # BLD AUTO: 232 THOUSANDS/UL (ref 149–390)
PMV BLD AUTO: 10.4 FL (ref 8.9–12.7)
RBC # BLD AUTO: 4.19 MILLION/UL (ref 3.81–5.12)
WBC # BLD AUTO: 10.27 THOUSAND/UL (ref 4.31–10.16)

## 2018-08-14 PROCEDURE — G8987 SELF CARE CURRENT STATUS: HCPCS

## 2018-08-14 PROCEDURE — 85025 COMPLETE CBC W/AUTO DIFF WBC: CPT | Performed by: INTERNAL MEDICINE

## 2018-08-14 PROCEDURE — G8979 MOBILITY GOAL STATUS: HCPCS

## 2018-08-14 PROCEDURE — 97162 PT EVAL MOD COMPLEX 30 MIN: CPT

## 2018-08-14 PROCEDURE — 82948 REAGENT STRIP/BLOOD GLUCOSE: CPT

## 2018-08-14 PROCEDURE — G8978 MOBILITY CURRENT STATUS: HCPCS

## 2018-08-14 PROCEDURE — 97166 OT EVAL MOD COMPLEX 45 MIN: CPT

## 2018-08-14 PROCEDURE — 99233 SBSQ HOSP IP/OBS HIGH 50: CPT | Performed by: INTERNAL MEDICINE

## 2018-08-14 PROCEDURE — G8988 SELF CARE GOAL STATUS: HCPCS

## 2018-08-14 RX ORDER — HYDROCODONE BITARTRATE AND ACETAMINOPHEN 5; 325 MG/1; MG/1
1.5 TABLET ORAL EVERY 4 HOURS
Status: DISCONTINUED | OUTPATIENT
Start: 2018-08-14 | End: 2018-08-15

## 2018-08-14 RX ADMIN — LEVOTHYROXINE SODIUM 100 MCG: 100 TABLET ORAL at 05:33

## 2018-08-14 RX ADMIN — INSULIN GLARGINE 22 UNITS: 100 INJECTION, SOLUTION SUBCUTANEOUS at 21:32

## 2018-08-14 RX ADMIN — AMLODIPINE BESYLATE 5 MG: 5 TABLET ORAL at 08:03

## 2018-08-14 RX ADMIN — LORATADINE 10 MG: 10 TABLET ORAL at 08:02

## 2018-08-14 RX ADMIN — LEVETIRACETAM 750 MG: 750 TABLET ORAL at 08:01

## 2018-08-14 RX ADMIN — CEFAZOLIN SODIUM 1000 MG: 1 SOLUTION INTRAVENOUS at 05:33

## 2018-08-14 RX ADMIN — HYDROMORPHONE HYDROCHLORIDE 0.5 MG: 1 INJECTION, SOLUTION INTRAMUSCULAR; INTRAVENOUS; SUBCUTANEOUS at 11:52

## 2018-08-14 RX ADMIN — FLUTICASONE PROPIONATE 2 PUFF: 110 AEROSOL, METERED RESPIRATORY (INHALATION) at 08:05

## 2018-08-14 RX ADMIN — VANCOMYCIN HYDROCHLORIDE 1250 MG: 10 INJECTION, POWDER, LYOPHILIZED, FOR SOLUTION INTRAVENOUS at 23:35

## 2018-08-14 RX ADMIN — HYDROCODONE BITARTRATE AND ACETAMINOPHEN 1.5 TABLET: 5; 325 TABLET ORAL at 21:28

## 2018-08-14 RX ADMIN — AMIODARONE HYDROCHLORIDE 200 MG: 200 TABLET ORAL at 08:02

## 2018-08-14 RX ADMIN — PANTOPRAZOLE SODIUM 20 MG: 20 TABLET, DELAYED RELEASE ORAL at 05:33

## 2018-08-14 RX ADMIN — ATORVASTATIN CALCIUM 80 MG: 80 TABLET, FILM COATED ORAL at 08:02

## 2018-08-14 RX ADMIN — HYDROCODONE BITARTRATE AND ACETAMINOPHEN 1.5 TABLET: 5; 325 TABLET ORAL at 18:44

## 2018-08-14 RX ADMIN — INSULIN LISPRO 4 UNITS: 100 INJECTION, SOLUTION INTRAVENOUS; SUBCUTANEOUS at 11:35

## 2018-08-14 RX ADMIN — HYDROCODONE BITARTRATE AND ACETAMINOPHEN 1 TABLET: 5; 325 TABLET ORAL at 05:33

## 2018-08-14 RX ADMIN — RIVAROXABAN 15 MG: 15 TABLET, FILM COATED ORAL at 08:03

## 2018-08-14 RX ADMIN — LEVETIRACETAM 750 MG: 750 TABLET ORAL at 21:28

## 2018-08-14 RX ADMIN — INSULIN LISPRO 4 UNITS: 100 INJECTION, SOLUTION INTRAVENOUS; SUBCUTANEOUS at 22:02

## 2018-08-14 RX ADMIN — HYDROCODONE BITARTRATE AND ACETAMINOPHEN 1.5 TABLET: 5; 325 TABLET ORAL at 13:30

## 2018-08-14 RX ADMIN — FERROUS SULFATE TAB 325 MG (65 MG ELEMENTAL FE) 325 MG: 325 (65 FE) TAB at 08:03

## 2018-08-14 RX ADMIN — INSULIN GLARGINE 22 UNITS: 100 INJECTION, SOLUTION SUBCUTANEOUS at 08:04

## 2018-08-14 RX ADMIN — METOPROLOL TARTRATE 12.5 MG: 25 TABLET ORAL at 21:27

## 2018-08-14 RX ADMIN — Medication 400 MG: at 18:45

## 2018-08-14 RX ADMIN — POLYETHYLENE GLYCOL 3350 17 G: 17 POWDER, FOR SOLUTION ORAL at 08:08

## 2018-08-14 RX ADMIN — HYDROMORPHONE HYDROCHLORIDE 0.5 MG: 1 INJECTION, SOLUTION INTRAMUSCULAR; INTRAVENOUS; SUBCUTANEOUS at 15:52

## 2018-08-14 RX ADMIN — FLUTICASONE PROPIONATE 2 PUFF: 110 AEROSOL, METERED RESPIRATORY (INHALATION) at 21:33

## 2018-08-14 RX ADMIN — ASPIRIN 81 MG 81 MG: 81 TABLET ORAL at 08:03

## 2018-08-14 RX ADMIN — HYDROCODONE BITARTRATE AND ACETAMINOPHEN 1 TABLET: 5; 325 TABLET ORAL at 09:48

## 2018-08-14 RX ADMIN — Medication 400 MG: at 08:02

## 2018-08-14 RX ADMIN — VANCOMYCIN HYDROCHLORIDE 1250 MG: 10 INJECTION, POWDER, LYOPHILIZED, FOR SOLUTION INTRAVENOUS at 12:04

## 2018-08-14 RX ADMIN — HYDROMORPHONE HYDROCHLORIDE 0.5 MG: 1 INJECTION, SOLUTION INTRAMUSCULAR; INTRAVENOUS; SUBCUTANEOUS at 06:32

## 2018-08-14 RX ADMIN — METOPROLOL TARTRATE 12.5 MG: 25 TABLET ORAL at 08:01

## 2018-08-14 RX ADMIN — FLUTICASONE PROPIONATE 1 SPRAY: 50 SPRAY, METERED NASAL at 08:05

## 2018-08-14 NOTE — PLAN OF CARE
Problem: PHYSICAL THERAPY ADULT  Goal: Performs mobility at highest level of function for planned discharge setting  See evaluation for individualized goals  Treatment/Interventions: Functional transfer training, LE strengthening/ROM, Therapeutic exercise, Endurance training, Patient/family training, Equipment eval/education, Bed mobility, Gait training, Spoke to nursing, Spoke to case management  Equipment Recommended: Geoffrey Curran       See flowsheet documentation for full assessment, interventions and recommendations  Prognosis: Good  Problem List: Pain, Decreased skin integrity, Decreased mobility  Assessment: Pt is a [de-identified] y/o female with Osteomyelitis of R 3rd toe and Cellulitis of Left foot with wound of left great toe  Pt currently does not have orders for wt bearing restrictions nor specialty shoes  Will need to clarify with podiatry  Pt with limited mobility due to B foot pain, left worse than right  Pt requires extra time for all activities  Pt has hospital bed in place at home, using bedrails and elevating HOB for transfers previously  Pt unable to ambulate more than 20ft dueto B foot pain  Unable to attempt the stairs at this time and pt has 5 steps to enter home  Pt has 1st floor setup in place  Pt will benefit from continued therapy to maximize her functional independence and safety  Barriers to Discharge: Decreased caregiver support     Recommendation: Home with family support, Home PT, Post acute IP rehab          See flowsheet documentation for full assessment

## 2018-08-14 NOTE — PROGRESS NOTES
Residency Progress Note   Unit/Bed#: -01 Encounter: [id number]  SOD Team C       Mejia Fox [de-identified] y o  female [id number]    Hospital Stay Days: 5      Assessment/Plan:    Principal Problem:    Cellulitis  Active Problems:    Essential hypertension    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 native heart without angina pectoris    Type 2 diabetes mellitus with complication, with long-term current use of insulin (HCC)    Atrial flutter (HCC)    Hyperlipidemia    Gastroesophageal reflux disease without esophagitis    Chronic combined systolic and diastolic congestive heart failure (HCC)    Paroxysmal atrial fibrillation (HCC)    Ulcer of toe of left foot (HCC)    Ulcer of toe of right foot (HCC)    History of CVA (cerebrovascular accident)    Seizure ([address] )    Anemia    PAD (peripheral artery disease) ([facility])    CKD (chronic kidney disease) stage 3, GFR 30-59 ml/min    1  B/L Toe Wounds with Cellulitis of the L foot and  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 - cellulitis is worsening  S/p Successful angioplasty of the mid right peroneal artery and distal SFA which resulted in complete patency of the right peroneal artery and increased luminal diameter of distal SFA with vascular surgery   - Stop Ancef and start vancomycin  -Wound Care and podiatry consultations  - pain control with norco to q4 H scheduled, added dilaudid breakthrough   - follow-up repeat ABIs per vascular surgery  - podiatry is onboard and await recommendations for R toe osteomyelitis     2  Combined Diastolic & Systolic Heart Failure   -ROBB on 8/4/7533 showed systolic function was moderately to markedly reduced  Ejection fraction was estimated to be 35 %   -appears euvolemic on exam  -Continue Furosemide 40 mg daily   -Continue Lisinopril 5 mg     3  CAD   -ASA 81 mg   -Atorvastatin 80 mg  -Metoprolol 12 5 mg     4  Paroxysmal Atrial Fibrillation  -NSR  -rates well controlled  -Amiodarone 200 mg  -Xarelto 20 mg daily      5   Type 2 DM  -Metformin and Lantus at home  -Hold Metformin  -increase to Lantus 22 units q12 hours  -Humalog with meals and daily at bedtime      6  GERD  -Pantoprazole 20 mg      7  Microcytic Anemia  -Hgb stable   -Ferrous sulfate every other day      8  Asthma  -Albuterol prn  -Fluticasone 2 puffs q12 hours      9  Seizure Disorder  -Keppra 750 mg q12 hours      10  Hypothyroidism  Levothyroxine 1000 mcg     Disposition:  Continue inpatient care  Await Podiatry recommendations of whether patient will go to the OR  Subjective:   Patient reports significant pain in her bilateral feet now increasing on the left  She does have increased swelling due to her hanging her left foot off the side of the bed because this decreases her pain  Per nursing staff no acute events overnight  Vitals: Temp (24hrs), Av 7 °F (37 1 °C), Min:98 °F (36 7 °C), Max:99 2 °F (37 3 °C)  Current: Temperature: 98 8 °F (37 1 °C)  Vitals:    18 1623 18 2123 18 2331 18 0700   BP: 160/66 162/64 (!) 182/78 155/58   BP Location: Right arm  Left arm Left arm   Pulse: 58 65 58 60   Resp: 18  18 20   Temp: 98 °F (36 7 °C)  99 2 °F (37 3 °C) 98 8 °F (37 1 °C)   TempSrc: Oral  Oral Oral   SpO2: 96%  94% 95%   Weight:       Height:        Body mass index is 27 65 kg/m²  I/O last 24 hours: In: 600 [P O :600]  Out: 1225 [Urine:1225]      Physical Exam   Constitutional: She is oriented to person, place, and time  She appears well-developed and well-nourished  No distress  HENT:   Head: Normocephalic and atraumatic  Eyes: EOM are normal    Neck: No tracheal deviation present  Cardiovascular: Normal rate and regular rhythm  No murmur heard  Pulmonary/Chest: Effort normal and breath sounds normal  No stridor  Abdominal: Soft  She exhibits no distension  Neurological: She is alert and oriented to person, place, and time  Skin: Skin is warm and dry     Increaseing swelling and erythema of LLE   Psychiatric: She has a normal mood and affect  Her behavior is normal    Nursing note and vitals reviewed  Invasive Devices     Peripheral Intravenous Line            Peripheral IV 08/10/18 Left Forearm 3 days                          Labs:   Recent Results (from the past 24 hour(s))   Fingerstick Glucose (POCT)    Collection Time: 08/13/18 11:01 AM   Result Value Ref Range    POC Glucose 159 (H) 65 - 140 mg/dl   Fingerstick Glucose (POCT)    Collection Time: 08/13/18  4:22 PM   Result Value Ref Range    POC Glucose 185 (H) 65 - 140 mg/dl   Fingerstick Glucose (POCT)    Collection Time: 08/13/18  9:05 PM   Result Value Ref Range    POC Glucose 262 (H) 65 - 140 mg/dl   CBC and differential    Collection Time: 08/14/18  5:30 AM   Result Value Ref Range    WBC 10 27 (H) 4 31 - 10 16 Thousand/uL    RBC 4 19 3 81 - 5 12 Million/uL    Hemoglobin 10 1 (L) 11 5 - 15 4 g/dL    Hematocrit 32 5 (L) 34 8 - 46 1 %    MCV 78 (L) 82 - 98 fL    MCH 24 1 (L) 26 8 - 34 3 pg    MCHC 31 1 (L) 31 4 - 37 4 g/dL    RDW 17 9 (H) 11 6 - 15 1 %    MPV 10 4 8 9 - 12 7 fL    Platelets 080 516 - 896 Thousands/uL    nRBC 0 /100 WBCs    Neutrophils Relative 68 43 - 75 %    Immat GRANS % 1 0 - 2 %    Lymphocytes Relative 18 14 - 44 %    Monocytes Relative 10 4 - 12 %    Eosinophils Relative 3 0 - 6 %    Basophils Relative 0 0 - 1 %    Neutrophils Absolute 7 03 1 85 - 7 62 Thousands/µL    Immature Grans Absolute 0 06 0 00 - 0 20 Thousand/uL    Lymphocytes Absolute 1 83 0 60 - 4 47 Thousands/µL    Monocytes Absolute 0 99 0 17 - 1 22 Thousand/µL    Eosinophils Absolute 0 33 0 00 - 0 61 Thousand/µL    Basophils Absolute 0 03 0 00 - 0 10 Thousands/µL   Fingerstick Glucose (POCT)    Collection Time: 08/14/18  6:51 AM   Result Value Ref Range    POC Glucose 70 65 - 140 mg/dl       Radiology Results: I have personally reviewed pertinent reports  Other Diagnostic Testing:   I have personally reviewed pertinent reports          Active Meds:   Current Facility-Administered Medications   Medication Dose Route Frequency    acetaminophen (TYLENOL) tablet 650 mg  650 mg Oral Q6H PRN    albuterol (PROVENTIL HFA,VENTOLIN HFA) inhaler 1 puff  1 puff Inhalation BID PRN    amiodarone tablet 200 mg  200 mg Oral Daily With Breakfast    amLODIPine (NORVASC) tablet 5 mg  5 mg Oral Daily    aspirin chewable tablet 81 mg  81 mg Oral Daily    atorvastatin (LIPITOR) tablet 80 mg  80 mg Oral Daily    calcium carbonate (TUMS) chewable tablet 1,000 mg  1,000 mg Oral TID    ceFAZolin (ANCEF) IVPB (premix) 1,000 mg  1,000 mg Intravenous Q8H    diphenhydrAMINE (BENADRYL) tablet 50 mg  50 mg Oral 60 Min Pre-Op    docusate sodium (COLACE) capsule 100 mg  100 mg Oral BID PRN    ferrous sulfate tablet 325 mg  325 mg Oral Every Other Day    fluticasone (FLONASE) 50 mcg/act nasal spray 1 spray  1 spray Nasal Daily    fluticasone (FLOVENT HFA) 110 MCG/ACT inhaler 2 puff  2 puff Inhalation Q12H [address]    HYDROcodone-acetaminophen (NORCO) 5-325 mg per tablet 1 tablet  1 tablet Oral Q4H PRN    HYDROmorphone (DILAUDID) injection 0 5 mg  0 5 mg Intravenous Q4H PRN    insulin glargine (LANTUS) subcutaneous injection 22 Units 0 22 mL  22 Units Subcutaneous Q12H [address]    insulin lispro (HumaLOG) 100 units/mL subcutaneous injection 2-12 Units  2-12 Units Subcutaneous 4x Daily (AC & HS)    levETIRAcetam (KEPPRA) tablet 750 mg  750 mg Oral Q12H [address]    levothyroxine tablet 100 mcg  100 mcg Oral Early Morning    loratadine (CLARITIN) tablet 10 mg  10 mg Oral Daily    magnesium oxide (MAG-OX) tablet 400 mg  400 mg Oral BID    metoclopramide (REGLAN) tablet 5 mg  5 mg Oral Q6H PRN    metoprolol tartrate (LOPRESSOR) partial tablet 12 5 mg  12 5 mg Oral Q12H [address]    nitroglycerin (NITROSTAT) SL tablet 0 4 mg  0 4 mg Sublingual Q3 min PRN    pantoprazole (PROTONIX) EC tablet 20 mg  20 mg Oral Early Morning    polyethylene glycol (MIRALAX) packet 17 g  17 g Oral Daily PRN    rivaroxaban (XARELTO) tablet 15 mg  15 mg Oral Daily         VTE Pharmacologic Prophylaxis: Reason for no pharmacologic prophylaxis xarelto  VTE Mechanical Prophylaxis: sequential compression device    River Smart DO

## 2018-08-14 NOTE — OCCUPATIONAL THERAPY NOTE
[address] Evaluation     Patient Name: Javier Looney Date: 8/14/2018  Problem List  Patient Active Problem List   Diagnosis    Essential hypertension    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 native heart without angina pectoris    Type 2 diabetes mellitus with complication, with long-term current use of insulin (HCC)    Atrial flutter (HCC)    Hyperlipidemia    Gastroesophageal reflux disease without esophagitis    Moderate mitral stenosis    Arthritis of hand    Acute respiratory failure with hypoxia ([facility] )    Asthma    Asymptomatic carotid artery stenosis, bilateral    Atherosclerosis of native artery of both lower extremities with bilateral ulceration (HCC)    Hx of CABG    Chronic anticoagulation    Chronic combined systolic and diastolic congestive heart failure (HCC)    Degenerative joint disease involving multiple joints    Diabetic retinopathy (HCC)    Postoperative hypothyroidism    Migraine headache    Nephrolithiasis    Osteoarthritis of both knees    Paroxysmal atrial fibrillation (HCC)    Ulcer of toe of left foot (HCC)    Ulcer of toe of right foot (HCC)    Prolonged Q-T interval on ECG    Hypokalemia    Hypocalcemia    Left bundle branch block (LBBB)    1st degree AV block    S/P TAVR (transcatheter aortic valve replacement)    Expressive aphasia    Multiple lacunar infarcts ([facility])    Vomiting    History of CVA (cerebrovascular accident)    Seizure ([facility] )    Supratherapeutic INR    NSVT (nonsustained ventricular tachycardia) (HCC)    Pulmonary hypertension (HCC)    Anemia    Severe sepsis (HCC)    Lactic acidosis    Hypomagnesemia    Bradycardia    Chest pain    Foot pain    PAD (peripheral artery disease) (HCC)    Cellulitis    CKD (chronic kidney disease) stage 3, GFR 30-59 ml/min     Past Medical History  Past Medical History:   Diagnosis Date    Altered mental status     Anemia     Anticoagulated Xarelto for Afib/ flutter    Asthma     Atrial flutter (HCC)     maintained on anticoag w/ Xarelto    Bradycardia     CAD (coronary artery disease)     Candidiasis     Carotid stenosis, bilateral     Cataract     Chest pain     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) (HCC)     Chronic fatigue syndrome     Chronic low back pain     Chronic ulcer of toes (HCC)     left and right    Concussion w loss of consciousness of unsp duration, init     CVA (cerebral vascular accident) ([facility] ) 4/17/2018    Diabetes mellitus ([facility]     type 2, insulin dependent    DJD (degenerative joint disease)     Expressive aphasia     Foot pain     GERD (gastroesophageal reflux disease)     Herpes zoster     HLD (hyperlipidemia)     HTN (hypertension)     Hypothyroidism     Irritable bowel syndrome     Kidney stone     Lumbar radiculopathy     Lyme disease     Dx in hospital 8/2011    MI (myocardial infarction) ([facility] )     Migraines     Muscle spasm     Non-neoplastic nevus     Nontoxic multinodular goiter     NSVT (nonsustained ventricular tachycardia) (HCC)     Osteoarthritis     Other chronic pain     PAD (peripheral artery disease) (HCC)     Palpitations     Paroxysmal atrial fibrillation (HCC)     Pseudogout     Pulmonary hypertension (HCC)     S/P TAVR (transcatheter aortic valve replacement)     Seizures (HCC)     Severe sepsis ([facility] )     Spinal stenosis     St[facility] 05/[facility]    Transient cerebral ischemia     Trigger ring finger     Viral gastroenteritis      Past Surgical History  Past Surgical History:   Procedure Laterality Date    APPENDECTOMY      ARTERIOGRAM  12/19/2017    CARDIAC CATHETERIZATION      CHOLECYSTECTOMY      COLONOSCOPY      CORONARY ARTERY BYPASS GRAFT  2004    IR ABDOMINAL ANGIOGRAPHY / INTERVENTION  8/10/2018    LUMBAR LAMINECTOMY      DE ECHO TRANSESOPHAG R-T 2D W/PRB IMG ACQUISJ I&R N/A 4/3/2018    Procedure: TRANSESOPHAGEAL ECHOCARDIOGRAM (ROBB); Surgeon: Santa Jesus DO;  [facility];  Service: Cardiac Surgery    NH REPLACE AORTIC VALVE OPENFEMORAL ARTERY APPROACH N/A 4/3/2018    Procedure: REPLACEMENT AORTIC VALVE TRANSCATHETER (TAVR) TRANSFEMORAL w/ 26MM IVY YEVGENIY S3 VALVE;  Surgeon: Santa Jesus DO;  [facility];  Service: Cardiac Surgery    THYROIDECTOMY      TOTAL ABDOMINAL HYSTERECTOMY W/ BILATERAL SALPINGOOPHORECTOMY             08/14/18 1100   Note Type   Note type Eval only   Restrictions/Precautions   Weight Bearing Precautions Per Order No   Other Precautions Fall Risk   Pain Assessment   Pain Assessment 0-10   Pain Score 9   Pain Type Acute pain   Pain Location Foot   Pain Orientation Left   Pain Descriptors Aching   Pain Frequency Constant/continuous   Hospital Pain Intervention(s) Rest   Home Living   Type of Home House   Home Layout Two level; Able to live on main level with bedroom/bathroom;1/2 bath on main level   Bathroom Accessibility Accessible via walker   [address] [facility],[address]; Hospital bed;Cane   Additional Comments Pt lives in [facility] but has had 1st floor set up in place prior to admission  Pt no longer uses second floor and sponge bathes daily  Pt has son and dtr who check on her daily  Pt has family who provides meals for patient and patient occassionally meal preps  Family completes all IADLs  Prior Function   Level of [place] Independent with ADLs and functional mobility   Lives With Alone   Receives Help From Friend(s); Family   ADL Assistance Independent   IADLs Needs assistance   Falls in the last 6 months 0   Vocational Retired   Comments pt has family assist with shopping and meal prep including home mangement tasks   Lifestyle   Intrinsic Gratification making flower arrangements   Psychosocial   Psychosocial (WDL) WDL   ADL   Where Assessed Chair   Eating Assistance 7  Independent   Grooming Assistance 5  Supervision/Setup   Grooming Deficit Increased time to complete;Standing with assistive device; Wash/dry hands; Wash/dry face   UB Bathing Assistance 5  Supervision/Setup   LB Bathing Assistance 4  Minimal Assistance   LB Bathing Deficit Steadying;Supervision/safety; Increased time to complete   UB Dressing Assistance 5  Supervision/Setup    [address]; Requires assistive device for steadying; Increased time to complete   Toileting Assistance  5  Supervision/Setup   Additional Comments Pt able to cross leg over opposing knee and thread b/l LE's into pants  Pt requires steadying assist to pull pants up over hips  Pt able to don/doff socks while having leg crossed over knee at Sup level  Pt requires steadying assist for pants up/down during toileting due to decreased balance and pain in b/l feet  Transfers   Sit to Stand 5  Supervision   Stand to Sit 5  Supervision   Stand pivot 5  Supervision   Toilet transfer 5  Supervision   Additional items Raised toilet seat  (grab bars)   Additional Comments Pt completed transfers at supervision level  Functional Mobility   Functional Mobility 5  Supervision   Additional Comments Pt ablet o walk from bed to bathroom at supervision level with use of RW  Pt with pain in b/l feet requiring supervision for safety   Balance   Static Sitting Normal   Dynamic Sitting Normal   Static Standing Fair +   Dynamic Standing Fair   Ambulatory Fair   Activity Tolerance   Activity Tolerance Patient limited by pain   Nurse Made Aware spoke with TOMY Saul Night   RUE Assessment   RUE Assessment WFL   LUE Assessment   LUE Assessment WFL   Sensation   Light Touch No apparent deficits   Cognition   Overall Cognitive Status [facility]   Arousal/Participation Alert; Cooperative   Attention Within functional limits   Orientation Level Oriented X4   Memory Within functional limits   Following Commands Follows all commands and directions without difficulty   Assessment   Limitation Decreased ADL status; Decreased endurance;Decreased high-level ADLs; Decreased self-care trans   Prognosis Good   Assessment Pt is a [address] y o  female seen for OT evaluation s/p admit to [facility] on 8/8/2018 w/ Cellulitis and gangrene  Pt currently WBAT b/l LE's and has no specialized footwear  Comorbidities affecting pt's functional performance at time of assessment include: essential HTN, CAD, type II DM, atrial flutter, GERD, A fib,   Personal factors affecting pt at time of IE include:limited home support, difficulty performing ADLS and difficulty performing IADLS   Prior to admission, pt was IND with ADLS and fxnl mobility with no device  Pt able to complete simple meal prep and family assisted with all other IADLs  Upon evaluation: Pt requires supervision for fxnl transfers with RW and CGA for toileting and LB dressing 2* the following deficits impacting occupational performance: weakness, decreased strength, decreased balance, decreased tolerance and increased pain  Spoke with RN regarding appropriate footwear for R and L foot  RN will follow up with podiatry  Pt to benefit from continued skilled OT tx while in the hospital to address deficits as defined above and maximize level of functional independence w ADL's and functional mobility  Occupational Performance areas to address include: bathing/shower, toilet hygiene, dressing, functional mobility, clothing management and meal prep  From OT standpoint, recommendation at time of d/c would be home with home OT vs subacute rehab pending medical plan  Goals   LTG Time Frame 10-14   Plan   Treatment Interventions ADL retraining;Functional transfer training; Endurance training;UE strengthening/ROM; Patient/family training;Equipment evaluation/education; Compensatory technique education; Energy conservation; Activityengagement   Goal Expiration Date 08/24/18   OT Frequency 3-5x/wk   Recommendation   OT Discharge Recommendation Other (Comment)  (home OT vs subacute rehab pending medical plan) OT - OK to Discharge (when medically stable)   Barthel Index   Feeding 10   Bathing 0   Grooming Score 5   Dressing Score 5   Bladder Score 10   Bowels Score 10   Toilet Use Score 5   Transfers (Bed/Chair) Score 10   Mobility (Level Surface) Score 0   Stairs Score 5   Barthel Index Score 60   Modified Rae Scale   Modified [place] Scale 3     OT GOALS TO  2018    1  PT WILL COMPLETE UB/LB DRESSING WHILE SEATED/STANDING WITH MOD INDEPENDENCE AND USE OF LHAE PRN     2  PT WILL COMPLETE TOILETING WITH MOD INDEPENDENCE, WITH THOROUGH HYGIENE AND CLOTHING MANAGEMENT     3  PT WILL COMPLETE BED MOBILITY AND TRANSFER TO EOB WITH MOD INDEPENDENCE, TO INCREASE FUNCTIONAL MOBILITY FOR PARTICIPATION IN ADLS/IADLS    4  PT WILL COMPLETE FUNCTIONAL TRANSFERS ON/OFF ALL SURFACES, INCLUDING TOILET, WITH MOD INDEPENDENCE AND DME PRN TO INCREASE PARTICIPATION IN ADLS/IADLS     5  PT WILL COMPLETE LIGHT GROOMING TASK WHILE STANDING AT SINK FOR 3-5 MINUTES, WITH MOD INDEPENDENCE AND GOOD BALANCE, DME PRN     6  PT WILL INCREASE ACTIVITY TOLERANCE TO 15-20 MINUTES DURING OT TX SESSION TO INCREASE PARTICIPATION IN ADLS/IADLS   7  pT WILL ENGAGE IN SIMPLE MEAL PREP AT MOD I LEVEL WITH ISAURO

## 2018-08-14 NOTE — PLAN OF CARE
Problem: OCCUPATIONAL THERAPY ADULT  Goal: Performs self-care activities at highest level of function for planned discharge setting  See evaluation for individualized goals  Treatment Interventions: ADL retraining, Functional transfer training, Endurance training, UE strengthening/ROM, Patient/family training, Equipment evaluation/education, Compensatory technique education, Energy conservation, Activityengagement          See flowsheet documentation for full assessment, interventions and recommendations  Limitation: Decreased ADL status, Decreased endurance, Decreased high-level ADLs, Decreased self-care trans  Prognosis: Good  Assessment: Pt is a [de-identified] y o  female seen for OT evaluation s/p admit to B on 8/8/2018 w/ Cellulitis and gangrene  Pt currently WBAT b/l LE's and has no specialized footwear  Comorbidities affecting pt's functional performance at time of assessment include: essential HTN, CAD, type II DM, atrial flutter, GERD, A fib,   Personal factors affecting pt at time of IE include:limited home support, difficulty performing ADLS and difficulty performing IADLS   Prior to admission, pt was IND with ADLS and fxnl mobility with no device  Pt able to complete simple meal prep and family assisted with all other IADLs  Upon evaluation: Pt requires supervision for fxnl transfers with RW and CGA for toileting and LB dressing 2* the following deficits impacting occupational performance: weakness, decreased strength, decreased balance, decreased tolerance and increased pain  Spoke with RN regarding appropriate footwear for R and L foot  RN will follow up with podiatry  Pt to benefit from continued skilled OT tx while in the hospital to address deficits as defined above and maximize level of functional independence w ADL's and functional mobility  Occupational Performance areas to address include: bathing/shower, toilet hygiene, dressing, functional mobility, clothing management and meal prep   From OT standpoint, recommendation at time of d/c would be home with home OT vs subacute rehab pending medical plan       OT Discharge Recommendation: Other (Comment) (home OT vs subacute rehab pending medical plan)  OT - OK to Discharge:  (when medically stable)

## 2018-08-14 NOTE — SOCIAL WORK
CM met pt to discuss dcp  Pt informed CM to call her son Dano Pereira for choices  CM called and spoke with Dano Pereira- [phone] introduced self anf made aware of CM role  CM informed Dano Pereira that PT and OT,s recommendation is home PT vs STR  Ezekiel requested referrals to [facility], [address], CM and [facility], referrals in [facility]

## 2018-08-14 NOTE — PHYSICAL THERAPY NOTE
Physical Therapy Eval    Patient Name: Will HOUSTONAEA'Z Date: 8/14/2018     Problem List  Patient Active Problem List   Diagnosis    Essential hypertension    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 native heart without angina pectoris    Type 2 diabetes mellitus with complication, with long-term current use of insulin (HCC)    Atrial flutter (HCC)    Hyperlipidemia    Gastroesophageal reflux disease without esophagitis    Moderate mitral stenosis    Arthritis of hand    Acute [facility] failure with hypoxia ([facility] )    Asthma    Asymptomatic carotid artery stenosis, bilateral    Atherosclerosis of native artery of both lower extremities with bilateral ulceration (HCC)    Hx of CABG    Chronic anticoagulation    Chronic combined systolic and diastolic congestive heart failure ([facility] )    Degenerative joint disease involving multiple joints    Diabetic retinopathy (HCC)    Postoperative hypothyroidism    Migraine headache    Nephrolithiasis    Osteoarthritis of both knees    Paroxysmal atrial fibrillation (HCC)    Ulcer of toe of left foot (HCC)    Ulcer of toe of right foot (HCC)    Prolonged Q-T interval on ECG    Hypokalemia    Hypocalcemia    Left bundle branch block (LBBB)    1st degree AV block    S/P TAVR (transcatheter aortic valve replacement)    Expressive aphasia    Multiple lacunar infarcts (HCC)    Vomiting    History of CVA (cerebrovascular accident)    Seizure ([facility]    Supratherapeutic INR    NSVT (nonsustained ventricular tachycardia) (HCC)    Pulmonary hypertension (HCC)    Anemia    Severe sepsis (HCC)    Lactic acidosis    Hypomagnesemia    Bradycardia    Chest pain    Foot pain    PAD (peripheral artery disease) (HCC)    Cellulitis    CKD (chronic kidney disease) stage 3, GFR 30-59 ml/min        Past Medical History  Past Medical History:   Diagnosis Date    Altered mental status     Anemia     Anticoagulated     Xarelto for Afib/ flutter    Asthma     Atrial flutter (HCC)     maintained on anticoag w/ Xarelto    Bradycardia     CAD (coronary artery disease)     Candidiasis     Carotid stenosis, bilateral     Cataract     Chest pain     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) (HCC)     Chronic fatigue syndrome     Chronic low back pain     Chronic ulcer of toes (HCC)     left and right    Concussion w loss of consciousness of unsp duration, init     CVA (cerebral vascular accident) [facility] ) 4/17/2018    Diabetes mellitus ([facility] )     type 2, insulin dependent    DJD (degenerative joint disease)     Expressive aphasia     Foot pain     GERD (gastroesophageal reflux disease)     Herpes zoster     HLD (hyperlipidemia)     HTN (hypertension)     Hypothyroidism     Irritable bowel syndrome     Kidney stone     Lumbar radiculopathy     Lyme disease     Dx in hospital 8/2011    MI (myocardial infarction) ([facility] )     Migraines     Muscle spasm     Non-neoplastic nevus     Nontoxic multinodular goiter     NSVT (nonsustained ventricular tachycardia) (HCC)     Osteoarthritis     Other chronic pain     PAD (peripheral artery disease) (HCC)     Palpitations     Paroxysmal atrial fibrillation (HCC)     Pseudogout     Pulmonary hypertension (HCC)     S/P TAVR (transcatheter aortic valve replacement)     Seizures (HCC)     Severe sepsis ([facility] )     Spinal stenosis     [facility]    Transient cerebral ischemia     Trigger ring finger     Viral gastroenteritis         Past Surgical History  Past Surgical History:   Procedure Laterality Date    APPENDECTOMY      ARTERIOGRAM  12/19/2017    CARDIAC CATHETERIZATION      CHOLECYSTECTOMY      COLONOSCOPY      CORONARY ARTERY BYPASS GRAFT  2004    IR ABDOMINAL ANGIOGRAPHY / INTERVENTION  8/10/2018    LUMBAR LAMINECTOMY      [place] ECHO TRANSESOPHAG R-T 2D W/PRB IMG ANGELO I&R N/A 4/3/2018    Procedure: TRANSESOPHAGEAL ECHOCARDIOGRAM (ROBB); Surgeon: Davis Grady DO;  [facility];  Service: Cardiac Surgery    [place] REPLACE AORTIC VALVE OPENFEMORAL ARTERY APPROACH N/A 4/3/2018    Procedure: REPLACEMENT AORTIC VALVE TRANSCATHETER (TAVR) TRANSFEMORAL w/ 26MM IVY YEVGENIY S3 VALVE;  Surgeon: Davis Grady DO;  [facility];  Service: Cardiac Surgery    THYROIDECTOMY      TOTAL ABDOMINAL HYSTERECTOMY W/ BILATERAL SALPINGOOPHORECTOMY           08/14/18 0908   Note Type   Note type Eval only   Pain Assessment   Pain Assessment 0-10   Pain Score 6   Pain Type Acute pain   Pain Location Foot   Pain Orientation Left   Pain Descriptors Throbbing   Pain Frequency Constant/continuous   Hospital Pain Intervention(s) Rest   Home Living   Type of Home House   Home Layout Two level; Able to live on main level with bedroom/bathroom;Stairs to enter with rails   [address] [facility]; Hospital bed   Prior Function   Level of [place] Independent with ADLs and functional mobility   Lives With Alone   Receives Help From Family;Friend(s)   ADL Assistance Independent   IADLs Needs assistance   Falls in the last 6 months 0   Comments does not drive, has someone shop for her, cooks for herself or has someone bring in food   Restrictions/Precautions   Weight Bearing Precautions Per Order No   Other Precautions Fall Risk   General   Additional Pertinent History Type 2 DM, Cardiac History, Osteo R 3rd toe   Family/Caregiver Present No   Cognition   Overall Cognitive Status WFL   Arousal/Participation Alert   Orientation Level Oriented X4   Memory Within functional limits   RUE Assessment   RUE Assessment WFL   LUE Assessment   LUE Assessment WFL   RLE Assessment   RLE Assessment WFL   LLE Assessment   LLE Assessment WFL   Coordination   Movements are Fluid and Coordinated 1   Light Touch   RLE Light Touch Grossly intact   LLE Light Touch Grossly intact   Proprioception RLE Proprioception Impaired   LLE Proprioception Impaired   Bed Mobility   Rolling R 5  Supervision   Additional items HOB elevated; Bedrails; Increased time required   Rolling L 5  Supervision   Additional items HOB elevated; Bedrails; Increased time required   Supine to Sit 5  Supervision   Additional items HOB elevated; Bedrails; Increased time required   Sit to Supine 5  Supervision   Additional items Increased time required; Bedrails   Additional Comments has hospital bed at home   Transfers   Sit to Stand 5  Supervision   Stand to Sit 5  Supervision   Stand pivot 5  Supervision   Additional items Other  (with RW)   Toilet transfer 5  Supervision   Additional items Raised toilet seat; Increased time required   Ambulation/Elevation   Gait pattern Decreased foot clearance; Short stride; Step to; Antalgic   Gait Assistance 5  Supervision   Assistive Device Rolling walker   Distance 20   Stair Management Assistance Not tested   Ramp Technique Not tested   Balance   Static Sitting Normal   Dynamic Sitting Normal   Static Standing Fair +   Dynamic Standing Fair   Ambulatory Fair   Activity Tolerance   Activity Tolerance Patient limited by pain   Nurse Made Aware yes spoke with Isidra Jacobs RN   Assessment   Prognosis Good   Problem List Pain;Decreased skin integrity; Decreased mobility   Assessment Pt is a [address] [facility] y/o female with Osteomyelitis of R 3rd toe and Cellulitis of Left foot with wound of left great toe  Pt currently does not have orders for wt bearing restrictions nor specialty shoes  Will need to clarify with podiatry  Pt with limited mobility due to B foot pain, left worse than right  Pt requires extra time for all activities  Pt has hospital bed in place at home, using bedrails and elevating HOB for transfers previously  Pt unable to ambulate more than 20ft dueto B foot pain  Unable to attempt the stairs at this time and pt has 5 steps to enter home  Pt has 1st floor setup in place   Pt will benefit from continued therapy to maximize her functional independence and safety  Barriers to Discharge Decreased caregiver support   Goals   Patient Goals To have less pain and her feet healed so she can walk more   STG Expiration Date 08/21/18   Short Term Goal #1 Pt will be Mod I with bed mobility and transfers adjusting bed as needed and using RW  Pt will be able to ambulate household distances Mod I with RW  Short Term Goal #2 Pt will be able to ascend/descend 6 steps with HR and Supervision   Plan   Treatment/Interventions Functional transfer training;LE strengthening/ROM; Therapeutic exercise; Endurance training;Patient/family training;Equipment eval/education; Bed mobility;Gait training;Spoke to nursing;Spoke to case management   PT Frequency 5x/wk   Recommendation   Recommendation Home with family support;Home PT;Post acute IP rehab   Equipment Recommended Walker   Modified [place] Scale   Modified [place] Scale 3   Barthel Index   Feeding 10   Bathing 5   Grooming Score 5   Dressing Score 10   Bladder Score 10   Bowels Score 10   Toilet Use Score 5   Transfers (Bed/Chair) Score 10   Mobility (Level Surface) Score 0   Stairs Score 0   Barthel Index Score 65

## 2018-08-15 LAB
ANION GAP SERPL CALCULATED.3IONS-SCNC: 6 MMOL/L (ref 4–13)
BACTERIA WND AEROBE CULT: ABNORMAL
BUN SERPL-MCNC: 12 MG/DL (ref 5–25)
CALCIUM SERPL-MCNC: 8.1 MG/DL (ref 8.3–10.1)
CHLORIDE SERPL-SCNC: 105 MMOL/L (ref 100–108)
CO2 SERPL-SCNC: 27 MMOL/L (ref 21–32)
CREAT SERPL-MCNC: 0.67 MG/DL (ref 0.6–1.3)
ERYTHROCYTE [DISTWIDTH] IN BLOOD BY AUTOMATED COUNT: 17.9 % (ref 11.6–15.1)
GFR SERPL CREATININE-BSD FRML MDRD: 83 ML/MIN/1.73SQ M
GLUCOSE SERPL-MCNC: 168 MG/DL (ref 65–140)
GLUCOSE SERPL-MCNC: 207 MG/DL (ref 65–140)
GLUCOSE SERPL-MCNC: 224 MG/DL (ref 65–140)
GLUCOSE SERPL-MCNC: 75 MG/DL (ref 65–140)
GLUCOSE SERPL-MCNC: 80 MG/DL (ref 65–140)
GRAM STN SPEC: ABNORMAL
GRAM STN SPEC: ABNORMAL
HCT VFR BLD AUTO: 31.1 % (ref 34.8–46.1)
HGB BLD-MCNC: 9.3 G/DL (ref 11.5–15.4)
MCH RBC QN AUTO: 23.4 PG (ref 26.8–34.3)
MCHC RBC AUTO-ENTMCNC: 29.9 G/DL (ref 31.4–37.4)
MCV RBC AUTO: 78 FL (ref 82–98)
PLATELET # BLD AUTO: 220 THOUSANDS/UL (ref 149–390)
PMV BLD AUTO: 10.5 FL (ref 8.9–12.7)
POTASSIUM SERPL-SCNC: 4 MMOL/L (ref 3.5–5.3)
RBC # BLD AUTO: 3.97 MILLION/UL (ref 3.81–5.12)
SODIUM SERPL-SCNC: 138 MMOL/L (ref 136–145)
WBC # BLD AUTO: 10.38 THOUSAND/UL (ref 4.31–10.16)

## 2018-08-15 PROCEDURE — 80048 BASIC METABOLIC PNL TOTAL CA: CPT | Performed by: INTERNAL MEDICINE

## 2018-08-15 PROCEDURE — 85027 COMPLETE CBC AUTOMATED: CPT | Performed by: INTERNAL MEDICINE

## 2018-08-15 PROCEDURE — 97110 THERAPEUTIC EXERCISES: CPT

## 2018-08-15 PROCEDURE — 99233 SBSQ HOSP IP/OBS HIGH 50: CPT | Performed by: INTERNAL MEDICINE

## 2018-08-15 PROCEDURE — 82948 REAGENT STRIP/BLOOD GLUCOSE: CPT

## 2018-08-15 PROCEDURE — 99223 1ST HOSP IP/OBS HIGH 75: CPT | Performed by: INTERNAL MEDICINE

## 2018-08-15 RX ORDER — AMLODIPINE BESYLATE 10 MG/1
10 TABLET ORAL DAILY
Status: DISCONTINUED | OUTPATIENT
Start: 2018-08-15 | End: 2018-08-24 | Stop reason: HOSPADM

## 2018-08-15 RX ORDER — LISINOPRIL 5 MG/1
5 TABLET ORAL DAILY
Status: DISCONTINUED | OUTPATIENT
Start: 2018-08-15 | End: 2018-08-20

## 2018-08-15 RX ORDER — HYDROCODONE BITARTRATE AND ACETAMINOPHEN 5; 325 MG/1; MG/1
2 TABLET ORAL EVERY 4 HOURS
Status: DISCONTINUED | OUTPATIENT
Start: 2018-08-15 | End: 2018-08-17

## 2018-08-15 RX ADMIN — LEVETIRACETAM 750 MG: 750 TABLET ORAL at 08:40

## 2018-08-15 RX ADMIN — Medication 1000 MG: at 21:12

## 2018-08-15 RX ADMIN — LISINOPRIL 5 MG: 5 TABLET ORAL at 15:31

## 2018-08-15 RX ADMIN — FLUTICASONE PROPIONATE 1 SPRAY: 50 SPRAY, METERED NASAL at 08:43

## 2018-08-15 RX ADMIN — INSULIN LISPRO 4 UNITS: 100 INJECTION, SOLUTION INTRAVENOUS; SUBCUTANEOUS at 17:45

## 2018-08-15 RX ADMIN — METOPROLOL TARTRATE 12.5 MG: 25 TABLET ORAL at 21:12

## 2018-08-15 RX ADMIN — POLYETHYLENE GLYCOL 3350 17 G: 17 POWDER, FOR SOLUTION ORAL at 08:47

## 2018-08-15 RX ADMIN — AMLODIPINE BESYLATE 10 MG: 10 TABLET ORAL at 08:38

## 2018-08-15 RX ADMIN — INSULIN LISPRO 2 UNITS: 100 INJECTION, SOLUTION INTRAVENOUS; SUBCUTANEOUS at 11:10

## 2018-08-15 RX ADMIN — HYDROCODONE BITARTRATE AND ACETAMINOPHEN 2 TABLET: 5; 325 TABLET ORAL at 18:53

## 2018-08-15 RX ADMIN — FLUTICASONE PROPIONATE 2 PUFF: 110 AEROSOL, METERED RESPIRATORY (INHALATION) at 08:43

## 2018-08-15 RX ADMIN — INSULIN GLARGINE 22 UNITS: 100 INJECTION, SOLUTION SUBCUTANEOUS at 08:43

## 2018-08-15 RX ADMIN — INSULIN LISPRO 4 UNITS: 100 INJECTION, SOLUTION INTRAVENOUS; SUBCUTANEOUS at 21:14

## 2018-08-15 RX ADMIN — HYDROCODONE BITARTRATE AND ACETAMINOPHEN 1.5 TABLET: 5; 325 TABLET ORAL at 03:21

## 2018-08-15 RX ADMIN — HYDROCODONE BITARTRATE AND ACETAMINOPHEN 1.5 TABLET: 5; 325 TABLET ORAL at 07:09

## 2018-08-15 RX ADMIN — LEVOTHYROXINE SODIUM 100 MCG: 100 TABLET ORAL at 05:41

## 2018-08-15 RX ADMIN — VANCOMYCIN HYDROCHLORIDE 1250 MG: 10 INJECTION, POWDER, LYOPHILIZED, FOR SOLUTION INTRAVENOUS at 11:06

## 2018-08-15 RX ADMIN — METOPROLOL TARTRATE 12.5 MG: 25 TABLET ORAL at 08:40

## 2018-08-15 RX ADMIN — HYDROCODONE BITARTRATE AND ACETAMINOPHEN 2 TABLET: 5; 325 TABLET ORAL at 23:38

## 2018-08-15 RX ADMIN — HYDROCODONE BITARTRATE AND ACETAMINOPHEN 1.5 TABLET: 5; 325 TABLET ORAL at 11:11

## 2018-08-15 RX ADMIN — HYDROMORPHONE HYDROCHLORIDE 0.5 MG: 1 INJECTION, SOLUTION INTRAMUSCULAR; INTRAVENOUS; SUBCUTANEOUS at 08:47

## 2018-08-15 RX ADMIN — RIVAROXABAN 15 MG: 15 TABLET, FILM COATED ORAL at 08:40

## 2018-08-15 RX ADMIN — AMIODARONE HYDROCHLORIDE 200 MG: 200 TABLET ORAL at 08:38

## 2018-08-15 RX ADMIN — PANTOPRAZOLE SODIUM 20 MG: 20 TABLET, DELAYED RELEASE ORAL at 05:41

## 2018-08-15 RX ADMIN — ASPIRIN 81 MG 81 MG: 81 TABLET ORAL at 08:41

## 2018-08-15 RX ADMIN — LEVETIRACETAM 750 MG: 750 TABLET ORAL at 21:12

## 2018-08-15 RX ADMIN — LORATADINE 10 MG: 10 TABLET ORAL at 08:40

## 2018-08-15 RX ADMIN — HYDROCODONE BITARTRATE AND ACETAMINOPHEN 2 TABLET: 5; 325 TABLET ORAL at 15:29

## 2018-08-15 RX ADMIN — ATORVASTATIN CALCIUM 80 MG: 80 TABLET, FILM COATED ORAL at 08:40

## 2018-08-15 RX ADMIN — Medication 400 MG: at 08:40

## 2018-08-15 RX ADMIN — FLUTICASONE PROPIONATE 2 PUFF: 110 AEROSOL, METERED RESPIRATORY (INHALATION) at 21:14

## 2018-08-15 RX ADMIN — Medication 400 MG: at 17:47

## 2018-08-15 RX ADMIN — INSULIN GLARGINE 22 UNITS: 100 INJECTION, SOLUTION SUBCUTANEOUS at 21:12

## 2018-08-15 NOTE — PROGRESS NOTES
IM Residency Progress Note   Unit/Bed#: -01 Encounter: [id number]  SOD Team C       Guillermo Malhotra [de-identified] y o  female [id number]    Hospital Stay Days: 6      Assessment/Plan:    Principal Problem:    Cellulitis  Active Problems:    Essential hypertension    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 native heart without angina pectoris    Type 2 diabetes mellitus with complication, with long-term current use of insulin (HCC)    Atrial flutter (HCC)    Hyperlipidemia    Gastroesophageal reflux disease without esophagitis    Chronic combined systolic and diastolic congestive heart failure (HCC)    Paroxysmal atrial fibrillation (HCC)    Ulcer of toe of left foot (HCC)    Ulcer of toe of right foot (HCC)    History of CVA (cerebrovascular accident)    Seizure ([address] )    Anemia    PAD (peripheral artery disease) ([facility])    CKD (chronic kidney disease) stage 3, GFR 30-59 ml/min    1  B/L Toe Wounds with Cellulitis of the L foot and  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   S/p Successful angioplasty of the mid right peroneal artery and distal SFA which resulted in complete patency of the right peroneal artery and increased luminal diameter of distal SFA with vascular surgery   Wound cultures grew MSSA on R wound  L wound grew MSSA, Klebsiell and citrobacter  -Wound Care and podiatry consulted  - status post   - pain control increased today to norco 10q4 H scheduled, with dilaudid for breakthrough   - follow-up repeat ABIs per vascular surgery and will determine need for OR after  - continue vancomycin (day 2 of vancomycin, day 7 of antibiotics), could be switched back to ancef based on sensitivities and MSSA  - will need gram negative coverage most likely so will consult ID for antibiotic recommendations     2  Combined Diastolic & Systolic Heart Failure   -ROBB on 7/4/1577 showed systolic function was moderately to markedly reduced   Ejection fraction was estimated to be 35 %   -appears euvolemic on exam  -Continue Furosemide 40 mg daily   -Continue Lisinopril 5 mg     3  CAD   -ASA 81 mg   -Atorvastatin 80 mg  -Metoprolol 12 5 mg     4  Paroxysmal Atrial Fibrillation  -NSR  -rates well controlled  -Amiodarone 200 mg  -Xarelto 20 mg daily      5  Type 2 DM  -Metformin and Lantus at home  -Hold Metformin  -increase to Lantus 22 units q12 hours  -Humalog with meals and daily at bedtime      6  GERD  -Pantoprazole 20 mg      7  Microcytic Anemia  -Hgb stable   -Ferrous sulfate every other day      8  Asthma  -Albuterol prn  -Fluticasone 2 puffs q12 hours      9  Seizure Disorder  -Keppra 750 mg q12 hours      10  Hypothyroidism  Levothyroxine 1000 mcg     11  HTN -   - increase amlodipine to 10mg daily  - restart on home lisinopril       Disposition: Continue inpatient care  Await results of ABIs for podiatry to make plan for OR or not  Subjective:   Patient reports significant increase in pain after bedside removal of tissue by Podiatry yesterday  Patient has no new concerns or complaints other than this pain  No acute events overnight per nursing staff  Vitals: Temp (24hrs), Av 7 °F (37 1 °C), Min:98 6 °F (37 °C), Max:98 9 °F (37 2 °C)  Current: Temperature: 98 7 °F (37 1 °C)  Vitals:    18 0700 18 1523 08/15/18 0005 08/15/18 0705   BP: 155/58 (!) 175/73 148/77 158/63   BP Location: Left arm Left arm Left arm Left arm   Pulse: 60 56 55 58   Resp: 20 21 20 20   Temp: 98 8 °F (37 1 °C) 98 9 °F (37 2 °C) 98 6 °F (37 °C) 98 7 °F (37 1 °C)   TempSrc: Oral Oral Oral Oral   SpO2: 95% 98% 94% 97%   Weight:       Height:        Body mass index is 27 65 kg/m²  I/O last 24 hours: In: 36 [P O :1070; IV Piggyback:250]  Out:  [Urine:]      Physical Exam   Constitutional: She is oriented to person, place, and time  She appears well-developed and well-nourished  No distress  HENT:   Head: Normocephalic and atraumatic  Eyes: EOM are normal    Neck: No tracheal deviation present  Cardiovascular: Normal rate and regular rhythm  No murmur heard  Pulmonary/Chest: Breath sounds normal  No stridor  No respiratory distress  Slight crackles at bilateral bases   Abdominal: Soft  Bowel sounds are normal  She exhibits no distension  There is no tenderness  Neurological: She is alert and oriented to person, place, and time  Skin: Skin is warm and dry  Left lower extremity with slight improvement in erythema  Continues to be very swollen with chronic ulcers  Right foot with bandage in place  Psychiatric: She has a normal mood and affect  Her behavior is normal    Nursing note and vitals reviewed          Invasive Devices     Peripheral Intravenous Line            Peripheral IV 08/14/18 Right Antecubital less than 1 day                          Labs:   Recent Results (from the past 24 hour(s))   Fingerstick Glucose (POCT)    Collection Time: 08/14/18  4:13 PM   Result Value Ref Range    POC Glucose 121 65 - 140 mg/dl   Fingerstick Glucose (POCT)    Collection Time: 08/14/18  9:15 PM   Result Value Ref Range    POC Glucose 213 (H) 65 - 140 mg/dl   CBC    Collection Time: 08/15/18  5:40 AM   Result Value Ref Range    WBC 10 38 (H) 4 31 - 10 16 Thousand/uL    RBC 3 97 3 81 - 5 12 Million/uL    Hemoglobin 9 3 (L) 11 5 - 15 4 g/dL    Hematocrit 31 1 (L) 34 8 - 46 1 %    MCV 78 (L) 82 - 98 fL    MCH 23 4 (L) 26 8 - 34 3 pg    MCHC 29 9 (L) 31 4 - 37 4 g/dL    RDW 17 9 (H) 11 6 - 15 1 %    Platelets 728 972 - 650 Thousands/uL    MPV 10 5 8 9 - 12 7 fL   Basic metabolic panel    Collection Time: 08/15/18  5:40 AM   Result Value Ref Range    Sodium 138 136 - 145 mmol/L    Potassium 4 0 3 5 - 5 3 mmol/L    Chloride 105 100 - 108 mmol/L    CO2 27 21 - 32 mmol/L    Anion Gap 6 4 - 13 mmol/L    BUN 12 5 - 25 mg/dL    Creatinine 0 67 0 60 - 1 30 mg/dL    Glucose 75 65 - 140 mg/dL    Calcium 8 1 (L) 8 3 - 10 1 mg/dL    eGFR 83 ml/min/1 73sq m   Fingerstick Glucose (POCT)    Collection Time: 08/15/18 6:35 AM   Result Value Ref Range    POC Glucose 80 65 - 140 mg/dl   Fingerstick Glucose (POCT)    Collection Time: 08/15/18 10:45 AM   Result Value Ref Range    POC Glucose 168 (H) 65 - 140 mg/dl       Radiology Results: I have personally reviewed pertinent reports  Other Diagnostic Testing:   I have personally reviewed pertinent reports          Active Meds:   Current Facility-Administered Medications   Medication Dose Route Frequency    acetaminophen (TYLENOL) tablet 650 mg  650 mg Oral Q6H PRN    albuterol (PROVENTIL HFA,VENTOLIN HFA) inhaler 1 puff  1 puff Inhalation BID PRN    amiodarone tablet 200 mg  200 mg Oral Daily With Breakfast    amLODIPine (NORVASC) tablet 10 mg  10 mg Oral Daily    aspirin chewable tablet 81 mg  81 mg Oral Daily    atorvastatin (LIPITOR) tablet 80 mg  80 mg Oral Daily    calcium carbonate (TUMS) chewable tablet 1,000 mg  1,000 mg Oral TID    diphenhydrAMINE (BENADRYL) tablet 50 mg  50 mg Oral 60 Min Pre-Op    docusate sodium (COLACE) capsule 100 mg  100 mg Oral BID PRN    ferrous sulfate tablet 325 mg  325 mg Oral Every Other Day    fluticasone (FLONASE) 50 mcg/act nasal spray 1 spray  1 spray Nasal Daily    fluticasone (FLOVENT HFA) 110 MCG/ACT inhaler 2 puff  2 puff Inhalation Q12H LANCE    HYDROcodone-acetaminophen (NORCO) 5-325 mg per tablet 2 tablet  2 tablet Oral Q4H    HYDROmorphone (DILAUDID) injection 0 5 mg  0 5 mg Intravenous Q4H PRN    insulin glargine (LANTUS) subcutaneous injection 22 Units 0 22 mL  22 Units Subcutaneous Q12H [facility]    insulin lispro (HumaLOG) 100 units/mL subcutaneous injection 2-12 Units  2-12 Units Subcutaneous 4x Daily (AC & HS)    levETIRAcetam (KEPPRA) tablet 750 mg  750 mg Oral Q12H LANCE    levothyroxine tablet 100 mcg  100 mcg Oral Early Morning    loratadine (CLARITIN) tablet 10 mg  10 mg Oral Daily    magnesium oxide (MAG-OX) tablet 400 mg  400 mg Oral BID    metoclopramide (REGLAN) tablet 5 mg  5 mg Oral Q6H PRN    metoprolol tartrate (LOPRESSOR) partial tablet 12 5 mg  12 5 mg Oral Q12H [facility]    nitroglycerin (NITROSTAT) SL tablet 0 4 mg  0 4 mg Sublingual Q3 min PRN    pantoprazole (PROTONIX) EC tablet 20 mg  20 mg Oral Early Morning    polyethylene glycol (MIRALAX) packet 17 g  17 g Oral Daily PRN    rivaroxaban (XARELTO) tablet 15 mg  15 mg Oral Daily    vancomycin (VANCOCIN) 1,250 mg in sodium chloride 0 9 % 250 mL IVPB  17 5 mg/kg Intravenous Q12H         VTE Pharmacologic Prophylaxis: Reason for no pharmacologic prophylaxis Xarelto  VTE Mechanical Prophylaxis: sequential compression device    Jessa Gaona DO

## 2018-08-15 NOTE — PHYSICAL THERAPY NOTE
Physical Therapy Treatment     Patient Name: Edward Mcbride    PSRAH'Q Date: 8/15/2018     Problem List  Patient Active Problem List   Diagnosis    Essential hypertension    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 native heart without angina pectoris    Type 2 diabetes mellitus with complication, with long-term current use of insulin (HCC)    Atrial flutter (HCC)    Hyperlipidemia    Gastroesophageal reflux disease without esophagitis    Moderate mitral stenosis    Arthritis of hand    Acute respiratory failure [facility]    Asthma    Asymptomatic carotid artery stenosis, bilateral    Atherosclerosis of native artery of both lower extremities with bilateral ulceration (HCC)    Hx of CABG    Chronic anticoagulation    Chronic combined systolic and diastolic congestive heart failure (HCC)    Degenerative joint disease involving multiple joints    Diabetic retinopathy (HCC)    Postoperative hypothyroidism    Migraine headache    Nephrolithiasis    Osteoarthritis of both knees    Paroxysmal atrial fibrillation (HCC)    Ulcer of toe of left foot (HCC)    Ulcer of toe of right foot (HCC)    Prolonged Q-T interval on ECG    Hypokalemia    Hypocalcemia    Left bundle branch block (LBBB)    1st degree AV block    S/P TAVR (transcatheter aortic valve replacement)    Expressive aphasia    Multiple lacunar infarcts (HCC)    Vomiting    History of CVA (cerebrovascular accident)    [facility]ure ([facility] )    Supratherapeutic INR    NSVT (nonsustained ventricular tachycardia) (HCC)    Pulmonary hypertension (HCC)    Anemia    Severe sepsis (HCC)    Lactic acidosis    Hypomagnesemia    Bradycardia    Chest pain    Foot pain    PAD (peripheral artery disease) (HCC)    Cellulitis    CKD (chronic kidney disease) stage 3, GFR 30-59 ml/min        Past Medical History  Past Medical History:   Diagnosis Date    Altered mental status     Anemia     Anticoagulated     Xarelto for Afib/ flutter    Asthma     Atrial flutter (HCC)     maintained on anticoag w/ Xarelto    Bradycardia     CAD (coronary artery disease)     Candidiasis     Carotid stenosis, bilateral     Cataract     Chest pain     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) (HCC)     Chronic fatigue syndrome     Chronic low back pain     Chronic ulcer of toes (HCC)     left and right    Concussion w loss of consciousness of unsp duration, init     CVA [facility]cere[facility] vascular accident) [facility] ) 4/17/2018    Diabetes mellitus ([facility] )     type 2, insulin dependent    DJD (degenerative joint disease)     Expressive aphasia     Foot pain     GERD (gastroesophageal reflux disease)     Herpes zoster     HLD (hyperlipidemia)     HTN (hypertension)     Hypothyroidism     Irritable bowel syndrome     Kidney stone     Lumbar radiculopathy     Lyme disease     Dx in hospital 8/2011    MI (myocardial infarction) ([facility] )     Migraines     Muscle spasm     Non-neoplastic nevus     Nontoxic multinodular goiter     NSVT (nonsustained ventricular tachycardia) (HCC)     Osteoarthritis     Other chronic pain     PAD (peripheral artery disease) (HCC)     Palpitations     Paroxysmal atrial fibrillation (HCC)     Pseudogout     Pulmonary hypertension (HCC)     S/P TAVR (transcatheter aortic valve replacement)     Seizures (HCC)     Severe sepsis ([facility] )     Spinal stenosis     [facility]    Transient cerebral ischemia     Trigger ring finger     Viral gastroenteritis         Past Surgical History  Past Surgical History:   Procedure Laterality Date    APPENDECTOMY      ARTERIOGRAM  12/19/2017    CARDIAC CATHETERIZATION      CHOLECYSTECTOMY      COLONOSCOPY      CORONARY ARTERY BYPASS GRAFT  2004    IR ABDOMINAL ANGIOGRAPHY / INTERVENTION  8/10/2018    LUMBAR LAMINECTOMY      ID ECHO TRANSESOPHAG R-T 2D W/PRB IMG ANGELO I&R N/A 4/3/2018    Procedure: TRANSESOPHAGEAL ECHOCARDIOGRAM (ROBB); Surgeon: Cathryn Kwong DO;  [facility];  Service: Cardiac Surgery    SD REPLACE AORTIC VALVE OPENFEMORAL ARTERY APPROACH N/A 4/3/2018    Procedure: REPLACEMENT AORTIC VALVE TRANSCATHETER (TAVR) TRANSFEMORAL w/ 26MM IVY YEVGENIY S3 VALVE;  Surgeon: Cathryn Kwong DO;  [facility];  Service: Cardiac Surgery    THYROIDECTOMY      TOTAL ABDOMINAL HYSTERECTOMY W/ BILATERAL SALPINGOOPHORECTOMY             08/15/18 0939   Pain Assessment   Pain Score Worst Possible Pain   Pain Type Acute pain   Pain Location Foot   Pain Orientation Bilateral   Hospital Pain Intervention(s) Elevated  (Pt  was medicated earlier )   Restrictions/Precautions   Weight Bearing Precautions Per Order (No specofic order but surgical shoe is in her room )   Other Precautions Fall Risk;Pain   Cognition   Arousal/Participation Alert   Attention Within functional limits   Memory Within functional limits   Following Commands Follows all commands and directions without difficulty   Subjective   Subjective Pt  reported severe pain on B feet and refused to do any mobility but is agreeable to participate in bedside thera ex    Exercises   Glute Sets Supine;AROM; Bilateral  (30)   Hip Flexion Supine;Bilateral  (SLR and knee to chest )   Hip Abduction Supine;Bilateral  (30, heel supported )   Knee AROM Short Arc Quad Supine;Bilateral  (30)   Ankle Pumps Supine;Bilateral  (30)   Assessment   Prognosis Good   Problem List Decreased endurance;Decreased mobility; Decreased skin integrity;Pain   Assessment Pt  in severe pain on B feet L> R and refused to get OOb this session  Pt  though is agreeable to do bedside thera ex  Pt  was dangling L LE OOB at start of session stating that it feels better doing it that way but noted inc swelling on L foot  Educated pt  to elevate B LE on pillow to dec swelling and to do thera ex in bed as much as she can   Pt  able to demonstrate understanding  pt  planned to get OOb later with nursing when her pain is better, Cont with POC as tolerated    Barriers to Discharge Inaccessible home environment;Decreased caregiver support   Barriers to Discharge Comments Lives alone    Plan   Treatment/Interventions Functional transfer training;LE strengthening/ROM; Elevations; Therapeutic exercise; Endurance training;Patient/family training;Equipment eval/education; Bed mobility;Gait training   Progress Improving as expected   Recommendation   Recommendation Home PT; Home with family support; Post acute IP rehab   Equipment Recommended [place] [place]

## 2018-08-15 NOTE — CONSULTS
Consultation - Infectious Disease   Nash Guzman [de-identified] y o  female MRN: [id number]  Unit/Bed#: -01 Encounter: [id number]      IMPRESSION & RECOMMENDATIONS:   Impression/Recommendations:  1  Chronic bilateral foot ischemic ulcers  No clinical evidence of active infection or cellulitis  Consider chronic osteomyelitis of right 3rd toe  Erythema around left hallux more consistent with ischemic rubor  Positive wound cultures likely represent wound colonizers  Clinically stable without sepsis  Rec:  · Discontinue antibiotics and follow closely  · Continue [address] per podiatry  · Await final surgical plan from podiatry  · Follow temperatures closely  · Supportive care as per the primary service    2  PAD  Status PTA  Rec:  · Close vascular surgery follow-up    Antibiotics:  Cefazolin # 6  Antibiotics # 8    Thank you for this consultation  We will follow along with you  HISTORY OF PRESENT ILLNESS:  Reason for Consult: Cellulitis    HPI: Nash Guzman is a [de-identified] y o  female with multiple medical problems as listed below including diabetes and PAD  Patient has a history of chronic bilateral foot ischemic wounds being followed followed closely by the wound Center for palliative wound care  She presented to the emergency department on 08/08 complaining of increased pain and erythema of the left foot  Upon presentation she was noted to be afebrile with a normal WBC  Labs revealed a normal WBC  She was started empirically on  She was seen by vascular surgery who recommended arteriogram   She underwent this on 08/10 and had PTA  She has been seen by Podiatry who performed localized debridement   They are recommending TMA versus BKA we are asked to comment on further evaluation and management  REVIEW OF SYSTEMS:  Patient complains of bilateral foot pain  Denies fevers, chills, sweats, nausea, vomiting, or diarrhea  A complete system-based review of systems is otherwise negative      PAST MEDICAL HISTORY:  Past Medical History:   Diagnosis Date    Altered mental status     Anemia     Anticoagulated     Xarelto for Afib/ flutter    Asthma     Atrial flutter (HCC)     maintained on anticoag w/ Xarelto    Bradycardia     CAD (coronary artery disease)     Candidiasis     Carotid stenosis, bilateral     Cataract     Chest pain     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) (HCC)     Chronic fatigue syndrome     Chronic low back pain     Chronic ulcer of toes (HCC)     left and right    Concussion w loss of consciousness of unsp duration, init     CVA [facility]cere[facility] vascular [facility]) [facility] ) 4/17/2018    [facility]     type 2, insulin dependent    DJD (degenerative joint disease)     Expressive aphasia     Foot pain     GERD (gastroesophageal reflux disease)     Herpes zoster     HLD (hyperlipidemia)     HTN (hypertension)     Hypothyroidism     Irritable bowel syndrome     Kidney stone     Lumbar radiculopathy     Lyme disease     Dx in hospital 8/2011    MI (myocardial infarction) ([facility] [facility] )     Migraines     Muscle spasm     Non-neoplastic nevus     Nontoxic multinodular goiter     NSVT (nonsustained ventricular tachycardia) (HCC)     Osteoarthritis     Other chronic pain     PAD (peripheral artery disease) (HCC)     Palpitations     Paroxysmal atrial fibrillation (HCC)     Pseudogout     Pulmonary hypertension (HCC)     S/P TAVR (transcatheter aortic valve replacement)     Seizures (HCC)     Severe sepsis ([facility] )     Spinal stenosis     [facility]    Transient cerebral ischemia     Trigger ring finger     Viral gastroenteritis      Past Surgical History:   Procedure Laterality Date    APPENDECTOMY      ARTERIOGRAM  12/19/2017    CARDIAC CATHETERIZATION      CHOLECYSTECTOMY      COLONOSCOPY      CORONARY ARTERY BYPASS GRAFT  2004    IR ABDOMINAL ANGIOGRAPHY / INTERVENTION  8/10/2018    LUMBAR LAMINECTOMY      MT ECHO TRANSESOPHAG R-T 2D W/PRB IMG ACQUISJ I&R N/A 4/3/2018    Procedure: TRANSESOPHAGEAL ECHOCARDIOGRAM (ROBB); Surgeon: Jose Haas DO;  [facility];  Service: Cardiac Surgery    VA REPLACE AORTIC VALVE OPENFEMORAL ARTERY APPROACH N/A 4/3/2018    Procedure: REPLACEMENT AORTIC VALVE TRANSCATHETER (TAVR) TRANSFEMORAL w/ 26MM IVY YEVGENIY S3 VALVE;  Surgeon: Jose Haas DO;  [facility];  Service: Cardiac Surgery    THYROIDECTOMY      TOTAL ABDOMINAL HYSTERECTOMY W/ BILATERAL SALPINGOOPHORECTOMY         FAMILY HISTORY:  Non-contributory    SOCIAL HISTORY:  History   Alcohol Use No     History   Drug Use No     History   Smoking Status    Never Smoker   Smokeless Tobacco    Never Used       ALLERGIES:  Allergies   Allergen Reactions    Other Chest Pain     IVP-listed as "chest pain" in previous chart, patient stated this occurred "a long time ago" but does not remember exactly occurred     Cephalosporins Chest Pain    Contrast [Iodinated Diagnostic Agents] Other (See Comments)     Flash pulm edema    Doxycycline Chest Pain    Ketorolac Other (See Comments)     Chest pain     Levaquin [Levofloxacin] Chest Pain    Ondansetron Chest Pain     Prolonged QT    Toradol [Ketorolac Tromethamine] Chest Pain       MEDICATIONS:  All current active medications have been reviewed      PHYSICAL EXAM:  Vitals:  HR:  [55-58] 55  Resp:  [20] 20  BP: (148-158)/(63-77) 151/66  SpO2:  [94 %-97 %] 97 %  Temp (24hrs), Av 5 °F (36 9 °C), Min:98 3 °F (36 8 °C), Max:98 7 °F (37 1 °C)  Current: Temperature: 98 3 °F (36 8 °C)     Physical Exam:  General:  Well-nourished, well-developed, in no acute distress  Eyes:  Conjunctive clear with no hemorrhages or effusions  Oropharynx:  No ulcers, no lesions  Neck:  Supple, no lymphadenopathy  Lungs:  Clear to auscultation bilaterally, no accessory muscle use  Cardiac:  Regular rate and rhythm, no murmurs  Abdomen:  Soft, non-tender, non-distended  Extremities:  No peripheral cyanosis, clubbing, or edema  Skin:  No rashes, no ulcers  Neurological:  Moves all four extremities spontaneously, sensation grossly intact  Left foot:  Dry ulcer left hallux with surrounding cool tender erythema which improves with leg elevation  Dry gangrene right middle toe  Wrap intact to foot  Pictures reviewed which showed clean based ulcerations over lateral and medial aspects of metatarsal region without purulence or cellulitis  LABS, IMAGING, & OTHER STUDIES:  Lab Results:  I have personally reviewed pertinent labs  Results from last 7 days  Lab Units 08/15/18  0540 08/13/18  0531 08/12/18  0541  08/08/18  1641   SODIUM mmol/L 138 137 139  < > 136   POTASSIUM mmol/L 4 0 3 7 4 1  < > 4 3   CHLORIDE mmol/L 105 104 106  < > 101   CO2 mmol/L 27 27 28  < > 26   ANION GAP mmol/L 6 6 5  < > 9   BUN mg/dL 12 12 14  < > 23   CREATININE mg/dL 0 67 0 68 0 73  < > 1 15   EGFR ml/min/1 73sq m 83 83 78  < > 45   GLUCOSE RANDOM mg/dL 75 81 82  < > 135   CALCIUM mg/dL 8 1* 8 1* 8 0*  < > 8 8   AST U/L  --   --   --   --  22   ALT U/L  --   --   --   --  27   ALK PHOS U/L  --   --   --   --  58   TOTAL PROTEIN g/dL  --   --   --   --  8 3*   BILIRUBIN TOTAL mg/dL  --   --   --   --  0 55   < > = values in this interval not displayed  Results from last 7 days  Lab Units 08/15/18  0540 08/14/18  0530 08/13/18  0531   WBC Thousand/uL 10 38* 10 27* 10 12   HEMOGLOBIN g/dL 9 3* 10 1* 10 0*   PLATELETS Thousands/uL 220 232 239       Results from last 7 days  Lab Units 08/12/18  1225 08/08/18  1641   BLOOD CULTURE   --  No Growth After 5 Days  No Growth After 5 Days     GRAM STAIN RESULT  No polys seen  1+ Gram positive cocci in clusters  No polys seen  3+ Gram positive cocci in clusters  --    WOUND CULTURE  2+ Growth of Klebsiella oxytoca*  2+ Growth of Staphylococcus aureus*  2+ Growth of Citrobacter amalonaticus complex*  2+ Growth of Staphylococcus aureus*  --          Imaging Studies:   I have personally reviewed pertinent imaging study reports and images in PACS  Right foot x-ray osteomyelitis distal 3rd phalanx  Left foot x-ray no osseous abnormality    EKG, Pathology, and Other Studies:   I have personally reviewed pertinent reports

## 2018-08-15 NOTE — PROGRESS NOTES
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, Right medial 1st metatarsal head wound   2  Left medial hallux wound, left 2nd dorsal digit eschar   3  PAD  4  DM3  5  CKD, stage 3    Plan:   - Wound culture: Left foot wound growing 2+ klebsiella, 2+ staph A, 2+ citrobacter amalonaticus complex; Right foot wound growing 2+ staph A - will place an infectious disease consult  - Left medial 1st metatarsal head and left medial hallux wounds dressed with Betadine and DSD   - Right medial 1st metatarsal head wound was dressed with Dermagran gauze, right 3rd digit was painted with betadine and dressed with DSD  - Patient will require transmetatarsal amputation vs  BKA pending lower extremity arterial dopplers and vascular recommendations - this was discussed with patient at bedside and son Stefany Bazzi on the telephone    - Continue local wound b/l foot and monitor closely   - Patient is to weight bear as tolerated with surgical shoe bilaterally  Subjective/Objective   Chief Complaint:   Chief Complaint   Patient presents with    Foot Pain     pt comes from PCP with bilatal foot pain, venous ulcers present on both feet       Subjective: [de-identified] y o  y/o female was seen and evaluated at bedside  Blood pressure 158/63, pulse 58, temperature 98 7 °F (37 1 °C), temperature source Oral, resp  rate 20, height 5' 4" (1 626 m), weight 73 1 kg (161 lb 1 6 oz), SpO2 97 %, not currently breastfeeding  ,Body mass index is 27 65 kg/m²  Invasive Devices     Peripheral Intravenous Line            Peripheral IV 08/14/18 Right Antecubital less than 1 day                Physical Exam:   General: Alert, cooperative and no distress  Lungs: Non labored breathing  Heart: Positive S1, S2  Abdomen: Soft, non-tender    Extremity: right foot with ulcer medial right 1st metatarsal head with 100% fibrotic base, no malodor, no signs of infection, does not probe to muscle, tendon, bone  3rd toe right foot with dry stable gangrene with exposed distal phalanx  Left medial 1st metatarsal head and left medial hallux with dry adhered eschar, toes are cool to touch and painful with rubor on dependency to the right foot, continues to be stable with no signs of infection  Lab, Imaging and other studies:   CBC:   Lab Results   Component Value Date    WBC 10 38 (H) 08/15/2018    HGB 9 3 (L) 08/15/2018    HCT 31 1 (L) 08/15/2018    MCV 78 (L) 08/15/2018     08/15/2018    MCH 23 4 (L) 08/15/2018    MCHC 29 9 (L) 08/15/2018    RDW 17 9 (H) 08/15/2018    MPV 10 5 08/15/2018   , CMP:   Lab Results   Component Value Date     08/15/2018    K 4 0 08/15/2018     08/15/2018    CO2 27 08/15/2018    ANIONGAP 6 08/15/2018    BUN 12 08/15/2018    CREATININE 0 67 08/15/2018    GLUCOSE 75 08/15/2018    CALCIUM 8 1 (L) 08/15/2018    EGFR 83 08/15/2018       Imaging: I have personally reviewed pertinent films in PACS  EKG, Pathology, and Other Studies: I have personally reviewed pertinent reports    VTE Pharmacologic Prophylaxis: Heparin

## 2018-08-15 NOTE — PLAN OF CARE
Problem: PHYSICAL THERAPY ADULT  Goal: Performs mobility at highest level of function for planned discharge setting  See evaluation for individualized goals  Treatment/Interventions: Functional transfer training, LE strengthening/ROM, Therapeutic exercise, Endurance training, Patient/family training, Equipment eval/education, Bed mobility, Gait training, Spoke to nursing, Spoke to case management  Equipment Recommended: Jose A Camera       See flowsheet documentation for full assessment, interventions and recommendations  Outcome: Progressing  Prognosis: Good  Problem List: Decreased endurance, Decreased mobility, Decreased skin integrity, Pain  Assessment: Pt  in severe pain on B feet L> R and refused to get OOb this session  Pt  though is agreeable to do bedside thera ex  Pt  was dangling L LE OOB at start of session stating that it feels better doing it that way but noted inc swelling on L foot  Educated pt  to elevate B LE on pillow to dec swelling and to do thera ex in bed as much as she can  Pt  able to demonstrate understanding  pt  planned to get OOb later with nursing when her pain is better, Cont with POC as tolerated   Barriers to Discharge: Inaccessible home environment, Decreased caregiver support  Barriers to Discharge Comments: Lives alone   Recommendation: Home PT, Home with family support, Post acute IP rehab          See flowsheet documentation for full assessment

## 2018-08-16 ENCOUNTER — APPOINTMENT (INPATIENT)
Dept: NON INVASIVE DIAGNOSTICS | Facility: HOSPITAL | Age: 78
DRG: 252 | End: 2018-08-16
Payer: MEDICARE

## 2018-08-16 ENCOUNTER — TRANSCRIBE ORDERS (OUTPATIENT)
Dept: ADMINISTRATIVE | Facility: HOSPITAL | Age: 78
End: 2018-08-16

## 2018-08-16 DIAGNOSIS — M86.9 DIABETIC FOOT ULCER WITH OSTEOMYELITIS (HCC): Primary | ICD-10-CM

## 2018-08-16 DIAGNOSIS — L97.509 DIABETIC FOOT ULCER WITH OSTEOMYELITIS (HCC): Primary | ICD-10-CM

## 2018-08-16 DIAGNOSIS — E11.69 DIABETIC FOOT ULCER WITH OSTEOMYELITIS (HCC): Primary | ICD-10-CM

## 2018-08-16 DIAGNOSIS — E11.621 DIABETIC FOOT ULCER WITH OSTEOMYELITIS (HCC): Primary | ICD-10-CM

## 2018-08-16 LAB
ANION GAP SERPL CALCULATED.3IONS-SCNC: 5 MMOL/L (ref 4–13)
BASOPHILS # BLD AUTO: 0.03 THOUSANDS/ΜL (ref 0–0.1)
BASOPHILS NFR BLD AUTO: 0 % (ref 0–1)
BUN SERPL-MCNC: 17 MG/DL (ref 5–25)
CALCIUM SERPL-MCNC: 8.5 MG/DL (ref 8.3–10.1)
CHLORIDE SERPL-SCNC: 105 MMOL/L (ref 100–108)
CO2 SERPL-SCNC: 29 MMOL/L (ref 21–32)
CREAT SERPL-MCNC: 0.76 MG/DL (ref 0.6–1.3)
EOSINOPHIL # BLD AUTO: 0.41 THOUSAND/ΜL (ref 0–0.61)
EOSINOPHIL NFR BLD AUTO: 3 % (ref 0–6)
ERYTHROCYTE [DISTWIDTH] IN BLOOD BY AUTOMATED COUNT: 18 % (ref 11.6–15.1)
GFR SERPL CREATININE-BSD FRML MDRD: 74 ML/MIN/1.73SQ M
GLUCOSE SERPL-MCNC: 110 MG/DL (ref 65–140)
GLUCOSE SERPL-MCNC: 116 MG/DL (ref 65–140)
GLUCOSE SERPL-MCNC: 251 MG/DL (ref 65–140)
GLUCOSE SERPL-MCNC: 66 MG/DL (ref 65–140)
GLUCOSE SERPL-MCNC: 75 MG/DL (ref 65–140)
HCT VFR BLD AUTO: 32 % (ref 34.8–46.1)
HGB BLD-MCNC: 9.8 G/DL (ref 11.5–15.4)
IMM GRANULOCYTES # BLD AUTO: 0.07 THOUSAND/UL (ref 0–0.2)
IMM GRANULOCYTES NFR BLD AUTO: 1 % (ref 0–2)
LYMPHOCYTES # BLD AUTO: 2.86 THOUSANDS/ΜL (ref 0.6–4.47)
LYMPHOCYTES NFR BLD AUTO: 24 % (ref 14–44)
MCH RBC QN AUTO: 24 PG (ref 26.8–34.3)
MCHC RBC AUTO-ENTMCNC: 30.6 G/DL (ref 31.4–37.4)
MCV RBC AUTO: 78 FL (ref 82–98)
MONOCYTES # BLD AUTO: 1.28 THOUSAND/ΜL (ref 0.17–1.22)
MONOCYTES NFR BLD AUTO: 11 % (ref 4–12)
NEUTROPHILS # BLD AUTO: 7.47 THOUSANDS/ΜL (ref 1.85–7.62)
NEUTS SEG NFR BLD AUTO: 61 % (ref 43–75)
NRBC BLD AUTO-RTO: 0 /100 WBCS
PLATELET # BLD AUTO: 244 THOUSANDS/UL (ref 149–390)
PMV BLD AUTO: 10.1 FL (ref 8.9–12.7)
POTASSIUM SERPL-SCNC: 4.2 MMOL/L (ref 3.5–5.3)
RBC # BLD AUTO: 4.09 MILLION/UL (ref 3.81–5.12)
SODIUM SERPL-SCNC: 139 MMOL/L (ref 136–145)
WBC # BLD AUTO: 12.12 THOUSAND/UL (ref 4.31–10.16)

## 2018-08-16 PROCEDURE — 93923 UPR/LXTR ART STDY 3+ LVLS: CPT | Performed by: SURGERY

## 2018-08-16 PROCEDURE — 97535 SELF CARE MNGMENT TRAINING: CPT

## 2018-08-16 PROCEDURE — 97530 THERAPEUTIC ACTIVITIES: CPT

## 2018-08-16 PROCEDURE — 97116 GAIT TRAINING THERAPY: CPT

## 2018-08-16 PROCEDURE — 85025 COMPLETE CBC W/AUTO DIFF WBC: CPT | Performed by: INTERNAL MEDICINE

## 2018-08-16 PROCEDURE — 99233 SBSQ HOSP IP/OBS HIGH 50: CPT | Performed by: INTERNAL MEDICINE

## 2018-08-16 PROCEDURE — 80048 BASIC METABOLIC PNL TOTAL CA: CPT | Performed by: INTERNAL MEDICINE

## 2018-08-16 PROCEDURE — 82948 REAGENT STRIP/BLOOD GLUCOSE: CPT

## 2018-08-16 PROCEDURE — 93923 UPR/LXTR ART STDY 3+ LVLS: CPT

## 2018-08-16 PROCEDURE — 99232 SBSQ HOSP IP/OBS MODERATE 35: CPT | Performed by: INTERNAL MEDICINE

## 2018-08-16 RX ORDER — POLYETHYLENE GLYCOL 3350 17 G/17G
17 POWDER, FOR SOLUTION ORAL 2 TIMES DAILY
Status: DISCONTINUED | OUTPATIENT
Start: 2018-08-16 | End: 2018-08-24 | Stop reason: HOSPADM

## 2018-08-16 RX ORDER — DOCUSATE SODIUM 100 MG/1
100 CAPSULE, LIQUID FILLED ORAL 2 TIMES DAILY
Status: DISCONTINUED | OUTPATIENT
Start: 2018-08-16 | End: 2018-08-24 | Stop reason: HOSPADM

## 2018-08-16 RX ADMIN — INSULIN GLARGINE 22 UNITS: 100 INJECTION, SOLUTION SUBCUTANEOUS at 09:24

## 2018-08-16 RX ADMIN — DOCUSATE SODIUM 100 MG: 100 CAPSULE, LIQUID FILLED ORAL at 17:35

## 2018-08-16 RX ADMIN — ATORVASTATIN CALCIUM 80 MG: 80 TABLET, FILM COATED ORAL at 09:24

## 2018-08-16 RX ADMIN — METOPROLOL TARTRATE 12.5 MG: 25 TABLET ORAL at 21:07

## 2018-08-16 RX ADMIN — HYDROCODONE BITARTRATE AND ACETAMINOPHEN 2 TABLET: 5; 325 TABLET ORAL at 15:36

## 2018-08-16 RX ADMIN — POLYETHYLENE GLYCOL 3350 17 G: 17 POWDER, FOR SOLUTION ORAL at 10:02

## 2018-08-16 RX ADMIN — HYDROCODONE BITARTRATE AND ACETAMINOPHEN 2 TABLET: 5; 325 TABLET ORAL at 21:07

## 2018-08-16 RX ADMIN — Medication 400 MG: at 17:35

## 2018-08-16 RX ADMIN — LEVETIRACETAM 750 MG: 750 TABLET ORAL at 09:23

## 2018-08-16 RX ADMIN — RIVAROXABAN 15 MG: 15 TABLET, FILM COATED ORAL at 09:24

## 2018-08-16 RX ADMIN — LORATADINE 10 MG: 10 TABLET ORAL at 09:23

## 2018-08-16 RX ADMIN — HYDROCODONE BITARTRATE AND ACETAMINOPHEN 2 TABLET: 5; 325 TABLET ORAL at 02:55

## 2018-08-16 RX ADMIN — HYDROCODONE BITARTRATE AND ACETAMINOPHEN 2 TABLET: 5; 325 TABLET ORAL at 11:17

## 2018-08-16 RX ADMIN — FLUTICASONE PROPIONATE 2 PUFF: 110 AEROSOL, METERED RESPIRATORY (INHALATION) at 21:07

## 2018-08-16 RX ADMIN — METOPROLOL TARTRATE 12.5 MG: 25 TABLET ORAL at 09:23

## 2018-08-16 RX ADMIN — FLUTICASONE PROPIONATE 1 SPRAY: 50 SPRAY, METERED NASAL at 09:23

## 2018-08-16 RX ADMIN — POLYETHYLENE GLYCOL 3350 17 G: 17 POWDER, FOR SOLUTION ORAL at 17:35

## 2018-08-16 RX ADMIN — INSULIN GLARGINE 22 UNITS: 100 INJECTION, SOLUTION SUBCUTANEOUS at 21:07

## 2018-08-16 RX ADMIN — AMIODARONE HYDROCHLORIDE 200 MG: 200 TABLET ORAL at 09:23

## 2018-08-16 RX ADMIN — Medication 400 MG: at 09:23

## 2018-08-16 RX ADMIN — LISINOPRIL 5 MG: 5 TABLET ORAL at 09:23

## 2018-08-16 RX ADMIN — INSULIN LISPRO 6 UNITS: 100 INJECTION, SOLUTION INTRAVENOUS; SUBCUTANEOUS at 21:25

## 2018-08-16 RX ADMIN — HYDROCODONE BITARTRATE AND ACETAMINOPHEN 2 TABLET: 5; 325 TABLET ORAL at 06:57

## 2018-08-16 RX ADMIN — ASPIRIN 81 MG 81 MG: 81 TABLET ORAL at 09:24

## 2018-08-16 RX ADMIN — LEVOTHYROXINE SODIUM 100 MCG: 100 TABLET ORAL at 06:03

## 2018-08-16 RX ADMIN — FERROUS SULFATE TAB 325 MG (65 MG ELEMENTAL FE) 325 MG: 325 (65 FE) TAB at 09:23

## 2018-08-16 RX ADMIN — AMLODIPINE BESYLATE 10 MG: 10 TABLET ORAL at 09:23

## 2018-08-16 RX ADMIN — FLUTICASONE PROPIONATE 2 PUFF: 110 AEROSOL, METERED RESPIRATORY (INHALATION) at 09:23

## 2018-08-16 RX ADMIN — HYDROMORPHONE HYDROCHLORIDE 0.5 MG: 1 INJECTION, SOLUTION INTRAMUSCULAR; INTRAVENOUS; SUBCUTANEOUS at 10:02

## 2018-08-16 RX ADMIN — PANTOPRAZOLE SODIUM 20 MG: 20 TABLET, DELAYED RELEASE ORAL at 06:03

## 2018-08-16 RX ADMIN — LEVETIRACETAM 750 MG: 750 TABLET ORAL at 21:07

## 2018-08-16 NOTE — PLAN OF CARE
Problem: Potential for Falls  Goal: Patient will remain free of falls  INTERVENTIONS:  - Assess patient frequently for physical needs  -  Identify cognitive and physical deficits and behaviors that affect risk of falls  -  [place] fall precautions as indicated by assessment   - Educate patient/family on patient safety including physical limitations  - Instruct patient to call for assistance with activity based on assessment  - Modify environment to reduce risk of injury  - Consider OT/PT consult to assist with strengthening/mobility   Outcome: Progressing      Problem: Prexisting or High Potential for Compromised Skin Integrity  Goal: Skin integrity is maintained or improved  INTERVENTIONS:  - Identify patients at risk for skin breakdown  - Assess and monitor skin integrity  - Assess and monitor nutrition and hydration status  - Monitor labs (i e  albumin)  - Assess for incontinence   - Turn and reposition patient  - Assist with mobility/ambulation  - Relieve pressure over bony prominences  - Avoid friction and shearing  - Provide appropriate hygiene as needed including keeping skin clean and dry  - Evaluate need for skin moisturizer/barrier cream  - Collaborate with interdisciplinary team (i e  Nutrition, Rehabilitation, etc )   - Patient/family teaching   Outcome: Progressing      Problem: Nutrition/Hydration-ADULT  Goal: Nutrient/Hydration intake appropriate for improving, restoring or maintaining nutritional needs  Monitor and assess patient's nutrition/hydration status for malnutrition (ex- brittle hair, bruises, dry skin, pale skin and conjunctiva, muscle wasting, smooth red tongue, and disorientation)  Collaborate with interdisciplinary team and initiate plan and interventions as ordered  Monitor patient's weight and dietary intake as ordered or per policy  Utilize nutrition screening tool and intervene per policy   Determine patient's food preferences and provide high-protein, high-caloric foods as appropriate       INTERVENTIONS:  - Monitor oral intake, urinary output, labs, and treatment plans  - Assess nutrition and hydration status and recommend course of action  - Evaluate amount of meals eaten  - Assist patient with eating if necessary   - Allow adequate time for meals  - Recommend/ encourage appropriate diets, oral nutritional supplements, and vitamin/mineral supplements  - Order, calculate, and assess calorie counts as needed  - Recommend, monitor, and adjust tube feedings and TPN/PPN based on assessed needs  - Assess need for intravenous fluids  - Provide specific nutrition/hydration education as appropriate  - Include patient/family/caregiver in decisions related to nutrition   Outcome: Progressing

## 2018-08-16 NOTE — PROGRESS NOTES
IM Residency Progress Note   Unit/Bed#: -01 Encounter: [id number]  SOD Team C       Tiffanie Held [de-identified] y o  female [id number]    Hospital Stay Days: 7      Assessment/Plan:    Principal Problem:    Cellulitis  Active Problems:    Essential hypertension    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 native heart without angina pectoris    Type 2 diabetes mellitus with complication, with long-term current use of insulin (HCC)    Atrial flutter (HCC)    Hyperlipidemia    Gastroesophageal reflux disease without esophagitis    Chronic combined systolic and diastolic congestive heart failure (HCC)    Paroxysmal atrial fibrillation (HCC)    Ulcer of toe of left foot (HCC)    Ulcer of toe of right foot (HCC)    History of CVA (cerebrovascular accident)    Seizure ([facility] [address] )    Anemia    PAD (peripheral artery disease) ([facility])    CKD (chronic kidney disease) stage 3, GFR 30-59 ml/min    1  B/L Toe Wounds with 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 2/2 to severe PAD s/p IR RLE angioplasties of R distal SFA, mid R peroneal artery  - podiatry following and plan for either amputation vs BKA on the R pending SHADI study  - ABIs pending, machinery reportedly broken at this time  - vascular is following and awaiting ABIs  - Off antibiotics per ID as this is more of chronic circulatory issue and has completed course of antibiotics     2  Combined Diastolic & Systolic Heart Failure   -ROBB on 6/6/0963 showed systolic function was moderately to markedly reduced  Ejection fraction was estimated to be 35 %   -appears euvolemic on exam  -Continue Furosemide 40 mg daily   -Continue Lisinopril 5 mg     3  CAD   -ASA 81 mg   -Atorvastatin 80 mg  -Metoprolol 12 5 mg     4  Paroxysmal Atrial Fibrillation  -NSR  -rates well controlled  -Amiodarone 200 mg  -Xarelto 20 mg daily      5  Type 2 DM  -Metformin and Lantus at home  -Hold Metformin  -increase to Lantus 22 units q12 hours  -Humalog with meals and daily at bedtime      6  GERD  -Pantoprazole 20 mg      7  Microcytic Anemia  -Hgb stable   -Ferrous sulfate every other day      8  Asthma  -Albuterol prn  -Fluticasone 2 puffs q12 hours      9  Seizure Disorder  -Keppra 750 mg q12 hours      10  Hypothyroidism  Levothyroxine 1000 mcg      11  HTN -   - increase amlodipine to 10mg daily  - restart on home lisinopril    Disposition: Await ABIs for vascular and podiatry to make further recommendations  Subjective:   Patient notes continued pain  No acute events overnight per nursing staff  She has no new concerns or complaints at this time  Vitals: Temp (24hrs), Av 4 °F (36 9 °C), Min:98 2 °F (36 8 °C), Max:98 7 °F (37 1 °C)  Current: Temperature: 98 2 °F (36 8 °C)  Vitals:    08/15/18 0705 08/15/18 1531 08/15/18 2349 18 0717   BP: 158/63 151/66 142/73 141/61   BP Location: Left arm Left arm Left arm Left arm   Pulse: 58 55 (!) 54 55   Resp: 20 20 20 20   Temp: 98 7 °F (37 1 °C) 98 3 °F (36 8 °C) 98 7 °F (37 1 °C) 98 2 °F (36 8 °C)   TempSrc: Oral Oral Oral Oral   SpO2: 97% 97% 95% 96%   Weight:       Height:        Body mass index is 27 65 kg/m²  I/O last 24 hours: In: 1160 [P O :910; IV Piggyback:250]  Out: 1150 [Urine:1150]      Physical Exam   Constitutional: She is oriented to person, place, and time  She appears well-developed and well-nourished  No distress  HENT:   Head: Normocephalic and atraumatic  Neck: No tracheal deviation present  Cardiovascular: Normal rate and regular rhythm  No murmur heard  Pulmonary/Chest: Effort normal  No stridor  Abdominal: Soft  Bowel sounds are normal  She exhibits no distension  Musculoskeletal: She exhibits edema  BL chronic toe ulcers   Neurological: She is alert and oriented to person, place, and time  Skin: Skin is warm and dry  Psychiatric: She has a normal mood and affect  Nursing note and vitals reviewed          Invasive Devices     Peripheral Intravenous Line            Peripheral IV 18 Right Antecubital 1 day                          Labs:   Recent Results (from the past 24 hour(s))   Fingerstick Glucose (POCT)    Collection Time: 08/15/18  4:16 PM   Result Value Ref Range    POC Glucose 207 (H) 65 - 140 mg/dl   Fingerstick Glucose (POCT)    Collection Time: 08/15/18  9:09 PM   Result Value Ref Range    POC Glucose 224 (H) 65 - 140 mg/dl   CBC and differential    Collection Time: 08/16/18  6:07 AM   Result Value Ref Range    WBC 12 12 (H) 4 31 - 10 16 Thousand/uL    RBC 4 09 3 81 - 5 12 Million/uL    Hemoglobin 9 8 (L) 11 5 - 15 4 g/dL    Hematocrit 32 0 (L) 34 8 - 46 1 %    MCV 78 (L) 82 - 98 fL    MCH 24 0 (L) 26 8 - 34 3 pg    MCHC 30 6 (L) 31 4 - 37 4 g/dL    RDW 18 0 (H) 11 6 - 15 1 %    MPV 10 1 8 9 - 12 7 fL    Platelets 622 920 - 866 Thousands/uL    nRBC 0 /100 WBCs    Neutrophils Relative 61 43 - 75 %    Immat GRANS % 1 0 - 2 %    Lymphocytes Relative 24 14 - 44 %    Monocytes Relative 11 4 - 12 %    Eosinophils Relative 3 0 - 6 %    Basophils Relative 0 0 - 1 %    Neutrophils Absolute 7 47 1 85 - 7 62 Thousands/µL    Immature Grans Absolute 0 07 0 00 - 0 20 Thousand/uL    Lymphocytes Absolute 2 86 0 60 - 4 47 Thousands/µL    Monocytes Absolute 1 28 (H) 0 17 - 1 22 Thousand/µL    Eosinophils Absolute 0 41 0 00 - 0 61 Thousand/µL    Basophils Absolute 0 03 0 00 - 0 10 Thousands/µL   Basic metabolic panel    Collection Time: 08/16/18  6:07 AM   Result Value Ref Range    Sodium 139 136 - 145 mmol/L    Potassium 4 2 3 5 - 5 3 mmol/L    Chloride 105 100 - 108 mmol/L    CO2 29 21 - 32 mmol/L    Anion Gap 5 4 - 13 mmol/L    BUN 17 5 - 25 mg/dL    Creatinine 0 76 0 60 - 1 30 mg/dL    Glucose 66 65 - 140 mg/dL    Calcium 8 5 8 3 - 10 1 mg/dL    eGFR 74 ml/min/1 73sq m   Fingerstick Glucose (POCT)    Collection Time: 08/16/18  6:40 AM   Result Value Ref Range    POC Glucose 75 65 - 140 mg/dl   Fingerstick Glucose (POCT)    Collection Time: 08/16/18 10:47 AM   Result Value Ref Range    POC Glucose 116 65 - 140 mg/dl       Radiology Results: I have personally reviewed pertinent reports  Other Diagnostic Testing:   I have personally reviewed pertinent reports          Active Meds:   Current Facility-Administered Medications   Medication Dose Route Frequency    acetaminophen (TYLENOL) tablet 650 mg  650 mg Oral Q6H PRN    albuterol (PROVENTIL HFA,VENTOLIN HFA) inhaler 1 puff  1 puff Inhalation BID PRN    amiodarone tablet 200 mg  200 mg Oral Daily With Breakfast    amLODIPine (NORVASC) tablet 10 mg  10 mg Oral Daily    aspirin chewable tablet 81 mg  81 mg Oral Daily    atorvastatin (LIPITOR) tablet 80 mg  80 mg Oral Daily    calcium carbonate (TUMS) chewable tablet 1,000 mg  1,000 mg Oral TID    diphenhydrAMINE (BENADRYL) tablet 50 mg  50 mg Oral 60 Min Pre-Op    docusate sodium (COLACE) capsule 100 mg  100 mg Oral BID PRN    ferrous sulfate tablet 325 mg  325 mg Oral Every Other Day    fluticasone (FLONASE) 50 mcg/act nasal spray 1 spray  1 spray Nasal Daily    fluticasone (FLOVENT HFA) 110 MCG/ACT inhaler 2 puff  2 puff Inhalation Q12H [facility]    HYDROcodone-acetaminophen (NORCO) 5-325 mg per tablet 2 tablet  2 tablet Oral Q4H    HYDROmorphone (DILAUDID) injection 0 5 mg  0 5 mg Intravenous Q4H PRN    insulin glargine (LANTUS) subcutaneous injection 22 Units 0 22 mL  22 Units Subcutaneous Q12H [facility]    insulin lispro (HumaLOG) 100 units/mL subcutaneous injection 2-12 Units  2-12 Units Subcutaneous 4x Daily (AC & HS)    levETIRAcetam (KEPPRA) tablet 750 mg  750 mg Oral Q12H [facility]    levothyroxine tablet 100 mcg  100 mcg Oral Early Morning    lisinopril (ZESTRIL) tablet 5 mg  5 mg Oral Daily    loratadine (CLARITIN) tablet 10 mg  10 mg Oral Daily    magnesium oxide (MAG-OX) tablet 400 mg  400 mg Oral BID    metoclopramide (REGLAN) tablet 5 mg  5 mg Oral Q6H PRN    metoprolol tartrate (LOPRESSOR) partial tablet 12 5 mg  12 5 mg Oral Q12H [facility]    nitroglycerin (NITROSTAT) SL tablet 0 4 mg  0 4 mg Sublingual Q3 min PRN    pantoprazole (PROTONIX) EC tablet 20 mg  20 mg Oral Early Morning    polyethylene glycol (MIRALAX) packet 17 g  17 g Oral Daily PRN    rivaroxaban (XARELTO) tablet 15 mg  15 mg Oral Daily         VTE Pharmacologic Prophylaxis: Reason for no pharmacologic prophylaxis Xarelto  VTE Mechanical Prophylaxis: sequential compression device    Dorene Gonsalez DO

## 2018-08-16 NOTE — PLAN OF CARE
Problem: OCCUPATIONAL THERAPY ADULT  Goal: Performs self-care activities at highest level of function for planned discharge setting  See evaluation for individualized goals  Treatment Interventions: ADL retraining, Functional transfer training, Endurance training, UE strengthening/ROM, Patient/family training, Equipment evaluation/education, Compensatory technique education, Energy conservation, Activityengagement          See flowsheet documentation for full assessment, interventions and recommendations  Outcome: Progressing  Limitation: Decreased ADL status, Decreased endurance, Decreased high-level ADLs, Decreased self-care trans  Prognosis: Good  Assessment: Pt participated in skilled OT tx session focused on functional mobility/transfers, toileting, grooming  See above for further details on functional performance  Per orders pt has B/L sx shoes applied and is WBAT B/L LE  Pt lives at home alone and will need to perform ADL/IADLs at mod I level  Currently per chart review podiatry is following and planning for amputation vs BKA of (R) LE pending SHADI study  From OT standpoint, based on pt's current performance anticipate pt would progress and be appropriate to D/C home with home OT services  However, pt's medical/sx plan may impact pt's functional status  OT will continue to follow to address prior stated goals and make D/C recommendations pending pt's functional progress and medical plan        OT Discharge Recommendation:  (will continue to assess pending pt's medical plan)  OT - OK to Discharge:  (when medically stable)

## 2018-08-16 NOTE — PROGRESS NOTES
Physical Therapy Progress Note     08/16/18 1500   Pain Assessment   Pain Assessment 0-10   Pain Score 6   Pain Location Foot   Pain Orientation Bilateral   Restrictions/Precautions   Weight Bearing Precautions Per Order Yes   RLE Weight Bearing Per Order WBAT   LLE Weight Bearing Per Order WBAT   Braces or Orthoses Other (Comment)  (B surgical shoes)   Other Precautions Fall Risk;Pain   General   Chart Reviewed Yes   Response to Previous Treatment Patient with no complaints from previous session  Family/Caregiver Present No   Cognition   Overall Cognitive Status WFL   Arousal/Participation Alert   Attention Within functional limits   Orientation Level Oriented X4   Memory Within functional limits   Following Commands Follows one step commands without difficulty   Subjective   Subjective reports pain in B feet; no other c/o   Bed Mobility   Rolling R 5  Supervision   Additional items HOB elevated; Bedrails; Increased time required   Rolling L 5  Supervision   Additional items HOB elevated; Bedrails; Increased time required   Supine to Sit 5  Supervision   Additional items HOB elevated; Bedrails; Increased time required   Sit to Supine 5  Supervision   Additional items HOB elevated; Bedrails; Increased time required   Transfers   Sit to Stand 5  Supervision   Stand to Sit 5  Supervision   Stand pivot 5  Supervision   Additional items Other  (RW)   Toilet transfer 5  Supervision   Additional items Increased time required   Ambulation/Elevation   Gait pattern Decreased foot clearance; Antalgic;Narrow MEGAN; Forward Flexion   Gait Assistance 5  Supervision   Assistive Device Rolling walker   Distance 50   Stair Management Assistance Not tested   Ramp Technique Not tested   Balance   Static Sitting Normal   Dynamic Sitting Normal   Static Standing Fair +   Dynamic Standing Fair   Ambulatory Fair   Activity Tolerance   Activity Tolerance Patient limited by pain   Nurse Made Aware yes   Assessment   Prognosis Good   Problem List Decreased endurance;Decreased mobility; Decreased skin integrity;Pain   Assessment Pt supine in bed prior to session; able to roll L/R with S and no physical assist; supine <> sit EOB with S and increased time; amb 50' very slowly with S and RW +CF; antalgic and forward flexed; instructed x2 to not reach out and grab hallway HR; finished session with SPT to bedside recliner with alarms active and all needs in reach; recommend cont PT POC; Barriers to Discharge Inaccessible home environment;Decreased caregiver support   Goals   Patient Goals none stated   STG Expiration Date 08/21/18   Plan   Treatment/Interventions ADL retraining;Functional transfer training;LE strengthening/ROM; Elevations; Therapeutic exercise; Endurance training;Cognitive reorientation;Patient/family training;Equipment eval/education; Bed mobility;Gait training   Progress Progressing toward goals   PT Frequency 5x/wk   Recommendation   Recommendation Post acute IP rehab;Home with family support;Home PT   Equipment Recommended Romulo Senior, PTA

## 2018-08-16 NOTE — PROGRESS NOTES
Progress Note - Infectious Disease   Emerald Bryant [de-identified] y o  female MRN: [id number]  Unit/Bed#: -01 Encounter: [id number]      Impression/Recommendations:  1  Chronic bilateral foot ischemic ulcers  No clinical evidence of active infection or cellulitis  Consider chronic osteomyelitis of right 3rd toe  Erythema around left hallux more consistent with ischemic rubor  Positive wound cultures likely represent wound colonizers  Clinically stable without sepsis  Rec:  ? Continue to follow closely off antibiotics  ? Continue [address] per podiatry  ? Await final surgical plan from podiatry  ? Follow temperatures closely  ? Supportive care as per the primary service     2  PAD  Status PTA  Rec:  ? Close vascular surgery follow-up     The patient is stable from an ID standpoint  Discussed with the primary team  I will reassess the patient on   Please call in the interim with new questions  Antibiotics:  Off antibiotics #1    Subjective:  Patient seen on AM rounds  Denies fevers, chills, sweats, nausea, vomiting, or diarrhea  24 Hour Events:  No documented fevers, chills, sweats, nausea, vomiting, or diarrhea  Objective:  Vitals:  HR:  [54-55] 55  Resp:  [20] 20  BP: (141-151)/(61-73) 141/61  SpO2:  [95 %-97 %] 96 %  Temp (24hrs), Av 4 °F (36 9 °C), Min:98 2 °F (36 8 °C), Max:98 7 °F (37 1 °C)  Current: Temperature: 98 2 °F (36 8 °C)    Physical Exam:   General:  No acute distress  Psychiatric:  Awake and alert  Pulmonary:  Normal respiratory excursion without accessory muscle use  Abdomen:  Soft, nontender  Extremities:  No edema  Cool erythema bilateral distal feet with tenderness to light palpation  Skin:  No rashes    Lab Results:  I have personally reviewed pertinent labs      Results from last 7 days  Lab Units 18  0607 08/15/18  0540 18  0531   SODIUM mmol/L 139 138 137   POTASSIUM mmol/L 4 2 4 0 3 7   CHLORIDE mmol/L 105 105 104   CO2 mmol/L 29 27 27   ANION GAP mmol/L 5 6 6 BUN mg/dL 17 12 12   CREATININE mg/dL 0 76 0 67 0 68   EGFR ml/min/1 73sq m 74 83 83   GLUCOSE RANDOM mg/dL 66 75 81   CALCIUM mg/dL 8 5 8 1* 8 1*       Results from last 7 days  Lab Units 08/16/18  0607 08/15/18  0540 08/14/18  0530   WBC Thousand/uL 12 12* 10 38* 10 27*   HEMOGLOBIN g/dL 9 8* 9 3* 10 1*   PLATELETS Thousands/uL 244 220 232       Results from last 7 days  Lab Units 08/12/18  1225   GRAM STAIN RESULT  No polys seen  1+ Gram positive cocci in clusters  No polys seen  3+ Gram positive cocci in clusters   WOUND CULTURE  2+ Growth of Klebsiella oxytoca*  2+ Growth of Staphylococcus aureus*  2+ Growth of Citrobacter amalonaticus complex*  2+ Growth of Staphylococcus aureus*       Imaging Studies:   I have personally reviewed pertinent imaging study reports and images in PACS  EKG, Pathology, and Other Studies:   I have personally reviewed pertinent reports

## 2018-08-16 NOTE — OCCUPATIONAL THERAPY NOTE
[address] Progress Note     Patient Name: Curtis Matos  FWCTE'S Date: 8/16/2018  Problem List  Patient Active Problem List   Diagnosis    Essential hypertension    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 native heart without angina pectoris    Type 2 diabetes mellitus with complication, with long-term current use of insulin (HCC)    Atrial flutter (HCC)    Hyperlipidemia    Gastroesophageal reflux disease without esophagitis    Moderate mitral stenosis    Arthritis of hand    Acute respiratory failure with hypoxia ([facility] [address] )    Asthma    Asymptomatic carotid artery stenosis, bilateral    Atherosclerosis of native artery of both lower extremities with bilateral ulceration (HCC)    Hx of CABG    Chronic anticoagulation    Chronic combined systolic and diastolic congestive heart failure (HCC)    Degenerative joint disease involving multiple joints    Diabetic retinopathy (HCC)    Postoperative hypothyroidism    Migraine headache    Nephrolithiasis    Osteoarthritis of both knees    Paroxysmal atrial fibrillation (HCC)    Ulcer of toe of left foot (HCC)    Ulcer of toe of right foot (HCC)    Prolonged Q-T interval on ECG    Hypokalemia    Hypocalcemia    Left bundle branch block (LBBB)    1st degree AV block    S/P TAVR (transcatheter aortic valve replacement)    Expressive aphasia    Multiple lacunar infarcts (HCC)    Vomiting    History of CVA (cerebrovascular accident)    Seizure ([facility] [address] )    Supratherapeutic INR    NSVT (nonsustained ventricular tachycardia) (HCC)    Pulmonary hypertension (HCC)    Anemia    Severe sepsis (HCC)    Lactic acidosis    Hypomagnesemia    Bradycardia    Chest pain    Foot pain    PAD (peripheral artery disease) (HCC)    Cellulitis    CKD (chronic kidney disease) stage 3, GFR 30-59 ml/min           08/16/18 3689   Restrictions/Precautions   Weight Bearing Precautions Per Order Yes   RLE Weight Bearing Per Order WBAT LLE Weight Bearing Per Order WBAT   Braces or Orthoses Other (Comment)  (B/L sx shoes )   Lifestyle   Autonomy "My left foot really hurts"    Pain Assessment   Pain Assessment 0-10   Pain Score 8   Pain Type Acute pain   Pain Location Foot   Pain Orientation Left   Hospital Pain Intervention(s) Repositioned   Response to Interventions pt tolerates functional mobility with sx shoe donned    ADL   Grooming Assistance 5  Supervision/Setup   Grooming Deficit Wash/dry hands; Increased time to complete;Standing with assistive device   Grooming Comments in stance at sink    [address]  5  Supervision/Setup   Toileting Deficit Increased time to complete;Clothing management up;Clothing management down;Perineal hygiene;Supervison/safety   Toileting Comments pt completes clothing management in stance with increased time    Bed Mobility   Supine to Sit 5  Supervision   Transfers   Sit to Stand 5  Supervision   Stand to Sit 5  Supervision   Toilet transfer 5  Supervision   Additional items Increased time required;Commode   Functional Mobility   Functional Mobility 5  Supervision   Additional Comments pt completes functional mobility within room/bathroom with supervision and use of RW    Additional items Rolling walker   Cognition   Overall Cognitive Status WFL   Following Commands Follows one step commands without difficulty   Activity Tolerance   Activity Tolerance Patient tolerated treatment well   Assessment   Assessment Pt participated in skilled OT tx session focused on functional mobility/transfers, toileting, grooming  See above for further details on functional performance  Per orders pt has B/L sx shoes applied and is WBAT B/L LE  Pt lives at home alone and will need to perform ADL/IADLs at mod I level  Currently per chart review podiatry is following and planning for amputation vs BKA of (R) LE pending SHADI study   From OT standpoint, based on pt's current performance anticipate pt would progress and be appropriate to D/C home with home OT services  However, pt's medical/sx plan may impact pt's functional status  OT will continue to follow to address prior stated goals and make D/C recommendations pending pt's functional progress and medical plan  Plan   Treatment Interventions ADL retraining;Functional transfer training; Endurance training;Patient/family training;Equipment evaluation/education; Compensatory technique education; Energy conservation; Activityengagement   Goal Expiration Date 08/24/18   Treatment Day 1   OT Frequency 3-5x/wk   Recommendation   OT Discharge Recommendation (will continue to assess pending pt's medical plan)   Barthel Index   Feeding 10   Bathing 5   Grooming Score 5   Dressing Score 10   Bladder Score 10   Bowels Score 10   Toilet Use Score 10   Transfers (Bed/Chair) Score 10   Mobility (Level Surface) Score 0   Stairs Score 0   Barthel Index Score 70   Modified Rae Scale   Modified Darke Scale 3       Vanessa Flower MS, OTR/L , CBIS

## 2018-08-16 NOTE — PROGRESS NOTES
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, Right medial 1st metatarsal head wound   2  Left medial hallux wound, left 2nd dorsal digit eschar   3  PAD  4  DM3  5  CKD, stage 3    Plan:   - Patient is stable from podiatry standpoint for discharge since right lower extremity vascular studies and right foot surgery can be done as an outpatient  - Patient is scheduled for right lower extremity vascular studies at [address] on 08/20/18 at 7:15pm    - Patient will require transmetatarsal amputation vs  BKA pending lower extremity arterial dopplers and vascular recommendations - this was discussed with patient at bedside and son Felipe Walsh on the telephone    - B/l lateral foot wounds are stable with no acute signs of infection noted  - Left medial 1st metatarsal head and left medial hallux wounds dressed with Betadine and DSD   - Right medial 1st metatarsal head wound was dressed with Dermagran gauze, right 3rd digit was painted with betadine and dressed with DSD  - Continue local wound b/l foot and monitor closely   - Patient is to weight bear as tolerated with surgical shoe bilaterally  - Follow up instructions in chart  Subjective/Objective   Chief Complaint:   Chief Complaint   Patient presents with    Foot Pain     pt comes from PCP with bilatal foot pain, venous ulcers present on both feet       Subjective: [de-identified] y o  y/o female was seen and evaluated at bedside  Patient was resting comfortably in bed  Denies N/V/F/SOB today  Blood pressure 141/61, pulse 55, temperature 98 2 °F (36 8 °C), temperature source Oral, resp  rate 20, height 5' 4" (1 626 m), weight 73 1 kg (161 lb 1 6 oz), SpO2 96 %, not currently breastfeeding  ,Body mass index is 27 65 kg/m²      Invasive Devices     Peripheral Intravenous Line            Peripheral IV 08/14/18 Right Antecubital 1 day                Physical Exam:   General: Alert, cooperative and no distress  Lungs: Non labored breathing  Heart: Positive S1, S2  Abdomen: Soft, non-tender  Extremity: Right foot with ulcer medial right 1st metatarsal head with 100% fibrotic base, no malodor, no signs of infection, does not probe to muscle, tendon, bone  3rd toe right foot with dry stable gangrene with exposed distal phalanx  Left medial 1st metatarsal head and left medial hallux with dry adhered eschar, toes are cool to touch and painful with rubor on dependency to the right foot, continues to be stable with no signs of infection  Lab, Imaging and other studies:   CBC:   Lab Results   Component Value Date    WBC 12 12 (H) 08/16/2018    HGB 9 8 (L) 08/16/2018    HCT 32 0 (L) 08/16/2018    MCV 78 (L) 08/16/2018     08/16/2018    MCH 24 0 (L) 08/16/2018    MCHC 30 6 (L) 08/16/2018    RDW 18 0 (H) 08/16/2018    MPV 10 1 08/16/2018    NRBC 0 08/16/2018   , CMP:   Lab Results   Component Value Date     08/16/2018    K 4 2 08/16/2018     08/16/2018    CO2 29 08/16/2018    ANIONGAP 5 08/16/2018    BUN 17 08/16/2018    CREATININE 0 76 08/16/2018    GLUCOSE 66 08/16/2018    CALCIUM 8 5 08/16/2018    EGFR 74 08/16/2018       Imaging: I have personally reviewed pertinent films in PACS  EKG, Pathology, and Other Studies: I have personally reviewed pertinent reports    VTE Pharmacologic Prophylaxis: Heparin

## 2018-08-16 NOTE — PLAN OF CARE
Problem: PHYSICAL THERAPY ADULT  Goal: Performs mobility at highest level of function for planned discharge setting  See evaluation for individualized goals  Treatment/Interventions: Functional transfer training, LE strengthening/ROM, Therapeutic exercise, Endurance training, Patient/family training, Equipment eval/education, Bed mobility, Gait training, Spoke to nursing, Spoke to case management  Equipment Recommended: Brittney Vargas       See flowsheet documentation for full assessment, interventions and recommendations  Outcome: Progressing  Prognosis: Good  Problem List: Decreased endurance, Decreased mobility, Decreased skin integrity, Pain  Assessment: Pt supine in bed prior to session; able to roll L/R with S and no physical assist; supine <> sit EOB with S and increased time; amb 50' very slowly with S and RW +CF; antalgic and forward flexed; instructed x2 to not reach out and grab hallway HR; finished session with SPT to bedside recliner with alarms active and all needs in reach; recommend cont PT POC; Barriers to Discharge: Inaccessible home environment, Decreased caregiver support  Barriers to Discharge Comments: Lives alone   Recommendation: Post acute IP rehab, Home with family support, Home PT          See flowsheet documentation for full assessment

## 2018-08-17 ENCOUNTER — APPOINTMENT (INPATIENT)
Dept: NON INVASIVE DIAGNOSTICS | Facility: HOSPITAL | Age: 78
DRG: 252 | End: 2018-08-17
Payer: MEDICARE

## 2018-08-17 LAB
GLUCOSE SERPL-MCNC: 143 MG/DL (ref 65–140)
GLUCOSE SERPL-MCNC: 158 MG/DL (ref 65–140)
GLUCOSE SERPL-MCNC: 214 MG/DL (ref 65–140)
GLUCOSE SERPL-MCNC: 57 MG/DL (ref 65–140)
GLUCOSE SERPL-MCNC: 83 MG/DL (ref 65–140)

## 2018-08-17 PROCEDURE — 99232 SBSQ HOSP IP/OBS MODERATE 35: CPT | Performed by: SURGERY

## 2018-08-17 PROCEDURE — 93971 EXTREMITY STUDY: CPT | Performed by: SURGERY

## 2018-08-17 PROCEDURE — 82948 REAGENT STRIP/BLOOD GLUCOSE: CPT

## 2018-08-17 PROCEDURE — 93971 EXTREMITY STUDY: CPT

## 2018-08-17 PROCEDURE — 99232 SBSQ HOSP IP/OBS MODERATE 35: CPT | Performed by: INTERNAL MEDICINE

## 2018-08-17 PROCEDURE — 99222 1ST HOSP IP/OBS MODERATE 55: CPT | Performed by: INTERNAL MEDICINE

## 2018-08-17 PROCEDURE — 99233 SBSQ HOSP IP/OBS HIGH 50: CPT | Performed by: INTERNAL MEDICINE

## 2018-08-17 PROCEDURE — 97530 THERAPEUTIC ACTIVITIES: CPT

## 2018-08-17 RX ORDER — GABAPENTIN 100 MG/1
100 CAPSULE ORAL
Status: DISCONTINUED | OUTPATIENT
Start: 2018-08-17 | End: 2018-08-20

## 2018-08-17 RX ORDER — ACETAMINOPHEN 325 MG/1
975 TABLET ORAL EVERY 8 HOURS SCHEDULED
Status: DISCONTINUED | OUTPATIENT
Start: 2018-08-17 | End: 2018-08-24 | Stop reason: HOSPADM

## 2018-08-17 RX ORDER — OXYCODONE HYDROCHLORIDE 5 MG/1
5 TABLET ORAL EVERY 4 HOURS PRN
Status: DISCONTINUED | OUTPATIENT
Start: 2018-08-17 | End: 2018-08-24 | Stop reason: HOSPADM

## 2018-08-17 RX ORDER — SODIUM PHOSPHATE, DIBASIC AND SODIUM PHOSPHATE, MONOBASIC 7; 19 G/133ML; G/133ML
1 ENEMA RECTAL ONCE
Status: COMPLETED | OUTPATIENT
Start: 2018-08-17 | End: 2018-08-17

## 2018-08-17 RX ADMIN — ATORVASTATIN CALCIUM 80 MG: 80 TABLET, FILM COATED ORAL at 08:40

## 2018-08-17 RX ADMIN — INSULIN LISPRO 2 UNITS: 100 INJECTION, SOLUTION INTRAVENOUS; SUBCUTANEOUS at 16:36

## 2018-08-17 RX ADMIN — RIVAROXABAN 15 MG: 15 TABLET, FILM COATED ORAL at 08:37

## 2018-08-17 RX ADMIN — LEVETIRACETAM 750 MG: 750 TABLET ORAL at 08:37

## 2018-08-17 RX ADMIN — LORATADINE 10 MG: 10 TABLET ORAL at 08:37

## 2018-08-17 RX ADMIN — ASPIRIN 81 MG 81 MG: 81 TABLET ORAL at 08:39

## 2018-08-17 RX ADMIN — ACETAMINOPHEN 975 MG: 325 TABLET, FILM COATED ORAL at 21:12

## 2018-08-17 RX ADMIN — HYDROCODONE BITARTRATE AND ACETAMINOPHEN 2 TABLET: 5; 325 TABLET ORAL at 02:30

## 2018-08-17 RX ADMIN — INSULIN GLARGINE 22 UNITS: 100 INJECTION, SOLUTION SUBCUTANEOUS at 21:12

## 2018-08-17 RX ADMIN — AMLODIPINE BESYLATE 10 MG: 10 TABLET ORAL at 08:37

## 2018-08-17 RX ADMIN — HYDROCODONE BITARTRATE AND ACETAMINOPHEN 2 TABLET: 5; 325 TABLET ORAL at 06:31

## 2018-08-17 RX ADMIN — FLUTICASONE PROPIONATE 1 SPRAY: 50 SPRAY, METERED NASAL at 08:40

## 2018-08-17 RX ADMIN — METOPROLOL TARTRATE 12.5 MG: 25 TABLET ORAL at 21:13

## 2018-08-17 RX ADMIN — SODIUM PHOSPHATE 1 ENEMA: 7; 19 ENEMA RECTAL at 20:27

## 2018-08-17 RX ADMIN — OXYCODONE HYDROCHLORIDE 5 MG: 5 TABLET ORAL at 22:06

## 2018-08-17 RX ADMIN — HYDROCODONE BITARTRATE AND ACETAMINOPHEN 2 TABLET: 5; 325 TABLET ORAL at 11:26

## 2018-08-17 RX ADMIN — LEVOTHYROXINE SODIUM 100 MCG: 100 TABLET ORAL at 06:31

## 2018-08-17 RX ADMIN — HYDROCODONE BITARTRATE AND ACETAMINOPHEN 2 TABLET: 5; 325 TABLET ORAL at 15:24

## 2018-08-17 RX ADMIN — PANTOPRAZOLE SODIUM 20 MG: 20 TABLET, DELAYED RELEASE ORAL at 06:31

## 2018-08-17 RX ADMIN — Medication 400 MG: at 17:51

## 2018-08-17 RX ADMIN — INSULIN GLARGINE 22 UNITS: 100 INJECTION, SOLUTION SUBCUTANEOUS at 08:36

## 2018-08-17 RX ADMIN — CEFAZOLIN SODIUM 1000 MG: 1 SOLUTION INTRAVENOUS at 16:38

## 2018-08-17 RX ADMIN — LISINOPRIL 5 MG: 5 TABLET ORAL at 08:37

## 2018-08-17 RX ADMIN — LEVETIRACETAM 750 MG: 750 TABLET ORAL at 21:13

## 2018-08-17 RX ADMIN — POLYETHYLENE GLYCOL 3350 17 G: 17 POWDER, FOR SOLUTION ORAL at 17:51

## 2018-08-17 RX ADMIN — FLUTICASONE PROPIONATE 2 PUFF: 110 AEROSOL, METERED RESPIRATORY (INHALATION) at 08:41

## 2018-08-17 RX ADMIN — INSULIN LISPRO 4 UNITS: 100 INJECTION, SOLUTION INTRAVENOUS; SUBCUTANEOUS at 21:14

## 2018-08-17 RX ADMIN — AMIODARONE HYDROCHLORIDE 200 MG: 200 TABLET ORAL at 08:37

## 2018-08-17 RX ADMIN — FLUTICASONE PROPIONATE 2 PUFF: 110 AEROSOL, METERED RESPIRATORY (INHALATION) at 21:19

## 2018-08-17 RX ADMIN — Medication 400 MG: at 08:37

## 2018-08-17 RX ADMIN — POLYETHYLENE GLYCOL 3350 17 G: 17 POWDER, FOR SOLUTION ORAL at 08:36

## 2018-08-17 RX ADMIN — GABAPENTIN 100 MG: 100 CAPSULE ORAL at 21:13

## 2018-08-17 RX ADMIN — DOCUSATE SODIUM 100 MG: 100 CAPSULE, LIQUID FILLED ORAL at 17:51

## 2018-08-17 RX ADMIN — METOPROLOL TARTRATE 12.5 MG: 25 TABLET ORAL at 08:38

## 2018-08-17 RX ADMIN — DOCUSATE SODIUM 100 MG: 100 CAPSULE, LIQUID FILLED ORAL at 08:37

## 2018-08-17 NOTE — PROGRESS NOTES
Progress Note - Infectious Disease   Emerald Bryant [de-identified] y o  female MRN: [id number]  Unit/Bed#: -01 Encounter: [id number]      Impression/Plan:  1   Chronic bilateral foot ischemic ulcers with possible left foot cellulitis  Now with possible progressive erythema, swelling, and pain involving left forefoot  Consideration for the possibility of a secondary the cellulitis although I suspect the most significant component is ischemic  Consider chronic osteomyelitis of right 3rd toe  Erythema around left hallux more consistent with ischemic rubor  Positive wound cultures likely represent wound colonizers  Clinically stable without sepsis  Vascular and Podiatry concerned that this is a progressive infection  Rec:  ? Will begin cefazolin 1 g IV q 8 hours  ? Vein mapping in anticipation of revascularization  ? Continue [address] per podiatry  ? Await final surgical plan from vascular surgery and podiatry  ? Follow temperatures closely  ? Supportive care as per the primary service     2   PAD  Status PTA  Rec:  ? Close vascular surgery follow-up  ? Vein mapping  ? Likely needs revascularization of the left lower extremity    Discussed in detail with Podiatry and vascular surgery     Antibiotics:  None    Subjective:  Asked to re-evaluate the patient with increasing left foot pain and erythema as well as swelling  Patient has no fever, chills, sweats; no nausea, vomiting, diarrhea; no cough, shortness of breath  Objective:  Vitals:  HR:  [56-58] 56  Resp:  [18] 18  BP: (140-164)/(64-90) 140/90  SpO2:  [95 %-97 %] 97 %  Temp (24hrs), Av 1 °F (36 7 °C), Min:97 7 °F (36 5 °C), Max:98 5 °F (36 9 °C)  Current: Temperature: 98 2 °F (36 8 °C)    Physical Exam:   General Appearance:  Alert, interactive, nontoxic, no acute distress  Throat: Oropharynx moist without lesions      Lungs:   Clear to auscultation bilaterally; no wheezes, rhonchi or rales; respirations unlabored   Heart:  Bradycardic; no murmur, rub or gallop Abdomen:   Soft, non-tender, non-distended, positive bowel sounds  Extremities: No clubbing, cyanosis  Left forefoot edema with erythema proximal to the hallux with some faint erythema involving the calf  Bilateral foot ulcerations without purulence  Skin: No new rashes or lesions  No draining wounds noted         Labs, Imaging, & Other studies:   All pertinent labs and imaging studies were personally reviewed    Results from last 7 days  Lab Units 08/16/18  0607 08/15/18  0540 08/14/18  0530   WBC Thousand/uL 12 12* 10 38* 10 27*   HEMOGLOBIN g/dL 9 8* 9 3* 10 1*   PLATELETS Thousands/uL 244 220 232       Results from last 7 days  Lab Units 08/16/18  0607 08/15/18  0540 08/13/18  0531   SODIUM mmol/L 139 138 137   POTASSIUM mmol/L 4 2 4 0 3 7   CHLORIDE mmol/L 105 105 104   CO2 mmol/L 29 27 27   ANION GAP mmol/L 5 6 6   BUN mg/dL 17 12 12   CREATININE mg/dL 0 76 0 67 0 68   EGFR ml/min/1 73sq m 74 83 83   GLUCOSE RANDOM mg/dL 66 75 81   CALCIUM mg/dL 8 5 8 1* 8 1*       Results from last 7 days  Lab Units 08/12/18  1225   GRAM STAIN RESULT  No polys seen  1+ Gram positive cocci in clusters  No polys seen  3+ Gram positive cocci in clusters   WOUND CULTURE  2+ Growth of Klebsiella oxytoca*  2+ Growth of Staphylococcus aureus*  2+ Growth of Citrobacter amalonaticus complex*  2+ Growth of Staphylococcus aureus*

## 2018-08-17 NOTE — PLAN OF CARE
Problem: Potential for Falls  Goal: Patient will remain free of falls  INTERVENTIONS:  - Assess patient frequently for physical needs  -  Identify cognitive and physical deficits and behaviors that affect risk of falls  -  [place] fall precautions as indicated by assessment   - Educate patient/family on patient safety including physical limitations  - Instruct patient to call for assistance with activity based on assessment  - Modify environment to reduce risk of injury  - Consider OT/PT consult to assist with strengthening/mobility   Outcome: Progressing      Problem: Prexisting or High Potential for Compromised Skin Integrity  Goal: Skin integrity is maintained or improved  INTERVENTIONS:  - Identify patients at risk for skin breakdown  - Assess and monitor skin integrity  - Assess and monitor nutrition and hydration status  - Monitor labs (i e  albumin)  - Assess for incontinence   - Turn and reposition patient  - Assist with mobility/ambulation  - Relieve pressure over bony prominences  - Avoid friction and shearing  - Provide appropriate hygiene as needed including keeping skin clean and dry  - Evaluate need for skin moisturizer/barrier cream  - Collaborate with interdisciplinary team (i e  Nutrition, Rehabilitation, etc )   - Patient/family teaching   Outcome: Progressing      Problem: Nutrition/Hydration-ADULT  Goal: Nutrient/Hydration intake appropriate for improving, restoring or maintaining nutritional needs  Monitor and assess patient's nutrition/hydration status for malnutrition (ex- brittle hair, bruises, dry skin, pale skin and conjunctiva, muscle wasting, smooth red tongue, and disorientation)  Collaborate with interdisciplinary team and initiate plan and interventions as ordered  Monitor patient's weight and dietary intake as ordered or per policy  Utilize nutrition screening tool and intervene per policy   Determine patient's food preferences and provide high-protein, high-caloric foods as appropriate  INTERVENTIONS:  - Monitor oral intake, urinary output, labs, and treatment plans  - Assess nutrition and hydration status and recommend course of action  - Evaluate amount of meals eaten  - Assist patient with eating if necessary   - Allow adequate time for meals  - Recommend/ encourage appropriate diets, oral nutritional supplements, and vitamin/mineral supplements  - Order, calculate, and assess calorie counts as needed  - Recommend, monitor, and adjust tube feedings and TPN/PPN based on assessed needs  - Assess need for intravenous fluids  - Provide specific nutrition/hydration education as appropriate  - Include patient/family/caregiver in decisions related to nutrition   Outcome: Progressing      Problem: DISCHARGE PLANNING - CARE MANAGEMENT  Goal: Discharge to post-acute care or home with appropriate resources  INTERVENTIONS:  - Conduct assessment to determine patient/family and health care team treatment goals, and need for post-acute services based on payer coverage, community resources, and patient preferences, and barriers to discharge  - Address psychosocial, clinical, and financial barriers to discharge as identified in assessment in conjunction with the patient/family and health care team  - Arrange appropriate level of post-acute services according to patient's   needs and preference and payer coverage in collaboration with the physician and health care team  - Communicate with and update the patient/family, physician, and health care team regarding progress on the discharge plan  - Arrange appropriate transportation to post-acute venues  - Discharge to home/facility when medically cleared     Admit Dx Bilat Toe Wounds / Cellulitis  Hx PVD, CHF, DM2, Seizures  Pt expressed undestanding of her diagnoses and treatment regimens  Pt has glucometer and scale for mgmt of CHF and DM  Pt denied any dc needs  Pend med progression; poss re-eval of needs      Outcome: Progressing

## 2018-08-17 NOTE — PROGRESS NOTES
Occupational Therapy Treatment Note       08/17/18 0925   Restrictions/Precautions   Weight Bearing Precautions Per Order Yes   RLE Weight Bearing Per Order WBAT   LLE Weight Bearing Per Order WBAT   Braces or Orthoses (b/l SX  shoes)   Other Precautions Fall Risk;Pain   Lifestyle   Autonomy "I cannot go home and I cannot stay with my family"    Pain Assessment   Pain Score Worst Possible Pain   Pain Type Acute pain   Pain Location Foot   Pain Orientation Left   Hospital Pain Intervention(s) Repositioned   Response to Interventions unchanged    ADL   LB Dressing Assistance 5  Supervision/Setup   LB Dressing Deficit Don/doff R shoe;Don/doff L shoe  (sx shoes )   LB Dressing Comments while seated using cross leg technique    Transfers   Sit to Stand 5  Supervision   Stand to Sit 5  Supervision   Toilet transfer 5  Supervision   Additional items Increased time required;Commode   Functional Mobility   Functional Mobility 5  Supervision   Additional Comments pt completes functional mobility/transfers within room/bathroom with supervision and use of RW  Pt requires increased time 2* c/o (L) foot pain    Additional items Rolling walker   Cognition   Overall Cognitive Status WFL   Activity Tolerance   Activity Tolerance Patient limited by pain   Medical Staff Made Aware NATE Auguste    Assessment   Assessment Pt participated in skilled OT tx session focused on functional mobility/transfers, toilet transfers, LB dressing  See above for further details on functional performance  Pt able to complete at supervision level with increased time 2* c/o (L) foot pain  Pt is WBAT B/L sx shoes  Pt is limited in baseline functioning of ADL/IADL at mod I level 2* LE pain, impaired activity tolerance, impaired standing tolerance and dynamic standing balance  Per chart review and SOD resident pt was planned for possible sx pending SHADI studies, however now may receive ABIs as an outpatient and may be ready for D/C today   Pt limited in ability to D/C home 2* the above mentioned deficits, as well as limited family support and inaccessible home setup, therefore recommend inpatient rehab upon D/C as pt will need to be at mod I level for ADL and household IADL prior to D/C home alone  OT will continue to follow to address prior stated goals and assess D/C plan pending pt's functional progress and medical plan  Plan   Treatment Interventions ADL retraining;Functional transfer training; Endurance training;Patient/family training;Equipment evaluation/education; Compensatory technique education;Continued evaluation; Energy conservation; Activityengagement   Goal Expiration Date 08/24/18   Treatment Day 2   OT Frequency 3-5x/wk   Recommendation   OT Discharge Recommendation Short Term Rehab   Barthel Index   Feeding 10   Bathing 0   Grooming Score 5   Dressing Score 5   Bladder Score 10   Bowels Score 10   Toilet Use Score 5   Transfers (Bed/Chair) Score 10   Mobility (Level Surface) Score 0   Stairs Score 0   Barthel Index Score 55   Modified Rae Scale   Modified Johnson Scale 3       Vanessa Flower MS, OTR/L , CBIS

## 2018-08-17 NOTE — PROGRESS NOTES
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, Right medial 1st metatarsal head wound   2  Left medial hallux wound, left 2nd dorsal digit eschar   3  PAD  4  DM3  5  CKD, stage 3    Plan:  - B/l lateral foot wounds are stable with no acute signs of infection noted  - Left medial 1st metatarsal head and left medial hallux wounds dressed with Betadine and DSD   - Right medial 1st metatarsal head wound was dressed with Dermagran gauze, right 3rd digit was painted with betadine and dressed with DSD  - Possibly vascular lower extremity arterial studies will be done on Monday, upon reviewing SHADI, met pressure and toe pressure on the right, will make definitive plan regarding surgical interventions  - Patient will require transmetatarsal amputation vs  BKA pending lower extremity arterial dopplers and vascular recommendations - this was discussed with patient at bedside and son Stefany Jallohjason on the telephone    - Vascular is on board for possible left lower extremity work up  - ID on board patient currently off of antibiotics but is following   - Continue local wound b/l foot and monitor closely   - Patient is to weight bear as tolerated with surgical shoe bilaterally  Subjective/Objective   Chief Complaint:   Chief Complaint   Patient presents with    Foot Pain     pt comes from PCP with bilatal foot pain, venous ulcers present on both feet       Subjective: [de-identified] y o  y/o female was seen and evaluated at bedside  Patient was sitting in chair comfortably while watching TV  Patient complains of pain on the left foot with swelling and redness  Patient denies N/V/F/SOB  Blood pressure 144/64, pulse 58, temperature 97 7 °F (36 5 °C), temperature source Oral, resp   rate 18, height 5' 4" (1 626 m), weight 73 1 kg (161 lb 1 6 oz), SpO2 96 %, not currently breastfeeding  ,Body mass index is 27 65 kg/m²  Invasive Devices     Peripheral Intravenous Line            Peripheral IV 08/14/18 Right Antecubital 2 days                Physical Exam:   General: Alert, cooperative and no distress  Lungs: Non labored breathing  Heart: Positive S1, S2  Abdomen: Soft, non-tender  Extremity:  Rt foot w/ ulcer medial right 1st met head with fibrotic base, no malodor, no signs of infection, does not probe to bone, muscle, tendon  3rd toe right foot with dry stable gangrene with exposed distal phalanx  Left medial 1st met head and left medial hallux with dry adhered eschar, toes are cool to touch  Left foot edematous and erythematous with pain  Lab, Imaging and other studies:   CBC: No results found for: WBC, HGB, HCT, MCV, PLT, ADJUSTEDWBC, MCH, MCHC, RDW, MPV, NRBC, CMP: No results found for: NA, K, CL, CO2, ANIONGAP, BUN, CREATININE, GLUCOSE, CALCIUM, AST, ALT, ALKPHOS, PROT, ALBUMIN, BILITOT, EGFR    Imaging: I have personally reviewed pertinent films in PACS  EKG, Pathology, and Other Studies: I have personally reviewed pertinent reports    VTE Pharmacologic Prophylaxis: Heparin

## 2018-08-17 NOTE — CONSULTS
Consultation - Inpatient Pain Management   Hira Flores [de-identified] y o  female MRN: [id number]  Unit/Bed#: -01 Encounter: [id number]               Assessment/Plan     Assessment:     Principal Problem:    Cellulitis  Active Problems:    Essential hypertension    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 native heart without angina pectoris    Type 2 diabetes mellitus with complication, with long-term current use of insulin (HCC)    Atrial flutter (HCC)    Hyperlipidemia    Gastroesophageal reflux disease without esophagitis    Chronic combined systolic and diastolic congestive heart failure (HCC)    Paroxysmal atrial fibrillation (HCC)    Ulcer of toe of left foot (HCC)    Ulcer of toe of right foot (HCC)    History of CVA (cerebrovascular accident)    Seizure ([facility] [address] )    Anemia    PAD (peripheral artery disease) ([facility])    CKD (chronic kidney disease) stage 3, GFR 30-59 ml/min      Plan/Recommendations:   Acute left foot pain/ischemic pain  · Acetaminophen 975mg Q8 hours  · Discontinue scheduled Norco  · Gabapentin 100mg QHS  · Discontinue IV Dilaudid   · Oxycodone 5mg Q4 hours PRN breakthrough pain    Bowel Management  · Colace 100mg BID  · Miralax BID  · Would recommend the addition of Senna to the bowel regimen; no recent BM    Will see again on Monday  Reviewed with SOD-C      History of Present Illness    Admit Date:  8/8/2018  Hospital Day:  8 days  Primary Service:  General Medicine  Attending Provider:  Nazia Mccullough MD  Physician Requesting Consult: Nazia Mccullough MD  Reason for Consult / Principal Problem: acute on chronic pain   HPI: Hira Flores is a [de-identified]y o  year old female who presents with increased pain and redness of her left foot; history of chronic b/l foot ischemic ulcers  Podiatry and Vascular services are following  LE limb vein mapping done today  Definitive treatment planning in process, transmetatarsal amputation vs BKA  ID also following      Review of Systems:  Denied headache  + dizziness this morning after sitting up in bed, now resolved  Denied chest pain and SOB  Denied abdominal pain  + constipation, LBM 4-5 days ago    Pain History:  Pain History: Denied history of chronic pain or opioid use  I have reviewed the patient's controlled substance dispensing history in the Prescription Drug Monitoring Program in compliance with the [facility] regulations before recommending any controlled substances  Tramadol 50mg #120/30days filled on 6/27/2018; Prescribed by Dr Oracio Quan for left foot pain  Patient reports tramadol was not effective and therefore did not continue taking the medication  Current pain location(s): left foot  Pain Scale:   0-10  Severity:  severe  Quality: sharp, stabbing  Treatment History:  Patient is sitting in chair, appears comfortable at rest  Left foot pain is most severe when she crosses her legs and at the sole of her foot  Scheduled Norco is mildly effective and takes the edge off her pain      Current Analgesic regimen:  Norco 5/325mg 2 tablets every 4 hours scheduled, Tylenol PRN, Dilaudid 0 5mg IV Q6 hours PRN (zero)  Bowel Regimen: Colace BID, Miralax BID    Historical Information   Past Medical History:   Diagnosis Date    Altered mental status     Anemia     Anticoagulated     Xarelto for Afib/ flutter    Asthma     Atrial flutter (HCC)     maintained on anticoag w/ Xarelto    Bradycardia     CAD (coronary artery disease)     Candidiasis     Carotid stenosis, bilateral     Cataract     Chest pain     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) (HCC)     Chronic fatigue syndrome     Chronic low back pain     Chronic ulcer of toes (HCC)     left and right    Concussion w loss of consciousness of unsp duration, init     CVA (cerebral vascular accident) ([facility] [address] ) 4/17/2018    Diabetes mellitus ([facility] [address] )     type 2, insulin dependent    DJD (degenerative joint disease)     Expressive aphasia     Foot pain     GERD (gastroesophageal reflux disease)     Herpes zoster     HLD (hyperlipidemia)     HTN (hypertension)     Hypothyroidism     Irritable bowel syndrome     Kidney stone     Lumbar radiculopathy     Lyme disease     Dx in hospital 8/2011    MI (myocardial infarction) ([facility] [address] )     Migraines     Muscle spasm     Non-neoplastic nevus     Nontoxic multinodular goiter     NSVT (nonsustained ventricular tachycardia) (HCC)     Osteoarthritis     Other chronic pain     PAD (peripheral artery disease) (HCC)     Palpitations     Paroxysmal atrial fibrillation (HCC)     Pseudogout     Pulmonary hypertension (HCC)     S/P TAVR (transcatheter aortic valve replacement)     Seizures (HCC)     Severe sepsis ([facility] [address] )     Spinal stenosis     Stroke ([facility] 05/2018    Transient cerebral ischemia     Trigger ring finger     Viral gastroenteritis      Past Surgical History:   Procedure Laterality Date    APPENDECTOMY      ARTERIOGRAM  12/19/2017    CARDIAC CATHETERIZATION      CHOLECYSTECTOMY      COLONOSCOPY      CORONARY ARTERY BYPASS GRAFT  2004    IR ABDOMINAL ANGIOGRAPHY / INTERVENTION  8/10/2018    LUMBAR LAMINECTOMY      CA ECHO TRANSESOPHAG R-T 2D W/PRB IMG ACQUISJ I&R N/A 4/3/2018    Procedure: TRANSESOPHAGEAL ECHOCARDIOGRAM (ROBB);   Surgeon: Davis Gardy DO;  Location: [facility];  Service: Cardiac Surgery    CA REPLACE AORTIC VALVE OPENFEMORAL ARTERY APPROACH N/A 4/3/2018    Procedure: REPLACEMENT AORTIC VALVE TRANSCATHETER (TAVR) TRANSFEMORAL w/ 26MM IVY YEVGENIY S3 VALVE;  Surgeon: Davis Grady DO;  Location: [facility];  Service: Cardiac Surgery    THYROIDECTOMY      TOTAL ABDOMINAL HYSTERECTOMY W/ BILATERAL SALPINGOOPHORECTOMY       Social History   History   Alcohol Use No     History   Drug Use No     History   Smoking Status    Never Smoker   Smokeless Tobacco    Never Used     Family History: non-contributory    Meds/Allergies   Prior to Admission Medications  Prescriptions Prior to Admission   Medication    amiodarone 200 mg tablet    amLODIPine (NORVASC) 5 mg tablet    aspirin 81 MG tablet    atorvastatin (LIPITOR) 80 mg tablet    Cholecalciferol (VITAMIN D3) 1000 units CHEW    esomeprazole (NEXIUM) 20 mg capsule    ferrous sulfate 325 (65 Fe) mg tablet    fluticasone (FLONASE) 50 mcg/act nasal spray    furosemide (LASIX) 40 mg tablet    glucose blood test strip    Insulin Syringe-Needle U-100 (BD INSULIN SYRINGE ULTRAFINE) 31G X 5/16" 1 ML MISC    LANTUS 100 UNIT/ML subcutaneous injection    levETIRAcetam (KEPPRA) 750 mg tablet    levothyroxine 100 mcg tablet    lisinopril (ZESTRIL) 5 mg tablet    loratadine (CLARITIN) 10 mg tablet    magnesium oxide (MAG-OX) 400 mg    metFORMIN (GLUCOPHAGE) 1000 MG tablet    metoclopramide (REGLAN) 10 mg tablet    Mometasone Furoate (ASMANEX HFA) 100 MCG/ACT AERO    pantoprazole (PROTONIX) 40 mg tablet    potassium chloride (K-DUR,KLOR-CON) 10 mEq tablet    rivaroxaban (XARELTO) 20 mg tablet    acetaminophen (TYLENOL) 650 mg CR tablet    albuterol (VENTOLIN HFA) 90 mcg/act inhaler    calcium carbonate (TUMS) 500 mg chewable tablet    cholecalciferol (VITAMIN D3) 1,000 units tablet    diphenhydrAMINE (BENADRYL) 50 mg capsule    methylPREDNISolone (MEDROL) 32 MG tablet    nitroglycerin (NITROSTAT) 0 4 mg SL tablet    phenazopyridine (PYRIDIUM) 200 mg tablet    polyethylene glycol (MIRALAX) 17 g packet     Hospital Medications  Current Facility-Administered Medications   Medication Dose Route Frequency    acetaminophen (TYLENOL) tablet 650 mg  650 mg Oral Q6H PRN    albuterol (PROVENTIL HFA,VENTOLIN HFA) inhaler 1 puff  1 puff Inhalation BID PRN    amiodarone tablet 200 mg  200 mg Oral Daily With Breakfast    amLODIPine (NORVASC) tablet 10 mg  10 mg Oral Daily    aspirin chewable tablet 81 mg  81 mg Oral Daily    atorvastatin (LIPITOR) tablet 80 mg  80 mg Oral Daily    calcium carbonate (TUMS) chewable tablet 1,000 mg  1,000 mg Oral TID    diphenhydrAMINE (BENADRYL) tablet 50 mg  50 mg Oral 60 Min Pre-Op    docusate sodium (COLACE) capsule 100 mg  100 mg Oral BID    ferrous sulfate tablet 325 mg  325 mg Oral Every Other Day    fluticasone (FLONASE) 50 mcg/act nasal spray 1 spray  1 spray Nasal Daily    fluticasone (FLOVENT HFA) 110 MCG/ACT inhaler 2 puff  2 puff Inhalation Q12H [address]    HYDROcodone-acetaminophen (NORCO) 5-325 mg per tablet 2 tablet  2 tablet Oral Q4H    HYDROmorphone (DILAUDID) injection 0 5 mg  0 5 mg Intravenous Q6H PRN    insulin glargine (LANTUS) subcutaneous injection 22 Units 0 22 mL  22 Units Subcutaneous Q12H [address]    insulin lispro (HumaLOG) 100 units/mL subcutaneous injection 2-12 Units  2-12 Units Subcutaneous 4x Daily (AC & HS)    levETIRAcetam (KEPPRA) tablet 750 mg  750 mg Oral Q12H [address]    levothyroxine tablet 100 mcg  100 mcg Oral Early Morning    lisinopril (ZESTRIL) tablet 5 mg  5 mg Oral Daily    loratadine (CLARITIN) tablet 10 mg  10 mg Oral Daily    magnesium oxide (MAG-OX) tablet 400 mg  400 mg Oral BID    metoclopramide (REGLAN) tablet 5 mg  5 mg Oral Q6H PRN    metoprolol tartrate (LOPRESSOR) partial tablet 12 5 mg  12 5 mg Oral Q12H LANCE    nitroglycerin (NITROSTAT) SL tablet 0 4 mg  0 4 mg Sublingual Q3 min PRN    pantoprazole (PROTONIX) EC tablet 20 mg  20 mg Oral Early Morning    polyethylene glycol (MIRALAX) packet 17 g  17 g Oral BID    rivaroxaban (XARELTO) tablet 15 mg  15 mg Oral Daily       Allergies   Allergen Reactions    Other Chest Pain     IVP-listed as "chest pain" in previous chart, patient stated this occurred "a long time ago" but does not remember exactly occurred     Cephalosporins Chest Pain    Contrast [Iodinated Diagnostic Agents] Other (See Comments)     Flash pulm edema    Doxycycline Chest Pain    Ketorolac Other (See Comments)     Chest pain     Levaquin [Levofloxacin] Chest Pain    Ondansetron Chest Pain Prolonged QT    Toradol [Ketorolac Tromethamine] Chest Pain       Objective   Temp:  [97 7 °F (36 5 °C)-98 5 °F (36 9 °C)] 97 7 °F (36 5 °C)  HR:  [56-58] 58  Resp:  [18] 18  BP: (144-164)/(64-72) 144/64    Intake/Output Summary (Last 24 hours) at 08/17/18 1519  Last data filed at 08/17/18 1343   Gross per 24 hour   Intake              540 ml   Output              400 ml   Net              140 ml     Physical Exam:  General Appearance:    Alert, cooperative, no distress   Neurological:   Oriented to person, place, and time   Head:    Normocephalic, without obvious abnormality, atraumatic   Eyes:    EOM's intact   Back:     Symmetric   Lungs:     Clear to auscultation bilaterally, respirations unlabored   Chest Wall:    No tenderness or deformity   Abdomen:        Soft, no distention or tenderness, bowel sounds present    Heart:    HR rodrigue   Extremities:   Extremities mild LLE edema; intact sensation to light touch   Skin:   Skin color warm and dry, left foot erythema with edema     Lab Results:   Results from last 7 days  Lab Units 08/16/18  0607   WBC Thousand/uL 12 12*   HEMOGLOBIN g/dL 9 8*   HEMATOCRIT % 32 0*   PLATELETS Thousands/uL 244      Results from last 7 days  Lab Units 08/16/18  0607   SODIUM mmol/L 139   POTASSIUM mmol/L 4 2   CHLORIDE mmol/L 105   CO2 mmol/L 29   BUN mg/dL 17   CREATININE mg/dL 0 76   CALCIUM mg/dL 8 5   GLUCOSE RANDOM mg/dL 66       Imaging Studies: I have personally reviewed pertinent reports  Counseling / Coordination of Care  Total floor / unit time spent today 45 minutes  Greater than 50% of total time was spent with the patient and / or family counseling and / or coordination of care   A description of the counseling / coordination of care: Reviewed plan of care and medications with patient, RN staff and primary care team     Pepe Evangelista MS, RN-BC  Acute Pain

## 2018-08-17 NOTE — CASE MANAGEMENT
Continued Stay Review    Date: 08/17/2018    Vital Signs: /64 (BP Location: Left arm)   Pulse 58   Temp 97 7 °F (36 5 °C) (Oral)   Resp 18   Ht 5' 4" (1 626 m)   Wt 73 1 kg (161 lb 1 6 oz)   LMP  (LMP Unknown)   SpO2 96%   BMI 27 65 kg/m²     Medications:   Scheduled Meds:   Current Facility-Administered Medications:  acetaminophen 650 mg Oral Q6H PRN Brinda Stairs, DO   albuterol 1 puff Inhalation BID PRN Brinda Stairs, DO   amiodarone 200 mg Oral Daily With Breakfast Brinda Stairs, DO   amLODIPine 10 mg Oral Daily Musa Golcee, DO   aspirin 81 mg Oral Daily Brinda Stairs, DO   atorvastatin 80 mg Oral Daily Brinda Stairs, DO   calcium carbonate 1,000 mg Oral TID Brinda Stairs, DO   diphenhydrAMINE 50 mg Oral 60 Min Pre-Op Nasreen Montenegro PA-C   docusate sodium 100 mg Oral BID Seferino Goltz, DO   ferrous sulfate 325 mg Oral Every Other Day Jose Alberto Mins, DO   fluticasone 1 spray Nasal Daily Brinda Stairs, DO   fluticasone 2 puff Inhalation Q12H [address] Brinda Stairs, DO   HYDROcodone-acetaminophen 2 tablet Oral Q4H Max Jean-Baptiste, DO   insulin glargine 22 Units Subcutaneous Q12H [address], DO   insulin lispro 2-12 Units Subcutaneous 4x Daily (AC & HS) Jose Alberto Mins, DO   levETIRAcetam 750 mg Oral Q12H [address] Brinda Stairs, DO   levothyroxine 100 mcg Oral Early Morning Brinda Stairs, DO   lisinopril 5 mg Oral Daily Max Estiven, DO   loratadine 10 mg Oral Daily Brinda Stairs, DO   magnesium oxide 400 mg Oral BID Brinda Stairs, DO   metoclopramide 5 mg Oral Q6H PRN Brinda Stairs, DO   metoprolol tartrate 12 5 mg Oral Q12H [address] Brinda Stairs, DO   nitroglycerin 0 4 mg Sublingual Q3 min PRN Brinda Stairs, DO   pantoprazole 20 mg Oral Early Morning Brinda Stairs, DO   polyethylene glycol 17 g Oral BID Musaino Goltz, DO   rivaroxaban 15 mg Oral Daily Jose Alberto Mins, DO       Abnormal Labs/Diagnostic Results:     Age/Sex: [de-identified] y o  female     Assessment/Plan:  5961, 5132  Principal Problem: Cellulitis  Active Problems:    Essential hypertension    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 native heart without angina pectoris    Type 2 diabetes mellitus with complication, with long-term current use of insulin ([facility])    Atrial flutter ([facility])    Hyperlipidemia    Gastroesophageal reflux disease without esophagitis    Chronic combined systolic and diastolic congestive heart failure ([facility])    Paroxysmal atrial fibrillation ([facility])    Ulcer of toe of left foot ([facility])    Ulcer of toe of right foot ([facility] [address] )    History of CVA (cerebrovascular accident)    Seizure ([facility] [address] )    Anemia    PAD (peripheral artery disease) ([facility])    CKD (chronic kidney disease) stage 3, GFR 30-59 ml/min     1  B/L Toe Wounds with 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 2/2 to severe PAD s/p IR RLE angioplasties of R distal SFA, mid R peroneal artery  - podiatry following and plan for either amputation vs BKA on the R pending LEADs  - ABIs pending, machinery reportedly broken at this time  - vascular is following and awaiting ABIs  - Off antibiotics per ID as this is more of chronic circulatory issue and has completed course of antibiotics  - will continue supportive care over the weekend and hopefully machinery will be repaired by Monday and patient can have study done to determine definitve treatment options  - continue pain control with norco 10 and IV dilaudid for breakthrough     2  Combined Diastolic & Systolic Heart Failure   -ROBB on 0/3/6445 showed systolic function was moderately to markedly reduced  Ejection fraction was estimated to be 35 %   -appears euvolemic on exam  -Continue Furosemide 40 mg daily   -Continue Lisinopril 5 mg     3  CAD   -ASA 81 mg   -Atorvastatin 80 mg  -Metoprolol 12 5 mg     4  Paroxysmal Atrial Fibrillation  -NSR  -rates well controlled  -Amiodarone 200 mg  -Xarelto 20 mg daily      5   Type 2 DM  -Metformin and Lantus at home  -Hold Metformin  -increase to Lantus 22 units q12 hours  -Humalog with meals and daily at bedtime      6  GERD  -Pantoprazole 20 mg      7  Microcytic Anemia  -Hgb stable   -Ferrous sulfate every other day      8  Asthma  -Albuterol prn  -Fluticasone 2 puffs q12 hours      9  Seizure Disorder  -Keppra 750 mg q12 hours      10  Hypothyroidism  Levothyroxine 1000 mcg      11   HTN -   - increase amlodipine to 10mg daily  - restart on home lisinopril     Disposition: Continue inpatient care  Discharge Plan:       ----------------------------------------------------------------------------------------------------------------------------------    08/17/2018  Vascular Surgery  Assessment:  [de-identified] y/o F w/ PAD, dry gangrene on b/l 1st toes, L 2nd toe, R 3rd toe, who p/w L foot edema and erythema   --8/10: IR RLE angiogram--angioplasties of R distal SFA, mid R peroneal artery      Plan:  ABIs pending- machine broken- may have to obtain outpatient  Further recs would depend on ABIs  Planned for D/C today by primary team  Continue Xarelto

## 2018-08-17 NOTE — SOCIAL WORK
S/w wen, Karissa Cantu to discuss rehab choices for possible dc today  Karissa Cantu requested to s/e doctor re: medical plan  S/w Max from [facility] whom s/w son & updated cm stating pt not medically cleared yet  Will be here over the weekend

## 2018-08-17 NOTE — CONSULTS
Consultation - Cardiology Team One  Sera Yun [de-identified] y o  female MRN: [id number]  Unit/Bed#: -01 Encounter: [id number]    Inpatient consult to Cardiology  Consult performed by: Efe Keenan  Consult ordered by: Soha Ta            Attending:    I saw the patient and agree with above  Consult for preop cardiac clearance for Right pop-AT bypass  She underwent TAVR in April of this year  She has had CABG and has a patent LIMA to LAD, patent SVG to D1 sequential and occluded grafts to PDA and OM  LVEF has been 35% with severe pulmonary HTN  She has pulmonary edema on exam today so will diurese over the weekend  She's clearly high risk for surgery but not prohibitive  Preop eval requested for Right BKA  Physician Requesting Consult: Kenny White MD  Reason for Consult / Principal Problem: pre op risk assessment      Assessment/ Plan  Severe PVD  Pre op cardiac evaluation- either amputation vs  Right BKA, pending vascular surgery workup  Chronic combined HF- EF 35% echo 4/18  · On lasix 40 mg po/d and lisinopril 5 mg/d  Aortic stenosis-S/P TAVR 4/3/18 with a 26mm Bioprostethic valve  Mitral stenosis, mild-moderate- medically treated  A fib, persistent  On [address] with xarelto  Rhythm controlled with metoprolol and amiodarone 200 mg/d  CAD, S/p CABG-recent cardiac cath: Patent LIMA-LAD, patent SVG D1-2-3/sequential(mild degeneration)  occluded(OLD) SVG om1 and SVG PDA  · on ASA 81, atorva 80, BB  HTN with moderate LVH  AVG /68metoprolol tartrate 12 5 mg q12hr  chronic LBBB        History of Present Illness   HPI: Sera Yun is a [de-identified]y o  year old female who has a history of CAD (prior CABG),CM (EF 30%), severe AS, moderate mitral stenosis, chronic combined HF, persistent a fib on [address] with xarelto), diabetes, PAD, and CKD3 follows with cardiologist Dr Louise Wilkes  She presents with dry gangrene of multiple toes with erythema and edema, followed by podiatry and vascular surgery   She underwent angioplasties of the right distal SFA and mid right peroneal arteries 8/10  ABIs are pending  She may require a right BKA or amputation for which cardiology is asked to provide a preoperative evaluation  EKG reviewed personally: 8/9/18  SB 59bpm  prolonged 1st degree AV block ( 322 ms)  LBBB  Cardiac cath 3/19/18  IMPRESSIONS:  There is significant triple vessel coronary artery disease  Patent LIMA-LAD, patent SVG D1-2-3/sequential(mild degeneration)  occluded(OLD) SVG om1 and SVG PDA     RECOMMENDATIONS  The patient should continue with the present medications  Consultation be obtained for aortic valve replacement      Prepared and signed by  Letty Jalloh MD  Signed 03/19/2018 14:06:04      Review of Systems   Constitution: Positive for weakness and malaise/fatigue  HENT: Negative  Eyes: Negative  Cardiovascular: Positive for dyspnea on exertion  Negative for chest pain  Respiratory: Positive for shortness of breath  Endocrine: Negative  Hematologic/Lymphatic: Negative  Skin: Negative  Musculoskeletal: Positive for joint pain  Gastrointestinal: Negative  Genitourinary: Negative  Neurological: Negative for dizziness  Psychiatric/Behavioral: Negative  Allergic/Immunologic: Negative        Historical Information   Past Medical History:   Diagnosis Date    Altered mental status     Anemia     Anticoagulated     Xarelto for Afib/ flutter    Asthma     Atrial flutter (HCC)     maintained on anticoag w/ Xarelto    Bradycardia     CAD (coronary artery disease)     Candidiasis     Carotid stenosis, bilateral     Cataract     Chest pain     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) (HCC)     Chronic fatigue syndrome     Chronic low back pain     Chronic ulcer of toes (HCC)     left and right    Concussion w loss of consciousness of unsp duration, init     CVA [facility]cerebral vascular accident) [facility] ) 4/17/2018    Diabetes mellitus ([facility] )     type 2, insulin dependent    DJD (degenerative joint disease)     Expressive aphasia     Foot pain     GERD (gastroesophageal reflux disease)     Herpes zoster     HLD (hyperlipidemia)     HTN (hypertension)     Hypothyroidism     Irritable bowel syndrome     Kidney stone     Lumbar radiculopathy     Lyme disease     Dx in hospital 8/2011    [facility]     Migraines     Muscle spasm     Non-neoplastic nevus     Nontoxic multinodular goiter     NSVT (nonsustained ventricular tachycardia) (HCC)     Osteoarthritis     Other chronic pain     PAD (peripheral artery disease) (HCC)     Palpitations     Paroxysmal atrial fibrillation (HCC)     Pseudogout     Pulmonary hypertension (HCC)     S/P TAVR (transcatheter aortic valve replacement)     Seizures (HCC)     Severe sepsis ([facility] )     Spinal stenosis     Stroke ([place] [address] ) 05/2018    Transient cerebral ischemia     Trigger ring finger     Viral gastroenteritis      Past Surgical History:   Procedure Laterality Date    APPENDECTOMY      ARTERIOGRAM  12/19/2017    CARDIAC CATHETERIZATION      CHOLECYSTECTOMY      COLONOSCOPY      CORONARY ARTERY BYPASS GRAFT  2004    IR ABDOMINAL ANGIOGRAPHY / INTERVENTION  8/10/2018    LUMBAR LAMINECTOMY      OK ECHO TRANSESOPHAG R-T 2D W/PRB IMG ACQUISJ I&R N/A 4/3/2018    Procedure: TRANSESOPHAGEAL ECHOCARDIOGRAM (ROBB);   Surgeon: Vijaya Baez DO;  [facility];  Service: Cardiac Surgery    OK REPLACE AORTIC VALVE OPENFEMORAL ARTERY APPROACH N/A 4/3/2018    Procedure: REPLACEMENT AORTIC VALVE TRANSCATHETER (TAVR) TRANSFEMORAL w/ 26MM IVY YEVGENIY S3 VALVE;  Surgeon: Vijaya Baez DO;  [facility];  Service: Cardiac Surgery    THYROIDECTOMY      TOTAL ABDOMINAL HYSTERECTOMY W/ BILATERAL SALPINGOOPHORECTOMY       History   Alcohol Use No     History   Drug Use No     History   Smoking Status    Never Smoker   Smokeless Tobacco    Never Used Family History:   Family History   Problem Relation Age of Onset    Cancer Mother     Stroke Mother     Cancer Father     Heart disease Father     Breast cancer Sister     Diabetes Daughter         Passed away January 2017        Meds/Allergies   all current active meds have been reviewed, current meds:   Current Facility-Administered Medications   Medication Dose Route Frequency    acetaminophen (TYLENOL) tablet 650 mg  650 mg Oral Q6H PRN    albuterol (PROVENTIL HFA,VENTOLIN HFA) inhaler 1 puff  1 puff Inhalation BID PRN    amiodarone tablet 200 mg  200 mg Oral Daily With Breakfast    amLODIPine (NORVASC) tablet 10 mg  10 mg Oral Daily    aspirin chewable tablet 81 mg  81 mg Oral Daily    atorvastatin (LIPITOR) tablet 80 mg  80 mg Oral Daily    calcium carbonate (TUMS) chewable tablet 1,000 mg  1,000 mg Oral TID    diphenhydrAMINE (BENADRYL) tablet 50 mg  50 mg Oral 60 Min Pre-Op    docusate sodium (COLACE) capsule 100 mg  100 mg Oral BID    ferrous sulfate tablet 325 mg  325 mg Oral Every Other Day    fluticasone (FLONASE) 50 mcg/act nasal spray 1 spray  1 spray Nasal Daily    fluticasone (FLOVENT HFA) 110 MCG/ACT inhaler 2 puff  2 puff Inhalation Q12H LANCE    HYDROcodone-acetaminophen (NORCO) 5-325 mg per tablet 2 tablet  2 tablet Oral Q4H    HYDROmorphone (DILAUDID) injection 0 5 mg  0 5 mg Intravenous Q6H PRN    insulin glargine (LANTUS) subcutaneous injection 22 Units 0 22 mL  22 Units Subcutaneous Q12H [facility]    insulin lispro (HumaLOG) 100 units/mL subcutaneous injection 2-12 Units  2-12 Units Subcutaneous 4x Daily (AC & HS)    levETIRAcetam (KEPPRA) tablet 750 mg  750 mg Oral Q12H [facility]    levothyroxine tablet 100 mcg  100 mcg Oral Early Morning    lisinopril (ZESTRIL) tablet 5 mg  5 mg Oral Daily    loratadine (CLARITIN) tablet 10 mg  10 mg Oral Daily    magnesium oxide (MAG-OX) tablet 400 mg  400 mg Oral BID    metoclopramide (REGLAN) tablet 5 mg  5 mg Oral Q6H PRN    metoprolol tartrate (LOPRESSOR) partial tablet 12 5 mg  12 5 mg Oral Q12H [address]    nitroglycerin (NITROSTAT) SL tablet 0 4 mg  0 4 mg Sublingual Q3 min PRN    pantoprazole (PROTONIX) EC tablet 20 mg  20 mg Oral Early Morning    polyethylene glycol (MIRALAX) packet 17 g  17 g Oral BID    rivaroxaban (XARELTO) tablet 15 mg  15 mg Oral Daily    and PTA meds:   Prior to Admission Medications   Prescriptions Last Dose Informant Patient Reported? Taking? Cholecalciferol (VITAMIN D3) 1000 units CHEW 8/8/2018 at Unknown time  No Yes   Sig: Chew 1 tablet (1,000 Units total) 2 (two) times a day   Insulin Syringe-Needle U-100 (BD INSULIN SYRINGE ULTRAFINE) 31G X 5/16" 1 ML MISC 8/8/2018 at Unknown time Family Member No Yes   Sig: Inject under the skin 2 (two) times a day   LANTUS 100 UNIT/ML subcutaneous injection 8/8/2018 at Unknown time Family Member No Yes   Sig: Inject 20 Units under the skin 2 (two) times a day   Mometasone Furoate [facility] HFA) 100 MCG/ACT AERO 8/8/2018 at Unknown time Family Member No Yes   Sig: Inhale 2 puffs once daily   acetaminophen (TYLENOL) 650 mg CR tablet Unknown at Unknown time Family Member No No   Sig: Take 1 tablet (650 mg total) by mouth every 8 (eight) hours as needed for mild pain Do not take in conjunction with Percocet     albuterol (VENTOLIN HFA) 90 mcg/act inhaler Unknown at Unknown time Family Member Yes No   Sig: Inhale 108 mcg 2 (two) times a day as needed 2 puffs bid PRN   amLODIPine (NORVASC) 5 mg tablet 8/8/2018 at Unknown time Family Member No Yes   Sig: Take 1 tablet (5 mg total) by mouth daily   amiodarone 200 mg tablet 8/8/2018 at Unknown time Family Member No Yes   Sig: Take 1 tablet (200 mg total) by mouth 2 (two) times a day   aspirin 81 MG tablet 8/8/2018 at Unknown time Family Member Yes Yes   Sig: Take 81 mg by mouth daily   atorvastatin (LIPITOR) 80 mg tablet 8/7/2018 at Unknown time Family Member No Yes   Sig: Take 1 tablet (80 mg total) by mouth daily   calcium carbonate (TUMS) 500 mg chewable tablet Unknown at Unknown time Family Member Yes No   Sig: Chew 2 tablets 3 (three) times a day     cholecalciferol (VITAMIN D3) 1,000 units tablet Unknown Family Member Yes No   Sig: Take 1,000 Units by mouth 2 (two) times a day     diphenhydrAMINE (BENADRYL) 50 mg capsule Unknown at Unknown time  No No   Sig: Take 1 capsule (50 mg total) by mouth once for 1 dose Take 1 hour prior to angiogram procedure for contrast allergy   esomeprazole (NEXIUM) 20 mg capsule 2018 at Unknown time Family Member Yes Yes   Sig: Take 20 mg by mouth every morning before breakfast   ferrous sulfate 325 (65 Fe) mg tablet 2018 at Unknown time Family Member No Yes   Sig: Take 1 tablet (325 mg total) by mouth daily with breakfast   fluticasone (FLONASE) 50 mcg/act nasal spray 2018 at Unknown time Family Member Yes Yes   Si spray into each nostril daily   furosemide (LASIX) 40 mg tablet 2018 at Unknown time Family Member No Yes   Sig: Take 1 tablet (40 mg total) by mouth daily   glucose blood test strip 2018 at Unknown time Family Member Yes Yes   Si each by Other route 4 (four) times a day (before meals and at bedtime) Use as instructed   levETIRAcetam (KEPPRA) 750 mg tablet 2018 at Unknown time Family Member No Yes   Sig: Take 1 tablet (750 mg total) by mouth every 12 (twelve) hours   levothyroxine 100 mcg tablet 2018 at Unknown time Family Member No Yes   Sig: Take 1 tablet (100 mcg total) by mouth daily   lisinopril (ZESTRIL) 5 mg tablet 2018 at Unknown time Family Member No Yes   Sig: Take 1 tablet (5 mg total) by mouth daily   loratadine (CLARITIN) 10 mg tablet 2018 at Unknown time Family Member No Yes   Sig: Take 1 tablet (10 mg total) by mouth daily   magnesium oxide (MAG-OX) 400 mg 2018 at Unknown time Family Member No Yes   Sig: Take 1 tablet (400 mg total) by mouth 2 (two) times a day   metFORMIN (GLUCOPHAGE) 1000 MG tablet 2018 at Unknown time Family Member Yes Yes   Si,000 mg 2 (two) times a day   methylPREDNISolone (MEDROL) 32 MG tablet Unknown at Unknown time Family Member No No   Sig: Take 1 tablet (32 mg total) by mouth daily   metoclopramide (REGLAN) 10 mg tablet 2018 at Unknown time Family Member No Yes   Sig: Take 0 5 tablets (5 mg total) by mouth every 6 (six) hours as needed (Nausea and vomiting)   metoprolol tartrate (LOPRESSOR) 25 mg tablet   No No   Sig: Take 0 5 tablets (12 5 mg total) by mouth every 12 (twelve) hours   nitroglycerin (NITROSTAT) 0 4 mg SL tablet Unknown at Unknown time Family Member Yes No   Sig: Place 0 4 mg under the tongue every 3 (three) minutes as needed   pantoprazole (PROTONIX) 40 mg tablet 2018 at Unknown time Family Member Yes Yes   Sig: Take 40 mg by mouth daily   phenazopyridine (PYRIDIUM) 200 mg tablet Unknown at Unknown time Family Member No No   Sig: Take 1 tablet (200 mg total) by mouth 3 (three) times a day with meals   polyethylene glycol (MIRALAX) 17 g packet Unknown at Unknown time Family Member Yes No   Sig: Take 17 g by mouth daily as needed     potassium chloride (K-DUR,KLOR-CON) 10 mEq tablet 2018 at Unknown time Family Member No Yes   Sig: Take 1 tablet (10 mEq total) by mouth 2 (two) times a day   rivaroxaban (XARELTO) 20 mg tablet Past Week at Unknown time Family Member No Yes   Sig: Take 1 tablet (20 mg total) by mouth daily      Facility-Administered Medications: None          Allergies   Allergen Reactions    Other Chest Pain     IVP-listed as "chest pain" in previous chart, patient stated this occurred "a long time ago" but does not remember exactly occurred     Cephalosporins Chest Pain    Contrast [Iodinated Diagnostic Agents] Other (See Comments)     Flash pulm edema    Doxycycline Chest Pain    Ketorolac Other (See Comments)     Chest pain     Levaquin [Levofloxacin] Chest Pain    Ondansetron Chest Pain     Prolonged QT    Toradol [Ketorolac Tromethamine] Chest Pain Objective   Vitals: Blood pressure 144/64, pulse 58, temperature 97 7 °F (36 5 °C), temperature source Oral, resp  rate 18, height 5' 4" (1 626 m), weight 73 1 kg (161 lb 1 6 oz), SpO2 96 %, not currently breastfeeding ,     Body mass index is 27 65 kg/m²  ,     Systolic (94OYI), DPY:452 , Min:144 , NFW:466     Diastolic (94ZVZ), CHAWLA:45, Min:64, Max:72            Intake/Output Summary (Last 24 hours) at 08/17/18 1406  Last data filed at 08/17/18 1343   Gross per 24 hour   Intake              540 ml   Output              400 ml   Net              140 ml     Weight (last 2 days)     None        Invasive Devices     Peripheral Intravenous Line            Peripheral IV 08/14/18 Right Antecubital 2 days                  Physical Exam   Constitutional: She is oriented to person, place, and time  No distress  HENT:   Mouth/Throat: No oropharyngeal exudate  Eyes: No scleral icterus  Neck: JVD present  Cardiovascular: Normal rate and regular rhythm  Murmur heard  Pulmonary/Chest: Effort normal  She has no wheezes  She has rales  Abdominal: Soft  Bowel sounds are normal  She exhibits no distension  There is no tenderness  Musculoskeletal: She exhibits edema  Neurological: She is alert and oriented to person, place, and time  Skin: Skin is warm and dry  She is not diaphoretic  Psychiatric: She has a normal mood and affect   Her behavior is normal          LABORATORY RESULTS:      CBC with diff:   Results from last 7 days  Lab Units 08/16/18  0607 08/15/18  0540 08/14/18  0530 08/13/18  0531 08/12/18  0541   WBC Thousand/uL 12 12* 10 38* 10 27* 10 12 11 02*   HEMOGLOBIN g/dL 9 8* 9 3* 10 1* 10 0* 9 8*   HEMATOCRIT % 32 0* 31 1* 32 5* 32 9* 32 1*   MCV fL 78* 78* 78* 77* 78*   PLATELETS Thousands/uL 244 220 232 239 235   MCH pg 24 0* 23 4* 24 1* 23 4* 23 7*   MCHC g/dL 30 6* 29 9* 31 1* 30 4* 30 5*   RDW % 18 0* 17 9* 17 9* 18 0* 18 0*   MPV fL 10 1 10 5 10 4 10 8 10 5   NRBC AUTO /100 WBCs 0  --  0 0 0 CMP:  Results from last 7 days  Lab Units 18  0607 08/15/18  0540 18  0531 18  0541   SODIUM mmol/L 139 138 137 139   POTASSIUM mmol/L 4 2 4 0 3 7 4 1   CHLORIDE mmol/L 105 105 104 106   CO2 mmol/L 29  27 28   ANION GAP mmol/L 5 6 6 5   BUN mg/dL 17 12 12 14   CREATININE mg/dL 0 76 0 67 0 68 0 73   GLUCOSE RANDOM mg/dL 66 75 81 82   CALCIUM mg/dL 8 5 8 1* 8 1* 8 0*   EGFR ml/min/1 73sq m 74 83 83 78       BMP:  Results from last 7 days  Lab Units 18  0607 08/15/18  0540 18  0531 18  0541   SODIUM mmol/L 139 138 137 139   POTASSIUM mmol/L 4 2 4 0 3 7 4 1   CHLORIDE mmol/L 105 105 104 106   CO2 mmol/L 29 27 27 28   BUN mg/dL 17 12 12 14   CREATININE mg/dL 0 76 0 67 0 68 0 73   GLUCOSE RANDOM mg/dL 66 75 81 82   CALCIUM mg/dL 8 5 8 1* 8 1* 8 0*          Lab Results   Component Value Date    NTBNP 12,681 (H) 03/15/2018                    Lipid Profile:   Lab Results   Component Value Date    CHOL 187 10/14/2015    CHOL 177 02/10/2015    CHOL 168 2014     Lab Results   Component Value Date    HDL 35 (L) 2018    HDL 42 2017    HDL 52 2016     Lab Results   Component Value Date    LDLCALC 46 2018    LDLCALC 63 2017    LDLCALC 50 2016     Lab Results   Component Value Date    TRIG 99 2018    TRIG 210 (H) 2017    TRIG 189 (H) 2016         Cardiac testing:   Results for orders placed during the hospital encounter of 18   Echo complete with contrast if indicated    Narrative [address]  [phone]    Transthoracic Echocardiogram  2D, M-mode, Doppler, and Color Doppler    Study date:  01-May-2018    Patient: Shabbir Siddiqi  MR number: [id number]  Account number: [de-identified]  : 1940  Age: [de-identified] years  Gender: Female  Status: Inpatient  Location: Bedside  Height: 64 in  Weight: 161 7 lb  BP: 171/ 83 mmHg    Indications:  Aortic Valve Disorder Evaluate prosthetic aortic valve  Evaluate prosthetic mitral valve  Diagnoses: I35 9 - Nonrheumatic aortic valve disorder, unspecified    Sonographer:  Reg Jasmine RDCS  Primary Physician:  Morena Daily MD  Group:  [facility]  Interpreting Physician:  Beatris Sommers MD    SUMMARY    LEFT VENTRICLE:  The ventricle was mildly dilated  Systolic function was moderately to markedly reduced  Ejection fraction was estimated to be 35 %  There was moderate diffuse hypokinesis with distinct regional wall motion abnormalities  More severe hypokinesis to akinesis was seen in the inferoseptal and inferior walls  Wall thickness was mildly increased  VENTRICULAR SEPTUM:  There was moderate dyssynergic motion  These changes are consistent with LBBB  RIGHT VENTRICLE:  The ventricle was moderately dilated  Systolic function was mildly reduced  LEFT ATRIUM:  The atrium was moderately dilated  RIGHT ATRIUM:  The atrium was moderately dilated  MITRAL VALVE:  There was marked annular calcification  There was moderate diffuse thickening  There was moderately reduced leaflet separation  Transmitral velocity was increased due to valvular stenosis  There was moderate stenosis  There was mild regurgitation  AORTIC VALVE:  A bioprosthesis was present  It exhibited normal function  There was no significant perivalvular regurgitation  Valve mean gradient was 8 mmHg  TRICUSPID VALVE:  There was moderate regurgitation  Pulmonary artery systolic pressure was markedly increased  Estimated peak PA pressure was 70 mmHg  IVC, HEPATIC VEINS:  The inferior vena cava was dilated  HISTORY: PRIOR HISTORY: TAVR, Mitral Stenosis, CABG, MI, HTN, Asthma, CHF, DM, CVA, CAD, HLD    PROCEDURE: The procedure was performed at the bedside  This was a routine study  The transthoracic approach was used  The study included complete 2D imaging, M-mode, complete spectral Doppler, and color Doppler   The heart rate was 100 bpm,  at the start of the study  Images were obtained from the parasternal, apical, subcostal, and suprasternal notch acoustic windows  Echocardiographic views were limited due to restricted patient mobility, poor patient compliance, and lung  interference  This was a technically difficult study  LEFT VENTRICLE: The ventricle was mildly dilated  Systolic function was moderately to markedly reduced  Ejection fraction was estimated to be 35 %  There was moderate diffuse hypokinesis with distinct regional wall motion abnormalities  More severe hypokinesis to akinesis was seen in the inferoseptal and inferior walls  Wall thickness was mildly increased  DOPPLER: The study was not technically sufficient to allow evaluation of LV diastolic function  VENTRICULAR SEPTUM: Thickness was mildly increased  There was moderate dyssynergic motion  These changes are consistent with LBBB  RIGHT VENTRICLE: The ventricle was moderately dilated  Systolic function was mildly reduced  Wall thickness was normal     LEFT ATRIUM: The atrium was moderately dilated  RIGHT ATRIUM: The atrium was moderately dilated  MITRAL VALVE: There was marked annular calcification  There was moderate diffuse thickening  There was moderately reduced leaflet separation  DOPPLER: Transmitral velocity was increased due to valvular stenosis  There was moderate  stenosis  There was mild regurgitation  AORTIC VALVE: A bioprosthesis was present  It exhibited normal function  DOPPLER: Transaortic velocity was within the normal range  There was no evidence for stenosis  There was no significant regurgitation  There was no significant  perivalvular regurgitation  TRICUSPID VALVE: The valve structure was normal  DOPPLER: The transtricuspid velocity was within the normal range  There was moderate regurgitation  Pulmonary artery systolic pressure was markedly increased  Estimated peak PA pressure was  70 mmHg      PULMONIC VALVE: Not well visualized  DOPPLER: The transpulmonic velocity was within the normal range  There was no regurgitation  PERICARDIUM: There was no pericardial effusion  AORTA: The root exhibited normal size  The ascending aorta was normal in size  SYSTEMIC VEINS: IVC: The inferior vena cava was dilated  PULMONARY VEINS: DOPPLER: Doppler signals were not recordable in the pulmonary vein(s)  MEASUREMENT TABLES    DOPPLER MEASUREMENTS  Aortic valve   (Reference normals)  Mean gradient   8 mmHg   (--)    SYSTEM MEASUREMENT TABLES    2D  %FS: 15 07 %  Ao Diam: 2 75 cm  EDV(Teich): 139 25 ml  EF(Teich): 31 62 %  ESV(Teich): 95 22 ml  IVSd: 1 2 cm  LA Area: 23 57 cm2  LA Diam: 4 16 cm  LVEDV MOD A4C: 93 35 ml  LVEF MOD A4C: 41 28 %  LVESV MOD A4C: 54 81 ml  LVIDd: 5 37 cm  LVIDs: 4 56 cm  LVLd A4C: 7 64 cm  LVLs A4C: 6 64 cm  LVOT Diam: 1 9 cm  LVPWd: 1 21 cm  RA Area: 25 62 cm2  RVIDd: 5 25 cm  SV MOD A4C: 38 53 ml  SV(Teich): 44 03 ml    CW  AV Env  Ti: 318 34 ms  AV VTI: 47 91 cm  AV Vmax: 2 36 m/s  AV Vmean: 1 5 m/s  AV maxP 29 mmHg  AV meanPG: 10 48 mmHg  TR Vmax: 3 84 m/s  TR maxP 13 mmHg    MM  TAPSE: 1 4 cm    PW  CHER (VTI): 1 05 cm2  CHER Vmax: 1 03 cm2  E': 0 03 m/s  E/E': 81 41  HR: 173 4 BPM  LVOT Env  Ti: 332 18 ms  LVOT VTI: 17 78 cm  LVOT Vmax: 0 86 m/s  LVOT Vmean: 0 54 m/s  LVOT maxP 95 mmHg  LVOT meanP 37 mmHg  LVSI Dopp: 28 05 ml/m2  LVSV Dopp: 50 2 ml  MV A Jayant: 2 32 m/s  MV Dec Payne: 11 07 m/s2  MV DecT: 201 33 ms  MV E Jayant: 2 23 m/s  MV E/A Ratio: 0 96  MV PHT: 58 39 ms  MV VTI: 50 85 cm  MV Vmax: 2 29 m/s  MV Vmean: 1 49 m/s  MV maxP 89 mmHg  MV meanPG: 10 01 mmHg  MVA (VTI): 0 99 cm2  MVA By PHT: 3 77 cm2    [facility] Accredited Echocardiography Laboratory    Prepared and electronically signed by    Millie Cope MD  Signed [id number] 15:08:38       Results for orders placed during the hospital encounter of 18   ROBB    Narrative [facility], [address] [place] [address]  [phone]    Transesophageal Echocardiogram  2D, Doppler, and Color Doppler    Study date:  2018    Patient: Lino Jacinto  MR number: [id number]  Account number: [de-identified]  : 1940  Age: [de-identified] years  Gender: Female  Status: Inpatient  Location: Echo lab  Height: 64 in  Weight: 167 lb  BP: 116/ 52 mmHg    Indications: CVA    Diagnoses: R55  - Syncope and collapse    Sonographer:  SIOBHAN Ndiaye  Primary Physician:  Luis Quinones MD  Referring Physician:  Oscar Moreira MD  Group:  [facility]  Interpreting Physician:  Calderon Huizar MD    SUMMARY    LEFT VENTRICLE:  Systolic function was normal  Ejection fraction was estimated to be 55 %  Although no diagnostic regional wall motion abnormality was identified, this possibility cannot be completely excluded on the basis of this study  Wall thickness was increased  Concentric hypertrophy was present  LEFT ATRIAL APPENDAGE:  No thrombus was identified  ATRIAL SEPTUM:  The septum bows from left to right, consistent with increased left atrial pressure  No defect or patent foramen ovale was identified by color Doppler or after injection of agitated saline  MITRAL VALVE:  There was moderate annular calcification  There was mild to moderate stenosis  Vmax 1 7 m/s, mean gradient 5 6 mm Hg, maximum gradient 11 mm Hg at a HR of 78 BPM   There was mild regurgitation  AORTIC VALVE:  A bioprosthesis was present  It exhibited normal function  TRICUSPID VALVE:  There was moderate to severe regurgitation  Estimated peak PA pressure was 55 mmHg  The findings suggest moderate pulmonary hypertension  HISTORY: PRIOR HISTORY: TAVR, Mitral Stenosis , CABG, MI, Hypertension, Asthma, CHF    PROCEDURE: The procedure was performed in the echo lab  This was a routine study   The risks and alternatives of the procedure were explained to the patient and informed consent was obtained  The transesophageal approach was used  The study  included complete 2D imaging, limited spectral Doppler, and color Doppler  The heart rate was 74 bpm, at the start of the study  An adult omniplane probe was inserted by the attending cardiologist  Intubated with ease  One intubation  attempt(s)  There was no blood detected on the probe  Image quality was adequate  There were no complications during the procedure  MEDICATIONS: Anesthesia administered by anesthesia team     LEFT VENTRICLE: Size was normal  Systolic function was normal  Ejection fraction was estimated to be 55 %  Although no diagnostic regional wall motion abnormality was identified, this possibility cannot be completely excluded on the basis  of this study  Wall thickness was increased  Concentric hypertrophy was present  RIGHT VENTRICLE: The size was normal  Systolic function was normal     LEFT ATRIUM: The atrium was dilated  No thrombus was identified  APPENDAGE: The appendage was dilated  No thrombus was identified  DOPPLER: The function was reduced (reduced emptying velocity)  ATRIAL SEPTUM: The septum bows from left to right, consistent with increased left atrial pressure  No defect or patent foramen ovale was identified by color Doppler or after injection of agitated saline  MITRAL VALVE: There was moderate annular calcification  There was mild-moderate calcification  There was mildly reduced leaflet separation  DOPPLER: There was mild to moderate stenosis  Vmax 1 7 m/s, mean gradient 5 6 mm Hg, maximum  gradient 11 mm Hg at a HR of 78 BPM  There was mild regurgitation  AORTIC VALVE: A bioprosthesis was present  It exhibited normal function  DOPPLER: There was no evidence for stenosis  There was no significant regurgitation  TRICUSPID VALVE: The valve structure was normal  There was normal leaflet separation  There was no echocardiographic evidence of vegetation   DOPPLER: There was moderate to severe regurgitation  Estimated peak PA pressure was 55 mmHg  The  findings suggest moderate pulmonary hypertension  PULMONIC VALVE: Leaflets exhibited normal thickness, no calcification, and normal cuspal separation  There was no echocardiographic evidence of vegetation  PERICARDIUM: There was no pericardial effusion  The pericardium was normal in appearance  AORTA: The root exhibited normal size  There was no atheroma  There was no evidence for dissection  There was no evidence for aneurysm  [address] Accredited [facility]    Prepared and electronically signed by    Lily Bello MD  Signed 18-Apr-2018 14:48:57           Imaging: I have personally reviewed pertinent reports  and I have personally reviewed pertinent films in PACS  Xr Chest 2 Views  Result Date: 8/8/2018  Narrative: CHEST INDICATION:   Shortness of breath  COMPARISON:  6/19/2018, report CTA chest, abdomen and pelvis 3/15/2018 EXAM PERFORMED/VIEWS:  XR CHEST FRONTAL & LATERAL FINDINGS: Heart shadow is enlarged but unchanged from prior exam   Status post median sternotomy with TAVR and coarse mitral valve annulus calcification suggested  The lungs are clear  No pneumothorax or pleural effusion  Degenerative changes of the spine  Impression: Stable cardiomegaly  No acute cardiopulmonary disease  Workstation performed: [id number]     Xr Foot 3+ Vw Left  Result Date: 8/13/2018  Narrative: LEFT FOOT INDICATION:   Right 3rd PP exposed, R medial wound at the level of 1 met head  COMPARISON:  None VIEWS:  XR FOOT 3+ VW LEFT Images: 3 FINDINGS: There is no acute fracture or dislocation  Calcaneal spur(s) noted  No periosteal reaction or cortical destruction to suggest osteomyelitis  Mild dorsal soft tissue swelling noted  Impression: No acute osseous abnormality  Workstation performed: [id number]     Xr Foot 3+ Vw Right  Result Date: 8/13/2018  Narrative: RIGHT FOOT INDICATION:   Left medial 1st met head wound  COMPARISON:  None VIEWS:  XR FOOT 3+ VW RIGHT FINDINGS: There is no acute fracture or dislocation  Moderate hallux valgus  Degenerative changes of the tarsometatarsal joints and midfoot with a moderate size calcaneal spur also noted  Subtle cortical destruction of the distal 3rd phalanx noted concerning for osteomyelitis  Focal soft tissue wound with mild subcutaneous emphysema medial to the 1st metatarsal head  Impression: Osteomyelitis distal 3rd phalanx  Focal soft tissue wound medial to the metatarsal head with no underlying osteomyelitis  The study was marked in [facility] for immediate notification  Workstation performed: [id number]     Stern Mantle & Waveform Analysis, Multiple Levels  Result Date: 8/16/2018  Narrative:  [facility] REPORT CLINICAL: Indications:  Unspecified atherosclerosis [I70 90]  PT S/P right SFA and mid peroneal artery angioplasty for non-healing ulcer/gangrene of right LE digits  Follow-up evaluation  Operative History: 2018-08-10 Right SFA and mid peroneal artery angioplasty CABG Lower extremity a-gram 12/2017 Valve replacement April 2018? ???? Risk Factors The patient has history of Obesity, HTN, Diabetes, AFIB CHF, CKD III, PVD, and CAD  PT is a non-smoker  Clinical Brachial BP:  Right:  IV site  Left:  148/ mm Hg  FINDINGS:  Segment       Rig            Left                          P  W           P  W         Ant  Tibial                   70  Biphasic  Peroneal      110  Biphasic                 Post  Tibial                 150  Biphasic  Ankle         110            150               CONCLUSION: Impression RIGHT LOWER LIMB Ankle/Brachial Index is in the moderate claudication range, 0 74  Prior: Unreliable  PPG/PVR Tracings are biphasic at the distal peroneal artery  Unable to obtain a distal posterior tibial and distal anterior tibial Doppler signal  Unable to compare to the study of 07/05/2018 secondary to prior unreliable SHADI   LEFT LOWER LIMB Ankle/Brachial Index is normal, 1 01   Prior: 0 89  PPG/PVR Tracings are biphasic/monophasic  In comparison to the study of 07/05/2018, there is improvement in the SHADI  TECH NOTE: B/L Metatarsal artery and Great Toe pressures were not obtained due to non-working [place]-SCI  SIGNATURE: Electronically Signed by: Luis Alfredo Blanc MD on 2018-08-16 03:05:58 PM    Ir Abdominal Angiography / Intervention      Result Date: 8/10/2018  Narrative: EXAMINATION: Right lower extremity angiogram with angioplasty INDICATION: 64-year-old female with peripheral arterial disease, dry gangrene of bilateral toes presented with left foot edema and erythema  Left lower extremity angiogram performed on 12 6/19/2017 showed severe three-vessel tibial disease  Patient returns for right lower extremity angiogram with possible intervention  CONTRAST: 42 mL Visipaque 320 intra-arterial FLUOROSCOPY TIME:   40 minutes IMAGES:  156 ANESTHESIA: Moderate sedation and local anesthetic PROCEDURE:  The patient was identified verbally and by wristband  Timeout was performed  Informed consent was obtained  Following obtaining informed consent, the patient was prepped and draped in the usual sterile fashion  All elements of maximal sterile barrier technique, cap and mask and sterile gown and sterile gloves and sterile full-body drape and hand hygiene and 2% chlorhexidine for cutaneous antisepsis  The left common femoral artery was punctured using a 19-gauge needle and single wall technique  Access was secured using a 5-St Helenian sheath  A 5-St Helenian Contra 2 catheter was advanced into the abdominal aorta, led by a Bentson guidewire  The Contra 2 catheter was advanced across the aortic bifurcation and into the right common femoral artery, led by a Glidewire Advantage wire  Runoff angiogram of the right lower extremity was performed  The 5-St Helenian sheath was exchanged for a 5-St Helenian 90 cm Raabe sheath    A 4-St Helenian angled Glide catheter followed by a 0 018 [place] catheter was advanced to the right peroneal artery stenosis, led by V18 and V14 wires  5000 units heparin was administered  The V14 wire was used to recanalized the mid right peroneal artery stenosis  Angioplasty across this stenosis was performed using 2 mm x 80 mm Justin, 2 mm x 60 mm [place], and 2 5 x 15 mm [place] balloons  Angioplasty of the distal SFA was performed 4 mm x 15 mm Justin balloon  Post angioplasty arteriogram was performed  All wires, catheter, and sheath were removed  The left common femoral artery puncture site was closed using Mynx closure device  The patient tolerated the procedure well without apparent immediate complication  The patient was transported to recovery unit in stable condition  Dr Francis Lucas Doctor was present and scrubbed for key portions of the procedure  Findings: 1  The right common femoral, superficial femoral, profunda femoral, popliteal arteries were patent with mild diffuse disease throughout, without focal stenosis  2   Severe three-vessel tibial disease with complete occlusion of the right anterior tibial artery and occluded posterior tibial artery in the proximal segment  3   Right peroneal artery is patent proximally and distally, with focal stenosis in the mid segment  4   The right posterior tibialis and dorsalis pedis reconstitute distally through the peroneal artery and other collaterals  5   Successful recanalization and angioplasty of the mid right peroneal artery and distal SFA which resulted in complete patency of the right peroneal artery and increased luminal diameter of distal SFA  Impression:   1  Mild diffuse nonocclusive disease of the above knee arteries in the right lower extremity  2   Successful recanalization of mid right peroneal artery  3   Occluded right anterior tibialis and posterior tibialis arteries with reconstitution of dorsalis pedis and posterior tibialis through collaterals   Workstation performed: [id number]       EKG reviewed personally: Normal SInus Rhythm LBBB            Assessment  Principal Problem:    Cellulitis  Active Problems:    Essential hypertension    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 native heart without angina pectoris    Type 2 diabetes mellitus with complication, with long-term current use of insulin ([facility])    Atrial flutter (HCC)    Hyperlipidemia    Gastroesophageal reflux disease without esophagitis    Chronic combined systolic and diastolic congestive heart failure (HCC)    Paroxysmal atrial fibrillation (HCC)    Ulcer of toe of left foot (HCC)    Ulcer of toe of right foot ([facility] [address] )    History of CVA (cerebrovascular accident)    Seizure ([facility] [address] )    Anemia    PAD (peripheral artery disease) ([facility])    CKD (chronic kidney disease) stage 3, GFR 30-59 ml/min                   Thank you for allowing us to participate in this patient's care  This pt will follow up with          once discharged  Counseling / Coordination of Care  Total floor / unit time spent today 45 minutes  Greater than 50% of total time was spent with the patient and / or family counseling and / or coordination of care  A description of the counseling / coordination of care: Review of history, current assessment, development of a plan  Code Status: Level 1 - Full Code    ** Please Note: Dragon 360 Dictation voice to text software may have been used in the creation of this document   **

## 2018-08-17 NOTE — DISCHARGE SUMMARY
[facility] CENTRAL Discharge Summary - Jerry Del Castillo [de-identified] y o  female MRN: [id number]    [place] [address] Room / Bed: [place] [address] [phone] Encounter: [id number]    BRIEF OVERVIEW    Admitting Provider: Hannah Sheridan MD  Discharge Provider: Lakshmi Awad MD  Primary Care Physician at Discharge: CLARENCE Carrera  [address]    Admission Date: 8/8/2018     Discharge Date: 8/23/18    Hospital Course  This is an 27-year-old female with past medical history significant for DVT/PE on Xarelto, hypertension, type 2 diabetes mellitus, paroxysmal atrial fibrillation, severe peripheral artery disease, CVA, CHF, GERD, CAD status post CABG and hyperlipidemia who presented on 08/08 with bilateral toe wounds and associated left foot cellulitis  Patient noticed increasing erythema and pain of the left foot prior to admission  She had noticed a yellow discharge coming from the wounds as well  Patient was scheduled for outpatient lower extremity angiography on 08/09 which was canceled due to hospitalization  Initially patient was started on vancomycin which was narrowed to IV Ancef  Due to die intolerance patient underwent preparation and then had IR angiography with angioplasty of the right lower extremity on 08/10  Vascular surgery was consulted and followed along  They recommended postprocedure ABIs  Podiatry was also consulted and recommended a x-ray of the right foot which revealed osteomyelitis of the 3rd toe  Patient completed 7 day course of Ancef  Per ID recommendations antibiotics were then discontinued as the erythema and rubor were thought to be related to circulatory vascular issues as opposed to infectious  Podiatry recommended getting lower extremity arterial Dopplers to make a informed recommendation whether patient should have right toe amputation for osteomyelitis versus right BKA  Unfortunately, the arterial Doppler machine was in disrepair  Patient was kept in the hospital for supportive care and pain control until she could have study done which would determine definitive operative treatment  With the Doppler machine unable to be repaired, patient was taken for angiogram of left lower extremity  Findings of severe multi-vessel disease with poor runoff  No intervention was performed  Discussions between Podiatry and vascular determined that plan of care would be provide local wound care and pain management and watchful waiting versus amputation of left lower extremity  This was discussed with patient and son who requested a 2nd opinion  Second vascular surgeon was able to review patient's case and determined that there is a possible chance for bypass  As patient is moderate to high risk for surgery, it was discussed in length with patient and son that surgery would be 4-6 hours and she is high risk for postoperative cardiac complications  Patient wishes to proceed with procedure  Patient is to be discharged short-term rehab with outpatient follow-up with surgery to set up timing of procedure  Physical Exam   Constitutional: She appears well-developed and well-nourished  No distress  HENT:   Head: Normocephalic and atraumatic  Mouth/Throat: Oropharynx is clear and moist  No oropharyngeal exudate  Eyes: Conjunctivae and EOM are normal  Pupils are equal, round, and reactive to light  Cardiovascular: Normal rate and regular rhythm  Murmur heard  Pulmonary/Chest: Effort normal and breath sounds normal  No respiratory distress  She has no wheezes  She has no rales  Abdominal: Soft  Bowel sounds are normal  She exhibits no distension  There is no tenderness  There is no guarding  Musculoskeletal:   B/l toe ulcerations and necrosis    Neurological: She is alert  Skin: Skin is warm and dry  She is not diaphoretic  Psychiatric: She has a normal mood and affect   Her behavior is normal  Judgment and thought content normal  Presenting Problem/History of Present Illness  Principal Problem:    Cellulitis  Active Problems:    Essential hypertension    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 native heart without angina pectoris    Type 2 diabetes mellitus with complication, with long-term current use of insulin ([facility])    Atrial flutter ([facility])    Hyperlipidemia    Gastroesophageal reflux disease without esophagitis    Chronic combined systolic and diastolic congestive heart failure ([facility])    Paroxysmal atrial fibrillation ([facility])    Ulcer of toe of left foot ([facility])    Ulcer of toe of right foot ([facility] [address] )    History of CVA (cerebrovascular accident)    Seizure ([facility] [address] )    Anemia    PAD (peripheral artery disease) ([facility])    CKD (chronic kidney disease) stage 3, GFR 30-59 ml/min  Resolved Problems:    * No resolved hospital problems  *    1  B/L Toe Wounds with 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 2/2 to severe PAD s/p IR RLE angioplasties of R distal SFA, mid R peroneal artery  - podiatry-local wound care to bilateral feet, no surgical plan for right foot at present     - vascular-Outpatient follow up with Surgery for timing of vascular bypass  - DC to rehab for now   - continue pain control - fentanyl patch, oxycodone, tylenol, gapabentin     Diagnostic Procedures Performed  Imaging Studies:  Xr Chest 2 Views    Result Date: 8/8/2018  Impression: Stable cardiomegaly  No acute cardiopulmonary disease  Workstation performed: [id number]     Xr Foot 3+ Vw Left    Result Date: 8/13/2018  Impression: No acute osseous abnormality  Workstation performed: [id number]     Xr Foot 3+ Vw Right    Result Date: 8/13/2018  Impression: Osteomyelitis distal 3rd phalanx  Focal soft tissue wound medial to the metatarsal head with no underlying osteomyelitis  The study was marked in [facility] for immediate notification  Workstation performed: [id number]     Ir Abdominal Angiography / Intervention    Result Date: 8/10/2018  Impression: Impression: 1    Mild diffuse nonocclusive disease of the above knee arteries in the right lower extremity  2   Successful recanalization of mid right peroneal artery  3   Occluded right anterior tibialis and posterior tibialis arteries with reconstitution of dorsalis pedis and posterior tibialis through collaterals   Workstation performed: [id number]     Pertinent Labs:      Results from last 7 days  Lab Units 08/22/18  0528 08/21/18  0500 08/18/18  0545   SODIUM mmol/L 134* 132* 138   POTASSIUM mmol/L 3 7 4 3 4 3   CHLORIDE mmol/L 98* 99* 102   CO2 mmol/L 29 28 27   BUN mg/dL 27* 16 13   CREATININE mg/dL 0 99 0 97 0 78   CALCIUM mg/dL 8 5 8 6 8 9   GLUCOSE RANDOM mg/dL 182* 205* 84       Therapeutic Procedures Performed  RLE angiogram with angioplasty 8/10/18    Test Results Pending at Discharge: none    Medications     Medication List to be Continued at Discharge  Current Discharge Medication List      CONTINUE these medications which have NOT CHANGED    Details   amiodarone 200 mg tablet Take 1 tablet (200 mg total) by mouth 2 (two) times a day  Qty: 60 tablet, Refills: 3    Associated Diagnoses: Atrial flutter, unspecified type (HCC)      amLODIPine (NORVASC) 5 mg tablet Take 1 tablet (5 mg total) by mouth daily  Qty: 30 tablet, Refills: 5    Associated Diagnoses: Essential hypertension      aspirin 81 MG tablet Take 81 mg by mouth daily      atorvastatin (LIPITOR) 80 mg tablet Take 1 tablet (80 mg total) by mouth daily  Qty: 30 tablet, Refills: 2    Associated Diagnoses: Mixed hyperlipidemia      Cholecalciferol (VITAMIN D3) 1000 units CHEW Chew 1 tablet (1,000 Units total) 2 (two) times a day  Qty: 60 tablet, Refills: 5    Associated Diagnoses: Vitamin D deficiency      esomeprazole (NEXIUM) 20 mg capsule Take 20 mg by mouth every morning before breakfast      ferrous sulfate 325 (65 Fe) mg tablet Take 1 tablet (325 mg total) by mouth daily with breakfast  Qty: 90 tablet, Refills: 1    Associated Diagnoses: Iron deficiency fluticasone (FLONASE) 50 mcg/act nasal spray 1 spray into each nostril daily      furosemide (LASIX) 40 mg tablet Take 1 tablet (40 mg total) by mouth daily  Qty: 90 tablet, Refills: 3    Associated Diagnoses: Chronic systolic heart failure (HCC)      glucose blood test strip 1 each by Other route 4 (four) times a day (before meals and at bedtime) Use as instructed      Insulin Syringe-Needle U-100 (BD INSULIN SYRINGE ULTRAFINE) 31G X 5/16" 1 ML MISC Inject under the skin 2 (two) times a day  Qty: 200 each, Refills: 3    Associated Diagnoses: Type 2 diabetes mellitus with complication, with long-term current use of insulin ([facility])      LANTUS 100 UNIT/ML subcutaneous injection Inject 20 Units under the skin 2 (two) times a day  Qty: 10 mL, Refills: 0    Associated Diagnoses: Type 2 diabetes mellitus with complication, with long-term current use of insulin ([facility])      levETIRAcetam (KEPPRA) 750 mg tablet Take 1 tablet (750 mg total) by mouth every 12 (twelve) hours  Qty: 60 tablet, Refills: 11    Associated Diagnoses: Seizure ([facility])      levothyroxine 100 mcg tablet Take 1 tablet (100 mcg total) by mouth daily  Qty: 90 tablet, Refills: 1    Associated Diagnoses: Hypothyroidism, unspecified type      lisinopril (ZESTRIL) 5 mg tablet Take 1 tablet (5 mg total) by mouth daily  Qty: 90 tablet, Refills: 3    Associated Diagnoses: Chronic systolic heart failure (HCC)      loratadine (CLARITIN) 10 mg tablet Take 1 tablet (10 mg total) by mouth daily  Qty: 90 tablet, Refills: 1    Associated Diagnoses: URI, acute      magnesium oxide (MAG-OX) 400 mg Take 1 tablet (400 mg total) by mouth 2 (two) times a day  Qty: 180 tablet, Refills: 1    Associated Diagnoses: Hypomagnesemia      metFORMIN (GLUCOPHAGE) 1000 MG tablet 1,000 mg 2 (two) times a day      metoclopramide (REGLAN) 10 mg tablet Take 0 5 tablets (5 mg total) by mouth every 6 (six) hours as needed (Nausea and vomiting)  Qty: 30 tablet, Refills: 1    Associated Diagnoses: Viral gastroenteritis      Mometasone Furoate (ASMANEX HFA) 100 MCG/ACT AERO Inhale 2 puffs once daily  Qty: 1 Inhaler, Refills: 5    Associated Diagnoses: Moderate asthma without complication, unspecified whether persistent      pantoprazole (PROTONIX) 40 mg tablet Take 40 mg by mouth daily      potassium chloride (K-DUR,KLOR-CON) 10 mEq tablet Take 1 tablet (10 mEq total) by mouth 2 (two) times a day  Qty: 60 tablet, Refills: 2    Associated Diagnoses: Hypokalemia      rivaroxaban (XARELTO) 20 mg tablet Take 1 tablet (20 mg total) by mouth daily  Qty: 90 tablet, Refills: 0    Associated Diagnoses: Paroxysmal atrial fibrillation (HCC)      acetaminophen (TYLENOL) 650 mg CR tablet Take 1 tablet (650 mg total) by mouth every 8 (eight) hours as needed for mild pain Do not take in conjunction with Percocet  Qty: 90 tablet, Refills: 0    Associated Diagnoses: S/P TAVR (transcatheter aortic valve replacement)      albuterol (VENTOLIN HFA) 90 mcg/act inhaler Inhale 108 mcg 2 (two) times a day as needed 2 puffs bid PRN      calcium carbonate (TUMS) 500 mg chewable tablet Chew 2 tablets 3 (three) times a day        cholecalciferol (VITAMIN D3) 1,000 units tablet Take 1,000 Units by mouth 2 (two) times a day        metoprolol tartrate (LOPRESSOR) 25 mg tablet Take 0 5 tablets (12 5 mg total) by mouth every 12 (twelve) hours  Qty: 90 tablet, Refills: 1    Associated Diagnoses: Paroxysmal atrial fibrillation ([address] );  Atrial flutter, unspecified type ([facility])      nitroglycerin (NITROSTAT) 0 4 mg SL tablet Place 0 4 mg under the tongue every 3 (three) minutes as needed      phenazopyridine (PYRIDIUM) 200 mg tablet Take 1 tablet (200 mg total) by mouth 3 (three) times a day with meals  Qty: 10 tablet, Refills: 0    Associated Diagnoses: Dysuria      polyethylene glycol (MIRALAX) 17 g packet Take 17 g by mouth daily as needed             Current Discharge Medication List      START taking these medications    Details HYDROcodone-acetaminophen (NORCO) 5-325 mg per tablet Take 1 tablet by mouth every 6 (six) hours as needed for pain for up to 4 days Max Daily Amount: 4 tablets  Qty: 18 tablet, Refills: 0    Associated Diagnoses: Cellulitis           Current Discharge Medication List      STOP taking these medications       cephalexin (KEFLEX) 500 mg capsule Comments:   Reason for Stopping:         diphenhydrAMINE (BENADRYL) 50 mg capsule Comments:   Reason for Stopping:         methylPREDNISolone (MEDROL) 32 MG tablet Comments:   Reason for Stopping:               Allergies  Allergies   Allergen Reactions    Other Chest Pain     IVP-listed as "chest pain" in previous chart, patient stated this occurred "a long time ago" but does not remember exactly occurred     Cephalosporins Chest Pain    Contrast [Iodinated Diagnostic Agents] Other (See Comments)     Flash pulm edema    Doxycycline Chest Pain    Ketorolac Other (See Comments)     Chest pain     Levaquin [Levofloxacin] Chest Pain    Ondansetron Chest Pain     Prolonged QT    Toradol [Ketorolac Tromethamine] Chest Pain     Discharge Diet: cardiac diet  Activity restrictions: Per rehab recommendations, Podiatry, vascular recommendations  Discharge Condition: stable  Discharged With Lines: no    Discharge Disposition: Home with [address]  [facility]    [phone] [facility]  [phone]     Next Steps: Follow up      Instructions: call for f/u appt      [place] [address] [phone] [address] [place] [address]     Next Steps: Go to      Instructions: Please call and make an appointment with Dr Vianney Leyva for wound care        Stan Trujillo MD  PCP - General Internal Medicine [phone] [address]     Next Steps: Schedule an appointment as soon as possible for a visit in 2 week(s)              Code Status: Level 1 - Full Code     Discharge  Statement   I spent 30 minutes minutes discharging the patient  This time was spent on the day of discharge  I had direct contact with the patient on the day of discharge  Additional documentation is required if more than 30 minutes were spent on discharge

## 2018-08-17 NOTE — PROGRESS NOTES
IM Residency Progress Note   Unit/Bed#: -01 Encounter: [id number]  SOD Team C       Adam Woodson [de-identified] y o  female [id number]    Hospital Stay Days: 8      Assessment/Plan:    Principal Problem:    Cellulitis  Active Problems:    Essential hypertension    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 native heart without angina pectoris    Type 2 diabetes mellitus with complication, with long-term current use of insulin (HCC)    Atrial flutter (HCC)    Hyperlipidemia    Gastroesophageal reflux disease without esophagitis    Chronic combined systolic and diastolic congestive heart failure (HCC)    Paroxysmal atrial fibrillation (HCC)    Ulcer of toe of left foot (HCC)    Ulcer of toe of right foot (HCC)    History of CVA (cerebrovascular accident)    Seizure ([facility] [address] )    Anemia    PAD (peripheral artery disease) ([facility])    CKD (chronic kidney disease) stage 3, GFR 30-59 ml/min    1  B/L Toe Wounds with 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 2/2 to severe PAD s/p IR RLE angioplasties of R distal SFA, mid R peroneal artery  - podiatry following and plan for either amputation vs BKA on the R pending LEADs  - ABIs pending, machinery reportedly broken at this time  - vascular is following and awaiting ABIs  - Off antibiotics per ID as this is more of chronic circulatory issue and has completed course of antibiotics  - will continue supportive care over the weekend and hopefully machinery will be repaired by Monday and patient can have study done to determine definitve treatment options  - continue pain control with norco 10 and IV dilaudid for breakthrough     2  Combined Diastolic & Systolic Heart Failure   -ROBB on 6/8/0857 showed systolic function was moderately to markedly reduced  Ejection fraction was estimated to be 35 %   -appears euvolemic on exam  -Continue Furosemide 40 mg daily   -Continue Lisinopril 5 mg     3  CAD   -ASA 81 mg   -Atorvastatin 80 mg  -Metoprolol 12 5 mg     4   Paroxysmal Atrial Fibrillation  -NSR  -rates well controlled  -Amiodarone 200 mg  -Xarelto 20 mg daily      5  Type 2 DM  -Metformin and Lantus at home  -Hold Metformin  -increase to Lantus 22 units q12 hours  -Humalog with meals and daily at bedtime      6  GERD  -Pantoprazole 20 mg      7  Microcytic Anemia  -Hgb stable   -Ferrous sulfate every other day      8  Asthma  -Albuterol prn  -Fluticasone 2 puffs q12 hours      9  Seizure Disorder  -Keppra 750 mg q12 hours      10  Hypothyroidism  Levothyroxine 1000 mcg      11  HTN -   - increase amlodipine to 10mg daily  - restart on home lisinopril    Disposition: Continue inpatient care      Subjective:   Patient has continued pain in BL LE  She had no acute events overnight  No new concerns  She and her son are eager to know what the plan is  Vitals: Temp (24hrs), Av 1 °F (36 7 °C), Min:97 7 °F (36 5 °C), Max:98 5 °F (36 9 °C)  Current: Temperature: 97 7 °F (36 5 °C)  Vitals:    08/15/18 2349 18 0717 18 2225 18 0652   BP: 142/73 141/61 164/72 144/64   BP Location: Left arm Left arm Left arm Left arm   Pulse: (!) 54 55 56 58   Resp:  18 18   Temp: 98 7 °F (37 1 °C) 98 2 °F (36 8 °C) 98 5 °F (36 9 °C) 97 7 °F (36 5 °C)   TempSrc: Oral Oral Oral Oral   SpO2: 95% 96% 95% 96%   Weight:       Height:        Body mass index is 27 65 kg/m²  I/O last 24 hours: In: 18 [P O :570]  Out: 700 [Urine:700]      Physical Exam   Constitutional: She is oriented to person, place, and time  She appears well-developed and well-nourished  No distress  HENT:   Head: Normocephalic and atraumatic  Eyes: EOM are normal    Neck: No tracheal deviation present  Cardiovascular: Normal rate and regular rhythm  No murmur heard  Pulmonary/Chest: Effort normal  No stridor  She has rales  Abdominal: Soft  Bowel sounds are normal  She exhibits no distension  Neurological: She is alert and oriented to person, place, and time  Skin: Skin is warm and dry     Boots in place on BL LE   Psychiatric: She has a normal mood and affect  Her behavior is normal    Nursing note and vitals reviewed  Invasive Devices     Peripheral Intravenous Line            Peripheral IV 08/14/18 Right Antecubital 2 days                          Labs:   Recent Results (from the past 24 hour(s))   Fingerstick Glucose (POCT)    Collection Time: 08/16/18  4:09 PM   Result Value Ref Range    POC Glucose 110 65 - 140 mg/dl   Fingerstick Glucose (POCT)    Collection Time: 08/16/18  9:02 PM   Result Value Ref Range    POC Glucose 251 (H) 65 - 140 mg/dl   Fingerstick Glucose (POCT)    Collection Time: 08/17/18  6:36 AM   Result Value Ref Range    POC Glucose 57 (L) 65 - 140 mg/dl   Fingerstick Glucose (POCT)    Collection Time: 08/17/18  7:09 AM   Result Value Ref Range    POC Glucose 83 65 - 140 mg/dl   Fingerstick Glucose (POCT)    Collection Time: 08/17/18 10:41 AM   Result Value Ref Range    POC Glucose 143 (H) 65 - 140 mg/dl       Radiology Results: I have personally reviewed pertinent reports  Other Diagnostic Testing:   I have personally reviewed pertinent reports          Active Meds:   Current Facility-Administered Medications   Medication Dose Route Frequency    acetaminophen (TYLENOL) tablet 650 mg  650 mg Oral Q6H PRN    albuterol (PROVENTIL HFA,VENTOLIN HFA) inhaler 1 puff  1 puff Inhalation BID PRN    amiodarone tablet 200 mg  200 mg Oral Daily With Breakfast    amLODIPine (NORVASC) tablet 10 mg  10 mg Oral Daily    aspirin chewable tablet 81 mg  81 mg Oral Daily    atorvastatin (LIPITOR) tablet 80 mg  80 mg Oral Daily    calcium carbonate (TUMS) chewable tablet 1,000 mg  1,000 mg Oral TID    diphenhydrAMINE (BENADRYL) tablet 50 mg  50 mg Oral 60 Min Pre-Op    docusate sodium (COLACE) capsule 100 mg  100 mg Oral BID    ferrous sulfate tablet 325 mg  325 mg Oral Every Other Day    fluticasone (FLONASE) 50 mcg/act nasal spray 1 spray  1 spray Nasal Daily    fluticasone (FLOVENT HFA) 110 MCG/ACT inhaler 2 puff  2 puff Inhalation Q12H [facility]    HYDROcodone-acetaminophen (NORCO) 5-325 mg per tablet 2 tablet  2 tablet Oral Q4H    insulin glargine (LANTUS) subcutaneous injection 22 Units 0 22 mL  22 Units Subcutaneous Q12H [facility]    insulin lispro (HumaLOG) 100 units/mL subcutaneous injection 2-12 Units  2-12 Units Subcutaneous 4x Daily (AC & HS)    levETIRAcetam (KEPPRA) tablet 750 mg  750 mg Oral Q12H [facility]    levothyroxine tablet 100 mcg  100 mcg Oral Early Morning    lisinopril (ZESTRIL) tablet 5 mg  5 mg Oral Daily    loratadine (CLARITIN) tablet 10 mg  10 mg Oral Daily    magnesium oxide (MAG-OX) tablet 400 mg  400 mg Oral BID    metoclopramide (REGLAN) tablet 5 mg  5 mg Oral Q6H PRN    metoprolol tartrate (LOPRESSOR) partial tablet 12 5 mg  12 5 mg Oral Q12H [facility]    nitroglycerin (NITROSTAT) SL tablet 0 4 mg  0 4 mg Sublingual Q3 min PRN    pantoprazole (PROTONIX) EC tablet 20 mg  20 mg Oral Early Morning    polyethylene glycol (MIRALAX) packet 17 g  17 g Oral BID    rivaroxaban (XARELTO) tablet 15 mg  15 mg Oral Daily         VTE Pharmacologic Prophylaxis: Reason for no pharmacologic prophylaxis Xarelto  VTE Mechanical Prophylaxis: sequential compression device    Kristofer Lopez DO

## 2018-08-17 NOTE — PROGRESS NOTES
Progress Note -Vascular Surgery   Hira Flores [de-identified] y o  female MRN: [id number]  Unit/Bed#: -01 Encounter: [id number]    Assessment:  [de-identified] y/o F w/ PAD, dry gangrene on b/l 1st toes, L 2nd toe, R 3rd toe, who p/w L foot edema and erythema   --8/10: IR RLE angiogram--angioplasties of R distal SFA, mid R peroneal artery       Plan:  ABIs pending- machine broken- may have to obtain outpatient  Further recs would depend on ABIs  Planned for D/C today by primary team  Continue Xarelto    Subjective/Objective   Chief Complaint: None    Subjective: Complains of b/l LE pain, stable  No other complaints    Objective:     Blood pressure 144/64, pulse 58, temperature 97 7 °F (36 5 °C), temperature source Oral, resp  rate 18, height 5' 4" (1 626 m), weight 73 1 kg (161 lb 1 6 oz), SpO2 96 %, not currently breastfeeding  ,Body mass index is 27 65 kg/m²        Intake/Output Summary (Last 24 hours) at 08/17/18 0738  Last data filed at 08/16/18 1536   Gross per 24 hour   Intake              270 ml   Output              500 ml   Net             -230 ml       Invasive Devices     Peripheral Intravenous Line            Peripheral IV 08/14/18 Right Antecubital 2 days                Physical Exam:   Gen: A&O, NAD  Cardio: RRR  Lungs: CTAB  Abd: Soft, non distended, non tender  Ext:  Feet warm, no palp pulses      Lab, Imaging and other studies:CBC: No results found for: WBC, HGB, HCT, MCV, PLT, ADJUSTEDWBC, MCH, MCHC, RDW, MPV, NRBC, CMP: No results found for: NA, K, CL, CO2, ANIONGAP, BUN, CREATININE, GLUCOSE, CALCIUM, AST, ALT, ALKPHOS, PROT, ALBUMIN, BILITOT, EGFR, Coagulation: No results found for: PT, INR, APTT  VTE Pharmacologic Prophylaxis: Reason for no pharmacologic prophylaxis Xarelto  VTE Mechanical Prophylaxis: sequential compression device

## 2018-08-18 LAB
ANION GAP SERPL CALCULATED.3IONS-SCNC: 9 MMOL/L (ref 4–13)
APTT PPP: 29 SECONDS (ref 24–36)
BUN SERPL-MCNC: 13 MG/DL (ref 5–25)
CALCIUM SERPL-MCNC: 8.9 MG/DL (ref 8.3–10.1)
CHLORIDE SERPL-SCNC: 102 MMOL/L (ref 100–108)
CO2 SERPL-SCNC: 27 MMOL/L (ref 21–32)
CREAT SERPL-MCNC: 0.78 MG/DL (ref 0.6–1.3)
ERYTHROCYTE [DISTWIDTH] IN BLOOD BY AUTOMATED COUNT: 17.5 % (ref 11.6–15.1)
GFR SERPL CREATININE-BSD FRML MDRD: 72 ML/MIN/1.73SQ M
GLUCOSE SERPL-MCNC: 156 MG/DL (ref 65–140)
GLUCOSE SERPL-MCNC: 193 MG/DL (ref 65–140)
GLUCOSE SERPL-MCNC: 221 MG/DL (ref 65–140)
GLUCOSE SERPL-MCNC: 84 MG/DL (ref 65–140)
GLUCOSE SERPL-MCNC: 86 MG/DL (ref 65–140)
HCT VFR BLD AUTO: 29.2 % (ref 34.8–46.1)
HGB BLD-MCNC: 9.1 G/DL (ref 11.5–15.4)
INR PPP: 1.37 (ref 0.86–1.17)
MCH RBC QN AUTO: 24.1 PG (ref 26.8–34.3)
MCHC RBC AUTO-ENTMCNC: 31.2 G/DL (ref 31.4–37.4)
MCV RBC AUTO: 77 FL (ref 82–98)
PLATELET # BLD AUTO: 229 THOUSANDS/UL (ref 149–390)
PMV BLD AUTO: 10.1 FL (ref 8.9–12.7)
POTASSIUM SERPL-SCNC: 4.3 MMOL/L (ref 3.5–5.3)
PROTHROMBIN TIME: 17 SECONDS (ref 11.8–14.2)
RBC # BLD AUTO: 3.78 MILLION/UL (ref 3.81–5.12)
SODIUM SERPL-SCNC: 138 MMOL/L (ref 136–145)
WBC # BLD AUTO: 10.53 THOUSAND/UL (ref 4.31–10.16)

## 2018-08-18 PROCEDURE — 99232 SBSQ HOSP IP/OBS MODERATE 35: CPT | Performed by: SURGERY

## 2018-08-18 PROCEDURE — 99232 SBSQ HOSP IP/OBS MODERATE 35: CPT | Performed by: INTERNAL MEDICINE

## 2018-08-18 PROCEDURE — 82948 REAGENT STRIP/BLOOD GLUCOSE: CPT

## 2018-08-18 PROCEDURE — 97535 SELF CARE MNGMENT TRAINING: CPT | Performed by: STUDENT IN AN ORGANIZED HEALTH CARE EDUCATION/TRAINING PROGRAM

## 2018-08-18 PROCEDURE — 99233 SBSQ HOSP IP/OBS HIGH 50: CPT | Performed by: INTERNAL MEDICINE

## 2018-08-18 PROCEDURE — 80048 BASIC METABOLIC PNL TOTAL CA: CPT | Performed by: PHYSICIAN ASSISTANT

## 2018-08-18 PROCEDURE — 85027 COMPLETE CBC AUTOMATED: CPT | Performed by: PHYSICIAN ASSISTANT

## 2018-08-18 PROCEDURE — 85730 THROMBOPLASTIN TIME PARTIAL: CPT | Performed by: PHYSICIAN ASSISTANT

## 2018-08-18 PROCEDURE — 85610 PROTHROMBIN TIME: CPT | Performed by: PHYSICIAN ASSISTANT

## 2018-08-18 RX ORDER — FUROSEMIDE 10 MG/ML
40 INJECTION INTRAMUSCULAR; INTRAVENOUS
Status: DISCONTINUED | OUTPATIENT
Start: 2018-08-18 | End: 2018-08-21

## 2018-08-18 RX ADMIN — CEFAZOLIN SODIUM 1000 MG: 1 SOLUTION INTRAVENOUS at 23:30

## 2018-08-18 RX ADMIN — LORATADINE 10 MG: 10 TABLET ORAL at 09:27

## 2018-08-18 RX ADMIN — FUROSEMIDE 40 MG: 10 INJECTION, SOLUTION INTRAMUSCULAR; INTRAVENOUS at 16:30

## 2018-08-18 RX ADMIN — ATORVASTATIN CALCIUM 80 MG: 80 TABLET, FILM COATED ORAL at 09:26

## 2018-08-18 RX ADMIN — FLUTICASONE PROPIONATE 2 PUFF: 110 AEROSOL, METERED RESPIRATORY (INHALATION) at 21:36

## 2018-08-18 RX ADMIN — FLUTICASONE PROPIONATE 1 SPRAY: 50 SPRAY, METERED NASAL at 09:30

## 2018-08-18 RX ADMIN — AMLODIPINE BESYLATE 10 MG: 10 TABLET ORAL at 09:25

## 2018-08-18 RX ADMIN — CEFAZOLIN SODIUM 1000 MG: 1 SOLUTION INTRAVENOUS at 16:33

## 2018-08-18 RX ADMIN — ACETAMINOPHEN 975 MG: 325 TABLET, FILM COATED ORAL at 14:14

## 2018-08-18 RX ADMIN — LISINOPRIL 5 MG: 5 TABLET ORAL at 09:27

## 2018-08-18 RX ADMIN — ACETAMINOPHEN 975 MG: 325 TABLET, FILM COATED ORAL at 21:34

## 2018-08-18 RX ADMIN — INSULIN LISPRO 2 UNITS: 100 INJECTION, SOLUTION INTRAVENOUS; SUBCUTANEOUS at 16:35

## 2018-08-18 RX ADMIN — INSULIN LISPRO 2 UNITS: 100 INJECTION, SOLUTION INTRAVENOUS; SUBCUTANEOUS at 21:35

## 2018-08-18 RX ADMIN — AMIODARONE HYDROCHLORIDE 200 MG: 200 TABLET ORAL at 07:57

## 2018-08-18 RX ADMIN — ACETAMINOPHEN 975 MG: 325 TABLET, FILM COATED ORAL at 06:15

## 2018-08-18 RX ADMIN — METOPROLOL TARTRATE 12.5 MG: 25 TABLET ORAL at 09:25

## 2018-08-18 RX ADMIN — INSULIN LISPRO 4 UNITS: 100 INJECTION, SOLUTION INTRAVENOUS; SUBCUTANEOUS at 10:58

## 2018-08-18 RX ADMIN — ASPIRIN 81 MG 81 MG: 81 TABLET ORAL at 09:26

## 2018-08-18 RX ADMIN — Medication 1000 MG: at 21:35

## 2018-08-18 RX ADMIN — LEVETIRACETAM 750 MG: 750 TABLET ORAL at 09:25

## 2018-08-18 RX ADMIN — FERROUS SULFATE TAB 325 MG (65 MG ELEMENTAL FE) 325 MG: 325 (65 FE) TAB at 09:27

## 2018-08-18 RX ADMIN — LEVETIRACETAM 750 MG: 750 TABLET ORAL at 21:35

## 2018-08-18 RX ADMIN — INSULIN GLARGINE 22 UNITS: 100 INJECTION, SOLUTION SUBCUTANEOUS at 21:35

## 2018-08-18 RX ADMIN — PANTOPRAZOLE SODIUM 20 MG: 20 TABLET, DELAYED RELEASE ORAL at 06:15

## 2018-08-18 RX ADMIN — CEFAZOLIN SODIUM 1000 MG: 1 SOLUTION INTRAVENOUS at 00:03

## 2018-08-18 RX ADMIN — Medication 400 MG: at 17:06

## 2018-08-18 RX ADMIN — OXYCODONE HYDROCHLORIDE 5 MG: 5 TABLET ORAL at 16:24

## 2018-08-18 RX ADMIN — LEVOTHYROXINE SODIUM 100 MCG: 100 TABLET ORAL at 06:15

## 2018-08-18 RX ADMIN — OXYCODONE HYDROCHLORIDE 5 MG: 5 TABLET ORAL at 23:29

## 2018-08-18 RX ADMIN — GABAPENTIN 100 MG: 100 CAPSULE ORAL at 21:35

## 2018-08-18 RX ADMIN — RIVAROXABAN 15 MG: 15 TABLET, FILM COATED ORAL at 09:26

## 2018-08-18 RX ADMIN — FUROSEMIDE 40 MG: 10 INJECTION, SOLUTION INTRAMUSCULAR; INTRAVENOUS at 12:53

## 2018-08-18 RX ADMIN — OXYCODONE HYDROCHLORIDE 5 MG: 5 TABLET ORAL at 09:43

## 2018-08-18 RX ADMIN — Medication 400 MG: at 09:25

## 2018-08-18 RX ADMIN — INSULIN GLARGINE 22 UNITS: 100 INJECTION, SOLUTION SUBCUTANEOUS at 09:27

## 2018-08-18 RX ADMIN — CEFAZOLIN SODIUM 1000 MG: 1 SOLUTION INTRAVENOUS at 09:19

## 2018-08-18 RX ADMIN — METOPROLOL TARTRATE 12.5 MG: 25 TABLET ORAL at 21:34

## 2018-08-18 RX ADMIN — FLUTICASONE PROPIONATE 2 PUFF: 110 AEROSOL, METERED RESPIRATORY (INHALATION) at 09:30

## 2018-08-18 NOTE — OCCUPATIONAL THERAPY NOTE
08/18/18 1447   Restrictions/Precautions   Weight Bearing Precautions Per Order Yes   RLE Weight Bearing Per Order WBAT   LLE Weight Bearing Per Order WBAT   Other Precautions Fall Risk;Pain   Pain Assessment   Pain Assessment 0-10   Pain Score 8   ADL   Where Assessed Edge of bed   LB Dressing Assistance 5  Supervision/Setup   LB Dressing Deficit Setup   LB Dressing Comments don/doff socks    Toileting Assistance  5  Supervision/Setup   Toileting Deficit Setup   Toileting Comments bladder management   Bed Mobility   Supine to Sit 5  Supervision   Sit to Supine 5  Supervision   Transfers   Sit to Stand 5  Supervision   Stand to Sit 5  Supervision   Stand pivot 5  Supervision   Toilet transfer 5  Supervision   Cognition   Overall Cognitive Status [facility]   Arousal/Participation Alert   Attention Within functional limits   Orientation Level Oriented X4   Memory Within functional limits   Following Commands Follows one step commands without difficulty   Activity Tolerance   Activity Tolerance Patient tolerated treatment well   Assessment   Assessment Pt participates in OT session with focus on toileting, LB dressing, bed mobility, and transfers to increase I for d/c  Pt setup LB dressing to don socks while seated at EOB  Pt superivision sit to stand with RW support  Pt supervision toilet transfer with grab bar support  Pt supervision toileting for bladder management and able to manage toilet hygiene  Pt ends session seated in bedside chair  Pt will continue to benefit from activity tolerance, adls, and transfers  Plan   Treatment Interventions ADL retraining;Functional transfer training; Endurance training; Activityengagement   Goal Expiration Date 08/24/18   Treatment Day 3   OT Frequency 3-5x/wk   Recommendation   OT Discharge Recommendation Short Term Rehab

## 2018-08-18 NOTE — PLAN OF CARE
Problem: OCCUPATIONAL THERAPY ADULT  Goal: Performs self-care activities at highest level of function for planned discharge setting  See evaluation for individualized goals  Treatment Interventions: ADL retraining, Functional transfer training, Endurance training, UE strengthening/ROM, Patient/family training, Equipment evaluation/education, Compensatory technique education, Energy conservation, Activityengagement          See flowsheet documentation for full assessment, interventions and recommendations  Outcome: Progressing  Limitation: Decreased ADL status, Decreased endurance, Decreased high-level ADLs, Decreased self-care trans  Prognosis: Good  Assessment: Pt participates in OT session with focus on toileting, LB dressing, bed mobility, and transfers to increase I for d/c  Pt setup LB dressing to don socks while seated at EOB  Pt superivision sit to stand with RW support  Pt supervision toilet transfer with grab bar support  Pt supervision toileting for bladder management and able to manage toilet hygiene  Pt ends session seated in bedside chair  Pt will continue to benefit from activity tolerance, adls, and transfers       OT Discharge Recommendation: Short Term Rehab  OT - OK to Discharge:  (when medically stable)

## 2018-08-18 NOTE — PROGRESS NOTES
IM Residency Progress Note   Unit/Bed#: -01 Encounter: [id number]  SOD Team C       Martha Li [de-identified] y o  female [id number]    Hospital Stay Days: 9      Assessment/Plan:    1  B/L Toe Wounds with 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 2/2 to severe PAD s/p IR RLE angioplasties of R distal SFA, mid R peroneal artery  - podiatry following and plan for either amputation vs BKA on the R pending LEADs  - ABIs pending, machinery reportedly broken at this time  - vascular is following and awaiting ABIs  - started cefazolin per ID  - will continue supportive care over the weekend and hopefully machinery will be repaired by Monday and patient can have study done to determine definitve treatment options  - continue pain control - oxycodone and tylenol     2  Combined Diastolic & Systolic Heart Failure   -ROBB on 4/3/3590 showed systolic function was moderately to markedly reduced  Ejection fraction was estimated to be 35 %   -appears euvolemic on exam  - continue Furosemide 40 mg daily, Lisinopril 5 mg     3  CAD - continue ASA 81 mg, Atorvastatin 80 mg, Metoprolol 12 5 mg     4  Paroxysmal Atrial Fibrillation - NSR  - rates well controlled  - Amiodarone 200 mg  - Xarelto 20 mg daily      5  Type 2 DM  - Metformin and Lantus at home  - Hold Metformin  - increase to Lantus 22 units q12 hours  - Humalog with meals and daily at bedtime      6  GERD - continue pantoprazole 20 mg      7  Microcytic Anemia - Hgb stable   -Ferrous sulfate every other day      8  Asthma - Albuterol prn  - Fluticasone 2 puffs q12 hours      9  Seizure Disorder - continue keppra 750 mg q12 hours      10  Hypothyroidism - levothyroxine 1000 mcg      11  HTN - cont   amlodipine to 10mg daily, lisinopril      - Preoperative evaluation completed by cardiology, consult 8/17    Principal Problem:    Cellulitis  Active Problems:    Essential hypertension    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 native heart without angina pectoris Type 2 diabetes mellitus with complication, with long-term current use of insulin (HCC)    Atrial flutter (HCC)    Hyperlipidemia    Gastroesophageal reflux disease without esophagitis    Chronic combined systolic and diastolic congestive heart failure (HCC)    Paroxysmal atrial fibrillation (HCC)    Ulcer of toe of left foot (HCC)    Ulcer of toe of right foot (HCC)    History of CVA (cerebrovascular accident)    Seizure ([facility] [address] )    Anemia    PAD (peripheral artery disease) (HCC)    CKD (chronic kidney disease) stage 3, GFR 30-59 ml/min        Disposition: per SOD       Subjective: Feels fine, no complaints       Vitals: Temp (24hrs), Av 4 °F (36 9 °C), Min:98 2 °F (36 8 °C), Max:98 6 °F (37 °C)  Current: Temperature: 98 6 °F (37 °C)  Vitals:    18 1509 18 2112 18 0053 18 0733   BP: 140/90 144/65 129/67 157/77   BP Location: Left arm  Left arm Left arm   Pulse: 56 59 (!) 54 58   Resp:    Temp: 98 2 °F (36 8 °C)  98 5 °F (36 9 °C) 98 6 °F (37 °C)   TempSrc: Oral  Oral Oral   SpO2: 97%  91% 98%   Weight:       Height:        Body mass index is 27 65 kg/m²  I/O last 24 hours: In: 56 [P O :690; IV Piggyback:50]  Out: 1000 [Urine:1000]      Physical Exam:   Physical Exam   Constitutional: She appears well-developed and well-nourished  HENT:   Head: Normocephalic and atraumatic  Mouth/Throat: No oropharyngeal exudate  Eyes: Conjunctivae are normal  Pupils are equal, round, and reactive to light  Right eye exhibits no discharge  Left eye exhibits no discharge  No scleral icterus  Neck: Neck supple  Cardiovascular: Normal rate and regular rhythm  No murmur heard  Pulmonary/Chest: Effort normal and breath sounds normal  No respiratory distress  Abdominal: Soft  She exhibits no distension  There is no tenderness  Musculoskeletal: She exhibits no edema  Neurological: She is alert  Skin: Skin is dry  Psychiatric: She has a normal mood and affect         Invasive Devices     Peripheral Intravenous Line            Peripheral IV 08/14/18 Right Antecubital 3 days                Labs:   Recent Results (from the past 24 hour(s))   Fingerstick Glucose (POCT)    Collection Time: 08/17/18  4:16 PM   Result Value Ref Range    POC Glucose 158 (H) 65 - 140 mg/dl   Fingerstick Glucose (POCT)    Collection Time: 08/17/18  9:01 PM   Result Value Ref Range    POC Glucose 214 (H) 65 - 140 mg/dl   Basic metabolic panel    Collection Time: 08/18/18  5:45 AM   Result Value Ref Range    Sodium 138 136 - 145 mmol/L    Potassium 4 3 3 5 - 5 3 mmol/L    Chloride 102 100 - 108 mmol/L    CO2 27 21 - 32 mmol/L    Anion Gap 9 4 - 13 mmol/L    BUN 13 5 - 25 mg/dL    Creatinine 0 78 0 60 - 1 30 mg/dL    Glucose 84 65 - 140 mg/dL    Calcium 8 9 8 3 - 10 1 mg/dL    eGFR 72 ml/min/1 73sq m   APTT    Collection Time: 08/18/18  5:45 AM   Result Value Ref Range    PTT 29 24 - 36 seconds   Protime-INR    Collection Time: 08/18/18  5:45 AM   Result Value Ref Range    Protime 17 0 (H) 11 8 - 14 2 seconds    INR 1 37 (H) 0 86 - 1 17   Fingerstick Glucose (POCT)    Collection Time: 08/18/18  6:56 AM   Result Value Ref Range    POC Glucose 86 65 - 140 mg/dl   Fingerstick Glucose (POCT)    Collection Time: 08/18/18 10:26 AM   Result Value Ref Range    POC Glucose 221 (H) 65 - 140 mg/dl       Radiology Results: I have personally reviewed pertinent reports  Other Diagnostic Testing:   I have personally reviewed pertinent reports          Active Meds:   Current Facility-Administered Medications   Medication Dose Route Frequency    acetaminophen (TYLENOL) tablet 975 mg  975 mg Oral Q8H [address]    albuterol (PROVENTIL HFA,VENTOLIN HFA) inhaler 1 puff  1 puff Inhalation BID PRN    amiodarone tablet 200 mg  200 mg Oral Daily With Breakfast    amLODIPine (NORVASC) tablet 10 mg  10 mg Oral Daily    aspirin chewable tablet 81 mg  81 mg Oral Daily    atorvastatin (LIPITOR) tablet 80 mg  80 mg Oral Daily    calcium carbonate (TUMS) chewable tablet 1,000 mg  1,000 mg Oral TID    ceFAZolin (ANCEF) IVPB (premix) 1,000 mg  1,000 mg Intravenous Q8H    diphenhydrAMINE (BENADRYL) tablet 50 mg  50 mg Oral 60 Min Pre-Op    docusate sodium (COLACE) capsule 100 mg  100 mg Oral BID    ferrous sulfate tablet 325 mg  325 mg Oral Every Other Day    fluticasone (FLONASE) 50 mcg/act nasal spray 1 spray  1 spray Nasal Daily    fluticasone (FLOVENT HFA) 110 MCG/ACT inhaler 2 puff  2 puff Inhalation Q12H [address]    furosemide (LASIX) injection 40 mg  40 mg Intravenous BID (diuretic)    gabapentin (NEURONTIN) capsule 100 mg  100 mg Oral HS    insulin glargine (LANTUS) subcutaneous injection 22 Units 0 22 mL  22 Units Subcutaneous Q12H [address]    insulin lispro (HumaLOG) 100 units/mL subcutaneous injection 2-12 Units  2-12 Units Subcutaneous 4x Daily (AC & HS)    levETIRAcetam (KEPPRA) tablet 750 mg  750 mg Oral Q12H [address]    levothyroxine tablet 100 mcg  100 mcg Oral Early Morning    lisinopril (ZESTRIL) tablet 5 mg  5 mg Oral Daily    loratadine (CLARITIN) tablet 10 mg  10 mg Oral Daily    magnesium oxide (MAG-OX) tablet 400 mg  400 mg Oral BID    metoclopramide (REGLAN) tablet 5 mg  5 mg Oral Q6H PRN    metoprolol tartrate (LOPRESSOR) partial tablet 12 5 mg  12 5 mg Oral Q12H LANCE    nitroglycerin (NITROSTAT) SL tablet 0 4 mg  0 4 mg Sublingual Q3 min PRN    oxyCODONE (ROXICODONE) IR tablet 5 mg  5 mg Oral Q4H PRN    pantoprazole (PROTONIX) EC tablet 20 mg  20 mg Oral Early Morning    polyethylene glycol (MIRALAX) packet 17 g  17 g Oral BID    rivaroxaban (XARELTO) tablet 15 mg  15 mg Oral Daily         VTE Pharmacologic Prophylaxis: Reason for no pharmacologic prophylaxis xarelto  VTE Mechanical Prophylaxis: sequential compression device    Chata Fuchs MD

## 2018-08-18 NOTE — PROGRESS NOTES
Progress Note - Vascular Surgery   Germania Box [de-identified] y o  female MRN: [id number]  Unit/Bed#: [facility]4-01 Encounter: [id number]    Assessment:  [de-identified] F with PAD, dry gangrene on b/l 1st toes, L 2nd toe, R 3rd toe, presenting with L foot edema and erythema    - 8/10 IR RLE angiogram w/ angioplastie of R distal SFA, mid R peroneal artery    Plan:  - RLE continue local wound care and healing assessment  - LLE - revascularization options   - F/u Vein Mapping 8/17   - Cardiac Risk stratification  - Continue Abx - Ancef      Subjective/Objective   Chief Complaint:     Subjective: No acute events overnight  Patient denies fevers, states she did feel subjective chills  Resting comfortably in bed  Objective:     Blood pressure 129/67, pulse (!) 54, temperature 98 5 °F (36 9 °C), temperature source Oral, resp  rate 18, height 5' 4" (1 626 m), weight 73 1 kg (161 lb 1 6 oz), SpO2 91 %, not currently breastfeeding  ,Body mass index is 27 65 kg/m²  Intake/Output Summary (Last 24 hours) at 08/18/18 0518  Last data filed at 08/18/18 0118   Gross per 24 hour   Intake              740 ml   Output              600 ml   Net              140 ml       Invasive Devices     Peripheral Intravenous Line            Peripheral IV 08/14/18 Right Antecubital 3 days                Physical Exam: General: AAOx3  Respiratory: BS b/l  Abdomen: Soft, NT, no rebound/guarding  Heart: rodrigue cardia, regular rhythm, S1s2  Ext: RLE - Wrapped with kerlix, visible dry gangrene of R 1st and 3rd toe  LLE - 2+ pitting edema, visible gangrene on medial aspect 1st Toe, and dry gangrene on 2nd toe  B/l sensation and motor function in extremities - pain to minimal touching    Pulses B/l doppler PT  Doppler DP L  Palp R/L Fem    Lab, Imaging and other studies:I have personally reviewed pertinent lab results  ,   8/17 Vein Mapping - Impression:  RIGHT LOWER LIMB:  The great saphenous vein is patent  from the groin to the ankle    The vein is of adequate caliber and quality for graft conduit with intraluminal  diameters ranging from 9 7 mm to 2 3 mm throughout the leg  LEFT LOWER LIMB:  The great saphenous vein was harvest for bypass graft in the thigh  The vein is of adequate caliber and quality for graft conduit below the knee  with intraluminal diameters ranging from 3 5 mm to 2 3 mm throughout the leg      CBC: No results found for: WBC, HGB, HCT, MCV, PLT, ADJUSTEDWBC, MCH, MCHC, RDW, MPV, NRBC, CMP: No results found for: NA, K, CL, CO2, ANIONGAP, BUN, CREATININE, GLUCOSE, CALCIUM, AST, ALT, ALKPHOS, PROT, ALBUMIN, BILITOT, EGFR  VTE Pharmacologic Prophylaxis: Reason for no pharmacologic prophylaxis on Xarelto  VTE Mechanical Prophylaxis: sequential compression device

## 2018-08-18 NOTE — PROGRESS NOTES
Progress Note - Infectious Disease   Venia Boys [de-identified] y o  female MRN: [id number]  Unit/Bed#: -01 Encounter: [id number]      Impression/Plan:  1   Chronic bilateral foot ischemic ulcers with possible left foot cellulitis  Now with possible progressive erythema, swelling, and pain involving left forefoot  Modestly improved since yesterday since starting the cefazolin  Consider chronic osteomyelitis of right 3rd toe  Positive wound cultures likely represent wound colonizers  Clinically stable without sepsis  Vascular and Podiatry concerned that this is a progressive infection  Rec:  ? Continue cefazolin for now  ? Await cardiac evaluation determine recommendations as far as revascularization  ? Continue [address] per podiatry  ? Await final surgical plan from vascular surgery and podiatry  ? Follow temperatures closely  ? Supportive care as per the primary service     2   PAD  Status PTA  Rec:  ? Close vascular surgery follow-up  ? Cardiac evaluation pending and will help dictate revascularization recommendations    Antibiotics:  Cefazolin 2    Subjective:  Patient has no fever, chills, sweats; no nausea, vomiting, diarrhea; no cough, shortness of breath; no increased pain  No new symptoms  Objective:  Vitals:  HR:  [54-59] 58  Resp:  [18] 18  BP: (129-157)/(65-90) 157/77  SpO2:  [91 %-98 %] 98 %  Temp (24hrs), Av 4 °F (36 9 °C), Min:98 2 °F (36 8 °C), Max:98 6 °F (37 °C)  Current: Temperature: 98 6 °F (37 °C)    Physical Exam:   General Appearance:  Alert, interactive, nontoxic, no acute distress  Throat: Oropharynx moist without lesions  Lungs:   Clear to auscultation bilaterally; no wheezes, rhonchi or rales; respirations unlabored   Heart:  RRR; no murmur, rub or gallop   Abdomen:   Soft, non-tender, non-distended, positive bowel sounds  Extremities: No clubbing, cyanosis  Left foot with slightly decreased edema and stable erythema of the forefoot and calf   Skin: No new rashes or lesions   No draining wounds noted         Labs, Imaging, & Other studies:   All pertinent labs and imaging studies were personally reviewed    Results from last 7 days  Lab Units 08/16/18  0607 08/15/18  0540 08/14/18  0530   WBC Thousand/uL 12 12* 10 38* 10 27*   HEMOGLOBIN g/dL 9 8* 9 3* 10 1*   PLATELETS Thousands/uL 244 220 232       Results from last 7 days  Lab Units 08/18/18  0545 08/16/18  0607 08/15/18  0540   SODIUM mmol/L 138 139 138   POTASSIUM mmol/L 4 3 4 2 4 0   CHLORIDE mmol/L 102 105 105   CO2 mmol/L 27 29 27   ANION GAP mmol/L 9 5 6   BUN mg/dL 13 17 12   CREATININE mg/dL 0 78 0 76 0 67   EGFR ml/min/1 73sq m 72 74 83   GLUCOSE RANDOM mg/dL 84 66 75   CALCIUM mg/dL 8 9 8 5 8 1*       Results from last 7 days  Lab Units 08/12/18  1225   GRAM STAIN RESULT  No polys seen  1+ Gram positive cocci in clusters  No polys seen  3+ Gram positive cocci in clusters   WOUND CULTURE  2+ Growth of Klebsiella oxytoca*  2+ Growth of Staphylococcus aureus*  2+ Growth of Citrobacter amalonaticus complex*  2+ Growth of Staphylococcus aureus*

## 2018-08-19 LAB
GLUCOSE SERPL-MCNC: 158 MG/DL (ref 65–140)
GLUCOSE SERPL-MCNC: 177 MG/DL (ref 65–140)
GLUCOSE SERPL-MCNC: 282 MG/DL (ref 65–140)
GLUCOSE SERPL-MCNC: 63 MG/DL (ref 65–140)
GLUCOSE SERPL-MCNC: 86 MG/DL (ref 65–140)

## 2018-08-19 PROCEDURE — 99232 SBSQ HOSP IP/OBS MODERATE 35: CPT | Performed by: INTERNAL MEDICINE

## 2018-08-19 PROCEDURE — 99232 SBSQ HOSP IP/OBS MODERATE 35: CPT | Performed by: SURGERY

## 2018-08-19 PROCEDURE — 97530 THERAPEUTIC ACTIVITIES: CPT

## 2018-08-19 PROCEDURE — 99233 SBSQ HOSP IP/OBS HIGH 50: CPT | Performed by: INTERNAL MEDICINE

## 2018-08-19 PROCEDURE — 82948 REAGENT STRIP/BLOOD GLUCOSE: CPT

## 2018-08-19 PROCEDURE — 97535 SELF CARE MNGMENT TRAINING: CPT

## 2018-08-19 RX ORDER — SODIUM CHLORIDE 9 MG/ML
75 INJECTION, SOLUTION INTRAVENOUS CONTINUOUS
Status: DISCONTINUED | OUTPATIENT
Start: 2018-08-20 | End: 2018-08-20

## 2018-08-19 RX ORDER — INSULIN GLARGINE 100 [IU]/ML
16 INJECTION, SOLUTION SUBCUTANEOUS
Status: DISCONTINUED | OUTPATIENT
Start: 2018-08-19 | End: 2018-08-21

## 2018-08-19 RX ORDER — INSULIN GLARGINE 100 [IU]/ML
22 INJECTION, SOLUTION SUBCUTANEOUS EVERY MORNING
Status: DISCONTINUED | OUTPATIENT
Start: 2018-08-20 | End: 2018-08-21

## 2018-08-19 RX ADMIN — INSULIN GLARGINE 16 UNITS: 100 INJECTION, SOLUTION SUBCUTANEOUS at 21:08

## 2018-08-19 RX ADMIN — POLYETHYLENE GLYCOL 3350 17 G: 17 POWDER, FOR SOLUTION ORAL at 08:53

## 2018-08-19 RX ADMIN — LEVOTHYROXINE SODIUM 100 MCG: 100 TABLET ORAL at 05:42

## 2018-08-19 RX ADMIN — FUROSEMIDE 40 MG: 10 INJECTION, SOLUTION INTRAMUSCULAR; INTRAVENOUS at 16:30

## 2018-08-19 RX ADMIN — LEVETIRACETAM 750 MG: 750 TABLET ORAL at 21:00

## 2018-08-19 RX ADMIN — FLUTICASONE PROPIONATE 2 PUFF: 110 AEROSOL, METERED RESPIRATORY (INHALATION) at 08:53

## 2018-08-19 RX ADMIN — ACETAMINOPHEN 975 MG: 325 TABLET, FILM COATED ORAL at 21:00

## 2018-08-19 RX ADMIN — DOCUSATE SODIUM 100 MG: 100 CAPSULE, LIQUID FILLED ORAL at 17:33

## 2018-08-19 RX ADMIN — PANTOPRAZOLE SODIUM 20 MG: 20 TABLET, DELAYED RELEASE ORAL at 05:42

## 2018-08-19 RX ADMIN — FUROSEMIDE 40 MG: 10 INJECTION, SOLUTION INTRAMUSCULAR; INTRAVENOUS at 08:23

## 2018-08-19 RX ADMIN — FLUTICASONE PROPIONATE 2 PUFF: 110 AEROSOL, METERED RESPIRATORY (INHALATION) at 21:00

## 2018-08-19 RX ADMIN — INSULIN GLARGINE 22 UNITS: 100 INJECTION, SOLUTION SUBCUTANEOUS at 08:53

## 2018-08-19 RX ADMIN — LISINOPRIL 5 MG: 5 TABLET ORAL at 08:52

## 2018-08-19 RX ADMIN — INSULIN LISPRO 2 UNITS: 100 INJECTION, SOLUTION INTRAVENOUS; SUBCUTANEOUS at 16:26

## 2018-08-19 RX ADMIN — OXYCODONE HYDROCHLORIDE 5 MG: 5 TABLET ORAL at 05:42

## 2018-08-19 RX ADMIN — FLUTICASONE PROPIONATE 1 SPRAY: 50 SPRAY, METERED NASAL at 08:53

## 2018-08-19 RX ADMIN — METOPROLOL TARTRATE 12.5 MG: 25 TABLET ORAL at 08:52

## 2018-08-19 RX ADMIN — DOCUSATE SODIUM 100 MG: 100 CAPSULE, LIQUID FILLED ORAL at 08:53

## 2018-08-19 RX ADMIN — AMIODARONE HYDROCHLORIDE 200 MG: 200 TABLET ORAL at 07:41

## 2018-08-19 RX ADMIN — Medication 400 MG: at 17:33

## 2018-08-19 RX ADMIN — METOPROLOL TARTRATE 12.5 MG: 25 TABLET ORAL at 21:04

## 2018-08-19 RX ADMIN — ACETAMINOPHEN 975 MG: 325 TABLET, FILM COATED ORAL at 05:42

## 2018-08-19 RX ADMIN — LORATADINE 10 MG: 10 TABLET ORAL at 08:53

## 2018-08-19 RX ADMIN — ASPIRIN 81 MG 81 MG: 81 TABLET ORAL at 08:52

## 2018-08-19 RX ADMIN — LEVETIRACETAM 750 MG: 750 TABLET ORAL at 08:52

## 2018-08-19 RX ADMIN — ATORVASTATIN CALCIUM 80 MG: 80 TABLET, FILM COATED ORAL at 08:52

## 2018-08-19 RX ADMIN — OXYCODONE HYDROCHLORIDE 5 MG: 5 TABLET ORAL at 14:42

## 2018-08-19 RX ADMIN — Medication 400 MG: at 08:52

## 2018-08-19 RX ADMIN — SODIUM CHLORIDE 75 ML/HR: 0.9 INJECTION, SOLUTION INTRAVENOUS at 23:40

## 2018-08-19 RX ADMIN — INSULIN LISPRO 6 UNITS: 100 INJECTION, SOLUTION INTRAVENOUS; SUBCUTANEOUS at 11:32

## 2018-08-19 RX ADMIN — CEFAZOLIN SODIUM 1000 MG: 1 SOLUTION INTRAVENOUS at 16:32

## 2018-08-19 RX ADMIN — INSULIN LISPRO 2 UNITS: 100 INJECTION, SOLUTION INTRAVENOUS; SUBCUTANEOUS at 21:04

## 2018-08-19 RX ADMIN — CEFAZOLIN SODIUM 1000 MG: 1 SOLUTION INTRAVENOUS at 23:40

## 2018-08-19 RX ADMIN — ACETAMINOPHEN 975 MG: 325 TABLET, FILM COATED ORAL at 14:42

## 2018-08-19 RX ADMIN — GABAPENTIN 100 MG: 100 CAPSULE ORAL at 21:01

## 2018-08-19 RX ADMIN — AMLODIPINE BESYLATE 10 MG: 10 TABLET ORAL at 08:52

## 2018-08-19 RX ADMIN — CEFAZOLIN SODIUM 1000 MG: 1 SOLUTION INTRAVENOUS at 08:27

## 2018-08-19 NOTE — PROGRESS NOTES
Progress Note - Vascular Surgery   Matilde Tyron [de-identified] y o  female MRN: [id number]  Unit/Bed#: -01 Encounter: [id number]    Assessment:  [de-identified] F w/ PAD, dry gangrene on b/l 1st toes, L 2nd toe, R 3rd toe now with L foot edema and erythema    - 8/10 IR RLE angiogram with angioplasty of R distal SFA and mid R peroneal artery    Plan:  - Follow up Cardiac Risk Assessment   - Pending cardiac risks final rec's per revascularization to come   - Possible LLE Pop to AT artery bypass  - RLE continue local wound care  - F/u Acute Pain Service rec's    Subjective/Objective   Chief Complaint:     Subjective: No acute events  Patient still with pain in feet  Sleeping with left leg hanging off the bed  Objective:     Blood pressure 146/56, pulse 56, temperature 97 6 °F (36 4 °C), temperature source Oral, resp  rate 16, height 5' 4" (1 626 m), weight 73 1 kg (161 lb 1 6 oz), SpO2 96 %, not currently breastfeeding  ,Body mass index is 27 65 kg/m²  Intake/Output Summary (Last 24 hours) at 08/19/18 0038  Last data filed at 08/18/18 2135   Gross per 24 hour   Intake              230 ml   Output             2900 ml   Net            -2670 ml       Invasive Devices     Peripheral Intravenous Line            Peripheral IV 08/18/18 Right Forearm less than 1 day                Physical Exam: General: AAOx3  Respiratory: BS b/l  Abdomen: Soft, NT, no rebound/guarding  Heart: Bradycardia, regular rhythm, S1s2  Ext: RLE   LLE - hanging off edge of bed, 2+ pitting edema, visible gangrene on medial aspect of 1st toe with dry gangrene on second toe - Pain to minimal touching    Lab, Imaging and other studies:  I have personally reviewed pertinent lab results    , CBC:   Lab Results   Component Value Date    WBC 10 53 (H) 08/18/2018    HGB 9 1 (L) 08/18/2018    HCT 29 2 (L) 08/18/2018    MCV 77 (L) 08/18/2018     08/18/2018    MCH 24 1 (L) 08/18/2018    MCHC 31 2 (L) 08/18/2018    RDW 17 5 (H) 08/18/2018    MPV 10 1 08/18/2018   , CMP:   Lab Results   Component Value Date     08/18/2018    K 4 3 08/18/2018     08/18/2018    CO2 27 08/18/2018    ANIONGAP 9 08/18/2018    BUN 13 08/18/2018    CREATININE 0 78 08/18/2018    GLUCOSE 84 08/18/2018    CALCIUM 8 9 08/18/2018    EGFR 72 08/18/2018     VTE Pharmacologic Prophylaxis: Reason for no pharmacologic prophylaxis on Xarelto  VTE Mechanical Prophylaxis: sequential compression device

## 2018-08-19 NOTE — OCCUPATIONAL THERAPY NOTE
OT TREATMENT NOTE       08/19/18 1430   Restrictions/Precautions   Weight Bearing Precautions Per Order Yes   RLE Weight Bearing Per Order WBAT   LLE Weight Bearing Per Order WBAT   Other Precautions Fall Risk;Pain  (b/l surgical shoes)   Pain Assessment   Pain Assessment 0-10   Pain Score Worst Possible Pain   Pain Type Acute pain   Pain Location Foot   Pain Orientation Left   Hospital Pain Intervention(s) (RN administered medication during session)   Response to Interventions pt tolerated session    ADL   Where Assessed Edge of bed   Grooming Assistance 5  Supervision/Setup   Grooming Deficit Wash/dry face; Wash/dry hands;Brushing hair   Grooming Comments pt completed grooming while seated at EOB due to pain in b/l feet while in stance   UB Bathing Assistance 5  Supervision/Setup   UB Bathing Deficit Right arm;Left arm; Abdomen   LB Bathing Assistance 5  Supervision/Setup   LB Bathing Deficit Perineal area; Buttocks;Right upper leg;Left upper leg;Right lower leg including foot; Left lower leg including foot   LB Bathing Comments pt unable to wash b/l feet due to recommendation from MD  Pt able to wash LE's down to ankle while seated at EOB  Pt washed george and buttock while in stance at supervision level   UB Dressing Assistance 5  Supervision/Setup   UB Dressing Deficit Thread RUE; Thread LUE;Pull over head   LB Dressing Assistance 4  Minimal Assistance   LB Dressing Deficit Don/doff R sock; Don/doff L sock; Thread RLE into pants; Thread LLE into pants; Thread RLE into underwear; Thread LLE into underwear   LB Dressing Comments Pt with increased timet o don b/l socks while seated at EOB due to pain in b/l feet L>R Pt with epsiode of LOB backward while pulling up pants requiring min A to regain balance     Functional Standing Tolerance   Time 2 min intervals due to pain in b/l feet   Bed Mobility   Rolling R 5  Supervision   Rolling L 5  Supervision   Supine to Sit 5  Supervision   Transfers   Sit to Stand 5  Supervision Stand to Sit 5  Supervision   Stand pivot 5  Supervision   Additional Comments Pt completed all transfers at supervision level with use of RW  Functional Mobility   Functional Mobility 5  Supervision   Additional Comments Pt ablet o walk from far side of bed over to recliner at Close supervision level  Pt with good safety awareness and no LOB noted   Cognition   Overall Cognitive Status WFL   Arousal/Participation Alert; Cooperative   Attention Within functional limits   Orientation Level Oriented X4   Memory Within functional limits   Following Commands Follows multistep commands with increased time or repetition   Activity Tolerance   Activity Tolerance Patient tolerated treatment well   Assessment   Assessment Pt engaged in 30 minute OT treatment session with focus on ADL routine  Pt completed sponge bathing while seated at EOB  Pt sponge bathed at baseline due to inability to get feet wet as per MD  Pt had one epsiode of LOB during todays session while pulling pants up over hips  Pt attempting to hold onto RW as she was retropulsive  Pt educated on safety and forward weight shift to help with righting reactions  Pt would benefit from continued focus on dynamic balance with weight shift outside MEGAN  Pt able to complete LB dressing while crossing leg over opposing knee  Pt did have 10/10 pain in LLE requiring increased time to complete LB dressing however pt was able to engage in all tasks  Continue with POC pt is scheduled for angiogram for tomorrow as per RN  Dah   Plan   Treatment Interventions ADL retraining;Functional transfer training; Endurance training   Goal Expiration Date 08/24/18   Treatment Day 4   OT Frequency 3-5x/wk   Recommendation   OT Discharge Recommendation Short Term Rehab   Barthel Index   Feeding 10   Bathing 0   Grooming Score 5   Dressing Score 5   Bladder Score 10   Bowels Score 10   Toilet Use Score 5   Transfers (Bed/Chair) Score 10   Mobility (Level Surface) Score 0   Stairs Score 0   Barthel Index Score 55   Modified Broxton Scale   Modified Broxton Scale 3

## 2018-08-19 NOTE — PLAN OF CARE
Problem: OCCUPATIONAL THERAPY ADULT  Goal: Performs self-care activities at highest level of function for planned discharge setting  See evaluation for individualized goals  Treatment Interventions: ADL retraining, Functional transfer training, Endurance training, UE strengthening/ROM, Patient/family training, Equipment evaluation/education, Compensatory technique education, Energy conservation, Activityengagement          See flowsheet documentation for full assessment, interventions and recommendations  Outcome: Progressing  Limitation: Decreased ADL status, Decreased endurance, Decreased high-level ADLs, Decreased self-care trans  Prognosis: Good  Assessment: Pt engaged in 30 minute OT treatment session with focus on ADL routine  Pt completed sponge bathing while seated at EOB  Pt sponge bathed at baseline due to inability to get feet wet as per MD  Pt had one epsiode of LOB during todays session while pulling pants up over hips  Pt attempting to hold onto RW as she was retropulsive  Pt educated on safety and forward weight shift to help with righting reactions  Pt would benefit from continued focus on dynamic balance with weight shift outside MEGAN  Pt able to complete LB dressing while crossing leg over opposing knee  Pt did have 10/10 pain in LLE requiring increased time to complete LB dressing however pt was able to engage in all tasks   Continue with POC pt is scheduled for angiogram for tomorrow as per TOMY Mancia     OT Discharge Recommendation: Short Term Rehab  OT - OK to Discharge:  (when medically stable)

## 2018-08-19 NOTE — PROGRESS NOTES
IM Residency Progress Note   Unit/Bed#: -01 Encounter: [id number]  SOD Team C       Salvador Shah [de-identified] y o  female [id number]    Hospital Stay Days: 10      Assessment/Plan:    1  B/L Toe Wounds with 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 2/2 to severe PAD s/p IR RLE angioplasties of R distal SFA, mid R peroneal artery  - podiatry following and plan for either amputation vs BKA on the R pending LEADs  - ABIs pending, machinery reportedly broken at this time  - vascular is following and awaiting ABIs  - started cefazolin per ID  - will continue supportive care over the weekend and hopefully machinery will be repaired by Monday and patient can have study done to determine definitve treatment options  - continue pain control - oxycodone and tylenol     2  Combined Systolic and Diastolic Heart Failure   -ROBB on 0/4/1985 showed systolic function was moderately to markedly reduced  Ejection fraction was estimated to be 35 %   - appears euvolemic on exam  - continue Furosemide 40 mg daily, Lisinopril 5 mg     3  CAD - continue ASA 81 mg, Atorvastatin 80 mg, Metoprolol 12 5 mg     4  Paroxysmal Atrial Fibrillation - NSR  - rates well controlled  - Amiodarone 200 mg, lopressor  - Xarelto 20 mg daily      5  Type 2 DM  - Metformin and Lantus at home  - Hold Metformin  -  Lantus 22 units q12 hours + ISS     6  GERD - continue pantoprazole 20 mg      7  Microcytic Anemia - Hgb stable   -Ferrous sulfate every other day      8  Asthma - Albuterol prn  - Fluticasone 2 puffs q12 hours      9  Seizure Disorder - continue keppra 750 mg q12 hours      10  Hypothyroidism - levothyroxine 100 mcg daily     11  HTN - cont   amlodipine to 10mg daily, lisinopril, lopressor      - Preoperative evaluation completed by cardiology, consult 8/17    Principal Problem:    Cellulitis  Active Problems:    Essential hypertension    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 native heart without angina pectoris    Type 2 diabetes mellitus with complication, with long-term current use of insulin (HCC)    Atrial flutter (HCC)    Hyperlipidemia    Gastroesophageal reflux disease without esophagitis    Chronic combined systolic and diastolic congestive heart failure (HCC)    Paroxysmal atrial fibrillation (HCC)    Ulcer of toe of left foot (HCC)    Ulcer of toe of right foot ([facility] [address] )    History of CVA (cerebrovascular accident)    [facility]    Anemia    PAD (peripheral artery disease) ([facility])    CKD (chronic kidney disease) stage 3, GFR 30-59 ml/min        Disposition: per SOD       Subjective: Feels fine, no complaints       Vitals: Temp (24hrs), Av 2 °F (36 8 °C), Min:97 6 °F (36 4 °C), Max:98 6 °F (37 °C)  Current: Temperature: 98 5 °F (36 9 °C)  Vitals:    18 0733 18 1519 18 2300 18 0744   BP: 157/77 112/63 146/56 158/74   BP Location: Left arm Left arm Right arm Left arm   Pulse: 58 57 56 57   Resp:    Temp: 98 6 °F (37 °C) 98 6 °F (37 °C) 97 6 °F (36 4 °C) 98 5 °F (36 9 °C)   TempSrc: Oral Oral Oral Oral   SpO2: 98% 97% 96% 97%   Weight:       Height:        Body mass index is 27 65 kg/m²  I/O last 24 hours: In: 180 [P O :180]  Out: 3500 [Urine:3500]      Physical Exam:   Physical Exam   Constitutional: She appears well-developed and well-nourished  HENT:   Head: Normocephalic and atraumatic  Mouth/Throat: No oropharyngeal exudate  Eyes: Conjunctivae are normal  Pupils are equal, round, and reactive to light  Right eye exhibits no discharge  Left eye exhibits no discharge  No scleral icterus  Neck: Neck supple  Cardiovascular: Normal rate and regular rhythm  No murmur heard  Pulmonary/Chest: Effort normal and breath sounds normal  No respiratory distress  Abdominal: Soft  She exhibits no distension  There is no tenderness  Musculoskeletal: She exhibits no edema  Neurological: She is alert  Skin: Skin is dry  Psychiatric: She has a normal mood and affect         Invasive Devices     Peripheral Intravenous Line            Peripheral IV 08/18/18 Right Forearm less than 1 day                Labs:   Recent Results (from the past 24 hour(s))   Fingerstick Glucose (POCT)    Collection Time: 08/18/18 10:26 AM   Result Value Ref Range    POC Glucose 221 (H) 65 - 140 mg/dl   CBC    Collection Time: 08/18/18  2:09 PM   Result Value Ref Range    WBC 10 53 (H) 4 31 - 10 16 Thousand/uL    RBC 3 78 (L) 3 81 - 5 12 Million/uL    Hemoglobin 9 1 (L) 11 5 - 15 4 g/dL    Hematocrit 29 2 (L) 34 8 - 46 1 %    MCV 77 (L) 82 - 98 fL    MCH 24 1 (L) 26 8 - 34 3 pg    MCHC 31 2 (L) 31 4 - 37 4 g/dL    RDW 17 5 (H) 11 6 - 15 1 %    Platelets 557 329 - 531 Thousands/uL    MPV 10 1 8 9 - 12 7 fL   Fingerstick Glucose (POCT)    Collection Time: 08/18/18  4:01 PM   Result Value Ref Range    POC Glucose 156 (H) 65 - 140 mg/dl   Fingerstick Glucose (POCT)    Collection Time: 08/18/18  9:01 PM   Result Value Ref Range    POC Glucose 193 (H) 65 - 140 mg/dl   Fingerstick Glucose (POCT)    Collection Time: 08/19/18  6:24 AM   Result Value Ref Range    POC Glucose 63 (L) 65 - 140 mg/dl   Fingerstick Glucose (POCT)    Collection Time: 08/19/18  6:49 AM   Result Value Ref Range    POC Glucose 86 65 - 140 mg/dl       Radiology Results: I have personally reviewed pertinent reports  Other Diagnostic Testing:   I have personally reviewed pertinent reports          Active Meds:   Current Facility-Administered Medications   Medication Dose Route Frequency    acetaminophen (TYLENOL) tablet 975 mg  975 mg Oral Q8H [facility]    albuterol (PROVENTIL HFA,VENTOLIN HFA) inhaler 1 puff  1 puff Inhalation BID PRN    amiodarone tablet 200 mg  200 mg Oral Daily With Breakfast    amLODIPine (NORVASC) tablet 10 mg  10 mg Oral Daily    aspirin chewable tablet 81 mg  81 mg Oral Daily    atorvastatin (LIPITOR) tablet 80 mg  80 mg Oral Daily    calcium carbonate (TUMS) chewable tablet 1,000 mg  1,000 mg Oral TID    ceFAZolin (ANCEF) IVPB (premix) 1,000 mg  1,000 mg Intravenous Q8H    diphenhydrAMINE (BENADRYL) tablet 50 mg  50 mg Oral 60 Min Pre-Op    docusate sodium (COLACE) capsule 100 mg  100 mg Oral BID    ferrous sulfate tablet 325 mg  325 mg Oral Every Other Day    fluticasone (FLONASE) 50 mcg/act nasal spray 1 spray  1 spray Nasal Daily    fluticasone (FLOVENT HFA) 110 MCG/ACT inhaler 2 puff  2 puff Inhalation Q12H LANCE    furosemide (LASIX) injection 40 mg  40 mg Intravenous BID (diuretic)    gabapentin (NEURONTIN) capsule 100 mg  100 mg Oral HS    insulin glargine (LANTUS) subcutaneous injection 22 Units 0 22 mL  22 Units Subcutaneous Q12H [facility]    insulin lispro (HumaLOG) 100 units/mL subcutaneous injection 2-12 Units  2-12 Units Subcutaneous 4x Daily (AC & HS)    levETIRAcetam (KEPPRA) tablet 750 mg  750 mg Oral Q12H [facility]    levothyroxine tablet 100 mcg  100 mcg Oral Early Morning    lisinopril (ZESTRIL) tablet 5 mg  5 mg Oral Daily    loratadine (CLARITIN) tablet 10 mg  10 mg Oral Daily    magnesium oxide (MAG-OX) tablet 400 mg  400 mg Oral BID    metoclopramide (REGLAN) tablet 5 mg  5 mg Oral Q6H PRN    metoprolol tartrate (LOPRESSOR) partial tablet 12 5 mg  12 5 mg Oral Q12H LANCE    nitroglycerin (NITROSTAT) SL tablet 0 4 mg  0 4 mg Sublingual Q3 min PRN    oxyCODONE (ROXICODONE) IR tablet 5 mg  5 mg Oral Q4H PRN    pantoprazole (PROTONIX) EC tablet 20 mg  20 mg Oral Early Morning    polyethylene glycol (MIRALAX) packet 17 g  17 g Oral BID    rivaroxaban (XARELTO) tablet 15 mg  15 mg Oral Daily         VTE Pharmacologic Prophylaxis: Reason for no pharmacologic prophylaxis xarelto  VTE Mechanical Prophylaxis: sequential compression device    Iris Jasmine MD

## 2018-08-19 NOTE — PROGRESS NOTES
Cardiology Progress Note - Nash Guzman [de-identified] y o  female MRN: [id number]    Unit/Bed#: -01 Encounter: [id number]        Subjective:    No significant events overnight  No chest pain or dyspnea at rest      ROS    Objective:   Vitals: Blood pressure 158/74, pulse 57, temperature 98 5 °F (36 9 °C), temperature source Oral, resp  rate 18, height 5' 4" (1 626 m), weight 73 1 kg (161 lb 1 6 oz), SpO2 97 %, not currently breastfeeding , Body mass index is 27 65 kg/m² , Orthostatic Blood Pressures      Most Recent Value   Blood Pressure  158/74 filed at 08/19/2018 0744   Patient Position - Orthostatic VS  Lying filed at 08/19/2018 8166         Systolic (09FTD), AJO:772 , Min:112 , UFR:027     Diastolic (96KTP), NTS:45, Min:56, Max:74      Intake/Output Summary (Last 24 hours) at 08/19/18 5994  Last data filed at 08/19/18 0101   Gross per 24 hour   Intake              180 ml   Output             3500 ml   Net            -3320 ml     Weight (last 2 days)     None                Physical Exam   Constitutional: She is oriented to person, place, and time  No distress  HENT:   Mouth/Throat: No oropharyngeal exudate  Eyes: No scleral icterus  Neck: JVD present  Cardiovascular: Normal rate and regular rhythm  Pulmonary/Chest: Effort normal  She has rales  Abdominal: Soft  Bowel sounds are normal  She exhibits no distension  There is no tenderness  There is no rebound  Musculoskeletal: She exhibits edema  Neurological: She is alert and oriented to person, place, and time  Skin: Skin is warm and dry  She is not diaphoretic  Psychiatric: She has a normal mood and affect   Her behavior is normal          Laboratory Results:        CBC with diff:   Results from last 7 days  Lab Units 08/18/18  1409 08/16/18  0607 08/15/18  0540 08/14/18  0530 08/13/18  0531   WBC Thousand/uL 10 53* 12 12* 10 38* 10 27* 10 12   HEMOGLOBIN g/dL 9 1* 9 8* 9 3* 10 1* 10 0*   HEMATOCRIT % 29 2* 32 0* 31 1* 32 5* 32 9*   MCV fL 77* 78* 78* 78* 77*   PLATELETS Thousands/uL 229 244 220 232 239   MCH pg 24 1* 24 0* 23 4* 24 1* 23 4*   MCHC g/dL 31 2* 30 6* 29 9* 31 1* 30 4*   RDW % 17 5* 18 0* 17 9* 17 9* 18 0*   MPV fL 10 1 10 1 10 5 10 4 10 8   NRBC AUTO /100 WBCs  --  0  --  0 0         CMP:  Results from last 7 days  Lab Units 18  0545 18  0607 08/15/18  0540 18  0531   SODIUM mmol/L 138 139 138 137   POTASSIUM mmol/L 4 3 4 2 4 0 3 7   CHLORIDE mmol/L 102 105 105 104   CO2 mmol/L 27 29 27 27   ANION GAP mmol/L 9 5 6 6   BUN mg/dL 13 17 12 12   CREATININE mg/dL 0 78 0 76 0 67 0 68   GLUCOSE RANDOM mg/dL 84 66 75 81   CALCIUM mg/dL 8 9 8 5 8 1* 8 1*   EGFR ml/min/1 73sq m 72 74 83 83         BMP:  Results from last 7 days  Lab Units 18  0545 18  0607 08/15/18  0540 18  0531   SODIUM mmol/L 138 139 138 137   POTASSIUM mmol/L 4 3 4 2 4 0 3 7   CHLORIDE mmol/L 102 105 105 104   CO2 mmol/L 27 29 27 27   BUN mg/dL 13 17 12 12   CREATININE mg/dL 0 78 0 76 0 67 0 68   GLUCOSE RANDOM mg/dL 84 66 75 81   CALCIUM mg/dL 8 9 8 5 8 1* 8 1*       BNP: No results for input(s): BNP in the last 72 hours  Magnesium:       Coags:   Results from last 7 days  Lab Units 18  0545   PTT seconds 29   INR  1 37*       TSH: No results found for: TSH    Hemoglobin A1C       Lipid Profile:       Cardiac testing:   Results for orders placed during the hospital encounter of 18   Echo complete with contrast if indicated    Narrative [address]  [phone]    Transthoracic Echocardiogram  2D, M-mode, Doppler, and Color Doppler    Study date:  01-May-2018    Patient: Lino Jacinto  MR number: [id number]  Account number: [de-identified]  : 1940  Age: [de-identified] years  Gender: Female  Status: Inpatient  Location: Bedside  Height: 64 in  Weight: 161 7 lb  BP: 171/ 83 mmHg    Indications: Aortic Valve Disorder Evaluate prosthetic aortic valve   Evaluate prosthetic mitral valve  Diagnoses: I35 9 - Nonrheumatic aortic valve disorder, unspecified    Sonographer:  Fei Rockwell RDCS  Primary Physician:  Cynthia Zimmerman MD  Group:  [facility]  Interpreting Physician:  Lay Haywood MD    SUMMARY    LEFT VENTRICLE:  The ventricle was mildly dilated  Systolic function was moderately to markedly reduced  Ejection fraction was estimated to be 35 %  There was moderate diffuse hypokinesis with distinct regional wall motion abnormalities  More severe hypokinesis to akinesis was seen in the inferoseptal and inferior walls  Wall thickness was mildly increased  VENTRICULAR SEPTUM:  There was moderate dyssynergic motion  These changes are consistent with LBBB  RIGHT VENTRICLE:  The ventricle was moderately dilated  Systolic function was mildly reduced  LEFT ATRIUM:  The atrium was moderately dilated  RIGHT ATRIUM:  The atrium was moderately dilated  MITRAL VALVE:  There was marked annular calcification  There was moderate diffuse thickening  There was moderately reduced leaflet separation  Transmitral velocity was increased due to valvular stenosis  There was moderate stenosis  There was mild regurgitation  AORTIC VALVE:  A bioprosthesis was present  It exhibited normal function  There was no significant perivalvular regurgitation  Valve mean gradient was 8 mmHg  TRICUSPID VALVE:  There was moderate regurgitation  Pulmonary artery systolic pressure was markedly increased  Estimated peak PA pressure was 70 mmHg  IVC, HEPATIC VEINS:  The inferior vena cava was dilated  HISTORY: PRIOR HISTORY: TAVR, Mitral Stenosis, CABG, MI, HTN, Asthma, CHF, DM, CVA, CAD, HLD    PROCEDURE: The procedure was performed at the bedside  This was a routine study  The transthoracic approach was used  The study included complete 2D imaging, M-mode, complete spectral Doppler, and color Doppler   The heart rate was 100 bpm,  at the start of the study  Images were obtained from the parasternal, apical, subcostal, and suprasternal notch acoustic windows  Echocardiographic views were limited due to restricted patient mobility, poor patient compliance, and lung  interference  This was a technically difficult study  LEFT VENTRICLE: The ventricle was mildly dilated  Systolic function was moderately to markedly reduced  Ejection fraction was estimated to be 35 %  There was moderate diffuse hypokinesis with distinct regional wall motion abnormalities  More severe hypokinesis to akinesis was seen in the inferoseptal and inferior walls  Wall thickness was mildly increased  DOPPLER: The study was not technically sufficient to allow evaluation of LV diastolic function  VENTRICULAR SEPTUM: Thickness was mildly increased  There was moderate dyssynergic motion  These changes are consistent with LBBB  RIGHT VENTRICLE: The ventricle was moderately dilated  Systolic function was mildly reduced  Wall thickness was normal     LEFT ATRIUM: The atrium was moderately dilated  RIGHT ATRIUM: The atrium was moderately dilated  MITRAL VALVE: There was marked annular calcification  There was moderate diffuse thickening  There was moderately reduced leaflet separation  DOPPLER: Transmitral velocity was increased due to valvular stenosis  There was moderate  stenosis  There was mild regurgitation  AORTIC VALVE: A bioprosthesis was present  It exhibited normal function  DOPPLER: Transaortic velocity was within the normal range  There was no evidence for stenosis  There was no significant regurgitation  There was no significant  perivalvular regurgitation  TRICUSPID VALVE: The valve structure was normal  DOPPLER: The transtricuspid velocity was within the normal range  There was moderate regurgitation  Pulmonary artery systolic pressure was markedly increased  Estimated peak PA pressure was  70 mmHg  PULMONIC VALVE: Not well visualized   DOPPLER: The transpulmonic velocity was within the normal range  There was no regurgitation  PERICARDIUM: There was no pericardial effusion  AORTA: The root exhibited normal size  The ascending aorta was normal in size  SYSTEMIC VEINS: IVC: The inferior vena cava was dilated  PULMONARY VEINS: DOPPLER: Doppler signals were not recordable in the pulmonary vein(s)  MEASUREMENT TABLES    DOPPLER MEASUREMENTS  Aortic valve   (Reference normals)  Mean gradient   8 mmHg   (--)    SYSTEM MEASUREMENT TABLES    2D  %FS: 15 07 %  Ao Diam: 2 75 cm  EDV(Teich): 139 25 ml  EF(Teich): 31 62 %  ESV(Teich): 95 22 ml  IVSd: 1 2 cm  LA Area: 23 57 cm2  LA Diam: 4 16 cm  LVEDV MOD A4C: 93 35 ml  LVEF MOD A4C: 41 28 %  LVESV MOD A4C: 54 81 ml  LVIDd: 5 37 cm  LVIDs: 4 56 cm  LVLd A4C: 7 64 cm  LVLs A4C: 6 64 cm  LVOT Diam: 1 9 cm  LVPWd: 1 21 cm  RA Area: 25 62 cm2  RVIDd: 5 25 cm  SV MOD A4C: 38 53 ml  SV(Teich): 44 03 ml    CW  AV Env  Ti: 318 34 ms  AV VTI: 47 91 cm  AV Vmax: 2 36 m/s  AV Vmean: 1 5 m/s  AV maxP 29 mmHg  AV meanPG: 10 48 mmHg  TR Vmax: 3 84 m/s  TR maxP 13 mmHg    MM  TAPSE: 1 4 cm    PW  CHER (VTI): 1 05 cm2  CHER Vmax: 1 03 cm2  E': 0 03 m/s  E/E': 81 41  HR: 173 4 BPM  LVOT Env  Ti: 332 18 ms  LVOT VTI: 17 78 cm  LVOT Vmax: 0 86 m/s  LVOT Vmean: 0 54 m/s  LVOT maxP 95 mmHg  LVOT meanP 37 mmHg  LVSI Dopp: 28 05 ml/m2  LVSV Dopp: 50 2 ml  MV A Jayant: 2 32 m/s  MV Dec Mathews: 11 07 m/s2  MV DecT: 201 33 ms  MV E Jayant: 2 23 m/s  MV E/A Ratio: 0 96  MV PHT: 58 39 ms  MV VTI: 50 85 cm  MV Vmax: 2 29 m/s  MV Vmean: 1 49 m/s  MV maxP 89 mmHg  MV meanPG: 10 01 mmHg  MVA (VTI): 0 99 cm2  MVA By PHT: 3 77 cm2    [facility] Accredited [facility]ch[facility]    Prepared and electronically signed by    Pamela Souza MD  Signed [id number] 15:08:38       Results for orders placed during the hospital encounter of 18   ROBB    Narrative [address] [facility]  [phone]    Transesophageal Echocardiogram  2D, Doppler, and Color Doppler    Study date:  2018    Patient: Ayesha Alvarenga  MR number: [id number]  Account number: [de-identified]  : 1940  Age: [de-identified] years  Gender: Female  Status: Inpatient  Location: Echo lab  Height: 64 in  Weight: 167 lb  BP: 116/ 52 mmHg    Indications: CVA    Diagnoses: R55  - Syncope and collapse    Sonographer:  SIOBHAN Cardenas  Primary Physician:  Florin Rashid MD  Referring Physician:  Terrance William MD  Group:  [facility]  Interpreting Physician:  Sarah Beth Gonzalez MD    SUMMARY    LEFT VENTRICLE:  Systolic function was normal  Ejection fraction was estimated to be 55 %  Although no diagnostic regional wall motion abnormality was identified, this possibility cannot be completely excluded on the basis of this study  Wall thickness was increased  Concentric hypertrophy was present  LEFT ATRIAL APPENDAGE:  No thrombus was identified  ATRIAL SEPTUM:  The septum bows from left to right, consistent with increased left atrial pressure  No defect or patent foramen ovale was identified by color Doppler or after injection of agitated saline  MITRAL VALVE:  There was moderate annular calcification  There was mild to moderate stenosis  Vmax 1 7 m/s, mean gradient 5 6 mm Hg, maximum gradient 11 mm Hg at a HR of 78 BPM   There was mild regurgitation  AORTIC VALVE:  A bioprosthesis was present  It exhibited normal function  TRICUSPID VALVE:  There was moderate to severe regurgitation  Estimated peak PA pressure was 55 mmHg  The findings suggest moderate pulmonary hypertension  HISTORY: PRIOR HISTORY: TAVR, Mitral Stenosis , CABG, MI, Hypertension, Asthma, CHF    PROCEDURE: The procedure was performed in the echo lab  This was a routine study  The risks and alternatives of the procedure were explained to the patient and informed consent was obtained   The transesophageal approach was used  The study  included complete 2D imaging, limited spectral Doppler, and color Doppler  The heart rate was 74 bpm, at the start of the study  An adult omniplane probe was inserted by the attending cardiologist  Intubated with ease  One intubation  attempt(s)  There was no blood detected on the probe  Image quality was adequate  There were no complications during the procedure  MEDICATIONS: Anesthesia administered by anesthesia team     LEFT VENTRICLE: Size was normal  Systolic function was normal  Ejection fraction was estimated to be 55 %  Although no diagnostic regional wall motion abnormality was identified, this possibility cannot be completely excluded on the basis  of this study  Wall thickness was increased  Concentric hypertrophy was present  RIGHT VENTRICLE: The size was normal  Systolic function was normal     LEFT ATRIUM: The atrium was dilated  No thrombus was identified  APPENDAGE: The appendage was dilated  No thrombus was identified  DOPPLER: The function was reduced (reduced emptying velocity)  ATRIAL SEPTUM: The septum bows from left to right, consistent with increased left atrial pressure  No defect or patent foramen ovale was identified by color Doppler or after injection of agitated saline  MITRAL VALVE: There was moderate annular calcification  There was mild-moderate calcification  There was mildly reduced leaflet separation  DOPPLER: There was mild to moderate stenosis  Vmax 1 7 m/s, mean gradient 5 6 mm Hg, maximum  gradient 11 mm Hg at a HR of 78 BPM  There was mild regurgitation  AORTIC VALVE: A bioprosthesis was present  It exhibited normal function  DOPPLER: There was no evidence for stenosis  There was no significant regurgitation  TRICUSPID VALVE: The valve structure was normal  There was normal leaflet separation  There was no echocardiographic evidence of vegetation  DOPPLER: There was moderate to severe regurgitation   Estimated peak PA pressure was 55 mmHg  The  findings suggest moderate pulmonary hypertension  PULMONIC VALVE: Leaflets exhibited normal thickness, no calcification, and normal cuspal separation  There was no echocardiographic evidence of vegetation  PERICARDIUM: There was no pericardial effusion  The pericardium was normal in appearance  AORTA: The root exhibited normal size  There was no atheroma  There was no evidence for dissection  There was no evidence for aneurysm  [address] Accredited Echocardiography Laboratory    Prepared and electronically signed by    Luis Miguel Fenton MD  Signed 18-Apr-2018 14:48:57       No results found for this or any previous visit  No results found for this or any previous visit      Meds/Allergies   current meds:   Current Facility-Administered Medications   Medication Dose Route Frequency    acetaminophen (TYLENOL) tablet 975 mg  975 mg Oral Q8H [facility]    albuterol (PROVENTIL HFA,VENTOLIN HFA) inhaler 1 puff  1 puff Inhalation BID PRN    amiodarone tablet 200 mg  200 mg Oral Daily With Breakfast    amLODIPine (NORVASC) tablet 10 mg  10 mg Oral Daily    aspirin chewable tablet 81 mg  81 mg Oral Daily    atorvastatin (LIPITOR) tablet 80 mg  80 mg Oral Daily    calcium carbonate (TUMS) chewable tablet 1,000 mg  1,000 mg Oral TID    ceFAZolin (ANCEF) IVPB (premix) 1,000 mg  1,000 mg Intravenous Q8H    diphenhydrAMINE (BENADRYL) tablet 50 mg  50 mg Oral 60 Min Pre-Op    docusate sodium (COLACE) capsule 100 mg  100 mg Oral BID    ferrous sulfate tablet 325 mg  325 mg Oral Every Other Day    fluticasone (FLONASE) 50 mcg/act nasal spray 1 spray  1 spray Nasal Daily    fluticasone (FLOVENT HFA) 110 MCG/ACT inhaler 2 puff  2 puff Inhalation Q12H LANCE    furosemide (LASIX) injection 40 mg  40 mg Intravenous BID (diuretic)    gabapentin (NEURONTIN) capsule 100 mg  100 mg Oral HS    insulin glargine (LANTUS) subcutaneous injection 16 Units 0 16 mL  16 Units Subcutaneous HS    [START ON 8/20/2018] insulin glargine (LANTUS) subcutaneous injection 22 Units 0 22 mL  22 Units Subcutaneous QAM    insulin lispro (HumaLOG) 100 units/mL subcutaneous injection 2-12 Units  2-12 Units Subcutaneous 4x Daily (AC & HS)    levETIRAcetam (KEPPRA) tablet 750 mg  750 mg Oral Q12H [facility]    levothyroxine tablet 100 mcg  100 mcg Oral Early Morning    lisinopril (ZESTRIL) tablet 5 mg  5 mg Oral Daily    loratadine (CLARITIN) tablet 10 mg  10 mg Oral Daily    magnesium oxide (MAG-OX) tablet 400 mg  400 mg Oral BID    metoclopramide (REGLAN) tablet 5 mg  5 mg Oral Q6H PRN    metoprolol tartrate (LOPRESSOR) partial tablet 12 5 mg  12 5 mg Oral Q12H [facility]    nitroglycerin (NITROSTAT) SL tablet 0 4 mg  0 4 mg Sublingual Q3 min PRN    oxyCODONE (ROXICODONE) IR tablet 5 mg  5 mg Oral Q4H PRN    pantoprazole (PROTONIX) EC tablet 20 mg  20 mg Oral Early Morning    polyethylene glycol (MIRALAX) packet 17 g  17 g Oral BID     Prescriptions Prior to Admission   Medication    amiodarone 200 mg tablet    amLODIPine (NORVASC) 5 mg tablet    aspirin 81 MG tablet    atorvastatin (LIPITOR) 80 mg tablet    Cholecalciferol (VITAMIN D3) 1000 units CHEW    esomeprazole (NEXIUM) 20 mg capsule    ferrous sulfate 325 (65 Fe) mg tablet    fluticasone (FLONASE) 50 mcg/act nasal spray    furosemide (LASIX) 40 mg tablet    glucose blood test strip    Insulin Syringe-Needle U-100 (BD INSULIN SYRINGE ULTRAFINE) 31G X 5/16" 1 ML MISC    LANTUS 100 UNIT/ML subcutaneous injection    levETIRAcetam (KEPPRA) 750 mg tablet    levothyroxine 100 mcg tablet    lisinopril (ZESTRIL) 5 mg tablet    loratadine (CLARITIN) 10 mg tablet    magnesium oxide (MAG-OX) 400 mg    metFORMIN (GLUCOPHAGE) 1000 MG tablet    metoclopramide (REGLAN) 10 mg tablet    Mometasone Furoate (ASMANEX HFA) 100 MCG/ACT AERO    pantoprazole (PROTONIX) 40 mg tablet    potassium chloride (K-DUR,KLOR-CON) 10 mEq tablet    rivaroxaban (XARELTO) 20 mg tablet    acetaminophen (TYLENOL) 650 mg CR tablet    albuterol (VENTOLIN HFA) 90 mcg/act inhaler    calcium carbonate (TUMS) 500 mg chewable tablet    cholecalciferol (VITAMIN D3) 1,000 units tablet    diphenhydrAMINE (BENADRYL) 50 mg capsule    methylPREDNISolone (MEDROL) 32 MG tablet    nitroglycerin (NITROSTAT) 0 4 mg SL tablet    phenazopyridine (PYRIDIUM) 200 mg tablet    polyethylene glycol (MIRALAX) 17 g packet          Assessment:  Principal Problem:    Cellulitis  Active Problems:    Essential hypertension    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 native heart without angina pectoris    Type 2 diabetes mellitus with complication, with long-term current use of insulin ([facility])    Atrial flutter (HCC)    Hyperlipidemia    Gastroesophageal reflux disease without esophagitis    Chronic combined systolic and diastolic congestive heart failure ([facility])    Paroxysmal atrial fibrillation ([facility])    Ulcer of toe of left foot (HCC)    Ulcer of toe of right foot (HCC)    History of CVA (cerebrovascular accident)    Seizure ([facility] [address] )    Anemia    PAD (peripheral artery disease) ([facility])    CKD (chronic kidney disease) stage 3, GFR 30-59 ml/min      Plan:    Critical limb ischemia/CAD/Decompensated Systolic Heart Failure/s/p TAVR/Severe pulmonary HTN  Labs today are pending  Continue to diurese as tolerated  May be ready for oral diuretics by tomorrow  Counseling / Coordination of Care  Total floor / unit time spent today 25 minutes  Greater than 50% of total time was spent with the patient and / or family counseling and / or coordination of care  A description of the counseling / coordination of care

## 2018-08-19 NOTE — PROGRESS NOTES
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, right medial 1st met head  Left medial hallux, left 2nd digit  2  PAD  3  CKD, stage 3    Plan:  -  No plan for surgical treatment of right foot per vasc recs/poor perfusion  Will await left leg vascular intervention and assess further treatment  - Wounds appear stable and not infection  Will continue to monitor and provide [address] b/l at this time  WBAT sx shoe b/l  - c/w rest of care per SLIM, ID, vasc    Subjective/Objective   Chief Complaint:   Chief Complaint   Patient presents with    Foot Pain     pt comes from PCP with bilatal foot pain, venous ulcers present on both feet       Subjective: [de-identified] y o  y/o female was seen and evaluated at bedside in NAD  Aware of treatment plan  States that she feels fine  Blood pressure 158/74, pulse 57, temperature 98 5 °F (36 9 °C), temperature source Oral, resp  rate 18, height 5' 4" (1 626 m), weight 73 1 kg (161 lb 1 6 oz), SpO2 97 %, not currently breastfeeding  ,Body mass index is 27 65 kg/m²  Invasive Devices     Peripheral Intravenous Line            Peripheral IV 08/18/18 Right Forearm less than 1 day                Physical Exam:   General: Alert, cooperative and no distress  Lungs: Non labored breathing  Heart: Positive S1, S2  Abdomen: Soft, non-tender  Extremity: NVS & MSK function baseline  Right foot: ulcer medial right 1st met head with fibrotic-necrotic base, no malodor, no signs of infection, does not probe to bone, muscle, tendon  3rd digit dry stable gangrene with exposed distal phalanx  Left foot: medial 1st met head and left medial hallux with dry adhered eschar  2nd digit distal dry gangrene  No signs of active infection  Left foot edematous and erythematous with pain            Lab, Imaging and other studies:   CBC:   Lab Results   Component Value Date    WBC 10 53 (H) 08/18/2018    HGB 9 1 (L) 08/18/2018    HCT 29 2 (L) 08/18/2018    MCV 77 (L) 08/18/2018     08/18/2018    MCH 24 1 (L) 08/18/2018    MCHC 31 2 (L) 08/18/2018    RDW 17 5 (H) 08/18/2018    MPV 10 1 08/18/2018       Imaging: I have personally reviewed pertinent films in PACS  EKG, Pathology, and Other Studies: I have personally reviewed pertinent reports

## 2018-08-19 NOTE — PHYSICAL THERAPY NOTE
PHYSICAL THERAPY NOTE          Patient Name: Abelino Dc Date: 8/19/2018 08/19/18 1029   Pain Assessment   Pain Score 9   Pain Type Acute pain   Pain Location Foot   Pain Orientation Bilateral   Hospital Pain Intervention(s) Repositioned   Response to Interventions pt assisted to recliner end of session with legs elevated   Restrictions/Precautions   RLE Weight Bearing Per Order WBAT   LLE Weight Bearing Per Order WBAT   Braces or Orthoses (bilat surgical shoes)   Other Precautions Fall Risk;Pain   Cognition   Arousal/Participation Cooperative; Alert   Subjective   Subjective pt reported needing to use the bathroom and willing to participate in therapy    Bed Mobility   Rolling R 5  Supervision   Rolling L 5  Supervision   Supine to Sit 5  Supervision   Transfers   Sit to Stand 5  Supervision   Stand to Sit 5  Supervision   Stand pivot 5  Supervision   Toilet transfer 5  Supervision   Additional items Raised toilet seat   Ambulation/Elevation   Gait pattern Improper Weight shift; Forward Flexion; Excessively slow   Gait Assistance 5  Supervision   Additional items Assist x 1   Assistive Device Rolling walker   Distance 20  (x2)   Balance   Static Sitting Good   Dynamic Sitting Fair +   Static Standing Fair   Dynamic Standing [address] +  (with RW )   Assessment   Prognosis Good   Problem List Decreased strength;Decreased endurance; Impaired balance;Decreased mobility;Pain;Orthopedic restrictions   Assessment Pt demonstrates ability to perform transfers and functional mobility at S level with use of RW and bilat surgical shoes, however bilat foot pain is a limiting factor in progressing functional mobility  Pt is motivated to make progress   Pt will cont to benefit from skilled PT to further progress activity tolerance, BLE strength and I with mobility to progress ambulation distance and abiliity to perform aileen  Currently recommending inpt rehab vs home PT with family support pending progress and medical status  Goals   Patient Goals " to get my legs stronger to do more on my own"   Plan   Treatment/Interventions Functional transfer training;LE strengthening/ROM; Therapeutic exercise; Endurance training;Patient/family training;Equipment eval/education; Bed mobility;Gait training   Progress Progressing toward goals   PT Frequency 5x/wk   Recommendation   Recommendation Post acute IP rehab;Home PT; Home with family support  (pending progress)   Equipment Recommended Estefani Sen

## 2018-08-19 NOTE — PROGRESS NOTES
Progress Note - Infectious Disease   Sera Yun [de-identified] y o  female MRN: [id number]  Unit/Bed#: -01 Encounter: [id number]      Impression/Plan:  1   Chronic bilateral foot ischemic ulcers with possible left foot cellulitis  Now with possible progressive erythema, swelling, and pain involving left forefoot  Modestly improved since yesterday since starting the cefazolin  Consider chronic osteomyelitis of right 3rd toe  Positive wound cultures likely represent wound colonizers  Clinically stable without sepsis  Vascular and Podiatry concerned that this is a progressive infection  Resolution of the mild calf erythema, and perhaps slight improvement in the foot erythema  Rec:  ? Continue cefazolin for now  ? Await cardiac evaluation determine recommendations as far as revascularization  ? Continue [address] per podiatry  ? Await final surgical plan from vascular surgery and podiatry  ? Follow temperatures closely  ? Supportive care as per the primary service     2   PAD  Status PTA  Rec:  ? Close vascular surgery follow-up  ? Cardiac evaluation pending and will help dictate revascularization recommendations    Antibiotics:  Cefazolin 3    Subjective:  Patient has no fever, chills, sweats; no nausea, vomiting, diarrhea; no cough, shortness of breath; still with left foot pain  No new symptoms  Objective:  Vitals:  HR:  [56-57] 57  Resp:  [16-18] 18  BP: (112-158)/(56-74) 158/74  SpO2:  [96 %-97 %] 97 %  Temp (24hrs), Av 2 °F (36 8 °C), Min:97 6 °F (36 4 °C), Max:98 6 °F (37 °C)  Current: Temperature: 98 5 °F (36 9 °C)    Physical Exam:   General Appearance:  Alert, interactive, nontoxic, no acute distress  Throat: Oropharynx moist without lesions  Lungs:   Clear to auscultation bilaterally; no wheezes, rhonchi or rales; respirations unlabored   Heart:  RRR; no murmur, rub or gallop   Abdomen:   Soft, non-tender, non-distended, positive bowel sounds  Extremities: No clubbing, cyanosis    Left calf with resolved erythema, slightly decreased erythema and swelling involving the left foot   Skin: No new rashes or lesions  No draining wounds noted         Labs, Imaging, & Other studies:   All pertinent labs and imaging studies were personally reviewed    Results from last 7 days  Lab Units 08/18/18  1409 08/16/18  0607 08/15/18  0540   WBC Thousand/uL 10 53* 12 12* 10 38*   HEMOGLOBIN g/dL 9 1* 9 8* 9 3*   PLATELETS Thousands/uL 229 244 220       Results from last 7 days  Lab Units 08/18/18  0545 08/16/18  0607 08/15/18  0540   SODIUM mmol/L 138 139 138   POTASSIUM mmol/L 4 3 4 2 4 0   CHLORIDE mmol/L 102 105 105   CO2 mmol/L 27 29 27   ANION GAP mmol/L 9 5 6   BUN mg/dL 13 17 12   CREATININE mg/dL 0 78 0 76 0 67   EGFR ml/min/1 73sq m 72 74 83   GLUCOSE RANDOM mg/dL 84 66 75   CALCIUM mg/dL 8 9 8 5 8 1*       Results from last 7 days  Lab Units 08/12/18  1225   GRAM STAIN RESULT  No polys seen  1+ Gram positive cocci in clusters  No polys seen  3+ Gram positive cocci in clusters   WOUND CULTURE  2+ Growth of Klebsiella oxytoca*  2+ Growth of Staphylococcus aureus*  2+ Growth of Citrobacter amalonaticus complex*  2+ Growth of Staphylococcus aureus*

## 2018-08-19 NOTE — PLAN OF CARE
Problem: PHYSICAL THERAPY ADULT  Goal: Performs mobility at highest level of function for planned discharge setting  See evaluation for individualized goals  Treatment/Interventions: Functional transfer training, LE strengthening/ROM, Therapeutic exercise, Endurance training, Patient/family training, Equipment eval/education, Bed mobility, Gait training, Spoke to nursing, Spoke to case management  Equipment Recommended: Timoteo Fischer       See flowsheet documentation for full assessment, interventions and recommendations  Outcome: Progressing  Prognosis: Good  Problem List: Decreased strength, Decreased endurance, Impaired balance, Decreased mobility, Pain, Orthopedic restrictions  Assessment: Pt demonstrates ability to perform transfers and functional mobility at S level with use of RW and bilat surgical shoes, however bilat foot pain is a limiting factor in progressing functional mobility  Pt is motivated to make progress  Pt will cont to benefit from skilled PT to further progress activity tolerance, BLE strength and I with mobility to progress ambulation distance and abiliity to perform stairs  Currently recommending inpt rehab vs home PT with family support pending progress and medical status  Barriers to Discharge: Inaccessible home environment, Decreased caregiver support  Barriers to Discharge Comments: Lives alone   Recommendation: Post acute IP rehab, Home PT, Home with family support (pending progress)          See flowsheet documentation for full assessment

## 2018-08-20 LAB
GLUCOSE SERPL-MCNC: 140 MG/DL (ref 65–140)
GLUCOSE SERPL-MCNC: 167 MG/DL (ref 65–140)
GLUCOSE SERPL-MCNC: 224 MG/DL (ref 65–140)
GLUCOSE SERPL-MCNC: 70 MG/DL (ref 65–140)
INR PPP: 1.18 (ref 0.86–1.17)
PROTHROMBIN TIME: 15.1 SECONDS (ref 11.8–14.2)

## 2018-08-20 PROCEDURE — 99233 SBSQ HOSP IP/OBS HIGH 50: CPT | Performed by: SURGERY

## 2018-08-20 PROCEDURE — 99233 SBSQ HOSP IP/OBS HIGH 50: CPT | Performed by: INTERNAL MEDICINE

## 2018-08-20 PROCEDURE — 82948 REAGENT STRIP/BLOOD GLUCOSE: CPT

## 2018-08-20 PROCEDURE — 85610 PROTHROMBIN TIME: CPT | Performed by: STUDENT IN AN ORGANIZED HEALTH CARE EDUCATION/TRAINING PROGRAM

## 2018-08-20 PROCEDURE — 99232 SBSQ HOSP IP/OBS MODERATE 35: CPT | Performed by: INTERNAL MEDICINE

## 2018-08-20 RX ORDER — METHYLPREDNISOLONE 16 MG/1
32 TABLET ORAL
Status: DISCONTINUED | OUTPATIENT
Start: 2018-08-20 | End: 2018-08-20

## 2018-08-20 RX ORDER — DIPHENHYDRAMINE HCL 25 MG
50 TABLET ORAL
Status: DISCONTINUED | OUTPATIENT
Start: 2018-08-21 | End: 2018-08-20 | Stop reason: SDUPTHER

## 2018-08-20 RX ORDER — GABAPENTIN 100 MG/1
100 CAPSULE ORAL 2 TIMES DAILY
Status: DISCONTINUED | OUTPATIENT
Start: 2018-08-20 | End: 2018-08-24

## 2018-08-20 RX ORDER — METHYLPREDNISOLONE 16 MG/1
32 TABLET ORAL
Status: COMPLETED | OUTPATIENT
Start: 2018-08-20 | End: 2018-08-21

## 2018-08-20 RX ORDER — DIPHENHYDRAMINE HCL 25 MG
50 TABLET ORAL
Status: DISCONTINUED | OUTPATIENT
Start: 2018-08-20 | End: 2018-08-20 | Stop reason: SDUPTHER

## 2018-08-20 RX ORDER — DIPHENHYDRAMINE HCL 25 MG
50 TABLET ORAL
Status: COMPLETED | OUTPATIENT
Start: 2018-08-21 | End: 2018-08-21

## 2018-08-20 RX ORDER — DIPHENHYDRAMINE HCL 25 MG
50 TABLET ORAL
Status: DISCONTINUED | OUTPATIENT
Start: 2018-08-21 | End: 2018-08-20

## 2018-08-20 RX ORDER — METHYLPREDNISOLONE 16 MG/1
32 TABLET ORAL
Status: DISCONTINUED | OUTPATIENT
Start: 2018-08-21 | End: 2018-08-20 | Stop reason: SDUPTHER

## 2018-08-20 RX ADMIN — OXYCODONE HYDROCHLORIDE 5 MG: 5 TABLET ORAL at 02:12

## 2018-08-20 RX ADMIN — OXYCODONE HYDROCHLORIDE 5 MG: 5 TABLET ORAL at 21:33

## 2018-08-20 RX ADMIN — INSULIN GLARGINE 16 UNITS: 100 INJECTION, SOLUTION SUBCUTANEOUS at 21:33

## 2018-08-20 RX ADMIN — ASPIRIN 81 MG 81 MG: 81 TABLET ORAL at 07:48

## 2018-08-20 RX ADMIN — INSULIN LISPRO 2 UNITS: 100 INJECTION, SOLUTION INTRAVENOUS; SUBCUTANEOUS at 21:36

## 2018-08-20 RX ADMIN — OXYCODONE HYDROCHLORIDE 5 MG: 5 TABLET ORAL at 07:45

## 2018-08-20 RX ADMIN — FLUTICASONE PROPIONATE 2 PUFF: 110 AEROSOL, METERED RESPIRATORY (INHALATION) at 21:36

## 2018-08-20 RX ADMIN — INSULIN LISPRO 4 UNITS: 100 INJECTION, SOLUTION INTRAVENOUS; SUBCUTANEOUS at 12:33

## 2018-08-20 RX ADMIN — LORATADINE 10 MG: 10 TABLET ORAL at 07:48

## 2018-08-20 RX ADMIN — FUROSEMIDE 40 MG: 10 INJECTION, SOLUTION INTRAMUSCULAR; INTRAVENOUS at 17:14

## 2018-08-20 RX ADMIN — METOPROLOL TARTRATE 12.5 MG: 25 TABLET ORAL at 21:33

## 2018-08-20 RX ADMIN — DOCUSATE SODIUM 100 MG: 100 CAPSULE, LIQUID FILLED ORAL at 07:49

## 2018-08-20 RX ADMIN — CEFAZOLIN SODIUM 1000 MG: 1 SOLUTION INTRAVENOUS at 07:55

## 2018-08-20 RX ADMIN — POLYETHYLENE GLYCOL 3350 17 G: 17 POWDER, FOR SOLUTION ORAL at 07:49

## 2018-08-20 RX ADMIN — FLUTICASONE PROPIONATE 1 SPRAY: 50 SPRAY, METERED NASAL at 07:52

## 2018-08-20 RX ADMIN — OXYCODONE HYDROCHLORIDE 5 MG: 5 TABLET ORAL at 12:39

## 2018-08-20 RX ADMIN — ATORVASTATIN CALCIUM 80 MG: 80 TABLET, FILM COATED ORAL at 07:48

## 2018-08-20 RX ADMIN — ACETAMINOPHEN 975 MG: 325 TABLET, FILM COATED ORAL at 12:41

## 2018-08-20 RX ADMIN — Medication 400 MG: at 17:14

## 2018-08-20 RX ADMIN — METOPROLOL TARTRATE 12.5 MG: 25 TABLET ORAL at 07:47

## 2018-08-20 RX ADMIN — CEFAZOLIN SODIUM 1000 MG: 1 SOLUTION INTRAVENOUS at 17:15

## 2018-08-20 RX ADMIN — DOCUSATE SODIUM 100 MG: 100 CAPSULE, LIQUID FILLED ORAL at 17:14

## 2018-08-20 RX ADMIN — PANTOPRAZOLE SODIUM 20 MG: 20 TABLET, DELAYED RELEASE ORAL at 05:22

## 2018-08-20 RX ADMIN — METHYLPREDNISOLONE 32 MG: 16 TABLET ORAL at 21:36

## 2018-08-20 RX ADMIN — ACETAMINOPHEN 975 MG: 325 TABLET, FILM COATED ORAL at 21:33

## 2018-08-20 RX ADMIN — AMLODIPINE BESYLATE 10 MG: 10 TABLET ORAL at 07:46

## 2018-08-20 RX ADMIN — FERROUS SULFATE TAB 325 MG (65 MG ELEMENTAL FE) 325 MG: 325 (65 FE) TAB at 08:00

## 2018-08-20 RX ADMIN — FUROSEMIDE 40 MG: 10 INJECTION, SOLUTION INTRAMUSCULAR; INTRAVENOUS at 07:50

## 2018-08-20 RX ADMIN — Medication 400 MG: at 07:48

## 2018-08-20 RX ADMIN — LEVOTHYROXINE SODIUM 100 MCG: 100 TABLET ORAL at 05:22

## 2018-08-20 RX ADMIN — ACETAMINOPHEN 975 MG: 325 TABLET, FILM COATED ORAL at 05:22

## 2018-08-20 RX ADMIN — FLUTICASONE PROPIONATE 2 PUFF: 110 AEROSOL, METERED RESPIRATORY (INHALATION) at 07:52

## 2018-08-20 RX ADMIN — INSULIN GLARGINE 22 UNITS: 100 INJECTION, SOLUTION SUBCUTANEOUS at 09:14

## 2018-08-20 RX ADMIN — LEVETIRACETAM 750 MG: 750 TABLET ORAL at 21:33

## 2018-08-20 RX ADMIN — LEVETIRACETAM 750 MG: 750 TABLET ORAL at 07:48

## 2018-08-20 RX ADMIN — AMIODARONE HYDROCHLORIDE 200 MG: 200 TABLET ORAL at 07:46

## 2018-08-20 RX ADMIN — GABAPENTIN 100 MG: 100 CAPSULE ORAL at 17:14

## 2018-08-20 NOTE — PROGRESS NOTES
IM Residency Progress Note   Unit/Bed#: -01 Encounter: [id number]  SOD Team C       Edward Mcbride [de-identified] y o  female [id number]    Hospital Stay Days: 11      Assessment/Plan:    Principal Problem:    Cellulitis  Active Problems:    Essential hypertension    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 native heart without angina pectoris    Type 2 diabetes mellitus with complication, with long-term current use of insulin ([facility])    Atrial flutter ([facility])    Hyperlipidemia    Gastroesophageal reflux disease without esophagitis    Chronic combined systolic and diastolic congestive heart failure ([facility])    Paroxysmal atrial fibrillation ([facility])    Ulcer of toe of left foot ([facility])    Ulcer of toe of right foot ([facility])    History of CVA (cerebrovascular accident)    Seizure ([facility] [address] )    Anemia    PAD (peripheral artery disease) ([facility])    CKD (chronic kidney disease) stage 3, GFR 30-59 ml/min    1  B/L Toe Wounds with 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 2/2 to severe PAD s/p IR RLE angioplasties of R distal SFA, mid R peroneal artery  - podiatry-local wound care to bilateral feet, no surgical plan for right foot at present  Vascular intervention to left lower extremity   - vascular-plan for angiogram, receiving contrast allergy prep today with steroids and Benadryl  - continue cefazolin per ID  - continue pain control - oxycodone and tylenol per acute Pain service  - preoperative cardiac risk assessment completed by Cardiology  - NPO at midnight     2  Combined Systolic and Diastolic Heart Failure   - TAVR placed 4/18  -ROBB on 9/3/7980 showed systolic function was moderately to markedly reduced  Ejection fraction was estimated to be 35 % with severe pulmonary hypertension  - appears euvolemic on exam today  - continue IV diuresis per Cardiology with plan to likely transition to home oral diuretic regimen  - continue Lisinopril 5 mg daily     3   CAD status post CABG   - continue ASA 81 mg, Atorvastatin 80 mg, Metoprolol 12 5 mg     4  Paroxysmal Atrial Fibrillation - NSR  - rates well controlled  - Amiodarone 200 mg, lopressor  - Xarelto 20 mg daily on hold for procedure     5  Type 2 DM  - Metformin and Lantus at home  - Hold Metformin  - Lantus 22 units q a m  and 16 units q h s  prior to procedure  -resume Lantus 22 units b i d  following procedure tomorrow    6  GERD   - continue pantoprazole 20 mg      7  Microcytic Anemia - Hgb stable   -Ferrous sulfate every other day      8  Asthma   - Albuterol prn  - Fluticasone 2 puffs q12 hours      9  Seizure Disorder   - continue keppra 750 mg q12 hours      10  Hypothyroidism   - levothyroxine 100 mcg daily     11  HTN   - cont  amlodipine to 10mg daily, lisinopril, lopressor    Disposition:  Continue to monitor inpatient, dispo per SOD, podiatry      Subjective:   Patient seen examined at bedside  Patient notes that she is in pain most in her left foot  She is unable to have a higher procedure today as she needs prep for contrast allergy  Prep began today with plan for IR procedure tomorrow  Vitals: Temp (24hrs), Av 2 °F (36 8 °C), Min:98 °F (36 7 °C), Max:98 6 °F (37 °C)  Current: Temperature: 98 1 °F (36 7 °C)  Vitals:    18 1512 18 2103 18 0057 18 0716   BP: 139/58 136/62 125/73 162/69   BP Location: Left arm Left arm  Left arm   Pulse: 58 58 (!) 53 59   Resp: 18  20 20   Temp: 98 6 °F (37 °C)  98 °F (36 7 °C) 98 1 °F (36 7 °C)   TempSrc: Oral  Oral Oral   SpO2: 96%  94%    Weight:       Height:        Body mass index is 27 65 kg/m²  I/O last 24 hours: In: 9641 [P O :620; I V :525; IV Piggyback:50]  Out: 0 [Urine:3300]      Physical Exam:   Physical Exam   Constitutional: She is oriented to person, place, and time  She appears well-developed and well-nourished  No distress  HENT:   Head: Normocephalic and atraumatic  Mouth/Throat: No oropharyngeal exudate     Eyes: Conjunctivae and EOM are normal  Pupils are equal, round, and reactive to light  No scleral icterus  Cardiovascular: Normal rate and regular rhythm  No murmur heard  Pulmonary/Chest: Effort normal and breath sounds normal  No respiratory distress  She has no wheezes  She has no rales  Abdominal: Soft  Bowel sounds are normal  She exhibits no distension  There is no tenderness  There is no guarding  Musculoskeletal: She exhibits no edema, tenderness or deformity  Neurological: She is alert and oriented to person, place, and time  Skin: Skin is warm and dry  She is not diaphoretic  Erythematous left lower foot with ulcerations on toes   Psychiatric: She has a normal mood and affect   Her behavior is normal  Judgment and thought content normal        Invasive Devices     Peripheral Intravenous Line            Peripheral IV 08/18/18 Right Forearm 1 day                          Labs: Labs personally reviewed    Radiology Results: Imaging personally reviewed    Active Meds:   Current Facility-Administered Medications   Medication Dose Route Frequency    acetaminophen (TYLENOL) tablet 975 mg  975 mg Oral Q8H [facility]    albuterol (PROVENTIL HFA,VENTOLIN HFA) inhaler 1 puff  1 puff Inhalation BID PRN    amiodarone tablet 200 mg  200 mg Oral Daily With Breakfast    amLODIPine (NORVASC) tablet 10 mg  10 mg Oral Daily    aspirin chewable tablet 81 mg  81 mg Oral Daily    atorvastatin (LIPITOR) tablet 80 mg  80 mg Oral Daily    calcium carbonate (TUMS) chewable tablet 1,000 mg  1,000 mg Oral TID    ceFAZolin (ANCEF) IVPB (premix) 1,000 mg  1,000 mg Intravenous Q8H    diphenhydrAMINE (BENADRYL) tablet 50 mg  50 mg Oral 60 Min Pre-Op    diphenhydrAMINE (BENADRYL) tablet 50 mg  50 mg Oral 60 Min Pre-Op    docusate sodium (COLACE) capsule 100 mg  100 mg Oral BID    ferrous sulfate tablet 325 mg  325 mg Oral Every Other Day    fluticasone (FLONASE) 50 mcg/act nasal spray 1 spray  1 spray Nasal Daily    fluticasone (FLOVENT HFA) 110 MCG/ACT inhaler 2 puff  2 puff Inhalation Q12H [address]    furosemide (LASIX) injection 40 mg  40 mg Intravenous BID (diuretic)    gabapentin (NEURONTIN) capsule 100 mg  100 mg Oral HS    insulin glargine (LANTUS) subcutaneous injection 16 Units 0 16 mL  16 Units Subcutaneous HS    insulin glargine (LANTUS) subcutaneous injection 22 Units 0 22 mL  22 Units Subcutaneous QAM    insulin lispro (HumaLOG) 100 units/mL subcutaneous injection 2-12 Units  2-12 Units Subcutaneous 4x Daily (AC & HS)    levETIRAcetam (KEPPRA) tablet 750 mg  750 mg Oral Q12H [address]    levothyroxine tablet 100 mcg  100 mcg Oral Early Morning    loratadine (CLARITIN) tablet 10 mg  10 mg Oral Daily    magnesium oxide (MAG-OX) tablet 400 mg  400 mg Oral BID    methylPREDNISolone (MEDROL) tablet 32 mg  32 mg Oral Q10H    metoclopramide (REGLAN) tablet 5 mg  5 mg Oral Q6H PRN    metoprolol tartrate (LOPRESSOR) partial tablet 12 5 mg  12 5 mg Oral Q12H [address]    nitroglycerin (NITROSTAT) SL tablet 0 4 mg  0 4 mg Sublingual Q3 min PRN    oxyCODONE (ROXICODONE) IR tablet 5 mg  5 mg Oral Q4H PRN    pantoprazole (PROTONIX) EC tablet 20 mg  20 mg Oral Early Morning    polyethylene glycol (MIRALAX) packet 17 g  17 g Oral BID         VTE Pharmacologic Prophylaxis: Reason for no pharmacologic prophylaxis pre procedure   VTE Mechanical Prophylaxis: Bilateral SCDs    Bernard Garcia DO

## 2018-08-20 NOTE — PROGRESS NOTES
Pt denies ques regarding a gram for tomorrow  Pt NPO after MN x for meds  IVF initiated    Pt verbalizes understanding

## 2018-08-20 NOTE — PLAN OF CARE
Problem: Potential for Falls  Goal: Patient will remain free of falls  INTERVENTIONS:  - Assess patient frequently for physical needs  -  Identify cognitive and physical deficits and behaviors that affect risk of falls  -  [place] fall precautions as indicated by assessment   - Educate patient/family on patient safety including physical limitations  - Instruct patient to call for assistance with activity based on assessment  - Modify environment to reduce risk of injury  - Consider OT/PT consult to assist with strengthening/mobility   Outcome: Progressing      Problem: Prexisting or High Potential for Compromised Skin Integrity  Goal: Skin integrity is maintained or improved  INTERVENTIONS:  - Identify patients at risk for skin breakdown  - Assess and monitor skin integrity  - Assess and monitor nutrition and hydration status  - Monitor labs (i e  albumin)  - Assess for incontinence   - Turn and reposition patient  - Assist with mobility/ambulation  - Relieve pressure over bony prominences  - Avoid friction and shearing  - Provide appropriate hygiene as needed including keeping skin clean and dry  - Evaluate need for skin moisturizer/barrier cream  - Collaborate with interdisciplinary team (i e  Nutrition, Rehabilitation, etc )   - Patient/family teaching   Outcome: Progressing      Problem: Nutrition/Hydration-ADULT  Goal: Nutrient/Hydration intake appropriate for improving, restoring or maintaining nutritional needs  Monitor and assess patient's nutrition/hydration status for malnutrition (ex- brittle hair, bruises, dry skin, pale skin and conjunctiva, muscle wasting, smooth red tongue, and disorientation)  Collaborate with interdisciplinary team and initiate plan and interventions as ordered  Monitor patient's weight and dietary intake as ordered or per policy  Utilize nutrition screening tool and intervene per policy   Determine patient's food preferences and provide high-protein, high-caloric foods as appropriate  INTERVENTIONS:  - Monitor oral intake, urinary output, labs, and treatment plans  - Assess nutrition and hydration status and recommend course of action  - Evaluate amount of meals eaten  - Assist patient with eating if necessary   - Allow adequate time for meals  - Recommend/ encourage appropriate diets, oral nutritional supplements, and vitamin/mineral supplements  - Order, calculate, and assess calorie counts as needed  - Recommend, monitor, and adjust tube feedings and TPN/PPN based on assessed needs  - Assess need for intravenous fluids  - Provide specific nutrition/hydration education as appropriate  - Include patient/family/caregiver in decisions related to nutrition   Outcome: Progressing      Problem: DISCHARGE PLANNING - CARE MANAGEMENT  Goal: Discharge to post-acute care or home with appropriate resources  INTERVENTIONS:  - Conduct assessment to determine patient/family and health care team treatment goals, and need for post-acute services based on payer coverage, community resources, and patient preferences, and barriers to discharge  - Address psychosocial, clinical, and financial barriers to discharge as identified in assessment in conjunction with the patient/family and health care team  - Arrange appropriate level of post-acute services according to patient's   needs and preference and payer coverage in collaboration with the physician and health care team  - Communicate with and update the patient/family, physician, and health care team regarding progress on the discharge plan  - Arrange appropriate transportation to post-acute venues  - Discharge to home/facility when medically cleared     Admit Dx Bilat Toe Wounds / Cellulitis  Hx PVD, CHF, DM2, Seizures  Pt expressed undestanding of her diagnoses and treatment regimens  Pt has glucometer and scale for mgmt of CHF and DM  Pt denied any dc needs  Pend med progression; poss re-eval of needs      INTERVENTIONS:  - Conduct assessment to determine patient/family and health care team treatment goals, and need for post-acute services based on payer coverage, community resources, and patient preferences, and barriers to discharge  - Address psychosocial, clinical, and financial barriers to discharge as identified in assessment in conjunction with the patient/family and health care team  - Arrange appropriate level of post-acute services according to patient's   needs and preference and payer coverage in collaboration with the physician and health care team  - Communicate with and update the patient/family, physician, and health care team regarding progress on the discharge plan  - Arrange appropriate transportation to post-acute venues   Outcome: Progressing

## 2018-08-20 NOTE — SOCIAL WORK
Pt not medically clear for d/c  Pt is for contrast allergy prep today for a-gram tomorrow   Awaiting for possible surgical plans from vascular after a-gram

## 2018-08-20 NOTE — PROGRESS NOTES
Progress Note - Inpatient Pain Management    Salvador Shah [de-identified] y o  female MRN: [id number]  Unit/Bed#: -01 Encounter: [id number]      Assessment:   Principal Problem:    Cellulitis  Active Problems:    Essential hypertension    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 native heart without angina pectoris    Type 2 diabetes mellitus with complication, with long-term current use of insulin (HCC)    Atrial flutter (HCC)    Hyperlipidemia    Gastroesophageal reflux disease without esophagitis    Chronic combined systolic and diastolic congestive heart failure (HCC)    Paroxysmal atrial fibrillation (HCC)    Ulcer of toe of left foot (HCC)    Ulcer of toe of right foot (HCC)    History of CVA (cerebrovascular accident)    Seizure ([facility] [address] )    Anemia    PAD (peripheral artery disease) ([facility])    CKD (chronic kidney disease) stage 3, GFR 30-59 ml/min      Plan/Recommendations:   Acute left foot pain/ischemic pain  · Acetaminophen 975mg Q8 hours   · Increase Gabapentin to 100mg BID today  · Oxycodone 5mg Q4 hours PRN breakthrough pain    Plan for a-gram tomorrow  Reviewed with SOD-C    Pain History  Current pain location(s): left foot   Pain Scale:   6-10  Severity: moderate to severe  Quality: Throbbing  24 hour history: Patient sitting on side of bed  Left foot pain is present  Reported increased left foot pain this am which is slightly better at this time  Stated pain had improved with medication adjustments up until this morning  PRN Oxycodone is effective       Current Analgesic regimen:  Tylenol 975mg Q8 hours, Gabapentin 100mg QHS, Oxycodone 5mg Q4 hours PRN (3 doses)  Bowel Regimen: Colace BID, Miralax BID     Meds/Allergies   all current active meds have been reviewed and current meds:   Current Facility-Administered Medications   Medication Dose Route Frequency    acetaminophen (TYLENOL) tablet 975 mg  975 mg Oral Q8H [address]    albuterol (PROVENTIL HFA,VENTOLIN HFA) inhaler 1 puff  1 puff Inhalation BID PRN    amiodarone tablet 200 mg  200 mg Oral Daily With Breakfast    amLODIPine (NORVASC) tablet 10 mg  10 mg Oral Daily    aspirin chewable tablet 81 mg  81 mg Oral Daily    atorvastatin (LIPITOR) tablet 80 mg  80 mg Oral Daily    calcium carbonate (TUMS) chewable tablet 1,000 mg  1,000 mg Oral TID    ceFAZolin (ANCEF) IVPB (premix) 1,000 mg  1,000 mg Intravenous Q8H    [START ON 8/21/2018] diphenhydrAMINE (BENADRYL) tablet 50 mg  50 mg Oral 60 Min Pre-Op    docusate sodium (COLACE) capsule 100 mg  100 mg Oral BID    ferrous sulfate tablet 325 mg  325 mg Oral Every Other Day    fluticasone (FLONASE) 50 mcg/act nasal spray 1 spray  1 spray Nasal Daily    fluticasone (FLOVENT HFA) 110 MCG/ACT inhaler 2 puff  2 puff Inhalation Q12H [address]    furosemide (LASIX) injection 40 mg  40 mg Intravenous BID (diuretic)    gabapentin (NEURONTIN) capsule 100 mg  100 mg Oral HS    insulin glargine (LANTUS) subcutaneous injection 16 Units 0 16 mL  16 Units Subcutaneous HS    insulin glargine (LANTUS) subcutaneous injection 22 Units 0 22 mL  22 Units Subcutaneous QAM    insulin lispro (HumaLOG) 100 units/mL subcutaneous injection 2-12 Units  2-12 Units Subcutaneous 4x Daily (AC & HS)    levETIRAcetam (KEPPRA) tablet 750 mg  750 mg Oral Q12H [address]    levothyroxine tablet 100 mcg  100 mcg Oral Early Morning    loratadine (CLARITIN) tablet 10 mg  10 mg Oral Daily    magnesium oxide (MAG-OX) tablet 400 mg  400 mg Oral BID    methylPREDNISolone (MEDROL) tablet 32 mg  32 mg Oral Q10H    metoclopramide (REGLAN) tablet 5 mg  5 mg Oral Q6H PRN    metoprolol tartrate (LOPRESSOR) partial tablet 12 5 mg  12 5 mg Oral Q12H LANCE    nitroglycerin (NITROSTAT) SL tablet 0 4 mg  0 4 mg Sublingual Q3 min PRN    oxyCODONE (ROXICODONE) IR tablet 5 mg  5 mg Oral Q4H PRN    pantoprazole (PROTONIX) EC tablet 20 mg  20 mg Oral Early Morning    polyethylene glycol (MIRALAX) packet 17 g  17 g Oral BID       Allergies Allergen Reactions    Other Chest Pain     IVP-listed as "chest pain" in previous chart, patient stated this occurred "a long time ago" but does not remember exactly occurred     Cephalosporins Chest Pain    Contrast [Iodinated Diagnostic Agents] Other (See Comments)     Flash pulm edema    Doxycycline Chest Pain    Ketorolac Other (See Comments)     Chest pain     Levaquin [Levofloxacin] Chest Pain    Ondansetron Chest Pain     Prolonged QT    Toradol [Ketorolac Tromethamine] Chest Pain       Objective     Temp:  [98 °F (36 7 °C)-98 6 °F (37 °C)] 98 1 °F (36 7 °C)  HR:  [53-59] 59  Resp:  [18-20] 20  BP: (125-162)/(58-73) 162/69      Intake/Output Summary (Last 24 hours) at 08/20/18 1254  Last data filed at 08/20/18 0950   Gross per 24 hour   Intake             1315 ml   Output             2500 ml   Net            -1185 ml     Last Bowel Movement: Friday               Physical Exam: /69 (BP Location: Left arm)   Pulse 59   Temp 98 1 °F (36 7 °C) (Oral)   Resp 20   Ht 5' 4" (1 626 m)   Wt 73 1 kg (161 lb 1 6 oz)   LMP  (LMP Unknown)   SpO2 94%   BMI 27 65 kg/m²     General Appearance:    Alert, cooperative, no distress   Neurological:   Oriented to person, place, and time    Head:    Normocephalic, without obvious abnormality, atraumatic   Eyes:    EOM's intact   Back:     Symmetric, no curvature, ROM normal   Lungs:     Decreased, fine crackles right middle lobe, respirations unlabored   Chest Wall:    No tenderness or deformity   Abdomen:        Round, no distention     Heart:    Regular rate and rhythm   Extremities:   Extremities moderate ankle/pedal edema on Left    Skin:   Skin color pale, b/l foot dressings intact     Lab Results:   Results from last 7 days  Lab Units 08/18/18  1409   WBC Thousand/uL 10 53*   HEMOGLOBIN g/dL 9 1*   HEMATOCRIT % 29 2*   PLATELETS Thousands/uL 229      Results from last 7 days  Lab Units 08/18/18  0545   SODIUM mmol/L 138   POTASSIUM mmol/L 4 3 CHLORIDE mmol/L 102   CO2 mmol/L 27   BUN mg/dL 13   CREATININE mg/dL 0 78   CALCIUM mg/dL 8 9   GLUCOSE RANDOM mg/dL 84       Counseling / Coordination of Care  Total floor / unit time spent today 15 minutes  Greater than 50% of total time was spent with the patient and / or family counseling and / or coordination of care   A description of the counseling / coordination of care: Reviewed plan of care and medications with patient, RN staff and primary care team     Rodrigue Sharpe MS, RN-BC  Acute Pain

## 2018-08-20 NOTE — PROGRESS NOTES
Cardiology Progress Note - Jeanette Moya [de-identified] y o  female MRN: [id number]    Unit/Bed#: -01 Encounter: [id number]      Assessment:  Principal Problem:    Cellulitis  Active Problems:    Essential hypertension    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 native heart without angina pectoris    Type 2 diabetes mellitus with complication, with long-term current use of insulin (HCC)    Atrial flutter (HCC)   - in normal sinus rhythm   - Xarelto being held for possible surgical intervention    Hyperlipidemia    Gastroesophageal reflux disease without esophagitis    Chronic combined systolic and diastolic congestive heart failure (HCC)   - LVEF 35% by last echo  Paroxysmal atrial fibrillation (HCC)    Ulcer of toe of left foot (HCC)    Ulcer of toe of right foot ([facility] [address] )    History of CVA (cerebrovascular accident)    Seizure ([facility] [address] )    Anemia    PAD (peripheral artery disease) ([facility])    CKD (chronic kidney disease) stage 3, GFR 30-59 ml/min      Plan:    Ke Rosales remains on intravenous Lasix 40 mg b i d , -2 L overnight, still mildly volume overloaded but appears improving  Needs an updated basic metabolic panel in the setting of IV diuresis for close monitoring of electrolytes  This is ordered for the a m  Xarelto remains held considering anticipated possible surgical intervention for peripheral vascular disease  She remains in sinus rhythm on a rhythm control strategy with amiodarone  Subjective:   Patient seen and examined  No new events overnight, she offers no new complaints to me    She denies chest pain or shortness of breath, she feels her leg swelling is improving    Meds/Allergies   Allergies   Allergen Reactions    Other Chest Pain     IVP-listed as "chest pain" in previous chart, patient stated this occurred "a long time ago" but does not remember exactly occurred     Cephalosporins Chest Pain    Contrast [Iodinated Diagnostic Agents] Other (See Comments)     Flash pulm edema    Doxycycline Chest Pain    Ketorolac Other (See Comments)     Chest pain     Levaquin [Levofloxacin] Chest Pain    Ondansetron Chest Pain     Prolonged QT    Toradol [Ketorolac Tromethamine] Chest Pain       Current Facility-Administered Medications:     acetaminophen (TYLENOL) tablet 975 mg, 975 mg, Oral, Q8H [address], Max Estiven, DO, 975 mg at 08/20/18 1241    albuterol (PROVENTIL HFA,VENTOLIN HFA) inhaler 1 puff, 1 puff, Inhalation, BID PRN, Jess Larios, DO, 1 puff at 08/13/18 2119    amiodarone tablet 200 mg, 200 mg, Oral, Daily With Breakfast, Larmagda Lie, DO, 200 mg at 08/20/18 0746    amLODIPine (NORVASC) tablet 10 mg, 10 mg, Oral, Daily, Domnick Friday, DO, 10 mg at 08/20/18 0746    aspirin chewable tablet 81 mg, 81 mg, Oral, Daily, Larmagda Lie, DO, 81 mg at 08/20/18 0748    atorvastatin (LIPITOR) tablet 80 mg, 80 mg, Oral, Daily, Larna Lie, DO, 80 mg at 08/20/18 5562    calcium carbonate (TUMS) chewable tablet 1,000 mg, 1,000 mg, Oral, TID, Larmagda Larios DO, Stopped at 08/20/18 0900    ceFAZolin (ANCEF) IVPB (premix) 1,000 mg, 1,000 mg, Intravenous, Q8H, Patrick Retana MD, Stopped at 08/20/18 0825    [START ON 8/21/2018] diphenhydrAMINE (BENADRYL) tablet 50 mg, 50 mg, Oral, 60 Min Pre-Op, Sheri Rosenberg MD    docusate sodium (COLACE) capsule 100 mg, 100 mg, Oral, BID, Domnick Friday, DO, 100 mg at 08/20/18 8727    ferrous sulfate tablet 325 mg, 325 mg, Oral, Every Other Day, Sagar Baker DO, 325 mg at 08/20/18 0800    fluticasone (FLONASE) 50 mcg/act nasal spray 1 spray, 1 spray, Nasal, Daily, Jess Larios DO, 1 spray at 08/20/18 0752    fluticasone (FLOVENT HFA) 110 MCG/ACT inhaler 2 puff, 2 puff, Inhalation, Q12H [address], Jess Larios DO, 2 puff at 08/20/18 0752    furosemide (LASIX) injection 40 mg, 40 mg, Intravenous, BID (diuretic), Winsome Vora MD, 40 mg at 08/20/18 0750    gabapentin (NEURONTIN) capsule 100 mg, 100 mg, Oral, BID, Sagar Baker DO    insulin glargine (LANTUS) subcutaneous injection 16 Units 0 16 mL, 16 Units, Subcutaneous, HS, Yamila Mckenna MD, 16 Units at 08/19/18 2108    insulin glargine (LANTUS) subcutaneous injection 22 Units 0 22 mL, 22 Units, Subcutaneous, QAM, Yamila Mckenna MD, 22 Units at 08/20/18 0914    insulin lispro (HumaLOG) 100 units/mL subcutaneous injection 2-12 Units, 2-12 Units, Subcutaneous, 4x Daily (AC & HS), 4 Units at 08/20/18 1233 **AND** Fingerstick Glucose (POCT), , , TID ACHan DO    levETIRAcetam (KEPPRA) tablet 750 mg, 750 mg, Oral, Q12H [facility], Rusty Cummins DO, 750 mg at 08/20/18 3673    levothyroxine tablet 100 mcg, 100 mcg, Oral, Early Morning, Rusty Cummins DO, 100 mcg at 08/20/18 0522    loratadine (CLARITIN) tablet 10 mg, 10 mg, Oral, Daily, Rusty Cummins DO, 10 mg at 08/20/18 0748    magnesium oxide (MAG-OX) tablet 400 mg, 400 mg, Oral, BID, Lillyrigisselle Cummins DO, 400 mg at 08/20/18 0748    [START ON 8/21/2018] methylPREDNISolone (MEDROL) tablet 32 mg, 32 mg, Oral, Q10H, Bradly Amador MD    metoclopramide (REGLAN) tablet 5 mg, 5 mg, Oral, Q6H PRN, Rusty Cummins DO, 5 mg at 08/12/18 0727    metoprolol tartrate (LOPRESSOR) partial tablet 12 5 mg, 12 5 mg, Oral, Q12H [facility], Rusty Cummins DO, 12 5 mg at 08/20/18 0747    nitroglycerin (NITROSTAT) SL tablet 0 4 mg, 0 4 mg, Sublingual, Q3 min PRN, Rusty Cummins DO    oxyCODONE (ROXICODONE) IR tablet 5 mg, 5 mg, Oral, Q4H PRN, Melissa Moore DO, 5 mg at 08/20/18 1239    pantoprazole (PROTONIX) EC tablet 20 mg, 20 mg, Oral, Early Morning, Rusty Cummins, DO, 20 mg at 08/20/18 0522    polyethylene glycol (MIRALAX) packet 17 g, 17 g, Oral, BID, Max Jean-Baptiste, DO, 17 g at 08/20/18 0725    Objective   Vitals: Blood pressure 162/69, pulse 59, temperature 98 1 °F (36 7 °C), temperature source Oral, resp   rate 20, height 5' 4" (1 626 m), weight 73 1 kg (161 lb 1 6 oz), SpO2 94 %, not currently breastfeeding , Body mass index is 27 65 kg/m² , Orthostatic Blood Pressures      Most Recent Value   Blood Pressure  162/69 filed at 2018 0716   Patient Position - Orthostatic VS  Lying filed at 2018 6274        Systolic (52UUH), VHZ:699 , Min:125 , JK     Diastolic (43IVK), NMH:06, Min:58, Max:73      Intake/Output Summary (Last 24 hours) at 18 1445  Last data filed at 18 1401   Gross per 24 hour   Intake             1355 ml   Output             3200 ml   Net            -1845 ml     Weight (last 2 days)     None            Physical Exam:    GEN: Tom Bui appears well, alert and oriented x 3, pleasant and cooperative   NECK: supple, no carotid bruits, positive hepatojugular reflex  HEART: normal rate, regular rhythm, normal S1 and S2, 1/6 systolic murmur  LUNGS: clear to auscultation bilaterally; no wheezes, rales, or rhonchi   ABDOMEN: normal bowel sounds, soft, no tenderness, no distention  EXTREMITIES:  Pulses reduced, left greater than right edema, feet are wrapped bilaterally      Laboratory Results:        CBC with diff:   Results from last 7 days  Lab Units 18  1409 18  0607 08/15/18  0540 18  0530   WBC Thousand/uL 10 53* 12 12* 10 38* 10 27*   HEMOGLOBIN g/dL 9 1* 9 8* 9 3* 10 1*   HEMATOCRIT % 29 2* 32 0* 31 1* 32 5*   MCV fL 77* 78* 78* 78*   PLATELETS Thousands/uL 229 244 220 232   MCH pg 24 1* 24 0* 23 4* 24 1*   MCHC g/dL 31 2* 30 6* 29 9* 31 1*   RDW % 17 5* 18 0* 17 9* 17 9*   MPV fL 10 1 10 1 10 5 10 4   NRBC AUTO /100 WBCs  --  0  --  0         CMP:  Results from last 7 days  Lab Units 18  0545 18  0607 08/15/18  0540   SODIUM mmol/L 138 139 138   POTASSIUM mmol/L 4 3 4 2 4 0   CHLORIDE mmol/L 102 105 105   CO2 mmol/L 27 29 27   ANION GAP mmol/L 9 5 6   BUN mg/dL 13 17 12   CREATININE mg/dL 0 78 0 76 0 67   GLUCOSE RANDOM mg/dL 84 66 75   CALCIUM mg/dL 8 9 8 5 8 1*   EGFR ml/min/1 73sq m 72 74 83         BMP:  Results from last 7 days  Lab Units 18  0545 18  0607 08/15/18  0540   SODIUM mmol/L 138 139 138   POTASSIUM mmol/L 4 3 4 2 4 0   CHLORIDE mmol/L 102 105 105   CO2 mmol/L 27 29 27   BUN mg/dL 13 17 12   CREATININE mg/dL 0 78 0 76 0 67   GLUCOSE RANDOM mg/dL 84 66 75   CALCIUM mg/dL 8 9 8 5 8 1*       Coags:   Results from last 7 days  Lab Units 18  0610 18  0545   PTT seconds  --  29   INR  1 18* 1 37*       Cardiac testing:   Results for orders placed during the hospital encounter of 18   Echo complete with contrast if indicated    Narrative [address]  [facility], [address] [facility]  [phone]    Transthoracic Echocardiogram  2D, M-mode, Doppler, and Color Doppler    Study date:  01-May-2018    Patient: Akua Diaz  MR number: [id number]  Account number: [de-identified]  : 1940  Age: [de-identified] years  Gender: Female  Status: Inpatient  Location: Bedside  Height: 64 in  Weight: 161 7 lb  BP: 171/ 83 mmHg    Indications: Aortic Valve Disorder Evaluate prosthetic aortic valve  Evaluate prosthetic mitral valve  Diagnoses: I35 9 - Nonrheumatic aortic valve disorder, unspecified    Sonographer:  Chichi Lira RDCS  Primary Physician:  Nicolas Peterson MD  Group:  [facility]  Interpreting Physician:  Gilberto Hernandez MD    SUMMARY    LEFT VENTRICLE:  The ventricle was mildly dilated  Systolic function was moderately to markedly reduced  Ejection fraction was estimated to be 35 %  There was moderate diffuse hypokinesis with distinct regional wall motion abnormalities  More severe hypokinesis to akinesis was seen in the inferoseptal and inferior walls  Wall thickness was mildly increased  VENTRICULAR SEPTUM:  There was moderate dyssynergic motion  These changes are consistent with LBBB  RIGHT VENTRICLE:  The ventricle was moderately dilated  Systolic function was mildly reduced  LEFT ATRIUM:  The atrium was moderately dilated  RIGHT ATRIUM:  The atrium was moderately dilated      MITRAL VALVE:  There was marked annular calcification  There was moderate diffuse thickening  There was moderately reduced leaflet separation  Transmitral velocity was increased due to valvular stenosis  There was moderate stenosis  There was mild regurgitation  AORTIC VALVE:  A bioprosthesis was present  It exhibited normal function  There was no significant perivalvular regurgitation  Valve mean gradient was 8 mmHg  TRICUSPID VALVE:  There was moderate regurgitation  Pulmonary artery systolic pressure was markedly increased  Estimated peak PA pressure was 70 mmHg  IVC, HEPATIC VEINS:  The inferior vena cava was dilated  HISTORY: PRIOR HISTORY: TAVR, Mitral Stenosis, CABG, MI, HTN, Asthma, CHF, DM, CVA, CAD, HLD    PROCEDURE: The procedure was performed at the bedside  This was a routine study  The transthoracic approach was used  The study included complete 2D imaging, M-mode, complete spectral Doppler, and color Doppler  The heart rate was 100 bpm,  at the start of the study  Images were obtained from the parasternal, apical, subcostal, and suprasternal notch acoustic windows  Echocardiographic views were limited due to restricted patient mobility, poor patient compliance, and lung  interference  This was a technically difficult study  LEFT VENTRICLE: The ventricle was mildly dilated  Systolic function was moderately to markedly reduced  Ejection fraction was estimated to be 35 %  There was moderate diffuse hypokinesis with distinct regional wall motion abnormalities  More severe hypokinesis to akinesis was seen in the inferoseptal and inferior walls  Wall thickness was mildly increased  DOPPLER: The study was not technically sufficient to allow evaluation of LV diastolic function  VENTRICULAR SEPTUM: Thickness was mildly increased  There was moderate dyssynergic motion  These changes are consistent with LBBB  RIGHT VENTRICLE: The ventricle was moderately dilated  Systolic function was mildly reduced  Wall thickness was normal     LEFT ATRIUM: The atrium was moderately dilated  RIGHT ATRIUM: The atrium was moderately dilated  MITRAL VALVE: There was marked annular calcification  There was moderate diffuse thickening  There was moderately reduced leaflet separation  DOPPLER: Transmitral velocity was increased due to valvular stenosis  There was moderate  stenosis  There was mild regurgitation  AORTIC VALVE: A bioprosthesis was present  It exhibited normal function  DOPPLER: Transaortic velocity was within the normal range  There was no evidence for stenosis  There was no significant regurgitation  There was no significant  perivalvular regurgitation  TRICUSPID VALVE: The valve structure was normal  DOPPLER: The transtricuspid velocity was within the normal range  There was moderate regurgitation  Pulmonary artery systolic pressure was markedly increased  Estimated peak PA pressure was  70 mmHg  PULMONIC VALVE: Not well visualized  DOPPLER: The transpulmonic velocity was within the normal range  There was no regurgitation  PERICARDIUM: There was no pericardial effusion  AORTA: The root exhibited normal size  The ascending aorta was normal in size  SYSTEMIC VEINS: IVC: The inferior vena cava was dilated  PULMONARY VEINS: DOPPLER: Doppler signals were not recordable in the pulmonary vein(s)  MEASUREMENT TABLES    DOPPLER MEASUREMENTS  Aortic valve   (Reference normals)  Mean gradient   8 mmHg   (--)    SYSTEM MEASUREMENT TABLES    2D  %FS: 15 07 %  Ao Diam: 2 75 cm  EDV(Teich): 139 25 ml  EF(Teich): 31 62 %  ESV(Teich): 95 22 ml  IVSd: 1 2 cm  LA Area: 23 57 cm2  LA Diam: 4 16 cm  LVEDV MOD A4C: 93 35 ml  LVEF MOD A4C: 41 28 %  LVESV MOD A4C: 54 81 ml  LVIDd: 5 37 cm  LVIDs: 4 56 cm  LVLd A4C: 7 64 cm  LVLs A4C: 6 64 cm  LVOT Diam: 1 9 cm  LVPWd: 1 21 cm  RA Area: 25 62 cm2  RVIDd: 5 25 cm  SV MOD A4C: 38 53 ml  SV(Teich): 44 03 ml    CW  AV Env  Ti: 318 34 ms  AV VTI: 47 91 cm  AV Vmax: 2 36 m/s  AV Vmean: 1 5 m/s  AV maxP 29 mmHg  AV meanPG: 10 48 mmHg  TR Vmax: 3 84 m/s  TR maxP 13 mmHg    MM  TAPSE: 1 4 cm    PW  CHER (VTI): 1 05 cm2  CHER Vmax: 1 03 cm2  E': 0 03 m/s  E/E': 81 41  HR: 173 4 BPM  LVOT Env  Ti: 332 18 ms  LVOT VTI: 17 78 cm  LVOT Vmax: 0 86 m/s  LVOT Vmean: 0 54 m/s  LVOT maxP 95 mmHg  LVOT meanP 37 mmHg  LVSI Dopp: 28 05 ml/m2  LVSV Dopp: 50 2 ml  MV A Jayant: 2 32 m/s  MV Dec [place]: 11 07 m/s2  MV DecT: 201 33 ms  MV E Jayant: 2 23 m/s  MV E/A Ratio: 0 96  MV PHT: 58 39 ms  MV VTI: 50 85 cm  MV Vmax: 2 29 m/s  MV Vmean: 1 49 m/s  MV maxP 89 mmHg  MV meanPG: 10 01 mmHg  MVA (VTI): 0 99 cm2  MVA By PHT: 3 77 cm2    [facility]    Prepared and electronically signed by    Mary Ellen Thomas MD  Signed [id number] 15:08:38       Results for orders placed during the hospital encounter of 18   ROBB    Narrative [facility] [address]  [facility], [address] [facility]  [phone]    Transesophageal Echocardiogram  2D, Doppler, and Color Doppler    Study date:  2018    Patient: Natanael Clemente  MR number: [id number]  Account number: [de-identified]  : 1940  Age: [de-identified] years  Gender: Female  Status: Inpatient  Location: Echo lab  Height: 64 in  Weight: 167 lb  BP: 116/ 52 mmHg    Indications: CVA    Diagnoses: R55  - Syncope and collapse    Sonographer:  SIOBHAN Bruce  Primary Physician:  Lila Renee MD  Referring Physician:  Carol Ann Brar MD  Group:  [facility]  Interpreting Physician:  Han Espino MD    SUMMARY    LEFT VENTRICLE:  Systolic function was normal  Ejection fraction was estimated to be 55 %  Although no diagnostic regional wall motion abnormality was identified, this possibility cannot be completely excluded on the basis of this study  Wall thickness was increased  Concentric hypertrophy was present      LEFT ATRIAL APPENDAGE:  No thrombus was identified  ATRIAL SEPTUM:  The septum bows from left to right, consistent with increased left atrial pressure  No defect or patent foramen ovale was identified by color Doppler or after injection of agitated saline  MITRAL VALVE:  There was moderate annular calcification  There was mild to moderate stenosis  Vmax 1 7 m/s, mean gradient 5 6 mm Hg, maximum gradient 11 mm Hg at a HR of 78 BPM   There was mild regurgitation  AORTIC VALVE:  A bioprosthesis was present  It exhibited normal function  TRICUSPID VALVE:  There was moderate to severe regurgitation  Estimated peak PA pressure was 55 mmHg  The findings suggest moderate pulmonary hypertension  HISTORY: PRIOR HISTORY: TAVR, Mitral Stenosis , CABG, MI, Hypertension, Asthma, CHF    PROCEDURE: The procedure was performed in the echo lab  This was a routine study  The risks and alternatives of the procedure were explained to the patient and informed consent was obtained  The transesophageal approach was used  The study  included complete 2D imaging, limited spectral Doppler, and color Doppler  The heart rate was 74 bpm, at the start of the study  An adult omniplane probe was inserted by the attending cardiologist  Intubated with ease  One intubation  attempt(s)  There was no blood detected on the probe  Image quality was adequate  There were no complications during the procedure  MEDICATIONS: Anesthesia administered by anesthesia team     LEFT VENTRICLE: Size was normal  Systolic function was normal  Ejection fraction was estimated to be 55 %  Although no diagnostic regional wall motion abnormality was identified, this possibility cannot be completely excluded on the basis  of this study  Wall thickness was increased  Concentric hypertrophy was present  RIGHT VENTRICLE: The size was normal  Systolic function was normal     LEFT ATRIUM: The atrium was dilated  No thrombus was identified  APPENDAGE: The appendage was dilated   No thrombus was identified  DOPPLER: The function was reduced (reduced emptying velocity)  ATRIAL SEPTUM: The septum bows from left to right, consistent with increased left atrial pressure  No defect or patent foramen ovale was identified by color Doppler or after injection of agitated saline  MITRAL VALVE: There was moderate annular calcification  There was mild-moderate calcification  There was mildly reduced leaflet separation  DOPPLER: There was mild to moderate stenosis  Vmax 1 7 m/s, mean gradient 5 6 mm Hg, maximum  gradient 11 mm Hg at a HR of 78 BPM  There was mild regurgitation  AORTIC VALVE: A bioprosthesis was present  It exhibited normal function  DOPPLER: There was no evidence for stenosis  There was no significant regurgitation  TRICUSPID VALVE: The valve structure was normal  There was normal leaflet separation  There was no echocardiographic evidence of vegetation  DOPPLER: There was moderate to severe regurgitation  Estimated peak PA pressure was 55 mmHg  The  findings suggest moderate pulmonary hypertension  PULMONIC VALVE: Leaflets exhibited normal thickness, no calcification, and normal cuspal separation  There was no echocardiographic evidence of vegetation  PERICARDIUM: There was no pericardial effusion  The pericardium was normal in appearance  AORTA: The root exhibited normal size  There was no atheroma  There was no evidence for dissection  There was no evidence for aneurysm      [address] Accredited Echocardiography Laboratory    Prepared and electronically signed by    Sarah Beth Gonzalez MD  Signed 18-Apr-2018 14:48:57       Results for orders placed during the hospital encounter of 03/15/18   Cardiac catheterization    Narrative [address]  [address] [place] [address]  [phone]    [facility]    Invasive Cardiovascular Lab Complete Report    Patient: Ayesha Alvarenga  MR number: [id number]  Account number: [de-identified]  Study date: 2018  Gender: Female  : 1940  Height: 64 2 in  Weight: 163 7 lb  BSA: 1 8 m squared    Allergies: OTHER, CEPHALOSPORINS, IODINATED DIAGNOSTIC AGENTS, DOXYCYCLINE, LEVOFLOXACIN, ONDANSETRON, KETOROLAC TROMETHAMINE    Diagnostic Cardiologist:  Johan Newby MD  Primary Physician:  Stan Trujillo MD    SUMMARY    CORONARY CIRCULATION:  Mid LAD: There was a 90 % stenosis  1st diagonal: There was a 100 % stenosis at the ostium of the vessel segment  2nd diagonal: There was a 100 % stenosis at the ostium of the vessel segment  3rd diagonal: There was a 100 % stenosis at the ostium of the vessel segment  1st obtuse marginal: There was a 100 % stenosis at the ostium of the vessel segment  Proximal RCA: There was a 60 % stenosis  The lesion was hazy and ulcerated  It appears amenable to percutaneous intervention  Right PDA: There was a 50 % stenosis  Graft to the 1st diagonal: There was a discrete 50 % stenosis at the proximal anastomosis  It appears amenable to percutaneous intervention  Graft to the 1st obtuse marginal: There was a 100 % stenosis at the proximal anastomosis  Graft to the RPDA: There was a 100 % stenosis at the proximal anastomosis  INDICATIONS:  --  Possible CAD: myocardial infarction without ST elevation (NSTEMI)  PROCEDURES PERFORMED    --  Left coronary angiography  --  Right coronary angiography  --  Saphenous vein graft angiography  --  LIMA graft angiography  --  Inpatient  --  Mod Sedation Same Physician Initial 15min  --  Mod Sedation Same Physician Add 15min  --  Mod Sedation Same Physician Add 15min  --  Mod Sedation Same Physician Add 15min  --  Coronary Catheterization (w/o LHC, w/ Grafts  PROCEDURE: The risks and alternatives of the procedures and conscious sedation were explained to the patient and informed consent was obtained  The patient was brought to the cath lab and placed on the table   The planned puncture sites  were prepped and draped in the usual sterile fashion  --  Right femoral artery access  The puncture site was infiltrated with local anesthetic  The vessel was accessed using the modified Seldinger technique, a wire was advanced into the vessel, and a sheath was advanced over the wire into the  vessel  --  Left coronary artery angiography  A catheter was advanced over a guidewire into the aorta and positioned in the left coronary artery ostium under fluoroscopic guidance  Angiography was performed  --  Right coronary artery angiography  A catheter was advanced over a guidewire into the aorta and positioned in the right coronary artery ostium under fluoroscopic guidance  Angiography was performed  --  Saphenous vein graft angiography  A catheter was advanced to the aorta and positioned at the aortic anastomosis of the graft over a guidewire under fluoroscopic guidance  Angiography was performed  --  Left internal mammary graft angiography  A catheter was advanced to the aorta and positioned in the left subclavian artery over a guidewire under fluoroscopic guidance  Angiography was performed  --  Inpatient  --  Mod Sedation Same Physician Initial 15min  --  Mod Sedation Same Physician Add 15min  --  Mod Sedation Same Physician Add 15min  --  Mod Sedation Same Physician Add 15min  --  Coronary Catheterization (w/o LHC, w/ Grafts  PROCEDURE COMPLETION: The patient tolerated the procedure well and was discharged from the cath lab  TIMING: Test started at 12:57  Test concluded at 13:55  HEMOSTASIS: The sheath was removed  The site was compressed manually  Hemostasis  was obtained  MEDICATIONS GIVEN: Benadryl (50mg/ml), 50 mg, IV, at 12:54  Hydrocortisone 100mg/ml, 100 mg, IV, at 12:54  Fentanyl (1OOmcg/2 ml), 50 mcg, IV, at 12:55  Versed (2mg/2ml), 1 mg, IV, at 12:55  1% Lidocaine, 10 ml,  subcutaneously, at 12:58  Versed (2mg/2ml), 0 5 mg, IV, at 13:00  Fentanyl (1OOmcg/2 ml), 25 mcg, IV, at 13:00   Versed (2mg/2ml), 0 5 mg, IV, at 13:30  Fentanyl (1OOmcg/2 ml), 25 mcg, IV, at 13:30  Versed (2mg/2ml), 1 mg, IV, at 13:35  Fentanyl (1OOmcg/2 ml), 50 mcg, IV, at 13:35  CONTRAST GIVEN: 100 ml Omnipaque (350mg I /ml)  100 ml Omnipaque (350mg I /ml)  RADIATION EXPOSURE: Fluoroscopy time: 26 75 min  CORONARY VESSELS:   --  The coronary circulation is right dominant  --  Left main: The vessel was medium to large sized  Angiography showed minor luminal irregularities  --  Mid LAD: There was a 90 % stenosis  --  Distal LAD: The vessel was medium to large sized  The artery was supplied by a patent bypass graft  --  1st diagonal: There was a 100 % stenosis at the ostium of the vessel segment  --  2nd diagonal: The vessel was small to medium sized and mildly ectatic  Angiography showed moderate atherosclerosis  There was a 100 % stenosis at the ostium of the vessel segment  --  3rd diagonal: The vessel was small sized  The artery was supplied by a patent bypass graft  There was a 100 % stenosis at the ostium of the vessel segment  --  1st obtuse marginal: There was a 100 % stenosis at the ostium of the vessel segment  --  Proximal RCA: There was a 60 % stenosis  The lesion was hazy and ulcerated  It appears amenable to percutaneous intervention  --  Right PDA: There was a 50 % stenosis  --  Graft to the distal LAD: The graft was a small to medium sized LIMA  --  Graft to the 1st diagonal: The graft was a medium to large sized saphenous vein graft from the aorta  Graft angiography showed moderate atherosclerosis and mild degeneration  There was a discrete 50 % stenosis at the proximal  anastomosis  It appears amenable to percutaneous intervention    --  Graft to the 2nd diagonal: The graft was a medium to large sized saphenous vein graft from first diagonal   --  Graft to the 3rd diagonal: The graft was a saphenous vein graft from second diagonal   --  Graft to the 1st obtuse marginal: The graft was a saphenous vein graft from the aorta  There was a 100 % stenosis at the proximal anastomosis  --  Graft to the RPDA: The graft was a saphenous vein graft from the aorta  There was a 100 % stenosis at the proximal anastomosis  IMPRESSIONS:  There is significant triple vessel coronary artery disease  Patent LIMA-LAD, patent SVG D1-2-3/sequential(mild degeneration)  occluded(OLD) SVG om1 and SVG PDA    RECOMMENDATIONS  The patient should continue with the present medications  Consultation be obtained for aortic valve replacement  Prepared and signed by    Alma Abrams MD  Signed 2018 14:06:04    Study diagram    Angiographic findings  Native coronary lesions:  ·Mid LAD: Lesion 1: 90 % stenosis  ·D1: Lesion 1: 100 % stenosis  ·D2: Lesion 1: 100 % stenosis  ·D3: Lesion 1: 100 % stenosis  ·OM1: Lesion 1: 100 % stenosis  ·Proximal RCA: Lesion 1: 60 % stenosis  ·RPDA: Lesion 1: 50 % stenosis  Coronary graft lesions:  ·Graft to D1: SVG  · 50 % stenosis at proximal anastomosis, discrete  ·Graft to OM1: SVG  · 100 % stenosis at proximal anastomosis  ·Graft to RPDA: SVG  · 100 % stenosis at proximal anastomosis  Hemodynamic tables    Pressures:  Baseline  Pressures:  - HR: 78  Pressures:  - Rhythm:  Pressures:  -- Aortic Pressure (S/D/M): 145/71/100    Outputs:  Baseline  Outputs:  -- CALCULATIONS: [age]  Outputs:  -- CALCULATIONS: Body Surface Area: 1 80  Outputs:  -- CALCULATIONS: Height in cm: 163 00  Outputs:  -- CALCULATIONS: Sex: Female  Outputs:  -- CALCULATIONS: Weight in k 40             Lida George MD    Portions of the record may have been created with voice recognition software   Occasional wrong word or "sound a like" substitutions may have occurred due to the inherent limitations of voice recognition software   Read the chart carefully and recognize, using context, where substitutions have occurred

## 2018-08-20 NOTE — PLAN OF CARE
Problem: Potential for Falls  Goal: Patient will remain free of falls  INTERVENTIONS:  - Assess patient frequently for physical needs  -  Identify cognitive and physical deficits and behaviors that affect risk of falls  -  Jackson fall precautions as indicated by assessment   - Educate patient/family on patient safety including physical limitations  - Instruct patient to call for assistance with activity based on assessment  - Modify environment to reduce risk of injury  - Consider OT/PT consult to assist with strengthening/mobility   Outcome: Progressing      Problem: Prexisting or High Potential for Compromised Skin Integrity  Goal: Skin integrity is maintained or improved  INTERVENTIONS:  - Identify patients at risk for skin breakdown  - Assess and monitor skin integrity  - Assess and monitor nutrition and hydration status  - Monitor labs (i e  albumin)  - Assess for incontinence   - Turn and reposition patient  - Assist with mobility/ambulation  - Relieve pressure over bony prominences  - Avoid friction and shearing  - Provide appropriate hygiene as needed including keeping skin clean and dry  - Evaluate need for skin moisturizer/barrier cream  - Collaborate with interdisciplinary team (i e  Nutrition, Rehabilitation, etc )   - Patient/family teaching   Outcome: Progressing      Problem: Nutrition/Hydration-ADULT  Goal: Nutrient/Hydration intake appropriate for improving, restoring or maintaining nutritional needs  Monitor and assess patient's nutrition/hydration status for malnutrition (ex- brittle hair, bruises, dry skin, pale skin and conjunctiva, muscle wasting, smooth red tongue, and disorientation)  Collaborate with interdisciplinary team and initiate plan and interventions as ordered  Monitor patient's weight and dietary intake as ordered or per policy  Utilize nutrition screening tool and intervene per policy   Determine patient's food preferences and provide high-protein, high-caloric foods as appropriate  INTERVENTIONS:  - Monitor oral intake, urinary output, labs, and treatment plans  - Assess nutrition and hydration status and recommend course of action  - Evaluate amount of meals eaten  - Assist patient with eating if necessary   - Allow adequate time for meals  - Recommend/ encourage appropriate diets, oral nutritional supplements, and vitamin/mineral supplements  - Order, calculate, and assess calorie counts as needed  - Recommend, monitor, and adjust tube feedings and TPN/PPN based on assessed needs  - Assess need for intravenous fluids  - Provide specific nutrition/hydration education as appropriate  - Include patient/family/caregiver in decisions related to nutrition   Outcome: Progressing      Problem: DISCHARGE PLANNING - CARE MANAGEMENT  Goal: Discharge to post-acute care or home with appropriate resources  INTERVENTIONS:  - Conduct assessment to determine patient/family and health care team treatment goals, and need for post-acute services based on payer coverage, community resources, and patient preferences, and barriers to discharge  - Address psychosocial, clinical, and financial barriers to discharge as identified in assessment in conjunction with the patient/family and health care team  - Arrange appropriate level of post-acute services according to patient's   needs and preference and payer coverage in collaboration with the physician and health care team  - Communicate with and update the patient/family, physician, and health care team regarding progress on the discharge plan  - Arrange appropriate transportation to post-acute venues  - Discharge to home/facility when medically cleared     Admit Dx Bilat Toe Wounds / Cellulitis  Hx PVD, CHF, DM2, Seizures  Pt expressed undestanding of her diagnoses and treatment regimens  Pt has glucometer and scale for mgmt of CHF and DM  Pt denied any dc needs  Pend med progression; poss re-eval of needs      INTERVENTIONS:  - Conduct assessment to determine patient/family and health care team treatment goals, and need for post-acute services based on payer coverage, community resources, and patient preferences, and barriers to discharge  - Address psychosocial, clinical, and financial barriers to discharge as identified in assessment in conjunction with the patient/family and health care team  - Arrange appropriate level of post-acute services according to patient's   needs and preference and payer coverage in collaboration with the physician and health care team  - Communicate with and update the patient/family, physician, and health care team regarding progress on the discharge plan  - Arrange appropriate transportation to post-acute venues   Outcome: Progressing

## 2018-08-20 NOTE — PROGRESS NOTES
Progress Note - Vascular Surgery   Venia Boys [de-identified] y o  female MRN: [id number]  Unit/Bed#: [facility]4-01 Encounter: [id number]    Assessment:  [de-identified] F w/ PAD, dry gangrene on b/l 1st toes, L 2nd toe, R 3rd toe now with L foot edema and erythema  - s/p 8/10 IR RLE angiogram with angioplasty of R distal SFA and mid R peroneal artery    Plan:  - NPO  - patient for repeat Agram  - likely to be tomorrow considering patient needs contrast allergy prep  - RLE local wound care  - f/u APS      Subjective/Objective   Chief Complaint:     Subjective: no acute events, been NPO since midnight     Objective:     Blood pressure 125/73, pulse (!) 53, temperature 98 °F (36 7 °C), temperature source Oral, resp  rate 20, height 5' 4" (1 626 m), weight 73 1 kg (161 lb 1 6 oz), SpO2 94 %, not currently breastfeeding  ,Body mass index is 27 65 kg/m²  Intake/Output Summary (Last 24 hours) at 08/20/18 8854  Last data filed at 08/20/18 0401   Gross per 24 hour   Intake              620 ml   Output             3300 ml   Net            -2680 ml       Invasive Devices     Peripheral Intravenous Line            Peripheral IV 08/18/18 Right Forearm 1 day                Physical Exam:   NAD  Norm resp effort  RRR  Abd soft, NT/ND  LLE with rubor and edema, tender to palpation, dopp DP/PT      Lab, Imaging and other studies:  I have personally reviewed pertinent lab results    , CBC:   No results found for: WBC, HGB, HCT, MCV, PLT, ADJUSTEDWBC, MCH, MCHC, RDW, MPV, NRBC, CMP:   No results found for: NA, K, CL, CO2, ANIONGAP, BUN, CREATININE, GLUCOSE, CALCIUM, AST, ALT, ALKPHOS, PROT, ALBUMIN, BILITOT, EGFR  VTE Pharmacologic Prophylaxis: Reason for no pharmacologic prophylaxis on Xarelto  VTE Mechanical Prophylaxis: sequential compression device

## 2018-08-20 NOTE — PROGRESS NOTES
Progress Note - Infectious Disease   Fran Llamas [de-identified] y o  female MRN: [id number]  Unit/Bed#: -01 Encounter: [id number]      Impression/Recommendations:  1   Chronic bilateral foot ischemic ulcers with possible left foot cellulitis  Consider chronic osteomyelitis of right 3rd toe  Positive wound cultures likely represent wound colonizers  Clinically stable without sepsis  Improving  Rec:  ? Continue cefazolin for now  ? Await A-gram on left per vascular surgery tomorrow  ? Continue [address] per podiatry  ? Await final surgical plan from vascular surgery  ? Follow temperatures closely  ? Supportive care as per the primary service     2   PAD  Status PTA on right  Rec:  ? Close vascular surgery follow-up  ? Await A-gram on left per vascular surgery tomorrow    3  CAD with ICM  High risk for surgery per cardiology  Rec:  · Close cardiology follow-up ongoing    Discussed with Dr Monsivais  Antibiotics:  Cefazolin #4    Subjective:  Patient seen on AM rounds  Feels well  Only complaint is left foot is occasionally painful  Denies fevers, chills, sweats, nausea, vomiting, or diarrhea  24 Hour Events:  No documented fevers, chills, sweats, nausea, vomiting, or diarrhea  Objective:  Vitals:  HR:  [53-59] 59  Resp:  [18-20] 20  BP: (125-162)/(58-73) 162/69  SpO2:  [94 %-96 %] 94 %  Temp (24hrs), Av 2 °F (36 8 °C), Min:98 °F (36 7 °C), Max:98 6 °F (37 °C)  Current: Temperature: 98 1 °F (36 7 °C)    Physical Exam:   General:  No acute distress  Eyes:  Normal lids and conjunctivae  ENT:  Normal external ears and nose  Neck:  Neck symmetric with midline trachea  Pulmonary:  Normal respiratory effort without accessory muscle use  Cardiovascular:  Regular rate and rhythm; no peripheral edema  Gastrointestinal:  No tenderness or distention  Musculoskeletal:  No digital clubbing or cyanosis  Skin:  No visible rashes;  No palpable nodules  Neurologic:  Sensation grossly intact to light touch  Psychiatric:  Alert and oriented; Normal mood  Left leg: Foot painful, minimal erythema on calf, stable dry ulcers    Lab Results:  I have personally reviewed pertinent labs  Results from last 7 days  Lab Units 08/18/18  0545 08/16/18  0607 08/15/18  0540   SODIUM mmol/L 138 139 138   POTASSIUM mmol/L 4 3 4 2 4 0   CHLORIDE mmol/L 102 105 105   CO2 mmol/L 27 29 27   ANION GAP mmol/L 9 5 6   BUN mg/dL 13 17 12   CREATININE mg/dL 0 78 0 76 0 67   EGFR ml/min/1 73sq m 72 74 83   GLUCOSE RANDOM mg/dL 84 66 75   CALCIUM mg/dL 8 9 8 5 8 1*       Results from last 7 days  Lab Units 08/18/18  1409 08/16/18  0607 08/15/18  0540   WBC Thousand/uL 10 53* 12 12* 10 38*   HEMOGLOBIN g/dL 9 1* 9 8* 9 3*   PLATELETS Thousands/uL 229 244 220           Imaging Studies:   I have personally reviewed pertinent imaging study reports and images in PACS  EKG, Pathology, and Other Studies:   I have personally reviewed pertinent reports

## 2018-08-20 NOTE — PLAN OF CARE
Problem: DISCHARGE PLANNING - CARE MANAGEMENT  Goal: Discharge to post-acute care or home with appropriate resources  INTERVENTIONS:  - Conduct assessment to determine patient/family and health care team treatment goals, and need for post-acute services based on payer coverage, community resources, and patient preferences, and barriers to discharge  - Address psychosocial, clinical, and financial barriers to discharge as identified in assessment in conjunction with the patient/family and health care team  - Arrange appropriate level of post-acute services according to patient's   needs and preference and payer coverage in collaboration with the physician and health care team  - Communicate with and update the patient/family, physician, and health care team regarding progress on the discharge plan  - Arrange appropriate transportation to post-acute venues  - Discharge to home/facility when medically cleared     Admit Dx Bilat Toe Wounds / Cellulitis  Hx PVD, CHF, DM2, Seizures  Pt expressed undestanding of her diagnoses and treatment regimens  Pt has glucometer and scale for mgmt of CHF and DM  Pt denied any dc needs  Pend med progression; poss re-eval of needs      INTERVENTIONS:  - Conduct assessment to determine patient/family and health care team treatment goals, and need for post-acute services based on payer coverage, community resources, and patient preferences, and barriers to discharge  - Address psychosocial, clinical, and financial barriers to discharge as identified in assessment in conjunction with the patient/family and health care team  - Arrange appropriate level of post-acute services according to patient's   needs and preference and payer coverage in collaboration with the physician and health care team  - Communicate with and update the patient/family, physician, and health care team regarding progress on the discharge plan  - Arrange appropriate transportation to post-acute venues  Outcome: Progressing

## 2018-08-21 ENCOUNTER — APPOINTMENT (INPATIENT)
Dept: RADIOLOGY | Facility: HOSPITAL | Age: 78
DRG: 252 | End: 2018-08-21
Payer: MEDICARE

## 2018-08-21 LAB
ANION GAP SERPL CALCULATED.3IONS-SCNC: 5 MMOL/L (ref 4–13)
BASOPHILS # BLD AUTO: 0.02 THOUSANDS/ΜL (ref 0–0.1)
BASOPHILS NFR BLD AUTO: 0 % (ref 0–1)
BUN SERPL-MCNC: 16 MG/DL (ref 5–25)
CALCIUM SERPL-MCNC: 8.6 MG/DL (ref 8.3–10.1)
CHLORIDE SERPL-SCNC: 99 MMOL/L (ref 100–108)
CO2 SERPL-SCNC: 28 MMOL/L (ref 21–32)
CREAT SERPL-MCNC: 0.97 MG/DL (ref 0.6–1.3)
EOSINOPHIL # BLD AUTO: 0.01 THOUSAND/ΜL (ref 0–0.61)
EOSINOPHIL NFR BLD AUTO: 0 % (ref 0–6)
ERYTHROCYTE [DISTWIDTH] IN BLOOD BY AUTOMATED COUNT: 17.4 % (ref 11.6–15.1)
GFR SERPL CREATININE-BSD FRML MDRD: 55 ML/MIN/1.73SQ M
GLUCOSE SERPL-MCNC: 205 MG/DL (ref 65–140)
GLUCOSE SERPL-MCNC: 212 MG/DL (ref 65–140)
GLUCOSE SERPL-MCNC: 224 MG/DL (ref 65–140)
GLUCOSE SERPL-MCNC: 313 MG/DL (ref 65–140)
GLUCOSE SERPL-MCNC: 346 MG/DL (ref 65–140)
HCT VFR BLD AUTO: 31.9 % (ref 34.8–46.1)
HGB BLD-MCNC: 9.9 G/DL (ref 11.5–15.4)
IMM GRANULOCYTES # BLD AUTO: 0.07 THOUSAND/UL (ref 0–0.2)
IMM GRANULOCYTES NFR BLD AUTO: 1 % (ref 0–2)
LYMPHOCYTES # BLD AUTO: 1.03 THOUSANDS/ΜL (ref 0.6–4.47)
LYMPHOCYTES NFR BLD AUTO: 9 % (ref 14–44)
MCH RBC QN AUTO: 23.6 PG (ref 26.8–34.3)
MCHC RBC AUTO-ENTMCNC: 31 G/DL (ref 31.4–37.4)
MCV RBC AUTO: 76 FL (ref 82–98)
MONOCYTES # BLD AUTO: 0.16 THOUSAND/ΜL (ref 0.17–1.22)
MONOCYTES NFR BLD AUTO: 2 % (ref 4–12)
NEUTROPHILS # BLD AUTO: 9.64 THOUSANDS/ΜL (ref 1.85–7.62)
NEUTS SEG NFR BLD AUTO: 88 % (ref 43–75)
NRBC BLD AUTO-RTO: 0 /100 WBCS
PLATELET # BLD AUTO: 292 THOUSANDS/UL (ref 149–390)
PMV BLD AUTO: 10.3 FL (ref 8.9–12.7)
POTASSIUM SERPL-SCNC: 4.3 MMOL/L (ref 3.5–5.3)
RBC # BLD AUTO: 4.19 MILLION/UL (ref 3.81–5.12)
SODIUM SERPL-SCNC: 132 MMOL/L (ref 136–145)
WBC # BLD AUTO: 10.93 THOUSAND/UL (ref 4.31–10.16)

## 2018-08-21 PROCEDURE — 99153 MOD SED SAME PHYS/QHP EA: CPT

## 2018-08-21 PROCEDURE — 36246 INS CATH ABD/L-EXT ART 2ND: CPT

## 2018-08-21 PROCEDURE — C1769 GUIDE WIRE: HCPCS

## 2018-08-21 PROCEDURE — 75625 CONTRAST EXAM ABDOMINL AORTA: CPT

## 2018-08-21 PROCEDURE — 82948 REAGENT STRIP/BLOOD GLUCOSE: CPT

## 2018-08-21 PROCEDURE — 76937 US GUIDE VASCULAR ACCESS: CPT

## 2018-08-21 PROCEDURE — C1760 CLOSURE DEV, VASC: HCPCS

## 2018-08-21 PROCEDURE — 75710 ARTERY X-RAYS ARM/LEG: CPT

## 2018-08-21 PROCEDURE — C1894 INTRO/SHEATH, NON-LASER: HCPCS

## 2018-08-21 PROCEDURE — 80048 BASIC METABOLIC PNL TOTAL CA: CPT | Performed by: INTERNAL MEDICINE

## 2018-08-21 PROCEDURE — 99232 SBSQ HOSP IP/OBS MODERATE 35: CPT | Performed by: INTERNAL MEDICINE

## 2018-08-21 PROCEDURE — 99152 MOD SED SAME PHYS/QHP 5/>YRS: CPT

## 2018-08-21 PROCEDURE — 99233 SBSQ HOSP IP/OBS HIGH 50: CPT | Performed by: INTERNAL MEDICINE

## 2018-08-21 PROCEDURE — 85025 COMPLETE CBC W/AUTO DIFF WBC: CPT | Performed by: INTERNAL MEDICINE

## 2018-08-21 RX ORDER — INSULIN GLARGINE 100 [IU]/ML
22 INJECTION, SOLUTION SUBCUTANEOUS EVERY 12 HOURS SCHEDULED
Status: DISCONTINUED | OUTPATIENT
Start: 2018-08-21 | End: 2018-08-21

## 2018-08-21 RX ORDER — MIDAZOLAM HYDROCHLORIDE 1 MG/ML
INJECTION INTRAMUSCULAR; INTRAVENOUS CODE/TRAUMA/SEDATION MEDICATION
Status: COMPLETED | OUTPATIENT
Start: 2018-08-21 | End: 2018-08-21

## 2018-08-21 RX ORDER — INSULIN GLARGINE 100 [IU]/ML
22 INJECTION, SOLUTION SUBCUTANEOUS EVERY MORNING
Status: DISCONTINUED | OUTPATIENT
Start: 2018-08-21 | End: 2018-08-24 | Stop reason: HOSPADM

## 2018-08-21 RX ORDER — INSULIN GLARGINE 100 [IU]/ML
18 INJECTION, SOLUTION SUBCUTANEOUS
Status: DISCONTINUED | OUTPATIENT
Start: 2018-08-21 | End: 2018-08-24 | Stop reason: HOSPADM

## 2018-08-21 RX ORDER — FUROSEMIDE 40 MG/1
40 TABLET ORAL DAILY
Status: DISCONTINUED | OUTPATIENT
Start: 2018-08-22 | End: 2018-08-24 | Stop reason: HOSPADM

## 2018-08-21 RX ORDER — FENTANYL CITRATE 50 UG/ML
INJECTION, SOLUTION INTRAMUSCULAR; INTRAVENOUS CODE/TRAUMA/SEDATION MEDICATION
Status: COMPLETED | OUTPATIENT
Start: 2018-08-21 | End: 2018-08-21

## 2018-08-21 RX ADMIN — FLUTICASONE PROPIONATE 2 PUFF: 110 AEROSOL, METERED RESPIRATORY (INHALATION) at 22:06

## 2018-08-21 RX ADMIN — FERROUS SULFATE TAB 325 MG (65 MG ELEMENTAL FE) 325 MG: 325 (65 FE) TAB at 11:49

## 2018-08-21 RX ADMIN — INSULIN LISPRO 8 UNITS: 100 INJECTION, SOLUTION INTRAVENOUS; SUBCUTANEOUS at 17:43

## 2018-08-21 RX ADMIN — FENTANYL CITRATE 25 MCG: 50 INJECTION, SOLUTION INTRAMUSCULAR; INTRAVENOUS at 10:08

## 2018-08-21 RX ADMIN — DOCUSATE SODIUM 100 MG: 100 CAPSULE, LIQUID FILLED ORAL at 11:59

## 2018-08-21 RX ADMIN — MIDAZOLAM 0.5 MG: 1 INJECTION INTRAMUSCULAR; INTRAVENOUS at 10:08

## 2018-08-21 RX ADMIN — LEVETIRACETAM 750 MG: 750 TABLET ORAL at 11:50

## 2018-08-21 RX ADMIN — POLYETHYLENE GLYCOL 3350 17 G: 17 POWDER, FOR SOLUTION ORAL at 11:48

## 2018-08-21 RX ADMIN — AMLODIPINE BESYLATE 10 MG: 10 TABLET ORAL at 11:49

## 2018-08-21 RX ADMIN — OXYCODONE HYDROCHLORIDE 5 MG: 5 TABLET ORAL at 23:50

## 2018-08-21 RX ADMIN — LEVOTHYROXINE SODIUM 100 MCG: 100 TABLET ORAL at 05:52

## 2018-08-21 RX ADMIN — DOCUSATE SODIUM 100 MG: 100 CAPSULE, LIQUID FILLED ORAL at 17:34

## 2018-08-21 RX ADMIN — GABAPENTIN 100 MG: 100 CAPSULE ORAL at 11:48

## 2018-08-21 RX ADMIN — ASPIRIN 81 MG 81 MG: 81 TABLET ORAL at 11:50

## 2018-08-21 RX ADMIN — ACETAMINOPHEN 975 MG: 325 TABLET, FILM COATED ORAL at 15:06

## 2018-08-21 RX ADMIN — CEFAZOLIN SODIUM 1000 MG: 1 SOLUTION INTRAVENOUS at 11:48

## 2018-08-21 RX ADMIN — METOPROLOL TARTRATE 12.5 MG: 25 TABLET ORAL at 22:11

## 2018-08-21 RX ADMIN — Medication 400 MG: at 17:34

## 2018-08-21 RX ADMIN — ACETAMINOPHEN 975 MG: 325 TABLET, FILM COATED ORAL at 05:52

## 2018-08-21 RX ADMIN — INSULIN LISPRO 2 UNITS: 100 INJECTION, SOLUTION INTRAVENOUS; SUBCUTANEOUS at 11:58

## 2018-08-21 RX ADMIN — CEFAZOLIN SODIUM 1000 MG: 1 SOLUTION INTRAVENOUS at 23:51

## 2018-08-21 RX ADMIN — MIDAZOLAM 0.5 MG: 1 INJECTION INTRAMUSCULAR; INTRAVENOUS at 09:35

## 2018-08-21 RX ADMIN — INSULIN LISPRO 8 UNITS: 100 INJECTION, SOLUTION INTRAVENOUS; SUBCUTANEOUS at 22:10

## 2018-08-21 RX ADMIN — POLYETHYLENE GLYCOL 3350 17 G: 17 POWDER, FOR SOLUTION ORAL at 17:33

## 2018-08-21 RX ADMIN — MIDAZOLAM 0.5 MG: 1 INJECTION INTRAMUSCULAR; INTRAVENOUS at 08:59

## 2018-08-21 RX ADMIN — FUROSEMIDE 40 MG: 10 INJECTION, SOLUTION INTRAMUSCULAR; INTRAVENOUS at 11:48

## 2018-08-21 RX ADMIN — FENTANYL CITRATE 25 MCG: 50 INJECTION, SOLUTION INTRAMUSCULAR; INTRAVENOUS at 09:35

## 2018-08-21 RX ADMIN — IODIXANOL 80 ML: 320 INJECTION, SOLUTION INTRAVASCULAR at 10:36

## 2018-08-21 RX ADMIN — METHYLPREDNISOLONE 32 MG: 16 TABLET ORAL at 07:25

## 2018-08-21 RX ADMIN — ATORVASTATIN CALCIUM 80 MG: 80 TABLET, FILM COATED ORAL at 11:50

## 2018-08-21 RX ADMIN — LORATADINE 10 MG: 10 TABLET ORAL at 11:49

## 2018-08-21 RX ADMIN — CEFAZOLIN SODIUM 1000 MG: 1 SOLUTION INTRAVENOUS at 01:18

## 2018-08-21 RX ADMIN — FLUTICASONE PROPIONATE 1 SPRAY: 50 SPRAY, METERED NASAL at 11:59

## 2018-08-21 RX ADMIN — GABAPENTIN 100 MG: 100 CAPSULE ORAL at 17:34

## 2018-08-21 RX ADMIN — INSULIN GLARGINE 22 UNITS: 100 INJECTION, SOLUTION SUBCUTANEOUS at 12:04

## 2018-08-21 RX ADMIN — Medication 1000 MG: at 15:07

## 2018-08-21 RX ADMIN — INSULIN GLARGINE 18 UNITS: 100 INJECTION, SOLUTION SUBCUTANEOUS at 22:10

## 2018-08-21 RX ADMIN — LEVETIRACETAM 750 MG: 750 TABLET ORAL at 22:10

## 2018-08-21 RX ADMIN — MIDAZOLAM 1 MG: 1 INJECTION INTRAMUSCULAR; INTRAVENOUS at 08:50

## 2018-08-21 RX ADMIN — PANTOPRAZOLE SODIUM 20 MG: 20 TABLET, DELAYED RELEASE ORAL at 05:52

## 2018-08-21 RX ADMIN — CEFAZOLIN SODIUM 1000 MG: 1 SOLUTION INTRAVENOUS at 17:33

## 2018-08-21 RX ADMIN — DIPHENHYDRAMINE HCL 50 MG: 25 TABLET ORAL at 07:37

## 2018-08-21 RX ADMIN — FENTANYL CITRATE 50 MCG: 50 INJECTION, SOLUTION INTRAMUSCULAR; INTRAVENOUS at 08:50

## 2018-08-21 RX ADMIN — METOPROLOL TARTRATE 12.5 MG: 25 TABLET ORAL at 11:48

## 2018-08-21 RX ADMIN — Medication 400 MG: at 11:51

## 2018-08-21 RX ADMIN — AMIODARONE HYDROCHLORIDE 200 MG: 200 TABLET ORAL at 07:25

## 2018-08-21 RX ADMIN — ACETAMINOPHEN 975 MG: 325 TABLET, FILM COATED ORAL at 22:07

## 2018-08-21 RX ADMIN — FENTANYL CITRATE 25 MCG: 50 INJECTION, SOLUTION INTRAMUSCULAR; INTRAVENOUS at 08:59

## 2018-08-21 RX ADMIN — FLUTICASONE PROPIONATE 2 PUFF: 110 AEROSOL, METERED RESPIRATORY (INHALATION) at 11:59

## 2018-08-21 NOTE — CASE MANAGEMENT
Continued Stay Review    Date: 08/21/2018    Vital Signs: /71   Pulse 56   Temp 98 4 °F (36 9 °C) (Oral)   Resp 18   Ht 5' 4" (1 626 m)   Wt 73 1 kg (161 lb 1 6 oz)   LMP  (LMP Unknown)   SpO2 98%   BMI 27 65 kg/m²     Medications:   Scheduled Meds:   Current Facility-Administered Medications:  acetaminophen 975 mg Oral Q8H [address] Max Jean-Baptiste, DO    albuterol 1 puff Inhalation BID PRN Lucy Lugo, DO    amiodarone 200 mg Oral Daily With Breakfast Lucy Lugo, DO    amLODIPine 10 mg Oral Daily Adriel Dyer, DO    aspirin 81 mg Oral Daily Lucy Lugo, DO    atorvastatin 80 mg Oral Daily Lucy Lugo, DO    calcium carbonate 1,000 mg Oral TID Lucy Lugo, DO    cefazolin 1,000 mg Intravenous Q8H Moris Macias MD Last Rate: Stopped (08/21/18 0138)   docusate sodium 100 mg Oral BID Adriel Dyer, DO    ferrous sulfate 325 mg Oral Every Other Day Lauryn Ybarra, DO    fluticasone 1 spray Nasal Daily Lucy Lugo, DO    fluticasone 2 puff Inhalation Q12H [address] Lucy Lugo, DO    furosemide 40 mg Intravenous BID (diuretic) Thompson Ahumada, MD    gabapentin 100 mg Oral BID Lauryn Ybarra,     insulin glargine 18 Units Subcutaneous HS Lauryn Ybarra DO    insulin glargine 22 Units Subcutaneous QAM Lauryn Ybarra DO    insulin lispro 2-12 Units Subcutaneous 4x Daily (AC & HS) Lauryn Ybarra DO    levETIRAcetam 750 mg Oral Q12H [address] Lucy Lugo, DO    levothyroxine 100 mcg Oral Early Morning Lucy Lugo, DO    loratadine 10 mg Oral Daily Lucy Lugo, DO    magnesium oxide 400 mg Oral BID Lucy Lugo, DO    metoclopramide 5 mg Oral Q6H PRN Lucy Lugo, DO    metoprolol tartrate 12 5 mg Oral Q12H [address] Lucy Lugo, DO    nitroglycerin 0 4 mg Sublingual Q3 min PRN Lucy Lugo, DO    oxyCODONE 5 mg Oral Q4H PRN Adriel Dyer,     pantoprazole 20 mg Oral Early Morning Lucy Lugo,     polyethylene glycol 17 g Oral BID Adriel Dyer,       Abnormal Labs/Diagnostic Results: Age/Sex: [de-identified] y o  female     Assessment/Plan: 54, 0535  Principal Problem:    Cellulitis  Active Problems:    Essential hypertension    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 native heart without angina pectoris    Type 2 diabetes mellitus with complication, with long-term current use of insulin ([facility])    Atrial flutter ([facility])    Hyperlipidemia    Gastroesophageal reflux disease without esophagitis    Chronic combined systolic and diastolic congestive heart failure ([facility])    Paroxysmal atrial fibrillation ([facility])    Ulcer of toe of left foot ([facility])    Ulcer of toe of right foot ([facility] [address] )    History of CVA (cerebrovascular accident)    Seiz[facility] )    Anemia    PAD (peripheral artery disease) ([facility])    CKD (chronic kidney disease) stage 3, GFR 30-59 ml/min     1  B/L Toe Wounds with 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 2/2 to severe PAD s/p IR RLE angioplasties of R distal SFA, mid R peroneal artery  - podiatry-local wound care to bilateral feet, no surgical plan for right foot at present     - vascular-LLE angiogram today with poor reconstructive options  Plan for local wound care with pain control vs amputation  This needs to be discussed with son  - continue cefazolin per ID  - continue pain control - oxycodone, tylenol, gapabentin per acute Pain service     2  Combined Systolic and Diastolic Heart Failure   - TAVR placed 4/18  -ROBB on 4/3/4311 showed systolic function was moderately to markedly reduced  Ejection fraction was estimated to be 35 % with severe pulmonary hypertension  - appears euvolemic on exam today  - Transition to home oral diuretic regimen   - ACE-inhibitor on hold with possible procedure  - Appreciate cardiology recommendations      3  CAD status post CABG   - continue ASA 81 mg, Atorvastatin 80 mg, Metoprolol 12 5 mg     4  Paroxysmal Atrial Fibrillation - NSR  - rates well controlled  - Amiodarone 200 mg, lopressor  - Xarelto 20 mg daily on hold for possible procedure     5  Type 2 DM  - Metformin and Lantus at home  - Hold Metformin  - Lantus 22 units q a m  and 18 units qhs with sliding scale      6  GERD   - continue pantoprazole 20 mg      7  Microcytic Anemia   - Hgb stable   -Ferrous sulfate every other day      8  Asthma   - Albuterol prn  - Fluticasone 2 puffs q12 hours      9  Seizure Disorder   - continue keppra 750 mg q12 hours      10  Hypothyroidism   - levothyroxine 100 mcg daily     11  HTN   - cont  amlodipine to 10mg daily, lopressor   - Ace inhibitor on hold prior to possible procedure     Disposition:  Per SOD, awaiting management plan per vascular      -------------------------------------------------------------------------------------------------------------------------------------------    08/21/2018 Progress Notes Vascular Surgery  Assessment:  [age] [facility] y/o F w/ PAD, dry gangrene on b/l 1st toes, L 2nd toe, R 3rd toe, who p/w L foot edema and erythema   --8/10: IR RLE angiogram--angioplasties of R distal SFA, mid R peroneal artery      Plan:  --NPO  --Repeat LLE angiogram today w/ IR  Findings:   1  Left lower extremity angiography with persistent three-vessel disease below the knee  2  Since 2017, new eccentric filling defect in the left common femoral artery, with persistent flow in the  [employer]  superficial femoral and popliteal arteries  3  No endovascular intervention performed    4  Right groin access, proglide closure  Anesthesia: Conscious sedation    --Contrast prep given (IV contrast allergy)  --Hold Xarelto for angiogram

## 2018-08-21 NOTE — PROGRESS NOTES
Progress Note - Vascular Surgery   Vilma Kearney [de-identified] y o  female MRN: [id number]  Unit/Bed#: -01 Encounter: [id number]    Assessment:  [de-identified] y/o F w/ PAD, dry gangrene on b/l 1st toes, L 2nd toe, R 3rd toe, who p/w L foot edema and erythema   --8/10: IR RLE angiogram--angioplasties of R distal SFA, mid R peroneal artery     Plan:  --NPO  --Repeat LLE angiogram today w/ IR  --Contrast prep given (IV contrast allergy)  --Hold Xarelto for angiogram    Subjective/Objective      Subjective:     No acute events overnight  Pt doing well this AM, denies any complaints  Objective:     Blood pressure 164/72, pulse 57, temperature 98 9 °F (37 2 °C), temperature source Oral, resp  rate 18, height 5' 4" (1 626 m), weight 73 1 kg (161 lb 1 6 oz), SpO2 91 %, not currently breastfeeding  ,Body mass index is 27 65 kg/m²  Intake/Output Summary (Last 24 hours) at 08/21/18 0420  Last data filed at 08/20/18 1715   Gross per 24 hour   Intake             1400 ml   Output             1400 ml   Net                0 ml       Invasive Devices     Peripheral Intravenous Line            Peripheral IV 08/18/18 Right Forearm 2 days                Physical Exam:     GEN: NAD  HEENT: MMM  CV: RRR  Lung: normal effort  Ab: Soft, NT/ND  Extrem: No CCE  Neuro:  A+Ox3, motor and sensation grossly intact  Pulse exam: b/l palp fem, nonpalp b/l DP/PT, doppl R PT, nondoppl L DP/PT      Lab, Imaging and other studies:  CBC: No results found for: WBC, HGB, HCT, MCV, PLT, ADJUSTEDWBC, MCH, MCHC, RDW, MPV, NRBC, CMP: No results found for: NA, K, CL, CO2, ANIONGAP, BUN, CREATININE, GLUCOSE, CALCIUM, AST, ALT, ALKPHOS, PROT, ALBUMIN, BILITOT, EGFR, Coagulation:   Lab Results   Component Value Date    INR 1 18 (H) 08/20/2018   , Urinalysis: No results found for: COLORU, CLARITYU, SPECGRAV, PHUR, LEUKOCYTESUR, NITRITE, PROTEINUA, GLUCOSEU, KETONESU, BILIRUBINUR, BLOODU, Amylase: No results found for: AMYLASE, Lipase: No results found for: LIPASE

## 2018-08-21 NOTE — PROGRESS NOTES
IM Residency Progress Note   Unit/Bed#: -01 Encounter: [id number]  SOD Team C       Nash Guzman [de-identified] y o  female [id number]    Hospital Stay Days: 12      Assessment/Plan:    Principal Problem:    Cellulitis  Active Problems:    Essential hypertension    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 native heart without angina pectoris    Type 2 diabetes mellitus with complication, with long-term current use of insulin ([facility])    Atrial flutter (HCC)    Hyperlipidemia    Gastroesophageal reflux disease without esophagitis    Chronic combined systolic and diastolic congestive heart failure ([facility])    Paroxysmal atrial fibrillation ([facility])    Ulcer of toe of left foot (HCC)    Ulcer of toe of right foot ([facility])    History of CVA (cerebrovascular accident)    Seizure ([facility] [address] )    Anemia    PAD (peripheral artery disease) ([facility])    CKD (chronic kidney disease) stage 3, GFR 30-59 ml/min    1  B/L Toe Wounds with 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 2/2 to severe PAD s/p IR RLE angioplasties of R distal SFA, mid R peroneal artery  - podiatry-local wound care to bilateral feet, no surgical plan for right foot at present     - vascular-LLE angiogram today with poor reconstructive options  Plan for local wound care with pain control vs amputation  This needs to be discussed with son  - continue cefazolin per ID  - continue pain control - oxycodone, tylenol, gapabentin per acute Pain service     2  Combined Systolic and Diastolic Heart Failure   - TAVR placed 4/18  -ROBB on 5/1/5244 showed systolic function was moderately to markedly reduced  Ejection fraction was estimated to be 35 % with severe pulmonary hypertension  - appears euvolemic on exam today  - Transition to home oral diuretic regimen   - ACE-inhibitor on hold with possible procedure  - Appreciate cardiology recommendations      3  CAD status post CABG   - continue ASA 81 mg, Atorvastatin 80 mg, Metoprolol 12 5 mg     4   Paroxysmal Atrial Fibrillation - NSR  - rates well controlled  - Amiodarone 200 mg, lopressor  - Xarelto 20 mg daily on hold for possible procedure     5  Type 2 DM  - Metformin and Lantus at home  - Hold Metformin  - Lantus 22 units q a m  and 18 units qhs with sliding scale      6  GERD   - continue pantoprazole 20 mg      7  Microcytic Anemia   - Hgb stable   -Ferrous sulfate every other day      8  Asthma   - Albuterol prn  - Fluticasone 2 puffs q12 hours      9  Seizure Disorder   - continue keppra 750 mg q12 hours      10  Hypothyroidism   - levothyroxine 100 mcg daily     11  HTN   - cont  amlodipine to 10mg daily, lopressor   - Ace inhibitor on hold prior to possible procedure    Disposition:  Per SOD, awaiting management plan per vascular  Subjective:   Patient seen and examined at bedside  Patient is disgruntled with procedure as she does not understand why the procedure was performed on the right and cannot be performed in the left leg  She also is requesting that the vascular team speak with son  Vitals: Temp (24hrs), Av 6 °F (37 °C), Min:98 4 °F (36 9 °C), Max:98 9 °F (37 2 °C)  Current: Temperature: 98 4 °F (36 9 °C)  Vitals:    18 1024 18 1030 18 1148 18 1149   BP: 141/65 158/71 (!) 0/0 158/70   BP Location:       Pulse: 56 56 (!) 0    Resp: 18 18     Temp:       TempSrc:       SpO2: 97% 98%     Weight:       Height:        Body mass index is 27 65 kg/m²  I/O last 24 hours: In: 041 [P O :825; I V :30; IV Piggyback:50]  Out: 1600 [Urine:1600]      Physical Exam:   Physical Exam   Constitutional: She is oriented to person, place, and time  She appears well-developed and well-nourished  No distress  HENT:   Head: Normocephalic and atraumatic  Mouth/Throat: No oropharyngeal exudate  Eyes: Conjunctivae and EOM are normal  Pupils are equal, round, and reactive to light  Cardiovascular: Normal rate, regular rhythm and normal heart sounds  No murmur heard    Pulmonary/Chest: Effort normal and breath sounds normal  No respiratory distress  She has no wheezes  She has no rales  Abdominal: Soft  Bowel sounds are normal  She exhibits no distension  There is no tenderness  There is no guarding  Musculoskeletal: She exhibits tenderness and deformity (b/l foot ulcerations )  She exhibits no edema  Neurological: She is alert and oriented to person, place, and time  Skin: Skin is warm and dry  She is not diaphoretic  Psychiatric: She has a normal mood and affect   Her behavior is normal  Judgment and thought content normal        Invasive Devices     Peripheral Intravenous Line            Peripheral IV 08/18/18 Right Forearm 3 days                          Labs: Labs personally reviewed    Radiology Results: Imaging personally reviewed    Active Meds:   Current Facility-Administered Medications   Medication Dose Route Frequency    acetaminophen (TYLENOL) tablet 975 mg  975 mg Oral Q8H [facility]    albuterol (PROVENTIL HFA,VENTOLIN HFA) inhaler 1 puff  1 puff Inhalation BID PRN    amiodarone tablet 200 mg  200 mg Oral Daily With Breakfast    amLODIPine (NORVASC) tablet 10 mg  10 mg Oral Daily    aspirin chewable tablet 81 mg  81 mg Oral Daily    atorvastatin (LIPITOR) tablet 80 mg  80 mg Oral Daily    calcium carbonate (TUMS) chewable tablet 1,000 mg  1,000 mg Oral TID    ceFAZolin (ANCEF) IVPB (premix) 1,000 mg  1,000 mg Intravenous Q8H    docusate sodium (COLACE) capsule 100 mg  100 mg Oral BID    ferrous sulfate tablet 325 mg  325 mg Oral Every Other Day    fluticasone (FLONASE) 50 mcg/act nasal spray 1 spray  1 spray Nasal Daily    fluticasone (FLOVENT HFA) 110 MCG/ACT inhaler 2 puff  2 puff Inhalation Q12H [facility]    [START ON 8/22/2018] furosemide (LASIX) tablet 40 mg  40 mg Oral Daily    gabapentin (NEURONTIN) capsule 100 mg  100 mg Oral BID    insulin glargine (LANTUS) subcutaneous injection 18 Units 0 18 mL  18 Units Subcutaneous HS    insulin glargine (LANTUS) subcutaneous injection 22 Units 0 22 mL  22 Units Subcutaneous QAM    insulin lispro (HumaLOG) 100 units/mL subcutaneous injection 2-12 Units  2-12 Units Subcutaneous 4x Daily (AC & HS)    levETIRAcetam (KEPPRA) tablet 750 mg  750 mg Oral Q12H [address]    levothyroxine tablet 100 mcg  100 mcg Oral Early Morning    loratadine (CLARITIN) tablet 10 mg  10 mg Oral Daily    magnesium oxide (MAG-OX) tablet 400 mg  400 mg Oral BID    metoclopramide (REGLAN) tablet 5 mg  5 mg Oral Q6H PRN    metoprolol tartrate (LOPRESSOR) partial tablet 12 5 mg  12 5 mg Oral Q12H [address]    nitroglycerin (NITROSTAT) SL tablet 0 4 mg  0 4 mg Sublingual Q3 min PRN    oxyCODONE (ROXICODONE) IR tablet 5 mg  5 mg Oral Q4H PRN    pantoprazole (PROTONIX) EC tablet 20 mg  20 mg Oral Early Morning    polyethylene glycol (MIRALAX) packet 17 g  17 g Oral BID         VTE Pharmacologic Prophylaxis: Reason for no pharmacologic prophylaxis Pre op   VTE Mechanical Prophylaxis: Bilateral SCDs    Ethan Yanes DO

## 2018-08-21 NOTE — PROGRESS NOTES
Progress Note - Inpatient Pain Management    Mary Anne Martínez [de-identified] y o  female MRN: [id number]  Unit/Bed#: -01 Encounter: [id number]      Assessment:   Principal Problem:    Cellulitis  Active Problems:    Essential hypertension    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 native heart without angina pectoris    Type 2 diabetes mellitus with complication, with long-term current use of insulin (HCC)    Atrial flutter (HCC)    Hyperlipidemia    Gastroesophageal reflux disease without esophagitis    Chronic combined systolic and diastolic congestive heart failure (HCC)    Paroxysmal atrial fibrillation (HCC)    Ulcer of toe of left foot (HCC)    Ulcer of toe of right foot (HCC)    History of CVA (cerebrovascular accident)    Seizure ([address] )    Anemia    PAD (peripheral artery disease) ([facility])    CKD (chronic kidney disease) stage 3, GFR 30-59 ml/min      Plan/Recommendations:   Acute left foot pain/ischemic pain  · Acetaminophen 975mg Q8 hours   · Gabapentin 100mg BID (increased 8/20/2018)  · Oxycodone 5mg Q4 hours PRN breakthrough pain    No recommendations for adjustments to pain regimen today  Reviewed with SOD-C    Pain History  Current pain location(s): left foot   Pain Scale:   0-7  Severity: severe at times  24 hour history: Patient resting in bed s/p a-gram this morning  Patient reports she is comfortable at this time  Tolerating medication adjustments        Current Analgesic regimen:  Tylenol 975mg Q8 hours, Gabapentin 100mg BID, Oxycodone 5mg Q4 hours PRN (2 doses)  Bowel Regimen: Colace BID, Miralax BID     Meds/Allergies   all current active meds have been reviewed and current meds:   Current Facility-Administered Medications   Medication Dose Route Frequency    acetaminophen (TYLENOL) tablet 975 mg  975 mg Oral Q8H [facility]    albuterol (PROVENTIL HFA,VENTOLIN HFA) inhaler 1 puff  1 puff Inhalation BID PRN    amiodarone tablet 200 mg  200 mg Oral Daily With Breakfast    amLODIPine (NORVASC) tablet 10 mg  10 mg Oral Daily    aspirin chewable tablet 81 mg  81 mg Oral Daily    atorvastatin (LIPITOR) tablet 80 mg  80 mg Oral Daily    calcium carbonate (TUMS) chewable tablet 1,000 mg  1,000 mg Oral TID    ceFAZolin (ANCEF) IVPB (premix) 1,000 mg  1,000 mg Intravenous Q8H    docusate sodium (COLACE) capsule 100 mg  100 mg Oral BID    ferrous sulfate tablet 325 mg  325 mg Oral Every Other Day    fluticasone (FLONASE) 50 mcg/act nasal spray 1 spray  1 spray Nasal Daily    fluticasone (FLOVENT HFA) 110 MCG/ACT inhaler 2 puff  2 puff Inhalation Q12H [address]    [START ON 8/22/2018] furosemide (LASIX) tablet 40 mg  40 mg Oral Daily    gabapentin (NEURONTIN) capsule 100 mg  100 mg Oral BID    insulin glargine (LANTUS) subcutaneous injection 18 Units 0 18 mL  18 Units Subcutaneous HS    insulin glargine (LANTUS) subcutaneous injection 22 Units 0 22 mL  22 Units Subcutaneous QAM    insulin lispro (HumaLOG) 100 units/mL subcutaneous injection 2-12 Units  2-12 Units Subcutaneous 4x Daily (AC & HS)    levETIRAcetam (KEPPRA) tablet 750 mg  750 mg Oral Q12H [address]    levothyroxine tablet 100 mcg  100 mcg Oral Early Morning    loratadine (CLARITIN) tablet 10 mg  10 mg Oral Daily    magnesium oxide (MAG-OX) tablet 400 mg  400 mg Oral BID    metoclopramide (REGLAN) tablet 5 mg  5 mg Oral Q6H PRN    metoprolol tartrate (LOPRESSOR) partial tablet 12 5 mg  12 5 mg Oral Q12H [address]    nitroglycerin (NITROSTAT) SL tablet 0 4 mg  0 4 mg Sublingual Q3 min PRN    oxyCODONE (ROXICODONE) IR tablet 5 mg  5 mg Oral Q4H PRN    pantoprazole (PROTONIX) EC tablet 20 mg  20 mg Oral Early Morning    polyethylene glycol (MIRALAX) packet 17 g  17 g Oral BID       Allergies   Allergen Reactions    Other Chest Pain     IVP-listed as "chest pain" in previous chart, patient stated this occurred "a long time ago" but does not remember exactly occurred     Cephalosporins Chest Pain    Contrast [Iodinated Diagnostic Agents] Other (See Comments)     Flash pulm edema    Doxycycline Chest Pain    Ketorolac Other (See Comments)     Chest pain     Levaquin [Levofloxacin] Chest Pain    Ondansetron Chest Pain     Prolonged QT    Toradol [Ketorolac Tromethamine] Chest Pain       Objective     Temp:  [98 4 °F (36 9 °C)-98 9 °F (37 2 °C)] 98 4 °F (36 9 °C)  HR:  [0-71] 0  Resp:  [18-20] 18  BP: (0-190)/(0-83) 158/70      Intake/Output Summary (Last 24 hours) at 08/21/18 1427  Last data filed at 08/21/18 0745   Gross per 24 hour   Intake              305 ml   Output              800 ml   Net             -495 ml                Physical Exam: /70   Pulse (!) 0   Temp 98 4 °F (36 9 °C) (Oral)   Resp 18   Ht 5' 4" (1 626 m)   Wt 73 1 kg (161 lb 1 6 oz)   LMP  (LMP Unknown)   SpO2 98%   BMI 27 65 kg/m²     General Appearance:    Alert, cooperative, no distress   Neurological:   Oriented to person, place, and time    Head:    Normocephalic, without obvious abnormality, atraumatic   Eyes:    EOM's intact   Lungs:     Respirations unlabored   Chest Wall:    No tenderness or deformity   Skin:   Skin color pale     Lab Results:     Results from last 7 days  Lab Units 08/21/18  0500   WBC Thousand/uL 10 93*   HEMOGLOBIN g/dL 9 9*   HEMATOCRIT % 31 9*   PLATELETS Thousands/uL 292        Results from last 7 days  Lab Units 08/21/18  0500   SODIUM mmol/L 132*   POTASSIUM mmol/L 4 3   CHLORIDE mmol/L 99*   CO2 mmol/L 28   BUN mg/dL 16   CREATININE mg/dL 0 97   CALCIUM mg/dL 8 6   GLUCOSE RANDOM mg/dL 205*       Counseling / Coordination of Care  Total floor / unit time spent today 15 minutes  Greater than 50% of total time was spent with the patient and / or family counseling and / or coordination of care   A description of the counseling / coordination of care: Reviewed plan of care and medications with patient, RN staff and primary care team     Anjel Sotomayor MS, RN-BC  Acute Pain

## 2018-08-21 NOTE — INTERVAL H&P NOTE
Update:    Patient arrives to IR for diagnostic angiogram of LLE and possible intervention  She has pain and dry toe gangrene  Discussed that this may be difficult due to her anatomy, and there are risks of vessel damage and distal embolization  She wishes to proceed  Patient re-evaluated   Accept as history and physical     Anibal Marcelo MD/August 21, 2018/8:38 AM

## 2018-08-21 NOTE — PROGRESS NOTES
Progress Note - Infectious Disease   Salvador Shah [de-identified] y o  female MRN: [id number]  Unit/Bed#: -01 Encounter: [id number]      Impression/Recommendations:  1   Chronic bilateral foot ischemic ulcers with possible left foot cellulitis  Consider chronic osteomyelitis of right 3rd toe  Positive wound cultures likely represent wound colonizers  Clinically stable without sepsis  Rec:  ? Continue cefazolin for now  ? Continue [address] per podiatry  ? Await final decision from patient re:palliative [address] versus BKA  ? Follow temperatures closely  ? Supportive care as per the primary service     2   PAD  Status PTA on right  No revascularizatino options on left  Rec:  ? Close vascular surgery follow-up  ? Await final plan from patient (see above)     3  CAD with ICM  High risk for surgery per cardiology  Rec:  ? Close cardiology follow-up ongoing     The patient is stable from an ID standpoint      Antibiotics:  Cefazolin #5    Subjective:  Patient seen on PM rounds  Denies fevers, chills, sweats, nausea, vomiting, or diarrhea  24 Hour Events:  Underwent left lower extremity arteriogram with no targets for intervention  Vascular surgery has discussed palliative wound care versus amputation and she is pending decision with her son  No documented fevers, chills, sweats, nausea, vomiting, or diarrhea  Objective:  Vitals:  HR:  [0-71] 0  Resp:  [18-20] 18  BP: (0-190)/(0-83) 158/70  SpO2:  [91 %-100 %] 98 %  Temp (24hrs), Av 6 °F (37 °C), Min:98 4 °F (36 9 °C), Max:98 9 °F (37 2 °C)  Current: Temperature: 98 4 °F (36 9 °C)    Physical Exam:   General:  No acute distress  Psychiatric:  Awake and alert  Pulmonary:  Normal respiratory excursion without accessory muscle use  Abdomen:  Soft, nontender  Extremities:  Non pitting edema left leg  Cool ischemic rubor left hallux and dorsal foot with more purpuric changes of the hallux  Skin:  No rashes    Lab Results:  I have personally reviewed pertinent labs      Results from last 7 days  Lab Units 08/21/18  0500 08/18/18  0545 08/16/18  0607   SODIUM mmol/L 132* 138 139   POTASSIUM mmol/L 4 3 4 3 4 2   CHLORIDE mmol/L 99* 102 105   CO2 mmol/L 28 27 29   ANION GAP mmol/L 5 9 5   BUN mg/dL 16 13 17   CREATININE mg/dL 0 97 0 78 0 76   EGFR ml/min/1 73sq m 55 72 74   GLUCOSE RANDOM mg/dL 205* 84 66   CALCIUM mg/dL 8 6 8 9 8 5       Results from last 7 days  Lab Units 08/21/18  0500 08/18/18  1409 08/16/18  0607   WBC Thousand/uL 10 93* 10 53* 12 12*   HEMOGLOBIN g/dL 9 9* 9 1* 9 8*   PLATELETS Thousands/uL 292 229 244           Imaging Studies:   I have personally reviewed pertinent imaging study reports and images in PACS  EKG, Pathology, and Other Studies:   I have personally reviewed pertinent reports

## 2018-08-21 NOTE — BRIEF OP NOTE (RAD/CATH)
Procedure name not found  Procedure Note    PATIENT NAME: Mejia Fox  : 1940  MRN: [id number]     Pre-op Diagnosis:   1  Cellulitis    2  Atherosclerosis of native artery of right lower extremity with ulceration of other part of foot ([address] )    3  Ulcer of toe of left foot, unspecified ulcer stage ([address] )    4  Ulcer of toe of right foot, unspecified ulcer stage ([address] )    5  Atherosclerosis of native artery of both lower extremities with bilateral ulceration, unspecified ulceration site ([address] )    6  PAD (peripheral artery disease) ([facility])      Post-op Diagnosis:   1  Cellulitis    2  Atherosclerosis of native artery of right lower extremity with ulceration of other part of foot ([address] )    3  Ulcer of toe of left foot, unspecified ulcer stage ([address] )    4  Ulcer of toe of right foot, unspecified ulcer stage ([address] )    5  Atherosclerosis of native artery of both lower extremities with bilateral ulceration, unspecified ulceration site ([address] )    6  PAD (peripheral artery disease) ([address] )        Surgeon:   Teri Vera MD  Assistants:     Jignesh Mata    Estimated Blood Loss: none  Findings:   1  Left lower extremity angiography with persistent three-vessel disease below the knee  2  Since 2017, new eccentric filling defect in the left common femoral artery, with persistent flow in the  [address]  superficial femoral and popliteal arteries  3  No endovascular intervention performed    4  Right groin access, proglide closure    Specimens: none    Complications:  none    Anesthesia: Conscious sedation    Teri Vera MD     Date: 2018  Time: 10:53 AM

## 2018-08-21 NOTE — PROGRESS NOTES
Cardiology Progress Note - Vilma Kearney [de-identified] y o  female MRN: [id number]    Unit/Bed#: -01 Encounter: [id number]      Assessment:  Principal Problem:    Cellulitis  Active Problems:    Essential hypertension    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 native heart without angina   Stable pectoris    Type 2 diabetes mellitus with complication, with long-term current use of insulin (HCC)    Atrial flutter (HCC)   Remains in sinus rhythm, Xarelto held for surgical intervention, on amiodarone for rhythm control    Hyperlipidemia    Gastroesophageal reflux disease without esophagitis    Chronic combined systolic and diastolic congestive heart failure (HCC)   - LVEF 35% by last echo  - Euvolemic, sodium slightly low at 132 in the setting of diuresis  Renal function stable    Paroxysmal atrial fibrillation (HCC)   As above    Ulcer of toe of left foot (HCC)    Ulcer of toe of right foot (HCC)    History of CVA (cerebrovascular accident)    Seizure ([facility] [address] )    Anemia    PAD (peripheral artery disease) ([facility])    CKD (chronic kidney disease) stage 3, GFR 30-59 ml/min      Plan:    Discontinue IV Lasix  Start oral Lasix at home dose tomorrow 40 mg daily  Follow clinical exam, weight is etc to ensure stability on home dosing  Restart Xarelto when okay with surgery    Subjective:   Patient seen and examined  No new complaints  Had angiogram today, no intervention  Denies SOB or CP or palpitations      Meds/Allergies   Allergies   Allergen Reactions    Other Chest Pain     IVP-listed as "chest pain" in previous chart, patient stated this occurred "a long time ago" but does not remember exactly occurred     Cephalosporins Chest Pain    Contrast [Iodinated Diagnostic Agents] Other (See Comments)     Flash pulm edema    Doxycycline Chest Pain    Ketorolac Other (See Comments)     Chest pain     Levaquin [Levofloxacin] Chest Pain    Ondansetron Chest Pain     Prolonged QT    Toradol [Ketorolac Tromethamine] Chest Pain       Current Facility-Administered Medications:     acetaminophen (TYLENOL) tablet 975 mg, 975 mg, Oral, Q8H [address], Max Estiven, DO, 975 mg at 08/21/18 0552    albuterol (PROVENTIL HFA,VENTOLIN HFA) inhaler 1 puff, 1 puff, Inhalation, BID PRN, Chrisa Taylor, DO, 1 puff at 08/13/18 2119    amiodarone tablet 200 mg, 200 mg, Oral, Daily With Breakfast, Leotha Taylor, DO, 200 mg at 08/21/18 0725    amLODIPine (NORVASC) tablet 10 mg, 10 mg, Oral, Daily, Robin Code, DO, 10 mg at 08/21/18 1149    aspirin chewable tablet 81 mg, 81 mg, Oral, Daily, Leotha Taylor, DO, 81 mg at 08/21/18 1150    atorvastatin (LIPITOR) tablet 80 mg, 80 mg, Oral, Daily, Leotha Taylor, DO, 80 mg at 08/21/18 1150    calcium carbonate (TUMS) chewable tablet 1,000 mg, 1,000 mg, Oral, TID, Leotha Taylor, DO, Stopped at 08/20/18 0900    ceFAZolin (ANCEF) IVPB (premix) 1,000 mg, 1,000 mg, Intravenous, Q8H, Joan Lujan MD, Last Rate: 100 mL/hr at 08/21/18 1148, 1,000 mg at 08/21/18 1148    docusate sodium (COLACE) capsule 100 mg, 100 mg, Oral, BID, Robin Code, DO, 100 mg at 08/21/18 1159    ferrous sulfate tablet 325 mg, 325 mg, Oral, Every Other Day, Morna Sue, DO, 325 mg at 08/21/18 1149    fluticasone (FLONASE) 50 mcg/act nasal spray 1 spray, 1 spray, Nasal, Daily, Leotha Taylor, DO, 1 spray at 08/21/18 1159    fluticasone (FLOVENT HFA) 110 MCG/ACT inhaler 2 puff, 2 puff, Inhalation, Q12H [address], Leotha Taylor, DO, 2 puff at 08/21/18 1159    furosemide (LASIX) injection 40 mg, 40 mg, Intravenous, BID (diuretic), Dilia Key MD, 40 mg at 08/21/18 1148    gabapentin (NEURONTIN) capsule 100 mg, 100 mg, Oral, BID, Torres Rogers DO, 100 mg at 08/21/18 1148    insulin glargine (LANTUS) subcutaneous injection 18 Units 0 18 mL, 18 Units, Subcutaneous, HS, Torres Rogers DO    insulin glargine (LANTUS) subcutaneous injection 22 Units 0 22 mL, 22 Units, Subcutaneous, QAM, Torres Rogers DO, 22 Units at 08/21/18 1204    insulin lispro (HumaLOG) 100 units/mL subcutaneous injection 2-12 Units, 2-12 Units, Subcutaneous, 4x Daily (AC & HS), 2 Units at 08/21/18 1158 **AND** Fingerstick Glucose (POCT), , , TID ACTorreso,     levETIRAcetam (KEPPRA) tablet 750 mg, 750 mg, Oral, Q12H [facility], [facility], DO, 750 mg at 08/21/18 1150    levothyroxine tablet 100 mcg, 100 mcg, Oral, Early Morning, [facility], DO, 100 mcg at 08/21/18 0321    loratadine (CLARITIN) tablet 10 mg, 10 mg, Oral, Daily, [facility] [place], DO, 10 mg at 08/21/18 1149    magnesium oxide (MAG-OX) tablet 400 mg, 400 mg, Oral, BID, [facility], DO, 400 mg at 08/21/18 1151    metoclopramide (REGLAN) tablet 5 mg, 5 mg, Oral, Q6H PRN, [facility], DO, 5 mg at 08/12/18 0727    metoprolol tartrate (LOPRESSOR) partial tablet 12 5 mg, 12 5 mg, Oral, Q12H [facility], [facility] [place], DO, 12 5 mg at 08/21/18 1148    nitroglycerin (NITROSTAT) SL tablet 0 4 mg, 0 4 mg, Sublingual, Q3 min PRN, [facility], DO    oxyCODONE (ROXICODONE) IR tablet 5 mg, 5 mg, Oral, Q4H PRN, Robin Carter, DO, 5 mg at 08/20/18 2133    pantoprazole (PROTONIX) EC tablet 20 mg, 20 mg, Oral, Early Morning, [facility] [place], DO, 20 mg at 08/21/18 0552    polyethylene glycol (MIRALAX) packet 17 g, 17 g, Oral, BID, Max Jean-Baptiste, DO, 17 g at 08/21/18 1148    Objective   Vitals: Blood pressure 158/70, pulse (!) 0, temperature 98 4 °F (36 9 °C), temperature source Oral, resp   rate 18, height 5' 4" (1 626 m), weight 73 1 kg (161 lb 1 6 oz), SpO2 98 %, not currently breastfeeding , Body mass index is 27 65 kg/m² ,   Orthostatic Blood Pressures      Most Recent Value   Blood Pressure  158/70 filed at 08/21/2018 1149   Patient Position - Orthostatic VS  Lying filed at 08/21/2018 3506        Systolic (36SQG), MJE:142 , Min:0 , UOC:108     Diastolic (73PNP), TAP:00, Min:0, Max:83      Intake/Output Summary (Last 24 hours) at 08/21/18 1403  Last data filed at 08/21/18 0704   Gross per 24 hour   Intake              305 ml   Output              800 ml   Net             -495 ml     Weight (last 2 days)     None            Physical Exam:    GEN: Kp Shea appears well, but mildly lethargic and in NAD  NECK: supple, no JVD  HEART: rrr, 2/6 SM  LUNGS:CTAB, no rales  ABDOMEN: soft, nt  EXTREMITIES:  Reduced pedal pulses, feet bandaged        Laboratory Results:        CBC with diff:     Results from last 7 days  Lab Units 08/21/18  0500 08/18/18  1409 08/16/18  0607 08/15/18  0540   WBC Thousand/uL 10 93* 10 53* 12 12* 10 38*   HEMOGLOBIN g/dL 9 9* 9 1* 9 8* 9 3*   HEMATOCRIT % 31 9* 29 2* 32 0* 31 1*   MCV fL 76* 77* 78* 78*   PLATELETS Thousands/uL 292 229 244 220   MCH pg 23 6* 24 1* 24 0* 23 4*   MCHC g/dL 31 0* 31 2* 30 6* 29 9*   RDW % 17 4* 17 5* 18 0* 17 9*   MPV fL 10 3 10 1 10 1 10 5   NRBC AUTO /100 WBCs 0  --  0  --          CMP:    Results from last 7 days  Lab Units 08/21/18  0500 08/18/18  0545 08/16/18  0607 08/15/18  0540   SODIUM mmol/L 132* 138 139 138   POTASSIUM mmol/L 4 3 4 3 4 2 4 0   CHLORIDE mmol/L 99* 102 105 105   CO2 mmol/L 28 27 29 27   ANION GAP mmol/L 5 9 5 6   BUN mg/dL 16 13 17 12   CREATININE mg/dL 0 97 0 78 0 76 0 67   GLUCOSE RANDOM mg/dL 205* 84 66 75   CALCIUM mg/dL 8 6 8 9 8 5 8 1*   EGFR ml/min/1 73sq m 55 72 74 83         BMP:    Results from last 7 days  Lab Units 08/21/18  0500 08/18/18  0545 08/16/18  0607 08/15/18  0540   SODIUM mmol/L 132* 138 139 138   POTASSIUM mmol/L 4 3 4 3 4 2 4 0   CHLORIDE mmol/L 99* 102 105 105   CO2 mmol/L 28 27 29 27   BUN mg/dL 16 13 17 12   CREATININE mg/dL 0 97 0 78 0 76 0 67   GLUCOSE RANDOM mg/dL 205* 84 66 75   CALCIUM mg/dL 8 6 8 9 8 5 8 1*       Coags:     Results from last 7 days  Lab Units 08/20/18  0610 08/18/18  0545   PTT seconds  --  29   INR  1 18* 1 37*       Cardiac testing:   Results for orders placed during the hospital encounter of 04/28/18   Echo complete with contrast if indicated    [address]  [address] [facility], [address] [place] [address]  [phone]    Transthoracic Echocardiogram  2D, M-mode, Doppler, and Color Doppler    Study date:  01-May-2018    Patient: Eilene Brunner  MR number: [id number]  Account number: [de-identified]  : 1940  Age: [de-identified] years  Gender: Female  Status: Inpatient  Location: Bedside  Height: 64 in  Weight: 161 7 lb  BP: 171/ 83 mmHg    Indications: Aortic Valve Disorder Evaluate prosthetic aortic valve  Evaluate prosthetic mitral valve  Diagnoses: I35 9 - Nonrheumatic aortic valve disorder, unspecified    Sonographer:  Judith Weathers RDCS  Primary Physician:  Patricio Sim MD  Group:  [facility]  Interpreting Physician:  Afsaneh Palafox MD    SUMMARY    LEFT VENTRICLE:  The ventricle was mildly dilated  Systolic function was moderately to markedly reduced  Ejection fraction was estimated to be 35 %  There was moderate diffuse hypokinesis with distinct regional wall motion abnormalities  More severe hypokinesis to akinesis was seen in the inferoseptal and inferior walls  Wall thickness was mildly increased  VENTRICULAR SEPTUM:  There was moderate dyssynergic motion  These changes are consistent with LBBB  RIGHT VENTRICLE:  The ventricle was moderately dilated  Systolic function was mildly reduced  LEFT ATRIUM:  The atrium was moderately dilated  RIGHT ATRIUM:  The atrium was moderately dilated  MITRAL VALVE:  There was marked annular calcification  There was moderate diffuse thickening  There was moderately reduced leaflet separation  Transmitral velocity was increased due to valvular stenosis  There was moderate stenosis  There was mild regurgitation  AORTIC VALVE:  A bioprosthesis was present  It exhibited normal function  There was no significant perivalvular regurgitation  Valve mean gradient was 8 mmHg  TRICUSPID VALVE:  There was moderate regurgitation    Pulmonary artery systolic pressure was markedly increased  Estimated peak PA pressure was 70 mmHg  IVC, HEPATIC VEINS:  The inferior vena cava was dilated  HISTORY: PRIOR HISTORY: TAVR, Mitral Stenosis, CABG, MI, HTN, Asthma, CHF, DM, CVA, CAD, HLD    PROCEDURE: The procedure was performed at the bedside  This was a routine study  The transthoracic approach was used  The study included complete 2D imaging, M-mode, complete spectral Doppler, and color Doppler  The heart rate was 100 bpm,  at the start of the study  Images were obtained from the parasternal, apical, subcostal, and suprasternal notch acoustic windows  Echocardiographic views were limited due to restricted patient mobility, poor patient compliance, and lung  interference  This was a technically difficult study  LEFT VENTRICLE: The ventricle was mildly dilated  Systolic function was moderately to markedly reduced  Ejection fraction was estimated to be 35 %  There was moderate diffuse hypokinesis with distinct regional wall motion abnormalities  More severe hypokinesis to akinesis was seen in the inferoseptal and inferior walls  Wall thickness was mildly increased  DOPPLER: The study was not technically sufficient to allow evaluation of LV diastolic function  VENTRICULAR SEPTUM: Thickness was mildly increased  There was moderate dyssynergic motion  These changes are consistent with LBBB  RIGHT VENTRICLE: The ventricle was moderately dilated  Systolic function was mildly reduced  Wall thickness was normal     LEFT ATRIUM: The atrium was moderately dilated  RIGHT ATRIUM: The atrium was moderately dilated  MITRAL VALVE: There was marked annular calcification  There was moderate diffuse thickening  There was moderately reduced leaflet separation  DOPPLER: Transmitral velocity was increased due to valvular stenosis  There was moderate  stenosis  There was mild regurgitation  AORTIC VALVE: A bioprosthesis was present  It exhibited normal function   DOPPLER: Transaortic velocity was within the normal range  There was no evidence for stenosis  There was no significant regurgitation  There was no significant  perivalvular regurgitation  TRICUSPID VALVE: The valve structure was normal  DOPPLER: The transtricuspid velocity was within the normal range  There was moderate regurgitation  Pulmonary artery systolic pressure was markedly increased  Estimated peak PA pressure was  70 mmHg  PULMONIC VALVE: Not well visualized  DOPPLER: The transpulmonic velocity was within the normal range  There was no regurgitation  PERICARDIUM: There was no pericardial effusion  AORTA: The root exhibited normal size  The ascending aorta was normal in size  SYSTEMIC VEINS: IVC: The inferior vena cava was dilated  PULMONARY VEINS: DOPPLER: Doppler signals were not recordable in the pulmonary vein(s)  MEASUREMENT TABLES    DOPPLER MEASUREMENTS  Aortic valve   (Reference normals)  Mean gradient   8 mmHg   (--)    SYSTEM MEASUREMENT TABLES    2D  %FS: 15 07 %  Ao Diam: 2 75 cm  EDV(Teich): 139 25 ml  EF(Teich): 31 62 %  ESV(Teich): 95 22 ml  IVSd: 1 2 cm  LA Area: 23 57 cm2  LA Diam: 4 16 cm  LVEDV MOD A4C: 93 35 ml  LVEF MOD A4C: 41 28 %  LVESV MOD A4C: 54 81 ml  LVIDd: 5 37 cm  LVIDs: 4 56 cm  LVLd A4C: 7 64 cm  LVLs A4C: 6 64 cm  LVOT Diam: 1 9 cm  LVPWd: 1 21 cm  RA Area: 25 62 cm2  RVIDd: 5 25 cm  SV MOD A4C: 38 53 ml  SV(Teich): 44 03 ml    CW  AV Env  Ti: 318 34 ms  AV VTI: 47 91 cm  AV Vmax: 2 36 m/s  AV Vmean: 1 5 m/s  AV maxP 29 mmHg  AV meanPG: 10 48 mmHg  TR Vmax: 3 84 m/s  TR maxP 13 mmHg    MM  TAPSE: 1 4 cm    PW  CHER (VTI): 1 05 cm2  CHER Vmax: 1 03 cm2  E': 0 03 m/s  E/E': 81 41  HR: 173 4 BPM  LVOT Env  Ti: 332 18 ms  LVOT VTI: 17 78 cm  LVOT Vmax: 0 86 m/s  LVOT Vmean: 0 54 m/s  LVOT maxP 95 mmHg  LVOT meanP 37 mmHg  LVSI Dopp: 28 05 ml/m2  LVSV Dopp: 50 2 ml  MV A Jayant: 2 32 m/s  MV Dec [place]: 11 07 m/s2  MV DecT: 201 33 ms  MV E Jayant: 2 23 m/s  MV E/A Ratio: 0 96  MV PHT: 58 39 ms  MV VTI: 50 85 cm  MV Vmax: 2 29 m/s  MV Vmean: 1 49 m/s  MV maxP 89 mmHg  MV meanPG: 10 01 mmHg  MVA (VTI): 0 99 cm2  MVA By PHT: 3 77 cm2    [facility]    Prepared and electronically signed by    Jose Robles MD  Signed -[id number] 15:08:38       Results for orders placed during the hospital encounter of 18   ROBB    N[address]ative [address]  [address], [address] [facility] [address] [facility]  [phone]    Transesophageal Echocardiogram  2D, Doppler, and Color Doppler    Study date:  2018    Patient: Felipe Kennedy  MR number: [id number]  Account number: [de-identified]  : 1940  Age: [de-identified] years  Gender: Female  Status: Inpatient  Location: Echo lab  Height: 64 in  Weight: 167 lb  BP: 116/ 52 mmHg    Indications: CVA    Diagnoses: R55  - Syncope and collapse    Sonographer:  SIOBHAN Solorio  Primary Physician:  Dariela Garrett MD  Referring Physician:  Torsten Shipley MD  Group:  [facility]  Interpreting Physician:  Marlene Wilson MD    SUMMARY    LEFT VENTRICLE:  Systolic function was normal  Ejection fraction was estimated to be 55 %  Although no diagnostic regional wall motion abnormality was identified, this possibility cannot be completely excluded on the basis of this study  Wall thickness was increased  Concentric hypertrophy was present  LEFT ATRIAL APPENDAGE:  No thrombus was identified  ATRIAL SEPTUM:  The septum bows from left to right, consistent with increased left atrial pressure  No defect or patent foramen ovale was identified by color Doppler or after injection of agitated saline  MITRAL VALVE:  There was moderate annular calcification  There was mild to moderate stenosis  Vmax 1 7 m/s, mean gradient 5 6 mm Hg, maximum gradient 11 mm Hg at a HR of 78 BPM   There was mild regurgitation  AORTIC VALVE:  A bioprosthesis was present  It exhibited normal function  TRICUSPID VALVE:  There was moderate to severe regurgitation  Estimated peak PA pressure was 55 mmHg  The findings suggest moderate pulmonary hypertension  HISTORY: PRIOR HISTORY: TAVR, Mitral Stenosis , CABG, MI, Hypertension, Asthma, CHF    PROCEDURE: The procedure was performed in the echo lab  This was a routine study  The risks and alternatives of the procedure were explained to the patient and informed consent was obtained  The transesophageal approach was used  The study  included complete 2D imaging, limited spectral Doppler, and color Doppler  The heart rate was 74 bpm, at the start of the study  An adult omniplane probe was inserted by the attending cardiologist  Intubated with ease  One intubation  attempt(s)  There was no blood detected on the probe  Image quality was adequate  There were no complications during the procedure  MEDICATIONS: Anesthesia administered by anesthesia team     LEFT VENTRICLE: Size was normal  Systolic function was normal  Ejection fraction was estimated to be 55 %  Although no diagnostic regional wall motion abnormality was identified, this possibility cannot be completely excluded on the basis  of this study  Wall thickness was increased  Concentric hypertrophy was present  RIGHT VENTRICLE: The size was normal  Systolic function was normal     LEFT ATRIUM: The atrium was dilated  No thrombus was identified  APPENDAGE: The appendage was dilated  No thrombus was identified  DOPPLER: The function was reduced (reduced emptying velocity)  ATRIAL SEPTUM: The septum bows from left to right, consistent with increased left atrial pressure  No defect or patent foramen ovale was identified by color Doppler or after injection of agitated saline  MITRAL VALVE: There was moderate annular calcification  There was mild-moderate calcification  There was mildly reduced leaflet separation  DOPPLER: There was mild to moderate stenosis   Vmax 1 7 m/s, mean gradient 5 6 mm Hg, maximum  gradient 11 mm Hg at a HR of 78 BPM  There was mild regurgitation  AORTIC VALVE: A bioprosthesis was present  It exhibited normal function  DOPPLER: There was no evidence for stenosis  There was no significant regurgitation  TRICUSPID VALVE: The valve structure was normal  There was normal leaflet separation  There was no echocardiographic evidence of vegetation  DOPPLER: There was moderate to severe regurgitation  Estimated peak PA pressure was 55 mmHg  The  findings suggest moderate pulmonary hypertension  PULMONIC VALVE: Leaflets exhibited normal thickness, no calcification, and normal cuspal separation  There was no echocardiographic evidence of vegetation  PERICARDIUM: There was no pericardial effusion  The pericardium was normal in appearance  AORTA: The root exhibited normal size  There was no atheroma  There was no evidence for dissection  There was no evidence for aneurysm  [address] Accredited Echocardiography Laboratory    Prepared and electronically signed by    Marti Dominguez MD  Signed 2018 14:48:57       Results for orders placed during the hospital encounter of 03/15/18   Cardiac catheterization    Narrative [place] [address]  [address] [facility], [address] [place] [address]  [phone]    [facility]    Invasive Cardiovascular Lab Complete Report    Patient: Andrew Romeo  MR number: [id number]  Account number: [de-identified]  Study date: 2018  Gender: Female  : 1940  Height: 64 2 in  Weight: 163 7 lb  BSA: 1 8 m squared    Allergies: OTHER, CEPHALOSPORINS, IODINATED DIAGNOSTIC AGENTS, DOXYCYCLINE, LEVOFLOXACIN, ONDANSETRON, KETOROLAC TROMETHAMINE    Diagnostic Cardiologist:  Hermes Hector MD  Primary Physician:  Mady Sigala MD    SUMMARY    CORONARY CIRCULATION:  Mid LAD: There was a 90 % stenosis  1st diagonal: There was a 100 % stenosis at the ostium of the vessel segment    2nd diagonal: There was a 100 % stenosis at the ostium of the vessel segment  3rd diagonal: There was a 100 % stenosis at the ostium of the vessel segment  1st obtuse marginal: There was a 100 % stenosis at the ostium of the vessel segment  Proximal RCA: There was a 60 % stenosis  The lesion was hazy and ulcerated  It appears amenable to percutaneous intervention  Right PDA: There was a 50 % stenosis  Graft to the 1st diagonal: There was a discrete 50 % stenosis at the proximal anastomosis  It appears amenable to percutaneous intervention  Graft to the 1st obtuse marginal: There was a 100 % stenosis at the proximal anastomosis  Graft to the RPDA: There was a 100 % stenosis at the proximal anastomosis  INDICATIONS:  --  Possible CAD: myocardial infarction without ST elevation (NSTEMI)  PROCEDURES PERFORMED    --  Left coronary angiography  --  Right coronary angiography  --  Saphenous vein graft angiography  --  LIMA graft angiography  --  Inpatient  --  Mod Sedation Same Physician Initial 15min  --  Mod Sedation Same Physician Add 15min  --  Mod Sedation Same Physician Add 15min  --  Mod Sedation Same Physician Add 15min  --  Coronary Catheterization (w/o LHC, w/ Grafts  PROCEDURE: The risks and alternatives of the procedures and conscious sedation were explained to the patient and informed consent was obtained  The patient was brought to the cath lab and placed on the table  The planned puncture sites  were prepped and draped in the usual sterile fashion  --  Right femoral artery access  The puncture site was infiltrated with local anesthetic  The vessel was accessed using the modified Seldinger technique, a wire was advanced into the vessel, and a sheath was advanced over the wire into the  vessel  --  Left coronary artery angiography  A catheter was advanced over a guidewire into the aorta and positioned in the left coronary artery ostium under fluoroscopic guidance  Angiography was performed      -- Right coronary artery angiography  A catheter was advanced over a guidewire into the aorta and positioned in the right coronary artery ostium under fluoroscopic guidance  Angiography was performed  --  Saphenous vein graft angiography  A catheter was advanced to the aorta and positioned at the aortic anastomosis of the graft over a guidewire under fluoroscopic guidance  Angiography was performed  --  Left internal mammary graft angiography  A catheter was advanced to the aorta and positioned in the left subclavian artery over a guidewire under fluoroscopic guidance  Angiography was performed  --  Inpatient  --  Mod Sedation Same Physician Initial 15min  --  Mod Sedation Same Physician Add 15min  --  Mod Sedation Same Physician Add 15min  --  Mod Sedation Same Physician Add 15min  --  Coronary Catheterization (w/o LHC, w/ Grafts  PROCEDURE COMPLETION: The patient tolerated the procedure well and was discharged from the cath lab  TIMING: Test started at 12:57  Test concluded at 13:55  HEMOSTASIS: The sheath was removed  The site was compressed manually  Hemostasis  was obtained  MEDICATIONS GIVEN: Benadryl (50mg/ml), 50 mg, IV, at 12:54  Hydrocortisone 100mg/ml, 100 mg, IV, at 12:54  Fentanyl (1OOmcg/2 ml), 50 mcg, IV, at 12:55  Versed (2mg/2ml), 1 mg, IV, at 12:55  1% Lidocaine, 10 ml,  subcutaneously, at 12:58  Versed (2mg/2ml), 0 5 mg, IV, at 13:00  Fentanyl (1OOmcg/2 ml), 25 mcg, IV, at 13:00  Versed (2mg/2ml), 0 5 mg, IV, at 13:30  Fentanyl (1OOmcg/2 ml), 25 mcg, IV, at 13:30  Versed (2mg/2ml), 1 mg, IV, at 13:35  Fentanyl (1OOmcg/2 ml), 50 mcg, IV, at 13:35  CONTRAST GIVEN: 100 ml Omnipaque (350mg I /ml)  100 ml Omnipaque (350mg I /ml)  RADIATION EXPOSURE: Fluoroscopy time: 26 75 min  CORONARY VESSELS:   --  The coronary circulation is right dominant  --  Left main: The vessel was medium to large sized  Angiography showed minor luminal irregularities    --  Mid LAD: There was a 90 % stenosis  --  Distal LAD: The vessel was medium to large sized  The artery was supplied by a patent bypass graft  --  1st diagonal: There was a 100 % stenosis at the ostium of the vessel segment  --  2nd diagonal: The vessel was small to medium sized and mildly ectatic  Angiography showed moderate atherosclerosis  There was a 100 % stenosis at the ostium of the vessel segment  --  3rd diagonal: The vessel was small sized  The artery was supplied by a patent bypass graft  There was a 100 % stenosis at the ostium of the vessel segment  --  1st obtuse marginal: There was a 100 % stenosis at the ostium of the vessel segment  --  Proximal RCA: There was a 60 % stenosis  The lesion was hazy and ulcerated  It appears amenable to percutaneous intervention  --  Right PDA: There was a 50 % stenosis  --  Graft to the distal LAD: The graft was a small to medium sized LIMA  --  Graft to the 1st diagonal: The graft was a medium to large sized saphenous vein graft from the aorta  Graft angiography showed moderate atherosclerosis and mild degeneration  There was a discrete 50 % stenosis at the proximal  anastomosis  It appears amenable to percutaneous intervention  --  Graft to the 2nd diagonal: The graft was a medium to large sized saphenous vein graft from first diagonal   --  Graft to the 3rd diagonal: The graft was a saphenous vein graft from second diagonal   --  Graft to the 1st obtuse marginal: The graft was a saphenous vein graft from the aorta  There was a 100 % stenosis at the proximal anastomosis  --  Graft to the RPDA: The graft was a saphenous vein graft from the aorta  There was a 100 % stenosis at the proximal anastomosis  IMPRESSIONS:  There is significant triple vessel coronary artery disease  Patent LIMA-LAD, patent SVG D1-2-3/sequential(mild degeneration)  occluded(OLD) SVG om1 and SVG PDA    RECOMMENDATIONS  The patient should continue with the present medications    Consultation be obtained for aortic valve replacement  Prepared and signed by    Charito Espinal MD  Signed 2018 14:06:04    Study diagram    Angiographic findings  Native coronary lesions:  ·Mid LAD: Lesion 1: 90 % stenosis  ·D1: Lesion 1: 100 % stenosis  ·D2: Lesion 1: 100 % stenosis  ·D3: Lesion 1: 100 % stenosis  ·OM1: Lesion 1: 100 % stenosis  ·Proximal RCA: Lesion 1: 60 % stenosis  ·RPDA: Lesion 1: 50 % stenosis  Coronary graft lesions:  ·Graft to D1: SVG  · 50 % stenosis at proximal anastomosis, discrete  ·Graft to OM1: SVG  · 100 % stenosis at proximal anastomosis  ·Graft to RPDA: SVG  · 100 % stenosis at proximal anastomosis  Hemodynamic tables    Pressures:  Baseline  Pressures:  - HR: 78  Pressures:  - Rhythm:  Pressures:  -- Aortic Pressure (S/D/M): 145/71/100    Outputs:  Baseline  Outputs:  -- CALCULATIONS: [age]  Outputs:  -- CALCULATIONS: Body Surface Area: 1 80  Outputs:  -- CALCULATIONS: Height in cm: 163 00  Outputs:  -- CALCULATIONS: Sex: Female  Outputs:  -- CALCULATIONS: Weight in k 40             Yassine Castillo MD    Portions of the record may have been created with voice recognition software   Occasional wrong word or "sound a like" substitutions may have occurred due to the inherent limitations of voice recognition software   Read the chart carefully and recognize, using context, where substitutions have occurred

## 2018-08-21 NOTE — H&P (VIEW-ONLY)
Consultation - Infectious Disease   Mary Anne Martínez [de-identified] y o  female MRN: [id number]  Unit/Bed#: -01 Encounter: [id number]      IMPRESSION & RECOMMENDATIONS:   Impression/Recommendations:  1  Chronic bilateral foot ischemic ulcers  No clinical evidence of active infection or cellulitis  Consider chronic osteomyelitis of right 3rd toe  Erythema around left hallux more consistent with ischemic rubor  Positive wound cultures likely represent wound colonizers  Clinically stable without sepsis  Rec:  · Discontinue antibiotics and follow closely  · Continue [address] per podiatry  · Await final surgical plan from podiatry  · Follow temperatures closely  · Supportive care as per the primary service    2  PAD  Status PTA  Rec:  · Close vascular surgery follow-up    Antibiotics:  Cefazolin # 6  Antibiotics # 8    Thank you for this consultation  We will follow along with you  HISTORY OF PRESENT ILLNESS:  Reason for Consult: Cellulitis    HPI: Mary Anne Martínez is a [de-identified] y o  female with multiple medical problems as listed below including diabetes and PAD  Patient has a history of chronic bilateral foot ischemic wounds being followed followed closely by the wound [facility]  She presented to the emergency department on 08/08 complaining of increased pain and erythema of the left foot  Upon presentation she was noted to be afebrile with a normal WBC  Labs revealed a normal WBC  She was started empirically on  She was seen by vascular surgery who recommended arteriogram   She underwent this on 08/10 and had PTA  She has been seen by Podiatry who performed localized debridement   They are recommending TMA versus BKA we are asked to comment on further evaluation and management  REVIEW OF SYSTEMS:  Patient complains of bilateral foot pain  Denies fevers, chills, sweats, nausea, vomiting, or diarrhea  A complete system-based review of systems is otherwise negative      PAST MEDICAL HISTORY:  Past Medical History:   Diagnosis Date    Altered mental status     Anemia     Anticoagulated     Xarelto for Afib/ flutter    Asthma     Atrial flutter (HCC)     maintained on anticoag w/ Xarelto    Bradycardia     CAD (coronary artery disease)     Candidiasis     Carotid stenosis, bilateral     Cataract     Chest pain     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) (HCC)     Chronic fatigue syndrome     Chronic low back pain     Chronic ulcer of toes (HCC)     left and right    Concussion w loss of consciousness of unsp duration, init     CVA [facility]cere[facility] ) 4/17/2018    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, insulin dependent    DJD (degenerative joint disease)     Expressive aphasia     Foot pain     GERD (gastroesophageal reflux disease)     Herpes zoster     HLD (hyperlipidemia)     HTN (hypertension)     Hypothyroidism     Irritable bowel syndrome     Kidney stone     Lumbar radiculopathy     Lyme disease     Dx in hospital 8/2011    MI (myocardial infarction) [facility] )     Migraines     Muscle spasm     Non-neoplastic nevus     Nontoxic multinodular goiter     NSVT (nonsustained ventricular tachycardia) (HCC)     Osteoarthritis     Other chronic pain     PAD (peripheral artery disease) (HCC)     Palpitations     Paroxysmal atrial fibrillation (HCC)     Pseudogout     Pulmonary hypertension (HCC)     S/P TAVR (transcatheter aortic valve replacement)     Seizures (HCC)     Severe sepsis ([facility] )     Spinal stenosis     Stroke [facility] 05/2018    Transient cerebral ischemia     Trigger ring finger     Viral gastroenteritis      Past Surgical History:   Procedure Laterality Date    APPENDECTOMY      ARTERIOGRAM  12/19/2017    CARDIAC CATHETERIZATION      CHOLECYSTECTOMY      COLONOSCOPY      CORONARY ARTERY BYPASS GRAFT  2004    IR ABDOMINAL ANGIOGRAPHY / INTERVENTION  8/10/2018    LUMBAR LAMINECTOMY      RI ECHO TRANSESOPHAG R-T 2D W/PRB IMG ACQUISJ I&R N/A 4/3/2018    Procedure: TRANSESOPHAGEAL ECHOCARDIOGRAM (ROBB); Surgeon: Vanita Auguste DO;  [facility];  Service: Cardiac Surgery    SD REPLACE AORTIC VALVE OPENFEMORAL ARTERY APPROACH N/A 4/3/2018    Procedure: REPLACEMENT AORTIC VALVE TRANSCATHETER (TAVR) TRANSFEMORAL w/ 26MM IVY YEVGENIY S3 VALVE;  Surgeon: Vanita Auguste DO;  [facility];  Service: Cardiac Surgery    THYROIDECTOMY      TOTAL ABDOMINAL HYSTERECTOMY W/ BILATERAL SALPINGOOPHORECTOMY         FAMILY HISTORY:  Non-contributory    SOCIAL HISTORY:  History   Alcohol Use No     History   Drug Use No     History   Smoking Status    Never Smoker   Smokeless Tobacco    Never Used       ALLERGIES:  Allergies   Allergen Reactions    Other Chest Pain     IVP-listed as "chest pain" in previous chart, patient stated this occurred "a long time ago" but does not remember exactly occurred     Cephalosporins Chest Pain    Contrast [Iodinated Diagnostic Agents] Other (See Comments)     Flash pulm edema    Doxycycline Chest Pain    Ketorolac Other (See Comments)     Chest pain     Levaquin [Levofloxacin] Chest Pain    Ondansetron Chest Pain     Prolonged QT    Toradol [Ketorolac Tromethamine] Chest Pain       MEDICATIONS:  All current active medications have been reviewed      PHYSICAL EXAM:  Vitals:  HR:  [55-58] 55  Resp:  [20] 20  BP: (148-158)/(63-77) 151/66  SpO2:  [94 %-97 %] 97 %  Temp (24hrs), Av 5 °F (36 9 °C), Min:98 3 °F (36 8 °C), Max:98 7 °F (37 1 °C)  Current: Temperature: 98 3 °F (36 8 °C)     Physical Exam:  General:  Well-nourished, well-developed, in no acute distress  Eyes:  Conjunctive clear with no hemorrhages or effusions  Oropharynx:  No ulcers, no lesions  Neck:  Supple, no lymphadenopathy  Lungs:  Clear to auscultation bilaterally, no accessory muscle use  Cardiac:  Regular rate and rhythm, no murmurs  Abdomen:  Soft, non-tender, non-distended  Extremities:  No peripheral cyanosis, clubbing, or edema  Skin:  No rashes, no ulcers  Neurological:  Moves all four extremities spontaneously, sensation grossly intact  Left foot:  Dry ulcer left hallux with surrounding cool tender erythema which improves with leg elevation  Dry gangrene right middle toe  Wrap intact to foot  Pictures reviewed which showed clean based ulcerations over lateral and medial aspects of metatarsal region without purulence or cellulitis  LABS, IMAGING, & OTHER STUDIES:  Lab Results:  I have personally reviewed pertinent labs  Results from last 7 days  Lab Units 08/15/18  0540 08/13/18  0531 08/12/18  0541  08/08/18  1641   SODIUM mmol/L 138 137 139  < > 136   POTASSIUM mmol/L 4 0 3 7 4 1  < > 4 3   CHLORIDE mmol/L 105 104 106  < > 101   CO2 mmol/L 27 27 28  < > 26   ANION GAP mmol/L 6 6 5  < > 9   BUN mg/dL 12 12 14  < > 23   CREATININE mg/dL 0 67 0 68 0 73  < > 1 15   EGFR ml/min/1 73sq m 83 83 78  < > 45   GLUCOSE RANDOM mg/dL 75 81 82  < > 135   CALCIUM mg/dL 8 1* 8 1* 8 0*  < > 8 8   AST U/L  --   --   --   --  22   ALT U/L  --   --   --   --  27   ALK PHOS U/L  --   --   --   --  58   TOTAL PROTEIN g/dL  --   --   --   --  8 3*   BILIRUBIN TOTAL mg/dL  --   --   --   --  0 55   < > = values in this interval not displayed  Results from last 7 days  Lab Units 08/15/18  0540 08/14/18  0530 08/13/18  0531   WBC Thousand/uL 10 38* 10 27* 10 12   HEMOGLOBIN g/dL 9 3* 10 1* 10 0*   PLATELETS Thousands/uL 220 232 239       Results from last 7 days  Lab Units 08/12/18  1225 08/08/18  1641   BLOOD CULTURE   --  No Growth After 5 Days  No Growth After 5 Days     GRAM STAIN RESULT  No polys seen  1+ Gram positive cocci in clusters  No polys seen  3+ Gram positive cocci in clusters  --    WOUND CULTURE  2+ Growth of Klebsiella oxytoca*  2+ Growth of Staphylococcus aureus*  2+ Growth of Citrobacter amalonaticus complex*  2+ Growth of Staphylococcus aureus*  --          Imaging Studies:   I have personally reviewed pertinent imaging study reports and images in PACS  Right foot x-ray osteomyelitis distal 3rd phalanx  Left foot x-ray no osseous abnormality    EKG, Pathology, and Other Studies:   I have personally reviewed pertinent reports

## 2018-08-22 LAB
ANION GAP SERPL CALCULATED.3IONS-SCNC: 7 MMOL/L (ref 4–13)
BUN SERPL-MCNC: 27 MG/DL (ref 5–25)
CALCIUM SERPL-MCNC: 8.5 MG/DL (ref 8.3–10.1)
CHLORIDE SERPL-SCNC: 98 MMOL/L (ref 100–108)
CO2 SERPL-SCNC: 29 MMOL/L (ref 21–32)
CREAT SERPL-MCNC: 0.99 MG/DL (ref 0.6–1.3)
GFR SERPL CREATININE-BSD FRML MDRD: 54 ML/MIN/1.73SQ M
GLUCOSE SERPL-MCNC: 157 MG/DL (ref 65–140)
GLUCOSE SERPL-MCNC: 182 MG/DL (ref 65–140)
GLUCOSE SERPL-MCNC: 200 MG/DL (ref 65–140)
GLUCOSE SERPL-MCNC: 216 MG/DL (ref 65–140)
GLUCOSE SERPL-MCNC: 266 MG/DL (ref 65–140)
POTASSIUM SERPL-SCNC: 3.7 MMOL/L (ref 3.5–5.3)
SODIUM SERPL-SCNC: 134 MMOL/L (ref 136–145)

## 2018-08-22 PROCEDURE — 82948 REAGENT STRIP/BLOOD GLUCOSE: CPT

## 2018-08-22 PROCEDURE — 97530 THERAPEUTIC ACTIVITIES: CPT

## 2018-08-22 PROCEDURE — 99233 SBSQ HOSP IP/OBS HIGH 50: CPT | Performed by: SURGERY

## 2018-08-22 PROCEDURE — 97110 THERAPEUTIC EXERCISES: CPT

## 2018-08-22 PROCEDURE — 80048 BASIC METABOLIC PNL TOTAL CA: CPT | Performed by: INTERNAL MEDICINE

## 2018-08-22 PROCEDURE — 99232 SBSQ HOSP IP/OBS MODERATE 35: CPT | Performed by: INTERNAL MEDICINE

## 2018-08-22 PROCEDURE — 99233 SBSQ HOSP IP/OBS HIGH 50: CPT | Performed by: INTERNAL MEDICINE

## 2018-08-22 PROCEDURE — 97535 SELF CARE MNGMENT TRAINING: CPT

## 2018-08-22 RX ORDER — POTASSIUM CHLORIDE 20 MEQ/1
40 TABLET, EXTENDED RELEASE ORAL ONCE
Status: COMPLETED | OUTPATIENT
Start: 2018-08-22 | End: 2018-08-22

## 2018-08-22 RX ADMIN — ATORVASTATIN CALCIUM 80 MG: 80 TABLET, FILM COATED ORAL at 09:20

## 2018-08-22 RX ADMIN — FERROUS SULFATE TAB 325 MG (65 MG ELEMENTAL FE) 325 MG: 325 (65 FE) TAB at 09:22

## 2018-08-22 RX ADMIN — GABAPENTIN 100 MG: 100 CAPSULE ORAL at 19:01

## 2018-08-22 RX ADMIN — FLUTICASONE PROPIONATE 2 PUFF: 110 AEROSOL, METERED RESPIRATORY (INHALATION) at 09:24

## 2018-08-22 RX ADMIN — INSULIN LISPRO 4 UNITS: 100 INJECTION, SOLUTION INTRAVENOUS; SUBCUTANEOUS at 21:46

## 2018-08-22 RX ADMIN — ACETAMINOPHEN 975 MG: 325 TABLET, FILM COATED ORAL at 13:38

## 2018-08-22 RX ADMIN — ACETAMINOPHEN 975 MG: 325 TABLET, FILM COATED ORAL at 06:21

## 2018-08-22 RX ADMIN — POTASSIUM CHLORIDE 40 MEQ: 1500 TABLET, EXTENDED RELEASE ORAL at 09:29

## 2018-08-22 RX ADMIN — ALBUTEROL SULFATE 1 PUFF: 90 AEROSOL, METERED RESPIRATORY (INHALATION) at 21:56

## 2018-08-22 RX ADMIN — FLUTICASONE PROPIONATE 2 PUFF: 110 AEROSOL, METERED RESPIRATORY (INHALATION) at 23:01

## 2018-08-22 RX ADMIN — PANTOPRAZOLE SODIUM 20 MG: 20 TABLET, DELAYED RELEASE ORAL at 06:21

## 2018-08-22 RX ADMIN — OXYCODONE HYDROCHLORIDE 5 MG: 5 TABLET ORAL at 16:12

## 2018-08-22 RX ADMIN — GABAPENTIN 100 MG: 100 CAPSULE ORAL at 09:21

## 2018-08-22 RX ADMIN — Medication 400 MG: at 19:01

## 2018-08-22 RX ADMIN — INSULIN LISPRO 4 UNITS: 100 INJECTION, SOLUTION INTRAVENOUS; SUBCUTANEOUS at 17:41

## 2018-08-22 RX ADMIN — INSULIN LISPRO 6 UNITS: 100 INJECTION, SOLUTION INTRAVENOUS; SUBCUTANEOUS at 12:24

## 2018-08-22 RX ADMIN — INSULIN LISPRO 2 UNITS: 100 INJECTION, SOLUTION INTRAVENOUS; SUBCUTANEOUS at 07:39

## 2018-08-22 RX ADMIN — INSULIN GLARGINE 18 UNITS: 100 INJECTION, SOLUTION SUBCUTANEOUS at 21:42

## 2018-08-22 RX ADMIN — METOPROLOL TARTRATE 12.5 MG: 25 TABLET ORAL at 21:42

## 2018-08-22 RX ADMIN — OXYCODONE HYDROCHLORIDE 5 MG: 5 TABLET ORAL at 21:42

## 2018-08-22 RX ADMIN — INSULIN GLARGINE 22 UNITS: 100 INJECTION, SOLUTION SUBCUTANEOUS at 09:19

## 2018-08-22 RX ADMIN — CEFAZOLIN SODIUM 1000 MG: 1 SOLUTION INTRAVENOUS at 16:02

## 2018-08-22 RX ADMIN — AMLODIPINE BESYLATE 10 MG: 10 TABLET ORAL at 09:20

## 2018-08-22 RX ADMIN — AMIODARONE HYDROCHLORIDE 200 MG: 200 TABLET ORAL at 09:20

## 2018-08-22 RX ADMIN — ACETAMINOPHEN 975 MG: 325 TABLET, FILM COATED ORAL at 21:43

## 2018-08-22 RX ADMIN — METOPROLOL TARTRATE 12.5 MG: 25 TABLET ORAL at 09:20

## 2018-08-22 RX ADMIN — LORATADINE 10 MG: 10 TABLET ORAL at 09:20

## 2018-08-22 RX ADMIN — Medication 400 MG: at 09:25

## 2018-08-22 RX ADMIN — LEVETIRACETAM 750 MG: 750 TABLET ORAL at 21:43

## 2018-08-22 RX ADMIN — FUROSEMIDE 40 MG: 40 TABLET ORAL at 09:21

## 2018-08-22 RX ADMIN — ASPIRIN 81 MG 81 MG: 81 TABLET ORAL at 09:21

## 2018-08-22 RX ADMIN — LEVETIRACETAM 750 MG: 750 TABLET ORAL at 09:21

## 2018-08-22 RX ADMIN — DOCUSATE SODIUM 100 MG: 100 CAPSULE, LIQUID FILLED ORAL at 09:21

## 2018-08-22 RX ADMIN — FLUTICASONE PROPIONATE 1 SPRAY: 50 SPRAY, METERED NASAL at 09:24

## 2018-08-22 RX ADMIN — CEFAZOLIN SODIUM 1000 MG: 1 SOLUTION INTRAVENOUS at 09:19

## 2018-08-22 RX ADMIN — POLYETHYLENE GLYCOL 3350 17 G: 17 POWDER, FOR SOLUTION ORAL at 09:22

## 2018-08-22 RX ADMIN — LEVOTHYROXINE SODIUM 100 MCG: 100 TABLET ORAL at 06:20

## 2018-08-22 NOTE — PROGRESS NOTES
Progress Note - Infectious Disease   Gabrielle Davis [de-identified] y o  female MRN: [id number]  Unit/Bed#: -01 Encounter: [id number]      Impression/Recommendations:  1   Chronic bilateral foot ischemic ulcers with possible left foot cellulitis  Consider chronic osteomyelitis of right 3rd toe  Positive wound cultures likely represent wound colonizers  Clinically stable without sepsis  Rec:  ? Continue cefazolin for one more day  ? Continue [address] per podiatry  ? Await final decision from patient re:palliative [address] versus BKA  ? Follow temperatures closely  ? Supportive care as per the primary service     2   PAD  Status PTA on right  No revascularizatino options on left  Rec:  ? Close vascular surgery follow-up  ? Await final plan from patient (see above)     3   CAD with ICM  High risk for surgery per cardiology  Rec:  ? Close cardiology follow-up ongoing     The patient is stable from an ID standpoint      Antibiotics:  Cefazolin #6    Subjective:  Patient seen on AM rounds  Upset at the prospect that she may need amputation although is still undecided  Denies fevers, chills, sweats, nausea, vomiting, or diarrhea  24 Hour Events:  No documented fevers, chills, sweats, nausea, vomiting, or diarrhea  Objective:  Vitals:  HR:  [58-63] 58  Resp:  [16-18] 18  BP: (128-150)/(63-68) 150/68  SpO2:  [92 %-94 %] 94 %  Temp (24hrs), Av 3 °F (36 8 °C), Min:98 °F (36 7 °C), Max:98 5 °F (36 9 °C)  Current: Temperature: 98 °F (36 7 °C)    Physical Exam:   General:  No acute distress  Psychiatric:  Awake and alert  Pulmonary:  Normal respiratory excursion without accessory muscle use  Abdomen:  Soft, nontender  Extremities:  No edema, no visible cellulitis on calf  Skin:  No rashes    Lab Results:  I have personally reviewed pertinent labs      Results from last 7 days  Lab Units 18  0528 18  0500 18  0545   SODIUM mmol/L 134* 132* 138   POTASSIUM mmol/L 3 7 4 3 4 3   CHLORIDE mmol/L 98* 99* 102   CO2 mmol/L 29 28 27   ANION GAP mmol/L 7 5 9   BUN mg/dL 27* 16 13   CREATININE mg/dL 0 99 0 97 0 78   EGFR ml/min/1 73sq m 54 55 72   GLUCOSE RANDOM mg/dL 182* 205* 84   CALCIUM mg/dL 8 5 8 6 8 9       Results from last 7 days  Lab Units 08/21/18  0500 08/18/18  1409 08/16/18  0607   WBC Thousand/uL 10 93* 10 53* 12 12*   HEMOGLOBIN g/dL 9 9* 9 1* 9 8*   PLATELETS Thousands/uL 292 229 244           Imaging Studies:   I have personally reviewed pertinent imaging study reports and images in PACS  EKG, Pathology, and Other Studies:   I have personally reviewed pertinent reports

## 2018-08-22 NOTE — PHYSICIAN ADVISOR
Current patient class: Inpatient  The patient is currently on Hospital Day: 14      The patient was admitted to the hospital at 1200 on 8/9/18 for the following diagnosis:  Cellulitis [L03 [id number]]  Foot pain [M79 [id number]]     CMS OUTLIER STAY REVIEW    After review of the relevant documentation, labs, vital signs and test results, the patient is appropriate for CONTINUED INPATIENT ADMISSION  The patient continues to remain hospitalized receiving acute medical care  The patient has surpassed the expected duration of stay, however given the clinical condition, need for further acute care management, the patient is appropriate to remain in an inpatient status  The patient still being actively managed, and does have unresolved medical issues requiring further hospitalization  This review is conducted at 10 day intervals, to help satisfy the requirements for significant outlier stay review as per CMS  Given the current condition of this patient, the patient satisfies this review was determination for continued inpatient stay  Rationale is as follows:     The patient is a [de-identified] yrs old Female who presented to the ED at 8/8/2018  3:40 PM with a chief complaint of Foot Pain (pt comes from PCP with bilatal foot pain, venous ulcers present on both feet)    The patients vitals on arrival were ED Triage Vitals [08/08/18 1544]   Temperature Pulse Respirations Blood Pressure SpO2   97 8 °F (36 6 °C) 58 20 (!) 177/71 96 %      Temp Source Heart Rate Source Patient Position - Orthostatic VS BP Location FiO2 (%)   Oral Monitor Lying Right arm --      Pain Score       8           Past Medical History:   Diagnosis Date    Altered mental status     Anemia     Anticoagulated     Xarelto for Afib/ flutter    Asthma     Atrial flutter (HCC)     maintained on anticoag w/ Xarelto    Bradycardia     CAD (coronary artery disease)     Candidiasis     Carotid stenosis, bilateral     Cataract     Chest pain     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) (HCC)     Chronic fatigue syndrome     Chronic low back pain     Chronic ulcer of toes (HCC)     left and right    Concussion w loss of consciousness of unsp duration, init     [facility] 4/17/2018    [facility]     type 2, insulin dependent    DJD (degenerative joint disease)     Expressive aphasia     Foot pain     GERD (gastroesophageal reflux disease)     Herpes zoster     HLD (hyperlipidemia)     HTN (hypertension)     Hypothyroidism     Irritable bowel syndrome     Kidney stone     Lumbar radiculopathy     Lyme disease     Dx in hospital 8/2011    [facility]     Migraines     Muscle spasm     Non-neoplastic nevus     Nontoxic multinodular goiter     NSVT (nonsustained ventricular tachycardia) (HCC)     Osteoarthritis     Other chronic pain     PAD (peripheral artery disease) (HCC)     Palpitations     Paroxysmal atrial fibrillation (HCC)     Pseudogout     Pulmonary hypertension (HCC)     S/P TAVR (transcatheter aortic valve replacement)     [facility]     Severe sepsis [facility]     Spinal stenosis     Stroke [facility] ) 05/2018    Transient cerebral ischemia     Trigger ring finger     Viral gastroenteritis      Past Surgical History:   Procedure Laterality Date    APPENDECTOMY      ARTERIOGRAM  12/19/2017    CARDIAC CATHETERIZATION      CHOLECYSTECTOMY      COLONOSCOPY      CORONARY ARTERY BYPASS GRAFT  2004    IR ABDOMINAL ANGIOGRAPHY / INTERVENTION  8/10/2018    IR ABDOMINAL ANGIOGRAPHY / INTERVENTION  8/21/2018    LUMBAR LAMINECTOMY      UT ECHO TRANSESOPHAG R-T 2D W/PRB IMG ACQUISJ I&R N/A 4/3/2018    Procedure: TRANSESOPHAGEAL ECHOCARDIOGRAM (ROBB);   Surgeon: Osmin Sutton DO;  [facility];  Service: Cardiac Surgery    UT REPLACE AORTIC VALVE OPENFEMORAL ARTERY APPROACH N/A 4/3/2018    Procedure: REPLACEMENT AORTIC VALVE TRANSCATHETER (TAVR) TRANSFEMORAL w/ 26MM IVY YEVGENIY S3 VALVE;  Surgeon: Vanita Auguste DO;  [facility];  Service: Cardiac Surgery    THYROIDECTOMY      TOTAL ABDOMINAL HYSTERECTOMY W/ BILATERAL SALPINGOOPHORECTOMY             Consults have been placed to:   IP CONSULT TO WOUND CARE  IP CONSULT TO VASCULAR SURGERY  IP CONSULT TO PODIATRY  IP CONSULT TO INFECTIOUS DISEASES  IP CONSULT TO ACUTE PAIN SERVICE  IP CONSULT TO CARDIOLOGY    Vitals:    08/21/18 1030 08/21/18 1148 08/21/18 1149 08/21/18 1555   BP: 158/71 (!) 0/0 158/70 128/68   BP Location:    Left arm   Pulse: 56 (!) 0  58   Resp: 18   16   Temp:    98 5 °F (36 9 °C)   TempSrc:    Oral   SpO2: 98%   92%   Weight:       Height:           Most recent labs:    Recent Labs      08/20/18   0610  08/21/18   0500   WBC   --   10 93*   HGB   --   9 9*   HCT   --   31 9*   PLT   --   292   K   --   4 3   NA   --   132*   CALCIUM   --   8 6   BUN   --   16   CREATININE   --   0 97   INR  1 18*   --        Scheduled Meds:  Current Facility-Administered Medications:  acetaminophen 975 mg Oral Q8H [address] Max Jean-Baptiste,     albuterol 1 puff Inhalation BID PRN Renaee Deep, DO    amiodarone 200 mg Oral Daily With Breakfast Renaee Deep, DO    amLODIPine 10 mg Oral Daily Max Jean-Baptiste, DO    aspirin 81 mg Oral Daily Renaee Deep, DO    atorvastatin 80 mg Oral Daily Renaee Deep, DO    calcium carbonate 1,000 mg Oral TID Renaee Deep, DO    cefazolin 1,000 mg Intravenous Q8H Gualberto Martinez MD Last Rate: 1,000 mg (08/21/18 1733)   docusate sodium 100 mg Oral BID Hoa Downey,     ferrous sulfate 325 mg Oral Every Other Day Mirza Swengel, DO    fluticasone 1 spray Nasal Daily Renaee Deep, DO    fluticasone 2 puff Inhalation Q12H [address] Todd Brower DO    [START ON 8/22/2018] furosemide 40 mg Oral Daily Alvaro Gomez MD    gabapentin 100 mg Oral BID Mirza Swengel, DO    insulin glargine 18 Units Subcutaneous HS Mirza Swengel, DO    insulin glargine 22 Units Subcutaneous QAM [facility], DO    insulin lispro 2-12 Units Subcutaneous 4x Daily (AC & HS) [facility], DO    levETIRAcetam 750 mg Oral Q12H [address] Cayetano Snginger, DO    levothyroxine 100 mcg Oral Early Morning Cayetano Snginger, DO    loratadine 10 mg Oral Daily Cayetano Snginger, DO    magnesium oxide 400 mg Oral BID Cayetano Snginger, DO    metoclopramide 5 mg Oral Q6H PRN Cayetano Snginger, DO    metoprolol tartrate 12 5 mg Oral Q12H [address] Cayetano Snginger, DO    nitroglycerin 0 4 mg Sublingual Q3 min PRN Cayetano Kwan, DO    oxyCODONE 5 mg Oral Q4H PRN Onur Alejo, DO    pantoprazole 20 mg Oral Early Morning Cayetano Snginger, DO    polyethylene glycol 17 g Oral BID Onur Alejo, DO      Continuous Infusions:   PRN Meds: albuterol    metoclopramide    nitroglycerin    oxyCODONE    Surgical procedures (if appropriate):

## 2018-08-22 NOTE — PROGRESS NOTES
Cardiology Progress Note - Judie Pound [de-identified] y o  female MRN: [id number]    Unit/Bed#: -01 Encounter: [id number]    Assessment and plan  1  Coronary artery disease stable without angina  2  Atrial flutter currently in sinus rhythm  3  Chronic combined systolic and diastolic congestive heart failure  4  Hypertension  5  Hyperlipidemia  6  History of CVA  7  CKD  8  Peripheral Arterial disease   9  Cellulitis    Recommendations:  Coronary disease stable with no active angina  From a heart failure standpoint she appears euvolemic continue home dose of diuretic  Resume anticoagulation when stable from a surgical standpoint  Currently awaiting decision whether she needs amputation or not  Subjective:    No significant events overnight  Patient denies any chest pain or dyspnea  ROS    Objective:   Vitals: Blood pressure 150/68, pulse 58, temperature 98 °F (36 7 °C), temperature source Oral, resp  rate 18, height 5' 4" (1 626 m), weight 73 1 kg (161 lb 1 6 oz), SpO2 94 %, not currently breastfeeding , Body mass index is 27 65 kg/m² , Orthostatic Blood Pressures      Most Recent Value   Blood Pressure  150/68 filed at 08/22/2018 0710   Patient Position - Orthostatic VS  Lying filed at 08/22/2018 5803         Systolic (22GTT), IXN:411 , Min:0 , XHN:871     Diastolic (80HJU), GAL:13, Min:0, Max:75      Intake/Output Summary (Last 24 hours) at 08/22/18 0945  Last data filed at 08/21/18 1440   Gross per 24 hour   Intake              180 ml   Output                0 ml   Net              180 ml     Weight (last 2 days)     None            Telemetry Review: No significant arrhythmias seen on telemetry review  EKG personally reviewed by Mindy Del Real DO  Physical Exam   Constitutional: She is oriented to person, place, and time  She appears well-nourished  No distress  HENT:   Head: Atraumatic  Eyes: Conjunctivae are normal  Pupils are equal, round, and reactive to light     Neck: Neck supple  Cardiovascular: Normal rate, regular rhythm and normal heart sounds  Exam reveals no friction rub  No murmur heard  Pulmonary/Chest: Effort normal and breath sounds normal  No respiratory distress  She has no wheezes  She has no rales  Abdominal: Bowel sounds are normal  She exhibits no distension  There is no tenderness  There is no rebound  Musculoskeletal: She exhibits no edema  Neurological: She is alert and oriented to person, place, and time  No cranial nerve deficit  Skin: Skin is warm and dry  There is erythema  Nursing note and vitals reviewed  Laboratory Results:        CBC with diff:   Results from last 7 days  Lab Units 08/21/18  0500 08/18/18  1409 08/16/18  0607   WBC Thousand/uL 10 93* 10 53* 12 12*   HEMOGLOBIN g/dL 9 9* 9 1* 9 8*   HEMATOCRIT % 31 9* 29 2* 32 0*   MCV fL 76* 77* 78*   PLATELETS Thousands/uL 292 229 244   MCH pg 23 6* 24 1* 24 0*   MCHC g/dL 31 0* 31 2* 30 6*   RDW % 17 4* 17 5* 18 0*   MPV fL 10 3 10 1 10 1   NRBC AUTO /100 WBCs 0  --  0         CMP:  Results from last 7 days  Lab Units 08/22/18  0528 08/21/18  0500 08/18/18  0545 08/16/18  0607   SODIUM mmol/L 134* 132* 138 139   POTASSIUM mmol/L 3 7 4 3 4 3 4 2   CHLORIDE mmol/L 98* 99* 102 105   CO2 mmol/L 29 28 27 29   ANION GAP mmol/L 7 5 9 5   BUN mg/dL 27* 16 13 17   CREATININE mg/dL 0 99 0 97 0 78 0 76   GLUCOSE RANDOM mg/dL 182* 205* 84 66   CALCIUM mg/dL 8 5 8 6 8 9 8 5   EGFR ml/min/1 73sq m 54 55 72 74         BMP:  Results from last 7 days  Lab Units 08/22/18  0528 08/21/18  0500 08/18/18  0545 08/16/18  0607   SODIUM mmol/L 134* 132* 138 139   POTASSIUM mmol/L 3 7 4 3 4 3 4 2   CHLORIDE mmol/L 98* 99* 102 105   CO2 mmol/L 29 28 27 29   BUN mg/dL 27* 16 13 17   CREATININE mg/dL 0 99 0 97 0 78 0 76   GLUCOSE RANDOM mg/dL 182* 205* 84 66   CALCIUM mg/dL 8 5 8 6 8 9 8 5       BNP: No results for input(s): BNP in the last 72 hours      Magnesium:       Coags:   Results from last 7 days  Lab Units 18  0610 18  0545   PTT seconds  --  29   INR  1 18* 1 37*       TSH:        Hemoglobin A1C       Lipid Profile:       Cardiac testing:   Results for orders placed during the hospital encounter of 18   Echo complete with contrast if indicated    Narrative [address]  [address] [place] [address]  [phone]    Transthoracic Echocardiogram  2D, M-mode, Doppler, and Color Doppler    Study date:  01-May-2018    Patient: Candy Wetzel  MR number: [id number]  Account number: [de-identified]  : 1940  Age: [de-identified] years  Gender: Female  Status: Inpatient  Location: Bedside  Height: 64 in  Weight: 161 7 lb  BP: 171/ 83 mmHg    Indications: Aortic Valve Disorder Evaluate prosthetic aortic valve  Evaluate prosthetic mitral valve  Diagnoses: I35 9 - Nonrheumatic aortic valve disorder, unspecified    Sonographer:  Gail Boone RDCS  Primary Physician:  Lakshmi Awad MD  Group:  [facility]  Interpreting Physician:  Viviana Alcantara MD    SUMMARY    LEFT VENTRICLE:  The ventricle was mildly dilated  Systolic function was moderately to markedly reduced  Ejection fraction was estimated to be 35 %  There was moderate diffuse hypokinesis with distinct regional wall motion abnormalities  More severe hypokinesis to akinesis was seen in the inferoseptal and inferior walls  Wall thickness was mildly increased  VENTRICULAR SEPTUM:  There was moderate dyssynergic motion  These changes are consistent with LBBB  RIGHT VENTRICLE:  The ventricle was moderately dilated  Systolic function was mildly reduced  LEFT ATRIUM:  The atrium was moderately dilated  RIGHT ATRIUM:  The atrium was moderately dilated  MITRAL VALVE:  There was marked annular calcification  There was moderate diffuse thickening  There was moderately reduced leaflet separation  Transmitral velocity was increased due to valvular stenosis    There was moderate stenosis  There was mild regurgitation  AORTIC VALVE:  A bioprosthesis was present  It exhibited normal function  There was no significant perivalvular regurgitation  Valve mean gradient was 8 mmHg  TRICUSPID VALVE:  There was moderate regurgitation  Pulmonary artery systolic pressure was markedly increased  Estimated peak PA pressure was 70 mmHg  IVC, HEPATIC VEINS:  The inferior vena cava was dilated  HISTORY: PRIOR HISTORY: TAVR, Mitral Stenosis, CABG, MI, HTN, Asthma, CHF, DM, CVA, CAD, HLD    PROCEDURE: The procedure was performed at the bedside  This was a routine study  The transthoracic approach was used  The study included complete 2D imaging, M-mode, complete spectral Doppler, and color Doppler  The heart rate was 100 bpm,  at the start of the study  Images were obtained from the parasternal, apical, subcostal, and suprasternal notch acoustic windows  Echocardiographic views were limited due to restricted patient mobility, poor patient compliance, and lung  interference  This was a technically difficult study  LEFT VENTRICLE: The ventricle was mildly dilated  Systolic function was moderately to markedly reduced  Ejection fraction was estimated to be 35 %  There was moderate diffuse hypokinesis with distinct regional wall motion abnormalities  More severe hypokinesis to akinesis was seen in the inferoseptal and inferior walls  Wall thickness was mildly increased  DOPPLER: The study was not technically sufficient to allow evaluation of LV diastolic function  VENTRICULAR SEPTUM: Thickness was mildly increased  There was moderate dyssynergic motion  These changes are consistent with LBBB  RIGHT VENTRICLE: The ventricle was moderately dilated  Systolic function was mildly reduced  Wall thickness was normal     LEFT ATRIUM: The atrium was moderately dilated  RIGHT ATRIUM: The atrium was moderately dilated  MITRAL VALVE: There was marked annular calcification   There was moderate diffuse thickening  There was moderately reduced leaflet separation  DOPPLER: Transmitral velocity was increased due to valvular stenosis  There was moderate  stenosis  There was mild regurgitation  AORTIC VALVE: A bioprosthesis was present  It exhibited normal function  DOPPLER: Transaortic velocity was within the normal range  There was no evidence for stenosis  There was no significant regurgitation  There was no significant  perivalvular regurgitation  TRICUSPID VALVE: The valve structure was normal  DOPPLER: The transtricuspid velocity was within the normal range  There was moderate regurgitation  Pulmonary artery systolic pressure was markedly increased  Estimated peak PA pressure was  70 mmHg  PULMONIC VALVE: Not well visualized  DOPPLER: The transpulmonic velocity was within the normal range  There was no regurgitation  PERICARDIUM: There was no pericardial effusion  AORTA: The root exhibited normal size  The ascending aorta was normal in size  SYSTEMIC VEINS: IVC: The inferior vena cava was dilated  PULMONARY VEINS: DOPPLER: Doppler signals were not recordable in the pulmonary vein(s)  MEASUREMENT TABLES    DOPPLER MEASUREMENTS  Aortic valve   (Reference normals)  Mean gradient   8 mmHg   (--)    SYSTEM MEASUREMENT TABLES    2D  %FS: 15 07 %  Ao Diam: 2 75 cm  EDV(Teich): 139 25 ml  EF(Teich): 31 62 %  ESV(Teich): 95 22 ml  IVSd: 1 2 cm  LA Area: 23 57 cm2  LA Diam: 4 16 cm  LVEDV MOD A4C: 93 35 ml  LVEF MOD A4C: 41 28 %  LVESV MOD A4C: 54 81 ml  LVIDd: 5 37 cm  LVIDs: 4 56 cm  LVLd A4C: 7 64 cm  LVLs A4C: 6 64 cm  LVOT Diam: 1 9 cm  LVPWd: 1 21 cm  RA Area: 25 62 cm2  RVIDd: 5 25 cm  SV MOD A4C: 38 53 ml  SV(Teich): 44 03 ml    CW  AV Env  Ti: 318 34 ms  AV VTI: 47 91 cm  AV Vmax: 2 36 m/s  AV Vmean: 1 5 m/s  AV maxP 29 mmHg  AV meanPG: 10 48 mmHg  TR Vmax: 3 84 m/s  TR maxP 13 mmHg    MM  TAPSE: 1 4 cm    PW  CHER (VTI): 1 05 cm2  CHER Vmax: 1 03 cm2  E': 0 03 m/s  E/E': 81 41  HR: 173 4 BPM  LVOT Env  Ti: 332 18 ms  LVOT VTI: 17 78 cm  LVOT Vmax: 0 86 m/s  LVOT Vmean: 0 54 m/s  LVOT maxP 95 mmHg  LVOT meanP 37 mmHg  LVSI Dopp: 28 05 ml/m2  LVSV Dopp: 50 2 ml  MV A Jayant: 2 32 m/s  MV Dec Campbell: 11 07 m/s2  MV DecT: 201 33 ms  MV E Jayant: 2 23 m/s  MV E/A Ratio: 0 96  MV PHT: 58 39 ms  MV VTI: 50 85 cm  MV Vmax: 2 29 m/s  MV Vmean: 1 49 m/s  MV maxP 89 mmHg  MV meanPG: 10 01 mmHg  MVA (VTI): 0 99 cm2  MVA By PHT: 3 77 cm2    [facility]    Prepared and electronically signed by    Brittni Cleaning MD  Signed [id number] 15:08:38       Results for orders placed during the hospital encounter of 18   ROBB    Narrative [address]  [facility], [address] [facility]  [phone]    Transesophageal Echocardiogram  2D, Doppler, and Color Doppler    Study date:  2018    Patient: Jason Frank  MR number: [id number]  Account number: [de-identified]  : 1940  Age: [de-identified] years  Gender: Female  Status: Inpatient  Location: Echo lab  Height: 64 in  Weight: 167 lb  BP: 116/ 52 mmHg    Indications: CVA    Diagnoses: R55  - Syncope and collapse    Sonographer:  SIOBHAN Robledo  Primary Physician:  Kenny Park MD  Referring Physician:  Cornelius Solis MD  Group:  [facility]  Interpreting Physician:  Bishop Akins MD    SUMMARY    LEFT VENTRICLE:  Systolic function was normal  Ejection fraction was estimated to be 55 %  Although no diagnostic regional wall motion abnormality was identified, this possibility cannot be completely excluded on the basis of this study  Wall thickness was increased  Concentric hypertrophy was present  LEFT ATRIAL APPENDAGE:  No thrombus was identified  ATRIAL SEPTUM:  The septum bows from left to right, consistent with increased left atrial pressure    No defect or patent foramen ovale was identified by color Doppler or after injection of agitated saline  MITRAL VALVE:  There was moderate annular calcification  There was mild to moderate stenosis  Vmax 1 7 m/s, mean gradient 5 6 mm Hg, maximum gradient 11 mm Hg at a HR of 78 BPM   There was mild regurgitation  AORTIC VALVE:  A bioprosthesis was present  It exhibited normal function  TRICUSPID VALVE:  There was moderate to severe regurgitation  Estimated peak PA pressure was 55 mmHg  The findings suggest moderate pulmonary hypertension  HISTORY: PRIOR HISTORY: TAVR, Mitral Stenosis , CABG, MI, Hypertension, Asthma, CHF    PROCEDURE: The procedure was performed in the echo lab  This was a routine study  The risks and alternatives of the procedure were explained to the patient and informed consent was obtained  The transesophageal approach was used  The study  included complete 2D imaging, limited spectral Doppler, and color Doppler  The heart rate was 74 bpm, at the start of the study  An adult omniplane probe was inserted by the attending cardiologist  Intubated with ease  One intubation  attempt(s)  There was no blood detected on the probe  Image quality was adequate  There were no complications during the procedure  MEDICATIONS: Anesthesia administered by anesthesia team     LEFT VENTRICLE: Size was normal  Systolic function was normal  Ejection fraction was estimated to be 55 %  Although no diagnostic regional wall motion abnormality was identified, this possibility cannot be completely excluded on the basis  of this study  Wall thickness was increased  Concentric hypertrophy was present  RIGHT VENTRICLE: The size was normal  Systolic function was normal     LEFT ATRIUM: The atrium was dilated  No thrombus was identified  APPENDAGE: The appendage was dilated  No thrombus was identified  DOPPLER: The function was reduced (reduced emptying velocity)  ATRIAL SEPTUM: The septum bows from left to right, consistent with increased left atrial pressure   No defect or patent foramen ovale was identified by color Doppler or after injection of agitated saline  MITRAL VALVE: There was moderate annular calcification  There was mild-moderate calcification  There was mildly reduced leaflet separation  DOPPLER: There was mild to moderate stenosis  Vmax 1 7 m/s, mean gradient 5 6 mm Hg, maximum  gradient 11 mm Hg at a HR of 78 BPM  There was mild regurgitation  AORTIC VALVE: A bioprosthesis was present  It exhibited normal function  DOPPLER: There was no evidence for stenosis  There was no significant regurgitation  TRICUSPID VALVE: The valve structure was normal  There was normal leaflet separation  There was no echocardiographic evidence of vegetation  DOPPLER: There was moderate to severe regurgitation  Estimated peak PA pressure was 55 mmHg  The  findings suggest moderate pulmonary hypertension  PULMONIC VALVE: Leaflets exhibited normal thickness, no calcification, and normal cuspal separation  There was no echocardiographic evidence of vegetation  PERICARDIUM: There was no pericardial effusion  The pericardium was normal in appearance  AORTA: The root exhibited normal size  There was no atheroma  There was no evidence for dissection  There was no evidence for aneurysm  [address] Accredited Echocardiography Laboratory    Prepared and electronically signed by    Zamzam Jaramillo MD  Signed 18-Apr-2018 14:48:57       No results found for this or any previous visit  No results found for this or any previous visit      Meds/Allergies   all current active meds have been reviewed  Prescriptions Prior to Admission   Medication    amiodarone 200 mg tablet    amLODIPine (NORVASC) 5 mg tablet    aspirin 81 MG tablet    atorvastatin (LIPITOR) 80 mg tablet    Cholecalciferol (VITAMIN D3) 1000 units CHEW    esomeprazole (NEXIUM) 20 mg capsule    ferrous sulfate 325 (65 Fe) mg tablet    fluticasone (FLONASE) 50 mcg/act nasal spray    furosemide (LASIX) 40 mg tablet  glucose blood test strip    Insulin Syringe-Needle U-100 (BD INSULIN SYRINGE ULTRAFINE) 31G X 5/16" 1 ML MISC    LANTUS 100 UNIT/ML subcutaneous injection    levETIRAcetam (KEPPRA) 750 mg tablet    levothyroxine 100 mcg tablet    lisinopril (ZESTRIL) 5 mg tablet    loratadine (CLARITIN) 10 mg tablet    magnesium oxide (MAG-OX) 400 mg    metFORMIN (GLUCOPHAGE) 1000 MG tablet    metoclopramide (REGLAN) 10 mg tablet    Mometasone Furoate (ASMANEX HFA) 100 MCG/ACT AERO    pantoprazole (PROTONIX) 40 mg tablet    potassium chloride (K-DUR,KLOR-CON) 10 mEq tablet    rivaroxaban (XARELTO) 20 mg tablet    acetaminophen (TYLENOL) 650 mg CR tablet    albuterol (VENTOLIN HFA) 90 mcg/act inhaler    calcium carbonate (TUMS) 500 mg chewable tablet    cholecalciferol (VITAMIN D3) 1,000 units tablet    diphenhydrAMINE (BENADRYL) 50 mg capsule    methylPREDNISolone (MEDROL) 32 MG tablet    nitroglycerin (NITROSTAT) 0 4 mg SL tablet    phenazopyridine (PYRIDIUM) 200 mg tablet    polyethylene glycol (MIRALAX) 17 g packet          Assessment:  Principal Problem:    Cellulitis  Active Problems:    Essential hypertension    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 native heart without angina pectoris    Type 2 diabetes mellitus with complication, with long-term current use of insulin (HCC)    Atrial flutter (HCC)    Hyperlipidemia    Gastroesophageal reflux disease without esophagitis    Chronic combined systolic and diastolic congestive heart failure (HCC)    Paroxysmal atrial fibrillation (HCC)    Ulcer of toe of left foot (HCC)    Ulcer of toe of right foot (HCC)    History of CVA (cerebrovascular accident)    Seizure ([address] )    Anemia    PAD (peripheral artery disease) ([facility])    CKD (chronic kidney disease) stage 3, GFR 30-59 ml/min

## 2018-08-22 NOTE — PROGRESS NOTES
Progress Note - Inpatient Pain Management    Freya Nichols [de-identified] y o  female MRN: [id number]  Unit/Bed#: -01 Encounter: [id number]      Assessment:   Principal Problem:    Cellulitis  Active Problems:    Essential hypertension    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 native heart without angina pectoris    Type 2 diabetes mellitus with complication, with long-term current use of insulin (HCC)    Atrial flutter (HCC)    Hyperlipidemia    Gastroesophageal reflux disease without esophagitis    Chronic combined systolic and diastolic congestive heart failure (HCC)    Paroxysmal atrial fibrillation (HCC)    Ulcer of toe of left foot (HCC)    Ulcer of toe of right foot (HCC)    History of CVA (cerebrovascular accident)    Seizure ([address] )    Anemia    PAD (peripheral artery disease) ([facility])    CKD (chronic kidney disease) stage 3, GFR 30-59 ml/min      Plan/Recommendations:   Acute left foot pain/ischemic pain  · Acetaminophen 975mg Q8 hours   · Gabapentin 100mg BID (increased 8/20/2018)  · Reduce Oxycodone to 5mg TID PRN breakthrough pain  · Upon discharge, if pain remains controlled without the use of PRN medication, would not recommend Oxycodone at time of discharge    Pain is controlled on current regimen, minimal use PRN oxycodone  Patient has required one dose of Oxycodone in the last 24 hours  Will sign off  Please call with any questions  Re-consult as needed for increased pain or if surgical intervention is planned this admission  Reviewed with SOD-C    Pain History  Current pain location(s): left foot   Pain Scale:   0-10  Severity: severe at times  24 hour history: Patient resting in bed, appears comfortable   Pain in her b/l feet are present, overall she feels pain is tolerable and has improved since she came into the hospital      Current Analgesic regimen:  Tylenol 975mg Q8 hours, Gabapentin 100mg BID, Oxycodone 5mg Q4 hours PRN (1 dose)  Bowel Regimen: Colace BID, Miralax BID Meds/Allergies   all current active meds have been reviewed and current meds:   Current Facility-Administered Medications   Medication Dose Route Frequency    acetaminophen (TYLENOL) tablet 975 mg  975 mg Oral Q8H [address]    albuterol (PROVENTIL HFA,VENTOLIN HFA) inhaler 1 puff  1 puff Inhalation BID PRN    amiodarone tablet 200 mg  200 mg Oral Daily With Breakfast    amLODIPine (NORVASC) tablet 10 mg  10 mg Oral Daily    aspirin chewable tablet 81 mg  81 mg Oral Daily    atorvastatin (LIPITOR) tablet 80 mg  80 mg Oral Daily    calcium carbonate (TUMS) chewable tablet 1,000 mg  1,000 mg Oral TID    ceFAZolin (ANCEF) IVPB (premix) 1,000 mg  1,000 mg Intravenous Q8H    docusate sodium (COLACE) capsule 100 mg  100 mg Oral BID    ferrous sulfate tablet 325 mg  325 mg Oral Every Other Day    fluticasone (FLONASE) 50 mcg/act nasal spray 1 spray  1 spray Nasal Daily    fluticasone (FLOVENT HFA) 110 MCG/ACT inhaler 2 puff  2 puff Inhalation Q12H LANCE    furosemide (LASIX) tablet 40 mg  40 mg Oral Daily    gabapentin (NEURONTIN) capsule 100 mg  100 mg Oral BID    insulin glargine (LANTUS) subcutaneous injection 18 Units 0 18 mL  18 Units Subcutaneous HS    insulin glargine (LANTUS) subcutaneous injection 22 Units 0 22 mL  22 Units Subcutaneous QAM    insulin lispro (HumaLOG) 100 units/mL subcutaneous injection 2-12 Units  2-12 Units Subcutaneous 4x Daily (AC & HS)    levETIRAcetam (KEPPRA) tablet 750 mg  750 mg Oral Q12H [address]    levothyroxine tablet 100 mcg  100 mcg Oral Early Morning    loratadine (CLARITIN) tablet 10 mg  10 mg Oral Daily    magnesium oxide (MAG-OX) tablet 400 mg  400 mg Oral BID    metoclopramide (REGLAN) tablet 5 mg  5 mg Oral Q6H PRN    metoprolol tartrate (LOPRESSOR) partial tablet 12 5 mg  12 5 mg Oral Q12H [address]    nitroglycerin (NITROSTAT) SL tablet 0 4 mg  0 4 mg Sublingual Q3 min PRN    oxyCODONE (ROXICODONE) IR tablet 5 mg  5 mg Oral Q4H PRN    pantoprazole (PROTONIX) EC tablet 20 mg  20 mg Oral Early Morning    polyethylene glycol (MIRALAX) packet 17 g  17 g Oral BID       Allergies   Allergen Reactions    Other Chest Pain     IVP-listed as "chest pain" in previous chart, patient stated this occurred "a long time ago" but does not remember exactly occurred     Cephalosporins Chest Pain    Contrast [Iodinated Diagnostic Agents] Other (See Comments)     Flash pulm edema    Doxycycline Chest Pain    Ketorolac Other (See Comments)     Chest pain     Levaquin [Levofloxacin] Chest Pain    Ondansetron Chest Pain     Prolonged QT    Toradol [Ketorolac Tromethamine] Chest Pain       Objective     Temp:  [98 °F (36 7 °C)-98 5 °F (36 9 °C)] 98 °F (36 7 °C)  HR:  [58-63] 58  Resp:  [16-18] 18  BP: (128-150)/(63-68) 150/68      Intake/Output Summary (Last 24 hours) at 08/22/18 1323  Last data filed at 08/22/18 1030   Gross per 24 hour   Intake              300 ml   Output                0 ml   Net              300 ml                Physical Exam: /68 (BP Location: Left arm)   Pulse 58   Temp 98 °F (36 7 °C) (Oral)   Resp 18   Ht 5' 4" (1 626 m)   Wt 73 1 kg (161 lb 1 6 oz)   LMP  (LMP Unknown)   SpO2 94%   BMI 27 65 kg/m²     General Appearance:    Alert, cooperative, no distress   Neurological:   Oriented to person, place, and time    Head:    Normocephalic, without obvious abnormality, atraumatic   Eyes:    EOM's intact   Lungs:     Respirations unlabored   Chest Wall:    No tenderness or deformity   Skin:   Skin color pale, b/l foot dressing intact, left foot with mild edema     Lab Results:     Results from last 7 days  Lab Units 08/21/18  0500   WBC Thousand/uL 10 93*   HEMOGLOBIN g/dL 9 9*   HEMATOCRIT % 31 9*   PLATELETS Thousands/uL 292        Results from last 7 days  Lab Units 08/22/18  0528   SODIUM mmol/L 134*   POTASSIUM mmol/L 3 7   CHLORIDE mmol/L 98*   CO2 mmol/L 29   BUN mg/dL 27*   CREATININE mg/dL 0 99   CALCIUM mg/dL 8 5   GLUCOSE RANDOM mg/dL 182* Counseling / Coordination of Care  Total floor / unit time spent today 15 minutes  Greater than 50% of total time was spent with the patient and / or family counseling and / or coordination of care   A description of the counseling / coordination of care: Reviewed plan of care and medications with patient, RN staff and primary care team     Dana Sharp MS, RN-BC  Acute Pain

## 2018-08-22 NOTE — OCCUPATIONAL THERAPY NOTE
[address] Progress Note     Patient Name: Vilma Kearney  ZMEEKX'B Date: 8/22/2018  Problem List  Patient Active Problem List   Diagnosis    Essential hypertension    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 native heart without angina pectoris    Type 2 diabetes mellitus with complication, with long-term current use of insulin (HCC)    Atrial flutter (HCC)    Hyperlipidemia    Gastroesophageal reflux disease without esophagitis    Moderate mitral stenosis    Arthritis of hand    Acute respiratory failure with hypoxia ([facility] [address] )    Asthma    Asymptomatic carotid artery stenosis, bilateral    Atherosclerosis of native artery of both lower extremities with bilateral ulceration (HCC)    Hx of CABG    Chronic anticoagulation    Chronic combined systolic and diastolic congestive heart failure (HCC)    Degenerative joint disease involving multiple joints    Diabetic retinopathy (HCC)    Postoperative hypothyroidism    Migraine headache    Nephrolithiasis    Osteoarthritis of both knees    Paroxysmal atrial fibrillation (HCC)    Ulcer of toe of left foot (HCC)    Ulcer of toe of right foot (HCC)    Prolonged Q-T interval on ECG    Hypokalemia    Hypocalcemia    Left bundle branch block (LBBB)    1st degree AV block    S/P TAVR (transcatheter aortic valve replacement)    Expressive aphasia    Multiple lacunar infarcts (HCC)    Vomiting    History of CVA (cerebrovascular accident)    Seizure ([facility] [address] )    Supratherapeutic INR    NSVT (nonsustained ventricular tachycardia) (HCC)    Pulmonary hypertension (HCC)    Anemia    Severe sepsis (HCC)    Lactic acidosis    Hypomagnesemia    Bradycardia    Chest pain    Foot pain    PAD (peripheral artery disease) (HCC)    Cellulitis    CKD (chronic kidney disease) stage 3, GFR 30-59 ml/min           08/22/18 1148   Restrictions/Precautions   RLE Weight Bearing Per Order WBAT   LLE Weight Bearing Per Order WBAT Braces or Orthoses Other (Comment)  (B/L sx shoes )   Other Precautions Pain; Fall Risk   Lifestyle   Autonomy "I'm not sure if it's worth it to get the surgery"    Pain Assessment   Pain Assessment 0-10   Pain Score Worst Possible Pain   Pain Type Chronic pain   Pain Location Foot   Pain Orientation Bilateral   Hospital Pain Intervention(s) Repositioned   Response to Interventions pain limits weight bearing during functional mobility    ADL   Grooming Assistance 6  Modified Independent   Grooming Deficit Wash/dry hands   Grooming Comments pt completes grooming while in stance at sink with increased time    Toileting Assistance  6  Modified independent   Toileting Deficit Perineal hygiene   Toileting Comments pt completes toileting with increased time and use of adaptive equipment    Functional Standing Tolerance   Activity Pt engaged in dynamic standing balance activity at [facility] without use of RW x2 minutes  Pt requires contact guard assist with B/L UE release to engage in item retrieval  Pt reports using RW at home at baseline 2* impairments in balance  OTR/L recommending continued use of RW for functional tasks/mobility, pt in agreement  Transfers   Sit to Stand 6  Modified independent   Additional items Increased time required   Stand to Sit 6  Modified independent   Additional items Increased time required   Functional Mobility   Functional Mobility 5  Supervision   Additional Comments pt completes functional mobility within room/bathroom at supervision level using RW  Pt provided with supervision 2* fall risk 2* impaired dynamic standing balance and LE pain    Additional items Rolling walker   Cognition   Overall Cognitive Status WFL   Following Commands Follows multistep commands without difficulty   Comments pt engages in appropriate complex conversation, becomes tearful when discussing option for LE amputation or not   Pt brought up questions to OTR/L, OTR/L provided pt with paper/pencil and began to write down questions pt will ask her son/vascular sx  Pt reports today she was not "up to" asking vascular sx questions regarding what type of amputation she would receive  OTR/L provided pt with emotional support  Additional Activities   Additional Activities Comments Pt engaged in item retrieval from floor using reacher 2* impaired dynamic standing balance and pt living at home alone  OTR/L educated pt on standing close to item to retrieve from floor with reacher to decrease risk of LOB/falls, pt requires verbal cues to carryover education  OTR/L recommending pt purchase reacher for home to increase independence in item retrieval     Activity Tolerance   Activity Tolerance Patient limited by pain   Assessment   Assessment Pt participated in skilled OT tx session focused on item retrieval from floor, functional mobility/transfers, standing balance, toilet transfers, grooming  See above for further details on functional performance  Pt's biggest barrier to return to home is B/L foot pain  Pt able to complete toileting at mod I level  Pt requires supervision for functional mobility to decrease fall risk 2* c/o severe foot pain during functional mobility, which also limits pt's ability to engage in mobility of household distances  Pt reports she has not made a decision regarding plan for LE amputation vs pain control, OTR/L assisted pt in writing down questions pt verbalized to ask vascular sx  From OT standpoint, pt remains limited in ADL/IADL function 2* impaired standing tolerance 2* poor LE pain control when weight bearing  OT will continue to follow and will updated goals for another 10 days, see below for updated goals  From OT standpoint, D/C recommendations pending medical plan  Based on today's performance, if B/L foot pain better controlled anticipate pt could D/C home with family support for IADLS  However, medical plan pending pt/son discussion on possible amputation vs further pain control   OT will continue to follow  Plan   Treatment Interventions ADL retraining;Functional transfer training; Endurance training;Equipment evaluation/education; Compensatory technique education;UE strengthening/ROM   Goal Expiration Date 09/01/18   Treatment Day 5   OT Frequency 3-5x/wk   Recommendation   OT Discharge Recommendation (to be determined pending medical plan and pt progress )   Barthel Index   Feeding 10   Bathing 0   Grooming Score 5   Dressing Score 5   Bladder Score 10   Bowels Score 10   Toilet Use Score 10   Transfers (Bed/Chair) Score 10   Mobility (Level Surface) Score 0   Stairs Score 0   Barthel Index Score 60   Modified [place] Scale   Modified [place] Scale 3       1  Pt will complete UB/LB ADLS with mod independence and use of adaptive equipment as needed     2  Pt will complete toileting, including thorough hygiene, with mod independence and use of DME PRN     3  Pt will complete bed mobility and transfer to EOB with mod independence to increase bed mobility to increase participation in ADLS/IADLS    4  Pt will complete functional transfers on/off all surfaces, including toilet, with mod independence to increase participation in ADLS/IADLS    5  Pt will complete simulated household IADLS at mod I level to with use of RW     6   Pt will complete UB HEP independently s/p review to increase UB strength during functional tasks       Tuyet Gonzalez, MS, OTR/L , CBIS

## 2018-08-22 NOTE — PLAN OF CARE
Problem: PHYSICAL THERAPY ADULT  Goal: Performs mobility at highest level of function for planned discharge setting  See evaluation for individualized goals  Treatment/Interventions: Functional transfer training, LE strengthening/ROM, Therapeutic exercise, Endurance training, Patient/family training, Equipment eval/education, Bed mobility, Gait training, Spoke to nursing, Spoke to case management  Equipment Recommended: [place]       See flowsheet documentation for full assessment, interventions and recommendations  Prognosis: Good  Problem List: Decreased strength, Decreased endurance, Impaired balance, Decreased mobility, Pain, Orthopedic restrictions  Assessment: Pt continues with pain of B feet, limiting activity and standing tolerance  Decision for possible amputation has not been decided at this time  Pt reporting she would return home to her home with 1st floor set up or possibly go to her daughters  Pt continues to function with RW at close supervision level within confines of room  Barriers to Discharge: Inaccessible home environment, Decreased caregiver support  Barriers to Discharge Comments: Lives alone   Recommendation: Home with family support, Home PT          See flowsheet documentation for full assessment

## 2018-08-22 NOTE — PROGRESS NOTES
Progress Note - Vascular Surgery   Jewel Marmolejo [de-identified] y o  female MRN: [id number]  Unit/Bed#: -01 Encounter: [id number]    Assessment:  [de-identified] F with PAD, dry gangrene on b/l 1st toes, L 2nd toe, R 3rd toe with L foot edema and erythema    - Angiogram 8/21 - tibial runoff; DP size decreased from 9/17, luminal defect seen in L CFA  - Wound care vs amputation option to be discussed by patient with son  - Patient with contrast allergy    Plan:  - Continue Diet as tolerated  - Pain control  - Appreciate wound care/Abx per ID  - Discussion of how to proceed pending Patient and Family conversation   - Amputation vs Local Wound Care/Pain care      Subjective/Objective   Chief Complaint:     Subjective: No acute events overnight  Patient states her pain is better and more in control  She does still have some neuropathy on medial plantar side of left foot  Objective:     Blood pressure 143/63, pulse 58, temperature 98 3 °F (36 8 °C), temperature source Oral, resp  rate 16, height 5' 4" (1 626 m), weight 73 1 kg (161 lb 1 6 oz), SpO2 93 %, not currently breastfeeding  ,Body mass index is 27 65 kg/m²  Intake/Output Summary (Last 24 hours) at 08/22/18 0158  Last data filed at 08/21/18 1440   Gross per 24 hour   Intake              180 ml   Output              200 ml   Net              -20 ml       Invasive Devices     Peripheral Intravenous Line            Peripheral IV 08/18/18 Right Forearm 3 days                Physical Exam: General: AAOx3  Respiratory: BS b/l  Abdomen: Soft, NT, no rebound/guarding  Heart: RRR, S1s2  Ext: LLE with bandage over foot and toes  Left great toe with dry gangrene ulcer on medial plantar surface  Cellulitis going down  Edema no longer present  Lab, Imaging and other studies:    8/21 IR Angiogram LLE - Impression:      1  Severe multivessel disease in the left calf, not amenable to endovascular intervention    Patent left superficial femoral and popliteal arteries with diffuse nonflow limiting disease        2  Focal filling defect in the left common femoral artery possibly related to prior access and closure      Plan: No intervention at this time, return to wound care  Follow-up imaging of the left common femoral artery  I have personally reviewed pertinent lab results    , CBC:   Lab Results   Component Value Date    WBC 10 93 (H) 08/21/2018    HGB 9 9 (L) 08/21/2018    HCT 31 9 (L) 08/21/2018    MCV 76 (L) 08/21/2018     08/21/2018    MCH 23 6 (L) 08/21/2018    MCHC 31 0 (L) 08/21/2018    RDW 17 4 (H) 08/21/2018    MPV 10 3 08/21/2018    NRBC 0 08/21/2018   , CMP:   Lab Results   Component Value Date     (L) 08/21/2018    K 4 3 08/21/2018    CL 99 (L) 08/21/2018    CO2 28 08/21/2018    ANIONGAP 5 08/21/2018    BUN 16 08/21/2018    CREATININE 0 97 08/21/2018    GLUCOSE 205 (H) 08/21/2018    CALCIUM 8 6 08/21/2018    EGFR 55 08/21/2018     VTE Pharmacologic Prophylaxis: Reason for no pharmacologic prophylaxis Xarelto held  VTE Mechanical Prophylaxis: sequential compression device

## 2018-08-22 NOTE — PLAN OF CARE
Problem: OCCUPATIONAL THERAPY ADULT  Goal: Performs self-care activities at highest level of function for planned discharge setting  See evaluation for individualized goals  Treatment Interventions: ADL retraining, Functional transfer training, Endurance training, UE strengthening/ROM, Patient/family training, Equipment evaluation/education, Compensatory technique education, Energy conservation, Activityengagement          See flowsheet documentation for full assessment, interventions and recommendations  Limitation: Decreased ADL status, Decreased endurance, Decreased high-level ADLs, Decreased self-care trans  Prognosis: Good  Assessment: (S) Pt participated in skilled OT tx session focused on item retrieval from floor, functional mobility/transfers, standing balance, toilet transfers, grooming  See above for further details on functional performance  Pt's biggest barrier to return to home is B/L foot pain  Pt able to complete toileting at mod I level  Pt requires supervision for functional mobility to decrease fall risk 2* c/o severe foot pain during functional mobility, which also limits pt's ability to engage in mobility of household distances  Pt reports she has not made a decision regarding plan for LE amputation vs pain control, OTR/L assisted pt in writing down questions pt verbalized to ask vascular sx  From OT standpoint, pt remains limited in ADL/IADL function 2* impaired standing tolerance 2* poor LE pain control when weight bearing  OT will continue to follow and will updated goals for another 10 days, see below for updated goals  From OT standpoint, D/C recommendations pending medical plan  Based on today's performance, if B/L foot pain better controlled anticipate pt could D/C home with family support for IADLS  However, medical plan pending pt/son discussion on possible amputation vs further pain control  OT will continue to follow        OT Discharge Recommendation:  (to be determined pending medical plan and pt progress )  OT - OK to Discharge:  (when medically stable)

## 2018-08-22 NOTE — PROGRESS NOTES
IM Residency Progress Note   Unit/Bed#: -01 Encounter: [id number]  SOD Team C       Nash Guzman [de-identified] y o  female [id number]    Hospital Stay Days: 13      Assessment/Plan:    Principal Problem:    Cellulitis  Active Problems:    Essential hypertension    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 native heart without angina pectoris    Type 2 diabetes mellitus with complication, with long-term current use of insulin ([facility])    Atrial flutter (HCC)    Hyperlipidemia    Gastroesophageal reflux disease without esophagitis    Chronic combined systolic and diastolic congestive heart failure ([facility])    Paroxysmal atrial fibrillation ([facility])    Ulcer of toe of left foot (HCC)    Ulcer of toe of right foot ([facility])    History of CVA (cerebrovascular accident)    Seizure ([facility] [address] )    Anemia    PAD (peripheral artery disease) ([facility])    CKD (chronic kidney disease) stage 3, GFR 30-59 ml/min    1  B/L Toe Wounds with 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 2/2 to severe PAD s/p IR RLE angioplasties of R distal SFA, mid R peroneal artery  - podiatry-local wound care to bilateral feet, no surgical plan for right foot at present     - vascular-LLE angiogram today with poor reconstructive options  Plan for local wound care with pain control vs amputation  Son would like second opinion  Continue symptomatic care and close follow up as outpatient  - continue cefazolin per ID  - continue pain control - oxycodone, tylenol, gapabentin per acute Pain service     2  Acute on chronic Combined Systolic and Diastolic Heart Failure   Resolved, required IV diuresis   - TAVR placed 4/18  -ROBB on 5/8/9263 showed systolic function was moderately to markedly reduced  Ejection fraction was estimated to be 35 % with severe pulmonary hypertension  - appears euvolemic on exam today  - continue  home oral diuretic regimen   - ACE-inhibitor on hold with possible procedure  May resume tomorrow or on discharge     - Appreciate cardiology recommendations      3  CAD status post CABG   - continue ASA 81 mg, Atorvastatin 80 mg, Metoprolol 12 5 mg     4  Paroxysmal Atrial Fibrillation - NSR  - rates well controlled  - Amiodarone 200 mg, lopressor  - Xarelto 20 mg daily on hold for possible procedure  Resume on discharge       5  Type 2 DM  - Metformin and Lantus at home  - Hold Metformin  - Lantus 22 units q a m  and 18 units qhs with sliding scale      6  GERD   - continue pantoprazole 20 mg      7  Microcytic Anemia   - Hgb stable   -Ferrous sulfate every other day      8  Asthma   - Albuterol prn  - Fluticasone 2 puffs q12 hours      9  Seizure Disorder   - continue keppra 750 mg q12 hours      10  Hypothyroidism   - levothyroxine 100 mcg daily     11  HTN   - cont  amlodipine to 10mg daily, lopressor   - Ace inhibitor on hold prior to possible procedure     Disposition:  Per SOD, awaiting management plan per vascular  Subjective:   Patient seen examined at bedside  Patient is distressed as overnight she her demand walking down the hallway stand the doorway than walk away  She also heard a woman crying that annoyed her  She is concerned about her son understanding her plan of care  Vitals: Temp (24hrs), Av 3 °F (36 8 °C), Min:98 °F (36 7 °C), Max:98 5 °F (36 9 °C)  Current: Temperature: 98 °F (36 7 °C)  Vitals:    18 1555 18 2211 18 0036 18 0710   BP: 128/68 142/63 143/63 150/68   BP Location: Left arm  Left arm Left arm   Pulse: 58 63 58 58   Resp: 16  16 18   Temp: 98 5 °F (36 9 °C)  98 3 °F (36 8 °C) 98 °F (36 7 °C)   TempSrc: Oral  Oral Oral   SpO2: 92%  93% 94%   Weight:       Height:        Body mass index is 27 65 kg/m²  I/O last 24 hours: In: 180 [P O :180]  Out: 200 [Urine:200]      Physical Exam:   Physical Exam   Constitutional: She is oriented to person, place, and time  She appears well-developed and well-nourished  No distress  HENT:   Head: Normocephalic and atraumatic     Mouth/Throat: Oropharynx is clear and moist  No oropharyngeal exudate  Eyes: Conjunctivae and EOM are normal  Pupils are equal, round, and reactive to light  No scleral icterus  Cardiovascular: Normal rate, regular rhythm and normal heart sounds  No murmur heard  Pulmonary/Chest: Effort normal and breath sounds normal  No respiratory distress  She has no wheezes  She has no rales  Abdominal: Soft  Bowel sounds are normal  She exhibits no distension  There is no tenderness  There is no guarding  Musculoskeletal: She exhibits deformity (ulceration on b/l toes )  She exhibits no edema  Neurological: She is alert and oriented to person, place, and time  Skin: Skin is warm and dry  She is not diaphoretic  There is erythema (b/l feet )  Psychiatric: She has a normal mood and affect   Her behavior is normal  Judgment and thought content normal        Invasive Devices     Peripheral Intravenous Line            Peripheral IV 08/18/18 Right Forearm 3 days                          Labs: Labs personally reviewed    Radiology Results: Imaging personally reviewed    Active Meds:   Current Facility-Administered Medications   Medication Dose Route Frequency    acetaminophen (TYLENOL) tablet 975 mg  975 mg Oral Q8H [facility]    albuterol (PROVENTIL HFA,VENTOLIN HFA) inhaler 1 puff  1 puff Inhalation BID PRN    amiodarone tablet 200 mg  200 mg Oral Daily With Breakfast    amLODIPine (NORVASC) tablet 10 mg  10 mg Oral Daily    aspirin chewable tablet 81 mg  81 mg Oral Daily    atorvastatin (LIPITOR) tablet 80 mg  80 mg Oral Daily    calcium carbonate (TUMS) chewable tablet 1,000 mg  1,000 mg Oral TID    ceFAZolin (ANCEF) IVPB (premix) 1,000 mg  1,000 mg Intravenous Q8H    docusate sodium (COLACE) capsule 100 mg  100 mg Oral BID    ferrous sulfate tablet 325 mg  325 mg Oral Every Other Day    fluticasone (FLONASE) 50 mcg/act nasal spray 1 spray  1 spray Nasal Daily    fluticasone (FLOVENT HFA) 110 MCG/ACT inhaler 2 puff  2 puff Inhalation Q12H [facility]    furosemide (LASIX) tablet 40 mg  40 mg Oral Daily    gabapentin (NEURONTIN) capsule 100 mg  100 mg Oral BID    insulin glargine (LANTUS) subcutaneous injection 18 Units 0 18 mL  18 Units Subcutaneous HS    insulin glargine (LANTUS) subcutaneous injection 22 Units 0 22 mL  22 Units Subcutaneous QAM    insulin lispro (HumaLOG) 100 units/mL subcutaneous injection 2-12 Units  2-12 Units Subcutaneous 4x Daily (AC & HS)    levETIRAcetam (KEPPRA) tablet 750 mg  750 mg Oral Q12H [facility]    levothyroxine tablet 100 mcg  100 mcg Oral Early Morning    loratadine (CLARITIN) tablet 10 mg  10 mg Oral Daily    magnesium oxide (MAG-OX) tablet 400 mg  400 mg Oral BID    metoclopramide (REGLAN) tablet 5 mg  5 mg Oral Q6H PRN    metoprolol tartrate (LOPRESSOR) partial tablet 12 5 mg  12 5 mg Oral Q12H [facility]    nitroglycerin (NITROSTAT) SL tablet 0 4 mg  0 4 mg Sublingual Q3 min PRN    oxyCODONE (ROXICODONE) IR tablet 5 mg  5 mg Oral Q4H PRN    pantoprazole (PROTONIX) EC tablet 20 mg  20 mg Oral Early Morning    polyethylene glycol (MIRALAX) packet 17 g  17 g Oral BID         VTE Pharmacologic Prophylaxis: Reason for no pharmacologic prophylaxis xarelto on hold for procedure   VTE Mechanical Prophylaxis: Bilateral SCDs    Cj Burns DO

## 2018-08-23 LAB
GLUCOSE SERPL-MCNC: 138 MG/DL (ref 65–140)
GLUCOSE SERPL-MCNC: 181 MG/DL (ref 65–140)
GLUCOSE SERPL-MCNC: 82 MG/DL (ref 65–140)
GLUCOSE SERPL-MCNC: 99 MG/DL (ref 65–140)

## 2018-08-23 PROCEDURE — 99232 SBSQ HOSP IP/OBS MODERATE 35: CPT | Performed by: INTERNAL MEDICINE

## 2018-08-23 PROCEDURE — 99233 SBSQ HOSP IP/OBS HIGH 50: CPT | Performed by: INTERNAL MEDICINE

## 2018-08-23 PROCEDURE — 82948 REAGENT STRIP/BLOOD GLUCOSE: CPT

## 2018-08-23 RX ORDER — GABAPENTIN 100 MG/1
100 CAPSULE ORAL 2 TIMES DAILY
Qty: 60 CAPSULE | Refills: 0 | Status: CANCELLED | OUTPATIENT
Start: 2018-08-23

## 2018-08-23 RX ORDER — OXYCODONE HYDROCHLORIDE 5 MG/1
5 TABLET ORAL EVERY 4 HOURS PRN
Qty: 30 TABLET | Refills: 0 | Status: CANCELLED
Start: 2018-08-23 | End: 2018-09-02

## 2018-08-23 RX ORDER — FERROUS SULFATE 325(65) MG
325 TABLET ORAL EVERY OTHER DAY
Qty: 30 TABLET | Refills: 0 | Status: CANCELLED | OUTPATIENT
Start: 2018-08-23

## 2018-08-23 RX ORDER — FENTANYL 25 UG/H
25 PATCH TRANSDERMAL
Status: DISCONTINUED | OUTPATIENT
Start: 2018-08-23 | End: 2018-08-24 | Stop reason: HOSPADM

## 2018-08-23 RX ORDER — OXYCODONE HYDROCHLORIDE 5 MG/1
2.5 TABLET ORAL ONCE
Status: COMPLETED | OUTPATIENT
Start: 2018-08-23 | End: 2018-08-23

## 2018-08-23 RX ORDER — ACETAMINOPHEN 325 MG/1
975 TABLET ORAL EVERY 8 HOURS
Qty: 180 TABLET | Refills: 0 | Status: CANCELLED | OUTPATIENT
Start: 2018-08-23

## 2018-08-23 RX ADMIN — POLYETHYLENE GLYCOL 3350 17 G: 17 POWDER, FOR SOLUTION ORAL at 08:52

## 2018-08-23 RX ADMIN — AMLODIPINE BESYLATE 10 MG: 10 TABLET ORAL at 09:12

## 2018-08-23 RX ADMIN — GABAPENTIN 100 MG: 100 CAPSULE ORAL at 09:13

## 2018-08-23 RX ADMIN — PANTOPRAZOLE SODIUM 20 MG: 20 TABLET, DELAYED RELEASE ORAL at 05:26

## 2018-08-23 RX ADMIN — ASPIRIN 81 MG 81 MG: 81 TABLET ORAL at 09:12

## 2018-08-23 RX ADMIN — ATORVASTATIN CALCIUM 80 MG: 80 TABLET, FILM COATED ORAL at 08:54

## 2018-08-23 RX ADMIN — FENTANYL 25 MCG: 25 PATCH TRANSDERMAL at 12:21

## 2018-08-23 RX ADMIN — ACETAMINOPHEN 975 MG: 325 TABLET, FILM COATED ORAL at 21:25

## 2018-08-23 RX ADMIN — INSULIN GLARGINE 18 UNITS: 100 INJECTION, SOLUTION SUBCUTANEOUS at 21:27

## 2018-08-23 RX ADMIN — INSULIN GLARGINE 22 UNITS: 100 INJECTION, SOLUTION SUBCUTANEOUS at 08:53

## 2018-08-23 RX ADMIN — OXYCODONE HYDROCHLORIDE 5 MG: 5 TABLET ORAL at 14:17

## 2018-08-23 RX ADMIN — FLUTICASONE PROPIONATE 1 SPRAY: 50 SPRAY, METERED NASAL at 09:14

## 2018-08-23 RX ADMIN — METOPROLOL TARTRATE 12.5 MG: 25 TABLET ORAL at 21:25

## 2018-08-23 RX ADMIN — Medication 400 MG: at 17:09

## 2018-08-23 RX ADMIN — OXYCODONE HYDROCHLORIDE 2.5 MG: 5 TABLET ORAL at 07:53

## 2018-08-23 RX ADMIN — FUROSEMIDE 40 MG: 40 TABLET ORAL at 09:13

## 2018-08-23 RX ADMIN — OXYCODONE HYDROCHLORIDE 5 MG: 5 TABLET ORAL at 01:38

## 2018-08-23 RX ADMIN — INSULIN LISPRO 2 UNITS: 100 INJECTION, SOLUTION INTRAVENOUS; SUBCUTANEOUS at 11:35

## 2018-08-23 RX ADMIN — OXYCODONE HYDROCHLORIDE 5 MG: 5 TABLET ORAL at 21:26

## 2018-08-23 RX ADMIN — FLUTICASONE PROPIONATE 2 PUFF: 110 AEROSOL, METERED RESPIRATORY (INHALATION) at 21:28

## 2018-08-23 RX ADMIN — CEFAZOLIN SODIUM 1000 MG: 1 SOLUTION INTRAVENOUS at 00:37

## 2018-08-23 RX ADMIN — OXYCODONE HYDROCHLORIDE 5 MG: 5 TABLET ORAL at 09:55

## 2018-08-23 RX ADMIN — LEVETIRACETAM 750 MG: 750 TABLET ORAL at 08:55

## 2018-08-23 RX ADMIN — ACETAMINOPHEN 975 MG: 325 TABLET, FILM COATED ORAL at 13:44

## 2018-08-23 RX ADMIN — AMIODARONE HYDROCHLORIDE 200 MG: 200 TABLET ORAL at 07:45

## 2018-08-23 RX ADMIN — LEVETIRACETAM 750 MG: 750 TABLET ORAL at 21:25

## 2018-08-23 RX ADMIN — LORATADINE 10 MG: 10 TABLET ORAL at 09:12

## 2018-08-23 RX ADMIN — DOCUSATE SODIUM 100 MG: 100 CAPSULE, LIQUID FILLED ORAL at 09:12

## 2018-08-23 RX ADMIN — LEVOTHYROXINE SODIUM 100 MCG: 100 TABLET ORAL at 05:26

## 2018-08-23 RX ADMIN — GABAPENTIN 100 MG: 100 CAPSULE ORAL at 17:09

## 2018-08-23 RX ADMIN — ACETAMINOPHEN 975 MG: 325 TABLET, FILM COATED ORAL at 05:26

## 2018-08-23 RX ADMIN — CEFAZOLIN SODIUM 1000 MG: 1 SOLUTION INTRAVENOUS at 07:46

## 2018-08-23 RX ADMIN — DOCUSATE SODIUM 100 MG: 100 CAPSULE, LIQUID FILLED ORAL at 17:09

## 2018-08-23 RX ADMIN — Medication 400 MG: at 08:54

## 2018-08-23 RX ADMIN — OXYCODONE HYDROCHLORIDE 5 MG: 5 TABLET ORAL at 05:27

## 2018-08-23 RX ADMIN — FLUTICASONE PROPIONATE 2 PUFF: 110 AEROSOL, METERED RESPIRATORY (INHALATION) at 09:14

## 2018-08-23 NOTE — DISCHARGE INSTRUCTIONS
ARTERIOGRAM    WHAT YOU SHOULD KNOW:   An angiogram is a procedure to look at arteries in your body  Arteries are the blood vessels that carry blood from your heart to your body  AFTER YOU LEAVE:     Self-care:   · Limit activity: Rest for the remainder of the day of your procedure  Have some one with you until the next morning  Keep your arm or leg straight as much as possible  Rest as much as possible, sitting lying or reclining  Walk only to go to the bathroom, to bed or to eat  If the angiogram catheter was put in your leg, use the stairs as little as possible  No driving  · Keep your wound clean and dry  You may shower 24 hours after your procedure  The bandage you have on should fall off in 2-3 days  If there is any drainage from the puncture site, you should put on a clean bandage  · Watch for bleeding and bruising: It is normal to have a bruise and soreness where the angiogram catheter went in  · Diet:   · You may resume your regular diet, Sips of flat soda will help with mild nausea  · Drink more liquids than usual for the next 24 hours      · IMMEDIATELY Contact Interventional Radiology at [phone] [place] PATIENTS: Contact Interventional Radiology at [phone]) Bienvenido Guy PATIENTS: Contact Interventional Radiology at [phone]) if any of the following occur:  · If your bruise gets larger or if you notice any active bleeding  APPLY DIRECT PRESSURE TO THE BLEEDING SITE  · If you notice increased swelling or have increased pain at the puncture site   · If you have any numbness or pain in the extremity of the puncture site   · If that extremity seems cold or pale  · You have fever greater than 101  · Persistent nausea or vomitting    Follow up with your primary healthcare provider  as directed: Write down your questions so you remember to ask them during your visits          Wound Care instructions:  Please apply betadine to left and right foot wounds   Apply dermagran-gauze to right inner-foot wound  Keep area clean and dry  Do not soak  Please report to [facility] [place] [id number]  for vascular testing on Monday 8/20/18 at 7:15pm   Please schedule podiatry appointment with Dr Johanna Tobar

## 2018-08-23 NOTE — PHYSICAL THERAPY NOTE
PT Cancellation Note    Pt cont to refuse therapy and to practice stairs due to foot pain  Talked with pt about what her plan would be to enter her house if she d/c to home from here; pt said she wasn't sure  To cont to follow as appropriate

## 2018-08-23 NOTE — PROGRESS NOTES
IM Residency Progress Note   Unit/Bed#: -01 Encounter: [id number]  SOD Team C       Will Quispe [de-identified] y o  female [id number]    Hospital Stay Days: 14      Assessment/Plan:    Principal Problem:    Cellulitis  Active Problems:    Essential hypertension    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 native heart without angina pectoris    Type 2 diabetes mellitus with complication, with long-term current use of insulin (HCC)    Atrial flutter (HCC)    Hyperlipidemia    Gastroesophageal reflux disease without esophagitis    Chronic combined systolic and diastolic congestive heart failure (HCC)    Paroxysmal atrial fibrillation (HCC)    Ulcer of toe of left foot (HCC)    Ulcer of toe of right foot (HCC)    History of CVA (cerebrovascular accident)    Seizure ([facility] [address] )    Anemia    PAD (peripheral artery disease) ([facility])    CKD (chronic kidney disease) stage 3, GFR 30-59 ml/min    1  B/L Toe Wounds with 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 2/2 to severe PAD s/p IR RLE angioplasties of R distal SFA, mid R peroneal artery  - podiatry-local wound care to bilateral feet, no surgical plan for right foot at present     - vascular-LLE angiogram today with poor reconstructive options  Plan for local wound care with pain control vs amputation    - Son would like second opinion  Plan to discharge home with outpatient VNA, follow-up with PCP, 2nd opinion   - continue cefazolin per ID to complete today  - continue pain control - oxycodone, tylenol, gapabentin per acute Pain service while inpatient     2  Acute on chronic Combined Systolic and Diastolic Heart Failure   Resolved, required IV diuresis   - TAVR placed 4/18  -ROBB on 6/1/0968 showed systolic function was moderately to markedly reduced  Ejection fraction was estimated to be 35 % with severe pulmonary hypertension  - appears euvolemic on exam today  - continue  home oral diuretic regimen   - ACE-inhibitor on hold with possible procedure    Will resume on discharge  - Appreciate cardiology recommendations      3  CAD status post CABG   - continue ASA 81 mg, Atorvastatin 80 mg, Metoprolol 12 5 mg     4  Paroxysmal Atrial Fibrillation - NSR  - rates well controlled  - Amiodarone 200 mg, lopressor  - Xarelto 20 mg daily on hold for possible procedure  Resume on discharge       5  Type 2 DM  - Metformin and Lantus at home  - Hold Metformin  - Lantus 22 units q a m  and 18 units qhs with sliding scale      6  GERD   - continue pantoprazole 20 mg      7  Microcytic Anemia   - Hgb stable   -Ferrous sulfate every other day      8  Asthma   - Albuterol prn  - Fluticasone 2 puffs q12 hours      9  Seizure Disorder   - continue keppra 750 mg q12 hours      10  Hypothyroidism   - levothyroxine 100 mcg daily     11  HTN   - cont  amlodipine to 10mg daily, lopressor   - Ace inhibitor on hold prior to possible procedure    Disposition:  DC home today with close outpatient follow-up      Subjective:   Patient seen examined at bedside  She says she is feeling rotten  She is in pain in her left foot that was not relieved by her p r n  Oxycodone  We discussed that the plan was that patient would be discharged with outpatient follow-up  Patient would need home VNA  When asked about home VNA patient said she would refuse  I encouraged her to think about it as she would benefit from wound care outpatient  Vitals: Temp (24hrs), Av 9 °F (36 6 °C), Min:97 2 °F (36 2 °C), Max:98 5 °F (36 9 °C)  Current: Temperature: 98 °F (36 7 °C)  Vitals:    18 0710 18 1510 18 2335 18 0708   BP: 150/68 130/63 146/66 130/66   BP Location: Left arm Left arm Left arm Left arm   Pulse: 58 56 (!) 51 (!) 50   Resp:    Temp: 98 °F (36 7 °C) 98 5 °F (36 9 °C) (!) 97 2 °F (36 2 °C) 98 °F (36 7 °C)   TempSrc: Oral Oral Oral Oral   SpO2: 94% 97% 95% 92%   Weight:       Height:        Body mass index is 27 65 kg/m²  I/O last 24 hours:   In: 240 [P O :240]  Out: - Physical Exam:   Physical Exam   Constitutional: She is oriented to person, place, and time  She appears well-developed and well-nourished  No distress  HENT:   Head: Normocephalic and atraumatic  Mouth/Throat: Oropharynx is clear and moist  No oropharyngeal exudate  Eyes: Conjunctivae and EOM are normal  Pupils are equal, round, and reactive to light  No scleral icterus  Cardiovascular: Normal rate, regular rhythm and normal heart sounds  No murmur heard  Pulmonary/Chest: Effort normal and breath sounds normal  No respiratory distress  She has no wheezes  She has no rales  Abdominal: Soft  Bowel sounds are normal  She exhibits no distension  There is no tenderness  There is no guarding  Musculoskeletal: She exhibits tenderness and deformity (Multiple ulcers bilateral feet with dressings)  She exhibits no edema  Neurological: She is alert and oriented to person, place, and time  Skin: Skin is warm and dry  She is not diaphoretic  Psychiatric: She has a normal mood and affect   Her behavior is normal  Judgment and thought content normal        Invasive Devices     Peripheral Intravenous Line            Peripheral IV 08/23/18 Left Forearm less than 1 day                          Labs: Labs personally reviewed    Radiology Results: Imaging personally reviewed    Active Meds:   Current Facility-Administered Medications   Medication Dose Route Frequency    acetaminophen (TYLENOL) tablet 975 mg  975 mg Oral Q8H [address]    albuterol (PROVENTIL HFA,VENTOLIN HFA) inhaler 1 puff  1 puff Inhalation BID PRN    amiodarone tablet 200 mg  200 mg Oral Daily With Breakfast    amLODIPine (NORVASC) tablet 10 mg  10 mg Oral Daily    aspirin chewable tablet 81 mg  81 mg Oral Daily    atorvastatin (LIPITOR) tablet 80 mg  80 mg Oral Daily    ceFAZolin (ANCEF) IVPB (premix) 1,000 mg  1,000 mg Intravenous Q8H    docusate sodium (COLACE) capsule 100 mg  100 mg Oral BID    ferrous sulfate tablet 325 mg  325 mg Oral Every Other Day    fluticasone (FLONASE) 50 mcg/act nasal spray 1 spray  1 spray Nasal Daily    fluticasone (FLOVENT HFA) 110 MCG/ACT inhaler 2 puff  2 puff Inhalation Q12H [address]    furosemide (LASIX) tablet 40 mg  40 mg Oral Daily    gabapentin (NEURONTIN) capsule 100 mg  100 mg Oral BID    insulin glargine (LANTUS) subcutaneous injection 18 Units 0 18 mL  18 Units Subcutaneous HS    insulin glargine (LANTUS) subcutaneous injection 22 Units 0 22 mL  22 Units Subcutaneous QAM    insulin lispro (HumaLOG) 100 units/mL subcutaneous injection 2-12 Units  2-12 Units Subcutaneous 4x Daily (AC & HS)    levETIRAcetam (KEPPRA) tablet 750 mg  750 mg Oral Q12H [address]    levothyroxine tablet 100 mcg  100 mcg Oral Early Morning    loratadine (CLARITIN) tablet 10 mg  10 mg Oral Daily    magnesium oxide (MAG-OX) tablet 400 mg  400 mg Oral BID    metoclopramide (REGLAN) tablet 5 mg  5 mg Oral Q6H PRN    metoprolol tartrate (LOPRESSOR) partial tablet 12 5 mg  12 5 mg Oral Q12H LANCE    nitroglycerin (NITROSTAT) SL tablet 0 4 mg  0 4 mg Sublingual Q3 min PRN    oxyCODONE (ROXICODONE) IR tablet 5 mg  5 mg Oral Q4H PRN    pantoprazole (PROTONIX) EC tablet 20 mg  20 mg Oral Early Morning    polyethylene glycol (MIRALAX) packet 17 g  17 g Oral BID         VTE Pharmacologic Prophylaxis: Reason for no pharmacologic prophylaxis xarelto held for possible procedure   VTE Mechanical Prophylaxis: Bilateral SCDs    Clarice Sánchez DO

## 2018-08-23 NOTE — PROGRESS NOTES
Progress Note - Podiatry  Vilma Kearney [de-identified] y o  female MRN: [id number]  Unit/Bed#: -01 Encounter: [id number]    Assessment:  1  Bilateral foot ischemic ulcerations 2/2 #2: Dry gangrene of right 3rd toe, right medial 1st met head  Left medial hallux, left 2nd digit  2  PAD  3  DM2  4  CKD, stage 3     Plan:  - Wounds appear stable with erythema but no drainage or malodor  Will continue to monitor and provide [address] b/l at this time  WBAT sx shoe b/l   - s/p left agram 08/21 - tibial runoff; DP size decreased from 9/17, vascular discussed with patient options including wound care and pain control vs  Amputation, son will like second opinion   - c/w rest of care per SLIM, ID, vascular  Subjective/Objective   Chief Complaint:   Chief Complaint   Patient presents with    Foot Pain     pt comes from PCP with bilatal foot pain, venous ulcers present on both feet       Subjective: [de-identified] y o  y/o female was seen and evaluated at bedside  Patient was resting in bed comfortably  Patient denies N/V/F/SOB today    Blood pressure 152/75, pulse 55, temperature 98 5 °F (36 9 °C), temperature source Oral, resp  rate 18, height 5' 4" (1 626 m), weight 73 1 kg (161 lb 1 6 oz), SpO2 95 %, not currently breastfeeding  ,Body mass index is 27 65 kg/m²  Invasive Devices     Peripheral Intravenous Line            Peripheral IV 08/23/18 Left Forearm less than 1 day                Physical Exam:   General: Alert, cooperative and no distress  Lungs: Non labored breathing  Heart: Positive S1, S2  Abdomen: Soft, non-tender  Extremity: NVS & MSK function baseline  Right foot: ulcer medial right 1st met head with necrotic wound base, no malodor, no signs of infection, does not probe to bone, muscle, tendon  3rd digit dry stable gangrene with exposed distal phalanx  The right forefoot is erythematous  Left foot: medial 1st met head and left medial hallux with dry adhered eschar  2nd digit distal dry gangrene   No signs of active infection  Left foot edematous and erythematous with pain  Right foot 08/23/18     Right foot 08/23/18      Right foot 08/23/18       Left foot 08/23/18    Left foot 08/23/18            Lab, Imaging and other studies:   CBC: No results found for: WBC, HGB, HCT, MCV, PLT, ADJUSTEDWBC, MCH, MCHC, RDW, MPV, NRBC, CMP: No results found for: NA, K, CL, CO2, ANIONGAP, BUN, CREATININE, GLUCOSE, CALCIUM, AST, ALT, ALKPHOS, PROT, ALBUMIN, BILITOT, EGFR    Imaging: I have personally reviewed pertinent films in PACS  EKG, Pathology, and Other Studies: I have personally reviewed pertinent reports    VTE Pharmacologic Prophylaxis: Heparin

## 2018-08-23 NOTE — PHYSICAL THERAPY NOTE
PT cancellation note    Pt refused physical therapy and to practice stairs due to foot pain  Discussed will attempt later

## 2018-08-23 NOTE — PROGRESS NOTES
Progress Note - Infectious Disease   Jewel Marmolejo [de-identified] y o  female MRN: [id number]  Unit/Bed#: -01 Encounter: [id number]      Impression/Recommendations:  1   Chronic bilateral foot ischemic ulcers with possible left foot cellulitis  Consider chronic osteomyelitis of right 3rd toe  Positive wound cultures likely represent wound colonizers  Clinically stable without sepsis  Residual erythema at this point represents ischemic rubor  Rec:  ? Discontinue antibiotics to complete treatment course  ? Continue [address] per podiatry  ? Await final decision from patient re:palliative [address] versus BKA  ? Follow temperatures closely  ? Supportive care as per the primary service     2   PAD  Status PTA on right  No revascularizatino options on left  Rec:  ? Close vascular surgery follow-up  ? Await final plan from patient (see above)     3   CAD with ICM  High risk for surgery per cardiology  Rec:  ? Close cardiology follow-up ongoing     The patient is stable from an ID standpoint  We will sign off for now  Please call with new questions      Antibiotics:  Cefazolin #7    Subjective:  Patient seen on AM rounds  Feels lousy today but can't specify  Complains that left foot hurts  Denies fevers, chills, sweats, nausea, vomiting, or diarrhea  24 Hour Events:  No documented fevers, chills, sweats, nausea, vomiting, or diarrhea  Objective:  Vitals:  HR:  [50-56] 50  Resp:  [18] 18  BP: (130-146)/(63-66) 130/66  SpO2:  [92 %-97 %] 92 %  Temp (24hrs), Av 9 °F (36 6 °C), Min:97 2 °F (36 2 °C), Max:98 5 °F (36 9 °C)  Current: Temperature: 98 °F (36 7 °C)    Physical Exam:   General:  No acute distress  Psychiatric:  Awake and alert  Pulmonary:  Normal respiratory excursion without accessory muscle use  Abdomen:  Soft, nontender  Extremities:  Cool erythema left distal foot which completely resolves with leg elevation    Stable dry ulcer over hallux  Skin:  No rashes    Lab Results:  I have personally reviewed pertinent labs     Results from last 7 days  Lab Units 08/22/18  0528 08/21/18  0500 08/18/18  0545   SODIUM mmol/L 134* 132* 138   POTASSIUM mmol/L 3 7 4 3 4 3   CHLORIDE mmol/L 98* 99* 102   CO2 mmol/L 29 28 27   ANION GAP mmol/L 7 5 9   BUN mg/dL 27* 16 13   CREATININE mg/dL 0 99 0 97 0 78   EGFR ml/min/1 73sq m 54 55 72   GLUCOSE RANDOM mg/dL 182* 205* 84   CALCIUM mg/dL 8 5 8 6 8 9       Results from last 7 days  Lab Units 08/21/18  0500 08/18/18  1409   WBC Thousand/uL 10 93* 10 53*   HEMOGLOBIN g/dL 9 9* 9 1*   PLATELETS Thousands/uL 292 229           Imaging Studies:   I have personally reviewed pertinent imaging study reports and images in PACS  EKG, Pathology, and Other Studies:   I have personally reviewed pertinent reports

## 2018-08-23 NOTE — PROGRESS NOTES
Cardiology Progress Note - Germania Box [de-identified] y o  female MRN: [id number]    Unit/Bed#: -01 Encounter: [id number]    Assessment and plan  1  Coronary artery disease stable without angina  2  Atrial flutter currently in sinus rhythm  3  Chronic combined systolic and diastolic congestive heart failure  4  Hypertension  5  Hyperlipidemia  6  History of CVA  7  CKD  8  Peripheral Arterial disease   9  Cellulitis     Recommendations:  Coronary disease stable with no active angina  overall patient is doing well from a heart failure standpoint and is euvolemic on home diuretic dosing  Family would like a 2nd opinion regarding amputation  Would resume anticoagulation until final plans are decided upon  Can follow up in the cardiology office  Subjective:    No significant events overnight  Denies chest pain, shortness of breath, palpitations, lightheadedness, dizziness, or syncope  ROS    Objective:   Vitals: Blood pressure 130/66, pulse (!) 50, temperature 98 °F (36 7 °C), temperature source Oral, resp  rate 18, height 5' 4" (1 626 m), weight 73 1 kg (161 lb 1 6 oz), SpO2 92 %, not currently breastfeeding , Body mass index is 27 65 kg/m² , Orthostatic Blood Pressures      Most Recent Value   Blood Pressure  130/66 filed at 08/23/2018 0708   Patient Position - Orthostatic VS  Lying filed at 08/23/2018 8402         Systolic (40QQD), NLD:880 , Min:130 , TDH:861     Diastolic (75WEV), MPU:61, Min:63, Max:66      Intake/Output Summary (Last 24 hours) at 08/23/18 0849  Last data filed at 08/22/18 1413   Gross per 24 hour   Intake              240 ml   Output                0 ml   Net              240 ml     Weight (last 2 days)     None            Telemetry Review: No significant arrhythmias seen on telemetry review  EKG personally reviewed by Kiarra Rogel DO  Physical Exam   Constitutional: She is oriented to person, place, and time  She appears well-nourished  No distress     HENT: Head: Atraumatic  Eyes: Conjunctivae are normal  Pupils are equal, round, and reactive to light  Neck: Neck supple  Cardiovascular: Normal rate and regular rhythm  Exam reveals no friction rub  Murmur heard  Pulmonary/Chest: Effort normal and breath sounds normal  No respiratory distress  She has no wheezes  She has no rales  Abdominal: Bowel sounds are normal  She exhibits no distension  There is no tenderness  There is no rebound  Musculoskeletal: She exhibits no edema or deformity  Neurological: She is alert and oriented to person, place, and time  No cranial nerve deficit  Skin: Skin is warm and dry  No rash noted  There is erythema  Nursing note and vitals reviewed  Laboratory Results:        CBC with diff:   Results from last 7 days  Lab Units 08/21/18  0500 08/18/18  1409   WBC Thousand/uL 10 93* 10 53*   HEMOGLOBIN g/dL 9 9* 9 1*   HEMATOCRIT % 31 9* 29 2*   MCV fL 76* 77*   PLATELETS Thousands/uL 292 229   MCH pg 23 6* 24 1*   MCHC g/dL 31 0* 31 2*   RDW % 17 4* 17 5*   MPV fL 10 3 10 1   NRBC AUTO /100 WBCs 0  --          CMP:  Results from last 7 days  Lab Units 08/22/18  0528 08/21/18  0500 08/18/18  0545   SODIUM mmol/L 134* 132* 138   POTASSIUM mmol/L 3 7 4 3 4 3   CHLORIDE mmol/L 98* 99* 102   CO2 mmol/L 29 28 27   ANION GAP mmol/L 7 5 9   BUN mg/dL 27* 16 13   CREATININE mg/dL 0 99 0 97 0 78   GLUCOSE RANDOM mg/dL 182* 205* 84   CALCIUM mg/dL 8 5 8 6 8 9   EGFR ml/min/1 73sq m 54 55 72         BMP:  Results from last 7 days  Lab Units 08/22/18  0528 08/21/18  0500 08/18/18  0545   SODIUM mmol/L 134* 132* 138   POTASSIUM mmol/L 3 7 4 3 4 3   CHLORIDE mmol/L 98* 99* 102   CO2 mmol/L 29 28 27   BUN mg/dL 27* 16 13   CREATININE mg/dL 0 99 0 97 0 78   GLUCOSE RANDOM mg/dL 182* 205* 84   CALCIUM mg/dL 8 5 8 6 8 9       BNP: No results for input(s): BNP in the last 72 hours      Magnesium:       Coags:   Results from last 7 days  Lab Units 08/20/18  0610 08/18/18  0545   PTT seconds  --  29   INR  1 18* 1 37*       TSH:        Hemoglobin A1C       Lipid Profile:       Cardiac testing:   Results for orders placed during the hospital encounter of 18   Echo complete with contrast if indicated    Narrative Sergio Duncan Larry, [address] [facility]  [phone]    Transthoracic Echocardiogram  2D, M-mode, Doppler, and Color Doppler    Study date:  01-May-2018    Patient: Reanna Boyle  MR number: [id number]  Account number: [de-identified]  : 1940  Age: [de-identified] years  Gender: Female  Status: Inpatient  Location: Bedside  Height: 64 in  Weight: 161 7 lb  BP: 171/ 83 mmHg    Indications: Aortic Valve Disorder Evaluate prosthetic aortic valve  Evaluate prosthetic mitral valve  Diagnoses: I35 9 - Nonrheumatic aortic valve disorder, unspecified    Sonographer:  Corina Hurtado RDCS  Primary Physician:  Letitia Joyce MD  Group:  [facility]  Interpreting Physician:  Dustin James MD    SUMMARY    LEFT VENTRICLE:  The ventricle was mildly dilated  Systolic function was moderately to markedly reduced  Ejection fraction was estimated to be 35 %  There was moderate diffuse hypokinesis with distinct regional wall motion abnormalities  More severe hypokinesis to akinesis was seen in the inferoseptal and inferior walls  Wall thickness was mildly increased  VENTRICULAR SEPTUM:  There was moderate dyssynergic motion  These changes are consistent with LBBB  RIGHT VENTRICLE:  The ventricle was moderately dilated  Systolic function was mildly reduced  LEFT ATRIUM:  The atrium was moderately dilated  RIGHT ATRIUM:  The atrium was moderately dilated  MITRAL VALVE:  There was marked annular calcification  There was moderate diffuse thickening  There was moderately reduced leaflet separation  Transmitral velocity was increased due to valvular stenosis  There was moderate stenosis    There was mild regurgitation  AORTIC VALVE:  A bioprosthesis was present  It exhibited normal function  There was no significant perivalvular regurgitation  Valve mean gradient was 8 mmHg  TRICUSPID VALVE:  There was moderate regurgitation  Pulmonary artery systolic pressure was markedly increased  Estimated peak PA pressure was 70 mmHg  IVC, HEPATIC VEINS:  The inferior vena cava was dilated  HISTORY: PRIOR HISTORY: TAVR, Mitral Stenosis, CABG, MI, HTN, Asthma, CHF, DM, CVA, CAD, HLD    PROCEDURE: The procedure was performed at the bedside  This was a routine study  The transthoracic approach was used  The study included complete 2D imaging, M-mode, complete spectral Doppler, and color Doppler  The heart rate was 100 bpm,  at the start of the study  Images were obtained from the parasternal, apical, subcostal, and suprasternal notch acoustic windows  Echocardiographic views were limited due to restricted patient mobility, poor patient compliance, and lung  interference  This was a technically difficult study  LEFT VENTRICLE: The ventricle was mildly dilated  Systolic function was moderately to markedly reduced  Ejection fraction was estimated to be 35 %  There was moderate diffuse hypokinesis with distinct regional wall motion abnormalities  More severe hypokinesis to akinesis was seen in the inferoseptal and inferior walls  Wall thickness was mildly increased  DOPPLER: The study was not technically sufficient to allow evaluation of LV diastolic function  VENTRICULAR SEPTUM: Thickness was mildly increased  There was moderate dyssynergic motion  These changes are consistent with LBBB  RIGHT VENTRICLE: The ventricle was moderately dilated  Systolic function was mildly reduced  Wall thickness was normal     LEFT ATRIUM: The atrium was moderately dilated  RIGHT ATRIUM: The atrium was moderately dilated  MITRAL VALVE: There was marked annular calcification  There was moderate diffuse thickening   There was moderately reduced leaflet separation  DOPPLER: Transmitral velocity was increased due to valvular stenosis  There was moderate  stenosis  There was mild regurgitation  AORTIC VALVE: A bioprosthesis was present  It exhibited normal function  DOPPLER: Transaortic velocity was within the normal range  There was no evidence for stenosis  There was no significant regurgitation  There was no significant  perivalvular regurgitation  TRICUSPID VALVE: The valve structure was normal  DOPPLER: The transtricuspid velocity was within the normal range  There was moderate regurgitation  Pulmonary artery systolic pressure was markedly increased  Estimated peak PA pressure was  70 mmHg  PULMONIC VALVE: Not well visualized  DOPPLER: The transpulmonic velocity was within the normal range  There was no regurgitation  PERICARDIUM: There was no pericardial effusion  AORTA: The root exhibited normal size  The ascending aorta was normal in size  SYSTEMIC VEINS: IVC: The inferior vena cava was dilated  PULMONARY VEINS: DOPPLER: Doppler signals were not recordable in the pulmonary vein(s)  MEASUREMENT TABLES    DOPPLER MEASUREMENTS  Aortic valve   (Reference normals)  Mean gradient   8 mmHg   (--)    SYSTEM MEASUREMENT TABLES    2D  %FS: 15 07 %  Ao Diam: 2 75 cm  EDV(Teich): 139 25 ml  EF(Teich): 31 62 %  ESV(Teich): 95 22 ml  IVSd: 1 2 cm  LA Area: 23 57 cm2  LA Diam: 4 16 cm  LVEDV MOD A4C: 93 35 ml  LVEF MOD A4C: 41 28 %  LVESV MOD A4C: 54 81 ml  LVIDd: 5 37 cm  LVIDs: 4 56 cm  LVLd A4C: 7 64 cm  LVLs A4C: 6 64 cm  LVOT Diam: 1 9 cm  LVPWd: 1 21 cm  RA Area: 25 62 cm2  RVIDd: 5 25 cm  SV MOD A4C: 38 53 ml  SV(Teich): 44 03 ml    CW  AV Env  Ti: 318 34 ms  AV VTI: 47 91 cm  AV Vmax: 2 36 m/s  AV Vmean: 1 5 m/s  AV maxP 29 mmHg  AV meanPG: 10 48 mmHg  TR Vmax: 3 84 m/s  TR maxP 13 mmHg    MM  TAPSE: 1 4 cm    PW  CHER (VTI): 1 05 cm2  CHER Vmax: 1 03 cm2  E': 0 03 m/s  E/E': 81 41  HR: 173 4 BPM  LVOT Env Ti: 332 18 ms  LVOT VTI: 17 78 cm  LVOT Vmax: 0 86 m/s  LVOT Vmean: 0 54 m/s  LVOT maxP 95 mmHg  LVOT meanP 37 mmHg  LVSI Dopp: 28 05 ml/m2  LVSV Dopp: 50 2 ml  MV A Jayant: 2 32 m/s  MV Dec Erath: 11 07 m/s2  MV DecT: 201 33 ms  MV E Jayant: 2 23 m/s  MV E/A Ratio: 0 96  MV PHT: 58 39 ms  MV VTI: 50 85 cm  MV Vmax: 2 29 m/s  MV Vmean: 1 49 m/s  MV maxP 89 mmHg  MV meanPG: 10 01 mmHg  MVA (VTI): 0 99 cm2  MVA By PHT: 3 77 cm2    [facility]    Prepared and electronically signed by    Mary Ellen Thomas MD  Signed [id number] 15:08:38       Results for orders placed during the hospital encounter of 18   ROBB    Narrative [address]  [address], [address] [facility]  [phone]    Transesophageal Echocardiogram  2D, Doppler, and Color Doppler    Study date:  2018    Patient: Natanael Clemente  MR number: [id number]  Account number: [de-identified]  : 1940  Age: [de-identified] years  Gender: Female  Status: Inpatient  Location: Echo lab  Height: 64 in  Weight: 167 lb  BP: 116/ 52 mmHg    Indications: CVA    Diagnoses: R55  - Syncope and collapse    Sonographer:  SIOBHAN Bruce  Primary Physician:  Lila Renee MD  Referring Physician:  Carol Ann Brar MD  Group:  [facility]  Interpreting Physician:  Han Espino MD    SUMMARY    LEFT VENTRICLE:  Systolic function was normal  Ejection fraction was estimated to be 55 %  Although no diagnostic regional wall motion abnormality was identified, this possibility cannot be completely excluded on the basis of this study  Wall thickness was increased  Concentric hypertrophy was present  LEFT ATRIAL APPENDAGE:  No thrombus was identified  ATRIAL SEPTUM:  The septum bows from left to right, consistent with increased left atrial pressure    No defect or patent foramen ovale was identified by color Doppler or after injection of agitated saline  MITRAL VALVE:  There was moderate annular calcification  There was mild to moderate stenosis  Vmax 1 7 m/s, mean gradient 5 6 mm Hg, maximum gradient 11 mm Hg at a HR of 78 BPM   There was mild regurgitation  AORTIC VALVE:  A bioprosthesis was present  It exhibited normal function  TRICUSPID VALVE:  There was moderate to severe regurgitation  Estimated peak PA pressure was 55 mmHg  The findings suggest moderate pulmonary hypertension  HISTORY: PRIOR HISTORY: TAVR, Mitral Stenosis , CABG, MI, Hypertension, Asthma, CHF    PROCEDURE: The procedure was performed in the echo lab  This was a routine study  The risks and alternatives of the procedure were explained to the patient and informed consent was obtained  The transesophageal approach was used  The study  included complete 2D imaging, limited spectral Doppler, and color Doppler  The heart rate was 74 bpm, at the start of the study  An adult omniplane probe was inserted by the attending cardiologist  Intubated with ease  One intubation  attempt(s)  There was no blood detected on the probe  Image quality was adequate  There were no complications during the procedure  MEDICATIONS: Anesthesia administered by anesthesia team     LEFT VENTRICLE: Size was normal  Systolic function was normal  Ejection fraction was estimated to be 55 %  Although no diagnostic regional wall motion abnormality was identified, this possibility cannot be completely excluded on the basis  of this study  Wall thickness was increased  Concentric hypertrophy was present  RIGHT VENTRICLE: The size was normal  Systolic function was normal     LEFT ATRIUM: The atrium was dilated  No thrombus was identified  APPENDAGE: The appendage was dilated  No thrombus was identified  DOPPLER: The function was reduced (reduced emptying velocity)  ATRIAL SEPTUM: The septum bows from left to right, consistent with increased left atrial pressure   No defect or patent foramen ovale was identified by color Doppler or after injection of agitated saline  MITRAL VALVE: There was moderate annular calcification  There was mild-moderate calcification  There was mildly reduced leaflet separation  DOPPLER: There was mild to moderate stenosis  Vmax 1 7 m/s, mean gradient 5 6 mm Hg, maximum  gradient 11 mm Hg at a HR of 78 BPM  There was mild regurgitation  AORTIC VALVE: A bioprosthesis was present  It exhibited normal function  DOPPLER: There was no evidence for stenosis  There was no significant regurgitation  TRICUSPID VALVE: The valve structure was normal  There was normal leaflet separation  There was no echocardiographic evidence of vegetation  DOPPLER: There was moderate to severe regurgitation  Estimated peak PA pressure was 55 mmHg  The  findings suggest moderate pulmonary hypertension  PULMONIC VALVE: Leaflets exhibited normal thickness, no calcification, and normal cuspal separation  There was no echocardiographic evidence of vegetation  PERICARDIUM: There was no pericardial effusion  The pericardium was normal in appearance  AORTA: The root exhibited normal size  There was no atheroma  There was no evidence for dissection  There was no evidence for aneurysm  [address] Accredited Echocardiography Laboratory    Prepared and electronically signed by    Zamzam Jaramillo MD  Signed 18-Apr-2018 14:48:57       No results found for this or any previous visit  No results found for this or any previous visit      Meds/Allergies   all current active meds have been reviewed  Prescriptions Prior to Admission   Medication    amiodarone 200 mg tablet    amLODIPine (NORVASC) 5 mg tablet    aspirin 81 MG tablet    atorvastatin (LIPITOR) 80 mg tablet    Cholecalciferol (VITAMIN D3) 1000 units CHEW    esomeprazole (NEXIUM) 20 mg capsule    ferrous sulfate 325 (65 Fe) mg tablet    fluticasone (FLONASE) 50 mcg/act nasal spray    furosemide (LASIX) 40 mg tablet    glucose blood test strip    Insulin Syringe-Needle U-100 (BD INSULIN SYRINGE ULTRAFINE) 31G X 5/16" 1 ML MISC    LANTUS 100 UNIT/ML subcutaneous injection    levETIRAcetam (KEPPRA) 750 mg tablet    levothyroxine 100 mcg tablet    lisinopril (ZESTRIL) 5 mg tablet    loratadine (CLARITIN) 10 mg tablet    magnesium oxide (MAG-OX) 400 mg    metFORMIN (GLUCOPHAGE) 1000 MG tablet    metoclopramide (REGLAN) 10 mg tablet    Mometasone Furoate (ASMANEX HFA) 100 MCG/ACT AERO    pantoprazole (PROTONIX) 40 mg tablet    potassium chloride (K-DUR,KLOR-CON) 10 mEq tablet    rivaroxaban (XARELTO) 20 mg tablet    acetaminophen (TYLENOL) 650 mg CR tablet    albuterol (VENTOLIN HFA) 90 mcg/act inhaler    calcium carbonate (TUMS) 500 mg chewable tablet    cholecalciferol (VITAMIN D3) 1,000 units tablet    diphenhydrAMINE (BENADRYL) 50 mg capsule    methylPREDNISolone (MEDROL) 32 MG tablet    nitroglycerin (NITROSTAT) 0 4 mg SL tablet    phenazopyridine (PYRIDIUM) 200 mg tablet    polyethylene glycol (MIRALAX) 17 g packet          Assessment:  Principal Problem:    Cellulitis  Active Problems:    Essential hypertension    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 native heart without angina pectoris    Type 2 diabetes mellitus with complication, with long-term current use of insulin (HCC)    Atrial flutter (HCC)    Hyperlipidemia    Gastroesophageal reflux disease without esophagitis    Chronic combined systolic and diastolic congestive heart failure (HCC)    Paroxysmal atrial fibrillation (HCC)    Ulcer of toe of left foot (HCC)    Ulcer of toe of right foot (HCC)    History of CVA (cerebrovascular accident)    Seizure ([address] )    Anemia    PAD (peripheral artery disease) ([facility])    CKD (chronic kidney disease) stage 3, GFR 30-59 ml/min

## 2018-08-23 NOTE — SOCIAL WORK
CM informed by resident, Dr Sim Gaston for pt to be ready to d/c today or tomorrow  CM spoke with pt son, Preston Beltran, to discuss dcp  CM discussed PT recommendation of home with family support  As per Preston Beltran his preference is for pt to go to [facility]  Preston Beltran does not feel it is safe for pt to d/c home because she lives a lone and could use more PT/OT  CM reviewed referrals that were made  As per Preston Beltran preference is [facility], [facility], or [facility]  No [facility]  CM updated referrals  Preston Beltran requested to speak with the physician due to some concerns regarding second opinion for Surgery and pt complaining of pain  CM notified Dr Obrien Sons of same  CM spoke with Ju with [facility] who informed CM they are able to accept pt today or tomorrow  If pt is d/c early tomorrow [facility] able to transport  CM spoke with both pt and her son Preston Beltran who agree to d/c to [facility] when medically stable  Preston Beltran reports he was able to speak with medical team and will talk with pt tonight regarding options for surgery  Ezekiel aware pt will d/c to [facility] and return to the hospital for surgery  CM also discussed long term planing with Preston Beltran  As per Preston Beltran he is requesting information on waiver program for [facility]  CM printed step by step process off of the website and also provided [facility] CO information and referral phone number for additional resources for pt   [facility]

## 2018-08-23 NOTE — PLAN OF CARE
Problem: DISCHARGE PLANNING - CARE MANAGEMENT  Goal: Discharge to post-acute care or home with appropriate resources  INTERVENTIONS:  - Conduct assessment to determine patient/family and health care team treatment goals, and need for post-acute services based on payer coverage, community resources, and patient preferences, and barriers to discharge  - Address psychosocial, clinical, and financial barriers to discharge as identified in assessment in conjunction with the patient/family and health care team  - Arrange appropriate level of post-acute services according to patient's   needs and preference and payer coverage in collaboration with the physician and health care team  - Communicate with and update the patient/family, physician, and health care team regarding progress on the discharge plan  - Arrange appropriate transportation to post-acute venues  - pt to d/c to [facility] when medically stable    Outcome: Progressing

## 2018-08-23 NOTE — PLAN OF CARE
Discharge to post-acute care or home with appropriate resources Progressing      Nutrient/Hydration intake appropriate for improving, restoring or maintaining nutritional needs Progressing      Patient will remain free of falls Progressing      Skin integrity is maintained or improved Progressing      Skin integrity is maintained or improved Progressing

## 2018-08-24 ENCOUNTER — APPOINTMENT (INPATIENT)
Dept: NON INVASIVE DIAGNOSTICS | Facility: HOSPITAL | Age: 78
DRG: 252 | End: 2018-08-24
Payer: MEDICARE

## 2018-08-24 VITALS
HEIGHT: 64 IN | OXYGEN SATURATION: 97 % | HEART RATE: 50 BPM | DIASTOLIC BLOOD PRESSURE: 71 MMHG | TEMPERATURE: 97.7 F | WEIGHT: 161.1 LBS | BODY MASS INDEX: 27.5 KG/M2 | RESPIRATION RATE: 16 BRPM | SYSTOLIC BLOOD PRESSURE: 161 MMHG

## 2018-08-24 LAB
ANION GAP SERPL CALCULATED.3IONS-SCNC: 6 MMOL/L (ref 4–13)
BUN SERPL-MCNC: 21 MG/DL (ref 5–25)
CALCIUM SERPL-MCNC: 8.4 MG/DL (ref 8.3–10.1)
CHLORIDE SERPL-SCNC: 101 MMOL/L (ref 100–108)
CO2 SERPL-SCNC: 29 MMOL/L (ref 21–32)
CREAT SERPL-MCNC: 0.82 MG/DL (ref 0.6–1.3)
ERYTHROCYTE [DISTWIDTH] IN BLOOD BY AUTOMATED COUNT: 17.2 % (ref 11.6–15.1)
GFR SERPL CREATININE-BSD FRML MDRD: 68 ML/MIN/1.73SQ M
GLUCOSE SERPL-MCNC: 103 MG/DL (ref 65–140)
GLUCOSE SERPL-MCNC: 115 MG/DL (ref 65–140)
GLUCOSE SERPL-MCNC: 129 MG/DL (ref 65–140)
HCT VFR BLD AUTO: 31.3 % (ref 34.8–46.1)
HGB BLD-MCNC: 9.3 G/DL (ref 11.5–15.4)
MCH RBC QN AUTO: 23.5 PG (ref 26.8–34.3)
MCHC RBC AUTO-ENTMCNC: 29.7 G/DL (ref 31.4–37.4)
MCV RBC AUTO: 79 FL (ref 82–98)
PLATELET # BLD AUTO: 305 THOUSANDS/UL (ref 149–390)
PMV BLD AUTO: 10.2 FL (ref 8.9–12.7)
POTASSIUM SERPL-SCNC: 3.8 MMOL/L (ref 3.5–5.3)
RBC # BLD AUTO: 3.95 MILLION/UL (ref 3.81–5.12)
SODIUM SERPL-SCNC: 136 MMOL/L (ref 136–145)
WBC # BLD AUTO: 10.86 THOUSAND/UL (ref 4.31–10.16)

## 2018-08-24 PROCEDURE — 97116 GAIT TRAINING THERAPY: CPT

## 2018-08-24 PROCEDURE — 97530 THERAPEUTIC ACTIVITIES: CPT

## 2018-08-24 PROCEDURE — 82948 REAGENT STRIP/BLOOD GLUCOSE: CPT

## 2018-08-24 PROCEDURE — 93971 EXTREMITY STUDY: CPT

## 2018-08-24 PROCEDURE — 93971 EXTREMITY STUDY: CPT | Performed by: SURGERY

## 2018-08-24 PROCEDURE — 80048 BASIC METABOLIC PNL TOTAL CA: CPT | Performed by: INTERNAL MEDICINE

## 2018-08-24 PROCEDURE — 85027 COMPLETE CBC AUTOMATED: CPT | Performed by: INTERNAL MEDICINE

## 2018-08-24 PROCEDURE — 99239 HOSP IP/OBS DSCHRG MGMT >30: CPT | Performed by: INTERNAL MEDICINE

## 2018-08-24 RX ORDER — AMLODIPINE BESYLATE 10 MG/1
10 TABLET ORAL DAILY
Qty: 30 TABLET | Refills: 0 | Status: SHIPPED | OUTPATIENT
Start: 2018-08-24

## 2018-08-24 RX ORDER — METOPROLOL SUCCINATE 25 MG/1
12.5 TABLET, EXTENDED RELEASE ORAL DAILY
Status: DISCONTINUED | OUTPATIENT
Start: 2018-08-25 | End: 2018-08-24

## 2018-08-24 RX ORDER — HEPARIN SODIUM 5000 [USP'U]/ML
5000 INJECTION, SOLUTION INTRAVENOUS; SUBCUTANEOUS EVERY 8 HOURS SCHEDULED
Status: DISCONTINUED | OUTPATIENT
Start: 2018-08-24 | End: 2018-08-24

## 2018-08-24 RX ORDER — ACETAMINOPHEN 325 MG/1
975 TABLET ORAL EVERY 8 HOURS SCHEDULED
Qty: 180 TABLET | Refills: 0 | Status: SHIPPED | OUTPATIENT
Start: 2018-08-24 | End: 2018-09-07

## 2018-08-24 RX ORDER — LISINOPRIL 10 MG/1
10 TABLET ORAL DAILY
Qty: 30 TABLET | Refills: 0 | Status: ON HOLD | OUTPATIENT
Start: 2018-08-24 | End: 2018-12-10

## 2018-08-24 RX ORDER — METOPROLOL SUCCINATE 25 MG/1
12.5 TABLET, EXTENDED RELEASE ORAL
Status: DISCONTINUED | OUTPATIENT
Start: 2018-08-24 | End: 2018-08-24 | Stop reason: HOSPADM

## 2018-08-24 RX ORDER — FERROUS SULFATE 325(65) MG
325 TABLET ORAL EVERY OTHER DAY
Qty: 30 TABLET | Refills: 0 | Status: ON HOLD | OUTPATIENT
Start: 2018-08-26 | End: 2018-12-05 | Stop reason: ALTCHOICE

## 2018-08-24 RX ORDER — DOCUSATE SODIUM 100 MG/1
100 CAPSULE, LIQUID FILLED ORAL 2 TIMES DAILY
Qty: 60 CAPSULE | Refills: 0 | Status: SHIPPED | OUTPATIENT
Start: 2018-08-24

## 2018-08-24 RX ORDER — GABAPENTIN 100 MG/1
100 CAPSULE ORAL 3 TIMES DAILY
Qty: 90 CAPSULE | Refills: 0 | Status: ON HOLD | OUTPATIENT
Start: 2018-08-24 | End: 2018-09-24

## 2018-08-24 RX ORDER — FENTANYL 25 UG/H
1 PATCH TRANSDERMAL
Qty: 10 PATCH | Refills: 0 | Status: ON HOLD
Start: 2018-08-26 | End: 2018-09-24

## 2018-08-24 RX ORDER — OXYCODONE HYDROCHLORIDE 5 MG/1
5 TABLET ORAL EVERY 4 HOURS PRN
Qty: 30 TABLET | Refills: 0
Start: 2018-08-24 | End: 2018-09-03

## 2018-08-24 RX ORDER — METOPROLOL SUCCINATE 25 MG/1
12.5 TABLET, EXTENDED RELEASE ORAL
Qty: 30 TABLET | Refills: 0 | Status: SHIPPED | OUTPATIENT
Start: 2018-08-24 | End: 2018-09-07

## 2018-08-24 RX ORDER — GABAPENTIN 100 MG/1
100 CAPSULE ORAL 3 TIMES DAILY
Status: DISCONTINUED | OUTPATIENT
Start: 2018-08-24 | End: 2018-08-24 | Stop reason: HOSPADM

## 2018-08-24 RX ADMIN — FLUTICASONE PROPIONATE 1 SPRAY: 50 SPRAY, METERED NASAL at 09:01

## 2018-08-24 RX ADMIN — AMIODARONE HYDROCHLORIDE 200 MG: 200 TABLET ORAL at 08:49

## 2018-08-24 RX ADMIN — INSULIN GLARGINE 22 UNITS: 100 INJECTION, SOLUTION SUBCUTANEOUS at 08:58

## 2018-08-24 RX ADMIN — DOCUSATE SODIUM 100 MG: 100 CAPSULE, LIQUID FILLED ORAL at 08:50

## 2018-08-24 RX ADMIN — LEVOTHYROXINE SODIUM 100 MCG: 100 TABLET ORAL at 04:59

## 2018-08-24 RX ADMIN — PANTOPRAZOLE SODIUM 20 MG: 20 TABLET, DELAYED RELEASE ORAL at 04:59

## 2018-08-24 RX ADMIN — ATORVASTATIN CALCIUM 80 MG: 80 TABLET, FILM COATED ORAL at 08:51

## 2018-08-24 RX ADMIN — Medication 400 MG: at 08:51

## 2018-08-24 RX ADMIN — FLUTICASONE PROPIONATE 2 PUFF: 110 AEROSOL, METERED RESPIRATORY (INHALATION) at 09:01

## 2018-08-24 RX ADMIN — FERROUS SULFATE TAB 325 MG (65 MG ELEMENTAL FE) 325 MG: 325 (65 FE) TAB at 08:51

## 2018-08-24 RX ADMIN — OXYCODONE HYDROCHLORIDE 5 MG: 5 TABLET ORAL at 04:58

## 2018-08-24 RX ADMIN — ASPIRIN 81 MG 81 MG: 81 TABLET ORAL at 08:52

## 2018-08-24 RX ADMIN — AMLODIPINE BESYLATE 10 MG: 10 TABLET ORAL at 08:51

## 2018-08-24 RX ADMIN — ACETAMINOPHEN 975 MG: 325 TABLET, FILM COATED ORAL at 13:04

## 2018-08-24 RX ADMIN — HEPARIN SODIUM 5000 UNITS: 5000 INJECTION, SOLUTION INTRAVENOUS; SUBCUTANEOUS at 08:58

## 2018-08-24 RX ADMIN — FUROSEMIDE 40 MG: 40 TABLET ORAL at 08:51

## 2018-08-24 RX ADMIN — POLYETHYLENE GLYCOL 3350 17 G: 17 POWDER, FOR SOLUTION ORAL at 08:50

## 2018-08-24 RX ADMIN — LORATADINE 10 MG: 10 TABLET ORAL at 08:51

## 2018-08-24 RX ADMIN — OXYCODONE HYDROCHLORIDE 5 MG: 5 TABLET ORAL at 09:06

## 2018-08-24 RX ADMIN — GABAPENTIN 100 MG: 100 CAPSULE ORAL at 08:50

## 2018-08-24 RX ADMIN — RIVAROXABAN 20 MG: 20 TABLET, FILM COATED ORAL at 13:04

## 2018-08-24 RX ADMIN — ACETAMINOPHEN 975 MG: 325 TABLET, FILM COATED ORAL at 04:59

## 2018-08-24 RX ADMIN — LEVETIRACETAM 750 MG: 750 TABLET ORAL at 08:51

## 2018-08-24 RX ADMIN — OXYCODONE HYDROCHLORIDE 5 MG: 5 TABLET ORAL at 13:05

## 2018-08-24 NOTE — PROGRESS NOTES
Progress Note - Vascular Surgery   Fran Llamas [de-identified] y o  female MRN: [id number]  Unit/Bed#: -01 Encounter: [id number]    Assessment:  [de-identified] F with PAD, dry gangrene on b/l 1st toes, L 2nd toe, R 3rd toe with L foot edema and erythema    - Angiogram 8/21 - tibial runoff; DP size decreased from 9/17, luminal defect seen in L CFA  - Wound care vs amputation discussion w/ son who is interested in pursuing a 2nd opinion regarding BKA at this time  - Patient with contrast allergy    Plan:  - Discussed possible L pop-plantar bypass w/ patient  Patient understands she is high risk and wishes to proceed with bypass given the alternative of amputation   - Unilateral LLE venous duplex  - Continue Diet as tolerated  - Holding Xarelto, restart Heparin  - Pain control  - Appreciate Cards recs  - Appreciate wound care    Subjective/Objective   Chief Complaint:     Subjective: No acute events overnight  Patient states that her pain is improved this morning  She is complaining of left leg swelling greater than the right  She is tolerating a regular diet  No other complaints this AM  Denies f/c/n/v/cp/sob  Objective:     Blood pressure 151/68, pulse 56, temperature 98 3 °F (36 8 °C), temperature source Oral, resp  rate 20, height 5' 4" (1 626 m), weight 73 1 kg (161 lb 1 6 oz), SpO2 92 %, not currently breastfeeding  ,Body mass index is 27 65 kg/m²  Intake/Output Summary (Last 24 hours) at 08/24/18 0510  Last data filed at 08/23/18 1303   Gross per 24 hour   Intake              360 ml   Output                0 ml   Net              360 ml       Invasive Devices     Peripheral Intravenous Line            Peripheral IV 08/23/18 Left Forearm 1 day                Physical Exam: General: AAOx3  Respiratory: BS b/l  Abdomen: Soft, NT, no rebound/guarding  Heart: RRR, S1s2  Ext: Left great toe with dry gangrene ulcer on medial plantar surface  Cellulitis improving  Edema L>R  No calf tenderness  No pain on dorsiflexion      Lab, Imaging and other studies:    8/21 IR Angiogram LLE - Impression:      1  Severe multivessel disease in the left calf, not amenable to endovascular intervention  Patent left superficial femoral and popliteal arteries with diffuse nonflow limiting disease        2  Focal filling defect in the left common femoral artery possibly related to prior access and closure      Plan: No intervention at this time, return to wound care  Follow-up imaging of the left common femoral artery  I have personally reviewed pertinent lab results    ,   Lab Results   Component Value Date    GLUCOSE 182 (H) 08/22/2018    CALCIUM 8 5 08/22/2018     (L) 08/22/2018    K 3 7 08/22/2018    CO2 29 08/22/2018    CL 98 (L) 08/22/2018    BUN 27 (H) 08/22/2018    CREATININE 0 99 08/22/2018     Lab Results   Component Value Date    WBC 10 93 (H) 08/21/2018    HGB 9 9 (L) 08/21/2018    HCT 31 9 (L) 08/21/2018    MCV 76 (L) 08/21/2018     08/21/2018       VTE Pharmacologic Prophylaxis: Reason for no pharmacologic prophylaxis Xarelto held  VTE Mechanical Prophylaxis: sequential compression device

## 2018-08-24 NOTE — PLAN OF CARE
Problem: PHYSICAL THERAPY ADULT  Goal: Performs mobility at highest level of function for planned discharge setting  See evaluation for individualized goals  Treatment/Interventions: Functional transfer training, LE strengthening/ROM, Therapeutic exercise, Endurance training, Patient/family training, Equipment eval/education, Bed mobility, Gait training, Spoke to nursing, Spoke to case management  Equipment Recommended: Brittney Vargas       See flowsheet documentation for full assessment, interventions and recommendations  Outcome: Progressing  Prognosis: Good (if pain is managed )  Problem List: Decreased endurance, Impaired balance, Decreased mobility, Decreased skin integrity, Pain  Assessment: Pt  cont to be limited by pain especially with walking and refuses stairs management training due to pain  Pt  stated that she might go to [facility] today and this is also what the nurse reported  Pt  also unsure of what is the plan for her surgery [facility]  Will cont with POC for now until pending d/c    Barriers to Discharge: Inaccessible home environment, Decreased caregiver support  Barriers to Discharge Comments: Lives alone   Recommendation: Short-term skilled PT, Home PT, Home with family support          See flowsheet documentation for full assessment

## 2018-08-24 NOTE — PHYSICAL THERAPY NOTE
Physical Therapy Treatment     Patient Name: Tiffanie Givens    IFYWK'S Date: 8/24/2018     Problem List  Patient Active Problem List   Diagnosis    Essential hypertension    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 native heart without angina pectoris    Type 2 diabetes mellitus with complication, with long-term current use of insulin (HCC)    Atrial flutter (HCC)    Hyperlipidemia    Gastroesophageal reflux disease without esophagitis    Moderate mitral stenosis    Arthritis of hand    Acute respiratory failure with hypoxia ([facility] )    Asthma    Asymptomatic carotid artery stenosis, bilateral    Atherosclerosis of native artery of both lower extremities with bilateral ulceration (HCC)    Hx of CABG    Chronic anticoagulation    Chronic combined systolic and diastolic congestive heart failure (HCC)    Degenerative joint disease involving multiple joints    Diabetic retinopathy (HCC)    Postoperative hypothyroidism    Migraine headache    Nephrolithiasis    Osteoarthritis of both knees    Paroxysmal atrial fibrillation (HCC)    Ulcer of toe of left foot (HCC)    Ulcer of toe of right foot (HCC)    Prolonged Q-T interval on ECG    Hypokalemia    Hypocalcemia    Left bundle branch block (LBBB)    1st degree AV block    S/P TAVR (transcatheter aortic valve replacement)    Expressive aphasia    Multiple lacunar infarcts (HCC)    Vomiting    History of CVA (cerebrovascular accident)    Seizure ([facility] )    Supratherapeutic INR    NSVT (nonsustained ventricular tachycardia) (HCC)    Pulmonary hypertension (HCC)    Anemia    Severe sepsis (HCC)    Lactic acidosis    Hypomagnesemia    Bradycardia    Chest pain    Foot pain    PAD (peripheral artery disease) (HCC)    Cellulitis    CKD (chronic kidney disease) stage 3, GFR 30-59 ml/min        Past Medical History  Past Medical History:   Diagnosis Date    Altered mental status     Anemia     Anticoagulated     Xarelto for Afib/ flutter    Asthma     Atrial flutter (HCC)     maintained on anticoag w/ Xarelto    Bradycardia     CAD (coronary artery disease)     Candidiasis     Carotid stenosis, bilateral     Cataract     Chest pain     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) (HCC)     Chronic fatigue syndrome     Chronic low back pain     Chronic ulcer of toes (HCC)     left and right    Concussion w loss of consciousness of unsp duration, init     CVA (cerebral vascular accident) ([facility] ) 4/17/2018    Diabetes mellitus ([facility] [facility] )     type 2, insulin dependent    DJD (degenerative joint disease)     Expressive aphasia     Foot pain     GERD (gastroesophageal reflux disease)     Herpes zoster     HLD (hyperlipidemia)     HTN (hypertension)     Hypothyroidism     Irritable bowel syndrome     Kidney stone     Lumbar radiculopathy     Lyme disease     Dx in hospital 8/2011    MI (myocardial infarction) ([facility] )     Migraines     Muscle spasm     Non-neoplastic nevus     Nontoxic multinodular goiter     NSVT (nonsustained ventricular tachycardia) (HCC)     Osteoarthritis     Other chronic pain     PAD (peripheral artery disease) (HCC)     Palpitations     Paroxysmal atrial fibrillation (HCC)     Pseudogout     Pulmonary hypertension (HCC)     S/P TAVR (transcatheter aortic valve replacement)     Seizures (HCC)     Severe sepsis ([facility] )     Spinal stenosis     Stroke [facility] 05/2018    Transient cerebral ischemia     Trigger ring finger     Viral gastroenteritis         Past Surgical History  Past Surgical History:   Procedure Laterality Date    APPENDECTOMY      ARTERIOGRAM  12/19/2017    CARDIAC CATHETERIZATION      CHOLECYSTECTOMY      COLONOSCOPY      CORONARY ARTERY BYPASS GRAFT  2004    IR ABDOMINAL ANGIOGRAPHY / INTERVENTION  8/10/2018    IR ABDOMINAL ANGIOGRAPHY / INTERVENTION  8/21/2018    LUMBAR LAMINECTOMY      UT ECHO TRANSESOPHAG R-T 2D W/PRB IMG ACQUISJ I&R N/A 4/3/2018    Procedure: TRANSESOPHAGEAL ECHOCARDIOGRAM (ROBB); Surgeon: Ady Sotelo DO;  [facility];  Service: Cardiac Surgery    UT REPLACE AORTIC VALVE OPENFEMORAL ARTERY APPROACH N/A 4/3/2018    Procedure: REPLACEMENT AORTIC VALVE TRANSCATHETER (TAVR) TRANSFEMORAL w/ 26MM IVY YEVGENIY S3 VALVE;  Surgeon: Ady Sotelo DO;  [facility];  Service: Cardiac Surgery    THYROIDECTOMY      TOTAL ABDOMINAL HYSTERECTOMY W/ BILATERAL SALPINGOOPHORECTOMY             08/24/18 0937   Pain Assessment   Pain Score 8   Pain Location Foot   Pain Orientation Bilateral  (L>R)   Hospital Pain Intervention(s) Medication (See MAR); Elevated  (at end of [place] to 10/10)   Restrictions/Precautions   RLE Weight Bearing Per Order WBAT   LLE Weight Bearing Per Order WBAT   Braces or Orthoses (B surgical shoe)   Other Precautions Fall Risk;Pain   Cognition   Orientation Level Oriented X4   Subjective   Subjective Pt  reported that she is going to [facility] today    Transfers   Sit to Stand 5  Supervision  (CGA from lower w/c  )   Additional items Assist x 1; Increased time required   Stand to Sit 5  Supervision   Additional items Assist x 1; Increased time required   Stand pivot 5  Supervision   Additional items Assist x 1; Increased time required   Ambulation/Elevation   Gait pattern Improper Weight shift; Forward Flexion;Decreased foot clearance   Gait Assistance 5  Supervision  (CS)   Additional items Assist x 1   Assistive Device Rolling walker   Distance 20  (X1, 80 X 1 )   Stair Management Assistance Not tested   Additional items (pt  refused )   Activity Tolerance   Activity Tolerance Patient limited by pain   Nurse Made Aware Nurse Elbert Pelayo    Assessment   Prognosis Good  (if pain is managed )   Problem List Decreased endurance; Impaired balance;Decreased mobility; Decreased skin integrity;Pain   Assessment Pt  cont to be limited by pain especially with walking and refuses stairs management training due to pain  Pt  stated that she might go to [facility] today and this is also what the nurse reported  Pt  also unsure of what is the plan for her surgery [facility]  Will cont with POC for now until pending d/c     Barriers to Discharge Inaccessible home environment;Decreased caregiver support   Plan   Treatment/Interventions Functional transfer training;LE strengthening/ROM; Elevations; Therapeutic exercise; Endurance training;Patient/family training;Equipment eval/education; Bed mobility;Gait training   Progress Slow progress, decreased activity tolerance  (pt  cont to refuse stairs due to pain )   Recommendation   Recommendation Short-term skilled PT; Home PT; Home with family support   Equipment Recommended Pamela Jacobs

## 2018-08-24 NOTE — PROGRESS NOTES
For discharge today to the skilled nursing facility  Patient was seen by the residents patient was examined labs were reviewed discussed with the vascular surgery last evening patient will be discharged to skilled nursing facility for wound care and rehabilitation and will be brought back to the hospital for further evaluation and possible bypass grafting for peripheral vascular disease   Hypertension is controlled diabetes is controlled no signs of CHF chronically weak renal insufficiency is stable

## 2018-08-24 NOTE — SOCIAL WORK
CM informed by resident Dr Helena Parsons pt is medically ready to dc today  CM informed [facility] who can pick pt up at 1 pm  CM notified bedside RN and medical team of d/c time  CM spoke with pt who agrees to dcp and denies additional questions  Pt aware of d/c time  Pt requested CM review IMM with her son, Joel Leija  CM spoke with Joel Leija and informed him of pt being medically cleared for d/c today and p/u time for 1 pm  CM reviewed IMM  Joel Leija agrees with dcp and denies additional questions  Joel Leija requested update from physician  CM notified Dr Helena Parsons who will call son  Facility transfer form completed

## 2018-08-24 NOTE — NURSING NOTE
IV d/c'd  Report given to [facility]  AVS and prescriptions faxed to facility  [facility] transporter given all paperwork and pt belongings

## 2018-08-24 NOTE — PLAN OF CARE
Discharge to post-acute care or home with appropriate resources Progressing      Nutrient/Hydration intake appropriate for improving, restoring or maintaining nutritional needs Progressing      Patient will remain free of falls Progressing      Skin integrity is maintained or improved Progressing

## 2018-08-27 ENCOUNTER — TRANSITIONAL CARE MANAGEMENT (OUTPATIENT)
Dept: INTERNAL MEDICINE CLINIC | Facility: CLINIC | Age: 78
End: 2018-08-27

## 2018-08-27 ENCOUNTER — TELEPHONE (OUTPATIENT)
Dept: INTERNAL MEDICINE CLINIC | Facility: CLINIC | Age: 78
End: 2018-08-27

## 2018-08-27 NOTE — TELEPHONE ENCOUNTER
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

## 2018-08-29 ENCOUNTER — TELEPHONE (OUTPATIENT)
Dept: INTERNAL MEDICINE CLINIC | Facility: CLINIC | Age: 78
End: 2018-08-29

## 2018-08-29 NOTE — TELEPHONE ENCOUNTER
Son called back asking for a call back asap he got a call from [facility] heart and vascular, they cannot get a appt for her until 9/29  She is in rehab but they wont let her come home bc of pain  They consider amputation of foot, but her veins were fixed in her foot for right side  Then she was told she would need  her  Left leg below knee amputated  But she then got an opinion while in hospital that she may be able to get a bypass vein surgery in her left foot to try and help with blood flow  In the hospital the dr's told the procedure could take place 10-14 days after her hospitalization  She has already been out in rehab for 6 days       Please call son   # [phone]

## 2018-08-29 NOTE — TELEPHONE ENCOUNTER
Karissa Cantu called to discuss a plan of care for his mother, Martha Lima with Dr Ray Vázquez  He was under the impression she should be scheduled for bypass surgery and he was looking for a date of when it is to be done  He would like to speak with Sr Ray Vázquez  Can you please advise? Thank you

## 2018-08-31 NOTE — TELEPHONE ENCOUNTER
I called Dr Veronica Aggarwal office and spoke with [facility]  She will be contacting Yanni's son ([phone]) with appt arrangements for Tuesday 9/4/18

## 2018-08-31 NOTE — TELEPHONE ENCOUNTER
Discussed with the patient's son and also with the vascular surgery to schedule her for bypass grafting

## 2018-09-01 DIAGNOSIS — I48.0 PAROXYSMAL ATRIAL FIBRILLATION (HCC): ICD-10-CM

## 2018-09-04 ENCOUNTER — OFFICE VISIT (OUTPATIENT)
Dept: VASCULAR SURGERY | Facility: CLINIC | Age: 78
End: 2018-09-04
Payer: MEDICARE

## 2018-09-04 VITALS
WEIGHT: 152 LBS | SYSTOLIC BLOOD PRESSURE: 138 MMHG | HEIGHT: 64 IN | DIASTOLIC BLOOD PRESSURE: 82 MMHG | BODY MASS INDEX: 25.95 KG/M2 | TEMPERATURE: 97.7 F

## 2018-09-04 DIAGNOSIS — I70.245 ATHEROSCLEROSIS OF NATIVE ARTERY OF BOTH LOWER EXTREMITIES WITH BILATERAL ULCERATION OF OTHER PART OF FEET (HCC): ICD-10-CM

## 2018-09-04 DIAGNOSIS — I70.235 ATHEROSCLEROSIS OF NATIVE ARTERY OF BOTH LOWER EXTREMITIES WITH BILATERAL ULCERATION OF OTHER PART OF FEET (HCC): ICD-10-CM

## 2018-09-04 DIAGNOSIS — I70.269 ATHEROSCLEROSIS OF ARTERY OF EXTREMITY WITH GANGRENE (HCC): Primary | ICD-10-CM

## 2018-09-04 PROCEDURE — 99213 OFFICE O/P EST LOW 20 MIN: CPT | Performed by: SURGERY

## 2018-09-04 NOTE — PATIENT INSTRUCTIONS
Dry gangrene left foot great toe and 2nd toe with compromise distal circulation    Planning exploration of the plantar system left foot with possible popliteal to plantar bypass with greater saphenous vein possibly contralateral   Cardiac risk assessment was obtained in the hospital, surgery  to be scheduled as soon as possible

## 2018-09-04 NOTE — PROGRESS NOTES
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 with compromise distal circulation  Planning exploration of the plantar system left foot with possible popliteal to plantar bypass with greater saphenous vein possibly contralateral   Cardiac risk assessment was obtained in the hospital, surgery  to be scheduled as soon as possible         Diagnoses and all orders for this visit:    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 [facility])  -     Case request operating room: Exploration left plantar artery with possible popliteal to plantar bypass greater saphenous vein possible contralateral vein; Standing  -     Basic metabolic panel; Future  -     CBC and differential; Future  -     Case request operating room: Exploration left plantar artery with possible popliteal to plantar bypass greater saphenous vein possible contralateral vein    Atherosclerosis of native artery of both lower extremities with bilateral ulceration of other part of feet (HCC)    Other orders  -     Diet NPO; Sips with meds; Standing  -     Void on call to OR; Standing  -     Insert peripheral IV; Standing  -     Place sequential compression device; Standing  -     ceFAZolin (ANCEF) IVPB (premix) 1,000 mg; Infuse 50 mL (1,000 mg total) into a venous catheter once         Subjective:      Patient ID: Nash Guzman is a [de-identified] y o  female  HPI bilateral gangrene more severe on the left than the right recent endovascular intervention to the right leg with improved circulation    Left leg requires bypass surgery being planned in the next week or 2    The following portions of the patient's history were reviewed and updated as appropriate: allergies, current medications, past family history, past medical history, past social history, past surgical history and problem list     Review of Systems  bilateral gangrene, mild dementia all other systems negative    Objective:      /82 (BP Location: Right arm, Patient Position: Sitting, Cuff Size: Adult)   Temp 97 7 °F (36 5 °C) (Tympanic)   Ht 5' 4" (1 626 m)   Wt 68 9 kg (152 lb) Comment: per ot  LMP  (LMP Unknown)   BMI 26 09 kg/m²          Physical Exam       Physical Exam      Oriented x3 no evidence of clinical depression  Neck is supple carotid pulses equal bilaterally no bruits heard    Chest lungs clear, heart regular rhythm  Abdomen soft nontender no evidence of pulsatile masses  Pulses are palpable bilateral femoral popliteal, no pulses palpable at the foot  Dry gangrene posterior aspect of the left great toe and 2nd toe no evidence of cellulitis or lymphangitis at this time      Neurological exam intact cranial nerves 2-12 grossly intact no gross motor sensory deficits detected    Vitals:    09/04/18 1600   BP: 138/82   BP Location: Right arm   Patient Position: Sitting   Cuff Size: Adult   Temp: 97 7 °F (36 5 °C)   TempSrc: Tympanic   Weight: 68 9 kg (152 lb)   Height: 5' 4" (1 626 m)       Patient Active Problem List   Diagnosis    Essential hypertension    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 native heart without angina pectoris    Type 2 diabetes mellitus with complication, with long-term current use of insulin (HCC)    Atrial flutter (HCC)    Hyperlipidemia    Gastroesophageal reflux disease without esophagitis    Moderate mitral stenosis    Arthritis of hand    Acute respiratory failure with hypoxia (HCC)    Asthma    Asymptomatic carotid artery stenosis, bilateral    Atherosclerosis of native artery of both lower extremities with bilateral ulceration (HCC)    Hx of CABG    Chronic anticoagulation    Chronic combined systolic and diastolic congestive heart failure (HCC)    Degenerative joint disease involving multiple joints    Diabetic retinopathy (HCC)    Postoperative hypothyroidism    Migraine headache    Nephrolithiasis  Osteoarthritis of both knees    Paroxysmal atrial fibrillation (HCC)    Ulcer of toe of left foot (HCC)    Ulcer of toe of right foot (HCC)    Prolonged Q-T interval on ECG    Hypokalemia    Hypocalcemia    Left bundle branch block (LBBB)    1st degree AV block    S/P TAVR (transcatheter aortic valve replacement)    Expressive aphasia    Multiple lacunar infarcts (HCC)    Vomiting    History of CVA (cerebrovascular accident)    Seizure ([address] )    Supratherapeutic INR    NSVT (nonsustained ventricular tachycardia) (HCC)    Pulmonary hypertension (HCC)    Anemia    Severe sepsis (HCC)    Lactic acidosis    Hypomagnesemia    Bradycardia    Chest pain    Foot pain    PAD (peripheral artery disease) (HCC)    Cellulitis    CKD (chronic kidney disease) stage 3, GFR 30-59 ml/min       Past Surgical History:   Procedure Laterality Date    APPENDECTOMY      ARTERIOGRAM  12/19/2017    CARDIAC CATHETERIZATION      CHOLECYSTECTOMY      COLONOSCOPY      CORONARY ARTERY BYPASS GRAFT  2004    IR ABDOMINAL ANGIOGRAPHY / INTERVENTION  8/10/2018    IR ABDOMINAL ANGIOGRAPHY / INTERVENTION  8/21/2018    LUMBAR LAMINECTOMY      SD ECHO TRANSESOPHAG R-T 2D W/PRB IMG ACQUISJ I&R N/A 4/3/2018    Procedure: TRANSESOPHAGEAL ECHOCARDIOGRAM (ROBB);   Surgeon: Kandace Prader, DO;  [facility];  Service: Cardiac Surgery    SD REPLACE AORTIC VALVE OPENFEMORAL ARTERY APPROACH N/A 4/3/2018    Procedure: REPLACEMENT AORTIC VALVE TRANSCATHETER (TAVR) TRANSFEMORAL w/ 26MM IVY YEVGENIY S3 VALVE;  Surgeon: Kandace Prader, DO;  Location[facility];  Service: Cardiac Surgery    THYROIDECTOMY      TOTAL ABDOMINAL HYSTERECTOMY W/ BILATERAL SALPINGOOPHORECTOMY         Family History   Problem Relation Age of Onset    Cancer Mother     Stroke Mother     Cancer Father     Heart disease Father     Breast cancer Sister     Diabetes Daughter         Passed away January 2017        Social History Social History    Marital status:      Spouse name: N/A    Number of children: N/A    Years of education: N/A     Occupational History    Not on file       Social History Main Topics    Smoking status: Never Smoker    Smokeless tobacco: Never Used    Alcohol use No    Drug use: No    Sexual activity: Not on file     Other Topics Concern    Not on file     Social History Narrative    No narrative on file       Allergies   Allergen Reactions    Other Chest Pain     IVP-listed as "chest pain" in previous chart, patient stated this occurred "a long time ago" but does not remember exactly occurred     Contrast [Iodinated Diagnostic Agents] Other (See Comments)     Flash pulm edema    Doxycycline Chest Pain    Ketorolac Other (See Comments)     Chest pain     Levaquin [Levofloxacin] Chest Pain    Ondansetron Chest Pain     Prolonged QT    Toradol [Ketorolac Tromethamine] Chest Pain         Current Outpatient Prescriptions:     acetaminophen (TYLENOL) 325 mg tablet, Take 3 tablets (975 mg total) by mouth every 8 (eight) hours, Disp: 180 tablet, Rfl: 0    acetaminophen (TYLENOL) 650 mg CR tablet, Take 1 tablet (650 mg total) by mouth every 8 (eight) hours as needed for mild pain Do not take in conjunction with Percocet , Disp: 90 tablet, Rfl: 0    amiodarone 200 mg tablet, Take 1 tablet (200 mg total) by mouth 2 (two) times a day, Disp: 60 tablet, Rfl: 3    amLODIPine (NORVASC) 10 mg tablet, Take 1 tablet (10 mg total) by mouth daily, Disp: 30 tablet, Rfl: 0    aspirin 81 MG tablet, Take 81 mg by mouth daily, Disp: , Rfl:     atorvastatin (LIPITOR) 80 mg tablet, Take 1 tablet (80 mg total) by mouth daily, Disp: 30 tablet, Rfl: 2    calcium carbonate (TUMS) 500 mg chewable tablet, Chew 2 tablets 3 (three) times a day  , Disp: , Rfl:     Cholecalciferol (VITAMIN D3) 1000 units CHEW, Chew 1 tablet (1,000 Units total) 2 (two) times a day, Disp: 60 tablet, Rfl: 5    docusate sodium (COLACE) 100 mg capsule, Take 1 capsule (100 mg total) by mouth 2 (two) times a day, Disp: 60 capsule, Rfl: 0    fentaNYL (DURAGESIC) 25 mcg/hr, Place 1 patch on the skin every third day for 10 doses Earliest Fill Date: 8/26/18 Max Daily Amount: 1 patch, Disp: 10 patch, Rfl: 0    ferrous sulfate 325 (65 Fe) mg tablet, Take 1 tablet (325 mg total) by mouth every other day, Disp: 30 tablet, Rfl: 0    fluticasone (FLONASE) 50 mcg/act nasal spray, 1 spray into each nostril daily, Disp: , Rfl:     furosemide (LASIX) 40 mg tablet, Take 1 tablet (40 mg total) by mouth daily, Disp: 90 tablet, Rfl: 3    gabapentin (NEURONTIN) 100 mg capsule, Take 1 capsule (100 mg total) by mouth 3 (three) times a day, Disp: 90 capsule, Rfl: 0    glucose blood test strip, 1 each by Other route 4 (four) times a day (before meals and at bedtime) Use as instructed, Disp: , Rfl:     Insulin Syringe-Needle U-100 (BD INSULIN SYRINGE ULTRAFINE) 31G X 5/16" 1 ML MISC, Inject under the skin 2 (two) times a day, Disp: 200 each, Rfl: 3    LANTUS 100 UNIT/ML subcutaneous injection, Inject 20 Units under the skin 2 (two) times a day, Disp: 10 mL, Rfl: 0    levETIRAcetam (KEPPRA) 750 mg tablet, Take 1 tablet (750 mg total) by mouth every 12 (twelve) hours, Disp: 60 tablet, Rfl: 11    levothyroxine 100 mcg tablet, Take 1 tablet (100 mcg total) by mouth daily, Disp: 90 tablet, Rfl: 1    lisinopril (ZESTRIL) 10 mg tablet, Take 1 tablet (10 mg total) by mouth daily, Disp: 30 tablet, Rfl: 0    loratadine (CLARITIN) 10 mg tablet, Take 1 tablet (10 mg total) by mouth daily, Disp: 90 tablet, Rfl: 1    magnesium oxide (MAG-OX) 400 mg, Take 1 tablet (400 mg total) by mouth 2 (two) times a day, Disp: 180 tablet, Rfl: 1    metFORMIN (GLUCOPHAGE) 1000 MG tablet, 1,000 mg 2 (two) times a day, Disp: , Rfl:     metoclopramide (REGLAN) 10 mg tablet, Take 0 5 tablets (5 mg total) by mouth every 6 (six) hours as needed (Nausea and vomiting), Disp: 30 tablet, Rfl: 1   metoprolol succinate (TOPROL-XL) 25 mg 24 hr tablet, Take 0 5 tablets (12 5 mg total) by mouth daily at bedtime, Disp: 30 tablet, Rfl: 0    Mometasone Furoate (ASMANEX HFA) 100 MCG/ACT AERO, Inhale 2 puffs once daily, Disp: 1 Inhaler, Rfl: 5    nitroglycerin (NITROSTAT) 0 4 mg SL tablet, Place 0 4 mg under the tongue every 3 (three) minutes as needed, Disp: , Rfl:     pantoprazole (PROTONIX) 40 mg tablet, Take 40 mg by mouth daily, Disp: , Rfl:     phenazopyridine (PYRIDIUM) 200 mg tablet, Take 1 tablet (200 mg total) by mouth 3 (three) times a day with meals, Disp: 10 tablet, Rfl: 0    polyethylene glycol (MIRALAX) 17 g packet, Take 17 g by mouth daily as needed  , Disp: , Rfl:     potassium chloride (K-DUR,KLOR-CON) 10 mEq tablet, Take 1 tablet (10 mEq total) by mouth 2 (two) times a day, Disp: 60 tablet, Rfl: 2    rivaroxaban (XARELTO) 20 mg tablet, Take 1 tablet (20 mg total) by mouth daily, Disp: 90 tablet, Rfl: 0    albuterol (VENTOLIN HFA) 90 mcg/act inhaler, Inhale 108 mcg 2 (two) times a day as needed 2 puffs bid PRN, Disp: , Rfl:

## 2018-09-05 ENCOUNTER — TELEPHONE (OUTPATIENT)
Dept: VASCULAR SURGERY | Facility: CLINIC | Age: 78
End: 2018-09-05

## 2018-09-05 ENCOUNTER — PREP FOR PROCEDURE (OUTPATIENT)
Dept: VASCULAR SURGERY | Facility: CLINIC | Age: 78
End: 2018-09-05

## 2018-09-05 DIAGNOSIS — I70.262 ATHEROSCLEROSIS OF NATIVE ARTERY OF LEFT LOWER EXTREMITY WITH GANGRENE (HCC): Primary | ICD-10-CM

## 2018-09-05 PROBLEM — I70.269 ATHEROSCLEROSIS OF ARTERY OF EXTREMITY WITH GANGRENE (HCC): Status: ACTIVE | Noted: 2018-09-05

## 2018-09-05 NOTE — PROGRESS NOTES
Patient is to hold aspirin 10 days and Xarelto 4 days  LMOM for son Iliana Chaparro and trying to reach [facility] where patient lives [phone]

## 2018-09-05 NOTE — TELEPHONE ENCOUNTER
[facility] [phone] and spoke with Marycarmen Walker  Advised her procedure is scheduled at [facility]/OR on 9/12/18 for Dr Claudia Gonzalez to do  She is requesting I fax her the scripts and orders to [phone] attn Marycarmen Walker or Angela Espino  I have forwarded a copy of surgery scheduling sheet to precrodolfo dept and I also LMOM for her son Karissa Cantu about this procedure and requested a return phone call

## 2018-09-07 ENCOUNTER — APPOINTMENT (INPATIENT)
Dept: RADIOLOGY | Facility: HOSPITAL | Age: 78
DRG: 239 | End: 2018-09-07
Payer: MEDICARE

## 2018-09-07 ENCOUNTER — APPOINTMENT (EMERGENCY)
Dept: RADIOLOGY | Facility: HOSPITAL | Age: 78
DRG: 239 | End: 2018-09-07
Payer: MEDICARE

## 2018-09-07 ENCOUNTER — HOSPITAL ENCOUNTER (INPATIENT)
Facility: HOSPITAL | Age: 78
LOS: 17 days | DRG: 239 | End: 2018-09-24
Attending: EMERGENCY MEDICINE | Admitting: INTERNAL MEDICINE
Payer: MEDICARE

## 2018-09-07 DIAGNOSIS — R56.9 SEIZURE (HCC): ICD-10-CM

## 2018-09-07 DIAGNOSIS — N99.89 POSTOPERATIVE URINARY RETENTION: ICD-10-CM

## 2018-09-07 DIAGNOSIS — Z89.511 S/P BKA (BELOW KNEE AMPUTATION) UNILATERAL, RIGHT (HCC): ICD-10-CM

## 2018-09-07 DIAGNOSIS — I73.9 PAD (PERIPHERAL ARTERY DISEASE) (HCC): ICD-10-CM

## 2018-09-07 DIAGNOSIS — Z95.2 S/P TAVR (TRANSCATHETER AORTIC VALVE REPLACEMENT): ICD-10-CM

## 2018-09-07 DIAGNOSIS — I70.269 ATHEROSCLEROSIS OF ARTERY OF EXTREMITY WITH GANGRENE (HCC): ICD-10-CM

## 2018-09-07 DIAGNOSIS — M79.673 FOOT PAIN: ICD-10-CM

## 2018-09-07 DIAGNOSIS — N17.9 AKI (ACUTE KIDNEY INJURY) (HCC): Primary | ICD-10-CM

## 2018-09-07 DIAGNOSIS — I96 WET GANGRENE (HCC): ICD-10-CM

## 2018-09-07 DIAGNOSIS — R33.8 POSTOPERATIVE URINARY RETENTION: ICD-10-CM

## 2018-09-07 DIAGNOSIS — E55.9 VITAMIN D DEFICIENCY: ICD-10-CM

## 2018-09-07 PROBLEM — M67.912 ROTATOR CUFF DISORDER, LEFT: Status: ACTIVE | Noted: 2018-09-07

## 2018-09-07 PROBLEM — R79.1 SUPRATHERAPEUTIC INR: Status: RESOLVED | Noted: 2018-04-29 | Resolved: 2018-09-07

## 2018-09-07 PROBLEM — A41.9 SEVERE SEPSIS (HCC): Status: RESOLVED | Noted: 2018-05-01 | Resolved: 2018-09-07

## 2018-09-07 PROBLEM — R65.20 SEVERE SEPSIS (HCC): Status: RESOLVED | Noted: 2018-05-01 | Resolved: 2018-09-07

## 2018-09-07 PROBLEM — R79.1 ELEVATED INR: Status: ACTIVE | Noted: 2018-09-07

## 2018-09-07 PROBLEM — E03.9 HYPOTHYROIDISM: Chronic | Status: ACTIVE | Noted: 2018-09-07

## 2018-09-07 PROBLEM — R07.9 CHEST PAIN: Status: RESOLVED | Noted: 2018-06-19 | Resolved: 2018-09-07

## 2018-09-07 PROBLEM — J96.01 ACUTE RESPIRATORY FAILURE WITH HYPOXIA (HCC): Status: RESOLVED | Noted: 2018-03-16 | Resolved: 2018-09-07

## 2018-09-07 LAB
ANION GAP SERPL CALCULATED.3IONS-SCNC: 7 MMOL/L (ref 4–13)
APTT PPP: 50 SECONDS (ref 24–36)
APTT PPP: 57 SECONDS (ref 24–36)
BASOPHILS # BLD AUTO: 0.04 THOUSANDS/ΜL (ref 0–0.1)
BASOPHILS NFR BLD AUTO: 0 % (ref 0–1)
BUN SERPL-MCNC: 45 MG/DL (ref 5–25)
CALCIUM SERPL-MCNC: 9 MG/DL (ref 8.3–10.1)
CHLORIDE SERPL-SCNC: 103 MMOL/L (ref 100–108)
CO2 SERPL-SCNC: 25 MMOL/L (ref 21–32)
CREAT SERPL-MCNC: 1.41 MG/DL (ref 0.6–1.3)
CRP SERPL QL: 71.6 MG/L
EOSINOPHIL # BLD AUTO: 0.2 THOUSAND/ΜL (ref 0–0.61)
EOSINOPHIL NFR BLD AUTO: 2 % (ref 0–6)
ERYTHROCYTE [DISTWIDTH] IN BLOOD BY AUTOMATED COUNT: 17.8 % (ref 11.6–15.1)
ERYTHROCYTE [SEDIMENTATION RATE] IN BLOOD: 98 MM/HOUR (ref 0–20)
GFR SERPL CREATININE-BSD FRML MDRD: 35 ML/MIN/1.73SQ M
GLUCOSE SERPL-MCNC: 126 MG/DL (ref 65–140)
GLUCOSE SERPL-MCNC: 98 MG/DL (ref 65–140)
HCT VFR BLD AUTO: 29.3 % (ref 34.8–46.1)
HGB BLD-MCNC: 8.7 G/DL (ref 11.5–15.4)
IMM GRANULOCYTES # BLD AUTO: 0.09 THOUSAND/UL (ref 0–0.2)
IMM GRANULOCYTES NFR BLD AUTO: 1 % (ref 0–2)
INR PPP: 2.26 (ref 0.86–1.17)
INR PPP: 3.04 (ref 0.86–1.17)
LYMPHOCYTES # BLD AUTO: 1.03 THOUSANDS/ΜL (ref 0.6–4.47)
LYMPHOCYTES NFR BLD AUTO: 9 % (ref 14–44)
MCH RBC QN AUTO: 24.2 PG (ref 26.8–34.3)
MCHC RBC AUTO-ENTMCNC: 29.7 G/DL (ref 31.4–37.4)
MCV RBC AUTO: 81 FL (ref 82–98)
MONOCYTES # BLD AUTO: 0.96 THOUSAND/ΜL (ref 0.17–1.22)
MONOCYTES NFR BLD AUTO: 8 % (ref 4–12)
NEUTROPHILS # BLD AUTO: 9.22 THOUSANDS/ΜL (ref 1.85–7.62)
NEUTS SEG NFR BLD AUTO: 80 % (ref 43–75)
NRBC BLD AUTO-RTO: 0 /100 WBCS
PLATELET # BLD AUTO: 340 THOUSANDS/UL (ref 149–390)
PMV BLD AUTO: 9.9 FL (ref 8.9–12.7)
POTASSIUM SERPL-SCNC: 5.7 MMOL/L (ref 3.5–5.3)
PROTHROMBIN TIME: 25 SECONDS (ref 11.8–14.2)
PROTHROMBIN TIME: 31.5 SECONDS (ref 11.8–14.2)
RBC # BLD AUTO: 3.6 MILLION/UL (ref 3.81–5.12)
SODIUM SERPL-SCNC: 135 MMOL/L (ref 136–145)
WBC # BLD AUTO: 11.54 THOUSAND/UL (ref 4.31–10.16)

## 2018-09-07 PROCEDURE — 86140 C-REACTIVE PROTEIN: CPT | Performed by: STUDENT IN AN ORGANIZED HEALTH CARE EDUCATION/TRAINING PROGRAM

## 2018-09-07 PROCEDURE — 80048 BASIC METABOLIC PNL TOTAL CA: CPT | Performed by: EMERGENCY MEDICINE

## 2018-09-07 PROCEDURE — 73630 X-RAY EXAM OF FOOT: CPT

## 2018-09-07 PROCEDURE — 93005 ELECTROCARDIOGRAM TRACING: CPT

## 2018-09-07 PROCEDURE — 85610 PROTHROMBIN TIME: CPT | Performed by: EMERGENCY MEDICINE

## 2018-09-07 PROCEDURE — 96374 THER/PROPH/DIAG INJ IV PUSH: CPT

## 2018-09-07 PROCEDURE — 5A05221 EXTRACORPOREAL HYPERBARIC OXYGENATION, CONTINUOUS: ICD-10-PCS | Performed by: SURGERY

## 2018-09-07 PROCEDURE — 99223 1ST HOSP IP/OBS HIGH 75: CPT | Performed by: INTERNAL MEDICINE

## 2018-09-07 PROCEDURE — 85730 THROMBOPLASTIN TIME PARTIAL: CPT | Performed by: STUDENT IN AN ORGANIZED HEALTH CARE EDUCATION/TRAINING PROGRAM

## 2018-09-07 PROCEDURE — 85025 COMPLETE CBC W/AUTO DIFF WBC: CPT | Performed by: EMERGENCY MEDICINE

## 2018-09-07 PROCEDURE — 85652 RBC SED RATE AUTOMATED: CPT | Performed by: STUDENT IN AN ORGANIZED HEALTH CARE EDUCATION/TRAINING PROGRAM

## 2018-09-07 PROCEDURE — 87075 CULTR BACTERIA EXCEPT BLOOD: CPT | Performed by: STUDENT IN AN ORGANIZED HEALTH CARE EDUCATION/TRAINING PROGRAM

## 2018-09-07 PROCEDURE — 82948 REAGENT STRIP/BLOOD GLUCOSE: CPT

## 2018-09-07 PROCEDURE — 0JBR0ZZ EXCISION OF LEFT FOOT SUBCUTANEOUS TISSUE AND FASCIA, OPEN APPROACH: ICD-10-PCS | Performed by: PODIATRIST

## 2018-09-07 PROCEDURE — 87147 CULTURE TYPE IMMUNOLOGIC: CPT | Performed by: STUDENT IN AN ORGANIZED HEALTH CARE EDUCATION/TRAINING PROGRAM

## 2018-09-07 PROCEDURE — 87186 SC STD MICRODIL/AGAR DIL: CPT | Performed by: STUDENT IN AN ORGANIZED HEALTH CARE EDUCATION/TRAINING PROGRAM

## 2018-09-07 PROCEDURE — 87070 CULTURE OTHR SPECIMN AEROBIC: CPT | Performed by: STUDENT IN AN ORGANIZED HEALTH CARE EDUCATION/TRAINING PROGRAM

## 2018-09-07 PROCEDURE — 71046 X-RAY EXAM CHEST 2 VIEWS: CPT

## 2018-09-07 PROCEDURE — 87077 CULTURE AEROBIC IDENTIFY: CPT | Performed by: STUDENT IN AN ORGANIZED HEALTH CARE EDUCATION/TRAINING PROGRAM

## 2018-09-07 PROCEDURE — 85730 THROMBOPLASTIN TIME PARTIAL: CPT | Performed by: EMERGENCY MEDICINE

## 2018-09-07 PROCEDURE — 99285 EMERGENCY DEPT VISIT HI MDM: CPT

## 2018-09-07 PROCEDURE — 96361 HYDRATE IV INFUSION ADD-ON: CPT

## 2018-09-07 PROCEDURE — 85610 PROTHROMBIN TIME: CPT | Performed by: STUDENT IN AN ORGANIZED HEALTH CARE EDUCATION/TRAINING PROGRAM

## 2018-09-07 PROCEDURE — 87205 SMEAR GRAM STAIN: CPT | Performed by: STUDENT IN AN ORGANIZED HEALTH CARE EDUCATION/TRAINING PROGRAM

## 2018-09-07 PROCEDURE — 73030 X-RAY EXAM OF SHOULDER: CPT

## 2018-09-07 PROCEDURE — 36415 COLL VENOUS BLD VENIPUNCTURE: CPT | Performed by: EMERGENCY MEDICINE

## 2018-09-07 RX ORDER — LABETALOL HYDROCHLORIDE 5 MG/ML
5 INJECTION, SOLUTION INTRAVENOUS EVERY 6 HOURS PRN
Status: DISCONTINUED | OUTPATIENT
Start: 2018-09-07 | End: 2018-09-24 | Stop reason: HOSPADM

## 2018-09-07 RX ORDER — LEVOTHYROXINE SODIUM 0.1 MG/1
100 TABLET ORAL
Status: DISCONTINUED | OUTPATIENT
Start: 2018-09-08 | End: 2018-09-24 | Stop reason: HOSPADM

## 2018-09-07 RX ORDER — ALBUTEROL SULFATE 90 UG/1
1 AEROSOL, METERED RESPIRATORY (INHALATION) EVERY 4 HOURS PRN
Status: DISCONTINUED | OUTPATIENT
Start: 2018-09-07 | End: 2018-09-24 | Stop reason: HOSPADM

## 2018-09-07 RX ORDER — SACCHAROMYCES BOULARDII 250 MG
250 CAPSULE ORAL 2 TIMES DAILY
COMMUNITY
Start: 2018-09-05 | End: 2018-09-25

## 2018-09-07 RX ORDER — FLUTICASONE PROPIONATE 110 UG/1
1 AEROSOL, METERED RESPIRATORY (INHALATION) EVERY 12 HOURS SCHEDULED
Status: DISCONTINUED | OUTPATIENT
Start: 2018-09-07 | End: 2018-09-24 | Stop reason: HOSPADM

## 2018-09-07 RX ORDER — OXYCODONE HYDROCHLORIDE 5 MG/1
5 TABLET ORAL EVERY 4 HOURS PRN
COMMUNITY
End: 2018-09-24 | Stop reason: HOSPADM

## 2018-09-07 RX ORDER — GABAPENTIN 100 MG/1
100 CAPSULE ORAL 3 TIMES DAILY
Status: DISCONTINUED | OUTPATIENT
Start: 2018-09-07 | End: 2018-09-11

## 2018-09-07 RX ORDER — MORPHINE SULFATE 4 MG/ML
4 INJECTION, SOLUTION INTRAMUSCULAR; INTRAVENOUS ONCE
Status: DISCONTINUED | OUTPATIENT
Start: 2018-09-07 | End: 2018-09-07

## 2018-09-07 RX ORDER — ATORVASTATIN CALCIUM 80 MG/1
80 TABLET, FILM COATED ORAL DAILY
Status: DISCONTINUED | OUTPATIENT
Start: 2018-09-08 | End: 2018-09-24 | Stop reason: HOSPADM

## 2018-09-07 RX ORDER — METRONIDAZOLE 500 MG/1
500 TABLET ORAL EVERY 8 HOURS SCHEDULED
Status: DISCONTINUED | OUTPATIENT
Start: 2018-09-07 | End: 2018-09-20

## 2018-09-07 RX ORDER — LISINOPRIL 10 MG/1
10 TABLET ORAL DAILY
Status: DISCONTINUED | OUTPATIENT
Start: 2018-09-08 | End: 2018-09-07

## 2018-09-07 RX ORDER — HEPARIN SODIUM 10000 [USP'U]/100ML
3-20 INJECTION, SOLUTION INTRAVENOUS
Status: DISCONTINUED | OUTPATIENT
Start: 2018-09-07 | End: 2018-09-08

## 2018-09-07 RX ORDER — FENTANYL CITRATE 50 UG/ML
25 INJECTION, SOLUTION INTRAMUSCULAR; INTRAVENOUS ONCE
Status: COMPLETED | OUTPATIENT
Start: 2018-09-07 | End: 2018-09-07

## 2018-09-07 RX ORDER — INSULIN GLARGINE 100 [IU]/ML
18 INJECTION, SOLUTION SUBCUTANEOUS 2 TIMES DAILY
Status: DISCONTINUED | OUTPATIENT
Start: 2018-09-07 | End: 2018-09-14

## 2018-09-07 RX ORDER — DOCUSATE SODIUM 100 MG/1
100 CAPSULE, LIQUID FILLED ORAL 2 TIMES DAILY
Status: DISCONTINUED | OUTPATIENT
Start: 2018-09-07 | End: 2018-09-10

## 2018-09-07 RX ORDER — AMLODIPINE BESYLATE 10 MG/1
10 TABLET ORAL DAILY
Status: DISCONTINUED | OUTPATIENT
Start: 2018-09-08 | End: 2018-09-24 | Stop reason: HOSPADM

## 2018-09-07 RX ORDER — SODIUM CHLORIDE 9 MG/ML
75 INJECTION, SOLUTION INTRAVENOUS CONTINUOUS
Status: DISCONTINUED | OUTPATIENT
Start: 2018-09-07 | End: 2018-09-08

## 2018-09-07 RX ORDER — MELATONIN
1000 DAILY
Status: DISCONTINUED | OUTPATIENT
Start: 2018-09-08 | End: 2018-09-24 | Stop reason: HOSPADM

## 2018-09-07 RX ORDER — LEVETIRACETAM 750 MG/1
750 TABLET ORAL DAILY
Status: DISCONTINUED | OUTPATIENT
Start: 2018-09-08 | End: 2018-09-24 | Stop reason: HOSPADM

## 2018-09-07 RX ORDER — AMIODARONE HYDROCHLORIDE 200 MG/1
200 TABLET ORAL 2 TIMES DAILY
Status: DISCONTINUED | OUTPATIENT
Start: 2018-09-07 | End: 2018-09-24 | Stop reason: HOSPADM

## 2018-09-07 RX ORDER — FUROSEMIDE 40 MG/1
40 TABLET ORAL DAILY
Status: DISCONTINUED | OUTPATIENT
Start: 2018-09-08 | End: 2018-09-07

## 2018-09-07 RX ORDER — OMEPRAZOLE 20 MG/1
20 CAPSULE, DELAYED RELEASE ORAL DAILY
Status: ON HOLD | COMMUNITY
End: 2018-12-05 | Stop reason: CLARIF

## 2018-09-07 RX ORDER — ACETAMINOPHEN 325 MG/1
650 TABLET ORAL EVERY 6 HOURS PRN
Status: DISCONTINUED | OUTPATIENT
Start: 2018-09-07 | End: 2018-09-11

## 2018-09-07 RX ADMIN — SODIUM CHLORIDE 500 ML: 0.9 INJECTION, SOLUTION INTRAVENOUS at 16:48

## 2018-09-07 RX ADMIN — DOCUSATE SODIUM 100 MG: 100 CAPSULE, LIQUID FILLED ORAL at 21:32

## 2018-09-07 RX ADMIN — SODIUM CHLORIDE 500 ML: 0.9 INJECTION, SOLUTION INTRAVENOUS at 16:07

## 2018-09-07 RX ADMIN — FLUTICASONE PROPIONATE 1 PUFF: 110 AEROSOL, METERED RESPIRATORY (INHALATION) at 21:42

## 2018-09-07 RX ADMIN — GABAPENTIN 100 MG: 100 CAPSULE ORAL at 21:32

## 2018-09-07 RX ADMIN — INSULIN GLARGINE 18 UNITS: 100 INJECTION, SOLUTION SUBCUTANEOUS at 21:30

## 2018-09-07 RX ADMIN — FENTANYL CITRATE 25 MCG: 50 INJECTION, SOLUTION INTRAMUSCULAR; INTRAVENOUS at 16:45

## 2018-09-07 RX ADMIN — SODIUM CHLORIDE 75 ML/HR: 0.9 INJECTION, SOLUTION INTRAVENOUS at 21:30

## 2018-09-07 RX ADMIN — MAGNESIUM OXIDE TAB 400 MG (241.3 MG ELEMENTAL MG) 400 MG: 400 (241.3 MG) TAB at 21:32

## 2018-09-07 RX ADMIN — AMIODARONE HYDROCHLORIDE 200 MG: 200 TABLET ORAL at 21:32

## 2018-09-07 NOTE — TELEPHONE ENCOUNTER
[address] [facility] this morning and spoke w/Shavonne and reminded her patient's Xarelto last day is today

## 2018-09-07 NOTE — CONSULTS
Consultation - General Surgery   Dorothea Lynch [de-identified] y o  female MRN: [id number]  Unit/Bed#: [facility] Encounter: [id number]    Assessment/Plan   Assessment:  Dorothea Lynch is a [de-identified] y o  female with history dry gangrene left great toe who presents with fever, leukocytosis, increased discoloration of left great toe, and fluid discharge from the base of her left great toe which is concerning for wet gangrene  In addition she has dopplerable pedal pulses and a therapeutic level of INR at 3 04  Plan:  - No urgent Vascular Intervention indicated  - Recommend evaluation by Medicine for admission and sepsis workup  - Recommend Stat Podiatry consult for wet gangrene (spoke with on call resident regarding patient)  - Patient and plan discussed with Dr Negrito Wood      History of Present Illness   HPI:  Dorothea Lynch is a [de-identified] y o  female with history dry gangrene left great toe who presents with fever, leukocytosis, increased discoloration of left great toe, and fluid discharge from the base of her left great toe  Patient was seen by the vascular surgery service a couple weeks ago at which time we are offering the patient a BKA  Patient decided not to pursue the BKA  For 2nd opinion the patient recently saw Dr Yolanda Nolasco on 09/04/2018 at which time he offered the patient a bypass procedure to improve flow distally on 09/12/2018 to improve healing potential of chronic ulcerations  Yesterday the patient claims to have started feeling ill  She noted that while she is walking she felt sudden rush of fluid coming from her distal left foot and stain her left shoes with a tan color  She also endorses fever and nausea  She denies emesis or abdominal pain  She denies chest pain, shortness of breath, or cough  She did recently finish antibiotics for a UTI  Vascular Surgery was consulted for evaluation  Consults    Review of Systems  10/14 systems reviewed and negative except those mentioned in the HPI        Historical Information Past Medical History:   Diagnosis Date    Altered mental status     Anemia     Anticoagulated     Xarelto for Afib/ flutter    Asthma     Atrial flutter (HCC)     maintained on anticoag w/ Xarelto    Bradycardia     CAD (coronary artery disease)     Candidiasis     Carotid stenosis, bilateral     Cataract     Chest pain     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) (HCC)     Chronic fatigue syndrome     Chronic low back pain     Chronic ulcer of toes (HCC)     left and right    Concussion w loss of consciousness of unsp duration, init     CVA [facility]cerebral vascular accident) [facility] ) 4/17/2018    Diabetes mellitus [facility] )     type 2, insulin dependent    DJD (degenerative joint disease)     Expressive aphasia     Foot pain     GERD (gastroesophageal reflux disease)     Herpes zoster     HLD (hyperlipidemia)     HTN (hypertension)     Hypothyroidism     Irritable bowel syndrome     Kidney stone     Lumbar radiculopathy     Lyme disease     Dx in hospital 8/2011    MI (myocardial infarction) ([facility] )     Migraines     Muscle spasm     Non-neoplastic nevus     Nontoxic multinodular goiter     NSVT (nonsustained ventricular tachycardia) (HCC)     Osteoarthritis     Other chronic pain     PAD (peripheral artery disease) (HCC)     Palpitations     Paroxysmal atrial fibrillation (HCC)     Pseudogout     Pulmonary hypertension (HCC)     S/P TAVR (transcatheter aortic valve replacement)     Seizures (HCC)     Severe sepsis (HCC)     Spinal stenosis     Stroke ([facility] ) 05/2018    Transient cerebral ischemia     Trigger ring finger     Viral gastroenteritis      Past Surgical History:   Procedure Laterality Date    APPENDECTOMY      ARTERIOGRAM  12/19/2017    CARDIAC CATHETERIZATION      CHOLECYSTECTOMY      COLONOSCOPY      CORONARY ARTERY BYPASS GRAFT  2004    IR ABDOMINAL ANGIOGRAPHY / INTERVENTION  8/10/2018    IR ABDOMINAL ANGIOGRAPHY / INTERVENTION  8/21/2018    LUMBAR LAMINECTOMY      GA ECHO TRANSESOPHAG R-T 2D W/PRB IMG ACQUISJ I&R N/A 4/3/2018    Procedure: TRANSESOPHAGEAL ECHOCARDIOGRAM (ROBB); Surgeon: Alma Guillen DO;  Location: [facility];  Service: Cardiac Surgery    GA REPLACE AORTIC VALVE OPENFEMORAL ARTERY APPROACH N/A 4/3/2018    Procedure: REPLACEMENT AORTIC VALVE TRANSCATHETER (TAVR) TRANSFEMORAL w/ 26MM IVY YEVGENIY S3 VALVE;  Surgeon: Alma Guillen DO;  Location: [facility];  Service: Cardiac Surgery    THYROIDECTOMY      TOTAL ABDOMINAL HYSTERECTOMY W/ BILATERAL SALPINGOOPHORECTOMY       Social History   History   Alcohol Use No     History   Drug Use No     History   Smoking Status    Never Smoker   Smokeless Tobacco    Never Used     Family History: non-contributory    Meds/Allergies   current meds:   Current Facility-Administered Medications   Medication Dose Route Frequency    sodium chloride 0 9 % bolus 1,000 mL  1,000 mL Intravenous Once    and PTA meds:   Prior to Admission Medications   Prescriptions Last Dose Informant Patient Reported? Taking? Cholecalciferol (VITAMIN D3) 1000 units CHEW   No Yes   Sig: Chew 1 tablet (1,000 Units total) 2 (two) times a day   Insulin Syringe-Needle U-100 (BD INSULIN SYRINGE ULTRAFINE) 31G X 5/16" 1 ML MISC  Family Member No Yes   Sig: Inject under the skin 2 (two) times a day   LANTUS 100 UNIT/ML subcutaneous injection  Family Member No Yes   Sig: Inject 20 Units under the skin 2 (two) times a day   Patient taking differently: Inject 18 Units under the skin 2 (two) times a day     Mometasone Furoate (ASMANEX HFA) 100 MCG/ACT AERO  Family Member No Yes   Sig: Inhale 2 puffs once daily   Nutritional Supplements (UTYMAX PO)   Yes Yes   Sig: Take by mouth   acetaminophen (TYLENOL) 650 mg CR tablet  Family Member No Yes   Sig: Take 1 tablet (650 mg total) by mouth every 8 (eight) hours as needed for mild pain Do not take in conjunction with Percocet     albuterol (VENTOLIN HFA) 90 mcg/act inhaler  Family Member Yes Yes   Sig: Inhale 108 mcg 2 (two) times a day as needed 2 puffs bid PRN   amLODIPine (NORVASC) 10 mg tablet   No Yes   Sig: Take 1 tablet (10 mg total) by mouth daily   amiodarone 200 mg tablet  Family Member No Yes   Sig: Take 1 tablet (200 mg total) by mouth 2 (two) times a day   atorvastatin (LIPITOR) 80 mg tablet  Family Member No Yes   Sig: Take 1 tablet (80 mg total) by mouth daily   docusate sodium (COLACE) 100 mg capsule   No Yes   Sig: Take 1 capsule (100 mg total) by mouth 2 (two) times a day   fentaNYL (DURAGESIC) 25 mcg/hr   No Yes   Sig: Place 1 patch on the skin every third day for 10 doses Earliest Fill Date: 18 Max Daily Amount: 1 patch   ferrous sulfate 325 (65 Fe) mg tablet   No Yes   Sig: Take 1 tablet (325 mg total) by mouth every other day   fluticasone (FLONASE) 50 mcg/act nasal spray  Family Member Yes Yes   Si spray into each nostril daily   furosemide (LASIX) 40 mg tablet  Family Member No Yes   Sig: Take 1 tablet (40 mg total) by mouth daily   gabapentin (NEURONTIN) 100 mg capsule   No Yes   Sig: Take 1 capsule (100 mg total) by mouth 3 (three) times a day   Patient taking differently: Take 300 mg by mouth 3 (three) times a day     glucose blood test strip  Family Member Yes Yes   Si each by Other route 4 (four) times a day (before meals and at bedtime) Use as instructed   levETIRAcetam (KEPPRA) 750 mg tablet  Family Member No Yes   Sig: Take 1 tablet (750 mg total) by mouth every 12 (twelve) hours   Patient taking differently: Take 750 mg by mouth daily     levothyroxine 100 mcg tablet  Family Member No Yes   Sig: Take 1 tablet (100 mcg total) by mouth daily   lisinopril (ZESTRIL) 10 mg tablet   No Yes   Sig: Take 1 tablet (10 mg total) by mouth daily   loratadine (CLARITIN) 10 mg tablet  Family Member No Yes   Sig: Take 1 tablet (10 mg total) by mouth daily   magnesium oxide (MAG-OX) 400 mg  Family Member No Yes   Sig: Take 1 tablet (400 mg total) by mouth 2 (two) times a day   metFORMIN (GLUCOPHAGE) 1000 MG tablet  Family Member Yes Yes   Si,000 mg 2 (two) times a day   metoclopramide (REGLAN) 10 mg tablet  Family Member No Yes   Sig: Take 0 5 tablets (5 mg total) by mouth every 6 (six) hours as needed (Nausea and vomiting)   omeprazole (PriLOSEC) 20 mg delayed release capsule   Yes Yes   Sig: Take 20 mg by mouth daily   oxyCODONE (ROXICODONE) 5 mg immediate release tablet   Yes Yes   Sig: Take 5 mg by mouth every 4 (four) hours as needed for moderate pain   polyethylene glycol (MIRALAX) 17 g packet  Family Member Yes Yes   Sig: Take 17 g by mouth daily as needed     potassium chloride (K-DUR,KLOR-CON) 10 mEq tablet  Family Member No Yes   Sig: Take 1 tablet (10 mEq total) by mouth 2 (two) times a day   rivaroxaban (XARELTO) 20 mg tablet  Family Member No Yes   Sig: Take 1 tablet (20 mg total) by mouth daily   saccharomyces boulardii (FLORASTOR) 250 mg capsule   Yes Yes   Sig: Take 250 mg by mouth 2 (two) times a day      Facility-Administered Medications: None     Allergies   Allergen Reactions    Other Chest Pain     IVP-listed as "chest pain" in previous chart, patient stated this occurred "a long time ago" but does not remember exactly occurred     Contrast [Iodinated Diagnostic Agents] Other (See Comments)     Flash pulm edema    Doxycycline Chest Pain    Ketorolac Other (See Comments)     Chest pain     Levaquin [Levofloxacin] Chest Pain    Ondansetron Chest Pain     Prolonged QT    Toradol [Ketorolac Tromethamine] Chest Pain       Objective   First Vitals:   Blood Pressure: 130/57 (18 1242)  Pulse: 61 (18 1242)  Temperature: 98 2 °F (36 8 °C) (18 1242)  Temp Source: Oral (18 124)  Respirations: 18 (18 124)  Weight - Scale: 69 9 kg (154 lb) (18 1240)  SpO2: 91 % (18 1242)    Current Vitals:   Blood Pressure: 133/56 (18 1430)  Pulse: 62 (18 1430)  Temperature: 98 2 °F (36 8 °C) (09/07/18 1242)  Temp Source: Oral (09/07/18 1242)  Respirations: 18 (09/07/18 1242)  Weight - Scale: 69 9 kg (154 lb) (09/07/18 1240)  SpO2: 92 % (09/07/18 1430)      Intake/Output Summary (Last 24 hours) at 09/07/18 1703  Last data filed at 09/07/18 1649   Gross per 24 hour   Intake              500 ml   Output                0 ml   Net              500 ml       Invasive Devices     Peripheral Intravenous Line            Peripheral IV 09/07/18 Right Antecubital less than 1 day                Physical Exam   Constitutional: She is oriented to person, place, and time  She appears well-developed and well-nourished  HENT:   Head: Normocephalic and atraumatic  Mouth/Throat: Oropharynx is clear and moist    Eyes: Conjunctivae and EOM are normal  Pupils are equal, round, and reactive to light  Neck: Normal range of motion  Neck supple  No JVD present  Cardiovascular: Normal rate, regular rhythm and intact distal pulses  Pulses:  Carotids: 2+ BL  Radials: 2+ BL  Femorals: 2+ BL  PTs: + Biphasic Signals  R DP: + Biphasic Signal  L DP: + Monophasic Signal   Pulmonary/Chest: Effort normal and breath sounds normal  No respiratory distress  Abdominal: Soft  She exhibits no distension  There is no tenderness  Musculoskeletal: Normal range of motion  She exhibits no edema  Neurological: She is alert and oriented to person, place, and time  Skin: Skin is warm  LLE: Left great toe w/ + dusky, with moist ulceration at the base and proximal erythema accross the foot  The entire foot is warm, + sensation distally, albeit decreased compared to right, there is an area of white over the plantar surface which likely contained the pus express onto the patients shoe yesterday  Psychiatric: She has a normal mood and affect   Her behavior is normal                Lab Results: CBC:   Lab Results   Component Value Date    WBC 11 54 (H) 09/07/2018    HGB 8 7 (L) 09/07/2018    HCT 29 3 (L) 09/07/2018 MCV 81 (L) 09/07/2018     09/07/2018    MCH 24 2 (L) 09/07/2018    MCHC 29 7 (L) 09/07/2018    RDW 17 8 (H) 09/07/2018    MPV 9 9 09/07/2018    NRBC 0 09/07/2018   , CMP:   Lab Results   Component Value Date     (L) 09/07/2018    K 5 7 (H) 09/07/2018     09/07/2018    CO2 25 09/07/2018    BUN 45 (H) 09/07/2018    CREATININE 1 41 (H) 09/07/2018    CALCIUM 9 0 09/07/2018    EGFR 35 09/07/2018   , Coagulation:   Lab Results   Component Value Date    INR 3 04 (H) 09/07/2018   , Urinalysis: No results found for: [place] Fraction, SPECGRAV, PHUR, LEUKOCYTESUR, NITRITE, PROTEINUA, GLUCOSEU, KETONESU, BILIRUBINUR, BLOODU, Amylase: No results found for: AMYLASE, Lipase: No results found for: LIPASE  Imaging: I have personally reviewed pertinent reports  Abdominal aortogram with left lower extremity arteriography 8/21/18: Findings:      1  Patent abdominal aorta  Patent single bilateral renal arteries  Patent superior mesenteric artery  Patent inferior mesenteric artery      2  Patent and tortuous bilateral common iliac arteries, internal iliac arteries, and external iliac arteries     3  Focal filling defect in the left common femoral artery, eccentric  This was readily identified on ultrasound where it was a hypoechoic ovoid filling defect extending into the lumen, measuring 5-6 mm in maximum dimension  The filling defect was   adjacent to the prior arterial access tract      4  Left lower extremity runoff with patent superficial femoral artery with eccentric multifocal diffuse mild disease  The profunda femoral artery is well-developed  The popliteal artery has multifocal mild to moderate stenosis  More distally, there is   severe three-vessel disease in the calf  The anterior tibial artery occludes just past its origin  The peroneal is patent for several centimeters, then inferiorly of the foot is dependent on collaterals  The posterior tibial is not visualized      Although there is distal collateralization, there are decreased number of vessels supplying the foot when compared to prior angiogram of December 19, 2017      IMPRESSION:  Impression:      1  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

## 2018-09-07 NOTE — ED PROVIDER NOTES
History  Chief Complaint   Patient presents with    Foot Ulcer     PER ems, "FROM [facility], SENT FOR EVAL OF BILAT TOE INFECTIONS, ONGOING PROBLEM"     [de-identified] yo F with history of chronic toe wounds and known severe vascular disease presents to ED for worsening bilateral foot pain and "discoloration on L foot " Pt is a resident at [facility] and is somewhat of a poor historian  Pt says she has had R sided vascular procedure done and is to have same done to her L leg in the future  Pt says that dressing changes for her wounds are done by the facility nurse, so she is unsure when the discoloration was first noticed but thinks it was in the last few days  She is unsure if there has been worsening drainage or increased redness  She denies feeling ill but says she was told by RN that she did have a fever  Pt also complains of L shoulder pain, which she says has been present and constant for about 2 wks  She denies inciting injury/event  She says she has not been evaluated for this pain, but she says because of the pain she is unable to lift her L arm  No numbness/tingling of the LUE  Prior to Admission Medications   Prescriptions Last Dose Informant Patient Reported? Taking?    Cholecalciferol (VITAMIN D3) 1000 units CHEW   No Yes   Sig: Chew 1 tablet (1,000 Units total) 2 (two) times a day   Insulin Syringe-Needle U-100 (BD INSULIN SYRINGE ULTRAFINE) 31G X 5/16" 1 ML MISC  Family Member No Yes   Sig: Inject under the skin 2 (two) times a day   LANTUS 100 UNIT/ML subcutaneous injection  Family Member No Yes   Sig: Inject 20 Units under the skin 2 (two) times a day   Patient taking differently: Inject 18 Units under the skin 2 (two) times a day     Mometasone Furoate (ASMANEX HFA) 100 MCG/ACT AERO  Family Member No Yes   Sig: Inhale 2 puffs once daily   Nutritional Supplements (UTYMAX PO)   Yes Yes   Sig: Take by mouth   acetaminophen (TYLENOL) 650 mg CR tablet  Family Member No Yes Sig: Take 1 tablet (650 mg total) by mouth every 8 (eight) hours as needed for mild pain Do not take in conjunction with Percocet     albuterol (VENTOLIN HFA) 90 mcg/act inhaler  Family Member Yes Yes   Sig: Inhale 108 mcg 2 (two) times a day as needed 2 puffs bid PRN   amLODIPine (NORVASC) 10 mg tablet   No Yes   Sig: Take 1 tablet (10 mg total) by mouth daily   amiodarone 200 mg tablet  Family Member No Yes   Sig: Take 1 tablet (200 mg total) by mouth 2 (two) times a day   atorvastatin (LIPITOR) 80 mg tablet  Family Member No Yes   Sig: Take 1 tablet (80 mg total) by mouth daily   docusate sodium (COLACE) 100 mg capsule   No Yes   Sig: Take 1 capsule (100 mg total) by mouth 2 (two) times a day   fentaNYL (DURAGESIC) 25 mcg/hr   No Yes   Sig: Place 1 patch on the skin every third day for 10 doses Earliest Fill Date: 18 Max Daily Amount: 1 patch   ferrous sulfate 325 (65 Fe) mg tablet   No Yes   Sig: Take 1 tablet (325 mg total) by mouth every other day   fluticasone (FLONASE) 50 mcg/act nasal spray  Family Member Yes Yes   Si spray into each nostril daily   furosemide (LASIX) 40 mg tablet  Family Member No Yes   Sig: Take 1 tablet (40 mg total) by mouth daily   gabapentin (NEURONTIN) 100 mg capsule   No Yes   Sig: Take 1 capsule (100 mg total) by mouth 3 (three) times a day   Patient taking differently: Take 300 mg by mouth 3 (three) times a day     glucose blood test strip  Family Member Yes Yes   Si each by Other route 4 (four) times a day (before meals and at bedtime) Use as instructed   levETIRAcetam (KEPPRA) 750 mg tablet  Family Member No Yes   Sig: Take 1 tablet (750 mg total) by mouth every 12 (twelve) hours   Patient taking differently: Take 750 mg by mouth daily     levothyroxine 100 mcg tablet  Family Member No Yes   Sig: Take 1 tablet (100 mcg total) by mouth daily   lisinopril (ZESTRIL) 10 mg tablet   No Yes   Sig: Take 1 tablet (10 mg total) by mouth daily   loratadine (CLARITIN) 10 mg tablet  Family Member No Yes   Sig: Take 1 tablet (10 mg total) by mouth daily   magnesium oxide (MAG-OX) 400 mg  Family Member No Yes   Sig: Take 1 tablet (400 mg total) by mouth 2 (two) times a day   metFORMIN (GLUCOPHAGE) 1000 MG tablet  Family Member Yes Yes   Si,000 mg 2 (two) times a day   metoclopramide (REGLAN) 10 mg tablet  Family Member No Yes   Sig: Take 0 5 tablets (5 mg total) by mouth every 6 (six) hours as needed (Nausea and vomiting)   omeprazole (PriLOSEC) 20 mg delayed release capsule   Yes Yes   Sig: Take 20 mg by mouth daily   oxyCODONE (ROXICODONE) 5 mg immediate release tablet   Yes Yes   Sig: Take 5 mg by mouth every 4 (four) hours as needed for moderate pain   polyethylene glycol (MIRALAX) 17 g packet  Family Member Yes Yes   Sig: Take 17 g by mouth daily as needed     potassium chloride (K-DUR,KLOR-CON) 10 mEq tablet  Family Member No Yes   Sig: Take 1 tablet (10 mEq total) by mouth 2 (two) times a day   rivaroxaban (XARELTO) 20 mg tablet  Family Member No Yes   Sig: Take 1 tablet (20 mg total) by mouth daily   saccharomyces boulardii (FLORASTOR) 250 mg capsule   Yes Yes   Sig: Take 250 mg by mouth 2 (two) times a day      Facility-Administered Medications: None       Past Medical History:   Diagnosis Date    Altered mental status     Anemia     Anticoagulated     Xarelto for Afib/ flutter    Asthma     Atrial flutter (HCC)     maintained on anticoag w/ Xarelto    Bradycardia     CAD (coronary artery disease)     Candidiasis     Carotid stenosis, bilateral     Cataract     Chest pain     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[facility]     Chronic fatigue syndrome     Chronic low back pain     Chronic ulcer of toes (HCC)     left and right    Concussion w loss of consciousness of unsp duration, init     [facility]    Diabetes mellitus (HCC)     type 2, insulin dependent    DJD (degenerative joint disease)     Expressive aphasia     Foot pain     GERD (gastroesophageal reflux disease)     Herpes zoster     HLD (hyperlipidemia)     HTN (hypertension)     Hypothyroidism     Irritable bowel syndrome     Kidney stone     Lumbar radiculopathy     Lyme disease     Dx in hospital 8/2011    [facility] infarction) [facility]     Migraines     Muscle spasm     Non-neoplastic nevus     Nontoxic multinodular goiter     NSVT (nonsustained ventricular tachycardia) (HCC)     Osteoarthritis     Other chronic pain     PAD (peripheral artery disease) (HCC)     Palpitations     Paroxysmal atrial fibrillation (HCC)     Pseudogout     Pulmonary hypertension (HCC)     S/P TAVR (transcatheter aortic valve replacement)     Seizures (HCC)     Severe sepsis [facility]     Spinal stenosis     Stroke ([facility] ) 05/2018    Transient cerebral ischemia     Trigger ring finger     Viral gastroenteritis        Past Surgical History:   Procedure Laterality Date    APPENDECTOMY      ARTERIOGRAM  12/19/2017    CARDIAC CATHETERIZATION      CHOLECYSTECTOMY      COLONOSCOPY      CORONARY ARTERY BYPASS GRAFT  2004    IR ABDOMINAL ANGIOGRAPHY / INTERVENTION  8/10/2018    IR ABDOMINAL ANGIOGRAPHY / INTERVENTION  8/21/2018    LUMBAR LAMINECTOMY      VT ECHO TRANSESOPHAG R-T 2D W/PRB IMG ACQUISJ I&R N/A 4/3/2018    Procedure: TRANSESOPHAGEAL ECHOCARDIOGRAM (ROBB);   Surgeon: Prema Saenz DO;  [facility]: [facility];  Service: Cardiac Surgery    VT REPLACE AORTIC VALVE OPENFEMORAL ARTERY APPROACH N/A 4/3/2018    Procedure: REPLACEMENT AORTIC VALVE TRANSCATHETER (TAVR) TRANSFEMORAL w/ 26MM IVY YEVGENIY S3 VALVE;  Surgeon: Prema Saenz DO;  Location: [facility];  Service: Cardiac Surgery    THYROIDECTOMY      TOTAL ABDOMINAL HYSTERECTOMY W/ BILATERAL SALPINGOOPHORECTOMY         Family History   Problem Relation Age of Onset    Cancer Mother     Stroke Mother     Cancer Father     Heart disease Father    Lilia Cleary Breast cancer Sister    Campbell Patel Diabetes Daughter         Passed away January 2017      I have reviewed and agree with the history as documented  Social History   Substance Use Topics    Smoking status: Never Smoker    Smokeless tobacco: Never Used    Alcohol use No        Review of Systems   Constitutional: Negative for activity change, chills and fatigue  HENT: Negative for congestion, rhinorrhea, sore throat and trouble swallowing  Eyes: Negative for pain, discharge and visual disturbance  Respiratory: Negative for cough, chest tightness and shortness of breath  Cardiovascular: Positive for leg swelling  Negative for chest pain and palpitations  Gastrointestinal: Negative for abdominal pain, nausea and vomiting  Genitourinary: Negative for dysuria, frequency and urgency  Musculoskeletal: Positive for arthralgias and gait problem  Negative for neck pain and neck stiffness  Skin: Positive for color change and wound  Negative for pallor  Neurological: Negative for dizziness, syncope, light-headedness and headaches  Physical Exam  ED Triage Vitals [09/07/18 1242]   Temperature Pulse Respirations Blood Pressure SpO2   98 2 °F (36 8 °C) 61 18 130/57 91 %      Temp Source Heart Rate Source Patient Position - Orthostatic VS BP Location FiO2 (%)   Oral Monitor Sitting Left arm --      Pain Score       Worst Possible Pain           Orthostatic Vital Signs  Vitals:    09/07/18 1415 09/07/18 1430 09/07/18 1838 09/07/18 2309   BP:  133/56 141/86 131/94   Pulse: 62 62 58 68   Patient Position - Orthostatic VS:   Lying Sitting       Physical Exam   Constitutional: She is oriented to person, place, and time  She appears well-developed and well-nourished  No distress  HENT:   Head: Normocephalic and atraumatic  Mouth/Throat: Oropharynx is clear and moist    Eyes: Conjunctivae and EOM are normal  Right eye exhibits no discharge  Left eye exhibits no discharge  Neck: Normal range of motion   Neck supple  Cardiovascular: Normal rate, regular rhythm and intact distal pulses  Pulmonary/Chest: Effort normal and breath sounds normal  No stridor  No respiratory distress  Abdominal: Soft  There is no tenderness  There is no rebound and no guarding  Musculoskeletal: She exhibits tenderness  She exhibits no edema or deformity  L shoulder tenderness- unable to Iift L arm due to pain  Normal passive ROM, but pain worse while lowering arm down  Neurological: She is alert and oriented to person, place, and time  A sensory deficit (decreased sensation to bilateral feet  minimal sensation to L great toe) is present  Skin: Skin is warm and dry  Purple L great toe  Chronic wounds without evidence of acute infection  See photos below   Nursing note and vitals reviewed                      ED Medications  Medications   albuterol (PROVENTIL HFA,VENTOLIN HFA) inhaler 1 puff (not administered)   amiodarone tablet 200 mg (200 mg Oral Given 9/7/18 2132)   amLODIPine (NORVASC) tablet 10 mg (not administered)   atorvastatin (LIPITOR) tablet 80 mg (not administered)   cholecalciferol (VITAMIN D3) tablet 1,000 Units (not administered)   docusate sodium (COLACE) capsule 100 mg (100 mg Oral Given 9/7/18 2132)   gabapentin (NEURONTIN) capsule 100 mg (100 mg Oral Given 9/7/18 2132)   insulin glargine (LANTUS) subcutaneous injection 18 Units 0 18 mL (18 Units Subcutaneous Given 9/7/18 2130)   levETIRAcetam (KEPPRA) tablet 750 mg (not administered)   levothyroxine tablet 100 mcg (not administered)   magnesium oxide (MAG-OX) tablet 400 mg (400 mg Oral Given 9/7/18 2132)   fluticasone (FLOVENT HFA) 110 MCG/ACT inhaler 1 puff (1 puff Inhalation Given 9/7/18 2142)   sodium chloride 0 9 % infusion (75 mL/hr Intravenous New Bag 9/7/18 2130)   labetalol (NORMODYNE) injection 5 mg (not administered)   insulin lispro (HumaLOG) 100 units/mL subcutaneous injection 1-5 Units (not administered)   insulin lispro (HumaLOG) 100 units/mL subcutaneous injection 1-5 Units (0 Units Subcutaneous Not Given 9/7/18 2134)   acetaminophen (TYLENOL) tablet 650 mg (not administered)   heparin (porcine) 25,000 units in 250 mL infusion (premix) (12 Units/kg/hr × 65 kg (Order-Specific) Intravenous New Bag 9/8/18 0008)   ceFAZolin (ANCEF) IVPB (premix) 1,000 mg (1,000 mg Intravenous New Bag 9/7/18 2130)   metroNIDAZOLE (FLAGYL) tablet 500 mg (not administered)   HYDROmorphone (DILAUDID) injection 0 5 mg (0 5 mg Intravenous Given 9/8/18 0002)   sodium chloride 0 9 % bolus 500 mL (0 mL Intravenous Stopped 9/7/18 1649)   fentanyl citrate (PF) 100 MCG/2ML 25 mcg (25 mcg Intravenous Given 9/7/18 1645)   sodium chloride 0 9 % bolus 1,000 mL (500 mL Intravenous New Bag 9/7/18 1648)       Diagnostic Studies  Results Reviewed     Procedure Component Value Units Date/Time    Protime-INR [[id number]]  (Abnormal) Collected:  09/07/18 1503    Lab Status:  Final result Specimen:  Blood from Arm, Right Updated:  09/07/18 1537     Protime 31 5 (H) seconds      INR 3 04 (H)    APTT [[id number]]  (Abnormal) Collected:  09/07/18 1503    Lab Status:  Final result Specimen:  Blood from Arm, Right Updated:  09/07/18 1537     PTT 57 (H) seconds     Basic metabolic panel [[id number]]  (Abnormal) Collected:  09/07/18 1503    Lab Status:  Final result Specimen:  Blood from Arm, Right Updated:  09/07/18 1535     Sodium 135 (L) mmol/L      Potassium 5 7 (H) mmol/L      Chloride 103 mmol/L      CO2 25 mmol/L      ANION GAP 7 mmol/L      BUN 45 (H) mg/dL      Creatinine 1 41 (H) mg/dL      Glucose 98 mg/dL      Calcium 9 0 mg/dL      eGFR 35 ml/min/1 73sq m     Narrative:         National Kidney Disease Education Program recommendations are as follows:  GFR calculation is accurate only with a steady state creatinine  Chronic Kidney disease less than 60 ml/min/1 73 sq  meters  Kidney failure less than 15 ml/min/1 73 sq  meters      CBC and differential [[id number]]  (Abnormal) Collected:  09/07/18 1502 Lab Status:  Final result Specimen:  Blood from Arm, Right Updated:  09/07/18 1519     WBC 11 54 (H) Thousand/uL      RBC 3 60 (L) Million/uL      Hemoglobin 8 7 (L) g/dL      Hematocrit 29 3 (L) %      MCV 81 (L) fL      MCH 24 2 (L) pg      MCHC 29 7 (L) g/dL      RDW 17 8 (H) %      MPV 9 9 fL      Platelets 698 Thousands/uL      nRBC 0 /100 WBCs      Neutrophils Relative 80 (H) %      Immat GRANS % 1 %      Lymphocytes Relative 9 (L) %      Monocytes Relative 8 %      Eosinophils Relative 2 %      Basophils Relative 0 %      Neutrophils Absolute 9 22 (H) Thousands/µL      Immature Grans Absolute 0 09 Thousand/uL      Lymphocytes Absolute 1 03 Thousands/µL      Monocytes Absolute 0 96 Thousand/µL      Eosinophils Absolute 0 20 Thousand/µL      Basophils Absolute 0 04 Thousands/µL                  XR foot 3+ vw left   Final Result by Haroon Ortega MD (09/07 8552)      Diffuse swelling throughout the foot  No evidence of gas or collection  No radiographic evidence of osteomyelitis  Workstation performed: [id number]         XR shoulder 2+ views LEFT   ED Interpretation by Anderson Frankel MD (09/07 9517)   The shoulder was ordered by resident and interpreted by me independently  On my read, it appears:   - arthritic changes   - no obvious fracture      Final Result by Tee Cannon MD (09/07 2136)      Relatively prominent ossific density projecting between acromion and humeral head felt to most likely be a large bone spur  This might be impinging on rotator cuff  Suggest further workup with MRI            Workstation performed: [id number]         XR chest pa & lateral    (Results Pending)         Procedures  Procedures      Phone Consults  ED Phone Contact    ED Course                               MDM  Number of Diagnoses or Management Options  LEONARD (acute kidney injury) 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):   Diagnosis management comments: Pt scheduled for OR with vascular surgery on 9/12  Vascular surgery consulted and says no indication for earlier procedure, concerned for possible wet gangrene  Pt does also have LEONARD as well  Recommended admission to [facility] with podiatry consult  Vascular surgery will continue to follow  CritCare Time    Disposition  Final diagnoses:   PAD (peripheral artery disease) ([facility] )   Wet gangrene ([facility] )   [facility]     Time reflects when diagnosis was documented in both MDM as applicable and the Disposition within this note     Time User Action Codes Description Comment    9/7/2018  3:49 PM Henderson July Add [I73 9] PAD (peripheral artery disease) ([facility] )     9/7/2018  3:49 PM Henderson July Modify [I73 9] PAD (peripheral artery disease) ([facility] )     9/7/2018  3:49 PM Jose July Modify [I73 9] PAD (peripheral artery disease) ([facility] )     9/7/2018  5:28 PM Henderson July Add [I96] Wet gangrene ([facility] )     9/7/2018  5:28 PM Jose July Add [N17 9] LEONARD (acute kidney injury) ([facility] )     9/7/2018  5:28 PM Henderson July Modify [I73 9] PAD (peripheral artery disease) ([facility] )     9/7/2018  5:28 PM Henderson July Modify [N17 9] LEONARD (acute kidney injury) ([facility] )     9/7/2018  6:34 PM Adi Benton Add [I70 269] 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 ([facility] )     9/7/2018  6:34 PM Adi Benton Modify [I70 269] 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 [facility])       ED Disposition     ED Disposition Condition Comment    Admit  Case was discussed with [facility] and the patient's admission status was agreed to be Admission Status: inpatient status to the service of Dr Elvis Cm   Follow-up Information    None         Current Discharge Medication List      CONTINUE these medications which have NOT CHANGED    Details   acetaminophen (TYLENOL) 650 mg CR tablet Take 1 tablet (650 mg total) by mouth every 8 (eight) hours as needed for mild pain Do not take in conjunction with Percocet    Qty: 90 tablet, Refills: 0    Associated Diagnoses: S/P TAVR (transcatheter aortic valve replacement)      albuterol (VENTOLIN HFA) 90 mcg/act inhaler Inhale 108 mcg 2 (two) times a day as needed 2 puffs bid PRN      amiodarone 200 mg tablet Take 1 tablet (200 mg total) by mouth 2 (two) times a day  Qty: 60 tablet, Refills: 3    Associated Diagnoses: Atrial flutter, unspecified type (HCC)      amLODIPine (NORVASC) 10 mg tablet Take 1 tablet (10 mg total) by mouth daily  Qty: 30 tablet, Refills: 0    Associated Diagnoses: Essential hypertension      atorvastatin (LIPITOR) 80 mg tablet Take 1 tablet (80 mg total) by mouth daily  Qty: 30 tablet, Refills: 2    Associated Diagnoses: Mixed hyperlipidemia      Cholecalciferol (VITAMIN D3) 1000 units CHEW Chew 1 tablet (1,000 Units total) 2 (two) times a day  Qty: 60 tablet, Refills: 5    Associated Diagnoses: Vitamin D deficiency      docusate sodium (COLACE) 100 mg capsule Take 1 capsule (100 mg total) by mouth 2 (two) times a day  Qty: 60 capsule, Refills: 0    Associated Diagnoses: Foot pain      fentaNYL (DURAGESIC) 25 mcg/hr Place 1 patch on the skin every third day for 10 doses Earliest Fill Date: 8/26/18 Max Daily Amount: 1 patch  Qty: 10 patch, Refills: 0    Associated Diagnoses: Foot pain      ferrous sulfate 325 (65 Fe) mg tablet Take 1 tablet (325 mg total) by mouth every other day  Qty: 30 tablet, Refills: 0    Associated Diagnoses: Iron deficiency      fluticasone (FLONASE) 50 mcg/act nasal spray 1 spray into each nostril daily      furosemide (LASIX) 40 mg tablet Take 1 tablet (40 mg total) by mouth daily  Qty: 90 tablet, Refills: 3    Associated Diagnoses: Chronic systolic heart failure (HCC)      gabapentin (NEURONTIN) 100 mg capsule Take 1 capsule (100 mg total) by mouth 3 (three) times a day  Qty: 90 capsule, Refills: 0    Associated Diagnoses:  Foot pain      glucose blood test strip 1 each by Other route 4 (four) times a day (before meals and at bedtime) Use as instructed      Insulin Syringe-Needle U-100 (BD INSULIN SYRINGE ULTRAFINE) 31G X 5/16" 1 ML MISC Inject under the skin 2 (two) times a day  Qty: 200 each, Refills: 3    Associated Diagnoses: Type 2 diabetes mellitus with complication, with long-term current use of insulin ([facility])      LANTUS 100 UNIT/ML subcutaneous injection Inject 20 Units under the skin 2 (two) times a day  Qty: 10 mL, Refills: 0    Associated Diagnoses: Type 2 diabetes mellitus with complication, with long-term current use of insulin ([facility])      levETIRAcetam (KEPPRA) 750 mg tablet Take 1 tablet (750 mg total) by mouth every 12 (twelve) hours  Qty: 60 tablet, Refills: 11    Associated Diagnoses: Seizure ([facility])      levothyroxine 100 mcg tablet Take 1 tablet (100 mcg total) by mouth daily  Qty: 90 tablet, Refills: 1    Associated Diagnoses: Hypothyroidism, unspecified type      lisinopril (ZESTRIL) 10 mg tablet Take 1 tablet (10 mg total) by mouth daily  Qty: 30 tablet, Refills: 0    Associated Diagnoses: Chronic systolic heart failure ([facility])      loratadine (CLARITIN) 10 mg tablet Take 1 tablet (10 mg total) by mouth daily  Qty: 90 tablet, Refills: 1    Associated Diagnoses: URI, acute      magnesium oxide (MAG-OX) 400 mg Take 1 tablet (400 mg total) by mouth 2 (two) times a day  Qty: 180 tablet, Refills: 1    Associated Diagnoses: Hypomagnesemia      metFORMIN (GLUCOPHAGE) 1000 MG tablet 1,000 mg 2 (two) times a day      metoclopramide (REGLAN) 10 mg tablet Take 0 5 tablets (5 mg total) by mouth every 6 (six) hours as needed (Nausea and vomiting)  Qty: 30 tablet, Refills: 1    Associated Diagnoses: Viral gastroenteritis      Mometasone Furoate (ASMANEX HFA) 100 MCG/ACT AERO Inhale 2 puffs once daily  Qty: 1 Inhaler, Refills: 5    Associated Diagnoses:  Moderate asthma without complication, unspecified whether persistent      Nutritional Supplements (UTYMAX PO) Take by mouth      omeprazole (PriLOSEC) 20 mg delayed release capsule Take 20 mg by mouth daily      oxyCODONE (ROXICODONE) 5 mg immediate release tablet Take 5 mg by mouth every 4 (four) hours as needed for moderate pain      polyethylene glycol (MIRALAX) 17 g packet Take 17 g by mouth daily as needed        potassium chloride (K-DUR,KLOR-CON) 10 mEq tablet Take 1 tablet (10 mEq total) by mouth 2 (two) times a day  Qty: 60 tablet, Refills: 2    Associated Diagnoses: Hypokalemia      rivaroxaban (XARELTO) 20 mg tablet Take 1 tablet (20 mg total) by mouth daily  Qty: 90 tablet, Refills: 0    Associated Diagnoses: Paroxysmal atrial fibrillation (HCC)      saccharomyces boulardii (FLORASTOR) 250 mg capsule Take 250 mg by mouth 2 (two) times a day           No discharge procedures on file  ED Provider  Attending physically available and evaluated Bertis Councilman I managed the patient along with the ED Attending      Electronically Signed by         Yola Nolasco MD  09/08/18 7685

## 2018-09-07 NOTE — ED NOTES
ARRIVED VIA EMS, FROM [facility], FOR EVAL OF ONGOING BILATERAL TOE PAIN, ARRIVED WITH BOTH FEET WRAPPED, NO DRAINAGE, NO PROXIMAL REDNESS, WAS GIVEN AN OXY PTA    NOTED ON MED REC THAT PT IS FOR SURGERY 9/12     Hector Flores RN  09/07/18 [facility] TOMY Herrmann  09/07/18 9007

## 2018-09-07 NOTE — ED ATTENDING ATTESTATION
Mt Roldan MD, saw and evaluated the patient  All available labs and X-rays were ordered by me or the resident and have been reviewed by myself  I discussed the patient with the resident / non-physician and agree with the resident's / non-physician practitioner's findings and plan as documented in the resident's / non-physician practicitioner's note, except where noted  At this point, I agree with the current assessment done in the ED  Chief Complaint   Patient presents with    Foot Ulcer     PER ems, "[facility], SENT FOR EVAL OF BILAT TOE INFECTIONS, ONGOING PROBLEM"     [de-identified] F:  - she has known PAD  For the past 2 weeks she has had discoloration of her left 1st toe  In the last few days it might have gotten blue more than red so she is here  - subjective fevers at facility    - DP palpable bilaterally  - proceudre on right and supposed to do on left on the 12th    - no n/v/cp/sob currently  - On ROS, mentions left shoulder pain that's been going on continuously for two weeks  It can passively ranged upwards, but putting it down that elicits severe pain  No falls, trauma     - Per chart, she has known atherosclerosis of artery with dry gangrene with plan for exploration of left plantar artery and possibly bypass; this is planned for the 12th  PMH:  - chronic foot ulcers  - PAD  - csdHF  - Carotid stenosis  - HTN  - HLD  - AFlutter on anticoagulation  - CAD  - DJD  - GERD  - Migraines  - Hypothyroidism  - MI  - Asthma  - IBS  - OA  - Foot pain  - Pulmonary HTN  - NSVT  - TAVR  - CVA, stroke, seizures  PSH:  - arteriogram  - Marylou  - appendectomy  - PRABHA BSO  - Coloscopy  - Thyroidectomy  - CAD  - AV replacement  No smoking, drinking, drugs  PE:  Vitals:    09/08/18 2330 09/09/18 0635 09/09/18 0749 09/09/18 1559   BP: 139/67  150/65 163/72   BP Location: Left arm  Left arm Left arm   Pulse: 66  62 67   Resp: 18 18 18   Temp: 98 7 °F (37 1 °C)  98 1 °F (36 7 °C) 98 °F (36 7 °C)   TempSrc: Oral  Oral Oral   SpO2: 91%  96% 91%   Weight:  73 1 kg (161 lb 2 5 oz)     Height:       General: VSS, NAD, awake, alert  Well-nourished, well-developed  Appears stated age  Speaking normally in full sentences  Head: Normocephalic, atraumatic, nontender  Eyes: PERRL, EOM-I  No diplopia  No hyphema  No subconjunctival hemorrhages  Symmetrical lids  ENT: Atraumatic external nose and ears  MMM  No malocclusion  No stridor  Normal phonation  No drooling  Normal swallowing  Neck: Symmetric, trachea midline  No JVD  CV: RRR  +S1/S2  No murmurs or gallops  Peripheral pulses +2 throughout  No chest wall tenderness  Lungs:   Unlabored No retractions  CTAB, lungs sounds equal bilateral    No tachypnea  Abd: +BS, soft, NT/ND    MSK:   FROM   Back:   No rashes  Skin:   Sacral decubitous ulceration present  The left 1st toe appears purple, discolored  Cellulitis present on the lateral aspect  The redness looks similar to previous picture but the toe is newly discolored  Sensation decreased over the cyanotic area  Neuro: AAOx3, GCS 15, CN II-XII grossly intact  Motor grossly intact  Psychiatric/Behavioral: Appropriate mood and affect   Exam: deferred  A/P:  - labs  - discuss w/ vascular  - 13 point ROS was performed and all are normal unless stated in the history above  - Nursing note reviewed  Vitals reviewed  - Orders placed by myself and/or advanced practitioner / resident     - Previous chart was reviewed  - No language barrier    - History obtained from patient  - There are no limitations to the history obtained  - Critical care time: Not applicable for this patient  Final Diagnosis:  1  LEONARD (acute kidney injury) ([address] )    2  PAD (peripheral artery disease) ([address] )    3  Wet gangrene ([facility] [address] )    4  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 [facility])        ED Course as of Sep 09 1916   Fri Sep 07, 2018   1536 LEONARD    Creatinine: (!) 1 41   1705 Seen by surgery  Patient likely to be admitted medically for LEONARD         Medications   albuterol (PROVENTIL HFA,VENTOLIN HFA) inhaler 1 puff (not administered)   amiodarone tablet 200 mg (200 mg Oral Given 9/9/18 1725)   amLODIPine (NORVASC) tablet 10 mg (10 mg Oral Given 9/9/18 0905)   atorvastatin (LIPITOR) tablet 80 mg (80 mg Oral Given 9/9/18 1725)   cholecalciferol (VITAMIN D3) tablet 1,000 Units (1,000 Units Oral Given 9/9/18 0905)   docusate sodium (COLACE) capsule 100 mg (100 mg Oral Not Given 9/9/18 1726)   gabapentin (NEURONTIN) capsule 100 mg (100 mg Oral Given 9/9/18 1551)   insulin glargine (LANTUS) subcutaneous injection 18 Units 0 18 mL (18 Units Subcutaneous Given 9/9/18 1725)   levETIRAcetam (KEPPRA) tablet 750 mg (750 mg Oral Given 9/9/18 0905)   levothyroxine tablet 100 mcg (100 mcg Oral Given 9/9/18 0538)   magnesium oxide (MAG-OX) tablet 400 mg (400 mg Oral Given 9/9/18 1725)   fluticasone (FLOVENT HFA) 110 MCG/ACT inhaler 1 puff (1 puff Inhalation Given 9/9/18 0906)   labetalol (NORMODYNE) injection 5 mg (not administered)   insulin lispro (HumaLOG) 100 units/mL subcutaneous injection 1-5 Units (1 Units Subcutaneous Not Given 9/9/18 1619)   insulin lispro (HumaLOG) 100 units/mL subcutaneous injection 1-5 Units (1 Units Subcutaneous Not Given 9/8/18 2158)   acetaminophen (TYLENOL) tablet 650 mg (not administered)   ceFAZolin (ANCEF) IVPB (premix) 1,000 mg (0 mg Intravenous Stopped 9/9/18 1139)   metroNIDAZOLE (FLAGYL) tablet 500 mg (500 mg Oral Given 9/9/18 1551)   HYDROmorphone (DILAUDID) injection 1 mg (1 mg Intravenous Given 9/9/18 1509)   fentaNYL (DURAGESIC) 50 mcg/hr TD 72 hr patch 50 mcg (50 mcg Transdermal Medication Applied 9/8/18 1521)   furosemide (LASIX) tablet 40 mg (40 mg Oral Given 9/9/18 0905)   heparin (porcine) injection 4,000 Units (not administered)   heparin (porcine) injection 2,000 Units (not administered)   heparin (porcine) 25,000 units in 250 mL infusion (premix) (12 Units/kg/hr × 70 kg (Order-Specific) Intravenous New Bag 9/9/18 1330)   sodium chloride 0 9 % bolus 500 mL (0 mL Intravenous Stopped 9/7/18 1649)   fentanyl citrate (PF) 100 MCG/2ML 25 mcg (25 mcg Intravenous Given 9/7/18 1645)   sodium chloride 0 9 % bolus 1,000 mL (500 mL Intravenous New Bag 9/7/18 1648)   HYDROmorphone (DILAUDID) injection 1 mg (1 mg Intravenous Given 9/8/18 0128)   HYDROmorphone (DILAUDID) injection 0 5 mg (0 5 mg Intravenous Given 9/9/18 0906)   heparin (porcine) injection 4,000 Units (4,000 Units Intravenous Given 9/9/18 1330)   iron sucrose (VENOFER) 200 mg in sodium chloride 0 9 % 100 mL IVPB (0 mg Intravenous Stopped 9/9/18 1702)     XR chest pa & lateral   Final Result      Mild pulmonary vascular congestion and cardiomegaly raise concern for CHF  Workstation performed: [id number]         XR foot 3+ vw left   Final Result      Diffuse swelling throughout the foot  No evidence of gas or collection  No radiographic evidence of osteomyelitis  Workstation performed: [id number]         XR shoulder 2+ views LEFT   ED Interpretation   The shoulder was ordered by resident and interpreted by me independently  On my read, it appears:   - arthritic changes   - no obvious fracture      Final Result      Relatively prominent ossific density projecting between acromion and humeral head felt to most likely be a large bone spur  This might be impinging on rotator cuff    Suggest further workup with MRI            Workstation performed: [id number]           Orders Placed This Encounter   Procedures    Wound culture and Gram stain    Anaerobic culture and Gram stain    XR shoulder 2+ views LEFT    XR foot 3+ vw left    XR chest pa & lateral    CBC and differential    Basic metabolic panel    Protime-INR    APTT    Comprehensive metabolic panel    CBC and differential    Protime-INR    Sedimentation rate, automated    C-reactive protein    APTT six (6) hours after Heparin bolus/drip initiation or dosing change    APTT    Protime-INR    Hemoglobin A1C w/ EAG Estimation    APTT    Basic metabolic panel    CBC    Prealbumin    APTT six (6) hours after Heparin bolus/drip initiation or dosing change    APTT    Protime-INR    CBC and Platelet    APTT    Diet Cardiac; Sodium 2 GM; Consistent Carbohydrate Diet Level 2 (5 carb servings/75 grams CHO/meal)    Insert peripheral IV    Nursing communcation Continue IV as ordered      Notify admitting physician    Notify admitting physician on arrival    Vital Signs per unit routine    Up with assistance    Insert peripheral IV    Maintain IV access    Insulin Subcutaneous Notify Physician    Insulin Subcutaneous Instruction    Fingerstick Glucose (POCT) Before meals and at bedtime    Heparin: ACS (low) or Straight Infusion Protocol Administration Instructions    Heparin: ASC (Low) or Straight  Infusion Protocol Notify Physician If:    Heparin Infusion and Platelet Monitoring Per Protocol    Intake and Output    Daily weights    Wound care    Prevalon Boot    Heparin: ACS (low) or Straight Infusion Protocol Administration Instructions    Heparin: ASC (Low) or Straight  Infusion Protocol Notify Physician If:    Heparin Infusion and Platelet Monitoring Per Protocol    Level 3 - DNAR (Do Not Attempt Resuscitation) and DNI (Do Not Intubate)    Inpatient consult to Vascular Surgery    Inpatient consult to Podiatry    Inpatient consult to [facility]    EKG RESULTS    ECG 12 lead    ECG 12 lead    Inpatient Admission (expected length of stay for this patient is greater than two midnights)     Labs Reviewed   CBC AND DIFFERENTIAL - Abnormal        Result Value Ref Range Status    WBC 11 54 (*) 4 31 - 10 16 Thousand/uL Final    RBC 3 60 (*) 3 81 - 5 12 Million/uL Final    Hemoglobin 8 7 (*) 11 5 - 15 4 g/dL Final    Hematocrit 29 3 (*) 34 8 - 46 1 % Final    MCV 81 (*) 82 - 98 fL Final    MCH 24 2 (*) 26 8 - 34 3 pg Final MCHC 29 7 (*) 31 4 - 37 4 g/dL Final    RDW 17 8 (*) 11 6 - 15 1 % Final    MPV 9 9  8 9 - 12 7 fL Final    Platelets 484  059 - 390 Thousands/uL Final    nRBC 0  /100 WBCs Final    Neutrophils Relative 80 (*) 43 - 75 % Final    Immat GRANS % 1  0 - 2 % Final    Lymphocytes Relative 9 (*) 14 - 44 % Final    Monocytes Relative 8  4 - 12 % Final    Eosinophils Relative 2  0 - 6 % Final    Basophils Relative 0  0 - 1 % Final    Neutrophils Absolute 9 22 (*) 1 85 - 7 62 Thousands/µL Final    Immature Grans Absolute 0 09  0 00 - 0 20 Thousand/uL Final    Lymphocytes Absolute 1 03  0 60 - 4 47 Thousands/µL Final    Monocytes Absolute 0 96  0 17 - 1 22 Thousand/µL Final    Eosinophils Absolute 0 20  0 00 - 0 61 Thousand/µL Final    Basophils Absolute 0 04  0 00 - 0 10 Thousands/µL Final   BASIC METABOLIC PANEL - Abnormal     Sodium 135 (*) 136 - 145 mmol/L Final    Potassium 5 7 (*) 3 5 - 5 3 mmol/L Final    Chloride 103  100 - 108 mmol/L Final    CO2 25  21 - 32 mmol/L Final    ANION GAP 7  4 - 13 mmol/L Final    BUN 45 (*) 5 - 25 mg/dL Final    Creatinine 1 41 (*) 0 60 - 1 30 mg/dL Final    Comment: Standardized to IDMS reference method    Glucose 98  65 - 140 mg/dL Final    Comment:   If the patient is fasting, the ADA then defines impaired fasting glucose as > 100 mg/dL and diabetes as > or equal to 123 mg/dL  Specimen collection should occur prior to Sulfasalazine administration due to the potential for falsely depressed results  Specimen collection should occur prior to Sulfapyridine administration due to the potential for falsely elevated results  Calcium 9 0  8 3 - 10 1 mg/dL Final    eGFR 35  ml/min/1 73sq m Final    Narrative:     National Kidney Disease Education Program recommendations are as follows:  GFR calculation is accurate only with a steady state creatinine  Chronic Kidney disease less than 60 ml/min/1 73 sq  meters  Kidney failure less than 15 ml/min/1 73 sq  meters     PROTIME-INR - Abnormal Protime 31 5 (*) 11 8 - 14 2 seconds Final    INR 3 04 (*) 0 86 - 1 17 Final   APTT - Abnormal     PTT 57 (*) 24 - 36 seconds Final    Comment: Therapeutic Heparin Range =  60-90 seconds     Time reflects when diagnosis was documented in both MDM as applicable and the Disposition within this note     Time User Action Codes Description Comment    9/7/2018  3:49 PM Exie Punch Add [I73 9] PAD (peripheral artery disease) ([place] [address] )     9/7/2018  3:49 PM Exie Punch Modify [I73 9] PAD (peripheral artery disease) ([place] [address] )     9/7/2018  3:49 PM Exie Punch Modify [I73 9] PAD (peripheral artery disease) ([place] [address] )     9/7/2018  5:28 PM Exie Punch Add [I96] Wet gangrene ([place] [address] )     9/7/2018  5:28 PM Exie Punch Add [N17 9] LEONARD (acute kidney injury) ([place] [address] )     9/7/2018  5:28 PM Exie Punch Modify [I73 9] PAD (peripheral artery disease) ([place] [address] )     9/7/2018  5:28 PM Exie Punch Modify [N17 9] LEONARD (acute kidney injury) ([place] [address] )     9/7/2018  6:34 PM Marysue Render Add [I70 269] 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)       ED Disposition     ED Disposition Condition Comment    Admit  Case was discussed with SOD and the patient's admission status was agreed to be Admission Status: inpatient status to the service of Dr Kristy Barnard   Follow-up Information    None       Current Discharge Medication List      CONTINUE these medications which have NOT CHANGED    Details   acetaminophen (TYLENOL) 650 mg CR tablet Take 1 tablet (650 mg total) by mouth every 8 (eight) hours as needed for mild pain Do not take in conjunction with Percocet    Qty: 90 tablet, Refills: 0    Associated Diagnoses: S/P TAVR (transcatheter aortic valve replacement)      albuterol (VENTOLIN HFA) 90 mcg/act inhaler Inhale 108 mcg 2 (two) times a day as needed 2 puffs bid PRN      amiodarone 200 mg tablet Take 1 tablet (200 mg total) by mouth 2 (two) times a day  Qty: 60 tablet, Refills: 3    Associated Diagnoses: Atrial flutter, unspecified type (HCC)      amLODIPine (NORVASC) 10 mg tablet Take 1 tablet (10 mg total) by mouth daily  Qty: 30 tablet, Refills: 0    Associated Diagnoses: Essential hypertension      atorvastatin (LIPITOR) 80 mg tablet Take 1 tablet (80 mg total) by mouth daily  Qty: 30 tablet, Refills: 2    Associated Diagnoses: Mixed hyperlipidemia      Cholecalciferol (VITAMIN D3) 1000 units CHEW Chew 1 tablet (1,000 Units total) 2 (two) times a day  Qty: 60 tablet, Refills: 5    Associated Diagnoses: Vitamin D deficiency      docusate sodium (COLACE) 100 mg capsule Take 1 capsule (100 mg total) by mouth 2 (two) times a day  Qty: 60 capsule, Refills: 0    Associated Diagnoses: Foot pain      fentaNYL (DURAGESIC) 25 mcg/hr Place 1 patch on the skin every third day for 10 doses Earliest Fill Date: 8/26/18 Max Daily Amount: 1 patch  Qty: 10 patch, Refills: 0    Associated Diagnoses: Foot pain      ferrous sulfate 325 (65 Fe) mg tablet Take 1 tablet (325 mg total) by mouth every other day  Qty: 30 tablet, Refills: 0    Associated Diagnoses: Iron deficiency      fluticasone (FLONASE) 50 mcg/act nasal spray 1 spray into each nostril daily      furosemide (LASIX) 40 mg tablet Take 1 tablet (40 mg total) by mouth daily  Qty: 90 tablet, Refills: 3    Associated Diagnoses: Chronic systolic heart failure (HCC)      gabapentin (NEURONTIN) 100 mg capsule Take 1 capsule (100 mg total) by mouth 3 (three) times a day  Qty: 90 capsule, Refills: 0    Associated Diagnoses:  Foot pain      glucose blood test strip 1 each by Other route 4 (four) times a day (before meals and at bedtime) Use as instructed      Insulin Syringe-Needle U-100 (BD INSULIN SYRINGE ULTRAFINE) 31G X 5/16" 1 ML MISC Inject under the skin 2 (two) times a day  Qty: 200 each, Refills: 3    Associated Diagnoses: Type 2 diabetes mellitus with complication, with long-term current use of insulin ([facility]) LANTUS 100 UNIT/ML subcutaneous injection Inject 20 Units under the skin 2 (two) times a day  Qty: 10 mL, Refills: 0    Associated Diagnoses: Type 2 diabetes mellitus with complication, with long-term current use of insulin ([facility])      levETIRAcetam (KEPPRA) 750 mg tablet Take 1 tablet (750 mg total) by mouth every 12 (twelve) hours  Qty: 60 tablet, Refills: 11    Associated Diagnoses: Seizure ([facility])      levothyroxine 100 mcg tablet Take 1 tablet (100 mcg total) by mouth daily  Qty: 90 tablet, Refills: 1    Associated Diagnoses: Hypothyroidism, unspecified type      lisinopril (ZESTRIL) 10 mg tablet Take 1 tablet (10 mg total) by mouth daily  Qty: 30 tablet, Refills: 0    Associated Diagnoses: Chronic systolic heart failure ([facility])      loratadine (CLARITIN) 10 mg tablet Take 1 tablet (10 mg total) by mouth daily  Qty: 90 tablet, Refills: 1    Associated Diagnoses: URI, acute      magnesium oxide (MAG-OX) 400 mg Take 1 tablet (400 mg total) by mouth 2 (two) times a day  Qty: 180 tablet, Refills: 1    Associated Diagnoses: Hypomagnesemia      metFORMIN (GLUCOPHAGE) 1000 MG tablet 1,000 mg 2 (two) times a day      metoclopramide (REGLAN) 10 mg tablet Take 0 5 tablets (5 mg total) by mouth every 6 (six) hours as needed (Nausea and vomiting)  Qty: 30 tablet, Refills: 1    Associated Diagnoses: Viral gastroenteritis      Mometasone Furoate (ASMANEX HFA) 100 MCG/ACT AERO Inhale 2 puffs once daily  Qty: 1 Inhaler, Refills: 5    Associated Diagnoses:  Moderate asthma without complication, unspecified whether persistent      Nutritional Supplements (UTYMAX PO) Take by mouth      omeprazole (PriLOSEC) 20 mg delayed release capsule Take 20 mg by mouth daily      oxyCODONE (ROXICODONE) 5 mg immediate release tablet Take 5 mg by mouth every 4 (four) hours as needed for moderate pain      polyethylene glycol (MIRALAX) 17 g packet Take 17 g by mouth daily as needed        potassium chloride (K-DUR,KLOR-CON) 10 mEq tablet Take 1 tablet (10 mEq total) by mouth 2 (two) times a day  Qty: 60 tablet, Refills: 2    Associated Diagnoses: Hypokalemia      rivaroxaban (XARELTO) 20 mg tablet Take 1 tablet (20 mg total) by mouth daily  Qty: 90 tablet, Refills: 0    Associated Diagnoses: Paroxysmal atrial fibrillation (HCC)      saccharomyces boulardii (FLORASTOR) 250 mg capsule Take 250 mg by mouth 2 (two) times a day           No discharge procedures on file  Prior to Admission Medications   Prescriptions Last Dose Informant Patient Reported? Taking? Cholecalciferol (VITAMIN D3) 1000 units CHEW   No Yes   Sig: Chew 1 tablet (1,000 Units total) 2 (two) times a day   Insulin Syringe-Needle U-100 (BD INSULIN SYRINGE ULTRAFINE) 31G X 5/16" 1 ML MISC  Family Member No Yes   Sig: Inject under the skin 2 (two) times a day   LANTUS 100 UNIT/ML subcutaneous injection  Family Member No Yes   Sig: Inject 20 Units under the skin 2 (two) times a day   Patient taking differently: Inject 18 Units under the skin 2 (two) times a day     Mometasone Furoate (ASMANEX HFA) 100 MCG/ACT AERO  Family Member No Yes   Sig: Inhale 2 puffs once daily   Nutritional Supplements (UTYMAX PO)   Yes Yes   Sig: Take by mouth   acetaminophen (TYLENOL) 650 mg CR tablet  Family Member No Yes   Sig: Take 1 tablet (650 mg total) by mouth every 8 (eight) hours as needed for mild pain Do not take in conjunction with Percocet     albuterol (VENTOLIN HFA) 90 mcg/act inhaler  Family Member Yes Yes   Sig: Inhale 108 mcg 2 (two) times a day as needed 2 puffs bid PRN   amLODIPine (NORVASC) 10 mg tablet   No Yes   Sig: Take 1 tablet (10 mg total) by mouth daily   amiodarone 200 mg tablet  Family Member No Yes   Sig: Take 1 tablet (200 mg total) by mouth 2 (two) times a day   atorvastatin (LIPITOR) 80 mg tablet  Family Member No Yes   Sig: Take 1 tablet (80 mg total) by mouth daily   docusate sodium (COLACE) 100 mg capsule   No Yes   Sig: Take 1 capsule (100 mg total) by mouth 2 (two) times a day fentaNYL (DURAGESIC) 25 mcg/hr   No Yes   Sig: Place 1 patch on the skin every third day for 10 doses Earliest Fill Date: 18 Max Daily Amount: 1 patch   ferrous sulfate 325 (65 Fe) mg tablet   No Yes   Sig: Take 1 tablet (325 mg total) by mouth every other day   fluticasone (FLONASE) 50 mcg/act nasal spray  Family Member Yes Yes   Si spray into each nostril daily   furosemide (LASIX) 40 mg tablet  Family Member No Yes   Sig: Take 1 tablet (40 mg total) by mouth daily   gabapentin (NEURONTIN) 100 mg capsule   No Yes   Sig: Take 1 capsule (100 mg total) by mouth 3 (three) times a day   Patient taking differently: Take 300 mg by mouth 3 (three) times a day     glucose blood test strip  Family Member Yes Yes   Si each by Other route 4 (four) times a day (before meals and at bedtime) Use as instructed   levETIRAcetam (KEPPRA) 750 mg tablet  Family Member No Yes   Sig: Take 1 tablet (750 mg total) by mouth every 12 (twelve) hours   Patient taking differently: Take 750 mg by mouth daily     levothyroxine 100 mcg tablet  Family Member No Yes   Sig: Take 1 tablet (100 mcg total) by mouth daily   lisinopril (ZESTRIL) 10 mg tablet   No Yes   Sig: Take 1 tablet (10 mg total) by mouth daily   loratadine (CLARITIN) 10 mg tablet  Family Member No Yes   Sig: Take 1 tablet (10 mg total) by mouth daily   magnesium oxide (MAG-OX) 400 mg  Family Member No Yes   Sig: Take 1 tablet (400 mg total) by mouth 2 (two) times a day   metFORMIN (GLUCOPHAGE) 1000 MG tablet  Family Member Yes Yes   Si,000 mg 2 (two) times a day   metoclopramide (REGLAN) 10 mg tablet  Family Member No Yes   Sig: Take 0 5 tablets (5 mg total) by mouth every 6 (six) hours as needed (Nausea and vomiting)   omeprazole (PriLOSEC) 20 mg delayed release capsule   Yes Yes   Sig: Take 20 mg by mouth daily   oxyCODONE (ROXICODONE) 5 mg immediate release tablet   Yes Yes   Sig: Take 5 mg by mouth every 4 (four) hours as needed for moderate pain polyethylene glycol (MIRALAX) 17 g packet  Family Member Yes Yes   Sig: Take 17 g by mouth daily as needed     potassium chloride (K-DUR,KLOR-CON) 10 mEq tablet  Family Member No Yes   Sig: Take 1 tablet (10 mEq total) by mouth 2 (two) times a day   rivaroxaban (XARELTO) 20 mg tablet  Family Member No Yes   Sig: Take 1 tablet (20 mg total) by mouth daily   saccharomyces boulardii (FLORASTOR) 250 mg capsule   Yes Yes   Sig: Take 250 mg by mouth 2 (two) times a day      Facility-Administered Medications: None       Portions of the record may have been created with voice recognition software  Occasional wrong word or "sound a like" substitutions may have occurred due to the inherent limitations of voice recognition software  Read the chart carefully and recognize, using context, where substitutions have occurred      Electronically signed by:  Clint Hurley

## 2018-09-07 NOTE — PROGRESS NOTES
[facility] Admission Note   Unit/Bed # @DBLINK (UKN,22848)@ Encounter: [id number]  SOD Team C     Nam Love   [de-identified] y o  female   MRN: [id number]     History of Present Illness:  [de-identified]year old female with extensive past medical history presents to [facility] ED with discoloration of left great toe and foot pain  Patient was recently hospitalized for the same issue  At that time, patient underwent workup with eventual recommendations for left BKA  However, patient requested a second opinion and per that evaluation, consideration for possible attempt at bypass  Patient was discharged to [address] [place] [address] with outpatient follow-up with vascular surgery with plans for eventual surgery  She denies any fevers, chills, chest pain, shortness of breath, abdominal pain, constipation, diarrhea  Her primary complaint is bilateral foot pain and discoloration of the left great toe  Patient reports some possible drainage from chronic ulcerations  Vascular surgery evaluated the patient in the ED and recommended podiatry consultation and x-ray to rule out osteomyelitis and given concerns of progression of dry to wet gangrene and infection  During previous hospitalization, wound cultures of the foot ulcers grew staph aureus, klebsiella oxytoca, and citrobacter  A separate culture grew only staph aureus  Patient was treated with 7-day course of Ancef during that hospitalization  ID at that time recommended antibiotics to be discontinued as it was felt that the erythema and rubor was secondary to vasculopathy rather than an infectious cause  Current workup showed a mildly elevated WBC count as well as an LEONARD with serum creatinine of 1 4 (up from 0 8 previously)  Patient also demonstrated an elevated INR of 3  Cause of this was unclear  Patient was on Xarelto for history of DVT/PE as well as history of paroxysmal atrial fibrillation  VS were stable and patient was afebrile  Reviewed H&P per Dr Gunter Core   Agree with the assessment and plan except as noted below  Assessment/Plan:   Principal Problem: 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 [facility])  Active Problems:    Essential hypertension    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 native heart without angina pectoris    Type 2 diabetes mellitus with complication, with long-term current use of insulin (HCC)    Hyperlipidemia    Gastroesophageal reflux disease without esophagitis    Ulcer of toe of left foot (HCC)    Anemia    Elevated INR    Hypothyroidism     1  Severe peripheral vascular disease with gangrene and ulceration of bilateral feet - involving both feet but significant discoloration and pain involving the left great toe  Angio from 8/21/2018 showed severe multivessel disease in the left calf, patent left superficial femoral and popliteal arteries with diffuse nonflow limiting disease  · Vascular surgery and podiatry consultation  · Check X-ray left foot to rule out osteomyelitis  · NPO after midnight for possible surgical intervention per podiatry  · Will place on empiric antibiotic therapy with IV Ancef based on previous wound culture results which grew staph aureus (although Klebsiella was intermediate, it only grew in 1 of 2 cultures so low suspicion that this was causative organism)  · Further workup and intervention per consultant teams    2  History of DVT/PE - LE duplex on 8/24/2018 showed non-occlusive thrombus in 1 of the paired peroneal veins in proximal and distal calf on left side  No thrombus in right limb  On Xarelto chronically for anticoagulation  · Will hold Xarelto at this time  · Heparin gtt in interim for anticoagulation  · No SCDs in setting of chronic lower extremity wounds and vasculopathy  · Continue to monitor    3  CAD status post CABG - stable  · Continue atorvastatin  · Continue to monitor  · PRN IV Labetalol with parameters for additional BP control; would avoid hydralazine for BP control in setting of CAD    4  Paroxysmal atrial fibrillation - currently in NSR  On Xarelto for anticoagulation  · Will hold Xarelto for now  · Heparin gtt for anticoagulation in interim  · Continue amiodarone 200 mg BID for rate control  · Continue to monitor    5  Chronic systolic congestive heart failure - without acute exacerbation  Clinically slightly hypovolemic, likely contributing to LEONARD  · Will hold Lasix and lisinopril in setting of LEONARD  · Gentle IV fluid hydration with NS at 75 mL/hr  · Monitor intake/output, volume status  · Recheck renal function and if normalized, would resume Lasix and lisinopril and also discontinue IV fluid hydration if patient is adequately eating/drinking  · Monitor respiratory status    6  Elevated INR - INR 3  Unclear cause  Xarelto would not be expected to cause elevation of INR  Previous INR was normal while on Xarelto as well  No evidence of cirrhosis  Platelet count stable  · Repeat PT/INR in AM and trend  · Will start heparin gtt in interim for anticoagulation  · Check CMP as well to assess hepatic synthetic function although normal platelet count gives low suspicion for cirrhosis  · If urgent surgical intervention is needed, consider FFP to help lower INR    7  Hypertension - BP stable  Most recent 141/86  · Hold Lasix and lisinopril in setting of LEONARD  · Continue amlodipine  · IV Labetalol 5 mg every 6 hours with parameters; would be judicious with AV gudelia blocking agents given HR in 50-60's  · Continue to monitor blood pressures  · Avoid hypotension in setting of LEONARD  · Goal is BP <150/90 as tolerated    8  Acute kidney injury - serum creatinine 1 4 (previously 0 8)  Clinically, slightly hypovolemic  Suspect pre-renal in setting of vasculopathy and suspected infection of distal lower extremities    · Hold lasix and lisinopril  · Avoid unnecessary nephrotoxic agents  · Gentle IV fluid hydration with NS at 75 mL/hr  · Close monitoring of intake/output, volume status in setting of systolic CHF  · Repeat renal function labs in AM and restart lasix and lisinopril if normalized  · Stop IV fluids once patient is adequately tolerating oral intake    9  Hypothyroidism - stable  TSH in June 2018 was 1 52   · Continue levothyroxine    10  History of seizures - stable  · Continue Keppra    11  Type 2 diabetes mellitus - A1c in April 2018 was 7 7%  · Continue Lantus  · CSI algorithm 2 and continue to monitor BS  · Avoid hypoglycemia  · Strict glycemic control in setting of severe PVD and possible infection      Code Status: Level 3 DNR/DNI  Diet: Carb constant  VTE Prophylaxis: Heparin gtt    Disposition: INPATIENT   Expected LOS: >2 Midnights    ==  RUTH Rich O    [address]  Internal Medicine Resident PGY-2

## 2018-09-07 NOTE — H&P
INTERNAL MEDICINE HISTORY AND PHYSICAL  -01 SOD-C    NAME: Jonas Reed  AGE: [de-identified] y o  SEX: female  : 1940   MRN: [id number]  ENCOUNTER: [id number]    DATE: 2018  TIME: 7:18 PM    Primary Care Physician: Latricia Mendoza MD  Admitting Provider: Latricia Mendoza MD     Chief complaint: Toe swelling with blue discoloration    History of Present Illness     Jonas Reed is a [de-identified] y o  female with a past medical history significant for 3 myocardial infarctions s/p 6 vessel bypass, multiple CVAs, pAF, hyperlipidemia, peripheral artery disease, short-term memory loss, and chronic anemia who presents with swelling of her left first toe  Of note, the patient was recently admitted on August for management of bilateral toe wounds and left foot cellulitis  At the time, the patient was treated with a 7 day course of Ancef for cellulitis/right foot osteomyelitis  She received a LLE angiogram showing multi-vessel disease with poor runoff  Vascular surgery was following, and offered the patient a left BKA  The patient declined and instead a plan was made to have a bypass procedure performed on 2018  She was discharged to short-term rehab at the end of August  Since then, the patient states that she has noticed blue discoloration of her left toe starting yesterday  She noticed drainage into her left shoe yesterday as well, which by the time it dried was tan in color  She noted that it has been leaking a similar fluid for at least weeks but could not state exactly for how long  She did not notice any change in pain when the toe turned blue  No other digits were noted to have discoloration  She has not been able to walk around at her home facility at [facility] without using a walker, which she did not need often prior to the past several days  She also has had left shoulder pain the past week, likely unrelated  The patient denied any recent trauma to the feet    Associated symptoms included nausea and a low-grade fever  She was told she had this fever by [facility] staff and exact temperature was not noted  On present patient's the emergency department, the patient had a temperature of 98 2° F, with pulse of 61, respiratory rate of 18, blood pressure 130/57, saturating 91% on room air  She had not borderline elevated white blood cell count 11 54, and was anemic with a hemoglobin of 8 7  Potassium was elevated at 5 7, and creatinine was elevated at 1 41 with a baseline of about 0 8  When seen by the admitting team, she denied chest pain, shortness of breath  She felt nauseated over the past week, which resolved today, and she has not vomited  She does not know if she had any sick contacts but notes that she noticed people who were coughing at [facility]  No recent fevers noted  Review of Systems   Review of Systems   Constitutional: Negative  HENT: Negative  Eyes: Negative  Respiratory: Negative  Cardiovascular: Positive for leg swelling  Gastrointestinal: Negative  Endocrine: Negative  Genitourinary: Negative  Musculoskeletal: Positive for arthralgias (left shoulder)  Allergic/Immunologic: Negative  Neurological: Negative  Hematological: Negative  Psychiatric/Behavioral: Negative          Past Medical History     Past Medical History:   Diagnosis Date    Altered mental status     Anemia     Anticoagulated     Xarelto for Afib/ flutter    Asthma     Atrial flutter (HCC)     maintained on anticoag w/ Xarelto    Bradycardia     CAD (coronary artery disease)     Candidiasis     Carotid stenosis, bilateral     Cataract     Chest pain     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) (HCC)     Chronic fatigue syndrome     Chronic low back pain     Chronic ulcer of toes (HCC)     left and right    Concussion w loss of consciousness of unsp duration, init     CVA [facility]cerebral vascular accident) ([facility] [address] ) 4/17/2018    [facility]     type 2, insulin dependent    DJD (degenerative joint disease)     Expressive aphasia     Foot pain     GERD (gastroesophageal reflux disease)     Herpes zoster     HLD (hyperlipidemia)     HTN (hypertension)     Hypothyroidism     Irritable bowel syndrome     Kidney stone     Lumbar radiculopathy     Lyme disease     Dx in hospital 8/2011    MI (myocardial infarction) ([facility] [address] )     Migraines     Muscle spasm     Non-neoplastic nevus     Nontoxic multinodular goiter     NSVT (nonsustained ventricular tachycardia) (HCC)     Osteoarthritis     Other chronic pain     PAD (peripheral artery disease) (HCC)     Palpitations     Paroxysmal atrial fibrillation (HCC)     Pseudogout     Pulmonary hypertension (HCC)     S/P TAVR (transcatheter aortic valve replacement)     Seizures (HCC)     Severe sepsis (HCC)     Spinal stenosis     Stroke ([facility] [address] ) 05/2018    Transient cerebral ischemia     Trigger ring finger     Viral gastroenteritis        Past Surgical History     Past Surgical History:   Procedure Laterality Date    APPENDECTOMY      ARTERIOGRAM  12/19/2017    CARDIAC CATHETERIZATION      CHOLECYSTECTOMY      COLONOSCOPY      CORONARY ARTERY BYPASS GRAFT  2004    IR ABDOMINAL ANGIOGRAPHY / INTERVENTION  8/10/2018    IR ABDOMINAL ANGIOGRAPHY / INTERVENTION  8/21/2018    LUMBAR LAMINECTOMY      IL ECHO TRANSESOPHAG R-T 2D W/PRB IMG ACQUISJ I&R N/A 4/3/2018    Procedure: TRANSESOPHAGEAL ECHOCARDIOGRAM (ROBB);   Surgeon: Chris Michaels DO;  Location: [facility];  Service: Cardiac Surgery    IL REPLACE AORTIC VALVE OPENFEMORAL ARTERY APPROACH N/A 4/3/2018    Procedure: REPLACEMENT AORTIC VALVE TRANSCATHETER (TAVR) TRANSFEMORAL w/ 26MM IVY YEVGENIY S3 VALVE;  Surgeon: Chris Michaels DO;  [facility];  Service: Cardiac Surgery    THYROIDECTOMY      TOTAL ABDOMINAL HYSTERECTOMY W/ BILATERAL SALPINGOOPHORECTOMY         Social History     History   Alcohol Use No     History Drug Use No     History   Smoking Status    Never Smoker   Smokeless Tobacco    Never Used       Family History     Family History   Problem Relation Age of Onset    Cancer Mother     Stroke Mother     Cancer Father     Heart disease Father     Breast cancer Sister     Diabetes Daughter         Passed away January 2017        Medications Prior to Admission     Prior to Admission medications    Medication Sig Start Date End Date Taking? Authorizing Provider   acetaminophen (TYLENOL) 650 mg CR tablet Take 1 tablet (650 mg total) by mouth every 8 (eight) hours as needed for mild pain Do not take in conjunction with Percocet   4/6/18  Yes Amando Hernandez PA-C   albuterol (VENTOLIN HFA) 90 mcg/act inhaler Inhale 108 mcg 2 (two) times a day as needed 2 puffs bid PRN   Yes Historical Provider, MD   amiodarone 200 mg tablet Take 1 tablet (200 mg total) by mouth 2 (two) times a day 6/19/18  Yes Sima Perez MD   amLODIPine (NORVASC) 10 mg tablet Take 1 tablet (10 mg total) by mouth daily 8/24/18  Yes Dari Prince DO   atorvastatin (LIPITOR) 80 mg tablet Take 1 tablet (80 mg total) by mouth daily 4/26/18  Yes Juan Lara MD   Cholecalciferol (VITAMIN D3) 1000 units CHEW Chew 1 tablet (1,000 Units total) 2 (two) times a day 8/6/18  Yes Ezra Robertson PA-C   docusate sodium (COLACE) 100 mg capsule Take 1 capsule (100 mg total) by mouth 2 (two) times a day 8/24/18  Yes Dari Prince DO   fentaNYL (DURAGESIC) 25 mcg/hr Place 1 patch on the skin every third day for 10 doses Earliest Fill Date: 8/26/18 Max Daily Amount: 1 patch 8/26/18 9/23/18 Yes Dari Prince DO   ferrous sulfate 325 (65 Fe) mg tablet Take 1 tablet (325 mg total) by mouth every other day 8/26/18  Yes Dari Prince DO   fluticasone (FLONASE) 50 mcg/act nasal spray 1 spray into each nostril daily   Yes Historical Provider, MD   furosemide (LASIX) 40 mg tablet Take 1 tablet (40 mg total) by mouth daily 5/30/18  Yes Berenice Harshal Ball MD   gabapentin (NEURONTIN) 100 mg capsule Take 1 capsule (100 mg total) by mouth 3 (three) times a day  Patient taking differently: Take 300 mg by mouth 3 (three) times a day   8/24/18  Yes Rhea Flores, DO   glucose blood test strip 1 each by Other route 4 (four) times a day (before meals and at bedtime) Use as instructed   Yes Historical Provider, MD   Insulin Syringe-Needle U-100 (BD INSULIN SYRINGE ULTRAFINE) 31G X 5/16" 1 ML MISC Inject under the skin 2 (two) times a day 6/27/18  Yes Keenan Boeck M Hoffman, CRNP   LANTUS 100 UNIT/ML subcutaneous injection Inject 20 Units under the skin 2 (two) times a day  Patient taking differently: Inject 18 Units under the skin 2 (two) times a day   4/19/18  Yes Micpaz Citisadia   levETIRAcetam (KEPPRA) 750 mg tablet Take 1 tablet (750 mg total) by mouth every 12 (twelve) hours  Patient taking differently: Take 750 mg by mouth daily   6/13/18  Yes Jarek Fuller MD   levothyroxine 100 mcg tablet Take 1 tablet (100 mcg total) by mouth daily 6/4/18  Yes RAMO Ling   lisinopril (ZESTRIL) 10 mg tablet Take 1 tablet (10 mg total) by mouth daily 8/24/18  Yes Rhea Flores DO   loratadine (CLARITIN) 10 mg tablet Take 1 tablet (10 mg total) by mouth daily 6/4/18  Yes RAMO Ling   magnesium oxide (MAG-OX) 400 mg Take 1 tablet (400 mg total) by mouth 2 (two) times a day 6/15/18  Yes RAMO Ling   metFORMIN (GLUCOPHAGE) 1000 MG tablet 1,000 mg 2 (two) times a day   Yes Historical Provider, MD   metoclopramide (REGLAN) 10 mg tablet Take 0 5 tablets (5 mg total) by mouth every 6 (six) hours as needed (Nausea and vomiting) 5/29/18  Yes Lina Ellington,    Mometasone Furoate (ASMANEX HFA) 100 MCG/ACT AERO Inhale 2 puffs once daily 3/12/18  Yes Claudetta Harry, MD   Nutritional Supplements (UTYMAX PO) Take by mouth   Yes Historical Provider, MD   omeprazole (PriLOSEC) 20 mg delayed release capsule Take 20 mg by mouth daily   Yes Historical Provider, MD   oxyCODONE (ROXICODONE) 5 mg immediate release tablet Take 5 mg by mouth every 4 (four) hours as needed for moderate pain   Yes Historical Provider, MD   polyethylene glycol (MIRALAX) 17 g packet Take 17 g by mouth daily as needed     Yes Historical Provider, MD   potassium chloride (K-DUR,KLOR-CON) 10 mEq tablet Take 1 tablet (10 mEq total) by mouth 2 (two) times a day 7/19/18  Yes RAMO Ling   rivaroxaban (XARELTO) 20 mg tablet Take 1 tablet (20 mg total) by mouth daily 6/4/18  Yes RAMO Ling   saccharomyces boulardii (FLORASTOR) 250 mg capsule Take 250 mg by mouth 2 (two) times a day 9/5/18 9/25/18 Yes Historical Provider, MD   acetaminophen (TYLENOL) 325 mg tablet Take 3 tablets (975 mg total) by mouth every 8 (eight) hours 8/24/18 9/7/18  Cayetano Stewart DO   aspirin 81 MG tablet Take 81 mg by mouth daily  9/7/18  Historical Provider, MD   calcium carbonate (TUMS) 500 mg chewable tablet Chew 2 tablets 3 (three) times a day    9/7/18  Historical Provider, MD   metoprolol succinate (TOPROL-XL) 25 mg 24 hr tablet Take 0 5 tablets (12 5 mg total) by mouth daily at bedtime 8/24/18 9/7/18  Cayetano Stewart DO   nitroglycerin (NITROSTAT) 0 4 mg SL tablet Place 0 4 mg under the tongue every 3 (three) minutes as needed 5/11/15 9/7/18  Historical Provider, MD   pantoprazole (PROTONIX) 40 mg tablet Take 40 mg by mouth daily  9/7/18  Historical Provider, MD   phenazopyridine (PYRIDIUM) 200 mg tablet Take 1 tablet (200 mg total) by mouth 3 (three) times a day with meals 6/12/18 9/7/18  RAMO Jay       Allergies     Allergies   Allergen Reactions    Other Chest Pain     IVP-listed as "chest pain" in previous chart, patient stated this occurred "a long time ago" but does not remember exactly occurred     Contrast [Iodinated Diagnostic Agents] Other (See Comments)     Flash pulm edema    Doxycycline Chest Pain    Ketorolac Other (See Comments)     Chest pain     Levaquin [Levofloxacin] Chest Pain    Ondansetron Chest Pain     Prolonged QT    Toradol [Ketorolac Tromethamine] Chest Pain       Objective     Vitals:    09/07/18 1242 09/07/18 1415 09/07/18 1430 09/07/18 1838   BP: 130/57  133/56 141/86   BP Location: Left arm   Right arm   Pulse: 61 62 62 58   Resp: 18      Temp: 98 2 °F (36 8 °C)   98 6 °F (37 °C)   TempSrc: Oral   Oral   SpO2: 91% 94% 92% 91%   Weight:    69 9 kg (154 lb)   Height:    5' 4" (1 626 m)     Body mass index is 26 43 kg/m²  Intake/Output Summary (Last 24 hours) at 09/07/18 1918  Last data filed at 09/07/18 1649   Gross per 24 hour   Intake              500 ml   Output                0 ml   Net              500 ml     Invasive Devices     Peripheral Intravenous Line            Peripheral IV 09/07/18 Right Antecubital less than 1 day                Physical Exam  Physical Exam   Constitutional: She is oriented to person, place, and time  She appears well-developed and well-nourished  HENT:   Head: Normocephalic  Eyes: No scleral icterus  Neck: Normal range of motion  Cardiovascular: Normal rate  4/6 systolic murmur   Pulmonary/Chest: Effort normal    Coarse crackles on right middle and lower lung fields   Abdominal: Soft  Bowel sounds are normal    Musculoskeletal: She exhibits edema (+1 pitting edema of the lower extremities bilaterally)  Left UE shoulder flexion limited to 90 degrees, pain on abducting shoulder against resistance   Neurological: She is alert and oriented to person, place, and time  No cranial nerve deficit  Skin:   Blue discoloration of left toe  Patient still had sensation to left first toe, as well as reduced sensation in all other toes  Sensation to light touch on dorsal and ventral foot bilaterally and legs  Healed ulcer present 2nd left toe  Previously opened ulcer base 1st left toe, no significant drainage  Erythema on dorsal feet bilaterally   Interdigital wound between 1st and 2nd toe left foot, macerated        Lab Results: I have personally reviewed pertinent reports  CBC:     Results from last 7 days  Lab Units 09/07/18  1503   WBC Thousand/uL 11 54*   RBC Million/uL 3 60*   HEMOGLOBIN g/dL 8 7*   HEMATOCRIT % 29 3*   MCV fL 81*   MCH pg 24 2*   MCHC g/dL 29 7*   RDW % 17 8*   MPV fL 9 9   PLATELETS Thousands/uL 340   NRBC AUTO /100 WBCs 0   NEUTROS PCT % 80*   LYMPHS PCT % 9*   MONOS PCT % 8   EOS PCT % 2   BASOS PCT % 0   NEUTROS ABS Thousands/µL 9 22*   LYMPHS ABS Thousands/µL 1 03   MONOS ABS Thousand/µL 0 96   EOS ABS Thousand/µL 0 20   , Chemistry Profile:     Results from last 7 days  Lab Units 09/07/18  1503   SODIUM mmol/L 135*   POTASSIUM mmol/L 5 7*   CHLORIDE mmol/L 103   CO2 mmol/L 25   BUN mg/dL 45*   CREATININE mg/dL 1 41*   CALCIUM mg/dL 9 0   EGFR ml/min/1 73sq m 35       Imaging: I have personally reviewed pertinent films in PACS  Xr Shoulder 2+ Views Left    Result Date: 9/7/2018  Narrative: LEFT SHOULDER INDICATION:   shoulder pain x2 wks  COMPARISON:  None VIEWS:  XR SHOULDER 2+ VW LEFT FINDINGS: There is no acute fracture or dislocation  Joint spaces appear maintained  However, there is ossific density which projects between the humeral head and the acromion which seems to be a relatively large findings and is of uncertain etiology  It measures 7 x 18 mm  It might be a large bone spur  growing downward from the acromion process although other etiologies such as an intra-articular loose body cannot be excluded  Carmella Alvarado the former  This could be impinging upon rotator cuff structures  Probably warrants further workup with MRI  No lytic or blastic lesions are seen  Soft tissues are unremarkable  Impression: Relatively prominent ossific density projecting between acromion and humeral head felt to most likely be a large bone spur  This might be impinging on rotator cuff    Suggest further workup with MRI Workstation performed: [id number]     Xr Foot 3+ Vw Left    Result Date: 8/13/2018  Narrative: LEFT FOOT INDICATION:   Right 3rd PP exposed, R medial wound at the level of 1 met head  COMPARISON:  None VIEWS:  XR FOOT 3+ VW LEFT Images: 3 FINDINGS: There is no acute fracture or dislocation  Calcaneal spur(s) noted  No periosteal reaction or cortical destruction to suggest osteomyelitis  Mild dorsal soft tissue swelling noted  Impression: No acute osseous abnormality  Workstation performed: [id number]     Xr Foot 3+ Vw Right    Result Date: 8/13/2018  Narrative: RIGHT FOOT INDICATION:   Left medial 1st met head wound  COMPARISON:  None VIEWS:  XR FOOT 3+ VW RIGHT FINDINGS: There is no acute fracture or dislocation  Moderate hallux valgus  Degenerative changes of the tarsometatarsal joints and midfoot with a moderate size calcaneal spur also noted  Subtle cortical destruction of the distal 3rd phalanx noted concerning for osteomyelitis  Focal soft tissue wound with mild subcutaneous emphysema medial to the 1st metatarsal head  Impression: Osteomyelitis distal 3rd phalanx  Focal soft tissue wound medial to the metatarsal head with no underlying osteomyelitis  The study was marked in [facility] for immediate notification  Workstation performed: [id number]     Marito Batista & Waveform Analysis, Multiple Levels    Result Date: 8/16/2018  Narrative:  [facility] REPORT CLINICAL: Indications:  Unspecified atherosclerosis [I70 90]  PT S/P right SFA and mid peroneal artery angioplasty for non-healing ulcer/gangrene of right LE digits  Follow-up evaluation  Operative History: 2018-08-10 Right SFA and mid peroneal artery angioplasty CABG Lower extremity a-gram 12/2017 Valve replacement April 2018? ???? Risk Factors The patient has history of Obesity, HTN, Diabetes, AFIB CHF, CKD III, PVD, and CAD  PT is a non-smoker  Clinical Brachial BP:  Right:  IV site  Left:  148/ mm Hg  FINDINGS:  Segment       Rig            Left                          P  W           P  W         Ant   Tibial 70  Biphasic  Peroneal      110  Biphasic                 Post  Tibial                 150  Biphasic  Ankle         110            150               CONCLUSION: Impression RIGHT LOWER LIMB Ankle/Brachial Index is in the moderate claudication range, 0 74  Prior: Unreliable  PPG/PVR Tracings are biphasic at the distal peroneal artery  Unable to obtain a distal posterior tibial and distal anterior tibial Doppler signal  Unable to compare to the study of 07/05/2018 secondary to prior unreliable SHADI  LEFT LOWER LIMB Ankle/Brachial Index is normal, 1 01  Prior: 0 89  PPG/PVR Tracings are biphasic/monophasic  In comparison to the study of 07/05/2018, there is improvement in the SHADI  TECH NOTE: B/L Metatarsal artery and Great Toe pressures were not obtained due to non-working [employer]  SIGNATURE: Electronically Signed by: Carley Whitmore MD on 2018-08-16 03:05:58 PM    Vas Lower Limb Vein Mapping Bypass Graft    Result Date: 8/17/2018  Narrative:  [facility] REPORT CLINICAL: Indications: Pre-op Vein Mapping for Future Lower Extremity Bypass Graft Operative History: 2018-08-10 Right SFA and mid peroneal artery angioplasty CABG lower extremity a-gram 12/2017 Valve replacement April 2018? ???? Risk Factors The patient has history of Diabetes (Yes), HLD and CAD  The patient current BMI is 27 63, Weight is 161 lb and height is 64 in  FINDINGS:  Segment         Right   Left                            AP(mm)  AP(mm)  GSV Inguinal       9 7     7 9  GSV Prox Thigh     5 8          GSV Mid Thigh      4 1     1 8  GSV Dist Thigh     3 4          GSV Knee           3 3          GSV Prox Calf      3 0     3 5  GSV Mid Calf       2 5     4 1  GSV Dist Calf      2 3     2 2  GSV Ankle          2 9     2 3     CONCLUSION:  Impression: RIGHT LOWER LIMB: The great saphenous vein is patent  from the groin to the ankle   The vein is of adequate caliber and quality for graft conduit with intraluminal diameters ranging from 9 7 mm to 2 3 mm throughout the leg  LEFT LOWER LIMB: The great saphenous vein was harvest for bypass graft in the thigh  The vein is of adequate caliber and quality for graft conduit below the knee with intraluminal diameters ranging from 3 5 mm to 2 3 mm throughout the leg  SIGNATURE: Electronically Signed by: Liss Whitt on 2018-08-17 04:42:13 PM    Ir Abdominal Angiography / Intervention    Result Date: 8/21/2018  Narrative: Abdominal aortogram with left lower extremity arteriography History: Severe claudication and lower extremity pain with bilateral tissue loss/gangrene  Known left below knee disease with recent right leg intervention (left groin access)  Contrast: 80 mL Visipaque 300 Fluoro time: 8 6 minutes Number of Images: 165 Conscious sedation time: 1 hour, 40 minutes Technique: The patient was brought to the interventional radiology holding area and identified verbally and by wristband  After discussion of the procedure and its details, risks, benefits, and alternatives both verbal and written informed consent were obtained  The patient was then brought to the angiography suite, placed supine on the table, and the right groin was prepped and draped in the usual sterile fashion  All elements of maximal sterile barrier technique were followed (cap, mask, sterile gown, sterile gloves, large sterile sheet, hand hygiene and 2% chlorhexidine for cutaneous antisepsis)  Sterile ultrasound technique with sterile gel and sterile probe covers was also utilized  A preprocedure timeout was performed  Fluoroscopic imaging was used to identify the femoral head  Under ultrasound, the right common femoral artery was identified  Under ultrasound guidance, lidocaine was administered to the skin and underlying soft tissues  Ultrasound was utilized due to the patient's body habitus  A small skin incision was made    Under continuous ultrasound guidance, the right common femoral artery was punctured in retrograde fashion with return of pulsatile red blood  A microwire was advanced through the needle into the abdominal aorta under fluoroscopy  The microneedle was exchanged for micropuncture sheath, and the wire and inner dilator were removed  Next, a [employer] wire was advanced into the abdominal aorta over which a 5 [employer] vascular sheath was inserted and connected to a flush bag  A 4 [place] Omni Flush catheter was advanced to the level of T12 and a digital subtraction abdominal aortogram was performed  This was then pulled down to just above the iliac bifurcation and pelvic arteriography was performed  This was exchanged for a RIM catheter which was used to access the left common iliac artery  The catheter was then exchanged for an angled glide catheter over the heavy wire into the proximal left common femoral artery and left lower extremity arteriography was performed to the foot  Additional focused ultrasound images were obtained of the left common femoral artery using grayscale and color Doppler flow  Intraprocedural consultation was then obtained with Dr Monsivais and Dr Lucia Grimaldo  Decision was made not to intervene at this time due to the risk of further distal embolization  Dr Joey House assisted with the right groin closure, using a Proglide  Hemostasis was achieved  Puncture site was then cleaned and sterilely dressed  The patient tolerated the procedure well and there were no immediate complications  Findings: 1  Patent abdominal aorta  Patent single bilateral renal arteries  Patent superior mesenteric artery  Patent inferior mesenteric artery  2  Patent and tortuous bilateral common iliac arteries, internal iliac arteries, and external iliac arteries 3  Focal filling defect in the left common femoral artery, eccentric  This was readily identified on ultrasound where it was a hypoechoic ovoid filling defect extending into the lumen, measuring 5-6 mm in maximum dimension    The filling defect was adjacent to the prior arterial access tract  4  Left lower extremity runoff with patent superficial femoral artery with eccentric multifocal diffuse mild disease  The profunda femoral artery is well-developed  The popliteal artery has multifocal mild to moderate stenosis  More distally, there is  severe three-vessel disease in the calf  The anterior tibial artery occludes just past its origin  The peroneal is patent for several centimeters, then inferiorly of the foot is dependent on collaterals  The posterior tibial is not visualized  Although there is distal collateralization, there are decreased number of vessels supplying the foot when compared to prior angiogram of December 19, 2017  Impression: Impression: 1  Severe multivessel disease in the left calf, not amenable to endovascular intervention  Patent left superficial femoral and popliteal arteries with diffuse nonflow limiting disease  2  Focal filling defect in the left common femoral artery possibly related to prior access and closure  Plan: No intervention at this time, return to wound care  Follow-up imaging of the left common femoral artery  Workstation performed: [id number]     Ir Abdominal Angiography / Intervention    Result Date: 8/10/2018  Narrative: EXAMINATION: Right lower extremity angiogram with angioplasty INDICATION: 78-year-old female with peripheral arterial disease, dry gangrene of bilateral toes presented with left foot edema and erythema  Left lower extremity angiogram performed on 12 6/19/2017 showed severe three-vessel tibial disease  Patient returns for right lower extremity angiogram with possible intervention  CONTRAST: 42 mL Visipaque 320 intra-arterial FLUOROSCOPY TIME:   40 minutes IMAGES:  156 ANESTHESIA: Moderate sedation and local anesthetic PROCEDURE:  The patient was identified verbally and by wristband  Timeout was performed  Informed consent was obtained   Following obtaining informed consent, the patient was prepped and draped in the usual sterile fashion  All elements of maximal sterile barrier technique, cap and mask and sterile gown and sterile gloves and sterile full-body drape and hand hygiene and 2% chlorhexidine for cutaneous antisepsis  The left common femoral artery was punctured using a 19-gauge needle and single wall technique  Access was secured using a 5-Thai sheath  A 5-Thai Contra 2 catheter was advanced into the abdominal aorta, led by a ASSIA guidewire  The Contra 2 catheter was advanced across the aortic bifurcation and into the right common femoral artery, led by a Glidewire Advantage wire  Runoff angiogram of the right lower extremity was performed  The 5-Thai sheath was exchanged for a 5-Thai 90 cm Raabe sheath  A 4-Thai angled Glide catheter followed by a 0 018 [place] catheter was advanced to the right peroneal artery stenosis, led by V18 and V14 wires  5000 units heparin was administered  The V14 wire was used to recanalized the mid right peroneal artery stenosis  Angioplasty across this stenosis was performed using 2 mm x 80 mm Justin, 2 mm x 60 mm [place], and 2 5 x 15 mm [place] balloons  Angioplasty of the distal SFA was performed 4 mm x 15 mm Justin balloon  Post angioplasty arteriogram was performed  All wires, catheter, and sheath were removed  The left common femoral artery puncture site was closed using Mynx closure device  The patient tolerated the procedure well without apparent immediate complication  The patient was transported to recovery unit in stable condition  Dr Delpha Romberg Doctor was present and scrubbed for key portions of the procedure  Findings: 1  The right common femoral, superficial femoral, profunda femoral, popliteal arteries were patent with mild diffuse disease throughout, without focal stenosis   2   Severe three-vessel tibial disease with complete occlusion of the right anterior tibial artery and occluded posterior tibial artery in the proximal segment  3   Right peroneal artery is patent proximally and distally, with focal stenosis in the mid segment  4   The right posterior tibialis and dorsalis pedis reconstitute distally through the peroneal artery and other collaterals  5   Successful recanalization and angioplasty of the mid right peroneal artery and distal SFA which resulted in complete patency of the right peroneal artery and increased luminal diameter of distal SFA  Impression: Impression: 1  Mild diffuse nonocclusive disease of the above knee arteries in the right lower extremity  2   Successful recanalization of mid right peroneal artery  3   Occluded right anterior tibialis and posterior tibialis arteries with reconstitution of dorsalis pedis and posterior tibialis through collaterals  Workstation performed: [id number]     Vas Lower Limb Venous Duplex Study, Unilateral/limited    Result Date: 8/24/2018  Narrative:  [facility] REPORT CLINICAL: Indications: Patient presents with Left lower extremity pain and swelling x 2 weeks  Known severe multi-level disease in Left calf (8/21/18 arteriography) Operative History: 2018-08-10 Right SFA and mid peroneal artery angioplasty CABG lower extremity a-gram 12/2017 Valve replacement April 2018 Risk Factors The patient has history of HTN, Diabetes (Yes), HLD, CKD, PAD, CAD and DVT  The patient current BMI is 27 63, Weight is 161 lb and height is 64 in  Clinical Right Pressure:  151/68 mm Hg  FINDINGS:  Left      DVT      Peroneal  Chronic     CONCLUSION: Impression: RIGHT LOWER LIMB LIMITED: Evaluation shows no evidence of thrombus in the common femoral vein  Doppler evaluation shows a normal response to augmentation maneuvers  LEFT LOWER LIMB: No evidence of acute deep vein thrombosis  Evidence of chronic non-occlusive thrombus seen in 1of the paired peroneal veins in proximal and distal calf  No evidence of superficial thrombophlebitis noted   Doppler evaluation shows a normal response to augmentation maneuvers  SIGNATURE: Electronically Signed by: Luis Parker MD, [address] on 2018-08-24 01:27:58 PM      Microbiology: cultures obtained in emergency department include - none    EKG, Pathology, and Other Studies: I have personally reviewed pertinent reports  Medications given in Emergency Department     Medication Administration - last 24 hours from 09/06/2018 1918 to 09/07/2018 1918       Date/Time Order Dose Route Action Action by     09/07/2018 1649 sodium chloride 0 9 % bolus 500 mL 0 mL Intravenous Stopped Jose Mccormick RN     09/07/2018 1607 sodium chloride 0 9 % bolus 500 mL 500 mL Intravenous [address] Jose Mccormick RN     09/07/2018 1645 fentanyl citrate (PF) 100 MCG/2ML 25 mcg 25 mcg Intravenous Given Jose Mccormick RN     09/07/2018 1648 sodium chloride 0 9 % bolus 1,000 mL 500 mL Intravenous New Bag Jose Mccormick RN          Assessment and Plan     Problem List     Essential hypertension    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 native heart without angina pectoris    Type 2 diabetes mellitus with complication, with long-term current use of insulin ([facility]    Overview Signed 3/26/2018  1:39 PM by Monica Monsivais MD     Overview:   11/29/10 hemoglobin A1 C7  2  9/10/10 Creatinine 0 7  Normal LFTs 7/26/10   Hemoglobin A1 C7  8  Creatinine 0 5  Normal LFTs  3/9/10 hemoglobin A1 C  7 6  Creatinine 0 6  Urine microalbumin0 9 (less than two)   11/10/09 hemoglobin   A1 C7  8  0 4 (less than two)  7/14/09 hemoglobin A1 C7  6  Normal LFTs    2/24/09  Creatinine 0 6  Calcium 9 2  10/21/08 hemoglobin A1 C7  6  Urine   microalbumin/creatinine ratio six-normal  12/4/07 Hemoglobin A1 C7  1  Lab Results   Component Value Date    HGBA1C 7 7 (H) 04/18/2018       No results for input(s): POCGLU in the last 72 hours      Blood Sugar Average: Last 72 hrs:          Atrial flutter (HCC)    Hyperlipidemia    Gastroesophageal reflux disease without esophagitis    Moderate mitral stenosis    Arthritis of hand    Acute respiratory failure with hypoxia (HCC)    Asthma    Asymptomatic carotid artery stenosis, bilateral    Atherosclerosis of native artery of both lower extremities with bilateral ulceration (HCC)    Hx of CABG    Chronic anticoagulation    Chronic combined systolic and diastolic congestive heart failure (HCC)    Degenerative joint disease involving multiple joints    Diabetic retinopathy ([facility] [address] )    Lab Results   Component Value Date    HGBA1C 7 7 (H) 04/18/2018       No results for input(s): POCGLU in the last 72 hours      Blood Sugar Average: Last 72 hrs:          Postoperative hypothyroidism    Overview Signed 3/26/2018  1:39 PM by Leyla Monsivais MD     Description: postsurgical         Migraine headache    Nephrolithiasis    Osteoarthritis of both knees    Overview Signed 3/26/2018  1:39 PM by Leyla Monsivais MD     Transitioned From: Other secondary osteoarthritis of both knees         Paroxysmal atrial fibrillation (HCC)    Ulcer of toe of left foot (HCC)    Ulcer of toe of right foot (HCC)    Prolonged Q-T interval on ECG    Hypokalemia    Hypocalcemia    Left bundle branch block (LBBB)    1st degree AV block    S/P TAVR (transcatheter aortic valve replacement)    Expressive aphasia    Multiple lacunar infarcts (HCC)    Vomiting    History of CVA (cerebrovascular accident)    Seizure ([facility] [place] [address] )    Supratherapeutic INR    NSVT (nonsustained ventricular tachycardia) (HCC)    Pulmonary hypertension (HCC)    Anemia    Severe sepsis (HCC)    Lactic acidosis    Hypomagnesemia    Bradycardia    Chest pain    Foot pain    PAD (peripheral artery disease) (HCC)    Cellulitis    CKD (chronic kidney disease) stage 3, GFR 30-59 ml/min    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 ([facility] [place] [address] )    Overview Signed 9/5/2018  8:07 AM by Martha Sauceda MD     Added automatically from request for surgery [id number]             1   Left toe gangrene  -cannot rule out infection especially in prior recent admission for cellulitis of the left lower extremity, will start IV Ancef  -Last wound cultures of left foot grew Klebsiella oxytoca, Staph aureus, and Citrobacter amalonaticus,   -vascular surgery consult is following, no surgical intervention currently planned   -Podiatry consult placed  -XR left foot pending  -Will anti-coagulate with IV heparin drip    2  Elevated INR  -unclear of source is patient with was managed on rivaroxaban for anticoagulation  She has also had elevated INRs intermittently in the past  -Follow up morning INR    3  LEONARD  -Creatinine elevated at 1 41 from a baseline of about 0 8  -Holding Lasix and lisinopril in the setting of LEONARD  -75 ml/hr normal saline    4  T2DM  -Continue home Lantus 18 U BID, with sliding scale    5  Hypertension  -Holding Lasix and lisinopril  -Continue home amlodipine 10 mg   -Labetalol PRN 5 mg, hold for HR<50    6  Hypothyroidism  -Continue levothyroxine 100 mcg    7  GERD  -Was on omeprazole (PriLOSEC) 20 mg delayed release capsule at home  -Held PPI while inpatient    8  Hyperlipidemia  -Continue home atorvastatin 80 mg    9  Paroxysmal atrial fibrillation  -Currently in normal sinus rhythm  -Continue amiodarone    10  Left shoulder pain  -Reduced ROM, positive empty can test, with shoulder XR showing bone spur between acromion and humeral head, likely rotator cuff sprain vs tear  -Can be worked up with MRI outpatient    11  Seizure disorder  -continue Keppra 750 mg daily    Code Status: Level 3 - DNAR/DNI  VTE Pharmacologic Prophylaxis: Reason for no pharmacologic prophylaxis Lower extremity wounds  VTE Mechanical Prophylaxis: reason for no mechanical VTE prophylaxis Lower extremity wounds  Admission Status: INPATIENT     Admission Time  I spent 30 minutes admitting the patient    This involved direct patient contact where I performed a full history and physical, reviewing previous records, and reviewing laboratory and other diagnostic studies      Lia Barlow MD  Internal Medicine  PGY-1

## 2018-09-08 LAB
ALBUMIN SERPL BCP-MCNC: 2.8 G/DL (ref 3.5–5)
ALP SERPL-CCNC: 80 U/L (ref 46–116)
ALT SERPL W P-5'-P-CCNC: 17 U/L (ref 12–78)
ANION GAP SERPL CALCULATED.3IONS-SCNC: 7 MMOL/L (ref 4–13)
APTT PPP: 50 SECONDS (ref 24–36)
AST SERPL W P-5'-P-CCNC: 26 U/L (ref 5–45)
ATRIAL RATE: 69 BPM
BASOPHILS # BLD AUTO: 0.05 THOUSANDS/ΜL (ref 0–0.1)
BASOPHILS NFR BLD AUTO: 1 % (ref 0–1)
BILIRUB SERPL-MCNC: 0.54 MG/DL (ref 0.2–1)
BUN SERPL-MCNC: 28 MG/DL (ref 5–25)
CALCIUM SERPL-MCNC: 8.7 MG/DL (ref 8.3–10.1)
CHLORIDE SERPL-SCNC: 105 MMOL/L (ref 100–108)
CO2 SERPL-SCNC: 24 MMOL/L (ref 21–32)
CREAT SERPL-MCNC: 1.09 MG/DL (ref 0.6–1.3)
EOSINOPHIL # BLD AUTO: 0.25 THOUSAND/ΜL (ref 0–0.61)
EOSINOPHIL NFR BLD AUTO: 2 % (ref 0–6)
ERYTHROCYTE [DISTWIDTH] IN BLOOD BY AUTOMATED COUNT: 17.8 % (ref 11.6–15.1)
EST. AVERAGE GLUCOSE BLD GHB EST-MCNC: 137 MG/DL
GFR SERPL CREATININE-BSD FRML MDRD: 48 ML/MIN/1.73SQ M
GLUCOSE SERPL-MCNC: 135 MG/DL (ref 65–140)
GLUCOSE SERPL-MCNC: 74 MG/DL (ref 65–140)
GLUCOSE SERPL-MCNC: 83 MG/DL (ref 65–140)
GLUCOSE SERPL-MCNC: 88 MG/DL (ref 65–140)
GLUCOSE SERPL-MCNC: 95 MG/DL (ref 65–140)
HBA1C MFR BLD: 6.4 % (ref 4.2–6.3)
HCT VFR BLD AUTO: 26.4 % (ref 34.8–46.1)
HGB BLD-MCNC: 7.7 G/DL (ref 11.5–15.4)
IMM GRANULOCYTES # BLD AUTO: 0.08 THOUSAND/UL (ref 0–0.2)
IMM GRANULOCYTES NFR BLD AUTO: 1 % (ref 0–2)
INR PPP: 1.77 (ref 0.86–1.17)
LYMPHOCYTES # BLD AUTO: 1.64 THOUSANDS/ΜL (ref 0.6–4.47)
LYMPHOCYTES NFR BLD AUTO: 16 % (ref 14–44)
MCH RBC QN AUTO: 23.5 PG (ref 26.8–34.3)
MCHC RBC AUTO-ENTMCNC: 29.2 G/DL (ref 31.4–37.4)
MCV RBC AUTO: 81 FL (ref 82–98)
MONOCYTES # BLD AUTO: 1.08 THOUSAND/ΜL (ref 0.17–1.22)
MONOCYTES NFR BLD AUTO: 10 % (ref 4–12)
NEUTROPHILS # BLD AUTO: 7.43 THOUSANDS/ΜL (ref 1.85–7.62)
NEUTS SEG NFR BLD AUTO: 70 % (ref 43–75)
NRBC BLD AUTO-RTO: 0 /100 WBCS
P AXIS: 76 DEGREES
PLATELET # BLD AUTO: 342 THOUSANDS/UL (ref 149–390)
PMV BLD AUTO: 10.4 FL (ref 8.9–12.7)
POTASSIUM SERPL-SCNC: 4.6 MMOL/L (ref 3.5–5.3)
PR INTERVAL: 280 MS
PROT SERPL-MCNC: 7.2 G/DL (ref 6.4–8.2)
PROTHROMBIN TIME: 20.7 SECONDS (ref 11.8–14.2)
QRS AXIS: -41 DEGREES
QRSD INTERVAL: 176 MS
QT INTERVAL: 506 MS
QTC INTERVAL: 542 MS
RBC # BLD AUTO: 3.27 MILLION/UL (ref 3.81–5.12)
SODIUM SERPL-SCNC: 136 MMOL/L (ref 136–145)
T WAVE AXIS: 108 DEGREES
VENTRICULAR RATE: 69 BPM
WBC # BLD AUTO: 10.53 THOUSAND/UL (ref 4.31–10.16)

## 2018-09-08 PROCEDURE — 93010 ELECTROCARDIOGRAM REPORT: CPT | Performed by: INTERNAL MEDICINE

## 2018-09-08 PROCEDURE — 85025 COMPLETE CBC W/AUTO DIFF WBC: CPT | Performed by: INTERNAL MEDICINE

## 2018-09-08 PROCEDURE — 99222 1ST HOSP IP/OBS MODERATE 55: CPT | Performed by: SURGERY

## 2018-09-08 PROCEDURE — 82948 REAGENT STRIP/BLOOD GLUCOSE: CPT

## 2018-09-08 PROCEDURE — 85730 THROMBOPLASTIN TIME PARTIAL: CPT | Performed by: INTERNAL MEDICINE

## 2018-09-08 PROCEDURE — 83036 HEMOGLOBIN GLYCOSYLATED A1C: CPT | Performed by: STUDENT IN AN ORGANIZED HEALTH CARE EDUCATION/TRAINING PROGRAM

## 2018-09-08 PROCEDURE — 80053 COMPREHEN METABOLIC PANEL: CPT | Performed by: INTERNAL MEDICINE

## 2018-09-08 PROCEDURE — 99232 SBSQ HOSP IP/OBS MODERATE 35: CPT | Performed by: INTERNAL MEDICINE

## 2018-09-08 PROCEDURE — 85610 PROTHROMBIN TIME: CPT | Performed by: INTERNAL MEDICINE

## 2018-09-08 RX ORDER — OXYCODONE HYDROCHLORIDE 5 MG/1
5 TABLET ORAL EVERY 4 HOURS PRN
Status: DISCONTINUED | OUTPATIENT
Start: 2018-09-08 | End: 2018-09-08

## 2018-09-08 RX ORDER — FENTANYL 50 UG/H
50 PATCH TRANSDERMAL
Status: DISCONTINUED | OUTPATIENT
Start: 2018-09-08 | End: 2018-09-10

## 2018-09-08 RX ORDER — OXYCODONE HYDROCHLORIDE 5 MG/1
2.5 TABLET ORAL EVERY 4 HOURS PRN
Status: DISCONTINUED | OUTPATIENT
Start: 2018-09-08 | End: 2018-09-08

## 2018-09-08 RX ADMIN — CEFAZOLIN SODIUM 1000 MG: 1 SOLUTION INTRAVENOUS at 12:57

## 2018-09-08 RX ADMIN — MAGNESIUM OXIDE TAB 400 MG (241.3 MG ELEMENTAL MG) 400 MG: 400 (241.3 MG) TAB at 08:01

## 2018-09-08 RX ADMIN — METRONIDAZOLE 500 MG: 500 TABLET ORAL at 01:40

## 2018-09-08 RX ADMIN — ATORVASTATIN CALCIUM 80 MG: 80 TABLET, FILM COATED ORAL at 16:33

## 2018-09-08 RX ADMIN — RIVAROXABAN 15 MG: 15 TABLET, FILM COATED ORAL at 16:34

## 2018-09-08 RX ADMIN — LEVOTHYROXINE SODIUM 100 MCG: 100 TABLET ORAL at 06:02

## 2018-09-08 RX ADMIN — VITAMIN D, TAB 1000IU (100/BT) 1000 UNITS: 25 TAB at 08:30

## 2018-09-08 RX ADMIN — AMIODARONE HYDROCHLORIDE 200 MG: 200 TABLET ORAL at 08:01

## 2018-09-08 RX ADMIN — HYDROMORPHONE HYDROCHLORIDE 0.5 MG: 1 INJECTION, SOLUTION INTRAMUSCULAR; INTRAVENOUS; SUBCUTANEOUS at 00:02

## 2018-09-08 RX ADMIN — AMIODARONE HYDROCHLORIDE 200 MG: 200 TABLET ORAL at 17:44

## 2018-09-08 RX ADMIN — LEVETIRACETAM 750 MG: 750 TABLET, FILM COATED ORAL at 08:30

## 2018-09-08 RX ADMIN — MAGNESIUM OXIDE TAB 400 MG (241.3 MG ELEMENTAL MG) 400 MG: 400 (241.3 MG) TAB at 17:44

## 2018-09-08 RX ADMIN — FENTANYL 50 MCG: 50 PATCH TRANSDERMAL at 15:21

## 2018-09-08 RX ADMIN — HYDROMORPHONE HYDROCHLORIDE 1 MG: 1 INJECTION, SOLUTION INTRAMUSCULAR; INTRAVENOUS; SUBCUTANEOUS at 21:58

## 2018-09-08 RX ADMIN — HEPARIN SODIUM 12 UNITS/KG/HR: 10000 INJECTION, SOLUTION INTRAVENOUS at 00:08

## 2018-09-08 RX ADMIN — GABAPENTIN 100 MG: 100 CAPSULE ORAL at 16:34

## 2018-09-08 RX ADMIN — CEFAZOLIN SODIUM 1000 MG: 1 SOLUTION INTRAVENOUS at 00:02

## 2018-09-08 RX ADMIN — GABAPENTIN 100 MG: 100 CAPSULE ORAL at 08:01

## 2018-09-08 RX ADMIN — INSULIN GLARGINE 18 UNITS: 100 INJECTION, SOLUTION SUBCUTANEOUS at 08:37

## 2018-09-08 RX ADMIN — OXYCODONE HYDROCHLORIDE 5 MG: 5 TABLET ORAL at 12:54

## 2018-09-08 RX ADMIN — HYDROMORPHONE HYDROCHLORIDE 0.5 MG: 1 INJECTION, SOLUTION INTRAMUSCULAR; INTRAVENOUS; SUBCUTANEOUS at 06:02

## 2018-09-08 RX ADMIN — DOCUSATE SODIUM 100 MG: 100 CAPSULE, LIQUID FILLED ORAL at 08:01

## 2018-09-08 RX ADMIN — HYDROMORPHONE HYDROCHLORIDE 0.5 MG: 1 INJECTION, SOLUTION INTRAMUSCULAR; INTRAVENOUS; SUBCUTANEOUS at 14:26

## 2018-09-08 RX ADMIN — HEPARIN SODIUM 14 UNITS/KG/HR: 10000 INJECTION, SOLUTION INTRAVENOUS at 12:03

## 2018-09-08 RX ADMIN — FLUTICASONE PROPIONATE 1 PUFF: 110 AEROSOL, METERED RESPIRATORY (INHALATION) at 21:59

## 2018-09-08 RX ADMIN — CEFAZOLIN SODIUM 1000 MG: 1 SOLUTION INTRAVENOUS at 23:50

## 2018-09-08 RX ADMIN — HYDROMORPHONE HYDROCHLORIDE 1 MG: 1 INJECTION, SOLUTION INTRAMUSCULAR; INTRAVENOUS; SUBCUTANEOUS at 01:28

## 2018-09-08 RX ADMIN — OXYCODONE HYDROCHLORIDE 5 MG: 5 TABLET ORAL at 07:59

## 2018-09-08 RX ADMIN — INSULIN GLARGINE 18 UNITS: 100 INJECTION, SOLUTION SUBCUTANEOUS at 17:44

## 2018-09-08 RX ADMIN — FLUTICASONE PROPIONATE 1 PUFF: 110 AEROSOL, METERED RESPIRATORY (INHALATION) at 08:37

## 2018-09-08 RX ADMIN — GABAPENTIN 100 MG: 100 CAPSULE ORAL at 21:59

## 2018-09-08 RX ADMIN — METRONIDAZOLE 500 MG: 500 TABLET ORAL at 16:33

## 2018-09-08 RX ADMIN — METRONIDAZOLE 500 MG: 500 TABLET ORAL at 08:01

## 2018-09-08 RX ADMIN — AMLODIPINE BESYLATE 10 MG: 10 TABLET ORAL at 08:30

## 2018-09-08 RX ADMIN — METRONIDAZOLE 500 MG: 500 TABLET ORAL at 23:50

## 2018-09-08 NOTE — PROGRESS NOTES
[facility] - Internal Medicine Progress Note      PATIENT INFORMATION      Patient: Gina Ulloa [de-identified] y o  female   MRN: [id number]  PCP: Emilie Stanford MD  Unit/Bed#: -50 Encounter: [id number]  Date Of Visit: 09/08/18  Current Length of Stay: 1 day(s)     ASSESSMENTS & PLAN        1  Severe peripheral vascular disease  Involving both feet but mainly left great toe  Angio from 08/21/2018 shows severe multivessel disease in the left calf, patent left superficial femoral and popliteal arteries with diffuse non flow limiting disease  · Vascular following:  No urgent surgical indications, dopplerable pulses  · Podiatry following: Added Flagyl, wound cultures taken and pending, no emergent indication for surgery, no signs of progression to wet gangrene, plan for surgery after bypass intervention (likely outpatient as pt has scheduled Bypass on 09/12)  Previous wound cultures showed Staph aureus  · Consult for Hyperbaric Oxygen placed by Podiatry  · Cont Ancef/Falgyl  · X-ray shows no signs of osteomyelitis  · Started on diet as no plan for surgery  · Analgesia:  Dilaudid/oxycodone PRN    2  History of DVT/PE  Lower extremity duplex on 08/24/2018 showed nonocclusive thrombus in 1 of the paired peroneal veins in the proximal and distal calf on left side, no thrombus in right limb  On Xarelto chronically for anticoagulation  · Discontinue heparin drip, restart Xarelto  · Hold SCDs in setting of chronic lower extremity wounds    3  Coronary artery disease status post CABG  · Continue atorvastatin/aspirin  · P r n  IV labetalol    4  Paroxysmal AFib  · Continue amiodarone 20 mg b i d  rate control  · Anticoagulation as above    5  Chronic systolic heart failure (without exacerbation)  · Lasix/lisinopril held in setting of LEONARD, resolved  Will discuss restarting Lasix during rounds  6   Elevated INR secondary to her Xarelto  · Anticoagulation as above    7    Hypertension  · Continue Amlodipine  · Lisinopril held due to acute kidney injury  · IV labetalol 5 mg q 6 hours p r n     8   Acute kidney injury  · Resolving  · Will discuss restarting Lasix    9  Type 2 diabetes mellitus  · Lantus and sliding scale insulin            VTE Pharmacologic Prophylaxis: Heparin   VTE Mechanical Prophylaxis: SCD's      SUBJECTIVE     Patient complains of pain in her left foot/toe that has been bothered her all night  She also complains of drainage left toe  She seems irritable and wondering what is the plan currently  Patient denies chest pain, palpitations, SOB, cough, abdominal pain, nausea, vomiting, constipation, diarrhea, fevers/chills, headaches, dysuria  OBJECTIVE     Vitals:   Temp (24hrs), Av 5 °F (36 9 °C), Min:98 2 °F (36 8 °C), Max:98 9 °F (37 2 °C)    HR:  [58-68] 62  Resp:  [18] 18  BP: (130-141)/(56-94) 132/69  SpO2:  [91 %-94 %] 92 %  Body mass index is 27 13 kg/m²  Input and Output Summary (last 24 hours): Intake/Output Summary (Last 24 hours) at 18 0819  Last data filed at 18 1649   Gross per 24 hour   Intake              500 ml   Output                0 ml   Net              500 ml       Physical Exam:   GENERAL: NAD  HEENT:  NC/AT, PERRL, EOMI, MMM, no scleral icterus  CARDIAC:  RRR, +S1/S2, no S3/S4 heard, no m/g/r  PULMONARY:  Mild crackles heard in the lung bases, non-labored breathing  ABDOMEN:  Soft, NT/ND, +BS, no rebound/guarding/rigidity  Extremities:  Erythematous feet bilaterally, tender diffusely throughout feet especially left great toe  Left great toe is cyanotic, tender, however gross sensation intact  0+ pulses though dopplerable per vascular surgery  Dorsal 2nd digit S chart on the foot            ADDITIONAL DATA     Labs & Recent Cultures:       Results from last 7 days  Lab Units 18  0518   WBC Thousand/uL 10 53*   HEMOGLOBIN g/dL 7 7*   HEMATOCRIT % 26 4*   PLATELETS Thousands/uL 342   NEUTROS PCT % 70   LYMPHS PCT % 16   MONOS PCT % 10   EOS PCT % 2       Results from last 7 days  Lab Units 09/08/18  0518   SODIUM mmol/L 136   POTASSIUM mmol/L 4 6   CHLORIDE mmol/L 105   CO2 mmol/L 24   BUN mg/dL 28*   CREATININE mg/dL 1 09   CALCIUM mg/dL 8 7   ALK PHOS U/L 80   ALT U/L 17   AST U/L 26       Results from last 7 days  Lab Units 09/08/18  0518   INR  1 77*                 Last 24 Hours Medication List:     Current Facility-Administered Medications:  acetaminophen 650 mg Oral Q6H PRN Stewart Pastor DO    albuterol 1 puff Inhalation Q4H PRN Kait Sutton MD    amiodarone 200 mg Oral BID Kait Sutton MD    amLODIPine 10 mg Oral Daily Kait Sutton MD    atorvastatin 80 mg Oral Daily Kait Sutton MD    cefazolin 1,000 mg Intravenous Q12H Kait Sutton MD Last Rate: 1,000 mg (09/08/18 0002)   cholecalciferol 1,000 Units Oral Daily Kait Sutton MD    docusate sodium 100 mg Oral BID Kait Sutton MD    fluticasone 1 puff Inhalation Q12H [address] Kait Sutton MD    gabapentin 100 mg Oral TID Kait Sutton MD    heparin (porcine) 3-20 Units/kg/hr (Order-Specific) Intravenous Titrated Stewart Pastor DO Last Rate: 14 Units/kg/hr (09/08/18 0805)   HYDROmorphone 0 5 mg Intravenous Q6H PRN Karis Golden MD    insulin glargine 18 Units Subcutaneous BID Kait Sutton MD    insulin lispro 1-5 Units Subcutaneous TID AC Stewart Pastor DO    insulin lispro 1-5 Units Subcutaneous HS Stewart Pastor DO    labetalol 5 mg Intravenous Q6H PRN Stewart Pastor DO    levETIRAcetam 750 mg Oral Daily Kait Sutton MD    levothyroxine 100 mcg Oral Early Morning Kait Sutton MD    magnesium oxide 400 mg Oral BID Kait Sutton MD    metroNIDAZOLE 500 mg Oral [facility] Esha Reyes DPM    oxyCODONE 2 5 mg Oral Q4H PRN Davis Kinsey MD    oxyCODONE 5 mg Oral Q4H PRN Davis Kinsey MD    sodium chloride 75 mL/hr Intravenous Continuous Kait Sutton MD Last Rate: 75 mL/hr (09/07/18 2135)          Time Spent for Care: 30 mins spent in total   More than 50% of total time spent on counseling and coordination of care as described above  Current Length of Stay: 1 day(s)      Code Status: Level 3 - DNAR and DNI          ** Please Note: This note is constructed using a voice recognition dictation system   **

## 2018-09-08 NOTE — PROGRESS NOTES
Received call from the nurse stating the patient was in significant pain due to her bilateral ulcers  Order placed for Dilaudid 0 5 Q 6 p r n     Nurse also states that patient family wishes to discuss patient's status with primary team   Day team to follow up with this  Received a second call later  In the night stating that patients pain was not controlled on previous Dilaudid  Order placed for another 1mg Dilaudid

## 2018-09-08 NOTE — CONSULTS
Consult - Podiatry   Liborio Fernandez [de-identified] y o  female MRN: [id number]  Unit/Bed#: -01 Encounter: [id number]    Assessment/Plan     Assessment:  1  Left hallux with concerns of progression to wet gangrene, with associated cellulitis  2  Dry gangrene to right 3rd and medial 1st MPJ  3  DTI to right medial hallux  4  Eschar to left 2nd digit  5  PAD  6  LEONARD  7  DM2, A1c 7 7 (04/2018)    Plan:  -Patient seen and examined, nontoxic in appearance with vitals sign stable and however with leukocytosis  -left hallux noted to be gangrenous with concerns for progression to wet gangrene  Distal toe noted to be cyanotic  -XR reviewed- per my read no soft tissue emphysema  Some air noted but likely secondary to mummified tissue    - white submet 1 tissue is secondary to epidermolysis, a probe could be passed underneath epithelium without resistance  By use of 15 blade and pickups, the epithelial layer was trimmed to underlying subcutaneous tissue  Fortunately no areas probed  - wound cultures obtained, will follow up  - pt on ancef per primary team  Will also add flagyl for anaerobic coverage  - painted all areas of gangrene/eschars with betadine paint  Dressed left hallux with betadine wet to dry dressing  - demarcated cellulitis which extends to medial proximal calf, will use demarcation to monitor progression/regression  - no need for emergent surgery given that pt is systemically stable  Will resume diet  -will plan for podiatric surgical intervention after revascularization as long as patient continues to be stable  - will place HBO therapy in the meantime in hopes to help oxygenate tissues prior for maximum tissue perfusion and healing potential  Ordered cxr/ekg for HBO work up  -case discussed in full attending   Also discussed with primary team    Thank you for this consultation, we will follow while in-house    History of Present Illness     HPI:  Liborio Fernandez is a [de-identified] y o  female who presents with worsening left hallux gangrene  Patient states that the left hallux has worsened last 2 weeks  Patient has history of PVD where she had angioplasties of right distal SFA and mid right peroneal artery during her last admission  she also underwent a left lower extremity angiogram and was initially offered a BKA  Patient refused the BKA and sought a 2nd opinion who offered a bypass that was initially scheduled on the 12th of September  Patient lives at [facility], where she felt subjective fevers and was sent to the emergency room for worsening appearance of left toe  Patient states her the/toes are very tender and painful  States left toe got worse last 2 weeks or so  Patient denies chest pain, shortness of breath  Admits to some nausea which has resolved  Inpatient consult to Podiatry     Performed by  Cheo Vasquez     Authorized by Judei Del Toro            Review of Systems   Constitutional: Negative  HENT: Negative  Eyes: Negative  Respiratory: Negative  Cardiovascular: Negative  Gastrointestinal: Negative  Musculoskeletal:  Tender bilateral lower extremities  Skin:  Wound  Neurological: Negative  Psych: negative         Historical Information   Past Medical History:   Diagnosis Date    Altered mental status     Anemia     Anticoagulated     Xarelto for Afib/ flutter    Asthma     Atrial flutter (HCC)     maintained on anticoag w/ Xarelto    Bradycardia     CAD (coronary artery disease)     Candidiasis     Carotid stenosis, bilateral     Cataract     Chest pain     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) (HCC)     Chronic fatigue syndrome     Chronic low back pain     Chronic ulcer of toes (HCC)     left and right    Concussion w loss of consciousness of unsp duration, init     CVA [facility]cere[facility] vascular accident) [facility] [address] ) 4/17/2018    Diabetes mellitus [facility]     type 2, insulin dependent    DJD (degenerative joint disease)     Expressive aphasia     Foot pain     GERD (gastroesophageal reflux disease)     Herpes zoster     HLD (hyperlipidemia)     HTN (hypertension)     Hypothyroidism     Irritable bowel syndrome     Kidney stone     Lumbar radiculopathy     Lyme disease     Dx in hospital 8/2011    MI (myocardial infarction) ([facility] [address] [facility]     Migraines     Muscle spasm     Non-neoplastic nevus     Nontoxic multinodular goiter     NSVT (nonsustained ventricular tachycardia) (HCC)     Osteoarthritis     Other chronic pain     PAD (peripheral artery disease) (HCC)     Palpitations     Paroxysmal atrial fibrillation (HCC)     Pseudogout     Pulmonary hypertension (HCC)     S/P TAVR (transcatheter aortic valve replacement)     Seizures (HCC)     Severe sepsis ([facility] [address] )     Spinal stenosis     Stroke ([facility] [address] ) 05/2018    Transient cerebral ischemia     Trigger ring finger     Viral gastroenteritis      Past Surgical History:   Procedure Laterality Date    APPENDECTOMY      ARTERIOGRAM  12/19/2017    CARDIAC CATHETERIZATION      CHOLECYSTECTOMY      COLONOSCOPY      CORONARY ARTERY BYPASS GRAFT  2004    IR ABDOMINAL ANGIOGRAPHY / INTERVENTION  8/10/2018    IR ABDOMINAL ANGIOGRAPHY / INTERVENTION  8/21/2018    LUMBAR LAMINECTOMY      TN ECHO TRANSESOPHAG R-T 2D W/PRB IMG ACQUISJ I&R N/A 4/3/2018    Procedure: TRANSESOPHAGEAL ECHOCARDIOGRAM (ROBB);   Surgeon: Silvana Bauer DO;  [facility]: [facility];  Service: Cardiac Surgery    TN REPLACE AORTIC VALVE OPENFEMORAL ARTERY APPROACH N/A 4/3/2018    Procedure: REPLACEMENT AORTIC VALVE TRANSCATHETER (TAVR) TRANSFEMORAL w/ 26MM IVY YEVGENIY S3 VALVE;  Surgeon: Silvana Bauer DO;  [facility];  Service: Cardiac Surgery    THYROIDECTOMY      TOTAL ABDOMINAL HYSTERECTOMY W/ BILATERAL SALPINGOOPHORECTOMY       Social History   History   Alcohol Use No     History   Drug Use No     History   Smoking Status    Never Smoker   Smokeless Tobacco    Never Used Family History:   Family History   Problem Relation Age of Onset    Cancer Mother     Stroke Mother     Cancer Father     Heart disease Father     Breast cancer Sister     Diabetes Daughter         Passed away January 2017        Meds/Allergies   Prescriptions Prior to Admission   Medication    acetaminophen (TYLENOL) 650 mg CR tablet    albuterol (VENTOLIN HFA) 90 mcg/act inhaler    amiodarone 200 mg tablet    amLODIPine (NORVASC) 10 mg tablet    atorvastatin (LIPITOR) 80 mg tablet    Cholecalciferol (VITAMIN D3) 1000 units CHEW    docusate sodium (COLACE) 100 mg capsule    fentaNYL (DURAGESIC) 25 mcg/hr    ferrous sulfate 325 (65 Fe) mg tablet    fluticasone (FLONASE) 50 mcg/act nasal spray    furosemide (LASIX) 40 mg tablet    gabapentin (NEURONTIN) 100 mg capsule    glucose blood test strip    Insulin Syringe-Needle U-100 (BD INSULIN SYRINGE ULTRAFINE) 31G X 5/16" 1 ML MISC    LANTUS 100 UNIT/ML subcutaneous injection    levETIRAcetam (KEPPRA) 750 mg tablet    levothyroxine 100 mcg tablet    lisinopril (ZESTRIL) 10 mg tablet    loratadine (CLARITIN) 10 mg tablet    magnesium oxide (MAG-OX) 400 mg    metFORMIN (GLUCOPHAGE) 1000 MG tablet    metoclopramide (REGLAN) 10 mg tablet    Mometasone Furoate (ASMANEX HFA) 100 MCG/ACT AERO    Nutritional Supplements (UTYMAX PO)    omeprazole (PriLOSEC) 20 mg delayed release capsule    oxyCODONE (ROXICODONE) 5 mg immediate release tablet    polyethylene glycol (MIRALAX) 17 g packet    potassium chloride (K-DUR,KLOR-CON) 10 mEq tablet    rivaroxaban (XARELTO) 20 mg tablet    saccharomyces boulardii (FLORASTOR) 250 mg capsule     Allergies   Allergen Reactions    Other Chest Pain     IVP-listed as "chest pain" in previous chart, patient stated this occurred "a long time ago" but does not remember exactly occurred     Contrast [Iodinated Diagnostic Agents] Other (See Comments)     Flash pulm edema    Doxycycline Chest Pain    Ketorolac Other (See Comments)     Chest pain     Levaquin [Levofloxacin] Chest Pain    Ondansetron Chest Pain     Prolonged QT    Toradol [Ketorolac Tromethamine] Chest Pain       Objective   First Vitals:   Blood Pressure: 130/57 (09/07/18 1242)  Pulse: 61 (09/07/18 1242)  Temperature: 98 2 °F (36 8 °C) (09/07/18 1242)  Temp Source: Oral (09/07/18 1242)  Respirations: 18 (09/07/18 1242)  Height: 5' 4" (162 6 cm) (09/07/18 1838)  Weight - Scale: 69 9 kg (154 lb) (09/07/18 1240)  SpO2: 91 % (09/07/18 1242)    Current Vitals:   Blood Pressure: 141/86 (09/07/18 1838)  Pulse: 58 (09/07/18 1838)  Temperature: 98 6 °F (37 °C) (09/07/18 1838)  Temp Source: Oral (09/07/18 1838)  Respirations: 18 (09/07/18 1242)  Height: 5' 4" (162 6 cm) (09/07/18 1838)  Weight - Scale: 69 9 kg (154 lb) (09/07/18 1838)  SpO2: 91 % (09/07/18 1838)        /86 (BP Location: Right arm)   Pulse 58   Temp 98 6 °F (37 °C) (Oral)   Resp 18   Ht 5' 4" (1 626 m)   Wt 69 9 kg (154 lb)   LMP  (LMP Unknown)   SpO2 91%   BMI 26 43 kg/m²      General Appearance:    Alert, cooperative, no distress   Head:    Normocephalic, without obvious abnormality, atraumatic   Eyes:    PERRL, conjunctiva/corneas clear, EOM's intact        Nose:   Moist mucous membranes   Neck:   Supple, symmetrical, trachea midline   Back:     Symmetric   Lungs:     Respirations unlabored   Heart:    Regular rate and rhythm, S1 and S2 normal, no murmur, rub   or gallop   Abdomen:     Soft, non-tender   Extremities: Toes are extremely tender, especially left hallux  Pulses:   Nonpalpable however dopplerable  Cyanotic left hallux  Skin:   Distal left hallux is noted to be cyanotic with dry gangrene at proximal portion of toe  There is epidermal lysis noted to dorsal hallux amp sub met 1 area  Sub met 1 epithelial skin was trimmed to underlying subcutaneous tissue that is noted to be macerated  Interdigital maceration noted in left 1st interspace    No areas probe deep  There is streaky cellulitis extending up to proximal calf  Dorsal 2nd digit eschar on left foot  DTI noted to medial hallux on right foot  Right 1st MPJ medially noted to be with dry eschar as well as 3rd digit  These areas appear stable and without signs of progressing to wet gangrene  Neurologic:   Gross sensation is intact  Protective sensation is intact  Post-debridement        Lab Results:   Admission on 09/07/2018   Component Date Value    WBC 09/07/2018 11 54*    RBC 09/07/2018 3 60*    Hemoglobin 09/07/2018 8 7*    Hematocrit 09/07/2018 29 3*    MCV 09/07/2018 81*    MCH 09/07/2018 24 2*    MCHC 09/07/2018 29 7*    RDW 09/07/2018 17 8*    MPV 09/07/2018 9 9     Platelets 43/02/9791 340     nRBC 09/07/2018 0     Neutrophils Relative 09/07/2018 80*    Immat GRANS % 09/07/2018 1     Lymphocytes Relative 09/07/2018 9*    Monocytes Relative 09/07/2018 8     Eosinophils Relative 09/07/2018 2     Basophils Relative 09/07/2018 0     Neutrophils Absolute 09/07/2018 9 22*    Immature Grans Absolute 09/07/2018 0 09     Lymphocytes Absolute 09/07/2018 1 03     Monocytes Absolute 09/07/2018 0 96     Eosinophils Absolute 09/07/2018 0 20     Basophils Absolute 09/07/2018 0 04     Sodium 09/07/2018 135*    Potassium 09/07/2018 5 7*    Chloride 09/07/2018 103     CO2 09/07/2018 25     ANION GAP 09/07/2018 7     BUN 09/07/2018 45*    Creatinine 09/07/2018 1 41*    Glucose 09/07/2018 98     Calcium 09/07/2018 9 0     eGFR 09/07/2018 35     Protime 09/07/2018 31 5*    INR 09/07/2018 3 04*    PTT 09/07/2018 57*                   Invalid input(s): LABAEARO            Imaging: I have personally reviewed pertinent films in PACS  EKG, Pathology, and Other Studies: I have personally reviewed pertinent reports        Code Status: Level 3 - DNAR and DNI  Advance Directive and Living Will:      Power of :    POLST:

## 2018-09-09 LAB
ANION GAP SERPL CALCULATED.3IONS-SCNC: 4 MMOL/L (ref 4–13)
APTT PPP: 43 SECONDS (ref 24–36)
APTT PPP: 56 SECONDS (ref 24–36)
BUN SERPL-MCNC: 19 MG/DL (ref 5–25)
CALCIUM SERPL-MCNC: 8.5 MG/DL (ref 8.3–10.1)
CHLORIDE SERPL-SCNC: 105 MMOL/L (ref 100–108)
CO2 SERPL-SCNC: 27 MMOL/L (ref 21–32)
CREAT SERPL-MCNC: 0.97 MG/DL (ref 0.6–1.3)
ERYTHROCYTE [DISTWIDTH] IN BLOOD BY AUTOMATED COUNT: 18.3 % (ref 11.6–15.1)
ERYTHROCYTE [DISTWIDTH] IN BLOOD BY AUTOMATED COUNT: 18.5 % (ref 11.6–15.1)
GFR SERPL CREATININE-BSD FRML MDRD: 55 ML/MIN/1.73SQ M
GLUCOSE SERPL-MCNC: 107 MG/DL (ref 65–140)
GLUCOSE SERPL-MCNC: 109 MG/DL (ref 65–140)
GLUCOSE SERPL-MCNC: 113 MG/DL (ref 65–140)
GLUCOSE SERPL-MCNC: 72 MG/DL (ref 65–140)
GLUCOSE SERPL-MCNC: 73 MG/DL (ref 65–140)
GLUCOSE SERPL-MCNC: 95 MG/DL (ref 65–140)
HCT VFR BLD AUTO: 26.2 % (ref 34.8–46.1)
HCT VFR BLD AUTO: 28.4 % (ref 34.8–46.1)
HGB BLD-MCNC: 7.8 G/DL (ref 11.5–15.4)
HGB BLD-MCNC: 8.4 G/DL (ref 11.5–15.4)
INR PPP: 1.52 (ref 0.86–1.17)
MCH RBC QN AUTO: 23.8 PG (ref 26.8–34.3)
MCH RBC QN AUTO: 23.9 PG (ref 26.8–34.3)
MCHC RBC AUTO-ENTMCNC: 29.6 G/DL (ref 31.4–37.4)
MCHC RBC AUTO-ENTMCNC: 29.8 G/DL (ref 31.4–37.4)
MCV RBC AUTO: 80 FL (ref 82–98)
MCV RBC AUTO: 81 FL (ref 82–98)
PLATELET # BLD AUTO: 295 THOUSANDS/UL (ref 149–390)
PLATELET # BLD AUTO: 317 THOUSANDS/UL (ref 149–390)
PMV BLD AUTO: 9.8 FL (ref 8.9–12.7)
PMV BLD AUTO: 9.9 FL (ref 8.9–12.7)
POTASSIUM SERPL-SCNC: 4.6 MMOL/L (ref 3.5–5.3)
PREALB SERPL-MCNC: 17.7 MG/DL (ref 18–40)
PROTHROMBIN TIME: 18.4 SECONDS (ref 11.8–14.2)
RBC # BLD AUTO: 3.26 MILLION/UL (ref 3.81–5.12)
RBC # BLD AUTO: 3.53 MILLION/UL (ref 3.81–5.12)
SODIUM SERPL-SCNC: 136 MMOL/L (ref 136–145)
WBC # BLD AUTO: 8.07 THOUSAND/UL (ref 4.31–10.16)
WBC # BLD AUTO: 9.89 THOUSAND/UL (ref 4.31–10.16)

## 2018-09-09 PROCEDURE — 99233 SBSQ HOSP IP/OBS HIGH 50: CPT | Performed by: INTERNAL MEDICINE

## 2018-09-09 PROCEDURE — 84134 ASSAY OF PREALBUMIN: CPT | Performed by: INTERNAL MEDICINE

## 2018-09-09 PROCEDURE — 85730 THROMBOPLASTIN TIME PARTIAL: CPT | Performed by: INTERNAL MEDICINE

## 2018-09-09 PROCEDURE — 82948 REAGENT STRIP/BLOOD GLUCOSE: CPT

## 2018-09-09 PROCEDURE — 85027 COMPLETE CBC AUTOMATED: CPT | Performed by: INTERNAL MEDICINE

## 2018-09-09 PROCEDURE — 80048 BASIC METABOLIC PNL TOTAL CA: CPT | Performed by: INTERNAL MEDICINE

## 2018-09-09 PROCEDURE — 85610 PROTHROMBIN TIME: CPT | Performed by: INTERNAL MEDICINE

## 2018-09-09 PROCEDURE — 99232 SBSQ HOSP IP/OBS MODERATE 35: CPT | Performed by: SURGERY

## 2018-09-09 RX ORDER — HEPARIN SODIUM 1000 [USP'U]/ML
4000 INJECTION, SOLUTION INTRAVENOUS; SUBCUTANEOUS ONCE
Status: COMPLETED | OUTPATIENT
Start: 2018-09-09 | End: 2018-09-09

## 2018-09-09 RX ORDER — HEPARIN SODIUM 10000 [USP'U]/100ML
3-20 INJECTION, SOLUTION INTRAVENOUS
Status: DISCONTINUED | OUTPATIENT
Start: 2018-09-09 | End: 2018-09-17

## 2018-09-09 RX ORDER — HEPARIN SODIUM 10000 [USP'U]/100ML
3-20 INJECTION, SOLUTION INTRAVENOUS
Status: DISCONTINUED | OUTPATIENT
Start: 2018-09-09 | End: 2018-09-09

## 2018-09-09 RX ORDER — HEPARIN SODIUM 1000 [USP'U]/ML
2000 INJECTION, SOLUTION INTRAVENOUS; SUBCUTANEOUS AS NEEDED
Status: DISCONTINUED | OUTPATIENT
Start: 2018-09-09 | End: 2018-09-17

## 2018-09-09 RX ORDER — HEPARIN SODIUM 1000 [USP'U]/ML
4000 INJECTION, SOLUTION INTRAVENOUS; SUBCUTANEOUS AS NEEDED
Status: DISCONTINUED | OUTPATIENT
Start: 2018-09-09 | End: 2018-09-17

## 2018-09-09 RX ORDER — FUROSEMIDE 40 MG/1
40 TABLET ORAL DAILY
Status: DISCONTINUED | OUTPATIENT
Start: 2018-09-09 | End: 2018-09-24 | Stop reason: HOSPADM

## 2018-09-09 RX ADMIN — HEPARIN SODIUM 4000 UNITS: 1000 INJECTION, SOLUTION INTRAVENOUS; SUBCUTANEOUS at 13:30

## 2018-09-09 RX ADMIN — MAGNESIUM OXIDE TAB 400 MG (241.3 MG ELEMENTAL MG) 400 MG: 400 (241.3 MG) TAB at 17:25

## 2018-09-09 RX ADMIN — GABAPENTIN 100 MG: 100 CAPSULE ORAL at 09:05

## 2018-09-09 RX ADMIN — HEPARIN SODIUM AND DEXTROSE 12 UNITS/KG/HR: 10000; 5 INJECTION INTRAVENOUS at 13:30

## 2018-09-09 RX ADMIN — HYDROMORPHONE HYDROCHLORIDE 1 MG: 1 INJECTION, SOLUTION INTRAMUSCULAR; INTRAVENOUS; SUBCUTANEOUS at 15:09

## 2018-09-09 RX ADMIN — INSULIN GLARGINE 18 UNITS: 100 INJECTION, SOLUTION SUBCUTANEOUS at 17:25

## 2018-09-09 RX ADMIN — CEFAZOLIN SODIUM 1000 MG: 1 SOLUTION INTRAVENOUS at 23:56

## 2018-09-09 RX ADMIN — MAGNESIUM OXIDE TAB 400 MG (241.3 MG ELEMENTAL MG) 400 MG: 400 (241.3 MG) TAB at 09:05

## 2018-09-09 RX ADMIN — HYDROMORPHONE HYDROCHLORIDE 0.5 MG: 1 INJECTION, SOLUTION INTRAMUSCULAR; INTRAVENOUS; SUBCUTANEOUS at 09:06

## 2018-09-09 RX ADMIN — AMLODIPINE BESYLATE 10 MG: 10 TABLET ORAL at 09:05

## 2018-09-09 RX ADMIN — METRONIDAZOLE 500 MG: 500 TABLET ORAL at 15:51

## 2018-09-09 RX ADMIN — FLUTICASONE PROPIONATE 1 PUFF: 110 AEROSOL, METERED RESPIRATORY (INHALATION) at 21:14

## 2018-09-09 RX ADMIN — CEFAZOLIN SODIUM 1000 MG: 1 SOLUTION INTRAVENOUS at 11:09

## 2018-09-09 RX ADMIN — IRON SUCROSE 200 MG: 20 INJECTION, SOLUTION INTRAVENOUS at 15:51

## 2018-09-09 RX ADMIN — HYDROMORPHONE HYDROCHLORIDE 1 MG: 1 INJECTION, SOLUTION INTRAMUSCULAR; INTRAVENOUS; SUBCUTANEOUS at 11:04

## 2018-09-09 RX ADMIN — VITAMIN D, TAB 1000IU (100/BT) 1000 UNITS: 25 TAB at 09:05

## 2018-09-09 RX ADMIN — GABAPENTIN 100 MG: 100 CAPSULE ORAL at 15:51

## 2018-09-09 RX ADMIN — GABAPENTIN 100 MG: 100 CAPSULE ORAL at 21:13

## 2018-09-09 RX ADMIN — METRONIDAZOLE 500 MG: 500 TABLET ORAL at 09:12

## 2018-09-09 RX ADMIN — AMIODARONE HYDROCHLORIDE 200 MG: 200 TABLET ORAL at 09:05

## 2018-09-09 RX ADMIN — FLUTICASONE PROPIONATE 1 PUFF: 110 AEROSOL, METERED RESPIRATORY (INHALATION) at 09:06

## 2018-09-09 RX ADMIN — HEPARIN SODIUM 2000 UNITS: 1000 INJECTION, SOLUTION INTRAVENOUS; SUBCUTANEOUS at 21:13

## 2018-09-09 RX ADMIN — AMIODARONE HYDROCHLORIDE 200 MG: 200 TABLET ORAL at 17:25

## 2018-09-09 RX ADMIN — ATORVASTATIN CALCIUM 80 MG: 80 TABLET, FILM COATED ORAL at 17:25

## 2018-09-09 RX ADMIN — LEVOTHYROXINE SODIUM 100 MCG: 100 TABLET ORAL at 05:38

## 2018-09-09 RX ADMIN — HYDROMORPHONE HYDROCHLORIDE 1 MG: 1 INJECTION, SOLUTION INTRAMUSCULAR; INTRAVENOUS; SUBCUTANEOUS at 05:38

## 2018-09-09 RX ADMIN — HYDROMORPHONE HYDROCHLORIDE 1 MG: 1 INJECTION, SOLUTION INTRAMUSCULAR; INTRAVENOUS; SUBCUTANEOUS at 22:21

## 2018-09-09 RX ADMIN — INSULIN GLARGINE 18 UNITS: 100 INJECTION, SOLUTION SUBCUTANEOUS at 09:52

## 2018-09-09 RX ADMIN — FUROSEMIDE 40 MG: 40 TABLET ORAL at 09:05

## 2018-09-09 RX ADMIN — LEVETIRACETAM 750 MG: 750 TABLET, FILM COATED ORAL at 09:05

## 2018-09-09 NOTE — PROGRESS NOTES
[facility] - Internal Medicine Progress Note      PATIENT INFORMATION      Patient: Chai Angel [de-identified] y o  female   MRN: [id number]  PCP: Bharat Varghese MD  Unit/Bed#: -78 Encounter: [id number]  Date Of Visit: 09/09/18  Current Length of Stay: 2 day(s)     ASSESSMENTS & PLAN      1  Severe peripheral vascular disease  Involving both feet but mainly left great toe  Angio from 08/21/2018 shows severe multivessel disease in the left calf, patent left superficial femoral and popliteal arteries with diffuse non flow limiting disease  · Vascular following:  No urgent surgical indications, dopplerable pulses  Left bypass scheduled on 0 9/12 [address] so we transferred on 09/11  · Podiatry following:  No emergent indication for surgery, plan for intervention after bypass  Previous wound cultures showed Staph aureus  Wound cx growing 3+ gram negative rods  · Consult for Hyperbaric Oxygen placed by Podiatry  · Cont Ancef/Flagyl  · X-ray shows no signs of osteomyelitis  · Started on diet as no plan for surgery currently  · Analgesia:  Dilaudid PRN plus fentanyl patch     2  History of DVT/PE  Lower extremity duplex on 08/24/2018 showed nonocclusive thrombus in 1 of the paired peroneal veins in the proximal and distal calf on left side, no thrombus in right limb  On Xarelto chronically for anticoagulation  · Heparin gtt  · Hold SCDs in setting of chronic lower extremity wounds     3  Coronary artery disease status post CABG  · Continue atorvastatin/aspirin  · P r n  IV labetalol     4   Paroxysmal AFib  · Continue amiodarone 20 mg b i d  rate control  · Anticoagulation as above     5  Chronic systolic heart failure (without exacerbation)  · Lasix/lisinopril held in setting of LEONARD, resolved  Will discuss restarting Lasix during rounds         6  Elevated INR secondary to her Xarelto  · Anticoagulation as above     7    Hypertension  · Continue Amlodipine  · Lisinopril held due to acute kidney injury  · IV labetalol 5 mg q 6 hours p r n      8  Acute kidney injury  · Resolving  · Restart Lasix  · Cont holding Lisinopril     9  Type 2 diabetes mellitus  · Lantus and sliding scale insulin    VTE Pharmacologic Prophylaxis: Heparin   VTE Mechanical Prophylaxis: SCD's      SUBJECTIVE     Patient reports significant improvement in pain  Doing well in the a m   No acute complaints  Patient denies chest pain, palpitations, SOB, cough, abdominal pain, nausea, vomiting, constipation, diarrhea, fevers/chills, headaches, dysuria  OBJECTIVE     Vitals:   Temp (24hrs), Av 5 °F (36 9 °C), Min:98 1 °F (36 7 °C), Max:98 7 °F (37 1 °C)    HR:  [61-66] 62  Resp:  [18] 18  BP: (127-150)/(50-67) 150/65  SpO2:  [91 %-96 %] 96 %  Body mass index is 27 66 kg/m²  Input and Output Summary (last 24 hours):        Intake/Output Summary (Last 24 hours) at 18 0805  Last data filed at 18 1748   Gross per 24 hour   Intake              220 ml   Output                1 ml   Net              219 ml       Physical Exam:   GENERAL: NAD  HEENT:  NC/AT, PERRL, EOMI, MMM, no scleral icterus  CARDIAC:  RRR, +S1/S2, no S3/S4 heard, no m/g/r  PULMONARY:  CTA B/L, no wheezing/rales/rhonci, non-labored breathing  ABDOMEN:  Soft, NT/ND, +BS, no rebound/guarding/rigidity  Extremities:  No change physical exam, both feet wrapped in bandages        ADDITIONAL DATA     Labs & Recent Cultures:       Results from last 7 days  Lab Units 18  0535 18  0518   WBC Thousand/uL 8 07 10 53*   HEMOGLOBIN g/dL 7 8* 7 7*   HEMATOCRIT % 26 2* 26 4*   PLATELETS Thousands/uL 295 342   NEUTROS PCT %  --  70   LYMPHS PCT %  --  16   MONOS PCT %  --  10   EOS PCT %  --  2       Results from last 7 days  Lab Units 18  0535 18  0518   SODIUM mmol/L 136 136   POTASSIUM mmol/L 4 6 4 6   CHLORIDE mmol/L 105 105   CO2 mmol/L 27 24   BUN mg/dL 19 28*   CREATININE mg/dL 0 97 1 09   CALCIUM mg/dL 8 5 8 7   ALK PHOS U/L  --  80 ALT U/L  --  17   AST U/L  --  26       Results from last 7 days  Lab Units 09/08/18  0518   INR  1 77*           Results from last 7 days  Lab Units 09/08/18  1504   GRAM STAIN RESULT  No polys seen  3+ Gram negative rods  1+ Gram positive cocci in pairs         Last 24 Hours Medication List:     Current Facility-Administered Medications:  acetaminophen 650 mg Oral Q6H PRN Signa Greek, DO    albuterol 1 puff Inhalation Q4H PRN Blessing Alvarez MD    amiodarone 200 mg Oral BID Blessing Alvarez MD    amLODIPine 10 mg Oral Daily Blessing Alvarez MD    atorvastatin 80 mg Oral Daily Blessing Alvarez MD    cefazolin 1,000 mg Intravenous Q12H Blessing Alvarez MD Last Rate: 1,000 mg (09/08/18 2350)   cholecalciferol 1,000 Units Oral Daily Blessing Alvarez MD    docusate sodium 100 mg Oral BID Blessing Alvarez MD    fentaNYL 50 mcg Transdermal Q72H Jacob Choudhary MD    fluticasone 1 puff Inhalation Q12H [facility] Blessing Alvarez MD    furosemide 40 mg Oral Daily Davis Kinsey MD    gabapentin 100 mg Oral TID Blessing Alvarez MD    HYDROmorphone 1 mg Intravenous Q4H PRN Jacob Choudhary MD    insulin glargine 18 Units Subcutaneous BID Blessing Alvarez MD    insulin lispro 1-5 Units Subcutaneous TID AC Signa Greek, DO    insulin lispro 1-5 Units Subcutaneous HS Signa Greek, DO    labetalol 5 mg Intravenous Q6H PRN Signfawn Brown, DO    levETIRAcetam 750 mg Oral Daily Blessing Alvarez MD    levothyroxine 100 mcg Oral Early Morning Blessing Alvarez MD    magnesium oxide 400 mg Oral BID Blessing Alvarez MD    metroNIDAZOLE 500 mg Oral [facility] Lianna Read DPM    rivaroxaban 15 mg Oral Daily With Flaquita Rasmussen MD           Time Spent for Care: 30 mins spent in total   More than 50% of total time spent on counseling and coordination of care as described above        Current Length of Stay: 2 day(s)      Code Status: Level 3 - DNAR and DNI Manav Cuenca    ** Please Note: This note is constructed using a voice recognition dictation system   **

## 2018-09-09 NOTE — PLAN OF CARE
Problem: Potential for Falls  Goal: Patient will remain free of falls  INTERVENTIONS:  - Assess patient frequently for physical needs  -  Identify cognitive and physical deficits and behaviors that affect risk of falls  -  [place] fall precautions as indicated by assessment   - Educate patient/family on patient safety including physical limitations  - Instruct patient to call for assistance with activity based on assessment  - Modify environment to reduce risk of injury  - Consider OT/PT consult to assist with strengthening/mobility   Outcome: Progressing      Problem: Prexisting or High Potential for Compromised Skin Integrity  Goal: Skin integrity is maintained or improved  INTERVENTIONS:  - Identify patients at risk for skin breakdown  - Assess and monitor skin integrity  - Assess and monitor nutrition and hydration status  - Monitor labs (i e  albumin)  - Assess for incontinence   - Turn and reposition patient  - Assist with mobility/ambulation  - Relieve pressure over bony prominences  - Avoid friction and shearing  - Provide appropriate hygiene as needed including keeping skin clean and dry  - Evaluate need for skin moisturizer/barrier cream  - Collaborate with interdisciplinary team (i e  Nutrition, Rehabilitation, etc )   - Patient/family teaching   Outcome: Progressing      Problem: Nutrition/Hydration-ADULT  Goal: Nutrient/Hydration intake appropriate for improving, restoring or maintaining nutritional needs  Monitor and assess patient's nutrition/hydration status for malnutrition (ex- brittle hair, bruises, dry skin, pale skin and conjunctiva, muscle wasting, smooth red tongue, and disorientation)  Collaborate with interdisciplinary team and initiate plan and interventions as ordered  Monitor patient's weight and dietary intake as ordered or per policy  Utilize nutrition screening tool and intervene per policy   Determine patient's food preferences and provide high-protein, high-caloric foods as appropriate       INTERVENTIONS:  - Monitor oral intake, urinary output, labs, and treatment plans  - Assess nutrition and hydration status and recommend course of action  - Evaluate amount of meals eaten  - Assist patient with eating if necessary   - Allow adequate time for meals  - Recommend/ encourage appropriate diets, oral nutritional supplements, and vitamin/mineral supplements  - Order, calculate, and assess calorie counts as needed  - Recommend, monitor, and adjust tube feedings and TPN/PPN based on assessed needs  - Assess need for intravenous fluids  - Provide specific nutrition/hydration education as appropriate  - Include patient/family/caregiver in decisions related to nutrition   Outcome: Progressing      Problem: DISCHARGE PLANNING - CARE MANAGEMENT  Goal: Discharge to post-acute care or home with appropriate resources  INTERVENTIONS:  - Conduct assessment to determine patient/family and health care team treatment goals, and need for post-acute services based on payer coverage, community resources, and patient preferences, and barriers to discharge  - Address psychosocial, clinical, and financial barriers to discharge as identified in assessment in conjunction with the patient/family and health care team  - Arrange appropriate level of post-acute services according to patient's   needs and preference and payer coverage in collaboration with the physician and health care team  - Communicate with and update the patient/family, physician, and health care team regarding progress on the discharge plan  - Arrange appropriate transportation to post-acute venues   Outcome: Progressing

## 2018-09-09 NOTE — SOCIAL WORK
CM met with Pt with an introduction and explanation of role  Pt reported residing alone in a 2 story home with a first floor set, the use of roller walker, commode, SPC, quad cane, shower chair and 5 steps to enter  Pt reported being independent with ADLs, had [place] [facility] and was at [facility] in the past  Pt denied any menta health or drug/alcohol placements  Pt reported not being sure if she has a living will and listed her son Guanako Hatfield as her main emergency contact  Pt reported using [facility] pharmacy on [address] in [place] and has Dr Derrick Hutton as a PCP  CM reviewed d/c planning process including the following: identifying help at home, patient preference for d/c planning needs, Discharge Lounge, [facility] Meds to Bed program, availability of treatment team to discuss questions or concerns patient and/or family may have regarding understanding medications and recognizing signs and symptoms once discharged  CM also encouraged patient to follow up with all recommended appointments after discharge  Patient advised of importance for patient and family to participate in managing patients medical well being

## 2018-09-09 NOTE — PROGRESS NOTES
Progress Note - Vascular Surgery   Ximena Tello [de-identified] y o  female MRN: [id number]  Unit/Bed#: -01 Encounter: [id number]    Assessment/Plan:  Ximena Tello is a [de-identified] y o  female with history dry gangrene left great toe who presents with fever, leukocytosis, increased discoloration of left great toe, and fluid discharge from the base of her left great toe which is concerning for wet gangrene  In addition she has dopplerable pedal pulses and a therapeutic level of INR at 3 04     -Stop Xarelto at this time  Restart heparin gtt   -Plan for surgery on 9/12 at [facility], will need to be transferred 9/11  -Continue antibiotics  -Appreciate podiatry recs, will likely need intervention s/p revascularization  -Medical management per primary team    Subjective/Objective     Subjective: No acute events overnight  Patient doing well this AM, now new complaints  Objective:     Vitals: Blood pressure 139/67, pulse 66, temperature 98 7 °F (37 1 °C), temperature source Oral, resp  rate 18, height 5' 4" (1 626 m), weight 71 7 kg (158 lb 1 1 oz), SpO2 91 %, not currently breastfeeding  ,Body mass index is 27 13 kg/m²  I/O       09/07 0701 - 09/08 0700 09/08 0701 - 09/09 0700 09/09 0701 - 09/10 0700    P  O   400     IV Piggyback 500      Total Intake(mL/kg) 500 (7) 400 (5 5)     Stool  1     Total Output   1      Net +500 +399             Unmeasured Urine Occurrence 4 x 3 x         Physical Exam   Constitutional: She is oriented to person, place, and time  She appears well-developed and well-nourished  HENT:   Head: Normocephalic and atraumatic  Mouth/Throat: Oropharynx is clear and moist    Eyes: Conjunctivae and EOM are normal  Pupils are equal, round, and reactive to light  Neck: Normal range of motion  Neck supple  No JVD present  Cardiovascular: Normal rate, regular rhythm and intact distal pulses      Pulses:  B/l DP signals  B/l PT signals  Pulmonary/Chest: Effort normal and breath sounds normal  No respiratory distress  Abdominal: Soft  She exhibits no distension  There is no tenderness  Musculoskeletal: Normal range of motion  She exhibits no edema  Neurological: She is alert and oriented to person, place, and time  Skin: Skin is warm       Lab, Imaging and other studies:   Lab Results   Component Value Date    GLUCOSE 220 (H) 04/29/2018    CALCIUM 8 7 09/08/2018     09/08/2018    K 4 6 09/08/2018    CO2 24 09/08/2018     09/08/2018    BUN 28 (H) 09/08/2018    CREATININE 1 09 09/08/2018     Lab Results   Component Value Date    WBC 10 53 (H) 09/08/2018    HGB 7 7 (L) 09/08/2018    HCT 26 4 (L) 09/08/2018    MCV 81 (L) 09/08/2018     09/08/2018     Lab Results   Component Value Date    INR 1 77 (H) 09/08/2018    INR 2 26 (H) 09/07/2018    INR 3 04 (H) 09/07/2018    PROTIME 20 7 (H) 09/08/2018    PROTIME 25 0 (H) 09/07/2018    PROTIME 31 5 (H) 09/07/2018       VTE Pharmacologic Prophylaxis: Currently on Xarelto  VTE Mechanical Prophylaxis: reason for no mechanical VTE prophylaxis , lower extremity wounds

## 2018-09-09 NOTE — PROGRESS NOTES
Progress Note - Podiatry  Miko Keller [de-identified] y o  female MRN: [id number]  Unit/Bed#: -01 Encounter: [id number]    Assessment:  1  Left hallux with concerns of progression to wet gangrene, with associated cellulitis  2  Dry gangrene to right 3rd and medial 1st MPJ  3  DTI to right medial hallux  4  Eschar to left 2nd digit  5  PAD  6  LEONARD  7  DM2, A1c 7 7 (04/2018)     Plan:  - Patient seen and examined, nontoxic in appearance with vitals sign stable and without leukocytosis  -wound cultures growing 3+ Gram-negative rods, 1+ Gram-positive cocci  -patient scheduled for bypass on 09/12/2018 at [facility], with plans to transfer patient to [facility] 9/11 noted  -will continue local wound care as site looks stable  No indication for emergent surgery, will await vascular intervention prior to any podiatric surgical intervention   -continue local wound care with Betadine wet to dry to right hallux and Betadine paint/DSD to all gangrenous lesions on bilateral toes  - pt continues to complain of burning pain to feet      Subjective/Objective   Chief Complaint:   Chief Complaint   Patient presents with    Foot Ulcer     PER ems, "FROM [facility], SENT FOR EVAL OF BILAT TOE INFECTIONS, ONGOING PROBLEM"       Subjective: [de-identified] y o  y/o female was seen and evaluated at bedside  No acute events overnight  NAD  Lying comfortably in bed  Denies recent nausea, fever, shortness of breath, chest pain, chills, fever  Continues to complain of foot pain    Blood pressure 150/65, pulse 62, temperature 98 1 °F (36 7 °C), temperature source Oral, resp  rate 18, height 5' 4" (1 626 m), weight 73 1 kg (161 lb 2 5 oz), SpO2 96 %, not currently breastfeeding  ,Body mass index is 27 66 kg/m²      Invasive Devices     Peripheral Intravenous Line            Peripheral IV 09/07/18 Right Antecubital 1 day    Peripheral IV 09/07/18 Right Forearm 1 day                Physical Exam:   General: Alert, cooperative and no distress  Lungs: Non labored breathing  Heart: Positive S1, S2  Abdomen: Soft, non-tender  Extremity:  Gross motor function and neurovascular status at baseline  No calf tenderness bilaterally  All lesions appear stable  Although cyanotic/gangrenous in appearance, hallux is stable  There is a small area of epidermolysis and some mild drainage in plantar sulcus at 1st interspace however does not probe  Lab, Imaging and other studies:   CBC:   Lab Results   Component Value Date    WBC 8 07 09/09/2018    HGB 7 8 (L) 09/09/2018    HCT 26 2 (L) 09/09/2018    MCV 80 (L) 09/09/2018     09/09/2018    MCH 23 9 (L) 09/09/2018    MCHC 29 8 (L) 09/09/2018    RDW 18 3 (H) 09/09/2018    MPV 9 9 09/09/2018   , CMP:   Lab Results   Component Value Date     09/09/2018    K 4 6 09/09/2018     09/09/2018    CO2 27 09/09/2018    BUN 19 09/09/2018    CREATININE 0 97 09/09/2018    CALCIUM 8 5 09/09/2018    EGFR 55 09/09/2018       Imaging: I have personally reviewed pertinent films in PACS  EKG, Pathology, and Other Studies: I have personally reviewed pertinent reports

## 2018-09-09 NOTE — CASE MANAGEMENT
Initial Clinical Review    Admission: Date/Time/Statement: 9/7/18 @ 1729     Orders Placed This Encounter   Procedures    Inpatient Admission (expected length of stay for this patient is greater than two midnights)     Standing Status:   Standing     Number of Occurrences:   1     Order Specific Question:   Admitting Physician     Answer:   Dion Fonseca [[id number]]     Order Specific Question:   Level of Care     Answer:   Med Surg [[id number]]     Order Specific Question:   Estimated length of stay     Answer:   More than 2 Midnights     Order Specific Question:   Certification     Answer:   I certify that inpatient services are medically necessary for this patient for a duration of greater than two midnights  See H&P and MD Progress Notes for additional information about the patient's course of treatment  ED: Date/Time/Mode of Arrival:   ED Arrival Information     Expected Arrival Acuity Means of Arrival Escorted By Service Admission Type    - 9/7/2018 12:33 Urgent Ambulance [facility] General Medicine Urgent    Arrival Complaint    leg pain          Chief Complaint:   Chief Complaint   Patient presents with    Foot Ulcer     PER ems, "FROM [facility], SENT FOR EVAL OF BILAT TOE INFECTIONS, ONGOING PROBLEM"       History of Illness: [de-identified] y o  female with a past medical history significant for 3 myocardial infarctions s/p 6 vessel bypass, multiple CVAs, pAF, hyperlipidemia, peripheral artery disease, short-term memory loss, and chronic anemia who presents with swelling of her left first toe  Of note, the patient was recently admitted on August for management of bilateral toe wounds and left foot cellulitis  At the time, the patient was treated with a 7 day course of Ancef for cellulitis/right foot osteomyelitis  She received a LLE angiogram showing multi-vessel disease with poor runoff  Vascular surgery was following, and offered the patient a left BKA    The patient declined and instead a plan was made to have a bypass procedure performed on 09/12/2018  She was discharged to short-term rehab at the end of August  Since then, the patient states that she has noticed blue discoloration of her left toe starting yesterday  She noticed drainage into her left shoe yesterday as well, which by the time it dried was tan in color  She noted that it has been leaking a similar fluid for at least weeks but could not state exactly for how long  She did not notice any change in pain when the toe turned blue  No other digits were noted to have discoloration  She has not been able to walk around at her home facility at [facility] without using a walker, which she did not need often prior to the past several days  She also has had left shoulder pain the past week, likely unrelated  The patient denied any recent trauma to the feet  Associated symptoms included nausea and a low-grade fever  She was told she had this fever by [facility] staff and exact temperature was not noted  ED Vital Signs:   ED Triage Vitals [09/07/18 1242]   Temperature Pulse Respirations Blood Pressure SpO2   98 2 °F (36 8 °C) 61 18 130/57 91 %      Temp Source Heart Rate Source Patient Position - Orthostatic VS BP Location FiO2 (%)   Oral Monitor Sitting Left arm --      Pain Score       Worst Possible Pain        Wt Readings from Last 1 Encounters:   09/09/18 73 1 kg (161 lb 2 5 oz)       Vital Signs (abnormal):  WNL    Abnormal Labs/Diagnostic Test Results:   Units 09/07/18  1503   WBC Thousand/uL 11 54*   RBC Million/uL 3 60*   HEMOGLOBIN g/dL 8 7*   HEMATOCRIT % 29 3*   MCV fL 81*   MCH pg 24 2*   MCHC g/dL 29 7*   RDW % 17 8*   MPV fL 9 9   PLATELETS Thousands/uL 340     Lab Units 09/07/18  1503   SODIUM mmol/L 135*   POTASSIUM mmol/L 5 7*   CHLORIDE mmol/L 103   CO2 mmol/L 25   BUN mg/dL 45*   CREATININE mg/dL 1 41*      Ref Range & Units 09/07/18 1503   Protime 11 8 - 14 2 seconds 31 5     INR 0 86 - 1 17 3 04       Wound cx -- (P)      ED Treatment:   Medication Administration from 09/07/2018 1233 to 09/07/2018 6461       Date/Time Order Dose Route Action Action by Comments     09/07/2018 1607 sodium chloride 0 9 % bolus 500 mL 500 mL Intravenous New Bag Elli Barron RN      09/07/2018 1645 fentanyl citrate (PF) 100 MCG/2ML 25 mcg 25 mcg Intravenous Given Elli Barron RN      09/07/2018 1648 sodium chloride 0 9 % bolus 1,000 mL 500 mL Intravenous New Bag Elli Barron RN per dr Angely Rothman, pt to have total 1 liter NSS including previous 500 ml order       Past Medical/Surgical History:   Past Medical History:   Diagnosis Date    Altered mental status     Anemia     Anticoagulated     Asthma     Atrial flutter ([facility] )     Bradycardia     CAD (coronary artery disease)     Candidiasis     Carotid stenosis, bilateral     Cataract     Chest pain     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) [facility]     Chronic fatigue syndrome     Chronic low back pain     Chronic ulcer of toes ([facility])     Concussion w loss of consciousness of unsp duration, init     CVA (cerebral vascular accident) ([facility] [facility] ) 4/17/2018    Diabetes mellitus ([facility] )     DJD (degenerative joint disease)     Expressive aphasia     Foot pain     GERD (gastroesophageal reflux disease)     Herpes zoster     HLD (hyperlipidemia)     HTN (hypertension)     Hypothyroidism     Irritable bowel syndrome     Kidney stone     Lumbar radiculopathy     Lyme disease     MI (myocardial infarction) ([address] )     Migraines     Muscle spasm     Non-neoplastic nevus     Nontoxic multinodular goiter     NSVT (nonsustained ventricular tachycardia) ([facility])     Osteoarthritis     Other chronic pain     PAD (peripheral artery disease) ([facility])     Palpitations     Paroxysmal atrial fibrillation ([facility])     Pseudogout     Pulmonary hypertension ([address] )     S/P TAVR (transcatheter aortic valve replacement)     Seizures ([facility])     Severe sepsis ([facility] [address] )     Spinal stenosis     Stroke [facility]) 05/2018    Transient cerebral ischemia     Trigger ring finger     Viral gastroenteritis        Admitting Diagnosis: Leg pain [M79 606]  Wet gangrene ([facility]) [I96]  PAD (peripheral artery disease) ([facility]) [I73 9]  LEONARD (acute kidney injury) ([facility] [address] ) [N17 9]  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 ([facility] [I70 269]    Age/Sex: [de-identified] y o  female    Assessment/Plan:   1  Left toe gangrene  -cannot rule out infection especially in prior recent admission for cellulitis of the left lower extremity, will start IV Ancef  -Last wound cultures of left foot grew Klebsiella oxytoca, Staph aureus, and Citrobacter amalonaticus,   -vascular surgery consult is following, no surgical intervention currently planned   -Podiatry consult placed  -XR left foot pending  -Will anti-coagulate with IV heparin drip     2   Elevated INR  -unclear of source is patient with was managed on rivaroxaban for anticoagulation  She has also had elevated INRs intermittently in the past  -Follow up morning INR     3  LEONARD  -Creatinine elevated at 1 41 from a baseline of about 0 8  -Holding Lasix and lisinopril in the setting of LEONARD  -75 ml/hr normal saline     4  T2DM  -Continue home Lantus 18 U BID, with sliding scale     5  Hypertension  -Holding Lasix and lisinopril  -Continue home amlodipine 10 mg   -Labetalol PRN 5 mg, hold for HR<50     6  Hypothyroidism  -Continue levothyroxine 100 mcg     7  GERD  -Was on omeprazole (PriLOSEC) 20 mg delayed release capsule at home  -Held PPI while inpatient     8  Hyperlipidemia  -Continue home atorvastatin 80 mg     9  Paroxysmal atrial fibrillation  -Currently in normal sinus rhythm  -Continue amiodarone     10  Left shoulder pain  -Reduced ROM, positive empty can test, with shoulder XR showing bone spur between acromion and humeral head, likely rotator cuff sprain vs tear  -Can be worked up with MRI outpatient     11    Seizure disorder  -continue Keppra 750 mg daily       Admission Orders:  Admit to M/S/Tele unit  Telem  prevalin boot RLE  Wound care  accuchecks qid w/ ssi  Cardiac diet  Monitor I&O's  Consult podiatry, vascular surgery, hyperbariac      Scheduled Meds:   Current Facility-Administered Medications:  acetaminophen 650 mg Oral Q6H PRN Reather Toño, DO    albuterol 1 puff Inhalation Q4H PRN Milla Mcgill MD    amiodarone 200 mg Oral BID Milla Mcgill MD    amLODIPine 10 mg Oral Daily Milla Mcgill MD    atorvastatin 80 mg Oral Daily Milla Mcgill MD    cefazolin 1,000 mg Intravenous Q12H Milla Mcgill MD Last Rate: Stopped (09/09/18 1139)   cholecalciferol 1,000 Units Oral Daily Milla Mcgill MD    docusate sodium 100 mg Oral BID Milla Mcgill MD    fentaNYL 50 mcg Transdermal Todd Condon MD    fluticasone 1 puff Inhalation Q12H [facility] Milla Mcgill MD    furosemide 40 mg Oral Daily Davis Kinsey MD    gabapentin 100 mg Oral TID Milal Mcgill MD    heparin (porcine) 3-20 Units/kg/hr (Order-Specific) Intravenous Titrated Carlton Kinsey MD Last Rate: 12 Units/kg/hr (09/09/18 1330)   heparin (porcine) 2,000 Units Intravenous PRN Carlton Kinsey MD    heparin (porcine) 4,000 Units Intravenous PRN Carlton Kinsey MD    HYDROmorphone 1 mg Intravenous Q4H PRN Billy Contreras MD    insulin glargine 18 Units Subcutaneous BID Milla Mcgill MD    insulin lispro 1-5 Units Subcutaneous TID AC Remariana Lundberg, DO    insulin lispro 1-5 Units Subcutaneous HS Reather Toño, DO    iron sucrose 200 mg Intravenous Once Billy Contreras MD    labetalol 5 mg Intravenous Q6H PRN Jonny Lundberg, DO    levETIRAcetam 750 mg Oral Daily Milla Mcgill MD    levothyroxine 100 mcg Oral Early Morning Milla Mcgill MD    magnesium oxide 400 mg Oral BID Milla Mcgill MD    metroNIDAZOLE 500 mg Oral [facility] Edward Cisneros DPM      Continuous Infusions:   heparin (porcine) 3-20 Units/kg/hr (Order-Specific) Last Rate: 12 Units/kg/hr (09/09/18 1330)     PRN Meds:   acetaminophen    albuterol   heparin (porcine)    HYDROmorphone    labetalol

## 2018-09-10 PROBLEM — I96 WET GANGRENE (HCC): Status: ACTIVE | Noted: 2018-09-07

## 2018-09-10 LAB
ANION GAP SERPL CALCULATED.3IONS-SCNC: 6 MMOL/L (ref 4–13)
APTT PPP: 103 SECONDS (ref 24–36)
APTT PPP: 57 SECONDS (ref 24–36)
APTT PPP: 60 SECONDS (ref 24–36)
BACTERIA SPEC ANAEROBE CULT: NORMAL
BUN SERPL-MCNC: 15 MG/DL (ref 5–25)
CALCIUM SERPL-MCNC: 8.3 MG/DL (ref 8.3–10.1)
CHLORIDE SERPL-SCNC: 103 MMOL/L (ref 100–108)
CO2 SERPL-SCNC: 26 MMOL/L (ref 21–32)
CREAT SERPL-MCNC: 0.98 MG/DL (ref 0.6–1.3)
ERYTHROCYTE [DISTWIDTH] IN BLOOD BY AUTOMATED COUNT: 18.7 % (ref 11.6–15.1)
GFR SERPL CREATININE-BSD FRML MDRD: 55 ML/MIN/1.73SQ M
GLUCOSE SERPL-MCNC: 104 MG/DL (ref 65–140)
GLUCOSE SERPL-MCNC: 174 MG/DL (ref 65–140)
GLUCOSE SERPL-MCNC: 72 MG/DL (ref 65–140)
GLUCOSE SERPL-MCNC: 79 MG/DL (ref 65–140)
GLUCOSE SERPL-MCNC: 90 MG/DL (ref 65–140)
GLUCOSE SERPL-MCNC: 94 MG/DL (ref 65–140)
HCT VFR BLD AUTO: 26.1 % (ref 34.8–46.1)
HGB BLD-MCNC: 7.6 G/DL (ref 11.5–15.4)
MCH RBC QN AUTO: 23.5 PG (ref 26.8–34.3)
MCHC RBC AUTO-ENTMCNC: 29.1 G/DL (ref 31.4–37.4)
MCV RBC AUTO: 81 FL (ref 82–98)
PLATELET # BLD AUTO: 297 THOUSANDS/UL (ref 149–390)
PMV BLD AUTO: 10.1 FL (ref 8.9–12.7)
POTASSIUM SERPL-SCNC: 4.5 MMOL/L (ref 3.5–5.3)
RBC # BLD AUTO: 3.23 MILLION/UL (ref 3.81–5.12)
SODIUM SERPL-SCNC: 135 MMOL/L (ref 136–145)
WBC # BLD AUTO: 11.29 THOUSAND/UL (ref 4.31–10.16)

## 2018-09-10 PROCEDURE — 99232 SBSQ HOSP IP/OBS MODERATE 35: CPT | Performed by: SURGERY

## 2018-09-10 PROCEDURE — 82948 REAGENT STRIP/BLOOD GLUCOSE: CPT

## 2018-09-10 PROCEDURE — 85027 COMPLETE CBC AUTOMATED: CPT | Performed by: INTERNAL MEDICINE

## 2018-09-10 PROCEDURE — 99233 SBSQ HOSP IP/OBS HIGH 50: CPT | Performed by: INTERNAL MEDICINE

## 2018-09-10 PROCEDURE — 85730 THROMBOPLASTIN TIME PARTIAL: CPT | Performed by: INTERNAL MEDICINE

## 2018-09-10 PROCEDURE — 80048 BASIC METABOLIC PNL TOTAL CA: CPT | Performed by: INTERNAL MEDICINE

## 2018-09-10 RX ORDER — RIVAROXABAN 20 MG/1
TABLET, FILM COATED ORAL
Qty: 90 TABLET | Refills: 0 | OUTPATIENT
Start: 2018-09-10

## 2018-09-10 RX ORDER — POLYETHYLENE GLYCOL 3350 17 G/17G
17 POWDER, FOR SOLUTION ORAL DAILY
Status: DISCONTINUED | OUTPATIENT
Start: 2018-09-10 | End: 2018-09-24 | Stop reason: HOSPADM

## 2018-09-10 RX ORDER — OXYCODONE HYDROCHLORIDE 5 MG/1
5 TABLET ORAL EVERY 4 HOURS PRN
Status: DISCONTINUED | OUTPATIENT
Start: 2018-09-10 | End: 2018-09-22

## 2018-09-10 RX ORDER — FENTANYL 25 UG/H
25 PATCH TRANSDERMAL
Status: DISCONTINUED | OUTPATIENT
Start: 2018-09-11 | End: 2018-09-14

## 2018-09-10 RX ORDER — AMOXICILLIN 250 MG
1 CAPSULE ORAL
Status: DISCONTINUED | OUTPATIENT
Start: 2018-09-10 | End: 2018-09-24 | Stop reason: HOSPADM

## 2018-09-10 RX ADMIN — HYDROMORPHONE HYDROCHLORIDE 1 MG: 1 INJECTION, SOLUTION INTRAMUSCULAR; INTRAVENOUS; SUBCUTANEOUS at 03:06

## 2018-09-10 RX ADMIN — VITAMIN D, TAB 1000IU (100/BT) 1000 UNITS: 25 TAB at 08:43

## 2018-09-10 RX ADMIN — GABAPENTIN 100 MG: 100 CAPSULE ORAL at 16:36

## 2018-09-10 RX ADMIN — FLUTICASONE PROPIONATE 1 PUFF: 110 AEROSOL, METERED RESPIRATORY (INHALATION) at 21:40

## 2018-09-10 RX ADMIN — Medication 1 TABLET: at 21:40

## 2018-09-10 RX ADMIN — FUROSEMIDE 40 MG: 40 TABLET ORAL at 08:43

## 2018-09-10 RX ADMIN — POLYETHYLENE GLYCOL 3350 17 G: 17 POWDER, FOR SOLUTION ORAL at 18:29

## 2018-09-10 RX ADMIN — METRONIDAZOLE 500 MG: 500 TABLET ORAL at 16:36

## 2018-09-10 RX ADMIN — AMIODARONE HYDROCHLORIDE 200 MG: 200 TABLET ORAL at 08:43

## 2018-09-10 RX ADMIN — OXYCODONE HYDROCHLORIDE 5 MG: 5 TABLET ORAL at 18:47

## 2018-09-10 RX ADMIN — HYDROMORPHONE HYDROCHLORIDE 1 MG: 1 INJECTION, SOLUTION INTRAMUSCULAR; INTRAVENOUS; SUBCUTANEOUS at 07:09

## 2018-09-10 RX ADMIN — AMIODARONE HYDROCHLORIDE 200 MG: 200 TABLET ORAL at 18:29

## 2018-09-10 RX ADMIN — ACETAMINOPHEN 650 MG: 325 TABLET, FILM COATED ORAL at 01:03

## 2018-09-10 RX ADMIN — DOCUSATE SODIUM 100 MG: 100 CAPSULE, LIQUID FILLED ORAL at 08:44

## 2018-09-10 RX ADMIN — HYDROMORPHONE HYDROCHLORIDE 0.5 MG: 1 INJECTION, SOLUTION INTRAMUSCULAR; INTRAVENOUS; SUBCUTANEOUS at 20:35

## 2018-09-10 RX ADMIN — GABAPENTIN 100 MG: 100 CAPSULE ORAL at 21:40

## 2018-09-10 RX ADMIN — HEPARIN SODIUM AND DEXTROSE 14 UNITS/KG/HR: 10000; 5 INJECTION INTRAVENOUS at 16:36

## 2018-09-10 RX ADMIN — METRONIDAZOLE 500 MG: 500 TABLET ORAL at 08:43

## 2018-09-10 RX ADMIN — INSULIN LISPRO 1 UNITS: 100 INJECTION, SOLUTION INTRAVENOUS; SUBCUTANEOUS at 11:45

## 2018-09-10 RX ADMIN — METRONIDAZOLE 500 MG: 500 TABLET ORAL at 23:27

## 2018-09-10 RX ADMIN — FLUTICASONE PROPIONATE 1 PUFF: 110 AEROSOL, METERED RESPIRATORY (INHALATION) at 08:44

## 2018-09-10 RX ADMIN — LEVOTHYROXINE SODIUM 100 MCG: 100 TABLET ORAL at 05:47

## 2018-09-10 RX ADMIN — ATORVASTATIN CALCIUM 80 MG: 80 TABLET, FILM COATED ORAL at 16:36

## 2018-09-10 RX ADMIN — INSULIN GLARGINE 18 UNITS: 100 INJECTION, SOLUTION SUBCUTANEOUS at 18:28

## 2018-09-10 RX ADMIN — METRONIDAZOLE 500 MG: 500 TABLET ORAL at 01:00

## 2018-09-10 RX ADMIN — MAGNESIUM OXIDE TAB 400 MG (241.3 MG ELEMENTAL MG) 400 MG: 400 (241.3 MG) TAB at 08:43

## 2018-09-10 RX ADMIN — GABAPENTIN 100 MG: 100 CAPSULE ORAL at 08:43

## 2018-09-10 RX ADMIN — MAGNESIUM OXIDE TAB 400 MG (241.3 MG ELEMENTAL MG) 400 MG: 400 (241.3 MG) TAB at 18:29

## 2018-09-10 RX ADMIN — HYDROMORPHONE HYDROCHLORIDE 1 MG: 1 INJECTION, SOLUTION INTRAMUSCULAR; INTRAVENOUS; SUBCUTANEOUS at 11:52

## 2018-09-10 RX ADMIN — AMLODIPINE BESYLATE 10 MG: 10 TABLET ORAL at 08:43

## 2018-09-10 RX ADMIN — LEVETIRACETAM 750 MG: 750 TABLET, FILM COATED ORAL at 08:43

## 2018-09-10 RX ADMIN — HEPARIN SODIUM 2000 UNITS: 1000 INJECTION, SOLUTION INTRAVENOUS; SUBCUTANEOUS at 14:02

## 2018-09-10 RX ADMIN — CEFAZOLIN SODIUM 1000 MG: 1 SOLUTION INTRAVENOUS at 11:45

## 2018-09-10 RX ADMIN — INSULIN GLARGINE 18 UNITS: 100 INJECTION, SOLUTION SUBCUTANEOUS at 08:43

## 2018-09-10 RX ADMIN — OXYCODONE HYDROCHLORIDE 5 MG: 5 TABLET ORAL at 22:50

## 2018-09-10 RX ADMIN — CEFAZOLIN SODIUM 1000 MG: 1 SOLUTION INTRAVENOUS at 23:27

## 2018-09-10 NOTE — CONSULTS
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 [facility])  Active Problems:    Essential hypertension    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 native heart without angina pectoris    Type 2 diabetes mellitus with complication, with long-term current use of insulin ([facility])    Hyperlipidemia    Gastroesophageal reflux disease without esophagitis    Ulcer of toe of left foot ([facility])    Seizure ([facility])    Anemia    PAD (peripheral artery disease) ([facility])    Elevated INR    Hypothyroidism    Rotator cuff disorder, left    Wet gangrene ([facility])      Plan/Recommendations:   Acute on chronic pain  · Acetaminophen 975mg Q8 hours; discontinue PRN Acetaminophen  · Gabapentin 100mg TID  · Reduce Fentanyl patch to 25mcg Q3 days; continue to monitor mental status  · Decrease IV Dilaudid to 0 5mg Q6 hours PRN breakthrough pain  · Oxycodone 5mg Q4 hours PRN severe pain  · Adjust bowel regimen, no recent BM    Continue to monitor mental status, patient appears stuporous/over-medicated at this time  Hold PRN opioids for respiratory depression, sedation or any further changes in mental status  Reviewed with [facility]     History of Present Illness    Admit Date:  9/7/2018  Hospital Day:  3 days  Primary Service:  General Medicine  Attending Provider:  Valente Hylton MD  Physician Requesting Consult: Valente Hylton MD  Reason for Consult / Principal Problem: acute on chronic pain   HPI: Marcello Rodriguez is a [de-identified]y o  year old female who presents with increased swelling, discoloration and drainage from her left toe  Recently admitted August for management of foot wounds; treated with antibiotics for cellulitis  At that time vascular was recommending BKA for which she declined  Vascular and Podiatry following case   Plan is for BYPASS POPLITEAL-TIBIAL  Exploration of Plantar artery with possible popliteal to plantar artery bypass with GSV, possible contralateral leg vein harvest on 9/12/18 with vascular and tentative OR on 9/14/18 for left foot ray resection  Review of Systems:  Denied chest pain  Denied abdominal pain  + constipation, unsure of last BM  + nausea  + left foot pain  Denied right foot pain     Pain History:  Pain History: I have reviewed the patient's controlled substance dispensing history in the Prescription Drug Monitoring Program in compliance with the [facility] regulations before recommending any controlled substances  PCP prescribing Fentanyl patch and Oxycodone 5mg PRN, also had prescription for Oxycodone 15mg tablets  States she does not remember taking Oxycodone at home for pain  Current pain location(s): left foot  Pain Scale:   0-10  Severity:  severe  Quality: unable to describe, states "it just hurts"  Treatment History:  Patient resting in bed, states she feels nauseated  When asked questions about her pain she states "she doesn't know " States she does not feel herself and sleeps a lot  Left foot pain is present, states she does not recall having a pain patch placed and she is not sure if the medications are helping her  Visualized Fentanyl patch on left upper arm       Current Analgesic regimen:  Fentanyl 50mcg patch, Gabapentin 100mg TID, Tylenol 650mg Q6 hours PRN one dose), Dilaudid 1mg IV Q4 hours PRN (5 doses)  Bowel Regimen: Colace BID    Historical Information   Past Medical History:   Diagnosis Date    Altered mental status     Anemia     Anticoagulated     Xarelto for Afib/ flutter    Asthma     Atrial flutter (HCC)     maintained on anticoag w/ Xarelto    Bradycardia     CAD (coronary artery disease)     Candidiasis     Carotid stenosis, bilateral     Cataract     Chest pain     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) (HCC)     Chronic fatigue syndrome     Chronic low back pain     Chronic ulcer of toes (HCC)     left and right    Concussion w loss of consciousness of unsp duration, init     [facility] 4[facility]17/2018    Diabetes mellitus ([facility] )     type 2, insulin dependent    DJD (degenerative joint disease)     Expressive aphasia     Foot pain     GERD (gastroesophageal reflux disease)     Herpes zoster     HLD (hyperlipidemia)     HTN (hypertension)     Hypothyroidism     Irritable bowel syndrome     Kidney stone     Lumbar radiculopathy     Lyme disease     Dx in hospital 8/2011    MI [facility]myocardial infarction) [facility]     Migraines     Muscle spasm     Non-neoplastic nevus     Nontoxic multinodular goiter     NSVT (nonsustained ventricular tachycardia) (HCC)     Osteoarthritis     Other chronic pain     PAD (peripheral artery disease) (HCC)     Palpitations     Paroxysmal atrial fibrillation (HCC)     Pseudogout     Pulmonary hypertension (HCC)     S/P TAVR (transcatheter aortic valve replacement)     Seizures (HCC)     Severe sepsis ([facility]     Spinal stenosis     Stroke ([place] [id number] ) 05/2018    Transient cerebral ischemia     Trigger ring finger     Viral gastroenteritis      Past Surgical History:   Procedure Laterality Date    APPENDECTOMY      ARTERIOGRAM  12/19/2017    CARDIAC CATHETERIZATION      CHOLECYSTECTOMY      COLONOSCOPY      CORONARY ARTERY BYPASS GRAFT  2004    IR ABDOMINAL ANGIOGRAPHY / INTERVENTION  8/10/2018    IR ABDOMINAL ANGIOGRAPHY / INTERVENTION  8/21/2018    LUMBAR LAMINECTOMY      AK ECHO TRANSESOPHAG R-T 2D W/PRB IMG ACQUISJ I&R N/A 4/3/2018    Procedure: TRANSESOPHAGEAL ECHOCARDIOGRAM (ROBB);   Surgeon: Paula Rutledge DO;  [facility]: [facility];  Service: Cardiac Surgery    AK REPLACE AORTIC VALVE OPENFEMORAL ARTERY APPROACH N/A 4/3/2018    Procedure: REPLACEMENT AORTIC VALVE TRANSCATHETER (TAVR) TRANSFEMORAL w/ 26MM IVY YEVGENIY S3 VALVE;  Surgeon: Deepika Mars DO Kerri;  Location: [facility];  Service: Cardiac Surgery    THYROIDECTOMY      TOTAL ABDOMINAL HYSTERECTOMY W/ BILATERAL SALPINGOOPHORECTOMY       Social History   History   Alcohol Use No     History   Drug Use No     History   Smoking Status    Never Smoker   Smokeless Tobacco    Never Used     Family History: non-contributory    Meds/Allergies   Prior to Admission Medications  Prescriptions Prior to Admission   Medication    acetaminophen (TYLENOL) 650 mg CR tablet    albuterol (VENTOLIN HFA) 90 mcg/act inhaler    amiodarone 200 mg tablet    amLODIPine (NORVASC) 10 mg tablet    atorvastatin (LIPITOR) 80 mg tablet    Cholecalciferol (VITAMIN D3) 1000 units CHEW    docusate sodium (COLACE) 100 mg capsule    fentaNYL (DURAGESIC) 25 mcg/hr    ferrous sulfate 325 (65 Fe) mg tablet    fluticasone (FLONASE) 50 mcg/act nasal spray    furosemide (LASIX) 40 mg tablet    gabapentin (NEURONTIN) 100 mg capsule    glucose blood test strip    Insulin Syringe-Needle U-100 (BD INSULIN SYRINGE ULTRAFINE) 31G X 5/16" 1 ML MISC    LANTUS 100 UNIT/ML subcutaneous injection    levETIRAcetam (KEPPRA) 750 mg tablet    levothyroxine 100 mcg tablet    lisinopril (ZESTRIL) 10 mg tablet    loratadine (CLARITIN) 10 mg tablet    magnesium oxide (MAG-OX) 400 mg    metFORMIN (GLUCOPHAGE) 1000 MG tablet    metoclopramide (REGLAN) 10 mg tablet    Mometasone Furoate (ASMANEX HFA) 100 MCG/ACT AERO    Nutritional Supplements (UTYMAX PO)    omeprazole (PriLOSEC) 20 mg delayed release capsule    oxyCODONE (ROXICODONE) 5 mg immediate release tablet    polyethylene glycol (MIRALAX) 17 g packet    potassium chloride (K-DUR,KLOR-CON) 10 mEq tablet    rivaroxaban (XARELTO) 20 mg tablet    saccharomyces boulardii (FLORASTOR) 250 mg capsule     Hospital Medications  Current Facility-Administered Medications   Medication Dose Route Frequency    acetaminophen (TYLENOL) tablet 650 mg  650 mg Oral Q6H PRN    albuterol (PROVENTIL HFA,VENTOLIN HFA) inhaler 1 puff  1 puff Inhalation Q4H PRN    amiodarone tablet 200 mg  200 mg Oral BID    amLODIPine (NORVASC) tablet 10 mg  10 mg Oral Daily    atorvastatin (LIPITOR) tablet 80 mg  80 mg Oral Daily    ceFAZolin (ANCEF) IVPB (premix) 1,000 mg  1,000 mg Intravenous Q12H    cholecalciferol (VITAMIN D3) tablet 1,000 Units  1,000 Units Oral Daily    docusate sodium (COLACE) capsule 100 mg  100 mg Oral BID    fentaNYL (DURAGESIC) 50 mcg/hr TD 72 hr patch 50 mcg  50 mcg Transdermal Q72H    fluticasone (FLOVENT HFA) 110 MCG/ACT inhaler 1 puff  1 puff Inhalation Q12H [facility]    furosemide (LASIX) tablet 40 mg  40 mg Oral Daily    gabapentin (NEURONTIN) capsule 100 mg  100 mg Oral TID    heparin (porcine) 25,000 units in 250 mL infusion (premix)  3-20 Units/kg/hr (Order-Specific) Intravenous Titrated    heparin (porcine) injection 2,000 Units  2,000 Units Intravenous PRN    heparin (porcine) injection 4,000 Units  4,000 Units Intravenous PRN    HYDROmorphone (DILAUDID) injection 1 mg  1 mg Intravenous Q4H PRN    insulin glargine (LANTUS) subcutaneous injection 18 Units 0 18 mL  18 Units Subcutaneous BID    insulin lispro (HumaLOG) 100 units/mL subcutaneous injection 1-5 Units  1-5 Units Subcutaneous TID AC    insulin lispro (HumaLOG) 100 units/mL subcutaneous injection 1-5 Units  1-5 Units Subcutaneous HS    labetalol (NORMODYNE) injection 5 mg  5 mg Intravenous Q6H PRN    levETIRAcetam (KEPPRA) tablet 750 mg  750 mg Oral Daily    levothyroxine tablet 100 mcg  100 mcg Oral Early Morning    magnesium oxide (MAG-OX) tablet 400 mg  400 mg Oral BID    metroNIDAZOLE (FLAGYL) tablet 500 mg  500 mg Oral Q8H [facility]       Allergies   Allergen Reactions    Other Chest Pain     IVP-listed as "chest pain" in previous chart, patient stated this occurred "a long time ago" but does not remember exactly occurred     Contrast [Iodinated Diagnostic Agents] Other (See Comments)     Flash pulm edema  Doxycycline Chest Pain    Ketorolac Other (See Comments)     Chest pain     Levaquin [Levofloxacin] Chest Pain    Ondansetron Chest Pain     Prolonged QT    Toradol [Ketorolac Tromethamine] Chest Pain       Objective   Temp:  [98 °F (36 7 °C)-98 9 °F (37 2 °C)] 98 °F (36 7 °C)  HR:  [61-67] 66  Resp:  [18] 18  BP: (129-163)/(54-72) 136/64    Intake/Output Summary (Last 24 hours) at 09/10/18 1435  Last data filed at 09/10/18 1204   Gross per 24 hour   Intake              410 ml   Output              700 ml   Net             -290 ml     Physical Exam:  General Appearance:    Alert, cooperative, no distress   Neurological:   Oriented, forgetful    Head:    Normocephalic, without obvious abnormality, atraumatic   Eyes:    EOM's intact   Back:     Symmetric, no curvature   Lungs:     Decreased, Clear to auscultation bilaterally, respirations unlabored   Chest Wall:    No tenderness or deformity   Abdomen:        Soft, no distention or tenderness, bowel sounds hypoactive     Heart:    Regular rate   Extremities:   Extremities mild LE/Pedal edema B/l; intact sensation to light touch   Skin:   Skin color pale, left foot erythema with eschar present/gangrene, right foot eschar      Lab Results:   Results from last 7 days  Lab Units 09/10/18  0435   WBC Thousand/uL 11 29*   HEMOGLOBIN g/dL 7 6*   HEMATOCRIT % 26 1*   PLATELETS Thousands/uL 297      Results from last 7 days  Lab Units 09/10/18  0435  09/08/18  0518   SODIUM mmol/L 135*  < > 136   POTASSIUM mmol/L 4 5  < > 4 6   CHLORIDE mmol/L 103  < > 105   CO2 mmol/L 26  < > 24   BUN mg/dL 15  < > 28*   CREATININE mg/dL 0 98  < > 1 09   CALCIUM mg/dL 8 3  < > 8 7   ALK PHOS U/L  --   --  80   ALT U/L  --   --  17   AST U/L  --   --  26   < > = values in this interval not displayed  Imaging Studies: I have personally reviewed pertinent reports  Counseling / Coordination of Care  Total floor / unit time spent today 30 minutes   Greater than 50% of total time was spent with the patient and / or family counseling and / or coordination of care   A description of the counseling / coordination of care: Reviewed plan of care and medications with patient, RN staff and primary care team     Vero Russo MS, RN-BC  Acute Pain

## 2018-09-10 NOTE — PROGRESS NOTES
Progress Note - Vascular Surgery   Vi Rapheal [de-identified] y o  female MRN: [id number]  Unit/Bed#: -01 Encounter: [id number]    Assessment/Plan:  Vi Raphael is a [de-identified] y o  female with history dry gangrene left great toe who presents with fever, leukocytosis, increased discoloration of left great toe, and fluid discharge from the base of her left great toe which is concerning for wet gangrene  In addition she has dopplerable pedal pulses and a therapeutic level of INR at 3 04     -Continue heparin gtt  -Plan for surgery on 9/12 [facility], will need to be transferred 9/11 unless OR availability here at [facility]  -Continue antibiotics  -[address], will likely need intervention s/p revascularization  -Medical management per primary team    Subjective/Objective     Subjective: No acute events overnight  Patient doing well this morning, still having same foot pain, medication helps some  No new complaints  Objective:     Vitals: Blood pressure 129/54, pulse 61, temperature 98 2 °F (36 8 °C), temperature source Oral, resp  rate 18, height 5' 4" (1 626 m), weight 73 kg (160 lb 15 oz), SpO2 92 %, not currently breastfeeding  ,Body mass index is 27 62 kg/m²  I/O       09/07 0701 - 09/08 0700 09/08 0701 - 09/09 0700 09/09 0701 - 09/10 0700    P  O   400     IV Piggyback 500      Total Intake(mL/kg) 500 (7) 400 (5 5)     Stool  1     Total Output   1      Net +500 +399             Unmeasured Urine Occurrence 4 x 3 x         Physical Exam   Constitutional: She is oriented to person, place, and time  She appears well-developed and well-nourished  HENT:   Head: Normocephalic and atraumatic  Mouth/Throat: Oropharynx is clear and moist    Eyes: Conjunctivae and EOM are normal  Pupils are equal, round, and reactive to light  Neck: Normal range of motion  Neck supple  No JVD present  Cardiovascular: Normal rate, regular rhythm and intact distal pulses      Pulses:  B/l DP signals  B/l PT signals  Pulmonary/Chest: Effort normal and breath sounds normal  No respiratory distress  Abdominal: Soft  She exhibits no distension  There is no tenderness  Musculoskeletal: Normal range of motion  She exhibits no edema  Neurological: She is alert and oriented to person, place, and time  Skin: Skin is warm       Lab, Imaging and other studies:   Lab Results   Component Value Date    GLUCOSE 220 (H) 04/29/2018    CALCIUM 8 5 09/09/2018     09/09/2018    K 4 6 09/09/2018    CO2 27 09/09/2018     09/09/2018    BUN 19 09/09/2018    CREATININE 0 97 09/09/2018     Lab Results   Component Value Date    WBC 9 89 09/09/2018    HGB 8 4 (L) 09/09/2018    HCT 28 4 (L) 09/09/2018    MCV 81 (L) 09/09/2018     09/09/2018     Lab Results   Component Value Date    INR 1 52 (H) 09/09/2018    INR 1 77 (H) 09/08/2018    INR 2 26 (H) 09/07/2018    PROTIME 18 4 (H) 09/09/2018    PROTIME 20 7 (H) 09/08/2018    PROTIME 25 0 (H) 09/07/2018       VTE Pharmacologic Prophylaxis: Currently on Xarelto  VTE Mechanical Prophylaxis: reason for no mechanical VTE prophylaxis , lower extremity wounds

## 2018-09-10 NOTE — PROGRESS NOTES
09/10/18 [address] Involvement Patient not active with [employer]   Coping Responses   Patient Coping Open/discussion   Plan of Care   Comments Cultivated a relationship of care and support  Provided chaplaincy education  PT expressed appreciation of humor

## 2018-09-10 NOTE — PROGRESS NOTES
[facility] - Internal Medicine Progress Note      PATIENT INFORMATION      Patient: Judge Haley [de-identified] y o  female   MRN: [id number]  PCP: Comfort Drake MD  Unit/Bed#: [facility] Encounter: [id number]  Date Of Visit: 09/10/18  Current Length of Stay: 3 day(s)     ASSESSMENTS & PLAN        1   Severe peripheral vascular disease  Involving both feet but mainly left great toe   Angio from 08/21/2018 shows severe multivessel disease in the left calf, patent left superficial femoral and popliteal arteries with diffuse non flow limiting disease  · Vascular following:  [facility]  so we [facility] on 09/11  · Podiatry following:  No emergent indication for surgery, plan for intervention after bypass  Previous wound cultures showed Staph aureus  Wound cx growing 3+ gram negative rods  · Consult for Hyperbaric Oxygen placed by Podiatry  · Cont Ancef/Flagyl  · X-ray shows no signs of osteomyelitis  · Started on diet as no plan for surgery currently  · Analgesia:  Dilaudid PRN plus fentanyl patch     2   History of DVT/PE  Lower extremity duplex on 08/24/2018 showed nonocclusive thrombus in 1 of the paired peroneal veins in the proximal and distal calf on left side, no thrombus in right limb   On Xarelto chronically for anticoagulation  · Heparin gtt    · Hold SCDs in setting of chronic lower extremity wounds     3   Coronary artery disease status post CABG  · Continue atorvastatin/aspirin  · P r n  IV labetalol     4   Paroxysmal AFib  · Continue amiodarone 20 mg b i d  rate control  · Anticoagulation as above     5   Chronic systolic heart failure (without exacerbation)  · Lasix/lisinopril held in setting of LEONARD, resolved   Will discuss restarting Lasix during rounds         6   Elevated INR secondary to her Xarelto  · Anticoagulation as above     7   Hypertension  · Continue Amlodipine  · Lisinopril held due to acute kidney injury  · IV labetalol 5 mg q 6 hours p r n      8   Acute kidney injury  · Resolving  · Restart Lasix  · Cont holding Lisinopril     9   Type 2 diabetes mellitus  · Lantus and sliding scale insulin    VTE Pharmacologic Prophylaxis: Heparin   VTE Mechanical Prophylaxis: SCD's      SUBJECTIVE     Patient appears comfortable but complains of left great toe pain  Nurse reported patient removing dressing  Otherwise no acute complaints  Patient denies chest pain, palpitations, SOB, cough, abdominal pain, nausea, vomiting, constipation, diarrhea, fevers/chills, headaches, dysuria  OBJECTIVE     Vitals:   Temp (24hrs), Av 3 °F (36 8 °C), Min:98 °F (36 7 °C), Max:98 9 °F (37 2 °C)    HR:  [61-67] 61  Resp:  [18] 18  BP: (129-163)/(54-72) 129/54  SpO2:  [91 %-96 %] 92 %  Body mass index is 27 62 kg/m²  Input and Output Summary (last 24 hours): Intake/Output Summary (Last 24 hours) at 09/10/18 0715  Last data filed at 09/10/18 0030   Gross per 24 hour   Intake              640 ml   Output             1200 ml   Net             -560 ml       Physical Exam:   GENERAL: NAD  HEENT:  NC/AT, PERRL, EOMI, MMM, no scleral icterus  CARDIAC:  RRR, +S1/S2, no S3/S4 heard, no m/g/r  PULMONARY:  CTA B/L, no wheezing/rales/rhonci, non-labored breathing  ABDOMEN:  Soft, NT/ND, +BS, no rebound/guarding/rigidity  Extremities:  Cyanotic/gangrenous left great toe  Stable  ADDITIONAL DATA     Labs & Recent Cultures:       Results from last 7 days  Lab Units 09/10/18  0435  18  0518   WBC Thousand/uL 11 29*  < > 10 53*   HEMOGLOBIN g/dL 7 6*  < > 7 7*   HEMATOCRIT % 26 1*  < > 26 4*   PLATELETS Thousands/uL 297  < > 342   NEUTROS PCT %  --   --  70   LYMPHS PCT %  --   --  16   MONOS PCT %  --   --  10   EOS PCT %  --   --  2   < > = values in this interval not displayed      Results from last 7 days  Lab Units 09/10/18  0435  18  0518   SODIUM mmol/L 135*  < > 136   POTASSIUM mmol/L 4 5  < > 4 6   CHLORIDE mmol/L 103  < > 105   CO2 mmol/L 26  < > 24   BUN mg/dL 15 < > 28*   CREATININE mg/dL 0 98  < > 1 09   CALCIUM mg/dL 8 3  < > 8 7   ALK PHOS U/L  --   --  80   ALT U/L  --   --  17   AST U/L  --   --  26   < > = values in this interval not displayed  Results from last 7 days  Lab Units 09/09/18  1157   INR  1 52*           Results from last 7 days  Lab Units 09/09/18  1659   GRAM STAIN RESULT  No polys seen  3+ Gram negative rods  1+ Gram positive cocci in pairs   WOUND CULTURE  Culture results to follow           Last 24 Hours Medication List:     Current Facility-Administered Medications:  acetaminophen 650 mg Oral Q6H PRN Brian Joe DO    albuterol 1 puff Inhalation Q4H PRN Sona Guevara MD    amiodarone 200 mg Oral BID Sona Guevara MD    amLODIPine 10 mg Oral Daily Sona Guevara MD    atorvastatin 80 mg Oral Daily Sona Guevara MD    cefazolin 1,000 mg Intravenous Q12H Sona Guevara MD Last Rate: Stopped (09/10/18 0030)   cholecalciferol 1,000 Units Oral Daily Sona Guevara MD    docusate sodium 100 mg Oral BID Sona Guevara MD    fentaNYL 50 mcg Transdermal Riya Batres MD    fluticasone 1 puff Inhalation Q12H [facility] Sona Guevara MD    furosemide 40 mg Oral Daily Davis Kinsey MD    gabapentin 100 mg Oral TID Sona Guevara MD    heparin (porcine) 3-20 Units/kg/hr (Order-Specific) Intravenous Titrated Carlton Kinsey MD Last Rate: 12 Units/kg/hr (09/10/18 0600)   heparin (porcine) 2,000 Units Intravenous PRN Carlton Kinsey MD    heparin (porcine) 4,000 Units Intravenous PRN Carlton Kinsey MD    HYDROmorphone 1 mg Intravenous Q4H PRN Latricia Mendoza MD    insulin glargine 18 Units Subcutaneous BID Sona Guevara MD    insulin lispro 1-5 Units Subcutaneous TID AC Dinosaur Joe, DO    insulin lispro 1-5 Units Subcutaneous HS Dinosaur Joe, DO    labetalol 5 mg Intravenous Q6H PRN Brian Diaz DO    levETIRAcetam 750 mg Oral Daily Sona Guevara MD    levothyroxine 100 mcg Oral Early Morning Sona Guevara MD    magnesium oxide 400 mg Oral BID Sona Guevara MD metroNIDAZOLE 500 mg Oral [facility] Nael Malhotra DPM           Time Spent for Care: 30 mins spent in total   More than 50% of total time spent on counseling and coordination of care as described above  Current Length of Stay: 3 day(s)      Code Status: Level 3 - DNAR and DNI          ** Please Note: This note is constructed using a voice recognition dictation system   **

## 2018-09-10 NOTE — PROGRESS NOTES
Resident with [facility] made aware again patient continues to remove dressings b/l feet and refusing to have them put back on  Will see patient

## 2018-09-10 NOTE — CONSULTS
Vascular Surgery    Please see consultation note from 9/7 by Dr Robert Salgado and attested by Dr Gustabo Nuñez  It was mistakenly labeled as Colorectal service      Carmen Washington PA-C  9/10/2018

## 2018-09-10 NOTE — PHYSICIAN ADVISOR
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 ([facility] [address] ) [I70 269]       There is documentation in the medical record of an expected length of stay of at least 2 midnights  The patient is therefore expected to satisfy the 2 midnight benchmark and given the 2 midnight presumption is appropriate for INPATIENT ADMISSION  Given this expectation of a satisfying stay, CMS instructs us that the patient is most often appropriate for inpatient admission under part A provided medical necessity is documented in the chart  After review of the relevant documentation, labs, vital signs and test results, the patient is appropriate for INPATIENT ADMISSION  Admission to the hospital as an inpatient is a complex decision making process which requires the practitioner to consider the patients presenting complaint, history and physical examination and all relevant testing  With this in mind, in this case, the patient was deemed appropriate for INPATIENT ADMISSION  After review of the documentation and testing available at the time of the admission I concur with this clinical determination of medical necessity  The patient does have an inpatient admission within the previous 30 days  The patient was admitted on 8/8/18 and discharged on 8/24/18 as an inpatient  The patient therefore required readmission review  Rationale is as follows:     The patient is a [de-identified] yrs   Female who presented to the ED at 9/7/2018 12:34 PM with a chief complaint of Foot Ulcer (PER ems, "FROM [facility], SENT FOR EVAL OF BILAT TOE INFECTIONS, ONGOING PROBLEM") Patient admitted with a report of increasing pain to her left great toe  It was noted that she has cellulitis and wet gangrene of that digit  She was recently hospitalized for left foot cellulitis and possible osteomyelitis of the the digit  There was discussion regarding a BKA but watchful waiting was the plan of action  On this admission, she was again seen by Vascular and Podiatry  The present course of action is to have revascularization done first and then most likely surgery will be indicated (possible BKA)  It would be appropriate to consider this admission as RELATED to the previous admission      The patients vitals on arrival were ED Triage Vitals [09/07/18 1242]   Temperature Pulse Respirations Blood Pressure SpO2   98 2 °F (36 8 °C) 61 18 130/57 91 %      Temp Source Heart Rate Source Patient Position - Orthostatic VS BP Location FiO2 (%)   Oral Monitor Sitting Left arm --      Pain Score       Worst Possible Pain           Past Medical History:   Diagnosis Date    Altered mental status     Anemia     Anticoagulated     Xarelto for Afib/ flutter    Asthma     Atrial flutter (HCC)     maintained on anticoag w/ Xarelto    Bradycardia     CAD (coronary artery disease)     Candidiasis     Carotid stenosis, bilateral     Cataract     Chest pain     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) (HCC)     Chronic fatigue syndrome     Chronic low back pain     Chronic ulcer of toes (HCC)     left and right    Concussion w loss of consciousness of unsp duration, init     CVA (cerebral vascular accident) ([facility] [address] ) 4/17/2018    Diabetes mellitus ([facility] [address] )     type 2, insulin dependent    DJD (degenerative joint disease)     Expressive aphasia     Foot pain     GERD (gastroesophageal reflux disease)     Herpes zoster     HLD (hyperlipidemia)     HTN (hypertension)     Hypothyroidism     Irritable bowel syndrome     Kidney stone     Lumbar radiculopathy     Lyme disease     Dx in hospital 8/2011    MI (myocardial infarction) ([facility] [address] )     Migraines     Muscle spasm     Non-neoplastic nevus     Nontoxic multinodular goiter     NSVT (nonsustained ventricular tachycardia) (HCC)     Osteoarthritis     Other chronic pain     PAD (peripheral artery disease) (HCC)     Palpitations     Paroxysmal atrial fibrillation (HCC)     Pseudogout     Pulmonary hypertension (HCC)     S/P TAVR (transcatheter aortic valve replacement)     Seizures (HCC)     Severe sepsis ([facility] [address] )     Spinal stenosis     Stroke ([facility] ) 05/2018    Transient cerebral ischemia     Trigger ring finger     Viral gastroenteritis      Past Surgical History:   Procedure Laterality Date    APPENDECTOMY      ARTERIOGRAM  12/19/2017    CARDIAC CATHETERIZATION      CHOLECYSTECTOMY      COLONOSCOPY      CORONARY ARTERY BYPASS GRAFT  2004    IR ABDOMINAL ANGIOGRAPHY / INTERVENTION  8/10/2018    IR ABDOMINAL ANGIOGRAPHY / INTERVENTION  8/21/2018    LUMBAR LAMINECTOMY      MO ECHO TRANSESOPHAG R-T 2D W/PRB IMG ACQUISJ I&R N/A 4/3/2018    Procedure: TRANSESOPHAGEAL ECHOCARDIOGRAM (ROBB);   Surgeon: Alma Guillen DO;  Location: [facility];  Service: Cardiac Surgery    MO REPLACE AORTIC VALVE OPENFEMORAL ARTERY APPROACH N/A 4/3/2018    Procedure: REPLACEMENT AORTIC VALVE TRANSCATHETER (TAVR) TRANSFEMORAL w/ 26MM IVY YEVGENIY S3 VALVE;  Surgeon: Alma Guillen DO;  Location: [facility];  Service: Cardiac Surgery    THYROIDECTOMY      TOTAL ABDOMINAL HYSTERECTOMY W/ BILATERAL SALPINGOOPHORECTOMY             Consults have been placed to:   IP CONSULT TO VASCULAR SURGERY  IP CONSULT TO PODIATRY  INPATIENT CONSULT TO HYPERBARIAC    Vitals:    09/09/18 2310 09/10/18 0335 09/10/18 0445 09/10/18 0735   BP: 144/57 129/54  136/64   BP Location: Left arm Left arm  Left arm   Pulse: 67 61  66   Resp: 18 18 18   Temp: 98 9 °F (37 2 °C) 98 2 °F (36 8 °C)  98 °F (36 7 °C)   TempSrc: Oral Oral  Oral   SpO2: 92% 92%  93%   Weight:   73 kg (160 lb 15 oz)    Height:           Most recent labs:    Recent Labs      09/08/18   0518   09/09/18   1157  09/10/18   0435   WBC  10 53*   < >  9 89  11 29*   HGB  7 7*   < >  8 4*  7 6*   HCT  26 4*   < >  28 4*  26 1*   PLT  342   < >  317  297   K  4 6   < >   --   4 5   NA  136   < >   --   135*   CALCIUM  8 7   < >   --   8 3   BUN  28*   < >   --   15   CREATININE  1 09   < >   --   0 98   INR  1 77*   --   1 52*   --    AST  26   --    --    --    ALT  17   --    --    --    ALKPHOS  80   --    --    --     < > = values in this interval not displayed         Scheduled Meds:  Current Facility-Administered Medications:  acetaminophen 650 mg Oral Q6H PRN Kristy Nuñez DO    albuterol 1 puff Inhalation Q4H PRN Aleshia Pisano MD    amiodarone 200 mg Oral BID Aleshia Pisano MD    amLODIPine 10 mg Oral Daily Aleshia Pisano MD    atorvastatin 80 mg Oral Daily Aleshia Pisano MD    cefazolin 1,000 mg Intravenous Q12H Aleshia Pisano MD Last Rate: Stopped (09/10/18 0030)   cholecalciferol 1,000 Units Oral Daily Aleshia Pisano MD    docusate sodium 100 mg Oral BID Aleshia Pisano MD    fentaNYL 50 mcg Transdermal Casper Collier MD    fluticasone 1 puff Inhalation Q12H [facility] Aleshia Pisano MD    furosemide 40 mg Oral Daily Davis Kinsey MD    gabapentin 100 mg Oral TID Aleshia Pisano MD    heparin (porcine) 3-20 Units/kg/hr (Order-Specific) Intravenous Titrated Carlton Kinsey MD Last Rate: 12 Units/kg/hr (09/10/18 0600)   heparin (porcine) 2,000 Units Intravenous PRN Carlton Kinsey MD    heparin (porcine) 4,000 Units Intravenous PRN Carlton Kinsey MD    HYDROmorphone 1 mg Intravenous Q4H PRN Trey Argueta MD    insulin glargine 18 Units Subcutaneous BID Aleshia Pisano MD    insulin lispro 1-5 Units Subcutaneous TID AC Kristy Nuñez DO    insulin lispro 1-5 Units Subcutaneous HS Kristy Nuñez DO    labetalol 5 mg Intravenous Q6H PRN Kristy Nuñez DO    levETIRAcetam 750 mg Oral Daily Aleshia Pisano MD levothyroxine 100 mcg Oral Early Morning Milla Mcgill MD    magnesium oxide 400 mg Oral BID Milla Mcgill MD    metroNIDAZOLE 500 mg Oral [facility] Edward Cisneros DPM      Continuous Infusions:  heparin (porcine) 3-20 Units/kg/hr (Order-Specific) Last Rate: 12 Units/kg/hr (09/10/18 0600)     PRN Meds:   acetaminophen    albuterol    heparin (porcine)    heparin (porcine)    HYDROmorphone    labetalol    Surgical procedures (if appropriate):

## 2018-09-10 NOTE — PROGRESS NOTES
Notified SOD resident patient having periods of confusion, removing constantly foot dressings, and uncooperative  Patient has periods of agitation  Vitals stable  Also made resident aware patient c/o severe pain in her feet  Patient's feet redressed several times tonight  Patient continues to remove dressings  Education done, however, noncompliant  Will see patient

## 2018-09-10 NOTE — PROGRESS NOTES
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 per vascular surgery  -podiatry see next on 9/13  and do consent for plan to take patient to OR 9/14 for left partial 1st ray amputation pending vascular recommendations  -continue Ancef/Flagyl    Subjective/Objective   Chief Complaint:   Chief Complaint   Patient presents with    Foot Ulcer     PER ems, "FROM [facility], SENT FOR EVAL OF BILAT TOE INFECTIONS, ONGOING PROBLEM"       Subjective: [de-identified] y o  y/o female was seen and evaluated at bedside  Blood pressure 136/64, pulse 66, temperature 98 °F (36 7 °C), temperature source Oral, resp  rate 18, height 5' 4" (1 626 m), weight 73 kg (160 lb 15 oz), SpO2 93 %, not currently breastfeeding  ,Body mass index is 27 62 kg/m²  Invasive Devices     Peripheral Intravenous Line            Peripheral IV 09/07/18 Right Antecubital 2 days    Peripheral IV 09/07/18 Right Forearm 2 days                Physical Exam:   General: Alert, cooperative and no distress  Lungs: Non labored breathing  Heart: Positive S1, S2  Abdomen: Soft, non-tender  Extremity:       Neurovascular status gross motor function at baseline  Left foot continues to be stable without clinical signs infection            Lab, Imaging and other studies:   CBC:   Lab Results   Component Value Date    WBC 11 29 (H) 09/10/2018    HGB 7 6 (L) 09/10/2018    HCT 26 1 (L) 09/10/2018    MCV 81 (L) 09/10/2018     09/10/2018    MCH 23 5 (L) 09/10/2018    MCHC 29 1 (L) 09/10/2018 RDW 18 7 (H) 09/10/2018    MPV 10 1 09/10/2018       Imaging: I have personally reviewed pertinent films in PACS  EKG, Pathology, and Other Studies: I have personally reviewed pertinent reports    VTE Pharmacologic Prophylaxis: Heparin

## 2018-09-11 LAB
ABO GROUP BLD: NORMAL
ANION GAP SERPL CALCULATED.3IONS-SCNC: 8 MMOL/L (ref 4–13)
APTT PPP: 58 SECONDS (ref 24–36)
APTT PPP: 63 SECONDS (ref 24–36)
APTT PPP: 90 SECONDS (ref 24–36)
APTT PPP: 91 SECONDS (ref 24–36)
BACTERIA WND AEROBE CULT: ABNORMAL
BLD GP AB SCN SERPL QL: NEGATIVE
BUN SERPL-MCNC: 15 MG/DL (ref 5–25)
CALCIUM SERPL-MCNC: 8.3 MG/DL (ref 8.3–10.1)
CHLORIDE SERPL-SCNC: 102 MMOL/L (ref 100–108)
CO2 SERPL-SCNC: 26 MMOL/L (ref 21–32)
CREAT SERPL-MCNC: 0.95 MG/DL (ref 0.6–1.3)
ERYTHROCYTE [DISTWIDTH] IN BLOOD BY AUTOMATED COUNT: 19.3 % (ref 11.6–15.1)
GFR SERPL CREATININE-BSD FRML MDRD: 57 ML/MIN/1.73SQ M
GLUCOSE SERPL-MCNC: 101 MG/DL (ref 65–140)
GLUCOSE SERPL-MCNC: 133 MG/DL (ref 65–140)
GLUCOSE SERPL-MCNC: 150 MG/DL (ref 65–140)
GLUCOSE SERPL-MCNC: 151 MG/DL (ref 65–140)
GLUCOSE SERPL-MCNC: 68 MG/DL (ref 65–140)
GRAM STN SPEC: ABNORMAL
HCT VFR BLD AUTO: 26.9 % (ref 34.8–46.1)
HGB BLD-MCNC: 7.9 G/DL (ref 11.5–15.4)
MCH RBC QN AUTO: 23.7 PG (ref 26.8–34.3)
MCHC RBC AUTO-ENTMCNC: 29.4 G/DL (ref 31.4–37.4)
MCV RBC AUTO: 81 FL (ref 82–98)
PLATELET # BLD AUTO: 305 THOUSANDS/UL (ref 149–390)
PMV BLD AUTO: 10.3 FL (ref 8.9–12.7)
POTASSIUM SERPL-SCNC: 3.8 MMOL/L (ref 3.5–5.3)
RBC # BLD AUTO: 3.33 MILLION/UL (ref 3.81–5.12)
RH BLD: POSITIVE
SODIUM SERPL-SCNC: 136 MMOL/L (ref 136–145)
SPECIMEN EXPIRATION DATE: NORMAL
WBC # BLD AUTO: 13.16 THOUSAND/UL (ref 4.31–10.16)

## 2018-09-11 PROCEDURE — 99024 POSTOP FOLLOW-UP VISIT: CPT | Performed by: SURGERY

## 2018-09-11 PROCEDURE — 86901 BLOOD TYPING SEROLOGIC RH(D): CPT | Performed by: PHYSICIAN ASSISTANT

## 2018-09-11 PROCEDURE — 99232 SBSQ HOSP IP/OBS MODERATE 35: CPT | Performed by: INTERNAL MEDICINE

## 2018-09-11 PROCEDURE — 80048 BASIC METABOLIC PNL TOTAL CA: CPT | Performed by: INTERNAL MEDICINE

## 2018-09-11 PROCEDURE — 86923 COMPATIBILITY TEST ELECTRIC: CPT

## 2018-09-11 PROCEDURE — 85730 THROMBOPLASTIN TIME PARTIAL: CPT | Performed by: INTERNAL MEDICINE

## 2018-09-11 PROCEDURE — 86900 BLOOD TYPING SEROLOGIC ABO: CPT | Performed by: PHYSICIAN ASSISTANT

## 2018-09-11 PROCEDURE — 86850 RBC ANTIBODY SCREEN: CPT | Performed by: PHYSICIAN ASSISTANT

## 2018-09-11 PROCEDURE — 82948 REAGENT STRIP/BLOOD GLUCOSE: CPT

## 2018-09-11 PROCEDURE — 85027 COMPLETE CBC AUTOMATED: CPT | Performed by: INTERNAL MEDICINE

## 2018-09-11 PROCEDURE — 99222 1ST HOSP IP/OBS MODERATE 55: CPT | Performed by: PREVENTIVE MEDICINE

## 2018-09-11 RX ORDER — SODIUM PHOSPHATE, DIBASIC AND SODIUM PHOSPHATE, MONOBASIC 7; 19 G/133ML; G/133ML
1 ENEMA RECTAL ONCE AS NEEDED
Status: DISCONTINUED | OUTPATIENT
Start: 2018-09-11 | End: 2018-09-24 | Stop reason: HOSPADM

## 2018-09-11 RX ORDER — BISACODYL 10 MG
10 SUPPOSITORY, RECTAL RECTAL DAILY PRN
Status: DISCONTINUED | OUTPATIENT
Start: 2018-09-11 | End: 2018-09-24 | Stop reason: HOSPADM

## 2018-09-11 RX ORDER — CHLORHEXIDINE GLUCONATE 0.12 MG/ML
15 RINSE ORAL EVERY 12 HOURS SCHEDULED
Status: DISCONTINUED | OUTPATIENT
Start: 2018-09-11 | End: 2018-09-12 | Stop reason: HOSPADM

## 2018-09-11 RX ORDER — ACETAMINOPHEN 325 MG/1
975 TABLET ORAL EVERY 8 HOURS SCHEDULED
Status: DISCONTINUED | OUTPATIENT
Start: 2018-09-11 | End: 2018-09-22

## 2018-09-11 RX ORDER — GABAPENTIN 100 MG/1
200 CAPSULE ORAL 2 TIMES DAILY
Status: DISCONTINUED | OUTPATIENT
Start: 2018-09-11 | End: 2018-09-24 | Stop reason: HOSPADM

## 2018-09-11 RX ORDER — SODIUM CHLORIDE 9 MG/ML
75 INJECTION, SOLUTION INTRAVENOUS CONTINUOUS
Status: DISCONTINUED | OUTPATIENT
Start: 2018-09-12 | End: 2018-09-17

## 2018-09-11 RX ORDER — SODIUM CHLORIDE 9 MG/ML
75 INJECTION, SOLUTION INTRAVENOUS CONTINUOUS
Status: DISCONTINUED | OUTPATIENT
Start: 2018-09-11 | End: 2018-09-11

## 2018-09-11 RX ADMIN — LEVETIRACETAM 750 MG: 750 TABLET, FILM COATED ORAL at 08:42

## 2018-09-11 RX ADMIN — MAGNESIUM OXIDE TAB 400 MG (241.3 MG ELEMENTAL MG) 400 MG: 400 (241.3 MG) TAB at 08:42

## 2018-09-11 RX ADMIN — HEPARIN SODIUM 2000 UNITS: 1000 INJECTION, SOLUTION INTRAVENOUS; SUBCUTANEOUS at 03:57

## 2018-09-11 RX ADMIN — HYDROMORPHONE HYDROCHLORIDE 0.5 MG: 1 INJECTION, SOLUTION INTRAMUSCULAR; INTRAVENOUS; SUBCUTANEOUS at 15:07

## 2018-09-11 RX ADMIN — AMIODARONE HYDROCHLORIDE 200 MG: 200 TABLET ORAL at 17:04

## 2018-09-11 RX ADMIN — CHLORHEXIDINE GLUCONATE 15 ML: 1.2 RINSE ORAL at 12:04

## 2018-09-11 RX ADMIN — OXYCODONE HYDROCHLORIDE 5 MG: 5 TABLET ORAL at 21:08

## 2018-09-11 RX ADMIN — LEVOTHYROXINE SODIUM 100 MCG: 100 TABLET ORAL at 05:31

## 2018-09-11 RX ADMIN — FLUTICASONE PROPIONATE 1 PUFF: 110 AEROSOL, METERED RESPIRATORY (INHALATION) at 21:09

## 2018-09-11 RX ADMIN — INSULIN GLARGINE 18 UNITS: 100 INJECTION, SOLUTION SUBCUTANEOUS at 08:42

## 2018-09-11 RX ADMIN — AMIODARONE HYDROCHLORIDE 200 MG: 200 TABLET ORAL at 08:42

## 2018-09-11 RX ADMIN — POLYETHYLENE GLYCOL 3350 17 G: 17 POWDER, FOR SOLUTION ORAL at 08:42

## 2018-09-11 RX ADMIN — CHLORHEXIDINE GLUCONATE 15 ML: 1.2 RINSE ORAL at 21:08

## 2018-09-11 RX ADMIN — ACETAMINOPHEN 975 MG: 325 TABLET ORAL at 21:08

## 2018-09-11 RX ADMIN — MAGNESIUM OXIDE TAB 400 MG (241.3 MG ELEMENTAL MG) 400 MG: 400 (241.3 MG) TAB at 17:04

## 2018-09-11 RX ADMIN — METRONIDAZOLE 500 MG: 500 TABLET ORAL at 08:42

## 2018-09-11 RX ADMIN — FLUTICASONE PROPIONATE 1 PUFF: 110 AEROSOL, METERED RESPIRATORY (INHALATION) at 08:43

## 2018-09-11 RX ADMIN — CEFAZOLIN SODIUM 1000 MG: 1 SOLUTION INTRAVENOUS at 11:54

## 2018-09-11 RX ADMIN — VITAMIN D, TAB 1000IU (100/BT) 1000 UNITS: 25 TAB at 08:42

## 2018-09-11 RX ADMIN — ACETAMINOPHEN 975 MG: 325 TABLET ORAL at 13:52

## 2018-09-11 RX ADMIN — INSULIN GLARGINE 18 UNITS: 100 INJECTION, SOLUTION SUBCUTANEOUS at 17:04

## 2018-09-11 RX ADMIN — OXYCODONE HYDROCHLORIDE 5 MG: 5 TABLET ORAL at 09:44

## 2018-09-11 RX ADMIN — INSULIN LISPRO 1 UNITS: 100 INJECTION, SOLUTION INTRAVENOUS; SUBCUTANEOUS at 11:55

## 2018-09-11 RX ADMIN — ATORVASTATIN CALCIUM 80 MG: 80 TABLET, FILM COATED ORAL at 17:06

## 2018-09-11 RX ADMIN — DICLOFENAC 2 G: 10 GEL TOPICAL at 21:11

## 2018-09-11 RX ADMIN — OXYCODONE HYDROCHLORIDE 5 MG: 5 TABLET ORAL at 05:31

## 2018-09-11 RX ADMIN — GABAPENTIN 100 MG: 100 CAPSULE ORAL at 08:42

## 2018-09-11 RX ADMIN — OXYCODONE HYDROCHLORIDE 5 MG: 5 TABLET ORAL at 13:51

## 2018-09-11 RX ADMIN — FUROSEMIDE 40 MG: 40 TABLET ORAL at 08:42

## 2018-09-11 RX ADMIN — Medication 1 TABLET: at 21:08

## 2018-09-11 RX ADMIN — METRONIDAZOLE 500 MG: 500 TABLET ORAL at 17:04

## 2018-09-11 RX ADMIN — HEPARIN SODIUM AND DEXTROSE 16 UNITS/KG/HR: 10000; 5 INJECTION INTRAVENOUS at 12:12

## 2018-09-11 RX ADMIN — INSULIN LISPRO 1 UNITS: 100 INJECTION, SOLUTION INTRAVENOUS; SUBCUTANEOUS at 17:05

## 2018-09-11 RX ADMIN — GABAPENTIN 200 MG: 100 CAPSULE ORAL at 17:04

## 2018-09-11 RX ADMIN — AMLODIPINE BESYLATE 10 MG: 10 TABLET ORAL at 08:42

## 2018-09-11 RX ADMIN — FENTANYL 25 MCG: 25 PATCH TRANSDERMAL at 17:05

## 2018-09-11 RX ADMIN — BISACODYL 5 MG: 5 TABLET, COATED ORAL at 13:55

## 2018-09-11 NOTE — MEDICAL STUDENT
MEDICAL STUDENT  Inpatient Progress Note for TRAINING ONLY  Not Part of Legal Medical Record       Progress Note - Bertis Councilman [de-identified] y o  female MRN: [id number]    Unit/Bed#: -01 Encounter: [id number]      Assessment:  [de-identified] y/o F with PAD; p/w L first toe dry gangrene    Plan:  Peripheral vascular disease  - Patient has severe PVD leading to gangrene of toes bilaterally  L toe dry gangrene  - OR 9/12 for L popliteal-plantar bypass with GSV   - Continue heparin and Abx as per vascular surgery   - NPO at midnight  Elevated WBC  - Previously evaluated by ID; bacteria which grew on wound culture most likely colonization from hospital stay  - Patient is afebrile and is on Abx  DVT/PE  - Continue heparin  CAD s/p CABG  - Continue atorvostatin/aspirin  Paroxysmal Afib  - Continue amlodipine  Systolic HF  - Lasix and lisinopril were stopped due to LEONARD  - LEONARD is resolving, restart lasix  HTN  - Continue amlodipine, hold lisinopril  LEONARD  - Resolving, continue to hold lisinopril  - Monitor creatinine   DM type 2  - Continue lantus and insulin     Subjective:   Mrs Yoana Plummer is an [de-identified] y F who presented to the hospital with dry gangrene of the 1st L toe  She has an extensive PMHx relevant for PAD, HTN, CAD s/p CABG, type 2 DM, MI, chronic foot ulcers, asthma, CVA, pHTN  She has had no acute events overnight and reports that the pain in her L foot is 8/10, as compared to 10/10 previously  She complains of L sided shoulder pain and decreased appetite  She denies N/V, LE paresthesias, numbness, or tingling  Objective:     Vitals: Blood pressure 148/85, pulse 68, temperature 98 1 °F (36 7 °C), temperature source Oral, resp  rate 18, height 5' 4" (1 626 m), weight 67 1 kg (147 lb 14 9 oz), SpO2 93 %, not currently breastfeeding  ,Body mass index is 25 39 kg/m²        Intake/Output Summary (Last 24 hours) at 09/11/18 0857  Last data filed at 09/11/18 0701   Gross per 24 hour   Intake          1249 29 ml   Output 700 ml   Net           549 29 ml       Physical Exam: General appearance: alert and oriented, in no acute distress and fatigued  Lungs: clear to auscultation bilaterally  Heart: regular rate and rhythm, S1, S2 normal, no murmur, click, rub or gallop  Extremities: extremities normal, warm and well-perfused; no cyanosis, clubbing, or edema and dry gangrene of L first toe; necrosis on R toe as well     Invasive Devices     Peripheral Intravenous Line            Peripheral IV 09/07/18 Right Antecubital 3 days    Peripheral IV 09/07/18 Right Forearm 3 days                Lab, Imaging and other studies:    Recent Results (from the past 24 hour(s))   Fingerstick Glucose (POCT)    Collection Time: 09/10/18 11:00 AM   Result Value Ref Range    POC Glucose 174 (H) 65 - 140 mg/dl   APTT    Collection Time: 09/10/18 12:29 PM   Result Value Ref Range    PTT 57 (H) 24 - 36 seconds   Fingerstick Glucose (POCT)    Collection Time: 09/10/18  4:11 PM   Result Value Ref Range    POC Glucose 94 65 - 140 mg/dl   APTT    Collection Time: 09/10/18  8:33 PM   Result Value Ref Range    PTT 60 (H) 24 - 36 seconds   Fingerstick Glucose (POCT)    Collection Time: 09/10/18  9:25 PM   Result Value Ref Range    POC Glucose 104 65 - 140 mg/dl   APTT    Collection Time: 09/11/18  2:44 AM   Result Value Ref Range    PTT 58 (H) 24 - 36 seconds   Basic metabolic panel    Collection Time: 09/11/18  5:40 AM   Result Value Ref Range    Sodium 136 136 - 145 mmol/L    Potassium 3 8 3 5 - 5 3 mmol/L    Chloride 102 100 - 108 mmol/L    CO2 26 21 - 32 mmol/L    ANION GAP 8 4 - 13 mmol/L    BUN 15 5 - 25 mg/dL    Creatinine 0 95 0 60 - 1 30 mg/dL    Glucose 68 65 - 140 mg/dL    Calcium 8 3 8 3 - 10 1 mg/dL    eGFR 57 ml/min/1 73sq m   CBC    Collection Time: 09/11/18  5:40 AM   Result Value Ref Range    WBC 13 16 (H) 4 31 - 10 16 Thousand/uL    RBC 3 33 (L) 3 81 - 5 12 Million/uL    Hemoglobin 7 9 (L) 11 5 - 15 4 g/dL    Hematocrit 26 9 (L) 34 8 - 46 1 % MCV 81 (L) 82 - 98 fL    MCH 23 7 (L) 26 8 - 34 3 pg    MCHC 29 4 (L) 31 4 - 37 4 g/dL    RDW 19 3 (H) 11 6 - 15 1 %    Platelets 594 466 - 163 Thousands/uL    MPV 10 3 8 9 - 12 7 fL   Fingerstick Glucose (POCT)    Collection Time: 09/11/18  6:42 AM   Result Value Ref Range    POC Glucose 101 65 - 140 mg/dl   Wound culture and Gram stain    Collection Time: 09/11/18  8:45 AM   Result Value Ref Range    Wound Culture 4+ Growth of Pseudomonas aeruginosa (A)     Wound Culture 4+ Growth of Klebsiella oxytoca (A)     Wound Culture 4+ Growth of Staphylococcus aureus (A)     Gram Stain Result No polys seen     Gram Stain Result 3+ Gram negative rods     Gram Stain Result 1+ Gram positive cocci in pairs        Susceptibility    Staphylococcus aureus - YAKELIN     Ampicillin ($$)* <=2 00 Resistant ug/ml      * This is an appended report  These results have been appended to a previously preliminary verified report  Cefazolin ($)* <=4 00 Susceptible ug/ml      * This is an appended report  These results have been appended to a previously preliminary verified report  Clindamycin ($)* <=0 25 Susceptible ug/ml      * This is an appended report  These results have been appended to a previously preliminary verified report  Erythromycin ($$$$)* <=0 25 Susceptible ug/ml      * This is an appended report  These results have been appended to a previously preliminary verified report  Gentamicin ($$)* <=1 Susceptible ug/ml      * This is an appended report  These results have been appended to a previously preliminary verified report  Oxacillin* <=0 25 Susceptible ug/ml      * This is an appended report  These results have been appended to a previously preliminary verified report  Tetracycline* <=2 Susceptible ug/ml      * This is an appended report  These results have been appended to a previously preliminary verified report         Trimethoprim + Sulfamethoxazole ($$$)* <=0 5/9 5 Susceptible ug/ml * This is an appended report  These results have been appended to a previously preliminary verified report  Vancomycin ($)* 1 00 Susceptible ug/ml      * This is an appended report  These results have been appended to a previously preliminary verified report        Klebsiella oxytoca - YAKELIN     ZID Performed Yes       Ampicillin ($$) >16 00 Resistant ug/ml     Ampicillin + Sulbactam ($) 16/8 Intermediate ug/ml     Aztreonam ($$$)  <=4 Susceptible ug/ml     Cefazolin ($) 8 00 Susceptible ug/ml     Ciprofloxacin ($)  <=1 00 Susceptible ug/ml     Ertapenem ($$$) <=0 5 Susceptible ug/ml     Gentamicin ($$) <=1 Susceptible ug/ml     Levofloxacin ($) <=0 25 Susceptible ug/ml     Tetracycline <=4 Susceptible ug/ml     Tobramycin ($) <=1 Susceptible ug/ml     Trimethoprim + Sulfamethoxazole ($$$) <=2/38 Susceptible ug/ml    Pseudomonas aeruginosa - YAKELIN     ZID Performed Yes       Aztreonam ($$$)  8 Susceptible ug/ml     Cefepime ($) <=2 00 Susceptible ug/ml     Ceftazidime ($$) 2 Susceptible ug/ml     Ciprofloxacin ($)  <=1 00 Susceptible ug/ml     Gentamicin ($$) 2 Susceptible ug/ml     Imipenem <=1 Susceptible ug/ml     Levofloxacin ($) 0 50 Susceptible ug/ml     Meropenem ($$) <=1 00 Susceptible ug/ml     Piperacillin + Tazobactam ($$$) <=4 Susceptible ug/ml     Tobramycin ($) <=1 Susceptible ug/ml       VTE Pharmacologic Prophylaxis: Sequential compression device (Venodyne)  and Heparin  VTE Mechanical Prophylaxis: sequential compression device

## 2018-09-11 NOTE — PLAN OF CARE
Problem: Potential for Falls  Goal: Patient will remain free of falls  INTERVENTIONS:  - Assess patient frequently for physical needs  -  Identify cognitive and physical deficits and behaviors that affect risk of falls  -  [place] fall precautions as indicated by assessment   - Educate patient/family on patient safety including physical limitations  - Instruct patient to call for assistance with activity based on assessment  - Modify environment to reduce risk of injury  - Consider OT/PT consult to assist with strengthening/mobility   Outcome: Progressing      Problem: Prexisting or High Potential for Compromised Skin Integrity  Goal: Skin integrity is maintained or improved  INTERVENTIONS:  - Identify patients at risk for skin breakdown  - Assess and monitor skin integrity  - Assess and monitor nutrition and hydration status  - Monitor labs (i e  albumin)  - Assess for incontinence   - Turn and reposition patient  - Assist with mobility/ambulation  - Relieve pressure over bony prominences  - Avoid friction and shearing  - Provide appropriate hygiene as needed including keeping skin clean and dry  - Evaluate need for skin moisturizer/barrier cream  - Collaborate with interdisciplinary team (i e  Nutrition, Rehabilitation, etc )   - Patient/family teaching   Outcome: Progressing      Problem: Nutrition/Hydration-ADULT  Goal: Nutrient/Hydration intake appropriate for improving, restoring or maintaining nutritional needs  Monitor and assess patient's nutrition/hydration status for malnutrition (ex- brittle hair, bruises, dry skin, pale skin and conjunctiva, muscle wasting, smooth red tongue, and disorientation)  Collaborate with interdisciplinary team and initiate plan and interventions as ordered  Monitor patient's weight and dietary intake as ordered or per policy  Utilize nutrition screening tool and intervene per policy   Determine patient's food preferences and provide high-protein, high-caloric foods as appropriate       INTERVENTIONS:  - Monitor oral intake, urinary output, labs, and treatment plans  - Assess nutrition and hydration status and recommend course of action  - Evaluate amount of meals eaten  - Assist patient with eating if necessary   - Allow adequate time for meals  - Recommend/ encourage appropriate diets, oral nutritional supplements, and vitamin/mineral supplements  - Order, calculate, and assess calorie counts as needed  - Recommend, monitor, and adjust tube feedings and TPN/PPN based on assessed needs  - Assess need for intravenous fluids  - Provide specific nutrition/hydration education as appropriate  - Include patient/family/caregiver in decisions related to nutrition   Outcome: Progressing      Problem: DISCHARGE PLANNING - CARE MANAGEMENT  Goal: Discharge to post-acute care or home with appropriate resources  INTERVENTIONS:  - Conduct assessment to determine patient/family and health care team treatment goals, and need for post-acute services based on payer coverage, community resources, and patient preferences, and barriers to discharge  - Address psychosocial, clinical, and financial barriers to discharge as identified in assessment in conjunction with the patient/family and health care team  - Arrange appropriate level of post-acute services according to patient's   needs and preference and payer coverage in collaboration with the physician and health care team  - Communicate with and update the patient/family, physician, and health care team regarding progress on the discharge plan  - Arrange appropriate transportation to post-acute venues   Outcome: Progressing

## 2018-09-11 NOTE — CASE MANAGEMENT
Continued Stay Review    Date: 9/11/2018    Vital Signs: /85 (BP Location: Left arm)   Pulse 68   Temp 98 1 °F (36 7 °C) (Oral)   Resp 18   Ht 5' 4" (1 626 m)   Wt 67 1 kg (147 lb 14 9 oz)   LMP  (LMP Unknown)   SpO2 93%   BMI 25 39 kg/m²     Medications:   Scheduled Meds:   Current Facility-Administered Medications:  acetaminophen 650 mg Oral Q6H PRN Stewart Pastor DO    albuterol 1 puff Inhalation Q4H PRN Kait Sutton MD    amiodarone 200 mg Oral BID Kait Sutton MD    amLODIPine 10 mg Oral Daily Kait Sutton MD    atorvastatin 80 mg Oral Daily Kait Sutton MD    bisacodyl 5 mg Oral Daily PRN Davis Kinsey MD    cefazolin 1,000 mg Intravenous Q12H Kait Sutton MD Last Rate: 1,000 mg (09/10/18 2327)   cefazolin 2,000 mg Intravenous Once Kaushal Sharpe PA-C    chlorhexidine 15 mL Swish & Spit Q12H MAGDALENO Chappell    cholecalciferol 1,000 Units Oral Daily Kait Sutton MD    fentaNYL 25 mcg Transdermal Q72H Davis Kinsey MD    fluticasone 1 puff Inhalation Q12H [address] Kait Sutton MD    furosemide 40 mg Oral Daily Davis Kinsey MD    gabapentin 100 mg Oral TID Kait Sutton MD    heparin (porcine) 3-20 Units/kg/hr (Order-Specific) Intravenous Titrated Carlton Kinsey MD Last Rate: 16 Units/kg/hr (09/11/18 1799)   heparin (porcine) 2,000 Units Intravenous PRN Carlton Kinsey MD    heparin (porcine) 4,000 Units Intravenous PRN Carlton Kinsey MD    HYDROmorphone 0 5 mg Intravenous Q6H PRN Davis Kinsey MD    insulin glargine 18 Units Subcutaneous BID Kait Sutton MD    insulin lispro 1-5 Units Subcutaneous TID AC Stewart Pastor DO    insulin lispro 1-5 Units Subcutaneous HS Stewart Pastor DO    labetalol 5 mg Intravenous Q6H PRN Stewart Pastor DO    levETIRAcetam 750 mg Oral Daily Kait Sutton MD    levothyroxine 100 mcg Oral Early Morning Kait Sutton MD    magnesium oxide 400 mg Oral BID Kait Sutton MD    metroNIDAZOLE 500 mg Oral [facility] Esha Reyes DPM    oxyCODONE 5 mg Oral Q4H PRN Davis Eloisa Angelucci, MD    polyethylene glycol 17 g Oral Daily Davis Kinsey MD    senna-docusate sodium 1 tablet Oral HS Davis Kinsey MD    sodium chloride 75 mL/hr Intravenous Continuous Nasreen Holloway PA-C      Continuous Infusions:   heparin (porcine) 3-20 Units/kg/hr (Order-Specific) Last Rate: 16 Units/kg/hr (09/11/18 0359)   sodium chloride 75 mL/hr      PRN Meds:   acetaminophen    albuterol    bisacodyl    heparin (porcine)    heparin (porcine)    HYDROmorphone    labetalol    oxyCODONE    Abnormal Labs/Diagnostic Results:     Age/Sex: [de-identified] y o  female     Assessment/Plan: Assessment:  [de-identified] y/o F w/ PAD who p/w L 1st toe wet gangrene     Plan:  --OR 9/11 at [facility] for pop-plantar bypass w/ GSV   --OR on 9/14 w/ Podiatry for L partial 1st ray amputation   --NPO@ [place]  --Continue Heparin gtt  --Continue Abx  --Medical management per primary team      kendall Mccormick  Heparin iv  prevalon boot  Npo  Wound culture--  Specimen: Wound from Foot, Left Updated: 09/11/18 0845      Wound Culture 4+ Growth of Pseudomonas aeruginosa (A)     4+ Growth of Klebsiella oxytoca (A)     4+ Growth of Staphylococcus aureus (A)    Gram Stain Result No polys seen     3+ Gram negative rods     1+ Gram positive cocci in pairs         Discharge Plan: home or rehab depending on status at the time

## 2018-09-11 NOTE — PROGRESS NOTES
50 mcg Fentanyl patch removed from L arm  25 mcg Fentanyl patch placed on R arm  50mcg Fentanyl flushed down toilet, witnessed by Michael Howe RN

## 2018-09-11 NOTE — PROGRESS NOTES
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 [facility])  Active Problems:    Essential hypertension    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 native heart without angina pectoris    Type 2 diabetes mellitus with complication, with long-term current use of insulin ([facility])    Hyperlipidemia    Gastroesophageal reflux disease without esophagitis    Ulcer of toe of left foot ([facility])    Seizure ([facility])    Anemia    PAD (peripheral artery disease) ([facility])    Elevated INR    Hypothyroidism    Rotator cuff disorder, left    Wet gangrene ([facility])      Plan/Recommendations:   Acute on chronic pain  · Acetaminophen 975mg Q8 hours; discontinue PRN Acetaminophen  · Change Gabapentin to 200mg BID  · CrCl 49 65  · Fentanyl patch 25mcg Q3 days  · Dilaudid 0 5mg IV Q6 hours PRN breakthrough pain  · Oxycodone 5mg Q4 hours PRN severe pain  · Adjust bowel regimen, no recent BM    Plan for OR tomorrow with pop-plantar bypass with GSV and OR on Friday with podiatry for left partial first ray amputation      Reviewed with Vascular Team    Pain History  Current pain location(s): left foot  Pain Scale:   7-10  Severity:  severe  24 hour history: Patient sitting on side of bed  Appears comfortable at rest  Mental clarity improved today  Left foot pain is present, described as burning, PRN Oxycodone provides mild relief  C/O increased burning pain and redness this am on the LLE  Also c/o constipation and inability to urinate      Current Analgesic regimen:  Fentanyl 25mcg patch, Gabapentin 100mg TID, Tylenol PRN, Dilaudid 0 5mg IV Q6 hours PRN (one dose), Oxycodone 5mg Q4 hours PRN (4 doses)   Bowel Regimen: Miralax daily, Senna-S at HS, Dulcolax PRN      Meds/Allergies   all current active meds have been reviewed and current meds:   Current Facility-Administered Medications   Medication Dose Route Frequency    acetaminophen (TYLENOL) tablet 650 mg  650 mg Oral Q6H PRN    albuterol (PROVENTIL HFA,VENTOLIN HFA) inhaler 1 puff  1 puff Inhalation Q4H PRN    amiodarone tablet 200 mg  200 mg Oral BID    amLODIPine (NORVASC) tablet 10 mg  10 mg Oral Daily    atorvastatin (LIPITOR) tablet 80 mg  80 mg Oral Daily    bisacodyl (DULCOLAX) EC tablet 5 mg  5 mg Oral Daily PRN    ceFAZolin (ANCEF) IVPB (premix) 1,000 mg  1,000 mg Intravenous Q12H    ceFAZolin (ANCEF) IVPB (premix) 2,000 mg  2,000 mg Intravenous Once    chlorhexidine (PERIDEX) 0 12 % oral rinse 15 mL  15 mL Swish & Spit Q12H [address]    cholecalciferol (VITAMIN D3) tablet 1,000 Units  1,000 Units Oral Daily    fentaNYL (DURAGESIC) 25 mcg/hr TD 72 hr patch 25 mcg  25 mcg Transdermal Q72H    fluticasone (FLOVENT HFA) 110 MCG/ACT inhaler 1 puff  1 puff Inhalation Q12H [address]    furosemide (LASIX) tablet 40 mg  40 mg Oral Daily    gabapentin (NEURONTIN) capsule 100 mg  100 mg Oral TID    heparin (porcine) 25,000 units in 250 mL infusion (premix)  3-20 Units/kg/hr (Order-Specific) Intravenous Titrated    heparin (porcine) injection 2,000 Units  2,000 Units Intravenous PRN    heparin (porcine) injection 4,000 Units  4,000 Units Intravenous PRN    HYDROmorphone (DILAUDID) injection 0 5 mg  0 5 mg Intravenous Q6H PRN    insulin glargine (LANTUS) subcutaneous injection 18 Units 0 18 mL  18 Units Subcutaneous BID    insulin lispro (HumaLOG) 100 units/mL subcutaneous injection 1-5 Units  1-5 Units Subcutaneous TID AC    insulin lispro (HumaLOG) 100 units/mL subcutaneous injection 1-5 Units  1-5 Units Subcutaneous HS    labetalol (NORMODYNE) injection 5 mg  5 mg Intravenous Q6H PRN    levETIRAcetam (KEPPRA) tablet 750 mg  750 mg Oral Daily    levothyroxine tablet 100 mcg  100 mcg Oral Early Morning    magnesium oxide (MAG-OX) tablet 400 mg  400 mg Oral BID    metroNIDAZOLE (FLAGYL) tablet 500 mg  500 mg Oral Q8H [address]    oxyCODONE (ROXICODONE) IR tablet 5 mg  5 mg Oral Q4H PRN    polyethylene glycol (MIRALAX) packet 17 g  17 g Oral Daily    senna-docusate sodium (SENOKOT S) 8 6-50 mg per tablet 1 tablet  1 tablet Oral HS    sodium chloride 0 9 % infusion  75 mL/hr Intravenous Continuous       Allergies   Allergen Reactions    Other Chest Pain     IVP-listed as "chest pain" in previous chart, patient stated this occurred "a long time ago" but does not remember exactly occurred     Contrast [Iodinated Diagnostic Agents] Other (See Comments)     Flash pulm edema    Doxycycline Chest Pain    Ketorolac Other (See Comments)     Chest pain     Levaquin [Levofloxacin] Chest Pain    Ondansetron Chest Pain     Prolonged QT    Toradol [Ketorolac Tromethamine] Chest Pain       Objective     Temp:  [98 1 °F (36 7 °C)-99 °F (37 2 °C)] 98 1 °F (36 7 °C)  HR:  [68] 68  Resp:  [18] 18  BP: (113-148)/(47-85) 148/85      Intake/Output Summary (Last 24 hours) at 09/11/18 1002  Last data filed at 09/11/18 0701   Gross per 24 hour   Intake          1249 29 ml   Output              600 ml   Net           649 29 ml     Last Bowel Movement: No recent BM               Physical Exam: /85 (BP Location: Left arm)   Pulse 68   Temp 98 1 °F (36 7 °C) (Oral)   Resp 18   Ht 5' 4" (1 626 m)   Wt 67 1 kg (147 lb 14 9 oz)   LMP  (LMP Unknown)   SpO2 93%   BMI 25 39 kg/m²   General Appearance:    Alert, cooperative, no distress   Neurological:   Oriented to person, place, and time   Head:    Normocephalic, without obvious abnormality, atraumatic   Eyes:    EOM's intact   Back:     Symmetric, no curvature, ROM normal   Lungs:     Respirations unlabored   Chest Wall:    No deformity   Abdomen:        Round   Extremities:   B/L LE mild edema present, b/l foot dressing intact   Skin:   Skin color pale     Lab Results:   Results from last 7 days  Lab Units 09/11/18  0540   WBC Thousand/uL 13 16*   HEMOGLOBIN g/dL 7 9* HEMATOCRIT % 26 9*   PLATELETS Thousands/uL 305      Results from last 7 days  Lab Units 09/11/18  0540  09/08/18  0518   SODIUM mmol/L 136  < > 136   POTASSIUM mmol/L 3 8  < > 4 6   CHLORIDE mmol/L 102  < > 105   CO2 mmol/L 26  < > 24   BUN mg/dL 15  < > 28*   CREATININE mg/dL 0 95  < > 1 09   CALCIUM mg/dL 8 3  < > 8 7   ALK PHOS U/L  --   --  80   ALT U/L  --   --  17   AST U/L  --   --  26   < > = values in this interval not displayed  Counseling / Coordination of Care  Total floor / unit time spent today 15 minutes  Greater than 50% of total time was spent with the patient and / or family counseling and / or coordination of care   A description of the counseling / coordination of care: Reviewed plan of care and medications with patient, RN staff and primary care team     Vero Russo, MS, RN-BC  Acute Pain

## 2018-09-11 NOTE — CONSULTS
Consultation - [address] [de-identified] y o  female MRN: [id number]  Unit/Bed#: -01 Encounter: [id number]    Assessment/Plan     Assessment:  L great toe dry gangrene  R 1st MTP and 3rd toe dry gangrene  DFU, Yoo 4  PAD  CHF    Plan:  Patient has b/l Yoo 4 DFU's with dry gangrene for over 30 days  Has been offloaded and treated with IV antibiotics  Scheduled for revascularization tomorrow with vascular surgery followed by partial 1st ray amputation with podiatry 9/14  Patient is candidate for HBOT, and would benefit from treatment once revascularized  Has history of seizure, stable on keppra  Has history of CHF with EF 35%, will monitor closely for any signs of CHF exacerbation which can be caused by HBO  Discussed all risks benefits with patient, answered all questions in regard to HBO    History of Present Illness   HPI:  Nadeem Zuniga is a [de-identified] y o  female who presents with a wound located at on b/l feet  Has been admitted several times for same wounds which have progressed  Currently having pain  Is already scheduled for surgery with vascular and podiatry    Inpatient consult to Hyperbariac  Consult performed by: Tony Woody ordered by: Bee Weldon          Review of Systems   Constitutional: Negative  HENT: Negative  Eyes: Negative  Respiratory: Negative  Cardiovascular: Negative  Gastrointestinal: Negative  Endocrine: Negative  Musculoskeletal: Negative  Skin: Positive for wound  Allergic/Immunologic: Negative  Neurological: Positive for seizures  Hematological: Negative  Psychiatric/Behavioral: Negative          Historical Information   Past Medical History:   Diagnosis Date    Altered mental status     Anemia     Anticoagulated     Xarelto for Afib/ flutter    Asthma     Atrial flutter (HCC)     maintained on anticoag w/ Xarelto    Bradycardia     CAD (coronary artery disease)     Candidiasis     Carotid stenosis, bilateral     Cataract  Chest pain     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) (HCC)     Chronic fatigue syndrome     Chronic low back pain     Chronic ulcer of toes (HCC)     left and right    Concussion w loss of consciousness of unsp duration, init     CVA (cerebral vascular accident) ([facility] ) 4/17/2018    Diabetes mellitus [facility] )     type 2, insulin dependent    DJD (degenerative joint disease)     Expressive aphasia     Foot pain     GERD (gastroesophageal reflux disease)     Herpes zoster     HLD (hyperlipidemia)     HTN (hypertension)     Hypothyroidism     Irritable bowel syndrome     Kidney stone     Lumbar radiculopathy     Lyme disease     Dx in hospital 8/2011    MI (myocardial infarction) ([facility] )     Migraines     Muscle spasm     Non-neoplastic nevus     Nontoxic multinodular goiter     NSVT (nonsustained ventricular tachycardia) (HCC)     Osteoarthritis     Other chronic pain     PAD (peripheral artery disease) (HCC)     Palpitations     Paroxysmal atrial fibrillation (HCC)     Pseudogout     Pulmonary hypertension (HCC)     S/P TAVR (transcatheter aortic valve replacement)     [facility]ures [facility]     Severe sepsis [facility]     Spinal stenosis     Stroke ([facility] ) 05/2018    Transient cerebral ischemia     Trigger ring finger     Viral gastroenteritis      Past Surgical History:   Procedure Laterality Date    APPENDECTOMY      ARTERIOGRAM  12/19/2017    CARDIAC CATHETERIZATION      CHOLECYSTECTOMY      COLONOSCOPY      CORONARY ARTERY BYPASS GRAFT  2004    IR ABDOMINAL ANGIOGRAPHY / INTERVENTION  8/10/2018    IR ABDOMINAL ANGIOGRAPHY / INTERVENTION  8/21/2018    LUMBAR LAMINECTOMY      CT ECHO TRANSESOPHAG R-T 2D W/PRB IMG ACQUISJ I&R N/A 4/3/2018    Procedure: TRANSESOPHAGEAL ECHOCARDIOGRAM (ROBB);   Surgeon: Jhoan Ramey DO;  Location: [facility];  Service: Cardiac Surgery    CT REPLACE AORTIC VALVE OPENFEMORAL ARTERY APPROACH N/A 4/3/2018 Procedure: REPLACEMENT AORTIC VALVE TRANSCATHETER (TAVR) TRANSFEMORAL w/ 26MM IVY YEVGENIY S3 VALVE;  Surgeon: Jacek Guo DO;  Location: [facility];  Service: Cardiac Surgery    THYROIDECTOMY      TOTAL ABDOMINAL HYSTERECTOMY W/ BILATERAL SALPINGOOPHORECTOMY       Social History   History   Alcohol Use No     History   Drug Use No     History   Smoking Status    Never Smoker   Smokeless Tobacco    Never Used     Family History:   Family History   Problem Relation Age of Onset    Cancer Mother     Stroke Mother     Cancer Father     Heart disease Father     Breast cancer Sister     Diabetes Daughter         Passed away January 2017        Meds/Allergies   all current active meds have been reviewed  Allergies   Allergen Reactions    Other Chest Pain     IVP-listed as "chest pain" in previous chart, patient stated this occurred "a long time ago" but does not remember exactly occurred     Contrast [Iodinated Diagnostic Agents] Other (See Comments)     Flash pulm edema    Doxycycline Chest Pain    Ketorolac Other (See Comments)     Chest pain     Levaquin [Levofloxacin] Chest Pain    Ondansetron Chest Pain     Prolonged QT    Toradol [Ketorolac Tromethamine] Chest Pain       Objective   Vitals: Blood pressure 138/66, pulse 65, temperature 98 1 °F (36 7 °C), temperature source Oral, resp  rate 20, height 5' 4" (1 626 m), weight 67 1 kg (147 lb 14 9 oz), SpO2 92 %, not currently breastfeeding  Wounds:     Wound 04/03/18 Other (Comment) Foot Other (Comment) pre existing ulcers  (Active)       Wound 08/08/18 Other (Comment) Foot Left red and black circular area on L toe (Active)       Wound 08/08/18 Other (Comment) Toe (Comment  which one) Other (Comment) black and red circular (Active)       Wound 08/08/18 Other (Comment) Foot Right red and black circular areas of outer right foot near big toe (Active)       Wound 08/08/18 Other (Comment) Toe (Comment  which one) Other (Comment); Right red and black (Active)       Wound 08/08/18 Other (Comment) Toe (Comment  which one) Right (Active)       Incision 12/19/17 Groin Right (Active)       Incision 04/03/18 Groin Left (Active)       Incision 04/03/18 Groin Right (Active)       Incision 08/21/18 Groin Right; Anterior (Active)       Other Ulcers 05/24/18 Toe (Comment which one) Right; Anterior BLACK LEFT GREAT TOE ULCER (Active)   Wound Description Black 9/11/2018  8:00 AM   Madelyn-wound Assessment Edema; Erythema;Fragile 9/11/2018  8:00 AM   Dressings Other (Comment) 9/10/2018  7:20 AM   Dressing Changed New dressing applied 9/11/2018  8:00 AM   Patient Tolerance Tolerated well 9/11/2018  8:00 AM   Dressing Status Clean;Dry; Intact 9/11/2018  8:00 AM       Other Ulcers 04/16/18 Foot Right scabbed area, reddened perimeter- no drainage (Active)       Other Ulcers 04/16/18 Foot Right calloused darkened scabbed area- no drainage-chronic (Active)       Other Ulcers 04/16/18 Foot Left L great toe arterial ulcer (Active)       Other Ulcers 05/24/18 Toe (Comment which one) Left BROWN ULCER ON LEFT SECOND TOE (Active)       Other Ulcers 05/24/18 Toe (Comment which one) Right BLACK ULCER ON RIGHT THIRD TOE (Active)       Other Ulcers 05/24/18 Toe (Comment which one) Right BLACK ULCER RIGHT FIFTH TOE (Active)       Other Ulcers Toe (Comment which one) stasis ulcers (Active)         Physical Exam   Constitutional: She is oriented to person, place, and time  She appears well-developed and well-nourished  HENT:   Head: Normocephalic  Right Ear: Hearing, tympanic membrane, external ear and ear canal normal    Left Ear: Hearing, tympanic membrane, external ear and ear canal normal    Eyes: Conjunctivae are normal  Pupils are equal, round, and reactive to light  Neck: Normal range of motion  Cardiovascular: Normal rate  Pulmonary/Chest: Effort normal and breath sounds normal    Abdominal: Soft  Musculoskeletal: She exhibits tenderness     Neurological: She is alert and oriented to person, place, and time  Skin:   Dry gangrene to L great toe, R 1st MTP , R 3rd toe       Lab Results: I have personally reviewed pertinent reports  Imaging: I have personally reviewed pertinent reports  EKG, Pathology, and Other Studies: I have personally reviewed pertinent reports  Code Status: Level 3 - DNAR and DNI  Advance Directive and Living Will:      Power of :    POLST:      Counseling / Coordination of Care  Total floor / unit time spent today 40 minutes    Greater than 50% of total time was spent with the patient and / or family counseling and / or coordination of care

## 2018-09-11 NOTE — PROGRESS NOTES
[facility] - Internal Medicine Progress Note      PATIENT INFORMATION      Patient: Dorothea Lynch [de-identified] y o  female   MRN: [id number]  PCP: Camilo Stanley MD  Unit/Bed#: -56 Encounter: [id number]  Date Of Visit: 09/11/18  Current Length of Stay: 4 day(s)     ASSESSMENTS & PLAN        1   Severe peripheral vascular disease  Involving both feet but mainly left great toe   Angio from 08/21/2018 shows severe multivessel disease in the left calf, patent left superficial femoral and popliteal arteries with diffuse non flow limiting disease  · Will discuss transfering patient to vascular surgery  Patient is here primarily for peripheral vascular disease  No active medical conditions primarily  Acute kidney injury has resolved  We do not believe patient is infected or has cellulitis as infectious Disease previously evaluated left great toe drainage and deemed wound cultures as colonization and stopped antibiotics  All other issues are stable at this time  Pt now having vascular surgery at [facility]  We will follow along  · Vascular:  Left bypass scheduled on 09/12 at [address] [id number] so we transferred on 09/11  · Podiatry:  Surgery on friday  · Consult for Hyperbaric Oxygen placed by Podiatry  · Cont Ancef/Flagyl  · X-ray shows no signs of osteomyelitis  · Started on diet as no plan for surgery currently  · Analgesia:  Pain mgmt on board     2   History of DVT/PE  Lower extremity duplex on 08/24/2018 showed nonocclusive thrombus in 1 of the paired peroneal veins in the proximal and distal calf on left side, no thrombus in right limb   On Xarelto chronically for anticoagulation  · Heparin gtt    · Hold SCDs in setting of chronic lower extremity wounds     3   Coronary artery disease status post CABG  · Continue atorvastatin/aspirin  · P r n  IV labetalol     4   Paroxysmal AFib  · Continue amiodarone 20 mg b i d  rate control  · Anticoagulation as above     5   Chronic systolic heart failure (without exacerbation)  · Lasix/lisinopril held in setting of LEONARD, resolved   Will discuss restarting Lasix during rounds         6   Elevated INR secondary to her Xarelto  · Anticoagulation as above     7   Hypertension  · Continue Amlodipine  · Lisinopril held due to acute kidney injury  · IV labetalol 5 mg q 6 hours p r n      8   Acute kidney injury  · Resolving  · Restart Lasix  · Cont holding Lisinopril     9   Type 2 diabetes mellitus  · Lantus and sliding scale insulin    VTE Pharmacologic Prophylaxis: Warfarin (Coumadin)   VTE Mechanical Prophylaxis: SCD's      SUBJECTIVE     Pt continue to complain about left toe pain  Patient denies chest pain, palpitations, SOB, cough, abdominal pain, nausea, vomiting, constipation, diarrhea, fevers/chills, headaches, dysuria  OBJECTIVE     Vitals:   Temp (24hrs), Av 6 °F (37 °C), Min:98 °F (36 7 °C), Max:99 °F (37 2 °C)    HR:  [66-68] 68  Resp:  [18] 18  BP: (113-147)/(47-69) 113/47  SpO2:  [93 %] 93 %  Body mass index is 25 39 kg/m²  Input and Output Summary (last 24 hours):        Intake/Output Summary (Last 24 hours) at 18 0709  Last data filed at 18 0615   Gross per 24 hour   Intake          1189 29 ml   Output              700 ml   Net           489 29 ml       Physical Exam:   GENERAL: NAD  HEENT:  NC/AT, PERRL, EOMI, MMM, no scleral icterus  CARDIAC:  RRR, +S1/S2, no S3/S4 heard, no m/g/r  PULMONARY:  CTA B/L, no wheezing/rales/rhonci, non-labored breathing  ABDOMEN:  Soft, NT/ND, +BS, no rebound/guarding/rigidity  Extremities:  0+ pulse in DP/PT        ADDITIONAL DATA     Labs & Recent Cultures:       Results from last 7 days  Lab Units 18  0540  18  0518   WBC Thousand/uL 13 16*  < > 10 53*   HEMOGLOBIN g/dL 7 9*  < > 7 7*   HEMATOCRIT % 26 9*  < > 26 4*   PLATELETS Thousands/uL 305  < > 342   NEUTROS PCT %  --   --  70   LYMPHS PCT %  --   --  16   MONOS PCT %  --   --  10   EOS PCT %  --   --  2   < > = values in this interval not displayed  Results from last 7 days  Lab Units 09/11/18  0540  09/08/18  0518   SODIUM mmol/L 136  < > 136   POTASSIUM mmol/L 3 8  < > 4 6   CHLORIDE mmol/L 102  < > 105   CO2 mmol/L 26  < > 24   BUN mg/dL 15  < > 28*   CREATININE mg/dL 0 95  < > 1 09   CALCIUM mg/dL 8 3  < > 8 7   ALK PHOS U/L  --   --  80   ALT U/L  --   --  17   AST U/L  --   --  26   < > = values in this interval not displayed      Results from last 7 days  Lab Units 09/09/18  1157   INR  1 52*           Results from last 7 days  Lab Units 09/10/18  1410   GRAM STAIN RESULT  No polys seen  3+ Gram negative rods  1+ Gram positive cocci in pairs   WOUND CULTURE  4+ Growth of Pseudomonas aeruginosa*  4+ Growth of Klebsiella oxytoca*  4+ Growth of Staphylococcus aureus*         Last 24 Hours Medication List:     Current Facility-Administered Medications:  acetaminophen 650 mg Oral Q6H PRN Lizbeth Gan DO    albuterol 1 puff Inhalation Q4H PRN Matt Kulkarni MD    amiodarone 200 mg Oral BID Matt Kulkarni MD    amLODIPine 10 mg Oral Daily Matt Kulkarni MD    atorvastatin 80 mg Oral Daily Matt Kulkarni MD    bisacodyl 5 mg Oral Daily PRN Davis Kinsey MD    cefazolin 1,000 mg Intravenous Q12H Matt Kulkarni MD Last Rate: 1,000 mg (09/10/18 1386)   cholecalciferol 1,000 Units Oral Daily Matt Kulkarni MD    fentaNYL 25 mcg Transdermal Q72H Davis Kinsey MD    fluticasone 1 puff Inhalation Q12H [facility] Matt Kulkarni MD    furosemide 40 mg Oral Daily Davis Kinsey MD    gabapentin 100 mg Oral TID Matt Kulkarni MD    heparin (porcine) 3-20 Units/kg/hr (Order-Specific) Intravenous Titrated Carlton Kinsey MD Last Rate: 16 Units/kg/hr (09/11/18 4259)   heparin (porcine) 2,000 Units Intravenous PRN Carlton Kinsey MD    heparin (porcine) 4,000 Units Intravenous PRN Carlton Kinsey MD    HYDROmorphone 0 5 mg Intravenous Q6H PRN Davis Kinsey MD    insulin glargine 18 Units Subcutaneous BID Matt Kulkarni MD    insulin lispro 1-5 Units Subcutaneous TID AC Tiffanie Mukherjee, DO    insulin lispro 1-5 Units Subcutaneous HS Tiffanie Mukherjee DO    labetalol 5 mg Intravenous Q6H PRN Tiffanie Mukherjee DO    levETIRAcetam 750 mg Oral Daily Carissa Avilez MD    levothyroxine 100 mcg Oral Early Morning Carissa Avilez MD    magnesium oxide 400 mg Oral BID Carissa Avilez MD    metroNIDAZOLE 500 mg Oral Counts include [facility] beds at the [facility] Trixie Cm DPM    oxyCODONE 5 mg Oral Q4H PRN Davis Kinsey MD    polyethylene glycol 17 g Oral Daily Davis Kinsey MD    senna-docusate sodium 1 tablet Oral HS Davis Kinsey MD           Time Spent for Care: 30 mins spent in total   More than 50% of total time spent on counseling and coordination of care as described above  Current Length of Stay: 4 day(s)      Code Status: Level 3 - DNAR and DNI          ** Please Note: This note is constructed using a voice recognition dictation system   **

## 2018-09-11 NOTE — PROGRESS NOTES
I gave pt oxycodone 5mg tablet and she dropped on floor my witness is Magdaline Mercedeztis PCA  Tablet put in sharps box  I gave pt another oxydone 5mg tablet to take

## 2018-09-11 NOTE — PROGRESS NOTES
Progress Note - Vascular Surgery   Les Lin [de-identified] y o  female MRN: [id number]  Unit/Bed#: MS Medley2-01 Encounter: [id number]    Assessment:  [de-identified] y/o F w/ PAD who p/w L 1st toe wet gangrene    Plan:  --OR tomorrow at [facility] for pop-plantar bypass w/ GSV   --OR on 9/14 w/ Podiatry for L partial 1st ray amputation   --NPO@ [place]  --Continue Heparin gtt  --Continue Abx  --Medical management per primary team    Subjective/Objective     Subjective:     No acute events overnight  Pt c/o "10/10, constant" L foot pain this AM  Denies any b/l lower extremity numbness, tingling, paresthesias  Denies any other complaints  Objective:     Blood pressure (!) 113/47, pulse 68, temperature 98 7 °F (37 1 °C), temperature source Oral, resp  rate 18, height 5' 4" (1 626 m), weight 73 kg (160 lb 15 oz), SpO2 93 %, not currently breastfeeding  ,Body mass index is 27 62 kg/m²  Intake/Output Summary (Last 24 hours) at 09/11/18 0457  Last data filed at 09/11/18 0359   Gross per 24 hour   Intake           1063 9 ml   Output              700 ml   Net            363 9 ml       Invasive Devices     Peripheral Intravenous Line            Peripheral IV 09/07/18 Right Antecubital 3 days    Peripheral IV 09/07/18 Right Forearm 3 days                Physical Exam:     GEN: NAD  HEENT: MMM  CV: RRR  Lung: normal effort  Ab: Soft, NT/ND  Extrem: No CCE; L 1st toe wet gangrene, L 2nd toe eschar, R 3rd toe dry gangrene; L foot dressing c/d/i  Neuro:  A+Ox3, motor and sensation grossly intact  Pulse exam: L doppl PT, R doppl DP/PT    Lab, Imaging and other studies:CBC: No results found for: WBC, HGB, HCT, MCV, PLT, ADJUSTEDWBC, MCH, MCHC, RDW, MPV, NRBC, CMP: No results found for: NA, K, CL, CO2, ANIONGAP, BUN, CREATININE, GLUCOSE, CALCIUM, AST, ALT, ALKPHOS, PROT, ALBUMIN, BILITOT, EGFR, Coagulation: No results found for: PT, INR, APTT, Urinalysis: No results found for: Fiona Srinath, SPECGRAV, PHUR, LEUKOCYTESUR, NITRITE, PROTEINUA, GLUCOSEU, KETONESU, BILIRUBINUR, BLOODU, Amylase: No results found for: AMYLASE, Lipase: No results found for: LIPASE

## 2018-09-12 ENCOUNTER — APPOINTMENT (INPATIENT)
Dept: RADIOLOGY | Facility: HOSPITAL | Age: 78
DRG: 239 | End: 2018-09-12
Attending: ANESTHESIOLOGY
Payer: MEDICARE

## 2018-09-12 ENCOUNTER — ANESTHESIA EVENT (INPATIENT)
Dept: PERIOP | Facility: HOSPITAL | Age: 78
DRG: 239 | End: 2018-09-12
Payer: MEDICARE

## 2018-09-12 ENCOUNTER — APPOINTMENT (INPATIENT)
Dept: RADIOLOGY | Facility: HOSPITAL | Age: 78
DRG: 239 | End: 2018-09-12
Payer: MEDICARE

## 2018-09-12 ENCOUNTER — ANESTHESIA (INPATIENT)
Dept: PERIOP | Facility: HOSPITAL | Age: 78
DRG: 239 | End: 2018-09-12
Payer: MEDICARE

## 2018-09-12 LAB
ANION GAP SERPL CALCULATED.3IONS-SCNC: 8 MMOL/L (ref 4–13)
APTT PPP: 40 SECONDS (ref 24–36)
APTT PPP: 60 SECONDS (ref 24–36)
BASE EXCESS BLDA CALC-SCNC: 2 MMOL/L (ref -2–3)
BASOPHILS # BLD AUTO: 0.02 THOUSANDS/ΜL (ref 0–0.1)
BASOPHILS NFR BLD AUTO: 0 % (ref 0–1)
BUN SERPL-MCNC: 13 MG/DL (ref 5–25)
CA-I BLD-SCNC: 1.11 MMOL/L (ref 1.12–1.32)
CALCIUM SERPL-MCNC: 8.4 MG/DL (ref 8.3–10.1)
CHLORIDE SERPL-SCNC: 102 MMOL/L (ref 100–108)
CO2 SERPL-SCNC: 26 MMOL/L (ref 21–32)
CREAT SERPL-MCNC: 0.93 MG/DL (ref 0.6–1.3)
EOSINOPHIL # BLD AUTO: 0.32 THOUSAND/ΜL (ref 0–0.61)
EOSINOPHIL NFR BLD AUTO: 3 % (ref 0–6)
ERYTHROCYTE [DISTWIDTH] IN BLOOD BY AUTOMATED COUNT: 19.6 % (ref 11.6–15.1)
GFR SERPL CREATININE-BSD FRML MDRD: 58 ML/MIN/1.73SQ M
GLUCOSE SERPL-MCNC: 101 MG/DL (ref 65–140)
GLUCOSE SERPL-MCNC: 104 MG/DL (ref 65–140)
GLUCOSE SERPL-MCNC: 109 MG/DL (ref 65–140)
GLUCOSE SERPL-MCNC: 132 MG/DL (ref 65–140)
GLUCOSE SERPL-MCNC: 75 MG/DL (ref 65–140)
GLUCOSE SERPL-MCNC: 87 MG/DL (ref 65–140)
HCO3 BLDA-SCNC: 26.2 MMOL/L (ref 22–28)
HCT VFR BLD AUTO: 25.4 % (ref 34.8–46.1)
HCT VFR BLD CALC: 25 % (ref 34.8–46.1)
HGB BLD-MCNC: 7.3 G/DL (ref 11.5–15.4)
HGB BLDA-MCNC: 8.5 G/DL (ref 11.5–15.4)
IMM GRANULOCYTES # BLD AUTO: 0.07 THOUSAND/UL (ref 0–0.2)
IMM GRANULOCYTES NFR BLD AUTO: 1 % (ref 0–2)
KCT BLD-ACNC: 155 SEC (ref 89–137)
LYMPHOCYTES # BLD AUTO: 1.66 THOUSANDS/ΜL (ref 0.6–4.47)
LYMPHOCYTES NFR BLD AUTO: 14 % (ref 14–44)
MCH RBC QN AUTO: 23.5 PG (ref 26.8–34.3)
MCHC RBC AUTO-ENTMCNC: 28.7 G/DL (ref 31.4–37.4)
MCV RBC AUTO: 82 FL (ref 82–98)
MONOCYTES # BLD AUTO: 1.29 THOUSAND/ΜL (ref 0.17–1.22)
MONOCYTES NFR BLD AUTO: 11 % (ref 4–12)
NEUTROPHILS # BLD AUTO: 8.38 THOUSANDS/ΜL (ref 1.85–7.62)
NEUTS SEG NFR BLD AUTO: 71 % (ref 43–75)
NRBC BLD AUTO-RTO: 0 /100 WBCS
PCO2 BLD: 27 MMOL/L (ref 21–32)
PCO2 BLD: 37.7 MM HG (ref 36–44)
PH BLD: 7.45 [PH] (ref 7.35–7.45)
PLATELET # BLD AUTO: 290 THOUSANDS/UL (ref 149–390)
PMV BLD AUTO: 10.9 FL (ref 8.9–12.7)
PO2 BLD: 203 MM HG (ref 75–129)
POTASSIUM BLD-SCNC: 3.6 MMOL/L (ref 3.5–5.3)
POTASSIUM SERPL-SCNC: 4.1 MMOL/L (ref 3.5–5.3)
RBC # BLD AUTO: 3.1 MILLION/UL (ref 3.81–5.12)
SAO2 % BLD FROM PO2: 100 % (ref 95–98)
SODIUM BLD-SCNC: 136 MMOL/L (ref 136–145)
SODIUM SERPL-SCNC: 136 MMOL/L (ref 136–145)
SPECIMEN SOURCE: ABNORMAL
SPECIMEN SOURCE: ABNORMAL
WBC # BLD AUTO: 11.74 THOUSAND/UL (ref 4.31–10.16)

## 2018-09-12 PROCEDURE — 82947 ASSAY GLUCOSE BLOOD QUANT: CPT

## 2018-09-12 PROCEDURE — 82803 BLOOD GASES ANY COMBINATION: CPT

## 2018-09-12 PROCEDURE — 71045 X-RAY EXAM CHEST 1 VIEW: CPT

## 2018-09-12 PROCEDURE — 82948 REAGENT STRIP/BLOOD GLUCOSE: CPT

## 2018-09-12 PROCEDURE — 84132 ASSAY OF SERUM POTASSIUM: CPT

## 2018-09-12 PROCEDURE — 99024 POSTOP FOLLOW-UP VISIT: CPT | Performed by: SURGERY

## 2018-09-12 PROCEDURE — 85025 COMPLETE CBC W/AUTO DIFF WBC: CPT | Performed by: INTERNAL MEDICINE

## 2018-09-12 PROCEDURE — 82330 ASSAY OF CALCIUM: CPT

## 2018-09-12 PROCEDURE — 99232 SBSQ HOSP IP/OBS MODERATE 35: CPT | Performed by: INTERNAL MEDICINE

## 2018-09-12 PROCEDURE — 35761: CPT | Performed by: SURGERY

## 2018-09-12 PROCEDURE — 84295 ASSAY OF SERUM SODIUM: CPT

## 2018-09-12 PROCEDURE — 85014 HEMATOCRIT: CPT

## 2018-09-12 PROCEDURE — 04JY0ZZ INSPECTION OF LOWER ARTERY, OPEN APPROACH: ICD-10-PCS | Performed by: SURGERY

## 2018-09-12 PROCEDURE — 85730 THROMBOPLASTIN TIME PARTIAL: CPT | Performed by: SURGERY

## 2018-09-12 PROCEDURE — 85347 COAGULATION TIME ACTIVATED: CPT

## 2018-09-12 PROCEDURE — 85730 THROMBOPLASTIN TIME PARTIAL: CPT | Performed by: INTERNAL MEDICINE

## 2018-09-12 PROCEDURE — 80048 BASIC METABOLIC PNL TOTAL CA: CPT | Performed by: INTERNAL MEDICINE

## 2018-09-12 RX ORDER — ACETAMINOPHEN 325 MG/1
975 TABLET ORAL ONCE
Status: COMPLETED | OUTPATIENT
Start: 2018-09-12 | End: 2018-09-12

## 2018-09-12 RX ORDER — ROCURONIUM BROMIDE 10 MG/ML
INJECTION, SOLUTION INTRAVENOUS AS NEEDED
Status: DISCONTINUED | OUTPATIENT
Start: 2018-09-12 | End: 2018-09-12 | Stop reason: SURG

## 2018-09-12 RX ORDER — FENTANYL CITRATE/PF 50 MCG/ML
25 SYRINGE (ML) INJECTION
Status: COMPLETED | OUTPATIENT
Start: 2018-09-12 | End: 2018-09-12

## 2018-09-12 RX ORDER — ETOMIDATE 2 MG/ML
INJECTION INTRAVENOUS AS NEEDED
Status: DISCONTINUED | OUTPATIENT
Start: 2018-09-12 | End: 2018-09-12 | Stop reason: SURG

## 2018-09-12 RX ORDER — SODIUM CHLORIDE 9 MG/ML
INJECTION, SOLUTION INTRAVENOUS CONTINUOUS PRN
Status: DISCONTINUED | OUTPATIENT
Start: 2018-09-12 | End: 2018-09-12 | Stop reason: SURG

## 2018-09-12 RX ORDER — LIDOCAINE HYDROCHLORIDE 10 MG/ML
INJECTION, SOLUTION INFILTRATION; PERINEURAL AS NEEDED
Status: DISCONTINUED | OUTPATIENT
Start: 2018-09-12 | End: 2018-09-12 | Stop reason: SURG

## 2018-09-12 RX ORDER — SODIUM CHLORIDE 9 MG/ML
20 INJECTION, SOLUTION INTRAVENOUS CONTINUOUS
Status: DISCONTINUED | OUTPATIENT
Start: 2018-09-12 | End: 2018-09-14

## 2018-09-12 RX ORDER — FENTANYL CITRATE 50 UG/ML
INJECTION, SOLUTION INTRAMUSCULAR; INTRAVENOUS AS NEEDED
Status: DISCONTINUED | OUTPATIENT
Start: 2018-09-12 | End: 2018-09-12 | Stop reason: SURG

## 2018-09-12 RX ADMIN — Medication 500 MG: at 10:14

## 2018-09-12 RX ADMIN — METRONIDAZOLE 500 MG: 500 TABLET ORAL at 00:57

## 2018-09-12 RX ADMIN — LEVOTHYROXINE SODIUM 100 MCG: 100 TABLET ORAL at 05:46

## 2018-09-12 RX ADMIN — CEFAZOLIN SODIUM 2000 MG: 2 SOLUTION INTRAVENOUS at 05:44

## 2018-09-12 RX ADMIN — ROCURONIUM BROMIDE 10 MG: 10 INJECTION INTRAVENOUS at 10:52

## 2018-09-12 RX ADMIN — ATORVASTATIN CALCIUM 80 MG: 80 TABLET, FILM COATED ORAL at 16:11

## 2018-09-12 RX ADMIN — ETOMIDATE 10 MG: 2 INJECTION INTRAVENOUS at 09:13

## 2018-09-12 RX ADMIN — SODIUM CHLORIDE: 9 INJECTION, SOLUTION INTRAVENOUS at 09:23

## 2018-09-12 RX ADMIN — GABAPENTIN 200 MG: 100 CAPSULE ORAL at 17:39

## 2018-09-12 RX ADMIN — FENTANYL CITRATE 25 MCG: 50 INJECTION, SOLUTION INTRAMUSCULAR; INTRAVENOUS at 13:03

## 2018-09-12 RX ADMIN — DICLOFENAC 2 G: 10 GEL TOPICAL at 21:18

## 2018-09-12 RX ADMIN — LIDOCAINE HYDROCHLORIDE 50 MG: 10 INJECTION, SOLUTION INFILTRATION; PERINEURAL at 09:13

## 2018-09-12 RX ADMIN — FENTANYL CITRATE 25 MCG: 50 INJECTION, SOLUTION INTRAMUSCULAR; INTRAVENOUS at 12:45

## 2018-09-12 RX ADMIN — CEFAZOLIN SODIUM 1000 MG: 1 SOLUTION INTRAVENOUS at 01:35

## 2018-09-12 RX ADMIN — HEPARIN SODIUM AND DEXTROSE 14 UNITS/KG/HR: 10000; 5 INJECTION INTRAVENOUS at 17:51

## 2018-09-12 RX ADMIN — FENTANYL CITRATE 25 MCG: 50 INJECTION, SOLUTION INTRAMUSCULAR; INTRAVENOUS at 13:11

## 2018-09-12 RX ADMIN — FENTANYL CITRATE 25 MCG: 50 INJECTION, SOLUTION INTRAMUSCULAR; INTRAVENOUS at 12:54

## 2018-09-12 RX ADMIN — NOREPINEPHRINE BITARTRATE 2 MCG/MIN: 1 INJECTION, SOLUTION, CONCENTRATE INTRAVENOUS at 10:44

## 2018-09-12 RX ADMIN — SODIUM CHLORIDE 75 ML/HR: 0.9 INJECTION, SOLUTION INTRAVENOUS at 01:34

## 2018-09-12 RX ADMIN — SODIUM CHLORIDE 20 ML/HR: 0.9 INJECTION, SOLUTION INTRAVENOUS at 17:51

## 2018-09-12 RX ADMIN — FENTANYL CITRATE 25 MCG: 50 INJECTION, SOLUTION INTRAMUSCULAR; INTRAVENOUS at 11:30

## 2018-09-12 RX ADMIN — AMIODARONE HYDROCHLORIDE 200 MG: 200 TABLET ORAL at 17:39

## 2018-09-12 RX ADMIN — ACETAMINOPHEN 975 MG: 325 TABLET, FILM COATED ORAL at 16:10

## 2018-09-12 RX ADMIN — ACETAMINOPHEN 975 MG: 325 TABLET ORAL at 05:47

## 2018-09-12 RX ADMIN — CEFAZOLIN SODIUM 1000 MG: 1 SOLUTION INTRAVENOUS at 09:43

## 2018-09-12 RX ADMIN — METRONIDAZOLE 500 MG: 500 TABLET ORAL at 16:06

## 2018-09-12 RX ADMIN — ROCURONIUM BROMIDE 50 MG: 10 INJECTION INTRAVENOUS at 09:13

## 2018-09-12 RX ADMIN — INSULIN GLARGINE 18 UNITS: 100 INJECTION, SOLUTION SUBCUTANEOUS at 17:43

## 2018-09-12 RX ADMIN — CHLORHEXIDINE GLUCONATE 15 ML: 1.2 RINSE ORAL at 07:49

## 2018-09-12 RX ADMIN — MAGNESIUM OXIDE TAB 400 MG (241.3 MG ELEMENTAL MG) 400 MG: 400 (241.3 MG) TAB at 17:39

## 2018-09-12 RX ADMIN — ACETAMINOPHEN 975 MG: 325 TABLET ORAL at 21:17

## 2018-09-12 RX ADMIN — OXYCODONE HYDROCHLORIDE 5 MG: 5 TABLET ORAL at 20:31

## 2018-09-12 RX ADMIN — SODIUM CHLORIDE: 0.9 INJECTION, SOLUTION INTRAVENOUS at 08:38

## 2018-09-12 RX ADMIN — SUGAMMADEX 300 MG: 100 INJECTION, SOLUTION INTRAVENOUS at 11:17

## 2018-09-12 RX ADMIN — FENTANYL CITRATE 50 MCG: 50 INJECTION, SOLUTION INTRAMUSCULAR; INTRAVENOUS at 09:06

## 2018-09-12 RX ADMIN — OXYCODONE HYDROCHLORIDE 5 MG: 5 TABLET ORAL at 02:08

## 2018-09-12 RX ADMIN — SODIUM CHLORIDE 20 ML/HR: 0.9 INJECTION, SOLUTION INTRAVENOUS at 14:37

## 2018-09-12 RX ADMIN — FENTANYL CITRATE 50 MCG: 50 INJECTION, SOLUTION INTRAMUSCULAR; INTRAVENOUS at 09:23

## 2018-09-12 NOTE — ANESTHESIA POSTPROCEDURE EVALUATION
Post-Op Assessment Note      CV Status:  Stable    Mental Status:  Alert and awake    Hydration Status:  Euvolemic    PONV Controlled:  Controlled    Airway Patency:  Patent    Post Op Vitals Reviewed: Yes          Staff: CRNA, Anesthesiologist           BP   155/60   Temp   97   Pulse  55   Resp   14   SpO2   97

## 2018-09-12 NOTE — ANESTHESIA PREPROCEDURE EVALUATION
Review of Systems/Medical History  Patient summary reviewed  Chart reviewed  No history of anesthetic complications     Cardiovascular  EKG reviewed, Exercise tolerance (METS): poor,  Hyperlipidemia, Hypertension , Valvular heart disease (Moderate) , mitral valve stenosis, Valve replacement (TAVR 4/3/18) aortic valve  replacement, Past MI , CAD , History of CABG, Dysrhythmias , atrial fibrillation and atrial flutter, CHF (EF 35%) ,   Comment: Bilateral internal carotid stenosis, Pulmonary hypertension Pulmonary  Not a smoker , Asthma , No recent URI ,        GI/Hepatic    GERD well controlled,   Comment: Confirmed NPO appropriate  IBS     Kidney stones,        Endo/Other  Diabetes (Hgb A1c 7 7) poorly controlled type 2 Insulin, History of thyroid disease (multinodular goiter) , hypothyroidism,      GYN  Negative gynecology ROS          Hematology    Coagulation disorder (Heparin infusion) ,    Musculoskeletal  Back pain , Osteoarthritis,   Arthritis     Neurology  Seizures ,  TIA, CVA (expressive aphasia with MRI 4/17 showing multiple embolic appearing infarcts s/p TAVR 4/3, reports right-sided weakness) , Headaches,    Psychology   Negative psychology ROS              Physical Exam    Airway    Mallampati score: II  TM Distance: <3 FB  Neck ROM: full     Dental   upper dentures and lower dentures,     Cardiovascular  Rhythm: regular, No murmur,     Pulmonary  Breath sounds clear to auscultation,     Other Findings    5/1/18 TTE:  LEFT VENTRICLE:  The ventricle was mildly dilated  Systolic function was moderately to markedly reduced  Ejection fraction was estimated to be 35 %  There was moderate diffuse hypokinesis with distinct regional wall motion abnormalities  More severe hypokinesis to akinesis was seen in the inferoseptal and inferior walls  Wall thickness was mildly increased      VENTRICULAR SEPTUM:  There was moderate dyssynergic motion   These changes are consistent with LBBB      RIGHT VENTRICLE:  The ventricle was moderately dilated  Systolic function was mildly reduced      LEFT ATRIUM:  The atrium was moderately dilated      RIGHT ATRIUM:  The atrium was moderately dilated      MITRAL VALVE:  There was marked annular calcification  There was moderate diffuse thickening  There was moderately reduced leaflet separation  Transmitral velocity was increased due to valvular stenosis  There was moderate stenosis  There was mild regurgitation      AORTIC VALVE:  A bioprosthesis was present  It exhibited normal function  There was no significant perivalvular regurgitation  Valve mean gradient was 8 mmHg      TRICUSPID VALVE:  There was moderate regurgitation  Pulmonary artery systolic pressure was markedly increased  Estimated peak PA pressure was 70 mmHg      IVC, HEPATIC VEINS:  The inferior vena cava was dilated  3/19/18 LHC:  CORONARY CIRCULATION:  Mid LAD: There was a 90 % stenosis  1st diagonal: There was a 100 % stenosis at the ostium of the vessel segment  2nd diagonal: There was a 100 % stenosis at the ostium of the vessel segment  3rd diagonal: There was a 100 % stenosis at the ostium of the vessel segment  1st obtuse marginal: There was a 100 % stenosis at the ostium of the vessel segment  Proximal RCA: There was a 60 % stenosis  The lesion was hazy and ulcerated  It appears amenable to percutaneous intervention  Right PDA: There was a 50 % stenosis  Graft to the 1st diagonal: There was a discrete 50 % stenosis at the proximal anastomosis  It appears amenable to percutaneous intervention  Graft to the 1st obtuse marginal: There was a 100 % stenosis at the proximal anastomosis  Graft to the RPDA: There was a 100 % stenosis at the proximal anastomosis  Anesthesia Plan  ASA Score- 4     Anesthesia Type- general with ASA Monitors  Additional Monitors: arterial line  Airway Plan: ETT  Comment: Pre-induction arterial line   Also reviewed risks/benefits of central venous catheterization  Plan Factors-  Patient did not smoke on day of surgery  Induction- intravenous  Postoperative Plan- Plan for postoperative opioid use  Planned trial extubation    Informed Consent- Anesthetic plan and risks discussed with patient

## 2018-09-12 NOTE — OP NOTE
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 ([address] ) [I70 269]    Procedure(s) (LRB):  Exploration of posterior tibial, medial, and lateral plantar arteries  (Left)    Specimen(s):  * No specimens in log *    Estimated Blood Loss:   Minimal    Drains:  Urethral Catheter Latex 16 Fr  (Active)   Number of days: 0       [REMOVED] NG/OG/Enteral Tube Orogastric 18 Fr Center mouth (Removed)   Number of days: 0       Anesthesia Type:   General    Operative Indications:  PAD (peripheral artery disease) ([address] ) [I73 9]  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 ([address] ) [I70 269]      Operative Findings:  See op report    Complications:   None      Procedure and Technique:  The patient Amrit Love was identified in the operating room after adequate general endotracheal anesthesia was obtained the bilateral legs were prepped and draped using chlorprep prep and sterile drapes  Incision was made over the posterior tibial artery on the left leg sharp dissection down through skin subcutaneous tissue the distal posterior tibial artery was identified noted to be  Circumferentially calcified with 1 very small area a centimeter  proximal to the bifurcation that was soft      Slow dissection down following both the medial tarsal branch which became diminutive after several centimeters and the lateral tarsal branch which remain with a reasonable caliber however was circumferentially calcified following this out 3 or 4 centimeters as the branch headed towards the central foot and became diminutive from a size standpoint as well  Multiple crossing vein branches were clipped with hemoclips and 3- 0 silk suture  The distal posterior tibial artery was opened at the 1 small area however there was no lumen discovered  Using a Segura scissors it was taken down to the distal aspect of the posterior tibial artery and then a 1 millimeter coronary dilator was placed could not be manipulated in either direction  This artery was then closed using running 7- 0 Prolene with a back and forth technique  Hemostasis was obtained, the wound was irrigated with copious amounts of antibiotic containing solution as well as Betadine  The wound was closed in 2 layers using running 3 0 Monocryl subcutaneous tissue and a 4-0 Nylon in an interrupted vertical mattress fashion   Sterile dressing was placed she was taken to the recovery room in stable condition       I was present for the entire procedure and A qualified resident physician was not available    Patient Disposition:  APU and hemodynamically stable    SIGNATURE: Kate Mason MD  DATE: September 12, 2018  TIME: 11:42 AM

## 2018-09-12 NOTE — PROGRESS NOTES
[facility] - Internal Medicine Progress Note      PATIENT INFORMATION      Patient: Taylor Sanford [de-identified] y o  female   MRN: [id number]  PCP: Vivien Carey MD  Unit/Bed#: -40 Encounter: [id number]  Date Of Visit: 18  Current Length of Stay: 5 day(s)     ASSESSMENTS & PLAN        1   Severe peripheral vascular disease  Involving both feet but mainly left great toe   Dry gangrene with concern for wet gangrene of left great toe      · Plan per Podiatry/Vascular  · Analgesia:  Plan per Acute Pain Service     2   History of DVT/PE  · Heparin gtt  · Hold SCDs in setting of chronic lower extremity wounds     3   Coronary artery disease status post CABG  · Continue atorvastatin/aspirin     4   Paroxysmal AFib  · Continue amiodarone 20 mg b i d  rate control  · Anticoagulation as above     5   Chronic systolic heart failure (without exacerbation)  · Cont Lasix         6   Elevated INR secondary to her Xarelto  · Anticoagulation as above     7   Hypertension  · Continue Amlodipine  · Lisinopril held  · IV labetalol 5 mg q 6 hours p r n      8   Acute kidney injury  · Resolved     9   Type 2 diabetes mellitus  · Lantus and sliding scale insulin    VTE Pharmacologic Prophylaxis: Heparin   VTE Mechanical Prophylaxis: SCD's      SUBJECTIVE     No new complaints  Patient denies chest pain, palpitations, SOB, cough, abdominal pain, nausea, vomiting, constipation, diarrhea, fevers/chills, headaches, dysuria  OBJECTIVE     Vitals:   Temp (24hrs), Av 3 °F (36 8 °C), Min:98 1 °F (36 7 °C), Max:98 6 °F (37 °C)    HR:  [61-68] 61  Resp:  [18-20] 20  BP: (130-148)/(63-85) 130/63  SpO2:  [92 %-93 %] 93 %  Body mass index is 27 13 kg/m²  Input and Output Summary (last 24 hours):        Intake/Output Summary (Last 24 hours) at 18 0654  Last data filed at 18 0507   Gross per 24 hour   Intake           824 67 ml   Output              450 ml   Net           374 67 ml       Physical Exam:   GENERAL: NAD  HEENT:  NC/AT, PERRL, EOMI, MMM, no scleral icterus  CARDIAC:  RRR, +S1/S2, no S3/S4 heard, no m/g/r  PULMONARY:  CTA B/L, no wheezing/rales/rhonci, non-labored breathing  ABDOMEN:  Soft, NT/ND, +BS, no rebound/guarding/rigidity  Extremities:  2+ Pulses in DP/PT  No edema, cyanosis, or clubbing          ADDITIONAL DATA     Labs & Recent Cultures:       Results from last 7 days  Lab Units 09/11/18  0540  09/08/18  0518   WBC Thousand/uL 13 16*  < > 10 53*   HEMOGLOBIN g/dL 7 9*  < > 7 7*   HEMATOCRIT % 26 9*  < > 26 4*   PLATELETS Thousands/uL 305  < > 342   NEUTROS PCT %  --   --  70   LYMPHS PCT %  --   --  16   MONOS PCT %  --   --  10   EOS PCT %  --   --  2   < > = values in this interval not displayed  Results from last 7 days  Lab Units 09/11/18  0540  09/08/18  0518   SODIUM mmol/L 136  < > 136   POTASSIUM mmol/L 3 8  < > 4 6   CHLORIDE mmol/L 102  < > 105   CO2 mmol/L 26  < > 24   BUN mg/dL 15  < > 28*   CREATININE mg/dL 0 95  < > 1 09   CALCIUM mg/dL 8 3  < > 8 7   ALK PHOS U/L  --   --  80   ALT U/L  --   --  17   AST U/L  --   --  26   < > = values in this interval not displayed      Results from last 7 days  Lab Units 09/09/18  1157   INR  1 52*           Results from last 7 days  Lab Units 09/11/18  0845   GRAM STAIN RESULT  No polys seen  3+ Gram negative rods  1+ Gram positive cocci in pairs   WOUND CULTURE  4+ Growth of Pseudomonas aeruginosa*  4+ Growth of Klebsiella oxytoca*  4+ Growth of Staphylococcus aureus*         Last 24 Hours Medication List:     Current Facility-Administered Medications:  acetaminophen 975 mg Oral Q8H [address] Nasreen Montenegro PA-C    albuterol 1 puff Inhalation Q4H PRN Darling Mcbride MD    amiodarone 200 mg Oral BID Darling Mcbride MD    amLODIPine 10 mg Oral Daily Darling Mcbride MD    atorvastatin 80 mg Oral Daily Darling Mcbride MD    bisacodyl 5 mg Oral Daily PRN Davis Kinsey MD    bisacodyl 10 mg Rectal Daily PRN Sandee Montenegro PA-C    cefazolin 1,000 mg Intravenous Q12H Lakesha Escalera MD Last Rate: Stopped (09/12/18 0255)   chlorhexidine 15 mL Swish & Spit Q12H MAGDALENO Chappell    cholecalciferol 1,000 Units Oral Daily Lakesha Escalera MD    diclofenac sodium 2 g Topical 4x Daily Davis Kinsey MD    fentaNYL 25 mcg Transdermal Q72H Davis Kinsey MD    fluticasone 1 puff Inhalation Q12H [facility] Lakesha Escalera MD    furosemide 40 mg Oral Daily Davis Kinsey MD    gabapentin 200 mg Oral BID Nasreen Montenegro PA-C    heparin (porcine) 3-20 Units/kg/hr (Order-Specific) Intravenous Titrated Carlton Kinsey MD Last Rate: 14 Units/kg/hr (09/11/18 2000)   heparin (porcine) 2,000 Units Intravenous PRN Carlton Kinsey MD    heparin (porcine) 4,000 Units Intravenous PRN Carlton Kinsey MD    HYDROmorphone 0 5 mg Intravenous Q6H PRN Davis Kinsey MD    insulin glargine 18 Units Subcutaneous BID Lakesha Escalera MD    insulin lispro 1-5 Units Subcutaneous TID AC Tessy Earnest, DO    insulin lispro 1-5 Units Subcutaneous HS Tessy Earnest, DO    labetalol 5 mg Intravenous Q6H PRN Tessy Jaime, DO    levETIRAcetam 750 mg Oral Daily Lakesha Escalera MD    levothyroxine 100 mcg Oral Early Morning Lakesha Escalera MD    magnesium oxide 400 mg Oral BID Lakesha Escalera MD    metroNIDAZOLE 500 mg Oral [facility] Edsonkristen Calvert, DPM    oxyCODONE 5 mg Oral Q4H PRN Davis Kinsey MD    polyethylene glycol 17 g Oral Daily Davis Kinsey MD    senna-docusate sodium 1 tablet Oral HS Davis Kinsey MD    sodium chloride 75 mL/hr Intravenous Continuous Nasreen Montenegro PA-C Last Rate: 75 mL/hr (09/12/18 0134)   sodium phosphate-biphosphate 1 enema Rectal Once PRN Sheron Alvarado PA-C           Time Spent for Care: 30 mins spent in total   More than 50% of total time spent on counseling and coordination of care as described above        Current Length of Stay: 5 day(s)      Code Status: Level 3 - DNAR and DNI Dara Soulier Dara Soulier Dara Soulier    ** Please Note: This note is constructed using a voice recognition dictation system   **

## 2018-09-12 NOTE — PROGRESS NOTES
Emelny Corral, Director of wound management came to see patient  Patient has a request for Hyperbaric chamber for tomorrow  Called [facility] and spoke to resident Magdalena Richards, plan is to downgrade patient tomorrow to med surg non tele and d'c IVF so that patient can go to the chamber at 1330  Guidelines discussed and sheet was provided to nursing  Will pass on in report for night shift

## 2018-09-12 NOTE — PROGRESS NOTES
Pt resting in bed; a/o x4; apical regular; lung fields diminished with fine crackles noted at the bases; no shortness of breath noted; bl foot dressings intact; popliteal pulses +2; pt stating some numbness in right foot-but it is not new; heparin gtt infusing through left arm iv site; no complaints at this time

## 2018-09-12 NOTE — PROGRESS NOTES
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 [facility])  Active Problems:    Essential hypertension    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 native heart without angina pectoris    Type 2 diabetes mellitus with complication, with long-term current use of insulin ([facility])    Hyperlipidemia    Gastroesophageal reflux disease without esophagitis    Ulcer of toe of left foot ([facility])    Seizure ([facility])    Anemia    PAD (peripheral artery disease) ([facility])    Elevated INR    Hypothyroidism    Rotator cuff disorder, left    Wet gangrene ([facility])      Plan/Recommendations:   Acute on chronic pain  · Acetaminophen 975mg Q8 hours  · Gabapentin 200mg BID  · CrCl 49 65  · Fentanyl patch 25mcg Q3 days  · Dilaudid 0 5mg IV Q6 hours PRN breakthrough pain  · Oxycodone 5mg Q4 hours PRN severe pain    POD#0 Exploration of left posterior tibial, medial, and lateral plantar arteries       Reviewed with Vascular Team    Pain History  Current pain location(s): left foot  Pain Scale:   FLACC zero   Severity:  n/a  24 hour history: Patient returned from PACU, remains sedated  Did not open eyes during conversation      Current Analgesic regimen:  Fentanyl 25mcg patch, Gabapentin 200mg BID, Tylenol 975mg Q8 hours, Voltaren gel four times a day for left shoulder, Dilaudid 0 5mg IV Q6 hours PRN (one dose), Oxycodone 5mg Q4 hours PRN (2 doses)   Bowel Regimen: Miralax daily, Senna-S at HS, Dulcolax PRN, fleet PRN       Meds/Allergies   all current active meds have been reviewed and current meds:   Current Facility-Administered Medications   Medication Dose Route Frequency    acetaminophen (TYLENOL) tablet 975 mg  975 mg Oral Q8H [facility]    albuterol (PROVENTIL HFA,VENTOLIN HFA) inhaler 1 puff  1 puff Inhalation Q4H PRN    amiodarone tablet 200 mg  200 mg Oral BID    amLODIPine (NORVASC) tablet 10 mg  10 mg Oral Daily    atorvastatin (LIPITOR) tablet 80 mg  80 mg Oral Daily    bisacodyl (DULCOLAX) EC tablet 5 mg  5 mg Oral Daily PRN    bisacodyl (DULCOLAX) rectal suppository 10 mg  10 mg Rectal Daily PRN    ceFAZolin (ANCEF) IVPB (premix) 1,000 mg  1,000 mg Intravenous Q12H    cholecalciferol (VITAMIN D3) tablet 1,000 Units  1,000 Units Oral Daily    diclofenac sodium (VOLTAREN) 1 % topical gel 2 g  2 g Topical 4x Daily    fentaNYL (DURAGESIC) 25 mcg/hr TD 72 hr patch 25 mcg  25 mcg Transdermal Q72H    fluticasone (FLOVENT HFA) 110 MCG/ACT inhaler 1 puff  1 puff Inhalation Q12H [address]    furosemide (LASIX) tablet 40 mg  40 mg Oral Daily    gabapentin (NEURONTIN) capsule 200 mg  200 mg Oral BID    heparin (porcine) 25,000 units in 250 mL infusion (premix)  3-20 Units/kg/hr (Order-Specific) Intravenous Titrated    heparin (porcine) injection 2,000 Units  2,000 Units Intravenous PRN    heparin (porcine) injection 4,000 Units  4,000 Units Intravenous PRN    HYDROmorphone (DILAUDID) injection 0 5 mg  0 5 mg Intravenous Q6H PRN    insulin glargine (LANTUS) subcutaneous injection 18 Units 0 18 mL  18 Units Subcutaneous BID    insulin lispro (HumaLOG) 100 units/mL subcutaneous injection 1-5 Units  1-5 Units Subcutaneous TID AC    insulin lispro (HumaLOG) 100 units/mL subcutaneous injection 1-5 Units  1-5 Units Subcutaneous HS    labetalol (NORMODYNE) injection 5 mg  5 mg Intravenous Q6H PRN    levETIRAcetam (KEPPRA) tablet 750 mg  750 mg Oral Daily    levothyroxine tablet 100 mcg  100 mcg Oral Early Morning    magnesium oxide (MAG-OX) tablet 400 mg  400 mg Oral BID    metroNIDAZOLE (FLAGYL) tablet 500 mg  500 mg Oral Q8H [address]    oxyCODONE (ROXICODONE) IR tablet 5 mg  5 mg Oral Q4H PRN    polyethylene glycol (MIRALAX) packet 17 g  17 g Oral Daily    senna-docusate sodium (SENOKOT S) 8 6-50 mg per tablet 1 tablet  1 tablet Oral HS    sodium chloride 0 9 % infusion  75 mL/hr Intravenous Continuous    sodium chloride 0 9 % infusion  20 mL/hr Intravenous Continuous    sodium phosphate-biphosphate (FLEET) enema 1 enema  1 enema Rectal Once PRN       Allergies   Allergen Reactions    Other Chest Pain     IVP-listed as "chest pain" in previous chart, patient stated this occurred "a long time ago" but does not remember exactly occurred     Contrast [Iodinated Diagnostic Agents] Other (See Comments)     Flash pulm edema    Doxycycline Chest Pain    Ketorolac Other (See Comments)     Chest pain     Levaquin [Levofloxacin] Chest Pain    Ondansetron Chest Pain     Prolonged QT    Toradol [Ketorolac Tromethamine] Chest Pain       Objective     Temp:  [97 6 °F (36 4 °C)-98 6 °F (37 °C)] 97 7 °F (36 5 °C)  HR:  [54-70] 54  Resp:  [11-20] 15  BP: (106-145)/(43-69) 121/47  Arterial Line BP: (106-138)/(38-64) 126/38      Intake/Output Summary (Last 24 hours) at 09/12/18 1440  Last data filed at 09/12/18 1340   Gross per 24 hour   Intake           524 67 ml   Output              835 ml   Net          -310 33 ml     Last Bowel Movement: Yesterday               Physical Exam: BP (!) 121/47   Pulse (!) 54   Temp 97 7 °F (36 5 °C)   Resp 15   Ht 5' 4" (1 626 m)   Wt 71 7 kg (158 lb 1 1 oz)   LMP  (LMP Unknown)   SpO2 94%   BMI 27 13 kg/m²   General Appearance:    no distress   Neurological:   Sedated    Head:    Normocephalic, without obvious abnormality, atraumatic   Eyes:    Closed   Lungs:     Respirations unlabored   Chest Wall:    No deformity   Abdomen:        Round   Extremities:   B/L LE mild edema present, b/l foot dressing intact   Skin:   Skin color pale     Lab Results:     Results from last 7 days  Lab Units 09/12/18  1003 09/12/18  0444   WBC Thousand/uL  --  11 74*   HEMOGLOBIN g/dL  --  7 3*   I STAT HEMOGLOBIN g/dl 8 5*  --    HEMATOCRIT % 25* 25 4*   PLATELETS Thousands/uL  --  290      Results from last 7 days  Lab Units 09/12/18  1003 09/12/18  0441 09/08/18  0518   SODIUM mmol/L  --  136  < > 136   POTASSIUM mmol/L  --  4 1  < > 4 6   CHLORIDE mmol/L  --  102  < > 105   CO2 mmol/L  --  26  < > 24   BUN mg/dL  --  13  < > 28*   CREATININE mg/dL  --  0 93  < > 1 09   CALCIUM mg/dL  --  8 4  < > 8 7   ALK PHOS U/L  --   --   --  80   ALT U/L  --   --   --  17   AST U/L  --   --   --  26   GLUCOSE, ISTAT mg/dl 104  --   --   --    < > = values in this interval not displayed  Counseling / Coordination of Care  Total floor / unit time spent today 15 minutes  Greater than 50% of total time was spent with the patient and / or family counseling and / or coordination of care   A description of the counseling / coordination of care: Reviewed plan of care and medications with patient, RN staff and primary care team     Rayna Alvares, MS, RN-BC  Acute Pain

## 2018-09-12 NOTE — ANESTHESIA PROCEDURE NOTES
Central Line Insertion  Performed by: Michael Rubin  Authorized by: Jesenia Craig   Date/Time: 9/12/2018 9:23 AM  Catheter Type:  triple lumen  Consent: Verbal consent obtained  Written consent obtained  Risks and benefits: risks, benefits and alternatives were discussed  Consent given by: patient  Patient understanding: patient states understanding of the procedure being performed  Patient consent: the patient's understanding of the procedure matches consent given  Procedure consent: procedure consent matches procedure scheduled  Relevant documents: relevant documents present and verified  Test results: test results available and properly labeled  Site marked: the operative site was marked  Radiology Images: Radiology Images displayed and confirmed  If images not available, report reviewed  Required items: required blood products, implants, devices, and special equipment available  Patient identity confirmed: verbally with patient, arm band, provided demographic data and hospital-assigned identification number  Time out: Immediately prior to procedure a "time out" was called to verify the correct patient, procedure, equipment, support staff and site/side marked as required  Indications: vascular access  Location details: right internal jugular  Patient position: Trendelenburg  Anesthesia: see MAR for details  Assessment: blood return through all ports and free fluid flow  Preparation: skin prepped with 2% chlorhexidine  Skin prep agent dried: skin prep agent completely dried prior to procedure  Sterile barriers: all five maximum sterile barriers used - cap, mask, sterile gown, sterile gloves, and large sterile sheet  Hand hygiene: hand hygiene performed prior to central venous catheter insertion  Ultrasound guidance: yes  sterile gel and probe cover used in ultrasound-guided central venous catheter insertionReason All Sterile Barriers Not Used:   All elements of maximal sterile barrier technique not followed for medical reasons  Pre-procedure: landmarks identified  Number of attempts: 1  Successful placement: yes  Post-procedure: dressing applied and line sutured  Patient tolerance: Patient tolerated the procedure well with no immediate complications

## 2018-09-12 NOTE — PROGRESS NOTES
Progress Note - General Surgery   Collins Mae [de-identified] y o  female MRN: [id number]  Unit/Bed#: [facility] Encounter: [id number]    Assessment:  [de-identified] y/o F w/ PAD who p/w L 1st toe wet gangrene, L 2nd toe eschar, R 3rd toe dry gangrene    Plan:  --OR today for L plantar artery exploration, poss L pop-plantar bypass  --OR 9/14 w/ Podiatry for L partial 1st ray amputation  --NPO  --Continue Heparin gtt  --Continue Abx  --Medical management per primary team    Subjective/Objective     Subjective:     No acute events overnight  Pt c/o 8/10 b/l foot pain this AM  Denies any other complaints  Objective:     Blood pressure 130/63, pulse 61, temperature 98 6 °F (37 °C), temperature source Oral, resp  rate 20, height 5' 4" (1 626 m), weight 67 1 kg (147 lb 14 9 oz), SpO2 93 %, not currently breastfeeding  ,Body mass index is 25 39 kg/m²  Intake/Output Summary (Last 24 hours) at 09/12/18 0508  Last data filed at 09/12/18 0255   Gross per 24 hour   Intake           950 06 ml   Output              250 ml   Net           700 06 ml       Invasive Devices     Peripheral Intravenous Line            Peripheral IV 09/11/18 Left;Ventral (anterior) Arm less than 1 day    Peripheral IV 09/12/18 Right Forearm less than 1 day                Physical Exam:     GEN: NAD  HEENT: MMM  CV: RRR  Lung: normal effort  Ab: Soft, NT/ND  Extrem: No CCE;  L 1st toe wet gangrene, L 2nd toe eschar, R 3rd toe dry gangrene; L foot dressing c/d/i  Neuro:  A+Ox3, motor and sensation grossly intact  Pulse exam: L doppl PT, R doppl DP/PT      Lab, Imaging and other studies:  CBC:   Lab Results   Component Value Date    WBC 13 16 (H) 09/11/2018    HGB 7 9 (L) 09/11/2018    HCT 26 9 (L) 09/11/2018    MCV 81 (L) 09/11/2018     09/11/2018    MCH 23 7 (L) 09/11/2018    MCHC 29 4 (L) 09/11/2018    RDW 19 3 (H) 09/11/2018    MPV 10 3 09/11/2018   , CMP:   Lab Results   Component Value Date     09/11/2018    K 3 8 09/11/2018     09/11/2018    CO2 26 09/11/2018    BUN 15 09/11/2018    CREATININE 0 95 09/11/2018    CALCIUM 8 3 09/11/2018    EGFR 57 09/11/2018   , Coagulation: No results found for: PT, INR, APTT, Urinalysis: No results found for: Rashmi Moulder, SPECGRAV, PHUR, LEUKOCYTESUR, NITRITE, PROTEINUA, GLUCOSEU, KETONESU, BILIRUBINUR, BLOODU, Amylase: No results found for: AMYLASE, Lipase: No results found for: LIPASE

## 2018-09-12 NOTE — PROGRESS NOTES
Patient requesting to have me call son Xavier Jimenez  Called and spoke with Xavier Jimenez  He is currently working and wont be able to come in right this second  Patient aware and said " if he knows whats good for him he will come in" patients  happened to walk in and patient is more calm now  Will cont to monitor

## 2018-09-13 ENCOUNTER — APPOINTMENT (OUTPATIENT)
Dept: WOUND CARE | Facility: HOSPITAL | Age: 78
DRG: 239 | End: 2018-09-13
Payer: MEDICARE

## 2018-09-13 LAB
ABO GROUP BLD BPU: NORMAL
ABO GROUP BLD BPU: NORMAL
ANION GAP SERPL CALCULATED.3IONS-SCNC: 6 MMOL/L (ref 4–13)
APTT PPP: 60 SECONDS (ref 24–36)
APTT PPP: 62 SECONDS (ref 24–36)
BASOPHILS # BLD AUTO: 0.02 THOUSANDS/ΜL (ref 0–0.1)
BASOPHILS NFR BLD AUTO: 0 % (ref 0–1)
BPU ID: NORMAL
BPU ID: NORMAL
BUN SERPL-MCNC: 10 MG/DL (ref 5–25)
CALCIUM SERPL-MCNC: 8.2 MG/DL (ref 8.3–10.1)
CHLORIDE SERPL-SCNC: 103 MMOL/L (ref 100–108)
CO2 SERPL-SCNC: 27 MMOL/L (ref 21–32)
CREAT SERPL-MCNC: 0.69 MG/DL (ref 0.6–1.3)
EOSINOPHIL # BLD AUTO: 0.21 THOUSAND/ΜL (ref 0–0.61)
EOSINOPHIL NFR BLD AUTO: 2 % (ref 0–6)
ERYTHROCYTE [DISTWIDTH] IN BLOOD BY AUTOMATED COUNT: 19.9 % (ref 11.6–15.1)
GFR SERPL CREATININE-BSD FRML MDRD: 82 ML/MIN/1.73SQ M
GLUCOSE SERPL-MCNC: 124 MG/DL (ref 65–140)
GLUCOSE SERPL-MCNC: 130 MG/DL (ref 65–140)
GLUCOSE SERPL-MCNC: 83 MG/DL (ref 65–140)
GLUCOSE SERPL-MCNC: 86 MG/DL (ref 65–140)
GLUCOSE SERPL-MCNC: 88 MG/DL (ref 65–140)
GLUCOSE SERPL-MCNC: 96 MG/DL (ref 65–140)
HCT VFR BLD AUTO: 25 % (ref 34.8–46.1)
HGB BLD-MCNC: 7.4 G/DL (ref 11.5–15.4)
IMM GRANULOCYTES # BLD AUTO: 0.07 THOUSAND/UL (ref 0–0.2)
IMM GRANULOCYTES NFR BLD AUTO: 1 % (ref 0–2)
LYMPHOCYTES # BLD AUTO: 1.27 THOUSANDS/ΜL (ref 0.6–4.47)
LYMPHOCYTES NFR BLD AUTO: 11 % (ref 14–44)
MCH RBC QN AUTO: 24.4 PG (ref 26.8–34.3)
MCHC RBC AUTO-ENTMCNC: 29.6 G/DL (ref 31.4–37.4)
MCV RBC AUTO: 83 FL (ref 82–98)
MONOCYTES # BLD AUTO: 1.07 THOUSAND/ΜL (ref 0.17–1.22)
MONOCYTES NFR BLD AUTO: 9 % (ref 4–12)
NEUTROPHILS # BLD AUTO: 8.94 THOUSANDS/ΜL (ref 1.85–7.62)
NEUTS SEG NFR BLD AUTO: 77 % (ref 43–75)
NRBC BLD AUTO-RTO: 0 /100 WBCS
PLATELET # BLD AUTO: 278 THOUSANDS/UL (ref 149–390)
PMV BLD AUTO: 9.9 FL (ref 8.9–12.7)
POTASSIUM SERPL-SCNC: 3.9 MMOL/L (ref 3.5–5.3)
RBC # BLD AUTO: 3.03 MILLION/UL (ref 3.81–5.12)
SODIUM SERPL-SCNC: 136 MMOL/L (ref 136–145)
UNIT DISPENSE STATUS: NORMAL
UNIT DISPENSE STATUS: NORMAL
UNIT PRODUCT CODE: NORMAL
UNIT PRODUCT CODE: NORMAL
UNIT RH: NORMAL
UNIT RH: NORMAL
WBC # BLD AUTO: 11.58 THOUSAND/UL (ref 4.31–10.16)

## 2018-09-13 PROCEDURE — 82948 REAGENT STRIP/BLOOD GLUCOSE: CPT

## 2018-09-13 PROCEDURE — 85730 THROMBOPLASTIN TIME PARTIAL: CPT | Performed by: SURGERY

## 2018-09-13 PROCEDURE — 99233 SBSQ HOSP IP/OBS HIGH 50: CPT | Performed by: INTERNAL MEDICINE

## 2018-09-13 PROCEDURE — 99024 POSTOP FOLLOW-UP VISIT: CPT | Performed by: SURGERY

## 2018-09-13 PROCEDURE — 85025 COMPLETE CBC W/AUTO DIFF WBC: CPT | Performed by: INTERNAL MEDICINE

## 2018-09-13 PROCEDURE — 80048 BASIC METABOLIC PNL TOTAL CA: CPT | Performed by: INTERNAL MEDICINE

## 2018-09-13 PROCEDURE — G0277 HBOT, FULL BODY CHAMBER, 30M: HCPCS

## 2018-09-13 RX ORDER — PROCHLORPERAZINE MALEATE 5 MG/1
5 TABLET ORAL EVERY 6 HOURS PRN
Status: DISCONTINUED | OUTPATIENT
Start: 2018-09-13 | End: 2018-09-24 | Stop reason: HOSPADM

## 2018-09-13 RX ORDER — METHOCARBAMOL 500 MG/1
250 TABLET, FILM COATED ORAL EVERY 6 HOURS PRN
Status: DISCONTINUED | OUTPATIENT
Start: 2018-09-13 | End: 2018-09-24 | Stop reason: HOSPADM

## 2018-09-13 RX ADMIN — HYDROMORPHONE HYDROCHLORIDE 0.5 MG: 1 INJECTION, SOLUTION INTRAMUSCULAR; INTRAVENOUS; SUBCUTANEOUS at 20:33

## 2018-09-13 RX ADMIN — OXYCODONE HYDROCHLORIDE 5 MG: 5 TABLET ORAL at 01:14

## 2018-09-13 RX ADMIN — POLYETHYLENE GLYCOL 3350 17 G: 17 POWDER, FOR SOLUTION ORAL at 08:25

## 2018-09-13 RX ADMIN — MAGNESIUM OXIDE TAB 400 MG (241.3 MG ELEMENTAL MG) 400 MG: 400 (241.3 MG) TAB at 18:36

## 2018-09-13 RX ADMIN — ACETAMINOPHEN 975 MG: 325 TABLET ORAL at 21:28

## 2018-09-13 RX ADMIN — VITAMIN D, TAB 1000IU (100/BT) 1000 UNITS: 25 TAB at 08:26

## 2018-09-13 RX ADMIN — INSULIN GLARGINE 18 UNITS: 100 INJECTION, SOLUTION SUBCUTANEOUS at 08:35

## 2018-09-13 RX ADMIN — AMLODIPINE BESYLATE 10 MG: 10 TABLET ORAL at 08:25

## 2018-09-13 RX ADMIN — Medication 1 TABLET: at 21:28

## 2018-09-13 RX ADMIN — FLUTICASONE PROPIONATE 1 PUFF: 110 AEROSOL, METERED RESPIRATORY (INHALATION) at 08:27

## 2018-09-13 RX ADMIN — METRONIDAZOLE 500 MG: 500 TABLET ORAL at 08:25

## 2018-09-13 RX ADMIN — METHOCARBAMOL 250 MG: 500 TABLET ORAL at 22:28

## 2018-09-13 RX ADMIN — LEVOTHYROXINE SODIUM 100 MCG: 100 TABLET ORAL at 06:45

## 2018-09-13 RX ADMIN — HYDROMORPHONE HYDROCHLORIDE 0.5 MG: 1 INJECTION, SOLUTION INTRAMUSCULAR; INTRAVENOUS; SUBCUTANEOUS at 01:58

## 2018-09-13 RX ADMIN — OXYCODONE HYDROCHLORIDE 5 MG: 5 TABLET ORAL at 18:36

## 2018-09-13 RX ADMIN — CEFEPIME HYDROCHLORIDE 2000 MG: 2 INJECTION, POWDER, FOR SOLUTION INTRAVENOUS at 18:37

## 2018-09-13 RX ADMIN — METRONIDAZOLE 500 MG: 500 TABLET ORAL at 18:00

## 2018-09-13 RX ADMIN — AMIODARONE HYDROCHLORIDE 200 MG: 200 TABLET ORAL at 18:36

## 2018-09-13 RX ADMIN — DICLOFENAC 2 G: 10 GEL TOPICAL at 08:26

## 2018-09-13 RX ADMIN — CEFAZOLIN SODIUM 1000 MG: 1 SOLUTION INTRAVENOUS at 13:13

## 2018-09-13 RX ADMIN — DICLOFENAC 2 G: 10 GEL TOPICAL at 13:12

## 2018-09-13 RX ADMIN — GABAPENTIN 200 MG: 100 CAPSULE ORAL at 18:36

## 2018-09-13 RX ADMIN — AMIODARONE HYDROCHLORIDE 200 MG: 200 TABLET ORAL at 08:26

## 2018-09-13 RX ADMIN — LEVETIRACETAM 750 MG: 750 TABLET, FILM COATED ORAL at 08:25

## 2018-09-13 RX ADMIN — METRONIDAZOLE 500 MG: 500 TABLET ORAL at 00:37

## 2018-09-13 RX ADMIN — FLUTICASONE PROPIONATE 1 PUFF: 110 AEROSOL, METERED RESPIRATORY (INHALATION) at 20:33

## 2018-09-13 RX ADMIN — DICLOFENAC 2 G: 10 GEL TOPICAL at 18:37

## 2018-09-13 RX ADMIN — HEPARIN SODIUM AND DEXTROSE 14 UNITS/KG/HR: 10000; 5 INJECTION INTRAVENOUS at 23:03

## 2018-09-13 RX ADMIN — MAGNESIUM OXIDE TAB 400 MG (241.3 MG ELEMENTAL MG) 400 MG: 400 (241.3 MG) TAB at 08:25

## 2018-09-13 RX ADMIN — GABAPENTIN 200 MG: 100 CAPSULE ORAL at 08:26

## 2018-09-13 RX ADMIN — ATORVASTATIN CALCIUM 80 MG: 80 TABLET, FILM COATED ORAL at 18:00

## 2018-09-13 RX ADMIN — ACETAMINOPHEN 975 MG: 325 TABLET ORAL at 06:45

## 2018-09-13 RX ADMIN — ACETAMINOPHEN 975 MG: 325 TABLET ORAL at 13:16

## 2018-09-13 RX ADMIN — DICLOFENAC 2 G: 10 GEL TOPICAL at 21:32

## 2018-09-13 RX ADMIN — CEFAZOLIN SODIUM 1000 MG: 1 SOLUTION INTRAVENOUS at 00:38

## 2018-09-13 RX ADMIN — HYDROMORPHONE HYDROCHLORIDE 0.5 MG: 1 INJECTION, SOLUTION INTRAMUSCULAR; INTRAVENOUS; SUBCUTANEOUS at 10:09

## 2018-09-13 RX ADMIN — OXYCODONE HYDROCHLORIDE 5 MG: 5 TABLET ORAL at 08:36

## 2018-09-13 RX ADMIN — FUROSEMIDE 40 MG: 40 TABLET ORAL at 08:26

## 2018-09-13 NOTE — PROGRESS NOTES
[facility] - Internal Medicine Progress Note      PATIENT INFORMATION      Patient: Saroj Ferraro [de-identified] y o  female   MRN: [id number]  PCP: Stefany Beasley MD  Unit/Bed#: [facility] 527-01 Encounter: [id number]  Date Of Visit: 18  Current Length of Stay: 6 day(s)     ASSESSMENTS & PLAN        1   Severe peripheral vascular disease  Involving both feet but mainly left great toe   Dry gangrene with concern for wet gangrene of left great toe      · Plan per Podiatry/Vascular  · Wound culture of little utility, likely colonization  Previously evaluated by Infectious Disease  · Analgesia:  Plan per Acute Pain Service     2   History of DVT/PE  · Heparin gtt  · Hold SCDs in setting of chronic lower extremity wounds     3   Coronary artery disease status post CABG  · Continue atorvastatin/aspirin     4   Paroxysmal AFib  · Continue amiodarone 20 mg b i d  rate control  · Anticoagulation as above     5   Chronic systolic heart failure (without exacerbation)  · Cont Lasix         6   Elevated INR secondary to her Xarelto  · Anticoagulation as above     7   Hypertension  · Continue Amlodipine  · Lisinopril held  · IV labetalol 5 mg q 6 hours p r n      8   Acute kidney injury  · Resolved     9   Type 2 diabetes mellitus  · Lantus and sliding scale insulin    VTE Pharmacologic Prophylaxis: Heparin   VTE Mechanical Prophylaxis: SCD's      SUBJECTIVE     No new complaints, no overnight events  OBJECTIVE     Vitals:   Temp (24hrs), Av °F (36 7 °C), Min:97 3 °F (36 3 °C), Max:98 5 °F (36 9 °C)    HR:  [54-70] 54  Resp:  [11-20] 18  BP: (106-145)/(43-69) 139/63  SpO2:  [89 %-97 %] 97 %  Body mass index is 27 13 kg/m²  Input and Output Summary (last 24 hours):        Intake/Output Summary (Last 24 hours) at 18 0707  Last data filed at 18 0147   Gross per 24 hour   Intake           473 51 ml   Output             1185 ml   Net          -711 49 ml       Physical Exam:   GENERAL: NAD  HEENT:  NC/AT, PERRL, EOMI, MMM, no scleral icterus  CARDIAC:  RRR, +S1/S2, no S3/S4 heard, no m/g/r  PULMONARY:  CTA B/L, no wheezing/rales/rhonci, non-labored breathing  ABDOMEN:  Soft, NT/ND, +BS, no rebound/guarding/rigidity  Extremities:  No change, wrapped            ADDITIONAL DATA     Labs & Recent Cultures:       Results from last 7 days  Lab Units 09/12/18  1003 09/12/18  0444   WBC Thousand/uL  --  11 74*   HEMOGLOBIN g/dL  --  7 3*   I STAT HEMOGLOBIN g/dl 8 5*  --    HEMATOCRIT % 25* 25 4*   PLATELETS Thousands/uL  --  290   NEUTROS PCT %  --  71   LYMPHS PCT %  --  14   MONOS PCT %  --  11   EOS PCT %  --  3       Results from last 7 days  Lab Units 09/13/18  0634 09/12/18  1003  09/08/18  0518   SODIUM mmol/L 136  --   < > 136   POTASSIUM mmol/L 3 9  --   < > 4 6   CHLORIDE mmol/L 103  --   < > 105   CO2 mmol/L 27  --   < > 24   BUN mg/dL 10  --   < > 28*   CREATININE mg/dL 0 69  --   < > 1 09   CALCIUM mg/dL 8 2*  --   < > 8 7   ALK PHOS U/L  --   --   --  80   ALT U/L  --   --   --  17   AST U/L  --   --   --  26   GLUCOSE, ISTAT mg/dl  --  104  --   --    < > = values in this interval not displayed      Results from last 7 days  Lab Units 09/09/18  1157   INR  1 52*           Results from last 7 days  Lab Units 09/11/18  0845   GRAM STAIN RESULT  No polys seen  3+ Gram negative rods  1+ Gram positive cocci in pairs   WOUND CULTURE  4+ Growth of Pseudomonas aeruginosa*  4+ Growth of Klebsiella oxytoca*  4+ Growth of Staphylococcus aureus*         Last 24 Hours Medication List:     Current Facility-Administered Medications:  acetaminophen 975 mg Oral Q8H [address] Nasreen Montenegro PA-C    albuterol 1 puff Inhalation Q4H PRN Judith Paula MD    amiodarone 200 mg Oral BID Judith Paula MD    amLODIPine 10 mg Oral Daily Judith Paula MD    atorvastatin 80 mg Oral Daily Judith Paula MD    bisacodyl 5 mg Oral Daily PRN Davis Kinsey MD    bisacodyl 10 mg Rectal Daily PRN Cindy Montenegro PA-C    cefazolin 1,000 mg Intravenous Q12H Fauzia Tucker MD Last Rate: 1,000 mg (09/13/18 0038)   cholecalciferol 1,000 Units Oral Daily Fauzia Tucker MD    diclofenac sodium 2 g Topical 4x Daily Davis Kinsey MD    fentaNYL 25 mcg Transdermal Q72H Davis Kinsey MD    fluticasone 1 puff Inhalation Q12H [address] Fauzia Tucker MD    furosemide 40 mg Oral Daily Davis Kinsey MD    gabapentin 200 mg Oral BID Nasreen Montenegro PA-C    heparin (porcine) 3-20 Units/kg/hr (Order-Specific) Intravenous Titrated Carlton Kinsey MD Last Rate: 14 Units/kg/hr (09/12/18 1751)   heparin (porcine) 2,000 Units Intravenous PRN Carlton Kinsey MD    heparin (porcine) 4,000 Units Intravenous PRN Carlton Kinsey MD    HYDROmorphone 0 5 mg Intravenous Q6H PRN Davis Kinsey MD    insulin glargine 18 Units Subcutaneous BID Fauzia Tucker MD    insulin lispro 1-5 Units Subcutaneous TID AC Arvil [place], DO    insulin lispro 1-5 Units Subcutaneous HS Arvil [place], DO    labetalol 5 mg Intravenous Q6H PRN Arvil [place], DO    levETIRAcetam 750 mg Oral Daily Fauzia Tucker MD    levothyroxine 100 mcg Oral Early Morning Fauzia Tucker MD    magnesium oxide 400 mg Oral BID Fazuia Tucker MD    metroNIDAZOLE 500 mg Oral Pending sale to [facility] Bola Rodriguez DPM    oxyCODONE 5 mg Oral Q4H PRN Davis Kinsey MD    polyethylene glycol 17 g Oral Daily Davis Kinsey MD    senna-docusate sodium 1 tablet Oral HS Davis Kinsey MD    sodium chloride 75 mL/hr Intravenous Continuous Vito Riedel, PA-C Last Rate: 75 mL/hr (09/12/18 0134)   sodium chloride 20 mL/hr Intravenous Continuous Juliet Montgomery CRNA Last Rate: 20 mL/hr (09/12/18 1751)   sodium phosphate-biphosphate 1 enema Rectal Once PRN Vito Riedel, PA-C           Time Spent for Care: 30 mins spent in total   More than 50% of total time spent on counseling and coordination of care as described above        Current Length of Stay: 6 day(s)      Code Status: Level 3 - DNAR and DNI Veronica Ryan    ** Please Note: This note is constructed using a voice recognition dictation system   **

## 2018-09-13 NOTE — PROGRESS NOTES
Will transfer pt to vascular surgery service as primary provider  Spoke with Vascular Surgery resident, accepting attending will be Dr Padilla Flores  Patient is here primarily for peripheral vascular disease  No active medical conditions primarily  Acute kidney injury has resolved  We do not believe patient is infected or has cellulitis as infectious Disease previously evaluated left great toe drainage and deemed wound cultures as colonization and stopped antibiotics  All other issues are stable at this time  Pt now having vascular surgery at [facility]  We will follow along

## 2018-09-13 NOTE — PROGRESS NOTES
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)  Active Problems:    Essential hypertension    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 native heart without angina pectoris    Type 2 diabetes mellitus with complication, with long-term current use of insulin ([facility])    Hyperlipidemia    Gastroesophageal reflux disease without esophagitis    Ulcer of toe of left foot ([facility])    Seizure ([facility])    Anemia    PAD (peripheral artery disease) ([facility])    Elevated INR    Hypothyroidism    Rotator cuff disorder, left    Wet gangrene ([facility])      Plan/Recommendations:   Acute on chronic pain  · Acetaminophen 975mg Q8 hours  · Gabapentin 200mg BID  · CrCl 49 65  · Fentanyl patch 25mcg Q3 days  · Patch must be removed prior to hyperbaric treatment   · Dilaudid 0 5mg IV Q6 hours PRN breakthrough pain  · If severe pain, ok to increase frequency of IV Dilaudid to 0 5mg Q4 hours PRN   · Oxycodone 5mg Q4 hours PRN severe pain  · Add Robaxin 250mg Q6 hours PRN LE spasms      Reviewed with SOD-C and Vascular Team    Pain History  Current pain location(s): left foot  Pain Scale:  6-10  Severity:  Severe  24 hour history: Patient resting in bed, awake and alert today  Reporting severe left foot pain and mild right foot discomfort  States she is getting frequency spasms in the LLE/Foot  Tolerating medication regimen  States she does not want any surgery on her foot and stated "i would go home, get my medicare check, buy a gun and shoot myself " Denied active thoughts to harm self   Reported to SOD team      Current Analgesic regimen:  Fentanyl 25mcg patch, Gabapentin 200mg BID, Tylenol 975mg Q8 hours, Voltaren gel four times a day for left shoulder, Dilaudid 0 5mg IV Q6 hours PRN (2 doses), Oxycodone 5mg Q4 hours PRN (3 doses)   Bowel Regimen: Miralax daily, Senna-S at HS, Dulcolax PRN, fleet PRN       Meds/Allergies   all current active meds have been reviewed and current meds:   Current Facility-Administered Medications   Medication Dose Route Frequency    acetaminophen (TYLENOL) tablet 975 mg  975 mg Oral Q8H [address]    albuterol (PROVENTIL HFA,VENTOLIN HFA) inhaler 1 puff  1 puff Inhalation Q4H PRN    amiodarone tablet 200 mg  200 mg Oral BID    amLODIPine (NORVASC) tablet 10 mg  10 mg Oral Daily    atorvastatin (LIPITOR) tablet 80 mg  80 mg Oral Daily    bisacodyl (DULCOLAX) EC tablet 5 mg  5 mg Oral Daily PRN    bisacodyl (DULCOLAX) rectal suppository 10 mg  10 mg Rectal Daily PRN    ceFAZolin (ANCEF) IVPB (premix) 1,000 mg  1,000 mg Intravenous Q12H    cholecalciferol (VITAMIN D3) tablet 1,000 Units  1,000 Units Oral Daily    diclofenac sodium (VOLTAREN) 1 % topical gel 2 g  2 g Topical 4x Daily    fentaNYL (DURAGESIC) 25 mcg/hr TD 72 hr patch 25 mcg  25 mcg Transdermal Q72H    fluticasone (FLOVENT HFA) 110 MCG/ACT inhaler 1 puff  1 puff Inhalation Q12H [address]    furosemide (LASIX) tablet 40 mg  40 mg Oral Daily    gabapentin (NEURONTIN) capsule 200 mg  200 mg Oral BID    heparin (porcine) 25,000 units in 250 mL infusion (premix)  3-20 Units/kg/hr (Order-Specific) Intravenous Titrated    heparin (porcine) injection 2,000 Units  2,000 Units Intravenous PRN    heparin (porcine) injection 4,000 Units  4,000 Units Intravenous PRN    HYDROmorphone (DILAUDID) injection 0 5 mg  0 5 mg Intravenous Q6H PRN    insulin glargine (LANTUS) subcutaneous injection 18 Units 0 18 mL  18 Units Subcutaneous BID    insulin lispro (HumaLOG) 100 units/mL subcutaneous injection 1-5 Units  1-5 Units Subcutaneous TID AC    insulin lispro (HumaLOG) 100 units/mL subcutaneous injection 1-5 Units  1-5 Units Subcutaneous HS    labetalol (NORMODYNE) injection 5 mg  5 mg Intravenous Q6H PRN    levETIRAcetam (KEPPRA) tablet 750 mg  750 mg Oral Daily    levothyroxine tablet 100 mcg  100 mcg Oral Early Morning    magnesium oxide (MAG-OX) tablet 400 mg  400 mg Oral BID    metroNIDAZOLE (FLAGYL) tablet 500 mg  500 mg Oral Q8H [address]    oxyCODONE (ROXICODONE) IR tablet 5 mg  5 mg Oral Q4H PRN    polyethylene glycol (MIRALAX) packet 17 g  17 g Oral Daily    senna-docusate sodium (SENOKOT S) 8 6-50 mg per tablet 1 tablet  1 tablet Oral HS    sodium chloride 0 9 % infusion  75 mL/hr Intravenous Continuous    sodium chloride 0 9 % infusion  20 mL/hr Intravenous Continuous    sodium phosphate-biphosphate (FLEET) enema 1 enema  1 enema Rectal Once PRN       Allergies   Allergen Reactions    Other Chest Pain     IVP-listed as "chest pain" in previous chart, patient stated this occurred "a long time ago" but does not remember exactly occurred     Contrast [Iodinated Diagnostic Agents] Other (See Comments)     Flash pulm edema    Doxycycline Chest Pain    Ketorolac Other (See Comments)     Chest pain     Levaquin [Levofloxacin] Chest Pain    Ondansetron Chest Pain     Prolonged QT    Toradol [Ketorolac Tromethamine] Chest Pain       Objective     Temp:  [97 3 °F (36 3 °C)-98 6 °F (37 °C)] 98 6 °F (37 °C)  HR:  [54-64] 60  Resp:  [11-20] 18  BP: (106-140)/(43-65) 140/49  Arterial Line BP: (106-136)/(38-54) 126/38      Intake/Output Summary (Last 24 hours) at 09/13/18 1234  Last data filed at 09/13/18 1156   Gross per 24 hour   Intake           810 31 ml   Output             1110 ml   Net          -299 69 ml     Last Bowel Movement: Yesterday               Physical Exam: BP (!) 140/49   Pulse 60   Temp 98 6 °F (37 °C)   Resp 18   Ht 5' 4" (1 626 m)   Wt 71 7 kg (158 lb 1 1 oz)   LMP  (LMP Unknown)   SpO2 97%   BMI 27 13 kg/m²   General Appearance:    Awake and Alert, NAD   Neurological:   Oriented, forgetful at times   Head:    Normocephalic, without obvious abnormality, atraumatic   Eyes:    EOM intact    Lungs:     Respirations unlabored   Chest Wall:    No deformity   Abdomen:        Round   Extremities:   B/L LE mild edema present, b/l foot dressing intact   Skin:   Skin color pale     Lab Results:     Results from last 7 days  Lab Units 09/13/18  0634   WBC Thousand/uL 11 58*   HEMOGLOBIN g/dL 7 4*   HEMATOCRIT % 25 0*   PLATELETS Thousands/uL 278      Results from last 7 days  Lab Units 09/13/18  0634 09/12/18  1003  09/08/18  0518   SODIUM mmol/L 136  --   < > 136   POTASSIUM mmol/L 3 9  --   < > 4 6   CHLORIDE mmol/L 103  --   < > 105   CO2 mmol/L 27  --   < > 24   BUN mg/dL 10  --   < > 28*   CREATININE mg/dL 0 69  --   < > 1 09   CALCIUM mg/dL 8 2*  --   < > 8 7   ALK PHOS U/L  --   --   --  80   ALT U/L  --   --   --  17   AST U/L  --   --   --  26   GLUCOSE, ISTAT mg/dl  --  104  --   --    < > = values in this interval not displayed  Counseling / Coordination of Care  Total floor / unit time spent today 15 minutes  Greater than 50% of total time was spent with the patient and / or family counseling and / or coordination of care   A description of the counseling / coordination of care: Reviewed plan of care and medications with patient, RN staff and primary care team     Oralia Ortega MS, RN-BC  Acute Pain

## 2018-09-13 NOTE — PROGRESS NOTES
Progress Note - Vascular Surgery   Gina Ulloa [de-identified] y o  female MRN: [id number]  Unit/Bed#: [facility] 527-01 Encounter: [id number]    Assessment/Plan:  [de-identified] y/o F w/ PAD who p/w L 1st toe wet gangrene, L 2nd toe eschar, R 3rd toe dry gangrene s/p L plantar exploration w/o suitable distal target [place] [facility], 9/12)     Plan:  --Will require L BKA  --Regular diet w/ Ensures  --Continue Heparin gtt  --Continue Abx, 9/11 Wcx grew Pseudomonas, recommend reassessing coverage  --Medical management per primary team    Subjective/Objective     Subjective: No acute events overnight  Patient complaining of continued foot pain this AM, no change in quality or severity  No new complaints  Objective:     Vitals: Blood pressure 127/50, pulse 59, temperature 98 4 °F (36 9 °C), temperature source Oral, resp  rate 18, height 5' 4" (1 626 m), weight 71 7 kg (158 lb 1 1 oz), SpO2 96 %, not currently breastfeeding  ,Body mass index is 27 13 kg/m²  I/O       09/11 0701 - 09/12 0700 09/12 0701 - 09/13 0700    P  O  600     I V  (mL/kg) 174 7 (2 4) 473 5 (6 6)    IV Piggyback 50     Total Intake(mL/kg) 824 7 (11 5) 473 5 (6 6)    Urine (mL/kg/hr) 450 (0 3) 1185 (0 7)    Total Output 450 1185    Net +374 7 -711 5          Unmeasured Urine Occurrence 1 x     Unmeasured Stool Occurrence 1 x 1 x          Physical Exam:  GEN: NAD  HEENT: MMM  CV: RRR  Lung: Normal effort  Ab: Soft, NT/ND  Extrem: No CCE L 1st toe wet gangrene, L 2nd toe eschar, R 3rd toe dry gangrene; b/l foot dressings c/d/i  Neuro:  A+Ox3  Pulse exam: L DP signal, R DP/PT signals    Lab, Imaging and other studies:   Lab Results   Component Value Date    GLUCOSE 104 09/12/2018    CALCIUM 8 4 09/12/2018     09/12/2018    K 4 1 09/12/2018    CO2 26 09/12/2018     09/12/2018    BUN 13 09/12/2018    CREATININE 0 93 09/12/2018     Lab Results   Component Value Date    WBC 11 74 (H) 09/12/2018    HGB 8 5 (L) 09/12/2018    HCT 25 (L) 09/12/2018    MCV 82 09/12/2018     09/12/2018     VTE Pharmacologic Prophylaxis: Heparin  VTE Mechanical Prophylaxis: reason for no mechanical VTE prophylaxis wounds

## 2018-09-13 NOTE — SOCIAL WORK
Patient identified as HRR per criteria  Call made to DC appointment hotline with information as required for CM support follow up  Referral made to outpatient cm

## 2018-09-13 NOTE — PROGRESS NOTES
Progress Note - Podiatry  Heidya Officer [de-identified] y o  female MRN: [id number]  Unit/Bed#: [facility] 527-01 Encounter: [id number]    Assessment/Plan:  1  Left hallux dry gangrene with associated cellulitis  2  Dry gangrene to right 3rd and medial 1st MPJ  3  DTI right medial hallux  4  Eschar left 2nd toe  5  Peripheral arterial disease  6  AK I  7  Dm type 2    -wounds continue to be stable without any acute signs of infection  -discussed with vascular:  Patient will undergo HBO therapy mostly for right foot as there is some flow in the peroneals however will plan for left BKA versus left AKA in near future  -will not be taking patient for left partial 1st ray amputation tomorrow per vascular recommendations  -nursing to apply Betadine paint daily  -podiatry to see next 9/17    Subjective/Objective   Chief Complaint:   Chief Complaint   Patient presents with    Foot Ulcer     PER ems, "FROM [facility], SENT FOR EVAL OF BILAT TOE INFECTIONS, ONGOING PROBLEM"       Subjective: [de-identified] y o  y/o female was seen and evaluated at bedside  Blood pressure (!) 140/49, pulse 60, temperature 98 6 °F (37 °C), resp  rate 18, height 5' 4" (1 626 m), weight 71 7 kg (158 lb 1 1 oz), SpO2 97 %, not currently breastfeeding  ,Body mass index is 27 13 kg/m²  Invasive Devices     Central Venous Catheter Line            CVC Central Lines 09/12/18 Triple 1 day          Peripheral Intravenous Line            Peripheral IV 09/11/18 Left;Ventral (anterior) Arm 1 day    Peripheral IV 09/12/18 Right Forearm 1 day                Physical Exam:   General: Alert, cooperative and no distress  Lungs: Non labored breathing  Heart: Positive S1, S2  Abdomen: Soft, non-tender  Extremity:       Neurovascular status gross motor function at baseline  Wounds continue to be stable dry gangrene without acute signs of infection      Lab, Imaging and other studies:   CBC:   Lab Results   Component Value Date    WBC 11 58 (H) 09/13/2018 HGB 7 4 (L) 09/13/2018    HCT 25 0 (L) 09/13/2018    MCV 83 09/13/2018     09/13/2018    MCH 24 4 (L) 09/13/2018    MCHC 29 6 (L) 09/13/2018    RDW 19 9 (H) 09/13/2018    MPV 9 9 09/13/2018    NRBC 0 09/13/2018       Imaging: I have personally reviewed pertinent films in PACS  EKG, Pathology, and Other Studies: I have personally reviewed pertinent reports    VTE Pharmacologic Prophylaxis: Heparin

## 2018-09-13 NOTE — MEDICAL STUDENT
MEDICAL STUDENT  Inpatient Progress Note for TRAINING ONLY  Not Part of Legal Medical Record       Progress Note - Joel Bravo [de-identified] y o  female MRN: [id number]    Unit/Bed#: [facility] 527-01 Encounter: [id number]      Assessment:  [de-identified] y/o F p/w L 1st toe dry gangrene 2/2 to PVD s/p surgical exploration of L plantar artery, no suitable distal target found  Patient is in significant distress 2/2 to surgery  Plan:  Dry gangrene of L 1st toe 2/2 to peripheral vascular disease  - Patient is s/p exploration of the L plantar artery  Per vascular surgery, no suitable distal target was found  - Vascular recommends L BKA  Patient is refusing amputation and seems to be in significant distress  Important to sit and talk with her about the pros/cons regarding the amputation if she is willing to listen  History of DVT/PE  - Continue heparin  CAD s/p CABG  - Continue atorvastatin/aspirin  Paroxysmal Afib  - Continue amiodarone and heparin  HTN and DM type 2  - Continue amlodipine, IV labetalol; lisinopril has been held due to LEONARD  - Continue Lantus and insulin as needed  Subjective:   Mrs Anselmo Boo is an [de-identified] y F who presented to the hospital with dry gangrene of the 1st L toe  She has an extensive PMHx relevant for PAD, HTN, CAD s/p CABG, type 2 DM, MI, chronic foot ulcers, asthma, CVA, pHTN  She presented to the ED on 9/7 for worsening bilateral foot pain and discoloration on the L foot  She was found to have dry gangrene of the 1st L toe, L 2nd toe eschar, and R 3rd toe dry gangrene  She initially denied BKA and sought a second opinion, where she was scheduled to have surgery on 9/12 for L leg bypass  Today, she is s/p surgical exploration of the L plantar artery w/o suitable distal target  She has had no acute events overnight and reports that the pain in her L foot is a 10/10, the worst it has been  She seems to be confused and is not oriented to time  She denies SOB, chest pain, N/V, F/Cs, and has not passed gas   She is refusing a L BKA  Objective:     Vitals: Blood pressure 134/65, pulse 60, temperature 98 °F (36 7 °C), resp  rate 20, height 5' 4" (1 626 m), weight 71 7 kg (158 lb 1 1 oz), SpO2 97 %, not currently breastfeeding  ,Body mass index is 27 13 kg/m²  Intake/Output Summary (Last 24 hours) at 09/13/18 0846  Last data filed at 09/13/18 0147   Gross per 24 hour   Intake           473 51 ml   Output             1185 ml   Net          -711 49 ml       Physical Exam: General appearance: alert and distracted  Lungs: clear to auscultation bilaterally  Heart: regular rate and rhythm, S1, S2 normal, no murmur, click, rub or gallop  Abdomen: soft, non-tender; bowel sounds normal; no masses,  no organomegaly  Extremities: L 1st toe dry gangrene, L 2nd toe eschar, R 3rd toe dry gangrene  Skin: Skin color, texture, turgor normal  No rashes or lesions or dry gangrene of L and R toes    Neurologic: Mental status: Alert, oriented, thought content appropriate, orientation: place, president  Sensory: vibratory sense absent feet bilateral bilaterally     Invasive Devices     Central Venous Catheter Line            CVC Central Lines 09/12/18 Triple less than 1 day          Peripheral Intravenous Line            Peripheral IV 09/11/18 Left;Ventral (anterior) Arm 1 day    Peripheral IV 09/12/18 Right Forearm 1 day                Lab, Imaging and other studies:  Recent Results (from the past 24 hour(s))   POCT activated clotting time    Collection Time: 09/12/18  9:56 AM   Result Value Ref Range    Activated Clotting Time, i-STAT 155 (H) 89 - 137 sec    Specimen Type ARTERIAL    POCT Blood Gas (CG8+)    Collection Time: 09/12/18 10:03 AM   Result Value Ref Range    pH, Art i-STAT 7 450 7 350 - 7 450    pCO2, Art i-STAT 37 7 36 0 - 44 0 mm HG    pO2, ART i-STAT 203 0 (H) 75 0 - 129 0 mm HG    BE, i-STAT 2 -2 - 3 mmol/L    HCO3, Art i-STAT 26 2 22 0 - 28 0 mmol/L    CO2, i-STAT 27 21 - 32 mmol/L    O2 Sat, i-STAT 100 (H) 95 - 98 % SODIUM, I-STAT 136 136 - 145 mmol/l    Potassium, i-STAT 3 6 3 5 - 5 3 mmol/L    Calcium, Ionized i-STAT 1 11 (L) 1 12 - 1 32 mmol/L    Hct, i-STAT 25 (L) 34 8 - 46 1 %    Hgb, i-STAT 8 5 (L) 11 5 - 15 4 g/dl    Glucose, i-STAT 104 65 - 140 mg/dl    Specimen Type ARTERIAL    Fingerstick Glucose (POCT)    Collection Time: 09/12/18 12:19 PM   Result Value Ref Range    POC Glucose 109 65 - 140 mg/dl   Fingerstick Glucose (POCT)    Collection Time: 09/12/18  4:01 PM   Result Value Ref Range    POC Glucose 101 65 - 140 mg/dl   APTT    Collection Time: 09/12/18  4:06 PM   Result Value Ref Range    PTT 40 (H) 24 - 36 seconds   Fingerstick Glucose (POCT)    Collection Time: 09/12/18  9:09 PM   Result Value Ref Range    POC Glucose 132 65 - 140 mg/dl   APTT    Collection Time: 09/13/18 12:47 AM   Result Value Ref Range    PTT 62 (H) 24 - 36 seconds   CBC and differential    Collection Time: 09/13/18  6:34 AM   Result Value Ref Range    WBC 11 58 (H) 4 31 - 10 16 Thousand/uL    RBC 3 03 (L) 3 81 - 5 12 Million/uL    Hemoglobin 7 4 (L) 11 5 - 15 4 g/dL    Hematocrit 25 0 (L) 34 8 - 46 1 %    MCV 83 82 - 98 fL    MCH 24 4 (L) 26 8 - 34 3 pg    MCHC 29 6 (L) 31 4 - 37 4 g/dL    RDW 19 9 (H) 11 6 - 15 1 %    MPV 9 9 8 9 - 12 7 fL    Platelets 991 117 - 987 Thousands/uL    nRBC 0 /100 WBCs    Neutrophils Relative 77 (H) 43 - 75 %    Immat GRANS % 1 0 - 2 %    Lymphocytes Relative 11 (L) 14 - 44 %    Monocytes Relative 9 4 - 12 %    Eosinophils Relative 2 0 - 6 %    Basophils Relative 0 0 - 1 %    Neutrophils Absolute 8 94 (H) 1 85 - 7 62 Thousands/µL    Immature Grans Absolute 0 07 0 00 - 0 20 Thousand/uL    Lymphocytes Absolute 1 27 0 60 - 4 47 Thousands/µL    Monocytes Absolute 1 07 0 17 - 1 22 Thousand/µL    Eosinophils Absolute 0 21 0 00 - 0 61 Thousand/µL    Basophils Absolute 0 02 0 00 - 0 10 Thousands/µL   Basic metabolic panel    Collection Time: 09/13/18  6:34 AM   Result Value Ref Range    Sodium 136 136 - 145 mmol/L Potassium 3 9 3 5 - 5 3 mmol/L    Chloride 103 100 - 108 mmol/L    CO2 27 21 - 32 mmol/L    ANION GAP 6 4 - 13 mmol/L    BUN 10 5 - 25 mg/dL    Creatinine 0 69 0 60 - 1 30 mg/dL    Glucose 86 65 - 140 mg/dL    Calcium 8 2 (L) 8 3 - 10 1 mg/dL    eGFR 82 ml/min/1 73sq m   APTT    Collection Time: 09/13/18  6:34 AM   Result Value Ref Range    PTT 60 (H) 24 - 36 seconds   Fingerstick Glucose (POCT)    Collection Time: 09/13/18  6:36 AM   Result Value Ref Range    POC Glucose 83 65 - 140 mg/dl   Prepare RBC:Special Requirements: None; Has consent been obtained? Yes; Date of Surgery: 9/12/2018; Where is the Surgery Scheduled? [facility]    Collection Time: 09/13/18  8:19 AM   Result Value Ref Range    Unit Product Code E2551T96     Unit Number [id number]     Unit ABO O     Unit [facility] POS     Unit Dispense Status Return to [facility]     Unit Product Code G5602G95     Unit Number [id number]     Unit ABO O     Unit RH POS     Unit Dispense Status Return to [facility]      XR chest portable   Final Result by Evelin Chung DO (09/12 1253)   1  Diminished inspiration  Mild vascular congestion  2   Right internal jugular central line in satisfactory position  Workstation performed: [id number]         XR chest pa & lateral   Final Result by Marcio Wright MD (09/08 8435)      Mild pulmonary vascular congestion and cardiomegaly raise concern for CHF  Workstation performed: [id number]         XR foot 3+ vw left   Final Result by Therese Craven MD (09/07 1992)      Diffuse swelling throughout the foot  No evidence of gas or collection  No radiographic evidence of osteomyelitis  Workstation performed: [id number]         XR shoulder 2+ views LEFT   ED Interpretation by Nona Rain MD (09/07 3758)   The shoulder was ordered by resident and interpreted by me independently     On my read, it appears:   - arthritic changes   - no obvious fracture      Final Result by Peg Norris MD (09/07 1443)      Relatively prominent ossific density projecting between acromion and humeral head felt to most likely be a large bone spur  This might be impinging on rotator cuff    Suggest further workup with MRI            Workstation performed: [id number]             VTE Pharmacologic Prophylaxis: Heparin  VTE Mechanical Prophylaxis: reason for no mechanical VTE prophylaxis gangrene of feet bilaterally

## 2018-09-14 LAB
ABO GROUP BLD BPU: NORMAL
ABO GROUP BLD BPU: NORMAL
ANION GAP SERPL CALCULATED.3IONS-SCNC: 7 MMOL/L (ref 4–13)
APTT PPP: 53 SECONDS (ref 24–36)
APTT PPP: 61 SECONDS (ref 24–36)
APTT PPP: 73 SECONDS (ref 24–36)
BASOPHILS # BLD AUTO: 0.03 THOUSANDS/ΜL (ref 0–0.1)
BASOPHILS NFR BLD AUTO: 0 % (ref 0–1)
BPU ID: NORMAL
BPU ID: NORMAL
BUN SERPL-MCNC: 10 MG/DL (ref 5–25)
CALCIUM SERPL-MCNC: 8.1 MG/DL (ref 8.3–10.1)
CHLORIDE SERPL-SCNC: 105 MMOL/L (ref 100–108)
CO2 SERPL-SCNC: 26 MMOL/L (ref 21–32)
CREAT SERPL-MCNC: 0.7 MG/DL (ref 0.6–1.3)
EOSINOPHIL # BLD AUTO: 0.22 THOUSAND/ΜL (ref 0–0.61)
EOSINOPHIL NFR BLD AUTO: 2 % (ref 0–6)
ERYTHROCYTE [DISTWIDTH] IN BLOOD BY AUTOMATED COUNT: 20 % (ref 11.6–15.1)
GFR SERPL CREATININE-BSD FRML MDRD: 82 ML/MIN/1.73SQ M
GLUCOSE SERPL-MCNC: 141 MG/DL (ref 65–140)
GLUCOSE SERPL-MCNC: 180 MG/DL (ref 65–140)
GLUCOSE SERPL-MCNC: 195 MG/DL (ref 65–140)
GLUCOSE SERPL-MCNC: 83 MG/DL (ref 65–140)
GLUCOSE SERPL-MCNC: 84 MG/DL (ref 65–140)
HCT VFR BLD AUTO: 22.7 % (ref 34.8–46.1)
HCT VFR BLD AUTO: 32.6 % (ref 34.8–46.1)
HGB BLD-MCNC: 10.2 G/DL (ref 11.5–15.4)
HGB BLD-MCNC: 6.5 G/DL (ref 11.5–15.4)
IMM GRANULOCYTES # BLD AUTO: 0.09 THOUSAND/UL (ref 0–0.2)
IMM GRANULOCYTES NFR BLD AUTO: 1 % (ref 0–2)
LYMPHOCYTES # BLD AUTO: 1.07 THOUSANDS/ΜL (ref 0.6–4.47)
LYMPHOCYTES NFR BLD AUTO: 9 % (ref 14–44)
MCH RBC QN AUTO: 24 PG (ref 26.8–34.3)
MCHC RBC AUTO-ENTMCNC: 28.6 G/DL (ref 31.4–37.4)
MCV RBC AUTO: 84 FL (ref 82–98)
MONOCYTES # BLD AUTO: 1.18 THOUSAND/ΜL (ref 0.17–1.22)
MONOCYTES NFR BLD AUTO: 10 % (ref 4–12)
NEUTROPHILS # BLD AUTO: 9.56 THOUSANDS/ΜL (ref 1.85–7.62)
NEUTS SEG NFR BLD AUTO: 78 % (ref 43–75)
NRBC BLD AUTO-RTO: 0 /100 WBCS
PLATELET # BLD AUTO: 249 THOUSANDS/UL (ref 149–390)
PMV BLD AUTO: 10.4 FL (ref 8.9–12.7)
POTASSIUM SERPL-SCNC: 3.6 MMOL/L (ref 3.5–5.3)
RBC # BLD AUTO: 2.71 MILLION/UL (ref 3.81–5.12)
SODIUM SERPL-SCNC: 138 MMOL/L (ref 136–145)
UNIT DISPENSE STATUS: NORMAL
UNIT DISPENSE STATUS: NORMAL
UNIT PRODUCT CODE: NORMAL
UNIT PRODUCT CODE: NORMAL
UNIT RH: NORMAL
UNIT RH: NORMAL
WBC # BLD AUTO: 12.15 THOUSAND/UL (ref 4.31–10.16)

## 2018-09-14 PROCEDURE — 85014 HEMATOCRIT: CPT | Performed by: SURGERY

## 2018-09-14 PROCEDURE — 82948 REAGENT STRIP/BLOOD GLUCOSE: CPT

## 2018-09-14 PROCEDURE — 99233 SBSQ HOSP IP/OBS HIGH 50: CPT | Performed by: INTERNAL MEDICINE

## 2018-09-14 PROCEDURE — 85025 COMPLETE CBC W/AUTO DIFF WBC: CPT | Performed by: INTERNAL MEDICINE

## 2018-09-14 PROCEDURE — 99024 POSTOP FOLLOW-UP VISIT: CPT | Performed by: SURGERY

## 2018-09-14 PROCEDURE — 85018 HEMOGLOBIN: CPT | Performed by: SURGERY

## 2018-09-14 PROCEDURE — P9016 RBC LEUKOCYTES REDUCED: HCPCS

## 2018-09-14 PROCEDURE — 85730 THROMBOPLASTIN TIME PARTIAL: CPT | Performed by: SURGERY

## 2018-09-14 PROCEDURE — 30233N1 TRANSFUSION OF NONAUTOLOGOUS RED BLOOD CELLS INTO PERIPHERAL VEIN, PERCUTANEOUS APPROACH: ICD-10-PCS | Performed by: SURGERY

## 2018-09-14 PROCEDURE — 80048 BASIC METABOLIC PNL TOTAL CA: CPT | Performed by: INTERNAL MEDICINE

## 2018-09-14 RX ORDER — INSULIN GLARGINE 100 [IU]/ML
14 INJECTION, SOLUTION SUBCUTANEOUS 2 TIMES DAILY
Status: DISCONTINUED | OUTPATIENT
Start: 2018-09-14 | End: 2018-09-17

## 2018-09-14 RX ORDER — FUROSEMIDE 10 MG/ML
20 INJECTION INTRAMUSCULAR; INTRAVENOUS ONCE
Status: COMPLETED | OUTPATIENT
Start: 2018-09-14 | End: 2018-09-14

## 2018-09-14 RX ORDER — FENTANYL 12 UG/H
12 PATCH TRANSDERMAL
Status: DISCONTINUED | OUTPATIENT
Start: 2018-09-14 | End: 2018-09-17

## 2018-09-14 RX ADMIN — ACETAMINOPHEN 975 MG: 325 TABLET ORAL at 13:19

## 2018-09-14 RX ADMIN — AMIODARONE HYDROCHLORIDE 200 MG: 200 TABLET ORAL at 08:40

## 2018-09-14 RX ADMIN — AMLODIPINE BESYLATE 10 MG: 10 TABLET ORAL at 08:40

## 2018-09-14 RX ADMIN — MAGNESIUM OXIDE TAB 400 MG (241.3 MG ELEMENTAL MG) 400 MG: 400 (241.3 MG) TAB at 17:00

## 2018-09-14 RX ADMIN — METRONIDAZOLE 500 MG: 500 TABLET ORAL at 08:40

## 2018-09-14 RX ADMIN — VITAMIN D, TAB 1000IU (100/BT) 1000 UNITS: 25 TAB at 08:39

## 2018-09-14 RX ADMIN — HYDROMORPHONE HYDROCHLORIDE 0.5 MG: 1 INJECTION, SOLUTION INTRAMUSCULAR; INTRAVENOUS; SUBCUTANEOUS at 04:04

## 2018-09-14 RX ADMIN — CEFEPIME HYDROCHLORIDE 2000 MG: 2 INJECTION, POWDER, FOR SOLUTION INTRAVENOUS at 03:40

## 2018-09-14 RX ADMIN — HEPARIN SODIUM AND DEXTROSE 16 UNITS/KG/HR: 10000; 5 INJECTION INTRAVENOUS at 21:18

## 2018-09-14 RX ADMIN — GABAPENTIN 200 MG: 100 CAPSULE ORAL at 08:40

## 2018-09-14 RX ADMIN — AMIODARONE HYDROCHLORIDE 200 MG: 200 TABLET ORAL at 17:00

## 2018-09-14 RX ADMIN — ACETAMINOPHEN 975 MG: 325 TABLET ORAL at 05:01

## 2018-09-14 RX ADMIN — CEFEPIME HYDROCHLORIDE 2000 MG: 2 INJECTION, POWDER, FOR SOLUTION INTRAVENOUS at 16:56

## 2018-09-14 RX ADMIN — LEVETIRACETAM 750 MG: 750 TABLET, FILM COATED ORAL at 08:40

## 2018-09-14 RX ADMIN — INSULIN GLARGINE 14 UNITS: 100 INJECTION, SOLUTION SUBCUTANEOUS at 09:47

## 2018-09-14 RX ADMIN — FUROSEMIDE 40 MG: 40 TABLET ORAL at 08:40

## 2018-09-14 RX ADMIN — INSULIN GLARGINE 14 UNITS: 100 INJECTION, SOLUTION SUBCUTANEOUS at 17:25

## 2018-09-14 RX ADMIN — INSULIN LISPRO 1 UNITS: 100 INJECTION, SOLUTION INTRAVENOUS; SUBCUTANEOUS at 16:52

## 2018-09-14 RX ADMIN — INSULIN LISPRO 1 UNITS: 100 INJECTION, SOLUTION INTRAVENOUS; SUBCUTANEOUS at 12:47

## 2018-09-14 RX ADMIN — ATORVASTATIN CALCIUM 80 MG: 80 TABLET, FILM COATED ORAL at 16:57

## 2018-09-14 RX ADMIN — OXYCODONE HYDROCHLORIDE 5 MG: 5 TABLET ORAL at 04:57

## 2018-09-14 RX ADMIN — POLYETHYLENE GLYCOL 3350 17 G: 17 POWDER, FOR SOLUTION ORAL at 09:47

## 2018-09-14 RX ADMIN — DICLOFENAC 2 G: 10 GEL TOPICAL at 17:00

## 2018-09-14 RX ADMIN — HEPARIN SODIUM 2000 UNITS: 1000 INJECTION, SOLUTION INTRAVENOUS; SUBCUTANEOUS at 03:32

## 2018-09-14 RX ADMIN — FENTANYL 12 MCG: 12.5 PATCH TRANSDERMAL at 14:36

## 2018-09-14 RX ADMIN — METRONIDAZOLE 500 MG: 500 TABLET ORAL at 03:32

## 2018-09-14 RX ADMIN — DICLOFENAC 2 G: 10 GEL TOPICAL at 08:47

## 2018-09-14 RX ADMIN — FLUTICASONE PROPIONATE 1 PUFF: 110 AEROSOL, METERED RESPIRATORY (INHALATION) at 21:19

## 2018-09-14 RX ADMIN — DICLOFENAC 2 G: 10 GEL TOPICAL at 12:25

## 2018-09-14 RX ADMIN — LEVOTHYROXINE SODIUM 100 MCG: 100 TABLET ORAL at 05:01

## 2018-09-14 RX ADMIN — GABAPENTIN 200 MG: 100 CAPSULE ORAL at 17:00

## 2018-09-14 RX ADMIN — METRONIDAZOLE 500 MG: 500 TABLET ORAL at 16:57

## 2018-09-14 RX ADMIN — METHOCARBAMOL 250 MG: 500 TABLET ORAL at 05:47

## 2018-09-14 RX ADMIN — MAGNESIUM OXIDE TAB 400 MG (241.3 MG ELEMENTAL MG) 400 MG: 400 (241.3 MG) TAB at 08:39

## 2018-09-14 RX ADMIN — FUROSEMIDE 20 MG: 10 INJECTION, SOLUTION INTRAMUSCULAR; INTRAVENOUS at 14:37

## 2018-09-14 RX ADMIN — ACETAMINOPHEN 975 MG: 325 TABLET ORAL at 21:18

## 2018-09-14 RX ADMIN — FLUTICASONE PROPIONATE 1 PUFF: 110 AEROSOL, METERED RESPIRATORY (INHALATION) at 08:47

## 2018-09-14 RX ADMIN — DICLOFENAC 2 G: 10 GEL TOPICAL at 21:19

## 2018-09-14 RX ADMIN — Medication 1 TABLET: at 21:18

## 2018-09-14 NOTE — MEDICAL STUDENT
MEDICAL STUDENT  Inpatient Progress Note for TRAINING ONLY  Not Part of Legal Medical Record       Progress Note - Nickolas Flowers [de-identified] y o  female MRN: [id number]    Unit/Bed#: [facility] 527-01 Encounter: [id number]      Assessment:  [de-identified] y/o F p/w L 1st toe dry gangrene 2/2 to PVD POD2 s/p surgical exploration of L plantar artery, no suitable distal target found  Patient is in NAD and has improved pain  Plan:  Dry gangrene of L 1st toe 2/2 to peripheral vascular disease  - Patient is s/p exploration of the L plantar artery  Per vascular surgery, no suitable distal target was found  - Vascular recommends L BKA  Patient is not completely refusing L BKA, would like to speak to her children  Anemia  - Her Hb has been stable around 7 3 - 7 9, but is noted to be 6 5 this morning   - Considering her recent surgery, transfuse 1 unit of blood  History of DVT/PE  - Continue heparin  CAD s/p CABG  - Continue atorvastatin/aspirin  Paroxysmal Afib  - Continue amiodarone and heparin  HTN and DM type 2  - Continue amlodipine, IV labetalol; lisinopril has been held due to LEONARD  - Continue Lantus and insulin as needed        Subjective:   Mrs Cheli Ely is an [de-identified] y F who presented to the hospital with dry gangrene of the 1st L toe  She has an extensive PMHx relevant for PAD, HTN, CAD s/p CABG, type 2 DM, MI, chronic foot ulcers, asthma, CVA, pHTN  She presented to the ED on 9/7 for worsening bilateral foot pain and discoloration on the L foot  She was found to have dry gangrene of the 1st L toe, L 2nd toe eschar, and R 3rd toe dry gangrene  She initially denied BKA and sought a second opinion, where she was scheduled to have surgery on 9/12 for L leg bypass  Today, she is POD# 2 s/p surgical exploration of the L plantar artery w/o suitable distal target  bShe has had no acute events overnight and reports that the pain in her L foot is a 8/10, improved since yesterday  She is less confused and more cooperative than   She denies SOB, chest pain, N/V, and F/Cs    She is awaiting to speak to her children before considering a L BKA  Objective:     Vitals: Blood pressure 127/58, pulse 58, temperature 98 3 °F (36 8 °C), temperature source Oral, resp  rate 18, height 5' 4" (1 626 m), weight 71 7 kg (158 lb 1 1 oz), SpO2 91 %, not currently breastfeeding  ,Body mass index is 27 13 kg/m²  Intake/Output Summary (Last 24 hours) at 09/14/18 0846  Last data filed at 09/14/18 0843   Gross per 24 hour   Intake            805 2 ml   Output             1875 ml   Net          -1069 8 ml       Physical Exam: General appearance: alert and oriented, in no acute distress  Lungs: clear to auscultation bilaterally  Heart: regular rate and rhythm, S1, S2 normal, no murmur, click, rub or gallop  Abdomen: soft, non-tender; bowel sounds normal; no masses,  no organomegaly  Extremities: dry gangrene bilaterally  Skin: discoloration of lower extremities  Neurologic: Grossly normal, bilaterally decreased sensation on feet and legs      Invasive Devices     Central Venous Catheter Line            CVC Central Lines 09/12/18 Triple 1 day          Peripheral Intravenous Line            Peripheral IV 09/11/18 Left;Ventral (anterior) Arm 2 days    Peripheral IV 09/12/18 Right Forearm 2 days          Arterial Line            Arterial Line 09/12/18 Right Axillary 1 day          Drain            Urethral Catheter Non-latex;Straight-tip less than 1 day                Lab, Imaging and other studies:  Recent Results (from the past 24 hour(s))   Fingerstick Glucose (POCT)    Collection Time: 09/13/18 12:06 PM   Result Value Ref Range    POC Glucose 124 65 - 140 mg/dl   Fingerstick Glucose (POCT)    Collection Time: 09/13/18  2:16 PM   Result Value Ref Range    POC Glucose 130 65 - 140 mg/dl   Fingerstick Glucose (POCT)    Collection Time: 09/13/18  4:53 PM   Result Value Ref Range    POC Glucose 88 65 - 140 mg/dl   Fingerstick Glucose (POCT)    Collection Time: 09/13/18  8:57 PM Result Value Ref Range    POC Glucose 96 65 - 140 mg/dl   APTT    Collection Time: 09/14/18 12:54 AM   Result Value Ref Range    PTT 53 (H) 24 - 36 seconds   CBC and differential    Collection Time: 09/14/18  4:51 AM   Result Value Ref Range    WBC 12 15 (H) 4 31 - 10 16 Thousand/uL    RBC 2 71 (L) 3 81 - 5 12 Million/uL    Hemoglobin 6 5 (LL) 11 5 - 15 4 g/dL    Hematocrit 22 7 (L) 34 8 - 46 1 %    MCV 84 82 - 98 fL    MCH 24 0 (L) 26 8 - 34 3 pg    MCHC 28 6 (L) 31 4 - 37 4 g/dL    RDW 20 0 (H) 11 6 - 15 1 %    MPV 10 4 8 9 - 12 7 fL    Platelets 043 684 - 068 Thousands/uL    nRBC 0 /100 WBCs    Neutrophils Relative 78 (H) 43 - 75 %    Immat GRANS % 1 0 - 2 %    Lymphocytes Relative 9 (L) 14 - 44 %    Monocytes Relative 10 4 - 12 %    Eosinophils Relative 2 0 - 6 %    Basophils Relative 0 0 - 1 %    Neutrophils Absolute 9 56 (H) 1 85 - 7 62 Thousands/µL    Immature Grans Absolute 0 09 0 00 - 0 20 Thousand/uL    Lymphocytes Absolute 1 07 0 60 - 4 47 Thousands/µL    Monocytes Absolute 1 18 0 17 - 1 22 Thousand/µL    Eosinophils Absolute 0 22 0 00 - 0 61 Thousand/µL    Basophils Absolute 0 03 0 00 - 0 10 Thousands/µL   Basic metabolic panel    Collection Time: 09/14/18  4:51 AM   Result Value Ref Range    Sodium 138 136 - 145 mmol/L    Potassium 3 6 3 5 - 5 3 mmol/L    Chloride 105 100 - 108 mmol/L    CO2 26 21 - 32 mmol/L    ANION GAP 7 4 - 13 mmol/L    BUN 10 5 - 25 mg/dL    Creatinine 0 70 0 60 - 1 30 mg/dL    Glucose 84 65 - 140 mg/dL    Calcium 8 1 (L) 8 3 - 10 1 mg/dL    eGFR 82 ml/min/1 73sq m   Fingerstick Glucose (POCT)    Collection Time: 09/14/18  6:23 AM   Result Value Ref Range    POC Glucose 83 65 - 140 mg/dl   Prepare RBC:Special Requirements: None; Has consent been obtained? Yes; Date of Surgery: 9/12/2018; Where is the Surgery Scheduled?  [address] [facility] [place], 2 Units    Collection Time: 09/14/18  6:50 AM   Result Value Ref Range    Unit Product Code K8662S20     Unit Number [id number]     Unit ABO O     Unit RH POS     Unit Dispense Status Return to [facility]     Unit Product Code X5923M99     Unit Number [id number]     Unit ABO O     Unit RH POS     Unit Dispense Status Return to Inv    Prepare RBC:Special Requirements: Leukoreduced; Has consent been obtained? Yes, 2 Units    Collection Time: 09/14/18  8:44 AM   Result Value Ref Range    Unit Product Code S3523P26     Unit Number [id number]     Unit ABO O     Unit [facility] POS     Unit Dispense Status Crossmatched     Unit Product Code U6857X98     Unit Number [id number]     Unit ABO O     Unit [facility] POS     Unit Dispense Status Crossmatched      XR chest portable   Final Result by Rere Taylor DO (09/12 1253)   1  Diminished inspiration  Mild vascular congestion  2   Right internal jugular central line in satisfactory position  Workstation performed: [id number]         XR chest pa & lateral   Final Result by Мария Fuentes MD (09/08 0254)      Mild pulmonary vascular congestion and cardiomegaly raise concern for CHF  Workstation performed: [id number]         XR foot 3+ vw left   Final Result by Ryan Dunn MD (09/07 9445)      Diffuse swelling throughout the foot  No evidence of gas or collection  No radiographic evidence of osteomyelitis  Workstation performed: [id number]         XR shoulder 2+ views LEFT   ED Interpretation by Abimael Rousseau MD (09/07 0395)   The shoulder was ordered by resident and interpreted by me independently  On my read, it appears:   - arthritic changes   - no obvious fracture      Final Result by Carlita Sosa MD (09/07 1927)      Relatively prominent ossific density projecting between acromion and humeral head felt to most likely be a large bone spur  This might be impinging on rotator cuff    Suggest further workup with MRI            Workstation performed: [id number]           VTE Pharmacologic Prophylaxis: Heparin  VTE Mechanical Prophylaxis: reason for no mechanical VTE prophylaxis dry gangrene of feet bilaterally

## 2018-09-14 NOTE — PROGRESS NOTES
[facility] - Internal Medicine Progress Note      PATIENT INFORMATION      Patient: Tamy Baig [de-identified] y o  female   MRN: [id number]  PCP: Heath Arauz MD  Unit/Bed#: [facility] 527-01 Encounter: [id number]  Date Of Visit: 18  Current Length of Stay: 7 day(s)     ASSESSMENTS & PLAN        1   Severe peripheral vascular disease  Involving both feet but mainly left great toe   Dry gangrene with concern for wet gangrene of left great toe      · Plan per Podiatry/Vascular  · Anemia: Will transfuse 2units given CAD and recent procedure  Possible give a small dose of lasix on setting of CHF  · Wound culture of little utility, likely colonization  Previously evaluated by Infectious Disease  · Analgesia:  Plan per Acute Pain Service     2   History of DVT/PE  · Heparin gtt  · Hold SCDs in setting of chronic lower extremity wounds     3   Coronary artery disease status post CABG  · Continue atorvastatin/aspirin     4   Paroxysmal AFib  · Continue amiodarone 20 mg b i d  rate control  · Anticoagulation as above     5   Chronic systolic heart failure (without exacerbation)  · Cont Lasix         6   Elevated INR secondary to her Xarelto  · Anticoagulation as above     7   Hypertension  · Continue Amlodipine  · Lisinopril held  · IV labetalol 5 mg q 6 hours p r n      8   Acute kidney injury  · Resolved     9   Type 2 diabetes mellitus  · BG's normal low range   Decrease Lantus to 14 units BID and sliding scale insulin      VTE Pharmacologic Prophylaxis: Heparin   VTE Mechanical Prophylaxis: SCD's      SUBJECTIVE     Patient continues to complain of pain in left foot  Patient still think about decision for BKA  Patient denies chest pain, palpitations, SOB, cough, abdominal pain, nausea, vomiting, constipation, diarrhea, fevers/chills, headaches, dysuria          OBJECTIVE     Vitals:   Temp (24hrs), Av 6 °F (37 °C), Min:98 °F (36 7 °C), Max:99 °F (37 2 °C)    HR:  [60-69] 61  Resp:  [18-20] 18  BP: (134-141)/(49-65) 140/65  SpO2:  [92 %-97 %] 92 %  Body mass index is 27 13 kg/m²  Input and Output Summary (last 24 hours): Intake/Output Summary (Last 24 hours) at 09/14/18 0658  Last data filed at 09/14/18 0040   Gross per 24 hour   Intake            805 2 ml   Output             1175 ml   Net           -369 8 ml       Physical Exam:   GENERAL: NAD  HEENT:  NC/AT, PERRL, EOMI, MMM, no scleral icterus  CARDIAC:  RRR, +S1/S2, no S3/S4 heard, no m/g/r  PULMONARY:  CTA B/L, no wheezing/rales/rhonci, non-labored breathing  ABDOMEN:  Soft, NT/ND, +BS, no rebound/guarding/rigidity  Extremities:  0 Pulses in DP/PT  No edema, cyanosis, or clubbing          ADDITIONAL DATA     Labs & Recent Cultures:       Results from last 7 days  Lab Units 09/13/18  0634   WBC Thousand/uL 11 58*   HEMOGLOBIN g/dL 7 4*   HEMATOCRIT % 25 0*   PLATELETS Thousands/uL 278   NEUTROS PCT % 77*   LYMPHS PCT % 11*   MONOS PCT % 9   EOS PCT % 2       Results from last 7 days  Lab Units 09/14/18  0451  09/12/18  1003  09/08/18  0518   SODIUM mmol/L 138  < >  --   < > 136   POTASSIUM mmol/L 3 6  < >  --   < > 4 6   CHLORIDE mmol/L 105  < >  --   < > 105   CO2 mmol/L 26  < >  --   < > 24   BUN mg/dL 10  < >  --   < > 28*   CREATININE mg/dL 0 70  < >  --   < > 1 09   CALCIUM mg/dL 8 1*  < >  --   < > 8 7   ALK PHOS U/L  --   --   --   --  80   ALT U/L  --   --   --   --  17   AST U/L  --   --   --   --  26   GLUCOSE, ISTAT mg/dl  --   --  104  --   --    < > = values in this interval not displayed      Results from last 7 days  Lab Units 09/09/18  1157   INR  1 52*           Results from last 7 days  Lab Units 09/11/18  0845   GRAM STAIN RESULT  No polys seen  3+ Gram negative rods  1+ Gram positive cocci in pairs   WOUND CULTURE  4+ Growth of Pseudomonas aeruginosa*  4+ Growth of Klebsiella oxytoca*  4+ Growth of Staphylococcus aureus*         Last 24 Hours Medication List:     Current Facility-Administered Medications:  acetaminophen 975 mg Oral Q8H MAGDALENO Chappell    albuterol 1 puff Inhalation Q4H PRN Bradly Gray MD    amiodarone 200 mg Oral BID Bradly Gray MD    amLODIPine 10 mg Oral Daily Bradly Gray MD    atorvastatin 80 mg Oral Daily Bradly Gray MD    bisacodyl 5 mg Oral Daily PRN Davis Kinsey MD    bisacodyl 10 mg Rectal Daily PRN Soraya Long PA-C    cefepime 2,000 mg Intravenous Q12H Ilana Arauz MD Last Rate: 2,000 mg (09/14/18 0340)   cholecalciferol 1,000 Units Oral Daily Bradly Gray MD    diclofenac sodium 2 g Topical 4x Daily Davis Kinsey MD    fentaNYL 25 mcg Transdermal Q72H Davis Kinsey MD    fluticasone 1 puff Inhalation Q12H [address] Bradly Gray MD    furosemide 40 mg Oral Daily Davis Kinsey MD    gabapentin 200 mg Oral BID Nasreen Montenegro PA-C    heparin (porcine) 3-20 Units/kg/hr (Order-Specific) Intravenous Titrated Carlton Kinsey MD Last Rate: 16 Units/kg/hr (09/14/18 0200)   heparin (porcine) 2,000 Units Intravenous PRN Carlton Kinsey MD    heparin (porcine) 4,000 Units Intravenous PRN Carlton Kinsey MD    HYDROmorphone 0 5 mg Intravenous Q4H PRN Nasreen Montenegro PA-C    insulin glargine 18 Units Subcutaneous BID Bradly Gray MD    insulin lispro 1-5 Units Subcutaneous TID AC Ethan Crisp, DO    insulin lispro 1-5 Units Subcutaneous HS Ethan Crisp, DO    labetalol 5 mg Intravenous Q6H PRN Ethan Crisp, DO    levETIRAcetam 750 mg Oral Daily Bradly Gray MD    levothyroxine 100 mcg Oral Early Morning Bradly Gray MD    magnesium oxide 400 mg Oral BID Bradly Gray MD    methocarbamol 250 mg Oral Q6H PRN Nasreen Montenegro PA-C    metroNIDAZOLE 500 mg Oral [facility] Jhonnie Iblly, DPM    oxyCODONE 5 mg Oral Q4H PRN Davis Kinsey MD    polyethylene glycol 17 g Oral Daily Davis Kinsey MD    prochlorperazine 5 mg Oral Q6H PRN Cinthia Cleary MD    senna-docusate sodium 1 tablet Oral HS Davis Kinsey MD    sodium chloride 75 mL/hr Intravenous Continuous Soraya Long PA-C Last Rate: 75 mL/hr (09/12/18 0134)   sodium chloride 20 mL/hr Intravenous Continuous Samuel Schwarz CRNA Last Rate: 20 mL/hr (09/13/18 1060)   sodium phosphate-biphosphate 1 enema Rectal Once PRN García Cabrera PA-C           Time Spent for Care: 30 mins spent in total   More than 50% of total time spent on counseling and coordination of care as described above  Current Length of Stay: 7 day(s)      Code Status: Level 3 - DNAR and DNI          ** Please Note: This note is constructed using a voice recognition dictation system   **

## 2018-09-14 NOTE — ANESTHESIA PROCEDURE NOTES
Arterial Line Insertion  Date/Time: 9/12/2018 9:08 AM  Performed by: Gisell Gutierrez  Authorized by: Gisell Gutierrez   Consent: Verbal consent obtained  Written consent obtained  Risks and benefits: risks, benefits and alternatives were discussed  Consent given by: patient  Patient understanding: patient states understanding of the procedure being performed  Patient consent: the patient's understanding of the procedure matches consent given  Relevant documents: relevant documents present and verified  Patient identity confirmed: verbally with patient and arm band  Time out: Immediately prior to procedure a "time out" was called to verify the correct patient, procedure, equipment, support staff and site/side marked as required  Indications: hemodynamic monitoring  Orientation:  Right  Location: axillary artery (Attempts made on left and right radial arteries  Flash obtained on both sides, but wire would not thread   Right axillary line subsequently placed under ultrasound guidance in one attempt )  Anesthesia: local infiltration  Sedation:  Patient sedated: no    Procedure Details:  Needle gauge: 20  Seldinger technique: Seldinger technique used  Number of attempts: 1    Post-procedure:  Post-procedure: dressing applied  Waveform: good waveform  Post-procedure CNS: normal  Patient tolerance: Patient tolerated the procedure well with no immediate complications

## 2018-09-14 NOTE — PROGRESS NOTES
Rounded with Art Caputo PA-C made aware patient has two peripheral IV sites and unable to flush or obtain blood return from white port on PICC line, verbal order okay to remove line

## 2018-09-14 NOTE — ASSESSMENT & PLAN NOTE
Severe PAD with Left foot gangrene    ~S/P Left Plantar artery exploration (Vessel not suitable as target for bypass) 9/12     Plan:  --Pt will benefit from Left BKA for infection and pain control  --Pt and family discussions in progress for planning  --Local foot care per podiatry  --Pain mgt; APS following  --Continue IV Abx

## 2018-09-14 NOTE — PROGRESS NOTES
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 [facility])  Active Problems:    Essential hypertension    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 native heart without angina pectoris    Type 2 diabetes mellitus with complication, with long-term current use of insulin ([facility])    Hyperlipidemia    Gastroesophageal reflux disease without esophagitis    Ulcer of toe of left foot ([facility])    Seizure ([facility])    Anemia    PAD (peripheral artery disease) ([facility])    Elevated INR    Hypothyroidism    Rotator cuff disorder, left    Wet gangrene ([facility])      Plan/Recommendations:   Acute on chronic pain  · Acetaminophen 975mg Q8 hours  · Gabapentin 200mg BID  · CrCl 49 65  · Decrease Fentanyl patch to 12mcg Q3 days today  · Patch must be removed prior to hyperbaric treatment   · Dilaudid 0 5mg IV Q4 hours PRN breakthrough pain  · Oxycodone 5mg Q4 hours PRN severe pain  · Robaxin 250mg Q6 hours PRN LE spasms     Plan for OR next week for amputation  Will see again on Monday      Reviewed with Vascular Team    Pain History  Current pain location(s): left foot  Pain Scale:  0-10  Severity:  Severe at times  24 hour history: Patient resting in bed, left foot pain is present, unchanged from yesterday  Left shoulder pain improved with Voltaren gel  Tolerating pain medications, PRN Dilaudid and oxycodone providing relief       Current Analgesic regimen:  Fentanyl 25mcg patch, Gabapentin 200mg BID, Tylenol 975mg Q8 hours, Voltaren gel four times a day for left shoulder, Dilaudid 0 5mg IV Q4 hours PRN (2 doses), Oxycodone 5mg Q4 hours PRN (2 doses), robaxin 250mg Q6 hours PRN (2 doses)   Bowel Regimen: Miralax daily, Senna-S at HS, Dulcolax PRN, fleet PRN       Meds/Allergies   all current active meds have been reviewed and current meds:   Current Facility-Administered Medications   Medication Dose Route Frequency    acetaminophen (TYLENOL) tablet 975 mg  975 mg Oral Q8H [facility]    albuterol (PROVENTIL HFA,VENTOLIN HFA) inhaler 1 puff  1 puff Inhalation Q4H PRN    amiodarone tablet 200 mg  200 mg Oral BID    amLODIPine (NORVASC) tablet 10 mg  10 mg Oral Daily    atorvastatin (LIPITOR) tablet 80 mg  80 mg Oral Daily    bisacodyl (DULCOLAX) EC tablet 5 mg  5 mg Oral Daily PRN    bisacodyl (DULCOLAX) rectal suppository 10 mg  10 mg Rectal Daily PRN    cefepime (MAXIPIME) 2,000 mg in dextrose 5 % 50 mL IVPB  2,000 mg Intravenous Q12H    cholecalciferol (VITAMIN D3) tablet 1,000 Units  1,000 Units Oral Daily    diclofenac sodium (VOLTAREN) 1 % topical gel 2 g  2 g Topical 4x Daily    fentaNYL (DURAGESIC) 12 mcg/hr TD 72 hr patch 12 mcg  12 mcg Transdermal Q72H    fluticasone (FLOVENT HFA) 110 MCG/ACT inhaler 1 puff  1 puff Inhalation Q12H LANCE    furosemide (LASIX) injection 20 mg  20 mg Intravenous Once    furosemide (LASIX) tablet 40 mg  40 mg Oral Daily    gabapentin (NEURONTIN) capsule 200 mg  200 mg Oral BID    heparin (porcine) 25,000 units in 250 mL infusion (premix)  3-20 Units/kg/hr (Order-Specific) Intravenous Titrated    heparin (porcine) injection 2,000 Units  2,000 Units Intravenous PRN    heparin (porcine) injection 4,000 Units  4,000 Units Intravenous PRN    HYDROmorphone (DILAUDID) injection 0 5 mg  0 5 mg Intravenous Q4H PRN    insulin glargine (LANTUS) subcutaneous injection 14 Units 0 14 mL  14 Units Subcutaneous BID    insulin lispro (HumaLOG) 100 units/mL subcutaneous injection 1-5 Units  1-5 Units Subcutaneous TID AC    insulin lispro (HumaLOG) 100 units/mL subcutaneous injection 1-5 Units  1-5 Units Subcutaneous HS    labetalol (NORMODYNE) injection 5 mg  5 mg Intravenous Q6H PRN    levETIRAcetam (KEPPRA) tablet 750 mg  750 mg Oral Daily    levothyroxine tablet 100 mcg  100 mcg Oral Early Morning    magnesium oxide (MAG-OX) tablet 400 mg  400 mg Oral BID    methocarbamol (ROBAXIN) tablet 250 mg  250 mg Oral Q6H PRN    metroNIDAZOLE (FLAGYL) tablet 500 mg  500 mg Oral Q8H [facility]    oxyCODONE (ROXICODONE) IR tablet 5 mg  5 mg Oral Q4H PRN    polyethylene glycol (MIRALAX) packet 17 g  17 g Oral Daily    prochlorperazine (COMPAZINE) tablet 5 mg  5 mg Oral Q6H PRN    senna-docusate sodium (SENOKOT S) 8 6-50 mg per tablet 1 tablet  1 tablet Oral HS    sodium chloride 0 9 % infusion  75 mL/hr Intravenous Continuous    sodium phosphate-biphosphate (FLEET) enema 1 enema  1 enema Rectal Once PRN       Allergies   Allergen Reactions    Other Chest Pain     IVP-listed as "chest pain" in previous chart, patient stated this occurred "a long time ago" but does not remember exactly occurred     Contrast [Iodinated Diagnostic Agents] Other (See Comments)     Flash pulm edema    Doxycycline Chest Pain    Ketorolac Other (See Comments)     Chest pain     Levaquin [Levofloxacin] Chest Pain    Ondansetron Chest Pain     Prolonged QT    Toradol [Ketorolac Tromethamine] Chest Pain       Objective     Temp:  [97 9 °F (36 6 °C)-99 °F (37 2 °C)] 98 5 °F (36 9 °C)  HR:  [58-69] 58  Resp:  [18] 18  BP: (127-141)/(49-65) 134/60      Intake/Output Summary (Last 24 hours) at 09/14/18 1039  Last data filed at 09/14/18 0846   Gross per 24 hour   Intake            449 4 ml   Output             1875 ml   Net          -1425 6 ml               Physical Exam: /60   Pulse 58   Temp 98 5 °F (36 9 °C) (Oral)   Resp 18   Ht 5' 4" (1 626 m)   Wt 71 7 kg (158 lb 1 1 oz)   LMP  (LMP Unknown)   SpO2 98%   BMI 27 13 kg/m²   General Appearance:    Awake and Alert, NAD   Neurological:   Oriented, forgetful at times   Head:    Normocephalic, without obvious abnormality, atraumatic   Eyes:    EOM intact    Lungs:     Respirations unlabored   Chest Wall:    No deformity   Abdomen:        Round   Extremities:   B/L LE mild edema present, + reis Skin:   Skin color pale     Lab Results:     Results from last 7 days  Lab Units 09/14/18  0451   WBC Thousand/uL 12 15*   HEMOGLOBIN g/dL 6 5*   HEMATOCRIT % 22 7*   PLATELETS Thousands/uL 249      Results from last 7 days  Lab Units 09/14/18  0451  09/12/18  1003  09/08/18  0518   SODIUM mmol/L 138  < >  --   < > 136   POTASSIUM mmol/L 3 6  < >  --   < > 4 6   CHLORIDE mmol/L 105  < >  --   < > 105   CO2 mmol/L 26  < >  --   < > 24   BUN mg/dL 10  < >  --   < > 28*   CREATININE mg/dL 0 70  < >  --   < > 1 09   CALCIUM mg/dL 8 1*  < >  --   < > 8 7   ALK PHOS U/L  --   --   --   --  80   ALT U/L  --   --   --   --  17   AST U/L  --   --   --   --  26   GLUCOSE, ISTAT mg/dl  --   --  104  --   --    < > = values in this interval not displayed  Counseling / Coordination of Care  Total floor / unit time spent today 15 minutes  Greater than 50% of total time was spent with the patient and / or family counseling and / or coordination of care   A description of the counseling / coordination of care: Reviewed plan of care and medications with patient, RN staff and primary care team     Sil Arzate MS, RN-BC  Acute Pain

## 2018-09-14 NOTE — PROGRESS NOTES
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 [facility])   Assessment & Plan    Severe PAD with Left foot gangrene    ~S/P Left Plantar artery exploration (Vessel not suitable as target for bypass) 9/12     Plan:  --Pt will benefit from Left BKA for infection and pain control  --Pt and family discussions in progress for planning  --Local foot care per podiatry  --Pain mgt; APS following  --Continue IV Abx   --Continue Heparin gtt          Subjective:  Pt resting comfortably at time of eval    Vitals:  /65 (BP Location: Left arm)   Pulse 61   Temp 98 7 °F (37 1 °C) (Oral)   Resp 18   Ht 5' 4" (1 626 m)   Wt 71 7 kg (158 lb 1 1 oz)   LMP  (LMP Unknown)   SpO2 92%   BMI 27 13 kg/m²     I/Os:  I/O last 3 completed shifts: In: 1278 7 [P O :120; I V :1108 7; IV Piggyback:50]  Out: 1860 [Urine:1860]  I/O this shift:  In: -   Out: 500 [Urine:500]    Lab Results and Cultures:   Lab Results   Component Value Date    WBC 11 58 (H) 09/13/2018    HGB 7 4 (L) 09/13/2018    HCT 25 0 (L) 09/13/2018    MCV 83 09/13/2018     09/13/2018     Lab Results   Component Value Date    GLUCOSE 104 09/12/2018    CALCIUM 8 1 (L) 09/14/2018     09/14/2018    K 3 6 09/14/2018    CO2 26 09/14/2018     09/14/2018    BUN 10 09/14/2018    CREATININE 0 70 09/14/2018     Lab Results   Component Value Date    INR 1 52 (H) 09/09/2018    INR 1 77 (H) 09/08/2018    INR 2 26 (H) 09/07/2018    PROTIME 18 4 (H) 09/09/2018    PROTIME 20 7 (H) 09/08/2018    PROTIME 25 0 (H) 09/07/2018        Blood Culture:   Lab Results   Component Value Date    BLOODCX No Growth After 5 Days  08/08/2018    BLOODCX No Growth After 5 Days   08/08/2018   ,   Urinalysis:   Lab Results   Component Value Date    COLORU Yellow 05/18/2018    COLORU Yellow 12/21/2016    CLARITYU Clear 05/18/2018    CLARITYU Transparent 12/21/2016    SPECGRAV 1 010 05/18/2018    SPECGRAV 1 010 12/21/2016    PHUR 5 0 05/18/2018    PHUR 8 12/21/2016    LEUKOCYTESUR Small (A) 05/18/2018    LEUKOCYTESUR - 12/21/2016    NITRITE neg 06/12/2018    NITRITE Negative 05/18/2018    NITRITE - 12/21/2016    PROTEINUA Negative 05/18/2018    PROTEINUA - 12/21/2016    GLUCOSEU neg 06/12/2018    GLUCOSEU Negative 05/18/2018    GLUCOSEU Negative 05/28/2015    KETONESU neg 06/12/2018    KETONESU Negative 05/18/2018    KETONESU - 12/21/2016    BILIRUBINUR Negative 05/18/2018    BILIRUBINUR Negative 05/28/2015    BLOODU Negative 05/18/2018    BLOODU - 12/21/2016   ,   Urine Culture:   Lab Results   Component Value Date    URINECX >100,000 cfu/ml Proteus mirabilis (A) 06/12/2018   ,   Wound Culure:   Lab Results   Component Value Date    WOUNDCULT 4+ Growth of Pseudomonas aeruginosa (A) 09/11/2018    WOUNDCULT 4+ Growth of Klebsiella oxytoca (A) 09/11/2018    WOUNDCULT 4+ Growth of Staphylococcus aureus (A) 09/11/2018       Medications:  Current Facility-Administered Medications   Medication Dose Route Frequency    acetaminophen (TYLENOL) tablet 975 mg  975 mg Oral Q8H [facility]    albuterol (PROVENTIL HFA,VENTOLIN HFA) inhaler 1 puff  1 puff Inhalation Q4H PRN    amiodarone tablet 200 mg  200 mg Oral BID    amLODIPine (NORVASC) tablet 10 mg  10 mg Oral Daily    atorvastatin (LIPITOR) tablet 80 mg  80 mg Oral Daily    bisacodyl (DULCOLAX) EC tablet 5 mg  5 mg Oral Daily PRN    bisacodyl (DULCOLAX) rectal suppository 10 mg  10 mg Rectal Daily PRN    cefepime (MAXIPIME) 2,000 mg in dextrose 5 % 50 mL IVPB  2,000 mg Intravenous Q12H    cholecalciferol (VITAMIN D3) tablet 1,000 Units  1,000 Units Oral Daily    diclofenac sodium (VOLTAREN) 1 % topical gel 2 g  2 g Topical 4x Daily    fentaNYL (DURAGESIC) 25 mcg/hr TD 72 hr patch 25 mcg  25 mcg Transdermal Q72H    fluticasone (FLOVENT HFA) 110 MCG/ACT inhaler 1 puff  1 puff Inhalation Q12H LANCE    furosemide (LASIX) tablet 40 mg  40 mg Oral Daily    gabapentin (NEURONTIN) capsule 200 mg  200 mg Oral BID    heparin (porcine) 25,000 units in 250 mL infusion (premix)  3-20 Units/kg/hr (Order-Specific) Intravenous Titrated    heparin (porcine) injection 2,000 Units  2,000 Units Intravenous PRN    heparin (porcine) injection 4,000 Units  4,000 Units Intravenous PRN    HYDROmorphone (DILAUDID) injection 0 5 mg  0 5 mg Intravenous Q4H PRN    insulin glargine (LANTUS) subcutaneous injection 18 Units 0 18 mL  18 Units Subcutaneous BID    insulin lispro (HumaLOG) 100 units/mL subcutaneous injection 1-5 Units  1-5 Units Subcutaneous TID AC    insulin lispro (HumaLOG) 100 units/mL subcutaneous injection 1-5 Units  1-5 Units Subcutaneous HS    labetalol (NORMODYNE) injection 5 mg  5 mg Intravenous Q6H PRN    levETIRAcetam (KEPPRA) tablet 750 mg  750 mg Oral Daily    levothyroxine tablet 100 mcg  100 mcg Oral Early Morning    magnesium oxide (MAG-OX) tablet 400 mg  400 mg Oral BID    methocarbamol (ROBAXIN) tablet 250 mg  250 mg Oral Q6H PRN    metroNIDAZOLE (FLAGYL) tablet 500 mg  500 mg Oral Q8H [address]    oxyCODONE (ROXICODONE) IR tablet 5 mg  5 mg Oral Q4H PRN    polyethylene glycol (MIRALAX) packet 17 g  17 g Oral Daily    prochlorperazine (COMPAZINE) tablet 5 mg  5 mg Oral Q6H PRN    senna-docusate sodium (SENOKOT S) 8 6-50 mg per tablet 1 tablet  1 tablet Oral HS    sodium chloride 0 9 % infusion  75 mL/hr Intravenous Continuous    sodium chloride 0 9 % infusion  20 mL/hr Intravenous Continuous    sodium phosphate-biphosphate (FLEET) enema 1 enema  1 enema Rectal Once PRN       Physical Exam:    General appearance: alert and oriented, in no acute distress  Lungs: clear to auscultation bilaterally  Heart: regular rate and rhythm, S1, S2 normal, no murmur, click, rub or gallop  Abdomen: soft, non-tender; bowel sounds normal; no masses,  no organomegaly  Extremities: Left foot with dry gangrene, cool, pale, M/S intact    Wound/Incision:  Left foot incision clean, dry, and intact    Pulse exam:  DP: Right: non-palpable Left: non-palpable  PT: Right: non-palpable Left: non-palpable      Giselle Romero PA-C  9/14/2018

## 2018-09-15 LAB
ABO GROUP BLD BPU: NORMAL
ABO GROUP BLD BPU: NORMAL
APTT PPP: 52 SECONDS (ref 24–36)
APTT PPP: 55 SECONDS (ref 24–36)
BASOPHILS # BLD AUTO: 0.05 THOUSANDS/ΜL (ref 0–0.1)
BASOPHILS NFR BLD AUTO: 0 % (ref 0–1)
BPU ID: NORMAL
BPU ID: NORMAL
EOSINOPHIL # BLD AUTO: 0.25 THOUSAND/ΜL (ref 0–0.61)
EOSINOPHIL NFR BLD AUTO: 2 % (ref 0–6)
ERYTHROCYTE [DISTWIDTH] IN BLOOD BY AUTOMATED COUNT: 18.8 % (ref 11.6–15.1)
GLUCOSE SERPL-MCNC: 115 MG/DL (ref 65–140)
GLUCOSE SERPL-MCNC: 123 MG/DL (ref 65–140)
GLUCOSE SERPL-MCNC: 149 MG/DL (ref 65–140)
GLUCOSE SERPL-MCNC: 196 MG/DL (ref 65–140)
HCT VFR BLD AUTO: 31.7 % (ref 34.8–46.1)
HGB BLD-MCNC: 9.8 G/DL (ref 11.5–15.4)
IMM GRANULOCYTES # BLD AUTO: 0.08 THOUSAND/UL (ref 0–0.2)
IMM GRANULOCYTES NFR BLD AUTO: 1 % (ref 0–2)
LYMPHOCYTES # BLD AUTO: 1.2 THOUSANDS/ΜL (ref 0.6–4.47)
LYMPHOCYTES NFR BLD AUTO: 10 % (ref 14–44)
MCH RBC QN AUTO: 25.8 PG (ref 26.8–34.3)
MCHC RBC AUTO-ENTMCNC: 30.9 G/DL (ref 31.4–37.4)
MCV RBC AUTO: 83 FL (ref 82–98)
MONOCYTES # BLD AUTO: 1.12 THOUSAND/ΜL (ref 0.17–1.22)
MONOCYTES NFR BLD AUTO: 9 % (ref 4–12)
NEUTROPHILS # BLD AUTO: 9.48 THOUSANDS/ΜL (ref 1.85–7.62)
NEUTS SEG NFR BLD AUTO: 78 % (ref 43–75)
NRBC BLD AUTO-RTO: 0 /100 WBCS
PLATELET # BLD AUTO: 265 THOUSANDS/UL (ref 149–390)
PMV BLD AUTO: 10.3 FL (ref 8.9–12.7)
RBC # BLD AUTO: 3.8 MILLION/UL (ref 3.81–5.12)
UNIT DISPENSE STATUS: NORMAL
UNIT DISPENSE STATUS: NORMAL
UNIT PRODUCT CODE: NORMAL
UNIT PRODUCT CODE: NORMAL
UNIT RH: NORMAL
UNIT RH: NORMAL
WBC # BLD AUTO: 12.18 THOUSAND/UL (ref 4.31–10.16)

## 2018-09-15 PROCEDURE — 82948 REAGENT STRIP/BLOOD GLUCOSE: CPT

## 2018-09-15 PROCEDURE — 99024 POSTOP FOLLOW-UP VISIT: CPT | Performed by: SURGERY

## 2018-09-15 PROCEDURE — 85730 THROMBOPLASTIN TIME PARTIAL: CPT | Performed by: SURGERY

## 2018-09-15 PROCEDURE — 85025 COMPLETE CBC W/AUTO DIFF WBC: CPT | Performed by: SURGERY

## 2018-09-15 PROCEDURE — 99233 SBSQ HOSP IP/OBS HIGH 50: CPT | Performed by: INTERNAL MEDICINE

## 2018-09-15 RX ADMIN — OXYCODONE HYDROCHLORIDE 5 MG: 5 TABLET ORAL at 07:54

## 2018-09-15 RX ADMIN — METRONIDAZOLE 500 MG: 500 TABLET ORAL at 00:01

## 2018-09-15 RX ADMIN — GABAPENTIN 200 MG: 100 CAPSULE ORAL at 17:12

## 2018-09-15 RX ADMIN — HEPARIN SODIUM AND DEXTROSE 20 UNITS/KG/HR: 10000; 5 INJECTION INTRAVENOUS at 20:12

## 2018-09-15 RX ADMIN — FLUTICASONE PROPIONATE 1 PUFF: 110 AEROSOL, METERED RESPIRATORY (INHALATION) at 20:12

## 2018-09-15 RX ADMIN — HEPARIN SODIUM 2000 UNITS: 1000 INJECTION, SOLUTION INTRAVENOUS; SUBCUTANEOUS at 09:57

## 2018-09-15 RX ADMIN — INSULIN GLARGINE 14 UNITS: 100 INJECTION, SOLUTION SUBCUTANEOUS at 17:15

## 2018-09-15 RX ADMIN — MAGNESIUM OXIDE TAB 400 MG (241.3 MG ELEMENTAL MG) 400 MG: 400 (241.3 MG) TAB at 17:12

## 2018-09-15 RX ADMIN — DICLOFENAC 2 G: 10 GEL TOPICAL at 22:37

## 2018-09-15 RX ADMIN — METRONIDAZOLE 500 MG: 500 TABLET ORAL at 15:19

## 2018-09-15 RX ADMIN — AMIODARONE HYDROCHLORIDE 200 MG: 200 TABLET ORAL at 17:12

## 2018-09-15 RX ADMIN — ACETAMINOPHEN 975 MG: 325 TABLET ORAL at 05:38

## 2018-09-15 RX ADMIN — FLUTICASONE PROPIONATE 1 PUFF: 110 AEROSOL, METERED RESPIRATORY (INHALATION) at 10:00

## 2018-09-15 RX ADMIN — LEVETIRACETAM 750 MG: 750 TABLET, FILM COATED ORAL at 09:55

## 2018-09-15 RX ADMIN — AMIODARONE HYDROCHLORIDE 200 MG: 200 TABLET ORAL at 09:55

## 2018-09-15 RX ADMIN — PROCHLORPERAZINE MALEATE 5 MG: 5 TABLET, FILM COATED ORAL at 11:31

## 2018-09-15 RX ADMIN — INSULIN LISPRO 1 UNITS: 100 INJECTION, SOLUTION INTRAVENOUS; SUBCUTANEOUS at 12:01

## 2018-09-15 RX ADMIN — AMLODIPINE BESYLATE 10 MG: 10 TABLET ORAL at 09:55

## 2018-09-15 RX ADMIN — OXYCODONE HYDROCHLORIDE 5 MG: 5 TABLET ORAL at 23:57

## 2018-09-15 RX ADMIN — ATORVASTATIN CALCIUM 80 MG: 80 TABLET, FILM COATED ORAL at 17:12

## 2018-09-15 RX ADMIN — LEVOTHYROXINE SODIUM 100 MCG: 100 TABLET ORAL at 05:38

## 2018-09-15 RX ADMIN — GABAPENTIN 200 MG: 100 CAPSULE ORAL at 09:55

## 2018-09-15 RX ADMIN — VITAMIN D, TAB 1000IU (100/BT) 1000 UNITS: 25 TAB at 09:55

## 2018-09-15 RX ADMIN — FUROSEMIDE 40 MG: 40 TABLET ORAL at 09:55

## 2018-09-15 RX ADMIN — METRONIDAZOLE 500 MG: 500 TABLET ORAL at 07:54

## 2018-09-15 RX ADMIN — POLYETHYLENE GLYCOL 3350 17 G: 17 POWDER, FOR SOLUTION ORAL at 09:55

## 2018-09-15 RX ADMIN — MAGNESIUM OXIDE TAB 400 MG (241.3 MG ELEMENTAL MG) 400 MG: 400 (241.3 MG) TAB at 09:55

## 2018-09-15 RX ADMIN — Medication 1 TABLET: at 22:36

## 2018-09-15 RX ADMIN — CEFEPIME HYDROCHLORIDE 2000 MG: 2 INJECTION, POWDER, FOR SOLUTION INTRAVENOUS at 04:30

## 2018-09-15 RX ADMIN — HEPARIN SODIUM 2000 UNITS: 1000 INJECTION, SOLUTION INTRAVENOUS; SUBCUTANEOUS at 17:24

## 2018-09-15 RX ADMIN — DICLOFENAC 2 G: 10 GEL TOPICAL at 10:00

## 2018-09-15 RX ADMIN — ACETAMINOPHEN 975 MG: 325 TABLET ORAL at 14:16

## 2018-09-15 RX ADMIN — OXYCODONE HYDROCHLORIDE 5 MG: 5 TABLET ORAL at 15:19

## 2018-09-15 RX ADMIN — DICLOFENAC 2 G: 10 GEL TOPICAL at 17:12

## 2018-09-15 RX ADMIN — INSULIN GLARGINE 14 UNITS: 100 INJECTION, SOLUTION SUBCUTANEOUS at 10:05

## 2018-09-15 RX ADMIN — ACETAMINOPHEN 975 MG: 325 TABLET ORAL at 22:36

## 2018-09-15 RX ADMIN — CEFEPIME HYDROCHLORIDE 2000 MG: 2 INJECTION, POWDER, FOR SOLUTION INTRAVENOUS at 17:12

## 2018-09-15 RX ADMIN — METRONIDAZOLE 500 MG: 500 TABLET ORAL at 22:38

## 2018-09-15 NOTE — PROGRESS NOTES
Progress Note - Vascular Surgery   Miko Keller [de-identified] y o  female MRN: [id number]  Unit/Bed#: [facility] 527-01 Encounter: [id number]    Assessment:  [de-identified] y/o F w/ PAD who p/w L 1st toe wet gangrene, L 2nd toe eschar, R 3rd toe dry gangrene, s/p L plantar artery exploration, POD #3    Plan:  --Planning for L BKA; pt still undecided  --Pain control  --Local wound care per Podiatry  --Continue IV Abx  --Continue Heparin gtt  --Cardiac/CCD 2 Diet    Subjective/Objective     Subjective:     No acute events overnight  Pt c/o chills, 10/10 b/l foot pain this AM      Objective:     Blood pressure 152/67, pulse 67, temperature 98 6 °F (37 °C), temperature source Oral, resp  rate 18, height 5' 4" (1 626 m), weight 71 7 kg (158 lb 1 1 oz), SpO2 91 %, not currently breastfeeding  ,Body mass index is 27 13 kg/m²  Intake/Output Summary (Last 24 hours) at 09/15/18 0558  Last data filed at 09/14/18 2305   Gross per 24 hour   Intake          1917 76 ml   Output             1700 ml   Net           217 76 ml       Invasive Devices     Peripheral Intravenous Line            Peripheral IV 09/11/18 Left;Ventral (anterior) Arm 3 days    Peripheral IV 09/12/18 Right Forearm 3 days          Drain            Urethral Catheter Non-latex;Straight-tip less than 1 day                Physical Exam:    GEN: NAD  HEENT: MMM  CV: RRR  Lung: normal effort  Ab: Soft, NT/ND  Extrem: No CCE; L 1st toe wet gangrene, L 2nd toe eschar, R 3rd toe dry gangrene, L foot dressing c/d/i  Neuro:  A+Ox3, motor and sensation grossly intact  Pulse exam: L doppl PT, R doppl DP/PT      Lab, Imaging and other studies:  CBC:   Lab Results   Component Value Date    HGB 10 2 (L) 09/14/2018    HCT 32 6 (L) 09/14/2018   , CMP: No results found for: NA, K, CL, CO2, ANIONGAP, BUN, CREATININE, GLUCOSE, CALCIUM, AST, ALT, ALKPHOS, PROT, ALBUMIN, BILITOT, EGFR, Coagulation: No results found for: PT, INR, APTT, Urinalysis: No results found for: JARED Akins PHUR, LEUKOCYTESUR, NITRITE, PROTEINUA, GLUCOSEU, KETONESU, BILIRUBINUR, BLOODU, Amylase: No results found for: AMYLASE, Lipase: No results found for: LIPASE  VTE Pharmacologic Prophylaxis: Heparin  VTE Mechanical Prophylaxis: sequential compression device

## 2018-09-15 NOTE — PROGRESS NOTES
Progress Note - Fredis Bravo [de-identified] y o  female MRN: [id number]    Unit/Bed#: [facility] 527-01 Encounter: [id number]      Assessment and Plan  1   Severe peripheral vascular disease  Patient is very upset that the her left leg need to be amputated above the knee, bypass grafting was tried and there was no vessels in the foot was available to bypass or accessible for bypass I reviewed the vascular surgery notes, this was told to her before with other vascular surgeon she got a 2nd opinion and plan was to try because patient was very much against the amputation  I will try to reach patient's son today her right toe gangrene is doing better status post angioplasty    2   History of DVT/PE  · Heparin gtt  · Hold SCDs in setting of chronic lower extremity wounds     3   Coronary artery disease status post CABG  · Continue atorvastatin/aspirin  · Patient is a history of coronary artery disease she is on medications she described no chest pain or shortness of breath right now     4   Paroxysmal AFib  · Continue amiodarone 20 mg b i d  rate control  · Anticoagulation as above continue with anticoagulation     5   Chronic systolic heart failure (without exacerbation)  · Cont Lasix  · Patient is a multiple episodes of congestive cardiac failure I can hear bilateral crackles but otherwise there is no other signs of congestive failure we have to observe the patient        6   Elevated INR secondary to her Xarelto  · Anticoagulation as above  ·      7   Hypertension  · Continue Amlodipine  · Lisinopril held  · IV labetalol 5 mg q 6 hours p r n      8   Acute kidney injury  · Resolved     9   Type 2 diabetes mellitus    · BG's normal low range   Decrease Lantus to 14 units BID and sliding scal     10    Anemia secondary to blood loss is stable hemoglobin is 9 8 and 31  Subjective:   Very upset very depress because the bypass was not able to be done hand she will need the amputation    Objective:     Vitals: Blood pressure 167/73, pulse 62, temperature 98 2 °F (36 8 °C), temperature source Oral, resp  rate 19, height 5' 4" (1 626 m), weight 71 7 kg (158 lb 1 1 oz), SpO2 92 %, not currently breastfeeding  ,Body mass index is 27 13 kg/m²  Intake/Output Summary (Last 24 hours) at 09/15/18 1120  Last data filed at 09/15/18 0946   Gross per 24 hour   Intake             1765 ml   Output             1750 ml   Net               15 ml       Physical Exam:  HEENT examination is unremarkable  Neck is supple no JVD no lymphadenopathy no thyromegaly  CVS is normal S1 and S2 with irregularly regular heart sound but can controlled ventricular response  Chest bilateral crackles at the base  Periphery there is no edema gangrene of the left big toe and adjacent toe was noted with surrounding erythema and the yellowish discoloration of the skin on the right side 1 toys affected with the gangrene of the tip  Patient is anxious depress frustrated  All labs of the patient reviewed  Invasive Devices     Peripheral Intravenous Line            Peripheral IV 09/11/18 Left;Ventral (anterior) Arm 3 days    Peripheral IV 09/12/18 Right Forearm 3 days    Peripheral IV 09/15/18 Left Hand less than 1 day          Drain            Urethral Catheter Non-latex;Straight-tip 1 day                Lab, Imaging and other studies: I have personally reviewed pertinent reports      Patient on Xarelto  VTE Pharmacologic Prophylaxis: Sequential compression device (Venodyne)   VTE Mechanical Prophylaxis: sequential compression device

## 2018-09-16 ENCOUNTER — ANESTHESIA EVENT (INPATIENT)
Dept: PERIOP | Facility: HOSPITAL | Age: 78
DRG: 239 | End: 2018-09-16
Payer: MEDICARE

## 2018-09-16 LAB
ANION GAP SERPL CALCULATED.3IONS-SCNC: 6 MMOL/L (ref 4–13)
APTT PPP: 105 SECONDS (ref 24–36)
APTT PPP: 87 SECONDS (ref 24–36)
APTT PPP: 90 SECONDS (ref 24–36)
BASOPHILS # BLD AUTO: 0.03 THOUSANDS/ΜL (ref 0–0.1)
BASOPHILS NFR BLD AUTO: 0 % (ref 0–1)
BUN SERPL-MCNC: 11 MG/DL (ref 5–25)
CALCIUM SERPL-MCNC: 8.3 MG/DL (ref 8.3–10.1)
CHLORIDE SERPL-SCNC: 105 MMOL/L (ref 100–108)
CO2 SERPL-SCNC: 27 MMOL/L (ref 21–32)
CREAT SERPL-MCNC: 0.65 MG/DL (ref 0.6–1.3)
EOSINOPHIL # BLD AUTO: 0.28 THOUSAND/ΜL (ref 0–0.61)
EOSINOPHIL NFR BLD AUTO: 2 % (ref 0–6)
ERYTHROCYTE [DISTWIDTH] IN BLOOD BY AUTOMATED COUNT: 19.5 % (ref 11.6–15.1)
GFR SERPL CREATININE-BSD FRML MDRD: 84 ML/MIN/1.73SQ M
GLUCOSE SERPL-MCNC: 103 MG/DL (ref 65–140)
GLUCOSE SERPL-MCNC: 105 MG/DL (ref 65–140)
GLUCOSE SERPL-MCNC: 145 MG/DL (ref 65–140)
GLUCOSE SERPL-MCNC: 152 MG/DL (ref 65–140)
GLUCOSE SERPL-MCNC: 203 MG/DL (ref 65–140)
HCT VFR BLD AUTO: 33.8 % (ref 34.8–46.1)
HGB BLD-MCNC: 10.7 G/DL (ref 11.5–15.4)
IMM GRANULOCYTES # BLD AUTO: 0.08 THOUSAND/UL (ref 0–0.2)
IMM GRANULOCYTES NFR BLD AUTO: 1 % (ref 0–2)
LYMPHOCYTES # BLD AUTO: 1.37 THOUSANDS/ΜL (ref 0.6–4.47)
LYMPHOCYTES NFR BLD AUTO: 12 % (ref 14–44)
MCH RBC QN AUTO: 26.2 PG (ref 26.8–34.3)
MCHC RBC AUTO-ENTMCNC: 31.7 G/DL (ref 31.4–37.4)
MCV RBC AUTO: 83 FL (ref 82–98)
MONOCYTES # BLD AUTO: 1.17 THOUSAND/ΜL (ref 0.17–1.22)
MONOCYTES NFR BLD AUTO: 10 % (ref 4–12)
NEUTROPHILS # BLD AUTO: 9.02 THOUSANDS/ΜL (ref 1.85–7.62)
NEUTS SEG NFR BLD AUTO: 75 % (ref 43–75)
NRBC BLD AUTO-RTO: 0 /100 WBCS
PLATELET # BLD AUTO: 270 THOUSANDS/UL (ref 149–390)
PMV BLD AUTO: 9.6 FL (ref 8.9–12.7)
POTASSIUM SERPL-SCNC: 3.6 MMOL/L (ref 3.5–5.3)
RBC # BLD AUTO: 4.08 MILLION/UL (ref 3.81–5.12)
SODIUM SERPL-SCNC: 138 MMOL/L (ref 136–145)
TSH SERPL DL<=0.05 MIU/L-ACNC: 2.97 UIU/ML (ref 0.36–3.74)
WBC # BLD AUTO: 11.95 THOUSAND/UL (ref 4.31–10.16)

## 2018-09-16 PROCEDURE — 80048 BASIC METABOLIC PNL TOTAL CA: CPT | Performed by: SURGERY

## 2018-09-16 PROCEDURE — 99024 POSTOP FOLLOW-UP VISIT: CPT | Performed by: SURGERY

## 2018-09-16 PROCEDURE — 85730 THROMBOPLASTIN TIME PARTIAL: CPT | Performed by: SURGERY

## 2018-09-16 PROCEDURE — 99233 SBSQ HOSP IP/OBS HIGH 50: CPT | Performed by: INTERNAL MEDICINE

## 2018-09-16 PROCEDURE — 85025 COMPLETE CBC W/AUTO DIFF WBC: CPT | Performed by: SURGERY

## 2018-09-16 PROCEDURE — 84443 ASSAY THYROID STIM HORMONE: CPT | Performed by: INTERNAL MEDICINE

## 2018-09-16 PROCEDURE — 82948 REAGENT STRIP/BLOOD GLUCOSE: CPT

## 2018-09-16 RX ORDER — POTASSIUM CHLORIDE 20 MEQ/1
40 TABLET, EXTENDED RELEASE ORAL ONCE
Status: COMPLETED | OUTPATIENT
Start: 2018-09-16 | End: 2018-09-16

## 2018-09-16 RX ADMIN — PROCHLORPERAZINE MALEATE 5 MG: 5 TABLET, FILM COATED ORAL at 12:08

## 2018-09-16 RX ADMIN — DICLOFENAC 2 G: 10 GEL TOPICAL at 09:03

## 2018-09-16 RX ADMIN — MAGNESIUM OXIDE TAB 400 MG (241.3 MG ELEMENTAL MG) 400 MG: 400 (241.3 MG) TAB at 09:02

## 2018-09-16 RX ADMIN — GABAPENTIN 200 MG: 100 CAPSULE ORAL at 09:01

## 2018-09-16 RX ADMIN — INSULIN GLARGINE 14 UNITS: 100 INJECTION, SOLUTION SUBCUTANEOUS at 09:02

## 2018-09-16 RX ADMIN — DICLOFENAC 2 G: 10 GEL TOPICAL at 17:29

## 2018-09-16 RX ADMIN — AMLODIPINE BESYLATE 10 MG: 10 TABLET ORAL at 09:02

## 2018-09-16 RX ADMIN — INSULIN GLARGINE 14 UNITS: 100 INJECTION, SOLUTION SUBCUTANEOUS at 17:29

## 2018-09-16 RX ADMIN — MAGNESIUM OXIDE TAB 400 MG (241.3 MG ELEMENTAL MG) 400 MG: 400 (241.3 MG) TAB at 17:28

## 2018-09-16 RX ADMIN — GABAPENTIN 200 MG: 100 CAPSULE ORAL at 17:28

## 2018-09-16 RX ADMIN — LEVOTHYROXINE SODIUM 100 MCG: 100 TABLET ORAL at 06:12

## 2018-09-16 RX ADMIN — AMIODARONE HYDROCHLORIDE 200 MG: 200 TABLET ORAL at 17:28

## 2018-09-16 RX ADMIN — ATORVASTATIN CALCIUM 80 MG: 80 TABLET, FILM COATED ORAL at 17:28

## 2018-09-16 RX ADMIN — FLUTICASONE PROPIONATE 1 PUFF: 110 AEROSOL, METERED RESPIRATORY (INHALATION) at 09:03

## 2018-09-16 RX ADMIN — OXYCODONE HYDROCHLORIDE 5 MG: 5 TABLET ORAL at 11:06

## 2018-09-16 RX ADMIN — DICLOFENAC 2 G: 10 GEL TOPICAL at 11:09

## 2018-09-16 RX ADMIN — HEPARIN SODIUM AND DEXTROSE 18 UNITS/KG/HR: 10000; 5 INJECTION INTRAVENOUS at 17:43

## 2018-09-16 RX ADMIN — METRONIDAZOLE 500 MG: 500 TABLET ORAL at 15:41

## 2018-09-16 RX ADMIN — FLUTICASONE PROPIONATE 1 PUFF: 110 AEROSOL, METERED RESPIRATORY (INHALATION) at 20:55

## 2018-09-16 RX ADMIN — ACETAMINOPHEN 975 MG: 325 TABLET ORAL at 21:50

## 2018-09-16 RX ADMIN — DICLOFENAC 2 G: 10 GEL TOPICAL at 21:49

## 2018-09-16 RX ADMIN — ACETAMINOPHEN 975 MG: 325 TABLET ORAL at 15:00

## 2018-09-16 RX ADMIN — FUROSEMIDE 40 MG: 40 TABLET ORAL at 09:02

## 2018-09-16 RX ADMIN — POTASSIUM CHLORIDE 40 MEQ: 1500 TABLET, EXTENDED RELEASE ORAL at 09:00

## 2018-09-16 RX ADMIN — METRONIDAZOLE 500 MG: 500 TABLET ORAL at 09:00

## 2018-09-16 RX ADMIN — VITAMIN D, TAB 1000IU (100/BT) 1000 UNITS: 25 TAB at 09:02

## 2018-09-16 RX ADMIN — INSULIN LISPRO 1 UNITS: 100 INJECTION, SOLUTION INTRAVENOUS; SUBCUTANEOUS at 17:28

## 2018-09-16 RX ADMIN — ACETAMINOPHEN 975 MG: 325 TABLET ORAL at 06:12

## 2018-09-16 RX ADMIN — POLYETHYLENE GLYCOL 3350 17 G: 17 POWDER, FOR SOLUTION ORAL at 09:02

## 2018-09-16 RX ADMIN — CEFEPIME HYDROCHLORIDE 2000 MG: 2 INJECTION, POWDER, FOR SOLUTION INTRAVENOUS at 06:12

## 2018-09-16 RX ADMIN — LEVETIRACETAM 750 MG: 750 TABLET, FILM COATED ORAL at 09:02

## 2018-09-16 RX ADMIN — INSULIN LISPRO 1 UNITS: 100 INJECTION, SOLUTION INTRAVENOUS; SUBCUTANEOUS at 11:09

## 2018-09-16 RX ADMIN — CEFEPIME HYDROCHLORIDE 2000 MG: 2 INJECTION, POWDER, FOR SOLUTION INTRAVENOUS at 15:42

## 2018-09-16 RX ADMIN — AMIODARONE HYDROCHLORIDE 200 MG: 200 TABLET ORAL at 09:02

## 2018-09-16 NOTE — ANESTHESIA PREPROCEDURE EVALUATION
Review of Systems/Medical History          Cardiovascular  EKG reviewed, Hyperlipidemia, Hypertension , Valve repair (TAVR) aortic valve  repair, Past MI , CAD , History of CABG, Dysrhythmias (NON SUSTAINED) , atrial flutter, atrial fibrillation, 1st degree block and ventricular tachycardia, CHF ,   Comment: EF-35%- 5/18  Inferor Akinesis, Pulmonary hypertension Pulmonary  Asthma ,        GI/Hepatic    GERD ,             Endo/Other  Diabetes , History of thyroid disease , hypothyroidism,      GYN       Hematology  Anemia anemia of chronic disease,     Musculoskeletal    Arthritis     Neurology    CVA , residual symptoms, Headaches,    Psychology           Physical Exam    Airway    Mallampati score: I  TM Distance: >3 FB  Neck ROM: full     Dental   lower dentures and upper dentures,     Cardiovascular      Pulmonary      Other Findings        Lab Results   Component Value Date    GLUCOSE 104 09/12/2018    ALT 17 09/08/2018    AST 26 09/08/2018    BUN 11 09/16/2018    CALCIUM 8 3 09/16/2018     09/16/2018    CHOL 187 10/14/2015    CO2 27 09/16/2018    CREATININE 0 65 09/16/2018    HDL 35 (L) 04/18/2018    INR 1 52 (H) 09/09/2018    HCT 33 8 (L) 09/16/2018    HGB 10 7 (L) 09/16/2018    PROT 6 9 10/14/2015    HGBA1C 6 4 (H) 09/08/2018    MG 2 0 06/20/2018    PHOS 3 8 06/20/2018     09/16/2018    K 3 6 09/16/2018     09/16/2018    TRIG 99 04/18/2018    WBC 11 95 (H) 09/16/2018     Anesthesia Plan  ASA Score- 4     Anesthesia Type- general with ASA Monitors  Additional Monitors:   Airway Plan: ETT  Plan Factors-Patient not instructed to abstain from smoking on day of procedure  Patient did not smoke on day of surgery  Induction- intravenous  Postoperative Plan- Plan for postoperative opioid use  Informed Consent- Anesthetic plan and risks discussed with patient  I personally reviewed this patient with the CRNA   Discussed and agreed on the Anesthesia Plan with the CRNA Georgette Ormond

## 2018-09-16 NOTE — PROGRESS NOTES
Progress Note - Heather Renner [de-identified] y o  female MRN: [id number]    Unit/Bed#: [facility] 527-01 Encounter: [id number]      Assessment:  Gangrene of the left foot  Vascular surgery consultation note seen not a candidate or there is no availability of the vascularization plan is to above the knee amputation tomorrow patient is very upset about it but today she is much more acceptable to that as compared to before  Right toe gangrene status post angioplasty, most likely will need amputation of the toes with her Rollator followed up by the Podiatry and vascular surgery  Plan:   History of DVT/PE  · Heparin gtt  · Hold SCDs in setting of chronic lower extremity wounds     3   Coronary artery disease status post CABG  · Continue atorvastatin/aspirin  · Patient is a history of coronary artery disease she is on medications she described no chest pain or shortness of breath right now     4   Paroxysmal AFib  · Continue amiodarone 20 mg b i d  rate control  · Anticoagulation as above continue with anticoagulation     5   Chronic systolic heart failure (without exacerbation)  · Cont Lasix  · Patient is a multiple episodes of congestive cardiac failure I can hear bilateral crackles but otherwise there is no other signs of congestive failure we have to observe the patient        6   Elevated INR secondary to her Xarelto  · Anticoagulation as above  ·       7   Hypertension  · Continue Amlodipine  · Lisinopril held  · IV labetalol 5 mg q 6 hours p r n      8   Acute kidney injury  · Resolved     9   Type 2 diabetes mellitus     · BG's normal low range   Decrease Lantus to 14 units BID and sliding scal     10  Anemia secondary to blood loss is stable hemoglobin is 9 8 and 31  Subjective:   Very upset very depress because the bypass was not able to be done         Objective:     Vitals: Blood pressure 140/63, pulse 68, temperature 98 4 °F (36 9 °C), temperature source Oral, resp   rate 18, height 5' 4" (1 626 m), weight 71 7 kg (158 lb 1 1 oz), SpO2 95 %, not currently breastfeeding  ,Body mass index is 27 13 kg/m²  Intake/Output Summary (Last 24 hours) at 09/16/18 0936  Last data filed at 09/16/18 3710   Gross per 24 hour   Intake           810 55 ml   Output             1800 ml   Net          -989 45 ml       Physical Exam:   HEENT examination is unremarkable  Neck is supple no JVD no lymphadenopathy no thyromegaly  CVS is normal S1 and S2 with irregularly regular heart sound but can controlled ventricular response  Chest bilateral crackles at the base  Periphery there is no edema gangrene of the left big toe and adjacent toe was noted with surrounding erythema and the yellowish discoloration of the skin on the right side 1 toys affected with the gangrene of the tip  Patient is anxious depress frustrated  All labs of the patient reviewed  Invasive Devices     Peripheral Intravenous Line            Peripheral IV 09/15/18 Left Hand 1 day    Peripheral IV 09/16/18 Right Forearm less than 1 day          Drain            Urethral Catheter Non-latex;Straight-tip 2 days                Lab, Imaging and other studies: I have personally reviewed pertinent reports      VTE Pharmacologic Prophylaxis: Sequential compression device (Venodyne)   VTE Mechanical Prophylaxis: sequential compression device

## 2018-09-17 ENCOUNTER — ANESTHESIA (INPATIENT)
Dept: PERIOP | Facility: HOSPITAL | Age: 78
DRG: 239 | End: 2018-09-17
Payer: MEDICARE

## 2018-09-17 LAB
ABO GROUP BLD: NORMAL
ANION GAP SERPL CALCULATED.3IONS-SCNC: 5 MMOL/L (ref 4–13)
APTT PPP: 73 SECONDS (ref 24–36)
BASOPHILS # BLD AUTO: 0.04 THOUSANDS/ΜL (ref 0–0.1)
BASOPHILS NFR BLD AUTO: 0 % (ref 0–1)
BLD GP AB SCN SERPL QL: NEGATIVE
BUN SERPL-MCNC: 10 MG/DL (ref 5–25)
CALCIUM SERPL-MCNC: 8.2 MG/DL (ref 8.3–10.1)
CHLORIDE SERPL-SCNC: 105 MMOL/L (ref 100–108)
CO2 SERPL-SCNC: 28 MMOL/L (ref 21–32)
CREAT SERPL-MCNC: 0.62 MG/DL (ref 0.6–1.3)
EOSINOPHIL # BLD AUTO: 0.23 THOUSAND/ΜL (ref 0–0.61)
EOSINOPHIL NFR BLD AUTO: 2 % (ref 0–6)
ERYTHROCYTE [DISTWIDTH] IN BLOOD BY AUTOMATED COUNT: 20.1 % (ref 11.6–15.1)
GFR SERPL CREATININE-BSD FRML MDRD: 85 ML/MIN/1.73SQ M
GLUCOSE SERPL-MCNC: 114 MG/DL (ref 65–140)
GLUCOSE SERPL-MCNC: 161 MG/DL (ref 65–140)
GLUCOSE SERPL-MCNC: 86 MG/DL (ref 65–140)
GLUCOSE SERPL-MCNC: 98 MG/DL (ref 65–140)
GLUCOSE SERPL-MCNC: 98 MG/DL (ref 65–140)
HCT VFR BLD AUTO: 34.4 % (ref 34.8–46.1)
HGB BLD-MCNC: 10.7 G/DL (ref 11.5–15.4)
IMM GRANULOCYTES # BLD AUTO: 0.09 THOUSAND/UL (ref 0–0.2)
IMM GRANULOCYTES NFR BLD AUTO: 1 % (ref 0–2)
LYMPHOCYTES # BLD AUTO: 1.49 THOUSANDS/ΜL (ref 0.6–4.47)
LYMPHOCYTES NFR BLD AUTO: 14 % (ref 14–44)
MCH RBC QN AUTO: 26.1 PG (ref 26.8–34.3)
MCHC RBC AUTO-ENTMCNC: 31.1 G/DL (ref 31.4–37.4)
MCV RBC AUTO: 84 FL (ref 82–98)
MONOCYTES # BLD AUTO: 1.2 THOUSAND/ΜL (ref 0.17–1.22)
MONOCYTES NFR BLD AUTO: 11 % (ref 4–12)
NEUTROPHILS # BLD AUTO: 7.56 THOUSANDS/ΜL (ref 1.85–7.62)
NEUTS SEG NFR BLD AUTO: 72 % (ref 43–75)
NRBC BLD AUTO-RTO: 0 /100 WBCS
PLATELET # BLD AUTO: 282 THOUSANDS/UL (ref 149–390)
PMV BLD AUTO: 10.5 FL (ref 8.9–12.7)
POTASSIUM SERPL-SCNC: 3.7 MMOL/L (ref 3.5–5.3)
RBC # BLD AUTO: 4.1 MILLION/UL (ref 3.81–5.12)
RH BLD: POSITIVE
SODIUM SERPL-SCNC: 138 MMOL/L (ref 136–145)
SPECIMEN EXPIRATION DATE: NORMAL
WBC # BLD AUTO: 10.61 THOUSAND/UL (ref 4.31–10.16)

## 2018-09-17 PROCEDURE — 86923 COMPATIBILITY TEST ELECTRIC: CPT

## 2018-09-17 PROCEDURE — 82948 REAGENT STRIP/BLOOD GLUCOSE: CPT

## 2018-09-17 PROCEDURE — 80048 BASIC METABOLIC PNL TOTAL CA: CPT | Performed by: SURGERY

## 2018-09-17 PROCEDURE — 99233 SBSQ HOSP IP/OBS HIGH 50: CPT | Performed by: INTERNAL MEDICINE

## 2018-09-17 PROCEDURE — 88300 SURGICAL PATH GROSS: CPT | Performed by: PATHOLOGY

## 2018-09-17 PROCEDURE — 86900 BLOOD TYPING SEROLOGIC ABO: CPT | Performed by: SURGERY

## 2018-09-17 PROCEDURE — 0Y6J0Z1 DETACHMENT AT LEFT LOWER LEG, HIGH, OPEN APPROACH: ICD-10-PCS | Performed by: SURGERY

## 2018-09-17 PROCEDURE — 85730 THROMBOPLASTIN TIME PARTIAL: CPT | Performed by: SURGERY

## 2018-09-17 PROCEDURE — 99024 POSTOP FOLLOW-UP VISIT: CPT | Performed by: SURGERY

## 2018-09-17 PROCEDURE — 86850 RBC ANTIBODY SCREEN: CPT | Performed by: SURGERY

## 2018-09-17 PROCEDURE — 86901 BLOOD TYPING SEROLOGIC RH(D): CPT | Performed by: SURGERY

## 2018-09-17 PROCEDURE — 85025 COMPLETE CBC W/AUTO DIFF WBC: CPT | Performed by: SURGERY

## 2018-09-17 RX ORDER — HEPARIN SODIUM 5000 [USP'U]/ML
5000 INJECTION, SOLUTION INTRAVENOUS; SUBCUTANEOUS EVERY 8 HOURS SCHEDULED
Status: DISCONTINUED | OUTPATIENT
Start: 2018-09-17 | End: 2018-09-18

## 2018-09-17 RX ORDER — SODIUM CHLORIDE, SODIUM LACTATE, POTASSIUM CHLORIDE, CALCIUM CHLORIDE 600; 310; 30; 20 MG/100ML; MG/100ML; MG/100ML; MG/100ML
75 INJECTION, SOLUTION INTRAVENOUS CONTINUOUS
Status: DISCONTINUED | OUTPATIENT
Start: 2018-09-17 | End: 2018-09-17

## 2018-09-17 RX ORDER — GLYCOPYRROLATE 0.2 MG/ML
INJECTION INTRAMUSCULAR; INTRAVENOUS AS NEEDED
Status: DISCONTINUED | OUTPATIENT
Start: 2018-09-17 | End: 2018-09-17 | Stop reason: SURG

## 2018-09-17 RX ORDER — SODIUM CHLORIDE, SODIUM LACTATE, POTASSIUM CHLORIDE, CALCIUM CHLORIDE 600; 310; 30; 20 MG/100ML; MG/100ML; MG/100ML; MG/100ML
75 INJECTION, SOLUTION INTRAVENOUS CONTINUOUS
Status: DISCONTINUED | OUTPATIENT
Start: 2018-09-17 | End: 2018-09-18

## 2018-09-17 RX ORDER — LIDOCAINE HYDROCHLORIDE 10 MG/ML
INJECTION, SOLUTION INFILTRATION; PERINEURAL AS NEEDED
Status: DISCONTINUED | OUTPATIENT
Start: 2018-09-17 | End: 2018-09-17 | Stop reason: SURG

## 2018-09-17 RX ORDER — INSULIN GLARGINE 100 [IU]/ML
12 INJECTION, SOLUTION SUBCUTANEOUS 2 TIMES DAILY
Status: DISCONTINUED | OUTPATIENT
Start: 2018-09-17 | End: 2018-09-20

## 2018-09-17 RX ORDER — EPHEDRINE SULFATE 50 MG/ML
INJECTION, SOLUTION INTRAVENOUS AS NEEDED
Status: DISCONTINUED | OUTPATIENT
Start: 2018-09-17 | End: 2018-09-17 | Stop reason: SURG

## 2018-09-17 RX ORDER — SODIUM CHLORIDE, SODIUM LACTATE, POTASSIUM CHLORIDE, CALCIUM CHLORIDE 600; 310; 30; 20 MG/100ML; MG/100ML; MG/100ML; MG/100ML
INJECTION, SOLUTION INTRAVENOUS CONTINUOUS PRN
Status: DISCONTINUED | OUTPATIENT
Start: 2018-09-17 | End: 2018-09-17

## 2018-09-17 RX ORDER — SODIUM CHLORIDE 9 MG/ML
INJECTION, SOLUTION INTRAVENOUS AS NEEDED
Status: DISCONTINUED | OUTPATIENT
Start: 2018-09-17 | End: 2018-09-17 | Stop reason: HOSPADM

## 2018-09-17 RX ORDER — DEXAMETHASONE SODIUM PHOSPHATE 4 MG/ML
4 INJECTION, SOLUTION INTRA-ARTICULAR; INTRALESIONAL; INTRAMUSCULAR; INTRAVENOUS; SOFT TISSUE ONCE
Status: DISCONTINUED | OUTPATIENT
Start: 2018-09-17 | End: 2018-09-17 | Stop reason: HOSPADM

## 2018-09-17 RX ORDER — SODIUM CHLORIDE 9 MG/ML
INJECTION, SOLUTION INTRAVENOUS CONTINUOUS PRN
Status: DISCONTINUED | OUTPATIENT
Start: 2018-09-17 | End: 2018-09-17

## 2018-09-17 RX ORDER — FENTANYL CITRATE 50 UG/ML
INJECTION, SOLUTION INTRAMUSCULAR; INTRAVENOUS AS NEEDED
Status: DISCONTINUED | OUTPATIENT
Start: 2018-09-17 | End: 2018-09-17 | Stop reason: SURG

## 2018-09-17 RX ORDER — PROPOFOL 10 MG/ML
INJECTION, EMULSION INTRAVENOUS AS NEEDED
Status: DISCONTINUED | OUTPATIENT
Start: 2018-09-17 | End: 2018-09-17 | Stop reason: SURG

## 2018-09-17 RX ORDER — ROCURONIUM BROMIDE 10 MG/ML
INJECTION, SOLUTION INTRAVENOUS AS NEEDED
Status: DISCONTINUED | OUTPATIENT
Start: 2018-09-17 | End: 2018-09-17 | Stop reason: SURG

## 2018-09-17 RX ORDER — FENTANYL CITRATE/PF 50 MCG/ML
25 SYRINGE (ML) INJECTION
Status: DISCONTINUED | OUTPATIENT
Start: 2018-09-17 | End: 2018-09-17 | Stop reason: HOSPADM

## 2018-09-17 RX ADMIN — ACETAMINOPHEN 975 MG: 325 TABLET ORAL at 05:01

## 2018-09-17 RX ADMIN — HEPARIN SODIUM 5000 UNITS: 5000 INJECTION, SOLUTION INTRAVENOUS; SUBCUTANEOUS at 21:19

## 2018-09-17 RX ADMIN — AMIODARONE HYDROCHLORIDE 200 MG: 200 TABLET ORAL at 09:41

## 2018-09-17 RX ADMIN — HYDROMORPHONE HYDROCHLORIDE 0.2 MG: 1 INJECTION, SOLUTION INTRAMUSCULAR; INTRAVENOUS; SUBCUTANEOUS at 18:49

## 2018-09-17 RX ADMIN — FLUTICASONE PROPIONATE 1 PUFF: 110 AEROSOL, METERED RESPIRATORY (INHALATION) at 09:42

## 2018-09-17 RX ADMIN — HYDROMORPHONE HYDROCHLORIDE 0.2 MG: 1 INJECTION, SOLUTION INTRAMUSCULAR; INTRAVENOUS; SUBCUTANEOUS at 18:55

## 2018-09-17 RX ADMIN — HYDROMORPHONE HYDROCHLORIDE 0.5 MG: 1 INJECTION, SOLUTION INTRAMUSCULAR; INTRAVENOUS; SUBCUTANEOUS at 10:09

## 2018-09-17 RX ADMIN — LIDOCAINE HYDROCHLORIDE 70 MG: 10 INJECTION, SOLUTION INFILTRATION; PERINEURAL at 15:55

## 2018-09-17 RX ADMIN — GABAPENTIN 200 MG: 100 CAPSULE ORAL at 21:24

## 2018-09-17 RX ADMIN — SODIUM CHLORIDE, SODIUM LACTATE, POTASSIUM CHLORIDE, AND CALCIUM CHLORIDE 75 ML/HR: .6; .31; .03; .02 INJECTION, SOLUTION INTRAVENOUS at 21:47

## 2018-09-17 RX ADMIN — ROCURONIUM BROMIDE 50 MG: 10 INJECTION INTRAVENOUS at 15:55

## 2018-09-17 RX ADMIN — FENTANYL CITRATE 25 MCG: 50 INJECTION, SOLUTION INTRAMUSCULAR; INTRAVENOUS at 18:46

## 2018-09-17 RX ADMIN — CEFEPIME HYDROCHLORIDE 2000 MG: 2 INJECTION, POWDER, FOR SOLUTION INTRAVENOUS at 04:57

## 2018-09-17 RX ADMIN — SODIUM CHLORIDE, SODIUM LACTATE, POTASSIUM CHLORIDE, AND CALCIUM CHLORIDE: .6; .31; .03; .02 INJECTION, SOLUTION INTRAVENOUS at 15:47

## 2018-09-17 RX ADMIN — NEOSTIGMINE METHYLSULFATE 3 MG: 1 INJECTION, SOLUTION INTRAMUSCULAR; INTRAVENOUS; SUBCUTANEOUS at 17:53

## 2018-09-17 RX ADMIN — MAGNESIUM OXIDE TAB 400 MG (241.3 MG ELEMENTAL MG) 400 MG: 400 (241.3 MG) TAB at 09:40

## 2018-09-17 RX ADMIN — PROPOFOL 120 MG: 10 INJECTION, EMULSION INTRAVENOUS at 15:55

## 2018-09-17 RX ADMIN — SODIUM CHLORIDE, SODIUM LACTATE, POTASSIUM CHLORIDE, AND CALCIUM CHLORIDE: .6; .31; .03; .02 INJECTION, SOLUTION INTRAVENOUS at 17:56

## 2018-09-17 RX ADMIN — FENTANYL CITRATE 25 MCG: 50 INJECTION, SOLUTION INTRAMUSCULAR; INTRAVENOUS at 15:48

## 2018-09-17 RX ADMIN — FLUTICASONE PROPIONATE 1 PUFF: 110 AEROSOL, METERED RESPIRATORY (INHALATION) at 21:19

## 2018-09-17 RX ADMIN — EPHEDRINE SULFATE 5 MG: 50 INJECTION, SOLUTION INTRAMUSCULAR; INTRAVENOUS; SUBCUTANEOUS at 16:33

## 2018-09-17 RX ADMIN — HYDROMORPHONE HYDROCHLORIDE 0.25 MG: 1 INJECTION, SOLUTION INTRAMUSCULAR; INTRAVENOUS; SUBCUTANEOUS at 16:40

## 2018-09-17 RX ADMIN — EPHEDRINE SULFATE 5 MG: 50 INJECTION, SOLUTION INTRAMUSCULAR; INTRAVENOUS; SUBCUTANEOUS at 16:49

## 2018-09-17 RX ADMIN — ACETAMINOPHEN 975 MG: 325 TABLET ORAL at 21:19

## 2018-09-17 RX ADMIN — FENTANYL CITRATE 75 MCG: 50 INJECTION, SOLUTION INTRAMUSCULAR; INTRAVENOUS at 16:20

## 2018-09-17 RX ADMIN — HYDROMORPHONE HYDROCHLORIDE 0.5 MG: 1 INJECTION, SOLUTION INTRAMUSCULAR; INTRAVENOUS; SUBCUTANEOUS at 21:52

## 2018-09-17 RX ADMIN — VITAMIN D, TAB 1000IU (100/BT) 1000 UNITS: 25 TAB at 09:41

## 2018-09-17 RX ADMIN — HYDROMORPHONE HYDROCHLORIDE 0.2 MG: 1 INJECTION, SOLUTION INTRAMUSCULAR; INTRAVENOUS; SUBCUTANEOUS at 19:02

## 2018-09-17 RX ADMIN — ATORVASTATIN CALCIUM 80 MG: 80 TABLET, FILM COATED ORAL at 21:24

## 2018-09-17 RX ADMIN — FENTANYL CITRATE 25 MCG: 50 INJECTION, SOLUTION INTRAMUSCULAR; INTRAVENOUS at 18:32

## 2018-09-17 RX ADMIN — HYDROMORPHONE HYDROCHLORIDE 0.25 MG: 1 INJECTION, SOLUTION INTRAMUSCULAR; INTRAVENOUS; SUBCUTANEOUS at 17:14

## 2018-09-17 RX ADMIN — PROCHLORPERAZINE MALEATE 5 MG: 5 TABLET, FILM COATED ORAL at 00:04

## 2018-09-17 RX ADMIN — METRONIDAZOLE 500 MG: 500 TABLET ORAL at 09:41

## 2018-09-17 RX ADMIN — EPHEDRINE SULFATE 5 MG: 50 INJECTION, SOLUTION INTRAMUSCULAR; INTRAVENOUS; SUBCUTANEOUS at 17:47

## 2018-09-17 RX ADMIN — FENTANYL CITRATE 25 MCG: 50 INJECTION, SOLUTION INTRAMUSCULAR; INTRAVENOUS at 18:41

## 2018-09-17 RX ADMIN — Medication 1 TABLET: at 21:19

## 2018-09-17 RX ADMIN — OXYCODONE HYDROCHLORIDE 5 MG: 5 TABLET ORAL at 20:05

## 2018-09-17 RX ADMIN — OXYCODONE HYDROCHLORIDE 5 MG: 5 TABLET ORAL at 14:03

## 2018-09-17 RX ADMIN — FUROSEMIDE 40 MG: 40 TABLET ORAL at 09:41

## 2018-09-17 RX ADMIN — AMLODIPINE BESYLATE 10 MG: 10 TABLET ORAL at 09:41

## 2018-09-17 RX ADMIN — METRONIDAZOLE 500 MG: 500 TABLET ORAL at 23:48

## 2018-09-17 RX ADMIN — ACETAMINOPHEN 975 MG: 325 TABLET ORAL at 14:02

## 2018-09-17 RX ADMIN — INSULIN GLARGINE 12 UNITS: 100 INJECTION, SOLUTION SUBCUTANEOUS at 21:24

## 2018-09-17 RX ADMIN — GABAPENTIN 200 MG: 100 CAPSULE ORAL at 09:41

## 2018-09-17 RX ADMIN — CEFEPIME HYDROCHLORIDE 2000 MG: 2 INJECTION, POWDER, FOR SOLUTION INTRAVENOUS at 16:13

## 2018-09-17 RX ADMIN — METRONIDAZOLE 500 MG: 500 TABLET ORAL at 00:01

## 2018-09-17 RX ADMIN — INSULIN LISPRO 1 UNITS: 100 INJECTION, SOLUTION INTRAVENOUS; SUBCUTANEOUS at 21:20

## 2018-09-17 RX ADMIN — DEXAMETHASONE SODIUM PHOSPHATE 5 MG: 10 INJECTION INTRAMUSCULAR; INTRAVENOUS at 16:04

## 2018-09-17 RX ADMIN — PROCHLORPERAZINE MALEATE 5 MG: 5 TABLET, FILM COATED ORAL at 20:34

## 2018-09-17 RX ADMIN — AMIODARONE HYDROCHLORIDE 200 MG: 200 TABLET ORAL at 21:24

## 2018-09-17 RX ADMIN — OXYCODONE HYDROCHLORIDE 5 MG: 5 TABLET ORAL at 02:58

## 2018-09-17 RX ADMIN — LEVOTHYROXINE SODIUM 100 MCG: 100 TABLET ORAL at 05:01

## 2018-09-17 RX ADMIN — GLYCOPYRROLATE 0.6 MG: 0.2 INJECTION, SOLUTION INTRAMUSCULAR; INTRAVENOUS at 17:53

## 2018-09-17 RX ADMIN — FENTANYL CITRATE 25 MCG: 50 INJECTION, SOLUTION INTRAMUSCULAR; INTRAVENOUS at 18:27

## 2018-09-17 RX ADMIN — LEVETIRACETAM 750 MG: 750 TABLET, FILM COATED ORAL at 09:41

## 2018-09-17 NOTE — OP NOTE
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 ([facility] [address] ) [I70 269]    Procedure(s) (LRB):  AMPUTATION BELOW KNEE (BKA) (Left)    Specimen(s):  ID Type Source Tests Collected by Time Destination   1 :  Tissue Leg, Left TISSUE EXAM Nehemias Stanofrd MD 2018 [id number]        Estimated Blood Loss:   200 mL    Drains:  Urethral Catheter Non-latex;Straight-tip (Active)   Amt returned on insertion(mL) 350 mL 9/15/2018  5:18 PM   Site Assessment Clean;Skin intact 2018  9:00 AM   Collection Container Standard drainage bag 2018  9:00 AM   Securement Method Securing device (Describe) 2018  9:00 AM   Output (mL) 300 mL 2018  1:00 PM   Number of days: 3       Anesthesia Type:   General    Operative Indications:  PAD (peripheral artery disease) ([address] ) [I73 9]  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 ([facility] [address] ) [I70 269]      Operative Findings:  Viable tissues    Complications:   None    Procedure and Technique:  The procedure was performed under  General anesthesia  The patient was placed supine  The left lower extremity was prepped and draped in the usual sterile fashion  An occlusive dressing was applied to the foot up to the level of the ankle  A tourniquet was applied and Eschmarch bandage was used to exanguinate the leg and then tourniquet was inflated to 300 mmHG  Anterior and posterior skin incisions were outlined with a marking pen   The anterior skin incision was made 10 cm below the tibial tuberosity and extended medially and laterally toward the edges of the gastrocnemius muscle  The skin incision was then extended distally on either side parallel to the tibia for 12  Cm, creating a posterior flap  The skin and subcutaneous tissues were incised down to the fascia  The greater and short saphenous veins on the medial and posterior aspects of the leg, respectively, were ligated and divided  The fascia and the muscles were then divided with the electrocautery at the same level of the anterior skin incision  The muscles in the anterior and lateral compartment were divided, exposing the anterior tibial vessels, which were ligated and divided  The interosseous membrane was then incised  The tibial periosteum was incised circumferentially with the electrocautery at the same level of the skin and muscle division  Using a periosteal elevator, the tibial periosteum was stripped proximally for 2 cm  The tibia was then transected with a electric saw 2 cm proximal to the skin incision with an anterior bevel  The fibula was then exposed, dissected circumferentially, and transected with a bone cutter 2 cm proximal to the tibial division  The amputation was then completed with an amputation knife, transecting the soleus muscle obliquely and the gastrocnemius muscle at the same level as the posterior flap  Bleeding soleal veins and posterior tibial and peroneal vessels were clamped and oversewn with 0 silk sutures  Sharp bony edges were then filed, eliminating any bony prominences over the anterior aspect of the tibia  The fascia of the anterior and posterior muscle flaps were approximated with interrupted 3-0 PDS  The skin was approximated with interrupted 3-0 nylon sutures and dressed with 4 × 4 gauze, Kerlix, and an stockinet bandage and knee immobilizer  The patient tolerated the procedure well and was taken to the postanesthesia care    I was present for the entire procedure    Patient Disposition:  PACU     SIGNATURE: Rhiannon Gresham MD  DATE: September 17, 2018  TIME: 5:57 PM

## 2018-09-17 NOTE — PLAN OF CARE

## 2018-09-17 NOTE — H&P (VIEW-ONLY)
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 with compromise distal circulation  Planning exploration of the plantar system left foot with possible popliteal to plantar bypass with greater saphenous vein possibly contralateral   Cardiac risk assessment was obtained in the hospital, surgery  to be scheduled as soon as possible         Diagnoses and all orders for this visit:    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 [facility])  -     Case request operating room: Exploration left plantar artery with possible popliteal to plantar bypass greater saphenous vein possible contralateral vein; Standing  -     Basic metabolic panel; Future  -     CBC and differential; Future  -     Case request operating room: Exploration left plantar artery with possible popliteal to plantar bypass greater saphenous vein possible contralateral vein    Atherosclerosis of native artery of both lower extremities with bilateral ulceration of other part of feet (HCC)    Other orders  -     Diet NPO; Sips with meds; Standing  -     Void on call to OR; Standing  -     Insert peripheral IV; Standing  -     Place sequential compression device; Standing  -     ceFAZolin (ANCEF) IVPB (premix) 1,000 mg; Infuse 50 mL (1,000 mg total) into a venous catheter once         Subjective:      Patient ID: Jonas Reed is a [de-identified] y o  female  HPI bilateral gangrene more severe on the left than the right recent endovascular intervention to the right leg with improved circulation    Left leg requires bypass surgery being planned in the next week or 2    The following portions of the patient's history were reviewed and updated as appropriate: allergies, current medications, past family history, past medical history, past social history, past surgical history and problem list     Review of Systems  bilateral gangrene, mild dementia all other systems negative    Objective:      /82 (BP Location: Right arm, Patient Position: Sitting, Cuff Size: Adult)   Temp 97 7 °F (36 5 °C) (Tympanic)   Ht 5' 4" (1 626 m)   Wt 68 9 kg (152 lb) Comment: per ot  LMP  (LMP Unknown)   BMI 26 09 kg/m²          Physical Exam       Physical Exam      Oriented x3 no evidence of clinical depression  Neck is supple carotid pulses equal bilaterally no bruits heard    Chest lungs clear, heart regular rhythm  Abdomen soft nontender no evidence of pulsatile masses  Pulses are palpable bilateral femoral popliteal, no pulses palpable at the foot  Dry gangrene posterior aspect of the left great toe and 2nd toe no evidence of cellulitis or lymphangitis at this time      Neurological exam intact cranial nerves 2-12 grossly intact no gross motor sensory deficits detected    Vitals:    09/04/18 1600   BP: 138/82   BP Location: Right arm   Patient Position: Sitting   Cuff Size: Adult   Temp: 97 7 °F (36 5 °C)   TempSrc: Tympanic   Weight: 68 9 kg (152 lb)   Height: 5' 4" (1 626 m)       Patient Active Problem List   Diagnosis    Essential hypertension    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 native heart without angina pectoris    Type 2 diabetes mellitus with complication, with long-term current use of insulin (HCC)    Atrial flutter (HCC)    Hyperlipidemia    Gastroesophageal reflux disease without esophagitis    Moderate mitral stenosis    Arthritis of hand    Acute respiratory failure with hypoxia (HCC)    Asthma    Asymptomatic carotid artery stenosis, bilateral    Atherosclerosis of native artery of both lower extremities with bilateral ulceration (HCC)    Hx of CABG    Chronic anticoagulation    Chronic combined systolic and diastolic congestive heart failure (HCC)    Degenerative joint disease involving multiple joints    Diabetic retinopathy (HCC)    Postoperative hypothyroidism    Migraine headache    Nephrolithiasis  Osteoarthritis of both knees    Paroxysmal atrial fibrillation (HCC)    Ulcer of toe of left foot (HCC)    Ulcer of toe of right foot (HCC)    Prolonged Q-T interval on ECG    Hypokalemia    Hypocalcemia    Left bundle branch block (LBBB)    1st degree AV block    S/P TAVR (transcatheter aortic valve replacement)    Expressive aphasia    Multiple lacunar infarcts (HCC)    Vomiting    History of CVA (cerebrovascular accident)    Seizure ([address] )    Supratherapeutic INR    NSVT (nonsustained ventricular tachycardia) (HCC)    Pulmonary hypertension (HCC)    Anemia    Severe sepsis (HCC)    Lactic acidosis    Hypomagnesemia    Bradycardia    Chest pain    Foot pain    PAD (peripheral artery disease) (HCC)    Cellulitis    CKD (chronic kidney disease) stage 3, GFR 30-59 ml/min       Past Surgical History:   Procedure Laterality Date    APPENDECTOMY      ARTERIOGRAM  12/19/2017    CARDIAC CATHETERIZATION      CHOLECYSTECTOMY      COLONOSCOPY      CORONARY ARTERY BYPASS GRAFT  2004    IR ABDOMINAL ANGIOGRAPHY / INTERVENTION  8/10/2018    IR ABDOMINAL ANGIOGRAPHY / INTERVENTION  8/21/2018    LUMBAR LAMINECTOMY      OK ECHO TRANSESOPHAG R-T 2D W/PRB IMG ACQUISJ I&R N/A 4/3/2018    Procedure: TRANSESOPHAGEAL ECHOCARDIOGRAM (ROBB);   Surgeon: Alma Guillen DO;  Location: [facility];  Service: Cardiac Surgery    OK REPLACE AORTIC VALVE OPENFEMORAL ARTERY APPROACH N/A 4/3/2018    Procedure: REPLACEMENT AORTIC VALVE TRANSCATHETER (TAVR) TRANSFEMORAL w/ 26MM IVY YEVGENIY S3 VALVE;  Surgeon: Alma Guillen DO;  Location: [facility];  Service: Cardiac Surgery    THYROIDECTOMY      TOTAL ABDOMINAL HYSTERECTOMY W/ BILATERAL SALPINGOOPHORECTOMY         Family History   Problem Relation Age of Onset    Cancer Mother     Stroke Mother     Cancer Father     Heart disease Father     Breast cancer Sister     Diabetes Daughter         Passed away January 2017        Social History Social History    Marital status:      Spouse name: N/A    Number of children: N/A    Years of education: N/A     Occupational History    Not on file       Social History Main Topics    Smoking status: Never Smoker    Smokeless tobacco: Never Used    Alcohol use No    Drug use: No    Sexual activity: Not on file     Other Topics Concern    Not on file     Social History Narrative    No narrative on file       Allergies   Allergen Reactions    Other Chest Pain     IVP-listed as "chest pain" in previous chart, patient stated this occurred "a long time ago" but does not remember exactly occurred     Contrast [Iodinated Diagnostic Agents] Other (See Comments)     Flash pulm edema    Doxycycline Chest Pain    Ketorolac Other (See Comments)     Chest pain     Levaquin [Levofloxacin] Chest Pain    Ondansetron Chest Pain     Prolonged QT    Toradol [Ketorolac Tromethamine] Chest Pain         Current Outpatient Prescriptions:     acetaminophen (TYLENOL) 325 mg tablet, Take 3 tablets (975 mg total) by mouth every 8 (eight) hours, Disp: 180 tablet, Rfl: 0    acetaminophen (TYLENOL) 650 mg CR tablet, Take 1 tablet (650 mg total) by mouth every 8 (eight) hours as needed for mild pain Do not take in conjunction with Percocet , Disp: 90 tablet, Rfl: 0    amiodarone 200 mg tablet, Take 1 tablet (200 mg total) by mouth 2 (two) times a day, Disp: 60 tablet, Rfl: 3    amLODIPine (NORVASC) 10 mg tablet, Take 1 tablet (10 mg total) by mouth daily, Disp: 30 tablet, Rfl: 0    aspirin 81 MG tablet, Take 81 mg by mouth daily, Disp: , Rfl:     atorvastatin (LIPITOR) 80 mg tablet, Take 1 tablet (80 mg total) by mouth daily, Disp: 30 tablet, Rfl: 2    calcium carbonate (TUMS) 500 mg chewable tablet, Chew 2 tablets 3 (three) times a day  , Disp: , Rfl:     Cholecalciferol (VITAMIN D3) 1000 units CHEW, Chew 1 tablet (1,000 Units total) 2 (two) times a day, Disp: 60 tablet, Rfl: 5    docusate sodium (COLACE) 100 mg capsule, Take 1 capsule (100 mg total) by mouth 2 (two) times a day, Disp: 60 capsule, Rfl: 0    fentaNYL (DURAGESIC) 25 mcg/hr, Place 1 patch on the skin every third day for 10 doses Earliest Fill Date: 8/26/18 Max Daily Amount: 1 patch, Disp: 10 patch, Rfl: 0    ferrous sulfate 325 (65 Fe) mg tablet, Take 1 tablet (325 mg total) by mouth every other day, Disp: 30 tablet, Rfl: 0    fluticasone (FLONASE) 50 mcg/act nasal spray, 1 spray into each nostril daily, Disp: , Rfl:     furosemide (LASIX) 40 mg tablet, Take 1 tablet (40 mg total) by mouth daily, Disp: 90 tablet, Rfl: 3    gabapentin (NEURONTIN) 100 mg capsule, Take 1 capsule (100 mg total) by mouth 3 (three) times a day, Disp: 90 capsule, Rfl: 0    glucose blood test strip, 1 each by Other route 4 (four) times a day (before meals and at bedtime) Use as instructed, Disp: , Rfl:     Insulin Syringe-Needle U-100 (BD INSULIN SYRINGE ULTRAFINE) 31G X 5/16" 1 ML MISC, Inject under the skin 2 (two) times a day, Disp: 200 each, Rfl: 3    LANTUS 100 UNIT/ML subcutaneous injection, Inject 20 Units under the skin 2 (two) times a day, Disp: 10 mL, Rfl: 0    levETIRAcetam (KEPPRA) 750 mg tablet, Take 1 tablet (750 mg total) by mouth every 12 (twelve) hours, Disp: 60 tablet, Rfl: 11    levothyroxine 100 mcg tablet, Take 1 tablet (100 mcg total) by mouth daily, Disp: 90 tablet, Rfl: 1    lisinopril (ZESTRIL) 10 mg tablet, Take 1 tablet (10 mg total) by mouth daily, Disp: 30 tablet, Rfl: 0    loratadine (CLARITIN) 10 mg tablet, Take 1 tablet (10 mg total) by mouth daily, Disp: 90 tablet, Rfl: 1    magnesium oxide (MAG-OX) 400 mg, Take 1 tablet (400 mg total) by mouth 2 (two) times a day, Disp: 180 tablet, Rfl: 1    metFORMIN (GLUCOPHAGE) 1000 MG tablet, 1,000 mg 2 (two) times a day, Disp: , Rfl:     metoclopramide (REGLAN) 10 mg tablet, Take 0 5 tablets (5 mg total) by mouth every 6 (six) hours as needed (Nausea and vomiting), Disp: 30 tablet, Rfl: 1   metoprolol succinate (TOPROL-XL) 25 mg 24 hr tablet, Take 0 5 tablets (12 5 mg total) by mouth daily at bedtime, Disp: 30 tablet, Rfl: 0    Mometasone Furoate (ASMANEX HFA) 100 MCG/ACT AERO, Inhale 2 puffs once daily, Disp: 1 Inhaler, Rfl: 5    nitroglycerin (NITROSTAT) 0 4 mg SL tablet, Place 0 4 mg under the tongue every 3 (three) minutes as needed, Disp: , Rfl:     pantoprazole (PROTONIX) 40 mg tablet, Take 40 mg by mouth daily, Disp: , Rfl:     phenazopyridine (PYRIDIUM) 200 mg tablet, Take 1 tablet (200 mg total) by mouth 3 (three) times a day with meals, Disp: 10 tablet, Rfl: 0    polyethylene glycol (MIRALAX) 17 g packet, Take 17 g by mouth daily as needed  , Disp: , Rfl:     potassium chloride (K-DUR,KLOR-CON) 10 mEq tablet, Take 1 tablet (10 mEq total) by mouth 2 (two) times a day, Disp: 60 tablet, Rfl: 2    rivaroxaban (XARELTO) 20 mg tablet, Take 1 tablet (20 mg total) by mouth daily, Disp: 90 tablet, Rfl: 0    albuterol (VENTOLIN HFA) 90 mcg/act inhaler, Inhale 108 mcg 2 (two) times a day as needed 2 puffs bid PRN, Disp: , Rfl:

## 2018-09-17 NOTE — NUTRITION
09/17/18 1145   Recommendations/Interventions   Interventions Diet: continued as ordered   Nutrition Recommendations Other (specify)  (When medically appropriate- advance diet to CCD1, 4gm Na; continue w/ glucerna TID)

## 2018-09-17 NOTE — INTERVAL H&P NOTE
H&P reviewed  She is s/p attempted revascularization of the LLE, unfortunately was noted to have unreconstructable PAD  Has progressive dry gangrenous changes to the left foot  Plan for Left BKA today

## 2018-09-17 NOTE — INTERIM OP NOTE
AMPUTATION BELOW KNEE (BKA)  Postoperative Note  PATIENT NAME: Miko Keller  : 1940  MRN: [id number]  [facility] 02    Surgery Date: 2018    Preop Diagnosis:  PAD (peripheral artery disease) (HCC) [I73 9]  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) [I70 269]    Post-Op Diagnosis Codes:     * PAD (peripheral artery disease) (HCC) [I73 9]     * 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) [I70 269]    Procedure(s) (LRB):  AMPUTATION BELOW KNEE (BKA) (Left)    Surgeon(s) and Role:     * Mary Ellen Aguayo MD - Primary     * Rustam pSivey MD - Assisting    Specimens:  ID Type Source Tests Collected by Time Destination   1 :  Tissue Leg, Left TISSUE EXAM Mary Ellen Aguayo MD 2018 [id number]        Estimated Blood Loss:   200 mL    Anesthesia Type:   General     Findings:    Left BKA, viable tissues  Complications:   None    SIGNATURE: Mary Ellen Aguayo MD   DATE: 2018   TIME: 5:57 PM

## 2018-09-17 NOTE — ANESTHESIA POSTPROCEDURE EVALUATION
Post-Op Assessment Note      CV Status:  Stable    Mental Status:  Alert and awake    Hydration Status:  Euvolemic    PONV Controlled:  Controlled    Airway Patency:  Patent    Post Op Vitals Reviewed: Yes          Staff: CRNA           BP   168/68   Temp   97 5   Pulse  95   Resp   18   SpO2   99

## 2018-09-17 NOTE — PERIOPERATIVE NURSING NOTE
MD Laura Sigala updated on patient's orientation status  MD alejandro with patient transfer to floor at this time

## 2018-09-17 NOTE — PROGRESS NOTES
Progress Note - Inpatient Pain Management    Gina Ulloa [de-identified] y o  female MRN: [id number]  Unit/Bed#: [facility] 527-01 Encounter: [id number]      Assessment:   Principal Problem: 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 [facility])  Active Problems:    Essential hypertension    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 native heart without angina pectoris    Type 2 diabetes mellitus with complication, with long-term current use of insulin ([facility])    Hyperlipidemia    Gastroesophageal reflux disease without esophagitis    Ulcer of toe of left foot ([facility])    Seizure ([facility])    Anemia    PAD (peripheral artery disease) ([facility])    Elevated INR    Hypothyroidism    Rotator cuff disorder, left    Wet gangrene ([facility])      Plan/Recommendations:   Acute on chronic pain  · Acetaminophen 975mg Q8 hours  · Gabapentin 200mg BID  · CrCl 49 65  · Discontinue Fentanyl patch today  · Dilaudid 0 5mg IV Q4 hours PRN breakthrough pain  · Oxycodone 5mg Q4 hours PRN severe pain  · Robaxin 250mg Q6 hours PRN LE spasms     Plan for OR for Left BKA today       Reviewed with Vascular Team    Pain History  Current pain location(s): left foot  Pain Scale:  0-8  Severity:  Severe at times  24 hour history: Patient resting in bed, left foot pain is present, unchanged overall  No new pain complaints  OR for Left BKA later this afternoon      Current Analgesic regimen:  Fentanyl 12mcg patch, Gabapentin 200mg BID, Tylenol 975mg Q8 hours, Voltaren gel four times a day for left shoulder, Dilaudid 0 5mg IV Q4 hours PRN (zero), Oxycodone 5mg Q4 hours PRN (2 doses), robaxin 250mg Q6 hours PRN (zero)   Bowel Regimen: Miralax daily, Senna-S at HS, Dulcolax PRN, fleet PRN       Meds/Allergies   all current active meds have been reviewed and current meds:   Current Facility-Administered Medications   Medication Dose Route Frequency    acetaminophen (TYLENOL) tablet 975 mg  975 mg Oral Q8H [facility]    albuterol (PROVENTIL HFA,VENTOLIN HFA) inhaler 1 puff  1 puff Inhalation Q4H PRN    amiodarone tablet 200 mg  200 mg Oral BID    amLODIPine (NORVASC) tablet 10 mg  10 mg Oral Daily    atorvastatin (LIPITOR) tablet 80 mg  80 mg Oral Daily    bisacodyl (DULCOLAX) EC tablet 5 mg  5 mg Oral Daily PRN    bisacodyl (DULCOLAX) rectal suppository 10 mg  10 mg Rectal Daily PRN    cefepime (MAXIPIME) 2,000 mg in dextrose 5 % 50 mL IVPB  2,000 mg Intravenous Q12H    cholecalciferol (VITAMIN D3) tablet 1,000 Units  1,000 Units Oral Daily    diclofenac sodium (VOLTAREN) 1 % topical gel 2 g  2 g Topical 4x Daily    fentaNYL (DURAGESIC) 12 mcg/hr TD 72 hr patch 12 mcg  12 mcg Transdermal Q72H    fluticasone (FLOVENT HFA) 110 MCG/ACT inhaler 1 puff  1 puff Inhalation Q12H LANCE    furosemide (LASIX) tablet 40 mg  40 mg Oral Daily    gabapentin (NEURONTIN) capsule 200 mg  200 mg Oral BID    heparin (porcine) 25,000 units in 250 mL infusion (premix)  3-20 Units/kg/hr (Order-Specific) Intravenous Titrated    heparin (porcine) injection 2,000 Units  2,000 Units Intravenous PRN    heparin (porcine) injection 4,000 Units  4,000 Units Intravenous PRN    HYDROmorphone (DILAUDID) injection 0 5 mg  0 5 mg Intravenous Q4H PRN    insulin glargine (LANTUS) subcutaneous injection 14 Units 0 14 mL  14 Units Subcutaneous BID    insulin lispro (HumaLOG) 100 units/mL subcutaneous injection 1-5 Units  1-5 Units Subcutaneous TID AC    insulin lispro (HumaLOG) 100 units/mL subcutaneous injection 1-5 Units  1-5 Units Subcutaneous HS    labetalol (NORMODYNE) injection 5 mg  5 mg Intravenous Q6H PRN    levETIRAcetam (KEPPRA) tablet 750 mg  750 mg Oral Daily    levothyroxine tablet 100 mcg  100 mcg Oral Early Morning    magnesium oxide (MAG-OX) tablet 400 mg  400 mg Oral BID    methocarbamol (ROBAXIN) tablet 250 mg  250 mg Oral Q6H PRN    metroNIDAZOLE (FLAGYL) tablet 500 mg  500 mg Oral Q8H [address]    oxyCODONE (ROXICODONE) IR tablet 5 mg  5 mg Oral Q4H PRN    polyethylene glycol (MIRALAX) packet 17 g  17 g Oral Daily    prochlorperazine (COMPAZINE) tablet 5 mg  5 mg Oral Q6H PRN    senna-docusate sodium (SENOKOT S) 8 6-50 mg per tablet 1 tablet  1 tablet Oral HS    sodium chloride 0 9 % infusion  75 mL/hr Intravenous Continuous    sodium phosphate-biphosphate (FLEET) enema 1 enema  1 enema Rectal Once PRN       Allergies   Allergen Reactions    Other Chest Pain     IVP-listed as "chest pain" in previous chart, patient stated this occurred "a long time ago" but does not remember exactly occurred     Contrast [Iodinated Diagnostic Agents] Other (See Comments)     Flash pulm edema    Doxycycline Chest Pain    Ketorolac Other (See Comments)     Chest pain     Levaquin [Levofloxacin] Chest Pain    Ondansetron Chest Pain     Prolonged QT    Toradol [Ketorolac Tromethamine] Chest Pain       Objective     Temp:  [97 6 °F (36 4 °C)-98 1 °F (36 7 °C)] 97 6 °F (36 4 °C)  HR:  [61-66] 61  Resp:  [18] 18  BP: (139-163)/(64-71) 139/64      Intake/Output Summary (Last 24 hours) at 09/17/18 0955  Last data filed at 09/17/18 0612   Gross per 24 hour   Intake           704 56 ml   Output             3375 ml   Net         -2670 44 ml               Physical Exam: /64   Pulse 61   Temp 97 6 °F (36 4 °C)   Resp 18   Ht 5' 4" (1 626 m)   Wt 68 9 kg (151 lb 14 4 oz)   LMP  (LMP Unknown)   SpO2 95%   BMI 26 07 kg/m²   General Appearance:    Awake and Alert, NAD   Neurological:   Oriented, quiet   Head:    Normocephalic, without obvious abnormality, atraumatic   Eyes:    EOM intact    Lungs:     Respirations unlabored   Chest Wall:    No deformity   Abdomen:        Round   Extremities:   B/L LE mild edema present, + reis    Skin:   Skin color pale     Lab Results:     Results from last 7 days  Lab Units 09/17/18  0505   WBC Thousand/uL 10 61*   HEMOGLOBIN g/dL 10 7*   HEMATOCRIT % 34 4*   PLATELETS Thousands/uL 282      Results from last 7 days  Lab Units 09/17/18  0505  09/12/18  1003 SODIUM mmol/L 138  < >  --    POTASSIUM mmol/L 3 7  < >  --    CHLORIDE mmol/L 105  < >  --    CO2 mmol/L 28  < >  --    BUN mg/dL 10  < >  --    CREATININE mg/dL 0 62  < >  --    CALCIUM mg/dL 8 2*  < >  --    GLUCOSE, ISTAT mg/dl  --   --  104   < > = values in this interval not displayed  Counseling / Coordination of Care  Total floor / unit time spent today 15 minutes  Greater than 50% of total time was spent with the patient and / or family counseling and / or coordination of care   A description of the counseling / coordination of care: Reviewed plan of care and medications with patient, RN staff and primary care team     Josie Gomez MS, RN-BC  Acute Pain

## 2018-09-17 NOTE — MEDICAL STUDENT
MEDICAL STUDENT  Inpatient Progress Note for TRAINING ONLY  Not Part of Legal Medical Record       Progress Note - Natalie Carrillo [de-identified] y o  female MRN: [id number]    Unit/Bed#: [facility] 527-01 Encounter: [id number]      Assessment:  [de-identified] y/o F with significant vascular comorbidities presented with dry gangrene of the L first toe  Going for L BKA today  Plan:  Dry gangrene of L 1st toe 2/2 to peripheral vascular disease  - Patient continues to have significant pain in L lower extremity due to dry gangrene of first toe  She has finally agreed to go to the OR for a L BKA  - F/U podiatry and vascular surgery  Anemia  - Patient had a Hb of 6 4 on Friday 2/2 to bleeding  2 units of blood were transfused  Patient's Hb is improved today to 10 7  DVT/PE history  - Patient is anticoagulated with heparin  CAD s/p CABG  - Continue atorvastatin/aspirin  HTN  - Continue amlodipine  Paroxysmal afib  - Continue amiodarone and anticoagulation  Type 2 DM  - Patient is on 28 units of Lantus and sliding scale algorithm 2 for insulin  Patient's glucose has been around 84, relatively low for this patient  Decrease the amount of insulin used  Subjective:   Patient has had no acute events overnight  She continues to complain about pain in her feet, 10/10  She also complains that she has had some nausea  She denies SOB, chest pain, vomiting, F/Cs, abdominal pain, and headaches  Objective:     Vitals: Blood pressure 139/64, pulse 61, temperature 97 6 °F (36 4 °C), resp  rate 18, height 5' 4" (1 626 m), weight 68 9 kg (151 lb 14 4 oz), SpO2 95 %, not currently breastfeeding  ,Body mass index is 26 07 kg/m²  Intake/Output Summary (Last 24 hours) at 09/17/18 0904  Last data filed at 09/17/18 6345   Gross per 24 hour   Intake           824 56 ml   Output             3375 ml   Net         -2550 44 ml       Physical Exam   Constitutional: She appears well-developed and well-nourished  She appears lethargic     HENT:   Head: Normocephalic and atraumatic  Cardiovascular: Normal rate, regular rhythm and normal heart sounds  Pulmonary/Chest: Effort normal and breath sounds normal    Abdominal: Soft  Bowel sounds are normal  She exhibits no distension and no mass  There is no tenderness  There is no rebound and no guarding  Neurological: She appears lethargic         Invasive Devices     Peripheral Intravenous Line            Peripheral IV 09/15/18 Left Hand 2 days    Peripheral IV 09/16/18 Right Forearm 1 day          Drain            Urethral Catheter Non-latex;Straight-tip 3 days                Lab, Imaging and other studies:    Recent Results (from the past 24 hour(s))   Fingerstick Glucose (POCT)    Collection Time: 09/16/18 11:04 AM   Result Value Ref Range    POC Glucose 203 (H) 65 - 140 mg/dl   TSH, 3rd generation    Collection Time: 09/16/18 12:39 PM   Result Value Ref Range    TSH 3RD GENERATON 2 970 0 358 - 3 740 uIU/mL   APTT    Collection Time: 09/16/18 12:39 PM   Result Value Ref Range    PTT 87 (H) 24 - 36 seconds   Fingerstick Glucose (POCT)    Collection Time: 09/16/18  4:28 PM   Result Value Ref Range    POC Glucose 152 (H) 65 - 140 mg/dl   Fingerstick Glucose (POCT)    Collection Time: 09/16/18  9:21 PM   Result Value Ref Range    POC Glucose 145 (H) 65 - 140 mg/dl   CBC and differential    Collection Time: 09/17/18  5:05 AM   Result Value Ref Range    WBC 10 61 (H) 4 31 - 10 16 Thousand/uL    RBC 4 10 3 81 - 5 12 Million/uL    Hemoglobin 10 7 (L) 11 5 - 15 4 g/dL    Hematocrit 34 4 (L) 34 8 - 46 1 %    MCV 84 82 - 98 fL    MCH 26 1 (L) 26 8 - 34 3 pg    MCHC 31 1 (L) 31 4 - 37 4 g/dL    RDW 20 1 (H) 11 6 - 15 1 %    MPV 10 5 8 9 - 12 7 fL    Platelets 506 355 - 222 Thousands/uL    nRBC 0 /100 WBCs    Neutrophils Relative 72 43 - 75 %    Immat GRANS % 1 0 - 2 %    Lymphocytes Relative 14 14 - 44 %    Monocytes Relative 11 4 - 12 %    Eosinophils Relative 2 0 - 6 %    Basophils Relative 0 0 - 1 %    Neutrophils Absolute 7 56 1 85 - 7 62 Thousands/µL    Immature Grans Absolute 0 09 0 00 - 0 20 Thousand/uL    Lymphocytes Absolute 1 49 0 60 - 4 47 Thousands/µL    Monocytes Absolute 1 20 0 17 - 1 22 Thousand/µL    Eosinophils Absolute 0 23 0 00 - 0 61 Thousand/µL    Basophils Absolute 0 04 0 00 - 0 10 Thousands/µL   Basic metabolic panel    Collection Time: 09/17/18  5:05 AM   Result Value Ref Range    Sodium 138 136 - 145 mmol/L    Potassium 3 7 3 5 - 5 3 mmol/L    Chloride 105 100 - 108 mmol/L    CO2 28 21 - 32 mmol/L    ANION GAP 5 4 - 13 mmol/L    BUN 10 5 - 25 mg/dL    Creatinine 0 62 0 60 - 1 30 mg/dL    Glucose 86 65 - 140 mg/dL    Calcium 8 2 (L) 8 3 - 10 1 mg/dL    eGFR 85 ml/min/1 73sq m   APTT    Collection Time: 09/17/18  5:05 AM   Result Value Ref Range    PTT 73 (H) 24 - 36 seconds   Fingerstick Glucose (POCT)    Collection Time: 09/17/18  6:44 AM   Result Value Ref Range    POC Glucose 98 65 - 140 mg/dl     VTE Pharmacologic Prophylaxis: Heparin  VTE Mechanical Prophylaxis: reason for no mechanical VTE prophylaxis bilateral dry gangrene of lower extremities

## 2018-09-17 NOTE — ASSESSMENT & PLAN NOTE
Severe PAD with Left foot gangrene    ~S/P Left Plantar artery exploration (Vessel not suitable as target for bypass) 9/12     Plan:  --Plan for Left BKA today  --NPO  --Xarelto held (Afib/flutter)  --Pain mgt; APS following  --Continue Abx   --Will need PT/OT and rehab planning post-op  --Case Management for dispo planning

## 2018-09-17 NOTE — PROGRESS NOTES
[facility] - Internal Medicine Progress Note      PATIENT INFORMATION      Patient: Collins Mae [de-identified] y o  female   MRN: [id number]  PCP: Oziel Andersen MD  Unit/Bed#: [facility] 527-01 Encounter: [id number]  Date Of Visit: 18  Current Length of Stay: 10 day(s)     ASSESSMENTS & PLAN           1   Severe peripheral vascular disease  Involving both feet but mainly left great toe   Dry gangrene with concern for wet gangrene of left great toe      · Plan per Podiatry/Vascular  · Wound culture of little utility, likely colonization   Previously evaluated by Infectious Disease  · Analgesia:  Plan per Acute Pain Service     2   History of DVT/PE  · Heparin gtt  · Hold SCDs in setting of chronic lower extremity wounds     3   Coronary artery disease status post CABG  · Continue atorvastatin/aspirin     4   Paroxysmal AFib  · Continue amiodarone 20 mg b i d  rate control  · Anticoagulation as above     5   Chronic systolic heart failure (without exacerbation)  · Cont Lasix         6   Elevated INR secondary to her Xarelto  · Anticoagulation as above     7   Hypertension  · Continue Amlodipine  · Lisinopril held  · IV labetalol 5 mg q 6 hours p r n      8   Acute kidney injury  · Resolved     9   Type 2 diabetes mellitus  · Lantus to 14 units BID and sliding scale insulin      VTE Pharmacologic Prophylaxis: Heparin   VTE Mechanical Prophylaxis: SCD's      SUBJECTIVE     No significant overnight events  Patient continues to complain about pain in her feet  She also states the room is too cold and would like an extra blanket  Patient denies chest pain, palpitations, SOB, cough, abdominal pain, nausea, vomiting, constipation, diarrhea, fevers/chills, headaches, dysuria  OBJECTIVE     Vitals:   Temp (24hrs), Av 9 °F (36 6 °C), Min:97 6 °F (36 4 °C), Max:98 1 °F (36 7 °C)    HR:  [61-66] 61  Resp:  [18] 18  BP: (139-163)/(64-71) 139/64  SpO2:  [92 %-96 %] 95 %  Body mass index is 26 07 kg/m²       Input and Output Summary (last 24 hours): Intake/Output Summary (Last 24 hours) at 09/17/18 0835  Last data filed at 09/17/18 0612   Gross per 24 hour   Intake           824 56 ml   Output             3375 ml   Net         -2550 44 ml       Physical Exam:   GENERAL: NAD  HEENT:  NC/AT, PERRL, EOMI, MMM, no scleral icterus  CARDIAC:  RRR, +S1/S2, no S3/S4 heard, no m/g/r  PULMONARY:  CTA B/L, no wheezing/rales/rhonci, non-labored breathing  ABDOMEN:  Soft, NT/ND, +BS, no rebound/guarding/rigidity  Extremities:  No changes        ADDITIONAL DATA     Labs & Recent Cultures:       Results from last 7 days  Lab Units 09/17/18  0505   WBC Thousand/uL 10 61*   HEMOGLOBIN g/dL 10 7*   HEMATOCRIT % 34 4*   PLATELETS Thousands/uL 282   NEUTROS PCT % 72   LYMPHS PCT % 14   MONOS PCT % 11   EOS PCT % 2       Results from last 7 days  Lab Units 09/17/18  0505  09/12/18  1003   SODIUM mmol/L 138  < >  --    POTASSIUM mmol/L 3 7  < >  --    CHLORIDE mmol/L 105  < >  --    CO2 mmol/L 28  < >  --    BUN mg/dL 10  < >  --    CREATININE mg/dL 0 62  < >  --    CALCIUM mg/dL 8 2*  < >  --    GLUCOSE, ISTAT mg/dl  --   --  104   < > = values in this interval not displayed              Results from last 7 days  Lab Units 09/11/18  0845   GRAM STAIN RESULT  No polys seen  3+ Gram negative rods  1+ Gram positive cocci in pairs   WOUND CULTURE  4+ Growth of Pseudomonas aeruginosa*  4+ Growth of Klebsiella oxytoca*  4+ Growth of Staphylococcus aureus*         Last 24 Hours Medication List:     Current Facility-Administered Medications:  acetaminophen 975 mg Oral Q8H [address] Nasreen Montenegro PA-C    albuterol 1 puff Inhalation Q4H PRN Bradly Gray MD    amiodarone 200 mg Oral BID Bradly Gray MD    amLODIPine 10 mg Oral Daily Bradly Gray MD    atorvastatin 80 mg Oral Daily Bradly Gray MD    bisacodyl 5 mg Oral Daily PRN Davis Kinsey MD    bisacodyl 10 mg Rectal Daily PRN Hafsa Montenegro PA-C    cefepime 2,000 mg Intravenous Q12H Dora raines Iftikhar Trujillo MD Last Rate: 2,000 mg (09/17/18 0457)   cholecalciferol 1,000 Units Oral Daily Rosio Zeng MD    diclofenac sodium 2 g Topical 4x Daily Davis Kinsey MD    fentaNYL 12 mcg Transdermal Q72H Nasreen Montenegro PA-C    fluticasone 1 puff Inhalation Q12H [address] Rosio Zeng MD    furosemide 40 mg Oral Daily Davis Kinsey MD    gabapentin 200 mg Oral BID Nasreen Montenegro PA-C    heparin (porcine) 3-20 Units/kg/hr (Order-Specific) Intravenous Titrated Carlton Kinsey MD Last Rate: 18 Units/kg/hr (09/17/18 0635)   heparin (porcine) 2,000 Units Intravenous PRN Carlton Kinsey MD    heparin (porcine) 4,000 Units Intravenous PRN Carlton Kinsey MD    HYDROmorphone 0 5 mg Intravenous Q4H PRN Nasreen Montenegro PA-C    insulin glargine 14 Units Subcutaneous BID Davis Kinsey MD    insulin lispro 1-5 Units Subcutaneous TID AC Berneice Handler, DO    insulin lispro 1-5 Units Subcutaneous HS Berneice Handler, DO    labetalol 5 mg Intravenous Q6H PRN Jordan Sanchezr, DO    levETIRAcetam 750 mg Oral Daily Rosio Zeng MD    levothyroxine 100 mcg Oral Early Morning Rosio Zeng MD    magnesium oxide 400 mg Oral BID Rosio Zeng MD    methocarbamol 250 mg Oral Q6H PRN Nasreen Montenegro PA-C    metroNIDAZOLE 500 mg Oral [facility] Mehdi Ugalde, DPM    oxyCODONE 5 mg Oral Q4H PRN Davis Kinsey MD    polyethylene glycol 17 g Oral Daily Davis Kinsey MD    prochlorperazine 5 mg Oral Q6H PRN Kimberley Kendrick MD    senna-docusate sodium 1 tablet Oral HS Davis Kinsey MD    sodium chloride 75 mL/hr Intravenous Continuous Jose Miguel Rosales PA-C Last Rate: Stopped (09/14/18 0846)   sodium phosphate-biphosphate 1 enema Rectal Once PRN Jose Miguel Rosales PA-C           Time Spent for Care: 30 mins spent in total   More than 50% of total time spent on counseling and coordination of care as described above        Current Length of Stay: 10 day(s)      Code Status: Level 3 - DNAR and DNI Azeb Choudhary    ** Please Note: This note is constructed using a voice recognition dictation system   **

## 2018-09-17 NOTE — PROGRESS NOTES
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 [facility])   Assessment & Plan    Severe PAD with Left foot gangrene    ~S/P Left Plantar artery exploration (Vessel not suitable as target for bypass) 9/12     Plan:  --Plan for Left BKA today  --NPO  --Xarelto held (Afib/flutter)  --Pain mgt; APS following  --Continue Abx   --Will need PT/OT and rehab planning post-op  --Case Management for dispo planning            Subjective:  Pt c/o Left foot pain    Vitals:  /71 (BP Location: Left arm)   Pulse 66   Temp 98 °F (36 7 °C) (Oral)   Resp 18   Ht 5' 4" (1 626 m)   Wt 68 9 kg (151 lb 14 4 oz)   LMP  (LMP Unknown)   SpO2 96%   BMI 26 07 kg/m²     I/Os:  I/O last 3 completed shifts: In: 1484 3 [P O :880; I V :454 3; IV Piggyback:150]  Out: 4000 [DXFDQ:1459]  I/O this shift:  In: 150 8 [I V :150 8]  Out: 1175 [Urine:1175]    Lab Results and Cultures:   Lab Results   Component Value Date    WBC 11 95 (H) 09/16/2018    HGB 10 7 (L) 09/16/2018    HCT 33 8 (L) 09/16/2018    MCV 83 09/16/2018     09/16/2018     Lab Results   Component Value Date    GLUCOSE 104 09/12/2018    CALCIUM 8 2 (L) 09/17/2018     09/17/2018    K 3 7 09/17/2018    CO2 28 09/17/2018     09/17/2018    BUN 10 09/17/2018    CREATININE 0 62 09/17/2018     Lab Results   Component Value Date    INR 1 52 (H) 09/09/2018    INR 1 77 (H) 09/08/2018    INR 2 26 (H) 09/07/2018    PROTIME 18 4 (H) 09/09/2018    PROTIME 20 7 (H) 09/08/2018    PROTIME 25 0 (H) 09/07/2018        Blood Culture:   Lab Results   Component Value Date    BLOODCX No Growth After 5 Days  08/08/2018    BLOODCX No Growth After 5 Days   08/08/2018   ,   Urinalysis:   Lab Results   Component Value Date    COLORU Yellow 05/18/2018    COLORU Yellow 12/21/2016    CLARITYU Clear 05/18/2018    CLARITYU Transparent 12/21/2016    SPECGRAV 1 010 05/18/2018    SPECGRAV 1 010 12/21/2016    PHUR 5 0 05/18/2018    PHUR 8 12/21/2016    LEUKOCYTESUR Small (A) 05/18/2018    LEUKOCYTESUR - 12/21/2016    NITRITE neg 06/12/2018    NITRITE Negative 05/18/2018    NITRITE - 12/21/2016    PROTEINUA Negative 05/18/2018    PROTEINUA - 12/21/2016    GLUCOSEU neg 06/12/2018    GLUCOSEU Negative 05/18/2018    GLUCOSEU Negative 05/28/2015    KETONESU neg 06/12/2018    KETONESU Negative 05/18/2018    KETONESU - 12/21/2016    BILIRUBINUR Negative 05/18/2018    BILIRUBINUR Negative 05/28/2015    BLOODU Negative 05/18/2018    BLOODU - 12/21/2016   ,   Urine Culture:   Lab Results   Component Value Date    URINECX >100,000 cfu/ml Proteus mirabilis (A) 06/12/2018   ,   Wound Culure:   Lab Results   Component Value Date    WOUNDCULT 4+ Growth of Pseudomonas aeruginosa (A) 09/11/2018    WOUNDCULT 4+ Growth of Klebsiella oxytoca (A) 09/11/2018    WOUNDCULT 4+ Growth of Staphylococcus aureus (A) 09/11/2018       Medications:  Current Facility-Administered Medications   Medication Dose Route Frequency    acetaminophen (TYLENOL) tablet 975 mg  975 mg Oral Q8H [address]    albuterol (PROVENTIL HFA,VENTOLIN HFA) inhaler 1 puff  1 puff Inhalation Q4H PRN    amiodarone tablet 200 mg  200 mg Oral BID    amLODIPine (NORVASC) tablet 10 mg  10 mg Oral Daily    atorvastatin (LIPITOR) tablet 80 mg  80 mg Oral Daily    bisacodyl (DULCOLAX) EC tablet 5 mg  5 mg Oral Daily PRN    bisacodyl (DULCOLAX) rectal suppository 10 mg  10 mg Rectal Daily PRN    cefepime (MAXIPIME) 2,000 mg in dextrose 5 % 50 mL IVPB  2,000 mg Intravenous Q12H    cholecalciferol (VITAMIN D3) tablet 1,000 Units  1,000 Units Oral Daily    diclofenac sodium (VOLTAREN) 1 % topical gel 2 g  2 g Topical 4x Daily    fentaNYL (DURAGESIC) 12 mcg/hr TD 72 hr patch 12 mcg  12 mcg Transdermal Q72H    fluticasone (FLOVENT HFA) 110 MCG/ACT inhaler 1 puff  1 puff Inhalation Q12H LANCE    furosemide (LASIX) tablet 40 mg  40 mg Oral Daily    gabapentin (NEURONTIN) capsule 200 mg  200 mg Oral BID    heparin (porcine) 25,000 units in 250 mL infusion (premix)  3-20 Units/kg/hr (Order-Specific) Intravenous Titrated    heparin (porcine) injection 2,000 Units  2,000 Units Intravenous PRN    heparin (porcine) injection 4,000 Units  4,000 Units Intravenous PRN    HYDROmorphone (DILAUDID) injection 0 5 mg  0 5 mg Intravenous Q4H PRN    insulin glargine (LANTUS) subcutaneous injection 14 Units 0 14 mL  14 Units Subcutaneous BID    insulin lispro (HumaLOG) 100 units/mL subcutaneous injection 1-5 Units  1-5 Units Subcutaneous TID AC    insulin lispro (HumaLOG) 100 units/mL subcutaneous injection 1-5 Units  1-5 Units Subcutaneous HS    labetalol (NORMODYNE) injection 5 mg  5 mg Intravenous Q6H PRN    levETIRAcetam (KEPPRA) tablet 750 mg  750 mg Oral Daily    levothyroxine tablet 100 mcg  100 mcg Oral Early Morning    magnesium oxide (MAG-OX) tablet 400 mg  400 mg Oral BID    methocarbamol (ROBAXIN) tablet 250 mg  250 mg Oral Q6H PRN    metroNIDAZOLE (FLAGYL) tablet 500 mg  500 mg Oral Q8H [address]    oxyCODONE (ROXICODONE) IR tablet 5 mg  5 mg Oral Q4H PRN    polyethylene glycol (MIRALAX) packet 17 g  17 g Oral Daily    prochlorperazine (COMPAZINE) tablet 5 mg  5 mg Oral Q6H PRN    senna-docusate sodium (SENOKOT S) 8 6-50 mg per tablet 1 tablet  1 tablet Oral HS    sodium chloride 0 9 % infusion  75 mL/hr Intravenous Continuous    sodium phosphate-biphosphate (FLEET) enema 1 enema  1 enema Rectal Once PRN       Physical Exam:    General appearance: alert and oriented, in no acute distress  Lungs: clear to auscultation bilaterally  Heart: regular rate and rhythm, S1, S2 normal, no murmur, click, rub or gallop  Abdomen: soft, non-tender; bowel sounds normal; no masses,  no organomegaly  Extremities: Left hallux dry gangrene, Left foot incision C/D/I, +M/S  Right foot wound dry, M/S intact, foot warm        Nayely Moore PA-C  9/17/2018

## 2018-09-17 NOTE — CASE MANAGEMENT
Continued Stay Review    Date:   9/15/18 Saturday ACUTE MED SURG LEVEL OF CARE    Vital Signs:  Blood pressure 152/67, pulse 67, temperature 98 6 °F (37 °C), temperature source Oral, resp  rate 18, height 5' 4" (1 626 m), weight 71 7 kg (158 lb 1 1 oz), SpO2 91 %, not currently breastfeeding  ,Body mass index is 27 13 kg/m²         Intake/Output Summary (Last 24 hours) at 09/15/18 0558    Gross per 24 hour   Intake          1917 76 ml   Output             1700 ml   Net           217 76 ml       Diet Cardiac; Sodium 4 Gm (RUPERT);  Consistent Carbohydrate Diet Level 2 (5 carb servings/75 grams CHO/meal)    Dietary nutrition supplements Glucerna TID with Meals      Continuous IV Infusions:   heparin (porcine) 3-20 Units/kg/hr (Order-Specific) Last Rate: Stopped    sodium chloride 75 mL/hr Last Rate: Stopped (09/14/18 0846)       Medications:   Scheduled Meds:   Current Facility-Administered Medications:  acetaminophen 975 mg Oral Q8H [address] Nasreen Montenegro PA-C    albuterol 1 puff Inhalation Q4H PRN Darling Mcbride MD    amiodarone 200 mg Oral BID Darling Mcbride MD    amLODIPine 10 mg Oral Daily Darling Mcbride MD    atorvastatin 80 mg Oral Daily Darling Mcbride MD    bisacodyl 5 mg Oral Daily PRN Davis Kinsey MD    bisacodyl 10 mg Rectal Daily PRN Rama Carpenter PA-C    cefepime 2,000 mg Intravenous Q12H Chepe Baig MD Last Rate: 2,000 mg (09/17/18 0457)   cholecalciferol 1,000 Units Oral Daily Darling Mcbride MD    diclofenac sodium 2 g Topical 4x Daily Davis Kinsey MD    fluticasone 1 puff Inhalation Q12H [address] Darling Mcbride MD    furosemide 40 mg Oral Daily Davis Kinsey MD    gabapentin 200 mg Oral BID Nasreen Montenegro PA-C    heparin (porcine) 3-20 Units/kg/hr (Order-Specific) Intravenous Titrated Carlton Kinsey MD Last Rate: Stopped (09/17/18 1204)   heparin (porcine) 2,000 Units Intravenous PRN Carlton Kinsey MD    heparin (porcine) 4,000 Units Intravenous PRN Carlton Kinsey MD    HYDROmorphone 0 5 mg Intravenous Q4H PRN Dyllan Montenegro PA-C    insulin glargine 12 Units Subcutaneous BID Davis Kinsey MD    insulin lispro 1-5 Units Subcutaneous TID AC José Miguel Brazil, DO    insulin lispro 1-5 Units Subcutaneous HS José Miguel Brazil, DO    labetalol 5 mg Intravenous Q6H PRN José Miguel Brazil, DO    levETIRAcetam 750 mg Oral Daily Zeus Lemus MD    levothyroxine 100 mcg Oral Early Morning Zeus Lemus MD    magnesium oxide 400 mg Oral BID Zeus Lemus MD    methocarbamol 250 mg Oral Q6H PRN Dyllan Montenegro PA-C    metroNIDAZOLE 500 mg Oral [facility] Jose Vance DPM    oxyCODONE 5 mg Oral Q4H PRN Davis Kinsey MD    polyethylene glycol 17 g Oral Daily Davis Kinsey MD    prochlorperazine 5 mg Oral Q6H PRN Johnie Cody MD    senna-docusate sodium 1 tablet Oral HS Davis Kinsey MD    sodium chloride 75 mL/hr Intravenous Continuous DINA VasquezC Last Rate: Stopped (09/14/18 0846)   sodium phosphate-biphosphate 1 enema Rectal Once PRN DINA VasquezC        PRN Meds:     albuterol    bisacodyl    heparin (porcine)    heparin (porcine)    HYDROmorphone    labetalol    methocarbamol    oxyCODONE 5 mg po q4hrs prn given x 3/ 24 hrs    Prochlorperazine 5 mg po q6hrs prn given x 1/ 24 hrs    sodium phosphate-biphosphate      LABSDiagnostic Results:  Lab Results   Component Value Date     HGB 10 2 (L) 09/14/2018     HCT 32 6 (L) 09/14/2018       Age/Sex: [de-identified] y o  female       Assessment/Plan:   Assessment:  [de-identified] y/o F w/ PAD who p/w L 1st toe wet gangrene, L 2nd toe eschar, R 3rd toe dry gangrene, s/p L plantar artery exploration, POD #3     Plan:  --Planning for L BKA; pt still undecided  --Pain control  --Local wound care per Podiatry  --Continue IV Abx  --Continue Heparin gtt  --Cardiac/CCD 2 Diet          Discharge Plan:   TO BE DETERMINED    CASE MANAGEMENT FOLLOWING CLOSELY FOR ALL DISCHARGE NEEDS

## 2018-09-17 NOTE — PLAN OF CARE
DISCHARGE PLANNING - CARE MANAGEMENT     Discharge to post-acute care or home with appropriate resources Progressing        METABOLIC, FLUID AND ELECTROLYTES - ADULT     Fluid balance maintained Progressing     Glucose maintained within target range Progressing        Nutrition/Hydration-ADULT     Nutrient/Hydration intake appropriate for improving, restoring or maintaining nutritional needs Progressing        Potential for Falls     Patient will remain free of falls Progressing        Prexisting or High Potential for Compromised Skin Integrity     Skin integrity is maintained or improved Progressing        SKIN/TISSUE INTEGRITY - ADULT     Skin integrity remains intact Progressing     Incision(s), wounds(s) or drain site(s) healing without S/S of infection Progressing     Oral mucous membranes remain intact Progressing

## 2018-09-18 ENCOUNTER — APPOINTMENT (OUTPATIENT)
Dept: WOUND CARE | Facility: HOSPITAL | Age: 78
DRG: 239 | End: 2018-09-18
Payer: MEDICARE

## 2018-09-18 LAB
ANION GAP SERPL CALCULATED.3IONS-SCNC: 7 MMOL/L (ref 4–13)
APTT PPP: 120 SECONDS (ref 24–36)
APTT PPP: 37 SECONDS (ref 24–36)
BUN SERPL-MCNC: 18 MG/DL (ref 5–25)
CALCIUM SERPL-MCNC: 8 MG/DL (ref 8.3–10.1)
CHLORIDE SERPL-SCNC: 101 MMOL/L (ref 100–108)
CO2 SERPL-SCNC: 26 MMOL/L (ref 21–32)
CREAT SERPL-MCNC: 0.88 MG/DL (ref 0.6–1.3)
ERYTHROCYTE [DISTWIDTH] IN BLOOD BY AUTOMATED COUNT: 20.5 % (ref 11.6–15.1)
GFR SERPL CREATININE-BSD FRML MDRD: 62 ML/MIN/1.73SQ M
GLUCOSE SERPL-MCNC: 159 MG/DL (ref 65–140)
GLUCOSE SERPL-MCNC: 168 MG/DL (ref 65–140)
GLUCOSE SERPL-MCNC: 174 MG/DL (ref 65–140)
GLUCOSE SERPL-MCNC: 180 MG/DL (ref 65–140)
GLUCOSE SERPL-MCNC: 191 MG/DL (ref 65–140)
GLUCOSE SERPL-MCNC: 198 MG/DL (ref 65–140)
GLUCOSE SERPL-MCNC: 204 MG/DL (ref 65–140)
HCT VFR BLD AUTO: 29.5 % (ref 34.8–46.1)
HGB BLD-MCNC: 9.3 G/DL (ref 11.5–15.4)
INR PPP: 1.28 (ref 0.86–1.17)
MCH RBC QN AUTO: 26.3 PG (ref 26.8–34.3)
MCHC RBC AUTO-ENTMCNC: 31.5 G/DL (ref 31.4–37.4)
MCV RBC AUTO: 84 FL (ref 82–98)
PLATELET # BLD AUTO: 271 THOUSANDS/UL (ref 149–390)
PMV BLD AUTO: 9.6 FL (ref 8.9–12.7)
POTASSIUM SERPL-SCNC: 4.2 MMOL/L (ref 3.5–5.3)
PROTHROMBIN TIME: 16.1 SECONDS (ref 11.8–14.2)
RBC # BLD AUTO: 3.53 MILLION/UL (ref 3.81–5.12)
SODIUM SERPL-SCNC: 134 MMOL/L (ref 136–145)
WBC # BLD AUTO: 11.31 THOUSAND/UL (ref 4.31–10.16)

## 2018-09-18 PROCEDURE — 99024 POSTOP FOLLOW-UP VISIT: CPT | Performed by: SURGERY

## 2018-09-18 PROCEDURE — G0277 HBOT, FULL BODY CHAMBER, 30M: HCPCS

## 2018-09-18 PROCEDURE — 85610 PROTHROMBIN TIME: CPT | Performed by: PHYSICIAN ASSISTANT

## 2018-09-18 PROCEDURE — 80048 BASIC METABOLIC PNL TOTAL CA: CPT | Performed by: INTERNAL MEDICINE

## 2018-09-18 PROCEDURE — 85027 COMPLETE CBC AUTOMATED: CPT | Performed by: INTERNAL MEDICINE

## 2018-09-18 PROCEDURE — 85730 THROMBOPLASTIN TIME PARTIAL: CPT | Performed by: SURGERY

## 2018-09-18 PROCEDURE — 82948 REAGENT STRIP/BLOOD GLUCOSE: CPT

## 2018-09-18 PROCEDURE — 99233 SBSQ HOSP IP/OBS HIGH 50: CPT | Performed by: INTERNAL MEDICINE

## 2018-09-18 PROCEDURE — 85730 THROMBOPLASTIN TIME PARTIAL: CPT | Performed by: PHYSICIAN ASSISTANT

## 2018-09-18 RX ORDER — SODIUM CHLORIDE 450 MG/100ML
100 INJECTION, SOLUTION INTRAVENOUS CONTINUOUS
Status: DISPENSED | OUTPATIENT
Start: 2018-09-18 | End: 2018-09-18

## 2018-09-18 RX ORDER — HEPARIN SODIUM 1000 [USP'U]/ML
5200 INJECTION, SOLUTION INTRAVENOUS; SUBCUTANEOUS AS NEEDED
Status: DISCONTINUED | OUTPATIENT
Start: 2018-09-18 | End: 2018-09-19

## 2018-09-18 RX ORDER — HEPARIN SODIUM 1000 [USP'U]/ML
2600 INJECTION, SOLUTION INTRAVENOUS; SUBCUTANEOUS AS NEEDED
Status: DISCONTINUED | OUTPATIENT
Start: 2018-09-18 | End: 2018-09-19

## 2018-09-18 RX ORDER — HEPARIN SODIUM 10000 [USP'U]/100ML
3-30 INJECTION, SOLUTION INTRAVENOUS
Status: DISCONTINUED | OUTPATIENT
Start: 2018-09-18 | End: 2018-09-19

## 2018-09-18 RX ADMIN — SODIUM CHLORIDE, SODIUM LACTATE, POTASSIUM CHLORIDE, AND CALCIUM CHLORIDE 75 ML/HR: .6; .31; .03; .02 INJECTION, SOLUTION INTRAVENOUS at 04:04

## 2018-09-18 RX ADMIN — MAGNESIUM OXIDE TAB 400 MG (241.3 MG ELEMENTAL MG) 400 MG: 400 (241.3 MG) TAB at 08:12

## 2018-09-18 RX ADMIN — OXYCODONE HYDROCHLORIDE 5 MG: 5 TABLET ORAL at 18:58

## 2018-09-18 RX ADMIN — INSULIN LISPRO 1 UNITS: 100 INJECTION, SOLUTION INTRAVENOUS; SUBCUTANEOUS at 21:00

## 2018-09-18 RX ADMIN — METRONIDAZOLE 500 MG: 500 TABLET ORAL at 08:14

## 2018-09-18 RX ADMIN — INSULIN LISPRO 1 UNITS: 100 INJECTION, SOLUTION INTRAVENOUS; SUBCUTANEOUS at 08:15

## 2018-09-18 RX ADMIN — ACETAMINOPHEN 975 MG: 325 TABLET ORAL at 21:00

## 2018-09-18 RX ADMIN — ATORVASTATIN CALCIUM 80 MG: 80 TABLET, FILM COATED ORAL at 17:27

## 2018-09-18 RX ADMIN — VITAMIN D, TAB 1000IU (100/BT) 1000 UNITS: 25 TAB at 08:12

## 2018-09-18 RX ADMIN — FLUTICASONE PROPIONATE 1 PUFF: 110 AEROSOL, METERED RESPIRATORY (INHALATION) at 21:01

## 2018-09-18 RX ADMIN — AMIODARONE HYDROCHLORIDE 200 MG: 200 TABLET ORAL at 08:12

## 2018-09-18 RX ADMIN — MAGNESIUM OXIDE TAB 400 MG (241.3 MG ELEMENTAL MG) 400 MG: 400 (241.3 MG) TAB at 17:27

## 2018-09-18 RX ADMIN — LEVOTHYROXINE SODIUM 100 MCG: 100 TABLET ORAL at 05:14

## 2018-09-18 RX ADMIN — CEFEPIME HYDROCHLORIDE 2000 MG: 2 INJECTION, POWDER, FOR SOLUTION INTRAVENOUS at 04:41

## 2018-09-18 RX ADMIN — FLUTICASONE PROPIONATE 1 PUFF: 110 AEROSOL, METERED RESPIRATORY (INHALATION) at 08:15

## 2018-09-18 RX ADMIN — HEPARIN SODIUM AND DEXTROSE 18 UNITS/KG/HR: 10000; 5 INJECTION INTRAVENOUS at 13:00

## 2018-09-18 RX ADMIN — FUROSEMIDE 40 MG: 40 TABLET ORAL at 08:12

## 2018-09-18 RX ADMIN — LEVETIRACETAM 750 MG: 750 TABLET, FILM COATED ORAL at 08:12

## 2018-09-18 RX ADMIN — INSULIN LISPRO 1 UNITS: 100 INJECTION, SOLUTION INTRAVENOUS; SUBCUTANEOUS at 17:27

## 2018-09-18 RX ADMIN — Medication 1 TABLET: at 21:00

## 2018-09-18 RX ADMIN — OXYCODONE HYDROCHLORIDE 5 MG: 5 TABLET ORAL at 09:37

## 2018-09-18 RX ADMIN — POLYETHYLENE GLYCOL 3350 17 G: 17 POWDER, FOR SOLUTION ORAL at 08:13

## 2018-09-18 RX ADMIN — GABAPENTIN 200 MG: 100 CAPSULE ORAL at 17:27

## 2018-09-18 RX ADMIN — GABAPENTIN 200 MG: 100 CAPSULE ORAL at 08:12

## 2018-09-18 RX ADMIN — DICLOFENAC 2 G: 10 GEL TOPICAL at 12:49

## 2018-09-18 RX ADMIN — OXYCODONE HYDROCHLORIDE 5 MG: 5 TABLET ORAL at 00:24

## 2018-09-18 RX ADMIN — METRONIDAZOLE 500 MG: 500 TABLET ORAL at 17:32

## 2018-09-18 RX ADMIN — DICLOFENAC 2 G: 10 GEL TOPICAL at 08:15

## 2018-09-18 RX ADMIN — SODIUM CHLORIDE 100 ML/HR: 0.45 INJECTION, SOLUTION INTRAVENOUS at 16:37

## 2018-09-18 RX ADMIN — SODIUM CHLORIDE, SODIUM LACTATE, POTASSIUM CHLORIDE, AND CALCIUM CHLORIDE 250 ML: .6; .31; .03; .02 INJECTION, SOLUTION INTRAVENOUS at 02:04

## 2018-09-18 RX ADMIN — AMLODIPINE BESYLATE 10 MG: 10 TABLET ORAL at 08:12

## 2018-09-18 RX ADMIN — INSULIN GLARGINE 12 UNITS: 100 INJECTION, SOLUTION SUBCUTANEOUS at 08:12

## 2018-09-18 RX ADMIN — INSULIN GLARGINE 12 UNITS: 100 INJECTION, SOLUTION SUBCUTANEOUS at 17:27

## 2018-09-18 RX ADMIN — HYDROMORPHONE HYDROCHLORIDE 0.5 MG: 1 INJECTION, SOLUTION INTRAMUSCULAR; INTRAVENOUS; SUBCUTANEOUS at 20:21

## 2018-09-18 RX ADMIN — AMIODARONE HYDROCHLORIDE 200 MG: 200 TABLET ORAL at 17:27

## 2018-09-18 RX ADMIN — ACETAMINOPHEN 975 MG: 325 TABLET ORAL at 05:14

## 2018-09-18 RX ADMIN — HEPARIN SODIUM 5000 UNITS: 5000 INJECTION, SOLUTION INTRAVENOUS; SUBCUTANEOUS at 05:14

## 2018-09-18 RX ADMIN — CEFEPIME HYDROCHLORIDE 2000 MG: 2 INJECTION, POWDER, FOR SOLUTION INTRAVENOUS at 17:27

## 2018-09-18 RX ADMIN — DICLOFENAC 2 G: 10 GEL TOPICAL at 21:01

## 2018-09-18 RX ADMIN — INSULIN LISPRO 1 UNITS: 100 INJECTION, SOLUTION INTRAVENOUS; SUBCUTANEOUS at 12:49

## 2018-09-18 NOTE — PROGRESS NOTES
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 [facility])  Active Problems:    Essential hypertension    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 native heart without angina pectoris    Type 2 diabetes mellitus with complication, with long-term current use of insulin ([facility])    Hyperlipidemia    Gastroesophageal reflux disease without esophagitis    Ulcer of toe of left foot ([facility])    Seizure ([facility])    Anemia    PAD (peripheral artery disease) ([facility])    Elevated INR    Hypothyroidism    Rotator cuff disorder, left    Wet gangrene ([facility])      Plan/Recommendations:   Acute on chronic pain s/p Left BKA  · Acetaminophen 975mg Q8 hours  · Gabapentin 200mg BID  · CrCl 49 65  · Dilaudid 0 5mg IV Q4 hours PRN breakthrough pain  · Oxycodone 5mg Q4 hours PRN severe pain  · Robaxin 250mg Q6 hours PRN LE spasms      Reviewed with Vascular Team    Pain History  Current pain location(s): left foot  Pain Scale:  0-8  Severity:  Severe at times  24 hour history: POD#1 Left BKA  Patient resting in bed, left stump pain is minimal  Overall feels well, awake, alert and oriented  Reports intermittent spikes in pain managed with PRN pain medications  Patient doing well off Fentanyl patch       Current Analgesic regimen:  Gabapentin 200mg BID, Tylenol 975mg Q8 hours, Voltaren gel four times a day for left shoulder, Dilaudid 0 5mg IV Q4 hours PRN (two doses), Oxycodone 5mg Q4 hours PRN (4 doses), robaxin 250mg Q6 hours PRN (zero)   Bowel Regimen: Miralax daily, Senna-S at HS, Dulcolax PRN, fleet PRN       Meds/Allergies   all current active meds have been reviewed and current meds:   Current Facility-Administered Medications   Medication Dose Route Frequency    acetaminophen (TYLENOL) tablet 975 mg  975 mg Oral Q8H [facility]    albuterol (PROVENTIL HFA,VENTOLIN HFA) inhaler 1 puff  1 puff Inhalation Q4H PRN    amiodarone tablet 200 mg  200 mg Oral BID    amLODIPine (NORVASC) tablet 10 mg  10 mg Oral Daily    atorvastatin (LIPITOR) tablet 80 mg  80 mg Oral Daily    bisacodyl (DULCOLAX) EC tablet 5 mg  5 mg Oral Daily PRN    bisacodyl (DULCOLAX) rectal suppository 10 mg  10 mg Rectal Daily PRN    cefepime (MAXIPIME) 2,000 mg in dextrose 5 % 50 mL IVPB  2,000 mg Intravenous Q12H    cholecalciferol (VITAMIN D3) tablet 1,000 Units  1,000 Units Oral Daily    diclofenac sodium (VOLTAREN) 1 % topical gel 2 g  2 g Topical 4x Daily    fluticasone (FLOVENT HFA) 110 MCG/ACT inhaler 1 puff  1 puff Inhalation Q12H [address]    furosemide (LASIX) tablet 40 mg  40 mg Oral Daily    gabapentin (NEURONTIN) capsule 200 mg  200 mg Oral BID    heparin (porcine) subcutaneous injection 5,000 Units  5,000 Units Subcutaneous Q8H [address]    HYDROmorphone (DILAUDID) injection 0 5 mg  0 5 mg Intravenous Q4H PRN    insulin glargine (LANTUS) subcutaneous injection 12 Units 0 12 mL  12 Units Subcutaneous BID    insulin lispro (HumaLOG) 100 units/mL subcutaneous injection 1-5 Units  1-5 Units Subcutaneous TID AC    insulin lispro (HumaLOG) 100 units/mL subcutaneous injection 1-5 Units  1-5 Units Subcutaneous HS    labetalol (NORMODYNE) injection 5 mg  5 mg Intravenous Q6H PRN    lactated ringers infusion  75 mL/hr Intravenous Continuous    levETIRAcetam (KEPPRA) tablet 750 mg  750 mg Oral Daily    levothyroxine tablet 100 mcg  100 mcg Oral Early Morning    magnesium oxide (MAG-OX) tablet 400 mg  400 mg Oral BID    methocarbamol (ROBAXIN) tablet 250 mg  250 mg Oral Q6H PRN    metroNIDAZOLE (FLAGYL) tablet 500 mg  500 mg Oral Q8H [address]    oxyCODONE (ROXICODONE) IR tablet 5 mg  5 mg Oral Q4H PRN    polyethylene glycol (MIRALAX) packet 17 g  17 g Oral Daily    prochlorperazine (COMPAZINE) tablet 5 mg  5 mg Oral Q6H PRN    senna-docusate sodium (SENOKOT S) 8 6-50 mg per tablet 1 tablet  1 tablet Oral HS    sodium phosphate-biphosphate (FLEET) enema 1 enema  1 enema Rectal Once PRN       Allergies   Allergen Reactions    Other Chest Pain     IVP-listed as "chest pain" in previous chart, patient stated this occurred "a long time ago" but does not remember exactly occurred     Contrast [Iodinated Diagnostic Agents] Other (See Comments)     Flash pulm edema    Doxycycline Chest Pain    Ketorolac Other (See Comments)     Chest pain     Levaquin [Levofloxacin] Chest Pain    Ondansetron Chest Pain     Prolonged QT    Toradol [Ketorolac Tromethamine] Chest Pain       Objective     Temp:  [97 5 °F (36 4 °C)-98 6 °F (37 °C)] 98 6 °F (37 °C)  HR:  [66-96] 69  Resp:  [14-20] 20  BP: (122-168)/(60-71) 135/64      Intake/Output Summary (Last 24 hours) at 09/18/18 0936  Last data filed at 09/18/18 0910   Gross per 24 hour   Intake             1310 ml   Output             2115 ml   Net             -805 ml               Physical Exam: /64   Pulse 69   Temp 98 6 °F (37 °C) (Oral)   Resp 20   Ht 5' 4" (1 626 m)   Wt 69 6 kg (153 lb 7 oz)   LMP  (LMP Unknown)   SpO2 95%   BMI 26 34 kg/m²   General Appearance:    Awake and Alert, NAD   Neurological:   Oriented, quiet   Head:    Normocephalic, without obvious abnormality, atraumatic   Eyes:    EOM intact    Lungs:     Respirations unlabored   Chest Wall:    No deformity   Abdomen:        Round   Extremities:   Left BKA with dressing, + reis    Skin:   Skin color pale     Lab Results:     Results from last 7 days  Lab Units 09/18/18  0915   WBC Thousand/uL 11 31*   HEMOGLOBIN g/dL 9 3*   HEMATOCRIT % 29 5*   PLATELETS Thousands/uL 271      Results from last 7 days  Lab Units 09/17/18  0505  09/12/18  1003   SODIUM mmol/L 138  < >  --    POTASSIUM mmol/L 3 7  < >  --    CHLORIDE mmol/L 105  < >  --    CO2 mmol/L 28  < >  --    BUN mg/dL 10  < >  --    CREATININE mg/dL 0 62  < >  --    CALCIUM mg/dL 8 2*  < >  --    GLUCOSE, ISTAT mg/dl  -- --  104   < > = values in this interval not displayed  Counseling / Coordination of Care  Total floor / unit time spent today 15 minutes  Greater than 50% of total time was spent with the patient and / or family counseling and / or coordination of care   A description of the counseling / coordination of care: Reviewed plan of care and medications with patient, RN staff and primary care team     Criselda Kennedy MS, RN-BC  Acute Pain

## 2018-09-18 NOTE — MEDICAL STUDENT
MEDICAL STUDENT  Inpatient Progress Note for TRAINING ONLY  Not Part of Legal Medical Record       Progress Note - Ishaan Castro [de-identified] y o  female MRN: [id number]    Unit/Bed#: [facility] 527-01 Encounter: [id number]      Assessment:  [de-identified] y/o F POD1 from L BKA, significant improvement in pain  Plan:  L BKA due to 1st L toe dry gangrene 2/2 peripheral vascular disease  - Patient had a L BKA yesterday  No complications in surgery  - Today the patient is noted to be pleasant and in the least pain that she has been since her hospital admission   - Management per vascular surgery  R 3rd toe dry gangrene, R foot eschar  - Patient has been completing hyperbaric chamber   - Management per podiatry  Low urine output  - Patient was noted to have low urine output yesterday; 125 mL in 6 hours  She was given a 250 mL bolus to which she did not respond  - The urine in the reis is darker yellow in color, but not brown  Urine output noted to be 205 mL in the past 12 hours  - F/U BMP; specifically creatinine and BUN to r/o LEONARD  History of DVT/PE  - Heparin is held s/p BKA  DM type 2  - Patient's appetite is improved and she is no longer NPO  Continue Lantus and insulin as needed  HTN  - Continue patient's regimen of amlodipine 10 mg daily, IV labetalol 5 mg q 6 hours p r n  Sacral decubitus ulcer  - Stage 1 noted in OR, stage 2 previously documented  - Padding in place  Subjective:   Patient denies SOB, chest pain, headaches, and N/V  Her pain is well controlled and significantly improved postop  Objective:     Vitals: Blood pressure 135/64, pulse 69, temperature 98 6 °F (37 °C), temperature source Oral, resp  rate 20, height 5' 4" (1 626 m), weight 69 6 kg (153 lb 7 oz), SpO2 95 %, not currently breastfeeding  ,Body mass index is 26 34 kg/m²        Intake/Output Summary (Last 24 hours) at 09/18/18 0835  Last data filed at 09/18/18 0500   Gross per 24 hour   Intake             1310 ml   Output             1965 ml   Net -655 ml       Physical Exam   Constitutional: Vital signs are normal  She appears well-developed and well-nourished  HENT:   Head: Normocephalic and atraumatic  Cardiovascular: Normal rate, regular rhythm and normal heart sounds  Pulmonary/Chest: Effort normal and breath sounds normal    Abdominal: Soft  She exhibits no distension  Bowel sounds are decreased  There is tenderness  Musculoskeletal:        Left knee: She exhibits deformity (L BKA)  Feet:    R 3rd toe dry gangrene, R foot eschar  Neurological: She is alert  Psychiatric: She has a normal mood and affect  Her behavior is normal  Judgment and thought content normal        Invasive Devices     Peripheral Intravenous Line            Peripheral IV 09/16/18 Right Forearm 2 days    Peripheral IV 09/17/18 Right; Lower Arm less than 1 day          Drain            Urethral Catheter Non-latex;Straight-tip 3 days                Lab, Imaging and other studies:   Recent Results (from the past 24 hour(s))   Type and screen    Collection Time: 09/17/18  9:35 AM   Result Value Ref Range    ABO Grouping O     Rh Factor Positive     Antibody Screen Negative     Specimen Expiration Date 20180920    Fingerstick Glucose (POCT)    Collection Time: 09/17/18 11:25 AM   Result Value Ref Range    POC Glucose 98 65 - 140 mg/dl   Fingerstick Glucose (POCT)    Collection Time: 09/17/18  6:18 PM   Result Value Ref Range    POC Glucose 114 65 - 140 mg/dl   Prepare RBC:Special Requirements: None; Has consent been obtained? Yes; Date of Surgery: 9/17/2018; Where is the Surgery Scheduled?  [place], 3 Units    Collection Time: 09/17/18  7:29 PM   Result Value Ref Range    Unit Product Code F4519V89     Unit Number [id number]     Unit ABO O     Unit [facility] POS     Unit Dispense Status Crossmatched     Unit Product Code W1012V50     Unit Number [id number]     Unit ABO O     Unit [facility] POS     Unit Dispense Status Crossmatched    Fingerstick Glucose (POCT)    Collection Time: 09/17/18  9:02 PM   Result Value Ref Range    POC Glucose 161 (H) 65 - 140 mg/dl   APTT    Collection Time: 09/18/18  4:46 AM   Result Value Ref Range    PTT 37 (H) 24 - 36 seconds   Fingerstick Glucose (POCT)    Collection Time: 09/18/18  7:21 AM   Result Value Ref Range    POC Glucose 159 (H) 65 - 140 mg/dl     XR chest portable   Final Result by Su Little DO (09/12 3213)   1  Diminished inspiration  Mild vascular congestion  2   Right internal jugular central line in satisfactory position  Workstation performed: [id number]         XR chest pa & lateral   Final Result by Aracelis Rosado MD (09/08 4922)      Mild pulmonary vascular congestion and cardiomegaly raise concern for CHF  Workstation performed: [id number]         XR foot 3+ vw left   Final Result by Myrna Ahuja MD (09/07 3583)      Diffuse swelling throughout the foot  No evidence of gas or collection  No radiographic evidence of osteomyelitis  Workstation performed: [id number]         XR shoulder 2+ views LEFT   ED Interpretation by Fran Rivera MD (09/07 0903)   The shoulder was ordered by resident and interpreted by me independently  On my read, it appears:   - arthritic changes   - no obvious fracture      Final Result by Joel Corley MD (09/07 2384)      Relatively prominent ossific density projecting between acromion and humeral head felt to most likely be a large bone spur  This might be impinging on rotator cuff  Suggest further workup with MRI            Workstation performed: [id number]             VTE Pharmacologic Prophylaxis: Reason for no pharmacologic prophylaxis recent surgery  VTE Mechanical Prophylaxis: reason for no mechanical VTE prophylaxis R 3rd toe dry gangrene, eschar

## 2018-09-18 NOTE — PROGRESS NOTES
Low urine output after 250 bolus  Lobo Deng of [facility] surgery aware   Will review lab values this am

## 2018-09-18 NOTE — PROGRESS NOTES
Progress Note - Podiatry  Collins Mae [de-identified] y o  female MRN: [id number]  Unit/Bed#: [facility] 527-01 Encounter: [id number]    Assessment:  1  Dry gangrene to right 3rd and medial 1st MPJ  2  DTI right medial hallux  3  Peripheral arterial disease  4  LEONARD  5  Dm type 2  6  S/p Left BKA    Plan:  - wounds appear stable  Continue with hyperbaric oxygen therapy  Patient is stable for discharge from podiatry point of view  Follow up in outpatient wound Care Clinic with Dr Krysta Mcintosh  - continue with Betadine paint to wounds daily   - Podiatry to see next 9/22    Subjective/Objective   Chief Complaint:   Chief Complaint   Patient presents with    Foot Ulcer     PER ems, "FROM [facility], SENT FOR EVAL OF BILAT TOE INFECTIONS, ONGOING PROBLEM"       Subjective: [de-identified] y o  y/o female was seen and evaluated at bedside in no acute distress  Patient was down for her 1st treatment of hyperbaric oxygen therapy and just got back to the room to eat lunch  Patient states that she feels painful all over her body, but otherwise is doing fine  Denies any foot pain  Blood pressure 135/64, pulse 69, temperature 98 6 °F (37 °C), temperature source Oral, resp  rate 20, height 5' 4" (1 626 m), weight 69 6 kg (153 lb 7 oz), SpO2 95 %, not currently breastfeeding  ,Body mass index is 26 34 kg/m²  Invasive Devices     Peripheral Intravenous Line            Peripheral IV 09/16/18 Right Forearm 2 days    Peripheral IV 09/17/18 Right; Lower Arm less than 1 day          Drain            Urethral Catheter Non-latex;Straight-tip 4 days                Physical Exam:   General: Alert, cooperative and no distress  Lungs: Non labored breathing  Heart: Positive S1, S2  Abdomen: Soft, non-tender  Extremity:  MSK and and NVS status baseline  Right Foot dry gangrene at medial 1st MTPJ, medial hallux, 3rd digit  continue to be stable with no signs of acute infection;  No signs of active infection: no purulence, no malodor, no ascending erythema, no crepitus, no fluctuance                 Lab, Imaging and other studies:   CBC:   Lab Results   Component Value Date    WBC 11 31 (H) 09/18/2018    HGB 9 3 (L) 09/18/2018    HCT 29 5 (L) 09/18/2018    MCV 84 09/18/2018     09/18/2018    MCH 26 3 (L) 09/18/2018    MCHC 31 5 09/18/2018    RDW 20 5 (H) 09/18/2018    MPV 9 6 09/18/2018       Imaging: I have personally reviewed pertinent films in PACS  EKG, Pathology, and Other Studies: I have personally reviewed pertinent reports    VTE Pharmacologic Prophylaxis: Heparin

## 2018-09-18 NOTE — PROGRESS NOTES
Patient's urine output has been 125 ml in the last six hours  Mccormick is patent  Small bolus of LR ordered by Dr Reshma Mendieta  Will monitor patient

## 2018-09-18 NOTE — PROGRESS NOTES
[facility] - Internal Medicine Progress Note      PATIENT INFORMATION      Patient: Natalie Carrillo [de-identified] y o  female   MRN: [id number]  PCP: Omar Ma MD  Unit/Bed#: [facility] 527-01 Encounter: [id number]  Date Of Visit: 18  Current Length of Stay: 11 day(s)     ASSESSMENTS & PLAN        1   Severe peripheral vascular disease with Left Great Toe Dry Gangrene s/p left BKA  Involving both feet but mainly left great toe   Dry gangrene with concern for wet gangrene of left great toe      · Plan per Podiatry/Vascular  · Wound culture of little utility, likely colonization   Previously evaluated by Infectious Disease  No need for Abx  · Analgesia:  Plan per Acute Pain Service     2   History of DVT/PE  · Heparin subQ, Xarelto currently held  · Hold SCDs in setting of chronic lower extremity wounds     3   Coronary artery disease status post CABG  · Continue atorvastatin/aspirin     4   Paroxysmal AFib  · Continue amiodarone 20 mg b i d  rate control  · Anticoagulation as above     5   Chronic systolic heart failure (without exacerbation)  · Cont Lasix       6   Elevated INR secondary to Xarelto  · Anticoagulation as above     7   Hypertension  · Continue Amlodipine 10mg daily  · Lisinopril held  · IV labetalol 5 mg q 6 hours p r n      8   Acute kidney injury  · Resolved     9   Type 2 diabetes mellitus  · Lantus to 12 units BID and sliding scale insulin      VTE Pharmacologic Prophylaxis: Heparin   VTE Mechanical Prophylaxis: SCD's    Disposition: cont inpt care      SUBJECTIVE     No significant overnight events  Patient had no acute complaints  Per nursing staff and by my encounter, patient is in a great mood today  Patient denies chest pain, palpitations, SOB, cough, abdominal pain, nausea, vomiting, constipation, diarrhea, fevers/chills, headaches, dysuria        OBJECTIVE     Vitals:   Temp (24hrs), Av 8 °F (36 6 °C), Min:97 5 °F (36 4 °C), Max:98 6 °F (37 °C)    HR:  [66-96] 69  Resp:  [14-20] 20  BP: (122-168)/(60-71) 135/64  SpO2:  [93 %-98 %] 95 %  Body mass index is 26 34 kg/m²  Input and Output Summary (last 24 hours): Intake/Output Summary (Last 24 hours) at 09/18/18 0802  Last data filed at 09/18/18 0500   Gross per 24 hour   Intake             1310 ml   Output             1965 ml   Net             -655 ml       Physical Exam:   GENERAL: NAD  HEENT:  NC/AT, PERRL, EOMI, MMM, no scleral icterus  CARDIAC:  RRR, +S1/S2, no S3/S4 heard, no m/g/r  PULMONARY:  CTA B/L, no wheezing/rales/rhonci, non-labored breathing  ABDOMEN:  Soft, NT/ND, +BS, no rebound/guarding/rigidity  Extremities:  Left BKA wrapped in bandage        ADDITIONAL DATA     Labs & Recent Cultures:       Results from last 7 days  Lab Units 09/17/18  0505   WBC Thousand/uL 10 61*   HEMOGLOBIN g/dL 10 7*   HEMATOCRIT % 34 4*   PLATELETS Thousands/uL 282   NEUTROS PCT % 72   LYMPHS PCT % 14   MONOS PCT % 11   EOS PCT % 2       Results from last 7 days  Lab Units 09/17/18  0505  09/12/18  1003   SODIUM mmol/L 138  < >  --    POTASSIUM mmol/L 3 7  < >  --    CHLORIDE mmol/L 105  < >  --    CO2 mmol/L 28  < >  --    BUN mg/dL 10  < >  --    CREATININE mg/dL 0 62  < >  --    CALCIUM mg/dL 8 2*  < >  --    GLUCOSE, ISTAT mg/dl  --   --  104   < > = values in this interval not displayed              Results from last 7 days  Lab Units 09/11/18  0845   GRAM STAIN RESULT  No polys seen  3+ Gram negative rods  1+ Gram positive cocci in pairs   WOUND CULTURE  4+ Growth of Pseudomonas aeruginosa*  4+ Growth of Klebsiella oxytoca*  4+ Growth of Staphylococcus aureus*         Last 24 Hours Medication List:     Current Facility-Administered Medications:  acetaminophen 975 mg Oral Q8H [address] Nasreen Montenegro PA-C    albuterol 1 puff Inhalation Q4H PRN Zeus Lemus MD    amiodarone 200 mg Oral BID Zeus Lemus MD    amLODIPine 10 mg Oral Daily Zeus Lemus MD    atorvastatin 80 mg Oral Daily Zeus Lemus MD    bisacodyl 5 mg Oral Daily PRN Davis Lauri Rodriguez MD    bisacodyl 10 mg Rectal Daily PRN Kiko Gil PA-C    cefepime 2,000 mg Intravenous Q12H Jonas Sun MD Last Rate: 2,000 mg (09/18/18 0441)   cholecalciferol 1,000 Units Oral Daily Emani Wilder MD    diclofenac sodium 2 g Topical 4x Daily Davis Kinsey MD    fluticasone 1 puff Inhalation Q12H [address] Emani Wilder MD    furosemide 40 mg Oral Daily Davis Kinsey MD    gabapentin 200 mg Oral BID Nasreen Montenegro PA-C    heparin (porcine) 5,000 Units Subcutaneous [facility] Jaiden Lynn MD    HYDROmorphone 0 5 mg Intravenous Q4H PRN Nasreen Montenegro PA-C    insulin glargine 12 Units Subcutaneous BID Davis Kinsey MD    insulin lispro 1-5 Units Subcutaneous TID AC Xochitl Medici, DO    insulin lispro 1-5 Units Subcutaneous HS Xochitl Medici, DO    labetalol 5 mg Intravenous Q6H PRN Xochitl Medici, DO    lactated ringers 75 mL/hr Intravenous Continuous Jonas Sun MD Last Rate: 75 mL/hr (09/18/18 0404)   levETIRAcetam 750 mg Oral Daily Emani Wilder MD    levothyroxine 100 mcg Oral Early Morning Emani Wilder MD    magnesium oxide 400 mg Oral BID Emani Wilder MD    methocarbamol 250 mg Oral Q6H PRN Nasreen Montenegro PA-C    metroNIDAZOLE 500 mg Oral [facility] Verangela Pérez DPM    oxyCODONE 5 mg Oral Q4H PRN Davis Kinsey MD    polyethylene glycol 17 g Oral Daily Davis Kinsey MD    prochlorperazine 5 mg Oral Q6H PRN Sona Vo MD    senna-docusate sodium 1 tablet Oral HS Davis Kinsey MD    sodium phosphate-biphosphate 1 enema Rectal Once PRN Kiko Gil PA-C           Time Spent for Care: 30 mins spent in total   More than 50% of total time spent on counseling and coordination of care as described above  Current Length of Stay: 11 day(s)      Code Status: Level 3 - DNAR and DNI          ** Please Note: This note is constructed using a voice recognition dictation system   **

## 2018-09-18 NOTE — ASSESSMENT & PLAN NOTE
Severe PAD with Left foot gangrene    ~S/P Left Plantar artery exploration (Vessel not suitable as target for bypass) 9/12   ~S/P Left BKA 9/17    Plan:  --Continue Left stump dressing and knee immobilizer today  --Xarelto/Heparin gtt held (Afib/flutter)  --Pain mgt; APS following  --Continue Abx   --Will need PT/OT and rehab planning post-op  --Case Management for dispo planning

## 2018-09-18 NOTE — PROGRESS NOTES
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 [facility])   Assessment & Plan    Severe PAD with Left foot gangrene    ~S/P Left Plantar artery exploration (Vessel not suitable as target for bypass) 9/12   ~S/P Left BKA 9/17    Plan:  --Continue Left stump dressing and knee immobilizer today  --Xarelto/Heparin gtt held (Afib/flutter)  --Pain mgt; APS following  --Continue Abx   --Will need PT/OT and rehab planning post-op  --Case Management for dispo planning          Subjective:  Pt resting comfortably    Vitals:  /65 (BP Location: Left arm)   Pulse 72   Temp 97 5 °F (36 4 °C) (Axillary)   Resp 18   Ht 5' 4" (1 626 m)   Wt 69 6 kg (153 lb 7 oz)   LMP  (LMP Unknown)   SpO2 93%   BMI 26 34 kg/m²     I/Os:  I/O last 3 completed shifts: In: 1460 8 [I V :1210 8; IV Piggyback:250]  Out: 3140 [Urine:2940; Blood:200]  No intake/output data recorded  Lab Results and Cultures:   Lab Results   Component Value Date    WBC 10 61 (H) 09/17/2018    HGB 10 7 (L) 09/17/2018    HCT 34 4 (L) 09/17/2018    MCV 84 09/17/2018     09/17/2018     Lab Results   Component Value Date    GLUCOSE 104 09/12/2018    CALCIUM 8 2 (L) 09/17/2018     09/17/2018    K 3 7 09/17/2018    CO2 28 09/17/2018     09/17/2018    BUN 10 09/17/2018    CREATININE 0 62 09/17/2018     Lab Results   Component Value Date    INR 1 52 (H) 09/09/2018    INR 1 77 (H) 09/08/2018    INR 2 26 (H) 09/07/2018    PROTIME 18 4 (H) 09/09/2018    PROTIME 20 7 (H) 09/08/2018    PROTIME 25 0 (H) 09/07/2018        Blood Culture:   Lab Results   Component Value Date    BLOODCX No Growth After 5 Days  08/08/2018    BLOODCX No Growth After 5 Days   08/08/2018   ,   Urinalysis:   Lab Results   Component Value Date    COLORU Yellow 05/18/2018    COLORU Yellow 12/21/2016    CLARITYU Clear 05/18/2018    CLARITYU Transparent 12/21/2016    SPECGRAV 1 010 05/18/2018    SPECGRAV 1 010 12/21/2016    PHUR 5 0 05/18/2018    PHUR 8 12/21/2016    LEUKOCYTESUR Small (A) 05/18/2018    LEUKOCYTESUR - 12/21/2016    NITRITE neg 06/12/2018    NITRITE Negative 05/18/2018    NITRITE - 12/21/2016    PROTEINUA Negative 05/18/2018    PROTEINUA - 12/21/2016    GLUCOSEU neg 06/12/2018    GLUCOSEU Negative 05/18/2018    GLUCOSEU Negative 05/28/2015    KETONESU neg 06/12/2018    KETONESU Negative 05/18/2018    KETONESU - 12/21/2016    BILIRUBINUR Negative 05/18/2018    BILIRUBINUR Negative 05/28/2015    BLOODU Negative 05/18/2018    BLOODU - 12/21/2016   ,   Urine Culture:   Lab Results   Component Value Date    URINECX >100,000 cfu/ml Proteus mirabilis (A) 06/12/2018   ,   Wound Culure:   Lab Results   Component Value Date    WOUNDCULT 4+ Growth of Pseudomonas aeruginosa (A) 09/11/2018    WOUNDCULT 4+ Growth of Klebsiella oxytoca (A) 09/11/2018    WOUNDCULT 4+ Growth of Staphylococcus aureus (A) 09/11/2018       Medications:  Current Facility-Administered Medications   Medication Dose Route Frequency    acetaminophen (TYLENOL) tablet 975 mg  975 mg Oral Q8H [address]    albuterol (PROVENTIL HFA,VENTOLIN HFA) inhaler 1 puff  1 puff Inhalation Q4H PRN    amiodarone tablet 200 mg  200 mg Oral BID    amLODIPine (NORVASC) tablet 10 mg  10 mg Oral Daily    atorvastatin (LIPITOR) tablet 80 mg  80 mg Oral Daily    bisacodyl (DULCOLAX) EC tablet 5 mg  5 mg Oral Daily PRN    bisacodyl (DULCOLAX) rectal suppository 10 mg  10 mg Rectal Daily PRN    cefepime (MAXIPIME) 2,000 mg in dextrose 5 % 50 mL IVPB  2,000 mg Intravenous Q12H    cholecalciferol (VITAMIN D3) tablet 1,000 Units  1,000 Units Oral Daily    diclofenac sodium (VOLTAREN) 1 % topical gel 2 g  2 g Topical 4x Daily    fluticasone (FLOVENT HFA) 110 MCG/ACT inhaler 1 puff  1 puff Inhalation Q12H [facility]    furosemide (LASIX) tablet 40 mg  40 mg Oral Daily    gabapentin (NEURONTIN) capsule 200 mg  200 mg Oral BID    heparin (porcine) subcutaneous injection 5,000 Units  5,000 Units Subcutaneous Q8H [facility]    HYDROmorphone (DILAUDID) injection 0 5 mg  0 5 mg Intravenous Q4H PRN    insulin glargine (LANTUS) subcutaneous injection 12 Units 0 12 mL  12 Units Subcutaneous BID    insulin lispro (HumaLOG) 100 units/mL subcutaneous injection 1-5 Units  1-5 Units Subcutaneous TID AC    insulin lispro (HumaLOG) 100 units/mL subcutaneous injection 1-5 Units  1-5 Units Subcutaneous HS    labetalol (NORMODYNE) injection 5 mg  5 mg Intravenous Q6H PRN    lactated ringers infusion  75 mL/hr Intravenous Continuous    levETIRAcetam (KEPPRA) tablet 750 mg  750 mg Oral Daily    levothyroxine tablet 100 mcg  100 mcg Oral Early Morning    magnesium oxide (MAG-OX) tablet 400 mg  400 mg Oral BID    methocarbamol (ROBAXIN) tablet 250 mg  250 mg Oral Q6H PRN    metroNIDAZOLE (FLAGYL) tablet 500 mg  500 mg Oral Q8H [facility]    oxyCODONE (ROXICODONE) IR tablet 5 mg  5 mg Oral Q4H PRN    polyethylene glycol (MIRALAX) packet 17 g  17 g Oral Daily    prochlorperazine (COMPAZINE) tablet 5 mg  5 mg Oral Q6H PRN    senna-docusate sodium (SENOKOT S) 8 6-50 mg per tablet 1 tablet  1 tablet Oral HS    sodium phosphate-biphosphate (FLEET) enema 1 enema  1 enema Rectal Once PRN     Physical Exam:    General appearance: alert and oriented, in no acute distress  Lungs: clear to auscultation bilaterally  Heart: S1, S2 normal   Abdomen: soft, non-tender; bowel sounds normal; no masses,  no organomegaly  Extremities: Left BKA stump C/D/I, right foot dry gangrene stable, foot warm, M/S intact      Ilana Patrick PA-C  9/18/2018

## 2018-09-18 NOTE — DISCHARGE INSTRUCTIONS
Right foot wound care instructions: Apply betadine paint to dry gangrene daily  Monitor for signs of active infection  DISCHARGE INSTRUCTIONS  LOWER EXTREMITY AMPUTATION    Following discharge from the hospital, you may have some questions about your operation, your activities or your general condition  These instructions may answer some of your questions and help you adjust during the first few weeks following your operation  REHABILITATION:   All patients require some form of rehabilitation after this procedure  This will assist with your return to daily activities by incorporating exercise and balance training  This may be in the form of visiting nurses and physical therapy in your home  However, admission to a rehabilitation facility is also common for a period of time before you return home  ACTIVITY:  Your limitations for activity will be discussed prior to discharge  Physical therapy and rehab staff will direct progression of your activity based on your progression in the physical therapy  Please follow their recommendations closely  DIET:  Resume your normal diet  Try to eat low fat and low cholesterol foods  INCISION:  You may not include the operated area in a shower  Wash incisions daily w/ soap and water  Pat dry thoroughly  ACE wrap to assist w/ stump shrinkage/ edema control  Please protect the operated area from moisture when bathing/showering  It is normal to have swelling or discoloration around the incision  If increasing redness or pain develops, call our office immediately  You will have stitches or staples present after your surgery and these will be evaluated at your first post-operative visit  If any of your incisions are open and require dressing changes, you will be given instructions for your daily incision care  If you are not able to change the dressings, a visiting nurse will be arranged      Call for appt to be seen within 4 weeks    PLEASE CALL THE OFFICE IF YOU HAVE ANY QUESTIONS  [phone] [facility] [phone] [facility] FREE [phone]  [address] , [address], [place], [address]  [address], [place], [address]  [address], [facility], [address]  [address] [facility], [facility], [address]  [address], [address]  [address], [address], [address], [facility] [address]   [address] [facility], [address], [place], [address]  [address], [address] [facility], [facility], [address]            Reis Cath Care and void trial instructions---Maintain reis catheter to straight drainage  May use leg bag and shower  May flush TID prn using Joel syringe and 120 ml NSS  May use more saline ad wilian to prevent/treat cath obstruction  Remove catheter on 8th day rehab by 0600 hrs  Bladder scan if no void or less than 200ml in 6hrs  Straight cath for bladder scan >350ml and repeat process  If patient requires straight cath x3 , re-insert reis and call for Urologist follow-up  Information above  Reis can remain in place for up to 4 weeks at a time   Reis placed at [facility] on 9/20/2018

## 2018-09-19 ENCOUNTER — APPOINTMENT (OUTPATIENT)
Dept: WOUND CARE | Facility: HOSPITAL | Age: 78
DRG: 239 | End: 2018-09-19
Payer: MEDICARE

## 2018-09-19 LAB
ABO GROUP BLD BPU: NORMAL
ABO GROUP BLD BPU: NORMAL
ANION GAP SERPL CALCULATED.3IONS-SCNC: 6 MMOL/L (ref 4–13)
APTT PPP: 45 SECONDS (ref 24–36)
BPU ID: NORMAL
BPU ID: NORMAL
BUN SERPL-MCNC: 21 MG/DL (ref 5–25)
CALCIUM SERPL-MCNC: 8 MG/DL (ref 8.3–10.1)
CHLORIDE SERPL-SCNC: 103 MMOL/L (ref 100–108)
CO2 SERPL-SCNC: 28 MMOL/L (ref 21–32)
CREAT SERPL-MCNC: 0.89 MG/DL (ref 0.6–1.3)
ERYTHROCYTE [DISTWIDTH] IN BLOOD BY AUTOMATED COUNT: 20.7 % (ref 11.6–15.1)
GFR SERPL CREATININE-BSD FRML MDRD: 61 ML/MIN/1.73SQ M
GLUCOSE SERPL-MCNC: 106 MG/DL (ref 65–140)
GLUCOSE SERPL-MCNC: 113 MG/DL (ref 65–140)
GLUCOSE SERPL-MCNC: 167 MG/DL (ref 65–140)
GLUCOSE SERPL-MCNC: 204 MG/DL (ref 65–140)
GLUCOSE SERPL-MCNC: 270 MG/DL (ref 65–140)
HCT VFR BLD AUTO: 28.6 % (ref 34.8–46.1)
HGB BLD-MCNC: 8.9 G/DL (ref 11.5–15.4)
MCH RBC QN AUTO: 26.3 PG (ref 26.8–34.3)
MCHC RBC AUTO-ENTMCNC: 31.1 G/DL (ref 31.4–37.4)
MCV RBC AUTO: 85 FL (ref 82–98)
PLATELET # BLD AUTO: 236 THOUSANDS/UL (ref 149–390)
PMV BLD AUTO: 9.8 FL (ref 8.9–12.7)
POTASSIUM SERPL-SCNC: 3.8 MMOL/L (ref 3.5–5.3)
RBC # BLD AUTO: 3.38 MILLION/UL (ref 3.81–5.12)
SODIUM SERPL-SCNC: 137 MMOL/L (ref 136–145)
UNIT DISPENSE STATUS: NORMAL
UNIT DISPENSE STATUS: NORMAL
UNIT PRODUCT CODE: NORMAL
UNIT PRODUCT CODE: NORMAL
UNIT RH: NORMAL
UNIT RH: NORMAL
WBC # BLD AUTO: 11.42 THOUSAND/UL (ref 4.31–10.16)

## 2018-09-19 PROCEDURE — G8979 MOBILITY GOAL STATUS: HCPCS

## 2018-09-19 PROCEDURE — G8988 SELF CARE GOAL STATUS: HCPCS

## 2018-09-19 PROCEDURE — G8987 SELF CARE CURRENT STATUS: HCPCS

## 2018-09-19 PROCEDURE — 97167 OT EVAL HIGH COMPLEX 60 MIN: CPT

## 2018-09-19 PROCEDURE — 99024 POSTOP FOLLOW-UP VISIT: CPT | Performed by: PHYSICIAN ASSISTANT

## 2018-09-19 PROCEDURE — 80048 BASIC METABOLIC PNL TOTAL CA: CPT | Performed by: PODIATRIST

## 2018-09-19 PROCEDURE — 85730 THROMBOPLASTIN TIME PARTIAL: CPT | Performed by: SURGERY

## 2018-09-19 PROCEDURE — G0277 HBOT, FULL BODY CHAMBER, 30M: HCPCS

## 2018-09-19 PROCEDURE — G8978 MOBILITY CURRENT STATUS: HCPCS

## 2018-09-19 PROCEDURE — 99233 SBSQ HOSP IP/OBS HIGH 50: CPT | Performed by: INTERNAL MEDICINE

## 2018-09-19 PROCEDURE — 82948 REAGENT STRIP/BLOOD GLUCOSE: CPT

## 2018-09-19 PROCEDURE — 85027 COMPLETE CBC AUTOMATED: CPT | Performed by: PODIATRIST

## 2018-09-19 PROCEDURE — 97163 PT EVAL HIGH COMPLEX 45 MIN: CPT

## 2018-09-19 RX ORDER — POTASSIUM CHLORIDE 20 MEQ/1
40 TABLET, EXTENDED RELEASE ORAL ONCE
Status: COMPLETED | OUTPATIENT
Start: 2018-09-19 | End: 2018-09-19

## 2018-09-19 RX ADMIN — POLYETHYLENE GLYCOL 3350 17 G: 17 POWDER, FOR SOLUTION ORAL at 08:30

## 2018-09-19 RX ADMIN — HEPARIN SODIUM 2600 UNITS: 1000 INJECTION, SOLUTION INTRAVENOUS; SUBCUTANEOUS at 04:47

## 2018-09-19 RX ADMIN — POTASSIUM CHLORIDE 40 MEQ: 1500 TABLET, EXTENDED RELEASE ORAL at 08:31

## 2018-09-19 RX ADMIN — CEFEPIME HYDROCHLORIDE 2000 MG: 2 INJECTION, POWDER, FOR SOLUTION INTRAVENOUS at 16:55

## 2018-09-19 RX ADMIN — ACETAMINOPHEN 975 MG: 325 TABLET ORAL at 05:03

## 2018-09-19 RX ADMIN — INSULIN GLARGINE 12 UNITS: 100 INJECTION, SOLUTION SUBCUTANEOUS at 18:00

## 2018-09-19 RX ADMIN — GABAPENTIN 200 MG: 100 CAPSULE ORAL at 08:31

## 2018-09-19 RX ADMIN — GABAPENTIN 200 MG: 100 CAPSULE ORAL at 18:00

## 2018-09-19 RX ADMIN — INSULIN LISPRO 3 UNITS: 100 INJECTION, SOLUTION INTRAVENOUS; SUBCUTANEOUS at 13:12

## 2018-09-19 RX ADMIN — METHOCARBAMOL 250 MG: 500 TABLET ORAL at 16:50

## 2018-09-19 RX ADMIN — ATORVASTATIN CALCIUM 80 MG: 80 TABLET, FILM COATED ORAL at 16:51

## 2018-09-19 RX ADMIN — DICLOFENAC 2 G: 10 GEL TOPICAL at 13:13

## 2018-09-19 RX ADMIN — OXYCODONE HYDROCHLORIDE 5 MG: 5 TABLET ORAL at 09:57

## 2018-09-19 RX ADMIN — MAGNESIUM OXIDE TAB 400 MG (241.3 MG ELEMENTAL MG) 400 MG: 400 (241.3 MG) TAB at 08:30

## 2018-09-19 RX ADMIN — DICLOFENAC 2 G: 10 GEL TOPICAL at 08:31

## 2018-09-19 RX ADMIN — LEVETIRACETAM 750 MG: 750 TABLET, FILM COATED ORAL at 08:30

## 2018-09-19 RX ADMIN — AMLODIPINE BESYLATE 10 MG: 10 TABLET ORAL at 08:30

## 2018-09-19 RX ADMIN — LEVOTHYROXINE SODIUM 100 MCG: 100 TABLET ORAL at 05:03

## 2018-09-19 RX ADMIN — RIVAROXABAN 20 MG: 20 TABLET, FILM COATED ORAL at 08:30

## 2018-09-19 RX ADMIN — INSULIN LISPRO 1 UNITS: 100 INJECTION, SOLUTION INTRAVENOUS; SUBCUTANEOUS at 16:55

## 2018-09-19 RX ADMIN — OXYCODONE HYDROCHLORIDE 5 MG: 5 TABLET ORAL at 14:45

## 2018-09-19 RX ADMIN — AMIODARONE HYDROCHLORIDE 200 MG: 200 TABLET ORAL at 18:01

## 2018-09-19 RX ADMIN — FLUTICASONE PROPIONATE 1 PUFF: 110 AEROSOL, METERED RESPIRATORY (INHALATION) at 21:17

## 2018-09-19 RX ADMIN — METRONIDAZOLE 500 MG: 500 TABLET ORAL at 16:50

## 2018-09-19 RX ADMIN — DICLOFENAC 2 G: 10 GEL TOPICAL at 18:01

## 2018-09-19 RX ADMIN — METRONIDAZOLE 500 MG: 500 TABLET ORAL at 00:29

## 2018-09-19 RX ADMIN — ACETAMINOPHEN 975 MG: 325 TABLET ORAL at 21:16

## 2018-09-19 RX ADMIN — FUROSEMIDE 40 MG: 40 TABLET ORAL at 08:30

## 2018-09-19 RX ADMIN — OXYCODONE HYDROCHLORIDE 5 MG: 5 TABLET ORAL at 00:32

## 2018-09-19 RX ADMIN — BISACODYL 10 MG: 5 TABLET, COATED ORAL at 08:40

## 2018-09-19 RX ADMIN — INSULIN LISPRO 1 UNITS: 100 INJECTION, SOLUTION INTRAVENOUS; SUBCUTANEOUS at 21:17

## 2018-09-19 RX ADMIN — ACETAMINOPHEN 975 MG: 325 TABLET ORAL at 13:19

## 2018-09-19 RX ADMIN — HYDROMORPHONE HYDROCHLORIDE 0.5 MG: 1 INJECTION, SOLUTION INTRAMUSCULAR; INTRAVENOUS; SUBCUTANEOUS at 13:12

## 2018-09-19 RX ADMIN — INSULIN GLARGINE 12 UNITS: 100 INJECTION, SOLUTION SUBCUTANEOUS at 08:38

## 2018-09-19 RX ADMIN — CEFEPIME HYDROCHLORIDE 2000 MG: 2 INJECTION, POWDER, FOR SOLUTION INTRAVENOUS at 04:51

## 2018-09-19 RX ADMIN — MAGNESIUM OXIDE TAB 400 MG (241.3 MG ELEMENTAL MG) 400 MG: 400 (241.3 MG) TAB at 18:00

## 2018-09-19 RX ADMIN — AMIODARONE HYDROCHLORIDE 200 MG: 200 TABLET ORAL at 08:30

## 2018-09-19 RX ADMIN — OXYCODONE HYDROCHLORIDE 5 MG: 5 TABLET ORAL at 04:58

## 2018-09-19 RX ADMIN — DICLOFENAC 2 G: 10 GEL TOPICAL at 21:17

## 2018-09-19 RX ADMIN — VITAMIN D, TAB 1000IU (100/BT) 1000 UNITS: 25 TAB at 08:30

## 2018-09-19 RX ADMIN — METRONIDAZOLE 500 MG: 500 TABLET ORAL at 08:30

## 2018-09-19 RX ADMIN — FLUTICASONE PROPIONATE 1 PUFF: 110 AEROSOL, METERED RESPIRATORY (INHALATION) at 08:32

## 2018-09-19 NOTE — MEDICAL STUDENT
MEDICAL STUDENT  Inpatient Progress Note for TRAINING ONLY  Not Part of Legal Medical Record       Progress Note - Vi Raphael [de-identified] y o  female MRN: [id number]    Unit/Bed#: [facility] 527-01 Encounter: [id number]      Assessment:  [de-identified] y/o s/p L BKA 2/2 to peripheral vascular disease  Plan:  L BKA  R 3rd toe dry gangrene and R foot eschar  - Management per podiatry - recommended to continue hyperbaric O2 treatment and Betadine paint   - F/U with podiatry O/P  Afib/flutter  - Patient's xarelto was restarted today  History of DVT/PE  - As noted above, patient's xarelto was restarted today  DM type 2  - Patient has had good glycemic control while in the hospital  Because the blood glucose has been stable around 1108 - 204, it was discussed during rounds today to change her regimen of Lantus to 12 units in the am and 8 units at night  Subjective:   Patient complains about pain in her left lower extremity  No acute complains or significant events O/N  Patient denies SOB, chest pain, N/V, F/Cs, and abdominal pain  Her reis was removed and she was able to urinate on her own  She has not had a documented BM for 3 days, so ducolox was given and she had a BM  Objective:     Vitals: Blood pressure 141/67, pulse 71, temperature 97 9 °F (36 6 °C), temperature source Oral, resp  rate 20, height 5' 4" (1 626 m), weight 68 8 kg (151 lb 10 8 oz), SpO2 97 %, not currently breastfeeding  ,Body mass index is 26 04 kg/m²        Intake/Output Summary (Last 24 hours) at 09/19/18 1154  Last data filed at 09/19/18 0957   Gross per 24 hour   Intake          1206 31 ml   Output             1950 ml   Net          -743 69 ml       Review of Systems  General ROS: negative  Psychological ROS: negative  Respiratory ROS: no cough, shortness of breath, or wheezing  Cardiovascular ROS: no chest pain or dyspnea on exertion  Gastrointestinal ROS: no abdominal pain, change in bowel habits, or black or bloody stools  Genito-Urinary ROS: no dysuria, trouble voiding, or hematuria  Musculoskeletal ROS: negative  Neurological ROS: no TIA or stroke symptoms  Dermatological ROS: positive for R 3rd toe dry gangrene, R foot eschar     Physical Exam   Constitutional: She is oriented to person, place, and time  Vital signs are normal  She appears well-developed and well-nourished  HENT:   Head: Normocephalic and atraumatic  Cardiovascular: Normal rate, regular rhythm and normal heart sounds  Pulmonary/Chest: Effort normal and breath sounds normal    Abdominal: Soft  Bowel sounds are normal  She exhibits distension  Neurological: She is alert and oriented to person, place, and time  Skin:   Patient has 3rd R toe dry gangrene and R foot eschar  Psychiatric: She has a normal mood and affect  Her behavior is normal  Judgment and thought content normal        Invasive Devices     Peripheral Intravenous Line            Peripheral IV 09/17/18 Right; Lower Arm 1 day    Peripheral IV 09/18/18 Right Forearm less than 1 day                Lab, Imaging and other studies:   Recent Results (from the past 24 hour(s))   Fingerstick Glucose (POCT)    Collection Time: 09/18/18 12:11 PM   Result Value Ref Range    POC Glucose 180 (H) 65 - 140 mg/dl   Fingerstick Glucose (POCT)    Collection Time: 09/18/18 12:27 PM   Result Value Ref Range    POC Glucose 174 (H) 65 - 140 mg/dl   Fingerstick Glucose (POCT)    Collection Time: 09/18/18  4:28 PM   Result Value Ref Range    POC Glucose 191 (H) 65 - 140 mg/dl   APTT    Collection Time: 09/18/18  7:07 PM   Result Value Ref Range     (H) 24 - 36 seconds   Fingerstick Glucose (POCT)    Collection Time: 09/18/18  8:40 PM   Result Value Ref Range    POC Glucose 168 (H) 65 - 140 mg/dl   APTT    Collection Time: 09/19/18  3:44 AM   Result Value Ref Range    PTT 45 (H) 24 - 36 seconds   Basic metabolic panel    Collection Time: 09/19/18  3:44 AM   Result Value Ref Range    Sodium 137 136 - 145 mmol/L    Potassium 3 8 3 5 - 5 3 mmol/L Chloride 103 100 - 108 mmol/L    CO2 28 21 - 32 mmol/L    ANION GAP 6 4 - 13 mmol/L    BUN 21 5 - 25 mg/dL    Creatinine 0 89 0 60 - 1 30 mg/dL    Glucose 113 65 - 140 mg/dL    Calcium 8 0 (L) 8 3 - 10 1 mg/dL    eGFR 61 ml/min/1 73sq m   CBC    Collection Time: 09/19/18  3:44 AM   Result Value Ref Range    WBC 11 42 (H) 4 31 - 10 16 Thousand/uL    RBC 3 38 (L) 3 81 - 5 12 Million/uL    Hemoglobin 8 9 (L) 11 5 - 15 4 g/dL    Hematocrit 28 6 (L) 34 8 - 46 1 %    MCV 85 82 - 98 fL    MCH 26 3 (L) 26 8 - 34 3 pg    MCHC 31 1 (L) 31 4 - 37 4 g/dL    RDW 20 7 (H) 11 6 - 15 1 %    Platelets 253 990 - 812 Thousands/uL    MPV 9 8 8 9 - 12 7 fL   Fingerstick Glucose (POCT)    Collection Time: 09/19/18  6:48 AM   Result Value Ref Range    POC Glucose 106 65 - 140 mg/dl   Prepare RBC:Special Requirements: None; Has consent been obtained? Yes; Date of Surgery: 9/17/2018; Where is the Surgery Scheduled?  [facility], 2 Units    Collection Time: 09/19/18  7:38 AM   Result Value Ref Range    Unit Product Code X2542Y26     Unit Number [id number]     Unit ABO O     Unit [facility] POS     Unit Dispense Status Return to [facility]     Unit Product Code F3883A03     Unit Number [id number]     Unit ABO O     Unit RH POS     Unit Dispense Status Return to [facility]      VTE Pharmacologic Prophylaxis: Heparin  VTE Mechanical Prophylaxis: reason for no mechanical VTE prophylaxis R foot gangrene

## 2018-09-19 NOTE — PROGRESS NOTES
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 [facility])  Active Problems:    Essential hypertension    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 native heart without angina pectoris    Type 2 diabetes mellitus with complication, with long-term current use of insulin ([facility])    Hyperlipidemia    Gastroesophageal reflux disease without esophagitis    Ulcer of toe of left foot ([facility])    Seizure ([facility])    Anemia    PAD (peripheral artery disease) ([facility])    Elevated INR    Hypothyroidism    Rotator cuff disorder, left    Wet gangrene ([facility])      Plan/Recommendations:   Acute on chronic pain s/p Left BKA  · Acetaminophen 975mg Q8 hours  · Gabapentin 200mg BID  · Discontinue IV Dilaudid today  · Oxycodone 5mg Q4 hours PRN severe pain  · Robaxin 250mg Q6 hours PRN LE spasms      Pain controlled on current regimen  Ok from a pain standpoint for discharge  Will sign off please call with any questions  Reviewed with Vascular Team    Pain History  Current pain location(s): left stump  Pain Scale:  6-8  Severity:  Severe at times  24 hour history: Patient resting in bed, awaiting transportation to hyperbaric treatment  Left shoulder pain controlled with Voltaren gel  Left stump pain is present managed on current pain regimen       Current Analgesic regimen:  Gabapentin 200mg BID, Tylenol 975mg Q8 hours, Voltaren gel four times a day for left shoulder, Dilaudid 0 5mg IV Q4 hours PRN (one dose), Oxycodone 5mg Q4 hours PRN (4 doses), robaxin 250mg Q6 hours PRN (zero)   Bowel Regimen: Miralax daily, Senna-S at HS, Dulcolax PRN, fleet PRN       Meds/Allergies   all current active meds have been reviewed and current meds:   Current Facility-Administered Medications   Medication Dose Route Frequency    acetaminophen (TYLENOL) tablet 975 mg 975 mg Oral Q8H [address]    albuterol (PROVENTIL HFA,VENTOLIN HFA) inhaler 1 puff  1 puff Inhalation Q4H PRN    amiodarone tablet 200 mg  200 mg Oral BID    amLODIPine (NORVASC) tablet 10 mg  10 mg Oral Daily    atorvastatin (LIPITOR) tablet 80 mg  80 mg Oral Daily    bisacodyl (DULCOLAX) EC tablet 10 mg  10 mg Oral Daily    bisacodyl (DULCOLAX) rectal suppository 10 mg  10 mg Rectal Daily PRN    cefepime (MAXIPIME) 2,000 mg in dextrose 5 % 50 mL IVPB  2,000 mg Intravenous Q12H    cholecalciferol (VITAMIN D3) tablet 1,000 Units  1,000 Units Oral Daily    diclofenac sodium (VOLTAREN) 1 % topical gel 2 g  2 g Topical 4x Daily    fluticasone (FLOVENT HFA) 110 MCG/ACT inhaler 1 puff  1 puff Inhalation Q12H [address]    furosemide (LASIX) tablet 40 mg  40 mg Oral Daily    gabapentin (NEURONTIN) capsule 200 mg  200 mg Oral BID    HYDROmorphone (DILAUDID) injection 0 5 mg  0 5 mg Intravenous Q4H PRN    insulin glargine (LANTUS) subcutaneous injection 12 Units 0 12 mL  12 Units Subcutaneous BID    insulin lispro (HumaLOG) 100 units/mL subcutaneous injection 1-5 Units  1-5 Units Subcutaneous TID AC    insulin lispro (HumaLOG) 100 units/mL subcutaneous injection 1-5 Units  1-5 Units Subcutaneous HS    labetalol (NORMODYNE) injection 5 mg  5 mg Intravenous Q6H PRN    levETIRAcetam (KEPPRA) tablet 750 mg  750 mg Oral Daily    levothyroxine tablet 100 mcg  100 mcg Oral Early Morning    magnesium oxide (MAG-OX) tablet 400 mg  400 mg Oral BID    methocarbamol (ROBAXIN) tablet 250 mg  250 mg Oral Q6H PRN    metroNIDAZOLE (FLAGYL) tablet 500 mg  500 mg Oral Q8H [address]    oxyCODONE (ROXICODONE) IR tablet 5 mg  5 mg Oral Q4H PRN    polyethylene glycol (MIRALAX) packet 17 g  17 g Oral Daily    prochlorperazine (COMPAZINE) tablet 5 mg  5 mg Oral Q6H PRN    rivaroxaban (XARELTO) tablet 20 mg  20 mg Oral Daily With Breakfast    senna-docusate sodium (SENOKOT S) 8 6-50 mg per tablet 1 tablet  1 tablet Oral HS    sodium phosphate-biphosphate (FLEET) enema 1 enema  1 enema Rectal Once PRN       Allergies   Allergen Reactions    Other Chest Pain     IVP-listed as "chest pain" in previous chart, patient stated this occurred "a long time ago" but does not remember exactly occurred     Contrast [Iodinated Diagnostic Agents] Other (See Comments)     Flash pulm edema    Doxycycline Chest Pain    Ketorolac Other (See Comments)     Chest pain     Levaquin [Levofloxacin] Chest Pain    Ondansetron Chest Pain     Prolonged QT    Toradol [Ketorolac Tromethamine] Chest Pain       Objective     Temp:  [97 9 °F (36 6 °C)-98 1 °F (36 7 °C)] 97 9 °F (36 6 °C)  HR:  [71-74] 71  Resp:  [18-20] 20  BP: (130-141)/(66-70) 141/67      Intake/Output Summary (Last 24 hours) at 09/19/18 0958  Last data filed at 09/19/18 0957   Gross per 24 hour   Intake             1015 ml   Output             1850 ml   Net             -835 ml               Physical Exam: /67   Pulse 71   Temp 97 9 °F (36 6 °C) (Oral)   Resp 20   Ht 5' 4" (1 626 m)   Wt 68 8 kg (151 lb 10 8 oz)   LMP  (LMP Unknown)   SpO2 97%   BMI 26 04 kg/m²   General Appearance:    Awake and Alert, NAD   Neurological:   Oriented, quiet   Head:    Normocephalic, without obvious abnormality, atraumatic   Eyes:    EOM intact    Lungs:     Respirations unlabored   Chest Wall:    No deformity   Abdomen:        Round   Extremities:   Left BKA with dressing   Skin:   Skin color pale     Lab Results:     Results from last 7 days  Lab Units 09/19/18  0344   WBC Thousand/uL 11 42*   HEMOGLOBIN g/dL 8 9*   HEMATOCRIT % 28 6*   PLATELETS Thousands/uL 236      Results from last 7 days  Lab Units 09/19/18  0344  09/12/18  1003   SODIUM mmol/L 137  < >  --    POTASSIUM mmol/L 3 8  < >  --    CHLORIDE mmol/L 103  < >  --    CO2 mmol/L 28  < >  --    BUN mg/dL 21  < >  --    CREATININE mg/dL 0 89  < >  --    CALCIUM mg/dL 8 0*  < >  --    GLUCOSE, ISTAT mg/dl  --   --  104   < > = values in this interval not displayed  Counseling / Coordination of Care  Total floor / unit time spent today 15 minutes  Greater than 50% of total time was spent with the patient and / or family counseling and / or coordination of care   A description of the counseling / coordination of care: Reviewed plan of care and medications with patient, RN staff and primary care team     Bethany Morrissey MS, RN-BC  Acute Pain

## 2018-09-19 NOTE — RESTORATIVE TECHNICIAN NOTE
Restorative Specialist Mobility Note       Activity: Other (Comment) (Sat pt EOB)     Assistive Device:  (HHA x2)     Ambulation Response:  Tolerated fairly well  Repositioned: Sitting

## 2018-09-19 NOTE — WOUND OSTOMY CARE
Progress Note - Wound   Bertis Councilman [de-identified] y o  female MRN: [id number]  Unit/Bed#: [facility] 527-01 Encounter: [id number]      Assessment: Patient is seen today by wound care as per request of the RN   The patient is sitting at the edge of the bed   Patient recently had a Left BKA due to vascular status of the left foot   The right foot has dry gangrene of the right 3rd and medial 1st MPJ   DTI of the right medial hallux as noted in the podiatry notes   Podiatry is following the right foot wounds and the continuing with Hyperbaric oxygen therapy   Patient is to follow at the wound center with Dr Carol Coburn  Noted on sacral area is a stage 3-- hospital acquired area with 50% yellow slough in the wound bed and 50% pink tissue   The madelyn wound area is pink fragile tissue with noted peeling   Discussed the plan of care with the RN   Refer to the plan of care for the patient listed below   Plan:   1  Soft care cushion when OOB in the chair   2  Apply skin nourishing cream to the skin sandoval   3  Turn and reposition every 2 hours - using wedges   4  Sacral - cleanse with soap and water then pat dry prep madelyn wound with 3 m no sting cavilon then apply allevyn foam change every 3 days   Peel back every shift and assess the skin then reapply   5  Elevate right heel off of the bed / has prevalon boot           Objective:    Vitals: Blood pressure 137/77, pulse 79, temperature 98 5 °F (36 9 °C), temperature source Oral, resp  rate 20, height 5' 4" (1 626 m), weight 68 8 kg (151 lb 10 8 oz), SpO2 96 %, not currently breastfeeding  ,Body mass index is 26 04 kg/m²        Pressure Ulcer 09/15/18 Sacrum Mid Stage 2 mid sacrum/ 9/19/18 Slough noted stage 3 (Active)   Staging Stage III 9/19/2018  5:00 PM   Wound Description Fragile;[place];Slough 9/19/2018  5:00 PM   Madelyn-wound Assessment Clean;Dry;Fragile 9/19/2018  5:00 PM   Shape oval 9/15/2018  5:00 AM   Wound Length (cm) 1 cm 9/19/2018  5:00 PM   Wound Width (cm) 0 5 cm 9/19/2018 5:00 PM   Calculated Wound Area (cm^2) 0 5 cm^2 9/19/2018  5:00 PM   Drainage Amount Small 9/19/2018  5:00 PM   Drainage Description Serous 9/19/2018  5:00 PM   Treatment Cleansed; Offload; Turn & reposition 9/19/2018  5:00 PM   Dressing Foam, Silicone (eg  Allevyn, etc) 9/19/2018  5:00 PM   Dressing Changed New dressing applied 9/15/2018  5:00 AM   Patient Tolerance Tolerated well 9/19/2018  5:00 PM   Dressing Status Clean;Dry; Intact 9/19/2018 12:32 AM           Wound care will follow weekly call with questions     Palma Zuñiga RN BSN CWOCN

## 2018-09-19 NOTE — PROGRESS NOTES
[facility] - Internal Medicine Progress Note      PATIENT INFORMATION      Patient: Madonna Officer [de-identified] y o  female   MRN: [id number]  PCP: Mary Sue MD  Unit/Bed#: [facility] 527-01 Encounter: [id number]  Date Of Visit: 18  Current Length of Stay: 12 day(s)     ASSESSMENTS & PLAN        1   Severe peripheral vascular disease with Left Great Toe Dry Gangrene s/p left BKA  Involving both feet but mainly left great toe   Dry gangrene with concern for wet gangrene of left great toe      · Plan per Podiatry/Vascular  · Wound culture of little utility, likely colonization   Previously evaluated by Infectious Disease  No need for Abx  · Analgesia:  Plan per Acute Pain Service     2   History of DVT/PE  · Heparin subQ, Xarelto currently held, defer restarting AC to primary service  · Hold SCDs in setting of chronic lower extremity wounds     3   Coronary artery disease status post CABG  · Continue atorvastatin/aspirin     4   Paroxysmal AFib  · Continue amiodarone 20 mg b i d  rate control  · Anticoagulation as above     5   Chronic systolic heart failure (without exacerbation)  · Cont Lasix       6   Elevated INR secondary to Xarelto  · Anticoagulation as above     7   Hypertension  · Continue Amlodipine 10mg daily  · Lisinopril held  · IV labetalol 5 mg q 6 hours p r n      8   Acute kidney injury  · Resolved     9   Type 2 diabetes mellitus  · Lantus to 12 units BID and sliding scale insulin      VTE Pharmacologic Prophylaxis: Heparin   VTE Mechanical Prophylaxis: SCD's    Disposition: await placment      SUBJECTIVE     Patient complains about pain left lower extremity  Otherwise no acute complaints and no significant overnight events  Patient denies chest pain, palpitations, SOB, cough, abdominal pain, nausea, vomiting, constipation, diarrhea, fevers/chills, headaches, dysuria          OBJECTIVE     Vitals:   Temp (24hrs), Av °F (36 7 °C), Min:97 9 °F (36 6 °C), Max:98 1 °F (36 7 °C)    HR:  [71-74] 71  Resp: [18-20] 20  BP: (130-141)/(66-70) 141/67  SpO2:  [96 %-97 %] 97 %  Body mass index is 26 04 kg/m²  Input and Output Summary (last 24 hours): Intake/Output Summary (Last 24 hours) at 09/19/18 0752  Last data filed at 09/19/18 0604   Gross per 24 hour   Intake           1477 5 ml   Output             2000 ml   Net           -522 5 ml       Physical Exam:   GENERAL: NAD  HEENT:  NC/AT, PERRL, EOMI, MMM, no scleral icterus  CARDIAC:  RRR, +S1/S2, no S3/S4 heard, no m/g/r  PULMONARY:  CTA B/L, no wheezing/rales/rhonci, non-labored breathing  ABDOMEN:  Soft, NT/ND, +BS, no rebound/guarding/rigidity  Extremities:  Left bka bandaged        ADDITIONAL DATA     Labs & Recent Cultures:       Results from last 7 days  Lab Units 09/19/18  0344  09/17/18  0505   WBC Thousand/uL 11 42*  < > 10 61*   HEMOGLOBIN g/dL 8 9*  < > 10 7*   HEMATOCRIT % 28 6*  < > 34 4*   PLATELETS Thousands/uL 236  < > 282   NEUTROS PCT %  --   --  72   LYMPHS PCT %  --   --  14   MONOS PCT %  --   --  11   EOS PCT %  --   --  2   < > = values in this interval not displayed  Results from last 7 days  Lab Units 09/19/18  0344  09/12/18  1003   SODIUM mmol/L 137  < >  --    POTASSIUM mmol/L 3 8  < >  --    CHLORIDE mmol/L 103  < >  --    CO2 mmol/L 28  < >  --    BUN mg/dL 21  < >  --    CREATININE mg/dL 0 89  < >  --    CALCIUM mg/dL 8 0*  < >  --    GLUCOSE, ISTAT mg/dl  --   --  104   < > = values in this interval not displayed      Results from last 7 days  Lab Units 09/18/18  0446   INR  1 28*                 Last 24 Hours Medication List:     Current Facility-Administered Medications:  acetaminophen 975 mg Oral Q8H [address] Nasreen Montenegro PA-C    albuterol 1 puff Inhalation Q4H PRN Lia Barlow MD    amiodarone 200 mg Oral BID Lia Barlow MD    amLODIPine 10 mg Oral Daily Lia Barlow MD    atorvastatin 80 mg Oral Daily Lia Barlow MD    bisacodyl 5 mg Oral Daily PRN Davis Kinsey MD    bisacodyl 10 mg Rectal Daily PRN Isaiah Kuo, MAGDALENO    cefepime 2,000 mg Intravenous Q12H Sheridan Leos MD Last Rate: 2,000 mg (09/19/18 0458)   cholecalciferol 1,000 Units Oral Daily Frandy Quinones MD    diclofenac sodium 2 g Topical 4x Daily Davis Kinsey MD    fluticasone 1 puff Inhalation Q12H [address] Frandy Quinones MD    furosemide 40 mg Oral Daily Davis Kinsey MD    gabapentin 200 mg Oral BID Nasreen Montenegro PA-C    HYDROmorphone 0 5 mg Intravenous Q4H PRN Nasreen Montenegro PA-C    insulin glargine 12 Units Subcutaneous BID Davis Kinsey MD    insulin lispro 1-5 Units Subcutaneous TID AC Paula Lakes, DO    insulin lispro 1-5 Units Subcutaneous HS Paula Pa, DO    labetalol 5 mg Intravenous Q6H PRN Paulafawn Winn,     levETIRAcetam 750 mg Oral Daily Frandy Quinones MD    levothyroxine 100 mcg Oral Early Morning Frandy Quinones MD    magnesium oxide 400 mg Oral BID Frandy Quinones MD    methocarbamol 250 mg Oral Q6H PRN Nasreen Montenegro PA-C    metroNIDAZOLE 500 mg Oral [facility] DPM    oxyCODONE 5 mg Oral Q4H PRN Davis Kinsey MD    polyethylene glycol 17 g Oral Daily Davis Kinsey MD    potassium chloride 40 mEq Oral Once Davis Kinsey MD    prochlorperazine 5 mg Oral Q6H PRN Ro Santoyo MD    rivaroxaban 20 mg Oral Daily With Breakfast Nasreen Montenegro PA-C    senna-docusate sodium 1 tablet Oral HS Davis Kinsey MD    sodium phosphate-biphosphate 1 enema Rectal Once PRN Lakia Alarcon PA-C           Time Spent for Care: 30 mins spent in total   More than 50% of total time spent on counseling and coordination of care as described above  Current Length of Stay: 12 day(s)      Code Status: Level 3 - DNAR and DNI          ** Please Note: This note is constructed using a voice recognition dictation system   **

## 2018-09-19 NOTE — PROGRESS NOTES
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 [facility])   Assessment & Plan    Severe PAD with Left foot gangrene, Right foot gangrene    ~S/P Left Plantar artery exploration (Vessel not suitable as target for bypass) 9/12   ~S/P Left BKA 9/17    Plan:  --Local L BKA wound care with ACE wrap  --Resume Xarelto today(Afib/flutter)  --Continue hyperbaric O2 as per podiatry for Right foot  --Pain mgt; APS following  --Continue Abx   --Will need PT/OT and rehab planning post-op  --Case Management for dispo planning                Subjective:  No events overnight, pain controlled    Vitals:  /66   Pulse 74   Temp 97 9 °F (36 6 °C) (Oral)   Resp 20   Ht 5' 4" (1 626 m)   Wt 69 6 kg (153 lb 7 oz)   LMP  (LMP Unknown)   SpO2 96%   BMI 26 34 kg/m²     I/Os:  I/O last 3 completed shifts: In: 2612 5 [P O :600; I V :1762 5; IV Piggyback:250]  Out: 0511 [Urine:2215; Blood:200]  I/O this shift:  In: 175 [I V :125; IV Piggyback:50]  Out: 1300 [Urine:1300]    Lab Results and Cultures:   Lab Results   Component Value Date    WBC 11 42 (H) 09/19/2018    HGB 8 9 (L) 09/19/2018    HCT 28 6 (L) 09/19/2018    MCV 85 09/19/2018     09/19/2018     Lab Results   Component Value Date    GLUCOSE 104 09/12/2018    CALCIUM 8 0 (L) 09/19/2018     09/19/2018    K 3 8 09/19/2018    CO2 28 09/19/2018     09/19/2018    BUN 21 09/19/2018    CREATININE 0 89 09/19/2018     Lab Results   Component Value Date    INR 1 28 (H) 09/18/2018    INR 1 52 (H) 09/09/2018    INR 1 77 (H) 09/08/2018    PROTIME 16 1 (H) 09/18/2018    PROTIME 18 4 (H) 09/09/2018    PROTIME 20 7 (H) 09/08/2018        Blood Culture:   Lab Results   Component Value Date    BLOODCX No Growth After 5 Days  08/08/2018    BLOODCX No Growth After 5 Days   08/08/2018   ,   Urinalysis:   Lab Results   Component Value Date    COLORU Yellow 05/18/2018    COLORU Yellow 12/21/2016    CLARITYU Clear 05/18/2018    CLARITYU Transparent 12/21/2016    SPECGRAV 1 010 05/18/2018    SPECGRAV 1 010 12/21/2016    PHUR 5 0 05/18/2018    PHUR 8 12/21/2016    LEUKOCYTESUR Small (A) 05/18/2018    LEUKOCYTESUR - 12/21/2016    NITRITE neg 06/12/2018    NITRITE Negative 05/18/2018    NITRITE - 12/21/2016    PROTEINUA Negative 05/18/2018    PROTEINUA - 12/21/2016    GLUCOSEU neg 06/12/2018    GLUCOSEU Negative 05/18/2018    GLUCOSEU Negative 05/28/2015    KETONESU neg 06/12/2018    KETONESU Negative 05/18/2018    KETONESU - 12/21/2016    BILIRUBINUR Negative 05/18/2018    BILIRUBINUR Negative 05/28/2015    BLOODU Negative 05/18/2018    BLOODU - 12/21/2016   ,   Urine Culture:   Lab Results   Component Value Date    URINECX >100,000 cfu/ml Proteus mirabilis (A) 06/12/2018   ,   Wound Culure:   Lab Results   Component Value Date    WOUNDCULT 4+ Growth of Pseudomonas aeruginosa (A) 09/11/2018    WOUNDCULT 4+ Growth of Klebsiella oxytoca (A) 09/11/2018    WOUNDCULT 4+ Growth of Staphylococcus aureus (A) 09/11/2018       Medications:  Current Facility-Administered Medications   Medication Dose Route Frequency    acetaminophen (TYLENOL) tablet 975 mg  975 mg Oral Q8H [address]    albuterol (PROVENTIL HFA,VENTOLIN HFA) inhaler 1 puff  1 puff Inhalation Q4H PRN    amiodarone tablet 200 mg  200 mg Oral BID    amLODIPine (NORVASC) tablet 10 mg  10 mg Oral Daily    atorvastatin (LIPITOR) tablet 80 mg  80 mg Oral Daily    bisacodyl (DULCOLAX) EC tablet 5 mg  5 mg Oral Daily PRN    bisacodyl (DULCOLAX) rectal suppository 10 mg  10 mg Rectal Daily PRN    cefepime (MAXIPIME) 2,000 mg in dextrose 5 % 50 mL IVPB  2,000 mg Intravenous Q12H    cholecalciferol (VITAMIN D3) tablet 1,000 Units  1,000 Units Oral Daily    diclofenac sodium (VOLTAREN) 1 % topical gel 2 g  2 g Topical 4x Daily    fluticasone (FLOVENT HFA) 110 MCG/ACT inhaler 1 puff  1 puff Inhalation Q12H [address]    furosemide (LASIX) tablet 40 mg  40 mg Oral Daily    gabapentin (NEURONTIN) capsule 200 mg  200 mg Oral BID    heparin (porcine) 25,000 units in 250 mL infusion (premix)  3-30 Units/kg/hr (Order-Specific) Intravenous Titrated    heparin (porcine) injection 2,600 Units  2,600 Units Intravenous PRN    heparin (porcine) injection 5,200 Units  5,200 Units Intravenous PRN    HYDROmorphone (DILAUDID) injection 0 5 mg  0 5 mg Intravenous Q4H PRN    insulin glargine (LANTUS) subcutaneous injection 12 Units 0 12 mL  12 Units Subcutaneous BID    insulin lispro (HumaLOG) 100 units/mL subcutaneous injection 1-5 Units  1-5 Units Subcutaneous TID AC    insulin lispro (HumaLOG) 100 units/mL subcutaneous injection 1-5 Units  1-5 Units Subcutaneous HS    labetalol (NORMODYNE) injection 5 mg  5 mg Intravenous Q6H PRN    levETIRAcetam (KEPPRA) tablet 750 mg  750 mg Oral Daily    levothyroxine tablet 100 mcg  100 mcg Oral Early Morning    magnesium oxide (MAG-OX) tablet 400 mg  400 mg Oral BID    methocarbamol (ROBAXIN) tablet 250 mg  250 mg Oral Q6H PRN    metroNIDAZOLE (FLAGYL) tablet 500 mg  500 mg Oral Q8H [address]    oxyCODONE (ROXICODONE) IR tablet 5 mg  5 mg Oral Q4H PRN    polyethylene glycol (MIRALAX) packet 17 g  17 g Oral Daily    prochlorperazine (COMPAZINE) tablet 5 mg  5 mg Oral Q6H PRN    senna-docusate sodium (SENOKOT S) 8 6-50 mg per tablet 1 tablet  1 tablet Oral HS    sodium phosphate-biphosphate (FLEET) enema 1 enema  1 enema Rectal Once PRN       Physical Exam:    General appearance: alert and oriented, in no acute distress  Lungs: clear to auscultation bilaterally  Heart: S1, S2 normal  Abdomen: soft, non-tender; bowel sounds normal; no masses,  no organomegaly  Extremities: Left BKA, Right foot dry gangrene stable, M/S intact    Wound/Incision:  Left BKA incision clean, dry, and intact    Pulse exam:  DP: Right: doppler signal   PT: Right: doppler signal       Alex Burns PA-C  9/19/2018

## 2018-09-19 NOTE — PLAN OF CARE
Problem: OCCUPATIONAL THERAPY ADULT  Goal: Performs self-care activities at highest level of function for planned discharge setting  See evaluation for individualized goals  Treatment Interventions: ADL retraining, Functional transfer training, Endurance training, Patient/family training, Equipment evaluation/education, Compensatory technique education, Energy conservation          See flowsheet documentation for full assessment, interventions and recommendations  Limitation: Decreased ADL status, Decreased endurance, Decreased self-care trans, Decreased high-level ADLs  Prognosis: Good  Assessment: Pt is a [de-identified] y o  female who was admitted to [facility] on 9/7/2018 with 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 ([facility] [address] )  Pt is s/p L BKA on 9/17/18  Pt is also WBAT in R surgical shoe  Pt's comorbidities include h/o of 3 MI s/p 6 vessel bypass, multiple CVAs, PAD, HLD, short term memory loss, chronic anemia, and R foot osteomyelitis  See chart for other PMH  At baseline pt was I w/ ADLs, IADLs, and mobility  Pt lives alone in a [address] [place] [address] w/ 1st floor s/u  Pt's family also lives close by and is supportive  Currently pt requires S for UB ADLs, mod-max A for LB ADLs, and mod Ax2 w/ HHA vs max Ax1 w/ RW for functional mobility/transfer depending on the type of transfer (stand pivot vs sit pivot w/ slide board)  Pt currently presents with impairments in the following categories -difficulty performing ADLS, difficulty performing IADLS, activity tolerance, endurance, standing balance/tolerance, sitting balance/tolerance and memory  These impairments, as well as pt's fatigue, pain, WBS , decreased caregiver support and risk for falls  limit pt's ability to safely engage in all baseline areas of occupation, including grooming, bathing, dressing, toileting, functional mobility/transfers, house maintenance, meal prep and leisure activities  From OT standpoint, recommend inpatient rehab upon D/C   OT will continue to follow to address the below stated goals        OT Discharge Recommendation: Short Term Rehab  OT - OK to Discharge: Yes (when medically stable)

## 2018-09-19 NOTE — PLAN OF CARE
Problem: PHYSICAL THERAPY ADULT  Goal: Performs mobility at highest level of function for planned discharge setting  See evaluation for individualized goals  Treatment/Interventions: Bed mobility, Gait training, Patient/family training, LE strengthening/ROM, Functional transfer training, Endurance training, Spoke to nursing, OT          See flowsheet documentation for full assessment, interventions and recommendations  Prognosis: Good  Problem List: Decreased strength, Decreased range of motion, Decreased endurance, Impaired balance, Decreased mobility, Decreased safety awareness, Pain  Assessment: Pt is [de-identified] y o  female seen for PT evaluation s/p admit to [facility] on 9/7/2018 w/ 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 ([facility] [address] )  PT consulted to assess pt's functional mobility and d/c needs  Order placed for PT eval and tx, w/ up w/ A, WBAT R LE and in a surgical shoe per podiatry order  Comorbidities affecting pt's physical performance at time of assessment include: new BKA, wound RLE, pain  PTA, pt was requiring A for mobility and lives in multi-level home  Personal factors affecting pt at time of IE include: inaccessible home environment, inability to ambulate household distances, limited home support, inability to perform IADLs and inability to perform ADLs  Please find objective findings from PT assessment regarding body systems outlined above with impairments and limitations including weakness, decreased ROM, impaired balance, decreased endurance, gait deviations, pain, decreased activity tolerance, decreased functional mobility tolerance, decreased safety awareness and fall risk  Pt demonstrated ability to roll and complete bed mobility with minimal A  Tolerated sitting EOB with fair balance  Pt demonstrated good UE strength to complete clear buttock during scooting and lateral scooting EOB  Pt requested HHA for first time OOB   Required ModAx2 for sit<>stand and stand pivot although anticipate pt will be able to be managed Ax1 with sit pivot or RW  Will benefit from trial with RW next session to improve I  Pt is very motivated to complete therapy and improve I  The following objective measures performed on IE also reveal limitations: Barthel Index: 50/100  Pt's clinical presentation is currently unstable/unpredictable seen in pt's presentation of pain, new L BKA, RLE wounds  Pt to benefit from continued PT tx to address deficits as defined above and maximize level of functional independent mobility and consistency  From PT/mobility standpoint, recommendation at time of d/c would be IP rehab pending progress in order to facilitate return to PLOF  Barriers to Discharge: Inaccessible home environment, Decreased caregiver support     Recommendation:  (rehab)     PT - OK to Discharge:  (to rehab when medically stable )    See flowsheet documentation for full assessment

## 2018-09-19 NOTE — DISCHARGE INSTR - OTHER ORDERS
Plan:   1  Soft care cushion when OOB in the chair   2  Apply skin nourishing cream to the skin sandoval   3  Turn and reposition every 2 hours - using wedges   4  Sacral - cleanse with soap and water then pat dry prep george wound with 3 m no sting cavilon then apply allevyn foam change every 3 days   Peel back every shift and assess the skin then reapply      5  Elevate right heel off of the bed / has prevalon boot

## 2018-09-19 NOTE — PHYSICAL THERAPY NOTE
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 ([place] [address] ) [I70 269]    Problem List  Patient Active Problem List   Diagnosis    Essential hypertension    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 native heart without angina pectoris    Type 2 diabetes mellitus with complication, with long-term current use of insulin ([facility])    Atrial flutter ([facility])    Hyperlipidemia    Gastroesophageal reflux disease without esophagitis    Moderate mitral stenosis    Acute kidney injury ([place] [address] )    Arthritis of hand    Asthma    Asymptomatic carotid artery stenosis, bilateral    Atherosclerosis of native artery of both lower extremities with bilateral ulceration ([place] [address] )    Hx of CABG    Chronic anticoagulation    Chronic combined systolic and diastolic congestive heart failure ([place] [address] )    Degenerative joint disease involving multiple joints    Diabetic retinopathy ([facility])    Postoperative hypothyroidism    Migraine headache    Nephrolithiasis    Osteoarthritis of both knees    Paroxysmal atrial fibrillation ([facility])    Ulcer of toe of left foot (HCC)    Ulcer of toe of right foot ([facility])    Prolonged Q-T interval on ECG    Hypokalemia    Hypocalcemia    Left bundle branch block (LBBB)    1st degree AV block    S/P TAVR (transcatheter aortic valve replacement)    Expressive aphasia    Multiple lacunar infarcts ([facility])    Vomiting    History of CVA (cerebrovascular accident)    Seizure ([place] [address] )    NSVT (nonsustained ventricular tachycardia) ([facility])    Pulmonary hypertension ([place] [address] )    Anemia    Lactic acidosis    Hypomagnesemia    Bradycardia    Foot pain    PAD (peripheral artery disease) ([facility])    Cellulitis    CKD (chronic kidney disease) stage 3, GFR 30-59 ml/min    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)    Elevated INR    Hypothyroidism    Rotator cuff disorder, left    Wet gangrene (HCC)       Past Medical History  Past Medical History:   Diagnosis Date    Altered mental status     Anemia     Anticoagulated     Xarelto for Afib/ flutter    Asthma     Atrial flutter (HCC)     maintained on anticoag w/ Xarelto    Bradycardia     CAD (coronary artery disease)     Candidiasis     Carotid stenosis, bilateral     Cataract     Chest pain     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) (HCC)     Chronic fatigue syndrome     Chronic low back pain     Chronic ulcer of toes (HCC)     left and right    Concussion w loss of consciousness of unsp duration, init     CVA [facility]cerebral vascular accident) [facility] ) 4/17/2018    Diabetes mellitus ([facility])     type 2, insulin dependent    DJD (degenerative joint disease)     Expressive aphasia     Foot pain     GERD (gastroesophageal reflux disease)     Herpes zoster     HLD (hyperlipidemia)     HTN (hypertension)     Hypothyroidism     Irritable bowel syndrome     Kidney stone     Lumbar radiculopathy     Lyme disease     Dx in hospital 8/2011    MI (myocardial infarction) ([facility] )     Migraines     Muscle spasm     Non-neoplastic nevus     Nontoxic multinodular goiter     NSVT (nonsustained ventricular tachycardia) (HCC)     Osteoarthritis     Other chronic pain     PAD (peripheral artery disease) (HCC)     Palpitations     Paroxysmal atrial fibrillation (HCC)     Pseudogout     Pulmonary hypertension (HCC)     S/P TAVR (transcatheter aortic valve replacement)     Seizures (HCC)     Severe sepsis (HCC)     Spinal stenosis     Stroke ([facility] ) 05/2018    Transient cerebral ischemia     Trigger ring finger     Viral gastroenteritis        Past Surgical History  Past Surgical History:   Procedure Laterality Date    APPENDECTOMY      ARTERIOGRAM  12/19/2017    CARDIAC CATHETERIZATION      CHOLECYSTECTOMY      COLONOSCOPY      CORONARY ARTERY BYPASS GRAFT  2004    IR ABDOMINAL ANGIOGRAPHY / INTERVENTION  8/10/2018    IR ABDOMINAL ANGIOGRAPHY / INTERVENTION  8/21/2018    LEG AMPUTATION THROUGH LOWER TIBIA AND FIBULA Left 9/17/2018    Procedure: AMPUTATION BELOW KNEE (BKA); Surgeon: Rachel Beltran MD;  [facility];  Service: Vascular    LUMBAR LAMINECTOMY      NH ECHO TRANSESOPHAG R-T 2D W/PRB IMG ACQUISJ I&R N/A 4/3/2018    Procedure: TRANSESOPHAGEAL ECHOCARDIOGRAM (ROBB); Surgeon: Paula Rutledge DO;  [facility];  Service: Cardiac Surgery    NH REPLACE AORTIC VALVE OPENFEMORAL ARTERY APPROACH N/A 4/3/2018    Procedure: REPLACEMENT AORTIC VALVE TRANSCATHETER (TAVR) TRANSFEMORAL w/ 26MM IVY YEVGENIY S3 VALVE;  Surgeon: Paula Rutledge DO;  [facility];  Service: Cardiac Surgery    NH VEIN BYPASS GRAFT,TIB-PERONEAL Left 9/12/2018    Procedure: Exploration of posterior tibial, medial, and lateral plantar arteries  ;  Surgeon: Benny Oropeza MD;  [facility];  Service: Vascular    THYROIDECTOMY      TOTAL ABDOMINAL HYSTERECTOMY W/ BILATERAL SALPINGOOPHORECTOMY        09/19/18 1440   Note Type   Note type Eval/Treat   Pain Assessment   Pain Assessment 0-10   Pain Score 9   Pain Type Acute pain   Pain Location Incision   Pain Orientation Left   Pain Descriptors [facility] Pain Intervention(s) Ambulation/increased activity   Home Living   Type of [address] Two level   Prior Function   Level of [place] Independent with ADLs and functional mobility   Lives With Alone   Receives Help From Family   ADL Assistance Independent   IADLs Independent   Restrictions/Precautions   Weight Bearing Precautions Per Order Yes   RLE Weight Bearing Per Order WBAT  (in surgical shoe per podiatry, placing order today)   LLE Weight Bearing Per Order (BKA)   Other Precautions Pain; Fall Risk;Multiple lines;WBS   General Family/Caregiver Present No   Cognition   Orientation Level Oriented X4   RLE Assessment   RLE Assessment WFL   LLE Assessment   LLE Assessment (grossly at least 3+/5 at hip)   Coordination   Movements are Fluid and Coordinated 1   Bed Mobility   Supine to Sit 4  Minimal assistance   Additional items Assist x 1   Sit to Supine (pt left resting in chair as requested)   Transfers   Sit to Stand 3  Moderate assistance   Additional items (HHA x2; anticipate pt will be able to be managed Max1)   Stand to Sit 3  Moderate assistance   Additional items (HHA x2; anticipate pt will be able to be managed Max1)   Stand pivot 3  Moderate assistance   Additional items (HHA x2; anticipate pt will be able to be managed Max1)   Sliding Board transfer 4  Minimal assistance  (laterally scooting eob)   Additional items Assist x 1   Ambulation/Elevation   Gait pattern Not tested   Balance   Static Sitting Good   Dynamic Sitting Fair   Static Standing Fair -   Dynamic Standing Poor   Ambulatory Poor -   Endurance Deficit   Endurance Deficit Yes   Endurance Deficit Description limited by pain   Activity Tolerance   Activity Tolerance Patient limited by fatigue;Patient limited by pain   Nurse Made Aware yes, yazmin Lara gave clearance to work with pt   Assessment   Prognosis Good   Problem List Decreased strength;Decreased range of motion;Decreased endurance; Impaired balance;Decreased mobility; Decreased safety awareness;Pain   Assessment Pt is [de-identified] y o  female seen for PT evaluation s/p admit to [facility] on 9/7/2018 w/ 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 ([facility] )  PT consulted to assess pt's functional mobility and d/c needs  Order placed for PT eval and tx, w/ up w/ A, WBAT R LE and in a surgical shoe per podiatry order  Comorbidities affecting pt's physical performance at time of assessment include: new BKA, wound RLE, pain  PTA, pt was requiring A for mobility and lives in multi-level home   Personal factors affecting pt at time of IE include: inaccessible home environment, inability to ambulate household distances, limited home support, inability to perform IADLs and inability to perform ADLs  Please find objective findings from PT assessment regarding body systems outlined above with impairments and limitations including weakness, decreased ROM, impaired balance, decreased endurance, gait deviations, pain, decreased activity tolerance, decreased functional mobility tolerance, decreased safety awareness and fall risk  Pt demonstrated ability to roll and complete bed mobility with minimal A  Tolerated sitting EOB with fair balance  Pt demonstrated good UE strength to complete clear buttock during scooting and lateral scooting EOB  Pt requested HHA for first time OOB  Required ModAx2 for sit<>stand and stand pivot although anticipate pt will be able to be managed Ax1 with sit pivot or RW  Will benefit from trial with RW next session to improve I  Pt is very motivated to complete therapy and improve I  The following objective measures performed on IE also reveal limitations: Barthel Index: 50/100  Pt's clinical presentation is currently unstable/unpredictable seen in pt's presentation of pain, new L BKA, RLE wounds  Pt to benefit from continued PT tx to address deficits as defined above and maximize level of functional independent mobility and consistency  From PT/mobility standpoint, recommendation at time of d/c would be IP rehab pending progress in order to facilitate return to PLOF  Barriers to Discharge Inaccessible home environment;Decreased caregiver support   Goals   Patient Goals To get stronger at rehab and prepare for a new leg   STG Expiration Date 10/01/18   Short Term Goal #1 1  Complete bed mobility with S to decrease need for caregiver support  2  Improve balance to good to decrease risk of falls during transfer  3  Complete sit pivot with S to improve I  4  Complete stand pivot with [place]   5  I with W/C management x 150 to complete community mobility with I  6  Demonstrate ability to hop 10' with RW and Mod A  Plan   Treatment/Interventions Bed mobility;Gait training;Patient/family training;LE strengthening/ROM; Functional transfer training; Endurance training;Spoke to nursing;OT   PT Frequency (3-5x/wk)   Recommendation   Recommendation (rehab)   PT - OK to Discharge (to rehab when medically stable )   Barthel Index   Feeding 10   Bathing 0   Grooming Score 5   Dressing Score 5   Bladder Score 10   Bowels Score 10   Toilet Use Score 5   Transfers (Bed/Chair) Score 5   Mobility (Level Surface) Score 0   Stairs Score 0   Barthel Index Score 50           Zelda Pinto, PT

## 2018-09-19 NOTE — PHYSICIAN ADVISOR
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 ([place] [address] ) [I70 269]     CMS OUTLIER STAY REVIEW    After review of the relevant documentation, labs, vital signs and test results, the patient is appropriate for CONTINUED INPATIENT ADMISSION  The patient continues to remain hospitalized receiving acute medical care  The patient has surpassed the expected duration of stay, however given the clinical condition, need for further acute care management, the patient is appropriate to remain in an inpatient status  The patient still being actively managed, and does have unresolved medical issues requiring further hospitalization  This review is conducted at 10 day intervals, to help satisfy the requirements for significant outlier stay review as per CMS  Given the current condition of this patient, the patient satisfies this review was determination for continued inpatient stay  Rationale is as follows:     The patient is a [de-identified] yrs old Female who presented to the ED at 9/7/2018 12:34 PM with a chief complaint of Foot Ulcer (PER ems, "FROM [facility], SENT FOR EVAL OF BILAT TOE INFECTIONS, ONGOING PROBLEM")    The patients vitals on arrival were ED Triage Vitals [09/07/18 1242]   Temperature Pulse Respirations Blood Pressure SpO2   98 2 °F (36 8 °C) 61 18 130/57 91 %      Temp Source Heart Rate Source Patient Position - Orthostatic VS BP Location FiO2 (%)   Oral Monitor Sitting Left arm --      Pain Score       Worst Possible Pain           Past Medical History:   Diagnosis Date    Altered mental status     Anemia     Anticoagulated     Xarelto for Afib/ flutter    Asthma     Atrial flutter ([facility] )     maintained on anticoag w/ Xarelto    Bradycardia     CAD (coronary artery disease)     Candidiasis     Carotid stenosis, bilateral     Cataract     Chest pain     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) ([facility])     Chronic fatigue syndrome     Chronic low back pain     Chronic ulcer of toes ([facility])     left and right    Concussion w loss of consciousness of unsp duration, init     CVA (cerebral vascular accident) ([facility] ) 4/17/2018    Diabetes mellitus [facility] )     type 2, insulin dependent    DJD (degenerative joint disease)     Expressive aphasia     Foot pain     GERD (gastroesophageal reflux disease)     Herpes zoster     HLD (hyperlipidemia)     HTN (hypertension)     Hypothyroidism     Irritable bowel syndrome     Kidney stone     Lumbar radiculopathy     Lyme disease     Dx in hospital 8/2011    MI (myocardial infarction) ([facility] )     Migraines     Muscle spasm     Non-neoplastic nevus     Nontoxic multinodular goiter     NSVT (nonsustained ventricular tachycardia) ([facility])     Osteoarthritis     Other chronic pain     PAD (peripheral artery disease) ([facility])     Palpitations     Paroxysmal atrial fibrillation ([facility])     Pseudogout     Pulmonary hypertension ([facility])     S/P TAVR (transcatheter aortic valve replacement)     Seizures ([facility])     Severe sepsis ([facility] )     Spinal stenosis     Stroke ([facility] ) 05/2018    Transient cerebral ischemia     Trigger ring finger     Viral gastroenteritis      Past Surgical History:   Procedure Laterality Date    APPENDECTOMY      ARTERIOGRAM  12/19/2017    CARDIAC CATHETERIZATION      CHOLECYSTECTOMY      COLONOSCOPY      CORONARY ARTERY BYPASS GRAFT  2004    IR ABDOMINAL ANGIOGRAPHY / INTERVENTION  8/10/2018    IR ABDOMINAL ANGIOGRAPHY / INTERVENTION  8/21/2018    LEG AMPUTATION THROUGH LOWER TIBIA AND FIBULA Left 9/17/2018    Procedure: AMPUTATION BELOW KNEE (BKA);   Surgeon: Christian Sylvester MD; [facility];  Service: Vascular    LUMBAR LAMINECTOMY      [place] ECHO TRANSESOPHAG R-T 2D W/PRB IMG ACQUISJ I&R N/A 4/3/2018    Procedure: TRANSESOPHAGEAL ECHOCARDIOGRAM (ROBB); Surgeon: Brendan Miranda DO;  [facility];  Service: Cardiac Surgery    [place] REPLACE AORTIC VALVE OPENFEMORAL ARTERY APPROACH N/A 4/3/2018    Procedure: REPLACEMENT AORTIC VALVE TRANSCATHETER (TAVR) TRANSFEMORAL w/ 26MM IVY YEVGENIY S3 VALVE;  Surgeon: Brendan Miranda DO;  [facility];  Service: Cardiac Surgery    [place] VEIN BYPASS GRAFT,TIB-PERONEAL Left 9/12/2018    Procedure: Exploration of posterior tibial, medial, and lateral plantar arteries  ;  Surgeon: Jessica Whelan MD;  [facility];  Service: Vascular    THYROIDECTOMY      TOTAL ABDOMINAL HYSTERECTOMY W/ BILATERAL SALPINGOOPHORECTOMY             Consults have been placed to:   IP CONSULT TO VASCULAR SURGERY  IP CONSULT TO PODIATRY  INPATIENT CONSULT TO HYPERBARIAC  IP CONSULT TO ACUTE PAIN SERVICE  IP CONSULT TO CASE MANAGEMENT    Vitals:    09/18/18 2300 09/19/18 0600 09/19/18 0650 09/19/18 1501   BP: 138/66  141/67 137/77   Pulse: 74  71 79   Resp: 20  20 20   Temp: 97 9 °F (36 6 °C)  97 9 °F (36 6 °C) 98 5 °F (36 9 °C)   TempSrc: Oral  Oral Oral   SpO2: 96%  97% 96%   Weight:  68 8 kg (151 lb 10 8 oz)     Height:           Most recent labs:    Recent Labs      09/18/18   0446   09/19/18   0344   WBC   --    < >  11 42*   HGB   --    < >  8 9*   HCT   --    < >  28 6*   PLT   --    < >  236   K   --    < >  3 8   NA   --    < >  137   CALCIUM   --    < >  8 0*   BUN   --    < >  21   CREATININE   --    < >  0 89   INR  1 28*   --    --     < > = values in this interval not displayed         Scheduled Meds:  Current Facility-Administered Medications:  acetaminophen 975 mg Oral Q8H MAGDALENO Chappell    albuterol 1 puff Inhalation Q4H PRN Carissa Torres MD    amiodarone 200 mg Oral BID Carissa Torres MD    amLODIPine 10 mg Oral Daily Adalberto Lugo Otilia Shay MD    atorvastatin 80 mg Oral Daily Betzy Mcdonald MD    bisacodyl 10 mg Oral Daily Davis Kinsey MD    bisacodyl 10 mg Rectal Daily PRN Shahrzad Montenegro PA-C    cefepime 2,000 mg Intravenous Q12H Abi Arango MD Last Rate: 2,000 mg (09/19/18 0451)   cholecalciferol 1,000 Units Oral Daily Betzy Mcdonald MD    diclofenac sodium 2 g Topical 4x Daily Davis Kinsey MD    fluticasone 1 puff Inhalation Q12H [facility] Betzy Mcdonald MD    furosemide 40 mg Oral Daily Davis Kinsey MD    gabapentin 200 mg Oral BID Nasreen Montenegro PA-C    insulin glargine 12 Units Subcutaneous BID Davis Kinsey MD    insulin lispro 1-5 Units Subcutaneous TID AC Floycgisselle Mata DO    insulin lispro 1-5 Units Subcutaneous HS Floycgisselle Mata DO    labetalol 5 mg Intravenous Q6H PRN Kacy Mata DO    levETIRAcetam 750 mg Oral Daily Betzy Mcdonald MD    levothyroxine 100 mcg Oral Early Morning Betzy Mcdonald MD    magnesium oxide 400 mg Oral BID Betzy Mcdonald MD    methocarbamol 250 mg Oral Q6H PRN Shahrzad Montenegro PA-C    metroNIDAZOLE 500 mg Oral [facility] Ileana Coronado DPM    oxyCODONE 5 mg Oral Q4H PRN Davis Kinsey MD    polyethylene glycol 17 g Oral Daily Davis Kinsey MD    prochlorperazine 5 mg Oral Q6H PRN Rosa Hi MD    rivaroxaban 20 mg Oral Daily With Breakfast Nasreen Montenegro PA-C    senna-docusate sodium 1 tablet Oral HS aDvis Kinsey MD    sodium phosphate-biphosphate 1 enema Rectal Once PRN Nasreen Montenegro PA-C      Continuous Infusions:   PRN Meds: albuterol    bisacodyl    labetalol    methocarbamol    oxyCODONE    prochlorperazine    sodium phosphate-biphosphate    Surgical procedures (if appropriate):  Procedure(s):  AMPUTATION BELOW KNEE (BKA)

## 2018-09-19 NOTE — ASSESSMENT & PLAN NOTE
Severe PAD with Left foot gangrene, Right foot gangrene    ~S/P Left Plantar artery exploration (Vessel not suitable as target for bypass) 9/12   ~S/P Left BKA 9/17    Plan:  --Local L BKA wound care with ACE wrap  --Resume Xarelto today(Afib/flutter)  --Continue hyperbaric O2 as per podiatry for Right foot  --Pain mgt; APS following  --Continue Abx   --Will need PT/OT and rehab planning post-op  --Case Management for dispo planning

## 2018-09-19 NOTE — CASE MANAGEMENT
Continued Stay Review    Date: 9/19/2018    Vital Signs: /67   Pulse 71   Temp 97 9 °F (36 6 °C) (Oral)   Resp 20   Ht 5' 4" (1 626 m)   Wt 68 8 kg (151 lb 10 8 oz)   LMP  (LMP Unknown)   SpO2 97%   BMI 26 04 kg/m²     Medications:   Scheduled Meds:   Current Facility-Administered Medications:  acetaminophen 975 mg Oral Q8H LANCE    albuterol 1 puff Inhalation Q4H PRN    amiodarone 200 mg Oral BID    amLODIPine 10 mg Oral Daily    atorvastatin 80 mg Oral Daily    bisacodyl 10 mg Oral Daily    bisacodyl 10 mg Rectal Daily PRN    cefepime 2,000 mg Intravenous Q12H Last Rate: 2,000 mg (09/19/18 0451)   cholecalciferol 1,000 Units Oral Daily    diclofenac sodium 2 g Topical 4x Daily    fluticasone 1 puff Inhalation Q12H [address]    furosemide 40 mg Oral Daily    gabapentin 200 mg Oral BID    insulin glargine 12 Units Subcutaneous BID    insulin lispro 1-5 Units Subcutaneous TID AC    insulin lispro 1-5 Units Subcutaneous HS    labetalol 5 mg Intravenous Q6H PRN    levETIRAcetam 750 mg Oral Daily    levothyroxine 100 mcg Oral Early Morning    magnesium oxide 400 mg Oral BID    methocarbamol 250 mg Oral Q6H PRN    metroNIDAZOLE 500 mg Oral Q8H LANCE    oxyCODONE 5 mg Oral Q4H PRN    polyethylene glycol 17 g Oral Daily    prochlorperazine 5 mg Oral Q6H PRN    rivaroxaban 20 mg Oral Daily With Breakfast    senna-docusate sodium 1 tablet Oral HS    sodium phosphate-biphosphate 1 enema Rectal Once PRN      Continuous Infusions:    PRN Meds:     oxyCODONE - used x 4    Abnormal Labs/Diagnostic Results:   Calcium 8  Wbc 11 42, hgb 8 9, hct 28 6    0648 glucose 106  1255 glucose 270    Age/Sex: [de-identified] y o  female     Assessment/Plan:   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

## 2018-09-19 NOTE — OCCUPATIONAL THERAPY NOTE
[address] Evaluation     Patient Name: Judith Monreal Date: 9/19/2018  Problem List  Patient Active Problem List   Diagnosis    Essential hypertension    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 native heart without angina pectoris    Type 2 diabetes mellitus with complication, with long-term current use of insulin (HCC)    Atrial flutter (HCC)    Hyperlipidemia    Gastroesophageal reflux disease without esophagitis    Moderate mitral stenosis    Acute kidney injury ([address] )    Arthritis of hand    Asthma    Asymptomatic carotid artery stenosis, bilateral    Atherosclerosis of native artery of both lower extremities with bilateral ulceration ([facility] )    Hx of CABG    Chronic anticoagulation    Chronic combined systolic and diastolic congestive heart failure (HCC)    Degenerative joint disease involving multiple joints    Diabetic retinopathy (HCC)    Postoperative hypothyroidism    Migraine headache    Nephrolithiasis    Osteoarthritis of both knees    Paroxysmal atrial fibrillation (HCC)    Ulcer of toe of left foot (HCC)    Ulcer of toe of right foot (HCC)    Prolonged Q-T interval on ECG    Hypokalemia    Hypocalcemia    Left bundle branch block (LBBB)    1st degree AV block    S/P TAVR (transcatheter aortic valve replacement)    Expressive aphasia    [facility]ar infarcts ([facility])    Vomiting    History of CVA (cerebrovascular accident)    Seizure ([facility] )    NSVT (nonsustained ventricular tachycardia) (HCC)    Pulmonary hypertension (HCC)    Anemia    Lactic acidosis    Hypomagnesemia    Bradycardia    Foot pain    PAD (peripheral artery disease) (HCC)    Cellulitis    CKD (chronic kidney disease) stage 3, GFR 30-59 ml/min    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)    Elevated INR    Hypothyroidism    Rotator cuff disorder, left    Wet gangrene [facility])     Past Medical History  Past Medical History:   Diagnosis Date    Altered mental status     Anemia     Anticoagulated     Xarelto for Afib/ flutter    Asthma     Atrial flutter (HCC)     maintained on anticoag w/ Xarelto    Bradycardia     CAD (coronary artery disease)     Candidiasis     Carotid stenosis, bilateral     Cataract     Chest pain     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) (HCC)     Chronic fatigue syndrome     Chronic low back pain     Chronic ulcer of toes (HCC)     left and right    Concussion w loss of consciousness of unsp duration, init     CVA (cerebral vascular accident) ([facility] ) 4/17/2018    Diabetes mellitus ([facility] )     type 2, insulin dependent    DJD (degenerative joint disease)     Expressive aphasia     Foot pain     GERD (gastroesophageal reflux disease)     Herpes zoster     HLD (hyperlipidemia)     HTN (hypertension)     Hypothyroidism     Irritable bowel syndrome     Kidney stone     Lumbar radiculopathy     Lyme disease     Dx in hospital 8/2011    MI (myocardial infarction) ([facility] )     Migraines     Muscle spasm     Non-neoplastic nevus     Nontoxic multinodular goiter     NSVT (nonsustained ventricular tachycardia) (HCC)     Osteoarthritis     Other chronic pain     PAD (peripheral artery disease) (HCC)     Palpitations     Paroxysmal atrial fibrillation (HCC)     Pseudogout     Pulmonary hypertension (HCC)     S/P TAVR (transcatheter aortic valve replacement)     Seizures (HCC)     Severe sepsis ([facility] )     Spinal stenosis     [facility] 05/2018    Transient cerebral ischemia     Trigger ring finger     Viral gastroenteritis      Past Surgical History  Past Surgical History:   Procedure Laterality Date    APPENDECTOMY      ARTERIOGRAM  12/19/2017    CARDIAC CATHETERIZATION      CHOLECYSTECTOMY      COLONOSCOPY      CORONARY ARTERY BYPASS GRAFT  2004    IR ABDOMINAL ANGIOGRAPHY / INTERVENTION  8/10/2018    IR ABDOMINAL ANGIOGRAPHY / INTERVENTION  8/21/2018  LEG AMPUTATION THROUGH LOWER TIBIA AND FIBULA Left 9/17/2018    Procedure: AMPUTATION BELOW KNEE (BKA); Surgeon: Garth Baker MD;  [facility];  Service: Vascular    LUMBAR LAMINECTOMY      TX ECHO TRANSESOPHAG R-T 2D W/PRB IMG ACQUISJ I&R N/A 4/3/2018    Procedure: TRANSESOPHAGEAL ECHOCARDIOGRAM (ROBB); Surgeon: Chris Michaels DO;  [facility];  Service: Cardiac Surgery    TX REPLACE AORTIC VALVE OPENFEMORAL ARTERY APPROACH N/A 4/3/2018    Procedure: REPLACEMENT AORTIC VALVE TRANSCATHETER (TAVR) TRANSFEMORAL w/ 26MM IVY YEVGENIY S3 VALVE;  Surgeon: Chris Michaels DO;  [facility];  Service: Cardiac Surgery    TX VEIN BYPASS GRAFT,TIB-PERONEAL Left 9/12/2018    Procedure: Exploration of posterior tibial, medial, and lateral plantar arteries  ;  Surgeon: Claude Staggers, MD;  [facility];  Service: Vascular    THYROIDECTOMY      TOTAL ABDOMINAL HYSTERECTOMY W/ BILATERAL SALPINGOOPHORECTOMY           09/19/18 1435   Note Type   Note type Eval/Treat   Restrictions/Precautions   Weight Bearing Precautions Per Order Yes   RLE Weight Bearing Per Order WBAT  (in surgical shoe per podiatry via phone)   LLE Weight Bearing Per Order (BKA)   Other Precautions Pain; Fall Risk;Multiple lines;WBS   Pain Assessment   Pain Assessment 0-10   Pain Score 9   Pain Type Acute pain   Pain Location Leg   Pain Orientation Left   Hospital Pain Intervention(s) Ambulation/increased activity   Response to Interventions tolerated   Home Living   Type of [facility] Two level; Able to live on main level with bedroom/bathroom   Bathroom Equipment Shower chair;Commode   [address] Cane;Walker   Prior Function   Level of [place] Independent with ADLs and functional mobility   Lives With Alone   Receives Help From Family   ADL Assistance Independent   IADLs Independent   Falls in the last 6 months 0   Vocational Retired   Lifestyle Autonomy Pt I w/ ADLs, IADLs, and mobility at baseline  Has DME but was not using it   Reciprocal Relationships Pt lives alone  She has supportive family nearby   Service to Others Retired   Intrinsic Gratification Enjoys racing cars   Psychosocial   Psychosocial (WDL) WDL   ADL   Where Assessed Edge of bed   Eating Assistance 5  Supervision/Setup   Eating Deficit Setup   Grooming Assistance 5  Supervision/Setup   UB [address] 5  Supervision/Setup   LB [address] 3  Moderate Assistance   UB Dressing Assistance 5  Supervision/Setup   LB Dressing Assistance 3  Moderate Assistance   Toileting Assistance  2  Maximal Assistance   Functional Assistance 3  Moderate Assistance   Bed Mobility   Supine to Sit 4  Minimal assistance   Additional items Assist x 1   Transfers   Sit to Stand 3  Moderate assistance   Additional items Assist x 2   Stand to Sit 3  Moderate assistance   Additional items Assist x 2   Stand pivot 3  Moderate assistance   Additional items Assist x 2   Additional Comments Anticipate pt will be able to SPT w/ max Ax1 and RW  During today's transfer, pt transferred w/ HHAx2   Balance   Static Sitting Good   Dynamic Sitting Fair   Static Standing Fair -   Dynamic Standing Poor   Activity Tolerance   Activity Tolerance Patient limited by pain   Nurse Made Aware Okay to see per Gavi HUITRON   RUE Assessment   RUE Assessment WFL   LUE Assessment   LUE Assessment WFL   Hand Function   Gross Motor Coordination Functional   Fine Motor Coordination Functional   Cognition   Overall Cognitive Status WFL   Arousal/Participation Alert; Responsive; Cooperative   Attention Within functional limits   Orientation Level Oriented X4   Memory Within functional limits   Following Commands Follows all commands and directions without difficulty   Comments Pt pleasant and motivated   Assessment   Limitation Decreased ADL status; Decreased endurance;Decreased self-care trans;Decreased high-level ADLs   Prognosis Good Assessment Pt is a [de-identified] y o  female who was admitted to [facility] on 9/7/2018 with 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 ([facility] )  Pt is s/p L BKA on 9/17/18  Pt is also WBAT in R surgical shoe  Pt's comorbidities include h/o of 3 MI s/p 6 vessel bypass, multiple CVAs, PAD, HLD, short term memory loss, chronic anemia, and R foot osteomyelitis  See chart for other PMH  At baseline pt was I w/ ADLs, IADLs, and mobility  Pt lives alone in a [address] [place] [address] w/ 1st floor s/u  Pt's family also lives close by and is supportive  Currently pt requires S for UB ADLs, mod-max A for LB ADLs, and mod Ax2 w/ HHA vs max Ax1 w/ RW for functional mobility/transfer depending on the type of transfer (stand pivot vs sit pivot w/ slide board)  Pt currently presents with impairments in the following categories -difficulty performing ADLS, difficulty performing IADLS, activity tolerance, endurance, standing balance/tolerance, sitting balance/tolerance and memory  These impairments, as well as pt's fatigue, pain, WBS , decreased caregiver support and risk for falls  limit pt's ability to safely engage in all baseline areas of occupation, including grooming, bathing, dressing, toileting, functional mobility/transfers, house maintenance, meal prep and leisure activities  From OT standpoint, recommend inpatient rehab upon D/C  OT will continue to follow to address the below stated goals  Goals   Patient Goals to get stronger   LTG Time Frame 10-14   Long Term Goal see below goals    Plan   Treatment Interventions ADL retraining;Functional transfer training; Endurance training;Patient/family training;Equipment evaluation/education; Compensatory technique education; Energy conservation   Goal Expiration Date 10/03/18   OT Frequency 3-5x/wk   Recommendation   OT Discharge Recommendation Short Term Rehab   OT - OK to Discharge Yes  (when medically stable)   Barthel Index   Feeding 10   Bathing 0   Grooming Score 5   Dressing Score 5 Bladder Score 10   Bowels Score 10   Toilet Use Score 5   Transfers (Bed/Chair) Score 5   Mobility (Level Surface) Score 0   Stairs Score 0   Barthel Index Score 50   Modified Rae Scale   Modified Rae Scale 4     LTG (10-14 DAYS)  Pt will perform all ADL's at S level with G balance and DME/AE as needed      Pt will perform functional transfers, including toilet transfer, at S level w/ use of DME/AE as needed       Pt will perform functional mobility at a S level w/ use of DME and G balance      Pt will demonstrate good carry over of DME safety, WBS, and energy conservation techniques      Pt will demonstrate good carry over of pt/family education and training w/ 100% attention to task to assist w/ safe d/c planning      Pt will improve activity tolerance to G for 30-45 min treatment session      Pt will improve dynamic sitting balance to G for 15-20 minutes of purposeful activity w/ G endurance          Isamar Triana, OTR/L

## 2018-09-19 NOTE — RESTORATIVE TECHNICIAN NOTE
Restorative Specialist Mobility Note       Activity: Bedrest, Other (Comment) (Slide pt back to bed)     Assistive Device: Other (Comment) (smooth )     Ambulation Response:  Tolerated fairly well  Repositioned: Supine

## 2018-09-20 ENCOUNTER — APPOINTMENT (OUTPATIENT)
Dept: WOUND CARE | Facility: HOSPITAL | Age: 78
DRG: 239 | End: 2018-09-20
Payer: MEDICARE

## 2018-09-20 ENCOUNTER — TELEPHONE (OUTPATIENT)
Dept: INTERNAL MEDICINE CLINIC | Facility: CLINIC | Age: 78
End: 2018-09-20

## 2018-09-20 PROBLEM — L89.153 DECUBITUS ULCER OF SACRAL REGION, STAGE 3 (HCC): Status: ACTIVE | Noted: 2018-09-20

## 2018-09-20 LAB
ABO GROUP BLD BPU: NORMAL
ABO GROUP BLD BPU: NORMAL
BPU ID: NORMAL
BPU ID: NORMAL
GLUCOSE SERPL-MCNC: 140 MG/DL (ref 65–140)
GLUCOSE SERPL-MCNC: 148 MG/DL (ref 65–140)
GLUCOSE SERPL-MCNC: 154 MG/DL (ref 65–140)
GLUCOSE SERPL-MCNC: 164 MG/DL (ref 65–140)
GLUCOSE SERPL-MCNC: 190 MG/DL (ref 65–140)
GLUCOSE SERPL-MCNC: 199 MG/DL (ref 65–140)
GLUCOSE SERPL-MCNC: 235 MG/DL (ref 65–140)
GLUCOSE SERPL-MCNC: 285 MG/DL (ref 65–140)
UNIT DISPENSE STATUS: NORMAL
UNIT DISPENSE STATUS: NORMAL
UNIT PRODUCT CODE: NORMAL
UNIT PRODUCT CODE: NORMAL
UNIT RH: NORMAL
UNIT RH: NORMAL

## 2018-09-20 PROCEDURE — G0277 HBOT, FULL BODY CHAMBER, 30M: HCPCS

## 2018-09-20 PROCEDURE — 99024 POSTOP FOLLOW-UP VISIT: CPT | Performed by: SURGERY

## 2018-09-20 PROCEDURE — 99232 SBSQ HOSP IP/OBS MODERATE 35: CPT | Performed by: INTERNAL MEDICINE

## 2018-09-20 PROCEDURE — 82948 REAGENT STRIP/BLOOD GLUCOSE: CPT

## 2018-09-20 PROCEDURE — 97535 SELF CARE MNGMENT TRAINING: CPT

## 2018-09-20 RX ORDER — POTASSIUM CHLORIDE 20 MEQ/1
40 TABLET, EXTENDED RELEASE ORAL ONCE
Status: COMPLETED | OUTPATIENT
Start: 2018-09-21 | End: 2018-09-21

## 2018-09-20 RX ORDER — LISINOPRIL 10 MG/1
10 TABLET ORAL DAILY
Status: DISCONTINUED | OUTPATIENT
Start: 2018-09-20 | End: 2018-09-24 | Stop reason: HOSPADM

## 2018-09-20 RX ORDER — INSULIN GLARGINE 100 [IU]/ML
12 INJECTION, SOLUTION SUBCUTANEOUS EVERY MORNING
Status: DISCONTINUED | OUTPATIENT
Start: 2018-09-20 | End: 2018-09-24 | Stop reason: HOSPADM

## 2018-09-20 RX ORDER — INSULIN GLARGINE 100 [IU]/ML
8 INJECTION, SOLUTION SUBCUTANEOUS
Status: DISCONTINUED | OUTPATIENT
Start: 2018-09-20 | End: 2018-09-24 | Stop reason: HOSPADM

## 2018-09-20 RX ADMIN — INSULIN GLARGINE 8 UNITS: 100 INJECTION, SOLUTION SUBCUTANEOUS at 21:34

## 2018-09-20 RX ADMIN — ACETAMINOPHEN 975 MG: 325 TABLET ORAL at 21:33

## 2018-09-20 RX ADMIN — LEVETIRACETAM 750 MG: 750 TABLET, FILM COATED ORAL at 08:16

## 2018-09-20 RX ADMIN — GABAPENTIN 200 MG: 100 CAPSULE ORAL at 08:16

## 2018-09-20 RX ADMIN — ACETAMINOPHEN 975 MG: 325 TABLET ORAL at 05:15

## 2018-09-20 RX ADMIN — INSULIN GLARGINE 12 UNITS: 100 INJECTION, SOLUTION SUBCUTANEOUS at 08:22

## 2018-09-20 RX ADMIN — MAGNESIUM OXIDE TAB 400 MG (241.3 MG ELEMENTAL MG) 400 MG: 400 (241.3 MG) TAB at 08:17

## 2018-09-20 RX ADMIN — METRONIDAZOLE 500 MG: 500 TABLET ORAL at 08:22

## 2018-09-20 RX ADMIN — VITAMIN D, TAB 1000IU (100/BT) 1000 UNITS: 25 TAB at 08:16

## 2018-09-20 RX ADMIN — INSULIN LISPRO 1 UNITS: 100 INJECTION, SOLUTION INTRAVENOUS; SUBCUTANEOUS at 21:34

## 2018-09-20 RX ADMIN — FUROSEMIDE 40 MG: 40 TABLET ORAL at 08:16

## 2018-09-20 RX ADMIN — METHOCARBAMOL 250 MG: 500 TABLET ORAL at 23:04

## 2018-09-20 RX ADMIN — DICLOFENAC 2 G: 10 GEL TOPICAL at 13:35

## 2018-09-20 RX ADMIN — LEVOTHYROXINE SODIUM 100 MCG: 100 TABLET ORAL at 05:15

## 2018-09-20 RX ADMIN — MAGNESIUM OXIDE TAB 400 MG (241.3 MG ELEMENTAL MG) 400 MG: 400 (241.3 MG) TAB at 17:06

## 2018-09-20 RX ADMIN — OXYCODONE HYDROCHLORIDE 5 MG: 5 TABLET ORAL at 13:32

## 2018-09-20 RX ADMIN — DICLOFENAC 2 G: 10 GEL TOPICAL at 17:07

## 2018-09-20 RX ADMIN — BISACODYL 10 MG: 5 TABLET, COATED ORAL at 13:34

## 2018-09-20 RX ADMIN — CEFEPIME HYDROCHLORIDE 2000 MG: 2 INJECTION, POWDER, FOR SOLUTION INTRAVENOUS at 04:41

## 2018-09-20 RX ADMIN — OXYCODONE HYDROCHLORIDE 5 MG: 5 TABLET ORAL at 04:41

## 2018-09-20 RX ADMIN — AMIODARONE HYDROCHLORIDE 200 MG: 200 TABLET ORAL at 17:06

## 2018-09-20 RX ADMIN — FLUTICASONE PROPIONATE 1 PUFF: 110 AEROSOL, METERED RESPIRATORY (INHALATION) at 21:33

## 2018-09-20 RX ADMIN — AMLODIPINE BESYLATE 10 MG: 10 TABLET ORAL at 08:16

## 2018-09-20 RX ADMIN — GABAPENTIN 200 MG: 100 CAPSULE ORAL at 17:07

## 2018-09-20 RX ADMIN — RIVAROXABAN 20 MG: 20 TABLET, FILM COATED ORAL at 08:16

## 2018-09-20 RX ADMIN — METHOCARBAMOL 250 MG: 500 TABLET ORAL at 17:10

## 2018-09-20 RX ADMIN — INSULIN LISPRO 1 UNITS: 100 INJECTION, SOLUTION INTRAVENOUS; SUBCUTANEOUS at 16:30

## 2018-09-20 RX ADMIN — METHOCARBAMOL 250 MG: 500 TABLET ORAL at 04:41

## 2018-09-20 RX ADMIN — DICLOFENAC 2 G: 10 GEL TOPICAL at 21:35

## 2018-09-20 RX ADMIN — OXYCODONE HYDROCHLORIDE 5 MG: 5 TABLET ORAL at 19:57

## 2018-09-20 RX ADMIN — ATORVASTATIN CALCIUM 80 MG: 80 TABLET, FILM COATED ORAL at 16:29

## 2018-09-20 RX ADMIN — METRONIDAZOLE 500 MG: 500 TABLET ORAL at 01:03

## 2018-09-20 RX ADMIN — OXYCODONE HYDROCHLORIDE 5 MG: 5 TABLET ORAL at 09:22

## 2018-09-20 RX ADMIN — AMIODARONE HYDROCHLORIDE 200 MG: 200 TABLET ORAL at 08:17

## 2018-09-20 RX ADMIN — FLUTICASONE PROPIONATE 1 PUFF: 110 AEROSOL, METERED RESPIRATORY (INHALATION) at 08:23

## 2018-09-20 RX ADMIN — LISINOPRIL 10 MG: 10 TABLET ORAL at 08:26

## 2018-09-20 RX ADMIN — ACETAMINOPHEN 975 MG: 325 TABLET ORAL at 13:32

## 2018-09-20 RX ADMIN — INSULIN LISPRO 1 UNITS: 100 INJECTION, SOLUTION INTRAVENOUS; SUBCUTANEOUS at 13:48

## 2018-09-20 NOTE — TELEPHONE ENCOUNTER
Patient's son, Julieth Coelho, called and states that he has been having issues with his mother who is currently inpatient at [facility] for a left lower leg amputation  She is calling him daily stating her pain is not unbearable and not being managed  He is very concerned that the physicians treating her there are not keeping him up to date on progress as he has requested  He works 12 hour days and has requested the physicians contact him daily-he has not received any physician contact in 7 DAYS  When he spoke to the floor nurse today she told him they sent the orders to the physician yesterday and they don't know what more they can do for her until the physician addresses the request in their system  He does not know what to do ad is coming to us for assistance on how to manage this situation since he cannot be there with her    Dr Golden Medina was in to visit yesterday    Please call him back as soon as possible so he can help her while she is there

## 2018-09-20 NOTE — OCCUPATIONAL THERAPY NOTE
Occupational Therapy Treatment Note:       09/20/18 0920   Restrictions/Precautions   Weight Bearing Precautions Per Order Yes   RLE Weight Bearing Per Order WBAT   LLE Weight Bearing Per Order (BKA)   Other Precautions Fall Risk;Pain;WBS;Multiple lines   Pain Assessment   Pain Assessment FLACC   Pain Rating: FLACC (Rest) - Face 0   Pain Rating: FLACC (Rest) - Legs 0   Pain Rating: FLACC (Rest) - Activity 0   Pain Rating: FLACC (Rest) - Cry 0   Pain Rating: FLACC (Rest) - Consolability 0   Score: FLACC (Rest) 0   Pain Rating: FLACC (Activity) - Face 1   Pain Rating: FLACC (Activity) - Legs 0   Pain Rating: FLACC (Activity) - Activity 0   Pain Rating: FLACC (Activity) - Cry 1   Pain Rating: FLACC (Activity) - Consolability 0   Score: FLACC (Activity) 2   ADL   Grooming Assistance 4  Minimal Assistance   Grooming Deficit Brushing hair   Grooming Comments seated EOB   UB Bathing Assistance 5  Supervision/Setup   UB Bathing Deficit Setup;Supervision/safety; Increased time to complete   UB Bathing Comments seated EOB   LB Bathing Assistance 2  Maximal Assistance   LB Bathing Deficit (to wash [place] area)   LB Bathing Comments supine in bed   UB Dressing Assistance 4  Minimal Assistance   UB Dressing Deficit Pull around back;Pull down in back   [address] 3  Moderate Assistance   LB Dressing Deficit Increased time to complete;Pull up over hips   LB Dressing Comments log roll in bed    Bed Mobility   Rolling R 5  Supervision   Additional items Increased time required;HOB elevated; Bedrails   Rolling L 4  Minimal assistance   Additional items Assist x 1;HOB elevated; Increased time required   Sit to Supine 4  Minimal assistance   Additional items [facility] elevated;LE management; Increased time required;Assist x 1   Cognition   Overall Cognitive Status [facility]   Arousal/Participation Alert; Responsive; Cooperative   Attention Within functional limits   Memory Within functional limits   Following Commands Follows all commands and directions without difficulty   Activity Tolerance   Activity Tolerance Patient tolerated treatment well   Assessment   Assessment pt  seen for AM OT session focusing on ADLs of dressing, bathing, grooming and bed mobiliy  pt  requires supervision for UB bathing/ Max Asst for LB bathing  pt  needs Min Asst/Mod Asst for UB/LB dressing to don gown and practiced log roll to don underwear  pt  requires supervision/Min asst for bed mobility  pt  is pleasant and motivated to participate in therapy  [facility]  [facility]  pt  will continue to benefit from skilled OT services to regain independence for d/c    Plan   Treatment Interventions ADL retraining;Functional transfer training;Patient/family training; Compensatory technique education; Energy conservation; Activityengagement   Goal Expiration Date 10/03/18   Treatment Day 1   OT Frequency 3-5x/wk   Recommendation   OT Discharge Recommendation Short Term Rehab   Barthel Index   Feeding 10   Bathing 0   Grooming Score 0   Dressing Score 5   Bladder Score 0   Bowels Score 10   Toilet Use Score 5   Transfers (Bed/Chair) Score 5   Mobility (Level Surface) Score 0   Stairs Score 0   Barthel Index Score 35   Modified Petroleum Scale   Modified Petroleum Scale 4   Maria C Gonzales

## 2018-09-20 NOTE — PROGRESS NOTES
Returned from hyperbaric chamber after completion of treatment, tolerated well    Eating lunch at present

## 2018-09-20 NOTE — PLAN OF CARE
Problem: OCCUPATIONAL THERAPY ADULT  Goal: Performs self-care activities at highest level of function for planned discharge setting  See evaluation for individualized goals  Treatment Interventions: ADL retraining, Functional transfer training, Endurance training, Patient/family training, Equipment evaluation/education, Compensatory technique education, Energy conservation          See flowsheet documentation for full assessment, interventions and recommendations  Outcome: Progressing  Limitation: Decreased ADL status, Decreased endurance, Decreased self-care trans, Decreased high-level ADLs  Prognosis: Good  Assessment: pt  seen for AM OT session focusing on ADLs of dressing, bathing, grooming and bed mobiliy  pt  requires supervision for UB bathing/ Max Asst for LB bathing  pt  needs Min Asst/Mod Asst for UB/LB dressing to don gown and practiced log roll to don underwear  pt  requires supervision/Min asst for bed mobility  pt  is pleasant and motivated to participate in therapy  pt  was transferred to stretcher post session for wound care center   pt  will continue to benefit from skilled OT services to regain independence for d/c      OT Discharge Recommendation: Short Term Rehab  OT - OK to Discharge: Yes (when medically stable)  Vara Barrier

## 2018-09-20 NOTE — MEDICAL STUDENT
MEDICAL STUDENT  Inpatient Progress Note for TRAINING ONLY  Not Part of Legal Medical Record       Progress Note - Dorothea Lynch [de-identified] y o  female MRN: [id number]    Unit/Bed#: [facility] 527-01 Encounter: [id number]      Assessment:  Patient is an [de-identified] y F s/p L BKA 2/2 to PVD    Plan:  L BKA  - Management per vascular    - OT/PT recommended inpatient rehab  R toe gangrene  - Management per podiatry  Patient will continue hyperbaric O2 treatment in rehab  DM type 2  - Spoke during rounds to decrease patients Lantus to 12 U am and 8 U pm   HTN  - Patient has had episodes of HTN today, up to 188/74  Since the patient's lisinopril was held due to her LEONARD which is now resolved, consider restarting this  History of DVT/PE  - Continue xeralta  Afib/aflutter  - Continue xeralta    Subjective:   Patient complains of slight SOB due to her asthma as well as L knee pain, otherwise no significant events O/N  Denies chest pain, N/V, F/Cs  Objective:     Vitals: Blood pressure (!) 183/74, pulse 63, temperature 98 1 °F (36 7 °C), temperature source Oral, resp  rate 20, height 5' 4" (1 626 m), weight 68 kg (149 lb 14 6 oz), SpO2 95 %, not currently breastfeeding  ,Body mass index is 25 73 kg/m²        Intake/Output Summary (Last 24 hours) at 09/20/18 1350  Last data filed at 09/20/18 1300   Gross per 24 hour   Intake              360 ml   Output             3325 ml   Net            -2965 ml       Physical Exam: BP (!) 183/74   Pulse 63   Temp 98 1 °F (36 7 °C) (Oral)   Resp 20   Ht 5' 4" (1 626 m)   Wt 68 kg (149 lb 14 6 oz)   LMP  (LMP Unknown)   SpO2 95%   BMI 25 73 kg/m²   General appearance: alert and oriented, in no acute distress  Head: Normocephalic, without obvious abnormality, atraumatic  Lungs: clear to auscultation bilaterally  Heart: regular rate and rhythm, S1, S2 normal, no murmur, click, rub or gallop  Abdomen: soft, non-tender; bowel sounds normal; no masses,  no organomegaly     Invasive Devices     Peripheral Intravenous Line            Peripheral IV 09/17/18 Right; Lower Arm 2 days    Peripheral IV 09/18/18 Right Forearm 1 day          Drain            Urethral Catheter Latex 18 Fr  less than 1 day                Lab, Imaging and other studies:   Recent Results (from the past 24 hour(s))   Fingerstick Glucose (POCT)    Collection Time: 09/19/18  4:38 PM   Result Value Ref Range    POC Glucose 167 (H) 65 - 140 mg/dl   Fingerstick Glucose (POCT)    Collection Time: 09/19/18  8:32 PM   Result Value Ref Range    POC Glucose 204 (H) 65 - 140 mg/dl   Fingerstick Glucose (POCT)    Collection Time: 09/20/18  6:11 AM   Result Value Ref Range    POC Glucose 140 65 - 140 mg/dl   Prepare RBC:Special Requirements: None; Has consent been obtained? No; Date of Surgery: 9/17/2018; Where is the Surgery Scheduled?  [facility] Units    Collection Time: 09/20/18  6:37 AM   Result Value Ref Range    Unit Product Code V7607E99     Unit Number [id number]     Unit ABO O     Unit [facility] POS     Unit Dispense Status Return to [facility]     Unit Product Code [id number]     Unit Number [id number]     Unit ABO O     Unit [facility] POS     Unit Dispense Status Return to [facility]    Fingerstick Glucose (POCT)    Collection Time: 09/20/18  7:51 AM   Result Value Ref Range    POC Glucose 148 (H) 65 - 140 mg/dl   Fingerstick Glucose (POCT)    Collection Time: 09/20/18 12:14 PM   Result Value Ref Range    POC Glucose 154 (H) 65 - 140 mg/dl     VTE Pharmacologic Prophylaxis: Heparin  VTE Mechanical Prophylaxis: reason for no mechanical VTE prophylaxis R foot gangrene

## 2018-09-20 NOTE — PROGRESS NOTES
I spoke with José Antonio Choudhary earlier who stated that the pts son was concerned with his Mom's pain level/ meds  Pt  Told son she is constantly in pain although pain management has signed off, Per [employer], because pt's pain was controlled when she would follow up with pt  I also s/w [employer] and agree that pt has periods of confusion and forgetfulness which could make remembering last narcotic administration difficult for the pt  To remember, and narcotics could worsen pt's confusion  Versa Shallow states she will f/u with Kiana Ferrara re this issue  Will ask pt every 4 hours if pain meds required

## 2018-09-20 NOTE — TELEPHONE ENCOUNTER
Unfortunately this is a hospital concern, however it does appear that our practice is following as Dr Zackery Kocher visited her yesterday  These issues need to be addressed with hospital physician/nurses  Please let son know to address with hospital staff  Also please reach out to Dr Zackery Kocher and inform him of his concerns as well    THANKS!!

## 2018-09-20 NOTE — PROGRESS NOTES
[facility] - Internal Medicine Progress Note      PATIENT INFORMATION      Patient: Ko Mahoney [de-identified] y o  female   MRN: [id number]  PCP: Rolo Tsang MD  Unit/Bed#: [facility] 527-01 Encounter: [id number]  Date Of Visit: 18  Current Length of Stay: 13 day(s)     ASSESSMENTS & PLAN      1   Severe peripheral vascular disease with Left Great Toe Dry Gangrene s/p left BKA  Involving both feet but mainly left great toe   Dry gangrene with concern for wet gangrene of left great toe      · Plan per Podiatry/Vascular  · Wound culture of little utility, likely colonization   Previously evaluated by Infectious Disease  No need for Abx  · Analgesia:  Plan per Acute Pain Service     2   History of DVT/PE  · Xarelto 20mg daily  · Hold SCDs in setting of chronic lower extremity wounds     3   Coronary artery disease status post CABG  · Continue atorvastatin/aspirin     4   Paroxysmal AFib  · Continue amiodarone 20 mg b i d  rate control  · Anticoagulation as above     5   Chronic systolic heart failure (without exacerbation)  · Cont Lasix       6   Elevated INR secondary to Xarelto  · Anticoagulation as above     7   Hypertension  · Continue Amlodipine 10mg daily  · Restart lisinopril 10mg  · IV labetalol 5 mg q 6 hours p r n      8   Acute kidney injury  · Resolved     9   Type 2 diabetes mellitus  · Lantus to 12 units a m  and 8 units p m  VTE Pharmacologic Prophylaxis: xarelto  VTE Mechanical Prophylaxis: SCD's    Disposition:  Await placement      SUBJECTIVE     Pain in left leg, mild  Patient denies chest pain, palpitations, SOB, cough, abdominal pain, nausea, vomiting, constipation, diarrhea, fevers/chills, headaches, dysuria  OBJECTIVE     Vitals:   Temp (24hrs), Av 3 °F (36 8 °C), Min:98 1 °F (36 7 °C), Max:98 5 °F (36 9 °C)    HR:  [63-79] 63  Resp:  [20] 20  BP: (137-183)/(74-77) 183/74  SpO2:  [95 %-96 %] 95 %  Body mass index is 25 73 kg/m²       Input and Output Summary (last 24 hours): Intake/Output Summary (Last 24 hours) at 09/20/18 0820  Last data filed at 09/20/18 0501   Gross per 24 hour   Intake              600 ml   Output             2025 ml   Net            -1425 ml       Physical Exam:   GENERAL: NAD  HEENT:  NC/AT, PERRL, EOMI, MMM, no scleral icterus  CARDIAC:  RRR, +S1/S2, no S3/S4 heard, no m/g/r  PULMONARY:  CTA B/L, no wheezing/rales/rhonci, non-labored breathing  ABDOMEN:  Soft, NT/ND, +BS, no rebound/guarding/rigidity  Extremities:  Bandaged left BKA        ADDITIONAL DATA     Labs & Recent Cultures:       Results from last 7 days  Lab Units 09/19/18  0344  09/17/18  0505   WBC Thousand/uL 11 42*  < > 10 61*   HEMOGLOBIN g/dL 8 9*  < > 10 7*   HEMATOCRIT % 28 6*  < > 34 4*   PLATELETS Thousands/uL 236  < > 282   NEUTROS PCT %  --   --  72   LYMPHS PCT %  --   --  14   MONOS PCT %  --   --  11   EOS PCT %  --   --  2   < > = values in this interval not displayed      Results from last 7 days  Lab Units 09/19/18  0344   SODIUM mmol/L 137   POTASSIUM mmol/L 3 8   CHLORIDE mmol/L 103   CO2 mmol/L 28   BUN mg/dL 21   CREATININE mg/dL 0 89   CALCIUM mg/dL 8 0*       Results from last 7 days  Lab Units 09/18/18  0446   INR  1 28*                 Last 24 Hours Medication List:     Current Facility-Administered Medications:  acetaminophen 975 mg Oral Q8H [address] Nasreen Montenegro PA-C    albuterol 1 puff Inhalation Q4H PRN Felipe Pink MD    amiodarone 200 mg Oral BID Felipe Pink MD    amLODIPine 10 mg Oral Daily Felipe Pink MD    atorvastatin 80 mg Oral Daily Felipe Pink MD    bisacodyl 10 mg Oral Daily Davis Kinsey MD    bisacodyl 10 mg Rectal Daily PRN Km Ellison PA-C    cefepime 2,000 mg Intravenous Q12H Bharathi Ponce MD Last Rate: 2,000 mg (09/20/18 0441)   cholecalciferol 1,000 Units Oral Daily Felipe Pink MD    diclofenac sodium 2 g Topical 4x Daily Davis Kinsey MD    fluticasone 1 puff Inhalation Q12H [address] Eura MD Apryl    furosemide 40 mg Oral Daily Davis Kinsey MD    gabapentin 200 mg Oral BID Nasreen Montenegro PA-C    insulin glargine 12 Units Subcutaneous QAM Davis Kinsey MD    insulin glargine 8 Units Subcutaneous HS Davis Kinsey MD    insulin lispro 1-5 Units Subcutaneous TID AC Claven Brittle, DO    insulin lispro 1-5 Units Subcutaneous HS Claven Brittle, DO    labetalol 5 mg Intravenous Q6H PRN Claven Brittle, DO    levETIRAcetam 750 mg Oral Daily Clint Patel MD    levothyroxine 100 mcg Oral Early Morning Clint Patel MD    magnesium oxide 400 mg Oral BID Clint Patel MD    methocarbamol 250 mg Oral Q6H PRN Modesto Montenegro PA-C    metroNIDAZOLE 500 mg Oral [facility] Taj Mckinley DPSHANEKA    oxyCODONE 5 mg Oral Q4H PRN Davis Kinsey MD    polyethylene glycol 17 g Oral Daily Davis Kinsey MD    prochlorperazine 5 mg Oral Q6H PRN Pa Donnelly MD    rivaroxaban 20 mg Oral Daily With Breakfast Nasreen Montenegro PA-C    senna-docusate sodium 1 tablet Oral HS Davis Kinsye MD    sodium phosphate-biphosphate 1 enema Rectal Once PRN García Cabrera PA-C           Time Spent for Care: 30 mins spent in total   More than 50% of total time spent on counseling and coordination of care as described above  Current Length of Stay: 13 day(s)      Code Status: Level 3 - DNAR and DNI          ** Please Note: This note is constructed using a voice recognition dictation system   **

## 2018-09-20 NOTE — PROGRESS NOTES
Progress Note - Vascular Surgery   David Rojo [de-identified] y o  female MRN: [id number]  Unit/Bed#: [facility] 527-01 Encounter: [id number]    Assessment:  78F with severe PAD complicated by left and right foot gangrene    - S/p Left plantar artery exploration - aborted due to no suitable bypass target 9/12  - S/p Left BKA 9/17    Plan:  - Local L BKA wound care - ACE wrap   - Knee immobilizer removed yesterday, dressing down  - Continue Antiocoagulation   - Xarelto  - Continue to monitor Urine Output  - Right foot gangrene - Hyperbaric O2 per podiatry  - Continue Abx   - Cefepime/Flagyl  - PT/OT - rehab planning post-op, inpatient rehab  - CM - dispo planning    Subjective/Objective   Chief Complaint:     Subjective:  No acute events  Patient with some discomfort in left stump  Knee immobilizer removed yesterday  Denies fevers, chills, nausea, vomiting, shortness of breath or chest pain  Objective:     Blood pressure 156/74, pulse 63, temperature 98 3 °F (36 8 °C), temperature source Oral, resp  rate 20, height 5' 4" (1 626 m), weight 68 8 kg (151 lb 10 8 oz), SpO2 96 %, not currently breastfeeding  ,Body mass index is 26 04 kg/m²  Intake/Output Summary (Last 24 hours) at 09/20/18 0305  Last data filed at 09/19/18 2235   Gross per 24 hour   Intake           791 31 ml   Output             1500 ml   Net          -708 69 ml       Invasive Devices     Peripheral Intravenous Line            Peripheral IV 09/17/18 Right; Lower Arm 2 days    Peripheral IV 09/18/18 Right Forearm 1 day                Physical Exam: General: AAOx3  Respiratory: BS b/l  Abdomen: Soft, NT, no rebound/guarding  Heart: RRR, S1s2  Ext: Left BKA - incision c/d/i-dressing changed, Adaptic placed over stump with ABD Kerlix wrap and Ace wrap    RLE - dry gangrene foot, stable   - Motor/Sensation intact  Pulses: DP Doppler right foot  PT Doppler left foot                Lab, Imaging and other studies:  I have personally reviewed pertinent lab results  , CBC:   Lab Results   Component Value Date    WBC 11 42 (H) 09/19/2018    HGB 8 9 (L) 09/19/2018    HCT 28 6 (L) 09/19/2018    MCV 85 09/19/2018     09/19/2018    MCH 26 3 (L) 09/19/2018    MCHC 31 1 (L) 09/19/2018    RDW 20 7 (H) 09/19/2018    MPV 9 8 09/19/2018   , CMP:   Lab Results   Component Value Date     09/19/2018    K 3 8 09/19/2018     09/19/2018    CO2 28 09/19/2018    BUN 21 09/19/2018    CREATININE 0 89 09/19/2018    CALCIUM 8 0 (L) 09/19/2018    EGFR 61 09/19/2018     VTE Pharmacologic Prophylaxis: Reason for no pharmacologic prophylaxis patient on Xarelto  VTE Mechanical Prophylaxis: sequential compression device

## 2018-09-20 NOTE — PHYSICAL THERAPY NOTE
Physical Therapy Cancellation Note    Patient currently in hyperbarics  Will continue to follow  Tiana Anderson, PT

## 2018-09-21 ENCOUNTER — APPOINTMENT (OUTPATIENT)
Dept: WOUND CARE | Facility: HOSPITAL | Age: 78
DRG: 239 | End: 2018-09-21
Payer: MEDICARE

## 2018-09-21 LAB
GLUCOSE SERPL-MCNC: 119 MG/DL (ref 65–140)
GLUCOSE SERPL-MCNC: 131 MG/DL (ref 65–140)
GLUCOSE SERPL-MCNC: 156 MG/DL (ref 65–140)
GLUCOSE SERPL-MCNC: 264 MG/DL (ref 65–140)

## 2018-09-21 PROCEDURE — 0T9B70Z DRAINAGE OF BLADDER WITH DRAINAGE DEVICE, VIA NATURAL OR ARTIFICIAL OPENING: ICD-10-PCS | Performed by: SURGERY

## 2018-09-21 PROCEDURE — 99222 1ST HOSP IP/OBS MODERATE 55: CPT | Performed by: NURSE PRACTITIONER

## 2018-09-21 PROCEDURE — G0277 HBOT, FULL BODY CHAMBER, 30M: HCPCS

## 2018-09-21 PROCEDURE — 99222 1ST HOSP IP/OBS MODERATE 55: CPT | Performed by: PSYCHIATRY & NEUROLOGY

## 2018-09-21 PROCEDURE — 97535 SELF CARE MNGMENT TRAINING: CPT

## 2018-09-21 PROCEDURE — 97530 THERAPEUTIC ACTIVITIES: CPT

## 2018-09-21 PROCEDURE — 82948 REAGENT STRIP/BLOOD GLUCOSE: CPT

## 2018-09-21 PROCEDURE — 99233 SBSQ HOSP IP/OBS HIGH 50: CPT | Performed by: INTERNAL MEDICINE

## 2018-09-21 PROCEDURE — 97112 NEUROMUSCULAR REEDUCATION: CPT

## 2018-09-21 RX ORDER — OXYCODONE HYDROCHLORIDE 5 MG/1
5 TABLET ORAL ONCE
Status: COMPLETED | OUTPATIENT
Start: 2018-09-21 | End: 2018-09-21

## 2018-09-21 RX ADMIN — MAGNESIUM OXIDE TAB 400 MG (241.3 MG ELEMENTAL MG) 400 MG: 400 (241.3 MG) TAB at 08:55

## 2018-09-21 RX ADMIN — MAGNESIUM OXIDE TAB 400 MG (241.3 MG ELEMENTAL MG) 400 MG: 400 (241.3 MG) TAB at 18:09

## 2018-09-21 RX ADMIN — ACETAMINOPHEN 975 MG: 325 TABLET ORAL at 13:03

## 2018-09-21 RX ADMIN — METHOCARBAMOL 250 MG: 500 TABLET ORAL at 23:57

## 2018-09-21 RX ADMIN — LEVOTHYROXINE SODIUM 100 MCG: 100 TABLET ORAL at 05:57

## 2018-09-21 RX ADMIN — OXYCODONE HYDROCHLORIDE 5 MG: 5 TABLET ORAL at 14:33

## 2018-09-21 RX ADMIN — INSULIN LISPRO 2 UNITS: 100 INJECTION, SOLUTION INTRAVENOUS; SUBCUTANEOUS at 18:11

## 2018-09-21 RX ADMIN — GABAPENTIN 200 MG: 100 CAPSULE ORAL at 18:09

## 2018-09-21 RX ADMIN — OXYCODONE HYDROCHLORIDE 5 MG: 5 TABLET ORAL at 22:43

## 2018-09-21 RX ADMIN — BISACODYL 10 MG: 5 TABLET, COATED ORAL at 08:55

## 2018-09-21 RX ADMIN — ATORVASTATIN CALCIUM 80 MG: 80 TABLET, FILM COATED ORAL at 18:10

## 2018-09-21 RX ADMIN — AMLODIPINE BESYLATE 10 MG: 10 TABLET ORAL at 08:55

## 2018-09-21 RX ADMIN — LEVETIRACETAM 750 MG: 750 TABLET, FILM COATED ORAL at 08:55

## 2018-09-21 RX ADMIN — RIVAROXABAN 20 MG: 20 TABLET, FILM COATED ORAL at 08:55

## 2018-09-21 RX ADMIN — OXYCODONE HYDROCHLORIDE 5 MG: 5 TABLET ORAL at 13:07

## 2018-09-21 RX ADMIN — INSULIN GLARGINE 8 UNITS: 100 INJECTION, SOLUTION SUBCUTANEOUS at 21:40

## 2018-09-21 RX ADMIN — DICLOFENAC 2 G: 10 GEL TOPICAL at 13:17

## 2018-09-21 RX ADMIN — GABAPENTIN 200 MG: 100 CAPSULE ORAL at 08:54

## 2018-09-21 RX ADMIN — DICLOFENAC 2 G: 10 GEL TOPICAL at 18:10

## 2018-09-21 RX ADMIN — INSULIN LISPRO 1 UNITS: 100 INJECTION, SOLUTION INTRAVENOUS; SUBCUTANEOUS at 13:06

## 2018-09-21 RX ADMIN — DICLOFENAC 2 G: 10 GEL TOPICAL at 09:25

## 2018-09-21 RX ADMIN — FLUTICASONE PROPIONATE 1 PUFF: 110 AEROSOL, METERED RESPIRATORY (INHALATION) at 21:40

## 2018-09-21 RX ADMIN — OXYCODONE HYDROCHLORIDE 5 MG: 5 TABLET ORAL at 18:09

## 2018-09-21 RX ADMIN — AMIODARONE HYDROCHLORIDE 200 MG: 200 TABLET ORAL at 08:55

## 2018-09-21 RX ADMIN — AMIODARONE HYDROCHLORIDE 200 MG: 200 TABLET ORAL at 18:09

## 2018-09-21 RX ADMIN — FUROSEMIDE 40 MG: 40 TABLET ORAL at 08:56

## 2018-09-21 RX ADMIN — INSULIN GLARGINE 12 UNITS: 100 INJECTION, SOLUTION SUBCUTANEOUS at 09:16

## 2018-09-21 RX ADMIN — ACETAMINOPHEN 975 MG: 325 TABLET ORAL at 21:40

## 2018-09-21 RX ADMIN — FLUTICASONE PROPIONATE 1 PUFF: 110 AEROSOL, METERED RESPIRATORY (INHALATION) at 09:25

## 2018-09-21 RX ADMIN — DICLOFENAC 2 G: 10 GEL TOPICAL at 21:40

## 2018-09-21 RX ADMIN — LISINOPRIL 10 MG: 10 TABLET ORAL at 08:55

## 2018-09-21 RX ADMIN — POTASSIUM CHLORIDE 40 MEQ: 1500 TABLET, EXTENDED RELEASE ORAL at 08:56

## 2018-09-21 RX ADMIN — VITAMIN D, TAB 1000IU (100/BT) 1000 UNITS: 25 TAB at 08:55

## 2018-09-21 RX ADMIN — ACETAMINOPHEN 975 MG: 325 TABLET ORAL at 05:57

## 2018-09-21 RX ADMIN — INSULIN LISPRO 1 UNITS: 100 INJECTION, SOLUTION INTRAVENOUS; SUBCUTANEOUS at 13:10

## 2018-09-21 NOTE — SOCIAL WORK
Cm received call from pt's son Lelia Conde states that pt keeps complaining about pain and that he spoke with the vascular team who  Were not helpful  Cm enquired about previous rehab facility for pt, per Lelia Amaya pt was at [facility] but he would like her to go somewhere where she can continue to get the treatments for her foot  Explained to Lelia Sortoine that we are trying to figure out a plan for pt as our acute rehab is unable to accept pt with HBO tx and the treatments would not be covered under a SNF stay if she were to go to a subacute rehab as well  Advised cm will f/u once we have a plan in place

## 2018-09-21 NOTE — OCCUPATIONAL THERAPY NOTE
Occupational Therapy Treatment Note       09/21/18 1441   Restrictions/Precautions   Weight Bearing Precautions Per Order Yes   RLE Weight Bearing Per Order WBAT  (with surgical shoe)   LLE Weight Bearing Per Order (BKA)   Other Precautions WBS; Chair Alarm; Fall Risk   Lifestyle   Autonomy Pt I w/ ADLs, IADLs, and mobility at baseline  Has DME but was not using it   Reciprocal Relationships Pt lives alone  She has supportive family nearby   Service to Others Retired   Intrinsic Gratification Enjoys racing cars   Pain Assessment   Pain Assessment 0-10   Pain Score 6   Pain Type Acute pain   ADL   Where Assessed Edge of bed   Grooming Assistance 4  Minimal Assistance   Grooming Deficit Brushing hair   Bed Mobility   Supine to Sit 4  Minimal assistance   Additional items HOB elevated; Increased time required   Transfers   Sit to Stand 4  Minimal assistance   Additional items Assist x 2   Stand to Sit 4  Minimal assistance   Additional items Assist x 2   Stand pivot 3  Moderate assistance   Additional items Assist x 2   Cognition   Overall Cognitive Status [facility]   Arousal/Participation Alert; Responsive   Attention Within functional limits   Orientation Level Oriented X4   Memory Within functional limits   Following Commands Follows one step commands with increased time or repetition   Activity Tolerance   Activity Tolerance Patient tolerated treatment well   Assessment   Assessment Pt participated in occupational therapy with focus on activity tolerance, bed mob, unsupported sitting balance and tolerance for pt engagement in functional self-care task/grooming, functional transfers/stand pivot transfers for pt future commode transfers  Pt cleared by RN/Cynthia for pt participation in therapy  Pt received HOB raised/supine pt sitting upright and sleeping soundly  Pt awake to name called and agreeable to therapy following pt Identifiers confirmed and min encouragement   Pt reported fatigue, became emotional and stated " If I had known how this surgery was going to go I would not have don it"  Pt then stated "I told my son, When I get home I am going to shoot myself" Pt nurse informed and was already aware that pt has been making these statements  Pt affect however did brighten as session progressed and 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  Pt able to tolerate sitting edge of pt bed for light grooming/combing hair however increased time required for pt to complete functional transfers/stand pivot transfers 2* pt decreased overall strength, coordination and balance  Pt will require in-pt rehab to continue to address pt above noted deficits which currently impair pt ADL and functional mob      Plan   Treatment Interventions ADL retraining;Patient/family training;Functional transfer training   Goal Expiration Date 10/03/18   Treatment Day 2   OT Frequency 3-5x/wk   Recommendation   OT Discharge Recommendation Short Term Rehab   Barthel Index   Feeding 10   Bathing 0   Grooming Score 0   Dressing Score 5   Bladder Score 0   Bowels Score 10   Toilet Use Score 5   Transfers (Bed/Chair) Score 5   Mobility (Level Surface) Score 0   Stairs Score 0   Barthel Index Score 35   Modified Rae Scale   Modified Forest Scale 4     Shanna PEARLA/L

## 2018-09-21 NOTE — SOCIAL WORK
Cm spoke with Yamel Beatty in Hyperbaric center  Pt has completed 4/10 treatments  Spoke with Micaela in [facility] and they would not be able to accept pt if she will need HBO treatments  Pt would not be able to have HBO covered if she goes to a SNF as well  Cm made vascular aware and will d/w podiatry

## 2018-09-21 NOTE — PROGRESS NOTES
Progress Note - Vascular Surgery   Magdiel Cruz [de-identified] y o  female MRN: [id number]  Unit/Bed#: [facility] 527-01 Encounter: [id number]    Assessment:  70-year-old female with severe PAD complicated by left and right foot gangrene    - S/P left plantar artery exploration-aborted due to no suitable bypass target 09/12/2018  - S/p left BKA 09/17/2018    Plan:  Local left BKA wound care  Continue anticoagulation on Xarelto  Monitor urine output  Per Podiatry-right foot gangrene hyperbaric O2  Antibiotics stopped  Discharge planning-placement for inpatient rehab    Subjective/Objective   Chief Complaint:     Subjective: Patient sleeping comfortably in bed  Minimal pain and discomfort to L BKA site, patient is able to assist in lifting extremity  No fevers, chills, nausea or vomiting, sob or chest pain  Objective:     Blood pressure 111/56, pulse 65, temperature 98 6 °F (37 °C), temperature source Oral, resp  rate 18, height 5' 4" (1 626 m), weight 68 kg (149 lb 14 6 oz), SpO2 95 %, not currently breastfeeding  ,Body mass index is 25 73 kg/m²  Intake/Output Summary (Last 24 hours) at 09/21/18 0544  Last data filed at 09/20/18 2200   Gross per 24 hour   Intake              840 ml   Output             1301 ml   Net             -461 ml       Invasive Devices     Peripheral Intravenous Line            Peripheral IV 09/17/18 Right; Lower Arm 3 days    Peripheral IV 09/18/18 Right Forearm 2 days          Drain            Urethral Catheter Latex 18 Fr  1 day                Physical Exam: General: AAOx3  Respiratory: BS b/l  Abdomen: Soft, NT, no rebound/guarding  Heart: RRR, S1s2  Ext: Warm no cyanosis   Left BKA -  Dressing c/d/i - dressing to be changed after rounds today    Lab, Imaging and other studies:I have personally reviewed pertinent lab results    , CBC: No results found for: WBC, HGB, HCT, MCV, PLT, ADJUSTEDWBC, MCH, MCHC, RDW, MPV, NRBC, CMP: No results found for: NA, K, CL, CO2, ANIONGAP, BUN, CREATININE, GLUCOSE, CALCIUM, AST, ALT, ALKPHOS, PROT, ALBUMIN, BILITOT, EGFR  VTE Pharmacologic Prophylaxis: Reason for no pharmacologic prophylaxis Xarelto  VTE Mechanical Prophylaxis: sequential compression device

## 2018-09-21 NOTE — CONSULTS
CONSULT    Patient Name: Bertis Councilman  Patient MRN: [id number]  Date of Service: 9/21/2018   Date / Time Note Created: 9/21/2018 11:20 AM   Referring Provider: Giovanni Ramachandran MD  Provider Creating Note: RAMO Alvarez    PCP: Ana Ellison  Attending Provider:  Giovanni Ramachandran MD    Reason for Consult: Urinary Retention    HPI --Ms Moore is an 27-year-old female with significant cardiac history admitted for gangrenous left lower extremity postop day 5 left below-the-knee amputation  Patient developed urinary retention requiring multiple intermittent catheterizations with significant urine yield  Patient denies any prior voiding symptoms at baseline including urinary urgency, frequency, dysuria, gross hematuria, flank or groin pain  Patient does endorse history of urinary incontinence  Now post indwelling urinary catheter placement  Urologic consultation is requested for Mccormick management      Source:chart review and the patient       Patient Active Problem List   Diagnosis    Essential hypertension    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 native heart without angina pectoris    Type 2 diabetes mellitus with complication, with long-term current use of insulin (HCC)    Atrial flutter (HCC)    Hyperlipidemia    Gastroesophageal reflux disease without esophagitis    Moderate mitral stenosis    Acute kidney injury ([address] )    Arthritis of hand    Asthma    Asymptomatic carotid artery stenosis, bilateral    Atherosclerosis of native artery of both lower extremities with bilateral ulceration ([address] )    Hx of CABG    Chronic anticoagulation    Chronic combined systolic and diastolic congestive heart failure (HCC)    Degenerative joint disease involving multiple joints    Diabetic retinopathy (HCC)    Postoperative hypothyroidism    Migraine headache    Nephrolithiasis    Osteoarthritis of both knees    Paroxysmal atrial fibrillation (HCC)    Ulcer of toe of left foot ([address] )    Ulcer of toe of right foot (HCC)    Prolonged Q-T interval on ECG    Hypokalemia    Hypocalcemia    Left bundle branch block (LBBB)    1st degree AV block    S/P TAVR (transcatheter aortic valve replacement)    Expressive aphasia    Multiple lacunar infarcts (HCC)    Vomiting    History of CVA (cerebrovascular accident)    [facility]    NSVT (nonsustained ventricular tachycardia) (HCC)    Pulmonary hypertension (HCC)    Anemia    Lactic acidosis    Hypomagnesemia    Bradycardia    Foot pain    PAD (peripheral artery disease) (HCC)    Cellulitis    CKD (chronic kidney disease) stage 3, GFR 30-59 ml/min    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)    Elevated INR    Hypothyroidism    Rotator cuff disorder, left    Wet gangrene (HCC)    Decubitus ulcer of sacral region, stage 3 (HCC)       Impressions  Post-Operative Urinary Retention (Multi-factorial) secondary to recent administration of anesthesia, narcotics, recumbency and constipation  Urinary retention may be chronic in light of patient's history of incontinence which may be related to overflow; in addition to diabetic neuropathy 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  Recommendations  Maintain Mccormick catheter to straight drainage  Not nursing managed  Do not remove catheter  Patient will require rehab  Encourage fluids (if not otherwise medically contraindicated, continue PT/OT, and prevent/treat constipation  Discharge with Mccormick in place and proceed with void trial 1 week in to rehab  Refer to AVS for Mccormick care and void trial instructions for skilled nursing to follow  No further  intervention indicated during this hospital stay  Our office will contact patient with non urgent outpatient follow up once rehab completed              Past Medical History:   Diagnosis Date    Altered mental status     Anemia     Anticoagulated     Xarelto for Afib/ flutter    Asthma     Atrial flutter ([facility] )     maintained on anticoag w/ Xarelto    Bradycardia     CAD (coronary artery disease)     Candidiasis     Carotid stenosis, bilateral     Cataract     Chest pain     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) [facility])     Chronic fatigue syndrome     Chronic low back pain     Chronic ulcer of toes ([facility])     left and right    Concussion w loss of consciousness of unsp duration, init     CVA (cerebral vascular accident) ([facility] ) 4/17/2018    Diabetes mellitus [facility]     type 2, insulin dependent    DJD (degenerative joint disease)     Expressive aphasia     Foot pain     GERD (gastroesophageal reflux disease)     Herpes zoster     HLD (hyperlipidemia)     HTN (hypertension)     Hypothyroidism     Irritable bowel syndrome     Kidney stone     Lumbar radiculopathy     Lyme disease     Dx in hospital 8/2011    MI (myocardial infarction) ([facility]     Migraines     Muscle spasm     Non-neoplastic nevus     Nontoxic multinodular goiter     NSVT (nonsustained ventricular tachycardia) ([facility])     Osteoarthritis     Other chronic pain     PAD (peripheral artery disease) ([facility])     Palpitations     Paroxysmal atrial fibrillation ([facility])     Pseudogout     Pulmonary hypertension ([facility])     S/P TAVR (transcatheter aortic valve replacement)     Seizures ([facility])     Severe sepsis ([facility]     Spinal stenosis     Stroke ([facility] 05/2018    Transient cerebral ischemia     Trigger ring finger     Viral gastroenteritis        Past Surgical History:   Procedure Laterality Date    APPENDECTOMY      ARTERIOGRAM  12/19/2017    CARDIAC CATHETERIZATION      CHOLECYSTECTOMY      COLONOSCOPY      CORONARY ARTERY BYPASS GRAFT  2004    IR ABDOMINAL ANGIOGRAPHY / INTERVENTION  8/10/2018    IR ABDOMINAL ANGIOGRAPHY / INTERVENTION  8/21/2018    LEG AMPUTATION THROUGH LOWER TIBIA AND FIBULA Left 9/17/2018    Procedure: AMPUTATION BELOW KNEE (BKA);   Surgeon: Ron Smith, MD;  [facility];  Service: Vascular    LUMBAR LAMINECTOMY      HI ECHO TRANSESOPHAG R-T 2D W/PRB IMG ACQUISJ I&R N/A 4/3/2018    Procedure: TRANSESOPHAGEAL ECHOCARDIOGRAM (ROBB); Surgeon: José Garay DO;  [facility];  Service: Cardiac Surgery    HI REPLACE AORTIC VALVE OPENFEMORAL ARTERY APPROACH N/A 4/3/2018    Procedure: REPLACEMENT AORTIC VALVE TRANSCATHETER (TAVR) TRANSFEMORAL w/ 26MM IVY YEVGENIY S3 VALVE;  Surgeon: José Garay DO;  [facility];  Service: Cardiac Surgery    HI VEIN BYPASS GRAFT,TIB-PERONEAL Left 9/12/2018    Procedure: Exploration of posterior tibial, medial, and lateral plantar arteries  ;  Surgeon: Lakshmi Alexander MD;  [facility];  Service: Vascular    THYROIDECTOMY      TOTAL ABDOMINAL HYSTERECTOMY W/ BILATERAL SALPINGOOPHORECTOMY         Family History   Problem Relation Age of Onset    Cancer Mother     Stroke Mother     Cancer Father     Heart disease Father     Breast cancer Sister     Diabetes Daughter         Passed away January 2017        Social History     Social History    Marital status:      Spouse name: N/A    Number of children: N/A    Years of education: N/A     Occupational History    Not on file       Social History Main Topics    Smoking status: Never Smoker    Smokeless tobacco: Never Used    Alcohol use No    Drug use: No    Sexual activity: Not on file     Other Topics Concern    Not on file     Social History Narrative    No narrative on file       Allergies   Allergen Reactions    Other Chest Pain     IVP-listed as "chest pain" in previous chart, patient stated this occurred "a long time ago" but does not remember exactly occurred     Contrast [Iodinated Diagnostic Agents] Other (See Comments)     Flash pulm edema    Doxycycline Chest Pain    Ketorolac Other (See Comments)     Chest pain     Levaquin [Levofloxacin] Chest Pain    Ondansetron Chest Pain     Prolonged QT    Toradol [Ketorolac Tromethamine] Chest Pain       Review of Systems  10 point review of systems negative except as noted in HPI     Chart Review   Allergies, current medications, history, problem list    Vital Signs & Physical Exam  General appearance: alert and oriented, in no acute distress  Head: Normocephalic, without obvious abnormality, atraumatic  Lungs: clear to auscultation bilaterally  Heart: regular rate and rhythm, S1, S2 normal, no murmur, click, rub or gallop  Abdomen: soft, non-tender; bowel sounds normal; no masses,  no organomegaly  Extremities:  L BKA  Pulses: 2+ and symmetric  Neurologic: Grossly normal  Mccormick patent for clear yellow urine     Laboratory Studies  Lab Results   Component Value Date    HGBA1C 6 4 (H) 09/08/2018    HGBA1C 8 3 (H) 12/16/2015     09/19/2018     10/14/2015    K 3 8 09/19/2018    K 4 6 10/14/2015     09/19/2018     10/14/2015    CO2 28 09/19/2018    CO2 28 10/14/2015    GLUCOSE 104 09/12/2018    GLUCOSE 119 10/14/2015    CREATININE 0 89 09/19/2018    CREATININE 0 70 10/14/2015    BUN 21 09/19/2018    BUN 18 10/14/2015    MG 2 0 06/20/2018    MG 1 2 (L) 02/10/2015    PHOS 3 8 06/20/2018               Imaging and Other Studies  )Xr Chest Portable    Result Date: 9/12/2018  Narrative: CHEST INDICATION:   central line placement  COMPARISON:  Chest radiograph September 8, 2018 EXAM PERFORMED/VIEWS:  XR CHEST PORTABLE  AP portable semierect view in the conventional and line placement techniques  Images: 2 FINDINGS: The heart is enlarged  Atherosclerotic changes in the aorta  Median sternotomy  Transcatheter aortic valve replacement  Right internal jugular central line with tip projecting into the right atrium  Lung volumes diminished  Paola and pulmonary vessels prominent  Old, healed right-sided rib fractures  Osseous structures appear within normal limits for patient age  Impression: 1  Diminished inspiration  Mild vascular congestion   2   Right internal jugular central line in satisfactory position  Workstation performed: [id number]     Xr Chest Pa & Lateral    Result Date: 9/8/2018  Narrative: CHEST INDICATION:   possible hyperbarics  COMPARISON:  8/8/2018 EXAM PERFORMED/VIEWS:  XR CHEST PA & LATERAL  The frontal view was performed utilizing dual energy radiographic technique  Images: 4 FINDINGS: Post median sternotomy and CABG  Aortic annuloplasty ring is noted  Heart shadow is enlarged but unchanged from prior exam   Mild pulmonary vascular congestion  The lungs are clear  No pneumothorax or pleural effusion  Osseous structures appear within normal limits for patient age  Impression: Mild pulmonary vascular congestion and cardiomegaly raise concern for CHF  Workstation performed: [id number]     Xr Shoulder 2+ Views Left    Result Date: 9/7/2018  Narrative: LEFT SHOULDER INDICATION:   shoulder pain x2 wks  COMPARISON:  None VIEWS:  XR SHOULDER 2+ VW LEFT FINDINGS: There is no acute fracture or dislocation  Joint spaces appear maintained  However, there is ossific density which projects between the humeral head and the acromion which seems to be a relatively large findings and is of uncertain etiology  It measures 7 x 18 mm  It might be a large bone spur  growing downward from the acromion process although other etiologies such as an intra-articular loose body cannot be excluded  Pilar Rey the former  This could be impinging upon rotator cuff structures  Probably warrants further workup with MRI  No lytic or blastic lesions are seen  Soft tissues are unremarkable  Impression: Relatively prominent ossific density projecting between acromion and humeral head felt to most likely be a large bone spur  This might be impinging on rotator cuff  Suggest further workup with MRI Workstation performed: [id number]     Xr Foot 3+ Vw Left    Result Date: 9/7/2018  Narrative: LEFT FOOT INDICATION:   Concern for wet gangrene   COMPARISON:  8/12/2018 VIEWS:  XR FOOT 3+ VW LEFT FINDINGS: Normal alignment of the left foot  Diffuse osteopenia  No evidence of acute fracture, lytic or blastic lesion  No erosive changes or periosteal reaction  Diffuse vascular calcifications  Mild swelling throughout the foot  No evidence of gas or collection  Impression: Diffuse swelling throughout the foot  No evidence of gas or collection  No radiographic evidence of osteomyelitis  Workstation performed: [id number]     Vas Lower Limb Venous Duplex Study, Unilateral/limited    Result Date: 8/24/2018  Narrative:  [facility] REPORT CLINICAL: Indications: Patient presents with Left lower extremity pain and swelling x 2 weeks  Known severe multi-level disease in Left calf (8/21/18 arteriography) Operative History: 2018-08-10 Right SFA and mid peroneal artery angioplasty CABG lower extremity a-gram 12/2017 Valve replacement April 2018 Risk Factors The patient has history of HTN, Diabetes (Yes), HLD, CKD, PAD, CAD and DVT  The patient current BMI is 27 63, Weight is 161 lb and height is 64 in  Clinical Right Pressure:  151/68 mm Hg  FINDINGS:  Left      DVT      Peroneal  Chronic     CONCLUSION: Impression: RIGHT LOWER LIMB LIMITED: Evaluation shows no evidence of thrombus in the common femoral vein  Doppler evaluation shows a normal response to augmentation maneuvers  LEFT LOWER LIMB: No evidence of acute deep vein thrombosis  Evidence of chronic non-occlusive thrombus seen in 1of the paired peroneal veins in proximal and distal calf  No evidence of superficial thrombophlebitis noted  Doppler evaluation shows a normal response to augmentation maneuvers    SIGNATURE: Electronically Signed by: Dion Lord MD, RPVI on 2018-08-24 01:27:58 PM        Medications   Scheduled Meds:  Current Facility-Administered Medications:  acetaminophen 975 mg Oral Q8H [facility] Lakia Alarcon PA-C   albuterol 1 puff Inhalation Q4H PRN Frandy Quinones MD   amiodarone 200 mg Oral BID Tomas Henderson Bry Bridges MD   amLODIPine 10 mg Oral Daily Jone Rea MD   atorvastatin 80 mg Oral Daily Jone Rea MD   bisacodyl 10 mg Oral Daily Davis Kinsey MD   bisacodyl 10 mg Rectal Daily PRN Pam Chatman PA-C   cholecalciferol 1,000 Units Oral Daily Jone Rea MD   diclofenac sodium 2 g Topical 4x Daily Davis Kinsey MD   fluticasone 1 puff Inhalation Q12H [address] Jone Rea MD   furosemide 40 mg Oral Daily Davis Kinsey MD   gabapentin 200 mg Oral BID Nasreen Montenegro PA-C   insulin glargine 12 Units Subcutaneous QAM Davis Kinsey MD   insulin glargine 8 Units Subcutaneous HS Davis Kinsey MD   insulin lispro 1-5 Units Subcutaneous TID AC Thelda Teagan, DO   insulin lispro 1-5 Units Subcutaneous HS Theldfawn Candelaria,    labetalol 5 mg Intravenous Q6H PRN Thelda Teagan,    levETIRAcetam 750 mg Oral Daily Jone Rea MD   levothyroxine 100 mcg Oral Early Morning Jone Rea MD   lisinopril 10 mg Oral Daily Davis Kinsey MD   magnesium oxide 400 mg Oral BID Jone Rea MD   methocarbamol 250 mg Oral Q6H PRN Pam Chatman PA-C   oxyCODONE 5 mg Oral Q4H PRN Davis Kinsey MD   polyethylene glycol 17 g Oral Daily Davis Kinsey MD   prochlorperazine 5 mg Oral Q6H PRN Rodolfo Teresa MD   rivaroxaban 20 mg Oral Daily With Breakfast Nasreen Montenegro PA-C   senna-docusate sodium 1 tablet Oral HS Davis Kinsey MD   sodium phosphate-biphosphate 1 enema Rectal Once PRN Nasreen Montenegro PA-C     Continuous Infusions:   PRN Meds: albuterol    bisacodyl    labetalol    methocarbamol    oxyCODONE    prochlorperazine    sodium phosphate-biphosphate      Total time spent with patient 25 minutes, >50% spent counseling and/or coordination of care           )RAMO Woods

## 2018-09-21 NOTE — PLAN OF CARE
Problem: PHYSICAL THERAPY ADULT  Goal: Performs mobility at highest level of function for planned discharge setting  See evaluation for individualized goals  Treatment/Interventions: Bed mobility, Gait training, Patient/family training, LE strengthening/ROM, Functional transfer training, Endurance training, Spoke to nursing, OT          See flowsheet documentation for full assessment, interventions and recommendations  Outcome: Progressing  Prognosis: Good  Problem List: Decreased strength, Decreased range of motion, Decreased endurance, Impaired balance, Decreased mobility, Decreased coordination, Decreased safety awareness, Impaired sensation, Decreased skin integrity, Orthopedic restrictions, Pain  Assessment: Pt participated in therapy session focusing on bed mobility and transfer techniuqe  Pt requires extended therapeutic rest breaks between activities secondary to pain and fatigue  Pt also required emotional support throughout session as pt became increasingly emotional when talking about her current medical condition  Pt performed bed mobility with min A and verbal cues  Pt performed sit to stand transfer with min A x 2 and RW and verbal cues for technique and safety  Pt performed stand pivot transfer from bed to bedside chair with RW and mod A x 2 and verbal cues for techniuqe and safety  Pt displays decreased ability to pivot on RLE despite cues  Provided pt educaiton on desensitization techniuqes, energy conservation techniques, role of physical therapy and importance of transferring out of bed into bedside chair, Skilled physical therapy is indicated to address listed functional deficits focusing on strength, endurance and functional mobility tasks  Barriers to Discharge: Inaccessible home environment, Decreased caregiver support     Recommendation: Post acute IP rehab     PT - OK to Discharge: Yes (to rehab when medically stable)    See flowsheet documentation for full assessment

## 2018-09-21 NOTE — PHYSICAL THERAPY NOTE
PHYSICAL THERAPY PROGRESS NOTE          Patient Name: Gina Ulloa  XGWRT'B Date: 9/21/2018 09/21/18 4480   Pain Assessment   Pain Assessment 0-10   Pain Score Worst Possible Pain   Pain Type Acute pain;Chronic pain;Surgical pain   Pain Location Buttocks; Foot   Pain Orientation Right;Left  (R foot, L residual limb)   Hospital Pain Intervention(s) Repositioned; Ambulation/increased activity; Emotional support   Response to Interventions unchanged   Restrictions/Precautions   Weight Bearing Precautions Per Order Yes   RLE Weight Bearing Per Order WBAT  (with surgical shoe)   LLE Weight Bearing Per Order (BKA)   Other Precautions Chair Alarm;WBS;Multiple lines; Fall Risk;Pain   General   Chart Reviewed Yes   Response to Previous Treatment Patient with no complaints from previous session  Family/Caregiver Present No   Cognition   Overall Cognitive Status WFL   Arousal/Participation Alert; Responsive   Attention Within functional limits   Orientation Level Oriented X4   Following Commands Follows one step commands with increased time or repetition   Subjective   Subjective "I never would have done this if I would have known I was going to have this pain"   Bed Mobility   Rolling L 4  Minimal assistance   Additional items HOB elevated; Increased time required;Verbal cues   Supine to Sit 3  Moderate assistance   Additional items HOB elevated; Increased time required;Verbal cues   Transfers   Sit to Stand 4  Minimal assistance   Additional items Assist x 2; Increased time required;Verbal cues   Stand to Sit 4  Minimal assistance   Additional items Assist x 2; Increased time required;Verbal cues   Stand pivot 3  Moderate assistance   Additional items Assist x 2; Increased time required;Verbal cues   Additional Comments Multiple sit to stand transfers performed from EOB with pt requiring min A x 2, pt required increased assistance for stand pivot transfer secondary to being unable to pivot on RLE   Ambulation/Elevation   Gait pattern Not tested   Balance   Static Sitting Good   Dynamic Sitting Fair +   Static Standing Fair   Dynamic Standing Poor +   Endurance Deficit   Endurance Deficit Yes   Endurance Deficit Description pain, fatigue, weakness   Activity Tolerance   Activity Tolerance Patient limited by fatigue;Patient limited by pain   Nurse Made Aware yes   Exercises   Balance training  EOB static and dynamic balance activities focusing on weight shifting, moving hips forward in bed, off-weighting to the L and R, total time spent at EOB 15 min unsupported and 15 min supported with BUE   Assessment   Prognosis Good   Problem List Decreased strength;Decreased range of motion;Decreased endurance; Impaired balance;Decreased mobility; Decreased coordination;Decreased safety awareness; Impaired sensation;Decreased skin integrity;Orthopedic restrictions;Pain   Assessment Pt participated in therapy session focusing on bed mobility and transfer techniuqe  Pt requires extended therapeutic rest breaks between activities secondary to pain and fatigue  Pt also required emotional support throughout session as pt became increasingly emotional when talking about her current medical condition  Pt performed bed mobility with min A and verbal cues  Pt performed sit to stand transfer with min A x 2 and RW and verbal cues for technique and safety  Pt performed stand pivot transfer from bed to bedside chair with RW and mod A x 2 and verbal cues for techniuqe and safety  Pt displays decreased ability to pivot on RLE despite cues  Provided pt educaiton on desensitization techniuqes, energy conservation techniques, role of physical therapy and importance of transferring out of bed into bedside chair, Skilled physical therapy is indicated to address listed functional deficits focusing on strength, endurance and functional mobility tasks     Barriers to Discharge Inaccessible home environment;Decreased caregiver support   Goals   Patient Goals have less pain   STG Expiration Date 10/01/18   Short Term Goal #1 Per PT eval 9/19: 1  Complete bed mobility with S to decrease need for caregiver support  2  Improve balance to good to decrease risk of falls during transfer  3  Complete sit pivot with S to improve I  4  Complete stand pivot with Crissy  5  I with W/C management x 150 to complete community mobility with I  6  Demonstrate ability to hop 10' with RW and Mod A  Treatment Day 1   Plan   Treatment/Interventions Functional transfer training; Endurance training;Cognitive reorientation;Patient/family training;Equipment eval/education; Bed mobility; Compensatory technique education;Spoke to nursing;OT   Progress Progressing toward goals   PT Frequency (3-5x/wk)   Recommendation   Recommendation Post acute IP rehab   PT - OK to Discharge Yes  (to rehab when medically stable)     Michael Soto, PTA

## 2018-09-21 NOTE — PROGRESS NOTES
[facility] - Internal Medicine Progress Note      PATIENT INFORMATION      Patient: Jonas Reed [de-identified] y o  female   MRN: [id number]  PCP: Latricia Mendoza MD  Unit/Bed#: [facility] 527-01 Encounter: [id number]  Date Of Visit: 18  Current Length of Stay: 14 day(s)     ASSESSMENTS & PLAN      1   Severe peripheral vascular disease with Left Great Toe Dry Gangrene s/p left BKA  Involving both feet but mainly left great toe   Dry gangrene with concern for wet gangrene of left great toe      · Plan per Podiatry/Vascular  · Wound culture of little utility, likely colonization   Previously evaluated by Infectious Disease  No need for Abx  · Analgesia:  Plan per Acute Pain Service     2   History of DVT/PE  · Xarelto 20mg daily  · Hold SCDs in setting of chronic lower extremity wounds and BKA     3   Coronary artery disease status post CABG  · Continue atorvastatin/aspirin     4   Paroxysmal AFib  · Continue amiodarone 20 mg b i d  rate control  · Anticoagulation as above     5   Chronic systolic heart failure (without exacerbation)  · Cont Lasix       6   Elevated INR secondary to Xarelto  · Anticoagulation as above     7   Hypertension  · Continue Amlodipine 10mg daily  · Restarted lisinopril 10mg  · IV labetalol 5 mg q 6 hours p r n      8   Acute kidney injury  · Resolved     9   Type 2 diabetes mellitus  · Lantus to 12 units a m  and 8 units p m  10   Stage 3 hospital acquired Decubitus   · Turn and reposition  · Wound care    VTE Pharmacologic Prophylaxis: Xarelto  VTE Mechanical Prophylaxis: SCD's    Disposition: waiting placement       SUBJECTIVE     Continues to complain about pain  Patient denies chest pain, palpitations, SOB, cough, abdominal pain, nausea, vomiting, constipation, diarrhea, fevers/chills, headaches, dysuria          OBJECTIVE     Vitals:   Temp (24hrs), Av 3 °F (36 8 °C), Min:97 7 °F (36 5 °C), Max:98 6 °F (37 °C)    HR:  [62-65] 64  Resp:  [18] 18  BP: (111-150)/(56-68) 150/68  SpO2:  [93 %-95 %] 94 %  Body mass index is 25 66 kg/m²  Input and Output Summary (last 24 hours): Intake/Output Summary (Last 24 hours) at 09/21/18 0816  Last data filed at 09/21/18 4521   Gross per 24 hour   Intake              840 ml   Output             1451 ml   Net             -611 ml       Physical Exam:   GENERAL: NAD  HEENT:  NC/AT, PERRL, EOMI, MMM, no scleral icterus  CARDIAC:  RRR, +S1/S2, no S3/S4 heard, no m/g/r  PULMONARY:  CTA B/L, no wheezing/rales/rhonci, non-labored breathing  ABDOMEN:  Soft, NT/ND, +BS, no rebound/guarding/rigidity            ADDITIONAL DATA     Labs & Recent Cultures:       Results from last 7 days  Lab Units 09/19/18  0344  09/17/18  0505   WBC Thousand/uL 11 42*  < > 10 61*   HEMOGLOBIN g/dL 8 9*  < > 10 7*   HEMATOCRIT % 28 6*  < > 34 4*   PLATELETS Thousands/uL 236  < > 282   NEUTROS PCT %  --   --  72   LYMPHS PCT %  --   --  14   MONOS PCT %  --   --  11   EOS PCT %  --   --  2   < > = values in this interval not displayed      Results from last 7 days  Lab Units 09/19/18  0344   SODIUM mmol/L 137   POTASSIUM mmol/L 3 8   CHLORIDE mmol/L 103   CO2 mmol/L 28   BUN mg/dL 21   CREATININE mg/dL 0 89   CALCIUM mg/dL 8 0*       Results from last 7 days  Lab Units 09/18/18  0446   INR  1 28*                 Last 24 Hours Medication List:     Current Facility-Administered Medications:  acetaminophen 975 mg Oral Q8H [facility] Nasreen Montenegro PA-C   albuterol 1 puff Inhalation Q4H PRN Gena Anderson MD   amiodarone 200 mg Oral BID Gena Anderson MD   amLODIPine 10 mg Oral Daily Gena Anderson MD   atorvastatin 80 mg Oral Daily Gena Anderson MD   bisacodyl 10 mg Oral Daily Davis Kinsey MD   bisacodyl 10 mg Rectal Daily PRN Zelia Meckel, PA-C   cholecalciferol 1,000 Units Oral Daily Gena Anderson MD   diclofenac sodium 2 g Topical 4x Daily Davis Kinsey MD   fluticasone 1 puff Inhalation Q12H [facility] Gena Anderson MD   furosemide 40 mg Oral Daily Davis Kinsey MD   gabapentin 200 mg Oral BID Pam Chatman PA-C   insulin glargine 12 Units Subcutaneous QAM Davis Kinsey MD   insulin glargine 8 Units Subcutaneous HS Davis Kinsey MD   insulin lispro 1-5 Units Subcutaneous TID AC Thelda Bridges, DO   insulin lispro 1-5 Units Subcutaneous HS Thelda Bridges, DO   labetalol 5 mg Intravenous Q6H PRN Thelda Bridges, DO   levETIRAcetam 750 mg Oral Daily Jone Rea MD   levothyroxine 100 mcg Oral Early Morning Jone Rea MD   lisinopril 10 mg Oral Daily Davis Kinsey MD   magnesium oxide 400 mg Oral BID Jone Rea MD   methocarbamol 250 mg Oral Q6H PRN Jhoana Montenegro PA-C   oxyCODONE 5 mg Oral Q4H PRN Davis Kinsey MD   polyethylene glycol 17 g Oral Daily Davis Kinsey MD   potassium chloride 40 mEq Oral Once Davis Kinsey MD   prochlorperazine 5 mg Oral Q6H PRN Rodolfo Teersa MD   rivaroxaban 20 mg Oral Daily With Breakfast Nasreen Montenegro PA-C   senna-docusate sodium 1 tablet Oral HS Davis Kinsey MD   sodium phosphate-biphosphate 1 enema Rectal Once PRN Pam Chatman PA-C          Time Spent for Care: 30 mins spent in total   More than 50% of total time spent on counseling and coordination of care as described above  Current Length of Stay: 14 day(s)      Code Status: Level 3 - DNAR and DNI          ** Please Note: This note is constructed using a voice recognition dictation system   **

## 2018-09-21 NOTE — TELEPHONE ENCOUNTER
Contacted patient's son, Julieth Coelho, advised him that we don't have much pull when it comes to inpatient care  Advised that he should reach out to the [address] [place] [address] for assistance and if he is not satisfied he should request assistance form a patient advocate to help with her care while she is there

## 2018-09-21 NOTE — CONSULTS
Consultation - [address] Samir Moore [de-identified] y o  female MRN: [id number]  Unit/Bed#: [facility] 527-01 Encounter: [id number]      Chief Complaint:  "I do not want to be a burden in anyone"    History of Present Illness   Physician Requesting Consult: Giovanni Ramachandran MD  Reason for Consult / Principal Problem:  Peripheral artery disease, evaluation for depression and suicidal ideation    Bertis Councilman is a [de-identified] y o  female with a medical history of 3 myocardial infarction, status post 6 vessel bypass, multiple CVAs , pAF , hyperlipidemia, peripheral artery disease, short-term memory loss, chronic anemia presents with swelling on her left 1st toe  She have a left BKA  Patient had been in the hospital for the last 14 days she has been having pain and she is very upset that she cannot do things that she used to do in the past   She states that she does not want to be a burden on  her children despite that they told her that she will never be a burden for them  She states that she will not be able to go home and do some garden  She also states when she had the pain she feels depressed and at the moment is when that she wants to die  She states that she never had feel that way before  She denies ever seen a psychiatrist or a therapist or taking any psychotropic medications  She states that her appetite is poor, she also have a sleep disturbances secondary to having intermittent pain  She talks about her children and grandchildren's and states that they are very supportive           Psychiatric Review Of Systems:  sleep: yes  appetite changes: yes  weight changes: no  energy/anergy: no  interest/pleasure/anhedonia: no  somatic symptoms: no  anxiety/panic: no  abner: no  guilty/hopeless: no  self injurious behavior/risky behavior: no    Historical Information   Past Psychiatric History:   None  Currently in treatment with none  Past Suicide attempts:  None  Past Violent behavior:  None  Past Psychiatric medication trial:  None    Substance Abuse History:  No drugs or alcohol history     I have assessed this patient for substance use within the past 12 months     History of IP/OP rehabilitation program:  None  Smoking history:  Never smoked  Family Psychiatric History:   Her paternal uncle completed suicide    Social History  Education: high school diploma/GED  Learning Disabilities: None  Marital history:   Living arrangement, social support: She says she live alone she has lot of support from her children  Occupational History: retired  Functioning Relationships: good support system    Other Pertinent History: None    Traumatic History:   Abuse: None  Other Traumatic Events: None    Past Medical History:   Diagnosis Date    Altered mental status     Anemia     Anticoagulated     Xarelto for Afib/ flutter    Asthma     Atrial flutter (HCC)     maintained on anticoag w/ Xarelto    Bradycardia     CAD (coronary artery disease)     Candidiasis     Carotid stenosis, bilateral     Cataract     Chest pain     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) ([facility])     Chronic fatigue syndrome     Chronic low back pain     Chronic ulcer of toes ([facility])     left and right    Concussion w loss of consciousness of unsp duration, init     CVA (cerebral vascular accident) ([facility] ) 4/17/2018    Diabetes mellitus ([facility])     type 2, insulin dependent    DJD (degenerative joint disease)     Expressive aphasia     Foot pain     GERD (gastroesophageal reflux disease)     Herpes zoster     HLD (hyperlipidemia)     HTN (hypertension)     Hypothyroidism     Irritable bowel syndrome     Kidney stone     Lumbar radiculopathy     Lyme disease     Dx in hospital 8/2011    MI (myocardial infarction) ([facility] )     Migraines     Muscle spasm     Non-neoplastic nevus     Nontoxic multinodular goiter     NSVT (nonsustained ventricular tachycardia) ([facility])     Osteoarthritis     Other chronic pain     PAD (peripheral artery disease) (HCC)     Palpitations     Paroxysmal atrial fibrillation (HCC)     Pseudogout     Pulmonary hypertension (HCC)     S/P TAVR (transcatheter aortic valve replacement)     Seizures (HCC)     Severe sepsis (HCC)     Spinal stenosis     St[facility] 05/2018    Transient cerebral ischemia     Trigger ring finger     Viral gastroenteritis        Medical Review Of Systems:  Review of Systems - Negative except shoulder pain, leg pain, foot pain in the right side, buttocks pain, all other systems reviewed were negative    Meds/Allergies   current meds:   Current Facility-Administered Medications   Medication Dose Route Frequency    acetaminophen (TYLENOL) tablet 975 mg  975 mg Oral Q8H [address]    albuterol (PROVENTIL HFA,VENTOLIN HFA) inhaler 1 puff  1 puff Inhalation Q4H PRN    amiodarone tablet 200 mg  200 mg Oral BID    amLODIPine (NORVASC) tablet 10 mg  10 mg Oral Daily    atorvastatin (LIPITOR) tablet 80 mg  80 mg Oral Daily    bisacodyl (DULCOLAX) EC tablet 10 mg  10 mg Oral Daily    bisacodyl (DULCOLAX) rectal suppository 10 mg  10 mg Rectal Daily PRN    cholecalciferol (VITAMIN D3) tablet 1,000 Units  1,000 Units Oral Daily    diclofenac sodium (VOLTAREN) 1 % topical gel 2 g  2 g Topical 4x Daily    fluticasone (FLOVENT HFA) 110 MCG/ACT inhaler 1 puff  1 puff Inhalation Q12H [address]    furosemide (LASIX) tablet 40 mg  40 mg Oral Daily    gabapentin (NEURONTIN) capsule 200 mg  200 mg Oral BID    insulin glargine (LANTUS) subcutaneous injection 12 Units 0 12 mL  12 Units Subcutaneous QAM    insulin glargine (LANTUS) subcutaneous injection 8 Units 0 08 mL  8 Units Subcutaneous HS    insulin lispro (HumaLOG) 100 units/mL subcutaneous injection 1-5 Units  1-5 Units Subcutaneous TID AC    insulin lispro (HumaLOG) 100 units/mL subcutaneous injection 1-5 Units  1-5 Units Subcutaneous HS    labetalol (NORMODYNE) injection 5 mg  5 mg Intravenous Q6H PRN    levETIRAcetam (KEPPRA) tablet 750 mg  750 mg Oral Daily    levothyroxine tablet 100 mcg  100 mcg Oral Early Morning    lisinopril (ZESTRIL) tablet 10 mg  10 mg Oral Daily    magnesium oxide (MAG-OX) tablet 400 mg  400 mg Oral BID    methocarbamol (ROBAXIN) tablet 250 mg  250 mg Oral Q6H PRN    oxyCODONE (ROXICODONE) IR tablet 5 mg  5 mg Oral Q4H PRN    polyethylene glycol (MIRALAX) packet 17 g  17 g Oral Daily    prochlorperazine (COMPAZINE) tablet 5 mg  5 mg Oral Q6H PRN    rivaroxaban (XARELTO) tablet 20 mg  20 mg Oral Daily With Breakfast    senna-docusate sodium (SENOKOT S) 8 6-50 mg per tablet 1 tablet  1 tablet Oral HS    sodium phosphate-biphosphate (FLEET) enema 1 enema  1 enema Rectal Once PRN     Allergies   Allergen Reactions    Other Chest Pain     IVP-listed as "chest pain" in previous chart, patient stated this occurred "a long time ago" but does not remember exactly occurred     Contrast [Iodinated Diagnostic Agents] Other (See Comments)     Flash pulm edema    Doxycycline Chest Pain    Ketorolac Other (See Comments)     Chest pain     Levaquin [Levofloxacin] Chest Pain    Ondansetron Chest Pain     Prolonged QT    Toradol [Ketorolac Tromethamine] Chest Pain       Objective   Vital signs in last 24 hours:  Temp:  [98 6 °F (37 °C)] 98 6 °F (37 °C)  HR:  [64-65] 64  Resp:  [18] 18  BP: (111-150)/(56-68) 150/68      Intake/Output Summary (Last 24 hours) at 09/21/18 1507  Last data filed at 09/21/18 0900   Gross per 24 hour   Intake              710 ml   Output              150 ml   Net              560 ml       Mental Status Evaluation:  Appearance:  age appropriate   Behavior:  normal   Speech:  normal pitch and normal volume   Mood:  euthymic   Affect:  mood-congruent   Language: naming objects and repeating phrases   Thought Process:  goal directed   Associations: intact associations   Thought Content:  normal   Perceptual Disturbances: None   Risk Potential: I do not want to be a burden for my children   Sensorium:  person, place and time/date   Memory:  recent and remote memory grossly intact   Cognition:  grossly intact   Consciousness:  alert and awake    Attention: attention span and concentration were age appropriate   Intellect: within normal limits   Fund of Knowledge: awareness of current events: Fair, past history: Fair and vocabulary: Fair   Insight:  fair   Judgment: fair   Muscle Strength and Tone: Within normal limits   Gait/Station: Unable to assess patient is in the bed   Motor Activity: no abnormal movements     Lab Results:    Lab Results   Component Value Date    WBC 11 42 (H) 09/19/2018    HGB 8 9 (L) 09/19/2018    HCT 28 6 (L) 09/19/2018    MCV 85 09/19/2018     09/19/2018     Lab Results   Component Value Date    GLUCOSE 104 09/12/2018    CALCIUM 8 0 (L) 09/19/2018     09/19/2018    K 3 8 09/19/2018    CO2 28 09/19/2018     09/19/2018    BUN 21 09/19/2018    CREATININE 0 89 09/19/2018         Code Status: )Level 3 - DNAR and DNI    Assessment/Plan     Assessment:  Taylor Sanford is a [de-identified] y o  female with multiple medical problems who was admitted to the hospital with swelling of her left 1st toe  Left BKA  She states that she had been in lot of pain and she thinks that is better to be dead than continue suffering and been a burden  to her children's  She does not have any access to gun  She talks about her children and states they are very supportive  She states that she is upset because she had intermittent pain that is affecting her and she is not able to do what she was doing before     Diagnosis:  Adjustment disorder with depressed mood  Plan:   Continue medical management  Control patient pain  I will follow up  At this moment patient does not need any one-to-one  Patient is not interested in  any medication at this time  Risks, benefits and possible side effects of Medications:   No medication given at this moment    Eliceo Rangel MD

## 2018-09-21 NOTE — PLAN OF CARE
Problem: OCCUPATIONAL THERAPY ADULT  Goal: Performs self-care activities at highest level of function for planned discharge setting  See evaluation for individualized goals  Treatment Interventions: ADL retraining, Functional transfer training, Endurance training, Patient/family training, Equipment evaluation/education, Compensatory technique education, Energy conservation          See flowsheet documentation for full assessment, interventions and recommendations  Outcome: Progressing  Limitation: Decreased ADL status, Decreased endurance, Decreased self-care trans, Decreased high-level ADLs  Prognosis: Good  Assessment: Pt participated in occupational therapy with focus on activity tolerance, bed mob, unsupported sitting balance and tolerance for pt engagement in functional self-care task/grooming, functional transfers/stand pivot transfers for pt future commode transfers  Pt cleared by RN/Cynthia for pt participation in therapy  Pt received HOB raised/supine pt sitting upright and sleeping soundly  Pt awake to name called and agreeable to therapy following pt Identifiers confirmed and min encouragement  Pt reported fatigue, became emotional and stated " If I had known how this surgery was going to go I would not have don it"  Pt then stated "I told my son, When I get home I am going to shoot myself" Pt nurse informed and was already aware that pt has been making these statements  Pt affect however did brighten as session progressed and 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

## 2018-09-21 NOTE — SOCIAL WORK
Cm received call from podiatry team covering for Dr Lary Baldiwn  Discussed information that was obtained regarding pt not being able to go to rehab if she is receiving hyperbaric treatments  Per Podiatry will escalate issue to Director

## 2018-09-21 NOTE — PLAN OF CARE
Problem: Prexisting or High Potential for Compromised Skin Integrity  Goal: Skin integrity is maintained or improved  INTERVENTIONS:  - Identify patients at risk for skin breakdown  - Assess and monitor skin integrity  - Assess and monitor nutrition and hydration status  - Monitor labs (i e  albumin)  - Assess for incontinence   - Turn and reposition patient  - Assist with mobility/ambulation  - Relieve pressure over bony prominences  - Avoid friction and shearing  - Provide appropriate hygiene as needed including keeping skin clean and dry  - Evaluate need for skin moisturizer/barrier cream  - Collaborate with interdisciplinary team (i e  Nutrition, Rehabilitation, etc )   - Patient/family teaching   Outcome: Progressing      Problem: Nutrition/Hydration-ADULT  Goal: Nutrient/Hydration intake appropriate for improving, restoring or maintaining nutritional needs  Monitor and assess patient's nutrition/hydration status for malnutrition (ex- brittle hair, bruises, dry skin, pale skin and conjunctiva, muscle wasting, smooth red tongue, and disorientation)  Collaborate with interdisciplinary team and initiate plan and interventions as ordered  Monitor patient's weight and dietary intake as ordered or per policy  Utilize nutrition screening tool and intervene per policy  Determine patient's food preferences and provide high-protein, high-caloric foods as appropriate       INTERVENTIONS:  - Monitor oral intake, urinary output, labs, and treatment plans  - Assess nutrition and hydration status and recommend course of action  - Evaluate amount of meals eaten  - Assist patient with eating if necessary   - Allow adequate time for meals  - Recommend/ encourage appropriate diets, oral nutritional supplements, and vitamin/mineral supplements  - Order, calculate, and assess calorie counts as needed  - Recommend, monitor, and adjust tube feedings and TPN/PPN based on assessed needs  - Assess need for intravenous fluids  - Provide specific nutrition/hydration education as appropriate  - Include patient/family/caregiver in decisions related to nutrition   Outcome: Progressing      Problem: DISCHARGE PLANNING - CARE MANAGEMENT  Goal: Discharge to post-acute care or home with appropriate resources  INTERVENTIONS:  - Conduct assessment to determine patient/family and health care team treatment goals, and need for post-acute services based on payer coverage, community resources, and patient preferences, and barriers to discharge  - Address psychosocial, clinical, and financial barriers to discharge as identified in assessment in conjunction with the patient/family and health care team  - Arrange appropriate level of post-acute services according to patient's   needs and preference and payer coverage in collaboration with the physician and health care team  - Communicate with and update the patient/family, physician, and health care team regarding progress on the discharge plan  - Arrange appropriate transportation to post-acute venues   Outcome: Progressing      Problem: METABOLIC, FLUID AND ELECTROLYTES - ADULT  Goal: Fluid balance maintained  INTERVENTIONS:  - Monitor labs and assess for signs and symptoms of volume excess or deficit  - Monitor I/O and WT  - Instruct patient on fluid and nutrition as appropriate   Outcome: Progressing    Goal: Glucose maintained within target range  INTERVENTIONS:  - Monitor Blood Glucose as ordered  - Assess for signs and symptoms of hyperglycemia and hypoglycemia  - Administer ordered medications to maintain glucose within target range  - Assess nutritional intake and initiate nutrition service referral as needed   Outcome: Progressing      Problem: SKIN/TISSUE INTEGRITY - ADULT  Goal: Skin integrity remains intact  INTERVENTIONS  - Identify patients at risk for skin breakdown  - Assess and monitor skin integrity  - Assess and monitor nutrition and hydration status  - Monitor labs (i e  albumin)  - Assess for incontinence   - Turn and reposition patient  - Assist with mobility/ambulation  - Relieve pressure over bony prominences  - Avoid friction and shearing  - Provide appropriate hygiene as needed including keeping skin clean and dry  - Evaluate need for skin moisturizer/barrier cream  - Collaborate with interdisciplinary team (i e  Nutrition, Rehabilitation, etc )   - Patient/family teaching   Outcome: Progressing    Goal: Incision(s), wounds(s) or drain site(s) healing without S/S of infection  INTERVENTIONS  - Assess and document risk factors for skin impairment   - Assess and document dressing, incision, wound bed, drain sites and surrounding tissue  - Initiate Nutrition services consult and/or wound management as needed   Outcome: Progressing    Goal: Oral mucous membranes remain intact  INTERVENTIONS  - Assess oral mucosa and hygiene practices  - Implement preventative oral hygiene regimen  - Implement oral medicated treatments as ordered  - Initiate Nutrition services referral as needed   Outcome: Progressing

## 2018-09-21 NOTE — PROGRESS NOTES
Spoke to [facility] resident about patient's pain  Patient states, "tylenol and oxy don't do anything for my pain"  Resident doesn't want dilaudid ordered at this time  Patient made aware  Will attempt nonpharmacological methods  Resident also made aware of patient's statement, "I'm just going to go home and shoot myself"  Patient stated she has been having suicidal thoughts since after the BKA surgery was performed  Will continue to monitor patient closely  Awaiting psych order at this time

## 2018-09-22 LAB
ANION GAP SERPL CALCULATED.3IONS-SCNC: 3 MMOL/L (ref 4–13)
BUN SERPL-MCNC: 26 MG/DL (ref 5–25)
CALCIUM SERPL-MCNC: 8.1 MG/DL (ref 8.3–10.1)
CHLORIDE SERPL-SCNC: 104 MMOL/L (ref 100–108)
CO2 SERPL-SCNC: 29 MMOL/L (ref 21–32)
CREAT SERPL-MCNC: 0.78 MG/DL (ref 0.6–1.3)
ERYTHROCYTE [DISTWIDTH] IN BLOOD BY AUTOMATED COUNT: 20.7 % (ref 11.6–15.1)
GFR SERPL CREATININE-BSD FRML MDRD: 72 ML/MIN/1.73SQ M
GLUCOSE SERPL-MCNC: 100 MG/DL (ref 65–140)
GLUCOSE SERPL-MCNC: 102 MG/DL (ref 65–140)
GLUCOSE SERPL-MCNC: 135 MG/DL (ref 65–140)
GLUCOSE SERPL-MCNC: 151 MG/DL (ref 65–140)
GLUCOSE SERPL-MCNC: 218 MG/DL (ref 65–140)
HCT VFR BLD AUTO: 30.1 % (ref 34.8–46.1)
HGB BLD-MCNC: 9.3 G/DL (ref 11.5–15.4)
MCH RBC QN AUTO: 26.6 PG (ref 26.8–34.3)
MCHC RBC AUTO-ENTMCNC: 30.9 G/DL (ref 31.4–37.4)
MCV RBC AUTO: 86 FL (ref 82–98)
PLATELET # BLD AUTO: 239 THOUSANDS/UL (ref 149–390)
PMV BLD AUTO: 10.3 FL (ref 8.9–12.7)
POTASSIUM SERPL-SCNC: 4.2 MMOL/L (ref 3.5–5.3)
RBC # BLD AUTO: 3.5 MILLION/UL (ref 3.81–5.12)
SODIUM SERPL-SCNC: 136 MMOL/L (ref 136–145)
WBC # BLD AUTO: 8.96 THOUSAND/UL (ref 4.31–10.16)

## 2018-09-22 PROCEDURE — 85027 COMPLETE CBC AUTOMATED: CPT | Performed by: PHYSICIAN ASSISTANT

## 2018-09-22 PROCEDURE — 82948 REAGENT STRIP/BLOOD GLUCOSE: CPT

## 2018-09-22 PROCEDURE — 99024 POSTOP FOLLOW-UP VISIT: CPT | Performed by: SURGERY

## 2018-09-22 PROCEDURE — 97530 THERAPEUTIC ACTIVITIES: CPT

## 2018-09-22 PROCEDURE — 80048 BASIC METABOLIC PNL TOTAL CA: CPT | Performed by: PHYSICIAN ASSISTANT

## 2018-09-22 RX ORDER — ACETAMINOPHEN 325 MG/1
650 TABLET ORAL EVERY 6 HOURS SCHEDULED
Status: DISCONTINUED | OUTPATIENT
Start: 2018-09-22 | End: 2018-09-24 | Stop reason: HOSPADM

## 2018-09-22 RX ORDER — OXYCODONE HYDROCHLORIDE 10 MG/1
10 TABLET ORAL
Status: DISCONTINUED | OUTPATIENT
Start: 2018-09-22 | End: 2018-09-24 | Stop reason: HOSPADM

## 2018-09-22 RX ORDER — OXYCODONE HYDROCHLORIDE 5 MG/1
5 TABLET ORAL
Status: DISCONTINUED | OUTPATIENT
Start: 2018-09-22 | End: 2018-09-24 | Stop reason: HOSPADM

## 2018-09-22 RX ADMIN — AMIODARONE HYDROCHLORIDE 200 MG: 200 TABLET ORAL at 17:00

## 2018-09-22 RX ADMIN — INSULIN LISPRO 2 UNITS: 100 INJECTION, SOLUTION INTRAVENOUS; SUBCUTANEOUS at 12:02

## 2018-09-22 RX ADMIN — DICLOFENAC 2 G: 10 GEL TOPICAL at 17:00

## 2018-09-22 RX ADMIN — GABAPENTIN 200 MG: 100 CAPSULE ORAL at 08:58

## 2018-09-22 RX ADMIN — DICLOFENAC 2 G: 10 GEL TOPICAL at 09:00

## 2018-09-22 RX ADMIN — OXYCODONE HYDROCHLORIDE 10 MG: 10 TABLET ORAL at 12:02

## 2018-09-22 RX ADMIN — MAGNESIUM OXIDE TAB 400 MG (241.3 MG ELEMENTAL MG) 400 MG: 400 (241.3 MG) TAB at 08:58

## 2018-09-22 RX ADMIN — DICLOFENAC 2 G: 10 GEL TOPICAL at 21:37

## 2018-09-22 RX ADMIN — OXYCODONE HYDROCHLORIDE 5 MG: 5 TABLET ORAL at 09:07

## 2018-09-22 RX ADMIN — METHOCARBAMOL 250 MG: 500 TABLET ORAL at 10:07

## 2018-09-22 RX ADMIN — INSULIN LISPRO 1 UNITS: 100 INJECTION, SOLUTION INTRAVENOUS; SUBCUTANEOUS at 16:49

## 2018-09-22 RX ADMIN — AMLODIPINE BESYLATE 10 MG: 10 TABLET ORAL at 08:59

## 2018-09-22 RX ADMIN — LISINOPRIL 10 MG: 10 TABLET ORAL at 08:58

## 2018-09-22 RX ADMIN — ACETAMINOPHEN 650 MG: 325 TABLET, FILM COATED ORAL at 12:01

## 2018-09-22 RX ADMIN — VITAMIN D, TAB 1000IU (100/BT) 1000 UNITS: 25 TAB at 08:59

## 2018-09-22 RX ADMIN — DICLOFENAC 2 G: 10 GEL TOPICAL at 12:02

## 2018-09-22 RX ADMIN — ACETAMINOPHEN 650 MG: 325 TABLET, FILM COATED ORAL at 17:00

## 2018-09-22 RX ADMIN — FUROSEMIDE 40 MG: 40 TABLET ORAL at 09:00

## 2018-09-22 RX ADMIN — GABAPENTIN 200 MG: 100 CAPSULE ORAL at 17:00

## 2018-09-22 RX ADMIN — FLUTICASONE PROPIONATE 1 PUFF: 110 AEROSOL, METERED RESPIRATORY (INHALATION) at 09:00

## 2018-09-22 RX ADMIN — LEVETIRACETAM 750 MG: 750 TABLET, FILM COATED ORAL at 08:59

## 2018-09-22 RX ADMIN — RIVAROXABAN 20 MG: 20 TABLET, FILM COATED ORAL at 08:59

## 2018-09-22 RX ADMIN — OXYCODONE HYDROCHLORIDE 10 MG: 10 TABLET ORAL at 23:35

## 2018-09-22 RX ADMIN — INSULIN GLARGINE 12 UNITS: 100 INJECTION, SOLUTION SUBCUTANEOUS at 09:10

## 2018-09-22 RX ADMIN — MAGNESIUM OXIDE TAB 400 MG (241.3 MG ELEMENTAL MG) 400 MG: 400 (241.3 MG) TAB at 17:00

## 2018-09-22 RX ADMIN — ACETAMINOPHEN 650 MG: 325 TABLET, FILM COATED ORAL at 23:12

## 2018-09-22 RX ADMIN — OXYCODONE HYDROCHLORIDE 10 MG: 10 TABLET ORAL at 15:38

## 2018-09-22 RX ADMIN — LEVOTHYROXINE SODIUM 100 MCG: 100 TABLET ORAL at 06:15

## 2018-09-22 RX ADMIN — FLUTICASONE PROPIONATE 1 PUFF: 110 AEROSOL, METERED RESPIRATORY (INHALATION) at 21:37

## 2018-09-22 RX ADMIN — ATORVASTATIN CALCIUM 80 MG: 80 TABLET, FILM COATED ORAL at 16:50

## 2018-09-22 RX ADMIN — ACETAMINOPHEN 975 MG: 325 TABLET ORAL at 06:15

## 2018-09-22 RX ADMIN — AMIODARONE HYDROCHLORIDE 200 MG: 200 TABLET ORAL at 08:59

## 2018-09-22 RX ADMIN — INSULIN GLARGINE 8 UNITS: 100 INJECTION, SOLUTION SUBCUTANEOUS at 21:44

## 2018-09-22 NOTE — PLAN OF CARE
Problem: PHYSICAL THERAPY ADULT  Goal: Performs mobility at highest level of function for planned discharge setting  See evaluation for individualized goals  Treatment/Interventions: Bed mobility, Gait training, Patient/family training, LE strengthening/ROM, Functional transfer training, Endurance training, Spoke to nursing, OT          See flowsheet documentation for full assessment, interventions and recommendations  Outcome: Progressing  Prognosis: Good  Problem List: Decreased strength, Decreased range of motion, Decreased endurance, Impaired balance, Decreased mobility, Decreased coordination, Decreased safety awareness, Impaired sensation, Decreased skin integrity, Orthopedic restrictions, Pain  Assessment: Pt performed bed mobility and transfer OOB this session, but required extensive time to perform all activities due to pt being emotional throughout entire session and easily distracted, requiring max encouragement and redirection to task to perform actities  Pt able to perform bed mobility and sit pivot transfer bed to chair without assist, but unable to perform stand pivot transfer as she was unable to pivot on RLE or advance it due to poor UE support  Pt impulsively sat at EOB, where sit pivot transfer was initiated  Pt did express self harming behaviors this session due to her condition, and asked if one of the staff "would like to be slapped "  Nursing made aware, & pt ended session in chair with alarm on & all needs within reach  Will continue to benefit from therapy services to improve strength, functional transfers, ambulate, & improve long term independence  Barriers to Discharge: Inaccessible home environment, Decreased caregiver support     Recommendation: Post acute IP rehab     PT - OK to Discharge: Yes (to rehab when medically stable)    See flowsheet documentation for full assessment

## 2018-09-22 NOTE — DISCHARGE SUMMARY
[facility] CENTRAL Discharge Summary - Medical Judi Risen [de-identified] y o  female MRN: [id number]  [address] [address] Room / Bed: [address] [address] [phone] Encounter: [id number]     BRIEF OVERVIEW     Admitting Provider: Lydia Mitchell MD  Discharge Provider: Oj Reyes MD  Primary Care Physician at Discharge: Oj Reyes, One Butch Lyles     Admission Date: 8/8/2018     Discharge Date: 8/24/18  [facility] Course  This is an 70-year-old female with past medical history significant for DVT/PE on Xarelto, hypertension, type 2 diabetes mellitus, paroxysmal atrial fibrillation, severe peripheral artery disease, CVA, CHF, GERD, CAD status post CABG and hyperlipidemia who presented on 08/08 with bilateral toe wounds and associated left foot cellulitis  Patient noticed increasing erythema and pain of the left foot prior to admission  She had noticed a yellow discharge coming from the wounds as well  Patient was scheduled for outpatient lower extremity angiography on 08/09 which was canceled due to hospitalization  Initially patient was started on vancomycin which was narrowed to IV Ancef  Due to die intolerance patient underwent preparation and then had IR angiography with angioplasty of the right lower extremity on 08/10  Vascular surgery was consulted and followed along  They recommended postprocedure ABIs  Podiatry was also consulted and recommended a x-ray of the right foot which revealed osteomyelitis of the 3rd toe  Patient completed 7 day course of Ancef  Per ID recommendations antibiotics were then discontinued as the erythema and rubor were thought to be related to circulatory vascular issues as opposed to infectious  Podiatry recommended getting lower extremity arterial Dopplers to make a informed recommendation whether patient should have right toe amputation for osteomyelitis versus right BKA    Unfortunately, the arterial Doppler machine was in disrepair  Patient was kept in the hospital for supportive care and pain control until she could have study done which would determine definitive operative treatment  With the Doppler machine unable to be repaired, patient was taken for angiogram of left lower extremity  Findings of severe multi-vessel disease with poor runoff  No intervention was performed  Discussions between Podiatry and vascular determined that plan of care would be provide local wound care and pain management and watchful waiting versus amputation of left lower extremity  This was discussed with patient and son who requested a 2nd opinion  Second vascular surgeon was able to review patient's case and determined that there is a possible chance for bypass  As patient is moderate to high risk for surgery, it was discussed in length with patient and son that surgery would be 4-6 hours and she is high risk for postoperative cardiac complications  Patient wishes to proceed with procedure  Patient is to be discharged short-term rehab with outpatient follow-up with surgery to set up timing of procedure      Physical Exam   Constitutional: She appears well-developed and well-nourished  No distress  HENT:   Head: Normocephalic and atraumatic  Mouth/Throat: Oropharynx is clear and moist  No oropharyngeal exudate  Eyes: Conjunctivae and EOM are normal  Pupils are equal, round, and reactive to light  Cardiovascular: Normal rate and regular rhythm  Murmur heard  Pulmonary/Chest: Effort normal and breath sounds normal  No respiratory distress  She has no wheezes  She has no rales  Abdominal: Soft  Bowel sounds are normal  She exhibits no distension  There is no tenderness  There is no guarding  Musculoskeletal:   B/l toe ulcerations and necrosis    Neurological: She is alert  Skin: Skin is warm and dry  She is not diaphoretic  Psychiatric: She has a normal mood and affect   Her behavior is normal  Judgment and thought content normal             Presenting Problem/History of Present Illness  Principal Problem:    Cellulitis  Active Problems:    Essential hypertension    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 native heart without angina pectoris    Type 2 diabetes mellitus with complication, with long-term current use of insulin ([facility])    Atrial flutter ([facility])    Hyperlipidemia    Gastroesophageal reflux disease without esophagitis    Chronic combined systolic and diastolic congestive heart failure ([facility])    Paroxysmal atrial fibrillation ([facility])    Ulcer of toe of left foot ([facility])    Ulcer of toe of right foot ([facility] [address] )    History of CVA (cerebrovascular accident)    Seizure ([facility] [address] )    Anemia    PAD (peripheral artery disease) ([facility])    CKD (chronic kidney disease) stage 3, GFR 30-59 ml/min  Resolved Problems:    * No resolved hospital problems  *     1  B/L Toe Wounds with 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 2/2 to severe PAD s/p IR RLE angioplasties of R distal SFA, mid R peroneal artery  - podiatry-local wound care to bilateral feet, no surgical plan for right foot at present     - vascular-Outpatient follow up with Surgery for timing of vascular bypass  - DC to rehab for now   - continue pain control - fentanyl patch, oxycodone, tylenol, gapabentin      Diagnostic Procedures Performed  Imaging Studies:  Xr Chest 2 Views     Result Date: 8/8/2018  Impression: Stable cardiomegaly  No acute cardiopulmonary disease  Workstation performed: [id number]      Xr Foot 3+ Vw Left     Result Date: 8/13/2018  Impression: No acute osseous abnormality  Workstation performed: [id number]      Xr Foot 3+ Vw Right     Result Date: 8/13/2018  Impression: Osteomyelitis distal 3rd phalanx  Focal soft tissue wound medial to the metatarsal head with no underlying osteomyelitis  The study was marked in [facility] for immediate notification   Workstation performed: [id number]      Ir Abdominal Angiography / Intervention     Result Date: 8/10/2018  Impression: Impression: 1  Mild diffuse nonocclusive disease of the above knee arteries in the right lower extremity  2   Successful recanalization of mid right peroneal artery  3   Occluded right anterior tibialis and posterior tibialis arteries with reconstitution of dorsalis pedis and posterior tibialis through collaterals   Workstation performed: [id number]      Pertinent Labs:       Results from last 7 days  Lab Units 08/22/18  0528 08/21/18  0500 08/18/18  0545   SODIUM mmol/L 134* 132* 138   POTASSIUM mmol/L 3 7 4 3 4 3   CHLORIDE mmol/L 98* 99* 102   CO2 mmol/L 29 28 27   BUN mg/dL 27* 16 13   CREATININE mg/dL 0 99 0 97 0 78   CALCIUM mg/dL 8 5 8 6 8 9   GLUCOSE RANDOM mg/dL 182* 205* 84         Therapeutic Procedures Performed  RLE angiogram with angioplasty 8/10/18     Test Results Pending at Discharge: none     Medications      Medication List to be Continued at Discharge       Current Discharge Medication List            CONTINUE these medications which have NOT CHANGED     Details   amiodarone 200 mg tablet Take 1 tablet (200 mg total) by mouth 2 (two) times a day  Qty: 60 tablet, Refills: 3     Associated Diagnoses: Atrial flutter, unspecified type (HCC)       amLODIPine (NORVASC) 5 mg tablet Take 1 tablet (5 mg total) by mouth daily  Qty: 30 tablet, Refills: 5     Associated Diagnoses: Essential hypertension       aspirin 81 MG tablet Take 81 mg by mouth daily       atorvastatin (LIPITOR) 80 mg tablet Take 1 tablet (80 mg total) by mouth daily  Qty: 30 tablet, Refills: 2     Associated Diagnoses: Mixed hyperlipidemia       Cholecalciferol (VITAMIN D3) 1000 units CHEW Chew 1 tablet (1,000 Units total) 2 (two) times a day  Qty: 60 tablet, Refills: 5     Associated Diagnoses: Vitamin D deficiency       esomeprazole (NEXIUM) 20 mg capsule Take 20 mg by mouth every morning before breakfast       ferrous sulfate 325 (65 Fe) mg tablet Take 1 tablet (325 mg total) by mouth daily with breakfast  Qty: 90 tablet, Refills: 1     Associated Diagnoses: Iron deficiency       fluticasone (FLONASE) 50 mcg/act nasal spray 1 spray into each nostril daily       furosemide (LASIX) 40 mg tablet Take 1 tablet (40 mg total) by mouth daily  Qty: 90 tablet, Refills: 3     Associated Diagnoses: Chronic systolic heart failure (HCC)       glucose blood test strip 1 each by Other route 4 (four) times a day (before meals and at bedtime) Use as instructed       Insulin Syringe-Needle U-100 (BD INSULIN SYRINGE ULTRAFINE) 31G X 5/16" 1 ML MISC Inject under the skin 2 (two) times a day  Qty: 200 each, Refills: 3     Associated Diagnoses: Type 2 diabetes mellitus with complication, with long-term current use of insulin ([facility])       LANTUS 100 UNIT/ML subcutaneous injection Inject 20 Units under the skin 2 (two) times a day  Qty: 10 mL, Refills: 0     Associated Diagnoses: Type 2 diabetes mellitus with complication, with long-term current use of insulin ([facility])       levETIRAcetam (KEPPRA) 750 mg tablet Take 1 tablet (750 mg total) by mouth every 12 (twelve) hours  Qty: 60 tablet, Refills: 11     Associated Diagnoses: Seizure (HCC)       levothyroxine 100 mcg tablet Take 1 tablet (100 mcg total) by mouth daily  Qty: 90 tablet, Refills: 1     Associated Diagnoses: Hypothyroidism, unspecified type       lisinopril (ZESTRIL) 5 mg tablet Take 1 tablet (5 mg total) by mouth daily  Qty: 90 tablet, Refills: 3     Associated Diagnoses: Chronic systolic heart failure (HCC)       loratadine (CLARITIN) 10 mg tablet Take 1 tablet (10 mg total) by mouth daily  Qty: 90 tablet, Refills: 1     Associated Diagnoses: URI, acute       magnesium oxide (MAG-OX) 400 mg Take 1 tablet (400 mg total) by mouth 2 (two) times a day  Qty: 180 tablet, Refills: 1     Associated Diagnoses: Hypomagnesemia       metFORMIN (GLUCOPHAGE) 1000 MG tablet 1,000 mg 2 (two) times a day       metoclopramide (REGLAN) 10 mg tablet Take 0 5 tablets (5 mg total) by mouth every 6 (six) hours as needed (Nausea and vomiting)  Qty: 30 tablet, Refills: 1     Associated Diagnoses: Viral gastroenteritis       Mometasone Furoate (ASMANEX HFA) 100 MCG/ACT AERO Inhale 2 puffs once daily  Qty: 1 Inhaler, Refills: 5     Associated Diagnoses: Moderate asthma without complication, unspecified whether persistent       pantoprazole (PROTONIX) 40 mg tablet Take 40 mg by mouth daily       potassium chloride (K-DUR,KLOR-CON) 10 mEq tablet Take 1 tablet (10 mEq total) by mouth 2 (two) times a day  Qty: 60 tablet, Refills: 2     Associated Diagnoses: Hypokalemia       rivaroxaban (XARELTO) 20 mg tablet Take 1 tablet (20 mg total) by mouth daily  Qty: 90 tablet, Refills: 0     Associated Diagnoses: Paroxysmal atrial fibrillation (HCC)       acetaminophen (TYLENOL) 650 mg CR tablet Take 1 tablet (650 mg total) by mouth every 8 (eight) hours as needed for mild pain Do not take in conjunction with Percocet  Qty: 90 tablet, Refills: 0     Associated Diagnoses: S/P TAVR (transcatheter aortic valve replacement)       albuterol (VENTOLIN HFA) 90 mcg/act inhaler Inhale 108 mcg 2 (two) times a day as needed 2 puffs bid PRN       calcium carbonate (TUMS) 500 mg chewable tablet Chew 2 tablets 3 (three) times a day         cholecalciferol (VITAMIN D3) 1,000 units tablet Take 1,000 Units by mouth 2 (two) times a day         metoprolol tartrate (LOPRESSOR) 25 mg tablet Take 0 5 tablets (12 5 mg total) by mouth every 12 (twelve) hours  Qty: 90 tablet, Refills: 1     Associated Diagnoses: Paroxysmal atrial fibrillation ([address] );  Atrial flutter, unspecified type ([facility])       nitroglycerin (NITROSTAT) 0 4 mg SL tablet Place 0 4 mg under the tongue every 3 (three) minutes as needed       phenazopyridine (PYRIDIUM) 200 mg tablet Take 1 tablet (200 mg total) by mouth 3 (three) times a day with meals  Qty: 10 tablet, Refills: 0     Associated Diagnoses: Dysuria       polyethylene glycol (MIRALAX) 17 g packet Take 17 g by mouth daily as needed               Current Discharge Medication List            START taking these medications     Details   HYDROcodone-acetaminophen (NORCO) 5-325 mg per tablet Take 1 tablet by mouth every 6 (six) hours as needed for pain for up to 4 days Max Daily Amount: 4 tablets  Qty: 18 tablet, Refills: 0     Associated Diagnoses: Cellulitis                   Current Discharge Medication List            STOP taking these medications         cephalexin (KEFLEX) 500 mg capsule Comments:   Reason for Stopping:            diphenhydrAMINE (BENADRYL) 50 mg capsule Comments:   Reason for Stopping:            methylPREDNISolone (MEDROL) 32 MG tablet Comments:   Reason for Stopping:                    Allergies        Allergies   Allergen Reactions    Other Chest Pain       IVP-listed as "chest pain" in previous chart, patient stated this occurred "a long time ago" but does not remember exactly occurred     Cephalosporins Chest Pain    Contrast [Iodinated Diagnostic Agents] Other (See Comments)       Flash pulm edema    Doxycycline Chest Pain    Ketorolac Other (See Comments)       Chest pain     Levaquin [Levofloxacin] Chest Pain    Ondansetron Chest Pain       Prolonged QT    Toradol [Ketorolac Tromethamine] Chest Pain            Discharge Diet: cardiac diet  Activity restrictions: Per rehab recommendations, Podiatry, vascular recommendations  Discharge Condition: stable  Discharged With Lines: no    Discharge Disposition: Home with [address] [place] [address]              [facility]      [phone] [facility]  [address] [place] [address]        Next Steps: Follow up      Instructions: call for f/u appt      [address] [phone] [address] [place] [address]        Next Steps: Go to      Instructions: Please call and make an appointment with Dr Demarcus Guzman for wound care        Isaias Pallas, MD  PCP - General Internal Medicine [phone] [address]    Next Steps: Schedule an appointment as soon as possible for a visit in 2 week(s)                  Code Status: Level 1 - Full Code      Discharge  Statement   I spent 30 minutes minutes discharging the patient  This time was spent on the day of discharge  I had direct contact with the patient on the day of discharge  Additional documentation is required if more than 30 minutes were spent on discharge

## 2018-09-22 NOTE — PHYSICAL THERAPY NOTE
Physical Therapy Progress Note     09/22/18 1136   Pain Assessment   Pain Assessment 0-10   Pain Score Worst Possible Pain   Pain Type Acute pain   Pain Location Leg   Pain Orientation Left   Hospital Pain Intervention(s) Repositioned; Ambulation/increased activity; Distraction; Emotional support; Rest   Response to Interventions tolerated   Restrictions/Precautions   RLE Weight Bearing Per Order WBAT  (in surgical shoe)   Other Precautions Pain; Fall Risk;Multiple lines;WBS;Chair Alarm   General   Family/Caregiver Present No   Subjective   Subjective Pt presented to therapy sidelying towards foot of bed with LEs dangling off EOB  Initially reluctant to participate in therapy as she was tired from poor night of sleep  Pt agreeable to transfer OOB to chair with encouragement and became more alert throughout session  Pt demonstrated vairiety of emotions throughout session including anger towards staff for asking her to mobilize, sadness, depression, and spoke about suicide because she was "being a burden to her kids "  Pt also happy & easily distracted when talking about her grandkid's racing success, which makes her proud  Transfers   Sit to Stand 4  Minimal assistance   Additional items Assist x 2   Stand to Sit 4  Minimal assistance   Additional items Assist x 2   Stand pivot Unable to assess  (pt unable to intiate pivot on RLE, requiring return to bed)   Sit pivot 5  Supervision   Additional items Assist x 1; Increased time required;Verbal cues   Balance   Static Sitting Good   Static Standing Fair   Ambulatory Poor -   Endurance Deficit   Endurance Deficit Yes   Endurance Deficit Description pain, fatigue   Activity Tolerance   Activity Tolerance Patient tolerated treatment well;Patient limited by pain; Patient limited by fatigue   Nurse Made Aware Donavon Leventhal, RN   Assessment   Prognosis Good   Problem List Decreased strength;Decreased range of motion;Decreased endurance; Impaired balance;Decreased mobility; Decreased coordination;Decreased safety awareness; Impaired sensation;Decreased skin integrity;Orthopedic restrictions;Pain   Assessment Pt performed bed mobility and transfer OOB this session, but required extensive time to perform all activities due to pt being emotional throughout entire session and easily distracted, requiring max encouragement and redirection to task to perform actities  Pt able to perform bed mobility and sit pivot transfer bed to chair without assist, but unable to perform stand pivot transfer as she was unable to pivot on RLE or advance it due to poor UE support  Pt impulsively sat at EOB, where sit pivot transfer was initiated  Pt did express self harming behaviors this session due to her condition, and asked if one of the staff "would like to be slapped "  Nursing made aware, & pt ended session in chair with alarm on & all needs within reach  Will continue to benefit from therapy services to improve strength, functional transfers, ambulate, & improve long term independence  Barriers to Discharge Inaccessible home environment;Decreased caregiver support   Goals   Patient Goals to see her grandson race   STG Expiration Date 10/01/18   Treatment Day 2   Plan   Treatment/Interventions Functional transfer training;LE strengthening/ROM; Therapeutic exercise; Endurance training;Patient/family training;Equipment eval/education; Bed mobility;Gait training   Progress Progressing toward goals   PT Frequency (3-5x/week)   Recommendation   Recommendation Post acute IP rehab   Equipment Recommended Walker; Wheelchair     [place], [place]

## 2018-09-22 NOTE — PROGRESS NOTES
Progress Note - Vascular Surgery   Les Lin [de-identified] y o  female MRN: [id number]  Unit/Bed#: [facility] 527-01 Encounter: [id number]    Assessment:  [de-identified] y/o F w/ PAD, L 1st toe wet gangrene, R foot dry gangrene  --9/12 L plantar artery exploration  --9/17 L BKA    Plan:  --Local L BKA wound care  --Continue Xarelto  --R foot hyperbaric oxygen tx per Podiatry team  --Dispo planning per CM for STR    Subjective/Objective     Subjective:     No acute events overnight  Pt c/o 10/10 R foot pain this AM  Denies any other complaints  Objective:     Blood pressure 144/65, pulse 69, temperature 98 3 °F (36 8 °C), temperature source Oral, resp  rate 20, height 5' 4" (1 626 m), weight 67 8 kg (149 lb 7 6 oz), SpO2 96 %, not currently breastfeeding  ,Body mass index is 25 66 kg/m²  Intake/Output Summary (Last 24 hours) at 09/22/18 0542  Last data filed at 09/21/18 2247   Gross per 24 hour   Intake              400 ml   Output              800 ml   Net             -400 ml       Invasive Devices     Peripheral Intravenous Line            Peripheral IV 09/18/18 Right Forearm 3 days          Drain            Urethral Catheter Latex 18 Fr  2 days                Physical Exam:    GEN: NAD  HEENT: MMM  CV: RRR  Lung: normal effort  Ab: Soft, NT/ND  Extrem: No CCE; L BKA dressing c/d/i  Neuro:  A+Ox3, motor and sensation grossly intact  Pulse exam: R doppl PT    Lab, Imaging and other studies:  CBC:   Lab Results   Component Value Date    WBC 8 96 09/22/2018    HGB 9 3 (L) 09/22/2018    HCT 30 1 (L) 09/22/2018    MCV 86 09/22/2018     09/22/2018    MCH 26 6 (L) 09/22/2018    MCHC 30 9 (L) 09/22/2018    RDW 20 7 (H) 09/22/2018    MPV 10 3 09/22/2018   , CMP: No results found for: NA, K, CL, CO2, ANIONGAP, BUN, CREATININE, GLUCOSE, CALCIUM, AST, ALT, ALKPHOS, PROT, ALBUMIN, BILITOT, EGFR, Coagulation: No results found for: PT, INR, APTT, Urinalysis: No results found for: COLORU, CLARITYU, SPECGRAV, PHUR, LEUKOCYTESUR, NITRITE, YAMILE Bryant, ROZINA, BLOODU, Amylase: No results found for: AMYLASE, Lipase: No results found for: LIPASE  VTE Pharmacologic Prophylaxis: Xarelto  VTE Mechanical Prophylaxis: sequential compression device

## 2018-09-22 NOTE — PROGRESS NOTES
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  2  DTI right medial hallux  3  Peripheral arterial disease  4  LEONARD  5  Dm type 2  6  Status post left BKA    Plan:  - wounds appear to be stable on the right foot, applied Betadine  Continue with hyperbaric oxygen therapy  Patient is stable for discharge from Podiatry standpoint   - Follow in outpatient wound R Malathi Masters 49 with Dr Mirta Foote  - continue with Betadine paint to wounds daily  - podiatry to seen next 9/26   - arrest of care per primary team     Subjective/Objective   Chief Complaint:   Chief Complaint   Patient presents with    Foot Ulcer     PER ems, "FROM [facility], SENT FOR EVAL OF BILAT TOE INFECTIONS, ONGOING PROBLEM"       Subjective: [de-identified] y o  y/o female was seen and evaluated at bedside  Patient appears to be unhappy, patient asked me if I was the girl who stopped her pain medications  She states "if I know who she is I will strangle her from her neck " Patient complaints regarding her bed  I notified nurse about the issue  Patient complaints of severe pain to the lower extremities  Blood pressure 144/65, pulse 69, temperature 98 3 °F (36 8 °C), temperature source Oral, resp  rate 20, height 5' 4" (1 626 m), weight 67 8 kg (149 lb 7 6 oz), SpO2 96 %, not currently breastfeeding  ,Body mass index is 25 66 kg/m²  Invasive Devices     Peripheral Intravenous Line            Peripheral IV 09/18/18 Right Forearm 3 days          Drain            Urethral Catheter Latex 18 Fr  2 days                Physical Exam:   General: Alert, cooperative and no distress  Lungs: Non labored breathing  Heart: Positive S1, S2  Abdomen: Soft, non-tender    Extremity: Right foot dry gangrene at medial 1st MTPJ, medial hallux and right 3rd digit continue to appear stable without any clinical signs of infection  No active purulent drainage, no malodor, no ascending erythema, no crepitus, no fluctuance noted  Right foot 9/22/18            Lab, Imaging and other studies:   CBC:   Lab Results   Component Value Date    WBC 8 96 09/22/2018    HGB 9 3 (L) 09/22/2018    HCT 30 1 (L) 09/22/2018    MCV 86 09/22/2018     09/22/2018    MCH 26 6 (L) 09/22/2018    MCHC 30 9 (L) 09/22/2018    RDW 20 7 (H) 09/22/2018    MPV 10 3 09/22/2018   , CMP:   Lab Results   Component Value Date     09/22/2018    K 4 2 09/22/2018     09/22/2018    CO2 29 09/22/2018    BUN 26 (H) 09/22/2018    CREATININE 0 78 09/22/2018    CALCIUM 8 1 (L) 09/22/2018    EGFR 72 09/22/2018       Imaging: I have personally reviewed pertinent films in PACS  EKG, Pathology, and Other Studies: I have personally reviewed pertinent reports    VTE Pharmacologic Prophylaxis: Heparin

## 2018-09-22 NOTE — PROGRESS NOTES
Patient complaining of 10/10 pain still in her left leg despite oxycodone dose  [facility] Surgery resident notified, and will look through patient's chart and add med if able  Patient made aware of same  She is not happy but agreeable to wait

## 2018-09-23 LAB
ANION GAP SERPL CALCULATED.3IONS-SCNC: 5 MMOL/L (ref 4–13)
BASOPHILS # BLD AUTO: 0.03 THOUSANDS/ΜL (ref 0–0.1)
BASOPHILS NFR BLD AUTO: 0 % (ref 0–1)
BUN SERPL-MCNC: 32 MG/DL (ref 5–25)
CALCIUM SERPL-MCNC: 8.2 MG/DL (ref 8.3–10.1)
CHLORIDE SERPL-SCNC: 104 MMOL/L (ref 100–108)
CO2 SERPL-SCNC: 28 MMOL/L (ref 21–32)
CREAT SERPL-MCNC: 0.78 MG/DL (ref 0.6–1.3)
EOSINOPHIL # BLD AUTO: 0.44 THOUSAND/ΜL (ref 0–0.61)
EOSINOPHIL NFR BLD AUTO: 5 % (ref 0–6)
ERYTHROCYTE [DISTWIDTH] IN BLOOD BY AUTOMATED COUNT: 20.3 % (ref 11.6–15.1)
GFR SERPL CREATININE-BSD FRML MDRD: 72 ML/MIN/1.73SQ M
GLUCOSE SERPL-MCNC: 103 MG/DL (ref 65–140)
GLUCOSE SERPL-MCNC: 122 MG/DL (ref 65–140)
GLUCOSE SERPL-MCNC: 145 MG/DL (ref 65–140)
GLUCOSE SERPL-MCNC: 173 MG/DL (ref 65–140)
GLUCOSE SERPL-MCNC: 173 MG/DL (ref 65–140)
HCT VFR BLD AUTO: 30.6 % (ref 34.8–46.1)
HGB BLD-MCNC: 9.3 G/DL (ref 11.5–15.4)
IMM GRANULOCYTES # BLD AUTO: 0.08 THOUSAND/UL (ref 0–0.2)
IMM GRANULOCYTES NFR BLD AUTO: 1 % (ref 0–2)
LYMPHOCYTES # BLD AUTO: 1.94 THOUSANDS/ΜL (ref 0.6–4.47)
LYMPHOCYTES NFR BLD AUTO: 21 % (ref 14–44)
MCH RBC QN AUTO: 26.3 PG (ref 26.8–34.3)
MCHC RBC AUTO-ENTMCNC: 30.4 G/DL (ref 31.4–37.4)
MCV RBC AUTO: 86 FL (ref 82–98)
MONOCYTES # BLD AUTO: 0.93 THOUSAND/ΜL (ref 0.17–1.22)
MONOCYTES NFR BLD AUTO: 10 % (ref 4–12)
NEUTROPHILS # BLD AUTO: 5.81 THOUSANDS/ΜL (ref 1.85–7.62)
NEUTS SEG NFR BLD AUTO: 63 % (ref 43–75)
NRBC BLD AUTO-RTO: 0 /100 WBCS
PLATELET # BLD AUTO: 238 THOUSANDS/UL (ref 149–390)
PMV BLD AUTO: 10.2 FL (ref 8.9–12.7)
POTASSIUM SERPL-SCNC: 4.3 MMOL/L (ref 3.5–5.3)
RBC # BLD AUTO: 3.54 MILLION/UL (ref 3.81–5.12)
SODIUM SERPL-SCNC: 137 MMOL/L (ref 136–145)
WBC # BLD AUTO: 9.23 THOUSAND/UL (ref 4.31–10.16)

## 2018-09-23 PROCEDURE — 97530 THERAPEUTIC ACTIVITIES: CPT

## 2018-09-23 PROCEDURE — 97110 THERAPEUTIC EXERCISES: CPT

## 2018-09-23 PROCEDURE — 82948 REAGENT STRIP/BLOOD GLUCOSE: CPT

## 2018-09-23 PROCEDURE — 80048 BASIC METABOLIC PNL TOTAL CA: CPT | Performed by: SURGERY

## 2018-09-23 PROCEDURE — 99024 POSTOP FOLLOW-UP VISIT: CPT | Performed by: SURGERY

## 2018-09-23 PROCEDURE — 85025 COMPLETE CBC W/AUTO DIFF WBC: CPT | Performed by: SURGERY

## 2018-09-23 RX ADMIN — OXYCODONE HYDROCHLORIDE 10 MG: 10 TABLET ORAL at 17:05

## 2018-09-23 RX ADMIN — DICLOFENAC 2 G: 10 GEL TOPICAL at 21:22

## 2018-09-23 RX ADMIN — ACETAMINOPHEN 650 MG: 325 TABLET, FILM COATED ORAL at 17:05

## 2018-09-23 RX ADMIN — VITAMIN D, TAB 1000IU (100/BT) 1000 UNITS: 25 TAB at 08:10

## 2018-09-23 RX ADMIN — LISINOPRIL 10 MG: 10 TABLET ORAL at 08:10

## 2018-09-23 RX ADMIN — RIVAROXABAN 20 MG: 20 TABLET, FILM COATED ORAL at 08:09

## 2018-09-23 RX ADMIN — OXYCODONE HYDROCHLORIDE 10 MG: 10 TABLET ORAL at 10:10

## 2018-09-23 RX ADMIN — MAGNESIUM OXIDE TAB 400 MG (241.3 MG ELEMENTAL MG) 400 MG: 400 (241.3 MG) TAB at 17:05

## 2018-09-23 RX ADMIN — INSULIN GLARGINE 12 UNITS: 100 INJECTION, SOLUTION SUBCUTANEOUS at 08:15

## 2018-09-23 RX ADMIN — INSULIN LISPRO 1 UNITS: 100 INJECTION, SOLUTION INTRAVENOUS; SUBCUTANEOUS at 11:18

## 2018-09-23 RX ADMIN — FUROSEMIDE 40 MG: 40 TABLET ORAL at 08:09

## 2018-09-23 RX ADMIN — AMIODARONE HYDROCHLORIDE 200 MG: 200 TABLET ORAL at 08:09

## 2018-09-23 RX ADMIN — ATORVASTATIN CALCIUM 80 MG: 80 TABLET, FILM COATED ORAL at 17:05

## 2018-09-23 RX ADMIN — OXYCODONE HYDROCHLORIDE 10 MG: 10 TABLET ORAL at 13:50

## 2018-09-23 RX ADMIN — LEVOTHYROXINE SODIUM 100 MCG: 100 TABLET ORAL at 05:05

## 2018-09-23 RX ADMIN — ACETAMINOPHEN 650 MG: 325 TABLET, FILM COATED ORAL at 11:18

## 2018-09-23 RX ADMIN — BISACODYL 10 MG: 5 TABLET, COATED ORAL at 08:09

## 2018-09-23 RX ADMIN — OXYCODONE HYDROCHLORIDE 10 MG: 10 TABLET ORAL at 20:08

## 2018-09-23 RX ADMIN — AMLODIPINE BESYLATE 10 MG: 10 TABLET ORAL at 08:10

## 2018-09-23 RX ADMIN — ACETAMINOPHEN 650 MG: 325 TABLET, FILM COATED ORAL at 23:00

## 2018-09-23 RX ADMIN — DICLOFENAC 2 G: 10 GEL TOPICAL at 08:10

## 2018-09-23 RX ADMIN — FLUTICASONE PROPIONATE 1 PUFF: 110 AEROSOL, METERED RESPIRATORY (INHALATION) at 21:22

## 2018-09-23 RX ADMIN — GABAPENTIN 200 MG: 100 CAPSULE ORAL at 17:05

## 2018-09-23 RX ADMIN — LEVETIRACETAM 750 MG: 750 TABLET, FILM COATED ORAL at 08:10

## 2018-09-23 RX ADMIN — OXYCODONE HYDROCHLORIDE 10 MG: 10 TABLET ORAL at 05:05

## 2018-09-23 RX ADMIN — INSULIN LISPRO 1 UNITS: 100 INJECTION, SOLUTION INTRAVENOUS; SUBCUTANEOUS at 21:22

## 2018-09-23 RX ADMIN — OXYCODONE HYDROCHLORIDE 10 MG: 10 TABLET ORAL at 23:00

## 2018-09-23 RX ADMIN — DICLOFENAC 2 G: 10 GEL TOPICAL at 17:05

## 2018-09-23 RX ADMIN — GABAPENTIN 200 MG: 100 CAPSULE ORAL at 08:09

## 2018-09-23 RX ADMIN — ACETAMINOPHEN 650 MG: 325 TABLET, FILM COATED ORAL at 05:05

## 2018-09-23 RX ADMIN — AMIODARONE HYDROCHLORIDE 200 MG: 200 TABLET ORAL at 17:05

## 2018-09-23 RX ADMIN — INSULIN GLARGINE 8 UNITS: 100 INJECTION, SOLUTION SUBCUTANEOUS at 21:21

## 2018-09-23 RX ADMIN — MAGNESIUM OXIDE TAB 400 MG (241.3 MG ELEMENTAL MG) 400 MG: 400 (241.3 MG) TAB at 08:09

## 2018-09-23 RX ADMIN — Medication 1 TABLET: at 21:22

## 2018-09-23 RX ADMIN — FLUTICASONE PROPIONATE 1 PUFF: 110 AEROSOL, METERED RESPIRATORY (INHALATION) at 08:10

## 2018-09-23 NOTE — PROGRESS NOTES
Progress Note - Vascular Surgery   David Rojo [de-identified] y o  female MRN: [id number]  Unit/Bed#: [facility] 527-01 Encounter: [id number]    Assessment:  [de-identified] y/o F w/ PAD, L 1st toe wet gangrene, R foot dry gangrene  --9/12 L plantar artery exploration  --9/17 L BKA    Plan:  --Local L BKA wound care, dressing change today  --Continue Xarelto  --Continue R foot hyperbaric oxygen tx per Podiatry team  --Awaiting placement    Subjective/Objective     Subjective:     No acute events overnight  Patient still having some R foot pain but stable and well controlled  No new complaints  Objective:     Blood pressure 146/65, pulse 57, temperature 97 9 °F (36 6 °C), temperature source Oral, resp  rate 20, height 5' 4" (1 626 m), weight 67 8 kg (149 lb 7 6 oz), SpO2 97 %, not currently breastfeeding  ,Body mass index is 25 66 kg/m²  Intake/Output Summary (Last 24 hours) at 09/23/18 0813  Last data filed at 09/23/18 0501   Gross per 24 hour   Intake              600 ml   Output              700 ml   Net             -100 ml       Invasive Devices     Drain            Urethral Catheter Latex 18 Fr  3 days                Physical Exam:    GEN: NAD  HEENT: MMM  CV: RRR  Lung: normal effort  Ab: Soft, NT/ND  Extrem: No CCE; L BKA dressing c/d/i  Neuro:  A+Ox3, motor and sensation grossly intact  Pulse exam: R doppl DP/PT    Lab, Imaging and other studies:  CBC:   Lab Results   Component Value Date    WBC 9 23 09/23/2018    HGB 9 3 (L) 09/23/2018    HCT 30 6 (L) 09/23/2018    MCV 86 09/23/2018     09/23/2018    MCH 26 3 (L) 09/23/2018    MCHC 30 4 (L) 09/23/2018    RDW 20 3 (H) 09/23/2018    MPV 10 2 09/23/2018    NRBC 0 09/23/2018   , CMP:   Lab Results   Component Value Date     09/23/2018    K 4 3 09/23/2018     09/23/2018    CO2 28 09/23/2018    BUN 32 (H) 09/23/2018    CREATININE 0 78 09/23/2018    CALCIUM 8 2 (L) 09/23/2018    EGFR 72 09/23/2018     VTE Pharmacologic Prophylaxis: Xarelto  VTE Mechanical Prophylaxis: sequential compression device

## 2018-09-23 NOTE — PHYSICAL THERAPY NOTE
Physical Therapy Progress Note     09/23/18 1310   Pain Assessment   Pain Assessment FLACC   Pain Rating: FLACC (Rest) - Face 0   Pain Rating: FLACC (Rest) - Legs 1   Pain Rating: FLACC (Rest) - Activity 0   Pain Rating: FLACC (Rest) - Cry 1   Pain Rating: FLACC (Rest) - Consolability 1   Score: FLACC (Rest) 3   Pain Rating: FLACC (Activity) - Face 1   Pain Rating: FLACC (Activity) - Legs 1   Pain Rating: FLACC (Activity) - Activity 1   Pain Rating: FLACC (Activity) - Cry 1   Pain Rating: FLACC (Activity) - Consolability 1   Score: FLACC (Activity) 5   Restrictions/Precautions   RLE Weight Bearing Per Order WBAT  (in surgical shoe)   Other Precautions Pain; Fall Risk;Multiple lines;WBS   General   Family/Caregiver Present No   Subjective   Subjective Pt more pleasant this session, did not discuss self ham this session  Pt was inappropriate throughout session, repeatedly referring to sex throughout the session and making comments when this PTA placed hand on hip to assist with sit to stand transfers  Pt easily distracted throughout session, and "likes to move at her own pace "   Transfers   Sit to Stand 4  Minimal assistance   Additional items Assist x 1; Increased time required;Verbal cues;Armrests   Stand to Sit 4  Minimal assistance   Additional items Assist x 1; Armrests; Increased time required;Verbal cues   Balance   Static Sitting Good   Static Standing Fair   Endurance Deficit   Endurance Deficit Yes   Endurance Deficit Description pain   Activity Tolerance   Activity Tolerance Patient limited by pain   Nurse Cayla Ferguson RN   Exercises   Hip Abduction Sitting;15 reps;AROM; Bilateral   Hip Adduction Sitting;15 reps;AROM; Bilateral   Knee AROM Long Arc Quad 15 reps; Sitting;AROM; Bilateral   Marching Sitting;15 reps;AROM; Bilateral   Assessment   Prognosis Good   Problem List Decreased strength;Decreased range of motion;Decreased endurance; Impaired balance;Decreased mobility; Decreased coordination;Decreased safety awareness; Impaired sensation;Decreased skin integrity;Orthopedic restrictions;Pain   Assessment Session today limited by pt's ability to remain on task and attend to activities being performed  Pt required extensive time to perfrom all seated mobility and sit to stand transfers during session, and was easily distracted by conversation  Pt was repeatedly sexually inappropriate as noted above, which distracted from the task at hand  Pt did perform sit to stand transfers with decreased assist today, but was unable to maintain upright posture due to pain  Pt able to stand for 15-30 seconds each attempt, but was limited by pain in RLE  Would benefit from shorter RW to improve UE support to initiate improved posture, and allow for improved stand pivot transfers & hopping when appropriate  Pt performed seated TE in recliner to improve strength , activity tolerance, and facilitate improved mobility  Pt also instructed to keep L knee straight to prevent contractures  Pt verbalized understanding  Will continue to benefit from therapy services to improve functional moblity, strength, activity tolerance, and safety awareness to maximize independent function  Barriers to Discharge Inaccessible home environment;Decreased caregiver support   Goals   Patient Goals to see her family today   STG Expiration Date 10/01/18   Treatment Day 3   Plan   Treatment/Interventions Functional transfer training;LE strengthening/ROM; Therapeutic exercise; Endurance training;Patient/family training;Equipment eval/education; Bed mobility;Gait training   Progress Slow progress, decreased activity tolerance   PT Frequency (3-5x/week)   Recommendation   Recommendation Post acute IP rehab   Equipment Recommended Walker; Wheelchair     [place], [place]

## 2018-09-23 NOTE — SOCIAL WORK
Cm received call from [facility] with vascular regarding [facility] referral for pt  Per [facility] his team spoke with Dr Sinai Ritter from podiatry and plan is to hold hyperbaric treatments until pt is outpatient  Cm sent updated referral to [facility] and awaiting decision

## 2018-09-23 NOTE — PLAN OF CARE
Problem: PHYSICAL THERAPY ADULT  Goal: Performs mobility at highest level of function for planned discharge setting  See evaluation for individualized goals  Treatment/Interventions: Bed mobility, Gait training, Patient/family training, LE strengthening/ROM, Functional transfer training, Endurance training, Spoke to nursing, OT          See flowsheet documentation for full assessment, interventions and recommendations  Outcome: Progressing  Prognosis: Good  Problem List: Decreased strength, Decreased range of motion, Decreased endurance, Impaired balance, Decreased mobility, Decreased coordination, Decreased safety awareness, Impaired sensation, Decreased skin integrity, Orthopedic restrictions, Pain  Assessment: Session today limited by pt's ability to remain on task and attend to activities being performed  Pt required extensive time to perfrom all seated mobility and sit to stand transfers during session, and was easily distracted by conversation  Pt was repeatedly sexually inappropriate as noted above, which distracted from the task at hand  Pt did perform sit to stand transfers with decreased assist today, but was unable to maintain upright posture due to pain  Pt able to stand for 15-30 seconds each attempt, but was limited by pain in RLE  Would benefit from shorter RW to improve UE support to initiate improved posture, and allow for improved stand pivot transfers & hopping when appropriate  Pt performed seated TE in recliner to improve strength , activity tolerance, and facilitate improved mobility  Pt also instructed to keep L knee straight to prevent contractures  Pt verbalized understanding  Will continue to benefit from therapy services to improve functional moblity, strength, activity tolerance, and safety awareness to maximize independent function    Barriers to Discharge: Inaccessible home environment, Decreased caregiver support     Recommendation: Post acute IP rehab     PT - OK to Discharge: Yes (to rehab when medically stable)    See flowsheet documentation for full assessment

## 2018-09-23 NOTE — PROGRESS NOTES
Spoke with Dr Juan Sen regarding patient's urine output being 75 ml in the last 3 5 hours  No other issues, VSS  No new orders at this time  Will encourage PO intake and monitor patient

## 2018-09-24 ENCOUNTER — APPOINTMENT (OUTPATIENT)
Dept: WOUND CARE | Facility: HOSPITAL | Age: 78
DRG: 239 | End: 2018-09-24
Payer: MEDICARE

## 2018-09-24 VITALS
SYSTOLIC BLOOD PRESSURE: 117 MMHG | OXYGEN SATURATION: 94 % | DIASTOLIC BLOOD PRESSURE: 56 MMHG | HEIGHT: 64 IN | WEIGHT: 149.47 LBS | HEART RATE: 58 BPM | BODY MASS INDEX: 25.52 KG/M2 | TEMPERATURE: 98.5 F | RESPIRATION RATE: 18 BRPM

## 2018-09-24 PROBLEM — R33.9 URINARY RETENTION: Status: ACTIVE | Noted: 2018-09-24

## 2018-09-24 PROBLEM — Z89.511 S/P BKA (BELOW KNEE AMPUTATION) UNILATERAL, RIGHT (HCC): Status: ACTIVE | Noted: 2018-09-24

## 2018-09-24 LAB
GLUCOSE SERPL-MCNC: 120 MG/DL (ref 65–140)
GLUCOSE SERPL-MCNC: 153 MG/DL (ref 65–140)
GLUCOSE SERPL-MCNC: 163 MG/DL (ref 65–140)

## 2018-09-24 PROCEDURE — G0277 HBOT, FULL BODY CHAMBER, 30M: HCPCS

## 2018-09-24 PROCEDURE — 82948 REAGENT STRIP/BLOOD GLUCOSE: CPT

## 2018-09-24 PROCEDURE — 99233 SBSQ HOSP IP/OBS HIGH 50: CPT | Performed by: INTERNAL MEDICINE

## 2018-09-24 PROCEDURE — 99232 SBSQ HOSP IP/OBS MODERATE 35: CPT | Performed by: PSYCHIATRY & NEUROLOGY

## 2018-09-24 PROCEDURE — 99024 POSTOP FOLLOW-UP VISIT: CPT | Performed by: SURGERY

## 2018-09-24 PROCEDURE — 97530 THERAPEUTIC ACTIVITIES: CPT

## 2018-09-24 PROCEDURE — 97535 SELF CARE MNGMENT TRAINING: CPT

## 2018-09-24 RX ORDER — GABAPENTIN 100 MG/1
600 CAPSULE ORAL 2 TIMES DAILY
COMMUNITY
Start: 2018-09-24

## 2018-09-24 RX ORDER — CALCIUM CARBONATE 300MG(750)
1 TABLET,CHEWABLE ORAL DAILY
COMMUNITY
Start: 2018-09-24

## 2018-09-24 RX ORDER — LEVETIRACETAM 750 MG/1
750 TABLET ORAL DAILY
COMMUNITY
Start: 2018-09-24

## 2018-09-24 RX ORDER — METHOCARBAMOL 500 MG/1
250 TABLET, FILM COATED ORAL EVERY 6 HOURS PRN
Qty: 12 TABLET | Refills: 0 | Status: ON HOLD | OUTPATIENT
Start: 2018-09-24 | End: 2018-12-05 | Stop reason: ALTCHOICE

## 2018-09-24 RX ORDER — SENNOSIDES 8.6 MG
650 CAPSULE ORAL EVERY 6 HOURS PRN
COMMUNITY
Start: 2018-09-24

## 2018-09-24 RX ORDER — OXYCODONE HYDROCHLORIDE 5 MG/1
TABLET ORAL
Qty: 24 TABLET | Refills: 0 | Status: SHIPPED | OUTPATIENT
Start: 2018-09-24 | End: 2018-12-10 | Stop reason: HOSPADM

## 2018-09-24 RX ORDER — INSULIN GLARGINE 100 [IU]/ML
8 INJECTION, SOLUTION SUBCUTANEOUS
Status: ON HOLD | COMMUNITY
Start: 2018-09-24 | End: 2018-12-05 | Stop reason: ALTCHOICE

## 2018-09-24 RX ORDER — INSULIN GLARGINE 100 [IU]/ML
12 INJECTION, SOLUTION SUBCUTANEOUS EVERY MORNING
Status: ON HOLD | COMMUNITY
Start: 2018-09-25 | End: 2018-12-05 | Stop reason: ALTCHOICE

## 2018-09-24 RX ORDER — FENTANYL 25 UG/H
1 PATCH TRANSDERMAL
Qty: 3 PATCH | Refills: 0 | Status: SHIPPED | OUTPATIENT
Start: 2018-09-24 | End: 2018-10-22

## 2018-09-24 RX ORDER — BISACODYL 10 MG
10 SUPPOSITORY, RECTAL RECTAL DAILY PRN
Start: 2018-09-24

## 2018-09-24 RX ORDER — AMOXICILLIN 250 MG
1 CAPSULE ORAL
COMMUNITY
Start: 2018-09-24

## 2018-09-24 RX ADMIN — VITAMIN D, TAB 1000IU (100/BT) 1000 UNITS: 25 TAB at 08:04

## 2018-09-24 RX ADMIN — OXYCODONE HYDROCHLORIDE 10 MG: 10 TABLET ORAL at 12:33

## 2018-09-24 RX ADMIN — OXYCODONE HYDROCHLORIDE 10 MG: 10 TABLET ORAL at 02:00

## 2018-09-24 RX ADMIN — FLUTICASONE PROPIONATE 1 PUFF: 110 AEROSOL, METERED RESPIRATORY (INHALATION) at 08:04

## 2018-09-24 RX ADMIN — LISINOPRIL 10 MG: 10 TABLET ORAL at 08:04

## 2018-09-24 RX ADMIN — MAGNESIUM OXIDE TAB 400 MG (241.3 MG ELEMENTAL MG) 400 MG: 400 (241.3 MG) TAB at 08:04

## 2018-09-24 RX ADMIN — BISACODYL 10 MG: 5 TABLET, COATED ORAL at 08:04

## 2018-09-24 RX ADMIN — DICLOFENAC 2 G: 10 GEL TOPICAL at 08:04

## 2018-09-24 RX ADMIN — LEVOTHYROXINE SODIUM 100 MCG: 100 TABLET ORAL at 05:23

## 2018-09-24 RX ADMIN — AMLODIPINE BESYLATE 10 MG: 10 TABLET ORAL at 08:04

## 2018-09-24 RX ADMIN — AMIODARONE HYDROCHLORIDE 200 MG: 200 TABLET ORAL at 08:04

## 2018-09-24 RX ADMIN — FUROSEMIDE 40 MG: 40 TABLET ORAL at 08:04

## 2018-09-24 RX ADMIN — RIVAROXABAN 20 MG: 20 TABLET, FILM COATED ORAL at 08:04

## 2018-09-24 RX ADMIN — ACETAMINOPHEN 650 MG: 325 TABLET, FILM COATED ORAL at 05:22

## 2018-09-24 RX ADMIN — POLYETHYLENE GLYCOL 3350 17 G: 17 POWDER, FOR SOLUTION ORAL at 08:04

## 2018-09-24 RX ADMIN — OXYCODONE HYDROCHLORIDE 10 MG: 10 TABLET ORAL at 07:52

## 2018-09-24 RX ADMIN — GABAPENTIN 200 MG: 100 CAPSULE ORAL at 08:04

## 2018-09-24 RX ADMIN — LEVETIRACETAM 750 MG: 750 TABLET, FILM COATED ORAL at 08:04

## 2018-09-24 RX ADMIN — OXYCODONE HYDROCHLORIDE 5 MG: 5 TABLET ORAL at 15:18

## 2018-09-24 RX ADMIN — INSULIN GLARGINE 12 UNITS: 100 INJECTION, SOLUTION SUBCUTANEOUS at 08:11

## 2018-09-24 NOTE — CASE MANAGEMENT
Continued Stay Review    Date: 9/23    Vital Signs: Blood pressure 146/65, pulse 57, temperature 97 9 °F (36 6 °C), temperature source Oral, resp  rate 20, height 5' 4" (1 626 m), weight 67 8 kg (149 lb 7 6 oz), SpO2 97 %,    Medications:   Scheduled Meds:   Current Facility-Administered Medications:  acetaminophen 650 mg Oral Q6H [facility]   amiodarone 200 mg Oral BID   amLODIPine 10 mg Oral Daily   atorvastatin 80 mg Oral Daily   bisacodyl 10 mg Oral Daily   cholecalciferol 1,000 Units Oral Daily   diclofenac sodium 2 g Topical 4x Daily   fluticasone 1 puff Inhalation Q12H LANCE   furosemide 40 mg Oral Daily   gabapentin 200 mg Oral BID   insulin glargine 12 Units Subcutaneous QAM   insulin glargine 8 Units Subcutaneous HS   insulin lispro 1-5 Units Subcutaneous TID AC   insulin lispro 1-5 Units Subcutaneous HS   levETIRAcetam 750 mg Oral Daily   levothyroxine 100 mcg Oral Early Morning   lisinopril 10 mg Oral Daily   magnesium oxide 400 mg Oral BID   polyethylene glycol 17 g Oral Daily   rivaroxaban 20 mg Oral Daily With Breakfast   senna-docusate sodium 1 tablet Oral HS     Continuous Infusions:    PRN Meds: albuterol    bisacodyl    labetalol    methocarbamol    oxyCODONE     prochlorperazine    Abnormal Labs/Diagnostic Results:   H/H - 9 3/ 30 6    Age/Sex: [de-identified] y o  female     Assessment/Plan:   9/23 Progress notes:  [de-identified] y/o F w/ PAD, L 1st toe wet gangrene, R foot dry gangrene/ S/P 9/21 L Plantar Artery Exploration/ S/P 9/17 L BKA  BKA wound care  Dressing changes  Xarelto   Continue R foot hyperbaric oxygen treatment per Podiatry team  Awaiting placement    Discharge Plan: Referral to [address] [place] [address]

## 2018-09-24 NOTE — SOCIAL WORK
Cm spoke with son Yvette Hanson advised referral was resent to [facility] since HBO treatments are on hold and we are waiting to see if they would have a bed available  Yvette Hanson requesting referral to [facility] as a second choice  Per Yvette Hanson he has not been getting any updates from MD since pt had her surgery and would like to speak with someone  Vascular surgery made aware

## 2018-09-24 NOTE — PROGRESS NOTES
Progress Note - [address] Vanessa Moore [de-identified] y o  female MRN: [id number]  Unit/Bed#: [facility] 527-01 Encounter: [id number]      I came to see the patient for continuation of care, she states that she was feeling better, she spoke to her children's and were very upset with her comments  They told her that they will take care of her and she was very happy about it  Behavior over the last 24 hours:  improved  Sleep: normal  Appetite: normal  Medication side effects: No  ROS: no complaints    Mental Status Evaluation:  Appearance:  age appropriate   Behavior:  normal   Speech:  normal pitch and normal volume   Mood:  euthymic   Affect:  mood-congruent   Language: naming objects and repeating phrases   Thought Process:  goal directed   Associations: intact associations   Thought Content:  normal   Perceptual Disturbances: None   Risk Potential: She denies suicidal thoughts plans or intent   Sensorium:  person, place, time/date and situation   Memory:  recent and remote memory grossly intact   Cognition:  grossly intact   Consciousness:  alert and awake    Attention: attention span and concentration were age appropriate   Intellect: within normal limits   Fund of Knowledge: awareness of current events: Good, past history: Good and vocabulary: Good   Insight:  good   Judgment: good   Muscle Strength and Tone: Within normal limits   Gait/Station: Unable to assess   Motor Activity: no abnormal movements         Assessment/Plan  Vi Raphael is a [de-identified] y o  female with multiple medical problems who have adjustment disorder was evaluated on 09/21 because she was as pressing suicidal thoughts secondary to being feeling that she was a burden for her children  She states that she has a good a weekend , her children were very supportive and she understand that they care for her  She denies suicidal thoughts plans or intent  She states that she wants to get better     Diagnosis:  Adjustment disorder with depressed mood    Recommended Treatment:   Continue medical management  Patient will be discharged today  Discussed with primary team  No other intervention at this moment      Medications:   current meds:   Current Facility-Administered Medications   Medication Dose Route Frequency    acetaminophen (TYLENOL) tablet 650 mg  650 mg Oral Q6H [address]    albuterol (PROVENTIL HFA,VENTOLIN HFA) inhaler 1 puff  1 puff Inhalation Q4H PRN    amiodarone tablet 200 mg  200 mg Oral BID    amLODIPine (NORVASC) tablet 10 mg  10 mg Oral Daily    atorvastatin (LIPITOR) tablet 80 mg  80 mg Oral Daily    bisacodyl (DULCOLAX) EC tablet 10 mg  10 mg Oral Daily    bisacodyl (DULCOLAX) rectal suppository 10 mg  10 mg Rectal Daily PRN    cholecalciferol (VITAMIN D3) tablet 1,000 Units  1,000 Units Oral Daily    diclofenac sodium (VOLTAREN) 1 % topical gel 2 g  2 g Topical 4x Daily    fluticasone (FLOVENT HFA) 110 MCG/ACT inhaler 1 puff  1 puff Inhalation Q12H [address]    furosemide (LASIX) tablet 40 mg  40 mg Oral Daily    gabapentin (NEURONTIN) capsule 200 mg  200 mg Oral BID    insulin glargine (LANTUS) subcutaneous injection 12 Units 0 12 mL  12 Units Subcutaneous QAM    insulin glargine (LANTUS) subcutaneous injection 8 Units 0 08 mL  8 Units Subcutaneous HS    insulin lispro (HumaLOG) 100 units/mL subcutaneous injection 1-5 Units  1-5 Units Subcutaneous TID AC    insulin lispro (HumaLOG) 100 units/mL subcutaneous injection 1-5 Units  1-5 Units Subcutaneous HS    labetalol (NORMODYNE) injection 5 mg  5 mg Intravenous Q6H PRN    levETIRAcetam (KEPPRA) tablet 750 mg  750 mg Oral Daily    levothyroxine tablet 100 mcg  100 mcg Oral Early Morning    lisinopril (ZESTRIL) tablet 10 mg  10 mg Oral Daily    magnesium oxide (MAG-OX) tablet 400 mg  400 mg Oral BID    methocarbamol (ROBAXIN) tablet 250 mg  250 mg Oral Q6H PRN    oxyCODONE (ROXICODONE) IR tablet 5 mg  5 mg Oral Q3H PRN    Or    oxyCODONE (ROXICODONE) immediate release tablet 10 mg  10 mg Oral Q3H PRN    polyethylene glycol (MIRALAX) packet 17 g  17 g Oral Daily    prochlorperazine (COMPAZINE) tablet 5 mg  5 mg Oral Q6H PRN    rivaroxaban (XARELTO) tablet 20 mg  20 mg Oral Daily With Breakfast    senna-docusate sodium (SENOKOT S) 8 6-50 mg per tablet 1 tablet  1 tablet Oral HS    sodium phosphate-biphosphate (FLEET) enema 1 enema  1 enema Rectal Once PRN         Risks, benefits and possible side effects of Medications:     Risks, benefits, and possible side effects of medications explained to patient and patient verbalizes understanding  Labs: I have personally reviewed all pertinent laboratory results       Lab Results   Component Value Date    WBC 9 23 09/23/2018    HGB 9 3 (L) 09/23/2018    HCT 30 6 (L) 09/23/2018    MCV 86 09/23/2018     09/23/2018     Lab Results   Component Value Date    GLUCOSE 104 09/12/2018    CALCIUM 8 2 (L) 09/23/2018     09/23/2018    K 4 3 09/23/2018    CO2 28 09/23/2018     09/23/2018    BUN 32 (H) 09/23/2018    CREATININE 0 78 09/23/2018         Beau Rubin MD

## 2018-09-24 NOTE — DISCHARGE SUMMARY
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 ([place] [address] ) [I70 269]    Principle/ Secondary Diagnosis:  · PAD w/ left GT wet gangrene  · PAD w/ right foot dry gangrene  · PostOp acute blood loss anemia-- Procedural/ dilutional loss-- requiring transfusion  · Sacral wound, stage 3  · PostOp Urinary retention- Multifactorial  · Adjustment disorder w/ depressed mood  · Acute on chronic pain    Past Medical History:   Diagnosis Date    Altered mental status     Anemia     Anticoagulated     Xarelto for Afib/ flutter    Asthma     Atrial flutter (HCC)     maintained on anticoag w/ Xarelto    Bradycardia     CAD (coronary artery disease)     Candidiasis     Carotid stenosis, bilateral     Cataract     Chest pain     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) (HCC)     Chronic fatigue syndrome     Chronic low back pain     Chronic ulcer of toes (HCC)     left and right    Concussion w loss of consciousness of unsp duration, init     CVA (cerebral vascular accident) ([place] [address] ) 4/17/2018    Diabetes mellitus ([place] [address] )     type 2, insulin dependent    DJD (degenerative joint disease)     Expressive aphasia     Foot pain     GERD (gastroesophageal reflux disease)     Herpes zoster     HLD (hyperlipidemia)     HTN (hypertension)     Hypothyroidism     Irritable bowel syndrome     Kidney stone     Lumbar radiculopathy     Lyme disease     Dx in hospital 8/2011    MI (myocardial infarction) ([place] [address] )  Migraines     Muscle spasm     Non-neoplastic nevus     Nontoxic multinodular goiter     NSVT (nonsustained ventricular tachycardia) (HCC)     Osteoarthritis     Other chronic pain     PAD (peripheral artery disease) (HCC)     Palpitations     Paroxysmal atrial fibrillation (HCC)     Pseudogout     Pulmonary hypertension (HCC)     S/P TAVR (transcatheter aortic valve replacement)     Seizures (HCC)     Severe sepsis ([facility] )     Spinal stenosis     Stroke ([facility] ) 05/2018    Transient cerebral ischemia     Trigger ring finger     Viral gastroenteritis      Past Surgical History:   Procedure Laterality Date    APPENDECTOMY      ARTERIOGRAM  12/19/2017    CARDIAC CATHETERIZATION      CHOLECYSTECTOMY      COLONOSCOPY      CORONARY ARTERY BYPASS GRAFT  2004    IR ABDOMINAL ANGIOGRAPHY / INTERVENTION  8/10/2018    IR ABDOMINAL ANGIOGRAPHY / INTERVENTION  8/21/2018    LEG AMPUTATION THROUGH LOWER TIBIA AND FIBULA Left 9/17/2018    Procedure: AMPUTATION BELOW KNEE (BKA); Surgeon: Christian Sylvester MD;  [facility];  Service: Vascular    LUMBAR LAMINECTOMY      TN ECHO TRANSESOPHAG R-T 2D W/PRB IMG ACQUISJ I&R N/A 4/3/2018    Procedure: TRANSESOPHAGEAL ECHOCARDIOGRAM (ROBB); Surgeon: Priyanka Gonzalez DO;  [facility];  Service: Cardiac Surgery    TN REPLACE AORTIC VALVE OPENFEMORAL ARTERY APPROACH N/A 4/3/2018    Procedure: REPLACEMENT AORTIC VALVE TRANSCATHETER (TAVR) TRANSFEMORAL w/ 26MM IVY YEVGENIY S3 VALVE;  Surgeon: Priyanka Gonzalez DO;  [facility];  Service: Cardiac Surgery    TN VEIN BYPASS GRAFT,TIB-PERONEAL Left 9/12/2018    Procedure: Exploration of posterior tibial, medial, and lateral plantar arteries  ;  Surgeon: Hilda Madrid MD;  [facility];  Service: Vascular    THYROIDECTOMY      TOTAL ABDOMINAL HYSTERECTOMY W/ BILATERAL SALPINGOOPHORECTOMY         Procedures Performed:   · 9/12/18 Left plantar artery exploration- Hilliard  · 9/17/18 Left BKA- Cordell  · Serial HBOT for right foot gangrene    Laboratory data at discharge:     Results from last 7 days  Lab Units 09/23/18  0458 09/22/18  0506 09/19/18  0344   WBC Thousand/uL 9 23 8 96 11 42*   HEMOGLOBIN g/dL 9 3* 9 3* 8 9*   HEMATOCRIT % 30 6* 30 1* 28 6*   PLATELETS Thousands/uL 238 239 236       Results from last 7 days  Lab Units 09/23/18  0458 09/22/18  0506 09/19/18  0344   SODIUM mmol/L 137 136 137   POTASSIUM mmol/L 4 3 4 2 3 8   CHLORIDE mmol/L 104 104 103   CO2 mmol/L 28 29 28   BUN mg/dL 32* 26* 21   CREATININE mg/dL 0 78 0 78 0 89   CALCIUM mg/dL 8 2* 8 1* 8 0*       Results from last 7 days  Lab Units 09/19/18  0344 09/18/18  1907 09/18/18  0446   INR   --   --  1 28*   PTT seconds 45* 120* 37*           Discharge instructions/Information to patient and family:   See after visit summary for information provided to patient and family  · Mccormick catheter as per [facility] (multifactorial postop urinary retention)    Discharge Medications:  See after visit summary for reconciled discharge medications provided to patient and family  · Xarelto (Afib/ flutter)    · Cont'd statin- Lipitor      Hospital Course:   Nelle Oppenheim is an [de-identified] female w/ PAD and left foot tissue loss who had been evaluated in the outpatient setting with plan for possible exploration of the plantar system with possible popliteal to plantar bypass  Unfortunately, she presented to the hospital on 09/07/2018 with left foot wet gangrene of the 1st toe with cellulitis  She was admitted to the hospital   She was placed on antibiotic therapy for the cellulitis in the form of Keflex  She was evaluated by our podiatry colleagues with plan for eventual podiatric surgical intervention once vascular status had been improved  Her anticoagulation with Xarelto was held  She was placed on heparin drip bridge therapy       On 09/12/2018, the patient was taken to the operating room at which time she underwent exploration of the left posterior, medial, and lateral plantar arteries by Dr Izzy Olguin  Unfortunately, the arteries were found to be unsuitable for revascularization  Postoperatively, the patient was maintained in the PACU then transferred to a medical-surgical/telemetry/step-down floor  With no suitable distal targets for bypass/revascularization, discussion was had with the patient and her family regarding need for amputation  With medical maximization along with the assistance of our internal medicine colleagues, the patient returned to the operating room on 09/17/2018 at which time she underwent left BKA by Dr Aissatou Ling  Again, postoperatively, she was maintained in the PACU then transferred to medical-surgical/telemetry/step-down unit where she continued to convalesce for the remainder of her hospitalization  By the date of discharge, she remained in stable condition  Her left BKA wound was healing well  She was tolerating a diet  Her pain was controlled with multidrug regimen including narcotics  She was evaluated by Physical therapy and Occupational therapy who deemed her appropriate for rehab  With the assistance of our case management colleagues, arrangements were made for the patient to transfer to [address] [place] [address] for further convalescence  She was discharged for further rehab services on 09/24/2018  Hospital course was complicated by the following:  · Acute on chronic pain  Consultation was placed to our acute Pain service for assistance in management of both her chronic and acute pain  · Postoperative acute blood loss anemia secondary to procedural and dilutional loss requiring transfusion  · Postoperative urinary retention- Multifactorial  With failed void trial x2, Mccormick catheter was placed  Consultation was placed to our urology colleagues for further  Urology assessment revealed no baseline urinary symptoms, prior to admission    Likely her urinary retention was secondary to recent administration of anesthesia, narcotics, recumbent the, and constipation  Urinary retention may be chronic in light of patient's history of incontinence which may be related to overflow  As well, diabetic neuropathy with long-term negative impact on detrusor muscles may have been playing a role  Decision was made for Mccormick catheter to be maintained in to the rehab setting at which time decision would be made for appropriate timing of a void trial and urological follow-up  Prior to discharge, the patient was given instructions for outpatient care and follow-up  The patient has been instructed to call w/ any questions, changes, or concerns for the medical condition  · Right foot dry gangrene  As above, consultation was placed to our podiatry colleagues  From a right foot wound standpoint, her wounds remained dry and stable  She underwent serial hyperbaric oxygen therapy treatment  Unfortunately, due to difficulty with rehab placement along with need for continued hyperbaric oxygen therapy, treatments were placed on hold until rehab process completed  She will continue with local wound care  · Sacral wound, Stage 3  Consultation placed to our wound Care colleagues  · Adjustment disorder with depressed mood  With chronic pain, and recent amputation, the patient may a comments about no longer wanting to stop her, being better off being dad, and not wanting to be a burden to her children  With the statements, consultation was placed to our behavior health colleagues for evaluation  She was not felt to be suicidal  nor in need of any 1:1 observation  She declined medication treatment  For further details of the hospitalization, please refer to the medical record  Condition at Discharge: stable     Provisions for Follow-Up Care:  See after visit summary for information related to follow-up care and any pertinent home health orders        Disposition: Skilled nursing facility at [facility]    Planned Readmission: No    Discharge Statement   I spent 30 minutes discharging the patient  This time was spent on the day of discharge  I had direct contact with the patient on the day of discharge  Additional documentation is required if more than 30 minutes were spent on discharge       Eneida Fischer PA-C  9/24/2018

## 2018-09-24 NOTE — PLAN OF CARE
Problem: OCCUPATIONAL THERAPY ADULT  Goal: Performs self-care activities at highest level of function for planned discharge setting  See evaluation for individualized goals  Treatment Interventions: ADL retraining, Functional transfer training, Endurance training, Patient/family training, Equipment evaluation/education, Compensatory technique education, Energy conservation          See flowsheet documentation for full assessment, interventions and recommendations  Outcome: Progressing  Limitation: Decreased ADL status, Decreased endurance, Decreased self-care trans, Decreased high-level ADLs  Prognosis: Good  Assessment: Pt participated in occupational therapy with focus on activity tolerance,  bed mob, unsupported sitting balance and tolerance for pt engagement in functional self-care task/UB and LB self-care and functional transfers  Pt cleared by TOMY/Jarett  for pt participation in occupational  therapy  Pt received sitting edge of pt bed eating breakfast at slow pace and agreeable to therapy following pt Identifiers confirmed/pt also known to OT from previous OT session  Pt reported her goal to get a full nights sleep and to get out of the hospital  Pt required increased time allowed for her AM self-care 2* pt tangential/pt required frequent re-direct to task  Pt required assist for functional transfer 2* pt decreased overall strength  Pt will require in-pt rehab to continue to address pt noted deficits with decreased strength, coordination and balance which currently impair pt ADL and functional mob        OT Discharge Recommendation: Short Term Rehab  OT - OK to Discharge: Yes (when medically stable)

## 2018-09-24 NOTE — MEDICAL STUDENT
MEDICAL STUDENT  Inpatient Progress Note for TRAINING ONLY  Not Part of Legal Medical Record       Progress Note - Saroj Ferraro [de-identified] y o  female MRN: [id number]    Unit/Bed#: [facility] 527-01 Encounter: [id number]      Assessment:  [de-identified] y/o F s/p L BKA, R 3rd toe gangrene, R foot eschar  Plan:  L BKA 2/2 to PVD  - Management per vascular surgery  - Patient states that she has been in significant pain O/N    R 3rd toe gangrene, R foot eschar  - Management per podiatry  Unfortunately due to difficulty placement, podiatry recommends continuing hyperbaric O2 treatment outpatient post rehab  DM type 2  - Patient's glucose has been appropriately controlled while in the hospital; ranges from 120-170    - Continue Lantus 12 mg am and 8 mg pm    - Continue insulin sliding scale  HTN  - Continue patients 10 mg amlodipine  Decreased voiding  - Urology consulted  Recommended discharging patient to O/P rehab with indwelling catheter  Void trial 1 week into rehab  Subjective:   Patient complains of significant L stump pain overnight, stating the pain was a 15/10  She denies N/V, fevers, chills, SOB, chest pain, and abdominal pain  Her last BM was on the 21st, 3 days ago  She has an indwelling catheter in place for failing voiding trials  Objective:     Vitals: Blood pressure 143/67, pulse 61, temperature 98 1 °F (36 7 °C), temperature source Oral, resp  rate 18, height 5' 4" (1 626 m), weight 67 8 kg (149 lb 7 6 oz), SpO2 97 %, not currently breastfeeding  ,Body mass index is 25 66 kg/m²  Intake/Output Summary (Last 24 hours) at 09/24/18 0828  Last data filed at 09/24/18 0749   Gross per 24 hour   Intake              390 ml   Output             1300 ml   Net             -910 ml     Physical Exam   Constitutional: She is oriented to person, place, and time  Vital signs are normal  She appears well-developed and well-nourished  HENT:   Head: Normocephalic and atraumatic     Cardiovascular: Normal rate, regular rhythm, S1 normal, S2 normal and normal heart sounds  Pulmonary/Chest: Effort normal and breath sounds normal  She has no wheezes  She has no rales  Abdominal: Soft  Bowel sounds are normal  She exhibits no distension  There is no tenderness  Neurological: She is alert and oriented to person, place, and time  Invasive Devices     Drain            Urethral Catheter Latex 18 Fr  4 days                Lab, Imaging and other studies:  Recent Results (from the past 24 hour(s))   Fingerstick Glucose (POCT)    Collection Time: 09/23/18 10:49 AM   Result Value Ref Range    POC Glucose 173 (H) 65 - 140 mg/dl   Fingerstick Glucose (POCT)    Collection Time: 09/23/18  4:11 PM   Result Value Ref Range    POC Glucose 145 (H) 65 - 140 mg/dl   Fingerstick Glucose (POCT)    Collection Time: 09/23/18  8:54 PM   Result Value Ref Range    POC Glucose 173 (H) 65 - 140 mg/dl   Fingerstick Glucose (POCT)    Collection Time: 09/24/18  7:38 AM   Result Value Ref Range    POC Glucose 120 65 - 140 mg/dl     XR chest portable   Final Result by Mariana Fabry, DO (09/12 1253)   1  Diminished inspiration  Mild vascular congestion  2   Right internal jugular central line in satisfactory position  Workstation performed: [id number]         XR chest pa & lateral   Final Result by Hipolito Starks MD (09/08 0254)      Mild pulmonary vascular congestion and cardiomegaly raise concern for CHF  Workstation performed: [id number]         XR foot 3+ vw left   Final Result by Mayra Anderson MD (09/07 4453)      Diffuse swelling throughout the foot  No evidence of gas or collection  No radiographic evidence of osteomyelitis  Workstation performed: [id number]         XR shoulder 2+ views LEFT   ED Interpretation by Violet Brannon MD (09/07 3204)   The shoulder was ordered by resident and interpreted by me independently     On my read, it appears:   - arthritic changes   - no obvious fracture      Final Result by Bobo Baker MD (09/07 6531)      Relatively prominent ossific density projecting between acromion and humeral head felt to most likely be a large bone spur  This might be impinging on rotator cuff    Suggest further workup with MRI            Workstation performed: [id number]              VTE Pharmacologic Prophylaxis: Heparin  VTE Mechanical Prophylaxis: reason for no mechanical VTE prophylaxis dry foot gangrene

## 2018-09-24 NOTE — PROGRESS NOTES
Vascular Surgery    Progress Note - Leopoldo Buckles 1940, [de-identified] y o  female MRN: [id number]    Unit/Bed#: 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 ARC        Urinary retention   Assessment & Plan    Failed void trial    Plan:  --Continue Mccormick on discharge  --Outpatient f/u with urology          Subjective:  Pt resting comfortably    Vitals:  /73 (BP Location: Left arm)   Pulse 58   Temp 97 6 °F (36 4 °C) (Oral)   Resp 20   Ht 5' 4" (1 626 m)   Wt 67 8 kg (149 lb 7 6 oz)   LMP  (LMP Unknown)   SpO2 95%   BMI 25 66 kg/m²     I/Os:  I/O last 3 completed shifts:   In: 990 [P O :990]  Out: 1575 [Urine:1575]  I/O this shift:  In: -   Out: 175 [Urine:175]    Lab Results and Cultures:   Lab Results   Component Value Date    WBC 9 23 09/23/2018    HGB 9 3 (L) 09/23/2018    HCT 30 6 (L) 09/23/2018    MCV 86 09/23/2018     09/23/2018     Lab Results   Component Value Date    GLUCOSE 104 09/12/2018    CALCIUM 8 2 (L) 09/23/2018     09/23/2018    K 4 3 09/23/2018    CO2 28 09/23/2018     09/23/2018    BUN 32 (H) 09/23/2018    CREATININE 0 78 09/23/2018     Lab Results   Component Value Date    INR 1 28 (H) 09/18/2018    INR 1 52 (H) 09/09/2018    INR 1 77 (H) 09/08/2018    PROTIME 16 1 (H) 09/18/2018    PROTIME 18 4 (H) 09/09/2018    PROTIME 20 7 (H) 09/08/2018        Blood Culture:   Lab Results   Component Value Date    BLOODCX No Growth After 5 Days  08/08/2018    BLOODCX No Growth After 5 Days   08/08/2018   ,   Urinalysis:   Lab Results   Component Value Date    COLORU Yellow 05/18/2018    COLORU Yellow 12/21/2016    CLARITYU Clear 05/18/2018    CLARITYU Transparent 12/21/2016    SPECGRAV 1 010 05/18/2018    SPECGRAV 1 010 12/21/2016    PHUR 5 0 05/18/2018    PHUR 8 12/21/2016    LEUKOCYTESUR Small (A) 05/18/2018    LEUKOCYTESUR - 12/21/2016    NITRITE neg 06/12/2018    NITRITE Negative 05/18/2018    NITRITE - 12/21/2016    PROTEINUA Negative 05/18/2018    PROTEINUA - 12/21/2016    GLUCOSEU neg 06/12/2018    GLUCOSEU Negative 05/18/2018    GLUCOSEU Negative 05/28/2015    KETONESU neg 06/12/2018    KETONESU Negative 05/18/2018    KETONESU - 12/21/2016    BILIRUBINUR Negative 05/18/2018    BILIRUBINUR Negative 05/28/2015    BLOODU Negative 05/18/2018    BLOODU - 12/21/2016   ,   Urine Culture:   Lab Results   Component Value Date    URINECX >100,000 cfu/ml Proteus mirabilis (A) 06/12/2018   ,   Wound Culure:   Lab Results   Component Value Date    WOUNDCULT 4+ Growth of Pseudomonas aeruginosa (A) 09/11/2018    WOUNDCULT 4+ Growth of Klebsiella oxytoca (A) 09/11/2018    WOUNDCULT 4+ Growth of Staphylococcus aureus (A) 09/11/2018       Medications:  Current Facility-Administered Medications   Medication Dose Route Frequency    acetaminophen (TYLENOL) tablet 650 mg  650 mg Oral Q6H [address]    albuterol (PROVENTIL HFA,VENTOLIN HFA) inhaler 1 puff  1 puff Inhalation Q4H PRN    amiodarone tablet 200 mg  200 mg Oral BID    amLODIPine (NORVASC) tablet 10 mg  10 mg Oral Daily    atorvastatin (LIPITOR) tablet 80 mg  80 mg Oral Daily    bisacodyl (DULCOLAX) EC tablet 10 mg  10 mg Oral Daily    bisacodyl (DULCOLAX) rectal suppository 10 mg  10 mg Rectal Daily PRN    cholecalciferol (VITAMIN D3) tablet 1,000 Units  1,000 Units Oral Daily    diclofenac sodium (VOLTAREN) 1 % topical gel 2 g  2 g Topical 4x Daily    fluticasone (FLOVENT HFA) 110 MCG/ACT inhaler 1 puff  1 puff Inhalation Q12H [address]    furosemide (LASIX) tablet 40 mg  40 mg Oral Daily    gabapentin (NEURONTIN) capsule 200 mg  200 mg Oral BID    insulin glargine (LANTUS) subcutaneous injection 12 Units 0 12 mL  12 Units Subcutaneous QAM    insulin glargine (LANTUS) subcutaneous injection 8 Units 0 08 mL  8 Units Subcutaneous HS    insulin lispro (HumaLOG) 100 units/mL subcutaneous injection 1-5 Units  1-5 Units Subcutaneous TID AC    insulin lispro (HumaLOG) 100 units/mL subcutaneous injection 1-5 Units  1-5 Units Subcutaneous HS    labetalol (NORMODYNE) injection 5 mg  5 mg Intravenous Q6H PRN    levETIRAcetam (KEPPRA) tablet 750 mg  750 mg Oral Daily    levothyroxine tablet 100 mcg  100 mcg Oral Early Morning    lisinopril (ZESTRIL) tablet 10 mg  10 mg Oral Daily    magnesium oxide (MAG-OX) tablet 400 mg  400 mg Oral BID    methocarbamol (ROBAXIN) tablet 250 mg  250 mg Oral Q6H PRN    oxyCODONE (ROXICODONE) IR tablet 5 mg  5 mg Oral Q3H PRN    Or    oxyCODONE (ROXICODONE) immediate release tablet 10 mg  10 mg Oral Q3H PRN    polyethylene glycol (MIRALAX) packet 17 g  17 g Oral Daily    prochlorperazine (COMPAZINE) tablet 5 mg  5 mg Oral Q6H PRN    rivaroxaban (XARELTO) tablet 20 mg  20 mg Oral Daily With Breakfast    senna-docusate sodium (SENOKOT S) 8 6-50 mg per tablet 1 tablet  1 tablet Oral HS    sodium phosphate-biphosphate (FLEET) enema 1 enema  1 enema Rectal Once PRN     Physical Exam:    General appearance: alert and oriented, in no acute distress  Lungs: clear to auscultation bilaterally  Heart: regular rate and rhythm, S1, S2 normal, no murmur, click, rub or gallop  Abdomen: soft, non-tender; bowel sounds normal; no masses,  no organomegaly  Extremities: S/P Left BKA, wound edges well approx, no necrosis, no erythema or drainage, Right foot with dry gangrene, warm, M/S intact    Pulse exam:  DP: Right: doppler signal  PT: Right: doppler signal      Dakota Roberson PA-C  9/24/2018

## 2018-09-24 NOTE — PROGRESS NOTES
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 [facility])  Active Problems:    Essential hypertension    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 native heart without angina pectoris    Type 2 diabetes mellitus with complication, with long-term current use of insulin ([facility])    Hyperlipidemia    Gastroesophageal reflux disease without esophagitis    Ulcer of toe of left foot (HCC)    Seizure (HCC)    Anemia    PAD (peripheral artery disease) ([facility])    Elevated INR    Hypothyroidism    Rotator cuff disorder, left    Wet gangrene ([facility])    Decubitus ulcer of sacral region, stage 3 ([address] )    Urinary retention   [de-identified]years old female with severe vascular disease status post left BKA  1   Severe peripheral vascular disease   - status post left BKA  - severe stump pain recommend Pain Service management  - inpatient rehab  2  History of DVT/PE  - continue Xarelto 20 mg daily  3  Coronary artery disease status post CABG  - continue atorvastatin and aspirin  4  Paroxysmal Afib  - amiodarone 20 mg b i d   - anticoagulation as above  5  Chronic systolic heart failure without exacerbation  - Lasix 40 mg daily orally  6  Hypertension  - amlodipine 10 mg daily  - lisinopril 10 mg daily  - labetalol 5 mg Q 6 p r n  for blood pressure more than 180  7  Acute kidney injury  - result  8  Type 2 diabetes mellitus  9  Stage III hospital-acquired decubitus ulcer  - 10 are reposition every 2 hours  - wound care per nursing and wound management      Disposition:  Awaiting placement      Subjective:   Patient is seen and examined at her bedside  Overnight she reported she had severe pain in her left stump rated 15/10, show she looks very upset this morning due to poor pain control denies nausea or vomiting  Vitals: Temp (24hrs), Av 9 °F (36 6 °C), Min:97 6 °F (36 4 °C), Max:98 1 °F (36 7 °C)  Current: Temperature: 98 1 °F (36 7 °C)  Vitals:    18 0728 18 1519 18 2240 18 0736   BP: 146/65 115/58 161/73 143/67   BP Location:  Left arm Left arm    Pulse: 57 74 58 61   Resp: 20 20 20 18   Temp: 97 9 °F (36 6 °C) 98 °F (36 7 °C) 97 6 °F (36 4 °C) 98 1 °F (36 7 °C)   TempSrc: Oral Oral Oral Oral   SpO2: 97% 96% 95% 97%   Weight:       Height:        Body mass index is 25 66 kg/m²  I/O last 24 hours: In: 390 [P O :390]  Out: 1300 [Urine:1300]      Physical Exam: /67   Pulse 61   Temp 98 1 °F (36 7 °C) (Oral)   Resp 18   Ht 5' 4" (1 626 m)   Wt 67 8 kg (149 lb 7 6 oz)   LMP  (LMP Unknown)   SpO2 97%   BMI 25 66 kg/m²   General appearance: alert and oriented, in no acute distress and Looks angry  Eyes: Intact extraocular muscle  Lungs: clear to auscultation bilaterally  Heart:  Regular rate and rhythm systolic murmur  Abdomen: soft, non-tender; bowel sounds normal; no masses,  no organomegaly  Extremities: Left BKA, right foot with gangrene at 1st metatarsophalangeal joint medial side  Neurologic: Grossly normal     Invasive Devices     Drain            Urethral Catheter Latex 18 Fr  4 days                          Labs:   Recent Results (from the past 24 hour(s))   Fingerstick Glucose (POCT)    Collection Time: 18  4:11 PM   Result Value Ref Range    POC Glucose 145 (H) 65 - 140 mg/dl   Fingerstick Glucose (POCT)    Collection Time: 18  8:54 PM   Result Value Ref Range    POC Glucose 173 (H) 65 - 140 mg/dl   Fingerstick Glucose (POCT)    Collection Time: 18  7:38 AM   Result Value Ref Range    POC Glucose 120 65 - 140 mg/dl       Radiology Results: I have personally reviewed pertinent reports          Other Diagnostic Testing:   Renal function stable      Active Meds:   Current Facility-Administered Medications   Medication Dose Route Frequency    acetaminophen (TYLENOL) tablet 650 mg  650 mg Oral Q6H [address]    albuterol (PROVENTIL HFA,VENTOLIN HFA) inhaler 1 puff  1 puff Inhalation Q4H PRN    amiodarone tablet 200 mg  200 mg Oral BID    amLODIPine (NORVASC) tablet 10 mg  10 mg Oral Daily    atorvastatin (LIPITOR) tablet 80 mg  80 mg Oral Daily    bisacodyl (DULCOLAX) EC tablet 10 mg  10 mg Oral Daily    bisacodyl (DULCOLAX) rectal suppository 10 mg  10 mg Rectal Daily PRN    cholecalciferol (VITAMIN D3) tablet 1,000 Units  1,000 Units Oral Daily    diclofenac sodium (VOLTAREN) 1 % topical gel 2 g  2 g Topical 4x Daily    fluticasone (FLOVENT HFA) 110 MCG/ACT inhaler 1 puff  1 puff Inhalation Q12H [address]    furosemide (LASIX) tablet 40 mg  40 mg Oral Daily    gabapentin (NEURONTIN) capsule 200 mg  200 mg Oral BID    insulin glargine (LANTUS) subcutaneous injection 12 Units 0 12 mL  12 Units Subcutaneous QAM    insulin glargine (LANTUS) subcutaneous injection 8 Units 0 08 mL  8 Units Subcutaneous HS    insulin lispro (HumaLOG) 100 units/mL subcutaneous injection 1-5 Units  1-5 Units Subcutaneous TID AC    insulin lispro (HumaLOG) 100 units/mL subcutaneous injection 1-5 Units  1-5 Units Subcutaneous HS    labetalol (NORMODYNE) injection 5 mg  5 mg Intravenous Q6H PRN    levETIRAcetam (KEPPRA) tablet 750 mg  750 mg Oral Daily    levothyroxine tablet 100 mcg  100 mcg Oral Early Morning    lisinopril (ZESTRIL) tablet 10 mg  10 mg Oral Daily    magnesium oxide (MAG-OX) tablet 400 mg  400 mg Oral BID    methocarbamol (ROBAXIN) tablet 250 mg  250 mg Oral Q6H PRN    oxyCODONE (ROXICODONE) IR tablet 5 mg  5 mg Oral Q3H PRN    Or    oxyCODONE (ROXICODONE) immediate release tablet 10 mg  10 mg Oral Q3H PRN    polyethylene glycol (MIRALAX) packet 17 g  17 g Oral Daily    prochlorperazine (COMPAZINE) tablet 5 mg  5 mg Oral Q6H PRN    rivaroxaban (XARELTO) tablet 20 mg  20 mg Oral Daily With Breakfast    senna-docusate sodium (SENOKOT S) 8 6-50 mg per tablet 1 tablet  1 tablet Oral HS    sodium phosphate-biphosphate (FLEET) enema 1 enema  1 enema Rectal Once PRN         VTE Pharmacologic Prophylaxis: {Pharmacologic VTE Prophylaxis Xarelto  VTE Mechanical Prophylaxis:  Defer due to  [address]

## 2018-09-24 NOTE — OCCUPATIONAL THERAPY NOTE
Occupational Therapy Treatment Note     09/24/18 6780   Restrictions/Precautions   Weight Bearing Precautions Per Order Yes   RLE Weight Bearing Per Order WBAT  (in surgical shoe)   LLE Weight Bearing Per Order (BKA)   Other Precautions Fall Risk;Pain   Lifestyle   Autonomy Pt I w/ ADLs, IADLs, and mobility at baseline  Has DME but was not using it   Reciprocal Relationships Pt lives alone  She has supportive family nearby   Service to Others Retired   Intrinsic Gratification Enjoys racing cars   Pain Assessment   Pain Assessment 0-10   Pain Score No Pain   ADL   Where Assessed Edge of bed   Grooming Assistance 4  Minimal Assistance   Grooming Deficit Brushing hair   UB Bathing Assistance 2  Maximal Assistance   UB Bathing Deficit Supervision/safety;Right arm;Left arm; Chest   LB Bathing Assistance 2  Maximal Assistance   LB Bathing Deficit Buttocks; Perineal area   UB Dressing Assistance 3  Moderate Assistance   UB Dressing Deficit Thread RUE; Thread LUE;Pull around back   LB Dressing Assistance 2  Maximal Assistance   LB Dressing Deficit Thread RLE into pants; Thread LLE into pants;Pull up over hips   Bed Mobility   Sit to Supine 2  Maximal assistance   Transfers   Sit to Stand 3  Moderate assistance   Additional items Assist x 2   Stand to Sit 3  Moderate assistance   Additional items Assist x 2   Stand pivot Unable to assess   Cognition   Overall Cognitive Status [facility]   Arousal/Participation Alert; Responsive   Attention Within functional limits   Orientation Level Oriented X4   Memory Within functional limits   Following Commands Follows one step commands with increased time or repetition   Comments pt pleasant and required min encouargement /redirect to task for pt participation   Activity Tolerance   Activity Tolerance Patient tolerated treatment well   Assessment   Assessment Pt participated in occupational therapy with focus on activity tolerance,  bed mob, unsupported sitting balance and tolerance for pt engagement in functional self-care task/UB and LB self-care and functional transfers  Pt cleared by TOMY/Jarett  for pt participation in occupational  therapy  Pt received sitting edge of pt bed eating breakfast at slow pace and agreeable to therapy following pt Identifiers confirmed/pt also known to OT from previous OT session  Pt reported her goal to get a full nights sleep and to get out of the hospital  Pt required increased time allowed for her AM self-care 2* pt tangential/pt required frequent re-direct to task  Pt required assist for functional transfer 2* pt decreased overall strength  Pt will require in-pt rehab to continue to address pt noted deficits with decreased strength, coordination and balance which currently impair pt ADL and functional mob      Plan   Treatment Interventions ADL retraining   Goal Expiration Date 10/03/18   Treatment Day 3   OT Frequency 3-5x/wk   Recommendation   OT Discharge Recommendation Short Term Rehab   Barthel Index   Feeding 10   Bathing 0   Grooming Score 0   Dressing Score 5   Bladder Score 0   Bowels Score 10   Toilet Use Score 5   Transfers (Bed/Chair) Score 5   Mobility (Level Surface) Score 0   Stairs Score 0   Barthel Index Score 35   Modified Rae Scale   Modified Ransom Scale 4       Caitlin PEARLA/L

## 2018-09-24 NOTE — ASSESSMENT & PLAN NOTE
Severe PAD with Left foot gangrene, Right foot gangrene    ~S/P Left Plantar artery exploration (Vessel not suitable as target for bypass) 9/12   ~S/P Left BKA 9/17    Plan:  --Local L BKA wound care with daily cleansing and ACE wrap  --Continue Xarelto(Afib/flutter)  --Hyperbaric O2 to be held during rehabilitation   --Pain mgt  --Cont PT/OT and rehab planning   --Case Management for dispo planning ?  ARC

## 2018-09-24 NOTE — SOCIAL WORK
Cm received call from [facility] from [facility]  Pt accepted for admission and they spoke with pt's son Gordon Rutherford  Cm contacted [facility] and spoke with Sorin Andrade who scheduled BLS transport for 16:00  Vascular surgery made aware

## 2018-09-24 NOTE — PHYSICAL THERAPY NOTE
PHYSICAL THERAPY PROGRESS NOTE          Patient Name: Ximena Tello  YDGPB'C Date: 9/24/2018 09/24/18 0920   Pain Assessment   Pain Assessment 0-10   Pain Score No Pain  (no pain at rest, significant increase with activity)   Pain Type Acute pain;Surgical pain  (incisional pain)   Pain Location Leg   Pain Orientation Left   Hospital Pain Intervention(s) Repositioned; Ambulation/increased activity   Response to Interventions unchanged   Restrictions/Precautions   Weight Bearing Precautions Per Order Yes   RLE Weight Bearing Per Order WBAT  (in surgical shoe)   Other Precautions WBS; Multiple lines; Fall Risk;Pain   General   Chart Reviewed Yes   Response to Previous Treatment Patient with no complaints from previous session  Family/Caregiver Present No   Cognition   Overall Cognitive Status WFL   Arousal/Participation Alert; Responsive   Attention Attends with cues to redirect   Orientation Level Oriented X4   Following Commands Follows one step commands with increased time or repetition   Subjective   Subjective "I had a lot of pain last night, the medicine didn't do anything for it"   Bed Mobility   Rolling R 5  Supervision   Additional items Increased time required   Sit to Supine 4  Minimal assistance   Additional items Increased time required;Verbal cues;LE management   Transfers   Sit to Stand 4  Minimal assistance   Additional items Increased time required;Verbal cues   Stand to Sit 4  Minimal assistance   Additional items Increased time required;Verbal cues   Additional Comments sit to stand transfer completed 3x throughout session, pt unable to complete stand pivot transfer to bedside chair this session; pt unable to pivot on RLE secondary to knee pain and R foot pain   Ambulation/Elevation   Gait pattern Not tested   Balance   Static Sitting Good   Dynamic Sitting Fair +   Static Standing Fair   Dynamic Standing Poor + Endurance Deficit   Endurance Deficit Yes   Endurance Deficit Description pain   Activity Tolerance   Activity Tolerance Patient limited by pain   Nurse Made Aware yes   Exercises   Balance training  seated EOB static and dynamic balance activities devi UE support for 5 min and without UE support for 10 min   Assessment   Prognosis Good   Problem List Decreased strength;Decreased range of motion;Decreased endurance; Impaired balance;Decreased mobility; Decreased safety awareness; Impaired sensation;Decreased skin integrity;Orthopedic restrictions;Pain   Assessment Pt pariticipated in therapy session which was limited secondary to pt requiring extensive time to complete funcitonal mobility tasks and distractibility  Pt performed bed mobility with min A and verbal cues and increased time required  Pt performed sit to stand transfer x 3 throughout session with min A and verbal cues for technique and safety  Provided pt education regarding importance of positioning in bed to prevent contractures, goal of ACE wrapping residual limb, importance of participating in therapy session and increasing activity tolerance  Skilled physical therapy is indicated to address listed functional deficits focusing on strength, endurance and functional mobility  Barriers to Discharge Inaccessible home environment;Decreased caregiver support   Goals   Patient Goals get better   STG Expiration Date 10/01/18   Short Term Goal #1 Per PT eval 9/19: 1  Complete bed mobility with S to decrease need for caregiver support  2  Improve balance to good to decrease risk of falls during transfer  3  Complete sit pivot with S to improve I  4  Complete stand pivot with Crissy  5  I with W/C management x 150 to complete community mobility with I  6  Demonstrate ability to hop 10' with RW and Mod A  Treatment Day 4   Plan   Treatment/Interventions Functional transfer training;LE strengthening/ROM; Endurance training;Patient/family training; Bed mobility; Compensatory technique education;Spoke to nursing;OT   Progress Progressing toward goals   PT Frequency (3-5x/wk)   Recommendation   Recommendation Post acute IP rehab   Equipment Recommended Walker; Wheelchair   PT - OK to Discharge Yes  (to rehab when medically stable)     Hi Easton PTA

## 2018-09-24 NOTE — PLAN OF CARE
Problem: PHYSICAL THERAPY ADULT  Goal: Performs mobility at highest level of function for planned discharge setting  See evaluation for individualized goals  Treatment/Interventions: Bed mobility, Gait training, Patient/family training, LE strengthening/ROM, Functional transfer training, Endurance training, Spoke to nursing, OT          See flowsheet documentation for full assessment, interventions and recommendations  Outcome: Progressing  Prognosis: Good  Problem List: Decreased strength, Decreased range of motion, Decreased endurance, Impaired balance, Decreased mobility, Decreased safety awareness, Impaired sensation, Decreased skin integrity, Orthopedic restrictions, Pain  Assessment: Pt pariticipated in therapy session which was limited secondary to pt requiring extensive time to complete funcitonal mobility tasks and distractibility  Pt performed bed mobility with min A and verbal cues and increased time required  Pt performed sit to stand transfer x 3 throughout session with min A and verbal cues for technique and safety  Provided pt education regarding importance of positioning in bed to prevent contractures, goal of ACE wrapping residual limb, importance of participating in therapy session and increasing activity tolerance  Skilled physical therapy is indicated to address listed functional deficits focusing on strength, endurance and functional mobility  Barriers to Discharge: Inaccessible home environment, Decreased caregiver support     Recommendation: Post acute IP rehab     PT - OK to Discharge: Yes (to rehab when medically stable)    See flowsheet documentation for full assessment

## 2018-09-24 NOTE — DEATH NOTE
MEDICAL STUDENT  Inpatient Progress Note for TRAINING ONLY  Not Part of Legal Medical Record       Progress Note - Cathern Hamman [de-identified] y o  female MRN: [id number]    Unit/Bed#: [facility] 527-01 Encounter: [id number]      Assessment:  [de-identified] y/o F s/p L BKA, R 3rd toe gangrene, R foot eschar  Plan:  L BKA 2/2 to PVD  - Management per vascular surgery  - Patient states that she has been in significant pain O/N    R 3rd toe gangrene, R foot eschar  - Management per podiatry  Unfortunately due to difficulty placement, podiatry recommends continuing hyperbaric O2 treatment outpatient post rehab  DM type 2  - Patient's glucose has been appropriately controlled while in the hospital; ranges from 120-170    - Continue Lantus 12 mg am and 8 mg pm    - Continue insulin sliding scale  HTN  - Continue patients 10 mg amlodipine  Decreased voiding  - Urology consulted  Recommended discharging patient to O/P rehab with indwelling catheter  Void trial 1 week into rehab  Subjective:   Patient complains of significant L stump pain overnight, stating the pain was a 15/10  She denies N/V, fevers, chills, SOB, chest pain, and abdominal pain  Her last BM was on the 21st, 3 days ago  She has an indwelling catheter in place for failing voiding trials  Objective:     Vitals: Blood pressure 143/67, pulse 61, temperature 98 1 °F (36 7 °C), temperature source Oral, resp  rate 18, height 5' 4" (1 626 m), weight 67 8 kg (149 lb 7 6 oz), SpO2 97 %, not currently breastfeeding  ,Body mass index is 25 66 kg/m²  Intake/Output Summary (Last 24 hours) at 09/24/18 0828  Last data filed at 09/24/18 0749   Gross per 24 hour   Intake              390 ml   Output             1300 ml   Net             -910 ml     Physical Exam   Constitutional: She is oriented to person, place, and time  Vital signs are normal  She appears well-developed and well-nourished  HENT:   Head: Normocephalic and atraumatic     Cardiovascular: Normal rate, regular rhythm, S1 normal, S2 normal and normal heart sounds  Pulmonary/Chest: Effort normal and breath sounds normal  She has no wheezes  She has no rales  Abdominal: Soft  Bowel sounds are normal  She exhibits no distension  There is no tenderness  Neurological: She is alert and oriented to person, place, and time  Invasive Devices     Drain            Urethral Catheter Latex 18 Fr  4 days                Lab, Imaging and other studies:  Recent Results (from the past 24 hour(s))   Fingerstick Glucose (POCT)    Collection Time: 09/23/18 10:49 AM   Result Value Ref Range    POC Glucose 173 (H) 65 - 140 mg/dl   Fingerstick Glucose (POCT)    Collection Time: 09/23/18  4:11 PM   Result Value Ref Range    POC Glucose 145 (H) 65 - 140 mg/dl   Fingerstick Glucose (POCT)    Collection Time: 09/23/18  8:54 PM   Result Value Ref Range    POC Glucose 173 (H) 65 - 140 mg/dl   Fingerstick Glucose (POCT)    Collection Time: 09/24/18  7:38 AM   Result Value Ref Range    POC Glucose 120 65 - 140 mg/dl     XR chest portable   Final Result by Nina Robledo DO (09/12 1253)   1  Diminished inspiration  Mild vascular congestion  2   Right internal jugular central line in satisfactory position  Workstation performed: [id number]         XR chest pa & lateral   Final Result by Rachael Martinez MD (09/08 0254)      Mild pulmonary vascular congestion and cardiomegaly raise concern for CHF  Workstation performed: [id number]         XR foot 3+ vw left   Final Result by Laurence Mendieta MD (09/07 9216)      Diffuse swelling throughout the foot  No evidence of gas or collection  No radiographic evidence of osteomyelitis  Workstation performed: [id number]         XR shoulder 2+ views LEFT   ED Interpretation by Deniz Manriquez MD (09/07 3648)   The shoulder was ordered by resident and interpreted by me independently     On my read, it appears:   - arthritic changes   - no obvious fracture      Final Result by Tee Cannon MD (09/07 1898)      Relatively prominent ossific density projecting between acromion and humeral head felt to most likely be a large bone spur  This might be impinging on rotator cuff    Suggest further workup with MRI            Workstation performed: [id number]              VTE Pharmacologic Prophylaxis: Heparin  VTE Mechanical Prophylaxis: reason for no mechanical VTE prophylaxis dry foot gangrene

## 2018-09-25 ENCOUNTER — TELEPHONE (OUTPATIENT)
Dept: INTERNAL MEDICINE CLINIC | Facility: CLINIC | Age: 78
End: 2018-09-25

## 2018-09-25 ENCOUNTER — TRANSITIONAL CARE MANAGEMENT (OUTPATIENT)
Dept: INTERNAL MEDICINE CLINIC | Facility: CLINIC | Age: 78
End: 2018-09-25

## 2018-09-25 LAB
GLUCOSE SERPL-MCNC: 149 MG/DL (ref 65–140)
GLUCOSE SERPL-MCNC: 160 MG/DL (ref 65–140)
GLUCOSE SERPL-MCNC: 171 MG/DL (ref 65–140)
GLUCOSE SERPL-MCNC: 197 MG/DL (ref 65–140)
GLUCOSE SERPL-MCNC: 202 MG/DL (ref 65–140)

## 2018-09-25 NOTE — TELEPHONE ENCOUNTER
Patient was admitted to [facility] on 9/7/18 for leg pain, wet gangrene, PAD, LEONARD,, atherosclerosis of artery of extremity with gangrene  On 9/12/18 she had a left plantar artery exploration and on 9/17/18, she had a Left BKA  She was discharged to [place] [id number] on 9/24/18 m I called and spoke with Carey, she will have records forwarded to [facility] upon discharge from rehab  They will call to schedule a TCM appt upon discharge  Medication list was updated with discharge instructions from B on 9/24/18

## 2018-09-26 ENCOUNTER — TELEPHONE (OUTPATIENT)
Dept: VASCULAR SURGERY | Facility: CLINIC | Age: 78
End: 2018-09-26

## 2018-09-26 NOTE — TELEPHONE ENCOUNTER
Procedure:AMPUTATION BELOW KNEE (BKA) (Left Leg Upper)    Date of Procedure:   9/17/18                             Surgeon: Dr Davis Yanes    Discharge Date: 9/24      Chest pain?: no    Shortness of Breath?: no    Increased pain, swelling, warmth, or redness?: no    Pus draining from the incision?: no    Foul- smelling drainage?: no    Signs of dehiscence? no    Fever/Chills?:no    Bleeding? no    Blood thinners/Antiplatelet?: xarelto    Pain Controlled?: Yes      NEXT OFFICE VISIT SCHEDULED: 10/16/18 Dr Davis Yanes      Any further questions/concerns? : Spoke to nurse Hall at [place] [address]  No further questions or concerns at this time

## 2018-10-16 ENCOUNTER — OFFICE VISIT (OUTPATIENT)
Dept: VASCULAR SURGERY | Facility: CLINIC | Age: 78
End: 2018-10-16

## 2018-10-16 VITALS
BODY MASS INDEX: 25.66 KG/M2 | DIASTOLIC BLOOD PRESSURE: 60 MMHG | HEIGHT: 64 IN | HEART RATE: 82 BPM | TEMPERATURE: 97.3 F | RESPIRATION RATE: 18 BRPM | SYSTOLIC BLOOD PRESSURE: 96 MMHG

## 2018-10-16 DIAGNOSIS — I70.245 ATHEROSCLEROSIS OF NATIVE ARTERY OF BOTH LOWER EXTREMITIES WITH BILATERAL ULCERATION OF OTHER PART OF FEET (HCC): Primary | ICD-10-CM

## 2018-10-16 DIAGNOSIS — Z89.512 S/P BKA (BELOW KNEE AMPUTATION) UNILATERAL, LEFT (HCC): ICD-10-CM

## 2018-10-16 DIAGNOSIS — I70.235 ATHEROSCLEROSIS OF NATIVE ARTERY OF BOTH LOWER EXTREMITIES WITH BILATERAL ULCERATION OF OTHER PART OF FEET (HCC): Primary | ICD-10-CM

## 2018-10-16 PROCEDURE — 99024 POSTOP FOLLOW-UP VISIT: CPT | Performed by: NURSE PRACTITIONER

## 2018-10-16 RX ORDER — CEPHALEXIN 500 MG/1
500 CAPSULE ORAL EVERY 12 HOURS SCHEDULED
Qty: 20 CAPSULE | Refills: 0 | Status: SHIPPED | OUTPATIENT
Start: 2018-10-16 | End: 2018-10-26

## 2018-10-16 NOTE — LETTER
October 16, 2018     Arturo Rojo [address]    Patient: Les Lin   YOB: 1940   Date of Visit: 10/16/2018       Dear Dr Landrum Mt:    Thank you for referring Tarik Mauricio to me for evaluation  Below are my notes for this consultation  If you have questions, please do not hesitate to call me  I look forward to following your patient along with you           Sincerely,        RAMO Mohan        CC: No Recipients

## 2018-10-16 NOTE — PROGRESS NOTES
Assessment/Plan:    [de-identified] y/o F with HTN, HLD, CKD III, CAD s/p CABG, TAVR, and severe PAD with bilateral foot gangrene, s/p L BKA 9/17/18 by Dr Belkis Urrutia, s/p RLE angiogram with angioplasty/recanalization of peroneal   S/P BKA (below knee amputation) unilateral, left (HCC)  -L BKA with necrosis along incision/flap site, non-palpable popliteal pulse, easily palpable R femoral pulse  Will need to monitor closely  -Erythema and warm  Will treat with course of Keflex  -Followup in 10 days for recheck of stump site  Notify office with any signs worsening infection/fever/chills    Atherosclerosis of native artery of both lower extremities with bilateral ulceration (HCC)  -Right foot dry gangrene  -s/p RLE angiogram with successful recanalization of peroneal artery, chronically occluded R AT/PT with reconstitution of DP and PT via collaterals  -Remains high risk for limb loss  -Continue local wound care with Betadine paint  -Was completing hyperbaric oxygen therapy, currently on hold due to rehab placement       Diagnoses and all orders for this visit:    Atherosclerosis of native artery of both lower extremities with bilateral ulceration of other part of feet (HCC)    S/P BKA (below knee amputation) unilateral, left (HCC)  -     cephalexin (KEFLEX) 500 mg capsule; Take 1 capsule (500 mg total) by mouth every 12 (twelve) hours for 10 days    Other orders  -     insulin detemir (LEVEMIR) 100 units/mL subcutaneous injection; Inject under the skin daily at bedtime  -     Lactobacillus (PROBIOTIC ACIDOPHILUS PO); Take by mouth          Subjective: "I am here to have my incision site checked "     Patient ID: Madonna Officer is a [de-identified] y o  female  Patient is s/p left BKA by Dr Belkis Urrutia on 9/17/18  Pt's incision site on her left leg is clear and dry with some scabbed over areas  The skin surrounding her wound is red, but not hot   Pt was able to feel touch at the site and said that she felt that the hand that was touching her was cold  She states that she only feels discomfort at the site of the sutures  Her right foot is red and swollen  She has states that it is painful  She has a black spot about the size of a half dollar on her medial foot  There is also a black area in between her third and second toe  The wounds on her right foot are not draining and no odor is present  She is on Xarelto and Atorvastatin daily  Patient seen for initial postop visit s/p L BKA by Dr Diego Baker on 9/17/18 for tx of nonhealing wounds/gangrene with severe PAD  There is edema and blistering at amp site with necrosis along incision site  She has pain to touch  Denies any fever/chills  Reports feeling well overall  Continues with dry gangrene to the right foot with associated pain  HBO therapy on hold while at rehab  The following portions of the patient's history were reviewed and updated as appropriate: allergies, current medications, past family history, past medical history, past social history, past surgical history and problem list     Review of Systems   Constitutional: Negative for chills and fever  HENT: Negative  Eyes: Negative  Respiratory: Negative for chest tightness and shortness of breath  Cardiovascular: Positive for leg swelling  Negative for chest pain  Gastrointestinal: Negative  Endocrine: Negative  Genitourinary: Negative  Musculoskeletal: Positive for gait problem and myalgias  Skin: Positive for color change and wound  Allergic/Immunologic: Negative  Neurological: Negative for numbness  Hematological: Bruises/bleeds easily  Psychiatric/Behavioral: Negative  Objective:       Physical Exam   Constitutional: She is oriented to person, place, and time  She appears well-nourished  No distress  HENT:   Head: Normocephalic and atraumatic  Eyes: No scleral icterus  Neck: Neck supple  No JVD present  Cardiovascular: Normal rate  An irregular rhythm present     Pulses: Femoral pulses are 2+ on the left side  Musculoskeletal: She exhibits edema (nonpitting edema RLE, edema L BKA stump)  Seen in wheelchair   Neurological: She is alert and oriented to person, place, and time  Skin:   Dry gangrene to right foot at base of 1st metatarsal  L BKA stump with necrosis, erythema, slight warmth, blistering to flap, not boggy, dry with no drainage (see pic)   Psychiatric: She has a normal mood and affect               Vitals:    10/16/18 1636   BP: 96/60   BP Location: Right arm   Patient Position: Sitting   Pulse: 82   Resp: 18   Temp: (!) 97 3 °F (36 3 °C)   TempSrc: Tympanic   Height: 5' 4" (1 626 m)       Patient Active Problem List   Diagnosis    Essential hypertension    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 native heart without angina pectoris    Type 2 diabetes mellitus with complication, with long-term current use of insulin (HCC)    Atrial flutter (HCC)    Hyperlipidemia    Gastroesophageal reflux disease without esophagitis    Moderate mitral stenosis    Acute kidney injury ([address] )    Arthritis of hand    Asthma    Asymptomatic carotid artery stenosis, bilateral    Atherosclerosis of native artery of both lower extremities with bilateral ulceration (HCC)    Hx of CABG    Chronic anticoagulation    Chronic combined systolic and diastolic congestive heart failure (HCC)    Degenerative joint disease involving multiple joints    Diabetic retinopathy (HCC)    Postoperative hypothyroidism    Migraine headache    Nephrolithiasis    Osteoarthritis of both knees    Paroxysmal atrial fibrillation (HCC)    Ulcer of toe of left foot (HCC)    Ulcer of toe of right foot (HCC)    Prolonged Q-T interval on ECG    Hypokalemia    Hypocalcemia    Left bundle branch block (LBBB)    1st degree AV block    S/P TAVR (transcatheter aortic valve replacement)    Expressive aphasia    Multiple lacunar infarcts    Vomiting    History of CVA (cerebrovascular accident)    Seizure ([facility] [address]    NSVT (nonsustained ventricular tachycardia) (HCC)    Pulmonary hypertension (HCC)    Anemia    Lactic acidosis    Hypomagnesemia    Bradycardia    Foot pain    PAD (peripheral artery disease) (HCC)    Cellulitis    CKD (chronic kidney disease) stage 3, GFR 30-59 ml/min (HCC)    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)    Elevated INR    Hypothyroidism    Rotator cuff disorder, left    Wet gangrene (HCC)    Decubitus ulcer of sacral region, stage 3 (HCC)    Urinary retention    S/P BKA (below knee amputation) unilateral, left (HCC)       Past Surgical History:   Procedure Laterality Date    APPENDECTOMY      ARTERIOGRAM  12/19/2017    CARDIAC CATHETERIZATION      CHOLECYSTECTOMY      COLONOSCOPY      CORONARY ARTERY BYPASS GRAFT  2004    IR ABDOMINAL ANGIOGRAPHY / INTERVENTION  8/10/2018    IR ABDOMINAL ANGIOGRAPHY / INTERVENTION  8/21/2018    LEG AMPUTATION THROUGH LOWER TIBIA AND FIBULA Left 9/17/2018    Procedure: AMPUTATION BELOW KNEE (BKA); Surgeon: Alejandro Hamilton MD;  [facility];  Service: Vascular    LUMBAR LAMINECTOMY      LA ECHO TRANSESOPHAG R-T 2D W/PRB IMG ACQUISJ I&R N/A 4/3/2018    Procedure: TRANSESOPHAGEAL ECHOCARDIOGRAM (ROBB); Surgeon: Elliott Tanner DO;  [facility];  Service: Cardiac Surgery    LA REPLACE AORTIC VALVE OPENFEMORAL ARTERY APPROACH N/A 4/3/2018    Procedure: REPLACEMENT AORTIC VALVE TRANSCATHETER (TAVR) TRANSFEMORAL w/ 26MM IVY YEVGENIY S3 VALVE;  Surgeon: Elliott Tanner DO;  [facility];  Service: Cardiac Surgery    LA VEIN BYPASS GRAFT,TIB-PERONEAL Left 9/12/2018    Procedure: Exploration of posterior tibial, medial, and lateral plantar arteries  ;  Surgeon: Omar Alarcon MD;  [facility];  Service: Vascular    THYROIDECTOMY      TOTAL ABDOMINAL HYSTERECTOMY W/ BILATERAL SALPINGOOPHORECTOMY         Family History   Problem Relation Age of Onset    Cancer Mother     Stroke Mother     Cancer Father     Heart disease Father     Breast cancer Sister     Diabetes Daughter         Passed away January 2017        Social History     Social History    Marital status:      Spouse name: N/A    Number of children: N/A    Years of education: N/A     Occupational History    Not on file       Social History Main Topics    Smoking status: Never Smoker    Smokeless tobacco: Never Used    Alcohol use No    Drug use: No    Sexual activity: Not on file     Other Topics Concern    Not on file     Social History Narrative    No narrative on file       Allergies   Allergen Reactions    Other Chest Pain     IVP-listed as "chest pain" in previous chart, patient stated this occurred "a long time ago" but does not remember exactly occurred     Contrast [Iodinated Diagnostic Agents] Other (See Comments)     Flash pulm edema    Doxycycline Chest Pain    Ketorolac Other (See Comments)     Chest pain     Levaquin [Levofloxacin] Chest Pain    Ondansetron Chest Pain     Prolonged QT    Toradol [Ketorolac Tromethamine] Chest Pain         Current Outpatient Prescriptions:     amiodarone 200 mg tablet, Take 1 tablet (200 mg total) by mouth 2 (two) times a day, Disp: 60 tablet, Rfl: 3    atorvastatin (LIPITOR) 80 mg tablet, Take 1 tablet (80 mg total) by mouth daily, Disp: 30 tablet, Rfl: 2    Cholecalciferol (VITAMIN D3) 1000 units CHEW, Chew 1 tablet (1,000 Units total) daily, Disp: , Rfl:     docusate sodium (COLACE) 100 mg capsule, Take 1 capsule (100 mg total) by mouth 2 (two) times a day, Disp: 60 capsule, Rfl: 0    fentaNYL (DURAGESIC) 25 mcg/hr, Place 1 patch on the skin every third day for 10 doses Max Daily Amount: 1 patch, Disp: 3 patch, Rfl: 0    fluticasone (FLONASE) 50 mcg/act nasal spray, 1 spray into each nostril daily, Disp: , Rfl:     gabapentin (NEURONTIN) 100 mg capsule, Take 2 capsules (200 mg total) by mouth 2 (two) times a day, Disp: , Rfl:     insulin detemir (LEVEMIR) 100 units/mL subcutaneous injection, Inject under the skin daily at bedtime, Disp: , Rfl:     Insulin Syringe-Needle U-100 (BD INSULIN SYRINGE ULTRAFINE) 31G X 5/16" 1 ML MISC, Inject under the skin 2 (two) times a day, Disp: 200 each, Rfl: 3    Lactobacillus (PROBIOTIC ACIDOPHILUS PO), Take by mouth, Disp: , Rfl:     levETIRAcetam (KEPPRA) 750 mg tablet, Take 1 tablet (750 mg total) by mouth daily, Disp: , Rfl:     levothyroxine 100 mcg tablet, Take 1 tablet (100 mcg total) by mouth daily, Disp: 90 tablet, Rfl: 1    lisinopril (ZESTRIL) 10 mg tablet, Take 1 tablet (10 mg total) by mouth daily, Disp: 30 tablet, Rfl: 0    loratadine (CLARITIN) 10 mg tablet, Take 1 tablet (10 mg total) by mouth daily, Disp: 90 tablet, Rfl: 1    magnesium oxide (MAG-OX) 400 mg, Take 1 tablet (400 mg total) by mouth 2 (two) times a day, Disp: 180 tablet, Rfl: 1    omeprazole (PriLOSEC) 20 mg delayed release capsule, Take 20 mg by mouth daily, Disp: , Rfl:     oxyCODONE (ROXICODONE) 5 mg immediate release tablet, 1 tablet (5mg) oral every 3 hours as needed for moderate pain or 2 tablets (10mg) every 3 hours as needed for severe pain, Disp: 24 tablet, Rfl: 0    polyethylene glycol (MIRALAX) 17 g packet, Take 17 g by mouth daily as needed  , Disp: , Rfl:     rivaroxaban (XARELTO) 20 mg tablet, Take 1 tablet (20 mg total) by mouth daily, Disp: 90 tablet, Rfl: 0    senna-docusate sodium (SENOKOT S) 8 6-50 mg per tablet, Take 1 tablet by mouth daily at bedtime, Disp: , Rfl:     acetaminophen (TYLENOL) 650 mg CR tablet, Take 1 tablet (650 mg total) by mouth every 6 (six) hours as needed for mild pain Do not take in conjunction with Percocet   (Patient not taking: Reported on 10/16/2018 ), Disp: , Rfl:     albuterol (VENTOLIN HFA) 90 mcg/act inhaler, Inhale 108 mcg 2 (two) times a day as needed 2 puffs bid PRN, Disp: , Rfl:     amLODIPine (NORVASC) 10 mg tablet, Take 1 tablet (10 mg total) by mouth daily (Patient not taking: Reported on 10/16/2018 ), Disp: 30 tablet, Rfl: 0    bisacodyl (DULCOLAX) 10 mg suppository, Insert 1 suppository (10 mg total) into the rectum daily as needed for constipation (Patient not taking: Reported on 10/16/2018 ), Disp: , Rfl:     bisacodyl (DULCOLAX) 5 mg EC tablet, Take 2 tablets (10 mg total) by mouth daily (Patient not taking: Reported on 10/16/2018 ), Disp: , Rfl:     cephalexin (KEFLEX) 500 mg capsule, Take 1 capsule (500 mg total) by mouth every 12 (twelve) hours for 10 days, Disp: 20 capsule, Rfl: 0    diclofenac sodium (VOLTAREN) 1 %, Apply 2 g topically 4 (four) times a day (Patient not taking: Reported on 10/16/2018 ), Disp: , Rfl:     ferrous sulfate 325 (65 Fe) mg tablet, Take 1 tablet (325 mg total) by mouth every other day (Patient not taking: Reported on 10/16/2018 ), Disp: 30 tablet, Rfl: 0    furosemide (LASIX) 40 mg tablet, Take 1 tablet (40 mg total) by mouth daily (Patient not taking: Reported on 10/16/2018 ), Disp: 90 tablet, Rfl: 3    insulin glargine (LANTUS) 100 units/mL subcutaneous injection, Inject 12 Units under the skin every morning, Disp: , Rfl:     insulin glargine (LANTUS) 100 units/mL subcutaneous injection, Inject 8 Units under the skin daily at bedtime, Disp: , Rfl:     metFORMIN (GLUCOPHAGE) 1000 MG tablet, 1,000 mg 2 (two) times a day, Disp: , Rfl:     methocarbamol (ROBAXIN) 500 mg tablet, Take 0 5 tablets (250 mg total) by mouth every 6 (six) hours as needed for muscle spasms (Patient not taking: Reported on 10/16/2018 ), Disp: 12 tablet, Rfl: 0    metoclopramide (REGLAN) 10 mg tablet, Take 0 5 tablets (5 mg total) by mouth every 6 (six) hours as needed (Nausea and vomiting) (Patient not taking: Reported on 10/16/2018 ), Disp: 30 tablet, Rfl: 1    Mometasone Furoate (ASMANEX HFA) 100 MCG/ACT AERO, Inhale 2 puffs once daily (Patient not taking: Reported on 10/16/2018 ), Disp: 1 Inhaler, Rfl: 5    potassium chloride (K-DUR,KLOR-CON) 10 mEq tablet, Take 1 tablet (10 mEq total) by mouth 2 (two) times a day, Disp: 60 tablet, Rfl: 2

## 2018-10-16 NOTE — PATIENT INSTRUCTIONS
Severe peripheral arterial disease with bilateral lower extremity gangrene, status post left below-knee amputation 9/17/2018 by Dr Veronica Lowery  -amputation site with warmth and erythema  Will treat with a course of Keflex  -continue local wound care to RLE and to dark/necrotic area of amp site with Betadine paint daily  -followup in 10 days for recheck of L BKA    If signs of worsening infection, fever/chills, malodorous drainage notify office immediately  -will need close monitoring of right foot wounds

## 2018-10-16 NOTE — ASSESSMENT & PLAN NOTE
-Right foot dry gangrene  -s/p RLE angiogram with successful recanalization of peroneal artery, chronically occluded R AT/PT with reconstitution of DP and PT via collaterals  -Remains high risk for limb loss  -Continue local wound care with Betadine paint  -Was completing hyperbaric oxygen therapy, currently on hold due to rehab placement

## 2018-10-16 NOTE — ASSESSMENT & PLAN NOTE
-L BKA with necrosis along incision/flap site, non-palpable popliteal pulse, easily palpable R femoral pulse  Will need to monitor closely  -Erythema and warm  Will treat with course of Keflex  -Followup in 10 days for recheck of stump site    Notify office with any signs worsening infection/fever/chills

## 2018-10-24 ENCOUNTER — APPOINTMENT (OUTPATIENT)
Dept: WOUND CARE | Facility: HOSPITAL | Age: 78
End: 2018-10-24
Payer: MEDICARE

## 2018-10-24 PROCEDURE — 99214 OFFICE O/P EST MOD 30 MIN: CPT

## 2018-10-29 ENCOUNTER — OFFICE VISIT (OUTPATIENT)
Dept: VASCULAR SURGERY | Facility: CLINIC | Age: 78
End: 2018-10-29

## 2018-10-29 VITALS — DIASTOLIC BLOOD PRESSURE: 68 MMHG | TEMPERATURE: 98.2 F | SYSTOLIC BLOOD PRESSURE: 110 MMHG

## 2018-10-29 DIAGNOSIS — Z89.512 S/P BKA (BELOW KNEE AMPUTATION) UNILATERAL, LEFT (HCC): Primary | ICD-10-CM

## 2018-10-29 DIAGNOSIS — I70.261 ATHEROSCLEROSIS OF NATIVE ARTERY OF RIGHT LOWER EXTREMITY WITH GANGRENE (HCC): ICD-10-CM

## 2018-10-29 PROCEDURE — 99024 POSTOP FOLLOW-UP VISIT: CPT | Performed by: NURSE PRACTITIONER

## 2018-10-29 NOTE — ASSESSMENT & PLAN NOTE
-Dry gangrene to right foot  -s/p RLE angiogram with successful recanalization of peroneal, AT/PT chronically occluded with reconstitution via collaterals  -remains at high risk for limb loss   -continue local wound care with Betadine paint daily  -was completing HBO therapy, currently on hold due to rehab placement

## 2018-10-29 NOTE — ASSESSMENT & PLAN NOTE
-severe tibial disease with nonhealing left foot wounds and gangrene, s/p angiogram with plantar artery exploration, but no suitable target for bypass, now s/p L BKA 9/17/18 by Dr Main Pat is nonhealing with dry gangrene (see pic)   S/p fall from bed x2, patient reports landing on her stump  -stump is gangrenous, no gross drainage, purulence, bogginess or signs infection  -at present discussed the nonhealing nature of stump in setting of PAD and option of possible revision to AKA  -however patient is adamant that she will not pursue more proximal amputation  -due to stable, well demarcated nature of gangrene and history of recent injury will allow more time to heal  -continue local wound care with daily Betadine paint and dry dressing  -followup in 2 weeks with vascular surgeon Dr Lethea Shone  -if stump degenerates prior or she develops signs of infection the office should be notified immediately

## 2018-10-29 NOTE — PROGRESS NOTES
Assessment/Plan:    [de-identified] y/o F with HTN, HLD, CKD III, CAD s/p CABG, AS s/p TAVR, and severe PAD with bilateral foot gangrene s/p L BKA 9/17/18 by Dr Joey Carvajal, overall functional decline with stage 3 sacral decubitus, seen today for L BKA stump recheck  S/P BKA (below knee amputation) unilateral, left (HCC)  -severe tibial disease with nonhealing left foot wounds and gangrene, s/p angiogram with plantar artery exploration, but no suitable target for bypass, now s/p L BKA 9/17/18 by Dr Lachelle Avila is nonhealing with dry gangrene (see pic)  S/p fall from bed x2, patient reports landing on her stump  -stump is gangrenous, no gross drainage, purulence, bogginess or signs infection  -at present discussed the nonhealing nature of stump in setting of PAD and option of possible revision to AKA  -however patient is adamant that she will not pursue more proximal amputation  -due to stable, well demarcated nature of gangrene and history of recent injury will allow more time to heal  -continue local wound care with daily Betadine paint and dry dressing  -followup in 2 weeks with vascular surgeon Dr Joey Carvajal  -if stump degenerates prior or she develops signs of infection the office should be notified immediately    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 ([address] )  -Dry gangrene to right foot  -s/p RLE angiogram with successful recanalization of peroneal, AT/PT chronically occluded with reconstitution via collaterals  -remains at high risk for limb loss   -continue local wound care with Betadine paint daily  -was completing HBO therapy, currently on hold due to rehab placement             Diagnoses and all orders for this visit:    S/P BKA (below knee amputation) unilateral, left ([address] )    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 [facility])           Patient ID: Bertis Councilman is a [de-identified] y o  female  CHIEF COMPLAINT: Recheck 9/17 BKA by Dr Joey Carvajal   PT IS ON [facility] AND [address]  Pt states they are only changing dressing twice daily  Pt presents in office without dressing, Pt admits to two recent falls  Pt states she is having some pain to stump  Pt has some redness  Patient seen today for left BKA stump recheck  She has severe PAD status post bilateral lower extremity angiograms  Severe tibial disease of the left leg with no target for distal bypass, status post left BKA on 9/17/2018 by Dr Belkis Urrutia  Continues with dry stable gangrene to the right foot, status post right lower extremity angiogram with successful recannulization of the peroneal, AT/PT chronically occluded, but with reconstitution via collaterals  She was completing hyperbaric oxygen therapy, which is currently on hold due to rehab placement  She has experienced an overall functional decline with documentation of Stage 3 decubitus ulcer to the sacrum  She complains of buttocks pain while in office  She reports pain to the left stump site with palpation  No fever/chills  She reports that she has fallen from the bed twice in the middle of the night, landing on her stump  The following portions of the patient's history were reviewed and updated as appropriate: allergies, current medications, past family history, past medical history, past social history, past surgical history and problem list     Review of Systems   Constitutional: Negative  HENT: Negative  Eyes: Negative  Respiratory: Negative  Cardiovascular: Negative  Gastrointestinal: Negative  Endocrine: Negative  Genitourinary: Negative  Musculoskeletal: Positive for back pain  Skin: Negative  Allergic/Immunologic: Negative  Neurological: Negative  Hematological: Negative  Psychiatric/Behavioral: Negative  Objective:     Physical Exam   Constitutional: She is oriented to person, place, and time  No distress  HENT:   Head: Normocephalic and atraumatic  Eyes: No scleral icterus     Neck: Neck supple  Cardiovascular:   Pulses:       Femoral pulses are 2+ on the left side  Popliteal pulses are 0 on the left side  Pulmonary/Chest: Effort normal  No respiratory distress  Musculoskeletal:   Seen on stretcher    Neurological: She is alert and oriented to person, place, and time  Skin:   Dry gangrene to L BKA, no gross drainage, no erythema, no malodor, no purulence (photographed)  Dry well demarcated gangrene to the right foot  Psychiatric: She has a normal mood and affect               Vitals:    10/29/18 1544   BP: 110/68   BP Location: Right arm   Patient Position: Sitting   Cuff Size: Adult   Temp: 98 2 °F (36 8 °C)   TempSrc: Tympanic       Patient Active Problem List   Diagnosis    Essential hypertension    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 native heart without angina pectoris    Type 2 diabetes mellitus with complication, with long-term current use of insulin (HCC)    Atrial flutter (HCC)    Hyperlipidemia    Gastroesophageal reflux disease without esophagitis    Moderate mitral stenosis    Acute kidney injury ([address] )    Arthritis of hand    Asthma    Asymptomatic carotid artery stenosis, bilateral    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 ([address] )    Hx of CABG    Chronic anticoagulation    Chronic combined systolic and diastolic congestive heart failure (HCC)    Degenerative joint disease involving multiple joints    Diabetic retinopathy (HCC)    Postoperative hypothyroidism    Migraine headache    Nephrolithiasis    Osteoarthritis of both knees    Paroxysmal atrial fibrillation (HCC)    Ulcer of toe of left foot (HCC)    Ulcer of toe of right foot (HCC)    Prolonged Q-T interval on ECG    Hypokalemia    Hypocalcemia    Left bundle branch block (LBBB)    1st degree AV block    S/P TAVR (transcatheter aortic valve replacement)    Expressive aphasia    Multiple lacunar infarcts    Vomiting    History of CVA (cerebrovascular accident)    Seizure ([facility] )    NSVT (nonsustained ventricular tachycardia) (HCC)    Pulmonary hypertension (HCC)    Anemia    Lactic acidosis    Hypomagnesemia    Bradycardia    Foot pain    PAD (peripheral artery disease) (HCC)    Cellulitis    CKD (chronic kidney disease) stage 3, GFR 30-59 ml/min (HCC)    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)    Elevated INR    Hypothyroidism    Rotator cuff disorder, left    Wet gangrene (HCC)    Decubitus ulcer of sacral region, stage 3 (HCC)    Urinary retention    S/P BKA (below knee amputation) unilateral, left (HCC)       Past Surgical History:   Procedure Laterality Date    APPENDECTOMY      ARTERIOGRAM  12/19/2017    CARDIAC CATHETERIZATION      CHOLECYSTECTOMY      COLONOSCOPY      CORONARY ARTERY BYPASS GRAFT  2004    IR ABDOMINAL ANGIOGRAPHY / INTERVENTION  8/10/2018    IR ABDOMINAL ANGIOGRAPHY / INTERVENTION  8/21/2018    LEG AMPUTATION THROUGH LOWER TIBIA AND FIBULA Left 9/17/2018    Procedure: AMPUTATION BELOW KNEE (BKA); Surgeon: Christian Sylvester MD;  [facility];  Service: Vascular    LUMBAR LAMINECTOMY      WA ECHO TRANSESOPHAG R-T 2D W/PRB IMG ACQUISJ I&R N/A 4/3/2018    Procedure: TRANSESOPHAGEAL ECHOCARDIOGRAM (ROBB); Surgeon: Priyanka Gonzalez DO;  [facility];  Service: Cardiac Surgery    WA REPLACE AORTIC VALVE OPENFEMORAL ARTERY APPROACH N/A 4/3/2018    Procedure: REPLACEMENT AORTIC VALVE TRANSCATHETER (TAVR) TRANSFEMORAL w/ 26MM IVY YEVGENIY S3 VALVE;  Surgeon: Priyanka Gonzalez DO;  [facility];  Service: Cardiac Surgery    WA VEIN BYPASS GRAFT,TIB-PERONEAL Left 9/12/2018    Procedure: Exploration of posterior tibial, medial, and lateral plantar arteries  ;  Surgeon: Hilda Madrid MD;  [facility];  Service: Vascular    THYROIDECTOMY      TOTAL ABDOMINAL HYSTERECTOMY W/ BILATERAL SALPINGOOPHORECTOMY         Family History   Problem Relation Age of Onset    Cancer Mother     Stroke Mother     Cancer Father     Heart disease Father     Breast cancer Sister     Diabetes Daughter         Passed away January 2017        Social History     Social History    Marital status:      Spouse name: N/A    Number of children: N/A    Years of education: N/A     Occupational History    Not on file       Social History Main Topics    Smoking status: Never Smoker    Smokeless tobacco: Never Used    Alcohol use No    Drug use: No    Sexual activity: Not on file     Other Topics Concern    Not on file     Social History Narrative    No narrative on file       Allergies   Allergen Reactions    Other Chest Pain     IVP-listed as "chest pain" in previous chart, patient stated this occurred "a long time ago" but does not remember exactly occurred     Contrast [Iodinated Diagnostic Agents] Other (See Comments)     Flash pulm edema    Doxycycline Chest Pain    Ketorolac Other (See Comments)     Chest pain     Levaquin [Levofloxacin] Chest Pain    Ondansetron Chest Pain     Prolonged QT    Toradol [Ketorolac Tromethamine] Chest Pain         Current Outpatient Prescriptions:     acetaminophen (TYLENOL) 650 mg CR tablet, Take 1 tablet (650 mg total) by mouth every 6 (six) hours as needed for mild pain Do not take in conjunction with Percocet , Disp: , Rfl:     albuterol (VENTOLIN HFA) 90 mcg/act inhaler, Inhale 108 mcg 2 (two) times a day as needed 2 puffs bid PRN, Disp: , Rfl:     amiodarone 200 mg tablet, Take 1 tablet (200 mg total) by mouth 2 (two) times a day, Disp: 60 tablet, Rfl: 3    amLODIPine (NORVASC) 10 mg tablet, Take 1 tablet (10 mg total) by mouth daily, Disp: 30 tablet, Rfl: 0    atorvastatin (LIPITOR) 80 mg tablet, Take 1 tablet (80 mg total) by mouth daily, Disp: 30 tablet, Rfl: 2    bisacodyl (DULCOLAX) 10 mg suppository, Insert 1 suppository (10 mg total) into the rectum daily as needed for constipation, Disp: , Rfl:   bisacodyl (DULCOLAX) 5 mg EC tablet, Take 2 tablets (10 mg total) by mouth daily, Disp: , Rfl:     Cholecalciferol (VITAMIN D3) 1000 units CHEW, Chew 1 tablet (1,000 Units total) daily, Disp: , Rfl:     diclofenac sodium (VOLTAREN) 1 %, Apply 2 g topically 4 (four) times a day, Disp: , Rfl:     docusate sodium (COLACE) 100 mg capsule, Take 1 capsule (100 mg total) by mouth 2 (two) times a day, Disp: 60 capsule, Rfl: 0    ferrous sulfate 325 (65 Fe) mg tablet, Take 1 tablet (325 mg total) by mouth every other day, Disp: 30 tablet, Rfl: 0    fluticasone (FLONASE) 50 mcg/act nasal spray, 1 spray into each nostril daily, Disp: , Rfl:     furosemide (LASIX) 40 mg tablet, Take 1 tablet (40 mg total) by mouth daily, Disp: 90 tablet, Rfl: 3    gabapentin (NEURONTIN) 100 mg capsule, Take 2 capsules (200 mg total) by mouth 2 (two) times a day, Disp: , Rfl:     insulin detemir (LEVEMIR) 100 units/mL subcutaneous injection, Inject under the skin daily at bedtime, Disp: , Rfl:     insulin glargine (LANTUS) 100 units/mL subcutaneous injection, Inject 12 Units under the skin every morning, Disp: , Rfl:     insulin glargine (LANTUS) 100 units/mL subcutaneous injection, Inject 8 Units under the skin daily at bedtime, Disp: , Rfl:     Insulin Syringe-Needle U-100 (BD INSULIN SYRINGE ULTRAFINE) 31G X 5/16" 1 ML MISC, Inject under the skin 2 (two) times a day, Disp: 200 each, Rfl: 3    Lactobacillus (PROBIOTIC ACIDOPHILUS PO), Take by mouth, Disp: , Rfl:     levETIRAcetam (KEPPRA) 750 mg tablet, Take 1 tablet (750 mg total) by mouth daily, Disp: , Rfl:     levothyroxine 100 mcg tablet, Take 1 tablet (100 mcg total) by mouth daily, Disp: 90 tablet, Rfl: 1    lisinopril (ZESTRIL) 10 mg tablet, Take 1 tablet (10 mg total) by mouth daily, Disp: 30 tablet, Rfl: 0    loratadine (CLARITIN) 10 mg tablet, Take 1 tablet (10 mg total) by mouth daily, Disp: 90 tablet, Rfl: 1    magnesium oxide (MAG-OX) 400 mg, Take 1 tablet (400 mg total) by mouth 2 (two) times a day, Disp: 180 tablet, Rfl: 1    metFORMIN (GLUCOPHAGE) 1000 MG tablet, 1,000 mg 2 (two) times a day, Disp: , Rfl:     methocarbamol (ROBAXIN) 500 mg tablet, Take 0 5 tablets (250 mg total) by mouth every 6 (six) hours as needed for muscle spasms, Disp: 12 tablet, Rfl: 0    metoclopramide (REGLAN) 10 mg tablet, Take 0 5 tablets (5 mg total) by mouth every 6 (six) hours as needed (Nausea and vomiting), Disp: 30 tablet, Rfl: 1    Mometasone Furoate (ASMANEX HFA) 100 MCG/ACT AERO, Inhale 2 puffs once daily, Disp: 1 Inhaler, Rfl: 5    omeprazole (PriLOSEC) 20 mg delayed release capsule, Take 20 mg by mouth daily, Disp: , Rfl:     oxyCODONE (ROXICODONE) 5 mg immediate release tablet, 1 tablet (5mg) oral every 3 hours as needed for moderate pain or 2 tablets (10mg) every 3 hours as needed for severe pain, Disp: 24 tablet, Rfl: 0    polyethylene glycol (MIRALAX) 17 g packet, Take 17 g by mouth daily as needed  , Disp: , Rfl:     potassium chloride (K-DUR,KLOR-CON) 10 mEq tablet, Take 1 tablet (10 mEq total) by mouth 2 (two) times a day, Disp: 60 tablet, Rfl: 2    rivaroxaban (XARELTO) 20 mg tablet, Take 1 tablet (20 mg total) by mouth daily, Disp: 90 tablet, Rfl: 0    senna-docusate sodium (SENOKOT S) 8 6-50 mg per tablet, Take 1 tablet by mouth daily at bedtime, Disp: , Rfl:

## 2018-10-29 NOTE — PATIENT INSTRUCTIONS
Severe peripheral arterial disease s/p L BKA 9/17/18 and nonhealing wounds/gangrene to the right foot  -L BKA is nonhealing with dry gangrene  No malodor, purulence or malodorous drainage, not boggy, no signs gross infection  -Due to the nonhealing nature of this wound I discussed possible revision to L AKA  Patient is adamant that she will not pursue more proximal amputation  -At present continue local wound care with Betadine pain daily and a dry dressing  -Nonhealing gangrene to right foot    She has been optimized from a vascular standpoint on the right foot, but continues to be at high risk for limb loss on the right foot as well  -Followup in 10 day for wound recheck with Dr Rollene Crigler office immediately with any signs of wound infection or systemic infection

## 2018-10-29 NOTE — LETTER
October 29, 2018     [facility], [address] [facility]    Patient: Marily Casper   YOB: 1940   Date of Visit: 10/29/2018       Dear Dr Sylvester Cali:    Thank you for referring Debbie Davis to me for evaluation  Below are my notes for this consultation  If you have questions, please do not hesitate to call me  I look forward to following your patient along with you           Sincerely,        RAMO Boudreaux        CC: No Recipients

## 2018-11-08 ENCOUNTER — APPOINTMENT (OUTPATIENT)
Dept: WOUND CARE | Facility: HOSPITAL | Age: 78
End: 2018-11-08
Payer: COMMERCIAL

## 2018-11-08 PROCEDURE — 97597 DBRDMT OPN WND 1ST 20 CM/<: CPT

## 2018-11-08 PROCEDURE — 99213 OFFICE O/P EST LOW 20 MIN: CPT

## 2018-11-15 ENCOUNTER — OFFICE VISIT (OUTPATIENT)
Dept: VASCULAR SURGERY | Facility: CLINIC | Age: 78
End: 2018-11-15

## 2018-11-15 DIAGNOSIS — Z89.512 S/P BELOW KNEE AMPUTATION, LEFT (HCC): Primary | ICD-10-CM

## 2018-11-15 DIAGNOSIS — Z89.512 S/P BKA (BELOW KNEE AMPUTATION) UNILATERAL, LEFT (HCC): ICD-10-CM

## 2018-11-15 PROCEDURE — 99024 POSTOP FOLLOW-UP VISIT: CPT | Performed by: SURGERY

## 2018-11-15 RX ORDER — CHLORHEXIDINE GLUCONATE 0.12 MG/ML
15 RINSE ORAL EVERY 12 HOURS SCHEDULED
Status: CANCELLED | OUTPATIENT
Start: 2018-11-15

## 2018-11-15 NOTE — LETTER
November 15, 2018     Omar Ma MD  [address] [place] [address]    Patient: Natalie Carrillo   YOB: 1940   Date of Visit: 11/15/2018       Dear Dr Jerry Hancock:    Thank you for referring Al Montague to me for evaluation  Below are the relevant portions of my assessment and plan of care  S/P BKA (below knee amputation) unilateral, left ([address] )  Patient is s/p left BKA on 9/17/18  Unfortunately her posterior flap along the incision line has broken down with the development of a non-healing eschar  She has fallen on the stump multiple times both as inpatient s/p BKA and at rehab/nursing home and is likely contributory to the development of her wounds       Will proceed with Left bka revision with possible wound placement in the upcoming weeks           If you have questions, please do not hesitate to call me  I look forward to following Prema Daughters along with you           Sincerely,        Garth Baker MD        CC: No Recipients

## 2018-11-15 NOTE — PROGRESS NOTES
Assessment/Plan:    S/P BKA (below knee amputation) unilateral, left ([facility] )  Patient is s/p left BKA on 9/17/18  Unfortunately her posterior flap along the incision line has broken down with the development of a non-healing eschar  She has unfortunately fallen on the stump multiple times both as inpatient s/p BKA and at rehab/nursing home and is likely the etiology of the development of her wounds  Will proceed with Left bka revision with possible wound placement in the upcoming weeks  Problem List Items Addressed This Visit        Other    S/P BKA (below knee amputation) unilateral, left ([facility] )     Patient is s/p left BKA on 9/17/18  Unfortunately her posterior flap along the incision line has broken down with the development of a non-healing eschar  She has unfortunately fallen on the stump multiple times both as inpatient s/p BKA and at rehab/nursing home and is likely the etiology of the development of her wounds  Will proceed with Left bka revision with possible wound placement in the upcoming weeks  S/P below knee amputation, left ([facility]) - Primary    Relevant Orders    Type and screen    CBC and differential    Case request operating room: Revision of Left AMPUTATION BELOW KNEE (BKA) (Completed)            Subjective: " I am here to have my wound checked "     Patient ID: Tamy Baig is a [de-identified] y o  female  Patient is here s/p BKA left done on 9/17/2018 by Dr Mani Camacho  Pt states that she fell out of bed at the beginning of the week  The wounds at the incision site are black and her stump is red  Pt states that she has sharp pain when wound site is being touched  Pt denies fevers or chills  Pt denies any numbness  Pt has a black, dry wounds on her Rt  Foot and toes  Pt is taking Xarelto and Lipitor          The following portions of the patient's history were reviewed and updated as appropriate: allergies, current medications, past family history, past medical history, past social history, past surgical history and problem list     Review of Systems   Constitutional: Negative for chills and fever  HENT: Negative  Eyes: Negative  Respiratory: Negative for shortness of breath  Cardiovascular: Negative for chest pain and leg swelling  Gastrointestinal: Negative  Endocrine: Negative  Genitourinary: Negative  Musculoskeletal: Positive for gait problem  Negative for myalgias  Skin: Positive for color change and wound  Allergic/Immunologic: Negative  Neurological: Positive for numbness  Hematological: Negative  Psychiatric/Behavioral: Negative  Objective:       Physical Exam   Constitutional: She is oriented to person, place, and time  She appears well-developed  HENT:   Head: Normocephalic and atraumatic  Cardiovascular: Normal rate, regular rhythm and normal heart sounds  Exam reveals no gallop and no friction rub  No murmur heard  Pulmonary/Chest: Effort normal and breath sounds normal    Abdominal: Soft  Bowel sounds are normal  She exhibits no distension  There is no tenderness  Musculoskeletal: She exhibits no tenderness  Non-healing left BKA with incisional breakdown of the stump with non-healing  No signs of infection  Right foot with dry gangrene and tissue loss along the right great hallux and small wounds along the other toes  No signs of active infection   Neurological: She is alert and oriented to person, place, and time  Skin: Skin is warm and dry           Operative Scheduling Information:    Hospital:  [facility]    Physician:  Me    Surgery: Left BKA revision    Urgency:  Standard    Case Length:  Normal    Post-op Bed:  Floor without tele    OR Table:  Standard    Equipment Needs:  Wound Vac    Medication Instructions:  Hold Xarelto 48 hours prior to procedure    Hydration:  No

## 2018-11-16 ENCOUNTER — TELEPHONE (OUTPATIENT)
Dept: VASCULAR SURGERY | Facility: CLINIC | Age: 78
End: 2018-11-16

## 2018-11-16 NOTE — TELEPHONE ENCOUNTER
I spoke to Christi Eason at [facility] and confirmed date of surgery for 12/5/18 with Dr Dumont at [facility]  I am faxing instructions and Lab Rx to her  Pt will need blood work prior to surgery  She is to hold Xarelto for 2 day prior to surgery, last munoz on 12/2  Instructions reviewed and then faxed to Christi Eason

## 2018-11-21 ENCOUNTER — APPOINTMENT (OUTPATIENT)
Dept: WOUND CARE | Facility: HOSPITAL | Age: 78
End: 2018-11-21
Payer: MEDICARE

## 2018-11-21 PROCEDURE — 11042 DBRDMT SUBQ TIS 1ST 20SQCM/<: CPT

## 2018-11-27 ENCOUNTER — ANESTHESIA EVENT (OUTPATIENT)
Dept: PERIOP | Facility: HOSPITAL | Age: 78
End: 2018-11-27

## 2018-12-03 NOTE — PRE-PROCEDURE INSTRUCTIONS
Pre-Surgery Instructions:   Medication Instructions    acetaminophen (TYLENOL) 650 mg CR tablet Instructed patient per Anesthesia Guidelines   albuterol (VENTOLIN HFA) 90 mcg/act inhaler epic    amiodarone 200 mg tablet epic    amLODIPine (NORVASC) 10 mg tablet epic    atorvastatin (LIPITOR) 80 mg tablet epic    bisacodyl (DULCOLAX) 10 mg suppository epic    bisacodyl (DULCOLAX) 5 mg EC tablet epic    Cholecalciferol (VITAMIN D3) 1000 units CHEW Instructed patient per Anesthesia Guidelines   diclofenac sodium (VOLTAREN) 1 % epic stop three days before surgery    docusate sodium (COLACE) 100 mg capsule epic    ferrous sulfate 325 (65 Fe) mg tablet Instructed patient per Anesthesia Guidelines   fluticasone (FLONASE) 50 mcg/act nasal spray Instructed patient per Anesthesia Guidelines   furosemide (LASIX) 40 mg tablet epic Do not take 12/5    gabapentin (NEURONTIN) 100 mg capsule epic    insulin detemir (LEVEMIR) 100 units/mL subcutaneous injection epic Take day of surgery    insulin glargine (LANTUS) 100 units/mL subcutaneous injection epic take day of surgery    Lactobacillus (PROBIOTIC ACIDOPHILUS PO) Instructed patient per Anesthesia Guidelines   levETIRAcetam (KEPPRA) 750 mg tablet epic    levothyroxine 100 mcg tablet epic    lisinopril (ZESTRIL) 10 mg tablet epic do notes take 12/4, 12/5    loratadine (CLARITIN) 10 mg tablet anes guidlines    magnesium oxide (MAG-OX) 400 mg Instructed patient per Anesthesia Guidelines      metFORMIN (GLUCOPHAGE) 1000 MG tablet epic DO not take day of surgery    methocarbamol (ROBAXIN) 500 mg tablet epic    metoclopramide (REGLAN) 10 mg tablet epic    Mometasone Furoate (ASMANEX HFA) 100 MCG/ACT AERO epic    omeprazole (PriLOSEC) 20 mg delayed release capsule epic    oxyCODONE (ROXICODONE) 5 mg immediate release tablet epic    polyethylene glycol (MIRALAX) 17 g packet epic    potassium chloride (K-DUR,KLOR-CON) 10 mEq tablet Instructed patient per Anesthesia Guidelines   rivaroxaban (XARELTO) 20 mg tablet epic last dose 12/2 per office    senna-docusate sodium (SENOKOT S) 8 6-50 mg per tablet epic   Education Index     Med Instructions Troubleshoot   ACE/ARB Med Class     Do not take this medication the day before and the morning of the day of surgery/procedure  Diuretic Med Class     Continue this medication up to the evening before surgery/procedure, but do not take the morning of the day of surgery  Acetaminophen Med Class     Continue to take this medication on your normal schedule  If this is an oral medication and you take it in the morning, then you may take this medicine with a sip of water  Antiepileptic Med Class     Continue to take this medication on your normal schedule  If this is an oral medication and you take it in the morning, then you may take this medicine with a sip of water  Calcium Channel Blocker Med Class     Continue to take this heart medication on your normal schedule  If this is an oral medication and you take it in the morning, then you may take this medicine with a sip of water  Direct Xa Inhibitor Med Class     Stop taking this medication at least 3 days prior to surgery/procedure with prescribing Physician and Surgeon consultation  Inhalational Med Class     Continue to take these inhaler medications on your normal schedule up to and including the day of surgery  Insulin Med Class     Pre-Surgery/Procedure Instructions for Adult Patients who Take Medicine for Diabetes or to Control their Blood Sugar     Day Before Surgery/Procedure  Use the directions based on the type of medicine you take for your diabetes  1  If you are having a procedure that does not require a bowel prep:  ? Pre-Mixed Insulin (Intermediate Acting: Humalog 75/25, Humulin 70/30  Novolog 70/30, Regular Insulin)  § Take ½ your regular dose the evening before your procedure    ? Rapid/Fast Acting Insulin/Long Acting Insulin (Humalog U200, NovoLog, Apidra, Lantus, Levemir, Wilver Gallop, [place])  § Take your FULL regular dose the day before procedure  ? Oral Diabetic Medicines including Glipizide/Glimepiride/Glucotrol (sulfonylurea)  § Take your regular dose with dinner the evening before your procedure  2  If you are having a procedure (e g  Colonoscopy) that requires a bowel prep and you are allowed to have at least a clear liquid diet:  ? Pre-Mixed Insulin (Intermediate Acting: Humalog 75/25, Humulin 70/30, Novolog 70/30, Regular Insulin)  § Take ½ your regular dose the evening before your procedure  ? Rapid/Fast Acting Insulin (Humalog U200, NovoLog, Apidra, Fiasp)  § Take ½ your regular dose the evening before your procedure  ? Long Acting Insulin (Lantus, Levemir, Wilver Gallop)  § Take your FULL regular dose the day before procedure  ? Oral Glipizide/Glimepiride/Glucotrol (sulfonylurea)  § Take ½ your regular dose the evening before your procedure  ? Oral Diabetic Medicines that are NOT Glipizide/Glimepiride/Glucotrol  § Take your regular dose with dinner in the evening before your procedure      Day of Surgery/Procedure  · Long Acting Insulin (Lantus, Levemir, Wilver Gallop)  ? If you usually take your Long-Acting Insulin in the morning, take the full dose as scheduled  · With the exception of the morning Long-Acting Insulin noted above, DO NOT take ANY diabetic medicine on the day of your procedure unless you were instructed by the doctor who manages your diabetic medicines  · Continue to check your blood sugars  · If you have an insulin pump then consult with your Endocrinologist for instructions  · If you cannot see your Endocrinologist, on the day of the procedure set your insulin pump to your basal rate only   Please bring your insulin pump supplies to the hospital      This Educational material has been approved by the Patient Education Advisory Committee  Date prepared: 1/17/2018          Expiration date: 1/17/2019 Approval Number:                     Statin Med Class     Continue to take this medication on your normal schedule  If this is an oral medication and you take it in the morning, then you may take this medicine with a sip of water  Stool Softener Med Class     Continue to take this medication on your normal schedule  If this is an oral medication and you take it in the morning, then you may take this medicine with a sip of water  Thyroxine Med Class     Continue to take this medication on your normal schedule  If this is an oral medication and you take it in the morning, then you may take this medicine with a sip of water  Pre procedure instructions given verbally to [address] [place] [address] facility and copy faxed  Verbalizes understanding of instructions

## 2018-12-05 ENCOUNTER — ANESTHESIA EVENT (OUTPATIENT)
Dept: PERIOP | Facility: HOSPITAL | Age: 78
DRG: 463 | End: 2018-12-05
Payer: MEDICARE

## 2018-12-05 ENCOUNTER — HOSPITAL ENCOUNTER (INPATIENT)
Facility: HOSPITAL | Age: 78
LOS: 5 days | Discharge: NURSING, REHAB OR LONG-TERM CARE | DRG: 463 | End: 2018-12-10
Attending: SURGERY | Admitting: SURGERY
Payer: MEDICARE

## 2018-12-05 ENCOUNTER — ANESTHESIA (OUTPATIENT)
Dept: PERIOP | Facility: HOSPITAL | Age: 78
DRG: 463 | End: 2018-12-05
Payer: MEDICARE

## 2018-12-05 ENCOUNTER — ANESTHESIA (OUTPATIENT)
Dept: PERIOP | Facility: HOSPITAL | Age: 78
End: 2018-12-05

## 2018-12-05 DIAGNOSIS — E11.8 TYPE 2 DIABETES MELLITUS WITH COMPLICATION, WITH LONG-TERM CURRENT USE OF INSULIN (HCC): ICD-10-CM

## 2018-12-05 DIAGNOSIS — Z79.4 TYPE 2 DIABETES MELLITUS WITH COMPLICATION, WITH LONG-TERM CURRENT USE OF INSULIN (HCC): ICD-10-CM

## 2018-12-05 DIAGNOSIS — Z89.511 S/P BKA (BELOW KNEE AMPUTATION) UNILATERAL, RIGHT (HCC): ICD-10-CM

## 2018-12-05 DIAGNOSIS — N17.9 ACUTE KIDNEY INJURY (HCC): Primary | ICD-10-CM

## 2018-12-05 DIAGNOSIS — I50.22 CHRONIC SYSTOLIC HEART FAILURE (HCC): ICD-10-CM

## 2018-12-05 DIAGNOSIS — Z89.512 S/P BELOW KNEE AMPUTATION, LEFT (HCC): ICD-10-CM

## 2018-12-05 LAB
ABO GROUP BLD: NORMAL
BLD GP AB SCN SERPL QL: NEGATIVE
GLUCOSE SERPL-MCNC: 123 MG/DL (ref 65–140)
GLUCOSE SERPL-MCNC: 58 MG/DL (ref 65–140)
GLUCOSE SERPL-MCNC: 68 MG/DL (ref 65–140)
GLUCOSE SERPL-MCNC: 80 MG/DL (ref 65–140)
GLUCOSE SERPL-MCNC: 83 MG/DL (ref 65–140)
GLUCOSE SERPL-MCNC: 92 MG/DL (ref 65–140)
PLATELET # BLD AUTO: 234 THOUSANDS/UL (ref 149–390)
PMV BLD AUTO: 10 FL (ref 8.9–12.7)
RH BLD: POSITIVE
SPECIMEN EXPIRATION DATE: NORMAL

## 2018-12-05 PROCEDURE — 0JBP0ZZ EXCISION OF LEFT LOWER LEG SUBCUTANEOUS TISSUE AND FASCIA, OPEN APPROACH: ICD-10-PCS | Performed by: SURGERY

## 2018-12-05 PROCEDURE — 86901 BLOOD TYPING SEROLOGIC RH(D): CPT | Performed by: STUDENT IN AN ORGANIZED HEALTH CARE EDUCATION/TRAINING PROGRAM

## 2018-12-05 PROCEDURE — 86900 BLOOD TYPING SEROLOGIC ABO: CPT | Performed by: STUDENT IN AN ORGANIZED HEALTH CARE EDUCATION/TRAINING PROGRAM

## 2018-12-05 PROCEDURE — 86850 RBC ANTIBODY SCREEN: CPT | Performed by: STUDENT IN AN ORGANIZED HEALTH CARE EDUCATION/TRAINING PROGRAM

## 2018-12-05 PROCEDURE — 82948 REAGENT STRIP/BLOOD GLUCOSE: CPT

## 2018-12-05 PROCEDURE — 85049 AUTOMATED PLATELET COUNT: CPT | Performed by: SURGERY

## 2018-12-05 RX ORDER — LIDOCAINE HYDROCHLORIDE 10 MG/ML
INJECTION, SOLUTION INFILTRATION; PERINEURAL AS NEEDED
Status: DISCONTINUED | OUTPATIENT
Start: 2018-12-05 | End: 2018-12-05 | Stop reason: SURG

## 2018-12-05 RX ORDER — HYDROMORPHONE HCL/PF 1 MG/ML
0.2 SYRINGE (ML) INJECTION
Status: DISCONTINUED | OUTPATIENT
Start: 2018-12-05 | End: 2018-12-05 | Stop reason: HOSPADM

## 2018-12-05 RX ORDER — CHLORHEXIDINE GLUCONATE 0.12 MG/ML
15 RINSE ORAL ONCE
Status: DISCONTINUED | OUTPATIENT
Start: 2018-12-05 | End: 2018-12-05

## 2018-12-05 RX ORDER — DOCUSATE SODIUM 100 MG/1
100 CAPSULE, LIQUID FILLED ORAL 2 TIMES DAILY
Status: DISCONTINUED | OUTPATIENT
Start: 2018-12-05 | End: 2018-12-10 | Stop reason: HOSPADM

## 2018-12-05 RX ORDER — SODIUM CHLORIDE, SODIUM LACTATE, POTASSIUM CHLORIDE, CALCIUM CHLORIDE 600; 310; 30; 20 MG/100ML; MG/100ML; MG/100ML; MG/100ML
20 INJECTION, SOLUTION INTRAVENOUS CONTINUOUS
Status: DISCONTINUED | OUTPATIENT
Start: 2018-12-05 | End: 2018-12-05

## 2018-12-05 RX ORDER — GABAPENTIN 300 MG/1
600 CAPSULE ORAL 2 TIMES DAILY
Status: DISCONTINUED | OUTPATIENT
Start: 2018-12-05 | End: 2018-12-08

## 2018-12-05 RX ORDER — FENTANYL CITRATE 50 UG/ML
INJECTION, SOLUTION INTRAMUSCULAR; INTRAVENOUS AS NEEDED
Status: DISCONTINUED | OUTPATIENT
Start: 2018-12-05 | End: 2018-12-05 | Stop reason: SURG

## 2018-12-05 RX ORDER — FENTANYL 12 UG/H
1 PATCH TRANSDERMAL
Status: DISCONTINUED | OUTPATIENT
Start: 2018-12-05 | End: 2018-12-10 | Stop reason: HOSPADM

## 2018-12-05 RX ORDER — ATORVASTATIN CALCIUM 80 MG/1
80 TABLET, FILM COATED ORAL
Status: DISCONTINUED | OUTPATIENT
Start: 2018-12-06 | End: 2018-12-10 | Stop reason: HOSPADM

## 2018-12-05 RX ORDER — AMIODARONE HYDROCHLORIDE 200 MG/1
200 TABLET ORAL 2 TIMES DAILY
Status: DISCONTINUED | OUTPATIENT
Start: 2018-12-05 | End: 2018-12-10 | Stop reason: HOSPADM

## 2018-12-05 RX ORDER — OXYCODONE HYDROCHLORIDE 5 MG/1
5 TABLET ORAL EVERY 4 HOURS PRN
Status: DISCONTINUED | OUTPATIENT
Start: 2018-12-05 | End: 2018-12-10 | Stop reason: HOSPADM

## 2018-12-05 RX ORDER — PROPOFOL 10 MG/ML
INJECTION, EMULSION INTRAVENOUS AS NEEDED
Status: DISCONTINUED | OUTPATIENT
Start: 2018-12-05 | End: 2018-12-05 | Stop reason: SURG

## 2018-12-05 RX ORDER — MAGNESIUM HYDROXIDE 1200 MG/15ML
LIQUID ORAL AS NEEDED
Status: DISCONTINUED | OUTPATIENT
Start: 2018-12-05 | End: 2018-12-05 | Stop reason: HOSPADM

## 2018-12-05 RX ORDER — SODIUM CHLORIDE, SODIUM LACTATE, POTASSIUM CHLORIDE, CALCIUM CHLORIDE 600; 310; 30; 20 MG/100ML; MG/100ML; MG/100ML; MG/100ML
50 INJECTION, SOLUTION INTRAVENOUS CONTINUOUS
Status: DISCONTINUED | OUTPATIENT
Start: 2018-12-05 | End: 2018-12-05

## 2018-12-05 RX ORDER — MELATONIN
1000 DAILY
Status: DISCONTINUED | OUTPATIENT
Start: 2018-12-06 | End: 2018-12-10 | Stop reason: HOSPADM

## 2018-12-05 RX ORDER — AMLODIPINE BESYLATE 10 MG/1
10 TABLET ORAL DAILY
Status: DISCONTINUED | OUTPATIENT
Start: 2018-12-05 | End: 2018-12-06

## 2018-12-05 RX ORDER — ALBUTEROL SULFATE 90 UG/1
2 AEROSOL, METERED RESPIRATORY (INHALATION) 2 TIMES DAILY PRN
Status: DISCONTINUED | OUTPATIENT
Start: 2018-12-05 | End: 2018-12-10 | Stop reason: HOSPADM

## 2018-12-05 RX ORDER — EPHEDRINE SULFATE 50 MG/ML
INJECTION, SOLUTION INTRAVENOUS AS NEEDED
Status: DISCONTINUED | OUTPATIENT
Start: 2018-12-05 | End: 2018-12-05 | Stop reason: SURG

## 2018-12-05 RX ORDER — METHOCARBAMOL 500 MG/1
500 TABLET, FILM COATED ORAL EVERY 6 HOURS PRN
Status: DISCONTINUED | OUTPATIENT
Start: 2018-12-05 | End: 2018-12-10 | Stop reason: HOSPADM

## 2018-12-05 RX ORDER — FENTANYL CITRATE/PF 50 MCG/ML
25 SYRINGE (ML) INJECTION
Status: DISCONTINUED | OUTPATIENT
Start: 2018-12-05 | End: 2018-12-05 | Stop reason: HOSPADM

## 2018-12-05 RX ORDER — OXYCODONE HYDROCHLORIDE 10 MG/1
10 TABLET ORAL EVERY 4 HOURS PRN
Status: DISCONTINUED | OUTPATIENT
Start: 2018-12-05 | End: 2018-12-08

## 2018-12-05 RX ORDER — SUCCINYLCHOLINE CHLORIDE 20 MG/ML
INJECTION INTRAMUSCULAR; INTRAVENOUS AS NEEDED
Status: DISCONTINUED | OUTPATIENT
Start: 2018-12-05 | End: 2018-12-05 | Stop reason: SURG

## 2018-12-05 RX ORDER — CEFAZOLIN SODIUM 1 G/3ML
INJECTION, POWDER, FOR SOLUTION INTRAMUSCULAR; INTRAVENOUS AS NEEDED
Status: DISCONTINUED | OUTPATIENT
Start: 2018-12-05 | End: 2018-12-05 | Stop reason: SURG

## 2018-12-05 RX ORDER — SODIUM CHLORIDE, SODIUM LACTATE, POTASSIUM CHLORIDE, CALCIUM CHLORIDE 600; 310; 30; 20 MG/100ML; MG/100ML; MG/100ML; MG/100ML
75 INJECTION, SOLUTION INTRAVENOUS CONTINUOUS
Status: DISCONTINUED | OUTPATIENT
Start: 2018-12-05 | End: 2018-12-06

## 2018-12-05 RX ORDER — HEPARIN SODIUM 5000 [USP'U]/ML
5000 INJECTION, SOLUTION INTRAVENOUS; SUBCUTANEOUS EVERY 8 HOURS SCHEDULED
Status: DISCONTINUED | OUTPATIENT
Start: 2018-12-05 | End: 2018-12-05

## 2018-12-05 RX ORDER — FLUTICASONE PROPIONATE 44 UG/1
1 AEROSOL, METERED RESPIRATORY (INHALATION)
Status: DISCONTINUED | OUTPATIENT
Start: 2018-12-05 | End: 2018-12-10 | Stop reason: HOSPADM

## 2018-12-05 RX ORDER — ACETAMINOPHEN 325 MG/1
650 TABLET ORAL EVERY 6 HOURS PRN
Status: DISCONTINUED | OUTPATIENT
Start: 2018-12-05 | End: 2018-12-10 | Stop reason: HOSPADM

## 2018-12-05 RX ORDER — PANTOPRAZOLE SODIUM 40 MG/1
40 TABLET, DELAYED RELEASE ORAL DAILY
COMMUNITY

## 2018-12-05 RX ORDER — FENTANYL 12 UG/H
1 PATCH TRANSDERMAL
Status: ON HOLD | COMMUNITY
End: 2018-12-10

## 2018-12-05 RX ORDER — ONDANSETRON 2 MG/ML
4 INJECTION INTRAMUSCULAR; INTRAVENOUS ONCE AS NEEDED
Status: DISCONTINUED | OUTPATIENT
Start: 2018-12-05 | End: 2018-12-05 | Stop reason: HOSPADM

## 2018-12-05 RX ORDER — LEVETIRACETAM 750 MG/1
750 TABLET ORAL DAILY
Status: DISCONTINUED | OUTPATIENT
Start: 2018-12-05 | End: 2018-12-10 | Stop reason: HOSPADM

## 2018-12-05 RX ORDER — LEVOTHYROXINE SODIUM 0.05 MG/1
100 TABLET ORAL
Status: DISCONTINUED | OUTPATIENT
Start: 2018-12-06 | End: 2018-12-10 | Stop reason: HOSPADM

## 2018-12-05 RX ORDER — ONDANSETRON 2 MG/ML
INJECTION INTRAMUSCULAR; INTRAVENOUS AS NEEDED
Status: DISCONTINUED | OUTPATIENT
Start: 2018-12-05 | End: 2018-12-05 | Stop reason: SURG

## 2018-12-05 RX ORDER — PANTOPRAZOLE SODIUM 40 MG/1
40 TABLET, DELAYED RELEASE ORAL
Status: DISCONTINUED | OUTPATIENT
Start: 2018-12-06 | End: 2018-12-10 | Stop reason: HOSPADM

## 2018-12-05 RX ORDER — HYDROMORPHONE HCL/PF 1 MG/ML
0.5 SYRINGE (ML) INJECTION
Status: DISCONTINUED | OUTPATIENT
Start: 2018-12-05 | End: 2018-12-10

## 2018-12-05 RX ORDER — FLUTICASONE PROPIONATE 50 MCG
1 SPRAY, SUSPENSION (ML) NASAL DAILY
Status: DISCONTINUED | OUTPATIENT
Start: 2018-12-06 | End: 2018-12-10 | Stop reason: HOSPADM

## 2018-12-05 RX ADMIN — AMLODIPINE BESYLATE 10 MG: 10 TABLET ORAL at 16:12

## 2018-12-05 RX ADMIN — OXYCODONE HYDROCHLORIDE 10 MG: 10 TABLET ORAL at 20:47

## 2018-12-05 RX ADMIN — PROPOFOL 50 MG: 10 INJECTION, EMULSION INTRAVENOUS at 13:10

## 2018-12-05 RX ADMIN — BISACODYL 10 MG: 5 TABLET, COATED ORAL at 16:12

## 2018-12-05 RX ADMIN — FENTANYL 1 PATCH: 12.5 PATCH TRANSDERMAL at 16:12

## 2018-12-05 RX ADMIN — FENTANYL CITRATE 25 MCG: 50 INJECTION, SOLUTION INTRAMUSCULAR; INTRAVENOUS at 14:30

## 2018-12-05 RX ADMIN — ONDANSETRON 4 MG: 2 INJECTION INTRAMUSCULAR; INTRAVENOUS at 13:35

## 2018-12-05 RX ADMIN — LIDOCAINE HYDROCHLORIDE 50 MG: 10 INJECTION, SOLUTION INFILTRATION; PERINEURAL at 12:59

## 2018-12-05 RX ADMIN — EPHEDRINE SULFATE 10 MG: 50 INJECTION, SOLUTION INTRAMUSCULAR; INTRAVENOUS; SUBCUTANEOUS at 13:15

## 2018-12-05 RX ADMIN — SUCCINYLCHOLINE CHLORIDE 60 MG: 20 INJECTION, SOLUTION INTRAMUSCULAR; INTRAVENOUS at 12:59

## 2018-12-05 RX ADMIN — LEVETIRACETAM 750 MG: 750 TABLET ORAL at 16:12

## 2018-12-05 RX ADMIN — FENTANYL CITRATE 25 MCG: 50 INJECTION, SOLUTION INTRAMUSCULAR; INTRAVENOUS at 15:07

## 2018-12-05 RX ADMIN — CEFAZOLIN 1000 MG: 1 INJECTION, POWDER, FOR SOLUTION INTRAVENOUS at 12:52

## 2018-12-05 RX ADMIN — HEPARIN SODIUM 5000 UNITS: 5000 INJECTION, SOLUTION INTRAVENOUS; SUBCUTANEOUS at 16:13

## 2018-12-05 RX ADMIN — FENTANYL CITRATE 25 MCG: 50 INJECTION, SOLUTION INTRAMUSCULAR; INTRAVENOUS at 14:37

## 2018-12-05 RX ADMIN — FENTANYL CITRATE 100 MCG: 50 INJECTION, SOLUTION INTRAMUSCULAR; INTRAVENOUS at 12:59

## 2018-12-05 RX ADMIN — SODIUM CHLORIDE, SODIUM LACTATE, POTASSIUM CHLORIDE, AND CALCIUM CHLORIDE 75 ML/HR: .6; .31; .03; .02 INJECTION, SOLUTION INTRAVENOUS at 22:41

## 2018-12-05 RX ADMIN — RIVAROXABAN 20 MG: 20 TABLET, FILM COATED ORAL at 21:48

## 2018-12-05 RX ADMIN — PROPOFOL 100 MG: 10 INJECTION, EMULSION INTRAVENOUS at 12:59

## 2018-12-05 RX ADMIN — DOCUSATE SODIUM 100 MG: 100 CAPSULE, LIQUID FILLED ORAL at 17:57

## 2018-12-05 RX ADMIN — GABAPENTIN 600 MG: 300 CAPSULE ORAL at 17:57

## 2018-12-05 RX ADMIN — FLUTICASONE PROPIONATE 1 PUFF: 44 AEROSOL, METERED RESPIRATORY (INHALATION) at 20:48

## 2018-12-05 RX ADMIN — AMIODARONE HYDROCHLORIDE 200 MG: 200 TABLET ORAL at 17:57

## 2018-12-05 RX ADMIN — SODIUM CHLORIDE, SODIUM LACTATE, POTASSIUM CHLORIDE, AND CALCIUM CHLORIDE 20 ML/HR: .6; .31; .03; .02 INJECTION, SOLUTION INTRAVENOUS at 12:34

## 2018-12-05 NOTE — OP NOTE
OPERATIVE REPORT  PATIENT NAME: Leopoldo Buckles    :  1940  MRN: [id number]  Pt Location: [facility] OR ROOM 08    SURGERY DATE: 2018    Surgeon(s) and Role:     * Reese Moyer MD - Primary     * Emma Schmidt MD - Assisting    Preop Diagnosis:  S/P below knee amputation, left ([address] ) [Z89 512]    Post-Op Diagnosis Codes:     * S/P below knee amputation, left (HCC) [Z89 512]    Procedure(s) (LRB):  Revision of Left AMPUTATION BELOW KNEE (BKA) (Left)  APPLICATION VAC DRESSING    Specimen(s):  * No specimens in log *    Estimated Blood Loss:   Minimal    Drains:  Negative Pressure Wound Therapy (V A C ) Knee Left (Active)   Black foam- # applied 3 2018  1:34 PM   Number of days: 0       Urethral Catheter Latex 18 Fr  (Active)   Number of days: 76       Anesthesia Type:   General    Operative Indications:  S/P below knee amputation, left (HCC) [Z89 512]  With multiple traumatic injury to the stump site, presented with non-healing eschar along the incision  Operative Findings:  Dry gangrene debrided back to healthy clean bleeding tissue  Complications:   None    Procedure and Technique:  The patient was brought to the OR and placed on the table in supine position  General anesthesia was initiated  The left lower extremity was prepped and draped in the usual sterile fashion  Perioperative antibiotics were administered  Surgical time-out was performed  There Is of dry gangrene and eschar formation was bilobed type fashion  Using a scalpel all the dry eschar was sharply debrided back to healthy-appearing bleeding tissue  The skin edges were also debrided back to healthy areas as necessary  The debridement was limited to the deep subcutaneous tissues but no bone was exposed  Hemostasis was obtained with electrocautery  The wounds were pulse lavaged with antibiotic laden saline  Wound VAC was applied over the exposed surgical beds  Adequate suction and seal was obtained    Anesthesia was terminated and the patient was transferred back to the recovery area in stable condition    The wound measurements were 6 cm x 3 cm x 1 cm and 4 cm x 2 cm by 0 5 cm        I was present for the entire procedure    Patient Disposition:  PACU     SIGNATURE: Mary Ellen Aguayo MD  DATE: December 5, 2018  TIME: 1:48 PM

## 2018-12-05 NOTE — ANESTHESIA PREPROCEDURE EVALUATION
Review of Systems/Medical History  Patient summary reviewed  Chart reviewed  No history of anesthetic complications     Cardiovascular  EKG reviewed, Exercise tolerance (METS): poor,  Hyperlipidemia, Hypertension , Valvular heart disease (Moderate) , mitral valve stenosis, Valve replacement (TAVR 4/3/18) aortic valve  replacement, Past MI , CAD , History of CABG, Dysrhythmias , atrial fibrillation and atrial flutter, CHF (EF 35%) ,   Comment: Bilateral internal carotid stenosis, Pulmonary hypertension Pulmonary  Not a smoker , Asthma (Rescue once per month on average) , well controlled/ stable , No recent URI ,        GI/Hepatic    GERD well controlled,   Comment: Confirmed NPO appropriate  IBS     Kidney stones,        Endo/Other  Diabetes (Hgb A1c 7 7) poorly controlled type 2 Insulin, History of thyroid disease (multinodular goiter) , hypothyroidism,      GYN  Negative gynecology ROS          Hematology    Coagulation disorder (Heparin infusion) ,    Musculoskeletal  Back pain , Osteoarthritis,   Arthritis     Neurology  Seizures ,  TIA, CVA (expressive aphasia with MRI 4/17 showing multiple embolic appearing infarcts s/p TAVR 4/3, denies residual deficit) , Headaches,    Psychology   Negative psychology ROS              Physical Exam    Airway    Mallampati score: II  TM Distance: >3 FB  Neck ROM: full     Dental   upper dentures and lower dentures,     Cardiovascular  Rhythm: regular,     Pulmonary      Other Findings    5/1/18 TTE:  LEFT VENTRICLE:  The ventricle was mildly dilated  Systolic function was moderately to markedly reduced  Ejection fraction was estimated to be 35 %  There was moderate diffuse hypokinesis with distinct regional wall motion abnormalities  More severe hypokinesis to akinesis was seen in the inferoseptal and inferior walls  Wall thickness was mildly increased      VENTRICULAR SEPTUM:  There was moderate dyssynergic motion   These changes are consistent with LBBB      RIGHT VENTRICLE:  The ventricle was moderately dilated  Systolic function was mildly reduced      LEFT ATRIUM:  The atrium was moderately dilated      RIGHT ATRIUM:  The atrium was moderately dilated      MITRAL VALVE:  There was marked annular calcification  There was moderate diffuse thickening  There was moderately reduced leaflet separation  Transmitral velocity was increased due to valvular stenosis  There was moderate stenosis  There was mild regurgitation      AORTIC VALVE:  A bioprosthesis was present  It exhibited normal function  There was no significant perivalvular regurgitation  Valve mean gradient was 8 mmHg      TRICUSPID VALVE:  There was moderate regurgitation  Pulmonary artery systolic pressure was markedly increased  Estimated peak PA pressure was 70 mmHg      IVC, HEPATIC VEINS:  The inferior vena cava was dilated  Anesthesia Plan  ASA Score- 4     Anesthesia Type- general with ASA Monitors  Additional Monitors:   Airway Plan: ETT  Plan Factors-    Induction- intravenous  Postoperative Plan- Plan for postoperative opioid use  Planned trial extubation    Informed Consent- Anesthetic plan and risks discussed with patient

## 2018-12-05 NOTE — PROGRESS NOTES
Post op check - Vascular Surgery  Martine Kocher [de-identified] y o  female MRN: [id number]  Unit/Bed#: [facility] 506-01 Encounter: [id number]    Assessment:  [de-identified] y o  female now Day of Surgery s/p Procedure(s) (LRB):  Revision of Left AMPUTATION BELOW KNEE (BKA) (Left)  APPLICATION VAC DRESSING    Plan:  - Diet Regular; Regular House  - Pain and Nausea control PRN  - Incentive spirometry  - OOBTC  - VAC to -125mmHg sxn    Subjective/Objective     Subjective: Went to evaluate the patient after arrival to the floor  The patients pain is well controlled and the patient denies any other complaints  Objective:   Vitals: Blood pressure 122/67, pulse 68, temperature 98 3 °F (36 8 °C), temperature source Oral, resp  rate 14, height 5' 4" (1 626 m), weight 63 5 kg (140 lb), SpO2 95 %, not currently breastfeeding  ,Body mass index is 24 03 kg/m²  I/O       12/03 0701 - 12/04 0700 12/04 0701 - 12/05 0700 12/05 0701 - 12/06 0700    I V  (mL/kg)   708 2 (11 2)    Total Intake(mL/kg)   708 2 (11 2)    Net     +708 2                 Physical Exam:  Gen: NAD  Chest: Normal work of breathing  Abd: Soft, ND, NT  Ext: LLE BKA with VAC in place to sxn, minimal sanguinous output        Lab, Imaging and other studies:   CBC with diff:   Lab Results   Component Value Date     12/05/2018    MPV 10 0 12/05/2018   , BMP/CMP: No results found for: SODIUM, K, CL, CO2, ANIONGAP, BUN, CREATININE, GLUCOSE, CALCIUM, AST, ALT, ALKPHOS, PROT, BILITOT, EGFR, Magnesium: No components found for: MAG, Coags: No results found for: PT, PTT, INR, Blood Culture: No results found for: BLOODCX, Urine Culture: No results found for: URINECX, Wound Culure: No results found for: WOUNDCULT  VTE Pharmacologic Prophylaxis: Reason for no pharmacologic prophylaxis On Xarelto  VTE Mechanical Prophylaxis: sequential compression device

## 2018-12-05 NOTE — H&P
[de-identified] y/o F with HTN, HLD, CKD III, CAD s/p CABG, AS s/p TAVR, and severe PAD with bilateral foot gangrene s/p L BKA 9/17/18 by Dr James Mercer, overall functional decline with stage 3 sacral decubitus, seen today for L BKA stump recheck  S/P BKA (below knee amputation) unilateral, left (HCC)  -severe tibial disease with nonhealing left foot wounds and gangrene, s/p angiogram with plantar artery exploration, but no suitable target for bypass, now s/p L BKA 9/17/18 by Dr Jeyson Powell is nonhealing with dry gangrene (see pic)  S/p fall from bed x2, patient reports landing on her stump  -stump is gangrenous, no gross drainage, purulence, bogginess or signs infection  -plan for OR today for BKA revision, wound vac placement     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 ([address] )  -Dry gangrene to right foot  -s/p RLE angiogram with successful recanalization of peroneal, AT/PT chronically occluded with reconstitution via collaterals  -remains at high risk for limb loss   -continue local wound care with Betadine paint daily  -was completing HBO therapy, currently on hold due to rehab placement   The following portions of the patient's history were reviewed and updated as appropriate: allergies, current medications, past family history, past medical history, past social history, past surgical history and problem list      Review of Systems   Constitutional: Negative  HENT: Negative  Eyes: Negative  Respiratory: Negative  Cardiovascular: Negative  Gastrointestinal: Negative  Endocrine: Negative  Genitourinary: Negative  Musculoskeletal: Positive for back pain  Skin: Negative  Allergic/Immunologic: Negative  Neurological: Negative  Hematological: Negative  Psychiatric/Behavioral: Negative            Objective:      Physical Exam   Constitutional: She is oriented to person, place, and time  No distress  HENT:   Head: Normocephalic and atraumatic     Eyes: No scleral icterus  Neck: Neck supple  Cardiovascular:   Pulses:       Femoral pulses are 2+ on the left side  Popliteal pulses are 0 on the left side  Pulmonary/Chest: Effort normal  No respiratory distress  Musculoskeletal:   Seen on stretcher    Neurological: She is alert and oriented to person, place, and time  Skin:   Dry gangrene to L BKA, no gross drainage, no erythema, no malodor, no purulence (photographed)  Dry well demarcated gangrene to the right foot       Psychiatric: She has a normal mood and affect                Diagnoses and all orders for this visit:     S/P BKA (below knee amputation) unilateral, left (HCC)     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 (HCC)         KEPPRA) 750 mg tablet, Take 1 tablet (750 mg total) by mouth daily, Disp: , Rfl:     levothyroxine 100 mcg tablet, Take 1 tablet (100 mcg total) by mouth daily, Disp: 90 tablet, Rfl: 1    lisinopril (ZESTRIL) 10 mg tablet, Take 1 tablet (10 mg total) by mouth daily, Disp: 30 tablet, Rfl: 0    loratadine (CLARITIN) 10 mg tablet, Take 1 tablet (10 mg total) by mouth daily, Disp: 90 tablet, Rfl: 1    magnesium oxide (MAG-OX) 400 mg, Take 1 tablet (400 mg total) by mouth 2 (two) times a day, Disp: 180 tablet, Rfl: 1    metFORMIN (GLUCOPHAGE) 1000 MG tablet, 1,000 mg 2 (two) times a day, Disp: , Rfl:     methocarbamol (ROBAXIN) 500 mg tablet, Take 0 5 tablets (250 mg total) by mouth every 6 (six) hours as needed for muscle spasms, Disp: 12 tablet, Rfl: 0    metoclopramide (REGLAN) 10 mg tablet, Take 0 5 tablets (5 mg total) by mouth every 6 (six) hours as needed (Nausea and vomiting), Disp: 30 tablet, Rfl: 1    Mometasone Furoate (ASMANEX HFA) 100 MCG/ACT AERO, Inhale 2 puffs once daily, Disp: 1 Inhaler, Rfl: 5    omeprazole (PriLOSEC) 20 mg delayed release capsule, Take 20 mg by mouth daily, Disp: , Rfl:     oxyCODONE (ROXICODONE) 5 mg immediate release tablet, 1 tablet (5mg) oral every 3 hours as needed for moderate pain or 2 tablets (10mg) every 3 hours as needed for severe pain, Disp: 24 tablet, Rfl: 0    polyethylene glycol (MIRALAX) 17 g packet, Take 17 g by mouth daily as needed  , Disp: , Rfl:     potassium chloride (K-DUR,KLOR-CON) 10 mEq tablet, Take 1 tablet (10 mEq total) by mouth 2 (two) times a day, Disp: 60 tablet, Rfl: 2    rivaroxaban (XARELTO) 20 mg tablet, Take 1 tablet (20 mg total) by mouth daily, Disp: 90 tablet, Rfl: 0    senna-docusate sodium (SENOKOT S) 8 6-50 mg per tablet, Take 1 tablet by mouth daily at bedtime, Disp: , Rfl:

## 2018-12-06 PROBLEM — I10 ESSENTIAL HYPERTENSION, BENIGN: Status: ACTIVE | Noted: 2018-12-06

## 2018-12-06 LAB
ALBUMIN SERPL BCP-MCNC: 2.5 G/DL (ref 3.5–5)
ALP SERPL-CCNC: 59 U/L (ref 46–116)
ALT SERPL W P-5'-P-CCNC: 53 U/L (ref 12–78)
ANION GAP SERPL CALCULATED.3IONS-SCNC: 8 MMOL/L (ref 4–13)
AST SERPL W P-5'-P-CCNC: 57 U/L (ref 5–45)
BASOPHILS # BLD AUTO: 0.05 THOUSANDS/ΜL (ref 0–0.1)
BASOPHILS NFR BLD AUTO: 1 % (ref 0–1)
BILIRUB DIRECT SERPL-MCNC: 0.17 MG/DL (ref 0–0.2)
BILIRUB SERPL-MCNC: 0.38 MG/DL (ref 0.2–1)
BUN SERPL-MCNC: 17 MG/DL (ref 5–25)
CALCIUM SERPL-MCNC: 7.9 MG/DL (ref 8.3–10.1)
CHLORIDE SERPL-SCNC: 101 MMOL/L (ref 100–108)
CO2 SERPL-SCNC: 28 MMOL/L (ref 21–32)
CREAT SERPL-MCNC: 1.16 MG/DL (ref 0.6–1.3)
EOSINOPHIL # BLD AUTO: 0.21 THOUSAND/ΜL (ref 0–0.61)
EOSINOPHIL NFR BLD AUTO: 2 % (ref 0–6)
ERYTHROCYTE [DISTWIDTH] IN BLOOD BY AUTOMATED COUNT: 15.9 % (ref 11.6–15.1)
EST. AVERAGE GLUCOSE BLD GHB EST-MCNC: 143 MG/DL
GFR SERPL CREATININE-BSD FRML MDRD: 45 ML/MIN/1.73SQ M
GLUCOSE SERPL-MCNC: 100 MG/DL (ref 65–140)
GLUCOSE SERPL-MCNC: 103 MG/DL (ref 65–140)
GLUCOSE SERPL-MCNC: 112 MG/DL (ref 65–140)
GLUCOSE SERPL-MCNC: 157 MG/DL (ref 65–140)
GLUCOSE SERPL-MCNC: 176 MG/DL (ref 65–140)
GLUCOSE SERPL-MCNC: 51 MG/DL (ref 65–140)
GLUCOSE SERPL-MCNC: 90 MG/DL (ref 65–140)
HBA1C MFR BLD: 6.6 % (ref 4.2–6.3)
HCT VFR BLD AUTO: 33.7 % (ref 34.8–46.1)
HGB BLD-MCNC: 9.9 G/DL (ref 11.5–15.4)
IMM GRANULOCYTES # BLD AUTO: 0.04 THOUSAND/UL (ref 0–0.2)
IMM GRANULOCYTES NFR BLD AUTO: 0 % (ref 0–2)
LYMPHOCYTES # BLD AUTO: 2.06 THOUSANDS/ΜL (ref 0.6–4.47)
LYMPHOCYTES NFR BLD AUTO: 20 % (ref 14–44)
MCH RBC QN AUTO: 23.2 PG (ref 26.8–34.3)
MCHC RBC AUTO-ENTMCNC: 29.4 G/DL (ref 31.4–37.4)
MCV RBC AUTO: 79 FL (ref 82–98)
MONOCYTES # BLD AUTO: 0.88 THOUSAND/ΜL (ref 0.17–1.22)
MONOCYTES NFR BLD AUTO: 8 % (ref 4–12)
NEUTROPHILS # BLD AUTO: 7.19 THOUSANDS/ΜL (ref 1.85–7.62)
NEUTS SEG NFR BLD AUTO: 69 % (ref 43–75)
NRBC BLD AUTO-RTO: 0 /100 WBCS
PLATELET # BLD AUTO: 241 THOUSANDS/UL (ref 149–390)
PMV BLD AUTO: 10.2 FL (ref 8.9–12.7)
POTASSIUM SERPL-SCNC: 3.8 MMOL/L (ref 3.5–5.3)
PREALB SERPL-MCNC: 16.6 MG/DL (ref 18–40)
PROT SERPL-MCNC: 5.7 G/DL (ref 6.4–8.2)
RBC # BLD AUTO: 4.26 MILLION/UL (ref 3.81–5.12)
SODIUM SERPL-SCNC: 137 MMOL/L (ref 136–145)
WBC # BLD AUTO: 10.43 THOUSAND/UL (ref 4.31–10.16)

## 2018-12-06 PROCEDURE — 99222 1ST HOSP IP/OBS MODERATE 55: CPT | Performed by: INTERNAL MEDICINE

## 2018-12-06 PROCEDURE — 83036 HEMOGLOBIN GLYCOSYLATED A1C: CPT | Performed by: PHYSICIAN ASSISTANT

## 2018-12-06 PROCEDURE — 80076 HEPATIC FUNCTION PANEL: CPT | Performed by: INTERNAL MEDICINE

## 2018-12-06 PROCEDURE — 99024 POSTOP FOLLOW-UP VISIT: CPT | Performed by: SURGERY

## 2018-12-06 PROCEDURE — 82948 REAGENT STRIP/BLOOD GLUCOSE: CPT

## 2018-12-06 PROCEDURE — 84134 ASSAY OF PREALBUMIN: CPT | Performed by: PHYSICIAN ASSISTANT

## 2018-12-06 PROCEDURE — 80048 BASIC METABOLIC PNL TOTAL CA: CPT | Performed by: SURGERY

## 2018-12-06 PROCEDURE — 85025 COMPLETE CBC W/AUTO DIFF WBC: CPT | Performed by: SURGERY

## 2018-12-06 RX ORDER — DEXTROSE MONOHYDRATE 25 G/50ML
INJECTION, SOLUTION INTRAVENOUS
Status: COMPLETED
Start: 2018-12-06 | End: 2018-12-06

## 2018-12-06 RX ORDER — AMLODIPINE BESYLATE 5 MG/1
5 TABLET ORAL EVERY 12 HOURS
Status: DISCONTINUED | OUTPATIENT
Start: 2018-12-06 | End: 2018-12-10 | Stop reason: HOSPADM

## 2018-12-06 RX ORDER — SODIUM CHLORIDE, SODIUM GLUCONATE, SODIUM ACETATE, POTASSIUM CHLORIDE, MAGNESIUM CHLORIDE, SODIUM PHOSPHATE, DIBASIC, AND POTASSIUM PHOSPHATE .53; .5; .37; .037; .03; .012; .00082 G/100ML; G/100ML; G/100ML; G/100ML; G/100ML; G/100ML; G/100ML
50 INJECTION, SOLUTION INTRAVENOUS CONTINUOUS
Status: DISCONTINUED | OUTPATIENT
Start: 2018-12-06 | End: 2018-12-08

## 2018-12-06 RX ADMIN — OXYCODONE HYDROCHLORIDE 10 MG: 10 TABLET ORAL at 12:18

## 2018-12-06 RX ADMIN — FLUTICASONE PROPIONATE 1 PUFF: 44 AEROSOL, METERED RESPIRATORY (INHALATION) at 21:56

## 2018-12-06 RX ADMIN — LEVETIRACETAM 750 MG: 750 TABLET ORAL at 09:51

## 2018-12-06 RX ADMIN — ACETAMINOPHEN 650 MG: 325 TABLET, FILM COATED ORAL at 18:06

## 2018-12-06 RX ADMIN — SODIUM CHLORIDE, SODIUM GLUCONATE, SODIUM ACETATE, POTASSIUM CHLORIDE, MAGNESIUM CHLORIDE, SODIUM PHOSPHATE, DIBASIC, AND POTASSIUM PHOSPHATE 50 ML/HR: .53; .5; .37; .037; .03; .012; .00082 INJECTION, SOLUTION INTRAVENOUS at 09:44

## 2018-12-06 RX ADMIN — FLUTICASONE PROPIONATE 1 SPRAY: 50 SPRAY, METERED NASAL at 09:51

## 2018-12-06 RX ADMIN — DOCUSATE SODIUM 100 MG: 100 CAPSULE, LIQUID FILLED ORAL at 17:14

## 2018-12-06 RX ADMIN — RIVAROXABAN 20 MG: 20 TABLET, FILM COATED ORAL at 22:26

## 2018-12-06 RX ADMIN — VITAMIN D, TAB 1000IU (100/BT) 1000 UNITS: 25 TAB at 09:51

## 2018-12-06 RX ADMIN — AMIODARONE HYDROCHLORIDE 200 MG: 200 TABLET ORAL at 09:51

## 2018-12-06 RX ADMIN — RIVAROXABAN 20 MG: 20 TABLET, FILM COATED ORAL at 09:51

## 2018-12-06 RX ADMIN — DOCUSATE SODIUM 100 MG: 100 CAPSULE, LIQUID FILLED ORAL at 09:50

## 2018-12-06 RX ADMIN — PANTOPRAZOLE SODIUM 40 MG: 40 TABLET, DELAYED RELEASE ORAL at 06:22

## 2018-12-06 RX ADMIN — LEVOTHYROXINE SODIUM 100 MCG: 100 TABLET ORAL at 06:22

## 2018-12-06 RX ADMIN — INSULIN DETEMIR 24 UNITS: 100 INJECTION, SOLUTION SUBCUTANEOUS at 10:08

## 2018-12-06 RX ADMIN — GABAPENTIN 600 MG: 300 CAPSULE ORAL at 09:50

## 2018-12-06 RX ADMIN — GABAPENTIN 600 MG: 300 CAPSULE ORAL at 17:15

## 2018-12-06 RX ADMIN — INSULIN LISPRO 1 UNITS: 100 INJECTION, SOLUTION INTRAVENOUS; SUBCUTANEOUS at 12:18

## 2018-12-06 RX ADMIN — BISACODYL 10 MG: 5 TABLET, COATED ORAL at 09:50

## 2018-12-06 RX ADMIN — FLUTICASONE PROPIONATE 1 PUFF: 44 AEROSOL, METERED RESPIRATORY (INHALATION) at 09:51

## 2018-12-06 RX ADMIN — AMLODIPINE BESYLATE 5 MG: 5 TABLET ORAL at 09:50

## 2018-12-06 RX ADMIN — ATORVASTATIN CALCIUM 80 MG: 80 TABLET, FILM COATED ORAL at 17:14

## 2018-12-06 RX ADMIN — AMIODARONE HYDROCHLORIDE 200 MG: 200 TABLET ORAL at 17:15

## 2018-12-06 RX ADMIN — DEXTROSE MONOHYDRATE 25 ML: 25 INJECTION, SOLUTION INTRAVENOUS at 21:30

## 2018-12-06 NOTE — QUICK NOTE
Patient's PCP Dr Enrrique Palmer  Discussed with nursing staff- consult to go to [facility]  RN will have them contacted

## 2018-12-06 NOTE — UTILIZATION REVIEW
Initial Clinical Review  SCHEDULED INPATIENT PROCEDURE 18 / IP [id number] NO AUTH REQ  MEDICARE  PRIMARY    Age/Sex: [de-identified] y o  female    OPERATIVE REPORT  PATIENT NAME: Madonna Officer    :  1940  MRN: [id number]  Pt Location: BE OR ROOM 08     SURGERY DATE: 2018   Preop Diagnosis:  S/P below knee amputation, left (HCC) [O38 224]     Post-Op Diagnosis Codes:     * S/P below knee amputation, left (HCC) [Z89 512]     Procedure(s) (LRB):  Revision of Left AMPUTATION BELOW KNEE (BKA) (Left)  APPLICATION VAC DRESSING     Specimen(s):  * No specimens in log *     Estimated Blood Loss:   Minimal      Anesthesia Type: General     Operative Indications:  S/P below knee amputation, left ([address] ) [Z89 512]  With multiple traumatic injury to the stump site, presented with non-healing eschar along the incision       Operative Findings:  Dry gangrene debrided back to healthy clean bleeding tissue       Complications: None        Admission Orders: Date/Time/Statement: 18 AT 1355     Orders Placed This Encounter   Procedures    Inpatient Admission     Standing Status:   Standing     Number of Occurrences:   1     Order Specific Question:   Admitting Physician     Answer:   Ross Galvez     Order Specific Question:   Level of Care     Answer:   Med Surg [16]     Order Specific Question:   Estimated length of stay     Answer:   More than 2 Midnights     Order Specific Question:   Certification     Answer:   I certify that inpatient services are medically necessary for this patient for a duration of greater than two midnights  See H&P and MD Progress Notes for additional information about the patient's course of treatment         Vital Signs: /66   Pulse 60   Temp 98 5 °F (36 9 °C) (Oral)   Resp 18   Ht 5' 4" (1 626 m)   Wt 63 5 kg (140 lb)   LMP  (LMP Unknown)   SpO2 91%   BMI 24 03 kg/m²     Diet:        Diet Orders            Start     Ordered    18 0017  Diet Regular; Regular House Diet effective now     Question Answer Comment   Diet Type Regular    Regular Regular House    RD to adjust diet per protocol?  Yes        12/05/18 1536          Continuous IV Infusions:  lactated ringers infusion 75mL/hr Last Rate: 75 mL/hr (12/06/18 0944)       Mobility: OOB PER PT EVAL       DVT Prophylaxis: XARELTO:  SCD      Scheduled Meds:  Current Facility-Administered Medications:  acetaminophen 650 mg Oral Q6H PRN Kelly Dorantes PA-C    albuterol 2 puff Inhalation BID PRN Lisa Saeed MD    amiodarone 200 mg Oral BID Lisa Saeed MD    amLODIPine 5 mg Oral Q12H Saintclair Merle, MD    atorvastatin 80 mg Oral Daily With 65 [facility] MD Cj    bisacodyl 10 mg Oral Daily Lisa Saeed MD    cholecalciferol 1,000 Units Oral Daily Klely Dorantes PA-C    docusate sodium 100 mg Oral BID Lisa Saeed MD    fentaNYL 1 patch Transdermal Q72H Lisa Saeed MD    fluticasone 1 spray Nasal Daily Kelly Dorantes PA-C    fluticasone 1 puff Inhalation BID Lisa Saeed MD    gabapentin 600 mg Oral BID Lisa Saeed MD    HYDROmorphone 0 5 mg Intravenous Q3H PRN Lisa Saeed MD    insulin detemir 24 Units Subcutaneous Early Morning Kelly Dorantes PA-C    insulin lispro 1-5 Units Subcutaneous TID AC Kelly Dorantes PA-C    insulin lispro 1-5 Units Subcutaneous HS Kelly Dorantes PA-C    levETIRAcetam 750 mg Oral Daily Lisa Saeed MD    levothyroxine 100 mcg Oral Early Morning Lisa Saeed MD    methocarbamol 500 mg Oral Q6H PRN Lisa Saeed MD    multi-electrolyte 50 mL/hr Intravenous Continuous Saintclair Merle, MD Last Rate: 50 mL/hr (12/06/18 0944)   naloxone 0 04 mg Intravenous PRN Lisa Saeed MD    oxyCODONE 10 mg Oral Q4H PRN Lisa Saeed MD    oxyCODONE 5 mg Oral Q4H PRN Lisa Saeed MD    pantoprazole 40 mg Oral Early Morning [facility] MAGDALENO Salazar    rivaroxaban 20 mg Oral Daily Jonathan Negrete PA-C        PRN Meds:     acetaminophen    albuterol    HYDROmorphone    methocarbamol    naloxone    oxyCODONE 10 mg q4hrs prn given x 2         Vascular Surgery  Progress Notes  POD # 1 Date of Service: 12/6/2018  8:47 AM          * S/P below knee amputation, left (HCC) POD # 1   Assessment & Plan     Left BKA site with delayed healing and gangrene     S/P Left BKA revision, washout and VAC placement 12/5/18     Plan:  --Continue local wound care  --Arrange outpatient wound VAC therapy  --Saline lock  --Pain control  --Discharge when VAC arrangements made          Subjective:  Pt doing well, no complaints     Vitals:  /66   Pulse 60   Temp 98 5 °F (36 9 °C) (Oral)   Resp 18   Ht 5' 4" (1 626 m)   Wt 63 5 kg (140 lb)   LMP  (LMP Unknown)   SpO2 91%   BMI 24 03 kg/m²      I/Os:  I/O last 3 completed shifts: In: 2046 9 [P O :840; I V :1206 9]  Out: 575 [Urine:575]  No intake/output data recorded      Lab Results and Cultures:         Lab Results   Component Value Date     WBC 10 43 (H) 12/06/2018     HGB 9 9 (L) 12/06/2018     HCT 33 7 (L) 12/06/2018     MCV 79 (L) 12/06/2018      12/06/2018            Lab Results   Component Value Date     GLUCOSE 104 09/12/2018     CALCIUM 7 9 (L) 12/06/2018      10/14/2015     K 3 8 12/06/2018     CO2 28 12/06/2018      12/06/2018     BUN 17 12/06/2018     CREATININE 1 16 12/06/2018            Lab Results   Component Value Date     INR 1 28 (H) 09/18/2018     INR 1 52 (H) 09/09/2018     INR 1 77 (H) 09/08/2018     PROTIME 16 1 (H) 09/18/2018     PROTIME 18 4 (H) 09/09/2018     PROTIME 20 7 (H) 09/08/2018         Blood Culture:         Lab Results   Component Value Date     BLOODCX No Growth After 5 Days  08/08/2018     BLOODCX No Growth After 5 Days   08/08/2018   ,   Urinalysis:         Lab Results   Component Value Date     COLORU yellow 06/12/2018     COLORU Yellow 05/18/2018     COLORU Yellow 12/21/2016     CLARITYU cloudy 06/12/2018     CLARITYU Clear 05/18/2018     CLARITYU Transparent 12/21/2016     SPECGRAV 1 010 05/18/2018     SPECGRAV 1 010 12/21/2016     PHUR 5 0 05/18/2018     PHUR 8 12/21/2016     LEUKOCYTESUR moderate 06/12/2018     LEUKOCYTESUR Small (A) 05/18/2018     LEUKOCYTESUR - 12/21/2016     NITRITE neg 06/12/2018     NITRITE Negative 05/18/2018     NITRITE - 12/21/2016     PROTEINUA - 12/21/2016     GLUCOSEU neg 06/12/2018     GLUCOSEU Negative 05/18/2018     GLUCOSEU Negative 05/28/2015     KETONESU neg 06/12/2018     KETONESU Negative 05/18/2018     KETONESU - 12/21/2016     BILIRUBINUR neg 06/12/2018     BILIRUBINUR Negative 05/18/2018     BILIRUBINUR Negative 05/28/2015     BLOODU small 06/12/2018     BLOODU Negative 05/18/2018     BLOODU - 12/21/2016   ,   Urine Culture:         Lab Results   Component Value Date     URINECX >100,000 cfu/ml Proteus mirabilis (A) 06/12/2018   ,   Wound Culure:         Lab Results   Component Value Date     WOUNDCULT 4+ Growth of Pseudomonas aeruginosa (A) 09/11/2018     WOUNDCULT 4+ Growth of Klebsiella oxytoca (A) 09/11/2018     WOUNDCULT 4+ Growth of Staphylococcus aureus (A) 09/11/2018         Medications:  Current Medications          Current Facility-Administered Medications   Medication Dose Route Frequency    acetaminophen (TYLENOL) tablet 650 mg  650 mg Oral Q6H PRN    albuterol (PROVENTIL HFA,VENTOLIN HFA) inhaler 2 puff  2 puff Inhalation BID PRN    amiodarone tablet 200 mg  200 mg Oral BID    amLODIPine (NORVASC) tablet 5 mg  5 mg Oral Q12H    atorvastatin (LIPITOR) tablet 80 mg  80 mg Oral Daily With Dinner    bisacodyl (DULCOLAX) EC tablet 10 mg  10 mg Oral Daily    cholecalciferol (VITAMIN D3) tablet 1,000 Units  1,000 Units Oral Daily    docusate sodium (COLACE) capsule 100 mg  100 mg Oral BID    fentaNYL (DURAGESIC) 12 mcg/hr TD 72 hr patch 1 patch  1 patch Transdermal Q72H    fluticasone (FLONASE) 50 mcg/act nasal spray 1 spray  1 spray Nasal Daily    fluticasone (FLOVENT HFA) 44 mcg/act inhaler 1 puff  1 puff Inhalation BID    gabapentin (NEURONTIN) capsule 600 mg  600 mg Oral BID    HYDROmorphone (DILAUDID) injection 0 5 mg  0 5 mg Intravenous Q3H PRN    insulin detemir (LEVEMIR) subcutaneous injection 24 Units  24 Units Subcutaneous Early Morning    insulin lispro (HumaLOG) 100 units/mL subcutaneous injection 1-5 Units  1-5 Units Subcutaneous TID AC    insulin lispro (HumaLOG) 100 units/mL subcutaneous injection 1-5 Units  1-5 Units Subcutaneous HS    levETIRAcetam (KEPPRA) tablet 750 mg  750 mg Oral Daily    levothyroxine tablet 100 mcg  100 mcg Oral Early Morning    methocarbamol (ROBAXIN) tablet 500 mg  500 mg Oral Q6H PRN    naloxone (NARCAN) 0 04 mg/mL syringe 0 04 mg  0 04 mg Intravenous PRN    oxyCODONE (ROXICODONE) IR tablet 10 mg  10 mg Oral Q4H PRN    oxyCODONE (ROXICODONE) IR tablet 5 mg  5 mg Oral Q4H PRN    pantoprazole (PROTONIX) EC tablet 40 mg  40 mg Oral Early Morning    rivaroxaban (XARELTO) tablet 20 mg  20 mg Oral Daily            Physical Exam:   General appearance: alert and oriented, in no acute distress  Lungs: clear to auscultation bilaterally  Heart: regular rate and rhythm, S1, S2 normal, no murmur, click, rub or gallop  Abdomen: soft, non-tender; bowel sounds normal; no masses,  no organomegaly  Extremities: Left BKA with VAC, Right foot with rubor and dry stable gangrene     Wound/Incision:  Left BKA VAC intact

## 2018-12-06 NOTE — PROGRESS NOTES
MD/ RN rounding completed with Nephrology Dr Toyin Ornelas  Pt just bladder scanned for 176ml  Pt bladder scanned at 0643 for 152ml and last time pt was straight cathed was ar 2241 for 575ml  Dr Toyin Ornelas made aware- Isolyte 50ml/hr to be started  Order will be placed  Will continue to monitor/assess

## 2018-12-06 NOTE — PROGRESS NOTES
Spoke with [facility] Resident Avinash regarding pt's decreased urine output  RN has been in contact with Nephrology and fluids were started earlier today  Most recent bladder scan was 286ml at 1705  Pt also developed LLQ pain  Resident will evaluate at bedside

## 2018-12-06 NOTE — PLAN OF CARE
Problem: SKIN/TISSUE INTEGRITY - ADULT  Goal: Skin integrity remains intact  INTERVENTIONS  - Identify patients at risk for skin breakdown  - Assess and monitor skin integrity  - Assess and monitor nutrition and hydration status  - Monitor labs (i e  albumin)  - Assess for incontinence   - Turn and reposition patient  - Assist with mobility/ambulation  - Relieve pressure over bony prominences  - Avoid friction and shearing  - Provide appropriate hygiene as needed including keeping skin clean and dry  - Evaluate need for skin moisturizer/barrier cream  - Collaborate with interdisciplinary team (i e  Nutrition, Rehabilitation, etc )   - Patient/family teaching  Outcome: Progressing    Goal: Incision(s), wounds(s) or drain site(s) healing without S/S of infection  INTERVENTIONS  - Assess and document risk factors for skin impairment   - Assess and document dressing, incision, wound bed, drain sites and surrounding tissue  - Initiate Nutrition services consult and/or wound management as needed  Outcome: Progressing    Goal: Oral mucous membranes remain intact  INTERVENTIONS  - Assess oral mucosa and hygiene practices  - Implement preventative oral hygiene regimen  - Implement oral medicated treatments as ordered  - Initiate Nutrition services referral as needed  Outcome: Progressing      Problem: MUSCULOSKELETAL - ADULT  Goal: Maintain or return mobility to safest level of function  INTERVENTIONS:  - Assess patient's ability to carry out ADLs; assess patient's baseline for ADL function and identify physical deficits which impact ability to perform ADLs (bathing, care of mouth/teeth, toileting, grooming, dressing, etc )  - Assess/evaluate cause of self-care deficits   - Assess range of motion  - Assess patient's mobility; develop plan if impaired  - Assess patient's need for assistive devices and provide as appropriate  - Encourage maximum independence but intervene and supervise when necessary  - Involve family in performance of ADLs  - Assess for home care needs following discharge   - Request OT consult to assist with ADL evaluation and planning for discharge  - Provide patient education as appropriate  Outcome: Progressing    Goal: Maintain proper alignment of affected body part  INTERVENTIONS:  - Support, maintain and protect limb and body alignment  - Provide pt/fam with appropriate education  Outcome: Progressing      Problem: PAIN - ADULT  Goal: Verbalizes/displays adequate comfort level or baseline comfort level  Interventions:  - Encourage patient to monitor pain and request assistance  - Assess pain using appropriate pain scale  - Administer analgesics based on type and severity of pain and evaluate response  - Implement non-pharmacological measures as appropriate and evaluate response  - Consider cultural and social influences on pain and pain management  - Notify physician/advanced practitioner if interventions unsuccessful or patient reports new pain  Outcome: Progressing      Problem: SAFETY ADULT  Goal: Maintain or return to baseline ADL function  INTERVENTIONS:  -  Assess patient's ability to carry out ADLs; assess patient's baseline for ADL function and identify physical deficits which impact ability to perform ADLs (bathing, care of mouth/teeth, toileting, grooming, dressing, etc )  - Assess/evaluate cause of self-care deficits   - Assess range of motion  - Assess patient's mobility; develop plan if impaired  - Assess patient's need for assistive devices and provide as appropriate  - Encourage maximum independence but intervene and supervise when necessary  ¯ Involve family in performance of ADLs  ¯ Assess for home care needs following discharge   ¯ Request OT consult to assist with ADL evaluation and planning for discharge  ¯ Provide patient education as appropriate  Outcome: Progressing    Goal: Maintain or return mobility status to optimal level  INTERVENTIONS:  - Assess patient's baseline mobility status (ambulation, transfers, stairs, etc )    - Identify cognitive and physical deficits and behaviors that affect mobility  - Identify mobility aids required to assist with transfers and/or ambulation (gait belt, sit-to-stand, lift, walker, cane, etc )  - [place] fall precautions as indicated by assessment  - Record patient progress and toleration of activity level on Mobility SBAR; progress patient to next Phase/Stage  - Instruct patient to call for assistance with activity based on assessment  - Request Rehabilitation consult to assist with strengthening/weightbearing, etc   Outcome: Progressing

## 2018-12-06 NOTE — PROGRESS NOTES
Spoke with Dr Hermilo Mrorissey regarding pt's decreased urine output  Pt bladder scanned for 255 ml  Will continue serial bladder scans

## 2018-12-06 NOTE — PLAN OF CARE
Problem: DISCHARGE PLANNING - CARE MANAGEMENT  Goal: Discharge to post-acute care or home with appropriate resources  INTERVENTIONS:  - Conduct assessment to determine patient/family and health care team treatment goals, and need for post-acute services based on payer coverage, community resources, and patient preferences, and barriers to discharge  - Address psychosocial, clinical, and financial barriers to discharge as identified in assessment in conjunction with the patient/family and health care team  - Arrange appropriate level of post-acute services according to patient's   needs and preference and payer coverage in collaboration with the physician and health care team  - Communicate with and update the patient/family, physician, and health care team regarding progress on the discharge plan  - Arrange appropriate transportation to post-acute venues  - [facility] to [facility]  Outcome: Progressing

## 2018-12-06 NOTE — CONSULTS
Consultation - Nephrology   Martine Kocher [de-identified] y o  female MRN: [id number]  Unit/Bed#: [facility] 506-01 Encounter: [id number]      ASSESSMENT / PLAN:     1  LEONARD - secondary to outpatient Ace inhibition, volume depletion, relative hypotension and urinary retention  · Baseline creatinine usually less than 1  Most recent outpatient creatinine was 0 78 on 09/23 and 0 61 on 10/30  There was no admission creatinine but assuming LEONARD present on admission  · Will continue intravenous fluids at a lower rate  · Hold Ace inhibition  · Continue urinary retention protocol  · Norvasc dose was split and hold parameter was changed to hold for systolic blood pressure less than 130 to avoid relative hypotension with subsequent decrease in renal blood flow  · AST is slightly elevated  Trend LFTs for now  Elevated LFTs are potent stimulus for LEONARD  If LFTs increased further, would hold statin  · May need to adjust Neurontin and Xarelto dose if creatinine worsens  · Avoid nephrotoxins  · Recheck BMP in a m  2  Hypertension  · Strive keep the systolic blood pressure 118 to 145 given advanced age in acute kidney injury          History of Present Illness   Physician Requesting Consult: Libby Leon MD  Reason for Consult / Principal Problem -  acute kidney injury   HPI- Martine Kocher is a [de-identified] y o  y o  female who we are asked to see because of acute kidney injury  This patient is status post a left BKA on 09/17  The left BKA was found to be nonhealing with dry gangrene  The patient also had a history of fall from bed, landing on her stump  The patient was brought in for a BKA revision and VAC placement  This was performed on 12/05  The patient does have some pain in her stump but denies any shortness of breath, chest pain or pressure, abdominal pain, vomiting, diarrhea, chills, sweats, paroxysmal nocturnal dyspnea orthopnea    She did have a bout of urinary retention last p m  but her subsequent postvoid residuals were not markedly elevated  The patient's intraoperative records were reviewed  She did have bouts of systolic blood pressure less than 100  She does take lisinopril as an outpatient  History obtained from chart review and the patient    Consults    Review of Systems   Constitutional: Positive for fatigue  Negative for appetite change, chills and fever  HENT: Negative for sinus pressure  Eyes: Negative for visual disturbance  Respiratory: Negative for cough, shortness of breath and wheezing  Cardiovascular: Negative for chest pain, palpitations and leg swelling  Gastrointestinal: Negative for abdominal pain, constipation, diarrhea, nausea and vomiting  Endocrine: Negative for polyuria  Genitourinary: Negative for decreased urine volume, difficulty urinating, dysuria, flank pain, frequency, hematuria and urgency  Musculoskeletal: Positive for joint swelling (Some swelling about the left BKA area)  Negative for arthralgias and back pain  Skin: Negative for rash  Neurological: Negative for dizziness, syncope and headaches  Hematological: Does not bruise/bleed easily  Psychiatric/Behavioral: Negative for confusion and sleep disturbance         Historical Information   Patient Active Problem List   Diagnosis    Essential hypertension    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 native heart without angina pectoris    Type 2 diabetes mellitus with complication, with long-term current use of insulin (HCC)    Atrial flutter (HCC)    Hyperlipidemia    Gastroesophageal reflux disease without esophagitis    Moderate mitral stenosis    Acute kidney injury ([address] )    Arthritis of hand    Asthma    Asymptomatic carotid artery stenosis, bilateral    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 ([address] )    Hx of CABG    Chronic anticoagulation    Chronic combined systolic and diastolic congestive heart failure ([address] )    Degenerative joint disease involving multiple joints    Diabetic retinopathy (HCC)    Postoperative hypothyroidism    Migraine headache    Nephrolithiasis    Osteoarthritis of both knees    Paroxysmal atrial fibrillation (HCC)    Ulcer of toe of left foot (HCC)    Ulcer of toe of right foot (HCC)    Prolonged Q-T interval on ECG    Hypokalemia    Hypocalcemia    Left bundle branch block (LBBB)    1st degree AV block    S/P TAVR (transcatheter aortic valve replacement)    Expressive aphasia    Multiple lacunar infarcts    Vomiting    History of CVA (cerebrovascular accident)    [facility]ure [facility]    NSVT (nonsustained ventricular tachycardia) ([facility])    Pulmonary hypertension (HCC)    Anemia    Lactic acidosis    Hypomagnesemia    Bradycardia    Foot pain    PAD (peripheral artery disease) ([facility])    Cellulitis    CKD (chronic kidney disease) stage 3, GFR 30-59 ml/min ([facility])    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 ([facility])    Elevated INR    Hypothyroidism    Rotator cuff disorder, left    Wet gangrene ([facility])    Decubitus ulcer of sacral region, stage 3 ([facility])    Urinary retention    S/P BKA (below knee amputation) unilateral, left (HCC)    S/P below knee amputation, left ([facility])     Past Medical History:   Diagnosis Date    Altered mental status     Anemia     Anticoagulated     Xarelto for Afib/ flutter    Asthma     Atrial flutter (HCC)     maintained on anticoag w/ Xarelto    Bradycardia     CAD (coronary artery disease)     Candidiasis     Carotid stenosis, bilateral     Cataract     Chest pain     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) ([facility])     Chronic fatigue syndrome     Chronic low back pain     Chronic ulcer of toes ([facility])     left and right    Concussion w loss of consciousness of unsp duration, init     CVA (cerebral vascular accident) ([facility] ) 4/17/2018    Diabetes mellitus ([facility] )     type 2, insulin dependent    DJD (degenerative joint disease)     Expressive aphasia     Foot pain     GERD (gastroesophageal reflux disease)     Herpes zoster     HLD (hyperlipidemia)     HTN (hypertension)     Hypothyroidism     Irritable bowel syndrome     Kidney stone     Lumbar radiculopathy     Lyme disease     Dx in hospital 8/2011    MI [facility]ocardial infarction) [facility]     Migraines     Muscle spasm     Non-neoplastic nevus     Nontoxic multinodular goiter     NSVT (nonsustained ventricular tachycardia) (HCC)     Osteoarthritis     Other chronic pain     PAD (peripheral artery disease) (HCC)     Palpitations     Paroxysmal atrial fibrillation (HCC)     Pseudogout     Pulmonary hypertension (HCC)     S/P TAVR (transcatheter aortic valve replacement)     Seizures (HCC)     Severe sepsis ([facility]     Spinal stenosis     Stroke ([facility] ) 05/2018    Transient cerebral ischemia     Trigger ring finger     Viral gastroenteritis      Past Surgical History:   Procedure Laterality Date    APPENDECTOMY      ARTERIOGRAM  12/19/2017    CARDIAC CATHETERIZATION      CHOLECYSTECTOMY      COLONOSCOPY      CORONARY ARTERY BYPASS GRAFT  2004    IR ABDOMINAL ANGIOGRAPHY / INTERVENTION  8/10/2018    IR ABDOMINAL ANGIOGRAPHY / INTERVENTION  8/21/2018    LEG AMPUTATION THROUGH LOWER TIBIA AND FIBULA Left 9/17/2018    Procedure: AMPUTATION BELOW KNEE (BKA); Surgeon: Venkata Myles MD;  [facility];  Service: Vascular    LUMBAR LAMINECTOMY      NH ECHO TRANSESOPHAG R-T 2D W/PRB IMG ACQUISJ I&R N/A 4/3/2018    Procedure: TRANSESOPHAGEAL ECHOCARDIOGRAM (ROBB); Surgeon: Maggie Crooks DO;  [facility];  Service: Cardiac Surgery    NH RE-AMPUTATION LOWER LEG Left 12/5/2018    Procedure: Revision of Left AMPUTATION BELOW KNEE (BKA);   Surgeon: Venkata Myles MD;  [facility];  Service: Vascular    NH REPLACE AORTIC VALVE OPENFEMORAL ARTERY APPROACH N/A 4/3/2018    Procedure: REPLACEMENT AORTIC VALVE TRANSCATHETER (TAVR) TRANSFEMORAL w/ 26MM IVY YEVGENIY S3 VALVE;  Surgeon: Lindsay Mann DO;  [facility];  Service: Cardiac Surgery    WI VEIN BYPASS GRAFT,TIB-PERONEAL Left 9/12/2018    Procedure: Exploration of posterior tibial, medial, and lateral plantar arteries  ;  Surgeon: Cristian Melendez MD;  [facility];  Service: Vascular    THYROIDECTOMY      TOTAL ABDOMINAL HYSTERECTOMY W/ BILATERAL SALPINGOOPHORECTOMY      VAC DRESSING APPLICATION  33/9/8604    Procedure: APPLICATION VAC DRESSING;  Surgeon: Nomi Tipton MD;  [facility];  Service: Vascular     Social History   History   Alcohol Use No     History   Drug Use No     History   Smoking Status    Never Smoker   Smokeless Tobacco    Never Used     Family History   Problem Relation Age of Onset    Cancer Mother     Stroke Mother     Cancer Father     Heart disease Father     Breast cancer Sister     Diabetes Daughter         Passed away January 2017        Meds/Allergies   all current active meds have been reviewed and PTA meds:   Prior to Admission Medications   Prescriptions Last Dose Informant Patient Reported? Taking? Cholecalciferol (VITAMIN D3) 1000 units CHEW 12/4/2018 at Unknown time Outside Facility (Specify) Yes Yes   Sig: Chew 1 tablet (1,000 Units total) daily   Insulin Syringe-Needle U-100 (BD INSULIN SYRINGE ULTRAFINE) 31G X 5/16" 1 ML MISC  Outside Facility (Specify) No No   Sig: Inject under the skin 2 (two) times a day   acetaminophen (TYLENOL) 650 mg CR tablet More than a month at Unknown time Outside Facility (Specify) No No   Sig: Take 1 tablet (650 mg total) by mouth every 6 (six) hours as needed for mild pain Do not take in conjunction with Percocet     albuterol (VENTOLIN HFA) 90 mcg/act inhaler 12/4/2018 at Unknown time Outside Facility (Specify) Yes Yes   Sig: Inhale 108 mcg every 4 (four) hours as needed 2 puffs bid PRN    amLODIPine (NORVASC) 10 mg tablet 12/4/2018 at 0800 Outside Facility (Specify) No Yes   Sig: Take 1 tablet (10 mg total) by mouth daily   amiodarone 200 mg tablet 2018 at 0800 Outside Facility (Specify) No Yes   Sig: Take 1 tablet (200 mg total) by mouth 2 (two) times a day   atorvastatin (LIPITOR) 80 mg tablet 2018 at 0800 Outside Facility (Specify) No Yes   Sig: Take 1 tablet (80 mg total) by mouth daily   beclomethasone (QVAR) 40 MCG/ACT inhaler 2018 at 2000  Yes Yes   Sig: Inhale 2 puffs 2 (two) times a day Rinse mouth after use     bisacodyl (DULCOLAX) 10 mg suppository Unknown at Unknown time Outside Facility (Specify) No No   Sig: Insert 1 suppository (10 mg total) into the rectum daily as needed for constipation   bisacodyl (DULCOLAX) 5 mg EC tablet Unknown at Unknown time Outside Facility (Specify) No No   Sig: Take 2 tablets (10 mg total) by mouth daily   docusate sodium (COLACE) 100 mg capsule 2018 at Unknown time Outside Facility (Specify) No Yes   Sig: Take 1 capsule (100 mg total) by mouth 2 (two) times a day   fentaNYL (DURAGESIC) 12 mcg/hr TD 72 hr patch 2018  Yes Yes   Sig: Place 1 patch on the skin every third day   fluticasone (FLONASE) 50 mcg/act nasal spray 2018 at Unknown time Outside Facility ([address] [place] [address]) Yes Yes   Si spray into each nostril daily   gabapentin (NEURONTIN) 100 mg capsule 2018 at [facility] ([address] [place] [address]) Yes Yes   Sig: Take 600 mg by mouth 2 (two) times a day     insulin detemir (LEVEMIR) 100 units/mL subcutaneous injection 2018 at 0800 Outside Facility (Specify) Yes Yes   Sig: Inject 24 Units under the skin daily in the early morning     levETIRAcetam (KEPPRA) 750 mg tablet 2018 at 0800 Outside Facility ([address] [place] [address]) Yes Yes   Sig: Take 1 tablet (750 mg total) by mouth daily   levothyroxine 100 mcg tablet 2018 at 0600 Outside Facility (Specify) No Yes   Sig: Take 1 tablet (100 mcg total) by mouth daily   lisinopril (ZESTRIL) 10 mg tablet 2018 at Unknown time Outside Facility (Specify) No No   Sig: Take 1 tablet (10 mg total) by mouth daily   Patient taking differently: Take 40 mg by mouth daily     oxyCODONE (ROXICODONE) 5 mg immediate release tablet 2018 Outside Facility (Specify) No Yes   Si tablet (5mg) oral every 3 hours as needed for moderate pain or 2 tablets (10mg) every 3 hours as needed for severe pain   pantoprazole (PROTONIX) 40 mg tablet 2018 at Unknown time  Yes Yes   Sig: Take 40 mg by mouth daily   rivaroxaban (XARELTO) 20 mg tablet 2018 at [facility] (Specify) No Yes   Sig: Take 1 tablet (20 mg total) by mouth daily   senna-docusate sodium (SENOKOT S) 8 6-50 mg per tablet 2018 at Unknown time Outside Facility (Specify) No Yes   Sig: Take 1 tablet by mouth daily at bedtime      Facility-Administered Medications: None       Scheduled Meds:  Current Facility-Administered Medications:  acetaminophen 650 mg Oral Q6H PRN Jonathan Negrete PA-C    albuterol 2 puff Inhalation BID PRN Lelo Almendarez MD    amiodarone 200 mg Oral BID Lelo Almendarez MD    amLODIPine 5 mg Oral Q12H Ora Munoz MD    atorvastatin 80 mg Oral Daily With Ora Thibodeaux MD    bisacodyl 10 mg Oral Daily Lelo Almendarez MD    cholecalciferol 1,000 Units Oral Daily Jonathan Negrete PA-C    docusate sodium 100 mg Oral BID Lelo Almendarez MD    fentaNYL 1 patch Transdermal Q72H Lelo Almendarez MD    fluticasone 1 spray Nasal Daily Jonathan Negrete PA-C    fluticasone 1 puff Inhalation BID Lelo Almendarez MD    gabapentin 600 mg Oral BID Lelo Almendarez MD    HYDROmorphone 0 5 mg Intravenous Q3H PRN Lelo Almendarez MD    insulin detemir 24 Units Subcutaneous Early Morning Jonathan Negrete PA-C    insulin lispro 1-5 Units Subcutaneous TID AC Jonathan Negrete PA-C    insulin lispro 1-5 Units Subcutaneous HS Jonathan Negrete PA-C    levETIRAcetam 750 mg Oral Daily Lelo Almendarez MD    levothyroxine 100 mcg Oral Early Morning Abad Shukla MD    methocarbamol 500 mg Oral Q6H PRN Abad Shukla MD    multi-electrolyte 50 mL/hr Intravenous Continuous Samuel Gee MD Last Rate: 50 mL/hr (18)   naloxone 0 04 mg Intravenous PRN Abad Shukla MD    oxyCODONE 10 mg Oral Q4H PRN Abad Shukla MD    oxyCODONE 5 mg Oral Q4H PRN Abad Shukla MD    pantoprazole 40 mg Oral Early Morning Bisi Alfonso PA-C    rivaroxaban 20 mg Oral Daily Bisi Alfonso PA-C        PRN Meds:   acetaminophen    albuterol    HYDROmorphone    methocarbamol    naloxone    oxyCODONE    oxyCODONE    Continuous Infusions:  multi-electrolyte 50 mL/hr Last Rate: 50 mL/hr (18)       Allergies   Allergen Reactions    Other Chest Pain     IVP-listed as "chest pain" in previous chart, patient stated this occurred "a long time ago" but does not remember exactly occurred   UNABLE TO VERIFY ALLERGIES WITH PATIENT OR SON HORACE> ALLERGIES PER CHART INFORMATION    Contrast [Iodinated Diagnostic Agents] Other (See Comments)     Flash pulm edema    Doxycycline Chest Pain    Ketorolac Other (See Comments)     Chest pain     Levaquin [Levofloxacin] Chest Pain    Ondansetron Chest Pain     Prolonged QT    Toradol [Ketorolac Tromethamine] Chest Pain             /66   Pulse 60   Temp 98 5 °F (36 9 °C) (Oral)   Resp 18   Ht 5' 4" (1 626 m)   Wt 63 5 kg (140 lb)   LMP  (LMP Unknown)   SpO2 91%   BMI 24 03 kg/m²   Temp (24hrs), Av 5 °F (36 9 °C), Min:97 9 °F (36 6 °C), Max:99 3 °F (37 4 °C)      Objective     Intake/Output Summary (Last 24 hours) at 18 1214  Last data filed at 18 1019   Gross per 24 hour   Intake          2986 92 ml   Output              575 ml   Net          2411 92 ml       I/O last 24 hours:   In: 2986 9 [P O :960; I V : 9]  Out: 575 [Urine:575]      Current Weight: Weight - Scale: 63 5 kg (140 lb)  First Weight: Weight - Scale: 63 5 kg (140 lb)    PHYSICAL EXAM:     General -      the patient is awake, alert, cooperative and in no apparent distress  HENT  -        normocephalic/atraumatic, mucous membranes were dry  Eyes    -        extraocular movements were intact, no scleral icterus was noted  Neck    -        no jugular venous distention was noted, no thyromegaly was noted, trachea midline  Chest  -         clear to auscultation percussion, decreased inspiratory effort, no rales/rhonchi or wheezing were noted  CVS  -           S1-S2, no pericardial friction rub or S3 were appreciated  Abdomen -    soft, slightly distended, decreased bowel sounds, no rebound or guarding was noted  Extremities-   right lower extremity 0 to trace edema, left BKA with bandage in VAC in place, no cyanosis of the right lower extremity  Skin   -           no hives were noted  Neuro   -        alert and answering questions appropriately  Psych   -        mood affect were appropriate      Invasive Devices     Peripheral Intravenous Line            Peripheral IV 12/05/18 Left Wrist less than 1 day    Peripheral IV 12/05/18 Right Arm less than 1 day    Peripheral IV 12/05/18 Right Forearm less than 1 day          Drain            Urethral Catheter Latex 18 Fr  77 days    Negative Pressure Wound Therapy (V A C ) Knee Left less than 1 day                Lab Results:      Results from last 7 days  Lab Units 12/06/18  0454 12/05/18  1607   WBC Thousand/uL 10 43*  --    HEMOGLOBIN g/dL 9 9*  --    HEMATOCRIT % 33 7*  --    PLATELETS Thousands/uL 241 234   NEUTROS PCT % 69  --    LYMPHS PCT % 20  --    MONOS PCT % 8  --    EOS PCT % 2  --    POTASSIUM mmol/L 3 8  --    CHLORIDE mmol/L 101  --    CO2 mmol/L 28  --    BUN mg/dL 17  --    CREATININE mg/dL 1 16  --    CALCIUM mg/dL 7 9*  --    ALK PHOS U/L 59  --    ALT U/L 53  --    AST U/L 57*  --        CUTURES:  Lab Results   Component Value Date    BLOODCX No Growth After 5 Days  08/08/2018    BLOODCX No Growth After 5 Days  08/08/2018    BLOODCX No Growth After 5 Days  05/01/2018    BLOODCX No Growth After 5 Days  05/01/2018    URINECX >100,000 cfu/ml Proteus mirabilis (A) 06/12/2018         Pertinent studies were reviewed          Portions of the record may have been created with voice recognition software  Occasional wrong word or "sound a like" substitutions may have occurred due to the inherent limitations of voice recognition software  Read the chart carefully and recognize, using context, where substitutions have occurred  If you have any questions, please contact the dictating provider

## 2018-12-06 NOTE — ASSESSMENT & PLAN NOTE
Left BKA site with delayed healing and gangrene    S/P Left BKA revision, washout and VAC placement    Plan:  --Continue local wound care  --Arrange outpatient wound VAC therapy  --Saline lock  --Pain control  --Discharge when [facility] arrangements made

## 2018-12-06 NOTE — CONSULTS
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, Status post left BKA   Reason for Consult:  Type 2 diabetes mellitus    Impression:  Patient Active Problem List   Diagnosis    Essential hypertension    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 native heart without angina pectoris    Type 2 diabetes mellitus with complication, with long-term current use of insulin ([facility])    Atrial flutter (HCC)    Hyperlipidemia    Gastroesophageal reflux disease without esophagitis    Moderate mitral stenosis    Acute kidney injury ([facility] )    Arthritis of hand    Asthma    Asymptomatic carotid artery stenosis, bilateral    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 ([facility]    Hx of CABG    Chronic anticoagulation    Chronic combined systolic and diastolic congestive heart failure (HCC)    Degenerative joint disease involving multiple joints    Diabetic retinopathy (HCC)    Postoperative hypothyroidism    Migraine headache    Nephrolithiasis    Osteoarthritis of both knees    Paroxysmal atrial fibrillation (HCC)    Ulcer of toe of left foot (HCC)    Ulcer of toe of right foot (HCC)    Prolonged Q-T interval on ECG    Hypokalemia    Hypocalcemia    Left bundle branch block (LBBB)    1st degree AV block    S/P TAVR (transcatheter aortic valve replacement)    Expressive aphasia    Multiple lacunar infarcts    Vomiting    History of CVA (cerebrovascular accident)    Seizure ([facility] )    NSVT (nonsustained ventricular tachycardia) ([facility])    Pulmonary hypertension (HCC)    Anemia    Lactic acidosis    Hypomagnesemia    Bradycardia    Foot pain    PAD (peripheral artery disease) (HCC)    Cellulitis    CKD (chronic kidney disease) stage 3, GFR 30-59 ml/min (HCC)    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)    Elevated INR    Hypothyroidism    Rotator cuff disorder, left    Wet gangrene (HCC)    Decubitus ulcer of sacral region, stage 3 (HCC)    Urinary retention    S/P BKA (below knee amputation) unilateral, left (HCC)    S/P below knee amputation, left (HCC)       A/P:  Type 2 diabetes mellitus  · 12/6-A1c 6 6%  Currently under control  · Continue regular diet  Glucose has been stable ranging from 90s to 170s  · Will continue same insulin regimen Levemir 24 units in the morning with insulin sliding scale coverage  CKD stage 3   · Nephrology following  Continue recommendations per them      HPI: Larisa Reilly is a [de-identified] y o  female who presents with past medical history of hyperlipidemia, type 2 diabetes, hypertension, CKD stage 3 coronary artery disease, status which CABG, aortic stenosis s/p TAVR presented for left amputation below-knee per vascular surgery  Patient was recently in the outpatient setting with a left BKA that was nonhealing with dry gangrene  Patient presented for vascular surgery revision of the BKA  Not currently status post wound VAC placement  Patient also has complications of her atherosclerotic vascular disease with current dry gangrene of the right foot  Internal Medicine was consulted for diabetes management  Patient currently is not experiencing any pain  She denies any positive review of systems        History obtained from chart, patient    PMH:  Past Medical History:   Diagnosis Date    Altered mental status     Anemia     Anticoagulated     Xarelto for Afib/ flutter    Asthma     Atrial flutter ([address] )     maintained on anticoag w/ Xarelto    Bradycardia     CAD (coronary artery disease)     Candidiasis     Carotid stenosis, bilateral     Cataract     Chest pain     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) (HCC)     Chronic fatigue syndrome     Chronic low back pain     Chronic ulcer of toes (HCC)     left and right    Concussion w loss of consciousness of unsp duration, init     CVA (cerebral vascular accident) [facility] ) 4/17/2018    Diabetes mellitus ([facility]     type 2, insulin dependent    DJD (degenerative joint disease)     Expressive aphasia     Foot pain     GERD (gastroesophageal reflux disease)     Herpes zoster     HLD (hyperlipidemia)     HTN (hypertension)     Hypothyroidism     Irritable bowel syndrome     Kidney stone     Lumbar radiculopathy     Lyme disease     Dx in hospital 8/2011    MI (myocardial infarction) ([facility]     Migraines     Muscle spasm     Non-neoplastic nevus     Nontoxic multinodular goiter     NSVT [facility]nonsustained ventricular tachycardia) [facility]     Osteoarthritis     Other chronic pain     PAD (peripheral artery disease) ([facility])     Palpitations     Paroxysmal atrial fibrillation (HCC)     Pseudogout     Pulmonary hypertension (HCC)     S/P TAVR (transcatheter aortic valve replacement)     Seizures ([facility])     Severe sepsis [facility]     Spinal stenosis     Stroke ([facility] ) 05/2018    Transient cerebral ischemia     Trigger ring finger     Viral gastroenteritis         PSH:  Past Surgical History:   Procedure Laterality Date    APPENDECTOMY      ARTERIOGRAM  12/19/2017    CARDIAC CATHETERIZATION      CHOLECYSTECTOMY      COLONOSCOPY      CORONARY ARTERY BYPASS GRAFT  2004    IR ABDOMINAL ANGIOGRAPHY / INTERVENTION  8/10/2018    IR ABDOMINAL ANGIOGRAPHY / INTERVENTION  8/21/2018    LEG AMPUTATION THROUGH LOWER TIBIA AND FIBULA Left 9/17/2018    Procedure: AMPUTATION BELOW KNEE (BKA);   Surgeon: Iqra Heller MD;  [facility];  Service: Vascular    LUMBAR LAMINECTOMY      HI ECHO TRANSESOPHAG R-T 2D W/PRB IMG ACQUISJ I&R N/A 4/3/2018    Procedure: TRANSESOPHAGEAL ECHOCARDIOGRAM (ROBB); Surgeon: DO Angela;  [facility];  Service: Cardiac Surgery    MD RE-AMPUTATION LOWER LEG Left 12/5/2018    Procedure: Revision of Left AMPUTATION BELOW KNEE (BKA); Surgeon: Leon Mcgregor MD;  [facility];  Service: Vascular    MD REPLACE AORTIC VALVE OPENFEMORAL ARTERY APPROACH N/A 4/3/2018    Procedure: REPLACEMENT AORTIC VALVE TRANSCATHETER (TAVR) TRANSFEMORAL w/ 26MM IVY YEVGENIY S3 VALVE;  Surgeon: DO Angela;  [facility];  Service: Cardiac Surgery    MD VEIN BYPASS GRAFT,TIB-PERONEAL Left 9/12/2018    Procedure: Exploration of posterior tibial, medial, and lateral plantar arteries  ;  Surgeon: Silver Sprague MD;  [facility];  Service: Vascular    THYROIDECTOMY      TOTAL ABDOMINAL HYSTERECTOMY W/ BILATERAL SALPINGOOPHORECTOMY      VAC DRESSING APPLICATION  98/4/7785    Procedure: APPLICATION VAC DRESSING;  Surgeon: Leon Mcgregor MD;  [facility];  Service: Vascular       Allergies:    Allergies   Allergen Reactions    Other Chest Pain     IVP-listed as "chest pain" in previous chart, patient stated this occurred "a long time ago" but does not remember exactly occurred   [facility]> ALLERGIES PER CHART INFORMATION    Contrast [Iodinated Diagnostic Agents] Other (See Comments)     Flash pulm edema    Doxycycline Chest Pain    Ketorolac Other (See Comments)     Chest pain     Levaquin [Levofloxacin] Chest Pain    Ondansetron Chest Pain     Prolonged QT    Toradol [Ketorolac Tromethamine] Chest Pain       Family History:   Family History   Problem Relation Age of Onset    Cancer Mother     Stroke Mother     Cancer Father     Heart disease Father     Breast cancer Sister     Diabetes Daughter         Passed away January 2017        Social History:   History   Smoking Status    Never Smoker   Smokeless Tobacco    Never Used    History   Alcohol Use No    History   Drug Use No Home Medications:   Prior to Admission medications    Medication Sig Start Date End Date Taking? Authorizing Provider   albuterol (VENTOLIN HFA) 90 mcg/act inhaler Inhale 108 mcg every 4 (four) hours as needed 2 puffs bid PRN    Yes Historical Provider, MD   amiodarone 200 mg tablet Take 1 tablet (200 mg total) by mouth 2 (two) times a day 6/19/18  Yes Kwadwo Driscoll MD   amLODIPine (NORVASC) 10 mg tablet Take 1 tablet (10 mg total) by mouth daily 8/24/18  Yes Cheryle Drilling, DO   atorvastatin (LIPITOR) 80 mg tablet Take 1 tablet (80 mg total) by mouth daily 4/26/18  Yes Hyun Escalante MD   beclomethasone (QVAR) 40 MCG/ACT inhaler Inhale 2 puffs 2 (two) times a day Rinse mouth after use     Yes Historical Provider, MD   Cholecalciferol (VITAMIN D3) 1000 units CHEW Chew 1 tablet (1,000 Units total) daily 9/24/18  Yes Mitzy Agrawal PA-C   docusate sodium (COLACE) 100 mg capsule Take 1 capsule (100 mg total) by mouth 2 (two) times a day 8/24/18  Yes Cheryle Drilling, DO   fentaNYL (DURAGESIC) 12 mcg/hr TD 72 hr patch Place 1 patch on the skin every third day   Yes Historical Provider, MD   fluticasone (FLONASE) 50 mcg/act nasal spray 1 spray into each nostril daily   Yes Historical Provider, MD   gabapentin (NEURONTIN) 100 mg capsule Take 600 mg by mouth 2 (two) times a day   9/24/18  Yes Mitzy Agrawal PA-C   insulin detemir (LEVEMIR) 100 units/mL subcutaneous injection Inject 24 Units under the skin daily in the early morning     Yes Historical Provider, MD   levETIRAcetam (KEPPRA) 750 mg tablet Take 1 tablet (750 mg total) by mouth daily 9/24/18  Yes Mitzy Agrawal PA-C   levothyroxine 100 mcg tablet Take 1 tablet (100 mcg total) by mouth daily 6/4/18  Yes RAMO Ling   oxyCODONE (ROXICODONE) 5 mg immediate release tablet 1 tablet (5mg) oral every 3 hours as needed for moderate pain or 2 tablets (10mg) every 3 hours as needed for severe pain 9/24/18  Yes Rebeca Fitzgerald MAGDALENO Salazar   pantoprazole (PROTONIX) 40 mg tablet Take 40 mg by mouth daily   Yes Historical Provider, MD   rivaroxaban (XARELTO) 20 mg tablet Take 1 tablet (20 mg total) by mouth daily 6/4/18  Yes RAMO Ling   senna-docusate sodium (SENOKOT S) 8 6-50 mg per tablet Take 1 tablet by mouth daily at bedtime 9/24/18  Yes Raymundo Carson PA-C   acetaminophen (TYLENOL) 650 mg CR tablet Take 1 tablet (650 mg total) by mouth every 6 (six) hours as needed for mild pain Do not take in conjunction with Percocet  9/24/18   Raymundo Carson PA-C   bisacodyl (DULCOLAX) 10 mg suppository Insert 1 suppository (10 mg total) into the rectum daily as needed for constipation 9/24/18   Raymundo Carson PA-C   bisacodyl (DULCOLAX) 5 mg EC tablet Take 2 tablets (10 mg total) by mouth daily 9/25/18   Raymundo Carson PA-C   Insulin Syringe-Needle U-100 (BD INSULIN SYRINGE ULTRAFINE) 31G X 5/16" 1 ML MISC Inject under the skin 2 (two) times a day 6/27/18   RAMO Zuniga   lisinopril (ZESTRIL) 10 mg tablet Take 1 tablet (10 mg total) by mouth daily  Patient taking differently: Take 40 mg by mouth daily   8/24/18   Rhea Flores,        ROS:   Review of Systems   Constitutional: Negative for activity change, chills and fever  Eyes: Negative for visual disturbance  Respiratory: Negative for cough, chest tightness and shortness of breath  Cardiovascular: Negative for chest pain and leg swelling  Gastrointestinal: Negative for abdominal pain, constipation, diarrhea, nausea and vomiting  Endocrine: Negative for cold intolerance and heat intolerance  Genitourinary: Negative for difficulty urinating  Musculoskeletal: Negative for gait problem  Skin: Positive for wound  Negative for rash  Allergic/Immunologic: Negative  Neurological: Negative  Negative for dizziness, weakness and light-headedness  Hematological: Negative  Psychiatric/Behavioral: Negative  All other systems reviewed and are negative  Vitals:   Vitals:    12/05/18 1507 12/05/18 2337 12/06/18 0313 12/06/18 0707   BP:  133/72 109/53 147/66   Pulse:  65 70 60   Resp:  16 16 18   Temp:  98 5 °F (36 9 °C) 98 2 °F (36 8 °C) 98 5 °F (36 9 °C)   TempSrc:  Oral Oral Oral   SpO2: 95% 96% 97% 91%   Weight:       Height:         Temp  Min: 97 9 °F (36 6 °C)  Max: 99 3 °F (37 4 °C)  IBW: 54 7 kg  Body mass index is 24 03 kg/m²  PHYSICAL EXAM:  Physical Exam   Constitutional: She is oriented to person, place, and time  She appears well-developed and well-nourished  Thin cachectic elderly female in no acute distress   HENT:   Head: Normocephalic and atraumatic  Mouth/Throat: Oropharynx is clear and moist  No oropharyngeal exudate  Eyes: Pupils are equal, round, and reactive to light  Cardiovascular: Normal rate, regular rhythm and normal heart sounds  Pulmonary/Chest: Effort normal and breath sounds normal  No respiratory distress  Musculoskeletal:   Status post left BKA   Neurological: She is alert and oriented to person, place, and time  Alert and oriented x2   Skin: Skin is warm and dry         Labs:     Results from last 7 days  Lab Units 12/06/18  0454 12/05/18  1607   WBC Thousand/uL 10 43*  --    HEMOGLOBIN g/dL 9 9*  --    HEMATOCRIT % 33 7*  --    PLATELETS Thousands/uL 241 234   NEUTROS PCT % 69  --    MONOS PCT % 8  --        Results from last 7 days  Lab Units 12/06/18  0454   POTASSIUM mmol/L 3 8   CHLORIDE mmol/L 101   CO2 mmol/L 28   BUN mg/dL 17   CREATININE mg/dL 1 16   CALCIUM mg/dL 7 9*   ALK PHOS U/L 59   ALT U/L 53   AST U/L 57*         Lab Results   Component Value Date    PHOS 3 8 06/20/2018    PHOS 4 9 (H) 05/24/2018    PHOS 2 9 05/01/2018          No results found for: TROPONINT  ABG:  Lab Results   Component Value Date    PHART 7 295 (L) 05/01/2018    AAF1BQB 40 5 05/01/2018    PO2ART 74 0 (L) 05/01/2018    YJR1IEA 19 3 (L) 05/01/2018    BEART -6 8 05/01/2018 SOURCE Radial, Right 05/01/2018        Imaging: I have personally reviewed pertinent reports  EKG: This was personally reviewed by myself  Micro:  Blood Culture:   Lab Results   Component Value Date    BLOODCX No Growth After 5 Days  08/08/2018    BLOODCX No Growth After 5 Days  08/08/2018    BLOODCX No Growth After 5 Days  05/01/2018    BLOODCX No Growth After 5 Days   05/01/2018     Urine Culture:   Lab Results   Component Value Date    URINECX >100,000 cfu/ml Proteus mirabilis (A) 06/12/2018     Sputum Culture: No components found for: SPUTUMCX  Wound Culure:   Lab Results   Component Value Date    WOUNDCULT 4+ Growth of Pseudomonas aeruginosa (A) 09/11/2018    WOUNDCULT 4+ Growth of Klebsiella oxytoca (A) 09/11/2018    WOUNDCULT 4+ Growth of Staphylococcus aureus (A) 09/11/2018    WOUNDCULT 2+ Growth of Klebsiella oxytoca (A) 08/12/2018    WOUNDCULT 2+ Growth of Staphylococcus aureus (A) 08/12/2018    WOUNDCULT 2+ Growth of Citrobacter amalonaticus complex (A) 08/12/2018    WOUNDCULT 2+ Growth of Staphylococcus aureus (A) 08/12/2018       VTE Pharmacologic Prophylaxis:  Xarelto  VTE Mechanical Prophylaxis: sequential compression device    Tracey Salinas MD  12/6/2018 12:46 PM

## 2018-12-06 NOTE — PROGRESS NOTES
Vascular Surgery    Progress Note - Ximena Tello 1940, [de-identified] y o  female MRN: [id number]    Unit/Bed#: [facility] 506-01 Encounter: [id number]    Primary Care Provider: Ayleen Smith MD   Date and time admitted to hospital: 12/5/2018 11:05 AM    * S/P below knee amputation, left ([address] )   Assessment & Plan    Left BKA site with delayed healing and gangrene    S/P Left BKA revision, washout and VAC placement    Plan:  --Continue local wound care  --Arrange outpatient wound VAC therapy  --Saline lock  --Pain control  --Discharge when [facility] ED arrangements made           Subjective:  Pt doing well, no complaints    Vitals:  /66   Pulse 60   Temp 98 5 °F (36 9 °C) (Oral)   Resp 18   Ht 5' 4" (1 626 m)   Wt 63 5 kg (140 lb)   LMP  (LMP Unknown)   SpO2 91%   BMI 24 03 kg/m²     I/Os:  I/O last 3 completed shifts: In: 2046 9 [P O :840; I V :1206 9]  Out: 575 [Urine:575]  No intake/output data recorded  Lab Results and Cultures:   Lab Results   Component Value Date    WBC 10 43 (H) 12/06/2018    HGB 9 9 (L) 12/06/2018    HCT 33 7 (L) 12/06/2018    MCV 79 (L) 12/06/2018     12/06/2018     Lab Results   Component Value Date    GLUCOSE 104 09/12/2018    CALCIUM 7 9 (L) 12/06/2018     10/14/2015    K 3 8 12/06/2018    CO2 28 12/06/2018     12/06/2018    BUN 17 12/06/2018    CREATININE 1 16 12/06/2018     Lab Results   Component Value Date    INR 1 28 (H) 09/18/2018    INR 1 52 (H) 09/09/2018    INR 1 77 (H) 09/08/2018    PROTIME 16 1 (H) 09/18/2018    PROTIME 18 4 (H) 09/09/2018    PROTIME 20 7 (H) 09/08/2018        Blood Culture:   Lab Results   Component Value Date    BLOODCX No Growth After 5 Days  08/08/2018    BLOODCX No Growth After 5 Days   08/08/2018   ,   Urinalysis:   Lab Results   Component Value Date    COLORU yellow 06/12/2018    COLORU Yellow 05/18/2018    COLORU Yellow 12/21/2016    CLARITYU cloudy 06/12/2018    CLARITYU Clear 05/18/2018    CLARITYU Transparent 12/21/2016    SPECGRAV 1 010 05/18/2018    SPECGRAV 1 010 12/21/2016    PHUR 5 0 05/18/2018    PHUR 8 12/21/2016    LEUKOCYTESUR moderate 06/12/2018    LEUKOCYTESUR Small (A) 05/18/2018    LEUKOCYTESUR - 12/21/2016    NITRITE neg 06/12/2018    NITRITE Negative 05/18/2018    NITRITE - 12/21/2016    PROTEINUA - 12/21/2016    GLUCOSEU neg 06/12/2018    GLUCOSEU Negative 05/18/2018    GLUCOSEU Negative 05/28/2015    KETONESU neg 06/12/2018    KETONESU Negative 05/18/2018    KETONESU - 12/21/2016    BILIRUBINUR neg 06/12/2018    BILIRUBINUR Negative 05/18/2018    BILIRUBINUR Negative 05/28/2015    BLOODU small 06/12/2018    BLOODU Negative 05/18/2018    BLOODU - 12/21/2016   ,   Urine Culture:   Lab Results   Component Value Date    URINECX >100,000 cfu/ml Proteus mirabilis (A) 06/12/2018   ,   Wound Culure:   Lab Results   Component Value Date    WOUNDCULT 4+ Growth of Pseudomonas aeruginosa (A) 09/11/2018    WOUNDCULT 4+ Growth of Klebsiella oxytoca (A) 09/11/2018    WOUNDCULT 4+ Growth of Staphylococcus aureus (A) 09/11/2018       Medications:  Current Facility-Administered Medications   Medication Dose Route Frequency    acetaminophen (TYLENOL) tablet 650 mg  650 mg Oral Q6H PRN    albuterol (PROVENTIL HFA,VENTOLIN HFA) inhaler 2 puff  2 puff Inhalation BID PRN    amiodarone tablet 200 mg  200 mg Oral BID    amLODIPine (NORVASC) tablet 5 mg  5 mg Oral Q12H    atorvastatin (LIPITOR) tablet 80 mg  80 mg Oral Daily With Dinner    bisacodyl (DULCOLAX) EC tablet 10 mg  10 mg Oral Daily    cholecalciferol (VITAMIN D3) tablet 1,000 Units  1,000 Units Oral Daily    docusate sodium (COLACE) capsule 100 mg  100 mg Oral BID    fentaNYL (DURAGESIC) 12 mcg/hr TD 72 hr patch 1 patch  1 patch Transdermal Q72H    fluticasone (FLONASE) 50 mcg/act nasal spray 1 spray  1 spray Nasal Daily    fluticasone (FLOVENT HFA) 44 mcg/act inhaler 1 puff  1 puff Inhalation BID    gabapentin (NEURONTIN) capsule 600 mg  600 mg Oral BID    HYDROmorphone (DILAUDID) injection 0 5 mg  0 5 mg Intravenous Q3H PRN    insulin detemir (LEVEMIR) subcutaneous injection 24 Units  24 Units Subcutaneous Early Morning    insulin lispro (HumaLOG) 100 units/mL subcutaneous injection 1-5 Units  1-5 Units Subcutaneous TID AC    insulin lispro (HumaLOG) 100 units/mL subcutaneous injection 1-5 Units  1-5 Units Subcutaneous HS    levETIRAcetam (KEPPRA) tablet 750 mg  750 mg Oral Daily    levothyroxine tablet 100 mcg  100 mcg Oral Early Morning    methocarbamol (ROBAXIN) tablet 500 mg  500 mg Oral Q6H PRN    naloxone (NARCAN) 0 04 mg/mL syringe 0 04 mg  0 04 mg Intravenous PRN    oxyCODONE (ROXICODONE) IR tablet 10 mg  10 mg Oral Q4H PRN    oxyCODONE (ROXICODONE) IR tablet 5 mg  5 mg Oral Q4H PRN    pantoprazole (PROTONIX) EC tablet 40 mg  40 mg Oral Early Morning    rivaroxaban (XARELTO) tablet 20 mg  20 mg Oral Daily       Physical Exam:    General appearance: alert and oriented, in no acute distress  Lungs: clear to auscultation bilaterally  Heart: regular rate and rhythm, S1, S2 normal, no murmur, click, rub or gallop  Abdomen: soft, non-tender; bowel sounds normal; no masses,  no organomegaly  Extremities: Left BKA with VAC, Right foot with rubor and dry stable gangrene    Wound/Incision:  Left BKA VAC intact     Clyde Roach PA-C  12/6/2018

## 2018-12-07 LAB
ALBUMIN SERPL BCP-MCNC: 2.5 G/DL (ref 3.5–5)
ALP SERPL-CCNC: 60 U/L (ref 46–116)
ALT SERPL W P-5'-P-CCNC: 37 U/L (ref 12–78)
ANION GAP SERPL CALCULATED.3IONS-SCNC: 8 MMOL/L (ref 4–13)
AST SERPL W P-5'-P-CCNC: 53 U/L (ref 5–45)
BASOPHILS # BLD AUTO: 0.03 THOUSANDS/ΜL (ref 0–0.1)
BASOPHILS NFR BLD AUTO: 0 % (ref 0–1)
BILIRUB SERPL-MCNC: 0.42 MG/DL (ref 0.2–1)
BUN SERPL-MCNC: 19 MG/DL (ref 5–25)
CALCIUM SERPL-MCNC: 8 MG/DL (ref 8.3–10.1)
CHLORIDE SERPL-SCNC: 104 MMOL/L (ref 100–108)
CO2 SERPL-SCNC: 27 MMOL/L (ref 21–32)
CREAT SERPL-MCNC: 0.82 MG/DL (ref 0.6–1.3)
EOSINOPHIL # BLD AUTO: 0.14 THOUSAND/ΜL (ref 0–0.61)
EOSINOPHIL NFR BLD AUTO: 2 % (ref 0–6)
ERYTHROCYTE [DISTWIDTH] IN BLOOD BY AUTOMATED COUNT: 16.2 % (ref 11.6–15.1)
GFR SERPL CREATININE-BSD FRML MDRD: 68 ML/MIN/1.73SQ M
GLUCOSE SERPL-MCNC: 115 MG/DL (ref 65–140)
GLUCOSE SERPL-MCNC: 132 MG/DL (ref 65–140)
GLUCOSE SERPL-MCNC: 141 MG/DL (ref 65–140)
GLUCOSE SERPL-MCNC: 143 MG/DL (ref 65–140)
GLUCOSE SERPL-MCNC: 161 MG/DL (ref 65–140)
GLUCOSE SERPL-MCNC: 44 MG/DL (ref 65–140)
GLUCOSE SERPL-MCNC: 51 MG/DL (ref 65–140)
HCT VFR BLD AUTO: 33.4 % (ref 34.8–46.1)
HGB BLD-MCNC: 9.8 G/DL (ref 11.5–15.4)
IMM GRANULOCYTES # BLD AUTO: 0.03 THOUSAND/UL (ref 0–0.2)
IMM GRANULOCYTES NFR BLD AUTO: 0 % (ref 0–2)
LYMPHOCYTES # BLD AUTO: 1.4 THOUSANDS/ΜL (ref 0.6–4.47)
LYMPHOCYTES NFR BLD AUTO: 15 % (ref 14–44)
MAGNESIUM SERPL-MCNC: 1.7 MG/DL (ref 1.6–2.6)
MCH RBC QN AUTO: 23.4 PG (ref 26.8–34.3)
MCHC RBC AUTO-ENTMCNC: 29.3 G/DL (ref 31.4–37.4)
MCV RBC AUTO: 80 FL (ref 82–98)
MONOCYTES # BLD AUTO: 0.81 THOUSAND/ΜL (ref 0.17–1.22)
MONOCYTES NFR BLD AUTO: 9 % (ref 4–12)
NEUTROPHILS # BLD AUTO: 7.17 THOUSANDS/ΜL (ref 1.85–7.62)
NEUTS SEG NFR BLD AUTO: 74 % (ref 43–75)
NRBC BLD AUTO-RTO: 0 /100 WBCS
PHOSPHATE SERPL-MCNC: 4.1 MG/DL (ref 2.3–4.1)
PLATELET # BLD AUTO: 217 THOUSANDS/UL (ref 149–390)
PMV BLD AUTO: 10.6 FL (ref 8.9–12.7)
POTASSIUM SERPL-SCNC: 3.7 MMOL/L (ref 3.5–5.3)
PROT SERPL-MCNC: 5.8 G/DL (ref 6.4–8.2)
RBC # BLD AUTO: 4.19 MILLION/UL (ref 3.81–5.12)
SODIUM SERPL-SCNC: 139 MMOL/L (ref 136–145)
WBC # BLD AUTO: 9.58 THOUSAND/UL (ref 4.31–10.16)

## 2018-12-07 PROCEDURE — G8988 SELF CARE GOAL STATUS: HCPCS

## 2018-12-07 PROCEDURE — 97167 OT EVAL HIGH COMPLEX 60 MIN: CPT

## 2018-12-07 PROCEDURE — 85025 COMPLETE CBC W/AUTO DIFF WBC: CPT | Performed by: INTERNAL MEDICINE

## 2018-12-07 PROCEDURE — 84100 ASSAY OF PHOSPHORUS: CPT | Performed by: INTERNAL MEDICINE

## 2018-12-07 PROCEDURE — 83735 ASSAY OF MAGNESIUM: CPT | Performed by: INTERNAL MEDICINE

## 2018-12-07 PROCEDURE — 99233 SBSQ HOSP IP/OBS HIGH 50: CPT | Performed by: INTERNAL MEDICINE

## 2018-12-07 PROCEDURE — 99024 POSTOP FOLLOW-UP VISIT: CPT | Performed by: SURGERY

## 2018-12-07 PROCEDURE — 2W0RX6Z CHANGE PRESSURE DRESSING ON LEFT LOWER LEG: ICD-10-PCS | Performed by: SURGERY

## 2018-12-07 PROCEDURE — 82948 REAGENT STRIP/BLOOD GLUCOSE: CPT

## 2018-12-07 PROCEDURE — 0T9B70Z DRAINAGE OF BLADDER WITH DRAINAGE DEVICE, VIA NATURAL OR ARTIFICIAL OPENING: ICD-10-PCS | Performed by: SURGERY

## 2018-12-07 PROCEDURE — G8987 SELF CARE CURRENT STATUS: HCPCS

## 2018-12-07 PROCEDURE — 99232 SBSQ HOSP IP/OBS MODERATE 35: CPT | Performed by: INTERNAL MEDICINE

## 2018-12-07 PROCEDURE — 80053 COMPREHEN METABOLIC PANEL: CPT | Performed by: INTERNAL MEDICINE

## 2018-12-07 RX ORDER — DEXTROSE MONOHYDRATE 25 G/50ML
INJECTION, SOLUTION INTRAVENOUS
Status: COMPLETED
Start: 2018-12-07 | End: 2018-12-07

## 2018-12-07 RX ADMIN — BISACODYL 10 MG: 5 TABLET, COATED ORAL at 09:10

## 2018-12-07 RX ADMIN — PANTOPRAZOLE SODIUM 40 MG: 40 TABLET, DELAYED RELEASE ORAL at 06:17

## 2018-12-07 RX ADMIN — GABAPENTIN 600 MG: 300 CAPSULE ORAL at 09:10

## 2018-12-07 RX ADMIN — VITAMIN D, TAB 1000IU (100/BT) 1000 UNITS: 25 TAB at 09:17

## 2018-12-07 RX ADMIN — GABAPENTIN 600 MG: 300 CAPSULE ORAL at 17:21

## 2018-12-07 RX ADMIN — ATORVASTATIN CALCIUM 80 MG: 80 TABLET, FILM COATED ORAL at 16:44

## 2018-12-07 RX ADMIN — AMIODARONE HYDROCHLORIDE 200 MG: 200 TABLET ORAL at 17:21

## 2018-12-07 RX ADMIN — LEVOTHYROXINE SODIUM 100 MCG: 100 TABLET ORAL at 06:18

## 2018-12-07 RX ADMIN — DOCUSATE SODIUM 100 MG: 100 CAPSULE, LIQUID FILLED ORAL at 09:10

## 2018-12-07 RX ADMIN — FLUTICASONE PROPIONATE 1 PUFF: 44 AEROSOL, METERED RESPIRATORY (INHALATION) at 09:10

## 2018-12-07 RX ADMIN — RIVAROXABAN 20 MG: 20 TABLET, FILM COATED ORAL at 09:14

## 2018-12-07 RX ADMIN — SODIUM CHLORIDE, SODIUM GLUCONATE, SODIUM ACETATE, POTASSIUM CHLORIDE, MAGNESIUM CHLORIDE, SODIUM PHOSPHATE, DIBASIC, AND POTASSIUM PHOSPHATE 75 ML/HR: .53; .5; .37; .037; .03; .012; .00082 INJECTION, SOLUTION INTRAVENOUS at 02:07

## 2018-12-07 RX ADMIN — HYDROMORPHONE HYDROCHLORIDE 0.5 MG: 1 INJECTION, SOLUTION INTRAMUSCULAR; INTRAVENOUS; SUBCUTANEOUS at 10:50

## 2018-12-07 RX ADMIN — AMLODIPINE BESYLATE 5 MG: 5 TABLET ORAL at 09:10

## 2018-12-07 RX ADMIN — OXYCODONE HYDROCHLORIDE 5 MG: 5 TABLET ORAL at 20:09

## 2018-12-07 RX ADMIN — FLUTICASONE PROPIONATE 1 PUFF: 44 AEROSOL, METERED RESPIRATORY (INHALATION) at 20:10

## 2018-12-07 RX ADMIN — SODIUM CHLORIDE, SODIUM GLUCONATE, SODIUM ACETATE, POTASSIUM CHLORIDE, MAGNESIUM CHLORIDE, SODIUM PHOSPHATE, DIBASIC, AND POTASSIUM PHOSPHATE 50 ML/HR: .53; .5; .37; .037; .03; .012; .00082 INJECTION, SOLUTION INTRAVENOUS at 19:00

## 2018-12-07 RX ADMIN — FLUTICASONE PROPIONATE 1 SPRAY: 50 SPRAY, METERED NASAL at 09:10

## 2018-12-07 RX ADMIN — INSULIN LISPRO 1 UNITS: 100 INJECTION, SOLUTION INTRAVENOUS; SUBCUTANEOUS at 16:41

## 2018-12-07 RX ADMIN — LEVETIRACETAM 750 MG: 750 TABLET ORAL at 09:14

## 2018-12-07 RX ADMIN — AMIODARONE HYDROCHLORIDE 200 MG: 200 TABLET ORAL at 09:10

## 2018-12-07 RX ADMIN — DOCUSATE SODIUM 100 MG: 100 CAPSULE, LIQUID FILLED ORAL at 17:21

## 2018-12-07 RX ADMIN — DEXTROSE 50 % IN WATER (D50W) INTRAVENOUS SYRINGE 50 ML: at 06:26

## 2018-12-07 RX ADMIN — AMLODIPINE BESYLATE 5 MG: 5 TABLET ORAL at 20:09

## 2018-12-07 NOTE — PROGRESS NOTES
IM Residency Progress Note   Unit/Bed#: [facility] 506-01 Encounter: [id number]  SOD Team C       Nadeem Zuniga [de-identified] y o  female [id number]    Hospital Stay Days: 2      Assessment/Plan:    Principal Problem:    S/P below knee amputation, left ([facility] [address] )  Active Problems:    Acute kidney injury ([facility] [address] )    Essential hypertension, benign    Left BKA  -management per primary team  -wound VAC in place    Type 2 diabetes  -A1c 6 6% 2018  -patient noted to have 2 episodes of hypoglycemia with blood glucose in the 50s; blood glucose recovered after eating snack  -will decrease Levemir to 20 units daily with correctional insulin coverage  -continue carb controlled diet  -continue monitor    CKD stage 3  -nephrology consulted and following; appreciate recommendations    Disposition:  Continue inpatient management per primary team       Subjective:   Patient seen examined this morning  Patient resting comfortably in no acute distress  No acute events overnight  Denies fever, chills, headaches, lightheadedness, dizziness, visual disturbances, sore throat, chest pain, palpitations, SOB, abdominal pain, nausea, vomiting, or trouble urinating/ defecating  Vitals: Temp (24hrs), Av 5 °F (36 9 °C), Min:98 4 °F (36 9 °C), Max:98 6 °F (37 °C)  Current: Temperature: 98 4 °F (36 9 °C)  Vitals:    18 2156 18 2337 18 0600 18 0703   BP: 103/52 134/60  134/63   Pulse:  (!) 54  61   Resp:  16  18   Temp:  98 5 °F (36 9 °C)  98 4 °F (36 9 °C)   TempSrc:  Oral  Oral   SpO2:  92%  94%   Weight:   67 6 kg (149 lb)    Height:        Body mass index is 25 58 kg/m²  I/O last 24 hours: In: 1929 2 [P O :460; I V :1469 2]  Out: 225 [Urine:225]      Physical Exam   Constitutional: She appears well-developed  No distress  Thin frail appearance   HENT:   Head: Normocephalic and atraumatic     Right Ear: External ear normal    Left Ear: External ear normal    Nose: Nose normal    Eyes: Pupils are equal, round, and reactive to light  Conjunctivae and EOM are normal  Right eye exhibits no discharge  Left eye exhibits no discharge  No scleral icterus  Neck: No tracheal deviation present  Cardiovascular: Normal rate, regular rhythm, normal heart sounds and intact distal pulses  Exam reveals no gallop and no friction rub  No murmur heard  Pulmonary/Chest: Effort normal and breath sounds normal  No stridor  No respiratory distress  She has no wheezes  She has no rales  She exhibits no tenderness  Abdominal: Soft  Bowel sounds are normal  She exhibits no distension  There is no tenderness  There is no rebound and no guarding  Musculoskeletal: She exhibits deformity  She exhibits no edema or tenderness  Status post left BKA  Black eschar noted on right foot   Neurological: She is alert  Oriented x2 (person place)   Skin: Skin is warm and dry  No rash noted  She is not diaphoretic  No erythema  No pallor  Vitals reviewed          Invasive Devices     Peripheral Intravenous Line            Peripheral IV 12/05/18 Left Wrist 1 day    Peripheral IV 12/05/18 Right Arm 1 day    Peripheral IV 12/05/18 Right Forearm 1 day          Drain            Negative Pressure Wound Therapy (V A C ) Knee Left 1 day                          Labs:   Recent Results (from the past 24 hour(s))   Fingerstick Glucose (POCT)    Collection Time: 12/06/18  9:39 AM   Result Value Ref Range    POC Glucose 176 (H) 65 - 140 mg/dl   Fingerstick Glucose (POCT)    Collection Time: 12/06/18 11:11 AM   Result Value Ref Range    POC Glucose 157 (H) 65 - 140 mg/dl   Fingerstick Glucose (POCT)    Collection Time: 12/06/18  4:40 PM   Result Value Ref Range    POC Glucose 112 65 - 140 mg/dl   Fingerstick Glucose (POCT)    Collection Time: 12/06/18  8:55 PM   Result Value Ref Range    POC Glucose 51 (L) 65 - 140 mg/dl   Fingerstick Glucose (POCT)    Collection Time: 12/06/18 10:14 PM   Result Value Ref Range    POC Glucose 103 65 - 140 mg/dl   Comprehensive metabolic panel    Collection Time: 12/07/18  5:05 AM   Result Value Ref Range    Sodium 139 136 - 145 mmol/L    Potassium 3 7 3 5 - 5 3 mmol/L    Chloride 104 100 - 108 mmol/L    CO2 27 21 - 32 mmol/L    ANION GAP 8 4 - 13 mmol/L    BUN 19 5 - 25 mg/dL    Creatinine 0 82 0 60 - 1 30 mg/dL    Glucose 44 (L) 65 - 140 mg/dL    Calcium 8 0 (L) 8 3 - 10 1 mg/dL    AST 53 (H) 5 - 45 U/L    ALT 37 12 - 78 U/L    Alkaline Phosphatase 60 46 - 116 U/L    Total Protein 5 8 (L) 6 4 - 8 2 g/dL    Albumin 2 5 (L) 3 5 - 5 0 g/dL    Total Bilirubin 0 42 0 20 - 1 00 mg/dL    eGFR 68 ml/min/1 73sq m   Magnesium    Collection Time: 12/07/18  5:05 AM   Result Value Ref Range    Magnesium 1 7 1 6 - 2 6 mg/dL   Phosphorus    Collection Time: 12/07/18  5:05 AM   Result Value Ref Range    Phosphorus 4 1 2 3 - 4 1 mg/dL   CBC and differential    Collection Time: 12/07/18  5:05 AM   Result Value Ref Range    WBC 9 58 4 31 - 10 16 Thousand/uL    RBC 4 19 3 81 - 5 12 Million/uL    Hemoglobin 9 8 (L) 11 5 - 15 4 g/dL    Hematocrit 33 4 (L) 34 8 - 46 1 %    MCV 80 (L) 82 - 98 fL    MCH 23 4 (L) 26 8 - 34 3 pg    MCHC 29 3 (L) 31 4 - 37 4 g/dL    RDW 16 2 (H) 11 6 - 15 1 %    MPV 10 6 8 9 - 12 7 fL    Platelets 287 498 - 166 Thousands/uL    nRBC 0 /100 WBCs    Neutrophils Relative 74 43 - 75 %    Immat GRANS % 0 0 - 2 %    Lymphocytes Relative 15 14 - 44 %    Monocytes Relative 9 4 - 12 %    Eosinophils Relative 2 0 - 6 %    Basophils Relative 0 0 - 1 %    Neutrophils Absolute 7 17 1 85 - 7 62 Thousands/µL    Immature Grans Absolute 0 03 0 00 - 0 20 Thousand/uL    Lymphocytes Absolute 1 40 0 60 - 4 47 Thousands/µL    Monocytes Absolute 0 81 0 17 - 1 22 Thousand/µL    Eosinophils Absolute 0 14 0 00 - 0 61 Thousand/µL    Basophils Absolute 0 03 0 00 - 0 10 Thousands/µL   Fingerstick Glucose (POCT)    Collection Time: 12/07/18  6:23 AM   Result Value Ref Range    POC Glucose 51 (L) 65 - 140 mg/dl   Fingerstick Glucose (POCT)    Collection Time: 12/07/18  7:18 AM   Result Value Ref Range    POC Glucose 115 65 - 140 mg/dl       Radiology Results: I have personally reviewed pertinent reports  Other Diagnostic Testing:   I have personally reviewed pertinent reports          Active Meds:   Current Facility-Administered Medications   Medication Dose Route Frequency    acetaminophen (TYLENOL) tablet 650 mg  650 mg Oral Q6H PRN    albuterol (PROVENTIL HFA,VENTOLIN HFA) inhaler 2 puff  2 puff Inhalation BID PRN    amiodarone tablet 200 mg  200 mg Oral BID    amLODIPine (NORVASC) tablet 5 mg  5 mg Oral Q12H    atorvastatin (LIPITOR) tablet 80 mg  80 mg Oral Daily With Dinner    bisacodyl (DULCOLAX) EC tablet 10 mg  10 mg Oral Daily    cholecalciferol (VITAMIN D3) tablet 1,000 Units  1,000 Units Oral Daily    docusate sodium (COLACE) capsule 100 mg  100 mg Oral BID    fentaNYL (DURAGESIC) 12 mcg/hr TD 72 hr patch 1 patch  1 patch Transdermal Q72H    fluticasone (FLONASE) 50 mcg/act nasal spray 1 spray  1 spray Nasal Daily    fluticasone (FLOVENT HFA) 44 mcg/act inhaler 1 puff  1 puff Inhalation BID    gabapentin (NEURONTIN) capsule 600 mg  600 mg Oral BID    HYDROmorphone (DILAUDID) injection 0 5 mg  0 5 mg Intravenous Q3H PRN    [START ON 12/8/2018] insulin detemir (LEVEMIR) subcutaneous injection 20 Units  20 Units Subcutaneous Early Morning    insulin lispro (HumaLOG) 100 units/mL subcutaneous injection 1-5 Units  1-5 Units Subcutaneous TID AC    insulin lispro (HumaLOG) 100 units/mL subcutaneous injection 1-5 Units  1-5 Units Subcutaneous HS    levETIRAcetam (KEPPRA) tablet 750 mg  750 mg Oral Daily    levothyroxine tablet 100 mcg  100 mcg Oral Early Morning    methocarbamol (ROBAXIN) tablet 500 mg  500 mg Oral Q6H PRN    multi-electrolyte (ISOLYTE-S PH 7 4 equivalent) IV solution  75 mL/hr Intravenous Continuous    naloxone (NARCAN) 0 04 mg/mL syringe 0 04 mg  0 04 mg Intravenous PRN    oxyCODONE (ROXICODONE) IR tablet 10 mg  10 mg Oral Q4H PRN    oxyCODONE (ROXICODONE) IR tablet 5 mg  5 mg Oral Q4H PRN    pantoprazole (PROTONIX) EC tablet 40 mg  40 mg Oral Early Morning    rivaroxaban (XARELTO) tablet 20 mg  20 mg Oral Daily         VTE Pharmacologic Prophylaxis:  Xarelto  VTE Mechanical Prophylaxis: sequential compression device    Aleyda Alvarenga, DO

## 2018-12-07 NOTE — PROGRESS NOTES
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    S/P Left BKA revision, washout and VAC placement    Plan:  --Continue local wound care, VAC change today  --Arrange outpatient wound VAC therapy  --Pain control           Acute kidney injury ([facility] [address] )   Assessment & Plan    Creatinine improved to 0 82  Low urine output overnight w/ retention requiring straight cath x 2    Plan:  --Nephrology following   --IVF per nephrology  --Void trial/bladder scan, retention protocol           Subjective:  Pt c/o pain at incision/VAC site on exam    Vitals:  /60   Pulse (!) 54   Temp 98 5 °F (36 9 °C) (Oral)   Resp 16   Ht 5' 4" (1 626 m)   Wt 63 5 kg (140 lb)   LMP  (LMP Unknown)   SpO2 92%   BMI 24 03 kg/m²     I/Os:  I/O last 3 completed shifts: In: 3086 9 [P O :1060; I V :2026 9]  Out: 575 [Urine:575]  I/O this shift:  In: 649 2 [I V :649 2]  Out: 225 [Urine:225]    Lab Results and Cultures:   Lab Results   Component Value Date    WBC 9 58 12/07/2018    HGB 9 8 (L) 12/07/2018    HCT 33 4 (L) 12/07/2018    MCV 80 (L) 12/07/2018     12/07/2018     Lab Results   Component Value Date    GLUCOSE 104 09/12/2018    CALCIUM 8 0 (L) 12/07/2018     10/14/2015    K 3 7 12/07/2018    CO2 27 12/07/2018     12/07/2018    BUN 19 12/07/2018    CREATININE 0 82 12/07/2018     Lab Results   Component Value Date    INR 1 28 (H) 09/18/2018    INR 1 52 (H) 09/09/2018    INR 1 77 (H) 09/08/2018    PROTIME 16 1 (H) 09/18/2018    PROTIME 18 4 (H) 09/09/2018    PROTIME 20 7 (H) 09/08/2018        Blood Culture:   Lab Results   Component Value Date    BLOODCX No Growth After 5 Days   08/08/2018    BLOODCX No Growth After 5 Days   08/08/2018   ,   Urinalysis:   Lab Results   Component Value Date    COLORU yellow 06/12/2018    COLORU Yellow 05/18/2018    COLORU Yellow 12/21/2016    CLARITYU cloudy 06/12/2018    CLARITYU Clear 05/18/2018    CLARITYU Transparent 12/21/2016    SPECGRAV 1 010 05/18/2018    SPECGRAV 1 010 12/21/2016    PHUR 5 0 05/18/2018    PHUR 8 12/21/2016    LEUKOCYTESUR moderate 06/12/2018    LEUKOCYTESUR Small (A) 05/18/2018    LEUKOCYTESUR - 12/21/2016    NITRITE neg 06/12/2018    NITRITE Negative 05/18/2018    NITRITE - 12/21/2016    PROTEINUA - 12/21/2016    GLUCOSEU neg 06/12/2018    GLUCOSEU Negative 05/18/2018    GLUCOSEU Negative 05/28/2015    KETONESU neg 06/12/2018    KETONESU Negative 05/18/2018    KETONESU - 12/21/2016    BILIRUBINUR neg 06/12/2018    BILIRUBINUR Negative 05/18/2018    BILIRUBINUR Negative 05/28/2015    BLOODU small 06/12/2018    BLOODU Negative 05/18/2018    BLOODU - 12/21/2016   ,   Urine Culture:   Lab Results   Component Value Date    URINECX >100,000 cfu/ml Proteus mirabilis (A) 06/12/2018   ,   Wound Culure:   Lab Results   Component Value Date    WOUNDCULT 4+ Growth of Pseudomonas aeruginosa (A) 09/11/2018    WOUNDCULT 4+ Growth of Klebsiella oxytoca (A) 09/11/2018    WOUNDCULT 4+ Growth of Staphylococcus aureus (A) 09/11/2018       Medications:  Current Facility-Administered Medications   Medication Dose Route Frequency    acetaminophen (TYLENOL) tablet 650 mg  650 mg Oral Q6H PRN    albuterol (PROVENTIL HFA,VENTOLIN HFA) inhaler 2 puff  2 puff Inhalation BID PRN    amiodarone tablet 200 mg  200 mg Oral BID    amLODIPine (NORVASC) tablet 5 mg  5 mg Oral Q12H    atorvastatin (LIPITOR) tablet 80 mg  80 mg Oral Daily With Dinner    bisacodyl (DULCOLAX) EC tablet 10 mg  10 mg Oral Daily    cholecalciferol (VITAMIN D3) tablet 1,000 Units  1,000 Units Oral Daily    docusate sodium (COLACE) capsule 100 mg  100 mg Oral BID    fentaNYL (DURAGESIC) 12 mcg/hr TD 72 hr patch 1 patch  1 patch Transdermal Q72H    fluticasone (FLONASE) 50 mcg/act nasal spray 1 spray  1 spray Nasal Daily    fluticasone (FLOVENT HFA) 44 mcg/act inhaler 1 puff  1 puff Inhalation BID    gabapentin (NEURONTIN) capsule 600 mg  600 mg Oral BID    HYDROmorphone (DILAUDID) injection 0 5 mg  0 5 mg Intravenous Q3H PRN    insulin detemir (LEVEMIR) subcutaneous injection 24 Units  24 Units Subcutaneous Early Morning    insulin lispro (HumaLOG) 100 units/mL subcutaneous injection 1-5 Units  1-5 Units Subcutaneous TID AC    insulin lispro (HumaLOG) 100 units/mL subcutaneous injection 1-5 Units  1-5 Units Subcutaneous HS    levETIRAcetam (KEPPRA) tablet 750 mg  750 mg Oral Daily    levothyroxine tablet 100 mcg  100 mcg Oral Early Morning    methocarbamol (ROBAXIN) tablet 500 mg  500 mg Oral Q6H PRN    multi-electrolyte (ISOLYTE-S PH 7 4 equivalent) IV solution  75 mL/hr Intravenous Continuous    naloxone (NARCAN) 0 04 mg/mL syringe 0 04 mg  0 04 mg Intravenous PRN    oxyCODONE (ROXICODONE) IR tablet 10 mg  10 mg Oral Q4H PRN    oxyCODONE (ROXICODONE) IR tablet 5 mg  5 mg Oral Q4H PRN    pantoprazole (PROTONIX) EC tablet 40 mg  40 mg Oral Early Morning    rivaroxaban (XARELTO) tablet 20 mg  20 mg Oral Daily       Physical Exam:    General appearance: alert and oriented, in no acute distress  Lungs: clear to auscultation bilaterally  Heart: regular rate and rhythm, S1, S2 normal, no murmur, click, rub or gallop  Abdomen: soft, non-tender; bowel sounds normal; no masses,  no organomegaly  Extremities: Left BKA stump VAC intact      Wound/Incision:  No erythema or edema L BKA       Yamila Jose PA-C  12/7/2018

## 2018-12-07 NOTE — ASSESSMENT & PLAN NOTE
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

## 2018-12-07 NOTE — PROGRESS NOTES
Spoke to Dr Vilma Cho with renal regarding patient's urine output over the last 24 hours (225 ml obtained via straight cath)  Order to increase Isolyte to 75 ml/hr  Jean Pierre of vascular surgery also notified of increase  Patient DTV at 4-6 AM  Will continue to monitor her output

## 2018-12-07 NOTE — PROGRESS NOTES
VAC change  12/7/2018      Drains:   Unit Type     V  A C ulta       Black foam- # applied      White foam-# applied 4  0   Black foam- # removed  White foam-# removed 3  0   Cycle     Continuous       Target Pressure (mmHg)     125       Dressing Status     Clean;Dry; Intact           Wound Description:   Medial 3x6x0 5cm  Lateral 3x2x0 5cm

## 2018-12-07 NOTE — PLAN OF CARE
Problem: OCCUPATIONAL THERAPY ADULT  Goal: Performs self-care activities at highest level of function for planned discharge setting  See evaluation for individualized goals  Treatment Interventions: ADL retraining, Functional transfer training, UE strengthening/ROM, Endurance training, Cognitive reorientation, Patient/family training, Compensatory technique education, Continued evaluation, Activityengagement          See flowsheet documentation for full assessment, interventions and recommendations  Limitation: Decreased ADL status, Decreased UE ROM, Decreased UE strength, Decreased Safe judgement during ADL, Decreased cognition, Decreased endurance, Decreased self-care trans, Decreased high-level ADLs  Prognosis: Poor  Assessment: Pt is a [de-identified] y o  female who was admitted to [facility] on 12/5/2018 with S/P below knee amputation, left ([address] ) revision 13/3/87 with application of wound vac  Pt's problem list also includes PMH of HTN,   At baseline pt was completing ADL's with assistance (continue to clarify baseline ADL/transfer level)  Pt is currently residing at [facility] per case management was at [facility] for therapy [facility] admission in 9/18 and then discharged to [facility] for further care/rehab  Currently pt requires max /total assist for overall ADLS and assist x 2 bed transfers, continue to clarify WBAT to RLE   Pt currently presents with impairments in the following categories -behavioral pattern, difficulty performing ADLS, difficulty performing IADLS , limited insight into deficits, compliance, flat affect and decreased initiation and engagement  activity tolerance, endurance, standing balance/tolerance, sitting balance/tolerance, UE strength, UE ROM, memory, insight, safety , judgement , attention , sequencing , task initiation , task termination  and communication   These impairments, as well as pt's fatigue, pain and risk for falls  limit pt's ability to safely engage in all baseline areas of occupation, includingeating, grooming, bathing, dressing, toileting, functional mobility/transfers, community mobility and leisure activities  From OT standpoint, recommend inpatient rehab  upon D/C  OT will continue to follow to address the below stated goals  OT Discharge Recommendation: Short Term Rehab  OT - OK to Discharge:  Yes

## 2018-12-07 NOTE — PROGRESS NOTES
Patient's blood glucose is 51 ml   Given 1/2 amp of dextrose per protocol, and will recheck sugar at 10 pm

## 2018-12-07 NOTE — PROGRESS NOTES
AM blood sugar was 51 and patient said "yes" when asked if she could tell her blood sugar was low and she says she feels weak  1/2 an amp was given and patient was given a snack to eat  Will recheck blood sugar at [address] of [address] [address] [facility] notified

## 2018-12-07 NOTE — PROGRESS NOTES
Per urinary retention protocol, patient is now over the 300 ml limit and should be straight cathd  Straight cath'd for 225 ml of dark urine  Also found that patient had a cup of meds in her med box that were not administered today  It appears to be her AM medications  Vascular resident Roberto Dickson notified of above   RN ordered to administer missed Xarelto dose now, as patient did not receive this AM

## 2018-12-07 NOTE — ASSESSMENT & PLAN NOTE
Creatinine improved to 0 82  Low urine output overnight w/ retention requiring straight cath x 2    Plan:  --Nephrology following   --IVF per nephrology  --Void trial/bladder scan, retention protocol

## 2018-12-07 NOTE — OCCUPATIONAL THERAPY NOTE
[address] Evaluation     Patient Name: Carlos Eduardo Davis Date: 12/7/2018  Problem List  Patient Active Problem List   Diagnosis    Essential hypertension    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 native heart without angina pectoris    Type 2 diabetes mellitus with complication, with long-term current use of insulin ([facility])    Atrial flutter (HCC)    Hyperlipidemia    Gastroesophageal reflux disease without esophagitis    Moderate mitral stenosis    Acute kidney injury ([address] )    Arthritis of hand    Asthma    Asymptomatic carotid artery stenosis, bilateral    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 ([address] )    Hx of CABG    Chronic anticoagulation    Chronic combined systolic and diastolic congestive heart failure ([facility])    Degenerative joint disease involving multiple joints    Diabetic retinopathy ([facility])    Postoperative hypothyroidism    Migraine headache    Nephrolithiasis    Osteoarthritis of both knees    Paroxysmal atrial fibrillation ([facility])    Ulcer of toe of left foot (HCC)    Ulcer of toe of right foot ([facility])    Prolonged Q-T interval on ECG    Hypokalemia    Hypocalcemia    Left bundle branch block (LBBB)    1st degree AV block    S/P TAVR (transcatheter aortic valve replacement)    Expressive aphasia    Multiple lacunar infarcts    Vomiting    History of CVA (cerebrovascular accident)    Seizure ([address] )    NSVT (nonsustained ventricular tachycardia) ([facility])    Pulmonary hypertension (HCC)    Anemia    Lactic acidosis    Hypomagnesemia    Bradycardia    Foot pain    PAD (peripheral artery disease) ([facility])    Cellulitis    CKD (chronic kidney disease) stage 3, GFR 30-59 ml/min ([facility])    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 ([facility])    Elevated INR    Hypothyroidism    Rotator cuff disorder, left    Wet gangrene ([address] )    Decubitus ulcer of sacral region, stage 3 ([facility])    Urinary retention    S/P BKA (below knee amputation) unilateral, left (HCC)    S/P below knee amputation, left (HCC)    Essential hypertension, benign     Past Medical History  Past Medical History:   Diagnosis Date    Altered mental status     Anemia     Anticoagulated     Xarelto for Afib/ flutter    Asthma     Atrial flutter (HCC)     maintained on anticoag w/ Xarelto    Bradycardia     CAD (coronary artery disease)     Candidiasis     Carotid stenosis, bilateral     Cataract     Chest pain     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) (HCC)     Chronic fatigue syndrome     Chronic low back pain     Chronic ulcer of toes (HCC)     left and right    Concussion w loss of consciousness of unsp duration, init     CVA (cerebral vascular accident) ([facility] ) 4/17/2018    Diabetes mellitus ([facility] )     type 2, insulin dependent    DJD (degenerative joint disease)     Expressive aphasia     Foot pain     GERD (gastroesophageal reflux disease)     Herpes zoster     HLD (hyperlipidemia)     HTN (hypertension)     Hypothyroidism     Irritable bowel syndrome     Kidney stone     Lumbar radiculopathy     Lyme disease     Dx in hospital 8/2011    MI (myocardial infarction) ([facility]     Migraines     Muscle spasm     Non-neoplastic nevus     Nontoxic multinodular goiter     NSVT (nonsustained ventricular tachycardia) (HCC)     Osteoarthritis     Other chronic pain     PAD (peripheral artery disease) (HCC)     Palpitations     Paroxysmal atrial fibrillation (HCC)     Pseudogout     Pulmonary hypertension (HCC)     S/P TAVR (transcatheter aortic valve replacement)     Seizures (HCC)     Severe sepsis (HCC)     Spinal stenosis     Stroke ([facility] ) 05/2018    Transient cerebral ischemia     Trigger ring finger     Viral gastroenteritis      Past Surgical History  Past Surgical History:   Procedure Laterality Date    APPENDECTOMY      ARTERIOGRAM  12/19/2017   [facility] CARDIAC CATHETERIZATION      CHOLECYSTECTOMY      COLONOSCOPY      CORONARY ARTERY BYPASS GRAFT  2004    IR ABDOMINAL ANGIOGRAPHY / INTERVENTION  8/10/2018    IR ABDOMINAL ANGIOGRAPHY / INTERVENTION  8/21/2018    LEG AMPUTATION THROUGH LOWER TIBIA AND FIBULA Left 9/17/2018    Procedure: AMPUTATION BELOW KNEE (BKA); Surgeon: Libby Leon MD;  [facility];  Service: Vascular    LUMBAR LAMINECTOMY      WA ECHO TRANSESOPHAG R-T 2D W/PRB IMG ACQUISJ I&R N/A 4/3/2018    Procedure: TRANSESOPHAGEAL ECHOCARDIOGRAM (ROBB); Surgeon: Silvana Bauer DO;  [facility];  Service: Cardiac Surgery    WA RE-AMPUTATION LOWER LEG Left 12/5/2018    Procedure: Revision of Left AMPUTATION BELOW KNEE (BKA); Surgeon: Libby Leon MD;  [facility];  Service: Vascular    WA REPLACE AORTIC VALVE OPENFEMORAL ARTERY APPROACH N/A 4/3/2018    Procedure: REPLACEMENT AORTIC VALVE TRANSCATHETER (TAVR) TRANSFEMORAL w/ 26MM IVY YEVGENIY S3 VALVE;  Surgeon: Silvana Bauer DO;  [facility];  Service: Cardiac Surgery    WA VEIN BYPASS GRAFT,TIB-PERONEAL Left 9/12/2018    Procedure: Exploration of posterior tibial, medial, and lateral plantar arteries  ;  Surgeon: Benjy Bolivar MD;  [facility];  Service: Vascular    THYROIDECTOMY      TOTAL ABDOMINAL HYSTERECTOMY W/ BILATERAL SALPINGOOPHORECTOMY      VAC DRESSING APPLICATION  23/1/4987    Procedure: APPLICATION VAC DRESSING;  Surgeon: Libby Leon MD;  [facility];  Service: Vascular      12/07/18 1424   Note Type   Note type Eval only   Restrictions/Precautions   Weight Bearing Precautions Per Order (last hospital admission WBAT/with Darco shoe to RLE-continue to clarify)   Other Precautions Multiple lines;Telemetry; Fall Risk;Pain;Bed Alarm; Chair Alarm;Cognitive  (wound vac to L BKA)   Pain Assessment   Pain Assessment Obando-Baker FACES   Obando-Baker FACES Pain Rating 4   Pain Type Acute pain   Pain Location Leg; Foot   Pain Orientation Right   Hospital Pain Intervention(s) Repositioned; Rest   Home Living   Type of Home SNF   Home Layout One level   Bathroom Shower/Tub Walk-in shower   Bathroom Toilet Raised   Bathroom Equipment Grab bars in shower; Shower chair   Bathroom Accessibility Accessible via wheelchair;Accessible via walker   Additional Comments pt is a poor historian, unclear full social history   Prior Function   Level of [place] Needs assistance with ADLs and functional mobility; Needs assistance with IADLs   Lives With Facility staff   Receives Help From Family;Personal care attendant  (+son)   ADL Assistance Needs assistance   IADLs Needs assistance   Falls in the last 6 months 1 to 4   Vocational Retired   Comments pt poor historian, need to clarify assistance with ADL's, transfer status   Lifestyle   Autonomy Assistance required for ADL's/IADL's and transfers to w/c from facility   Reciprocal Relationships facility, family   Service to Others retired   [employer]   Psychosocial   Psychosocial (WDL) [address] pt received bedside, pleasantly confused and stating "my children are in their 19's"   ADL   Where Assessed Supine, bed   Eating Assistance 2  Maximal Assistance   Eating Deficit Setup;Verbal cueing; Increased time to complete  (Per nsg-pt able to feed self finger foods minimally)   Grooming Assistance 2  Maximal Assistance   Grooming Deficit Setup;Verbal cueing;Supervision/safety; Increased time to complete  (hand over hand assistance to comb hair, perseverated )   UB Bathing Assistance 2  Maximal Assistance   UB Bathing Deficit Setup;Verbal cueing;Supervision/safety; Increased time to complete   LB Bathing Assistance 2  Maximal Assistance   LB Bathing Deficit Setup;Verbal cueing;Supervision/safety; Increased time to complete   UB Dressing Assistance 1  Total Assistance   LB Dressing Assistance 1  Total Assistance   LB Dressing Deficit Don/doff R sock   Toileting Assistance  1 Total Assistance   Additional Comments pt difficulty peforming self care tasks due to confusion/ impaired cognitive status -with decreased task initation, attention to sequence task and impaired motor planning    Bed Mobility   Rolling R 3  Moderate assistance   Additional items Assist x 1;Bedrails; Increased time required;Verbal cues  (verbal cues initiation)   Rolling L 1  Dependent   Additional items Assist x 1  (difficulty with LUE strength to assist using bedrail)   Supine to Sit (supine to sit at bed level with max a x 1)   Additional items Assist x 1;Verbal cues  (will require assist of 2 supine to sit at edge of bed,)   Additional Comments poor sitting balance with max a to sustain sitting upright without back support in bed  Transfers   Sit to Stand Unable to assess  (clarify WBAT to RLE-pt unable to tolerate OOB transfers )   Balance   Static Sitting Poor   Dynamic Sitting Poor -   Activity Tolerance   Activity Tolerance Patient limited by fatigue;Patient limited by pain; Other (Comment)  (confusion/impaired cognitive status)   Medical Staff Made Aware +discussed pt discharge plan with case managment -was receiving therapy at SNF facility   Nurse Made Aware RN cleared pt for therapy, PCT assisted   RUE Assessment   RUE Assessment WFL   LUE Assessment   LUE Assessment X   LUE Strength   L Shoulder Flexion 3/5   L Shoulder ABduction 3/5   Hand Function   Gross Motor Coordination Impaired   Fine Motor Coordination Impaired   Sensation   Light Touch No apparent deficits   Vision - Complex Assessment   Acuity Able to read employee name badge without difficulty   Perception   Motor Planning Hand over hand to sequence tasks   Perseveration Perseverates during ADLs   Cognition   Overall Cognitive Status Impaired   Arousal/Participation Alert; Cooperative   Attention Difficulty attending to directions   Orientation Level Oriented to person;Oriented to place; Disoriented to time;Disoriented to situation  (unable to state place currently resides )   Memory Decreased recall of biographical information;Decreased recall of recent events;Decreased recall of precautions;Decreased short term memory;Decreased long term memory   Following Commands Follows one step commands with increased time or repetition   Comments pt +decreased initiation with tasks, poor historian   (Continue to clarify baseline ADL status)   Assessment   Limitation Decreased ADL status; Decreased UE ROM; Decreased UE strength;Decreased Safe judgement during ADL;Decreased cognition;Decreased endurance;Decreased self-care trans;Decreased high-level ADLs   Prognosis Poor   Assessment Pt is a [de-identified] y o  female who was admitted to [facility] on 12/5/2018 with S/P below knee amputation, left ([address] ) revision 97/4/76 with application of wound vac  Pt's problem list also includes PMH of HTN,   At baseline pt was completing ADL's with assistance (continue to clarify baseline ADL/transfer level)  Pt is currently residing at [facility] per case management was at [facility] for therapy post B admission in 9/18 and then discharged to [facility] for further care/rehab  Currently pt requires max /total assist for overall ADLS and assist x 2 bed transfers, continue to clarify WBAT to RLE   Pt currently presents with impairments in the following categories -behavioral pattern, difficulty performing ADLS, difficulty performing IADLS , limited insight into deficits, compliance, flat affect and decreased initiation and engagement  activity tolerance, endurance, standing balance/tolerance, sitting balance/tolerance, UE strength, UE ROM, memory, insight, safety , judgement , attention , sequencing , task initiation , task termination  and communication   These impairments, as well as pt's fatigue, pain and risk for falls/cognitive deficits  limit pt's ability to safely engage in all baseline areas of occupation, includingeating, grooming, bathing, dressing, toileting, functional mobility/transfers, community mobility and leisure activities  From OT standpoint, recommend inpatient rehab  upon D/C  OT will continue to follow to address the below stated goals  Goals   Patient Goals no goals stated due to cognitive deficits   LTG Time Frame 10-14   Long Term Goal #1 see goals below   Plan   Treatment Interventions ADL retraining;Functional transfer training;UE strengthening/ROM; Endurance training;Cognitive reorientation;Patient/family training; Compensatory technique education;Continued evaluation; Activityengagement   Goal Expiration Date 12/21/18   OT Frequency 1-2x/wk   Recommendation   OT Discharge Recommendation Short Term Rehab   OT - OK to Discharge Yes   Barthel Index   Feeding 0   Bathing 0   Grooming Score 0   Dressing Score 0   Bladder Score 5   Bowels Score 5   Toilet Use Score 0   Transfers (Bed/Chair) Score 5   Mobility (Level Surface) Score 0   Stairs Score 0   Barthel Index Score 15   Modified [place] Scale   Modified [place] Scale 5        *Mod a x 1 for bed mobility -supine<>sit    *Assess transfers as pt is able and clarify WBAT to RLE       *Mod a simple grooming tasks  *Mod a self feeding tasks, with min verbal cues for initiation  *Edge of bed sitting 5 minutes with min a to prepare for transfers       Tania Gregg, OT

## 2018-12-07 NOTE — PROGRESS NOTES
Patient DTV according to urinary retention protocol  Bladder scan showed 222mL of retained urine  Berenice Smith Sx notified  As per [facility], will give pt until 8am and recheck bladder scan

## 2018-12-07 NOTE — PROGRESS NOTES
Progress Note - Nephrology   Nickolas Flowers [de-identified] y o  female MRN: [id number]  Unit/Bed#: [facility] 506-01 Encounter: [id number]      Assessment / Plan:    1  LEONARD - secondary to outpatient Ace inhibition, volume depletion, relative hypotension and urinary retention  · Baseline creatinine usually less than 1  Most recent outpatient creatinine was 0 78 on  and 0 61 on 10/30  There was no admission creatinine but assuming LEONARD present on admission  · Continue intravenous fluids at a lower rate  · Hold Ace inhibition  · Continue urinary retention protocol - last  mL  · Norvasc dose was split and hold parameter was changed to hold for systolic blood pressure less than 130 to avoid relative hypotension with subsequent decrease in renal blood flow  · AST is slightly elevated, repeat improved  Trend LFTs for now  Elevated LFTs are potent stimulus for LEONARD  If LFTs increased further, would hold statin  · Avoid nephrotoxins  · Recheck BMP in a m  2  Hypertension  · Strive keep the systolic blood pressure 779 to 145 given advanced age in acute kidney injury        Subjective: The patient was seen examined earlier today  She was awake but somewhat distant at times and slow to respond  Her glucose had been decreased earlier and she received intravenous dextrose  Reviewed her course with her bedside nurse today  Objective:     Vitals: Blood pressure 134/63, pulse 61, temperature 98 4 °F (36 9 °C), temperature source Oral, resp  rate 18, height 5' 4" (1 626 m), weight 67 6 kg (149 lb), SpO2 94 %, not currently breastfeeding  ,Body mass index is 25 58 kg/m²  Temp (24hrs), Av 5 °F (36 9 °C), Min:98 4 °F (36 9 °C), Max:98 6 °F (37 °C)      Weight (last 2 days)     Date/Time   Weight    18 0600  67 6 (149)    18 1222  63 5 (140)                     Intake/Output Summary (Last 24 hours) at 18 1255  Last data filed at 18 1052   Gross per 24 hour   Intake          1811 67 ml   Output 575 ml   Net          1236 67 ml     I/O last 24 hours: In: 2751 7 [P O :560; I V :2191 7]  Out: 575 [Urine:575]        Physical Exam    Physical Exam   Constitutional: No distress  Neck: No JVD present  Cardiovascular: Normal heart sounds  Exam reveals no gallop and no friction rub  Pulmonary/Chest: Effort normal and breath sounds normal  No respiratory distress  She has no wheezes  She has no rales  Abdominal: Soft  Bowel sounds are normal  She exhibits no distension  There is no tenderness  There is no rebound and no guarding  Musculoskeletal: She exhibits edema (Right lower extremity was 0 to trace edema, gangrenous changes of her foot area noted)  Skin: She is not diaphoretic  Vitals reviewed                Invasive Devices     Peripheral Intravenous Line            Peripheral IV 12/05/18 Right Arm 2 days    Peripheral IV 12/05/18 Left Wrist 1 day    Peripheral IV 12/05/18 Right Forearm 1 day          Drain            Negative Pressure Wound Therapy (V A C ) Knee Left 1 day                Medications:    Scheduled Meds:  Current Facility-Administered Medications:  acetaminophen 650 mg Oral Q6H PRN Zayra Cannon PA-C    albuterol 2 puff Inhalation BID PRN Skyler Robbins MD    amiodarone 200 mg Oral BID Skyler Robbins MD    amLODIPine 5 mg Oral Q12H Germaine Gaines MD    atorvastatin 80 mg Oral Daily With Judit Cabrales MD    bisacodyl 10 mg Oral Daily Skyler Robbins MD    cholecalciferol 1,000 Units Oral Daily Zayra Cannon PA-C    docusate sodium 100 mg Oral BID Skyler Robbins MD    fentaNYL 1 patch Transdermal Q72H Skyler Robbins MD    fluticasone 1 spray Nasal Daily Zayra Cannon PA-C    fluticasone 1 puff Inhalation BID Skyler Robbins MD    gabapentin 600 mg Oral BID Skyler Robbins MD    HYDROmorphone 0 5 mg Intravenous Q3H PRN Skyler Robbins MD    [START ON 12/8/2018] insulin detemir 20 Units Subcutaneous Early Morning Claudia Joseph DO    insulin lispro 1-5 Units Subcutaneous TID AC Zayra Cannon PA-C    insulin lispro 1-5 Units Subcutaneous HS Zayra Cannon PA-C    levETIRAcetam 750 mg Oral Daily Andrew Cohen MD    levothyroxine 100 mcg Oral Early Morning Andrew Cohen MD    methocarbamol 500 mg Oral Q6H PRN Andrew Cohen MD    multi-electrolyte 75 mL/hr Intravenous Continuous Bella Shook MD Last Rate: 75 mL/hr (12/07/18 0207)   naloxone 0 04 mg Intravenous PRN Andrew Cohen MD    oxyCODONE 10 mg Oral Q4H PRN Andrew Cohen MD    oxyCODONE 5 mg Oral Q4H PRN Andrew Cohen MD    pantoprazole 40 mg Oral Early Morning Zayra Cannon PA-C    rivaroxaban 20 mg Oral Daily Zayra Cannon PA-C        PRN Meds:   acetaminophen    albuterol    HYDROmorphone    methocarbamol    naloxone    oxyCODONE    oxyCODONE    Continuous Infusions:  multi-electrolyte 75 mL/hr Last Rate: 75 mL/hr (12/07/18 0207)           LAB RESULTS:        Results from last 7 days  Lab Units 12/07/18  0505 12/06/18  0454 12/05/18  1607   WBC Thousand/uL 9 58 10 43*  --    HEMOGLOBIN g/dL 9 8* 9 9*  --    HEMATOCRIT % 33 4* 33 7*  --    PLATELETS Thousands/uL 217 241 234   NEUTROS PCT % 74 69  --    LYMPHS PCT % 15 20  --    MONOS PCT % 9 8  --    EOS PCT % 2 2  --    POTASSIUM mmol/L 3 7 3 8  --    CHLORIDE mmol/L 104 101  --    CO2 mmol/L 27 28  --    BUN mg/dL 19 17  --    CREATININE mg/dL 0 82 1 16  --    CALCIUM mg/dL 8 0* 7 9*  --    ALK PHOS U/L 60 59  --    ALT U/L 37 53  --    AST U/L 53* 57*  --    MAGNESIUM mg/dL 1 7  --   --    PHOSPHORUS mg/dL 4 1  --   --        CUTURES:  Lab Results   Component Value Date    BLOODCX No Growth After 5 Days  08/08/2018    BLOODCX No Growth After 5 Days  08/08/2018    BLOODCX No Growth After 5 Days  05/01/2018    BLOODCX No Growth After 5 Days   05/01/2018    URINECX >100,000 cfu/ml Proteus mirabilis (A) 06/12/2018                 Portions of the record may have been created with voice recognition software  Occasional wrong word or "sound a like" substitutions may have occurred due to the inherent limitations of voice recognition software  Read the chart carefully and recognize, using context, where substitutions have occurred  If you have any questions, please contact the dictating provider

## 2018-12-08 LAB
ANION GAP SERPL CALCULATED.3IONS-SCNC: 5 MMOL/L (ref 4–13)
BUN SERPL-MCNC: 18 MG/DL (ref 5–25)
CALCIUM SERPL-MCNC: 8.9 MG/DL (ref 8.3–10.1)
CHLORIDE SERPL-SCNC: 101 MMOL/L (ref 100–108)
CO2 SERPL-SCNC: 28 MMOL/L (ref 21–32)
CREAT SERPL-MCNC: 0.64 MG/DL (ref 0.6–1.3)
GFR SERPL CREATININE-BSD FRML MDRD: 85 ML/MIN/1.73SQ M
GLUCOSE SERPL-MCNC: 105 MG/DL (ref 65–140)
GLUCOSE SERPL-MCNC: 107 MG/DL (ref 65–140)
GLUCOSE SERPL-MCNC: 113 MG/DL (ref 65–140)
GLUCOSE SERPL-MCNC: 129 MG/DL (ref 65–140)
GLUCOSE SERPL-MCNC: 167 MG/DL (ref 65–140)
POTASSIUM SERPL-SCNC: 3.9 MMOL/L (ref 3.5–5.3)
SODIUM SERPL-SCNC: 134 MMOL/L (ref 136–145)

## 2018-12-08 PROCEDURE — 80048 BASIC METABOLIC PNL TOTAL CA: CPT | Performed by: INTERNAL MEDICINE

## 2018-12-08 PROCEDURE — G8979 MOBILITY GOAL STATUS: HCPCS

## 2018-12-08 PROCEDURE — G8978 MOBILITY CURRENT STATUS: HCPCS

## 2018-12-08 PROCEDURE — 82948 REAGENT STRIP/BLOOD GLUCOSE: CPT

## 2018-12-08 PROCEDURE — 97162 PT EVAL MOD COMPLEX 30 MIN: CPT

## 2018-12-08 PROCEDURE — 99232 SBSQ HOSP IP/OBS MODERATE 35: CPT | Performed by: INTERNAL MEDICINE

## 2018-12-08 RX ORDER — POTASSIUM CHLORIDE 20 MEQ/1
20 TABLET, EXTENDED RELEASE ORAL ONCE
Status: COMPLETED | OUTPATIENT
Start: 2018-12-08 | End: 2018-12-08

## 2018-12-08 RX ORDER — GABAPENTIN 300 MG/1
300 CAPSULE ORAL 2 TIMES DAILY
Status: DISCONTINUED | OUTPATIENT
Start: 2018-12-08 | End: 2018-12-10 | Stop reason: HOSPADM

## 2018-12-08 RX ORDER — ESCITALOPRAM OXALATE 5 MG/1
5 TABLET ORAL
Status: DISCONTINUED | OUTPATIENT
Start: 2018-12-08 | End: 2018-12-10 | Stop reason: HOSPADM

## 2018-12-08 RX ADMIN — ATORVASTATIN CALCIUM 80 MG: 80 TABLET, FILM COATED ORAL at 17:14

## 2018-12-08 RX ADMIN — GABAPENTIN 600 MG: 300 CAPSULE ORAL at 09:05

## 2018-12-08 RX ADMIN — OXYCODONE HYDROCHLORIDE 5 MG: 5 TABLET ORAL at 19:07

## 2018-12-08 RX ADMIN — GABAPENTIN 300 MG: 300 CAPSULE ORAL at 17:15

## 2018-12-08 RX ADMIN — INSULIN DETEMIR 20 UNITS: 100 INJECTION, SOLUTION SUBCUTANEOUS at 09:05

## 2018-12-08 RX ADMIN — AMIODARONE HYDROCHLORIDE 200 MG: 200 TABLET ORAL at 09:05

## 2018-12-08 RX ADMIN — INSULIN LISPRO 1 UNITS: 100 INJECTION, SOLUTION INTRAVENOUS; SUBCUTANEOUS at 11:41

## 2018-12-08 RX ADMIN — AMIODARONE HYDROCHLORIDE 200 MG: 200 TABLET ORAL at 17:14

## 2018-12-08 RX ADMIN — FENTANYL 1 PATCH: 12.5 PATCH TRANSDERMAL at 17:16

## 2018-12-08 RX ADMIN — OXYCODONE HYDROCHLORIDE 5 MG: 5 TABLET ORAL at 09:05

## 2018-12-08 RX ADMIN — LEVETIRACETAM 750 MG: 750 TABLET ORAL at 09:05

## 2018-12-08 RX ADMIN — ESCITALOPRAM OXALATE 5 MG: 5 TABLET, FILM COATED ORAL at 21:09

## 2018-12-08 RX ADMIN — POTASSIUM CHLORIDE 20 MEQ: 1500 TABLET, EXTENDED RELEASE ORAL at 09:05

## 2018-12-08 RX ADMIN — AMLODIPINE BESYLATE 5 MG: 5 TABLET ORAL at 09:05

## 2018-12-08 RX ADMIN — VITAMIN D, TAB 1000IU (100/BT) 1000 UNITS: 25 TAB at 09:05

## 2018-12-08 RX ADMIN — AMLODIPINE BESYLATE 5 MG: 5 TABLET ORAL at 21:09

## 2018-12-08 RX ADMIN — RIVAROXABAN 20 MG: 20 TABLET, FILM COATED ORAL at 09:05

## 2018-12-08 RX ADMIN — FLUTICASONE PROPIONATE 1 PUFF: 44 AEROSOL, METERED RESPIRATORY (INHALATION) at 21:00

## 2018-12-08 NOTE — PROGRESS NOTES
Left BKA  -management per primary team  -wound VAC in place     Type 2 diabetes  -A1c 6 6% 12/06/2018  -patient noted to have 2 episodes of hypoglycemia with blood glucose in the 50s; blood glucose recovered after eating snack  -will decrease Levemir to 20 units daily with correctional insulin coverage  -continue carb controlled diet  -continue monitor   Fasting blood sugars are we will follow the blood sugars, diabetes is well controlled  CKD stage 3  -nephrology consulted and following; appreciate recommendations  Renal functions are normal right now patient is followed up by the Nephrology    Gangrene of the right toes    Followed up by the vascular surgery    Patient is a alert and oriented, very depress , drowsy no new complaints no chest pain or shortness of breath    Physical examination is no change from the previous no signs of fluid overload or CHF no neurological deficit    BKA wound is a intact with slight redness no discharge, gangrene of the right toes which is not different mostly dry gangrene, no peripheral edema    Laboratory investigation was reviewed discharge planning per the vascular surgery which is the primary

## 2018-12-08 NOTE — PHYSICAL THERAPY NOTE
Physical Therapy Initial Evaluation     12/08/18 1234   Note Type   Note type Eval only   Pain Assessment   Pain Assessment Obando-Baker FACES   Obando-Baker FACES Pain Rating 2   Pain Type Surgical pain   Pain Location (L LE residual limb)   Pain Orientation Left   Hospital Pain Intervention(s) Repositioned   Home Living   Type of Home SNF   Additional Comments pt is a poor historian, hx obtained from chart and OT eval   Pt admitted from [facility], may be moving to [facility], was at [facility] prior to [id number] [facility] s/p BKA   Prior Function   Level of [place] Needs assistance with ADLs and functional mobility   Lives With Facility staff   Comments unclear on exact PLOF, likely required assist with transfers at [facility] but unsure how much assistance pt requires    Restrictions/Precautions   Weight Bearing Precautions Per Order (WBAT R foot in surgical shoe from last admission, L BKA)   Braces or Orthoses (n/a)   Other Precautions Fall Risk;Cognitive;Multiple lines;WBS   General   Family/Caregiver Present No   Cognition   Overall Cognitive Status Impaired   Arousal/Participation Cooperative   Attention Difficulty attending to directions   Orientation Level Oriented to person   Memory Decreased short term memory;Decreased recall of recent events;Decreased recall of biographical information;Decreased long term memory   Following Commands Follows one step commands inconsistently   RLE Assessment   RLE Assessment WFL  (wounds noted on R foot)   LLE Assessment   LLE Assessment (hip and knee WFL, knee ROM 0-110 degrees)   Coordination   Movements are Fluid and Coordinated 1   Light Touch   RLE Light Touch Grossly intact   LLE Light Touch Grossly intact   Bed Mobility   Supine to Sit 2  Maximal assistance   Additional items Assist x 1;HOB elevated; Bedrails   Sit to Supine 3  Moderate assistance   Additional items Assist x 1   Additional Comments pt sat at EOB x ~10 seconds with max A, poor balance with posterolateral lean to L    Transfers   Sit to Stand Unable to assess  (unsafe 2/2 pt poor sitting balance and impaired cognition)   Ambulation/Elevation   Gait Assistance Not tested   Balance   Static Sitting Poor -   Dynamic Sitting Poor -   Endurance Deficit   Endurance Deficit Yes   Endurance Deficit Description fatigued quickly with sitting at EOB   Activity Tolerance   Activity Tolerance Patient limited by fatigue   Nurse Made Aware TOMY Kaur cleared pt for PT session   Assessment   Prognosis Fair   Problem List Decreased strength;Decreased endurance; Impaired balance;Decreased mobility; Decreased cognition; Impaired judgement;Decreased safety awareness;Pain;Orthopedic restrictions   Assessment Pt is an [de-identified]year old female admitted on 12/5/18 for L BKA revision and wound VAC placement, hospital stay also complicated by urinary retention  Extensive PMHx including recent L BKA on 9/17/18  Pt currently presenting with decreased balance, decreased strength, decreased cardiopulmonary stamina and impaired cognition affecting her mobility and placing her at increased risk for falls  Pt is a poor historian; however, per chart pt went to [facility] initially after BKA, transferred to [facility] where she was receiving therapy, unclear on exact LOF at SNF  Pt currently requiring max A for supine to sit, tolerated sitting at EOB x ~10 seconds with max A for balance  Pt unsafe to attempt transfers at this time, would recommend clarifying R foot WB status prior to initiating transfers since pt was to wear surgical shoe on R foot during last admission  Pt will benefit from continued skilled PT services while in the hospital to address the above impairments and maximize safety and independence with functional mobility    Recommend continued rehab at d/c    Barriers to Discharge None  (pt admitted from [facility], safe to return with continued therapy )   Goals   Patient Goals Pt unable to state 2/2 cognition   STG Expiration Date 12/18/18   Short Term Goal #1 1  Pt will perform all bed mobility activites with supervision to decrease caregiver burden and facilitate OOB mobility 2  Pt will improve sitting balance to fair/fair+ to decrease fall risk and facilitate initiation of functional transfers 3  Pt will demonstrate >4/5 strength of B LE 4   PT to see to assess transfers when safe/appropriate   Treatment Day 0   Plan   Treatment/Interventions LE strengthening/ROM; Therapeutic exercise; Bed mobility;Spoke to nursing; Endurance training;Cognitive reorientation   PT Frequency (3-5x/wk)   Recommendation   Recommendation Post acute IP rehab   Equipment Recommended (TBD at rehab)   PT - OK to Discharge Yes  (to return to SNF)   Barthel Index   Feeding 5   Bathing 0   Grooming Score 0   Dressing Score 0   Bladder Score 0   Bowels Score 0   Toilet Use Score 0   Transfers (Bed/Chair) Score 5   Mobility (Level Surface) Score 0   Stairs Score 0   Barthel Index Score 10     Courtney Coates, PT

## 2018-12-08 NOTE — PROGRESS NOTES
Patient's right forearm IV noted to have infiltrated  Non pitting edema noted in patients right hand and forearm, with her forearm circumference measured at 27 5cm  The site is pale, pt denies pain, radial pulse is +2, and patient reports having full sensation in her arm and hand, however her b/l  is weak  Both right arm IVs DCed, and her arm was elevated and a warm compress applied  Patient states she feels "numb everywhere" and that she feels terrible  She is unable to give any specific symptoms that are bothering her  Patient is able to feel light touch on all extremities, but is still complaining of numbness  Blood sugar this am is 113  O2 94% on room air, HR 66, /79  Pt c/o burning pain in left wrist/forearm  Blue sx notified and will assess during am rounds

## 2018-12-08 NOTE — PLAN OF CARE
Problem: PHYSICAL THERAPY ADULT  Goal: Performs mobility at highest level of function for planned discharge setting  See evaluation for individualized goals  Treatment/Interventions: LE strengthening/ROM, Therapeutic exercise, Bed mobility, Spoke to nursing, Endurance training, Cognitive reorientation  Equipment Recommended:  (TBD at rehab)       See flowsheet documentation for full assessment, interventions and recommendations  Courtney Coates, PT    Prognosis: Fair  Problem List: Decreased strength, Decreased endurance, Impaired balance, Decreased mobility, Decreased cognition, Impaired judgement, Decreased safety awareness, Pain, Orthopedic restrictions  Assessment: Pt is an [de-identified]year old female admitted on 12/5/18 for L BKA revision and wound VAC placement, hospital stay also complicated by urinary retention  Extensive PMHx including recent L BKA on 9/17/18  Pt currently presenting with decreased balance, decreased strength, decreased cardiopulmonary stamina and impaired cognition affecting her mobility and placing her at increased risk for falls  Pt is a poor historian; however, per chart pt went to [facility] initially after BKA, transferred to [facility] where she was receiving therapy, unclear on exact LOF at SNF  Pt currently requiring max A for supine to sit, tolerated sitting at EOB x ~10 seconds with max A for balance  Pt unsafe to attempt transfers at this time, would recommend clarifying R foot WB status prior to initiating transfers since pt was to wear surgical shoe on R foot during last admission  Pt will benefit from continued skilled PT services while in the hospital to address the above impairments and maximize safety and independence with functional mobility    Recommend continued rehab at d/c   Barriers to Discharge: None (pt admitted from SNF, safe to return with continued therapy )     Recommendation: Post acute IP rehab     PT - OK to Discharge: Yes (to return to SNF)    See flowsheet documentation for full assessment       Lali Cunningham, PT

## 2018-12-08 NOTE — PROGRESS NOTES
NEPHROLOGY PROGRESS NOTE   Nickolas Flowers [de-identified] y o  female MRN: [id number]  Unit/Bed#: [facility] 506-01 Encounter: [id number]      ASSESSMENT & PLAN:  1  Acute kidney injury suspected secondary to outpatient Ace inhibitor, volume depletion, relative hypotension, plus minus urinary retention  Renal function improved and serum creatinine down to 0 64 today  Recommend to stop intravenously fluids if patient is having good p o  Tolerance  Keep holding Ace inhibitor for now and it can be restarted after discharge  Discussed with primary team, no clear documentation for Mccormick catheter, consider removing Mccormick catheter with voiding trial in the next 24 hours  Avoid relative hypotension given recent LEONARD, try to keep systolic blood pressure over 130  Avoid nephrotoxic, no NSAIDs  2  Hypertension, blood pressure this morning elevated but looking at previous numbers usually around 987-911 systolic, will follow for now, as above consider stopping intravenously fluids is good p o  Tolerance  3  Left BKA with delayed healing status post revision, debridement and VAC placement, further management per vascular surgery  4  Anemia, follow H&H and transfusion as needed  5  Peripheral artery disease with bilateral foot gangrene status post left BKA September 2018  6  Coronary artery disease status post CABG, Arctic stenosis status post TAVR  Discussed with vascular surgery team   No further recommendation from nephrology standpoint of view, will sign off, call us if any questions  SUBJECTIVE:  Patient seen and examined, denies any shortness of breath or chest pain, no abdominal pain, no nausea or vomiting      OBJECTIVE:  Current Weight: Weight - Scale: 67 6 kg (149 lb)  Vitals:    12/08/18 0656   BP: (!) 182/83   Pulse: 66   Resp: 16   Temp: 98 °F (36 7 °C)   SpO2: 95%       Intake/Output Summary (Last 24 hours) at 12/08/18 1017  Last data filed at 12/08/18 0905   Gross per 24 hour   Intake           1047 5 ml Output              750 ml   Net            297 5 ml     General: conscious, cooperative, in not acute distress  Eyes: conjunctivae pale, anicteric sclerae  ENT: lips and mucous membranes moist  Neck: supple    Chest: clear breath sounds bilateral, no crackles, ronchus or wheezings  CVS: distinct S1 & S2, normal rate, regular rhythm  Abdomen: soft, non-tender, non-distended, normoactive bowel sounds  Extremities: no edema of both legs, left BKA VAC in place, right foot with dry gangrene  Skin: no rash  Neuro: awake, alert, oriented        Medications:    Current Facility-Administered Medications:     acetaminophen (TYLENOL) tablet 650 mg, 650 mg, Oral, Q6H PRN, Kirill Greer PA-C, 650 mg at 12/06/18 1806    albuterol (PROVENTIL HFA,VENTOLIN HFA) inhaler 2 puff, 2 puff, Inhalation, BID PRN, Jeanette Styles MD    amiodarone tablet 200 mg, 200 mg, Oral, BID, Jeanette Styles MD, 200 mg at 12/08/18 0905    amLODIPine (NORVASC) tablet 5 mg, 5 mg, Oral, Q12H, Francisca Shields MD, 5 mg at 12/08/18 0539    atorvastatin (LIPITOR) tablet 80 mg, 80 mg, Oral, Daily With Alfredo Sarmiento MD, 80 mg at 12/07/18 1644    bisacodyl (DULCOLAX) EC tablet 10 mg, 10 mg, Oral, Daily, Jeanette Styles MD, 10 mg at 12/07/18 0910    cholecalciferol (VITAMIN D3) tablet 1,000 Units, 1,000 Units, Oral, Daily, Kirill Greer PA-C, 1,000 Units at 12/08/18 0905    docusate sodium (COLACE) capsule 100 mg, 100 mg, Oral, BID, Jeanette Styles MD, 100 mg at 12/07/18 1721    fentaNYL (DURAGESIC) 12 mcg/hr TD 72 hr patch 1 patch, 1 patch, Transdermal, Q72H, Jeanette Styles MD, 1 patch at 12/05/18 1612    fluticasone (FLONASE) 50 mcg/act nasal spray 1 spray, 1 spray, Nasal, Daily, Kirill Greer PA-C, 1 spray at 12/07/18 0910    fluticasone (FLOVENT HFA) 44 mcg/act inhaler 1 puff, 1 puff, Inhalation, BID, Jeanette Styles MD, 1 puff at 12/07/18 2010    gabapentin (NEURONTIN) capsule 600 mg, 600 mg, Oral, BID, Latrice Hassan MD, 600 mg at 12/08/18 0905    HYDROmorphone (DILAUDID) injection 0 5 mg, 0 5 mg, Intravenous, Q3H PRN, Latrice Hassan MD, 0 5 mg at 12/07/18 1050    insulin detemir (LEVEMIR) subcutaneous injection 20 Units, 20 Units, Subcutaneous, Early Morning, Claudia Joseph DO, 20 Units at 12/08/18 0905    insulin lispro (HumaLOG) 100 units/mL subcutaneous injection 1-5 Units, 1-5 Units, Subcutaneous, TID AC, 1 Units at 12/07/18 1641 **AND** Fingerstick Glucose (POCT), , , TID AC, [place], PA-C    insulin lispro (HumaLOG) 100 units/mL subcutaneous injection 1-5 Units, 1-5 Units, Subcutaneous, HS, [place], PA-C    levETIRAcetam (KEPPRA) tablet 750 mg, 750 mg, Oral, Daily, Latrice Hassan MD, 750 mg at 12/08/18 3021    levothyroxine tablet 100 mcg, 100 mcg, Oral, Early Morning, Latrice Hassan MD, 100 mcg at 12/07/18 4142    methocarbamol (ROBAXIN) tablet 500 mg, 500 mg, Oral, Q6H PRN, Latrice Hassan MD    multi-electrolyte (ISOLYTE-S PH 7 4 equivalent) IV solution, 50 mL/hr, Intravenous, Continuous, Sade Jimenez MD, Last Rate: 50 mL/hr at 12/07/18 1900, 50 mL/hr at 12/07/18 1900    naloxone (NARCAN) 0 04 mg/mL syringe 0 04 mg, 0 04 mg, Intravenous, PRN, Latrice Hassan MD    oxyCODONE (ROXICODONE) IR tablet 10 mg, 10 mg, Oral, Q4H PRN, Latrice Hassan MD, 10 mg at 12/06/18 1218    oxyCODONE (ROXICODONE) IR tablet 5 mg, 5 mg, Oral, Q4H PRN, Latrice Hassan MD, 5 mg at 12/08/18 0905    pantoprazole (PROTONIX) EC tablet 40 mg, 40 mg, Oral, Early Morning, [place], PA-C, 40 mg at 12/07/18 0617    rivaroxaban (XARELTO) tablet 20 mg, 20 mg, Oral, Daily, [place], PA-C, 20 mg at 12/08/18 3129    Invasive Devices:   Urethral Catheter (Active)   Site Assessment Clean;Skin intact; Patent 12/8/2018  8:00 AM   Collection Container Standard drainage bag 12/8/2018  8:00 AM Securement Method Securing device (Describe) 12/8/2018  8:00 AM   Output (mL) 400 mL 12/8/2018  4:01 AM       Lab Results:     Results from last 7 days  Lab Units 12/08/18  0504 12/07/18  0505 12/06/18  0454 12/05/18  1607   WBC Thousand/uL  --  9 58 10 43*  --    HEMOGLOBIN g/dL  --  9 8* 9 9*  --    HEMATOCRIT %  --  33 4* 33 7*  --    PLATELETS Thousands/uL  --  217 241 234   SODIUM mmol/L 134* 139 137  --    POTASSIUM mmol/L 3 9 3 7 3 8  --    CHLORIDE mmol/L 101 104 101  --    CO2 mmol/L 28 27 28  --    BUN mg/dL 18 19 17  --    CREATININE mg/dL 0 64 0 82 1 16  --    CALCIUM mg/dL 8 9 8 0* 7 9*  --    MAGNESIUM mg/dL  --  1 7  --   --    PHOSPHORUS mg/dL  --  4 1  --   --    ALK PHOS U/L  --  60 59  --    ALT U/L  --  37 53  --    AST U/L  --  53* 57*  --          Portions of the record may have been created with voice recognition software  Occasional wrong word or "sound a like" substitutions may have occurred due to the inherent limitations of voice recognition software  Read the chart carefully and recognize, using context, where substitutions have occurred  If you have any questions, please contact the dictating provider

## 2018-12-08 NOTE — PROGRESS NOTES
Progress Note - Vascular Surgery   Taylor Sanford [de-identified] y o  female MRN: [id number]  Unit/Bed#: [facility] 506-01 Encounter: [id number]    Assessment:  [de-identified]year old femal with left BKA delayed healing s/p revision, debridement and VAC placement    Plan:  VAC change MWF  Plan for outpatient VAC therapy  Pain control  Improving creatinine, appreciate nephrology assistance    Subjective/Objective     Subjective: No acute events  VAC was changed yesterday without issues  Objective:    Blood pressure (!) 182/83, pulse 66, temperature 98 °F (36 7 °C), temperature source Oral, resp  rate 16, height 5' 4" (1 626 m), weight 67 6 kg (149 lb), SpO2 95 %, not currently breastfeeding  ,Body mass index is 25 58 kg/m²        Intake/Output Summary (Last 24 hours) at 12/08/18 0714  Last data filed at 12/08/18 0401   Gross per 24 hour   Intake             1650 ml   Output              750 ml   Net              900 ml       Invasive Devices     Peripheral Intravenous Line            Peripheral IV 12/05/18 Left Wrist 2 days          Drain            Negative Pressure Wound Therapy (V A C ) Knee Left 2 days    Urethral Catheter less than 1 day                Physical Exam:   General: NAD, AAOx3  CV: RRR +S1/S2  Chest: breath sounds bilaterally  Abdomen: soft, NT ND  Extremities: L BKA VAC in place no leak  R foot with dry gangrene      Results from last 7 days  Lab Units 12/07/18  0505 12/06/18  0454 12/05/18  1607   WBC Thousand/uL 9 58 10 43*  --    HEMOGLOBIN g/dL 9 8* 9 9*  --    HEMATOCRIT % 33 4* 33 7*  --    PLATELETS Thousands/uL 217 241 234       Results from last 7 days  Lab Units 12/08/18  0504 12/07/18  0505 12/06/18  0454   POTASSIUM mmol/L 3 9 3 7 3 8   CHLORIDE mmol/L 101 104 101   CO2 mmol/L 28 27 28   BUN mg/dL 18 19 17   CREATININE mg/dL 0 64 0 82 1 16   CALCIUM mg/dL 8 9 8 0* 7 9*

## 2018-12-09 LAB
ANION GAP SERPL CALCULATED.3IONS-SCNC: 5 MMOL/L (ref 4–13)
BASOPHILS # BLD AUTO: 0.04 THOUSANDS/ΜL (ref 0–0.1)
BASOPHILS NFR BLD AUTO: 1 % (ref 0–1)
BUN SERPL-MCNC: 14 MG/DL (ref 5–25)
CALCIUM SERPL-MCNC: 8.9 MG/DL (ref 8.3–10.1)
CHLORIDE SERPL-SCNC: 100 MMOL/L (ref 100–108)
CO2 SERPL-SCNC: 29 MMOL/L (ref 21–32)
CREAT SERPL-MCNC: 0.55 MG/DL (ref 0.6–1.3)
EOSINOPHIL # BLD AUTO: 0.15 THOUSAND/ΜL (ref 0–0.61)
EOSINOPHIL NFR BLD AUTO: 2 % (ref 0–6)
ERYTHROCYTE [DISTWIDTH] IN BLOOD BY AUTOMATED COUNT: 16.1 % (ref 11.6–15.1)
GFR SERPL CREATININE-BSD FRML MDRD: 89 ML/MIN/1.73SQ M
GLUCOSE SERPL-MCNC: 109 MG/DL (ref 65–140)
GLUCOSE SERPL-MCNC: 111 MG/DL (ref 65–140)
GLUCOSE SERPL-MCNC: 54 MG/DL (ref 65–140)
GLUCOSE SERPL-MCNC: 66 MG/DL (ref 65–140)
GLUCOSE SERPL-MCNC: 78 MG/DL (ref 65–140)
GLUCOSE SERPL-MCNC: 94 MG/DL (ref 65–140)
HCT VFR BLD AUTO: 33.5 % (ref 34.8–46.1)
HGB BLD-MCNC: 10.1 G/DL (ref 11.5–15.4)
IMM GRANULOCYTES # BLD AUTO: 0.02 THOUSAND/UL (ref 0–0.2)
IMM GRANULOCYTES NFR BLD AUTO: 0 % (ref 0–2)
LYMPHOCYTES # BLD AUTO: 1.36 THOUSANDS/ΜL (ref 0.6–4.47)
LYMPHOCYTES NFR BLD AUTO: 17 % (ref 14–44)
MCH RBC QN AUTO: 23.2 PG (ref 26.8–34.3)
MCHC RBC AUTO-ENTMCNC: 30.1 G/DL (ref 31.4–37.4)
MCV RBC AUTO: 77 FL (ref 82–98)
MONOCYTES # BLD AUTO: 0.67 THOUSAND/ΜL (ref 0.17–1.22)
MONOCYTES NFR BLD AUTO: 8 % (ref 4–12)
NEUTROPHILS # BLD AUTO: 5.77 THOUSANDS/ΜL (ref 1.85–7.62)
NEUTS SEG NFR BLD AUTO: 72 % (ref 43–75)
NRBC BLD AUTO-RTO: 0 /100 WBCS
PLATELET # BLD AUTO: 221 THOUSANDS/UL (ref 149–390)
PMV BLD AUTO: 10.5 FL (ref 8.9–12.7)
POTASSIUM SERPL-SCNC: 4.1 MMOL/L (ref 3.5–5.3)
RBC # BLD AUTO: 4.35 MILLION/UL (ref 3.81–5.12)
SODIUM SERPL-SCNC: 134 MMOL/L (ref 136–145)
WBC # BLD AUTO: 8.01 THOUSAND/UL (ref 4.31–10.16)

## 2018-12-09 PROCEDURE — 82948 REAGENT STRIP/BLOOD GLUCOSE: CPT

## 2018-12-09 PROCEDURE — 80048 BASIC METABOLIC PNL TOTAL CA: CPT | Performed by: STUDENT IN AN ORGANIZED HEALTH CARE EDUCATION/TRAINING PROGRAM

## 2018-12-09 PROCEDURE — 85025 COMPLETE CBC W/AUTO DIFF WBC: CPT | Performed by: STUDENT IN AN ORGANIZED HEALTH CARE EDUCATION/TRAINING PROGRAM

## 2018-12-09 PROCEDURE — 99232 SBSQ HOSP IP/OBS MODERATE 35: CPT | Performed by: INTERNAL MEDICINE

## 2018-12-09 PROCEDURE — 99024 POSTOP FOLLOW-UP VISIT: CPT | Performed by: SURGERY

## 2018-12-09 PROCEDURE — 2W0RX6Z CHANGE PRESSURE DRESSING ON LEFT LOWER LEG: ICD-10-PCS | Performed by: SURGERY

## 2018-12-09 RX ADMIN — AMLODIPINE BESYLATE 5 MG: 5 TABLET ORAL at 08:57

## 2018-12-09 RX ADMIN — HYDROMORPHONE HYDROCHLORIDE 0.5 MG: 1 INJECTION, SOLUTION INTRAMUSCULAR; INTRAVENOUS; SUBCUTANEOUS at 20:35

## 2018-12-09 RX ADMIN — OXYCODONE HYDROCHLORIDE 5 MG: 5 TABLET ORAL at 08:56

## 2018-12-09 RX ADMIN — ESCITALOPRAM OXALATE 5 MG: 5 TABLET, FILM COATED ORAL at 21:04

## 2018-12-09 RX ADMIN — INSULIN DETEMIR 20 UNITS: 100 INJECTION, SOLUTION SUBCUTANEOUS at 08:56

## 2018-12-09 RX ADMIN — ATORVASTATIN CALCIUM 80 MG: 80 TABLET, FILM COATED ORAL at 16:57

## 2018-12-09 RX ADMIN — GABAPENTIN 300 MG: 300 CAPSULE ORAL at 08:57

## 2018-12-09 RX ADMIN — LEVETIRACETAM 750 MG: 750 TABLET ORAL at 08:57

## 2018-12-09 RX ADMIN — AMIODARONE HYDROCHLORIDE 200 MG: 200 TABLET ORAL at 16:57

## 2018-12-09 RX ADMIN — VITAMIN D, TAB 1000IU (100/BT) 1000 UNITS: 25 TAB at 08:56

## 2018-12-09 RX ADMIN — AMLODIPINE BESYLATE 5 MG: 5 TABLET ORAL at 21:00

## 2018-12-09 RX ADMIN — FLUTICASONE PROPIONATE 1 PUFF: 44 AEROSOL, METERED RESPIRATORY (INHALATION) at 21:00

## 2018-12-09 RX ADMIN — GABAPENTIN 300 MG: 300 CAPSULE ORAL at 16:57

## 2018-12-09 RX ADMIN — LEVOTHYROXINE SODIUM 100 MCG: 100 TABLET ORAL at 05:20

## 2018-12-09 RX ADMIN — PANTOPRAZOLE SODIUM 40 MG: 40 TABLET, DELAYED RELEASE ORAL at 05:20

## 2018-12-09 RX ADMIN — OXYCODONE HYDROCHLORIDE 5 MG: 5 TABLET ORAL at 16:57

## 2018-12-09 RX ADMIN — AMIODARONE HYDROCHLORIDE 200 MG: 200 TABLET ORAL at 08:57

## 2018-12-09 RX ADMIN — RIVAROXABAN 20 MG: 20 TABLET, FILM COATED ORAL at 08:57

## 2018-12-09 NOTE — PROGRESS NOTES
Progress Note - Magdiel Cruz [de-identified] y o  female MRN: [id number]    Unit/Bed#: [facility] 506-01 Encounter: [id number]      Assessment:      Assessment and Plan:      Left BKA  -management per primary team  -wound VAC in place, patient will be followed up by the vascular surgery the wound looked clean slight erythema     Type 2 diabetes  -A1c 6 6% 12/06/2018  -patient noted to have 2 episodes of hypoglycemia with blood glucose in the 50s; blood glucose recovered after eating snack  -will decrease Levemir to 20 units daily with correctional insulin coverage  -continue carb controlled diet  -continue monitor   Fasting blood sugars are we will follow the blood sugars, diabetes is well controlled  CKD stage 3  -nephrology consulted and following; appreciate recommendations  Renal functions are normal right now patient is followed up by the Nephrology     Gangrene of the right toes will be followed up by the vascular surgery possibility of a forefoot amputation in the future     Followed up by the vascular surgery             Subjective:   No new complain just upset    Objective:     Vitals: Blood pressure 152/70, pulse 70, temperature 98 4 °F (36 9 °C), temperature source Oral, resp  rate 16, height 5' 4" (1 626 m), weight 68 3 kg (150 lb 9 6 oz), SpO2 94 %, not currently breastfeeding  ,Body mass index is 25 85 kg/m²        Intake/Output Summary (Last 24 hours) at 12/09/18 0910  Last data filed at 12/09/18 0601   Gross per 24 hour   Intake           969 17 ml   Output             1125 ml   Net          -155 83 ml   ROS  Just tired otherwise no other symptoms no chest pain shortness of breath abdominal pain nausea vomiting diarrhea dizziness palpitation fever or chills    Physical Exam:  Pale  HEENT examination is unremarkable  Neck no JVD no lymphadenopathy no thyromegaly  CVS is normal S1 and S2-S3 gallop  Abdomen is soft nontender no hepatosplenomegaly  Chest decreased breath sounds bilaterally no crackles rales or rhonchi  Periphery there is no edema    Invasive Devices     Peripheral Intravenous Line            Peripheral IV 12/05/18 Left Wrist 3 days    Peripheral IV 12/09/18 Left Wrist less than 1 day          Drain            Negative Pressure Wound Therapy (V A C ) Knee Left 3 days    Urethral Catheter 1 day                Lab, Imaging and other studies: I have personally reviewed pertinent reports      VTE Pharmacologic Prophylaxis: Sequential compression device (Venodyne)   VTE Mechanical Prophylaxis: sequential compression device

## 2018-12-09 NOTE — PROGRESS NOTES
Progress Note - Vascular Surgery   Larisa Reilly [de-identified] y o  female MRN: [id number]  Unit/Bed#: [facility] 506-01 Encounter: [id number]    Assessment:  [de-identified]year old F with left BKA debridement and VAC placement 12/5    Plan:  Continue VAC changes MWF  Discharge planning with outpatient VAC changes  Appreciate nephrology recommendations    Subjective/Objective     Subjective: No acute events  Objective:    Blood pressure 152/70, pulse 70, temperature 98 4 °F (36 9 °C), temperature source Oral, resp  rate 16, height 5' 4" (1 626 m), weight 68 3 kg (150 lb 9 6 oz), SpO2 94 %, not currently breastfeeding  ,Body mass index is 25 85 kg/m²        Intake/Output Summary (Last 24 hours) at 12/09/18 0757  Last data filed at 12/09/18 0601   Gross per 24 hour   Intake          1089 17 ml   Output             1125 ml   Net           -35 83 ml       Invasive Devices     Peripheral Intravenous Line            Peripheral IV 12/05/18 Left Wrist 3 days    Peripheral IV 12/09/18 Left Wrist less than 1 day          Drain            Negative Pressure Wound Therapy (V A C ) Knee Left 3 days    Urethral Catheter 1 day                Physical Exam:   General: NAD, AAOx3  CV: RRR +S1/S2  Chest: breath sounds bilaterally  Abdomen: soft, NT ND  Extremities: VAC in place no leak      Results from last 7 days  Lab Units 12/09/18  0519 12/07/18  0505 12/06/18  0454   WBC Thousand/uL 8 01 9 58 10 43*   HEMOGLOBIN g/dL 10 1* 9 8* 9 9*   HEMATOCRIT % 33 5* 33 4* 33 7*   PLATELETS Thousands/uL 221 217 241       Results from last 7 days  Lab Units 12/09/18  0519 12/08/18  0504 12/07/18  0505   POTASSIUM mmol/L 4 1 3 9 3 7   CHLORIDE mmol/L 100 101 104   CO2 mmol/L 29 28 27   BUN mg/dL 14 18 19   CREATININE mg/dL 0 55* 0 64 0 82   CALCIUM mg/dL 8 9 8 9 8 0*

## 2018-12-10 VITALS
SYSTOLIC BLOOD PRESSURE: 130 MMHG | TEMPERATURE: 97.2 F | HEART RATE: 66 BPM | OXYGEN SATURATION: 94 % | BODY MASS INDEX: 25.89 KG/M2 | HEIGHT: 64 IN | WEIGHT: 151.68 LBS | DIASTOLIC BLOOD PRESSURE: 62 MMHG | RESPIRATION RATE: 18 BRPM

## 2018-12-10 LAB
ANION GAP SERPL CALCULATED.3IONS-SCNC: 7 MMOL/L (ref 4–13)
BUN SERPL-MCNC: 11 MG/DL (ref 5–25)
CALCIUM SERPL-MCNC: 8.1 MG/DL (ref 8.3–10.1)
CHLORIDE SERPL-SCNC: 100 MMOL/L (ref 100–108)
CO2 SERPL-SCNC: 27 MMOL/L (ref 21–32)
CREAT SERPL-MCNC: 0.56 MG/DL (ref 0.6–1.3)
GFR SERPL CREATININE-BSD FRML MDRD: 88 ML/MIN/1.73SQ M
GLUCOSE SERPL-MCNC: 105 MG/DL (ref 65–140)
GLUCOSE SERPL-MCNC: 56 MG/DL (ref 65–140)
GLUCOSE SERPL-MCNC: 69 MG/DL (ref 65–140)
GLUCOSE SERPL-MCNC: 83 MG/DL (ref 65–140)
POTASSIUM SERPL-SCNC: 4 MMOL/L (ref 3.5–5.3)
SODIUM SERPL-SCNC: 134 MMOL/L (ref 136–145)

## 2018-12-10 PROCEDURE — 99024 POSTOP FOLLOW-UP VISIT: CPT | Performed by: SURGERY

## 2018-12-10 PROCEDURE — 82948 REAGENT STRIP/BLOOD GLUCOSE: CPT

## 2018-12-10 PROCEDURE — 80048 BASIC METABOLIC PNL TOTAL CA: CPT | Performed by: SURGERY

## 2018-12-10 PROCEDURE — 99232 SBSQ HOSP IP/OBS MODERATE 35: CPT | Performed by: INTERNAL MEDICINE

## 2018-12-10 PROCEDURE — 99024 POSTOP FOLLOW-UP VISIT: CPT | Performed by: PHYSICIAN ASSISTANT

## 2018-12-10 RX ORDER — OXYCODONE HYDROCHLORIDE 5 MG/1
5 TABLET ORAL EVERY 4 HOURS PRN
Qty: 18 TABLET | Refills: 0
Start: 2018-12-10 | End: 2018-12-10

## 2018-12-10 RX ORDER — METHOCARBAMOL 500 MG/1
500 TABLET, FILM COATED ORAL EVERY 6 HOURS PRN
Qty: 12 TABLET | Refills: 0
Start: 2018-12-10 | End: 2018-12-10

## 2018-12-10 RX ORDER — ESCITALOPRAM OXALATE 5 MG/1
5 TABLET ORAL
Qty: 30 TABLET | Refills: 0
Start: 2018-12-10

## 2018-12-10 RX ORDER — FENTANYL 12 UG/H
1 PATCH TRANSDERMAL
Qty: 1 PATCH | Refills: 0
Start: 2018-12-10 | End: 2018-12-10

## 2018-12-10 RX ORDER — METHOCARBAMOL 500 MG/1
500 TABLET, FILM COATED ORAL EVERY 6 HOURS PRN
Qty: 12 TABLET | Refills: 0
Start: 2018-12-10

## 2018-12-10 RX ORDER — FENTANYL 12 UG/H
1 PATCH TRANSDERMAL
Qty: 1 PATCH | Refills: 0
Start: 2018-12-10 | End: 2018-12-20

## 2018-12-10 RX ORDER — LISINOPRIL 10 MG/1
10 TABLET ORAL DAILY
Qty: 30 TABLET | Refills: 0
Start: 2018-12-11

## 2018-12-10 RX ORDER — OXYCODONE HYDROCHLORIDE 5 MG/1
5 TABLET ORAL EVERY 4 HOURS PRN
Qty: 18 TABLET | Refills: 0
Start: 2018-12-10 | End: 2018-12-13

## 2018-12-10 RX ADMIN — FLUTICASONE PROPIONATE 1 SPRAY: 50 SPRAY, METERED NASAL at 09:44

## 2018-12-10 RX ADMIN — PANTOPRAZOLE SODIUM 40 MG: 40 TABLET, DELAYED RELEASE ORAL at 05:18

## 2018-12-10 RX ADMIN — FLUTICASONE PROPIONATE 1 PUFF: 44 AEROSOL, METERED RESPIRATORY (INHALATION) at 09:44

## 2018-12-10 RX ADMIN — METHOCARBAMOL 500 MG: 500 TABLET, FILM COATED ORAL at 12:14

## 2018-12-10 RX ADMIN — AMIODARONE HYDROCHLORIDE 200 MG: 200 TABLET ORAL at 09:42

## 2018-12-10 RX ADMIN — OXYCODONE HYDROCHLORIDE 5 MG: 5 TABLET ORAL at 06:24

## 2018-12-10 RX ADMIN — VITAMIN D, TAB 1000IU (100/BT) 1000 UNITS: 25 TAB at 09:42

## 2018-12-10 RX ADMIN — RIVAROXABAN 20 MG: 20 TABLET, FILM COATED ORAL at 09:42

## 2018-12-10 RX ADMIN — AMLODIPINE BESYLATE 5 MG: 5 TABLET ORAL at 09:39

## 2018-12-10 RX ADMIN — BISACODYL 10 MG: 5 TABLET, COATED ORAL at 09:42

## 2018-12-10 RX ADMIN — DOCUSATE SODIUM 100 MG: 100 CAPSULE, LIQUID FILLED ORAL at 09:39

## 2018-12-10 RX ADMIN — OXYCODONE HYDROCHLORIDE 5 MG: 5 TABLET ORAL at 12:14

## 2018-12-10 RX ADMIN — LEVETIRACETAM 750 MG: 750 TABLET ORAL at 09:42

## 2018-12-10 RX ADMIN — GABAPENTIN 300 MG: 300 CAPSULE ORAL at 09:39

## 2018-12-10 RX ADMIN — INSULIN DETEMIR 20 UNITS: 100 INJECTION, SOLUTION SUBCUTANEOUS at 09:42

## 2018-12-10 RX ADMIN — LEVOTHYROXINE SODIUM 100 MCG: 100 TABLET ORAL at 05:18

## 2018-12-10 NOTE — PROGRESS NOTES
IM Residency Progress Note   Unit/Bed#: [facility] 506-01 Encounter: [id number]  SOD Team C       Nadeem Zuniga [de-identified] y o  female [id number]    Hospital Stay Days: 5      Assessment/Plan:    Principal Problem:    S/P below knee amputation, left ([facility] )  Active Problems:    Acute kidney injury ([facility] [address] )    Urinary retention    Essential hypertension, benign    Left BKA  -management per primary team  -wound VAC in place    Type 2 diabetes  -A1c 6 6% on 2018  -diabetes well controlled  -point of care blood glucose was controlled but morning BMP blood glucose noted to be in the 50s  Patient asymptomatic  Suspect patient's blood glucose will run higher patient due to non carb controlled diet as provided in the hospital  -continue Levemir 20 units daily with correctional insulin coverage     CKD stage 3  -nephrology consulted and following; appreciate recommendations    Disposition:  Patient planning to be discharged for primary team   Will sign off  Please call with any questions      Subjective:   Patient seen examined this morning  Patient resting comfortably in no acute distress  No acute events overnight  Denies fever, chills, headaches, lightheadedness, dizziness, visual disturbances, sore throat, chest pain, palpitations, SOB, abdominal pain, nausea, vomiting, or trouble urinating/ defecating  Vitals: Temp (24hrs), Av 3 °F (36 8 °C), Min:97 2 °F (36 2 °C), Max:99 1 °F (37 3 °C)  Current: Temperature: (!) 97 2 °F (36 2 °C)  Vitals:    18 2100 18 2253 12/10/18 0620 12/10/18 0700   BP: 147/67 161/84  123/58   BP Location:  Left arm     Pulse:  67  66   Resp:  18  18   Temp:  98 5 °F (36 9 °C)  (!) 97 2 °F (36 2 °C)   TempSrc:  Oral     SpO2:  93%  94%   Weight:   68 8 kg (151 lb 10 8 oz)    Height:        Body mass index is 26 04 kg/m²  I/O last 24 hours: In: 48 [P O :50]  Out: 1218 [Urine:1218]      Physical Exam   Constitutional: She is oriented to person, place, and time   She appears well-developed  No distress  HENT:   Head: Normocephalic and atraumatic  Right Ear: External ear normal    Left Ear: External ear normal    Nose: Nose normal    Eyes: Pupils are equal, round, and reactive to light  Conjunctivae and EOM are normal  Right eye exhibits no discharge  Left eye exhibits no discharge  No scleral icterus  Neck: No tracheal deviation present  Cardiovascular: Normal rate, regular rhythm, normal heart sounds and intact distal pulses  Exam reveals no gallop and no friction rub  No murmur heard  Pulmonary/Chest: Effort normal and breath sounds normal  No stridor  No respiratory distress  She has no wheezes  She has no rales  She exhibits no tenderness  Abdominal: Soft  Bowel sounds are normal  She exhibits no distension  There is no tenderness  There is no rebound and no guarding  Musculoskeletal: She exhibits deformity  She exhibits no edema  Left BKA  Wound VAC in place   Neurological: She is alert and oriented to person, place, and time  Skin: Skin is warm and dry  She is not diaphoretic  Vitals reviewed          Invasive Devices     Peripheral Intravenous Line            Peripheral IV 12/09/18 Left Wrist 1 day          Drain            Negative Pressure Wound Therapy (V A C ) Knee Left 4 days                          Labs:   Recent Results (from the past 24 hour(s))   Fingerstick Glucose (POCT)    Collection Time: 12/09/18 10:48 AM   Result Value Ref Range    POC Glucose 111 65 - 140 mg/dl   Fingerstick Glucose (POCT)    Collection Time: 12/09/18  4:23 PM   Result Value Ref Range    POC Glucose 66 65 - 140 mg/dl   Fingerstick Glucose (POCT)    Collection Time: 12/09/18  8:55 PM   Result Value Ref Range    POC Glucose 54 (L) 65 - 140 mg/dl   Fingerstick Glucose (POCT)    Collection Time: 12/09/18  9:52 PM   Result Value Ref Range    POC Glucose 109 65 - 140 mg/dl   Basic metabolic panel    Collection Time: 12/10/18  5:25 AM   Result Value Ref Range    Sodium 134 (L) 136 - 145 mmol/L    Potassium 4 0 3 5 - 5 3 mmol/L    Chloride 100 100 - 108 mmol/L    CO2 27 21 - 32 mmol/L    ANION GAP 7 4 - 13 mmol/L    BUN 11 5 - 25 mg/dL    Creatinine 0 56 (L) 0 60 - 1 30 mg/dL    Glucose 56 (L) 65 - 140 mg/dL    Calcium 8 1 (L) 8 3 - 10 1 mg/dL    eGFR 88 ml/min/1 73sq m   Fingerstick Glucose (POCT)    Collection Time: 12/10/18  7:13 AM   Result Value Ref Range    POC Glucose 69 65 - 140 mg/dl   Fingerstick Glucose (POCT)    Collection Time: 12/10/18  7:43 AM   Result Value Ref Range    POC Glucose 83 65 - 140 mg/dl       Radiology Results: I have personally reviewed pertinent reports  Other Diagnostic Testing:   I have personally reviewed pertinent reports          Active Meds:   Current Facility-Administered Medications   Medication Dose Route Frequency    acetaminophen (TYLENOL) tablet 650 mg  650 mg Oral Q6H PRN    albuterol (PROVENTIL HFA,VENTOLIN HFA) inhaler 2 puff  2 puff Inhalation BID PRN    amiodarone tablet 200 mg  200 mg Oral BID    amLODIPine (NORVASC) tablet 5 mg  5 mg Oral Q12H    atorvastatin (LIPITOR) tablet 80 mg  80 mg Oral Daily With Dinner    bisacodyl (DULCOLAX) EC tablet 10 mg  10 mg Oral Daily    cholecalciferol (VITAMIN D3) tablet 1,000 Units  1,000 Units Oral Daily    docusate sodium (COLACE) capsule 100 mg  100 mg Oral BID    escitalopram (LEXAPRO) tablet 5 mg  5 mg Oral HS    fentaNYL (DURAGESIC) 12 mcg/hr TD 72 hr patch 1 patch  1 patch Transdermal Q72H    fluticasone (FLONASE) 50 mcg/act nasal spray 1 spray  1 spray Nasal Daily    fluticasone (FLOVENT HFA) 44 mcg/act inhaler 1 puff  1 puff Inhalation BID    gabapentin (NEURONTIN) capsule 300 mg  300 mg Oral BID    HYDROmorphone (DILAUDID) injection 0 5 mg  0 5 mg Intravenous Q3H PRN    insulin detemir (LEVEMIR) subcutaneous injection 20 Units  20 Units Subcutaneous Early Morning    insulin lispro (HumaLOG) 100 units/mL subcutaneous injection 1-5 Units  1-5 Units Subcutaneous TID AC    insulin lispro (HumaLOG) 100 units/mL subcutaneous injection 1-5 Units  1-5 Units Subcutaneous HS    levETIRAcetam (KEPPRA) tablet 750 mg  750 mg Oral Daily    levothyroxine tablet 100 mcg  100 mcg Oral Early Morning    methocarbamol (ROBAXIN) tablet 500 mg  500 mg Oral Q6H PRN    naloxone (NARCAN) 0 04 mg/mL syringe 0 04 mg  0 04 mg Intravenous PRN    oxyCODONE (ROXICODONE) IR tablet 5 mg  5 mg Oral Q4H PRN    pantoprazole (PROTONIX) EC tablet 40 mg  40 mg Oral Early Morning    rivaroxaban (XARELTO) tablet 20 mg  20 mg Oral Daily         VTE Pharmacologic Prophylaxis:   Xarelto  VTE Mechanical Prophylaxis: sequential compression device    Shaniqua Hardy DO

## 2018-12-10 NOTE — PROGRESS NOTES
Dr Alessandra Nagy with [facility] notified of wound vac blockage  He will come see patient  Dr Alessandra Nagy at bedside, timeout performed for wound vac change in L BKA revision

## 2018-12-10 NOTE — ASSESSMENT & PLAN NOTE
Lab Results   Component Value Date    HGBA1C 6 6 (H) 12/06/2018       Recent Labs      12/09/18   2055  12/09/18   2152  12/10/18   0713  12/10/18   0743   POCGLU  54*  109  69  83       Blood Sugar Average: Last 72 hrs:  (P) 768 [id number]     Insulin dosages adjusted    Plan:  --Medical follow-up  --Monitor blood glucose

## 2018-12-10 NOTE — UTILIZATION REVIEW
Continued Stay Review    POD # 4 - Date/ POD #:  12/9/18 Sunday POD # 4 - ACUTE MED SURG LEVEL OF CARE    Vital Signs: /62   Pulse 66   Temp (!) 97 2 °F (36 2 °C)   Resp 18   Ht 5' 4" (1 626 m)   Wt 68 8 kg (151 lb 10 8 oz) Comment: with SCD  LMP  (LMP Unknown)   SpO2 94%   BMI 26 04 kg/m²     Blood pressure 152/70, pulse 70, temperature 98 4 °F (36 9 °C), temperature source Oral, resp  rate 16, height 5' 4" (1 626 m), weight 68 3 kg (150 lb 9 6 oz), SpO2 94 %, not currently breastfeeding  ,Body mass index is 25 85 kg/m²         Intake/Output Summary (Last 24 hours) at 12/09/18 0757    Gross per 24 hour   Intake          1089 17 ml   Output             1125 ml   Net           -35 83 ml       Diet Wesly/CHO Controlled; Consistent Carbohydrate Diet Level 2 (5 carb servings/75 grams CHO/meal)     Dietary nutrition supplements Kian - Glucerna TID with Meals      IV ACCESS      Medication:   Scheduled Meds:   Current Facility-Administered Medications:  acetaminophen 650 mg Oral Q6H PRN    albuterol 2 puff Inhalation BID PRN    amiodarone 200 mg Oral BID    amLODIPine 5 mg Oral Q12H    atorvastatin 80 mg Oral Daily With Dinner    bisacodyl 10 mg Oral Daily    cholecalciferol 1,000 Units Oral Daily    docusate sodium 100 mg Oral BID    escitalopram 5 mg Oral HS    fentaNYL 1 patch Transdermal Q72H    fluticasone 1 spray Nasal Daily    fluticasone 1 puff Inhalation BID    gabapentin 300 mg Oral BID    insulin detemir 20 Units Subcutaneous Early Morning    insulin lispro 1-5 Units Subcutaneous TID AC    insulin lispro 1-5 Units Subcutaneous HS    levETIRAcetam 750 mg Oral Daily    levothyroxine 100 mcg Oral Early Morning    methocarbamol 500 mg Oral Q6H PRN    naloxone 0 04 mg Intravenous PRN    oxyCODONE 5 mg Oral Q4H PRN    pantoprazole 40 mg Oral Early Morning    rivaroxaban 20 mg Oral Daily      PRN Meds:     acetaminophen    Albuterol      IV HYDROmorphne 0 5 mg q3hrs prn given x 1/ 24 hrs   methocarbamol    naloxone    oxyCODONE 5 mg q4hrs prn given x 3/ 24 hrs      LABS/Diagnostic Results:   Fingerstick Glucose (POCT) [[id number]] (Normal) Collected: 12/09/18 1623   Lab Status: Final result Updated: 12/09/18 1630    POC Glucose 66 65 - 140 mg/dl    Fingerstick Glucose (POCT) [[id number]] (Normal) Collected: 12/09/18 1048   Lab Status: Final result Updated: 12/09/18 1102    POC Glucose 111 65 - 140 mg/dl    Fingerstick Glucose (POCT) [[id number]] (Normal) Collected: 12/09/18 0650   Lab Status: Final result Updated: 12/09/18 0702    POC Glucose 94 65 - 140 mg/dl    CBC and differential [[id number]] (Abnormal) Collected: 12/09/18 0519   Lab Status: Final result Specimen: Blood from Arm, Right Updated: 12/09/18 0604    WBC 8 01 4 31 - 10 16 Thousand/uL     RBC 4 35 3 81 - 5 12 Million/uL     Hemoglobin 10 1 (L) 11 5 - 15 4 g/dL     Hematocrit 33 5 (L) 34 8 - 46 1 %     MCV 77 (L) 82 - 98 fL     MCH 23 2 (L) 26 8 - 34 3 pg     MCHC 30 1 (L) 31 4 - 37 4 g/dL     RDW 16 1 (H) 11 6 - 15 1 %     MPV 10 5 8 9 - 12 7 fL     Platelets 393 806 - 386 Thousands/uL     nRBC 0 /100 WBCs     Neutrophils Relative 72 43 - 75 %     Immat GRANS % 0 0 - 2 %     Lymphocytes Relative 17 14 - 44 %     Monocytes Relative 8 4 - 12 %     Eosinophils Relative 2 0 - 6 %     Basophils Relative 1 0 - 1 %     Neutrophils Absolute 5 77 1 85 - 7 62 Thousands/µL     Immature Grans Absolute 0 02 0 00 - 0 20 Thousand/uL     Lymphocytes Absolute 1 36 0 60 - 4 47 Thousands/µL     Monocytes Absolute 0 67 0 17 - 1 22 Thousand/µL     Eosinophils Absolute 0 15 0 00 - 0 61 Thousand/µL     Basophils Absolute 0 04 0 00 - 0 10 Thousands/µL    Basic metabolic panel [[id number]] (Abnormal) Collected: 12/09/18 0519   Lab Status: Final result Specimen: Blood from Arm, Right Updated: 12/09/18 0653    Sodium 134 (L) 136 - 145 mmol/L     Potassium 4 1 3 5 - 5 3 mmol/L     Comment: Slightly Hemolyzed;  Results May be Affected       Chloride 100 100 - 108 mmol/L CO2 29 21 - 32 mmol/L     ANION GAP 5 4 - 13 mmol/L     BUN 14 5 - 25 mg/dL     Creatinine 0 55 (L) 0 60 - 1 30 mg/dL     Comment: Standardized to IDMS reference method       Glucose 78 65 - 140 mg/dL         Calcium 8 9 8 3 - 10 1 mg/dL     eGFR 89 ml/min/1 73sq m        Age/Sex: [de-identified] y o  female     Assessment/Plan:  Assessment:  [de-identified]year old F with left BKA debridement and VAC placement 12/5     Plan:  Continue VAC changes MWF  Discharge planning with outpatient VAC changes  Appreciate nephrology recommendations          Discharge Plan:   [facility]    CASE MANAGEMENT FOLLOWING CLOSELY FOR ALL DISCHARGE NEEDS

## 2018-12-10 NOTE — RESTORATIVE TECHNICIAN NOTE
Restorative Specialist Mobility Note       Pt refused OOB at this time  Pt stated "I am not feeling well " Will continue to follow up  RN Rosibel Woodard Restorative Technician, BS

## 2018-12-10 NOTE — SOCIAL WORK
Pt cleared for d/c today to [facility]  Cm spoke with son Ruth Rosario and would like to have transport scheduled  Cm also spoke with Bhavik Mendes and Harvey in admissions and wound vac has been order and will be applied at facility  CM contacted [facility] transport scheduled for 1530  Pt unsafe to transfer to w/c per therapy and has moderate to severe pain

## 2018-12-10 NOTE — PLAN OF CARE
DISCHARGE PLANNING - CARE MANAGEMENT     Discharge to post-acute care or home with appropriate resources Progressing        Knowledge Deficit     Patient/family/caregiver demonstrates understanding of disease process, treatment plan, medications, and discharge instructions Progressing        MUSCULOSKELETAL - ADULT     Maintain or return mobility to safest level of function Progressing     Maintain proper alignment of affected body part Progressing        Nutrition/Hydration-ADULT     Nutrient/Hydration intake appropriate for improving, restoring or maintaining nutritional needs Progressing        PAIN - ADULT     Verbalizes/displays adequate comfort level or baseline comfort level Progressing        Potential for Falls     Patient will remain free of falls Progressing        Prexisting or High Potential for Compromised Skin Integrity     Skin integrity is maintained or improved Progressing        SAFETY ADULT     Maintain or return to baseline ADL function Progressing     Maintain or return mobility status to optimal level Progressing        SKIN/TISSUE INTEGRITY - ADULT     Skin integrity remains intact Progressing     Incision(s), wounds(s) or drain site(s) healing without S/S of infection Progressing     Oral mucous membranes remain intact Progressing

## 2018-12-10 NOTE — ASSESSMENT & PLAN NOTE
Left BKA site with delayed healing and gangrene    S/P Left BKA revision, washout and VAC placement    Plan:  --Continue local wound care, VAC change done last PM  --Arrange outpatient wound VAC therapy  --Pain control  --Discharge today if arrangements complete

## 2018-12-10 NOTE — ASSESSMENT & PLAN NOTE
Left BKA site with delayed healing and gangrene    S/P Left BKA revision, washout and VAC placement    Plan:  --Continue local wound care  --Wet-to-Dry dressing for discharge and replace VAC at SNF  --Pain control  --PT/OT  --Outpatient follow-up in the [facility] with Dr Ewelina Thomas

## 2018-12-10 NOTE — NURSING NOTE
Patient's discharge instructions faxed and sent with patient  Report called to Astrid HERNANDEZ wound vac removed and wet to dry placed  Patient taken to discharge lounge  Pickup by qian at

## 2018-12-10 NOTE — DISCHARGE SUMMARY
Vascular Surgery    Discharge- Taylor Sanford 1940, [de-identified] y o  female MRN: [id number]    Unit/Bed#: [facility] 506-01 Encounter: [id number]    Primary Care Provider: Vivien Carey MD   Date and time admitted to hospital: 12/5/2018 11:05 AM  Discharge Date: 12/10/2018    Attending: Dr Gian Qureshi    Consultants:   Medicine  Nephrology    Admitting Diagnosis: S/P below knee amputation, left [facility]) [[id number]]    Principle Diagnosis:  * S/P below knee amputation, left ([address] )   Assessment & Plan    Left BKA site with delayed healing and gangrene    S/P Left BKA revision, washout and VAC placement    Plan:  --Continue local wound care  --Wet-to-Dry dressing for discharge and replace VAC at SNF  --Pain control  --PT/OT  --Outpatient follow-up in the Vascular Center with Dr Arcelia Cramer retention   [address]:  --Monitor     Acute kidney injury [facility])   Assessment & Plan    Creatinine improved   UO adequate    Plan:  --Resume Lisinopril as outpatient       Type 2 diabetes mellitus with complication, with long-term current use of insulin [facility])   Assessment & Plan    Lab Results   Component Value Date    HGBA1C 6 6 (H) 12/06/2018       Recent Labs      12/09/18   2055  12/09/18   2152  12/10/18   0713  12/10/18   0743   POCGLU  54*  109  69  83       Blood Sugar Average: Last 72 hrs:  (P) 395 [id number]     Insulin dosages adjusted    Plan:  --Medical follow-up  --Monitor blood glucose       Secondary Diagnosis:  Past Medical History:   Diagnosis Date    Altered mental status     Anemia     Anticoagulated     Xarelto for Afib/ flutter    Asthma     Atrial flutter (HCC)     maintained on anticoag w/ Xarelto    Bradycardia     CAD (coronary artery disease)     Candidiasis     Carotid stenosis, bilateral     Cataract     Chest pain     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) (HCC)     Chronic fatigue syndrome     Chronic low back pain     Chronic ulcer of toes [facility] )     left and right    Concussion w loss of consciousness of unsp duration, init     CVA (cerebral vascular accident) ([facility] ) 4/17/2018    Diabetes mellitus ([place] [address] )     type 2, insulin dependent    DJD (degenerative joint disease)     Expressive aphasia     Foot pain     GERD (gastroesophageal reflux disease)     Herpes zoster     HLD (hyperlipidemia)     HTN (hypertension)     Hypothyroidism     Irritable bowel syndrome     Kidney stone     Lumbar radiculopathy     Lyme disease     Dx in hospital 8/2011    MI (myocardial infarction) ([facility]     Migraines     Muscle spasm     Non-neoplastic nevus     Nontoxic multinodular goiter     [facility]onsustained ventricular tachycardia) [facility]     Osteoarthritis     Other chronic pain     PAD (peripheral artery disease) (HCC)     Palpitations     Paroxysmal atrial fibrillation (HCC)     Pseudogout     Pulmonary hypertension (HCC)     S/P TAVR (transcatheter aortic valve replacement)     Seizures (HCC)     Severe sepsis ([facility] )     Spinal stenosis     Stroke ([place] [address] ) 05/2018    Transient cerebral ischemia     Trigger ring finger     Viral gastroenteritis      Past Surgical History:   Procedure Laterality Date    APPENDECTOMY      ARTERIOGRAM  12/19/2017    CARDIAC CATHETERIZATION      CHOLECYSTECTOMY      COLONOSCOPY      CORONARY ARTERY BYPASS GRAFT  2004    IR ABDOMINAL ANGIOGRAPHY / INTERVENTION  8/10/2018    IR ABDOMINAL ANGIOGRAPHY / INTERVENTION  8/21/2018    LEG AMPUTATION THROUGH LOWER TIBIA AND FIBULA Left 9/17/2018    Procedure: AMPUTATION BELOW KNEE (BKA); Surgeon: Libby Leon MD;  [facility];  Service: Vascular    LUMBAR LAMINECTOMY      ME ECHO TRANSESOPHAG R-T 2D W/PRB IMG ACQUISJ I&R N/A 4/3/2018    Procedure: TRANSESOPHAGEAL ECHOCARDIOGRAM (ROBB);   Surgeon: Silvana Bauer DO;  [facility];  Service: Cardiac Surgery    ME RE-AMPUTATION LOWER LEG Left 12/5/2018    Procedure: Revision of Left AMPUTATION BELOW KNEE (BKA); Surgeon: Ebenezer Ivan MD;  [facility];  Service: Vascular    AL REPLACE AORTIC VALVE OPENFEMORAL ARTERY APPROACH N/A 4/3/2018    Procedure: REPLACEMENT AORTIC VALVE TRANSCATHETER (TAVR) TRANSFEMORAL w/ 26MM IVY YEVGENIY S3 VALVE;  Surgeon: Kaykay Fernandes DO;  [facility];  Service: Cardiac Surgery    AL VEIN BYPASS GRAFT,TIB-PERONEAL Left 9/12/2018    Procedure: Exploration of posterior tibial, medial, and lateral plantar arteries  ;  Surgeon: Wilton Wood MD;  [facility];  Service: Vascular    THYROIDECTOMY      TOTAL ABDOMINAL HYSTERECTOMY W/ BILATERAL SALPINGOOPHORECTOMY      VAC DRESSING APPLICATION  92/7/6070    Procedure: APPLICATION VAC DRESSING;  Surgeon: Ebenezer Ivan MD;  [facility];  Service: Vascular        Procedures Performed: S/P Left BKA revision, washout and VAC placement 12/5    Discharge Medications:  See after visit summary for reconciled discharge medications provided to patient and family  Hospital Course: [de-identified] y/o Female admitted for revision of Left BKA stump wound due to non-healing  She underwent planned revision and wound VAC placement on the day of admission  She tolerated surgery well  Her post-op course was complicated by LEONARD, urinary retention and low urine output  She was seen in consultation by nephrology and was treated with IVF hydration  Her peak creatinine was 1 16  She did require replacement of reis catheter  Prior to discharge her urine output and creatinine improved, the catheter was removed and she was voiding  She was also seen in consultation by medical team  Adjustments were made to her insulin regimen due to low blood sugars  She was approved for discharge to SNF on 12/10   She will continue with wound VAC therapy as an outpatient and follow-up with Dr Sheldon Larkin in the office on 12/27 at 1 PM     Condition at Discharge: stable     Provisions for Follow-Up Care:  See after visit summary for information related to follow-up care and any pertinent home health orders  Disposition: See After Visit Summary for discharge disposition information  Discharge instructions/Information to patient and family:   See after visit summary for information provided to patient and family  Planned Readmission: No    Discharge Statement   I spent 30 minutes discharging the patient  This time was spent on the day of discharge  I had direct contact with the patient on the day of discharge  Additional documentation is required if more than 30 minutes were spent on discharge

## 2018-12-10 NOTE — PROGRESS NOTES
Vascular Surgery  Dorothea Lynch [de-identified] y o  female MRN: [id number]  Unit/Bed#: [facility] 506-01 Encounter: [id number]    V  A C  Procedure Note    Date:  12/09/2018  Time:  8:30 p m  Location of wound:  Left lower extremity BKA wound    Sponges removed:  4 Black Sponges  0 White Sponges    Dimensions of wound:   Medial wound: 3cm x 6cm x 0 5cm  Lateral wound: 3cm x 2cm x 0 5cm    Description of wound:           Sponges placed:  3 Black Sponges  0 White Sponges    VAC settings:  125 mmHg  Continuous    Pt tolerated procedure well  VAC sticker placed to wound dressing, per protocol    A time-out was performed prior to the procedure with the patient's nurse present      Saskia Browning MD  12/9/2018

## 2018-12-10 NOTE — PROGRESS NOTES
Vascular Surgery    Progress Note - Heather Renner 1940, [de-identified] y o  female MRN: [id number]    Unit/Bed#: [facility] 506-01 Encounter: [id number]    Primary Care Provider: Tasneem Vick MD   Date and time admitted to hospital: 12/5/2018 11:05 AM    * S/P below knee amputation, left ([facility] [address] )   Assessment & Plan    Left BKA site with delayed healing and gangrene    S/P Left BKA revision, washout and VAC placement    Plan:  --Continue local wound care, VAC change done last PM  --Arrange outpatient wound VAC therapy  --Pain control  --Discharge today if arrangements complete           Urinary retention   Assessment & Plan    Resolved    Plan:  --Monitor     Acute kidney injury ([facility] )   Assessment & Plan    Creatinine improved   UO adequate    Plan:  --Nephrology following              Subjective:  Pt doing well no events overnight, reis out and voiding    Vitals:  /84 (BP Location: Left arm)   Pulse 67   Temp 98 5 °F (36 9 °C) (Oral)   Resp 18   Ht 5' 4" (1 626 m)   Wt 68 8 kg (151 lb 10 8 oz) Comment: with SCD  LMP  (LMP Unknown)   SpO2 93%   BMI 26 04 kg/m²     I/Os:  I/O last 3 completed shifts: In: 1089 2 [P O :120; I V :969 2]  Out: 1925 [GUPFM:7604]  I/O this shift:   In: 48 [P O :50]  Out: 418 [Urine:418]    Lab Results and Cultures:   Lab Results   Component Value Date    WBC 8 01 12/09/2018    HGB 10 1 (L) 12/09/2018    HCT 33 5 (L) 12/09/2018    MCV 77 (L) 12/09/2018     12/09/2018     Lab Results   Component Value Date    GLUCOSE 104 09/12/2018    CALCIUM 8 9 12/09/2018     10/14/2015    K 4 1 12/09/2018    CO2 29 12/09/2018     12/09/2018    BUN 14 12/09/2018    CREATININE 0 55 (L) 12/09/2018     Lab Results   Component Value Date    INR 1 28 (H) 09/18/2018    INR 1 52 (H) 09/09/2018    INR 1 77 (H) 09/08/2018    PROTIME 16 1 (H) 09/18/2018    PROTIME 18 4 (H) 09/09/2018    PROTIME 20 7 (H) 09/08/2018        Blood Culture:   Lab Results   Component Value Date    BLOODCX No Growth After 5 Days  08/08/2018    BLOODCX No Growth After 5 Days   08/08/2018   ,   Urinalysis:   Lab Results   Component Value Date    COLORU yellow 06/12/2018    COLORU Yellow 05/18/2018    COLORU Yellow 12/21/2016    CLARITYU cloudy 06/12/2018    CLARITYU Clear 05/18/2018    CLARITYU Transparent 12/21/2016    SPECGRAV 1 010 05/18/2018    SPECGRAV 1 010 12/21/2016    PHUR 5 0 05/18/2018    PHUR 8 12/21/2016    LEUKOCYTESUR moderate 06/12/2018    LEUKOCYTESUR Small (A) 05/18/2018    LEUKOCYTESUR - 12/21/2016    NITRITE neg 06/12/2018    NITRITE Negative 05/18/2018    NITRITE - 12/21/2016    PROTEINUA - 12/21/2016    GLUCOSEU neg 06/12/2018    GLUCOSEU Negative 05/18/2018    GLUCOSEU Negative 05/28/2015    KETONESU neg 06/12/2018    KETONESU Negative 05/18/2018    KETONESU - 12/21/2016    BILIRUBINUR neg 06/12/2018    BILIRUBINUR Negative 05/18/2018    BILIRUBINUR Negative 05/28/2015    BLOODU small 06/12/2018    BLOODU Negative 05/18/2018    BLOODU - 12/21/2016   ,   Urine Culture:   Lab Results   Component Value Date    URINECX >100,000 cfu/ml Proteus mirabilis (A) 06/12/2018   ,   Wound Culure:   Lab Results   Component Value Date    WOUNDCULT 4+ Growth of Pseudomonas aeruginosa (A) 09/11/2018    WOUNDCULT 4+ Growth of Klebsiella oxytoca (A) 09/11/2018    WOUNDCULT 4+ Growth of Staphylococcus aureus (A) 09/11/2018       Medications:  Current Facility-Administered Medications   Medication Dose Route Frequency    acetaminophen (TYLENOL) tablet 650 mg  650 mg Oral Q6H PRN    albuterol (PROVENTIL HFA,VENTOLIN HFA) inhaler 2 puff  2 puff Inhalation BID PRN    amiodarone tablet 200 mg  200 mg Oral BID    amLODIPine (NORVASC) tablet 5 mg  5 mg Oral Q12H    atorvastatin (LIPITOR) tablet 80 mg  80 mg Oral Daily With Dinner    bisacodyl (DULCOLAX) EC tablet 10 mg  10 mg Oral Daily    cholecalciferol (VITAMIN D3) tablet 1,000 Units  1,000 Units Oral Daily    docusate sodium (COLACE) capsule 100 mg  100 mg Oral BID    escitalopram (LEXAPRO) tablet 5 mg  5 mg Oral HS    fentaNYL (DURAGESIC) 12 mcg/hr TD 72 hr patch 1 patch  1 patch Transdermal Q72H    fluticasone (FLONASE) 50 mcg/act nasal spray 1 spray  1 spray Nasal Daily    fluticasone (FLOVENT HFA) 44 mcg/act inhaler 1 puff  1 puff Inhalation BID    gabapentin (NEURONTIN) capsule 300 mg  300 mg Oral BID    HYDROmorphone (DILAUDID) injection 0 5 mg  0 5 mg Intravenous Q3H PRN    insulin detemir (LEVEMIR) subcutaneous injection 20 Units  20 Units Subcutaneous Early Morning    insulin lispro (HumaLOG) 100 units/mL subcutaneous injection 1-5 Units  1-5 Units Subcutaneous TID AC    insulin lispro (HumaLOG) 100 units/mL subcutaneous injection 1-5 Units  1-5 Units Subcutaneous HS    levETIRAcetam (KEPPRA) tablet 750 mg  750 mg Oral Daily    levothyroxine tablet 100 mcg  100 mcg Oral Early Morning    methocarbamol (ROBAXIN) tablet 500 mg  500 mg Oral Q6H PRN    naloxone (NARCAN) 0 04 mg/mL syringe 0 04 mg  0 04 mg Intravenous PRN    oxyCODONE (ROXICODONE) IR tablet 5 mg  5 mg Oral Q4H PRN    pantoprazole (PROTONIX) EC tablet 40 mg  40 mg Oral Early Morning    rivaroxaban (XARELTO) tablet 20 mg  20 mg Oral Daily     Physical Exam:    General appearance: alert and oriented, in no acute distress  Lungs: clear to auscultation bilaterally  Heart: regular rate and rhythm, S1, S2 normal, no murmur, click, rub or gallop  Abdomen: soft, non-tender; bowel sounds normal; no masses,  no organomegaly  Extremities: Right foot with stable dry gangrene, +rubor, M/S intact, Left BKA with VAC    Wound/Incision:  Left BKA VAC inplace incision clean, dry, and intact      Dory Suarez PA-C  12/10/2018

## 2018-12-10 NOTE — DISCHARGE INSTRUCTIONS
[facility] Wound VAC: Left BKA stump wound  Change 3x's per week (MWF)  1 Black sponge to wound, 1 bridge  125 mmHg continuous suction  DISCHARGE INSTRUCTIONS  LOWER EXTREMITY AMPUTATION    Following discharge from the hospital, you may have some questions about your operation, your activities or your general condition  These instructions may answer some of your questions and help you adjust during the first few weeks following your operation  REHABILITATION:   All patients require some form of rehabilitation after this procedure  This will assist with your return to daily activities by incorporating exercise and balance training  This may be in the form of visiting nurses and physical therapy in your home  However, admission to a rehabilitation facility is also common for a period of time before you return home  ACTIVITY:  Your limitations for activity will be discussed prior to discharge  Physical therapy and rehab staff will direct progression of your activity based on your progression in the physical therapy  Please follow their recommendations closely  DIET:  Resume your normal diet  Try to eat low fat and low cholesterol foods  INCISION:  You may not include the operated area in a shower until we have given you instructions to do so  Please protect the operated area from moisture when bathing/showering  It is normal to have swelling or discoloration around the incision  If increasing redness or pain develops, call our office immediately  You will have stitches or staples present after your surgery and these will be evaluated at your first post-operative visit  If any of your incisions are open and require dressing changes, you will be given instructions for your daily incision care  If you are not able to change the dressings, a visiting nurse will be arranged      PLEASE CALL THE OFFICE IF YOU HAVE ANY QUESTIONS  [phone] Omar Stone [phone] Itz Bajwa FREE [phone]  [address] [place] [address] [address] [place], [address]  [place] [address], [place], [address]  [address]  [address], [facility], [address]  [address] [facility], [address]    Jacquelyn Neil 62, [address]st Floor, Pier[address]  [facility] 19, [address] [facility], [address] [facility] 97   12[address] [facility], [address] [facility] [facility], [address]  One [facility], [address] [facility], [facility],[address]

## 2018-12-11 ENCOUNTER — TELEPHONE (OUTPATIENT)
Dept: INTERNAL MEDICINE CLINIC | Facility: CLINIC | Age: 78
End: 2018-12-11

## 2018-12-11 ENCOUNTER — TRANSITIONAL CARE MANAGEMENT (OUTPATIENT)
Dept: INTERNAL MEDICINE CLINIC | Facility: CLINIC | Age: 78
End: 2018-12-11

## 2018-12-11 NOTE — TELEPHONE ENCOUNTER
Patient was admitted to [facility] on 12/6/18 with s/p left BKA, urinary retention, DM and LEONARD  She was discharged on 12/10/18 to [address] [place] [address] for rehab  The plan is if she can meet family goals, she will return home  Med list updated with discharge instructions  No TCM at this time

## 2018-12-17 ENCOUNTER — TELEPHONE (OUTPATIENT)
Dept: VASCULAR SURGERY | Facility: CLINIC | Age: 78
End: 2018-12-17

## 2018-12-17 NOTE — TELEPHONE ENCOUNTER
Procedure: Revision of Left AMPUTATION BELOW KNEE (BKA) (Left)  APPLICATION VAC DRESSING    Date of Procedure: 12/5/2018    Surgeon:  Catrachita Sharma MD - Primary     * Sheridan Leos MD - Assisting    Discharge Date: 12/10/2018    Chest pain?: No    Shortness of Breath?: No    Increased pain, swelling, warmth, or redness?: Yes, not warm  *pain with dressing change*    Pus draining from the incision?: None noticed  *Mandy Cisneros reports dry brown hard scab like slough    Foul- smelling drainage?: No    Signs of dehiscence?: No    Fever/Chills?: No    Bleeding?: No    Blood thinners/Antiplatelet?: Xarelto 20 mg QD    Pain Controlled?: Yes      NEXT OFFICE VISIT SCHEDULED:     Vascular: Canceled by Family per [place]- Patient on comfort measures only    Any further questions/concerns?:  Per Mandy Cisneros patient is now on [address]  Patient refused wound vac upon admission to [address] [place] [address] [place]   Wet-dry dressing changes QD & PRN  VSS 98 1 71 20 122/76

## 2019-01-04 DIAGNOSIS — I65.23 OCCLUSION AND STENOSIS OF BILATERAL CAROTID ARTERIES: ICD-10-CM

## 2019-05-12 NOTE — SOCIAL WORK
CM received call from Carey at [facility]  Pt recommended for subacute rehab for slower paced rehab as well as to continue hyperbaric treatments as it is their feeling that HBO should be the priority over acute rehab  Implemented All Universal Safety Interventions:  [place] to call system. Call bell, personal items and telephone within reach. Instruct patient to call for assistance. Room bathroom lighting operational. Non-slip footwear when patient is off stretcher. Physically safe environment: no spills, clutter or unnecessary equipment. Stretcher in lowest position, wheels locked, appropriate side rails in place.

## 2020-01-31 NOTE — TELEPHONE ENCOUNTER
Josefa from vascular surgery ([phone]) called back stating that she contacted Yanni's son with a change in the appt timing  Katalina Tejada will be seen by Dr Corrie Daily on 9/4/18 @ 1600 in the office and then scheduled for surgery at that appt  Number Of Freeze-Thaw Cycles: 3 freeze-thaw cycles Post-Care Instructions: I reviewed with the patient in detail post-care instructions. Patient is to wear sunprotection, and avoid picking at any of the treated lesions. Pt may apply Vaseline to crusted or scabbing areas. Render Note In Bullet Format When Appropriate: No Consent: The patient's consent was obtained including but not limited to risks of crusting, scabbing, blistering, scarring, darker or lighter pigmentary change, recurrence, incomplete removal and infection. Duration Of Freeze Thaw-Cycle (Seconds): 30 Detail Level: Detailed

## 2020-06-10 NOTE — PROGRESS NOTES
Sod dr Brittney Matt branch contacted and questioned about stroke protocol to be started   States no stroke pathway for now Siva Winston  SURGERY  [address] [place] [address]  Phone: [phone]  Fax: [phone]  Follow Up Time:     Yen Mcnulty)  Medicine  Nephrology  [address] [place] [address]  Phone: [phone]  Fax: [phone]  Follow Up Time:     Julian Jaime  CARDIOLOGY  [address] [place] [address]  Phone: [phone]  Fax: [phone]  Follow Up Time:     Santi Palma  CARDIOLOGY  [address] [place] [address]  Phone: [phone]  Fax: [phone]  Follow Up Time:

## 2021-05-18 NOTE — PLAN OF CARE
Problem: Potential for Falls  Goal: Patient will remain free of falls  INTERVENTIONS:  - Assess patient frequently for physical needs  -  Identify cognitive and physical deficits and behaviors that affect risk of falls  -  [place] fall precautions as indicated by assessment   - Educate patient/family on patient safety including physical limitations  - Instruct patient to call for assistance with activity based on assessment  - Modify environment to reduce risk of injury  - Consider OT/PT consult to assist with strengthening/mobility   Outcome: Progressing      Problem: Prexisting or High Potential for Compromised Skin Integrity  Goal: Skin integrity is maintained or improved  INTERVENTIONS:  - Identify patients at risk for skin breakdown  - Assess and monitor skin integrity  - Assess and monitor nutrition and hydration status  - Monitor labs (i e  albumin)  - Assess for incontinence   - Turn and reposition patient  - Assist with mobility/ambulation  - Relieve pressure over bony prominences  - Avoid friction and shearing  - Provide appropriate hygiene as needed including keeping skin clean and dry  - Evaluate need for skin moisturizer/barrier cream  - Collaborate with interdisciplinary team (i e  Nutrition, Rehabilitation, etc )   - Patient/family teaching   Outcome: Progressing      Problem: Nutrition/Hydration-ADULT  Goal: Nutrient/Hydration intake appropriate for improving, restoring or maintaining nutritional needs  Monitor and assess patient's nutrition/hydration status for malnutrition (ex- brittle hair, bruises, dry skin, pale skin and conjunctiva, muscle wasting, smooth red tongue, and disorientation)  Collaborate with interdisciplinary team and initiate plan and interventions as ordered  Monitor patient's weight and dietary intake as ordered or per policy  Utilize nutrition screening tool and intervene per policy   Determine patient's food preferences and provide high-protein, high-caloric foods as appropriate       INTERVENTIONS:  - Monitor oral intake, urinary output, labs, and treatment plans  - Assess nutrition and hydration status and recommend course of action  - Evaluate amount of meals eaten  - Assist patient with eating if necessary   - Allow adequate time for meals  - Recommend/ encourage appropriate diets, oral nutritional supplements, and vitamin/mineral supplements  - Order, calculate, and assess calorie counts as needed  - Recommend, monitor, and adjust tube feedings and TPN/PPN based on assessed needs  - Assess need for intravenous fluids  - Provide specific nutrition/hydration education as appropriate  - Include patient/family/caregiver in decisions related to nutrition   Outcome: Progressing      Problem: DISCHARGE PLANNING - CARE MANAGEMENT  Goal: Discharge to post-acute care or home with appropriate resources  INTERVENTIONS:  - Conduct assessment to determine patient/family and health care team treatment goals, and need for post-acute services based on payer coverage, community resources, and patient preferences, and barriers to discharge  - Address psychosocial, clinical, and financial barriers to discharge as identified in assessment in conjunction with the patient/family and health care team  - Arrange appropriate level of post-acute services according to patient's   needs and preference and payer coverage in collaboration with the physician and health care team  - Communicate with and update the patient/family, physician, and health care team regarding progress on the discharge plan  - Arrange appropriate transportation to post-acute venues   Outcome: Progressing 1 pair

## 2022-02-10 NOTE — PROGRESS NOTES
Clonidine patch #1    benicar 40 mg daily    Hydralazine 50 mg twice daily (cut back if lightheaded or dizzy)    Amlodipine 5 mg daily    bystolic 2 5 mg daily    xarelto 20 mg daily    tiksoyn 500 mcg twice daily Rate for IV mag not stated in order  Clarified with SOD to run over 2 hours  Pt also states that she takes flonase only once a day in the morning  Will reschedule med doses  Will continue to monitor

## 2022-06-27 NOTE — OCCUPATIONAL THERAPY NOTE
[address] Evaluation     Patient Name: Angelo Held Date: 3/17/2018  Problem List  Patient Active Problem List   Diagnosis    PAD [facility]peripheral artery disease) ([facility])    HTN (hypertension)    Aortic valve stenosis, critical    CAD (coronary artery disease)    DM (diabetes mellitus) type II uncontrolled, periph vascular disorder (HCC)    Atrial flutter (HCC)    Hyperlipidemia    GERD (gastroesophageal reflux disease)    Moderate mitral stenosis    Acute combined systolic and diastolic congestive heart failure ([facility] )    URI, acute    Arthritis of hand    Acute pulmonary edema ([facility] )    Asthma     Past Medical History  Past Medical History:   Diagnosis Date    Altered mental status     Anticoagulated     Coumadin for Aflutter    Asthma     Atrial flutter (HCC)     maintained on coumadin anticoag    CAD (coronary artery disease)     Candidiasis     Carotid stenosis, bilateral     Cataract     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) ([facility])     Chronic fatigue syndrome     Chronic low back pain     Concussion w loss of consciousness of unsp duration, init     Coronary artery disease     Diabetes mellitus ([facility] )     type 2, insulin dependent    DJD (degenerative joint disease)     GERD (gastroesophageal reflux disease)     Herpes zoster     HLD (hyperlipidemia)     HTN (hypertension)     Hyperlipidemia     Hypothyroidism     Irritable bowel syndrome     Lumbar radiculopathy     Lyme disease     Dx in hospital 8/2011    Lyme disease     MI (myocardial infarction)     Migraines     Muscle spasm     Non-neoplastic nevus     Nontoxic multinodular goiter     OA (osteoarthritis)     Osteoarthritis     Other chronic pain     PAD (peripheral artery disease) ([facility])     Palpitations     Pseudogout     Spinal stenosis     Transient cerebral ischemia     Trigger ring finger     Viral gastroenteritis      Past Surgical History  Past COMPOUNDED NON-CONTROLLED SUBSTANCE (CMPD RX) - PHARMACY TO MIX COMPOUNDED MEDICATION  Last Written Prescription Date:  *?  Last Fill Quantity: ?,   # refills: ?  Last Office Visit :  2/8/2021  Future Office visit:  None    Routing refill request to provider for review/approval because:  Not on protocol to fill   Refer to clinic for review       Ammy Delarosa RN  Central Triage Red Flags/Med Refills     Surgical History:   Procedure Laterality Date    APPENDECTOMY      ARTERIOGRAM  12/19/2017    CARDIAC CATHETERIZATION      CHOLECYSTECTOMY      COLONOSCOPY      CORONARY ARTERY BYPASS GRAFT  2004    LUMBAR LAMINECTOMY      THYROIDECTOMY      TOTAL ABDOMINAL HYSTERECTOMY W/ BILATERAL SALPINGOOPHORECTOMY             03/17/18 1528   Note Type   Note type Eval only   Restrictions/Precautions   Weight Bearing Precautions Per Order No   Other Precautions Fall Risk   Pain Assessment   Pain Assessment No/denies pain   Pain Score No Pain   Home Living   Type of Home House   Home Layout Two level;Performs ADLs on one level; Able to live on main level with bedroom/bathroom;Stairs to enter with rails  (5 JUSTINO)   Bathroom Shower/Tub Tub/shower unit   Bathroom Toilet Standard   Bathroom Equipment Grab bars in shower; Shower chair;Commode   Bathroom Accessibility Accessible   Home Equipment Walker;Cane  (RW and rollator)   Prior Function   Level of [place] Independent with ADLs and functional mobility   Lives With Alone   Receives Help From Family   ADL Assistance Independent   IADLs Independent   Falls in the last 6 months 0   Vocational Retired   Lifestyle   Autonomy Pt reports she was I with ADLs, IADLs and driving   Reciprocal Relationships Pt has a son and daughter and grandchildren    Today is her son's wedding anniversary and grandson's birthday   Service to Others Pt is retired   Intrinsic Gratification Pt enjoys gardening in the spring time   Psychosocial   Psychosocial (WDL) WDL   Bed Mobility   Additional Comments unable to assess, pt up in bedside chair upon arrival and returned to chair at end of eval   Transfers   Sit to Stand 5  Supervision   Additional items Armrests   Stand to Sit 5  Supervision   Additional items Armrests   Functional Mobility   Functional Mobility 5  Supervision   Additional Comments Pt motivated to walk, able to tolerate ~50 feet with x2 standing rest breaks to catch her breath Additional items Rolling walker   Balance   Static Sitting Good   Dynamic Sitting Fair +   Static Standing Fair +   Dynamic Standing Fair +   Ambulatory Fair +   Activity Tolerance   Activity Tolerance Patient limited by fatigue;Treatment limited secondary to medical complications (Comment)  (shortness of breath)   RUE Assessment   RUE Assessment WFL   LUE Assessment   LUE Assessment WFL   Hand Function   Gross Motor Coordination Functional   Fine Motor Coordination Functional   Cognition   Overall Cognitive Status WFL   Arousal/Participation Alert   Attention Attends with cues to redirect   Orientation Level Oriented X4   Memory Within functional limits   Following Commands Follows one step commands without difficulty   Assessment   Limitation Decreased ADL status; Decreased endurance;Decreased self-care trans;Decreased high-level ADLs   Prognosis Good   Assessment Pt is a 75 y/o female with admission to acute care on 3/15/18 with severe respiratory distress and coughing up pink frothy sputum  Pt with critical aortic valve stenosis with active PAD, HTN, CAD, DM, atrial flutter, hyperlipidemia, GERD, CHF and acute pulmonary edema  Pt is very pleasant lady upon evaluation  She reports no pain at rest sitting up in bedside chair  Pt reports the following home set up: 2 story home with 1st floor set up, 5 JUSTINO with rails, tub shower with grab bars, standard toilet with no grab bars  DME and AD she owns include: RW, cane, rollator, shower chair and bedside commode  Pt reports being I with ADLs, IADLs and driving PTA  She lives alone and has family, son and daughter and grandkids in the area but not available during the daytime when at work  Pt has no reported falls in the last 6 months  She enjoys gardening and other outdoor activities when the weather is nice  Pt was able to complete sit <> stand from bedside chair with Supervision    She used RW to complete functional mobility ~50 feet with supervision with x2 standing rest breaks  She c/o R ankle pain once standing to ambulate  Her family came at end of evaluation and was unable to assess further ADL status and/or bed mobility  Pt returned to bedside chair  Pt demonstrated limited strength, endurance and overall activity tolerance requiring need for AD and rest breaks, not typical and below her baseline  Pt would benefit from further OT evaluation to further assess self cares and functional transfers  At this time from OT perspective pt would benefit from STR vs home with supervision and assist from family with home health therapy services  Pt would benefit from continued acute skilled OT for further evaluation and treatment to meet the below stated goals  Plan   Treatment Interventions ADL retraining; Endurance training;Functional transfer training;Patient/family training;Continued evaluation; Energy conservation; Activityengagement   Goal Expiration Date 03/27/18   OT Frequency 2-3x/wk   Recommendation   OT Discharge Recommendation Short Term Rehab  (STR vs home with supervision with  OT)   Barthel Index   Feeding 10   Bathing 0   Grooming Score 0   Dressing Score 5   Bladder Score 10   Bowels Score 10   Toilet Use Score 5   Transfers (Bed/Chair) Score 10   Mobility (Level Surface) Score 10   Stairs Score 0   Barthel Index Score 60     · Pt will improve activity tolerance to G for min 30 min txment sessions  · Pt will complete ADLs with mod I w/ G hygiene/thoroughness with min cues for cog support  · Pt will complete toileting wtih mod I w/ G hygiene/thoroughness using DME PRN  · Pt will improve fx'l tfers on/off all surfaces using DME PRN with G balance/safety including toileting with mod I  · Pt will improve fx'l mobility with mod I during I/ADl/leisure tasks using DME PRN with G balance/safety  · Pt will demonstrate G carryover of pt/caregiver education and training as appropriate with mod I without cues with G tolerance  · Pt will be 100% attentive during cardiac education and energy conservation techniques to safely engage in ADLs/IADLs s/p sx      Catherine Qualia, OTD, OTR/L

## 2022-08-16 NOTE — SOCIAL WORK
Met with pt at bedside  Pt resides alone in a home with 5 JUSTINO and 1st floor set up  Pt states her son Barb Bailey is his contact and his # is [phone]  Pt states she does not have a POA or living will  Pt is indep at home  Pt states she used [facility] in the past and [facility] home care again since pt has CHF and pt refused  Pt states she does not use any assistive devices  Pts PCP is Dr Bassam Odell and her RX is [facility] in [place]  Pt denies hx of mental illness or substance abuse  Pt states her [facility] will transport her home  CM reviewed d/c planning process including the following: identifying help at home, patient preference for d/c planning needs, Discharge Lounge, [facility] Meds to Bed program, availability of treatment team to discuss questions or concerns patient and/or family may have regarding understanding medications and recognizing signs and symptoms once discharged  CM also encouraged patient to follow up with all recommended appointments after discharge  Patient advised of importance for patient and family to participate in managing patients medical well being  Interpolation Flap Text: A decision was made to reconstruct the defect utilizing an interpolation axial flap and a staged reconstruction.  A telfa template was made of the defect.  This telfa template was then used to outline the interpolation flap.  The donor area for the pedicle flap was then injected with anesthesia.  The flap was excised through the skin and subcutaneous tissue down to the layer of the underlying musculature.  The interpolation flap was carefully excised within this deep plane to maintain its blood supply.  The edges of the donor site were undermined.   The donor site was closed in a primary fashion.  The pedicle was then rotated into position and sutured.  Once the tube was sutured into place, adequate blood supply was confirmed with blanching and refill.  The pedicle was then wrapped with xeroform gauze and dressed appropriately with a telfa and gauze bandage to ensure continued blood supply and protect the attached pedicle.

## 2022-11-26 NOTE — PROGRESS NOTES
Cardiology Progress Note - Susannah Wood [de-identified] y o  female MRN: [id number]    [facility] Encounter: [id number]      Assessment/Plan:    1  NSVT  Started on Amiodarone for suppression  Likely due to ongoing severe electrolyte derangements   magnesium 0 9 this AM, K okay      2  Chronic combined systolic/diastolic HF    EF 54% by echo 5/18 with moderate diffuse hypokinesis and RWMA inferoseptal and inferior walls  RV dilated and reduced function  Echo 4/18 had shown EF 50% after TAVR, but prior to TAVR EF was also 35% by echo 3/18  Here appeared volume overloaded by CXR, versus aspiration PNA  Being treated with cefepime, Flagyl, and Vanco  Also treated with standing IV Lasix to help improve respiratory status and help wean O2 as able   Much more euvolemic and transitioned to PO lasix today  Restart Lisinopril  BP stable and renal function normal     3  CAD- Cardiac catheterization 3/18 showed patent LIMA to LAD, SVG to D1 with 50% stenosis, Y graft to D2,and D3, SVG to OM1 was 100% stenosed, SVG to RPDA was 100% stenosed  Likely LV function decline due to underlying CAD in setting of strain from other comorbidities/acute illnesses  Continue medical management for now, repeat echo when patient recovers from acute illness, as outpt  On high dose metoprolol, ASA, statin      3  Paroxysmal atrial fibrillation  Continue home Metoprolol dose and Xarelto 20  Remains in NSR     4  Severe AS s/p TAVR  On aspirin, statin  Echo- bioprosthesis normal function, no significant perivalvular regurg  Additionally moderate MS and moderate pulmonary hypertension     5  New onset seizure- reason for admission  Brain MRI- small focus of new ischemia  Previously on warfarin, now xarelto        Follows with Dr Brenda Mcfarlane as outpt  Subjective:   Patient seen and examined  No significant events overnight  Some an SVT on telemetry  Asymptomatic      Meds/Allergies   Allergies   Allergen Reactions    Other Chest Pain     IVP-listed as "chest pain" in previous chart, patient stated this occurred "a long time ago" but does not remember exactly occurred     Cephalosporins Chest Pain    Contrast [Iodinated Diagnostic Agents] Other (See Comments)     Flash pulm edema    Doxycycline Chest Pain    Levaquin [Levofloxacin] Chest Pain    Ondansetron Chest Pain     Prolonged QT    Toradol [Ketorolac Tromethamine] Chest Pain       Current Facility-Administered Medications:     acetaminophen (TYLENOL) rectal suppository 650 mg, 650 mg, Rectal, Q4H PRN, Niraj Rankin PA-C    acetaminophen (TYLENOL) tablet 650 mg, 650 mg, Oral, Q6H PRN, Kellie Tyler DO, 650 mg at 04/30/18 0949    albuterol inhalation solution 2 5 mg, 2 5 mg, Nebulization, Q4H PRN, Florentino Lara MD, 2 5 mg at 05/01/18 0918    amiodarone tablet 200 mg, 200 mg, Oral, BID With Meals, Lizeth Freedman MD, 200 mg at 05/05/18 0845    [START ON 5/19/2018] amiodarone tablet 200 mg, 200 mg, Oral, Daily With Breakfast, Lizeth Freedman MD    aspirin chewable tablet 81 mg, 81 mg, Oral, Daily, Valerio Apple DO, 81 mg at 05/05/18 0847    atorvastatin (LIPITOR) tablet 80 mg, 80 mg, Oral, Daily With Wily Abernathy MD, 80 mg at 05/04/18 1845    ferrous sulfate tablet 325 mg, 325 mg, Oral, Daily With Breakfast, Max Jean-Baptiste DO, 325 mg at 05/05/18 0846    fluticasone (FLOVENT HFA) 110 MCG/ACT inhaler 1 puff, 1 puff, Inhalation, Q12H [facility], Garcia Pacheco MD, 1 puff at 05/05/18 0847    furosemide (LASIX) tablet 40 mg, 40 mg, Oral, BID (diuretic), Lizeth Freedman MD, 40 mg at 05/05/18 0846    influenza inactivated quadrivalent vaccine (FLULAVAL) IM injection 0 5 mL, 0 5 mL, Intramuscular, Prior to discharge, Florentino Lara MD    insulin glargine (LANTUS) subcutaneous injection 30 Units 0 3 mL, 30 Units, Subcutaneous, HS, Rayetta Flick    insulin lispro (HumaLOG) 100 units/mL subcutaneous injection 1-6 Units, 1-6 Units, Subcutaneous, TID With Meals, 2 Units at 05/05/18 9246 **AND** Fingerstick Glucose (POCT), , , 4 times day, Dorinda Gil DO    labetalol (NORMODYNE) injection 10 mg, 10 mg, Intravenous, Q6H PRN, Joel Estevez DO, 10 mg at 05/01/18 1625    levETIRAcetam (KEPPRA) tablet 750 mg, 750 mg, Oral, Q12H [facility], Tenisha Pereraena, CRNP, 750 mg at 05/05/18 0846    levothyroxine tablet 100 mcg, 100 mcg, Oral, Daily, Stacy Alexandre MD, 100 mcg at 05/05/18 0553    loratadine (CLARITIN) tablet 10 mg, 10 mg, Oral, Daily, Stacy Alexandre MD, 10 mg at 05/05/18 0845    LORazepam (ATIVAN) 2 mg/mL injection 2 mg, 2 mg, Intravenous, PRN, Joel Estevez DO    magnesium sulfate 2 g/50 mL IVPB (premix) 2 g, 2 g, Intravenous, Once, Sung Lis    magnesium sulfate 4 g/100 mL IVPB (premix) 4 g, 4 g, Intravenous, Once, Sung Lis    metoclopramide (REGLAN) injection 10 mg, 10 mg, Intravenous, Q6H PRN, Stacy Alexandre MD, 10 mg at 04/29/18 0110    metoprolol tartrate (LOPRESSOR) tablet 100 mg, 100 mg, Oral, Q12H [facility], Martita Brock MD, 100 mg at 05/05/18 0846    nystatin (MYCOSTATIN) powder, , Topical, BID, Flaoc Estevez DO    omeprazole (PRILOSEC) suspension 2 mg/mL, 20 mg, Oral, Daily, Flaco Estevez DO, 20 mg at 05/05/18 0900    polyethylene glycol (MIRALAX) packet 17 g, 17 g, Oral, Daily PRN, Wilman Gale MD    potassium chloride (K-DUR,KLOR-CON) CR tablet 40 mEq, 40 mEq, Oral, BID, Martita Brock MD, 40 mEq at 05/05/18 0846    rivaroxaban (XARELTO) tablet 20 mg, 20 mg, Oral, Daily With Dinner, Dorinda Gil DO, 20 mg at 05/04/18 1845    senna-docusate sodium (SENOKOT S) 8 6-50 mg per tablet 1 tablet, 1 tablet, Oral, HS, Ashlie James PA-C, 1 tablet at 05/04/18 2118    Objective   Vitals: Blood pressure 146/86, pulse 86, temperature 98 1 °F (36 7 °C), temperature source Oral, resp   rate 18, height 5' 4" (1 626 m), weight 73 2 kg (161 lb 6 oz), SpO2 98 %, not currently breastfeeding , Body mass index is 27 7 kg/m² , Orthostatic Blood Pressures      Most Recent Value Blood Pressure  146/86 filed at 05/05/2018 7325   Patient Position - Orthostatic VS  Lying filed at 05/05/2018 1704        Systolic (10EFK), NJW:393 , Min:131 , XNO:849     Diastolic (60THI), SXW:20, Min:63, Max:86      Intake/Output Summary (Last 24 hours) at 05/05/18 0904  Last data filed at 05/05/18 0601   Gross per 24 hour   Intake              480 ml   Output             1450 ml   Net             -970 ml     Weight (last 2 days)     Date/Time   Weight    05/05/18 0600  73 2 (161 38)    05/03/18 0600  75 3 (166 01)              Telemetry interrogated-some NSVT, mostly less than 5 beats    Physical Exam:    GEN: Jeanette Moya appears well, alert and oriented x 3, pleasant and cooperative   NECK:  Supple, no JVD  HEART:  Regular, no murmur  LUNGS:  Clear bilaterally, no rales  ABDOMEN:   Soft, nontenderABDOMEN:  EXTREMITIES:   Lb is normal, no edemaEXTREMITIES:      Laboratory Results:    Results from last 7 days  Lab Units 05/01/18  1621 04/28/18  0913   TROPONIN I ng/mL 0 03 0 03       CBC with diff:   Results from last 7 days  Lab Units 05/04/18  1518 05/02/18  0452 05/01/18  1621 05/01/18  0533 05/01/18  0119 04/30/18  1156 04/30/18  0540 04/29/18  1028  04/28/18  2140  04/28/18  0913   WBC Thousand/uL 6 77 8 28 14 78* 7 35  --   --  8 24  8 24 15 93*  --  9 60  --  9 30   HEMOGLOBIN g/dL 9 5* 7 9* 9 0* 8 4* 8 3* 8 1* 7 5*  7 5* 9 9*  --  9 2*  --  9 5*   I STAT HEMOGLOBIN   --   --   --   --   --   --   --   --   < >  --   < >  --    HEMATOCRIT % 32 3* 26 9* 29 9* 28 4*  --  26 1* 24 7*  24 7* 33 4*  --  30 1*  --  30 7*   MCV fL 70* 68* 69* 70*  --   --  68*  68* 70*  --  68*  --  67*   PLATELETS Thousands/uL 277 218 318 239  --   --  210  210 359  --  309  --  315   MCH pg 20 7* 20 0* 20 8* 20 6*  --   --  20 6*  20 6* 20 8*  --  20 9*  --  20 8*   MCHC g/dL 29 4* 29 4* 30 1* 29 6*  --   --  30 4*  30 4* 29 6*  --  30 6*  --  30 9*   RDW % 19 0* 19 0* 19 1* 19 2*  --   --  19 1*  19 1* 19 0*  -- 18 7*  --  18 8*   MPV fL 9 7 9 6 10 1 9 9  --   --  9 6  9 6 10 8  --  10 1  --  10 7   NRBC AUTO /100 WBCs 0 0  --   --   --   --  0  --   --  0  --  0   < > = values in this interval not displayed  CMP:  Results from last 7 days  Lab Units 05/05/18  0551 05/04/18  1518 05/03/18  0458 05/02/18  1804 05/02/18  0045 05/01/18  1757 05/01/18  1621 05/01/18  0513  04/29/18  1029  04/29/18  0450 04/28/18  2140  04/28/18  0913   SODIUM mmol/L 140 142 142  --  143 140 140 144  < > 141  --  139 140  --  141   POTASSIUM mmol/L 4 2 4 0 4 0 4 1 3 3* 4 0 4 3 4 1  < > 3 7  --  3 5 3 6  --  3 1*   CHLORIDE mmol/L 104 103 106  --  107 106 108 111*  < > 106  --  108 107  --  107   CO2 mmol/L 29 31 30  --  28 26 22 26  < > 11*  --  15* 21  --  21   ANION GAP mmol/L 7 8 6  --  8 8 10 7  < > 24*  --  16* 12  --  13   BUN mg/dL 15 12 11  --  11 12 11 13  < > 14  --  12 11  --  9   CREATININE mg/dL 0 78 0 90 0 69  --  0 62 0 85 0 86 0 66  < > 1 13  --  0 82 0 72  --  0 70   GLUCOSE RANDOM mg/dL 197* 292* 167*  --  125 315* 339* 127  < > 204*  --  132 122  --  107   GLUCOSE, ISTAT   --   --   --   --   --   --   --   --   --   --   < >  --   --   < >  --    CALCIUM mg/dL 7 5* 7 5* 7 4*  --  7 1* 7 0* 7 2* 6 7*  < > 6 3*  --  5 9* 6 0*  --  6 2*   AST U/L  --  23  --   --   --  44  --   --   --  43  --  30 28  --  33   ALT U/L  --  21  --   --   --  23  --   --   --  21  --  19 19  --  19   ALK PHOS U/L  --  75  --   --   --  95  --   --   --  73  --  70 72  --  74   TOTAL PROTEIN g/dL  --  6 5  --   --   --  6 8  --   --   --  7 5  --  7 3 7 4  --  7 6   BILIRUBIN TOTAL mg/dL  --  0 55  --   --   --  0 79  --   --   --  0 83  --  0 81 0 83  --  0 67   EGFR ml/min/1 73sq m 72 61 82  --  85 65 64 84  < > 46  --  68 79  < > 82   < > = values in this interval not displayed        BMP:  Results from last 7 days  Lab Units 05/05/18  0551 05/04/18  1518 05/03/18  2740 05/02/18  1804 05/02/18  1228 05/01/18  1757 05/01/18  1624 18  0513   SODIUM mmol/L 140 142 142  --  143 140 140 144   POTASSIUM mmol/L 4 2 4 0 4 0 4 1 3 3* 4 0 4 3 4 1   CHLORIDE mmol/L 104 103 106  --  107 106 108 111*   CO2 mmol/L 29 31 30  --  28 26 22 26   BUN mg/dL 15 12 11  --  11 12 11 13   CREATININE mg/dL 0 78 0 90 0 69  --  0 62 0 85 0 86 0 66   GLUCOSE RANDOM mg/dL 197* 292* 167*  --  125 315* 339* 127   CALCIUM mg/dL 7 5* 7 5* 7 4*  --  7 1* 7 0* 7 2* 6 7*       BNP:  Invalid input(s): NTPROBNP    Magnesium:   Results from last 7 days  Lab Units 18  0551 18  1519 18  1621 18  1026 18  0513 18  0540 18  2212   MAGNESIUM mg/dL 0 9* 1 0* 2 2 1 8 1 7  1 6 2 0 1 6       Coags:   Results from last 7 days  Lab Units 18  1757 18  0542 18  0540 18  1538 18  1028 18  0120 18  2140 18  0913   PTT seconds  --   --  47* 53* 62*  --  51* 50*   INR  1 75* 1 61* 3 23* 6 94* 10 50* 3 75* 4 21* 4 04*       TSH: @LABRCNTIP(TSH:1)    Hemoglobin A1C )      Lipid Profile:       Cardiac testing:   Results for orders placed during the hospital encounter of 18   Echo complete with contrast if indicated    Narrative [address]  [address]  [phone]    Transthoracic Echocardiogram  2D, M-mode, Doppler, and Color Doppler    Study date:  01-May-2018    Patient: Claudia Jessica  MR number: [id number]  Account number: [de-identified]  : 1940  Age: [de-identified] years  Gender: Female  Status: Inpatient  Location: Bedside  Height: 64 in  Weight: 161 7 lb  BP: 171/ 83 mmHg    Indications: Aortic Valve Disorder Evaluate prosthetic aortic valve  Evaluate prosthetic mitral valve      Diagnoses: I35 9 - Nonrheumatic aortic valve disorder, unspecified    Sonographer:  Aidan Andres RDCS  Primary Physician:  Carmen Grijalva MD  Group:  [facility]  Interpreting Physician:  Jose Robb MD    SUMMARY    LEFT VENTRICLE:  The ventricle was mildly dilated  Systolic function was moderately to markedly reduced  Ejection fraction was estimated to be 35 %  There was moderate diffuse hypokinesis with distinct regional wall motion abnormalities  More severe hypokinesis to akinesis was seen in the inferoseptal and inferior walls  Wall thickness was mildly increased  VENTRICULAR SEPTUM:  There was moderate dyssynergic motion  These changes are consistent with LBBB  RIGHT VENTRICLE:  The ventricle was moderately dilated  Systolic function was mildly reduced  LEFT ATRIUM:  The atrium was moderately dilated  RIGHT ATRIUM:  The atrium was moderately dilated  MITRAL VALVE:  There was marked annular calcification  There was moderate diffuse thickening  There was moderately reduced leaflet separation  Transmitral velocity was increased due to valvular stenosis  There was moderate stenosis  There was mild regurgitation  AORTIC VALVE:  A bioprosthesis was present  It exhibited normal function  There was no significant perivalvular regurgitation  Valve mean gradient was 8 mmHg  TRICUSPID VALVE:  There was moderate regurgitation  Pulmonary artery systolic pressure was markedly increased  Estimated peak PA pressure was 70 mmHg  IVC, HEPATIC VEINS:  The inferior vena cava was dilated  HISTORY: PRIOR HISTORY: TAVR, Mitral Stenosis, CABG, MI, HTN, Asthma, CHF, DM, CVA, CAD, HLD    PROCEDURE: The procedure was performed at the bedside  This was a routine study  The transthoracic approach was used  The study included complete 2D imaging, M-mode, complete spectral Doppler, and color Doppler  The heart rate was 100 bpm,  at the start of the study  Images were obtained from the parasternal, apical, subcostal, and suprasternal notch acoustic windows  Echocardiographic views were limited due to restricted patient mobility, poor patient compliance, and lung  interference   This was a technically difficult study     LEFT VENTRICLE: The ventricle was mildly dilated  Systolic function was moderately to markedly reduced  Ejection fraction was estimated to be 35 %  There was moderate diffuse hypokinesis with distinct regional wall motion abnormalities  More severe hypokinesis to akinesis was seen in the inferoseptal and inferior walls  Wall thickness was mildly increased  DOPPLER: The study was not technically sufficient to allow evaluation of LV diastolic function  VENTRICULAR SEPTUM: Thickness was mildly increased  There was moderate dyssynergic motion  These changes are consistent with LBBB  RIGHT VENTRICLE: The ventricle was moderately dilated  Systolic function was mildly reduced  Wall thickness was normal     LEFT ATRIUM: The atrium was moderately dilated  RIGHT ATRIUM: The atrium was moderately dilated  MITRAL VALVE: There was marked annular calcification  There was moderate diffuse thickening  There was moderately reduced leaflet separation  DOPPLER: Transmitral velocity was increased due to valvular stenosis  There was moderate  stenosis  There was mild regurgitation  AORTIC VALVE: A bioprosthesis was present  It exhibited normal function  DOPPLER: Transaortic velocity was within the normal range  There was no evidence for stenosis  There was no significant regurgitation  There was no significant  perivalvular regurgitation  TRICUSPID VALVE: The valve structure was normal  DOPPLER: The transtricuspid velocity was within the normal range  There was moderate regurgitation  Pulmonary artery systolic pressure was markedly increased  Estimated peak PA pressure was  70 mmHg  PULMONIC VALVE: Not well visualized  DOPPLER: The transpulmonic velocity was within the normal range  There was no regurgitation  PERICARDIUM: There was no pericardial effusion  AORTA: The root exhibited normal size  The ascending aorta was normal in size      SYSTEMIC VEINS: IVC: The inferior vena cava was dilated  PULMONARY VEINS: DOPPLER: Doppler signals were not recordable in the pulmonary vein(s)  MEASUREMENT TABLES    DOPPLER MEASUREMENTS  Aortic valve   (Reference normals)  Mean gradient   8 mmHg   (--)    SYSTEM MEASUREMENT TABLES    2D  %FS: 15 07 %  Ao Diam: 2 75 cm  EDV(Teich): 139 25 ml  EF(Teich): 31 62 %  ESV(Teich): 95 22 ml  IVSd: 1 2 cm  LA Area: 23 57 cm2  LA Diam: 4 16 cm  LVEDV MOD A4C: 93 35 ml  LVEF MOD A4C: 41 28 %  LVESV MOD A4C: 54 81 ml  LVIDd: 5 37 cm  LVIDs: 4 56 cm  LVLd A4C: 7 64 cm  LVLs A4C: 6 64 cm  LVOT Diam: 1 9 cm  LVPWd: 1 21 cm  RA Area: 25 62 cm2  RVIDd: 5 25 cm  SV MOD A4C: 38 53 ml  SV(Teich): 44 03 ml    CW  AV Env  Ti: 318 34 ms  AV VTI: 47 91 cm  AV Vmax: 2 36 m/s  AV Vmean: 1 5 m/s  AV maxP 29 mmHg  AV meanPG: 10 48 mmHg  TR Vmax: 3 84 m/s  TR maxP 13 mmHg    MM  TAPSE: 1 4 cm    PW  CHER (VTI): 1 05 cm2  CHER Vmax: 1 03 cm2  E': 0 03 m/s  E/E': 81 41  HR: 173 4 BPM  LVOT Env  Ti: 332 18 ms  LVOT VTI: 17 78 cm  LVOT Vmax: 0 86 m/s  LVOT Vmean: 0 54 m/s  LVOT maxP 95 mmHg  LVOT meanP 37 mmHg  LVSI Dopp: 28 05 ml/m2  LVSV Dopp: 50 2 ml  MV A [place]: 2 32 m/s  MV Dec [place]: 11 07 m/s2  MV DecT: 201 33 ms  MV E [place]: 2 23 m/s  MV E/A Ratio: 0 96  MV PHT: 58 39 ms  MV VTI: 50 85 cm  MV Vmax: 2 29 m/s  MV Vmean: 1 49 m/s  MV maxP 89 mmHg  MV meanPG: 10 01 mmHg  MVA (VTI): 0 99 cm2  MVA By PHT: 3 77 cm2    [employer] Accredited Echocardiography Laboratory    Prepared and electronically signed by    Kenia Downing MD  Signed [id number] 15:08:38       Results for orders placed during the hospital encounter of 18   ROBB    Narrative [address]  [facility], [address] [facility]  [phone]    Transesophageal Echocardiogram  2D, Doppler, and Color Doppler    Study date:  2018    Patient: Brendon Shay  MR number: [id number]  Account number: [de-identified]  : 1940  Age: [de-identified] years  Gender: Female  Status: Inpatient  Location: Echo lab  Height: 64 in  Weight: 167 lb  BP: 116/ 52 mmHg    Indications: CVA    Diagnoses: R55  - Syncope and collapse    Sonographer:  SIOBHAN Fonseca  Primary Physician:  Letitia Joyce MD  Referring Physician:  Abhinav Roberson MD  Group:  [facility]  Interpreting Physician:  Pa Cho MD    SUMMARY    LEFT VENTRICLE:  Systolic function was normal  Ejection fraction was estimated to be 55 %  Although no diagnostic regional wall motion abnormality was identified, this possibility cannot be completely excluded on the basis of this study  Wall thickness was increased  Concentric hypertrophy was present  LEFT ATRIAL APPENDAGE:  No thrombus was identified  ATRIAL SEPTUM:  The septum bows from left to right, consistent with increased left atrial pressure  No defect or patent foramen ovale was identified by color Doppler or after injection of agitated saline  MITRAL VALVE:  There was moderate annular calcification  There was mild to moderate stenosis  Vmax 1 7 m/s, mean gradient 5 6 mm Hg, maximum gradient 11 mm Hg at a HR of 78 BPM   There was mild regurgitation  AORTIC VALVE:  A bioprosthesis was present  It exhibited normal function  TRICUSPID VALVE:  There was moderate to severe regurgitation  Estimated peak PA pressure was 55 mmHg  The findings suggest moderate pulmonary hypertension  HISTORY: PRIOR HISTORY: TAVR, Mitral Stenosis , CABG, MI, Hypertension, Asthma, CHF    PROCEDURE: The procedure was performed in the echo lab  This was a routine study  The risks and alternatives of the procedure were explained to the patient and informed consent was obtained  The transesophageal approach was used  The study  included complete 2D imaging, limited spectral Doppler, and color Doppler  The heart rate was 74 bpm, at the start of the study  An adult omniplane probe was inserted by the attending cardiologist  Intubated with ease   One intubation  attempt(s)  There was no blood detected on the probe  Image quality was adequate  There were no complications during the procedure  MEDICATIONS: Anesthesia administered by anesthesia team     LEFT VENTRICLE: Size was normal  Systolic function was normal  Ejection fraction was estimated to be 55 %  Although no diagnostic regional wall motion abnormality was identified, this possibility cannot be completely excluded on the basis  of this study  Wall thickness was increased  Concentric hypertrophy was present  RIGHT VENTRICLE: The size was normal  Systolic function was normal     LEFT ATRIUM: The atrium was dilated  No thrombus was identified  APPENDAGE: The appendage was dilated  No thrombus was identified  DOPPLER: The function was reduced (reduced emptying velocity)  ATRIAL SEPTUM: The septum bows from left to right, consistent with increased left atrial pressure  No defect or patent foramen ovale was identified by color Doppler or after injection of agitated saline  MITRAL VALVE: There was moderate annular calcification  There was mild-moderate calcification  There was mildly reduced leaflet separation  DOPPLER: There was mild to moderate stenosis  Vmax 1 7 m/s, mean gradient 5 6 mm Hg, maximum  gradient 11 mm Hg at a HR of 78 BPM  There was mild regurgitation  AORTIC VALVE: A bioprosthesis was present  It exhibited normal function  DOPPLER: There was no evidence for stenosis  There was no significant regurgitation  TRICUSPID VALVE: The valve structure was normal  There was normal leaflet separation  There was no echocardiographic evidence of vegetation  DOPPLER: There was moderate to severe regurgitation  Estimated peak PA pressure was 55 mmHg  The  findings suggest moderate pulmonary hypertension  PULMONIC VALVE: Leaflets exhibited normal thickness, no calcification, and normal cuspal separation  There was no echocardiographic evidence of vegetation      PERICARDIUM: There was no pericardial effusion  The pericardium was normal in appearance  AORTA: The root exhibited normal size  There was no atheroma  There was no evidence for dissection  There was no evidence for aneurysm  [address] Accredited Echocardiography Laboratory    Prepared and electronically signed by    Joel Kerns MD  Signed 2018 14:48:57       Results for orders placed during the hospital encounter of 03/15/18   Cardiac catheterization    Narrative [address]  [address] [facility], [address] [facility]  [phone]    [facility]    Invasive Cardiovascular Lab Complete Report    Patient: Earl Roberts  MR number: [id number]  Account number: [de-identified]  Study date: 2018  Gender: Female  : 1940  Height: 64 2 in  Weight: 163 7 lb  BSA: 1 8 m squared    Allergies: OTHER, CEPHALOSPORINS, IODINATED DIAGNOSTIC AGENTS, DOXYCYCLINE, LEVOFLOXACIN, ONDANSETRON, KETOROLAC TROMETHAMINE    Diagnostic Cardiologist:  Lilia Land MD  Primary Physician:  Melba Steinberg MD    SUMMARY    CORONARY CIRCULATION:  Mid LAD: There was a 90 % stenosis  1st diagonal: There was a 100 % stenosis at the ostium of the vessel segment  2nd diagonal: There was a 100 % stenosis at the ostium of the vessel segment  3rd diagonal: There was a 100 % stenosis at the ostium of the vessel segment  1st obtuse marginal: There was a 100 % stenosis at the ostium of the vessel segment  Proximal RCA: There was a 60 % stenosis  The lesion was hazy and ulcerated  It appears amenable to percutaneous intervention  Right PDA: There was a 50 % stenosis  Graft to the 1st diagonal: There was a discrete 50 % stenosis at the proximal anastomosis  It appears amenable to percutaneous intervention  Graft to the 1st obtuse marginal: There was a 100 % stenosis at the proximal anastomosis  Graft to the RPDA: There was a 100 % stenosis at the proximal anastomosis      INDICATIONS:  --  Possible CAD: myocardial infarction without ST elevation (NSTEMI)  PROCEDURES PERFORMED    --  Left coronary angiography  --  Right coronary angiography  --  Saphenous vein graft angiography  --  LIMA graft angiography  --  Inpatient  --  Mod Sedation Same Physician Initial 15min  --  Mod Sedation Same Physician Add 15min  --  Mod Sedation Same Physician Add 15min  --  Mod Sedation Same Physician Add 15min  --  Coronary Catheterization (w/o LHC, w/ Grafts  PROCEDURE: The risks and alternatives of the procedures and conscious sedation were explained to the patient and informed consent was obtained  The patient was brought to the cath lab and placed on the table  The planned puncture sites  were prepped and draped in the usual sterile fashion  --  Right femoral artery access  The puncture site was infiltrated with local anesthetic  The vessel was accessed using the modified Seldinger technique, a wire was advanced into the vessel, and a sheath was advanced over the wire into the  vessel  --  Left coronary artery angiography  A catheter was advanced over a guidewire into the aorta and positioned in the left coronary artery ostium under fluoroscopic guidance  Angiography was performed  --  Right coronary artery angiography  A catheter was advanced over a guidewire into the aorta and positioned in the right coronary artery ostium under fluoroscopic guidance  Angiography was performed  --  Saphenous vein graft angiography  A catheter was advanced to the aorta and positioned at the aortic anastomosis of the graft over a guidewire under fluoroscopic guidance  Angiography was performed  --  Left internal mammary graft angiography  A catheter was advanced to the aorta and positioned in the left subclavian artery over a guidewire under fluoroscopic guidance  Angiography was performed  --  Inpatient  --  Mod Sedation Same Physician Initial 15min  --  Mod Sedation Same Physician Add 15min      --  Mod Sedation Same Physician Add 15min  --  Mod Sedation Same Physician Add 15min  --  Coronary Catheterization (w/o LHC, w/ Grafts  PROCEDURE COMPLETION: The patient tolerated the procedure well and was discharged from the cath lab  TIMING: Test started at 12:57  Test concluded at 13:55  HEMOSTASIS: The sheath was removed  The site was compressed manually  Hemostasis  was obtained  MEDICATIONS GIVEN: Benadryl (50mg/ml), 50 mg, IV, at 12:54  Hydrocortisone 100mg/ml, 100 mg, IV, at 12:54  Fentanyl (1OOmcg/2 ml), 50 mcg, IV, at 12:55  Versed (2mg/2ml), 1 mg, IV, at 12:55  1% Lidocaine, 10 ml,  subcutaneously, at 12:58  Versed (2mg/2ml), 0 5 mg, IV, at 13:00  Fentanyl (1OOmcg/2 ml), 25 mcg, IV, at 13:00  Versed (2mg/2ml), 0 5 mg, IV, at 13:30  Fentanyl (1OOmcg/2 ml), 25 mcg, IV, at 13:30  Versed (2mg/2ml), 1 mg, IV, at 13:35  Fentanyl (1OOmcg/2 ml), 50 mcg, IV, at 13:35  CONTRAST GIVEN: 100 ml Omnipaque (350mg I /ml)  100 ml Omnipaque (350mg I /ml)  RADIATION EXPOSURE: Fluoroscopy time: 26 75 min  CORONARY VESSELS:   --  The coronary circulation is right dominant  --  Left main: The vessel was medium to large sized  Angiography showed minor luminal irregularities  --  Mid LAD: There was a 90 % stenosis  --  Distal LAD: The vessel was medium to large sized  The artery was supplied by a patent bypass graft  --  1st diagonal: There was a 100 % stenosis at the ostium of the vessel segment  --  2nd diagonal: The vessel was small to medium sized and mildly ectatic  Angiography showed moderate atherosclerosis  There was a 100 % stenosis at the ostium of the vessel segment  --  3rd diagonal: The vessel was small sized  The artery was supplied by a patent bypass graft  There was a 100 % stenosis at the ostium of the vessel segment  --  1st obtuse marginal: There was a 100 % stenosis at the ostium of the vessel segment  --  Proximal RCA: There was a 60 % stenosis  The lesion was hazy and ulcerated   It appears amenable to percutaneous intervention  --  Right PDA: There was a 50 % stenosis  --  Graft to the distal LAD: The graft was a small to medium sized LIMA  --  Graft to the 1st diagonal: The graft was a medium to large sized saphenous vein graft from the aorta  Graft angiography showed moderate atherosclerosis and mild degeneration  There was a discrete 50 % stenosis at the proximal  anastomosis  It appears amenable to percutaneous intervention  --  Graft to the 2nd diagonal: The graft was a medium to large sized saphenous vein graft from first diagonal   --  Graft to the 3rd diagonal: The graft was a saphenous vein graft from second diagonal   --  Graft to the 1st obtuse marginal: The graft was a saphenous vein graft from the aorta  There was a 100 % stenosis at the proximal anastomosis  --  Graft to the RPDA: The graft was a saphenous vein graft from the aorta  There was a 100 % stenosis at the proximal anastomosis  IMPRESSIONS:  There is significant triple vessel coronary artery disease  Patent LIMA-LAD, patent SVG D1-2-3/sequential(mild degeneration)  occluded(OLD) SVG om1 and SVG PDA    RECOMMENDATIONS  The patient should continue with the present medications  Consultation be obtained for aortic valve replacement  Prepared and signed by    Saundra Russ MD  Signed 03/19/2018 14:06:04    Study diagram    Angiographic findings  Native coronary lesions:  ·Mid LAD: Lesion 1: 90 % stenosis  ·D1: Lesion 1: 100 % stenosis  ·D2: Lesion 1: 100 % stenosis  ·D3: Lesion 1: 100 % stenosis  ·OM1: Lesion 1: 100 % stenosis  ·Proximal RCA: Lesion 1: 60 % stenosis  ·RPDA: Lesion 1: 50 % stenosis  Coronary graft lesions:  ·Graft to D1: SVG  · 50 % stenosis at proximal anastomosis, discrete  ·Graft to OM1: SVG  · 100 % stenosis at proximal anastomosis  ·Graft to RPDA: SVG  · 100 % stenosis at proximal anastomosis      Hemodynamic tables    Pressures:  Baseline  Pressures:  - HR: 78  Pressures:  - Rhythm:  Pressures:  -- Aortic Pressure (S/D/M): 145/71/100    Outputs:  Baseline  Outputs:  -- CALCULATIONS: [age]  Outputs:  -- CALCULATIONS: Body Surface Area: 1 80  Outputs:  -- CALCULATIONS: Height in cm: 163 00  Outputs:  -- CALCULATIONS: Sex: Female  Outputs:  -- CALCULATIONS: Weight in k 40             Kenyetta Michaels MD    Portions of the record may have been created with voice recognition software   Occasional wrong word or "sound a like" substitutions may have occurred due to the inherent limitations of voice recognition software   Read the chart carefully and recognize, using context, where substitutions have occurred  Yes - the patient is able to be screened

## 2023-01-01 NOTE — PHYSICAL THERAPY NOTE
08/22/18 0958   Pain Assessment   Pain Assessment 0-10   Pain Score 7   Pain Type Chronic pain;Acute pain   Pain Location Foot   Pain Orientation Bilateral   Pain Frequency Constant/continuous   Hospital Pain Intervention(s) Rest   Restrictions/Precautions   RLE Weight Bearing Per Order WBAT   LLE Weight Bearing Per Order WBAT   Braces or Orthoses (post op shoes when ambulating)   Other Precautions Fall Risk   General   Chart Reviewed Yes   Family/Caregiver Present No   Cognition   Overall Cognitive Status WFL   Subjective   Subjective Pt states being frustrated about her feet  Bed Mobility   Rolling R 6  Modified independent   Rolling L 6  Modified independent   Supine to Sit 6  Modified independent   Sit to Supine 6  Modified independent   Transfers   Sit to Stand 5  Supervision   Stand to Sit 5  Supervision   Stand pivot 5  Supervision   Ambulation/Elevation   Gait pattern Decreased foot clearance; Antalgic; Step to;Excessively slow   Gait Assistance 5  Supervision   Assistive Device Rolling walker   Distance 30   Stair Management Assistance Not tested   Balance   Static Sitting Normal   Dynamic Sitting Good   Static Standing Fair   Dynamic Standing Fair   Ambulatory Fair   Endurance Deficit   Endurance Deficit Yes   Activity Tolerance   Activity Tolerance Patient limited by pain   Exercises   Hip Flexion Sitting;20 reps;AROM; Bilateral   Knee AROM Long Arc Quad Sitting;20 reps;AROM; Bilateral   Ankle Pumps Sitting;25 reps;AROM; Bilateral   Assessment   Assessment Pt continues with pain of B feet, limiting activity and standing tolerance  Decision for possible amputation has not been decided at this time  Pt reporting she would return home to her home with 1st floor set up or possibly go to her daughters  Pt continues to function with RW at close supervision level within confines of room  Goals   STG Expiration Date 08/29/18   Short Term Goal #1 Previous goals remain appropriate, will extend date  Recommendation   Recommendation Home with family support;Home PT   Equipment Recommended Hassan Shone unknown

## 2023-04-15 NOTE — ED ATTENDING ATTESTATION
Dianelys Apple MD, saw and evaluated the patient  I have discussed the patient with the resident/non-physician practitioner and agree with the resident's/non-physician practitioner's findings, Plan of Care, and MDM as documented in the resident's/non-physician practitioner's note, except where noted  All available labs and Radiology studies were reviewed  At this point I agree with the current assessment done in the Emergency Department  I have conducted an independent evaluation of this patient a history and physical is as follows:Feels weak with malaise  Nausea and generalized abdominal pain  Exam non focal  Abdomen tender   Recent CVA switched from coumadin to [address]    Procedures
98.3

## 2024-02-11 NOTE — TELEPHONE ENCOUNTER
Checked in pt  Chart Xarelto 20mg daily was updated 
Could someone update the patient's medication list as per [facility]  Thank you 
Mello Sutherland from Dr Michael Camarillo office called to tell us that he switched the patient's warfarin to Xarelto, unknown dose 
Noted    Please clarify dose and update pt med list 
Ordering physician's office just updated the [facility] and the Xarelto dose is 20 mg p o  daily 
IV intact

## 2024-10-18 NOTE — H&P
Please see progress note on 5/34/2018 at 12:14 by Dr Des Contreras for full details of history and physical     Tuyet Donahue DISCHARGE

## 2024-11-12 NOTE — ANESTHESIA POSTPROCEDURE EVALUATION
Post-Op Assessment Note      CV Status:  Stable    Mental Status:  Awake    Hydration Status:  Euvolemic    PONV Controlled:  Controlled    Airway Patency:  Patent    Post Op Vitals Reviewed: Yes          Staff: CRNA           BP   142/70   Temp      Pulse  80a-fib   Resp   22   SpO2   100 on 4L 1 Principal Discharge DX:	Acute UTI

## 2025-01-14 NOTE — PROGRESS NOTES
Assessment  1  Atherosclerosis of artery of extremity with ulceration (440 23) (I70 299,L97 909)   2  Diabetes mellitus type 2, uncontrolled (250 02) (E11 65)   3  Asymptomatic carotid artery stenosis, bilateral (433 10,433 30) (I65 23)   4  Never a smoker   5  History of kidney stones (V13 01) (X03 623)    Plan  Atherosclerosis of artery of extremity with ulceration    · Benadryl Allergy 25 MG Oral Tablet; take 2 tabs 1 hour prior to contrast   Rx By: Doctor, [facility]; Dispense: 0 Days ; #:2 Tablet; Refill: 0;Atherosclerosis of artery of extremity with ulceration; FARHAD = N; Verified Transmission to [facility]/PHARMACY #1234 Msg to Pharmacy: prep for angiogram; Last Updated By: System, [employer]; 11/16/2017 2:57:03 PM   · MethylPREDNISolone 32 MG Oral Tablet; 32mg po 12 hours prior to contrast [id number] po 2 hours prior to contast   Rx By: Doctor, [facility]; Dispense: 0 Days ; #:2 Tablet; Refill: 0;For: Atherosclerosis of artery of extremity with ulceration; FARHAD = N; Verified Transmission to [facility]/PHARMACY #9260 Msg to Pharmacy: prior to angiogram; Last Updated By: [employer]; 11/16/2017 2:57:01 PM   · (1) BASIC METABOLIC PROFILE; Status:Active; Requested for:16Nov2017;    Perform:[facility]; [id number]:[id number]; Ordered; For:Atherosclerosis of artery of extremity with ulceration; Ordered By:Aimee Monsivais;   · (1) CBC/ PLT (NO DIFF); Status:Active; Requested for:16Nov2017;    Perform:[facility]; [id number]; Ordered; For:Atherosclerosis of artery of extremity with ulceration; Ordered By:Aimee Monsivais;   · (1) PT WITH INR; Status:Active; Requested for:16Nov2017;    Perform:[facility]; [id number]:[id number]; Ordered;of artery of extremity with ulceration; Ordered By:Doctor [facility];   · Schedule Surgery Treatment  Procedure  Status: Hold For - Scheduling  Requested for:16Nov2017   Ordered; Atherosclerosis of artery of extremity with ulceration; Ordered By: Doctor [facility] Performed:  Due: 13WNJ3338; Last Updated By: Meliza Adam; 11/16/2017 3:15:51 PM    Discussion/Summary  Discussion Summary:   Patient is a 77 yo F w/ HTN, HLD, diastolic and systolic CHF, aflutter on coumadin, CAD, Aortic stenosis, DM, OA, arthritis, DJD, GERD, hypothyroid, migraine, PAD w/ nonhealing L medial great toe woundAtherosclerosis with nonhealing ulcerationgreat toe wound since at least Aug (not sure when it started); 2mm in sizeLEADs which shows diffuse disease without focal stenosis and R: NC/65/33 and L: NC/51/34PAD and DM and role in wound healing; at this point, wound care trial has failed and I have recommended intervention with angiogram; discussed risks and benefitsfor LLE angiogram with right femoral accesswill prep for dye allergy (pt unsure when or what happened)B asymptomatic ICA stenosisDU which shows <50% B ICA stenosishx of TIA/CVAsurveillance study in 1 year (order next visit)MedicationsASA/statin; on coumadin for aflutter  Counseling Documentation With Imm: The patient was counseled regarding diagnostic results,-- instructions for management,-- prognosis,-- risks and benefits of treatment options,-- importance of compliance with treatment  Chief Complaint  Chief Complaint Free Text Note Form: I am here to have my wound checked  has not had any new testing  She has a 2mm x 2mm wound on her left lateral helix  She denies any pain or draining to the wound  She denies any numbness or tingling in her feet  History of Present Illness  HPI: Patient is a 77 yo F w/ HTN, HLD, diastolic and systolic CHF, aflutter on coumadin, CAD, Aortic stenosis, DM, OA, arthritis, DJD, GERD, hypothyroid, migraine, PAD w/ nonhealing L medial great toe wound  is a poor historian  She does not know when this wound occurred or how but it was definitely present in august when she was first seen by vascular  She is keeping it clean and dry  She sees podiatry  She does have some leg cramping with ambulation   She has to stop multiple times in the grocery store and rest  Her ambulation is also limited by SOB  She denies prior hx of wounds  Denies rest pain  No prior interventions on her legs  has an IVP allergy which is listed as chest pain in her chart  She does not remember when this occurred or what reaction she had, except that it was a long time ago      Review of Systems  Complete Female - Vasc:  Constitutional: No fever or chills, feels well, no tiredness, no recent weight gain or weight loss  Eyes: No sudden vision loss, no blurred vision, no double vision  ENT: no loss of hearing, no nosebleeds, no hoarseness  Cardiovascular: intermittent leg claudication, but-- no chest pain  Respiratory: shortness of breath-- and-- shortness of breath during exertion  Gastrointestinal: No nausea, No vomiting, no diarrhea, no blood in stool  Genitourinary: no dysuria, no Hematuria,no urinary incontinence  Musculoskeletal: no limb pain, no limb swelling-- and-- no limb pain  Integumentary: dry skin-- and-- skin wound, but-- no rashes,-- no itching,-- no skin lesions-- and-- ulcers  Neurological: difficulty walking, but-- no limb weakness  Psychiatric: no depression, no mood disorders, no anxiety  Hematologic/Lymphatic: no bleeding disorder, no easy bruising  ROS Reviewed:   ROS reviewed  Active Problems    1  Acute combined systolic and diastolic congestive heart failure (428 41,428 0) (I50 41)   2  Arthritis of hand (716 94) (M19 049)   3  At high risk for injury related to fall (V49 89) (Z91 89)   4  Atherosclerosis of artery of extremity with ulceration (440 23) (I70 299,L97 909)   5  Atherosclerosis of native artery of left lower extremity with rest pain (440 22) (I70 222)   6  Atherosclerotic heart disease of native coronary artery without angina pectoris (414 01) (I25 10)   7  Atrial flutter (427 32) (I48 92)   8  Benign essential HTN (401 1) (I10)   9  Current use of long term anticoagulation (V58 61) (Z79 01)   10  Degenerative joint disease involving multiple joints (715 89) (M15 9)   11  Diabetes mellitus type 2, uncontrolled (250 02) (E11 65)   12  Diabetic retinopathy associated with diabetes mellitus of other type (250 50,362 01)  (E13 319)   13  Gastroesophageal reflux disease (530 81) (K21 9)   14  Hypercholesterolemia (272 0) (E78 00)   15  Hyperkalemia (276 7) (E87 5)   16  Hypocalcemia (275 41) (E83 51)   17  Hypokalemia (276 8) (E87 6)   18  Hypomagnesemia (275 2) (E83 42)   19  Hypothyroidism (244 9) (E03 9)   20  LBBB (left bundle branch block) (426 3) (I44 7)   21  Migraine headache (346 90) (G43 909)   22  Minor head trauma (959 01) (S00 90XA)   23  Nephrolithiasis (592 0) (N20 0)   24  Nonintractable episodic headache, unspecified headache type (784 0) (R51)   25  Nonrheumatic aortic valve stenosis (424 1) (I35 0)   26  Osteoarthritis of both knees, unspecified osteoarthritis type (715 96) (M17 0)   27  Pain of left sacroiliac joint (724 6) (M53 3)   28  Peripheral arterial disease (443 9) (I73 9)   29  Precordial pain (786 51) (R07 2)   30  PVD (peripheral vascular disease) (443 9) (I73 9)   31  ST elevation myocardial infarction (STEMI) (410 90) (I21 3)   32  Subsequent non-ST elevation (NSTEMI) myocardial infarction (410 70) (I22 2)   33  Ulcer of toe of left foot (707 15) (L97 529)    Past Medical History    1  History of Abscess of groin (682 2) (L02 214)   2  History of Acute bronchitis (466 0) (J20 9)   3  History of Acute shoulder pain, right (719 41) (M25 511)   4  History of Acute upper respiratory infection (465 9) (J06 9)   5  History of Asthma (493 90) (J45 909)   6  History of Asthma with acute exacerbation (493 92) (J45 901)   7  History of Burn of shoulder, left, first degree, initial encounter   8  Denied: History of Carrier Of STD   9  History of Chest pain on breathing (786 52) (R07 1)   10  History of Chronic low back pain (724 2,338 29) (M54 5,G55 29)   11   History of Concussion With Loss Of Consciousness Of Unspecified Duration (850 5)   12  History of Coronary Artery Disease (V12 59)   13  History of Depression screen (V79 0) (Z13 89)   14  History of Diabetes Mellitus (250 00)   15  History of Diabetes mellitus type 2, uncontrolled (250 02) (E11 65)   16  History of Diabetes mellitus type 2, uncontrolled (250 02) (E11 65)   17  History of Diabetic Peripheral Neuropathy (357 2)   18  History of Dyspnea on exertion (786 09) (R06 09)   19  History of Encounter for screening mammogram for malignant neoplasm of breast  (V76 12) (Z12 31)   20  Encounter for special screening examination for genitourinary disorder (V81 6) (Z13 89)   21  History of Fall from other slipping, tripping, or stumbling (E885 9) (W01  0XXA)   22  History of Flank pain (789 09) (R10 9)   23  History of Foreign Body In The Right Ear (931)   24  History of Functional murmur (R01 0)   25  History of Gross hematuria (599 71) (R31 0)   26  History of Hand pain, right (729 5) (M79 641)   27  History of Hand pain, unspecified laterality   28  History of abdominal pain (V13 89) (Z87 898)   29  History of acute bronchitis (V12 69) (Z87 09)   30  History of allergy (V15 09) (Z88 9)   31  History of asthma (V12 69) (Z87 09)   32  History of candidiasis (V12 09) (Z86 19)   33  History of cataract (V12 49) (Z86 69)   34  History of chronic fatigue syndrome (V13 89) (Z87 898)   35  History of herpes zoster (V12 09) (Z86 19)   36  History of hyperkalemia (V12 29) (Z86 39)   37  History of hyperlipidemia (V12 29) (Z86 39)   38  History of hypertension (V12 59) (Z86 79)   39  History of kidney stones (V13 01) (Z87 442)   40  History of Lyme disease (V12 09) (Z86 19)   41  History of nausea and vomiting (V12 79) (Z87 898)   42  History of pseudogout (V13 59) (Z87 39)   43  History of renal calculi (V13 01) (Z87 442)   44  History of spinal stenosis (V13 59) (Z87 39)   45  History of transient cerebral ischemia (V12 54) (Z86 73)   46   History of viral gastroenteritis (V12 09) (Z86 19)   47  History of Hypomagnesemia (275 2) (E83 42)   48  History of Irritable bowel syndrome (564 1) (K58 9)   49  History of Joint Pain In Both Knees (719 46)   50  History of Left hip pain (719 45) (M25 552)   51  History of Limb pain (729 5) (M79 609)   52  History of Lumbar radiculopathy (724 4) (M54 16)   53  History of Lyme disease (088 81) (A69 20)   54  Denied: History of Mental Status Change   55  History of Muscle spasm (728 85) (M62 838)   56  History of Neck pain (723 1) (M54 2)   57  History of Neck sprain, initial encounter (847 0) (S13 9XXA)   58  History of Non-neoplastic nevus (448 1) (I78 1)   59  History of Non-toxic multinodular goiter (241 1) (E04 2)   60  History of Osteoarthritis (715 90) (M19 90)   61  History of Other chronic pain (338 29) (G89 29)   62  History of Other nonspecific abnormal finding of lung field (793 19) (R91 8)   63  History of Pain and swelling of left lower leg (729 5,729 81) (M79 662,M79 89)   64  History of Palpitations (785 1) (R00 2)   65  History of Palpitations (785 1) (R00 2)   66  History of PPD screening test (V74 1) (Z11 1)   67  History of Reported Prior Kidney Disease (V13 00)   68  History of Soft Tissue Pain In Toes (729 5)   69  History of Strain of knee, left, initial encounter (844 9) (S86 912A)   70  History of Stroke Syndrome (436)   71  History of Toe fracture, left (826 0) (S92 912A)   72  History of Toe pain (729 5) (M79 676)   73  History of Trigger ring finger (727 03) (M65 349)   74  History of Type 2 diabetes mellitus (250 00) (E11 9)   75  History of Type 2 diabetes mellitus with hyperglycemia (250 00) (E11 65)   76  History of Urinary Symptoms (788 69)   77  History of UTI (urinary tract infection) (599 0) (N39 0)   78  History of UTI (urinary tract infection) (599 0) (N39 0)   79  History of UTI (urinary tract infection) (599 0) (N39 0)  Active Problems And Past Medical History Reviewed:    The active problems and past medical history were reviewed and updated today  Surgical History  1  History of Appendectomy   2  History of Cholecystectomy   3  History of Complete Colonoscopy   4  History of Coronary Artery Four Or More Arterial Bypass Grafts   5  History of Diagnostic Esophagogastroduodenoscopy   6  History of Gallbladder Surgery   7  History of Gallbladder Surgery   8  History of Hysterectomy   9  History of Laminectomy Lumbar   10  History of Thyroid Surgery Total Thyroidectomy  Surgical History Reviewed: The surgical history was reviewed and updated today  Family History  Mother    1  Family history of cancer (V16 9) (Z80 9)   2  Family history of Stroke Syndrome (V17 1)  Father    3  Family history of cancer (V16 9) (Z80 9)   4  Family history of heart disease (V17 49) (Z82 49)  Daughter    5  Family history of type 2 diabetes mellitus (V18 0) (Z83 3)  Sister    6  Family history of Breast Cancer (V16 3)  Family History    7  Family history of Family Health Status 5  Children Living  Family History Reviewed: The family history was reviewed and updated today  Social History     · Denied: History of Alcohol Use (History)   · Daily Coffee Consumption (1  Cups/Day)   · Daily Cola Consumption (1  Cans/Day)   · Daily Tea Consumption (1  Cups/Day)   · Denied: History of Drug Use   · Marital History -    · Never a smoker   · Occupation: Retired  Social History Reviewed: The social history was reviewed and updated today  The social history was reviewed and is unchanged  Current Meds   1  Asmanex 30 Metered Doses 220 MCG/INH Inhalation Aerosol Powder Breath Activated; INHALE 1 PUFFS Daily; Therapy: 08IGL1934 to (Last Rx:20Oct2016)  Requested for: 21Oct2016 Ordered   2  Aspirin 81 MG Oral Tablet Delayed Release; TAKE 1 TABLET DAILY; Therapy: (Recorded:78Gym6702) to Recorded   3  Atorvastatin Calcium 80 MG Oral Tablet;  Take 1 tablet daily  Requested for: 66GBU3207; Last Rx:26Oct2017 Ordered   4  Yesenia Contour Monitor w/Device Kit; USE AS DIRECTED; Therapy: 25Mlu7618 to (Last Allyne Profit)  Requested for: 60Adt0020 Ordered   5  Yesenia Contour Test In [employer]; TEST 4 TIMES DAILY; Therapy: 33SID6126 to (78 603 806)  Requested for: 26Oct2017; Last Rx:26Oct2017 Ordered   6  BD Insulin Syringe Ultrafine 31G X 5/16 1 ML Miscellaneous; USE AS DIRECTED  Requested for: 65XGK2060; Last Rx:09Tuy4818 Ordered   7  Esomeprazole Magnesium 20 MG Oral Capsule Delayed Release; TAKE 1 CAPSULE ONCE DAILY; Therapy: 91GUW1719 to (Evaluate:24Apr2018)  Requested for: 26Oct2017; Last Rx:26Oct2017 Ordered   8  Furosemide 40 MG Oral Tablet; Take 1 tablet daily  Requested for: 11EBX7687; Last Rx:26Oct2017 Ordered   9  Lancets Ultra Fine Miscellaneous; test glucose three times a day before each meal; Therapy: 79ZAE8828 to (Last Rx:02Nov2017)  Requested for: 43GLY7505 Ordered   10  Lantus 100 UNIT/ML Subcutaneous Solution; Inject 30 in am and 30 q pm  units  subcutaneously; Therapy: 86GVF7401 to (Last Rx:26Oct2017)  Requested for: 26Oct2017 Ordered   11  Levothyroxine Sodium 100 MCG Oral Tablet; Take 1 tablet daily  Requested for:  08ANL7298; Last Rx:18Oct2017 Ordered   12  Lisinopril 5 MG Oral Tablet; Take 1 tablet daily  Requested for: 92SST4521; Last  Rx:26Oct2017 Ordered   13  Magnesium Oxide 400 MG Oral Capsule; Take 1 capsule twice daily; Therapy: 34Wvl4965 to (Last Rx:02Jun2017)  Requested for: 02Jun2017 Ordered   14  MetFORMIN HCl - 1000 MG Oral Tablet; than one bid; Therapy: 81FKX6592 to (Evaluate:24Apr2018)  Requested for: 26Oct2017; Last  Rx:26Oct2017 Ordered   15  Metoprolol Succinate  MG Oral Tablet Extended Release 24 Hour; TAKE 1 TABLET  Bedtime  Requested for: 02RWE2126; Last Rx:17Mar2017 Ordered   16  Tums CHEW; PRN; Therapy: (Recorded:69Jnl3752) to Recorded   17  Tylenol 325 MG Oral Tablet; TAKE 1 TO 2 TABLETS EVERY 4 HOURS AS NEEDED; Therapy: (Recorded:32Ewn5992) to Recorded   18  Ventolin  (90 Base) MCG/ACT Inhalation Aerosol Solution; INHALE 1-2 PUFFS  EVERY 4-6 HOURS AS NEEDED AND AS DIRECTED  Requested for: 73BTH6367; Last  Rx:26Oct2017 Ordered   19  Vision Vitamins Oral Tablet; Take 1 tablet daily; Therapy: 58Htr0618 to Recorded   20  Vitamin D3 1000 UNIT Oral Tablet Chewable; CHEW 1 TABLET DAILY; Therapy: 45CRN4810 to (Last Dominic Lyle)  Requested for: 30Yvi1948; Status: ACTIVE  - Renewal Denied Ordered   21  Warfarin Sodium 3 MG Oral Tablet; Take 2 tablets on Tuesday and 1 tablet all other days; Therapy: 58CBY9312 to (Last Rx:12Oct2017)  Requested for: 87QQT8201 Ordered  Medication List Reviewed: The medication list was reviewed and updated today  Allergies  1  Cephalosporins   2  Doxycycline Hyclate CAPS   3  Doxycycline Monohydrate CAPS   4  Levaquin TABS   5  Toradol SOLN  6  IVP Dye    Vitals  Vital Signs    Recorded: 43LEN4998 02:19PM   Heart Rate 84, L Radial   Pulse Quality Normal, L Radial   Respiration Quality Normal   Respiration 14   Systolic 281, LUE, Sitting   Diastolic 70, LUE, Sitting   Height 5 ft 4 in   Weight 177 lb    BMI Calculated 30 38   BSA Calculated 1 86       Physical Exam   Carotid: bruit heard on the right-- and-- bruit heard on the left  Radial: right 2+-- and-- left 2+  Femoral: right 2+-- and-- left 2+  Popliteal: right 0-- and-- left 0  Posterior tibialis: right 0-- and-- left 0  Dorsalis pedis: right 0-- and-- left 0  Distal Pulse Exam: carotid bruits likely referred from heart murmur delayed B feet  Extremities: No upper or lower extremity edema  LE Varicose Veins: No Varicose Veins are Present  The heart rate was normal  The rhythm was regular  Heart sounds: normal S1-- and-- normal S2   Murmurs: systolic ejection murmur   Pulmonary  Respiratory effort: No increased work of breathing or signs of respiratory distress  Auscultation of lungs: Clear to auscultation  No wheezing, no rales, no rhonchi    Abdomen  Abdomen: Abdomen soft, non-tender, no masses, non distended, no rebound tenderness  No palpable aneurysm  Psychiatric  Orientation to person, place and time: Normal   Mood and affect: Normal   Eyes  Conjunctiva and lids: No swelling, erythema, or discharge  Ears, Nose, Mouth, and Throat  Hearing: Normal   Neck  Neck: No jugular venous distention, normal ROM and extension  No cervical adenopathy, trachea midline, neck supple  Neurologic Sensory exam normal  Motor skills intact  Musculoskeletal  Gait and station: Normal   Skin  Skin and subcutaneous tissue:-- (--2mm x2mm deep wound at medial aspect of L great toe without evidence of infection)      Surgery Scheduling Form  Vascular Surgery Scheduling Form [facility] Standard:    Location: [facility]   Confirmation Number:   Procedure Date:   Requested Time:  Physician Doctor  Co-Surgeon:   [facility] Required:  Bed: Outpatient- No Bed Required  Anesthesia: Conscious Sedation  PROCEDURE DETAILS  Procedure: LLE angiogram with R femoral access  Laterality: Left   Anticipated frozen section:   Procedure Codes:   Pre-op diagnosis:   Diagnosis Code(s):  Case Length: level 2  Equipment:   Equipment Needs:   Implants/Representative:     REGISTRATION & FINANCIAL CLEARANCE     Amount Paid/Date:   FA Initials:   Insurance:   Policy Number: Group Number:     PRE-ADMISSION TESTING & CLINICAL INFORMATION  PAT Location: No PATs Needed     Consults Needed   Anesthesia Consult:   Medical Consult:   Cardiac Consult:        ALLERGIES AND ALERTS  Latex Allergy: NO  Penicillin Allergy: YES   Malignant Hyperthermia:  Diabetic Patient: YES     COMMENTS   Scheduling Information Provided By:     IN OFFICE USE  Urgency: Standard (nonurgent)   Additional Bed Requirements:   CD to Hospital  Is the patient able to walk up a flight of stairs, walk up a hill or do heavy housework WITHOUT having chest pain or shortness of breath? NO     Patient is currently taking Coumadin Coumadin Stop Date:  Hold Coumadin for 5 days  Patient is currently taking Aspirin   does not need to hold Aspirin prior to procedure/surgery           Cardiac Clearance not needed          Future Appointments    Date/Time Provider Specialty Site   12/21/2017 11:00 AM [facility]   11/22/2017 11:15 AM Frankie Jones MD Internal Medicine [facility] [place]   01/12/2018 01:00 PM Frankie Jones MD Internal Medicine [facility] [place]   01/26/2018 02:15 PM Ciera Collins MD Internal Medicine [address] [address]       Signatures   Electronically signed by : Payton Monsivais MD; Nov 16 2017  4:14PM EST                       (Author) [employer]

## 2025-06-27 NOTE — PROGRESS NOTES
Progress Note - Vascular Surgery   Judge Haley [de-identified] y o  female MRN: [id number]  Unit/Bed#: [facility] 527-01 Encounter: [id number]    Assessment:  78F with PAD who p/w L 1st toe wet gangrene (resolved, now dry), L 2nd toe eschar, R 3rd toe dry gangrene, POD #4 s/p L plantar artery exploration    Plan:  - Plan for L BKA Monday 9/17 - patient in agreement but waxing and waning  - Pain control PRN  - Local wound care per Podiatry  - Continue IV Abx   - Cefepime/Flagyl  - Continue to Hold Xarelto   - Heparin VTE protocol in place  - Cardiac CCD 2 diet    Subjective/Objective   Chief Complaint:     Subjective: Patient with no acute events  States she isnt feeling well however has been afebrile  No SOB, no chest pain  When asked about L BKA subject patient states no one has mentioned it to her however she is aware and knowledgeable about the fact that she will be having her leg amputated  Would recommend a sit down  Objective:     Blood pressure 132/71, pulse 64, temperature 98 2 °F (36 8 °C), temperature source Oral, resp  rate 16, height 5' 4" (1 626 m), weight 71 7 kg (158 lb 1 1 oz), SpO2 93 %, not currently breastfeeding  ,Body mass index is 27 13 kg/m²  Intake/Output Summary (Last 24 hours) at 09/16/18 6014  Last data filed at 09/16/18 0300   Gross per 24 hour   Intake           810 55 ml   Output             1500 ml   Net          -689 45 ml       Invasive Devices     Peripheral Intravenous Line            Peripheral IV 09/12/18 Right Forearm 4 days    Peripheral IV 09/15/18 Left Hand less than 1 day          Drain            Urethral Catheter Non-latex;Straight-tip 1 day                Physical Exam: General: AAOx3  Respiratory: BS b/l  Abdomen: Soft, NT, no rebound/guarding    Heart: RRR, S1s2  Ext: LLE - L 1st toe dry gangrene, L 2nd toe eschar, R 3rd toe dry gangrene, no dressings  RLE - see R toe above    Pulses L doppler PT, R doppler DP/PT    Lab, Imaging and other studies:  I have personally reviewed pertinent lab results    , CBC:   Lab Results   Component Value Date    WBC 12 18 (H) 09/15/2018    HGB 9 8 (L) 09/15/2018    HCT 31 7 (L) 09/15/2018    MCV 83 09/15/2018     09/15/2018    MCH 25 8 (L) 09/15/2018    MCHC 30 9 (L) 09/15/2018    RDW 18 8 (H) 09/15/2018    MPV 10 3 09/15/2018    NRBC 0 09/15/2018   , CMP: No results found for: NA, K, CL, CO2, ANIONGAP, BUN, CREATININE, GLUCOSE, CALCIUM, AST, ALT, ALKPHOS, PROT, ALBUMIN, BILITOT, EGFR  VTE Pharmacologic Prophylaxis: Heparin  VTE Mechanical Prophylaxis: sequential compression device done

## 2025-06-30 NOTE — H&P
INTERNAL MEDICINE HISTORY AND PHYSICAL  ED 20 SOD Team C     NAME: Carline Del Castillo  AGE: [de-identified] y o  SEX: female  : 1940   MRN: [id number]  ENCOUNTER: [id number]    DATE: 2018  TIME: 10:52 PM    Primary Care Physician: Lakshmi Awad MD  Admitting Provider: Leonie Colbert MD    Chief complaint: AMS, weakness, vomiting     History of Present Illness     Carline Del Castillo is a [de-identified] y o  female with a history of insulin-dependant diabetes, a hypothyroidism, PAD, asthma paroxsymal afib, CAD s/p CABG (), aortic stenosis s/p TAVR (4/3/18), recent cardioembolic CVA () with transition from Coumadin to [address] [place] [address] presenting from home with several days of AMS, weakness and now with vomiting, loose stools this AM      The patient was previously admitted from 18 to 18 with episodic aphasia  MRI at the time documented bihemispheric multivascular territory punctate infarcts concerning for cardioembolic source  She was on coumadin at the time and was noted to be supratherautic, transitioned to Xarelto for concern coumadin failure  She had been discharged home to her daughter's care  The daughter states that after her previous hospitalization her speech difficulties had been improving and she was doing well physically, but over the last few days she has had notably worsening fatigue  She is normally able to walk with a walker several blocks without issues, but for several days she has been unable to ambulate more than a few feet  Per the daughter she seems both physically fatigued and SOB with minimal exertion  She additionally seems to have some new cognitive deficits, seems notably slower and is responding with one-word answers  Per the daughter her current difficulties with articulation are different from those during her prior admission for stroke  Has additionally been persistently tremulous and complaining of feeling hot   This AM the patient had an epidose of non-bloody vomiting and was brought to the ED  CT AP wo contrast was unremarkable for acute pathology and the patient was sent home  The patient was brought back to the ED again after persistent vomiting  Noted to be confused at the time and was admitted  She has had poor PO intake over last few days, no sick contacts/recent travel  The patient answers in short phrases and when asked if in pain states "I don't feel well," denies abdominal pain but admits to shortness of breath and frontal headache  In the ED she was afebrile, tachcyardic to 112  She was slightly tachypnic but satting well on RA  SBP in 180s  CTH performed without note of intracranial abnormality  CMP notable for corrected calcium 6 8  CBC with hemoglobin 9 2, appears to be at baseline  INR 4 0  Review of Systems   Review of Systems   Constitutional: Positive for diaphoresis and fever  Negative for activity change, appetite change, chills and fatigue  HENT: Negative for congestion and sore throat  Eyes: Negative for visual disturbance  Respiratory: Positive for shortness of breath  Negative for cough, chest tightness and wheezing  Cardiovascular: Negative for chest pain, palpitations and leg swelling  Gastrointestinal: Positive for abdominal pain, diarrhea, nausea and vomiting  Negative for abdominal distention, blood in stool and constipation  Genitourinary: Negative for difficulty urinating and dysuria  Musculoskeletal: Negative for back pain  Skin: Negative for pallor and rash  Neurological: Positive for tremors, weakness and headaches  Negative for dizziness and numbness         Past Medical History     Past Medical History:   Diagnosis Date    Altered mental status     Anticoagulated     Coumadin for Aflutter    Asthma     Atrial flutter (HCC)     maintained on coumadin anticoag    CAD (coronary artery disease)     Candidiasis     Carotid stenosis, bilateral     Cataract     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) ([facility] )     Chronic fatigue syndrome     Chronic low back pain     Concussion w loss of consciousness of unsp duration, init     Coronary artery disease     CVA (cerebral vascular accident) ([facility] ) 4/17/2018    Diabetes mellitus ([place] [address] )     type 2, insulin dependent    DJD (degenerative joint disease)     GERD (gastroesophageal reflux disease)     Herpes zoster     HLD (hyperlipidemia)     HTN (hypertension)     Hyperlipidemia     Hypothyroidism     Irritable bowel syndrome     Lumbar radiculopathy     Lyme disease     Dx in hospital 8/2011    Lyme disease     MI (myocardial infarction) ([facility] )     Migraines     Muscle spasm     Non-neoplastic nevus     Nontoxic multinodular goiter     OA (osteoarthritis)     Osteoarthritis     Other chronic pain     PAD (peripheral artery disease) (HCC)     Palpitations     Pseudogout     Spinal stenosis     Transient cerebral ischemia     Trigger ring finger     Viral gastroenteritis        Past Surgical History     Past Surgical History:   Procedure Laterality Date    APPENDECTOMY      ARTERIOGRAM  12/19/2017    CARDIAC CATHETERIZATION      CHOLECYSTECTOMY      COLONOSCOPY      CORONARY ARTERY BYPASS GRAFT  2004    LUMBAR LAMINECTOMY      OH ECHO TRANSESOPHAG R-T 2D W/PRB IMG ACQUISJ I&R N/A 4/3/2018    Procedure: TRANSESOPHAGEAL ECHOCARDIOGRAM (ROBB);   Surgeon: Vijaya Talley DO;  [facility]: [facility];  Service: Cardiac Surgery    OH REPLACE AORTIC VALVE OPENFEMORAL ARTERY APPROACH N/A 4/3/2018    Procedure: REPLACEMENT AORTIC VALVE TRANSCATHETER (TAVR) TRANSFEMORAL w/ 26MM IVY YEVGENIY S3 VALVE;  Surgeon: Vijaya Talley DO;  [facility];  Service: Cardiac Surgery    THYROIDECTOMY      TOTAL ABDOMINAL HYSTERECTOMY W/ BILATERAL SALPINGOOPHORECTOMY         Social History     History   Alcohol Use No     History   Drug Use No     History   Smoking Status    Never Smoker   Smokeless Tobacco    Never Used       Family History Family History   Problem Relation Age of Onset    Cancer Mother     Stroke Mother     Cancer Father     Heart disease Father     Breast cancer Sister     Diabetes Daughter      Passed away January 2017        Medications Prior to Admission     Prior to Admission medications    Medication Sig Start Date End Date Taking?  Authorizing Provider   aspirin 81 MG tablet Take 81 mg by mouth daily   Yes Historical Provider, MD   atorvastatin (LIPITOR) 80 mg tablet Take 1 tablet (80 mg total) by mouth daily 4/26/18  Yes Cherie Hernandez MD   calcium carbonate (TUMS) 500 mg chewable tablet Chew 2 tablets 3 (three) times a day     Yes Historical Provider, MD   cholecalciferol (VITAMIN D3) 1,000 units tablet Take 1,000 Units by mouth daily   10/20/16  Yes Historical Provider, MD   diphenhydrAMINE (BANOPHEN) 50 mg capsule Take 50 mg by mouth daily   Yes Historical Provider, MD   docusate sodium (COLACE) 100 mg capsule Take 1 capsule (100 mg total) by mouth 2 (two) times a day 4/6/18  Yes Amando Hernandez PA-C   esomeprazole (NexIUM) 20 mg capsule Take 20 mg by mouth daily in the early morning     Yes Historical Provider, MD   fluticasone (FLONASE) 50 mcg/act nasal spray 1 spray into each nostril daily   Yes Historical Provider, MD   furosemide (LASIX) 40 mg tablet Take 40 mg by mouth 2 (two) times a day   Yes Historical Provider, MD   glucose blood test strip 1 each by Other route 4 (four) times a day (before meals and at bedtime) Use as instructed   Yes Historical Provider, MD   LANTUS 100 UNIT/ML subcutaneous injection Inject 20 Units under the skin 2 (two) times a day  Patient taking differently: Inject 30 Units under the skin 2 (two) times a day   4/19/18  Yes [facility]   levothyroxine 100 mcg tablet Take 100 mcg by mouth daily   Yes Historical Provider, MD   lisinopril (ZESTRIL) 5 mg tablet Take 2 5 mg by mouth daily at bedtime     Yes Historical Provider, MD   loratadine (CLARITIN) 10 mg tablet Take 1 tablet (10 mg total) by mouth daily 3/28/18  Yes Goldy Theodore PA-C   metFORMIN (GLUCOPHAGE) 1000 MG tablet 1,000 mg 2 (two) times a day   Yes Historical Provider, MD   metoprolol tartrate (LOPRESSOR) 100 mg tablet Take 1 tablet (100 mg total) by mouth every 12 (twelve) hours 4/6/18  Yes Amando Hernandez PA-C   Mometasone Furoate [facility] HFA) 100 MCG/ACT AERO Inhale 2 puffs once daily 3/12/18  Yes Izzy Tai MD   nitroglycerin (NITROSTAT) 0 4 mg SL tablet Place 0 4 mg under the tongue every 3 (three) minutes as needed 5/11/15  Yes Historical Provider, MD   pantoprazole (PROTONIX) 40 mg tablet Take 40 mg by mouth daily   Yes Historical Provider, MD   polyethylene glycol (MIRALAX) 17 g packet Take 17 g by mouth daily   Yes Historical Provider, MD   potassium chloride (K-DUR,KLOR-CON) 10 mEq tablet Take 10 mEq by mouth 2 (two) times a day   Yes Historical Provider, MD   rivaroxaban (XARELTO) 20 mg tablet Take 1 tablet (20 mg total) by mouth daily 4/19/18  Yes Fabricio Ma   acetaminophen (TYLENOL) 650 mg CR tablet Take 1 tablet (650 mg total) by mouth every 8 (eight) hours as needed for mild pain Do not take in conjunction with Percocet   4/6/18   Amando Hernandez PA-C   albuterol (VENTOLIN HFA) 90 mcg/act inhaler Inhale 108 mcg 2 (two) times a day as needed 2 puffs bid PRN    Historical Provider, MD   dextromethorphan-guaiFENesin (ROBITUSSIN DM)  mg/5 mL syrup Take 10 mL by mouth every 4 (four) hours as needed for cough    Historical Provider, MD       Allergies     Allergies   Allergen Reactions    Other Chest Pain     IVP-listed as "chest pain" in previous chart, patient stated this occurred "a long time ago" but does not remember exactly occurred     Cephalosporins Chest Pain    Contrast [Iodinated Diagnostic Agents] Other (See Comments)     Flash pulm edema    Doxycycline Chest Pain    Levaquin [Levofloxacin] Chest Pain    Ondansetron Chest Pain     Prolonged QT    Toradol [Ketorolac Tromethamine] Chest Pain       Objective     Vitals:    04/28/18 2110 04/28/18 2159 04/28/18 2215 04/28/18 2245   BP:  (!) 171/83 166/81 (!) 183/84   BP Location:   Right arm Right arm   Pulse: 88 89 92 (!) 108   Resp: (!) 35 22 22 (!) 26   Temp:       TempSrc:       SpO2: 96% 95% 95% 96%     There is no height or weight on file to calculate BMI  No intake or output data in the 24 hours ending 04/28/18 2252  Invasive Devices     Peripheral Intravenous Line            Peripheral IV 04/28/18 Right Antecubital less than 1 day                Physical Exam  GENERAL: Diffusely tremulous    HEENT: Normocephalic and atraumatic  No scleral icterus  PERRLA  EOMI B/L  No oropharyngeal edema  MM moist    NECK: Neck supple with no lymphadenopathy  Trachea midline  No JVD  CARDIOVASCULAR: S1 and S2 are present  Regular rate and rhythm  No murmurs, rubs, or gallops  RESPIRATORY: Bilateral basilar crackles    ABDOMINAL: Bowel sounds present in all 4 quadrants, non-tender, soft, non-distended  No organomegaly, rebound, or guarding  EXTREMITIES: 2+ DP and PT pulses bilaterally; no cyanosis, clubbing, edema  ROM intact  DOWNEY x4   MUSCULOSKELETAL: No joint tenderness, deformity or swelling, full range of motion without pain  NEUROLOGIC: AAOx3 but slow to respond No sensory or motor deficits  CN 2-12 intact  Plantars downgoing    SKIN: Skin is warm and dry  No skin lesions are present  No rashes  PSYCHIATRIC: Normal mood and affect     Lab Results: I have personally reviewed pertinent reports  Imaging: I have personally reviewed pertinent films in PACS  Ct Abdomen Pelvis Wo Contrast    Result Date: 4/28/2018  Narrative: CT ABDOMEN AND PELVIS WITHOUT IV CONTRAST INDICATION:   abdominal pain, nausea  Nausea and weakness for 2 days   COMPARISON: CT chest abdomen pelvis performed March 15, 2018 TECHNIQUE:  CT examination of the abdomen and pelvis was performed without intravenous contrast   Axial, sagittal, and coronal 2D reformatted images were created from the source data and submitted for interpretation  Radiation dose length product (DLP) for this visit:  710 22 mGy-cm   This examination, like all CT scans performed in the [facility], was performed utilizing techniques to minimize radiation dose exposure, including the use of iterative  reconstruction and automated exposure control  Enteric contrast was not administered  FINDINGS: ABDOMEN LOWER CHEST:  Emphysematous changes are present in the lower lobes of the lungs bilaterally  The heart is enlarged  Coronary artery calcifications  Prior aortic valve replacement  LIVER/BILIARY TREE:  Unremarkable  GALLBLADDER:  Surgically absent  SPLEEN:  Unremarkable  PANCREAS:  Unremarkable  ADRENAL GLANDS:  Unremarkable  KIDNEYS/URETERS:  One or more sharply circumscribed subcentimeter renal hypodensities are noted  These lesions are too small to accurately characterize, but are statistically most likely to represent benign cortical renal cyst(s)  According to the guidelines published in the [facility] Paper of the ACR Incidental Findings Committee (Radiology 2010), no further workup of these lesions is recommended  Renal calculi bilaterally, stable from the prior study  No hydronephrosis or hydroureter  No ureteric calculi  Extensive vascular calcifications in the renal vessels  STOMACH AND BOWEL:  Evaluation limited due to lack of oral contrast material  Stomach decompressed  Hiatal hernia  Small bowel unremarkable  Moderate amount of liquid feces throughout the colon  Correlate for diarrhea  APPENDIX:  Surgically absent  ABDOMINOPELVIC CAVITY:  No ascites or free intraperitoneal air  No lymphadenopathy  VESSELS:  Extensive vascular calcifications  PELVIS REPRODUCTIVE ORGANS:  Surgically absent  URINARY BLADDER:  Unremarkable  ABDOMINAL WALL/INGUINAL REGIONS:  Unremarkable  OSSEOUS STRUCTURES:  No acute fracture or destructive osseous lesion  Degenerative changes  Impression: No acute abdominal pelvic pathology  No significant change from prior  Workstation performed: [id number]     Xr Chest Portable    Result Date: 4/4/2018  Narrative: CHEST INDICATION:   Post Open Heart Surgey  COMPARISON:  4/3/2018 EXAM PERFORMED/VIEWS:  XR CHEST PORTABLE FINDINGS:  Stable right IJ catheter, tip in the caval atrial junction region  Heart shadow is enlarged but unchanged from prior exam  Decreased small left effusion  No new consolidation  No pneumothorax  Osseous structures appear within normal limits for patient age  Impression: Decreased small left effusion  No new consolidation  Workstation performed: [id number]     Xr Chest 2 Views    Result Date: 4/28/2018  Narrative: CHEST INDICATION:   Weakness, shaking and nausea for 2 days  COMPARISON:  4/16/2018, report CTA chest, abdomen and pelvis 3/15/2018 EXAM PERFORMED/VIEWS:  XR CHEST PA & LATERAL FINDINGS: Heart shadow is enlarged but unchanged from prior exam   Status post median sternotomy with aortic valve replacement  The lungs are clear  No pneumothorax or pleural effusion  Degenerative changes of the spine  Impression: No acute cardiopulmonary disease  Workstation performed: [id number]     Xr Chest 2 Views    Result Date: 4/16/2018  Narrative: CHEST INDICATION:   cough  COMPARISON:  4/4/2018 EXAM PERFORMED/VIEWS:  XR CHEST PA & LATERAL Images: 2 FINDINGS: Heart shadow is enlarged but unchanged from prior exam  The lungs are clear  No pneumothorax or pleural effusion  Osseous structures appear within normal limits for patient age  Impression: No acute cardiopulmonary disease  Workstation performed: [id number]     Ct Head Without Contrast    Result Date: 4/28/2018  Narrative: CT BRAIN - WITHOUT CONTRAST INDICATION:   AMS ans vomit  COMPARISON:  April 16, 2018 TECHNIQUE:  CT examination of the brain was performed    In addition to axial images, coronal 2D reformatted images were created and submitted for interpretation  Radiation dose length product (DLP) for this visit:  994 mGy-cm   This examination, like all CT scans performed in the [facility], was performed utilizing techniques to minimize radiation dose exposure, including the use of iterative reconstruction and automated exposure control  IMAGE QUALITY:  Diagnostic  FINDINGS: PARENCHYMA: Decreased attenuation is noted in periventricular and subcortical white matter demonstrating an appearance that is statistically most likely to represent moderate microangiopathic change; this appearance is similar when compared to most recent prior examination  No CT signs of acute infarction  No intracranial mass, mass effect or midline shift  No acute parenchymal hemorrhage  VENTRICLES AND EXTRA-AXIAL SPACES:  Enlargement of ventricles and extra-axial CSF spaces, out of proportion to the patient's age most consistent with cerebral and cerebellar atrophy  VISUALIZED ORBITS AND PARANASAL SINUSES:  Partial opacification of the sphenoid sinuses is seen  CALVARIUM AND EXTRACRANIAL SOFT TISSUES:  Normal      Impression: No acute intracranial abnormality  Workstation performed: [id number]     Ct Head Without Contrast    Result Date: 4/16/2018  Narrative: CT BRAIN - WITHOUT CONTRAST INDICATION:   Family noticed patient having difficulty with speech  COMPARISON:  CT head 6/27/2017 TECHNIQUE:  CT examination of the brain was performed  In addition to axial images, coronal 2D reformatted images were created and submitted for interpretation  Radiation dose length product (DLP) for this visit:  950 4 mGy-cm   This examination, like all CT scans performed in the [facility], was performed utilizing techniques to minimize radiation dose exposure, including the use of iterative reconstruction and automated exposure control  IMAGE QUALITY:  Diagnostic  FINDINGS: PARENCHYMA:  No intracranial mass, mass effect or midline shift   No CT signs of acute infarction  No acute intracranial hemorrhage  Age-related cortical atrophy  Advanced periventricular and subcortical white matter hypodensity which is nonspecific although most compatible with chronic small vessel ischemic disease  Vascular and bilateral basal ganglia calcifications  VENTRICLES AND EXTRA-AXIAL SPACES:  Normal for the patient's age  VISUALIZED ORBITS AND PARANASAL SINUSES:  No acute abnormality involving the orbits  Mild scattered sinus mucosal thickening is noted  No fluid levels are seen  CALVARIUM AND EXTRACRANIAL SOFT TISSUES:  Normal      Impression: No acute intracranial abnormality  Age related atrophy and findings most compatible with chronic small vessel ischemic disease  Workstation performed: [id number]     Mri Brain Wo Contrast    Result Date: 4/17/2018  Narrative: MRI BRAIN WITHOUT CONTRAST INDICATION:  expressive aphasia  patient states she knew what she wanted to say but couldn't get the words out-episode occurred yesterday COMPARISON:   4/16/2018 TECHNIQUE:  Sagittal T1, axial T2, axial FLAIR, axial T1, axial [place] and axial diffusion imaging  IMAGE QUALITY:  Diagnostic  FINDINGS: BRAIN PARENCHYMA:  There are at least 4 discrete punctate foci of diffusion restriction including one in the left cerebellum on image 4, series 3  There is a small to moderate-sized cortical infarction in the right occipital lobe on image 13, series 3 in the right posterior cerebral artery territory  More superiorly in the right parietal lobe posteriorly on image 22 series 3 there is a 3rd focus of cortical diffusion restriction in the right MCA territory  A tiny subtle punctate cortical focus in the left frontal lobe anteriorly and inferiorly on image 12 series 3 is noted in the left MCA territory implicating multiple vascular territories with no areas of acute infarction  Elsewhere there is confluent periventricular FLAIR hyperintensity    Striated brainstem and cerebellar hyperintensity noted as well  No acute intracranial hemorrhage  No extra-axial fluid collection  Cerebellar tonsils are normally positioned  VENTRICLES:  Age-appropriate prominence noted  SELLA AND PITUITARY GLAND:  Normal  ORBITS:  Bilateral lens implants noted  PARANASAL SINUSES:  Moderate right sphenoid sinus signal abnormality  Mild bilateral ethmoidal sinus disease  VASCULATURE:  Evaluation of the major intracranial vasculature demonstrates appropriate flow voids  CALVARIUM AND SKULL BASE:  Normal  EXTRACRANIAL SOFT TISSUES:  Normal      Impression: 1   4 punctate foci of diffusion restriction indicative of scattered tiny acute/recent lacunar infarctions in 4 distinct vascular territories implicating a central embolic source  Does the patient have atrial fibrillation? 2  Advanced, chronic microangiopathy  Workstation performed: [id number]     Vas Carotid Complete Study    Result Date: 4/18/2018  Narrative:  [facility] REPORT CLINICAL: Indications:  Patient presents with to evaluate for carotid artery stenosis s/p recent CVA  The patient denies symptoms  Operative History CABG lower extremity a-gram 12/2017  FINDINGS:  Segment      Rig                     Left                      PSV  EDV (cm/s)  Ratio  PSV  EDV (cm/s)  Ratio  Dist  ICA     50          17   0 71   60          20   1 22  Mid  ICA      76          23   1 09   55          18   1 11  Prox  ICA     54          14   0 77   58          16   1 18  Dist CCA      56          11          48          11         Mid CCA       70          14   1 41   50          12   0 78  Prox CCA      50           9          63          12         Ext Carotid  226          28   3 24   55           0   1 11  Prox Vert     73          16          93          19         Subclavian    98           0         105           6            CONCLUSION:  Impression RIGHT: There is <50% stenosis noted in the internal carotid artery  Plaque is heterogenous and irregular   Vertebral artery flow is antegrade  There is no significant subclavian artery disease  LEFT: There is <50% stenosis noted in the internal carotid artery  Plaque is heterogenous and irregular  Vertebral artery flow is antegrade  There is no significant subclavian artery disease  Compared to previous study on 03/18/2018, there is no change  Internal carotid artery stenosis determination by consensus criteria from: dallas Richardson al  Carotid Artery Stenosis: Gray-Scale and Doppler US Diagnosis - Society of Radiologists in [facility] [address], Radiology 2003; [phone]  SIGNATURE: Electronically Signed by: Constantino Bronson on 2018-04-18 06:57:52 AM    Xr Chest Portable Icu    Result Date: 4/3/2018  Narrative: CHEST portable INDICATION: S/P TAVR COMPARISON: March 16, 2018 VIEWS:  AP semierect; IMAGES:  1 FINDINGS: Right internal jugular catheter tip at the atrial caval junction  Endotracheal tube tip 4 8 cm above the dwayne  Midline sternotomy wires are present from previous cardiac surgery  The cardiomediastinal silhouette is unchanged  Aortic valve replacement prosthesis, new in the interval  Blunting left costophrenic angle may represent small pleural effusion  Lungs are otherwise well aerated without opacities  Bony thorax is otherwise unremarkable  Electrode (EKG) monitoring devices overlie the chest      Impression: Lines and tubes as described above  Status post aortic valve replacement  No pneumothorax  Small left pleural effusion questioned     Workstation performed: [id number]       Microbiology: cultures obtained in emergency department include    Urinalysis:   Results from last 7 days  Lab Units 04/28/18  1039   COLOR UA  Yellow   CLARITY UA  Clear   SPEC GRAV UA  1 025   PH UA  5 5   LEUKOCYTES UA  Negative   NITRITE UA  Negative   PROTEIN UA mg/dl 30 (1+)*   GLUCOSE UA mg/dl Negative   KETONES UA mg/dl Negative   BILIRUBIN UA  Negative   BLOOD UA  Trace*        Urine Micro:   Results from last 7 No days  Lab Units 04/28/18  1039   RBC UA /hpf None Seen   WBC UA /hpf None Seen   EPITHELIAL CELLS WET PREP /hpf None Seen   BACTERIA UA /hpf None Seen        EKG, Pathology, and Other Studies: I have personally reviewed pertinent reports  Medications given in Emergency Department     Medication Administration - last 24 hours from 04/27/2018 2252 to 04/28/2018 2252       Date/Time Order Dose Route Action Action by     04/28/2018 2127 metoclopramide (REGLAN) injection 10 mg 10 mg Intravenous Given Maryam Early RN     04/28/2018 2206 sodium chloride 0 9 % bolus 1,000 mL 1,000 mL Intravenous New Bag Maryam Early RN          Assessment and Plan     [de-identified] y o  female with a history of insulin-dependant diabetes, a hypothyroidism, PAD, asthma paroxsymal afib, CAD s/p CABG (2005), aortic stenosis s/p TAVR (4/3/18), 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) with transition from Coumadin to Xarelto presenting from home with several days of AMS, weakness now with vomiting and loose stools this AM      AMS: Per daughter prior to stroke she was completely independent in ADLs prior to stoke, discharged home to [facility] care after stroke with some improvement in speech with now with new slowness/word-finding difficulties  CTH neg, UA clean  Consideration generalized viral illness v  hypocalcemia v  recurrent CVA, further puctate emboli contributing to AMS  · Currently AAOx3 though slow to respond  No gross focal neurological deficits   · Fall/delerium precautions   · Consideration for repeat MRI, will defer to day team   · Further workup as below   · Consideration for PT/OT eval     Nausea/vomiting/diarrhea: Consideration viral gastroenteritis, flu  No evidence of bacterial infection at this time  Consideration component chronic mesenteric ischemia  Abdominal exam benign     · PRN Zofran  · Trial clear liquid diet, ADAT  · Follow-up flu panel     Acute hypoxic respiratory insufficiency: On initial eval in ED satting well on RA, no obvious pulmonary edema on ED CXR  Bolused 1L NS in ED  · Desatting to mid-80s on RA, requiring 5L NC  Bilateral basilar crackles on exam, tachypnea  · Titrate O2 >88%  · Suspect iatrogenic overload lasix 20 IV x1   · Follow-up repeat XR      Hx CHF s/p aortic stenosis s/p TAVR: TAVR 4/3/18  · Not discharged on lasix prior admission but 40 BID on home med rec  · Will hold scheduled lasix for now defer to day team     Hypocalcemia: Corrected 6 8  Appears to be significantly decreased from prior  Consideration that contributing to AMS  · IV calcium gluconate   · Follow-up PTH, AM calcium     Elevated INR: INR 4 on admission, no evidence of bleeding/liver failure  Consideration that Xarelto associated with the increased INR  · Continue to monitor  Generalized weakness: Workup as above  · Consideration for PT/OT defer to day team     HTN:   · SBP trending 180s on admission  · Continue home metoprolol 100 BID, lisinopril  2 5 QD  · Hydralazine PRN     Hypothyroidism: Normal TSH on admission   · Continue home levothyroxine     Diabetes Mellitus: HbA1c 7 7 4/18  At home on Lantus 30u subQ BID  · Per family no reports of low sugars  · Continue Lantus 30u BID, ISS/POCT       CAD s/p CAGB  · Continue home aspirin, statin, metoprolol, lisinopril       Asthma:   · Continue home home Asmanex    PAF:  · Continue metoprolol 100 BID     PAD:   · Consideration wound care consult     Code Status: Level 1 - Full Code  VTE Pharmacologic Prophylaxis: Reason for no pharmacologic prophylaxis Anti-coagulated   VTE Mechanical Prophylaxis: sequential compression device  Admission Status: OBSERVATION    Admission Time  I spent 30 minutes admitting the patient  This involved direct patient contact where I performed a full history and physical, reviewing previous records, and reviewing laboratory and other diagnostic studies      Guzman Lanier MD  Internal Medicine  PGY-1

## (undated) DEVICE — GLOVE SRG BIOGEL ECLIPSE 7

## (undated) DEVICE — CARDIOVASCULAR SPLIT DRAPE: Brand: CONVERTORS

## (undated) DEVICE — CARDIO PERI-GROIN: Brand: CONVERTORS

## (undated) DEVICE — CHLORAPREP HI-LITE 26ML ORANGE

## (undated) DEVICE — SUT VICRYL 0 CT-1 27 IN J260H

## (undated) DEVICE — DEFIB ADULT ELECTRODE CARDINAL

## (undated) DEVICE — TRAY FOLEY 16FR SURESTEP TEMP SENS URIMETER STAT LOK

## (undated) DEVICE — DRESSING MEPILEX AG BORDER 4 X 8 IN

## (undated) DEVICE — DRAPE C-ARM X-RAY

## (undated) DEVICE — SUT PROLENE 6-0 BV130 30 IN 8709H

## (undated) DEVICE — LIGACLIP MCA MULTIPLE CLIP APPLIERS, 20 SMALL CLIPS: Brand: LIGACLIP

## (undated) DEVICE — TIBURON SPLIT SHEET: Brand: CONVERTORS

## (undated) DEVICE — ULTRACLEAN ACCESSORY ELECTRODE, 1 INCH COATED NEEDLE WITH EXTENDED INSULATION: Brand: ULTRACLEAN

## (undated) DEVICE — STERILE MAJOR GENERAL PACK: Brand: CARDINAL HEALTH

## (undated) DEVICE — 1200CC GUARDIAN II: Brand: GUARDIAN

## (undated) DEVICE — SUT MONOCRYL 3-0 SH 27 IN Y416H

## (undated) DEVICE — PLUMEPEN PRO 10FT

## (undated) DEVICE — SUT SILK 4-0 18 IN A183H

## (undated) DEVICE — PAD GROUNDING ADULT

## (undated) DEVICE — INTENDED FOR TISSUE SEPARATION, AND OTHER PROCEDURES THAT REQUIRE A SHARP SURGICAL BLADE TO PUNCTURE OR CUT.: Brand: BARD-PARKER SAFETY BLADES SIZE 15, STERILE

## (undated) DEVICE — STERILE FEM POP PACK: Brand: CARDINAL HEALTH

## (undated) DEVICE — IV CATH INTROCAN 18G X 1 1/4 SAFETY

## (undated) DEVICE — SCD SEQUENTIAL COMPRESSION COMFORT SLEEVE MEDIUM KNEE LENGTH: Brand: KENDALL SCD

## (undated) DEVICE — SUT SILK 2-0 SH 30 IN K833H

## (undated) DEVICE — ABDOMINAL PAD: Brand: DERMACEA

## (undated) DEVICE — SUT VICRYL 0 REEL 54 IN J287G

## (undated) DEVICE — DRAPE SHEET THREE QUARTER

## (undated) DEVICE — THE SIMPULSE SOLO SYSTEM WITH ULTREX RETRACTABLE SPLASH SHIELD TIP: Brand: SIMPULSE SOLO

## (undated) DEVICE — INTENDED FOR TISSUE SEPARATION, AND OTHER PROCEDURES THAT REQUIRE A SHARP SURGICAL BLADE TO PUNCTURE OR CUT.: Brand: BARD-PARKER ® CARBON RIB-BACK BLADES

## (undated) DEVICE — SPONGE STICK WITH PVP-I: Brand: KENDALL

## (undated) DEVICE — PROXIMATE PLUS MD MULTI-DIRECTIONAL RELEASE SKIN STAPLERS CONTAINS 35 STAINLESS STEEL STAPLES APPROXIMATE CLOSED DIMENSIONS: 6.9MM X 3.9MM WIDE: Brand: PROXIMATE

## (undated) DEVICE — BANDAGE, ESMARK LF STR 6"X9' (20/CS): Brand: CYPRESS

## (undated) DEVICE — SUT PROLENE 7-0 BV175-6 24 IN M8737

## (undated) DEVICE — CUFF TOURNIQUET 24 X 4 IN QUICK CONNECT DISP 1BLA

## (undated) DEVICE — KERLIX BANDAGE ROLL: Brand: KERLIX

## (undated) DEVICE — BULB SYRINGE,IRRIGATION WITH PROTECTIVE CAP: Brand: DOVER

## (undated) DEVICE — NEEDLE 25G X 1 1/2

## (undated) DEVICE — SYRINGE 10ML LL

## (undated) DEVICE — GAUZE SPONGES,USP TYPE VII GAUZE, 12 PLY: Brand: CURITY

## (undated) DEVICE — PENCIL ELECTROSURG E-Z CLEAN -0035H

## (undated) DEVICE — STOCKINETTE REGULAR

## (undated) DEVICE — MEDI-VAC YANKAUER SUCTION HANDLE W/STRAIGHT TIP & CONTROL VENT: Brand: CARDINAL HEALTH

## (undated) DEVICE — OCCLUSIVE GAUZE STRIP,3% BISMUTH TRIBROMOPHENATE IN PETROLATUM BLEND: Brand: XEROFORM

## (undated) DEVICE — 3000CC GUARDIAN II: Brand: GUARDIAN

## (undated) DEVICE — SUT SILK 0 SH 30 IN K834H

## (undated) DEVICE — SUT SILK 0 CT-1 30 IN 424H

## (undated) DEVICE — SUT PROLENE 5-0 C-1/C-1 36 IN 8720H

## (undated) DEVICE — 2108 SERIES SAGITTAL BLADE (18.6 X 0.64 X 61.1MM)

## (undated) DEVICE — BAG DECANTER

## (undated) DEVICE — 1/2 FORCE SURGICAL SPRING CLIP: Brand: STEALTH® SPRING CLIP

## (undated) DEVICE — DRESSING XEROFORM 5 X 9

## (undated) DEVICE — TRAY FOLEY 16FR URIMETER SURESTEP

## (undated) DEVICE — X-RAY DETECTABLE SPONGES,16 PLY: Brand: VISTEC

## (undated) DEVICE — SUT ETHILON 4-0 PS-2 18 IN 1667G

## (undated) DEVICE — INTENDED FOR TISSUE SEPARATION, AND OTHER PROCEDURES THAT REQUIRE A SHARP SURGICAL BLADE TO PUNCTURE OR CUT.: Brand: BARD-PARKER SAFETY BLADES SIZE 10, STERILE

## (undated) DEVICE — ACE WRAP 6 IN UNSTERILE

## (undated) DEVICE — LIGACLIP MCA MULTIPLE CLIP APPLIERS, 20 MEDIUM CLIPS: Brand: LIGACLIP

## (undated) DEVICE — THERMOFLECT BLANKET, L, 25EA                               TS THERMOFLECT BLANKET, 48" X 84", SILVER, 5/BG, 5 BG/CS NW: Brand: THERMOFLECT

## (undated) DEVICE — HEAVY DUTY TABLE COVER: Brand: CONVERTORS

## (undated) DEVICE — BETADINE SURGICAL SCRUB 32OZ

## (undated) DEVICE — GAUZE SPONGES,16 PLY: Brand: CURITY

## (undated) DEVICE — GLOVE INDICATOR PI UNDERGLOVE SZ 7 BLUE

## (undated) DEVICE — SUT SILK 2-0 SH CR/8 18 IN C012D

## (undated) DEVICE — 3M™ V.A.C.® GRANUFOAM™ DRESSING KIT, M8275052, MEDIUM: Brand: 3M™ V.A.C.® GRANUFOAM™

## (undated) DEVICE — GLOVE SRG BIOGEL 7.5

## (undated) DEVICE — ADHESIVE SKN CLSR HISTOACRYL FLEX 0.5ML LF

## (undated) DEVICE — SUT SILK 0 30 IN A306H

## (undated) DEVICE — SUT SILK 2-0 18 IN A185H

## (undated) DEVICE — UNIVERSAL MAJOR EXTREMITY,KIT: Brand: CARDINAL HEALTH

## (undated) DEVICE — BETHLEHEM UNIVERSAL MINOR GEN: Brand: CARDINAL HEALTH

## (undated) DEVICE — 3M™ TEGADERM™ TRANSPARENT FILM DRESSING FRAME STYLE, 1626W, 4 IN X 4-3/4 IN (10 CM X 12 CM), 50/CT 4CT/CASE: Brand: 3M™ TEGADERM™

## (undated) DEVICE — SUT ETHILON 3-0 FSLX 30 IN 1673H

## (undated) DEVICE — SPONGE LAP 18 X 18 IN

## (undated) DEVICE — Device

## (undated) DEVICE — VAC CANISTER 500ML

## (undated) DEVICE — SUT MONOCRYL 2-0 CT-1 27 IN Y339H

## (undated) DEVICE — DRAPE SURGIKIT SADDLE BAG